# Patient Record
Sex: FEMALE | Race: WHITE | Employment: OTHER | URBAN - METROPOLITAN AREA
[De-identification: names, ages, dates, MRNs, and addresses within clinical notes are randomized per-mention and may not be internally consistent; named-entity substitution may affect disease eponyms.]

---

## 2017-10-06 PROBLEM — E87.5 HYPERKALEMIA: Status: ACTIVE | Noted: 2017-10-06
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
## 2017-10-06 PROBLEM — N17.9 ACUTE KIDNEY INJURY (HCC): Status: ACTIVE | Noted: 2017-10-06

## 2017-10-06 PROBLEM — D64.9 ANEMIA: Status: ACTIVE | Noted: 2017-10-06

## 2017-10-07 PROBLEM — E87.29 RESPIRATORY ACIDOSIS: Status: ACTIVE | Noted: 2017-10-07

## 2018-01-15 PROBLEM — I50.9 CONGESTIVE HEART FAILURE (HCC): Status: ACTIVE | Noted: 2018-01-15

## 2018-01-15 PROBLEM — N17.9 ACUTE KIDNEY INJURY (HCC): Status: RESOLVED | Noted: 2017-10-06 | Resolved: 2018-01-15

## 2018-01-15 PROBLEM — I50.33 ACUTE ON CHRONIC DIASTOLIC CONGESTIVE HEART FAILURE (HCC): Status: ACTIVE | Noted: 2018-01-15

## 2018-01-16 LAB
LEFT VENTRICULAR EJECTION FRACTION HIGH VALUE: 65 %
LEFT VENTRICULAR EJECTION FRACTION MODE: NORMAL
LV EF: 55 %

## 2018-01-19 PROBLEM — R53.81 PHYSICAL DECONDITIONING: Status: ACTIVE | Noted: 2018-01-19
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
## 2018-01-23 PROBLEM — E87.29 RESPIRATORY ACIDOSIS: Status: RESOLVED | Noted: 2017-10-07 | Resolved: 2018-01-23

## 2018-03-16 ENCOUNTER — OFFICE VISIT (OUTPATIENT)
Dept: CARDIOLOGY CLINIC | Age: 63
End: 2018-03-16
Payer: COMMERCIAL

## 2018-03-16 VITALS
RESPIRATION RATE: 18 BRPM | DIASTOLIC BLOOD PRESSURE: 58 MMHG | HEIGHT: 63 IN | HEART RATE: 83 BPM | BODY MASS INDEX: 51.21 KG/M2 | WEIGHT: 289 LBS | SYSTOLIC BLOOD PRESSURE: 110 MMHG

## 2018-03-16 DIAGNOSIS — I50.30 (HFPEF) HEART FAILURE WITH PRESERVED EJECTION FRACTION (HCC): ICD-10-CM

## 2018-03-16 DIAGNOSIS — G47.30 SLEEP-DISORDERED BREATHING: ICD-10-CM

## 2018-03-16 DIAGNOSIS — I10 ESSENTIAL HYPERTENSION: Primary | ICD-10-CM

## 2018-03-16 DIAGNOSIS — R60.0 LEG EDEMA: ICD-10-CM

## 2018-03-16 DIAGNOSIS — I48.0 PAROXYSMAL ATRIAL FIBRILLATION (HCC): ICD-10-CM

## 2018-03-16 DIAGNOSIS — I51.89 DIASTOLIC DYSFUNCTION: ICD-10-CM

## 2018-03-16 PROCEDURE — 3017F COLORECTAL CA SCREEN DOC REV: CPT | Performed by: INTERNAL MEDICINE

## 2018-03-16 PROCEDURE — 99214 OFFICE O/P EST MOD 30 MIN: CPT | Performed by: INTERNAL MEDICINE

## 2018-03-16 PROCEDURE — 3014F SCREEN MAMMO DOC REV: CPT | Performed by: INTERNAL MEDICINE

## 2018-03-16 PROCEDURE — G8482 FLU IMMUNIZE ORDER/ADMIN: HCPCS | Performed by: INTERNAL MEDICINE

## 2018-03-16 PROCEDURE — 93000 ELECTROCARDIOGRAM COMPLETE: CPT | Performed by: INTERNAL MEDICINE

## 2018-03-16 PROCEDURE — 1036F TOBACCO NON-USER: CPT | Performed by: INTERNAL MEDICINE

## 2018-03-16 PROCEDURE — G8598 ASA/ANTIPLAT THER USED: HCPCS | Performed by: INTERNAL MEDICINE

## 2018-03-16 PROCEDURE — G8427 DOCREV CUR MEDS BY ELIG CLIN: HCPCS | Performed by: INTERNAL MEDICINE

## 2018-03-16 PROCEDURE — G8417 CALC BMI ABV UP PARAM F/U: HCPCS | Performed by: INTERNAL MEDICINE

## 2018-03-16 RX ORDER — FUROSEMIDE 20 MG/1
20 TABLET ORAL DAILY
Qty: 90 TABLET | Refills: 3 | Status: ON HOLD | OUTPATIENT
Start: 2018-03-16 | End: 2018-04-16

## 2018-03-17 NOTE — PROGRESS NOTES
SUPENTA  1955  Date of Service: 3/17/2018    Patient Active Problem List    Diagnosis Date Noted    Physical deconditioning 01/19/2018    Acute on chronic diastolic congestive heart failure ([facility] 01/15/2018    Hyperkalemia 10/06/2017    PFO (patent foramen ovale) 08/12/2015    Positive hepatitis C antibody test 01/21/2015    Chronic anal fissure 06/14/2013    LVH (left ventricular hypertrophy)      Overview Note:     moderate      Iron deficiency 03/15/2013    Diabetic neuropathy ([facility] 02/22/2013    Baker's cyst of knee 02/13/2013    Steatohepatitis 11/13/2012    Chronic pain 07/31/2012    Chronic sinusitis 11/02/2011    Paroxysmal atrial fibrillation ([facility] 10/13/2011     Overview Note:     May 2004, spontaneous conversion to sinus rhythm and sinus tachycardia on Cardizem. Not on coumadin d/t prior, bleeding, GIB, & anemia.  Pulmonary hypertension 10/13/2011     Overview Note:     Mild pulmonary hypertension with a pulmonary artery systolic pressure of 78-01 mmHg on echo 5/12/04. No cardiac etiology seen.        Hyperlipidemia 06/09/2011    Osteoarthritis 06/09/2011    Hypertension 04/15/2011    Depression 02/18/2011    Type 2 diabetes mellitus with diabetic neuropathy, with long-term current use of insulin ([facility] 11/12/2010       Social History     Social History    Marital status: Single     Spouse name: N/A    Number of children: 3    Years of education: 9     Occupational History    SSD      Social History Main Topics    Smoking status: Former Smoker     Packs/day: 1.50     Years: 25.00     Types: Cigarettes     Quit date: 12/18/2004    Smokeless tobacco: Never Used    Alcohol use No    Drug use: No    Sexual activity: Not Currently     Other Topics Concern    None     Social History Narrative    None       Current Outpatient Prescriptions   Medication Sig Dispense Refill    furosemide (LASIX) 20 MG tablet Take 1 tablet by mouth daily 90 tablet 3    docusate sodium (COLACE) 100 MG capsule TAKE 1 CAPSULE BY MOUTH 3 TIMES DAILY 90 capsule 0    fluticasone (FLONASE) 50 MCG/ACT nasal spray USE 2 SPRAYS BY NASAL ROUTE DAILY 1 Bottle 5    [employer] ALCOHOL SWABS PADS USE 3 TIMES A  each 5    oxyCODONE-acetaminophen (PERCOCET)  MG per tablet Take 1 tablet by mouth every 4 hours as needed for Pain.  zolpidem (AMBIEN) 10 MG tablet Take by mouth nightly as needed for Sleep.  OXYGEN Inhale 4 L into the lungs      lansoprazole (PREVACID) 30 MG delayed release capsule TAKE 1 CAPSULE BY MOUTH DAILY 30 capsule 5    fenofibrate (TRICOR) 145 MG tablet TAKE 1 TABLET BY MOUTH DAILY 30 tablet 5    FREESTYLE LITE strip USE TO TEST BLOOD SUGAR FOUR TIMES DAILY 100 strip 5    montelukast (SINGULAIR) 10 MG tablet TAKE 1 TABLET BY MOUTH NIGHTLY 90 tablet 1    Elastic Bandages & Supports MISC 20-30 mmHg bilateral compression stockings  Size to fit  Dx:  Edema  Knee high 2 each 0    escitalopram (LEXAPRO) 20 MG tablet TAKE 1 TABLET BY MOUTH DAILY 90 tablet 1    gabapentin (NEURONTIN) 600 MG tablet TAKE 1 TABLET BY MOUTH 3 TIMES DAILY 90 tablet 5    VENTOLIN  (90 Base) MCG/ACT inhaler INHALE 2 PUFFS INTO LUNGS EVERY 4 HOURS AS NEEDED FOR WHEEZING OR SHORTNESS OF BREATH 18 Inhaler 5    loratadine (CLARITIN) 10 MG tablet TAKE 1 TABLET BY MOUTH DAILY 30 tablet 5    HUMALOG 100 UNIT/ML injection vial TAKE 90 UNITS WITH BREAKFAST. TAKE 92 UNITS WITH LUNCH. TAKE 94 UNITS WITH DINNER.  60 mL 5    metoprolol (LOPRESSOR) 100 MG tablet TAKE 1 TABLET BY MOUTH 2 TIMES DAILY 180 tablet 1    diltiazem (CARDIZEM CD) 240 MG extended release capsule TAKE 1 CAPSULE BY MOUTH 2 TIMES DAILY 180 capsule 1    nystatin (MYCOSTATIN) 148888 UNIT/GM ointment APPLY TWICE A DAY 30 g 1    spironolactone (ALDACTONE) 50 MG tablet TAKE 1 TABLET BY MOUTH DAILY 30 tablet 5    lisinopril (PRINIVIL;ZESTRIL) 20 MG tablet TAKE 1 TABLET BY MOUTH DAILY 30 tablet 5    aspirin 81 MG EC tablet TAKE 1 TABLET BY MOUTH EVERY DAY 30 tablet 5    cyclobenzaprine (FLEXERIL) 10 MG tablet TAKE 1 TABLET BY MOUTH 3 TIMES DAILY AS NEEDED (MUSCLE PAIN) 90 tablet 5    LANTUS 100 UNIT/ML injection vial INJECT 110 UNITS INTO THE SKIN 2 TIMES DAILY 60 mL 3    FREESTYLE LANCETS MISC USE AS DIRECTED 4 TIMES A  each 1    B-D INS SYR ULTRAFINE 1CC/31G 31G X 5/16\" 1 ML MISC USE AS DIRECTED 100 each 11    DULERA 200-5 MCG/ACT inhaler INHALE 2 PUFFS INTO LUNGS TWICE A DAY RINSE MOUTH AFTER USE 3 Inhaler 1    ipratropium-albuterol (DUONEB) 0.5-2.5 (3) MG/3ML SOLN nebulizer solution Inhale 3 mLs into the lungs every 4 hours. 360 mL 5    zolpidem (AMBIEN) 10 MG tablet Take 1 tablet by mouth nightly as needed for Sleep for up to 30 days. 30 tablet 0    Cholecalciferol (VITAMIN D3) 88085 units CAPS Take by mouth      potassium chloride (KLOR-CON M) 10 MEQ extended release tablet Take 1 tablet by mouth every other day 30 tablet 5    ALPRAZolam (XANAX) 0.5 MG tablet Take 1 tablet by mouth nightly as needed for Anxiety 30 tablet 0    sodium chloride (ALTAMIST SPRAY) 0.65 % nasal spray 1 spray by Nasal route as needed for Congestion Mix 1 bottle with 1/2 tube bactroban and spray in notril BID 1 Bottle 2     No current facility-administered medications for this visit. Allergies   Allergen Reactions    Statins Other (See Comments)     Muscle pains       Chief Complaint:  Morena Wellington is here today for follow up and management/recomendations for LVH, PAF, inappropriate sinus tach, HTN. History of Present Illness: Morena Wellington states that She was recently hospitalized with respiratory failure and was treated with duo nebs and IV steroids. She is not felt to be volume overloaded and was actually taken off of her diuretics. After her discharge and off of her diuretics she then started to develop lower extremity edema.  She was placed on low-dose diuretics every other day and the lower extremity edema is slowly improving. She states that her dyspnea on exertion and chronic oxygen dependence is about back to her baseline. She denies any chest discomfort, orthopnea/PND. She still has significant lower extremity edema but she states that it is improving. She denies any presyncopal symptoms. She has asthma, morbid obesity. REVIEW OF SYSTEMS:  As above. Patient does not complain of any fever, chills, nausea, vomiting or diarrhea. No focal, motor or neurological deficits. No changes in his/her vision, hearing, bowel or bladder habits. She is not known to have a history of thyroid problems. No recent nose bleeds. PHYSICAL EXAM:  Vitals:    03/16/18 1415   BP: (!) 110/58   Pulse: 83   Resp: 18   Weight: 289 lb (131.1 kg)   Height: 5' 3\" (1.6 m)       GENERAL:  She is alert and oriented x 3, communicates well, in no distress. NECK:  Thick, short, difficult to examine. No masses, trachea is mid position. Supple, full ROM, no JVD or bruits. No palpable thyromegaly or lymphadenopathy. HEART:  Regular rate and rhythm. Normal S1 and S2. There is an S4 gallop and a I/VI systolic murmur. LUNGS:  Poor air movement. No use of accessory muscles. symmetrical excursion. ABDOMEN:  Soft, non-tender. Normal bowel sounds. Morbidly obese  EXTREMITIES:  Full ROM x 4. Severe bilateral lower extremity edema. Good distal pulses. EYES:  Extraocular muscles intact. PERRL. Normal lids & conjunctiva. ENT:  Nares are clear & not bleeding. Moist mucosa. Normal lips formation. No external masses   NEURO: no tremors, full ROM x 4, EOMI. SKIN:  Warm, dry and intact. Normal turgor. EKG: Sinus rhythm, 83 bpm, nl axis, nonspecific ST - T wave changes. IRBBB      Assessment:   1. Paroxysmal atrial fibrillation. maintaining sinus rhythm at this time. Not on coumadin d/t prior GIB. 2. PFO  3. Diastolic dysfunction  4. Hypervolemia  5. Inappropriate sinus tachycardia.     6. Hypertension, well controled at this time.  7. Morbidly obese  8. Chronic lung disease. On chronic oxygen therapy. 9. Pulmonary hypertension        Recommendations:  1. She is following the cholesterol with Dr. Jonathan Yoon. 2. Increase her Lasix to every day  3. BMP  4. Referral the CHF clinic      Thank you for allowing me to participate in your patient's care.       [address], [address] [facility]  Interventional Cardiology

## 2018-04-11 RX ORDER — ZOLPIDEM TARTRATE 10 MG/1
10 TABLET ORAL NIGHTLY PRN
Qty: 30 TABLET | Refills: 0 | Status: SHIPPED | OUTPATIENT
Start: 2018-04-11 | End: 2018-05-11

## 2018-04-12 ENCOUNTER — HOSPITAL ENCOUNTER (INPATIENT)
Age: 63
LOS: 3 days | Discharge: HOME | DRG: 140 | End: 2018-04-16
Attending: EMERGENCY MEDICINE | Admitting: FAMILY MEDICINE
Payer: COMMERCIAL

## 2018-04-12 ENCOUNTER — APPOINTMENT (OUTPATIENT)
Dept: GENERAL RADIOLOGY | Age: 63
DRG: 140 | End: 2018-04-12
Payer: COMMERCIAL

## 2018-04-12 DIAGNOSIS — Z79.4 TYPE 2 DIABETES MELLITUS WITHOUT COMPLICATION, WITH LONG-TERM CURRENT USE OF INSULIN (HCC): ICD-10-CM

## 2018-04-12 DIAGNOSIS — I50.9 ACUTE CONGESTIVE HEART FAILURE, UNSPECIFIED CONGESTIVE HEART FAILURE TYPE: ICD-10-CM

## 2018-04-12 DIAGNOSIS — I51.89 DIASTOLIC DYSFUNCTION: ICD-10-CM

## 2018-04-12 DIAGNOSIS — R06.00 DYSPNEA, UNSPECIFIED TYPE: ICD-10-CM

## 2018-04-12 DIAGNOSIS — J44.1 ASTHMA EXACERBATION WITH COPD (CHRONIC OBSTRUCTIVE PULMONARY DISEASE) (HCC): Primary | ICD-10-CM

## 2018-04-12 DIAGNOSIS — I10 ESSENTIAL HYPERTENSION: ICD-10-CM

## 2018-04-12 DIAGNOSIS — R60.0 LEG EDEMA: ICD-10-CM

## 2018-04-12 DIAGNOSIS — R07.9 CHEST PAIN, UNSPECIFIED TYPE: ICD-10-CM

## 2018-04-12 DIAGNOSIS — I48.0 PAROXYSMAL ATRIAL FIBRILLATION (HCC): ICD-10-CM

## 2018-04-12 DIAGNOSIS — E11.9 TYPE 2 DIABETES MELLITUS WITHOUT COMPLICATION, WITH LONG-TERM CURRENT USE OF INSULIN (HCC): ICD-10-CM

## 2018-04-12 DIAGNOSIS — G47.30 SLEEP-DISORDERED BREATHING: ICD-10-CM

## 2018-04-12 DIAGNOSIS — I50.30 (HFPEF) HEART FAILURE WITH PRESERVED EJECTION FRACTION (HCC): ICD-10-CM

## 2018-04-12 DIAGNOSIS — J45.901 ASTHMA EXACERBATION WITH COPD (CHRONIC OBSTRUCTIVE PULMONARY DISEASE) (HCC): Primary | ICD-10-CM

## 2018-04-12 DIAGNOSIS — D64.9 ANEMIA, UNSPECIFIED TYPE: ICD-10-CM

## 2018-04-12 LAB
ANION GAP SERPL CALCULATED.3IONS-SCNC: 10 MMOL/L (ref 7–16)
BUN BLDV-MCNC: 41 MG/DL (ref 8–23)
CALCIUM SERPL-MCNC: 9.3 MG/DL (ref 8.6–10.2)
CHLORIDE BLD-SCNC: 103 MMOL/L (ref 98–107)
CO2: 29 MMOL/L (ref 22–29)
CREAT SERPL-MCNC: 0.9 MG/DL (ref 0.5–1)
GFR AFRICAN AMERICAN: >60
GFR NON-AFRICAN AMERICAN: >60 ML/MIN/1.73
GLUCOSE BLD-MCNC: 206 MG/DL (ref 74–109)
HCT VFR BLD CALC: 28.6 % (ref 34–48)
HEMOGLOBIN: 8.6 G/DL (ref 11.5–15.5)
MAGNESIUM: 1.9 MG/DL (ref 1.6–2.6)
MCH RBC QN AUTO: 25.6 PG (ref 26–35)
MCHC RBC AUTO-ENTMCNC: 30.1 % (ref 32–34.5)
MCV RBC AUTO: 85.1 FL (ref 80–99.9)
PDW BLD-RTO: 16.9 FL (ref 11.5–15)
PLATELET # BLD: 234 E9/L (ref 130–450)
PMV BLD AUTO: 9.5 FL (ref 7–12)
POTASSIUM SERPL-SCNC: 5.6 MMOL/L (ref 3.5–5)
POTASSIUM SERPL-SCNC: 6 MMOL/L (ref 3.5–5)
PRO-BNP: 186 PG/ML (ref 0–125)
RBC # BLD: 3.36 E12/L (ref 3.5–5.5)
SODIUM BLD-SCNC: 142 MMOL/L (ref 132–146)
TROPONIN: <0.01 NG/ML (ref 0–0.03)
WBC # BLD: 7.8 E9/L (ref 4.5–11.5)

## 2018-04-12 PROCEDURE — 71045 X-RAY EXAM CHEST 1 VIEW: CPT

## 2018-04-12 PROCEDURE — 96374 THER/PROPH/DIAG INJ IV PUSH: CPT

## 2018-04-12 PROCEDURE — 80048 BASIC METABOLIC PNL TOTAL CA: CPT

## 2018-04-12 PROCEDURE — 94664 DEMO&/EVAL PT USE INHALER: CPT

## 2018-04-12 PROCEDURE — 83880 ASSAY OF NATRIURETIC PEPTIDE: CPT

## 2018-04-12 PROCEDURE — 99285 EMERGENCY DEPT VISIT HI MDM: CPT

## 2018-04-12 PROCEDURE — 96375 TX/PRO/DX INJ NEW DRUG ADDON: CPT

## 2018-04-12 PROCEDURE — 84484 ASSAY OF TROPONIN QUANT: CPT

## 2018-04-12 PROCEDURE — 94640 AIRWAY INHALATION TREATMENT: CPT

## 2018-04-12 PROCEDURE — 6360000002 HC RX W HCPCS: Performed by: NURSE PRACTITIONER

## 2018-04-12 PROCEDURE — 84132 ASSAY OF SERUM POTASSIUM: CPT

## 2018-04-12 PROCEDURE — 6370000000 HC RX 637 (ALT 250 FOR IP): Performed by: NURSE PRACTITIONER

## 2018-04-12 PROCEDURE — 85027 COMPLETE CBC AUTOMATED: CPT

## 2018-04-12 PROCEDURE — 93005 ELECTROCARDIOGRAM TRACING: CPT | Performed by: PHYSICIAN ASSISTANT

## 2018-04-12 PROCEDURE — 83735 ASSAY OF MAGNESIUM: CPT

## 2018-04-12 RX ORDER — FUROSEMIDE 10 MG/ML
40 INJECTION INTRAMUSCULAR; INTRAVENOUS ONCE
Status: COMPLETED | OUTPATIENT
Start: 2018-04-12 | End: 2018-04-12

## 2018-04-12 RX ORDER — SPIRONOLACTONE 50 MG/1
1 TABLET, FILM COATED ORAL DAILY
Status: CANCELLED | OUTPATIENT
Start: 2018-04-13

## 2018-04-12 RX ORDER — IPRATROPIUM BROMIDE AND ALBUTEROL SULFATE 2.5; .5 MG/3ML; MG/3ML
1 SOLUTION RESPIRATORY (INHALATION)
Status: COMPLETED | OUTPATIENT
Start: 2018-04-12 | End: 2018-04-12

## 2018-04-12 RX ORDER — LISINOPRIL 20 MG/1
1 TABLET ORAL DAILY
Status: CANCELLED | OUTPATIENT
Start: 2018-04-13

## 2018-04-12 RX ORDER — METHYLPREDNISOLONE SODIUM SUCCINATE 125 MG/2ML
125 INJECTION, POWDER, LYOPHILIZED, FOR SOLUTION INTRAMUSCULAR; INTRAVENOUS ONCE
Status: COMPLETED | OUTPATIENT
Start: 2018-04-12 | End: 2018-04-12

## 2018-04-12 RX ORDER — ESCITALOPRAM OXALATE 20 MG/1
1 TABLET ORAL DAILY
Status: CANCELLED | OUTPATIENT
Start: 2018-04-13

## 2018-04-12 RX ADMIN — FUROSEMIDE 40 MG: 10 INJECTION, SOLUTION INTRAMUSCULAR; INTRAVENOUS at 20:33

## 2018-04-12 RX ADMIN — IPRATROPIUM BROMIDE AND ALBUTEROL SULFATE 1 AMPULE: .5; 3 SOLUTION RESPIRATORY (INHALATION) at 20:40

## 2018-04-12 RX ADMIN — IPRATROPIUM BROMIDE AND ALBUTEROL SULFATE 1 AMPULE: .5; 3 SOLUTION RESPIRATORY (INHALATION) at 20:39

## 2018-04-12 RX ADMIN — METHYLPREDNISOLONE SODIUM SUCCINATE 125 MG: 125 INJECTION, POWDER, FOR SOLUTION INTRAMUSCULAR; INTRAVENOUS at 22:12

## 2018-04-13 ENCOUNTER — APPOINTMENT (OUTPATIENT)
Dept: ULTRASOUND IMAGING | Age: 63
DRG: 140 | End: 2018-04-13
Payer: COMMERCIAL

## 2018-04-13 PROBLEM — J44.1 ASTHMA EXACERBATION WITH COPD (CHRONIC OBSTRUCTIVE PULMONARY DISEASE) (HCC): Status: ACTIVE | Noted: 2018-04-12

## 2018-04-13 PROBLEM — I89.0 LYMPHEDEMA OF BOTH LOWER EXTREMITIES: Status: ACTIVE | Noted: 2018-04-13

## 2018-04-13 PROBLEM — D64.9 LOW HEMOGLOBIN: Status: ACTIVE | Noted: 2018-04-13

## 2018-04-13 PROBLEM — I88.1 LYMPHADENITIS, CHRONIC: Status: ACTIVE | Noted: 2018-04-13

## 2018-04-13 LAB
ANION GAP SERPL CALCULATED.3IONS-SCNC: 11 MMOL/L (ref 7–16)
BUN BLDV-MCNC: 39 MG/DL (ref 8–23)
CALCIUM SERPL-MCNC: 9.5 MG/DL (ref 8.6–10.2)
CHLORIDE BLD-SCNC: 100 MMOL/L (ref 98–107)
CO2: 29 MMOL/L (ref 22–29)
CREAT SERPL-MCNC: 0.9 MG/DL (ref 0.5–1)
GFR AFRICAN AMERICAN: >60
GFR NON-AFRICAN AMERICAN: >60 ML/MIN/1.73
GLUCOSE BLD-MCNC: 249 MG/DL (ref 74–109)
HCT VFR BLD CALC: 29.4 % (ref 34–48)
HEMOGLOBIN: 8.6 G/DL (ref 11.5–15.5)
MCH RBC QN AUTO: 25.1 PG (ref 26–35)
MCHC RBC AUTO-ENTMCNC: 29.3 % (ref 32–34.5)
MCV RBC AUTO: 85.7 FL (ref 80–99.9)
METER GLUCOSE: 277 MG/DL (ref 70–110)
METER GLUCOSE: 300 MG/DL (ref 70–110)
METER GLUCOSE: 329 MG/DL (ref 70–110)
METER GLUCOSE: 337 MG/DL (ref 70–110)
PDW BLD-RTO: 16.7 FL (ref 11.5–15)
PLATELET # BLD: 225 E9/L (ref 130–450)
PMV BLD AUTO: 10 FL (ref 7–12)
POTASSIUM REFLEX MAGNESIUM: 5.3 MMOL/L (ref 3.5–5)
RBC # BLD: 3.43 E12/L (ref 3.5–5.5)
SODIUM BLD-SCNC: 140 MMOL/L (ref 132–146)
TROPONIN: <0.01 NG/ML (ref 0–0.03)
WBC # BLD: 5.9 E9/L (ref 4.5–11.5)

## 2018-04-13 PROCEDURE — 94640 AIRWAY INHALATION TREATMENT: CPT

## 2018-04-13 PROCEDURE — 36415 COLL VENOUS BLD VENIPUNCTURE: CPT

## 2018-04-13 PROCEDURE — 2580000003 HC RX 258: Performed by: FAMILY MEDICINE

## 2018-04-13 PROCEDURE — 6370000000 HC RX 637 (ALT 250 FOR IP): Performed by: FAMILY MEDICINE

## 2018-04-13 PROCEDURE — 80048 BASIC METABOLIC PNL TOTAL CA: CPT

## 2018-04-13 PROCEDURE — 94664 DEMO&/EVAL PT USE INHALER: CPT

## 2018-04-13 PROCEDURE — 2700000000 HC OXYGEN THERAPY PER DAY

## 2018-04-13 PROCEDURE — 84484 ASSAY OF TROPONIN QUANT: CPT

## 2018-04-13 PROCEDURE — 93970 EXTREMITY STUDY: CPT

## 2018-04-13 PROCEDURE — 82962 GLUCOSE BLOOD TEST: CPT

## 2018-04-13 PROCEDURE — APPSS180 APP SPLIT SHARED TIME > 60 MINUTES: Performed by: NURSE PRACTITIONER

## 2018-04-13 PROCEDURE — 6360000002 HC RX W HCPCS: Performed by: NURSE PRACTITIONER

## 2018-04-13 PROCEDURE — 97165 OT EVAL LOW COMPLEX 30 MIN: CPT

## 2018-04-13 PROCEDURE — G8987 SELF CARE CURRENT STATUS: HCPCS

## 2018-04-13 PROCEDURE — G8988 SELF CARE GOAL STATUS: HCPCS

## 2018-04-13 PROCEDURE — G8979 MOBILITY GOAL STATUS: HCPCS

## 2018-04-13 PROCEDURE — 85027 COMPLETE CBC AUTOMATED: CPT

## 2018-04-13 PROCEDURE — 2060000000 HC ICU INTERMEDIATE R&B

## 2018-04-13 PROCEDURE — 99222 1ST HOSP IP/OBS MODERATE 55: CPT | Performed by: FAMILY MEDICINE

## 2018-04-13 PROCEDURE — 99223 1ST HOSP IP/OBS HIGH 75: CPT | Performed by: INTERNAL MEDICINE

## 2018-04-13 PROCEDURE — 6360000002 HC RX W HCPCS: Performed by: FAMILY MEDICINE

## 2018-04-13 PROCEDURE — 97161 PT EVAL LOW COMPLEX 20 MIN: CPT

## 2018-04-13 PROCEDURE — G8978 MOBILITY CURRENT STATUS: HCPCS

## 2018-04-13 RX ORDER — DEXTROSE MONOHYDRATE 50 MG/ML
100 INJECTION, SOLUTION INTRAVENOUS PRN
Status: DISCONTINUED | OUTPATIENT
Start: 2018-04-13 | End: 2018-04-16 | Stop reason: HOSPADM

## 2018-04-13 RX ORDER — METOPROLOL TARTRATE 50 MG/1
100 TABLET, FILM COATED ORAL 2 TIMES DAILY
Status: DISCONTINUED | OUTPATIENT
Start: 2018-04-13 | End: 2018-04-16 | Stop reason: HOSPADM

## 2018-04-13 RX ORDER — FUROSEMIDE 10 MG/ML
40 INJECTION INTRAMUSCULAR; INTRAVENOUS 2 TIMES DAILY
Status: DISCONTINUED | OUTPATIENT
Start: 2018-04-13 | End: 2018-04-16

## 2018-04-13 RX ORDER — DILTIAZEM HYDROCHLORIDE 240 MG/1
240 CAPSULE, COATED, EXTENDED RELEASE ORAL 2 TIMES DAILY
Status: DISCONTINUED | OUTPATIENT
Start: 2018-04-13 | End: 2018-04-16 | Stop reason: HOSPADM

## 2018-04-13 RX ORDER — ACETAMINOPHEN 325 MG/1
650 TABLET ORAL EVERY 4 HOURS PRN
Status: DISCONTINUED | OUTPATIENT
Start: 2018-04-13 | End: 2018-04-16 | Stop reason: HOSPADM

## 2018-04-13 RX ORDER — ONDANSETRON 2 MG/ML
4 INJECTION INTRAMUSCULAR; INTRAVENOUS EVERY 6 HOURS PRN
Status: DISCONTINUED | OUTPATIENT
Start: 2018-04-13 | End: 2018-04-16 | Stop reason: HOSPADM

## 2018-04-13 RX ORDER — SODIUM CHLORIDE 0.9 % (FLUSH) 0.9 %
10 SYRINGE (ML) INJECTION PRN
Status: DISCONTINUED | OUTPATIENT
Start: 2018-04-13 | End: 2018-04-16 | Stop reason: HOSPADM

## 2018-04-13 RX ORDER — ESCITALOPRAM OXALATE 10 MG/1
20 TABLET ORAL DAILY
Status: DISCONTINUED | OUTPATIENT
Start: 2018-04-13 | End: 2018-04-16 | Stop reason: HOSPADM

## 2018-04-13 RX ORDER — ALBUTEROL SULFATE 90 UG/1
2 AEROSOL, METERED RESPIRATORY (INHALATION) EVERY 4 HOURS PRN
Status: DISCONTINUED | OUTPATIENT
Start: 2018-04-13 | End: 2018-04-16 | Stop reason: HOSPADM

## 2018-04-13 RX ORDER — FENOFIBRATE 160 MG/1
160 TABLET ORAL DAILY
Status: DISCONTINUED | OUTPATIENT
Start: 2018-04-13 | End: 2018-04-16 | Stop reason: HOSPADM

## 2018-04-13 RX ORDER — SODIUM CHLORIDE 0.9 % (FLUSH) 0.9 %
10 SYRINGE (ML) INJECTION EVERY 12 HOURS SCHEDULED
Status: DISCONTINUED | OUTPATIENT
Start: 2018-04-13 | End: 2018-04-16 | Stop reason: HOSPADM

## 2018-04-13 RX ORDER — IPRATROPIUM BROMIDE AND ALBUTEROL SULFATE 2.5; .5 MG/3ML; MG/3ML
1 SOLUTION RESPIRATORY (INHALATION)
Status: DISCONTINUED | OUTPATIENT
Start: 2018-04-13 | End: 2018-04-16 | Stop reason: HOSPADM

## 2018-04-13 RX ORDER — DEXTROSE MONOHYDRATE 25 G/50ML
12.5 INJECTION, SOLUTION INTRAVENOUS PRN
Status: DISCONTINUED | OUTPATIENT
Start: 2018-04-13 | End: 2018-04-16 | Stop reason: HOSPADM

## 2018-04-13 RX ORDER — NICOTINE POLACRILEX 4 MG
15 LOZENGE BUCCAL PRN
Status: DISCONTINUED | OUTPATIENT
Start: 2018-04-13 | End: 2018-04-16 | Stop reason: HOSPADM

## 2018-04-13 RX ORDER — FUROSEMIDE 40 MG/1
40 TABLET ORAL DAILY
Status: DISCONTINUED | OUTPATIENT
Start: 2018-04-13 | End: 2018-04-13

## 2018-04-13 RX ORDER — SPIRONOLACTONE 25 MG/1
50 TABLET ORAL DAILY
Status: DISCONTINUED | OUTPATIENT
Start: 2018-04-13 | End: 2018-04-16 | Stop reason: HOSPADM

## 2018-04-13 RX ORDER — PANTOPRAZOLE SODIUM 40 MG/1
40 TABLET, DELAYED RELEASE ORAL
Status: DISCONTINUED | OUTPATIENT
Start: 2018-04-13 | End: 2018-04-16 | Stop reason: HOSPADM

## 2018-04-13 RX ORDER — FUROSEMIDE 20 MG/1
20 TABLET ORAL 2 TIMES DAILY
Status: DISCONTINUED | OUTPATIENT
Start: 2018-04-13 | End: 2018-04-13

## 2018-04-13 RX ORDER — INSULIN GLARGINE 100 [IU]/ML
100 INJECTION, SOLUTION SUBCUTANEOUS NIGHTLY
Status: DISCONTINUED | OUTPATIENT
Start: 2018-04-13 | End: 2018-04-16 | Stop reason: HOSPADM

## 2018-04-13 RX ORDER — CETIRIZINE HYDROCHLORIDE 10 MG/1
5 TABLET ORAL DAILY
Status: DISCONTINUED | OUTPATIENT
Start: 2018-04-13 | End: 2018-04-16 | Stop reason: HOSPADM

## 2018-04-13 RX ORDER — HYDROCODONE BITARTRATE AND ACETAMINOPHEN 5; 325 MG/1; MG/1
1 TABLET ORAL EVERY 4 HOURS PRN
Status: DISCONTINUED | OUTPATIENT
Start: 2018-04-13 | End: 2018-04-16 | Stop reason: HOSPADM

## 2018-04-13 RX ORDER — LISINOPRIL 20 MG/1
20 TABLET ORAL DAILY
Status: DISCONTINUED | OUTPATIENT
Start: 2018-04-13 | End: 2018-04-16 | Stop reason: HOSPADM

## 2018-04-13 RX ORDER — ASPIRIN 81 MG/1
81 TABLET ORAL DAILY
Status: DISCONTINUED | OUTPATIENT
Start: 2018-04-13 | End: 2018-04-16 | Stop reason: HOSPADM

## 2018-04-13 RX ORDER — CYCLOBENZAPRINE HCL 10 MG
10 TABLET ORAL 3 TIMES DAILY PRN
Status: DISCONTINUED | OUTPATIENT
Start: 2018-04-13 | End: 2018-04-16 | Stop reason: HOSPADM

## 2018-04-13 RX ORDER — ZOLPIDEM TARTRATE 5 MG/1
10 TABLET ORAL NIGHTLY PRN
Status: DISCONTINUED | OUTPATIENT
Start: 2018-04-13 | End: 2018-04-16 | Stop reason: HOSPADM

## 2018-04-13 RX ORDER — MONTELUKAST SODIUM 10 MG/1
10 TABLET ORAL NIGHTLY
Status: DISCONTINUED | OUTPATIENT
Start: 2018-04-13 | End: 2018-04-16 | Stop reason: HOSPADM

## 2018-04-13 RX ORDER — GABAPENTIN 300 MG/1
600 CAPSULE ORAL 3 TIMES DAILY
Status: DISCONTINUED | OUTPATIENT
Start: 2018-04-13 | End: 2018-04-16 | Stop reason: HOSPADM

## 2018-04-13 RX ORDER — NYSTATIN 100000 U/G
OINTMENT TOPICAL 3 TIMES DAILY
Status: DISCONTINUED | OUTPATIENT
Start: 2018-04-13 | End: 2018-04-16 | Stop reason: HOSPADM

## 2018-04-13 RX ORDER — FUROSEMIDE 20 MG/1
20 TABLET ORAL DAILY
Status: DISCONTINUED | OUTPATIENT
Start: 2018-04-13 | End: 2018-04-13

## 2018-04-13 RX ORDER — DOCUSATE SODIUM 100 MG/1
100 CAPSULE, LIQUID FILLED ORAL 3 TIMES DAILY PRN
Status: DISCONTINUED | OUTPATIENT
Start: 2018-04-13 | End: 2018-04-16 | Stop reason: HOSPADM

## 2018-04-13 RX ADMIN — METOPROLOL TARTRATE 100 MG: 50 TABLET ORAL at 09:53

## 2018-04-13 RX ADMIN — DILTIAZEM HYDROCHLORIDE 240 MG: 240 CAPSULE, COATED, EXTENDED RELEASE ORAL at 18:37

## 2018-04-13 RX ADMIN — SPIRONOLACTONE 50 MG: 25 TABLET, FILM COATED ORAL at 09:53

## 2018-04-13 RX ADMIN — HYDROCODONE BITARTRATE AND ACETAMINOPHEN 1 TABLET: 5; 325 TABLET ORAL at 14:20

## 2018-04-13 RX ADMIN — IPRATROPIUM BROMIDE AND ALBUTEROL SULFATE 1 AMPULE: .5; 3 SOLUTION RESPIRATORY (INHALATION) at 21:50

## 2018-04-13 RX ADMIN — INSULIN LISPRO 90 UNITS: 100 INJECTION, SOLUTION INTRAVENOUS; SUBCUTANEOUS at 09:54

## 2018-04-13 RX ADMIN — DILTIAZEM HYDROCHLORIDE 240 MG: 240 CAPSULE, COATED, EXTENDED RELEASE ORAL at 09:53

## 2018-04-13 RX ADMIN — MONTELUKAST SODIUM 10 MG: 10 TABLET, FILM COATED ORAL at 20:04

## 2018-04-13 RX ADMIN — NYSTATIN: 100000 OINTMENT TOPICAL at 13:11

## 2018-04-13 RX ADMIN — INSULIN LISPRO 92 UNITS: 100 INJECTION, SOLUTION INTRAVENOUS; SUBCUTANEOUS at 12:38

## 2018-04-13 RX ADMIN — ZOLPIDEM TARTRATE 10 MG: 5 TABLET ORAL at 02:08

## 2018-04-13 RX ADMIN — FUROSEMIDE 40 MG: 10 INJECTION, SOLUTION INTRAMUSCULAR; INTRAVENOUS at 17:29

## 2018-04-13 RX ADMIN — IPRATROPIUM BROMIDE AND ALBUTEROL SULFATE 1 AMPULE: .5; 3 SOLUTION RESPIRATORY (INHALATION) at 17:10

## 2018-04-13 RX ADMIN — INSULIN GLARGINE 100 UNITS: 100 INJECTION, SOLUTION SUBCUTANEOUS at 20:04

## 2018-04-13 RX ADMIN — GABAPENTIN 600 MG: 300 CAPSULE ORAL at 13:10

## 2018-04-13 RX ADMIN — INSULIN LISPRO 4 UNITS: 100 INJECTION, SOLUTION INTRAVENOUS; SUBCUTANEOUS at 09:55

## 2018-04-13 RX ADMIN — INSULIN LISPRO 8 UNITS: 100 INJECTION, SOLUTION INTRAVENOUS; SUBCUTANEOUS at 12:41

## 2018-04-13 RX ADMIN — ESCITALOPRAM OXALATE 20 MG: 10 TABLET ORAL at 09:53

## 2018-04-13 RX ADMIN — LISINOPRIL 20 MG: 20 TABLET ORAL at 09:53

## 2018-04-13 RX ADMIN — Medication 10 ML: at 10:59

## 2018-04-13 RX ADMIN — MOMETASONE FUROATE AND FORMOTEROL FUMARATE DIHYDRATE 2 PUFF: 200; 5 AEROSOL RESPIRATORY (INHALATION) at 21:59

## 2018-04-13 RX ADMIN — IPRATROPIUM BROMIDE AND ALBUTEROL SULFATE 1 AMPULE: .5; 3 SOLUTION RESPIRATORY (INHALATION) at 09:20

## 2018-04-13 RX ADMIN — GABAPENTIN 600 MG: 300 CAPSULE ORAL at 09:53

## 2018-04-13 RX ADMIN — NYSTATIN: 100000 OINTMENT TOPICAL at 20:04

## 2018-04-13 RX ADMIN — INSULIN LISPRO 8 UNITS: 100 INJECTION, SOLUTION INTRAVENOUS; SUBCUTANEOUS at 16:28

## 2018-04-13 RX ADMIN — IPRATROPIUM BROMIDE AND ALBUTEROL SULFATE 1 AMPULE: .5; 3 SOLUTION RESPIRATORY (INHALATION) at 13:01

## 2018-04-13 RX ADMIN — Medication 10 ML: at 20:12

## 2018-04-13 RX ADMIN — FENOFIBRATE 160 MG: 160 TABLET ORAL at 09:53

## 2018-04-13 RX ADMIN — ASPIRIN 81 MG: 81 TABLET, COATED ORAL at 09:53

## 2018-04-13 RX ADMIN — HYDROCODONE BITARTRATE AND ACETAMINOPHEN 1 TABLET: 5; 325 TABLET ORAL at 09:53

## 2018-04-13 RX ADMIN — INSULIN LISPRO 94 UNITS: 100 INJECTION, SOLUTION INTRAVENOUS; SUBCUTANEOUS at 16:28

## 2018-04-13 RX ADMIN — FUROSEMIDE 40 MG: 10 INJECTION, SOLUTION INTRAMUSCULAR; INTRAVENOUS at 09:53

## 2018-04-13 RX ADMIN — ENOXAPARIN SODIUM 50 MG: 60 INJECTION SUBCUTANEOUS at 09:53

## 2018-04-13 RX ADMIN — GABAPENTIN 600 MG: 300 CAPSULE ORAL at 20:03

## 2018-04-13 RX ADMIN — ZOLPIDEM TARTRATE 10 MG: 5 TABLET ORAL at 23:34

## 2018-04-13 RX ADMIN — HYDROCODONE BITARTRATE AND ACETAMINOPHEN 1 TABLET: 5; 325 TABLET ORAL at 18:41

## 2018-04-13 RX ADMIN — MOMETASONE FUROATE AND FORMOTEROL FUMARATE DIHYDRATE 2 PUFF: 200; 5 AEROSOL RESPIRATORY (INHALATION) at 09:20

## 2018-04-13 RX ADMIN — CETIRIZINE HYDROCHLORIDE 5 MG: 10 TABLET, FILM COATED ORAL at 20:04

## 2018-04-13 RX ADMIN — METOPROLOL TARTRATE 100 MG: 50 TABLET ORAL at 18:37

## 2018-04-13 RX ADMIN — HYDROCODONE BITARTRATE AND ACETAMINOPHEN 1 TABLET: 5; 325 TABLET ORAL at 02:08

## 2018-04-13 ASSESSMENT — PAIN DESCRIPTION - ONSET: ONSET: ON-GOING

## 2018-04-13 ASSESSMENT — PAIN SCALES - GENERAL
PAINLEVEL_OUTOF10: 0
PAINLEVEL_OUTOF10: 4
PAINLEVEL_OUTOF10: 4
PAINLEVEL_OUTOF10: 5
PAINLEVEL_OUTOF10: 6
PAINLEVEL_OUTOF10: 0
PAINLEVEL_OUTOF10: 0
PAINLEVEL_OUTOF10: 5

## 2018-04-13 ASSESSMENT — PAIN DESCRIPTION - DESCRIPTORS
DESCRIPTORS: DULL;SORE;DISCOMFORT
DESCRIPTORS: ACHING;DISCOMFORT;CONSTANT

## 2018-04-13 ASSESSMENT — PAIN DESCRIPTION - PAIN TYPE
TYPE: CHRONIC PAIN

## 2018-04-13 ASSESSMENT — PAIN DESCRIPTION - ORIENTATION
ORIENTATION: RIGHT;LEFT
ORIENTATION: RIGHT

## 2018-04-13 ASSESSMENT — ENCOUNTER SYMPTOMS
COUGH: 1
ABDOMINAL PAIN: 0
VOMITING: 0
NAUSEA: 0
SHORTNESS OF BREATH: 1
HEARTBURN: 0
BLOOD IN STOOL: 0
WHEEZING: 1
SORE THROAT: 0

## 2018-04-13 ASSESSMENT — PAIN DESCRIPTION - PROGRESSION: CLINICAL_PROGRESSION: NOT CHANGED

## 2018-04-13 ASSESSMENT — PAIN DESCRIPTION - LOCATION
LOCATION: FOOT
LOCATION: FOOT
LOCATION: LEG

## 2018-04-13 ASSESSMENT — PAIN DESCRIPTION - FREQUENCY: FREQUENCY: CONTINUOUS

## 2018-04-14 LAB
ANION GAP SERPL CALCULATED.3IONS-SCNC: 9 MMOL/L (ref 7–16)
BUN BLDV-MCNC: 52 MG/DL (ref 8–23)
CALCIUM SERPL-MCNC: 9.9 MG/DL (ref 8.6–10.2)
CHLORIDE BLD-SCNC: 95 MMOL/L (ref 98–107)
CO2: 34 MMOL/L (ref 22–29)
CREAT SERPL-MCNC: 1 MG/DL (ref 0.5–1)
FERRITIN: 99 NG/ML
GFR AFRICAN AMERICAN: >60
GFR NON-AFRICAN AMERICAN: 56 ML/MIN/1.73
GLUCOSE BLD-MCNC: 284 MG/DL (ref 74–109)
HCT VFR BLD CALC: 30.3 % (ref 34–48)
HEMOGLOBIN: 9 G/DL (ref 11.5–15.5)
IMMATURE RETIC FRACT: 17.4 % (ref 3–15.9)
IRON SATURATION: 13 % (ref 15–50)
IRON: 48 MCG/DL (ref 37–145)
MCH RBC QN AUTO: 25.2 PG (ref 26–35)
MCHC RBC AUTO-ENTMCNC: 29.7 % (ref 32–34.5)
MCV RBC AUTO: 84.9 FL (ref 80–99.9)
METER GLUCOSE: 153 MG/DL (ref 70–110)
METER GLUCOSE: 196 MG/DL (ref 70–110)
METER GLUCOSE: 242 MG/DL (ref 70–110)
METER GLUCOSE: 76 MG/DL (ref 70–110)
PDW BLD-RTO: 16.8 FL (ref 11.5–15)
PLATELET # BLD: 284 E9/L (ref 130–450)
PMV BLD AUTO: 10.2 FL (ref 7–12)
POTASSIUM REFLEX MAGNESIUM: 5.1 MMOL/L (ref 3.5–5)
RBC # BLD: 3.57 E12/L (ref 3.5–5.5)
RETIC HGB EQUIVALENT: 25.3 PG (ref 28.2–36.6)
RETICULOCYTE ABSOLUTE COUNT: 0.08 E12/L
RETICULOCYTE COUNT PCT: 2.2 % (ref 0.4–1.9)
SODIUM BLD-SCNC: 138 MMOL/L (ref 132–146)
TOTAL IRON BINDING CAPACITY: 372 MCG/DL (ref 250–450)
TRANSFERRIN: 310 MG/DL (ref 200–360)
TROPONIN: <0.01 NG/ML (ref 0–0.03)
WBC # BLD: 8.6 E9/L (ref 4.5–11.5)

## 2018-04-14 PROCEDURE — 2060000000 HC ICU INTERMEDIATE R&B

## 2018-04-14 PROCEDURE — 99232 SBSQ HOSP IP/OBS MODERATE 35: CPT | Performed by: INTERNAL MEDICINE

## 2018-04-14 PROCEDURE — 82962 GLUCOSE BLOOD TEST: CPT

## 2018-04-14 PROCEDURE — 84484 ASSAY OF TROPONIN QUANT: CPT

## 2018-04-14 PROCEDURE — 85045 AUTOMATED RETICULOCYTE COUNT: CPT

## 2018-04-14 PROCEDURE — 94640 AIRWAY INHALATION TREATMENT: CPT

## 2018-04-14 PROCEDURE — 6360000002 HC RX W HCPCS: Performed by: FAMILY MEDICINE

## 2018-04-14 PROCEDURE — 6370000000 HC RX 637 (ALT 250 FOR IP): Performed by: FAMILY MEDICINE

## 2018-04-14 PROCEDURE — 36415 COLL VENOUS BLD VENIPUNCTURE: CPT

## 2018-04-14 PROCEDURE — 99232 SBSQ HOSP IP/OBS MODERATE 35: CPT | Performed by: FAMILY MEDICINE

## 2018-04-14 PROCEDURE — 80048 BASIC METABOLIC PNL TOTAL CA: CPT

## 2018-04-14 PROCEDURE — 84466 ASSAY OF TRANSFERRIN: CPT

## 2018-04-14 PROCEDURE — 85027 COMPLETE CBC AUTOMATED: CPT

## 2018-04-14 PROCEDURE — 83550 IRON BINDING TEST: CPT

## 2018-04-14 PROCEDURE — 2700000000 HC OXYGEN THERAPY PER DAY

## 2018-04-14 PROCEDURE — 82728 ASSAY OF FERRITIN: CPT

## 2018-04-14 PROCEDURE — 2580000003 HC RX 258: Performed by: FAMILY MEDICINE

## 2018-04-14 PROCEDURE — 83540 ASSAY OF IRON: CPT

## 2018-04-14 PROCEDURE — 6360000002 HC RX W HCPCS: Performed by: NURSE PRACTITIONER

## 2018-04-14 RX ADMIN — INSULIN LISPRO 3 UNITS: 100 INJECTION, SOLUTION INTRAVENOUS; SUBCUTANEOUS at 07:43

## 2018-04-14 RX ADMIN — MOMETASONE FUROATE AND FORMOTEROL FUMARATE DIHYDRATE 2 PUFF: 200; 5 AEROSOL RESPIRATORY (INHALATION) at 21:00

## 2018-04-14 RX ADMIN — INSULIN LISPRO 2 UNITS: 100 INJECTION, SOLUTION INTRAVENOUS; SUBCUTANEOUS at 20:08

## 2018-04-14 RX ADMIN — INSULIN GLARGINE 100 UNITS: 100 INJECTION, SOLUTION SUBCUTANEOUS at 20:07

## 2018-04-14 RX ADMIN — METOPROLOL TARTRATE 100 MG: 50 TABLET ORAL at 20:07

## 2018-04-14 RX ADMIN — Medication 10 ML: at 20:07

## 2018-04-14 RX ADMIN — MONTELUKAST SODIUM 10 MG: 10 TABLET, FILM COATED ORAL at 20:07

## 2018-04-14 RX ADMIN — CETIRIZINE HYDROCHLORIDE 5 MG: 10 TABLET, FILM COATED ORAL at 07:39

## 2018-04-14 RX ADMIN — NYSTATIN: 100000 OINTMENT TOPICAL at 20:15

## 2018-04-14 RX ADMIN — METOPROLOL TARTRATE 100 MG: 50 TABLET ORAL at 07:38

## 2018-04-14 RX ADMIN — DILTIAZEM HYDROCHLORIDE 240 MG: 240 CAPSULE, COATED, EXTENDED RELEASE ORAL at 20:06

## 2018-04-14 RX ADMIN — MOMETASONE FUROATE AND FORMOTEROL FUMARATE DIHYDRATE 2 PUFF: 200; 5 AEROSOL RESPIRATORY (INHALATION) at 08:50

## 2018-04-14 RX ADMIN — Medication 10 ML: at 07:40

## 2018-04-14 RX ADMIN — HYDROCODONE BITARTRATE AND ACETAMINOPHEN 1 TABLET: 5; 325 TABLET ORAL at 18:04

## 2018-04-14 RX ADMIN — ENOXAPARIN SODIUM 50 MG: 60 INJECTION SUBCUTANEOUS at 07:37

## 2018-04-14 RX ADMIN — FUROSEMIDE 40 MG: 10 INJECTION, SOLUTION INTRAMUSCULAR; INTRAVENOUS at 07:40

## 2018-04-14 RX ADMIN — NYSTATIN: 100000 OINTMENT TOPICAL at 14:32

## 2018-04-14 RX ADMIN — ACETAMINOPHEN 650 MG: 325 TABLET ORAL at 04:36

## 2018-04-14 RX ADMIN — ASPIRIN 81 MG: 81 TABLET, COATED ORAL at 07:39

## 2018-04-14 RX ADMIN — IPRATROPIUM BROMIDE AND ALBUTEROL SULFATE 1 AMPULE: .5; 3 SOLUTION RESPIRATORY (INHALATION) at 21:00

## 2018-04-14 RX ADMIN — FENOFIBRATE 160 MG: 160 TABLET ORAL at 07:39

## 2018-04-14 RX ADMIN — IPRATROPIUM BROMIDE AND ALBUTEROL SULFATE 1 AMPULE: .5; 3 SOLUTION RESPIRATORY (INHALATION) at 08:36

## 2018-04-14 RX ADMIN — GABAPENTIN 600 MG: 300 CAPSULE ORAL at 14:32

## 2018-04-14 RX ADMIN — INSULIN LISPRO 92 UNITS: 100 INJECTION, SOLUTION INTRAVENOUS; SUBCUTANEOUS at 12:07

## 2018-04-14 RX ADMIN — ESCITALOPRAM OXALATE 20 MG: 10 TABLET ORAL at 07:39

## 2018-04-14 RX ADMIN — IPRATROPIUM BROMIDE AND ALBUTEROL SULFATE 1 AMPULE: .5; 3 SOLUTION RESPIRATORY (INHALATION) at 17:14

## 2018-04-14 RX ADMIN — GABAPENTIN 600 MG: 300 CAPSULE ORAL at 07:38

## 2018-04-14 RX ADMIN — ZOLPIDEM TARTRATE 10 MG: 5 TABLET ORAL at 20:07

## 2018-04-14 RX ADMIN — GABAPENTIN 600 MG: 300 CAPSULE ORAL at 20:06

## 2018-04-14 RX ADMIN — PANTOPRAZOLE SODIUM 40 MG: 40 TABLET, DELAYED RELEASE ORAL at 06:14

## 2018-04-14 RX ADMIN — HYDROCODONE BITARTRATE AND ACETAMINOPHEN 1 TABLET: 5; 325 TABLET ORAL at 07:39

## 2018-04-14 RX ADMIN — SPIRONOLACTONE 50 MG: 25 TABLET, FILM COATED ORAL at 07:38

## 2018-04-14 RX ADMIN — HYDROCODONE BITARTRATE AND ACETAMINOPHEN 1 TABLET: 5; 325 TABLET ORAL at 13:58

## 2018-04-14 RX ADMIN — LISINOPRIL 20 MG: 20 TABLET ORAL at 07:39

## 2018-04-14 RX ADMIN — DILTIAZEM HYDROCHLORIDE 240 MG: 240 CAPSULE, COATED, EXTENDED RELEASE ORAL at 07:40

## 2018-04-14 RX ADMIN — IPRATROPIUM BROMIDE AND ALBUTEROL SULFATE 1 AMPULE: .5; 3 SOLUTION RESPIRATORY (INHALATION) at 12:53

## 2018-04-14 RX ADMIN — FUROSEMIDE 40 MG: 10 INJECTION, SOLUTION INTRAMUSCULAR; INTRAVENOUS at 17:43

## 2018-04-14 RX ADMIN — HYDROCODONE BITARTRATE AND ACETAMINOPHEN 1 TABLET: 5; 325 TABLET ORAL at 00:22

## 2018-04-14 RX ADMIN — NYSTATIN: 100000 OINTMENT TOPICAL at 07:40

## 2018-04-14 ASSESSMENT — PAIN DESCRIPTION - FREQUENCY
FREQUENCY: INTERMITTENT
FREQUENCY: CONTINUOUS
FREQUENCY: INTERMITTENT

## 2018-04-14 ASSESSMENT — PAIN DESCRIPTION - PAIN TYPE
TYPE: CHRONIC PAIN

## 2018-04-14 ASSESSMENT — PAIN DESCRIPTION - ONSET
ONSET: ON-GOING
ONSET: ON-GOING

## 2018-04-14 ASSESSMENT — PAIN DESCRIPTION - LOCATION
LOCATION: GENERALIZED
LOCATION: GENERALIZED
LOCATION: LEG
LOCATION: LEG

## 2018-04-14 ASSESSMENT — PAIN SCALES - GENERAL
PAINLEVEL_OUTOF10: 7
PAINLEVEL_OUTOF10: 9
PAINLEVEL_OUTOF10: 6
PAINLEVEL_OUTOF10: 8
PAINLEVEL_OUTOF10: 0
PAINLEVEL_OUTOF10: 7
PAINLEVEL_OUTOF10: 4

## 2018-04-14 ASSESSMENT — PAIN DESCRIPTION - DESCRIPTORS
DESCRIPTORS: ACHING;DISCOMFORT
DESCRIPTORS: ACHING;DISCOMFORT
DESCRIPTORS: ACHING;DISCOMFORT;NAGGING
DESCRIPTORS: ACHING;DISCOMFORT

## 2018-04-14 ASSESSMENT — PAIN DESCRIPTION - ORIENTATION
ORIENTATION: RIGHT;LEFT
ORIENTATION: RIGHT
ORIENTATION: RIGHT;LEFT
ORIENTATION: RIGHT;LOWER

## 2018-04-14 ASSESSMENT — PAIN DESCRIPTION - PROGRESSION
CLINICAL_PROGRESSION: NOT CHANGED
CLINICAL_PROGRESSION: NOT CHANGED

## 2018-04-15 LAB
ANION GAP SERPL CALCULATED.3IONS-SCNC: 12 MMOL/L (ref 7–16)
BUN BLDV-MCNC: 61 MG/DL (ref 8–23)
CALCIUM SERPL-MCNC: 9.4 MG/DL (ref 8.6–10.2)
CHLORIDE BLD-SCNC: 93 MMOL/L (ref 98–107)
CO2: 35 MMOL/L (ref 22–29)
CREAT SERPL-MCNC: 1.7 MG/DL (ref 0.5–1)
GFR AFRICAN AMERICAN: 37
GFR NON-AFRICAN AMERICAN: 30 ML/MIN/1.73
GLUCOSE BLD-MCNC: 153 MG/DL (ref 74–109)
HCT VFR BLD CALC: 28.2 % (ref 34–48)
HEMOGLOBIN: 8.5 G/DL (ref 11.5–15.5)
MCH RBC QN AUTO: 25.4 PG (ref 26–35)
MCHC RBC AUTO-ENTMCNC: 30.1 % (ref 32–34.5)
MCV RBC AUTO: 84.4 FL (ref 80–99.9)
METER GLUCOSE: 120 MG/DL (ref 70–110)
METER GLUCOSE: 170 MG/DL (ref 70–110)
METER GLUCOSE: 219 MG/DL (ref 70–110)
METER GLUCOSE: 292 MG/DL (ref 70–110)
PDW BLD-RTO: 17 FL (ref 11.5–15)
PLATELET # BLD: 235 E9/L (ref 130–450)
PMV BLD AUTO: 9.8 FL (ref 7–12)
POTASSIUM REFLEX MAGNESIUM: 5 MMOL/L (ref 3.5–5)
RBC # BLD: 3.34 E12/L (ref 3.5–5.5)
SODIUM BLD-SCNC: 140 MMOL/L (ref 132–146)
TROPONIN: <0.01 NG/ML (ref 0–0.03)
WBC # BLD: 7.4 E9/L (ref 4.5–11.5)

## 2018-04-15 PROCEDURE — 6370000000 HC RX 637 (ALT 250 FOR IP): Performed by: FAMILY MEDICINE

## 2018-04-15 PROCEDURE — 93005 ELECTROCARDIOGRAM TRACING: CPT | Performed by: STUDENT IN AN ORGANIZED HEALTH CARE EDUCATION/TRAINING PROGRAM

## 2018-04-15 PROCEDURE — 94640 AIRWAY INHALATION TREATMENT: CPT

## 2018-04-15 PROCEDURE — 2060000000 HC ICU INTERMEDIATE R&B

## 2018-04-15 PROCEDURE — 85027 COMPLETE CBC AUTOMATED: CPT

## 2018-04-15 PROCEDURE — 99232 SBSQ HOSP IP/OBS MODERATE 35: CPT | Performed by: STUDENT IN AN ORGANIZED HEALTH CARE EDUCATION/TRAINING PROGRAM

## 2018-04-15 PROCEDURE — 82962 GLUCOSE BLOOD TEST: CPT

## 2018-04-15 PROCEDURE — 36415 COLL VENOUS BLD VENIPUNCTURE: CPT

## 2018-04-15 PROCEDURE — 99232 SBSQ HOSP IP/OBS MODERATE 35: CPT | Performed by: INTERNAL MEDICINE

## 2018-04-15 PROCEDURE — 2580000003 HC RX 258: Performed by: FAMILY MEDICINE

## 2018-04-15 PROCEDURE — 80048 BASIC METABOLIC PNL TOTAL CA: CPT

## 2018-04-15 PROCEDURE — 6360000002 HC RX W HCPCS: Performed by: NURSE PRACTITIONER

## 2018-04-15 PROCEDURE — 84484 ASSAY OF TROPONIN QUANT: CPT

## 2018-04-15 PROCEDURE — 2700000000 HC OXYGEN THERAPY PER DAY

## 2018-04-15 PROCEDURE — 6360000002 HC RX W HCPCS: Performed by: FAMILY MEDICINE

## 2018-04-15 RX ADMIN — ACETAMINOPHEN 650 MG: 325 TABLET ORAL at 20:20

## 2018-04-15 RX ADMIN — METOPROLOL TARTRATE 100 MG: 50 TABLET ORAL at 10:23

## 2018-04-15 RX ADMIN — DILTIAZEM HYDROCHLORIDE 240 MG: 240 CAPSULE, COATED, EXTENDED RELEASE ORAL at 10:24

## 2018-04-15 RX ADMIN — IPRATROPIUM BROMIDE AND ALBUTEROL SULFATE 1 AMPULE: .5; 3 SOLUTION RESPIRATORY (INHALATION) at 12:13

## 2018-04-15 RX ADMIN — MONTELUKAST SODIUM 10 MG: 10 TABLET, FILM COATED ORAL at 20:20

## 2018-04-15 RX ADMIN — MOMETASONE FUROATE AND FORMOTEROL FUMARATE DIHYDRATE 2 PUFF: 200; 5 AEROSOL RESPIRATORY (INHALATION) at 08:46

## 2018-04-15 RX ADMIN — NYSTATIN: 100000 OINTMENT TOPICAL at 15:20

## 2018-04-15 RX ADMIN — ASPIRIN 81 MG: 81 TABLET, COATED ORAL at 10:22

## 2018-04-15 RX ADMIN — FUROSEMIDE 40 MG: 10 INJECTION, SOLUTION INTRAMUSCULAR; INTRAVENOUS at 10:24

## 2018-04-15 RX ADMIN — ENOXAPARIN SODIUM 50 MG: 60 INJECTION SUBCUTANEOUS at 10:24

## 2018-04-15 RX ADMIN — GABAPENTIN 600 MG: 300 CAPSULE ORAL at 14:38

## 2018-04-15 RX ADMIN — INSULIN LISPRO 6 UNITS: 100 INJECTION, SOLUTION INTRAVENOUS; SUBCUTANEOUS at 11:46

## 2018-04-15 RX ADMIN — ACETAMINOPHEN 650 MG: 325 TABLET ORAL at 01:00

## 2018-04-15 RX ADMIN — GABAPENTIN 600 MG: 300 CAPSULE ORAL at 10:23

## 2018-04-15 RX ADMIN — INSULIN LISPRO 94 UNITS: 100 INJECTION, SOLUTION INTRAVENOUS; SUBCUTANEOUS at 17:18

## 2018-04-15 RX ADMIN — Medication 10 ML: at 10:26

## 2018-04-15 RX ADMIN — LISINOPRIL 20 MG: 20 TABLET ORAL at 10:23

## 2018-04-15 RX ADMIN — INSULIN GLARGINE 100 UNITS: 100 INJECTION, SOLUTION SUBCUTANEOUS at 20:21

## 2018-04-15 RX ADMIN — FENOFIBRATE 160 MG: 160 TABLET ORAL at 10:22

## 2018-04-15 RX ADMIN — FUROSEMIDE 40 MG: 10 INJECTION, SOLUTION INTRAMUSCULAR; INTRAVENOUS at 17:57

## 2018-04-15 RX ADMIN — NYSTATIN: 100000 OINTMENT TOPICAL at 10:26

## 2018-04-15 RX ADMIN — IPRATROPIUM BROMIDE AND ALBUTEROL SULFATE 1 AMPULE: .5; 3 SOLUTION RESPIRATORY (INHALATION) at 08:38

## 2018-04-15 RX ADMIN — INSULIN LISPRO 2 UNITS: 100 INJECTION, SOLUTION INTRAVENOUS; SUBCUTANEOUS at 20:21

## 2018-04-15 RX ADMIN — HYDROCODONE BITARTRATE AND ACETAMINOPHEN 1 TABLET: 5; 325 TABLET ORAL at 15:18

## 2018-04-15 RX ADMIN — MOMETASONE FUROATE AND FORMOTEROL FUMARATE DIHYDRATE 2 PUFF: 200; 5 AEROSOL RESPIRATORY (INHALATION) at 20:44

## 2018-04-15 RX ADMIN — GABAPENTIN 600 MG: 300 CAPSULE ORAL at 20:20

## 2018-04-15 RX ADMIN — DILTIAZEM HYDROCHLORIDE 240 MG: 240 CAPSULE, COATED, EXTENDED RELEASE ORAL at 20:20

## 2018-04-15 RX ADMIN — CYCLOBENZAPRINE HYDROCHLORIDE 10 MG: 10 TABLET, FILM COATED ORAL at 01:00

## 2018-04-15 RX ADMIN — IPRATROPIUM BROMIDE AND ALBUTEROL SULFATE 1 AMPULE: .5; 3 SOLUTION RESPIRATORY (INHALATION) at 20:44

## 2018-04-15 RX ADMIN — SPIRONOLACTONE 50 MG: 25 TABLET, FILM COATED ORAL at 10:24

## 2018-04-15 RX ADMIN — CETIRIZINE HYDROCHLORIDE 5 MG: 10 TABLET, FILM COATED ORAL at 10:23

## 2018-04-15 RX ADMIN — HYDROCODONE BITARTRATE AND ACETAMINOPHEN 1 TABLET: 5; 325 TABLET ORAL at 23:04

## 2018-04-15 RX ADMIN — ESCITALOPRAM OXALATE 20 MG: 10 TABLET ORAL at 10:22

## 2018-04-15 RX ADMIN — METOPROLOL TARTRATE 100 MG: 50 TABLET ORAL at 20:20

## 2018-04-15 RX ADMIN — ACETAMINOPHEN 650 MG: 325 TABLET ORAL at 05:52

## 2018-04-15 RX ADMIN — PANTOPRAZOLE SODIUM 40 MG: 40 TABLET, DELAYED RELEASE ORAL at 05:52

## 2018-04-15 RX ADMIN — CYCLOBENZAPRINE HYDROCHLORIDE 10 MG: 10 TABLET, FILM COATED ORAL at 20:20

## 2018-04-15 RX ADMIN — Medication 10 ML: at 20:21

## 2018-04-15 RX ADMIN — IPRATROPIUM BROMIDE AND ALBUTEROL SULFATE 1 AMPULE: .5; 3 SOLUTION RESPIRATORY (INHALATION) at 16:54

## 2018-04-15 RX ADMIN — NYSTATIN: 100000 OINTMENT TOPICAL at 20:20

## 2018-04-15 RX ADMIN — ZOLPIDEM TARTRATE 10 MG: 5 TABLET ORAL at 20:20

## 2018-04-15 RX ADMIN — HYDROCODONE BITARTRATE AND ACETAMINOPHEN 1 TABLET: 5; 325 TABLET ORAL at 10:50

## 2018-04-15 ASSESSMENT — PAIN DESCRIPTION - ORIENTATION: ORIENTATION: RIGHT;LEFT

## 2018-04-15 ASSESSMENT — PAIN SCALES - GENERAL
PAINLEVEL_OUTOF10: 5
PAINLEVEL_OUTOF10: 4
PAINLEVEL_OUTOF10: 5
PAINLEVEL_OUTOF10: 7
PAINLEVEL_OUTOF10: 5
PAINLEVEL_OUTOF10: 10

## 2018-04-15 ASSESSMENT — PAIN DESCRIPTION - DESCRIPTORS
DESCRIPTORS: ACHING;DISCOMFORT;SORE
DESCRIPTORS: ACHING;SHARP

## 2018-04-15 ASSESSMENT — PAIN DESCRIPTION - LOCATION
LOCATION: LEG;SHOULDER
LOCATION: LEG;SHOULDER

## 2018-04-15 ASSESSMENT — PAIN DESCRIPTION - PAIN TYPE: TYPE: CHRONIC PAIN

## 2018-04-16 VITALS
TEMPERATURE: 97.5 F | DIASTOLIC BLOOD PRESSURE: 61 MMHG | WEIGHT: 290.8 LBS | HEIGHT: 63 IN | OXYGEN SATURATION: 94 % | RESPIRATION RATE: 16 BRPM | SYSTOLIC BLOOD PRESSURE: 126 MMHG | HEART RATE: 80 BPM | BODY MASS INDEX: 51.52 KG/M2

## 2018-04-16 PROBLEM — J44.1 ASTHMA EXACERBATION WITH COPD (CHRONIC OBSTRUCTIVE PULMONARY DISEASE) (HCC): Status: RESOLVED | Noted: 2018-04-12 | Resolved: 2018-04-16

## 2018-04-16 PROBLEM — J45.901 ASTHMA EXACERBATION WITH COPD (CHRONIC OBSTRUCTIVE PULMONARY DISEASE) (HCC): Status: RESOLVED | Noted: 2018-04-12 | Resolved: 2018-04-16

## 2018-04-16 LAB
ALBUMIN SERPL-MCNC: 4.4 G/DL (ref 3.5–5.2)
ALP BLD-CCNC: 46 U/L (ref 35–104)
ALT SERPL-CCNC: 13 U/L (ref 0–32)
ANION GAP SERPL CALCULATED.3IONS-SCNC: 13 MMOL/L (ref 7–16)
AST SERPL-CCNC: 15 U/L (ref 0–31)
BILIRUB SERPL-MCNC: <0.2 MG/DL (ref 0–1.2)
BILIRUBIN DIRECT: <0.2 MG/DL (ref 0–0.3)
BILIRUBIN, INDIRECT: NORMAL MG/DL (ref 0–1)
BUN BLDV-MCNC: 70 MG/DL (ref 8–23)
CALCIUM SERPL-MCNC: 9.7 MG/DL (ref 8.6–10.2)
CHLORIDE BLD-SCNC: 93 MMOL/L (ref 98–107)
CO2: 34 MMOL/L (ref 22–29)
CREAT SERPL-MCNC: 1.9 MG/DL (ref 0.5–1)
EKG ATRIAL RATE: 83 BPM
EKG P AXIS: 64 DEGREES
EKG P-R INTERVAL: 142 MS
EKG Q-T INTERVAL: 392 MS
EKG QRS DURATION: 114 MS
EKG QTC CALCULATION (BAZETT): 460 MS
EKG R AXIS: 34 DEGREES
EKG T AXIS: 42 DEGREES
EKG VENTRICULAR RATE: 83 BPM
GFR AFRICAN AMERICAN: 32
GFR NON-AFRICAN AMERICAN: 27 ML/MIN/1.73
GLUCOSE BLD-MCNC: 88 MG/DL (ref 74–109)
HCT VFR BLD CALC: 31.8 % (ref 34–48)
HEMOGLOBIN: 9.5 G/DL (ref 11.5–15.5)
MCH RBC QN AUTO: 25.2 PG (ref 26–35)
MCHC RBC AUTO-ENTMCNC: 29.9 % (ref 32–34.5)
MCV RBC AUTO: 84.4 FL (ref 80–99.9)
METER GLUCOSE: 181 MG/DL (ref 70–110)
METER GLUCOSE: 93 MG/DL (ref 70–110)
PDW BLD-RTO: 16.7 FL (ref 11.5–15)
PLATELET # BLD: 258 E9/L (ref 130–450)
PMV BLD AUTO: 10.2 FL (ref 7–12)
POTASSIUM REFLEX MAGNESIUM: 5.1 MMOL/L (ref 3.5–5)
RBC # BLD: 3.77 E12/L (ref 3.5–5.5)
SODIUM BLD-SCNC: 140 MMOL/L (ref 132–146)
TOTAL PROTEIN: 7.1 G/DL (ref 6.4–8.3)
WBC # BLD: 7.3 E9/L (ref 4.5–11.5)

## 2018-04-16 PROCEDURE — 2700000000 HC OXYGEN THERAPY PER DAY

## 2018-04-16 PROCEDURE — 6370000000 HC RX 637 (ALT 250 FOR IP): Performed by: FAMILY MEDICINE

## 2018-04-16 PROCEDURE — 2580000003 HC RX 258: Performed by: FAMILY MEDICINE

## 2018-04-16 PROCEDURE — 36415 COLL VENOUS BLD VENIPUNCTURE: CPT

## 2018-04-16 PROCEDURE — 82962 GLUCOSE BLOOD TEST: CPT

## 2018-04-16 PROCEDURE — 6360000002 HC RX W HCPCS: Performed by: FAMILY MEDICINE

## 2018-04-16 PROCEDURE — 99232 SBSQ HOSP IP/OBS MODERATE 35: CPT | Performed by: INTERNAL MEDICINE

## 2018-04-16 PROCEDURE — 93010 ELECTROCARDIOGRAM REPORT: CPT | Performed by: INTERNAL MEDICINE

## 2018-04-16 PROCEDURE — 80076 HEPATIC FUNCTION PANEL: CPT

## 2018-04-16 PROCEDURE — 6370000000 HC RX 637 (ALT 250 FOR IP): Performed by: INTERNAL MEDICINE

## 2018-04-16 PROCEDURE — 80048 BASIC METABOLIC PNL TOTAL CA: CPT

## 2018-04-16 PROCEDURE — 94640 AIRWAY INHALATION TREATMENT: CPT

## 2018-04-16 PROCEDURE — 85027 COMPLETE CBC AUTOMATED: CPT

## 2018-04-16 PROCEDURE — 99238 HOSP IP/OBS DSCHRG MGMT 30/<: CPT | Performed by: FAMILY MEDICINE

## 2018-04-16 RX ORDER — INSULIN GLARGINE 100 [IU]/ML
110 INJECTION, SOLUTION SUBCUTANEOUS NIGHTLY
Qty: 60 ML | Refills: 3 | Status: ON HOLD | OUTPATIENT
Start: 2018-04-16 | End: 2018-12-05 | Stop reason: HOSPADM

## 2018-04-16 RX ORDER — FUROSEMIDE 40 MG/1
40 TABLET ORAL DAILY
Status: DISCONTINUED | OUTPATIENT
Start: 2018-04-16 | End: 2018-04-16 | Stop reason: HOSPADM

## 2018-04-16 RX ORDER — FUROSEMIDE 40 MG/1
40 TABLET ORAL DAILY
Qty: 30 TABLET | Refills: 2 | Status: SHIPPED | OUTPATIENT
Start: 2018-04-17 | End: 2018-04-26 | Stop reason: SDUPTHER

## 2018-04-16 RX ADMIN — DOCUSATE SODIUM 100 MG: 100 CAPSULE, LIQUID FILLED ORAL at 09:25

## 2018-04-16 RX ADMIN — DILTIAZEM HYDROCHLORIDE 240 MG: 240 CAPSULE, COATED, EXTENDED RELEASE ORAL at 09:24

## 2018-04-16 RX ADMIN — METOPROLOL TARTRATE 100 MG: 50 TABLET ORAL at 09:25

## 2018-04-16 RX ADMIN — ACETAMINOPHEN 650 MG: 325 TABLET ORAL at 06:14

## 2018-04-16 RX ADMIN — LISINOPRIL 20 MG: 20 TABLET ORAL at 09:24

## 2018-04-16 RX ADMIN — ASPIRIN 81 MG: 81 TABLET, COATED ORAL at 09:24

## 2018-04-16 RX ADMIN — IPRATROPIUM BROMIDE AND ALBUTEROL SULFATE 1 AMPULE: .5; 3 SOLUTION RESPIRATORY (INHALATION) at 13:14

## 2018-04-16 RX ADMIN — HYDROCODONE BITARTRATE AND ACETAMINOPHEN 1 TABLET: 5; 325 TABLET ORAL at 03:51

## 2018-04-16 RX ADMIN — FUROSEMIDE 40 MG: 40 TABLET ORAL at 09:28

## 2018-04-16 RX ADMIN — INSULIN LISPRO 3 UNITS: 100 INJECTION, SOLUTION INTRAVENOUS; SUBCUTANEOUS at 11:56

## 2018-04-16 RX ADMIN — MOMETASONE FUROATE AND FORMOTEROL FUMARATE DIHYDRATE 2 PUFF: 200; 5 AEROSOL RESPIRATORY (INHALATION) at 09:40

## 2018-04-16 RX ADMIN — FENOFIBRATE 160 MG: 160 TABLET ORAL at 09:23

## 2018-04-16 RX ADMIN — ENOXAPARIN SODIUM 50 MG: 60 INJECTION SUBCUTANEOUS at 09:25

## 2018-04-16 RX ADMIN — ESCITALOPRAM OXALATE 20 MG: 10 TABLET ORAL at 09:24

## 2018-04-16 RX ADMIN — Medication 10 ML: at 11:20

## 2018-04-16 RX ADMIN — GABAPENTIN 600 MG: 300 CAPSULE ORAL at 09:24

## 2018-04-16 RX ADMIN — NYSTATIN: 100000 OINTMENT TOPICAL at 09:28

## 2018-04-16 RX ADMIN — HYDROCODONE BITARTRATE AND ACETAMINOPHEN 1 TABLET: 5; 325 TABLET ORAL at 09:25

## 2018-04-16 RX ADMIN — IPRATROPIUM BROMIDE AND ALBUTEROL SULFATE 1 AMPULE: .5; 3 SOLUTION RESPIRATORY (INHALATION) at 09:34

## 2018-04-16 RX ADMIN — PANTOPRAZOLE SODIUM 40 MG: 40 TABLET, DELAYED RELEASE ORAL at 06:14

## 2018-04-16 RX ADMIN — SPIRONOLACTONE 50 MG: 25 TABLET, FILM COATED ORAL at 09:23

## 2018-04-16 RX ADMIN — CETIRIZINE HYDROCHLORIDE 5 MG: 10 TABLET, FILM COATED ORAL at 09:24

## 2018-04-16 ASSESSMENT — PAIN SCALES - GENERAL
PAINLEVEL_OUTOF10: 0
PAINLEVEL_OUTOF10: 4
PAINLEVEL_OUTOF10: 6
PAINLEVEL_OUTOF10: 4
PAINLEVEL_OUTOF10: 8

## 2018-04-16 ASSESSMENT — PAIN DESCRIPTION - LOCATION: LOCATION: GENERALIZED

## 2018-04-16 ASSESSMENT — PAIN DESCRIPTION - ORIENTATION: ORIENTATION: RIGHT;LEFT

## 2018-04-16 ASSESSMENT — PAIN DESCRIPTION - DESCRIPTORS: DESCRIPTORS: ACHING;DISCOMFORT

## 2018-04-16 ASSESSMENT — PAIN DESCRIPTION - PAIN TYPE: TYPE: CHRONIC PAIN

## 2018-04-16 ASSESSMENT — PAIN DESCRIPTION - FREQUENCY: FREQUENCY: INTERMITTENT

## 2018-04-16 ASSESSMENT — PAIN DESCRIPTION - ONSET: ONSET: ON-GOING

## 2018-04-16 ASSESSMENT — PAIN DESCRIPTION - PROGRESSION: CLINICAL_PROGRESSION: NOT CHANGED

## 2018-04-17 LAB
EKG ATRIAL RATE: 93 BPM
EKG P AXIS: 77 DEGREES
EKG P-R INTERVAL: 144 MS
EKG Q-T INTERVAL: 360 MS
EKG QRS DURATION: 104 MS
EKG QTC CALCULATION (BAZETT): 447 MS
EKG R AXIS: 26 DEGREES
EKG T AXIS: 65 DEGREES
EKG VENTRICULAR RATE: 93 BPM

## 2018-04-17 ASSESSMENT — EJECTION FRACTION: EF_SOURCE: 2D ECHO

## 2018-04-20 ENCOUNTER — TELEPHONE (OUTPATIENT)
Dept: CARDIOLOGY CLINIC | Age: 63
End: 2018-04-20

## 2018-04-20 ENCOUNTER — OFFICE VISIT (OUTPATIENT)
Dept: FAMILY MEDICINE CLINIC | Age: 63
End: 2018-04-20
Payer: COMMERCIAL

## 2018-04-20 ENCOUNTER — HOSPITAL ENCOUNTER (OUTPATIENT)
Age: 63
Discharge: HOME | End: 2018-04-22
Payer: COMMERCIAL

## 2018-04-20 VITALS
HEART RATE: 78 BPM | BODY MASS INDEX: 51.91 KG/M2 | RESPIRATION RATE: 18 BRPM | WEIGHT: 293 LBS | HEIGHT: 63 IN | SYSTOLIC BLOOD PRESSURE: 110 MMHG | DIASTOLIC BLOOD PRESSURE: 60 MMHG | OXYGEN SATURATION: 94 %

## 2018-04-20 DIAGNOSIS — I27.20 PULMONARY HYPERTENSION (HCC): ICD-10-CM

## 2018-04-20 DIAGNOSIS — E11.40 TYPE 2 DIABETES MELLITUS WITH DIABETIC NEUROPATHY, WITH LONG-TERM CURRENT USE OF INSULIN (HCC): ICD-10-CM

## 2018-04-20 DIAGNOSIS — I10 ESSENTIAL HYPERTENSION: ICD-10-CM

## 2018-04-20 DIAGNOSIS — Z79.4 TYPE 2 DIABETES MELLITUS WITH DIABETIC NEUROPATHY, WITH LONG-TERM CURRENT USE OF INSULIN (HCC): ICD-10-CM

## 2018-04-20 DIAGNOSIS — Z09 HOSPITAL DISCHARGE FOLLOW-UP: Primary | ICD-10-CM

## 2018-04-20 DIAGNOSIS — R79.89 ELEVATED SERUM CREATININE: ICD-10-CM

## 2018-04-20 DIAGNOSIS — I51.7 LVH (LEFT VENTRICULAR HYPERTROPHY): ICD-10-CM

## 2018-04-20 DIAGNOSIS — Q21.12 PFO (PATENT FORAMEN OVALE): ICD-10-CM

## 2018-04-20 DIAGNOSIS — I89.0 LYMPHEDEMA OF BOTH LOWER EXTREMITIES: ICD-10-CM

## 2018-04-20 DIAGNOSIS — I48.0 PAF (PAROXYSMAL ATRIAL FIBRILLATION) (HCC): ICD-10-CM

## 2018-04-20 DIAGNOSIS — E78.5 HYPERLIPIDEMIA, UNSPECIFIED HYPERLIPIDEMIA TYPE: ICD-10-CM

## 2018-04-20 PROCEDURE — G8417 CALC BMI ABV UP PARAM F/U: HCPCS | Performed by: FAMILY MEDICINE

## 2018-04-20 PROCEDURE — 3017F COLORECTAL CA SCREEN DOC REV: CPT | Performed by: FAMILY MEDICINE

## 2018-04-20 PROCEDURE — G8427 DOCREV CUR MEDS BY ELIG CLIN: HCPCS | Performed by: FAMILY MEDICINE

## 2018-04-20 PROCEDURE — 99214 OFFICE O/P EST MOD 30 MIN: CPT | Performed by: FAMILY MEDICINE

## 2018-04-20 PROCEDURE — G8598 ASA/ANTIPLAT THER USED: HCPCS | Performed by: FAMILY MEDICINE

## 2018-04-20 PROCEDURE — 3046F HEMOGLOBIN A1C LEVEL >9.0%: CPT | Performed by: FAMILY MEDICINE

## 2018-04-20 PROCEDURE — 80048 BASIC METABOLIC PNL TOTAL CA: CPT

## 2018-04-20 PROCEDURE — 1111F DSCHRG MED/CURRENT MED MERGE: CPT | Performed by: FAMILY MEDICINE

## 2018-04-20 PROCEDURE — 1036F TOBACCO NON-USER: CPT | Performed by: FAMILY MEDICINE

## 2018-04-20 PROCEDURE — 2022F DILAT RTA XM EVC RTNOPTHY: CPT | Performed by: FAMILY MEDICINE

## 2018-04-20 RX ORDER — FEXOFENADINE HCL 180 MG/1
180 TABLET ORAL DAILY
Qty: 30 TABLET | Refills: 2 | Status: SHIPPED | OUTPATIENT
Start: 2018-04-20 | End: 2018-07-13 | Stop reason: SDUPTHER

## 2018-04-21 LAB
ANION GAP SERPL CALCULATED.3IONS-SCNC: 14 MMOL/L (ref 7–16)
BUN BLDV-MCNC: 73 MG/DL (ref 8–23)
CALCIUM SERPL-MCNC: 10.5 MG/DL (ref 8.6–10.2)
CHLORIDE BLD-SCNC: 100 MMOL/L (ref 98–107)
CO2: 32 MMOL/L (ref 22–29)
CREAT SERPL-MCNC: 1.7 MG/DL (ref 0.5–1)
GFR AFRICAN AMERICAN: 37
GFR NON-AFRICAN AMERICAN: 30 ML/MIN/1.73
GLUCOSE BLD-MCNC: 73 MG/DL (ref 74–109)
POTASSIUM SERPL-SCNC: 5.8 MMOL/L (ref 3.5–5)
SODIUM BLD-SCNC: 146 MMOL/L (ref 132–146)

## 2018-04-23 ENCOUNTER — TELEPHONE (OUTPATIENT)
Dept: FAMILY MEDICINE CLINIC | Age: 63
End: 2018-04-23

## 2018-04-23 ENCOUNTER — TELEPHONE (OUTPATIENT)
Dept: CARDIOLOGY CLINIC | Age: 63
End: 2018-04-23

## 2018-04-23 DIAGNOSIS — R79.89 ELEVATED SERUM CREATININE: Primary | ICD-10-CM

## 2018-04-24 ENCOUNTER — TELEPHONE (OUTPATIENT)
Dept: FAMILY MEDICINE CLINIC | Age: 63
End: 2018-04-24

## 2018-04-24 DIAGNOSIS — I89.0 LYMPHEDEMA OF BOTH LOWER EXTREMITIES: Primary | ICD-10-CM

## 2018-04-25 ENCOUNTER — TELEPHONE (OUTPATIENT)
Dept: CARDIOLOGY CLINIC | Age: 63
End: 2018-04-25

## 2018-04-25 ENCOUNTER — HOSPITAL ENCOUNTER (OUTPATIENT)
Age: 63
Discharge: HOME | End: 2018-04-25
Payer: COMMERCIAL

## 2018-04-25 ENCOUNTER — HOSPITAL ENCOUNTER (OUTPATIENT)
Dept: OTHER | Age: 63
Setting detail: THERAPIES SERIES
Discharge: HOME | End: 2018-04-25
Payer: COMMERCIAL

## 2018-04-25 VITALS
SYSTOLIC BLOOD PRESSURE: 105 MMHG | RESPIRATION RATE: 18 BRPM | DIASTOLIC BLOOD PRESSURE: 52 MMHG | HEART RATE: 81 BPM | WEIGHT: 293 LBS | BODY MASS INDEX: 52.19 KG/M2

## 2018-04-25 DIAGNOSIS — E87.5 HIGH POTASSIUM: Primary | ICD-10-CM

## 2018-04-25 LAB
ANION GAP SERPL CALCULATED.3IONS-SCNC: 10 MMOL/L (ref 7–16)
ANION GAP SERPL CALCULATED.3IONS-SCNC: 15 MMOL/L (ref 7–16)
BUN BLDV-MCNC: 69 MG/DL (ref 8–23)
BUN BLDV-MCNC: 71 MG/DL (ref 8–23)
CALCIUM SERPL-MCNC: 10.4 MG/DL (ref 8.6–10.2)
CALCIUM SERPL-MCNC: 9.9 MG/DL (ref 8.6–10.2)
CHLORIDE BLD-SCNC: 98 MMOL/L (ref 98–107)
CHLORIDE BLD-SCNC: 98 MMOL/L (ref 98–107)
CO2: 28 MMOL/L (ref 22–29)
CO2: 33 MMOL/L (ref 22–29)
CREAT SERPL-MCNC: 1.7 MG/DL (ref 0.5–1)
CREAT SERPL-MCNC: 1.8 MG/DL (ref 0.5–1)
GFR AFRICAN AMERICAN: 34
GFR AFRICAN AMERICAN: 37
GFR NON-AFRICAN AMERICAN: 28 ML/MIN/1.73
GFR NON-AFRICAN AMERICAN: 30 ML/MIN/1.73
GLUCOSE BLD-MCNC: 151 MG/DL (ref 74–109)
GLUCOSE BLD-MCNC: 159 MG/DL (ref 74–109)
POTASSIUM SERPL-SCNC: 5.9 MMOL/L (ref 3.5–5)
POTASSIUM SERPL-SCNC: 6.4 MMOL/L (ref 3.5–5)
PRO-BNP: 227 PG/ML (ref 0–125)
SODIUM BLD-SCNC: 141 MMOL/L (ref 132–146)
SODIUM BLD-SCNC: 141 MMOL/L (ref 132–146)

## 2018-04-25 PROCEDURE — 80048 BASIC METABOLIC PNL TOTAL CA: CPT

## 2018-04-25 PROCEDURE — 36415 COLL VENOUS BLD VENIPUNCTURE: CPT

## 2018-04-25 PROCEDURE — 83880 ASSAY OF NATRIURETIC PEPTIDE: CPT

## 2018-04-25 PROCEDURE — 99214 OFFICE O/P EST MOD 30 MIN: CPT

## 2018-04-26 ENCOUNTER — TELEPHONE (OUTPATIENT)
Dept: CARDIOLOGY CLINIC | Age: 63
End: 2018-04-26

## 2018-04-26 ENCOUNTER — OFFICE VISIT (OUTPATIENT)
Dept: CARDIOLOGY CLINIC | Age: 63
End: 2018-04-26
Payer: COMMERCIAL

## 2018-04-26 VITALS
RESPIRATION RATE: 16 BRPM | OXYGEN SATURATION: 100 % | HEART RATE: 80 BPM | WEIGHT: 292 LBS | SYSTOLIC BLOOD PRESSURE: 130 MMHG | HEIGHT: 63 IN | DIASTOLIC BLOOD PRESSURE: 60 MMHG | BODY MASS INDEX: 51.74 KG/M2

## 2018-04-26 DIAGNOSIS — I51.89 DIASTOLIC DYSFUNCTION: ICD-10-CM

## 2018-04-26 DIAGNOSIS — I48.0 PAROXYSMAL ATRIAL FIBRILLATION (HCC): ICD-10-CM

## 2018-04-26 DIAGNOSIS — G47.30 SLEEP-DISORDERED BREATHING: ICD-10-CM

## 2018-04-26 DIAGNOSIS — I50.33 ACUTE ON CHRONIC HEART FAILURE WITH NORMAL EJECTION FRACTION (HCC): Primary | ICD-10-CM

## 2018-04-26 DIAGNOSIS — R60.0 LEG EDEMA: ICD-10-CM

## 2018-04-26 DIAGNOSIS — E87.5 HYPERKALEMIA: ICD-10-CM

## 2018-04-26 DIAGNOSIS — I10 ESSENTIAL HYPERTENSION: ICD-10-CM

## 2018-04-26 DIAGNOSIS — I48.0 PAF (PAROXYSMAL ATRIAL FIBRILLATION) (HCC): ICD-10-CM

## 2018-04-26 DIAGNOSIS — E87.5 HIGH POTASSIUM: Primary | ICD-10-CM

## 2018-04-26 DIAGNOSIS — E61.1 IRON DEFICIENCY: ICD-10-CM

## 2018-04-26 PROCEDURE — 93000 ELECTROCARDIOGRAM COMPLETE: CPT | Performed by: INTERNAL MEDICINE

## 2018-04-26 PROCEDURE — 99214 OFFICE O/P EST MOD 30 MIN: CPT | Performed by: NURSE PRACTITIONER

## 2018-04-26 RX ORDER — FERROUS SULFATE 325(65) MG
325 TABLET ORAL
Qty: 30 TABLET | Refills: 3 | Status: SHIPPED | OUTPATIENT
Start: 2018-04-26 | End: 2019-09-20 | Stop reason: SDUPTHER

## 2018-04-26 RX ORDER — FUROSEMIDE 40 MG/1
40 TABLET ORAL 2 TIMES DAILY
Qty: 30 TABLET | Refills: 2 | Status: ON HOLD | OUTPATIENT
Start: 2018-04-26 | End: 2018-06-03 | Stop reason: HOSPADM

## 2018-04-26 RX ORDER — SPIRONOLACTONE 50 MG/1
25 TABLET, FILM COATED ORAL DAILY
Qty: 30 TABLET | Refills: 5 | Status: SHIPPED | OUTPATIENT
Start: 2018-04-26 | End: 2018-05-17 | Stop reason: SINTOL

## 2018-04-27 ENCOUNTER — TELEPHONE (OUTPATIENT)
Dept: CARDIOLOGY CLINIC | Age: 63
End: 2018-04-27

## 2018-04-30 ENCOUNTER — TELEPHONE (OUTPATIENT)
Dept: CARDIOLOGY CLINIC | Age: 63
End: 2018-04-30

## 2018-05-01 DIAGNOSIS — I10 ESSENTIAL HYPERTENSION: ICD-10-CM

## 2018-05-01 DIAGNOSIS — G89.4 CHRONIC PAIN SYNDROME: ICD-10-CM

## 2018-05-01 RX ORDER — DILTIAZEM HYDROCHLORIDE 240 MG/1
CAPSULE, COATED, EXTENDED RELEASE ORAL
Qty: 180 CAPSULE | Refills: 1 | Status: SHIPPED | OUTPATIENT
Start: 2018-05-01 | End: 2018-11-21 | Stop reason: SDUPTHER

## 2018-05-01 RX ORDER — CYCLOBENZAPRINE HCL 10 MG
TABLET ORAL
Qty: 90 TABLET | Refills: 5 | Status: SHIPPED | OUTPATIENT
Start: 2018-05-01 | End: 2018-11-13 | Stop reason: SDUPTHER

## 2018-05-02 ENCOUNTER — TELEPHONE (OUTPATIENT)
Dept: ADMINISTRATIVE | Age: 63
End: 2018-05-02

## 2018-05-02 ENCOUNTER — TELEPHONE (OUTPATIENT)
Dept: CARDIOLOGY CLINIC | Age: 63
End: 2018-05-02

## 2018-05-03 ENCOUNTER — HOSPITAL ENCOUNTER (OUTPATIENT)
Age: 63
Discharge: HOME | End: 2018-05-03
Payer: COMMERCIAL

## 2018-05-03 ENCOUNTER — TELEPHONE (OUTPATIENT)
Dept: CARDIOLOGY CLINIC | Age: 63
End: 2018-05-03

## 2018-05-03 DIAGNOSIS — E87.5 HIGH POTASSIUM: Primary | ICD-10-CM

## 2018-05-03 DIAGNOSIS — E87.5 HIGH POTASSIUM: ICD-10-CM

## 2018-05-03 LAB
ANION GAP SERPL CALCULATED.3IONS-SCNC: 14 MMOL/L (ref 7–16)
BUN BLDV-MCNC: 72 MG/DL (ref 8–23)
CALCIUM SERPL-MCNC: 10.8 MG/DL (ref 8.6–10.2)
CHLORIDE BLD-SCNC: 100 MMOL/L (ref 98–107)
CO2: 28 MMOL/L (ref 22–29)
CREAT SERPL-MCNC: 1.7 MG/DL (ref 0.5–1)
GFR AFRICAN AMERICAN: 37
GFR NON-AFRICAN AMERICAN: 30 ML/MIN/1.73
GLUCOSE BLD-MCNC: 136 MG/DL (ref 74–109)
POTASSIUM SERPL-SCNC: 5.5 MMOL/L (ref 3.5–5)
SODIUM BLD-SCNC: 142 MMOL/L (ref 132–146)

## 2018-05-03 PROCEDURE — 36415 COLL VENOUS BLD VENIPUNCTURE: CPT

## 2018-05-03 PROCEDURE — 80048 BASIC METABOLIC PNL TOTAL CA: CPT

## 2018-05-10 ENCOUNTER — TELEPHONE (OUTPATIENT)
Dept: CARDIOLOGY CLINIC | Age: 63
End: 2018-05-10

## 2018-05-10 DIAGNOSIS — E87.5 HIGH POTASSIUM: Primary | ICD-10-CM

## 2018-05-16 ENCOUNTER — OFFICE VISIT (OUTPATIENT)
Dept: FAMILY MEDICINE CLINIC | Age: 63
End: 2018-05-16
Payer: COMMERCIAL

## 2018-05-16 ENCOUNTER — HOSPITAL ENCOUNTER (OUTPATIENT)
Age: 63
Discharge: HOME | End: 2018-05-18
Payer: COMMERCIAL

## 2018-05-16 VITALS
OXYGEN SATURATION: 91 % | HEIGHT: 63 IN | WEIGHT: 292 LBS | BODY MASS INDEX: 51.74 KG/M2 | HEART RATE: 87 BPM | RESPIRATION RATE: 22 BRPM | DIASTOLIC BLOOD PRESSURE: 84 MMHG | SYSTOLIC BLOOD PRESSURE: 134 MMHG | TEMPERATURE: 97.9 F

## 2018-05-16 DIAGNOSIS — R30.0 DYSURIA: ICD-10-CM

## 2018-05-16 DIAGNOSIS — E78.5 HYPERLIPIDEMIA, UNSPECIFIED HYPERLIPIDEMIA TYPE: ICD-10-CM

## 2018-05-16 DIAGNOSIS — E11.40 TYPE 2 DIABETES MELLITUS WITH DIABETIC NEUROPATHY, WITH LONG-TERM CURRENT USE OF INSULIN (HCC): Primary | ICD-10-CM

## 2018-05-16 DIAGNOSIS — F32.A DEPRESSION, UNSPECIFIED DEPRESSION TYPE: ICD-10-CM

## 2018-05-16 DIAGNOSIS — Z79.4 TYPE 2 DIABETES MELLITUS WITH DIABETIC NEUROPATHY, WITH LONG-TERM CURRENT USE OF INSULIN (HCC): Primary | ICD-10-CM

## 2018-05-16 DIAGNOSIS — Z76.0 MEDICATION REFILL: ICD-10-CM

## 2018-05-16 DIAGNOSIS — Z13.220 SCREENING FOR HYPERLIPIDEMIA: ICD-10-CM

## 2018-05-16 DIAGNOSIS — G89.4 CHRONIC PAIN SYNDROME: ICD-10-CM

## 2018-05-16 DIAGNOSIS — I89.0 LYMPHEDEMA OF BOTH LOWER EXTREMITIES: ICD-10-CM

## 2018-05-16 DIAGNOSIS — R53.81 PHYSICAL DECONDITIONING: ICD-10-CM

## 2018-05-16 DIAGNOSIS — I10 ESSENTIAL HYPERTENSION: ICD-10-CM

## 2018-05-16 LAB
ANION GAP SERPL CALCULATED.3IONS-SCNC: 16 MMOL/L (ref 7–16)
BILIRUBIN, POC: NORMAL
BLOOD URINE, POC: NORMAL
BUN BLDV-MCNC: 74 MG/DL (ref 8–23)
CALCIUM SERPL-MCNC: 9.4 MG/DL (ref 8.6–10.2)
CHLORIDE BLD-SCNC: 99 MMOL/L (ref 98–107)
CHOLESTEROL, FASTING: 192 MG/DL (ref 0–199)
CLARITY, POC: CLEAR
CO2: 24 MMOL/L (ref 22–29)
COLOR, POC: NORMAL
CREAT SERPL-MCNC: 1.8 MG/DL (ref 0.5–1)
CREATININE URINE POCT: 100
GFR AFRICAN AMERICAN: 34
GFR NON-AFRICAN AMERICAN: 28 ML/MIN/1.73
GLUCOSE BLD-MCNC: 176 MG/DL (ref 74–109)
GLUCOSE URINE, POC: NORMAL
HBA1C MFR BLD: 7.2 %
HDLC SERPL-MCNC: 34 MG/DL
KETONES, POC: NORMAL
LDL CHOLESTEROL CALCULATED: 98 MG/DL (ref 0–99)
LEUKOCYTE EST, POC: NORMAL
MICROALBUMIN/CREAT 24H UR: 10 MG/G{CREAT}
MICROALBUMIN/CREAT UR-RTO: <30
NITRITE, POC: POSITIVE
PH, POC: 5.5
POTASSIUM SERPL-SCNC: 5.9 MMOL/L (ref 3.5–5)
PROTEIN, POC: NORMAL
SODIUM BLD-SCNC: 139 MMOL/L (ref 132–146)
SPECIFIC GRAVITY, POC: 1.02
TRIGLYCERIDE, FASTING: 301 MG/DL (ref 0–149)
UROBILINOGEN, POC: 0.2
VLDLC SERPL CALC-MCNC: 60 MG/DL

## 2018-05-16 PROCEDURE — 81002 URINALYSIS NONAUTO W/O SCOPE: CPT | Performed by: FAMILY MEDICINE

## 2018-05-16 PROCEDURE — 3045F PR MOST RECENT HEMOGLOBIN A1C LEVEL 7.0-9.0%: CPT | Performed by: FAMILY MEDICINE

## 2018-05-16 PROCEDURE — 1111F DSCHRG MED/CURRENT MED MERGE: CPT | Performed by: FAMILY MEDICINE

## 2018-05-16 PROCEDURE — 2022F DILAT RTA XM EVC RTNOPTHY: CPT | Performed by: FAMILY MEDICINE

## 2018-05-16 PROCEDURE — 80061 LIPID PANEL: CPT

## 2018-05-16 PROCEDURE — 87088 URINE BACTERIA CULTURE: CPT

## 2018-05-16 PROCEDURE — G8598 ASA/ANTIPLAT THER USED: HCPCS | Performed by: FAMILY MEDICINE

## 2018-05-16 PROCEDURE — 99214 OFFICE O/P EST MOD 30 MIN: CPT | Performed by: FAMILY MEDICINE

## 2018-05-16 PROCEDURE — 1036F TOBACCO NON-USER: CPT | Performed by: FAMILY MEDICINE

## 2018-05-16 PROCEDURE — 82044 UR ALBUMIN SEMIQUANTITATIVE: CPT | Performed by: FAMILY MEDICINE

## 2018-05-16 PROCEDURE — 3017F COLORECTAL CA SCREEN DOC REV: CPT | Performed by: FAMILY MEDICINE

## 2018-05-16 PROCEDURE — G8417 CALC BMI ABV UP PARAM F/U: HCPCS | Performed by: FAMILY MEDICINE

## 2018-05-16 PROCEDURE — 83036 HEMOGLOBIN GLYCOSYLATED A1C: CPT | Performed by: FAMILY MEDICINE

## 2018-05-16 PROCEDURE — 80048 BASIC METABOLIC PNL TOTAL CA: CPT

## 2018-05-16 PROCEDURE — G8427 DOCREV CUR MEDS BY ELIG CLIN: HCPCS | Performed by: FAMILY MEDICINE

## 2018-05-16 RX ORDER — GABAPENTIN 800 MG/1
600 TABLET ORAL 3 TIMES DAILY
Qty: 90 TABLET | Refills: 2 | Status: ON HOLD | OUTPATIENT
Start: 2018-05-16 | End: 2018-06-03

## 2018-05-16 RX ORDER — ALPRAZOLAM 0.5 MG/1
0.5 TABLET ORAL NIGHTLY PRN
Qty: 30 TABLET | Refills: 0 | Status: SHIPPED | OUTPATIENT
Start: 2018-05-16 | End: 2018-06-15

## 2018-05-17 DIAGNOSIS — E87.5 HIGH POTASSIUM: Primary | ICD-10-CM

## 2018-05-17 RX ORDER — SULFAMETHOXAZOLE AND TRIMETHOPRIM 800; 160 MG/1; MG/1
1 TABLET ORAL 2 TIMES DAILY
Qty: 10 TABLET | Refills: 0 | Status: SHIPPED | OUTPATIENT
Start: 2018-05-17 | End: 2018-05-22

## 2018-05-17 ASSESSMENT — ENCOUNTER SYMPTOMS
CHEST TIGHTNESS: 0
CONSTIPATION: 0
DIARRHEA: 0
SHORTNESS OF BREATH: 0
SINUS PRESSURE: 0
TROUBLE SWALLOWING: 0
NAUSEA: 0
SINUS PAIN: 0
VOMITING: 0
COUGH: 0
BLOOD IN STOOL: 0
ABDOMINAL PAIN: 0

## 2018-05-19 LAB — URINE CULTURE, ROUTINE: NORMAL

## 2018-05-26 ENCOUNTER — APPOINTMENT (OUTPATIENT)
Dept: GENERAL RADIOLOGY | Age: 63
DRG: 469 | End: 2018-05-26
Payer: COMMERCIAL

## 2018-05-26 ENCOUNTER — HOSPITAL ENCOUNTER (INPATIENT)
Age: 63
LOS: 8 days | Discharge: HOME HEALTH | DRG: 469 | End: 2018-06-03
Attending: EMERGENCY MEDICINE | Admitting: INTERNAL MEDICINE
Payer: COMMERCIAL

## 2018-05-26 DIAGNOSIS — E87.5 HYPERKALEMIA: ICD-10-CM

## 2018-05-26 DIAGNOSIS — N17.9 AKI (ACUTE KIDNEY INJURY) (HCC): ICD-10-CM

## 2018-05-26 DIAGNOSIS — J45.901 ASTHMA EXACERBATION: ICD-10-CM

## 2018-05-26 DIAGNOSIS — R06.09 DYSPNEA ON EXERTION: Primary | ICD-10-CM

## 2018-05-26 DIAGNOSIS — G89.4 CHRONIC PAIN SYNDROME: ICD-10-CM

## 2018-05-26 LAB
ANION GAP SERPL CALCULATED.3IONS-SCNC: 11 MMOL/L (ref 7–16)
BASOPHILS ABSOLUTE: 0.03 E9/L (ref 0–0.2)
BASOPHILS RELATIVE PERCENT: 0.5 % (ref 0–2)
BUN BLDV-MCNC: 97 MG/DL (ref 8–23)
CALCIUM SERPL-MCNC: 9.1 MG/DL (ref 8.6–10.2)
CHLORIDE BLD-SCNC: 97 MMOL/L (ref 98–107)
CO2: 25 MMOL/L (ref 22–29)
CREAT SERPL-MCNC: 2.3 MG/DL (ref 0.5–1)
CREATININE URINE: 11 MG/DL (ref 29–226)
EOSINOPHILS ABSOLUTE: 0.11 E9/L (ref 0.05–0.5)
EOSINOPHILS RELATIVE PERCENT: 1.7 % (ref 0–6)
GFR AFRICAN AMERICAN: 26
GFR NON-AFRICAN AMERICAN: 21 ML/MIN/1.73
GLUCOSE BLD-MCNC: 228 MG/DL (ref 74–109)
HCT VFR BLD CALC: 24.6 % (ref 34–48)
HEMOGLOBIN: 7.3 G/DL (ref 11.5–15.5)
IMMATURE GRANULOCYTES #: 0.03 E9/L
IMMATURE GRANULOCYTES %: 0.5 % (ref 0–5)
LYMPHOCYTES ABSOLUTE: 1.48 E9/L (ref 1.5–4)
LYMPHOCYTES RELATIVE PERCENT: 23.2 % (ref 20–42)
MCH RBC QN AUTO: 26.5 PG (ref 26–35)
MCHC RBC AUTO-ENTMCNC: 29.7 % (ref 32–34.5)
MCV RBC AUTO: 89.5 FL (ref 80–99.9)
METER GLUCOSE: 293 MG/DL (ref 70–110)
METER GLUCOSE: 374 MG/DL (ref 70–110)
METER GLUCOSE: 380 MG/DL (ref 70–110)
MONOCYTES ABSOLUTE: 0.41 E9/L (ref 0.1–0.95)
MONOCYTES RELATIVE PERCENT: 6.4 % (ref 2–12)
NEUTROPHILS ABSOLUTE: 4.31 E9/L (ref 1.8–7.3)
NEUTROPHILS RELATIVE PERCENT: 67.7 % (ref 43–80)
PDW BLD-RTO: 15.6 FL (ref 11.5–15)
PLATELET # BLD: 207 E9/L (ref 130–450)
PMV BLD AUTO: 10.3 FL (ref 7–12)
POTASSIUM SERPL-SCNC: 5.8 MMOL/L (ref 3.5–5)
POTASSIUM SERPL-SCNC: 6.9 MMOL/L (ref 3.5–5)
POTASSIUM SERPL-SCNC: 7 MMOL/L (ref 3.5–5)
POTASSIUM SERPL-SCNC: 8 MMOL/L (ref 3.5–5)
POTASSIUM, UR: 20.1 MMOL/L
PRO-BNP: 808 PG/ML (ref 0–125)
RBC # BLD: 2.75 E12/L (ref 3.5–5.5)
SODIUM BLD-SCNC: 133 MMOL/L (ref 132–146)
SODIUM URINE: 107 MMOL/L
WBC # BLD: 6.4 E9/L (ref 4.5–11.5)

## 2018-05-26 PROCEDURE — 2580000003 HC RX 258: Performed by: EMERGENCY MEDICINE

## 2018-05-26 PROCEDURE — 99285 EMERGENCY DEPT VISIT HI MDM: CPT

## 2018-05-26 PROCEDURE — 71045 X-RAY EXAM CHEST 1 VIEW: CPT

## 2018-05-26 PROCEDURE — 6370000000 HC RX 637 (ALT 250 FOR IP): Performed by: NURSE PRACTITIONER

## 2018-05-26 PROCEDURE — 85025 COMPLETE CBC W/AUTO DIFF WBC: CPT

## 2018-05-26 PROCEDURE — 80048 BASIC METABOLIC PNL TOTAL CA: CPT

## 2018-05-26 PROCEDURE — 36415 COLL VENOUS BLD VENIPUNCTURE: CPT

## 2018-05-26 PROCEDURE — 84132 ASSAY OF SERUM POTASSIUM: CPT

## 2018-05-26 PROCEDURE — 82962 GLUCOSE BLOOD TEST: CPT

## 2018-05-26 PROCEDURE — 84133 ASSAY OF URINE POTASSIUM: CPT

## 2018-05-26 PROCEDURE — 96375 TX/PRO/DX INJ NEW DRUG ADDON: CPT

## 2018-05-26 PROCEDURE — 36430 TRANSFUSION BLD/BLD COMPNT: CPT

## 2018-05-26 PROCEDURE — 94664 DEMO&/EVAL PT USE INHALER: CPT

## 2018-05-26 PROCEDURE — 6360000002 HC RX W HCPCS: Performed by: EMERGENCY MEDICINE

## 2018-05-26 PROCEDURE — 6370000000 HC RX 637 (ALT 250 FOR IP): Performed by: EMERGENCY MEDICINE

## 2018-05-26 PROCEDURE — 84300 ASSAY OF URINE SODIUM: CPT

## 2018-05-26 PROCEDURE — 82570 ASSAY OF URINE CREATININE: CPT

## 2018-05-26 PROCEDURE — 2060000000 HC ICU INTERMEDIATE R&B

## 2018-05-26 PROCEDURE — 96374 THER/PROPH/DIAG INJ IV PUSH: CPT

## 2018-05-26 PROCEDURE — 83880 ASSAY OF NATRIURETIC PEPTIDE: CPT

## 2018-05-26 PROCEDURE — 94640 AIRWAY INHALATION TREATMENT: CPT

## 2018-05-26 PROCEDURE — 94761 N-INVAS EAR/PLS OXIMETRY MLT: CPT

## 2018-05-26 RX ORDER — ESCITALOPRAM OXALATE 10 MG/1
20 TABLET ORAL DAILY
Status: DISCONTINUED | OUTPATIENT
Start: 2018-05-26 | End: 2018-06-03 | Stop reason: HOSPADM

## 2018-05-26 RX ORDER — FUROSEMIDE 10 MG/ML
40 INJECTION INTRAMUSCULAR; INTRAVENOUS ONCE
Status: COMPLETED | OUTPATIENT
Start: 2018-05-26 | End: 2018-05-26

## 2018-05-26 RX ORDER — NICOTINE POLACRILEX 4 MG
15 LOZENGE BUCCAL PRN
Status: DISCONTINUED | OUTPATIENT
Start: 2018-05-26 | End: 2018-06-03 | Stop reason: HOSPADM

## 2018-05-26 RX ORDER — INSULIN GLARGINE 100 [IU]/ML
110 INJECTION, SOLUTION SUBCUTANEOUS NIGHTLY
Status: DISCONTINUED | OUTPATIENT
Start: 2018-05-26 | End: 2018-06-03 | Stop reason: HOSPADM

## 2018-05-26 RX ORDER — ASPIRIN 81 MG/1
81 TABLET ORAL DAILY
Status: DISCONTINUED | OUTPATIENT
Start: 2018-05-26 | End: 2018-06-03 | Stop reason: HOSPADM

## 2018-05-26 RX ORDER — 0.9 % SODIUM CHLORIDE 0.9 %
500 INTRAVENOUS SOLUTION INTRAVENOUS ONCE
Status: COMPLETED | OUTPATIENT
Start: 2018-05-26 | End: 2018-05-26

## 2018-05-26 RX ORDER — IPRATROPIUM BROMIDE AND ALBUTEROL SULFATE 2.5; .5 MG/3ML; MG/3ML
1 SOLUTION RESPIRATORY (INHALATION)
Status: ACTIVE | OUTPATIENT
Start: 2018-05-26 | End: 2018-05-26

## 2018-05-26 RX ORDER — SODIUM POLYSTYRENE SULFONATE 15 G/60ML
15 SUSPENSION ORAL; RECTAL ONCE
Status: COMPLETED | OUTPATIENT
Start: 2018-05-26 | End: 2018-05-26

## 2018-05-26 RX ORDER — METOPROLOL TARTRATE 50 MG/1
100 TABLET, FILM COATED ORAL 2 TIMES DAILY
Status: DISCONTINUED | OUTPATIENT
Start: 2018-05-26 | End: 2018-06-03 | Stop reason: HOSPADM

## 2018-05-26 RX ORDER — DEXTROSE MONOHYDRATE 25 G/50ML
12.5 INJECTION, SOLUTION INTRAVENOUS PRN
Status: DISCONTINUED | OUTPATIENT
Start: 2018-05-26 | End: 2018-06-03 | Stop reason: HOSPADM

## 2018-05-26 RX ORDER — MONTELUKAST SODIUM 10 MG/1
10 TABLET ORAL NIGHTLY
Status: DISCONTINUED | OUTPATIENT
Start: 2018-05-26 | End: 2018-06-03 | Stop reason: HOSPADM

## 2018-05-26 RX ORDER — FLUTICASONE PROPIONATE 50 MCG
2 SPRAY, SUSPENSION (ML) NASAL DAILY
Status: DISCONTINUED | OUTPATIENT
Start: 2018-05-26 | End: 2018-06-03 | Stop reason: HOSPADM

## 2018-05-26 RX ORDER — DOCUSATE SODIUM 100 MG/1
100 CAPSULE, LIQUID FILLED ORAL 3 TIMES DAILY PRN
Status: DISCONTINUED | OUTPATIENT
Start: 2018-05-26 | End: 2018-06-03 | Stop reason: HOSPADM

## 2018-05-26 RX ORDER — DILTIAZEM HYDROCHLORIDE 240 MG/1
240 CAPSULE, COATED, EXTENDED RELEASE ORAL 2 TIMES DAILY
Status: DISCONTINUED | OUTPATIENT
Start: 2018-05-26 | End: 2018-06-03 | Stop reason: HOSPADM

## 2018-05-26 RX ORDER — OXYCODONE AND ACETAMINOPHEN 10; 325 MG/1; MG/1
1 TABLET ORAL EVERY 4 HOURS PRN
Status: DISCONTINUED | OUTPATIENT
Start: 2018-05-26 | End: 2018-06-03 | Stop reason: HOSPADM

## 2018-05-26 RX ORDER — CETIRIZINE HYDROCHLORIDE 10 MG/1
5 TABLET ORAL DAILY
Status: DISCONTINUED | OUTPATIENT
Start: 2018-05-26 | End: 2018-06-03 | Stop reason: HOSPADM

## 2018-05-26 RX ORDER — GABAPENTIN 400 MG/1
400 CAPSULE ORAL 3 TIMES DAILY
Status: DISCONTINUED | OUTPATIENT
Start: 2018-05-26 | End: 2018-06-03 | Stop reason: HOSPADM

## 2018-05-26 RX ORDER — DEXTROSE MONOHYDRATE 25 G/50ML
25 INJECTION, SOLUTION INTRAVENOUS ONCE
Status: COMPLETED | OUTPATIENT
Start: 2018-05-27 | End: 2018-05-27

## 2018-05-26 RX ORDER — IPRATROPIUM BROMIDE AND ALBUTEROL SULFATE 2.5; .5 MG/3ML; MG/3ML
1 SOLUTION RESPIRATORY (INHALATION) EVERY 4 HOURS
Status: DISCONTINUED | OUTPATIENT
Start: 2018-05-26 | End: 2018-06-03 | Stop reason: HOSPADM

## 2018-05-26 RX ORDER — FENOFIBRATE 160 MG/1
160 TABLET ORAL DAILY
Status: DISCONTINUED | OUTPATIENT
Start: 2018-05-26 | End: 2018-06-03 | Stop reason: HOSPADM

## 2018-05-26 RX ORDER — DEXTROSE MONOHYDRATE 50 MG/ML
100 INJECTION, SOLUTION INTRAVENOUS PRN
Status: DISCONTINUED | OUTPATIENT
Start: 2018-05-26 | End: 2018-06-03 | Stop reason: HOSPADM

## 2018-05-26 RX ORDER — DEXTROSE MONOHYDRATE 25 G/50ML
25 INJECTION, SOLUTION INTRAVENOUS ONCE
Status: COMPLETED | OUTPATIENT
Start: 2018-05-26 | End: 2018-05-26

## 2018-05-26 RX ORDER — PANTOPRAZOLE SODIUM 40 MG/1
40 TABLET, DELAYED RELEASE ORAL
Status: DISCONTINUED | OUTPATIENT
Start: 2018-05-27 | End: 2018-06-03 | Stop reason: HOSPADM

## 2018-05-26 RX ORDER — FERROUS SULFATE 325(65) MG
325 TABLET ORAL
Status: DISCONTINUED | OUTPATIENT
Start: 2018-05-27 | End: 2018-06-03 | Stop reason: HOSPADM

## 2018-05-26 RX ORDER — ALPRAZOLAM 0.25 MG/1
0.5 TABLET ORAL NIGHTLY PRN
Status: DISCONTINUED | OUTPATIENT
Start: 2018-05-26 | End: 2018-06-03 | Stop reason: HOSPADM

## 2018-05-26 RX ORDER — METHYLPREDNISOLONE SODIUM SUCCINATE 125 MG/2ML
80 INJECTION, POWDER, LYOPHILIZED, FOR SOLUTION INTRAMUSCULAR; INTRAVENOUS ONCE
Status: COMPLETED | OUTPATIENT
Start: 2018-05-26 | End: 2018-05-26

## 2018-05-26 RX ADMIN — METHYLPREDNISOLONE SODIUM SUCCINATE 80 MG: 125 INJECTION, POWDER, FOR SOLUTION INTRAMUSCULAR; INTRAVENOUS at 14:26

## 2018-05-26 RX ADMIN — DILTIAZEM HYDROCHLORIDE 240 MG: 240 CAPSULE, COATED, EXTENDED RELEASE ORAL at 20:57

## 2018-05-26 RX ADMIN — SODIUM POLYSTYRENE SULFONATE 15 G: 15 SUSPENSION ORAL; RECTAL at 18:52

## 2018-05-26 RX ADMIN — OXYCODONE AND ACETAMINOPHEN 1 TABLET: 10; 325 TABLET ORAL at 20:57

## 2018-05-26 RX ADMIN — FUROSEMIDE 40 MG: 10 INJECTION, SOLUTION INTRAVENOUS at 17:36

## 2018-05-26 RX ADMIN — INSULIN LISPRO 94 UNITS: 100 INJECTION, SOLUTION INTRAVENOUS; SUBCUTANEOUS at 19:49

## 2018-05-26 RX ADMIN — DEXTROSE MONOHYDRATE 25 G: 25 INJECTION, SOLUTION INTRAVENOUS at 18:50

## 2018-05-26 RX ADMIN — METOPROLOL TARTRATE 100 MG: 50 TABLET ORAL at 20:57

## 2018-05-26 RX ADMIN — CALCIUM GLUCONATE 1 G: 98 INJECTION, SOLUTION INTRAVENOUS at 17:35

## 2018-05-26 RX ADMIN — GABAPENTIN 400 MG: 400 CAPSULE ORAL at 20:57

## 2018-05-26 RX ADMIN — IPRATROPIUM BROMIDE AND ALBUTEROL SULFATE 3 AMPULE: .5; 3 SOLUTION RESPIRATORY (INHALATION) at 14:26

## 2018-05-26 RX ADMIN — MONTELUKAST SODIUM 10 MG: 10 TABLET, FILM COATED ORAL at 20:57

## 2018-05-26 RX ADMIN — INSULIN HUMAN 10 UNITS: 100 INJECTION, SOLUTION PARENTERAL at 18:51

## 2018-05-26 RX ADMIN — IPRATROPIUM BROMIDE AND ALBUTEROL SULFATE 3 ML: .5; 3 SOLUTION RESPIRATORY (INHALATION) at 20:33

## 2018-05-26 RX ADMIN — INSULIN GLARGINE 110 UNITS: 100 INJECTION, SOLUTION SUBCUTANEOUS at 22:59

## 2018-05-26 RX ADMIN — SODIUM CHLORIDE 500 ML: 9 INJECTION, SOLUTION INTRAVENOUS at 17:03

## 2018-05-26 RX ADMIN — MOMETASONE FUROATE AND FORMOTEROL FUMARATE DIHYDRATE 2 PUFF: 200; 5 AEROSOL RESPIRATORY (INHALATION) at 20:33

## 2018-05-26 ASSESSMENT — PAIN DESCRIPTION - ORIENTATION
ORIENTATION: RIGHT
ORIENTATION: RIGHT
ORIENTATION_2: LEFT

## 2018-05-26 ASSESSMENT — PAIN DESCRIPTION - ONSET: ONSET: ON-GOING

## 2018-05-26 ASSESSMENT — PAIN DESCRIPTION - DESCRIPTORS
DESCRIPTORS: DISCOMFORT
DESCRIPTORS: THROBBING
DESCRIPTORS: THROBBING;ACHING;PINS AND NEEDLES

## 2018-05-26 ASSESSMENT — PAIN SCALES - GENERAL
PAINLEVEL_OUTOF10: 5
PAINLEVEL_OUTOF10: 6
PAINLEVEL_OUTOF10: 4

## 2018-05-26 ASSESSMENT — PAIN DESCRIPTION - FREQUENCY
FREQUENCY: CONTINUOUS
FREQUENCY: CONTINUOUS

## 2018-05-26 ASSESSMENT — PAIN DESCRIPTION - PAIN TYPE
TYPE: CHRONIC PAIN
TYPE: CHRONIC PAIN

## 2018-05-26 ASSESSMENT — PAIN DESCRIPTION - LOCATION
LOCATION_2: HAND
LOCATION: FOOT
LOCATION: FOOT

## 2018-05-26 ASSESSMENT — PAIN DESCRIPTION - INTENSITY: RATING_2: 6

## 2018-05-26 ASSESSMENT — PAIN DESCRIPTION - PROGRESSION: CLINICAL_PROGRESSION: NOT CHANGED

## 2018-05-26 ASSESSMENT — PAIN DESCRIPTION - DURATION: DURATION_2: CONTINUOUS

## 2018-05-27 LAB
ABO/RH: NORMAL
ANION GAP SERPL CALCULATED.3IONS-SCNC: 11 MMOL/L (ref 7–16)
ANION GAP SERPL CALCULATED.3IONS-SCNC: 12 MMOL/L (ref 7–16)
ANION GAP SERPL CALCULATED.3IONS-SCNC: 12 MMOL/L (ref 7–16)
ANION GAP SERPL CALCULATED.3IONS-SCNC: 13 MMOL/L (ref 7–16)
ANTIBODY SCREEN: NORMAL
BLOOD BANK DISPENSE STATUS: NORMAL
BLOOD BANK DISPENSE STATUS: NORMAL
BLOOD BANK PRODUCT CODE: NORMAL
BLOOD BANK PRODUCT CODE: NORMAL
BPU ID: NORMAL
BPU ID: NORMAL
BUN BLDV-MCNC: 73 MG/DL (ref 8–23)
BUN BLDV-MCNC: 80 MG/DL (ref 8–23)
BUN BLDV-MCNC: 82 MG/DL (ref 8–23)
BUN BLDV-MCNC: 86 MG/DL (ref 8–23)
CALCIUM SERPL-MCNC: 10 MG/DL (ref 8.6–10.2)
CALCIUM SERPL-MCNC: 10.1 MG/DL (ref 8.6–10.2)
CALCIUM SERPL-MCNC: 9.6 MG/DL (ref 8.6–10.2)
CALCIUM SERPL-MCNC: 9.7 MG/DL (ref 8.6–10.2)
CHLORIDE BLD-SCNC: 96 MMOL/L (ref 98–107)
CHLORIDE BLD-SCNC: 97 MMOL/L (ref 98–107)
CHLORIDE BLD-SCNC: 98 MMOL/L (ref 98–107)
CHLORIDE BLD-SCNC: 99 MMOL/L (ref 98–107)
CO2: 26 MMOL/L (ref 22–29)
CO2: 27 MMOL/L (ref 22–29)
CO2: 27 MMOL/L (ref 22–29)
CO2: 28 MMOL/L (ref 22–29)
CREAT SERPL-MCNC: 1.4 MG/DL (ref 0.5–1)
CREAT SERPL-MCNC: 1.6 MG/DL (ref 0.5–1)
CREAT SERPL-MCNC: 1.7 MG/DL (ref 0.5–1)
CREAT SERPL-MCNC: 1.8 MG/DL (ref 0.5–1)
DESCRIPTION BLOOD BANK: NORMAL
DESCRIPTION BLOOD BANK: NORMAL
FERRITIN: 204 NG/ML
FOLATE: 12.9 NG/ML (ref 4.8–24.2)
GFR AFRICAN AMERICAN: 34
GFR AFRICAN AMERICAN: 37
GFR AFRICAN AMERICAN: 39
GFR AFRICAN AMERICAN: 46
GFR NON-AFRICAN AMERICAN: 28 ML/MIN/1.73
GFR NON-AFRICAN AMERICAN: 30 ML/MIN/1.73
GFR NON-AFRICAN AMERICAN: 32 ML/MIN/1.73
GFR NON-AFRICAN AMERICAN: 38 ML/MIN/1.73
GLUCOSE BLD-MCNC: 188 MG/DL (ref 74–109)
GLUCOSE BLD-MCNC: 225 MG/DL (ref 74–109)
GLUCOSE BLD-MCNC: 264 MG/DL (ref 74–109)
GLUCOSE BLD-MCNC: 309 MG/DL (ref 74–109)
HCT VFR BLD CALC: 22.1 % (ref 34–48)
HEMOCCULT STL QL: ABNORMAL
HEMOGLOBIN: 6.7 G/DL (ref 11.5–15.5)
IRON SATURATION: 13 % (ref 15–50)
IRON: 45 MCG/DL (ref 37–145)
MCH RBC QN AUTO: 26.6 PG (ref 26–35)
MCHC RBC AUTO-ENTMCNC: 30.3 % (ref 32–34.5)
MCV RBC AUTO: 87.7 FL (ref 80–99.9)
METER GLUCOSE: 145 MG/DL (ref 70–110)
METER GLUCOSE: 214 MG/DL (ref 70–110)
METER GLUCOSE: 233 MG/DL (ref 70–110)
METER GLUCOSE: 271 MG/DL (ref 70–110)
METER GLUCOSE: 275 MG/DL (ref 70–110)
METER GLUCOSE: 291 MG/DL (ref 70–110)
METER GLUCOSE: 311 MG/DL (ref 70–110)
METER GLUCOSE: 343 MG/DL (ref 70–110)
PDW BLD-RTO: 15.6 FL (ref 11.5–15)
PLATELET # BLD: 207 E9/L (ref 130–450)
PMV BLD AUTO: 10.7 FL (ref 7–12)
POTASSIUM SERPL-SCNC: 6 MMOL/L (ref 3.5–5)
POTASSIUM SERPL-SCNC: 6.3 MMOL/L (ref 3.5–5)
POTASSIUM SERPL-SCNC: 6.7 MMOL/L (ref 3.5–5)
POTASSIUM SERPL-SCNC: 6.8 MMOL/L (ref 3.5–5)
POTASSIUM SERPL-SCNC: 7.1 MMOL/L (ref 3.5–5)
RBC # BLD: 2.52 E12/L (ref 3.5–5.5)
SODIUM BLD-SCNC: 135 MMOL/L (ref 132–146)
SODIUM BLD-SCNC: 136 MMOL/L (ref 132–146)
SODIUM BLD-SCNC: 137 MMOL/L (ref 132–146)
SODIUM BLD-SCNC: 138 MMOL/L (ref 132–146)
TOTAL IRON BINDING CAPACITY: 347 MCG/DL (ref 250–450)
WBC # BLD: 5.7 E9/L (ref 4.5–11.5)

## 2018-05-27 PROCEDURE — 6370000000 HC RX 637 (ALT 250 FOR IP): Performed by: NURSE PRACTITIONER

## 2018-05-27 PROCEDURE — 2580000003 HC RX 258: Performed by: INTERNAL MEDICINE

## 2018-05-27 PROCEDURE — 6370000000 HC RX 637 (ALT 250 FOR IP): Performed by: INTERNAL MEDICINE

## 2018-05-27 PROCEDURE — 36415 COLL VENOUS BLD VENIPUNCTURE: CPT

## 2018-05-27 PROCEDURE — 2060000000 HC ICU INTERMEDIATE R&B

## 2018-05-27 PROCEDURE — 82962 GLUCOSE BLOOD TEST: CPT

## 2018-05-27 PROCEDURE — 84132 ASSAY OF SERUM POTASSIUM: CPT

## 2018-05-27 PROCEDURE — 86850 RBC ANTIBODY SCREEN: CPT

## 2018-05-27 PROCEDURE — 83540 ASSAY OF IRON: CPT

## 2018-05-27 PROCEDURE — 86900 BLOOD TYPING SEROLOGIC ABO: CPT

## 2018-05-27 PROCEDURE — 86901 BLOOD TYPING SEROLOGIC RH(D): CPT

## 2018-05-27 PROCEDURE — 85027 COMPLETE CBC AUTOMATED: CPT

## 2018-05-27 PROCEDURE — 93005 ELECTROCARDIOGRAM TRACING: CPT | Performed by: NURSE PRACTITIONER

## 2018-05-27 PROCEDURE — 2500000003 HC RX 250 WO HCPCS: Performed by: INTERNAL MEDICINE

## 2018-05-27 PROCEDURE — 83550 IRON BINDING TEST: CPT

## 2018-05-27 PROCEDURE — 80048 BASIC METABOLIC PNL TOTAL CA: CPT

## 2018-05-27 PROCEDURE — 6360000002 HC RX W HCPCS: Performed by: INTERNAL MEDICINE

## 2018-05-27 PROCEDURE — P9016 RBC LEUKOCYTES REDUCED: HCPCS

## 2018-05-27 PROCEDURE — 82746 ASSAY OF FOLIC ACID SERUM: CPT

## 2018-05-27 PROCEDURE — 2500000003 HC RX 250 WO HCPCS: Performed by: NURSE PRACTITIONER

## 2018-05-27 PROCEDURE — 82728 ASSAY OF FERRITIN: CPT

## 2018-05-27 PROCEDURE — 94640 AIRWAY INHALATION TREATMENT: CPT

## 2018-05-27 PROCEDURE — 86920 COMPATIBILITY TEST SPIN: CPT

## 2018-05-27 RX ORDER — 0.9 % SODIUM CHLORIDE 0.9 %
250 INTRAVENOUS SOLUTION INTRAVENOUS ONCE
Status: DISCONTINUED | OUTPATIENT
Start: 2018-05-27 | End: 2018-06-03 | Stop reason: HOSPADM

## 2018-05-27 RX ORDER — DEXTROSE MONOHYDRATE 25 G/50ML
25 INJECTION, SOLUTION INTRAVENOUS PRN
Status: DISCONTINUED | OUTPATIENT
Start: 2018-05-27 | End: 2018-05-27

## 2018-05-27 RX ORDER — DEXTROSE MONOHYDRATE 25 G/50ML
12.5 INJECTION, SOLUTION INTRAVENOUS ONCE
Status: COMPLETED | OUTPATIENT
Start: 2018-05-27 | End: 2018-05-27

## 2018-05-27 RX ORDER — DEXTROSE MONOHYDRATE 25 G/50ML
25 INJECTION, SOLUTION INTRAVENOUS ONCE
Status: COMPLETED | OUTPATIENT
Start: 2018-05-27 | End: 2018-05-27

## 2018-05-27 RX ORDER — SODIUM CHLORIDE 0.9 % (FLUSH) 0.9 %
10 SYRINGE (ML) INJECTION 2 TIMES DAILY
Status: DISCONTINUED | OUTPATIENT
Start: 2018-05-27 | End: 2018-06-03 | Stop reason: HOSPADM

## 2018-05-27 RX ORDER — SODIUM CHLORIDE 0.9 % (FLUSH) 0.9 %
10 SYRINGE (ML) INJECTION PRN
Status: DISCONTINUED | OUTPATIENT
Start: 2018-05-27 | End: 2018-06-03 | Stop reason: HOSPADM

## 2018-05-27 RX ORDER — SODIUM POLYSTYRENE SULFONATE 15 G/60ML
15 SUSPENSION ORAL; RECTAL ONCE
Status: COMPLETED | OUTPATIENT
Start: 2018-05-27 | End: 2018-05-27

## 2018-05-27 RX ORDER — SODIUM POLYSTYRENE SULFONATE 15 G/60ML
15 SUSPENSION ORAL; RECTAL EVERY 4 HOURS
Status: COMPLETED | OUTPATIENT
Start: 2018-05-27 | End: 2018-05-28

## 2018-05-27 RX ORDER — SODIUM POLYSTYRENE SULFONATE 15 G/60ML
15 SUSPENSION ORAL; RECTAL EVERY 4 HOURS
Status: DISCONTINUED | OUTPATIENT
Start: 2018-05-27 | End: 2018-05-27

## 2018-05-27 RX ORDER — SODIUM CHLORIDE 9 MG/ML
INJECTION, SOLUTION INTRAVENOUS CONTINUOUS
Status: DISCONTINUED | OUTPATIENT
Start: 2018-05-27 | End: 2018-05-29

## 2018-05-27 RX ADMIN — ASPIRIN 81 MG: 81 TABLET, COATED ORAL at 08:53

## 2018-05-27 RX ADMIN — OXYCODONE AND ACETAMINOPHEN 1 TABLET: 10; 325 TABLET ORAL at 20:14

## 2018-05-27 RX ADMIN — INSULIN LISPRO 90 UNITS: 100 INJECTION, SOLUTION INTRAVENOUS; SUBCUTANEOUS at 09:04

## 2018-05-27 RX ADMIN — FENOFIBRATE 160 MG: 160 TABLET ORAL at 08:53

## 2018-05-27 RX ADMIN — INSULIN HUMAN 10 UNITS: 100 INJECTION, SOLUTION PARENTERAL at 00:12

## 2018-05-27 RX ADMIN — DEXTROSE MONOHYDRATE 25 G: 25 INJECTION, SOLUTION INTRAVENOUS at 00:12

## 2018-05-27 RX ADMIN — IPRATROPIUM BROMIDE AND ALBUTEROL SULFATE 3 ML: .5; 3 SOLUTION RESPIRATORY (INHALATION) at 08:13

## 2018-05-27 RX ADMIN — DEXTROSE MONOHYDRATE 25 G: 25 INJECTION, SOLUTION INTRAVENOUS at 16:58

## 2018-05-27 RX ADMIN — METOPROLOL TARTRATE 100 MG: 50 TABLET ORAL at 08:53

## 2018-05-27 RX ADMIN — FERROUS SULFATE TAB 325 MG (65 MG ELEMENTAL FE) 325 MG: 325 (65 FE) TAB at 08:53

## 2018-05-27 RX ADMIN — INSULIN HUMAN 10 UNITS: 100 INJECTION, SOLUTION PARENTERAL at 13:10

## 2018-05-27 RX ADMIN — IPRATROPIUM BROMIDE AND ALBUTEROL SULFATE 3 ML: .5; 3 SOLUTION RESPIRATORY (INHALATION) at 00:34

## 2018-05-27 RX ADMIN — DILTIAZEM HYDROCHLORIDE 240 MG: 240 CAPSULE, COATED, EXTENDED RELEASE ORAL at 08:53

## 2018-05-27 RX ADMIN — CALCIUM GLUCONATE 1 G: 98 INJECTION, SOLUTION INTRAVENOUS at 04:40

## 2018-05-27 RX ADMIN — MONTELUKAST SODIUM 10 MG: 10 TABLET, FILM COATED ORAL at 20:14

## 2018-05-27 RX ADMIN — DILTIAZEM HYDROCHLORIDE 240 MG: 240 CAPSULE, COATED, EXTENDED RELEASE ORAL at 20:14

## 2018-05-27 RX ADMIN — IPRATROPIUM BROMIDE AND ALBUTEROL SULFATE 3 ML: .5; 3 SOLUTION RESPIRATORY (INHALATION) at 03:32

## 2018-05-27 RX ADMIN — METOPROLOL TARTRATE 100 MG: 50 TABLET ORAL at 20:14

## 2018-05-27 RX ADMIN — CALCIUM GLUCONATE 1 G: 98 INJECTION, SOLUTION INTRAVENOUS at 17:07

## 2018-05-27 RX ADMIN — IPRATROPIUM BROMIDE AND ALBUTEROL SULFATE 3 ML: .5; 3 SOLUTION RESPIRATORY (INHALATION) at 20:39

## 2018-05-27 RX ADMIN — ALPRAZOLAM 0.5 MG: 0.25 TABLET ORAL at 02:51

## 2018-05-27 RX ADMIN — CALCIUM GLUCONATE 1 G: 94 INJECTION, SOLUTION INTRAVENOUS at 11:37

## 2018-05-27 RX ADMIN — Medication 10 ML: at 20:18

## 2018-05-27 RX ADMIN — INSULIN HUMAN 10 UNITS: 100 INJECTION, SOLUTION PARENTERAL at 22:38

## 2018-05-27 RX ADMIN — INSULIN LISPRO 6 UNITS: 100 INJECTION, SOLUTION INTRAVENOUS; SUBCUTANEOUS at 17:57

## 2018-05-27 RX ADMIN — INSULIN LISPRO 3 UNITS: 100 INJECTION, SOLUTION INTRAVENOUS; SUBCUTANEOUS at 20:15

## 2018-05-27 RX ADMIN — IPRATROPIUM BROMIDE AND ALBUTEROL SULFATE 3 ML: .5; 3 SOLUTION RESPIRATORY (INHALATION) at 16:23

## 2018-05-27 RX ADMIN — INSULIN HUMAN 10 UNITS: 100 INJECTION, SOLUTION PARENTERAL at 04:48

## 2018-05-27 RX ADMIN — DEXTROSE MONOHYDRATE 25 G: 25 INJECTION, SOLUTION INTRAVENOUS at 22:38

## 2018-05-27 RX ADMIN — MICONAZOLE NITRATE: 20 POWDER TOPICAL at 15:40

## 2018-05-27 RX ADMIN — ESCITALOPRAM OXALATE 20 MG: 10 TABLET, FILM COATED ORAL at 08:53

## 2018-05-27 RX ADMIN — SODIUM CHLORIDE: 9 INJECTION, SOLUTION INTRAVENOUS at 13:10

## 2018-05-27 RX ADMIN — Medication 10 ML: at 12:55

## 2018-05-27 RX ADMIN — OXYCODONE AND ACETAMINOPHEN 1 TABLET: 10; 325 TABLET ORAL at 15:40

## 2018-05-27 RX ADMIN — GABAPENTIN 400 MG: 400 CAPSULE ORAL at 08:53

## 2018-05-27 RX ADMIN — GABAPENTIN 400 MG: 400 CAPSULE ORAL at 20:14

## 2018-05-27 RX ADMIN — INSULIN HUMAN 10 UNITS: 100 INJECTION, SOLUTION PARENTERAL at 08:52

## 2018-05-27 RX ADMIN — OXYCODONE AND ACETAMINOPHEN 1 TABLET: 10; 325 TABLET ORAL at 08:52

## 2018-05-27 RX ADMIN — CALCIUM GLUCONATE 1 G: 98 INJECTION, SOLUTION INTRAVENOUS at 00:38

## 2018-05-27 RX ADMIN — Medication 10 ML: at 12:54

## 2018-05-27 RX ADMIN — CALCIUM GLUCONATE 1 G: 94 INJECTION, SOLUTION INTRAVENOUS at 21:55

## 2018-05-27 RX ADMIN — SODIUM POLYSTYRENE SULFONATE 15 G: 15 SUSPENSION ORAL; RECTAL at 17:46

## 2018-05-27 RX ADMIN — MOMETASONE FUROATE AND FORMOTEROL FUMARATE DIHYDRATE 2 PUFF: 200; 5 AEROSOL RESPIRATORY (INHALATION) at 08:13

## 2018-05-27 RX ADMIN — MICONAZOLE NITRATE: 20 POWDER TOPICAL at 20:14

## 2018-05-27 RX ADMIN — SODIUM POLYSTYRENE SULFONATE 15 G: 15 SUSPENSION ORAL; RECTAL at 20:30

## 2018-05-27 RX ADMIN — SODIUM POLYSTYRENE SULFONATE 15 G: 15 SUSPENSION ORAL; RECTAL at 22:03

## 2018-05-27 RX ADMIN — SODIUM POLYSTYRENE SULFONATE 15 G: 15 SUSPENSION ORAL; RECTAL at 04:40

## 2018-05-27 RX ADMIN — SODIUM BICARBONATE 50 MEQ: 84 INJECTION, SOLUTION INTRAVENOUS at 16:58

## 2018-05-27 RX ADMIN — INSULIN GLARGINE 110 UNITS: 100 INJECTION, SOLUTION SUBCUTANEOUS at 20:15

## 2018-05-27 RX ADMIN — DEXTROSE MONOHYDRATE 25 G: 25 INJECTION, SOLUTION INTRAVENOUS at 04:48

## 2018-05-27 RX ADMIN — PANTOPRAZOLE SODIUM 40 MG: 40 TABLET, DELAYED RELEASE ORAL at 06:27

## 2018-05-27 RX ADMIN — OXYCODONE AND ACETAMINOPHEN 1 TABLET: 10; 325 TABLET ORAL at 02:51

## 2018-05-27 RX ADMIN — DEXTROSE MONOHYDRATE 12.5 G: 25 INJECTION, SOLUTION INTRAVENOUS at 08:52

## 2018-05-27 RX ADMIN — IPRATROPIUM BROMIDE AND ALBUTEROL SULFATE 3 ML: .5; 3 SOLUTION RESPIRATORY (INHALATION) at 12:21

## 2018-05-27 RX ADMIN — MOMETASONE FUROATE AND FORMOTEROL FUMARATE DIHYDRATE 2 PUFF: 200; 5 AEROSOL RESPIRATORY (INHALATION) at 20:39

## 2018-05-27 RX ADMIN — FLUTICASONE PROPIONATE 2 SPRAY: 50 SPRAY, METERED NASAL at 08:52

## 2018-05-27 RX ADMIN — INSULIN HUMAN 10 UNITS: 100 INJECTION, SOLUTION PARENTERAL at 16:58

## 2018-05-27 RX ADMIN — GABAPENTIN 400 MG: 400 CAPSULE ORAL at 13:53

## 2018-05-27 RX ADMIN — DEXTROSE MONOHYDRATE 25 G: 25 INJECTION, SOLUTION INTRAVENOUS at 13:10

## 2018-05-27 RX ADMIN — CETIRIZINE HYDROCHLORIDE 5 MG: 10 TABLET, FILM COATED ORAL at 08:53

## 2018-05-27 ASSESSMENT — PAIN SCALES - GENERAL
PAINLEVEL_OUTOF10: 0
PAINLEVEL_OUTOF10: 7
PAINLEVEL_OUTOF10: 5
PAINLEVEL_OUTOF10: 5
PAINLEVEL_OUTOF10: 7
PAINLEVEL_OUTOF10: 0
PAINLEVEL_OUTOF10: 0
PAINLEVEL_OUTOF10: 3
PAINLEVEL_OUTOF10: 0
PAINLEVEL_OUTOF10: 0

## 2018-05-27 ASSESSMENT — PAIN DESCRIPTION - ORIENTATION
ORIENTATION: RIGHT
ORIENTATION: RIGHT

## 2018-05-27 ASSESSMENT — PAIN DESCRIPTION - FREQUENCY: FREQUENCY: CONTINUOUS

## 2018-05-27 ASSESSMENT — PAIN DESCRIPTION - DESCRIPTORS
DESCRIPTORS: ACHING;DISCOMFORT
DESCRIPTORS: HEADACHE
DESCRIPTORS: THROBBING;NUMBNESS;ACHING

## 2018-05-27 ASSESSMENT — PAIN DESCRIPTION - LOCATION
LOCATION: FOOT
LOCATION: LEG
LOCATION: HEAD

## 2018-05-27 ASSESSMENT — PAIN SCALES - WONG BAKER: WONGBAKER_NUMERICALRESPONSE: 0

## 2018-05-27 ASSESSMENT — PAIN DESCRIPTION - PAIN TYPE
TYPE: ACUTE PAIN
TYPE: CHRONIC PAIN
TYPE: CHRONIC PAIN

## 2018-05-28 LAB
ALBUMIN SERPL-MCNC: 4 G/DL (ref 3.5–5.2)
ALP BLD-CCNC: 41 U/L (ref 35–104)
ALT SERPL-CCNC: 12 U/L (ref 0–32)
ANION GAP SERPL CALCULATED.3IONS-SCNC: 11 MMOL/L (ref 7–16)
ANION GAP SERPL CALCULATED.3IONS-SCNC: 9 MMOL/L (ref 7–16)
AST SERPL-CCNC: 12 U/L (ref 0–31)
BILIRUB SERPL-MCNC: <0.2 MG/DL (ref 0–1.2)
BILIRUBIN DIRECT: <0.2 MG/DL (ref 0–0.3)
BILIRUBIN, INDIRECT: ABNORMAL MG/DL (ref 0–1)
BUN BLDV-MCNC: 65 MG/DL (ref 8–23)
BUN BLDV-MCNC: 69 MG/DL (ref 8–23)
CALCIUM SERPL-MCNC: 10.1 MG/DL (ref 8.6–10.2)
CALCIUM SERPL-MCNC: 10.3 MG/DL (ref 8.6–10.2)
CHLORIDE BLD-SCNC: 98 MMOL/L (ref 98–107)
CHLORIDE BLD-SCNC: 98 MMOL/L (ref 98–107)
CO2: 29 MMOL/L (ref 22–29)
CO2: 31 MMOL/L (ref 22–29)
CREAT SERPL-MCNC: 1.3 MG/DL (ref 0.5–1)
CREAT SERPL-MCNC: 1.3 MG/DL (ref 0.5–1)
GFR AFRICAN AMERICAN: 50
GFR AFRICAN AMERICAN: 50
GFR NON-AFRICAN AMERICAN: 41 ML/MIN/1.73
GFR NON-AFRICAN AMERICAN: 41 ML/MIN/1.73
GLUCOSE BLD-MCNC: 218 MG/DL (ref 74–109)
GLUCOSE BLD-MCNC: 218 MG/DL (ref 74–109)
HCT VFR BLD CALC: 27 % (ref 34–48)
HEMOGLOBIN: 8.4 G/DL (ref 11.5–15.5)
MAGNESIUM: 2.3 MG/DL (ref 1.6–2.6)
MCH RBC QN AUTO: 26.8 PG (ref 26–35)
MCHC RBC AUTO-ENTMCNC: 31.1 % (ref 32–34.5)
MCV RBC AUTO: 86.3 FL (ref 80–99.9)
METER GLUCOSE: 109 MG/DL (ref 70–110)
METER GLUCOSE: 148 MG/DL (ref 70–110)
METER GLUCOSE: 158 MG/DL (ref 70–110)
METER GLUCOSE: 196 MG/DL (ref 70–110)
METER GLUCOSE: 238 MG/DL (ref 70–110)
METER GLUCOSE: 257 MG/DL (ref 70–110)
PDW BLD-RTO: 16 FL (ref 11.5–15)
PHOSPHORUS: 3.7 MG/DL (ref 2.5–4.5)
PLATELET # BLD: 239 E9/L (ref 130–450)
PMV BLD AUTO: 10.5 FL (ref 7–12)
POTASSIUM SERPL-SCNC: 5.5 MMOL/L (ref 3.5–5)
POTASSIUM SERPL-SCNC: 5.7 MMOL/L (ref 3.5–5)
RBC # BLD: 3.13 E12/L (ref 3.5–5.5)
SODIUM BLD-SCNC: 138 MMOL/L (ref 132–146)
SODIUM BLD-SCNC: 138 MMOL/L (ref 132–146)
TOTAL PROTEIN: 6.2 G/DL (ref 6.4–8.3)
WBC # BLD: 8.1 E9/L (ref 4.5–11.5)

## 2018-05-28 PROCEDURE — 6370000000 HC RX 637 (ALT 250 FOR IP): Performed by: NURSE PRACTITIONER

## 2018-05-28 PROCEDURE — 6360000002 HC RX W HCPCS: Performed by: INTERNAL MEDICINE

## 2018-05-28 PROCEDURE — 82962 GLUCOSE BLOOD TEST: CPT

## 2018-05-28 PROCEDURE — 2060000000 HC ICU INTERMEDIATE R&B

## 2018-05-28 PROCEDURE — 80048 BASIC METABOLIC PNL TOTAL CA: CPT

## 2018-05-28 PROCEDURE — 83735 ASSAY OF MAGNESIUM: CPT

## 2018-05-28 PROCEDURE — 36415 COLL VENOUS BLD VENIPUNCTURE: CPT

## 2018-05-28 PROCEDURE — 94640 AIRWAY INHALATION TREATMENT: CPT

## 2018-05-28 PROCEDURE — 84100 ASSAY OF PHOSPHORUS: CPT

## 2018-05-28 PROCEDURE — 80076 HEPATIC FUNCTION PANEL: CPT

## 2018-05-28 PROCEDURE — 2580000003 HC RX 258: Performed by: INTERNAL MEDICINE

## 2018-05-28 PROCEDURE — 85027 COMPLETE CBC AUTOMATED: CPT

## 2018-05-28 PROCEDURE — 6370000000 HC RX 637 (ALT 250 FOR IP): Performed by: INTERNAL MEDICINE

## 2018-05-28 RX ORDER — DEXTROSE MONOHYDRATE 25 G/50ML
25 INJECTION, SOLUTION INTRAVENOUS ONCE
Status: COMPLETED | OUTPATIENT
Start: 2018-05-28 | End: 2018-05-28

## 2018-05-28 RX ADMIN — ALPRAZOLAM 0.5 MG: 0.25 TABLET ORAL at 23:57

## 2018-05-28 RX ADMIN — DEXTROSE MONOHYDRATE 25 G: 25 INJECTION, SOLUTION INTRAVENOUS at 04:22

## 2018-05-28 RX ADMIN — OXYCODONE AND ACETAMINOPHEN 1 TABLET: 10; 325 TABLET ORAL at 18:49

## 2018-05-28 RX ADMIN — CETIRIZINE HYDROCHLORIDE 5 MG: 10 TABLET, FILM COATED ORAL at 08:11

## 2018-05-28 RX ADMIN — FLUTICASONE PROPIONATE 2 SPRAY: 50 SPRAY, METERED NASAL at 08:12

## 2018-05-28 RX ADMIN — Medication 10 ML: at 08:12

## 2018-05-28 RX ADMIN — INSULIN LISPRO 2 UNITS: 100 INJECTION, SOLUTION INTRAVENOUS; SUBCUTANEOUS at 15:40

## 2018-05-28 RX ADMIN — MICONAZOLE NITRATE: 20 POWDER TOPICAL at 21:34

## 2018-05-28 RX ADMIN — SODIUM CHLORIDE: 9 INJECTION, SOLUTION INTRAVENOUS at 05:31

## 2018-05-28 RX ADMIN — PANTOPRAZOLE SODIUM 40 MG: 40 TABLET, DELAYED RELEASE ORAL at 06:37

## 2018-05-28 RX ADMIN — IPRATROPIUM BROMIDE AND ALBUTEROL SULFATE 3 ML: .5; 3 SOLUTION RESPIRATORY (INHALATION) at 08:37

## 2018-05-28 RX ADMIN — INSULIN LISPRO 2 UNITS: 100 INJECTION, SOLUTION INTRAVENOUS; SUBCUTANEOUS at 11:15

## 2018-05-28 RX ADMIN — OXYCODONE AND ACETAMINOPHEN 1 TABLET: 10; 325 TABLET ORAL at 23:56

## 2018-05-28 RX ADMIN — FENOFIBRATE 160 MG: 160 TABLET ORAL at 08:11

## 2018-05-28 RX ADMIN — ASPIRIN 81 MG: 81 TABLET, COATED ORAL at 08:11

## 2018-05-28 RX ADMIN — GABAPENTIN 400 MG: 400 CAPSULE ORAL at 21:22

## 2018-05-28 RX ADMIN — INSULIN HUMAN 10 UNITS: 100 INJECTION, SOLUTION PARENTERAL at 01:16

## 2018-05-28 RX ADMIN — DILTIAZEM HYDROCHLORIDE 240 MG: 240 CAPSULE, COATED, EXTENDED RELEASE ORAL at 08:11

## 2018-05-28 RX ADMIN — MICONAZOLE NITRATE: 20 POWDER TOPICAL at 08:12

## 2018-05-28 RX ADMIN — SODIUM POLYSTYRENE SULFONATE 15 G: 15 SUSPENSION ORAL; RECTAL at 01:16

## 2018-05-28 RX ADMIN — IPRATROPIUM BROMIDE AND ALBUTEROL SULFATE 3 ML: .5; 3 SOLUTION RESPIRATORY (INHALATION) at 00:13

## 2018-05-28 RX ADMIN — METOPROLOL TARTRATE 100 MG: 50 TABLET ORAL at 08:11

## 2018-05-28 RX ADMIN — METOPROLOL TARTRATE 100 MG: 50 TABLET ORAL at 21:21

## 2018-05-28 RX ADMIN — MOMETASONE FUROATE AND FORMOTEROL FUMARATE DIHYDRATE 2 PUFF: 200; 5 AEROSOL RESPIRATORY (INHALATION) at 08:11

## 2018-05-28 RX ADMIN — MONTELUKAST SODIUM 10 MG: 10 TABLET, FILM COATED ORAL at 21:22

## 2018-05-28 RX ADMIN — MOMETASONE FUROATE AND FORMOTEROL FUMARATE DIHYDRATE 2 PUFF: 200; 5 AEROSOL RESPIRATORY (INHALATION) at 21:16

## 2018-05-28 RX ADMIN — GABAPENTIN 400 MG: 400 CAPSULE ORAL at 13:33

## 2018-05-28 RX ADMIN — CALCIUM GLUCONATE 1 G: 98 INJECTION, SOLUTION INTRAVENOUS at 04:22

## 2018-05-28 RX ADMIN — GABAPENTIN 400 MG: 400 CAPSULE ORAL at 08:11

## 2018-05-28 RX ADMIN — OXYCODONE AND ACETAMINOPHEN 1 TABLET: 10; 325 TABLET ORAL at 05:28

## 2018-05-28 RX ADMIN — IPRATROPIUM BROMIDE AND ALBUTEROL SULFATE 3 ML: .5; 3 SOLUTION RESPIRATORY (INHALATION) at 16:21

## 2018-05-28 RX ADMIN — INSULIN LISPRO 2 UNITS: 100 INJECTION, SOLUTION INTRAVENOUS; SUBCUTANEOUS at 21:29

## 2018-05-28 RX ADMIN — IPRATROPIUM BROMIDE AND ALBUTEROL SULFATE 3 ML: .5; 3 SOLUTION RESPIRATORY (INHALATION) at 21:16

## 2018-05-28 RX ADMIN — IPRATROPIUM BROMIDE AND ALBUTEROL SULFATE 3 ML: .5; 3 SOLUTION RESPIRATORY (INHALATION) at 14:14

## 2018-05-28 RX ADMIN — OXYCODONE AND ACETAMINOPHEN 1 TABLET: 10; 325 TABLET ORAL at 12:51

## 2018-05-28 RX ADMIN — DEXTROSE MONOHYDRATE 25 G: 25 INJECTION, SOLUTION INTRAVENOUS at 01:15

## 2018-05-28 RX ADMIN — DILTIAZEM HYDROCHLORIDE 240 MG: 240 CAPSULE, COATED, EXTENDED RELEASE ORAL at 21:21

## 2018-05-28 RX ADMIN — FERROUS SULFATE TAB 325 MG (65 MG ELEMENTAL FE) 325 MG: 325 (65 FE) TAB at 08:11

## 2018-05-28 RX ADMIN — CALCIUM GLUCONATE 1 G: 94 INJECTION, SOLUTION INTRAVENOUS at 01:27

## 2018-05-28 RX ADMIN — ESCITALOPRAM OXALATE 20 MG: 10 TABLET, FILM COATED ORAL at 08:11

## 2018-05-28 RX ADMIN — OXYCODONE AND ACETAMINOPHEN 1 TABLET: 10; 325 TABLET ORAL at 01:15

## 2018-05-28 RX ADMIN — INSULIN GLARGINE 110 UNITS: 100 INJECTION, SOLUTION SUBCUTANEOUS at 21:27

## 2018-05-28 RX ADMIN — INSULIN HUMAN 10 UNITS: 100 INJECTION, SOLUTION PARENTERAL at 04:22

## 2018-05-28 RX ADMIN — IPRATROPIUM BROMIDE AND ALBUTEROL SULFATE 3 ML: .5; 3 SOLUTION RESPIRATORY (INHALATION) at 05:24

## 2018-05-28 RX ADMIN — ALPRAZOLAM 0.5 MG: 0.25 TABLET ORAL at 01:15

## 2018-05-28 ASSESSMENT — PAIN SCALES - GENERAL
PAINLEVEL_OUTOF10: 6
PAINLEVEL_OUTOF10: 0
PAINLEVEL_OUTOF10: 7
PAINLEVEL_OUTOF10: 0
PAINLEVEL_OUTOF10: 0
PAINLEVEL_OUTOF10: 7
PAINLEVEL_OUTOF10: 0
PAINLEVEL_OUTOF10: 7
PAINLEVEL_OUTOF10: 0
PAINLEVEL_OUTOF10: 7

## 2018-05-28 ASSESSMENT — PAIN DESCRIPTION - PAIN TYPE
TYPE: CHRONIC PAIN

## 2018-05-28 ASSESSMENT — PAIN DESCRIPTION - ORIENTATION
ORIENTATION: RIGHT

## 2018-05-28 ASSESSMENT — PAIN DESCRIPTION - FREQUENCY
FREQUENCY: INTERMITTENT
FREQUENCY: CONTINUOUS

## 2018-05-28 ASSESSMENT — PAIN DESCRIPTION - LOCATION
LOCATION: LEG

## 2018-05-28 ASSESSMENT — PAIN DESCRIPTION - DESCRIPTORS
DESCRIPTORS: ACHING
DESCRIPTORS: ACHING

## 2018-05-29 LAB
ALBUMIN SERPL-MCNC: 3.7 G/DL (ref 3.5–5.2)
ALP BLD-CCNC: 41 U/L (ref 35–104)
ALT SERPL-CCNC: 12 U/L (ref 0–32)
ANION GAP SERPL CALCULATED.3IONS-SCNC: 7 MMOL/L (ref 7–16)
AST SERPL-CCNC: 13 U/L (ref 0–31)
BASOPHILS ABSOLUTE: 0.04 E9/L (ref 0–0.2)
BASOPHILS RELATIVE PERCENT: 0.6 % (ref 0–2)
BILIRUB SERPL-MCNC: <0.2 MG/DL (ref 0–1.2)
BILIRUBIN DIRECT: <0.2 MG/DL (ref 0–0.3)
BILIRUBIN, INDIRECT: ABNORMAL MG/DL (ref 0–1)
BUN BLDV-MCNC: 41 MG/DL (ref 8–23)
CALCIUM SERPL-MCNC: 9.4 MG/DL (ref 8.6–10.2)
CHLORIDE BLD-SCNC: 101 MMOL/L (ref 98–107)
CO2: 32 MMOL/L (ref 22–29)
CREAT SERPL-MCNC: 1 MG/DL (ref 0.5–1)
EKG ATRIAL RATE: 91 BPM
EKG P AXIS: 62 DEGREES
EKG P-R INTERVAL: 152 MS
EKG Q-T INTERVAL: 366 MS
EKG QRS DURATION: 112 MS
EKG QTC CALCULATION (BAZETT): 450 MS
EKG R AXIS: 12 DEGREES
EKG T AXIS: 32 DEGREES
EKG VENTRICULAR RATE: 91 BPM
EOSINOPHILS ABSOLUTE: 0.22 E9/L (ref 0.05–0.5)
EOSINOPHILS RELATIVE PERCENT: 3.4 % (ref 0–6)
GFR AFRICAN AMERICAN: >60
GFR NON-AFRICAN AMERICAN: 56 ML/MIN/1.73
GLUCOSE BLD-MCNC: 128 MG/DL (ref 74–109)
HCT VFR BLD CALC: 27.5 % (ref 34–48)
HEMOGLOBIN: 8.4 G/DL (ref 11.5–15.5)
IMMATURE GRANULOCYTES #: 0.02 E9/L
IMMATURE GRANULOCYTES %: 0.3 % (ref 0–5)
LYMPHOCYTES ABSOLUTE: 1.79 E9/L (ref 1.5–4)
LYMPHOCYTES RELATIVE PERCENT: 27.9 % (ref 20–42)
MCH RBC QN AUTO: 26.8 PG (ref 26–35)
MCHC RBC AUTO-ENTMCNC: 30.5 % (ref 32–34.5)
MCV RBC AUTO: 87.6 FL (ref 80–99.9)
METER GLUCOSE: 115 MG/DL (ref 70–110)
METER GLUCOSE: 178 MG/DL (ref 70–110)
METER GLUCOSE: 232 MG/DL (ref 70–110)
METER GLUCOSE: 91 MG/DL (ref 70–110)
MONOCYTES ABSOLUTE: 0.49 E9/L (ref 0.1–0.95)
MONOCYTES RELATIVE PERCENT: 7.6 % (ref 2–12)
NEUTROPHILS ABSOLUTE: 3.85 E9/L (ref 1.8–7.3)
NEUTROPHILS RELATIVE PERCENT: 60.2 % (ref 43–80)
PDW BLD-RTO: 15.9 FL (ref 11.5–15)
PLATELET # BLD: 204 E9/L (ref 130–450)
PMV BLD AUTO: 9.7 FL (ref 7–12)
POTASSIUM SERPL-SCNC: 4.7 MMOL/L (ref 3.5–5)
RBC # BLD: 3.14 E12/L (ref 3.5–5.5)
SODIUM BLD-SCNC: 140 MMOL/L (ref 132–146)
TOTAL PROTEIN: 5.9 G/DL (ref 6.4–8.3)
WBC # BLD: 6.4 E9/L (ref 4.5–11.5)

## 2018-05-29 PROCEDURE — 85025 COMPLETE CBC W/AUTO DIFF WBC: CPT

## 2018-05-29 PROCEDURE — 2580000003 HC RX 258: Performed by: INTERNAL MEDICINE

## 2018-05-29 PROCEDURE — 80048 BASIC METABOLIC PNL TOTAL CA: CPT

## 2018-05-29 PROCEDURE — 97165 OT EVAL LOW COMPLEX 30 MIN: CPT

## 2018-05-29 PROCEDURE — G8987 SELF CARE CURRENT STATUS: HCPCS

## 2018-05-29 PROCEDURE — 94640 AIRWAY INHALATION TREATMENT: CPT

## 2018-05-29 PROCEDURE — 97161 PT EVAL LOW COMPLEX 20 MIN: CPT

## 2018-05-29 PROCEDURE — 6370000000 HC RX 637 (ALT 250 FOR IP): Performed by: NURSE PRACTITIONER

## 2018-05-29 PROCEDURE — 80076 HEPATIC FUNCTION PANEL: CPT

## 2018-05-29 PROCEDURE — G8988 SELF CARE GOAL STATUS: HCPCS

## 2018-05-29 PROCEDURE — 82962 GLUCOSE BLOOD TEST: CPT

## 2018-05-29 PROCEDURE — 2060000000 HC ICU INTERMEDIATE R&B

## 2018-05-29 PROCEDURE — 36415 COLL VENOUS BLD VENIPUNCTURE: CPT

## 2018-05-29 RX ADMIN — IPRATROPIUM BROMIDE AND ALBUTEROL SULFATE 3 ML: .5; 3 SOLUTION RESPIRATORY (INHALATION) at 17:12

## 2018-05-29 RX ADMIN — IPRATROPIUM BROMIDE AND ALBUTEROL SULFATE 3 ML: .5; 3 SOLUTION RESPIRATORY (INHALATION) at 00:09

## 2018-05-29 RX ADMIN — GABAPENTIN 400 MG: 400 CAPSULE ORAL at 14:18

## 2018-05-29 RX ADMIN — MONTELUKAST SODIUM 10 MG: 10 TABLET, FILM COATED ORAL at 21:29

## 2018-05-29 RX ADMIN — GABAPENTIN 400 MG: 400 CAPSULE ORAL at 21:28

## 2018-05-29 RX ADMIN — METOPROLOL TARTRATE 100 MG: 50 TABLET ORAL at 09:04

## 2018-05-29 RX ADMIN — IPRATROPIUM BROMIDE AND ALBUTEROL SULFATE 3 ML: .5; 3 SOLUTION RESPIRATORY (INHALATION) at 09:40

## 2018-05-29 RX ADMIN — IPRATROPIUM BROMIDE AND ALBUTEROL SULFATE 3 ML: .5; 3 SOLUTION RESPIRATORY (INHALATION) at 04:05

## 2018-05-29 RX ADMIN — INSULIN LISPRO 2 UNITS: 100 INJECTION, SOLUTION INTRAVENOUS; SUBCUTANEOUS at 16:50

## 2018-05-29 RX ADMIN — FENOFIBRATE 160 MG: 160 TABLET ORAL at 09:05

## 2018-05-29 RX ADMIN — DILTIAZEM HYDROCHLORIDE 240 MG: 240 CAPSULE, COATED, EXTENDED RELEASE ORAL at 21:28

## 2018-05-29 RX ADMIN — DILTIAZEM HYDROCHLORIDE 240 MG: 240 CAPSULE, COATED, EXTENDED RELEASE ORAL at 09:04

## 2018-05-29 RX ADMIN — GABAPENTIN 400 MG: 400 CAPSULE ORAL at 09:05

## 2018-05-29 RX ADMIN — OXYCODONE AND ACETAMINOPHEN 1 TABLET: 10; 325 TABLET ORAL at 12:26

## 2018-05-29 RX ADMIN — ESCITALOPRAM OXALATE 20 MG: 10 TABLET, FILM COATED ORAL at 09:05

## 2018-05-29 RX ADMIN — FERROUS SULFATE TAB 325 MG (65 MG ELEMENTAL FE) 325 MG: 325 (65 FE) TAB at 09:05

## 2018-05-29 RX ADMIN — OXYCODONE AND ACETAMINOPHEN 1 TABLET: 10; 325 TABLET ORAL at 04:30

## 2018-05-29 RX ADMIN — METOPROLOL TARTRATE 100 MG: 50 TABLET ORAL at 21:28

## 2018-05-29 RX ADMIN — MICONAZOLE NITRATE: 20 POWDER TOPICAL at 09:05

## 2018-05-29 RX ADMIN — FLUTICASONE PROPIONATE 2 SPRAY: 50 SPRAY, METERED NASAL at 09:06

## 2018-05-29 RX ADMIN — CETIRIZINE HYDROCHLORIDE 5 MG: 10 TABLET, FILM COATED ORAL at 09:04

## 2018-05-29 RX ADMIN — MICONAZOLE NITRATE: 20 POWDER TOPICAL at 21:34

## 2018-05-29 RX ADMIN — Medication 10 ML: at 21:29

## 2018-05-29 RX ADMIN — PANTOPRAZOLE SODIUM 40 MG: 40 TABLET, DELAYED RELEASE ORAL at 05:15

## 2018-05-29 RX ADMIN — ASPIRIN 81 MG: 81 TABLET, COATED ORAL at 09:05

## 2018-05-29 RX ADMIN — INSULIN LISPRO 2 UNITS: 100 INJECTION, SOLUTION INTRAVENOUS; SUBCUTANEOUS at 21:30

## 2018-05-29 RX ADMIN — Medication 10 ML: at 09:06

## 2018-05-29 RX ADMIN — MOMETASONE FUROATE AND FORMOTEROL FUMARATE DIHYDRATE 2 PUFF: 200; 5 AEROSOL RESPIRATORY (INHALATION) at 09:41

## 2018-05-29 RX ADMIN — SODIUM CHLORIDE: 9 INJECTION, SOLUTION INTRAVENOUS at 05:14

## 2018-05-29 RX ADMIN — INSULIN GLARGINE 110 UNITS: 100 INJECTION, SOLUTION SUBCUTANEOUS at 21:29

## 2018-05-29 RX ADMIN — OXYCODONE AND ACETAMINOPHEN 1 TABLET: 10; 325 TABLET ORAL at 17:19

## 2018-05-29 ASSESSMENT — PAIN SCALES - GENERAL
PAINLEVEL_OUTOF10: 0
PAINLEVEL_OUTOF10: 0
PAINLEVEL_OUTOF10: 6
PAINLEVEL_OUTOF10: 6
PAINLEVEL_OUTOF10: 1
PAINLEVEL_OUTOF10: 6
PAINLEVEL_OUTOF10: 3
PAINLEVEL_OUTOF10: 2
PAINLEVEL_OUTOF10: 0
PAINLEVEL_OUTOF10: 0

## 2018-05-29 ASSESSMENT — PAIN DESCRIPTION - FREQUENCY
FREQUENCY: INTERMITTENT
FREQUENCY: INTERMITTENT

## 2018-05-29 ASSESSMENT — PAIN DESCRIPTION - DESCRIPTORS
DESCRIPTORS: ACHING
DESCRIPTORS: THROBBING

## 2018-05-29 ASSESSMENT — PAIN DESCRIPTION - PROGRESSION: CLINICAL_PROGRESSION: NOT CHANGED

## 2018-05-29 ASSESSMENT — PAIN DESCRIPTION - ONSET
ONSET: SUDDEN
ONSET: GRADUAL

## 2018-05-29 ASSESSMENT — PAIN DESCRIPTION - LOCATION
LOCATION: BACK;LEG
LOCATION: ARM;BACK
LOCATION: FOOT

## 2018-05-29 ASSESSMENT — PAIN DESCRIPTION - ORIENTATION
ORIENTATION: MID;LOWER
ORIENTATION: OTHER (COMMENT)
ORIENTATION: RIGHT

## 2018-05-29 ASSESSMENT — PAIN DESCRIPTION - PAIN TYPE
TYPE: ACUTE PAIN
TYPE: CHRONIC PAIN
TYPE: CHRONIC PAIN

## 2018-05-30 LAB
ALBUMIN SERPL-MCNC: 3.8 G/DL (ref 3.5–5.2)
ALP BLD-CCNC: 43 U/L (ref 35–104)
ALT SERPL-CCNC: 14 U/L (ref 0–32)
ANION GAP SERPL CALCULATED.3IONS-SCNC: 8 MMOL/L (ref 7–16)
AST SERPL-CCNC: 16 U/L (ref 0–31)
BASOPHILS ABSOLUTE: 0.03 E9/L (ref 0–0.2)
BASOPHILS RELATIVE PERCENT: 0.5 % (ref 0–2)
BILIRUB SERPL-MCNC: 0.2 MG/DL (ref 0–1.2)
BILIRUBIN DIRECT: <0.2 MG/DL (ref 0–0.3)
BILIRUBIN, INDIRECT: ABNORMAL MG/DL (ref 0–1)
BUN BLDV-MCNC: 34 MG/DL (ref 8–23)
CALCIUM SERPL-MCNC: 9.1 MG/DL (ref 8.6–10.2)
CHLORIDE BLD-SCNC: 101 MMOL/L (ref 98–107)
CO2: 31 MMOL/L (ref 22–29)
CREAT SERPL-MCNC: 1 MG/DL (ref 0.5–1)
EOSINOPHILS ABSOLUTE: 0.23 E9/L (ref 0.05–0.5)
EOSINOPHILS RELATIVE PERCENT: 3.7 % (ref 0–6)
GFR AFRICAN AMERICAN: >60
GFR NON-AFRICAN AMERICAN: 56 ML/MIN/1.73
GLUCOSE BLD-MCNC: 111 MG/DL (ref 74–109)
HCT VFR BLD CALC: 26.8 % (ref 34–48)
HEMOGLOBIN: 8.1 G/DL (ref 11.5–15.5)
IMMATURE GRANULOCYTES #: 0.01 E9/L
IMMATURE GRANULOCYTES %: 0.2 % (ref 0–5)
LYMPHOCYTES ABSOLUTE: 2.03 E9/L (ref 1.5–4)
LYMPHOCYTES RELATIVE PERCENT: 32.5 % (ref 20–42)
MAGNESIUM: 1.9 MG/DL (ref 1.6–2.6)
MCH RBC QN AUTO: 26.6 PG (ref 26–35)
MCHC RBC AUTO-ENTMCNC: 30.2 % (ref 32–34.5)
MCV RBC AUTO: 88.2 FL (ref 80–99.9)
METER GLUCOSE: 119 MG/DL (ref 70–110)
METER GLUCOSE: 208 MG/DL (ref 70–110)
METER GLUCOSE: 241 MG/DL (ref 70–110)
METER GLUCOSE: 71 MG/DL (ref 70–110)
MONOCYTES ABSOLUTE: 0.54 E9/L (ref 0.1–0.95)
MONOCYTES RELATIVE PERCENT: 8.7 % (ref 2–12)
NEUTROPHILS ABSOLUTE: 3.4 E9/L (ref 1.8–7.3)
NEUTROPHILS RELATIVE PERCENT: 54.4 % (ref 43–80)
PDW BLD-RTO: 15.3 FL (ref 11.5–15)
PHOSPHORUS: 3.2 MG/DL (ref 2.5–4.5)
PLATELET # BLD: 207 E9/L (ref 130–450)
PMV BLD AUTO: 9.9 FL (ref 7–12)
POTASSIUM SERPL-SCNC: 4.5 MMOL/L (ref 3.5–5)
RBC # BLD: 3.04 E12/L (ref 3.5–5.5)
SODIUM BLD-SCNC: 140 MMOL/L (ref 132–146)
TOTAL PROTEIN: 6 G/DL (ref 6.4–8.3)
WBC # BLD: 6.2 E9/L (ref 4.5–11.5)

## 2018-05-30 PROCEDURE — 94640 AIRWAY INHALATION TREATMENT: CPT

## 2018-05-30 PROCEDURE — 6370000000 HC RX 637 (ALT 250 FOR IP): Performed by: NURSE PRACTITIONER

## 2018-05-30 PROCEDURE — 85025 COMPLETE CBC W/AUTO DIFF WBC: CPT

## 2018-05-30 PROCEDURE — 36415 COLL VENOUS BLD VENIPUNCTURE: CPT

## 2018-05-30 PROCEDURE — 97530 THERAPEUTIC ACTIVITIES: CPT

## 2018-05-30 PROCEDURE — 80076 HEPATIC FUNCTION PANEL: CPT

## 2018-05-30 PROCEDURE — 94760 N-INVAS EAR/PLS OXIMETRY 1: CPT

## 2018-05-30 PROCEDURE — 82962 GLUCOSE BLOOD TEST: CPT

## 2018-05-30 PROCEDURE — 84100 ASSAY OF PHOSPHORUS: CPT

## 2018-05-30 PROCEDURE — 83735 ASSAY OF MAGNESIUM: CPT

## 2018-05-30 PROCEDURE — 2580000003 HC RX 258: Performed by: INTERNAL MEDICINE

## 2018-05-30 PROCEDURE — 2060000000 HC ICU INTERMEDIATE R&B

## 2018-05-30 PROCEDURE — 80048 BASIC METABOLIC PNL TOTAL CA: CPT

## 2018-05-30 RX ADMIN — DILTIAZEM HYDROCHLORIDE 240 MG: 240 CAPSULE, COATED, EXTENDED RELEASE ORAL at 20:30

## 2018-05-30 RX ADMIN — MICONAZOLE NITRATE: 20 POWDER TOPICAL at 20:30

## 2018-05-30 RX ADMIN — DILTIAZEM HYDROCHLORIDE 240 MG: 240 CAPSULE, COATED, EXTENDED RELEASE ORAL at 09:13

## 2018-05-30 RX ADMIN — MICONAZOLE NITRATE: 20 POWDER TOPICAL at 09:14

## 2018-05-30 RX ADMIN — OXYCODONE AND ACETAMINOPHEN 1 TABLET: 10; 325 TABLET ORAL at 10:17

## 2018-05-30 RX ADMIN — GABAPENTIN 400 MG: 400 CAPSULE ORAL at 09:13

## 2018-05-30 RX ADMIN — IPRATROPIUM BROMIDE AND ALBUTEROL SULFATE 3 ML: .5; 3 SOLUTION RESPIRATORY (INHALATION) at 12:59

## 2018-05-30 RX ADMIN — IPRATROPIUM BROMIDE AND ALBUTEROL SULFATE 3 ML: .5; 3 SOLUTION RESPIRATORY (INHALATION) at 08:37

## 2018-05-30 RX ADMIN — OXYCODONE AND ACETAMINOPHEN 1 TABLET: 10; 325 TABLET ORAL at 05:47

## 2018-05-30 RX ADMIN — ASPIRIN 81 MG: 81 TABLET, COATED ORAL at 09:13

## 2018-05-30 RX ADMIN — IPRATROPIUM BROMIDE AND ALBUTEROL SULFATE 3 ML: .5; 3 SOLUTION RESPIRATORY (INHALATION) at 17:10

## 2018-05-30 RX ADMIN — IPRATROPIUM BROMIDE AND ALBUTEROL SULFATE 3 ML: .5; 3 SOLUTION RESPIRATORY (INHALATION) at 01:25

## 2018-05-30 RX ADMIN — OXYCODONE AND ACETAMINOPHEN 1 TABLET: 10; 325 TABLET ORAL at 20:30

## 2018-05-30 RX ADMIN — Medication 10 ML: at 09:14

## 2018-05-30 RX ADMIN — INSULIN LISPRO 4 UNITS: 100 INJECTION, SOLUTION INTRAVENOUS; SUBCUTANEOUS at 16:40

## 2018-05-30 RX ADMIN — MOMETASONE FUROATE AND FORMOTEROL FUMARATE DIHYDRATE 2 PUFF: 200; 5 AEROSOL RESPIRATORY (INHALATION) at 20:26

## 2018-05-30 RX ADMIN — METOPROLOL TARTRATE 100 MG: 50 TABLET ORAL at 09:13

## 2018-05-30 RX ADMIN — ESCITALOPRAM OXALATE 20 MG: 10 TABLET, FILM COATED ORAL at 09:13

## 2018-05-30 RX ADMIN — PANTOPRAZOLE SODIUM 40 MG: 40 TABLET, DELAYED RELEASE ORAL at 05:47

## 2018-05-30 RX ADMIN — CETIRIZINE HYDROCHLORIDE 5 MG: 10 TABLET, FILM COATED ORAL at 09:13

## 2018-05-30 RX ADMIN — MOMETASONE FUROATE AND FORMOTEROL FUMARATE DIHYDRATE 2 PUFF: 200; 5 AEROSOL RESPIRATORY (INHALATION) at 08:37

## 2018-05-30 RX ADMIN — MONTELUKAST SODIUM 10 MG: 10 TABLET, FILM COATED ORAL at 20:30

## 2018-05-30 RX ADMIN — INSULIN GLARGINE 110 UNITS: 100 INJECTION, SOLUTION SUBCUTANEOUS at 20:30

## 2018-05-30 RX ADMIN — FENOFIBRATE 160 MG: 160 TABLET ORAL at 09:13

## 2018-05-30 RX ADMIN — GABAPENTIN 400 MG: 400 CAPSULE ORAL at 20:30

## 2018-05-30 RX ADMIN — OXYCODONE AND ACETAMINOPHEN 1 TABLET: 10; 325 TABLET ORAL at 00:00

## 2018-05-30 RX ADMIN — METOPROLOL TARTRATE 100 MG: 50 TABLET ORAL at 20:30

## 2018-05-30 RX ADMIN — GABAPENTIN 400 MG: 400 CAPSULE ORAL at 14:11

## 2018-05-30 RX ADMIN — FERROUS SULFATE TAB 325 MG (65 MG ELEMENTAL FE) 325 MG: 325 (65 FE) TAB at 09:13

## 2018-05-30 RX ADMIN — INSULIN LISPRO 2 UNITS: 100 INJECTION, SOLUTION INTRAVENOUS; SUBCUTANEOUS at 20:30

## 2018-05-30 RX ADMIN — IPRATROPIUM BROMIDE AND ALBUTEROL SULFATE 3 ML: .5; 3 SOLUTION RESPIRATORY (INHALATION) at 20:26

## 2018-05-30 RX ADMIN — Medication 10 ML: at 20:30

## 2018-05-30 RX ADMIN — FLUTICASONE PROPIONATE 2 SPRAY: 50 SPRAY, METERED NASAL at 09:14

## 2018-05-30 RX ADMIN — ALPRAZOLAM 0.5 MG: 0.25 TABLET ORAL at 00:00

## 2018-05-30 ASSESSMENT — PAIN DESCRIPTION - LOCATION
LOCATION: FOOT;LEG
LOCATION: NECK
LOCATION: FOOT;HEAD
LOCATION: HEAD;BACK

## 2018-05-30 ASSESSMENT — PAIN DESCRIPTION - ORIENTATION
ORIENTATION: RIGHT;LEFT
ORIENTATION: MID
ORIENTATION: RIGHT;LEFT
ORIENTATION: MID;LOWER

## 2018-05-30 ASSESSMENT — PAIN DESCRIPTION - FREQUENCY
FREQUENCY: INTERMITTENT

## 2018-05-30 ASSESSMENT — PAIN SCALES - GENERAL
PAINLEVEL_OUTOF10: 1
PAINLEVEL_OUTOF10: 5
PAINLEVEL_OUTOF10: 6
PAINLEVEL_OUTOF10: 3
PAINLEVEL_OUTOF10: 4
PAINLEVEL_OUTOF10: 2
PAINLEVEL_OUTOF10: 5

## 2018-05-30 ASSESSMENT — PAIN DESCRIPTION - DESCRIPTORS
DESCRIPTORS: THROBBING
DESCRIPTORS: ACHING;DISCOMFORT;HEADACHE
DESCRIPTORS: ACHING
DESCRIPTORS: CONSTANT;DISCOMFORT;THROBBING

## 2018-05-30 ASSESSMENT — PAIN DESCRIPTION - ONSET
ONSET: ON-GOING
ONSET: SUDDEN
ONSET: ON-GOING

## 2018-05-30 ASSESSMENT — PAIN DESCRIPTION - PAIN TYPE
TYPE: CHRONIC PAIN

## 2018-05-30 ASSESSMENT — PAIN DESCRIPTION - PROGRESSION: CLINICAL_PROGRESSION: GRADUALLY WORSENING

## 2018-05-30 ASSESSMENT — PAIN DESCRIPTION - DIRECTION: RADIATING_TOWARDS: BACK

## 2018-05-31 ENCOUNTER — ANESTHESIA EVENT (OUTPATIENT)
Dept: ENDOSCOPY | Age: 63
DRG: 469 | End: 2018-05-31
Payer: COMMERCIAL

## 2018-05-31 LAB
ALBUMIN SERPL-MCNC: 3.9 G/DL (ref 3.5–5.2)
ALP BLD-CCNC: 48 U/L (ref 35–104)
ALT SERPL-CCNC: 15 U/L (ref 0–32)
ANION GAP SERPL CALCULATED.3IONS-SCNC: 10 MMOL/L (ref 7–16)
AST SERPL-CCNC: 15 U/L (ref 0–31)
BASOPHILS ABSOLUTE: 0.02 E9/L (ref 0–0.2)
BASOPHILS RELATIVE PERCENT: 0.3 % (ref 0–2)
BILIRUB SERPL-MCNC: 0.2 MG/DL (ref 0–1.2)
BILIRUBIN DIRECT: <0.2 MG/DL (ref 0–0.3)
BILIRUBIN, INDIRECT: ABNORMAL MG/DL (ref 0–1)
BUN BLDV-MCNC: 25 MG/DL (ref 8–23)
CALCIUM SERPL-MCNC: 9.2 MG/DL (ref 8.6–10.2)
CHLORIDE BLD-SCNC: 98 MMOL/L (ref 98–107)
CO2: 32 MMOL/L (ref 22–29)
CREAT SERPL-MCNC: 0.9 MG/DL (ref 0.5–1)
EOSINOPHILS ABSOLUTE: 0.22 E9/L (ref 0.05–0.5)
EOSINOPHILS RELATIVE PERCENT: 3.3 % (ref 0–6)
GFR AFRICAN AMERICAN: >60
GFR NON-AFRICAN AMERICAN: >60 ML/MIN/1.73
GLUCOSE BLD-MCNC: 179 MG/DL (ref 74–109)
HCT VFR BLD CALC: 28.3 % (ref 34–48)
HEMOGLOBIN: 8.7 G/DL (ref 11.5–15.5)
IMMATURE GRANULOCYTES #: 0.05 E9/L
IMMATURE GRANULOCYTES %: 0.7 % (ref 0–5)
LYMPHOCYTES ABSOLUTE: 1.31 E9/L (ref 1.5–4)
LYMPHOCYTES RELATIVE PERCENT: 19.6 % (ref 20–42)
MCH RBC QN AUTO: 27 PG (ref 26–35)
MCHC RBC AUTO-ENTMCNC: 30.7 % (ref 32–34.5)
MCV RBC AUTO: 87.9 FL (ref 80–99.9)
METER GLUCOSE: 117 MG/DL (ref 70–110)
METER GLUCOSE: 164 MG/DL (ref 70–110)
METER GLUCOSE: 183 MG/DL (ref 70–110)
METER GLUCOSE: 193 MG/DL (ref 70–110)
MONOCYTES ABSOLUTE: 0.44 E9/L (ref 0.1–0.95)
MONOCYTES RELATIVE PERCENT: 6.6 % (ref 2–12)
NEUTROPHILS ABSOLUTE: 4.66 E9/L (ref 1.8–7.3)
NEUTROPHILS RELATIVE PERCENT: 69.5 % (ref 43–80)
PDW BLD-RTO: 15 FL (ref 11.5–15)
PLATELET # BLD: 221 E9/L (ref 130–450)
PMV BLD AUTO: 10.2 FL (ref 7–12)
POTASSIUM SERPL-SCNC: 4.3 MMOL/L (ref 3.5–5)
RBC # BLD: 3.22 E12/L (ref 3.5–5.5)
SODIUM BLD-SCNC: 140 MMOL/L (ref 132–146)
TOTAL PROTEIN: 6.1 G/DL (ref 6.4–8.3)
WBC # BLD: 6.7 E9/L (ref 4.5–11.5)

## 2018-05-31 PROCEDURE — 94640 AIRWAY INHALATION TREATMENT: CPT

## 2018-05-31 PROCEDURE — 94760 N-INVAS EAR/PLS OXIMETRY 1: CPT

## 2018-05-31 PROCEDURE — 80048 BASIC METABOLIC PNL TOTAL CA: CPT

## 2018-05-31 PROCEDURE — 36415 COLL VENOUS BLD VENIPUNCTURE: CPT

## 2018-05-31 PROCEDURE — 97530 THERAPEUTIC ACTIVITIES: CPT

## 2018-05-31 PROCEDURE — 2580000003 HC RX 258: Performed by: INTERNAL MEDICINE

## 2018-05-31 PROCEDURE — 85025 COMPLETE CBC W/AUTO DIFF WBC: CPT

## 2018-05-31 PROCEDURE — 2060000000 HC ICU INTERMEDIATE R&B

## 2018-05-31 PROCEDURE — 82962 GLUCOSE BLOOD TEST: CPT

## 2018-05-31 PROCEDURE — 6370000000 HC RX 637 (ALT 250 FOR IP): Performed by: NURSE PRACTITIONER

## 2018-05-31 PROCEDURE — 80076 HEPATIC FUNCTION PANEL: CPT

## 2018-05-31 RX ORDER — SODIUM CHLORIDE 9 MG/ML
INJECTION, SOLUTION INTRAVENOUS CONTINUOUS
Status: DISCONTINUED | OUTPATIENT
Start: 2018-05-31 | End: 2018-06-01

## 2018-05-31 RX ADMIN — OXYCODONE AND ACETAMINOPHEN 1 TABLET: 10; 325 TABLET ORAL at 13:47

## 2018-05-31 RX ADMIN — IPRATROPIUM BROMIDE AND ALBUTEROL SULFATE 3 ML: .5; 3 SOLUTION RESPIRATORY (INHALATION) at 11:59

## 2018-05-31 RX ADMIN — MICONAZOLE NITRATE: 20 POWDER TOPICAL at 20:13

## 2018-05-31 RX ADMIN — CETIRIZINE HYDROCHLORIDE 5 MG: 10 TABLET, FILM COATED ORAL at 09:29

## 2018-05-31 RX ADMIN — Medication 10 ML: at 20:16

## 2018-05-31 RX ADMIN — IPRATROPIUM BROMIDE AND ALBUTEROL SULFATE 3 ML: .5; 3 SOLUTION RESPIRATORY (INHALATION) at 16:10

## 2018-05-31 RX ADMIN — ESCITALOPRAM OXALATE 20 MG: 10 TABLET, FILM COATED ORAL at 09:29

## 2018-05-31 RX ADMIN — OXYCODONE AND ACETAMINOPHEN 1 TABLET: 10; 325 TABLET ORAL at 00:37

## 2018-05-31 RX ADMIN — FERROUS SULFATE TAB 325 MG (65 MG ELEMENTAL FE) 325 MG: 325 (65 FE) TAB at 09:29

## 2018-05-31 RX ADMIN — IPRATROPIUM BROMIDE AND ALBUTEROL SULFATE 3 ML: .5; 3 SOLUTION RESPIRATORY (INHALATION) at 08:36

## 2018-05-31 RX ADMIN — ALPRAZOLAM 0.5 MG: 0.25 TABLET ORAL at 22:12

## 2018-05-31 RX ADMIN — GABAPENTIN 400 MG: 400 CAPSULE ORAL at 20:13

## 2018-05-31 RX ADMIN — IPRATROPIUM BROMIDE AND ALBUTEROL SULFATE 3 ML: .5; 3 SOLUTION RESPIRATORY (INHALATION) at 04:37

## 2018-05-31 RX ADMIN — INSULIN LISPRO 1 UNITS: 100 INJECTION, SOLUTION INTRAVENOUS; SUBCUTANEOUS at 20:12

## 2018-05-31 RX ADMIN — ALPRAZOLAM 0.5 MG: 0.25 TABLET ORAL at 00:37

## 2018-05-31 RX ADMIN — INSULIN GLARGINE 50 UNITS: 100 INJECTION, SOLUTION SUBCUTANEOUS at 20:11

## 2018-05-31 RX ADMIN — FLUTICASONE PROPIONATE 2 SPRAY: 50 SPRAY, METERED NASAL at 09:30

## 2018-05-31 RX ADMIN — METOPROLOL TARTRATE 100 MG: 50 TABLET ORAL at 20:13

## 2018-05-31 RX ADMIN — PANTOPRAZOLE SODIUM 40 MG: 40 TABLET, DELAYED RELEASE ORAL at 06:44

## 2018-05-31 RX ADMIN — Medication 10 ML: at 11:21

## 2018-05-31 RX ADMIN — OXYCODONE AND ACETAMINOPHEN 1 TABLET: 10; 325 TABLET ORAL at 17:47

## 2018-05-31 RX ADMIN — MONTELUKAST SODIUM 10 MG: 10 TABLET, FILM COATED ORAL at 20:13

## 2018-05-31 RX ADMIN — INSULIN LISPRO 2 UNITS: 100 INJECTION, SOLUTION INTRAVENOUS; SUBCUTANEOUS at 17:44

## 2018-05-31 RX ADMIN — IPRATROPIUM BROMIDE AND ALBUTEROL SULFATE 3 ML: .5; 3 SOLUTION RESPIRATORY (INHALATION) at 19:58

## 2018-05-31 RX ADMIN — IPRATROPIUM BROMIDE AND ALBUTEROL SULFATE 3 ML: .5; 3 SOLUTION RESPIRATORY (INHALATION) at 00:47

## 2018-05-31 RX ADMIN — GABAPENTIN 400 MG: 400 CAPSULE ORAL at 09:29

## 2018-05-31 RX ADMIN — DILTIAZEM HYDROCHLORIDE 240 MG: 240 CAPSULE, COATED, EXTENDED RELEASE ORAL at 20:13

## 2018-05-31 RX ADMIN — OXYCODONE AND ACETAMINOPHEN 1 TABLET: 10; 325 TABLET ORAL at 22:12

## 2018-05-31 RX ADMIN — METOPROLOL TARTRATE 100 MG: 50 TABLET ORAL at 09:30

## 2018-05-31 RX ADMIN — DILTIAZEM HYDROCHLORIDE 240 MG: 240 CAPSULE, COATED, EXTENDED RELEASE ORAL at 09:29

## 2018-05-31 RX ADMIN — Medication 10 ML: at 09:29

## 2018-05-31 RX ADMIN — MICONAZOLE NITRATE: 20 POWDER TOPICAL at 09:30

## 2018-05-31 RX ADMIN — INSULIN LISPRO 2 UNITS: 100 INJECTION, SOLUTION INTRAVENOUS; SUBCUTANEOUS at 12:20

## 2018-05-31 RX ADMIN — MOMETASONE FUROATE AND FORMOTEROL FUMARATE DIHYDRATE 2 PUFF: 200; 5 AEROSOL RESPIRATORY (INHALATION) at 08:36

## 2018-05-31 RX ADMIN — FENOFIBRATE 160 MG: 160 TABLET ORAL at 09:29

## 2018-05-31 RX ADMIN — MOMETASONE FUROATE AND FORMOTEROL FUMARATE DIHYDRATE 2 PUFF: 200; 5 AEROSOL RESPIRATORY (INHALATION) at 19:58

## 2018-05-31 RX ADMIN — Medication 20 ML: at 17:38

## 2018-05-31 ASSESSMENT — PAIN SCALES - GENERAL
PAINLEVEL_OUTOF10: 5
PAINLEVEL_OUTOF10: 5
PAINLEVEL_OUTOF10: 4
PAINLEVEL_OUTOF10: 0
PAINLEVEL_OUTOF10: 1
PAINLEVEL_OUTOF10: 6

## 2018-05-31 ASSESSMENT — PAIN DESCRIPTION - LOCATION: LOCATION: FOOT;HEAD

## 2018-05-31 ASSESSMENT — ENCOUNTER SYMPTOMS: SHORTNESS OF BREATH: 1

## 2018-05-31 ASSESSMENT — PAIN DESCRIPTION - ONSET: ONSET: ON-GOING

## 2018-05-31 ASSESSMENT — PAIN DESCRIPTION - FREQUENCY: FREQUENCY: INTERMITTENT

## 2018-05-31 ASSESSMENT — PAIN DESCRIPTION - ORIENTATION: ORIENTATION: RIGHT;LEFT

## 2018-05-31 ASSESSMENT — PAIN DESCRIPTION - DESCRIPTORS: DESCRIPTORS: ACHING;DISCOMFORT;HEADACHE

## 2018-05-31 ASSESSMENT — PAIN DESCRIPTION - PROGRESSION: CLINICAL_PROGRESSION: GRADUALLY WORSENING

## 2018-05-31 ASSESSMENT — PAIN DESCRIPTION - PAIN TYPE: TYPE: CHRONIC PAIN

## 2018-06-01 ENCOUNTER — ANESTHESIA (OUTPATIENT)
Dept: ENDOSCOPY | Age: 63
DRG: 469 | End: 2018-06-01
Payer: COMMERCIAL

## 2018-06-01 VITALS
SYSTOLIC BLOOD PRESSURE: 148 MMHG | RESPIRATION RATE: 17 BRPM | OXYGEN SATURATION: 96 % | DIASTOLIC BLOOD PRESSURE: 71 MMHG

## 2018-06-01 LAB
ALBUMIN SERPL-MCNC: 4 G/DL (ref 3.5–5.2)
ALP BLD-CCNC: 49 U/L (ref 35–104)
ALT SERPL-CCNC: 17 U/L (ref 0–32)
ANION GAP SERPL CALCULATED.3IONS-SCNC: 8 MMOL/L (ref 7–16)
AST SERPL-CCNC: 17 U/L (ref 0–31)
BASOPHILS ABSOLUTE: 0.03 E9/L (ref 0–0.2)
BASOPHILS RELATIVE PERCENT: 0.5 % (ref 0–2)
BILIRUB SERPL-MCNC: 0.2 MG/DL (ref 0–1.2)
BILIRUBIN DIRECT: <0.2 MG/DL (ref 0–0.3)
BILIRUBIN, INDIRECT: ABNORMAL MG/DL (ref 0–1)
BUN BLDV-MCNC: 24 MG/DL (ref 8–23)
CALCIUM SERPL-MCNC: 9.2 MG/DL (ref 8.6–10.2)
CHLORIDE BLD-SCNC: 98 MMOL/L (ref 98–107)
CO2: 31 MMOL/L (ref 22–29)
CREAT SERPL-MCNC: 1 MG/DL (ref 0.5–1)
EOSINOPHILS ABSOLUTE: 0.19 E9/L (ref 0.05–0.5)
EOSINOPHILS RELATIVE PERCENT: 3 % (ref 0–6)
GFR AFRICAN AMERICAN: >60
GFR NON-AFRICAN AMERICAN: 56 ML/MIN/1.73
GLUCOSE BLD-MCNC: 187 MG/DL (ref 74–109)
HCT VFR BLD CALC: 26.5 % (ref 34–48)
HEMOGLOBIN: 8.1 G/DL (ref 11.5–15.5)
IMMATURE GRANULOCYTES #: 0.03 E9/L
IMMATURE GRANULOCYTES %: 0.5 % (ref 0–5)
INR BLD: 1.1
LYMPHOCYTES ABSOLUTE: 1.68 E9/L (ref 1.5–4)
LYMPHOCYTES RELATIVE PERCENT: 26.5 % (ref 20–42)
MCH RBC QN AUTO: 26.8 PG (ref 26–35)
MCHC RBC AUTO-ENTMCNC: 30.6 % (ref 32–34.5)
MCV RBC AUTO: 87.7 FL (ref 80–99.9)
METER GLUCOSE: 132 MG/DL (ref 70–110)
METER GLUCOSE: 146 MG/DL (ref 70–110)
METER GLUCOSE: 149 MG/DL (ref 70–110)
METER GLUCOSE: 222 MG/DL (ref 70–110)
MONOCYTES ABSOLUTE: 0.4 E9/L (ref 0.1–0.95)
MONOCYTES RELATIVE PERCENT: 6.3 % (ref 2–12)
NEUTROPHILS ABSOLUTE: 4 E9/L (ref 1.8–7.3)
NEUTROPHILS RELATIVE PERCENT: 63.2 % (ref 43–80)
PDW BLD-RTO: 15 FL (ref 11.5–15)
PLATELET # BLD: 227 E9/L (ref 130–450)
PMV BLD AUTO: 10 FL (ref 7–12)
POTASSIUM SERPL-SCNC: 4.6 MMOL/L (ref 3.5–5)
PROTHROMBIN TIME: 12.9 SEC (ref 9.3–12.4)
RBC # BLD: 3.02 E12/L (ref 3.5–5.5)
SODIUM BLD-SCNC: 137 MMOL/L (ref 132–146)
TOTAL PROTEIN: 6.1 G/DL (ref 6.4–8.3)
WBC # BLD: 6.3 E9/L (ref 4.5–11.5)

## 2018-06-01 PROCEDURE — 3700000001 HC ADD 15 MINUTES (ANESTHESIA): Performed by: INTERNAL MEDICINE

## 2018-06-01 PROCEDURE — 3609012400 HC EGD TRANSORAL BIOPSY SINGLE/MULTIPLE: Performed by: INTERNAL MEDICINE

## 2018-06-01 PROCEDURE — 2580000003 HC RX 258: Performed by: NURSE ANESTHETIST, CERTIFIED REGISTERED

## 2018-06-01 PROCEDURE — 6370000000 HC RX 637 (ALT 250 FOR IP): Performed by: NURSE PRACTITIONER

## 2018-06-01 PROCEDURE — 0DB68ZX EXCISION OF STOMACH, VIA NATURAL OR ARTIFICIAL OPENING ENDOSCOPIC, DIAGNOSTIC: ICD-10-PCS | Performed by: FAMILY MEDICINE

## 2018-06-01 PROCEDURE — 36415 COLL VENOUS BLD VENIPUNCTURE: CPT

## 2018-06-01 PROCEDURE — 88305 TISSUE EXAM BY PATHOLOGIST: CPT

## 2018-06-01 PROCEDURE — 3700000000 HC ANESTHESIA ATTENDED CARE: Performed by: INTERNAL MEDICINE

## 2018-06-01 PROCEDURE — 85025 COMPLETE CBC W/AUTO DIFF WBC: CPT

## 2018-06-01 PROCEDURE — 0DB98ZX EXCISION OF DUODENUM, VIA NATURAL OR ARTIFICIAL OPENING ENDOSCOPIC, DIAGNOSTIC: ICD-10-PCS | Performed by: FAMILY MEDICINE

## 2018-06-01 PROCEDURE — 2580000003 HC RX 258: Performed by: INTERNAL MEDICINE

## 2018-06-01 PROCEDURE — 85610 PROTHROMBIN TIME: CPT

## 2018-06-01 PROCEDURE — 82962 GLUCOSE BLOOD TEST: CPT

## 2018-06-01 PROCEDURE — 7100000000 HC PACU RECOVERY - FIRST 15 MIN: Performed by: INTERNAL MEDICINE

## 2018-06-01 PROCEDURE — 6360000002 HC RX W HCPCS: Performed by: NURSE ANESTHETIST, CERTIFIED REGISTERED

## 2018-06-01 PROCEDURE — 94640 AIRWAY INHALATION TREATMENT: CPT

## 2018-06-01 PROCEDURE — 87081 CULTURE SCREEN ONLY: CPT

## 2018-06-01 PROCEDURE — 2060000000 HC ICU INTERMEDIATE R&B

## 2018-06-01 PROCEDURE — 80076 HEPATIC FUNCTION PANEL: CPT

## 2018-06-01 PROCEDURE — 80048 BASIC METABOLIC PNL TOTAL CA: CPT

## 2018-06-01 PROCEDURE — 7100000001 HC PACU RECOVERY - ADDTL 15 MIN: Performed by: INTERNAL MEDICINE

## 2018-06-01 RX ORDER — PROPOFOL 10 MG/ML
INJECTION, EMULSION INTRAVENOUS PRN
Status: DISCONTINUED | OUTPATIENT
Start: 2018-06-01 | End: 2018-06-01 | Stop reason: SDUPTHER

## 2018-06-01 RX ORDER — SODIUM CHLORIDE 9 MG/ML
INJECTION, SOLUTION INTRAVENOUS CONTINUOUS PRN
Status: DISCONTINUED | OUTPATIENT
Start: 2018-06-01 | End: 2018-06-01 | Stop reason: SDUPTHER

## 2018-06-01 RX ADMIN — OXYCODONE AND ACETAMINOPHEN 1 TABLET: 10; 325 TABLET ORAL at 16:58

## 2018-06-01 RX ADMIN — IPRATROPIUM BROMIDE AND ALBUTEROL SULFATE 3 ML: .5; 3 SOLUTION RESPIRATORY (INHALATION) at 04:34

## 2018-06-01 RX ADMIN — MONTELUKAST SODIUM 10 MG: 10 TABLET, FILM COATED ORAL at 21:35

## 2018-06-01 RX ADMIN — ESCITALOPRAM OXALATE 20 MG: 10 TABLET, FILM COATED ORAL at 08:28

## 2018-06-01 RX ADMIN — PANTOPRAZOLE SODIUM 40 MG: 40 TABLET, DELAYED RELEASE ORAL at 05:42

## 2018-06-01 RX ADMIN — Medication 10 ML: at 21:36

## 2018-06-01 RX ADMIN — FLUTICASONE PROPIONATE 2 SPRAY: 50 SPRAY, METERED NASAL at 08:29

## 2018-06-01 RX ADMIN — GABAPENTIN 400 MG: 400 CAPSULE ORAL at 14:15

## 2018-06-01 RX ADMIN — DILTIAZEM HYDROCHLORIDE 240 MG: 240 CAPSULE, COATED, EXTENDED RELEASE ORAL at 21:36

## 2018-06-01 RX ADMIN — IPRATROPIUM BROMIDE AND ALBUTEROL SULFATE 3 ML: .5; 3 SOLUTION RESPIRATORY (INHALATION) at 09:15

## 2018-06-01 RX ADMIN — MOMETASONE FUROATE AND FORMOTEROL FUMARATE DIHYDRATE 2 PUFF: 200; 5 AEROSOL RESPIRATORY (INHALATION) at 20:44

## 2018-06-01 RX ADMIN — OXYCODONE AND ACETAMINOPHEN 1 TABLET: 10; 325 TABLET ORAL at 09:42

## 2018-06-01 RX ADMIN — METOPROLOL TARTRATE 100 MG: 50 TABLET ORAL at 21:36

## 2018-06-01 RX ADMIN — METOPROLOL TARTRATE 100 MG: 50 TABLET ORAL at 08:28

## 2018-06-01 RX ADMIN — MICONAZOLE NITRATE: 20 POWDER TOPICAL at 08:29

## 2018-06-01 RX ADMIN — IPRATROPIUM BROMIDE AND ALBUTEROL SULFATE 3 ML: .5; 3 SOLUTION RESPIRATORY (INHALATION) at 23:55

## 2018-06-01 RX ADMIN — IPRATROPIUM BROMIDE AND ALBUTEROL SULFATE 3 ML: .5; 3 SOLUTION RESPIRATORY (INHALATION) at 20:44

## 2018-06-01 RX ADMIN — GABAPENTIN 400 MG: 400 CAPSULE ORAL at 21:35

## 2018-06-01 RX ADMIN — Medication 10 ML: at 08:29

## 2018-06-01 RX ADMIN — MOMETASONE FUROATE AND FORMOTEROL FUMARATE DIHYDRATE 2 PUFF: 200; 5 AEROSOL RESPIRATORY (INHALATION) at 09:15

## 2018-06-01 RX ADMIN — INSULIN GLARGINE 110 UNITS: 100 INJECTION, SOLUTION SUBCUTANEOUS at 21:34

## 2018-06-01 RX ADMIN — DILTIAZEM HYDROCHLORIDE 240 MG: 240 CAPSULE, COATED, EXTENDED RELEASE ORAL at 08:28

## 2018-06-01 RX ADMIN — INSULIN LISPRO 2 UNITS: 100 INJECTION, SOLUTION INTRAVENOUS; SUBCUTANEOUS at 17:41

## 2018-06-01 RX ADMIN — OXYCODONE AND ACETAMINOPHEN 1 TABLET: 10; 325 TABLET ORAL at 05:42

## 2018-06-01 RX ADMIN — SODIUM CHLORIDE: 9 INJECTION, SOLUTION INTRAVENOUS at 12:00

## 2018-06-01 RX ADMIN — GABAPENTIN 400 MG: 400 CAPSULE ORAL at 08:29

## 2018-06-01 RX ADMIN — IPRATROPIUM BROMIDE AND ALBUTEROL SULFATE 3 ML: .5; 3 SOLUTION RESPIRATORY (INHALATION) at 17:02

## 2018-06-01 RX ADMIN — CETIRIZINE HYDROCHLORIDE 5 MG: 10 TABLET, FILM COATED ORAL at 08:29

## 2018-06-01 RX ADMIN — FENOFIBRATE 160 MG: 160 TABLET ORAL at 08:29

## 2018-06-01 RX ADMIN — SODIUM CHLORIDE 75 ML/HR: 9 INJECTION, SOLUTION INTRAVENOUS at 00:14

## 2018-06-01 RX ADMIN — IPRATROPIUM BROMIDE AND ALBUTEROL SULFATE 3 ML: .5; 3 SOLUTION RESPIRATORY (INHALATION) at 00:44

## 2018-06-01 RX ADMIN — MICONAZOLE NITRATE: 20 POWDER TOPICAL at 21:34

## 2018-06-01 RX ADMIN — ALPRAZOLAM 0.5 MG: 0.25 TABLET ORAL at 23:04

## 2018-06-01 RX ADMIN — OXYCODONE AND ACETAMINOPHEN 1 TABLET: 10; 325 TABLET ORAL at 23:04

## 2018-06-01 RX ADMIN — PROPOFOL 150 MG: 10 INJECTION, EMULSION INTRAVENOUS at 12:15

## 2018-06-01 RX ADMIN — INSULIN LISPRO 2 UNITS: 100 INJECTION, SOLUTION INTRAVENOUS; SUBCUTANEOUS at 21:34

## 2018-06-01 ASSESSMENT — PAIN SCALES - GENERAL
PAINLEVEL_OUTOF10: 0
PAINLEVEL_OUTOF10: 2
PAINLEVEL_OUTOF10: 0
PAINLEVEL_OUTOF10: 8
PAINLEVEL_OUTOF10: 5
PAINLEVEL_OUTOF10: 0
PAINLEVEL_OUTOF10: 0
PAINLEVEL_OUTOF10: 6
PAINLEVEL_OUTOF10: 0
PAINLEVEL_OUTOF10: 5

## 2018-06-01 ASSESSMENT — PAIN DESCRIPTION - PROGRESSION: CLINICAL_PROGRESSION: GRADUALLY IMPROVING

## 2018-06-01 ASSESSMENT — PAIN DESCRIPTION - FREQUENCY
FREQUENCY: CONTINUOUS
FREQUENCY: CONTINUOUS

## 2018-06-01 ASSESSMENT — PAIN DESCRIPTION - LOCATION
LOCATION: FOOT;LEG
LOCATION: FOOT
LOCATION: FOOT

## 2018-06-01 ASSESSMENT — PAIN DESCRIPTION - DESCRIPTORS
DESCRIPTORS: ACHING;DISCOMFORT
DESCRIPTORS: DISCOMFORT
DESCRIPTORS: ACHING;DISCOMFORT

## 2018-06-01 ASSESSMENT — PAIN DESCRIPTION - PAIN TYPE
TYPE: CHRONIC PAIN

## 2018-06-01 ASSESSMENT — PAIN DESCRIPTION - ORIENTATION
ORIENTATION: RIGHT;LEFT
ORIENTATION: RIGHT;LEFT

## 2018-06-01 ASSESSMENT — PAIN SCALES - WONG BAKER: WONGBAKER_NUMERICALRESPONSE: 0

## 2018-06-02 LAB
ALBUMIN SERPL-MCNC: 4.1 G/DL (ref 3.5–5.2)
ALP BLD-CCNC: 52 U/L (ref 35–104)
ALT SERPL-CCNC: 17 U/L (ref 0–32)
ANION GAP SERPL CALCULATED.3IONS-SCNC: 9 MMOL/L (ref 7–16)
AST SERPL-CCNC: 15 U/L (ref 0–31)
BASOPHILS ABSOLUTE: 0.03 E9/L (ref 0–0.2)
BASOPHILS RELATIVE PERCENT: 0.4 % (ref 0–2)
BILIRUB SERPL-MCNC: 0.2 MG/DL (ref 0–1.2)
BILIRUBIN DIRECT: <0.2 MG/DL (ref 0–0.3)
BILIRUBIN, INDIRECT: ABNORMAL MG/DL (ref 0–1)
BUN BLDV-MCNC: 24 MG/DL (ref 8–23)
CALCIUM SERPL-MCNC: 9.3 MG/DL (ref 8.6–10.2)
CHLORIDE BLD-SCNC: 99 MMOL/L (ref 98–107)
CLOTEST: NORMAL
CO2: 31 MMOL/L (ref 22–29)
CREAT SERPL-MCNC: 1 MG/DL (ref 0.5–1)
EOSINOPHILS ABSOLUTE: 0.21 E9/L (ref 0.05–0.5)
EOSINOPHILS RELATIVE PERCENT: 3.1 % (ref 0–6)
GFR AFRICAN AMERICAN: >60
GFR NON-AFRICAN AMERICAN: 56 ML/MIN/1.73
GLUCOSE BLD-MCNC: 172 MG/DL (ref 74–109)
HCT VFR BLD CALC: 27.2 % (ref 34–48)
HEMOGLOBIN: 8.2 G/DL (ref 11.5–15.5)
IMMATURE GRANULOCYTES #: 0.03 E9/L
IMMATURE GRANULOCYTES %: 0.4 % (ref 0–5)
LYMPHOCYTES ABSOLUTE: 1.72 E9/L (ref 1.5–4)
LYMPHOCYTES RELATIVE PERCENT: 25.1 % (ref 20–42)
MCH RBC QN AUTO: 26.6 PG (ref 26–35)
MCHC RBC AUTO-ENTMCNC: 30.1 % (ref 32–34.5)
MCV RBC AUTO: 88.3 FL (ref 80–99.9)
METER GLUCOSE: 181 MG/DL (ref 70–110)
METER GLUCOSE: 223 MG/DL (ref 70–110)
METER GLUCOSE: 236 MG/DL (ref 70–110)
METER GLUCOSE: 258 MG/DL (ref 70–110)
MONOCYTES ABSOLUTE: 0.49 E9/L (ref 0.1–0.95)
MONOCYTES RELATIVE PERCENT: 7.2 % (ref 2–12)
NEUTROPHILS ABSOLUTE: 4.37 E9/L (ref 1.8–7.3)
NEUTROPHILS RELATIVE PERCENT: 63.8 % (ref 43–80)
PDW BLD-RTO: 15.1 FL (ref 11.5–15)
PLATELET # BLD: 229 E9/L (ref 130–450)
PMV BLD AUTO: 10.2 FL (ref 7–12)
POTASSIUM SERPL-SCNC: 4.3 MMOL/L (ref 3.5–5)
RBC # BLD: 3.08 E12/L (ref 3.5–5.5)
SODIUM BLD-SCNC: 139 MMOL/L (ref 132–146)
TOTAL PROTEIN: 6.3 G/DL (ref 6.4–8.3)
WBC # BLD: 6.9 E9/L (ref 4.5–11.5)

## 2018-06-02 PROCEDURE — 82962 GLUCOSE BLOOD TEST: CPT

## 2018-06-02 PROCEDURE — 2580000003 HC RX 258: Performed by: INTERNAL MEDICINE

## 2018-06-02 PROCEDURE — 94640 AIRWAY INHALATION TREATMENT: CPT

## 2018-06-02 PROCEDURE — 6370000000 HC RX 637 (ALT 250 FOR IP): Performed by: NURSE PRACTITIONER

## 2018-06-02 PROCEDURE — 80076 HEPATIC FUNCTION PANEL: CPT

## 2018-06-02 PROCEDURE — 2060000000 HC ICU INTERMEDIATE R&B

## 2018-06-02 PROCEDURE — 36415 COLL VENOUS BLD VENIPUNCTURE: CPT

## 2018-06-02 PROCEDURE — 6370000000 HC RX 637 (ALT 250 FOR IP): Performed by: CLINICAL NURSE SPECIALIST

## 2018-06-02 PROCEDURE — 2500000003 HC RX 250 WO HCPCS: Performed by: INTERNAL MEDICINE

## 2018-06-02 PROCEDURE — 80048 BASIC METABOLIC PNL TOTAL CA: CPT

## 2018-06-02 PROCEDURE — 85025 COMPLETE CBC W/AUTO DIFF WBC: CPT

## 2018-06-02 RX ORDER — DOCUSATE SODIUM 100 MG/1
100 CAPSULE, LIQUID FILLED ORAL 3 TIMES DAILY
Status: DISCONTINUED | OUTPATIENT
Start: 2018-06-02 | End: 2018-06-03 | Stop reason: HOSPADM

## 2018-06-02 RX ORDER — BUMETANIDE 0.25 MG/ML
0.5 INJECTION, SOLUTION INTRAMUSCULAR; INTRAVENOUS ONCE
Status: COMPLETED | OUTPATIENT
Start: 2018-06-02 | End: 2018-06-02

## 2018-06-02 RX ORDER — FUROSEMIDE 10 MG/ML
20 INJECTION INTRAMUSCULAR; INTRAVENOUS 2 TIMES DAILY
Status: DISCONTINUED | OUTPATIENT
Start: 2018-06-02 | End: 2018-06-02

## 2018-06-02 RX ADMIN — DILTIAZEM HYDROCHLORIDE 240 MG: 240 CAPSULE, COATED, EXTENDED RELEASE ORAL at 20:21

## 2018-06-02 RX ADMIN — INSULIN LISPRO 4 UNITS: 100 INJECTION, SOLUTION INTRAVENOUS; SUBCUTANEOUS at 17:07

## 2018-06-02 RX ADMIN — GABAPENTIN 400 MG: 400 CAPSULE ORAL at 08:56

## 2018-06-02 RX ADMIN — IPRATROPIUM BROMIDE AND ALBUTEROL SULFATE 3 ML: .5; 3 SOLUTION RESPIRATORY (INHALATION) at 04:02

## 2018-06-02 RX ADMIN — METOPROLOL TARTRATE 100 MG: 50 TABLET ORAL at 08:56

## 2018-06-02 RX ADMIN — IPRATROPIUM BROMIDE AND ALBUTEROL SULFATE 3 ML: .5; 3 SOLUTION RESPIRATORY (INHALATION) at 16:09

## 2018-06-02 RX ADMIN — DOCUSATE SODIUM 100 MG: 100 CAPSULE, LIQUID FILLED ORAL at 10:44

## 2018-06-02 RX ADMIN — DOCUSATE SODIUM 100 MG: 100 CAPSULE, LIQUID FILLED ORAL at 20:21

## 2018-06-02 RX ADMIN — OXYCODONE AND ACETAMINOPHEN 1 TABLET: 10; 325 TABLET ORAL at 09:02

## 2018-06-02 RX ADMIN — METOPROLOL TARTRATE 100 MG: 50 TABLET ORAL at 20:22

## 2018-06-02 RX ADMIN — MICONAZOLE NITRATE: 20 POWDER TOPICAL at 09:03

## 2018-06-02 RX ADMIN — OXYCODONE AND ACETAMINOPHEN 1 TABLET: 10; 325 TABLET ORAL at 17:07

## 2018-06-02 RX ADMIN — MICONAZOLE NITRATE: 20 POWDER TOPICAL at 20:21

## 2018-06-02 RX ADMIN — FERROUS SULFATE TAB 325 MG (65 MG ELEMENTAL FE) 325 MG: 325 (65 FE) TAB at 08:56

## 2018-06-02 RX ADMIN — ESCITALOPRAM OXALATE 20 MG: 10 TABLET, FILM COATED ORAL at 08:56

## 2018-06-02 RX ADMIN — Medication 10 ML: at 10:38

## 2018-06-02 RX ADMIN — ASPIRIN 81 MG: 81 TABLET, COATED ORAL at 08:56

## 2018-06-02 RX ADMIN — IPRATROPIUM BROMIDE AND ALBUTEROL SULFATE 3 ML: .5; 3 SOLUTION RESPIRATORY (INHALATION) at 09:25

## 2018-06-02 RX ADMIN — FENOFIBRATE 160 MG: 160 TABLET ORAL at 08:56

## 2018-06-02 RX ADMIN — BUMETANIDE 0.5 MG: 0.25 INJECTION INTRAMUSCULAR; INTRAVENOUS at 10:37

## 2018-06-02 RX ADMIN — CETIRIZINE HYDROCHLORIDE 5 MG: 10 TABLET, FILM COATED ORAL at 08:56

## 2018-06-02 RX ADMIN — DOCUSATE SODIUM 100 MG: 100 CAPSULE, LIQUID FILLED ORAL at 15:21

## 2018-06-02 RX ADMIN — INSULIN LISPRO 6 UNITS: 100 INJECTION, SOLUTION INTRAVENOUS; SUBCUTANEOUS at 11:59

## 2018-06-02 RX ADMIN — MOMETASONE FUROATE AND FORMOTEROL FUMARATE DIHYDRATE 2 PUFF: 200; 5 AEROSOL RESPIRATORY (INHALATION) at 20:05

## 2018-06-02 RX ADMIN — IPRATROPIUM BROMIDE AND ALBUTEROL SULFATE 3 ML: .5; 3 SOLUTION RESPIRATORY (INHALATION) at 12:40

## 2018-06-02 RX ADMIN — INSULIN LISPRO 2 UNITS: 100 INJECTION, SOLUTION INTRAVENOUS; SUBCUTANEOUS at 08:57

## 2018-06-02 RX ADMIN — PANTOPRAZOLE SODIUM 40 MG: 40 TABLET, DELAYED RELEASE ORAL at 06:20

## 2018-06-02 RX ADMIN — GABAPENTIN 400 MG: 400 CAPSULE ORAL at 14:25

## 2018-06-02 RX ADMIN — INSULIN LISPRO 2 UNITS: 100 INJECTION, SOLUTION INTRAVENOUS; SUBCUTANEOUS at 20:28

## 2018-06-02 RX ADMIN — IPRATROPIUM BROMIDE AND ALBUTEROL SULFATE 3 ML: .5; 3 SOLUTION RESPIRATORY (INHALATION) at 20:05

## 2018-06-02 RX ADMIN — Medication 10 ML: at 08:56

## 2018-06-02 RX ADMIN — MONTELUKAST SODIUM 10 MG: 10 TABLET, FILM COATED ORAL at 20:22

## 2018-06-02 RX ADMIN — INSULIN GLARGINE 110 UNITS: 100 INJECTION, SOLUTION SUBCUTANEOUS at 20:27

## 2018-06-02 RX ADMIN — GABAPENTIN 400 MG: 400 CAPSULE ORAL at 20:21

## 2018-06-02 RX ADMIN — DILTIAZEM HYDROCHLORIDE 240 MG: 240 CAPSULE, COATED, EXTENDED RELEASE ORAL at 08:56

## 2018-06-02 RX ADMIN — Medication 10 ML: at 20:22

## 2018-06-02 RX ADMIN — FLUTICASONE PROPIONATE 2 SPRAY: 50 SPRAY, METERED NASAL at 09:03

## 2018-06-02 ASSESSMENT — PAIN SCALES - GENERAL
PAINLEVEL_OUTOF10: 5
PAINLEVEL_OUTOF10: 6
PAINLEVEL_OUTOF10: 5

## 2018-06-02 ASSESSMENT — PAIN DESCRIPTION - DESCRIPTORS: DESCRIPTORS: ACHING;DISCOMFORT;SORE

## 2018-06-02 ASSESSMENT — PAIN DESCRIPTION - FREQUENCY: FREQUENCY: CONTINUOUS

## 2018-06-02 ASSESSMENT — PAIN DESCRIPTION - ORIENTATION: ORIENTATION: RIGHT;LEFT

## 2018-06-02 ASSESSMENT — PAIN DESCRIPTION - LOCATION: LOCATION: FOOT;HEAD

## 2018-06-02 ASSESSMENT — PAIN DESCRIPTION - PAIN TYPE: TYPE: CHRONIC PAIN

## 2018-06-03 VITALS
SYSTOLIC BLOOD PRESSURE: 118 MMHG | OXYGEN SATURATION: 98 % | BODY MASS INDEX: 51.91 KG/M2 | HEART RATE: 70 BPM | DIASTOLIC BLOOD PRESSURE: 62 MMHG | WEIGHT: 293 LBS | TEMPERATURE: 98 F | RESPIRATION RATE: 18 BRPM | HEIGHT: 63 IN

## 2018-06-03 LAB
ALBUMIN SERPL-MCNC: 4.1 G/DL (ref 3.5–5.2)
ALP BLD-CCNC: 52 U/L (ref 35–104)
ALT SERPL-CCNC: 18 U/L (ref 0–32)
ANION GAP SERPL CALCULATED.3IONS-SCNC: 8 MMOL/L (ref 7–16)
AST SERPL-CCNC: 16 U/L (ref 0–31)
BASOPHILS ABSOLUTE: 0.03 E9/L (ref 0–0.2)
BASOPHILS RELATIVE PERCENT: 0.5 % (ref 0–2)
BILIRUB SERPL-MCNC: 0.3 MG/DL (ref 0–1.2)
BILIRUBIN DIRECT: <0.2 MG/DL (ref 0–0.3)
BILIRUBIN, INDIRECT: ABNORMAL MG/DL (ref 0–1)
BUN BLDV-MCNC: 25 MG/DL (ref 8–23)
CALCIUM SERPL-MCNC: 9.4 MG/DL (ref 8.6–10.2)
CHLORIDE BLD-SCNC: 98 MMOL/L (ref 98–107)
CO2: 32 MMOL/L (ref 22–29)
CREAT SERPL-MCNC: 1 MG/DL (ref 0.5–1)
EOSINOPHILS ABSOLUTE: 0.16 E9/L (ref 0.05–0.5)
EOSINOPHILS RELATIVE PERCENT: 2.6 % (ref 0–6)
GFR AFRICAN AMERICAN: >60
GFR NON-AFRICAN AMERICAN: 56 ML/MIN/1.73
GLUCOSE BLD-MCNC: 175 MG/DL (ref 74–109)
HCT VFR BLD CALC: 26.4 % (ref 34–48)
HEMOGLOBIN: 8 G/DL (ref 11.5–15.5)
IMMATURE GRANULOCYTES #: 0.05 E9/L
IMMATURE GRANULOCYTES %: 0.8 % (ref 0–5)
LYMPHOCYTES ABSOLUTE: 1.37 E9/L (ref 1.5–4)
LYMPHOCYTES RELATIVE PERCENT: 22.1 % (ref 20–42)
MCH RBC QN AUTO: 26.5 PG (ref 26–35)
MCHC RBC AUTO-ENTMCNC: 30.3 % (ref 32–34.5)
MCV RBC AUTO: 87.4 FL (ref 80–99.9)
METER GLUCOSE: 230 MG/DL (ref 70–110)
METER GLUCOSE: 256 MG/DL (ref 70–110)
METER GLUCOSE: 268 MG/DL (ref 70–110)
MONOCYTES ABSOLUTE: 0.44 E9/L (ref 0.1–0.95)
MONOCYTES RELATIVE PERCENT: 7.1 % (ref 2–12)
NEUTROPHILS ABSOLUTE: 4.15 E9/L (ref 1.8–7.3)
NEUTROPHILS RELATIVE PERCENT: 66.9 % (ref 43–80)
PDW BLD-RTO: 14.9 FL (ref 11.5–15)
PLATELET # BLD: 215 E9/L (ref 130–450)
PMV BLD AUTO: 9.8 FL (ref 7–12)
POTASSIUM SERPL-SCNC: 4.4 MMOL/L (ref 3.5–5)
RBC # BLD: 3.02 E12/L (ref 3.5–5.5)
SODIUM BLD-SCNC: 138 MMOL/L (ref 132–146)
TOTAL PROTEIN: 6.2 G/DL (ref 6.4–8.3)
WBC # BLD: 6.2 E9/L (ref 4.5–11.5)

## 2018-06-03 PROCEDURE — 36415 COLL VENOUS BLD VENIPUNCTURE: CPT

## 2018-06-03 PROCEDURE — 6370000000 HC RX 637 (ALT 250 FOR IP): Performed by: NURSE PRACTITIONER

## 2018-06-03 PROCEDURE — 2700000000 HC OXYGEN THERAPY PER DAY

## 2018-06-03 PROCEDURE — 2580000003 HC RX 258: Performed by: INTERNAL MEDICINE

## 2018-06-03 PROCEDURE — 80048 BASIC METABOLIC PNL TOTAL CA: CPT

## 2018-06-03 PROCEDURE — 6370000000 HC RX 637 (ALT 250 FOR IP): Performed by: INTERNAL MEDICINE

## 2018-06-03 PROCEDURE — 80076 HEPATIC FUNCTION PANEL: CPT

## 2018-06-03 PROCEDURE — 6370000000 HC RX 637 (ALT 250 FOR IP): Performed by: CLINICAL NURSE SPECIALIST

## 2018-06-03 PROCEDURE — 94640 AIRWAY INHALATION TREATMENT: CPT

## 2018-06-03 PROCEDURE — 85025 COMPLETE CBC W/AUTO DIFF WBC: CPT

## 2018-06-03 PROCEDURE — 82962 GLUCOSE BLOOD TEST: CPT

## 2018-06-03 RX ORDER — IPRATROPIUM BROMIDE AND ALBUTEROL SULFATE 2.5; .5 MG/3ML; MG/3ML
1 SOLUTION RESPIRATORY (INHALATION) EVERY 4 HOURS
Qty: 540 ML | Refills: 0 | Status: SHIPPED | OUTPATIENT
Start: 2018-06-03 | End: 2018-06-12 | Stop reason: SDUPTHER

## 2018-06-03 RX ORDER — GABAPENTIN 800 MG/1
400 TABLET ORAL 3 TIMES DAILY
Qty: 90 TABLET | Refills: 0 | Status: SHIPPED | OUTPATIENT
Start: 2018-06-03 | End: 2018-08-10 | Stop reason: SDUPTHER

## 2018-06-03 RX ORDER — TORSEMIDE 20 MG/1
20 TABLET ORAL DAILY
Qty: 30 TABLET | Refills: 0 | Status: SHIPPED | OUTPATIENT
Start: 2018-06-03 | End: 2018-07-06 | Stop reason: SDUPTHER

## 2018-06-03 RX ORDER — TORSEMIDE 20 MG/1
20 TABLET ORAL DAILY
Status: DISCONTINUED | OUTPATIENT
Start: 2018-06-03 | End: 2018-06-03 | Stop reason: HOSPADM

## 2018-06-03 RX ADMIN — MOMETASONE FUROATE AND FORMOTEROL FUMARATE DIHYDRATE 2 PUFF: 200; 5 AEROSOL RESPIRATORY (INHALATION) at 08:28

## 2018-06-03 RX ADMIN — FENOFIBRATE 160 MG: 160 TABLET ORAL at 09:30

## 2018-06-03 RX ADMIN — CETIRIZINE HYDROCHLORIDE 5 MG: 10 TABLET, FILM COATED ORAL at 09:30

## 2018-06-03 RX ADMIN — IPRATROPIUM BROMIDE AND ALBUTEROL SULFATE 3 ML: .5; 3 SOLUTION RESPIRATORY (INHALATION) at 11:51

## 2018-06-03 RX ADMIN — IPRATROPIUM BROMIDE AND ALBUTEROL SULFATE 3 ML: .5; 3 SOLUTION RESPIRATORY (INHALATION) at 08:28

## 2018-06-03 RX ADMIN — ALPRAZOLAM 0.5 MG: 0.25 TABLET ORAL at 00:40

## 2018-06-03 RX ADMIN — INSULIN LISPRO 6 UNITS: 100 INJECTION, SOLUTION INTRAVENOUS; SUBCUTANEOUS at 13:02

## 2018-06-03 RX ADMIN — ESCITALOPRAM OXALATE 20 MG: 10 TABLET, FILM COATED ORAL at 09:30

## 2018-06-03 RX ADMIN — INSULIN LISPRO 6 UNITS: 100 INJECTION, SOLUTION INTRAVENOUS; SUBCUTANEOUS at 17:15

## 2018-06-03 RX ADMIN — GABAPENTIN 400 MG: 400 CAPSULE ORAL at 09:30

## 2018-06-03 RX ADMIN — FLUTICASONE PROPIONATE 2 SPRAY: 50 SPRAY, METERED NASAL at 09:32

## 2018-06-03 RX ADMIN — ASPIRIN 81 MG: 81 TABLET, COATED ORAL at 09:30

## 2018-06-03 RX ADMIN — IPRATROPIUM BROMIDE AND ALBUTEROL SULFATE 3 ML: .5; 3 SOLUTION RESPIRATORY (INHALATION) at 00:28

## 2018-06-03 RX ADMIN — OXYCODONE AND ACETAMINOPHEN 1 TABLET: 10; 325 TABLET ORAL at 00:40

## 2018-06-03 RX ADMIN — PANTOPRAZOLE SODIUM 40 MG: 40 TABLET, DELAYED RELEASE ORAL at 05:36

## 2018-06-03 RX ADMIN — GABAPENTIN 400 MG: 400 CAPSULE ORAL at 13:02

## 2018-06-03 RX ADMIN — DOCUSATE SODIUM 100 MG: 100 CAPSULE, LIQUID FILLED ORAL at 13:02

## 2018-06-03 RX ADMIN — IPRATROPIUM BROMIDE AND ALBUTEROL SULFATE 3 ML: .5; 3 SOLUTION RESPIRATORY (INHALATION) at 05:21

## 2018-06-03 RX ADMIN — MICONAZOLE NITRATE: 20 POWDER TOPICAL at 09:32

## 2018-06-03 RX ADMIN — TORSEMIDE 20 MG: 20 TABLET ORAL at 17:27

## 2018-06-03 RX ADMIN — METOPROLOL TARTRATE 100 MG: 50 TABLET ORAL at 09:30

## 2018-06-03 RX ADMIN — IPRATROPIUM BROMIDE AND ALBUTEROL SULFATE 3 ML: .5; 3 SOLUTION RESPIRATORY (INHALATION) at 15:31

## 2018-06-03 RX ADMIN — INSULIN LISPRO 4 UNITS: 100 INJECTION, SOLUTION INTRAVENOUS; SUBCUTANEOUS at 09:01

## 2018-06-03 RX ADMIN — DILTIAZEM HYDROCHLORIDE 240 MG: 240 CAPSULE, COATED, EXTENDED RELEASE ORAL at 09:30

## 2018-06-03 RX ADMIN — FERROUS SULFATE TAB 325 MG (65 MG ELEMENTAL FE) 325 MG: 325 (65 FE) TAB at 09:30

## 2018-06-03 RX ADMIN — DOCUSATE SODIUM 100 MG: 100 CAPSULE, LIQUID FILLED ORAL at 09:30

## 2018-06-03 RX ADMIN — Medication 10 ML: at 09:30

## 2018-06-03 RX ADMIN — OXYCODONE AND ACETAMINOPHEN 1 TABLET: 10; 325 TABLET ORAL at 09:26

## 2018-06-03 ASSESSMENT — PAIN SCALES - GENERAL
PAINLEVEL_OUTOF10: 4
PAINLEVEL_OUTOF10: 0
PAINLEVEL_OUTOF10: 5
PAINLEVEL_OUTOF10: 7

## 2018-06-03 ASSESSMENT — PAIN DESCRIPTION - DESCRIPTORS
DESCRIPTORS: ACHING
DESCRIPTORS: ACHING

## 2018-06-03 ASSESSMENT — PAIN DESCRIPTION - ONSET
ONSET: ON-GOING
ONSET: ON-GOING

## 2018-06-03 ASSESSMENT — PAIN DESCRIPTION - PAIN TYPE
TYPE: CHRONIC PAIN
TYPE: CHRONIC PAIN

## 2018-06-03 ASSESSMENT — PAIN DESCRIPTION - PROGRESSION
CLINICAL_PROGRESSION: NOT CHANGED
CLINICAL_PROGRESSION: NOT CHANGED

## 2018-06-03 ASSESSMENT — PAIN DESCRIPTION - FREQUENCY
FREQUENCY: CONTINUOUS
FREQUENCY: CONTINUOUS

## 2018-06-03 ASSESSMENT — PAIN DESCRIPTION - LOCATION
LOCATION: BACK
LOCATION: BACK

## 2018-06-03 ASSESSMENT — PAIN DESCRIPTION - ORIENTATION
ORIENTATION: LOWER
ORIENTATION: LOWER

## 2018-06-05 ENCOUNTER — TELEPHONE (OUTPATIENT)
Dept: FAMILY MEDICINE CLINIC | Age: 63
End: 2018-06-05

## 2018-06-06 ENCOUNTER — TELEPHONE (OUTPATIENT)
Dept: FAMILY MEDICINE CLINIC | Age: 63
End: 2018-06-06

## 2018-06-06 RX ORDER — ESCITALOPRAM OXALATE 20 MG/1
TABLET ORAL
Qty: 90 TABLET | Refills: 1 | Status: SHIPPED | OUTPATIENT
Start: 2018-06-06 | End: 2020-01-23 | Stop reason: SDUPTHER

## 2018-06-11 ENCOUNTER — TELEPHONE (OUTPATIENT)
Dept: FAMILY MEDICINE CLINIC | Age: 63
End: 2018-06-11

## 2018-06-12 ENCOUNTER — HOSPITAL ENCOUNTER (OUTPATIENT)
Age: 63
Discharge: HOME | End: 2018-06-14
Payer: COMMERCIAL

## 2018-06-12 ENCOUNTER — OFFICE VISIT (OUTPATIENT)
Dept: FAMILY MEDICINE CLINIC | Age: 63
End: 2018-06-12
Payer: COMMERCIAL

## 2018-06-12 VITALS
OXYGEN SATURATION: 96 % | HEIGHT: 63 IN | DIASTOLIC BLOOD PRESSURE: 73 MMHG | WEIGHT: 293 LBS | RESPIRATION RATE: 22 BRPM | BODY MASS INDEX: 51.91 KG/M2 | SYSTOLIC BLOOD PRESSURE: 152 MMHG | HEART RATE: 80 BPM

## 2018-06-12 DIAGNOSIS — I27.20 PULMONARY HYPERTENSION (HCC): ICD-10-CM

## 2018-06-12 DIAGNOSIS — R30.0 DYSURIA: ICD-10-CM

## 2018-06-12 DIAGNOSIS — B37.2 YEAST INFECTION OF THE SKIN: ICD-10-CM

## 2018-06-12 DIAGNOSIS — E11.40 TYPE 2 DIABETES MELLITUS WITH DIABETIC NEUROPATHY, WITH LONG-TERM CURRENT USE OF INSULIN (HCC): ICD-10-CM

## 2018-06-12 DIAGNOSIS — E11.42 DIABETIC POLYNEUROPATHY ASSOCIATED WITH TYPE 2 DIABETES MELLITUS (HCC): ICD-10-CM

## 2018-06-12 DIAGNOSIS — I89.0 LYMPHEDEMA OF BOTH LOWER EXTREMITIES: ICD-10-CM

## 2018-06-12 DIAGNOSIS — L03.116 CELLULITIS OF LEFT LOWER EXTREMITY: ICD-10-CM

## 2018-06-12 DIAGNOSIS — Z09 HOSPITAL DISCHARGE FOLLOW-UP: Primary | ICD-10-CM

## 2018-06-12 DIAGNOSIS — L91.8 SKIN TAGS, MULTIPLE ACQUIRED: ICD-10-CM

## 2018-06-12 DIAGNOSIS — Z79.4 TYPE 2 DIABETES MELLITUS WITH DIABETIC NEUROPATHY, WITH LONG-TERM CURRENT USE OF INSULIN (HCC): ICD-10-CM

## 2018-06-12 LAB
ANION GAP SERPL CALCULATED.3IONS-SCNC: 18 MMOL/L (ref 7–16)
BUN BLDV-MCNC: 29 MG/DL (ref 8–23)
CALCIUM SERPL-MCNC: 9.6 MG/DL (ref 8.6–10.2)
CHLORIDE BLD-SCNC: 99 MMOL/L (ref 98–107)
CO2: 27 MMOL/L (ref 22–29)
CREAT SERPL-MCNC: 0.9 MG/DL (ref 0.5–1)
GFR AFRICAN AMERICAN: >60
GFR NON-AFRICAN AMERICAN: >60 ML/MIN/1.73
GLUCOSE BLD-MCNC: 213 MG/DL (ref 74–109)
POTASSIUM SERPL-SCNC: 4.6 MMOL/L (ref 3.5–5)
SODIUM BLD-SCNC: 144 MMOL/L (ref 132–146)

## 2018-06-12 PROCEDURE — G8427 DOCREV CUR MEDS BY ELIG CLIN: HCPCS | Performed by: FAMILY MEDICINE

## 2018-06-12 PROCEDURE — 2022F DILAT RTA XM EVC RTNOPTHY: CPT | Performed by: FAMILY MEDICINE

## 2018-06-12 PROCEDURE — 1036F TOBACCO NON-USER: CPT | Performed by: FAMILY MEDICINE

## 2018-06-12 PROCEDURE — G8598 ASA/ANTIPLAT THER USED: HCPCS | Performed by: FAMILY MEDICINE

## 2018-06-12 PROCEDURE — G8417 CALC BMI ABV UP PARAM F/U: HCPCS | Performed by: FAMILY MEDICINE

## 2018-06-12 PROCEDURE — 3045F PR MOST RECENT HEMOGLOBIN A1C LEVEL 7.0-9.0%: CPT | Performed by: FAMILY MEDICINE

## 2018-06-12 PROCEDURE — 1111F DSCHRG MED/CURRENT MED MERGE: CPT | Performed by: FAMILY MEDICINE

## 2018-06-12 PROCEDURE — 80048 BASIC METABOLIC PNL TOTAL CA: CPT

## 2018-06-12 PROCEDURE — 99215 OFFICE O/P EST HI 40 MIN: CPT | Performed by: FAMILY MEDICINE

## 2018-06-12 PROCEDURE — 3017F COLORECTAL CA SCREEN DOC REV: CPT | Performed by: FAMILY MEDICINE

## 2018-06-12 RX ORDER — NYSTATIN 100000 U/G
CREAM TOPICAL
Qty: 60 G | Refills: 2 | Status: SHIPPED | OUTPATIENT
Start: 2018-06-12 | End: 2019-09-30

## 2018-06-12 RX ORDER — POTASSIUM CHLORIDE 750 MG/1
TABLET, FILM COATED, EXTENDED RELEASE ORAL
Refills: 5 | COMMUNITY
Start: 2018-05-25 | End: 2018-07-19 | Stop reason: ALTCHOICE

## 2018-06-12 RX ORDER — CEPHALEXIN 250 MG/1
250 CAPSULE ORAL 3 TIMES DAILY
Qty: 21 CAPSULE | Refills: 0 | Status: SHIPPED | OUTPATIENT
Start: 2018-06-12 | End: 2018-07-26 | Stop reason: ALTCHOICE

## 2018-06-12 RX ORDER — SPIRONOLACTONE 50 MG/1
TABLET, FILM COATED ORAL
Refills: 5 | Status: ON HOLD | COMMUNITY
Start: 2018-05-24 | End: 2018-12-20 | Stop reason: HOSPADM

## 2018-06-13 ENCOUNTER — TELEPHONE (OUTPATIENT)
Dept: FAMILY MEDICINE CLINIC | Age: 63
End: 2018-06-13

## 2018-06-13 ENCOUNTER — OFFICE VISIT (OUTPATIENT)
Dept: CARDIOLOGY CLINIC | Age: 63
End: 2018-06-13
Payer: COMMERCIAL

## 2018-06-13 VITALS
BODY MASS INDEX: 51.91 KG/M2 | DIASTOLIC BLOOD PRESSURE: 60 MMHG | WEIGHT: 293 LBS | RESPIRATION RATE: 16 BRPM | HEART RATE: 73 BPM | HEIGHT: 63 IN | SYSTOLIC BLOOD PRESSURE: 118 MMHG

## 2018-06-13 DIAGNOSIS — I10 HYPERTENSION, UNSPECIFIED TYPE: Primary | ICD-10-CM

## 2018-06-13 DIAGNOSIS — I50.33 ACUTE ON CHRONIC DIASTOLIC CONGESTIVE HEART FAILURE (HCC): ICD-10-CM

## 2018-06-13 PROCEDURE — 3017F COLORECTAL CA SCREEN DOC REV: CPT | Performed by: INTERNAL MEDICINE

## 2018-06-13 PROCEDURE — 1111F DSCHRG MED/CURRENT MED MERGE: CPT | Performed by: INTERNAL MEDICINE

## 2018-06-13 PROCEDURE — G8427 DOCREV CUR MEDS BY ELIG CLIN: HCPCS | Performed by: INTERNAL MEDICINE

## 2018-06-13 PROCEDURE — 99214 OFFICE O/P EST MOD 30 MIN: CPT | Performed by: INTERNAL MEDICINE

## 2018-06-13 PROCEDURE — 93000 ELECTROCARDIOGRAM COMPLETE: CPT | Performed by: INTERNAL MEDICINE

## 2018-06-13 PROCEDURE — G8598 ASA/ANTIPLAT THER USED: HCPCS | Performed by: INTERNAL MEDICINE

## 2018-06-13 PROCEDURE — G8417 CALC BMI ABV UP PARAM F/U: HCPCS | Performed by: INTERNAL MEDICINE

## 2018-06-13 PROCEDURE — 1036F TOBACCO NON-USER: CPT | Performed by: INTERNAL MEDICINE

## 2018-06-13 ASSESSMENT — ENCOUNTER SYMPTOMS
CHEST TIGHTNESS: 0
DIARRHEA: 0
SINUS PRESSURE: 0
ABDOMINAL PAIN: 0
SHORTNESS OF BREATH: 0
NAUSEA: 0
BLOOD IN STOOL: 0
VOMITING: 0
SINUS PAIN: 0
TROUBLE SWALLOWING: 0
CONSTIPATION: 0
COUGH: 0

## 2018-06-14 ENCOUNTER — HOSPITAL ENCOUNTER (EMERGENCY)
Age: 63
Discharge: HOME | End: 2018-06-14
Attending: EMERGENCY MEDICINE
Payer: COMMERCIAL

## 2018-06-14 VITALS
DIASTOLIC BLOOD PRESSURE: 44 MMHG | BODY MASS INDEX: 49.61 KG/M2 | OXYGEN SATURATION: 96 % | TEMPERATURE: 98 F | WEIGHT: 280 LBS | HEIGHT: 63 IN | SYSTOLIC BLOOD PRESSURE: 114 MMHG | HEART RATE: 109 BPM | RESPIRATION RATE: 18 BRPM

## 2018-06-14 DIAGNOSIS — T14.8XXA ABRASION: ICD-10-CM

## 2018-06-14 DIAGNOSIS — S09.90XA CLOSED HEAD INJURY, INITIAL ENCOUNTER: Primary | ICD-10-CM

## 2018-06-14 PROCEDURE — 99284 EMERGENCY DEPT VISIT MOD MDM: CPT

## 2018-06-14 PROCEDURE — 6370000000 HC RX 637 (ALT 250 FOR IP): Performed by: EMERGENCY MEDICINE

## 2018-06-14 RX ORDER — ACETAMINOPHEN 500 MG
1000 TABLET ORAL ONCE
Status: COMPLETED | OUTPATIENT
Start: 2018-06-14 | End: 2018-06-14

## 2018-06-14 RX ADMIN — ACETAMINOPHEN 1000 MG: 500 TABLET ORAL at 12:08

## 2018-06-14 ASSESSMENT — PAIN SCALES - GENERAL
PAINLEVEL_OUTOF10: 2
PAINLEVEL_OUTOF10: 0

## 2018-06-14 ASSESSMENT — PAIN DESCRIPTION - ORIENTATION: ORIENTATION: LEFT

## 2018-06-14 ASSESSMENT — PAIN DESCRIPTION - LOCATION: LOCATION: FOOT

## 2018-06-15 RX ORDER — MONTELUKAST SODIUM 10 MG/1
TABLET ORAL
Qty: 90 TABLET | Refills: 1 | Status: SHIPPED | OUTPATIENT
Start: 2018-06-15 | End: 2018-11-21 | Stop reason: SDUPTHER

## 2018-06-15 RX ORDER — METOPROLOL TARTRATE 100 MG/1
TABLET ORAL
Qty: 180 TABLET | Refills: 1 | Status: SHIPPED | OUTPATIENT
Start: 2018-06-15 | End: 2019-04-05

## 2018-06-19 ENCOUNTER — OFFICE VISIT (OUTPATIENT)
Dept: FAMILY MEDICINE CLINIC | Age: 63
End: 2018-06-19
Payer: COMMERCIAL

## 2018-06-19 VITALS
OXYGEN SATURATION: 90 % | WEIGHT: 293 LBS | DIASTOLIC BLOOD PRESSURE: 71 MMHG | HEART RATE: 108 BPM | RESPIRATION RATE: 20 BRPM | BODY MASS INDEX: 52.43 KG/M2 | SYSTOLIC BLOOD PRESSURE: 145 MMHG
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
## 2018-06-19 DIAGNOSIS — I48.0 PAF (PAROXYSMAL ATRIAL FIBRILLATION) (HCC): ICD-10-CM

## 2018-06-19 DIAGNOSIS — I89.0 LYMPHEDEMA OF BOTH LOWER EXTREMITIES: ICD-10-CM

## 2018-06-19 DIAGNOSIS — Z79.4 TYPE 2 DIABETES MELLITUS WITH DIABETIC NEUROPATHY, WITH LONG-TERM CURRENT USE OF INSULIN (HCC): Primary | ICD-10-CM

## 2018-06-19 DIAGNOSIS — G47.00 INSOMNIA, UNSPECIFIED TYPE: ICD-10-CM

## 2018-06-19 DIAGNOSIS — E11.40 TYPE 2 DIABETES MELLITUS WITH DIABETIC NEUROPATHY, WITH LONG-TERM CURRENT USE OF INSULIN (HCC): Primary | ICD-10-CM

## 2018-06-19 DIAGNOSIS — S76.212A GROIN STRAIN, LEFT, INITIAL ENCOUNTER: ICD-10-CM

## 2018-06-19 PROCEDURE — 1036F TOBACCO NON-USER: CPT | Performed by: FAMILY MEDICINE

## 2018-06-19 PROCEDURE — 99214 OFFICE O/P EST MOD 30 MIN: CPT | Performed by: FAMILY MEDICINE

## 2018-06-19 PROCEDURE — 3017F COLORECTAL CA SCREEN DOC REV: CPT | Performed by: FAMILY MEDICINE

## 2018-06-19 PROCEDURE — 1111F DSCHRG MED/CURRENT MED MERGE: CPT | Performed by: FAMILY MEDICINE

## 2018-06-19 PROCEDURE — G8598 ASA/ANTIPLAT THER USED: HCPCS | Performed by: FAMILY MEDICINE

## 2018-06-19 PROCEDURE — 3045F PR MOST RECENT HEMOGLOBIN A1C LEVEL 7.0-9.0%: CPT | Performed by: FAMILY MEDICINE

## 2018-06-19 PROCEDURE — 2022F DILAT RTA XM EVC RTNOPTHY: CPT | Performed by: FAMILY MEDICINE

## 2018-06-19 PROCEDURE — G8427 DOCREV CUR MEDS BY ELIG CLIN: HCPCS | Performed by: FAMILY MEDICINE

## 2018-06-19 PROCEDURE — G8417 CALC BMI ABV UP PARAM F/U: HCPCS | Performed by: FAMILY MEDICINE

## 2018-06-19 RX ORDER — MELOXICAM 15 MG/1
15 TABLET ORAL DAILY
Qty: 7 TABLET | Refills: 0 | Status: SHIPPED | OUTPATIENT
Start: 2018-06-19 | End: 2018-08-01 | Stop reason: SINTOL

## 2018-06-19 RX ORDER — ZOLPIDEM TARTRATE 10 MG/1
10 TABLET ORAL NIGHTLY PRN
Qty: 30 TABLET | Refills: 0 | Status: SHIPPED | OUTPATIENT
Start: 2018-06-19 | End: 2018-07-26 | Stop reason: SDUPTHER

## 2018-06-20 ENCOUNTER — TELEPHONE (OUTPATIENT)
Dept: FAMILY MEDICINE CLINIC | Age: 63
End: 2018-06-20

## 2018-06-20 ASSESSMENT — ENCOUNTER SYMPTOMS
CONSTIPATION: 0
VOMITING: 0
SHORTNESS OF BREATH: 0
BLOOD IN STOOL: 0
SINUS PAIN: 0
ABDOMINAL PAIN: 0
NAUSEA: 0
COUGH: 0
SINUS PRESSURE: 0
TROUBLE SWALLOWING: 0
CHEST TIGHTNESS: 0
DIARRHEA: 0

## 2018-06-27 DIAGNOSIS — Z79.4 TYPE 2 DIABETES MELLITUS WITHOUT COMPLICATION, WITH LONG-TERM CURRENT USE OF INSULIN (HCC): ICD-10-CM

## 2018-06-27 DIAGNOSIS — E11.9 TYPE 2 DIABETES MELLITUS WITHOUT COMPLICATION, WITH LONG-TERM CURRENT USE OF INSULIN (HCC): ICD-10-CM

## 2018-06-27 RX ORDER — BLOOD SUGAR DIAGNOSTIC
STRIP MISCELLANEOUS
Qty: 100 EACH | Refills: 11 | Status: ON HOLD | OUTPATIENT
Start: 2018-06-27 | End: 2018-12-05 | Stop reason: HOSPADM

## 2018-07-09 RX ORDER — TORSEMIDE 20 MG/1
TABLET ORAL
Qty: 30 TABLET | Refills: 0 | Status: SHIPPED | OUTPATIENT
Start: 2018-07-09 | End: 2018-08-28 | Stop reason: SDUPTHER

## 2018-07-10 ENCOUNTER — HOSPITAL ENCOUNTER (EMERGENCY)
Age: 63
Discharge: HOME | End: 2018-07-10
Attending: EMERGENCY MEDICINE
Payer: COMMERCIAL

## 2018-07-10 ENCOUNTER — APPOINTMENT (OUTPATIENT)
Dept: ULTRASOUND IMAGING | Age: 63
End: 2018-07-10
Payer: COMMERCIAL

## 2018-07-10 VITALS
TEMPERATURE: 98 F | HEIGHT: 63 IN | RESPIRATION RATE: 16 BRPM | OXYGEN SATURATION: 99 % | DIASTOLIC BLOOD PRESSURE: 77 MMHG | HEART RATE: 97 BPM | WEIGHT: 293 LBS | SYSTOLIC BLOOD PRESSURE: 161 MMHG | BODY MASS INDEX: 51.91 KG/M2

## 2018-07-10 DIAGNOSIS — I82.4Z1 ACUTE DEEP VEIN THROMBOSIS (DVT) OF DISTAL VEIN OF RIGHT LOWER EXTREMITY (HCC): Primary | ICD-10-CM

## 2018-07-10 DIAGNOSIS — L03.119 CELLULITIS OF LOWER EXTREMITY, UNSPECIFIED LATERALITY: ICD-10-CM

## 2018-07-10 DIAGNOSIS — Z79.4 TYPE 2 DIABETES MELLITUS WITHOUT COMPLICATION, WITH LONG-TERM CURRENT USE OF INSULIN (HCC): ICD-10-CM

## 2018-07-10 DIAGNOSIS — I87.8 VENOUS STASIS: ICD-10-CM

## 2018-07-10 DIAGNOSIS — E11.9 TYPE 2 DIABETES MELLITUS WITHOUT COMPLICATION, WITH LONG-TERM CURRENT USE OF INSULIN (HCC): ICD-10-CM

## 2018-07-10 LAB
ANION GAP SERPL CALCULATED.3IONS-SCNC: 12 MMOL/L (ref 7–16)
APTT: 30.6 SEC (ref 24.5–35.1)
BASOPHILS ABSOLUTE: 0.05 E9/L (ref 0–0.2)
BASOPHILS RELATIVE PERCENT: 0.6 % (ref 0–2)
BUN BLDV-MCNC: 33 MG/DL (ref 8–23)
CALCIUM SERPL-MCNC: 9.9 MG/DL (ref 8.6–10.2)
CHLORIDE BLD-SCNC: 95 MMOL/L (ref 98–107)
CO2: 31 MMOL/L (ref 22–29)
CREAT SERPL-MCNC: 0.9 MG/DL (ref 0.5–1)
EOSINOPHILS ABSOLUTE: 0.17 E9/L (ref 0.05–0.5)
EOSINOPHILS RELATIVE PERCENT: 2.1 % (ref 0–6)
GFR AFRICAN AMERICAN: >60
GFR NON-AFRICAN AMERICAN: >60 ML/MIN/1.73
GLUCOSE BLD-MCNC: 145 MG/DL (ref 74–109)
HCT VFR BLD CALC: 30.9 % (ref 34–48)
HEMOGLOBIN: 9.7 G/DL (ref 11.5–15.5)
IMMATURE GRANULOCYTES #: 0.03 E9/L
IMMATURE GRANULOCYTES %: 0.4 % (ref 0–5)
INR BLD: 1.1
LYMPHOCYTES ABSOLUTE: 1.81 E9/L (ref 1.5–4)
LYMPHOCYTES RELATIVE PERCENT: 22.7 % (ref 20–42)
MCH RBC QN AUTO: 26.1 PG (ref 26–35)
MCHC RBC AUTO-ENTMCNC: 31.4 % (ref 32–34.5)
MCV RBC AUTO: 83.3 FL (ref 80–99.9)
MONOCYTES ABSOLUTE: 0.53 E9/L (ref 0.1–0.95)
MONOCYTES RELATIVE PERCENT: 6.6 % (ref 2–12)
NEUTROPHILS ABSOLUTE: 5.39 E9/L (ref 1.8–7.3)
NEUTROPHILS RELATIVE PERCENT: 67.6 % (ref 43–80)
PDW BLD-RTO: 15.2 FL (ref 11.5–15)
PLATELET # BLD: 290 E9/L (ref 130–450)
PMV BLD AUTO: 9.8 FL (ref 7–12)
POTASSIUM SERPL-SCNC: 4 MMOL/L (ref 3.5–5)
PROTHROMBIN TIME: 12.7 SEC (ref 9.3–12.4)
RBC # BLD: 3.71 E12/L (ref 3.5–5.5)
SODIUM BLD-SCNC: 138 MMOL/L (ref 132–146)
WBC # BLD: 8 E9/L (ref 4.5–11.5)

## 2018-07-10 PROCEDURE — 93970 EXTREMITY STUDY: CPT

## 2018-07-10 PROCEDURE — 96374 THER/PROPH/DIAG INJ IV PUSH: CPT

## 2018-07-10 PROCEDURE — 96372 THER/PROPH/DIAG INJ SC/IM: CPT

## 2018-07-10 PROCEDURE — 99284 EMERGENCY DEPT VISIT MOD MDM: CPT

## 2018-07-10 PROCEDURE — 85730 THROMBOPLASTIN TIME PARTIAL: CPT

## 2018-07-10 PROCEDURE — 80048 BASIC METABOLIC PNL TOTAL CA: CPT

## 2018-07-10 PROCEDURE — 85025 COMPLETE CBC W/AUTO DIFF WBC: CPT

## 2018-07-10 PROCEDURE — 85610 PROTHROMBIN TIME: CPT

## 2018-07-10 PROCEDURE — 6360000002 HC RX W HCPCS: Performed by: EMERGENCY MEDICINE

## 2018-07-10 RX ORDER — KETOROLAC TROMETHAMINE 30 MG/ML
15 INJECTION, SOLUTION INTRAMUSCULAR; INTRAVENOUS ONCE
Status: COMPLETED | OUTPATIENT
Start: 2018-07-10 | End: 2018-07-10

## 2018-07-10 RX ORDER — DOXYCYCLINE 100 MG/1
100 CAPSULE ORAL 2 TIMES DAILY
Qty: 20 CAPSULE | Refills: 0 | Status: SHIPPED | OUTPATIENT
Start: 2018-07-10 | End: 2018-07-20

## 2018-07-10 RX ADMIN — ENOXAPARIN SODIUM 135 MG: 150 INJECTION SUBCUTANEOUS at 22:10

## 2018-07-10 RX ADMIN — KETOROLAC TROMETHAMINE 15 MG: 30 INJECTION, SOLUTION INTRAMUSCULAR at 20:37

## 2018-07-10 ASSESSMENT — PAIN SCALES - GENERAL: PAINLEVEL_OUTOF10: 8

## 2018-07-11 NOTE — ED PROVIDER NOTES
Department of Emergency Medicine   ED  Provider Note  Admit Date/RoomTime: 7/10/2018  7:03 PM  ED Room: \[facility] [place] [facility]\""/[place] [address]          History of Present Illness:  7/10/18, Time: 8:11 PM         Bettye Lozano is a 57 y.o. female presenting to the ED for bilateral leg pain and swelling, beginning several days ago. The complaint has been constant, moderate in severity, and worsened by nothing. Bilateral leg pain and swelling for the last few days. Saw PCP, started on antibiotics but not improving. Says swelling is getting worse. Bilateral LE edema for the last few days. Aching pain. Says her left leg is more red today. Says she was on keflex by her PCP. She denies any hx of DVT or PE. She denies chest pain or shortness of breath. Denies numbness or tingling or weakness. No trauma    Review of Systems:   Pertinent positives and negatives are stated within HPI, all other systems reviewed and are negative.        --------------------------------------------- PAST HISTORY ---------------------------------------------  Past Medical History:  has a past medical history of Anal fissure; Anemia; Arthritis; Asthma; Atrial fibrillation ([address] Blood transfusion; CAD (coronary artery disease); Disc disorder; Fall due to stumbling; GERD (gastroesophageal reflux disease); GERD (gastroesophageal reflux disease); Hyperlipidemia; Hypertension; Inappropriate sinus tachycardia; Low hemoglobin; LVH (left ventricular hypertrophy); Lymphadenitis, chronic; Occipital neuralgia; Respiratory acidosis; Sinus tachycardia; and Type II or unspecified type diabetes mellitus without mention of complication, not stated as uncontrolled. Past Surgical History:  has a past surgical history that includes Tonsillectomy (); Appendectomy ();  section ();  section ();  section (); Anus surgery (2006); Upper gastrointestinal endoscopy (2004); Colonoscopy (2004);  Colonoscopy (2006); anoscopy Affect          -------------------------------------------------- RESULTS -------------------------------------------------  I have personally reviewed all laboratory and imaging results for this patient. Results are listed below.      LABS:  Results for orders placed or performed during the hospital encounter of 07/10/18   CBC Auto Differential   Result Value Ref Range    WBC 8.0 4.5 - 11.5 E9/L    RBC 3.71 3.50 - 5.50 E12/L    Hemoglobin 9.7 (L) 11.5 - 15.5 g/dL    Hematocrit 30.9 (L) 34.0 - 48.0 %    MCV 83.3 80.0 - 99.9 fL    MCH 26.1 26.0 - 35.0 pg    MCHC 31.4 (L) 32.0 - 34.5 %    RDW 15.2 (H) 11.5 - 15.0 fL    Platelets 986 152 - 452 E9/L    MPV 9.8 7.0 - 12.0 fL    Neutrophils % 67.6 43.0 - 80.0 %    Immature Granulocytes % 0.4 0.0 - 5.0 %    Lymphocytes % 22.7 20.0 - 42.0 %    Monocytes % 6.6 2.0 - 12.0 %    Eosinophils % 2.1 0.0 - 6.0 %    Basophils % 0.6 0.0 - 2.0 %    Neutrophils # 5.39 1.80 - 7.30 E9/L    Immature Granulocytes # 0.03 E9/L    Lymphocytes # 1.81 1.50 - 4.00 E9/L    Monocytes # 0.53 0.10 - 0.95 E9/L    Eosinophils # 0.17 0.05 - 0.50 E9/L    Basophils # 0.05 0.00 - 0.20 O7/S   Basic Metabolic Panel   Result Value Ref Range    Sodium 138 132 - 146 mmol/L    Potassium 4.0 3.5 - 5.0 mmol/L    Chloride 95 (L) 98 - 107 mmol/L    CO2 31 (H) 22 - 29 mmol/L    Anion Gap 12 7 - 16 mmol/L    Glucose 145 (H) 74 - 109 mg/dL    BUN 33 (H) 8 - 23 mg/dL    CREATININE 0.9 0.5 - 1.0 mg/dL    GFR Non-African American >60 >=60 mL/min/1.73    GFR African American >60     Calcium 9.9 8.6 - 10.2 mg/dL   Protime-INR   Result Value Ref Range    Protime 12.7 (H) 9.3 - 12.4 sec    INR 1.1    APTT   Result Value Ref Range    aPTT 30.6 24.5 - 35.1 sec       RADIOLOGY:  Interpreted by Radiologist unless otherwise specified  US DUP LOWER EXTREMITIES BILATERAL VENOUS   Final Result           ------------------------- NURSING NOTES AND VITALS REVIEWED ---------------------------   The nursing notes within the ED encounter and vital signs as below have been reviewed by myself. BP (!) 161/77   Pulse 97   Temp 98 °F (36.7 °C)   Resp 16   Ht 5' 3\" (1.6 m)   Wt 295 lb (133.8 kg)   LMP  (LMP Unknown)   SpO2 99%   BMI 52.26 kg/m²   Oxygen Saturation Interpretation: Normal    The patients available past medical records and past encounters were reviewed. ------------------------------ ED COURSE/MEDICAL DECISION MAKING----------------------  Medications   ketorolac (TORADOL) injection 15 mg (15 mg Intravenous Given 7/10/18 2037)   enoxaparin (LOVENOX) injection 135 mg (135 mg Subcutaneous Given 7/10/18 2210)              Medical Decision Making:    US shows DVT. She denies chest pain or shortness of breath. Legs look like chronic venous statis but left lower leg is slightly more red. She was on keflex which should treat everything except MRSA so doxy was given as well as Eliquis. Re-Evaluations:                improving      This patient's ED course included: a personal history and physicial examination, re-evaluation prior to disposition, IV medications and complex medical decision making and emergency management    This patient has remained hemodynamically stable during their ED course. Counseling: The emergency provider has spoken with the patient and discussed todays results, in addition to providing specific details for the plan of care and counseling regarding the diagnosis and prognosis. Questions are answered at this time and they are agreeable with the plan.       --------------------------------- IMPRESSION AND DISPOSITION ---------------------------------    IMPRESSION  1. Acute deep vein thrombosis (DVT) of distal vein of right lower extremity ([address]    2. Cellulitis of lower extremity, unspecified laterality    3. Venous stasis        DISPOSITION  Disposition: Discharge to home  Patient condition is good        NOTE: This report was transcribed using voice recognition software.  Every effort was made

## 2018-07-13 ENCOUNTER — OFFICE VISIT (OUTPATIENT)
Dept: FAMILY MEDICINE CLINIC | Age: 63
End: 2018-07-13
Payer: COMMERCIAL

## 2018-07-13 VITALS
SYSTOLIC BLOOD PRESSURE: 136 MMHG | RESPIRATION RATE: 18 BRPM | WEIGHT: 290 LBS | BODY MASS INDEX: 51.38 KG/M2 | DIASTOLIC BLOOD PRESSURE: 76 MMHG | HEART RATE: 107 BPM | HEIGHT: 63 IN

## 2018-07-13 DIAGNOSIS — E11.42 DIABETIC POLYNEUROPATHY ASSOCIATED WITH TYPE 2 DIABETES MELLITUS (HCC): ICD-10-CM

## 2018-07-13 DIAGNOSIS — L97.809 VENOUS STASIS ULCER OF OTHER PART OF LOWER LEG WITHOUT VARICOSE VEINS, UNSPECIFIED LATERALITY, UNSPECIFIED ULCER STAGE (HCC): ICD-10-CM

## 2018-07-13 DIAGNOSIS — L03.90 WOUND CELLULITIS: ICD-10-CM

## 2018-07-13 DIAGNOSIS — R22.42 MASS OF THIGH, LEFT: ICD-10-CM

## 2018-07-13 DIAGNOSIS — I87.2 VENOUS STASIS ULCER OF OTHER PART OF LOWER LEG WITHOUT VARICOSE VEINS, UNSPECIFIED LATERALITY, UNSPECIFIED ULCER STAGE (HCC): ICD-10-CM

## 2018-07-13 DIAGNOSIS — Z09 HOSPITAL DISCHARGE FOLLOW-UP: Primary | ICD-10-CM

## 2018-07-13 DIAGNOSIS — M19.91 PRIMARY OSTEOARTHRITIS, UNSPECIFIED SITE: ICD-10-CM

## 2018-07-13 DIAGNOSIS — G89.4 CHRONIC PAIN SYNDROME: ICD-10-CM

## 2018-07-13 DIAGNOSIS — I82.4Z1 DVT, LOWER EXTREMITY, DISTAL, ACUTE, RIGHT (HCC): ICD-10-CM

## 2018-07-13 PROCEDURE — G8598 ASA/ANTIPLAT THER USED: HCPCS | Performed by: FAMILY MEDICINE

## 2018-07-13 PROCEDURE — 3017F COLORECTAL CA SCREEN DOC REV: CPT | Performed by: FAMILY MEDICINE

## 2018-07-13 PROCEDURE — 1036F TOBACCO NON-USER: CPT | Performed by: FAMILY MEDICINE

## 2018-07-13 PROCEDURE — G8417 CALC BMI ABV UP PARAM F/U: HCPCS | Performed by: FAMILY MEDICINE

## 2018-07-13 PROCEDURE — G8427 DOCREV CUR MEDS BY ELIG CLIN: HCPCS | Performed by: FAMILY MEDICINE

## 2018-07-13 PROCEDURE — 3045F PR MOST RECENT HEMOGLOBIN A1C LEVEL 7.0-9.0%: CPT | Performed by: FAMILY MEDICINE

## 2018-07-13 PROCEDURE — 2022F DILAT RTA XM EVC RTNOPTHY: CPT | Performed by: FAMILY MEDICINE

## 2018-07-13 PROCEDURE — 99215 OFFICE O/P EST HI 40 MIN: CPT | Performed by: FAMILY MEDICINE

## 2018-07-13 RX ORDER — FEXOFENADINE HCL 180 MG/1
180 TABLET ORAL DAILY
Qty: 30 TABLET | Refills: 2 | Status: SHIPPED | OUTPATIENT
Start: 2018-07-13 | End: 2018-10-14 | Stop reason: SDUPTHER

## 2018-07-13 NOTE — PROGRESS NOTES
Stan Rich  : 1955    Chief Complaint:     Chief Complaint   Patient presents with    Follow-Up from Hospital     DVT       HPI  Stan Rich 57 y.o. presents for   Chief Complaint   Patient presents with    Follow-Up from Hospital     DVT     Patient presented to the ED on 7/10/18 for leg pain and swelling. She does have a hx of lymphedema and venous stasis. US was completed and did show right DVT which was new. She still continues to have pain in the left thigh. On US there was a mass that was not completely visualized and recommendation to obtain MRI. In the ED she was started on eliquis. She was also started on abx due to her lower extremity cellulitis. She has been taking this and denies any bleeding issues/side effects from medication currently. She does admit to some swelling in her legs and sores on her legs which are new. She does have an appointment with the CHF clinic Monday which I highly advised she follow up with. She also complains of pain all over her body. She does follow with pain management but does not feel they are treating her appropriately. She wishes for a new pain management physician. She does need refills of her insulin. Last A1c was may 2018 at 7.2. May consider reducing insulin dosing in the future as she is on high amounts of insulin. BMI > 50    All questions were answered to patients satisfaction.     Past Medical History:   Diagnosis Date    Anal fissure     Anemia 10/6/2017    Arthritis     Asthma     Atrial fibrillation ([facility]     Blood transfusion     long time no reaction    CAD (coronary artery disease)     Disc disorder     Fall due to stumbling 10/6/2017    GERD (gastroesophageal reflux disease)     GERD (gastroesophageal reflux disease)     Hyperlipidemia     Hypertension     Inappropriate sinus tachycardia     Low hemoglobin 2018    LVH (left ventricular hypertrophy)     moderate    Lymphadenitis, chronic 2018    Occipital Not Currently     Other Topics Concern    None     Social History Narrative    None       Family History   Problem Relation Age of Onset    Heart Disease Mother     Diabetes Father     Colon Cancer Father     Other Father         BLINDNESS    Colon Cancer Sister     Diabetes Paternal Aunt     Diabetes Paternal Uncle     Diabetes Paternal Aunt     Diabetes Paternal Uncle           Current Outpatient Prescriptions   Medication Sig Dispense Refill    fexofenadine (ALLEGRA) 180 MG tablet TAKE 1 TABLET BY MOUTH DAILY 30 tablet 2    insulin lispro (HUMALOG) 100 UNIT/ML injection vial TAKE 90 UNITS WITH BREAKFAST. TAKE 92 UNITS WITH LUNCH. TAKE 94 UNITS WITH DINNER. 60 mL 5    apixaban (ELIQUIS STARTER PACK) 5 MG TABS tablet Take 10 mg (2 tablets) orally twice daily for 7 days, then take 5 mg (1 tablet) orally twice daily thereafter. 74 tablet 0    doxycycline monohydrate (MONODOX) 100 MG capsule Take 1 capsule by mouth 2 times daily for 10 days 20 capsule 0    torsemide (DEMADEX) 20 MG tablet TAKE 1 TABLET BY MOUTH EVERY DAY 30 tablet 0    blood glucose test strips (FREESTYLE LITE) strip USE TO TEST BLOOD SUGAR FOUR TIMES DAILY 100 strip 5    Insulin Syringe-Needle U-100 (B-D INS SYR ULTRAFINE 1CC/31G) 31G X 5/16\" 1 ML MISC USE AS DIRECTED 100 each 11    meloxicam (MOBIC) 15 MG tablet Take 1 tablet by mouth daily 7 tablet 0    zolpidem (AMBIEN) 10 MG tablet Take 1 tablet by mouth nightly as needed for Sleep for up to 30 days. . 30 tablet 0    metoprolol (LOPRESSOR) 100 MG tablet TAKE 1 TABLET BY MOUTH 2 TIMES DAILY 180 tablet 1    montelukast (SINGULAIR) 10 MG tablet TAKE 1 TABLET BY MOUTH NIGHTLY 90 tablet 1    KLOR-CON 10 10 MEQ extended release tablet TAKE 1 TABLET BY MOUTH EVERY OTHER DAY  5    spironolactone (ALDACTONE) 50 MG tablet TAKE 1/2 TABLET BY MOUTH DAILY  5    nystatin (MYCOSTATIN) 361459 UNIT/GM cream Apply topically 2 times daily.  60 g 2    cephALEXin (KEFLEX) 250 MG capsule Take 1 facility-administered medications for this visit. Allergies   Allergen Reactions    Statins Other (See Comments)     Muscle pains       Health Maintenance Due   Topic Date Due    HIV screen  12/02/1968    DTaP/Tdap/Td vaccine (1 - Tdap) 12/02/1972    Shingles Vaccine (1 of 2 - 2 Dose Series) 12/02/2003    Cervical cancer screen  03/15/2016    Low dose CT lung screening  02/02/2018    Diabetic retinal exam  04/27/2018           REVIEW OF SYSTEMS  Review of Systems   Constitutional: Positive for fatigue. Negative for chills and fever. HENT: Negative for congestion, postnasal drip, sinus pain, sinus pressure and sore throat. Eyes: Negative for pain. Respiratory: Positive for shortness of breath. Negative for cough. Cardiovascular: Positive for leg swelling. Gastrointestinal: Positive for abdominal pain. Negative for blood in stool, constipation, diarrhea, nausea and vomiting. Endocrine: Negative for cold intolerance and heat intolerance. Genitourinary: Positive for difficulty urinating and frequency. Negative for dysuria and menstrual problem. Musculoskeletal: Positive for arthralgias, back pain, myalgias and neck pain. Skin: Positive for color change and wound. Allergic/Immunologic: Positive for environmental allergies. Neurological: Positive for numbness and headaches. Negative for dizziness, weakness and light-headedness. Hematological: Negative for adenopathy. Psychiatric/Behavioral: Negative for behavioral problems and dysphoric mood. The patient is not nervous/anxious. PHYSICAL EXAM  /76 (Site: Right Arm)   Pulse 107   Resp 18   Ht 5' 3\" (1.6 m)   Wt 290 lb (131.5 kg)   LMP  (LMP Unknown)   BMI 51.37 kg/m²   Physical Exam   Constitutional: She is oriented to person, place, and time. She appears well-developed and well-nourished. No distress. Uses walker   HENT:   Head: Normocephalic and atraumatic.    Right Ear: External ear normal.   Left Ear: Results   Component Value Date    HDL 34 05/16/2018    HDL 37 05/12/2017    HDL 40 08/15/2016     Lab Results   Component Value Date    LDLCALC 98 05/16/2018    LDLCALC 83 05/12/2017    LDLCALC 101 (H) 08/15/2016       Lab Results   Component Value Date    LABA1C 7.2 05/16/2018    LABA1C 7.2 12/14/2017    LABA1C 8.7 09/18/2017     Lab Results   Component Value Date    LABMICR <12.0 05/12/2017    LDLCALC 98 05/16/2018    CREATININE 0.9 07/10/2018       ASSESSMENT/PLAN:     Diagnosis Orders   1. Hospital discharge follow-up     2. DVT, lower extremity, distal, acute, right ([address]     3. Mass of thigh, left  MRI FEMUR LEFT WO CONTRAST   4. Wound cellulitis  [facility] [address]   5. Venous stasis ulcer of other part of lower leg without varicose veins, unspecified laterality, unspecified ulcer stage ([facility]  [facility] [address]   6. Diabetic polyneuropathy associated with type 2 diabetes mellitus ([facility]  [facility]   7. Primary osteoarthritis, unspecified site  [facility]   8. Chronic pain syndrome  [address] [place] [address]     At this point, her legs are worsening. Will refer to wound care for further evaluation and treatment. Continue the doxycycline for now. Continue the eliquis and will reassess need in 3 months. This is a unprovoked dvt - though provoked may be considered given her immobility and difficulty ambulating due to her comorbid conditions. Treatment at least 3 months - and will re-evaluate at that time. Has bled score 2-3. Patient also wishes for new pain management physician which was placed. Mri also ordered due to abnormal US of the left thigh.      More than 40 minutes was spent with the patient during the encounter, during which more than half the time was spent counseling on the above concerns      Problem list reviewed and simplified/updated  HM reviewed today and counseled as appropriate    Call or go to ED immediately if symptoms worsen or persist.  Future Appointments  Date Time Provider Jason Muir   7/16/2018 1:15 PM [facility] CHF ROOM 1 [facility] CHF None   8/1/2018 10:30 AM DO [facility]     Or sooner if necessary. Educational materials and/or home exercises printed for patient's review and were included in patient instructions on his/her After Visit Summary and given to patient at the end of visit.       Counseled regarding above diagnosis, including possible risks and complications,  especially if left uncontrolled.     Counseled regarding the possible side effects, risks, benefits and alternatives to treatment; patient and/or guardian verbalizes understanding, agrees, feels comfortable with and wishes to proceed with above treatment plan.     Advised patient to call with any new medication issues, and read all Rx info from pharmacy to assure aware of all possible risks and side effects of medication before taking.     Reviewed age and gender appropriate health screening exams and vaccinations. Advised patient regarding importance of keeping up with recommended health maintenance and to schedule as soon as possible if overdue, as this is important in assessing for undiagnosed pathology, especially cancer, as well as protecting against potentially harmful/life threatening disease.          Patient and/or guardian verbalizes understanding and agrees with above counseling, assessment and plan.     All questions answered. Naseem Link, [address], DO  7/13/18    NOTE: This report was transcribed using voice recognition software.  Every effort was made to ensure accuracy; however, inadvertent computerized transcription errors may be present

## 2018-07-16 ENCOUNTER — HOSPITAL ENCOUNTER (OUTPATIENT)
Dept: OTHER | Age: 63
Setting detail: THERAPIES SERIES
Discharge: HOME | End: 2018-07-16
Payer: COMMERCIAL

## 2018-07-16 VITALS
BODY MASS INDEX: 51.19 KG/M2 | DIASTOLIC BLOOD PRESSURE: 63 MMHG | WEIGHT: 289 LBS | SYSTOLIC BLOOD PRESSURE: 140 MMHG | HEART RATE: 91 BPM | RESPIRATION RATE: 18 BRPM

## 2018-07-16 DIAGNOSIS — E11.9 TYPE 2 DIABETES MELLITUS WITHOUT COMPLICATION, WITH LONG-TERM CURRENT USE OF INSULIN (HCC): ICD-10-CM

## 2018-07-16 DIAGNOSIS — Z79.4 TYPE 2 DIABETES MELLITUS WITHOUT COMPLICATION, WITH LONG-TERM CURRENT USE OF INSULIN (HCC): ICD-10-CM

## 2018-07-16 LAB — PRO-BNP: 69 PG/ML (ref 0–125)

## 2018-07-16 PROCEDURE — 36415 COLL VENOUS BLD VENIPUNCTURE: CPT

## 2018-07-16 PROCEDURE — 99214 OFFICE O/P EST MOD 30 MIN: CPT

## 2018-07-16 PROCEDURE — 83880 ASSAY OF NATRIURETIC PEPTIDE: CPT

## 2018-07-16 ASSESSMENT — ENCOUNTER SYMPTOMS
SHORTNESS OF BREATH: 1
COUGH: 0
SORE THROAT: 0
BACK PAIN: 1
SINUS PRESSURE: 0
SINUS PAIN: 0
ABDOMINAL PAIN: 1
BLOOD IN STOOL: 0
EYE PAIN: 0
DIARRHEA: 0
NAUSEA: 0
VOMITING: 0
CONSTIPATION: 0
COLOR CHANGE: 1

## 2018-07-19 ENCOUNTER — HOSPITAL ENCOUNTER (OUTPATIENT)
Dept: WOUND CARE | Age: 63
Discharge: HOME | End: 2018-07-19
Payer: COMMERCIAL

## 2018-07-19 VITALS
TEMPERATURE: 98.4 F | SYSTOLIC BLOOD PRESSURE: 142 MMHG | RESPIRATION RATE: 16 BRPM | HEIGHT: 63 IN | DIASTOLIC BLOOD PRESSURE: 70 MMHG | BODY MASS INDEX: 49.61 KG/M2 | HEART RATE: 80 BPM | WEIGHT: 280 LBS

## 2018-07-19 DIAGNOSIS — I82.4Y9 DEEP VEIN THROMBOSIS (DVT) OF PROXIMAL LOWER EXTREMITY, UNSPECIFIED CHRONICITY, UNSPECIFIED LATERALITY (HCC): ICD-10-CM

## 2018-07-19 DIAGNOSIS — I73.9 PVD (PERIPHERAL VASCULAR DISEASE) (HCC): Primary | ICD-10-CM

## 2018-07-19 PROCEDURE — 87186 SC STD MICRODIL/AGAR DIL: CPT

## 2018-07-19 PROCEDURE — 87075 CULTR BACTERIA EXCEPT BLOOD: CPT

## 2018-07-19 PROCEDURE — 11042 DBRDMT SUBQ TIS 1ST 20SQCM/<: CPT

## 2018-07-19 PROCEDURE — 87070 CULTURE OTHR SPECIMN AEROBIC: CPT

## 2018-07-19 PROCEDURE — 87077 CULTURE AEROBIC IDENTIFY: CPT

## 2018-07-19 PROCEDURE — 11045 DBRDMT SUBQ TISS EACH ADDL: CPT

## 2018-07-19 PROCEDURE — 99214 OFFICE O/P EST MOD 30 MIN: CPT

## 2018-07-19 ASSESSMENT — PAIN DESCRIPTION - DESCRIPTORS: DESCRIPTORS: BURNING

## 2018-07-19 ASSESSMENT — PAIN DESCRIPTION - ORIENTATION: ORIENTATION: RIGHT;LEFT

## 2018-07-19 ASSESSMENT — PAIN DESCRIPTION - FREQUENCY: FREQUENCY: INTERMITTENT

## 2018-07-19 ASSESSMENT — PAIN DESCRIPTION - PROGRESSION: CLINICAL_PROGRESSION: NOT CHANGED

## 2018-07-19 ASSESSMENT — PAIN DESCRIPTION - ONSET: ONSET: ON-GOING

## 2018-07-19 ASSESSMENT — PAIN SCALES - GENERAL: PAINLEVEL_OUTOF10: 6

## 2018-07-19 ASSESSMENT — PAIN DESCRIPTION - LOCATION: LOCATION: LEG

## 2018-07-19 ASSESSMENT — PAIN DESCRIPTION - PAIN TYPE: TYPE: CHRONIC PAIN

## 2018-07-19 NOTE — PROGRESS NOTES
TAKE 1 TABLET BY MOUTH DAILY 90 tablet 1    gabapentin (NEURONTIN) 800 MG tablet Take 0.5 tablets by mouth 3 times daily for 30 days. . 90 tablet 0    diltiazem (CARDIZEM CD) 240 MG extended release capsule TAKE 1 CAPSULE BY MOUTH 2 TIMES DAILY 180 capsule 1    cyclobenzaprine (FLEXERIL) 10 MG tablet TAKE 1 TABLET BY MOUTH 3 TIMES DAILY AS NEEDED (MUSCLE PAIN) 90 tablet 5    ferrous sulfate (NAOMY-ALEC) 325 (65 Fe) MG tablet Take 1 tablet by mouth daily (with breakfast) 30 tablet 3    insulin glargine (LANTUS) 100 UNIT/ML injection vial Inject 110 Units into the skin nightly 60 mL 3    docusate sodium (COLACE) 100 MG capsule TAKE 1 CAPSULE BY MOUTH 3 TIMES DAILY 90 capsule 0    fluticasone (FLONASE) 50 MCG/ACT nasal spray USE 2 SPRAYS BY NASAL ROUTE DAILY 1 Bottle 5    [employer]S ALCOHOL SWABS PADS USE 3 TIMES A  each 5    oxyCODONE-acetaminophen (PERCOCET)  MG per tablet Take 1 tablet by mouth every 4 hours as needed for Pain.  OXYGEN Inhale 4 L into the lungs continuous       lansoprazole (PREVACID) 30 MG delayed release capsule TAKE 1 CAPSULE BY MOUTH DAILY 30 capsule 5    fenofibrate (TRICOR) 145 MG tablet TAKE 1 TABLET BY MOUTH DAILY 30 tablet 5    Elastic Bandages & Supports MISC 20-30 mmHg bilateral compression stockings  Size to fit  Dx:  Edema  Knee high 2 each 0    VENTOLIN  (90 Base) MCG/ACT inhaler INHALE 2 PUFFS INTO LUNGS EVERY 4 HOURS AS NEEDED FOR WHEEZING OR SHORTNESS OF BREATH 18 Inhaler 5    aspirin 81 MG EC tablet TAKE 1 TABLET BY MOUTH EVERY DAY 30 tablet 5    FREESTYLE LANCETS MISC USE AS DIRECTED 4 TIMES A  each 1    DULERA 200-5 MCG/ACT inhaler INHALE 2 PUFFS INTO LUNGS TWICE A DAY RINSE MOUTH AFTER USE 3 Inhaler 1    ipratropium-albuterol (DUONEB) 0.5-2.5 (3) MG/3ML SOLN nebulizer solution Inhale 3 mLs into the lungs every 4 hours.  360 mL 5    Insulin Syringe-Needle U-100 (B-D INS SYR ULTRAFINE 1CC/31G) 31G X 5/16\" 1 ML MISC USE AS DIRECTED 100 each 11     No current facility-administered medications on file prior to encounter. REVIEW OF SYSTEMS   ROS : All others Negative if blank [], Positive if [x]  General Vascular   [] Fevers [] Claudication   [] Chills [] Rest Pain   Skin Neurologic   [] Tissue Loss [] Lower extremity neuropathy     Objective:    BP (!) 142/70   Pulse 80   Temp 98.4 °F (36.9 °C)   Resp 16   Ht 5' 3\" (1.6 m)   Wt 280 lb (127 kg)   LMP  (LMP Unknown)   BMI 49.60 kg/m²   Wt Readings from Last 3 Encounters:   07/19/18 280 lb (127 kg)   07/16/18 289 lb (131.1 kg)   07/13/18 290 lb (131.5 kg)       PHYSICAL EXAM   CONSTITUTIONAL:   Awake, alert, cooperative   PSYCHIATRIC :  Oriented to time, place and person      normal insight to disease process  EXTREMITIES:   R LE Open wounds are noted   Skin color is abnormal with rubor   Edema is  noted   Sensation intact to light touch   Palpation of the foot does cause pain   5/5 strength DF/PF  L LE Open wounds are noted   Skin color is abnormal with rubor   Edema is  noted   Sensation intact to light touch   Palpation of the foot does cause pain   5/5 strength DF/PF  R dorsalis pedis 1 L dorsalis pedis 1   R posterior tibial 1 L posterior tibial 1     Assessment:     Problem List Items Addressed This Visit     None         Procedure Note  Indications:  Based on my examination of this patient's wound(s)/ulcer(s) today, debridement is required to promote healing and evaluate the wound base. Performed by: Lorenzo Medina DPM    Consent obtained:  Yes    Time out taken:  Yes    Pain Control: Anesthetic  Anesthetic: 2% Lidocaine Gel Topical     Debridement:Excisional Debridement    Using #15 blade scalpel the wound(s)/ulcer(s) was/were sharply debrided down through and including the removal of subcutaneous tissue.         Devitalized Tissue Debrided:  fibrin, biofilm and slough    Pre Debridement Measurements:  Are located in the [facility]  Documentation Flow Sheet    Wound/Ulcer #: Size (cm^2) (l*w) 69.6 cm^2 7/19/2018  3:46 PM   Wound Assessment Pink; White 7/19/2018  3:46 PM   Drainage Amount Large 7/19/2018  3:46 PM   Drainage Description Serous; Yellow 7/19/2018  3:46 PM   Odor None 7/19/2018  3:46 PM   Ely-wound Assessment Pink; Intact 7/19/2018  3:46 PM   Number of days: 0       Wound 07/19/18 Venous ulcer Leg Right;Medial;Lower #4 acq: 6-19-18 (Active)   Wound Image   7/19/2018  3:46 PM   Wound Diabetic Reeder 1 7/19/2018  3:46 PM   Wound Length (cm) 2 cm 7/19/2018  3:46 PM   Wound Width (cm) 1.5 cm 7/19/2018  3:46 PM   Wound Depth (cm)  0 7/19/2018  3:46 PM   Calculated Wound Size (cm^2) (l*w) 3 cm^2 7/19/2018  3:46 PM   Wound Assessment Pink; White 7/19/2018  3:46 PM   Drainage Amount Large 7/19/2018  3:46 PM   Drainage Description Serous; Yellow 7/19/2018  3:46 PM   Odor None 7/19/2018  3:46 PM   Ely-wound Assessment Pink; Intact 7/19/2018  3:46 PM   Number of days: 0       Percent of Wound/Ulcer Debrided: 100%    Total Surface Area Debrided:  177 sq cm     Estimated Blood Loss:  Minimal    Hemostasis Achieved:  by pressure    Procedural Pain:  2  / 10     Post Procedural Pain:  2 / 10     Response to treatment:  Well tolerated by patient. A culture was done. Plan:     Pt is a smoker   - Discussed relationship of smoking and negative affects on wound healing   - Emphasized importance of tobacco avoidace/cessation   - Script for nicotine patch given to patient   - Information regarding support groups for smoking cessation given    In my professional opinion and based off the information that is available at this time this patient has appropriate indication for HBO Therapy: Yes    Treatment Note please see attached Discharge Instructions    Written patient dismissal instructions given to patient and signed by patient or POA.            Electronically signed by Carlos Manuel Quinones DPM on 7/19/2018 at 4:10 PM

## 2018-07-21 LAB — ANAEROBIC CULTURE: NORMAL

## 2018-07-23 LAB
ORGANISM: ABNORMAL
WOUND/ABSCESS: ABNORMAL

## 2018-07-25 ENCOUNTER — TELEPHONE (OUTPATIENT)
Dept: FAMILY MEDICINE CLINIC | Age: 63
End: 2018-07-25

## 2018-07-25 ENCOUNTER — HOSPITAL ENCOUNTER (OUTPATIENT)
Dept: MRI IMAGING | Age: 63
Discharge: HOME | End: 2018-07-27
Payer: COMMERCIAL

## 2018-07-25 DIAGNOSIS — R22.42 MASS OF THIGH, LEFT: ICD-10-CM

## 2018-07-25 PROCEDURE — 73718 MRI LOWER EXTREMITY W/O DYE: CPT

## 2018-07-25 NOTE — TELEPHONE ENCOUNTER
Pt phoned has vaginal burning for couple days can  call something in, uses [address] [facility]

## 2018-07-26 ENCOUNTER — OFFICE VISIT (OUTPATIENT)
Dept: FAMILY MEDICINE CLINIC | Age: 63
End: 2018-07-26
Payer: COMMERCIAL

## 2018-07-26 ENCOUNTER — TELEPHONE (OUTPATIENT)
Dept: FAMILY MEDICINE CLINIC | Age: 63
End: 2018-07-26

## 2018-07-26 ENCOUNTER — HOSPITAL ENCOUNTER (OUTPATIENT)
Dept: WOUND CARE | Age: 63
Discharge: HOME | End: 2018-07-26
Payer: COMMERCIAL

## 2018-07-26 VITALS
WEIGHT: 291 LBS | SYSTOLIC BLOOD PRESSURE: 127 MMHG | BODY MASS INDEX: 49.68 KG/M2 | RESPIRATION RATE: 20 BRPM | HEIGHT: 64 IN | HEART RATE: 90 BPM | DIASTOLIC BLOOD PRESSURE: 67 MMHG | TEMPERATURE: 97.6 F

## 2018-07-26 VITALS
WEIGHT: 291 LBS | BODY MASS INDEX: 49.68 KG/M2 | TEMPERATURE: 100 F | DIASTOLIC BLOOD PRESSURE: 66 MMHG | HEART RATE: 92 BPM | RESPIRATION RATE: 20 BRPM | SYSTOLIC BLOOD PRESSURE: 128 MMHG | HEIGHT: 64 IN

## 2018-07-26 DIAGNOSIS — R22.42 MASS OF LEFT LOWER EXTREMITY: ICD-10-CM

## 2018-07-26 DIAGNOSIS — R30.0 BURNING WITH URINATION: ICD-10-CM

## 2018-07-26 DIAGNOSIS — B37.9 YEAST INFECTION: ICD-10-CM

## 2018-07-26 DIAGNOSIS — Z79.4 TYPE 2 DIABETES MELLITUS WITH DIABETIC NEUROPATHY, WITH LONG-TERM CURRENT USE OF INSULIN (HCC): Primary | ICD-10-CM

## 2018-07-26 DIAGNOSIS — G47.00 INSOMNIA, UNSPECIFIED TYPE: ICD-10-CM

## 2018-07-26 DIAGNOSIS — E11.40 TYPE 2 DIABETES MELLITUS WITH DIABETIC NEUROPATHY, WITH LONG-TERM CURRENT USE OF INSULIN (HCC): Primary | ICD-10-CM

## 2018-07-26 DIAGNOSIS — R93.89 ABNORMAL MRI: ICD-10-CM

## 2018-07-26 LAB
BILIRUBIN, POC: NEGATIVE
BLOOD URINE, POC: NEGATIVE
CLARITY, POC: NORMAL
COLOR, POC: YELLOW
GLUCOSE URINE, POC: 100
KETONES, POC: NEGATIVE
LEUKOCYTE EST, POC: NEGATIVE
NITRITE, POC: NEGATIVE
PH, POC: 5
PROTEIN, POC: NEGATIVE
SPECIFIC GRAVITY, POC: 1.03
UROBILINOGEN, POC: 0.2

## 2018-07-26 PROCEDURE — 81002 URINALYSIS NONAUTO W/O SCOPE: CPT | Performed by: FAMILY MEDICINE

## 2018-07-26 PROCEDURE — 1036F TOBACCO NON-USER: CPT | Performed by: FAMILY MEDICINE

## 2018-07-26 PROCEDURE — 11042 DBRDMT SUBQ TIS 1ST 20SQCM/<: CPT

## 2018-07-26 PROCEDURE — G8598 ASA/ANTIPLAT THER USED: HCPCS | Performed by: FAMILY MEDICINE

## 2018-07-26 PROCEDURE — G8427 DOCREV CUR MEDS BY ELIG CLIN: HCPCS | Performed by: FAMILY MEDICINE

## 2018-07-26 PROCEDURE — 99214 OFFICE O/P EST MOD 30 MIN: CPT | Performed by: FAMILY MEDICINE

## 2018-07-26 PROCEDURE — 3045F PR MOST RECENT HEMOGLOBIN A1C LEVEL 7.0-9.0%: CPT | Performed by: FAMILY MEDICINE

## 2018-07-26 PROCEDURE — 3017F COLORECTAL CA SCREEN DOC REV: CPT | Performed by: FAMILY MEDICINE

## 2018-07-26 PROCEDURE — G8417 CALC BMI ABV UP PARAM F/U: HCPCS | Performed by: FAMILY MEDICINE

## 2018-07-26 PROCEDURE — 11045 DBRDMT SUBQ TISS EACH ADDL: CPT

## 2018-07-26 PROCEDURE — 2022F DILAT RTA XM EVC RTNOPTHY: CPT | Performed by: FAMILY MEDICINE

## 2018-07-26 RX ORDER — AMPICILLIN 500 MG/1
500 CAPSULE ORAL 3 TIMES DAILY
Qty: 30 CAPSULE | Refills: 0 | Status: SHIPPED | OUTPATIENT
Start: 2018-07-26 | End: 2018-08-05

## 2018-07-26 RX ORDER — GABAPENTIN 400 MG/1
400 CAPSULE ORAL 3 TIMES DAILY
COMMUNITY
End: 2018-08-02 | Stop reason: SDUPTHER

## 2018-07-26 RX ORDER — GENTAMICIN SULFATE 1 MG/G
OINTMENT TOPICAL
Qty: 30 G | Refills: 3 | Status: SHIPPED | OUTPATIENT
Start: 2018-07-26 | End: 2018-08-02

## 2018-07-26 RX ORDER — ZOLPIDEM TARTRATE 10 MG/1
10 TABLET ORAL NIGHTLY PRN
Qty: 30 TABLET | Refills: 0 | Status: SHIPPED | OUTPATIENT
Start: 2018-07-26 | End: 2018-08-25

## 2018-07-26 RX ORDER — FLUCONAZOLE 150 MG/1
150 TABLET ORAL ONCE
Qty: 2 TABLET | Refills: 0 | Status: SHIPPED | OUTPATIENT
Start: 2018-07-26 | End: 2018-07-26 | Stop reason: ALTCHOICE

## 2018-07-26 ASSESSMENT — ENCOUNTER SYMPTOMS
BACK PAIN: 1
SINUS PAIN: 0
COLOR CHANGE: 0
SORE THROAT: 0
ABDOMINAL PAIN: 1
COUGH: 0
DIARRHEA: 0
BLOOD IN STOOL: 0
NAUSEA: 0
CONSTIPATION: 0
SHORTNESS OF BREATH: 0
VOMITING: 0
EYE PAIN: 0
SINUS PRESSURE: 0

## 2018-07-26 NOTE — PLAN OF CARE
Problem: Pain:  Goal: Pain level will decrease  Pain level will decrease   Outcome: Ongoing    Goal: Control of acute pain  Control of acute pain   Outcome: Ongoing    Goal: Control of chronic pain  Control of chronic pain   Outcome: Ongoing      Problem: Wound:  Goal: Will show signs of wound healing; wound closure and no evidence of infection  Will show signs of wound healing; wound closure and no evidence of infection  Outcome: Ongoing      Problem: Compression therapy:  Goal: Will be free from complications associated with compression therapy  Will be free from complications associated with compression therapy  Outcome: Ongoing      Problem: Falls - Risk of:  Goal: Will remain free from falls  Will remain free from falls  Outcome: Ongoing      Problem: Blood Glucose:  Goal: Ability to maintain appropriate glucose levels will improve  Ability to maintain appropriate glucose levels will improve  Outcome: Ongoing

## 2018-07-26 NOTE — PROGRESS NOTES
History:   Diagnosis Date    Anal fissure     Anemia 10/6/2017    Arthritis     Asthma     Atrial fibrillation ([address]     Blood transfusion     long time no reaction    CAD (coronary artery disease)     Disc disorder     Fall due to stumbling 10/6/2017    GERD (gastroesophageal reflux disease)     GERD (gastroesophageal reflux disease)     Hyperlipidemia     Hypertension     Inappropriate sinus tachycardia     Low hemoglobin 4/13/2018    LVH (left ventricular hypertrophy)     moderate    Lymphadenitis, chronic 4/13/2018    Occipital neuralgia 11/2/2011    Respiratory acidosis 10/7/2017    Sinus tachycardia 2/16/2015    Type II or unspecified type diabetes mellitus without mention of complication, not stated as uncontrolled        Past Surgical History:   Procedure Laterality Date    ANOSCOPY  10/25/2006    injection of anal sphincter with Botox, Franklyn Ortiz/Timmy, [facility]    ANOSCOPY  4/9/2007    injection of anal sphincter with Botox, Dr. Jose Harrison, [address] [place] [address] ANOSCOPY  6/13/2007    injection of anal sphincter with Botox, Franklyn Sanford [facility]    ANUS SURGERY  4/4/2006    [facility]  4/[facility]    anal exam and injection of Botox 100 units into anal sphincter, Laila Sanford, [facility]    APPENDECTOMY  1967   [address]    COLONOSCOPY  1/20/2004    cecal polyp, bx (no pathologic changes), Dr. Екатерина Burns, [address] [place] [address] COLONOSCOPY  8/11/2006    snare polypectomy terminal ileum polyp (granulation tissue polyp) and anal polyp (inflammatory/papilla), Dr. Jose Harrison, [address] [facility] COLONOSCOPY  3/23/2012    bx/cauterization proximal ascending colon polyp, injection of anal sphincter with Botox, Dr. Jose Harrison, [address] ENDOSCOPY  1/20/2004    gastritis, bx (no H pylori), Dr. Екатерина Burns, [facility] 5/16\" 1 ML MISC USE AS DIRECTED 100 each 11    meloxicam (MOBIC) 15 MG tablet Take 1 tablet by mouth daily 7 tablet 0    metoprolol (LOPRESSOR) 100 MG tablet TAKE 1 TABLET BY MOUTH 2 TIMES DAILY 180 tablet 1    montelukast (SINGULAIR) 10 MG tablet TAKE 1 TABLET BY MOUTH NIGHTLY 90 tablet 1    spironolactone (ALDACTONE) 50 MG tablet TAKE 1/2 TABLET BY MOUTH DAILY  5    nystatin (MYCOSTATIN) 573492 UNIT/GM cream Apply topically 2 times daily. 60 g 2    cephALEXin (KEFLEX) 250 MG capsule Take 1 capsule by mouth 3 times daily 21 capsule 0    escitalopram (LEXAPRO) 20 MG tablet TAKE 1 TABLET BY MOUTH DAILY 90 tablet 1    diltiazem (CARDIZEM CD) 240 MG extended release capsule TAKE 1 CAPSULE BY MOUTH 2 TIMES DAILY 180 capsule 1    cyclobenzaprine (FLEXERIL) 10 MG tablet TAKE 1 TABLET BY MOUTH 3 TIMES DAILY AS NEEDED (MUSCLE PAIN) 90 tablet 5    ferrous sulfate (NAOMY-ALEC) 325 (65 Fe) MG tablet Take 1 tablet by mouth daily (with breakfast) 30 tablet 3    insulin glargine (LANTUS) 100 UNIT/ML injection vial Inject 110 Units into the skin nightly 60 mL 3    docusate sodium (COLACE) 100 MG capsule TAKE 1 CAPSULE BY MOUTH 3 TIMES DAILY 90 capsule 0    fluticasone (FLONASE) 50 MCG/ACT nasal spray USE 2 SPRAYS BY NASAL ROUTE DAILY 1 Bottle 5    [employer]S ALCOHOL SWABS PADS USE 3 TIMES A  each 5    oxyCODONE-acetaminophen (PERCOCET)  MG per tablet Take 1 tablet by mouth every 4 hours as needed for Pain.       OXYGEN Inhale 4 L into the lungs continuous       lansoprazole (PREVACID) 30 MG delayed release capsule TAKE 1 CAPSULE BY MOUTH DAILY 30 capsule 5    fenofibrate (TRICOR) 145 MG tablet TAKE 1 TABLET BY MOUTH DAILY 30 tablet 5    Elastic Bandages & Supports MISC 20-30 mmHg bilateral compression stockings  Size to fit  Dx:  Edema  Knee high 2 each 0    VENTOLIN  (90 Base) MCG/ACT inhaler INHALE 2 PUFFS INTO LUNGS EVERY 4 HOURS AS NEEDED FOR WHEEZING OR SHORTNESS OF BREATH 18 Inhaler 5    Psychiatric/Behavioral: Negative for behavioral problems and dysphoric mood. The patient is not nervous/anxious. PHYSICAL EXAM  /67   Pulse 90   Temp 97.6 °F (36.4 °C) (Oral)   Resp 20   Ht 5' 3.5\" (1.613 m)   Wt 291 lb (132 kg)   LMP  (LMP Unknown)   BMI 50.74 kg/m²   Physical Exam   Constitutional: She is oriented to person, place, and time. She appears well-developed and well-nourished. No distress. Uses walker   HENT:   Head: Normocephalic and atraumatic. Right Ear: External ear normal.   Left Ear: External ear normal.   Nose: Nose normal.   Mouth/Throat: Oropharynx is clear and moist.   Eyes: Conjunctivae and EOM are normal. No scleral icterus. Neck: Neck supple. No thyromegaly present. Cardiovascular: Normal rate, regular rhythm and normal heart sounds. No murmur heard. Pulmonary/Chest: Effort normal and breath sounds normal. No respiratory distress. She has no wheezes. Abdominal: Soft. There is no tenderness. Genitourinary: Vagina normal. There is rash on the left labia. Musculoskeletal: Normal range of motion. She exhibits edema (legs are wrapped currently still edematous). Edema noted bilaterally in the thigh   Lymphadenopathy:     She has no cervical adenopathy. Neurological: She is alert and oriented to person, place, and time. Skin: Skin is warm and dry. No rash noted. Psychiatric: She has a normal mood and affect. Her behavior is normal. Judgment and thought content normal.   Nursing note and vitals reviewed. Laboratory:   All laboratory and radiology results have been personally reviewed by myself    Lab Results   Component Value Date     07/10/2018    K 4.0 07/10/2018    K 5.1 04/16/2018    CL 95 07/10/2018    CO2 31 07/10/2018    BUN 33 07/10/2018    CREATININE 0.9 07/10/2018    PROT 6.2 06/03/2018    LABALBU 4.1 06/03/2018    LABALBU 4.5 11/02/2011    CALCIUM 9.9 07/10/2018    GFRAA >60 07/10/2018    LABGLOM >60 07/10/2018    GLUCOSE 145

## 2018-07-26 NOTE — PROGRESS NOTES
Botox, Franklyn Rodriguez, [facility]    ANUS SURGERY  4/4/2006    Lateral sphincterotomy for chronic anal fissure, Dr. Oneil Osei [facility]  4/18/2012    anal exam and injection of Botox 100 units into anal sphincter, Laila Rodriguez, [facility]    APPENDECTOMY  1967   [address]    COLONOSCOPY  1/20/2004    cecal polyp, bx (no pathologic changes), Dr. Екатерина Burns, [facility] COLONOSCOPY  8/11/2006    snare polypectomy terminal ileum polyp (granulation tissue polyp) and anal polyp (inflammatory/papilla), Dr. Jose Harrison, [address] [facility] COLONOSCOPY  3/23/2012    bx/cauterization proximal ascending colon polyp, injection of anal sphincter with Botox, Dr. Jose Harrison, [address] REPLACEMENT  2003    [address]    UPPER GASTROINTESTINAL ENDOSCOPY  1/20/2004    gastritis, bx (no H pylori), Dr. Екатерина Burns, [address] ENDOSCOPY N/A 6/1/2018    EGD BIOPSY performed by Nuris Pedraza MD at [facility] ENDOSCOPY     Family History   Problem Relation Age of Onset    Heart Disease Mother     Diabetes Father     Colon Cancer Father     Other Father         BLINDNESS    Colon Cancer Sister     Diabetes Paternal Aunt     Diabetes Paternal Uncle     Diabetes Paternal Aunt     Diabetes Paternal Uncle      Social History   Substance Use Topics    Smoking status: Former Smoker     Packs/day: 1.50     Years: 25.00     Types: Cigarettes     Quit date: 12/18/2004    Smokeless tobacco: Never Used    Alcohol use No      Comment: denies caffeine     Allergies   Allergen Reactions    Statins Other (See Comments)     Muscle pains     Current Outpatient Prescriptions on File Prior to Encounter   Medication Sig Dispense Refill    fexofenadine (ALLEGRA) 180 MG tablet TAKE 1 TABLET BY MOUTH DAILY 30 tablet 2    apixaban (ELIQUIS STARTER PACK) 5 MG TABS tablet Take 10 mg (2 tablets) orally twice daily for 7 days, then take 5 mg (1 compression stockings  Size to fit  Dx:  Edema  Knee high 2 each 0    VENTOLIN  (90 Base) MCG/ACT inhaler INHALE 2 PUFFS INTO LUNGS EVERY 4 HOURS AS NEEDED FOR WHEEZING OR SHORTNESS OF BREATH 18 Inhaler 5    aspirin 81 MG EC tablet TAKE 1 TABLET BY MOUTH EVERY DAY 30 tablet 5    FREESTYLE LANCETS MISC USE AS DIRECTED 4 TIMES A  each 1    DULERA 200-5 MCG/ACT inhaler INHALE 2 PUFFS INTO LUNGS TWICE A DAY RINSE MOUTH AFTER USE 3 Inhaler 1    ipratropium-albuterol (DUONEB) 0.5-2.5 (3) MG/3ML SOLN nebulizer solution Inhale 3 mLs into the lungs every 4 hours. 360 mL 5    gabapentin (NEURONTIN) 800 MG tablet Take 0.5 tablets by mouth 3 times daily for 30 days. . 90 tablet 0     No current facility-administered medications on file prior to encounter. REVIEW OF SYSTEMS See HPI    Objective:    /66   Pulse 92   Temp 100 °F (37.8 °C) (Oral)   Resp 20   Ht 5' 3.5\" (1.613 m)   Wt 291 lb (132 kg)   LMP  (LMP Unknown)   BMI 50.74 kg/m²   Wt Readings from Last 3 Encounters:   07/26/18 291 lb (132 kg)   07/26/18 291 lb (132 kg)   07/19/18 280 lb (127 kg)     PHYSICAL EXAM  CONSTITUTIONAL:   Awake, alert, cooperative   EYES:  lids and lashes normal   ENT: external ears and nose without lesions   NECK:  supple, symmetrical, trachea midline   SKIN:  Open wound Present    Assessment:     Problem List Items Addressed This Visit     None        Procedure Note  Indications:  Based on my examination of this patient's wound(s)/ulcer(s) today, debridement is required to promote healing and evaluate the wound base. Performed by: Radha Baez DPM    Consent obtained:  Yes    Time out taken:  Yes    Pain Control: Anesthetic  Anesthetic: 2% Lidocaine Gel Topical     Debridement:Excisional Debridement    Using #15 blade scalpel the wound(s)/ulcer(s) was/were sharply debrided down through and including the removal of subcutaneous tissue.         Devitalized Tissue Debrided:  fibrin, biofilm, slough Wound Length (cm) 10.2 cm 7/26/2018  3:58 PM   Wound Width (cm) 4.5 cm 7/26/2018  3:58 PM   Wound Depth (cm)  0.1 7/26/2018  3:58 PM   Calculated Wound Size (cm^2) (l*w) 45.9 cm^2 7/26/2018  3:58 PM   Change in Wound Size % (l*w) -61.05 7/26/2018  3:58 PM   Wound Assessment Pink;Red;Yellow; White 7/26/2018  3:28 PM   Drainage Amount Large 7/26/2018  3:28 PM   Drainage Description Green;Serous; Yellow 7/26/2018  3:28 PM   Odor None 7/26/2018  3:28 PM   Ely-wound Assessment Pink 7/26/2018  3:28 PM   Time out Yes 7/26/2018  3:58 PM   Number of days: 7       Wound 07/19/18 Venous ulcer Leg Left; Lower; Posterior #3 blocked  acq: 6-19-18 (Active)   Wound Image   7/19/2018  3:46 PM   Wound Type Wound 7/19/2018  3:46 PM   Wound Diabetic Reeder 1 7/19/2018  3:46 PM   Dressing Status Clean;Dry 7/19/2018  4:19 PM   Dressing Changed Changed/New 7/19/2018  4:19 PM   Dressing/Treatment Alginate;ABD 7/19/2018  4:19 PM   Wound Cleansed Rinsed/Irrigated with saline; Wound cleanser 7/19/2018  4:19 PM   Wound Length (cm) 12.1 cm 7/26/2018  3:58 PM   Wound Width (cm) 12.2 cm 7/26/2018  3:58 PM   Wound Depth (cm)  0.1 7/26/2018  3:58 PM   Calculated Wound Size (cm^2) (l*w) 147.62 cm^2 7/26/2018  3:58 PM   Change in Wound Size % (l*w) -112.1 7/26/2018  3:58 PM   Wound Assessment Pink;Red;Yellow 7/26/2018  3:28 PM   Drainage Amount Large 7/26/2018  3:28 PM   Drainage Description Green 7/26/2018  3:28 PM   Odor None 7/26/2018  3:28 PM   Ely-wound Assessment Pink 7/26/2018  3:28 PM   Time out Yes 7/26/2018  3:58 PM   Number of days: 7       Wound 07/19/18 Venous ulcer Leg Right;Medial;Lower #4 acq: 6-19-18 (Active)   Wound Image   7/19/2018  3:46 PM   Wound Diabetic Reeder 1 7/19/2018  3:46 PM   Wound Length (cm) 1.8 cm 7/26/2018  3:58 PM   Wound Width (cm) 1.5 cm 7/26/2018  3:58 PM   Wound Depth (cm)  0.1 7/26/2018  3:58 PM   Calculated Wound Size (cm^2) (l*w) 2.7 cm^2 7/26/2018  3:58 PM   Change in Wound Size % (l*w) 10 7/26/2018  3:58 PM Wound Assessment Pink;Red; White 7/26/2018  3:28 PM   Drainage Amount Large 7/26/2018  3:28 PM   Drainage Description Green;Yellow 7/26/2018  3:28 PM   Odor None 7/26/2018  3:28 PM   Ely-wound Assessment Pink 7/26/2018  3:28 PM   Time out Yes 7/26/2018  3:58 PM   Number of days: 7     Percent of Wound/Ulcer Debrided: 100%    Total Surface Area Debrided:  292 sq cm     Estimated Blood Loss:  Minimal  Hemostasis Achieved:  by pressure    Procedural Pain:  4  / 10   Post Procedural Pain:  5 / 10     Response to treatment:  Well tolerated by patient. Called patients doctor concerning noninvassive test to be performed or not due to blood clot, awaiting response! Plan:   Treatment Note please see attached Discharge Instructions    Written patient dismissal instructions given to patient and signed by patient or POA. Discharge Instructions       Visit Discharge/Physician Orders     Discharge condition: Stable     Assessment of pain at discharge:minimal     Anesthetic used: 2% lidocaine     Discharge to: Home     Left via:Private automobile     Accompanied by: accompanied by self     ECF/HHA:  [address]     Dressing Orders:CLEANSE ULCERS LEFT AND RIGHT LOWER EXTREMITIES WITH NORMAL SALINE, APPLY GENTAMYCIN OINTMENTALGINATE AG, DRY DRESSING AND SECURE 3 TIMES A WEEK, SINGLE TUBIGRIP TO BILATERAL LOWER LEGS.     Treatment Orders:  CULTURE REVIEWED   PATIENT TO START AMPICILLIN 500 MG EVERY 8 HOURS X 10 DAYS  EAT DIET HIGH IN PROTEIN AND VITAMIN C     TAKE A MULTIVITAMIN DAIILY IF OKAY WITH PRIMARY CARE.     ARTERIAL, VENOUS STUDIES TO BE SCHEDULED.      [facility] followup visit ____________1WEEK___DR BATES______________  (Please note your next appointment above and if you are unable to keep, kindly give a 24 hour notice.  Thank you.)     Physician signature:__________________________        If you experience any of the following, please call the [facility] during business hours:     * Increase in

## 2018-07-31 ENCOUNTER — TELEPHONE (OUTPATIENT)
Dept: FAMILY MEDICINE CLINIC | Age: 63
End: 2018-07-31

## 2018-08-01 ENCOUNTER — OFFICE VISIT (OUTPATIENT)
Dept: FAMILY MEDICINE CLINIC | Age: 63
End: 2018-08-01
Payer: COMMERCIAL

## 2018-08-01 VITALS
BODY MASS INDEX: 50.02 KG/M2 | RESPIRATION RATE: 18 BRPM | DIASTOLIC BLOOD PRESSURE: 64 MMHG | OXYGEN SATURATION: 90 % | HEART RATE: 94 BPM | SYSTOLIC BLOOD PRESSURE: 126 MMHG | WEIGHT: 293 LBS | HEIGHT: 64 IN

## 2018-08-01 DIAGNOSIS — I82.5Z1 CHRONIC DEEP VEIN THROMBOSIS (DVT) OF DISTAL VEIN OF RIGHT LOWER EXTREMITY (HCC): ICD-10-CM

## 2018-08-01 DIAGNOSIS — Z79.4 TYPE 2 DIABETES MELLITUS WITH DIABETIC NEUROPATHY, WITH LONG-TERM CURRENT USE OF INSULIN (HCC): Primary | ICD-10-CM

## 2018-08-01 DIAGNOSIS — R93.89 ABNORMAL MRI: ICD-10-CM

## 2018-08-01 DIAGNOSIS — E11.40 TYPE 2 DIABETES MELLITUS WITH DIABETIC NEUROPATHY, WITH LONG-TERM CURRENT USE OF INSULIN (HCC): Primary | ICD-10-CM

## 2018-08-01 DIAGNOSIS — I89.0 LYMPHEDEMA OF BOTH LOWER EXTREMITIES: ICD-10-CM

## 2018-08-01 PROBLEM — E87.5 HYPERKALEMIA: Status: RESOLVED | Noted: 2018-05-26 | Resolved: 2018-08-01

## 2018-08-01 PROBLEM — N17.9 AKI (ACUTE KIDNEY INJURY) (HCC): Status: RESOLVED | Noted: 2017-10-06 | Resolved: 2018-08-01

## 2018-08-01 PROBLEM — E87.5 HYPERKALEMIA: Status: RESOLVED | Noted: 2017-10-06 | Resolved: 2018-08-01

## 2018-08-01 PROBLEM — D64.9 ANEMIA: Status: RESOLVED | Noted: 2018-04-13 | Resolved: 2018-08-01

## 2018-08-01 PROBLEM — I50.32 CHRONIC DIASTOLIC CONGESTIVE HEART FAILURE (HCC): Status: ACTIVE | Noted: 2018-01-15

## 2018-08-01 PROCEDURE — 1036F TOBACCO NON-USER: CPT | Performed by: FAMILY MEDICINE

## 2018-08-01 PROCEDURE — G8417 CALC BMI ABV UP PARAM F/U: HCPCS | Performed by: FAMILY MEDICINE

## 2018-08-01 PROCEDURE — 99214 OFFICE O/P EST MOD 30 MIN: CPT | Performed by: FAMILY MEDICINE

## 2018-08-01 PROCEDURE — 3045F PR MOST RECENT HEMOGLOBIN A1C LEVEL 7.0-9.0%: CPT | Performed by: FAMILY MEDICINE

## 2018-08-01 PROCEDURE — G8598 ASA/ANTIPLAT THER USED: HCPCS | Performed by: FAMILY MEDICINE

## 2018-08-01 PROCEDURE — 2022F DILAT RTA XM EVC RTNOPTHY: CPT | Performed by: FAMILY MEDICINE

## 2018-08-01 PROCEDURE — 3017F COLORECTAL CA SCREEN DOC REV: CPT | Performed by: FAMILY MEDICINE

## 2018-08-01 PROCEDURE — G8427 DOCREV CUR MEDS BY ELIG CLIN: HCPCS | Performed by: FAMILY MEDICINE

## 2018-08-01 ASSESSMENT — ENCOUNTER SYMPTOMS
SHORTNESS OF BREATH: 0
BACK PAIN: 1
COLOR CHANGE: 0
EYE PAIN: 0
VOMITING: 0
NAUSEA: 0
CONSTIPATION: 0
COUGH: 0
SINUS PRESSURE: 0
SINUS PAIN: 0
BLOOD IN STOOL: 0
ABDOMINAL PAIN: 1
SORE THROAT: 0
DIARRHEA: 0

## 2018-08-01 NOTE — PROGRESS NOTES
Chani Kirkland  : 1955    Chief Complaint:     Chief Complaint   Patient presents with    Lesion(s)       HPI  Chani Kirkland 57 y.o. presents for   Chief Complaint   Patient presents with    Lesion(s)     Abnormal MRI   She had a MRI of her left femur due to abnormality in the US of the left leg. The MRI did result in multiple sprains/edema of muscles as well as possible tear of the hamstring tendon. She also had fluid collection in the lateral part of her thigh concerning for [facility] collection. She as well had a mass in the medial portion of the thigh likely representing a hematoma but cannot rule out a mass. This study was limited due to body habitus. Spoke with orthopedic physician, he recommends referral to plastic surgeon for evaluation. Dvt/diabetes/lymphedema  She has been on starter pack of eliquis. She has been tolerating this well and denies any recent falls. She is still following with wound care and CHF clinic. Her legs are wrapped currently. She was to have arterial studies completed of LE but given recent DVT these will wait at least 6 weeks from initial finding of dvt and treatment with eliquis. Her insurance did approve her units of insulin as she has been taking this on a daily basis and A1c under good control. All questions were answered to patients satisfaction.     Past Medical History:   Diagnosis Date    Anal fissure     Anemia 10/6/2017    Arthritis     Asthma     Atrial fibrillation ([address]     Blood transfusion     long time no reaction    CAD (coronary artery disease)     Disc disorder     Fall due to stumbling 10/6/2017    GERD (gastroesophageal reflux disease)     GERD (gastroesophageal reflux disease)     Hyperlipidemia     Hypertension     Inappropriate sinus tachycardia     Low hemoglobin 2018    LVH (left ventricular hypertrophy)     moderate    Lymphadenitis, chronic 2018    Occipital neuralgia 2011    Respiratory acidosis 10/7/2017    Sinus tachycardia 2/16/2015    Type II or unspecified type diabetes mellitus without mention of complication, not stated as uncontrolled        Past Surgical History:   Procedure Laterality Date    ANOSCOPY  10/25/2006    injection of anal sphincter with Botox, Franklyn Ortiz/Timmy, [facility]    ANOSCOPY  4/9/2007    injection of anal sphincter with Botox, Dr. Deandre Jordan, [address] [place] [address] ANOSCOPY  6/13/2007    injection of anal sphincter with Botox, Franklyn Au [facility]    ANUS SURGERY  4/4/2006    Lateral sphincterotomy for chronic anal fissure, Dr. Rafael Irene, [facility]  4/18/2012    anal exam and injection of Botox 100 units into anal sphincter, Laila Au, [facility]    APPENDECTOMY  1965   [address]    COLONOSCOPY  1/20/2004    cecal polyp, bx (no pathologic changes), Dr. Johana Choe, [address] [place] [address] COLONOSCOPY  8/11/2006    snare polypectomy terminal ileum polyp (granulation tissue polyp) and anal polyp (inflammatory/papilla), Dr. Deandre Jordan, [address] [place] [address] COLONOSCOPY  3/23/2012    bx/cauterization proximal ascending colon polyp, injection of anal sphincter with Botox, Dr. Deandre Jordan, [place] [id number]    [id number] ENDOSCOPY  1/20/2004    gastritis, bx (no H pylori), Dr. Johana Choe, [address] ENDOSCOPY N/A 6/1/2018    EGD BIOPSY performed by Dena Womack MD at  [address] Marital status: Single     Spouse name: N/A    Number of children: 3    Years of education: 9     Occupational History    SSD      Social History Main Topics    Smoking status: Former Smoker     Packs/day: 1.50     Years: 25.00     Types: Cigarettes     Quit date: 12/18/2004    Smokeless tobacco: Never Used    Alcohol use No      Comment: denies caffeine    Drug use: No    Sexual activity: Not Currently     Other Topics taking.     Reviewed age and gender appropriate health screening exams and vaccinations. Advised patient regarding importance of keeping up with recommended health maintenance and to schedule as soon as possible if overdue, as this is important in assessing for undiagnosed pathology, especially cancer, as well as protecting against potentially harmful/life threatening disease.          Patient and/or guardian verbalizes understanding and agrees with above counseling, assessment and plan.     All questions answered. Naseem Link, [address], DO  8/1/18    NOTE: This report was transcribed using voice recognition software.  Every effort was made to ensure accuracy; however, inadvertent computerized transcription errors may be present

## 2018-08-02 ENCOUNTER — HOSPITAL ENCOUNTER (OUTPATIENT)
Dept: WOUND CARE | Age: 63
Discharge: HOME | End: 2018-08-02
Payer: COMMERCIAL

## 2018-08-02 VITALS
HEART RATE: 68 BPM | HEIGHT: 64 IN | RESPIRATION RATE: 18 BRPM | BODY MASS INDEX: 49.68 KG/M2 | SYSTOLIC BLOOD PRESSURE: 122 MMHG | TEMPERATURE: 98.2 F | WEIGHT: 291 LBS | DIASTOLIC BLOOD PRESSURE: 62 MMHG

## 2018-08-02 PROCEDURE — 11042 DBRDMT SUBQ TIS 1ST 20SQCM/<: CPT

## 2018-08-02 PROCEDURE — 11045 DBRDMT SUBQ TISS EACH ADDL: CPT

## 2018-08-02 NOTE — PROGRESS NOTES
[facility]aling Center Followup Visit Note    Referring Physician : Yareli Seymour DO  Soo Blanc  MEDICAL RECORD NUMBER:  [id number]  AGE: 57 y.o. GENDER: female  : 1955  EPISODE DATE:  2018    Subjective:     Chief Complaint   Patient presents with    Wound Check     bilateral legs      HISTORY of PRESENT ILLNESS MONROE Blanc is a 57 y.o. female who presents today for wound/ulcer evaluation. History of Wound Context:   2 weeks    Wound/Ulcer Pain Timing/Severity: intermittent  Quality of pain: aching  Severity:  2  10   Modifying Factors: Pain worsens with walking  Associated Signs/Symptoms: edema, erythema and drainage    Ulcer Identification:  Ulcer Type: venous  Contributing Factors: edema, venous stasis and lymphedema    Diabetic/Pressure/Non Pressure Ulcers only:  Ulcer: Non-Pressure ulcer, fat layer exposed    Wound: N/A        PAST MEDICAL HISTORY      Diagnosis Date    Anal fissure     Anemia 10/6/2017    Arthritis     Asthma     Atrial fibrillation ([facility] [address]     Blood transfusion     long time no reaction    CAD (coronary artery disease)     Disc disorder     Fall due to stumbling 10/6/2017    GERD (gastroesophageal reflux disease)     GERD (gastroesophageal reflux disease)     Hyperlipidemia     Hypertension     Inappropriate sinus tachycardia     Low hemoglobin 2018    LVH (left ventricular hypertrophy)     moderate    Lymphadenitis, chronic 2018    Occipital neuralgia 2011    Respiratory acidosis 10/7/2017    Sinus tachycardia 2015    Type II or unspecified type diabetes mellitus without mention of complication, not stated as uncontrolled      Past Surgical History:   Procedure Laterality Date    ANOSCOPY  10/25/2006    injection of anal sphincter with Botox, Franklyn Ortiz/Timmy, [facility]    ANOSCOPY  2007    injection of anal sphincter with Botox, Dr. Jahaira Salcedo, [address] [place] [address] ANOSCOPY  2007    injection of anal sphincter with Botox, Drs. INTO LUNGS EVERY 4 HOURS AS NEEDED FOR WHEEZING OR SHORTNESS OF BREATH 18 Inhaler 5    DULERA 200-5 MCG/ACT inhaler INHALE 2 PUFFS INTO LUNGS TWICE A DAY RINSE MOUTH AFTER USE 3 Inhaler 1    ipratropium-albuterol (DUONEB) 0.5-2.5 (3) MG/3ML SOLN nebulizer solution Inhale 3 mLs into the lungs every 4 hours. 360 mL 5    blood glucose test strips (FREESTYLE LITE) strip USE TO TEST BLOOD SUGAR FOUR TIMES DAILY 100 strip 5    Insulin Syringe-Needle U-100 (B-D INS SYR ULTRAFINE 1CC/31G) 31G X 5/16\" 1 ML MISC USE AS DIRECTED 100 each 11    gabapentin (NEURONTIN) 800 MG tablet Take 0.5 tablets by mouth 3 times daily for 30 days. . 90 tablet 0    [employer] ALCOHOL SWABS PADS USE 3 TIMES A  each 5    Elastic Bandages & Supports MISC 20-30 mmHg bilateral compression stockings  Size to fit  Dx:  Edema  Knee high 2 each 0    FREESTYLE LANCETS MISC USE AS DIRECTED 4 TIMES A  each 1     No current facility-administered medications on file prior to encounter. REVIEW OF SYSTEMS See HPI    Objective:    /62   Pulse 68   Temp 98.2 °F (36.8 °C) (Oral)   Resp 18   Ht 5' 3.5\" (1.613 m)   Wt 291 lb (132 kg)   LMP  (LMP Unknown)   BMI 50.74 kg/m²   Wt Readings from Last 3 Encounters:   08/02/18 291 lb (132 kg)   08/01/18 294 lb (133.4 kg)   07/26/18 291 lb (132 kg)     PHYSICAL EXAM  CONSTITUTIONAL:   Awake, alert, cooperative   EYES:  lids and lashes normal   ENT: external ears and nose without lesions   NECK:  supple, symmetrical, trachea midline   SKIN:  Open wound Present    Assessment:     Problem List Items Addressed This Visit     None        Procedure Note  Indications:  Based on my examination of this patient's wound(s)/ulcer(s) today, debridement is required to promote healing and evaluate the wound base.     Performed by: Senait Serra DPM    Consent obtained:  Yes    Time out taken:  Yes    Pain Control: Anesthetic  Anesthetic: 2% Lidocaine Gel Topical     Debridement:Excisional

## 2018-08-02 NOTE — PLAN OF CARE
Problem: Pain:  Goal: Pain level will decrease  Pain level will decrease   Outcome: Ongoing    Goal: Control of acute pain  Control of acute pain   Outcome: Ongoing    Goal: Control of chronic pain  Control of chronic pain   Outcome: Ongoing      Problem: Wound:  Goal: Will show signs of wound healing; wound closure and no evidence of infection  Will show signs of wound healing; wound closure and no evidence of infection   Outcome: Ongoing      Problem: Compression therapy:  Goal: Will be free from complications associated with compression therapy  Will be free from complications associated with compression therapy   Outcome: Ongoing      Problem: Blood Glucose:  Goal: Ability to maintain appropriate glucose levels will improve  Ability to maintain appropriate glucose levels will improve   Outcome: Ongoing      Problem: Falls - Risk of:  Goal: Will remain free from falls  Will remain free from falls   Outcome: Ongoing

## 2018-08-03 ENCOUNTER — TELEPHONE (OUTPATIENT)
Dept: FAMILY MEDICINE CLINIC | Age: 63
End: 2018-08-03

## 2018-08-09 ENCOUNTER — HOSPITAL ENCOUNTER (OUTPATIENT)
Dept: WOUND CARE | Age: 63
Discharge: HOME | End: 2018-08-09
Payer: COMMERCIAL

## 2018-08-09 VITALS
BODY MASS INDEX: 49.68 KG/M2 | TEMPERATURE: 98.3 F | WEIGHT: 291 LBS | DIASTOLIC BLOOD PRESSURE: 74 MMHG | HEIGHT: 64 IN | RESPIRATION RATE: 18 BRPM | HEART RATE: 84 BPM | SYSTOLIC BLOOD PRESSURE: 124 MMHG

## 2018-08-09 PROCEDURE — 11045 DBRDMT SUBQ TISS EACH ADDL: CPT

## 2018-08-09 PROCEDURE — 11042 DBRDMT SUBQ TIS 1ST 20SQCM/<: CPT

## 2018-08-09 NOTE — PROGRESS NOTES
Wound Healing Center Followup Visit Note    Referring Physician : Margarita Hough DO  Bettye Lozano  MEDICAL RECORD NUMBER:  [id number]  AGE: 57 y.o. GENDER: female  : 1955  EPISODE DATE:  2018    Subjective:     Chief Complaint   Patient presents with    Wound Check     bilateral lower legs      HISTORY of PRESENT ILLNESS HPI   Bettye Lozano is a 57 y.o. female who presents today for wound/ulcer evaluation. History of Wound Context:  3 weeks     Wound/Ulcer Pain Timing/Severity: constant  Quality of pain: sharp  Severity:  2 / 10   Modifying Factors: Pain worsens with walking  Associated Signs/Symptoms: edema, erythema and drainage    Ulcer Identification:  Ulcer Type: venous  Contributing Factors: edema and venous stasis    Diabetic/Pressure/Non Pressure Ulcers only:  Ulcer: Non-Pressure ulcer, fat layer exposed    Wound: N/A        PAST MEDICAL HISTORY      Diagnosis Date    Anal fissure     Anemia 10/6/2017    Arthritis     Asthma     Atrial fibrillation ([facility] [address]     Blood transfusion     long time no reaction    CAD (coronary artery disease)     Disc disorder     Fall due to stumbling 10/6/2017    GERD (gastroesophageal reflux disease)     GERD (gastroesophageal reflux disease)     Hyperlipidemia     Hypertension     Inappropriate sinus tachycardia     Low hemoglobin 2018    LVH (left ventricular hypertrophy)     moderate    Lymphadenitis, chronic 2018    Occipital neuralgia 2011    Respiratory acidosis 10/7/2017    Sinus tachycardia 2015    Type II or unspecified type diabetes mellitus without mention of complication, not stated as uncontrolled      Past Surgical History:   Procedure Laterality Date    ANOSCOPY  10/25/2006    injection of anal sphincter with Botox, Franklyn Ortiz/Timmy, [facility]    ANOSCOPY  2007    injection of anal sphincter with Botox, Dr. Shea Vizcarra, [address] [place] [address] ANOSCOPY  2007    injection of anal sphincter with Botox, Franklyn Ortiz/Timmy, (HUMALOG) 100 UNIT/ML injection vial TAKE 90 UNITS WITH BREAKFAST. TAKE 92 UNITS WITH LUNCH. TAKE 94 UNITS WITH DINNER. 900 mL 5    fexofenadine (ALLEGRA) 180 MG tablet TAKE 1 TABLET BY MOUTH DAILY 30 tablet 2    torsemide (DEMADEX) 20 MG tablet TAKE 1 TABLET BY MOUTH EVERY DAY 30 tablet 0    blood glucose test strips (FREESTYLE LITE) strip USE TO TEST BLOOD SUGAR FOUR TIMES DAILY 100 strip 5    Insulin Syringe-Needle U-100 (B-D INS SYR ULTRAFINE 1CC/31G) 31G X 5/16\" 1 ML MISC USE AS DIRECTED 100 each 11    metoprolol (LOPRESSOR) 100 MG tablet TAKE 1 TABLET BY MOUTH 2 TIMES DAILY 180 tablet 1    montelukast (SINGULAIR) 10 MG tablet TAKE 1 TABLET BY MOUTH NIGHTLY 90 tablet 1    spironolactone (ALDACTONE) 50 MG tablet TAKE 1/2 TABLET BY MOUTH DAILY  5    nystatin (MYCOSTATIN) 069290 UNIT/GM cream Apply topically 2 times daily. 60 g 2    escitalopram (LEXAPRO) 20 MG tablet TAKE 1 TABLET BY MOUTH DAILY 90 tablet 1    diltiazem (CARDIZEM CD) 240 MG extended release capsule TAKE 1 CAPSULE BY MOUTH 2 TIMES DAILY 180 capsule 1    cyclobenzaprine (FLEXERIL) 10 MG tablet TAKE 1 TABLET BY MOUTH 3 TIMES DAILY AS NEEDED (MUSCLE PAIN) 90 tablet 5    ferrous sulfate (NAOMY-ALEC) 325 (65 Fe) MG tablet Take 1 tablet by mouth daily (with breakfast) 30 tablet 3    insulin glargine (LANTUS) 100 UNIT/ML injection vial Inject 110 Units into the skin nightly 60 mL 3    docusate sodium (COLACE) 100 MG capsule TAKE 1 CAPSULE BY MOUTH 3 TIMES DAILY 90 capsule 0    fluticasone (FLONASE) 50 MCG/ACT nasal spray USE 2 SPRAYS BY NASAL ROUTE DAILY 1 Bottle 5    [employer]S ALCOHOL SWABS PADS USE 3 TIMES A  each 5    oxyCODONE-acetaminophen (PERCOCET)  MG per tablet Take 1 tablet by mouth every 4 hours as needed for Pain.       OXYGEN Inhale 4 L into the lungs continuous       lansoprazole (PREVACID) 30 MG delayed release capsule TAKE 1 CAPSULE BY MOUTH DAILY 30 capsule 5    fenofibrate (TRICOR) 145 MG tablet TAKE 1 TABLET BY biofilm, slough and necrotic/eschar to stimulate bleeding to promote healing, post debridement good bleeding base and wound edges noted    Pre Debridement Measurements:  Are located in the [facility]  Documentation Flow Sheet    Wound/Ulcer #: 1, 2 and 3    Post Debridement Measurements:  Wound/Ulcer Descriptions are Pre Debridement except measurements:    Wound 07/19/18 Venous ulcer Pretibial Right #1 blocked acq: 6-19-18 (Active)   Wound Image   7/19/2018  3:46 PM   Wound Type Wound 7/19/2018  3:46 PM   Wound Diabetic Reeder 1 7/19/2018  3:46 PM   Dressing Status Clean;Dry; Intact 7/26/2018  4:20 PM   Dressing Changed Changed/New 7/26/2018  4:20 PM   Dressing/Treatment Dry dressing 7/26/2018  4:20 PM   Wound Cleansed Rinsed/Irrigated with saline 7/26/2018  4:20 PM   Wound Length (cm) 8.1 cm 8/9/2018  4:10 PM   Wound Width (cm) 10.2 cm 8/9/2018  4:10 PM   Wound Depth (cm)  0.1 8/9/2018  4:10 PM   Calculated Wound Size (cm^2) (l*w) 82.62 cm^2 8/9/2018  4:10 PM   Change in Wound Size % (l*w) -35 8/9/2018  4:10 PM   Wound Assessment Pink; Mai 8/9/2018  4:10 PM   Drainage Amount Large 8/9/2018  4:10 PM   Drainage Description Serous; Yellow 8/9/2018  4:10 PM   Odor None 8/9/2018  4:10 PM   Ely-wound Assessment Pink; Maceration 8/9/2018  4:10 PM   Time out Yes 7/26/2018  3:58 PM   Number of days: 21       Wound 07/19/18 Venous ulcer Pretibial Left #2 blocked acq:  6-19-18 (Active)   Wound Image   7/19/2018  3:46 PM   Wound Type Wound 7/19/2018  3:46 PM   Wound Diabetic Reeder 1 7/19/2018  3:46 PM   Dressing Status Clean;Dry; Intact 7/26/2018  4:20 PM   Dressing Changed Changed/New 7/26/2018  4:20 PM   Dressing/Treatment Dry dressing 7/26/2018  4:20 PM   Wound Cleansed Rinsed/Irrigated with saline 7/26/2018  4:20 PM   Wound Length (cm) 9.9 cm 8/9/2018  4:10 PM   Wound Width (cm) 4.4 cm 8/9/2018  4:10 PM   Wound Depth (cm)  0.1 8/9/2018  4:10 PM   Calculated Wound Size (cm^2) (l*w) 43.56 cm^2 8/9/2018  4:10 PM   Change in 8/2/2018  4:27 PM   Drainage Amount Large 8/2/2018  4:27 PM   Drainage Description Brown;Green;Tan;Yellow 8/2/2018  4:27 PM   Odor None 7/26/2018  3:28 PM   Ely-wound Assessment Maceration;Pink 8/2/2018  4:27 PM   Time out Yes 7/26/2018  3:58 PM   Number of days: 21       Wound 08/02/18 Venous ulcer Leg Right; Lower; Posterior wound # 5 right post calf, acquired 7-30-18  (#4 in iheal) (Active)   Wound Type Wound 8/9/2018  4:10 PM   Wound Venous 8/9/2018  4:10 PM   Wound Length (cm) 4.3 cm 8/9/2018  4:10 PM   Wound Width (cm) 3.6 cm 8/9/2018  4:10 PM   Wound Depth (cm)  0.1 8/9/2018  4:10 PM   Calculated Wound Size (cm^2) (l*w) 15.48 cm^2 8/9/2018  4:10 PM   Change in Wound Size % (l*w) 12.05 8/9/2018  4:10 PM   Wound Assessment Yellow;Pink 8/9/2018  4:10 PM   Drainage Amount Large 8/9/2018  4:10 PM   Drainage Description Yellow;Serous 8/9/2018  4:10 PM   Odor None 8/9/2018  4:10 PM   Ely-wound Assessment Pink; Maceration 8/9/2018  4:10 PM   Number of days: 7     Percent of Wound/Ulcer Debrided: 100%    Total Surface Area Debrided:  212 sq cm     Estimated Blood Loss:  Minimal  Hemostasis Achieved:  by pressure    Procedural Pain:  2  / 10   Post Procedural Pain:  4 / 10     Response to treatment:  Well tolerated by patient. Plan:   Treatment Note please see attached Discharge Instructions    Written patient dismissal instructions given to patient and signed by patient or POA.          Discharge Instructions       Visit Discharge/Physician Orders     Discharge condition: Stable     Assessment of pain at discharge:minimal     Anesthetic used: 2% lidocaine     Discharge to: Home     Left via:Private automobile     Accompanied by: accompanied by self     ECF/HHA: [place] [id number].     Dressing Orders:CLEANSE ULCERS LEFT AND RIGHT LOWER EXTREMITIES WITH NORMAL SALINE, APPLY GENTAMYCIN OINTMENT, (CONTINUE GENTAMYCIN FOR ANOTHER WEEK) ALGINATE AG, DRY DRESSING AND SECURE 3 TIMES A WEEK, SINGLE TUBIGRIP TO

## 2018-08-10 DIAGNOSIS — G89.4 CHRONIC PAIN SYNDROME: ICD-10-CM

## 2018-08-10 RX ORDER — GABAPENTIN 800 MG/1
400 TABLET ORAL 3 TIMES DAILY
Qty: 45 TABLET | Refills: 0 | Status: SHIPPED | OUTPATIENT
Start: 2018-08-10 | End: 2018-09-08 | Stop reason: SDUPTHER

## 2018-08-16 ENCOUNTER — HOSPITAL ENCOUNTER (OUTPATIENT)
Dept: WOUND CARE | Age: 63
Discharge: HOME | End: 2018-08-16
Payer: COMMERCIAL

## 2018-08-16 VITALS
WEIGHT: 291 LBS | HEART RATE: 92 BPM | SYSTOLIC BLOOD PRESSURE: 126 MMHG | BODY MASS INDEX: 50.74 KG/M2 | TEMPERATURE: 98.2 F | RESPIRATION RATE: 18 BRPM | DIASTOLIC BLOOD PRESSURE: 76 MMHG

## 2018-08-16 PROCEDURE — 87070 CULTURE OTHR SPECIMN AEROBIC: CPT

## 2018-08-16 PROCEDURE — 87077 CULTURE AEROBIC IDENTIFY: CPT

## 2018-08-16 PROCEDURE — 87186 SC STD MICRODIL/AGAR DIL: CPT

## 2018-08-16 PROCEDURE — 87075 CULTR BACTERIA EXCEPT BLOOD: CPT

## 2018-08-16 PROCEDURE — 87147 CULTURE TYPE IMMUNOLOGIC: CPT

## 2018-08-16 PROCEDURE — 11045 DBRDMT SUBQ TISS EACH ADDL: CPT

## 2018-08-16 PROCEDURE — 11042 DBRDMT SUBQ TIS 1ST 20SQCM/<: CPT

## 2018-08-16 NOTE — PROGRESS NOTES
[facility] Followup Visit Note    Referring Physician : Lulu Gaspar DO  Mushtaq Massey  MEDICAL RECORD NUMBER:  [id number]  AGE: 57 y.o. GENDER: female  : 1955  EPISODE DATE:  2018    Subjective:     Chief Complaint   Patient presents with    Wound Check     both legs      HISTORY of PRESENT ILLNESS HPI   Mushtaq Massey is a 57 y.o. female who presents today for wound/ulcer evaluation. History of Wound Context:  3 week     Wound/Ulcer Pain Timing/Severity: constant  Quality of pain: aching  Severity:  2 / 10   Modifying Factors: Pain worsens with walking  Associated Signs/Symptoms: edema, erythema and drainage    Ulcer Identification:  Ulcer Type: venous  Contributing Factors: edema, venous stasis and lymphedema    Diabetic/Pressure/Non Pressure Ulcers only:  Ulcer: Non-Pressure ulcer, fat layer exposed    Wound: N/A        PAST MEDICAL HISTORY      Diagnosis Date    Anal fissure     Anemia 10/6/2017    Arthritis     Asthma     Atrial fibrillation ([address]     Blood transfusion     long time no reaction    CAD (coronary artery disease)     Disc disorder     Fall due to stumbling 10/6/2017    GERD (gastroesophageal reflux disease)     GERD (gastroesophageal reflux disease)     Hyperlipidemia     Hypertension     Inappropriate sinus tachycardia     Low hemoglobin 2018    LVH (left ventricular hypertrophy)     moderate    Lymphadenitis, chronic 2018    Occipital neuralgia 2011    Respiratory acidosis 10/7/2017    Sinus tachycardia 2015    Type II or unspecified type diabetes mellitus without mention of complication, not stated as uncontrolled      Past Surgical History:   Procedure Laterality Date    ANOSCOPY  10/25/2006    injection of anal sphincter with Botox, Franklyn Ortiz/Timmy, [facility]    ANOSCOPY  2007    injection of anal sphincter with Botox, Dr. Montes , [address] [place] [address] ANOSCOPY  2007    injection of anal sphincter with Botox, Franklyn Ortiz/Timmy, [facility]    ANUS SURGERY  4/4/2006    [facility]  4/18/2012    anal exam and injection of Botox 100 units into anal sphincter, Laila Pack, [facility]    APPENDECTOMY  1965   [address]    COLONOSCOPY  1/20/2004    cecal polyp, bx (no pathologic changes), Dr. Sandhya Dexter, [facility] COLONOSCOPY  8/11/2006    snare polypectomy terminal ileum polyp (granulation tissue polyp) and anal polyp (inflammatory/papilla), Dr. Poppy Guaman, [facility] COLONOSCOPY  3/23/2012    bx/cauterization proximal ascending colon polyp, injection of anal sphincter with Botox, Dr. Poppy Guaman, [address] REPLACEMENT  2003    [address]    UPPER GASTROINTESTINAL ENDOSCOPY  1/20/2004    gastritis, bx (no H pylori), Dr. Sandhya Dexter, [address] ENDOSCOPY N/A 6/1/2018    EGD BIOPSY performed by Joy Lazo MD at [facility] ENDOSCOPY     Family History   Problem Relation Age of Onset    Heart Disease Mother     Diabetes Father     Colon Cancer Father     Other Father         BLINDNESS    Colon Cancer Sister     Diabetes Paternal Aunt     Diabetes Paternal Uncle     Diabetes Paternal Aunt     Diabetes Paternal Uncle      Social History   Substance Use Topics    Smoking status: Former Smoker     Packs/day: 1.50     Years: 25.00     Types: Cigarettes     Quit date: 12/18/2004    Smokeless tobacco: Never Used    Alcohol use No      Comment: denies caffeine     Allergies   Allergen Reactions    Statins Other (See Comments)     Muscle pains     Current Outpatient Prescriptions on File Prior to Encounter   Medication Sig Dispense Refill    gabapentin (NEURONTIN) 800 MG tablet Take 0.5 tablets by mouth 3 times daily for 30 days. . 45 tablet 0    apixaban (ELIQUIS) 5 MG TABS tablet Take 1 tablet by mouth 2 times daily 60 tablet 2    zolpidem (AMBIEN) 10 MG tablet Take 1 tablet by mouth nightly as needed for Sleep for up to 30 days. . 30 tablet 0    insulin lispro (HUMALOG) 100 UNIT/ML injection vial TAKE 90 UNITS WITH BREAKFAST. TAKE 92 UNITS WITH LUNCH. TAKE 94 UNITS WITH DINNER. 900 mL 5    fexofenadine (ALLEGRA) 180 MG tablet TAKE 1 TABLET BY MOUTH DAILY 30 tablet 2    torsemide (DEMADEX) 20 MG tablet TAKE 1 TABLET BY MOUTH EVERY DAY 30 tablet 0    blood glucose test strips (FREESTYLE LITE) strip USE TO TEST BLOOD SUGAR FOUR TIMES DAILY 100 strip 5    Insulin Syringe-Needle U-100 (B-D INS SYR ULTRAFINE 1CC/31G) 31G X 5/16\" 1 ML MISC USE AS DIRECTED 100 each 11    metoprolol (LOPRESSOR) 100 MG tablet TAKE 1 TABLET BY MOUTH 2 TIMES DAILY 180 tablet 1    montelukast (SINGULAIR) 10 MG tablet TAKE 1 TABLET BY MOUTH NIGHTLY 90 tablet 1    spironolactone (ALDACTONE) 50 MG tablet TAKE 1/2 TABLET BY MOUTH DAILY  5    nystatin (MYCOSTATIN) 409336 UNIT/GM cream Apply topically 2 times daily. 60 g 2    escitalopram (LEXAPRO) 20 MG tablet TAKE 1 TABLET BY MOUTH DAILY 90 tablet 1    diltiazem (CARDIZEM CD) 240 MG extended release capsule TAKE 1 CAPSULE BY MOUTH 2 TIMES DAILY 180 capsule 1    cyclobenzaprine (FLEXERIL) 10 MG tablet TAKE 1 TABLET BY MOUTH 3 TIMES DAILY AS NEEDED (MUSCLE PAIN) 90 tablet 5    ferrous sulfate (NAOMY-ALEC) 325 (65 Fe) MG tablet Take 1 tablet by mouth daily (with breakfast) 30 tablet 3    insulin glargine (LANTUS) 100 UNIT/ML injection vial Inject 110 Units into the skin nightly 60 mL 3    docusate sodium (COLACE) 100 MG capsule TAKE 1 CAPSULE BY MOUTH 3 TIMES DAILY 90 capsule 0    fluticasone (FLONASE) 50 MCG/ACT nasal spray USE 2 SPRAYS BY NASAL ROUTE DAILY 1 Bottle 5    [employer] ALCOHOL SWABS PADS USE 3 TIMES A  each 5    oxyCODONE-acetaminophen (PERCOCET)  MG per tablet Take 1 tablet by mouth every 4 hours as needed for Pain.       OXYGEN Inhale 4 L into the lungs continuous       lansoprazole (PREVACID) 30 MG delayed release capsule TAKE 1 CAPSULE BY MOUTH DAILY 30 capsule 5    fenofibrate (TRICOR) 145 MG tablet TAKE 1 TABLET BY MOUTH DAILY 30 tablet 5    Elastic Bandages & Supports MISC 20-30 mmHg bilateral compression stockings  Size to fit  Dx:  Edema  Knee high 2 each 0    VENTOLIN  (90 Base) MCG/ACT inhaler INHALE 2 PUFFS INTO LUNGS EVERY 4 HOURS AS NEEDED FOR WHEEZING OR SHORTNESS OF BREATH 18 Inhaler 5    FREESTYLE LANCETS MISC USE AS DIRECTED 4 TIMES A  each 1    DULERA 200-5 MCG/ACT inhaler INHALE 2 PUFFS INTO LUNGS TWICE A DAY RINSE MOUTH AFTER USE 3 Inhaler 1    ipratropium-albuterol (DUONEB) 0.5-2.5 (3) MG/3ML SOLN nebulizer solution Inhale 3 mLs into the lungs every 4 hours. 360 mL 5     No current facility-administered medications on file prior to encounter. REVIEW OF SYSTEMS See HPI    Objective:    /76   Pulse 92   Temp 98.2 °F (36.8 °C) (Oral)   Resp 18   Wt 291 lb (132 kg)   LMP  (LMP Unknown)   BMI 50.74 kg/m²   Wt Readings from Last 3 Encounters:   08/16/18 291 lb (132 kg)   08/09/18 291 lb (132 kg)   08/02/18 291 lb (132 kg)     PHYSICAL EXAM  CONSTITUTIONAL:   Awake, alert, cooperative   EYES:  lids and lashes normal   ENT: external ears and nose without lesions   NECK:  supple, symmetrical, trachea midline   SKIN:  Open wound Present    Assessment:     Problem List Items Addressed This Visit     None        Procedure Note  Indications:  Based on my examination of this patient's wound(s)/ulcer(s) today, debridement is required to promote healing and evaluate the wound base. Performed by: Noe Malcolm DPM    Consent obtained:  Yes    Time out taken:  Yes    Pain Control: Anesthetic  Anesthetic: 2% Lidocaine Gel Topical     Debridement:Excisional Debridement    Using #15 blade scalpel the wound(s)/ulcer(s) was/were sharply debrided down through and including the removal of subcutaneous tissue.         Devitalized Tissue Debrided:  fibrin, biofilm and slough to stimulate bleeding to promote healing, post debridement good bleeding base and wound edges noted    Pre Debridement Measurements:  Are located in the Wound/Ulcer Documentation Flow Sheet    Wound/Ulcer #: 1, 2, 3 and 4    Post Debridement Measurements:  Wound/Ulcer Descriptions are Pre Debridement except measurements:    Wound 07/19/18 Venous ulcer Pretibial Right #1 blocked acq: 6-19-18 (Active)   Wound Image   7/19/2018  3:46 PM   Wound Type Wound 7/19/2018  3:46 PM   Wound Diabetic Reeder 1 7/19/2018  3:46 PM   Dressing Status Clean;Dry; Intact 8/9/2018  4:52 PM   Dressing Changed Changed/New 8/9/2018  4:52 PM   Dressing/Treatment Dry dressing;Silver dressing 8/9/2018  4:52 PM   Wound Cleansed Rinsed/Irrigated with saline 8/9/2018  4:52 PM   Wound Length (cm) 8 cm 8/16/2018  3:37 PM   Wound Width (cm) 9.5 cm 8/16/2018  3:37 PM   Wound Depth (cm)  .1 8/16/2018  3:37 PM   Calculated Wound Size (cm^2) (l*w) 76 cm^2 8/16/2018  3:37 PM   Change in Wound Size % (l*w) -24.18 8/16/2018  3:37 PM   Wound Assessment Pink;Tan;Yellow 8/16/2018  3:37 PM   Drainage Amount Large 8/16/2018  3:37 PM   Drainage Description Yellow 8/16/2018  3:37 PM   Odor None 8/16/2018  3:37 PM   Ely-wound Assessment Pink; Maceration 8/16/2018  3:37 PM   Time out Yes 8/9/2018  4:47 PM   Number of days: 28       Wound 07/19/18 Venous ulcer Pretibial Left #2 blocked acq:  6-19-18 (Active)   Wound Image   7/19/2018  3:46 PM   Wound Type Wound 7/19/2018  3:46 PM   Wound Diabetic Reeder 1 7/19/2018  3:46 PM   Dressing Status Clean;Dry; Intact 8/9/2018  4:52 PM   Dressing Changed Changed/New 8/9/2018  4:52 PM   Dressing/Treatment Silver dressing 8/9/2018  4:52 PM   Wound Cleansed Rinsed/Irrigated with saline 8/9/2018  4:52 PM   Wound Length (cm) 10.4 cm 8/16/2018  3:37 PM   Wound Width (cm) 4.8 cm 8/16/2018  3:37 PM   Wound Depth (cm)  .1 8/16/2018  3:37 PM   Calculated Wound Size (cm^2) (l*w) 49.92 cm^2 8/16/2018  3:37 PM   Change in Wound Size % (l*w) -75.16 8/16/2018  3:37 PM   Wound Assessment Pink;Red;Yellow 8/16/2018  3:37 PM   Drainage Amount Large 8/16/2018  3:37 PM   Drainage Description Yellow 8/16/2018  3:37 PM   Odor None 8/16/2018  3:37 PM   Ely-wound Assessment Pink; Maceration 8/16/2018  3:37 PM   Time out Yes 8/9/2018  4:47 PM   Number of days: 28       Wound 07/19/18 Venous ulcer Leg Left; Lower; Posterior #3 blocked  acq: 6-19-18 (Active)   Wound Image   7/19/2018  3:46 PM   Wound Type Wound 7/19/2018  3:46 PM   Wound Diabetic Reeder 1 7/19/2018  3:46 PM   Dressing Status Clean;Dry; Intact 8/9/2018  4:52 PM   Dressing Changed Changed/New 8/9/2018  4:52 PM   Dressing/Treatment Silver dressing 8/9/2018  4:52 PM   Wound Cleansed Rinsed/Irrigated with saline 8/9/2018  4:52 PM   Wound Length (cm) 9.2 cm 8/16/2018  3:37 PM   Wound Width (cm) 8.8 cm 8/16/2018  3:37 PM   Wound Depth (cm)  .1 8/16/2018  3:37 PM   Calculated Wound Size (cm^2) (l*w) 80.96 cm^2 8/16/2018  3:37 PM   Change in Wound Size % (l*w) -16.32 8/16/2018  3:37 PM   Wound Assessment Pink;Red;Yellow 8/16/2018  3:37 PM   Drainage Amount Large 8/16/2018  3:37 PM   Drainage Description Yellow 8/16/2018  3:37 PM   Odor None 8/16/2018  3:37 PM   Ely-wound Assessment Pink; Maceration 8/16/2018  3:37 PM   Time out Yes 8/9/2018  4:47 PM   Number of days: 28       Wound 07/19/18 Venous ulcer Leg Right;Medial;Lower #4 acq: 6-19-18 (Active)   Wound Image   7/19/2018  3:46 PM   Wound Diabetic Reeder 1 7/19/2018  3:46 PM   Dressing Status Clean; Intact;Dry 7/26/2018  4:20 PM   Dressing Changed Changed/New 7/26/2018  4:20 PM   Dressing/Treatment Dry dressing 7/26/2018  4:20 PM   Wound Cleansed Wound cleanser 8/2/2018  4:27 PM   Wound Length (cm) 0.5 cm 8/2/2018  4:50 PM   Wound Width (cm) 1.6 cm 8/2/2018  4:50 PM   Wound Depth (cm)  0 8/2/2018  4:50 PM   Calculated Wound Size (cm^2) (l*w) 0.8 cm^2 8/2/2018  4:50 PM   Change in Wound Size % (l*w) 73.33 8/2/2018  4:50 PM   Wound Assessment Pink 8/2/2018 4:27 PM   Drainage Amount Large 8/2/2018  4:27 PM   Drainage Description Brown;Green;Tan;Yellow 8/2/2018  4:27 PM   Odor None 7/26/2018  3:28 PM   Ely-wound Assessment Maceration;Pink 8/2/2018  4:27 PM   Time out Yes 7/26/2018  3:58 PM   Number of days: 28       Wound 08/02/18 Venous ulcer Leg Right; Lower; Posterior wound # 5 right post calf, acquired 7-30-18  (#4 in iheal) (Active)   Wound Type Wound 8/9/2018  4:10 PM   Wound Venous 8/9/2018  4:10 PM   Dressing Status Clean;Dry; Intact 8/9/2018  4:52 PM   Dressing Changed Changed/New 8/9/2018  4:52 PM   Dressing/Treatment Dry dressing;Silver dressing 8/9/2018  4:52 PM   Wound Cleansed Rinsed/Irrigated with saline 8/9/2018  4:52 PM   Wound Length (cm) 4.5 cm 8/16/2018  3:37 PM   Wound Width (cm) 3.5 cm 8/16/2018  3:37 PM   Wound Depth (cm)  .1 8/16/2018  3:37 PM   Calculated Wound Size (cm^2) (l*w) 15.75 cm^2 8/16/2018  3:37 PM   Change in Wound Size % (l*w) 10.51 8/16/2018  3:37 PM   Wound Assessment Pink;Yellow 8/16/2018  3:37 PM   Drainage Amount Large 8/16/2018  3:37 PM   Drainage Description Yellow 8/16/2018  3:37 PM   Odor None 8/16/2018  3:37 PM   Ely-wound Assessment Pink; Maceration 8/16/2018  3:37 PM   Time out Yes 8/9/2018  4:47 PM   Number of days: 13     Percent of Wound/Ulcer Debrided: 100%    Total Surface Area Debrided:  221 sq cm     Estimated Blood Loss:  Minimal  Hemostasis Achieved:  by pressure    Procedural Pain:  2  / 10   Post Procedural Pain:  3 / 10     Response to treatment:  Well tolerated by patient. Plan:   Treatment Note please see attached Discharge Instructions    Written patient dismissal instructions given to patient and signed by patient or POA.          Discharge Instructions       Visit Discharge/Physician Orders     Discharge condition: Stable     Assessment of pain at discharge:minimal     Anesthetic used: 2% lidocaine     Discharge to: Home     Left via:Private automobile     Accompanied by: accompanied by self     ECF/HHA:

## 2018-08-19 LAB — ANAEROBIC CULTURE: NORMAL

## 2018-08-20 ENCOUNTER — HOSPITAL ENCOUNTER (OUTPATIENT)
Dept: OTHER | Age: 63
Setting detail: THERAPIES SERIES
Discharge: HOME | End: 2018-08-20
Payer: COMMERCIAL

## 2018-08-21 LAB
ORGANISM: ABNORMAL
WOUND/ABSCESS: ABNORMAL

## 2018-08-22 ENCOUNTER — TELEPHONE (OUTPATIENT)
Dept: FAMILY MEDICINE CLINIC | Age: 63
End: 2018-08-22

## 2018-08-22 NOTE — PROGRESS NOTES
Therese Gentile, From Dr. Neetu Isaac' office returned my call. Therese Gentile stated that \"Dr. Chele Dennis said it wound be ok to increase compression. \"  Will inform Dr. Rin Hickman tomorrow.  Jitendra Karimi

## 2018-08-23 ENCOUNTER — HOSPITAL ENCOUNTER (OUTPATIENT)
Dept: WOUND CARE | Age: 63
Discharge: HOME | End: 2018-08-23
Payer: COMMERCIAL

## 2018-08-30 ENCOUNTER — HOSPITAL ENCOUNTER (OUTPATIENT)
Dept: WOUND CARE | Age: 63
Discharge: HOME | End: 2018-08-30
Payer: COMMERCIAL

## 2018-08-30 VITALS
BODY MASS INDEX: 48.48 KG/M2 | WEIGHT: 291 LBS | RESPIRATION RATE: 20 BRPM | TEMPERATURE: 98.2 F | SYSTOLIC BLOOD PRESSURE: 130 MMHG | HEIGHT: 65 IN | HEART RATE: 106 BPM | DIASTOLIC BLOOD PRESSURE: 60 MMHG

## 2018-08-30 PROCEDURE — 11042 DBRDMT SUBQ TIS 1ST 20SQCM/<: CPT

## 2018-08-30 PROCEDURE — 11045 DBRDMT SUBQ TISS EACH ADDL: CPT

## 2018-08-30 RX ORDER — AMPICILLIN 500 MG/1
500 CAPSULE ORAL 3 TIMES DAILY
COMMUNITY
End: 2018-09-13 | Stop reason: ALTCHOICE

## 2018-08-30 RX ORDER — AMPICILLIN 500 MG/1
500 CAPSULE ORAL 3 TIMES DAILY
Qty: 30 CAPSULE | Refills: 0 | Status: SHIPPED | OUTPATIENT
Start: 2018-08-30 | End: 2018-09-09

## 2018-08-30 ASSESSMENT — PAIN SCALES - GENERAL: PAINLEVEL_OUTOF10: 0

## 2018-08-30 NOTE — PROGRESS NOTES
times daily for 30 days. . 45 tablet 0    apixaban (ELIQUIS) 5 MG TABS tablet Take 1 tablet by mouth 2 times daily 60 tablet 2    insulin lispro (HUMALOG) 100 UNIT/ML injection vial TAKE 90 UNITS WITH BREAKFAST. TAKE 92 UNITS WITH LUNCH. TAKE 94 UNITS WITH DINNER. 900 mL 5    fexofenadine (ALLEGRA) 180 MG tablet TAKE 1 TABLET BY MOUTH DAILY 30 tablet 2    blood glucose test strips (FREESTYLE LITE) strip USE TO TEST BLOOD SUGAR FOUR TIMES DAILY 100 strip 5    Insulin Syringe-Needle U-100 (B-D INS SYR ULTRAFINE 1CC/31G) 31G X 5/16\" 1 ML MISC USE AS DIRECTED 100 each 11    metoprolol (LOPRESSOR) 100 MG tablet TAKE 1 TABLET BY MOUTH 2 TIMES DAILY 180 tablet 1    montelukast (SINGULAIR) 10 MG tablet TAKE 1 TABLET BY MOUTH NIGHTLY 90 tablet 1    spironolactone (ALDACTONE) 50 MG tablet TAKE 1/2 TABLET BY MOUTH DAILY  5    nystatin (MYCOSTATIN) 590397 UNIT/GM cream Apply topically 2 times daily. 60 g 2    escitalopram (LEXAPRO) 20 MG tablet TAKE 1 TABLET BY MOUTH DAILY 90 tablet 1    diltiazem (CARDIZEM CD) 240 MG extended release capsule TAKE 1 CAPSULE BY MOUTH 2 TIMES DAILY 180 capsule 1    cyclobenzaprine (FLEXERIL) 10 MG tablet TAKE 1 TABLET BY MOUTH 3 TIMES DAILY AS NEEDED (MUSCLE PAIN) 90 tablet 5    ferrous sulfate (NAOMY-ALEC) 325 (65 Fe) MG tablet Take 1 tablet by mouth daily (with breakfast) 30 tablet 3    insulin glargine (LANTUS) 100 UNIT/ML injection vial Inject 110 Units into the skin nightly 60 mL 3    docusate sodium (COLACE) 100 MG capsule TAKE 1 CAPSULE BY MOUTH 3 TIMES DAILY 90 capsule 0    fluticasone (FLONASE) 50 MCG/ACT nasal spray USE 2 SPRAYS BY NASAL ROUTE DAILY 1 Bottle 5    [employer]S ALCOHOL SWABS PADS USE 3 TIMES A  each 5    oxyCODONE-acetaminophen (PERCOCET)  MG per tablet Take 1 tablet by mouth every 4 hours as needed for Pain.       OXYGEN Inhale 4 L into the lungs continuous       lansoprazole (PREVACID) 30 MG delayed release capsule TAKE 1 CAPSULE BY MOUTH DAILY 30 edges noted    Pre Debridement Measurements:  Are located in the Wound/Ulcer Documentation Flow Sheet    Wound/Ulcer #: 1, 2, 3, 4 and 5    Post Debridement Measurements:  Wound/Ulcer Descriptions are Pre Debridement except measurements:    Wound 07/19/18 Venous ulcer Pretibial Right #1 blocked acq: 6-19-18 (Active)   Wound Image   8/30/2018  3:17 PM   Wound Type Wound 7/19/2018  3:46 PM   Wound Diabetic Reeder 1 7/19/2018  3:46 PM   Dressing Status Clean;Dry; Intact 8/9/2018  4:52 PM   Dressing Changed Changed/New 8/9/2018  4:52 PM   Dressing/Treatment Dry dressing;Silver dressing 8/9/2018  4:52 PM   Wound Cleansed Rinsed/Irrigated with saline 8/9/2018  4:52 PM   Wound Length (cm) 9 cm 8/30/2018  4:00 PM   Wound Width (cm) 10.5 cm 8/30/2018  4:00 PM   Wound Depth (cm)  0.2 8/30/2018  4:00 PM   Calculated Wound Size (cm^2) (l*w) 94.5 cm^2 8/30/2018  4:00 PM   Change in Wound Size % (l*w) -54.41 8/30/2018  4:00 PM   Wound Assessment Pink 8/30/2018  3:17 PM   Drainage Amount Copious 8/30/2018  3:17 PM   Drainage Description Yellow 8/30/2018  3:17 PM   Odor None 8/30/2018  3:17 PM   Ely-wound Assessment Pink; Maceration 8/30/2018  3:17 PM   Time out Yes 8/30/2018  4:00 PM   Number of days: 42       Wound 07/19/18 Venous ulcer Pretibial Left #2 blocked acq:  6-19-18 (Active)   Wound Image   8/30/2018  3:17 PM   Wound Type Wound 7/19/2018  3:46 PM   Wound Diabetic Reeder 1 7/19/2018  3:46 PM   Dressing Status Clean;Dry; Intact 8/9/2018  4:52 PM   Dressing Changed Changed/New 8/9/2018  4:52 PM   Dressing/Treatment Silver dressing 8/9/2018  4:52 PM   Wound Cleansed Rinsed/Irrigated with saline 8/9/2018  4:52 PM   Wound Length (cm) 11 cm 8/30/2018  4:00 PM   Wound Width (cm) 9 cm 8/30/2018  4:00 PM   Wound Depth (cm)  0.2 8/30/2018  4:00 PM   Calculated Wound Size (cm^2) (l*w) 99 cm^2 8/30/2018  4:00 PM   Change in Wound Size % (l*w) -247.37 8/30/2018  4:00 PM   Wound Assessment Pink;Red;Yellow; White;Brown 8/30/2018  3:17 PM Drainage Amount Large 8/30/2018  3:17 PM   Drainage Description Yellow 8/30/2018  3:17 PM   Odor None 8/30/2018  3:17 PM   Ely-wound Assessment Pink; Maceration 8/30/2018  3:17 PM   Time out Yes 8/30/2018  4:00 PM   Number of days: 42       Wound 07/19/18 Venous ulcer Leg Left; Lower; Posterior #3 blocked  acq: 6-19-18 (Active)   Wound Image   8/30/2018  3:17 PM   Wound Type Wound 7/19/2018  3:46 PM   Wound Diabetic Reeder 1 7/19/2018  3:46 PM   Dressing Status Clean;Dry; Intact 8/9/2018  4:52 PM   Dressing Changed Changed/New 8/9/2018  4:52 PM   Dressing/Treatment Silver dressing 8/9/2018  4:52 PM   Wound Cleansed Rinsed/Irrigated with saline 8/9/2018  4:52 PM   Wound Length (cm) 11 cm 8/30/2018  4:03 PM   Wound Width (cm) 12 cm 8/30/2018  4:03 PM   Wound Depth (cm)  0.2 8/30/2018  4:03 PM   Calculated Wound Size (cm^2) (l*w) 132 cm^2 8/30/2018  4:03 PM   Change in Wound Size % (l*w) -89.66 8/30/2018  4:03 PM   Wound Assessment Pink;Red;Yellow; White 8/30/2018  3:17 PM   Drainage Amount Copious 8/30/2018  3:17 PM   Drainage Description Serosanguinous; Yellow 8/30/2018  3:17 PM   Odor None 8/30/2018  3:17 PM   Ely-wound Assessment Pink; Maceration 8/30/2018  3:17 PM   Time out No 8/30/2018  4:03 PM   Number of days: 42       Wound 07/19/18 Venous ulcer Leg Right;Medial;Lower #4 acq: 6-19-18 (Active)   Wound Image   7/19/2018  3:46 PM   Wound Diabetic Reeder 1 7/19/2018  3:46 PM   Dressing Status Clean; Intact;Dry 7/26/2018  4:20 PM   Dressing Changed Changed/New 7/26/2018  4:20 PM   Dressing/Treatment Dry dressing 7/26/2018  4:20 PM   Wound Cleansed Wound cleanser 8/2/2018  4:27 PM   Wound Length (cm) 0.5 cm 8/2/2018  4:50 PM   Wound Width (cm) 1.6 cm 8/2/2018  4:50 PM   Wound Depth (cm)  0 8/2/2018  4:50 PM   Calculated Wound Size (cm^2) (l*w) 0.8 cm^2 8/2/2018  4:50 PM   Change in Wound Size % (l*w) 73.33 8/2/2018  4:50 PM   Wound Assessment Pink 8/2/2018  4:27 PM   Drainage Amount Large 8/2/2018  4:27 PM   Drainage Description Brown;Green;Tan;Yellow 8/2/2018  4:27 PM   Odor None 7/26/2018  3:28 PM   Ely-wound Assessment Maceration;Pink 8/2/2018  4:27 PM   Time out Yes 7/26/2018  3:58 PM   Number of days: 42       Wound 08/02/18 Venous ulcer Leg Right; Lower; Posterior wound # 5 right post calf, acquired 7-30-18  (#4 in iheal) (Active)   Wound Image   8/30/2018  3:17 PM   Wound Type Wound 8/9/2018  4:10 PM   Wound Venous 8/9/2018  4:10 PM   Dressing Status Clean;Dry; Intact 8/9/2018  4:52 PM   Dressing Changed Changed/New 8/9/2018  4:52 PM   Dressing/Treatment Dry dressing;Silver dressing 8/9/2018  4:52 PM   Wound Cleansed Rinsed/Irrigated with saline 8/9/2018  4:52 PM   Wound Length (cm) 4 cm 8/30/2018  4:00 PM   Wound Width (cm) 4 cm 8/30/2018  4:00 PM   Wound Depth (cm)  0.1 8/30/2018  4:00 PM   Calculated Wound Size (cm^2) (l*w) 16 cm^2 8/30/2018  4:00 PM   Change in Wound Size % (l*w) 9.09 8/30/2018  4:00 PM   Wound Assessment Pink;Yellow 8/30/2018  3:17 PM   Drainage Amount Large 8/30/2018  3:17 PM   Drainage Description Yellow 8/30/2018  3:17 PM   Odor None 8/30/2018  3:17 PM   Ely-wound Assessment Pink; Maceration 8/30/2018  3:17 PM   Time out Yes 8/30/2018  4:00 PM   Number of days: 27     Percent of Wound/Ulcer Debrided: 100%    Total Surface Area Debrided:  344 sq cm     Estimated Blood Loss:  Minimal  Hemostasis Achieved:  by pressure    Procedural Pain:  2  / 10   Post Procedural Pain:  3 / 10     Response to treatment:  Well tolerated by patient. Plan:   Treatment Note please see attached Discharge Instructions    Written patient dismissal instructions given to patient and signed by patient or POA.          Discharge Instructions       Visit Discharge/Physician Orders     Discharge condition: Stable     Assessment of pain at discharge:minimal     Anesthetic used: 2% lidocaine     Discharge to: Home     Left via:Private automobile     Accompanied by: accompanied by self     [facility]: [facility]     Dressing Orders:CLEANSE ULCERS LEFT AND RIGHT LOWER EXTREMITIES WITH NORMAL SALINE, APPLY GENTAMYCIN OINTMENT, (CONTINUE GENTAMYCIN FOR ANOTHER WEEK) ALGINATE AG, DRY DRESSING AND SECURE 3 TIMES A WEEK, SINGLE TUBIGRIP TO BILATERAL LOWER LEGS.     Treatment Orders:     EAT DIET HIGH IN PROTEIN AND VITAMIN C     TAKE A MULTIVITAMIN DAIILY IF OKAY WITH PRIMARY CARE.     PATIENT HISTORY RECENT DVT     TAKE gabapentin (NEURONTIN) 800 MG tablet  Take 0.5 tablets by mouth 3 times daily AS ORDERED PER DR. MORALES     Tissue C&S taken of left lower leg ulcer B99.9        [facility] followup visit ____________1WEEK___DR BATES______________  (Please note your next appointment above and if you are unable to keep, kindly give a 24 hour notice. Thank you.)    Physician signature:__________________________      If you experience any of the following, please call the [facility] during business hours:    * Increase in Pain  * Temperature over 101  * Increase in drainage from your wound  * Drainage with a foul odor  * Bleeding  * Increase in swelling  * Need for compression bandage changes due to slippage, breakthrough drainage. If you need medical attention outside of the business hours of the [facility] please contact your PCP or go to the nearest emergency room.         Electronically signed by Terrance Avalos DPM on 8/30/2018 at 4:06 PM

## 2018-09-05 ENCOUNTER — TELEPHONE (OUTPATIENT)
Dept: ADMINISTRATIVE | Age: 63
End: 2018-09-05

## 2018-09-06 ENCOUNTER — HOSPITAL ENCOUNTER (OUTPATIENT)
Dept: WOUND CARE | Age: 63
Discharge: HOME | End: 2018-09-06
Payer: COMMERCIAL

## 2018-09-06 VITALS
HEART RATE: 100 BPM | SYSTOLIC BLOOD PRESSURE: 134 MMHG | RESPIRATION RATE: 18 BRPM | BODY MASS INDEX: 48.42 KG/M2 | DIASTOLIC BLOOD PRESSURE: 64 MMHG | WEIGHT: 291 LBS | TEMPERATURE: 98.4 F

## 2018-09-06 PROCEDURE — 11045 DBRDMT SUBQ TISS EACH ADDL: CPT

## 2018-09-06 PROCEDURE — 11042 DBRDMT SUBQ TIS 1ST 20SQCM/<: CPT

## 2018-09-06 NOTE — PROGRESS NOTES
[facility] Follow[facility] Visit Note    Referring Physician : Wiliam Rogel DO  Gin Butts  MEDICAL RECORD NUMBER:  [id number]  AGE: 57 y.o. GENDER: female  : 1955  EPISODE DATE:  2018    Subjective:     Chief Complaint   Patient presents with    Wound Check     Both legs      HISTORY of PRESENT ILLNESS HPI   Gin Butts is a 57 y.o. female who presents today for wound/ulcer evaluation. History of Wound Context:  venous     Wound/Ulcer Pain Timing/Severity: intermittent  Quality of pain: aching  Severity:  2 / 10   Modifying Factors: Pain worsens with walking  Associated Signs/Symptoms: edema, erythema and drainage    Ulcer Identification:  Ulcer Type: venous  Contributing Factors: edema, venous stasis and lymphedema    Diabetic/Pressure/Non Pressure Ulcers only:  Ulcer: Non-Pressure ulcer, fat layer exposed    Wound: N/A        PAST MEDICAL HISTORY      Diagnosis Date    Anal fissure     Anemia 10/6/2017    Arthritis     Asthma     Atrial fibrillation ([facility] [address]     Blood transfusion     long time no reaction    CAD (coronary artery disease)     Disc disorder     Fall due to stumbling 10/6/2017    GERD (gastroesophageal reflux disease)     GERD (gastroesophageal reflux disease)     Hyperlipidemia     Hypertension     Inappropriate sinus tachycardia     Low hemoglobin 2018    LVH (left ventricular hypertrophy)     moderate    Lymphadenitis, chronic 2018    Occipital neuralgia 2011    Respiratory acidosis 10/7/2017    Sinus tachycardia 2015    Type II or unspecified type diabetes mellitus without mention of complication, not stated as uncontrolled      Past Surgical History:   Procedure Laterality Date    ANOSCOPY  10/25/2006    injection of anal sphincter with Botox, Franklyn Ortiz/Timmy, [facility]    ANOSCOPY  2007    injection of anal sphincter with Botox, Dr. Charles Elise, [address] [place] [address] ANOSCOPY  2007    injection of anal sphincter with Botox, Drs. Brenda Yoon, [facility]    ANUS SURGERY  4/4/2006    Lateral sphincterotomy for chronic anal fissure, Dr. Brenden Reyes, [facility]  4/18/2012    anal exam and injection of Botox 100 units into anal sphincter, Laila Yoon, [facility]    APPENDECTOMY  1965   [address]    COLONOSCOPY  1/20/2004    cecal polyp, bx (no pathologic changes), Dr. Damien Guy, [address] [facility] COLONOSCOPY  8/11/2006    snare polypectomy terminal ileum polyp (granulation tissue polyp) and anal polyp (inflammatory/papilla), Dr. Laurent Guidry, [facility] COLONOSCOPY  3/23/2012    bx/cauterization proximal ascending colon polyp, injection of anal sphincter with Botox, Dr. Laurent Guidry, [address] REPLACEMENT  2003    [address]    UPPER GASTROINTESTINAL ENDOSCOPY  1/20/2004    gastritis, bx (no H pylori), Dr. Damien Guy, [address] ENDOSCOPY N/A 6/1/2018    EGD BIOPSY performed by Josephine Martinez MD at [facility] ENDOSCOPY     Family History   Problem Relation Age of Onset    Heart Disease Mother     Diabetes Father     Colon Cancer Father     Other Father         BLINDNESS    Colon Cancer Sister     Diabetes Paternal Aunt     Diabetes Paternal Uncle     Diabetes Paternal Aunt     Diabetes Paternal Uncle      Social History   Substance Use Topics    Smoking status: Former Smoker     Packs/day: 1.50     Years: 25.00     Types: Cigarettes     Quit date: 12/18/2004    Smokeless tobacco: Never Used    Alcohol use No      Comment: denies caffeine     Allergies   Allergen Reactions    Statins Other (See Comments)     Muscle pains     Current Outpatient Prescriptions on File Prior to Encounter   Medication Sig Dispense Refill    ampicillin (PRINCIPEN) 500 MG capsule Take 1 capsule by mouth 3 times daily for 10 days 30 capsule 0    ampicillin (PRINCIPEN) 500 MG capsule Take 500 mg by mouth 3 times daily Take for 10 days      torsemide (DEMADEX) 20 MG tablet TAKE 1 TABLET BY MOUTH EVERY DAY 30 tablet 2    FREESTYLE LANCETS MISC USE AS DIRECTED 4 TIMES A  each 2    gabapentin (NEURONTIN) 800 MG tablet Take 0.5 tablets by mouth 3 times daily for 30 days. . 45 tablet 0    apixaban (ELIQUIS) 5 MG TABS tablet Take 1 tablet by mouth 2 times daily 60 tablet 2    insulin lispro (HUMALOG) 100 UNIT/ML injection vial TAKE 90 UNITS WITH BREAKFAST. TAKE 92 UNITS WITH LUNCH. TAKE 94 UNITS WITH DINNER. 900 mL 5    fexofenadine (ALLEGRA) 180 MG tablet TAKE 1 TABLET BY MOUTH DAILY 30 tablet 2    blood glucose test strips (FREESTYLE LITE) strip USE TO TEST BLOOD SUGAR FOUR TIMES DAILY 100 strip 5    Insulin Syringe-Needle U-100 (B-D INS SYR ULTRAFINE 1CC/31G) 31G X 5/16\" 1 ML MISC USE AS DIRECTED 100 each 11    metoprolol (LOPRESSOR) 100 MG tablet TAKE 1 TABLET BY MOUTH 2 TIMES DAILY 180 tablet 1    montelukast (SINGULAIR) 10 MG tablet TAKE 1 TABLET BY MOUTH NIGHTLY 90 tablet 1    spironolactone (ALDACTONE) 50 MG tablet TAKE 1/2 TABLET BY MOUTH DAILY  5    nystatin (MYCOSTATIN) 948462 UNIT/GM cream Apply topically 2 times daily.  60 g 2    escitalopram (LEXAPRO) 20 MG tablet TAKE 1 TABLET BY MOUTH DAILY 90 tablet 1    diltiazem (CARDIZEM CD) 240 MG extended release capsule TAKE 1 CAPSULE BY MOUTH 2 TIMES DAILY 180 capsule 1    cyclobenzaprine (FLEXERIL) 10 MG tablet TAKE 1 TABLET BY MOUTH 3 TIMES DAILY AS NEEDED (MUSCLE PAIN) 90 tablet 5    ferrous sulfate (NAOMY-ALEC) 325 (65 Fe) MG tablet Take 1 tablet by mouth daily (with breakfast) 30 tablet 3    insulin glargine (LANTUS) 100 UNIT/ML injection vial Inject 110 Units into the skin nightly 60 mL 3    docusate sodium (COLACE) 100 MG capsule TAKE 1 CAPSULE BY MOUTH 3 TIMES DAILY 90 capsule 0    fluticasone (FLONASE) 50 MCG/ACT nasal spray USE 2 SPRAYS BY NASAL ROUTE DAILY 1 Bottle 5    [employer] ALCOHOL SWABS PADS USE 3 TIMES A  each 5    oxyCODONE-acetaminophen (PERCOCET)  MG per subcutaneous tissue. Devitalized Tissue Debrided:  fibrin, biofilm, slough and necrotic/eschar to stimulate bleeding to promote healing, post debridement good bleeding base and wound edges noted    Pre Debridement Measurements:  Are located in the Wound/Ulcer Documentation Flow Sheet    Wound/Ulcer #: 1, 2, 3 and 4    Post Debridement Measurements:  Wound/Ulcer Descriptions are Pre Debridement except measurements:    Wound 07/19/18 Venous ulcer Pretibial Right #1 blocked acq: 6-19-18 (Active)   Wound Image   8/30/2018  3:17 PM   Wound Type Wound 7/19/2018  3:46 PM   Wound Diabetic Reeder 1 7/19/2018  3:46 PM   Dressing Status Clean;Dry; Intact 8/30/2018  4:03 PM   Dressing Changed Changed/New 8/30/2018  4:03 PM   Dressing/Treatment Silver dressing 8/30/2018  4:03 PM   Wound Cleansed Rinsed/Irrigated with saline 8/30/2018  4:03 PM   Wound Length (cm) 9.5 cm 9/6/2018  3:27 PM   Wound Width (cm) 10.8 cm 9/6/2018  3:27 PM   Wound Depth (cm)  0.3 9/6/2018  3:27 PM   Calculated Wound Size (cm^2) (l*w) 102.6 cm^2 9/6/2018  3:27 PM   Change in Wound Size % (l*w) -67.65 9/6/2018  3:27 PM   Wound Assessment Pink;Yellow 9/6/2018  3:00 PM   Drainage Amount Copious 9/6/2018  3:00 PM   Drainage Description Yellow 9/6/2018  3:00 PM   Odor None 9/6/2018  3:00 PM   Ely-wound Assessment Maceration;Pink 9/6/2018  3:00 PM   Time out Yes 9/6/2018  3:27 PM   Number of days: 48       Wound 07/19/18 Venous ulcer Pretibial Left #2 blocked acq:  6-19-18 (Active)   Wound Image   8/30/2018  3:17 PM   Wound Type Wound 7/19/2018  3:46 PM   Wound Diabetic Reeder 1 7/19/2018  3:46 PM   Dressing Status Clean;Dry; Intact 8/30/2018  4:03 PM   Dressing Changed Changed/New 8/30/2018  4:03 PM   Dressing/Treatment Silver dressing 8/30/2018  4:03 PM   Wound Cleansed Rinsed/Irrigated with saline 8/30/2018  4:03 PM   Wound Length (cm) 10.5 cm 9/6/2018  3:27 PM   Wound Width (cm) 10.2 cm 9/6/2018  3:27 PM   Wound Depth (cm)  0.3 9/6/2018  3:27 PM Calculated Wound Size (cm^2) (l*w) 107.1 cm^2 9/6/2018  3:27 PM   Change in Wound Size % (l*w) -275.79 9/6/2018  3:27 PM   Wound Assessment Pink;Red;Yellow 9/6/2018  3:00 PM   Drainage Amount Large 9/6/2018  3:00 PM   Drainage Description Yellow 9/6/2018  3:00 PM   Odor None 9/6/2018  3:00 PM   Ely-wound Assessment Pink; Maceration 9/6/2018  3:00 PM   Time out Yes 9/6/2018  3:27 PM   Number of days: 48       Wound 07/19/18 Venous ulcer Leg Left; Lower; Posterior #3 blocked  acq: 6-19-18 (Active)   Wound Image   8/30/2018  3:17 PM   Wound Type Wound 7/19/2018  3:46 PM   Wound Diabetic Reeder 1 7/19/2018  3:46 PM   Dressing Status Clean;Dry; Intact 8/30/2018  4:03 PM   Dressing Changed Changed/New 8/30/2018  4:03 PM   Dressing/Treatment Silver dressing 8/30/2018  4:03 PM   Wound Cleansed Rinsed/Irrigated with saline 8/30/2018  4:03 PM   Wound Length (cm) 11 cm 9/6/2018  3:27 PM   Wound Width (cm) 12.4 cm 9/6/2018  3:27 PM   Wound Depth (cm)  0.2 9/6/2018  3:27 PM   Calculated Wound Size (cm^2) (l*w) 136.4 cm^2 9/6/2018  3:27 PM   Change in Wound Size % (l*w) -95.98 9/6/2018  3:27 PM   Wound Assessment Pink;Red;Yellow 9/6/2018  3:00 PM   Drainage Amount Copious 9/6/2018  3:00 PM   Drainage Description Yellow 9/6/2018  3:00 PM   Odor None 9/6/2018  3:00 PM   Ely-wound Assessment Pink; Maceration 9/6/2018  3:00 PM   Time out Yes 9/6/2018  3:27 PM   Number of days: 48       Wound 07/19/18 Venous ulcer Leg Right;Medial;Lower #4 acq: 6-19-18 (Active)   Wound Image   7/19/2018  3:46 PM   Wound Diabetic Reeder 1 7/19/2018  3:46 PM   Dressing Status Clean; Intact;Dry 7/26/2018  4:20 PM   Dressing Changed Changed/New 7/26/2018  4:20 PM   Dressing/Treatment Dry dressing 7/26/2018  4:20 PM   Wound Cleansed Wound cleanser 8/2/2018  4:27 PM   Wound Length (cm) 0.5 cm 8/2/2018  4:50 PM   Wound Width (cm) 1.6 cm 8/2/2018  4:50 PM   Wound Depth (cm)  0 8/2/2018  4:50 PM   Calculated Wound Size (cm^2) (l*w) 0.8 cm^2 8/2/2018  4:50 PM Change in Wound Size % (l*w) 73.33 8/2/2018  4:50 PM   Wound Assessment Pink 8/2/2018  4:27 PM   Drainage Amount Large 8/2/2018  4:27 PM   Drainage Description Brown;Green;Tan;Yellow 8/2/2018  4:27 PM   Odor None 7/26/2018  3:28 PM   Ely-wound Assessment Maceration;Pink 8/2/2018  4:27 PM   Time out Yes 7/26/2018  3:58 PM   Number of days: 48       Wound 08/02/18 Venous ulcer Leg Right; Lower; Posterior wound # 5 right post calf, acquired 7-30-18  (#4 in iheal) (Active)   Wound Image   8/30/2018  3:17 PM   Wound Type Wound 8/9/2018  4:10 PM   Wound Venous 8/9/2018  4:10 PM   Dressing Status Clean;Dry; Intact 8/30/2018  4:03 PM   Dressing Changed Changed/New 8/30/2018  4:03 PM   Dressing/Treatment Silver dressing 8/30/2018  4:03 PM   Wound Cleansed Rinsed/Irrigated with saline 8/30/2018  4:03 PM   Wound Length (cm) 4.4 cm 9/6/2018  3:27 PM   Wound Width (cm) 4.2 cm 9/6/2018  3:27 PM   Wound Depth (cm)  0.2 9/6/2018  3:27 PM   Calculated Wound Size (cm^2) (l*w) 18.48 cm^2 9/6/2018  3:27 PM   Change in Wound Size % (l*w) -5 9/6/2018  3:27 PM   Wound Assessment Pink;Yellow 9/6/2018  3:00 PM   Drainage Amount Large 9/6/2018  3:00 PM   Drainage Description Yellow 9/6/2018  3:00 PM   Odor None 9/6/2018  3:00 PM   Ely-wound Assessment Pink; Maceration 9/6/2018  3:00 PM   Time out Yes 9/6/2018  3:27 PM   Number of days: 34     Percent of Wound/Ulcer Debrided: 100%    Total Surface Area Debrided:  250 sq cm     Estimated Blood Loss:  Minimal  Hemostasis Achieved:  by pressure    Procedural Pain:  2  / 10   Post Procedural Pain:  3 / 10     Response to treatment:  Well tolerated by patient. Plan:   Treatment Note please see attached Discharge Instructions    Written patient dismissal instructions given to patient and signed by patient or POA.          Discharge Instructions       Visit Discharge/Physician Orders     Discharge condition: Stable     Assessment of pain at discharge:minimal     Anesthetic used: 2% lidocaine     Discharge to: Home     Left via:Private automobile     Accompanied by: accompanied by self     ECF/[facility]: [place] [id number]. NURSE VISIT ON Monday TO CHANGE DRESSINGS AS ORDERED BELOW.     Dressing Orders:CLEANSE ULCERS LEFT AND RIGHT LOWER EXTREMITIES WITH NORMAL SALINE, APPLY GENTAMYCIN OINTMENT. APPLY  ALGINATE AG, DRY DRESSING AND SECURE WITH COBAN 2. LEAVE THIS IN PLACE UNTIL Monday. THEN HOME CARE TO CHANGE DRESSING ON MONDAY     Treatment Orders:     EAT DIET HIGH IN PROTEIN AND VITAMIN C     TAKE A MULTIVITAMIN DAIILY IF OKAY WITH PRIMARY CARE.     TAKE gabapentin (NEURONTIN) 800 MG tablet  Take 0.5 tablets by mouth 3 times daily AS ORDERED PER DR. MORALES     Continue: ampicillin (PRINCIPEN) 500 MG capsule  Take 500 mg by mouth 3 times daily until completed     [facility] followup visit ____________1WEEK___DR BATES______________  (Please note your next appointment above and if you are unable to keep, kindly give a 24 hour notice. Thank you.)    Physician signature:__________________________      If you experience any of the following, please call the [facility] during business hours:    * Increase in Pain  * Temperature over 101  * Increase in drainage from your wound  * Drainage with a foul odor  * Bleeding  * Increase in swelling  * Need for compression bandage changes due to slippage, breakthrough drainage. If you need medical attention outside of the business hours of the [facility] please contact your PCP or go to the nearest emergency room.         Electronically signed by Carlos Manuel Quinones DPM on 9/6/2018 at 3:35 PM

## 2018-09-06 NOTE — PLAN OF CARE
Problem: Pain:  Goal: Pain level will decrease  Pain level will decrease   Outcome: Ongoing    Goal: Control of acute pain  Control of acute pain   Outcome: Ongoing    Goal: Control of chronic pain  Control of chronic pain   Outcome: Ongoing      Problem: Wound:  Goal: Will show signs of wound healing; wound closure and no evidence of infection  Will show signs of wound healing; wound closure and no evidence of infection   Outcome: Ongoing      Problem: Compression therapy:  Goal: Will be free from complications associated with compression therapy  Will be free from complications associated with compression therapy   Outcome: Ongoing      Problem: Falls - Risk of:  Goal: Will remain free from falls  Will remain free from falls   Outcome: Ongoing

## 2018-09-08 DIAGNOSIS — G89.4 CHRONIC PAIN SYNDROME: ICD-10-CM

## 2018-09-11 RX ORDER — GABAPENTIN 800 MG/1
TABLET ORAL
Qty: 45 TABLET | Refills: 2 | Status: ON HOLD | OUTPATIENT
Start: 2018-09-11 | End: 2018-12-20 | Stop reason: HOSPADM

## 2018-09-12 ENCOUNTER — OFFICE VISIT (OUTPATIENT)
Dept: SURGERY | Age: 63
End: 2018-09-12
Payer: COMMERCIAL

## 2018-09-12 VITALS
RESPIRATION RATE: 20 BRPM | HEART RATE: 78 BPM | OXYGEN SATURATION: 93 % | BODY MASS INDEX: 49.61 KG/M2 | HEIGHT: 63 IN | WEIGHT: 280 LBS | TEMPERATURE: 99 F

## 2018-09-12 DIAGNOSIS — T14.8XXA MOREL LAVALLEE LESION: Primary | ICD-10-CM

## 2018-09-12 PROCEDURE — G8417 CALC BMI ABV UP PARAM F/U: HCPCS | Performed by: PLASTIC SURGERY

## 2018-09-12 PROCEDURE — G8428 CUR MEDS NOT DOCUMENT: HCPCS | Performed by: PLASTIC SURGERY

## 2018-09-12 PROCEDURE — 1036F TOBACCO NON-USER: CPT | Performed by: PLASTIC SURGERY

## 2018-09-12 PROCEDURE — 3017F COLORECTAL CA SCREEN DOC REV: CPT | Performed by: PLASTIC SURGERY

## 2018-09-12 PROCEDURE — 99203 OFFICE O/P NEW LOW 30 MIN: CPT | Performed by: PLASTIC SURGERY

## 2018-09-12 PROCEDURE — G8598 ASA/ANTIPLAT THER USED: HCPCS | Performed by: PLASTIC SURGERY

## 2018-09-12 NOTE — PROGRESS NOTES
Department of Plastic Surgery - Adult  Attending Consult Note      CHIEF COMPLAINT:   Mass of left leg    History Obtained From:  patient    HISTORY OF PRESENT ILLNESS:                The patient is a 57 y.o. female who presents with left leg open wound. The patient presents for office at the request of her primary care physician. The patient states she is unsure why she is at our office and does not know why she was sent here by her primary care doctor. She states that she is unaware of any mass in her leg and has had no recent history of trauma to the leg. She states she goes to the wound clinic for her left lower leg open draining wound. She states that home care is coming today to dress her wound. She states that she has had imaging for DVT to her bilateral lower legs recently and believes her MRI was done to rule out any underlying lower leg condition. She denies any shortness of breath or left lower leg pain. Past Medical History:    Past Medical History:   Diagnosis Date    Anal fissure     Anemia 10/6/2017    Arthritis     Asthma     Atrial fibrillation ([address]     Blood transfusion     long time no reaction    CAD (coronary artery disease)     Disc disorder     Fall due to stumbling 10/6/2017    GERD (gastroesophageal reflux disease)     GERD (gastroesophageal reflux disease)     Hyperlipidemia     Hypertension     Inappropriate sinus tachycardia     Low hemoglobin 4/13/2018    LVH (left ventricular hypertrophy)     moderate    Lymphadenitis, chronic 4/13/2018    Occipital neuralgia 11/2/2011    Respiratory acidosis 10/7/2017    Sinus tachycardia 2/16/2015    Type II or unspecified type diabetes mellitus without mention of complication, not stated as uncontrolled      Past Surgical History:    Past Surgical History:   Procedure Laterality Date    ANOSCOPY  10/25/2006    injection of anal sphincter with Botox, Franklyn Ortiz/Timmy, [facility]    ANOSCOPY  4/9/2007    injection of anal sphincter with Botox, Dr. Laly Thompson, [facility] ANOSCOPY  6/13/2007    injection of anal sphincter with Botox, Franklyn Toribio [facility]    ANUS SURGERY  4/4/2006    Lateral sphincterotomy for chronic anal fissure, Dr. Scott Moreno [facility]  4/18/2012    anal exam and injection of Botox 100 units into anal sphincter, Laila Toribio, [facility]    APPENDECTOMY  1965   [address]    COLONOSCOPY  1/20/2004    cecal polyp, bx (no pathologic changes), Dr. Sharon Francois, [facility] COLONOSCOPY  8/11/2006    snare polypectomy terminal ileum polyp (granulation tissue polyp) and anal polyp (inflammatory/papilla), Dr. Laly Thompson, [facility] COLONOSCOPY  3/23/2012    bx/cauterization proximal ascending colon polyp, injection of anal sphincter with Botox, Dr. Laly Thompson, [place] [id number]    [id number] ENDOSCOPY  1/20/2004    gastritis, bx (no H pylori), Dr. Sharon Francois, [address] ENDOSCOPY N/A 6/1/2018    EGD BIOPSY performed by Farhana Lizarraga MD at [facility] ENDOSCOPY     Current Medications:   No current facility-administered medications for this visit.    Allergies:  Statins    Social History:   Social History     Social History    Marital status: Single     Spouse name: N/A    Number of children: 3    Years of education: 9     Occupational History    SSD      Social History Main Topics    Smoking status: Former Smoker     Packs/day: 1.50     Years: 25.00     Types: Cigarettes     Quit date: 12/18/2004    Smokeless tobacco: Never Used    Alcohol use No      Comment: denies caffeine    Drug use: No    Sexual activity: Not Currently     Other Topics Concern    Not on file     Social History Narrative    No narrative on file     Family History:   Family History   Problem Relation Age of Onset    Heart Disease Mother     Diabetes Father     Colon Cancer Father     Other Father         BLINDNESS    Colon Cancer Sister     Diabetes Paternal Aunt     Diabetes Paternal Uncle     Diabetes Paternal Aunt     Diabetes Paternal Uncle        REVIEW OF SYSTEMS:    CONSTITUTIONAL:  negative for  fevers, chills, sweats and fatigue  EYES: negative for dipolpia or acute vision loss. RESPIRATORY:  negative for  dry cough, cough with sputum, dyspnea, wheezing and chest pain  HENT:negative for pain, headache, difficulty swallowing or nose bleeds. CARDIOVASCULAR:  negative for  chest pain, dyspnea, palpitations, syncope  GASTROINTESTINAL:  negative for nausea, vomiting, change in bowel habits, diarrhea, constipation and abdominal pain  EXTREMITIES: negative for edema  MUSCULOSKELETAL: negative for muscle weakness  SKIN: positive for lesionnegative for itching or rashes. HEME: negative for easy brusing or bleeding  BEHAVIOR/PSYCH:  negative for poor appetite, increased appetite, decreased sleep and poor concentration      PHYSICAL EXAM:    VITALS:  Pulse 78   Temp 99 °F (37.2 °C) (Tympanic)   Resp 20   Ht 5' 3\" (1.6 m)   Wt 280 lb (127 kg)   LMP  (LMP Unknown)   SpO2 93%   Breastfeeding? No   BMI 49.60 kg/m²   CONSTITUTIONAL:  awake, alert, cooperative, no apparent distress, and appears stated age  EYES: PERRLA, EOMI, no signs of occular infection  LUNGS:  No increased work of breathing, good air exchange, clear to auscultation bilaterally, no crackles or wheezing  CARDIOVASCULAR:  Normal apical impulse, regular rate and rhythm, normal S1 and S2, no S3 or S4, and no murmur noted  ABDOMEN:  No scars, normal bowel sounds, soft, non-distended, non-tender, no masses palpated, no hepatosplenomegally  EXTREMITIES: no signs of clubbing or cyanosis. MUSCULOSKELETAL: negative for flaccid muscle tone or spastic movements. NEURO: Cranial nerves II-XII grossly intact. No signs of agitated mood.    Left leg: No palpable masses appreciated to the left upper thigh with imaging concerns of [facility] Yesi lesion. Open wound of left lower leg being addressed by wound clinic. No signs or concerns of DVT. DATA:    Labs: CBC:   Lab Results   Component Value Date    WBC 8.0 07/10/2018    RBC 3.71 07/10/2018    HGB 9.7 07/10/2018    HCT 30.9 07/10/2018    MCV 83.3 07/10/2018    MCH 26.1 07/10/2018    MCHC 31.4 07/10/2018    RDW 15.2 07/10/2018     07/10/2018    MPV 9.8 07/10/2018     BMP:    Lab Results   Component Value Date     07/10/2018    K 4.0 07/10/2018    K 5.1 2018    CL 95 07/10/2018    CO2 31 07/10/2018    BUN 33 07/10/2018    LABALBU 4.1 2018    LABALBU 4.5 2011    CREATININE 0.9 07/10/2018    CALCIUM 9.9 07/10/2018    GFRAA >60 07/10/2018    LABGLOM >60 07/10/2018    GLUCOSE 145 07/10/2018    GLUCOSE 181 2011       Radiology Review:  No radiology needed at this time  Patient MRN:  [id number]   : 1955   Age: 57 years   Gender: Female       Order Date:  2018 12:53 PM       EXAM: MRI FEMUR LEFT WO CONTRAST       NUMBER OF IMAGES: 249       INDICATION: Patient is a 53-year-old woman with history of palpable   lump of the left thigh.       COMPARISON: Ultrasound left lower extremity July 10, 2018       TECHNIQUE: Multiplanar, multi-sequential MRI of the left thigh was   obtained with no gadolinium administration.       FINDINGS: Study is technically limited due to patient's body habitus   and technical reasons.       No evidence for fracture or dislocation. No bone marrow signal   abnormality of the visualized left femur.       Signal abnormality in the thickness of the adductor longus muscle in   the left upper thigh extending on 9 cm craniocaudally (axial STIR   image 13 through 20). It appears high on T2 and low on T1-weighted   images.  It has subtle punctate low signals on T1 and T2.       Also noted is diffuse T2 high signal of the adductor longus muscle in   the left mid thigh (axial STIR image 20-25) could represent edema or   muscular sprain.     There are T2 hyperintensities of the left external obturator muscle   representing muscular sprain or edema.       There is a 4 cm craniocaudallygap filled with fluid/hematoma (sagittal   image 13, axial image 11 through 15) distal to left ischial tuberosity   and medial to the hamstring tendons suggestion at least partial   hamstring tendon tear/avulsion.       Fluid is noted on the lateral aspect of the upper and mid thigh at the   junction of the subcutaneous fat and muscular structures laterally   extending on 20 cm craniocaudally.       This is study is limited due to lack of IV gadolinium.           Impression       1. Limited study due to patient's body habitus and technical reasons. Limitation due to lack of IV gadolinium. 2. No evidence for fracture or dislocation. 3. Signal abnormality in the thickness of the adductor longus muscle   extending on 9 cm craniocaudally corresponding to recent ultrasound   findings, most likely representing a hematoma. However follow-up to   rule out underlying mass is recommended. 4. Diffuse T2 hyperintensities of the adductor longus muscle in the   mid thigh representing edema or muscular sprain. 5. Diffuse T2 hyperintensities of the external obturator muscle   representing muscular edema or sprain. 6. Fluid collection extending on 4 cm craniocaudally distal to left   ischial tuberosity and suggestion of partial hamstring tendon tear or   avulsion. 7. Diffuse fluid collection at the junction of the subcutaneous fat   and muscular structures of the left upper thigh laterally measuring   approximately 1.5 cm in thickness extending on 20 cm craniocaudally,   concern for moderate Morelle Yesi collection.           IMPRESSION/RECOMMENDATIONS:        Diagnosis  -) Left femur Glenn lavale lesion     After examination of the patient's left upper leg there is no palpable Cammie Delvis lesion and no pain or tenderness on palpation.   No surgical intervention needed at this time. Continue with wound clinic for left lower leg wound. Left lower leg wound dressed today with a ABD pad and Kerlix wrap. Follow up as needed. Photos obtained    I attest that the patient was seen and examined by me, and concur with the documentation above. I agree with the assessment and the plan outlined. This document is generated, in part, by voice recognition software and thus  syntax and grammatical errors are possible.     Kacey Carter  10:11 AM  9/14/2018

## 2018-09-13 ENCOUNTER — HOSPITAL ENCOUNTER (OUTPATIENT)
Dept: WOUND CARE | Age: 63
Discharge: HOME | End: 2018-09-13
Payer: COMMERCIAL

## 2018-09-13 VITALS
WEIGHT: 280 LBS | BODY MASS INDEX: 49.61 KG/M2 | DIASTOLIC BLOOD PRESSURE: 72 MMHG | HEIGHT: 63 IN | RESPIRATION RATE: 20 BRPM | SYSTOLIC BLOOD PRESSURE: 128 MMHG | HEART RATE: 92 BPM | TEMPERATURE: 98.1 F

## 2018-09-13 DIAGNOSIS — I73.9 PVD (PERIPHERAL VASCULAR DISEASE) (HCC): Primary | ICD-10-CM

## 2018-09-13 PROCEDURE — 11045 DBRDMT SUBQ TISS EACH ADDL: CPT

## 2018-09-13 PROCEDURE — 11042 DBRDMT SUBQ TIS 1ST 20SQCM/<: CPT

## 2018-09-13 NOTE — PROGRESS NOTES
edges noted    Pre Debridement Measurements:  Are located in the Wound/Ulcer Documentation Flow Sheet    Wound/Ulcer #: 1, 2, 3, 4 and 5    Post Debridement Measurements:  Wound/Ulcer Descriptions are Pre Debridement except measurements:    Wound 07/19/18 Venous ulcer Pretibial Right #1 blocked acq: 6-19-18 (Active)   Wound Image   8/30/2018  3:17 PM   Wound Type Wound 7/19/2018  3:46 PM   Wound Diabetic Reeder 1 7/19/2018  3:46 PM   Dressing Status Clean;Dry; Intact 8/30/2018  4:03 PM   Dressing Changed Changed/New 8/30/2018  4:03 PM   Dressing/Treatment Silver dressing 8/30/2018  4:03 PM   Wound Cleansed Rinsed/Irrigated with saline 8/30/2018  4:03 PM   Wound Length (cm) 6.3 cm 9/13/2018  3:34 PM   Wound Width (cm) 16.5 cm 9/13/2018  3:34 PM   Wound Depth (cm)  0.1 9/13/2018  3:34 PM   Calculated Wound Size (cm^2) (l*w) 103.95 cm^2 9/13/2018  3:34 PM   Change in Wound Size % (l*w) -69.85 9/13/2018  3:34 PM   Wound Assessment Pink;Tan;White;Yellow 9/13/2018  3:34 PM   Drainage Amount Copious 9/13/2018  3:34 PM   Drainage Description Brown;Yellow 9/13/2018  3:34 PM   Odor None 9/13/2018  3:34 PM   Ely-wound Assessment Pale;Pink 9/13/2018  3:34 PM   Time out Yes 9/6/2018  3:27 PM   Number of days: 56       Wound 07/19/18 Venous ulcer Pretibial Left #2 blocked acq:  6-19-18 (Active)   Wound Image   8/30/2018  3:17 PM   Wound Type Wound 7/19/2018  3:46 PM   Wound Diabetic Reeder 1 7/19/2018  3:46 PM   Dressing Status Clean;Dry; Intact 8/30/2018  4:03 PM   Dressing Changed Changed/New 8/30/2018  4:03 PM   Dressing/Treatment Silver dressing 8/30/2018  4:03 PM   Wound Cleansed Rinsed/Irrigated with saline 8/30/2018  4:03 PM   Wound Length (cm) 11.4 cm 9/13/2018  3:34 PM   Wound Width (cm) 10.3 cm 9/13/2018  3:34 PM   Wound Depth (cm)  0.1 9/13/2018  3:34 PM   Calculated Wound Size (cm^2) (l*w) 117.42 cm^2 9/13/2018  3:34 PM   Change in Wound Size % (l*w) -312 9/13/2018  3:34 PM   Wound Assessment Pink;Tan;White;Yellow automobile     Accompanied by: accompanied by self     ECF/[facility]: [id number] VISIT ON Monday TO CHANGE DRESSINGS AS ORDERED BELOW.     Dressing Orders:CLEANSE ULCERS LEFT AND RIGHT LOWER EXTREMITIES WITH NORMAL SALINE, APPLY GENTAMYCIN OINTMENT. APPLY  ALGINATE AG, DRY DRESSING AND SECURE WITH COBAN 2.  LEAVE THIS IN PLACE UNTIL Monday.    THEN HOME CARE TO CHANGE DRESSING ON MONDAY     Treatment Orders:     EAT DIET HIGH IN PROTEIN AND VITAMIN C     TAKE A MULTIVITAMIN DAIILY IF OKAY WITH PRIMARY CARE.     TAKE gabapentin (NEURONTIN) 800 MG tablet  Take 0.5 tablets by mouth 3 times daily AS ORDERED PER DR. MORALES     Continue: ampicillin (PRINCIPEN) 500 MG capsule  Take 500 mg by mouth 3 times daily until completed     [facility] followup visit ____________1WEEK___DR BATES______________  (Please note your next appointment above and if you are unable to keep, kindly give a 24 hour notice. Thank you.)    Physician signature:__________________________      If you experience any of the following, please call the [facility] during business hours:    * Increase in Pain  * Temperature over 101  * Increase in drainage from your wound  * Drainage with a foul odor  * Bleeding  * Increase in swelling  * Need for compression bandage changes due to slippage, breakthrough drainage. If you need medical attention outside of the business hours of the [facility] please contact your PCP or go to the nearest emergency room.         Electronically signed by Lorenzo Medina DPM on 9/13/2018 at 4:28 PM

## 2018-09-13 NOTE — PLAN OF CARE
Problem: Wound:  Goal: Will show signs of wound healing; wound closure and no evidence of infection  Will show signs of wound healing; wound closure and no evidence of infection   Outcome: Ongoing      Problem: Compression therapy:  Goal: Will be free from complications associated with compression therapy  Will be free from complications associated with compression therapy   Outcome: Ongoing      Problem: Falls - Risk of:  Goal: Will remain free from falls  Will remain free from falls   Outcome: Ongoing      Problem: Blood Glucose:  Goal: Ability to maintain appropriate glucose levels will improve  Ability to maintain appropriate glucose levels will improve   Outcome: Ongoing

## 2018-09-14 ENCOUNTER — OFFICE VISIT (OUTPATIENT)
Dept: FAMILY MEDICINE CLINIC | Age: 63
End: 2018-09-14
Payer: COMMERCIAL

## 2018-09-14 VITALS
DIASTOLIC BLOOD PRESSURE: 61 MMHG | HEIGHT: 63 IN | WEIGHT: 293 LBS | BODY MASS INDEX: 51.91 KG/M2 | TEMPERATURE: 98.6 F | SYSTOLIC BLOOD PRESSURE: 117 MMHG | RESPIRATION RATE: 18 BRPM | HEART RATE: 92 BPM | OXYGEN SATURATION: 87 %

## 2018-09-14 DIAGNOSIS — I50.32 CHRONIC DIASTOLIC CONGESTIVE HEART FAILURE (HCC): ICD-10-CM

## 2018-09-14 DIAGNOSIS — E11.40 TYPE 2 DIABETES MELLITUS WITH DIABETIC NEUROPATHY, WITH LONG-TERM CURRENT USE OF INSULIN (HCC): Primary | ICD-10-CM

## 2018-09-14 DIAGNOSIS — I82.5Z1 CHRONIC DEEP VEIN THROMBOSIS (DVT) OF DISTAL VEIN OF RIGHT LOWER EXTREMITY (HCC): ICD-10-CM

## 2018-09-14 DIAGNOSIS — E11.42 DIABETIC POLYNEUROPATHY ASSOCIATED WITH TYPE 2 DIABETES MELLITUS (HCC): ICD-10-CM

## 2018-09-14 DIAGNOSIS — I10 HYPERTENSION, UNSPECIFIED TYPE: ICD-10-CM

## 2018-09-14 DIAGNOSIS — Z79.4 TYPE 2 DIABETES MELLITUS WITH DIABETIC NEUROPATHY, WITH LONG-TERM CURRENT USE OF INSULIN (HCC): Primary | ICD-10-CM

## 2018-09-14 DIAGNOSIS — I48.0 PAF (PAROXYSMAL ATRIAL FIBRILLATION) (HCC): ICD-10-CM

## 2018-09-14 PROCEDURE — G8427 DOCREV CUR MEDS BY ELIG CLIN: HCPCS | Performed by: FAMILY MEDICINE

## 2018-09-14 PROCEDURE — 99214 OFFICE O/P EST MOD 30 MIN: CPT | Performed by: FAMILY MEDICINE

## 2018-09-14 PROCEDURE — 1036F TOBACCO NON-USER: CPT | Performed by: FAMILY MEDICINE

## 2018-09-14 PROCEDURE — G8417 CALC BMI ABV UP PARAM F/U: HCPCS | Performed by: FAMILY MEDICINE

## 2018-09-14 PROCEDURE — G8598 ASA/ANTIPLAT THER USED: HCPCS | Performed by: FAMILY MEDICINE

## 2018-09-14 PROCEDURE — 2022F DILAT RTA XM EVC RTNOPTHY: CPT | Performed by: FAMILY MEDICINE

## 2018-09-14 PROCEDURE — 3017F COLORECTAL CA SCREEN DOC REV: CPT | Performed by: FAMILY MEDICINE

## 2018-09-14 PROCEDURE — 3045F PR MOST RECENT HEMOGLOBIN A1C LEVEL 7.0-9.0%: CPT | Performed by: FAMILY MEDICINE

## 2018-09-14 RX ORDER — TRAZODONE HYDROCHLORIDE 50 MG/1
50 TABLET ORAL NIGHTLY
Qty: 30 TABLET | Refills: 2 | Status: SHIPPED | OUTPATIENT
Start: 2018-09-14 | End: 2019-06-18

## 2018-09-14 NOTE — PROGRESS NOTES
Called Dr. Geo Garcia at [phone] for Dr. Yadiel Mckenna to inquire if ok for patient to have an arterial study. This is due to patient history of DVT. Office voiced that patient is in the office now. Gave her [facility] and [facility] care office phone numbers to she can call me either today or Monday.  Genoveva Chandler

## 2018-09-14 NOTE — PROGRESS NOTES
sphincter with Botox, Franklyn Ortiz/Timmy, [facility]    ANOSCOPY  4/9/2007    injection of anal sphincter with Botox, Dr. Len Tsang, [address] [place] [address] ANOSCOPY  6/13/2007    injection of anal sphincter with Botox, Franklyn Meneses, [facility]    ANUS SURGERY  4/4/2006    Lateral sphincterotomy for chronic anal fissure, Dr. Matt Green [facility]  4/18/2012    anal exam and injection of Botox 100 units into anal sphincter, Laila Meneses, [facility]    APPENDECTOMY  1965   [address]    COLONOSCOPY  1/20/2004    cecal polyp, bx (no pathologic changes), Dr. Ruben Yung, [address] [place] [address] COLONOSCOPY  8/11/2006    snare polypectomy terminal ileum polyp (granulation tissue polyp) and anal polyp (inflammatory/papilla), Dr. Len Tsang, [address] [place] [address] COLONOSCOPY  3/23/2012    bx/cauterization proximal ascending colon polyp, injection of anal sphincter with Botox, Dr. Len Tsang, [address] REPLACEMENT  2003    [address]    UPPER GASTROINTESTINAL ENDOSCOPY  1/20/2004    gastritis, bx (no H pylori), Dr. Ruben Yung, [address] ENDOSCOPY N/A 6/1/2018    EGD BIOPSY performed by Jaylen Kenyon MD at [address] Marital status: Single     Spouse name: N/A    Number of children: 3    Years of education: 9     Occupational History    SSD      Social History Main Topics    Smoking status: Former Smoker     Packs/day: 1.50     Years: 25.00     Types: Cigarettes     Quit date: 12/18/2004    Smokeless tobacco: Never Used    Alcohol use No      Comment: denies caffeine    Drug use: No    Sexual activity: Not Currently     Other Topics Concern    None     Social History Narrative    None       Family History   Problem Relation Age of Onset    Heart Disease Mother     Diabetes Father     Colon Cancer Father     Other Father         BLINDNESS    Colon Cancer Sister     Diabetes Paternal Aunt  Diabetes Paternal Uncle     Diabetes Paternal Aunt     Diabetes Paternal Uncle           Current Outpatient Prescriptions   Medication Sig Dispense Refill    traZODone (DESYREL) 50 MG tablet Take 1 tablet by mouth nightly 30 tablet 2    gabapentin (NEURONTIN) 800 MG tablet TAKE 1/2 TABLET BY MOUTH 3 TIMES DAILY FOR 30 DAYS. Bryon Quiet 45 tablet 2    torsemide (DEMADEX) 20 MG tablet TAKE 1 TABLET BY MOUTH EVERY DAY 30 tablet 2    FREESTYLE LANCETS MISC USE AS DIRECTED 4 TIMES A  each 2    apixaban (ELIQUIS) 5 MG TABS tablet Take 1 tablet by mouth 2 times daily 60 tablet 2    insulin lispro (HUMALOG) 100 UNIT/ML injection vial TAKE 90 UNITS WITH BREAKFAST. TAKE 92 UNITS WITH LUNCH. TAKE 94 UNITS WITH DINNER. 900 mL 5    fexofenadine (ALLEGRA) 180 MG tablet TAKE 1 TABLET BY MOUTH DAILY 30 tablet 2    blood glucose test strips (FREESTYLE LITE) strip USE TO TEST BLOOD SUGAR FOUR TIMES DAILY 100 strip 5    Insulin Syringe-Needle U-100 (B-D INS SYR ULTRAFINE 1CC/31G) 31G X 5/16\" 1 ML MISC USE AS DIRECTED 100 each 11    metoprolol (LOPRESSOR) 100 MG tablet TAKE 1 TABLET BY MOUTH 2 TIMES DAILY 180 tablet 1    montelukast (SINGULAIR) 10 MG tablet TAKE 1 TABLET BY MOUTH NIGHTLY 90 tablet 1    spironolactone (ALDACTONE) 50 MG tablet TAKE 1/2 TABLET BY MOUTH DAILY  5    nystatin (MYCOSTATIN) 014092 UNIT/GM cream Apply topically 2 times daily.  60 g 2    escitalopram (LEXAPRO) 20 MG tablet TAKE 1 TABLET BY MOUTH DAILY 90 tablet 1    diltiazem (CARDIZEM CD) 240 MG extended release capsule TAKE 1 CAPSULE BY MOUTH 2 TIMES DAILY 180 capsule 1    cyclobenzaprine (FLEXERIL) 10 MG tablet TAKE 1 TABLET BY MOUTH 3 TIMES DAILY AS NEEDED (MUSCLE PAIN) 90 tablet 5    ferrous sulfate (NAOMY-ALEC) 325 (65 Fe) MG tablet Take 1 tablet by mouth daily (with breakfast) 30 tablet 3    insulin glargine (LANTUS) 100 UNIT/ML injection vial Inject 110 Units into the skin nightly 60 mL 3    docusate sodium (COLACE) 100 MG capsule TAKE in stool, constipation, diarrhea, nausea and vomiting. Endocrine: Negative for cold intolerance and heat intolerance. Genitourinary: Positive for difficulty urinating and frequency. Negative for dysuria and menstrual problem. Musculoskeletal: Positive for arthralgias, back pain, myalgias and neck pain. Skin: Positive for wound. Negative for color change. Allergic/Immunologic: Positive for environmental allergies. Neurological: Positive for numbness and headaches. Negative for dizziness, weakness and light-headedness. Hematological: Negative for adenopathy. Psychiatric/Behavioral: Positive for sleep disturbance. Negative for behavioral problems and dysphoric mood. The patient is not nervous/anxious. PHYSICAL EXAM  /61 (Site: Right Upper Arm, Position: Sitting, Cuff Size: Large Adult)   Pulse 92   Temp 98.6 °F (37 °C) (Oral)   Resp 18   Ht 5' 3\" (1.6 m)   Wt 295 lb (133.8 kg)   LMP  (LMP Unknown)   SpO2 (!) 87%   BMI 52.26 kg/m²   Physical Exam   Constitutional: She is oriented to person, place, and time. She appears well-developed and well-nourished. No distress. Uses walker   HENT:   Head: Normocephalic and atraumatic. Right Ear: External ear normal.   Left Ear: External ear normal.   Nose: Nose normal.   Mouth/Throat: Oropharynx is clear and moist.   Eyes: Conjunctivae and EOM are normal. No scleral icterus. Neck: Neck supple. No thyromegaly present. Cardiovascular: Normal rate, regular rhythm and normal heart sounds. No murmur heard. Pulmonary/Chest: Effort normal. No respiratory distress. She has no wheezes. Decreased breath sounds throughout   Abdominal: Soft. There is no tenderness. Genitourinary: Vagina normal.   Musculoskeletal: Normal range of motion. She exhibits edema (legs are wrapped currently still edematous). Edema noted bilaterally in the thigh   Lymphadenopathy:     She has no cervical adenopathy.    Neurological: She is alert and oriented to person, place, and time. Skin: Skin is warm and dry. Legs wrapped currently no wounds noted   Psychiatric: She has a normal mood and affect. Her behavior is normal. Judgment and thought content normal.   Nursing note and vitals reviewed. Laboratory: All laboratory and radiology results have been personally reviewed by myself    Lab Results   Component Value Date     07/10/2018    K 4.0 07/10/2018    K 5.1 04/16/2018    CL 95 07/10/2018    CO2 31 07/10/2018    BUN 33 07/10/2018    CREATININE 0.9 07/10/2018    PROT 6.2 06/03/2018    LABALBU 4.1 06/03/2018    LABALBU 4.5 11/02/2011    CALCIUM 9.9 07/10/2018    GFRAA >60 07/10/2018    LABGLOM >60 07/10/2018    GLUCOSE 145 07/10/2018    GLUCOSE 181 12/16/2011    AST 16 06/03/2018    ALT 18 06/03/2018    ALKPHOS 52 06/03/2018    BILITOT 0.3 06/03/2018    TSH 1.760 05/12/2017    CHOL 177 05/12/2017    TRIG 285 05/12/2017    HDL 34 05/16/2018    LDLCALC 98 05/16/2018    LABA1C 7.2 05/16/2018        Lab Results   Component Value Date    CHOL 177 05/12/2017    CHOL 208 (H) 08/15/2016    CHOL 239 (H) 05/12/2016     Lab Results   Component Value Date    TRIG 285 (H) 05/12/2017    TRIG 336 (H) 08/15/2016    TRIG 599 (H) 05/12/2016     Lab Results   Component Value Date    HDL 34 05/16/2018    HDL 37 05/12/2017    HDL 40 08/15/2016     Lab Results   Component Value Date    LDLCALC 98 05/16/2018    LDLCALC 83 05/12/2017    LDLCALC 101 (H) 08/15/2016       Lab Results   Component Value Date    LABA1C 7.2 05/16/2018    LABA1C 7.2 12/14/2017    LABA1C 8.7 09/18/2017     Lab Results   Component Value Date    LABMICR <12.0 05/12/2017    LDLCALC 98 05/16/2018    CREATININE 0.9 07/10/2018       ASSESSMENT/PLAN:     Diagnosis Orders   1. Type 2 diabetes mellitus with diabetic neuropathy, with long-term current use of insulin ([address]  POCT glycosylated hemoglobin (Hb A1C)    [facility] Endocrinology- Maria Victoria Iraheta MD   2.  PAF (paroxysmal atrial fibrillation) ([facility]     3. Diabetic polyneuropathy associated with type 2 diabetes mellitus [facility])  [facility] Endocrinology- Danielle Simons MD   4. Chronic diastolic congestive heart failure ([facility]     5. Chronic deep vein thrombosis (DVT) of distal vein of right lower extremity (HCC)  US LOWER EXTREMITY BILATERAL VEIN MAPPING W DVT    VL DEEP BILATERAL LIMITED 1-2 LEVELS   6. Hypertension, unspecified type       A1c elevated at 9.0. She is on very high dose of insulin - will refer to endocrine at this point as she is already on high dose insulin. She may also obtain LE US/deep to evaluate for pad. She is still going to wound care. bp within normal limits continue current therapy. As for insomnia, trazodone sent into pharmacy. Side effects of medication were reviewed with patient. Insurance would not pay for [place]. Otherwise she does feel improved overall. Problem list reviewed and simplified/updated  HM reviewed today and counseled as appropriate    Call or go to ED immediately if symptoms worsen or persist.  Future Appointments  Date Time Provider Jason Muir   9/20/2018 2:30 PM [address] [facility] WOUND None   10/16/2018 2:30 PM Dede Borges MD [facility]   12/13/2018 1:00 PM DO Anton Roeelayne [facility]     Or sooner if necessary.       Educational materials and/or home exercises printed for patient's review and were included in patient instructions on his/her After Visit Summary and given to patient at the end of visit.       Counseled regarding above diagnosis, including possible risks and complications,  especially if left uncontrolled.     Counseled regarding the possible side effects, risks, benefits and alternatives to treatment; patient and/or guardian verbalizes understanding, agrees, feels comfortable with and wishes to proceed with above treatment plan.     Advised patient to call with any new medication issues, and read all Rx info from pharmacy to assure aware of all possible risks and side effects of medication before taking.     Reviewed age and gender appropriate health screening exams and vaccinations. Advised patient regarding importance of keeping up with recommended health maintenance and to schedule as soon as possible if overdue, as this is important in assessing for undiagnosed pathology, especially cancer, as well as protecting against potentially harmful/life threatening disease.          Patient and/or guardian verbalizes understanding and agrees with above counseling, assessment and plan.     All questions answered. Bryce Luong,   9/14/18    NOTE: This report was transcribed using voice recognition software.  Every effort was made to ensure accuracy; however, inadvertent computerized transcription errors may be present

## 2018-09-17 ASSESSMENT — ENCOUNTER SYMPTOMS
CONSTIPATION: 0
SINUS PRESSURE: 0
EYE PAIN: 0
SINUS PAIN: 0
NAUSEA: 0
ABDOMINAL PAIN: 0
SORE THROAT: 0
VOMITING: 0
COUGH: 0
DIARRHEA: 0
COLOR CHANGE: 0
BACK PAIN: 1
SHORTNESS OF BREATH: 0
BLOOD IN STOOL: 0

## 2018-09-20 ENCOUNTER — HOSPITAL ENCOUNTER (OUTPATIENT)
Dept: WOUND CARE | Age: 63
Discharge: HOME | End: 2018-09-20
Payer: COMMERCIAL

## 2018-09-20 VITALS
SYSTOLIC BLOOD PRESSURE: 138 MMHG | DIASTOLIC BLOOD PRESSURE: 70 MMHG | RESPIRATION RATE: 18 BRPM | HEART RATE: 88 BPM | WEIGHT: 293 LBS | BODY MASS INDEX: 52.26 KG/M2 | TEMPERATURE: 98.4 F

## 2018-09-20 PROCEDURE — 11045 DBRDMT SUBQ TISS EACH ADDL: CPT

## 2018-09-20 PROCEDURE — 11042 DBRDMT SUBQ TIS 1ST 20SQCM/<: CPT

## 2018-09-20 NOTE — PROGRESS NOTES
[facility] Followup Visit Note    Referring Physician : Carol Ann Martinez DO  Teri Jefferson  MEDICAL RECORD NUMBER:  [id number]  AGE: 57 y.o. GENDER: female  : 1955  EPISODE DATE:  2018    Subjective:     Chief Complaint   Patient presents with    Wound Check     MULTIPLE WOUNDS      HISTORY of PRESENT ILLNESS HPI   Teri Jefferson is a 57 y.o. female who presents today for wound/ulcer evaluation. History of Wound Context:  1 1/2 months     Wound/Ulcer Pain Timing/Severity: intermittent  Quality of pain: aching  Severity:  2 / 10   Modifying Factors: Pain worsens with walking  Associated Signs/Symptoms: edema, erythema and drainage    Ulcer Identification:  Ulcer Type: venous  Contributing Factors: edema, venous stasis and lymphedema    Diabetic/Pressure/Non Pressure Ulcers only:  Ulcer: N/A    Wound: N/A        PAST MEDICAL HISTORY      Diagnosis Date    Anal fissure     Anemia 10/6/2017    Arthritis     Asthma     Atrial fibrillation ([facility]     Blood transfusion     long time no reaction    CAD (coronary artery disease)     Disc disorder     Fall due to stumbling 10/6/2017    GERD (gastroesophageal reflux disease)     GERD (gastroesophageal reflux disease)     Hyperlipidemia     Hypertension     Inappropriate sinus tachycardia     Low hemoglobin 2018    LVH (left ventricular hypertrophy)     moderate    Lymphadenitis, chronic 2018    Occipital neuralgia 2011    Respiratory acidosis 10/7/2017    Sinus tachycardia 2015    Type II or unspecified type diabetes mellitus without mention of complication, not stated as uncontrolled      Past Surgical History:   Procedure Laterality Date    ANOSCOPY  10/25/2006    injection of anal sphincter with Botox, Franklyn Ortiz/Timmy, [facility]    ANOSCOPY  2007    injection of anal sphincter with Botox, Dr. Mihaela Cohn, [address] [place] [address] ANOSCOPY  2007    injection of anal sphincter with Botox, Franklyn Ortiz/Timmy, [facility]    ANUS self     ECF/HHA: [facility] VISIT ON Monday TO CHANGE DRESSINGS AS ORDERED BELOW.     Dressing Orders:CLEANSE ULCERS LEFT AND RIGHT LOWER EXTREMITIES WITH NORMAL SALINE, APPLY GENTAMYCIN OINTMENT. APPLY  ALGINATE AG, DRY DRESSING AND SECURE WITH COBAN 2.  LEAVE THIS IN PLACE UNTIL Monday.    THEN HOME CARE TO CHANGE DRESSING ON MONDAY     Treatment Orders:     EAT DIET HIGH IN PROTEIN AND VITAMIN C     TAKE A MULTIVITAMIN DAIILY IF OKAY WITH PRIMARY CARE.     [facility] followup visit ____________1 WEEK___DR BATES______________  (Please note your next appointment above and if you are unable to keep, kindly give a 24 hour notice. Thank you.)    Physician signature:__________________________      If you experience any of the following, please call the [address] during business hours:    * Increase in Pain  * Temperature over 101  * Increase in drainage from your wound  * Drainage with a foul odor  * Bleeding  * Increase in swelling  * Need for compression bandage changes due to slippage, breakthrough drainage. If you need medical attention outside of the business hours of the [address] please contact your PCP or go to the nearest emergency room.         Electronically signed by Arik Calvert DPM on 9/20/2018 at 3:29 PM

## 2018-09-27 ENCOUNTER — HOSPITAL ENCOUNTER (OUTPATIENT)
Dept: WOUND CARE | Age: 63
Discharge: HOME | End: 2018-09-27
Payer: COMMERCIAL

## 2018-09-27 VITALS
SYSTOLIC BLOOD PRESSURE: 130 MMHG | BODY MASS INDEX: 49.61 KG/M2 | TEMPERATURE: 97.9 F | DIASTOLIC BLOOD PRESSURE: 70 MMHG | WEIGHT: 280 LBS | HEART RATE: 84 BPM | RESPIRATION RATE: 18 BRPM | HEIGHT: 63 IN

## 2018-09-27 PROCEDURE — 11042 DBRDMT SUBQ TIS 1ST 20SQCM/<: CPT

## 2018-09-27 PROCEDURE — 11045 DBRDMT SUBQ TISS EACH ADDL: CPT

## 2018-09-27 NOTE — PROGRESS NOTES
[facility] Followup Visit Note    Referring Physician : Erickson Hoyt DO Lauren Bullock  MEDICAL RECORD NUMBER:  [id number]  AGE: 57 y.o. GENDER: female  : 1955  EPISODE DATE:  2018    Subjective:     Chief Complaint   Patient presents with    Wound Check     right and left leg wounds      HISTORY of PRESENT ILLNESS HPI   Lauren Bullock is a 57 y.o. female who presents today for wound/ulcer evaluation. History of Wound Context:  3 months     Wound/Ulcer Pain Timing/Severity: intermittent  Quality of pain: aching  Severity:  2 / 10   Modifying Factors: Pain worsens with walking  Associated Signs/Symptoms: edema, erythema, drainage and numbness    Ulcer Identification:  Ulcer Type: venous  Contributing Factors: edema, venous stasis and lymphedema    Diabetic/Pressure/Non Pressure Ulcers only:  Ulcer: Non-Pressure ulcer, fat layer exposed    Wound: N/A        PAST MEDICAL HISTORY      Diagnosis Date    Anal fissure     Anemia 10/6/2017    Arthritis     Asthma     Atrial fibrillation ([address]     Blood transfusion     long time no reaction    CAD (coronary artery disease)     Disc disorder     Fall due to stumbling 10/6/2017    GERD (gastroesophageal reflux disease)     GERD (gastroesophageal reflux disease)     Hyperlipidemia     Hypertension     Inappropriate sinus tachycardia     Low hemoglobin 2018    LVH (left ventricular hypertrophy)     moderate    Lymphadenitis, chronic 2018    Occipital neuralgia 2011    Respiratory acidosis 10/7/2017    Sinus tachycardia 2015    Type II or unspecified type diabetes mellitus without mention of complication, not stated as uncontrolled      Past Surgical History:   Procedure Laterality Date    ANOSCOPY  10/25/2006    injection of anal sphincter with Botox, Franklyn Ortiz/Timmy, [facility]    ANOSCOPY  2007    injection of anal sphincter with Botox, Dr. Mimi Rivera, [address] [place] [address] ANOSCOPY  2007    injection of anal sphincter with Botox, Franklyn Reyes, [facility]    ANUS SURGERY  4/4/2006    Lateral sphincterotomy for chronic anal fissure, Tammi Billings  4/18/2012    anal exam and injection of Botox 100 units into anal sphincter, Laila Reyes, [facility]    APPENDECTOMY  1965   [address]    COLONOSCOPY  1/20/2004    cecal polyp, bx (no pathologic changes), Dr. Tanner Ford, [facility] COLONOSCOPY  8/11/2006    snare polypectomy terminal ileum polyp (granulation tissue polyp) and anal polyp (inflammatory/papilla), Dr. Patrica Hull, [facility] COLONOSCOPY  3/23/2012    bx/cauterization proximal ascending colon polyp, injection of anal sphincter with Botox, Dr. Patrica Hull, [address] REPLACEMENT  2003    [address]    UPPER GASTROINTESTINAL ENDOSCOPY  1/20/2004    gastritis, bx (no H pylori), Dr. Tanner Ford, [address] ENDOSCOPY N/A 6/1/2018    EGD BIOPSY performed by Suzanne Gonzalez MD at [facility] ENDOSCOPY     Family History   Problem Relation Age of Onset    Heart Disease Mother     Diabetes Father     Colon Cancer Father     Other Father         BLINDNESS    Colon Cancer Sister     Diabetes Paternal Aunt     Diabetes Paternal Uncle     Diabetes Paternal Aunt     Diabetes Paternal Uncle      Social History   Substance Use Topics    Smoking status: Former Smoker     Packs/day: 1.50     Years: 25.00     Types: Cigarettes     Quit date: 12/18/2004    Smokeless tobacco: Never Used    Alcohol use No      Comment: denies caffeine     Allergies   Allergen Reactions    Statins Other (See Comments)     Muscle pains     Current Outpatient Prescriptions on File Prior to Encounter   Medication Sig Dispense Refill    traZODone (DESYREL) 50 MG tablet Take 1 tablet by mouth nightly 30 tablet 2    gabapentin (NEURONTIN) 800 MG tablet TAKE 1/2 TABLET BY MOUTH 3 TIMES DAILY FOR 30 DAYS. Damon Melendrez  45 tablet 2    torsemide (DEMADEX) 20 MG tablet TAKE 1 TABLET BY MOUTH EVERY DAY 30 tablet 2    FREESTYLE LANCETS MISC USE AS DIRECTED 4 TIMES A  each 2    apixaban (ELIQUIS) 5 MG TABS tablet Take 1 tablet by mouth 2 times daily 60 tablet 2    insulin lispro (HUMALOG) 100 UNIT/ML injection vial TAKE 90 UNITS WITH BREAKFAST. TAKE 92 UNITS WITH LUNCH. TAKE 94 UNITS WITH DINNER. 900 mL 5    fexofenadine (ALLEGRA) 180 MG tablet TAKE 1 TABLET BY MOUTH DAILY 30 tablet 2    blood glucose test strips (FREESTYLE LITE) strip USE TO TEST BLOOD SUGAR FOUR TIMES DAILY 100 strip 5    Insulin Syringe-Needle U-100 (B-D INS SYR ULTRAFINE 1CC/31G) 31G X 5/16\" 1 ML MISC USE AS DIRECTED 100 each 11    metoprolol (LOPRESSOR) 100 MG tablet TAKE 1 TABLET BY MOUTH 2 TIMES DAILY 180 tablet 1    montelukast (SINGULAIR) 10 MG tablet TAKE 1 TABLET BY MOUTH NIGHTLY 90 tablet 1    spironolactone (ALDACTONE) 50 MG tablet TAKE 1/2 TABLET BY MOUTH DAILY  5    nystatin (MYCOSTATIN) 196514 UNIT/GM cream Apply topically 2 times daily. 60 g 2    escitalopram (LEXAPRO) 20 MG tablet TAKE 1 TABLET BY MOUTH DAILY 90 tablet 1    diltiazem (CARDIZEM CD) 240 MG extended release capsule TAKE 1 CAPSULE BY MOUTH 2 TIMES DAILY 180 capsule 1    cyclobenzaprine (FLEXERIL) 10 MG tablet TAKE 1 TABLET BY MOUTH 3 TIMES DAILY AS NEEDED (MUSCLE PAIN) 90 tablet 5    ferrous sulfate (NAOMY-ALEC) 325 (65 Fe) MG tablet Take 1 tablet by mouth daily (with breakfast) 30 tablet 3    insulin glargine (LANTUS) 100 UNIT/ML injection vial Inject 110 Units into the skin nightly 60 mL 3    docusate sodium (COLACE) 100 MG capsule TAKE 1 CAPSULE BY MOUTH 3 TIMES DAILY 90 capsule 0    fluticasone (FLONASE) 50 MCG/ACT nasal spray USE 2 SPRAYS BY NASAL ROUTE DAILY 1 Bottle 5    [employer] ALCOHOL SWABS PADS USE 3 TIMES A  each 5    oxyCODONE-acetaminophen (PERCOCET)  MG per tablet Take 1 tablet by mouth every 4 hours as needed for Pain.       OXYGEN Inhale 4 L into the lungs continuous       lansoprazole (PREVACID) 30 MG delayed release capsule TAKE 1 CAPSULE BY MOUTH DAILY 30 capsule 5    fenofibrate (TRICOR) 145 MG tablet TAKE 1 TABLET BY MOUTH DAILY 30 tablet 5    Elastic Bandages & Supports MISC 20-30 mmHg bilateral compression stockings  Size to fit  Dx:  Edema  Knee high 2 each 0    VENTOLIN  (90 Base) MCG/ACT inhaler INHALE 2 PUFFS INTO LUNGS EVERY 4 HOURS AS NEEDED FOR WHEEZING OR SHORTNESS OF BREATH 18 Inhaler 5    DULERA 200-5 MCG/ACT inhaler INHALE 2 PUFFS INTO LUNGS TWICE A DAY RINSE MOUTH AFTER USE 3 Inhaler 1    ipratropium-albuterol (DUONEB) 0.5-2.5 (3) MG/3ML SOLN nebulizer solution Inhale 3 mLs into the lungs every 4 hours. 360 mL 5     No current facility-administered medications on file prior to encounter. REVIEW OF SYSTEMS See HPI    Objective:    /70   Pulse 84   Temp 97.9 °F (36.6 °C) (Oral)   Resp 18   Ht 5' 3\" (1.6 m)   Wt 280 lb (127 kg)   LMP  (LMP Unknown)   BMI 49.60 kg/m²   Wt Readings from Last 3 Encounters:   09/27/18 280 lb (127 kg)   09/20/18 295 lb (133.8 kg)   09/14/18 295 lb (133.8 kg)     PHYSICAL EXAM  CONSTITUTIONAL:   Awake, alert, cooperative   EYES:  lids and lashes normal   ENT: external ears and nose without lesions   NECK:  supple, symmetrical, trachea midline   SKIN:  Open wound Present    Assessment:     Problem List Items Addressed This Visit     None        Procedure Note  Indications:  Based on my examination of this patient's wound(s)/ulcer(s) today, debridement is required to promote healing and evaluate the wound base. Performed by: Freeman Briggs DPM    Consent obtained:  Yes    Time out taken:  Yes    Pain Control: Anesthetic  Anesthetic: 2% Lidocaine Gel Topical     Debridement:Excisional Debridement    Using #15 blade scalpel the wound(s)/ulcer(s) was/were sharply debrided down through and including the removal of subcutaneous tissue.         Devitalized Tissue Debrided:  fibrin, biofilm, slough and necrotic/eschar to stimulate bleeding to promote healing, post debridement good bleeding base and wound edges noted    Pre Debridement Measurements:  Are located in the Wound/Ulcer Documentation Flow Sheet    Wound/Ulcer #: 1, 2, 3, 4 and 5    Post Debridement Measurements:  Wound/Ulcer Descriptions are Pre Debridement except measurements:    Wound 07/19/18 Venous ulcer Pretibial Right #1 blocked acq: 6-19-18 (Active)   Wound Image   8/30/2018  3:17 PM   Wound Type Wound 7/19/2018  3:46 PM   Wound Diabetic Reeder 1 7/19/2018  3:46 PM   Dressing Status Clean;Dry; Intact 8/30/2018  4:03 PM   Dressing Changed Changed/New 8/30/2018  4:03 PM   Dressing/Treatment Silver dressing 8/30/2018  4:03 PM   Wound Cleansed Rinsed/Irrigated with saline 8/30/2018  4:03 PM   Wound Length (cm) 9.1 cm 9/27/2018  3:55 PM   Wound Width (cm) 19 cm 9/27/2018  3:55 PM   Wound Depth (cm)  0.1 9/27/2018  3:55 PM   Calculated Wound Size (cm^2) (l*w) 172.9 cm^2 9/27/2018  3:55 PM   Change in Wound Size % (l*w) -182.52 9/27/2018  3:55 PM   Wound Assessment Pink;Red; White 9/27/2018  3:55 PM   Drainage Amount Copious 9/27/2018  3:55 PM   Drainage Description Serosanguinous; Yellow 9/27/2018  3:55 PM   Odor None 9/27/2018  3:55 PM   Ely-wound Assessment Pink; White 9/27/2018  3:55 PM   Time out Yes 9/20/2018  3:36 PM   Procedural Pain 10 9/20/2018  3:36 PM   Post procedural Pain 3 9/20/2018  3:36 PM   Number of days: 70       Wound 07/19/18 Venous ulcer Pretibial Left #2 blocked acq:  6-19-18/ converted 2+3 =6 (Active)   Wound Image   8/30/2018  3:17 PM   Wound Type Wound 7/19/2018  3:46 PM   Wound Diabetic Reeder 1 7/19/2018  3:46 PM   Dressing Status Clean;Dry; Intact 8/30/2018  4:03 PM   Dressing Changed Changed/New 8/30/2018  4:03 PM   Dressing/Treatment Silver dressing 8/30/2018  4:03 PM   Wound Cleansed Rinsed/Irrigated with saline 8/30/2018  4:03 PM   Wound Length (cm) 9.5 cm 9/20/2018  3:36 PM   Wound Width (cm) 10 cm Dressing/Treatment Dry dressing 7/26/2018  4:20 PM   Wound Cleansed Wound cleanser 8/2/2018  4:27 PM   Wound Length (cm) 0.5 cm 8/2/2018  4:50 PM   Wound Width (cm) 1.6 cm 8/2/2018  4:50 PM   Wound Depth (cm)  0 8/2/2018  4:50 PM   Calculated Wound Size (cm^2) (l*w) 0.8 cm^2 8/2/2018  4:50 PM   Change in Wound Size % (l*w) 73.33 8/2/2018  4:50 PM   Wound Assessment Pink 8/2/2018  4:27 PM   Drainage Amount Large 8/2/2018  4:27 PM   Drainage Description Brown;Green;Tan;Yellow 8/2/2018  4:27 PM   Odor None 7/26/2018  3:28 PM   Ely-wound Assessment Maceration;Pink 8/2/2018  4:27 PM   Time out Yes 7/26/2018  3:58 PM   Number of days: 70       Wound 08/02/18 Venous ulcer Leg Right; Lower; Posterior wound # 5 right post calf, acquired 7-30-18  (#4 in iheal) (Active)   Wound Image   8/30/2018  3:17 PM   Wound Type Wound 8/9/2018  4:10 PM   Wound Venous 8/9/2018  4:10 PM   Dressing Status Clean;Dry; Intact 8/30/2018  4:03 PM   Dressing Changed Changed/New 8/30/2018  4:03 PM   Dressing/Treatment Silver dressing 8/30/2018  4:03 PM   Wound Cleansed Rinsed/Irrigated with saline 8/30/2018  4:03 PM   Wound Length (cm) 5.8 cm 9/27/2018  3:55 PM   Wound Width (cm) 7 cm 9/27/2018  3:55 PM   Wound Depth (cm)  0.1 9/27/2018  3:55 PM   Calculated Wound Size (cm^2) (l*w) 40.6 cm^2 9/27/2018  3:55 PM   Change in Wound Size % (l*w) -130.68 9/27/2018  3:55 PM   Wound Assessment Pink; White;Yellow 9/27/2018  3:55 PM   Drainage Amount Large 9/27/2018  3:55 PM   Drainage Description Serosanguinous; Serous; Yellow 9/27/2018  3:55 PM   Odor None 9/20/2018  2:52 PM   Ely-wound Assessment Pink; White 9/27/2018  3:55 PM   Time out Yes 9/20/2018  3:36 PM   Procedural Pain 10 9/20/2018  3:36 PM   Post procedural Pain 3 9/20/2018  3:36 PM   Number of days: 55       Wound 09/27/18 Venous ulcer Leg Left #6  (Active)   Wound Length (cm) 13.5 cm 9/27/2018  4:08 PM   Wound Width (cm) 29.5 cm 9/27/2018  4:08 PM   Wound Depth (cm)  0.1 9/27/2018  4:08 PM Calculated Wound Size (cm^2) (l*w) 398.25 cm^2 9/27/2018  4:08 PM   Wound Assessment Pink;Red; White;Yellow 9/27/2018  4:08 PM   Drainage Amount Large 9/27/2018  4:08 PM   Drainage Description Serosanguinous; Serous 9/27/2018  4:08 PM   Odor None 9/27/2018  4:08 PM   Ely-wound Assessment Maceration 9/27/2018  4:08 PM   Number of days: 0     Percent of Wound/Ulcer Debrided: 100%    Total Surface Area Debrided:  780 sq cm     Estimated Blood Loss:  Minimal  Hemostasis Achieved:  by pressure    Procedural Pain:  2 / 10   Post Procedural Pain:  3 / 10     Response to treatment:  Well tolerated by patient. Plan:   Treatment Note please see attached Discharge Instructions    Written patient dismissal instructions given to patient and signed by patient or POA. Discharge Instructions       Visit Discharge/Physician Orders     Discharge condition: Stable     Assessment of pain at discharge:minimal     Anesthetic used: 2% lidocaine     Discharge to: Home     Left via:Private automobile     Accompanied by: accompanied by self     ECF/HHA: [place] [id number].       Dressing Orders:CLEANSE ULCERS LEFT AND RIGHT LOWER EXTREMITIES WITH NORMAL SALINE. APPLY  ALGINATE AG, DRY DRESSING AND SECURE [address] Mariaojse Munguia PLEASE, PLEASE CHANGE DRESSINGS EVERY DAY THIS WEEK. WOUNDS, LEGS ARE TOO WET.    Treatment Orders:     EAT DIET HIGH IN PROTEIN AND VITAMIN C     TAKE A MULTIVITAMIN DAIILY IF OKAY WITH PRIMARY CARE.     [facility] followup visit ____________1 WEEK___DR BATES______________  (Please note your next appointment above and if you are unable to keep, kindly give a 24 hour notice.  Thank you.)    Physician signature:__________________________      If you experience any of the following, please call the [address] during business hours:    * Increase in Pain  * Temperature over 101  * Increase in drainage from your wound    * Bleeding  * Increase in swelling  * Need for compression bandage changes due to slippage, breakthrough drainage. If you need medical attention outside of the business hours of the [address] [place] [address] please contact your PCP or go to the nearest emergency room.         Electronically signed by Leopoldo Epstein DPM on 9/27/2018 at 4:33 PM

## 2018-10-04 ENCOUNTER — HOSPITAL ENCOUNTER (OUTPATIENT)
Dept: WOUND CARE | Age: 63
Discharge: HOME | End: 2018-10-04
Payer: COMMERCIAL

## 2018-10-04 VITALS — DIASTOLIC BLOOD PRESSURE: 68 MMHG | RESPIRATION RATE: 20 BRPM | HEART RATE: 90 BPM | SYSTOLIC BLOOD PRESSURE: 128 MMHG

## 2018-10-04 PROCEDURE — 11045 DBRDMT SUBQ TISS EACH ADDL: CPT

## 2018-10-04 PROCEDURE — 11042 DBRDMT SUBQ TIS 1ST 20SQCM/<: CPT

## 2018-10-04 NOTE — PROGRESS NOTES
Wound Depth (cm)  0.1 10/4/2018  3:49 PM   Calculated Wound Size (cm^2) (l*w) 390 cm^2 10/4/2018  3:49 PM   Change in Wound Size % (l*w) 2.07 10/4/2018  3:49 PM   Wound Assessment Pink;Red; White;Yellow 10/4/2018  3:49 PM   Drainage Amount Large 10/4/2018  3:49 PM   Drainage Description Serous 10/4/2018  3:49 PM   Odor None 10/4/2018  3:49 PM   Ely-wound Assessment Maceration 10/4/2018  3:49 PM   Time out Yes 9/27/2018  4:34 PM   Procedural Pain 0 9/27/2018  4:34 PM   Post procedural Pain 0 9/27/2018  4:34 PM   Number of days: 7     Percent of Wound/Ulcer Debrided: 100%    Total Surface Area Debrided:  590 sq cm     Estimated Blood Loss:  Minimal  Hemostasis Achieved:  by pressure    Procedural Pain:  2 / 10   Post Procedural Pain:  3 / 10     Response to treatment:  Well tolerated by patient. Plan:   Treatment Note please see attached Discharge Instructions    Written patient dismissal instructions given to patient and signed by patient or POA. Discharge Instructions       Visit Discharge/Physician Orders     Discharge condition: Stable     Assessment of pain at discharge:minimal     Anesthetic used: 2% lidocaine     Discharge to: Home     Left via:Private automobile     Accompanied by: accompanied by self     ECF/HHA: [place] [id number].       Dressing Orders:CLEANSE ULCERS LEFT AND RIGHT LOWER EXTREMITIES WITH NORMAL SALINE. APPLY  ALGINATE AG, DRY DRESSING AND SECURE [address] Jena Bar PLEASE, PLEASE CHANGE DRESSINGS EVERY DAY THIS WEEK. WOUNDS, LEGS ARE TOO WET.    Treatment Orders:     EAT DIET HIGH IN PROTEIN AND VITAMIN C     TAKE A MULTIVITAMIN DAIILY IF OKAY WITH PRIMARY CARE.     [facility] followup visit ____________1 WEEK___DR BATES______________  (Please note your next appointment above and if you are unable to keep, kindly give a 24 hour notice.  Thank you.)    Physician signature:__________________________      If you experience any of the following, please call the [address] [place] [address]

## 2018-10-09 ENCOUNTER — HOSPITAL ENCOUNTER (OUTPATIENT)
Dept: INTERVENTIONAL RADIOLOGY/VASCULAR | Age: 63
Discharge: HOME | End: 2018-10-11
Payer: COMMERCIAL

## 2018-10-09 ENCOUNTER — HOSPITAL ENCOUNTER (OUTPATIENT)
Dept: WOUND CARE | Age: 63
Discharge: HOME | End: 2018-10-09
Payer: COMMERCIAL

## 2018-10-09 ENCOUNTER — HOSPITAL ENCOUNTER (OUTPATIENT)
Dept: ULTRASOUND IMAGING | Age: 63
Discharge: HOME | End: 2018-10-11
Payer: COMMERCIAL

## 2018-10-09 VITALS
TEMPERATURE: 98 F | RESPIRATION RATE: 20 BRPM | SYSTOLIC BLOOD PRESSURE: 120 MMHG | HEART RATE: 88 BPM | DIASTOLIC BLOOD PRESSURE: 70 MMHG

## 2018-10-09 DIAGNOSIS — I82.5Z1 CHRONIC DEEP VEIN THROMBOSIS (DVT) OF DISTAL VEIN OF RIGHT LOWER EXTREMITY (HCC): ICD-10-CM

## 2018-10-09 DIAGNOSIS — I73.9 PVD (PERIPHERAL VASCULAR DISEASE) (HCC): ICD-10-CM

## 2018-10-09 PROCEDURE — 93922 UPR/L XTREMITY ART 2 LEVELS: CPT

## 2018-10-09 PROCEDURE — 29581 APPL MULTLAYER CMPRN SYS LEG: CPT

## 2018-10-09 PROCEDURE — 93970 EXTREMITY STUDY: CPT

## 2018-10-11 ENCOUNTER — HOSPITAL ENCOUNTER (OUTPATIENT)
Dept: WOUND CARE | Age: 63
Discharge: HOME | End: 2018-10-11
Payer: COMMERCIAL

## 2018-10-11 VITALS
SYSTOLIC BLOOD PRESSURE: 112 MMHG | TEMPERATURE: 98.2 F | HEART RATE: 88 BPM | HEIGHT: 63 IN | WEIGHT: 280 LBS | RESPIRATION RATE: 20 BRPM | BODY MASS INDEX: 49.61 KG/M2 | DIASTOLIC BLOOD PRESSURE: 60 MMHG

## 2018-10-11 PROCEDURE — 11045 DBRDMT SUBQ TISS EACH ADDL: CPT

## 2018-10-11 PROCEDURE — 87077 CULTURE AEROBIC IDENTIFY: CPT

## 2018-10-11 PROCEDURE — 87186 SC STD MICRODIL/AGAR DIL: CPT

## 2018-10-11 PROCEDURE — 11042 DBRDMT SUBQ TIS 1ST 20SQCM/<: CPT

## 2018-10-11 PROCEDURE — 87070 CULTURE OTHR SPECIMN AEROBIC: CPT

## 2018-10-11 PROCEDURE — 87075 CULTR BACTERIA EXCEPT BLOOD: CPT

## 2018-10-11 ASSESSMENT — PAIN DESCRIPTION - PROGRESSION: CLINICAL_PROGRESSION: NOT CHANGED

## 2018-10-11 ASSESSMENT — PAIN DESCRIPTION - FREQUENCY: FREQUENCY: INTERMITTENT

## 2018-10-11 ASSESSMENT — PAIN SCALES - GENERAL: PAINLEVEL_OUTOF10: 8

## 2018-10-11 ASSESSMENT — PAIN DESCRIPTION - PAIN TYPE: TYPE: CHRONIC PAIN

## 2018-10-11 ASSESSMENT — PAIN DESCRIPTION - LOCATION: LOCATION: LEG

## 2018-10-11 ASSESSMENT — PAIN DESCRIPTION - ORIENTATION: ORIENTATION: RIGHT;LEFT

## 2018-10-11 ASSESSMENT — PAIN DESCRIPTION - DESCRIPTORS: DESCRIPTORS: BURNING

## 2018-10-11 ASSESSMENT — PAIN DESCRIPTION - ONSET: ONSET: ON-GOING

## 2018-10-11 NOTE — PROGRESS NOTES
healing, post debridement good bleeding base and wound edges noted    Pre Debridement Measurements:  Are located in the Wound/Ulcer Documentation Flow Sheet    Wound/Ulcer #: 1, 4, 5 and 6    Post Debridement Measurements:  Wound/Ulcer Descriptions are Pre Debridement except measurements:    Wound 07/19/18 Venous ulcer Pretibial Right #1 blocked acq: 6-19-18 (Active)   Wound Image   10/11/2018  3:26 PM   Wound Type Wound 7/19/2018  3:46 PM   Wound Diabetic Reeder 1 7/19/2018  3:46 PM   Dressing Status Clean;Dry; Intact 10/9/2018 12:07 PM   Dressing Changed Changed/New 10/9/2018 12:07 PM   Dressing/Treatment Silver dressing;Alginate;Dry dressing 10/9/2018 12:07 PM   Wound Cleansed Rinsed/Irrigated with saline 10/9/2018 12:07 PM   Wound Length (cm) 9 cm 10/4/2018  4:33 PM   Wound Width (cm) 18.5 cm 10/4/2018  4:33 PM   Wound Depth (cm)  0.1 10/4/2018  4:33 PM   Calculated Wound Size (cm^2) (l*w) 166.5 cm^2 10/4/2018  4:33 PM   Change in Wound Size % (l*w) -172.06 10/4/2018  4:33 PM   Wound Assessment Pink;Red; White 10/4/2018  3:49 PM   Drainage Amount Copious 10/4/2018  3:49 PM   Drainage Description Yellow;Serous 10/4/2018  3:49 PM   Odor None 10/4/2018  3:49 PM   Ely-wound Assessment Pink; White; Maceration 10/4/2018  3:49 PM   Time out Yes 10/4/2018  4:33 PM   Procedural Pain 0 10/4/2018  4:33 PM   Post procedural Pain 0 10/4/2018  4:33 PM   Number of days: 84       Wound 07/19/18 Venous ulcer Leg Right;Medial;Lower #4 acq: 6-19-18 (Active)   Wound Image   7/19/2018  3:46 PM   Wound Diabetic Reeder 1 7/19/2018  3:46 PM   Dressing Status Clean; Intact;Dry 7/26/2018  4:20 PM   Dressing Changed Changed/New 7/26/2018  4:20 PM   Dressing/Treatment Dry dressing 7/26/2018  4:20 PM   Wound Cleansed Wound cleanser 8/2/2018  4:27 PM   Wound Length (cm) 0.5 cm 8/2/2018  4:50 PM   Wound Width (cm) 1.6 cm 8/2/2018  4:50 PM   Wound Depth (cm)  0 8/2/2018  4:50 PM   Calculated Wound Size (cm^2) (l*w) 0.8 cm^2 8/2/2018  4:50 PM keep, kindly give a 24 hour notice. Thank you.)    Physician signature:__________________________      If you experience any of the following, please call the [address] during business hours:    * Increase in Pain  * Temperature over 101  * Increase in drainage from your wound  * Drainage with a foul odor  * Bleeding  * Increase in swelling  * Need for compression bandage changes due to slippage, breakthrough drainage. If you need medical attention outside of the business hours of the [address] please contact your PCP or go to the nearest emergency room.         Electronically signed by Lyle Oreilly DPM on 10/11/2018 at 3:49 PM

## 2018-10-13 ENCOUNTER — APPOINTMENT (OUTPATIENT)
Dept: GENERAL RADIOLOGY | Age: 63
End: 2018-10-13
Payer: COMMERCIAL

## 2018-10-13 ENCOUNTER — HOSPITAL ENCOUNTER (EMERGENCY)
Age: 63
Discharge: HOME | End: 2018-10-13
Attending: EMERGENCY MEDICINE
Payer: COMMERCIAL

## 2018-10-13 VITALS
WEIGHT: 275 LBS | HEART RATE: 102 BPM | HEIGHT: 63 IN | TEMPERATURE: 98.7 F | OXYGEN SATURATION: 97 % | RESPIRATION RATE: 20 BRPM | DIASTOLIC BLOOD PRESSURE: 51 MMHG | BODY MASS INDEX: 48.73 KG/M2 | SYSTOLIC BLOOD PRESSURE: 143 MMHG

## 2018-10-13 DIAGNOSIS — R53.83 OTHER FATIGUE: ICD-10-CM

## 2018-10-13 DIAGNOSIS — R50.9 FEVER, UNSPECIFIED FEVER CAUSE: Primary | ICD-10-CM

## 2018-10-13 DIAGNOSIS — E86.0 DEHYDRATION: ICD-10-CM

## 2018-10-13 LAB
ALBUMIN SERPL-MCNC: 3.5 G/DL (ref 3.5–5.2)
ALP BLD-CCNC: 66 U/L (ref 35–104)
ALT SERPL-CCNC: 8 U/L (ref 0–32)
AMYLASE: 23 U/L (ref 20–100)
ANION GAP SERPL CALCULATED.3IONS-SCNC: 14 MMOL/L (ref 7–16)
APTT: 31.6 SEC (ref 24.5–35.1)
AST SERPL-CCNC: 9 U/L (ref 0–31)
BASOPHILS ABSOLUTE: 0.03 E9/L (ref 0–0.2)
BASOPHILS RELATIVE PERCENT: 0.3 % (ref 0–2)
BETA-HYDROXYBUTYRATE: 0.06 MMOL/L (ref 0.02–0.27)
BILIRUB SERPL-MCNC: 0.3 MG/DL (ref 0–1.2)
BILIRUBIN URINE: NEGATIVE
BLOOD, URINE: NEGATIVE
BUN BLDV-MCNC: 19 MG/DL (ref 8–23)
CALCIUM SERPL-MCNC: 9.5 MG/DL (ref 8.6–10.2)
CHLORIDE BLD-SCNC: 95 MMOL/L (ref 98–107)
CLARITY: CLEAR
CO2: 27 MMOL/L (ref 22–29)
COLOR: YELLOW
CREAT SERPL-MCNC: 0.8 MG/DL (ref 0.5–1)
EKG ATRIAL RATE: 114 BPM
EKG P AXIS: 65 DEGREES
EKG P-R INTERVAL: 148 MS
EKG Q-T INTERVAL: 356 MS
EKG QRS DURATION: 108 MS
EKG QTC CALCULATION (BAZETT): 490 MS
EKG R AXIS: 53 DEGREES
EKG T AXIS: 51 DEGREES
EKG VENTRICULAR RATE: 114 BPM
EOSINOPHILS ABSOLUTE: 0 E9/L (ref 0.05–0.5)
EOSINOPHILS RELATIVE PERCENT: 0 % (ref 0–6)
GFR AFRICAN AMERICAN: >60
GFR NON-AFRICAN AMERICAN: >60 ML/MIN/1.73
GLUCOSE BLD-MCNC: 299 MG/DL (ref 74–109)
GLUCOSE URINE: 500 MG/DL
HCT VFR BLD CALC: 29 % (ref 34–48)
HEMOGLOBIN: 8.8 G/DL (ref 11.5–15.5)
IMMATURE GRANULOCYTES #: 0.06 E9/L
IMMATURE GRANULOCYTES %: 0.6 % (ref 0–5)
INFLUENZA A BY PCR: NOT DETECTED
INFLUENZA B BY PCR: NOT DETECTED
INR BLD: 1.4
KETONES, URINE: NEGATIVE MG/DL
LACTIC ACID, SEPSIS: 2.3 MMOL/L (ref 0.5–1.9)
LEUKOCYTE ESTERASE, URINE: NEGATIVE
LIPASE: 28 U/L (ref 13–60)
LYMPHOCYTES ABSOLUTE: 1.27 E9/L (ref 1.5–4)
LYMPHOCYTES RELATIVE PERCENT: 12 % (ref 20–42)
MCH RBC QN AUTO: 24.4 PG (ref 26–35)
MCHC RBC AUTO-ENTMCNC: 30.3 % (ref 32–34.5)
MCV RBC AUTO: 80.3 FL (ref 80–99.9)
MONOCYTES ABSOLUTE: 0.7 E9/L (ref 0.1–0.95)
MONOCYTES RELATIVE PERCENT: 6.6 % (ref 2–12)
NEUTROPHILS ABSOLUTE: 8.56 E9/L (ref 1.8–7.3)
NEUTROPHILS RELATIVE PERCENT: 80.5 % (ref 43–80)
NITRITE, URINE: NEGATIVE
PDW BLD-RTO: 15.9 FL (ref 11.5–15)
PH UA: 5.5 (ref 5–9)
PH VENOUS: 7.45 (ref 7.3–7.42)
PLATELET # BLD: 282 E9/L (ref 130–450)
PMV BLD AUTO: 9.6 FL (ref 7–12)
POTASSIUM REFLEX MAGNESIUM: 4.1 MMOL/L (ref 3.5–5)
PROTEIN UA: NEGATIVE MG/DL
PROTHROMBIN TIME: 16.1 SEC (ref 9.3–12.4)
RBC # BLD: 3.61 E12/L (ref 3.5–5.5)
SODIUM BLD-SCNC: 136 MMOL/L (ref 132–146)
SPECIFIC GRAVITY UA: <=1.005 (ref 1–1.03)
TOTAL PROTEIN: 6.3 G/DL (ref 6.4–8.3)
UROBILINOGEN, URINE: 0.2 E.U./DL
WBC # BLD: 10.6 E9/L (ref 4.5–11.5)

## 2018-10-13 PROCEDURE — 87502 INFLUENZA DNA AMP PROBE: CPT

## 2018-10-13 PROCEDURE — 85025 COMPLETE CBC W/AUTO DIFF WBC: CPT

## 2018-10-13 PROCEDURE — 71045 X-RAY EXAM CHEST 1 VIEW: CPT

## 2018-10-13 PROCEDURE — 96361 HYDRATE IV INFUSION ADD-ON: CPT

## 2018-10-13 PROCEDURE — 83690 ASSAY OF LIPASE: CPT

## 2018-10-13 PROCEDURE — 87088 URINE BACTERIA CULTURE: CPT

## 2018-10-13 PROCEDURE — 2580000003 HC RX 258: Performed by: EMERGENCY MEDICINE

## 2018-10-13 PROCEDURE — 82800 BLOOD PH: CPT

## 2018-10-13 PROCEDURE — 83605 ASSAY OF LACTIC ACID: CPT

## 2018-10-13 PROCEDURE — 96360 HYDRATION IV INFUSION INIT: CPT

## 2018-10-13 PROCEDURE — 85730 THROMBOPLASTIN TIME PARTIAL: CPT

## 2018-10-13 PROCEDURE — 80053 COMPREHEN METABOLIC PANEL: CPT

## 2018-10-13 PROCEDURE — 82150 ASSAY OF AMYLASE: CPT

## 2018-10-13 PROCEDURE — 87040 BLOOD CULTURE FOR BACTERIA: CPT

## 2018-10-13 PROCEDURE — 99284 EMERGENCY DEPT VISIT MOD MDM: CPT

## 2018-10-13 PROCEDURE — 82010 KETONE BODYS QUAN: CPT

## 2018-10-13 PROCEDURE — 85610 PROTHROMBIN TIME: CPT

## 2018-10-13 PROCEDURE — 81003 URINALYSIS AUTO W/O SCOPE: CPT

## 2018-10-13 RX ORDER — 0.9 % SODIUM CHLORIDE 0.9 %
1000 INTRAVENOUS SOLUTION INTRAVENOUS ONCE
Status: COMPLETED | OUTPATIENT
Start: 2018-10-13 | End: 2018-10-13

## 2018-10-13 RX ADMIN — SODIUM CHLORIDE 1000 ML: 9 INJECTION, SOLUTION INTRAVENOUS at 02:17

## 2018-10-13 NOTE — ED NOTES
Discharge instructions given to pt, verbalized understanding, pt called daughter for a ride.  Escorted to [facility] via w/c     Anna Boyce RN  10/13/18 [id number]

## 2018-10-13 NOTE — ED PROVIDER NOTES
HPI:  10/13/18,   Time: 12:56 AM         Anna Mancini is a 57 y.o. female presenting to the ED for Fever chills and fatigue , beginning 12 hours ago. The complaint has been persistent, moderate in severity, and worsened by nothing. Patient states she's been complaining of fatigue and chills. She does suffer from diabetes. She said her blood sugar has been elevated. She is a nausea or vomiting. Does report increased urination. She has no hematuria. Also reports a history of lymphadenitis which is chronic. Patient did not get a flu shot this year. ROS:   Pertinent positives and negatives are stated within HPI, all other systems reviewed and are negative.  --------------------------------------------- PAST HISTORY ---------------------------------------------  Past Medical History:  has a past medical history of Anal fissure; Anemia; Arthritis; Asthma; Atrial fibrillation ([address] Blood transfusion; CAD (coronary artery disease); Disc disorder; Fall due to stumbling; GERD (gastroesophageal reflux disease); GERD (gastroesophageal reflux disease); Hyperlipidemia; Hypertension; Inappropriate sinus tachycardia; Low hemoglobin; LVH (left ventricular hypertrophy); Lymphadenitis, chronic; Occipital neuralgia; Respiratory acidosis; Sinus tachycardia; and Type II or unspecified type diabetes mellitus without mention of complication, not stated as uncontrolled. Past Surgical History:  has a past surgical history that includes Tonsillectomy (); Appendectomy ();  section ();  section ();  section (); Anus surgery (2006); Upper gastrointestinal endoscopy (2004); Colonoscopy (2004); Colonoscopy (2006); anoscopy (10/25/2006); anoscopy (2007); anoscopy (2007); Colonoscopy (3/23/2012); Anus surgery (2012); joint replacement (); and Upper gastrointestinal endoscopy (N/A, 2018). Social History:  reports that she quit smoking about 13 years ago. Her smoking use included Cigarettes. She has a 37.50 pack-year smoking history. She has never used smokeless tobacco. She reports that she does not drink alcohol or use drugs. Family History: family history includes Colon Cancer in her father and sister; Diabetes in her father, paternal aunt, paternal aunt, paternal uncle, and paternal uncle; Heart Disease in her mother; Other in her father. The patients home medications have been reviewed.     Allergies: Statins    -------------------------------------------------- RESULTS -------------------------------------------------  All laboratory and radiology results have been personally reviewed by myself   LABS:  Results for orders placed or performed during the hospital encounter of 10/13/18   Rapid influenza A/B antigens   Result Value Ref Range    Influenza A by PCR Not Detected Not Detected    Influenza B by PCR Not Detected Not Detected   CBC auto differential   Result Value Ref Range    WBC 10.6 4.5 - 11.5 E9/L    RBC 3.61 3.50 - 5.50 E12/L    Hemoglobin 8.8 (L) 11.5 - 15.5 g/dL    Hematocrit 29.0 (L) 34.0 - 48.0 %    MCV 80.3 80.0 - 99.9 fL    MCH 24.4 (L) 26.0 - 35.0 pg    MCHC 30.3 (L) 32.0 - 34.5 %    RDW 15.9 (H) 11.5 - 15.0 fL    Platelets 831 192 - 963 E9/L    MPV 9.6 7.0 - 12.0 fL    Neutrophils % 80.5 (H) 43.0 - 80.0 %    Immature Granulocytes % 0.6 0.0 - 5.0 %    Lymphocytes % 12.0 (L) 20.0 - 42.0 %    Monocytes % 6.6 2.0 - 12.0 %    Eosinophils % 0.0 0.0 - 6.0 %    Basophils % 0.3 0.0 - 2.0 %    Neutrophils # 8.56 (H) 1.80 - 7.30 E9/L    Immature Granulocytes # 0.06 E9/L    Lymphocytes # 1.27 (L) 1.50 - 4.00 E9/L    Monocytes # 0.70 0.10 - 0.95 E9/L    Eosinophils # 0.00 (L) 0.05 - 0.50 E9/L    Basophils # 0.03 0.00 - 0.20 E9/L   Urinalysis   Result Value Ref Range    Color, UA Yellow Straw/Yellow    Clarity, UA Clear Clear    Glucose, Ur 500 (A) Negative mg/dL    Bilirubin Urine Negative Negative    Ketones, Urine Negative Negative mg/dL have been reviewed. BP (!) 143/51   Pulse 102   Temp 98.7 °F (37.1 °C) (Oral)   Resp 20   Ht 5' 3\" (1.6 m)   Wt 275 lb (124.7 kg)   LMP  (LMP Unknown)   SpO2 97%   BMI 48.71 kg/m²   Oxygen Saturation Interpretation: Normal      ---------------------------------------------------PHYSICAL EXAM--------------------------------------      Constitutional/General: Alert and oriented x3, well appearing, non toxic in NAD  Head: NC/AT  Eyes: PERRL, EOMI  Mouth: Oropharynx clear, handling secretions, no trismus  Neck: Supple, full ROM, no meningeal signs  Pulmonary: Lungs clear to auscultation bilaterally, no wheezes, rales, or rhonchi. Not in respiratory distress  Cardiovascular:  Regular rate and rhythm, no murmurs, gallops, or rubs. 2+ distal pulses  Abdomen: Soft, non tender, non distended,   Extremities: Moves all extremities x 4. Warm and well perfused, lymph edema bilaterally no ulcerations. Skin: warm and dry without rash  Neurologic: GCS 15,  Psych: Normal Affect      ------------------------------ ED COURSE/MEDICAL DECISION MAKING----------------------  Medications   0.9 % sodium chloride bolus (0 mLs Intravenous Stopped 10/13/18 0419)         Medical Decision Making:    Patient presents with fever and fatigue. Labs are not worrisome. Patient states she feels much better after IV fluids. patient's vital signs have improved. Re-examination:  10/13/18     Time: 0534  Patients symptoms are improving VS improved with IV fluids. Repeat physical examination has significantly improved. Counseling: The emergency provider has spoken with the patient and discussed todays results, in addition to providing specific details for the plan of care and counseling regarding the diagnosis and prognosis. Questions are answered at this time and they are agreeable with the plan.      --------------------------------- IMPRESSION AND DISPOSITION ---------------------------------    IMPRESSION  1.  Fever, unspecified fever cause    2. Dehydration    3.  Other fatigue        DISPOSITION  Disposition: Discharge to home  Patient condition is stable                Tello Fernandes DO  10/13/18 [id number]

## 2018-10-15 ENCOUNTER — HOSPITAL ENCOUNTER (OUTPATIENT)
Dept: WOUND CARE | Age: 63
Discharge: HOME | End: 2018-10-15
Payer: COMMERCIAL

## 2018-10-15 VITALS
RESPIRATION RATE: 18 BRPM | HEART RATE: 88 BPM | SYSTOLIC BLOOD PRESSURE: 124 MMHG | TEMPERATURE: 98.4 F | HEIGHT: 63 IN | DIASTOLIC BLOOD PRESSURE: 68 MMHG | WEIGHT: 275 LBS | BODY MASS INDEX: 48.73 KG/M2

## 2018-10-15 LAB
ANAEROBIC CULTURE: ABNORMAL
ORGANISM: ABNORMAL
URINE CULTURE, ROUTINE: NORMAL
WOUND/ABSCESS: ABNORMAL

## 2018-10-15 PROCEDURE — 29581 APPL MULTLAYER CMPRN SYS LEG: CPT

## 2018-10-15 RX ORDER — FEXOFENADINE HCL 180 MG/1
TABLET ORAL
Qty: 30 TABLET | Refills: 2 | Status: SHIPPED | OUTPATIENT
Start: 2018-10-15 | End: 2019-06-18

## 2018-10-15 NOTE — PROGRESS NOTES
Wound Cleansed Wound cleanser 8/2/2018  4:27 PM   Wound Length (cm) 0.5 cm 8/2/2018  4:50 PM   Wound Width (cm) 1.6 cm 8/2/2018  4:50 PM   Wound Depth (cm)  0 8/2/2018  4:50 PM   Calculated Wound Size (cm^2) (l*w) 0.8 cm^2 8/2/2018  4:50 PM   Change in Wound Size % (l*w) 73.33 8/2/2018  4:50 PM   Wound Assessment Pink 8/2/2018  4:27 PM   Drainage Amount Large 8/2/2018  4:27 PM   Drainage Description Brown;Green;Tan;Yellow 8/2/2018  4:27 PM   Odor None 7/26/2018  3:28 PM   Ely-wound Assessment Maceration;Pink 8/2/2018  4:27 PM   Time out Yes 7/26/2018  3:58 PM   Number of days: 87       Wound 08/02/18 Venous ulcer Leg Right; Lower; Posterior wound # 5 right post calf, acquired 7-30-18  (#4 in iheal) (Active)   Wound Image   10/11/2018  3:26 PM   Wound Type Wound 8/9/2018  4:10 PM   Wound Venous 8/9/2018  4:10 PM   Dressing Status Clean;Dry; Intact 10/11/2018  4:17 PM   Dressing Changed Changed/New 10/11/2018  4:17 PM   Dressing/Treatment Silver dressing;Alginate;Dry dressing 10/11/2018  4:17 PM   Wound Cleansed Rinsed/Irrigated with saline; Wound cleanser 10/11/2018  4:17 PM   Wound Length (cm) 5.4 cm 10/15/2018 12:50 PM   Wound Width (cm) 7.2 cm 10/15/2018 12:50 PM   Wound Depth (cm)  0.1 10/15/2018 12:50 PM   Calculated Wound Size (cm^2) (l*w) 38.88 cm^2 10/15/2018 12:50 PM   Change in Wound Size % (l*w) -120.91 10/15/2018 12:50 PM   Wound Assessment Pink; White;Yellow 10/15/2018 12:50 PM   Drainage Amount Large 10/15/2018 12:50 PM   Drainage Description Mai;Yellow 10/15/2018 12:50 PM   Odor None 10/15/2018 12:50 PM   Ely-wound Assessment Pink 10/15/2018 12:50 PM   Time out Yes 10/11/2018  3:50 PM   Procedural Pain 0 10/11/2018  3:50 PM   Post procedural Pain 0 10/11/2018  3:50 PM   Number of days: 73       Wound 09/27/18 Venous ulcer Leg Left #6 acq: 6-19-18  (converted 2 + 3 = 6)  (#5 in [facility]) (Active)   Wound Image   10/11/2018  3:26 PM   Wound Diabetic Reeder 1 9/27/2018  4:34 PM   Dressing Status

## 2018-10-17 DIAGNOSIS — Z76.0 ENCOUNTER FOR MEDICATION REFILL: ICD-10-CM

## 2018-10-17 RX ORDER — ALBUTEROL SULFATE 90 UG/1
AEROSOL, METERED RESPIRATORY (INHALATION)
Qty: 18 INHALER | Refills: 5 | Status: SHIPPED | OUTPATIENT
Start: 2018-10-17 | End: 2019-09-30 | Stop reason: RX

## 2018-10-18 ENCOUNTER — OFFICE VISIT (OUTPATIENT)
Dept: FAMILY MEDICINE CLINIC | Age: 63
End: 2018-10-18
Payer: COMMERCIAL

## 2018-10-18 ENCOUNTER — HOSPITAL ENCOUNTER (OUTPATIENT)
Dept: WOUND CARE | Age: 63
Discharge: HOME | End: 2018-10-18
Payer: COMMERCIAL

## 2018-10-18 VITALS
OXYGEN SATURATION: 93 % | RESPIRATION RATE: 20 BRPM | HEART RATE: 116 BPM | DIASTOLIC BLOOD PRESSURE: 74 MMHG | WEIGHT: 289 LBS | SYSTOLIC BLOOD PRESSURE: 132 MMHG | TEMPERATURE: 98.9 F | BODY MASS INDEX: 51.21 KG/M2 | HEIGHT: 63 IN

## 2018-10-18 VITALS
DIASTOLIC BLOOD PRESSURE: 76 MMHG | TEMPERATURE: 97.9 F | SYSTOLIC BLOOD PRESSURE: 128 MMHG | RESPIRATION RATE: 20 BRPM | HEART RATE: 100 BPM

## 2018-10-18 DIAGNOSIS — K13.70 MOUTH LESION: ICD-10-CM

## 2018-10-18 DIAGNOSIS — I82.5Z1 CHRONIC DEEP VEIN THROMBOSIS (DVT) OF DISTAL VEIN OF RIGHT LOWER EXTREMITY (HCC): ICD-10-CM

## 2018-10-18 DIAGNOSIS — B36.9 DERMATITIS FUNGAL: Primary | ICD-10-CM

## 2018-10-18 LAB
BLOOD CULTURE, ROUTINE: NORMAL
CULTURE, BLOOD 2: NORMAL

## 2018-10-18 PROCEDURE — G8598 ASA/ANTIPLAT THER USED: HCPCS | Performed by: FAMILY MEDICINE

## 2018-10-18 PROCEDURE — 11042 DBRDMT SUBQ TIS 1ST 20SQCM/<: CPT

## 2018-10-18 PROCEDURE — 1036F TOBACCO NON-USER: CPT | Performed by: FAMILY MEDICINE

## 2018-10-18 PROCEDURE — G8427 DOCREV CUR MEDS BY ELIG CLIN: HCPCS | Performed by: FAMILY MEDICINE

## 2018-10-18 PROCEDURE — 3017F COLORECTAL CA SCREEN DOC REV: CPT | Performed by: FAMILY MEDICINE

## 2018-10-18 PROCEDURE — 99214 OFFICE O/P EST MOD 30 MIN: CPT | Performed by: FAMILY MEDICINE

## 2018-10-18 PROCEDURE — G8484 FLU IMMUNIZE NO ADMIN: HCPCS | Performed by: FAMILY MEDICINE

## 2018-10-18 PROCEDURE — G8417 CALC BMI ABV UP PARAM F/U: HCPCS | Performed by: FAMILY MEDICINE

## 2018-10-18 PROCEDURE — 11045 DBRDMT SUBQ TISS EACH ADDL: CPT

## 2018-10-18 RX ORDER — SULFAMETHOXAZOLE AND TRIMETHOPRIM 800; 160 MG/1; MG/1
1 TABLET ORAL 2 TIMES DAILY
Qty: 20 TABLET | Refills: 0 | Status: SHIPPED | OUTPATIENT
Start: 2018-10-18 | End: 2018-10-28

## 2018-10-18 RX ORDER — CLOTRIMAZOLE AND BETAMETHASONE DIPROPIONATE 10; .64 MG/G; MG/G
CREAM TOPICAL
Qty: 1 TUBE | Refills: 0 | Status: SHIPPED | OUTPATIENT
Start: 2018-10-18 | End: 2018-11-02

## 2018-10-18 RX ORDER — SULFAMETHOXAZOLE AND TRIMETHOPRIM 800; 160 MG/1; MG/1
1 TABLET ORAL 2 TIMES DAILY
Status: ON HOLD | COMMUNITY
End: 2018-11-13 | Stop reason: HOSPADM

## 2018-10-18 NOTE — PROGRESS NOTES
Lateral sphincterotomy for chronic anal fissure, Dr. Donnie Rodriguez, [facility]  4/18/2012    anal exam and injection of Botox 100 units into anal sphincter, Laila Ziegler, [facility]    APPENDECTOMY  1965   [address]    COLONOSCOPY  1/20/2004    cecal polyp, bx (no pathologic changes), Dr. Valerie Edwards, [address] [place] [address] COLONOSCOPY  8/11/2006    snare polypectomy terminal ileum polyp (granulation tissue polyp) and anal polyp (inflammatory/papilla), Dr. Luis Funez, [address] [place] [address] COLONOSCOPY  3/23/2012    bx/cauterization proximal ascending colon polyp, injection of anal sphincter with Botox, Dr. Luis Funez, [facility]  2003    [address]    UPPER GASTROINTESTINAL ENDOSCOPY  1/20/2004    gastritis, bx (no H pylori), Dr. Valerie Edwards, [facility]    UPPER GASTROINTESTINAL ENDOSCOPY N/A 6/1/2018    EGD BIOPSY performed by Mike Dan MD at  [address] Marital status: Single     Spouse name: N/A    Number of children: 3    Years of education: 9     Occupational History    SSD      Social History Main Topics    Smoking status: Former Smoker     Packs/day: 1.50     Years: 25.00     Types: Cigarettes     Quit date: 12/18/2004    Smokeless tobacco: Never Used    Alcohol use No      Comment: denies caffeine    Drug use: No    Sexual activity: Not Currently     Other Topics Concern    None     Social History Narrative    None       Family History   Problem Relation Age of Onset    Heart Disease Mother     Diabetes Father     Colon Cancer Father     Other Father         BLINDNESS    Colon Cancer Sister     Diabetes Paternal Aunt     Diabetes Paternal Uncle     Diabetes Paternal Aunt     Diabetes Paternal Uncle           Current Outpatient Prescriptions   Medication Sig Dispense Refill    clotrimazole-betamethasone (LOTRISONE) 1-0.05 % cream Apply topically 2 times

## 2018-10-19 ASSESSMENT — ENCOUNTER SYMPTOMS
NAUSEA: 0
COLOR CHANGE: 0
DIARRHEA: 0
ABDOMINAL PAIN: 0
BLOOD IN STOOL: 0
COUGH: 0
BACK PAIN: 1
CONSTIPATION: 0
SINUS PRESSURE: 0
VOMITING: 0
SORE THROAT: 0
SHORTNESS OF BREATH: 0
SINUS PAIN: 0
EYE PAIN: 0

## 2018-10-22 ENCOUNTER — HOSPITAL ENCOUNTER (OUTPATIENT)
Dept: WOUND CARE | Age: 63
Discharge: HOME | End: 2018-10-22
Payer: COMMERCIAL

## 2018-10-22 VITALS
HEIGHT: 63 IN | BODY MASS INDEX: 51.21 KG/M2 | SYSTOLIC BLOOD PRESSURE: 124 MMHG | RESPIRATION RATE: 18 BRPM | TEMPERATURE: 97.6 F | DIASTOLIC BLOOD PRESSURE: 60 MMHG | HEART RATE: 96 BPM | WEIGHT: 289 LBS

## 2018-10-22 PROCEDURE — 29581 APPL MULTLAYER CMPRN SYS LEG: CPT

## 2018-10-25 ENCOUNTER — HOSPITAL ENCOUNTER (OUTPATIENT)
Dept: WOUND CARE | Age: 63
Discharge: HOME | End: 2018-10-25
Payer: COMMERCIAL

## 2018-10-25 VITALS
WEIGHT: 289 LBS | HEIGHT: 63 IN | DIASTOLIC BLOOD PRESSURE: 68 MMHG | BODY MASS INDEX: 51.21 KG/M2 | SYSTOLIC BLOOD PRESSURE: 140 MMHG | HEART RATE: 84 BPM | RESPIRATION RATE: 18 BRPM | TEMPERATURE: 98.2 F

## 2018-10-25 PROCEDURE — 11042 DBRDMT SUBQ TIS 1ST 20SQCM/<: CPT

## 2018-10-25 PROCEDURE — 11045 DBRDMT SUBQ TISS EACH ADDL: CPT

## 2018-11-01 ENCOUNTER — HOSPITAL ENCOUNTER (OUTPATIENT)
Dept: WOUND CARE | Age: 63
Discharge: HOME | End: 2018-11-01
Payer: COMMERCIAL

## 2018-11-02 ENCOUNTER — OFFICE VISIT (OUTPATIENT)
Dept: FAMILY MEDICINE CLINIC | Age: 63
End: 2018-11-02
Payer: COMMERCIAL

## 2018-11-02 VITALS
WEIGHT: 293 LBS | OXYGEN SATURATION: 90 % | SYSTOLIC BLOOD PRESSURE: 139 MMHG | DIASTOLIC BLOOD PRESSURE: 71 MMHG | TEMPERATURE: 98.2 F | HEIGHT: 63 IN | HEART RATE: 106 BPM | BODY MASS INDEX: 51.91 KG/M2 | RESPIRATION RATE: 20 BRPM

## 2018-11-02 DIAGNOSIS — I82.5Z1 CHRONIC DEEP VEIN THROMBOSIS (DVT) OF DISTAL VEIN OF RIGHT LOWER EXTREMITY (HCC): ICD-10-CM

## 2018-11-02 DIAGNOSIS — F41.9 ANXIETY: ICD-10-CM

## 2018-11-02 DIAGNOSIS — I50.32 CHRONIC DIASTOLIC CONGESTIVE HEART FAILURE (HCC): Primary | ICD-10-CM

## 2018-11-02 DIAGNOSIS — I51.7 LVH (LEFT VENTRICULAR HYPERTROPHY): ICD-10-CM

## 2018-11-02 DIAGNOSIS — I48.0 PAF (PAROXYSMAL ATRIAL FIBRILLATION) (HCC): ICD-10-CM

## 2018-11-02 DIAGNOSIS — F32.A DEPRESSION, UNSPECIFIED DEPRESSION TYPE: ICD-10-CM

## 2018-11-02 DIAGNOSIS — I89.0 LYMPHEDEMA OF BOTH LOWER EXTREMITIES: ICD-10-CM

## 2018-11-02 PROCEDURE — G8598 ASA/ANTIPLAT THER USED: HCPCS | Performed by: FAMILY MEDICINE

## 2018-11-02 PROCEDURE — 3017F COLORECTAL CA SCREEN DOC REV: CPT | Performed by: FAMILY MEDICINE

## 2018-11-02 PROCEDURE — G8427 DOCREV CUR MEDS BY ELIG CLIN: HCPCS | Performed by: FAMILY MEDICINE

## 2018-11-02 PROCEDURE — G8417 CALC BMI ABV UP PARAM F/U: HCPCS | Performed by: FAMILY MEDICINE

## 2018-11-02 PROCEDURE — G8484 FLU IMMUNIZE NO ADMIN: HCPCS | Performed by: FAMILY MEDICINE

## 2018-11-02 PROCEDURE — 1036F TOBACCO NON-USER: CPT | Performed by: FAMILY MEDICINE

## 2018-11-02 PROCEDURE — 99214 OFFICE O/P EST MOD 30 MIN: CPT | Performed by: FAMILY MEDICINE

## 2018-11-02 RX ORDER — ALPRAZOLAM 0.25 MG/1
0.25 TABLET ORAL NIGHTLY PRN
Qty: 30 TABLET | Refills: 0 | Status: ON HOLD | OUTPATIENT
Start: 2018-11-02 | End: 2018-12-05 | Stop reason: HOSPADM

## 2018-11-02 NOTE — PROGRESS NOTES
Vignesh Bansal  : 1955    Chief Complaint:     Chief Complaint   Patient presents with    Wound Check       HPI  Vignesh Bansal 57 y.o. presents for   Chief Complaint   Patient presents with   Marlys Maxwell Wound Check     Fatigue/CHF/PAF/DVT/depression/lymphedema/anxiety   She has noticed weight gain recently. She also is more dyspneic than before. She always is dyspneic when she walks due to her comorbidities. She does go to lymphedema therapy 3x a week. Her legs are currently wrapped. She is very anxious as well. She is very nervous about her health. She wishes for something for anxiety. She was supposed to go to the chf clinic however she only went a few times. She is willing to go back there. She has not seen her cardiologist recently. All questions were answered to patients satisfaction. Past Medical History:   Diagnosis Date    Anal fissure     Anemia 10/6/2017    Arthritis     Asthma     Atrial fibrillation ([address]     Blood transfusion     long time no reaction    CAD (coronary artery disease)     Disc disorder     Fall due to stumbling 10/6/2017    GERD (gastroesophageal reflux disease)     GERD (gastroesophageal reflux disease)     Hyperlipidemia     Hypertension     Inappropriate sinus tachycardia     Low hemoglobin 2018    LVH (left ventricular hypertrophy)     moderate    Lymphadenitis, chronic 2018    Occipital neuralgia 2011    Respiratory acidosis 10/7/2017    Sinus tachycardia 2015    Type II or unspecified type diabetes mellitus without mention of complication, not stated as uncontrolled        Past Surgical History:   Procedure Laterality Date    ANOSCOPY  10/25/2006    injection of anal sphincter with Botox, Franklyn Ortiz/Timmy, [facility]    ANOSCOPY  2007    injection of anal sphincter with Botox, Dr. Andreas Moody, [address] [place] [address] ANOSCOPY  2007    injection of anal sphincter with Botox, Franklyn Ojeda, [facility]    ANUS SURGERY  2006    Lateral sphincterotomy for chronic anal fissure, Dr. Saw Valentino, [facility]  4/18/2012    anal exam and injection of Botox 100 units into anal sphincter, Laila Ureña, [facility]    APPENDECTOMY  1965   [address]    COLONOSCOPY  1/20/2004    cecal polyp, bx (no pathologic changes), Dr. Max Isaac, [facility] COLONOSCOPY  8/11/2006    snare polypectomy terminal ileum polyp (granulation tissue polyp) and anal polyp (inflammatory/papilla), Dr. Lobito Zamora, [facility] COLONOSCOPY  3/23/2012    bx/cauterization proximal ascending colon polyp, injection of anal sphincter with Botox, Dr. Lobito Zamora, [facility]  2003    [address]    UPPER GASTROINTESTINAL ENDOSCOPY  1/20/2004    gastritis, bx (no H pylori), Dr. Max Isaac, [facility]    UPPER GASTROINTESTINAL ENDOSCOPY N/A 6/1/2018    EGD BIOPSY performed by Murray Qureshi MD at  [address] Marital status: Single     Spouse name: N/A    Number of children: 3    Years of education: 9     Occupational History    SSD      Social History Main Topics    Smoking status: Former Smoker     Packs/day: 1.50     Years: 25.00     Types: Cigarettes     Quit date: 12/18/2004    Smokeless tobacco: Never Used    Alcohol use No      Comment: denies caffeine    Drug use: No    Sexual activity: Not Currently     Other Topics Concern    None     Social History Narrative    None       Family History   Problem Relation Age of Onset    Heart Disease Mother     Diabetes Father     Colon Cancer Father     Other Father         BLINDNESS    Colon Cancer Sister     Diabetes Paternal Aunt     Diabetes Paternal Uncle     Diabetes Paternal Aunt     Diabetes Paternal Uncle           Current Outpatient Prescriptions   Medication Sig Dispense Refill    ALPRAZolam (XANAX) 0.25 MG tablet Take 1 tablet by mouth nightly as needed for Anxiety for Gastrointestinal: Negative for abdominal pain, blood in stool, constipation, diarrhea, nausea and vomiting. Endocrine: Negative for cold intolerance and heat intolerance. Genitourinary: Positive for difficulty urinating and frequency. Negative for dysuria and menstrual problem. Musculoskeletal: Positive for arthralgias, back pain, myalgias and neck pain. Skin: Negative for color change and wound. Allergic/Immunologic: Positive for environmental allergies. Neurological: Positive for numbness and headaches. Negative for dizziness, weakness and light-headedness. Hematological: Negative for adenopathy. Psychiatric/Behavioral: Positive for sleep disturbance. Negative for behavioral problems and dysphoric mood. The patient is not nervous/anxious. PHYSICAL EXAM  /71   Pulse 106   Temp 98.2 °F (36.8 °C) (Tympanic)   Resp 20   Ht 5' 3\" (1.6 m)   Wt (!) 308 lb (139.7 kg)   LMP  (LMP Unknown)   SpO2 90%   BMI 54.56 kg/m²   Physical Exam   Constitutional: She is oriented to person, place, and time. She appears well-developed and well-nourished. No distress. Uses walker   HENT:   Head: Normocephalic and atraumatic. Right Ear: External ear normal.   Left Ear: External ear normal.   Nose: Nose normal.   Mouth/Throat: Oropharynx is clear and moist.   Eyes: Conjunctivae and EOM are normal. No scleral icterus. Neck: Neck supple. No thyromegaly present. Cardiovascular: Normal rate, regular rhythm and normal heart sounds. No murmur heard. Pulmonary/Chest: Effort normal. No respiratory distress. She has no wheezes. Decreased breath sounds throughout   Abdominal: Soft. There is no tenderness. Genitourinary: Vagina normal.   Musculoskeletal: Normal range of motion. She exhibits edema (legs are wrapped currently still edematous). Edema noted bilaterally in the thigh   Lymphadenopathy:     She has no cervical adenopathy.    Neurological: She is alert and oriented to person, place, and time.   Skin: Skin is warm and dry. Legs wrapped currently no wounds noted   Psychiatric: She has a normal mood and affect. Her behavior is normal. Judgment and thought content normal.   Nursing note and vitals reviewed. Laboratory: All laboratory and radiology results have been personally reviewed by myself    Lab Results   Component Value Date     10/13/2018    K 4.1 10/13/2018    CL 95 10/13/2018    CO2 27 10/13/2018    BUN 19 10/13/2018    CREATININE 0.8 10/13/2018    PROT 6.3 10/13/2018    LABALBU 3.5 10/13/2018    LABALBU 4.5 11/02/2011    CALCIUM 9.5 10/13/2018    GFRAA >60 10/13/2018    LABGLOM >60 10/13/2018    GLUCOSE 299 10/13/2018    GLUCOSE 181 12/16/2011    AST 9 10/13/2018    ALT 8 10/13/2018    ALKPHOS 66 10/13/2018    BILITOT 0.3 10/13/2018    TSH 1.760 05/12/2017    VITD25 21 05/12/2017    CHOL 177 05/12/2017    TRIG 285 05/12/2017    HDL 34 05/16/2018    LDLCALC 98 05/16/2018    LABA1C 7.2 05/16/2018        Lab Results   Component Value Date    CHOL 177 05/12/2017    CHOL 208 (H) 08/15/2016    CHOL 239 (H) 05/12/2016     Lab Results   Component Value Date    TRIG 285 (H) 05/12/2017    TRIG 336 (H) 08/15/2016    TRIG 599 (H) 05/12/2016     Lab Results   Component Value Date    HDL 34 05/16/2018    HDL 37 05/12/2017    HDL 40 08/15/2016     Lab Results   Component Value Date    LDLCALC 98 05/16/2018    LDLCALC 83 05/12/2017    LDLCALC 101 (H) 08/15/2016       Lab Results   Component Value Date    LABA1C 7.2 05/16/2018    LABA1C 7.2 12/14/2017    LABA1C 8.7 09/18/2017     Lab Results   Component Value Date    LABMICR <12.0 05/12/2017    LDLCALC 98 05/16/2018    CREATININE 0.8 10/13/2018       ASSESSMENT/PLAN:     Diagnosis Orders   1. Chronic diastolic congestive heart failure [facility])  [facility]   2. PAF (paroxysmal atrial fibrillation) [facility])  [facility]   3.  Chronic deep vein thrombosis (DVT) of distal vein of right

## 2018-11-03 ASSESSMENT — ENCOUNTER SYMPTOMS
BACK PAIN: 1
SORE THROAT: 0
BLOOD IN STOOL: 0
CONSTIPATION: 0
SINUS PRESSURE: 0
COLOR CHANGE: 0
SINUS PAIN: 0
SHORTNESS OF BREATH: 0
NAUSEA: 0
ABDOMINAL PAIN: 0
VOMITING: 0
COUGH: 0
EYE PAIN: 0
DIARRHEA: 0

## 2018-11-05 ENCOUNTER — TELEPHONE (OUTPATIENT)
Dept: FAMILY MEDICINE CLINIC | Age: 63
End: 2018-11-05

## 2018-11-05 DIAGNOSIS — R60.9 EDEMA, UNSPECIFIED TYPE: Primary | ICD-10-CM

## 2018-11-05 NOTE — TELEPHONE ENCOUNTER
Continue with this dosing for two more days and call with update on Wednesday. Also will get lab at that time to further evaluate.

## 2018-11-06 ENCOUNTER — HOSPITAL ENCOUNTER (INPATIENT)
Age: 63
LOS: 7 days | Discharge: HOME | DRG: 253 | End: 2018-11-13
Attending: EMERGENCY MEDICINE | Admitting: INTERNAL MEDICINE
Payer: COMMERCIAL

## 2018-11-06 ENCOUNTER — APPOINTMENT (OUTPATIENT)
Dept: GENERAL RADIOLOGY | Age: 63
DRG: 253 | End: 2018-11-06
Payer: COMMERCIAL

## 2018-11-06 DIAGNOSIS — K92.1 MELENA: ICD-10-CM

## 2018-11-06 DIAGNOSIS — I50.9 CHRONIC CONGESTIVE HEART FAILURE, UNSPECIFIED HEART FAILURE TYPE (HCC): ICD-10-CM

## 2018-11-06 DIAGNOSIS — D50.0 BLOOD LOSS ANEMIA: Primary | ICD-10-CM

## 2018-11-06 PROBLEM — J44.9 COPD (CHRONIC OBSTRUCTIVE PULMONARY DISEASE) (HCC): Chronic | Status: ACTIVE | Noted: 2018-11-06

## 2018-11-06 PROBLEM — R00.2 PALPITATIONS: Status: ACTIVE | Noted: 2018-11-06

## 2018-11-06 PROBLEM — I48.0 PAF (PAROXYSMAL ATRIAL FIBRILLATION) (HCC): Status: ACTIVE | Noted: 2018-11-06

## 2018-11-06 PROBLEM — F41.9 ANXIETY AND DEPRESSION: Chronic | Status: ACTIVE | Noted: 2018-11-06

## 2018-11-06 PROBLEM — D64.9 CHRONIC ANEMIA: Chronic | Status: ACTIVE | Noted: 2018-04-13

## 2018-11-06 PROBLEM — Z79.01 CHRONIC ANTICOAGULATION: Chronic | Status: ACTIVE | Noted: 2018-11-06

## 2018-11-06 PROBLEM — J96.11 CHRONIC RESPIRATORY FAILURE WITH HYPOXIA (HCC): Chronic | Status: ACTIVE | Noted: 2018-11-06

## 2018-11-06 PROBLEM — I48.0 PAF (PAROXYSMAL ATRIAL FIBRILLATION) (HCC): Chronic | Status: ACTIVE | Noted: 2018-11-06

## 2018-11-06 PROBLEM — F32.A ANXIETY AND DEPRESSION: Chronic | Status: ACTIVE | Noted: 2018-11-06

## 2018-11-06 PROBLEM — I50.32 CHRONIC DIASTOLIC HEART FAILURE (HCC): Chronic | Status: ACTIVE | Noted: 2018-01-15

## 2018-11-06 PROBLEM — K92.2 GI BLEED: Status: ACTIVE | Noted: 2018-11-06

## 2018-11-06 PROBLEM — R19.5 OCCULT BLOOD IN STOOLS: Status: ACTIVE | Noted: 2018-11-06

## 2018-11-06 LAB
ALBUMIN SERPL-MCNC: 3.6 G/DL (ref 3.5–5.2)
ALP BLD-CCNC: 61 U/L (ref 35–104)
ALT SERPL-CCNC: 9 U/L (ref 0–32)
ANION GAP SERPL CALCULATED.3IONS-SCNC: 8 MMOL/L (ref 7–16)
AST SERPL-CCNC: 12 U/L (ref 0–31)
BASOPHILS ABSOLUTE: 0.03 E9/L (ref 0–0.2)
BASOPHILS RELATIVE PERCENT: 0.6 % (ref 0–2)
BILIRUB SERPL-MCNC: <0.2 MG/DL (ref 0–1.2)
BUN BLDV-MCNC: 28 MG/DL (ref 8–23)
CALCIUM SERPL-MCNC: 8.8 MG/DL (ref 8.6–10.2)
CHLORIDE BLD-SCNC: 95 MMOL/L (ref 98–107)
CO2: 35 MMOL/L (ref 22–29)
CREAT SERPL-MCNC: 1 MG/DL (ref 0.5–1)
EOSINOPHILS ABSOLUTE: 0.09 E9/L (ref 0.05–0.5)
EOSINOPHILS RELATIVE PERCENT: 1.8 % (ref 0–6)
GFR AFRICAN AMERICAN: >60
GFR NON-AFRICAN AMERICAN: 56 ML/MIN/1.73
GLUCOSE BLD-MCNC: 378 MG/DL (ref 74–99)
HCT VFR BLD CALC: 27.6 % (ref 34–48)
HEMOGLOBIN: 8 G/DL (ref 11.5–15.5)
IMMATURE GRANULOCYTES #: 0.03 E9/L
IMMATURE GRANULOCYTES %: 0.6 % (ref 0–5)
INR BLD: 1.3
LYMPHOCYTES ABSOLUTE: 0.81 E9/L (ref 1.5–4)
LYMPHOCYTES RELATIVE PERCENT: 15.8 % (ref 20–42)
MCH RBC QN AUTO: 23.9 PG (ref 26–35)
MCHC RBC AUTO-ENTMCNC: 29 % (ref 32–34.5)
MCV RBC AUTO: 82.4 FL (ref 80–99.9)
METER GLUCOSE: 215 MG/DL (ref 74–99)
METER GLUCOSE: 222 MG/DL (ref 74–99)
MONOCYTES ABSOLUTE: 0.36 E9/L (ref 0.1–0.95)
MONOCYTES RELATIVE PERCENT: 7 % (ref 2–12)
NEUTROPHILS ABSOLUTE: 3.81 E9/L (ref 1.8–7.3)
NEUTROPHILS RELATIVE PERCENT: 74.2 % (ref 43–80)
PDW BLD-RTO: 17.3 FL (ref 11.5–15)
PLATELET # BLD: 261 E9/L (ref 130–450)
PMV BLD AUTO: 9.1 FL (ref 7–12)
POTASSIUM SERPL-SCNC: 4.3 MMOL/L (ref 3.5–5)
PRO-BNP: 191 PG/ML (ref 0–125)
PROTHROMBIN TIME: 15.5 SEC (ref 9.3–12.4)
RBC # BLD: 3.35 E12/L (ref 3.5–5.5)
SODIUM BLD-SCNC: 138 MMOL/L (ref 132–146)
TOTAL PROTEIN: 6.4 G/DL (ref 6.4–8.3)
TROPONIN: <0.01 NG/ML (ref 0–0.03)
WBC # BLD: 5.1 E9/L (ref 4.5–11.5)

## 2018-11-06 PROCEDURE — 2700000000 HC OXYGEN THERAPY PER DAY

## 2018-11-06 PROCEDURE — 99222 1ST HOSP IP/OBS MODERATE 55: CPT | Performed by: INTERNAL MEDICINE

## 2018-11-06 PROCEDURE — 99285 EMERGENCY DEPT VISIT HI MDM: CPT

## 2018-11-06 PROCEDURE — 94760 N-INVAS EAR/PLS OXIMETRY 1: CPT

## 2018-11-06 PROCEDURE — 85025 COMPLETE CBC W/AUTO DIFF WBC: CPT

## 2018-11-06 PROCEDURE — 6360000002 HC RX W HCPCS: Performed by: EMERGENCY MEDICINE

## 2018-11-06 PROCEDURE — 85610 PROTHROMBIN TIME: CPT

## 2018-11-06 PROCEDURE — 83880 ASSAY OF NATRIURETIC PEPTIDE: CPT

## 2018-11-06 PROCEDURE — 94640 AIRWAY INHALATION TREATMENT: CPT

## 2018-11-06 PROCEDURE — 2580000003 HC RX 258: Performed by: INTERNAL MEDICINE

## 2018-11-06 PROCEDURE — 6370000000 HC RX 637 (ALT 250 FOR IP): Performed by: INTERNAL MEDICINE

## 2018-11-06 PROCEDURE — 2060000000 HC ICU INTERMEDIATE R&B

## 2018-11-06 PROCEDURE — 80053 COMPREHEN METABOLIC PANEL: CPT

## 2018-11-06 PROCEDURE — 82962 GLUCOSE BLOOD TEST: CPT

## 2018-11-06 PROCEDURE — 84484 ASSAY OF TROPONIN QUANT: CPT

## 2018-11-06 PROCEDURE — 94664 DEMO&/EVAL PT USE INHALER: CPT

## 2018-11-06 PROCEDURE — 96374 THER/PROPH/DIAG INJ IV PUSH: CPT

## 2018-11-06 PROCEDURE — 71045 X-RAY EXAM CHEST 1 VIEW: CPT

## 2018-11-06 PROCEDURE — C9113 INJ PANTOPRAZOLE SODIUM, VIA: HCPCS | Performed by: EMERGENCY MEDICINE

## 2018-11-06 RX ORDER — PANTOPRAZOLE SODIUM 40 MG/10ML
80 INJECTION, POWDER, LYOPHILIZED, FOR SOLUTION INTRAVENOUS ONCE
Status: COMPLETED | OUTPATIENT
Start: 2018-11-06 | End: 2018-11-06

## 2018-11-06 RX ORDER — SODIUM CHLORIDE 0.9 % (FLUSH) 0.9 %
10 SYRINGE (ML) INJECTION PRN
Status: DISCONTINUED | OUTPATIENT
Start: 2018-11-06 | End: 2018-11-13 | Stop reason: HOSPADM

## 2018-11-06 RX ORDER — PANTOPRAZOLE SODIUM 40 MG/1
40 TABLET, DELAYED RELEASE ORAL
Status: DISCONTINUED | OUTPATIENT
Start: 2018-11-07 | End: 2018-11-13 | Stop reason: HOSPADM

## 2018-11-06 RX ORDER — CETIRIZINE HYDROCHLORIDE 10 MG/1
10 TABLET ORAL DAILY
Status: DISCONTINUED | OUTPATIENT
Start: 2018-11-07 | End: 2018-11-13 | Stop reason: HOSPADM

## 2018-11-06 RX ORDER — SODIUM CHLORIDE 0.9 % (FLUSH) 0.9 %
10 SYRINGE (ML) INJECTION EVERY 12 HOURS SCHEDULED
Status: DISCONTINUED | OUTPATIENT
Start: 2018-11-06 | End: 2018-11-13 | Stop reason: HOSPADM

## 2018-11-06 RX ORDER — CYCLOBENZAPRINE HCL 10 MG
10 TABLET ORAL 3 TIMES DAILY PRN
Status: DISCONTINUED | OUTPATIENT
Start: 2018-11-06 | End: 2018-11-13 | Stop reason: HOSPADM

## 2018-11-06 RX ORDER — FLUTICASONE PROPIONATE 50 MCG
2 SPRAY, SUSPENSION (ML) NASAL DAILY
Status: DISCONTINUED | OUTPATIENT
Start: 2018-11-07 | End: 2018-11-13 | Stop reason: HOSPADM

## 2018-11-06 RX ORDER — ACETAMINOPHEN 325 MG/1
650 TABLET ORAL EVERY 4 HOURS PRN
Status: DISCONTINUED | OUTPATIENT
Start: 2018-11-06 | End: 2018-11-13 | Stop reason: HOSPADM

## 2018-11-06 RX ORDER — NYSTATIN 100000 U/G
CREAM TOPICAL 2 TIMES DAILY
Status: DISCONTINUED | OUTPATIENT
Start: 2018-11-06 | End: 2018-11-13 | Stop reason: HOSPADM

## 2018-11-06 RX ORDER — FERROUS SULFATE 325(65) MG
325 TABLET ORAL
Status: DISCONTINUED | OUTPATIENT
Start: 2018-11-07 | End: 2018-11-13 | Stop reason: HOSPADM

## 2018-11-06 RX ORDER — DOCUSATE SODIUM 100 MG/1
100 CAPSULE, LIQUID FILLED ORAL 3 TIMES DAILY
Status: DISCONTINUED | OUTPATIENT
Start: 2018-11-06 | End: 2018-11-13 | Stop reason: HOSPADM

## 2018-11-06 RX ORDER — GABAPENTIN 400 MG/1
400 CAPSULE ORAL 3 TIMES DAILY
Status: DISCONTINUED | OUTPATIENT
Start: 2018-11-06 | End: 2018-11-13 | Stop reason: HOSPADM

## 2018-11-06 RX ORDER — MONTELUKAST SODIUM 10 MG/1
10 TABLET ORAL NIGHTLY
Status: DISCONTINUED | OUTPATIENT
Start: 2018-11-06 | End: 2018-11-13 | Stop reason: HOSPADM

## 2018-11-06 RX ORDER — METOPROLOL TARTRATE 50 MG/1
100 TABLET, FILM COATED ORAL 2 TIMES DAILY
Status: DISCONTINUED | OUTPATIENT
Start: 2018-11-06 | End: 2018-11-13 | Stop reason: HOSPADM

## 2018-11-06 RX ORDER — TORSEMIDE 20 MG/1
20 TABLET ORAL ONCE
Status: COMPLETED | OUTPATIENT
Start: 2018-11-06 | End: 2018-11-06

## 2018-11-06 RX ORDER — FENOFIBRATE 160 MG/1
160 TABLET ORAL DAILY
Status: DISCONTINUED | OUTPATIENT
Start: 2018-11-07 | End: 2018-11-13 | Stop reason: HOSPADM

## 2018-11-06 RX ORDER — ESCITALOPRAM OXALATE 10 MG/1
20 TABLET ORAL DAILY
Status: DISCONTINUED | OUTPATIENT
Start: 2018-11-07 | End: 2018-11-13 | Stop reason: HOSPADM

## 2018-11-06 RX ORDER — ALBUTEROL SULFATE 90 UG/1
2 AEROSOL, METERED RESPIRATORY (INHALATION) EVERY 4 HOURS PRN
Status: DISCONTINUED | OUTPATIENT
Start: 2018-11-06 | End: 2018-11-13 | Stop reason: HOSPADM

## 2018-11-06 RX ORDER — INSULIN GLARGINE 100 [IU]/ML
100 INJECTION, SOLUTION SUBCUTANEOUS NIGHTLY
Status: DISCONTINUED | OUTPATIENT
Start: 2018-11-06 | End: 2018-11-13 | Stop reason: HOSPADM

## 2018-11-06 RX ORDER — ALPRAZOLAM 0.25 MG/1
0.25 TABLET ORAL NIGHTLY PRN
Status: DISCONTINUED | OUTPATIENT
Start: 2018-11-07 | End: 2018-11-13 | Stop reason: HOSPADM

## 2018-11-06 RX ORDER — OXYCODONE AND ACETAMINOPHEN 10; 325 MG/1; MG/1
1 TABLET ORAL EVERY 4 HOURS PRN
Status: DISCONTINUED | OUTPATIENT
Start: 2018-11-06 | End: 2018-11-13 | Stop reason: HOSPADM

## 2018-11-06 RX ORDER — ONDANSETRON 2 MG/ML
4 INJECTION INTRAMUSCULAR; INTRAVENOUS EVERY 6 HOURS PRN
Status: DISCONTINUED | OUTPATIENT
Start: 2018-11-06 | End: 2018-11-13 | Stop reason: HOSPADM

## 2018-11-06 RX ORDER — IPRATROPIUM BROMIDE AND ALBUTEROL SULFATE 2.5; .5 MG/3ML; MG/3ML
1 SOLUTION RESPIRATORY (INHALATION) EVERY 4 HOURS
Status: DISCONTINUED | OUTPATIENT
Start: 2018-11-06 | End: 2018-11-13 | Stop reason: HOSPADM

## 2018-11-06 RX ORDER — DILTIAZEM HYDROCHLORIDE 240 MG/1
240 CAPSULE, COATED, EXTENDED RELEASE ORAL DAILY
Status: DISCONTINUED | OUTPATIENT
Start: 2018-11-07 | End: 2018-11-13 | Stop reason: HOSPADM

## 2018-11-06 RX ORDER — TRAZODONE HYDROCHLORIDE 50 MG/1
50 TABLET ORAL NIGHTLY
Status: DISCONTINUED | OUTPATIENT
Start: 2018-11-06 | End: 2018-11-13 | Stop reason: HOSPADM

## 2018-11-06 RX ORDER — SPIRONOLACTONE 25 MG/1
25 TABLET ORAL DAILY
Status: DISCONTINUED | OUTPATIENT
Start: 2018-11-07 | End: 2018-11-13 | Stop reason: HOSPADM

## 2018-11-06 RX ORDER — SULFAMETHOXAZOLE AND TRIMETHOPRIM 800; 160 MG/1; MG/1
1 TABLET ORAL 2 TIMES DAILY
Status: DISCONTINUED | OUTPATIENT
Start: 2018-11-06 | End: 2018-11-13 | Stop reason: HOSPADM

## 2018-11-06 RX ORDER — TORSEMIDE 20 MG/1
20 TABLET ORAL DAILY
Status: DISCONTINUED | OUTPATIENT
Start: 2018-11-07 | End: 2018-11-13 | Stop reason: HOSPADM

## 2018-11-06 RX ADMIN — TORSEMIDE 20 MG: 20 TABLET ORAL at 23:07

## 2018-11-06 RX ADMIN — GABAPENTIN 400 MG: 400 CAPSULE ORAL at 23:02

## 2018-11-06 RX ADMIN — MOMETASONE FUROATE AND FORMOTEROL FUMARATE DIHYDRATE 2 PUFF: 200; 5 AEROSOL RESPIRATORY (INHALATION) at 23:56

## 2018-11-06 RX ADMIN — OXYCODONE AND ACETAMINOPHEN 1 TABLET: 10; 325 TABLET ORAL at 23:00

## 2018-11-06 RX ADMIN — SULFAMETHOXAZOLE AND TRIMETHOPRIM 1 TABLET: 800; 160 TABLET ORAL at 23:01

## 2018-11-06 RX ADMIN — MONTELUKAST SODIUM 10 MG: 10 TABLET, FILM COATED ORAL at 23:02

## 2018-11-06 RX ADMIN — Medication 10 ML: at 23:06

## 2018-11-06 RX ADMIN — PANTOPRAZOLE SODIUM 80 MG: 40 INJECTION, POWDER, FOR SOLUTION INTRAVENOUS at 17:49

## 2018-11-06 RX ADMIN — INSULIN GLARGINE 70 UNITS: 100 INJECTION, SOLUTION SUBCUTANEOUS at 23:12

## 2018-11-06 RX ADMIN — NYSTATIN: 100000 CREAM TOPICAL at 23:01

## 2018-11-06 RX ADMIN — TRAZODONE HYDROCHLORIDE 50 MG: 50 TABLET ORAL at 23:02

## 2018-11-06 RX ADMIN — METOPROLOL TARTRATE 100 MG: 50 TABLET ORAL at 23:02

## 2018-11-06 RX ADMIN — APIXABAN 5 MG: 5 TABLET, FILM COATED ORAL at 23:02

## 2018-11-06 RX ADMIN — IPRATROPIUM BROMIDE AND ALBUTEROL SULFATE 3 ML: .5; 3 SOLUTION RESPIRATORY (INHALATION) at 23:56

## 2018-11-06 RX ADMIN — DOCUSATE SODIUM 100 MG: 100 CAPSULE, LIQUID FILLED ORAL at 23:03

## 2018-11-06 ASSESSMENT — PAIN DESCRIPTION - ORIENTATION: ORIENTATION: RIGHT;LEFT

## 2018-11-06 ASSESSMENT — ENCOUNTER SYMPTOMS
BLOOD IN STOOL: 0
BACK PAIN: 0
VOMITING: 0
SHORTNESS OF BREATH: 1
ABDOMINAL PAIN: 0
COUGH: 0
NAUSEA: 0

## 2018-11-06 ASSESSMENT — PAIN SCALES - GENERAL
PAINLEVEL_OUTOF10: 8
PAINLEVEL_OUTOF10: 0
PAINLEVEL_OUTOF10: 2

## 2018-11-06 ASSESSMENT — PAIN DESCRIPTION - PROGRESSION: CLINICAL_PROGRESSION: NOT CHANGED

## 2018-11-06 ASSESSMENT — PAIN DESCRIPTION - FREQUENCY: FREQUENCY: INTERMITTENT

## 2018-11-06 ASSESSMENT — PAIN DESCRIPTION - DESCRIPTORS: DESCRIPTORS: ACHING;DISCOMFORT;SORE

## 2018-11-06 ASSESSMENT — PAIN DESCRIPTION - PAIN TYPE: TYPE: CHRONIC PAIN

## 2018-11-06 ASSESSMENT — PAIN DESCRIPTION - LOCATION: LOCATION: LEG

## 2018-11-06 ASSESSMENT — PAIN DESCRIPTION - ONSET: ONSET: ON-GOING

## 2018-11-06 NOTE — ED PROVIDER NOTES
enlarged. The lung fields are unremarkable. The aorta is unremarkable. Cardiomegaly     Vl Po Bilateral Limited 1-2 Levels    Addendum Date: 10/16/2018    Patient MRN: [id number] : 1955 Age:  57 years Gender: Female Order Date: 10/9/2018 8:38 AM Exam: US LOWER EXTREMITY BILATERAL VEIN MAPPING W DVT, VL PO BILATERAL LIMITED 1-2 LEVELS Number of Images: 3 views Indication:  I82.5Z1 Chronic deep vein thrombosis (DVT) of distal vein of right lower extremity (HCC) s/p dvt Comparison: None. Findings: Ankle brachial indices on the right : Dorsal pedal artery 1.21 Posterior tibial artery 1.22 Segmental pressure measurements on the right above the knee are within normal limits Segmental pressure measurements on the right at the level the ankle are within normal limits Arterial waveforms above the knee are within normal limits Arterial waveforms were at the level the ankle are within normal limits Arterial digital waveforms at the level the digital arteries are abnormal Ankle brachial indices on the left: Dorsal pedal artery 1.19 Posterior tibial artery 1.15 Segmental pressure measurements on the left above the knee are within normal limits Segmental pressure measurements on the left at the level the ankle are within normal limits Arterial waveforms above the knee are within normal limits Arterial waveforms were at the level the ankle are within normal limits Arterial digital waveforms at the level the digital arteries are abnormal IMPRESSION:  1. Findings compatible with bilateral small vessel disease involving the digital arteries worse on the right then on the left     Result Date: 10/16/2018  Patient MRN: [id number] : 1955 Age:  57 years Gender: Female Order Date: 10/9/2018 8:38 AM Exam: US LOWER EXTREMITY BILATERAL VEIN MAPPING W DVT, VL PO BILATERAL LIMITED 1-2 LEVELS Number of Images: 55 views Indication:  I82.5Z1 Chronic deep vein thrombosis, reevaluation.  Bilateral lower extremity lymphedema and

## 2018-11-06 NOTE — ED NOTES
Bed: 26  Expected date:   Expected time:   Means of arrival:   Comments:  Ems, palpitations, anxiety     James Giron, RN  11/06/18 0773

## 2018-11-07 LAB
ANION GAP SERPL CALCULATED.3IONS-SCNC: 9 MMOL/L (ref 7–16)
ANISOCYTOSIS: ABNORMAL
BASOPHILS ABSOLUTE: 0.03 E9/L (ref 0–0.2)
BASOPHILS RELATIVE PERCENT: 0.5 % (ref 0–2)
BUN BLDV-MCNC: 21 MG/DL (ref 8–23)
CALCIUM SERPL-MCNC: 9.2 MG/DL (ref 8.6–10.2)
CHLORIDE BLD-SCNC: 95 MMOL/L (ref 98–107)
CO2: 36 MMOL/L (ref 22–29)
CREAT SERPL-MCNC: 0.9 MG/DL (ref 0.5–1)
EOSINOPHILS ABSOLUTE: 0.17 E9/L (ref 0.05–0.5)
EOSINOPHILS RELATIVE PERCENT: 3 % (ref 0–6)
FERRITIN: 193 NG/ML
FOLATE: 13 NG/ML (ref 4.8–24.2)
GFR AFRICAN AMERICAN: >60
GFR NON-AFRICAN AMERICAN: >60 ML/MIN/1.73
GLUCOSE BLD-MCNC: 177 MG/DL (ref 74–99)
HCT VFR BLD CALC: 29.9 % (ref 34–48)
HEMOGLOBIN: 8.6 G/DL (ref 11.5–15.5)
HYPOCHROMIA: ABNORMAL
IMMATURE GRANULOCYTES #: 0.02 E9/L
IMMATURE GRANULOCYTES %: 0.3 % (ref 0–5)
IMMATURE RETIC FRACT: 26.3 % (ref 3–15.9)
IRON SATURATION: 18 % (ref 15–50)
IRON: 50 MCG/DL (ref 37–145)
LYMPHOCYTES ABSOLUTE: 1.55 E9/L (ref 1.5–4)
LYMPHOCYTES RELATIVE PERCENT: 27 % (ref 20–42)
MAGNESIUM: 2 MG/DL (ref 1.6–2.6)
MCH RBC QN AUTO: 23.6 PG (ref 26–35)
MCHC RBC AUTO-ENTMCNC: 28.8 % (ref 32–34.5)
MCV RBC AUTO: 81.9 FL (ref 80–99.9)
METER GLUCOSE: 126 MG/DL (ref 74–99)
METER GLUCOSE: 172 MG/DL (ref 74–99)
METER GLUCOSE: 177 MG/DL (ref 74–99)
METER GLUCOSE: 283 MG/DL (ref 74–99)
MONOCYTES ABSOLUTE: 0.58 E9/L (ref 0.1–0.95)
MONOCYTES RELATIVE PERCENT: 10.1 % (ref 2–12)
NEUTROPHILS ABSOLUTE: 3.39 E9/L (ref 1.8–7.3)
NEUTROPHILS RELATIVE PERCENT: 59.1 % (ref 43–80)
PDW BLD-RTO: 17.4 FL (ref 11.5–15)
PLATELET # BLD: 307 E9/L (ref 130–450)
PMV BLD AUTO: 9.3 FL (ref 7–12)
POTASSIUM SERPL-SCNC: 4.2 MMOL/L (ref 3.5–5)
RBC # BLD: 3.65 E12/L (ref 3.5–5.5)
RETIC HGB EQUIVALENT: 25 PG (ref 28.2–36.6)
RETICULOCYTE ABSOLUTE COUNT: 0.12 E12/L
RETICULOCYTE COUNT PCT: 3.4 % (ref 0.4–1.9)
SODIUM BLD-SCNC: 140 MMOL/L (ref 132–146)
TOTAL IRON BINDING CAPACITY: 277 MCG/DL (ref 250–450)
TROPONIN: <0.01 NG/ML (ref 0–0.03)
TROPONIN: <0.01 NG/ML (ref 0–0.03)
VITAMIN B-12: 397 PG/ML (ref 211–946)
WBC # BLD: 5.7 E9/L (ref 4.5–11.5)

## 2018-11-07 PROCEDURE — 83735 ASSAY OF MAGNESIUM: CPT

## 2018-11-07 PROCEDURE — 85045 AUTOMATED RETICULOCYTE COUNT: CPT

## 2018-11-07 PROCEDURE — 83540 ASSAY OF IRON: CPT

## 2018-11-07 PROCEDURE — 6370000000 HC RX 637 (ALT 250 FOR IP): Performed by: INTERNAL MEDICINE

## 2018-11-07 PROCEDURE — 2700000000 HC OXYGEN THERAPY PER DAY

## 2018-11-07 PROCEDURE — 82746 ASSAY OF FOLIC ACID SERUM: CPT

## 2018-11-07 PROCEDURE — 82728 ASSAY OF FERRITIN: CPT

## 2018-11-07 PROCEDURE — 85025 COMPLETE CBC W/AUTO DIFF WBC: CPT

## 2018-11-07 PROCEDURE — 2580000003 HC RX 258: Performed by: INTERNAL MEDICINE

## 2018-11-07 PROCEDURE — 94640 AIRWAY INHALATION TREATMENT: CPT

## 2018-11-07 PROCEDURE — 83550 IRON BINDING TEST: CPT

## 2018-11-07 PROCEDURE — 80048 BASIC METABOLIC PNL TOTAL CA: CPT

## 2018-11-07 PROCEDURE — 99232 SBSQ HOSP IP/OBS MODERATE 35: CPT | Performed by: INTERNAL MEDICINE

## 2018-11-07 PROCEDURE — 82607 VITAMIN B-12: CPT

## 2018-11-07 PROCEDURE — 36415 COLL VENOUS BLD VENIPUNCTURE: CPT

## 2018-11-07 PROCEDURE — 84484 ASSAY OF TROPONIN QUANT: CPT

## 2018-11-07 PROCEDURE — 82962 GLUCOSE BLOOD TEST: CPT

## 2018-11-07 PROCEDURE — 2060000000 HC ICU INTERMEDIATE R&B

## 2018-11-07 RX ADMIN — OXYCODONE AND ACETAMINOPHEN 1 TABLET: 10; 325 TABLET ORAL at 14:39

## 2018-11-07 RX ADMIN — SULFAMETHOXAZOLE AND TRIMETHOPRIM 1 TABLET: 800; 160 TABLET ORAL at 21:17

## 2018-11-07 RX ADMIN — DOCUSATE SODIUM 100 MG: 100 CAPSULE, LIQUID FILLED ORAL at 10:06

## 2018-11-07 RX ADMIN — IPRATROPIUM BROMIDE AND ALBUTEROL SULFATE 3 ML: .5; 3 SOLUTION RESPIRATORY (INHALATION) at 12:21

## 2018-11-07 RX ADMIN — DILTIAZEM HYDROCHLORIDE 240 MG: 240 CAPSULE, COATED, EXTENDED RELEASE ORAL at 09:51

## 2018-11-07 RX ADMIN — CETIRIZINE HYDROCHLORIDE 10 MG: 10 TABLET, FILM COATED ORAL at 09:51

## 2018-11-07 RX ADMIN — Medication 10 ML: at 10:07

## 2018-11-07 RX ADMIN — OXYCODONE AND ACETAMINOPHEN 1 TABLET: 10; 325 TABLET ORAL at 09:51

## 2018-11-07 RX ADMIN — OXYCODONE AND ACETAMINOPHEN 1 TABLET: 10; 325 TABLET ORAL at 04:47

## 2018-11-07 RX ADMIN — MOMETASONE FUROATE AND FORMOTEROL FUMARATE DIHYDRATE 2 PUFF: 200; 5 AEROSOL RESPIRATORY (INHALATION) at 08:41

## 2018-11-07 RX ADMIN — Medication 10 ML: at 21:17

## 2018-11-07 RX ADMIN — GABAPENTIN 400 MG: 400 CAPSULE ORAL at 09:52

## 2018-11-07 RX ADMIN — IPRATROPIUM BROMIDE AND ALBUTEROL SULFATE 3 ML: .5; 3 SOLUTION RESPIRATORY (INHALATION) at 03:44

## 2018-11-07 RX ADMIN — OXYCODONE AND ACETAMINOPHEN 1 TABLET: 10; 325 TABLET ORAL at 19:03

## 2018-11-07 RX ADMIN — IPRATROPIUM BROMIDE AND ALBUTEROL SULFATE 3 ML: .5; 3 SOLUTION RESPIRATORY (INHALATION) at 08:41

## 2018-11-07 RX ADMIN — MOMETASONE FUROATE AND FORMOTEROL FUMARATE DIHYDRATE 2 PUFF: 200; 5 AEROSOL RESPIRATORY (INHALATION) at 21:36

## 2018-11-07 RX ADMIN — INSULIN LISPRO 94 UNITS: 100 INJECTION, SOLUTION INTRAVENOUS; SUBCUTANEOUS at 16:43

## 2018-11-07 RX ADMIN — IPRATROPIUM BROMIDE AND ALBUTEROL SULFATE 3 ML: .5; 3 SOLUTION RESPIRATORY (INHALATION) at 16:02

## 2018-11-07 RX ADMIN — METOPROLOL TARTRATE 100 MG: 50 TABLET ORAL at 21:17

## 2018-11-07 RX ADMIN — INSULIN LISPRO 92 UNITS: 100 INJECTION, SOLUTION INTRAVENOUS; SUBCUTANEOUS at 12:02

## 2018-11-07 RX ADMIN — MONTELUKAST SODIUM 10 MG: 10 TABLET, FILM COATED ORAL at 21:16

## 2018-11-07 RX ADMIN — GABAPENTIN 400 MG: 400 CAPSULE ORAL at 21:16

## 2018-11-07 RX ADMIN — INSULIN LISPRO 90 UNITS: 100 INJECTION, SOLUTION INTRAVENOUS; SUBCUTANEOUS at 06:59

## 2018-11-07 RX ADMIN — GABAPENTIN 400 MG: 400 CAPSULE ORAL at 14:36

## 2018-11-07 RX ADMIN — METOPROLOL TARTRATE 100 MG: 50 TABLET ORAL at 09:17

## 2018-11-07 RX ADMIN — APIXABAN 5 MG: 5 TABLET, FILM COATED ORAL at 21:23

## 2018-11-07 RX ADMIN — SULFAMETHOXAZOLE AND TRIMETHOPRIM 1 TABLET: 800; 160 TABLET ORAL at 09:17

## 2018-11-07 RX ADMIN — TORSEMIDE 20 MG: 20 TABLET ORAL at 09:17

## 2018-11-07 RX ADMIN — APIXABAN 5 MG: 5 TABLET, FILM COATED ORAL at 09:51

## 2018-11-07 RX ADMIN — TRAZODONE HYDROCHLORIDE 50 MG: 50 TABLET ORAL at 21:16

## 2018-11-07 RX ADMIN — FENOFIBRATE 160 MG: 160 TABLET ORAL at 08:31

## 2018-11-07 RX ADMIN — ESCITALOPRAM OXALATE 20 MG: 10 TABLET ORAL at 09:51

## 2018-11-07 RX ADMIN — NYSTATIN: 100000 CREAM TOPICAL at 21:17

## 2018-11-07 RX ADMIN — PANTOPRAZOLE SODIUM 40 MG: 40 TABLET, DELAYED RELEASE ORAL at 06:53

## 2018-11-07 RX ADMIN — FLUTICASONE PROPIONATE 2 SPRAY: 50 SPRAY, METERED NASAL at 10:06

## 2018-11-07 RX ADMIN — FERROUS SULFATE TAB 325 MG (65 MG ELEMENTAL FE) 325 MG: 325 (65 FE) TAB at 10:06

## 2018-11-07 RX ADMIN — DOCUSATE SODIUM 100 MG: 100 CAPSULE, LIQUID FILLED ORAL at 14:36

## 2018-11-07 RX ADMIN — IPRATROPIUM BROMIDE AND ALBUTEROL SULFATE 3 ML: .5; 3 SOLUTION RESPIRATORY (INHALATION) at 21:36

## 2018-11-07 RX ADMIN — DOCUSATE SODIUM 100 MG: 100 CAPSULE, LIQUID FILLED ORAL at 21:16

## 2018-11-07 RX ADMIN — INSULIN GLARGINE 100 UNITS: 100 INJECTION, SOLUTION SUBCUTANEOUS at 21:27

## 2018-11-07 RX ADMIN — SPIRONOLACTONE 25 MG: 25 TABLET ORAL at 09:17

## 2018-11-07 ASSESSMENT — PAIN DESCRIPTION - PAIN TYPE
TYPE: CHRONIC PAIN
TYPE: CHRONIC PAIN

## 2018-11-07 ASSESSMENT — PAIN DESCRIPTION - ORIENTATION: ORIENTATION: RIGHT;LEFT;LOWER

## 2018-11-07 ASSESSMENT — PAIN SCALES - GENERAL
PAINLEVEL_OUTOF10: 1
PAINLEVEL_OUTOF10: 4
PAINLEVEL_OUTOF10: 5
PAINLEVEL_OUTOF10: 6
PAINLEVEL_OUTOF10: 0
PAINLEVEL_OUTOF10: 5
PAINLEVEL_OUTOF10: 0
PAINLEVEL_OUTOF10: 7

## 2018-11-07 ASSESSMENT — PAIN DESCRIPTION - DESCRIPTORS
DESCRIPTORS: DISCOMFORT
DESCRIPTORS: ACHING;DISCOMFORT;SORE

## 2018-11-07 ASSESSMENT — PAIN DESCRIPTION - LOCATION: LOCATION: BACK;LEG

## 2018-11-07 ASSESSMENT — PAIN DESCRIPTION - ONSET: ONSET: ON-GOING

## 2018-11-07 ASSESSMENT — PAIN DESCRIPTION - FREQUENCY: FREQUENCY: INTERMITTENT

## 2018-11-07 ASSESSMENT — PAIN DESCRIPTION - PROGRESSION: CLINICAL_PROGRESSION: GRADUALLY IMPROVING

## 2018-11-07 NOTE — PROGRESS NOTES
[facility] Hospitalist   Progress Note    Admitting Date and Time: 11/6/2018  2:44 PM  Admit Dx: GI bleed [K92.2]  GI bleed [K92.2]    Subjective:  Continues with sob on exertion ,denies CP ,abd pain nausea or vomiting. Has Home O2 4 L nc. ROS: denies fever, chills, cp,  n/v, HA unless stated above.  apixaban  5 mg Oral BID    diltiazem  240 mg Oral Daily    docusate sodium  100 mg Oral TID    mometasone-formoterol  2 puff Inhalation BID    escitalopram  20 mg Oral Daily    fenofibrate  160 mg Oral Daily    ferrous sulfate  325 mg Oral Daily with breakfast    cetirizine  10 mg Oral Daily    fluticasone  2 spray Each Nare Daily    gabapentin  400 mg Oral TID    insulin lispro  90 Units Subcutaneous QAM AC    insulin glargine  100 Units Subcutaneous Nightly    ipratropium-albuterol  1 vial Inhalation Q4H    pantoprazole  40 mg Oral QAM AC    metoprolol  100 mg Oral BID    montelukast  10 mg Oral Nightly    nystatin   Topical BID    spironolactone  25 mg Oral Daily    sulfamethoxazole-trimethoprim  1 tablet Oral BID    torsemide  20 mg Oral Daily    traZODone  50 mg Oral Nightly    insulin lispro  92 Units Subcutaneous Daily before lunch    insulin lispro  94 Units Subcutaneous Dinner    sodium chloride flush  10 mL Intravenous 2 times per day       albuterol sulfate HFA 2 puff Q4H PRN   ALPRAZolam 0.25 mg Nightly PRN   cyclobenzaprine 10 mg TID PRN   oxyCODONE-acetaminophen 1 tablet Q4H PRN   sodium chloride flush 10 mL PRN   magnesium hydroxide 30 mL Daily PRN   ondansetron 4 mg Q6H PRN   acetaminophen 650 mg Q4H PRN        Objective:    BP (!) 147/66   Pulse 106   Temp 98.3 °F (36.8 °C) (Oral)   Resp 16   Ht 5' 3\" (1.6 m)   Wt 272 lb 9.6 oz (123.7 kg)   LMP  (LMP Unknown)   SpO2 99%   Breastfeeding?  No   BMI 48.29 kg/m²   General Appearance: alert and oriented to person, place and time, with morbid obesity, in no acute distress  Skin: warm and dry, no rash

## 2018-11-07 NOTE — H&P
[place] [address] [facility]     History and Physical      CHIEF COMPLAINT: Palpitations, shortness of breath. History of Present Illness: The pt is a 57 y.o. female with a history of paroxysmal AFib, inappropriate sinus tachycardia, COPD with chronic SoB / Rivera, chronic hypoxia on O2 NC 4 LPM RTC, HTN, chronic diastolic CHF, chronic anemia, chronic b/l LE lymphedema, DM2, etc, who says she gained 19 lbs in 1.5 weeks on her usual meds because of which she went to see her PCP on Fri 11/02/18. Per her PCP, diuretic therapy was increased for a few days and pt was instructed to take Demadex 40 mg/d instead of her usual 20 mg/d. Pt says she has lost 9 lbs since then. However, she has continued to feel have shortness of breath at rest and dyspnea on exertion. This morning she awoke feeling somewhat short of breath and experienced rapid palpitations in the chest. Pt is chronically anemic but has not noted any blood in stool or melena recently. In the ER FOBT apparently was positive. Pt says she didn't feel good elaine d/t SoB and palpitations and so came to the ER for evaluation. Informant(s) for H&P: Patient.     REVIEW OF SYSTEMS:  Constitutional: negative for fatigue, fevers and sweats  Eyes: negative  Ears, nose, mouth, throat, and face: negative for ear drainage, earaches, epistaxis, hoarseness, nasal congestion, sore throat, tinnitus and vertigo  Respiratory: positive for dyspnea on exertion and shortness of breath, negative for cough, hemoptysis, pleurisy/chest pain, sputum, stridor and wheezing  Cardiovascular: positive for chronic b/l LE lymphedema, chronic intermittent palpitations that were worse today, mild chronic orthopnea because of which pt sleeps on a couch semi-seated, negative for chest pain, exertional chest pressure/discomfort, near-syncope, paroxysmal nocturnal dyspnea and syncope  Gastrointestinal: positive for reflux symptoms, negative for abdominal pain, constipation, diarrhea, process. EKG 11/06/2018 1458 hrs: Normal sinus rhythm, 98/m. Incomplete right bundle branch block. Left posterior fascicular block. Cannot rule out Anterior infarct , age undetermined. Abnormal ECG. When compared with ECG of 13-OCT-2018 02:41, left posterior fascicular block is now present. REVIEW OF PRIOR TESTS:  01/16/2018 Echocardiogram:  Summary  Mild concentric left ventricular hypertrophy. Ejection fraction is visually estimated at 55-65%. Stage II diastolic dysfunction. The left atrium is severely dilated. Elevated L atrial pressure  Mild mitral regurgitation  The aortic valve appears mildly sclerotic. Pulmonary hypertension is mild to moderate . No evidence of pericardial effusion. ASSESSMENT:      Principal Problem:    Palpitations  Active Problems:    Dyspnea on exertion    Occult blood in stools    Chronic anemia    COPD (chronic obstructive pulmonary disease) ([facility])    Chronic respiratory failure with hypoxia ([facility])    Hypertension    PAF (paroxysmal atrial fibrillation) ([facility]) - currently in SR    Pulmonary hypertension    Chronic diastolic heart failure ([facility])    Chronic anticoagulation    Type 2 diabetes mellitus with diabetic neuropathy, with long-term current use of insulin ([facility])    Hyperlipidemia    Anxiety and depression  Resolved Problems:    * No resolved hospital problems. *      PLAN:    · No clear etiology for acute sx of palpitations and Rivera / SoB. Pt is chronically anemic and there is no significant acute drop in Hg. Pt has chronic diastolic HF with recent marked weight gain but in the last 4 days she has lost 9 lbs d/t increased diuretic dosing and at present pt doesn't appear to be in acute CHF. Pt doesn't appear to have acute exacerbation of COPD at present. Have to consider anxiety and pulm HTN as causes of palpitations and Rivera respectively. · F/u Hg. · GI consult in AM.  · Continue PPI as prior. · Continue meds for COPD as prior - albuterol, Dulera, Singulair. Continue O2 NC as prior. F/u sO2. · HTN / PAF / pulm HTN / chronic diastolic HF: Continue Cardizem CD, lopressor, spironolactone as prior. Give a dose of Demadex 20 mg PO now and resume Demadex 20 mg po daily from AM as prior to last week. F/u BP, I/O, weight, labs. · Continue Eliquis [address] as prior. · DM: Carb controlled diet for now. Continue high dose insulin regimen - Lantus 100 units nightly, Humalog 90, 92, 94 units respectively prior to meals. F/u BGM. · Hyperlipidemia: Continue meds as prior. · Anxiety and depression: Continue Xanax, Lexapro, Trazodone as prior. Adv to completely avoid caffeine. · Chronic pain: Continue meds as prior including Neurontin, Percocet, Flexeril. Adv to avoid cooling exposure. · DVT prophylaxis - Continue Eliquis OAC as prior. · GI bleed prophylaxis - PPI as prior.       Please note that over 50 minutes was spent in evaluating the patient, review of records and results, discussion with staff, etc.    Electronically signed by Lázaro Valles Hospitalist Service at [facility]

## 2018-11-07 NOTE — PLAN OF CARE
Problem: Pain Control  Goal: Improvement in pain related behaviors BP/HR WNL  Outcome: Met This Shift

## 2018-11-07 NOTE — FLOWSHEET NOTE
Inpatient Wound Care    Admit Date: 11/6/2018  2:44 PM    Reason for consult:  Legs    Significant history:  Admitted with palpitations. History includes: CHF and COPD    Wound history:  POA    Findings:       11/07/18 1626   Wound 11/06/18 Venous ulcer Leg Left venous ulcer   Date First Assessed/Time First Assessed: 11/06/18 1606   Wound Type: Venous ulcer  Wound Event: Gradually Appeared  Location: Leg  Wound Location Orientation: Left  Wound Description (Comments): venous ulcer  Pre-existing: Yes  Photo Taken: No   Wound Type Wound   Dressing Status Changed   Dressing Changed Changed/New   Dressing/Treatment Alginate  (ABD, Kerlix, Ace)   Wound Cleansed (foam cleanser)   Dressing Change Due 11/08/18   Wound Length (cm) 10 cm   Wound Width (cm) 24 cm   Wound Depth (cm)  0.2   Calculated Wound Size (cm^2) (l*w) 240 cm^2   Change in Wound Size % (l*w) 39.74   Wound Assessment Red   Drainage Amount Large   Drainage Description Serous   Odor Strong   Ely-wound Assessment Edema; Maceration   Wound 10/09/18 Venous ulcer Leg Right; Anterior; Lateral   Date First Assessed/Time First Assessed: 10/09/18 1852   Wound Type: Venous ulcer  Location: Leg  Wound Location Orientation: Right; Anterior; Lateral  Pre-existing: Yes  Photo Taken: No   Wound Type Wound   Dressing Status Changed   Dressing Changed Changed/New   Dressing/Treatment Alginate  (ABD, Kerlix, Ace)   Wound Cleansed (foam cleanser)   Dressing Change Due 11/08/18   Wound Length (cm) 5 cm   Wound Width (cm) 5 cm   Wound Depth (cm)  0.2   Calculated Wound Size (cm^2) (l*w) 25 cm^2   Change in Wound Size % (l*w) -42.05   Wound Assessment Red   Drainage Amount Moderate   Drainage Description Serous   Odor Mild   Ely-wound Assessment Edema       Impression:  Venous stasis ulcers, lymphedema. L leg is worse than R. The L. Leg is almost circumferential. Intertrigo in skin folds    Interventions in place:  Legs washed with foam cleanser, patted dry.  Opticell applied to

## 2018-11-08 LAB
ANION GAP SERPL CALCULATED.3IONS-SCNC: 10 MMOL/L (ref 7–16)
BASOPHILS ABSOLUTE: 0.03 E9/L (ref 0–0.2)
BASOPHILS RELATIVE PERCENT: 0.6 % (ref 0–2)
BUN BLDV-MCNC: 23 MG/DL (ref 8–23)
CALCIUM SERPL-MCNC: 9.2 MG/DL (ref 8.6–10.2)
CHLORIDE BLD-SCNC: 95 MMOL/L (ref 98–107)
CO2: 34 MMOL/L (ref 22–29)
CREAT SERPL-MCNC: 1.1 MG/DL (ref 0.5–1)
EOSINOPHILS ABSOLUTE: 0.19 E9/L (ref 0.05–0.5)
EOSINOPHILS RELATIVE PERCENT: 3.8 % (ref 0–6)
GFR AFRICAN AMERICAN: >60
GFR NON-AFRICAN AMERICAN: 50 ML/MIN/1.73
GLUCOSE BLD-MCNC: 173 MG/DL (ref 74–99)
HCT VFR BLD CALC: 29 % (ref 34–48)
HEMOGLOBIN: 8.3 G/DL (ref 11.5–15.5)
IMMATURE GRANULOCYTES #: 0.02 E9/L
IMMATURE GRANULOCYTES %: 0.4 % (ref 0–5)
LYMPHOCYTES ABSOLUTE: 1.42 E9/L (ref 1.5–4)
LYMPHOCYTES RELATIVE PERCENT: 28.7 % (ref 20–42)
MCH RBC QN AUTO: 23.8 PG (ref 26–35)
MCHC RBC AUTO-ENTMCNC: 28.6 % (ref 32–34.5)
MCV RBC AUTO: 83.1 FL (ref 80–99.9)
METER GLUCOSE: 138 MG/DL (ref 74–99)
METER GLUCOSE: 176 MG/DL (ref 74–99)
METER GLUCOSE: 192 MG/DL (ref 74–99)
METER GLUCOSE: 264 MG/DL (ref 74–99)
METER GLUCOSE: 325 MG/DL (ref 74–99)
METER GLUCOSE: 83 MG/DL (ref 74–99)
MONOCYTES ABSOLUTE: 0.47 E9/L (ref 0.1–0.95)
MONOCYTES RELATIVE PERCENT: 9.5 % (ref 2–12)
NEUTROPHILS ABSOLUTE: 2.82 E9/L (ref 1.8–7.3)
NEUTROPHILS RELATIVE PERCENT: 57 % (ref 43–80)
PDW BLD-RTO: 17.5 FL (ref 11.5–15)
PLATELET # BLD: 284 E9/L (ref 130–450)
PMV BLD AUTO: 9.4 FL (ref 7–12)
POTASSIUM REFLEX MAGNESIUM: 4.2 MMOL/L (ref 3.5–5)
RBC # BLD: 3.49 E12/L (ref 3.5–5.5)
SODIUM BLD-SCNC: 139 MMOL/L (ref 132–146)
WBC # BLD: 5 E9/L (ref 4.5–11.5)

## 2018-11-08 PROCEDURE — 94640 AIRWAY INHALATION TREATMENT: CPT

## 2018-11-08 PROCEDURE — 6370000000 HC RX 637 (ALT 250 FOR IP): Performed by: INTERNAL MEDICINE

## 2018-11-08 PROCEDURE — 99232 SBSQ HOSP IP/OBS MODERATE 35: CPT | Performed by: INTERNAL MEDICINE

## 2018-11-08 PROCEDURE — 85025 COMPLETE CBC W/AUTO DIFF WBC: CPT

## 2018-11-08 PROCEDURE — 82962 GLUCOSE BLOOD TEST: CPT

## 2018-11-08 PROCEDURE — 2060000000 HC ICU INTERMEDIATE R&B

## 2018-11-08 PROCEDURE — 2700000000 HC OXYGEN THERAPY PER DAY

## 2018-11-08 PROCEDURE — 36415 COLL VENOUS BLD VENIPUNCTURE: CPT

## 2018-11-08 PROCEDURE — 80048 BASIC METABOLIC PNL TOTAL CA: CPT

## 2018-11-08 PROCEDURE — 2580000003 HC RX 258: Performed by: INTERNAL MEDICINE

## 2018-11-08 RX ADMIN — Medication 10 ML: at 08:14

## 2018-11-08 RX ADMIN — TRAZODONE HYDROCHLORIDE 50 MG: 50 TABLET ORAL at 22:03

## 2018-11-08 RX ADMIN — ESCITALOPRAM OXALATE 20 MG: 10 TABLET ORAL at 08:06

## 2018-11-08 RX ADMIN — IPRATROPIUM BROMIDE AND ALBUTEROL SULFATE 3 ML: .5; 3 SOLUTION RESPIRATORY (INHALATION) at 20:43

## 2018-11-08 RX ADMIN — METOPROLOL TARTRATE 100 MG: 50 TABLET ORAL at 22:02

## 2018-11-08 RX ADMIN — OXYCODONE AND ACETAMINOPHEN 1 TABLET: 10; 325 TABLET ORAL at 15:02

## 2018-11-08 RX ADMIN — NYSTATIN: 100000 CREAM TOPICAL at 22:09

## 2018-11-08 RX ADMIN — IPRATROPIUM BROMIDE AND ALBUTEROL SULFATE 3 ML: .5; 3 SOLUTION RESPIRATORY (INHALATION) at 12:16

## 2018-11-08 RX ADMIN — GABAPENTIN 400 MG: 400 CAPSULE ORAL at 22:03

## 2018-11-08 RX ADMIN — FENOFIBRATE 160 MG: 160 TABLET ORAL at 09:38

## 2018-11-08 RX ADMIN — GABAPENTIN 400 MG: 400 CAPSULE ORAL at 08:07

## 2018-11-08 RX ADMIN — OXYCODONE AND ACETAMINOPHEN 1 TABLET: 10; 325 TABLET ORAL at 00:13

## 2018-11-08 RX ADMIN — Medication 10 ML: at 22:09

## 2018-11-08 RX ADMIN — DOCUSATE SODIUM 100 MG: 100 CAPSULE, LIQUID FILLED ORAL at 08:06

## 2018-11-08 RX ADMIN — CETIRIZINE HYDROCHLORIDE 10 MG: 10 TABLET, FILM COATED ORAL at 09:38

## 2018-11-08 RX ADMIN — SULFAMETHOXAZOLE AND TRIMETHOPRIM 1 TABLET: 800; 160 TABLET ORAL at 09:38

## 2018-11-08 RX ADMIN — INSULIN LISPRO 94 UNITS: 100 INJECTION, SOLUTION INTRAVENOUS; SUBCUTANEOUS at 16:54

## 2018-11-08 RX ADMIN — OXYCODONE AND ACETAMINOPHEN 1 TABLET: 10; 325 TABLET ORAL at 22:03

## 2018-11-08 RX ADMIN — GABAPENTIN 400 MG: 400 CAPSULE ORAL at 13:49

## 2018-11-08 RX ADMIN — APIXABAN 5 MG: 5 TABLET, FILM COATED ORAL at 22:03

## 2018-11-08 RX ADMIN — NYSTATIN: 100000 CREAM TOPICAL at 08:09

## 2018-11-08 RX ADMIN — IPRATROPIUM BROMIDE AND ALBUTEROL SULFATE 3 ML: .5; 3 SOLUTION RESPIRATORY (INHALATION) at 08:18

## 2018-11-08 RX ADMIN — DOCUSATE SODIUM 100 MG: 100 CAPSULE, LIQUID FILLED ORAL at 13:49

## 2018-11-08 RX ADMIN — APIXABAN 5 MG: 5 TABLET, FILM COATED ORAL at 08:07

## 2018-11-08 RX ADMIN — DILTIAZEM HYDROCHLORIDE 240 MG: 240 CAPSULE, COATED, EXTENDED RELEASE ORAL at 08:06

## 2018-11-08 RX ADMIN — PETROLATUM: 42 OINTMENT TOPICAL at 18:22

## 2018-11-08 RX ADMIN — METOPROLOL TARTRATE 100 MG: 50 TABLET ORAL at 08:06

## 2018-11-08 RX ADMIN — TORSEMIDE 20 MG: 20 TABLET ORAL at 08:06

## 2018-11-08 RX ADMIN — OXYCODONE AND ACETAMINOPHEN 1 TABLET: 10; 325 TABLET ORAL at 08:05

## 2018-11-08 RX ADMIN — MOMETASONE FUROATE AND FORMOTEROL FUMARATE DIHYDRATE 2 PUFF: 200; 5 AEROSOL RESPIRATORY (INHALATION) at 08:18

## 2018-11-08 RX ADMIN — PANTOPRAZOLE SODIUM 40 MG: 40 TABLET, DELAYED RELEASE ORAL at 06:30

## 2018-11-08 RX ADMIN — SULFAMETHOXAZOLE AND TRIMETHOPRIM 1 TABLET: 800; 160 TABLET ORAL at 22:02

## 2018-11-08 RX ADMIN — DOCUSATE SODIUM 100 MG: 100 CAPSULE, LIQUID FILLED ORAL at 22:03

## 2018-11-08 RX ADMIN — MONTELUKAST SODIUM 10 MG: 10 TABLET, FILM COATED ORAL at 22:03

## 2018-11-08 RX ADMIN — FERROUS SULFATE TAB 325 MG (65 MG ELEMENTAL FE) 325 MG: 325 (65 FE) TAB at 08:06

## 2018-11-08 RX ADMIN — SPIRONOLACTONE 25 MG: 25 TABLET ORAL at 08:06

## 2018-11-08 RX ADMIN — FLUTICASONE PROPIONATE 2 SPRAY: 50 SPRAY, METERED NASAL at 08:10

## 2018-11-08 RX ADMIN — INSULIN LISPRO 90 UNITS: 100 INJECTION, SOLUTION INTRAVENOUS; SUBCUTANEOUS at 06:39

## 2018-11-08 RX ADMIN — INSULIN GLARGINE 100 UNITS: 100 INJECTION, SOLUTION SUBCUTANEOUS at 22:09

## 2018-11-08 RX ADMIN — MOMETASONE FUROATE AND FORMOTEROL FUMARATE DIHYDRATE 2 PUFF: 200; 5 AEROSOL RESPIRATORY (INHALATION) at 20:52

## 2018-11-08 ASSESSMENT — PAIN DESCRIPTION - ONSET
ONSET: GRADUAL
ONSET: ON-GOING

## 2018-11-08 ASSESSMENT — PAIN SCALES - GENERAL
PAINLEVEL_OUTOF10: 5
PAINLEVEL_OUTOF10: 5
PAINLEVEL_OUTOF10: 8
PAINLEVEL_OUTOF10: 4
PAINLEVEL_OUTOF10: 5

## 2018-11-08 ASSESSMENT — PAIN DESCRIPTION - ORIENTATION
ORIENTATION: RIGHT;LEFT
ORIENTATION: LEFT;RIGHT

## 2018-11-08 ASSESSMENT — PAIN DESCRIPTION - PAIN TYPE
TYPE: ACUTE PAIN
TYPE: ACUTE PAIN

## 2018-11-08 ASSESSMENT — PAIN DESCRIPTION - FREQUENCY
FREQUENCY: INTERMITTENT
FREQUENCY: CONTINUOUS

## 2018-11-08 ASSESSMENT — PAIN DESCRIPTION - DESCRIPTORS
DESCRIPTORS: ACHING;SORE;PRESSURE
DESCRIPTORS: DISCOMFORT;PRESSURE;ACHING

## 2018-11-08 ASSESSMENT — PAIN DESCRIPTION - LOCATION
LOCATION: LEG
LOCATION: LEG

## 2018-11-08 ASSESSMENT — PAIN DESCRIPTION - PROGRESSION: CLINICAL_PROGRESSION: NOT CHANGED

## 2018-11-08 NOTE — CONSULTS
BILATERAL KNEES    TONSILLECTOMY  1963    UPPER GASTROINTESTINAL ENDOSCOPY  1/20/2004    gastritis, bx (no H pylori), Dr. Vogel [facility], [facility] [place] [address] GASTROINTESTINAL ENDOSCOPY N/A 6/1/2018    EGD BIOPSY performed by Sadie Mccarty MD at [facility] ENDOSCOPY     Current Medications:    Current Facility-Administered Medications: mineral oil-hydrophilic petrolatum (AQUAPHOR) ointment, , Topical, Daily  albuterol sulfate  (90 Base) MCG/ACT inhaler 2 puff, 2 puff, Inhalation, Q4H PRN  ALPRAZolam (XANAX) tablet 0.25 mg, 0.25 mg, Oral, Nightly PRN  apixaban (ELIQUIS) tablet 5 mg, 5 mg, Oral, BID  cyclobenzaprine (FLEXERIL) tablet 10 mg, 10 mg, Oral, TID PRN  diltiazem (CARDIZEM CD) extended release capsule 240 mg, 240 mg, Oral, Daily  docusate sodium (COLACE) capsule 100 mg, 100 mg, Oral, TID  mometasone-formoterol (DULERA) 200-5 MCG/ACT inhaler 2 puff, 2 puff, Inhalation, BID  escitalopram (LEXAPRO) tablet 20 mg, 20 mg, Oral, Daily  fenofibrate tablet 160 mg, 160 mg, Oral, Daily  ferrous sulfate tablet 325 mg, 325 mg, Oral, Daily with breakfast  cetirizine (ZYRTEC) tablet 10 mg, 10 mg, Oral, Daily  fluticasone (FLONASE) 50 MCG/ACT nasal spray 2 spray, 2 spray, Each Nare, Daily  gabapentin (NEURONTIN) capsule 400 mg, 400 mg, Oral, TID  insulin lispro (HUMALOG) injection vial 90 Units, 90 Units, Subcutaneous, QAM AC  insulin glargine (LANTUS) injection vial 100 Units, 100 Units, Subcutaneous, Nightly  ipratropium-albuterol (DUONEB) nebulizer solution 3 mL, 1 vial, Inhalation, Q4H  pantoprazole (PROTONIX) tablet 40 mg, 40 mg, Oral, QAM AC  metoprolol tartrate (LOPRESSOR) tablet 100 mg, 100 mg, Oral, BID  montelukast (SINGULAIR) tablet 10 mg, 10 mg, Oral, Nightly  nystatin (MYCOSTATIN) cream, , Topical, BID  oxyCODONE-acetaminophen (PERCOCET)  MG per tablet 1 tablet, 1 tablet, Oral, Q4H PRN  spironolactone (ALDACTONE) tablet 25 mg, 25 mg, Oral, Daily  sulfamethoxazole-trimethoprim (BACTRIM DS;SEPTRA DS) 800-160 MG per tablet 1 tablet, 1 tablet, Oral, BID  torsemide (DEMADEX) tablet 20 mg, 20 mg, Oral, Daily  traZODone (DESYREL) tablet 50 mg, 50 mg, Oral, Nightly  insulin lispro (HUMALOG) injection vial 92 Units, 92 Units, Subcutaneous, Daily before lunch  insulin lispro (HUMALOG) injection vial 94 Units, 94 Units, Subcutaneous, Dinner  sodium chloride flush 0.9 % injection 10 mL, 10 mL, Intravenous, 2 times per day  sodium chloride flush 0.9 % injection 10 mL, 10 mL, Intravenous, PRN  magnesium hydroxide (MILK OF MAGNESIA) 400 MG/5ML suspension 30 mL, 30 mL, Oral, Daily PRN  ondansetron (ZOFRAN) injection 4 mg, 4 mg, Intravenous, Q6H PRN  acetaminophen (TYLENOL) tablet 650 mg, 650 mg, Oral, Q4H PRN    Allergies:  Statins    Social History:    Tobacco:  Pt reports Quit 16 yrs ago; prior to that smoked 1 PPD for 15 yrs  Alcohol:  Denies use  Illicit Drugs: Denies use     Family History: Mother-- from CHF  Father-- from complications of colon cancer;Medical history of diabetes  2 Sisters-both alive; one with colon cancer  2 Sons livin with CAD and cardiac stents; 1 healthy. 1 Daughter--alive and healthy       REVIEW OF SYSTEMS:    Aside from what was mentioned in the PMH and HPI, essentially unremarkable, all others negative. PHYSICAL EXAM:      Vitals:    BP (!) 154/68   Pulse 89   Temp 98 °F (36.7 °C) (Oral)   Resp 17   Ht 5' 3\" (1.6 m)   Wt 273 lb 9.6 oz (124.1 kg)   LMP  (LMP Unknown)   SpO2 96%   Breastfeeding? No   BMI 48.47 kg/m²     CONSTITUTIONAL:  awake, alert, cooperative, no apparent distress, sitting up in chair, and appears stated age  EYES:  pupils equal, round and reactive to light, sclera anicteric and conjunctiva pale  ENT:  normocephalic, oral pharynx with moist mucous membranes  NECK:  supple   LUNGS:  diminished but clear to auscultation bilaterally.   CARDIOVASCULAR:  regular rate and rhythm, no murmur noted; 2+ pulses; gross lymphedema to BLE > LLE  ABDOMEN:  large, normal

## 2018-11-08 NOTE — PLAN OF CARE
Problem: Pain Control  Goal: Maintain pain level at or below patient's acceptable level (or 5 if patient is unable to determine acceptable level)  Outcome: Met This Shift    Goal: Improvement in pain related behaviors BP/HR WNL  Outcome: Met This Shift

## 2018-11-09 ENCOUNTER — ANESTHESIA (OUTPATIENT)
Dept: ENDOSCOPY | Age: 63
DRG: 253 | End: 2018-11-09
Payer: COMMERCIAL

## 2018-11-09 ENCOUNTER — ANESTHESIA EVENT (OUTPATIENT)
Dept: ENDOSCOPY | Age: 63
DRG: 253 | End: 2018-11-09
Payer: COMMERCIAL

## 2018-11-09 VITALS
DIASTOLIC BLOOD PRESSURE: 51 MMHG | SYSTOLIC BLOOD PRESSURE: 98 MMHG | OXYGEN SATURATION: 97 % | RESPIRATION RATE: 21 BRPM

## 2018-11-09 LAB
ANION GAP SERPL CALCULATED.3IONS-SCNC: 12 MMOL/L (ref 7–16)
BUN BLDV-MCNC: 25 MG/DL (ref 8–23)
CALCIUM SERPL-MCNC: 9.4 MG/DL (ref 8.6–10.2)
CHLORIDE BLD-SCNC: 94 MMOL/L (ref 98–107)
CO2: 31 MMOL/L (ref 22–29)
CREAT SERPL-MCNC: 1.1 MG/DL (ref 0.5–1)
GFR AFRICAN AMERICAN: >60
GFR NON-AFRICAN AMERICAN: 50 ML/MIN/1.73
GLUCOSE BLD-MCNC: 125 MG/DL (ref 74–99)
HCT VFR BLD CALC: 29.9 % (ref 34–48)
HEMOGLOBIN: 8.8 G/DL (ref 11.5–15.5)
MCH RBC QN AUTO: 24.2 PG (ref 26–35)
MCHC RBC AUTO-ENTMCNC: 29.4 % (ref 32–34.5)
MCV RBC AUTO: 82.1 FL (ref 80–99.9)
METER GLUCOSE: 124 MG/DL (ref 74–99)
METER GLUCOSE: 126 MG/DL (ref 74–99)
METER GLUCOSE: 129 MG/DL (ref 74–99)
METER GLUCOSE: 197 MG/DL (ref 74–99)
PDW BLD-RTO: 17.9 FL (ref 11.5–15)
PLATELET # BLD: 280 E9/L (ref 130–450)
PMV BLD AUTO: 9.1 FL (ref 7–12)
POTASSIUM SERPL-SCNC: 4.4 MMOL/L (ref 3.5–5)
RBC # BLD: 3.64 E12/L (ref 3.5–5.5)
SODIUM BLD-SCNC: 137 MMOL/L (ref 132–146)
WBC # BLD: 6.4 E9/L (ref 4.5–11.5)

## 2018-11-09 PROCEDURE — 36415 COLL VENOUS BLD VENIPUNCTURE: CPT

## 2018-11-09 PROCEDURE — 88305 TISSUE EXAM BY PATHOLOGIST: CPT

## 2018-11-09 PROCEDURE — 3609012400 HC EGD TRANSORAL BIOPSY SINGLE/MULTIPLE: Performed by: INTERNAL MEDICINE

## 2018-11-09 PROCEDURE — 94640 AIRWAY INHALATION TREATMENT: CPT

## 2018-11-09 PROCEDURE — 2500000003 HC RX 250 WO HCPCS: Performed by: NURSE ANESTHETIST, CERTIFIED REGISTERED

## 2018-11-09 PROCEDURE — 6370000000 HC RX 637 (ALT 250 FOR IP): Performed by: INTERNAL MEDICINE

## 2018-11-09 PROCEDURE — 0DB98ZX EXCISION OF DUODENUM, VIA NATURAL OR ARTIFICIAL OPENING ENDOSCOPIC, DIAGNOSTIC: ICD-10-PCS | Performed by: INTERNAL MEDICINE

## 2018-11-09 PROCEDURE — 80048 BASIC METABOLIC PNL TOTAL CA: CPT

## 2018-11-09 PROCEDURE — 2700000000 HC OXYGEN THERAPY PER DAY

## 2018-11-09 PROCEDURE — 3700000000 HC ANESTHESIA ATTENDED CARE: Performed by: INTERNAL MEDICINE

## 2018-11-09 PROCEDURE — 85027 COMPLETE CBC AUTOMATED: CPT

## 2018-11-09 PROCEDURE — 6360000002 HC RX W HCPCS: Performed by: NURSE ANESTHETIST, CERTIFIED REGISTERED

## 2018-11-09 PROCEDURE — 3700000001 HC ADD 15 MINUTES (ANESTHESIA): Performed by: INTERNAL MEDICINE

## 2018-11-09 PROCEDURE — 0DB68ZX EXCISION OF STOMACH, VIA NATURAL OR ARTIFICIAL OPENING ENDOSCOPIC, DIAGNOSTIC: ICD-10-PCS | Performed by: INTERNAL MEDICINE

## 2018-11-09 PROCEDURE — 2060000000 HC ICU INTERMEDIATE R&B

## 2018-11-09 PROCEDURE — 2709999900 HC NON-CHARGEABLE SUPPLY: Performed by: INTERNAL MEDICINE

## 2018-11-09 PROCEDURE — 7100000010 HC PHASE II RECOVERY - FIRST 15 MIN: Performed by: INTERNAL MEDICINE

## 2018-11-09 PROCEDURE — 87081 CULTURE SCREEN ONLY: CPT

## 2018-11-09 PROCEDURE — 82962 GLUCOSE BLOOD TEST: CPT

## 2018-11-09 PROCEDURE — 99232 SBSQ HOSP IP/OBS MODERATE 35: CPT | Performed by: INTERNAL MEDICINE

## 2018-11-09 PROCEDURE — 7100000011 HC PHASE II RECOVERY - ADDTL 15 MIN: Performed by: INTERNAL MEDICINE

## 2018-11-09 PROCEDURE — 2580000003 HC RX 258: Performed by: INTERNAL MEDICINE

## 2018-11-09 PROCEDURE — 2580000003 HC RX 258: Performed by: NURSE ANESTHETIST, CERTIFIED REGISTERED

## 2018-11-09 RX ORDER — LIDOCAINE HYDROCHLORIDE 20 MG/ML
INJECTION, SOLUTION INFILTRATION; PERINEURAL PRN
Status: DISCONTINUED | OUTPATIENT
Start: 2018-11-09 | End: 2018-11-09 | Stop reason: SDUPTHER

## 2018-11-09 RX ORDER — PROPOFOL 10 MG/ML
INJECTION, EMULSION INTRAVENOUS PRN
Status: DISCONTINUED | OUTPATIENT
Start: 2018-11-09 | End: 2018-11-09 | Stop reason: SDUPTHER

## 2018-11-09 RX ORDER — GLYCOPYRROLATE 0.2 MG/ML
INJECTION INTRAMUSCULAR; INTRAVENOUS PRN
Status: DISCONTINUED | OUTPATIENT
Start: 2018-11-09 | End: 2018-11-09 | Stop reason: SDUPTHER

## 2018-11-09 RX ORDER — SODIUM CHLORIDE 9 MG/ML
INJECTION, SOLUTION INTRAVENOUS CONTINUOUS PRN
Status: DISCONTINUED | OUTPATIENT
Start: 2018-11-09 | End: 2018-11-09 | Stop reason: SDUPTHER

## 2018-11-09 RX ADMIN — DILTIAZEM HYDROCHLORIDE 240 MG: 240 CAPSULE, COATED, EXTENDED RELEASE ORAL at 09:28

## 2018-11-09 RX ADMIN — SULFAMETHOXAZOLE AND TRIMETHOPRIM 1 TABLET: 800; 160 TABLET ORAL at 20:49

## 2018-11-09 RX ADMIN — PANTOPRAZOLE SODIUM 40 MG: 40 TABLET, DELAYED RELEASE ORAL at 06:27

## 2018-11-09 RX ADMIN — LIDOCAINE HYDROCHLORIDE 40 MG: 20 INJECTION, SOLUTION INFILTRATION; PERINEURAL at 12:46

## 2018-11-09 RX ADMIN — IPRATROPIUM BROMIDE AND ALBUTEROL SULFATE 3 ML: .5; 3 SOLUTION RESPIRATORY (INHALATION) at 20:41

## 2018-11-09 RX ADMIN — FERROUS SULFATE TAB 325 MG (65 MG ELEMENTAL FE) 325 MG: 325 (65 FE) TAB at 08:06

## 2018-11-09 RX ADMIN — OXYCODONE AND ACETAMINOPHEN 1 TABLET: 10; 325 TABLET ORAL at 03:08

## 2018-11-09 RX ADMIN — PROPOFOL 180 MG: 10 INJECTION, EMULSION INTRAVENOUS at 12:46

## 2018-11-09 RX ADMIN — Medication 10 ML: at 09:00

## 2018-11-09 RX ADMIN — DOCUSATE SODIUM 100 MG: 100 CAPSULE, LIQUID FILLED ORAL at 20:50

## 2018-11-09 RX ADMIN — CETIRIZINE HYDROCHLORIDE 10 MG: 10 TABLET, FILM COATED ORAL at 09:27

## 2018-11-09 RX ADMIN — SULFAMETHOXAZOLE AND TRIMETHOPRIM 1 TABLET: 800; 160 TABLET ORAL at 09:27

## 2018-11-09 RX ADMIN — OXYCODONE AND ACETAMINOPHEN 1 TABLET: 10; 325 TABLET ORAL at 17:52

## 2018-11-09 RX ADMIN — INSULIN GLARGINE 100 UNITS: 100 INJECTION, SOLUTION SUBCUTANEOUS at 20:50

## 2018-11-09 RX ADMIN — NYSTATIN: 100000 CREAM TOPICAL at 20:57

## 2018-11-09 RX ADMIN — DOCUSATE SODIUM 100 MG: 100 CAPSULE, LIQUID FILLED ORAL at 14:27

## 2018-11-09 RX ADMIN — NYSTATIN: 100000 CREAM TOPICAL at 09:28

## 2018-11-09 RX ADMIN — SPIRONOLACTONE 25 MG: 25 TABLET ORAL at 09:28

## 2018-11-09 RX ADMIN — DOCUSATE SODIUM 100 MG: 100 CAPSULE, LIQUID FILLED ORAL at 09:28

## 2018-11-09 RX ADMIN — TRAZODONE HYDROCHLORIDE 50 MG: 50 TABLET ORAL at 20:50

## 2018-11-09 RX ADMIN — METOPROLOL TARTRATE 100 MG: 50 TABLET ORAL at 09:27

## 2018-11-09 RX ADMIN — GABAPENTIN 400 MG: 400 CAPSULE ORAL at 20:50

## 2018-11-09 RX ADMIN — IPRATROPIUM BROMIDE AND ALBUTEROL SULFATE 3 ML: .5; 3 SOLUTION RESPIRATORY (INHALATION) at 04:22

## 2018-11-09 RX ADMIN — GLYCOPYRROLATE 0.1 MG: 0.2 INJECTION, SOLUTION INTRAMUSCULAR; INTRAVENOUS at 12:46

## 2018-11-09 RX ADMIN — MONTELUKAST SODIUM 10 MG: 10 TABLET, FILM COATED ORAL at 20:57

## 2018-11-09 RX ADMIN — MOMETASONE FUROATE AND FORMOTEROL FUMARATE DIHYDRATE 2 PUFF: 200; 5 AEROSOL RESPIRATORY (INHALATION) at 20:41

## 2018-11-09 RX ADMIN — SODIUM CHLORIDE: 9 INJECTION, SOLUTION INTRAVENOUS at 12:43

## 2018-11-09 RX ADMIN — IPRATROPIUM BROMIDE AND ALBUTEROL SULFATE 3 ML: .5; 3 SOLUTION RESPIRATORY (INHALATION) at 17:14

## 2018-11-09 RX ADMIN — APIXABAN 5 MG: 5 TABLET, FILM COATED ORAL at 20:50

## 2018-11-09 RX ADMIN — MOMETASONE FUROATE AND FORMOTEROL FUMARATE DIHYDRATE 2 PUFF: 200; 5 AEROSOL RESPIRATORY (INHALATION) at 09:06

## 2018-11-09 RX ADMIN — IPRATROPIUM BROMIDE AND ALBUTEROL SULFATE 3 ML: .5; 3 SOLUTION RESPIRATORY (INHALATION) at 00:22

## 2018-11-09 RX ADMIN — TORSEMIDE 20 MG: 20 TABLET ORAL at 09:27

## 2018-11-09 RX ADMIN — FLUTICASONE PROPIONATE 2 SPRAY: 50 SPRAY, METERED NASAL at 09:28

## 2018-11-09 RX ADMIN — INSULIN LISPRO 94 UNITS: 100 INJECTION, SOLUTION INTRAVENOUS; SUBCUTANEOUS at 17:50

## 2018-11-09 RX ADMIN — METOPROLOL TARTRATE 100 MG: 50 TABLET ORAL at 20:50

## 2018-11-09 RX ADMIN — ESCITALOPRAM OXALATE 20 MG: 10 TABLET ORAL at 09:28

## 2018-11-09 RX ADMIN — GABAPENTIN 400 MG: 400 CAPSULE ORAL at 14:27

## 2018-11-09 RX ADMIN — IPRATROPIUM BROMIDE AND ALBUTEROL SULFATE 3 ML: .5; 3 SOLUTION RESPIRATORY (INHALATION) at 09:06

## 2018-11-09 RX ADMIN — GABAPENTIN 400 MG: 400 CAPSULE ORAL at 09:28

## 2018-11-09 RX ADMIN — FENOFIBRATE 160 MG: 160 TABLET ORAL at 09:28

## 2018-11-09 RX ADMIN — Medication 10 ML: at 20:57

## 2018-11-09 ASSESSMENT — PAIN SCALES - GENERAL
PAINLEVEL_OUTOF10: 0
PAINLEVEL_OUTOF10: 0
PAINLEVEL_OUTOF10: 6
PAINLEVEL_OUTOF10: 2
PAINLEVEL_OUTOF10: 7

## 2018-11-09 ASSESSMENT — ENCOUNTER SYMPTOMS: SHORTNESS OF BREATH: 1

## 2018-11-09 NOTE — ANESTHESIA PRE PROCEDURE
Department of Anesthesiology  Preprocedure Note       Name:  Vignesh Bansal   Age:  57 y.o.  :  1955                                          MRN:  [id number]         Date:  2018      Surgeon: Patric Baltazar):  Delfin Lancaster MD    Procedure: Procedure(s):  EGD ESOPHAGOGASTRODUODENOSCOPY    Medications prior to admission:   Prior to Admission medications    Medication Sig Start Date End Date Taking? Authorizing Provider   ALPRAZolam (XANAX) 0.25 MG tablet Take 1 tablet by mouth nightly as needed for Anxiety for up to 30 days. . 18  Jennifer Alvarez DO   sulfamethoxazole-trimethoprim (BACTRIM DS;SEPTRA DS) 800-160 MG per tablet Take 1 tablet by mouth 2 times daily    Historical Provider, MD   albuterol sulfate HFA (VENTOLIN HFA) 108 (90 Base) MCG/ACT inhaler INHALE 2 PUFFS INTO LUNGS EVERY 4 HOURS AS NEEDED FOR WHEEZING OR SHORTNESS OF BREATH 10/17/18   Jennifer Alvarez DO   fexofenadine (ALLEGRA) 180 MG tablet TAKE 1 TABLET BY MOUTH EVERY DAY 10/15/18   Jennifer Alvarez DO   traZODone (DESYREL) 50 MG tablet Take 1 tablet by mouth nightly 18   Jennifer Alvarez DO   gabapentin (NEURONTIN) 800 MG tablet TAKE 1/2 TABLET BY MOUTH 3 TIMES DAILY FOR 30 DAYS. . 18  Jennifer Alvarez DO   torsemide (DEMADEX) 20 MG tablet TAKE 1 TABLET BY MOUTH EVERY DAY 18   Jennifer Alvarez DO   FREESTYLE LANCETS MISC USE AS DIRECTED 4 TIMES A DAY 18   Jennifer Alvarez DO   apixaban (ELIQUIS) 5 MG TABS tablet Take 1 tablet by mouth 2 times daily 18   Jennifer Alvarez DO   insulin lispro (HUMALOG) 100 UNIT/ML injection vial TAKE 90 UNITS WITH BREAKFAST. TAKE 92 UNITS WITH LUNCH.  TAKE 94 UNITS WITH DINNER. 18   Jennifer Alvarez DO   blood glucose test strips (FREESTYLE LITE) strip USE TO TEST BLOOD SUGAR FOUR TIMES DAILY 18   Ingrid Alvarez DO   Insulin Syringe-Needle U-100 (B-D INS SYR ULTRAFINE 1CC/31G) 31G X \" 1 ML MISC USE AS DIRECTED 18   Jose Martin, DO Intravenous 2 times per day Eddie Deshpande MD   10 mL at 11/09/18 0900    sodium chloride flush 0.9 % injection 10 mL  10 mL Intravenous PRN Eddie Deshpande MD        magnesium hydroxide (MILK OF MAGNESIA) 400 MG/5ML suspension 30 mL  30 mL Oral Daily PRN Eddie Deshpande MD        ondansetron [facility]) injection 4 mg  4 mg Intravenous Q6H PRN Eddie Deshpande MD        acetaminophen (TYLENOL) tablet 650 mg  650 mg Oral Q4H PRN Eddie Deshpande MD           Allergies:     Allergies   Allergen Reactions    Statins Other (See Comments)     Muscle pains       Problem List:    Patient Active Problem List   Diagnosis Code    Type 2 diabetes mellitus with diabetic neuropathy, with long-term current use of insulin ([facility]) E11.40, Z79.4    Depression F32.9    Hypertension I10    Hyperlipidemia E78.5    Osteoarthritis M19.90    PAF (paroxysmal atrial fibrillation) ([facility] [address] - currently in SR I48.0    Pulmonary hypertension I27.20    Chronic sinusitis J32.9    Chronic pain G89.29    Steatohepatitis K75.81    Baker's cyst of knee M71.20    Diabetic neuropathy ([facility] [address] E11.40    Iron deficiency E61.1    LVH (left ventricular hypertrophy) I51.7    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    Positive hepatitis C antibody test R76.8    PFO (patent foramen ovale) Q21.1    Chronic diastolic heart failure ([facility]) I50.32    Physical deconditioning R53.81    Lymphedema of both lower extremities I89.0    Chronic anemia D64.9    Dyspnea on exertion R06.09    Chronic deep vein thrombosis (DVT) of distal vein of right lower extremity ([facility]) I82.5Z1    GI bleed K92.2    Palpitations R00.2    Occult blood in stools R19.5    Anxiety and depression F41.9, F32.9    Chronic anticoagulation Z79.01    COPD (chronic obstructive pulmonary disease) ([facility]) J44.9    Chronic respiratory failure with hypoxia ([facility]) J96.11       Past Medical History:        Diagnosis Date    Anal fissure     Anemia 10/6/2017    Anxiety and Mike Dan MD at [facility] ENDOSCOPY       Social History:    Social History   Substance Use Topics    Smoking status: Former Smoker     Packs/day: 1.50     Years: 25.00     Types: Cigarettes     Quit date: 12/18/2004    Smokeless tobacco: Never Used    Alcohol use No      Comment: denies caffeine                                Counseling given: Not Answered      Vital Signs (Current): There were no vitals filed for this visit. BP Readings from Last 3 Encounters:   11/09/18 (!) 140/78   11/02/18 139/71   10/25/18 (!) 140/68       NPO Status:   greater than 8 hours                                                                               BMI:   Wt Readings from Last 3 Encounters:   11/09/18 272 lb 14.4 oz (123.8 kg)   11/02/18 (!) 308 lb (139.7 kg)   10/25/18 289 lb (131.1 kg)     There is no height or weight on file to calculate BMI.    CBC:   Lab Results   Component Value Date    WBC 6.4 11/09/2018    RBC 3.64 11/09/2018    HGB 8.8 11/09/2018    HCT 29.9 11/09/2018    MCV 82.1 11/09/2018    RDW 17.9 11/09/2018     11/09/2018       CMP:   Lab Results   Component Value Date     11/09/2018    K 4.4 11/09/2018    K 4.2 11/08/2018    CL 94 11/09/2018    CO2 31 11/09/2018    BUN 25 11/09/2018    CREATININE 1.1 11/09/2018    GFRAA >60 11/09/2018    LABGLOM 50 11/09/2018    GLUCOSE 125 11/09/2018    GLUCOSE 181 12/16/2011    PROT 6.4 11/06/2018    CALCIUM 9.4 11/09/2018    BILITOT <0.2 11/06/2018    ALKPHOS 61 11/06/2018    AST 12 11/06/2018    ALT 9 11/06/2018       POC Tests: No results for input(s): POCGLU, POCNA, POCK, POCCL, POCBUN, POCHEMO, POCHCT in the last 72 hours.     Coags:   Lab Results   Component Value Date    PROTIME 15.5 11/06/2018    INR 1.3 11/06/2018    APTT 31.6 10/13/2018       HCG (If Applicable): No results found for: PREGTESTUR, PREGSERUM, HCG, HCGQUANT     ABGs: No results found for: PHART, PO2ART, NYJ0IBY, FZY1SXD, BEART, Y0WUMETW Type & Screen (If Applicable):  No results found for: LABABO, [address]      CXR 5/26/18  FINDINGS:    Shallow lung volumes.  Left heart border obscures the left lower lung   zone. No definite focal consolidation identified. No pleural effusion   or pneumothorax. Stable cardiomediastinal silhouette. Stable bones.           Impression   No acute process. No definite focal consolidation identified. Suggest   follow-up PA and lateral x-ray with good inspiratory effort. US DUP Lower Extremities Bilateral Venous 4/13/18  Impression   ALERT:  THIS IS AN ABNORMAL REPORT   1. Bilateral lower extremity subcutaneous edema in the calf regions. The bilateral posterior tibial, peroneal and anterior tibial veins are   not identified and therefore not evaluated. Deep venous thrombosis or   occlusion is not excluded within these venous structures. 2. Limited evaluation of the right lower extremity distal superficial   femoral vein. Partially occlusive deep venous thrombosis is not   excluded within this vessel.    3. Otherwise, no evidence of deep venous thrombosis or occlusion   within the remainder of the above mentioned visualized lower extremity   venous structures     EKG 5/27/18  Component Results     Component Value Ref Range & Units Status Collected Lab   Ventricular Rate 91  BPM Final 05/27/2018  1:46 PM [facility]   Atrial Rate 91  BPM Final 05/27/2018  1:46 PM [facility]   P-R Interval 152  ms Final 05/27/2018  1:46 PM [facility]   QRS Duration 112  ms Final 05/27/2018  1:46 PM [facility]   Q-T Interval 366  ms Final 05/27/2018  1:46 PM [facility]   QTc Calculation (Bazett) 450  ms Final 05/27/2018  1:46 PM [facility]   P [place] 62  degrees Final 05/27/2018  1:46 PM [facility]   R [place] 12  degrees Final 05/27/2018  1:46 PM [facility]   T [place] 32  degrees Final 05/27/2018  1:46 PM [facility]   Testing Performed By     Lab - [address] Name Director Address Valid Date Range   360-[facility] MUSE Unknown Unknown 04/18/16 partials. Pulmonary:   (+) shortness of breath:  decreased breath sounds,  asthma (pt states to use albuterol inhaler 2 X's per day):                           ROS comment: Former smoker, quit 2004  Pt currently on 4L NC, pt states to also wear O2 @ home  Pt states to have had \"cold\" like symptoms for the past several weeks and believes that it is allergies    Probable SAM based on body habitus; Denies having sleep study   Cardiovascular:    (+) hypertension:, CAD:, dysrhythmias (pt states to take aspirin for a fib): atrial fibrillation, CHF:, hyperlipidemia      ECG reviewed  Rhythm: irregular  Rate: normal  Echocardiogram reviewed         Beta Blocker:  Dose within 24 Hrs      ROS comment: LVH (left ventricular hypertrophy)  Pulmonary hypertension  PFO (patent foramen ovale)  Ejection fraction is visually estimated at 55-65%. Neuro/Psych:   (+) depression/anxiety              ROS comment: Occipital neuralgia  diabetic neuropathy GI/Hepatic/Renal:   (+) GERD:, hepatitis: C, liver disease:, renal disease: ARF, morbid obesity         ROS comment: Steatohepatitis. Endo/Other:    (+) DiabetesType II DM, using insulin, blood dyscrasia: anemia, arthritis: OA., electrolyte abnormalities (hyperkalemia (K 4.3 on AM labs)), . ROS comment: Lymphadenitis, chronic  Lymphedema of both lower extremities  Chronic pain Abdominal:   (+) obese,         Vascular:                                          Anesthesia Plan      MAC     ASA 4     (  )  Induction: intravenous. Anesthetic plan and risks discussed with patient. Plan discussed with CRNA.             Som Dennis,    11/9/2018

## 2018-11-09 NOTE — PROGRESS NOTES
Called [phone] and spoke to Vy regarding scheduling Ms. Springer EGD, she should be going down sometime this afternoon.

## 2018-11-09 NOTE — PLAN OF CARE
Problem: Falls - Risk of:  Goal: Will remain free from falls  Will remain free from falls   Outcome: Met This Shift      Problem: Pain Control  Goal: Maintain pain level at or below patient's acceptable level (or 5 if patient is unable to determine acceptable level)  Outcome: Met This Shift      Problem: Respiratory  Goal: O2 Sat > 90%  Outcome: Met This Shift      Problem: Pain:  Goal: Control of acute pain  Control of acute pain   Outcome: Met This Shift

## 2018-11-09 NOTE — PROGRESS NOTES
Appearance: alert and oriented to person, place and time, morbidly obese, in no acute distress  Skin: warm and dry, no rash or erythema  Head: normocephalic and atraumatic  Eyes: pupils equal, round, and reactive to light, extraocular eye movements intact, conjunctivae normal  Neck: supple and non-tender without mass, no thyromegaly   Pulmonary/Chest:decreased BS at both base otherwise CTA  Cardiovascular: normal rate, regular rhythm, normal S1 and S2, + systolic murmur  Abdomen: soft, non-tender, non-distended, normal bowel sounds, no masses or organomegaly  Extremities: no cyanosis, clubbing ,with bipedal edema ,legs wrapped with dressing /ace sec to chronic wounds  Musculoskeletal: normal range of motion, no joint swelling, deformity or tenderness  Neurologic: reflexes normal and symmetric, no cranial nerve deficit,  speech normal      Recent Labs      11/06/18   1519  11/07/18   0620  11/08/18   0455   NA  138  140  139   K  4.3  4.2  4.2   CL  95*  95*  95*   CO2  35*  36*  34*   BUN  28*  21  23   CREATININE  1.0  0.9  1.1*   GLUCOSE  378*  177*  173*   CALCIUM  8.8  9.2  9.2       Recent Labs      11/06/18   1519  11/07/18   0516  11/08/18   0455   WBC  5.1  5.7  5.0   RBC  3.35*  3.65  3.49*   HGB  8.0*  8.6*  8.3*   HCT  27.6*  29.9*  29.0*   MCV  82.4  81.9  83.1   MCH  23.9*  23.6*  23.8*   MCHC  29.0*  28.8*  28.6*   RDW  17.3*  17.4*  17.5*   PLT  261  307  284   MPV  9.1  9.3  9.4       Labs and images reviewed     Radiology:   XR CHEST PORTABLE   Final Result   No acute cardiopulmonary process.           Assessment:    Principal Problem:    Palpitations  Active Problems:    Occult blood in stools    Anxiety and depression    Chronic anticoagulation    Type 2 diabetes mellitus with diabetic neuropathy, with long-term current use of insulin (HCC)    Hypertension    Hyperlipidemia    PAF (paroxysmal atrial fibrillation) ([facility] [address] - currently in SR    Pulmonary hypertension    Chronic diastolic heart failure

## 2018-11-09 NOTE — PROGRESS NOTES
Spoke with Dr. Evelyn Lopez regarding transfer to Med-Surg-hold transfer, keep on intermediate.   Electronically signed by Bao Gonzalez RN on 11/9/2018 at 10:01 AM

## 2018-11-10 LAB
CLOTEST: NORMAL
HCT VFR BLD CALC: 29.2 % (ref 34–48)
HEMOGLOBIN: 8.3 G/DL (ref 11.5–15.5)
MCH RBC QN AUTO: 23.2 PG (ref 26–35)
MCHC RBC AUTO-ENTMCNC: 28.4 % (ref 32–34.5)
MCV RBC AUTO: 81.8 FL (ref 80–99.9)
METER GLUCOSE: 145 MG/DL (ref 74–99)
METER GLUCOSE: 246 MG/DL (ref 74–99)
METER GLUCOSE: 272 MG/DL (ref 74–99)
METER GLUCOSE: 61 MG/DL (ref 74–99)
METER GLUCOSE: 97 MG/DL (ref 74–99)
PDW BLD-RTO: 17.6 FL (ref 11.5–15)
PLATELET # BLD: 285 E9/L (ref 130–450)
PMV BLD AUTO: 9 FL (ref 7–12)
RBC # BLD: 3.57 E12/L (ref 3.5–5.5)
WBC # BLD: 7.1 E9/L (ref 4.5–11.5)

## 2018-11-10 PROCEDURE — 82962 GLUCOSE BLOOD TEST: CPT

## 2018-11-10 PROCEDURE — 99232 SBSQ HOSP IP/OBS MODERATE 35: CPT | Performed by: INTERNAL MEDICINE

## 2018-11-10 PROCEDURE — 6370000000 HC RX 637 (ALT 250 FOR IP): Performed by: INTERNAL MEDICINE

## 2018-11-10 PROCEDURE — 94640 AIRWAY INHALATION TREATMENT: CPT

## 2018-11-10 PROCEDURE — 2060000000 HC ICU INTERMEDIATE R&B

## 2018-11-10 PROCEDURE — 2580000003 HC RX 258: Performed by: INTERNAL MEDICINE

## 2018-11-10 PROCEDURE — 36415 COLL VENOUS BLD VENIPUNCTURE: CPT

## 2018-11-10 PROCEDURE — 85027 COMPLETE CBC AUTOMATED: CPT

## 2018-11-10 RX ORDER — NICOTINE POLACRILEX 4 MG
15 LOZENGE BUCCAL PRN
Status: DISCONTINUED | OUTPATIENT
Start: 2018-11-10 | End: 2018-11-13 | Stop reason: HOSPADM

## 2018-11-10 RX ORDER — DEXTROSE MONOHYDRATE 50 MG/ML
100 INJECTION, SOLUTION INTRAVENOUS PRN
Status: DISCONTINUED | OUTPATIENT
Start: 2018-11-10 | End: 2018-11-13 | Stop reason: HOSPADM

## 2018-11-10 RX ORDER — DEXTROSE MONOHYDRATE 25 G/50ML
12.5 INJECTION, SOLUTION INTRAVENOUS PRN
Status: DISCONTINUED | OUTPATIENT
Start: 2018-11-10 | End: 2018-11-13 | Stop reason: HOSPADM

## 2018-11-10 RX ADMIN — INSULIN LISPRO 50 UNITS: 100 INJECTION, SOLUTION INTRAVENOUS; SUBCUTANEOUS at 17:20

## 2018-11-10 RX ADMIN — MONTELUKAST SODIUM 10 MG: 10 TABLET, FILM COATED ORAL at 20:18

## 2018-11-10 RX ADMIN — IPRATROPIUM BROMIDE AND ALBUTEROL SULFATE 3 ML: .5; 3 SOLUTION RESPIRATORY (INHALATION) at 16:14

## 2018-11-10 RX ADMIN — DILTIAZEM HYDROCHLORIDE 240 MG: 240 CAPSULE, COATED, EXTENDED RELEASE ORAL at 09:51

## 2018-11-10 RX ADMIN — DOCUSATE SODIUM 100 MG: 100 CAPSULE, LIQUID FILLED ORAL at 09:51

## 2018-11-10 RX ADMIN — PETROLATUM: 42 OINTMENT TOPICAL at 09:53

## 2018-11-10 RX ADMIN — IPRATROPIUM BROMIDE AND ALBUTEROL SULFATE 3 ML: .5; 3 SOLUTION RESPIRATORY (INHALATION) at 12:08

## 2018-11-10 RX ADMIN — Medication 10 ML: at 20:18

## 2018-11-10 RX ADMIN — METOPROLOL TARTRATE 100 MG: 50 TABLET ORAL at 09:50

## 2018-11-10 RX ADMIN — APIXABAN 5 MG: 5 TABLET, FILM COATED ORAL at 09:51

## 2018-11-10 RX ADMIN — IPRATROPIUM BROMIDE AND ALBUTEROL SULFATE 3 ML: .5; 3 SOLUTION RESPIRATORY (INHALATION) at 03:03

## 2018-11-10 RX ADMIN — IPRATROPIUM BROMIDE AND ALBUTEROL SULFATE 3 ML: .5; 3 SOLUTION RESPIRATORY (INHALATION) at 09:44

## 2018-11-10 RX ADMIN — INSULIN LISPRO 6 UNITS: 100 INJECTION, SOLUTION INTRAVENOUS; SUBCUTANEOUS at 17:20

## 2018-11-10 RX ADMIN — NYSTATIN: 100000 CREAM TOPICAL at 09:52

## 2018-11-10 RX ADMIN — INSULIN GLARGINE 100 UNITS: 100 INJECTION, SOLUTION SUBCUTANEOUS at 20:14

## 2018-11-10 RX ADMIN — TRAZODONE HYDROCHLORIDE 50 MG: 50 TABLET ORAL at 20:18

## 2018-11-10 RX ADMIN — Medication 10 ML: at 09:53

## 2018-11-10 RX ADMIN — DOCUSATE SODIUM 100 MG: 100 CAPSULE, LIQUID FILLED ORAL at 20:17

## 2018-11-10 RX ADMIN — GABAPENTIN 400 MG: 400 CAPSULE ORAL at 14:30

## 2018-11-10 RX ADMIN — NYSTATIN: 100000 CREAM TOPICAL at 20:18

## 2018-11-10 RX ADMIN — SULFAMETHOXAZOLE AND TRIMETHOPRIM 1 TABLET: 800; 160 TABLET ORAL at 10:31

## 2018-11-10 RX ADMIN — MOMETASONE FUROATE AND FORMOTEROL FUMARATE DIHYDRATE 2 PUFF: 200; 5 AEROSOL RESPIRATORY (INHALATION) at 09:44

## 2018-11-10 RX ADMIN — FERROUS SULFATE TAB 325 MG (65 MG ELEMENTAL FE) 325 MG: 325 (65 FE) TAB at 08:06

## 2018-11-10 RX ADMIN — IPRATROPIUM BROMIDE AND ALBUTEROL SULFATE 3 ML: .5; 3 SOLUTION RESPIRATORY (INHALATION) at 21:01

## 2018-11-10 RX ADMIN — METOPROLOL TARTRATE 100 MG: 50 TABLET ORAL at 20:17

## 2018-11-10 RX ADMIN — FENOFIBRATE 160 MG: 160 TABLET ORAL at 11:13

## 2018-11-10 RX ADMIN — OXYCODONE AND ACETAMINOPHEN 1 TABLET: 10; 325 TABLET ORAL at 20:50

## 2018-11-10 RX ADMIN — OXYCODONE AND ACETAMINOPHEN 1 TABLET: 10; 325 TABLET ORAL at 08:06

## 2018-11-10 RX ADMIN — OXYCODONE AND ACETAMINOPHEN 1 TABLET: 10; 325 TABLET ORAL at 14:36

## 2018-11-10 RX ADMIN — ESCITALOPRAM OXALATE 20 MG: 10 TABLET ORAL at 09:50

## 2018-11-10 RX ADMIN — PANTOPRAZOLE SODIUM 40 MG: 40 TABLET, DELAYED RELEASE ORAL at 06:54

## 2018-11-10 RX ADMIN — FLUTICASONE PROPIONATE 2 SPRAY: 50 SPRAY, METERED NASAL at 09:52

## 2018-11-10 RX ADMIN — DOCUSATE SODIUM 100 MG: 100 CAPSULE, LIQUID FILLED ORAL at 14:30

## 2018-11-10 RX ADMIN — SPIRONOLACTONE 25 MG: 25 TABLET ORAL at 09:51

## 2018-11-10 RX ADMIN — TORSEMIDE 20 MG: 20 TABLET ORAL at 09:50

## 2018-11-10 RX ADMIN — INSULIN LISPRO 90 UNITS: 100 INJECTION, SOLUTION INTRAVENOUS; SUBCUTANEOUS at 06:54

## 2018-11-10 RX ADMIN — OXYCODONE AND ACETAMINOPHEN 1 TABLET: 10; 325 TABLET ORAL at 00:01

## 2018-11-10 RX ADMIN — GABAPENTIN 400 MG: 400 CAPSULE ORAL at 09:51

## 2018-11-10 RX ADMIN — APIXABAN 5 MG: 5 TABLET, FILM COATED ORAL at 20:18

## 2018-11-10 RX ADMIN — MOMETASONE FUROATE AND FORMOTEROL FUMARATE DIHYDRATE 2 PUFF: 200; 5 AEROSOL RESPIRATORY (INHALATION) at 21:01

## 2018-11-10 RX ADMIN — CETIRIZINE HYDROCHLORIDE 10 MG: 10 TABLET, FILM COATED ORAL at 09:51

## 2018-11-10 RX ADMIN — GABAPENTIN 400 MG: 400 CAPSULE ORAL at 20:18

## 2018-11-10 RX ADMIN — INSULIN LISPRO 5 UNITS: 100 INJECTION, SOLUTION INTRAVENOUS; SUBCUTANEOUS at 20:15

## 2018-11-10 RX ADMIN — SULFAMETHOXAZOLE AND TRIMETHOPRIM 1 TABLET: 800; 160 TABLET ORAL at 20:17

## 2018-11-10 ASSESSMENT — PAIN SCALES - GENERAL
PAINLEVEL_OUTOF10: 6
PAINLEVEL_OUTOF10: 5
PAINLEVEL_OUTOF10: 0
PAINLEVEL_OUTOF10: 3
PAINLEVEL_OUTOF10: 1
PAINLEVEL_OUTOF10: 5
PAINLEVEL_OUTOF10: 1
PAINLEVEL_OUTOF10: 5

## 2018-11-10 ASSESSMENT — PAIN DESCRIPTION - ORIENTATION
ORIENTATION: RIGHT;LEFT
ORIENTATION: RIGHT
ORIENTATION: RIGHT;LEFT

## 2018-11-10 ASSESSMENT — PAIN DESCRIPTION - LOCATION
LOCATION: LEG

## 2018-11-10 ASSESSMENT — PAIN DESCRIPTION - ONSET
ONSET: ON-GOING
ONSET: ON-GOING

## 2018-11-10 ASSESSMENT — PAIN DESCRIPTION - PROGRESSION
CLINICAL_PROGRESSION: NOT CHANGED
CLINICAL_PROGRESSION: NOT CHANGED

## 2018-11-10 ASSESSMENT — PAIN DESCRIPTION - FREQUENCY
FREQUENCY: INTERMITTENT
FREQUENCY: INTERMITTENT

## 2018-11-10 ASSESSMENT — PAIN DESCRIPTION - PAIN TYPE
TYPE: CHRONIC PAIN

## 2018-11-10 ASSESSMENT — PAIN DESCRIPTION - DESCRIPTORS
DESCRIPTORS: ACHING;DISCOMFORT;THROBBING
DESCRIPTORS: BURNING;ACHING;DISCOMFORT
DESCRIPTORS: ACHING;BURNING;DISCOMFORT

## 2018-11-10 NOTE — PROGRESS NOTES
Palpitations  Active Problems:    Type 2 diabetes mellitus with diabetic neuropathy, with long-term current use of insulin ([facility])    Hypertension    Hyperlipidemia    PAF (paroxysmal atrial fibrillation) ([facility]) - currently in SR    Pulmonary hypertension    Chronic diastolic heart failure ([facility])    Chronic anemia    Dyspnea on exertion    Occult blood in stools    Anxiety and depression    Chronic anticoagulation    COPD (chronic obstructive pulmonary disease) ([facility])    Chronic respiratory failure with hypoxia ([facility])  Resolved Problems:    * No resolved hospital problems. *  .    Plan:  1. Dyspnea on exertion. Multifactorial, due to CHF, COPD and chronic respiratory failure, as well as her weight. She appears to be almost at her baseline. 2. GI bleed. Status post EGD yesterday with findings of gastritis and esophageal reflux. Hemoglobin from today pending. Continue PPI. Plan for colonoscopy Monday. GI following, appreciate input. 3. Acute on chronic anemia. Blood loss anemia. As above. 4. Acute on Chronic diastolic heart failure. Volume status somewhat difficult to assess given her chronic lymphedema though appears relatively euvolemic. Continue current dose of torsemide. 5.  COPD and chronic heart failure. On home O2. Continue respiratory medications. 6. Hypertension. Reasonably well-controlled. Continue diltiazem, metoprolol, spironolactone in home dose of torsemide  7. Paroxysmal atrial fibrillation. Remains in sinus rhythm, metoprolol and diltiazem. Steff Arenas Anticoagulated with apixaban  8. type II diabetes on insulin. On large doses of home insulin. Had an episode of hypoglycemia, monitor closely. Continue present doses for now. 9. Anxiety and depression. Continue home meds  10. Chronic lymphedema and leg wounds. Legs wrapped. Wound care. 11. Hyperlipidemia. Continue statin.   12. Will clarify why she is Bactrim at home    Gave patient the option to be discharged home and come back for outpatient colonoscopy as this

## 2018-11-10 NOTE — PROGRESS NOTES
Notified Dr. Faustina Nicole of patient's earlier blood sugar of 61 and repeat of 97 after orange juice with Humalog insulin being held at that time. Notified that blood sugar now 246 and concerned about giving patient 94 Units at this time. New orders received.

## 2018-11-11 LAB
HBA1C MFR BLD: 8 % (ref 4–5.6)
HCT VFR BLD CALC: 27.4 % (ref 34–48)
HEMOGLOBIN: 8 G/DL (ref 11.5–15.5)
MCH RBC QN AUTO: 24 PG (ref 26–35)
MCHC RBC AUTO-ENTMCNC: 29.2 % (ref 32–34.5)
MCV RBC AUTO: 82.3 FL (ref 80–99.9)
METER GLUCOSE: 126 MG/DL (ref 74–99)
METER GLUCOSE: 180 MG/DL (ref 74–99)
METER GLUCOSE: 232 MG/DL (ref 74–99)
METER GLUCOSE: 253 MG/DL (ref 74–99)
METER GLUCOSE: 312 MG/DL (ref 74–99)
PDW BLD-RTO: 17.2 FL (ref 11.5–15)
PLATELET # BLD: 291 E9/L (ref 130–450)
PMV BLD AUTO: 9.6 FL (ref 7–12)
RBC # BLD: 3.33 E12/L (ref 3.5–5.5)
WBC # BLD: 7.2 E9/L (ref 4.5–11.5)

## 2018-11-11 PROCEDURE — 82962 GLUCOSE BLOOD TEST: CPT

## 2018-11-11 PROCEDURE — 6370000000 HC RX 637 (ALT 250 FOR IP): Performed by: INTERNAL MEDICINE

## 2018-11-11 PROCEDURE — 83036 HEMOGLOBIN GLYCOSYLATED A1C: CPT

## 2018-11-11 PROCEDURE — 94640 AIRWAY INHALATION TREATMENT: CPT

## 2018-11-11 PROCEDURE — 36415 COLL VENOUS BLD VENIPUNCTURE: CPT

## 2018-11-11 PROCEDURE — 2060000000 HC ICU INTERMEDIATE R&B

## 2018-11-11 PROCEDURE — 99232 SBSQ HOSP IP/OBS MODERATE 35: CPT | Performed by: INTERNAL MEDICINE

## 2018-11-11 PROCEDURE — 85027 COMPLETE CBC AUTOMATED: CPT

## 2018-11-11 PROCEDURE — 2700000000 HC OXYGEN THERAPY PER DAY

## 2018-11-11 PROCEDURE — 2580000003 HC RX 258: Performed by: INTERNAL MEDICINE

## 2018-11-11 RX ADMIN — IPRATROPIUM BROMIDE AND ALBUTEROL SULFATE 3 ML: .5; 3 SOLUTION RESPIRATORY (INHALATION) at 04:42

## 2018-11-11 RX ADMIN — APIXABAN 5 MG: 5 TABLET, FILM COATED ORAL at 09:00

## 2018-11-11 RX ADMIN — TORSEMIDE 20 MG: 20 TABLET ORAL at 09:00

## 2018-11-11 RX ADMIN — MOMETASONE FUROATE AND FORMOTEROL FUMARATE DIHYDRATE 2 PUFF: 200; 5 AEROSOL RESPIRATORY (INHALATION) at 08:34

## 2018-11-11 RX ADMIN — FENOFIBRATE 160 MG: 160 TABLET ORAL at 09:00

## 2018-11-11 RX ADMIN — APIXABAN 5 MG: 5 TABLET, FILM COATED ORAL at 20:05

## 2018-11-11 RX ADMIN — FERROUS SULFATE TAB 325 MG (65 MG ELEMENTAL FE) 325 MG: 325 (65 FE) TAB at 08:59

## 2018-11-11 RX ADMIN — IPRATROPIUM BROMIDE AND ALBUTEROL SULFATE 3 ML: .5; 3 SOLUTION RESPIRATORY (INHALATION) at 20:30

## 2018-11-11 RX ADMIN — SULFAMETHOXAZOLE AND TRIMETHOPRIM 1 TABLET: 800; 160 TABLET ORAL at 20:05

## 2018-11-11 RX ADMIN — DILTIAZEM HYDROCHLORIDE 240 MG: 240 CAPSULE, COATED, EXTENDED RELEASE ORAL at 09:00

## 2018-11-11 RX ADMIN — ESCITALOPRAM OXALATE 20 MG: 10 TABLET ORAL at 08:59

## 2018-11-11 RX ADMIN — GABAPENTIN 400 MG: 400 CAPSULE ORAL at 08:59

## 2018-11-11 RX ADMIN — SULFAMETHOXAZOLE AND TRIMETHOPRIM 1 TABLET: 800; 160 TABLET ORAL at 09:00

## 2018-11-11 RX ADMIN — INSULIN LISPRO 3 UNITS: 100 INJECTION, SOLUTION INTRAVENOUS; SUBCUTANEOUS at 16:15

## 2018-11-11 RX ADMIN — PANTOPRAZOLE SODIUM 40 MG: 40 TABLET, DELAYED RELEASE ORAL at 06:21

## 2018-11-11 RX ADMIN — INSULIN LISPRO 50 UNITS: 100 INJECTION, SOLUTION INTRAVENOUS; SUBCUTANEOUS at 09:19

## 2018-11-11 RX ADMIN — Medication 10 ML: at 20:06

## 2018-11-11 RX ADMIN — INSULIN LISPRO 6 UNITS: 100 INJECTION, SOLUTION INTRAVENOUS; SUBCUTANEOUS at 11:21

## 2018-11-11 RX ADMIN — IPRATROPIUM BROMIDE AND ALBUTEROL SULFATE 3 ML: .5; 3 SOLUTION RESPIRATORY (INHALATION) at 12:33

## 2018-11-11 RX ADMIN — GABAPENTIN 400 MG: 400 CAPSULE ORAL at 13:33

## 2018-11-11 RX ADMIN — SPIRONOLACTONE 25 MG: 25 TABLET ORAL at 09:00

## 2018-11-11 RX ADMIN — IPRATROPIUM BROMIDE AND ALBUTEROL SULFATE 3 ML: .5; 3 SOLUTION RESPIRATORY (INHALATION) at 08:34

## 2018-11-11 RX ADMIN — CETIRIZINE HYDROCHLORIDE 10 MG: 10 TABLET, FILM COATED ORAL at 09:00

## 2018-11-11 RX ADMIN — GABAPENTIN 400 MG: 400 CAPSULE ORAL at 20:05

## 2018-11-11 RX ADMIN — IPRATROPIUM BROMIDE AND ALBUTEROL SULFATE 3 ML: .5; 3 SOLUTION RESPIRATORY (INHALATION) at 00:36

## 2018-11-11 RX ADMIN — Medication 10 ML: at 09:02

## 2018-11-11 RX ADMIN — PETROLATUM: 42 OINTMENT TOPICAL at 09:01

## 2018-11-11 RX ADMIN — DOCUSATE SODIUM 100 MG: 100 CAPSULE, LIQUID FILLED ORAL at 09:00

## 2018-11-11 RX ADMIN — MONTELUKAST SODIUM 10 MG: 10 TABLET, FILM COATED ORAL at 20:05

## 2018-11-11 RX ADMIN — TRAZODONE HYDROCHLORIDE 50 MG: 50 TABLET ORAL at 20:13

## 2018-11-11 RX ADMIN — NYSTATIN: 100000 CREAM TOPICAL at 20:05

## 2018-11-11 RX ADMIN — INSULIN LISPRO 50 UNITS: 100 INJECTION, SOLUTION INTRAVENOUS; SUBCUTANEOUS at 11:25

## 2018-11-11 RX ADMIN — IPRATROPIUM BROMIDE AND ALBUTEROL SULFATE 3 ML: .5; 3 SOLUTION RESPIRATORY (INHALATION) at 15:53

## 2018-11-11 RX ADMIN — OXYCODONE AND ACETAMINOPHEN 1 TABLET: 10; 325 TABLET ORAL at 03:13

## 2018-11-11 RX ADMIN — INSULIN LISPRO 6 UNITS: 100 INJECTION, SOLUTION INTRAVENOUS; SUBCUTANEOUS at 20:02

## 2018-11-11 RX ADMIN — DOCUSATE SODIUM 100 MG: 100 CAPSULE, LIQUID FILLED ORAL at 20:05

## 2018-11-11 RX ADMIN — INSULIN GLARGINE 100 UNITS: 100 INJECTION, SOLUTION SUBCUTANEOUS at 20:03

## 2018-11-11 RX ADMIN — METOPROLOL TARTRATE 100 MG: 50 TABLET ORAL at 09:00

## 2018-11-11 RX ADMIN — INSULIN LISPRO 50 UNITS: 100 INJECTION, SOLUTION INTRAVENOUS; SUBCUTANEOUS at 16:17

## 2018-11-11 RX ADMIN — NYSTATIN: 100000 CREAM TOPICAL at 09:00

## 2018-11-11 RX ADMIN — FLUTICASONE PROPIONATE 2 SPRAY: 50 SPRAY, METERED NASAL at 08:59

## 2018-11-11 RX ADMIN — OXYCODONE AND ACETAMINOPHEN 1 TABLET: 10; 325 TABLET ORAL at 20:05

## 2018-11-11 RX ADMIN — OXYCODONE AND ACETAMINOPHEN 1 TABLET: 10; 325 TABLET ORAL at 09:07

## 2018-11-11 RX ADMIN — METOPROLOL TARTRATE 100 MG: 50 TABLET ORAL at 20:05

## 2018-11-11 RX ADMIN — MOMETASONE FUROATE AND FORMOTEROL FUMARATE DIHYDRATE 2 PUFF: 200; 5 AEROSOL RESPIRATORY (INHALATION) at 20:30

## 2018-11-11 RX ADMIN — DOCUSATE SODIUM 100 MG: 100 CAPSULE, LIQUID FILLED ORAL at 13:33

## 2018-11-11 RX ADMIN — OXYCODONE AND ACETAMINOPHEN 1 TABLET: 10; 325 TABLET ORAL at 13:33

## 2018-11-11 ASSESSMENT — PAIN SCALES - GENERAL
PAINLEVEL_OUTOF10: 7
PAINLEVEL_OUTOF10: 0
PAINLEVEL_OUTOF10: 7
PAINLEVEL_OUTOF10: 7
PAINLEVEL_OUTOF10: 0
PAINLEVEL_OUTOF10: 0
PAINLEVEL_OUTOF10: 7

## 2018-11-11 ASSESSMENT — PAIN DESCRIPTION - LOCATION
LOCATION: LEG
LOCATION: LEG

## 2018-11-11 ASSESSMENT — PAIN DESCRIPTION - PAIN TYPE
TYPE: CHRONIC PAIN
TYPE: CHRONIC PAIN

## 2018-11-11 ASSESSMENT — PAIN DESCRIPTION - DESCRIPTORS
DESCRIPTORS: ACHING;DISCOMFORT;THROBBING
DESCRIPTORS: ACHING;DISCOMFORT;THROBBING

## 2018-11-11 ASSESSMENT — PAIN DESCRIPTION - ORIENTATION
ORIENTATION: RIGHT
ORIENTATION: RIGHT

## 2018-11-11 NOTE — PROGRESS NOTES
meds  10. Chronic lymphedema and leg wounds. Legs wrapped. Wound care. she is on Bactrim for this was a home med  6. Hyperlipidemia. Intolerant to statins. On fenofibrate.     Full code    DVT prophylaxis with apixaban        Electronically signed by Pro Segal MD on 11/11/2018 at 11:13 AM

## 2018-11-12 ENCOUNTER — APPOINTMENT (OUTPATIENT)
Dept: GENERAL RADIOLOGY | Age: 63
DRG: 253 | End: 2018-11-12
Payer: COMMERCIAL

## 2018-11-12 LAB
ABO/RH: NORMAL
ANION GAP SERPL CALCULATED.3IONS-SCNC: 10 MMOL/L (ref 7–16)
ANTIBODY SCREEN: NORMAL
BLOOD BANK DISPENSE STATUS: NORMAL
BLOOD BANK PRODUCT CODE: NORMAL
BPU ID: NORMAL
BUN BLDV-MCNC: 28 MG/DL (ref 8–23)
CALCIUM SERPL-MCNC: 8.9 MG/DL (ref 8.6–10.2)
CHLORIDE BLD-SCNC: 95 MMOL/L (ref 98–107)
CO2: 32 MMOL/L (ref 22–29)
CREAT SERPL-MCNC: 1 MG/DL (ref 0.5–1)
DESCRIPTION BLOOD BANK: NORMAL
GFR AFRICAN AMERICAN: >60
GFR NON-AFRICAN AMERICAN: 56 ML/MIN/1.73
GLUCOSE BLD-MCNC: 321 MG/DL (ref 74–99)
HCT VFR BLD CALC: 26.6 % (ref 34–48)
HEMOGLOBIN: 7.7 G/DL (ref 11.5–15.5)
MCH RBC QN AUTO: 23.8 PG (ref 26–35)
MCHC RBC AUTO-ENTMCNC: 28.9 % (ref 32–34.5)
MCV RBC AUTO: 82.1 FL (ref 80–99.9)
METER GLUCOSE: 259 MG/DL (ref 74–99)
METER GLUCOSE: 271 MG/DL (ref 74–99)
METER GLUCOSE: 287 MG/DL (ref 74–99)
METER GLUCOSE: 297 MG/DL (ref 74–99)
PDW BLD-RTO: 17.2 FL (ref 11.5–15)
PLATELET # BLD: 289 E9/L (ref 130–450)
PMV BLD AUTO: 9.5 FL (ref 7–12)
POTASSIUM SERPL-SCNC: 4.7 MMOL/L (ref 3.5–5)
RBC # BLD: 3.24 E12/L (ref 3.5–5.5)
SODIUM BLD-SCNC: 137 MMOL/L (ref 132–146)
WBC # BLD: 6.2 E9/L (ref 4.5–11.5)

## 2018-11-12 PROCEDURE — 6370000000 HC RX 637 (ALT 250 FOR IP): Performed by: INTERNAL MEDICINE

## 2018-11-12 PROCEDURE — 94640 AIRWAY INHALATION TREATMENT: CPT

## 2018-11-12 PROCEDURE — 80048 BASIC METABOLIC PNL TOTAL CA: CPT

## 2018-11-12 PROCEDURE — 2580000003 HC RX 258: Performed by: NURSE PRACTITIONER

## 2018-11-12 PROCEDURE — G8979 MOBILITY GOAL STATUS: HCPCS

## 2018-11-12 PROCEDURE — G8987 SELF CARE CURRENT STATUS: HCPCS

## 2018-11-12 PROCEDURE — 86901 BLOOD TYPING SEROLOGIC RH(D): CPT

## 2018-11-12 PROCEDURE — G8988 SELF CARE GOAL STATUS: HCPCS

## 2018-11-12 PROCEDURE — 97161 PT EVAL LOW COMPLEX 20 MIN: CPT

## 2018-11-12 PROCEDURE — 85027 COMPLETE CBC AUTOMATED: CPT

## 2018-11-12 PROCEDURE — 86923 COMPATIBILITY TEST ELECTRIC: CPT

## 2018-11-12 PROCEDURE — 36415 COLL VENOUS BLD VENIPUNCTURE: CPT

## 2018-11-12 PROCEDURE — 86850 RBC ANTIBODY SCREEN: CPT

## 2018-11-12 PROCEDURE — P9016 RBC LEUKOCYTES REDUCED: HCPCS

## 2018-11-12 PROCEDURE — 82962 GLUCOSE BLOOD TEST: CPT

## 2018-11-12 PROCEDURE — 2060000000 HC ICU INTERMEDIATE R&B

## 2018-11-12 PROCEDURE — 86900 BLOOD TYPING SEROLOGIC ABO: CPT

## 2018-11-12 PROCEDURE — 71046 X-RAY EXAM CHEST 2 VIEWS: CPT

## 2018-11-12 PROCEDURE — 2580000003 HC RX 258: Performed by: INTERNAL MEDICINE

## 2018-11-12 PROCEDURE — 97165 OT EVAL LOW COMPLEX 30 MIN: CPT

## 2018-11-12 PROCEDURE — G8978 MOBILITY CURRENT STATUS: HCPCS

## 2018-11-12 PROCEDURE — 2700000000 HC OXYGEN THERAPY PER DAY

## 2018-11-12 PROCEDURE — 36430 TRANSFUSION BLD/BLD COMPNT: CPT

## 2018-11-12 RX ORDER — 0.9 % SODIUM CHLORIDE 0.9 %
250 INTRAVENOUS SOLUTION INTRAVENOUS ONCE
Status: COMPLETED | OUTPATIENT
Start: 2018-11-12 | End: 2018-11-13

## 2018-11-12 RX ADMIN — NYSTATIN: 100000 CREAM TOPICAL at 11:24

## 2018-11-12 RX ADMIN — MONTELUKAST SODIUM 10 MG: 10 TABLET, FILM COATED ORAL at 20:19

## 2018-11-12 RX ADMIN — NYSTATIN: 100000 CREAM TOPICAL at 20:22

## 2018-11-12 RX ADMIN — INSULIN LISPRO 9 UNITS: 100 INJECTION, SOLUTION INTRAVENOUS; SUBCUTANEOUS at 09:44

## 2018-11-12 RX ADMIN — INSULIN LISPRO 5 UNITS: 100 INJECTION, SOLUTION INTRAVENOUS; SUBCUTANEOUS at 20:22

## 2018-11-12 RX ADMIN — INSULIN LISPRO 50 UNITS: 100 INJECTION, SOLUTION INTRAVENOUS; SUBCUTANEOUS at 11:50

## 2018-11-12 RX ADMIN — SODIUM CHLORIDE 250 ML: 9 INJECTION, SOLUTION INTRAVENOUS at 18:54

## 2018-11-12 RX ADMIN — Medication 10 ML: at 20:19

## 2018-11-12 RX ADMIN — SULFAMETHOXAZOLE AND TRIMETHOPRIM 1 TABLET: 800; 160 TABLET ORAL at 21:33

## 2018-11-12 RX ADMIN — IPRATROPIUM BROMIDE AND ALBUTEROL SULFATE 3 ML: .5; 3 SOLUTION RESPIRATORY (INHALATION) at 17:36

## 2018-11-12 RX ADMIN — METOPROLOL TARTRATE 100 MG: 50 TABLET ORAL at 09:39

## 2018-11-12 RX ADMIN — OXYCODONE AND ACETAMINOPHEN 1 TABLET: 10; 325 TABLET ORAL at 13:34

## 2018-11-12 RX ADMIN — SPIRONOLACTONE 25 MG: 25 TABLET ORAL at 09:40

## 2018-11-12 RX ADMIN — INSULIN LISPRO 50 UNITS: 100 INJECTION, SOLUTION INTRAVENOUS; SUBCUTANEOUS at 17:30

## 2018-11-12 RX ADMIN — PANTOPRAZOLE SODIUM 40 MG: 40 TABLET, DELAYED RELEASE ORAL at 06:55

## 2018-11-12 RX ADMIN — INSULIN LISPRO 50 UNITS: 100 INJECTION, SOLUTION INTRAVENOUS; SUBCUTANEOUS at 09:45

## 2018-11-12 RX ADMIN — IPRATROPIUM BROMIDE AND ALBUTEROL SULFATE 3 ML: .5; 3 SOLUTION RESPIRATORY (INHALATION) at 21:49

## 2018-11-12 RX ADMIN — DOCUSATE SODIUM 100 MG: 100 CAPSULE, LIQUID FILLED ORAL at 09:39

## 2018-11-12 RX ADMIN — CETIRIZINE HYDROCHLORIDE 10 MG: 10 TABLET, FILM COATED ORAL at 09:41

## 2018-11-12 RX ADMIN — PETROLATUM: 42 OINTMENT TOPICAL at 09:47

## 2018-11-12 RX ADMIN — IPRATROPIUM BROMIDE AND ALBUTEROL SULFATE 3 ML: .5; 3 SOLUTION RESPIRATORY (INHALATION) at 12:39

## 2018-11-12 RX ADMIN — DOCUSATE SODIUM 100 MG: 100 CAPSULE, LIQUID FILLED ORAL at 15:08

## 2018-11-12 RX ADMIN — APIXABAN 5 MG: 5 TABLET, FILM COATED ORAL at 09:40

## 2018-11-12 RX ADMIN — MOMETASONE FUROATE AND FORMOTEROL FUMARATE DIHYDRATE 2 PUFF: 200; 5 AEROSOL RESPIRATORY (INHALATION) at 09:23

## 2018-11-12 RX ADMIN — GABAPENTIN 400 MG: 400 CAPSULE ORAL at 15:08

## 2018-11-12 RX ADMIN — FLUTICASONE PROPIONATE 2 SPRAY: 50 SPRAY, METERED NASAL at 11:24

## 2018-11-12 RX ADMIN — APIXABAN 5 MG: 5 TABLET, FILM COATED ORAL at 20:19

## 2018-11-12 RX ADMIN — DOCUSATE SODIUM 100 MG: 100 CAPSULE, LIQUID FILLED ORAL at 20:19

## 2018-11-12 RX ADMIN — ESCITALOPRAM OXALATE 20 MG: 10 TABLET ORAL at 09:41

## 2018-11-12 RX ADMIN — OXYCODONE AND ACETAMINOPHEN 1 TABLET: 10; 325 TABLET ORAL at 20:19

## 2018-11-12 RX ADMIN — FERROUS SULFATE TAB 325 MG (65 MG ELEMENTAL FE) 325 MG: 325 (65 FE) TAB at 09:42

## 2018-11-12 RX ADMIN — GABAPENTIN 400 MG: 400 CAPSULE ORAL at 09:40

## 2018-11-12 RX ADMIN — INSULIN LISPRO 9 UNITS: 100 INJECTION, SOLUTION INTRAVENOUS; SUBCUTANEOUS at 11:50

## 2018-11-12 RX ADMIN — GABAPENTIN 400 MG: 400 CAPSULE ORAL at 20:19

## 2018-11-12 RX ADMIN — OXYCODONE AND ACETAMINOPHEN 1 TABLET: 10; 325 TABLET ORAL at 06:57

## 2018-11-12 RX ADMIN — Medication 10 ML: at 09:47

## 2018-11-12 RX ADMIN — IPRATROPIUM BROMIDE AND ALBUTEROL SULFATE 3 ML: .5; 3 SOLUTION RESPIRATORY (INHALATION) at 03:49

## 2018-11-12 RX ADMIN — INSULIN GLARGINE 100 UNITS: 100 INJECTION, SOLUTION SUBCUTANEOUS at 20:22

## 2018-11-12 RX ADMIN — DILTIAZEM HYDROCHLORIDE 240 MG: 240 CAPSULE, COATED, EXTENDED RELEASE ORAL at 09:39

## 2018-11-12 RX ADMIN — IPRATROPIUM BROMIDE AND ALBUTEROL SULFATE 3 ML: .5; 3 SOLUTION RESPIRATORY (INHALATION) at 09:23

## 2018-11-12 RX ADMIN — MOMETASONE FUROATE AND FORMOTEROL FUMARATE DIHYDRATE 2 PUFF: 200; 5 AEROSOL RESPIRATORY (INHALATION) at 21:49

## 2018-11-12 RX ADMIN — SULFAMETHOXAZOLE AND TRIMETHOPRIM 1 TABLET: 800; 160 TABLET ORAL at 09:41

## 2018-11-12 RX ADMIN — METOPROLOL TARTRATE 100 MG: 50 TABLET ORAL at 20:21

## 2018-11-12 RX ADMIN — IPRATROPIUM BROMIDE AND ALBUTEROL SULFATE 3 ML: .5; 3 SOLUTION RESPIRATORY (INHALATION) at 00:03

## 2018-11-12 RX ADMIN — FENOFIBRATE 160 MG: 160 TABLET ORAL at 09:41

## 2018-11-12 RX ADMIN — TORSEMIDE 20 MG: 20 TABLET ORAL at 09:41

## 2018-11-12 RX ADMIN — TRAZODONE HYDROCHLORIDE 50 MG: 50 TABLET ORAL at 20:19

## 2018-11-12 RX ADMIN — INSULIN LISPRO 9 UNITS: 100 INJECTION, SOLUTION INTRAVENOUS; SUBCUTANEOUS at 17:30

## 2018-11-12 ASSESSMENT — PAIN DESCRIPTION - DESCRIPTORS
DESCRIPTORS: ACHING
DESCRIPTORS: THROBBING;ACHING;DISCOMFORT

## 2018-11-12 ASSESSMENT — PAIN SCALES - GENERAL
PAINLEVEL_OUTOF10: 6
PAINLEVEL_OUTOF10: 2
PAINLEVEL_OUTOF10: 6
PAINLEVEL_OUTOF10: 6
PAINLEVEL_OUTOF10: 2
PAINLEVEL_OUTOF10: 3

## 2018-11-12 ASSESSMENT — PAIN DESCRIPTION - ORIENTATION
ORIENTATION: RIGHT;LEFT
ORIENTATION: RIGHT;LEFT

## 2018-11-12 ASSESSMENT — PAIN DESCRIPTION - LOCATION
LOCATION: FOOT
LOCATION: LEG;FOOT

## 2018-11-12 ASSESSMENT — PAIN DESCRIPTION - PAIN TYPE: TYPE: CHRONIC PAIN

## 2018-11-12 ASSESSMENT — PAIN DESCRIPTION - FREQUENCY: FREQUENCY: CONTINUOUS

## 2018-11-12 NOTE — PROGRESS NOTES
[facility] Hospitalist   Progress Note    Admitting Date and Time: 11/6/2018  2:44 PM  Admit Dx: GI bleed [K92.2]  GI bleed [K92.2]    Subjective:    Patient was admitted with GI bleed [K92.2]  GI bleed [K92.2]. Patient feels better, sitting in chair  Per RN: No issues    ROS: denies fever, chills, cp, sob, n/v, HA unless stated above.      sodium chloride  250 mL Intravenous Once    insulin lispro  50 Units Subcutaneous QAM AC    insulin lispro  50 Units Subcutaneous Daily before lunch    insulin lispro  50 Units Subcutaneous Dinner    insulin lispro  0-18 Units Subcutaneous TID WC    insulin lispro  0-9 Units Subcutaneous Nightly    mineral oil-hydrophilic petrolatum   Topical Daily    apixaban  5 mg Oral BID    diltiazem  240 mg Oral Daily    docusate sodium  100 mg Oral TID    mometasone-formoterol  2 puff Inhalation BID    escitalopram  20 mg Oral Daily    fenofibrate  160 mg Oral Daily    ferrous sulfate  325 mg Oral Daily with breakfast    cetirizine  10 mg Oral Daily    fluticasone  2 spray Each Nare Daily    gabapentin  400 mg Oral TID    insulin glargine  100 Units Subcutaneous Nightly    ipratropium-albuterol  1 vial Inhalation Q4H    pantoprazole  40 mg Oral QAM AC    metoprolol  100 mg Oral BID    montelukast  10 mg Oral Nightly    nystatin   Topical BID    spironolactone  25 mg Oral Daily    sulfamethoxazole-trimethoprim  1 tablet Oral BID    torsemide  20 mg Oral Daily    traZODone  50 mg Oral Nightly    sodium chloride flush  10 mL Intravenous 2 times per day       glucose 15 g PRN   dextrose 12.5 g PRN   glucagon (rDNA) 1 mg PRN   dextrose 100 mL/hr PRN   albuterol sulfate HFA 2 puff Q4H PRN   ALPRAZolam 0.25 mg Nightly PRN   cyclobenzaprine 10 mg TID PRN   oxyCODONE-acetaminophen 1 tablet Q4H PRN   sodium chloride flush 10 mL PRN   magnesium hydroxide 30 mL Daily PRN   ondansetron 4 mg Q6H PRN   acetaminophen 650 mg Q4H PRN        Objective:    BP (!) 112/58   Pulse 104   Temp 98.1 °F (36.7 °C) (Oral)   Resp 16   Ht 5' 3\" (1.6 m)   Wt 298 lb 9.6 oz (135.4 kg)   LMP  (LMP Unknown)   SpO2 93%   Breastfeeding? No   BMI 52.89 kg/m²   General Appearance: alert and oriented to person, place and time, well-developed and well-nourished, in no acute distress  Skin: warm and dry, no rash or erythema  Head: normocephalic and atraumatic  Eyes: pupils equal, round, and reactive to light, extraocular eye movements intact, conjunctivae normal  ENT: tympanic membrane, external ear and ear canal normal bilaterally, oropharynx clear and moist with normal mucous membranes  Neck: neck supple and non tender without mass, no thyromegaly or thyroid nodules, no cervical lymphadenopathy   Pulmonary/Chest: clear to auscultation bilaterally- no wheezes, rales or rhonchi, normal air movement, no respiratory distress  Cardiovascular: normal rate, normal S1 and S2, no gallops, intact distal pulses and no carotid bruits  Abdomen: soft, non-tender, non-distended, normal bowel sounds, no masses or organomegaly      Recent Labs      11/12/18   0805   NA  137   K  4.7   CL  95*   CO2  32*   BUN  28*   CREATININE  1.0   GLUCOSE  321*   CALCIUM  8.9       Recent Labs      11/10/18   1237  11/11/18   0856  11/12/18   0805   WBC  7.1  7.2  6.2   RBC  3.57  3.33*  3.24*   HGB  8.3*  8.0*  7.7*   HCT  29.2*  27.4*  26.6*   MCV  81.8  82.3  82.1   MCH  23.2*  24.0*  23.8*   MCHC  28.4*  29.2*  28.9*   RDW  17.6*  17.2*  17.2*   PLT  285  291  289   MPV  9.0  9.6  9.5           Radiology:   XR CHEST STANDARD (2 VW)   Final Result   NO ACUTE CARDIOPULMONARY PROCESS          XR CHEST PORTABLE   Final Result   No acute cardiopulmonary process.           Assessment:    Principal Problem:    Palpitations  Active Problems:    Type 2 diabetes mellitus with diabetic neuropathy, with long-term current use of insulin ([facility])    Hypertension    Hyperlipidemia    PAF (paroxysmal atrial fibrillation) ([facility] [address] -

## 2018-11-12 NOTE — PROGRESS NOTES
AROM moderately limited    LUE: distal: WFL, shoulder flexion AROM limited   Strength: RUE: distal 3+/5 LUE: distal 3+/5   Strength: B WFL  Fine Motor Coordination:  WFL    Hearing: WFL  Sensation:   C/o occasional numbness/tingling B hands   Tone:  WFL  Edema: B LE's                            Comments/Treatment: Upon arrival, patient seated in chair. Pt required increased assistance for self-care tasks d/t B UE weakness, limited activity tolerance and LE swelling. Reinforced modified techniques during ADL's. At end of session, patient seated in chair with call light and phone within reach, all lines and tubes intact. Pt would benefit from continued skilled OT to increase functional independence and quality of life. Eval Complexity: Low    Assessment of current deficits   Functional mobility [x]  ADLs [x] Strength [x]  Cognition []  Functional transfers  [x] IADLs [] Safety Awareness [x]  Endurance [x]  Fine Motor Coordination [] Balance [x] Vision/perception [] Sensation []   Gross Motor Coordination [] ROM [] Delirium []                  Motor Control []    Plan of Care:   ADL retraining [x]   Equipment needs [x]   Neuromuscular re-education [x] Energy Conservation Techniques [x]  Functional Transfer training [x] Patient and/or Family Education [x]  Functional Mobility training [x]  Environmental Modifications [x]  Cognitive re-training []   Compensatory techniques for ADLs [x]  Splinting Needs []   Positioning to improve overall function [x]   Therapeutic Activity [x]  Therapeutic Exercise  [x]  Visual/Perceptual: []    Delirium prevention/treatment  []   Other:  []    Rehab Potential: Good for established goals    Patient / Family Goal:  Not stated     Patient and/or family were instructed diagnosis, prognosis/goals and plan of care. Demonstrated fair understanding. [] Malnutrition indicators have been identified and nursing has been notified to ensure a dietitian consult is ordered.        Low

## 2018-11-12 NOTE — PROGRESS NOTES
Physical Therapy    Facility/Department: [facility] MED SURG  Initial Assessment    NAME: Joanne Kraus  : 1955  MRN: [id number]    Date of Service: 2018    Evaluating Therapist: Iva Munoz. Carly Sun, P.T. Room #: 3527/8922-T  DIAGNOSIS: GI bleed  PRECAUTIONS: falls    Social:  Pt lives with  and grandson in a 2 floor plan 3 steps and 1 rail to enter and her bed and bath are on first floor. Prior to admission pt walked with a rollator ww and uses 4L home O2     Initial Evaluation  Date: 18 Treatment      Short Term/ Long Term   Goals   Was pt agreeable to Eval/treatment? yes     Does pt have pain? No c/o pain     Bed Mobility  Rolling: Independent  Supine to sit: NA  Sit to supine: Independent  Scooting: Independent  Independent   Transfers Sit to stand: Independent  Stand to sit: Independent  Stand pivot: Independent  Independent   Ambulation    25 feet with ww Independent  150 feet with ww Independent   Stair negotiation: ascended and descended NA  4 steps with 1 rail Independent   AM-PAC Raw Score  23/24       Pt is alert and Oriented x3  BLE ROM is WFL. BLE strength is grossly 4+/5. Balance: sitting and standing Independent  Sensation: No c/o numbness or tingling. Edema: BLE  Endurance: fair     ASSESSMENT  Pt displays functional ability as noted in the objective portion of this evaluation. Comments/Treatment:  Pt was found up in chair and walked 25 feet to transport cart and c/o mild SOB. She had no LOB with amb. Pt was left supine on transport cart. Rehab potential is Good for reaching above PT goals. Pts/ family goals   1. To improve her breathing and endurance. Patient and or family understand(s) diagnosis, prognosis, and plan of care. PLAN  PT care will be provided in accordance with the objectives noted above. Whenever appropriate, clear delegation orders will be provided for nursing staff.   Exercises and functional mobility practice will be used as

## 2018-11-13 VITALS
RESPIRATION RATE: 16 BRPM | DIASTOLIC BLOOD PRESSURE: 71 MMHG | HEIGHT: 63 IN | OXYGEN SATURATION: 95 % | TEMPERATURE: 98.1 F | WEIGHT: 293 LBS | SYSTOLIC BLOOD PRESSURE: 156 MMHG | HEART RATE: 87 BPM | BODY MASS INDEX: 51.91 KG/M2

## 2018-11-13 DIAGNOSIS — G89.4 CHRONIC PAIN SYNDROME: ICD-10-CM

## 2018-11-13 LAB
HCT VFR BLD CALC: 29.6 % (ref 34–48)
HEMOGLOBIN: 8.6 G/DL (ref 11.5–15.5)
MCH RBC QN AUTO: 24 PG (ref 26–35)
MCHC RBC AUTO-ENTMCNC: 29.1 % (ref 32–34.5)
MCV RBC AUTO: 82.7 FL (ref 80–99.9)
METER GLUCOSE: 237 MG/DL (ref 74–99)
METER GLUCOSE: 252 MG/DL (ref 74–99)
METER GLUCOSE: 308 MG/DL (ref 74–99)
PDW BLD-RTO: 17.2 FL (ref 11.5–15)
PLATELET # BLD: 319 E9/L (ref 130–450)
PMV BLD AUTO: 9.1 FL (ref 7–12)
RBC # BLD: 3.58 E12/L (ref 3.5–5.5)
WBC # BLD: 5.9 E9/L (ref 4.5–11.5)

## 2018-11-13 PROCEDURE — 94640 AIRWAY INHALATION TREATMENT: CPT

## 2018-11-13 PROCEDURE — 6370000000 HC RX 637 (ALT 250 FOR IP): Performed by: INTERNAL MEDICINE

## 2018-11-13 PROCEDURE — 85027 COMPLETE CBC AUTOMATED: CPT

## 2018-11-13 PROCEDURE — 2700000000 HC OXYGEN THERAPY PER DAY

## 2018-11-13 PROCEDURE — 99239 HOSP IP/OBS DSCHRG MGMT >30: CPT | Performed by: INTERNAL MEDICINE

## 2018-11-13 PROCEDURE — 82962 GLUCOSE BLOOD TEST: CPT

## 2018-11-13 PROCEDURE — 36415 COLL VENOUS BLD VENIPUNCTURE: CPT

## 2018-11-13 PROCEDURE — 2580000003 HC RX 258: Performed by: INTERNAL MEDICINE

## 2018-11-13 RX ADMIN — OXYCODONE AND ACETAMINOPHEN 1 TABLET: 10; 325 TABLET ORAL at 10:35

## 2018-11-13 RX ADMIN — INSULIN LISPRO 50 UNITS: 100 INJECTION, SOLUTION INTRAVENOUS; SUBCUTANEOUS at 11:47

## 2018-11-13 RX ADMIN — SPIRONOLACTONE 25 MG: 25 TABLET ORAL at 10:37

## 2018-11-13 RX ADMIN — IPRATROPIUM BROMIDE AND ALBUTEROL SULFATE 3 ML: .5; 3 SOLUTION RESPIRATORY (INHALATION) at 04:36

## 2018-11-13 RX ADMIN — ESCITALOPRAM OXALATE 20 MG: 10 TABLET ORAL at 10:36

## 2018-11-13 RX ADMIN — SULFAMETHOXAZOLE AND TRIMETHOPRIM 1 TABLET: 800; 160 TABLET ORAL at 10:37

## 2018-11-13 RX ADMIN — INSULIN LISPRO 50 UNITS: 100 INJECTION, SOLUTION INTRAVENOUS; SUBCUTANEOUS at 06:35

## 2018-11-13 RX ADMIN — FERROUS SULFATE TAB 325 MG (65 MG ELEMENTAL FE) 325 MG: 325 (65 FE) TAB at 10:36

## 2018-11-13 RX ADMIN — GABAPENTIN 400 MG: 400 CAPSULE ORAL at 10:37

## 2018-11-13 RX ADMIN — TORSEMIDE 20 MG: 20 TABLET ORAL at 10:36

## 2018-11-13 RX ADMIN — IPRATROPIUM BROMIDE AND ALBUTEROL SULFATE 3 ML: .5; 3 SOLUTION RESPIRATORY (INHALATION) at 08:48

## 2018-11-13 RX ADMIN — NYSTATIN: 100000 CREAM TOPICAL at 10:37

## 2018-11-13 RX ADMIN — OXYCODONE AND ACETAMINOPHEN 1 TABLET: 10; 325 TABLET ORAL at 03:56

## 2018-11-13 RX ADMIN — INSULIN LISPRO 9 UNITS: 100 INJECTION, SOLUTION INTRAVENOUS; SUBCUTANEOUS at 09:00

## 2018-11-13 RX ADMIN — PANTOPRAZOLE SODIUM 40 MG: 40 TABLET, DELAYED RELEASE ORAL at 06:35

## 2018-11-13 RX ADMIN — INSULIN LISPRO 6 UNITS: 100 INJECTION, SOLUTION INTRAVENOUS; SUBCUTANEOUS at 11:45

## 2018-11-13 RX ADMIN — FLUTICASONE PROPIONATE 2 SPRAY: 50 SPRAY, METERED NASAL at 10:36

## 2018-11-13 RX ADMIN — MOMETASONE FUROATE AND FORMOTEROL FUMARATE DIHYDRATE 2 PUFF: 200; 5 AEROSOL RESPIRATORY (INHALATION) at 08:49

## 2018-11-13 RX ADMIN — IPRATROPIUM BROMIDE AND ALBUTEROL SULFATE 3 ML: .5; 3 SOLUTION RESPIRATORY (INHALATION) at 00:59

## 2018-11-13 RX ADMIN — APIXABAN 5 MG: 5 TABLET, FILM COATED ORAL at 10:36

## 2018-11-13 RX ADMIN — CETIRIZINE HYDROCHLORIDE 10 MG: 10 TABLET, FILM COATED ORAL at 10:37

## 2018-11-13 RX ADMIN — DILTIAZEM HYDROCHLORIDE 240 MG: 240 CAPSULE, COATED, EXTENDED RELEASE ORAL at 10:36

## 2018-11-13 RX ADMIN — DOCUSATE SODIUM 100 MG: 100 CAPSULE, LIQUID FILLED ORAL at 10:36

## 2018-11-13 RX ADMIN — Medication 10 ML: at 10:37

## 2018-11-13 RX ADMIN — FENOFIBRATE 160 MG: 160 TABLET ORAL at 10:36

## 2018-11-13 RX ADMIN — METOPROLOL TARTRATE 100 MG: 50 TABLET ORAL at 10:36

## 2018-11-13 ASSESSMENT — PAIN SCALES - GENERAL
PAINLEVEL_OUTOF10: 7
PAINLEVEL_OUTOF10: 4
PAINLEVEL_OUTOF10: 6

## 2018-11-14 RX ORDER — CYCLOBENZAPRINE HCL 10 MG
TABLET ORAL
Qty: 90 TABLET | Refills: 5 | Status: ON HOLD | OUTPATIENT
Start: 2018-11-14 | End: 2018-12-05 | Stop reason: HOSPADM

## 2018-11-15 ENCOUNTER — HOSPITAL ENCOUNTER (OUTPATIENT)
Dept: WOUND CARE | Age: 63
Discharge: HOME | End: 2018-11-15

## 2018-11-18 LAB
EKG ATRIAL RATE: 98 BPM
EKG P AXIS: 67 DEGREES
EKG P-R INTERVAL: 136 MS
EKG Q-T INTERVAL: 384 MS
EKG QRS DURATION: 106 MS
EKG QTC CALCULATION (BAZETT): 490 MS
EKG R AXIS: 115 DEGREES
EKG T AXIS: 28 DEGREES
EKG VENTRICULAR RATE: 98 BPM

## 2018-11-20 ENCOUNTER — OFFICE VISIT (OUTPATIENT)
Dept: FAMILY MEDICINE CLINIC | Age: 63
End: 2018-11-20
Payer: COMMERCIAL

## 2018-11-20 VITALS
SYSTOLIC BLOOD PRESSURE: 139 MMHG | HEART RATE: 92 BPM | OXYGEN SATURATION: 90 % | DIASTOLIC BLOOD PRESSURE: 73 MMHG | TEMPERATURE: 97.6 F | RESPIRATION RATE: 18 BRPM

## 2018-11-20 DIAGNOSIS — E11.40 TYPE 2 DIABETES MELLITUS WITH DIABETIC NEUROPATHY, WITH LONG-TERM CURRENT USE OF INSULIN (HCC): Chronic | ICD-10-CM

## 2018-11-20 DIAGNOSIS — I82.5Z1 CHRONIC DEEP VEIN THROMBOSIS (DVT) OF DISTAL VEIN OF RIGHT LOWER EXTREMITY (HCC): ICD-10-CM

## 2018-11-20 DIAGNOSIS — Z79.4 TYPE 2 DIABETES MELLITUS WITH DIABETIC NEUROPATHY, WITH LONG-TERM CURRENT USE OF INSULIN (HCC): Chronic | ICD-10-CM

## 2018-11-20 DIAGNOSIS — I48.0 PAF (PAROXYSMAL ATRIAL FIBRILLATION) (HCC): Chronic | ICD-10-CM

## 2018-11-20 DIAGNOSIS — Z09 HOSPITAL DISCHARGE FOLLOW-UP: Primary | ICD-10-CM

## 2018-11-20 DIAGNOSIS — Z12.31 ENCOUNTER FOR SCREENING MAMMOGRAM FOR BREAST CANCER: ICD-10-CM

## 2018-11-20 DIAGNOSIS — I50.32 CHRONIC DIASTOLIC HEART FAILURE (HCC): Chronic | ICD-10-CM

## 2018-11-20 DIAGNOSIS — J44.9 CHRONIC OBSTRUCTIVE PULMONARY DISEASE, UNSPECIFIED COPD TYPE (HCC): ICD-10-CM

## 2018-11-20 PROCEDURE — 99214 OFFICE O/P EST MOD 30 MIN: CPT | Performed by: FAMILY MEDICINE

## 2018-11-20 PROCEDURE — 2022F DILAT RTA XM EVC RTNOPTHY: CPT | Performed by: FAMILY MEDICINE

## 2018-11-20 PROCEDURE — 1036F TOBACCO NON-USER: CPT | Performed by: FAMILY MEDICINE

## 2018-11-20 PROCEDURE — G8598 ASA/ANTIPLAT THER USED: HCPCS | Performed by: FAMILY MEDICINE

## 2018-11-20 PROCEDURE — 3017F COLORECTAL CA SCREEN DOC REV: CPT | Performed by: FAMILY MEDICINE

## 2018-11-20 PROCEDURE — 1111F DSCHRG MED/CURRENT MED MERGE: CPT | Performed by: FAMILY MEDICINE

## 2018-11-20 PROCEDURE — 3045F PR MOST RECENT HEMOGLOBIN A1C LEVEL 7.0-9.0%: CPT | Performed by: FAMILY MEDICINE

## 2018-11-20 PROCEDURE — G8484 FLU IMMUNIZE NO ADMIN: HCPCS | Performed by: FAMILY MEDICINE

## 2018-11-20 PROCEDURE — G8417 CALC BMI ABV UP PARAM F/U: HCPCS | Performed by: FAMILY MEDICINE

## 2018-11-20 PROCEDURE — G8926 SPIRO NO PERF OR DOC: HCPCS | Performed by: FAMILY MEDICINE

## 2018-11-20 PROCEDURE — 3023F SPIROM DOC REV: CPT | Performed by: FAMILY MEDICINE

## 2018-11-20 PROCEDURE — G8427 DOCREV CUR MEDS BY ELIG CLIN: HCPCS | Performed by: FAMILY MEDICINE

## 2018-11-20 ASSESSMENT — ENCOUNTER SYMPTOMS
BACK PAIN: 1
DIARRHEA: 0
SINUS PRESSURE: 0
CONSTIPATION: 0
BLOOD IN STOOL: 0
COLOR CHANGE: 0
SHORTNESS OF BREATH: 0
NAUSEA: 0
SINUS PAIN: 0
SORE THROAT: 0
ABDOMINAL PAIN: 0
EYE PAIN: 0
VOMITING: 0
COUGH: 0

## 2018-11-20 NOTE — PROGRESS NOTES
oxygen tank and does not have oxygen on currently. Her oxygen saturation in low today. Patient is usually on 4 L. She says she will get this refilled as soon as possible. She otherwise denies any dizziness, lightheadedness, chest pain, shortness of breath. Her shortness of breath is at baseline currently. Review of Systems   Constitutional: Positive for fatigue. Negative for chills and fever. HENT: Negative for congestion, postnasal drip, sinus pain, sinus pressure and sore throat. Eyes: Negative for pain. Respiratory: Negative for cough and shortness of breath. Cardiovascular: Negative for chest pain and leg swelling. Gastrointestinal: Negative for abdominal pain, blood in stool, constipation, diarrhea, nausea and vomiting. Endocrine: Negative for cold intolerance and heat intolerance. Genitourinary: Positive for difficulty urinating and frequency. Negative for dysuria and menstrual problem. Musculoskeletal: Positive for arthralgias, back pain, myalgias and neck pain. Skin: Negative for color change and wound. Allergic/Immunologic: Positive for environmental allergies. Neurological: Positive for numbness and headaches. Negative for dizziness, weakness and light-headedness. Hematological: Negative for adenopathy. Psychiatric/Behavioral: Positive for sleep disturbance. Negative for behavioral problems and dysphoric mood. The patient is not nervous/anxious. Vitals:    11/20/18 1159   BP: 139/73   Site: Left Lower Arm   Pulse: 92   Resp: 18   Temp: 97.6 °F (36.4 °C)   TempSrc: Temporal   SpO2: 90%     There is no height or weight on file to calculate BMI. Wt Readings from Last 3 Encounters:   11/13/18 298 lb 4.8 oz (135.3 kg)   11/02/18 (!) 308 lb (139.7 kg)   10/25/18 289 lb (131.1 kg)     BP Readings from Last 3 Encounters:   11/20/18 139/73   11/13/18 (!) 156/71   11/09/18 (!) 98/51       Physical Exam   Constitutional: She is oriented to person, place, and time.  She appears

## 2018-11-21 DIAGNOSIS — I10 ESSENTIAL HYPERTENSION: ICD-10-CM

## 2018-11-21 DIAGNOSIS — Z79.4 TYPE 2 DIABETES MELLITUS WITHOUT COMPLICATION, WITH LONG-TERM CURRENT USE OF INSULIN (HCC): ICD-10-CM

## 2018-11-21 DIAGNOSIS — E11.9 TYPE 2 DIABETES MELLITUS WITHOUT COMPLICATION, WITH LONG-TERM CURRENT USE OF INSULIN (HCC): ICD-10-CM

## 2018-11-21 DIAGNOSIS — E11.42 DIABETIC POLYNEUROPATHY ASSOCIATED WITH TYPE 2 DIABETES MELLITUS (HCC): ICD-10-CM

## 2018-11-23 RX ORDER — DILTIAZEM HYDROCHLORIDE 240 MG/1
CAPSULE, COATED, EXTENDED RELEASE ORAL
Qty: 180 CAPSULE | Refills: 1 | Status: SHIPPED | OUTPATIENT
Start: 2018-11-23 | End: 2019-10-14 | Stop reason: SDUPTHER

## 2018-11-23 RX ORDER — LANCETS 28 GAUGE
EACH MISCELLANEOUS
Qty: 200 EACH | Refills: 2 | Status: ON HOLD | OUTPATIENT
Start: 2018-11-23 | End: 2018-12-05 | Stop reason: HOSPADM

## 2018-11-23 RX ORDER — MONTELUKAST SODIUM 10 MG/1
TABLET ORAL
Qty: 90 TABLET | Refills: 1 | Status: ON HOLD | OUTPATIENT
Start: 2018-11-23 | End: 2018-12-05 | Stop reason: HOSPADM

## 2018-11-26 ENCOUNTER — CARE COORDINATION (OUTPATIENT)
Dept: CARE COORDINATION | Age: 63
End: 2018-11-26

## 2018-11-27 ENCOUNTER — HOSPITAL ENCOUNTER (INPATIENT)
Age: 63
LOS: 8 days | Discharge: NURSING, REHAB OR LONG-TERM CARE | DRG: 720 | End: 2018-12-05
Attending: EMERGENCY MEDICINE | Admitting: INTERNAL MEDICINE
Payer: COMMERCIAL

## 2018-11-27 ENCOUNTER — APPOINTMENT (OUTPATIENT)
Dept: GENERAL RADIOLOGY | Age: 63
DRG: 720 | End: 2018-11-27
Payer: COMMERCIAL

## 2018-11-27 DIAGNOSIS — E11.42 DIABETIC POLYNEUROPATHY ASSOCIATED WITH TYPE 2 DIABETES MELLITUS (HCC): ICD-10-CM

## 2018-11-27 DIAGNOSIS — L03.116 LEFT LEG CELLULITIS: ICD-10-CM

## 2018-11-27 DIAGNOSIS — A41.9 SEPTICEMIA (HCC): Primary | ICD-10-CM

## 2018-11-27 LAB
ABO/RH: NORMAL
ALBUMIN SERPL-MCNC: 3.4 G/DL (ref 3.5–5.2)
ALP BLD-CCNC: 96 U/L (ref 35–104)
ALT SERPL-CCNC: 17 U/L (ref 0–32)
ANION GAP SERPL CALCULATED.3IONS-SCNC: 10 MMOL/L (ref 7–16)
ANTIBODY SCREEN: NORMAL
AST SERPL-CCNC: 21 U/L (ref 0–31)
BASOPHILS ABSOLUTE: 0.02 E9/L (ref 0–0.2)
BASOPHILS RELATIVE PERCENT: 0.1 % (ref 0–2)
BILIRUB SERPL-MCNC: 0.4 MG/DL (ref 0–1.2)
BILIRUBIN URINE: NEGATIVE
BLOOD, URINE: NEGATIVE
BUN BLDV-MCNC: 22 MG/DL (ref 8–23)
CALCIUM SERPL-MCNC: 9.1 MG/DL (ref 8.6–10.2)
CHLORIDE BLD-SCNC: 93 MMOL/L (ref 98–107)
CLARITY: CLEAR
CO2: 31 MMOL/L (ref 22–29)
COLOR: YELLOW
CREAT SERPL-MCNC: 0.9 MG/DL (ref 0.5–1)
EOSINOPHILS ABSOLUTE: 0.08 E9/L (ref 0.05–0.5)
EOSINOPHILS RELATIVE PERCENT: 0.6 % (ref 0–6)
GFR AFRICAN AMERICAN: >60
GFR NON-AFRICAN AMERICAN: >60 ML/MIN/1.73
GLUCOSE BLD-MCNC: 237 MG/DL (ref 74–99)
GLUCOSE URINE: 100 MG/DL
HCT VFR BLD CALC: 28.9 % (ref 34–48)
HEMOGLOBIN: 8.5 G/DL (ref 11.5–15.5)
IMMATURE GRANULOCYTES #: 0.34 E9/L
IMMATURE GRANULOCYTES %: 2.4 % (ref 0–5)
INR BLD: 1.5
KETONES, URINE: NEGATIVE MG/DL
LACTIC ACID, SEPSIS: 1.3 MMOL/L (ref 0.5–1.9)
LEUKOCYTE ESTERASE, URINE: NEGATIVE
LIPASE: 9 U/L (ref 13–60)
LYMPHOCYTES ABSOLUTE: 1.09 E9/L (ref 1.5–4)
LYMPHOCYTES RELATIVE PERCENT: 7.8 % (ref 20–42)
MCH RBC QN AUTO: 23.8 PG (ref 26–35)
MCHC RBC AUTO-ENTMCNC: 29.4 % (ref 32–34.5)
MCV RBC AUTO: 81 FL (ref 80–99.9)
METER GLUCOSE: 383 MG/DL (ref 74–99)
MONOCYTES ABSOLUTE: 0.65 E9/L (ref 0.1–0.95)
MONOCYTES RELATIVE PERCENT: 4.7 % (ref 2–12)
NEUTROPHILS ABSOLUTE: 11.78 E9/L (ref 1.8–7.3)
NEUTROPHILS RELATIVE PERCENT: 84.4 % (ref 43–80)
NITRITE, URINE: NEGATIVE
PDW BLD-RTO: 17.6 FL (ref 11.5–15)
PH UA: 6 (ref 5–9)
PLATELET # BLD: 396 E9/L (ref 130–450)
PMV BLD AUTO: 9.4 FL (ref 7–12)
POTASSIUM REFLEX MAGNESIUM: 4.6 MMOL/L (ref 3.5–5)
PRO-BNP: 298 PG/ML (ref 0–125)
PROTEIN UA: NEGATIVE MG/DL
PROTHROMBIN TIME: 16.4 SEC (ref 9.3–12.4)
RBC # BLD: 3.57 E12/L (ref 3.5–5.5)
SODIUM BLD-SCNC: 134 MMOL/L (ref 132–146)
SPECIFIC GRAVITY UA: 1.02 (ref 1–1.03)
TOTAL PROTEIN: 6.9 G/DL (ref 6.4–8.3)
TROPONIN: <0.01 NG/ML (ref 0–0.03)
UROBILINOGEN, URINE: 0.2 E.U./DL
WBC # BLD: 14 E9/L (ref 4.5–11.5)

## 2018-11-27 PROCEDURE — 85025 COMPLETE CBC W/AUTO DIFF WBC: CPT

## 2018-11-27 PROCEDURE — 85610 PROTHROMBIN TIME: CPT

## 2018-11-27 PROCEDURE — 83880 ASSAY OF NATRIURETIC PEPTIDE: CPT

## 2018-11-27 PROCEDURE — 86901 BLOOD TYPING SEROLOGIC RH(D): CPT

## 2018-11-27 PROCEDURE — 2060000000 HC ICU INTERMEDIATE R&B

## 2018-11-27 PROCEDURE — 96365 THER/PROPH/DIAG IV INF INIT: CPT

## 2018-11-27 PROCEDURE — 86900 BLOOD TYPING SEROLOGIC ABO: CPT

## 2018-11-27 PROCEDURE — 99285 EMERGENCY DEPT VISIT HI MDM: CPT

## 2018-11-27 PROCEDURE — 6370000000 HC RX 637 (ALT 250 FOR IP): Performed by: INTERNAL MEDICINE

## 2018-11-27 PROCEDURE — 2580000003 HC RX 258: Performed by: INTERNAL MEDICINE

## 2018-11-27 PROCEDURE — 71045 X-RAY EXAM CHEST 1 VIEW: CPT

## 2018-11-27 PROCEDURE — 87040 BLOOD CULTURE FOR BACTERIA: CPT

## 2018-11-27 PROCEDURE — 2580000003 HC RX 258: Performed by: EMERGENCY MEDICINE

## 2018-11-27 PROCEDURE — 81003 URINALYSIS AUTO W/O SCOPE: CPT

## 2018-11-27 PROCEDURE — 80053 COMPREHEN METABOLIC PANEL: CPT

## 2018-11-27 PROCEDURE — 6360000002 HC RX W HCPCS: Performed by: EMERGENCY MEDICINE

## 2018-11-27 PROCEDURE — 82962 GLUCOSE BLOOD TEST: CPT

## 2018-11-27 PROCEDURE — 94640 AIRWAY INHALATION TREATMENT: CPT

## 2018-11-27 PROCEDURE — 84484 ASSAY OF TROPONIN QUANT: CPT

## 2018-11-27 PROCEDURE — 83605 ASSAY OF LACTIC ACID: CPT

## 2018-11-27 PROCEDURE — 36415 COLL VENOUS BLD VENIPUNCTURE: CPT

## 2018-11-27 PROCEDURE — 83690 ASSAY OF LIPASE: CPT

## 2018-11-27 PROCEDURE — 94664 DEMO&/EVAL PT USE INHALER: CPT

## 2018-11-27 PROCEDURE — 99223 1ST HOSP IP/OBS HIGH 75: CPT | Performed by: INTERNAL MEDICINE

## 2018-11-27 PROCEDURE — 87088 URINE BACTERIA CULTURE: CPT

## 2018-11-27 PROCEDURE — 86850 RBC ANTIBODY SCREEN: CPT

## 2018-11-27 PROCEDURE — 2700000000 HC OXYGEN THERAPY PER DAY

## 2018-11-27 RX ORDER — SPIRONOLACTONE 25 MG/1
25 TABLET ORAL DAILY
Status: DISCONTINUED | OUTPATIENT
Start: 2018-11-28 | End: 2018-12-05 | Stop reason: HOSPADM

## 2018-11-27 RX ORDER — TRAZODONE HYDROCHLORIDE 50 MG/1
50 TABLET ORAL NIGHTLY
Status: DISCONTINUED | OUTPATIENT
Start: 2018-11-27 | End: 2018-12-05 | Stop reason: HOSPADM

## 2018-11-27 RX ORDER — DOCUSATE SODIUM 100 MG/1
100 CAPSULE, LIQUID FILLED ORAL 3 TIMES DAILY PRN
Status: DISCONTINUED | OUTPATIENT
Start: 2018-11-27 | End: 2018-12-05 | Stop reason: HOSPADM

## 2018-11-27 RX ORDER — DILTIAZEM HYDROCHLORIDE 240 MG/1
240 CAPSULE, COATED, EXTENDED RELEASE ORAL 2 TIMES DAILY
Status: DISCONTINUED | OUTPATIENT
Start: 2018-11-27 | End: 2018-12-05 | Stop reason: HOSPADM

## 2018-11-27 RX ORDER — OXYCODONE AND ACETAMINOPHEN 10; 325 MG/1; MG/1
1 TABLET ORAL EVERY 4 HOURS PRN
Status: DISCONTINUED | OUTPATIENT
Start: 2018-11-27 | End: 2018-12-05 | Stop reason: HOSPADM

## 2018-11-27 RX ORDER — INSULIN GLARGINE 100 [IU]/ML
60 INJECTION, SOLUTION SUBCUTANEOUS NIGHTLY
Status: DISCONTINUED | OUTPATIENT
Start: 2018-11-27 | End: 2018-11-28

## 2018-11-27 RX ORDER — FLUTICASONE PROPIONATE 50 MCG
2 SPRAY, SUSPENSION (ML) NASAL DAILY
Status: DISCONTINUED | OUTPATIENT
Start: 2018-11-28 | End: 2018-12-05 | Stop reason: HOSPADM

## 2018-11-27 RX ORDER — ALPRAZOLAM 0.25 MG/1
0.25 TABLET ORAL NIGHTLY PRN
Status: DISCONTINUED | OUTPATIENT
Start: 2018-11-27 | End: 2018-12-05 | Stop reason: HOSPADM

## 2018-11-27 RX ORDER — DEXTROSE MONOHYDRATE 50 MG/ML
100 INJECTION, SOLUTION INTRAVENOUS PRN
Status: DISCONTINUED | OUTPATIENT
Start: 2018-11-27 | End: 2018-12-05 | Stop reason: HOSPADM

## 2018-11-27 RX ORDER — CETIRIZINE HYDROCHLORIDE 10 MG/1
10 TABLET ORAL DAILY
Status: DISCONTINUED | OUTPATIENT
Start: 2018-11-28 | End: 2018-12-05 | Stop reason: HOSPADM

## 2018-11-27 RX ORDER — SODIUM CHLORIDE 0.9 % (FLUSH) 0.9 %
10 SYRINGE (ML) INJECTION PRN
Status: DISCONTINUED | OUTPATIENT
Start: 2018-11-27 | End: 2018-12-05 | Stop reason: HOSPADM

## 2018-11-27 RX ORDER — FERROUS SULFATE 325(65) MG
325 TABLET ORAL
Status: DISCONTINUED | OUTPATIENT
Start: 2018-11-28 | End: 2018-12-05 | Stop reason: HOSPADM

## 2018-11-27 RX ORDER — GABAPENTIN 400 MG/1
400 CAPSULE ORAL 3 TIMES DAILY
Status: DISCONTINUED | OUTPATIENT
Start: 2018-11-27 | End: 2018-12-05 | Stop reason: HOSPADM

## 2018-11-27 RX ORDER — METOPROLOL TARTRATE 50 MG/1
100 TABLET, FILM COATED ORAL 2 TIMES DAILY
Status: DISCONTINUED | OUTPATIENT
Start: 2018-11-27 | End: 2018-12-05 | Stop reason: HOSPADM

## 2018-11-27 RX ORDER — ONDANSETRON 2 MG/ML
4 INJECTION INTRAMUSCULAR; INTRAVENOUS EVERY 6 HOURS PRN
Status: DISCONTINUED | OUTPATIENT
Start: 2018-11-27 | End: 2018-12-05 | Stop reason: HOSPADM

## 2018-11-27 RX ORDER — SODIUM CHLORIDE 0.9 % (FLUSH) 0.9 %
10 SYRINGE (ML) INJECTION EVERY 12 HOURS SCHEDULED
Status: DISCONTINUED | OUTPATIENT
Start: 2018-11-27 | End: 2018-12-05 | Stop reason: HOSPADM

## 2018-11-27 RX ORDER — ALBUTEROL SULFATE 90 UG/1
2 AEROSOL, METERED RESPIRATORY (INHALATION) EVERY 4 HOURS PRN
Status: DISCONTINUED | OUTPATIENT
Start: 2018-11-27 | End: 2018-12-05 | Stop reason: HOSPADM

## 2018-11-27 RX ORDER — ACETAMINOPHEN 325 MG/1
650 TABLET ORAL EVERY 4 HOURS PRN
Status: DISCONTINUED | OUTPATIENT
Start: 2018-11-27 | End: 2018-12-05 | Stop reason: HOSPADM

## 2018-11-27 RX ORDER — SODIUM CHLORIDE 0.9 % (FLUSH) 0.9 %
10 SYRINGE (ML) INJECTION EVERY 12 HOURS SCHEDULED
Status: DISCONTINUED | OUTPATIENT
Start: 2018-11-27 | End: 2018-11-27 | Stop reason: SDUPTHER

## 2018-11-27 RX ORDER — TORSEMIDE 20 MG/1
20 TABLET ORAL DAILY
Status: DISCONTINUED | OUTPATIENT
Start: 2018-11-28 | End: 2018-12-05 | Stop reason: HOSPADM

## 2018-11-27 RX ORDER — ESCITALOPRAM OXALATE 10 MG/1
20 TABLET ORAL EVERY MORNING
Status: DISCONTINUED | OUTPATIENT
Start: 2018-11-28 | End: 2018-12-05 | Stop reason: HOSPADM

## 2018-11-27 RX ORDER — SODIUM CHLORIDE 0.9 % (FLUSH) 0.9 %
10 SYRINGE (ML) INJECTION PRN
Status: DISCONTINUED | OUTPATIENT
Start: 2018-11-27 | End: 2018-11-27 | Stop reason: SDUPTHER

## 2018-11-27 RX ORDER — 0.9 % SODIUM CHLORIDE 0.9 %
1000 INTRAVENOUS SOLUTION INTRAVENOUS ONCE
Status: COMPLETED | OUTPATIENT
Start: 2018-11-27 | End: 2018-11-27

## 2018-11-27 RX ORDER — PANTOPRAZOLE SODIUM 40 MG/1
40 TABLET, DELAYED RELEASE ORAL
Status: DISCONTINUED | OUTPATIENT
Start: 2018-11-28 | End: 2018-12-05 | Stop reason: HOSPADM

## 2018-11-27 RX ORDER — MONTELUKAST SODIUM 10 MG/1
10 TABLET ORAL NIGHTLY
Status: DISCONTINUED | OUTPATIENT
Start: 2018-11-27 | End: 2018-12-05 | Stop reason: HOSPADM

## 2018-11-27 RX ORDER — DEXTROSE MONOHYDRATE 25 G/50ML
12.5 INJECTION, SOLUTION INTRAVENOUS PRN
Status: DISCONTINUED | OUTPATIENT
Start: 2018-11-27 | End: 2018-12-05 | Stop reason: HOSPADM

## 2018-11-27 RX ORDER — FENOFIBRATE 160 MG/1
160 TABLET ORAL DAILY
Status: DISCONTINUED | OUTPATIENT
Start: 2018-11-28 | End: 2018-12-05 | Stop reason: HOSPADM

## 2018-11-27 RX ORDER — NICOTINE POLACRILEX 4 MG
15 LOZENGE BUCCAL PRN
Status: DISCONTINUED | OUTPATIENT
Start: 2018-11-27 | End: 2018-12-05 | Stop reason: HOSPADM

## 2018-11-27 RX ADMIN — DILTIAZEM HYDROCHLORIDE 240 MG: 240 CAPSULE, COATED, EXTENDED RELEASE ORAL at 21:41

## 2018-11-27 RX ADMIN — VANCOMYCIN HYDROCHLORIDE 3000 MG: 1 INJECTION, POWDER, LYOPHILIZED, FOR SOLUTION INTRAVENOUS at 15:56

## 2018-11-27 RX ADMIN — SODIUM CHLORIDE 1000 ML: 9 INJECTION, SOLUTION INTRAVENOUS at 14:58

## 2018-11-27 RX ADMIN — ALBUTEROL SULFATE 2 PUFF: 90 AEROSOL, METERED RESPIRATORY (INHALATION) at 21:51

## 2018-11-27 RX ADMIN — GABAPENTIN 400 MG: 400 CAPSULE ORAL at 21:41

## 2018-11-27 RX ADMIN — TRAZODONE HYDROCHLORIDE 50 MG: 50 TABLET ORAL at 21:41

## 2018-11-27 RX ADMIN — APIXABAN 5 MG: 5 TABLET, FILM COATED ORAL at 21:41

## 2018-11-27 RX ADMIN — MOMETASONE FUROATE AND FORMOTEROL FUMARATE DIHYDRATE 2 PUFF: 200; 5 AEROSOL RESPIRATORY (INHALATION) at 21:50

## 2018-11-27 RX ADMIN — METOPROLOL TARTRATE 100 MG: 50 TABLET ORAL at 21:41

## 2018-11-27 RX ADMIN — OXYCODONE AND ACETAMINOPHEN 1 TABLET: 10; 325 TABLET ORAL at 21:45

## 2018-11-27 RX ADMIN — INSULIN GLARGINE 60 UNITS: 100 INJECTION, SOLUTION SUBCUTANEOUS at 21:39

## 2018-11-27 RX ADMIN — Medication 10 ML: at 21:39

## 2018-11-27 RX ADMIN — MONTELUKAST SODIUM 10 MG: 10 TABLET, FILM COATED ORAL at 21:41

## 2018-11-27 RX ADMIN — INSULIN LISPRO 5 UNITS: 100 INJECTION, SOLUTION INTRAVENOUS; SUBCUTANEOUS at 21:40

## 2018-11-27 RX ADMIN — CEFEPIME HYDROCHLORIDE 2 G: 2 INJECTION, POWDER, FOR SOLUTION INTRAVENOUS at 14:58

## 2018-11-27 ASSESSMENT — ENCOUNTER SYMPTOMS
BACK PAIN: 0
WHEEZING: 0
COLOR CHANGE: 1
EYE REDNESS: 0
EYE PAIN: 0
VOMITING: 0
NAUSEA: 0
SINUS PRESSURE: 0
ABDOMINAL DISTENTION: 0
SORE THROAT: 0
DIARRHEA: 0
SHORTNESS OF BREATH: 0
EYE DISCHARGE: 0
COUGH: 0

## 2018-11-27 ASSESSMENT — PAIN SCALES - GENERAL
PAINLEVEL_OUTOF10: 8
PAINLEVEL_OUTOF10: 8
PAINLEVEL_OUTOF10: 10

## 2018-11-27 ASSESSMENT — PAIN DESCRIPTION - FREQUENCY: FREQUENCY: CONTINUOUS

## 2018-11-27 ASSESSMENT — PAIN DESCRIPTION - PAIN TYPE
TYPE: ACUTE PAIN;CHRONIC PAIN
TYPE: ACUTE PAIN;CHRONIC PAIN

## 2018-11-27 ASSESSMENT — PAIN DESCRIPTION - LOCATION
LOCATION: LEG
LOCATION: LEG

## 2018-11-27 ASSESSMENT — PAIN DESCRIPTION - ORIENTATION
ORIENTATION: RIGHT;LEFT
ORIENTATION: RIGHT;LEFT

## 2018-11-27 ASSESSMENT — PAIN DESCRIPTION - PROGRESSION: CLINICAL_PROGRESSION: GRADUALLY WORSENING

## 2018-11-27 ASSESSMENT — PAIN DESCRIPTION - DESCRIPTORS: DESCRIPTORS: ACHING;DISCOMFORT;SORE;SHOOTING

## 2018-11-27 ASSESSMENT — PAIN DESCRIPTION - ONSET: ONSET: ON-GOING

## 2018-11-27 NOTE — H&P
Diana/Timmy, [facility]    ANOSCOPY  4/9/2007    injection of anal sphincter with Botox, Dr. Scarlet Green, [facility] ANOSCOPY  6/13/2007    injection of anal sphincter with Botox, Franklyn Castellanos [facility]    ANUS SURGERY  4/4/2006    Lateral sphincterotomy for chronic anal fissure, Dr[facility] Rogelio C[facility]os, [facility]  4/18/2012    anal exam and injection of Botox 100 units into anal sphincter, Laila Castellanos, [facility]    APPENDECTOMY  1965   [address]    COLONOSCOPY  1/20/2004    cecal polyp, bx (no pathologic changes), Dr. Natasha Hui, [facility] COLONOSCOPY  8/11/2006    snare polypectomy terminal ileum polyp (granulation tissue polyp) and anal polyp (inflammatory/papilla), Dr. Scarlet Green, [facility] COLONOSCOPY  3/23/2012    bx/cauterization proximal ascending colon polyp, injection of anal sphincter with Botox, Dr. Scarlet Green, [address] ENDOSCOPY  1/20/2004    gastritis, bx (no H pylori), Dr. Natasha Hui, [address] ENDOSCOPY N/A 6/1/2018    EGD BIOPSY performed by Shahram Vitale MD at [facility] 11/9/2018    EGD BIOPSY performed by Shahram Vitale MD at [facility] ENDOSCOPY       Medications Prior to Admission:    Prior to Admission medications    Medication Sig Start Date End Date Taking?  Authorizing Provider   insulin lispro (HUMALOG) 100 UNIT/ML injection vial Inject 92 Units into the skin Daily with lunch   Yes Historical Provider, MD   insulin lispro (HUMALOG) 100 UNIT/ML injection vial Inject 94 Units into the skin Daily with supper   Yes Historical Provider, MD   diltiazem (CARDIZEM CD) 240 MG extended release capsule TAKE 1 CAPSULE BY MOUTH 2 TIMES DAILY  Patient taking differently: Take 240 mg by mouth 2 times daily  11/23/18  Yes Jennifer Alvarez,    montelukast (SINGULAIR) 10 MG tablet TAKE 1 TABLET BY MORNING 5/24/18  Yes Historical Provider, MD   nystatin (MYCOSTATIN) 773880 UNIT/GM cream Apply topically 2 times daily. 6/12/18  Yes Saloni Starr DO   escitalopram (LEXAPRO) 20 MG tablet TAKE 1 TABLET BY MOUTH DAILY  Patient taking differently: Take 20 mg by mouth every morning  6/6/18  Yes Jennifer Alvarez DO   ferrous sulfate (NAOMY-ALEC) 325 (65 Fe) MG tablet Take 1 tablet by mouth daily (with breakfast) 4/26/18  Yes RAUL Treviño - CNP   insulin glargine (LANTUS) 100 UNIT/ML injection vial Inject 110 Units into the skin nightly  Patient taking differently: Inject 100 Units into the skin nightly  4/16/18  Yes Natasha Shen MD   docusate sodium (COLACE) 100 MG capsule TAKE 1 CAPSULE BY MOUTH 3 TIMES DAILY  Patient taking differently: Take 100 mg by mouth 3 times daily as needed for Constipation  3/10/18  Yes Vero Velarde MD   fluticasone (FLONASE) 50 MCG/ACT nasal spray USE 2 SPRAYS BY NASAL ROUTE DAILY  Patient taking differently: USE 2 SPRAYS BY NASAL ROUTE DAILY AS NEEDED 2/27/18  Yes Nicol Ramsay MD   oxyCODONE-acetaminophen (PERCOCET)  MG per tablet Take 1 tablet by mouth every 4 hours as needed for Pain. Yes Historical Provider, MD   OXYGEN Inhale 4 L into the lungs continuous    Yes Historical Provider, MD   lansoprazole (PREVACID) 30 MG delayed release capsule TAKE 1 CAPSULE BY MOUTH DAILY  Patient taking differently: TAKE 1 CAPSULE BY MOUTH EVERY MORNING 2/20/18  Yes Nicol Ramsay MD   fenofibrate (TRICOR) 145 MG tablet TAKE 1 TABLET BY MOUTH DAILY  Patient taking differently: TAKE 1 TABLET BY MOUTH EVERY MORNING 2/20/18  Yes Nicol Ramsay MD   DULERA 200-5 MCG/ACT inhaler INHALE 2 PUFFS INTO LUNGS TWICE A DAY RINSE MOUTH AFTER USE 3/20/17  Yes Nicol Ramsay MD   ipratropium-albuterol (DUONEB) 0.5-2.5 (3) MG/3ML SOLN nebulizer solution Inhale 3 mLs into the lungs every 4 hours.   Patient taking differently: Inhale 1 vial into the lungs every 4 hours as

## 2018-11-27 NOTE — ED PROVIDER NOTES
regarding the evidence-based evaluation and treatment of leg wounds  History: This patient states she has long history of lymphedema and chronic wounds to her lower legs. She follows with the wound center. She states her last 2 days, her legs become erythematous and have started draining fluid. My findings: Manuel King is a 57 y.o. female whom is in no distress. Physical exam reveals her heart is tachycardic, lungs are clear bilaterally. Abdomen is soft and nontender. Obese. Bilateral lower extremities demonstrates significant chronic edema. Her left lower leg has severe erythema with draining wounds. Her right toe much lesser degree. My plan: Symptomatic and supportive care. Sepsis evaluation, antibiotics, admission. Electronically signed by Aaliyah Palacios DO on 18 at 3:24 PM        [TG]      ED Course User Index  [TG] Aaliyah Palacios DO       --------------------------------------------- PAST HISTORY ---------------------------------------------  Past Medical History:  has a past medical history of Anal fissure; Anemia; Anxiety and depression; Arthritis; Asthma; Atrial fibrillation ([facility] [address] Blood transfusion; CHF (congestive heart failure) ([facility] [address] COPD (chronic obstructive pulmonary disease) ([facility] [address] Disc disorder; [facility] Fall due to stumbling; GERD (gastroesophageal reflux disease); Hx of blood clots; Hyperlipidemia; Hypertension; Inappropriate sinus tachycardia; LVH (left ventricular hypertrophy); Lymphadenitis, chronic; Occipital neuralgia; Respiratory acidosis; and Sinus tachycardia. Past Surgical History:  has a past surgical history that includes Tonsillectomy (); Appendectomy ();  section ();  section ();  section (); Anus surgery (2006); Upper gastrointestinal endoscopy (2004); Colonoscopy (2004); Colonoscopy (2006); anoscopy (10/25/2006); anoscopy (2007); anoscopy (2007);  Colonoscopy DO Britany  Resident  11/27/18 2006

## 2018-11-28 LAB
ANION GAP SERPL CALCULATED.3IONS-SCNC: 9 MMOL/L (ref 7–16)
ANISOCYTOSIS: ABNORMAL
BASOPHILS ABSOLUTE: 0.03 E9/L (ref 0–0.2)
BASOPHILS RELATIVE PERCENT: 0.2 % (ref 0–2)
BUN BLDV-MCNC: 16 MG/DL (ref 8–23)
CALCIUM SERPL-MCNC: 8.4 MG/DL (ref 8.6–10.2)
CHLORIDE BLD-SCNC: 96 MMOL/L (ref 98–107)
CO2: 29 MMOL/L (ref 22–29)
CREAT SERPL-MCNC: 0.8 MG/DL (ref 0.5–1)
EOSINOPHILS ABSOLUTE: 0.11 E9/L (ref 0.05–0.5)
EOSINOPHILS RELATIVE PERCENT: 0.9 % (ref 0–6)
GFR AFRICAN AMERICAN: >60
GFR NON-AFRICAN AMERICAN: >60 ML/MIN/1.73
GLUCOSE BLD-MCNC: 391 MG/DL (ref 74–99)
HCT VFR BLD CALC: 26.5 % (ref 34–48)
HEMOGLOBIN: 7.6 G/DL (ref 11.5–15.5)
HYPOCHROMIA: ABNORMAL
IMMATURE GRANULOCYTES #: 0.47 E9/L
IMMATURE GRANULOCYTES %: 3.8 % (ref 0–5)
LYMPHOCYTES ABSOLUTE: 1.08 E9/L (ref 1.5–4)
LYMPHOCYTES RELATIVE PERCENT: 8.7 % (ref 20–42)
MCH RBC QN AUTO: 23.5 PG (ref 26–35)
MCHC RBC AUTO-ENTMCNC: 28.7 % (ref 32–34.5)
MCV RBC AUTO: 82 FL (ref 80–99.9)
METER GLUCOSE: 310 MG/DL (ref 74–99)
METER GLUCOSE: 320 MG/DL (ref 74–99)
METER GLUCOSE: 423 MG/DL (ref 74–99)
METER GLUCOSE: 428 MG/DL (ref 74–99)
MONOCYTES ABSOLUTE: 0.78 E9/L (ref 0.1–0.95)
MONOCYTES RELATIVE PERCENT: 6.3 % (ref 2–12)
NEUTROPHILS ABSOLUTE: 9.99 E9/L (ref 1.8–7.3)
NEUTROPHILS RELATIVE PERCENT: 80.1 % (ref 43–80)
PDW BLD-RTO: 17.6 FL (ref 11.5–15)
PLATELET # BLD: 376 E9/L (ref 130–450)
PMV BLD AUTO: 9.3 FL (ref 7–12)
POLYCHROMASIA: ABNORMAL
POTASSIUM REFLEX MAGNESIUM: 4.7 MMOL/L (ref 3.5–5)
RBC # BLD: 3.23 E12/L (ref 3.5–5.5)
SODIUM BLD-SCNC: 134 MMOL/L (ref 132–146)
WBC # BLD: 12.5 E9/L (ref 4.5–11.5)

## 2018-11-28 PROCEDURE — 6370000000 HC RX 637 (ALT 250 FOR IP): Performed by: HOSPITALIST

## 2018-11-28 PROCEDURE — 6370000000 HC RX 637 (ALT 250 FOR IP): Performed by: INTERNAL MEDICINE

## 2018-11-28 PROCEDURE — G8987 SELF CARE CURRENT STATUS: HCPCS

## 2018-11-28 PROCEDURE — 6360000002 HC RX W HCPCS: Performed by: INTERNAL MEDICINE

## 2018-11-28 PROCEDURE — 94640 AIRWAY INHALATION TREATMENT: CPT

## 2018-11-28 PROCEDURE — 2700000000 HC OXYGEN THERAPY PER DAY

## 2018-11-28 PROCEDURE — 36415 COLL VENOUS BLD VENIPUNCTURE: CPT

## 2018-11-28 PROCEDURE — 99233 SBSQ HOSP IP/OBS HIGH 50: CPT | Performed by: HOSPITALIST

## 2018-11-28 PROCEDURE — 82962 GLUCOSE BLOOD TEST: CPT

## 2018-11-28 PROCEDURE — 2580000003 HC RX 258: Performed by: INTERNAL MEDICINE

## 2018-11-28 PROCEDURE — 97165 OT EVAL LOW COMPLEX 30 MIN: CPT

## 2018-11-28 PROCEDURE — 97161 PT EVAL LOW COMPLEX 20 MIN: CPT

## 2018-11-28 PROCEDURE — 85025 COMPLETE CBC W/AUTO DIFF WBC: CPT

## 2018-11-28 PROCEDURE — 80048 BASIC METABOLIC PNL TOTAL CA: CPT

## 2018-11-28 PROCEDURE — 1200000000 HC SEMI PRIVATE

## 2018-11-28 PROCEDURE — G8988 SELF CARE GOAL STATUS: HCPCS

## 2018-11-28 RX ORDER — PRENATAL VIT 91/IRON/FOLIC/DHA 28-975-200
COMBINATION PACKAGE (EA) ORAL 2 TIMES DAILY
Status: DISCONTINUED | OUTPATIENT
Start: 2018-11-28 | End: 2018-12-05 | Stop reason: HOSPADM

## 2018-11-28 RX ORDER — INSULIN GLARGINE 100 [IU]/ML
75 INJECTION, SOLUTION SUBCUTANEOUS NIGHTLY
Status: DISCONTINUED | OUTPATIENT
Start: 2018-11-28 | End: 2018-11-29

## 2018-11-28 RX ADMIN — INSULIN LISPRO 8 UNITS: 100 INJECTION, SOLUTION INTRAVENOUS; SUBCUTANEOUS at 11:40

## 2018-11-28 RX ADMIN — INSULIN LISPRO 12 UNITS: 100 INJECTION, SOLUTION INTRAVENOUS; SUBCUTANEOUS at 17:00

## 2018-11-28 RX ADMIN — INSULIN LISPRO 8 UNITS: 100 INJECTION, SOLUTION INTRAVENOUS; SUBCUTANEOUS at 17:39

## 2018-11-28 RX ADMIN — GABAPENTIN 400 MG: 400 CAPSULE ORAL at 13:17

## 2018-11-28 RX ADMIN — MOMETASONE FUROATE AND FORMOTEROL FUMARATE DIHYDRATE 2 PUFF: 200; 5 AEROSOL RESPIRATORY (INHALATION) at 08:44

## 2018-11-28 RX ADMIN — Medication 10 ML: at 22:05

## 2018-11-28 RX ADMIN — TRAZODONE HYDROCHLORIDE 50 MG: 50 TABLET ORAL at 21:38

## 2018-11-28 RX ADMIN — ALBUTEROL SULFATE 2 PUFF: 90 AEROSOL, METERED RESPIRATORY (INHALATION) at 09:17

## 2018-11-28 RX ADMIN — APIXABAN 5 MG: 5 TABLET, FILM COATED ORAL at 21:38

## 2018-11-28 RX ADMIN — OXYCODONE AND ACETAMINOPHEN 1 TABLET: 10; 325 TABLET ORAL at 00:31

## 2018-11-28 RX ADMIN — GABAPENTIN 400 MG: 400 CAPSULE ORAL at 08:44

## 2018-11-28 RX ADMIN — INSULIN GLARGINE 75 UNITS: 100 INJECTION, SOLUTION SUBCUTANEOUS at 21:42

## 2018-11-28 RX ADMIN — OXYCODONE AND ACETAMINOPHEN 1 TABLET: 10; 325 TABLET ORAL at 16:19

## 2018-11-28 RX ADMIN — SPIRONOLACTONE 25 MG: 25 TABLET ORAL at 08:44

## 2018-11-28 RX ADMIN — ALPRAZOLAM 0.25 MG: 0.25 TABLET ORAL at 21:40

## 2018-11-28 RX ADMIN — TERBINAFINE HYDROCHLORIDE: 1 CREAM TOPICAL at 21:40

## 2018-11-28 RX ADMIN — OXYCODONE AND ACETAMINOPHEN 1 TABLET: 10; 325 TABLET ORAL at 11:40

## 2018-11-28 RX ADMIN — OXYCODONE AND ACETAMINOPHEN 1 TABLET: 10; 325 TABLET ORAL at 06:47

## 2018-11-28 RX ADMIN — Medication 10 ML: at 08:44

## 2018-11-28 RX ADMIN — Medication 2000 MG: at 13:17

## 2018-11-28 RX ADMIN — ESCITALOPRAM OXALATE 20 MG: 10 TABLET ORAL at 08:44

## 2018-11-28 RX ADMIN — CEFEPIME 2 G: 2 INJECTION, POWDER, FOR SOLUTION INTRAMUSCULAR; INTRAVENOUS at 16:18

## 2018-11-28 RX ADMIN — METOPROLOL TARTRATE 100 MG: 50 TABLET ORAL at 21:38

## 2018-11-28 RX ADMIN — MOMETASONE FUROATE AND FORMOTEROL FUMARATE DIHYDRATE 2 PUFF: 200; 5 AEROSOL RESPIRATORY (INHALATION) at 20:03

## 2018-11-28 RX ADMIN — FERROUS SULFATE TAB 325 MG (65 MG ELEMENTAL FE) 325 MG: 325 (65 FE) TAB at 08:43

## 2018-11-28 RX ADMIN — CETIRIZINE HYDROCHLORIDE 10 MG: 10 TABLET, FILM COATED ORAL at 08:44

## 2018-11-28 RX ADMIN — MONTELUKAST SODIUM 10 MG: 10 TABLET, FILM COATED ORAL at 21:38

## 2018-11-28 RX ADMIN — TORSEMIDE 20 MG: 20 TABLET ORAL at 08:43

## 2018-11-28 RX ADMIN — ALBUTEROL SULFATE 2 PUFF: 90 AEROSOL, METERED RESPIRATORY (INHALATION) at 20:03

## 2018-11-28 RX ADMIN — FENOFIBRATE 160 MG: 160 TABLET ORAL at 08:43

## 2018-11-28 RX ADMIN — DILTIAZEM HYDROCHLORIDE 240 MG: 240 CAPSULE, COATED, EXTENDED RELEASE ORAL at 21:38

## 2018-11-28 RX ADMIN — GABAPENTIN 400 MG: 400 CAPSULE ORAL at 21:38

## 2018-11-28 RX ADMIN — INSULIN LISPRO 8 UNITS: 100 INJECTION, SOLUTION INTRAVENOUS; SUBCUTANEOUS at 08:45

## 2018-11-28 RX ADMIN — ACETAMINOPHEN 650 MG: 325 TABLET ORAL at 06:58

## 2018-11-28 RX ADMIN — DILTIAZEM HYDROCHLORIDE 240 MG: 240 CAPSULE, COATED, EXTENDED RELEASE ORAL at 08:44

## 2018-11-28 RX ADMIN — ALPRAZOLAM 0.25 MG: 0.25 TABLET ORAL at 00:00

## 2018-11-28 RX ADMIN — APIXABAN 5 MG: 5 TABLET, FILM COATED ORAL at 08:44

## 2018-11-28 RX ADMIN — PANTOPRAZOLE SODIUM 40 MG: 40 TABLET, DELAYED RELEASE ORAL at 06:47

## 2018-11-28 RX ADMIN — METOPROLOL TARTRATE 100 MG: 50 TABLET ORAL at 08:43

## 2018-11-28 RX ADMIN — OXYCODONE AND ACETAMINOPHEN 1 TABLET: 10; 325 TABLET ORAL at 21:37

## 2018-11-28 RX ADMIN — INSULIN LISPRO 6 UNITS: 100 INJECTION, SOLUTION INTRAVENOUS; SUBCUTANEOUS at 21:42

## 2018-11-28 RX ADMIN — FLUTICASONE PROPIONATE 2 SPRAY: 50 SPRAY, METERED NASAL at 08:53

## 2018-11-28 ASSESSMENT — PAIN DESCRIPTION - FREQUENCY
FREQUENCY: INTERMITTENT

## 2018-11-28 ASSESSMENT — PAIN DESCRIPTION - PROGRESSION
CLINICAL_PROGRESSION: NOT CHANGED

## 2018-11-28 ASSESSMENT — PAIN DESCRIPTION - LOCATION
LOCATION: BACK
LOCATION: GENERALIZED
LOCATION: BACK
LOCATION: LEG
LOCATION: BACK

## 2018-11-28 ASSESSMENT — PAIN DESCRIPTION - ONSET
ONSET: ON-GOING

## 2018-11-28 ASSESSMENT — PAIN SCALES - GENERAL
PAINLEVEL_OUTOF10: 4
PAINLEVEL_OUTOF10: 2
PAINLEVEL_OUTOF10: 8
PAINLEVEL_OUTOF10: 3
PAINLEVEL_OUTOF10: 0
PAINLEVEL_OUTOF10: 6
PAINLEVEL_OUTOF10: 9
PAINLEVEL_OUTOF10: 0
PAINLEVEL_OUTOF10: 7

## 2018-11-28 ASSESSMENT — PAIN DESCRIPTION - PAIN TYPE
TYPE: CHRONIC PAIN

## 2018-11-28 ASSESSMENT — PAIN DESCRIPTION - ORIENTATION
ORIENTATION: RIGHT;LEFT
ORIENTATION: MID;LOWER
ORIENTATION: LOWER
ORIENTATION: MID;LOWER

## 2018-11-28 ASSESSMENT — PAIN DESCRIPTION - DESCRIPTORS
DESCRIPTORS: DISCOMFORT
DESCRIPTORS: ACHING;DISCOMFORT

## 2018-11-28 NOTE — PROGRESS NOTES
Occupational Therapy  OCCUPATIONAL THERAPY INITIAL EVALUATION      Date:2018  Patient Name: Clarita Verduzco  MRN: [id number]  : 1955  Room: [address] [place], [place] [address]     Evaluating OT: Joshua Bhatti OTR/L    AM-PAC Daily Activity Scale Raw Score: 14  G-code: CK    Recommended Adaptive Equipment: TBA     Diagnosis: Sepsis. Pt presents to ED with R LE edema. Past Medical History: Obesity, DM, CHF  Precautions: falls, O2     Home Living/PLOF:   Patient lives with daughter and grandson in a 2 story home with 1st floor set up and 4 steps HR on entry. Pt sleeps in reclining chair and utilizes [facility] for toileting. Pt sponge bathes at basin level. Pt reports completing LB dressing of underwear and pants at Mod I with daughter assist for socks and shoes, toileting Mod I, sponge bathing assist for hair, gurmeet area and LB. Pt performs functional mobility with [employer] short distances at Mod I. Wears 2L home O2 continuous. Pain Level: pt c/o R LE pain this session     Hearing: WFL   Vision: WFL       Cognition: oriented x 4   Problem Solving: good   Safety Awareness: good     UE Assessment:    ROM:  RUE: WFL elbow to digits. Shoulders approx 90 degrees   LUE: WFL elbow to digits. Shoulders approx 90 degrees    B UE [address] WFL    Strength:  R UE: elbow to digits: 3+/5   L UE: elbow to digits: 3+/5        Functional Assessment:   Initial Status 18 Comments   Feeding  Set up    Grooming  Set up seated         Upper Body Dressing Mod A          Lower Body Dressing Max A    Bathing Max A Sponge bathing level   Toileting  Max A    Bed Mobility  Supine to Sit: NT pt seated in reclining chair on entry    Functional Transfers STS: Min A     Functional Mobility SBA with WW short in room mobility several steps forward and back Pt reports limited from further mobility due to pain in R LE at this time     Sit balance: fair  Stand balance: fair at [employer]  Endurance/Activity tolerance: poor.  Limited by R LE pain  Sensation: B LE chronic

## 2018-11-28 NOTE — PROGRESS NOTES
Contacted Lorraine KIDD at [facility]. Patient in hospital with Sepsis. Informed her that we are discharging patient for now. Dr. Karla Huggins notified.  Claude Cavanaugh

## 2018-11-28 NOTE — CONSULTS
[address] [place] [address] [facility]    Consultation Note     Admit Date: 11/27/2018  1:06 PM    Reason for Consult:   LE cellulitis    Attending Physician:  Elan Kulkarni*       Chief Complaint   Patient presents with    Wound Infection     pt has extensive wounds to BLE, likely gangrene         History Obtained From:  For a detailed history please see previous and current available medical records. HISTORY OF PRESENT ILLNESS:             The patient is a 57 y.o. female with chronic lymphedema admitted with worsening LLE cellulitis. She has chronic lymphedema. She also noticed bluish discoloration on the lateral side of the foot. She had drainage from the LE as well. Past Medical History:        Diagnosis Date    Anal fissure     Anemia 10/6/2017    Anxiety and depression     Arthritis     Asthma     At[facility] fibrillation ([facility]     Blood transfusion     long time no reaction    CHF [facility]congestive heart failure) ([facility]     Diastolic    COPD (chronic obstructive pulmonary disease) ([facility]     Disc disorder     DM2 (diabetes mellitus, type 2) ([facility] [facility]     on insulin    Fall due to stumbling 10/6/2017    GERD (gastroesophageal reflux disease)     Hx of blood clots     Hyperlipidemia     Hypertension     Inappropriate sinus tachycardia     LVH (left ventricular hypertrophy)     moderate    Lymphadenitis, chronic 4/13/2018    Occipital neuralgia 11/2/2011    Respiratory acidosis 10/7/2017    Sinus tachycardia 2/16/2015     Past Surgical History:        Procedure Laterality Date    ANOSCOPY  10/25/2006    injection of anal sphincter with Botox, Franklyn Ortiz/Timmy, [facility]    ANOSCOPY  4/9/2007    injection of anal sphincter with Botox, Dr. Andreas Moody, [address] [place] [address] ANOSCOPY  6/13/2007    injection of anal sphincter with Botox, Franklyn Chang [facility]    ANUS SURGERY  4/4/2006    [facility]  4/18/2012 albuterol sulfate HFA, ALPRAZolam, docusate sodium, oxyCODONE-acetaminophen, sodium chloride flush, magnesium hydroxide, ondansetron, glucose, dextrose, glucagon (rDNA), dextrose    Allergies:  Statins    Social History:   Social History     Social History    Marital status: Single     Spouse name: N/A    Number of children: 3    Years of education: 9     Occupational History    SSD      Social History Main Topics    Smoking status: Former Smoker     Packs/day: 1.50     Years: 25.00     Types: Cigarettes     Start date: 11/27/1979     Quit date: 12/18/2004    Smokeless tobacco: Never Used    Alcohol use No      Comment: denies caffeine    Drug use: No    Sexual activity: No     Other Topics Concern    None     Social History Narrative    None       Family History:   Family History   Problem Relation Age of Onset    Heart Disease Mother     Diabetes Father     Colon Cancer Father     Other Father         BLINDNESS    Colon Cancer Sister     Diabetes Paternal Aunt     Diabetes Paternal Uncle     Diabetes Paternal Aunt     Diabetes Paternal Uncle    . Otherwise non-pertinent to the chief complaint.     REVIEW OF SYSTEMS:    14 ROS negative unless otherwise specified in the HPI    PHYSICAL EXAM:    Vitals:    BP (!) 144/56   Pulse 102   Temp 98.5 °F (36.9 °C) (Oral)   Resp 16   Ht 5' 3\" (1.6 m)   Wt (!) 303 lb (137.4 kg)   LMP  (LMP Unknown)   SpO2 96%   BMI 53.67 kg/m²     General Appearance:    Alert, cooperative, no distress, appears stated age   Head:    Normocephalic, without obvious abnormality, atraumatic   Eyes:    PERRL, conjunctiva/corneas clear      Ears:    Normal and external ear canals, both ears   Nose:   Nares normal, septum midline, mucosa normal, no drainage    or sinus tenderness   Throat:   Lips, mucosa, and tongue normal; teeth and gums normal   Neck:   Supple, trachea midline, no adenopathy; no JVD   Lungs:     Clear to auscultation bilaterally, respirations unlabored

## 2018-11-29 ENCOUNTER — APPOINTMENT (OUTPATIENT)
Dept: GENERAL RADIOLOGY | Age: 63
DRG: 720 | End: 2018-11-29
Payer: COMMERCIAL

## 2018-11-29 ENCOUNTER — ANESTHESIA EVENT (OUTPATIENT)
Dept: OPERATING ROOM | Age: 63
DRG: 720 | End: 2018-11-29
Payer: COMMERCIAL

## 2018-11-29 ENCOUNTER — HOSPITAL ENCOUNTER (OUTPATIENT)
Dept: WOUND CARE | Age: 63
Discharge: HOME | End: 2018-11-29
Payer: COMMERCIAL

## 2018-11-29 LAB
BASOPHILS ABSOLUTE: 0.04 E9/L (ref 0–0.2)
BASOPHILS RELATIVE PERCENT: 0.3 % (ref 0–2)
EOSINOPHILS ABSOLUTE: 0.2 E9/L (ref 0.05–0.5)
EOSINOPHILS RELATIVE PERCENT: 1.4 % (ref 0–6)
HCT VFR BLD CALC: 27.6 % (ref 34–48)
HEMOGLOBIN: 8 G/DL (ref 11.5–15.5)
IMMATURE GRANULOCYTES #: 0.2 E9/L
IMMATURE GRANULOCYTES %: 1.4 % (ref 0–5)
LYMPHOCYTES ABSOLUTE: 1.62 E9/L (ref 1.5–4)
LYMPHOCYTES RELATIVE PERCENT: 11.5 % (ref 20–42)
MCH RBC QN AUTO: 23.3 PG (ref 26–35)
MCHC RBC AUTO-ENTMCNC: 29 % (ref 32–34.5)
MCV RBC AUTO: 80.5 FL (ref 80–99.9)
METER GLUCOSE: 404 MG/DL (ref 74–99)
METER GLUCOSE: 422 MG/DL (ref 74–99)
METER GLUCOSE: 444 MG/DL (ref 74–99)
METER GLUCOSE: 456 MG/DL (ref 74–99)
MONOCYTES ABSOLUTE: 0.78 E9/L (ref 0.1–0.95)
MONOCYTES RELATIVE PERCENT: 5.5 % (ref 2–12)
NEUTROPHILS ABSOLUTE: 11.3 E9/L (ref 1.8–7.3)
NEUTROPHILS RELATIVE PERCENT: 79.9 % (ref 43–80)
PDW BLD-RTO: 17.5 FL (ref 11.5–15)
PLATELET # BLD: 407 E9/L (ref 130–450)
PMV BLD AUTO: 9.5 FL (ref 7–12)
RBC # BLD: 3.43 E12/L (ref 3.5–5.5)
URINE CULTURE, ROUTINE: NORMAL
VANCOMYCIN TROUGH: 12.7 MCG/ML (ref 5–16)
WBC # BLD: 14.1 E9/L (ref 4.5–11.5)

## 2018-11-29 PROCEDURE — 36415 COLL VENOUS BLD VENIPUNCTURE: CPT

## 2018-11-29 PROCEDURE — 6370000000 HC RX 637 (ALT 250 FOR IP): Performed by: INTERNAL MEDICINE

## 2018-11-29 PROCEDURE — 87070 CULTURE OTHR SPECIMN AEROBIC: CPT

## 2018-11-29 PROCEDURE — 97530 THERAPEUTIC ACTIVITIES: CPT

## 2018-11-29 PROCEDURE — 1200000000 HC SEMI PRIVATE

## 2018-11-29 PROCEDURE — 82962 GLUCOSE BLOOD TEST: CPT

## 2018-11-29 PROCEDURE — 6360000002 HC RX W HCPCS: Performed by: INTERNAL MEDICINE

## 2018-11-29 PROCEDURE — 6370000000 HC RX 637 (ALT 250 FOR IP): Performed by: HOSPITALIST

## 2018-11-29 PROCEDURE — 97535 SELF CARE MNGMENT TRAINING: CPT

## 2018-11-29 PROCEDURE — 2580000003 HC RX 258: Performed by: INTERNAL MEDICINE

## 2018-11-29 PROCEDURE — 99255 IP/OBS CONSLTJ NEW/EST HI 80: CPT | Performed by: SURGERY

## 2018-11-29 PROCEDURE — 85025 COMPLETE CBC W/AUTO DIFF WBC: CPT

## 2018-11-29 PROCEDURE — 2700000000 HC OXYGEN THERAPY PER DAY

## 2018-11-29 PROCEDURE — 94640 AIRWAY INHALATION TREATMENT: CPT

## 2018-11-29 PROCEDURE — 87147 CULTURE TYPE IMMUNOLOGIC: CPT

## 2018-11-29 PROCEDURE — 73610 X-RAY EXAM OF ANKLE: CPT

## 2018-11-29 PROCEDURE — 80202 ASSAY OF VANCOMYCIN: CPT

## 2018-11-29 PROCEDURE — 73630 X-RAY EXAM OF FOOT: CPT

## 2018-11-29 PROCEDURE — 99233 SBSQ HOSP IP/OBS HIGH 50: CPT | Performed by: HOSPITALIST

## 2018-11-29 RX ORDER — INSULIN GLARGINE 100 [IU]/ML
100 INJECTION, SOLUTION SUBCUTANEOUS NIGHTLY
Status: DISCONTINUED | OUTPATIENT
Start: 2018-11-29 | End: 2018-12-05 | Stop reason: HOSPADM

## 2018-11-29 RX ADMIN — CEFEPIME 2 G: 2 INJECTION, POWDER, FOR SOLUTION INTRAMUSCULAR; INTRAVENOUS at 04:04

## 2018-11-29 RX ADMIN — DILTIAZEM HYDROCHLORIDE 240 MG: 240 CAPSULE, COATED, EXTENDED RELEASE ORAL at 20:47

## 2018-11-29 RX ADMIN — Medication 2000 MG: at 20:46

## 2018-11-29 RX ADMIN — ALPRAZOLAM 0.25 MG: 0.25 TABLET ORAL at 23:43

## 2018-11-29 RX ADMIN — MOMETASONE FUROATE AND FORMOTEROL FUMARATE DIHYDRATE 2 PUFF: 200; 5 AEROSOL RESPIRATORY (INHALATION) at 09:01

## 2018-11-29 RX ADMIN — Medication 10 ML: at 20:46

## 2018-11-29 RX ADMIN — APIXABAN 5 MG: 5 TABLET, FILM COATED ORAL at 08:36

## 2018-11-29 RX ADMIN — GABAPENTIN 400 MG: 400 CAPSULE ORAL at 20:47

## 2018-11-29 RX ADMIN — ALBUTEROL SULFATE 2 PUFF: 90 AEROSOL, METERED RESPIRATORY (INHALATION) at 09:02

## 2018-11-29 RX ADMIN — INSULIN LISPRO 8 UNITS: 100 INJECTION, SOLUTION INTRAVENOUS; SUBCUTANEOUS at 08:42

## 2018-11-29 RX ADMIN — OXYCODONE AND ACETAMINOPHEN 1 TABLET: 10; 325 TABLET ORAL at 06:13

## 2018-11-29 RX ADMIN — INSULIN GLARGINE 100 UNITS: 100 INJECTION, SOLUTION SUBCUTANEOUS at 20:48

## 2018-11-29 RX ADMIN — INSULIN LISPRO 8 UNITS: 100 INJECTION, SOLUTION INTRAVENOUS; SUBCUTANEOUS at 16:49

## 2018-11-29 RX ADMIN — FLUTICASONE PROPIONATE 2 SPRAY: 50 SPRAY, METERED NASAL at 08:38

## 2018-11-29 RX ADMIN — METOPROLOL TARTRATE 100 MG: 50 TABLET ORAL at 08:38

## 2018-11-29 RX ADMIN — METOPROLOL TARTRATE 100 MG: 50 TABLET ORAL at 20:47

## 2018-11-29 RX ADMIN — CEFEPIME 2 G: 2 INJECTION, POWDER, FOR SOLUTION INTRAMUSCULAR; INTRAVENOUS at 16:17

## 2018-11-29 RX ADMIN — ALBUTEROL SULFATE 2 PUFF: 90 AEROSOL, METERED RESPIRATORY (INHALATION) at 20:33

## 2018-11-29 RX ADMIN — PANTOPRAZOLE SODIUM 40 MG: 40 TABLET, DELAYED RELEASE ORAL at 06:13

## 2018-11-29 RX ADMIN — INSULIN LISPRO 12 UNITS: 100 INJECTION, SOLUTION INTRAVENOUS; SUBCUTANEOUS at 08:39

## 2018-11-29 RX ADMIN — INSULIN LISPRO 9 UNITS: 100 INJECTION, SOLUTION INTRAVENOUS; SUBCUTANEOUS at 20:47

## 2018-11-29 RX ADMIN — INSULIN LISPRO 6 UNITS: 100 INJECTION, SOLUTION INTRAVENOUS; SUBCUTANEOUS at 17:20

## 2018-11-29 RX ADMIN — GABAPENTIN 400 MG: 400 CAPSULE ORAL at 13:53

## 2018-11-29 RX ADMIN — Medication 2000 MG: at 00:29

## 2018-11-29 RX ADMIN — TERBINAFINE HYDROCHLORIDE: 1 CREAM TOPICAL at 08:39

## 2018-11-29 RX ADMIN — OXYCODONE AND ACETAMINOPHEN 1 TABLET: 10; 325 TABLET ORAL at 01:37

## 2018-11-29 RX ADMIN — FENOFIBRATE 160 MG: 160 TABLET ORAL at 08:37

## 2018-11-29 RX ADMIN — Medication 10 ML: at 08:38

## 2018-11-29 RX ADMIN — INSULIN LISPRO 12 UNITS: 100 INJECTION, SOLUTION INTRAVENOUS; SUBCUTANEOUS at 16:53

## 2018-11-29 RX ADMIN — GABAPENTIN 400 MG: 400 CAPSULE ORAL at 08:37

## 2018-11-29 RX ADMIN — TORSEMIDE 20 MG: 20 TABLET ORAL at 08:37

## 2018-11-29 RX ADMIN — FERROUS SULFATE TAB 325 MG (65 MG ELEMENTAL FE) 325 MG: 325 (65 FE) TAB at 08:37

## 2018-11-29 RX ADMIN — CETIRIZINE HYDROCHLORIDE 10 MG: 10 TABLET, FILM COATED ORAL at 08:37

## 2018-11-29 RX ADMIN — MONTELUKAST SODIUM 10 MG: 10 TABLET, FILM COATED ORAL at 20:47

## 2018-11-29 RX ADMIN — ESCITALOPRAM OXALATE 20 MG: 10 TABLET ORAL at 08:36

## 2018-11-29 RX ADMIN — MOMETASONE FUROATE AND FORMOTEROL FUMARATE DIHYDRATE 2 PUFF: 200; 5 AEROSOL RESPIRATORY (INHALATION) at 20:33

## 2018-11-29 RX ADMIN — SPIRONOLACTONE 25 MG: 25 TABLET ORAL at 08:37

## 2018-11-29 RX ADMIN — TERBINAFINE HYDROCHLORIDE: 1 CREAM TOPICAL at 20:45

## 2018-11-29 RX ADMIN — OXYCODONE AND ACETAMINOPHEN 1 TABLET: 10; 325 TABLET ORAL at 20:47

## 2018-11-29 RX ADMIN — APIXABAN 5 MG: 5 TABLET, FILM COATED ORAL at 20:52

## 2018-11-29 RX ADMIN — OXYCODONE AND ACETAMINOPHEN 1 TABLET: 10; 325 TABLET ORAL at 12:24

## 2018-11-29 RX ADMIN — TRAZODONE HYDROCHLORIDE 50 MG: 50 TABLET ORAL at 20:47

## 2018-11-29 RX ADMIN — DILTIAZEM HYDROCHLORIDE 240 MG: 240 CAPSULE, COATED, EXTENDED RELEASE ORAL at 08:39

## 2018-11-29 ASSESSMENT — PAIN DESCRIPTION - PROGRESSION
CLINICAL_PROGRESSION: NOT CHANGED
CLINICAL_PROGRESSION: NOT CHANGED

## 2018-11-29 ASSESSMENT — PAIN DESCRIPTION - ORIENTATION
ORIENTATION: LOWER
ORIENTATION: LOWER

## 2018-11-29 ASSESSMENT — PAIN SCALES - GENERAL
PAINLEVEL_OUTOF10: 3
PAINLEVEL_OUTOF10: 5
PAINLEVEL_OUTOF10: 6
PAINLEVEL_OUTOF10: 7
PAINLEVEL_OUTOF10: 6
PAINLEVEL_OUTOF10: 6
PAINLEVEL_OUTOF10: 3

## 2018-11-29 ASSESSMENT — PAIN DESCRIPTION - FREQUENCY
FREQUENCY: CONTINUOUS

## 2018-11-29 ASSESSMENT — PAIN DESCRIPTION - PAIN TYPE
TYPE: CHRONIC PAIN

## 2018-11-29 ASSESSMENT — PAIN DESCRIPTION - ONSET
ONSET: ON-GOING
ONSET: ON-GOING

## 2018-11-29 ASSESSMENT — PAIN DESCRIPTION - LOCATION
LOCATION: LEG

## 2018-11-29 ASSESSMENT — ENCOUNTER SYMPTOMS: SHORTNESS OF BREATH: 0

## 2018-11-29 ASSESSMENT — PAIN DESCRIPTION - DESCRIPTORS
DESCRIPTORS: ACHING;DISCOMFORT;SORE
DESCRIPTORS: ACHING;DULL;SHOOTING
DESCRIPTORS: CRAMPING;ACHING;DISCOMFORT

## 2018-11-29 NOTE — CONSULTS
left ankle. 6.  Trauma, left foot, left ankle. PLAN:  1. Evaluation and management. 2.  Educate. X-rays of left foot, left ankle to help evaluate, manage,  and treat accordingly. This is due to the pain on palpation and  ecchymosis noted, specifically on the lateral aspect of her left heel,  lateral foot region. With normal sterile saline, apply Aquacel Ag, 4x4,  Papi, ABD, and Coban daily, distal to proximal direction, left leg. Rest, elevate, offload, left leg, left foot. Continue to elevate also  the right leg. Elevate heels bilateral.  Wound culture is needed of  left leg to help, evaluate, manage, and treat accordingly. Treatment  _____ excisional debridement with biopsy, left leg. Possible surgery  tomorrow. Possible n.p.o. at midnight, and we will follow.         Lety Dawson DPM    D: 11/29/2018 5:50:22       T: 11/29/2018 7:42:23     [email or web address]  Job#: [id number]     Doc#: [id number]    CC:

## 2018-11-29 NOTE — PROGRESS NOTES
97.5 °F (36.4 °C) (Oral)   Resp 20   Ht 5' 3\" (1.6 m)   Wt (!) 305 lb (138.3 kg)   LMP  (LMP Unknown)   SpO2 100%   BMI 54.03 kg/m²   General Appearance: alert and oriented to person, place and time, frail-appearing and obese  Skin: Erythema with raised, bumpy, indurated, rachel feeling skin around circumferentially in the left lower leg with pain on palpation and discoloration of the foot as well, increased erythema up to the thighs as well without the irregularly shaped lesions on the legs  Pulmonary/Chest: clear to auscultation bilaterally- no wheezes, rales or rhonchi, normal air movement, no respiratory distress  Cardiovascular: normal rate, regular rhythm, normal S1 and S2, no gallops and intact distal pulses  Abdomen: soft, non-tender, non-distended and no organomegaly  Musculoskeletal: no joint deformity and no tender joints  Neurologic: reflexes normal and symmetric, no cranial nerve deficit and speech normal      Recent Labs      11/27/18   1432  11/28/18   0820   NA  134  134   K  4.6  4.7   CL  93*  96*   CO2  31*  29   BUN  22  16   CREATININE  0.9  0.8   GLUCOSE  237*  391*   CALCIUM  9.1  8.4*       Recent Labs      11/27/18   1432  11/28/18   0820  11/29/18   0500   WBC  14.0*  12.5*  14.1*   RBC  3.57  3.23*  3.43*   HGB  8.5*  7.6*  8.0*   HCT  28.9*  26.5*  27.6*   MCV  81.0  82.0  80.5   MCH  23.8*  23.5*  23.3*   MCHC  29.4*  28.7*  29.0*   RDW  17.6*  17.6*  17.5*   PLT  396  376  407   MPV  9.4  9.3  9.5       CBC with Differential:    Lab Results   Component Value Date    WBC 14.1 11/29/2018    RBC 3.43 11/29/2018    HGB 8.0 11/29/2018    HCT 27.6 11/29/2018     11/29/2018    MCV 80.5 11/29/2018    MCH 23.3 11/29/2018    MCHC 29.0 11/29/2018    RDW 17.5 11/29/2018    SEGSPCT 85 01/29/2014    LYMPHOPCT 11.5 11/29/2018    MONOPCT 5.5 11/29/2018    BASOPCT 0.3 11/29/2018    MONOSABS 0.78 11/29/2018    LYMPHSABS 1.62 11/29/2018    EOSABS 0.20 11/29/2018    BASOSABS 0.04 11/29/2018

## 2018-11-29 NOTE — PROGRESS NOTES
Call placed to Dr. Alla Weiss regarding pt concerns not getting enough insulin. He will review and adjust orders.

## 2018-11-29 NOTE — PROGRESS NOTES
Physical Therapy    Facility/Department: [facility] MED SURG  Treatment note    NAME: Ebenezer Payton  : 1955  MRN: [id number]    Date of Service: 2018      Patient Diagnosis(es): The primary encounter diagnosis was Septicemia [facility]). A diagnosis of Left leg cellulitis was also pertinent to this visit. has a past medical history of Anal fissure; Anemia; Anxiety and depression; Arthritis; Asthma; Atrial fibrillation ([facility] Blood transfusion; CHF (congestive heart failure) ([facility] [address] COPD (chronic obstructive pulmonary disease) ([facility] [address] [facility] Disc disorder; DM2 (diabetes mellitus, type 2) ([facility] [facility] Fall due to stumbling; GERD (gastroesophageal reflux disease); Hx of blood clots; Hyperlipidemia; Hypertension; Inappropriate sinus tachycardia; LVH (left ventricular hypertrophy); Lymphadenitis, chronic; Occipital neuralgia; Respiratory acidosis; and Sinus tachycardia. has a past surgical history that includes Tonsillectomy (); Appendectomy ();  section ();  section ();  section (); Anus surgery (2006); Upper gastrointestinal endoscopy (2004); Colonoscopy (2004); Colonoscopy (2006); anoscopy (10/25/2006); anoscopy (2007); anoscopy (2007); Colonoscopy (3/23/2012); Anus surgery (2012); joint replacement (); Upper gastrointestinal endoscopy (N/A, 2018); and Upper gastrointestinal endoscopy (N/A, 2018). Evaluating Therapist: Tariq Davis PT         Room #: 553  DIAGNOSIS: sepsis  PRECAUTIONS: falls     Social:  Pt lives with daughter  and grandson in a 2 floor plan 4 steps and 1 rail to enter and her bed and bath are on first floor. Reports her daughter helps her on steps   Prior to admission pt walked with a rollator ww and uses 4L home O2. Sleeps in recliner                  Initial Evaluation  Date: 18 Treatment    18  Short Term/ Long Term   Goals   Was pt agreeable to Eval/treatment? yes yes      Does pt have pain?  L LE

## 2018-11-29 NOTE — CONSULTS
cefepime  2 g Intravenous Q12H    insulin lispro  8 Units Subcutaneous TID WC    terbinafine   Topical BID    apixaban  5 mg Oral BID    diltiazem  240 mg Oral BID    mometasone-formoterol  2 puff Inhalation BID    escitalopram  20 mg Oral QAM    fenofibrate  160 mg Oral Daily    ferrous sulfate  325 mg Oral Daily with breakfast    cetirizine  10 mg Oral Daily    fluticasone  2 spray Nasal Daily    gabapentin  400 mg Oral TID    pantoprazole  40 mg Oral QAM AC    metoprolol  100 mg Oral BID    montelukast  10 mg Oral Nightly    spironolactone  25 mg Oral Daily    torsemide  20 mg Oral Daily    traZODone  50 mg Oral Nightly    sodium chloride flush  10 mL Intravenous 2 times per day        Allergies:  Statins    Social History     Social History    Marital status: Single     Spouse name: N/A    Number of children: 3    Years of education: 9     Occupational History    SSD      Social History Main Topics    Smoking status: Former Smoker     Packs/day: 1.50     Years: 25.00     Types: Cigarettes     Start date: 11/27/1979     Quit date: 12/18/2004    Smokeless tobacco: Never Used    Alcohol use No      Comment: denies caffeine    Drug use: No    Sexual activity: No     Other Topics Concern    Not on file     Social History Narrative    No narrative on file        Family History   Problem Relation Age of Onset    Heart Disease Mother     Diabetes Father     Colon Cancer Father     Other Father         BLINDNESS    Colon Cancer Sister     Diabetes Paternal Aunt     Diabetes Paternal Uncle     Diabetes Paternal Aunt     Diabetes Paternal Uncle        REVIEW OF SYSTEMS:      Eyes:      Blurred vision:  No [x]/Yes []               Diplopia:   No [x]/Yes []               Vision loss:       No [x]/Yes []   Ears, nose, throat:             Hearing loss:    No [x]/Yes []      Vertigo:   No [x]/Yes []                       Swallowing problem:  No [x]/Yes []               Nose bleeds:   No regular rhythm  Abdomen: non-distended  Musculoskeletal: normal range of motion, no joint deformity or tenderness  Neurologic: no cranial nerve deficit, speech normal  Extremities: leg edema bilaterally, feet are warm and toes are pink with normal capillary refill. Unable to appreciate ankle pulses due to swelling.     PULSE EXAM      Right      Left   Brachial     Radial     Femoral     Popliteal     Dorsalis Pedis     Posterior Tibial     (3=normal, 2=diminished, 1=barely palpable, 4=widened)      LABS:    Lab Results   Component Value Date    WBC 14.1 (H) 11/29/2018    HGB 8.0 (L) 11/29/2018    HCT 27.6 (L) 11/29/2018     11/29/2018    PROTIME 16.4 (H) 11/27/2018    INR 1.5 11/27/2018    K 4.7 11/28/2018    BUN 16 11/28/2018    CREATININE 0.8 11/28/2018       RADIOLOGY:

## 2018-11-29 NOTE — PROGRESS NOTES
with SUP. Pt required CGA- Min A for balance while standing at sink due to one episode of LOB secondary to posterior lean. Pt was instructed to reach for arm rests of chair during stand to sit transfers. Therapeutic Exercises:  Pt demoed good use of B UE during transfers and functional mobility. Other:  Extended time to perform tasks was required due to decreased endurance. Pt appeared impulsive periodically requiring vc for safety from therapist.  Transfers improved from eval.    Education:  Safe transfer techniques. Equipment Recommendations:  Continue to assess    AM-PAC Inpatient Daily Activity Raw Score:  16/24    Pain Level: No complaints of pain   Additional Notes:  Poor activity tolerance. Patient has made good progress during treatment sessions toward set goals. [x] Continue with current OT Plan of care.   [] Prepare for Discharge    Jeramie PARIKH/ANIA [id number]    Total Tx Time: 15

## 2018-11-29 NOTE — PROGRESS NOTES
P Quality Flow/Interdisciplinary Rounds Progress Note        Quality Flow Rounds held on November 29, 2018    Disciplines Attending:  Bedside Nurse, ,  and Nursing Unit 7019 Harish was admitted on 11/27/2018  1:06 PM    Anticipated Discharge Date:  Expected Discharge Date: 11/30/18    Disposition:    Kavon Score:  Kavon Scale Score: 19    Readmission Risk              Risk of Unplanned Readmission:        39           Discussed patient goal for the day, patient clinical progression, and barriers to discharge.   The following Goal(s) of the Day/Commitment(s) have been identified:  tx general floor      [facility]  November 29, 2018

## 2018-11-30 ENCOUNTER — HOSPITAL ENCOUNTER (OUTPATIENT)
Dept: OTHER | Age: 63
Setting detail: THERAPIES SERIES
Discharge: HOME | End: 2018-11-30
Payer: COMMERCIAL

## 2018-11-30 ENCOUNTER — ANESTHESIA (OUTPATIENT)
Dept: OPERATING ROOM | Age: 63
DRG: 720 | End: 2018-11-30
Payer: COMMERCIAL

## 2018-11-30 VITALS — DIASTOLIC BLOOD PRESSURE: 71 MMHG | OXYGEN SATURATION: 100 % | SYSTOLIC BLOOD PRESSURE: 159 MMHG

## 2018-11-30 LAB
METER GLUCOSE: 257 MG/DL (ref 74–99)
METER GLUCOSE: 266 MG/DL (ref 74–99)
METER GLUCOSE: 299 MG/DL (ref 74–99)
METER GLUCOSE: 315 MG/DL (ref 74–99)

## 2018-11-30 PROCEDURE — 87077 CULTURE AEROBIC IDENTIFY: CPT

## 2018-11-30 PROCEDURE — 87116 MYCOBACTERIA CULTURE: CPT

## 2018-11-30 PROCEDURE — 3700000001 HC ADD 15 MINUTES (ANESTHESIA): Performed by: PODIATRIST

## 2018-11-30 PROCEDURE — 6370000000 HC RX 637 (ALT 250 FOR IP): Performed by: INTERNAL MEDICINE

## 2018-11-30 PROCEDURE — 1200000000 HC SEMI PRIVATE

## 2018-11-30 PROCEDURE — 6360000002 HC RX W HCPCS: Performed by: INTERNAL MEDICINE

## 2018-11-30 PROCEDURE — 99232 SBSQ HOSP IP/OBS MODERATE 35: CPT | Performed by: INTERNAL MEDICINE

## 2018-11-30 PROCEDURE — 87015 SPECIMEN INFECT AGNT CONCNTJ: CPT

## 2018-11-30 PROCEDURE — 87070 CULTURE OTHR SPECIMN AEROBIC: CPT

## 2018-11-30 PROCEDURE — 0JBP0ZZ EXCISION OF LEFT LOWER LEG SUBCUTANEOUS TISSUE AND FASCIA, OPEN APPROACH: ICD-10-PCS | Performed by: PODIATRIST

## 2018-11-30 PROCEDURE — 2580000003 HC RX 258: Performed by: INTERNAL MEDICINE

## 2018-11-30 PROCEDURE — 3700000000 HC ANESTHESIA ATTENDED CARE: Performed by: PODIATRIST

## 2018-11-30 PROCEDURE — 87205 SMEAR GRAM STAIN: CPT

## 2018-11-30 PROCEDURE — 6360000002 HC RX W HCPCS: Performed by: NURSE ANESTHETIST, CERTIFIED REGISTERED

## 2018-11-30 PROCEDURE — 87102 FUNGUS ISOLATION CULTURE: CPT

## 2018-11-30 PROCEDURE — 87206 SMEAR FLUORESCENT/ACID STAI: CPT

## 2018-11-30 PROCEDURE — 2700000000 HC OXYGEN THERAPY PER DAY

## 2018-11-30 PROCEDURE — 7100000010 HC PHASE II RECOVERY - FIRST 15 MIN: Performed by: PODIATRIST

## 2018-11-30 PROCEDURE — 88304 TISSUE EXAM BY PATHOLOGIST: CPT

## 2018-11-30 PROCEDURE — 3600000012 HC SURGERY LEVEL 2 ADDTL 15MIN: Performed by: PODIATRIST

## 2018-11-30 PROCEDURE — 2580000003 HC RX 258: Performed by: NURSE ANESTHETIST, CERTIFIED REGISTERED

## 2018-11-30 PROCEDURE — 87176 TISSUE HOMOGENIZATION CULTR: CPT

## 2018-11-30 PROCEDURE — 94640 AIRWAY INHALATION TREATMENT: CPT

## 2018-11-30 PROCEDURE — 82962 GLUCOSE BLOOD TEST: CPT

## 2018-11-30 PROCEDURE — 87075 CULTR BACTERIA EXCEPT BLOOD: CPT

## 2018-11-30 PROCEDURE — 2709999900 HC NON-CHARGEABLE SUPPLY: Performed by: PODIATRIST

## 2018-11-30 PROCEDURE — 0HBLXZX EXCISION OF LEFT LOWER LEG SKIN, EXTERNAL APPROACH, DIAGNOSTIC: ICD-10-PCS | Performed by: PODIATRIST

## 2018-11-30 PROCEDURE — 6370000000 HC RX 637 (ALT 250 FOR IP): Performed by: HOSPITALIST

## 2018-11-30 PROCEDURE — 6360000002 HC RX W HCPCS: Performed by: ANESTHESIOLOGY

## 2018-11-30 PROCEDURE — 6360000002 HC RX W HCPCS

## 2018-11-30 PROCEDURE — 7100000011 HC PHASE II RECOVERY - ADDTL 15 MIN: Performed by: PODIATRIST

## 2018-11-30 PROCEDURE — 87186 SC STD MICRODIL/AGAR DIL: CPT

## 2018-11-30 PROCEDURE — 87147 CULTURE TYPE IMMUNOLOGIC: CPT

## 2018-11-30 PROCEDURE — 3600000002 HC SURGERY LEVEL 2 BASE: Performed by: PODIATRIST

## 2018-11-30 RX ORDER — FENTANYL CITRATE 50 UG/ML
INJECTION, SOLUTION INTRAMUSCULAR; INTRAVENOUS
Status: COMPLETED
Start: 2018-11-30 | End: 2018-11-30

## 2018-11-30 RX ORDER — SODIUM CHLORIDE 9 MG/ML
INJECTION, SOLUTION INTRAVENOUS CONTINUOUS PRN
Status: DISCONTINUED | OUTPATIENT
Start: 2018-11-30 | End: 2018-11-30 | Stop reason: SDUPTHER

## 2018-11-30 RX ORDER — PROPOFOL 10 MG/ML
INJECTION, EMULSION INTRAVENOUS CONTINUOUS PRN
Status: DISCONTINUED | OUTPATIENT
Start: 2018-11-30 | End: 2018-11-30 | Stop reason: SDUPTHER

## 2018-11-30 RX ORDER — SODIUM CHLORIDE 0.9 % (FLUSH) 0.9 %
10 SYRINGE (ML) INJECTION PRN
Status: DISCONTINUED | OUTPATIENT
Start: 2018-11-30 | End: 2018-11-30

## 2018-11-30 RX ORDER — SODIUM CHLORIDE 0.9 % (FLUSH) 0.9 %
10 SYRINGE (ML) INJECTION EVERY 12 HOURS SCHEDULED
Status: DISCONTINUED | OUTPATIENT
Start: 2018-11-30 | End: 2018-11-30

## 2018-11-30 RX ORDER — MIDAZOLAM HYDROCHLORIDE 1 MG/ML
INJECTION INTRAMUSCULAR; INTRAVENOUS PRN
Status: DISCONTINUED | OUTPATIENT
Start: 2018-11-30 | End: 2018-11-30 | Stop reason: SDUPTHER

## 2018-11-30 RX ORDER — FENTANYL CITRATE 50 UG/ML
INJECTION, SOLUTION INTRAMUSCULAR; INTRAVENOUS PRN
Status: DISCONTINUED | OUTPATIENT
Start: 2018-11-30 | End: 2018-11-30 | Stop reason: SDUPTHER

## 2018-11-30 RX ORDER — FENTANYL CITRATE 50 UG/ML
50 INJECTION, SOLUTION INTRAMUSCULAR; INTRAVENOUS EVERY 5 MIN PRN
Status: COMPLETED | OUTPATIENT
Start: 2018-11-30 | End: 2018-11-30

## 2018-11-30 RX ADMIN — OXYCODONE AND ACETAMINOPHEN 1 TABLET: 10; 325 TABLET ORAL at 06:20

## 2018-11-30 RX ADMIN — TRAZODONE HYDROCHLORIDE 50 MG: 50 TABLET ORAL at 20:42

## 2018-11-30 RX ADMIN — FENTANYL CITRATE 50 MCG: 50 INJECTION, SOLUTION INTRAMUSCULAR; INTRAVENOUS at 15:43

## 2018-11-30 RX ADMIN — OXYCODONE AND ACETAMINOPHEN 1 TABLET: 10; 325 TABLET ORAL at 16:01

## 2018-11-30 RX ADMIN — INSULIN LISPRO 8 UNITS: 100 INJECTION, SOLUTION INTRAVENOUS; SUBCUTANEOUS at 17:17

## 2018-11-30 RX ADMIN — OXYCODONE AND ACETAMINOPHEN 1 TABLET: 10; 325 TABLET ORAL at 20:41

## 2018-11-30 RX ADMIN — Medication 2000 MG: at 09:06

## 2018-11-30 RX ADMIN — APIXABAN 5 MG: 5 TABLET, FILM COATED ORAL at 20:41

## 2018-11-30 RX ADMIN — INSULIN LISPRO 9 UNITS: 100 INJECTION, SOLUTION INTRAVENOUS; SUBCUTANEOUS at 17:17

## 2018-11-30 RX ADMIN — INSULIN LISPRO 4 UNITS: 100 INJECTION, SOLUTION INTRAVENOUS; SUBCUTANEOUS at 08:17

## 2018-11-30 RX ADMIN — MOMETASONE FUROATE AND FORMOTEROL FUMARATE DIHYDRATE 2 PUFF: 200; 5 AEROSOL RESPIRATORY (INHALATION) at 07:49

## 2018-11-30 RX ADMIN — DILTIAZEM HYDROCHLORIDE 240 MG: 240 CAPSULE, COATED, EXTENDED RELEASE ORAL at 20:42

## 2018-11-30 RX ADMIN — MOMETASONE FUROATE AND FORMOTEROL FUMARATE DIHYDRATE 2 PUFF: 200; 5 AEROSOL RESPIRATORY (INHALATION) at 20:47

## 2018-11-30 RX ADMIN — INSULIN LISPRO 6 UNITS: 100 INJECTION, SOLUTION INTRAVENOUS; SUBCUTANEOUS at 21:01

## 2018-11-30 RX ADMIN — OXYCODONE AND ACETAMINOPHEN 1 TABLET: 10; 325 TABLET ORAL at 02:10

## 2018-11-30 RX ADMIN — ALBUTEROL SULFATE 2 PUFF: 90 AEROSOL, METERED RESPIRATORY (INHALATION) at 07:49

## 2018-11-30 RX ADMIN — PROPOFOL 100 MCG/KG/MIN: 10 INJECTION, EMULSION INTRAVENOUS at 14:32

## 2018-11-30 RX ADMIN — DILTIAZEM HYDROCHLORIDE 240 MG: 240 CAPSULE, COATED, EXTENDED RELEASE ORAL at 08:13

## 2018-11-30 RX ADMIN — FENTANYL CITRATE 50 MCG: 50 INJECTION, SOLUTION INTRAMUSCULAR; INTRAVENOUS at 14:29

## 2018-11-30 RX ADMIN — FENTANYL CITRATE 50 MCG: 50 INJECTION, SOLUTION INTRAMUSCULAR; INTRAVENOUS at 14:32

## 2018-11-30 RX ADMIN — TERBINAFINE HYDROCHLORIDE: 1 CREAM TOPICAL at 08:14

## 2018-11-30 RX ADMIN — INSULIN LISPRO 4 UNITS: 100 INJECTION, SOLUTION INTRAVENOUS; SUBCUTANEOUS at 08:21

## 2018-11-30 RX ADMIN — Medication 2000 MG: at 20:51

## 2018-11-30 RX ADMIN — CEFEPIME 2 G: 2 INJECTION, POWDER, FOR SOLUTION INTRAMUSCULAR; INTRAVENOUS at 17:11

## 2018-11-30 RX ADMIN — INSULIN GLARGINE 100 UNITS: 100 INJECTION, SOLUTION SUBCUTANEOUS at 21:00

## 2018-11-30 RX ADMIN — Medication 10 ML: at 03:55

## 2018-11-30 RX ADMIN — SPIRONOLACTONE 25 MG: 25 TABLET ORAL at 08:13

## 2018-11-30 RX ADMIN — FENTANYL CITRATE 50 MCG: 50 INJECTION, SOLUTION INTRAMUSCULAR; INTRAVENOUS at 15:47

## 2018-11-30 RX ADMIN — MONTELUKAST SODIUM 10 MG: 10 TABLET, FILM COATED ORAL at 20:42

## 2018-11-30 RX ADMIN — Medication 10 ML: at 20:52

## 2018-11-30 RX ADMIN — METOPROLOL TARTRATE 100 MG: 50 TABLET ORAL at 20:41

## 2018-11-30 RX ADMIN — MIDAZOLAM HYDROCHLORIDE 2 MG: 1 INJECTION, SOLUTION INTRAMUSCULAR; INTRAVENOUS at 14:27

## 2018-11-30 RX ADMIN — FLUTICASONE PROPIONATE 2 SPRAY: 50 SPRAY, METERED NASAL at 08:14

## 2018-11-30 RX ADMIN — SODIUM CHLORIDE: 9 INJECTION, SOLUTION INTRAVENOUS at 14:15

## 2018-11-30 RX ADMIN — METOPROLOL TARTRATE 100 MG: 50 TABLET ORAL at 08:13

## 2018-11-30 RX ADMIN — TERBINAFINE HYDROCHLORIDE: 1 CREAM TOPICAL at 20:51

## 2018-11-30 RX ADMIN — GABAPENTIN 400 MG: 400 CAPSULE ORAL at 20:41

## 2018-11-30 RX ADMIN — CEFEPIME 2 G: 2 INJECTION, POWDER, FOR SOLUTION INTRAMUSCULAR; INTRAVENOUS at 03:54

## 2018-11-30 ASSESSMENT — PAIN SCALES - GENERAL
PAINLEVEL_OUTOF10: 9
PAINLEVEL_OUTOF10: 10
PAINLEVEL_OUTOF10: 7
PAINLEVEL_OUTOF10: 3
PAINLEVEL_OUTOF10: 6
PAINLEVEL_OUTOF10: 10
PAINLEVEL_OUTOF10: 0
PAINLEVEL_OUTOF10: 4
PAINLEVEL_OUTOF10: 9

## 2018-11-30 ASSESSMENT — PULMONARY FUNCTION TESTS
PIF_VALUE: 1
PIF_VALUE: 1
PIF_VALUE: 0
PIF_VALUE: 1
PIF_VALUE: 0
PIF_VALUE: 1
PIF_VALUE: 0
PIF_VALUE: 0
PIF_VALUE: 1

## 2018-11-30 ASSESSMENT — PAIN DESCRIPTION - PROGRESSION
CLINICAL_PROGRESSION: NOT CHANGED
CLINICAL_PROGRESSION: NOT CHANGED

## 2018-11-30 ASSESSMENT — PAIN DESCRIPTION - DESCRIPTORS
DESCRIPTORS: DISCOMFORT;DULL;ACHING
DESCRIPTORS: ACHING;DISCOMFORT;SORE

## 2018-11-30 ASSESSMENT — PAIN DESCRIPTION - LOCATION
LOCATION: LEG
LOCATION: LEG

## 2018-11-30 ASSESSMENT — PAIN DESCRIPTION - FREQUENCY: FREQUENCY: CONTINUOUS

## 2018-11-30 ASSESSMENT — PAIN DESCRIPTION - ORIENTATION: ORIENTATION: RIGHT;LEFT;LOWER

## 2018-11-30 ASSESSMENT — PAIN DESCRIPTION - PAIN TYPE
TYPE: ACUTE PAIN
TYPE: CHRONIC PAIN

## 2018-11-30 ASSESSMENT — PAIN DESCRIPTION - ONSET: ONSET: ON-GOING

## 2018-11-30 NOTE — PROGRESS NOTES
Jennfier Berry Hospitalist   Progress Note    Admitting Date and Time: 11/27/2018  1:06 PM  Admit Dx: Sepsis ([facility] [address] [A41.9]  Sepsis ([facility] [address] [facility] [A41.9]    Subjective:    Patient was admitted with Sepsis ([facility] [address] [A41.9]  Sepsis ([facility] [address] [facility] [A41.9]. Patient is in some pain post op. Just returned from I and D of left lower leg per podiatry. No other complaints at this time. ROS: denies fever, chills, cp, sob, n/v, HA unless stated above.      insulin glargine  100 Units Subcutaneous Nightly    insulin lispro  0-18 Units Subcutaneous TID WC    insulin lispro  0-9 Units Subcutaneous Nightly    vancomycin  2,000 mg Intravenous Q12H    cefepime  2 g Intravenous Q12H    insulin lispro  8 Units Subcutaneous TID WC    terbinafine   Topical BID    apixaban  5 mg Oral BID    diltiazem  240 mg Oral BID    mometasone-formoterol  2 puff Inhalation BID    escitalopram  20 mg Oral QAM    fenofibrate  160 mg Oral Daily    ferrous sulfate  325 mg Oral Daily with breakfast    cetirizine  10 mg Oral Daily    fluticasone  2 spray Nasal Daily    gabapentin  400 mg Oral TID    pantoprazole  40 mg Oral QAM AC    metoprolol  100 mg Oral BID    montelukast  10 mg Oral Nightly    spironolactone  25 mg Oral Daily    torsemide  20 mg Oral Daily    traZODone  50 mg Oral Nightly    sodium chloride flush  10 mL Intravenous 2 times per day       acetaminophen 650 mg Q4H PRN   albuterol sulfate HFA 2 puff Q4H PRN   ALPRAZolam 0.25 mg Nightly PRN   docusate sodium 100 mg TID PRN   oxyCODONE-acetaminophen 1 tablet Q4H PRN   sodium chloride flush 10 mL PRN   magnesium hydroxide 30 mL Daily PRN   ondansetron 4 mg Q6H PRN   glucose 15 g PRN   dextrose 12.5 g PRN   glucagon (rDNA) 1 mg PRN   dextrose 100 mL/hr PRN        Objective:    /67   Pulse 96   Temp 98.1 °F (36.7 °C) (Temporal)   Resp 20   Ht 5' 3\" (1.6 m)   Wt (!) 304 lb 3 oz (138 kg)   LMP  (LMP Unknown)   SpO2 97%   BMI 53.88 kg/m²   General Appearance:

## 2018-11-30 NOTE — BRIEF OP NOTE
Brief Postoperative Note  ______________________________________________________________    Patient: Tita Lemon  YOB: 1955  MRN: [id number]  Date of Procedure: 11/30/2018    Pre-Op Diagnosis: Painful Venous stasis ulcerations left leg. Cellulitis Left Leg    Post-Op Diagnosis: Same       Procedure(s):  LEFT LEG EXCISIONAL  DEBRIDEMENT WITH TISSUE BIOPSY    Anesthesia: Anesthesia type not filed in the log.     Surgeon(s):  Sera Celis DPM    Assistant: none    Estimated Blood Loss (mL): less than 50     Complications: None    Specimens: Tissue       Implants:  * No implants in log *      Drains:      Findings: Devitalized tissue    Sera Celis DPM  Date: 11/30/2018  Time: 9:42 AM

## 2018-11-30 NOTE — PROGRESS NOTES
P Quality Flow/Interdisciplinary Rounds Progress Note        Quality Flow Rounds held on November 30, 2018    Disciplines Attending:  Bedside Nurse, ,  and Nursing Unit 4869 Harish was admitted on 11/27/2018  1:06 PM    Anticipated Discharge Date:  Expected Discharge Date: 11/30/18    Disposition:    Kavon Score:  Kavon Scale Score: 19    Readmission Risk              Risk of Unplanned Readmission:        39           Discussed patient goal for the day, patient clinical progression, and barriers to discharge.   The following Goal(s) of the Day/Commitment(s) have been identified:  I&D w/ biopsies today      Felecia Valenzuela  November 30, 2018

## 2018-11-30 NOTE — PROGRESS NOTES
CC- B/L LE cellulitis    Subjective: The patient is awake and alert. Tolerating medications. Reports no side effects. Afebrile. 10 ROS otherwise negative unless otherwise specified above. Objective:    Vitals:    11/30/18 1250   BP: 132/62   Pulse: 82   Resp: 18   Temp: 98.1 °F (36.7 °C)   SpO2: 97%       General Appearance:    Awake, alert , no acute distress.    HEENT:    Normocephalic,PERRL,neck supple, no JVD, mucosa moist, no thrush   Lungs:     Clear to auscultation bilaterally, no wheeze , crackles   Heart:    Regular rate and rhythm, no murmur   Abdomen:     Soft, non-tender, not distended  bowel sounds present,     Extremities:   B/L LE lymphedema, redness left leg upto midthigh with oozing from the posterior leg, onycomycosis   Pulses:   2+ and symmetric all extremities   Skin:   Skin color, texture, turgor normal, no rashes or lesions         CBC+dif:  Reviewed and trend followed     Radiology:  Noted     Microbiology:  Wound cx- GNB     Assessment:     · LLE cellulitis  · B/L LE lymphedema  · onycomycosis     Plan:    · Iv vancomycin, cefepime day 3  · Terbinafine  · Podiatry planning I&D today  · Monitor labs- bmp tomorrow, vanc random level sunday  · Will follow with you             Electronically signed by Som Rowan MD on 11/30/2018 at 1:36 PM

## 2018-11-30 NOTE — PLAN OF CARE
Problem: Nutrition  Goal: Optimal nutrition therapy  Outcome: Ongoing  Nutrition Problem: Increased nutrient needs  Intervention: Food and/or Nutrient Delivery: Start oral diet, Start ONS (when medically appropriate)  Nutritional Goals: progression of nutrition

## 2018-12-01 LAB
ANION GAP SERPL CALCULATED.3IONS-SCNC: 8 MMOL/L (ref 7–16)
ANISOCYTOSIS: ABNORMAL
BASOPHILS ABSOLUTE: 0 E9/L (ref 0–0.2)
BASOPHILS RELATIVE PERCENT: 0 % (ref 0–2)
BUN BLDV-MCNC: 22 MG/DL (ref 8–23)
CALCIUM SERPL-MCNC: 9.1 MG/DL (ref 8.6–10.2)
CHLORIDE BLD-SCNC: 91 MMOL/L (ref 98–107)
CO2: 34 MMOL/L (ref 22–29)
CREAT SERPL-MCNC: 1 MG/DL (ref 0.5–1)
EOSINOPHILS ABSOLUTE: 0.25 E9/L (ref 0.05–0.5)
EOSINOPHILS RELATIVE PERCENT: 2 % (ref 0–6)
GFR AFRICAN AMERICAN: >60
GFR NON-AFRICAN AMERICAN: 56 ML/MIN/1.73
GLUCOSE BLD-MCNC: 357 MG/DL (ref 74–99)
GRAM STAIN ORDERABLE: NORMAL
HCT VFR BLD CALC: 27.7 % (ref 34–48)
HEMOGLOBIN: 8 G/DL (ref 11.5–15.5)
HYPOCHROMIA: ABNORMAL
LYMPHOCYTES ABSOLUTE: 1.65 E9/L (ref 1.5–4)
LYMPHOCYTES RELATIVE PERCENT: 13 % (ref 20–42)
MCH RBC QN AUTO: 23.5 PG (ref 26–35)
MCHC RBC AUTO-ENTMCNC: 28.9 % (ref 32–34.5)
MCV RBC AUTO: 81.5 FL (ref 80–99.9)
METAMYELOCYTES RELATIVE PERCENT: 1 % (ref 0–1)
METER GLUCOSE: 274 MG/DL (ref 74–99)
METER GLUCOSE: 376 MG/DL (ref 74–99)
METER GLUCOSE: 385 MG/DL (ref 74–99)
METER GLUCOSE: 403 MG/DL (ref 74–99)
MONOCYTES ABSOLUTE: 0.76 E9/L (ref 0.1–0.95)
MONOCYTES RELATIVE PERCENT: 6 % (ref 2–12)
MYELOCYTE PERCENT: 1 % (ref 0–0)
NEUTROPHILS ABSOLUTE: 10.03 E9/L (ref 1.8–7.3)
NEUTROPHILS RELATIVE PERCENT: 77 % (ref 43–80)
PDW BLD-RTO: 17.2 FL (ref 11.5–15)
PLATELET # BLD: 429 E9/L (ref 130–450)
PMV BLD AUTO: 9.1 FL (ref 7–12)
POLYCHROMASIA: ABNORMAL
POTASSIUM SERPL-SCNC: 5.7 MMOL/L (ref 3.5–5)
RBC # BLD: 3.4 E12/L (ref 3.5–5.5)
SODIUM BLD-SCNC: 133 MMOL/L (ref 132–146)
WBC # BLD: 12.7 E9/L (ref 4.5–11.5)

## 2018-12-01 PROCEDURE — 2700000000 HC OXYGEN THERAPY PER DAY

## 2018-12-01 PROCEDURE — 99232 SBSQ HOSP IP/OBS MODERATE 35: CPT | Performed by: INTERNAL MEDICINE

## 2018-12-01 PROCEDURE — 6370000000 HC RX 637 (ALT 250 FOR IP): Performed by: INTERNAL MEDICINE

## 2018-12-01 PROCEDURE — 80048 BASIC METABOLIC PNL TOTAL CA: CPT

## 2018-12-01 PROCEDURE — 82962 GLUCOSE BLOOD TEST: CPT

## 2018-12-01 PROCEDURE — 6360000002 HC RX W HCPCS: Performed by: INTERNAL MEDICINE

## 2018-12-01 PROCEDURE — 36415 COLL VENOUS BLD VENIPUNCTURE: CPT

## 2018-12-01 PROCEDURE — 1200000000 HC SEMI PRIVATE

## 2018-12-01 PROCEDURE — 6370000000 HC RX 637 (ALT 250 FOR IP): Performed by: HOSPITALIST

## 2018-12-01 PROCEDURE — 85025 COMPLETE CBC W/AUTO DIFF WBC: CPT

## 2018-12-01 PROCEDURE — 2580000003 HC RX 258: Performed by: INTERNAL MEDICINE

## 2018-12-01 RX ORDER — SODIUM POLYSTYRENE SULFONATE 15 G/60ML
15 SUSPENSION ORAL; RECTAL ONCE
Status: COMPLETED | OUTPATIENT
Start: 2018-12-01 | End: 2018-12-01

## 2018-12-01 RX ADMIN — TORSEMIDE 20 MG: 20 TABLET ORAL at 08:24

## 2018-12-01 RX ADMIN — MONTELUKAST SODIUM 10 MG: 10 TABLET, FILM COATED ORAL at 20:58

## 2018-12-01 RX ADMIN — APIXABAN 5 MG: 5 TABLET, FILM COATED ORAL at 08:19

## 2018-12-01 RX ADMIN — ALBUTEROL SULFATE 2 PUFF: 90 AEROSOL, METERED RESPIRATORY (INHALATION) at 20:22

## 2018-12-01 RX ADMIN — INSULIN LISPRO 15 UNITS: 100 INJECTION, SOLUTION INTRAVENOUS; SUBCUTANEOUS at 18:12

## 2018-12-01 RX ADMIN — CETIRIZINE HYDROCHLORIDE 10 MG: 10 TABLET, FILM COATED ORAL at 08:19

## 2018-12-01 RX ADMIN — OXYCODONE AND ACETAMINOPHEN 1 TABLET: 10; 325 TABLET ORAL at 03:27

## 2018-12-01 RX ADMIN — GABAPENTIN 400 MG: 400 CAPSULE ORAL at 14:28

## 2018-12-01 RX ADMIN — INSULIN LISPRO 15 UNITS: 100 INJECTION, SOLUTION INTRAVENOUS; SUBCUTANEOUS at 13:28

## 2018-12-01 RX ADMIN — Medication 10 ML: at 08:24

## 2018-12-01 RX ADMIN — CEFEPIME 2 G: 2 INJECTION, POWDER, FOR SOLUTION INTRAMUSCULAR; INTRAVENOUS at 04:34

## 2018-12-01 RX ADMIN — INSULIN LISPRO 15 UNITS: 100 INJECTION, SOLUTION INTRAVENOUS; SUBCUTANEOUS at 08:20

## 2018-12-01 RX ADMIN — APIXABAN 5 MG: 5 TABLET, FILM COATED ORAL at 20:58

## 2018-12-01 RX ADMIN — INSULIN LISPRO 35 UNITS: 100 INJECTION, SOLUTION INTRAVENOUS; SUBCUTANEOUS at 13:28

## 2018-12-01 RX ADMIN — SODIUM POLYSTYRENE SULFONATE 15 G: 15 SUSPENSION ORAL; RECTAL at 06:44

## 2018-12-01 RX ADMIN — INSULIN LISPRO 5 UNITS: 100 INJECTION, SOLUTION INTRAVENOUS; SUBCUTANEOUS at 21:00

## 2018-12-01 RX ADMIN — INSULIN GLARGINE 100 UNITS: 100 INJECTION, SOLUTION SUBCUTANEOUS at 20:59

## 2018-12-01 RX ADMIN — PANTOPRAZOLE SODIUM 40 MG: 40 TABLET, DELAYED RELEASE ORAL at 06:29

## 2018-12-01 RX ADMIN — GABAPENTIN 400 MG: 400 CAPSULE ORAL at 08:19

## 2018-12-01 RX ADMIN — DILTIAZEM HYDROCHLORIDE 240 MG: 240 CAPSULE, COATED, EXTENDED RELEASE ORAL at 08:19

## 2018-12-01 RX ADMIN — Medication 10 ML: at 21:01

## 2018-12-01 RX ADMIN — Medication 2000 MG: at 10:05

## 2018-12-01 RX ADMIN — GABAPENTIN 400 MG: 400 CAPSULE ORAL at 20:58

## 2018-12-01 RX ADMIN — MOMETASONE FUROATE AND FORMOTEROL FUMARATE DIHYDRATE 2 PUFF: 200; 5 AEROSOL RESPIRATORY (INHALATION) at 20:22

## 2018-12-01 RX ADMIN — FERROUS SULFATE TAB 325 MG (65 MG ELEMENTAL FE) 325 MG: 325 (65 FE) TAB at 08:19

## 2018-12-01 RX ADMIN — METOPROLOL TARTRATE 100 MG: 50 TABLET ORAL at 20:58

## 2018-12-01 RX ADMIN — FLUTICASONE PROPIONATE 2 SPRAY: 50 SPRAY, METERED NASAL at 08:23

## 2018-12-01 RX ADMIN — DILTIAZEM HYDROCHLORIDE 240 MG: 240 CAPSULE, COATED, EXTENDED RELEASE ORAL at 20:58

## 2018-12-01 RX ADMIN — CEFEPIME 2 G: 2 INJECTION, POWDER, FOR SOLUTION INTRAMUSCULAR; INTRAVENOUS at 16:24

## 2018-12-01 RX ADMIN — FENOFIBRATE 160 MG: 160 TABLET ORAL at 08:19

## 2018-12-01 RX ADMIN — TERBINAFINE HYDROCHLORIDE: 1 CREAM TOPICAL at 08:23

## 2018-12-01 RX ADMIN — ALPRAZOLAM 0.25 MG: 0.25 TABLET ORAL at 21:07

## 2018-12-01 RX ADMIN — TERBINAFINE HYDROCHLORIDE: 1 CREAM TOPICAL at 21:02

## 2018-12-01 RX ADMIN — INSULIN LISPRO 35 UNITS: 100 INJECTION, SOLUTION INTRAVENOUS; SUBCUTANEOUS at 18:11

## 2018-12-01 RX ADMIN — INSULIN LISPRO 8 UNITS: 100 INJECTION, SOLUTION INTRAVENOUS; SUBCUTANEOUS at 08:20

## 2018-12-01 RX ADMIN — Medication 2000 MG: at 21:07

## 2018-12-01 RX ADMIN — OXYCODONE AND ACETAMINOPHEN 1 TABLET: 10; 325 TABLET ORAL at 16:24

## 2018-12-01 RX ADMIN — ESCITALOPRAM OXALATE 20 MG: 10 TABLET ORAL at 08:19

## 2018-12-01 RX ADMIN — TRAZODONE HYDROCHLORIDE 50 MG: 50 TABLET ORAL at 20:58

## 2018-12-01 RX ADMIN — METOPROLOL TARTRATE 100 MG: 50 TABLET ORAL at 08:19

## 2018-12-01 RX ADMIN — OXYCODONE AND ACETAMINOPHEN 1 TABLET: 10; 325 TABLET ORAL at 20:58

## 2018-12-01 ASSESSMENT — PAIN DESCRIPTION - ORIENTATION
ORIENTATION: RIGHT;LEFT
ORIENTATION: LEFT;RIGHT
ORIENTATION: RIGHT;LEFT

## 2018-12-01 ASSESSMENT — PAIN SCALES - GENERAL
PAINLEVEL_OUTOF10: 5
PAINLEVEL_OUTOF10: 0
PAINLEVEL_OUTOF10: 6
PAINLEVEL_OUTOF10: 2
PAINLEVEL_OUTOF10: 7
PAINLEVEL_OUTOF10: 4

## 2018-12-01 ASSESSMENT — PAIN DESCRIPTION - ONSET
ONSET: ON-GOING
ONSET: ON-GOING

## 2018-12-01 ASSESSMENT — PAIN DESCRIPTION - PROGRESSION
CLINICAL_PROGRESSION: NOT CHANGED
CLINICAL_PROGRESSION: NOT CHANGED
CLINICAL_PROGRESSION: GRADUALLY WORSENING
CLINICAL_PROGRESSION: NOT CHANGED

## 2018-12-01 ASSESSMENT — PAIN DESCRIPTION - DESCRIPTORS
DESCRIPTORS: ACHING;CONSTANT;DISCOMFORT
DESCRIPTORS: ACHING;DISCOMFORT
DESCRIPTORS: ACHING;CONSTANT;DISCOMFORT
DESCRIPTORS: ACHING;CONSTANT;DISCOMFORT

## 2018-12-01 ASSESSMENT — PAIN DESCRIPTION - LOCATION
LOCATION: LEG

## 2018-12-01 ASSESSMENT — PAIN DESCRIPTION - FREQUENCY
FREQUENCY: CONTINUOUS

## 2018-12-01 ASSESSMENT — PAIN DESCRIPTION - PAIN TYPE
TYPE: ACUTE PAIN

## 2018-12-01 NOTE — PROGRESS NOTES
Jennifer Berry Hospitalist   Progress Note    Admitting Date and Time: 11/27/2018  1:06 PM  Admit Dx: Sepsis ([facility] [address] [A41.9]  [facility] [A[facility]]    Subjective:    Patient was admitted with Sepsis ([facility] [address] [A41.9]  [facility] [A41.[facility]]. Patient denies fever, chills, cp, sob, n/v. Pt c/o pain.  insulin lispro  35 Units Subcutaneous TID WC    insulin glargine  100 Units Subcutaneous Nightly    insulin lispro  0-18 Units Subcutaneous TID WC    insulin lispro  0-9 Units Subcutaneous Nightly    vancomycin  2,000 mg Intravenous Q12H    cefepime  2 g Intravenous Q12H    terbinafine   Topical BID    apixaban  5 mg Oral BID    diltiazem  240 mg Oral BID    mometasone-formoterol  2 puff Inhalation BID    escitalopram  20 mg Oral QAM    fenofibrate  160 mg Oral Daily    ferrous sulfate  325 mg Oral Daily with breakfast    cetirizine  10 mg Oral Daily    fluticasone  2 spray Nasal Daily    gabapentin  400 mg Oral TID    pantoprazole  40 mg Oral QAM AC    metoprolol  100 mg Oral BID    montelukast  10 mg Oral Nightly    spironolactone  25 mg Oral Daily    torsemide  20 mg Oral Daily    traZODone  50 mg Oral Nightly    sodium chloride flush  10 mL Intravenous 2 times per day       acetaminophen 650 mg Q4H PRN   albuterol sulfate HFA 2 puff Q4H PRN   ALPRAZolam 0.25 mg Nightly PRN   docusate sodium 100 mg TID PRN   oxyCODONE-acetaminophen 1 tablet Q4H PRN   sodium chloride flush 10 mL PRN   magnesium hydroxide 30 mL Daily PRN   ondansetron 4 mg Q6H PRN   glucose 15 g PRN   dextrose 12.5 g PRN   glucagon (rDNA) 1 mg PRN   dextrose 100 mL/hr PRN        Objective:    /74   Pulse 82   Temp 97.1 °F (36.2 °C) (Oral)   Resp 18   Ht 5' 3\" (1.6 m)   Wt (!) 308 lb (139.7 kg)   LMP  (LMP Unknown)   SpO2 96%   BMI 54.56 kg/m²   Skin: warm and dry, no rash.  Some erythema noted on left leg  Pulmonary/Chest: clear to auscultation bilaterally- no wheezes, rales or rhonchi, normal air movement, no

## 2018-12-01 NOTE — OP NOTE
stable. Toe perfusion in all digits of the left  foot. The following written and oral instructions were given:  Keep  dressing clean, dry, and intact. Rest, elevate, offload. Any problems  or questions, contact myself, Dr. Andreina Ortega, as soon as possible. Await  cultures and biopsy. We will follow Podiatry addendum; all indications,  Contraindications, risks, and complications were explained to the  patient. Such risks and complications include increased infection,  increased ulceration, septicemia, bacteremia, loss of limb, loss of  life, loss of function, cellulitis, hematoma, abscess formation, wound  dehiscence, etc.  The patient understood all the risks and complications  and wanted the surgery to proceed. Again, no guarantees or promises  were made concerning the outcome of the surgery.         Criss Meza DPM    D: 11/30/2018 15:29:00       T: 12/01/2018 2:19:27     [email or web address]  Job#: [id number]     Doc#: [id number]    CC:

## 2018-12-01 NOTE — PROGRESS NOTES
Patient's IV had pulled out when patient was getting up to bedside commode and Vanc was infusing. Vanc was about half empty at the time. 2 RNs attempted to insert new peripheral but patient's veins kept blowing. Called clinical manager to see if she could attempt insertion and she had attempted prior and said she is a hard stick. Advised to call Diann Marley to see if we could get an order for a PICC since patient has had to have multiple new IVs and she is a hard stick. Diann Marley said he is going to come up to the floor and attempt to insert a peripheral using an ultrasound and if unsuccessful will insert a central line.

## 2018-12-02 LAB
ANISOCYTOSIS: ABNORMAL
ANTISTREPTOLYSIN-O: 359 IU/ML (ref 0–200)
BASOPHILS ABSOLUTE: 0 E9/L (ref 0–0.2)
BASOPHILS RELATIVE PERCENT: 0 % (ref 0–2)
BLOOD CULTURE, ROUTINE: NORMAL
C-REACTIVE PROTEIN: 10.9 MG/DL (ref 0–0.4)
CULTURE, BLOOD 2: NORMAL
EOSINOPHILS ABSOLUTE: 0.2 E9/L (ref 0.05–0.5)
EOSINOPHILS RELATIVE PERCENT: 2 % (ref 0–6)
HCT VFR BLD CALC: 26.7 % (ref 34–48)
HEMOGLOBIN: 7.6 G/DL (ref 11.5–15.5)
HYPOCHROMIA: ABNORMAL
LYMPHOCYTES ABSOLUTE: 1.88 E9/L (ref 1.5–4)
LYMPHOCYTES RELATIVE PERCENT: 19 % (ref 20–42)
MCH RBC QN AUTO: 23.1 PG (ref 26–35)
MCHC RBC AUTO-ENTMCNC: 28.5 % (ref 32–34.5)
MCV RBC AUTO: 81.2 FL (ref 80–99.9)
METAMYELOCYTES RELATIVE PERCENT: 1 % (ref 0–1)
METER GLUCOSE: 207 MG/DL (ref 74–99)
METER GLUCOSE: 252 MG/DL (ref 74–99)
METER GLUCOSE: 268 MG/DL (ref 74–99)
METER GLUCOSE: 296 MG/DL (ref 74–99)
MONOCYTES ABSOLUTE: 0.3 E9/L (ref 0.1–0.95)
MONOCYTES RELATIVE PERCENT: 3 % (ref 2–12)
NEUTROPHILS ABSOLUTE: 7.52 E9/L (ref 1.8–7.3)
NEUTROPHILS RELATIVE PERCENT: 75 % (ref 43–80)
PDW BLD-RTO: 17.2 FL (ref 11.5–15)
PLATELET # BLD: 428 E9/L (ref 130–450)
PMV BLD AUTO: 9.1 FL (ref 7–12)
POLYCHROMASIA: ABNORMAL
RBC # BLD: 3.29 E12/L (ref 3.5–5.5)
SEDIMENTATION RATE, ERYTHROCYTE: 108 MM/HR (ref 0–20)
VANCOMYCIN RANDOM: 13 MCG/ML (ref 5–40)
WBC # BLD: 9.9 E9/L (ref 4.5–11.5)
WOUND/ABSCESS: NORMAL

## 2018-12-02 PROCEDURE — 94640 AIRWAY INHALATION TREATMENT: CPT

## 2018-12-02 PROCEDURE — 86060 ANTISTREPTOLYSIN O TITER: CPT

## 2018-12-02 PROCEDURE — 80202 ASSAY OF VANCOMYCIN: CPT

## 2018-12-02 PROCEDURE — 85025 COMPLETE CBC W/AUTO DIFF WBC: CPT

## 2018-12-02 PROCEDURE — 6370000000 HC RX 637 (ALT 250 FOR IP): Performed by: INTERNAL MEDICINE

## 2018-12-02 PROCEDURE — 1200000000 HC SEMI PRIVATE

## 2018-12-02 PROCEDURE — 99232 SBSQ HOSP IP/OBS MODERATE 35: CPT | Performed by: INTERNAL MEDICINE

## 2018-12-02 PROCEDURE — 36415 COLL VENOUS BLD VENIPUNCTURE: CPT

## 2018-12-02 PROCEDURE — 2580000003 HC RX 258: Performed by: INTERNAL MEDICINE

## 2018-12-02 PROCEDURE — 86140 C-REACTIVE PROTEIN: CPT

## 2018-12-02 PROCEDURE — 82962 GLUCOSE BLOOD TEST: CPT

## 2018-12-02 PROCEDURE — 6370000000 HC RX 637 (ALT 250 FOR IP): Performed by: PODIATRIST

## 2018-12-02 PROCEDURE — 6360000002 HC RX W HCPCS: Performed by: INTERNAL MEDICINE

## 2018-12-02 PROCEDURE — 85651 RBC SED RATE NONAUTOMATED: CPT

## 2018-12-02 RX ADMIN — FLUTICASONE PROPIONATE 2 SPRAY: 50 SPRAY, METERED NASAL at 08:53

## 2018-12-02 RX ADMIN — INSULIN LISPRO 35 UNITS: 100 INJECTION, SOLUTION INTRAVENOUS; SUBCUTANEOUS at 13:11

## 2018-12-02 RX ADMIN — INSULIN LISPRO 35 UNITS: 100 INJECTION, SOLUTION INTRAVENOUS; SUBCUTANEOUS at 18:24

## 2018-12-02 RX ADMIN — OXYCODONE AND ACETAMINOPHEN 1 TABLET: 10; 325 TABLET ORAL at 01:58

## 2018-12-02 RX ADMIN — INSULIN LISPRO 6 UNITS: 100 INJECTION, SOLUTION INTRAVENOUS; SUBCUTANEOUS at 18:24

## 2018-12-02 RX ADMIN — DOCUSATE SODIUM 100 MG: 100 CAPSULE, LIQUID FILLED ORAL at 08:48

## 2018-12-02 RX ADMIN — ALBUTEROL SULFATE 2 PUFF: 90 AEROSOL, METERED RESPIRATORY (INHALATION) at 20:35

## 2018-12-02 RX ADMIN — MOMETASONE FUROATE AND FORMOTEROL FUMARATE DIHYDRATE 2 PUFF: 200; 5 AEROSOL RESPIRATORY (INHALATION) at 08:01

## 2018-12-02 RX ADMIN — ALBUTEROL SULFATE 2 PUFF: 90 AEROSOL, METERED RESPIRATORY (INHALATION) at 08:01

## 2018-12-02 RX ADMIN — CEFEPIME 2 G: 2 INJECTION, POWDER, FOR SOLUTION INTRAMUSCULAR; INTRAVENOUS at 04:15

## 2018-12-02 RX ADMIN — GABAPENTIN 400 MG: 400 CAPSULE ORAL at 08:48

## 2018-12-02 RX ADMIN — TERBINAFINE HYDROCHLORIDE: 1 CREAM TOPICAL at 20:56

## 2018-12-02 RX ADMIN — TERBINAFINE HYDROCHLORIDE: 1 CREAM TOPICAL at 08:53

## 2018-12-02 RX ADMIN — METOPROLOL TARTRATE 100 MG: 50 TABLET ORAL at 08:48

## 2018-12-02 RX ADMIN — FERROUS SULFATE TAB 325 MG (65 MG ELEMENTAL FE) 325 MG: 325 (65 FE) TAB at 08:48

## 2018-12-02 RX ADMIN — APIXABAN 5 MG: 5 TABLET, FILM COATED ORAL at 20:51

## 2018-12-02 RX ADMIN — MOMETASONE FUROATE AND FORMOTEROL FUMARATE DIHYDRATE 2 PUFF: 200; 5 AEROSOL RESPIRATORY (INHALATION) at 20:35

## 2018-12-02 RX ADMIN — SPIRONOLACTONE 25 MG: 25 TABLET ORAL at 08:48

## 2018-12-02 RX ADMIN — OXYCODONE AND ACETAMINOPHEN 1 TABLET: 10; 325 TABLET ORAL at 13:38

## 2018-12-02 RX ADMIN — GABAPENTIN 400 MG: 400 CAPSULE ORAL at 14:10

## 2018-12-02 RX ADMIN — CEFEPIME 2 G: 2 INJECTION, POWDER, FOR SOLUTION INTRAMUSCULAR; INTRAVENOUS at 16:01

## 2018-12-02 RX ADMIN — OXYCODONE AND ACETAMINOPHEN 1 TABLET: 10; 325 TABLET ORAL at 20:50

## 2018-12-02 RX ADMIN — ALPRAZOLAM 0.25 MG: 0.25 TABLET ORAL at 22:17

## 2018-12-02 RX ADMIN — ACETAMINOPHEN 650 MG: 325 TABLET ORAL at 04:17

## 2018-12-02 RX ADMIN — Medication 10 ML: at 20:57

## 2018-12-02 RX ADMIN — Medication 10 ML: at 08:48

## 2018-12-02 RX ADMIN — DILTIAZEM HYDROCHLORIDE 240 MG: 240 CAPSULE, COATED, EXTENDED RELEASE ORAL at 08:48

## 2018-12-02 RX ADMIN — Medication 2000 MG: at 09:56

## 2018-12-02 RX ADMIN — MICONAZOLE NITRATE: 2 OINTMENT TOPICAL at 15:56

## 2018-12-02 RX ADMIN — INSULIN LISPRO 5 UNITS: 100 INJECTION, SOLUTION INTRAVENOUS; SUBCUTANEOUS at 20:53

## 2018-12-02 RX ADMIN — APIXABAN 5 MG: 5 TABLET, FILM COATED ORAL at 08:48

## 2018-12-02 RX ADMIN — GABAPENTIN 400 MG: 400 CAPSULE ORAL at 20:51

## 2018-12-02 RX ADMIN — ESCITALOPRAM OXALATE 20 MG: 10 TABLET ORAL at 08:48

## 2018-12-02 RX ADMIN — FENOFIBRATE 160 MG: 160 TABLET ORAL at 08:48

## 2018-12-02 RX ADMIN — INSULIN LISPRO 35 UNITS: 100 INJECTION, SOLUTION INTRAVENOUS; SUBCUTANEOUS at 08:50

## 2018-12-02 RX ADMIN — INSULIN LISPRO 9 UNITS: 100 INJECTION, SOLUTION INTRAVENOUS; SUBCUTANEOUS at 13:12

## 2018-12-02 RX ADMIN — MONTELUKAST SODIUM 10 MG: 10 TABLET, FILM COATED ORAL at 20:50

## 2018-12-02 RX ADMIN — DILTIAZEM HYDROCHLORIDE 240 MG: 240 CAPSULE, COATED, EXTENDED RELEASE ORAL at 20:51

## 2018-12-02 RX ADMIN — TRAZODONE HYDROCHLORIDE 50 MG: 50 TABLET ORAL at 20:51

## 2018-12-02 RX ADMIN — Medication 2000 MG: at 20:15

## 2018-12-02 RX ADMIN — INSULIN GLARGINE 100 UNITS: 100 INJECTION, SOLUTION SUBCUTANEOUS at 20:52

## 2018-12-02 RX ADMIN — METOPROLOL TARTRATE 100 MG: 50 TABLET ORAL at 20:51

## 2018-12-02 RX ADMIN — OXYCODONE AND ACETAMINOPHEN 1 TABLET: 10; 325 TABLET ORAL at 05:59

## 2018-12-02 RX ADMIN — PANTOPRAZOLE SODIUM 40 MG: 40 TABLET, DELAYED RELEASE ORAL at 05:59

## 2018-12-02 RX ADMIN — TORSEMIDE 20 MG: 20 TABLET ORAL at 08:48

## 2018-12-02 RX ADMIN — MICONAZOLE NITRATE: 2 OINTMENT TOPICAL at 20:57

## 2018-12-02 RX ADMIN — Medication 10 ML: at 04:15

## 2018-12-02 RX ADMIN — INSULIN LISPRO 9 UNITS: 100 INJECTION, SOLUTION INTRAVENOUS; SUBCUTANEOUS at 08:52

## 2018-12-02 RX ADMIN — CETIRIZINE HYDROCHLORIDE 10 MG: 10 TABLET, FILM COATED ORAL at 08:48

## 2018-12-02 ASSESSMENT — PAIN DESCRIPTION - FREQUENCY
FREQUENCY: CONTINUOUS

## 2018-12-02 ASSESSMENT — PAIN DESCRIPTION - LOCATION
LOCATION: LEG
LOCATION: HEAD
LOCATION: LEG

## 2018-12-02 ASSESSMENT — PAIN DESCRIPTION - PAIN TYPE
TYPE: ACUTE PAIN

## 2018-12-02 ASSESSMENT — PAIN DESCRIPTION - ORIENTATION
ORIENTATION: RIGHT;LEFT

## 2018-12-02 ASSESSMENT — PAIN DESCRIPTION - ONSET
ONSET: ON-GOING
ONSET: ON-GOING

## 2018-12-02 ASSESSMENT — PAIN DESCRIPTION - DESCRIPTORS
DESCRIPTORS: ACHING;SORE
DESCRIPTORS: ACHING;CONSTANT;DISCOMFORT
DESCRIPTORS: ACHING;SORE
DESCRIPTORS: ACHING;CONSTANT;DISCOMFORT
DESCRIPTORS: HEADACHE
DESCRIPTORS: ACHING;CONSTANT;DISCOMFORT

## 2018-12-02 ASSESSMENT — PAIN SCALES - GENERAL
PAINLEVEL_OUTOF10: 8
PAINLEVEL_OUTOF10: 2
PAINLEVEL_OUTOF10: 6
PAINLEVEL_OUTOF10: 9
PAINLEVEL_OUTOF10: 3
PAINLEVEL_OUTOF10: 2
PAINLEVEL_OUTOF10: 6
PAINLEVEL_OUTOF10: 0
PAINLEVEL_OUTOF10: 0

## 2018-12-02 ASSESSMENT — PAIN DESCRIPTION - PROGRESSION
CLINICAL_PROGRESSION: GRADUALLY WORSENING
CLINICAL_PROGRESSION: GRADUALLY IMPROVING

## 2018-12-02 NOTE — PROGRESS NOTES
CC- B/L LE cellulitis    Subjective: The patient is awake and alert. Tolerating medications. Reports no side effects. Legs improving. Afebrile. 10 ROS otherwise negative unless otherwise specified above. Objective:    Vitals:    12/02/18 0745   BP: 134/67   Pulse: 76   Resp: 18   Temp: 98.2 °F (36.8 °C)   SpO2: 99%       General Appearance:    Awake, alert , no acute distress. HEENT:    Normocephalic,PERRL,neck supple, no JVD, mucosa moist, no thrush   Lungs:     Clear to auscultation bilaterally, no wheeze , crackles   Heart:    Regular rate and rhythm, no murmur   Abdomen:     Soft, non-tender, not distended  bowel sounds present,     Extremities:   B/L LE lymphedema, Left lower leg wrapped to mid-shin - erythema of leg/thigh significantly improved. Pulses:   2+ and symmetric all extremities   Skin:   warm/dry, no rash         CRP 10.9    CBC+dif:  Reviewed and trend followed     Radiology:  Noted     Microbiology:  Wound cx- oxidase + gnr, group B strep      Assessment:     · LLE cellulitis. S/p left leg tissue bx on 11/30  · B/L LE lymphedema  · onychomycosis     Plan:    · Continue Vancomycin and Cefepime, day 4  · Terbinafine  · Vancomycin level 13  · Will follow with you     Electronically signed by RAUL Gottlieb CNP on 12/2/2018 at 1:35 PM    Patient seen and examined. I had a face to face encounter with the patient. Agree with exam, assessment and plan as outlined above. Addition and corrections were done as deemed appropriate. My exam and plan include:   Erythema is definitely improving. Xavier Litten  12/2/2018

## 2018-12-03 LAB
ANAEROBIC CULTURE: NORMAL
ANION GAP SERPL CALCULATED.3IONS-SCNC: 8 MMOL/L (ref 7–16)
ANISOCYTOSIS: ABNORMAL
BASOPHILS ABSOLUTE: 0.09 E9/L (ref 0–0.2)
BASOPHILS RELATIVE PERCENT: 0.9 % (ref 0–2)
BUN BLDV-MCNC: 24 MG/DL (ref 8–23)
CALCIUM SERPL-MCNC: 9 MG/DL (ref 8.6–10.2)
CHLORIDE BLD-SCNC: 89 MMOL/L (ref 98–107)
CO2: 34 MMOL/L (ref 22–29)
CREAT SERPL-MCNC: 1 MG/DL (ref 0.5–1)
EOSINOPHILS ABSOLUTE: 0.35 E9/L (ref 0.05–0.5)
EOSINOPHILS RELATIVE PERCENT: 3.5 % (ref 0–6)
GFR AFRICAN AMERICAN: >60
GFR NON-AFRICAN AMERICAN: 56 ML/MIN/1.73
GLUCOSE BLD-MCNC: 309 MG/DL (ref 74–99)
HCT VFR BLD CALC: 29.7 % (ref 34–48)
HEMOGLOBIN: 8.5 G/DL (ref 11.5–15.5)
HYPOCHROMIA: ABNORMAL
LYMPHOCYTES ABSOLUTE: 2.4 E9/L (ref 1.5–4)
LYMPHOCYTES RELATIVE PERCENT: 24.3 % (ref 20–42)
MCH RBC QN AUTO: 23.3 PG (ref 26–35)
MCHC RBC AUTO-ENTMCNC: 28.6 % (ref 32–34.5)
MCV RBC AUTO: 81.4 FL (ref 80–99.9)
METAMYELOCYTES RELATIVE PERCENT: 0.9 % (ref 0–1)
METER GLUCOSE: 215 MG/DL (ref 74–99)
METER GLUCOSE: 267 MG/DL (ref 74–99)
METER GLUCOSE: 307 MG/DL (ref 74–99)
METER GLUCOSE: 331 MG/DL (ref 74–99)
METER GLUCOSE: 338 MG/DL (ref 74–99)
MONOCYTES ABSOLUTE: 0.9 E9/L (ref 0.1–0.95)
MONOCYTES RELATIVE PERCENT: 8.7 % (ref 2–12)
NEUTROPHILS ABSOLUTE: 6.3 E9/L (ref 1.8–7.3)
NEUTROPHILS RELATIVE PERCENT: 61.7 % (ref 43–80)
NUCLEATED RED BLOOD CELLS: 0 /100 WBC
OVALOCYTES: ABNORMAL
PDW BLD-RTO: 17.2 FL (ref 11.5–15)
PLATELET # BLD: 465 E9/L (ref 130–450)
PMV BLD AUTO: 8.9 FL (ref 7–12)
POIKILOCYTES: ABNORMAL
POLYCHROMASIA: ABNORMAL
POTASSIUM SERPL-SCNC: 5 MMOL/L (ref 3.5–5)
RBC # BLD: 3.65 E12/L (ref 3.5–5.5)
SODIUM BLD-SCNC: 131 MMOL/L (ref 132–146)
WBC # BLD: 10 E9/L (ref 4.5–11.5)

## 2018-12-03 PROCEDURE — 02HV33Z INSERTION OF INFUSION DEVICE INTO SUPERIOR VENA CAVA, PERCUTANEOUS APPROACH: ICD-10-PCS | Performed by: INTERNAL MEDICINE

## 2018-12-03 PROCEDURE — B548ZZA ULTRASONOGRAPHY OF SUPERIOR VENA CAVA, GUIDANCE: ICD-10-PCS | Performed by: INTERNAL MEDICINE

## 2018-12-03 PROCEDURE — 6370000000 HC RX 637 (ALT 250 FOR IP): Performed by: INTERNAL MEDICINE

## 2018-12-03 PROCEDURE — 76937 US GUIDE VASCULAR ACCESS: CPT

## 2018-12-03 PROCEDURE — 82962 GLUCOSE BLOOD TEST: CPT

## 2018-12-03 PROCEDURE — 6360000002 HC RX W HCPCS: Performed by: INTERNAL MEDICINE

## 2018-12-03 PROCEDURE — 36415 COLL VENOUS BLD VENIPUNCTURE: CPT

## 2018-12-03 PROCEDURE — 2700000000 HC OXYGEN THERAPY PER DAY

## 2018-12-03 PROCEDURE — 1200000000 HC SEMI PRIVATE

## 2018-12-03 PROCEDURE — 80048 BASIC METABOLIC PNL TOTAL CA: CPT

## 2018-12-03 PROCEDURE — C1751 CATH, INF, PER/CENT/MIDLINE: HCPCS

## 2018-12-03 PROCEDURE — 6370000000 HC RX 637 (ALT 250 FOR IP): Performed by: NURSE PRACTITIONER

## 2018-12-03 PROCEDURE — 36569 INSJ PICC 5 YR+ W/O IMAGING: CPT

## 2018-12-03 PROCEDURE — 2500000003 HC RX 250 WO HCPCS: Performed by: INTERNAL MEDICINE

## 2018-12-03 PROCEDURE — 99232 SBSQ HOSP IP/OBS MODERATE 35: CPT | Performed by: INTERNAL MEDICINE

## 2018-12-03 PROCEDURE — 2580000003 HC RX 258: Performed by: INTERNAL MEDICINE

## 2018-12-03 PROCEDURE — 94640 AIRWAY INHALATION TREATMENT: CPT

## 2018-12-03 PROCEDURE — 85025 COMPLETE CBC W/AUTO DIFF WBC: CPT

## 2018-12-03 PROCEDURE — APPSS30 APP SPLIT SHARED TIME 16-30 MINUTES: Performed by: NURSE PRACTITIONER

## 2018-12-03 RX ORDER — HEPARIN SODIUM (PORCINE) LOCK FLUSH IV SOLN 100 UNIT/ML 100 UNIT/ML
3 SOLUTION INTRAVENOUS PRN
Status: DISCONTINUED | OUTPATIENT
Start: 2018-12-03 | End: 2018-12-05 | Stop reason: HOSPADM

## 2018-12-03 RX ORDER — LIDOCAINE HYDROCHLORIDE 10 MG/ML
5 INJECTION, SOLUTION EPIDURAL; INFILTRATION; INTRACAUDAL; PERINEURAL ONCE
Status: COMPLETED | OUTPATIENT
Start: 2018-12-03 | End: 2018-12-03

## 2018-12-03 RX ORDER — SODIUM CHLORIDE 0.9 % (FLUSH) 0.9 %
10 SYRINGE (ML) INJECTION PRN
Status: DISCONTINUED | OUTPATIENT
Start: 2018-12-03 | End: 2018-12-05 | Stop reason: HOSPADM

## 2018-12-03 RX ORDER — HEPARIN SODIUM (PORCINE) LOCK FLUSH IV SOLN 100 UNIT/ML 100 UNIT/ML
3 SOLUTION INTRAVENOUS EVERY 12 HOURS SCHEDULED
Status: DISCONTINUED | OUTPATIENT
Start: 2018-12-03 | End: 2018-12-05 | Stop reason: HOSPADM

## 2018-12-03 RX ADMIN — ESCITALOPRAM OXALATE 20 MG: 10 TABLET ORAL at 09:37

## 2018-12-03 RX ADMIN — Medication 2000 MG: at 20:54

## 2018-12-03 RX ADMIN — GABAPENTIN 400 MG: 400 CAPSULE ORAL at 09:36

## 2018-12-03 RX ADMIN — PANTOPRAZOLE SODIUM 40 MG: 40 TABLET, DELAYED RELEASE ORAL at 05:58

## 2018-12-03 RX ADMIN — INSULIN LISPRO 35 UNITS: 100 INJECTION, SOLUTION INTRAVENOUS; SUBCUTANEOUS at 13:13

## 2018-12-03 RX ADMIN — CEFEPIME 2 G: 2 INJECTION, POWDER, FOR SOLUTION INTRAMUSCULAR; INTRAVENOUS at 18:20

## 2018-12-03 RX ADMIN — INSULIN LISPRO 12 UNITS: 100 INJECTION, SOLUTION INTRAVENOUS; SUBCUTANEOUS at 13:11

## 2018-12-03 RX ADMIN — FERROUS SULFATE TAB 325 MG (65 MG ELEMENTAL FE) 325 MG: 325 (65 FE) TAB at 09:36

## 2018-12-03 RX ADMIN — Medication 10 ML: at 04:00

## 2018-12-03 RX ADMIN — MICONAZOLE NITRATE: 2 OINTMENT TOPICAL at 20:55

## 2018-12-03 RX ADMIN — GABAPENTIN 400 MG: 400 CAPSULE ORAL at 20:55

## 2018-12-03 RX ADMIN — CETIRIZINE HYDROCHLORIDE 10 MG: 10 TABLET, FILM COATED ORAL at 09:37

## 2018-12-03 RX ADMIN — INSULIN LISPRO 38 UNITS: 100 INJECTION, SOLUTION INTRAVENOUS; SUBCUTANEOUS at 18:24

## 2018-12-03 RX ADMIN — INSULIN LISPRO 12 UNITS: 100 INJECTION, SOLUTION INTRAVENOUS; SUBCUTANEOUS at 09:39

## 2018-12-03 RX ADMIN — TORSEMIDE 20 MG: 20 TABLET ORAL at 09:37

## 2018-12-03 RX ADMIN — Medication 10 ML: at 18:20

## 2018-12-03 RX ADMIN — Medication 300 UNITS: at 20:55

## 2018-12-03 RX ADMIN — GABAPENTIN 400 MG: 400 CAPSULE ORAL at 14:47

## 2018-12-03 RX ADMIN — METOPROLOL TARTRATE 100 MG: 50 TABLET ORAL at 20:55

## 2018-12-03 RX ADMIN — INSULIN LISPRO 3 UNITS: 100 INJECTION, SOLUTION INTRAVENOUS; SUBCUTANEOUS at 20:56

## 2018-12-03 RX ADMIN — FLUTICASONE PROPIONATE 2 SPRAY: 50 SPRAY, METERED NASAL at 09:36

## 2018-12-03 RX ADMIN — ALBUTEROL SULFATE 2 PUFF: 90 AEROSOL, METERED RESPIRATORY (INHALATION) at 08:13

## 2018-12-03 RX ADMIN — MICONAZOLE NITRATE: 2 OINTMENT TOPICAL at 09:39

## 2018-12-03 RX ADMIN — INSULIN GLARGINE 100 UNITS: 100 INJECTION, SOLUTION SUBCUTANEOUS at 20:56

## 2018-12-03 RX ADMIN — TRAZODONE HYDROCHLORIDE 50 MG: 50 TABLET ORAL at 20:55

## 2018-12-03 RX ADMIN — METOPROLOL TARTRATE 100 MG: 50 TABLET ORAL at 09:37

## 2018-12-03 RX ADMIN — ALPRAZOLAM 0.25 MG: 0.25 TABLET ORAL at 20:54

## 2018-12-03 RX ADMIN — Medication 2000 MG: at 09:36

## 2018-12-03 RX ADMIN — INSULIN LISPRO 9 UNITS: 100 INJECTION, SOLUTION INTRAVENOUS; SUBCUTANEOUS at 18:22

## 2018-12-03 RX ADMIN — ALBUTEROL SULFATE 2 PUFF: 90 AEROSOL, METERED RESPIRATORY (INHALATION) at 19:03

## 2018-12-03 RX ADMIN — MOMETASONE FUROATE AND FORMOTEROL FUMARATE DIHYDRATE 2 PUFF: 200; 5 AEROSOL RESPIRATORY (INHALATION) at 19:03

## 2018-12-03 RX ADMIN — MONTELUKAST SODIUM 10 MG: 10 TABLET, FILM COATED ORAL at 20:55

## 2018-12-03 RX ADMIN — CEFEPIME 2 G: 2 INJECTION, POWDER, FOR SOLUTION INTRAMUSCULAR; INTRAVENOUS at 04:00

## 2018-12-03 RX ADMIN — INSULIN LISPRO 35 UNITS: 100 INJECTION, SOLUTION INTRAVENOUS; SUBCUTANEOUS at 09:42

## 2018-12-03 RX ADMIN — APIXABAN 5 MG: 5 TABLET, FILM COATED ORAL at 09:37

## 2018-12-03 RX ADMIN — Medication 10 ML: at 09:36

## 2018-12-03 RX ADMIN — OXYCODONE AND ACETAMINOPHEN 1 TABLET: 10; 325 TABLET ORAL at 22:28

## 2018-12-03 RX ADMIN — LIDOCAINE HYDROCHLORIDE 5 ML: 10 INJECTION, SOLUTION EPIDURAL; INFILTRATION; INTRACAUDAL; PERINEURAL at 16:00

## 2018-12-03 RX ADMIN — Medication 10 ML: at 20:55

## 2018-12-03 RX ADMIN — SPIRONOLACTONE 25 MG: 25 TABLET ORAL at 09:38

## 2018-12-03 RX ADMIN — DILTIAZEM HYDROCHLORIDE 240 MG: 240 CAPSULE, COATED, EXTENDED RELEASE ORAL at 09:36

## 2018-12-03 RX ADMIN — TERBINAFINE HYDROCHLORIDE: 1 CREAM TOPICAL at 09:38

## 2018-12-03 RX ADMIN — TERBINAFINE HYDROCHLORIDE: 1 CREAM TOPICAL at 20:55

## 2018-12-03 RX ADMIN — MOMETASONE FUROATE AND FORMOTEROL FUMARATE DIHYDRATE 2 PUFF: 200; 5 AEROSOL RESPIRATORY (INHALATION) at 08:13

## 2018-12-03 RX ADMIN — OXYCODONE AND ACETAMINOPHEN 1 TABLET: 10; 325 TABLET ORAL at 18:19

## 2018-12-03 RX ADMIN — DILTIAZEM HYDROCHLORIDE 240 MG: 240 CAPSULE, COATED, EXTENDED RELEASE ORAL at 20:55

## 2018-12-03 RX ADMIN — FENOFIBRATE 160 MG: 160 TABLET ORAL at 09:36

## 2018-12-03 RX ADMIN — OXYCODONE AND ACETAMINOPHEN 1 TABLET: 10; 325 TABLET ORAL at 13:17

## 2018-12-03 RX ADMIN — APIXABAN 5 MG: 5 TABLET, FILM COATED ORAL at 20:55

## 2018-12-03 RX ADMIN — OXYCODONE AND ACETAMINOPHEN 1 TABLET: 10; 325 TABLET ORAL at 01:22

## 2018-12-03 RX ADMIN — OXYCODONE AND ACETAMINOPHEN 1 TABLET: 10; 325 TABLET ORAL at 05:58

## 2018-12-03 ASSESSMENT — PAIN DESCRIPTION - PROGRESSION
CLINICAL_PROGRESSION: NOT CHANGED
CLINICAL_PROGRESSION: NOT CHANGED

## 2018-12-03 ASSESSMENT — PAIN SCALES - GENERAL
PAINLEVEL_OUTOF10: 5
PAINLEVEL_OUTOF10: 8
PAINLEVEL_OUTOF10: 3
PAINLEVEL_OUTOF10: 2
PAINLEVEL_OUTOF10: 5
PAINLEVEL_OUTOF10: 3
PAINLEVEL_OUTOF10: 7
PAINLEVEL_OUTOF10: 0
PAINLEVEL_OUTOF10: 6
PAINLEVEL_OUTOF10: 3

## 2018-12-03 ASSESSMENT — PAIN DESCRIPTION - DESCRIPTORS
DESCRIPTORS: ACHING;SORE
DESCRIPTORS: ACHING;SORE;DISCOMFORT
DESCRIPTORS: ACHING;DISCOMFORT;SORE
DESCRIPTORS: ACHING;SORE

## 2018-12-03 ASSESSMENT — PAIN DESCRIPTION - FREQUENCY
FREQUENCY: CONTINUOUS

## 2018-12-03 ASSESSMENT — PAIN DESCRIPTION - ORIENTATION
ORIENTATION: LEFT

## 2018-12-03 ASSESSMENT — PAIN DESCRIPTION - ONSET
ONSET: ON-GOING
ONSET: GRADUAL

## 2018-12-03 ASSESSMENT — PAIN DESCRIPTION - LOCATION
LOCATION: LEG

## 2018-12-03 ASSESSMENT — PAIN DESCRIPTION - PAIN TYPE
TYPE: ACUTE PAIN

## 2018-12-03 NOTE — PROGRESS NOTES
Lymphedema  Resolved Problems:    * No resolved hospital problems. *      Plan:  1. Sepsis on admission with elevated heart rate and respiratory rate with presence of infection. Continue supportive care and tx underlying infection. improving  2. Cellulitis of LLE s/p I&d surg following abx per ID  3. Dm type 2 uncontrolled adjust home dose and make further adjustments from there. monitor bs and tx with insulin  4. Lymphedema contiue aldactone and demadex, consider giving additional lasix  5. Hyperkalemia monitor. Recheck K  6. htn monitor bp and tx  7. Atrial fib continue bblocker and anticoagulation as able  8.  Copd not in exacerbation conitinue inhalers  9. gerd continue ppi        Electronically signed by Carla Platt DO on 12/2/2018 at 9:44 PM

## 2018-12-03 NOTE — PROGRESS NOTES
CC- B/L LE cellulitis    Subjective: The patient is awake and alert. Tolerating medications. Reports no side effects. Legs improving. Afebrile. 10 ROS otherwise negative unless otherwise specified above. Objective:    Vitals:    12/03/18 0934   BP: (!) 118/53   Pulse: 97   Resp: 18   Temp: 97.4 °F (36.3 °C)   SpO2: 96%       General Appearance:    Awake, alert , no acute distress. HEENT:    Normocephalic,PERRL,neck supple, no JVD, mucosa moist, no thrush   Lungs:     Clear to auscultation bilaterally, no wheeze , crackles   Heart:    Regular rate and rhythm, no murmur   Abdomen:     Soft, non-tender, not distended  bowel sounds present,     Extremities:   B/L LE lymphedema, Left lower leg wrapped to mid-shin - erythema of leg/thigh significantly improved. Pulses:   2+ and symmetric all extremities   Skin:   warm/dry, no rash         CRP 10.9    CBC+dif:  Reviewed and trend followed     Radiology:  Noted     Microbiology:  Wound cx- oxidase + gnr, group B strep      Assessment:     · LLE cellulitis.  S/p left leg tissue bx on 11/30  · B/L LE lymphedema and chronic venous stasis ulcer with infection  · onychomycosis     Plan:    · Continue Vancomycin and Cefepime, day 6- upon discharge place PICC line and plan 2 weeks minmum  · Terbinafine  · Plan for vibra  · Will follow with you     Electronically signed by Kelsey Webb MD on 12/3/2018 at 1:04 PM

## 2018-12-04 LAB
ANION GAP SERPL CALCULATED.3IONS-SCNC: 7 MMOL/L (ref 7–16)
ANISOCYTOSIS: ABNORMAL
BASOPHILS ABSOLUTE: 0.08 E9/L (ref 0–0.2)
BASOPHILS RELATIVE PERCENT: 0.9 % (ref 0–2)
BUN BLDV-MCNC: 27 MG/DL (ref 8–23)
CALCIUM SERPL-MCNC: 9.1 MG/DL (ref 8.6–10.2)
CHLORIDE BLD-SCNC: 92 MMOL/L (ref 98–107)
CO2: 35 MMOL/L (ref 22–29)
CREAT SERPL-MCNC: 1.3 MG/DL (ref 0.5–1)
EOSINOPHILS ABSOLUTE: 0.22 E9/L (ref 0.05–0.5)
EOSINOPHILS RELATIVE PERCENT: 2.6 % (ref 0–6)
GFR AFRICAN AMERICAN: 50
GFR NON-AFRICAN AMERICAN: 41 ML/MIN/1.73
GLUCOSE BLD-MCNC: 209 MG/DL (ref 74–99)
HCT VFR BLD CALC: 27.3 % (ref 34–48)
HEMOGLOBIN: 8 G/DL (ref 11.5–15.5)
LYMPHOCYTES ABSOLUTE: 1.55 E9/L (ref 1.5–4)
LYMPHOCYTES RELATIVE PERCENT: 17.6 % (ref 20–42)
MCH RBC QN AUTO: 23.7 PG (ref 26–35)
MCHC RBC AUTO-ENTMCNC: 29.3 % (ref 32–34.5)
MCV RBC AUTO: 81 FL (ref 80–99.9)
METAMYELOCYTES RELATIVE PERCENT: 3.5 % (ref 0–1)
METER GLUCOSE: 187 MG/DL (ref 74–99)
METER GLUCOSE: 234 MG/DL (ref 74–99)
METER GLUCOSE: 379 MG/DL (ref 74–99)
MONOCYTES ABSOLUTE: 0.52 E9/L (ref 0.1–0.95)
MONOCYTES RELATIVE PERCENT: 6.1 % (ref 2–12)
MYELOCYTE PERCENT: 2.6 % (ref 0–0)
NEUTROPHILS ABSOLUTE: 6.28 E9/L (ref 1.8–7.3)
NEUTROPHILS RELATIVE PERCENT: 66.7 % (ref 43–80)
NUCLEATED RED BLOOD CELLS: 0 /100 WBC
PDW BLD-RTO: 17.4 FL (ref 11.5–15)
PLATELET # BLD: 453 E9/L (ref 130–450)
PMV BLD AUTO: 9.3 FL (ref 7–12)
POTASSIUM SERPL-SCNC: 4.9 MMOL/L (ref 3.5–5)
RBC # BLD: 3.37 E12/L (ref 3.5–5.5)
SODIUM BLD-SCNC: 134 MMOL/L (ref 132–146)
WBC # BLD: 8.6 E9/L (ref 4.5–11.5)

## 2018-12-04 PROCEDURE — 80048 BASIC METABOLIC PNL TOTAL CA: CPT

## 2018-12-04 PROCEDURE — 97530 THERAPEUTIC ACTIVITIES: CPT

## 2018-12-04 PROCEDURE — 85025 COMPLETE CBC W/AUTO DIFF WBC: CPT

## 2018-12-04 PROCEDURE — 99232 SBSQ HOSP IP/OBS MODERATE 35: CPT | Performed by: INTERNAL MEDICINE

## 2018-12-04 PROCEDURE — APPSS30 APP SPLIT SHARED TIME 16-30 MINUTES: Performed by: NURSE PRACTITIONER

## 2018-12-04 PROCEDURE — 36415 COLL VENOUS BLD VENIPUNCTURE: CPT

## 2018-12-04 PROCEDURE — 6370000000 HC RX 637 (ALT 250 FOR IP): Performed by: INTERNAL MEDICINE

## 2018-12-04 PROCEDURE — 2580000003 HC RX 258: Performed by: INTERNAL MEDICINE

## 2018-12-04 PROCEDURE — 94640 AIRWAY INHALATION TREATMENT: CPT

## 2018-12-04 PROCEDURE — 1200000000 HC SEMI PRIVATE

## 2018-12-04 PROCEDURE — 82962 GLUCOSE BLOOD TEST: CPT

## 2018-12-04 PROCEDURE — 2700000000 HC OXYGEN THERAPY PER DAY

## 2018-12-04 PROCEDURE — 6360000002 HC RX W HCPCS: Performed by: INTERNAL MEDICINE

## 2018-12-04 PROCEDURE — 97535 SELF CARE MNGMENT TRAINING: CPT

## 2018-12-04 PROCEDURE — 6370000000 HC RX 637 (ALT 250 FOR IP): Performed by: NURSE PRACTITIONER

## 2018-12-04 RX ORDER — PRENATAL VIT 91/IRON/FOLIC/DHA 28-975-200
COMBINATION PACKAGE (EA) ORAL
Qty: 3 TUBE | Refills: 1 | DISCHARGE
Start: 2018-12-04 | End: 2019-06-18

## 2018-12-04 RX ADMIN — PANTOPRAZOLE SODIUM 40 MG: 40 TABLET, DELAYED RELEASE ORAL at 06:27

## 2018-12-04 RX ADMIN — Medication 10 ML: at 20:36

## 2018-12-04 RX ADMIN — OXYCODONE AND ACETAMINOPHEN 1 TABLET: 10; 325 TABLET ORAL at 07:34

## 2018-12-04 RX ADMIN — ACETAMINOPHEN 650 MG: 325 TABLET ORAL at 06:27

## 2018-12-04 RX ADMIN — FERROUS SULFATE TAB 325 MG (65 MG ELEMENTAL FE) 325 MG: 325 (65 FE) TAB at 09:00

## 2018-12-04 RX ADMIN — ALBUTEROL SULFATE 2 PUFF: 90 AEROSOL, METERED RESPIRATORY (INHALATION) at 19:57

## 2018-12-04 RX ADMIN — SPIRONOLACTONE 25 MG: 25 TABLET ORAL at 09:01

## 2018-12-04 RX ADMIN — INSULIN LISPRO 40 UNITS: 100 INJECTION, SOLUTION INTRAVENOUS; SUBCUTANEOUS at 12:31

## 2018-12-04 RX ADMIN — INSULIN LISPRO 15 UNITS: 100 INJECTION, SOLUTION INTRAVENOUS; SUBCUTANEOUS at 12:33

## 2018-12-04 RX ADMIN — OXYCODONE AND ACETAMINOPHEN 1 TABLET: 10; 325 TABLET ORAL at 03:18

## 2018-12-04 RX ADMIN — Medication 2000 MG: at 09:55

## 2018-12-04 RX ADMIN — INSULIN LISPRO 3 UNITS: 100 INJECTION, SOLUTION INTRAVENOUS; SUBCUTANEOUS at 20:30

## 2018-12-04 RX ADMIN — INSULIN LISPRO 38 UNITS: 100 INJECTION, SOLUTION INTRAVENOUS; SUBCUTANEOUS at 09:12

## 2018-12-04 RX ADMIN — MONTELUKAST SODIUM 10 MG: 10 TABLET, FILM COATED ORAL at 20:30

## 2018-12-04 RX ADMIN — Medication 300 UNITS: at 09:05

## 2018-12-04 RX ADMIN — GABAPENTIN 400 MG: 400 CAPSULE ORAL at 13:33

## 2018-12-04 RX ADMIN — TORSEMIDE 20 MG: 20 TABLET ORAL at 09:01

## 2018-12-04 RX ADMIN — FENOFIBRATE 160 MG: 160 TABLET ORAL at 09:00

## 2018-12-04 RX ADMIN — MEROPENEM 2 G: 1 INJECTION, POWDER, FOR SOLUTION INTRAVENOUS at 11:50

## 2018-12-04 RX ADMIN — GABAPENTIN 400 MG: 400 CAPSULE ORAL at 20:29

## 2018-12-04 RX ADMIN — OXYCODONE AND ACETAMINOPHEN 1 TABLET: 10; 325 TABLET ORAL at 20:56

## 2018-12-04 RX ADMIN — APIXABAN 5 MG: 5 TABLET, FILM COATED ORAL at 20:30

## 2018-12-04 RX ADMIN — Medication 10 ML: at 09:06

## 2018-12-04 RX ADMIN — MOMETASONE FUROATE AND FORMOTEROL FUMARATE DIHYDRATE 2 PUFF: 200; 5 AEROSOL RESPIRATORY (INHALATION) at 08:46

## 2018-12-04 RX ADMIN — METOPROLOL TARTRATE 100 MG: 50 TABLET ORAL at 09:55

## 2018-12-04 RX ADMIN — DILTIAZEM HYDROCHLORIDE 240 MG: 240 CAPSULE, COATED, EXTENDED RELEASE ORAL at 20:29

## 2018-12-04 RX ADMIN — MEROPENEM 2 G: 1 INJECTION, POWDER, FOR SOLUTION INTRAVENOUS at 22:34

## 2018-12-04 RX ADMIN — METOPROLOL TARTRATE 100 MG: 50 TABLET ORAL at 20:29

## 2018-12-04 RX ADMIN — INSULIN LISPRO 25 UNITS: 100 INJECTION, SOLUTION INTRAVENOUS; SUBCUTANEOUS at 17:42

## 2018-12-04 RX ADMIN — INSULIN LISPRO 6 UNITS: 100 INJECTION, SOLUTION INTRAVENOUS; SUBCUTANEOUS at 09:14

## 2018-12-04 RX ADMIN — MOMETASONE FUROATE AND FORMOTEROL FUMARATE DIHYDRATE 2 PUFF: 200; 5 AEROSOL RESPIRATORY (INHALATION) at 19:57

## 2018-12-04 RX ADMIN — DILTIAZEM HYDROCHLORIDE 240 MG: 240 CAPSULE, COATED, EXTENDED RELEASE ORAL at 09:00

## 2018-12-04 RX ADMIN — CEFEPIME 2 G: 2 INJECTION, POWDER, FOR SOLUTION INTRAMUSCULAR; INTRAVENOUS at 06:28

## 2018-12-04 RX ADMIN — OXYCODONE AND ACETAMINOPHEN 1 TABLET: 10; 325 TABLET ORAL at 16:42

## 2018-12-04 RX ADMIN — MICONAZOLE NITRATE: 2 OINTMENT TOPICAL at 09:04

## 2018-12-04 RX ADMIN — INSULIN GLARGINE 100 UNITS: 100 INJECTION, SOLUTION SUBCUTANEOUS at 20:30

## 2018-12-04 RX ADMIN — Medication 300 UNITS: at 20:35

## 2018-12-04 RX ADMIN — ALBUTEROL SULFATE 2 PUFF: 90 AEROSOL, METERED RESPIRATORY (INHALATION) at 08:46

## 2018-12-04 RX ADMIN — MICONAZOLE NITRATE: 2 OINTMENT TOPICAL at 20:36

## 2018-12-04 RX ADMIN — CETIRIZINE HYDROCHLORIDE 10 MG: 10 TABLET, FILM COATED ORAL at 09:00

## 2018-12-04 RX ADMIN — TERBINAFINE HYDROCHLORIDE: 1 CREAM TOPICAL at 09:05

## 2018-12-04 RX ADMIN — OXYCODONE AND ACETAMINOPHEN 1 TABLET: 10; 325 TABLET ORAL at 12:39

## 2018-12-04 RX ADMIN — ESCITALOPRAM OXALATE 20 MG: 10 TABLET ORAL at 09:00

## 2018-12-04 RX ADMIN — TERBINAFINE HYDROCHLORIDE: 1 CREAM TOPICAL at 20:36

## 2018-12-04 RX ADMIN — TRAZODONE HYDROCHLORIDE 50 MG: 50 TABLET ORAL at 20:30

## 2018-12-04 RX ADMIN — GABAPENTIN 400 MG: 400 CAPSULE ORAL at 09:00

## 2018-12-04 RX ADMIN — APIXABAN 5 MG: 5 TABLET, FILM COATED ORAL at 09:00

## 2018-12-04 RX ADMIN — FLUTICASONE PROPIONATE 2 SPRAY: 50 SPRAY, METERED NASAL at 09:03

## 2018-12-04 ASSESSMENT — PAIN SCALES - GENERAL
PAINLEVEL_OUTOF10: 4
PAINLEVEL_OUTOF10: 7
PAINLEVEL_OUTOF10: 0
PAINLEVEL_OUTOF10: 4
PAINLEVEL_OUTOF10: 5
PAINLEVEL_OUTOF10: 6
PAINLEVEL_OUTOF10: 2
PAINLEVEL_OUTOF10: 0
PAINLEVEL_OUTOF10: 7
PAINLEVEL_OUTOF10: 6
PAINLEVEL_OUTOF10: 7

## 2018-12-04 ASSESSMENT — PAIN DESCRIPTION - FREQUENCY: FREQUENCY: CONTINUOUS

## 2018-12-04 ASSESSMENT — PAIN DESCRIPTION - LOCATION
LOCATION: LEG
LOCATION: LEG

## 2018-12-04 ASSESSMENT — PAIN DESCRIPTION - ONSET: ONSET: ON-GOING

## 2018-12-04 ASSESSMENT — PAIN DESCRIPTION - DESCRIPTORS
DESCRIPTORS: ACHING
DESCRIPTORS: ACHING;DISCOMFORT

## 2018-12-04 ASSESSMENT — PAIN DESCRIPTION - PAIN TYPE
TYPE: ACUTE PAIN
TYPE: ACUTE PAIN

## 2018-12-04 ASSESSMENT — PAIN DESCRIPTION - ORIENTATION
ORIENTATION: LEFT
ORIENTATION: LEFT

## 2018-12-04 ASSESSMENT — PAIN DESCRIPTION - PROGRESSION: CLINICAL_PROGRESSION: NOT CHANGED

## 2018-12-04 NOTE — PROGRESS NOTES
infection. Last A1c 8.0. Adjust insulin. Continue to monitor BS closely. Improving some. 4. Lymphedema: Continue diuretics and leg wraps. Monitor BMP. 5. HTN: BP stable, continue home medications  6. Afib: Controlled, Continue Cardizem and Lopressor. On Eliquis. 7.  COPD: stable, continue home medications     Dispo: Discharge once pre-cert available to [facility]        Electronically signed by RAUL Humphrey CNP on 12/4/2018 at 11:22 AM

## 2018-12-04 NOTE — PROGRESS NOTES
ancillary service notes. - ID following- DC cefepime and vancomycin. PICC line placement 2 weeks minimum of IV abx. Surgical pathology in process at this time. Surgical cultures growing Pseudomonas aeruginosa, E.  Coli and  Strep Agalactiae  -   planning for discharge to [facility]  - Discussed patient with Dr. Jen Ng.  - Will continue to follow patient while they are [address] with above note     Await biopsy

## 2018-12-04 NOTE — PROGRESS NOTES
[facility] Quality Flow/Interdisciplinary Rounds Progress Note        Quality Flow Rounds held on December 4, 2018    Disciplines Attending:  Bedside Nurse, ,  and Nursing Unit 2883 Harish was admitted on 11/27/2018  1:06 PM    Anticipated Discharge Date:  Expected Discharge Date: 11/30/18    Disposition:    Kavon Score:  Kavon Scale Score: 20    Readmission Risk              Risk of Unplanned Readmission:        39           Discussed patient goal for the day, patient clinical progression, and barriers to discharge.   The following Goal(s) of the Day/Commitment(s) have been identified:  continue IVAB; await precert to Swetha Cruz  December 4, 2018

## 2018-12-05 VITALS
HEART RATE: 86 BPM | WEIGHT: 293 LBS | BODY MASS INDEX: 51.91 KG/M2 | TEMPERATURE: 98 F | HEIGHT: 63 IN | DIASTOLIC BLOOD PRESSURE: 71 MMHG | SYSTOLIC BLOOD PRESSURE: 125 MMHG | RESPIRATION RATE: 16 BRPM | OXYGEN SATURATION: 96 %

## 2018-12-05 LAB
ABO/RH: NORMAL
ANION GAP SERPL CALCULATED.3IONS-SCNC: 7 MMOL/L (ref 7–16)
ANISOCYTOSIS: ABNORMAL
ANTIBODY SCREEN: NORMAL
BASOPHILS ABSOLUTE: 0.07 E9/L (ref 0–0.2)
BASOPHILS RELATIVE PERCENT: 0.9 % (ref 0–2)
BLOOD BANK DISPENSE STATUS: NORMAL
BLOOD BANK PRODUCT CODE: NORMAL
BPU ID: NORMAL
BUN BLDV-MCNC: 30 MG/DL (ref 8–23)
CALCIUM SERPL-MCNC: 8.2 MG/DL (ref 8.6–10.2)
CHLORIDE BLD-SCNC: 98 MMOL/L (ref 98–107)
CO2: 33 MMOL/L (ref 22–29)
CREAT SERPL-MCNC: 1.3 MG/DL (ref 0.5–1)
DESCRIPTION BLOOD BANK: NORMAL
EOSINOPHILS ABSOLUTE: 0.2 E9/L (ref 0.05–0.5)
EOSINOPHILS RELATIVE PERCENT: 2.6 % (ref 0–6)
GFR AFRICAN AMERICAN: 50
GFR NON-AFRICAN AMERICAN: 41 ML/MIN/1.73
GLUCOSE BLD-MCNC: 231 MG/DL (ref 74–99)
HCT VFR BLD CALC: 23.5 % (ref 34–48)
HEMOGLOBIN: 6.8 G/DL (ref 11.5–15.5)
HYPOCHROMIA: ABNORMAL
IMMATURE GRANULOCYTES #: 0.28 E9/L
IMMATURE GRANULOCYTES %: 3.7 % (ref 0–5)
LYMPHOCYTES ABSOLUTE: 1.75 E9/L (ref 1.5–4)
LYMPHOCYTES RELATIVE PERCENT: 23.1 % (ref 20–42)
MCH RBC QN AUTO: 23.5 PG (ref 26–35)
MCHC RBC AUTO-ENTMCNC: 28.9 % (ref 32–34.5)
MCV RBC AUTO: 81.3 FL (ref 80–99.9)
METER GLUCOSE: 143 MG/DL (ref 74–99)
METER GLUCOSE: 250 MG/DL (ref 74–99)
MONOCYTES ABSOLUTE: 0.62 E9/L (ref 0.1–0.95)
MONOCYTES RELATIVE PERCENT: 8.2 % (ref 2–12)
NEUTROPHILS ABSOLUTE: 4.67 E9/L (ref 1.8–7.3)
NEUTROPHILS RELATIVE PERCENT: 61.5 % (ref 43–80)
PDW BLD-RTO: 17.4 FL (ref 11.5–15)
PLATELET # BLD: 384 E9/L (ref 130–450)
PMV BLD AUTO: 8.9 FL (ref 7–12)
POTASSIUM SERPL-SCNC: 4.7 MMOL/L (ref 3.5–5)
RBC # BLD: 2.89 E12/L (ref 3.5–5.5)
SODIUM BLD-SCNC: 138 MMOL/L (ref 132–146)
WBC # BLD: 7.6 E9/L (ref 4.5–11.5)

## 2018-12-05 PROCEDURE — 36415 COLL VENOUS BLD VENIPUNCTURE: CPT

## 2018-12-05 PROCEDURE — P9016 RBC LEUKOCYTES REDUCED: HCPCS

## 2018-12-05 PROCEDURE — 86923 COMPATIBILITY TEST ELECTRIC: CPT

## 2018-12-05 PROCEDURE — 86901 BLOOD TYPING SEROLOGIC RH(D): CPT

## 2018-12-05 PROCEDURE — 2700000000 HC OXYGEN THERAPY PER DAY

## 2018-12-05 PROCEDURE — 86900 BLOOD TYPING SEROLOGIC ABO: CPT

## 2018-12-05 PROCEDURE — 86850 RBC ANTIBODY SCREEN: CPT

## 2018-12-05 PROCEDURE — 85025 COMPLETE CBC W/AUTO DIFF WBC: CPT

## 2018-12-05 PROCEDURE — 2580000003 HC RX 258: Performed by: INTERNAL MEDICINE

## 2018-12-05 PROCEDURE — 80048 BASIC METABOLIC PNL TOTAL CA: CPT

## 2018-12-05 PROCEDURE — 6360000002 HC RX W HCPCS: Performed by: INTERNAL MEDICINE

## 2018-12-05 PROCEDURE — 6370000000 HC RX 637 (ALT 250 FOR IP): Performed by: INTERNAL MEDICINE

## 2018-12-05 PROCEDURE — 6370000000 HC RX 637 (ALT 250 FOR IP): Performed by: NURSE PRACTITIONER

## 2018-12-05 PROCEDURE — 94640 AIRWAY INHALATION TREATMENT: CPT

## 2018-12-05 PROCEDURE — 36430 TRANSFUSION BLD/BLD COMPNT: CPT

## 2018-12-05 PROCEDURE — 99239 HOSP IP/OBS DSCHRG MGMT >30: CPT | Performed by: HOSPITALIST

## 2018-12-05 PROCEDURE — 82962 GLUCOSE BLOOD TEST: CPT

## 2018-12-05 RX ORDER — 0.9 % SODIUM CHLORIDE 0.9 %
250 INTRAVENOUS SOLUTION INTRAVENOUS ONCE
Status: DISCONTINUED | OUTPATIENT
Start: 2018-12-05 | End: 2018-12-05 | Stop reason: HOSPADM

## 2018-12-05 RX ORDER — OXYCODONE AND ACETAMINOPHEN 10; 325 MG/1; MG/1
1 TABLET ORAL EVERY 8 HOURS PRN
Qty: 15 TABLET | Refills: 0 | Status: SHIPPED | OUTPATIENT
Start: 2018-12-05 | End: 2019-01-15 | Stop reason: SDUPTHER

## 2018-12-05 RX ORDER — INSULIN GLARGINE 100 [IU]/ML
100 INJECTION, SOLUTION SUBCUTANEOUS NIGHTLY
Qty: 1 VIAL | Refills: 3 | DISCHARGE
Start: 2018-12-05 | End: 2020-05-27 | Stop reason: SDUPTHER

## 2018-12-05 RX ORDER — ALPRAZOLAM 0.25 MG/1
0.25 TABLET ORAL NIGHTLY PRN
Status: SHIPPED | OUTPATIENT
Start: 2018-12-05 | End: 2019-01-04

## 2018-12-05 RX ADMIN — MEROPENEM 2 G: 1 INJECTION, POWDER, FOR SOLUTION INTRAVENOUS at 09:40

## 2018-12-05 RX ADMIN — TERBINAFINE HYDROCHLORIDE: 1 CREAM TOPICAL at 08:59

## 2018-12-05 RX ADMIN — ALBUTEROL SULFATE 2 PUFF: 90 AEROSOL, METERED RESPIRATORY (INHALATION) at 08:54

## 2018-12-05 RX ADMIN — PANTOPRAZOLE SODIUM 40 MG: 40 TABLET, DELAYED RELEASE ORAL at 05:41

## 2018-12-05 RX ADMIN — TORSEMIDE 20 MG: 20 TABLET ORAL at 08:59

## 2018-12-05 RX ADMIN — FLUTICASONE PROPIONATE 2 SPRAY: 50 SPRAY, METERED NASAL at 08:59

## 2018-12-05 RX ADMIN — DILTIAZEM HYDROCHLORIDE 240 MG: 240 CAPSULE, COATED, EXTENDED RELEASE ORAL at 08:58

## 2018-12-05 RX ADMIN — OXYCODONE AND ACETAMINOPHEN 1 TABLET: 10; 325 TABLET ORAL at 05:41

## 2018-12-05 RX ADMIN — Medication 10 ML: at 08:59

## 2018-12-05 RX ADMIN — INSULIN LISPRO 9 UNITS: 100 INJECTION, SOLUTION INTRAVENOUS; SUBCUTANEOUS at 09:00

## 2018-12-05 RX ADMIN — METOPROLOL TARTRATE 100 MG: 50 TABLET ORAL at 08:58

## 2018-12-05 RX ADMIN — OXYCODONE AND ACETAMINOPHEN 1 TABLET: 10; 325 TABLET ORAL at 13:50

## 2018-12-05 RX ADMIN — GABAPENTIN 400 MG: 400 CAPSULE ORAL at 08:59

## 2018-12-05 RX ADMIN — SODIUM CHLORIDE 250 ML: 9 INJECTION, SOLUTION INTRAVENOUS at 13:00

## 2018-12-05 RX ADMIN — MOMETASONE FUROATE AND FORMOTEROL FUMARATE DIHYDRATE 2 PUFF: 200; 5 AEROSOL RESPIRATORY (INHALATION) at 08:53

## 2018-12-05 RX ADMIN — MICONAZOLE NITRATE: 2 OINTMENT TOPICAL at 08:59

## 2018-12-05 RX ADMIN — FERROUS SULFATE TAB 325 MG (65 MG ELEMENTAL FE) 325 MG: 325 (65 FE) TAB at 08:58

## 2018-12-05 RX ADMIN — SPIRONOLACTONE 25 MG: 25 TABLET ORAL at 08:58

## 2018-12-05 RX ADMIN — ESCITALOPRAM OXALATE 20 MG: 10 TABLET ORAL at 08:58

## 2018-12-05 RX ADMIN — INSULIN LISPRO 3 UNITS: 100 INJECTION, SOLUTION INTRAVENOUS; SUBCUTANEOUS at 12:30

## 2018-12-05 RX ADMIN — APIXABAN 5 MG: 5 TABLET, FILM COATED ORAL at 08:58

## 2018-12-05 RX ADMIN — CETIRIZINE HYDROCHLORIDE 10 MG: 10 TABLET, FILM COATED ORAL at 08:58

## 2018-12-05 RX ADMIN — INSULIN LISPRO 40 UNITS: 100 INJECTION, SOLUTION INTRAVENOUS; SUBCUTANEOUS at 09:03

## 2018-12-05 RX ADMIN — Medication 300 UNITS: at 08:59

## 2018-12-05 RX ADMIN — FENOFIBRATE 160 MG: 160 TABLET ORAL at 08:59

## 2018-12-05 RX ADMIN — GABAPENTIN 400 MG: 400 CAPSULE ORAL at 13:34

## 2018-12-05 RX ADMIN — INSULIN LISPRO 40 UNITS: 100 INJECTION, SOLUTION INTRAVENOUS; SUBCUTANEOUS at 12:28

## 2018-12-05 RX ADMIN — OXYCODONE AND ACETAMINOPHEN 1 TABLET: 10; 325 TABLET ORAL at 00:57

## 2018-12-05 RX ADMIN — OXYCODONE AND ACETAMINOPHEN 1 TABLET: 10; 325 TABLET ORAL at 09:41

## 2018-12-05 ASSESSMENT — PAIN SCALES - GENERAL
PAINLEVEL_OUTOF10: 4
PAINLEVEL_OUTOF10: 9
PAINLEVEL_OUTOF10: 5
PAINLEVEL_OUTOF10: 8
PAINLEVEL_OUTOF10: 3
PAINLEVEL_OUTOF10: 6
PAINLEVEL_OUTOF10: 3
PAINLEVEL_OUTOF10: 5

## 2018-12-05 ASSESSMENT — PAIN DESCRIPTION - PAIN TYPE
TYPE: SURGICAL PAIN

## 2018-12-05 ASSESSMENT — PAIN DESCRIPTION - LOCATION
LOCATION: LEG

## 2018-12-05 ASSESSMENT — PAIN DESCRIPTION - ONSET
ONSET: ON-GOING

## 2018-12-05 ASSESSMENT — PAIN DESCRIPTION - DESCRIPTORS
DESCRIPTORS: SHOOTING;STABBING
DESCRIPTORS: SHOOTING
DESCRIPTORS: CONSTANT;ACHING;DISCOMFORT
DESCRIPTORS: SHOOTING;STABBING
DESCRIPTORS: CONSTANT;ACHING;DISCOMFORT

## 2018-12-05 ASSESSMENT — PAIN DESCRIPTION - PROGRESSION
CLINICAL_PROGRESSION: GRADUALLY WORSENING
CLINICAL_PROGRESSION: NOT CHANGED
CLINICAL_PROGRESSION: NOT CHANGED
CLINICAL_PROGRESSION: GRADUALLY WORSENING
CLINICAL_PROGRESSION: GRADUALLY WORSENING

## 2018-12-05 ASSESSMENT — PAIN DESCRIPTION - ORIENTATION
ORIENTATION: LEFT

## 2018-12-05 ASSESSMENT — PAIN DESCRIPTION - FREQUENCY
FREQUENCY: CONTINUOUS

## 2018-12-05 NOTE — PROGRESS NOTES
Wound Width (cm) 24 cm 11/7/2018  4:26 PM   Wound Depth (cm)  0.2 11/7/2018  4:26 PM   Calculated Wound Size (cm^2) (l*w) 240 cm^2 11/7/2018  4:26 PM   Change in Wound Size % (l*w) 39.74 11/7/2018  4:26 PM   Wound Assessment STEVE 12/3/2018  8:45 PM   Drainage Amount Large 12/4/2018  4:05 PM   Drainage Description Serous 12/4/2018  4:05 PM   Odor Mild 12/4/2018  4:05 PM   Ely-wound Assessment STEVE 12/3/2018  8:45 PM   Number of days: 28       Incision 11/30/18 Leg Left (Active)   Wound Assessment STEVE 12/4/2018  8:00 AM   Ely-wound Assessment STEVE 12/4/2018  8:00 AM   Dressing/Treatment Alginate with Ag;Dry dressing;4x4;Other (Comment) 12/4/2018  8:00 AM   Dressing Changed Changed/New 12/3/2018  7:55 PM   Dressing Status Clean;Dry; Intact 12/4/2018  8:00 AM   Dressing Change Due 12/04/18 12/4/2018  8:00 AM   Number of days: 4       Incision 11/30/18 Foot Left (Active)   Wound Assessment STEVE 12/4/2018  8:00 AM   Ely-wound Assessment STEVE 12/4/2018  8:00 AM   Dressing/Treatment Alginate with Ag;Dry dressing;4x4;Other (Comment) 12/4/2018  8:00 AM   Number of days: 4         CBC with Differential:          Lab Results   Component Value Date     WBC 8.6 12/04/2018     RBC 3.37 12/04/2018     HGB 8.0 12/04/2018     HCT 27.3 12/04/2018      12/04/2018     MCV 81.0 12/04/2018     MCH 23.7 12/04/2018     MCHC 29.3 12/04/2018     RDW 17.4 12/04/2018     NRBC 0.0 12/04/2018     SEGSPCT 85 01/29/2014     METASPCT 3.5 12/04/2018     LYMPHOPCT 17.6 12/04/2018     MONOPCT 6.1 12/04/2018     MYELOPCT 2.6 12/04/2018     BASOPCT 0.9 12/04/2018     MONOSABS 0.52 12/04/2018     LYMPHSABS 1.55 12/04/2018     EOSABS 0.22 12/04/2018     BASOSABS 0.08 12/04/2018      Hemoglobin/Hematocrit:          Lab Results   Component Value Date     HGB 8.0 12/04/2018     HCT 27.3 12/04/2018      CMP:          Lab Results   Component Value Date      12/04/2018     K 4.9 12/04/2018     K 4.7 11/28/2018     CL 92 12/04/2018     CO2 35 12/04/2018     BUN 27 12/04/2018     CREATININE 1.3 12/04/2018     GFRAA 50 12/04/2018     LABGLOM 41 12/04/2018     GLUCOSE 209 12/04/2018     GLUCOSE 181 12/16/2011     PROT 6.9 11/27/2018     LABALBU 3.4 11/27/2018     LABALBU 4.5 11/02/2011     CALCIUM 9.1 12/04/2018     BILITOT 0.4 11/27/2018     ALKPHOS 96 11/27/2018     AST 21 11/27/2018     ALT 17 11/27/2018         Scheduled Meds:  Scheduled Medications    meropenem  2 g Intravenous Q12H    insulin lispro  40 Units Subcutaneous TID WC    heparin flush  3 mL Intravenous 2 times per day    miconazole nitrate   Topical BID    insulin glargine  100 Units Subcutaneous Nightly    insulin lispro  0-18 Units Subcutaneous TID WC    insulin lispro  0-9 Units Subcutaneous Nightly    terbinafine   Topical BID    apixaban  5 mg Oral BID    diltiazem  240 mg Oral BID    mometasone-formoterol  2 puff Inhalation BID    escitalopram  20 mg Oral QAM    fenofibrate  160 mg Oral Daily    ferrous sulfate  325 mg Oral Daily with breakfast    cetirizine  10 mg Oral Daily    fluticasone  2 spray Nasal Daily    gabapentin  400 mg Oral TID    pantoprazole  40 mg Oral QAM AC    metoprolol  100 mg Oral BID    montelukast  10 mg Oral Nightly    spironolactone  25 mg Oral Daily    torsemide  20 mg Oral Daily    traZODone  50 mg Oral Nightly    sodium chloride flush  10 mL Intravenous 2 times per day         Continuous Infusions:  Infusions Meds    dextrose           PRN Meds:.   PRN Medications   sodium chloride flush, heparin flush, acetaminophen, albuterol sulfate HFA, ALPRAZolam, docusate sodium, oxyCODONE-acetaminophen, sodium chloride flush, magnesium hydroxide, ondansetron, glucose, dextrose, glucagon (rDNA), dextrose        Organism  (Abnormal) 11/30/2018  2:58 PM [facility] [address] [facility]   Pseudomonas aeruginosa     Culture Surgical 11/30/2018  2:58 PM [address] Lab   Heavy growth   Identification and sensitivity to follow     Organism  (Abnormal)

## 2018-12-05 NOTE — DISCHARGE SUMMARY
respiratory failure and is on oxygen and was remaining on the same amount of oxygen as previous. Patient denies fevers, chills, headache, vision or hearing changes. She denies dysuria, hematuria. She is feeling okay today. Is still having some pain in the left leg of the surgery site but states it is improving. She is excited for discharge and continuing her rehabilitation with PT and OT. Blood sugars were fairly well controlled while here. Continue current regimen on discharge. Lymphedema was noted. Patient has Tubigrip's in place. Discharge in good condition    Discharge Exam:  Vitals:    12/05/18 1316 12/05/18 1322 12/05/18 1350 12/05/18 1451   BP: (!) 128/58 130/60 (!) 140/62 125/71   Pulse: 81 77 84 86   Resp: 18 18 18 16   Temp: 98.1 °F (36.7 °C) 97.9 °F (36.6 °C) 98.3 °F (36.8 °C) 98 °F (36.7 °C)   TempSrc: Oral Oral Oral Oral   SpO2:       Weight:       Height:           General Appearance: alert and oriented to person, place and time and obese  Skin: warm and dry and bilateral legs with dressings in place with Tubigrip's as well  Pulmonary/Chest: clear to auscultation bilaterally- no wheezes, rales or rhonchi, normal air movement, no respiratory distress  Cardiovascular: normal rate, regular rhythm, normal S1 and S2 and no gallops  Abdomen: soft, non-tender and non-distended  Extremities: no cyanosis, no clubbing and 3 + edema-  bilateral legs  I/O last 3 completed shifts: In: 5 [P.O.:420]  Out: 550 [Urine:550]  No intake/output data recorded.       LABS:  Recent Labs      12/03/18 0545  12/04/18 0630 12/05/18   0530   NA  131*  134  138   K  5.0  4.9  4.7   CL  89*  92*  98   CO2  34*  35*  33*   BUN  24*  27*  30*   CREATININE  1.0  1.3*  1.3*   GLUCOSE  309*  209*  231*   CALCIUM  9.0  9.1  8.2*       Recent Labs      12/03/18   0545  12/04/18 0630 12/05/18   0530   WBC  10.0  8.6  7.6   RBC  3.65  3.37*  2.89*   HGB  8.5*  8.0*  6.8*   HCT  29.7*  27.3*  23.5*   MCV  81.4  81.0  81.3   MCH

## 2018-12-05 NOTE — DISCHARGE INSTR - COC
Continuity of Care Form    Patient Name: Pamela Kaur   :  1955  MRN:  [id number]    Admit date:  2018  Discharge date:  18    Code Status Order: Prior   Advance Directives:   [facility] Documentation     Date/Time Healthcare Directive Type of Healthcare Directive Copy in [facility] [address] Agent's Name Healthcare Agent's Phone Number    18 [phone]  No, patient does not have an advance directive for healthcare treatment -- -- -- -- --    18 1643  No, patient does not have an advance directive for healthcare treatment -- -- -- -- --          Admitting Physician:  Lc Black MD  PCP: Reuben Watkins DO    Discharging Nurse: ALEXEY [facility] Unit/Room#: 0014/8964-T  Discharging Unit Phone Number: [phone]    Emergency Contact:   Extended Emergency Contact Information  Primary Emergency Contact: Nicolas Betancur  Address: [address] Phone: [phone]  Mobile Phone: [phone]  Relation: Child  Secondary Emergency Contact: Thee Betancur  Address: [address] [place] [address] Phone: [phone]  Mobile Phone: [phone]  Relation: Child    Past Surgical History:  Past Surgical History:   Procedure Laterality Date    ANOSCOPY  10/25/2006    injection of anal sphincter with Botox, Franklyn Ortiz/Timmy, [facility]    ANOSCOPY  2007    injection of anal sphincter with Botox, Dr. Luis Funez, [address] [place] [address] ANOSCOPY  2007    injection of anal sphincter with Botox, Drs. Jeanene Closs [facility]    ANUS SURGERY  2006    [facility]  [facility]    anal exam and injection of Botox 100 units into anal sphincter, Drs Jeanene Closs, [facility]    APPENDECTOMY  1965   [facility] COLONOSCOPY  2004    cecal

## 2018-12-07 LAB
EKG ATRIAL RATE: 100 BPM
EKG P AXIS: 64 DEGREES
EKG P-R INTERVAL: 134 MS
EKG Q-T INTERVAL: 372 MS
EKG QRS DURATION: 110 MS
EKG QTC CALCULATION (BAZETT): 479 MS
EKG R AXIS: 29 DEGREES
EKG T AXIS: 55 DEGREES
EKG VENTRICULAR RATE: 100 BPM

## 2018-12-09 LAB
CULTURE SURGICAL: ABNORMAL
GRAM STAIN RESULT: ABNORMAL
ORGANISM: ABNORMAL

## 2018-12-15 ENCOUNTER — APPOINTMENT (OUTPATIENT)
Dept: GENERAL RADIOLOGY | Age: 63
DRG: 194 | End: 2018-12-15
Payer: COMMERCIAL

## 2018-12-15 ENCOUNTER — HOSPITAL ENCOUNTER (INPATIENT)
Age: 63
LOS: 6 days | Discharge: NURSING, REHAB OR LONG-TERM CARE | DRG: 194 | End: 2018-12-21
Attending: EMERGENCY MEDICINE | Admitting: INTERNAL MEDICINE
Payer: COMMERCIAL

## 2018-12-15 DIAGNOSIS — M19.91 PRIMARY OSTEOARTHRITIS, UNSPECIFIED SITE: ICD-10-CM

## 2018-12-15 DIAGNOSIS — D64.9 CHRONIC ANEMIA: ICD-10-CM

## 2018-12-15 DIAGNOSIS — R09.02 HYPOXIA: ICD-10-CM

## 2018-12-15 DIAGNOSIS — D64.9 LOW HEMOGLOBIN: ICD-10-CM

## 2018-12-15 DIAGNOSIS — I50.31 ACUTE DIASTOLIC CONGESTIVE HEART FAILURE (HCC): Primary | ICD-10-CM

## 2018-12-15 LAB
ABO/RH: NORMAL
ABO/RH: NORMAL
ALBUMIN SERPL-MCNC: 3.4 G/DL (ref 3.5–5.2)
ALP BLD-CCNC: 74 U/L (ref 35–104)
ALT SERPL-CCNC: 14 U/L (ref 0–32)
ANION GAP SERPL CALCULATED.3IONS-SCNC: 9 MMOL/L (ref 7–16)
ANISOCYTOSIS: ABNORMAL
ANTIBODY SCREEN: NORMAL
AST SERPL-CCNC: 17 U/L (ref 0–31)
B.E.: 13.1 MMOL/L (ref -3–3)
BASOPHILS ABSOLUTE: 0.03 E9/L (ref 0–0.2)
BASOPHILS RELATIVE PERCENT: 0.7 % (ref 0–2)
BILIRUB SERPL-MCNC: 0.3 MG/DL (ref 0–1.2)
BUN BLDV-MCNC: 30 MG/DL (ref 8–23)
CALCIUM SERPL-MCNC: 8.8 MG/DL (ref 8.6–10.2)
CHLORIDE BLD-SCNC: 89 MMOL/L (ref 98–107)
CO2: 37 MMOL/L (ref 22–29)
COHB: 1.4 % (ref 0–1.5)
CREAT SERPL-MCNC: 1.2 MG/DL (ref 0.5–1)
CRITICAL: ABNORMAL
DATE ANALYZED: ABNORMAL
DATE OF COLLECTION: ABNORMAL
EKG ATRIAL RATE: 91 BPM
EKG P AXIS: 61 DEGREES
EKG P-R INTERVAL: 136 MS
EKG Q-T INTERVAL: 374 MS
EKG QRS DURATION: 104 MS
EKG QTC CALCULATION (BAZETT): 460 MS
EKG R AXIS: 50 DEGREES
EKG T AXIS: 35 DEGREES
EKG VENTRICULAR RATE: 91 BPM
EOSINOPHILS ABSOLUTE: 0.21 E9/L (ref 0.05–0.5)
EOSINOPHILS RELATIVE PERCENT: 4.6 % (ref 0–6)
GFR AFRICAN AMERICAN: 55
GFR NON-AFRICAN AMERICAN: 45 ML/MIN/1.73
GLUCOSE BLD-MCNC: 244 MG/DL (ref 74–99)
HCO3: 39.9 MMOL/L (ref 22–26)
HCT VFR BLD CALC: 23.3 % (ref 34–48)
HEMOGLOBIN: 6.5 G/DL (ref 11.5–15.5)
HHB: 6.5 % (ref 0–5)
HYPOCHROMIA: ABNORMAL
IMMATURE GRANULOCYTES #: 0.01 E9/L
IMMATURE GRANULOCYTES %: 0.2 % (ref 0–5)
LAB: ABNORMAL
LYMPHOCYTES ABSOLUTE: 0.83 E9/L (ref 1.5–4)
LYMPHOCYTES RELATIVE PERCENT: 18.1 % (ref 20–42)
Lab: ABNORMAL
MCH RBC QN AUTO: 23.5 PG (ref 26–35)
MCHC RBC AUTO-ENTMCNC: 27.9 % (ref 32–34.5)
MCV RBC AUTO: 84.1 FL (ref 80–99.9)
METHB: 0.4 % (ref 0–1.5)
MODE: ABNORMAL
MONOCYTES ABSOLUTE: 0.43 E9/L (ref 0.1–0.95)
MONOCYTES RELATIVE PERCENT: 9.4 % (ref 2–12)
NEUTROPHILS ABSOLUTE: 3.07 E9/L (ref 1.8–7.3)
NEUTROPHILS RELATIVE PERCENT: 67 % (ref 43–80)
O2 CONTENT: 10 ML/DL
O2 SATURATION: 93.4 % (ref 92–98.5)
O2HB: 91.7 % (ref 94–97)
OPERATOR ID: ABNORMAL
PATIENT TEMP: 37 C
PCO2: 69.4 MMHG (ref 35–45)
PDW BLD-RTO: 17.6 FL (ref 11.5–15)
PH BLOOD GAS: 7.38 (ref 7.35–7.45)
PLATELET # BLD: 330 E9/L (ref 130–450)
PMV BLD AUTO: 9.7 FL (ref 7–12)
PO2: 71.3 MMHG (ref 60–100)
POLYCHROMASIA: ABNORMAL
POTASSIUM SERPL-SCNC: 4.7 MMOL/L (ref 3.5–5)
PRO-BNP: 1421 PG/ML (ref 0–125)
RBC # BLD: 2.77 E12/L (ref 3.5–5.5)
SODIUM BLD-SCNC: 135 MMOL/L (ref 132–146)
SOURCE, BLOOD GAS: ABNORMAL
THB: 7.7 G/DL (ref 11.5–16.5)
TIME ANALYZED: 2055
TOTAL PROTEIN: 6.5 G/DL (ref 6.4–8.3)
WBC # BLD: 4.6 E9/L (ref 4.5–11.5)

## 2018-12-15 PROCEDURE — 86900 BLOOD TYPING SEROLOGIC ABO: CPT

## 2018-12-15 PROCEDURE — 36415 COLL VENOUS BLD VENIPUNCTURE: CPT

## 2018-12-15 PROCEDURE — 6360000002 HC RX W HCPCS: Performed by: PHYSICIAN ASSISTANT

## 2018-12-15 PROCEDURE — 86850 RBC ANTIBODY SCREEN: CPT

## 2018-12-15 PROCEDURE — P9016 RBC LEUKOCYTES REDUCED: HCPCS

## 2018-12-15 PROCEDURE — 86923 COMPATIBILITY TEST ELECTRIC: CPT

## 2018-12-15 PROCEDURE — 96374 THER/PROPH/DIAG INJ IV PUSH: CPT

## 2018-12-15 PROCEDURE — 71045 X-RAY EXAM CHEST 1 VIEW: CPT

## 2018-12-15 PROCEDURE — 85025 COMPLETE CBC W/AUTO DIFF WBC: CPT

## 2018-12-15 PROCEDURE — 94664 DEMO&/EVAL PT USE INHALER: CPT

## 2018-12-15 PROCEDURE — 99285 EMERGENCY DEPT VISIT HI MDM: CPT

## 2018-12-15 PROCEDURE — 6370000000 HC RX 637 (ALT 250 FOR IP): Performed by: PHYSICIAN ASSISTANT

## 2018-12-15 PROCEDURE — 80053 COMPREHEN METABOLIC PANEL: CPT

## 2018-12-15 PROCEDURE — 86901 BLOOD TYPING SEROLOGIC RH(D): CPT

## 2018-12-15 PROCEDURE — 83880 ASSAY OF NATRIURETIC PEPTIDE: CPT

## 2018-12-15 PROCEDURE — 2060000000 HC ICU INTERMEDIATE R&B

## 2018-12-15 PROCEDURE — 93005 ELECTROCARDIOGRAM TRACING: CPT | Performed by: PHYSICIAN ASSISTANT

## 2018-12-15 PROCEDURE — 2580000003 HC RX 258: Performed by: PHYSICIAN ASSISTANT

## 2018-12-15 PROCEDURE — 36430 TRANSFUSION BLD/BLD COMPNT: CPT

## 2018-12-15 PROCEDURE — 94660 CPAP INITIATION&MGMT: CPT

## 2018-12-15 PROCEDURE — 2580000003 HC RX 258: Performed by: INTERNAL MEDICINE

## 2018-12-15 PROCEDURE — 82805 BLOOD GASES W/O2 SATURATION: CPT

## 2018-12-15 PROCEDURE — 94640 AIRWAY INHALATION TREATMENT: CPT

## 2018-12-15 RX ORDER — FUROSEMIDE 10 MG/ML
20 INJECTION INTRAMUSCULAR; INTRAVENOUS ONCE
Status: COMPLETED | OUTPATIENT
Start: 2018-12-15 | End: 2018-12-15

## 2018-12-15 RX ORDER — SODIUM CHLORIDE 0.9 % (FLUSH) 0.9 %
10 SYRINGE (ML) INJECTION PRN
Status: DISCONTINUED | OUTPATIENT
Start: 2018-12-15 | End: 2018-12-17 | Stop reason: SDUPTHER

## 2018-12-15 RX ORDER — ACETAMINOPHEN 325 MG/1
650 TABLET ORAL EVERY 4 HOURS PRN
Status: DISCONTINUED | OUTPATIENT
Start: 2018-12-15 | End: 2018-12-21 | Stop reason: HOSPADM

## 2018-12-15 RX ORDER — IPRATROPIUM BROMIDE AND ALBUTEROL SULFATE 2.5; .5 MG/3ML; MG/3ML
1 SOLUTION RESPIRATORY (INHALATION)
Status: COMPLETED | OUTPATIENT
Start: 2018-12-15 | End: 2018-12-15

## 2018-12-15 RX ORDER — SODIUM CHLORIDE 0.9 % (FLUSH) 0.9 %
10 SYRINGE (ML) INJECTION EVERY 12 HOURS SCHEDULED
Status: DISCONTINUED | OUTPATIENT
Start: 2018-12-15 | End: 2018-12-17 | Stop reason: SDUPTHER

## 2018-12-15 RX ORDER — 0.9 % SODIUM CHLORIDE 0.9 %
250 INTRAVENOUS SOLUTION INTRAVENOUS ONCE
Status: COMPLETED | OUTPATIENT
Start: 2018-12-15 | End: 2018-12-16

## 2018-12-15 RX ADMIN — FUROSEMIDE 20 MG: 10 INJECTION, SOLUTION INTRAVENOUS at 21:11

## 2018-12-15 RX ADMIN — IPRATROPIUM BROMIDE AND ALBUTEROL SULFATE 1 AMPULE: .5; 3 SOLUTION RESPIRATORY (INHALATION) at 21:33

## 2018-12-15 RX ADMIN — SODIUM CHLORIDE 250 ML: 9 INJECTION, SOLUTION INTRAVENOUS at 20:42

## 2018-12-15 RX ADMIN — IPRATROPIUM BROMIDE AND ALBUTEROL SULFATE 1 AMPULE: .5; 3 SOLUTION RESPIRATORY (INHALATION) at 21:14

## 2018-12-15 RX ADMIN — Medication 10 ML: at 23:45

## 2018-12-15 RX ADMIN — IPRATROPIUM BROMIDE AND ALBUTEROL SULFATE 1 AMPULE: .5; 3 SOLUTION RESPIRATORY (INHALATION) at 21:24

## 2018-12-15 RX ADMIN — FUROSEMIDE 20 MG: 10 INJECTION, SOLUTION INTRAVENOUS at 22:47

## 2018-12-15 ASSESSMENT — PAIN SCALES - GENERAL
PAINLEVEL_OUTOF10: 0
PAINLEVEL_OUTOF10: 6

## 2018-12-15 ASSESSMENT — PAIN DESCRIPTION - LOCATION: LOCATION: FOOT

## 2018-12-15 ASSESSMENT — PAIN DESCRIPTION - PAIN TYPE: TYPE: CHRONIC PAIN

## 2018-12-15 ASSESSMENT — PAIN DESCRIPTION - FREQUENCY: FREQUENCY: CONTINUOUS

## 2018-12-15 NOTE — ED NOTES
Lab called with hemoglobin of 6.5, PA notified, PRBC's ordered awaiting call for blood to be ready, no calls thus far per [facility], RN  12/15/18 2952

## 2018-12-15 NOTE — ED PROVIDER NOTES
ED Attending  CC: Renée       Department of Emergency Medicine   ED  Provider Note  Admit Date/RoomTime: 12/15/2018  5:46 PM  ED Room: 20/20  MRN: [id number]  Chief Complaint:   Other (hemoglobin 6.3 today)       History of Present Illness   Source of history provided by:  patient. History/Exam Limitations: none. Philly Rodgers is a 70 y.o. female who has a past medical history of:   Past Medical History:   Diagnosis Date    Anal fissure     Anemia 10/6/2017    Anxiety and depression     Arthritis     Asthma     [facility]     Blood transfusion     long time no reaction    [facility]est[facility] failure[facility])     Diastolic    COPD (chronic obstructive pulmonary disease) [facility]     Disc disorder     DM2 (diabetes mellitus, type 2) ([facility]     on insulin    Fall due to stumbling 10/6/2017    GERD (gastroesophageal reflux disease)     Hx of blood clots     Hyperlipidemia     Hypertension     Inappropriate sinus tachycardia     LVH (left ventricular hypertrophy)     moderate    Lymphadenitis, chronic 4/13/2018    Occipital neuralgia 11/2/2011    Respiratory acidosis 10/7/2017    Sinus tachycardia 2/16/2015    presents to the ED by ambulance where the patient received see Ambulance Run Sheet prior to arrival. for A low hemoglobin noted today from [facility] rehabilitation home, beginning today. The patient has a history of chronic anemia and has received a blood transfusion last month. The patient's hemoglobin is usually around 8.0. The patient had a level drawn at the home today which was 6.3 therefore they sent her in for another blood transfusion. The patient has a PICC line that is regularly used. The patient is normally on 3 L of oxygen daily. The patient has had an endoscopy and colonoscopy within the last 5 years. The patient denies any active bleeding at this time. The complaint has been persistent, mild in severity.      ROS   Pertinent positives and negatives are stated within Range    Ventricular Rate 91 BPM    Atrial Rate 91 BPM    P-R Interval 136 ms    QRS Duration 104 ms    Q-T Interval 374 ms    QTc Calculation (Bazett) 460 ms    P Axis 61 degrees    R Axis 50 degrees    T Axis 35 degrees   TYPE AND SCREEN   Result Value Ref Range    ABO/Rh CANCELED     Antibody Screen NEG    PREPARE RBC (CROSSMATCH) Number of Units: 1, 1 Units   Result Value Ref Range    Product Code Blood Bank [id number]     Description Blood Bank Red Blood Cells, Leuko-reduced     Unit Number [id number]     Dispense Status Blood Bank issued     Product Code Blood Bank [id number]     Description Blood Bank Red Blood Cells, Leuko-reduced     Unit Number [id number]     Dispense Status Blood Bank selected    ABORH TUBE   Result Value Ref Range    ABO/Rh AB POS      Imaging: All Radiology results interpreted by Radiologist unless otherwise noted. XR CHEST PORTABLE   Final Result      1. Cardiomegaly associated with perihilar vascular congestion and   interstitial thickening with airspace opacities could represent edema   or infiltrates. Clinical correlation is recommended. EKG #1:  Interpreted by emergency department physician unless otherwise noted. Time:  1950    Rate: 91  Rhythm: Sinus. Interpretation: normal EKG, normal sinus rhythm. ED Course / Medical Decision Making     Medications   0.9 % sodium chloride bolus (250 mLs Intravenous New Bag 12/15/18 2042)   furosemide (LASIX) injection 20 mg (not administered)   furosemide (LASIX) injection 20 mg (20 mg Intravenous Given 12/15/18 2111)   ipratropium-albuterol (DUONEB) nebulizer solution 1 ampule (1 ampule Inhalation Given 12/15/18 2133)        Re-Evaluations:  12/15/18      Time: Patient was seen by my attending and agrees with the plan of management    2100 I was called to the room due to the patient having to urinate and wanting a Hansen catheter. With the assistance of 2 nurses a Hansen catheter was placed.  The patients head was

## 2018-12-16 PROBLEM — I50.33 ACUTE ON CHRONIC DIASTOLIC HEART FAILURE (HCC): Status: ACTIVE | Noted: 2018-12-15

## 2018-12-16 PROBLEM — D64.9 ACUTE ON CHRONIC ANEMIA: Status: ACTIVE | Noted: 2018-12-16

## 2018-12-16 PROBLEM — J96.21 ACUTE ON CHRONIC RESPIRATORY FAILURE WITH HYPOXIA (HCC): Status: ACTIVE | Noted: 2018-11-06

## 2018-12-16 LAB
ANION GAP SERPL CALCULATED.3IONS-SCNC: 5 MMOL/L (ref 7–16)
ANISOCYTOSIS: ABNORMAL
BASOPHILS ABSOLUTE: 0.04 E9/L (ref 0–0.2)
BASOPHILS RELATIVE PERCENT: 0.8 % (ref 0–2)
BUN BLDV-MCNC: 25 MG/DL (ref 8–23)
C-REACTIVE PROTEIN: 4.9 MG/DL (ref 0–0.4)
CALCIUM SERPL-MCNC: 8.8 MG/DL (ref 8.6–10.2)
CHLORIDE BLD-SCNC: 99 MMOL/L (ref 98–107)
CO2: 41 MMOL/L (ref 22–29)
CREAT SERPL-MCNC: 1 MG/DL (ref 0.5–1)
EOSINOPHILS ABSOLUTE: 0.06 E9/L (ref 0.05–0.5)
EOSINOPHILS RELATIVE PERCENT: 1.2 % (ref 0–6)
GFR AFRICAN AMERICAN: >60
GFR NON-AFRICAN AMERICAN: 56 ML/MIN/1.73
GLUCOSE BLD-MCNC: 114 MG/DL (ref 74–99)
HCT VFR BLD CALC: 25.2 % (ref 34–48)
HCT VFR BLD CALC: 25.6 % (ref 34–48)
HEMOGLOBIN: 7.2 G/DL (ref 11.5–15.5)
HEMOGLOBIN: 7.4 G/DL (ref 11.5–15.5)
HYPOCHROMIA: ABNORMAL
IMMATURE GRANULOCYTES #: 0.02 E9/L
IMMATURE GRANULOCYTES %: 0.4 % (ref 0–5)
LYMPHOCYTES ABSOLUTE: 0.84 E9/L (ref 1.5–4)
LYMPHOCYTES RELATIVE PERCENT: 17.2 % (ref 20–42)
MAGNESIUM: 2.2 MG/DL (ref 1.6–2.6)
MCH RBC QN AUTO: 24.1 PG (ref 26–35)
MCHC RBC AUTO-ENTMCNC: 28.9 % (ref 32–34.5)
MCV RBC AUTO: 83.4 FL (ref 80–99.9)
METER GLUCOSE: 120 MG/DL (ref 74–99)
METER GLUCOSE: 128 MG/DL (ref 74–99)
METER GLUCOSE: 143 MG/DL (ref 74–99)
METER GLUCOSE: 164 MG/DL (ref 74–99)
METER GLUCOSE: 244 MG/DL (ref 74–99)
MONOCYTES ABSOLUTE: 0.52 E9/L (ref 0.1–0.95)
MONOCYTES RELATIVE PERCENT: 10.6 % (ref 2–12)
NEUTROPHILS ABSOLUTE: 3.41 E9/L (ref 1.8–7.3)
NEUTROPHILS RELATIVE PERCENT: 69.8 % (ref 43–80)
PDW BLD-RTO: 17.4 FL (ref 11.5–15)
PLATELET # BLD: 302 E9/L (ref 130–450)
PMV BLD AUTO: 9.2 FL (ref 7–12)
POTASSIUM REFLEX MAGNESIUM: 4.6 MMOL/L (ref 3.5–5)
PRO-BNP: 1544 PG/ML (ref 0–125)
PROCALCITONIN: 0.25 NG/ML (ref 0–0.08)
RBC # BLD: 3.07 E12/L (ref 3.5–5.5)
SODIUM BLD-SCNC: 145 MMOL/L (ref 132–146)
WBC # BLD: 4.9 E9/L (ref 4.5–11.5)

## 2018-12-16 PROCEDURE — 87450 HC DIRECT STREP B ANTIGEN: CPT

## 2018-12-16 PROCEDURE — 82962 GLUCOSE BLOOD TEST: CPT

## 2018-12-16 PROCEDURE — 85014 HEMATOCRIT: CPT

## 2018-12-16 PROCEDURE — 94640 AIRWAY INHALATION TREATMENT: CPT

## 2018-12-16 PROCEDURE — 6360000002 HC RX W HCPCS: Performed by: CLINICAL NURSE SPECIALIST

## 2018-12-16 PROCEDURE — 2060000000 HC ICU INTERMEDIATE R&B

## 2018-12-16 PROCEDURE — 6360000002 HC RX W HCPCS: Performed by: NURSE PRACTITIONER

## 2018-12-16 PROCEDURE — 80048 BASIC METABOLIC PNL TOTAL CA: CPT

## 2018-12-16 PROCEDURE — 36415 COLL VENOUS BLD VENIPUNCTURE: CPT

## 2018-12-16 PROCEDURE — 85025 COMPLETE CBC W/AUTO DIFF WBC: CPT

## 2018-12-16 PROCEDURE — 6370000000 HC RX 637 (ALT 250 FOR IP): Performed by: INTERNAL MEDICINE

## 2018-12-16 PROCEDURE — 6360000002 HC RX W HCPCS: Performed by: INTERNAL MEDICINE

## 2018-12-16 PROCEDURE — APPSS180 APP SPLIT SHARED TIME > 60 MINUTES: Performed by: NURSE PRACTITIONER

## 2018-12-16 PROCEDURE — 86140 C-REACTIVE PROTEIN: CPT

## 2018-12-16 PROCEDURE — 84145 PROCALCITONIN (PCT): CPT

## 2018-12-16 PROCEDURE — 99255 IP/OBS CONSLTJ NEW/EST HI 80: CPT | Performed by: INTERNAL MEDICINE

## 2018-12-16 PROCEDURE — 99223 1ST HOSP IP/OBS HIGH 75: CPT | Performed by: INTERNAL MEDICINE

## 2018-12-16 PROCEDURE — 2580000003 HC RX 258: Performed by: INTERNAL MEDICINE

## 2018-12-16 PROCEDURE — 2700000000 HC OXYGEN THERAPY PER DAY

## 2018-12-16 PROCEDURE — 87581 M.PNEUMON DNA AMP PROBE: CPT

## 2018-12-16 PROCEDURE — 36592 COLLECT BLOOD FROM PICC: CPT

## 2018-12-16 PROCEDURE — 83880 ASSAY OF NATRIURETIC PEPTIDE: CPT

## 2018-12-16 PROCEDURE — 85018 HEMOGLOBIN: CPT

## 2018-12-16 PROCEDURE — 94660 CPAP INITIATION&MGMT: CPT

## 2018-12-16 PROCEDURE — 87633 RESP VIRUS 12-25 TARGETS: CPT

## 2018-12-16 PROCEDURE — 6370000000 HC RX 637 (ALT 250 FOR IP): Performed by: CLINICAL NURSE SPECIALIST

## 2018-12-16 PROCEDURE — 83735 ASSAY OF MAGNESIUM: CPT

## 2018-12-16 PROCEDURE — 87486 CHLMYD PNEUM DNA AMP PROBE: CPT

## 2018-12-16 PROCEDURE — 87798 DETECT AGENT NOS DNA AMP: CPT

## 2018-12-16 PROCEDURE — 94664 DEMO&/EVAL PT USE INHALER: CPT

## 2018-12-16 RX ORDER — METOPROLOL TARTRATE 50 MG/1
100 TABLET, FILM COATED ORAL 2 TIMES DAILY
Status: DISCONTINUED | OUTPATIENT
Start: 2018-12-16 | End: 2018-12-21 | Stop reason: HOSPADM

## 2018-12-16 RX ORDER — IPRATROPIUM BROMIDE AND ALBUTEROL SULFATE 2.5; .5 MG/3ML; MG/3ML
1 SOLUTION RESPIRATORY (INHALATION) EVERY 4 HOURS PRN
Status: DISCONTINUED | OUTPATIENT
Start: 2018-12-16 | End: 2018-12-21 | Stop reason: HOSPADM

## 2018-12-16 RX ORDER — TRAMADOL HYDROCHLORIDE 50 MG/1
50 TABLET ORAL EVERY 6 HOURS PRN
Status: DISCONTINUED | OUTPATIENT
Start: 2018-12-16 | End: 2018-12-21 | Stop reason: HOSPADM

## 2018-12-16 RX ORDER — FLUTICASONE PROPIONATE 50 MCG
2 SPRAY, SUSPENSION (ML) NASAL DAILY
Status: DISCONTINUED | OUTPATIENT
Start: 2018-12-16 | End: 2018-12-21 | Stop reason: HOSPADM

## 2018-12-16 RX ORDER — NYSTATIN 100000 U/G
CREAM TOPICAL 2 TIMES DAILY
Status: DISCONTINUED | OUTPATIENT
Start: 2018-12-16 | End: 2018-12-21 | Stop reason: HOSPADM

## 2018-12-16 RX ORDER — CETIRIZINE HYDROCHLORIDE 10 MG/1
10 TABLET ORAL DAILY
Status: DISCONTINUED | OUTPATIENT
Start: 2018-12-16 | End: 2018-12-21 | Stop reason: HOSPADM

## 2018-12-16 RX ORDER — SPIRONOLACTONE 25 MG/1
25 TABLET ORAL DAILY
Status: DISCONTINUED | OUTPATIENT
Start: 2018-12-16 | End: 2018-12-21 | Stop reason: HOSPADM

## 2018-12-16 RX ORDER — INSULIN GLARGINE 100 [IU]/ML
100 INJECTION, SOLUTION SUBCUTANEOUS NIGHTLY
Status: DISCONTINUED | OUTPATIENT
Start: 2018-12-16 | End: 2018-12-21 | Stop reason: HOSPADM

## 2018-12-16 RX ORDER — TORSEMIDE 20 MG/1
20 TABLET ORAL DAILY
Status: DISCONTINUED | OUTPATIENT
Start: 2018-12-16 | End: 2018-12-16

## 2018-12-16 RX ORDER — BUDESONIDE 0.5 MG/2ML
500 INHALANT ORAL 2 TIMES DAILY
Status: DISCONTINUED | OUTPATIENT
Start: 2018-12-16 | End: 2018-12-21 | Stop reason: HOSPADM

## 2018-12-16 RX ORDER — SODIUM CHLORIDE 0.9 % (FLUSH) 0.9 %
10 SYRINGE (ML) INJECTION PRN
Status: DISCONTINUED | OUTPATIENT
Start: 2018-12-16 | End: 2018-12-21 | Stop reason: HOSPADM

## 2018-12-16 RX ORDER — ONDANSETRON 2 MG/ML
4 INJECTION INTRAMUSCULAR; INTRAVENOUS EVERY 6 HOURS PRN
Status: DISCONTINUED | OUTPATIENT
Start: 2018-12-16 | End: 2018-12-21 | Stop reason: HOSPADM

## 2018-12-16 RX ORDER — FUROSEMIDE 10 MG/ML
40 INJECTION INTRAMUSCULAR; INTRAVENOUS 2 TIMES DAILY
Status: DISCONTINUED | OUTPATIENT
Start: 2018-12-16 | End: 2018-12-17

## 2018-12-16 RX ORDER — ALBUTEROL SULFATE 90 UG/1
2 AEROSOL, METERED RESPIRATORY (INHALATION) EVERY 4 HOURS PRN
Status: DISCONTINUED | OUTPATIENT
Start: 2018-12-16 | End: 2018-12-21 | Stop reason: HOSPADM

## 2018-12-16 RX ORDER — FERROUS SULFATE 325(65) MG
325 TABLET ORAL
Status: DISCONTINUED | OUTPATIENT
Start: 2018-12-16 | End: 2018-12-21 | Stop reason: HOSPADM

## 2018-12-16 RX ORDER — FENOFIBRATE 160 MG/1
160 TABLET ORAL DAILY
Status: DISCONTINUED | OUTPATIENT
Start: 2018-12-16 | End: 2018-12-21 | Stop reason: HOSPADM

## 2018-12-16 RX ORDER — SODIUM CHLORIDE 0.9 % (FLUSH) 0.9 %
10 SYRINGE (ML) INJECTION EVERY 12 HOURS SCHEDULED
Status: DISCONTINUED | OUTPATIENT
Start: 2018-12-16 | End: 2018-12-21 | Stop reason: HOSPADM

## 2018-12-16 RX ORDER — PANTOPRAZOLE SODIUM 40 MG/1
40 TABLET, DELAYED RELEASE ORAL
Status: DISCONTINUED | OUTPATIENT
Start: 2018-12-16 | End: 2018-12-21 | Stop reason: HOSPADM

## 2018-12-16 RX ORDER — FUROSEMIDE 10 MG/ML
40 INJECTION INTRAMUSCULAR; INTRAVENOUS 2 TIMES DAILY
Status: DISCONTINUED | OUTPATIENT
Start: 2018-12-16 | End: 2018-12-16

## 2018-12-16 RX ORDER — DILTIAZEM HYDROCHLORIDE 240 MG/1
240 CAPSULE, COATED, EXTENDED RELEASE ORAL 2 TIMES DAILY
Status: DISCONTINUED | OUTPATIENT
Start: 2018-12-16 | End: 2018-12-21 | Stop reason: HOSPADM

## 2018-12-16 RX ORDER — DOCUSATE SODIUM 100 MG/1
100 CAPSULE, LIQUID FILLED ORAL 3 TIMES DAILY PRN
Status: DISCONTINUED | OUTPATIENT
Start: 2018-12-16 | End: 2018-12-21 | Stop reason: HOSPADM

## 2018-12-16 RX ORDER — FUROSEMIDE 10 MG/ML
20 INJECTION INTRAMUSCULAR; INTRAVENOUS ONCE
Status: COMPLETED | OUTPATIENT
Start: 2018-12-16 | End: 2018-12-16

## 2018-12-16 RX ORDER — TRAZODONE HYDROCHLORIDE 50 MG/1
50 TABLET ORAL NIGHTLY
Status: DISCONTINUED | OUTPATIENT
Start: 2018-12-16 | End: 2018-12-21 | Stop reason: HOSPADM

## 2018-12-16 RX ORDER — PRENATAL VIT 91/IRON/FOLIC/DHA 28-975-200
COMBINATION PACKAGE (EA) ORAL 2 TIMES DAILY
Status: DISCONTINUED | OUTPATIENT
Start: 2018-12-16 | End: 2018-12-21 | Stop reason: HOSPADM

## 2018-12-16 RX ORDER — IPRATROPIUM BROMIDE AND ALBUTEROL SULFATE 2.5; .5 MG/3ML; MG/3ML
1 SOLUTION RESPIRATORY (INHALATION)
Status: DISCONTINUED | OUTPATIENT
Start: 2018-12-16 | End: 2018-12-21 | Stop reason: HOSPADM

## 2018-12-16 RX ORDER — FORMOTEROL FUMARATE 20 UG/2ML
20 SOLUTION RESPIRATORY (INHALATION) EVERY 12 HOURS
Status: DISCONTINUED | OUTPATIENT
Start: 2018-12-16 | End: 2018-12-21 | Stop reason: HOSPADM

## 2018-12-16 RX ORDER — ESCITALOPRAM OXALATE 10 MG/1
20 TABLET ORAL EVERY MORNING
Status: DISCONTINUED | OUTPATIENT
Start: 2018-12-16 | End: 2018-12-21 | Stop reason: HOSPADM

## 2018-12-16 RX ORDER — ALPRAZOLAM 0.25 MG/1
0.25 TABLET ORAL NIGHTLY PRN
Status: DISCONTINUED | OUTPATIENT
Start: 2018-12-16 | End: 2018-12-21 | Stop reason: HOSPADM

## 2018-12-16 RX ADMIN — METOPROLOL TARTRATE 100 MG: 50 TABLET ORAL at 20:42

## 2018-12-16 RX ADMIN — SPIRONOLACTONE 25 MG: 25 TABLET ORAL at 08:30

## 2018-12-16 RX ADMIN — TORSEMIDE 20 MG: 20 TABLET ORAL at 08:30

## 2018-12-16 RX ADMIN — TERBINAFINE HYDROCHLORIDE: 1 CREAM TOPICAL at 02:30

## 2018-12-16 RX ADMIN — FUROSEMIDE 20 MG: 10 INJECTION, SOLUTION INTRAVENOUS at 09:33

## 2018-12-16 RX ADMIN — TERBINAFINE HYDROCHLORIDE: 1 CREAM TOPICAL at 20:51

## 2018-12-16 RX ADMIN — TRAMADOL HYDROCHLORIDE 50 MG: 50 TABLET, FILM COATED ORAL at 13:37

## 2018-12-16 RX ADMIN — DILTIAZEM HYDROCHLORIDE 240 MG: 240 CAPSULE, COATED, EXTENDED RELEASE ORAL at 20:42

## 2018-12-16 RX ADMIN — DOCUSATE SODIUM 100 MG: 100 CAPSULE, LIQUID FILLED ORAL at 20:43

## 2018-12-16 RX ADMIN — NYSTATIN: 100000 CREAM TOPICAL at 08:31

## 2018-12-16 RX ADMIN — IPRATROPIUM BROMIDE AND ALBUTEROL SULFATE 1 AMPULE: .5; 3 SOLUTION RESPIRATORY (INHALATION) at 20:27

## 2018-12-16 RX ADMIN — DILTIAZEM HYDROCHLORIDE 240 MG: 240 CAPSULE, COATED, EXTENDED RELEASE ORAL at 02:21

## 2018-12-16 RX ADMIN — TRAMADOL HYDROCHLORIDE 50 MG: 50 TABLET, FILM COATED ORAL at 22:21

## 2018-12-16 RX ADMIN — FENOFIBRATE 160 MG: 160 TABLET ORAL at 08:30

## 2018-12-16 RX ADMIN — Medication 10 ML: at 07:00

## 2018-12-16 RX ADMIN — Medication 10 ML: at 11:58

## 2018-12-16 RX ADMIN — MOMETASONE FUROATE AND FORMOTEROL FUMARATE DIHYDRATE 2 PUFF: 200; 5 AEROSOL RESPIRATORY (INHALATION) at 07:57

## 2018-12-16 RX ADMIN — Medication 10 ML: at 00:42

## 2018-12-16 RX ADMIN — FERROUS SULFATE TAB 325 MG (65 MG ELEMENTAL FE) 325 MG: 325 (65 FE) TAB at 08:30

## 2018-12-16 RX ADMIN — MEROPENEM 2 G: 1 INJECTION, POWDER, FOR SOLUTION INTRAVENOUS at 02:38

## 2018-12-16 RX ADMIN — METOPROLOL TARTRATE 100 MG: 50 TABLET ORAL at 08:30

## 2018-12-16 RX ADMIN — TRAZODONE HYDROCHLORIDE 50 MG: 50 TABLET ORAL at 20:42

## 2018-12-16 RX ADMIN — BUDESONIDE 500 MCG: 0.5 INHALANT RESPIRATORY (INHALATION) at 20:26

## 2018-12-16 RX ADMIN — INSULIN GLARGINE 100 UNITS: 100 INJECTION, SOLUTION SUBCUTANEOUS at 02:25

## 2018-12-16 RX ADMIN — CETIRIZINE HYDROCHLORIDE 10 MG: 10 TABLET, FILM COATED ORAL at 08:30

## 2018-12-16 RX ADMIN — PANTOPRAZOLE SODIUM 40 MG: 40 TABLET, DELAYED RELEASE ORAL at 07:00

## 2018-12-16 RX ADMIN — INSULIN GLARGINE 40 UNITS: 100 INJECTION, SOLUTION SUBCUTANEOUS at 20:43

## 2018-12-16 RX ADMIN — TERBINAFINE HYDROCHLORIDE: 1 CREAM TOPICAL at 08:32

## 2018-12-16 RX ADMIN — Medication 10 ML: at 02:24

## 2018-12-16 RX ADMIN — Medication 10 ML: at 07:10

## 2018-12-16 RX ADMIN — Medication 10 ML: at 20:50

## 2018-12-16 RX ADMIN — APIXABAN 5 MG: 5 TABLET, FILM COATED ORAL at 08:30

## 2018-12-16 RX ADMIN — IPRATROPIUM BROMIDE AND ALBUTEROL SULFATE 1 AMPULE: .5; 3 SOLUTION RESPIRATORY (INHALATION) at 16:02

## 2018-12-16 RX ADMIN — APIXABAN 5 MG: 5 TABLET, FILM COATED ORAL at 02:21

## 2018-12-16 RX ADMIN — FUROSEMIDE 40 MG: 10 INJECTION, SOLUTION INTRAMUSCULAR; INTRAVENOUS at 17:22

## 2018-12-16 RX ADMIN — NYSTATIN: 100000 CREAM TOPICAL at 02:30

## 2018-12-16 RX ADMIN — METOPROLOL TARTRATE 100 MG: 50 TABLET ORAL at 02:20

## 2018-12-16 RX ADMIN — ESCITALOPRAM OXALATE 20 MG: 10 TABLET ORAL at 08:30

## 2018-12-16 RX ADMIN — TRAMADOL HYDROCHLORIDE 50 MG: 50 TABLET, FILM COATED ORAL at 07:00

## 2018-12-16 RX ADMIN — MEROPENEM 2 G: 1 INJECTION, POWDER, FOR SOLUTION INTRAVENOUS at 15:12

## 2018-12-16 RX ADMIN — DILTIAZEM HYDROCHLORIDE 240 MG: 240 CAPSULE, COATED, EXTENDED RELEASE ORAL at 08:30

## 2018-12-16 RX ADMIN — TRAZODONE HYDROCHLORIDE 50 MG: 50 TABLET ORAL at 02:21

## 2018-12-16 RX ADMIN — Medication 10 ML: at 08:31

## 2018-12-16 RX ADMIN — DOCUSATE SODIUM 100 MG: 100 CAPSULE, LIQUID FILLED ORAL at 08:30

## 2018-12-16 ASSESSMENT — PAIN SCALES - GENERAL
PAINLEVEL_OUTOF10: 6
PAINLEVEL_OUTOF10: 7
PAINLEVEL_OUTOF10: 9
PAINLEVEL_OUTOF10: 0
PAINLEVEL_OUTOF10: 8

## 2018-12-16 ASSESSMENT — PAIN DESCRIPTION - LOCATION: LOCATION: LEG

## 2018-12-16 ASSESSMENT — PAIN DESCRIPTION - ORIENTATION: ORIENTATION: LEFT;RIGHT

## 2018-12-16 ASSESSMENT — PAIN DESCRIPTION - PAIN TYPE: TYPE: ACUTE PAIN

## 2018-12-16 ASSESSMENT — PAIN DESCRIPTION - DESCRIPTORS: DESCRIPTORS: ACHING;SORE

## 2018-12-16 NOTE — PROGRESS NOTES
Patient and daughter in law state they do not wish to return to [facility] upon discharge and would like to look into a new facility.  Antoinette Flaherty RN

## 2018-12-16 NOTE — ED NOTES
which was 43% of predicted FVC was 1.08 L which was 37% of predicted. FEF 1523 was 1.19 L which was 58% of predicted. Patient shows some bronchodilator response in this area. Total lung capacity was 3.58 L which was 73% of predicted. And diffusion capacity was 4.55 mL/m/mm mercury which was 19% of predicted but improved to 61% with correction for alveolar volume. .     Impression: Severe restrictive lung disease most likely secondary for patient's body habitus. Moderate decrement in total lung capacity. A mild decrement in diffusion capacity. There is some bronchodilator response in the smaller airways can which could be reflective of underlying hyperreactive airway disease.     Teresa Higgins MD    Imaging personally reviewed:  12/15  . Cardiomegaly associated with perihilar vascular congestion and   interstitial thickening with airspace opacities could represent edema   or infiltrates.  Clinical correlation is recommended.             Echo:  1/16/2018  Summary   Mild concentric left ventricular hypertrophy.   Ejection fraction is visually estimated at 55-65%.   Stage II diastolic dysfunction.   The left atrium is severely dilated.   Elevated L atrial pressure   Mild mitral regurgitation   The aortic valve appears mildly sclerotic.   Pulmonary hypertension is mild to moderate .   No evidence of pericardial effusion.       Labs:  Lab Results   Component Value Date    WBC 4.9 12/16/2018    HGB 7.4 12/16/2018    HCT 25.6 12/16/2018    MCV 83.4 12/16/2018    MCH 24.1 12/16/2018    MCHC 28.9 12/16/2018    RDW 17.4 12/16/2018     12/16/2018    MPV 9.2 12/16/2018     Lab Results   Component Value Date     12/16/2018    K 4.6 12/16/2018    CL 99 12/16/2018    CO2 41 12/16/2018    BUN 25 12/16/2018    CREATININE 1.0 12/16/2018    LABALBU 3.4 12/15/2018    LABALBU 4.5 11/02/2011    CALCIUM 8.8 12/16/2018    GFRAA >60 12/16/2018    LABGLOM 56 12/16/2018     Lab Results   Component Value Date    PROTIME 16.4 11/27/2018    INR 1.5 11/27/2018     Recent Labs      12/16/18   0700   PROBNP  1,544*     No results for input(s): TROPONINI in the last 72 hours. Recent Labs      12/16/18   0700   PROCAL  0.25*     This SmartLink has not been configured with any valid records. Assessment:  Acute hypoxic respiratory failure  Dyspnea related to acute anemia and volume overload   Chronic 02 3  L NC   Acute decompensated congestive heart failure   Asthma/copd -restrictive lung disease   Hx:  Morbid obesity   B/L LE lymphedema  Recent admission d/c on 12/05 S/p left leg tissue bx on 11/30- wound cx with ESBL E coli and pseudomonas and strep    Plan:  strep pneumoniae antigen  , legionella urine  , respiratory culture , respiratory panel , pro calcitonin . 25 , proBNP   merrem- will add  ID consult they have been following pt  Continue bi-pap nocturnal and PRN day for hypoxia/SOB   Continue diuresis per  cardiology   Wean 02 as tolerated she is on 3 L NC at home current  6 L NC   Follow chest x-ray   Add incentive spirometry   Continue zyrtec,flonase  D/c dulera will add perforomist , pulmicort, duoneb Q 4 WA     Thank you for allowing me to participate in the care of Harjit Stern. Please feel free to call with questions. This plan of care was reviewed in collaboration with Dr. Darshana Henderson     Electronically signed by RAUL Tolbert on 12/16/2018 at 12:16 PM    I personally saw, examined, and cared for the patient. Labs, medications, radiographs reviewed. I agree with history exam and plans detailed in NP note. Diuresis per cardiology   Doubt she has pneumonia, more likely dyspnea is related to pulmonary edema and diastolic chf   nippv for respiratory support during the day and with sleep she should be wearing bipap due to OHS/SAM  Defer abx to ID for cellulitis    Darshana Henderson M.D.    Pulmonary/Critical Care Medicine

## 2018-12-16 NOTE — CONSULTS
Date    CRP 4.9 (H) 12/16/2018    CRP 10.9 (H) 12/02/2018    CRP 1.3 (H) 08/18/2015      No results found for: CRP  Lab Results   Component Value Date    SEDRATE 108 (H) 12/02/2018    SEDRATE 26 (H) 08/18/2015    SEDRATE 36 (H) 02/16/2015     Lab Results   Component Value Date    ALT 14 12/15/2018    AST 17 12/15/2018    ALKPHOS 74 12/15/2018    BILITOT 0.3 12/15/2018     Lab Results   Component Value Date     12/16/2018    K 4.6 12/16/2018    CL 99 12/16/2018    CO2 41 12/16/2018    BUN 25 12/16/2018    CREATININE 1.0 12/16/2018    GFRAA >60 12/16/2018    LABGLOM 56 12/16/2018    GLUCOSE 114 12/16/2018    GLUCOSE 181 12/16/2011    PROT 6.5 12/15/2018    LABALBU 3.4 12/15/2018    LABALBU 4.5 11/02/2011    CALCIUM 8.8 12/16/2018    BILITOT 0.3 12/15/2018    ALKPHOS 74 12/15/2018    AST 17 12/15/2018    ALT 14 12/15/2018       Lab Results   Component Value Date    PROTIME 16.4 11/27/2018    INR 1.5 11/27/2018       Lab Results   Component Value Date    TSH 1.760 05/12/2017       Lab Results   Component Value Date    NITRITE negative 07/26/2018    COLORU Yellow 11/27/2018    PHUR 6.0 11/27/2018    CLARITYU Clear 11/27/2018    SPECGRAV 1.020 11/27/2018    LEUKOCYTESUR Negative 11/27/2018    UROBILINOGEN 0.2 11/27/2018    BILIRUBINUR Negative 11/27/2018    BILIRUBINUR negative 07/26/2018    BLOODU Negative 11/27/2018    GLUCOSEU 100 11/27/2018     Radiology:  Noted    Microbiology:  Pending  No results for input(s): BC in the last 72 hours. No results for input(s): ORG in the last 72 hours. No results for input(s): Carly Marus in the last 72 hours. No results for input(s): STREPNEUMAGU in the last 72 hours. No results for input(s): LP1UAG in the last 72 hours. No results for input(s): ASO in the last 72 hours. No results for input(s): CULTRESP in the last 72 hours.     Assessment:  · Diastolic congestive heart failure  · Bilateral lower extremity lymphedema  · Left lower extremity cellulitis with wound infection. Cultures previously grew ESBL + E. coli, Pseudomonas and Streptococcus agalactiae. Patient was being treated with Meropenem    Plan:    · Continue Meropenem for now. Dr. Ammon Sargent to take over tomorrow  · Will follow with you    Thank you for having us see this patient in consultation. I will be discussing this case with the treating physicians.     Reanna Loev  1:36 PM  12/16/2018

## 2018-12-16 NOTE — H&P
[place] [id number] [facility]     History and Physical      CHIEF COMPLAINT: Worsening edema, weight gain, shortness of breath and significant drop in hemoglobin level. History of Present Illness: The pt is a 70 y.o. female with a history of DM2, COPD, AF, HTN, HLD, blood clots, anemia, etc, who was sent back to the ER from the nursing home yesterday evening after labs showed drop in hemoglobin down to 6.3. In addition in the last few days since she was discharged from the hospital to the nursing home, patient has had significant weight gain due to buildup of edema and associated worsening shortness of breath. In the ER when patient's head end was lowered in the gurney so that a Hansen catheter could be inserted, she became very hypoxic right away. Patient was switched from her usual supplemental oxygen nasal cannula 4 LPM to BiPAP with supplemental oxygen in order to keep her SaO2 a satisfactory range. Overnight there has not been much improvement in SaO2 and need for BiPAP. Informant(s) for H&P: Patient.     REVIEW OF SYSTEMS:  Constitutional: negative for chills, fevers and sweats  Eyes: negative  Ears, nose, mouth, throat, and face: negative for ear drainage, earaches, epistaxis, hoarseness, nasal congestion, sore throat, tinnitus and vertigo  Respiratory: positive for dyspnea on exertion, shortness of breath and wheezing, negative for cough, hemoptysis, sputum and stridor  Cardiovascular: positive for weight gain of about 20 pounds the last few days due to recurrent edema, with orthopnea, shortness of breath at rest and dyspnea on attempted exertion, negative for exertional chest pressure/discomfort, irregular heart beat, near-syncope, palpitations, paroxysmal nocturnal dyspnea and syncope  Gastrointestinal: negative for abdominal pain, constipation, diarrhea, dysphagia, melena, nausea, reflux symptoms, vomiting and blood in stool  Genitourinary:negative for dysuria, hematuria and MG tablet, TAKE 1/2 TABLET BY MOUTH EVERY MORNING  nystatin (MYCOSTATIN) 551869 UNIT/GM cream, Apply topically 2 times daily. escitalopram (LEXAPRO) 20 MG tablet, TAKE 1 TABLET BY MOUTH DAILY (Patient taking differently: Take 20 mg by mouth every morning )  ferrous sulfate (NAOMY-ALEC) 325 (65 Fe) MG tablet, Take 1 tablet by mouth daily (with breakfast)  OXYGEN, Inhale 4 L into the lungs continuous   lansoprazole (PREVACID) 30 MG delayed release capsule, TAKE 1 CAPSULE BY MOUTH DAILY (Patient taking differently: TAKE 1 CAPSULE BY MOUTH EVERY MORNING)  fenofibrate (TRICOR) 145 MG tablet, TAKE 1 TABLET BY MOUTH DAILY (Patient taking differently: TAKE 1 TABLET BY MOUTH EVERY MORNING)  DULERA 200-5 MCG/ACT inhaler, INHALE 2 PUFFS INTO LUNGS TWICE A DAY RINSE MOUTH AFTER USE  ipratropium-albuterol (DUONEB) 0.5-2.5 (3) MG/3ML SOLN nebulizer solution, Inhale 3 mLs into the lungs every 4 hours. (Patient taking differently: Inhale 1 vial into the lungs every 4 hours as needed for Shortness of Breath )  ALPRAZolam (XANAX) 0.25 MG tablet, Take 1 tablet by mouth nightly as needed for Anxiety for up to 30 days. .  meropenem (MERREM) infusion, Infuse 2,000 mg intravenously every 12 hours for 14 days Compound per protocol.  gabapentin (NEURONTIN) 800 MG tablet, TAKE 1/2 TABLET BY MOUTH 3 TIMES DAILY FOR 30 DAYS. . (Patient taking differently: TAKE 1/2 TABLET BY MOUTH 3 TIMES DAILY)  docusate sodium (COLACE) 100 MG capsule, TAKE 1 CAPSULE BY MOUTH 3 TIMES DAILY (Patient taking differently: Take 100 mg by mouth 3 times daily as needed for Constipation )  fluticasone (FLONASE) 50 MCG/ACT nasal spray, USE 2 SPRAYS BY NASAL ROUTE DAILY (Patient taking differently: USE 2 SPRAYS BY NASAL ROUTE DAILY AS NEEDED)  Elastic Bandages & Supports MISC, 20-30 mmHg bilateral compression stockings Size to fit Dx:  Edema Knee high    Allergies:    Statins    Social History:    reports that she quit smoking about 14 years ago.  Her smoking use included HCO3 Latest Ref Range: 22.0 - 26.0 mmol/L 39.9 (H)   Base Excess Latest Ref Range: -3.0 - 3.0 mmol/L 13.1 (H)   O2 Sat Latest Ref Range: 92.0 - 98.5 % 93.4       Radiology:   Xr Chest Portable  Result Date: 12/15/2018  Patient MRN:  [id number] : 1955 Age: 70 years Gender: Female Order Date:  12/15/2018 7:15 PM EXAM: XR CHEST PORTABLE NUMBER OF IMAGES:  1 view INDICATION: Hypertension, hyperlipidemia, diabetes mellitus, Fever COMPARISON: Chest x-ray 2018 FINDINGS:  Right PICC line catheter is present with tip projecting in the subclavicular region. Cardiac silhouette appears enlarged. Perihilar vascular congestion and interstitial thickening and airspace opacities. No significant pleural effusion or pneumothorax. Mild bibasilar atelectasis. Upper abdomen is unremarkable   1. Cardiomegaly associated with perihilar vascular congestion and interstitial thickening with airspace opacities could represent edema or infiltrates. Clinical correlation is recommended. EKG 12/15/2018 1950 hrs: Normal sinus rhythm, 91/m. Normal ECG. When compared with ECG of 2018 13:51, no significant change was found. REVIEW OF PRIOR TESTS:  2018 Echocardiogram:   Summary   Mild concentric left ventricular hypertrophy.   Ejection fraction is visually estimated at 55-65%.   Stage II diastolic dysfunction.   The left atrium is severely dilated.   Elevated L atrial pressure   Mild mitral regurgitation   The aortic valve appears mildly sclerotic.   Pulmonary hypertension is mild to moderate .   No evidence of pericardial effusion.       ASSESSMENT:      Principal Problem:    Acute on chronic diastolic heart failure (HCC)  Active Problems:    Acute on chronic anemia    COPD (chronic obstructive pulmonary disease) (HCC)    Acute on chronic respiratory failure with hypoxia (HCC)    Hypertension    PAF (paroxysmal atrial fibrillation) ([facility] [address] - currently in SR    Pulmonary hypertension    Chronic anticoagulation    Type 2 diabetes mellitus with diabetic neuropathy, with long-term current use of insulin (HCC)    Hyperlipidemia    Left leg cellulitis with multiple organisms including ESBL+ E-coli  Resolved Problems:    * No resolved hospital problems. *      PLAN:    · Diurese - additional dose of Lasix 20 MG IV now. F/u I/O, VS, lytes, renal fx. · Cardiology consult re further mgmt. · Continue BiPAP for now. · Pulm consult. · F/u Hg and consider need for additional PRBC transfusions. · Continue meds for other issues as prior, including NOAC at this time - watch for worsened bleeding. · DM2: Continue Lantus and Humalog regimen, carb controlled diet as prior. F/u BG closely as pt is on high doses of insulins. · HLD: Continue fenofibrate as prior. · LE cellulitis: Continue meropenem the patient was on at discharge last week. · DVT prophylaxis - continue NOAC. · GI bleed prophylaxis - PPI as prior.       Please note that over 50 minutes was spent in evaluating the patient, review of records and results, discussion with staff, etc.    Electronically signed by Lázaro Valles [facility] at [place] [id number]

## 2018-12-16 NOTE — ED NOTES
Pt tolerating bipap without difficulty, resps easy and unlabored at this time     Pérez Saucedo  12/15/18 1505

## 2018-12-16 NOTE — CONSULTS
Inpatient Cardiology Consultation      Reason for Consult: HFpEF    Consulting Physician: Dr. Veronica Sosa    Requesting Physician:  Dr. Marck Ibarra    Date of Consultation: 12/16/2018    HISTORY OF PRESENT ILLNESS:     Please note that HPI is limited as the patient is lethargic and somewhat confused with BiPap in place. Ms. Norberto Garcia is a 70year old female with is known to [facility] and follows with Dr. Felicity Laguerre. She has a PMHx of HFpEF, morbid obesity, PAF, chronic anticoagulation with Eliquis, HTN, hx of TIA with noted PFO per JIMENA, T2DM, COPD with ltot, and hx of GI bleed. She presented to [facility] ED on 12/15/2018 for abnormal labs. She presented from [facility] for acute anemia (Hgb 6.5). Upon arrival to the ED she complained of shortness of breath, she was hypoxic with a noted elevated CO2 requiring Bipap placement. She was lethargic, somewhat obtunded and confused. Of note she was recently admitted from 11/27 - 12/5 for sepsis related to cellulitis and wound left leg that underwent wound debridement and is currently on long term IV antibiotics. CXR demonstrated cardiomegaly associated with perihilar vascular congestion and interstitial thickening with airspace opacities could represent edema or infiltrates. Pro-bnp elevated 1544. She was started on IV diuretics and admitted to the hospital for further evaluation and treatment. Please note: past medical records were reviewed per electronic medical record (EMR) - see detailed reports under Past Medical/ Surgical History. Past Medical History:    1. HTN  2. HLD: on statin therapy  3. Chronic Anemia  4. PAF: Not on coumadin d/t Hx of GIB (2004) and chronic anemia. [id number] score at least 5: (DM, TIA, Female, HTN)  5. GERD  6. 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    7. HFpEF:               A. Admitted 01/15/18 with progressive edyspnea troponin normal [id number] hest xray showed b/l effusion, BNP= 814 and respiratory acidosis on ABGx2. She was given DubNebs x3, Solumedrol and placed on BiPAP. CBC showed an anemic Hemoglobin = 8.2. IV diuresed ~2.5 L.    B. TTE (1/16/2018, Dr. Christi Vences) [facility] concentric left ventricular hypertrophy. Ejection fraction is visually estimated at 55-65%. Stage II diastolic dysfunction. The left atrium is severely dilated. Elevated L atrial pressure. Mild mitral regurgitation. The aortic valve appears mildly sclerotic. Pulmonary hypertension is mild to moderate. No evidence of pericardial effusion. 9. Questionable TIA (5/2015) with questionable PFO:              A. TTE with bubble study: 6/2/2015:  Mild asymmetric septal hypertrophy, Normal left ventricle size and systolic function, Stage II diastolic dysfunction, Patent foramen ovale with right-to-left shunt with valsalva only was visualized, Trace mitral regurgitation, RVSP is 37 mmHg. B. Per Dr. Sd Asher: (5/22/2015-office visit note)-\"I personally viewed the echo images myself now.  The images were poor & the atrial septum was not well seen. Christine Clipper is not a definite ASD.  A bubble study was performed & was poor quality but the few bubbles that were there did not cross the atrial septum.  The septum is probably aneurysmal & & thus may have a PFO but again the visualization was too poor.  She states that Neuro was still not anum that it was a TIA. \"-Dr. Sd Asher recommended a repeat JIMENA or cardiac MRI-patient reported \"I didn't want the testing\". 9. Stress test 8/2008: No stress induced ischemia, EF 54%  10. Probable SAM: scheduled for sleep study 4/13/2018 in outpatient. 11. COPD with chronic home O2 (4L NC): follows with Dr. Paddy Locke. 12. Chronic Lymphedema   13. Super morbid Obese: BMI 50.3. 14. History of anal fissure s/p surgery at [facility] (further details unknown)  15. History of occipital neuralgia   16. Gait instability: uses a walker. 17. Remote tobacco abuse   18.  Anxiety/Depression     Past Surgical History:    Past Surgical History: Yes Keke Abdi MD   terbinafine (LAMISIL) 1 % cream Apply topically 2 times daily. 12/4/18  Yes Leda Santana MD   diltiazem (CARDIZEM CD) 240 MG extended release capsule TAKE 1 CAPSULE BY MOUTH 2 TIMES DAILY  Patient taking differently: Take 240 mg by mouth 2 times daily  11/23/18  Yes Jennifer Alvarez DO   albuterol sulfate HFA (VENTOLIN HFA) 108 (90 Base) MCG/ACT inhaler INHALE 2 PUFFS INTO LUNGS EVERY 4 HOURS AS NEEDED FOR WHEEZING OR SHORTNESS OF BREATH  Patient taking differently: Inhale 2 puffs into the lungs every 4 hours as needed for Wheezing or Shortness of Breath  10/17/18  Yes Jennifer Alvarez DO   fexofenadine (ALLEGRA) 180 MG tablet TAKE 1 TABLET BY MOUTH EVERY DAY  Patient taking differently: TAKE 1 TABLET BY MOUTH EVERY MORNING 10/15/18  Yes Radha Wise,    traZODone (DESYREL) 50 MG tablet Take 1 tablet by mouth nightly 9/14/18  Yes Jennifer Alvarez DO   torsemide (DEMADEX) 20 MG tablet TAKE 1 TABLET BY MOUTH EVERY DAY  Patient taking differently: TAKE 1 TABLET BY MOUTH EVERY MORNING 8/29/18  Yes Jennifer Alvarez DO   apixaban (ELIQUIS) 5 MG TABS tablet Take 1 tablet by mouth 2 times daily 8/1/18  Yes Jennifer Alvarez DO   metoprolol (LOPRESSOR) 100 MG tablet TAKE 1 TABLET BY MOUTH 2 TIMES DAILY  Patient taking differently: Take 100 mg by mouth 2 times daily  6/15/18  Yes Jennifer Alvarez DO   spironolactone (ALDACTONE) 50 MG tablet TAKE 1/2 TABLET BY MOUTH EVERY MORNING 5/24/18  Yes Historical Provider, MD   nystatin (MYCOSTATIN) 295571 UNIT/GM cream Apply topically 2 times daily.  6/12/18  Yes Jennifer Alvarez DO   escitalopram (LEXAPRO) 20 MG tablet TAKE 1 TABLET BY MOUTH DAILY  Patient taking differently: Take 20 mg by mouth every morning  6/6/18  Yes Jennifer Alvarez DO   ferrous sulfate (NAOMY-ALEC) 325 (65 Fe) MG tablet Take 1 tablet by mouth daily (with breakfast) 4/26/18  Yes Jaquelin Painter, APRN - CNP   OXYGEN Inhale 4 L into the lungs continuous    Yes Historical Provider, MD stated age. Lethargic with bipap in place. HEENT:   Head- Normocephalic, atraumatic   Eyes- Conjunctivae pink, anicteric  Throat- Oral mucosa pink and moist  Neck-  No stridor, trachea midline, + jugular venous distention. No carotid bruit. CHEST: Chest symmetrical and non-tender to palpation. No accessory muscle use or intercostal retractions  RESPIRATORY: Lung sounds - diminished   CARDIOVASCULAR:     Heart Inspection- shows no noted pulsations  Heart Palpation- no heaves or thrills; PMI is non-displaced   Heart Ausculation- Regular rate and rhythm, no murmur. No s3, s4 or rub   PV: 3+ lower extremity edema. No varicosities. Pedal pulses palpable, no clubbing or cyanosis   ABDOMEN: Soft, non-tender to light palpation. Bowel sounds present. No palpable masses no organomegaly; no abdominal bruit  MS: Good muscle strength and tone. No atrophy or abnormal movements. : Deferred  SKIN: Warm and dry no statis dermatitis or ulcers   NEURO / PSYCH: Oriented to person, place and time. Speech clear and appropriate. Follows all commands. Pleasant affect     DATA:    ECG / Tele strips: please see HPI   Diagnostic:    CXR (12/15/2018)  1. Cardiomegaly associated with perihilar vascular congestion and  interstitial thickening with airspace opacities could represent edema  or infiltrates.  Clinical correlation is recommended.         Intake/Output Summary (Last 24 hours) at 12/16/18 1044  Last data filed at 12/16/18 0414   Gross per 24 hour   Intake               10 ml   Output             3025 ml   Net            -3015 ml       Recent Labs      12/15/18   1819  12/16/18   0205  12/16/18   0700   WBC  4.6   --   4.9   HGB  6.5*  7.2*  7.4*   HCT  23.3*  25.2*  25.6*   PLT  330   --   302     BMP: Recent Labs      12/15/18   1935  12/16/18   0700   NA  135  145   K  4.7  4.6   CO2  37*  41*   BUN  30*  25*   CREATININE  1.2*  1.0   LABGLOM  45  56   CALCIUM  8.8  8.8     Mag:   Recent Labs      12/16/18   0700   MG  2.2 HgA1c:   Lab Results   Component Value Date    LABA1C 8.0 (H) 11/11/2018     proBNP:   Recent Labs      12/15/18   1935  12/16/18   0700   PROBNP  1,421*  1,544*     FASTING LIPID PANEL:  Lab Results   Component Value Date    CHOL 177 05/12/2017    HDL 34 05/16/2018    LDLCALC 98 05/16/2018    TRIG 285 05/12/2017     LIVER PROFILE:  Recent Labs      12/15/18   1935   AST  17   ALT  14   LABALBU  3.4*       Electronically signed by RAUL Hernandez CNP on 12/16/2018 at 10:44 AM     The above documentation has been prepared under my direction and personally reviewed by me in its entirety. I confirm that the note above accurately reflects all work, treatment, procedures, and medical decision making performed by me. The patient's history was independently obtained. The patient was independently examined. Electrocardiogram, prior and present cardiovascular assessment, and laboratory studies were reviewed. The patient is a 75-year-old white female known to [facility] cardiology with primary cardiovascular care provided by Renetta Bhatt. She has a known history of chronic diastolic heart failure and paroxysmal atrial fibrillation in the face of morbid obesity and likely undiagnosed obstructive sleep apnea. She additionally has a history of hypertension, diabetes, hyperlipidemia and a remote history of a transient cerebral ischemic episode with a previously documented patent foramen ovale and no intervention based on her needs of chronic anticoagulation. She has undergone most recent objective assessment with that of an echocardiogram in January, 2018 demonstrating evidence of a normal size left ventricular chamber with mild concentric left hypertrophy and normal left ventricular systolic function with stage II diastolic dysfunction and severe left atrial enlargement.  No significant valvular heart disease is present with moderate pulmonary hypertension right ventricular systolic pressures of approximately 60

## 2018-12-17 LAB
ANION GAP SERPL CALCULATED.3IONS-SCNC: 7 MMOL/L (ref 7–16)
ANISOCYTOSIS: ABNORMAL
BASOPHILS ABSOLUTE: 0.03 E9/L (ref 0–0.2)
BASOPHILS RELATIVE PERCENT: 0.6 % (ref 0–2)
BUN BLDV-MCNC: 23 MG/DL (ref 8–23)
CALCIUM SERPL-MCNC: 9.4 MG/DL (ref 8.6–10.2)
CHLORIDE BLD-SCNC: 96 MMOL/L (ref 98–107)
CO2: 40 MMOL/L (ref 22–29)
CREAT SERPL-MCNC: 1 MG/DL (ref 0.5–1)
EOSINOPHILS ABSOLUTE: 0.13 E9/L (ref 0.05–0.5)
EOSINOPHILS RELATIVE PERCENT: 2.4 % (ref 0–6)
FILM ARRAY ADENOVIRUS: NORMAL
FILM ARRAY BORDETELLA PERTUSSIS: NORMAL
FILM ARRAY CHLAMYDOPHILIA PNEUMONIAE: NORMAL
FILM ARRAY CORONAVIRUS 229E: NORMAL
FILM ARRAY CORONAVIRUS HKU1: NORMAL
FILM ARRAY CORONAVIRUS NL63: NORMAL
FILM ARRAY CORONAVIRUS OC43: NORMAL
FILM ARRAY INFLUENZA A VIRUS 09H1: NORMAL
FILM ARRAY INFLUENZA A VIRUS H1: NORMAL
FILM ARRAY INFLUENZA A VIRUS H3: NORMAL
FILM ARRAY INFLUENZA A VIRUS: NORMAL
FILM ARRAY INFLUENZA B: NORMAL
FILM ARRAY METAPNEUMOVIRUS: NORMAL
FILM ARRAY MYCOPLASMA PNEUMONIAE: NORMAL
FILM ARRAY PARAINFLUENZA VIRUS 1: NORMAL
FILM ARRAY PARAINFLUENZA VIRUS 2: NORMAL
FILM ARRAY PARAINFLUENZA VIRUS 3: NORMAL
FILM ARRAY PARAINFLUENZA VIRUS 4: NORMAL
FILM ARRAY RESPIRATORY SYNCITIAL VIRUS: NORMAL
FILM ARRAY RHINOVIRUS/ENTEROVIRUS: NORMAL
GFR AFRICAN AMERICAN: >60
GFR NON-AFRICAN AMERICAN: 56 ML/MIN/1.73
GLUCOSE BLD-MCNC: 116 MG/DL (ref 74–99)
HCT VFR BLD CALC: 29.4 % (ref 34–48)
HEMOGLOBIN: 8.4 G/DL (ref 11.5–15.5)
HYPOCHROMIA: ABNORMAL
IMMATURE GRANULOCYTES #: 0.01 E9/L
IMMATURE GRANULOCYTES %: 0.2 % (ref 0–5)
L. PNEUMOPHILA SEROGP 1 UR AG: NORMAL
LYMPHOCYTES ABSOLUTE: 1.01 E9/L (ref 1.5–4)
LYMPHOCYTES RELATIVE PERCENT: 18.9 % (ref 20–42)
MAGNESIUM: 2 MG/DL (ref 1.6–2.6)
MCH RBC QN AUTO: 23.5 PG (ref 26–35)
MCHC RBC AUTO-ENTMCNC: 28.6 % (ref 32–34.5)
MCV RBC AUTO: 82.1 FL (ref 80–99.9)
METER GLUCOSE: 108 MG/DL (ref 74–99)
METER GLUCOSE: 135 MG/DL (ref 74–99)
METER GLUCOSE: 147 MG/DL (ref 74–99)
METER GLUCOSE: 191 MG/DL (ref 74–99)
MONOCYTES ABSOLUTE: 0.5 E9/L (ref 0.1–0.95)
MONOCYTES RELATIVE PERCENT: 9.3 % (ref 2–12)
NEUTROPHILS ABSOLUTE: 3.67 E9/L (ref 1.8–7.3)
NEUTROPHILS RELATIVE PERCENT: 68.6 % (ref 43–80)
OVALOCYTES: ABNORMAL
PDW BLD-RTO: 17.5 FL (ref 11.5–15)
PLATELET # BLD: 351 E9/L (ref 130–450)
PMV BLD AUTO: 9.4 FL (ref 7–12)
POIKILOCYTES: ABNORMAL
POLYCHROMASIA: ABNORMAL
POTASSIUM REFLEX MAGNESIUM: 4 MMOL/L (ref 3.5–5)
POTASSIUM SERPL-SCNC: 4 MMOL/L (ref 3.5–5)
PRO-BNP: 1713 PG/ML (ref 0–125)
RBC # BLD: 3.58 E12/L (ref 3.5–5.5)
SODIUM BLD-SCNC: 143 MMOL/L (ref 132–146)
STREP PNEUMONIAE ANTIGEN, URINE: NORMAL
TARGET CELLS: ABNORMAL
WBC # BLD: 5.4 E9/L (ref 4.5–11.5)

## 2018-12-17 PROCEDURE — 2580000003 HC RX 258: Performed by: INTERNAL MEDICINE

## 2018-12-17 PROCEDURE — 6360000002 HC RX W HCPCS: Performed by: INTERNAL MEDICINE

## 2018-12-17 PROCEDURE — 94640 AIRWAY INHALATION TREATMENT: CPT

## 2018-12-17 PROCEDURE — 83735 ASSAY OF MAGNESIUM: CPT

## 2018-12-17 PROCEDURE — APPSS30 APP SPLIT SHARED TIME 16-30 MINUTES: Performed by: NURSE PRACTITIONER

## 2018-12-17 PROCEDURE — 94660 CPAP INITIATION&MGMT: CPT

## 2018-12-17 PROCEDURE — 85025 COMPLETE CBC W/AUTO DIFF WBC: CPT

## 2018-12-17 PROCEDURE — 2700000000 HC OXYGEN THERAPY PER DAY

## 2018-12-17 PROCEDURE — 97165 OT EVAL LOW COMPLEX 30 MIN: CPT

## 2018-12-17 PROCEDURE — 82962 GLUCOSE BLOOD TEST: CPT

## 2018-12-17 PROCEDURE — 80048 BASIC METABOLIC PNL TOTAL CA: CPT

## 2018-12-17 PROCEDURE — 6370000000 HC RX 637 (ALT 250 FOR IP): Performed by: INTERNAL MEDICINE

## 2018-12-17 PROCEDURE — 83880 ASSAY OF NATRIURETIC PEPTIDE: CPT

## 2018-12-17 PROCEDURE — 1200000000 HC SEMI PRIVATE

## 2018-12-17 PROCEDURE — 36415 COLL VENOUS BLD VENIPUNCTURE: CPT

## 2018-12-17 PROCEDURE — 6370000000 HC RX 637 (ALT 250 FOR IP): Performed by: CLINICAL NURSE SPECIALIST

## 2018-12-17 PROCEDURE — 6360000002 HC RX W HCPCS: Performed by: CLINICAL NURSE SPECIALIST

## 2018-12-17 PROCEDURE — 99233 SBSQ HOSP IP/OBS HIGH 50: CPT | Performed by: INTERNAL MEDICINE

## 2018-12-17 PROCEDURE — 97161 PT EVAL LOW COMPLEX 20 MIN: CPT

## 2018-12-17 PROCEDURE — 99232 SBSQ HOSP IP/OBS MODERATE 35: CPT | Performed by: INTERNAL MEDICINE

## 2018-12-17 RX ORDER — DEXTROSE MONOHYDRATE 25 G/50ML
12.5 INJECTION, SOLUTION INTRAVENOUS PRN
Status: DISCONTINUED | OUTPATIENT
Start: 2018-12-17 | End: 2018-12-21 | Stop reason: HOSPADM

## 2018-12-17 RX ORDER — PETROLATUM 420 MG/G
OINTMENT TOPICAL DAILY
Status: DISCONTINUED | OUTPATIENT
Start: 2018-12-17 | End: 2018-12-21 | Stop reason: HOSPADM

## 2018-12-17 RX ORDER — DEXTROSE MONOHYDRATE 50 MG/ML
100 INJECTION, SOLUTION INTRAVENOUS PRN
Status: DISCONTINUED | OUTPATIENT
Start: 2018-12-17 | End: 2018-12-21 | Stop reason: HOSPADM

## 2018-12-17 RX ORDER — CEPHALEXIN 500 MG/1
500 CAPSULE ORAL EVERY 8 HOURS SCHEDULED
Status: DISCONTINUED | OUTPATIENT
Start: 2018-12-17 | End: 2018-12-21 | Stop reason: HOSPADM

## 2018-12-17 RX ORDER — NICOTINE POLACRILEX 4 MG
15 LOZENGE BUCCAL PRN
Status: DISCONTINUED | OUTPATIENT
Start: 2018-12-17 | End: 2018-12-21 | Stop reason: HOSPADM

## 2018-12-17 RX ORDER — TORSEMIDE 20 MG/1
20 TABLET ORAL 2 TIMES DAILY
Status: DISCONTINUED | OUTPATIENT
Start: 2018-12-17 | End: 2018-12-18

## 2018-12-17 RX ORDER — DOXYCYCLINE HYCLATE 100 MG/1
100 CAPSULE ORAL EVERY 12 HOURS SCHEDULED
Status: DISCONTINUED | OUTPATIENT
Start: 2018-12-17 | End: 2018-12-21 | Stop reason: HOSPADM

## 2018-12-17 RX ADMIN — TRAMADOL HYDROCHLORIDE 50 MG: 50 TABLET, FILM COATED ORAL at 16:40

## 2018-12-17 RX ADMIN — MAGNESIUM HYDROXIDE 30 ML: 400 SUSPENSION ORAL at 03:30

## 2018-12-17 RX ADMIN — CEPHALEXIN 500 MG: 500 CAPSULE ORAL at 16:12

## 2018-12-17 RX ADMIN — IPRATROPIUM BROMIDE AND ALBUTEROL SULFATE 1 AMPULE: .5; 3 SOLUTION RESPIRATORY (INHALATION) at 13:50

## 2018-12-17 RX ADMIN — PETROLATUM: 420 OINTMENT TOPICAL at 13:53

## 2018-12-17 RX ADMIN — Medication 10 ML: at 08:36

## 2018-12-17 RX ADMIN — TERBINAFINE HYDROCHLORIDE: 1 CREAM TOPICAL at 23:02

## 2018-12-17 RX ADMIN — TRAZODONE HYDROCHLORIDE 50 MG: 50 TABLET ORAL at 21:26

## 2018-12-17 RX ADMIN — DOCUSATE SODIUM 100 MG: 100 CAPSULE, LIQUID FILLED ORAL at 08:35

## 2018-12-17 RX ADMIN — DILTIAZEM HYDROCHLORIDE 240 MG: 240 CAPSULE, COATED, EXTENDED RELEASE ORAL at 21:26

## 2018-12-17 RX ADMIN — TRAMADOL HYDROCHLORIDE 50 MG: 50 TABLET, FILM COATED ORAL at 04:21

## 2018-12-17 RX ADMIN — FORMOTEROL FUMARATE DIHYDRATE 20 MCG: 20 SOLUTION RESPIRATORY (INHALATION) at 09:46

## 2018-12-17 RX ADMIN — ENOXAPARIN SODIUM 30 MG: 30 INJECTION SUBCUTANEOUS at 08:35

## 2018-12-17 RX ADMIN — TRAMADOL HYDROCHLORIDE 50 MG: 50 TABLET, FILM COATED ORAL at 22:44

## 2018-12-17 RX ADMIN — Medication 10 ML: at 21:27

## 2018-12-17 RX ADMIN — NYSTATIN: 100000 CREAM TOPICAL at 13:53

## 2018-12-17 RX ADMIN — CEPHALEXIN 500 MG: 500 CAPSULE ORAL at 23:02

## 2018-12-17 RX ADMIN — BUDESONIDE 500 MCG: 0.5 INHALANT RESPIRATORY (INHALATION) at 09:46

## 2018-12-17 RX ADMIN — ALPRAZOLAM 0.25 MG: 0.25 TABLET ORAL at 22:44

## 2018-12-17 RX ADMIN — TORSEMIDE 20 MG: 20 TABLET ORAL at 21:26

## 2018-12-17 RX ADMIN — DILTIAZEM HYDROCHLORIDE 240 MG: 240 CAPSULE, COATED, EXTENDED RELEASE ORAL at 08:35

## 2018-12-17 RX ADMIN — FERROUS SULFATE TAB 325 MG (65 MG ELEMENTAL FE) 325 MG: 325 (65 FE) TAB at 08:35

## 2018-12-17 RX ADMIN — TERBINAFINE HYDROCHLORIDE: 1 CREAM TOPICAL at 08:38

## 2018-12-17 RX ADMIN — FORMOTEROL FUMARATE DIHYDRATE 20 MCG: 20 SOLUTION RESPIRATORY (INHALATION) at 19:21

## 2018-12-17 RX ADMIN — FENOFIBRATE 160 MG: 160 TABLET ORAL at 08:35

## 2018-12-17 RX ADMIN — METOPROLOL TARTRATE 100 MG: 50 TABLET ORAL at 21:26

## 2018-12-17 RX ADMIN — MEROPENEM 2 G: 1 INJECTION, POWDER, FOR SOLUTION INTRAVENOUS at 04:17

## 2018-12-17 RX ADMIN — SPIRONOLACTONE 25 MG: 25 TABLET ORAL at 08:35

## 2018-12-17 RX ADMIN — IPRATROPIUM BROMIDE AND ALBUTEROL SULFATE 1 AMPULE: .5; 3 SOLUTION RESPIRATORY (INHALATION) at 17:04

## 2018-12-17 RX ADMIN — TORSEMIDE 20 MG: 20 TABLET ORAL at 08:35

## 2018-12-17 RX ADMIN — BUDESONIDE 500 MCG: 0.5 INHALANT RESPIRATORY (INHALATION) at 19:21

## 2018-12-17 RX ADMIN — TRAMADOL HYDROCHLORIDE 50 MG: 50 TABLET, FILM COATED ORAL at 10:38

## 2018-12-17 RX ADMIN — METOPROLOL TARTRATE 100 MG: 50 TABLET ORAL at 08:35

## 2018-12-17 RX ADMIN — DOXYCYCLINE HYCLATE 100 MG: 100 CAPSULE ORAL at 21:26

## 2018-12-17 RX ADMIN — CETIRIZINE HYDROCHLORIDE 10 MG: 10 TABLET, FILM COATED ORAL at 08:35

## 2018-12-17 RX ADMIN — PANTOPRAZOLE SODIUM 40 MG: 40 TABLET, DELAYED RELEASE ORAL at 07:37

## 2018-12-17 RX ADMIN — ESCITALOPRAM OXALATE 20 MG: 10 TABLET ORAL at 08:35

## 2018-12-17 RX ADMIN — NYSTATIN: 100000 CREAM TOPICAL at 23:03

## 2018-12-17 ASSESSMENT — PAIN DESCRIPTION - FREQUENCY
FREQUENCY: CONTINUOUS
FREQUENCY: CONTINUOUS

## 2018-12-17 ASSESSMENT — PAIN SCALES - GENERAL
PAINLEVEL_OUTOF10: 6
PAINLEVEL_OUTOF10: 0
PAINLEVEL_OUTOF10: 0
PAINLEVEL_OUTOF10: 7
PAINLEVEL_OUTOF10: 8
PAINLEVEL_OUTOF10: 5
PAINLEVEL_OUTOF10: 7
PAINLEVEL_OUTOF10: 0
PAINLEVEL_OUTOF10: 2
PAINLEVEL_OUTOF10: 5

## 2018-12-17 ASSESSMENT — PAIN DESCRIPTION - LOCATION
LOCATION: LEG
LOCATION: FOOT
LOCATION: FOOT

## 2018-12-17 ASSESSMENT — PAIN DESCRIPTION - ORIENTATION
ORIENTATION: RIGHT;LEFT

## 2018-12-17 ASSESSMENT — PAIN DESCRIPTION - DESCRIPTORS
DESCRIPTORS: ACHING;DISCOMFORT
DESCRIPTORS: ACHING
DESCRIPTORS: ACHING;DISCOMFORT

## 2018-12-17 ASSESSMENT — PAIN DESCRIPTION - PAIN TYPE: TYPE: CHRONIC PAIN

## 2018-12-17 NOTE — PROGRESS NOTES
and Identification of Deficits- B LE edema, B LE pain, obesity, O2 dependence, steps into home  Clinical Decision Making- Min         Assessment of current deficits    Functional mobility [x]                 ROM []   Strength [x]                     Cognition []  ADLs [x]                          IADLs [] Safety Awareness [x]      Endurance [x]  Fine Motor Coordination []        Balance [x]          Vision/perception []       Sensation []         Gross Motor Coordination []        Treatment frequency: 2-5x/wk     Plan of Care:   ADL retraining [x]                                    Equipment needs [x]         Neuromuscular re-education [] Energy Conservation Techniques [x]  Functional Transfer training [x]   Patient and/or Family Education [x]  Functional Mobility training [x]                Environmental Modifications []  Cognitive re-training []               Compensatory techniques for ADLs [x]  Splinting Needs []                             Therapeutic Activities [x]  Positioning/joint protection []             Therapeutic Strengthening  [x]      Other: []      Rehab Potential: Good for stated goals     Patient / Family Goal: To return home     Short term goals  Time Frame: 1-2 weeks     1. Increase Toileting to Min A  2. Increase Functional Transfers to Mod I  3. Increase Functional Mobility to Mod I during all self care tasks  4. Increase standing tolerance to 3-4 min with fair balance during self care tasks  5. Increase UE Dressing/Bathing and Grooming to Independent        Patient and/or family understands diagnosis, prognosis and plan of care: Patient educated on OT POC/Established Goals. Patient verbalized good understanding.     Treatment Provided:  Patient educated on adapted techniques for completion of ADL, safe functional transfers and mobility.   Patient required cues for follow through with proper hand/foot placement, pacing, safety and technique in functional transfers, functional mobility, and LB dressing in preparation for maximum independence in self care tasks.      Comments: Upon arrival pt supine in bed.   At end of session supine in bed with all devices within reach, all lines and tubes intact.        Time In: 1040  Time Out: Aakash OTR/L [id number]

## 2018-12-17 NOTE — PLAN OF CARE
Problem: Falls - Risk of:  Goal: Will remain free from falls  Will remain free from falls   Outcome: Ongoing    Goal: Absence of physical injury  Absence of physical injury   Outcome: Ongoing      Problem: Skin Integrity:  Goal: Will show no infection signs and symptoms  Will show no infection signs and symptoms   Outcome: Ongoing    Goal: Absence of new skin breakdown  Absence of new skin breakdown   Outcome: Ongoing      Problem: Risk for Impaired Skin Integrity  Goal: Tissue integrity - skin and mucous membranes  Structural intactness and normal physiological function of skin and  mucous membranes.    Outcome: Ongoing

## 2018-12-17 NOTE — PROGRESS NOTES
CC- B/L LE cellulitis    Subjective: The patient is awake and alert. Tolerating medications. Reports no side effects. Legs improving. Afebrile. 10 ROS otherwise negative unless otherwise specified above. Objective:    Vitals:    12/17/18 0834   BP: (!) 127/57   Pulse: 74   Resp: 20   Temp: 97.8 °F (36.6 °C)   SpO2: 96%       General Appearance:    Awake, alert , no acute distress. HEENT:    Normocephalic,PERRL,neck supple, no JVD, mucosa moist, no thrush   Lungs:     occ crackles   Heart:    Regular rate and rhythm, no murmur   Abdomen:     Soft, non-tender, not distended  bowel sounds present,     Extremities:   B/L LE lymphedema, Left lower leg wrappe. Redness resolved. Chronic skin changes with nor warmth. Pulses:   2+ and symmetric all extremities   Skin:   warm/dry, no rash     CBC+dif:  WBC-5.4   Radiology:  Noted     Microbiology:  OR TISSUE cx- ESBL E COLI, pseudomonas group B strep      Assessment:     · LLE cellulitis.  S/p left leg tissue bx on 11/30- wound cx with ESBL E coli and pseudomonas and strep  · B/L LE lymphedema and chronic venous stasis ulcer with infection improving  · onychomycosis     Plan:    · DC  iv merrem   · Switch to oral doxycycline and keflex for 10 days  · Terbinafine  · Will follow with you     Electronically signed by Anamaria Pichardo MD on 12/17/2018 at 1:49 PM

## 2018-12-17 NOTE — PROGRESS NOTES
INPATIENT CARDIOLOGY FOLLOW-UP    Name: Renzo Matias    Age: 70 y.o. Date of Admission: 12/15/2018  5:46 PM    Date of Service: 12/17/2018    Chief Complaint: Follow-up for hypoxic respiratory failure, acute superimposed upon chronic diastolic heart failure, cellulitis, paroxysmal atrial fibrillation, morbid obesity, obstructive sleep apnea    Interim History: The patient appears symptomatically improved with significant fluid mobilization and transfusion with improvement of her hemoglobin levels noted and no documentation of active bleeding. Sinus rhythm has been maintained is otherwise hemodynamically stable. Review of Systems: The remainder of a complete multisystem review including consitutional, central nervous, respiratory, circulatory, gastrointestinal, genitourinary, endocrinologic, hematologic, musculoskeletal and psychiatric are negative.     Problem List:  Patient Active Problem List   Diagnosis    Type 2 diabetes mellitus with diabetic neuropathy, with long-term current use of insulin ([address]    Depression    Hypertension    Hyperlipidemia    Osteoarthritis    PAF (paroxysmal atrial fibrillation) ([address] - currently in SR    Pulmonary hypertension    Chronic sinusitis    Chronic pain    Steatohepatitis    Baker's cyst of knee    Diabetic neuropathy (HCC)    Iron deficiency    LVH (left ventricular hypertrophy)    Chronic anal fissure    Positive hepatitis C antibody test    PFO (patent foramen ovale)    Chronic diastolic heart failure (HCC)    Physical deconditioning    Lymphedema of both lower extremities    Chronic anemia    Dyspnea on exertion    Chronic deep vein thrombosis (DVT) of distal vein of right lower extremity (HCC)    GI bleed    Palpitations    Occult blood in stools    Anxiety and depression    Chronic anticoagulation    COPD (chronic obstructive pulmonary disease) ([address]    Acute on chronic respiratory failure with hypoxia ([address]    Blood loss injection 10 mL  10 mL Intravenous 2 times per day Devonne Collet, MD   10 mL at 12/16/18 2050    sodium chloride flush 0.9 % injection 10 mL  10 mL Intravenous PRN Devonne Collet, MD   10 mL at 12/16/18 0710    acetaminophen (TYLENOL) tablet 650 mg  650 mg Oral Q4H PRN Devonne Collet, MD             Physical Exam:  /61   Pulse 68   Temp 98.3 °F (36.8 °C) (Axillary)   Resp 20   Ht 5' 3\" (1.6 m)   Wt (!) 316 lb 6.4 oz (143.5 kg)   LMP  (LMP Unknown)   SpO2 98%   BMI 56.05 kg/m²   Weight change: -26 lb 1.6 oz (-11.839 kg)  Wt Readings from Last 3 Encounters:   12/17/18 (!) 316 lb 6.4 oz (143.5 kg)   12/04/18 (!) 310 lb (140.6 kg)   11/13/18 298 lb 4.8 oz (135.3 kg)     The patient is awake, alert and in no discomfort or distress. No gross musculoskeletal deformity is present. No significant skin or nail changes are present. Gross examination of head, eyes, nose and throat are negative. Jugular venous pressure is normal and no carotid bruits are present. Normal respiratory effort is noted with no accessory muscle usage present. Lung fields are clear to ascultation. Cardiac examination is notable for a regular rate and rhythm with no palpable thrill. No gallop rhythm or cardiac murmur are identified. A benign abdominal examination is present with The exception of obesity and no masses or organomegaly. Intact pulses are present throughout all extremities and chronic lymphedema and venous stasis/cellulitis is present. No focal neurologic deficits are present. Intake/Output:    Intake/Output Summary (Last 24 hours) at 12/17/18 0631  Last data filed at 12/17/18 1549   Gross per 24 hour   Intake              480 ml   Output             7325 ml   Net            -6845 ml     I/O this shift:   In: 480 [P.O.:480]  Out: 825 [Urine:825]    Laboratory Tests:  Lab Results   Component Value Date    CREATININE 1.0 12/17/2018    BUN 23 12/17/2018     12/17/2018    K 4.0 12/17/2018    K 4.0 12/17/2018    CL 96 (L) 12/17/2018    CO2 40 (H) 12/17/2018     No results for input(s): CKTOTAL, CKMB in the last 72 hours. Invalid input(s): TROPONONI  No results found for: BNP  Lab Results   Component Value Date    WBC 5.4 12/17/2018    RBC 3.58 12/17/2018    HGB 8.4 12/17/2018    HCT 29.4 12/17/2018    MCV 82.1 12/17/2018    MCH 23.5 12/17/2018    MCHC 28.6 12/17/2018    RDW 17.5 12/17/2018     12/17/2018    MPV 9.4 12/17/2018     Recent Labs      12/15/18   1935   ALKPHOS  74   ALT  14   AST  17   PROT  6.5   BILITOT  0.3   LABALBU  3.4*     Lab Results   Component Value Date    MG 2.0 12/17/2018     Lab Results   Component Value Date    PROTIME 16.4 11/27/2018    INR 1.5 11/27/2018     Lab Results   Component Value Date    TSH 1.760 05/12/2017     No components found for: CHLPL  Lab Results   Component Value Date    TRIG 285 (H) 05/12/2017    TRIG 336 (H) 08/15/2016    TRIG 599 (H) 05/12/2016     Lab Results   Component Value Date    HDL 34 05/16/2018    HDL 37 05/12/2017    HDL 40 08/15/2016     Lab Results   Component Value Date    LDLCALC 98 05/16/2018    LDLCALC 83 05/12/2017    LDLCALC 101 (H) 08/15/2016       Cardiac Tests:  Telemetry findings reviewed: sinus rhythm with occasional supraventricular and ventricular ectopy, no new tachy/bradyarrhythmias overnight      ASSESSMENT / PLAN:  On a clinical basis, the patient appears symptomatically improved following fluid mobilization and transfusion with a significant improvement of her volume status. Further assessment of the origin of her anemia will be deferred to primary care with plans of conversion of her diuretics to oral administration with need of careful monitoring of her volume status and renal function electrolytes. Based on maintenance of sinus rhythm and her anemia, a temporary withholding of her oral anticoagulation with maintenance of lumbar weight heparin for thromboembolic protection pending further evaluation.  Additional

## 2018-12-17 NOTE — PROGRESS NOTES
I provided the pt with the Heart Failure book, the HF Zones. We reviewed the HF zones, signs and symptoms to report on day 1 of onset, medication compliance, daily weights, low sodium diet (label reading)   I advised patient you can reduce the risk for heart failure exacerbations by modifying/controlling the risk factors they have. We discussed self-managed care which includes the following:  to take medications as prescribed- to call the doctor with any side effects do not just stop taking the medication, follow a cardiac heart healthy / low sodium diet, do daily weights, exercise regularly- per doctor recommendation and not to smoke. I stressed the importance of informing the cardiologist the first day of onset of signs and symptoms in the \"Yellow Zone\" of the HF Zones. Verbalizes understanding    Echocardiogram / EF: 1/16/2018   Mild concentric left ventricular hypertrophy.   Ejection fraction is visually estimated at 55-65%.   Stage II diastolic dysfunction.   The left atrium is severely dilated.   Elevated L atrial pressure   Mild mitral regurgitation   The aortic valve appears mildly sclerotic.   Pulmonary hypertension is mild to moderate .   No evidence of pericardial effusion. BNP: 1,713    History of: HTN, HLD, COPD, CHF, afib, asthma, inappropriate sinus tachycardia, Hx of blood clots,    Medications: Hydralazine, cardizem, lopressor, aldactone, demadex  Pt currently has no questions. Code Status: FUll    The patient is ordered:  Diet (sodium restriction): carb control, no caffeine   Sodium/fluid restriction daily ordered (fluid restriction recommended if patient is hyponatremic and/or diuretic is initiated or increased):  [] Yes     [x] No  FR: not ordered  Daily Weights: Wt Readings from Last 3 Encounters:   12/17/18 (!) 316 lb 6.4 oz (143.5 kg)   12/04/18 (!) 310 lb (140.6 kg)   11/13/18 298 lb 4.8 oz (135.3 kg)     I/O: I/O last 3 completed shifts:   In: 480 [P.O.:480]  Out: 5694

## 2018-12-18 LAB
ANION GAP SERPL CALCULATED.3IONS-SCNC: 7 MMOL/L (ref 7–16)
BASOPHILS ABSOLUTE: 0.04 E9/L (ref 0–0.2)
BASOPHILS RELATIVE PERCENT: 0.8 % (ref 0–2)
BUN BLDV-MCNC: 22 MG/DL (ref 8–23)
CALCIUM SERPL-MCNC: 9.7 MG/DL (ref 8.6–10.2)
CHLORIDE BLD-SCNC: 94 MMOL/L (ref 98–107)
CO2: 40 MMOL/L (ref 22–29)
CREAT SERPL-MCNC: 1 MG/DL (ref 0.5–1)
EOSINOPHILS ABSOLUTE: 0.19 E9/L (ref 0.05–0.5)
EOSINOPHILS RELATIVE PERCENT: 3.6 % (ref 0–6)
GFR AFRICAN AMERICAN: >60
GFR NON-AFRICAN AMERICAN: 56 ML/MIN/1.73
GLUCOSE BLD-MCNC: 130 MG/DL (ref 74–99)
HCT VFR BLD CALC: 27.2 % (ref 34–48)
HEMOGLOBIN: 8 G/DL (ref 11.5–15.5)
IMMATURE GRANULOCYTES #: 0.03 E9/L
IMMATURE GRANULOCYTES %: 0.6 % (ref 0–5)
LYMPHOCYTES ABSOLUTE: 1.11 E9/L (ref 1.5–4)
LYMPHOCYTES RELATIVE PERCENT: 21 % (ref 20–42)
MCH RBC QN AUTO: 24 PG (ref 26–35)
MCHC RBC AUTO-ENTMCNC: 29.4 % (ref 32–34.5)
MCV RBC AUTO: 81.4 FL (ref 80–99.9)
METER GLUCOSE: 123 MG/DL (ref 74–99)
METER GLUCOSE: 158 MG/DL (ref 74–99)
METER GLUCOSE: 241 MG/DL (ref 74–99)
METER GLUCOSE: 246 MG/DL (ref 74–99)
METER GLUCOSE: 246 MG/DL (ref 74–99)
MONOCYTES ABSOLUTE: 0.48 E9/L (ref 0.1–0.95)
MONOCYTES RELATIVE PERCENT: 9.1 % (ref 2–12)
NEUTROPHILS ABSOLUTE: 3.43 E9/L (ref 1.8–7.3)
NEUTROPHILS RELATIVE PERCENT: 64.9 % (ref 43–80)
PDW BLD-RTO: 17.7 FL (ref 11.5–15)
PLATELET # BLD: 332 E9/L (ref 130–450)
PMV BLD AUTO: 8.6 FL (ref 7–12)
POTASSIUM REFLEX MAGNESIUM: 4.5 MMOL/L (ref 3.5–5)
POTASSIUM SERPL-SCNC: 4.5 MMOL/L (ref 3.5–5)
RBC # BLD: 3.34 E12/L (ref 3.5–5.5)
SODIUM BLD-SCNC: 141 MMOL/L (ref 132–146)
WBC # BLD: 5.3 E9/L (ref 4.5–11.5)

## 2018-12-18 PROCEDURE — 85025 COMPLETE CBC W/AUTO DIFF WBC: CPT

## 2018-12-18 PROCEDURE — APPSS30 APP SPLIT SHARED TIME 16-30 MINUTES: Performed by: NURSE PRACTITIONER

## 2018-12-18 PROCEDURE — 99232 SBSQ HOSP IP/OBS MODERATE 35: CPT | Performed by: HOSPITALIST

## 2018-12-18 PROCEDURE — 94660 CPAP INITIATION&MGMT: CPT

## 2018-12-18 PROCEDURE — 2580000003 HC RX 258: Performed by: INTERNAL MEDICINE

## 2018-12-18 PROCEDURE — 6370000000 HC RX 637 (ALT 250 FOR IP): Performed by: INTERNAL MEDICINE

## 2018-12-18 PROCEDURE — 1200000000 HC SEMI PRIVATE

## 2018-12-18 PROCEDURE — 36415 COLL VENOUS BLD VENIPUNCTURE: CPT

## 2018-12-18 PROCEDURE — 99232 SBSQ HOSP IP/OBS MODERATE 35: CPT | Performed by: INTERNAL MEDICINE

## 2018-12-18 PROCEDURE — 82962 GLUCOSE BLOOD TEST: CPT

## 2018-12-18 PROCEDURE — 80048 BASIC METABOLIC PNL TOTAL CA: CPT

## 2018-12-18 PROCEDURE — 2700000000 HC OXYGEN THERAPY PER DAY

## 2018-12-18 PROCEDURE — 6360000002 HC RX W HCPCS: Performed by: INTERNAL MEDICINE

## 2018-12-18 PROCEDURE — 6360000002 HC RX W HCPCS: Performed by: CLINICAL NURSE SPECIALIST

## 2018-12-18 PROCEDURE — 6370000000 HC RX 637 (ALT 250 FOR IP): Performed by: CLINICAL NURSE SPECIALIST

## 2018-12-18 PROCEDURE — 94640 AIRWAY INHALATION TREATMENT: CPT

## 2018-12-18 RX ORDER — TORSEMIDE 20 MG/1
20 TABLET ORAL DAILY
Status: DISCONTINUED | OUTPATIENT
Start: 2018-12-18 | End: 2018-12-21 | Stop reason: HOSPADM

## 2018-12-18 RX ADMIN — CEPHALEXIN 500 MG: 500 CAPSULE ORAL at 06:09

## 2018-12-18 RX ADMIN — IPRATROPIUM BROMIDE AND ALBUTEROL SULFATE 1 AMPULE: .5; 3 SOLUTION RESPIRATORY (INHALATION) at 08:47

## 2018-12-18 RX ADMIN — CEPHALEXIN 500 MG: 500 CAPSULE ORAL at 13:31

## 2018-12-18 RX ADMIN — NYSTATIN: 100000 CREAM TOPICAL at 21:02

## 2018-12-18 RX ADMIN — TERBINAFINE HYDROCHLORIDE: 1 CREAM TOPICAL at 21:02

## 2018-12-18 RX ADMIN — TRAZODONE HYDROCHLORIDE 50 MG: 50 TABLET ORAL at 21:02

## 2018-12-18 RX ADMIN — PETROLATUM: 420 OINTMENT TOPICAL at 11:00

## 2018-12-18 RX ADMIN — FERROUS SULFATE TAB 325 MG (65 MG ELEMENTAL FE) 325 MG: 325 (65 FE) TAB at 09:02

## 2018-12-18 RX ADMIN — TRAMADOL HYDROCHLORIDE 50 MG: 50 TABLET, FILM COATED ORAL at 10:58

## 2018-12-18 RX ADMIN — Medication 10 ML: at 09:02

## 2018-12-18 RX ADMIN — IPRATROPIUM BROMIDE AND ALBUTEROL SULFATE 1 AMPULE: .5; 3 SOLUTION RESPIRATORY (INHALATION) at 17:13

## 2018-12-18 RX ADMIN — TRAMADOL HYDROCHLORIDE 50 MG: 50 TABLET, FILM COATED ORAL at 04:58

## 2018-12-18 RX ADMIN — FLUTICASONE PROPIONATE 2 SPRAY: 50 SPRAY, METERED NASAL at 08:58

## 2018-12-18 RX ADMIN — INSULIN LISPRO 45 UNITS: 100 INJECTION, SOLUTION INTRAVENOUS; SUBCUTANEOUS at 12:50

## 2018-12-18 RX ADMIN — BUDESONIDE 500 MCG: 0.5 INHALANT RESPIRATORY (INHALATION) at 08:47

## 2018-12-18 RX ADMIN — ALPRAZOLAM 0.25 MG: 0.25 TABLET ORAL at 23:55

## 2018-12-18 RX ADMIN — DILTIAZEM HYDROCHLORIDE 240 MG: 240 CAPSULE, COATED, EXTENDED RELEASE ORAL at 09:02

## 2018-12-18 RX ADMIN — CEPHALEXIN 500 MG: 500 CAPSULE ORAL at 22:08

## 2018-12-18 RX ADMIN — DOXYCYCLINE HYCLATE 100 MG: 100 CAPSULE ORAL at 21:01

## 2018-12-18 RX ADMIN — PANTOPRAZOLE SODIUM 40 MG: 40 TABLET, DELAYED RELEASE ORAL at 06:09

## 2018-12-18 RX ADMIN — FORMOTEROL FUMARATE DIHYDRATE 20 MCG: 20 SOLUTION RESPIRATORY (INHALATION) at 19:49

## 2018-12-18 RX ADMIN — TRAMADOL HYDROCHLORIDE 50 MG: 50 TABLET, FILM COATED ORAL at 17:00

## 2018-12-18 RX ADMIN — Medication 10 ML: at 21:03

## 2018-12-18 RX ADMIN — FORMOTEROL FUMARATE DIHYDRATE 20 MCG: 20 SOLUTION RESPIRATORY (INHALATION) at 08:47

## 2018-12-18 RX ADMIN — DOXYCYCLINE HYCLATE 100 MG: 100 CAPSULE ORAL at 09:03

## 2018-12-18 RX ADMIN — TORSEMIDE 20 MG: 20 TABLET ORAL at 09:02

## 2018-12-18 RX ADMIN — NYSTATIN: 100000 CREAM TOPICAL at 08:58

## 2018-12-18 RX ADMIN — ENOXAPARIN SODIUM 30 MG: 30 INJECTION SUBCUTANEOUS at 09:02

## 2018-12-18 RX ADMIN — CETIRIZINE HYDROCHLORIDE 10 MG: 10 TABLET, FILM COATED ORAL at 09:03

## 2018-12-18 RX ADMIN — FENOFIBRATE 160 MG: 160 TABLET ORAL at 09:02

## 2018-12-18 RX ADMIN — ACETAMINOPHEN 650 MG: 325 TABLET ORAL at 22:08

## 2018-12-18 RX ADMIN — METOPROLOL TARTRATE 100 MG: 50 TABLET ORAL at 09:02

## 2018-12-18 RX ADMIN — DILTIAZEM HYDROCHLORIDE 240 MG: 240 CAPSULE, COATED, EXTENDED RELEASE ORAL at 21:02

## 2018-12-18 RX ADMIN — BUDESONIDE 500 MCG: 0.5 INHALANT RESPIRATORY (INHALATION) at 19:49

## 2018-12-18 RX ADMIN — METOPROLOL TARTRATE 100 MG: 50 TABLET ORAL at 21:02

## 2018-12-18 RX ADMIN — TRAMADOL HYDROCHLORIDE 50 MG: 50 TABLET, FILM COATED ORAL at 23:00

## 2018-12-18 RX ADMIN — SPIRONOLACTONE 25 MG: 25 TABLET ORAL at 09:03

## 2018-12-18 RX ADMIN — ESCITALOPRAM OXALATE 20 MG: 10 TABLET ORAL at 09:02

## 2018-12-18 RX ADMIN — ACETAMINOPHEN 650 MG: 325 TABLET ORAL at 12:38

## 2018-12-18 RX ADMIN — TERBINAFINE HYDROCHLORIDE: 1 CREAM TOPICAL at 08:58

## 2018-12-18 RX ADMIN — IPRATROPIUM BROMIDE AND ALBUTEROL SULFATE 1 AMPULE: .5; 3 SOLUTION RESPIRATORY (INHALATION) at 12:06

## 2018-12-18 ASSESSMENT — PAIN DESCRIPTION - ORIENTATION
ORIENTATION: LOWER
ORIENTATION: RIGHT
ORIENTATION: RIGHT
ORIENTATION: RIGHT;LEFT
ORIENTATION: LEFT;RIGHT
ORIENTATION: RIGHT;LEFT
ORIENTATION: RIGHT

## 2018-12-18 ASSESSMENT — PAIN SCALES - GENERAL
PAINLEVEL_OUTOF10: 0
PAINLEVEL_OUTOF10: 5
PAINLEVEL_OUTOF10: 9
PAINLEVEL_OUTOF10: 8
PAINLEVEL_OUTOF10: 3
PAINLEVEL_OUTOF10: 5
PAINLEVEL_OUTOF10: 6
PAINLEVEL_OUTOF10: 3
PAINLEVEL_OUTOF10: 8
PAINLEVEL_OUTOF10: 2
PAINLEVEL_OUTOF10: 3
PAINLEVEL_OUTOF10: 7

## 2018-12-18 ASSESSMENT — PAIN DESCRIPTION - FREQUENCY
FREQUENCY: CONTINUOUS

## 2018-12-18 ASSESSMENT — PAIN DESCRIPTION - PAIN TYPE
TYPE: ACUTE PAIN
TYPE: CHRONIC PAIN
TYPE: CHRONIC PAIN

## 2018-12-18 ASSESSMENT — PAIN DESCRIPTION - DESCRIPTORS
DESCRIPTORS: ACHING
DESCRIPTORS: ACHING;DISCOMFORT
DESCRIPTORS: ACHING;DISCOMFORT;SORE
DESCRIPTORS: ACHING;DISCOMFORT
DESCRIPTORS: ACHING;DISCOMFORT;SORE
DESCRIPTORS: ACHING;DISCOMFORT;SORE

## 2018-12-18 ASSESSMENT — PAIN DESCRIPTION - LOCATION
LOCATION: FOOT
LOCATION: LEG
LOCATION: FOOT
LOCATION: FOOT
LOCATION: LEG
LOCATION: BACK
LOCATION: LEG

## 2018-12-18 ASSESSMENT — PAIN DESCRIPTION - ONSET
ONSET: ON-GOING
ONSET: ON-GOING

## 2018-12-18 NOTE — PROGRESS NOTES
Type and Reason for Visit: Initial, Positive Nutrition Screen, Consult, RD Nutrition Re-Screen (False Positive)    Nutrition Screen:   · Have you recently lost weight without trying? - 0 to 1 pound (0 points)   · Have you been eating poorly because of a decreased appetite? - No (0 points)   · Malnutrition Screening Tool Score - 0    Dietitian Assessment of Nutrition Re-Screen: Pt marked as a positive nutrition screen for wounds and consult for low kiera. Pt currently w/ no noted significant poor kolby/intake, no significant un-planned wt loss, no open non-healing wounds, no noted difficulty chewing/swallowing, no home EN/PN use and no other nutr. issues noted at this time. CHF Diet Edu not appropriate at this time d/t SNF/[facility] pt. Will follow per policy. Please consult if needed.           Electronically signed by Andre Dolan RD, LD on 12/18/18 at 3:53 PM    Contact Number: ext [phone]

## 2018-12-18 NOTE — DISCHARGE INSTR - COC
Continuity of Care Form    Patient Name: Yuli Oconnor   :  1955  MRN:  [id number]    Admit date:  12/15/2018  Discharge date:  18    Code Status Order: Full Code   Advance Directives:   Advance Care Flowsheet Documentation     Date/Time Healthcare Directive Type of Healthcare Directive Copy in [address] Agent's Name Healthcare Agent's Phone Number    12/15/18 2900  No, patient does not have an advance directive for healthcare treatment -- -- -- -- --          Admitting Physician:  Mago Gaytan DO  PCP: Drew Molina DO    Discharging Nurse: Celeste Barrios 23 Unit/Room#: 5352/2655-A  Discharging Unit Phone Number: [phone]    Emergency Contact:   Extended Emergency Contact Information  Primary Emergency Contact: Nicolas Betancur  Address: [place] [address] Phone: [phone]  Mobile Phone: [phone]  Relation: Child  Secondary Emergency Contact: Thee Betancur  Address: [address] [place] [address] Phone: [phone]  Mobile Phone: [phone]  Relation: Child    Past Surgical History:  Past Surgical History:   Procedure Laterality Date    ANOSCOPY  10/25/2006    injection of anal sphincter with Botox, Franklyn Ortiz/Timmy, [facility]    ANOSCOPY  2007    injection of anal sphincter with Botox, Dr. Dre Villegas, [address] [place] [address] ANOSCOPY  2007    injection of anal sphincter with Botox, Franklyn Bell [facility]    ANUS SURGERY  2006    Lateral sphincterotomy for chronic anal fissure, Dr. Yoan Lacy, [facility]  2012    anal exam and injection of Botox 100 units into anal sphincter, Laila Bell, [facility]   Tracey Mackenzie 15    COLONOSCOPY  2004    cecal polyp, bx (no pathologic changes), Dr. Bahman Tobias, [address] [place] [address] COLONOSCOPY  2006    snare polypectomy terminal ileum Colostomy/Ileostomy/Ileal Conduit: No       Date of Last BM: 12/20/18    Intake/Output Summary (Last 24 hours) at 12/18/18 1453  Last data filed at 12/18/18 0838   Gross per 24 hour   Intake                0 ml   Output             2825 ml   Net            -2825 ml     I/O last 3 completed shifts: In: 320 [P.O.:320]  Out: [address] [Urine:3975]    Safety Concerns:     none    Impairments/Disabilities:      Ambulation    Nutrition Therapy:  Current Nutrition Therapy:   Carb Control    Routes of Feeding: Independent  Liquids: Thin Liquids  Daily Fluid Restriction: no  Last Modified Barium Swallow with Video (Video Swallowing Test): not done    Treatments at the Time of Hospital Discharge:    Respiratory Treatments:  yes  Oxygen Therapy:  is not on home oxygen therapy. Ventilator:    - No ventilator support    Rehab Therapies: Physical Therapy  Weight Bearing Status/Restrictions: No weight bearing restirctions  Other Medical Equipment (for information only, NOT a DME order): LEOPOLDO Hutchinson  Other Treatments:     Patient's personal belongings (please select all that are sent with patient):  None    RN SIGNATURE:  Electronically signed by Mode Renee RN on 12/20/18 at [facility]    CASE MANAGEMENT/SOCIAL WORK SECTION    Inpatient Status Date: ***    Readmission Risk Assessment Score:  Readmission Risk              Risk of Unplanned Readmission:        34           Discharging to Facility/ Agency   · Name:  [facility]   · Address:  · Phone:  · Fax:    Dialysis Facility (if applicable)   · Name:  · Address:  · Dialysis Schedule:  · Phone:  · Fax:    / signature: Electronically signed by JADEN Henriquez on 12/18/2018 at 2:54 PM      PHYSICIAN SECTION    Prognosis: Good    Condition at Discharge: Stable    Rehab Potential (if transferring to Rehab):  Fair    Recommended Labs or Other Treatments After Discharge: ***    Physician Certification: I certify the above information and transfer of

## 2018-12-18 NOTE — PROGRESS NOTES
INPATIENT CARDIOLOGY FOLLOW-UP    Name: Berneice Ganser    Age: 70 y.o. Date of Admission: 12/15/2018  5:46 PM    Date of Service: 12/18/2018    Chief Complaint: Follow-up for chronic hypoxic respiratory failure, chronic diastolic heart failure, cellulitis, paroxysmal atrial fibrillation, morbid obesity, obstructive sleep apnea    Interim History: The patient presently remains clinically stable from a cardiovascular standpoint with no identified arrhythmias. She remains anemic with stable hemoglobin levels. Most recent pulmonary assessment has been reviewed. Review of Systems: The remainder of a complete multisystem review including consitutional, central nervous, respiratory, circulatory, gastrointestinal, genitourinary, endocrinologic, hematologic, musculoskeletal and psychiatric are negative.     Problem List:  Patient Active Problem List   Diagnosis    Type 2 diabetes mellitus with diabetic neuropathy, with long-term current use of insulin ([address]    Depression    Hypertension    Hyperlipidemia    Osteoarthritis    PAF (paroxysmal atrial fibrillation) ([address] - currently in SR    Pulmonary hypertension    Chronic sinusitis    Chronic pain    Steatohepatitis    Baker's cyst of knee    Diabetic neuropathy (HCC)    Iron deficiency    LVH (left ventricular hypertrophy)    Chronic anal fissure    Positive hepatitis C antibody test    PFO (patent foramen ovale)    Chronic diastolic heart failure (HCC)    Physical deconditioning    Lymphedema of both lower extremities    Chronic anemia    Dyspnea on exertion    Chronic deep vein thrombosis (DVT) of distal vein of right lower extremity (HCC)    GI bleed    Palpitations    Occult blood in stools    Anxiety and depression    Chronic anticoagulation    COPD (chronic obstructive pulmonary disease) ([address]    Acute on chronic respiratory failure with hypoxia ([address]    Blood loss anemia    Sepsis ([address]    Cellulitis of left lower extremity with breakfast Dada Queen MD   325 mg at 12/17/18 7247    cetirizine (ZYRTEC) tablet 10 mg  10 mg Oral Daily Dada Queen MD   10 mg at 12/17/18 0835    fluticasone (FLONASE) 50 MCG/ACT nasal spray 2 spray  2 spray Nasal Daily Dada Queen MD        insulin glargine (LANTUS) injection vial 100 Units  100 Units Subcutaneous Nightly Dada Queen MD   40 Units at 12/16/18 2043    insulin lispro (HUMALOG) injection vial 90 Units  90 Units Subcutaneous TID AC Dada Queen MD        ipratropium-albuterol (DUONEB) nebulizer solution 3 mL  1 vial Inhalation Q4H PRN Dada Queen MD        pantoprazole (PROTONIX) tablet 40 mg  40 mg Oral QAM AC Dada Queen MD   40 mg at 12/18/18 3609    metoprolol tartrate (LOPRESSOR) tablet 100 mg  100 mg Oral BID Dada Queen MD   100 mg at 12/17/18 2126    nystatin (MYCOSTATIN) cream   Topical BID Dada Queen MD        spironolactone (ALDACTONE) tablet 25 mg  25 mg Oral Daily Dada Queen MD   25 mg at 12/17/18 0075    terbinafine (LAMISIL) 1 % cream   Topical BID Dada Queen MD        traZODone (DESYREL) tablet 50 mg  50 mg Oral Nightly Dada Queen MD   50 mg at 12/17/18 2126    sodium chloride flush 0.9 % injection 10 mL  10 mL Intravenous 2 times per day Dada Queen MD   10 mL at 12/17/18 2127    sodium chloride flush 0.9 % injection 10 mL  10 mL Intravenous PRN Dada Queen MD   10 mL at 12/16/18 1158    magnesium hydroxide (MILK OF MAGNESIA) 400 MG/5ML suspension 30 mL  30 mL Oral Daily PRN Dada Queen MD   30 mL at 12/17/18 0330    ondansetron (ZOFRAN) injection 4 mg  4 mg Intravenous Q6H PRN Dada Queen MD        traMADol Percilla Rasheeda) tablet 50 mg  50 mg Oral Q6H PRN Dada Queen MD   50 mg at 12/18/18 0458    insulin lispro (HUMALOG) injection vial 45 Units  45 Units Subcutaneous Once Dada Queen MD        formoterol

## 2018-12-18 NOTE — PROGRESS NOTES
kg/m²     General Appearance: alert and oriented to person, place and time and in no acute distress  Skin: warm and dry  Head: normocephalic and atraumatic  Eyes: pupils equal, round, and reactive to light, extraocular eye movements intact, conjunctivae normal  Neck: neck supple and non tender without mass   Pulmonary/Chest: clear to auscultation bilaterally- no wheezes, rales or rhonchi, normal air movement, no respiratory distress  Cardiovascular: normal rate, normal S1 and S2 and no carotid bruits  Abdomen: soft, non-tender, non-distended, normal bowel sounds, no masses or organomegaly  Extremities: no cyanosis, no clubbing and 2+ BLE edema  Neurologic: no cranial nerve deficit and speech normal  : gerber with yellow urine        Recent Labs      12/16/18   0700  12/17/18   0257  12/18/18   0435   NA  145  143  141   K  4.6  4.0  4.0  4.5  4.5   CL  99  96*  94*   CO2  41*  40*  40*   BUN  25*  23  22   CREATININE  1.0  1.0  1.0   GLUCOSE  114*  116*  130*   CALCIUM  8.8  9.4  9.7       Recent Labs      12/16/18   0700  12/17/18   0257  12/18/18   0435   WBC  4.9  5.4  5.3   RBC  3.07*  3.58  3.34*   HGB  7.4*  8.4*  8.0*   HCT  25.6*  29.4*  27.2*   MCV  83.4  82.1  81.4   MCH  24.1*  23.5*  24.0*   MCHC  28.9*  28.6*  29.4*   RDW  17.4*  17.5*  17.7*   PLT  302  351  332   MPV  9.2  9.4  8.6       Radiology:     Assessment:    Principal Problem:    Acute diastolic congestive heart failure (HCC)  Active Problems:    Type 2 diabetes mellitus with diabetic neuropathy, with long-term current use of insulin (HCC)    Hypertension    Hyperlipidemia    PAF (paroxysmal atrial fibrillation) ([facility]) - currently in SR    Pulmonary hypertension    COPD (chronic obstructive pulmonary disease) ([facility])    Acute on chronic respiratory failure with hypoxia ([facility])    Cellulitis of left lower extremity    Acute on chronic anemia    Morbid obesity (HCC)    Acute hypoxemic respiratory failure ([facility])  Resolved Problems:    * No resolved

## 2018-12-19 LAB
ANION GAP SERPL CALCULATED.3IONS-SCNC: 7 MMOL/L (ref 7–16)
BLOOD BANK DISPENSE STATUS: NORMAL
BLOOD BANK DISPENSE STATUS: NORMAL
BLOOD BANK PRODUCT CODE: NORMAL
BLOOD BANK PRODUCT CODE: NORMAL
BPU ID: NORMAL
BPU ID: NORMAL
BUN BLDV-MCNC: 27 MG/DL (ref 8–23)
CALCIUM SERPL-MCNC: 9.2 MG/DL (ref 8.6–10.2)
CHLORIDE BLD-SCNC: 94 MMOL/L (ref 98–107)
CO2: 40 MMOL/L (ref 22–29)
CREAT SERPL-MCNC: 1.1 MG/DL (ref 0.5–1)
DESCRIPTION BLOOD BANK: NORMAL
DESCRIPTION BLOOD BANK: NORMAL
GFR AFRICAN AMERICAN: >60
GFR NON-AFRICAN AMERICAN: 50 ML/MIN/1.73
GLUCOSE BLD-MCNC: 196 MG/DL (ref 74–99)
METER GLUCOSE: 143 MG/DL (ref 74–99)
METER GLUCOSE: 155 MG/DL (ref 74–99)
METER GLUCOSE: 184 MG/DL (ref 74–99)
METER GLUCOSE: 86 MG/DL (ref 74–99)
POTASSIUM SERPL-SCNC: 4.2 MMOL/L (ref 3.5–5)
SODIUM BLD-SCNC: 141 MMOL/L (ref 132–146)

## 2018-12-19 PROCEDURE — 6370000000 HC RX 637 (ALT 250 FOR IP): Performed by: INTERNAL MEDICINE

## 2018-12-19 PROCEDURE — APPSS30 APP SPLIT SHARED TIME 16-30 MINUTES: Performed by: NURSE PRACTITIONER

## 2018-12-19 PROCEDURE — 36415 COLL VENOUS BLD VENIPUNCTURE: CPT

## 2018-12-19 PROCEDURE — 97110 THERAPEUTIC EXERCISES: CPT

## 2018-12-19 PROCEDURE — 2580000003 HC RX 258: Performed by: INTERNAL MEDICINE

## 2018-12-19 PROCEDURE — 6370000000 HC RX 637 (ALT 250 FOR IP): Performed by: CLINICAL NURSE SPECIALIST

## 2018-12-19 PROCEDURE — 94640 AIRWAY INHALATION TREATMENT: CPT

## 2018-12-19 PROCEDURE — 1200000000 HC SEMI PRIVATE

## 2018-12-19 PROCEDURE — 99232 SBSQ HOSP IP/OBS MODERATE 35: CPT | Performed by: INTERNAL MEDICINE

## 2018-12-19 PROCEDURE — 94660 CPAP INITIATION&MGMT: CPT

## 2018-12-19 PROCEDURE — 6360000002 HC RX W HCPCS: Performed by: CLINICAL NURSE SPECIALIST

## 2018-12-19 PROCEDURE — 82962 GLUCOSE BLOOD TEST: CPT

## 2018-12-19 PROCEDURE — 97530 THERAPEUTIC ACTIVITIES: CPT

## 2018-12-19 PROCEDURE — 80048 BASIC METABOLIC PNL TOTAL CA: CPT

## 2018-12-19 PROCEDURE — 2700000000 HC OXYGEN THERAPY PER DAY

## 2018-12-19 RX ORDER — DOXYCYCLINE HYCLATE 100 MG/1
100 CAPSULE ORAL EVERY 12 HOURS SCHEDULED
Qty: 14 CAPSULE | Refills: 0 | DISCHARGE
Start: 2018-12-19 | End: 2018-12-26

## 2018-12-19 RX ORDER — CEPHALEXIN 500 MG/1
500 CAPSULE ORAL EVERY 8 HOURS SCHEDULED
Qty: 21 CAPSULE | Refills: 0 | DISCHARGE
Start: 2018-12-19 | End: 2018-12-26

## 2018-12-19 RX ADMIN — APIXABAN 5 MG: 5 TABLET, FILM COATED ORAL at 21:19

## 2018-12-19 RX ADMIN — Medication 10 ML: at 08:27

## 2018-12-19 RX ADMIN — TRAZODONE HYDROCHLORIDE 50 MG: 50 TABLET ORAL at 21:18

## 2018-12-19 RX ADMIN — METOPROLOL TARTRATE 100 MG: 50 TABLET ORAL at 21:18

## 2018-12-19 RX ADMIN — DILTIAZEM HYDROCHLORIDE 240 MG: 240 CAPSULE, COATED, EXTENDED RELEASE ORAL at 21:19

## 2018-12-19 RX ADMIN — IPRATROPIUM BROMIDE AND ALBUTEROL SULFATE 1 AMPULE: .5; 3 SOLUTION RESPIRATORY (INHALATION) at 12:06

## 2018-12-19 RX ADMIN — FORMOTEROL FUMARATE DIHYDRATE 20 MCG: 20 SOLUTION RESPIRATORY (INHALATION) at 19:33

## 2018-12-19 RX ADMIN — ESCITALOPRAM OXALATE 20 MG: 10 TABLET ORAL at 08:26

## 2018-12-19 RX ADMIN — NYSTATIN: 100000 CREAM TOPICAL at 08:27

## 2018-12-19 RX ADMIN — TERBINAFINE HYDROCHLORIDE: 1 CREAM TOPICAL at 08:27

## 2018-12-19 RX ADMIN — DOXYCYCLINE HYCLATE 100 MG: 100 CAPSULE ORAL at 08:26

## 2018-12-19 RX ADMIN — TRAMADOL HYDROCHLORIDE 50 MG: 50 TABLET, FILM COATED ORAL at 05:22

## 2018-12-19 RX ADMIN — NYSTATIN: 100000 CREAM TOPICAL at 21:18

## 2018-12-19 RX ADMIN — ACETAMINOPHEN 650 MG: 325 TABLET ORAL at 15:09

## 2018-12-19 RX ADMIN — METOPROLOL TARTRATE 100 MG: 50 TABLET ORAL at 08:26

## 2018-12-19 RX ADMIN — BUDESONIDE 500 MCG: 0.5 INHALANT RESPIRATORY (INHALATION) at 07:58

## 2018-12-19 RX ADMIN — FORMOTEROL FUMARATE DIHYDRATE 20 MCG: 20 SOLUTION RESPIRATORY (INHALATION) at 07:58

## 2018-12-19 RX ADMIN — FLUTICASONE PROPIONATE 2 SPRAY: 50 SPRAY, METERED NASAL at 08:29

## 2018-12-19 RX ADMIN — ACETAMINOPHEN 650 MG: 325 TABLET ORAL at 21:58

## 2018-12-19 RX ADMIN — DOXYCYCLINE HYCLATE 100 MG: 100 CAPSULE ORAL at 21:18

## 2018-12-19 RX ADMIN — DILTIAZEM HYDROCHLORIDE 240 MG: 240 CAPSULE, COATED, EXTENDED RELEASE ORAL at 08:26

## 2018-12-19 RX ADMIN — PANTOPRAZOLE SODIUM 40 MG: 40 TABLET, DELAYED RELEASE ORAL at 06:09

## 2018-12-19 RX ADMIN — PETROLATUM: 420 OINTMENT TOPICAL at 08:30

## 2018-12-19 RX ADMIN — FENOFIBRATE 160 MG: 160 TABLET ORAL at 08:26

## 2018-12-19 RX ADMIN — Medication 10 ML: at 21:19

## 2018-12-19 RX ADMIN — INSULIN LISPRO 45 UNITS: 100 INJECTION, SOLUTION INTRAVENOUS; SUBCUTANEOUS at 18:05

## 2018-12-19 RX ADMIN — CEPHALEXIN 500 MG: 500 CAPSULE ORAL at 21:18

## 2018-12-19 RX ADMIN — FERROUS SULFATE TAB 325 MG (65 MG ELEMENTAL FE) 325 MG: 325 (65 FE) TAB at 08:26

## 2018-12-19 RX ADMIN — TERBINAFINE HYDROCHLORIDE: 1 CREAM TOPICAL at 21:19

## 2018-12-19 RX ADMIN — APIXABAN 5 MG: 5 TABLET, FILM COATED ORAL at 08:26

## 2018-12-19 RX ADMIN — IPRATROPIUM BROMIDE AND ALBUTEROL SULFATE 1 AMPULE: .5; 3 SOLUTION RESPIRATORY (INHALATION) at 07:58

## 2018-12-19 RX ADMIN — TORSEMIDE 20 MG: 20 TABLET ORAL at 08:26

## 2018-12-19 RX ADMIN — CEPHALEXIN 500 MG: 500 CAPSULE ORAL at 06:09

## 2018-12-19 RX ADMIN — SPIRONOLACTONE 25 MG: 25 TABLET ORAL at 08:26

## 2018-12-19 RX ADMIN — TRAMADOL HYDROCHLORIDE 50 MG: 50 TABLET, FILM COATED ORAL at 11:26

## 2018-12-19 RX ADMIN — IPRATROPIUM BROMIDE AND ALBUTEROL SULFATE 1 AMPULE: .5; 3 SOLUTION RESPIRATORY (INHALATION) at 15:49

## 2018-12-19 RX ADMIN — CEPHALEXIN 500 MG: 500 CAPSULE ORAL at 14:02

## 2018-12-19 RX ADMIN — TRAMADOL HYDROCHLORIDE 50 MG: 50 TABLET, FILM COATED ORAL at 18:06

## 2018-12-19 RX ADMIN — CETIRIZINE HYDROCHLORIDE 10 MG: 10 TABLET, FILM COATED ORAL at 08:26

## 2018-12-19 RX ADMIN — BUDESONIDE 500 MCG: 0.5 INHALANT RESPIRATORY (INHALATION) at 19:33

## 2018-12-19 RX ADMIN — INSULIN LISPRO 90 UNITS: 100 INJECTION, SOLUTION INTRAVENOUS; SUBCUTANEOUS at 08:27

## 2018-12-19 ASSESSMENT — PAIN SCALES - GENERAL
PAINLEVEL_OUTOF10: 5
PAINLEVEL_OUTOF10: 6
PAINLEVEL_OUTOF10: 7
PAINLEVEL_OUTOF10: 4
PAINLEVEL_OUTOF10: 7
PAINLEVEL_OUTOF10: 7
PAINLEVEL_OUTOF10: 6
PAINLEVEL_OUTOF10: 7
PAINLEVEL_OUTOF10: 3

## 2018-12-19 ASSESSMENT — PAIN DESCRIPTION - LOCATION
LOCATION: LEG
LOCATION: FOOT
LOCATION: FOOT
LOCATION: LEG
LOCATION: LEG

## 2018-12-19 ASSESSMENT — PAIN DESCRIPTION - FREQUENCY
FREQUENCY: INTERMITTENT
FREQUENCY: CONTINUOUS

## 2018-12-19 ASSESSMENT — PAIN DESCRIPTION - ORIENTATION
ORIENTATION: RIGHT;LEFT
ORIENTATION: RIGHT;LEFT
ORIENTATION: RIGHT

## 2018-12-19 ASSESSMENT — PAIN DESCRIPTION - PAIN TYPE
TYPE: ACUTE PAIN

## 2018-12-19 ASSESSMENT — PAIN DESCRIPTION - ONSET
ONSET: ON-GOING
ONSET: ON-GOING

## 2018-12-19 ASSESSMENT — PAIN DESCRIPTION - DESCRIPTORS
DESCRIPTORS: ACHING;DISCOMFORT
DESCRIPTORS: ACHING;DISCOMFORT
DESCRIPTORS: ACHING;DISCOMFORT;SORE

## 2018-12-19 ASSESSMENT — PAIN DESCRIPTION - PROGRESSION
CLINICAL_PROGRESSION: NOT CHANGED
CLINICAL_PROGRESSION: NOT CHANGED

## 2018-12-19 NOTE — PROGRESS NOTES
Continue with physical therapy current plan of care. Farhad Barrera Sa., P.T.   License Number: [id number]

## 2018-12-19 NOTE — PROGRESS NOTES
. At end of session sitting in chair  all lines and tubes intact, call light within reach. · Pt has made Fair  progress towards set goals.    · Continue with current plan of care    Time in: 0940  Time [id number]      Juan Toledo OTR/L [id number]

## 2018-12-20 LAB
ANION GAP SERPL CALCULATED.3IONS-SCNC: 9 MMOL/L (ref 7–16)
BUN BLDV-MCNC: 30 MG/DL (ref 8–23)
CALCIUM SERPL-MCNC: 9.6 MG/DL (ref 8.6–10.2)
CHLORIDE BLD-SCNC: 93 MMOL/L (ref 98–107)
CO2: 38 MMOL/L (ref 22–29)
CREAT SERPL-MCNC: 1.1 MG/DL (ref 0.5–1)
GFR AFRICAN AMERICAN: >60
GFR NON-AFRICAN AMERICAN: 50 ML/MIN/1.73
GLUCOSE BLD-MCNC: 154 MG/DL (ref 74–99)
METER GLUCOSE: 179 MG/DL (ref 74–99)
METER GLUCOSE: 184 MG/DL (ref 74–99)
METER GLUCOSE: 195 MG/DL (ref 74–99)
METER GLUCOSE: 345 MG/DL (ref 74–99)
METER GLUCOSE: 88 MG/DL (ref 74–99)
POTASSIUM SERPL-SCNC: 4.4 MMOL/L (ref 3.5–5)
SODIUM BLD-SCNC: 140 MMOL/L (ref 132–146)

## 2018-12-20 PROCEDURE — 1200000000 HC SEMI PRIVATE

## 2018-12-20 PROCEDURE — 6370000000 HC RX 637 (ALT 250 FOR IP): Performed by: INTERNAL MEDICINE

## 2018-12-20 PROCEDURE — 99239 HOSP IP/OBS DSCHRG MGMT >30: CPT | Performed by: INTERNAL MEDICINE

## 2018-12-20 PROCEDURE — 94640 AIRWAY INHALATION TREATMENT: CPT

## 2018-12-20 PROCEDURE — 99232 SBSQ HOSP IP/OBS MODERATE 35: CPT | Performed by: INTERNAL MEDICINE

## 2018-12-20 PROCEDURE — APPSS45 APP SPLIT SHARED TIME 31-45 MINUTES: Performed by: NURSE PRACTITIONER

## 2018-12-20 PROCEDURE — 6360000002 HC RX W HCPCS: Performed by: CLINICAL NURSE SPECIALIST

## 2018-12-20 PROCEDURE — 80048 BASIC METABOLIC PNL TOTAL CA: CPT

## 2018-12-20 PROCEDURE — 82962 GLUCOSE BLOOD TEST: CPT

## 2018-12-20 PROCEDURE — 6370000000 HC RX 637 (ALT 250 FOR IP): Performed by: CLINICAL NURSE SPECIALIST

## 2018-12-20 PROCEDURE — 94660 CPAP INITIATION&MGMT: CPT

## 2018-12-20 PROCEDURE — 36415 COLL VENOUS BLD VENIPUNCTURE: CPT

## 2018-12-20 PROCEDURE — 2700000000 HC OXYGEN THERAPY PER DAY

## 2018-12-20 PROCEDURE — 2580000003 HC RX 258: Performed by: INTERNAL MEDICINE

## 2018-12-20 RX ORDER — TRAMADOL HYDROCHLORIDE 50 MG/1
50 TABLET ORAL EVERY 6 HOURS PRN
Qty: 10 TABLET | Refills: 0 | Status: SHIPPED | OUTPATIENT
Start: 2018-12-20 | End: 2018-12-23

## 2018-12-20 RX ORDER — SPIRONOLACTONE 25 MG/1
25 TABLET ORAL DAILY
Qty: 30 TABLET | Refills: 0
Start: 2018-12-21 | End: 2019-12-19 | Stop reason: SDUPTHER

## 2018-12-20 RX ORDER — TORSEMIDE 20 MG/1
20 TABLET ORAL DAILY
Qty: 30 TABLET | Refills: 0
Start: 2018-12-21 | End: 2019-01-11 | Stop reason: SDUPTHER

## 2018-12-20 RX ORDER — PSEUDOEPHEDRINE HCL 30 MG
100 TABLET ORAL 3 TIMES DAILY PRN
Qty: 10 CAPSULE | Refills: 0
Start: 2018-12-20 | End: 2019-09-30 | Stop reason: ALTCHOICE

## 2018-12-20 RX ORDER — NICOTINE POLACRILEX 4 MG
15 LOZENGE BUCCAL PRN
Qty: 45 G | Refills: 0
Start: 2018-12-20 | End: 2020-11-17

## 2018-12-20 RX ADMIN — DILTIAZEM HYDROCHLORIDE 240 MG: 240 CAPSULE, COATED, EXTENDED RELEASE ORAL at 22:05

## 2018-12-20 RX ADMIN — CEPHALEXIN 500 MG: 500 CAPSULE ORAL at 13:57

## 2018-12-20 RX ADMIN — INSULIN LISPRO 90 UNITS: 100 INJECTION, SOLUTION INTRAVENOUS; SUBCUTANEOUS at 08:49

## 2018-12-20 RX ADMIN — TRAMADOL HYDROCHLORIDE 50 MG: 50 TABLET, FILM COATED ORAL at 12:15

## 2018-12-20 RX ADMIN — DOXYCYCLINE HYCLATE 100 MG: 100 CAPSULE ORAL at 08:47

## 2018-12-20 RX ADMIN — NYSTATIN: 100000 CREAM TOPICAL at 13:41

## 2018-12-20 RX ADMIN — DILTIAZEM HYDROCHLORIDE 240 MG: 240 CAPSULE, COATED, EXTENDED RELEASE ORAL at 08:48

## 2018-12-20 RX ADMIN — SPIRONOLACTONE 25 MG: 25 TABLET ORAL at 10:24

## 2018-12-20 RX ADMIN — FERROUS SULFATE TAB 325 MG (65 MG ELEMENTAL FE) 325 MG: 325 (65 FE) TAB at 08:47

## 2018-12-20 RX ADMIN — Medication 10 ML: at 13:41

## 2018-12-20 RX ADMIN — DOXYCYCLINE HYCLATE 100 MG: 100 CAPSULE ORAL at 22:05

## 2018-12-20 RX ADMIN — BUDESONIDE 500 MCG: 0.5 INHALANT RESPIRATORY (INHALATION) at 19:56

## 2018-12-20 RX ADMIN — FORMOTEROL FUMARATE DIHYDRATE 20 MCG: 20 SOLUTION RESPIRATORY (INHALATION) at 19:56

## 2018-12-20 RX ADMIN — TERBINAFINE HYDROCHLORIDE: 1 CREAM TOPICAL at 22:06

## 2018-12-20 RX ADMIN — ACETAMINOPHEN 650 MG: 325 TABLET ORAL at 15:35

## 2018-12-20 RX ADMIN — PANTOPRAZOLE SODIUM 40 MG: 40 TABLET, DELAYED RELEASE ORAL at 06:13

## 2018-12-20 RX ADMIN — CEPHALEXIN 500 MG: 500 CAPSULE ORAL at 06:13

## 2018-12-20 RX ADMIN — INSULIN GLARGINE 100 UNITS: 100 INJECTION, SOLUTION SUBCUTANEOUS at 22:10

## 2018-12-20 RX ADMIN — FORMOTEROL FUMARATE DIHYDRATE 20 MCG: 20 SOLUTION RESPIRATORY (INHALATION) at 08:04

## 2018-12-20 RX ADMIN — TRAMADOL HYDROCHLORIDE 50 MG: 50 TABLET, FILM COATED ORAL at 17:52

## 2018-12-20 RX ADMIN — TRAMADOL HYDROCHLORIDE 50 MG: 50 TABLET, FILM COATED ORAL at 23:53

## 2018-12-20 RX ADMIN — IPRATROPIUM BROMIDE AND ALBUTEROL SULFATE 1 AMPULE: .5; 3 SOLUTION RESPIRATORY (INHALATION) at 19:56

## 2018-12-20 RX ADMIN — APIXABAN 5 MG: 5 TABLET, FILM COATED ORAL at 22:05

## 2018-12-20 RX ADMIN — FENOFIBRATE 160 MG: 160 TABLET ORAL at 08:47

## 2018-12-20 RX ADMIN — FLUTICASONE PROPIONATE 2 SPRAY: 50 SPRAY, METERED NASAL at 13:40

## 2018-12-20 RX ADMIN — ESCITALOPRAM OXALATE 20 MG: 10 TABLET ORAL at 08:48

## 2018-12-20 RX ADMIN — IPRATROPIUM BROMIDE AND ALBUTEROL SULFATE 1 AMPULE: .5; 3 SOLUTION RESPIRATORY (INHALATION) at 16:04

## 2018-12-20 RX ADMIN — TORSEMIDE 20 MG: 20 TABLET ORAL at 08:47

## 2018-12-20 RX ADMIN — ACETAMINOPHEN 650 MG: 325 TABLET ORAL at 08:48

## 2018-12-20 RX ADMIN — METOPROLOL TARTRATE 100 MG: 50 TABLET ORAL at 22:04

## 2018-12-20 RX ADMIN — METOPROLOL TARTRATE 100 MG: 50 TABLET ORAL at 08:47

## 2018-12-20 RX ADMIN — TRAMADOL HYDROCHLORIDE 50 MG: 50 TABLET, FILM COATED ORAL at 00:07

## 2018-12-20 RX ADMIN — ACETAMINOPHEN 650 MG: 325 TABLET ORAL at 03:57

## 2018-12-20 RX ADMIN — BUDESONIDE 500 MCG: 0.5 INHALANT RESPIRATORY (INHALATION) at 08:04

## 2018-12-20 RX ADMIN — IPRATROPIUM BROMIDE AND ALBUTEROL SULFATE 1 AMPULE: .5; 3 SOLUTION RESPIRATORY (INHALATION) at 11:45

## 2018-12-20 RX ADMIN — CEPHALEXIN 500 MG: 500 CAPSULE ORAL at 22:05

## 2018-12-20 RX ADMIN — TRAZODONE HYDROCHLORIDE 50 MG: 50 TABLET ORAL at 22:11

## 2018-12-20 RX ADMIN — TRAMADOL HYDROCHLORIDE 50 MG: 50 TABLET, FILM COATED ORAL at 06:13

## 2018-12-20 RX ADMIN — NYSTATIN: 100000 CREAM TOPICAL at 22:07

## 2018-12-20 RX ADMIN — APIXABAN 5 MG: 5 TABLET, FILM COATED ORAL at 08:48

## 2018-12-20 RX ADMIN — CETIRIZINE HYDROCHLORIDE 10 MG: 10 TABLET, FILM COATED ORAL at 12:15

## 2018-12-20 ASSESSMENT — PAIN DESCRIPTION - FREQUENCY
FREQUENCY: CONTINUOUS

## 2018-12-20 ASSESSMENT — PAIN SCALES - GENERAL
PAINLEVEL_OUTOF10: 4
PAINLEVEL_OUTOF10: 5
PAINLEVEL_OUTOF10: 5
PAINLEVEL_OUTOF10: 7
PAINLEVEL_OUTOF10: 6
PAINLEVEL_OUTOF10: 8
PAINLEVEL_OUTOF10: 7
PAINLEVEL_OUTOF10: 8
PAINLEVEL_OUTOF10: 9
PAINLEVEL_OUTOF10: 4
PAINLEVEL_OUTOF10: 0
PAINLEVEL_OUTOF10: 3
PAINLEVEL_OUTOF10: 4

## 2018-12-20 ASSESSMENT — PAIN DESCRIPTION - PROGRESSION
CLINICAL_PROGRESSION: NOT CHANGED

## 2018-12-20 ASSESSMENT — PAIN DESCRIPTION - ORIENTATION
ORIENTATION: RIGHT;LEFT

## 2018-12-20 ASSESSMENT — PAIN DESCRIPTION - LOCATION
LOCATION: FOOT

## 2018-12-20 ASSESSMENT — PAIN DESCRIPTION - DESCRIPTORS
DESCRIPTORS: ACHING;DISCOMFORT
DESCRIPTORS: ACHING;DISCOMFORT
DESCRIPTORS: ACHING;DISCOMFORT;SORE
DESCRIPTORS: ACHING;DISCOMFORT
DESCRIPTORS: ACHING;DISCOMFORT

## 2018-12-20 ASSESSMENT — PAIN DESCRIPTION - ONSET
ONSET: ON-GOING

## 2018-12-20 ASSESSMENT — PAIN DESCRIPTION - PAIN TYPE
TYPE: ACUTE PAIN

## 2018-12-20 NOTE — DISCHARGE SUMMARY
[facility] Physician Discharge Summary       [facility]3], [address]  [address]  [facility] [address]  [phone]    In 1 week      Chris Hu 59  [address] [facility],[address] [phone]    Call in 1 month  Please call for a follow up appointment with Dr. Isaura Cardona upon discharge. Thank you. [address]  [address] [facility]  [address]  [place] [phone]    In 2 weeks      Leda Santana MD  [address]  [phone]    In 2 weeks        Activity level: as tolerated    Diet: DIET CARB CONTROL; Carb Control: 4 carb choices (60 gms)/meal; No Caffeine    Condition on discharge: stable  Dispo: Noe Crowe    Patient ID:  Berneice Ganser  [id number]  70 y.o.  1955    Admit date: 12/15/2018    Discharge date and time:  12/20/2018  3:21 PM    Admission Diagnoses: Principal Problem:    Acute diastolic congestive heart failure (HCC)  Active Problems:    Type 2 diabetes mellitus with diabetic neuropathy, with long-term current use of insulin (HCC)    Hypertension    Hyperlipidemia    PAF (paroxysmal atrial fibrillation) (HCC) - currently in SR    Pulmonary hypertension    COPD (chronic obstructive pulmonary disease) (HCC)    Acute on chronic respiratory failure with hypoxia (HCC)    Cellulitis of left lower extremity    Acute on chronic anemia    Morbid obesity ([address]    Acute hypoxemic respiratory failure (HCC)    Acute on chronic diastolic heart failure ([address]  Resolved Problems:    * No resolved hospital problems.  *      Discharge Diagnoses: Principal Problem:    Acute diastolic congestive heart failure (HCC)  Active Problems:    Type 2 diabetes mellitus with diabetic neuropathy, with long-term current use of insulin (HCC)    Hypertension    Hyperlipidemia    PAF (paroxysmal atrial fibrillation) ([address] - currently in SR

## 2018-12-20 NOTE — PROGRESS NOTES
Occupational Therapy  Patient treatment attempted this AM.  Patient sitting in the chair. Declined to participate due to pain in feet. Will attempt at a later time.   Mari[place] MEJIA/L [id number]

## 2018-12-20 NOTE — PROGRESS NOTES
12/19/18 0826    fenofibrate tablet 160 mg  160 mg Oral Daily Genevieve Alford MD   160 mg at 12/19/18 2671    ferrous sulfate tablet 325 mg  325 mg Oral Daily with breakfast Genevieve Alford MD   325 mg at 12/19/18 9900    cetirizine (ZYRTEC) tablet 10 mg  10 mg Oral Daily Genevieve Alford MD   10 mg at 12/19/18 0826    fluticasone (FLONASE) 50 MCG/ACT nasal spray 2 spray  2 spray Nasal Daily Genevieve Alford MD   2 spray at 12/19/18 0829    insulin glargine (LANTUS) injection vial 100 Units  100 Units Subcutaneous Nightly Genevieve Alford MD   40 Units at 12/16/18 2043    insulin lispro (HUMALOG) injection vial 90 Units  90 Units Subcutaneous TID AC Genevieve Alford MD   45 Units at 12/19/18 1805    ipratropium-albuterol (DUONEB) nebulizer solution 3 mL  1 vial Inhalation Q4H PRN Genevieve Alford MD        pantoprazole (PROTONIX) tablet 40 mg  40 mg Oral QAM AC Genevieve Alford MD   40 mg at 12/20/18 5577    metoprolol tartrate (LOPRESSOR) tablet 100 mg  100 mg Oral BID Genevieve Alford MD   100 mg at 12/19/18 2118    nystatin (MYCOSTATIN) cream   Topical BID Genevieve Alford MD        spironolactone (ALDACTONE) tablet 25 mg  25 mg Oral Daily Genevieve Alford MD   25 mg at 12/19/18 9301    terbinafine (LAMISIL) 1 % cream   Topical BID Genevieve Alford MD        traZODone (DESYREL) tablet 50 mg  50 mg Oral Nightly Genevieve Alford MD   50 mg at 12/19/18 2118    sodium chloride flush 0.9 % injection 10 mL  10 mL Intravenous 2 times per day Genevieve Alford MD   10 mL at 12/19/18 2119    sodium chloride flush 0.9 % injection 10 mL  10 mL Intravenous PRN Genevieve Alford MD   10 mL at 12/16/18 1158    magnesium hydroxide (MILK OF MAGNESIA) 400 MG/5ML suspension 30 mL  30 mL Oral Daily PRN Genevieve Alford MD   30 mL at 12/17/18 0330    ondansetron (ZOFRAN) injection 4 mg  4 mg Intravenous Q6H PRN Genevieve Alford MD        traMADol

## 2018-12-20 NOTE — PROGRESS NOTES
Pt's O2 was 99% on 4 L while sitting in chair. Rechecked 2 minutes later on 3 L and was 98%. Will continue to monitor. 10:47 AM    96% on 3 L. 94% on 2 L two minutes later.  12:07 PM

## 2018-12-21 VITALS
WEIGHT: 293 LBS | RESPIRATION RATE: 18 BRPM | HEART RATE: 78 BPM | OXYGEN SATURATION: 96 % | BODY MASS INDEX: 51.91 KG/M2 | DIASTOLIC BLOOD PRESSURE: 63 MMHG | SYSTOLIC BLOOD PRESSURE: 135 MMHG | HEIGHT: 63 IN | TEMPERATURE: 97.7 F

## 2018-12-21 LAB — METER GLUCOSE: 214 MG/DL (ref 74–99)

## 2018-12-21 PROCEDURE — 6370000000 HC RX 637 (ALT 250 FOR IP): Performed by: INTERNAL MEDICINE

## 2018-12-21 PROCEDURE — 6370000000 HC RX 637 (ALT 250 FOR IP): Performed by: CLINICAL NURSE SPECIALIST

## 2018-12-21 PROCEDURE — 82962 GLUCOSE BLOOD TEST: CPT

## 2018-12-21 PROCEDURE — 2700000000 HC OXYGEN THERAPY PER DAY

## 2018-12-21 PROCEDURE — 6360000002 HC RX W HCPCS: Performed by: CLINICAL NURSE SPECIALIST

## 2018-12-21 PROCEDURE — 94660 CPAP INITIATION&MGMT: CPT

## 2018-12-21 PROCEDURE — 94640 AIRWAY INHALATION TREATMENT: CPT

## 2018-12-21 RX ADMIN — IPRATROPIUM BROMIDE AND ALBUTEROL SULFATE 1 AMPULE: .5; 3 SOLUTION RESPIRATORY (INHALATION) at 08:04

## 2018-12-21 RX ADMIN — DOXYCYCLINE HYCLATE 100 MG: 100 CAPSULE ORAL at 08:44

## 2018-12-21 RX ADMIN — TERBINAFINE HYDROCHLORIDE: 1 CREAM TOPICAL at 08:47

## 2018-12-21 RX ADMIN — BUDESONIDE 500 MCG: 0.5 INHALANT RESPIRATORY (INHALATION) at 08:04

## 2018-12-21 RX ADMIN — METOPROLOL TARTRATE 100 MG: 50 TABLET ORAL at 08:43

## 2018-12-21 RX ADMIN — FORMOTEROL FUMARATE DIHYDRATE 20 MCG: 20 SOLUTION RESPIRATORY (INHALATION) at 08:04

## 2018-12-21 RX ADMIN — SPIRONOLACTONE 25 MG: 25 TABLET ORAL at 08:43

## 2018-12-21 RX ADMIN — CEPHALEXIN 500 MG: 500 CAPSULE ORAL at 06:00

## 2018-12-21 RX ADMIN — NYSTATIN: 100000 CREAM TOPICAL at 08:52

## 2018-12-21 RX ADMIN — ACETAMINOPHEN 650 MG: 325 TABLET ORAL at 10:59

## 2018-12-21 RX ADMIN — FERROUS SULFATE TAB 325 MG (65 MG ELEMENTAL FE) 325 MG: 325 (65 FE) TAB at 08:44

## 2018-12-21 RX ADMIN — TORSEMIDE 20 MG: 20 TABLET ORAL at 08:44

## 2018-12-21 RX ADMIN — PETROLATUM: 420 OINTMENT TOPICAL at 08:49

## 2018-12-21 RX ADMIN — ESCITALOPRAM OXALATE 20 MG: 10 TABLET ORAL at 08:44

## 2018-12-21 RX ADMIN — FENOFIBRATE 160 MG: 160 TABLET ORAL at 08:44

## 2018-12-21 RX ADMIN — CETIRIZINE HYDROCHLORIDE 10 MG: 10 TABLET, FILM COATED ORAL at 08:44

## 2018-12-21 RX ADMIN — TRAMADOL HYDROCHLORIDE 50 MG: 50 TABLET, FILM COATED ORAL at 06:00

## 2018-12-21 RX ADMIN — DILTIAZEM HYDROCHLORIDE 240 MG: 240 CAPSULE, COATED, EXTENDED RELEASE ORAL at 08:43

## 2018-12-21 RX ADMIN — APIXABAN 5 MG: 5 TABLET, FILM COATED ORAL at 08:44

## 2018-12-21 RX ADMIN — FLUTICASONE PROPIONATE 2 SPRAY: 50 SPRAY, METERED NASAL at 08:48

## 2018-12-21 RX ADMIN — PANTOPRAZOLE SODIUM 40 MG: 40 TABLET, DELAYED RELEASE ORAL at 06:00

## 2018-12-21 ASSESSMENT — PAIN DESCRIPTION - FREQUENCY
FREQUENCY: CONTINUOUS
FREQUENCY: CONTINUOUS

## 2018-12-21 ASSESSMENT — PAIN DESCRIPTION - LOCATION
LOCATION: FOOT
LOCATION: FOOT

## 2018-12-21 ASSESSMENT — PAIN DESCRIPTION - PROGRESSION: CLINICAL_PROGRESSION: NOT CHANGED

## 2018-12-21 ASSESSMENT — PAIN DESCRIPTION - ONSET
ONSET: ON-GOING
ONSET: ON-GOING

## 2018-12-21 ASSESSMENT — PAIN DESCRIPTION - ORIENTATION
ORIENTATION: RIGHT;LEFT
ORIENTATION: RIGHT;LEFT

## 2018-12-21 ASSESSMENT — PAIN DESCRIPTION - DESCRIPTORS
DESCRIPTORS: ACHING;DISCOMFORT
DESCRIPTORS: ACHING;DISCOMFORT;SORE

## 2018-12-21 ASSESSMENT — PAIN SCALES - GENERAL
PAINLEVEL_OUTOF10: 6
PAINLEVEL_OUTOF10: 8
PAINLEVEL_OUTOF10: 4
PAINLEVEL_OUTOF10: 3

## 2018-12-21 ASSESSMENT — PAIN DESCRIPTION - PAIN TYPE: TYPE: ACUTE PAIN

## 2018-12-21 NOTE — PATIENT CARE CONFERENCE
[facility] Quality Flow/Interdisciplinary Rounds Progress Note        Quality Flow Rounds held on December 21, 2018    Disciplines Attending:  Bedside Nurse, ,  and Nursing Unit 24[address] Harish was admitted on 12/15/2018  5:46 PM    Anticipated Discharge Date:  Expected Discharge Date: 12/19/18    Disposition:    Kavon Score:  Kavon Scale Score: 18    Readmission Score:         Discussed patient goal for the day, patient clinical progression, and barriers to discharge.   The following Goal(s) of the Day/Commitment(s) have been identified:  d/c to [address]  December 21, 2018

## 2019-01-07 LAB
FUNGUS (MYCOLOGY) CULTURE: ABNORMAL
FUNGUS (MYCOLOGY) CULTURE: ABNORMAL
FUNGUS STAIN: ABNORMAL
ORGANISM: ABNORMAL

## 2019-01-09 RX ORDER — LANSOPRAZOLE 30 MG/1
CAPSULE, DELAYED RELEASE ORAL
Qty: 30 CAPSULE | Refills: 5 | Status: SHIPPED | OUTPATIENT
Start: 2019-01-09 | End: 2019-01-22

## 2019-01-10 ENCOUNTER — TELEPHONE (OUTPATIENT)
Dept: CARDIOLOGY CLINIC | Age: 64
End: 2019-01-10

## 2019-01-10 ENCOUNTER — CARE COORDINATION (OUTPATIENT)
Dept: CARE COORDINATION | Age: 64
End: 2019-01-10

## 2019-01-11 ENCOUNTER — TELEPHONE (OUTPATIENT)
Dept: CARDIOLOGY CLINIC | Age: 64
End: 2019-01-11

## 2019-01-11 ENCOUNTER — OFFICE VISIT (OUTPATIENT)
Dept: CARDIOLOGY CLINIC | Age: 64
End: 2019-01-11
Payer: COMMERCIAL

## 2019-01-11 VITALS
WEIGHT: 293 LBS | SYSTOLIC BLOOD PRESSURE: 120 MMHG | RESPIRATION RATE: 16 BRPM | BODY MASS INDEX: 51.91 KG/M2 | HEIGHT: 63 IN | HEART RATE: 76 BPM | DIASTOLIC BLOOD PRESSURE: 52 MMHG | OXYGEN SATURATION: 98 %

## 2019-01-11 DIAGNOSIS — E11.65 UNCONTROLLED TYPE 2 DIABETES MELLITUS WITH HYPERGLYCEMIA (HCC): ICD-10-CM

## 2019-01-11 DIAGNOSIS — E78.5 DYSLIPIDEMIA: ICD-10-CM

## 2019-01-11 DIAGNOSIS — I89.0 LYMPHEDEMA OF BOTH LOWER EXTREMITIES: ICD-10-CM

## 2019-01-11 DIAGNOSIS — R06.02 SHORTNESS OF BREATH: ICD-10-CM

## 2019-01-11 DIAGNOSIS — R06.09 DYSPNEA ON EXERTION: ICD-10-CM

## 2019-01-11 DIAGNOSIS — I10 ESSENTIAL HYPERTENSION: ICD-10-CM

## 2019-01-11 DIAGNOSIS — I48.0 PAF (PAROXYSMAL ATRIAL FIBRILLATION) (HCC): ICD-10-CM

## 2019-01-11 DIAGNOSIS — I50.30 (HFPEF) HEART FAILURE WITH PRESERVED EJECTION FRACTION (HCC): Primary | ICD-10-CM

## 2019-01-11 DIAGNOSIS — E66.01 MORBID OBESITY (HCC): ICD-10-CM

## 2019-01-11 DIAGNOSIS — J44.9 CHRONIC OBSTRUCTIVE PULMONARY DISEASE, UNSPECIFIED COPD TYPE (HCC): ICD-10-CM

## 2019-01-11 DIAGNOSIS — D64.9 ANEMIA, UNSPECIFIED TYPE: ICD-10-CM

## 2019-01-11 PROCEDURE — 2022F DILAT RTA XM EVC RTNOPTHY: CPT | Performed by: NURSE PRACTITIONER

## 2019-01-11 PROCEDURE — 3023F SPIROM DOC REV: CPT | Performed by: NURSE PRACTITIONER

## 2019-01-11 PROCEDURE — G8427 DOCREV CUR MEDS BY ELIG CLIN: HCPCS | Performed by: NURSE PRACTITIONER

## 2019-01-11 PROCEDURE — 1036F TOBACCO NON-USER: CPT | Performed by: NURSE PRACTITIONER

## 2019-01-11 PROCEDURE — 3046F HEMOGLOBIN A1C LEVEL >9.0%: CPT | Performed by: NURSE PRACTITIONER

## 2019-01-11 PROCEDURE — G8926 SPIRO NO PERF OR DOC: HCPCS | Performed by: NURSE PRACTITIONER

## 2019-01-11 PROCEDURE — G8400 PT W/DXA NO RESULTS DOC: HCPCS | Performed by: NURSE PRACTITIONER

## 2019-01-11 PROCEDURE — 3017F COLORECTAL CA SCREEN DOC REV: CPT | Performed by: NURSE PRACTITIONER

## 2019-01-11 PROCEDURE — 99214 OFFICE O/P EST MOD 30 MIN: CPT | Performed by: NURSE PRACTITIONER

## 2019-01-11 PROCEDURE — 1111F DSCHRG MED/CURRENT MED MERGE: CPT | Performed by: NURSE PRACTITIONER

## 2019-01-11 PROCEDURE — G8484 FLU IMMUNIZE NO ADMIN: HCPCS | Performed by: NURSE PRACTITIONER

## 2019-01-11 PROCEDURE — 1101F PT FALLS ASSESS-DOCD LE1/YR: CPT | Performed by: NURSE PRACTITIONER

## 2019-01-11 PROCEDURE — 4040F PNEUMOC VAC/ADMIN/RCVD: CPT | Performed by: NURSE PRACTITIONER

## 2019-01-11 PROCEDURE — 93000 ELECTROCARDIOGRAM COMPLETE: CPT | Performed by: INTERNAL MEDICINE

## 2019-01-11 PROCEDURE — 1090F PRES/ABSN URINE INCON ASSESS: CPT | Performed by: NURSE PRACTITIONER

## 2019-01-11 PROCEDURE — 1123F ACP DISCUSS/DSCN MKR DOCD: CPT | Performed by: NURSE PRACTITIONER

## 2019-01-11 PROCEDURE — G8417 CALC BMI ABV UP PARAM F/U: HCPCS | Performed by: NURSE PRACTITIONER

## 2019-01-11 RX ORDER — ALPRAZOLAM 0.25 MG/1
0.25 TABLET ORAL NIGHTLY PRN
COMMUNITY
End: 2019-06-18

## 2019-01-11 RX ORDER — OXYCODONE AND ACETAMINOPHEN 7.5; 325 MG/1; MG/1
1 TABLET ORAL EVERY 6 HOURS PRN
COMMUNITY
End: 2019-09-30 | Stop reason: ALTCHOICE

## 2019-01-11 RX ORDER — ACETAMINOPHEN 325 MG/1
650 TABLET ORAL EVERY 6 HOURS PRN
COMMUNITY
End: 2021-06-14

## 2019-01-11 RX ORDER — TORSEMIDE 20 MG/1
20 TABLET ORAL 2 TIMES DAILY
Qty: 30 TABLET | Refills: 0 | Status: SHIPPED | OUTPATIENT
Start: 2019-01-11 | End: 2019-02-14

## 2019-01-14 ENCOUNTER — HOSPITAL ENCOUNTER (OUTPATIENT)
Dept: OTHER | Age: 64
Setting detail: THERAPIES SERIES
Discharge: HOME | End: 2019-01-14
Payer: COMMERCIAL

## 2019-01-14 VITALS
RESPIRATION RATE: 18 BRPM | SYSTOLIC BLOOD PRESSURE: 126 MMHG | WEIGHT: 293 LBS | DIASTOLIC BLOOD PRESSURE: 59 MMHG | HEART RATE: 69 BPM | BODY MASS INDEX: 54.56 KG/M2

## 2019-01-14 LAB
ANION GAP SERPL CALCULATED.3IONS-SCNC: 13 MMOL/L (ref 7–16)
BUN BLDV-MCNC: 31 MG/DL (ref 8–23)
CALCIUM SERPL-MCNC: 9 MG/DL (ref 8.6–10.2)
CHLORIDE BLD-SCNC: 97 MMOL/L (ref 98–107)
CO2: 30 MMOL/L (ref 22–29)
CREAT SERPL-MCNC: 1.4 MG/DL (ref 0.5–1)
FERRITIN: 213 NG/ML
GFR AFRICAN AMERICAN: 46
GFR NON-AFRICAN AMERICAN: 38 ML/MIN/1.73
GLUCOSE BLD-MCNC: 271 MG/DL (ref 74–99)
IRON SATURATION: 16 % (ref 15–50)
IRON: 38 MCG/DL (ref 37–145)
POTASSIUM SERPL-SCNC: 4.3 MMOL/L (ref 3.5–5)
PRO-BNP: 929 PG/ML (ref 0–125)
SODIUM BLD-SCNC: 140 MMOL/L (ref 132–146)
TOTAL IRON BINDING CAPACITY: 241 MCG/DL (ref 250–450)
TRANSFERRIN: 202 MG/DL (ref 200–360)

## 2019-01-14 PROCEDURE — 96374 THER/PROPH/DIAG INJ IV PUSH: CPT

## 2019-01-14 PROCEDURE — 82728 ASSAY OF FERRITIN: CPT

## 2019-01-14 PROCEDURE — 36415 COLL VENOUS BLD VENIPUNCTURE: CPT

## 2019-01-14 PROCEDURE — 84466 ASSAY OF TRANSFERRIN: CPT

## 2019-01-14 PROCEDURE — 2580000003 HC RX 258: Performed by: INTERNAL MEDICINE

## 2019-01-14 PROCEDURE — 80048 BASIC METABOLIC PNL TOTAL CA: CPT

## 2019-01-14 PROCEDURE — 2500000003 HC RX 250 WO HCPCS: Performed by: INTERNAL MEDICINE

## 2019-01-14 PROCEDURE — 83550 IRON BINDING TEST: CPT

## 2019-01-14 PROCEDURE — 83880 ASSAY OF NATRIURETIC PEPTIDE: CPT

## 2019-01-14 PROCEDURE — 99214 OFFICE O/P EST MOD 30 MIN: CPT

## 2019-01-14 PROCEDURE — 83540 ASSAY OF IRON: CPT

## 2019-01-14 RX ORDER — BUMETANIDE 0.25 MG/ML
2 INJECTION, SOLUTION INTRAMUSCULAR; INTRAVENOUS ONCE
Status: COMPLETED | OUTPATIENT
Start: 2019-01-14 | End: 2019-01-14

## 2019-01-14 RX ORDER — SODIUM CHLORIDE 0.9 % (FLUSH) 0.9 %
10 SYRINGE (ML) INJECTION 2 TIMES DAILY
Status: DISCONTINUED | OUTPATIENT
Start: 2019-01-14 | End: 2019-01-15 | Stop reason: HOSPADM

## 2019-01-14 RX ADMIN — BUMETANIDE 2 MG: 0.25 INJECTION INTRAMUSCULAR; INTRAVENOUS at 09:35

## 2019-01-14 RX ADMIN — Medication 10 ML: at 09:35

## 2019-01-15 DIAGNOSIS — E11.42 DIABETIC POLYNEUROPATHY ASSOCIATED WITH TYPE 2 DIABETES MELLITUS (HCC): ICD-10-CM

## 2019-01-15 RX ORDER — OXYCODONE AND ACETAMINOPHEN 10; 325 MG/1; MG/1
1 TABLET ORAL EVERY 8 HOURS PRN
Qty: 15 TABLET | Refills: 0 | Status: SHIPPED | OUTPATIENT
Start: 2019-01-15 | End: 2019-01-20

## 2019-01-22 LAB
AFB CULTURE (MYCOBACTERIA): NORMAL
AFB SMEAR: NORMAL

## 2019-01-22 RX ORDER — LANSOPRAZOLE 15 MG/1
15 CAPSULE, DELAYED RELEASE ORAL
Qty: 30 CAPSULE | Refills: 3 | Status: SHIPPED | OUTPATIENT
Start: 2019-01-22 | End: 2019-03-21

## 2019-01-25 ENCOUNTER — APPOINTMENT (OUTPATIENT)
Dept: OTHER | Age: 64
End: 2019-01-25
Payer: COMMERCIAL

## 2019-01-25 ENCOUNTER — HOSPITAL ENCOUNTER (OUTPATIENT)
Dept: OTHER | Age: 64
Setting detail: THERAPIES SERIES
Discharge: HOME | End: 2019-01-25
Payer: COMMERCIAL

## 2019-01-25 VITALS
WEIGHT: 292 LBS | HEART RATE: 69 BPM | RESPIRATION RATE: 18 BRPM | DIASTOLIC BLOOD PRESSURE: 65 MMHG | BODY MASS INDEX: 51.73 KG/M2 | SYSTOLIC BLOOD PRESSURE: 142 MMHG

## 2019-01-25 LAB
ANION GAP SERPL CALCULATED.3IONS-SCNC: 12 MMOL/L (ref 7–16)
BUN BLDV-MCNC: 42 MG/DL (ref 8–23)
CALCIUM SERPL-MCNC: 9.7 MG/DL (ref 8.6–10.2)
CHLORIDE BLD-SCNC: 91 MMOL/L (ref 98–107)
CO2: 34 MMOL/L (ref 22–29)
CREAT SERPL-MCNC: 1.3 MG/DL (ref 0.5–1)
GFR AFRICAN AMERICAN: 50
GFR NON-AFRICAN AMERICAN: 41 ML/MIN/1.73
GLUCOSE BLD-MCNC: 312 MG/DL (ref 74–99)
POTASSIUM SERPL-SCNC: 4.8 MMOL/L (ref 3.5–5)
PRO-BNP: 460 PG/ML (ref 0–125)
SODIUM BLD-SCNC: 137 MMOL/L (ref 132–146)

## 2019-01-25 PROCEDURE — 99214 OFFICE O/P EST MOD 30 MIN: CPT

## 2019-01-25 PROCEDURE — 80048 BASIC METABOLIC PNL TOTAL CA: CPT

## 2019-01-25 PROCEDURE — 36415 COLL VENOUS BLD VENIPUNCTURE: CPT

## 2019-01-25 PROCEDURE — 83880 ASSAY OF NATRIURETIC PEPTIDE: CPT

## 2019-01-30 ENCOUNTER — OFFICE VISIT (OUTPATIENT)
Dept: CARDIOLOGY CLINIC | Age: 64
End: 2019-01-30
Payer: COMMERCIAL

## 2019-01-30 VITALS
WEIGHT: 293 LBS | OXYGEN SATURATION: 99 % | HEART RATE: 63 BPM | SYSTOLIC BLOOD PRESSURE: 110 MMHG | BODY MASS INDEX: 51.91 KG/M2 | DIASTOLIC BLOOD PRESSURE: 60 MMHG | TEMPERATURE: 97.9 F | HEIGHT: 63 IN | RESPIRATION RATE: 16 BRPM

## 2019-01-30 DIAGNOSIS — E11.65 UNCONTROLLED TYPE 2 DIABETES MELLITUS WITH HYPERGLYCEMIA (HCC): ICD-10-CM

## 2019-01-30 DIAGNOSIS — I10 ESSENTIAL HYPERTENSION: ICD-10-CM

## 2019-01-30 DIAGNOSIS — J44.9 CHRONIC OBSTRUCTIVE PULMONARY DISEASE, UNSPECIFIED COPD TYPE (HCC): ICD-10-CM

## 2019-01-30 DIAGNOSIS — I50.30 (HFPEF) HEART FAILURE WITH PRESERVED EJECTION FRACTION (HCC): Primary | ICD-10-CM

## 2019-01-30 DIAGNOSIS — E66.01 MORBID OBESITY (HCC): ICD-10-CM

## 2019-01-30 DIAGNOSIS — Z79.01 CHRONIC ANTICOAGULATION: ICD-10-CM

## 2019-01-30 DIAGNOSIS — I89.0 LYMPHEDEMA OF BOTH LOWER EXTREMITIES: ICD-10-CM

## 2019-01-30 DIAGNOSIS — I48.0 PAF (PAROXYSMAL ATRIAL FIBRILLATION) (HCC): ICD-10-CM

## 2019-01-30 PROCEDURE — 2022F DILAT RTA XM EVC RTNOPTHY: CPT | Performed by: NURSE PRACTITIONER

## 2019-01-30 PROCEDURE — G8417 CALC BMI ABV UP PARAM F/U: HCPCS | Performed by: NURSE PRACTITIONER

## 2019-01-30 PROCEDURE — G8400 PT W/DXA NO RESULTS DOC: HCPCS | Performed by: NURSE PRACTITIONER

## 2019-01-30 PROCEDURE — 99213 OFFICE O/P EST LOW 20 MIN: CPT | Performed by: NURSE PRACTITIONER

## 2019-01-30 PROCEDURE — 3046F HEMOGLOBIN A1C LEVEL >9.0%: CPT | Performed by: NURSE PRACTITIONER

## 2019-01-30 PROCEDURE — G8926 SPIRO NO PERF OR DOC: HCPCS | Performed by: NURSE PRACTITIONER

## 2019-01-30 PROCEDURE — G8484 FLU IMMUNIZE NO ADMIN: HCPCS | Performed by: NURSE PRACTITIONER

## 2019-01-30 PROCEDURE — 3023F SPIROM DOC REV: CPT | Performed by: NURSE PRACTITIONER

## 2019-01-30 PROCEDURE — 1123F ACP DISCUSS/DSCN MKR DOCD: CPT | Performed by: NURSE PRACTITIONER

## 2019-01-30 PROCEDURE — 1101F PT FALLS ASSESS-DOCD LE1/YR: CPT | Performed by: NURSE PRACTITIONER

## 2019-01-30 PROCEDURE — 4040F PNEUMOC VAC/ADMIN/RCVD: CPT | Performed by: NURSE PRACTITIONER

## 2019-01-30 PROCEDURE — 1090F PRES/ABSN URINE INCON ASSESS: CPT | Performed by: NURSE PRACTITIONER

## 2019-01-30 PROCEDURE — 3017F COLORECTAL CA SCREEN DOC REV: CPT | Performed by: NURSE PRACTITIONER

## 2019-01-30 PROCEDURE — G8427 DOCREV CUR MEDS BY ELIG CLIN: HCPCS | Performed by: NURSE PRACTITIONER

## 2019-01-30 PROCEDURE — 1036F TOBACCO NON-USER: CPT | Performed by: NURSE PRACTITIONER

## 2019-01-30 RX ORDER — GABAPENTIN 100 MG/1
300 CAPSULE ORAL 3 TIMES DAILY
COMMUNITY
End: 2019-09-20 | Stop reason: SDUPTHER

## 2019-01-31 ENCOUNTER — TELEPHONE (OUTPATIENT)
Dept: CARDIOLOGY CLINIC | Age: 64
End: 2019-01-31

## 2019-02-01 ENCOUNTER — TELEPHONE (OUTPATIENT)
Dept: CARDIOLOGY CLINIC | Age: 64
End: 2019-02-01

## 2019-02-01 RX ORDER — METHYLPREDNISOLONE SODIUM SUCCINATE 125 MG/2ML
125 INJECTION, POWDER, LYOPHILIZED, FOR SOLUTION INTRAMUSCULAR; INTRAVENOUS ONCE
Status: CANCELLED | OUTPATIENT
Start: 2019-02-14 | End: 2019-02-01

## 2019-02-01 RX ORDER — SODIUM CHLORIDE 0.9 % (FLUSH) 0.9 %
5 SYRINGE (ML) INJECTION PRN
Status: CANCELLED | OUTPATIENT
Start: 2019-02-14

## 2019-02-01 RX ORDER — EPINEPHRINE 1 MG/ML
0.3 INJECTION, SOLUTION, CONCENTRATE INTRAVENOUS PRN
Status: CANCELLED | OUTPATIENT
Start: 2019-02-14

## 2019-02-01 RX ORDER — DIPHENHYDRAMINE HYDROCHLORIDE 50 MG/ML
50 INJECTION INTRAMUSCULAR; INTRAVENOUS ONCE
Status: CANCELLED | OUTPATIENT
Start: 2019-02-14 | End: 2019-02-01

## 2019-02-01 RX ORDER — SODIUM CHLORIDE 0.9 % (FLUSH) 0.9 %
10 SYRINGE (ML) INJECTION PRN
Status: CANCELLED | OUTPATIENT
Start: 2019-02-14

## 2019-02-01 RX ORDER — HEPARIN SODIUM (PORCINE) LOCK FLUSH IV SOLN 100 UNIT/ML 100 UNIT/ML
500 SOLUTION INTRAVENOUS PRN
Status: CANCELLED | OUTPATIENT
Start: 2019-02-14

## 2019-02-01 RX ORDER — 0.9 % SODIUM CHLORIDE 0.9 %
10 VIAL (ML) INJECTION ONCE
Status: CANCELLED | OUTPATIENT
Start: 2019-02-14 | End: 2019-02-01

## 2019-02-01 RX ORDER — SODIUM CHLORIDE 9 MG/ML
100 INJECTION, SOLUTION INTRAVENOUS CONTINUOUS
Status: CANCELLED | OUTPATIENT
Start: 2019-02-14

## 2019-02-01 RX ORDER — SODIUM CHLORIDE 9 MG/ML
INJECTION, SOLUTION INTRAVENOUS ONCE
Status: CANCELLED | OUTPATIENT
Start: 2019-02-14 | End: 2019-02-01

## 2019-02-14 ENCOUNTER — HOSPITAL ENCOUNTER (OUTPATIENT)
Dept: INFUSION THERAPY | Age: 64
Setting detail: INFUSION SERIES
Discharge: HOME | End: 2019-02-14
Payer: COMMERCIAL

## 2019-02-14 VITALS
RESPIRATION RATE: 18 BRPM | TEMPERATURE: 98.7 F | HEART RATE: 84 BPM | DIASTOLIC BLOOD PRESSURE: 64 MMHG | SYSTOLIC BLOOD PRESSURE: 124 MMHG

## 2019-02-14 DIAGNOSIS — D64.9 CHRONIC ANEMIA: ICD-10-CM

## 2019-02-14 PROCEDURE — 2580000003 HC RX 258: Performed by: NURSE PRACTITIONER

## 2019-02-14 PROCEDURE — 6360000002 HC RX W HCPCS: Performed by: NURSE PRACTITIONER

## 2019-02-14 PROCEDURE — 96365 THER/PROPH/DIAG IV INF INIT: CPT

## 2019-02-14 RX ORDER — SODIUM CHLORIDE 0.9 % (FLUSH) 0.9 %
10 SYRINGE (ML) INJECTION PRN
Status: CANCELLED | OUTPATIENT
Start: 2019-02-14

## 2019-02-14 RX ORDER — DIPHENHYDRAMINE HYDROCHLORIDE 50 MG/ML
50 INJECTION INTRAMUSCULAR; INTRAVENOUS ONCE
Status: CANCELLED | OUTPATIENT
Start: 2019-02-14 | End: 2019-02-14

## 2019-02-14 RX ORDER — SODIUM CHLORIDE 0.9 % (FLUSH) 0.9 %
10 SYRINGE (ML) INJECTION PRN
Status: DISCONTINUED | OUTPATIENT
Start: 2019-02-14 | End: 2019-02-15 | Stop reason: HOSPADM

## 2019-02-14 RX ORDER — SODIUM CHLORIDE 9 MG/ML
INJECTION, SOLUTION INTRAVENOUS ONCE
Status: DISCONTINUED | OUTPATIENT
Start: 2019-02-14 | End: 2019-02-15 | Stop reason: HOSPADM

## 2019-02-14 RX ORDER — SODIUM CHLORIDE 9 MG/ML
100 INJECTION, SOLUTION INTRAVENOUS CONTINUOUS
Status: CANCELLED | OUTPATIENT
Start: 2019-02-14

## 2019-02-14 RX ORDER — METHYLPREDNISOLONE SODIUM SUCCINATE 125 MG/2ML
125 INJECTION, POWDER, LYOPHILIZED, FOR SOLUTION INTRAMUSCULAR; INTRAVENOUS ONCE
Status: CANCELLED | OUTPATIENT
Start: 2019-02-14 | End: 2019-02-14

## 2019-02-14 RX ORDER — SODIUM CHLORIDE 9 MG/ML
INJECTION, SOLUTION INTRAVENOUS ONCE
Status: CANCELLED | OUTPATIENT
Start: 2019-02-14 | End: 2019-02-14

## 2019-02-14 RX ORDER — 0.9 % SODIUM CHLORIDE 0.9 %
10 VIAL (ML) INJECTION ONCE
Status: CANCELLED | OUTPATIENT
Start: 2019-02-14 | End: 2019-02-14

## 2019-02-14 RX ORDER — EPINEPHRINE 1 MG/ML
0.3 INJECTION, SOLUTION, CONCENTRATE INTRAVENOUS PRN
Status: CANCELLED | OUTPATIENT
Start: 2019-02-14

## 2019-02-14 RX ORDER — BUMETANIDE 1 MG/1
1 TABLET ORAL 2 TIMES DAILY
COMMUNITY
End: 2019-11-27

## 2019-02-14 RX ORDER — HEPARIN SODIUM (PORCINE) LOCK FLUSH IV SOLN 100 UNIT/ML 100 UNIT/ML
500 SOLUTION INTRAVENOUS PRN
Status: CANCELLED | OUTPATIENT
Start: 2019-02-14

## 2019-02-14 RX ORDER — SODIUM CHLORIDE 0.9 % (FLUSH) 0.9 %
5 SYRINGE (ML) INJECTION PRN
Status: CANCELLED | OUTPATIENT
Start: 2019-02-14

## 2019-02-14 RX ADMIN — FERUMOXYTOL 510 MG: 510 INJECTION INTRAVENOUS at 14:21

## 2019-02-14 RX ADMIN — Medication 10 ML: at 14:21

## 2019-02-14 ASSESSMENT — PAIN SCALES - GENERAL: PAINLEVEL_OUTOF10: 6

## 2019-02-14 ASSESSMENT — PAIN DESCRIPTION - LOCATION: LOCATION: LEG

## 2019-02-14 ASSESSMENT — PAIN DESCRIPTION - DESCRIPTORS: DESCRIPTORS: SHARP;THROBBING

## 2019-02-14 ASSESSMENT — PAIN DESCRIPTION - ORIENTATION: ORIENTATION: RIGHT;LEFT

## 2019-02-14 ASSESSMENT — PAIN DESCRIPTION - PROGRESSION: CLINICAL_PROGRESSION: NOT CHANGED

## 2019-02-14 ASSESSMENT — PAIN DESCRIPTION - ONSET: ONSET: ON-GOING

## 2019-02-14 ASSESSMENT — PAIN DESCRIPTION - PAIN TYPE: TYPE: CHRONIC PAIN

## 2019-02-14 ASSESSMENT — PAIN DESCRIPTION - FREQUENCY: FREQUENCY: OTHER (COMMENT)

## 2019-02-21 ENCOUNTER — HOSPITAL ENCOUNTER (OUTPATIENT)
Dept: INFUSION THERAPY | Age: 64
Setting detail: INFUSION SERIES
Discharge: HOME | End: 2019-02-21
Payer: COMMERCIAL

## 2019-02-21 VITALS
RESPIRATION RATE: 20 BRPM | HEART RATE: 78 BPM | SYSTOLIC BLOOD PRESSURE: 129 MMHG | TEMPERATURE: 97.6 F | OXYGEN SATURATION: 96 % | DIASTOLIC BLOOD PRESSURE: 53 MMHG

## 2019-02-21 DIAGNOSIS — D64.9 CHRONIC ANEMIA: ICD-10-CM

## 2019-02-21 PROCEDURE — 2580000003 HC RX 258: Performed by: NURSE PRACTITIONER

## 2019-02-21 PROCEDURE — 6360000002 HC RX W HCPCS: Performed by: NURSE PRACTITIONER

## 2019-02-21 PROCEDURE — 2580000003 HC RX 258

## 2019-02-21 PROCEDURE — 96365 THER/PROPH/DIAG IV INF INIT: CPT

## 2019-02-21 RX ORDER — SODIUM CHLORIDE 0.9 % (FLUSH) 0.9 %
5 SYRINGE (ML) INJECTION PRN
Status: CANCELLED | OUTPATIENT
Start: 2019-02-21

## 2019-02-21 RX ORDER — SODIUM CHLORIDE 9 MG/ML
INJECTION, SOLUTION INTRAVENOUS ONCE
Status: CANCELLED | OUTPATIENT
Start: 2019-02-21 | End: 2019-02-21

## 2019-02-21 RX ORDER — METHYLPREDNISOLONE SODIUM SUCCINATE 125 MG/2ML
125 INJECTION, POWDER, LYOPHILIZED, FOR SOLUTION INTRAMUSCULAR; INTRAVENOUS ONCE
Status: CANCELLED | OUTPATIENT
Start: 2019-02-21 | End: 2019-02-21

## 2019-02-21 RX ORDER — DIPHENHYDRAMINE HYDROCHLORIDE 50 MG/ML
50 INJECTION INTRAMUSCULAR; INTRAVENOUS ONCE
Status: CANCELLED | OUTPATIENT
Start: 2019-02-21 | End: 2019-02-21

## 2019-02-21 RX ORDER — SODIUM CHLORIDE 0.9 % (FLUSH) 0.9 %
10 SYRINGE (ML) INJECTION PRN
Status: CANCELLED | OUTPATIENT
Start: 2019-02-21

## 2019-02-21 RX ORDER — EPINEPHRINE 1 MG/ML
0.3 INJECTION, SOLUTION, CONCENTRATE INTRAVENOUS PRN
Status: CANCELLED | OUTPATIENT
Start: 2019-02-21

## 2019-02-21 RX ORDER — SODIUM CHLORIDE 0.9 % (FLUSH) 0.9 %
10 SYRINGE (ML) INJECTION PRN
Status: DISCONTINUED | OUTPATIENT
Start: 2019-02-21 | End: 2019-02-22 | Stop reason: HOSPADM

## 2019-02-21 RX ORDER — SODIUM CHLORIDE 9 MG/ML
INJECTION, SOLUTION INTRAVENOUS ONCE
Status: COMPLETED | OUTPATIENT
Start: 2019-02-21 | End: 2019-02-21

## 2019-02-21 RX ORDER — SODIUM CHLORIDE 0.9 % (FLUSH) 0.9 %
SYRINGE (ML) INJECTION
Status: COMPLETED
Start: 2019-02-21 | End: 2019-02-21

## 2019-02-21 RX ORDER — HEPARIN SODIUM (PORCINE) LOCK FLUSH IV SOLN 100 UNIT/ML 100 UNIT/ML
500 SOLUTION INTRAVENOUS PRN
Status: CANCELLED | OUTPATIENT
Start: 2019-02-21

## 2019-02-21 RX ORDER — SODIUM CHLORIDE 9 MG/ML
100 INJECTION, SOLUTION INTRAVENOUS CONTINUOUS
Status: CANCELLED | OUTPATIENT
Start: 2019-02-21

## 2019-02-21 RX ORDER — 0.9 % SODIUM CHLORIDE 0.9 %
10 VIAL (ML) INJECTION ONCE
Status: CANCELLED | OUTPATIENT
Start: 2019-02-21 | End: 2019-02-21

## 2019-02-21 RX ADMIN — Medication 10 ML: at 13:59

## 2019-02-21 RX ADMIN — FERUMOXYTOL 510 MG: 510 INJECTION INTRAVENOUS at 13:55

## 2019-02-21 RX ADMIN — SODIUM CHLORIDE: 9 INJECTION, SOLUTION INTRAVENOUS at 14:17

## 2019-02-21 ASSESSMENT — PAIN DESCRIPTION - ONSET: ONSET: ON-GOING

## 2019-02-21 ASSESSMENT — PAIN DESCRIPTION - DESCRIPTORS: DESCRIPTORS: ACHING;SHARP

## 2019-02-21 ASSESSMENT — PAIN DESCRIPTION - LOCATION: LOCATION: FOOT

## 2019-02-21 ASSESSMENT — PAIN SCALES - GENERAL: PAINLEVEL_OUTOF10: 6

## 2019-02-21 ASSESSMENT — PAIN DESCRIPTION - FREQUENCY: FREQUENCY: CONTINUOUS

## 2019-02-21 ASSESSMENT — PAIN DESCRIPTION - ORIENTATION: ORIENTATION: RIGHT;LEFT

## 2019-02-21 ASSESSMENT — PAIN DESCRIPTION - PAIN TYPE: TYPE: CHRONIC PAIN

## 2019-02-26 DIAGNOSIS — Z01.818 PRE-OP TESTING: ICD-10-CM

## 2019-02-26 DIAGNOSIS — I50.32 CHRONIC DIASTOLIC HEART FAILURE (HCC): Primary | Chronic | ICD-10-CM

## 2019-02-27 ENCOUNTER — TELEPHONE (OUTPATIENT)
Dept: CARDIOLOGY CLINIC | Age: 64
End: 2019-02-27

## 2019-02-27 DIAGNOSIS — Z01.818 PRE-OP TESTING: ICD-10-CM

## 2019-02-27 DIAGNOSIS — I50.32 CHRONIC DIASTOLIC HEART FAILURE (HCC): Chronic | ICD-10-CM

## 2019-02-27 LAB
BASOPHILS ABSOLUTE: NORMAL /ΜL
BASOPHILS RELATIVE PERCENT: NORMAL %
EOSINOPHILS ABSOLUTE: NORMAL /ΜL
EOSINOPHILS RELATIVE PERCENT: NORMAL %
HCT VFR BLD CALC: NORMAL % (ref 36–46)
HEMOGLOBIN: NORMAL G/DL (ref 12–16)
INR BLD: NORMAL
LYMPHOCYTES ABSOLUTE: NORMAL /ΜL
LYMPHOCYTES RELATIVE PERCENT: NORMAL %
MCH RBC QN AUTO: NORMAL PG
MCHC RBC AUTO-ENTMCNC: NORMAL G/DL
MCV RBC AUTO: NORMAL FL
MONOCYTES ABSOLUTE: NORMAL /ΜL
MONOCYTES RELATIVE PERCENT: NORMAL %
NEUTROPHILS ABSOLUTE: NORMAL /ΜL
NEUTROPHILS RELATIVE PERCENT: NORMAL %
PLATELET # BLD: NORMAL K/ΜL
PMV BLD AUTO: NORMAL FL
PROTIME: NORMAL SECONDS
RBC # BLD: NORMAL 10^6/ΜL
WBC # BLD: NORMAL 10^3/ML

## 2019-02-28 ENCOUNTER — HOSPITAL ENCOUNTER (OUTPATIENT)
Dept: CARDIAC CATH/INVASIVE PROCEDURES | Age: 64
Setting detail: OUTPATIENT SURGERY
Discharge: HOME | End: 2019-02-28
Payer: COMMERCIAL

## 2019-03-21 ENCOUNTER — APPOINTMENT (OUTPATIENT)
Dept: GENERAL RADIOLOGY | Age: 64
End: 2019-03-21
Payer: COMMERCIAL

## 2019-03-21 ENCOUNTER — HOSPITAL ENCOUNTER (EMERGENCY)
Age: 64
Discharge: HOME | End: 2019-03-21
Attending: EMERGENCY MEDICINE
Payer: COMMERCIAL

## 2019-03-21 VITALS
RESPIRATION RATE: 14 BRPM | OXYGEN SATURATION: 94 % | DIASTOLIC BLOOD PRESSURE: 60 MMHG | TEMPERATURE: 98.8 F | SYSTOLIC BLOOD PRESSURE: 119 MMHG | HEART RATE: 97 BPM

## 2019-03-21 DIAGNOSIS — J44.1 COPD EXACERBATION (HCC): ICD-10-CM

## 2019-03-21 DIAGNOSIS — I50.9 CHRONIC CONGESTIVE HEART FAILURE, UNSPECIFIED HEART FAILURE TYPE (HCC): ICD-10-CM

## 2019-03-21 DIAGNOSIS — J10.1 INFLUENZA A: Primary | ICD-10-CM

## 2019-03-21 DIAGNOSIS — J18.9 PNEUMONIA DUE TO ORGANISM: ICD-10-CM

## 2019-03-21 LAB
ANION GAP SERPL CALCULATED.3IONS-SCNC: 9 MMOL/L (ref 7–16)
BASOPHILS ABSOLUTE: 0.02 E9/L (ref 0–0.2)
BASOPHILS RELATIVE PERCENT: 0.4 % (ref 0–2)
BUN BLDV-MCNC: 30 MG/DL (ref 8–23)
CALCIUM SERPL-MCNC: 9.3 MG/DL (ref 8.6–10.2)
CHLORIDE BLD-SCNC: 95 MMOL/L (ref 98–107)
CO2: 34 MMOL/L (ref 22–29)
CREAT SERPL-MCNC: 1.1 MG/DL (ref 0.5–1)
EOSINOPHILS ABSOLUTE: 0.13 E9/L (ref 0.05–0.5)
EOSINOPHILS RELATIVE PERCENT: 2.8 % (ref 0–6)
GFR AFRICAN AMERICAN: >60
GFR NON-AFRICAN AMERICAN: 50 ML/MIN/1.73
GLUCOSE BLD-MCNC: 218 MG/DL (ref 74–99)
HCT VFR BLD CALC: 29 % (ref 34–48)
HEMOGLOBIN: 9 G/DL (ref 11.5–15.5)
IMMATURE GRANULOCYTES #: 0.03 E9/L
IMMATURE GRANULOCYTES %: 0.6 % (ref 0–5)
INFLUENZA A BY PCR: DETECTED
INFLUENZA B BY PCR: NOT DETECTED
LYMPHOCYTES ABSOLUTE: 0.74 E9/L (ref 1.5–4)
LYMPHOCYTES RELATIVE PERCENT: 15.9 % (ref 20–42)
MCH RBC QN AUTO: 26.5 PG (ref 26–35)
MCHC RBC AUTO-ENTMCNC: 31 % (ref 32–34.5)
MCV RBC AUTO: 85.3 FL (ref 80–99.9)
MONOCYTES ABSOLUTE: 0.52 E9/L (ref 0.1–0.95)
MONOCYTES RELATIVE PERCENT: 11.2 % (ref 2–12)
NEUTROPHILS ABSOLUTE: 3.21 E9/L (ref 1.8–7.3)
NEUTROPHILS RELATIVE PERCENT: 69.1 % (ref 43–80)
PDW BLD-RTO: 16 FL (ref 11.5–15)
PLATELET # BLD: 187 E9/L (ref 130–450)
PMV BLD AUTO: 9.8 FL (ref 7–12)
POTASSIUM REFLEX MAGNESIUM: 4.4 MMOL/L (ref 3.5–5)
PRO-BNP: 403 PG/ML (ref 0–125)
RBC # BLD: 3.4 E12/L (ref 3.5–5.5)
SODIUM BLD-SCNC: 138 MMOL/L (ref 132–146)
TROPONIN: <0.01 NG/ML (ref 0–0.03)
WBC # BLD: 4.7 E9/L (ref 4.5–11.5)

## 2019-03-21 PROCEDURE — 6370000000 HC RX 637 (ALT 250 FOR IP): Performed by: EMERGENCY MEDICINE

## 2019-03-21 PROCEDURE — 96374 THER/PROPH/DIAG INJ IV PUSH: CPT

## 2019-03-21 PROCEDURE — 84484 ASSAY OF TROPONIN QUANT: CPT

## 2019-03-21 PROCEDURE — 71046 X-RAY EXAM CHEST 2 VIEWS: CPT

## 2019-03-21 PROCEDURE — 83880 ASSAY OF NATRIURETIC PEPTIDE: CPT

## 2019-03-21 PROCEDURE — 85025 COMPLETE CBC W/AUTO DIFF WBC: CPT

## 2019-03-21 PROCEDURE — 87502 INFLUENZA DNA AMP PROBE: CPT

## 2019-03-21 PROCEDURE — 94640 AIRWAY INHALATION TREATMENT: CPT

## 2019-03-21 PROCEDURE — 80048 BASIC METABOLIC PNL TOTAL CA: CPT

## 2019-03-21 PROCEDURE — 99285 EMERGENCY DEPT VISIT HI MDM: CPT

## 2019-03-21 RX ORDER — FUROSEMIDE 10 MG/ML
40 INJECTION INTRAMUSCULAR; INTRAVENOUS ONCE
Status: DISCONTINUED | OUTPATIENT
Start: 2019-03-21 | End: 2019-03-21 | Stop reason: HOSPADM

## 2019-03-21 RX ORDER — DOXYCYCLINE HYCLATE 100 MG
100 TABLET ORAL 2 TIMES DAILY
Qty: 20 TABLET | Refills: 0 | Status: SHIPPED | OUTPATIENT
Start: 2019-03-21 | End: 2019-03-31

## 2019-03-21 RX ORDER — PREDNISONE 20 MG/1
60 TABLET ORAL ONCE
Status: COMPLETED | OUTPATIENT
Start: 2019-03-21 | End: 2019-03-21

## 2019-03-21 RX ORDER — IPRATROPIUM BROMIDE AND ALBUTEROL SULFATE 2.5; .5 MG/3ML; MG/3ML
1 SOLUTION RESPIRATORY (INHALATION) ONCE
Status: COMPLETED | OUTPATIENT
Start: 2019-03-21 | End: 2019-03-21

## 2019-03-21 RX ORDER — CEFDINIR 300 MG/1
300 CAPSULE ORAL 2 TIMES DAILY
Qty: 20 CAPSULE | Refills: 0 | Status: SHIPPED | OUTPATIENT
Start: 2019-03-21 | End: 2019-03-31

## 2019-03-21 RX ORDER — OSELTAMIVIR PHOSPHATE 75 MG/1
75 CAPSULE ORAL 2 TIMES DAILY
Qty: 9 CAPSULE | Refills: 0 | Status: SHIPPED | OUTPATIENT
Start: 2019-03-21 | End: 2019-03-26

## 2019-03-21 RX ORDER — DOXYCYCLINE HYCLATE 100 MG/1
100 CAPSULE ORAL ONCE
Status: COMPLETED | OUTPATIENT
Start: 2019-03-21 | End: 2019-03-21

## 2019-03-21 RX ORDER — OSELTAMIVIR PHOSPHATE 75 MG/1
75 CAPSULE ORAL ONCE
Status: COMPLETED | OUTPATIENT
Start: 2019-03-21 | End: 2019-03-21

## 2019-03-21 RX ORDER — CEFDINIR 300 MG/1
300 CAPSULE ORAL ONCE
Status: COMPLETED | OUTPATIENT
Start: 2019-03-21 | End: 2019-03-21

## 2019-03-21 RX ORDER — PREDNISONE 10 MG/1
50 TABLET ORAL DAILY
Qty: 20 TABLET | Refills: 0 | Status: SHIPPED | OUTPATIENT
Start: 2019-03-21 | End: 2019-03-25

## 2019-03-21 RX ORDER — LANSOPRAZOLE 30 MG/1
30 CAPSULE, DELAYED RELEASE ORAL DAILY
COMMUNITY
End: 2019-06-18 | Stop reason: DRUGHIGH

## 2019-03-21 RX ADMIN — OSELTAMIVIR PHOSPHATE 75 MG: 75 CAPSULE ORAL at 09:17

## 2019-03-21 RX ADMIN — IPRATROPIUM BROMIDE AND ALBUTEROL SULFATE 1 AMPULE: .5; 3 SOLUTION RESPIRATORY (INHALATION) at 07:23

## 2019-03-21 RX ADMIN — DOXYCYCLINE HYCLATE 100 MG: 100 CAPSULE ORAL at 09:17

## 2019-03-21 RX ADMIN — CEFDINIR 300 MG: 300 CAPSULE ORAL at 09:17

## 2019-03-21 RX ADMIN — PREDNISONE 60 MG: 20 TABLET ORAL at 08:07

## 2019-03-29 LAB
EKG ATRIAL RATE: 90 BPM
EKG P AXIS: 63 DEGREES
EKG P-R INTERVAL: 138 MS
EKG Q-T INTERVAL: 384 MS
EKG QRS DURATION: 112 MS
EKG QTC CALCULATION (BAZETT): 469 MS
EKG R AXIS: 12 DEGREES
EKG T AXIS: 53 DEGREES
EKG VENTRICULAR RATE: 90 BPM

## 2019-04-05 ENCOUNTER — OFFICE VISIT (OUTPATIENT)
Dept: CARDIOLOGY CLINIC | Age: 64
End: 2019-04-05
Payer: COMMERCIAL

## 2019-04-05 VITALS — SYSTOLIC BLOOD PRESSURE: 134 MMHG | HEART RATE: 70 BPM | DIASTOLIC BLOOD PRESSURE: 70 MMHG | RESPIRATION RATE: 16 BRPM

## 2019-04-05 DIAGNOSIS — I10 ESSENTIAL HYPERTENSION: ICD-10-CM

## 2019-04-05 DIAGNOSIS — I48.0 PAF (PAROXYSMAL ATRIAL FIBRILLATION) (HCC): Primary | Chronic | ICD-10-CM

## 2019-04-05 PROCEDURE — 93000 ELECTROCARDIOGRAM COMPLETE: CPT | Performed by: INTERNAL MEDICINE

## 2019-04-05 PROCEDURE — 1123F ACP DISCUSS/DSCN MKR DOCD: CPT | Performed by: INTERNAL MEDICINE

## 2019-04-05 PROCEDURE — G8417 CALC BMI ABV UP PARAM F/U: HCPCS | Performed by: INTERNAL MEDICINE

## 2019-04-05 PROCEDURE — 4040F PNEUMOC VAC/ADMIN/RCVD: CPT | Performed by: INTERNAL MEDICINE

## 2019-04-05 PROCEDURE — G8427 DOCREV CUR MEDS BY ELIG CLIN: HCPCS | Performed by: INTERNAL MEDICINE

## 2019-04-05 PROCEDURE — 1036F TOBACCO NON-USER: CPT | Performed by: INTERNAL MEDICINE

## 2019-04-05 PROCEDURE — 99214 OFFICE O/P EST MOD 30 MIN: CPT | Performed by: INTERNAL MEDICINE

## 2019-04-05 PROCEDURE — 3017F COLORECTAL CA SCREEN DOC REV: CPT | Performed by: INTERNAL MEDICINE

## 2019-04-05 PROCEDURE — G8400 PT W/DXA NO RESULTS DOC: HCPCS | Performed by: INTERNAL MEDICINE

## 2019-04-05 PROCEDURE — 1090F PRES/ABSN URINE INCON ASSESS: CPT | Performed by: INTERNAL MEDICINE

## 2019-04-05 RX ORDER — CEFDINIR 300 MG/1
300 CAPSULE ORAL 2 TIMES DAILY
COMMUNITY
End: 2019-06-18

## 2019-04-05 RX ORDER — DOXYCYCLINE HYCLATE 100 MG/1
100 CAPSULE ORAL 2 TIMES DAILY
COMMUNITY
End: 2019-06-18

## 2019-04-05 RX ORDER — PREDNISONE 10 MG/1
10 TABLET ORAL DAILY
COMMUNITY
End: 2019-06-18

## 2019-04-05 RX ORDER — METOPROLOL SUCCINATE 100 MG/1
100 TABLET, EXTENDED RELEASE ORAL 2 TIMES DAILY
Qty: 180 TABLET | Refills: 3 | Status: SHIPPED | OUTPATIENT
Start: 2019-04-05 | End: 2019-09-20 | Stop reason: SDUPTHER

## 2019-04-05 RX ORDER — OSELTAMIVIR PHOSPHATE 75 MG/1
75 CAPSULE ORAL 2 TIMES DAILY
COMMUNITY
End: 2019-06-18

## 2019-04-06 NOTE — PROGRESS NOTES
Elisabet Snider  1955  Date of Service: 4/6/2019    Patient Active Problem List    Diagnosis Date Noted    Acute on chronic anemia 12/16/2018     Priority: High    Palpitations 11/06/2018     Priority: High    Occult blood in stools 11/06/2018     Priority: High    Anxiety and depression 11/06/2018     Priority: Low    Chronic anticoagulation 11/06/2018     Priority: Low    Acute on chronic diastolic heart failure (HCC)     Acute hypoxemic respiratory failure (HCC)     Morbid obesity (HCC)     Acute diastolic congestive heart failure ([address] 12/15/2018    Septicemia ([address]     Cellulitis of left lower extremity     Uncontrolled type 2 diabetes mellitus with hyperglycemia ([address]     Lymphedema     Sepsis ([address] 11/27/2018    Blood loss anemia     PAF (paroxysmal atrial fibrillation) ([address] - currently in SR 11/06/2018     Overview Note:     May 2004, spontaneous conversion to sinus rhythm and sinus tachycardia on Cardizem. Not on coumadin d/t prior, bleeding, GIB, & anemia.       GI bleed 11/06/2018    COPD (chronic obstructive pulmonary disease) ([address] 11/06/2018    Acute on chronic respiratory failure with hypoxia (HCC) 11/06/2018    Chronic deep vein thrombosis (DVT) of distal vein of right lower extremity ([address] 08/01/2018    Dyspnea on exertion     Lymphedema of both lower extremities 04/13/2018    Chronic anemia 04/13/2018    Physical deconditioning 01/19/2018    Chronic diastolic heart failure (HCC) 01/15/2018    PFO (patent foramen ovale) 08/12/2015    Positive hepatitis C antibody test 01/21/2015    Chronic anal fissure 06/14/2013    LVH (left ventricular hypertrophy)      Overview Note:     moderate      Iron deficiency 03/15/2013    Diabetic neuropathy ([address] 02/22/2013    Baker's cyst of knee 02/13/2013    Steatohepatitis 11/13/2012    Chronic pain 07/31/2012    Chronic sinusitis 11/02/2011    Pulmonary hypertension 10/13/2011     Overview Note:     Mild pulmonary hypertension with a pulmonary artery systolic pressure of 32-96 mmHg on echo 04. No cardiac etiology seen.  Hyperlipidemia 2011    Osteoarthritis 2011    Hypertension 04/15/2011    Depression 2011    Type 2 diabetes mellitus with diabetic neuropathy, with long-term current use of insulin ([facility] 2010       Social History     Socioeconomic History    Marital status: Single     Spouse name: None    Number of children: 3    Years of education: 9    Highest education level: None   Occupational History    Occupation: SSD   Social Needs    Financial resource strain: None    Food insecurity:     Worry: None     Inability: None    Transportation needs:     Medical: None     Non-medical: None   Tobacco Use    Smoking status: Former Smoker     Packs/day: 1.50     Years: 25.00     Pack years: 37.50     Types: Cigarettes     Start date: 1979     Last attempt to quit: 2004     Years since quittin.3    Smokeless tobacco: Never Used   Substance and Sexual Activity    Alcohol use: No     Comment: caffeine: no use    Drug use: No    Sexual activity: Never   Lifestyle    Physical activity:     Days per week: None     Minutes per session: None    Stress: None   Relationships    Social connections:     Talks on phone: None     Gets together: None     Attends Muslim service: None     Active member of club or organization: None     Attends meetings of clubs or organizations: None     Relationship status: None    Intimate partner violence:     Fear of current or ex partner: None     Emotionally abused: None     Physically abused: None     Forced sexual activity: None   Other Topics Concern    None   Social History Narrative    Denies caffeine.         Current Outpatient Medications   Medication Sig Dispense Refill    predniSONE (DELTASONE) 10 MG tablet Take 10 mg by mouth daily      doxycycline hyclate (VIBRAMYCIN) 100 MG capsule Take 100 mg by mouth 2 times daily      cefdinir (OMNICEF) 300 MG capsule Take 300 mg by mouth 2 times daily      oseltamivir (TAMIFLU) 75 MG capsule Take 75 mg by mouth 2 times daily      Melatonin 5 MG CAPS Take by mouth nightly      metoprolol succinate (TOPROL XL) 100 MG extended release tablet Take 1 tablet by mouth 2 times daily 180 tablet 3    lansoprazole (PREVACID) 30 MG delayed release capsule Take 30 mg by mouth daily      bumetanide (BUMEX) 1 MG tablet Take 1 mg by mouth 2 times daily      gabapentin (NEURONTIN) 100 MG capsule Take 300 mg by mouth 3 times daily.  oxyCODONE-acetaminophen (PERCOCET)  MG per tablet Take 1 tablet by mouth every 6 hours as needed for Pain. [place] acetaminophen (TYLENOL) 325 MG tablet Take 650 mg by mouth every 6 hours as needed for Pain      glucose (GLUTOSE) 40 % GEL Take 37.5 mLs by mouth as needed (hypoglycemia) 45 g 0    spironolactone (ALDACTONE) 25 MG tablet Take 1 tablet by mouth daily 30 tablet 0    docusate sodium (COLACE, DULCOLAX) 100 MG CAPS Take 100 mg by mouth 3 times daily as needed for Constipation 10 capsule 0    insulin glargine (LANTUS) 100 UNIT/ML injection vial Inject 100 Units into the skin nightly 1 vial 3    insulin lispro (HUMALOG) 100 UNIT/ML injection vial Inject 90 Units into the skin 3 times daily (before meals) (Patient taking differently: Inject 60 Units into the skin 3 times daily (before meals) )      terbinafine (LAMISIL) 1 % cream Apply topically 2 times daily.  3 Tube 1    diltiazem (CARDIZEM CD) 240 MG extended release capsule TAKE 1 CAPSULE BY MOUTH 2 TIMES DAILY (Patient taking differently: TAKE 1 CAPSULE BY MOUTH 2 TIMES DAILY) 180 capsule 1    albuterol sulfate HFA (VENTOLIN HFA) 108 (90 Base) MCG/ACT inhaler INHALE 2 PUFFS INTO LUNGS EVERY 4 HOURS AS NEEDED FOR WHEEZING OR SHORTNESS OF BREATH 18 Inhaler 5    fexofenadine (ALLEGRA) 180 MG tablet TAKE 1 TABLET BY MOUTH EVERY DAY 30 tablet 2    traZODone (DESYREL) 50 MG tablet Take 1 tablet by mouth nightly 30 tablet 2    apixaban (ELIQUIS) 5 MG TABS tablet Take 1 tablet by mouth 2 times daily 60 tablet 2    nystatin (MYCOSTATIN) 340150 UNIT/GM cream Apply topically 2 times daily. 60 g 2    escitalopram (LEXAPRO) 20 MG tablet TAKE 1 TABLET BY MOUTH DAILY 90 tablet 1    ferrous sulfate (NAOMY-ALEC) 325 (65 Fe) MG tablet Take 1 tablet by mouth daily (with breakfast) 30 tablet 3    fluticasone (FLONASE) 50 MCG/ACT nasal spray USE 2 SPRAYS BY NASAL ROUTE DAILY (Patient taking differently: USE 2 SPRAYS BY NASAL ROUTE QHS) 1 Bottle 5    OXYGEN Inhale 4 L into the lungs continuous       fenofibrate (TRICOR) 145 MG tablet TAKE 1 TABLET BY MOUTH DAILY 30 tablet 5    Elastic Bandages & Supports MISC 20-30 mmHg bilateral compression stockings  Size to fit  Dx:  Edema  Knee high 2 each 0    DULERA 200-5 MCG/ACT inhaler INHALE 2 PUFFS INTO LUNGS TWICE A DAY RINSE MOUTH AFTER USE 3 Inhaler 1    ipratropium-albuterol (DUONEB) 0.5-2.5 (3) MG/3ML SOLN nebulizer solution Inhale 3 mLs into the lungs every 4 hours. (Patient taking differently: Inhale 1 vial into the lungs every 4 hours as needed for Shortness of Breath ) 360 mL 5    ALPRAZolam (XANAX) 0.25 MG tablet Take 0.25 mg by mouth nightly as needed for Sleep. .       No current facility-administered medications for this visit. Allergies   Allergen Reactions    Statins Other (See Comments)     Muscle pains       Chief Complaint:  Jennifer Moore is here today for follow up and management/recomendations for LVH, PAF, inappropriate sinus tach, HTN. History of Present Illness: Jennifer Moore states that She recently recovered from the flu a few weeks ago. She uses a walker and has chronic dyspnea on exertion from her severe oxygen requiring COPD. She feels that she is at her normal baseline. She denies any chest discomfort, orthopnea/PND. She has chronic severe lower extremity lymphedema that is not changed.   She denies any presyncopal symptoms. REVIEW OF SYSTEMS:  As above. Patient does not complain of any fever, chills, nausea, vomiting or diarrhea. No focal, motor or neurological deficits. No changes in his/her vision, hearing, bowel or bladder habits. She is not known to have a history of thyroid problems. No recent nose bleeds. PHYSICAL EXAM:  Vitals:    04/05/19 0914   BP: 134/70   Pulse: 70   Resp: 16       GENERAL:  She is alert and oriented x 3, communicates well, in no distress. NECK:  Thick, short, difficult to examine. No masses, trachea is mid position. Supple, full ROM, no JVD or bruits. No palpable thyromegaly or lymphadenopathy. HEART:  Regular rate and rhythm. Normal S1 and S2. There is an S4 gallop and a I/VI systolic ejection murmur. LUNGS:  Poor air movement. No use of accessory muscles. symmetrical excursion. ABDOMEN:  Soft, non-tender. Normal bowel sounds. Morbidly obese  EXTREMITIES:  Full ROM x 4. Wrapped but Severe bilateral lower extremity edema. Good distal pulses. EYES:  Extraocular muscles intact. PERRL. Normal lids & conjunctiva. ENT:  Nares are clear & not bleeding. Moist mucosa. Normal lips formation. No external masses   NEURO: no tremors, full ROM x 4, EOMI. SKIN:  Warm, dry and intact. Normal turgor. EKG: Sinus rhythm, 70 bpm, nl axis, nonspecific ST - T wave changes. IRBBB      Assessment:   1. Paroxysmal atrial fibrillation. maintaining sinus rhythm today. Not on coumadin d/t prior GIB. 2. Chronic anemia. Most recent hemoglobin is 9.  3. PFO  4. Diastolic dysfunction  5. Hypervolemia and chronic severe lymphedema  6. Inappropriate sinus tachycardia. 7. Hypertension, well controled at this time. 8. Morbidly obese  9. Chronic lung disease. On chronic oxygen therapy. 10. Pulmonary hypertension        Recommendations:  1. She is following the cholesterol with Dr. Alexis Gross. 2. Increase the Bumex to 3 times a day for 3 days.   3. Recommend keeping hemoglobin 9 or greater. I'll defer this to others. 4. I'll change her Lopressor to Toprol 100 mg twice a day. She does notice her heart rate increases when she has dyspnea with exertion      Thank you for allowing me to participate in your patient's care.       [address], [facility]  Interventional Cardiology

## 2019-06-18 ENCOUNTER — CARE COORDINATION (OUTPATIENT)
Dept: CARE COORDINATION | Age: 64
End: 2019-06-18

## 2019-06-18 ENCOUNTER — OFFICE VISIT (OUTPATIENT)
Dept: FAMILY MEDICINE CLINIC | Age: 64
End: 2019-06-18
Payer: COMMERCIAL

## 2019-06-18 VITALS
HEART RATE: 72 BPM | HEIGHT: 63 IN | BODY MASS INDEX: 51.91 KG/M2 | SYSTOLIC BLOOD PRESSURE: 115 MMHG | WEIGHT: 293 LBS | DIASTOLIC BLOOD PRESSURE: 61 MMHG | RESPIRATION RATE: 14 BRPM

## 2019-06-18 DIAGNOSIS — Z79.4 TYPE 2 DIABETES MELLITUS WITHOUT COMPLICATION, WITH LONG-TERM CURRENT USE OF INSULIN (HCC): Primary | ICD-10-CM

## 2019-06-18 DIAGNOSIS — I48.0 PAF (PAROXYSMAL ATRIAL FIBRILLATION) (HCC): ICD-10-CM

## 2019-06-18 DIAGNOSIS — I50.32 CHRONIC DIASTOLIC CONGESTIVE HEART FAILURE (HCC): ICD-10-CM

## 2019-06-18 DIAGNOSIS — D50.9 IRON DEFICIENCY ANEMIA, UNSPECIFIED IRON DEFICIENCY ANEMIA TYPE: ICD-10-CM

## 2019-06-18 DIAGNOSIS — I10 ESSENTIAL HYPERTENSION: ICD-10-CM

## 2019-06-18 DIAGNOSIS — E11.42 DIABETIC POLYNEUROPATHY ASSOCIATED WITH TYPE 2 DIABETES MELLITUS (HCC): ICD-10-CM

## 2019-06-18 DIAGNOSIS — I50.32 CHRONIC DIASTOLIC HEART FAILURE (HCC): ICD-10-CM

## 2019-06-18 DIAGNOSIS — G89.4 CHRONIC PAIN SYNDROME: ICD-10-CM

## 2019-06-18 DIAGNOSIS — I89.0 LYMPHEDEMA OF BOTH LOWER EXTREMITIES: ICD-10-CM

## 2019-06-18 DIAGNOSIS — J44.9 CHRONIC OBSTRUCTIVE PULMONARY DISEASE, UNSPECIFIED COPD TYPE (HCC): ICD-10-CM

## 2019-06-18 DIAGNOSIS — E11.9 TYPE 2 DIABETES MELLITUS WITHOUT COMPLICATION, WITH LONG-TERM CURRENT USE OF INSULIN (HCC): Primary | ICD-10-CM

## 2019-06-18 LAB — HBA1C MFR BLD: 9.2 %

## 2019-06-18 PROCEDURE — G8427 DOCREV CUR MEDS BY ELIG CLIN: HCPCS | Performed by: FAMILY MEDICINE

## 2019-06-18 PROCEDURE — 4040F PNEUMOC VAC/ADMIN/RCVD: CPT | Performed by: FAMILY MEDICINE

## 2019-06-18 PROCEDURE — 2022F DILAT RTA XM EVC RTNOPTHY: CPT | Performed by: FAMILY MEDICINE

## 2019-06-18 PROCEDURE — 1123F ACP DISCUSS/DSCN MKR DOCD: CPT | Performed by: FAMILY MEDICINE

## 2019-06-18 PROCEDURE — G8400 PT W/DXA NO RESULTS DOC: HCPCS | Performed by: FAMILY MEDICINE

## 2019-06-18 PROCEDURE — 3046F HEMOGLOBIN A1C LEVEL >9.0%: CPT | Performed by: FAMILY MEDICINE

## 2019-06-18 PROCEDURE — 1090F PRES/ABSN URINE INCON ASSESS: CPT | Performed by: FAMILY MEDICINE

## 2019-06-18 PROCEDURE — 3023F SPIROM DOC REV: CPT | Performed by: FAMILY MEDICINE

## 2019-06-18 PROCEDURE — 1036F TOBACCO NON-USER: CPT | Performed by: FAMILY MEDICINE

## 2019-06-18 PROCEDURE — G8417 CALC BMI ABV UP PARAM F/U: HCPCS | Performed by: FAMILY MEDICINE

## 2019-06-18 PROCEDURE — 83036 HEMOGLOBIN GLYCOSYLATED A1C: CPT | Performed by: FAMILY MEDICINE

## 2019-06-18 PROCEDURE — 99215 OFFICE O/P EST HI 40 MIN: CPT | Performed by: FAMILY MEDICINE

## 2019-06-18 PROCEDURE — 3017F COLORECTAL CA SCREEN DOC REV: CPT | Performed by: FAMILY MEDICINE

## 2019-06-18 PROCEDURE — G8926 SPIRO NO PERF OR DOC: HCPCS | Performed by: FAMILY MEDICINE

## 2019-06-18 RX ORDER — FEXOFENADINE HCL 180 MG/1
180 TABLET ORAL DAILY PRN
Qty: 30 TABLET | Refills: 2 | Status: SHIPPED | OUTPATIENT
Start: 2019-06-18 | End: 2019-11-07

## 2019-06-18 RX ORDER — LANSOPRAZOLE 30 MG/1
30 CAPSULE, DELAYED RELEASE ORAL DAILY PRN
Status: SHIPPED | COMMUNITY
Start: 2019-06-18 | End: 2019-11-22

## 2019-06-18 ASSESSMENT — ENCOUNTER SYMPTOMS
VOMITING: 0
COLOR CHANGE: 0
SORE THROAT: 0
BLOOD IN STOOL: 0
SHORTNESS OF BREATH: 0
SINUS PAIN: 0
EYE PAIN: 0
SINUS PRESSURE: 0
ABDOMINAL PAIN: 0
BACK PAIN: 1
COUGH: 0
CONSTIPATION: 0
NAUSEA: 0
DIARRHEA: 0

## 2019-06-18 NOTE — PROGRESS NOTES
Marc Burr  : 1955    Chief Complaint:     Chief Complaint   Patient presents with    Diabetes       HPI  Marc Burr 70 y.o. presents for   Chief Complaint   Patient presents with    Diabetes     Presents today after being in a nursing home for 7 months. She states she is feeling well overall and is excited to go home. She is not sure exactly what medication she is on though she did bring a medication list in today. She is not sure exactly what her sugars are ranging though her A1c today is 9.2. She states she is not watching her diet and just eats whatever the nursing home gives her. She is currently taking 70 units 3 times a day before meals and at 100 units at night. She was referred to endocrinology by the nursing home and does have an appointment in October. She continues to have lymphedema in her lower extremities and does follow with lymphedema therapy 3 times a week. She does follow with cardiology as well. She recently had iron deficiency anemia and we do need to repeat CBC and iron today. She may need additional iron. She is on iron supplementation daily. She has been sleeping well at night and does not think the trazodone is working for her. She also has been on melatonin though again she has been sleeping well at night. She has not been using Lamisil on her bilateral toenails. She states her stomach has been cooperating with her denies any pain currently. She has been taking Prevacid as prescribed. She is on oxygen continuously at 4 L. She does admit to some shortness of breath with exertion but this is at baseline per patient. She states she will be getting help at home with home health and also visiting nurses and home health aides. All questions were answered to patients satisfaction.     Past Medical History:   Diagnosis Date    Anal fissure     Anemia 10/6/2017    Anxiety and depression     Arthritis     Asthma     Atrial fibrillation ([facility]     pylori[place], Dr. Saumya Dickerson, [facility]    UPPER GASTROINTESTINAL ENDOSCOPY N/A 2018    EGD BIOPSY performed by Janeen Blackmon MD at [address] [facility] N/A 2018    EGD BIOPSY performed by Janeen Blackmon MD at [address] History     Socioeconomic History    Marital status: Single     Spouse name: None    Number of children: 3    Years of education: 9    Highest education level: None   Occupational History    Occupation: SSD   Social Needs    Financial resource strain: None    Food insecurity:     Worry: None     Inability: None    Transportation needs:     Medical: None     Non-medical: None   Tobacco Use    Smoking status: Former Smoker     Packs/day: 1.50     Years: 25.00     Pack years: 37.50     Types: Cigarettes     Start date: 1979     Last attempt to quit: 2004     Years since quittin.5    Smokeless tobacco: Never Used   Substance and Sexual Activity    Alcohol use: No     Comment: caffeine: no use    Drug use: No    Sexual activity: Never   Lifestyle    Physical activity:     Days per week: None     Minutes per session: None    Stress: None   Relationships    Social connections:     Talks on phone: None     Gets together: None     Attends Jewish service: None     Active member of club or organization: None     Attends meetings of clubs or organizations: None     Relationship status: None    Intimate partner violence:     Fear of current or ex partner: None     Emotionally abused: None     Physically abused: None     Forced sexual activity: None   Other Topics Concern    None   Social History Narrative    Denies caffeine.         Family History   Problem Relation Age of Onset    Heart Disease Mother     Diabetes Father     Colon Cancer Father     Other Father         BLINDNESS    Colon Cancer Sister     Diabetes Paternal Aunt     Diabetes Paternal Uncle     Diabetes Paternal Aunt     Diabetes Paternal Uncle Current Outpatient Medications   Medication Sig Dispense Refill    lansoprazole (PREVACID) 30 MG delayed release capsule Take 1 capsule by mouth daily as needed      insulin lispro (HUMALOG) 100 UNIT/ML injection vial Inject 70 Units into the skin 3 times daily (before meals) 1 vial 2    fexofenadine (ALLEGRA) 180 MG tablet Take 1 tablet by mouth daily as needed (allergies) 30 tablet 2    metoprolol succinate (TOPROL XL) 100 MG extended release tablet Take 1 tablet by mouth 2 times daily 180 tablet 3    bumetanide (BUMEX) 1 MG tablet Take 1 mg by mouth 2 times daily      gabapentin (NEURONTIN) 100 MG capsule Take 300 mg by mouth 3 times daily.  oxyCODONE-acetaminophen (PERCOCET) 7.5-325 MG per tablet Take 1 tablet by mouth every 6 hours as needed for Pain.  acetaminophen (TYLENOL) 325 MG tablet Take 650 mg by mouth every 6 hours as needed for Pain      glucose (GLUTOSE) 40 % GEL Take 37.5 mLs by mouth as needed (hypoglycemia) 45 g 0    spironolactone (ALDACTONE) 25 MG tablet Take 1 tablet by mouth daily 30 tablet 0    docusate sodium (COLACE, DULCOLAX) 100 MG CAPS Take 100 mg by mouth 3 times daily as needed for Constipation 10 capsule 0    insulin glargine (LANTUS) 100 UNIT/ML injection vial Inject 100 Units into the skin nightly 1 vial 3    diltiazem (CARDIZEM CD) 240 MG extended release capsule TAKE 1 CAPSULE BY MOUTH 2 TIMES DAILY (Patient taking differently: TAKE 1 CAPSULE BY MOUTH 2 TIMES DAILY) 180 capsule 1    apixaban (ELIQUIS) 5 MG TABS tablet Take 1 tablet by mouth 2 times daily 60 tablet 2    nystatin (MYCOSTATIN) 273921 UNIT/GM cream Apply topically 2 times daily.  60 g 2    escitalopram (LEXAPRO) 20 MG tablet TAKE 1 TABLET BY MOUTH DAILY 90 tablet 1    ferrous sulfate (NAOMY-ALEC) 325 (65 Fe) MG tablet Take 1 tablet by mouth daily (with breakfast) 30 tablet 3    fluticasone (FLONASE) 50 MCG/ACT nasal spray USE 2 SPRAYS BY NASAL ROUTE DAILY (Patient taking differently: USE 2 SPRAYS BY NASAL ROUTE QHS) 1 Bottle 5    fenofibrate (TRICOR) 145 MG tablet TAKE 1 TABLET BY MOUTH DAILY 30 tablet 5    DULERA 200-5 MCG/ACT inhaler INHALE 2 PUFFS INTO LUNGS TWICE A DAY RINSE MOUTH AFTER USE 3 Inhaler 1    ipratropium-albuterol (DUONEB) 0.5-2.5 (3) MG/3ML SOLN nebulizer solution Inhale 3 mLs into the lungs every 4 hours. (Patient taking differently: Inhale 1 vial into the lungs every 4 hours as needed for Shortness of Breath ) 360 mL 5    albuterol sulfate HFA (VENTOLIN HFA) 108 (90 Base) MCG/ACT inhaler INHALE 2 PUFFS INTO LUNGS EVERY 4 HOURS AS NEEDED FOR WHEEZING OR SHORTNESS OF BREATH 18 Inhaler 5    OXYGEN Inhale 4 L into the lungs continuous       Elastic Bandages & Supports MISC 20-30 mmHg bilateral compression stockings  Size to fit  Dx:  Edema  Knee high 2 each 0     No current facility-administered medications for this visit. Allergies   Allergen Reactions    Statins Other (See Comments)     Muscle pains       Health Maintenance Due   Topic Date Due    HIV screen  12/02/1968    DTaP/Tdap/Td vaccine (1 - Tdap) 12/02/1972    Shingles Vaccine (2 of 3) 02/27/2015    Cervical cancer screen  03/15/2016    Low dose CT lung screening  02/02/2018    Diabetic retinal exam  04/27/2018    Breast cancer screen  11/04/2018    DEXA (modify frequency per FRAX score)  12/02/2018    Pneumococcal 65+ years Vaccine (1 of 2 - PCV13) 12/02/2018    A1C test (Diabetic or Prediabetic)  02/11/2019    Diabetic foot exam  05/16/2019    Diabetic microalbuminuria test  05/16/2019    Lipid screen  05/16/2019           REVIEW OF SYSTEMS  Review of Systems   Constitutional: Positive for fatigue. Negative for chills and fever. HENT: Negative for congestion, postnasal drip, sinus pressure, sinus pain and sore throat. Eyes: Negative for pain. Respiratory: Negative for cough and shortness of breath. Cardiovascular: Positive for leg swelling. Negative for chest pain.    Gastrointestinal: time.   Skin: Skin is warm and dry. Legs wrapped currently   Psychiatric: She has a normal mood and affect. Her behavior is normal. Judgment and thought content normal.   Nursing note and vitals reviewed. Laboratory: All laboratory and radiology results have been personally reviewed by myself    Lab Results   Component Value Date     03/21/2019    K 4.4 03/21/2019    CL 95 03/21/2019    CO2 34 03/21/2019    BUN 30 03/21/2019    CREATININE 1.1 03/21/2019    PROT 6.5 12/15/2018    LABALBU 3.4 12/15/2018    LABALBU 4.5 11/02/2011    CALCIUM 9.3 03/21/2019    GFRAA >60 03/21/2019    LABGLOM 50 03/21/2019    GLUCOSE 218 03/21/2019    GLUCOSE 181 12/16/2011    AST 17 12/15/2018    ALT 14 12/15/2018    ALKPHOS 74 12/15/2018    BILITOT 0.3 12/15/2018    TSH 1.760 05/12/2017    VITD25 21 05/12/2017    CHOL 177 05/12/2017    TRIG 285 05/12/2017    HDL 34 05/16/2018    LDLCALC 98 05/16/2018    LABA1C 9.2 06/18/2019        Lab Results   Component Value Date    CHOL 177 05/12/2017    CHOL 208 (H) 08/15/2016    CHOL 239 (H) 05/12/2016     Lab Results   Component Value Date    TRIG 285 (H) 05/12/2017    TRIG 336 (H) 08/15/2016    TRIG 599 (H) 05/12/2016     Lab Results   Component Value Date    HDL 34 05/16/2018    HDL 37 05/12/2017    HDL 40 08/15/2016     Lab Results   Component Value Date    LDLCALC 98 05/16/2018    LDLCALC 83 05/12/2017    LDLCALC 101 (H) 08/15/2016       Lab Results   Component Value Date    LABA1C 9.2 06/18/2019    LABA1C 8.0 (H) 11/11/2018    LABA1C 7.2 05/16/2018     Lab Results   Component Value Date    LABMICR <12.0 05/12/2017    LDLCALC 98 05/16/2018    CREATININE 1.1 (H) 03/21/2019       ASSESSMENT/PLAN:     Diagnosis Orders   1.  Type 2 diabetes mellitus without complication, with long-term current use of insulin (HCC)  POCT glycosylated hemoglobin (Hb A1C)    CBC Auto Differential    BASIC METABOLIC PANEL   2. Diabetic polyneuropathy associated with type 2 diabetes mellitus ([facility] 3. Essential hypertension     4. PAF (paroxysmal atrial fibrillation) (HCC) - currently in SR     5. Chronic diastolic heart failure ([facility]     6. Iron deficiency anemia, unspecified iron deficiency anemia type  Iron and TIBC   7. Lymphedema of both lower extremities     8. Chronic diastolic congestive heart failure ([facility]     9. Chronic obstructive pulmonary disease, unspecified COPD type ([facility]     10. Chronic pain syndrome       She is still nursing home though states she may be released within the next few weeks. If that is the case we will try to consolidate her medications. Her A1c is elevated at 9.2 the patient does not know what her sugars have been ranging. Did recommend for her to start checking her sugars more often so we can adjust medications appropriately. Likely will not will need to change from Lantus to [place] which I did discuss in detail today. Labs to be completed. She may need additional iron supplementation. Continue to follow with cardiology. Continue to follow with lymphedema clinic. She does have an appointment with endocrinology in October. She is still following with the pain clinic. Medications stopped or changed to as needed: trazodone and melatonin stopped. Use allegra as needed for allergies. More than 40 minutes was spent with the patient during the encounter, during which more than half the time was spent counseling on the above concerns    Problem list reviewed andsimplified/updated  HM reviewed today and counseled as appropriate    Call or go to ED immediately if symptoms worsen or persist.  Future Appointments   Date Time Provider Jason Muir   7/18/2019 12:30 PM DO [facility]   10/10/2019  2:40 PM Lucy Baca MD [facility]     Or sooner if necessary.       Educational materials and/or homeexercises printed for patient's review and were included in patient instructions on his/her After Visit Summary and given to patient at the end of visit.       Counseled regarding above diagnosis, including possible risks and complications,  especially if left uncontrolled.     Counseled regarding the possible side effects, risks, benefits and alternatives to treatment; patient and/or guardian verbalizes understanding, agrees,feels comfortable with and wishes to proceed with above treatment plan.     Advised patient to call [facility] new medication issues, and read all Rx info from pharmacy to assure aware of all possible risks and side effects of medication before taking.     Reviewed age and gender appropriate health screening exams and vaccinations. Advised patient regarding importance of keeping up with recommended health maintenance and toschedule as soon as possible if overdue, as this is important in assessing for undiagnosed pathology, especially cancer, as well as protecting against potentially harmful/life threatening disease.          Patient and/or guardian verbalizes understanding and agrees with above counseling, assessment and plan.     All questions answered. Garland 5, DO  6/18/19    NOTE: This report was transcribed using voice recognition software.  Every effort was made to ensure accuracy; however, inadvertent computerized transcription errors may be present

## 2019-06-20 ENCOUNTER — TELEPHONE (OUTPATIENT)
Dept: FAMILY MEDICINE CLINIC | Age: 64
End: 2019-06-20

## 2019-06-20 DIAGNOSIS — I89.0 LYMPHEDEMA OF BOTH LOWER EXTREMITIES: Primary | ICD-10-CM

## 2019-06-20 NOTE — TELEPHONE ENCOUNTER
Chance Dawn with [facility] requesting orders to be faxed to [phone] for home therapy and lymphedema treatments through [employer].

## 2019-09-12 ENCOUNTER — TELEPHONE (OUTPATIENT)
Dept: ADMINISTRATIVE | Age: 64
End: 2019-09-12

## 2019-09-12 ENCOUNTER — HOSPITAL ENCOUNTER (OUTPATIENT)
Dept: HOSPITAL 62 - RAD | Age: 64
End: 2019-09-12
Attending: NURSE PRACTITIONER
Payer: MEDICARE

## 2019-09-12 DIAGNOSIS — M25.561: Primary | ICD-10-CM

## 2019-09-12 NOTE — RADIOLOGY REPORT (SQ)
EXAM DESCRIPTION:  KNEE RIGHT 4 VIEWS



COMPLETED DATE/TIME:  9/12/2019 10:16 am



REASON FOR STUDY:   R KNEE PAIN



COMPARISON:  None.



NUMBER OF VIEWS:  Four views.



TECHNIQUE:  AP, lateral, and both oblique radiographic images acquired of the right knee.



LIMITATIONS:  None.



FINDINGS:  MINERALIZATION: Normal.

BONES: No acute fracture or dislocation.  No worrisome bone lesions.

JOINT: No joint effusion.  There is narrowing of the patellofemoral joint with small posterior patell
ar and trochlear osteophytes.

SOFT TISSUES: No soft tissue swelling.  No radio-opaque foreign body.

OTHER: No other significant finding.



IMPRESSION:  Degenerative joint disease.



TECHNICAL DOCUMENTATION:  JOB ID:  [id number]

 2011 [employer]- All Rights Reserved



Reading location - IP/workstation name: [email or web address]

## 2019-09-20 DIAGNOSIS — E61.1 IRON DEFICIENCY: ICD-10-CM

## 2019-09-20 RX ORDER — FERROUS SULFATE 325(65) MG
325 TABLET ORAL
Qty: 30 TABLET | Refills: 0 | Status: SHIPPED | OUTPATIENT
Start: 2019-09-20 | End: 2019-10-13 | Stop reason: SDUPTHER

## 2019-09-20 RX ORDER — METOPROLOL SUCCINATE 200 MG/1
100 TABLET, EXTENDED RELEASE ORAL 2 TIMES DAILY
Status: ON HOLD | COMMUNITY
End: 2019-09-20 | Stop reason: SDUPTHER

## 2019-09-20 RX ORDER — METOPROLOL SUCCINATE 100 MG/1
100 TABLET, EXTENDED RELEASE ORAL 2 TIMES DAILY
Qty: 60 TABLET | Refills: 0 | Status: SHIPPED | OUTPATIENT
Start: 2019-09-20 | End: 2019-09-20 | Stop reason: RX

## 2019-09-20 RX ORDER — GABAPENTIN 100 MG/1
300 CAPSULE ORAL 3 TIMES DAILY
Qty: 90 CAPSULE | Refills: 0 | Status: SHIPPED | OUTPATIENT
Start: 2019-09-20 | End: 2019-10-10 | Stop reason: SDUPTHER

## 2019-09-22 ENCOUNTER — APPOINTMENT (OUTPATIENT)
Dept: GENERAL RADIOLOGY | Age: 64
DRG: 383 | End: 2019-09-22
Payer: MEDICAID

## 2019-09-22 ENCOUNTER — HOSPITAL ENCOUNTER (INPATIENT)
Age: 64
LOS: 2 days | Discharge: HOME | DRG: 383 | End: 2019-09-24
Attending: EMERGENCY MEDICINE | Admitting: INTERNAL MEDICINE
Payer: MEDICAID

## 2019-09-22 DIAGNOSIS — L03.119 CELLULITIS OF LOWER EXTREMITY, UNSPECIFIED LATERALITY: Primary | ICD-10-CM

## 2019-09-22 DIAGNOSIS — L97.929 ULCERS OF BOTH LOWER LEGS (HCC): ICD-10-CM

## 2019-09-22 DIAGNOSIS — L97.919 ULCERS OF BOTH LOWER LEGS (HCC): ICD-10-CM

## 2019-09-22 PROBLEM — L03.90 CELLULITIS: Status: ACTIVE | Noted: 2019-09-22

## 2019-09-22 LAB
ANION GAP SERPL CALCULATED.3IONS-SCNC: 9 MMOL/L (ref 7–16)
BASOPHILS ABSOLUTE: 0.03 E9/L (ref 0–0.2)
BASOPHILS RELATIVE PERCENT: 0.5 % (ref 0–2)
BUN BLDV-MCNC: 17 MG/DL (ref 8–23)
CALCIUM SERPL-MCNC: 9.6 MG/DL (ref 8.6–10.2)
CHLORIDE BLD-SCNC: 97 MMOL/L (ref 98–107)
CO2: 33 MMOL/L (ref 22–29)
CREAT SERPL-MCNC: 0.9 MG/DL (ref 0.5–1)
EOSINOPHILS ABSOLUTE: 0.19 E9/L (ref 0.05–0.5)
EOSINOPHILS RELATIVE PERCENT: 2.9 % (ref 0–6)
GFR AFRICAN AMERICAN: >60
GFR NON-AFRICAN AMERICAN: >60 ML/MIN/1.73
GLUCOSE BLD-MCNC: 157 MG/DL (ref 74–99)
HCT VFR BLD CALC: 28.9 % (ref 34–48)
HEMOGLOBIN: 8.5 G/DL (ref 11.5–15.5)
IMMATURE GRANULOCYTES #: 0.03 E9/L
IMMATURE GRANULOCYTES %: 0.5 % (ref 0–5)
LACTIC ACID, SEPSIS: 1.3 MMOL/L (ref 0.5–1.9)
LYMPHOCYTES ABSOLUTE: 1.25 E9/L (ref 1.5–4)
LYMPHOCYTES RELATIVE PERCENT: 18.9 % (ref 20–42)
MCH RBC QN AUTO: 25.4 PG (ref 26–35)
MCHC RBC AUTO-ENTMCNC: 29.4 % (ref 32–34.5)
MCV RBC AUTO: 86.5 FL (ref 80–99.9)
MONOCYTES ABSOLUTE: 0.44 E9/L (ref 0.1–0.95)
MONOCYTES RELATIVE PERCENT: 6.6 % (ref 2–12)
NEUTROPHILS ABSOLUTE: 4.68 E9/L (ref 1.8–7.3)
NEUTROPHILS RELATIVE PERCENT: 70.6 % (ref 43–80)
PDW BLD-RTO: 15.4 FL (ref 11.5–15)
PLATELET # BLD: 233 E9/L (ref 130–450)
PMV BLD AUTO: 9.1 FL (ref 7–12)
POTASSIUM SERPL-SCNC: 4.4 MMOL/L (ref 3.5–5)
RBC # BLD: 3.34 E12/L (ref 3.5–5.5)
SEDIMENTATION RATE, ERYTHROCYTE: 38 MM/HR (ref 0–20)
SODIUM BLD-SCNC: 139 MMOL/L (ref 132–146)
WBC # BLD: 6.6 E9/L (ref 4.5–11.5)

## 2019-09-22 PROCEDURE — 83605 ASSAY OF LACTIC ACID: CPT

## 2019-09-22 PROCEDURE — 1200000000 HC SEMI PRIVATE

## 2019-09-22 PROCEDURE — 6360000002 HC RX W HCPCS: Performed by: EMERGENCY MEDICINE

## 2019-09-22 PROCEDURE — 87040 BLOOD CULTURE FOR BACTERIA: CPT

## 2019-09-22 PROCEDURE — 73590 X-RAY EXAM OF LOWER LEG: CPT

## 2019-09-22 PROCEDURE — 2580000003 HC RX 258: Performed by: EMERGENCY MEDICINE

## 2019-09-22 PROCEDURE — 99284 EMERGENCY DEPT VISIT MOD MDM: CPT

## 2019-09-22 PROCEDURE — 85651 RBC SED RATE NONAUTOMATED: CPT

## 2019-09-22 PROCEDURE — 87070 CULTURE OTHR SPECIMN AEROBIC: CPT

## 2019-09-22 PROCEDURE — 86140 C-REACTIVE PROTEIN: CPT

## 2019-09-22 PROCEDURE — 36415 COLL VENOUS BLD VENIPUNCTURE: CPT

## 2019-09-22 PROCEDURE — 85025 COMPLETE CBC W/AUTO DIFF WBC: CPT

## 2019-09-22 PROCEDURE — 80048 BASIC METABOLIC PNL TOTAL CA: CPT

## 2019-09-22 RX ORDER — 0.9 % SODIUM CHLORIDE 0.9 %
250 INTRAVENOUS SOLUTION INTRAVENOUS ONCE
Status: COMPLETED | OUTPATIENT
Start: 2019-09-22 | End: 2019-09-22

## 2019-09-22 RX ADMIN — SODIUM CHLORIDE 250 ML: 9 INJECTION, SOLUTION INTRAVENOUS at 22:27

## 2019-09-22 RX ADMIN — CEFEPIME HYDROCHLORIDE 2 G: 2 INJECTION, POWDER, FOR SOLUTION INTRAVENOUS at 23:27

## 2019-09-22 RX ADMIN — SODIUM CHLORIDE 250 ML: 9 INJECTION, SOLUTION INTRAVENOUS at 23:00

## 2019-09-22 ASSESSMENT — ENCOUNTER SYMPTOMS
ABDOMINAL PAIN: 0
SHORTNESS OF BREATH: 0
WHEEZING: 0
VOMITING: 0
EYE PAIN: 0
NAUSEA: 0
DIARRHEA: 0
SINUS PRESSURE: 0
ABDOMINAL DISTENTION: 0
SORE THROAT: 0
STRIDOR: 0
EYE DISCHARGE: 0
COUGH: 0
BACK PAIN: 0
EYE REDNESS: 0
CONSTIPATION: 0
RHINORRHEA: 0

## 2019-09-22 ASSESSMENT — PAIN DESCRIPTION - PAIN TYPE
TYPE: ACUTE PAIN
TYPE: CHRONIC PAIN

## 2019-09-22 ASSESSMENT — PAIN DESCRIPTION - ORIENTATION
ORIENTATION: RIGHT
ORIENTATION: LEFT

## 2019-09-22 ASSESSMENT — PAIN DESCRIPTION - DESCRIPTORS: DESCRIPTORS: ACHING

## 2019-09-22 ASSESSMENT — PAIN DESCRIPTION - FREQUENCY
FREQUENCY: CONTINUOUS
FREQUENCY: CONTINUOUS

## 2019-09-22 ASSESSMENT — PAIN SCALES - GENERAL
PAINLEVEL_OUTOF10: 3
PAINLEVEL_OUTOF10: 3

## 2019-09-22 ASSESSMENT — PAIN DESCRIPTION - LOCATION: LOCATION: LEG

## 2019-09-23 LAB
ALBUMIN SERPL-MCNC: 4.3 G/DL (ref 3.5–5.2)
ALP BLD-CCNC: 70 U/L (ref 35–104)
ALT SERPL-CCNC: 15 U/L (ref 0–32)
ANION GAP SERPL CALCULATED.3IONS-SCNC: 11 MMOL/L (ref 7–16)
AST SERPL-CCNC: 17 U/L (ref 0–31)
BASOPHILS ABSOLUTE: 0.02 E9/L (ref 0–0.2)
BASOPHILS RELATIVE PERCENT: 0.3 % (ref 0–2)
BILIRUB SERPL-MCNC: 0.3 MG/DL (ref 0–1.2)
BUN BLDV-MCNC: 17 MG/DL (ref 8–23)
C-REACTIVE PROTEIN: 2 MG/DL (ref 0–0.4)
CALCIUM SERPL-MCNC: 9.4 MG/DL (ref 8.6–10.2)
CHLORIDE BLD-SCNC: 96 MMOL/L (ref 98–107)
CO2: 31 MMOL/L (ref 22–29)
CREAT SERPL-MCNC: 0.9 MG/DL (ref 0.5–1)
EOSINOPHILS ABSOLUTE: 0.2 E9/L (ref 0.05–0.5)
EOSINOPHILS RELATIVE PERCENT: 2.7 % (ref 0–6)
GFR AFRICAN AMERICAN: >60
GFR NON-AFRICAN AMERICAN: >60 ML/MIN/1.73
GLUCOSE BLD-MCNC: 261 MG/DL (ref 74–99)
HBA1C MFR BLD: 8.6 % (ref 4–5.6)
HCT VFR BLD CALC: 30.5 % (ref 34–48)
HEMOGLOBIN: 9.1 G/DL (ref 11.5–15.5)
IMMATURE GRANULOCYTES #: 0.04 E9/L
IMMATURE GRANULOCYTES %: 0.5 % (ref 0–5)
LYMPHOCYTES ABSOLUTE: 1.19 E9/L (ref 1.5–4)
LYMPHOCYTES RELATIVE PERCENT: 16.3 % (ref 20–42)
MCH RBC QN AUTO: 25.9 PG (ref 26–35)
MCHC RBC AUTO-ENTMCNC: 29.8 % (ref 32–34.5)
MCV RBC AUTO: 86.9 FL (ref 80–99.9)
METER GLUCOSE: 199 MG/DL (ref 74–99)
METER GLUCOSE: 218 MG/DL (ref 74–99)
METER GLUCOSE: 284 MG/DL (ref 74–99)
METER GLUCOSE: 91 MG/DL (ref 74–99)
MONOCYTES ABSOLUTE: 0.52 E9/L (ref 0.1–0.95)
MONOCYTES RELATIVE PERCENT: 7.1 % (ref 2–12)
NEUTROPHILS ABSOLUTE: 5.33 E9/L (ref 1.8–7.3)
NEUTROPHILS RELATIVE PERCENT: 73.1 % (ref 43–80)
PDW BLD-RTO: 15.4 FL (ref 11.5–15)
PLATELET # BLD: 239 E9/L (ref 130–450)
PMV BLD AUTO: 9.3 FL (ref 7–12)
POTASSIUM REFLEX MAGNESIUM: 4.6 MMOL/L (ref 3.5–5)
RBC # BLD: 3.51 E12/L (ref 3.5–5.5)
SODIUM BLD-SCNC: 138 MMOL/L (ref 132–146)
TOTAL PROTEIN: 7.1 G/DL (ref 6.4–8.3)
WBC # BLD: 7.3 E9/L (ref 4.5–11.5)

## 2019-09-23 PROCEDURE — 83036 HEMOGLOBIN GLYCOSYLATED A1C: CPT

## 2019-09-23 PROCEDURE — 36415 COLL VENOUS BLD VENIPUNCTURE: CPT

## 2019-09-23 PROCEDURE — 6370000000 HC RX 637 (ALT 250 FOR IP): Performed by: INTERNAL MEDICINE

## 2019-09-23 PROCEDURE — 2580000003 HC RX 258: Performed by: INTERNAL MEDICINE

## 2019-09-23 PROCEDURE — 6360000002 HC RX W HCPCS: Performed by: INTERNAL MEDICINE

## 2019-09-23 PROCEDURE — 97161 PT EVAL LOW COMPLEX 20 MIN: CPT

## 2019-09-23 PROCEDURE — 2500000003 HC RX 250 WO HCPCS: Performed by: INTERNAL MEDICINE

## 2019-09-23 PROCEDURE — 97165 OT EVAL LOW COMPLEX 30 MIN: CPT

## 2019-09-23 PROCEDURE — 99223 1ST HOSP IP/OBS HIGH 75: CPT | Performed by: INTERNAL MEDICINE

## 2019-09-23 PROCEDURE — 85025 COMPLETE CBC W/AUTO DIFF WBC: CPT

## 2019-09-23 PROCEDURE — 82962 GLUCOSE BLOOD TEST: CPT

## 2019-09-23 PROCEDURE — 2580000003 HC RX 258: Performed by: EMERGENCY MEDICINE

## 2019-09-23 PROCEDURE — 2700000000 HC OXYGEN THERAPY PER DAY

## 2019-09-23 PROCEDURE — 80053 COMPREHEN METABOLIC PANEL: CPT

## 2019-09-23 PROCEDURE — 1200000000 HC SEMI PRIVATE

## 2019-09-23 PROCEDURE — 6360000002 HC RX W HCPCS: Performed by: EMERGENCY MEDICINE

## 2019-09-23 RX ORDER — DILTIAZEM HYDROCHLORIDE 180 MG/1
180 CAPSULE, COATED, EXTENDED RELEASE ORAL 2 TIMES DAILY
Status: DISCONTINUED | OUTPATIENT
Start: 2019-09-23 | End: 2019-09-24 | Stop reason: HOSPADM

## 2019-09-23 RX ORDER — GABAPENTIN 300 MG/1
300 CAPSULE ORAL 3 TIMES DAILY
Status: DISCONTINUED | OUTPATIENT
Start: 2019-09-23 | End: 2019-09-24 | Stop reason: HOSPADM

## 2019-09-23 RX ORDER — BUMETANIDE 1 MG/1
1 TABLET ORAL 2 TIMES DAILY
Status: DISCONTINUED | OUTPATIENT
Start: 2019-09-23 | End: 2019-09-24 | Stop reason: HOSPADM

## 2019-09-23 RX ORDER — NICOTINE POLACRILEX 4 MG
15 LOZENGE BUCCAL PRN
Status: DISCONTINUED | OUTPATIENT
Start: 2019-09-23 | End: 2019-09-24 | Stop reason: HOSPADM

## 2019-09-23 RX ORDER — OXYCODONE AND ACETAMINOPHEN 7.5; 325 MG/1; MG/1
1 TABLET ORAL EVERY 6 HOURS PRN
Status: DISCONTINUED | OUTPATIENT
Start: 2019-09-23 | End: 2019-09-23 | Stop reason: CLARIF

## 2019-09-23 RX ORDER — CEFAZOLIN SODIUM 2 G/50ML
2 SOLUTION INTRAVENOUS EVERY 8 HOURS
Status: DISCONTINUED | OUTPATIENT
Start: 2019-09-23 | End: 2019-09-24 | Stop reason: HOSPADM

## 2019-09-23 RX ORDER — SPIRONOLACTONE 25 MG/1
25 TABLET ORAL DAILY
Status: DISCONTINUED | OUTPATIENT
Start: 2019-09-23 | End: 2019-09-24 | Stop reason: HOSPADM

## 2019-09-23 RX ORDER — DOXYCYCLINE HYCLATE 100 MG/1
100 CAPSULE ORAL EVERY 12 HOURS SCHEDULED
Status: DISCONTINUED | OUTPATIENT
Start: 2019-09-23 | End: 2019-09-24 | Stop reason: HOSPADM

## 2019-09-23 RX ORDER — SODIUM CHLORIDE 0.9 % (FLUSH) 0.9 %
10 SYRINGE (ML) INJECTION PRN
Status: DISCONTINUED | OUTPATIENT
Start: 2019-09-23 | End: 2019-09-24 | Stop reason: HOSPADM

## 2019-09-23 RX ORDER — DEXTROSE MONOHYDRATE 50 MG/ML
100 INJECTION, SOLUTION INTRAVENOUS PRN
Status: DISCONTINUED | OUTPATIENT
Start: 2019-09-23 | End: 2019-09-24 | Stop reason: HOSPADM

## 2019-09-23 RX ORDER — ACETAMINOPHEN 325 MG/1
650 TABLET ORAL EVERY 4 HOURS PRN
Status: DISCONTINUED | OUTPATIENT
Start: 2019-09-23 | End: 2019-09-24 | Stop reason: HOSPADM

## 2019-09-23 RX ORDER — DOXYCYCLINE HYCLATE 100 MG/1
100 CAPSULE ORAL EVERY 12 HOURS SCHEDULED
Qty: 20 CAPSULE | Refills: 0 | Status: SHIPPED | OUTPATIENT
Start: 2019-09-23 | End: 2019-10-03

## 2019-09-23 RX ORDER — SODIUM CHLORIDE 0.9 % (FLUSH) 0.9 %
10 SYRINGE (ML) INJECTION EVERY 12 HOURS SCHEDULED
Status: DISCONTINUED | OUTPATIENT
Start: 2019-09-23 | End: 2019-09-24 | Stop reason: HOSPADM

## 2019-09-23 RX ORDER — METOPROLOL SUCCINATE 200 MG/1
100 TABLET, EXTENDED RELEASE ORAL 2 TIMES DAILY
Qty: 90 TABLET | Refills: 3 | Status: ON HOLD
Start: 2019-09-23 | End: 2020-08-20 | Stop reason: HOSPADM

## 2019-09-23 RX ORDER — DEXTROSE MONOHYDRATE 25 G/50ML
12.5 INJECTION, SOLUTION INTRAVENOUS PRN
Status: DISCONTINUED | OUTPATIENT
Start: 2019-09-23 | End: 2019-09-24 | Stop reason: HOSPADM

## 2019-09-23 RX ORDER — INSULIN GLARGINE 100 [IU]/ML
100 INJECTION, SOLUTION SUBCUTANEOUS NIGHTLY
Status: DISCONTINUED | OUTPATIENT
Start: 2019-09-23 | End: 2019-09-24 | Stop reason: HOSPADM

## 2019-09-23 RX ORDER — OXYCODONE HYDROCHLORIDE 5 MG/1
2.5 TABLET ORAL EVERY 6 HOURS PRN
Status: DISCONTINUED | OUTPATIENT
Start: 2019-09-23 | End: 2019-09-24 | Stop reason: HOSPADM

## 2019-09-23 RX ORDER — CEPHALEXIN 500 MG/1
500 CAPSULE ORAL 4 TIMES DAILY
Qty: 40 CAPSULE | Refills: 0 | Status: SHIPPED | OUTPATIENT
Start: 2019-09-23 | End: 2019-10-03

## 2019-09-23 RX ORDER — AMMONIUM LACTATE 12 G/100G
LOTION TOPICAL DAILY
Status: DISCONTINUED | OUTPATIENT
Start: 2019-09-23 | End: 2019-09-24 | Stop reason: HOSPADM

## 2019-09-23 RX ORDER — ONDANSETRON 2 MG/ML
4 INJECTION INTRAMUSCULAR; INTRAVENOUS EVERY 6 HOURS PRN
Status: DISCONTINUED | OUTPATIENT
Start: 2019-09-23 | End: 2019-09-24 | Stop reason: HOSPADM

## 2019-09-23 RX ORDER — METOPROLOL SUCCINATE 100 MG/1
100 TABLET, EXTENDED RELEASE ORAL 2 TIMES DAILY
Status: DISCONTINUED | OUTPATIENT
Start: 2019-09-23 | End: 2019-09-24 | Stop reason: HOSPADM

## 2019-09-23 RX ORDER — OXYCODONE HYDROCHLORIDE AND ACETAMINOPHEN 5; 325 MG/1; MG/1
1 TABLET ORAL EVERY 6 HOURS PRN
Status: DISCONTINUED | OUTPATIENT
Start: 2019-09-23 | End: 2019-09-24 | Stop reason: HOSPADM

## 2019-09-23 RX ADMIN — DOXYCYCLINE HYCLATE 100 MG: 100 CAPSULE ORAL at 20:52

## 2019-09-23 RX ADMIN — ACETAMINOPHEN 650 MG: 325 TABLET ORAL at 16:18

## 2019-09-23 RX ADMIN — METOPROLOL SUCCINATE 100 MG: 100 TABLET, EXTENDED RELEASE ORAL at 08:36

## 2019-09-23 RX ADMIN — SPIRONOLACTONE 25 MG: 25 TABLET ORAL at 08:36

## 2019-09-23 RX ADMIN — CEFEPIME HYDROCHLORIDE 2 G: 2 INJECTION, POWDER, FOR SOLUTION INTRAVENOUS at 10:04

## 2019-09-23 RX ADMIN — OXYCODONE HYDROCHLORIDE 2.5 MG: 5 TABLET ORAL at 03:47

## 2019-09-23 RX ADMIN — MICONAZOLE NITRATE: 20 POWDER TOPICAL at 16:29

## 2019-09-23 RX ADMIN — GABAPENTIN 300 MG: 300 CAPSULE ORAL at 20:59

## 2019-09-23 RX ADMIN — OXYCODONE HYDROCHLORIDE AND ACETAMINOPHEN 1 TABLET: 5; 325 TABLET ORAL at 09:56

## 2019-09-23 RX ADMIN — INSULIN LISPRO 6 UNITS: 100 INJECTION, SOLUTION INTRAVENOUS; SUBCUTANEOUS at 08:37

## 2019-09-23 RX ADMIN — INSULIN LISPRO 2 UNITS: 100 INJECTION, SOLUTION INTRAVENOUS; SUBCUTANEOUS at 16:21

## 2019-09-23 RX ADMIN — APIXABAN 5 MG: 5 TABLET, FILM COATED ORAL at 20:53

## 2019-09-23 RX ADMIN — BUMETANIDE 1 MG: 1 TABLET ORAL at 20:52

## 2019-09-23 RX ADMIN — FAMOTIDINE 20 MG: 10 INJECTION, SOLUTION INTRAVENOUS at 08:36

## 2019-09-23 RX ADMIN — Medication 10 ML: at 08:36

## 2019-09-23 RX ADMIN — INSULIN LISPRO 70 UNITS: 100 INJECTION, SOLUTION INTRAVENOUS; SUBCUTANEOUS at 16:25

## 2019-09-23 RX ADMIN — CEFAZOLIN SODIUM 2 G: 2 SOLUTION INTRAVENOUS at 14:50

## 2019-09-23 RX ADMIN — APIXABAN 5 MG: 5 TABLET, FILM COATED ORAL at 08:36

## 2019-09-23 RX ADMIN — INSULIN LISPRO 2 UNITS: 100 INJECTION, SOLUTION INTRAVENOUS; SUBCUTANEOUS at 21:25

## 2019-09-23 RX ADMIN — Medication 10 ML: at 21:00

## 2019-09-23 RX ADMIN — OXYCODONE HYDROCHLORIDE AND ACETAMINOPHEN 1 TABLET: 5; 325 TABLET ORAL at 20:54

## 2019-09-23 RX ADMIN — DILTIAZEM HYDROCHLORIDE 180 MG: 180 CAPSULE, COATED, EXTENDED RELEASE ORAL at 20:53

## 2019-09-23 RX ADMIN — DILTIAZEM HYDROCHLORIDE 180 MG: 180 CAPSULE, COATED, EXTENDED RELEASE ORAL at 08:36

## 2019-09-23 RX ADMIN — OXYCODONE HYDROCHLORIDE 2.5 MG: 5 TABLET ORAL at 09:56

## 2019-09-23 RX ADMIN — BUMETANIDE 1 MG: 1 TABLET ORAL at 08:36

## 2019-09-23 RX ADMIN — GABAPENTIN 300 MG: 300 CAPSULE ORAL at 08:36

## 2019-09-23 RX ADMIN — INSULIN LISPRO 70 UNITS: 100 INJECTION, SOLUTION INTRAVENOUS; SUBCUTANEOUS at 08:39

## 2019-09-23 RX ADMIN — INSULIN GLARGINE 100 UNITS: 100 INJECTION, SOLUTION SUBCUTANEOUS at 21:03

## 2019-09-23 RX ADMIN — OXYCODONE HYDROCHLORIDE 2.5 MG: 5 TABLET ORAL at 20:54

## 2019-09-23 RX ADMIN — VANCOMYCIN HYDROCHLORIDE 2500 MG: 5 INJECTION, POWDER, LYOPHILIZED, FOR SOLUTION INTRAVENOUS at 02:14

## 2019-09-23 RX ADMIN — GABAPENTIN 300 MG: 300 CAPSULE ORAL at 13:10

## 2019-09-23 RX ADMIN — OXYCODONE HYDROCHLORIDE AND ACETAMINOPHEN 1 TABLET: 5; 325 TABLET ORAL at 03:47

## 2019-09-23 RX ADMIN — CEFAZOLIN SODIUM 2 G: 2 SOLUTION INTRAVENOUS at 21:22

## 2019-09-23 RX ADMIN — FAMOTIDINE 20 MG: 10 INJECTION, SOLUTION INTRAVENOUS at 20:55

## 2019-09-23 RX ADMIN — METOPROLOL SUCCINATE 100 MG: 100 TABLET, EXTENDED RELEASE ORAL at 20:53

## 2019-09-23 ASSESSMENT — PAIN - FUNCTIONAL ASSESSMENT
PAIN_FUNCTIONAL_ASSESSMENT: ACTIVITIES ARE NOT PREVENTED
PAIN_FUNCTIONAL_ASSESSMENT: ACTIVITIES ARE NOT PREVENTED
PAIN_FUNCTIONAL_ASSESSMENT: PREVENTS OR INTERFERES SOME ACTIVE ACTIVITIES AND ADLS
PAIN_FUNCTIONAL_ASSESSMENT: PREVENTS OR INTERFERES SOME ACTIVE ACTIVITIES AND ADLS

## 2019-09-23 ASSESSMENT — PAIN SCALES - GENERAL
PAINLEVEL_OUTOF10: 5
PAINLEVEL_OUTOF10: 3
PAINLEVEL_OUTOF10: 4
PAINLEVEL_OUTOF10: 8
PAINLEVEL_OUTOF10: 8
PAINLEVEL_OUTOF10: 7
PAINLEVEL_OUTOF10: 8
PAINLEVEL_OUTOF10: 5
PAINLEVEL_OUTOF10: 0

## 2019-09-23 ASSESSMENT — PAIN DESCRIPTION - FREQUENCY
FREQUENCY: CONTINUOUS

## 2019-09-23 ASSESSMENT — PAIN DESCRIPTION - DESCRIPTORS
DESCRIPTORS: ACHING
DESCRIPTORS: CONSTANT;ACHING;DISCOMFORT
DESCRIPTORS: CONSTANT;ACHING;DISCOMFORT
DESCRIPTORS: ACHING

## 2019-09-23 ASSESSMENT — PAIN DESCRIPTION - PAIN TYPE
TYPE: CHRONIC PAIN

## 2019-09-23 ASSESSMENT — PAIN DESCRIPTION - PROGRESSION
CLINICAL_PROGRESSION: NOT CHANGED

## 2019-09-23 ASSESSMENT — PAIN DESCRIPTION - ONSET
ONSET: ON-GOING

## 2019-09-23 ASSESSMENT — PAIN DESCRIPTION - LOCATION
LOCATION: LEG

## 2019-09-23 ASSESSMENT — PAIN DESCRIPTION - ORIENTATION
ORIENTATION: RIGHT;LEFT
ORIENTATION: RIGHT
ORIENTATION: RIGHT;LEFT
ORIENTATION: RIGHT;LEFT

## 2019-09-23 NOTE — H&P
4/9/2007    injection of anal sphincter with Botox, Dr. Svetlana Petty, [facility] ANOSCOPY  6/13/2007    injection of anal sphincter with Botox, Franklyn Gomez [facility]    ANUS SURGERY  4/4/2006    Lateral sphincterotomy for chronic anal fissure, Dr. Micheal Callejas, [facility]  4/18/2012    anal exam and injection of Botox 100 units into anal sphincter, Laila Gomez, [facility]    APPENDECTOMY  1965   [address]    COLONOSCOPY  1/20/2004    cecal polyp, bx (no pathologic changes), Dr. Georgia Arana, [address] [facility] COLONOSCOPY  8/11/2006    snare polypectomy terminal ileum polyp (granulation tissue polyp) and anal polyp (inflammatory/papilla), Dr. Svetlana Petty, [address] [facility] COLONOSCOPY  3/23/2012    bx/cauterization proximal ascending colon polyp, injection of anal sphincter with Botox, Dr. Svetlana Petty, [address] REPLACEMENT  2003 [address] [facility], SKIN, SUB-Q TISSUE,MUSCLE,=<20 SQ CM Left 11/30/2018    LEFT LEG EXCISIONAL  DEBRIDEMENT WITH TISSUE BIOPSY performed by Thuy Carrillo DPM at [address] [facility] ENDOSCOPY  1/20/2004    gastritis, bx (no H pylori), Dr. Georgia Arana, [address] ENDOSCOPY N/A 6/1/2018    EGD BIOPSY performed by Vickie Mcneal MD at [address] [place] [address],Third Floor 11/9/2018    EGD BIOPSY performed by Vickie Mcneal MD at [facility] ENDOSCOPY       Medications Prior to Admission:    Prior to Admission medications    Medication Sig Start Date End Date Taking? Authorizing Provider   gabapentin (NEURONTIN) 100 MG capsule Take 3 capsules by mouth 3 times daily for 30 days.  9/20/19 10/20/19 Yes Fabiola Valenzuela, DO   apixaban (ELIQUIS) 5 MG TABS tablet Take 1 tablet by mouth 2 times daily 9/20/19  Yes Soraida Alvarez, DO   ferrous sulfate (NAOMY-ALEC) 325 (65 Fe) MG tablet Take 1 tablet by mouth daily (with breakfast) 9/20/19  Yes Soraida Miller in no acute distress  Skin: warm and dry, lower extremities with lymphedema, erythema, tenderness  Head: normocephalic and atraumatic  Eyes: pupils equal, round, and reactive to light, extraocular eye movements intact, conjunctivae normal  ENT: tympanic membrane, external ear and ear canal normal bilaterally, nose without deformity, nasal mucosa and turbinates normal without polyps  Neck: supple and non-tender without mass, no thyromegaly or thyroid nodules, no cervical lymphadenopathy  Pulmonary/Chest: clear to auscultation bilaterally- no wheezes, rales or rhonchi, normal air movement, no respiratory distress  Cardiovascular: Regular rate and rhythm, normal S1 and S2, no murmurs, rubs, clicks, or gallops, distal pulses intact, no carotid bruits, edema in the lower extremities and venous stasis  Abdomen: soft, non-tender, non-distended, normal bowel sounds, no masses or organomegaly  Extremities: no cyanosis, clubbing, lower extremities with lymphedema with venous stasis wounds, erythema and tenderness. Pus and serosanguineous discharge more from the right leg. Musculoskeletal: normal range of motion, no joint swelling, deformity or tenderness  Neurologic: reflexes normal and symmetric, no cranial nerve deficit, gait, coordination and speech normal      LABS:  Recent Labs     09/22/19  2152      K 4.4   CL 97*   CO2 33*   BUN 17   CREATININE 0.9   GLUCOSE 157*   CALCIUM 9.6       Recent Labs     09/22/19  2152   WBC 6.6   RBC 3.34*   HGB 8.5*   HCT 28.9*   MCV 86.5   MCH 25.4*   MCHC 29.4*   RDW 15.4*      MPV 9.1       No results for input(s): POCGLU in the last 72 hours.     CBC with Differential:    Lab Results   Component Value Date    WBC 6.6 09/22/2019    RBC 3.34 09/22/2019    HGB 8.5 09/22/2019    HCT 28.9 09/22/2019     09/22/2019    MCV 86.5 09/22/2019    MCH 25.4 09/22/2019    MCHC 29.4 09/22/2019    RDW 15.4 09/22/2019    NRBC 0.0 12/04/2018    SEGSPCT 85 01/29/2014    METASPCT 3.5

## 2019-09-23 NOTE — PLAN OF CARE
Problem: Pain:  Goal: Pain level will decrease  Description  Pain level will decrease  Outcome: Met This Shift  Goal: Control of acute pain  Description  Control of acute pain  Outcome: Met This Shift  Goal: Control of chronic pain  Description  Control of chronic pain  Outcome: Met This Shift     Problem: Falls - Risk of:  Goal: Will remain free from falls  Description  Will remain free from falls  Outcome: Met This Shift  Goal: Absence of physical injury  Description  Absence of physical injury  Outcome: Met This Shift     Problem: Risk for Impaired Skin Integrity  Goal: Tissue integrity - skin and mucous membranes  Description  Structural intactness and normal physiological function of skin and  mucous membranes.   Outcome: Met This Shift

## 2019-09-23 NOTE — CONSULTS
BLINDNESS    Colon Cancer Sister     Diabetes Paternal Aunt     Diabetes Paternal Uncle     Diabetes Paternal Aunt     Diabetes Paternal Uncle    . Otherwise non-pertinent to the chief complaint. REVIEW OF SYSTEMS:    14 ROS negative unless otherwise specified in the HPI    PHYSICAL EXAM:    Vitals:    BP (!) 170/81   Pulse 108   Temp 98 °F (36.7 °C) (Oral)   Resp 18   Ht 5' 3\" (1.6 m)   Wt (!) 320 lb 2 oz (145.2 kg)   LMP  (LMP Unknown)   SpO2 94%   BMI 56.71 kg/m²     General Appearance:    Alert, cooperative, no distress, appears stated age   Head:    Normocephalic, without obvious abnormality, atraumatic   Eyes:    PERRL, conjunctiva/corneas clear      Ears:    Normal and external ear canals, both ears   Nose:   Nares normal, septum midline, mucosa normal, no drainage    or sinus tenderness   Throat:   Lips, mucosa, and tongue normal; teeth and gums normal   Neck:   Supple, trachea midline, no adenopathy; no JVD   Lungs:     Clear to auscultation bilaterally, respirations unlabored   Chest wall:    No tenderness or deformity   Heart:    Regular rate and rhythm, S1 and S2 normal, no murmur, rub   or gallop   Abdomen:     Soft, non-tender, bowel sounds active all four quadrants,     no masses, no organomegaly   Extremities:  Lower extremity lymphedema with chronic venous stasis dermatitis and hyperkeratosis. Mild redness worse on the left than right leg. Tinea pedis and onychomycosis of both feet. Pulses:   2+ and symmetric all extremities   Skin:  Candida intertrigo       CBC+dif:  Reviewed and trend followed    Radiology:  Noted    Microbiology:  Pending    Assessment:  · Left more than right lower extremity cellulitis-this is not severe and could be treated with oral antibiotics  · Chronic bilateral lower extremity lymphedema and venous stasis dermatitis  · Onychomycosis and tinea pedis  · Candida intertrigo    Plan:    · IV cefazolin and doxycycline p.o. Could be discharged on p.o.

## 2019-09-23 NOTE — ED PROVIDER NOTES
Patient is a 70years old female with history of severe BLE lymphadenopathy, CHF, A. fib, and on Eliquis here due to discharge from her lower legs that was noticed by her family member. Patient states her family member noticed whitish discharge from her lower legs. She denies any symptoms. She denies pain, numbness/tingling, weakness, fever, chills, sweats, falls, injuries, chest pain/pressure, shortness of breath, lightheadedness, abdominal pain, nausea/vomiting, diarrhea, constipation, blood in stool or urine, burning with urination, urinary frequency changes, headache, neck pain/stiffness. Patient states she is on continuous 4L O2 at home. She denies recent use of ABx. Illness    Associated symptoms include rash. Pertinent negatives include no fever, no decreased vision, no abdominal pain, no constipation, no diarrhea, no nausea, no vomiting, no ear pain, no headaches, no hearing loss, no rhinorrhea, no sore throat, no stridor, no neck pain, no cough, no wheezing, no eye discharge, no eye pain and no eye redness. Review of Systems   Constitutional: Negative for chills, diaphoresis and fever. HENT: Negative for ear pain, hearing loss, rhinorrhea, sinus pressure and sore throat. Eyes: Negative for pain, discharge and redness. Respiratory: Negative for cough, shortness of breath, wheezing and stridor. Cardiovascular: Positive for leg swelling. Negative for chest pain. Gastrointestinal: Negative for abdominal distention, abdominal pain, constipation, diarrhea, nausea and vomiting. Genitourinary: Negative for dysuria, flank pain, frequency and hematuria. Musculoskeletal: Negative for arthralgias, back pain, neck pain and neck stiffness. Skin: Positive for rash and wound. Neurological: Negative for dizziness, syncope, weakness, light-headedness, numbness and headaches. Hematological: Negative for adenopathy. All other systems reviewed and are negative.        Physical Exam aunt, paternal uncle, and paternal uncle; Heart Disease in her mother; Other in her father. The patients home medications have been reviewed. Allergies: Statins    -------------------------------------------------- RESULTS -------------------------------------------------    LABS:  Results for orders placed or performed during the hospital encounter of 09/22/19   CBC Auto Differential   Result Value Ref Range    WBC 6.6 4.5 - 11.5 E9/L    RBC 3.34 (L) 3.50 - 5.50 E12/L    Hemoglobin 8.5 (L) 11.5 - 15.5 g/dL    Hematocrit 28.9 (L) 34.0 - 48.0 %    MCV 86.5 80.0 - 99.9 fL    MCH 25.4 (L) 26.0 - 35.0 pg    MCHC 29.4 (L) 32.0 - 34.5 %    RDW 15.4 (H) 11.5 - 15.0 fL    Platelets 146 787 - 676 E9/L    MPV 9.1 7.0 - 12.0 fL    Neutrophils % 70.6 43.0 - 80.0 %    Immature Granulocytes % 0.5 0.0 - 5.0 %    Lymphocytes % 18.9 (L) 20.0 - 42.0 %    Monocytes % 6.6 2.0 - 12.0 %    Eosinophils % 2.9 0.0 - 6.0 %    Basophils % 0.5 0.0 - 2.0 %    Neutrophils Absolute 4.68 1.80 - 7.30 E9/L    Immature Granulocytes # 0.03 E9/L    Lymphocytes Absolute 1.25 (L) 1.50 - 4.00 E9/L    Monocytes Absolute 0.44 0.10 - 0.95 E9/L    Eosinophils Absolute 0.19 0.05 - 0.50 E9/L    Basophils Absolute 0.03 0.00 - 0.20 M9/H   Basic Metabolic Panel   Result Value Ref Range    Sodium 139 132 - 146 mmol/L    Potassium 4.4 3.5 - 5.0 mmol/L    Chloride 97 (L) 98 - 107 mmol/L    CO2 33 (H) 22 - 29 mmol/L    Anion Gap 9 7 - 16 mmol/L    Glucose 157 (H) 74 - 99 mg/dL    BUN 17 8 - 23 mg/dL    CREATININE 0.9 0.5 - 1.0 mg/dL    GFR Non-African American >60 >=60 mL/min/1.73    GFR African American >60     Calcium 9.6 8.6 - 10.2 mg/dL   Lactate, Sepsis   Result Value Ref Range    Lactic Acid, Sepsis 1.3 0.5 - 1.9 mmol/L       RADIOLOGY:  XR TIBIA FIBULA LEFT (2 VIEWS)   Final Result   Diffuse soft tissue swelling in the left lower extremity which   correlates with a history of infraglottic edema. Presence of a left knee prosthesis.  The prosthesis in

## 2019-09-23 NOTE — CARE COORDINATION
Social Work / discharge planning    9/23/2019 11:28 AM     Patient lives with her family in 1 story home and ambulates with wheeled walker. .  Patient has 4 liters oxygen and nebulizer at home. Been to [facility] in past  Had [facility] in past.   Patient states the plan is home. Patient denies any needs at this time.   Sw to follow     Electronically signed by JADEN Jacobson on 9/23/2019 at 11:30 AM

## 2019-09-24 VITALS
RESPIRATION RATE: 20 BRPM | WEIGHT: 293 LBS | SYSTOLIC BLOOD PRESSURE: 163 MMHG | HEIGHT: 63 IN | OXYGEN SATURATION: 97 % | TEMPERATURE: 98.2 F | HEART RATE: 90 BPM | DIASTOLIC BLOOD PRESSURE: 69 MMHG | BODY MASS INDEX: 51.91 KG/M2

## 2019-09-24 LAB
ALBUMIN SERPL-MCNC: 4.5 G/DL (ref 3.5–5.2)
ALP BLD-CCNC: 72 U/L (ref 35–104)
ALT SERPL-CCNC: 14 U/L (ref 0–32)
ANION GAP SERPL CALCULATED.3IONS-SCNC: 9 MMOL/L (ref 7–16)
AST SERPL-CCNC: 17 U/L (ref 0–31)
BASOPHILS ABSOLUTE: 0.04 E9/L (ref 0–0.2)
BASOPHILS RELATIVE PERCENT: 0.5 % (ref 0–2)
BILIRUB SERPL-MCNC: 0.2 MG/DL (ref 0–1.2)
BUN BLDV-MCNC: 23 MG/DL (ref 8–23)
CALCIUM SERPL-MCNC: 9.7 MG/DL (ref 8.6–10.2)
CHLORIDE BLD-SCNC: 95 MMOL/L (ref 98–107)
CO2: 34 MMOL/L (ref 22–29)
CREAT SERPL-MCNC: 1.1 MG/DL (ref 0.5–1)
EOSINOPHILS ABSOLUTE: 0.31 E9/L (ref 0.05–0.5)
EOSINOPHILS RELATIVE PERCENT: 4.1 % (ref 0–6)
GFR AFRICAN AMERICAN: >60
GFR NON-AFRICAN AMERICAN: 50 ML/MIN/1.73
GLUCOSE BLD-MCNC: 103 MG/DL (ref 74–99)
HCT VFR BLD CALC: 32.8 % (ref 34–48)
HEMOGLOBIN: 9.7 G/DL (ref 11.5–15.5)
IMMATURE GRANULOCYTES #: 0.04 E9/L
IMMATURE GRANULOCYTES %: 0.5 % (ref 0–5)
LYMPHOCYTES ABSOLUTE: 1.31 E9/L (ref 1.5–4)
LYMPHOCYTES RELATIVE PERCENT: 17.2 % (ref 20–42)
MCH RBC QN AUTO: 25.7 PG (ref 26–35)
MCHC RBC AUTO-ENTMCNC: 29.6 % (ref 32–34.5)
MCV RBC AUTO: 87 FL (ref 80–99.9)
METER GLUCOSE: 112 MG/DL (ref 74–99)
METER GLUCOSE: 137 MG/DL (ref 74–99)
METER GLUCOSE: 248 MG/DL (ref 74–99)
MONOCYTES ABSOLUTE: 0.51 E9/L (ref 0.1–0.95)
MONOCYTES RELATIVE PERCENT: 6.7 % (ref 2–12)
NEUTROPHILS ABSOLUTE: 5.41 E9/L (ref 1.8–7.3)
NEUTROPHILS RELATIVE PERCENT: 71 % (ref 43–80)
PDW BLD-RTO: 15.3 FL (ref 11.5–15)
PLATELET # BLD: 258 E9/L (ref 130–450)
PMV BLD AUTO: 9.4 FL (ref 7–12)
POTASSIUM REFLEX MAGNESIUM: 4.6 MMOL/L (ref 3.5–5)
RBC # BLD: 3.77 E12/L (ref 3.5–5.5)
SODIUM BLD-SCNC: 138 MMOL/L (ref 132–146)
TOTAL PROTEIN: 7.4 G/DL (ref 6.4–8.3)
WBC # BLD: 7.6 E9/L (ref 4.5–11.5)

## 2019-09-24 PROCEDURE — 82962 GLUCOSE BLOOD TEST: CPT

## 2019-09-24 PROCEDURE — 99239 HOSP IP/OBS DSCHRG MGMT >30: CPT | Performed by: INTERNAL MEDICINE

## 2019-09-24 PROCEDURE — 2700000000 HC OXYGEN THERAPY PER DAY

## 2019-09-24 PROCEDURE — 6370000000 HC RX 637 (ALT 250 FOR IP): Performed by: INTERNAL MEDICINE

## 2019-09-24 PROCEDURE — 80053 COMPREHEN METABOLIC PANEL: CPT

## 2019-09-24 PROCEDURE — 6360000002 HC RX W HCPCS: Performed by: INTERNAL MEDICINE

## 2019-09-24 PROCEDURE — 85025 COMPLETE CBC W/AUTO DIFF WBC: CPT

## 2019-09-24 PROCEDURE — 2500000003 HC RX 250 WO HCPCS: Performed by: INTERNAL MEDICINE

## 2019-09-24 PROCEDURE — 36415 COLL VENOUS BLD VENIPUNCTURE: CPT

## 2019-09-24 PROCEDURE — 2580000003 HC RX 258: Performed by: INTERNAL MEDICINE

## 2019-09-24 RX ORDER — AMMONIUM LACTATE 12 G/100G
LOTION TOPICAL
Qty: 1 BOTTLE | Refills: 0 | Status: SHIPPED | OUTPATIENT
Start: 2019-09-25 | End: 2019-11-22

## 2019-09-24 RX ADMIN — Medication 10 ML: at 09:01

## 2019-09-24 RX ADMIN — CEFAZOLIN SODIUM 2 G: 2 SOLUTION INTRAVENOUS at 05:34

## 2019-09-24 RX ADMIN — OXYCODONE HYDROCHLORIDE AND ACETAMINOPHEN 1 TABLET: 5; 325 TABLET ORAL at 11:50

## 2019-09-24 RX ADMIN — METOPROLOL SUCCINATE 100 MG: 100 TABLET, EXTENDED RELEASE ORAL at 09:00

## 2019-09-24 RX ADMIN — MICONAZOLE NITRATE: 20 POWDER TOPICAL at 09:01

## 2019-09-24 RX ADMIN — APIXABAN 5 MG: 5 TABLET, FILM COATED ORAL at 09:01

## 2019-09-24 RX ADMIN — GABAPENTIN 300 MG: 300 CAPSULE ORAL at 09:01

## 2019-09-24 RX ADMIN — FAMOTIDINE 20 MG: 10 INJECTION, SOLUTION INTRAVENOUS at 09:00

## 2019-09-24 RX ADMIN — OXYCODONE HYDROCHLORIDE 2.5 MG: 5 TABLET ORAL at 05:31

## 2019-09-24 RX ADMIN — SPIRONOLACTONE 25 MG: 25 TABLET ORAL at 09:00

## 2019-09-24 RX ADMIN — DOXYCYCLINE HYCLATE 100 MG: 100 CAPSULE ORAL at 09:00

## 2019-09-24 RX ADMIN — INSULIN LISPRO 4 UNITS: 100 INJECTION, SOLUTION INTRAVENOUS; SUBCUTANEOUS at 11:54

## 2019-09-24 RX ADMIN — GABAPENTIN 300 MG: 300 CAPSULE ORAL at 13:31

## 2019-09-24 RX ADMIN — OXYCODONE HYDROCHLORIDE AND ACETAMINOPHEN 1 TABLET: 5; 325 TABLET ORAL at 05:31

## 2019-09-24 RX ADMIN — OXYCODONE HYDROCHLORIDE 2.5 MG: 5 TABLET ORAL at 11:49

## 2019-09-24 RX ADMIN — INSULIN LISPRO 70 UNITS: 100 INJECTION, SOLUTION INTRAVENOUS; SUBCUTANEOUS at 11:51

## 2019-09-24 RX ADMIN — CEFAZOLIN SODIUM 2 G: 2 SOLUTION INTRAVENOUS at 13:30

## 2019-09-24 RX ADMIN — BUMETANIDE 1 MG: 1 TABLET ORAL at 09:00

## 2019-09-24 RX ADMIN — DILTIAZEM HYDROCHLORIDE 180 MG: 180 CAPSULE, COATED, EXTENDED RELEASE ORAL at 09:01

## 2019-09-24 ASSESSMENT — PAIN SCALES - GENERAL
PAINLEVEL_OUTOF10: 0
PAINLEVEL_OUTOF10: 7
PAINLEVEL_OUTOF10: 7

## 2019-09-24 NOTE — DISCHARGE SUMMARY
care clinic on 9/30/2019. Patient's home regimen for diabetes was continued, medications for hypertension, CHF, atrial fibrillation were continued during her hospital stay. Patient was also given topical clotrimazole for the toes and miconazole powder. Had improved on IV cefazolin and p.o. Doxycycline, was tolerating p.o. Intake, had stable vital signs and was discharged in a stable condition on p.o. doxycycline and Keflex for 10 days with recommendation for close follow-up with primary care doctor and wound care clinic on 9/30/2019. All the questions were answered. Discharge Exam:  Vitals:    09/23/19 2000 09/23/19 2052 09/24/19 0031 09/24/19 0730   BP: (!) 166/88 (!) 166/88  (!) 163/69   Pulse: 87 87  90   Resp: 18   20   Temp: 98.5 °F (36.9 °C)   98.2 °F (36.8 °C)   TempSrc: Oral   Oral   SpO2: 96%   97%   Weight:   (!) 321 lb 7 oz (145.8 kg)    Height:           General Appearance: alert and oriented to person, place and time, morbidly obese, 4 L of nasal cannula which is her baseline. Skin: warm and dry, no rash or erythema  Head: normocephalic and atraumatic  Eyes: pupils equal, round, and reactive to light, extraocular eye movements intact, conjunctivae normal  ENT: tympanic membrane, external ear and ear canal normal bilaterally, nose without deformity, nasal mucosa and turbinates normal without polyps  Neck: supple and non-tender without mass, no thyromegaly or thyroid nodules, no cervical lymphadenopathy  Pulmonary/Chest: clear to auscultation bilaterally- no wheezes, rales or rhonchi, normal air movement, no respiratory distress  Cardiovascular: normal rate, regular rhythm, normal S1 and S2, no murmurs, rubs, clicks, or gallops, distal pulses intact, no carotid bruits  Abdomen: soft, non-tender, non-distended, normal bowel sounds, no masses or organomegaly  Extremities: no cyanosis, clubbing, lower extremities with lymphedema with venous stasis wounds, erythema and tenderness.  Lichenification present on both the legs scabbing seen on the legs bilaterally, no active drainage seen. Musculoskeletal: normal range of motion, no joint swelling, deformity or tenderness  Neurologic: Grossly intact  I/O last 3 completed shifts: In: 360 [P.O.:360]  Out: -   No intake/output data recorded. LABS:  Recent Labs     09/22/19 2152 09/23/19 0605 09/24/19  0520    138 138   K 4.4 4.6 4.6   CL 97* 96* 95*   CO2 33* 31* 34*   BUN 17 17 23   CREATININE 0.9 0.9 1.1*   GLUCOSE 157* 261* 103*   CALCIUM 9.6 9.4 9.7       Recent Labs     09/22/19 2152 09/23/19 0605 09/24/19  0520   WBC 6.6 7.3 7.6   RBC 3.34* 3.51 3.77   HGB 8.5* 9.1* 9.7*   HCT 28.9* 30.5* 32.8*   MCV 86.5 86.9 87.0   MCH 25.4* 25.9* 25.7*   MCHC 29.4* 29.8* 29.6*   RDW 15.4* 15.4* 15.3*    239 258   MPV 9.1 9.3 9.4       No results for input(s): POCGLU in the last 72 hours.     CBC with Differential:    Lab Results   Component Value Date    WBC 7.6 09/24/2019    RBC 3.77 09/24/2019    HGB 9.7 09/24/2019    HCT 32.8 09/24/2019     09/24/2019    MCV 87.0 09/24/2019    MCH 25.7 09/24/2019    MCHC 29.6 09/24/2019    RDW 15.3 09/24/2019    NRBC 0.0 12/04/2018    SEGSPCT 85 01/29/2014    METASPCT 3.5 12/04/2018    LYMPHOPCT 17.2 09/24/2019    MONOPCT 6.7 09/24/2019    MYELOPCT 2.6 12/04/2018    BASOPCT 0.5 09/24/2019    MONOSABS 0.51 09/24/2019    LYMPHSABS 1.31 09/24/2019    EOSABS 0.31 09/24/2019    BASOSABS 0.04 09/24/2019     WBC:    Lab Results   Component Value Date    WBC 7.6 09/24/2019     BMP:    Lab Results   Component Value Date     09/24/2019    K 4.6 09/24/2019    CL 95 09/24/2019    CO2 34 09/24/2019    BUN 23 09/24/2019    LABALBU 4.5 09/24/2019    LABALBU 4.5 11/02/2011    CREATININE 1.1 09/24/2019    CALCIUM 9.7 09/24/2019    GFRAA >60 09/24/2019    LABGLOM 50 09/24/2019    GLUCOSE 103 09/24/2019    GLUCOSE 181 12/16/2011     HgBA1c:    Lab Results   Component Value Date    LABA1C 8.6 09/23/2019     Wound culture,

## 2019-09-24 NOTE — PROGRESS NOTES
CLINICAL PHARMACY NOTE: MEDS TO [address] Select Patient?: Yes  Total # of Prescriptions Filled: 1   The following medications were delivered to the patient:  · Ammonium lactate 12% lotion     Total # of Interventions Completed: 2  Time Spent (min): 30    Additional Documentation:
Patient is a 70-year-old morbidly obese female with a history of A. fib on Eliquis, COPD, CHF, GERD, diabetes, hypertension hyperlipidemia who was admitted early in the morning with bilateral lower extremity swelling and pain. Patient also has history of lymphedema and venous stasis, has been initiated on IV antibiotics. Infectious disease and wound care have been consulted, will appreciate recs, when seen patient was sitting comfortably in her bed with her home oxygen that is 4 L. PT and OT ordered, patient doing better.       [address]
Short Term/ Long Term   Goals   Was pt agreeable to Eval/treatment? yes       Does pt have pain? Denies        Bed Mobility  Rolling: NT  Supine to sit: NT  Sit to supine: NT  Scooting: min  Assist in sit    Min assist   Transfers Sit to stand: min assist  Stand to sit: min assist  Stand pivot: NT   SBA   Ambulation    15 feet x 1 and 40 feet x 1 with ww SBA/CGA    50 feet with ww with S/I   Stair Negotiation  Ascended and descended  NT       AM-PAC Raw score               16/24          Pt is alert and Oriented     LE ROM: grossly WFL  LE strength: 3- to 4-/ 5     Balance: fair, SBA/CGA with gait, no LOB     Edema: B LEs  Endurance: decreased     ASSESSMENT    Pt displays functional ability as noted in the objective portion of this evaluation.       Comments/Treatment:  Pt fatigues quickly . Cues for hand placement with transfers. Left in chair with call light in reach         Patient education  Pt educated on transfer technique, ww safety, proper breathing technique      Patient response to education:   Pt verbalized understanding Pt demonstrated skill Pt requires further education in this area   no With cues  yes      Rehab potential is good for reaching above PT goals.       Pts/ family goals     1. none stated      Patient and or family understand(s) diagnosis, prognosis, and plan of care. - yes     PLAN    PT care will be provided in accordance with the objectives noted above. Whenever appropriate, clear delegation orders will be provided for nursing staff. Exercises and functional mobility practice will be used as well as appropriate assistive devices or modalities to obtain goals. Patient and family education will also be administered as needed.     Frequency of treatments will be 2-3x/week x 5 days.     Time in : 1628  Time out : [facility]   PT 2223    
Delirium Prevention/Treatment  []  Other:  []    Rehab Potential: Good for established goals. Patient / Family Goal: Patient did not state a goal.  Patient and/or family were instructed on functional diagnosis, prognosis/goals, and OT plan of care. Demonstrated Good understanding. Low complexity evaluation + 0 timed treatment minutes  Time Out: 1639    Evaluation time includes thorough review of current medical information, gathering information on past medical history/social history and prior level of function, completion of standardized testing/informal observation of tasks, assessment of data, and development of POC/Goals. Christy Poole Stands, OTR/L  License Number: [id number]

## 2019-09-27 ENCOUNTER — TELEPHONE (OUTPATIENT)
Dept: FAMILY MEDICINE CLINIC | Age: 64
End: 2019-09-27

## 2019-09-27 DIAGNOSIS — E11.40 TYPE 2 DIABETES MELLITUS WITH DIABETIC NEUROPATHY, WITH LONG-TERM CURRENT USE OF INSULIN (HCC): Primary | Chronic | ICD-10-CM

## 2019-09-27 DIAGNOSIS — Z79.4 TYPE 2 DIABETES MELLITUS WITH DIABETIC NEUROPATHY, WITH LONG-TERM CURRENT USE OF INSULIN (HCC): Primary | Chronic | ICD-10-CM

## 2019-09-28 LAB
BLOOD CULTURE, ROUTINE: NORMAL
CULTURE, BLOOD 2: NORMAL
WOUND/ABSCESS: NORMAL

## 2019-09-30 ENCOUNTER — HOSPITAL ENCOUNTER (OUTPATIENT)
Dept: WOUND CARE | Age: 64
Discharge: HOME | End: 2019-09-30
Payer: MEDICAID

## 2019-09-30 VITALS
HEIGHT: 63 IN | SYSTOLIC BLOOD PRESSURE: 132 MMHG | WEIGHT: 293 LBS | DIASTOLIC BLOOD PRESSURE: 60 MMHG | RESPIRATION RATE: 16 BRPM | HEART RATE: 84 BPM | TEMPERATURE: 98.2 F | BODY MASS INDEX: 51.91 KG/M2

## 2019-09-30 DIAGNOSIS — E11.40 TYPE 2 DIABETES MELLITUS WITH DIABETIC NEUROPATHY, WITH LONG-TERM CURRENT USE OF INSULIN (HCC): Chronic | ICD-10-CM

## 2019-09-30 DIAGNOSIS — Z79.4 TYPE 2 DIABETES MELLITUS WITH DIABETIC NEUROPATHY, WITH LONG-TERM CURRENT USE OF INSULIN (HCC): Chronic | ICD-10-CM

## 2019-09-30 PROCEDURE — 11042 DBRDMT SUBQ TIS 1ST 20SQCM/<: CPT

## 2019-09-30 PROCEDURE — 99213 OFFICE O/P EST LOW 20 MIN: CPT

## 2019-09-30 ASSESSMENT — PAIN DESCRIPTION - PAIN TYPE: TYPE: CHRONIC PAIN

## 2019-09-30 ASSESSMENT — PAIN DESCRIPTION - DESCRIPTORS: DESCRIPTORS: ACHING

## 2019-09-30 ASSESSMENT — PAIN DESCRIPTION - FREQUENCY: FREQUENCY: CONTINUOUS

## 2019-09-30 ASSESSMENT — PAIN DESCRIPTION - PROGRESSION: CLINICAL_PROGRESSION: NOT CHANGED

## 2019-09-30 ASSESSMENT — PAIN DESCRIPTION - ONSET: ONSET: ON-GOING

## 2019-09-30 ASSESSMENT — PAIN DESCRIPTION - LOCATION: LOCATION: FOOT

## 2019-09-30 ASSESSMENT — PAIN - FUNCTIONAL ASSESSMENT: PAIN_FUNCTIONAL_ASSESSMENT: PREVENTS OR INTERFERES SOME ACTIVE ACTIVITIES AND ADLS

## 2019-09-30 ASSESSMENT — PAIN DESCRIPTION - ORIENTATION: ORIENTATION: LEFT;RIGHT

## 2019-09-30 ASSESSMENT — PAIN SCALES - GENERAL: PAINLEVEL_OUTOF10: 4

## 2019-10-07 ENCOUNTER — HOSPITAL ENCOUNTER (OUTPATIENT)
Dept: WOUND CARE | Age: 64
Discharge: HOME | End: 2019-10-07
Payer: MEDICAID

## 2019-10-07 VITALS
SYSTOLIC BLOOD PRESSURE: 128 MMHG | HEART RATE: 80 BPM | DIASTOLIC BLOOD PRESSURE: 68 MMHG | TEMPERATURE: 98.1 F | WEIGHT: 293 LBS | BODY MASS INDEX: 51.91 KG/M2 | HEIGHT: 63 IN | RESPIRATION RATE: 20 BRPM

## 2019-10-07 PROCEDURE — 11042 DBRDMT SUBQ TIS 1ST 20SQCM/<: CPT

## 2019-10-07 RX ORDER — LIDOCAINE HYDROCHLORIDE 20 MG/ML
JELLY TOPICAL ONCE
Status: DISCONTINUED | OUTPATIENT
Start: 2019-10-07 | End: 2019-10-08 | Stop reason: HOSPADM

## 2019-10-10 RX ORDER — GABAPENTIN 100 MG/1
300 CAPSULE ORAL 3 TIMES DAILY
Qty: 90 CAPSULE | Refills: 0 | Status: SHIPPED | OUTPATIENT
Start: 2019-10-10 | End: 2019-10-25 | Stop reason: SDUPTHER

## 2019-10-13 DIAGNOSIS — E61.1 IRON DEFICIENCY: ICD-10-CM

## 2019-10-14 DIAGNOSIS — I10 ESSENTIAL HYPERTENSION: ICD-10-CM

## 2019-10-15 RX ORDER — APIXABAN 5 MG/1
TABLET, FILM COATED ORAL
Qty: 60 TABLET | Refills: 0 | Status: SHIPPED | OUTPATIENT
Start: 2019-10-15 | End: 2019-11-16 | Stop reason: SDUPTHER

## 2019-10-15 RX ORDER — FERROUS SULFATE 325(65) MG
TABLET ORAL
Qty: 30 TABLET | Refills: 0 | Status: SHIPPED | OUTPATIENT
Start: 2019-10-15 | End: 2019-11-22 | Stop reason: SDUPTHER

## 2019-10-15 RX ORDER — DILTIAZEM HYDROCHLORIDE 240 MG/1
CAPSULE, COATED, EXTENDED RELEASE ORAL
Qty: 60 CAPSULE | Refills: 0 | Status: ON HOLD | OUTPATIENT
Start: 2019-10-15 | End: 2019-12-27 | Stop reason: HOSPADM

## 2019-10-16 DIAGNOSIS — J45.901 ASTHMA EXACERBATION: ICD-10-CM

## 2019-10-16 RX ORDER — IPRATROPIUM BROMIDE AND ALBUTEROL SULFATE 2.5; .5 MG/3ML; MG/3ML
1 SOLUTION RESPIRATORY (INHALATION) EVERY 4 HOURS PRN
Qty: 4 VIAL | Refills: 2 | Status: SHIPPED | OUTPATIENT
Start: 2019-10-16 | End: 2020-01-21

## 2019-10-24 ENCOUNTER — APPOINTMENT (OUTPATIENT)
Dept: GENERAL RADIOLOGY | Age: 64
End: 2019-10-24
Payer: MEDICAID

## 2019-10-24 ENCOUNTER — HOSPITAL ENCOUNTER (EMERGENCY)
Age: 64
Discharge: HOME | End: 2019-10-24
Attending: EMERGENCY MEDICINE
Payer: MEDICAID

## 2019-10-24 VITALS
HEART RATE: 80 BPM | BODY MASS INDEX: 51.91 KG/M2 | TEMPERATURE: 98.9 F | OXYGEN SATURATION: 96 % | WEIGHT: 293 LBS | HEIGHT: 63 IN | SYSTOLIC BLOOD PRESSURE: 135 MMHG | DIASTOLIC BLOOD PRESSURE: 63 MMHG | RESPIRATION RATE: 18 BRPM

## 2019-10-24 DIAGNOSIS — Z79.4 TYPE 2 DIABETES MELLITUS WITH DIABETIC NEUROPATHY, WITH LONG-TERM CURRENT USE OF INSULIN (HCC): Chronic | ICD-10-CM

## 2019-10-24 DIAGNOSIS — R06.00 DYSPNEA, UNSPECIFIED TYPE: Primary | ICD-10-CM

## 2019-10-24 DIAGNOSIS — E11.40 TYPE 2 DIABETES MELLITUS WITH DIABETIC NEUROPATHY, WITH LONG-TERM CURRENT USE OF INSULIN (HCC): Chronic | ICD-10-CM

## 2019-10-24 LAB
ALBUMIN SERPL-MCNC: 4.3 G/DL (ref 3.5–5.2)
ALP BLD-CCNC: 69 U/L (ref 35–104)
ALT SERPL-CCNC: 13 U/L (ref 0–32)
ANION GAP SERPL CALCULATED.3IONS-SCNC: 10 MMOL/L (ref 7–16)
AST SERPL-CCNC: 14 U/L (ref 0–31)
BASOPHILS ABSOLUTE: 0.03 E9/L (ref 0–0.2)
BASOPHILS RELATIVE PERCENT: 0.5 % (ref 0–2)
BILIRUB SERPL-MCNC: <0.2 MG/DL (ref 0–1.2)
BUN BLDV-MCNC: 18 MG/DL (ref 8–23)
CALCIUM SERPL-MCNC: 9.6 MG/DL (ref 8.6–10.2)
CHLORIDE BLD-SCNC: 96 MMOL/L (ref 98–107)
CO2: 32 MMOL/L (ref 22–29)
CREAT SERPL-MCNC: 0.9 MG/DL (ref 0.5–1)
EOSINOPHILS ABSOLUTE: 0.18 E9/L (ref 0.05–0.5)
EOSINOPHILS RELATIVE PERCENT: 2.9 % (ref 0–6)
GFR AFRICAN AMERICAN: >60
GFR NON-AFRICAN AMERICAN: >60 ML/MIN/1.73
GLUCOSE BLD-MCNC: 269 MG/DL (ref 74–99)
HCT VFR BLD CALC: 32.4 % (ref 34–48)
HEMOGLOBIN: 10.1 G/DL (ref 11.5–15.5)
IMMATURE GRANULOCYTES #: 0.03 E9/L
IMMATURE GRANULOCYTES %: 0.5 % (ref 0–5)
LIPASE: 21 U/L (ref 13–60)
LYMPHOCYTES ABSOLUTE: 1.52 E9/L (ref 1.5–4)
LYMPHOCYTES RELATIVE PERCENT: 24.6 % (ref 20–42)
MCH RBC QN AUTO: 25.9 PG (ref 26–35)
MCHC RBC AUTO-ENTMCNC: 31.2 % (ref 32–34.5)
MCV RBC AUTO: 83.1 FL (ref 80–99.9)
MONOCYTES ABSOLUTE: 0.42 E9/L (ref 0.1–0.95)
MONOCYTES RELATIVE PERCENT: 6.8 % (ref 2–12)
NEUTROPHILS ABSOLUTE: 4 E9/L (ref 1.8–7.3)
NEUTROPHILS RELATIVE PERCENT: 64.7 % (ref 43–80)
PDW BLD-RTO: 15.2 FL (ref 11.5–15)
PLATELET # BLD: 189 E9/L (ref 130–450)
PMV BLD AUTO: 10.1 FL (ref 7–12)
POTASSIUM SERPL-SCNC: 4.3 MMOL/L (ref 3.5–5)
PRO-BNP: 243 PG/ML (ref 0–125)
RBC # BLD: 3.9 E12/L (ref 3.5–5.5)
SODIUM BLD-SCNC: 138 MMOL/L (ref 132–146)
TOTAL PROTEIN: 6.8 G/DL (ref 6.4–8.3)
TROPONIN: <0.01 NG/ML (ref 0–0.03)
WBC # BLD: 6.2 E9/L (ref 4.5–11.5)

## 2019-10-24 PROCEDURE — 99285 EMERGENCY DEPT VISIT HI MDM: CPT

## 2019-10-24 PROCEDURE — 71045 X-RAY EXAM CHEST 1 VIEW: CPT

## 2019-10-24 PROCEDURE — 85025 COMPLETE CBC W/AUTO DIFF WBC: CPT

## 2019-10-24 PROCEDURE — 83690 ASSAY OF LIPASE: CPT

## 2019-10-24 PROCEDURE — 84484 ASSAY OF TROPONIN QUANT: CPT

## 2019-10-24 PROCEDURE — 93005 ELECTROCARDIOGRAM TRACING: CPT | Performed by: EMERGENCY MEDICINE

## 2019-10-24 PROCEDURE — 83880 ASSAY OF NATRIURETIC PEPTIDE: CPT

## 2019-10-24 PROCEDURE — 80053 COMPREHEN METABOLIC PANEL: CPT

## 2019-10-24 RX ORDER — GUAIFENESIN AND DEXTROMETHORPHAN HYDROBROMIDE 1200; 60 MG/1; MG/1
1 TABLET, EXTENDED RELEASE ORAL 2 TIMES DAILY PRN
Qty: 28 TABLET | Refills: 0 | Status: SHIPPED | OUTPATIENT
Start: 2019-10-24 | End: 2019-11-22

## 2019-10-24 RX ORDER — ALBUTEROL SULFATE 90 UG/1
2 AEROSOL, METERED RESPIRATORY (INHALATION) EVERY 6 HOURS PRN
Qty: 1 INHALER | Refills: 0 | Status: SHIPPED | OUTPATIENT
Start: 2019-10-24 | End: 2019-12-19 | Stop reason: SDUPTHER

## 2019-10-24 ASSESSMENT — ENCOUNTER SYMPTOMS
SHORTNESS OF BREATH: 1
BACK PAIN: 0
ABDOMINAL PAIN: 0
NAUSEA: 0

## 2019-10-25 LAB
EKG ATRIAL RATE: 77 BPM
EKG P AXIS: 78 DEGREES
EKG P-R INTERVAL: 146 MS
EKG Q-T INTERVAL: 400 MS
EKG QRS DURATION: 118 MS
EKG QTC CALCULATION (BAZETT): 452 MS
EKG R AXIS: 82 DEGREES
EKG T AXIS: 57 DEGREES
EKG VENTRICULAR RATE: 77 BPM

## 2019-10-25 PROCEDURE — 93010 ELECTROCARDIOGRAM REPORT: CPT | Performed by: INTERNAL MEDICINE

## 2019-11-07 ENCOUNTER — HOSPITAL ENCOUNTER (OUTPATIENT)
Age: 64
Discharge: HOME | End: 2019-11-09
Payer: MEDICAID

## 2019-11-07 ENCOUNTER — OFFICE VISIT (OUTPATIENT)
Dept: FAMILY MEDICINE CLINIC | Age: 64
End: 2019-11-07
Payer: MEDICAID

## 2019-11-07 DIAGNOSIS — E11.65 UNCONTROLLED TYPE 2 DIABETES MELLITUS WITH HYPERGLYCEMIA (HCC): ICD-10-CM

## 2019-11-07 DIAGNOSIS — I89.0 LYMPHEDEMA OF BOTH LOWER EXTREMITIES: ICD-10-CM

## 2019-11-07 DIAGNOSIS — Z87.891 PERSONAL HISTORY OF TOBACCO USE: ICD-10-CM

## 2019-11-07 DIAGNOSIS — J44.9 CHRONIC OBSTRUCTIVE PULMONARY DISEASE, UNSPECIFIED COPD TYPE (HCC): ICD-10-CM

## 2019-11-07 DIAGNOSIS — Z23 NEED FOR INFLUENZA VACCINATION: ICD-10-CM

## 2019-11-07 DIAGNOSIS — E61.1 IRON DEFICIENCY: ICD-10-CM

## 2019-11-07 DIAGNOSIS — E11.40 TYPE 2 DIABETES MELLITUS WITH DIABETIC NEUROPATHY, WITH LONG-TERM CURRENT USE OF INSULIN (HCC): Primary | Chronic | ICD-10-CM

## 2019-11-07 DIAGNOSIS — Q21.12 PFO (PATENT FORAMEN OVALE): ICD-10-CM

## 2019-11-07 DIAGNOSIS — E11.42 DIABETIC POLYNEUROPATHY ASSOCIATED WITH TYPE 2 DIABETES MELLITUS (HCC): ICD-10-CM

## 2019-11-07 DIAGNOSIS — I82.5Z1 CHRONIC DEEP VEIN THROMBOSIS (DVT) OF DISTAL VEIN OF RIGHT LOWER EXTREMITY (HCC): ICD-10-CM

## 2019-11-07 DIAGNOSIS — I50.32 CHRONIC DIASTOLIC HEART FAILURE (HCC): ICD-10-CM

## 2019-11-07 DIAGNOSIS — Z79.4 TYPE 2 DIABETES MELLITUS WITH DIABETIC NEUROPATHY, WITH LONG-TERM CURRENT USE OF INSULIN (HCC): Primary | Chronic | ICD-10-CM

## 2019-11-07 LAB
BASOPHILS ABSOLUTE: 0.03 E9/L (ref 0–0.2)
BASOPHILS RELATIVE PERCENT: 0.5 % (ref 0–2)
EOSINOPHILS ABSOLUTE: 0.12 E9/L (ref 0.05–0.5)
EOSINOPHILS RELATIVE PERCENT: 2.2 % (ref 0–6)
HBA1C MFR BLD: 11.5 %
HCT VFR BLD CALC: 34.6 % (ref 34–48)
HEMOGLOBIN: 10.4 G/DL (ref 11.5–15.5)
IMMATURE GRANULOCYTES #: 0.02 E9/L
IMMATURE GRANULOCYTES %: 0.4 % (ref 0–5)
LYMPHOCYTES ABSOLUTE: 1.3 E9/L (ref 1.5–4)
LYMPHOCYTES RELATIVE PERCENT: 23.8 % (ref 20–42)
MCH RBC QN AUTO: 25.4 PG (ref 26–35)
MCHC RBC AUTO-ENTMCNC: 30.1 % (ref 32–34.5)
MCV RBC AUTO: 84.6 FL (ref 80–99.9)
MONOCYTES ABSOLUTE: 0.42 E9/L (ref 0.1–0.95)
MONOCYTES RELATIVE PERCENT: 7.7 % (ref 2–12)
NEUTROPHILS ABSOLUTE: 3.58 E9/L (ref 1.8–7.3)
NEUTROPHILS RELATIVE PERCENT: 65.4 % (ref 43–80)
PDW BLD-RTO: 15 FL (ref 11.5–15)
PLATELET # BLD: 191 E9/L (ref 130–450)
PMV BLD AUTO: 11.2 FL (ref 7–12)
RBC # BLD: 4.09 E12/L (ref 3.5–5.5)
WBC # BLD: 5.5 E9/L (ref 4.5–11.5)

## 2019-11-07 PROCEDURE — 99215 OFFICE O/P EST HI 40 MIN: CPT | Performed by: FAMILY MEDICINE

## 2019-11-07 PROCEDURE — 82306 VITAMIN D 25 HYDROXY: CPT

## 2019-11-07 PROCEDURE — 80053 COMPREHEN METABOLIC PANEL: CPT

## 2019-11-07 PROCEDURE — 84443 ASSAY THYROID STIM HORMONE: CPT

## 2019-11-07 PROCEDURE — 90471 IMMUNIZATION ADMIN: CPT | Performed by: FAMILY MEDICINE

## 2019-11-07 PROCEDURE — 80061 LIPID PANEL: CPT

## 2019-11-07 PROCEDURE — 85025 COMPLETE CBC W/AUTO DIFF WBC: CPT

## 2019-11-07 PROCEDURE — 83036 HEMOGLOBIN GLYCOSYLATED A1C: CPT | Performed by: FAMILY MEDICINE

## 2019-11-07 PROCEDURE — G0296 VISIT TO DETERM LDCT ELIG: HCPCS | Performed by: FAMILY MEDICINE

## 2019-11-07 PROCEDURE — 90662 IIV NO PRSV INCREASED AG IM: CPT | Performed by: FAMILY MEDICINE

## 2019-11-07 RX ORDER — TRAZODONE HYDROCHLORIDE 50 MG/1
50 TABLET ORAL NIGHTLY PRN
Qty: 30 TABLET | Refills: 5 | Status: SHIPPED
Start: 2019-11-07 | End: 2020-05-18

## 2019-11-07 RX ORDER — ALCOHOL ANTISEPTIC PADS
PADS, MEDICATED (EA) TOPICAL
Refills: 5 | COMMUNITY
Start: 2019-10-14 | End: 2020-11-17

## 2019-11-07 RX ORDER — PEN NEEDLE, DIABETIC 29 G X1/2"
NEEDLE, DISPOSABLE MISCELLANEOUS
Refills: 0 | COMMUNITY
Start: 2019-10-18 | End: 2020-11-17

## 2019-11-08 LAB
ALBUMIN SERPL-MCNC: 4.4 G/DL (ref 3.5–5.2)
ALP BLD-CCNC: 95 U/L (ref 35–104)
ALT SERPL-CCNC: 15 U/L (ref 0–32)
ANION GAP SERPL CALCULATED.3IONS-SCNC: 13 MMOL/L (ref 7–16)
AST SERPL-CCNC: 14 U/L (ref 0–31)
BILIRUB SERPL-MCNC: <0.2 MG/DL (ref 0–1.2)
BUN BLDV-MCNC: 21 MG/DL (ref 8–23)
CALCIUM SERPL-MCNC: 9.6 MG/DL (ref 8.6–10.2)
CHLORIDE BLD-SCNC: 93 MMOL/L (ref 98–107)
CHOLESTEROL, TOTAL: 318 MG/DL (ref 0–199)
CO2: 29 MMOL/L (ref 22–29)
CREAT SERPL-MCNC: 0.9 MG/DL (ref 0.5–1)
GFR AFRICAN AMERICAN: >60
GFR NON-AFRICAN AMERICAN: >60 ML/MIN/1.73
GLUCOSE BLD-MCNC: 336 MG/DL (ref 74–99)
HDLC SERPL-MCNC: 28 MG/DL
LDL CHOLESTEROL CALCULATED: ABNORMAL MG/DL (ref 0–99)
POTASSIUM SERPL-SCNC: 4.4 MMOL/L (ref 3.5–5)
SODIUM BLD-SCNC: 135 MMOL/L (ref 132–146)
TOTAL PROTEIN: 7.5 G/DL (ref 6.4–8.3)
TRIGL SERPL-MCNC: 861 MG/DL (ref 0–149)
TSH SERPL DL<=0.05 MIU/L-ACNC: 1.74 UIU/ML (ref 0.27–4.2)
VITAMIN D 25-HYDROXY: 9 NG/ML (ref 30–100)
VLDLC SERPL CALC-MCNC: ABNORMAL MG/DL

## 2019-11-12 VITALS
HEART RATE: 102 BPM | BODY MASS INDEX: 53.14 KG/M2 | RESPIRATION RATE: 20 BRPM | HEIGHT: 63 IN | SYSTOLIC BLOOD PRESSURE: 130 MMHG | TEMPERATURE: 98.1 F | DIASTOLIC BLOOD PRESSURE: 70 MMHG

## 2019-11-12 RX ORDER — ERGOCALCIFEROL 1.25 MG/1
50000 CAPSULE ORAL WEEKLY
Qty: 12 CAPSULE | Refills: 1 | Status: SHIPPED | OUTPATIENT
Start: 2019-11-12 | End: 2019-11-22 | Stop reason: SDUPTHER

## 2019-11-12 ASSESSMENT — ENCOUNTER SYMPTOMS
VOMITING: 0
EYE PAIN: 0
DIARRHEA: 0
BLOOD IN STOOL: 0
SINUS PAIN: 0
SORE THROAT: 0
NAUSEA: 0
COLOR CHANGE: 0
COUGH: 0
SINUS PRESSURE: 0
SHORTNESS OF BREATH: 0
ABDOMINAL PAIN: 0
BACK PAIN: 1
CONSTIPATION: 0

## 2019-11-18 RX ORDER — APIXABAN 5 MG/1
TABLET, FILM COATED ORAL
Qty: 60 TABLET | Refills: 0 | Status: SHIPPED | OUTPATIENT
Start: 2019-11-18 | End: 2019-11-27

## 2019-11-21 RX ORDER — GABAPENTIN 100 MG/1
CAPSULE ORAL
Qty: 270 CAPSULE | Refills: 0 | Status: SHIPPED | OUTPATIENT
Start: 2019-11-21 | End: 2019-12-13 | Stop reason: SDUPTHER

## 2019-11-22 DIAGNOSIS — E61.1 IRON DEFICIENCY: ICD-10-CM

## 2019-11-22 RX ORDER — ERGOCALCIFEROL 1.25 MG/1
50000 CAPSULE ORAL
Qty: 12 CAPSULE | Refills: 1 | Status: SHIPPED | OUTPATIENT
Start: 2019-11-25 | End: 2020-01-24

## 2019-11-22 RX ORDER — ICOSAPENT ETHYL 1000 MG/1
2 CAPSULE ORAL 2 TIMES DAILY
Qty: 60 CAPSULE | Refills: 3 | Status: SHIPPED
Start: 2019-11-22 | End: 2020-08-27 | Stop reason: SDUPTHER

## 2019-11-23 RX ORDER — FERROUS SULFATE 325(65) MG
TABLET ORAL
Qty: 30 TABLET | Refills: 0 | Status: SHIPPED | OUTPATIENT
Start: 2019-11-23 | End: 2020-01-24

## 2019-11-23 RX ORDER — APIXABAN 5 MG/1
TABLET, FILM COATED ORAL
Qty: 60 TABLET | Refills: 0 | Status: SHIPPED | OUTPATIENT
Start: 2019-11-23 | End: 2020-01-23 | Stop reason: SDUPTHER

## 2019-11-27 ENCOUNTER — TELEPHONE (OUTPATIENT)
Dept: FAMILY MEDICINE CLINIC | Age: 64
End: 2019-11-27

## 2019-11-27 RX ORDER — LANSOPRAZOLE 30 MG/1
30 CAPSULE, DELAYED RELEASE ORAL DAILY
Qty: 90 CAPSULE | Refills: 1 | Status: SHIPPED
Start: 2019-11-27 | End: 2020-08-27 | Stop reason: SDUPTHER

## 2019-11-27 RX ORDER — MONTELUKAST SODIUM 10 MG/1
10 TABLET ORAL NIGHTLY
Qty: 90 TABLET | Refills: 1 | Status: SHIPPED | OUTPATIENT
Start: 2019-11-27 | End: 2020-01-23 | Stop reason: SDUPTHER

## 2019-12-05 ENCOUNTER — CARE COORDINATION (OUTPATIENT)
Dept: CARE COORDINATION | Age: 64
End: 2019-12-05

## 2019-12-07 ENCOUNTER — HOSPITAL ENCOUNTER (EMERGENCY)
Age: 64
Discharge: HOME | End: 2019-12-07
Attending: EMERGENCY MEDICINE
Payer: MEDICAID

## 2019-12-07 ENCOUNTER — APPOINTMENT (OUTPATIENT)
Dept: GENERAL RADIOLOGY | Age: 64
End: 2019-12-07
Payer: MEDICAID

## 2019-12-07 VITALS
HEART RATE: 97 BPM | RESPIRATION RATE: 16 BRPM | WEIGHT: 293 LBS | BODY MASS INDEX: 47.09 KG/M2 | OXYGEN SATURATION: 97 % | DIASTOLIC BLOOD PRESSURE: 73 MMHG | TEMPERATURE: 97.8 F | HEIGHT: 66 IN | SYSTOLIC BLOOD PRESSURE: 138 MMHG

## 2019-12-07 DIAGNOSIS — J44.1 COPD EXACERBATION (HCC): Primary | ICD-10-CM

## 2019-12-07 DIAGNOSIS — D64.9 ANEMIA, UNSPECIFIED TYPE: ICD-10-CM

## 2019-12-07 DIAGNOSIS — E11.65 TYPE 2 DIABETES MELLITUS WITH HYPERGLYCEMIA, UNSPECIFIED WHETHER LONG TERM INSULIN USE (HCC): ICD-10-CM

## 2019-12-07 LAB
ANION GAP SERPL CALCULATED.3IONS-SCNC: 8 MMOL/L (ref 7–16)
BASOPHILS ABSOLUTE: 0.03 E9/L (ref 0–0.2)
BASOPHILS RELATIVE PERCENT: 0.3 % (ref 0–2)
BUN BLDV-MCNC: 17 MG/DL (ref 8–23)
CALCIUM SERPL-MCNC: 9.5 MG/DL (ref 8.6–10.2)
CHLORIDE BLD-SCNC: 90 MMOL/L (ref 98–107)
CHP ED QC CHECK: NORMAL
CO2: 32 MMOL/L (ref 22–29)
CREAT SERPL-MCNC: 0.8 MG/DL (ref 0.5–1)
EKG ATRIAL RATE: 107 BPM
EKG P AXIS: 57 DEGREES
EKG P-R INTERVAL: 154 MS
EKG Q-T INTERVAL: 354 MS
EKG QRS DURATION: 96 MS
EKG QTC CALCULATION (BAZETT): 472 MS
EKG R AXIS: 37 DEGREES
EKG T AXIS: 45 DEGREES
EKG VENTRICULAR RATE: 107 BPM
EOSINOPHILS ABSOLUTE: 0.14 E9/L (ref 0.05–0.5)
EOSINOPHILS RELATIVE PERCENT: 1.6 % (ref 0–6)
GFR AFRICAN AMERICAN: >60
GFR NON-AFRICAN AMERICAN: >60 ML/MIN/1.73
GLUCOSE BLD-MCNC: 487 MG/DL
GLUCOSE BLD-MCNC: 566 MG/DL (ref 74–99)
HCT VFR BLD CALC: 25.9 % (ref 34–48)
HEMOGLOBIN: 7.8 G/DL (ref 11.5–15.5)
IMMATURE GRANULOCYTES #: 0.08 E9/L
IMMATURE GRANULOCYTES %: 0.9 % (ref 0–5)
LYMPHOCYTES ABSOLUTE: 1.13 E9/L (ref 1.5–4)
LYMPHOCYTES RELATIVE PERCENT: 13 % (ref 20–42)
MCH RBC QN AUTO: 26.4 PG (ref 26–35)
MCHC RBC AUTO-ENTMCNC: 30.1 % (ref 32–34.5)
MCV RBC AUTO: 87.5 FL (ref 80–99.9)
METER GLUCOSE: 487 MG/DL (ref 74–99)
MONOCYTES ABSOLUTE: 0.49 E9/L (ref 0.1–0.95)
MONOCYTES RELATIVE PERCENT: 5.6 % (ref 2–12)
NEUTROPHILS ABSOLUTE: 6.85 E9/L (ref 1.8–7.3)
NEUTROPHILS RELATIVE PERCENT: 78.6 % (ref 43–80)
PDW BLD-RTO: 15.3 FL (ref 11.5–15)
PLATELET # BLD: 237 E9/L (ref 130–450)
PMV BLD AUTO: 10.5 FL (ref 7–12)
POTASSIUM SERPL-SCNC: 4.9 MMOL/L (ref 3.5–5)
PRO-BNP: 827 PG/ML (ref 0–125)
RBC # BLD: 2.96 E12/L (ref 3.5–5.5)
SODIUM BLD-SCNC: 130 MMOL/L (ref 132–146)
TROPONIN: <0.01 NG/ML (ref 0–0.03)
WBC # BLD: 8.7 E9/L (ref 4.5–11.5)

## 2019-12-07 PROCEDURE — 93010 ELECTROCARDIOGRAM REPORT: CPT | Performed by: INTERNAL MEDICINE

## 2019-12-07 PROCEDURE — 85025 COMPLETE CBC W/AUTO DIFF WBC: CPT

## 2019-12-07 PROCEDURE — 83880 ASSAY OF NATRIURETIC PEPTIDE: CPT

## 2019-12-07 PROCEDURE — 96374 THER/PROPH/DIAG INJ IV PUSH: CPT

## 2019-12-07 PROCEDURE — 94640 AIRWAY INHALATION TREATMENT: CPT

## 2019-12-07 PROCEDURE — 36415 COLL VENOUS BLD VENIPUNCTURE: CPT

## 2019-12-07 PROCEDURE — 6370000000 HC RX 637 (ALT 250 FOR IP): Performed by: STUDENT IN AN ORGANIZED HEALTH CARE EDUCATION/TRAINING PROGRAM

## 2019-12-07 PROCEDURE — 93005 ELECTROCARDIOGRAM TRACING: CPT | Performed by: STUDENT IN AN ORGANIZED HEALTH CARE EDUCATION/TRAINING PROGRAM

## 2019-12-07 PROCEDURE — 6360000002 HC RX W HCPCS: Performed by: STUDENT IN AN ORGANIZED HEALTH CARE EDUCATION/TRAINING PROGRAM

## 2019-12-07 PROCEDURE — 82962 GLUCOSE BLOOD TEST: CPT

## 2019-12-07 PROCEDURE — 99285 EMERGENCY DEPT VISIT HI MDM: CPT

## 2019-12-07 PROCEDURE — 80048 BASIC METABOLIC PNL TOTAL CA: CPT

## 2019-12-07 PROCEDURE — 84484 ASSAY OF TROPONIN QUANT: CPT

## 2019-12-07 PROCEDURE — 71045 X-RAY EXAM CHEST 1 VIEW: CPT

## 2019-12-07 RX ORDER — IPRATROPIUM BROMIDE AND ALBUTEROL SULFATE 2.5; .5 MG/3ML; MG/3ML
1 SOLUTION RESPIRATORY (INHALATION)
Status: COMPLETED | OUTPATIENT
Start: 2019-12-07 | End: 2019-12-07

## 2019-12-07 RX ORDER — PREDNISONE 20 MG/1
40 TABLET ORAL DAILY
Qty: 10 TABLET | Refills: 0 | Status: SHIPPED | OUTPATIENT
Start: 2019-12-07 | End: 2019-12-12

## 2019-12-07 RX ORDER — AZITHROMYCIN 250 MG/1
TABLET, FILM COATED ORAL
Qty: 1 PACKET | Refills: 0 | Status: SHIPPED | OUTPATIENT
Start: 2019-12-07 | End: 2019-12-11

## 2019-12-07 RX ORDER — METHYLPREDNISOLONE SODIUM SUCCINATE 125 MG/2ML
125 INJECTION, POWDER, LYOPHILIZED, FOR SOLUTION INTRAMUSCULAR; INTRAVENOUS ONCE
Status: COMPLETED | OUTPATIENT
Start: 2019-12-07 | End: 2019-12-07

## 2019-12-07 RX ORDER — NYSTATIN 100000 U/G
CREAM TOPICAL
Qty: 1 TUBE | Refills: 0 | Status: ON HOLD | OUTPATIENT
Start: 2019-12-07 | End: 2020-08-20 | Stop reason: HOSPADM

## 2019-12-07 RX ADMIN — METHYLPREDNISOLONE SODIUM SUCCINATE 125 MG: 125 INJECTION, POWDER, FOR SOLUTION INTRAMUSCULAR; INTRAVENOUS at 04:36

## 2019-12-07 RX ADMIN — INSULIN HUMAN 10 UNITS: 100 INJECTION, SOLUTION PARENTERAL at 04:36

## 2019-12-07 RX ADMIN — IPRATROPIUM BROMIDE AND ALBUTEROL SULFATE 1 AMPULE: 2.5; .5 SOLUTION RESPIRATORY (INHALATION) at 02:35

## 2019-12-07 RX ADMIN — IPRATROPIUM BROMIDE AND ALBUTEROL SULFATE 1 AMPULE: 2.5; .5 SOLUTION RESPIRATORY (INHALATION) at 02:29

## 2019-12-07 RX ADMIN — IPRATROPIUM BROMIDE AND ALBUTEROL SULFATE 1 AMPULE: 2.5; .5 SOLUTION RESPIRATORY (INHALATION) at 02:22

## 2019-12-07 ASSESSMENT — ENCOUNTER SYMPTOMS
ABDOMINAL PAIN: 0
NAUSEA: 0
COUGH: 0
SHORTNESS OF BREATH: 1
HEMOPTYSIS: 0
VOMITING: 0
WHEEZING: 1
SORE THROAT: 0
CHEST TIGHTNESS: 1
SPUTUM PRODUCTION: 0
PHOTOPHOBIA: 0

## 2019-12-09 ENCOUNTER — TELEPHONE (OUTPATIENT)
Dept: FAMILY MEDICINE CLINIC | Age: 64
End: 2019-12-09

## 2019-12-09 ENCOUNTER — TELEPHONE (OUTPATIENT)
Dept: ADMINISTRATIVE | Age: 64
End: 2019-12-09

## 2019-12-12 ENCOUNTER — CARE COORDINATION (OUTPATIENT)
Dept: CARE COORDINATION | Age: 64
End: 2019-12-12

## 2019-12-13 ENCOUNTER — CARE COORDINATION (OUTPATIENT)
Dept: CARE COORDINATION | Age: 64
End: 2019-12-13

## 2019-12-17 ENCOUNTER — TELEPHONE (OUTPATIENT)
Dept: FAMILY MEDICINE CLINIC | Age: 64
End: 2019-12-17

## 2019-12-17 DIAGNOSIS — E11.40 TYPE 2 DIABETES MELLITUS WITH DIABETIC NEUROPATHY, WITH LONG-TERM CURRENT USE OF INSULIN (HCC): Primary | ICD-10-CM

## 2019-12-17 DIAGNOSIS — Z79.4 TYPE 2 DIABETES MELLITUS WITH DIABETIC NEUROPATHY, WITH LONG-TERM CURRENT USE OF INSULIN (HCC): Primary | ICD-10-CM

## 2019-12-17 RX ORDER — GABAPENTIN 100 MG/1
100 CAPSULE ORAL 3 TIMES DAILY
Qty: 270 CAPSULE | Refills: 0 | Status: SHIPPED
Start: 2019-12-17 | End: 2020-03-26 | Stop reason: SDUPTHER

## 2019-12-19 ENCOUNTER — CARE COORDINATION (OUTPATIENT)
Dept: CARE COORDINATION | Age: 64
End: 2019-12-19

## 2019-12-19 ENCOUNTER — TELEPHONE (OUTPATIENT)
Dept: FAMILY MEDICINE CLINIC | Age: 64
End: 2019-12-19

## 2019-12-19 RX ORDER — SPIRONOLACTONE 25 MG/1
25 TABLET ORAL DAILY
Qty: 30 TABLET | Refills: 5 | Status: SHIPPED
Start: 2019-12-19 | End: 2020-08-27 | Stop reason: SDUPTHER

## 2019-12-19 RX ORDER — ALBUTEROL SULFATE 90 UG/1
2 AEROSOL, METERED RESPIRATORY (INHALATION) EVERY 6 HOURS PRN
Qty: 1 INHALER | Refills: 2 | Status: SHIPPED | OUTPATIENT
Start: 2019-12-19 | End: 2020-01-23

## 2019-12-19 SDOH — HEALTH STABILITY: PHYSICAL HEALTH: ON AVERAGE, HOW MANY DAYS PER WEEK DO YOU ENGAGE IN MODERATE TO STRENUOUS EXERCISE (LIKE A BRISK WALK)?: 0 DAYS

## 2019-12-19 SDOH — SOCIAL STABILITY: SOCIAL NETWORK: ARE YOU MARRIED, WIDOWED, DIVORCED, SEPARATED, NEVER MARRIED, OR LIVING WITH A PARTNER?: MARRIED

## 2019-12-19 SDOH — SOCIAL STABILITY: SOCIAL NETWORK: HOW OFTEN DO YOU ATTEND CHURCH OR RELIGIOUS SERVICES?: NEVER

## 2019-12-19 SDOH — SOCIAL STABILITY: SOCIAL NETWORK: HOW OFTEN DO YOU GET TOGETHER WITH FRIENDS OR RELATIVES?: MORE THAN THREE TIMES A WEEK

## 2019-12-19 SDOH — HEALTH STABILITY: PHYSICAL HEALTH: ON AVERAGE, HOW MANY MINUTES DO YOU ENGAGE IN EXERCISE AT THIS LEVEL?: 0 MIN

## 2019-12-19 SDOH — ECONOMIC STABILITY: TRANSPORTATION INSECURITY
IN THE PAST 12 MONTHS, HAS LACK OF TRANSPORTATION KEPT YOU FROM MEETINGS, WORK, OR FROM GETTING THINGS NEEDED FOR DAILY LIVING?: NO

## 2019-12-19 SDOH — ECONOMIC STABILITY: TRANSPORTATION INSECURITY
IN THE PAST 12 MONTHS, HAS THE LACK OF TRANSPORTATION KEPT YOU FROM MEDICAL APPOINTMENTS OR FROM GETTING MEDICATIONS?: YES

## 2019-12-19 SDOH — SOCIAL STABILITY: SOCIAL NETWORK: HOW OFTEN DO YOU ATTENT MEETINGS OF THE CLUB OR ORGANIZATION YOU BELONG TO?: NEVER

## 2019-12-19 SDOH — SOCIAL STABILITY: SOCIAL NETWORK
IN A TYPICAL WEEK, HOW MANY TIMES DO YOU TALK ON THE PHONE WITH FAMILY, FRIENDS, OR NEIGHBORS?: MORE THAN THREE TIMES A WEEK

## 2019-12-19 SDOH — ECONOMIC STABILITY: FOOD INSECURITY: WITHIN THE PAST 12 MONTHS, YOU WORRIED THAT YOUR FOOD WOULD RUN OUT BEFORE YOU GOT MONEY TO BUY MORE.: NEVER TRUE

## 2019-12-19 SDOH — HEALTH STABILITY: MENTAL HEALTH: HOW OFTEN DO YOU HAVE A DRINK CONTAINING ALCOHOL?: NEVER

## 2019-12-19 SDOH — SOCIAL STABILITY: SOCIAL NETWORK
DO YOU BELONG TO ANY CLUBS OR ORGANIZATIONS SUCH AS CHURCH GROUPS UNIONS, FRATERNAL OR ATHLETIC GROUPS, OR SCHOOL GROUPS?: NO

## 2019-12-19 SDOH — ECONOMIC STABILITY: FOOD INSECURITY: WITHIN THE PAST 12 MONTHS, THE FOOD YOU BOUGHT JUST DIDN'T LAST AND YOU DIDN'T HAVE MONEY TO GET MORE.: NEVER TRUE

## 2019-12-19 SDOH — HEALTH STABILITY: MENTAL HEALTH
STRESS IS WHEN SOMEONE FEELS TENSE, NERVOUS, ANXIOUS, OR CAN'T SLEEP AT NIGHT BECAUSE THEIR MIND IS TROUBLED. HOW STRESSED ARE YOU?: NOT AT ALL

## 2019-12-19 SDOH — ECONOMIC STABILITY: INCOME INSECURITY: HOW HARD IS IT FOR YOU TO PAY FOR THE VERY BASICS LIKE FOOD, HOUSING, MEDICAL CARE, AND HEATING?: NOT HARD AT ALL

## 2019-12-19 ASSESSMENT — ENCOUNTER SYMPTOMS: DYSPNEA ASSOCIATED WITH: MINIMAL EXERTION

## 2019-12-20 ENCOUNTER — TELEPHONE (OUTPATIENT)
Dept: FAMILY MEDICINE CLINIC | Age: 64
End: 2019-12-20

## 2019-12-23 ENCOUNTER — CARE COORDINATION (OUTPATIENT)
Dept: CARE COORDINATION | Age: 64
End: 2019-12-23

## 2019-12-23 ENCOUNTER — TELEPHONE (OUTPATIENT)
Dept: FAMILY MEDICINE CLINIC | Age: 64
End: 2019-12-23

## 2019-12-24 ENCOUNTER — HOSPITAL ENCOUNTER (INPATIENT)
Age: 64
LOS: 3 days | Discharge: NURSING, REHAB OR LONG-TERM CARE | DRG: 383 | End: 2019-12-27
Attending: EMERGENCY MEDICINE | Admitting: INTERNAL MEDICINE
Payer: MEDICAID

## 2019-12-24 ENCOUNTER — APPOINTMENT (OUTPATIENT)
Dept: GENERAL RADIOLOGY | Age: 64
DRG: 383 | End: 2019-12-24
Payer: MEDICAID

## 2019-12-24 PROBLEM — I89.0 LYMPHEDEMA OF BOTH LOWER EXTREMITIES: Chronic | Status: ACTIVE | Noted: 2018-04-13

## 2019-12-24 PROBLEM — M84.363A STRESS FRACTURE OF RIGHT FIBULA: Status: ACTIVE | Noted: 2019-12-24

## 2019-12-24 PROBLEM — K21.9 GASTROESOPHAGEAL REFLUX DISEASE WITHOUT ESOPHAGITIS: Chronic | Status: ACTIVE | Noted: 2019-12-24

## 2019-12-24 PROBLEM — Z86.79 ATRIAL FIBRILLATION, CURRENTLY IN SINUS RHYTHM: Chronic | Status: ACTIVE | Noted: 2019-12-24

## 2019-12-24 LAB
ALBUMIN SERPL-MCNC: 3.5 G/DL (ref 3.5–5.2)
ALP BLD-CCNC: 88 U/L (ref 35–104)
ALT SERPL-CCNC: 13 U/L (ref 0–32)
ANION GAP SERPL CALCULATED.3IONS-SCNC: 12 MMOL/L (ref 7–16)
AST SERPL-CCNC: 28 U/L (ref 0–31)
BASOPHILS ABSOLUTE: 0.02 E9/L (ref 0–0.2)
BASOPHILS RELATIVE PERCENT: 0.4 % (ref 0–2)
BILIRUB SERPL-MCNC: 0.3 MG/DL (ref 0–1.2)
BUN BLDV-MCNC: 20 MG/DL (ref 8–23)
CALCIUM SERPL-MCNC: 8.7 MG/DL (ref 8.6–10.2)
CHLORIDE BLD-SCNC: 88 MMOL/L (ref 98–107)
CHP ED QC CHECK: NORMAL
CO2: 27 MMOL/L (ref 22–29)
CREAT SERPL-MCNC: 0.7 MG/DL (ref 0.5–1)
EOSINOPHILS ABSOLUTE: 0.09 E9/L (ref 0.05–0.5)
EOSINOPHILS RELATIVE PERCENT: 1.9 % (ref 0–6)
GFR AFRICAN AMERICAN: >60
GFR NON-AFRICAN AMERICAN: >60 ML/MIN/1.73
GLUCOSE BLD-MCNC: 421 MG/DL
GLUCOSE BLD-MCNC: 552 MG/DL (ref 74–99)
HCT VFR BLD CALC: 31.7 % (ref 34–48)
HEMOGLOBIN: 10 G/DL (ref 11.5–15.5)
IMMATURE GRANULOCYTES #: 0.01 E9/L
IMMATURE GRANULOCYTES %: 0.2 % (ref 0–5)
LACTIC ACID, SEPSIS: 1 MMOL/L (ref 0.5–1.9)
LYMPHOCYTES ABSOLUTE: 0.8 E9/L (ref 1.5–4)
LYMPHOCYTES RELATIVE PERCENT: 17.1 % (ref 20–42)
MCH RBC QN AUTO: 26.5 PG (ref 26–35)
MCHC RBC AUTO-ENTMCNC: 31.5 % (ref 32–34.5)
MCV RBC AUTO: 84.1 FL (ref 80–99.9)
METER GLUCOSE: 415 MG/DL (ref 74–99)
METER GLUCOSE: 421 MG/DL (ref 74–99)
MONOCYTES ABSOLUTE: 0.4 E9/L (ref 0.1–0.95)
MONOCYTES RELATIVE PERCENT: 8.5 % (ref 2–12)
NEUTROPHILS ABSOLUTE: 3.36 E9/L (ref 1.8–7.3)
NEUTROPHILS RELATIVE PERCENT: 71.9 % (ref 43–80)
PDW BLD-RTO: 15 FL (ref 11.5–15)
PLATELET # BLD: 209 E9/L (ref 130–450)
PMV BLD AUTO: 10.8 FL (ref 7–12)
POTASSIUM SERPL-SCNC: 5.7 MMOL/L (ref 3.5–5)
RBC # BLD: 3.77 E12/L (ref 3.5–5.5)
SEDIMENTATION RATE, ERYTHROCYTE: 41 MM/HR (ref 0–20)
SODIUM BLD-SCNC: 127 MMOL/L (ref 132–146)
TOTAL PROTEIN: 6.4 G/DL (ref 6.4–8.3)
WBC # BLD: 4.7 E9/L (ref 4.5–11.5)

## 2019-12-24 PROCEDURE — 6370000000 HC RX 637 (ALT 250 FOR IP): Performed by: EMERGENCY MEDICINE

## 2019-12-24 PROCEDURE — 87040 BLOOD CULTURE FOR BACTERIA: CPT

## 2019-12-24 PROCEDURE — 99222 1ST HOSP IP/OBS MODERATE 55: CPT | Performed by: INTERNAL MEDICINE

## 2019-12-24 PROCEDURE — 96367 TX/PROPH/DG ADDL SEQ IV INF: CPT

## 2019-12-24 PROCEDURE — 80053 COMPREHEN METABOLIC PANEL: CPT

## 2019-12-24 PROCEDURE — 99284 EMERGENCY DEPT VISIT MOD MDM: CPT

## 2019-12-24 PROCEDURE — 1200000000 HC SEMI PRIVATE

## 2019-12-24 PROCEDURE — 84145 PROCALCITONIN (PCT): CPT

## 2019-12-24 PROCEDURE — 96375 TX/PRO/DX INJ NEW DRUG ADDON: CPT

## 2019-12-24 PROCEDURE — 83605 ASSAY OF LACTIC ACID: CPT

## 2019-12-24 PROCEDURE — 2580000003 HC RX 258: Performed by: EMERGENCY MEDICINE

## 2019-12-24 PROCEDURE — 6360000002 HC RX W HCPCS: Performed by: EMERGENCY MEDICINE

## 2019-12-24 PROCEDURE — 2500000003 HC RX 250 WO HCPCS: Performed by: EMERGENCY MEDICINE

## 2019-12-24 PROCEDURE — 82962 GLUCOSE BLOOD TEST: CPT

## 2019-12-24 PROCEDURE — 85025 COMPLETE CBC W/AUTO DIFF WBC: CPT

## 2019-12-24 PROCEDURE — 2700000000 HC OXYGEN THERAPY PER DAY

## 2019-12-24 PROCEDURE — 96365 THER/PROPH/DIAG IV INF INIT: CPT

## 2019-12-24 PROCEDURE — 94761 N-INVAS EAR/PLS OXIMETRY MLT: CPT

## 2019-12-24 PROCEDURE — 85651 RBC SED RATE NONAUTOMATED: CPT

## 2019-12-24 PROCEDURE — 73610 X-RAY EXAM OF ANKLE: CPT

## 2019-12-24 PROCEDURE — 86140 C-REACTIVE PROTEIN: CPT

## 2019-12-24 RX ORDER — OMEGA-3-ACID ETHYL ESTERS 1 G/1
2 CAPSULE, LIQUID FILLED ORAL 2 TIMES DAILY
Status: DISCONTINUED | OUTPATIENT
Start: 2019-12-24 | End: 2019-12-27 | Stop reason: HOSPADM

## 2019-12-24 RX ORDER — POTASSIUM CHLORIDE 20 MEQ/1
40 TABLET, EXTENDED RELEASE ORAL PRN
Status: DISCONTINUED | OUTPATIENT
Start: 2019-12-24 | End: 2019-12-27 | Stop reason: HOSPADM

## 2019-12-24 RX ORDER — ICOSAPENT ETHYL 1000 MG/1
2 CAPSULE ORAL 2 TIMES DAILY
Status: DISCONTINUED | OUTPATIENT
Start: 2019-12-24 | End: 2019-12-24 | Stop reason: CLARIF

## 2019-12-24 RX ORDER — TRAZODONE HYDROCHLORIDE 50 MG/1
50 TABLET ORAL NIGHTLY PRN
Status: DISCONTINUED | OUTPATIENT
Start: 2019-12-24 | End: 2019-12-27 | Stop reason: HOSPADM

## 2019-12-24 RX ORDER — MONTELUKAST SODIUM 10 MG/1
10 TABLET ORAL NIGHTLY
Status: DISCONTINUED | OUTPATIENT
Start: 2019-12-24 | End: 2019-12-27 | Stop reason: HOSPADM

## 2019-12-24 RX ORDER — DEXTROSE MONOHYDRATE 50 MG/ML
100 INJECTION, SOLUTION INTRAVENOUS PRN
Status: DISCONTINUED | OUTPATIENT
Start: 2019-12-24 | End: 2019-12-27 | Stop reason: HOSPADM

## 2019-12-24 RX ORDER — ACETAMINOPHEN 325 MG/1
650 TABLET ORAL EVERY 4 HOURS PRN
Status: DISCONTINUED | OUTPATIENT
Start: 2019-12-24 | End: 2019-12-27 | Stop reason: HOSPADM

## 2019-12-24 RX ORDER — DOXYCYCLINE HYCLATE 100 MG/1
100 CAPSULE ORAL EVERY 12 HOURS SCHEDULED
Status: DISCONTINUED | OUTPATIENT
Start: 2019-12-25 | End: 2019-12-27 | Stop reason: HOSPADM

## 2019-12-24 RX ORDER — GABAPENTIN 100 MG/1
100 CAPSULE ORAL 3 TIMES DAILY
Status: DISCONTINUED | OUTPATIENT
Start: 2019-12-24 | End: 2019-12-27 | Stop reason: HOSPADM

## 2019-12-24 RX ORDER — DILTIAZEM HYDROCHLORIDE 240 MG/1
240 CAPSULE, COATED, EXTENDED RELEASE ORAL DAILY
Status: DISCONTINUED | OUTPATIENT
Start: 2019-12-25 | End: 2019-12-27 | Stop reason: HOSPADM

## 2019-12-24 RX ORDER — ALBUTEROL SULFATE 90 UG/1
2 AEROSOL, METERED RESPIRATORY (INHALATION) EVERY 6 HOURS PRN
Status: DISCONTINUED | OUTPATIENT
Start: 2019-12-24 | End: 2019-12-27 | Stop reason: HOSPADM

## 2019-12-24 RX ORDER — MAGNESIUM SULFATE 1 G/100ML
1 INJECTION INTRAVENOUS PRN
Status: DISCONTINUED | OUTPATIENT
Start: 2019-12-24 | End: 2019-12-27 | Stop reason: HOSPADM

## 2019-12-24 RX ORDER — INSULIN GLARGINE 100 [IU]/ML
60 INJECTION, SOLUTION SUBCUTANEOUS NIGHTLY
Status: DISCONTINUED | OUTPATIENT
Start: 2019-12-25 | End: 2019-12-25

## 2019-12-24 RX ORDER — CEFAZOLIN SODIUM 2 G/50ML
2 SOLUTION INTRAVENOUS ONCE
Status: COMPLETED | OUTPATIENT
Start: 2019-12-24 | End: 2019-12-24

## 2019-12-24 RX ORDER — DEXTROSE MONOHYDRATE 25 G/50ML
12.5 INJECTION, SOLUTION INTRAVENOUS PRN
Status: DISCONTINUED | OUTPATIENT
Start: 2019-12-24 | End: 2019-12-27 | Stop reason: HOSPADM

## 2019-12-24 RX ORDER — SODIUM CHLORIDE 0.9 % (FLUSH) 0.9 %
10 SYRINGE (ML) INJECTION EVERY 12 HOURS SCHEDULED
Status: DISCONTINUED | OUTPATIENT
Start: 2019-12-24 | End: 2019-12-27 | Stop reason: HOSPADM

## 2019-12-24 RX ORDER — NICOTINE POLACRILEX 4 MG
15 LOZENGE BUCCAL PRN
Status: DISCONTINUED | OUTPATIENT
Start: 2019-12-24 | End: 2019-12-27 | Stop reason: HOSPADM

## 2019-12-24 RX ORDER — ONDANSETRON 2 MG/ML
4 INJECTION INTRAMUSCULAR; INTRAVENOUS EVERY 6 HOURS PRN
Status: DISCONTINUED | OUTPATIENT
Start: 2019-12-24 | End: 2019-12-27 | Stop reason: HOSPADM

## 2019-12-24 RX ORDER — INSULIN GLARGINE 100 [IU]/ML
100 INJECTION, SOLUTION SUBCUTANEOUS NIGHTLY
Status: DISCONTINUED | OUTPATIENT
Start: 2019-12-24 | End: 2019-12-24

## 2019-12-24 RX ORDER — CEPHALEXIN 500 MG/1
500 CAPSULE ORAL EVERY 8 HOURS SCHEDULED
Status: DISCONTINUED | OUTPATIENT
Start: 2019-12-25 | End: 2019-12-27 | Stop reason: HOSPADM

## 2019-12-24 RX ORDER — ESCITALOPRAM OXALATE 10 MG/1
20 TABLET ORAL DAILY
Status: DISCONTINUED | OUTPATIENT
Start: 2019-12-25 | End: 2019-12-27 | Stop reason: HOSPADM

## 2019-12-24 RX ORDER — CEFAZOLIN SODIUM 2 G/50ML
2 SOLUTION INTRAVENOUS ONCE
Status: DISCONTINUED | OUTPATIENT
Start: 2019-12-24 | End: 2019-12-24

## 2019-12-24 RX ORDER — SPIRONOLACTONE 25 MG/1
25 TABLET ORAL DAILY
Status: DISCONTINUED | OUTPATIENT
Start: 2019-12-25 | End: 2019-12-27 | Stop reason: HOSPADM

## 2019-12-24 RX ORDER — METOPROLOL SUCCINATE 100 MG/1
100 TABLET, EXTENDED RELEASE ORAL 2 TIMES DAILY
Status: DISCONTINUED | OUTPATIENT
Start: 2019-12-24 | End: 2019-12-27 | Stop reason: HOSPADM

## 2019-12-24 RX ORDER — SODIUM CHLORIDE 0.9 % (FLUSH) 0.9 %
10 SYRINGE (ML) INJECTION PRN
Status: DISCONTINUED | OUTPATIENT
Start: 2019-12-24 | End: 2019-12-27 | Stop reason: HOSPADM

## 2019-12-24 RX ORDER — IPRATROPIUM BROMIDE AND ALBUTEROL SULFATE 2.5; .5 MG/3ML; MG/3ML
1 SOLUTION RESPIRATORY (INHALATION) EVERY 4 HOURS PRN
Status: DISCONTINUED | OUTPATIENT
Start: 2019-12-24 | End: 2019-12-27 | Stop reason: HOSPADM

## 2019-12-24 RX ORDER — POTASSIUM CHLORIDE 7.45 MG/ML
10 INJECTION INTRAVENOUS PRN
Status: DISCONTINUED | OUTPATIENT
Start: 2019-12-24 | End: 2019-12-27 | Stop reason: HOSPADM

## 2019-12-24 RX ORDER — PANTOPRAZOLE SODIUM 40 MG/1
40 TABLET, DELAYED RELEASE ORAL
Status: DISCONTINUED | OUTPATIENT
Start: 2019-12-25 | End: 2019-12-27 | Stop reason: HOSPADM

## 2019-12-24 RX ADMIN — CEFAZOLIN SODIUM 2 G: 2 SOLUTION INTRAVENOUS at 18:37

## 2019-12-24 RX ADMIN — DOXYCYCLINE 100 MG: 100 INJECTION, POWDER, LYOPHILIZED, FOR SOLUTION INTRAVENOUS at 19:10

## 2019-12-24 RX ADMIN — INSULIN HUMAN 10 UNITS: 100 INJECTION, SOLUTION PARENTERAL at 18:33

## 2019-12-24 ASSESSMENT — PAIN - FUNCTIONAL ASSESSMENT
PAIN_FUNCTIONAL_ASSESSMENT: PREVENTS OR INTERFERES WITH MANY ACTIVE NOT PASSIVE ACTIVITIES
PAIN_FUNCTIONAL_ASSESSMENT: PREVENTS OR INTERFERES WITH MANY ACTIVE NOT PASSIVE ACTIVITIES

## 2019-12-24 ASSESSMENT — PAIN SCALES - GENERAL
PAINLEVEL_OUTOF10: 7
PAINLEVEL_OUTOF10: 3

## 2019-12-24 ASSESSMENT — PAIN DESCRIPTION - PAIN TYPE
TYPE: ACUTE PAIN
TYPE: ACUTE PAIN

## 2019-12-24 ASSESSMENT — PAIN DESCRIPTION - LOCATION
LOCATION: LEG
LOCATION: LEG

## 2019-12-24 ASSESSMENT — PAIN DESCRIPTION - PROGRESSION
CLINICAL_PROGRESSION: NOT CHANGED
CLINICAL_PROGRESSION: NOT CHANGED

## 2019-12-24 ASSESSMENT — PAIN DESCRIPTION - ONSET: ONSET: ON-GOING

## 2019-12-24 ASSESSMENT — PAIN DESCRIPTION - ORIENTATION: ORIENTATION: RIGHT;LEFT

## 2019-12-24 ASSESSMENT — PAIN DESCRIPTION - FREQUENCY
FREQUENCY: CONTINUOUS
FREQUENCY: CONTINUOUS

## 2019-12-24 ASSESSMENT — PAIN DESCRIPTION - DESCRIPTORS
DESCRIPTORS: ACHING;DISCOMFORT
DESCRIPTORS: ACHING;CONSTANT

## 2019-12-24 NOTE — ED PROVIDER NOTES
Range    Sed Rate 41 (H) 0 - 20 mm/Hr   POCT glucose   Result Value Ref Range    Glucose 421 mg/dL    QC OK? ok    POCT Glucose   Result Value Ref Range    Meter Glucose 421 (H) 74 - 99 mg/dL   POCT Glucose   Result Value Ref Range    Meter Glucose 415 (H) 74 - 99 mg/dL       RADIOLOGY:  XR ANKLE RIGHT (MIN 3 VIEWS)   Final Result    IMPRESSION:      Findings concerning for stress fracture at the distal tibial and   fibular shafts. Clinical correlation for point tenderness is   recommended. XR ANKLE LEFT (MIN 3 VIEWS)   Final Result      Limited exam. No definite evidence of acute osseous abnormality. Severe ankle osteoarthritis.            ------------------------- NURSING NOTES AND VITALS REVIEWED ---------------------------  Date / Time Roomed:  12/24/2019  4:20 PM  ED Bed Assignment:  [id number]    The nursing notes within the ED encounter and vital signs as below have been reviewed. Patient Vitals for the past 24 hrs:   BP Temp Temp src Pulse Resp SpO2 Height Weight   12/24/19 2101 137/66 98 °F (36.7 °C) Oral 90 18 97 % -- --   12/24/19 2047 -- -- -- -- -- -- 5' 3\" (1.6 m) 300 lb (136.1 kg)   12/24/19 2006 (!) 142/80 98 °F (36.7 °C) Oral 92 16 99 % -- --   12/24/19 1832 (!) 142/66 -- -- 87 14 98 % -- --   12/24/19 1625 (!) 158/58 98.1 °F (36.7 °C) Oral 92 18 98 % 5' 3\" (1.6 m) 300 lb (136.1 kg)       Oxygen Saturation Interpretation: Normal    ------------------------------------------ PROGRESS NOTES ------------------------------------------  Re-evaluation(s):  Time: 2096  Patients symptoms show no change  Repeat physical examination is not changed    Counseling:  I have spoken with the patient and discussed todays results, in addition to providing specific details for the plan of care and counseling regarding the diagnosis and prognosis.   Their questions are answered at this time and they are agreeable with the plan of admission.    --------------------------------- ADDITIONAL PROVIDER NOTES ---------------------------------  Consultations:  Time: 1955. Spoke with Dr. Katherine Pena. Discussed case. They will admit the patient. This patient's ED course included: a personal history and physicial examination, re-evaluation prior to disposition, multiple bedside re-evaluations, IV medications, cardiac monitoring and continuous pulse oximetry    This patient has remained hemodynamically stable during their ED course. Diagnosis:  1. Cellulitis of lower extremity, unspecified laterality    2. Closed fracture of right ankle, initial encounter        Disposition:  Patient's disposition: Admit to telemetry  Patient's condition is stable.          Azam Vernon., DO  Resident  12/25/19 0514

## 2019-12-25 LAB
ALBUMIN SERPL-MCNC: 3.5 G/DL (ref 3.5–5.2)
ALP BLD-CCNC: 86 U/L (ref 35–104)
ALT SERPL-CCNC: 11 U/L (ref 0–32)
ANION GAP SERPL CALCULATED.3IONS-SCNC: 8 MMOL/L (ref 7–16)
AST SERPL-CCNC: 11 U/L (ref 0–31)
BASOPHILS ABSOLUTE: 0.04 E9/L (ref 0–0.2)
BASOPHILS RELATIVE PERCENT: 0.7 % (ref 0–2)
BILIRUB SERPL-MCNC: 0.2 MG/DL (ref 0–1.2)
BILIRUBIN DIRECT: <0.2 MG/DL (ref 0–0.3)
BILIRUBIN, INDIRECT: ABNORMAL MG/DL (ref 0–1)
BUN BLDV-MCNC: 14 MG/DL (ref 8–23)
C-REACTIVE PROTEIN: 2.2 MG/DL (ref 0–0.4)
CALCIUM SERPL-MCNC: 9.1 MG/DL (ref 8.6–10.2)
CHLORIDE BLD-SCNC: 95 MMOL/L (ref 98–107)
CO2: 33 MMOL/L (ref 22–29)
CREAT SERPL-MCNC: 0.7 MG/DL (ref 0.5–1)
EOSINOPHILS ABSOLUTE: 0.16 E9/L (ref 0.05–0.5)
EOSINOPHILS RELATIVE PERCENT: 2.8 % (ref 0–6)
GFR AFRICAN AMERICAN: >60
GFR NON-AFRICAN AMERICAN: >60 ML/MIN/1.73
GLUCOSE BLD-MCNC: 261 MG/DL (ref 74–99)
GLUCOSE BLD-MCNC: 416 MG/DL (ref 74–99)
HCT VFR BLD CALC: 33.4 % (ref 34–48)
HEMOGLOBIN: 10.3 G/DL (ref 11.5–15.5)
IMMATURE GRANULOCYTES #: 0.03 E9/L
IMMATURE GRANULOCYTES %: 0.5 % (ref 0–5)
LYMPHOCYTES ABSOLUTE: 1.31 E9/L (ref 1.5–4)
LYMPHOCYTES RELATIVE PERCENT: 22.8 % (ref 20–42)
MAGNESIUM: 1.8 MG/DL (ref 1.6–2.6)
MCH RBC QN AUTO: 25.8 PG (ref 26–35)
MCHC RBC AUTO-ENTMCNC: 30.8 % (ref 32–34.5)
MCV RBC AUTO: 83.7 FL (ref 80–99.9)
METER GLUCOSE: 215 MG/DL (ref 74–99)
METER GLUCOSE: 266 MG/DL (ref 74–99)
METER GLUCOSE: 428 MG/DL (ref 74–99)
METER GLUCOSE: >500 MG/DL (ref 74–99)
MONOCYTES ABSOLUTE: 0.69 E9/L (ref 0.1–0.95)
MONOCYTES RELATIVE PERCENT: 12 % (ref 2–12)
NEUTROPHILS ABSOLUTE: 3.51 E9/L (ref 1.8–7.3)
NEUTROPHILS RELATIVE PERCENT: 61.2 % (ref 43–80)
PDW BLD-RTO: 14.7 FL (ref 11.5–15)
PLATELET # BLD: 214 E9/L (ref 130–450)
PMV BLD AUTO: 10 FL (ref 7–12)
POTASSIUM REFLEX MAGNESIUM: 3.6 MMOL/L (ref 3.5–5)
PROCALCITONIN: 0.12 NG/ML (ref 0–0.08)
RBC # BLD: 3.99 E12/L (ref 3.5–5.5)
SODIUM BLD-SCNC: 136 MMOL/L (ref 132–146)
TOTAL PROTEIN: 6.1 G/DL (ref 6.4–8.3)
WBC # BLD: 5.7 E9/L (ref 4.5–11.5)

## 2019-12-25 PROCEDURE — 83735 ASSAY OF MAGNESIUM: CPT

## 2019-12-25 PROCEDURE — 36415 COLL VENOUS BLD VENIPUNCTURE: CPT

## 2019-12-25 PROCEDURE — 82962 GLUCOSE BLOOD TEST: CPT

## 2019-12-25 PROCEDURE — 6360000002 HC RX W HCPCS: Performed by: INTERNAL MEDICINE

## 2019-12-25 PROCEDURE — 80048 BASIC METABOLIC PNL TOTAL CA: CPT

## 2019-12-25 PROCEDURE — 6370000000 HC RX 637 (ALT 250 FOR IP): Performed by: INTERNAL MEDICINE

## 2019-12-25 PROCEDURE — 85025 COMPLETE CBC W/AUTO DIFF WBC: CPT

## 2019-12-25 PROCEDURE — 2580000003 HC RX 258: Performed by: INTERNAL MEDICINE

## 2019-12-25 PROCEDURE — 80076 HEPATIC FUNCTION PANEL: CPT

## 2019-12-25 PROCEDURE — 99232 SBSQ HOSP IP/OBS MODERATE 35: CPT | Performed by: INTERNAL MEDICINE

## 2019-12-25 PROCEDURE — 2500000003 HC RX 250 WO HCPCS: Performed by: INTERNAL MEDICINE

## 2019-12-25 PROCEDURE — 82947 ASSAY GLUCOSE BLOOD QUANT: CPT

## 2019-12-25 PROCEDURE — 1200000000 HC SEMI PRIVATE

## 2019-12-25 PROCEDURE — 2700000000 HC OXYGEN THERAPY PER DAY

## 2019-12-25 RX ORDER — INSULIN GLARGINE 100 [IU]/ML
100 INJECTION, SOLUTION SUBCUTANEOUS NIGHTLY
Status: DISCONTINUED | OUTPATIENT
Start: 2019-12-25 | End: 2019-12-27 | Stop reason: HOSPADM

## 2019-12-25 RX ORDER — INSULIN GLARGINE 100 [IU]/ML
60 INJECTION, SOLUTION SUBCUTANEOUS ONCE
Status: COMPLETED | OUTPATIENT
Start: 2019-12-25 | End: 2019-12-25

## 2019-12-25 RX ADMIN — CEPHALEXIN 500 MG: 500 CAPSULE ORAL at 08:47

## 2019-12-25 RX ADMIN — OMEGA-3-ACID ETHYL ESTERS 2 G: 900 CAPSULE, LIQUID FILLED ORAL at 08:47

## 2019-12-25 RX ADMIN — OMEGA-3-ACID ETHYL ESTERS 2 G: 900 CAPSULE, LIQUID FILLED ORAL at 00:06

## 2019-12-25 RX ADMIN — DOXYCYCLINE HYCLATE 100 MG: 100 CAPSULE ORAL at 21:22

## 2019-12-25 RX ADMIN — DOXYCYCLINE HYCLATE 100 MG: 100 CAPSULE ORAL at 08:47

## 2019-12-25 RX ADMIN — ACETAMINOPHEN 650 MG: 325 TABLET ORAL at 08:47

## 2019-12-25 RX ADMIN — INSULIN LISPRO 50 UNITS: 100 INJECTION, SOLUTION INTRAVENOUS; SUBCUTANEOUS at 11:53

## 2019-12-25 RX ADMIN — METOPROLOL SUCCINATE 100 MG: 100 TABLET, EXTENDED RELEASE ORAL at 08:47

## 2019-12-25 RX ADMIN — SODIUM CHLORIDE, PRESERVATIVE FREE 10 ML: 5 INJECTION INTRAVENOUS at 00:06

## 2019-12-25 RX ADMIN — INSULIN LISPRO 3 UNITS: 100 INJECTION, SOLUTION INTRAVENOUS; SUBCUTANEOUS at 21:23

## 2019-12-25 RX ADMIN — ACETAMINOPHEN 650 MG: 325 TABLET ORAL at 19:48

## 2019-12-25 RX ADMIN — INSULIN LISPRO 50 UNITS: 100 INJECTION, SOLUTION INTRAVENOUS; SUBCUTANEOUS at 17:05

## 2019-12-25 RX ADMIN — GABAPENTIN 100 MG: 100 CAPSULE ORAL at 12:59

## 2019-12-25 RX ADMIN — GABAPENTIN 100 MG: 100 CAPSULE ORAL at 00:07

## 2019-12-25 RX ADMIN — GABAPENTIN 100 MG: 100 CAPSULE ORAL at 21:22

## 2019-12-25 RX ADMIN — ESCITALOPRAM OXALATE 20 MG: 10 TABLET ORAL at 08:47

## 2019-12-25 RX ADMIN — MICONAZOLE NITRATE: 20 POWDER TOPICAL at 00:06

## 2019-12-25 RX ADMIN — GABAPENTIN 100 MG: 100 CAPSULE ORAL at 08:47

## 2019-12-25 RX ADMIN — INSULIN LISPRO 6 UNITS: 100 INJECTION, SOLUTION INTRAVENOUS; SUBCUTANEOUS at 07:10

## 2019-12-25 RX ADMIN — MICONAZOLE NITRATE: 20 POWDER TOPICAL at 21:22

## 2019-12-25 RX ADMIN — SODIUM CHLORIDE, PRESERVATIVE FREE 10 ML: 5 INJECTION INTRAVENOUS at 21:22

## 2019-12-25 RX ADMIN — PANTOPRAZOLE SODIUM 40 MG: 40 TABLET, DELAYED RELEASE ORAL at 06:31

## 2019-12-25 RX ADMIN — ENOXAPARIN SODIUM 40 MG: 40 INJECTION SUBCUTANEOUS at 08:47

## 2019-12-25 RX ADMIN — OMEGA-3-ACID ETHYL ESTERS 2 G: 900 CAPSULE, LIQUID FILLED ORAL at 21:22

## 2019-12-25 RX ADMIN — MICONAZOLE NITRATE: 20 POWDER TOPICAL at 08:48

## 2019-12-25 RX ADMIN — MONTELUKAST SODIUM 10 MG: 10 TABLET, FILM COATED ORAL at 21:22

## 2019-12-25 RX ADMIN — ACETAMINOPHEN 650 MG: 325 TABLET ORAL at 12:59

## 2019-12-25 RX ADMIN — CEPHALEXIN 500 MG: 500 CAPSULE ORAL at 13:00

## 2019-12-25 RX ADMIN — METOPROLOL SUCCINATE 100 MG: 100 TABLET, EXTENDED RELEASE ORAL at 00:07

## 2019-12-25 RX ADMIN — MONTELUKAST SODIUM 10 MG: 10 TABLET, FILM COATED ORAL at 00:07

## 2019-12-25 RX ADMIN — INSULIN LISPRO 40 UNITS: 100 INJECTION, SOLUTION INTRAVENOUS; SUBCUTANEOUS at 00:11

## 2019-12-25 RX ADMIN — ACETAMINOPHEN 650 MG: 325 TABLET ORAL at 02:05

## 2019-12-25 RX ADMIN — SODIUM CHLORIDE, PRESERVATIVE FREE 10 ML: 5 INJECTION INTRAVENOUS at 08:48

## 2019-12-25 RX ADMIN — INSULIN GLARGINE 100 UNITS: 100 INJECTION, SOLUTION SUBCUTANEOUS at 21:23

## 2019-12-25 RX ADMIN — TRAZODONE HYDROCHLORIDE 50 MG: 50 TABLET ORAL at 00:07

## 2019-12-25 RX ADMIN — INSULIN GLARGINE 60 UNITS: 100 INJECTION, SOLUTION SUBCUTANEOUS at 00:10

## 2019-12-25 RX ADMIN — CEPHALEXIN 500 MG: 500 CAPSULE ORAL at 21:23

## 2019-12-25 RX ADMIN — DILTIAZEM HYDROCHLORIDE 240 MG: 240 CAPSULE, COATED, EXTENDED RELEASE ORAL at 08:49

## 2019-12-25 RX ADMIN — TRAZODONE HYDROCHLORIDE 50 MG: 50 TABLET ORAL at 21:22

## 2019-12-25 RX ADMIN — INSULIN LISPRO 6 UNITS: 100 INJECTION, SOLUTION INTRAVENOUS; SUBCUTANEOUS at 17:06

## 2019-12-25 RX ADMIN — INSULIN LISPRO 2 UNITS: 100 INJECTION, SOLUTION INTRAVENOUS; SUBCUTANEOUS at 11:55

## 2019-12-25 RX ADMIN — METOPROLOL SUCCINATE 100 MG: 100 TABLET, EXTENDED RELEASE ORAL at 21:22

## 2019-12-25 ASSESSMENT — PAIN DESCRIPTION - ONSET
ONSET: ON-GOING
ONSET: GRADUAL
ONSET: ON-GOING

## 2019-12-25 ASSESSMENT — PAIN DESCRIPTION - PROGRESSION
CLINICAL_PROGRESSION: NOT CHANGED
CLINICAL_PROGRESSION: NOT CHANGED

## 2019-12-25 ASSESSMENT — ENCOUNTER SYMPTOMS
COLOR CHANGE: 1
PHOTOPHOBIA: 0
CHEST TIGHTNESS: 0
SHORTNESS OF BREATH: 0
VOMITING: 0
ABDOMINAL PAIN: 0
NAUSEA: 0
WHEEZING: 0

## 2019-12-25 ASSESSMENT — PAIN SCALES - GENERAL
PAINLEVEL_OUTOF10: 3
PAINLEVEL_OUTOF10: 6
PAINLEVEL_OUTOF10: 3
PAINLEVEL_OUTOF10: 6
PAINLEVEL_OUTOF10: 3
PAINLEVEL_OUTOF10: 4

## 2019-12-25 ASSESSMENT — PAIN DESCRIPTION - PAIN TYPE
TYPE: ACUTE PAIN

## 2019-12-25 ASSESSMENT — PAIN DESCRIPTION - LOCATION
LOCATION: LEG
LOCATION: LEG
LOCATION: BACK

## 2019-12-25 ASSESSMENT — PAIN DESCRIPTION - DESCRIPTORS
DESCRIPTORS: ACHING;DISCOMFORT
DESCRIPTORS: ACHING;DISCOMFORT
DESCRIPTORS: ACHING

## 2019-12-25 ASSESSMENT — PAIN - FUNCTIONAL ASSESSMENT
PAIN_FUNCTIONAL_ASSESSMENT: PREVENTS OR INTERFERES SOME ACTIVE ACTIVITIES AND ADLS
PAIN_FUNCTIONAL_ASSESSMENT: PREVENTS OR INTERFERES SOME ACTIVE ACTIVITIES AND ADLS

## 2019-12-25 ASSESSMENT — PAIN DESCRIPTION - FREQUENCY
FREQUENCY: CONTINUOUS
FREQUENCY: CONTINUOUS

## 2019-12-25 ASSESSMENT — PAIN DESCRIPTION - ORIENTATION
ORIENTATION: RIGHT;LEFT
ORIENTATION: RIGHT

## 2019-12-25 NOTE — PROGRESS NOTES
[facility]ennifer [facility] Hospitalist   Progress Note    Admitting Date and Time: 12/24/2019  4:20 PM  Admit Dx: Cellulitis [L03.90]  Cellulitis [L03.90]     Seen for follow-up on cellulitis    Subjective:  Has chronic bipedal lymphedema, chronic skin changes, some erythema. Denies any chest pain short of breath or abdominal pain. ROS: denies fever, chills, cp, sob, n/v, HA unless stated above.      diltiazem  240 mg Oral Daily    escitalopram  20 mg Oral Daily    gabapentin  100 mg Oral TID    insulin lispro  50 Units Subcutaneous TID AC    pantoprazole  40 mg Oral QAM AC    metoprolol succinate  100 mg Oral BID    miconazole   Topical BID    montelukast  10 mg Oral Nightly    [Held by provider] spironolactone  25 mg Oral Daily    sodium chloride flush  10 mL Intravenous 2 times per day    enoxaparin  40 mg Subcutaneous Daily    omega-3 acid ethyl esters  2 g Oral BID    insulin glargine  60 Units Subcutaneous Nightly    doxycycline hyclate  100 mg Oral 2 times per day    cephALEXin  500 mg Oral 3 times per day     white petrolatum, , BID PRN  albuterol sulfate HFA, 2 puff, Q6H PRN  ipratropium-albuterol, 1 vial, Q4H PRN  traZODone, 50 mg, Nightly PRN  sodium chloride flush, 10 mL, PRN  potassium chloride, 40 mEq, PRN    Or  potassium alternative oral replacement, 40 mEq, PRN    Or  potassium chloride, 10 mEq, PRN  magnesium sulfate, 1 g, PRN  magnesium hydroxide, 30 mL, Daily PRN  ondansetron, 4 mg, Q6H PRN  acetaminophen, 650 mg, Q4H PRN  glucose, 15 g, PRN  dextrose, 12.5 g, PRN  glucagon (rDNA), 1 mg, PRN  dextrose, 100 mL/hr, PRN         Objective:    BP (!) 147/71   Pulse 93   Temp 98.5 °F (36.9 °C) (Oral)   Resp 18   Ht 5' 3\" (1.6 m)   Wt 295 lb 5 oz (134 kg)   LMP  (LMP Unknown)   SpO2 99%   BMI 52.31 kg/m²   General Appearance: alert and oriented to person, place and time,  with morbid obesity, in no acute distress  Head: normocephalic and atraumatic  Eyes: pupils equal, round, and reactive to light, extraocular eye movements intact, conjunctivae normal  Neck: supple and non-tender without mass  Pulmonary/Chest: clear to auscultation bilaterally  Cardiovascular: normal rate, regular rhythm, normal S1 and S2  Abdomen: soft, non-tender, non-distended, normal bowel sounds, no masses or organomegaly  Extremities: no cyanosis, clubbing , chronic bipedal lymphedema with stasis changes and some erythema  Musculoskeletal: normal range of motion, no joint swelling, deformity or tenderness  Neurologic:  no cranial nerve deficit,speech normal      Recent Labs     12/24/19 1657 12/24/19 2035 12/25/19  0725   *  --  136   K 5.7*  --  3.6   CL 88*  --  95*   CO2 27  --  33*   BUN 20  --  14   CREATININE 0.7  --  0.7   GLUCOSE 552* 421 261*   CALCIUM 8.7  --  9.1       Recent Labs     12/24/19 1657 12/25/19  0725   WBC 4.7 5.7   RBC 3.77 3.99   HGB 10.0* 10.3*   HCT 31.7* 33.4*   MCV 84.1 83.7   MCH 26.5 25.8*   MCHC 31.5* 30.8*   RDW 15.0 14.7    214   MPV 10.8 10.0     Labs and images reviewed     Radiology:   XR ANKLE RIGHT (MIN 3 VIEWS)   Final Result    IMPRESSION:      Findings concerning for stress fracture at the distal tibial and   fibular shafts. Clinical correlation for point tenderness is   recommended. XR ANKLE LEFT (MIN 3 VIEWS)   Final Result      Limited exam. No definite evidence of acute osseous abnormality. Severe ankle osteoarthritis.           Assessment:    Principal Problem:    Stress fracture of right fibula  Active Problems:    Anxiety and depression    Chronic anticoagulation    Diabetic neuropathy (HCC)    Chronic diastolic heart failure (HCC)    Lymphedema of both lower extremities    Chronic anemia    COPD (chronic obstructive pulmonary disease) (HCC)    Uncontrolled type 2 diabetes mellitus with hyperglycemia ([facility])    Cellulitis    Atrial fibrillation, currently in sinus rhythm    Gastroesophageal reflux disease without esophagitis  Resolved Problems:    * No resolved hospital problems. *      Plan: Both lower extremity cellulitis in setting of lymphedema-continue local care on Keflex plus doxy as per ID,. Received IV cefazolin and doxycycline in ER. Likely this is chronic. No leukocytosis or fever. ESR elevated. Stress fracture right distal tibia and fibula-Ortho consulted. Continue supportive treatment. Uncontrolled diabetes with hyperglycemia diabetic neuropathy/-resume as per home, ISS. On gabapentin as per home . HTN /HFpEF - Resume as per home    PAF -on beta-blocker ,for rate control as per home, Eliquis on hold in case she needs surgery. Cardizem, will resume Aldactone if K is better . COPD /asthma-resume as per home    Anxiety/depression-resume as per home    GERD- On ppi    DVT  prophy - on scds,and will resume eliquis if no surg is planned. Full code.           Electronically signed by Drea Camacho MD on 12/25/2019 at 10:50 AM

## 2019-12-25 NOTE — ED NOTES
NEMESIO faxed and verified that it was received by Liliya Guzmán on [address]. All personal belongings with patient. Patient chimed and ready for transport.      Petros Maurer RN  12/24/19 2022

## 2019-12-25 NOTE — PROGRESS NOTES
Wound care consult done via perfect serve.     Electronically signed by Beth Aj RN on 12/25/2019 at 2:48 AM

## 2019-12-25 NOTE — PROGRESS NOTES
1600 BGL registering RR High, patient's family provided her an extra meal of Homero Dinner as well as sambaash and Various Pastries. Educated patient on importance of monitoring food intake.

## 2019-12-25 NOTE — PROGRESS NOTES
Per Dr. Michelle Carmichael only give 6 U of humalog because pt. Is NPO.     Electronically signed by Aparna Campos RN on 12/25/2019 at 6:51 AM

## 2019-12-25 NOTE — H&P
Apply topically 2 times daily. Patient not taking: Reported on 12/19/2019 9/24/19   Tomas Borja MD   glucose (GLUTOSE) 40 % GEL Take 37.5 mLs by mouth as needed (hypoglycemia) 12/20/18   RAUL Caro - CNP   Elastic Bandages & Supports MISC 20-30 mmHg bilateral compression stockings  Size to fit  Dx:  Edema  Knee high  Patient not taking: Reported on 12/19/2019 12/14/17   Morenita Duong MD       Allergies:    Statins    Social History:    reports that she quit smoking about 15 years ago. Her smoking use included cigarettes. She started smoking about 40 years ago. She has a 37.50 pack-year smoking history. She has never used smokeless tobacco. She reports that she does not drink alcohol or use drugs. Family History:   family history includes Colon Cancer in her father and sister; Diabetes in her father, paternal aunt, paternal aunt, paternal uncle, and paternal uncle; Heart Disease in her mother; Other in her father.        PHYSICAL EXAM:  Vitals:  /66   Pulse 90   Temp 98 °F (36.7 °C) (Oral)   Resp 18   Ht 5' 3\" (1.6 m)   Wt 300 lb (136.1 kg)   LMP  (LMP Unknown)   SpO2 97%   BMI 53.14 kg/m²   General Appearance: AOx3 and NAD  Skin: Warm and dry  Head: Normocephalic and atraumatic, mucous membranes well hydrated   Eyes: Pupils equal, round, and reactive to light  Neck: Supple and non tender without mass   Pulmonary/Chest: Clear to auscultation bilaterally- no wheezes, no crackle, not in respiratory distress  Cardiovascular: Normal rate, normal S1 and S2 and no carotid bruits  Abdomen: Soft, non-tender, non-distended, no rebound, no rigidity, + bowel sounds  Extremities: No cyanosis, no clubbing and extensive lymphedema with bad hygiene wrapped both sides    Neurologic: No neurologic focal deficits, speech is normal, coherent     LABS:  CBC  Recent Labs     12/24/19  1657   WBC 4.7   HGB 10.0*   HCT 31.7*   MCV 84.1        BMP  Recent Labs     12/24/19  1657 extremities   · Not sure if there is any cellulitis   · Looks like these are chronic skin changes, no leukocytosis, no fever  · Check procalcitonin, CRP, ESR  · Already received cefazolin and doxycycline in ER   · Will put her on cephalexin and doxycycline PO  · Will consult ID for second opinion     Uncontrolled diabetes with hyperglycemia   Diabetic neuroathy   · C.w lantus with lower dose (from 100 to 60 units nightly) due NPO after midnight   · C.w lispro 50 units before meals   · C.w gabapentin     HFpEF   Essential HTN  History of paroxysmal AF currently sinus   · C.w metoprolol succinate 100 mg BID  · Hold eliquis in case surgery  · C.w diltiazem 240 mg daily   · Hold aldactone for now, if potassium is below 5 in AM resume      COPD/asthma without exacerbation   · C.w nasal canula oxygen  · C.w montelukast   · C.w bronchodilators  ·     Anxiety and depression   · C.w lexapro     GERD  · C.w pantoprazole     Code Status: Full   DVT prophylaxis: PCDs  Diet: Diabetic and NPO after midnight       PLEASE NOTE:    This report was transcribed using typing and voice recognition software. Every effort was made to ensure accuracy; however, inadvertent computerized transcription errors may be present.  More than 50 minutes have been spent in evaluating the patient, reviewing records and results, discussing with staff / family and other treating physicians.     Kathryn Nettles MD, MSc   [facility]

## 2019-12-25 NOTE — CONSULTS
[facility]     Consult Note                                 [address], [place], [address]                   Phone [phone]     Fax [phone]        Date:   12/25/2019  Patient name:  Lance Ceron  Date of admission:  12/24/2019  4:20 PM  MRN:   [id number]  YOB: 1955    Reason for Consult:  second opinion on cellulits    CC:   Chief Complaint   Patient presents with    Leg Pain     bilateral, history lymphedema. per patient, home health nurse thinks that they are infected        HISTORY OF PRESENT ILLNESS:                The patient is a 75 y.o. female with obesity, chronic bilateral lower extremity lymphedema was sent by her home nursing assistant because of the concern of infection on bilateral lower legs as they were looking more red especially her right lower extremity was more painful. He denies having any fever, chills. She has no leukocytosis CRP of 2.2, ESR of 41, CT of the right ankle shows changes concerning for stress fracture of distal tibia and fibular shaft. He got 1 dose of cefazolin and doxycycline and currently on doxycycline plus cephalexin. He has seen Dr. Lico Nassar in September 2019 and similar presentation with left leg involved and was recommended doxycycline and Keflex for 10 days.       Past Medical History:   has a past medical history of Anal fissure, Anemia, Anxiety and depression, Arthritis, Asthma, Atrial fibrillation ([address] Blood transfusion, CHF (congestive heart failure) ([address] COPD (chronic obstructive pulmonary disease) ([address] Disc disorder, DM2 (diabetes mellitus, type 2) ([address] Fall due to stumbling, GERD (gastroesophageal reflux disease), Hx of blood clots, Hyperlipidemia, Hypertension, Inappropriate sinus tachycardia, LVH (left ventricular hypertrophy), Lymphadenitis, chronic, Occipital neuralgia, Respiratory acidosis, and Sinus tachycardia. Past Surgical History:   has a past surgical history that includes Tonsillectomy (); Appendectomy (1965);  section ();  section ();  section (); Anus surgery (2006); Upper gastrointestinal endoscopy (2004); Colonoscopy (2004); Colonoscopy (2006); anoscopy (10/25/2006); anoscopy (2007); anoscopy (2007); Colonoscopy (3/23/2012); Anus surgery (2012); joint replacement (); Upper gastrointestinal endoscopy (N/A, 2018); Upper gastrointestinal endoscopy (N/A, 2018); and pr debridement, skin, sub-q tissue,muscle,=<20 sq cm (Left, 2018). Home Medications:    Prior to Admission medications    Medication Sig Start Date End Date Taking? Authorizing Provider   blood glucose test strips (ASCENSIA AUTODISC VI;ONE TOUCH ULTRA TEST VI) strip 1 each by In Vitro route 4 times daily As needed. 19  Yes Verner Colder, DO   spironolactone (ALDACTONE) 25 MG tablet Take 1 tablet by mouth daily 19  Yes Shin Alvarez, DO   albuterol sulfate HFA (PROAIR HFA) 108 (90 Base) MCG/ACT inhaler Inhale 2 puffs into the lungs every 6 hours as needed for Wheezing 19 Yes Verner Colder, DO   gabapentin (NEURONTIN) 100 MG capsule Take 1 capsule by mouth 3 times daily for 30 days. 19 Yes Verner Colder, DO   nystatin (MYCOSTATIN) 580031 UNIT/GM cream Apply topically 2 times daily.  19  Yes Ananda Siegel Jr., DO   montelukast (SINGULAIR) 10 MG tablet Take 1 tablet by mouth nightly 19  Yes Shin Alvarez DO   lansoprazole (PREVACID) 30 MG delayed release capsule Take 1 capsule by mouth daily 19  Yes Nacho Alvarez,    ELIQUIS 5 MG TABS tablet TAKE 1 TABLET BY MOUTH TWICE A DAY 19  Yes Shin Alvarez, DO   ferrous sulfate 325 (65 Fe) MG tablet TAKE 1 TABLET BY MOUTH EVERY DAY WITH BREAKFAST 19  Yes Shin Alvarez, DO   vitamin D (ERGOCALCIFEROL) 1.25 MG (27597 UT) CAPS capsule Take 1 capsule by mouth Twice a Week 11/25/19  Yes Forrest Alvarez DO   Icosapent Ethyl (VASCEPA) 1 g CAPS capsule Take 2 capsules by mouth 2 times daily 11/22/19  Yes Forrest Alvarez DO   Insulin Pen Needle 31G X 8 MM MISC 1 each by Does not apply route daily 11/7/19  Yes [address], DO   insulin lispro (HUMALOG) 100 UNIT/ML injection vial Inject 70 Units into the skin 3 times daily (before meals) 11/7/19  Yes [address], DO   traZODone (DESYREL) 50 MG tablet Take 1 tablet by mouth nightly as needed for Sleep 11/7/19  Yes [address], DO   ipratropium-albuterol (DUONEB) 0.5-2.5 (3) MG/3ML SOLN nebulizer solution Inhale 3 mLs into the lungs every 4 hours as needed for Shortness of Breath 10/16/19  Yes Jennifer Alvarez, DO   diltiazem (CARDIZEM CD) 240 MG extended release capsule TAKE 1 CAPSULE BY MOUTH 2 TIMES DAILY 10/15/19  Yes Forrest Alvarez DO   metoprolol succinate (TOPROL XL) 200 MG extended release tablet Take 0.5 tablets by mouth 2 times daily 9/23/19  Yes Brian John DO   acetaminophen (TYLENOL) 325 MG tablet Take 650 mg by mouth every 6 hours as needed for Pain   Yes Historical Provider, MD   insulin glargine (LANTUS) 100 UNIT/ML injection vial Inject 100 Units into the skin nightly 12/5/18  Yes Jose Ventura MD   escitalopram (LEXAPRO) 20 MG tablet TAKE 1 TABLET BY MOUTH DAILY 6/6/18  Yes Forrest Alvarez DO   OXYGEN Inhale 4 L into the lungs continuous    Yes Historical Provider, MD   ULTICARE ALCOHOL SWABS 70 % PADS USE 3 TIMES A DAY 10/14/19   Historical Provider, MD   BD INSULIN SYRINGE U/F 31G X 5/16\" 1 ML MISC USE AS DIRECTED 10/18/19   Historical Provider, MD   miconazole (MICOTIN) 2 % powder Apply topically 2 times daily.   Patient not taking: Reported on 12/19/2019 9/24/19   Jozef Velázquez MD   glucose (GLUTOSE) 40 % GEL Take 37.5 mLs by mouth as needed (hypoglycemia) 12/20/18   RAUL Bourne - CNP   Elastic Bandages &

## 2019-12-25 NOTE — PLAN OF CARE
Problem: Falls - Risk of:  Goal: Will remain free from falls  Description  Will remain free from falls  12/25/2019 1518 by Mic Caban RN  Outcome: Met This Shift  12/25/2019 0148 by Anca Lozano RN  Outcome: Met This Shift  Goal: Absence of physical injury  Description  Absence of physical injury  12/25/2019 1518 by Mic Caban RN  Outcome: Met This Shift  12/25/2019 0148 by Anca Lozano RN  Outcome: Met This Shift     Problem: Risk for Impaired Skin Integrity  Goal: Tissue integrity - skin and mucous membranes  Description  Structural intactness and normal physiological function of skin and  mucous membranes.   12/25/2019 0148 by Anca Lozano RN  Outcome: Met This Shift     Problem: Pain:  Goal: Pain level will decrease  Description  Pain level will decrease  12/25/2019 1518 by Mic Caban RN  Outcome: Met This Shift  12/25/2019 0148 by Anca Lozano RN  Outcome: Met This Shift  Goal: Control of acute pain  Description  Control of acute pain  12/25/2019 1518 by Mic Caban RN  Outcome: Met This Shift  12/25/2019 0148 by Anca Lozano RN  Outcome: Met This Shift  Goal: Control of chronic pain  Description  Control of chronic pain  12/25/2019 1518 by Mic Caban RN  Outcome: Met This Shift  12/25/2019 0148 by Anca Lozano RN  Outcome: Met This Shift

## 2019-12-26 LAB
ALBUMIN SERPL-MCNC: 3.3 G/DL (ref 3.5–5.2)
ALP BLD-CCNC: 83 U/L (ref 35–104)
ALT SERPL-CCNC: 9 U/L (ref 0–32)
ANION GAP SERPL CALCULATED.3IONS-SCNC: 9 MMOL/L (ref 7–16)
AST SERPL-CCNC: 11 U/L (ref 0–31)
BASOPHILS ABSOLUTE: 0.02 E9/L (ref 0–0.2)
BASOPHILS RELATIVE PERCENT: 0.4 % (ref 0–2)
BILIRUB SERPL-MCNC: <0.2 MG/DL (ref 0–1.2)
BILIRUBIN DIRECT: <0.2 MG/DL (ref 0–0.3)
BILIRUBIN, INDIRECT: ABNORMAL MG/DL (ref 0–1)
BUN BLDV-MCNC: 14 MG/DL (ref 8–23)
CALCIUM SERPL-MCNC: 9.1 MG/DL (ref 8.6–10.2)
CHLORIDE BLD-SCNC: 98 MMOL/L (ref 98–107)
CO2: 30 MMOL/L (ref 22–29)
CREAT SERPL-MCNC: 0.7 MG/DL (ref 0.5–1)
EOSINOPHILS ABSOLUTE: 0.14 E9/L (ref 0.05–0.5)
EOSINOPHILS RELATIVE PERCENT: 2.7 % (ref 0–6)
GFR AFRICAN AMERICAN: >60
GFR NON-AFRICAN AMERICAN: >60 ML/MIN/1.73
GLUCOSE BLD-MCNC: 319 MG/DL (ref 74–99)
HCT VFR BLD CALC: 33.9 % (ref 34–48)
HEMOGLOBIN: 10.5 G/DL (ref 11.5–15.5)
IMMATURE GRANULOCYTES #: 0.02 E9/L
IMMATURE GRANULOCYTES %: 0.4 % (ref 0–5)
LYMPHOCYTES ABSOLUTE: 1.55 E9/L (ref 1.5–4)
LYMPHOCYTES RELATIVE PERCENT: 29.7 % (ref 20–42)
MAGNESIUM: 1.8 MG/DL (ref 1.6–2.6)
MCH RBC QN AUTO: 26.3 PG (ref 26–35)
MCHC RBC AUTO-ENTMCNC: 31 % (ref 32–34.5)
MCV RBC AUTO: 85 FL (ref 80–99.9)
METER GLUCOSE: 124 MG/DL (ref 74–99)
METER GLUCOSE: 207 MG/DL (ref 74–99)
METER GLUCOSE: 253 MG/DL (ref 74–99)
METER GLUCOSE: 259 MG/DL (ref 74–99)
METER GLUCOSE: 285 MG/DL (ref 74–99)
MONOCYTES ABSOLUTE: 0.57 E9/L (ref 0.1–0.95)
MONOCYTES RELATIVE PERCENT: 10.9 % (ref 2–12)
NEUTROPHILS ABSOLUTE: 2.92 E9/L (ref 1.8–7.3)
NEUTROPHILS RELATIVE PERCENT: 55.9 % (ref 43–80)
PDW BLD-RTO: 15 FL (ref 11.5–15)
PLATELET # BLD: 200 E9/L (ref 130–450)
PMV BLD AUTO: 10 FL (ref 7–12)
POTASSIUM REFLEX MAGNESIUM: 4 MMOL/L (ref 3.5–5)
RBC # BLD: 3.99 E12/L (ref 3.5–5.5)
SODIUM BLD-SCNC: 137 MMOL/L (ref 132–146)
TOTAL PROTEIN: 6 G/DL (ref 6.4–8.3)
WBC # BLD: 5.2 E9/L (ref 4.5–11.5)

## 2019-12-26 PROCEDURE — 2580000003 HC RX 258: Performed by: INTERNAL MEDICINE

## 2019-12-26 PROCEDURE — 6370000000 HC RX 637 (ALT 250 FOR IP): Performed by: INTERNAL MEDICINE

## 2019-12-26 PROCEDURE — 6360000002 HC RX W HCPCS: Performed by: INTERNAL MEDICINE

## 2019-12-26 PROCEDURE — 2700000000 HC OXYGEN THERAPY PER DAY

## 2019-12-26 PROCEDURE — 83735 ASSAY OF MAGNESIUM: CPT

## 2019-12-26 PROCEDURE — 99232 SBSQ HOSP IP/OBS MODERATE 35: CPT | Performed by: INTERNAL MEDICINE

## 2019-12-26 PROCEDURE — 80048 BASIC METABOLIC PNL TOTAL CA: CPT

## 2019-12-26 PROCEDURE — 97530 THERAPEUTIC ACTIVITIES: CPT

## 2019-12-26 PROCEDURE — 1200000000 HC SEMI PRIVATE

## 2019-12-26 PROCEDURE — 97162 PT EVAL MOD COMPLEX 30 MIN: CPT

## 2019-12-26 PROCEDURE — 82962 GLUCOSE BLOOD TEST: CPT

## 2019-12-26 PROCEDURE — 36415 COLL VENOUS BLD VENIPUNCTURE: CPT

## 2019-12-26 PROCEDURE — 85025 COMPLETE CBC W/AUTO DIFF WBC: CPT

## 2019-12-26 PROCEDURE — 80076 HEPATIC FUNCTION PANEL: CPT

## 2019-12-26 PROCEDURE — 97165 OT EVAL LOW COMPLEX 30 MIN: CPT

## 2019-12-26 PROCEDURE — 2500000003 HC RX 250 WO HCPCS: Performed by: INTERNAL MEDICINE

## 2019-12-26 RX ORDER — KETOROLAC TROMETHAMINE 30 MG/ML
15 INJECTION, SOLUTION INTRAMUSCULAR; INTRAVENOUS ONCE
Status: COMPLETED | OUTPATIENT
Start: 2019-12-26 | End: 2019-12-26

## 2019-12-26 RX ORDER — KETOROLAC TROMETHAMINE 30 MG/ML
30 INJECTION, SOLUTION INTRAMUSCULAR; INTRAVENOUS ONCE
Status: DISCONTINUED | OUTPATIENT
Start: 2019-12-26 | End: 2019-12-26

## 2019-12-26 RX ADMIN — OMEGA-3-ACID ETHYL ESTERS 2 G: 900 CAPSULE, LIQUID FILLED ORAL at 20:11

## 2019-12-26 RX ADMIN — PANTOPRAZOLE SODIUM 40 MG: 40 TABLET, DELAYED RELEASE ORAL at 06:14

## 2019-12-26 RX ADMIN — METOPROLOL SUCCINATE 100 MG: 100 TABLET, EXTENDED RELEASE ORAL at 09:17

## 2019-12-26 RX ADMIN — DILTIAZEM HYDROCHLORIDE 240 MG: 240 CAPSULE, COATED, EXTENDED RELEASE ORAL at 09:17

## 2019-12-26 RX ADMIN — OMEGA-3-ACID ETHYL ESTERS 2 G: 900 CAPSULE, LIQUID FILLED ORAL at 09:33

## 2019-12-26 RX ADMIN — GABAPENTIN 100 MG: 100 CAPSULE ORAL at 20:10

## 2019-12-26 RX ADMIN — KETOROLAC TROMETHAMINE 15 MG: 30 INJECTION, SOLUTION INTRAMUSCULAR at 18:28

## 2019-12-26 RX ADMIN — ENOXAPARIN SODIUM 40 MG: 40 INJECTION SUBCUTANEOUS at 09:16

## 2019-12-26 RX ADMIN — MICONAZOLE NITRATE: 20 POWDER TOPICAL at 09:20

## 2019-12-26 RX ADMIN — SODIUM CHLORIDE, PRESERVATIVE FREE 10 ML: 5 INJECTION INTRAVENOUS at 09:16

## 2019-12-26 RX ADMIN — INSULIN LISPRO 90 UNITS: 100 INJECTION, SOLUTION INTRAVENOUS; SUBCUTANEOUS at 12:26

## 2019-12-26 RX ADMIN — ESCITALOPRAM OXALATE 20 MG: 10 TABLET ORAL at 09:17

## 2019-12-26 RX ADMIN — ACETAMINOPHEN 650 MG: 325 TABLET ORAL at 09:16

## 2019-12-26 RX ADMIN — INSULIN GLARGINE 100 UNITS: 100 INJECTION, SOLUTION SUBCUTANEOUS at 20:07

## 2019-12-26 RX ADMIN — GABAPENTIN 100 MG: 100 CAPSULE ORAL at 15:25

## 2019-12-26 RX ADMIN — DOXYCYCLINE HYCLATE 100 MG: 100 CAPSULE ORAL at 09:17

## 2019-12-26 RX ADMIN — KETOROLAC TROMETHAMINE 15 MG: 30 INJECTION, SOLUTION INTRAMUSCULAR; INTRAVENOUS at 05:12

## 2019-12-26 RX ADMIN — SODIUM CHLORIDE, PRESERVATIVE FREE 10 ML: 5 INJECTION INTRAVENOUS at 20:13

## 2019-12-26 RX ADMIN — GABAPENTIN 100 MG: 100 CAPSULE ORAL at 09:17

## 2019-12-26 RX ADMIN — ACETAMINOPHEN 650 MG: 325 TABLET ORAL at 21:27

## 2019-12-26 RX ADMIN — METOPROLOL SUCCINATE 100 MG: 100 TABLET, EXTENDED RELEASE ORAL at 20:18

## 2019-12-26 RX ADMIN — SPIRONOLACTONE 25 MG: 25 TABLET ORAL at 09:17

## 2019-12-26 RX ADMIN — INSULIN LISPRO 2 UNITS: 100 INJECTION, SOLUTION INTRAVENOUS; SUBCUTANEOUS at 20:07

## 2019-12-26 RX ADMIN — ACETAMINOPHEN 650 MG: 325 TABLET ORAL at 03:30

## 2019-12-26 RX ADMIN — INSULIN LISPRO 2 UNITS: 100 INJECTION, SOLUTION INTRAVENOUS; SUBCUTANEOUS at 12:23

## 2019-12-26 RX ADMIN — SODIUM CHLORIDE, PRESERVATIVE FREE 10 ML: 5 INJECTION INTRAVENOUS at 18:29

## 2019-12-26 RX ADMIN — CEPHALEXIN 500 MG: 500 CAPSULE ORAL at 15:25

## 2019-12-26 RX ADMIN — MONTELUKAST SODIUM 10 MG: 10 TABLET, FILM COATED ORAL at 20:10

## 2019-12-26 RX ADMIN — DOXYCYCLINE HYCLATE 100 MG: 100 CAPSULE ORAL at 20:11

## 2019-12-26 RX ADMIN — APIXABAN 5 MG: 5 TABLET, FILM COATED ORAL at 20:10

## 2019-12-26 RX ADMIN — INSULIN LISPRO 50 UNITS: 100 INJECTION, SOLUTION INTRAVENOUS; SUBCUTANEOUS at 06:15

## 2019-12-26 RX ADMIN — ACETAMINOPHEN 650 MG: 325 TABLET ORAL at 16:47

## 2019-12-26 RX ADMIN — INSULIN LISPRO 3 UNITS: 100 INJECTION, SOLUTION INTRAVENOUS; SUBCUTANEOUS at 09:14

## 2019-12-26 RX ADMIN — CEPHALEXIN 500 MG: 500 CAPSULE ORAL at 06:33

## 2019-12-26 RX ADMIN — MICONAZOLE NITRATE: 20 POWDER TOPICAL at 20:12

## 2019-12-26 RX ADMIN — CEPHALEXIN 500 MG: 500 CAPSULE ORAL at 21:27

## 2019-12-26 ASSESSMENT — PAIN DESCRIPTION - DESCRIPTORS
DESCRIPTORS: DISCOMFORT
DESCRIPTORS: DISCOMFORT;STABBING;THROBBING
DESCRIPTORS: DISCOMFORT
DESCRIPTORS: ACHING;DISCOMFORT
DESCRIPTORS: THROBBING;STABBING

## 2019-12-26 ASSESSMENT — PAIN DESCRIPTION - ORIENTATION
ORIENTATION: RIGHT

## 2019-12-26 ASSESSMENT — PAIN SCALES - GENERAL
PAINLEVEL_OUTOF10: 3
PAINLEVEL_OUTOF10: 7
PAINLEVEL_OUTOF10: 8
PAINLEVEL_OUTOF10: 3
PAINLEVEL_OUTOF10: 1
PAINLEVEL_OUTOF10: 8
PAINLEVEL_OUTOF10: 0
PAINLEVEL_OUTOF10: 3
PAINLEVEL_OUTOF10: 2
PAINLEVEL_OUTOF10: 0
PAINLEVEL_OUTOF10: 3
PAINLEVEL_OUTOF10: 3

## 2019-12-26 ASSESSMENT — PAIN DESCRIPTION - PAIN TYPE
TYPE: ACUTE PAIN

## 2019-12-26 ASSESSMENT — PAIN DESCRIPTION - FREQUENCY
FREQUENCY: CONTINUOUS
FREQUENCY: INTERMITTENT
FREQUENCY: CONTINUOUS
FREQUENCY: CONTINUOUS
FREQUENCY: INTERMITTENT

## 2019-12-26 ASSESSMENT — PAIN DESCRIPTION - PROGRESSION
CLINICAL_PROGRESSION: NOT CHANGED

## 2019-12-26 ASSESSMENT — PAIN DESCRIPTION - LOCATION
LOCATION: LEG
LOCATION: FOOT
LOCATION: LEG
LOCATION: LEG
LOCATION: FOOT

## 2019-12-26 ASSESSMENT — PAIN DESCRIPTION - ONSET
ONSET: ON-GOING

## 2019-12-26 NOTE — PROGRESS NOTES
4 to person, place, month/year, and situation. Sensation: Pt denies numbness and tingling of extremities. Coordination: NT  Edema: B LE swelling noted. ASSESSMENT    Patient education  Pt educated on hand placement during sit to stand transfer. Patient response to education:   Pt verbalized understanding Pt demonstrated skill Pt requires further education in this area   Yes No  Yes     Comments:  Pt was supine in bed upon room entry, agreeable to PT evaluation with OT collaboration. Pt required assistance for R LE and trunk mobility during supine to sit transfer. Pt was unable to push from bed during supine to sit transfer. Pt pivoted to bariatric bed and was seated. Pt was assisted to supine position. Pt able to roll to R side as lali pads were rearranged. Pt was left supine in bed with all needs met at conclusion of session. Pts/family goals: To go to rehab. Patient and or family understand(s) diagnosis, prognosis, and plan of care:  Yes. PLAN  PT care will be provided in accordance with the objectives noted above. Whenever appropriate, clear delegation orders will be provided for nursing staff. Exercises and functional mobility practice will be used as well as appropriate assistive devices or modalities to obtain goals. Patient and family education will also be administered as needed. Frequency of treatments: 2-5x/week x 3-5 days.     Time in: 1500  Time out: [address] [facility] ADALBERTO [place], [place]  [id number]

## 2019-12-26 NOTE — PROGRESS NOTES
normocephalic and atraumatic  Eyes: pupils equal, round, and reactive to light, extraocular eye movements intact, conjunctivae normal  Neck: supple and non-tender without mass  Pulmonary/Chest: clear to auscultation bilaterally  Cardiovascular: normal rate, regular rhythm, normal S1 and S2  Abdomen: soft, non-tender, non-distended, normal bowel sounds, no masses or organomegaly  Extremities: no cyanosis, clubbing , chronic bipedal lymphedema with stasis changes and some erythema  Musculoskeletal: normal range of motion, no joint swelling, deformity or tenderness  Neurologic:  no cranial nerve deficit,speech normal      Recent Labs     12/24/19  1657  12/25/19  0725 12/25/19  1633 12/26/19  0413   *  --  136  --  137   K 5.7*  --  3.6  --  4.0   CL 88*  --  95*  --  98   CO2 27  --  33*  --  30*   BUN 20  --  14  --  14   CREATININE 0.7  --  0.7  --  0.7   GLUCOSE 552*   < > 261* 416* 319*   CALCIUM 8.7  --  9.1  --  9.1    < > = values in this interval not displayed. Recent Labs     12/24/19  1657 12/25/19  0725 12/26/19  0413   WBC 4.7 5.7 5.2   RBC 3.77 3.99 3.99   HGB 10.0* 10.3* 10.5*   HCT 31.7* 33.4* 33.9*   MCV 84.1 83.7 85.0   MCH 26.5 25.8* 26.3   MCHC 31.5* 30.8* 31.0*   RDW 15.0 14.7 15.0    214 200   MPV 10.8 10.0 10.0     Labs and images reviewed     Radiology:   XR ANKLE RIGHT (MIN 3 VIEWS)   Final Result    IMPRESSION:      Findings concerning for stress fracture at the distal tibial and   fibular shafts. Clinical correlation for point tenderness is   recommended. XR ANKLE LEFT (MIN 3 VIEWS)   Final Result      Limited exam. No definite evidence of acute osseous abnormality. Severe ankle osteoarthritis.           Assessment:    Principal Problem:    Stress fracture of right fibula  Active Problems:    Anxiety and depression    Chronic anticoagulation    Diabetic neuropathy (HCC)    Chronic diastolic heart failure (HCC)    Lymphedema of both lower extremities    Chronic

## 2019-12-26 NOTE — HOME CARE
Patient current with [facility] for SN. Will need BONIFACIO orders if appropriate at discharge.   Pablo Ceron LPN  [facility]

## 2019-12-26 NOTE — DISCHARGE INSTR - COC
Continuity of Care Form    Patient Name: Ghassan oFley   :  1955  MRN:  [id number]    Admit date:  2019  Discharge date:  2019    Code Status Order: Full Code   Advance Directives:   [facility] Documentation     Date/Time Healthcare Directive Type of Healthcare Directive Copy in [facility] [address] Agent's Name Healthcare Agent's Phone Number    19 0726  No, patient does not have an advance directive for healthcare treatment -- -- -- -- --          Admitting Physician:  Bong Shin MD  PCP: Garland De La Cruz DO    Discharging Nurse: Electronically signed by Yumiko Navarro RN on 2019 at 11:44 AM  [address] Unit/Room#: 0521/0521-A  Discharging Unit Phone Number: [phone]    Emergency Contact:   Extended Emergency Contact Information  Primary Emergency Contact: Nicolas Betancur  Address: [facility] [address] [facility] Phone: [phone]  Mobile Phone: [phone]  Relation: Child   needed? No  Secondary Emergency Contact: Thee Betancur  Address: [address] [place] [address] [address] [facility] Phone: [phone]  Mobile Phone: [phone]  Relation: Child   needed? No    Past Surgical History:  Past Surgical History:   Procedure Laterality Date    ANOSCOPY  10/25/2006    injection of anal sphincter with Botox, Franklyn Ortiz/Timmy, [facility]    ANOSCOPY  2007    injection of anal sphincter with Botox, Dr. Alvaro Daley, [address] [place] [address] ANOSCOPY  2007    injection of anal sphincter with Botox, Franklyn Anaya [facility]    ANUS SURGERY  2006    Lateral [facility]  [facility]    anal exam and injection of Botox 100 units into anal sphincter, Laila Anaya, [facility]   [place] COLONOSCOPY  2004 PM   Wound Assessment Other (Comment) 12/25/2019  8:00 PM   Drainage Amount Moderate 12/25/2019  1:15 PM   Drainage Description Yellow;Serous 12/25/2019  1:15 PM   Odor Mild 12/25/2019  1:15 PM   Ely-wound Assessment Swelling 12/25/2019  8:00 PM   Number of days: 1        Elimination:  Continence:   · Bowel: NO  · Bladder: NO  Urinary Catheter: None   Colostomy/Ileostomy/Ileal Conduit: No       Date of Last BM: 12/26/19    Intake/Output Summary (Last 24 hours) at 12/26/2019 1403  Last data filed at 12/26/2019 1330  Gross per 24 hour   Intake 660 ml   Output 1950 ml   Net -1290 ml     I/O last 3 completed shifts: In: 5 [P.O.:420]  Out: 1950 [Urine:1950]    Safety Concerns: At Risk for Falls    Impairments/Disabilities:      None    Nutrition Therapy:  Current Nutrition Therapy:   ORAL DIET and Carb Control 4 carbs/meal (1800kcals/day); Low Na    Routes of Feeding: Oral  Liquids: Thin Liquids  Daily Fluid Restriction: no  Last Modified Barium Swallow with Video (Video Swallowing Test): not done    Treatments at the Time of Hospital Discharge:   Respiratory Treatments: see MAR  Oxygen Therapy:  is on oxygen at 3 L/min per nasal cannula. Ventilator:    - No ventilator support    Rehab Therapies: Physical Therapy and Occupational Therapy  Weight Bearing Status/Restrictions: No weight bearing restirctions  Other Medical Equipment (for information only, NOT a DME order):  walker  Other Treatments: Change dressing to right leg daily.  Cleanse with NSS, apply opticell over draining areas, apply ABD, kerlex, and wrap with ace bandage.     Patient's personal belongings (please select all that are sent with patient):  Kyra    RN SIGNATURE:  Electronically signed by Edis Estrada RN on 12/27/19 at 11:46 AM    CASE MANAGEMENT/SOCIAL WORK SECTION    Inpatient Status Date: ***    Readmission Risk Assessment Score:  Readmission Risk              Risk of Unplanned Readmission:        24           Discharging to Facility/ Agency   · Name: [facility]  Address:   [address] [facility] [address]         Phone: [phone]       Fax: [phone]        / signature: Electronically signed by Robb Stewart on 12/26/19 at 3:00 PM    PHYSICIAN SECTION    Prognosis: {Prognosis:9965024802}    Condition at Discharge: Stable    Rehab Potential (if transferring to Rehab): Fair    Recommended Labs or Other Treatments After Discharge: ***    Physician Certification: I certify the above information and transfer of Noe Beth  is necessary for the continuing treatment of the diagnosis listed and that she requires Intermediate Nursing Care for greater 30 days.      Update Admission H&P: {CHP DME Changes in NRQIU:[id number]}    PHYSICIAN SIGNATURE:  Electronically signed by Cl Gamino DO on 12/27/19 at 12:21 PM

## 2019-12-26 NOTE — CARE COORDINATION
Introduced my self and provided explanation of CM role to patient. Patient is awake, alert, and aware of current diagnosis and treatment plan including wound management, ID consult, ortho consult, pending pt/ot evals. Patient states she lives at home with her  and 2 grand children. States  is impaired following his own CVA. Patient states she has a walker, bsc and O2 at home (from [facility]). She does not feel she could manage at home and after choices offered she s requested a referral to [facility]. Same completed to Cl Ribeiro, liaison with same facility. Explained ELOS of 24 hours; patient voiced understanding and agreement. Will follow along with  and assist with discharge planning as necessary. The Plan for Transition of Care is related to the following treatment goals: pt/ot    The Patient and/or patient representative Eliseo Pedersen was provided with a choice of provider and agrees   with the discharge plan. [x] Yes [] No    Freedom of choice list was provided with basic dialogue that supports the patient's individualized plan of care/goals, treatment preferences and shares the quality data associated with the providers. [x] Yes [] No    Zenon Reddy, MSN, RN  [facility] Case Management  [phone]

## 2019-12-26 NOTE — PROGRESS NOTES
Occupational Therapy  OCCUPATIONAL THERAPY INITIAL EVALUATION      Date:2019  Patient Name: Vangie Fragoso  MRN: [id number]  : 1955  Room: [address] [place], [place] [address]    Evaluating OT: Justa Tilley OTR/L [id number]    AM-PAC Daily Activity Raw Score:     Recommended Adaptive Equipment: TBD    Diagnosis: cellulitis. Imaging: x-ray R ankle concerning for stress fracture distal tibial and fibular shafts, per ortho WBAT no restrictions  Pertinent Medical History: HTN, CHF, COPD, afib   Precautions:  Falls, WBAT R LE, O2     Home Living: Pt lives with family in a 2 story home with 1st floor set up with ramp on entry. Bathroom setup: unable to utilize upstairs bathroom due to space constraints. Use of BSC on 1st floor, sponge bathes     Prior Level of Function: assist from family with ADLs, family manages all IADLs; completed functional mobility with rollator household distances. WC for out of home. Pts family propels wheelchair    Pain Level: 4/10 B feet    Cognition: A&O: 4/4. Problem solving:  Fair   Judgement/safety:  Fair     Functional Assessment:   Initial Eval Status  Date: 19 Treatment session:  Short Term Goals  Treatment frequency: 2-5x/wk PRN x1-3 wks   Feeding Independent  Holding cup to drink     Grooming Min A  Set up   UB Dressing Min A  Set up   LB Dressing Max A  Donning brief  Unable to thread either foot, minimal assist hiking brief over hips when standing  Min A    Bathing Max A  Min A   Toileting Max A  Min A   Bed Mobility  Supine to sit: Mod A     Functional Transfers STS: Mod A  SBA   Functional Mobility Min A with WW  Small steps in room  Limited further due to pain reported from patient  SBA during ADLs   Balance Sitting: fair    Standing: fair minus at [employer]     Activity Tolerance Poor plus. 4L O2 donned throughout.  Limited by pain  standing january x6-7 min with fair plus balance during self care tasks              Strength ROM Additional Info:    RUE  Proximally: 3-/5  Distally: 4-/5 WFL elbow to digits. Shoulder flexion approx 90 degrees good  and FMC/dexterity noted during ADL tasks     LUE Proximally: 3-/5  Distally: 4-/5 WFL elbow to digits. Shoulder flexion approx 90 degrees good  and FMC/dexterity noted during ADL tasks         Hearing: WFL   Vision: WFL   Sensation:  No c/o numbness or tingling   Tone: WFL   Edema: B LEs                            Comments/Treatment: Patient educated on adapted techniques for completion of ADL, safe functional transfers and mobility. Patient required cues for follow through with proper hand/foot placement, pacing, safety, attention, sequencing and technique in bed mobility, functional transfers, functional mobility and LB dressing in preparation for maximum independence in all self care tasks. Eval Complexity: Low    Assessment of current deficits   Functional mobility [x]  ADLs [x] Strength [x]  Cognition []  Functional transfers  [x] IADLs [x] Safety Awareness [x]  Endurance [x]  Fine Motor Coordination [] Balance [x] Vision/perception [] Sensation []   Gross Motor Coordination [] ROM [] Delirium []                  Motor Control []    Plan of Care:   ADL retraining [x]   Equipment needs [x]   Neuromuscular re-education [x] Energy Conservation Techniques [x]  Functional Transfer training [x] Patient and/or Family Education [x]  Functional Mobility training [x]  Environmental Modifications [x]  Cognitive re-training []   Compensatory techniques for ADLs [x]  Splinting Needs []   Positioning to improve overall function [x]   Therapeutic Activity [x]   Therapeutic Exercise  [x]  Visual/Perceptual: []    Delirium prevention/treatment  []   Other:  []    Rehab Potential: Good for established goals     Patient / Family Goal: To go to rehab. Patient and/or family were instructed on functional diagnosis, prognosis/goals and OT plan of care. Pt verbalized good understanding. Upon arrival, patient supine in bed.  At end of session, patient supine in new bariatric air mattress with call light and phone within reach, all lines and tubes intact. Pt would benefit from continued skilled OT to increase safety and independence with completion of ADL/IADL tasks for functional independence and quality of life.     Low Evaluation + 0 timed treatment minutes    Evaluation time includes thorough review of current medical information, gathering information on past medical history/social history and prior level of function, completion of standardized testing/informal observation of tasks, assessment of data, and development of POC/Goals    Silviano Biggs OTR/L  [id number]

## 2019-12-26 NOTE — PROGRESS NOTES
Physical Therapy    PT order received and medical chart reviewed 12/26 AM. Awaiting ortho recommendations/POC. Will follow. Thank you.     Tasha Moreno, PT, DPT  [id number]

## 2019-12-27 VITALS
OXYGEN SATURATION: 96 % | RESPIRATION RATE: 20 BRPM | BODY MASS INDEX: 51.91 KG/M2 | WEIGHT: 293 LBS | HEIGHT: 63 IN | DIASTOLIC BLOOD PRESSURE: 59 MMHG | HEART RATE: 84 BPM | TEMPERATURE: 97.8 F | SYSTOLIC BLOOD PRESSURE: 123 MMHG

## 2019-12-27 LAB
ANION GAP SERPL CALCULATED.3IONS-SCNC: 9 MMOL/L (ref 7–16)
BASOPHILS ABSOLUTE: 0.02 E9/L (ref 0–0.2)
BASOPHILS RELATIVE PERCENT: 0.4 % (ref 0–2)
BUN BLDV-MCNC: 18 MG/DL (ref 8–23)
CALCIUM SERPL-MCNC: 8.6 MG/DL (ref 8.6–10.2)
CHLORIDE BLD-SCNC: 95 MMOL/L (ref 98–107)
CO2: 29 MMOL/L (ref 22–29)
CREAT SERPL-MCNC: 0.7 MG/DL (ref 0.5–1)
EOSINOPHILS ABSOLUTE: 0.14 E9/L (ref 0.05–0.5)
EOSINOPHILS RELATIVE PERCENT: 3 % (ref 0–6)
GFR AFRICAN AMERICAN: >60
GFR NON-AFRICAN AMERICAN: >60 ML/MIN/1.73
GLUCOSE BLD-MCNC: 363 MG/DL (ref 74–99)
HCT VFR BLD CALC: 30.3 % (ref 34–48)
HEMOGLOBIN: 9.3 G/DL (ref 11.5–15.5)
IMMATURE GRANULOCYTES #: 0.02 E9/L
IMMATURE GRANULOCYTES %: 0.4 % (ref 0–5)
LYMPHOCYTES ABSOLUTE: 1.73 E9/L (ref 1.5–4)
LYMPHOCYTES RELATIVE PERCENT: 36.7 % (ref 20–42)
MCH RBC QN AUTO: 26.1 PG (ref 26–35)
MCHC RBC AUTO-ENTMCNC: 30.7 % (ref 32–34.5)
MCV RBC AUTO: 84.9 FL (ref 80–99.9)
METER GLUCOSE: 273 MG/DL (ref 74–99)
METER GLUCOSE: 309 MG/DL (ref 74–99)
MONOCYTES ABSOLUTE: 0.49 E9/L (ref 0.1–0.95)
MONOCYTES RELATIVE PERCENT: 10.4 % (ref 2–12)
NEUTROPHILS ABSOLUTE: 2.31 E9/L (ref 1.8–7.3)
NEUTROPHILS RELATIVE PERCENT: 49.1 % (ref 43–80)
PDW BLD-RTO: 14.9 FL (ref 11.5–15)
PLATELET # BLD: 181 E9/L (ref 130–450)
PMV BLD AUTO: 10.2 FL (ref 7–12)
POTASSIUM REFLEX MAGNESIUM: 4.2 MMOL/L (ref 3.5–5)
RBC # BLD: 3.57 E12/L (ref 3.5–5.5)
SODIUM BLD-SCNC: 133 MMOL/L (ref 132–146)
WBC # BLD: 4.7 E9/L (ref 4.5–11.5)

## 2019-12-27 PROCEDURE — 97530 THERAPEUTIC ACTIVITIES: CPT

## 2019-12-27 PROCEDURE — 2580000003 HC RX 258: Performed by: INTERNAL MEDICINE

## 2019-12-27 PROCEDURE — 82962 GLUCOSE BLOOD TEST: CPT

## 2019-12-27 PROCEDURE — 99239 HOSP IP/OBS DSCHRG MGMT >30: CPT | Performed by: INTERNAL MEDICINE

## 2019-12-27 PROCEDURE — 6370000000 HC RX 637 (ALT 250 FOR IP): Performed by: INTERNAL MEDICINE

## 2019-12-27 PROCEDURE — 80048 BASIC METABOLIC PNL TOTAL CA: CPT

## 2019-12-27 PROCEDURE — 2700000000 HC OXYGEN THERAPY PER DAY

## 2019-12-27 PROCEDURE — 85025 COMPLETE CBC W/AUTO DIFF WBC: CPT

## 2019-12-27 PROCEDURE — 36415 COLL VENOUS BLD VENIPUNCTURE: CPT

## 2019-12-27 PROCEDURE — 6360000002 HC RX W HCPCS: Performed by: INTERNAL MEDICINE

## 2019-12-27 RX ORDER — DOXYCYCLINE HYCLATE 100 MG/1
100 CAPSULE ORAL EVERY 12 HOURS SCHEDULED
Qty: 10 CAPSULE | Refills: 0 | DISCHARGE
Start: 2019-12-27 | End: 2020-01-01

## 2019-12-27 RX ORDER — CEPHALEXIN 500 MG/1
500 CAPSULE ORAL EVERY 8 HOURS SCHEDULED
Qty: 15 CAPSULE | Refills: 0 | DISCHARGE
Start: 2019-12-27 | End: 2020-01-01

## 2019-12-27 RX ORDER — DILTIAZEM HYDROCHLORIDE 240 MG/1
240 CAPSULE, COATED, EXTENDED RELEASE ORAL DAILY
Qty: 30 CAPSULE | Refills: 3 | DISCHARGE
Start: 2019-12-28 | End: 2020-01-24

## 2019-12-27 RX ADMIN — INSULIN LISPRO 50 UNITS: 100 INJECTION, SOLUTION INTRAVENOUS; SUBCUTANEOUS at 06:45

## 2019-12-27 RX ADMIN — ESCITALOPRAM OXALATE 20 MG: 10 TABLET ORAL at 08:01

## 2019-12-27 RX ADMIN — INSULIN LISPRO 50 UNITS: 100 INJECTION, SOLUTION INTRAVENOUS; SUBCUTANEOUS at 12:14

## 2019-12-27 RX ADMIN — INSULIN LISPRO 3 UNITS: 100 INJECTION, SOLUTION INTRAVENOUS; SUBCUTANEOUS at 12:13

## 2019-12-27 RX ADMIN — METOPROLOL SUCCINATE 100 MG: 100 TABLET, EXTENDED RELEASE ORAL at 08:01

## 2019-12-27 RX ADMIN — OMEGA-3-ACID ETHYL ESTERS 2 G: 900 CAPSULE, LIQUID FILLED ORAL at 08:01

## 2019-12-27 RX ADMIN — DILTIAZEM HYDROCHLORIDE 240 MG: 240 CAPSULE, COATED, EXTENDED RELEASE ORAL at 08:01

## 2019-12-27 RX ADMIN — INSULIN LISPRO 4 UNITS: 100 INJECTION, SOLUTION INTRAVENOUS; SUBCUTANEOUS at 08:02

## 2019-12-27 RX ADMIN — DOXYCYCLINE HYCLATE 100 MG: 100 CAPSULE ORAL at 08:01

## 2019-12-27 RX ADMIN — TRAZODONE HYDROCHLORIDE 50 MG: 50 TABLET ORAL at 03:43

## 2019-12-27 RX ADMIN — SODIUM CHLORIDE, PRESERVATIVE FREE 10 ML: 5 INJECTION INTRAVENOUS at 08:02

## 2019-12-27 RX ADMIN — ACETAMINOPHEN 650 MG: 325 TABLET ORAL at 03:43

## 2019-12-27 RX ADMIN — ENOXAPARIN SODIUM 40 MG: 40 INJECTION SUBCUTANEOUS at 08:02

## 2019-12-27 RX ADMIN — CEPHALEXIN 500 MG: 500 CAPSULE ORAL at 06:26

## 2019-12-27 RX ADMIN — APIXABAN 5 MG: 5 TABLET, FILM COATED ORAL at 08:01

## 2019-12-27 RX ADMIN — GABAPENTIN 100 MG: 100 CAPSULE ORAL at 08:01

## 2019-12-27 RX ADMIN — PANTOPRAZOLE SODIUM 40 MG: 40 TABLET, DELAYED RELEASE ORAL at 06:26

## 2019-12-27 RX ADMIN — MICONAZOLE NITRATE: 20 POWDER TOPICAL at 08:09

## 2019-12-27 RX ADMIN — SPIRONOLACTONE 25 MG: 25 TABLET ORAL at 08:01

## 2019-12-27 ASSESSMENT — PAIN SCALES - GENERAL
PAINLEVEL_OUTOF10: 0
PAINLEVEL_OUTOF10: 0
PAINLEVEL_OUTOF10: 7

## 2019-12-27 NOTE — CARE COORDINATION
In anticipation of potential discharge, [facility] scheduled for pickup at 2 PM today [phone] with [place] Bonsall form and face sheet faxed to [facility] [address].  Maureen, MSN, RN  [facility] Case Management  [phone]

## 2019-12-27 NOTE — CONSULTS
Diana, [address] [facility] COLONOSCOPY  3/23/2012    bx/cauterization proximal ascending colon polyp, injection of anal sphincter with Botox, Dr. Paulina Gong, [facility] JOINT REPLACEMENT  2003 [address] [place], SKIN, SUB-Q TISSUE,MUSCLE,=<20 SQ CM Left 11/30/2018    LEFT LEG EXCISIONAL  DEBRIDEMENT WITH TISSUE BIOPSY [facility] [address]  1/20/2004    gastritis, bx (no H pylori), Dr. Aldo Jiang, [address] ENDOSCOPY N/A 6/1/2018    EGD BIOPSY performed by Melisa Moe MD at [address] [facility] N/A 11/9/2018    EGD BIOPSY performed by Melisa Moe MD at [facility] ENDOSCOPY       Medications Prior to Admission:    Medications Prior to Admission: blood glucose test strips (ASCENSIA AUTODISC VI;ONE TOUCH ULTRA TEST VI) strip, 1 each by In Vitro route 4 times daily As needed. spironolactone (ALDACTONE) 25 MG tablet, Take 1 tablet by mouth daily  albuterol sulfate HFA (PROAIR HFA) 108 (90 Base) MCG/ACT inhaler, Inhale 2 puffs into the lungs every 6 hours as needed for Wheezing  gabapentin (NEURONTIN) 100 MG capsule, Take 1 capsule by mouth 3 times daily for 30 days. nystatin (MYCOSTATIN) 525668 UNIT/GM cream, Apply topically 2 times daily.   montelukast (SINGULAIR) 10 MG tablet, Take 1 tablet by mouth nightly  lansoprazole (PREVACID) 30 MG delayed release capsule, Take 1 capsule by mouth daily  ELIQUIS 5 MG TABS tablet, TAKE 1 TABLET BY MOUTH TWICE A DAY  ferrous sulfate 325 (65 Fe) MG tablet, TAKE 1 TABLET BY MOUTH EVERY DAY WITH BREAKFAST  vitamin D (ERGOCALCIFEROL) 1.25 MG (37161 UT) CAPS capsule, Take 1 capsule by mouth Twice a Week  Icosapent Ethyl (VASCEPA) 1 g CAPS capsule, Take 2 capsules by mouth 2 times daily  Insulin Pen Needle 31G X 8 MM MISC, 1 each by Does not apply route daily  insulin lispro (HUMALOG) 100 UNIT/ML injection vial, Inject 70 Units into the skin 3 anticoagulation    COPD (chronic obstructive pulmonary disease) (HCC)    Acute on chronic respiratory failure with hypoxia (HCC)    Blood loss anemia    Sepsis (HCC)    Cellulitis of left lower extremity    Uncontrolled type 2 diabetes mellitus with hyperglycemia (HCC)    Lymphedema    Septicemia (HCC)    Acute diastolic congestive heart failure (HCC)    Acute on chronic anemia    Morbid obesity (HCC)    Acute hypoxemic respiratory failure (HCC)    Acute on chronic diastolic heart failure (HCC)    Cellulitis    Stress fracture of right fibula    Atrial fibrillation, currently in sinus rhythm    Gastroesophageal reflux disease without esophagitis       PLAN:  A/P: No acute fractures seen. Previous healed fracture seen on xray. - WBAT  - PT/OT  - Follow up as needed    FELIX Strickland

## 2019-12-27 NOTE — PROGRESS NOTES
A/P: No acute fractures seen. Previous healed fracture seen on xray. - WBAT  - PT/OT  - Follow up as needed    FELIX Guy

## 2019-12-27 NOTE — PROGRESS NOTES
Physical Therapy  Treatment note     Name: Rosina Caban  : 1955  MRN: [id number]    Date of Service: 2019    Evaluating PT: Jonel Mendoza, PT, DPT [id number]    Room #:  0471/1024-V    Diagnosis: Cellulitis  Precautions: Fall risk, R distal tibia and fibula stress fxs, WBAT R LE, O2, bariatric bed  PMHx: Afib, COPD, DM, HTN, morbid obesity, chronic lymphedema    Pt lives with  and 2 grandsons in a 2 story house with ramped entry. Bed is on the first floor and bath is on the second floor. Pt sponge bathes on first floor as pt is unable to use stair lift to second floor full bath. Pt ambulated with rollator walker Mod Independent within home prior to admission. Pt used WC in the community and did not self propel. HPI: Pt presented to the ED on  for B LE swelling and pain. Pt has chronic lymphedema and wounds on B LE. Pt has home health nurses come to house 2x/week to dress wounds. Pt was having increased pain, drainage, and odor from LE. Pain was worse in R ankle. Imaging revealed R distal tibia and fibula stress fxs. Ortho cleared pt to WBAT R LE. Initial Evaluation  Date: 19 Treatment Date:  19 Short Term/ Long Term   Goals   AM-PAC 6 Clicks /50 79/89    Was pt agreeable to Eval/treatment? Yes yes    Does pt have pain? 4/10 B feet pain R distal LE pain with initial standing, non specific to to severity    Bed Mobility  Rolling: Min A  Supine to sit: Mod A  Sit to supine: Mod A  Scooting: SBA toward EOB NT Rolling: SBA  Supine to sit: SBA  Sit to supine: SBA  Scooting: SBA   Transfers Sit to stand:  Mod A  Stand to sit: Mod A  Stand pivot: Min A with [employer] Sit to stand; Min A  Stand to sit: Min A  Stand Pivot: NT Sit to stand: SBA  Stand to sit: SBA  Stand pivot: Supervision with [employer]   Ambulation   3 feet with [employer] with Min A 40 feet x 1 using [employer] for support SBA for balance 50 feet with [employer] with Supervision   Stair negotiation: NT NA NA   ROM B UE: Refer to OT note  B LE: [facility] Strength B UE: Refer to OT note  B LE: WFL     Balance Sitting EOB: SBA  Dynamic standing: Min A with [employer]  Sitting EOB: Supervision  Dynamic standing: Supervision with [employer]     Pt is alert and able to follow all instruction  Balance: fair dynamic using [employer] for support    Additional Comments: Adela during gait slow but consistent, no loss of balance or shortness of breath noted. Pt fatigued after activity. Pt was left in a bedside chair with call light in reach. Chair/bed alarm: chair alarm active    Treatment time: 10 minutes  Time out: 1100    Pt is making good progress toward established Physical Therapy goals as per increased functional mobility performed and exercise participation. Continue with physical therapy current plan of care.     Ryan Ceballos PTA   License Number: [id number]

## 2019-12-27 NOTE — PATIENT CARE CONFERENCE
P Quality Flow/Interdisciplinary Rounds Progress Note        Quality Flow Rounds held on December 27, 2019    Disciplines Attending:  Bedside Nurse, ,  and Nursing Unit [address] Harish was admitted on 12/24/2019  4:20 PM    Anticipated Discharge Date:  Expected Discharge Date: 12/27/19    Disposition:    Kavon Score:  Kavon Scale Score: 16    Readmission Risk              Risk of Unplanned Readmission:        24           Discussed patient goal for the day, patient clinical progression, and barriers to discharge.   The following Goal(s) of the Day/Commitment(s) have been identified:  Discharge - Obtain Order      Vangie Mckeon  December 27, 2019

## 2019-12-28 NOTE — DISCHARGE SUMMARY
[facility] EMERGENCY SERVICE Physician Discharge Summary       [address], DO  [address]  T[address]anchito  [facility] [address] [place] [address]  [phone]          Activity level: as january    Diet: No diet orders on file    Dispo:snf    Condition at discharge: fair        Patient ID:  Sravanthi Alcantara  [id number]  75 y.o.  1955    Admit date: 12/24/2019    Discharge date and time:  12/27/2019  9:59 PM    Admission Diagnoses: Principal Problem:    Stress fracture of right fibula  Active Problems:    Anxiety and depression    Chronic anticoagulation    Diabetic neuropathy (HCC)    Chronic diastolic heart failure (HCC)    Lymphedema of both lower extremities    Chronic anemia    COPD (chronic obstructive pulmonary disease) (HCC)    Uncontrolled type 2 diabetes mellitus with hyperglycemia (HCC)    Cellulitis    Atrial fibrillation, currently in sinus rhythm    Gastroesophageal reflux disease without esophagitis  Resolved Problems:    * No resolved hospital problems. *      Discharge Diagnoses: Principal Problem:    Stress fracture of right fibula  Active Problems:    Anxiety and depression    Chronic anticoagulation    Diabetic neuropathy (HCC)    Chronic diastolic heart failure (HCC)    Lymphedema of both lower extremities    Chronic anemia    COPD (chronic obstructive pulmonary disease) (HCC)    Uncontrolled type 2 diabetes mellitus with hyperglycemia (HCC)    Cellulitis    Atrial fibrillation, currently in sinus rhythm    Gastroesophageal reflux disease without esophagitis  Resolved Problems:    * No resolved hospital problems.  *    RLE cellulitis  B/l le lymphedema  Dm type 2 uncontrolled  htn   gerd  Atrial fib  Copd        Consults:  IP CONSULT TO ORTHOPEDIC SURGERY  IP CONSULT TO DIETITIAN  IP CONSULT TO INFECTIOUS DISEASES    Procedures: none    Hospital Course: Patient was admitted with Cellulitis [L03.90]  Cellulitis [L03.90]. Patient is a 75 y.o. morbidly obese female with PMH of (A. fib on Eliquis, COPD on home oxygen, HFpEF, GERD, diabetes, hypertension, morbid obesity and chronic lymphedema) presented to the ER in [facility] with few day history of worsening leg swelling and pain, more on the right side. Patient has history of lymphedema and venous stasis. Her daughter-in-law, who is a nurse assistant, comes usually to help wrap her legs.  She noticed that her legs were more swollen and red.  She also noticed some discharge that was concerning for infection.  Patient denies fever.  No nausea or vomiting.  She is diabetic on insulin and her sugar is usually controlled. · Denied any fever, nausea, vomiting, diarrhea, abdominal pain, blood in stool or black stool. Denied any chest pain or SOB, no recent lightheadedness or syncope       Pt seen and examined by consultants. Pt improved on iv abx. On day of discharge, pt denied fevers, chills,n/v. Discharge planning d/w pt. Time given for questions and all questions answered. Diabetes not a direct factor to cellulitis occurring. Discharge Exam:  Vitals:    12/27/19 0016 12/27/19 0615 12/27/19 0800 12/27/19 1234   BP: 115/86  132/77 (!) 123/59   Pulse: 85  86 84   Resp: 17  16 20   Temp: 98.3 °F (36.8 °C)  97.7 °F (36.5 °C) 97.8 °F (36.6 °C)   TempSrc: Oral  Oral Oral   SpO2:   96%    Weight:  (!) 302 lb 14.4 oz (137.4 kg)     Height:           Skin: warm and dry, no rash or erythema  Pulmonary/Chest: clear to auscultation bilaterally- no wheezes, rales or rhonchi, normal air movement, no respiratory distress  Cardiovascular: rhythm reg at rate of 88  Abdomen: soft, non-tender, non-distended, normal bowel sounds, no masses or organomegaly  Extremities: no cyanosis, no clubbing and pos for 2+ b/l le edema  I/O last 3 completed shifts: In: 660 [P.O.:660]  Out: 1800 [Urine:1800]  No intake/output data recorded.       LABS:  Recent Labs     12/25/19  0341 these medications    cephALEXin 500 MG capsule  Commonly known as:  KEFLEX  Take 1 capsule by mouth every 8 hours for 5 days     doxycycline hyclate 100 MG capsule  Commonly known as:  VIBRAMYCIN  Take 1 capsule by mouth every 12 hours for 5 days        CHANGE how you take these medications    diltiazem 240 MG extended release capsule  Commonly known as:  CARDIZEM CD  Take 1 capsule by mouth daily  Start taking on:  December 28, 2019  What changed:    · how much to take  · how to take this  · when to take this  · additional instructions     insulin lispro 100 UNIT/ML injection vial  Commonly known as:  HUMALOG  Inject 50 Units into the skin 3 times daily (before meals)  What changed:  how much to take        CONTINUE taking these medications    acetaminophen 325 MG tablet  Commonly known as:  TYLENOL     albuterol sulfate  (90 Base) MCG/ACT inhaler  Commonly known as:  PROAIR HFA  Inhale 2 puffs into the lungs every 6 hours as needed for Wheezing     BD INSULIN SYRINGE U/F 31G X 5/16\" 1 ML Misc  Generic drug:  Insulin Syringe-Needle U-100     blood glucose test strips strip  Commonly known as:  ASCENSIA AUTODISC VI;ONE TOUCH ULTRA TEST VI  1 each by In Vitro route 4 times daily As needed. Elastic Bandages & Supports Misc  20-30 mmHg bilateral compression stockings  Size to fit  Dx:  Edema  Knee high     ELIQUIS 5 MG Tabs tablet  Generic drug:  apixaban  TAKE 1 TABLET BY MOUTH TWICE A DAY     escitalopram 20 MG tablet  Commonly known as:  LEXAPRO  TAKE 1 TABLET BY MOUTH DAILY     ferrous sulfate 325 (65 Fe) MG tablet  TAKE 1 TABLET BY MOUTH EVERY DAY WITH BREAKFAST     gabapentin 100 MG capsule  Commonly known as:  NEURONTIN  Take 1 capsule by mouth 3 times daily for 30 days.      glucose 40 % Gel  Commonly known as:  GLUTOSE  Take 37.5 mLs by mouth as needed (hypoglycemia)     Icosapent Ethyl 1 g Caps capsule  Commonly known as:  VASCEPA  Take 2 capsules by mouth 2 times daily     insulin glargine 100

## 2019-12-29 LAB
BLOOD CULTURE, ROUTINE: NORMAL
CULTURE, BLOOD 2: NORMAL

## 2019-12-30 ENCOUNTER — CARE COORDINATION (OUTPATIENT)
Dept: CARE COORDINATION | Age: 64
End: 2019-12-30

## 2020-01-06 ENCOUNTER — CARE COORDINATION (OUTPATIENT)
Dept: FAMILY MEDICINE CLINIC | Age: 65
End: 2020-01-06

## 2020-01-13 ENCOUNTER — CARE COORDINATION (OUTPATIENT)
Dept: CARE COORDINATION | Age: 65
End: 2020-01-13

## 2020-01-13 NOTE — CARE COORDINATION
PABLO left a voice message with contact information for Alesha Alvarez at [facility] requesting a return call to obtain an update on patient and to discuss discharge plan. PLAN  Continue to attempt to contact [facility] to determine discharge plan.

## 2020-01-15 ENCOUNTER — CARE COORDINATION (OUTPATIENT)
Dept: CARE COORDINATION | Age: 65
End: 2020-01-15

## 2020-01-17 ENCOUNTER — CARE COORDINATION (OUTPATIENT)
Dept: CARE COORDINATION | Age: 65
End: 2020-01-17

## 2020-01-17 RX ORDER — IBUPROFEN 200 MG
200 TABLET ORAL EVERY 6 HOURS PRN
COMMUNITY
End: 2020-01-23

## 2020-01-17 ASSESSMENT — ENCOUNTER SYMPTOMS: DYSPNEA ASSOCIATED WITH: MINIMAL EXERTION

## 2020-01-17 NOTE — CARE COORDINATION
Ambulatory Care Coordination Note  1/17/2020  CM Risk Score: 16  Charlson 10 Year Mortality Risk Score: 100%     ACC: Ann Simpson, BERNABE    Summary Note: ***        Care Coordination Interventions    Program Enrollment:  Complex Care  Referral from Primary Care Provider:  No  Suggested Interventions and Community Resources  Disease Association: In Process (Comment: 12/19/19: ACM to make referral to [facility])  [address]:  Completed (Comment: 12/19/19: Patient currently active with [facility] for [address] to wrap legs 2x/week)  Medi Set or Pill Pack: In Process (Comment: 12/19/19: ACM to refer patient to [employer])  Registered Dietician: In Process (Comment: 12/19/19: ACM to send information on how to contact \"Dial a Dietician\")  Social Work:  In Process (Comment: 12/19/19: ACM to refer to LSW to assist with Passport Application)  Transportation Support: In Process  Zone Management Tools: In Process (Comment: 12/19/19: ACM to send Zone tools for DM, CHF, and COPD)         Goals Addressed                 This Visit's Progress       Patient Stated     Patient Stated (pt-stated)   On track     I will allow ACM to make a referral to [facility] program    Barriers: lack of education  Plan for overcoming my barriers: Care Coordination, ACM to contact [facility] RN to make referral to program  Confidence: 8/10  Anticipated Goal Completion Date: 02/19/2020         Other     COMPLETED: Community Resource Goal        I will complete referral to [facility] for assistance. Barriers: lack of education  Plan for overcoming my barriers: Care Coordination, ACM to refer patient to LSW to assist with referral for Passport services  Confidence: 7/10  Anticipated Goal Completion Date: 02/19/2020 01/17/20: Update: Passport saw patient in the facility, she does not want services at this time.  Goal will overcoming my barriers: Care Coordination, [facility] to provide education on the importance of monitoring and documenting blood sugar and weight results consistently  Confidence: 6/10  Anticipated Goal Completion Date: 02/19/2020              Prior to Admission medications    Medication Sig Start Date End Date Taking? Authorizing Provider   insulin lispro (HUMALOG) 100 UNIT/ML injection vial Inject 50 Units into the skin 3 times daily (before meals) 12/27/19  Yes Ezekiel Mcgee DO   diltiazem (CARDIZEM CD) 240 MG extended release capsule Take 1 capsule by mouth daily 12/28/19  Yes Ezekiel Mcgee DO   nystatin (MYCOSTATIN) 051232 UNIT/GM cream Apply topically 2 times daily. 12/7/19  Yes Johanna Keene, DO   montelukast (SINGULAIR) 10 MG tablet Take 1 tablet by mouth nightly 11/27/19  Yes Yannick Alvarez DO   lansoprazole (PREVACID) 30 MG delayed release capsule Take 1 capsule by mouth daily 11/27/19  Yes Lolis Alvarez DO   ELIQUIS 5 MG TABS tablet TAKE 1 TABLET BY MOUTH TWICE A DAY 11/23/19  Yes Yannick Alvarez DO   ferrous sulfate 325 (65 Fe) MG tablet TAKE 1 TABLET BY MOUTH EVERY DAY WITH BREAKFAST 11/23/19  Yes Yannick Alvarez DO   vitamin D (ERGOCALCIFEROL) 1.25 MG (05583 UT) CAPS capsule Take 1 capsule by mouth Twice a Week 11/25/19  Yes [address], DO   traZODone (DESYREL) 50 MG tablet Take 1 tablet by mouth nightly as needed for Sleep 11/7/19  Yes [address], DO   ipratropium-albuterol (DUONEB) 0.5-2.5 (3) MG/3ML SOLN nebulizer solution Inhale 3 mLs into the lungs every 4 hours as needed for Shortness of Breath 10/16/19  Yes Yannick Alvarez,    miconazole (MICOTIN) 2 % powder Apply topically 2 times daily.  9/24/19  Yes Tyler Gayle MD   metoprolol succinate (TOPROL XL) 200 MG extended release tablet Take 0.5 tablets by mouth 2 times daily 9/23/19  Yes Arely Jacques, DO   glucose (GLUTOSE) 40 % GEL Take 37.5 mLs by mouth as needed (hypoglycemia) 12/20/18  Yes Tasia Hampton

## 2020-01-18 NOTE — CARE COORDINATION
ACM contacted Janet Chapa at [facility] to determine when services (Lymphedema wraps) will start for this patient. Janet Chapa stated that they have had many referrals for Lymphedema Wraps and they would not be able to see patient until late next week. ACM explained that patient was discharged from SNF on 01/16/20, and she has orders for daily wraps. Janet Chapa stated that she will try to have patient seen as soon as possible. PLAN  F/U with patient and/or NELIDA to determine exact date services will begin.

## 2020-01-18 NOTE — CARE COORDINATION
ACM contacted patient to inform her that [facility] contacted [facility] and they should be contacting her next week. [facility] also informed patient that Citlaly Rojo from [place] will also be contacting her next week to schedule initial evaluation of her medications. [facility] also reviewed patient's shortness of breath on this outreach. Patient states she was feeling much better and breathing easier since she took her breathing treatment.

## 2020-01-18 NOTE — CARE COORDINATION
ACM contacted Sathya Silva from [facility] to inform her that patient was home from SNF. Sathya Silva stated that she will contact patient the beginning of next week to set up appointment for initial evaluation.

## 2020-01-18 NOTE — CARE COORDINATION
Ambulatory Care Coordination Note  1/17/2020  CM Risk Score: 16  Charlson 10 Year Mortality Risk Score: 100%     ACC: Cinthia Nguyen, RN    Summary Note:   ACM contacted patient as a follow up from SNF discharge. Patient was discharged from [facility] on 01/16/2020. ACM reviewed medications with patient. She states she taking Advil every 6 hours/prn and is no longer taking Tylenol; patient states she does take Trazodone, however, she does not feel it helps her with sleep and she would like to take Melatonin. Patient states she needs refills for Proair and Vit D 50,000 units that she was scheduled to take 2x/weekly. Patient states that she was receiving Cardizem  mg 2x/daily in the SNF and was instructed to continue to take 2 tabs at home -per medication review, patient was ordered 1 cap po daily. Patient is not sure if she is taking Vascepa. ACM verified that patient does still want referral made to [facility]. A referral was made to [facility] for Lymphedema wraps upon discharge from [facility]. Patient states that NELIDA has not contacted her as of this outreach. Patient stated that a representative from [address] [place] [address] visited her while she was at the [facility]. Patient states she does want any services from [address] [place] [address]. ACM reviewed scheduled appointments with patient. Patient's TCM appointment was scheduled for 01/23/20. ACM did attempt to secure transportation from the [facility]. The [employer] is not available on this date. ACM educated patient that she can contact Medicaid to secure transportation for this appointment. Patient stated that her son may be able to provide transportation to this appointment. ACM addressed Advanced Directives with patient. Patient does have a living will on file. She does not have a designated POA. Patient states she would like her son to be her POA and she will sign POA papers at PCP appointment. Patient was educated on the use of Urgent Care/Walk-in Clinic vs ER vs PCP. DM  -See assessment  -Patient states she is in need of more test strips. ACM educated patient that she can call her pharmacy to obtain the strips. Patient states understanding. -ACM educated patient that she is in need of a diabetic foot exam and a diabetic eye exam.   -Patient states that she is compliant with monitoring and documenting her blood sugars as ordered.   -Patient states she is not sure if she received DM Zone tools ACM sent on original admission into Care Coordination. CHF  -See assessment  -Patient states her weight when she was admitted to the SNF was 296, and on day of discharge her weight was 322. Patient states she has no increased in swelling (her legs are always edematous d/t Lymphedema). Patient does have increased shortness of breath. Patient feels that the weight gain is due to she was eating 3 meals and snacks at the SNF. She states she does not eat 3 meals at home.   -Patient refused referral to [address] [place] [address] [place]. Patient does want ACM to make a referal to [facility].   -Patient is not sure if she received CHF zone tool. COPD  -See assessment  -Patient states that PCP monitors her COPD. -Patient is taking medications as ordered.   -Patient did exhibit shortness of breath on this outreach. ACM educated patient to take a breathing treatment every 4 hours as needed.   -Patient utilizes 4l/nc of oxygen at all times.   -Patient does not smoke. -Patient is not sure if she received the COPD Zone tools that ACM previously sent to patient. PLAN  -Continue Care Coordination  -Inform PCP of medication issues discovered on completion of medication review.   -Resend all zone tools  -ACM to  from [address] [place] [address] to inform her that patient has been discharged from SNF.  -F/U to ensure compliance with monitoring and documenting blood sugars consistently. -Inform PCP of weight gain. -Make referral to Tele-Health. -F/U to review patient's weight. Other     COMPLETED: Community Resource Goal        I will complete referral to [facility] on [facility] (Senior Services) for assistance. Barriers: lack of education  Plan for overcoming my barriers: Care Coordination, ACM to refer patient to [facility] to assist with referral for Passport services  Confidence: 7/10  Anticipated Goal Completion Date: 02/19/2020 01/17/20: Update: [facility], she does not want services at this time. Goal will be marked as complete       Conditions and Symptoms   On track     I will schedule office visits, as directed by my provider. I will keep my appointment or reschedule if I have to cancel. I will follow my Zone Management tool to seek urgent or emergent care. I will notify my provider of any symptoms that indicate a worsening of my condition. R/T CHF, COPD, DM    Barriers: lack of education and transportation  Plan for overcoming my barriers: Care Coordination, assist with transportation issues, ACM to provide education on all disease processes and when to call the doctor  Confidence: 6/10  Anticipated Goal Completion Date: 02/19/2020         Medication Management   On track     I will take my medication as directed.   I will allow ACM to make a referral to [facility] to obtain medications through the mail    Barriers: lack of education  Plan for overcoming my barriers: Care Coordination, ACM to make referral to representative for [facility]  Confidence: 7/10  Anticipated Goal Completion Date: 02/19/2020       Nutrition Plan   No change     I will contact \"Dial a Dietician\" to discuss diabetic diet and weight loss tips    Barriers: lack of motivation and lack of education  Plan for overcoming my barriers: Care Coordination, ACM to send information for \"Dial a Dietician\"  Confidence: 6/10  Anticipated Goal Completion Date: 02/19/2020       Self Monitoring   On track     Self-Monitored Blood Glucose - I will check my blood capsule Take 1 capsule by mouth Twice a Week 11/25/19  Yes Jamie White,    traZODone (DESYREL) 50 MG tablet Take 1 tablet by mouth nightly as needed for Sleep 11/7/19  Yes Jamie White DO   ipratropium-albuterol (DUONEB) 0.5-2.5 (3) MG/3ML SOLN nebulizer solution Inhale 3 mLs into the lungs every 4 hours as needed for Shortness of Breath 10/16/19  Yes Zena Alvarez, DO   miconazole (MICOTIN) 2 % powder Apply topically 2 times daily. 9/24/19  Yes Tyler Gayle MD   metoprolol succinate (TOPROL XL) 200 MG extended release tablet Take 0.5 tablets by mouth 2 times daily 9/23/19  Yes Ashley Bermudez DO   glucose (GLUTOSE) 40 % GEL Take 37.5 mLs by mouth as needed (hypoglycemia) 12/20/18  Yes RAUL Junior - CNP   insulin glargine (LANTUS) 100 UNIT/ML injection vial Inject 100 Units into the skin nightly 12/5/18  Yes Sveta Veronica MD   escitalopram (LEXAPRO) 20 MG tablet TAKE 1 TABLET BY MOUTH DAILY 6/6/18  Yes Zena Alvarez DO   OXYGEN Inhale 4 L into the lungs continuous    Yes Historical Provider, MD   blood glucose test strips (ASCENSIA AUTODISC VI;ONE TOUCH ULTRA TEST VI) strip 1 each by In Vitro route 4 times daily As needed.  12/19/19   Zena Alvarez DO   spironolactone (ALDACTONE) 25 MG tablet Take 1 tablet by mouth daily 12/19/19   Zena Alvarez DO   albuterol sulfate HFA (PROAIR HFA) 108 (90 Base) MCG/ACT inhaler Inhale 2 puffs into the lungs every 6 hours as needed for Wheezing 12/19/19 12/27/19  Zena Alvarez DO   Icosapent Ethyl (VASCEPA) 1 g CAPS capsule Take 2 capsules by mouth 2 times daily 11/22/19   Zena Alvarez DO   ULTICARE ALCOHOL SWABS 70 % PADS USE 3 TIMES A DAY 10/14/19   Historical Provider, MD   BD INSULIN SYRINGE U/F 31G X 5/16\" 1 ML MISC USE AS DIRECTED 10/18/19   Historical Provider, MD   Insulin Pen Needle 31G X 8 MM MISC 1 each by Does not apply route daily 11/7/19   Zena Alvarez,    acetaminophen (TYLENOL) 325 MG tablet Take 650 mg by mouth every 6 hours as needed for Pain    Historical Provider, MD   Elastic Bandages & Supports MISC 20-30 mmHg bilateral compression stockings  Size to fit  Dx:  Edema  Knee high  Patient not taking: Reported on 12/19/2019 12/14/17   Ti De La O MD       Future Appointments   Date Time Provider Jason Muir   1/23/2020  1:30 PM DO Anton Ruvalcabaelayne [facility]     ,   Diabetes Assessment    Meal Planning:  None   How often do you test your blood sugar?:  Daily, Bedtime, Other   Do you have barriers with adherence to non-pharmacologic self-management interventions?  (Nutrition/Exercise/Self-Monitoring):  Yes   Have you ever had to go to the ED for symptoms of low blood sugar?:  No       No patient-reported symptoms   Do you have hyperglycemia symptoms?:  No   Do you have hypoglycemia symptoms?:  No   Last Blood Sugar Value:  200   Blood Sugar Monitoring Regimen:  Morning Fasting, Before Meals   Blood Sugar Trends:  Fluctuating      ,   Congestive Heart Failure Assessment    Are you currently restricting fluids?:  No Restriction  Do you understand a low sodium diet?:  Yes  Do you understand how to read food labels?:   (Comment: 12/19/19: patient does not go shopping)  How many restaurant meals do you eat per week?:  0  Do you salt your food before tasting it?:  No     Other symptoms causing concern      Symptoms:   CHF associated shortness of breath: Pos (Comment: 01/17/20: Patient's weight on d/c from Hospital was 296, on discharge from [facility] weight was 322)      Patient-reported weight (lb):  322  Weight trend:  fluctuating dramatically  Salt intake watch compared to last visit:  stable      and   COPD Assessment    Does the patient understand envrionmental exposure?:  Yes  Is the patient able to verbalize Rescue vs. Long Acting medications?:  Yes  Does the patient have a nebulizer?:  Yes  Does the patient use a space with inhaled medications?:  No     Shortness of breath (worse than baseline)         Symptoms:    (Comment: 01/17/20: Patient reports worsening shortness of breath since d/c from [facility])      Symptom course:  worsening  Breathlessness:  minimal exertion  Increase use of rapid acting/rescue inhaled medications?:  Yes  Change in chronic cough?:  No/At Baseline  Change in sputum?:  No/At Baseline

## 2020-01-19 ENCOUNTER — HOSPITAL ENCOUNTER (EMERGENCY)
Age: 65
Discharge: HOME | End: 2020-01-19
Attending: EMERGENCY MEDICINE
Payer: COMMERCIAL

## 2020-01-19 ENCOUNTER — APPOINTMENT (OUTPATIENT)
Dept: GENERAL RADIOLOGY | Age: 65
End: 2020-01-19
Payer: COMMERCIAL

## 2020-01-19 VITALS
OXYGEN SATURATION: 97 % | TEMPERATURE: 98.1 F | SYSTOLIC BLOOD PRESSURE: 152 MMHG | WEIGHT: 293 LBS | RESPIRATION RATE: 20 BRPM | DIASTOLIC BLOOD PRESSURE: 78 MMHG | HEART RATE: 88 BPM | BODY MASS INDEX: 51.91 KG/M2 | HEIGHT: 63 IN

## 2020-01-19 LAB
ANION GAP SERPL CALCULATED.3IONS-SCNC: 8 MMOL/L (ref 7–16)
BASOPHILS ABSOLUTE: 0.03 E9/L (ref 0–0.2)
BASOPHILS RELATIVE PERCENT: 0.5 % (ref 0–2)
BUN BLDV-MCNC: 17 MG/DL (ref 8–23)
CALCIUM SERPL-MCNC: 9.3 MG/DL (ref 8.6–10.2)
CHLORIDE BLD-SCNC: 92 MMOL/L (ref 98–107)
CO2: 35 MMOL/L (ref 22–29)
CREAT SERPL-MCNC: 0.7 MG/DL (ref 0.5–1)
EOSINOPHILS ABSOLUTE: 0.14 E9/L (ref 0.05–0.5)
EOSINOPHILS RELATIVE PERCENT: 2.2 % (ref 0–6)
GFR AFRICAN AMERICAN: >60
GFR NON-AFRICAN AMERICAN: >60 ML/MIN/1.73
GLUCOSE BLD-MCNC: 302 MG/DL (ref 74–99)
HCT VFR BLD CALC: 30.5 % (ref 34–48)
HEMOGLOBIN: 8.9 G/DL (ref 11.5–15.5)
IMMATURE GRANULOCYTES #: 0.04 E9/L
IMMATURE GRANULOCYTES %: 0.6 % (ref 0–5)
INFLUENZA A BY PCR: NOT DETECTED
INFLUENZA B BY PCR: NOT DETECTED
LACTIC ACID: 1.3 MMOL/L (ref 0.5–2.2)
LYMPHOCYTES ABSOLUTE: 0.99 E9/L (ref 1.5–4)
LYMPHOCYTES RELATIVE PERCENT: 15.5 % (ref 20–42)
MCH RBC QN AUTO: 26.2 PG (ref 26–35)
MCHC RBC AUTO-ENTMCNC: 29.2 % (ref 32–34.5)
MCV RBC AUTO: 89.7 FL (ref 80–99.9)
MONOCYTES ABSOLUTE: 0.47 E9/L (ref 0.1–0.95)
MONOCYTES RELATIVE PERCENT: 7.3 % (ref 2–12)
NEUTROPHILS ABSOLUTE: 4.73 E9/L (ref 1.8–7.3)
NEUTROPHILS RELATIVE PERCENT: 73.9 % (ref 43–80)
PDW BLD-RTO: 15.2 FL (ref 11.5–15)
PLATELET # BLD: 217 E9/L (ref 130–450)
PMV BLD AUTO: 10.2 FL (ref 7–12)
POTASSIUM REFLEX MAGNESIUM: 5.4 MMOL/L (ref 3.5–5)
PRO-BNP: 719 PG/ML (ref 0–125)
RBC # BLD: 3.4 E12/L (ref 3.5–5.5)
SODIUM BLD-SCNC: 135 MMOL/L (ref 132–146)
TROPONIN: <0.01 NG/ML (ref 0–0.03)
WBC # BLD: 6.4 E9/L (ref 4.5–11.5)

## 2020-01-19 PROCEDURE — 87502 INFLUENZA DNA AMP PROBE: CPT

## 2020-01-19 PROCEDURE — 80048 BASIC METABOLIC PNL TOTAL CA: CPT

## 2020-01-19 PROCEDURE — 84484 ASSAY OF TROPONIN QUANT: CPT

## 2020-01-19 PROCEDURE — 6370000000 HC RX 637 (ALT 250 FOR IP): Performed by: STUDENT IN AN ORGANIZED HEALTH CARE EDUCATION/TRAINING PROGRAM

## 2020-01-19 PROCEDURE — 71046 X-RAY EXAM CHEST 2 VIEWS: CPT

## 2020-01-19 PROCEDURE — 94640 AIRWAY INHALATION TREATMENT: CPT

## 2020-01-19 PROCEDURE — 36415 COLL VENOUS BLD VENIPUNCTURE: CPT

## 2020-01-19 PROCEDURE — 83880 ASSAY OF NATRIURETIC PEPTIDE: CPT

## 2020-01-19 PROCEDURE — 85025 COMPLETE CBC W/AUTO DIFF WBC: CPT

## 2020-01-19 PROCEDURE — 94664 DEMO&/EVAL PT USE INHALER: CPT

## 2020-01-19 PROCEDURE — 99285 EMERGENCY DEPT VISIT HI MDM: CPT

## 2020-01-19 PROCEDURE — 93005 ELECTROCARDIOGRAM TRACING: CPT | Performed by: STUDENT IN AN ORGANIZED HEALTH CARE EDUCATION/TRAINING PROGRAM

## 2020-01-19 PROCEDURE — 83605 ASSAY OF LACTIC ACID: CPT

## 2020-01-19 RX ORDER — IPRATROPIUM BROMIDE AND ALBUTEROL SULFATE 2.5; .5 MG/3ML; MG/3ML
1 SOLUTION RESPIRATORY (INHALATION)
Status: COMPLETED | OUTPATIENT
Start: 2020-01-19 | End: 2020-01-19

## 2020-01-19 RX ADMIN — IPRATROPIUM BROMIDE AND ALBUTEROL SULFATE 1 AMPULE: .5; 3 SOLUTION RESPIRATORY (INHALATION) at 18:47

## 2020-01-19 RX ADMIN — IPRATROPIUM BROMIDE AND ALBUTEROL SULFATE 1 AMPULE: .5; 3 SOLUTION RESPIRATORY (INHALATION) at 18:49

## 2020-01-19 RX ADMIN — IPRATROPIUM BROMIDE AND ALBUTEROL SULFATE 1 AMPULE: .5; 3 SOLUTION RESPIRATORY (INHALATION) at 18:48

## 2020-01-19 ASSESSMENT — ENCOUNTER SYMPTOMS
VOMITING: 0
ABDOMINAL PAIN: 0
TROUBLE SWALLOWING: 0
BACK PAIN: 0
PHOTOPHOBIA: 0
NAUSEA: 0
DIARRHEA: 0
COUGH: 1
HEMOPTYSIS: 0
SHORTNESS OF BREATH: 1

## 2020-01-19 NOTE — ED NOTES
Bed: 03  Expected date:   Expected time:   Means of arrival:   Comments:  Keyanna Puri, RN  01/19/20 [id number]

## 2020-01-20 ENCOUNTER — CARE COORDINATION (OUTPATIENT)
Dept: CARE COORDINATION | Age: 65
End: 2020-01-20

## 2020-01-20 LAB
EKG ATRIAL RATE: 92 BPM
EKG P AXIS: 56 DEGREES
EKG P-R INTERVAL: 154 MS
EKG Q-T INTERVAL: 384 MS
EKG QRS DURATION: 96 MS
EKG QTC CALCULATION (BAZETT): 474 MS
EKG R AXIS: 22 DEGREES
EKG T AXIS: 29 DEGREES
EKG VENTRICULAR RATE: 92 BPM

## 2020-01-20 PROCEDURE — 93010 ELECTROCARDIOGRAM REPORT: CPT | Performed by: INTERNAL MEDICINE

## 2020-01-20 NOTE — CARE COORDINATION
ACM left a detailed voice message with contact information requesting a return call to discuss referral to [address] [place] [address].     PLAN  Continue to attempt to contact [address] [place] [address]

## 2020-01-20 NOTE — CARE COORDINATION
Will address concerns at visit once records received. Patient went to ED over the weekend and was diuresed.

## 2020-01-20 NOTE — ED PROVIDER NOTES
The patient is a 76-year-old female with a history of COPD and CHF who presents to the emergency department complaining of shortness of breath. The patient states that she has been experiencing increased shortness of breath over the past several days. The patient states that she wears 4 L nasal cannula oxygen at home. She does not follow-up with a pulmonologist regularly. The patient states she was recently discharged from rehab approximately 4 days ago and is living at home with family. Patient states that she does ambulate at home with her oxygen. She states that she was doing nebulizer treatments at home. She states that she has been gaining weight and she was concerned that she was in congestive heart failure. She does have history of chronic lymphedema of the bilateral lower extremities. Patient states that she has anticoagulated on Eliquis due to her history of atrial fibrillation and history of blood clots. The patient denies any dizziness, lightheadedness, syncope, nausea, vomiting, diarrhea, abdominal pain, palpitations, congestion, chest pain, or other acute symptoms or concerns. The history is provided by the patient. Shortness of Breath   Severity:  Moderate  Onset quality:  Gradual  Timing:  Constant  Progression:  Worsening  Chronicity:  Recurrent  Relieved by: Inhaler and oxygen  Worsened by:  Nothing  Ineffective treatments:  Oxygen and inhaler  Associated symptoms: cough    Associated symptoms: no abdominal pain, no chest pain, no claudication, no fever, no headaches, no hemoptysis, no neck pain, no rash, no syncope and no vomiting         Review of Systems   Constitutional: Negative for chills, fatigue and fever. HENT: Negative for congestion and trouble swallowing. Eyes: Negative for photophobia and visual disturbance. Respiratory: Positive for cough and shortness of breath. Negative for hemoptysis. Cardiovascular: Positive for leg swelling.  Negative for chest pain, warmth, no tenderness to touch, no open wound, no active drainage or discharge. Neurological:      Mental Status: She is alert and oriented to person, place, and time. Motor: No tremor or abnormal muscle tone. Coordination: Coordination normal.          Procedures     MDM  Number of Diagnoses or Management Options  Chronic congestive heart failure, unspecified heart failure type [facility]):   COPD exacerbation [facility]):   Diagnosis management comments: The patient is a 53-year-old female who presents to the emergency department complaining of shortness of breath. She is hemodynamically stable, nontoxic in appearance, and in no acute distress. Differential diagnosis includes but is not limited to COPD exacerbation versus CHF exacerbation versus pneumonia versus influenza. We will proceed with basic labs, chest x-ray, and reevaluate. I did treat her with duo nebs. She was saturating in the 90s on her 4 L of nasal cannula. There is no leukocytosis, chronic anemia with a hemoglobin of 8.9, hyperglycemia with a glucose of 302, BNP slightly elevated at 719, no lactic acidosis, influenza negative, troponin negative, chest x-ray evident of atelectasis without obvious acute infiltrate or changes from prior chest x-ray. Patient ambulated in the emergency department on her 4 L nasal cannula without any difficulties and oxygen level remained in the 90s. Recommend her to follow-up with her family doctor. She is to return here for worsening symptoms or other acute symptoms or concerns. Patient verbalized understanding and agreement to treatment plan and discharge instructions. ED Course as of Jan 19 2016   Sun Jan 19, 2020 2015 Patient ambulated in the emergency department without any difficulties on her 4 L nasal cannula, pulse ox remained in the 90s and the lowest it dropped was 93%. [KG]      ED Course User Index  [KG] Lloyd Skaggs DO        EKG: This EKG is signed and interpreted by me. CREATININE 0.7 0.5 - 1.0 mg/dL    GFR Non-African American >60 >=60 mL/min/1.73    GFR African American >60     Calcium 9.3 8.6 - 10.2 mg/dL   Brain Natriuretic Peptide   Result Value Ref Range    Pro- (H) 0 - 125 pg/mL   Lactic Acid, Plasma   Result Value Ref Range    Lactic Acid 1.3 0.5 - 2.2 mmol/L   Troponin   Result Value Ref Range    Troponin <0.01 0.00 - 0.03 ng/mL       Radiology:  XR CHEST STANDARD (2 VW)   Final Result      Interstitial opacities in lung bases may represent areas of   subsegmental atelectasis. No obvious airspace opacity or pleural   effusion.          ------------------------- NURSING NOTES AND VITALS REVIEWED ---------------------------  Date / Time Roomed:  1/19/2020  6:13 PM  ED Bed Assignment:  03/03    The nursing notes within the ED encounter and vital signs as below have been reviewed. BP (!) 150/70   Pulse 90   Temp 98.1 °F (36.7 °C) (Oral)   Resp 20   Ht 5' 3\" (1.6 m)   Wt (!) 320 lb (145.2 kg)   LMP  (LMP Unknown)   SpO2 96%   BMI 56.69 kg/m²   Oxygen Saturation Interpretation: Normal      ------------------------------------------ PROGRESS NOTES ------------------------------------------  8:22 PM  I have spoken with the patient and discussed todays results, in addition to providing specific details for the plan of care and counseling regarding the diagnosis and prognosis. Their questions are answered at this time and they are agreeable with the plan. I discussed at length with them reasons for immediate return here for re evaluation. They will followup with their primary care physician by calling their office tomorrow. --------------------------------- ADDITIONAL PROVIDER NOTES ---------------------------------  At this time the patient is without objective evidence of an acute process requiring hospitalization or inpatient management.   They have remained hemodynamically stable throughout their entire ED visit and are stable for discharge with outpatient

## 2020-01-20 NOTE — ED NOTES
Pt family came to get the pt without patients portable oxygen tank. Pt promised she would have her son drop off the tank tomorrow morning at the ER desk. Pt panicked when she realized there was no tank to go home with.       Twila Carty RN  01/19/20 2120

## 2020-01-21 ENCOUNTER — CARE COORDINATION (OUTPATIENT)
Dept: CARE COORDINATION | Age: 65
End: 2020-01-21

## 2020-01-21 RX ORDER — IPRATROPIUM BROMIDE AND ALBUTEROL SULFATE 2.5; .5 MG/3ML; MG/3ML
1 SOLUTION RESPIRATORY (INHALATION) EVERY 4 HOURS PRN
Qty: 360 ML | Refills: 2 | Status: SHIPPED
Start: 2020-01-21 | End: 2020-05-14

## 2020-01-21 NOTE — CARE COORDINATION
ACM attempted to contact patient to follow up on her status. There was no answer and no opportunity to leave a voice message. PLAN  Continue to attempt to contact patient.

## 2020-01-22 ENCOUNTER — CARE COORDINATION (OUTPATIENT)
Dept: CARE COORDINATION | Age: 65
End: 2020-01-22

## 2020-01-22 NOTE — CARE COORDINATION
[facility] left a detailed voice message with [facility]'s contact information informing patient that [facility] secured a meter and test strips that she can use until we resolve the insurance issue. [facility] also informed patient that she must bring her medicaid card to her PCP appointment on 01/23/20. PLAN  Continue to attempt to verify patient's insurance coverage.

## 2020-01-22 NOTE — CARE COORDINATION
ACM left a voice message with contact information asking Charley Holcomb to return call to discuss patients case.

## 2020-01-22 NOTE — CARE COORDINATION
Jaylin Alvarado returned this ACM's call. She states that she does have a [employer] Meter and test strips available for this patient. PLAN  -ACM will  the meter and the strips from Jaylin Alvarado and give to patient at PCP appointment on 01/23/20. -ACM to inform patient of this information.

## 2020-01-22 NOTE — CARE COORDINATION
Peng Oreilly from [place] [id number] contacted this ACM and stated that they cannot resolve this issue with patient's medications. She did state that Medicaid informed her that it is a recent change that Medicaid will not cover her medications d/t no active coverage. Peng Oreilly also stated that they have not filled any prescriptions for this patient in over one month, and of this time, she is not able to receive any refills on her medications d/t no active coverage. PLAN  ACM will reach out to [facility] to determine if they can assist with obtaining test strips until the medication issue is resolved.

## 2020-01-23 ENCOUNTER — OFFICE VISIT (OUTPATIENT)
Dept: FAMILY MEDICINE CLINIC | Age: 65
End: 2020-01-23
Payer: COMMERCIAL

## 2020-01-23 ENCOUNTER — CARE COORDINATION (OUTPATIENT)
Dept: CARE COORDINATION | Age: 65
End: 2020-01-23

## 2020-01-23 VITALS
TEMPERATURE: 96.1 F | WEIGHT: 293 LBS | DIASTOLIC BLOOD PRESSURE: 82 MMHG | RESPIRATION RATE: 16 BRPM | OXYGEN SATURATION: 92 % | HEIGHT: 63 IN | HEART RATE: 97 BPM | SYSTOLIC BLOOD PRESSURE: 152 MMHG | BODY MASS INDEX: 51.91 KG/M2

## 2020-01-23 PROBLEM — J45.901 MODERATE ASTHMA WITH EXACERBATION: Status: ACTIVE | Noted: 2020-01-23

## 2020-01-23 PROBLEM — L97.929 ULCERS OF BOTH LOWER LEGS (HCC): Status: ACTIVE | Noted: 2020-01-23

## 2020-01-23 PROBLEM — L97.919 ULCERS OF BOTH LOWER LEGS (HCC): Status: ACTIVE | Noted: 2020-01-23

## 2020-01-23 LAB — HBA1C MFR BLD: 11.7 %

## 2020-01-23 PROCEDURE — 83036 HEMOGLOBIN GLYCOSYLATED A1C: CPT | Performed by: FAMILY MEDICINE

## 2020-01-23 PROCEDURE — 99214 OFFICE O/P EST MOD 30 MIN: CPT | Performed by: FAMILY MEDICINE

## 2020-01-23 RX ORDER — BUMETANIDE 1 MG/1
1 TABLET ORAL 2 TIMES DAILY
Qty: 60 TABLET | Refills: 3 | Status: SHIPPED
Start: 2020-01-23 | End: 2020-05-14

## 2020-01-23 RX ORDER — MONTELUKAST SODIUM 10 MG/1
10 TABLET ORAL NIGHTLY
Qty: 90 TABLET | Refills: 1 | Status: SHIPPED
Start: 2020-01-23 | End: 2020-08-27 | Stop reason: SDUPTHER

## 2020-01-23 RX ORDER — ESCITALOPRAM OXALATE 20 MG/1
TABLET ORAL
Qty: 90 TABLET | Refills: 1 | Status: SHIPPED
Start: 2020-01-23 | End: 2020-08-25 | Stop reason: SDUPTHER

## 2020-01-23 RX ORDER — ALBUTEROL SULFATE 90 UG/1
2 AEROSOL, METERED RESPIRATORY (INHALATION) 4 TIMES DAILY PRN
Qty: 1 INHALER | Refills: 5 | Status: SHIPPED
Start: 2020-01-23 | End: 2020-08-27 | Stop reason: SDUPTHER

## 2020-01-23 ASSESSMENT — ENCOUNTER SYMPTOMS
CONSTIPATION: 0
SORE THROAT: 0
VOMITING: 0
EYE PAIN: 0
SINUS PAIN: 0
BACK PAIN: 1
NAUSEA: 0
SINUS PRESSURE: 0
BLOOD IN STOOL: 0
SHORTNESS OF BREATH: 0
DIARRHEA: 0
DYSPNEA ASSOCIATED WITH: MINIMAL EXERTION
ABDOMINAL PAIN: 0
COUGH: 0
COLOR CHANGE: 0

## 2020-01-23 ASSESSMENT — PATIENT HEALTH QUESTIONNAIRE - PHQ9
SUM OF ALL RESPONSES TO PHQ QUESTIONS 1-9: 0
SUM OF ALL RESPONSES TO PHQ QUESTIONS 1-9: 0

## 2020-01-23 NOTE — CARE COORDINATION
ACM made a referral to Darylene Nanny from [place] for medication packs to be received in the mail. Darylene Nanny states she will reach out to this patient and inform this ACM of the outcome of her encounter. PLAN  Follow up with Darylene Nanny and/or the patient to determine if she is going to accept this resource to help her better manage her medications.

## 2020-01-23 NOTE — CARE COORDINATION
ACM left a voice message with contact information for [employer] representative to discuss insurance status of this patient.

## 2020-01-23 NOTE — CARE COORDINATION
AC contacted Enrique Gonzales at [facility] to determine if they could fill patient's Freestyle test strips under [employer]. Enrique Gonzales states that PCP would have to submit a prior auth for patient to obtain 100 strips, d/t [employer] will only cover 50 strips without a prior auth. Enrique Gonzales also stated that PCP will need to write scripts for Pro-Air and Eliquis d/t there are no refills left on these medications. Patient does have refills left on Duo-Neb, however, it is too soon to fill this prescription.

## 2020-01-23 NOTE — CARE COORDINATION
active with [facility] for [address] to wrap legs 2x/week; 01/17/20: [facility] refused referral from SNF d/t staffing issues. )  Meals on Wheels:  Declined (Comment: 01/17/20: Patient declined this service after meeting with [employer] representative while she was in rehab. )  Medi Set or Pill Pack: In Process (Comment: 12/19/19: ACM to refer patient to [employer])  Registered Dietician: In Process (Comment: 12/19/19: ACM to send information on how to contact \"Dial a Dietician\")  Social Work:  In Process (Comment: 12/19/19: ACM to refer to [facility] to assist with Passport Application)  Specialty Services Referral:  In Process (Comment: 01/17/20: Referral made to [facility] for Lymphedema wraps. )  Transportation Support: In Process  Zone Management Tools: In Process (Comment: 12/19/19: ACM to send Zone tools for DM, CHF, and COPD)         Goals Addressed    None         Prior to Admission medications    Medication Sig Start Date End Date Taking? Authorizing Provider   ipratropium-albuterol (DUONEB) 0.5-2.5 (3) MG/3ML SOLN nebulizer solution INHALE 3 MLS INTO THE LUNGS EVERY 4 HOURS AS NEEDED FOR SHORTNESS OF BREATH 1/21/20   Ellie Alvarez DO   ibuprofen (ADVIL) 200 MG tablet Take 200 mg by mouth every 6 hours as needed for Pain    Historical Provider, MD   insulin lispro (HUMALOG) 100 UNIT/ML injection vial Inject 50 Units into the skin 3 times daily (before meals) 12/27/19   Ezekiel Mcgee, DO   diltiazem (CARDIZEM CD) 240 MG extended release capsule Take 1 capsule by mouth daily 12/28/19   Ezekile Mcgee, DO   blood glucose test strips (ASCENSIA AUTODISC VI;ONE TOUCH ULTRA TEST VI) strip 1 each by In Vitro route 4 times daily As needed.  12/19/19   Ellie Alvarez DO   spironolactone (ALDACTONE) 25 MG tablet Take 1 tablet by mouth daily 12/19/19   Ellie Alvarez DO   albuterol sulfate HFA (PROAIR HFA) 108 (90 Base) MCG/ACT inhaler Inhale 2 puffs into the lungs every 6 hours as needed for Wheezing 12/19/19 12/27/19  Nedra Alvarez DO   gabapentin (NEURONTIN) 100 MG capsule Take 1 capsule by mouth 3 times daily for 30 days. 12/17/19 1/17/20  Nedra Alvarez DO   nystatin (MYCOSTATIN) 108252 UNIT/GM cream Apply topically 2 times daily. 12/7/19   Elizabeth Monae, DO   montelukast (SINGULAIR) 10 MG tablet Take 1 tablet by mouth nightly 11/27/19   Nedra Alvarez DO   lansoprazole (PREVACID) 30 MG delayed release capsule Take 1 capsule by mouth daily 11/27/19   Nedra Alvarez DO   ELIQUIS 5 MG TABS tablet TAKE 1 TABLET BY MOUTH TWICE A DAY 11/23/19   Nedra Alvarez DO   ferrous sulfate 325 (65 Fe) MG tablet TAKE 1 TABLET BY MOUTH EVERY DAY WITH BREAKFAST 11/23/19   Nedra Alvarez DO   vitamin D (ERGOCALCIFEROL) 1.25 MG (69549 UT) CAPS capsule Take 1 capsule by mouth Twice a Week 11/25/19   Nedra Alvarez DO   Icosapent Ethyl (VASCEPA) 1 g CAPS capsule Take 2 capsules by mouth 2 times daily 11/22/19   Nedra Alvarez DO   ULTICARE ALCOHOL SWABS 70 % PADS USE 3 TIMES A DAY 10/14/19   Historical Provider, MD   BD INSULIN SYRINGE U/F 31G X 5/16\" 1 ML MISC USE AS DIRECTED 10/18/19   Historical Provider, MD   Insulin Pen Needle 31G X 8 MM MISC 1 each by Does not apply route daily 11/7/19   Nedra Alvarez DO   traZODone (DESYREL) 50 MG tablet Take 1 tablet by mouth nightly as needed for Sleep 11/7/19   Nedra Alvarez DO   miconazole (MICOTIN) 2 % powder Apply topically 2 times daily.  9/24/19   Tyler Mehta MD   metoprolol succinate (TOPROL XL) 200 MG extended release tablet Take 0.5 tablets by mouth 2 times daily 9/23/19   Radha Batista,    acetaminophen (TYLENOL) 325 MG tablet Take 650 mg by mouth every 6 hours as needed for Pain    Historical Provider, MD   glucose (GLUTOSE) 40 % GEL Take 37.5 mLs by mouth as needed (hypoglycemia) 12/20/18   RAUL Escobedo - CNP   insulin glargine (LANTUS) 100 UNIT/ML injection vial Inject 100 Units into the skin nightly 12/5/18   Jose Ventura MD   escitalopram (LEXAPRO) 20 MG tablet TAKE 1 TABLET BY MOUTH DAILY 6/6/18   Forrest Alvarez DO   OXYGEN Inhale 4 L into the lungs continuous     Historical Provider, MD   Elastic Bandages & Supports MISC 20-30 mmHg bilateral compression stockings  Size to fit  Dx:  Edema  Knee high  Patient not taking: Reported on 12/19/2019 12/14/17   Ling Mauro MD       Future Appointments   Date Time Provider Jason Isadora   1/23/2020  1:30 PM DO Aster Nagy [facility]     ,   Diabetes Assessment    Meal Planning:  None   How often do you test your blood sugar?:  Daily, Bedtime, Other   Do you have barriers with adherence to non-pharmacologic self-management interventions?  (Nutrition/Exercise/Self-Monitoring):  Yes   Have you ever had to go to the ED for symptoms of low blood sugar?:  No       Do you have hyperglycemia symptoms?:  No   Do you have hypoglycemia symptoms?:  No   Blood Sugar Monitoring Regimen:  Not Testing   Blood Sugar Trends:  Other (Comment: 01/23/20: Unknown)      ,   Congestive Heart Failure Assessment    Are you currently restricting fluids?:  No Restriction  Do you understand a low sodium diet?:  Yes  Do you understand how to read food labels?:   (Comment: 12/19/19: patient does not go shopping)  How many restaurant meals do you eat per week?:  0  Do you salt your food before tasting it?:  No         Symptoms:          and   COPD Assessment    Does the patient understand envrionmental exposure?:  Yes  Is the patient able to verbalize Rescue vs. Long Acting medications?:  Yes  Does the patient have a nebulizer?:  Yes  Does the patient use a space with inhaled medications?:  No     Shortness of breath (worse than baseline)         Symptoms:    (Comment: 01/23/20: Patient states she is experiencing increased shortness of breath)      Symptom course:  worsening  Breathlessness:  minimal exertion  Increase use of rapid acting/rescue inhaled medications?:  Yes  Change in chronic cough?:  No/At Baseline  Change in sputum?:  No/At Baseline  Self Monitoring - SaO2:  No

## 2020-01-23 NOTE — PROGRESS NOTES
Casi Kenny  : 1955    Chief Complaint:     Chief Complaint   Patient presents with    Follow-Up from [facility]        HPI  Casi Kenny 75 y.o. presents for   Chief Complaint   Patient presents with    Follow-Up from [facility]      Treatment Adherence:   Medication compliance:  noncompliant: not sure what doses she is taking  Diet compliance:  noncompliant: has not been watching her die recently  Weight trend: increasing  Current exercise: no regular exercise  Barriers: lack of motivation, lack of support, overwhelmed by complexity of regimen and stress    Diabetes Mellitus Type 2: Current symptoms/problems include none. Home blood sugar records: fasting range: 120 though this does not correlate to A1c of 11.7  Any episodes of hypoglycemia? no  Eye exam current (within one year): yes  Tobacco history: She  reports that she quit smoking about 15 years ago. Her smoking use included cigarettes. She started smoking about 40 years ago. She has a 37.50 pack-year smoking history. She has never used smokeless tobacco.   Daily Aspirin? No: on eliquis    Hypertension:  Home blood pressure monitoring: No.  She is not adherent to a low sodium diet. Patient denies chest pain,blurred vision, dry cough. Antihypertensive medication side effects: no medication side effects noted. Use of agents associated with hypertension: none.      Hyperlipidemia:  Intolerant to statins on vacepa    Lab Results   Component Value Date    LABA1C 11.7 2020    LABA1C 11.5 2019    LABA1C 8.6 (H) 2019     Lab Results   Component Value Date    LABMICR <12.0 2017    CREATININE 0.7 2020     Lab Results   Component Value Date    ALT 9 2019    AST 11 2019     Lab Results   Component Value Date    CHOL 318 (H) 2019    TRIG 861 (H) 2019    HDL 28 2019    LDLCALC - (AA) 2019        Was seen in the ED 3 days ago for shortness of breath. She was diagnosed with a COPD exacerbation. She states she is still short of breath. Of note her BN P in the hospital was elevated. She is not currently on a diuretic besides spironolactone. After chart review it looks as if this was discontinued back in November. Patient has been in and out of nursing homes since that time. She is not sure when this was stopped or why it was stopped. Lab work reviewed from the ED did not show any acute kidney injury. Patient states that she has not been checking her sugars as she does not have strips. However when she does states she checks it is in the 120s which does not correlate with her A1c. Patient is not sure exactly what medication she is taking. I do fear that she is not taking her medications correctly. We had a long discussion about this today. Patient is following with a care coordinator and she is getting set up with help at home. All questions were answered to patients satisfaction. Past Medical History:   Diagnosis Date    Anal fissure     Anemia 10/6/2017    Anxiety and depression     Arthritis     Asthma     Atrial fibrillation ([address]     Blood transfusion     long time no reaction    [facility]est[facility]     Diastolic    COPD (chronic obstructive pulmonary disease) ([facility])     Disc disorder     DM2 (diabetes mellitus, type 2) ([address]     on insulin    Fall due to stumbling 10/6/2017    GERD (gastroesophageal reflux disease)     Hx of blood clots     Hyperlipidemia     Hypertension     Inappropriate sinus tachycardia     LVH (left ventricular hypertrophy)     moderate    Lymphadenitis, chronic 4/13/2018    Occipital neuralgia 11/2/2011    Respiratory acidosis 10/7/2017    Sinus tachycardia 2/16/2015       Past Surgical History:   Procedure Laterality Date    ANOSCOPY  10/25/2006    injection of anal sphincter with Botox, Franklyn Ortiz/Timmy, [facility]    ANOSCOPY  4/9/2007    injection of anal sphincter with albuterol sulfate  (90 Base) MCG/ACT inhaler Inhale 2 puffs into the lungs 4 times daily as needed for Wheezing 1 Inhaler 5    blood glucose test strips (ASCENSIA AUTODISC VI;ONE TOUCH ULTRA TEST VI) strip Use 4 times daily 100 strip 5    ipratropium-albuterol (DUONEB) 0.5-2.5 (3) MG/3ML SOLN nebulizer solution INHALE 3 MLS INTO THE LUNGS EVERY 4 HOURS AS NEEDED FOR SHORTNESS OF BREATH 360 mL 2    insulin lispro (HUMALOG) 100 UNIT/ML injection vial Inject 50 Units into the skin 3 times daily (before meals) 1 vial 3    diltiazem (CARDIZEM CD) 240 MG extended release capsule Take 1 capsule by mouth daily 30 capsule 3    spironolactone (ALDACTONE) 25 MG tablet Take 1 tablet by mouth daily 30 tablet 5    nystatin (MYCOSTATIN) 330822 UNIT/GM cream Apply topically 2 times daily. 1 Tube 0    lansoprazole (PREVACID) 30 MG delayed release capsule Take 1 capsule by mouth daily 90 capsule 1    ferrous sulfate 325 (65 Fe) MG tablet TAKE 1 TABLET BY MOUTH EVERY DAY WITH BREAKFAST 30 tablet 0    vitamin D (ERGOCALCIFEROL) 1.25 MG (60888 UT) CAPS capsule Take 1 capsule by mouth Twice a Week 12 capsule 1    Icosapent Ethyl (VASCEPA) 1 g CAPS capsule Take 2 capsules by mouth 2 times daily 60 capsule 3    ULTICARE ALCOHOL SWABS 70 % PADS USE 3 TIMES A DAY  5    BD INSULIN SYRINGE U/F 31G X 5/16\" 1 ML MISC USE AS DIRECTED  0    Insulin Pen Needle 31G X 8 MM MISC 1 each by Does not apply route daily 100 each 3    traZODone (DESYREL) 50 MG tablet Take 1 tablet by mouth nightly as needed for Sleep 30 tablet 5    miconazole (MICOTIN) 2 % powder Apply topically 2 times daily.  45 g 1    metoprolol succinate (TOPROL XL) 200 MG extended release tablet Take 0.5 tablets by mouth 2 times daily 90 tablet 3    acetaminophen (TYLENOL) 325 MG tablet Take 650 mg by mouth every 6 hours as needed for Pain      glucose (GLUTOSE) 40 % GEL Take 37.5 mLs by mouth as needed (hypoglycemia) 45 g 0    insulin glargine (LANTUS) 100 UNIT/ML injection vial Inject 100 Units into the skin nightly 1 vial 3    OXYGEN Inhale 4 L into the lungs continuous       Elastic Bandages & Supports MISC 20-30 mmHg bilateral compression stockings  Size to fit  Dx:  Edema  Knee high 2 each 0    gabapentin (NEURONTIN) 100 MG capsule Take 1 capsule by mouth 3 times daily for 30 days. 270 capsule 0     No current facility-administered medications for this visit. Allergies   Allergen Reactions    Statins Other (See Comments)     Muscle pains       Health Maintenance Due   Topic Date Due    DTaP/Tdap/Td vaccine (1 - Tdap) 12/02/1964    Shingles Vaccine (2 of 3) 02/27/2015    Diabetic retinal exam  04/27/2018    Breast cancer screen  11/04/2018    DEXA (modify frequency per FRAX score)  12/02/2018    Diabetic foot exam  05/16/2019    Diabetic microalbuminuria test  05/16/2019           REVIEW OF SYSTEMS  Review of Systems   Constitutional: Positive for fatigue. Negative for chills and fever. HENT: Negative for congestion, postnasal drip, sinus pressure, sinus pain and sore throat. Eyes: Negative for pain. Respiratory: Negative for cough and shortness of breath. Cardiovascular: Positive for leg swelling. Negative for chest pain. Gastrointestinal: Negative for abdominal pain, blood in stool, constipation, diarrhea, nausea and vomiting. Endocrine: Negative for cold intolerance and heat intolerance. Genitourinary: Negative for difficulty urinating, dysuria, frequency and menstrual problem. Musculoskeletal: Positive for arthralgias, back pain and neck pain. Negative for myalgias. Skin: Negative for color change and wound. Allergic/Immunologic: Positive for environmental allergies. Neurological: Negative for dizziness, weakness, light-headedness, numbness and headaches. Hematological: Negative for adenopathy. Psychiatric/Behavioral: Negative for behavioral problems, dysphoric mood and sleep disturbance.  The patient is not vaccinations. Advised patient regarding importance of keeping up with recommended health maintenance and toschedule as soon as possible if overdue, as this is important in assessing for undiagnosed pathology, especially cancer, as well as protecting against potentially harmful/life threatening disease.          Patient and/or guardian verbalizes understanding and agrees with above counseling, assessment and plan.     All questions answered. Garland 5, DO  1/23/20    NOTE: This report was transcribed using voice recognition software. Every effort was made to ensure accuracy; however, inadvertent computerized transcription errors may be present    On the basis of positive falls risk screening, assessment and plan is as follows: home safety tips provided.

## 2020-01-24 RX ORDER — ERGOCALCIFEROL 1.25 MG/1
CAPSULE ORAL
Qty: 8 CAPSULE | Refills: 2 | Status: SHIPPED
Start: 2020-01-24 | End: 2020-11-17

## 2020-01-24 RX ORDER — FERROUS SULFATE 325(65) MG
TABLET ORAL
Qty: 30 TABLET | Refills: 0 | Status: SHIPPED
Start: 2020-01-24 | End: 2020-08-21 | Stop reason: SDUPTHER

## 2020-01-24 RX ORDER — DILTIAZEM HYDROCHLORIDE 240 MG/1
CAPSULE, COATED, EXTENDED RELEASE ORAL
Qty: 60 CAPSULE | Refills: 0 | Status: SHIPPED
Start: 2020-01-24 | End: 2020-03-17

## 2020-01-28 ENCOUNTER — TELEPHONE (OUTPATIENT)
Dept: FAMILY MEDICINE CLINIC | Age: 65
End: 2020-01-28

## 2020-01-29 ENCOUNTER — CARE COORDINATION (OUTPATIENT)
Dept: CARE COORDINATION | Age: 65
End: 2020-01-29

## 2020-01-29 NOTE — CARE COORDINATION
Nya Eason from [place] contacted this ACM stating that she did go to patient's home on 01/28/20 (She did schedule this appointment with the patient last week). Nya Eason stated that the patient was not able to participate in the discussion about her medications. She stated that the patient said that she just got up, and the patient was closing her eyes and not answering the questions Nya Eason was asking. Nya Eason stated that she stopped the discussion and left the home. She said she did try to reschedule for another date, however, the patient was not interested. Nya Eason stated if the patient does want this service, AC can make the referral again. AC agreed with this plan.

## 2020-01-29 NOTE — CARE COORDINATION
Jay Maya from [address] [place] [address] contacted this ACM stating that patient has refused this service. ACM informed PCP of this information and PCP stated that patient also refused [facility] for SN, PT/OT eval and Tele-Health services.

## 2020-01-30 ENCOUNTER — CARE COORDINATION (OUTPATIENT)
Dept: CARE COORDINATION | Age: 65
End: 2020-01-30

## 2020-02-06 ENCOUNTER — CARE COORDINATION (OUTPATIENT)
Dept: CARE COORDINATION | Age: 65
End: 2020-02-06

## 2020-02-06 NOTE — LETTER
February 6, 2020    [address] [facility] [address]      Dear Eddie Joe:    I have been unable to reach you by phone for our care coordination follow up telephone call. I have missed our conversations and am interested in your health. Please call me at [phone] or office phone: [phone]., so that we can continue to work together to improve your health. If you have any questions or concerns, please don't hesitate to call.     Sincerely,        Scarlet Huggins RN

## 2020-02-13 ENCOUNTER — CARE COORDINATION (OUTPATIENT)
Dept: CARE COORDINATION | Age: 65
End: 2020-02-13

## 2020-02-13 NOTE — CARE COORDINATION
ACM left a voice message with contact information as a follow up to trying to reach letter.       PLAN:   Continue to attempt to contact patient

## 2020-02-13 NOTE — CARE COORDINATION
Patient returned this AC's call. ACM attempted to review status of DM, CHF, and COPD. Patient states she is not checking her blood sugars due to she does not know how to use the machine ACM gave her. ACM stated the machine is the same type of machine that she had previously. Patient stated she will try to use it and if she cannot get it to work, she will come into PCP office for education. Patient is not weighing herself, and is not compliant with monitoring her diet. She states she refused all services that [facility] set up d/t she was receiving Lymphedema care from [place] [facility]. AC educated patient that she was eligible for case management services, palliative care services, medication services through the mail, and tele-health services could have been initiated when [facility] home care was completed. Patient states he does not think she wants Care Coordination at this time. ACM ensured patient had [facility]'s contact information should she want the program in the future. PLAN  [facility] will inform PCP that patient will be disenrolled from Care Coordination d/t disengaged with managing her health care needs. [facility] will send discharge letter to patient.

## 2020-02-13 NOTE — LETTER
2/13/2020    [address] [place] [address]     We have appreciated working with you regarding your health. As you have chosen not to follow recommendations for support and services, care coordination is unable to contribute anything further to your plan of care at this time. We support your right to make decisions about your health and your healthcare plan. Because of this, your nurse care coordinator will no longer be actively providing you with care coordination services. However, if you should wish to take advantage of this program in the future, please do not hesitate to contact Garland De La Cruz DO, your primary care provider. We wish you best of health in the future. Garland De La Cruz DO and his/her team will continue to provide care and be available for questions. Please let your doctors office know if you have any health concerns or if you need to schedule an appointment. You can reach the office at [phone].     In good health,     Khoa Paredes RN

## 2020-02-18 ENCOUNTER — APPOINTMENT (OUTPATIENT)
Dept: CT IMAGING | Age: 65
End: 2020-02-18
Payer: COMMERCIAL

## 2020-02-18 ENCOUNTER — HOSPITAL ENCOUNTER (EMERGENCY)
Age: 65
Discharge: HOME | End: 2020-02-18
Payer: COMMERCIAL

## 2020-02-18 VITALS
TEMPERATURE: 98 F | WEIGHT: 290 LBS | BODY MASS INDEX: 51.38 KG/M2 | SYSTOLIC BLOOD PRESSURE: 186 MMHG | HEART RATE: 94 BPM | RESPIRATION RATE: 18 BRPM | OXYGEN SATURATION: 99 % | DIASTOLIC BLOOD PRESSURE: 74 MMHG | HEIGHT: 63 IN

## 2020-02-18 PROCEDURE — 99283 EMERGENCY DEPT VISIT LOW MDM: CPT

## 2020-02-18 PROCEDURE — 6370000000 HC RX 637 (ALT 250 FOR IP): Performed by: PHYSICIAN ASSISTANT

## 2020-02-18 PROCEDURE — 72192 CT PELVIS W/O DYE: CPT

## 2020-02-18 RX ORDER — IBUPROFEN 800 MG/1
800 TABLET ORAL EVERY 6 HOURS PRN
Qty: 20 TABLET | Refills: 0 | Status: ON HOLD | OUTPATIENT
Start: 2020-02-18 | End: 2020-05-29

## 2020-02-18 RX ORDER — OXYCODONE HYDROCHLORIDE AND ACETAMINOPHEN 5; 325 MG/1; MG/1
1 TABLET ORAL ONCE
Status: COMPLETED | OUTPATIENT
Start: 2020-02-18 | End: 2020-02-18

## 2020-02-18 RX ORDER — CEPHALEXIN 500 MG/1
500 CAPSULE ORAL 4 TIMES DAILY
Qty: 28 CAPSULE | Refills: 0 | Status: SHIPPED | OUTPATIENT
Start: 2020-02-18 | End: 2020-02-25

## 2020-02-18 RX ADMIN — OXYCODONE HYDROCHLORIDE AND ACETAMINOPHEN 1 TABLET: 5; 325 TABLET ORAL at 02:01

## 2020-02-18 ASSESSMENT — PAIN SCALES - GENERAL
PAINLEVEL_OUTOF10: 4
PAINLEVEL_OUTOF10: 9
PAINLEVEL_OUTOF10: 9

## 2020-02-18 ASSESSMENT — PAIN DESCRIPTION - LOCATION
LOCATION: COCCYX
LOCATION: COCCYX

## 2020-02-18 NOTE — ED PROVIDER NOTES
Vitals [02/18/20 0058]   BP Temp Temp Source Pulse Resp SpO2 Height Weight   (!) 175/74 98 °F (36.7 °C) Oral 94 20 96 % 5' 3\" (1.6 m) 290 lb (131.5 kg)       Physical Exam  · Constitutional/General: Alert and oriented x3, well appearing, non toxic  · HEENT:  NC/NT. PERRLA,  Airway patent. · Neck: Supple, full ROM, non tender to palpation in the midline, no stridor, no crepitus, no meningeal signs  · Respiratory: Lungs clear to auscultation bilaterally, no wheezes, rales, or rhonchi. Not in respiratory distress  · CV:  Regular rate. Regular rhythm. No murmurs, gallops, or rubs. 2+ distal pulses  · Musculoskeletal: Moves all extremities x 4. Warm and well perfused, no clubbing, cyanosis, or edema. Capillary refill <3 seconds  · Integument: skin warm and dry. No rashes. In the intergluteal cleft there is bright pink color to the skin, damp, a few scattered very small ulcerations, no bleeding or hot erythema, The area is very tender to palpation. · Lymphatic: no lymphadenopathy noted  · Neurologic: GCS 15, no focal deficits, symmetric strength 5/5 in the upper and lower extremities bilaterally  · Psychiatric: Normal Affect    Lab / Imaging Results   (All laboratory and radiology results have been personally reviewed by myself)  Labs:  No results found for this visit on 02/18/20. Imaging: All Radiology results interpreted by Radiologist unless otherwise noted. CT PELVIS WO CONTRAST Additional Contrast? None   Final Result   No evidence of acute pathology. This report has been electronically signed by Saw Sher MD.          ED Course / Medical Decision Making     Medications   oxyCODONE-acetaminophen (PERCOCET) 5-325 MG per tablet 1 tablet (1 tablet Oral Given 2/18/20 0201)        Re-examination:  2/18/20        Pt had pain control      Consult(s):   None    Procedure(s):   none    MDM:   PT presents with tailbone area pain, no trauma, pt is sedentary primary in chair due to may other medical conditions.

## 2020-02-18 NOTE — ED NOTES
Bed: 02  Expected date:   Expected time:   Means of arrival:   Comments:  EMS     Haleigh Costa RN  02/18/20 3980

## 2020-02-19 ENCOUNTER — TELEPHONE (OUTPATIENT)
Dept: ADMINISTRATIVE | Age: 65
End: 2020-02-19

## 2020-02-19 NOTE — CARE COORDINATION
ACM spoke face to face with PCP regarding this patient. PCP agrees that patient is disengaged with managing her healthcare. ACM also informed PCP that patient does not feel that she wants to participate in Care Coordination. PCP agrees with disenrollment from Care Coordination.

## 2020-02-26 ENCOUNTER — HOSPITAL ENCOUNTER (EMERGENCY)
Age: 65
Discharge: HOME | End: 2020-02-26
Attending: EMERGENCY MEDICINE
Payer: COMMERCIAL

## 2020-02-26 ENCOUNTER — APPOINTMENT (OUTPATIENT)
Dept: GENERAL RADIOLOGY | Age: 65
End: 2020-02-26
Payer: COMMERCIAL

## 2020-02-26 VITALS
BODY MASS INDEX: 51.38 KG/M2 | SYSTOLIC BLOOD PRESSURE: 138 MMHG | RESPIRATION RATE: 14 BRPM | TEMPERATURE: 98.3 F | OXYGEN SATURATION: 95 % | DIASTOLIC BLOOD PRESSURE: 101 MMHG | HEART RATE: 104 BPM | HEIGHT: 63 IN | WEIGHT: 290 LBS

## 2020-02-26 LAB
ANION GAP SERPL CALCULATED.3IONS-SCNC: 13 MMOL/L (ref 7–16)
BASOPHILS ABSOLUTE: 0.02 E9/L (ref 0–0.2)
BASOPHILS RELATIVE PERCENT: 0.3 % (ref 0–2)
BUN BLDV-MCNC: 25 MG/DL (ref 8–23)
CALCIUM SERPL-MCNC: 9.5 MG/DL (ref 8.6–10.2)
CHLORIDE BLD-SCNC: 83 MMOL/L (ref 98–107)
CHP ED QC CHECK: NORMAL
CHP ED QC CHECK: NORMAL
CO2: 28 MMOL/L (ref 22–29)
CREAT SERPL-MCNC: 0.8 MG/DL (ref 0.5–1)
EOSINOPHILS ABSOLUTE: 0.06 E9/L (ref 0.05–0.5)
EOSINOPHILS RELATIVE PERCENT: 0.9 % (ref 0–6)
GFR AFRICAN AMERICAN: >60
GFR NON-AFRICAN AMERICAN: >60 ML/MIN/1.73
GLUCOSE BLD-MCNC: 498 MG/DL
GLUCOSE BLD-MCNC: 675 MG/DL (ref 74–99)
HCT VFR BLD CALC: 32.1 % (ref 34–48)
HEMOGLOBIN: 10.3 G/DL (ref 11.5–15.5)
IMMATURE GRANULOCYTES #: 0.03 E9/L
IMMATURE GRANULOCYTES %: 0.5 % (ref 0–5)
LYMPHOCYTES ABSOLUTE: 0.64 E9/L (ref 1.5–4)
LYMPHOCYTES RELATIVE PERCENT: 9.7 % (ref 20–42)
MCH RBC QN AUTO: 26.1 PG (ref 26–35)
MCHC RBC AUTO-ENTMCNC: 32.1 % (ref 32–34.5)
MCV RBC AUTO: 81.3 FL (ref 80–99.9)
METER GLUCOSE: 498 MG/DL (ref 74–99)
METER GLUCOSE: >500 MG/DL (ref 74–99)
METER GLUCOSE: >500 MG/DL (ref 74–99)
MONOCYTES ABSOLUTE: 0.36 E9/L (ref 0.1–0.95)
MONOCYTES RELATIVE PERCENT: 5.4 % (ref 2–12)
NEUTROPHILS ABSOLUTE: 5.52 E9/L (ref 1.8–7.3)
NEUTROPHILS RELATIVE PERCENT: 83.2 % (ref 43–80)
PDW BLD-RTO: 13.9 FL (ref 11.5–15)
PLATELET # BLD: 192 E9/L (ref 130–450)
PMV BLD AUTO: 9.7 FL (ref 7–12)
POTASSIUM SERPL-SCNC: 4.8 MMOL/L (ref 3.5–5)
PRO-BNP: 176 PG/ML (ref 0–125)
RBC # BLD: 3.95 E12/L (ref 3.5–5.5)
SODIUM BLD-SCNC: 124 MMOL/L (ref 132–146)
TROPONIN: <0.01 NG/ML (ref 0–0.03)
WBC # BLD: 6.6 E9/L (ref 4.5–11.5)

## 2020-02-26 PROCEDURE — 96376 TX/PRO/DX INJ SAME DRUG ADON: CPT

## 2020-02-26 PROCEDURE — 85025 COMPLETE CBC W/AUTO DIFF WBC: CPT

## 2020-02-26 PROCEDURE — 82962 GLUCOSE BLOOD TEST: CPT

## 2020-02-26 PROCEDURE — 99284 EMERGENCY DEPT VISIT MOD MDM: CPT

## 2020-02-26 PROCEDURE — 96374 THER/PROPH/DIAG INJ IV PUSH: CPT

## 2020-02-26 PROCEDURE — 96372 THER/PROPH/DIAG INJ SC/IM: CPT

## 2020-02-26 PROCEDURE — 6370000000 HC RX 637 (ALT 250 FOR IP): Performed by: EMERGENCY MEDICINE

## 2020-02-26 PROCEDURE — 71045 X-RAY EXAM CHEST 1 VIEW: CPT

## 2020-02-26 PROCEDURE — 83880 ASSAY OF NATRIURETIC PEPTIDE: CPT

## 2020-02-26 PROCEDURE — 94640 AIRWAY INHALATION TREATMENT: CPT

## 2020-02-26 PROCEDURE — 80048 BASIC METABOLIC PNL TOTAL CA: CPT

## 2020-02-26 PROCEDURE — 84484 ASSAY OF TROPONIN QUANT: CPT

## 2020-02-26 PROCEDURE — 2580000003 HC RX 258: Performed by: EMERGENCY MEDICINE

## 2020-02-26 RX ORDER — IPRATROPIUM BROMIDE AND ALBUTEROL SULFATE 2.5; .5 MG/3ML; MG/3ML
1 SOLUTION RESPIRATORY (INHALATION)
Status: COMPLETED | OUTPATIENT
Start: 2020-02-26 | End: 2020-02-26

## 2020-02-26 RX ORDER — INSULIN GLARGINE 100 [IU]/ML
100 INJECTION, SOLUTION SUBCUTANEOUS ONCE
Status: COMPLETED | OUTPATIENT
Start: 2020-02-26 | End: 2020-02-26

## 2020-02-26 RX ORDER — 0.9 % SODIUM CHLORIDE 0.9 %
500 INTRAVENOUS SOLUTION INTRAVENOUS ONCE
Status: COMPLETED | OUTPATIENT
Start: 2020-02-26 | End: 2020-02-26

## 2020-02-26 RX ORDER — ACETAMINOPHEN 500 MG
1000 TABLET ORAL ONCE
Status: COMPLETED | OUTPATIENT
Start: 2020-02-26 | End: 2020-02-26

## 2020-02-26 RX ORDER — GLUCOSAMINE HCL/CHONDROITIN SU 500-400 MG
CAPSULE ORAL
Qty: 100 STRIP | Refills: 0 | Status: SHIPPED | OUTPATIENT
Start: 2020-02-26 | End: 2020-11-17

## 2020-02-26 RX ADMIN — IPRATROPIUM BROMIDE AND ALBUTEROL SULFATE 1 AMPULE: .5; 3 SOLUTION RESPIRATORY (INHALATION) at 14:50

## 2020-02-26 RX ADMIN — ACETAMINOPHEN 1000 MG: 500 TABLET ORAL at 14:45

## 2020-02-26 RX ADMIN — IPRATROPIUM BROMIDE AND ALBUTEROL SULFATE 1 AMPULE: .5; 3 SOLUTION RESPIRATORY (INHALATION) at 14:45

## 2020-02-26 RX ADMIN — SODIUM CHLORIDE 500 ML: 9 INJECTION, SOLUTION INTRAVENOUS at 18:31

## 2020-02-26 RX ADMIN — IPRATROPIUM BROMIDE AND ALBUTEROL SULFATE 1 AMPULE: .5; 3 SOLUTION RESPIRATORY (INHALATION) at 14:48

## 2020-02-26 RX ADMIN — INSULIN HUMAN 10 UNITS: 100 INJECTION, SOLUTION PARENTERAL at 18:28

## 2020-02-26 RX ADMIN — INSULIN HUMAN 10 UNITS: 100 INJECTION, SOLUTION PARENTERAL at 17:21

## 2020-02-26 RX ADMIN — INSULIN HUMAN 10 UNITS: 100 INJECTION, SOLUTION PARENTERAL at 16:00

## 2020-02-26 RX ADMIN — INSULIN GLARGINE 100 UNITS: 100 INJECTION, SOLUTION SUBCUTANEOUS at 21:04

## 2020-02-26 ASSESSMENT — ENCOUNTER SYMPTOMS
EYE PAIN: 0
WHEEZING: 0
ABDOMINAL PAIN: 0
BLOOD IN STOOL: 0
SHORTNESS OF BREATH: 1
EYE REDNESS: 0
SINUS PRESSURE: 0
DIARRHEA: 0
ABDOMINAL DISTENTION: 0
RECTAL PAIN: 1
ANAL BLEEDING: 0
VOMITING: 0
EYE DISCHARGE: 0
COUGH: 0
NAUSEA: 0
SORE THROAT: 0
BACK PAIN: 0
CONSTIPATION: 0

## 2020-02-26 ASSESSMENT — PAIN SCALES - GENERAL
PAINLEVEL_OUTOF10: 3
PAINLEVEL_OUTOF10: 8
PAINLEVEL_OUTOF10: 8

## 2020-02-26 ASSESSMENT — PAIN DESCRIPTION - ORIENTATION: ORIENTATION: LOWER

## 2020-02-26 ASSESSMENT — PAIN DESCRIPTION - LOCATION
LOCATION: BACK
LOCATION: BACK

## 2020-02-26 ASSESSMENT — PAIN DESCRIPTION - PAIN TYPE: TYPE: ACUTE PAIN

## 2020-02-26 ASSESSMENT — PAIN DESCRIPTION - FREQUENCY: FREQUENCY: CONTINUOUS

## 2020-02-26 ASSESSMENT — PAIN DESCRIPTION - DESCRIPTORS: DESCRIPTORS: ACHING

## 2020-02-26 NOTE — ED PROVIDER NOTES
77-year-old female with a history of COPD, CHF, diabetes, hypertension, hyperlipidemia, lymphedema and A. Fib presents for evaluation of shortness of breath and rectal pain. Patient reports worsening shortness of breath the past couple days. There are no palliative provoking factors she does feels like she cannot catch her breath. She reports she is on her baseline 4 L nasal cannula oxygen. In addition she complains of over 1 week of rectal pain that is there constantly. She was seen last week for the same and prescribed Keflex, Motrin, antibiotic ointment without improvement. Patient has not followed up with her family doctor about this. She reports severe pain but not following with a pain specialist.  She does not see a pulmonologist for her COPD. She did just have her legs rewrapped by her home health care for her bilateral lymphedema today           Review of Systems   Constitutional: Negative for chills and fever. HENT: Negative for ear pain, sinus pressure and sore throat. Eyes: Negative for pain, discharge and redness. Respiratory: Positive for shortness of breath. Negative for cough and wheezing. Cardiovascular: Negative for chest pain and palpitations. Leg swelling: at baseline. Gastrointestinal: Positive for rectal pain. Negative for abdominal distention, abdominal pain, anal bleeding, blood in stool, constipation, diarrhea, nausea and vomiting. Genitourinary: Negative for dysuria and frequency. Musculoskeletal: Negative for arthralgias and back pain. Skin: Negative for rash and wound. Neurological: Negative for weakness and headaches. Hematological: Negative for adenopathy. All other systems reviewed and are negative. Physical Exam  Vitals signs and nursing note reviewed. Constitutional:       Appearance: She is well-developed. HENT:      Head: Normocephalic and atraumatic.    Eyes:      Conjunctiva/sclera: Conjunctivae normal.   Neck:      Musculoskeletal: (N/A, 11/9/2018); and pr debridement, skin, sub-q tissue,muscle,=<20 sq cm (Left, 11/30/2018). Social History:  reports that she quit smoking about 15 years ago. Her smoking use included cigarettes. She started smoking about 40 years ago. She has a 37.50 pack-year smoking history. She has never used smokeless tobacco. She reports that she does not drink alcohol or use drugs. Family History: family history includes Colon Cancer in her father and sister; Diabetes in her father, paternal aunt, paternal aunt, paternal uncle, and paternal uncle; Heart Disease in her mother; Other in her father. The patients home medications have been reviewed.     Allergies: Statins    -------------------------------------------------- RESULTS -------------------------------------------------  Labs:  Results for orders placed or performed during the hospital encounter of 02/26/20   CBC Auto Differential   Result Value Ref Range    WBC 6.6 4.5 - 11.5 E9/L    RBC 3.95 3.50 - 5.50 E12/L    Hemoglobin 10.3 (L) 11.5 - 15.5 g/dL    Hematocrit 32.1 (L) 34.0 - 48.0 %    MCV 81.3 80.0 - 99.9 fL    MCH 26.1 26.0 - 35.0 pg    MCHC 32.1 32.0 - 34.5 %    RDW 13.9 11.5 - 15.0 fL    Platelets 524 510 - 247 E9/L    MPV 9.7 7.0 - 12.0 fL    Neutrophils % 83.2 (H) 43.0 - 80.0 %    Immature Granulocytes % 0.5 0.0 - 5.0 %    Lymphocytes % 9.7 (L) 20.0 - 42.0 %    Monocytes % 5.4 2.0 - 12.0 %    Eosinophils % 0.9 0.0 - 6.0 %    Basophils % 0.3 0.0 - 2.0 %    Neutrophils Absolute 5.52 1.80 - 7.30 E9/L    Immature Granulocytes # 0.03 E9/L    Lymphocytes Absolute 0.64 (L) 1.50 - 4.00 E9/L    Monocytes Absolute 0.36 0.10 - 0.95 E9/L    Eosinophils Absolute 0.06 0.05 - 0.50 E9/L    Basophils Absolute 0.02 0.00 - 0.20 B2/C   Basic Metabolic Panel   Result Value Ref Range    Sodium 124 (L) 132 - 146 mmol/L    Potassium 4.8 3.5 - 5.0 mmol/L    Chloride 83 (L) 98 - 107 mmol/L    CO2 28 22 - 29 mmol/L    Anion Gap 13 7 - 16 mmol/L    Glucose 675 (HH) 74 - 99 not refilled them yet. [MF]   2013 Patient's blood sugar is still elevated but is coming down. Had long discussion with patient regarding importance of medication compliance and the dangers of having such elevated sugars. Was offered admission for glucose control however patient states she wants to go home. Patient states she will  new needles tomorrow morning and be better about being compliant with her insulin    [MF]      ED Course User Index  [MF] Tami Gunter,              Medical Decision Making:    8year-old male who presents for evaluation of rectal pain found to have a anal fissure at the 12 o'clock position. Patient complains of shortness of breath and is in a mild COPD exacerbation she is on her baseline O2 but was given breathing treatments with improvement. Patient is otherwise well-appearing nontoxic ambulating without difficulty with our department however laboratory studies revealed severe uncontrolled hyperglycemia secondary to patient noncompliance with her insulin regimen. Patient given several rounds of IV insulin as well as a small amount of fluid bolus not to exacerbate her CHF. Once glucose under better control patient was discharged home in stable condition     At this time the patient is without objective evidence of an acute process requiring hospitalization or inpatient management. They have remained hemodynamically stable throughout their entire ED visit and are stable for discharge with outpatient follow-up. The plan has been discussed in detail and they are aware of the specific conditions for emergent return, as well as the importance of follow-up. Counseling: The emergency provider has spoken with the patient and discussed todays results, in addition to providing specific details for the plan of care and counseling regarding the diagnosis and prognosis. Questions are answered at this time and they are agreeable with the plan.    I discussed at length with them reasons for immediate return here for re evaluation. They will followup with their primary care physician by calling their office tomorrow. --------------------------------- IMPRESSION AND DISPOSITION ---------------------------------    IMPRESSION  1. Anal fissure    2. Uncontrolled type 2 diabetes mellitus with hyperglycemia ([facility]    3. COPD exacerbation ([facility]        DISPOSITION  Disposition: Discharge to home  Patient condition is stable    Discharge Medication List as of 2/26/2020  8:34 PM      START taking these medications    Details   !! Insulin Pen Needle 30G X 8 MM MISC DAILY Starting Wed 2/26/2020, Disp-100 each, R-0, Print      !! blood glucose monitor strips Test 4 times a day & as needed for symptoms of irregular blood glucose., Disp-100 strip, R-0, Print       !! - Potential duplicate medications found. Please discuss with provider. NOTE: This report was transcribed using voice recognition software.  Every effort was made to ensure accuracy; however, inadvertent computerized transcription errors may be present           Sharmila Powers DO  Resident  02/29/20 1945

## 2020-02-26 NOTE — ED NOTES
Bed: 04  Expected date:   Expected time:   Means of arrival:   Comments:  JUSTIN Andersen RN  02/26/20 [id number]

## 2020-02-26 NOTE — ED NOTES
Up to BR with assistance. awaitng respiratory. Pt only co is tailbone pain.       Darin Pedraza RN  02/26/20 0515

## 2020-03-03 ENCOUNTER — TELEPHONE (OUTPATIENT)
Dept: FAMILY MEDICINE CLINIC | Age: 65
End: 2020-03-03

## 2020-03-11 ENCOUNTER — TELEPHONE (OUTPATIENT)
Dept: FAMILY MEDICINE CLINIC | Age: 65
End: 2020-03-11

## 2020-03-17 RX ORDER — DILTIAZEM HYDROCHLORIDE 240 MG/1
CAPSULE, COATED, EXTENDED RELEASE ORAL
Qty: 60 CAPSULE | Refills: 0 | Status: SHIPPED
Start: 2020-03-17 | End: 2020-04-09

## 2020-03-18 ENCOUNTER — TELEPHONE (OUTPATIENT)
Dept: FAMILY MEDICINE CLINIC | Age: 65
End: 2020-03-18

## 2020-03-18 NOTE — TELEPHONE ENCOUNTER
I did call the pt to cancel her appt next week per Dr Valeriy Mtz but pt states she like to been seen she has had difficulty breathing she states due too CHF, NO cough No fever No Congestion, do we leave the pt on for next week?

## 2020-03-25 ENCOUNTER — TELEPHONE (OUTPATIENT)
Dept: FAMILY MEDICINE CLINIC | Age: 65
End: 2020-03-25

## 2020-03-25 PROBLEM — K21.9 GASTROESOPHAGEAL REFLUX DISEASE: Status: RESOLVED | Noted: 2020-03-25 | Resolved: 2020-03-24

## 2020-03-26 RX ORDER — GABAPENTIN 100 MG/1
100 CAPSULE ORAL 3 TIMES DAILY
Qty: 270 CAPSULE | Refills: 0 | Status: SHIPPED
Start: 2020-03-26 | End: 2020-05-28

## 2020-04-09 RX ORDER — DILTIAZEM HYDROCHLORIDE 240 MG/1
CAPSULE, COATED, EXTENDED RELEASE ORAL
Qty: 60 CAPSULE | Refills: 0 | Status: SHIPPED
Start: 2020-04-09 | End: 2020-05-05

## 2020-05-12 ENCOUNTER — TELEPHONE (OUTPATIENT)
Dept: FAMILY MEDICINE CLINIC | Age: 65
End: 2020-05-12

## 2020-05-12 NOTE — TELEPHONE ENCOUNTER
Pt  Called requesting refills on Humalog and Lantus to [facility] on [address] thanks

## 2020-05-14 RX ORDER — IPRATROPIUM BROMIDE AND ALBUTEROL SULFATE 2.5; .5 MG/3ML; MG/3ML
SOLUTION RESPIRATORY (INHALATION)
Qty: 360 ML | Refills: 2 | Status: SHIPPED
Start: 2020-05-14 | End: 2020-09-30 | Stop reason: SDUPTHER

## 2020-05-14 RX ORDER — BUMETANIDE 1 MG/1
TABLET ORAL
Qty: 180 TABLET | Refills: 1 | Status: ON HOLD
Start: 2020-05-14 | End: 2020-08-20 | Stop reason: HOSPADM

## 2020-05-18 RX ORDER — TRAZODONE HYDROCHLORIDE 50 MG/1
TABLET ORAL
Qty: 30 TABLET | Refills: 5 | Status: SHIPPED
Start: 2020-05-18 | End: 2020-11-17

## 2020-05-19 ENCOUNTER — TELEPHONE (OUTPATIENT)
Dept: FAMILY MEDICINE CLINIC | Age: 65
End: 2020-05-19

## 2020-05-19 NOTE — TELEPHONE ENCOUNTER
Pt called left voice message states she has sores in her mouth and would like medication sent to [facility], thanks

## 2020-05-26 ENCOUNTER — HOSPITAL ENCOUNTER (OUTPATIENT)
Age: 65
Discharge: HOME | DRG: 364 | End: 2020-05-28
Payer: COMMERCIAL

## 2020-05-26 ENCOUNTER — TELEPHONE (OUTPATIENT)
Dept: FAMILY MEDICINE CLINIC | Age: 65
End: 2020-05-26

## 2020-05-26 ENCOUNTER — OFFICE VISIT (OUTPATIENT)
Dept: FAMILY MEDICINE CLINIC | Age: 65
End: 2020-05-26
Payer: COMMERCIAL

## 2020-05-26 VITALS
TEMPERATURE: 97.4 F | DIASTOLIC BLOOD PRESSURE: 71 MMHG | BODY MASS INDEX: 51.37 KG/M2 | HEART RATE: 98 BPM | RESPIRATION RATE: 20 BRPM | HEIGHT: 63 IN | SYSTOLIC BLOOD PRESSURE: 132 MMHG

## 2020-05-26 PROBLEM — J45.901 MODERATE ASTHMA WITH EXACERBATION: Status: RESOLVED | Noted: 2020-01-23 | Resolved: 2020-05-26

## 2020-05-26 PROBLEM — J96.11 CHRONIC RESPIRATORY FAILURE WITH HYPOXIA, ON HOME O2 THERAPY (HCC): Status: ACTIVE | Noted: 2020-05-26

## 2020-05-26 PROBLEM — Z99.81 CHRONIC RESPIRATORY FAILURE WITH HYPOXIA, ON HOME O2 THERAPY (HCC): Status: ACTIVE | Noted: 2020-05-26

## 2020-05-26 LAB
ALBUMIN SERPL-MCNC: 3.6 G/DL (ref 3.5–5.2)
ALP BLD-CCNC: 110 U/L (ref 35–104)
ALT SERPL-CCNC: 7 U/L (ref 0–32)
ANION GAP SERPL CALCULATED.3IONS-SCNC: 19 MMOL/L (ref 7–16)
AST SERPL-CCNC: 11 U/L (ref 0–31)
BASOPHILS ABSOLUTE: 0.03 E9/L (ref 0–0.2)
BASOPHILS RELATIVE PERCENT: 0.3 % (ref 0–2)
BILIRUB SERPL-MCNC: 0.3 MG/DL (ref 0–1.2)
BUN BLDV-MCNC: 23 MG/DL (ref 8–23)
CALCIUM SERPL-MCNC: 9.9 MG/DL (ref 8.6–10.2)
CHLORIDE BLD-SCNC: 84 MMOL/L (ref 98–107)
CHOLESTEROL, TOTAL: 287 MG/DL (ref 0–199)
CO2: 24 MMOL/L (ref 22–29)
CREAT SERPL-MCNC: 0.8 MG/DL (ref 0.5–1)
EOSINOPHILS ABSOLUTE: 0.06 E9/L (ref 0.05–0.5)
EOSINOPHILS RELATIVE PERCENT: 0.7 % (ref 0–6)
GFR AFRICAN AMERICAN: >60
GFR NON-AFRICAN AMERICAN: >60 ML/MIN/1.73
GLUCOSE BLD-MCNC: 634 MG/DL (ref 74–99)
HCT VFR BLD CALC: 32.3 % (ref 34–48)
HDLC SERPL-MCNC: 33 MG/DL
HEMOGLOBIN: 9.4 G/DL (ref 11.5–15.5)
IMMATURE GRANULOCYTES #: 0.04 E9/L
IMMATURE GRANULOCYTES %: 0.4 % (ref 0–5)
LDL CHOLESTEROL CALCULATED: ABNORMAL MG/DL (ref 0–99)
LYMPHOCYTES ABSOLUTE: 0.84 E9/L (ref 1.5–4)
LYMPHOCYTES RELATIVE PERCENT: 9.2 % (ref 20–42)
MCH RBC QN AUTO: 25 PG (ref 26–35)
MCHC RBC AUTO-ENTMCNC: 29.1 % (ref 32–34.5)
MCV RBC AUTO: 85.9 FL (ref 80–99.9)
MONOCYTES ABSOLUTE: 0.35 E9/L (ref 0.1–0.95)
MONOCYTES RELATIVE PERCENT: 3.9 % (ref 2–12)
NEUTROPHILS ABSOLUTE: 7.77 E9/L (ref 1.8–7.3)
NEUTROPHILS RELATIVE PERCENT: 85.5 % (ref 43–80)
PDW BLD-RTO: 15 FL (ref 11.5–15)
PLATELET # BLD: 309 E9/L (ref 130–450)
PMV BLD AUTO: 10.4 FL (ref 7–12)
POTASSIUM SERPL-SCNC: 5.1 MMOL/L (ref 3.5–5)
RBC # BLD: 3.76 E12/L (ref 3.5–5.5)
SODIUM BLD-SCNC: 127 MMOL/L (ref 132–146)
TOTAL PROTEIN: 7.4 G/DL (ref 6.4–8.3)
TRIGL SERPL-MCNC: 512 MG/DL (ref 0–149)
TSH SERPL DL<=0.05 MIU/L-ACNC: 2.24 UIU/ML (ref 0.27–4.2)
VLDLC SERPL CALC-MCNC: ABNORMAL MG/DL
WBC # BLD: 9.1 E9/L (ref 4.5–11.5)

## 2020-05-26 PROCEDURE — 99215 OFFICE O/P EST HI 40 MIN: CPT | Performed by: FAMILY MEDICINE

## 2020-05-26 PROCEDURE — G8417 CALC BMI ABV UP PARAM F/U: HCPCS | Performed by: FAMILY MEDICINE

## 2020-05-26 PROCEDURE — G8926 SPIRO NO PERF OR DOC: HCPCS | Performed by: FAMILY MEDICINE

## 2020-05-26 PROCEDURE — G8427 DOCREV CUR MEDS BY ELIG CLIN: HCPCS | Performed by: FAMILY MEDICINE

## 2020-05-26 PROCEDURE — 2022F DILAT RTA XM EVC RTNOPTHY: CPT | Performed by: FAMILY MEDICINE

## 2020-05-26 PROCEDURE — 3023F SPIROM DOC REV: CPT | Performed by: FAMILY MEDICINE

## 2020-05-26 PROCEDURE — 1123F ACP DISCUSS/DSCN MKR DOCD: CPT | Performed by: FAMILY MEDICINE

## 2020-05-26 PROCEDURE — 80061 LIPID PANEL: CPT

## 2020-05-26 PROCEDURE — 84443 ASSAY THYROID STIM HORMONE: CPT

## 2020-05-26 PROCEDURE — 4040F PNEUMOC VAC/ADMIN/RCVD: CPT | Performed by: FAMILY MEDICINE

## 2020-05-26 PROCEDURE — 83036 HEMOGLOBIN GLYCOSYLATED A1C: CPT

## 2020-05-26 PROCEDURE — 1090F PRES/ABSN URINE INCON ASSESS: CPT | Performed by: FAMILY MEDICINE

## 2020-05-26 PROCEDURE — 85025 COMPLETE CBC W/AUTO DIFF WBC: CPT

## 2020-05-26 PROCEDURE — 1036F TOBACCO NON-USER: CPT | Performed by: FAMILY MEDICINE

## 2020-05-26 PROCEDURE — 82306 VITAMIN D 25 HYDROXY: CPT

## 2020-05-26 PROCEDURE — 3046F HEMOGLOBIN A1C LEVEL >9.0%: CPT | Performed by: FAMILY MEDICINE

## 2020-05-26 PROCEDURE — G8400 PT W/DXA NO RESULTS DOC: HCPCS | Performed by: FAMILY MEDICINE

## 2020-05-26 PROCEDURE — 3017F COLORECTAL CA SCREEN DOC REV: CPT | Performed by: FAMILY MEDICINE

## 2020-05-26 PROCEDURE — 80053 COMPREHEN METABOLIC PANEL: CPT

## 2020-05-26 RX ORDER — GABAPENTIN 100 MG/1
100 CAPSULE ORAL 3 TIMES DAILY
COMMUNITY
Start: 2020-05-06 | End: 2020-05-28

## 2020-05-26 RX ORDER — BLOOD-GLUCOSE METER
KIT MISCELLANEOUS
Qty: 1 KIT | Refills: 0 | Status: SHIPPED
Start: 2020-05-26 | End: 2020-11-17

## 2020-05-26 RX ORDER — CLINDAMYCIN HYDROCHLORIDE 300 MG/1
300 CAPSULE ORAL 3 TIMES DAILY
Qty: 21 CAPSULE | Refills: 0 | Status: ON HOLD
Start: 2020-05-26 | End: 2020-06-01 | Stop reason: HOSPADM

## 2020-05-26 ASSESSMENT — ENCOUNTER SYMPTOMS
COLOR CHANGE: 0
BACK PAIN: 1
NAUSEA: 0
SHORTNESS OF BREATH: 0
SORE THROAT: 0
EYE PAIN: 0
DIARRHEA: 0
VOMITING: 0
SINUS PRESSURE: 0
COUGH: 0
CONSTIPATION: 0
SINUS PAIN: 0
BLOOD IN STOOL: 0
ABDOMINAL PAIN: 0

## 2020-05-26 NOTE — PROGRESS NOTES
Margarita Burgos  : 1955    Chief Complaint:     Chief Complaint   Patient presents with    Wound Check     right buttocks area    Mouth Lesions       HPI  Margarita Burgos 75 y.o. presents for   Chief Complaint   Patient presents with    Wound Check     right buttocks area    Mouth Lesions     Patient has multiple complaints today. She does not follow-up on a regular basis to her appointments. She states she has a wound on her right buttock that has been present for some time. She also has lymphedema of both lower extremities and has noted increase in swelling. She has not been checking her sugars but has been taking her insulin. She admits to not watching her diet. She again is not compliant with appointments and/or medications. We had a long discussion today as she has difficulty getting rides to appointments. I do think at this point time it would better serve her to have a physician that is able to come out to her house. She is agreeable to this. She currently denies any dizziness, lightheadedness, chest pain, shortness of breath. She does have swelling in her lower extremities as stated above. She is due for labs. She denies any fevers or chills currently. She also has mouth lesions and burning on the top of her mouth    All questions were answered to patients satisfaction.     Past Medical History:   Diagnosis Date    Anal fissure     Anemia 10/6/2017    Anxiety and depression     Arthritis     Asthma     [facility]rial fibrillation ([facility]     Blood transfusion     long time no reaction    CHF (congestive heart failure) (HCC)     Diastolic    COPD (chronic obstructive pulmonary disease) (HCC)     Disc disorder     DM2 (diabetes mellitus, type 2) ([facility]     on insulin    Fall due to stumbling 10/6/2017    GERD (gastroesophageal reflux disease)     Hx of blood clots     Hyperlipidemia     Hypertension     Inappropriate sinus tachycardia     LVH (left ventricular hypertrophy) Medication Sig Dispense Refill    insulin lispro (HUMALOG) 100 UNIT/ML injection vial INJECT 30 UNITS INTO THE SKIN 3 TIMES DAILY (BEFORE MEALS) 10 mL 2    glucose monitoring kit (FREESTYLE) monitoring kit Use once. 1 kit 0    clindamycin (CLEOCIN) 300 MG capsule Take 1 capsule by mouth 3 times daily for 7 days 21 capsule 0    Magic Mouthwash (MIRACLE MOUTHWASH) Swish and spit 5 mLs 4 times daily as needed for Irritation 1 Bottle 0    traZODone (DESYREL) 50 MG tablet TAKE 1 TABLET BY MOUTH EVERY DAY AT BEDTIME AS NEEDED FOR SLEEP 30 tablet 5    bumetanide (BUMEX) 1 MG tablet TAKE 1 TABLET BY MOUTH TWICE A  tablet 1    ipratropium-albuterol (DUONEB) 0.5-2.5 (3) MG/3ML SOLN nebulizer solution USE 1 VIAL VIA NEBULIZER EVERY 4 HOURS AS NEEDED FOR SHORTNESS OF BREATH 360 mL 2    dilTIAZem (CARDIZEM CD) 240 MG extended release capsule TAKE 1 CAPSULE BY MOUTH TWICE A DAY 60 capsule 0    Insulin Pen Needle 30G X 8 MM MISC 1 each by Does not apply route daily 100 each 5    blood glucose monitor strips Test 4 times a day & as needed for symptoms of irregular blood glucose.  100 strip 0    vitamin D (ERGOCALCIFEROL) 1.25 MG (37214 UT) CAPS capsule TAKE 1 CAPSULE BY MOUTH TWICE WEEKLY 8 capsule 2    ferrous sulfate 325 (65 Fe) MG tablet TAKE 1 TABLET BY MOUTH EVERY DAY WITH BREAKFAST 30 tablet 0    montelukast (SINGULAIR) 10 MG tablet Take 1 tablet by mouth nightly 90 tablet 1    escitalopram (LEXAPRO) 20 MG tablet TAKE 1 TABLET BY MOUTH DAILY 90 tablet 1    apixaban (ELIQUIS) 5 MG TABS tablet TAKE 1 TABLET BY MOUTH TWICE A DAY 60 tablet 5    albuterol sulfate  (90 Base) MCG/ACT inhaler Inhale 2 puffs into the lungs 4 times daily as needed for Wheezing 1 Inhaler 5    blood glucose test strips (ASCENSIA AUTODISC VI;ONE TOUCH ULTRA TEST VI) strip Use 4 times daily 100 strip 5    spironolactone (ALDACTONE) 25 MG tablet Take 1 tablet by mouth daily 30 tablet 5    nystatin (MYCOSTATIN) 134724 UNIT/GM cream Apply topically 2 times daily. 1 Tube 0    lansoprazole (PREVACID) 30 MG delayed release capsule Take 1 capsule by mouth daily 90 capsule 1    Icosapent Ethyl (VASCEPA) 1 g CAPS capsule Take 2 capsules by mouth 2 times daily 60 capsule 3    ULTICARE ALCOHOL SWABS 70 % PADS USE 3 TIMES A DAY  5    BD INSULIN SYRINGE U/F 31G X 5/16\" 1 ML MISC USE AS DIRECTED  0    Insulin Pen Needle 31G X 8 MM MISC 1 each by Does not apply route daily 100 each 3    miconazole (MICOTIN) 2 % powder Apply topically 2 times daily. 45 g 1    metoprolol succinate (TOPROL XL) 200 MG extended release tablet Take 0.5 tablets by mouth 2 times daily 90 tablet 3    acetaminophen (TYLENOL) 325 MG tablet Take 650 mg by mouth every 6 hours as needed for Pain      glucose (GLUTOSE) 40 % GEL Take 37.5 mLs by mouth as needed (hypoglycemia) 45 g 0    insulin glargine (LANTUS) 100 UNIT/ML injection vial Inject 100 Units into the skin nightly 1 vial 3    OXYGEN Inhale 4 L into the lungs continuous       Elastic Bandages & Supports MISC 20-30 mmHg bilateral compression stockings  Size to fit  Dx:  Edema  Knee high 2 each 0    gabapentin (NEURONTIN) 100 MG capsule Take 100 mg by mouth 3 times daily.  gabapentin (NEURONTIN) 100 MG capsule Take 1 capsule by mouth 3 times daily for 30 days. 270 capsule 0    ibuprofen (ADVIL;MOTRIN) 800 MG tablet Take 1 tablet by mouth every 6 hours as needed for Pain 20 tablet 0     No current facility-administered medications for this visit.         Allergies   Allergen Reactions    Statins Other (See Comments)     Muscle pains       Health Maintenance Due   Topic Date Due    DTaP/Tdap/Td vaccine (1 - Tdap) 12/02/1972    DEXA (modify frequency per FRAX score)  12/02/2008    Shingles Vaccine (2 of 3) 02/27/2015    Diabetic retinal exam  04/27/2018    Breast cancer screen  11/04/2018    Diabetic foot exam  05/16/2019    Diabetic microalbuminuria test  05/16/2019    A1C test (Diabetic or Prediabetic)  04/23/2020           REVIEW OF SYSTEMS  Review of Systems   Constitutional: Positive for fatigue. Negative for chills and fever. HENT: Negative for congestion, postnasal drip, sinus pressure, sinus pain and sore throat. Mouth sores   Eyes: Negative for pain. Respiratory: Negative for cough and shortness of breath. Cardiovascular: Positive for leg swelling. Negative for chest pain. Gastrointestinal: Negative for abdominal pain, blood in stool, constipation, diarrhea, nausea and vomiting. Endocrine: Negative for cold intolerance and heat intolerance. Genitourinary: Negative for difficulty urinating, dysuria, frequency and menstrual problem. Musculoskeletal: Positive for arthralgias, back pain and neck pain. Negative for myalgias. Skin: Positive for wound. Negative for color change. Allergic/Immunologic: Positive for environmental allergies. Neurological: Negative for dizziness, weakness, light-headedness, numbness and headaches. Hematological: Negative for adenopathy. Psychiatric/Behavioral: Negative for behavioral problems, dysphoric mood and sleep disturbance. The patient is not nervous/anxious. PHYSICAL EXAM  /71   Pulse 98   Temp 97.4 °F (36.3 °C) (Temporal)   Resp 20   Ht 5' 3\" (1.6 m)   LMP  (LMP Unknown)   BMI 51.37 kg/m²   Physical Exam  Vitals signs and nursing note reviewed. Constitutional:       General: She is not in acute distress. Appearance: She is well-developed. She is obese. Comments: Uses walker but in wheelchair currnetly   HENT:      Head: Normocephalic and atraumatic. Right Ear: External ear normal.      Left Ear: External ear normal.      Nose: Nose normal.      Comments: Nasal cannula in place  Eyes:      General: No scleral icterus. Conjunctiva/sclera: Conjunctivae normal.   Neck:      Musculoskeletal: Neck supple. Thyroid: No thyromegaly.    Cardiovascular:      Rate and Rhythm: Normal rate and regular rhythm. Heart sounds: Murmur (RAMÓN) present. Pulmonary:      Effort: Pulmonary effort is normal. No respiratory distress. Breath sounds: No wheezing. Abdominal:      Palpations: Abdomen is soft. Tenderness: There is no abdominal tenderness. Genitourinary:     Vagina: Normal.   Musculoskeletal: Normal range of motion. Comments: Edema noted bilaterally up to the thigh   Lymphadenopathy:      Cervical: No cervical adenopathy. Skin:     General: Skin is warm and dry. Comments: Legs wrapped currently. Large erythematous right buttock wound with no drainage noted. Toes are erythematous and ulcers noted bilateral feet   Neurological:      Mental Status: She is alert and oriented to person, place, and time. Psychiatric:         Behavior: Behavior normal.         Thought Content: Thought content normal.         Judgment: Judgment normal.              Laboratory:   All laboratory and radiology results have been personally reviewed by myself    Lab Results   Component Value Date     02/26/2020    K 4.8 02/26/2020    K 5.4 01/19/2020    CL 83 02/26/2020    CO2 28 02/26/2020    BUN 25 02/26/2020    CREATININE 0.8 02/26/2020    PROT 6.0 12/26/2019    LABALBU 3.3 12/26/2019    LABALBU 4.5 11/02/2011    CALCIUM 9.5 02/26/2020    GFRAA >60 02/26/2020    LABGLOM >60 02/26/2020    GLUCOSE 498 02/26/2020    GLUCOSE 181 12/16/2011    AST 11 12/26/2019    ALT 9 12/26/2019    ALKPHOS 83 12/26/2019    BILITOT <0.2 12/26/2019    TSH 1.740 11/07/2019    VITD25 9 11/07/2019    CHOL 318 11/07/2019    TRIG 861 11/07/2019    HDL 28 11/07/2019    LDLCALC - 11/07/2019    LABA1C 11.7 01/23/2020        Lab Results   Component Value Date    CHOL 318 (H) 11/07/2019    CHOL 177 05/12/2017    CHOL 208 (H) 08/15/2016     Lab Results   Component Value Date    TRIG 861 (H) 11/07/2019    TRIG 285 (H) 05/12/2017    TRIG 336 (H) 08/15/2016     Lab Results   Component Value Date    HDL 28 11/07/2019    HDL 34 05/16/2018

## 2020-05-27 LAB
HBA1C MFR BLD: 17.1 % (ref 4–5.6)
VITAMIN D 25-HYDROXY: 17 NG/ML (ref 30–100)

## 2020-05-27 RX ORDER — INSULIN GLARGINE 100 [IU]/ML
40 INJECTION, SOLUTION SUBCUTANEOUS 2 TIMES DAILY
Qty: 10 VIAL | Refills: 3 | Status: ON HOLD
Start: 2020-05-27 | End: 2020-06-01 | Stop reason: HOSPADM

## 2020-05-28 ENCOUNTER — APPOINTMENT (OUTPATIENT)
Dept: ULTRASOUND IMAGING | Age: 65
DRG: 364 | End: 2020-05-28
Payer: COMMERCIAL

## 2020-05-28 ENCOUNTER — TELEPHONE (OUTPATIENT)
Dept: FAMILY MEDICINE CLINIC | Age: 65
End: 2020-05-28

## 2020-05-28 ENCOUNTER — APPOINTMENT (OUTPATIENT)
Dept: GENERAL RADIOLOGY | Age: 65
DRG: 364 | End: 2020-05-28
Payer: COMMERCIAL

## 2020-05-28 ENCOUNTER — HOSPITAL ENCOUNTER (INPATIENT)
Age: 65
LOS: 4 days | Discharge: HOME | DRG: 364 | End: 2020-06-02
Attending: EMERGENCY MEDICINE | Admitting: FAMILY MEDICINE
Payer: COMMERCIAL

## 2020-05-28 LAB
BACTERIA: ABNORMAL /HPF
BASOPHILS ABSOLUTE: 0.04 E9/L (ref 0–0.2)
BASOPHILS RELATIVE PERCENT: 0.5 % (ref 0–2)
BILIRUBIN URINE: NEGATIVE
BLOOD, URINE: NEGATIVE
CLARITY: CLEAR
COLOR: YELLOW
EOSINOPHILS ABSOLUTE: 0.09 E9/L (ref 0.05–0.5)
EOSINOPHILS RELATIVE PERCENT: 1.1 % (ref 0–6)
GLUCOSE URINE: >=1000 MG/DL
HCT VFR BLD CALC: 28.7 % (ref 34–48)
HEMOGLOBIN: 8.6 G/DL (ref 11.5–15.5)
IMMATURE GRANULOCYTES #: 0.04 E9/L
IMMATURE GRANULOCYTES %: 0.5 % (ref 0–5)
KETONES, URINE: NEGATIVE MG/DL
LACTIC ACID: 2.8 MMOL/L (ref 0.5–2.2)
LEUKOCYTE ESTERASE, URINE: ABNORMAL
LYMPHOCYTES ABSOLUTE: 1.15 E9/L (ref 1.5–4)
LYMPHOCYTES RELATIVE PERCENT: 13.6 % (ref 20–42)
MCH RBC QN AUTO: 24.8 PG (ref 26–35)
MCHC RBC AUTO-ENTMCNC: 30 % (ref 32–34.5)
MCV RBC AUTO: 82.7 FL (ref 80–99.9)
MONOCYTES ABSOLUTE: 0.52 E9/L (ref 0.1–0.95)
MONOCYTES RELATIVE PERCENT: 6.2 % (ref 2–12)
NEUTROPHILS ABSOLUTE: 6.6 E9/L (ref 1.8–7.3)
NEUTROPHILS RELATIVE PERCENT: 78.1 % (ref 43–80)
NITRITE, URINE: NEGATIVE
PDW BLD-RTO: 14.6 FL (ref 11.5–15)
PH UA: 5.5 (ref 5–9)
PLATELET # BLD: 327 E9/L (ref 130–450)
PMV BLD AUTO: 9.9 FL (ref 7–12)
PROTEIN UA: NEGATIVE MG/DL
RBC # BLD: 3.47 E12/L (ref 3.5–5.5)
RBC UA: ABNORMAL /HPF (ref 0–2)
SPECIFIC GRAVITY UA: <=1.005 (ref 1–1.03)
UROBILINOGEN, URINE: 0.2 E.U./DL
WBC # BLD: 8.4 E9/L (ref 4.5–11.5)
WBC UA: ABNORMAL /HPF (ref 0–5)
YEAST: PRESENT /HPF

## 2020-05-28 PROCEDURE — 85025 COMPLETE CBC W/AUTO DIFF WBC: CPT

## 2020-05-28 PROCEDURE — 71045 X-RAY EXAM CHEST 1 VIEW: CPT

## 2020-05-28 PROCEDURE — 6370000000 HC RX 637 (ALT 250 FOR IP): Performed by: NURSE PRACTITIONER

## 2020-05-28 PROCEDURE — 83735 ASSAY OF MAGNESIUM: CPT

## 2020-05-28 PROCEDURE — 10060 I&D ABSCESS SIMPLE/SINGLE: CPT

## 2020-05-28 PROCEDURE — 6360000002 HC RX W HCPCS: Performed by: NURSE PRACTITIONER

## 2020-05-28 PROCEDURE — 84484 ASSAY OF TROPONIN QUANT: CPT

## 2020-05-28 PROCEDURE — 93970 EXTREMITY STUDY: CPT

## 2020-05-28 PROCEDURE — 87040 BLOOD CULTURE FOR BACTERIA: CPT

## 2020-05-28 PROCEDURE — 82010 KETONE BODYS QUAN: CPT

## 2020-05-28 PROCEDURE — 80053 COMPREHEN METABOLIC PANEL: CPT

## 2020-05-28 PROCEDURE — 83605 ASSAY OF LACTIC ACID: CPT

## 2020-05-28 PROCEDURE — 81001 URINALYSIS AUTO W/SCOPE: CPT

## 2020-05-28 PROCEDURE — 99285 EMERGENCY DEPT VISIT HI MDM: CPT

## 2020-05-28 PROCEDURE — 96375 TX/PRO/DX INJ NEW DRUG ADDON: CPT

## 2020-05-28 PROCEDURE — 93005 ELECTROCARDIOGRAM TRACING: CPT | Performed by: NURSE PRACTITIONER

## 2020-05-28 RX ORDER — 0.9 % SODIUM CHLORIDE 0.9 %
1000 INTRAVENOUS SOLUTION INTRAVENOUS ONCE
Status: COMPLETED | OUTPATIENT
Start: 2020-05-28 | End: 2020-05-29

## 2020-05-28 RX ORDER — ONDANSETRON 2 MG/ML
4 INJECTION INTRAMUSCULAR; INTRAVENOUS ONCE
Status: COMPLETED | OUTPATIENT
Start: 2020-05-28 | End: 2020-05-28

## 2020-05-28 RX ORDER — MORPHINE SULFATE 4 MG/ML
4 INJECTION, SOLUTION INTRAMUSCULAR; INTRAVENOUS ONCE
Status: COMPLETED | OUTPATIENT
Start: 2020-05-28 | End: 2020-05-28

## 2020-05-28 RX ORDER — NYSTATIN 100000 U/G
CREAM TOPICAL 2 TIMES DAILY
Status: DISCONTINUED | OUTPATIENT
Start: 2020-05-28 | End: 2020-05-29

## 2020-05-28 RX ORDER — GABAPENTIN 300 MG/1
300 CAPSULE ORAL 3 TIMES DAILY
Qty: 270 CAPSULE | Refills: 0 | Status: SHIPPED
Start: 2020-05-28 | End: 2020-08-27 | Stop reason: SDUPTHER

## 2020-05-28 RX ADMIN — NYSTATIN: 100000 CREAM TOPICAL at 23:53

## 2020-05-28 RX ADMIN — MORPHINE SULFATE 4 MG: 4 INJECTION, SOLUTION INTRAMUSCULAR; INTRAVENOUS at 22:39

## 2020-05-28 RX ADMIN — ONDANSETRON 4 MG: 2 INJECTION INTRAMUSCULAR; INTRAVENOUS at 22:39

## 2020-05-28 ASSESSMENT — PAIN SCALES - GENERAL: PAINLEVEL_OUTOF10: 9

## 2020-05-28 NOTE — TELEPHONE ENCOUNTER
Pt called back left voice message, she got a new gluco meter and her sugar read this morning was 335

## 2020-05-28 NOTE — TELEPHONE ENCOUNTER
100 mg and no she said still does not have lantus she said she has to find a way to get the medication from pharm

## 2020-05-28 NOTE — TELEPHONE ENCOUNTER
Pt called back she is having itching in her private area, wants Dr to call something in for her please

## 2020-05-28 NOTE — TELEPHONE ENCOUNTER
Takes 100 mg TID on gabapentin, and she has not started the Lantus, should be delivered to her tomorrow.

## 2020-05-28 NOTE — TELEPHONE ENCOUNTER
[facility] called they received an order for the pt and have some questions please call Coleman at [phone]

## 2020-05-29 ENCOUNTER — APPOINTMENT (OUTPATIENT)
Dept: CT IMAGING | Age: 65
DRG: 364 | End: 2020-05-29
Payer: COMMERCIAL

## 2020-05-29 PROBLEM — L02.31 ABSCESS AND CELLULITIS OF GLUTEAL REGION: Status: ACTIVE | Noted: 2020-05-29

## 2020-05-29 PROBLEM — L03.317 ABSCESS AND CELLULITIS OF GLUTEAL REGION: Status: ACTIVE | Noted: 2020-05-29

## 2020-05-29 LAB
ALBUMIN SERPL-MCNC: 3.4 G/DL (ref 3.5–5.2)
ALP BLD-CCNC: 109 U/L (ref 35–104)
ALT SERPL-CCNC: 6 U/L (ref 0–32)
ANION GAP SERPL CALCULATED.3IONS-SCNC: 13 MMOL/L (ref 7–16)
ANION GAP SERPL CALCULATED.3IONS-SCNC: 14 MMOL/L (ref 7–16)
AST SERPL-CCNC: 8 U/L (ref 0–31)
BASOPHILS ABSOLUTE: 0.05 E9/L (ref 0–0.2)
BASOPHILS RELATIVE PERCENT: 0.6 % (ref 0–2)
BETA-HYDROXYBUTYRATE: 0.04 MMOL/L (ref 0.02–0.27)
BETA-HYDROXYBUTYRATE: 0.18 MMOL/L (ref 0.02–0.27)
BILIRUB SERPL-MCNC: <0.2 MG/DL (ref 0–1.2)
BUN BLDV-MCNC: 13 MG/DL (ref 8–23)
BUN BLDV-MCNC: 17 MG/DL (ref 8–23)
C-REACTIVE PROTEIN: 14 MG/DL (ref 0–0.4)
CALCIUM SERPL-MCNC: 8.3 MG/DL (ref 8.6–10.2)
CALCIUM SERPL-MCNC: 8.8 MG/DL (ref 8.6–10.2)
CHLORIDE BLD-SCNC: 94 MMOL/L (ref 98–107)
CHLORIDE BLD-SCNC: 96 MMOL/L (ref 98–107)
CO2: 23 MMOL/L (ref 22–29)
CO2: 26 MMOL/L (ref 22–29)
CORTISOL TOTAL: 4.92 MCG/DL (ref 2.68–18.4)
CREAT SERPL-MCNC: 0.6 MG/DL (ref 0.5–1)
CREAT SERPL-MCNC: 0.8 MG/DL (ref 0.5–1)
EKG ATRIAL RATE: 103 BPM
EKG P AXIS: 61 DEGREES
EKG P-R INTERVAL: 140 MS
EKG Q-T INTERVAL: 372 MS
EKG QRS DURATION: 110 MS
EKG QTC CALCULATION (BAZETT): 487 MS
EKG R AXIS: 65 DEGREES
EKG T AXIS: 47 DEGREES
EKG VENTRICULAR RATE: 103 BPM
EOSINOPHILS ABSOLUTE: 0.12 E9/L (ref 0.05–0.5)
EOSINOPHILS RELATIVE PERCENT: 1.6 % (ref 0–6)
GFR AFRICAN AMERICAN: >60
GFR AFRICAN AMERICAN: >60
GFR NON-AFRICAN AMERICAN: >60 ML/MIN/1.73
GFR NON-AFRICAN AMERICAN: >60 ML/MIN/1.73
GLUCOSE BLD-MCNC: 392 MG/DL (ref 74–99)
GLUCOSE BLD-MCNC: 506 MG/DL (ref 74–99)
HCT VFR BLD CALC: 26.8 % (ref 34–48)
HEMOGLOBIN: 8.3 G/DL (ref 11.5–15.5)
IMMATURE GRANULOCYTES #: 0.07 E9/L
IMMATURE GRANULOCYTES %: 0.9 % (ref 0–5)
LACTIC ACID: 1.4 MMOL/L (ref 0.5–2.2)
LYMPHOCYTES ABSOLUTE: 1.26 E9/L (ref 1.5–4)
LYMPHOCYTES RELATIVE PERCENT: 16.3 % (ref 20–42)
MAGNESIUM: 1.7 MG/DL (ref 1.6–2.6)
MCH RBC QN AUTO: 25.8 PG (ref 26–35)
MCHC RBC AUTO-ENTMCNC: 31 % (ref 32–34.5)
MCV RBC AUTO: 83.2 FL (ref 80–99.9)
METER GLUCOSE: 290 MG/DL (ref 74–99)
METER GLUCOSE: 331 MG/DL (ref 74–99)
METER GLUCOSE: 382 MG/DL (ref 74–99)
METER GLUCOSE: 383 MG/DL (ref 74–99)
METER GLUCOSE: 396 MG/DL (ref 74–99)
METER GLUCOSE: 408 MG/DL (ref 74–99)
MONOCYTES ABSOLUTE: 0.52 E9/L (ref 0.1–0.95)
MONOCYTES RELATIVE PERCENT: 6.7 % (ref 2–12)
NEUTROPHILS ABSOLUTE: 5.72 E9/L (ref 1.8–7.3)
NEUTROPHILS RELATIVE PERCENT: 73.9 % (ref 43–80)
PDW BLD-RTO: 14.6 FL (ref 11.5–15)
PH VENOUS: 7.32 (ref 7.35–7.45)
PLATELET # BLD: 278 E9/L (ref 130–450)
PMV BLD AUTO: 9.7 FL (ref 7–12)
POTASSIUM SERPL-SCNC: 4.1 MMOL/L (ref 3.5–5)
POTASSIUM SERPL-SCNC: 4.2 MMOL/L (ref 3.5–5)
RBC # BLD: 3.22 E12/L (ref 3.5–5.5)
SARS-COV-2, NAAT: NOT DETECTED
SEDIMENTATION RATE, ERYTHROCYTE: 75 MM/HR (ref 0–20)
SODIUM BLD-SCNC: 133 MMOL/L (ref 132–146)
SODIUM BLD-SCNC: 133 MMOL/L (ref 132–146)
TOTAL PROTEIN: 7.1 G/DL (ref 6.4–8.3)
TROPONIN: <0.01 NG/ML (ref 0–0.03)
WBC # BLD: 7.7 E9/L (ref 4.5–11.5)

## 2020-05-29 PROCEDURE — 6370000000 HC RX 637 (ALT 250 FOR IP): Performed by: NURSE PRACTITIONER

## 2020-05-29 PROCEDURE — 74177 CT ABD & PELVIS W/CONTRAST: CPT

## 2020-05-29 PROCEDURE — 36415 COLL VENOUS BLD VENIPUNCTURE: CPT

## 2020-05-29 PROCEDURE — 6370000000 HC RX 637 (ALT 250 FOR IP): Performed by: INTERNAL MEDICINE

## 2020-05-29 PROCEDURE — 2500000003 HC RX 250 WO HCPCS: Performed by: FAMILY MEDICINE

## 2020-05-29 PROCEDURE — 85651 RBC SED RATE NONAUTOMATED: CPT

## 2020-05-29 PROCEDURE — 6360000002 HC RX W HCPCS: Performed by: INTERNAL MEDICINE

## 2020-05-29 PROCEDURE — 82962 GLUCOSE BLOOD TEST: CPT

## 2020-05-29 PROCEDURE — 2580000003 HC RX 258: Performed by: FAMILY MEDICINE

## 2020-05-29 PROCEDURE — 80048 BASIC METABOLIC PNL TOTAL CA: CPT

## 2020-05-29 PROCEDURE — 6360000002 HC RX W HCPCS: Performed by: NURSE PRACTITIONER

## 2020-05-29 PROCEDURE — 83605 ASSAY OF LACTIC ACID: CPT

## 2020-05-29 PROCEDURE — 85025 COMPLETE CBC W/AUTO DIFF WBC: CPT

## 2020-05-29 PROCEDURE — 99255 IP/OBS CONSLTJ NEW/EST HI 80: CPT | Performed by: INTERNAL MEDICINE

## 2020-05-29 PROCEDURE — 2580000003 HC RX 258: Performed by: NURSE PRACTITIONER

## 2020-05-29 PROCEDURE — 96376 TX/PRO/DX INJ SAME DRUG ADON: CPT

## 2020-05-29 PROCEDURE — 6360000002 HC RX W HCPCS

## 2020-05-29 PROCEDURE — U0002 COVID-19 LAB TEST NON-CDC: HCPCS

## 2020-05-29 PROCEDURE — 74170 CT ABD WO CNTRST FLWD CNTRST: CPT

## 2020-05-29 PROCEDURE — 86140 C-REACTIVE PROTEIN: CPT

## 2020-05-29 PROCEDURE — 82010 KETONE BODYS QUAN: CPT

## 2020-05-29 PROCEDURE — 2580000003 HC RX 258: Performed by: RADIOLOGY

## 2020-05-29 PROCEDURE — 96375 TX/PRO/DX INJ NEW DRUG ADDON: CPT

## 2020-05-29 PROCEDURE — 82800 BLOOD PH: CPT

## 2020-05-29 PROCEDURE — 6370000000 HC RX 637 (ALT 250 FOR IP): Performed by: FAMILY MEDICINE

## 2020-05-29 PROCEDURE — 93010 ELECTROCARDIOGRAM REPORT: CPT | Performed by: INTERNAL MEDICINE

## 2020-05-29 PROCEDURE — 96365 THER/PROPH/DIAG IV INF INIT: CPT

## 2020-05-29 PROCEDURE — 6360000002 HC RX W HCPCS: Performed by: EMERGENCY MEDICINE

## 2020-05-29 PROCEDURE — 2060000000 HC ICU INTERMEDIATE R&B

## 2020-05-29 PROCEDURE — 6360000002 HC RX W HCPCS: Performed by: FAMILY MEDICINE

## 2020-05-29 PROCEDURE — 2500000003 HC RX 250 WO HCPCS: Performed by: NURSE PRACTITIONER

## 2020-05-29 PROCEDURE — 6360000004 HC RX CONTRAST MEDICATION: Performed by: RADIOLOGY

## 2020-05-29 PROCEDURE — 82533 TOTAL CORTISOL: CPT

## 2020-05-29 RX ORDER — SODIUM CHLORIDE 0.9 % (FLUSH) 0.9 %
10 SYRINGE (ML) INJECTION ONCE
Status: COMPLETED | OUTPATIENT
Start: 2020-05-29 | End: 2020-05-29

## 2020-05-29 RX ORDER — NICOTINE POLACRILEX 4 MG
15 LOZENGE BUCCAL PRN
Status: DISCONTINUED | OUTPATIENT
Start: 2020-05-29 | End: 2020-06-02 | Stop reason: HOSPADM

## 2020-05-29 RX ORDER — DIPHENHYDRAMINE HYDROCHLORIDE 50 MG/ML
25 INJECTION INTRAMUSCULAR; INTRAVENOUS ONCE
Status: COMPLETED | OUTPATIENT
Start: 2020-05-29 | End: 2020-05-29

## 2020-05-29 RX ORDER — INSULIN GLARGINE 100 [IU]/ML
50 INJECTION, SOLUTION SUBCUTANEOUS 2 TIMES DAILY
Status: DISCONTINUED | OUTPATIENT
Start: 2020-05-29 | End: 2020-05-30

## 2020-05-29 RX ORDER — MONTELUKAST SODIUM 10 MG/1
10 TABLET ORAL NIGHTLY
Status: DISCONTINUED | OUTPATIENT
Start: 2020-05-29 | End: 2020-06-02 | Stop reason: HOSPADM

## 2020-05-29 RX ORDER — SPIRONOLACTONE 25 MG/1
25 TABLET ORAL DAILY
Status: DISCONTINUED | OUTPATIENT
Start: 2020-05-29 | End: 2020-06-02 | Stop reason: HOSPADM

## 2020-05-29 RX ORDER — GABAPENTIN 300 MG/1
300 CAPSULE ORAL 3 TIMES DAILY
Status: DISCONTINUED | OUTPATIENT
Start: 2020-05-29 | End: 2020-06-02 | Stop reason: HOSPADM

## 2020-05-29 RX ORDER — DEXTROSE MONOHYDRATE 50 MG/ML
100 INJECTION, SOLUTION INTRAVENOUS PRN
Status: DISCONTINUED | OUTPATIENT
Start: 2020-05-29 | End: 2020-06-02 | Stop reason: HOSPADM

## 2020-05-29 RX ORDER — INSULIN GLARGINE 100 [IU]/ML
40 INJECTION, SOLUTION SUBCUTANEOUS 2 TIMES DAILY
Status: DISCONTINUED | OUTPATIENT
Start: 2020-05-29 | End: 2020-05-29

## 2020-05-29 RX ORDER — OXYCODONE HYDROCHLORIDE AND ACETAMINOPHEN 5; 325 MG/1; MG/1
1 TABLET ORAL EVERY 6 HOURS PRN
Status: DISCONTINUED | OUTPATIENT
Start: 2020-05-29 | End: 2020-06-02 | Stop reason: HOSPADM

## 2020-05-29 RX ORDER — MORPHINE SULFATE 4 MG/ML
INJECTION, SOLUTION INTRAMUSCULAR; INTRAVENOUS
Status: COMPLETED
Start: 2020-05-29 | End: 2020-05-29

## 2020-05-29 RX ORDER — PANTOPRAZOLE SODIUM 40 MG/1
40 TABLET, DELAYED RELEASE ORAL
Status: DISCONTINUED | OUTPATIENT
Start: 2020-05-29 | End: 2020-06-02 | Stop reason: HOSPADM

## 2020-05-29 RX ORDER — METOPROLOL SUCCINATE 100 MG/1
100 TABLET, EXTENDED RELEASE ORAL 2 TIMES DAILY
Status: DISCONTINUED | OUTPATIENT
Start: 2020-05-29 | End: 2020-06-02 | Stop reason: HOSPADM

## 2020-05-29 RX ORDER — ESCITALOPRAM OXALATE 10 MG/1
20 TABLET ORAL DAILY
Status: DISCONTINUED | OUTPATIENT
Start: 2020-05-29 | End: 2020-06-02 | Stop reason: HOSPADM

## 2020-05-29 RX ORDER — OMEGA-3-ACID ETHYL ESTERS 1 G/1
2 CAPSULE, LIQUID FILLED ORAL 2 TIMES DAILY
Status: DISCONTINUED | OUTPATIENT
Start: 2020-05-29 | End: 2020-06-02 | Stop reason: HOSPADM

## 2020-05-29 RX ORDER — BUMETANIDE 1 MG/1
1 TABLET ORAL 2 TIMES DAILY
Status: DISCONTINUED | OUTPATIENT
Start: 2020-05-29 | End: 2020-06-02 | Stop reason: HOSPADM

## 2020-05-29 RX ORDER — DIPHENHYDRAMINE HCL 25 MG
25 TABLET ORAL EVERY 6 HOURS PRN
Status: DISCONTINUED | OUTPATIENT
Start: 2020-05-29 | End: 2020-06-02 | Stop reason: HOSPADM

## 2020-05-29 RX ORDER — DEXTROSE MONOHYDRATE 25 G/50ML
12.5 INJECTION, SOLUTION INTRAVENOUS PRN
Status: DISCONTINUED | OUTPATIENT
Start: 2020-05-29 | End: 2020-06-02 | Stop reason: HOSPADM

## 2020-05-29 RX ORDER — TRAZODONE HYDROCHLORIDE 50 MG/1
50 TABLET ORAL NIGHTLY PRN
Status: DISCONTINUED | OUTPATIENT
Start: 2020-05-29 | End: 2020-06-02 | Stop reason: HOSPADM

## 2020-05-29 RX ORDER — OXYCODONE HYDROCHLORIDE AND ACETAMINOPHEN 5; 325 MG/1; MG/1
1 TABLET ORAL ONCE
Status: COMPLETED | OUTPATIENT
Start: 2020-05-29 | End: 2020-05-29

## 2020-05-29 RX ORDER — DEXAMETHASONE 1 MG
1 TABLET ORAL ONCE
Status: COMPLETED | OUTPATIENT
Start: 2020-05-29 | End: 2020-05-29

## 2020-05-29 RX ORDER — ERGOCALCIFEROL 1.25 MG/1
50000 CAPSULE ORAL WEEKLY
Status: DISCONTINUED | OUTPATIENT
Start: 2020-05-29 | End: 2020-06-02 | Stop reason: HOSPADM

## 2020-05-29 RX ORDER — 0.9 % SODIUM CHLORIDE 0.9 %
1000 INTRAVENOUS SOLUTION INTRAVENOUS ONCE
Status: COMPLETED | OUTPATIENT
Start: 2020-05-29 | End: 2020-05-29

## 2020-05-29 RX ORDER — FERROUS SULFATE 325(65) MG
325 TABLET ORAL
Status: DISCONTINUED | OUTPATIENT
Start: 2020-05-29 | End: 2020-06-02 | Stop reason: HOSPADM

## 2020-05-29 RX ORDER — ACETAMINOPHEN 325 MG/1
650 TABLET ORAL EVERY 6 HOURS PRN
Status: DISCONTINUED | OUTPATIENT
Start: 2020-05-29 | End: 2020-06-02 | Stop reason: HOSPADM

## 2020-05-29 RX ORDER — ICOSAPENT ETHYL 1000 MG/1
2 CAPSULE ORAL 2 TIMES DAILY
Status: DISCONTINUED | OUTPATIENT
Start: 2020-05-29 | End: 2020-05-29 | Stop reason: CLARIF

## 2020-05-29 RX ORDER — ALBUTEROL SULFATE 2.5 MG/3ML
2.5 SOLUTION RESPIRATORY (INHALATION) EVERY 4 HOURS PRN
Status: DISCONTINUED | OUTPATIENT
Start: 2020-05-29 | End: 2020-06-02 | Stop reason: HOSPADM

## 2020-05-29 RX ORDER — FLUCONAZOLE 100 MG/1
200 TABLET ORAL ONCE
Status: COMPLETED | OUTPATIENT
Start: 2020-05-29 | End: 2020-05-29

## 2020-05-29 RX ORDER — DILTIAZEM HYDROCHLORIDE 240 MG/1
240 CAPSULE, COATED, EXTENDED RELEASE ORAL DAILY
Status: DISCONTINUED | OUTPATIENT
Start: 2020-05-29 | End: 2020-06-02 | Stop reason: HOSPADM

## 2020-05-29 RX ADMIN — INSULIN LISPRO 40 UNITS: 100 INJECTION, SOLUTION INTRAVENOUS; SUBCUTANEOUS at 12:13

## 2020-05-29 RX ADMIN — METOPROLOL SUCCINATE 100 MG: 100 TABLET, EXTENDED RELEASE ORAL at 20:26

## 2020-05-29 RX ADMIN — PIPERACILLIN AND TAZOBACTAM 3.38 G: 3; .375 INJECTION, POWDER, FOR SOLUTION INTRAVENOUS at 17:57

## 2020-05-29 RX ADMIN — OMEGA-3-ACID ETHYL ESTERS 2 G: 1 CAPSULE, LIQUID FILLED ORAL at 20:22

## 2020-05-29 RX ADMIN — OXYCODONE AND ACETAMINOPHEN 1 TABLET: 5; 325 TABLET ORAL at 12:11

## 2020-05-29 RX ADMIN — APIXABAN 5 MG: 5 TABLET, FILM COATED ORAL at 18:25

## 2020-05-29 RX ADMIN — IOPAMIDOL 110 ML: 755 INJECTION, SOLUTION INTRAVENOUS at 00:29

## 2020-05-29 RX ADMIN — MORPHINE SULFATE 4 MG: 4 INJECTION, SOLUTION INTRAMUSCULAR; INTRAVENOUS at 01:59

## 2020-05-29 RX ADMIN — INSULIN LISPRO 15 UNITS: 100 INJECTION, SOLUTION INTRAVENOUS; SUBCUTANEOUS at 12:11

## 2020-05-29 RX ADMIN — SODIUM CHLORIDE 1000 ML: 9 INJECTION, SOLUTION INTRAVENOUS at 03:02

## 2020-05-29 RX ADMIN — DIPHENHYDRAMINE HYDROCHLORIDE 25 MG: 50 INJECTION, SOLUTION INTRAMUSCULAR; INTRAVENOUS at 04:47

## 2020-05-29 RX ADMIN — SODIUM CHLORIDE 1000 ML: 9 INJECTION, SOLUTION INTRAVENOUS at 02:00

## 2020-05-29 RX ADMIN — INSULIN LISPRO 10 UNITS: 100 INJECTION, SOLUTION INTRAVENOUS; SUBCUTANEOUS at 08:51

## 2020-05-29 RX ADMIN — DILTIAZEM HYDROCHLORIDE 240 MG: 240 CAPSULE, EXTENDED RELEASE ORAL at 08:47

## 2020-05-29 RX ADMIN — INSULIN LISPRO 30 UNITS: 100 INJECTION, SOLUTION INTRAVENOUS; SUBCUTANEOUS at 08:48

## 2020-05-29 RX ADMIN — MICONAZOLE NITRATE: 20.6 POWDER TOPICAL at 20:19

## 2020-05-29 RX ADMIN — OXYCODONE HYDROCHLORIDE AND ACETAMINOPHEN 1 TABLET: 5; 325 TABLET ORAL at 03:23

## 2020-05-29 RX ADMIN — ERGOCALCIFEROL 50000 UNITS: 1.25 CAPSULE ORAL at 12:30

## 2020-05-29 RX ADMIN — Medication 1.5 G: at 04:08

## 2020-05-29 RX ADMIN — SODIUM CHLORIDE, PRESERVATIVE FREE 10 ML: 5 INJECTION INTRAVENOUS at 20:25

## 2020-05-29 RX ADMIN — PANTOPRAZOLE SODIUM 40 MG: 40 TABLET, DELAYED RELEASE ORAL at 08:47

## 2020-05-29 RX ADMIN — ACETAMINOPHEN 650 MG: 325 TABLET, FILM COATED ORAL at 20:19

## 2020-05-29 RX ADMIN — GABAPENTIN 300 MG: 300 CAPSULE ORAL at 20:22

## 2020-05-29 RX ADMIN — INSULIN GLARGINE 50 UNITS: 100 INJECTION, SOLUTION SUBCUTANEOUS at 20:19

## 2020-05-29 RX ADMIN — INSULIN GLARGINE 40 UNITS: 100 INJECTION, SOLUTION SUBCUTANEOUS at 08:48

## 2020-05-29 RX ADMIN — GABAPENTIN 300 MG: 300 CAPSULE ORAL at 16:15

## 2020-05-29 RX ADMIN — CEFEPIME HYDROCHLORIDE 2 G: 2 INJECTION, POWDER, FOR SOLUTION INTRAVENOUS at 03:01

## 2020-05-29 RX ADMIN — OXYCODONE AND ACETAMINOPHEN 1 TABLET: 5; 325 TABLET ORAL at 18:25

## 2020-05-29 RX ADMIN — INSULIN HUMAN 5 UNITS: 100 INJECTION, SOLUTION PARENTERAL at 02:59

## 2020-05-29 RX ADMIN — MONTELUKAST SODIUM 10 MG: 10 TABLET, FILM COATED ORAL at 20:22

## 2020-05-29 RX ADMIN — VANCOMYCIN HYDROCHLORIDE 1250 MG: 10 INJECTION, POWDER, LYOPHILIZED, FOR SOLUTION INTRAVENOUS at 16:36

## 2020-05-29 RX ADMIN — DEXAMETHASONE 1 MG: 1 TABLET ORAL at 23:20

## 2020-05-29 RX ADMIN — MICONAZOLE NITRATE: 20.6 POWDER TOPICAL at 02:02

## 2020-05-29 RX ADMIN — PIPERACILLIN AND TAZOBACTAM 3.38 G: 3; .375 INJECTION, POWDER, FOR SOLUTION INTRAVENOUS at 10:45

## 2020-05-29 RX ADMIN — FERROUS SULFATE TAB 325 MG (65 MG ELEMENTAL FE) 325 MG: 325 (65 FE) TAB at 08:47

## 2020-05-29 RX ADMIN — INSULIN LISPRO 6 UNITS: 100 INJECTION, SOLUTION INTRAVENOUS; SUBCUTANEOUS at 20:21

## 2020-05-29 RX ADMIN — METOPROLOL SUCCINATE 100 MG: 100 TABLET, EXTENDED RELEASE ORAL at 08:47

## 2020-05-29 RX ADMIN — IOPAMIDOL 100 ML: 755 INJECTION, SOLUTION INTRAVENOUS at 19:23

## 2020-05-29 RX ADMIN — BUMETANIDE 1 MG: 1 TABLET ORAL at 20:22

## 2020-05-29 RX ADMIN — SODIUM CHLORIDE 25 ML: 9 INJECTION, SOLUTION INTRAVENOUS at 14:45

## 2020-05-29 RX ADMIN — MICONAZOLE NITRATE: 20.6 POWDER TOPICAL at 08:48

## 2020-05-29 RX ADMIN — ESCITALOPRAM 20 MG: 10 TABLET, FILM COATED ORAL at 12:30

## 2020-05-29 RX ADMIN — FLUCONAZOLE 200 MG: 100 TABLET ORAL at 01:57

## 2020-05-29 RX ADMIN — OMEGA-3-ACID ETHYL ESTERS 2 G: 1 CAPSULE, LIQUID FILLED ORAL at 12:30

## 2020-05-29 RX ADMIN — BUMETANIDE 1 MG: 1 TABLET ORAL at 08:47

## 2020-05-29 RX ADMIN — NYSTATIN: 100000 CREAM TOPICAL at 12:30

## 2020-05-29 RX ADMIN — SPIRONOLACTONE 25 MG: 25 TABLET ORAL at 08:48

## 2020-05-29 RX ADMIN — GABAPENTIN 300 MG: 300 CAPSULE ORAL at 08:47

## 2020-05-29 ASSESSMENT — PAIN DESCRIPTION - FREQUENCY
FREQUENCY: CONTINUOUS
FREQUENCY: INTERMITTENT

## 2020-05-29 ASSESSMENT — PAIN DESCRIPTION - PROGRESSION
CLINICAL_PROGRESSION: NOT CHANGED
CLINICAL_PROGRESSION: NOT CHANGED

## 2020-05-29 ASSESSMENT — PAIN SCALES - GENERAL
PAINLEVEL_OUTOF10: 7
PAINLEVEL_OUTOF10: 5
PAINLEVEL_OUTOF10: 10
PAINLEVEL_OUTOF10: 0
PAINLEVEL_OUTOF10: 0
PAINLEVEL_OUTOF10: 7
PAINLEVEL_OUTOF10: 8
PAINLEVEL_OUTOF10: 3
PAINLEVEL_OUTOF10: 7
PAINLEVEL_OUTOF10: 8

## 2020-05-29 ASSESSMENT — PAIN DESCRIPTION - ORIENTATION
ORIENTATION: MID
ORIENTATION: MID

## 2020-05-29 ASSESSMENT — PAIN DESCRIPTION - PAIN TYPE
TYPE: CHRONIC PAIN
TYPE: CHRONIC PAIN

## 2020-05-29 ASSESSMENT — PAIN DESCRIPTION - LOCATION
LOCATION: COCCYX
LOCATION: COCCYX

## 2020-05-29 ASSESSMENT — PAIN DESCRIPTION - DESCRIPTORS
DESCRIPTORS: CONSTANT;CRUSHING
DESCRIPTORS: CONSTANT;CRUSHING;DISCOMFORT

## 2020-05-29 ASSESSMENT — PAIN DESCRIPTION - ONSET
ONSET: ON-GOING
ONSET: ON-GOING

## 2020-05-29 NOTE — CONSULTS
ENDOCRINOLOGY INITIAL CONSULTATION NOTE VIA TELEMEDICINE SERVICE       Date of admission: 5/28/2020  Date of service: 5/29/2020  Admitting physician: Ori Perdomo MD   Primary Care Physician: Jayden Sanchez DO  Consultant physician: Cynthia Ovalles MD       Reason for the consultation:  Uncontrolled DM    History of Present Illness:  Services were provided through a video synchronous discussion     Delaney Romano is a 75 y.o. old female with extensive PMH including DM type 2 on insulin, chronic right buttock wound , COPD, HTN, morbid obesity, and others listed belo admitted to [facility] on 5/28/2020 because of chronic coccyx wound with increasing pain and infection, endocrine team was consulted for diabetes management and evaluation of adrenal mass    Regarding DM:   Prior to admission  The patient was diagnosed with type 2 DM at the age 38. Prior to admission patient was on Lantus 110 units daily at bedtime, Humalog 30 units with meals + ss. Patient has had no hypoglycemic episodes. Patient has not been eating consistent carbohydrate meals, self-blood glucose monitoring has been above goal prior to admission. In addition, the patient reported h/o neuropathy but denied retinopathy or nephropathy   A1c was markedly elevated   Lab Results   Component Value Date    LABA1C 17.1 05/26/2020     After admission   While hospitalized, anti-diabetic regiment includes Lantus 40 units BID + Humalog 30 units with meals + medium ss. Point of care glucose monitoring was reviewed and has been above goal. Appetite is good. Inpatient diet:   Carb Restricted diet     Point of care glucose monitoring (Independently reviewed)   Recent Labs     05/29/20  0415 05/29/20  0550 05/29/20  0845 05/29/20  1057   GLUMET 382* 408* 396* 383*     Rt adrenal mass:  CT scan 5/29/2020: there is further increase om size of right adrenal gland since the previous examination presently measures 3.4 x 3 cm previously was 2.6 x 1.6 cm.  The left adrenal gland is not enlarged    The patient reported h/o DM, HTN and obesity but denied h/o intermittent headache, sweating, or palpitation. Denied history of easy bruising, muscle weakness, osteoporosis/fracture, acne, striae, or hyperpigmentation       Past medical history:   Past Medical History:   Diagnosis Date    Anal fissure     Anemia 10/6/2017    Anxiety and depression     Arthritis     Asthma     [facility]     Blood transfusion     long time no reaction    [facility]estive [facility] failure) [facility]     Diastolic    COPD (chronic obstructive pulmonary disease) [facility]     Disc disorder     DM[facility]di[facility] mellitus[facility]     on insulin    Fall due to stumbling 10/6/2017    GERD (gastroesophageal reflux disease)     Hx of blood clots     Hyperlipidemia     Hypertension     Inappropriate sinus tachycardia     LVH (left ventricular hypertrophy)     moderate    Lymphadenitis, chronic 4/13/2018    Occipital neuralgia 11/2/2011    Respiratory acidosis 10/7/2017    Sinus tachycardia 2/16/2015       Past surgical history:  Past Surgical History:   Procedure Laterality Date    ANOSCOPY  10/25/2006    injection of anal sphincter with Botox, Franklyn Ortiz/Timmy, [facility]    ANOSCOPY  4/9/2007    injection of anal sphincter with Botox, Dr. Reid, [address] [place] [address] ANOSCOPY  6/13/2007    injection of anal sphincter with Botox, Franklyn Sanchez [facility]    ANUS SURGERY  4/4/2006    Lateral sphincterotomy for chronic anal fissure, Dr. Ebenezer Gamez, [facility]  4/18/2012    anal exam and injection of Botox 100 units into anal sphincter, Laila Sanchez, [facility]    APPENDECTOMY  1965   [address]    COLONOSCOPY  1/20/2004    cecal polyp, bx (no pathologic changes), Dr. Darion Mansfield, [address] [facility] COLONOSCOPY  8/11/2006    snare polypectomy terminal ileum polyp (granulation tissue polyp) and anal polyp (inflammatory/papilla), Dr. Reid, [facility]    COLONOSCOPY  3/23/2012    bx/cauterization proximal ascending colon polyp, injection of anal sphincter with Botox, Dr. Kelly Merlin, [address] [facility] JOINT REPLACEMENT  2003 [address] [facility], SKIN, SUB-Q TISSUE,MUSCLE,=<20 SQ CM Left 11/30/2018    LEFT LEG EXCISIONAL  DEBRIDEMENT WITH TISSUE BIOPSY performed by Haresh Nevarez DPM at [address] ENDOSCOPY  1/20/2004    gastritis, bx (no H pylori), Dr. Romana Whiting, [address] ENDOSCOPY N/A 6/1/2018    EGD BIOPSY performed by Vinnie Darby MD at [address] N/A 11/9/2018    EGD BIOPSY performed by Vinnie Darby MD at [address] history:   Tobacco:   reports that she quit smoking about 15 years ago. Her smoking use included cigarettes. She started smoking about 40 years ago. She has a 37.50 pack-year smoking history. She has never used smokeless tobacco.  Alcohol:   reports no history of alcohol use. Drugs:   reports no history of drug use. Family history:    Family History   Problem Relation Age of Onset    Heart Disease Mother     Diabetes Father     Colon Cancer Father     Other Father         BLINDNESS    Colon Cancer Sister     Diabetes Paternal Aunt     Diabetes Paternal Uncle     Diabetes Paternal Aunt     Diabetes Paternal Uncle        Allergy and drug reactions:    Allergies   Allergen Reactions    Statins Other (See Comments)     Muscle pains       Scheduled Meds:   miconazole   Topical BID    piperacillin-tazobactam  3.375 g Intravenous Q8H    And    sodium chloride  25 mL Intravenous Q8H    apixaban  5 mg Oral BID    bumetanide  1 mg Oral BID    dilTIAZem  240 mg Oral Daily    escitalopram  20 mg Oral Daily    ferrous sulfate  325 mg Oral Daily with breakfast    gabapentin  300 mg Oral TID    pantoprazole  40 mg Oral QAM AC    metoprolol succinate  100 mg Oral BID    montelukast  10 mg Oral 05/28/20 2232 05/29/20  0659   WBC 8.4 7.7   RBC 3.47* 3.22*   HGB 8.6* 8.3*   HCT 28.7* 26.8*   MCV 82.7 83.2   MCH 24.8* 25.8*   MCHC 30.0* 31.0*   RDW 14.6 14.6    278   MPV 9.9 9.7     Recent Labs     05/28/20 2232 05/29/20  0659    133   K 4.1 4.2   CL 94* 96*   CO2 26 23   BUN 17 13   CREATININE 0.8 0.6   GLUCOSE 506* 392*   CALCIUM 8.8 8.3*   PROT 7.1  --    LABALBU 3.4*  --    BILITOT <0.2  --    ALKPHOS 109*  --    AST 8  --    ALT 6  --      Beta-Hydroxybutyrate   Date Value Ref Range Status   05/29/2020 0.18 0.02 - 0.27 mmol/L Final   05/28/2020 0.04 0.02 - 0.27 mmol/L Final   10/13/2018 0.06 0.02 - 0.27 mmol/L Final     Lab Results   Component Value Date    LABA1C 17.1 05/26/2020    LABA1C 11.7 01/23/2020    LABA1C 11.5 11/07/2019     Lab Results   Component Value Date/Time    TSH 2.240 05/26/2020 12:00 PM    T4FREE 1.22 05/12/2017 02:52 PM     Lab Results   Component Value Date    LABA1C 17.1 05/26/2020    GLUCOSE 392 05/29/2020    GLUCOSE 181 12/16/2011    MALBCR <30 05/16/2018    LABMICR <12.0 05/12/2017    LABCREA 11 05/26/2018     Lab Results   Component Value Date    TRIG 512 05/26/2020    HDL 33 05/26/2020    LDLCALC - 05/26/2020    CHOL 287 05/26/2020       Blood culture   Lab Results   Component Value Date    BC 5 Days- no growth 12/24/2019    BC 5 Days- no growth 09/22/2019       Radiology:  CT ABDOMEN PELVIS W IV CONTRAST Additional Contrast? None   Final Result   1. Findings for dermatocellulitis of the subtenon soft tissues of the   more posterior inferior medial aspect of the right gluteal region. The   no conspicuous extension into the very spaces. The no abscess   formation seen presently. No air in the soft tissues. 2. Splenomegaly with borderline size liver to be correlate clinically. 3. Further increased size of the right adrenal gland no measuring 3.4   x 3 cm. The final interpretation can require correlation with   histopathology.  Please consider consultation with   endocrinology/surgery on routine bases. ALERT:  ABNORMAL REPORT. Jax Confer US DUP LOWER EXTREMITIES BILATERAL VENOUS   Final Result      No evidence for deep vein thrombosis of the lower extremities to the   level of the knee. XR CHEST PORTABLE   Final Result      No evidence for acute cardiopulmonary process. Medical Records/Labs/Images review:   I personally reviewed and summarized previous records   All labs and imaging were reviewed independently     Chris Preston, a 75 y.o.-old female seen today for inpatient diabetes management     Diabetes Mellitus type 2   · Patient's diabetes is grossly uncontrolled, A1c 17%   · For now, will change diabetes regimen to:  · Lantus 50 units BID   · Humalog 40 units with meals   · High dose sliding scale   · Continue glucose check with meals and at bedtime   · Will titrate insulin dose based on the blood glucose trend & insulin requirement  · Will arrange for patient to be seen in endocrinology clinic upon discharge for routine diabetes maintenance and prevention.     Rt adrenal mass  · I have reviewed all previous CT studies with our radiologist. Rt side sdrenal lesion was measuring 2.6 cm on previous CT (4/2009 and 2/2017) and now increased to 3.4 cm on recent CT scan   · Will order CT scan with ADRENAL protocol   · Obtain Cortisol, ACTH, Plasma free metanephrines    · Will also order 1 mg Dexamethasone suppression test to r/o Cushing's especially with HTN, DM, and wt gain   · Pt on Spironolactone and for this reason I am not going to check Renin and celso   · Given significant size change she will likely need adrenal biopsy (can be done as an outpatient) but we need to biochemically  r/o pheo before proceeding with biopsy     Rt Gluteal buttock abscess   · On broad spectrum antibiotics  · Managed by primary team       Interdisciplinary plan for communication with healthcare providers:   Consult recommendations were discussed with the Primary Service/Nursing staff      The above issues were reviewed with the patient who understood and agreed with the plan. Thank you for allowing us to participate in the care of this patient. Please do not hesitate to contact us with any additional questions. Adina Pack MD  Endocrinologist, [facility] Diabetes [facility] and Endocrinology   [address] [facility] [address]   Phone: [phone]  Fax: [phone]  --------------------------------------------  An electronic signature was used to authenticate this note.  Peng Null MD on 5/29/2020 at 12:52 PM    Services were provided through a video synchronous discussion

## 2020-05-29 NOTE — PROGRESS NOTES
Reason for consult: A1C 17%    A1C:   17.1%   [] Not available                 Patient states the following concerns/barriers to diabetes self-management:       [x] None       [] Medication cost   [] Food cost/availability          [] Reading  [] Hearing   [] Vision                  [] Work    [] Transportation  [] No insurance    [] Physical limitations    [] Other:              Diabetes survival packet provided to:   [x] Patient     [] Other:     Information reviewed: Definition of diabetes      Target glucose ranges/A1C      Medication adherence      The plate method/meal planning guidelines                Diabetes medications reviewed (use, purpose, action, side effects):  Lantus and Humalog    Comment:Patient provided educational material, \"Diabetes Survival Packet\". Patient states the following:    [x]  Drinks milk and regular pop during day  [x]  Eats 3 meals a day and 0 snacks per pt  [x]  Is not currently active  [x]  Take Humalog at meals and lantus nightly per pt  []      Alternative beverages to help with glycemic control reviewed. Pt states that she doesn't drink pop and milk everyday despite telling this  RD initially that she drinks regular pop daily with milk at all meals. Encouraged pt to change beverages to carb free choices. Diabetes plate method for meal planning encouraged. Diabetes medications reviewed. Pt contributes her high A1C solely to not having enough medication. Explained to pt that if she changed her diet than the glucose levels will improve. Pt states her diet is fine and then began to fall asleep during education. Contact information provided for future questions and concerns. Patient would likely benefit from [facility] classes after discharge.     Evaluation/Plan/Recommendations:   Patient's understanding of diabetes:   [x]Poor   []Fair    []Good   [] Excellent     Outpatient diabetes education is recommended:   [x] Yes  [] No   [] As needed  Patient is interested in outpatient diabetes education:   [] Yes    [] No    [x] Unsure     Pt states having diabetes education in past at [facility] ([facility] Recommended:     [] Consult to social work; patient has no insurance or financial hardship      [] Script for glucometer and supplies (per preference of patient's insurance)               [x] Script for outpatient diabetes education classes from PCP    [x] Carbohydrate-controlled diet    [] Patient prefers/educator recommends insulin pens instead of syringes     (if insulin ordered for home use)    Thank you for this consult.

## 2020-05-29 NOTE — ED NOTES
Bed: 11  Expected date:   Expected time:   Means of arrival:   Comments:  ems     Liz Beth RN  05/28/20 2112

## 2020-05-29 NOTE — PROGRESS NOTES
Messaged Dr. Madelin De La Torre to clarify that patient is due for 49units of humalog for a 290 blood sugar. Patient is NPO for CT ABD.

## 2020-05-29 NOTE — PROGRESS NOTES
Endocrinology consult placed via perfect serve to Dr Giovanna Leo. ID consult placed.   Patient information given to  Ashley Henning

## 2020-05-29 NOTE — PROGRESS NOTES
Nutrition Assessment    Type and Reason for Visit: Initial, Positive Nutrition Screen    Nutrition Recommendations: Recommend and start Ensure High Protein supplement BID to help meet increased nutritional needs. Also recommend and start Fabrizio wound healing supplement BID to help assist with proper wound healing. Sepsis not noted at this time. Nutrition Assessment: Patient at nutritional risk d/t increased needs from wound healing ; pt admitted with abscess and cellulitis of gluteal region ; hx of lymphedema ; will start nutritional supplementation     Malnutrition Assessment:  · Malnutrition Status: Insufficient data  · Context: Acute illness or injury  · Findings of the 6 clinical characteristics of malnutrition (Minimum of 2 out of 6 clinical characteristics is required to make the diagnosis of moderate or severe Protein Calorie Malnutrition based on AND/ASPEN Guidelines):  1. Energy Intake-Greater than 75% of estimated energy requirement, Greater than or equal to 5 days    2. Weight Loss-Unable to assess, unable to assess  3. Fat Loss-Unable to assess(data not available to assess at this time),    4. Muscle Loss-Unable to assess(data not available to assess at this time),    5. Fluid Accumulation-No significant fluid accumulation,    6.   Strength-Not measured    Nutrition Risk Level: Low    Nutrient Needs:  · Estimated Daily Total Kcal: 3698-1240 (REE 1645 x 1.2 SF)  · Estimated Daily Protein (g): 130-140 (2.5g/kg IBW)  · Estimated Daily Total Fluid (ml/day): 5148-3268    Nutrition Diagnosis:   · Problem: Increased nutrient needs  · Etiology: related to Increased demand for energy/nutrients     Signs and symptoms:  as evidenced by Presence of wounds    Objective Information:  · Nutrition-Focused Physical Findings: I&Os WNL, 2+ edema, active BS, rotund abd, dry mucous membranes, coated tongue, missing teeth, A&O x 4, dry/flaky skin, boggy heels, redness to BLEs, obesity, excoriation to gurmeet area · Wound Type: Multiple, Open Wounds, Wound Consult Pending(wounds x 2 noted ; black toes noted)     · Current Nutrition Therapies:  · Oral Diet Orders: Carb Control 4 Carbs/Meal   · Oral Diet intake: %, 51-75%(per nursing ; no meals recorded in flowsheets at this time ; appetite seemed to be adequate PTA)  · Oral Nutrition Supplement (ONS) Orders: None     · Anthropometric Measures:  · Ht: 5' 3\" (160 cm)   · Current Body Wt: 250 lb (113.4 kg)(5/28/20, stated)  · Admission Body Wt: 250 lb (113.4 kg)(5/28/20, stated)  · Usual Body Wt: 321 lb (145.6 kg)(9/22/19, bedscale )  ·  EMR shows past weight of 302# actual on 12/24/19 and 321# bedscale on 9/22/19   · Ideal Body Wt: 115 lb (52.2 kg), % Ideal Body 217%  · BMI Classification: BMI > or equal to 40.0 Obese Class III    Nutrition Interventions:   Continue current diet, Start ONS  Continued Inpatient Monitoring, Coordination of Care    Nutrition Evaluation:   · Evaluation: Goals set   · Goals: Patient will consume >75% of most meals served     · Monitoring: Meal Intake, Supplement Intake, Diet Tolerance, Wound Healing, Skin Integrity, I&O, Monitor Hemodynamic Status, Monitor Bowel Function, Weight, Pertinent Labs, Chewing/Swallowing      Electronically signed by Paulette Baumann RD, LD on 5/29/20 at 1:48 PM EDT    Contact Number: [phone]

## 2020-05-29 NOTE — CARE COORDINATION
5/29/2020 Care Coordination - spoke with pt to discuss discharge planning. PCP is Dr Quirino Ware. Pharmacy is [facility] on [address]. She lives with her  and 2 grandsons, age 21 and 24. In a 2 story home. She does not have to use the steps. She stays only on 1st floor. There is a lift chair to use for steps. She has a ww, wc, BSC, shower chair. She uses 4L NC at home. Home oxygen supplied by [employer]. She has a portable tank in room with her for transportation home. [facility] comes to house 3 times a week for therapy. No hx of [facility]. Was in [facility] last year for 9 months. Plan is home when medically stable. Family will provide transportation home. SW/CM will follow.

## 2020-05-29 NOTE — ED NOTES
Stopped VANCO, patient complaining of \"itching all over\", ER physician notified orders given.      Leonid Alex RN  05/29/20 4117

## 2020-05-29 NOTE — H&P
Hospitalist History & Physical      PCP: Mayo Alcala DO    Date of Admission: 5/28/2020    Date of Service: Pt seen/examined on 5/28/2020 and is admitted to Inpatient with expected LOS greater than two midnights due to medical therapy. Chief Complaint:  had concerns including Wound Check (wound on coccyx that has been increasingly painful. pt states she thinks it is infected. ). History Of Present Illness:    Ms. Mirza Kahn, a 75y.o. year old female  who  has a past medical history of Anal fissure, Anemia, Anxiety and depression, Arthritis, Asthma, Atrial fibrillation ([address] Blood transfusion, CHF (congestive heart failure) ([address] COPD (chronic obstructive pulmonary disease) ([address] Disc disorder, DM2 (diabetes mellitus, type 2) ([facility] Fall due to stumbling, GERD (gastroesophageal reflux disease), Hx of blood clots, Hyperlipidemia, Hypertension, Inappropriate sinus tachycardia, LVH (left ventricular hypertrophy), Lymphadenitis, chronic, Occipital neuralgia, Respiratory acidosis, and Sinus tachycardia. Briefly this is a 33-year-old female with a chronic right buttock wound, history of anal fissure, atrial fibrillation, COPD, diabetes mellitus, hypertension, hyperlipidemia, chronic lymphadenitis who presents to the emergency department at [facility] for evaluation of chronic coccyx wound with increasing pain and infection  Patient reports she was on clindamycin until May 26 which was prescribed by her primary care doctor. She reports increased discomfort and pain in the right gluteal region. Patient reports having some fevers and chills and worsening pain around the perirectal area. She reports she has been checking her blood sugars and taking her insulin as prescribed. She also reports she is on Eliquis. She does have chronic lymphedema and has pressure dressings in place.         Past Medical History:        Diagnosis Date    Anal fissure     Anemia 10/6/2017    Anxiety and depression     Arthritis     Asthma     [facility]     Blood transfusion     long time no reaction    CHF (congestive heart failure) (HCC)     Diastolic    COPD (chronic obstructive pulmonary disease) (HCC)     Disc disorder     DM2 (diabetes mellitus, type 2) ([facility]     on insulin    Fall due to stumbling 10/6/2017    GERD (gastroesophageal reflux disease)     Hx of blood clots     Hyperlipidemia     Hypertension     Inappropriate sinus tachycardia     LVH (left ventricular hypertrophy)     moderate    Lymphadenitis, chronic 4/13/2018    Occipital neuralgia 11/2/2011    Respiratory acidosis 10/7/2017    Sinus tachycardia 2/16/2015       Past Surgical History:        Procedure Laterality Date    ANOSCOPY  10/25/2006    injection of anal sphincter with Botox, Franklyn Ortiz/Timmy, [facility]    ANOSCOPY  4/9/2007    injection of anal sphincter with Botox, Dr. Livan Sprague, [address] [place] [address] ANOSCOPY  6/13/2007    injection of anal sphincter with Botox, Franklyn Sinha [facility]    ANUS SURGERY  4/4/2006    [facility]  4/[facility]/2012    anal exam and injection of Botox 100 units into anal sphincter, Laila Sinha, [facility]    APPENDECTOMY  1965   [address]    COLONOSCOPY  1/20/2004    cecal polyp, bx (no pathologic changes), Dr. Sheila Tanner, [address] [place] [address] COLONOSCOPY  8/11/2006    snare polypectomy terminal ileum polyp (granulation tissue polyp) and anal polyp (inflammatory/papilla), Dr. Livan Sprague, [address] [place] [address] COLONOSCOPY  3/23/2012    bx/cauterization proximal ascending colon polyp, injection of anal sphincter with Botox, Dr. Livan Sprague, [facility]  2003    [address], SKIN, SUB-Q TISSUE,MUSCLE,=<20 SQ CM Left 11/30/2018    LEFT LEG EXCISIONAL  DEBRIDEMENT WITH TISSUE BIOPSY performed by Jeffrey Andrade DPM at [place] [id number] Test 4 times a day & as needed for symptoms of irregular blood glucose. 2/26/20  Yes Javier Stoner, DO   vitamin D (ERGOCALCIFEROL) 1.25 MG (54650 UT) CAPS capsule TAKE 1 CAPSULE BY MOUTH TWICE WEEKLY 1/24/20  Yes Sowmya Alvarez, DO   ferrous sulfate 325 (65 Fe) MG tablet TAKE 1 TABLET BY MOUTH EVERY DAY WITH BREAKFAST 1/24/20  Yes Jennifer Alvarez, DO   montelukast (SINGULAIR) 10 MG tablet Take 1 tablet by mouth nightly 1/23/20  Yes Jennifer Alvarez, DO   escitalopram (LEXAPRO) 20 MG tablet TAKE 1 TABLET BY MOUTH DAILY 1/23/20  Yes Sowmya Alvarez, DO   apixaban (ELIQUIS) 5 MG TABS tablet TAKE 1 TABLET BY MOUTH TWICE A DAY 1/23/20  Yes Sowmya Alvarez, DO   albuterol sulfate  (90 Base) MCG/ACT inhaler Inhale 2 puffs into the lungs 4 times daily as needed for Wheezing 1/23/20  Yes Candy Holguin, DO   blood glucose test strips (ASCENSIA AUTODISC VI;ONE TOUCH ULTRA TEST VI) strip Use 4 times daily 1/23/20  Yes Jennifer Alvarez, DO   spironolactone (ALDACTONE) 25 MG tablet Take 1 tablet by mouth daily 12/19/19  Yes Jennifer Alvarez, DO   nystatin (MYCOSTATIN) 124908 UNIT/GM cream Apply topically 2 times daily. 12/7/19  Yes George Yuan, DO   lansoprazole (PREVACID) 30 MG delayed release capsule Take 1 capsule by mouth daily 11/27/19  Yes Sowmya Alvarez, DO   Icosapent Ethyl (VASCEPA) 1 g CAPS capsule Take 2 capsules by mouth 2 times daily 11/22/19  Yes Ofe Alvarez, DO   ULTICARE ALCOHOL SWABS 70 % PADS USE 3 TIMES A DAY 10/14/19  Yes Historical Provider, MD   BD INSULIN SYRINGE U/F 31G X 5/16\" 1 ML MISC USE AS DIRECTED 10/18/19  Yes Historical Provider, MD   Insulin Pen Needle 31G X 8 MM MISC 1 each by Does not apply route daily 11/7/19  Yes Ofe Alvarez, DO   miconazole (MICOTIN) 2 % powder Apply topically 2 times daily.  9/24/19  Yes Landon Carranza MD   metoprolol succinate (TOPROL XL) 200 MG extended release tablet Take 0.5 tablets by mouth 2 times daily 9/23/19  Yes Kaley Day, DO   acetaminophen (TYLENOL) 325 MG tablet Take 650 mg by mouth every 6 hours as needed for Pain   Yes Historical Provider, MD   glucose (GLUTOSE) 40 % GEL Take 37.5 mLs by mouth as needed (hypoglycemia) 12/20/18  Yes RAUL Tsang - CNP   OXYGEN Inhale 4 L into the lungs continuous    Yes Historical Provider, MD   Elastic Bandages & Supports MISC 20-30 mmHg bilateral compression stockings  Size to fit  Dx:  Edema  Knee high 12/14/17  Yes Lorraine Cr MD       Allergies:  Statins    Social History:    TOBACCO:   reports that she quit smoking about 15 years ago. Her smoking use included cigarettes. She started smoking about 40 years ago. She has a 37.50 pack-year smoking history. She has never used smokeless tobacco.  ETOH:   reports no history of alcohol use. Family History:    Reviewed in detail and negative for DM, CAD, Cancer, CVA. Positive as follows\"      Problem Relation Age of Onset    Heart Disease Mother     Diabetes Father     Colon Cancer Father     Other Father         BLINDNESS    Colon Cancer Sister     Diabetes Paternal Aunt     Diabetes Paternal Uncle     Diabetes Paternal Aunt     Diabetes Paternal Uncle        REVIEW OF SYSTEMS:   Pertinent positives as noted in the HPI. All other systems reviewed and negative. PHYSICAL EXAM:  BP (!) 124/56   Pulse 97   Temp 98.6 °F (37 °C) (Temporal)   Resp 18   Ht 5' 3\" (1.6 m)   Wt 250 lb (113.4 kg)   LMP  (LMP Unknown)   SpO2 99%   BMI 44.29 kg/m²   General appearance: No apparent distress, appears stated age and cooperative. HEENT: Normal cephalic, atraumatic without obvious deformity. Pupils equal, round, and reactive to light. Extra ocular muscles intact. Conjunctivae/corneas clear. Neck: Supple, with full range of motion. No jugular venous distention. Trachea midline. Respiratory: Diminished breath sounds bilaterally without wheezes rales or rhonchi. No respiratory distress.   Cardiovascular: Regular rate rhythm with normal S1-S2. She does have chronic lymphedema of bilateral lower extremities. Abdomen: Soft, nontender, nondistended  Musculoskeletal: No clubbing, cyanosis, 3+ swelling bilateral lower extremity, old dressing over with chronic skin changes that appear like tree bark. Brisk capillary refill. Skin: Intertrigo under abdominal pannus. Abscess noted in the right buttock. This is status post I&D by emergency physician on 5/29/2020  Neurologic:  Neurovascularly intact without any focal sensory/motor deficits. Cranial nerves: II-XII intact, grossly non-focal.  Psychiatric: Alert and oriented, thought content appropriate, normal insight    Reviewed EKG and CXR personally    CBC:   Recent Labs     05/26/20  1200 05/28/20 2232 05/29/20  0659   WBC 9.1 8.4 7.7   RBC 3.76 3.47* 3.22*   HGB 9.4* 8.6* 8.3*   HCT 32.3* 28.7* 26.8*   MCV 85.9 82.7 83.2   RDW 15.0 14.6 14.6    327 278     BMP:   Recent Labs     05/26/20  1200 05/28/20 2232 05/29/20  0659   * 133 133   K 5.1* 4.1 4.2   CL 84* 94* 96*   CO2 24 26 23   BUN 23 17 13   CREATININE 0.8 0.8 0.6   MG  --  1.7  --      LFT:  Recent Labs     05/26/20 1200 05/28/20 2232   PROT 7.4 7.1   ALKPHOS 110* 109*   ALT 7 6   AST 11 8   BILITOT 0.3 <0.2     CE:  Recent Labs     05/28/20 2232   TROPONINI <0.01     PT/INR: No results for input(s): INR, APTT in the last 72 hours. BNP: No results for input(s): BNP in the last 72 hours.   ESR:   Lab Results   Component Value Date    SEDRATE 41 (H) 12/24/2019     CRP:   Lab Results   Component Value Date    CRP 2.2 (H) 12/24/2019     D Dimer: No results found for: DDIMER   Folate and B12:   Lab Results   Component Value Date    NJAUGRKY23 397 11/07/2018   ,   Lab Results   Component Value Date    FOLATE 13.0 11/07/2018     Lactic Acid:   Lab Results   Component Value Date    LACTA 1.4 05/29/2020     Thyroid Studies:   Lab Results   Component Value Date    TSH 2.240 05/26/2020       Oupatient labs:  Lab Results   Component Value Date    CHOL 287 (H) 2020    TRIG 512 (H) 2020    HDL 33 2020    LDLCALC - (AA) 2020    TSH 2.240 2020    INR 1.5 2018    LABA1C 17.1 (H) 2020       Urinalysis:    Lab Results   Component Value Date    NITRU Negative 2020    WBCUA 1-3 2020    BACTERIA RARE 2020    RBCUA 0-1 2020    BLOODU Negative 2020    SPECGRAV <=1.005 2020    GLUCOSEU >=1000 2020       Imaging:  Ct Abdomen Pelvis W Iv Contrast Additional Contrast? None    Result Date: 2020  Patient MRN:  [id number] : 1955 Age: 75 years Gender: Female Order Date:  2020 9:45 PM TECHNIQUE/NUMBER OF IMAGES/COMPARISON/CLINICAL HISTORY: CT abdomen pelvis. After IV contrast axial images were obtained sagittal and coronal reconstructions Comparison study  [age]year-old female patient with the history of abscess right interlobar but. The perirectal disease. FINDINGS: There are subcutaneous soft tissue edema and swelling in the more posterior medial aspect of the right buttock area in the subcutaneous soft tissues with thickening of the dermis or. There is no significant extension of the inflammatory process into the perirectal area. No perirectal abscess seen. The no abscess seen in the subtenon soft tissues are. The there is some extension into the deep the muscle fascial plane but doesn't appears to be conspicuous extension into intermuscular planes. In the upper abdomen there is a splenomegaly with the spleen measuring 15 x 5 cm. The liver has borderline size. There is normal enhancement for the liver and spleen. Pancreas demonstrates atrophy and partial fat replacement. Gallbladder is nondistended. Biliary tree and pancreatic ductal system are not dilated. There is further increased size of the right adrenal gland since the previous examination presently measures 3.4 x 3 cm previously was 2.6 x 1.6 cm.  The left adrenal gland is not enlarged. Kidneys have preserved size and cortical thickness. Parapelvic cysts are seen in both kidneys. There is normal cortical enhancement for the kidneys. The ureters are patent. The bladder is not distended but appears unremarkable. The uterus is enlarged, it measures 11 x 3.8 x 7.3 cm, has discrete loculations of the outlines with the represent a diameters. Ultrasound can be helpful. The ovaries have upper borderline size with unremarkable appearance. No fluid is seen in the cul-de-sac. There are no acute inflammatory changes in the omental mesenteric fat planes, free intraperitoneal air, ascites or indication for bowel obstruction. Abdominal aorta and IVC appear unremarkable. Could not conspicuously identify the appendix or separate the appendix from adjacent bowel segments but there is no pericecal pericolonic inflammatory process. Lower lung bases demonstrate no significant findings. Some discrete residual scar is seen in the lower lobes. Calcifications are seen in the coronary arteries . There is some prominent diameter for the central pulmonary arteries to be correlated clinically. The heart has upper normal size. Degenerative changes are seen in the lumbar spine particularly in L4-5, and L5-S1 disc space and in the corresponding facet joints. 1. Findings for dermatocellulitis of the subtenon soft tissues of the more posterior inferior medial aspect of the right gluteal region. The no conspicuous extension into the very spaces. The no abscess formation seen presently. No air in the soft tissues. 2. Splenomegaly with borderline size liver to be correlate clinically. 3. Further increased size of the right adrenal gland no measuring 3.4 x 3 cm. The final interpretation can require correlation with histopathology. Please consider consultation with endocrinology/surgery on routine bases. ALERT:  ABNORMAL REPORT. Dara Rodriguez Chest Portable    Result Date: 5/28/2020  Clinical indications: Wound check. Dyspnea. TECHNIQUE: Single frontal projection of the chest (1 view). COMPARISON: None. FINDINGS: The heart is enlarged. Acromioclavicular arthropathy. The heart, lungs, mediastinum and regional skeleton are otherwise unremarkable. No evidence for acute cardiopulmonary process. Us Dup Lower Extremities Bilateral Venous    Result Date: 5/28/2020  Real-time and Doppler sonography of the deep venous structures of the lower extremities. Grayscale, color Doppler, and spectral Doppler analysis. There is adequate and spontaneous blood flow, compressibility and augmentation within the bilateral common femoral, superficial femoral, and popliteal veins. Below the knee, there were no diagnostic images. No evidence for deep vein thrombosis of the lower extremities to the level of the knee. ASSESSMENT:    Right gluteal buttock abscess  Ambulatory dysfunction  Hyperglycemia  Protein calorie malnutrition, moderate  Chronic microcytic anemia  Very uncontrolled diabetes mellitus, as evidenced by an A1c of 17 (?)  Chronic diastolic heart failure  COPD, stable  Gastroesophageal reflux disease  Hypertension  Hyperlipidemia  History of sinus tachycardia  Chronic lymphadenitis  Anxiety and depression  Right adrenal gland    Doppler ultrasound of bilateral lower extremity negative for DVT    CT abdomen pelvis with intermittent cellulitis of the septae none soft tissue of the more posterior inferior medial aspect of right gluteal region. No conspicuous extension, no obvious abscess formation.       PLAN:    Patient status post vancomycin and cefepime for gluteal abscess in the ER  Continue with broad-spectrum antibiotics, Vanco and Zosyn  ID consultation, check procalcitonin, check blood cultures, check ESR CRP, wound care eval  Continue with intertrigo treatment with Micatin powder  Continue metoprolol and Cardizem  Continue Lantus 40 mg twice daily  Humalog 30 mg plus sliding scale with each meal.  Avoid hypoglycemia. Micatin powder for intertrigo  Bumex 1 mg 2 times daily at home dose  Patient's A1c  warrants endocrinology consultation as well. Diet: DIET CARB CONTROL;  Code Status: Prior    Surrogate decision maker confirmed with patient:  Primary Emergency Contact: Nicolas Betancur, Home Phone: [phone]    DVT Prophylaxis: []Lovenox []Heparin []PCD [x] [address] []Encouraged ambulation    Disposition: []Med/Surg [x] Intermediate [] ICU/CCU  Admit status: [] Observation [x] Inpatient     +++++++++++++++++++++++++++++++++++++++++++++++++  [address]  +++++++++++++++++++++++++++++++++++++++++++++++++  NOTE: This report was transcribed using voice recognition software. Every effort was made to ensure accuracy; however, inadvertent computerized transcription errors may be present.

## 2020-05-29 NOTE — FLOWSHEET NOTE
Inpatient Wound Care(initial evaluation) 8411    Admit Date: 5/28/2020  9:12 PM    Reason for consult:  Right buttocks    Significant history: admitted for concerns wound Check    Includes Anal fissure, Anemia, Anxiety and depression, Arthritis, Asthma, Atrial fibrillation ([facility] Blood transfusion, CHF (congestive heart failure) ([facility] COPD (chronic obstructive pulmonary disease) ([facility] Disc disorder, DM2 (diabetes mellitus, type 2) ([facility] Fall due to stumbling, GERD (gastroesophageal reflux disease), Hx of blood clots, Hyperlipidemia, Hypertension, Inappropriate sinus tachycardia, LVH (left ventricular hypertrophy), Lymphadenitis, chronic, Occipital neuralgia, Respiratory acidosis, and Sinus tachycardia.        Wound history: admitted with chronic wound from facility  I & D completed in the ED per notes    Findings:     05/29/20 1130   Skin Integrity   Skin Integrity Redness;Rash;Excoriation   Location abdomina fold, groins   Skin Integrity Site 2   Skin Integrity Location 2   (dry thick layers of skin)   Location 2 BLE- right greater than left   Skin Integrity Site 3   Skin Integrity Location 3 Weeping    Location 3 left posterior leg   Skin Integrity Site 4   Skin Integrity Location 4   (thick dry skin)   Location 4 heels, feet   Skin Integrity Site 5   Skin Integrity Location 5 Redness  (edema)   Location 5 BLE   Wound 05/29/20 Buttocks Inner;Right   Date First Assessed/Time First Assessed: 05/29/20 0530   Present on Hospital Admission: Yes  Location: Buttocks  Wound Location Orientation: Inner;Right   Wound Image   (very difficult to get a good picture due to location)   Wound   (abscess)   Dressing Changed Changed/New   Dressing/Treatment Alginate;Dry dressing   Wound Cleansed Rinsed/Irrigated with saline   Wound Length (cm) 4 cm   Wound Width (cm) 2 cm   Wound Depth (cm) 3 cm   Wound Surface Area (cm^2) 8 cm^2   Change in Wound Size % (l*w) 0   Wound Volume (cm^3) 24 cm^3   Wound Assessment

## 2020-05-29 NOTE — ED PROVIDER NOTES
ED Physician  HPI:  5/28/20, Time: 9:38 PM EDT         Tony Spence is a 75 y.o. female presenting to the ED for worsening right buttock wound. Patient reports 3-day history of right buttock wound. States that she did see her primary care physician on May 26 and was started on clindamycin. She reports that that was pretty much when she started noticing the discomfort but may be a day prior. She overall appears to not be taking very good care of herself per her physician note a long discussion was had because she has not been going to her follow-up doctor appointments as scheduled mainly due to transportation issues. Patient reports that she has been taking the medication, clindamycin that was prescribed but does admit that it is not getting any better. She also reports feeling feverish and having pain to the site as well as now a new concerning yeast infection to her groin and gurmeet-area. Patient is a diabetic, she is on insulin she also does wear home O2 due to congestive heart failure and she also is on Eliquis. Patient also admitted that she has not been checking her blood sugars but has been compliant with taking her insulin. Patient otherwise denies chest pain denies shortness of breath denies abdominal pain she also denies any nausea, vomiting or diarrhea. She does also have chronic lymphedema did have some Covan wrap dressings in place but dressings were covered in filth. Patient reports that she has been having increasing difficulty with ambulation but normally can ambulate with a walker at home.     Review of Systems:   Pertinent positives and negatives are stated within HPI, all other systems reviewed and are negative.          --------------------------------------------- PAST HISTORY ---------------------------------------------  Past Medical History:  has a past medical history of Anal fissure, Anemia, Anxiety and depression, Arthritis, Asthma, Atrial fibrillation ([address] Blood transfusion, CHF (congestive heart failure) (HCC), COPD (chronic obstructive pulmonary disease) ([facility] [address] Disc disorder, DM[facility]diabetes mellitus, type 2) [facility] Fall due to stumbling, GERD (gastroesophageal reflux disease), Hx of blood clots, Hyperlipidemia, Hypertension, Inappropriate sinus tachycardia, LVH (left ventricular hypertrophy), Lymphadenitis, chronic, Occipital neuralgia, Respiratory acidosis, and Sinus tachycardia. Past Surgical History:  has a past surgical history that includes Tonsillectomy (); Appendectomy ();  section ();  section ();  section (); Anus surgery (2006); Upper gastrointestinal endoscopy (2004); Colonoscopy (2004); Colonoscopy (2006); anoscopy (10/25/2006); anoscopy (2007); anoscopy (2007); Colonoscopy (3/23/2012); Anus surgery (2012); joint replacement (); Upper gastrointestinal endoscopy (N/A, 2018); Upper gastrointestinal endoscopy (N/A, 2018); and pr debridement, skin, sub-q tissue,muscle,=<20 sq cm (Left, 2018). Social History:  reports that she quit smoking about 15 years ago. Her smoking use included cigarettes. She started smoking about 40 years ago. She has a 37.50 pack-year smoking history. She has never used smokeless tobacco. She reports that she does not drink alcohol or use drugs. Family History: family history includes Colon Cancer in her father and sister; Diabetes in her father, paternal aunt, paternal aunt, paternal uncle, and paternal uncle; Heart Disease in her mother; Other in her father. The patients home medications have been reviewed.     Allergies: Statins    -------------------------------------------------- RESULTS -------------------------------------------------  All laboratory and radiology results have been personally reviewed by myself   LABS:  Results for orders placed or performed during the hospital encounter of 20   Troponin   Result Value Ref Range Troponin <0.01 0.00 - 0.03 ng/mL   CBC Auto Differential   Result Value Ref Range    WBC 8.4 4.5 - 11.5 E9/L    RBC 3.47 (L) 3.50 - 5.50 E12/L    Hemoglobin 8.6 (L) 11.5 - 15.5 g/dL    Hematocrit 28.7 (L) 34.0 - 48.0 %    MCV 82.7 80.0 - 99.9 fL    MCH 24.8 (L) 26.0 - 35.0 pg    MCHC 30.0 (L) 32.0 - 34.5 %    RDW 14.6 11.5 - 15.0 fL    Platelets 611 615 - 851 E9/L    MPV 9.9 7.0 - 12.0 fL    Neutrophils % 78.1 43.0 - 80.0 %    Immature Granulocytes % 0.5 0.0 - 5.0 %    Lymphocytes % 13.6 (L) 20.0 - 42.0 %    Monocytes % 6.2 2.0 - 12.0 %    Eosinophils % 1.1 0.0 - 6.0 %    Basophils % 0.5 0.0 - 2.0 %    Neutrophils Absolute 6.60 1.80 - 7.30 E9/L    Immature Granulocytes # 0.04 E9/L    Lymphocytes Absolute 1.15 (L) 1.50 - 4.00 E9/L    Monocytes Absolute 0.52 0.10 - 0.95 E9/L    Eosinophils Absolute 0.09 0.05 - 0.50 E9/L    Basophils Absolute 0.04 0.00 - 0.20 E9/L   Comprehensive Metabolic Panel   Result Value Ref Range    Sodium 133 132 - 146 mmol/L    Potassium 4.1 3.5 - 5.0 mmol/L    Chloride 94 (L) 98 - 107 mmol/L    CO2 26 22 - 29 mmol/L    Anion Gap 13 7 - 16 mmol/L    Glucose 506 (HH) 74 - 99 mg/dL    BUN 17 8 - 23 mg/dL    CREATININE 0.8 0.5 - 1.0 mg/dL    GFR Non-African American >60 >=60 mL/min/1.73    GFR African American >60     Calcium 8.8 8.6 - 10.2 mg/dL    Total Protein 7.1 6.4 - 8.3 g/dL    Alb 3.4 (L) 3.5 - 5.2 g/dL    Total Bilirubin <0.2 0.0 - 1.2 mg/dL    Alkaline Phosphatase 109 (H) 35 - 104 U/L    ALT 6 0 - 32 U/L    AST 8 0 - 31 U/L   Magnesium   Result Value Ref Range    Magnesium 1.7 1.6 - 2.6 mg/dL   Lactic Acid, Plasma   Result Value Ref Range    Lactic Acid 2.8 (H) 0.5 - 2.2 mmol/L   Urinalysis   Result Value Ref Range    Color, UA Yellow Straw/Yellow    Clarity, UA Clear Clear    Glucose, Ur >=1000 (A) Negative mg/dL    Bilirubin Urine Negative Negative    Ketones, Urine Negative Negative mg/dL    Specific Gravity, UA <=1.005 1.005 - 1.030    Blood, Urine Negative Negative    pH, UA 5.5 5.0 - 9.0    Protein, UA Negative Negative mg/dL    Urobilinogen, Urine 0.2 <2.0 E.U./dL    Nitrite, Urine Negative Negative    Leukocyte Esterase, Urine TRACE (A) Negative   Microscopic Urinalysis   Result Value Ref Range    WBC, UA 1-3 0 - 5 /HPF    RBC, UA 0-1 0 - 2 /HPF    Bacteria, UA RARE (A) None Seen /HPF    Yeast, UA Present (A) None Seen /HPF   Beta-Hydroxybutyrate   Result Value Ref Range    Beta-Hydroxybutyrate 0.18 0.02 - 0.27 mmol/L   Beta-Hydroxybutyrate   Result Value Ref Range    Beta-Hydroxybutyrate 0.04 0.02 - 0.27 mmol/L   pH, venous   Result Value Ref Range    pH, Sage 7.32 (L) 7.35 - 7.45   Lactic Acid, Plasma   Result Value Ref Range    Lactic Acid 1.4 0.5 - 2.2 mmol/L   COVID-19   Result Value Ref Range    SARS-CoV-2, NAAT Not Detected Not Detected   EKG 12 Lead   Result Value Ref Range    Ventricular Rate 103 BPM    Atrial Rate 103 BPM    P-R Interval 140 ms    QRS Duration 110 ms    Q-T Interval 372 ms    QTc Calculation (Bazett) 487 ms    P Axis 61 degrees    R Axis 65 degrees    T Axis 47 degrees       RADIOLOGY:  Interpreted by Radiologist.  CT ABDOMEN PELVIS W IV CONTRAST Additional Contrast? None   Final Result   1. Findings for dermatocellulitis of the subtenon soft tissues of the   more posterior inferior medial aspect of the right gluteal region. The   no conspicuous extension into the very spaces. The no abscess   formation seen presently. No air in the soft tissues. 2. Splenomegaly with borderline size liver to be correlate clinically. 3. Further increased size of the right adrenal gland no measuring 3.4   x 3 cm. The final interpretation can require correlation with   histopathology. Please consider consultation with   endocrinology/surgery on routine bases. ALERT:  ABNORMAL REPORT. Karan Hurtado US DUP LOWER EXTREMITIES BILATERAL VENOUS   Final Result      No evidence for deep vein thrombosis of the lower extremities to the   level of the knee.       XR CHEST below) described. Performed By: Dr Sue Fernando. Indication: Abscess  Informed consent obtained: The patient was counseled regarding the procedure, it's indications, risks, potential complications and alternatives and any questions were answered. Consent was obtained. .  Prep: The skin was cleansed with povidone iodine and draped in a sterile fashion. Anesthetic: The wound area was anesthetized with Lidocaine 1% without epinephrine. Incision: Soft tissue abscess of Buttocks and  was Incised by scalpal and minimal fluid was drained. A wound culture was not obtained. The wound  was not irrigated and was packed with iodoform gauze. The wound was then covered with a sterile dressing.   Patient tolerated the procedure well.       ------------------------------ ED COURSE/MEDICAL DECISION MAKING----------------------  Medications   nystatin (MYCOSTATIN) cream ( Topical Given 5/28/20 2353)   vancomycin 1.5 g in dextrose 5% 300 mL IVPB (has no administration in time range)   cefepime (MAXIPIME) 2 g IVPB extended (mini-bag) (2 g Intravenous New Bag 5/29/20 0301)   0.9 % sodium chloride bolus (1,000 mLs Intravenous New Bag 5/29/20 0302)   0.9 % sodium chloride bolus (0 mLs Intravenous Stopped 5/29/20 0252)   morphine sulfate (PF) injection 4 mg (4 mg Intravenous Given 5/28/20 2239)   ondansetron (ZOFRAN) injection 4 mg (4 mg Intravenous Given 5/28/20 2239)   miconazole (MICOTIN) 2 % powder ( Topical Given 5/29/20 0202)   insulin regular (HUMULIN R;NOVOLIN R) injection 5 Units (5 Units Intravenous Given 5/29/20 0259)   iopamidol (ISOVUE-370) 76 % injection 110 mL (110 mLs Intravenous Given 5/29/20 0029)   fluconazole (DIFLUCAN) tablet 200 mg (200 mg Oral Given 5/29/20 0157)   morphine 4 MG/ML injection (4 mg  Given 5/29/20 0159)   oxyCODONE-acetaminophen (PERCOCET) 5-325 MG per tablet 1 tablet (1 tablet Oral Given 5/29/20 0323)         ED COURSE:       Medical Decision Making: Plan will be for labs will also obtain imaging will rule out perirectal abscess. Labs resulted troponin negative, CBC with white blood cell count of 8.4, hemoglobin hematocrit slightly low for patient at baseline, and patient normally at 9-10 today she is 8.6 and 28.7. Chemistry panel resulted with elevated blood glucose of 506. Anion gap is negative, patient did receive 1 L normal saline we will also provide her with 5 units regular insulin IV, lactic acid level elevated at 2.8, magnesium level normal at 1.7, Urinalysis showing trace leukocytes, 1-3 WBCs and positive for yeast we will also provide her for Diflucan, ultrasound of the bilateral lower extremity showing no evidence of any DVTs. Chest x-ray unremarkable. Twelve-lead EKG interpreted showing heart rate of 103, sinus tachycardia. Normal axis deviation. No acute ST elevation or depression noted. Repeat lactic 1.4 following IV fluids. Beta-Hydroxybutyrate 0.18, Covid-19 not detected, pH 7.32. Patient without any suspicion for DKA, she was provided second liter of normal saline she was also provided with IV insulin subcu awaiting repeat glucose. Patient also received IV vancomycin and IV cefepime for abscess to buttock with concerning surrounding cellulitis. CT abdomen resulted did show findings for a dermatitis cellulitis of the soft tissues of the posterior inferior medial aspect of the right gluteal region. There is no obvious abscess but an abscess and drainage was completed and minimal drainage noted in area with packing. Plan will be for medical admission. Patient at this point is not safe for discharge home she not able to fully ambulate and will not be able to safely care for herself. Patient expressed understanding for admission. She was once again medicated for additional pain relief. Patient will be admitted to telemetry and patient awaiting return phone call from [facility] physician. Counseling:    The emergency provider has spoken with the patient and discussed todays results, in addition to providing specific details for the plan of care and counseling regarding the diagnosis and prognosis. Questions are answered at this time and they are agreeable with the plan.      --------------------------------- IMPRESSION AND DISPOSITION ---------------------------------    IMPRESSION  1. Abscess and cellulitis of gluteal region    2. Hyperglycemia    3. Lactic acidosis    4. Fungal infection of the groin    5. Lymphedema        DISPOSITION  Disposition: Admit to telemetry  Patient condition is good      NOTE: This report was transcribed using voice recognition software.  Every effort was made to ensure accuracy; however, inadvertent computerized transcription errors may be present     RAUL Gordon - CNP  05/29/20 6009

## 2020-05-29 NOTE — PROGRESS NOTES
Pharmacy Consultation Note  (Antibiotic Dosing and Monitoring)    Initial consult date: 2020  Consulting physician: Dr. Juliane Hastings  Drug(s): vancomycin  Indication: CSSSI (coccyx/gluteal infection/abscess)  Age/Gender IBW DW  Allergy Information   64 y.o./F; 160 cm, 113.4 kg 52.9 kg 77.1 kg  Statins             Date  WBC BUN/CR Drug/Dose Time   Given Level(s)   (Time) Comments        7.7   13/0.6 vancomycin 1500 mg x1    vanco 1250 mg q12h 0408    <1700>                                  Estimated Creatinine Clearance: 112 mL/min (based on SCr of 0.6 mg/dL). Intake/Output Summary (Last 24 hours) at 2020 1002  Last data filed at 2020 1001  Gross per 24 hour   Intake 1000 ml   Output 1200 ml   Net -200 ml       Temp max: Temp (24hrs), Av.4 °F (36.9 °C), Min:97.7 °F (36.5 °C), Max:98.6 °F (37 °C)      Cultures:  available culture and sensitivity results were reviewed in [facility]      Assessment:  · Consulted by Dr. Juliane Hastings to dose/monitor vancomycin. · Goal trough level:  15-20 mCg/mL. · 65 yo/F admitted from home for coccyx wound infection/abscess. Started on PO clindamycin as OP on . · Empiric ATBs (vanco and cefepime) started in ED; cefepime changed to pip-tazo on admission. · Fluconazole started for vaginal yeast infection. · ID has been consulted. Plan:  · Start vancomycin 1250 mg q12h. · Will check vancomycin trough level when steady state is attained if vanco continues and if Pharmacy is to continue the consult. · Pharmacist will follow and monitor/adjust dosing as necessary.     Jay Freeman, PharmD  2020  10:07 AM  Pager: [phone]

## 2020-05-29 NOTE — CONSULTS
[address] ANUS SURGERY  4/18/2012    anal exam and injection of Botox 100 units into anal sphincter, Laila Wasserman, [facility]    APPENDECTOMY  1965   [address]    COLONOSCOPY  1/20/2004    cecal polyp, bx (no pathologic changes), Dr. Adama Laureano, [facility] COLONOSCOPY  8/11/2006    snare polypectomy terminal ileum polyp (granulation tissue polyp) and anal polyp (inflammatory/papilla), Dr. Josefa Briggs, [facility] COLONOSCOPY  3/23/2012    bx/cauterization proximal ascending colon polyp, injection of anal sphincter with Botox, Dr. Josefa Briggs, [address] REPLACEMENT  2003 [address] [facility], SKIN, SUB-Q TISSUE,MUSCLE,=<20 SQ CM Left 11/30/2018    LEFT LEG EXCISIONAL  DEBRIDEMENT WITH TISSUE BIOPSY performed by Srinivasan Ambrosio DPM at [address] [facility] ENDOSCOPY  1/20/2004    gastritis, bx (no H pylori), Dr. Adama Laureano, [address] ENDOSCOPY N/A 6/1/2018    EGD BIOPSY performed by Segundo Nevarez MD at [facility] N/A 11/9/2018    EGD BIOPSY performed by Segundo Nevarez MD at [facility] ENDOSCOPY     Current Medications:   Scheduled Meds:   miconazole   Topical BID    piperacillin-tazobactam  3.375 g Intravenous Q8H    And    sodium chloride  25 mL Intravenous Q8H    apixaban  5 mg Oral BID    bumetanide  1 mg Oral BID    dilTIAZem  240 mg Oral Daily    escitalopram  20 mg Oral Daily    ferrous sulfate  325 mg Oral Daily with breakfast    gabapentin  300 mg Oral TID    pantoprazole  40 mg Oral QAM AC    metoprolol succinate  100 mg Oral BID    montelukast  10 mg Oral Nightly    spironolactone  25 mg Oral Daily    vitamin D  50,000 Units Oral Weekly    vancomycin  1,250 mg Intravenous Q12H    omega-3 acid ethyl esters  2 g Oral BID    insulin glargine  50 Units Subcutaneous BID    insulin lispro  40 Units Subcutaneous TID   insulin lispro  0-18 Units Subcutaneous TID WC    insulin lispro  0-9 Units Subcutaneous Nightly    nystatin   Topical BID     Continuous Infusions:   dextrose       PRN Meds:glucose, dextrose, glucagon (rDNA), dextrose, oxyCODONE-acetaminophen, acetaminophen, albuterol, magic (miracle) mouthwash, traZODone, diphenhydrAMINE    Allergies:  Statins    Social History:   Social History     Socioeconomic History    Marital status:      Spouse name: Not on file    Number of children: 3    Years of education: 9    Highest education level: 9th grade   Occupational History    Occupation: SSD   Social Needs    Financial resource strain: Not hard at all   [place] insecurity     Worry: Never true     Inability: Never true    Transportation needs     Medical: Yes     Non-medical: No   Tobacco Use    Smoking status: Former Smoker     Packs/day: 1.50     Years: 25.00     Pack years: 37.50     Types: Cigarettes     Start date: 11/27/1979     Last attempt to quit: 12/18/2004     Years since quitting: 15.4    Smokeless tobacco: Never Used    Tobacco comment: Stopped smoking 16 years ago   Substance and Sexual Activity    Alcohol use: No     Frequency: Never     Comment: caffeine: no use    Drug use: No    Sexual activity: Not Currently     Partners: Male   Lifestyle    Physical activity     Days per week: 0 days     Minutes per session: 0 min    Stress: Not at all   Relationships    Social connections     Talks on phone: More than three times a week     Gets together: More than three times a week     Attends [employer] service: Never     Active member of club or organization: No     Attends meetings of clubs or organizations: Never     Relationship status:     Intimate partner violence     Fear of current or ex partner: Not on file     Emotionally abused: Not on file     Physically abused: Not on file     Forced sexual activity: Not on file   Other Topics Concern    Not on file   Social History Narrative    Denies caffeine. Grandson shops for her and helps around the house    Patient unable to exercise d/t mobility status     Patient lives with her  who has limited speech ability d/t hx of CVA. He is able to assist patient with IADLS     Tobacco: No  Alcohol: No  Pets: No  Travel: No    Family History:       Problem Relation Age of Onset    Heart Disease Mother     Diabetes Father     Colon Cancer Father     Other Father         BLINDNESS    Colon Cancer Sister     Diabetes Paternal Aunt     Diabetes Paternal Uncle     Diabetes Paternal Aunt     Diabetes Paternal Uncle    . Otherwise non-pertinent to the chief complaint. REVIEW OF SYSTEMS:    CONSTITUTIONAL:  No chills, fevers or night sweats. No loss of weight. EYES:  No double vision or drainage from eyes, ears or throat. HEENT:  No neck stiffness. No dysphagia. No drainage from eyes, ears or throat  RESPIRATORY:  No cough, productive sputum or hemoptysis. CARDIOVASCULAR:  No chest pain, palpitations, orthopnea or dyspnea on exertion. GASTROINTESTINAL:  No nausea, vomiting, diarrhea or constipation or hematochezia   GENITOURINARY:  No frequency burning dysuria or hematuria. INTEGUMENT/BREAST:  No rash or breast masses. HEMATOLOGIC/LYMPHATIC:  No lymphadenopathy or blood dyscrasics. ALLERGIC/IMMUNOLOGIC:  No anaphylaxis. ENDOCRINE:  No polyuria or polydipsia or temperature intolerance. MUSCULOSKELETAL:  No myalgia or arthralgia. Full ROM. NEUROLOGICAL:  No focal motor sensory deficit. BEHAVIOR/PSYCH:  No psychosis. PHYSICAL EXAM:    Vitals:    BP (!) 124/56   Pulse 97   Temp 98.6 °F (37 °C) (Temporal)   Resp 18   Ht 5' 3\" (1.6 m)   Wt 250 lb (113.4 kg)   LMP  (LMP Unknown)   SpO2 99%   BMI 44.29 kg/m²   Constitutional: The patient is awake, alert, and oriented. Skin: Warm and dry. No rashes were noted. HEENT: Eyes show round, and reactive pupils. No jaundice.  Moist mucous membranes, no ulcerations, no thrush. Neck: Supple to movements. No lymphadenopathy. Chest: No use of accessory muscles to breathe. Symmetrical expansion. Auscultation reveals no wheezing, crackles, or rhonchi. Cardiovascular: S1 and S2 are rhythmic and regular. No murmurs appreciated. Abdomen: Positive bowel sounds to auscultation. Benign to palpation. No masses felt. No hepatosplenomegaly. Extremities: No clubbing, no cyanosis, no edema.   Lines: peripheral      CBC+dif:  Recent Labs     05/28/20  2232 05/29/20  0659   WBC 8.4 7.7   HGB 8.6* 8.3*   HCT 28.7* 26.8*   MCV 82.7 83.2    278   NEUTROABS 6.60 5.72     Lab Results   Component Value Date    CRP 14.0 (H) 05/29/2020    CRP 2.2 (H) 12/24/2019    CRP 2.0 (H) 09/22/2019     No results found for: [facility]  Lab Results   Component Value Date    SEDRATE 75 (H) 05/29/2020    SEDRATE 41 (H) 12/24/2019    SEDRATE 38 (H) 09/22/2019     Lab Results   Component Value Date    ALT 6 05/28/2020    AST 8 05/28/2020    ALKPHOS 109 (H) 05/28/2020    BILITOT <0.2 05/28/2020     Lab Results   Component Value Date     05/29/2020    K 4.2 05/29/2020    K 5.4 01/19/2020    CL 96 05/29/2020    CO2 23 05/29/2020    BUN 13 05/29/2020    CREATININE 0.6 05/29/2020    GFRAA >60 05/29/2020    LABGLOM >60 05/29/2020    GLUCOSE 392 05/29/2020    GLUCOSE 181 12/16/2011    PROT 7.1 05/28/2020    LABALBU 3.4 05/28/2020    LABALBU 4.5 11/02/2011    CALCIUM 8.3 05/29/2020    BILITOT <0.2 05/28/2020    ALKPHOS 109 05/28/2020    AST 8 05/28/2020    ALT 6 05/28/2020       Lab Results   Component Value Date    PROTIME 16.4 11/27/2018    INR 1.5 11/27/2018       Lab Results   Component Value Date    TSH 2.240 05/26/2020       Lab Results   Component Value Date    NITRITE negative 07/26/2018    COLORU Yellow 05/28/2020    PHUR 5.5 05/28/2020    WBCUA 1-3 05/28/2020    RBCUA 0-1 05/28/2020    YEAST Present 05/28/2020    BACTERIA RARE 05/28/2020    CLARITYU Clear 05/28/2020    SPECGRAV <=1.005 05/28/2020 LEUKOCYTESUR TRACE 05/28/2020    UROBILINOGEN 0.2 05/28/2020    BILIRUBINUR Negative 05/28/2020    BILIRUBINUR negative 07/26/2018    BLOODU Negative 05/28/2020    GLUCOSEU >=1000 05/28/2020       No results found for: AMT2UFK, BEART, Y6XORHPK, PHART, THGBART, NZL3DTK, PO2ART, TOK7ZIU  Radiology:  CT ABDOMEN PELVIS W IV CONTRAST Additional Contrast? None   Final Result   1. Findings for dermatocellulitis of the subtenon soft tissues of the   more posterior inferior medial aspect of the right gluteal region. The   no conspicuous extension into the very spaces. The no abscess   formation seen presently. No air in the soft tissues. 2. Splenomegaly with borderline size liver to be correlate clinically. 3. Further increased size of the right adrenal gland no measuring 3.4   x 3 cm. The final interpretation can require correlation with   histopathology. Please consider consultation with   endocrinology/surgery on routine bases. ALERT:  ABNORMAL REPORT. Graylon Kwadwo US DUP LOWER EXTREMITIES BILATERAL VENOUS   Final Result      No evidence for deep vein thrombosis of the lower extremities to the   level of the knee. XR CHEST PORTABLE   Final Result      No evidence for acute cardiopulmonary process. CT ABDOMEN W WO CONTRAST Additional Contrast? Radiologist Recommendation    (Results Pending)       Microbiology:  Pending  No results for input(s): BC in the last 72 hours. No results for input(s): ORG in the last 72 hours. No results for input(s): Earl Lav in the last 72 hours. No results for input(s): STREPNEUMAGU in the last 72 hours. No results for input(s): LP1UAG in the last 72 hours. No results for input(s): ASO in the last 72 hours. No results for input(s): CULTRESP in the last 72 hours.     Assessment:  · Right gluteal celluliits  CT shows no abscess and no air  · Continue wound care and iv vanco and zosyn  · Afebrile   · Will talk to wound care     Plan:    · Cont vanco and zosyn  · Check cultures  · Baseline ESR, CRP  · Monitor labs  · Will follow with you    Thank you for having us see this patient in consultation. I will be discussing this case with the treating physicians.       Electronically signed by Durga Bach MD on 5/29/2020 at 1:46 PM

## 2020-05-30 LAB
ANION GAP SERPL CALCULATED.3IONS-SCNC: 10 MMOL/L (ref 7–16)
BUN BLDV-MCNC: 15 MG/DL (ref 8–23)
CALCIUM SERPL-MCNC: 8.7 MG/DL (ref 8.6–10.2)
CHLORIDE BLD-SCNC: 91 MMOL/L (ref 98–107)
CO2: 29 MMOL/L (ref 22–29)
CORTISOL TOTAL: 3.11 MCG/DL (ref 2.68–18.4)
CREAT SERPL-MCNC: 0.7 MG/DL (ref 0.5–1)
GFR AFRICAN AMERICAN: >60
GFR NON-AFRICAN AMERICAN: >60 ML/MIN/1.73
GLUCOSE BLD-MCNC: 625 MG/DL (ref 74–99)
METER GLUCOSE: 307 MG/DL (ref 74–99)
METER GLUCOSE: 386 MG/DL (ref 74–99)
METER GLUCOSE: 475 MG/DL (ref 74–99)
METER GLUCOSE: >500 MG/DL (ref 74–99)
POTASSIUM SERPL-SCNC: 4.6 MMOL/L (ref 3.5–5)
PROCALCITONIN: 0.15 NG/ML (ref 0–0.08)
SODIUM BLD-SCNC: 130 MMOL/L (ref 132–146)
VANCOMYCIN TROUGH: 13.5 MCG/ML (ref 5–16)

## 2020-05-30 PROCEDURE — 2700000000 HC OXYGEN THERAPY PER DAY

## 2020-05-30 PROCEDURE — 82962 GLUCOSE BLOOD TEST: CPT

## 2020-05-30 PROCEDURE — 6370000000 HC RX 637 (ALT 250 FOR IP): Performed by: INTERNAL MEDICINE

## 2020-05-30 PROCEDURE — 2060000000 HC ICU INTERMEDIATE R&B

## 2020-05-30 PROCEDURE — 82533 TOTAL CORTISOL: CPT

## 2020-05-30 PROCEDURE — 2580000003 HC RX 258: Performed by: FAMILY MEDICINE

## 2020-05-30 PROCEDURE — 94664 DEMO&/EVAL PT USE INHALER: CPT

## 2020-05-30 PROCEDURE — 6360000002 HC RX W HCPCS: Performed by: FAMILY MEDICINE

## 2020-05-30 PROCEDURE — 36415 COLL VENOUS BLD VENIPUNCTURE: CPT

## 2020-05-30 PROCEDURE — 80202 ASSAY OF VANCOMYCIN: CPT

## 2020-05-30 PROCEDURE — 80048 BASIC METABOLIC PNL TOTAL CA: CPT

## 2020-05-30 PROCEDURE — 84145 PROCALCITONIN (PCT): CPT

## 2020-05-30 PROCEDURE — 6370000000 HC RX 637 (ALT 250 FOR IP): Performed by: FAMILY MEDICINE

## 2020-05-30 PROCEDURE — 83835 ASSAY OF METANEPHRINES: CPT

## 2020-05-30 RX ORDER — INSULIN GLARGINE 100 [IU]/ML
80 INJECTION, SOLUTION SUBCUTANEOUS 2 TIMES DAILY
Status: DISCONTINUED | OUTPATIENT
Start: 2020-05-30 | End: 2020-05-31

## 2020-05-30 RX ORDER — INSULIN GLARGINE 100 [IU]/ML
70 INJECTION, SOLUTION SUBCUTANEOUS 2 TIMES DAILY
Status: DISCONTINUED | OUTPATIENT
Start: 2020-05-30 | End: 2020-05-30

## 2020-05-30 RX ADMIN — DIPHENHYDRAMINE HCL 25 MG: 25 TABLET ORAL at 06:10

## 2020-05-30 RX ADMIN — INSULIN LISPRO 20 UNITS: 100 INJECTION, SOLUTION INTRAVENOUS; SUBCUTANEOUS at 11:53

## 2020-05-30 RX ADMIN — MICONAZOLE NITRATE: 20.6 POWDER TOPICAL at 08:08

## 2020-05-30 RX ADMIN — OXYCODONE AND ACETAMINOPHEN 1 TABLET: 5; 325 TABLET ORAL at 14:19

## 2020-05-30 RX ADMIN — PANTOPRAZOLE SODIUM 40 MG: 40 TABLET, DELAYED RELEASE ORAL at 06:11

## 2020-05-30 RX ADMIN — GABAPENTIN 300 MG: 300 CAPSULE ORAL at 21:50

## 2020-05-30 RX ADMIN — INSULIN LISPRO 18 UNITS: 100 INJECTION, SOLUTION INTRAVENOUS; SUBCUTANEOUS at 06:12

## 2020-05-30 RX ADMIN — INSULIN GLARGINE 70 UNITS: 100 INJECTION, SOLUTION SUBCUTANEOUS at 08:03

## 2020-05-30 RX ADMIN — INSULIN LISPRO 25 UNITS: 100 INJECTION, SOLUTION INTRAVENOUS; SUBCUTANEOUS at 16:23

## 2020-05-30 RX ADMIN — PIPERACILLIN AND TAZOBACTAM 3.38 G: 3; .375 INJECTION, POWDER, FOR SOLUTION INTRAVENOUS at 01:10

## 2020-05-30 RX ADMIN — APIXABAN 5 MG: 5 TABLET, FILM COATED ORAL at 21:50

## 2020-05-30 RX ADMIN — TRAZODONE HYDROCHLORIDE 50 MG: 50 TABLET ORAL at 21:50

## 2020-05-30 RX ADMIN — GABAPENTIN 300 MG: 300 CAPSULE ORAL at 08:04

## 2020-05-30 RX ADMIN — DILTIAZEM HYDROCHLORIDE 240 MG: 240 CAPSULE, EXTENDED RELEASE ORAL at 08:07

## 2020-05-30 RX ADMIN — APIXABAN 5 MG: 5 TABLET, FILM COATED ORAL at 08:04

## 2020-05-30 RX ADMIN — OXYCODONE AND ACETAMINOPHEN 1 TABLET: 5; 325 TABLET ORAL at 08:07

## 2020-05-30 RX ADMIN — INSULIN GLARGINE 80 UNITS: 100 INJECTION, SOLUTION SUBCUTANEOUS at 21:52

## 2020-05-30 RX ADMIN — INSULIN LISPRO 20 UNITS: 100 INJECTION, SOLUTION INTRAVENOUS; SUBCUTANEOUS at 21:51

## 2020-05-30 RX ADMIN — VANCOMYCIN HYDROCHLORIDE 1250 MG: 10 INJECTION, POWDER, LYOPHILIZED, FOR SOLUTION INTRAVENOUS at 14:19

## 2020-05-30 RX ADMIN — OMEGA-3-ACID ETHYL ESTERS 2 G: 1 CAPSULE, LIQUID FILLED ORAL at 21:51

## 2020-05-30 RX ADMIN — MICONAZOLE NITRATE: 20.6 POWDER TOPICAL at 21:49

## 2020-05-30 RX ADMIN — OXYCODONE AND ACETAMINOPHEN 1 TABLET: 5; 325 TABLET ORAL at 01:09

## 2020-05-30 RX ADMIN — MONTELUKAST SODIUM 10 MG: 10 TABLET, FILM COATED ORAL at 21:50

## 2020-05-30 RX ADMIN — INSULIN LISPRO 50 UNITS: 100 INJECTION, SOLUTION INTRAVENOUS; SUBCUTANEOUS at 11:54

## 2020-05-30 RX ADMIN — SODIUM CHLORIDE 25 ML: 9 INJECTION, SOLUTION INTRAVENOUS at 06:13

## 2020-05-30 RX ADMIN — OXYCODONE AND ACETAMINOPHEN 1 TABLET: 5; 325 TABLET ORAL at 21:05

## 2020-05-30 RX ADMIN — METOPROLOL SUCCINATE 100 MG: 100 TABLET, EXTENDED RELEASE ORAL at 21:50

## 2020-05-30 RX ADMIN — INSULIN LISPRO 50 UNITS: 100 INJECTION, SOLUTION INTRAVENOUS; SUBCUTANEOUS at 16:27

## 2020-05-30 RX ADMIN — ESCITALOPRAM 20 MG: 10 TABLET, FILM COATED ORAL at 08:07

## 2020-05-30 RX ADMIN — SPIRONOLACTONE 25 MG: 25 TABLET ORAL at 08:07

## 2020-05-30 RX ADMIN — FERROUS SULFATE TAB 325 MG (65 MG ELEMENTAL FE) 325 MG: 325 (65 FE) TAB at 08:07

## 2020-05-30 RX ADMIN — INSULIN LISPRO 40 UNITS: 100 INJECTION, SOLUTION INTRAVENOUS; SUBCUTANEOUS at 06:13

## 2020-05-30 RX ADMIN — OMEGA-3-ACID ETHYL ESTERS 2 G: 1 CAPSULE, LIQUID FILLED ORAL at 08:07

## 2020-05-30 RX ADMIN — ALBUTEROL SULFATE 2.5 MG: 2.5 SOLUTION RESPIRATORY (INHALATION) at 22:02

## 2020-05-30 RX ADMIN — PIPERACILLIN AND TAZOBACTAM 3.38 G: 3; .375 INJECTION, POWDER, FOR SOLUTION INTRAVENOUS at 10:25

## 2020-05-30 RX ADMIN — METOPROLOL SUCCINATE 100 MG: 100 TABLET, EXTENDED RELEASE ORAL at 08:06

## 2020-05-30 RX ADMIN — BUMETANIDE 1 MG: 1 TABLET ORAL at 08:07

## 2020-05-30 RX ADMIN — GABAPENTIN 300 MG: 300 CAPSULE ORAL at 14:19

## 2020-05-30 RX ADMIN — DIPHENHYDRAMINE HCL 25 MG: 25 TABLET ORAL at 18:37

## 2020-05-30 RX ADMIN — BUMETANIDE 1 MG: 1 TABLET ORAL at 21:50

## 2020-05-30 ASSESSMENT — PAIN SCALES - GENERAL
PAINLEVEL_OUTOF10: 5
PAINLEVEL_OUTOF10: 0
PAINLEVEL_OUTOF10: 6
PAINLEVEL_OUTOF10: 2
PAINLEVEL_OUTOF10: 6
PAINLEVEL_OUTOF10: 7
PAINLEVEL_OUTOF10: 8
PAINLEVEL_OUTOF10: 6
PAINLEVEL_OUTOF10: 4

## 2020-05-30 ASSESSMENT — PAIN DESCRIPTION - FREQUENCY
FREQUENCY: CONTINUOUS
FREQUENCY: CONTINUOUS

## 2020-05-30 ASSESSMENT — PAIN DESCRIPTION - PAIN TYPE
TYPE: CHRONIC PAIN
TYPE: CHRONIC PAIN

## 2020-05-30 ASSESSMENT — PAIN DESCRIPTION - ORIENTATION: ORIENTATION: MID

## 2020-05-30 ASSESSMENT — PAIN DESCRIPTION - ONSET
ONSET: ON-GOING
ONSET: ON-GOING

## 2020-05-30 ASSESSMENT — PAIN DESCRIPTION - DESCRIPTORS
DESCRIPTORS: DISCOMFORT;CONSTANT
DESCRIPTORS: CONSTANT;DISCOMFORT;DULL

## 2020-05-30 ASSESSMENT — PAIN DESCRIPTION - PROGRESSION
CLINICAL_PROGRESSION: NOT CHANGED
CLINICAL_PROGRESSION: NOT CHANGED

## 2020-05-30 ASSESSMENT — PAIN DESCRIPTION - LOCATION
LOCATION: COCCYX;LEG
LOCATION: COCCYX

## 2020-05-30 ASSESSMENT — PAIN - FUNCTIONAL ASSESSMENT: PAIN_FUNCTIONAL_ASSESSMENT: PREVENTS OR INTERFERES SOME ACTIVE ACTIVITIES AND ADLS

## 2020-05-30 NOTE — PLAN OF CARE
Problem: Falls - Risk of:  Goal: Will remain free from falls  Description: Will remain free from falls  5/30/2020 1654 by Renzo Wynn RN  Outcome: Met This Shift     Problem: Falls - Risk of:  Goal: Absence of physical injury  Description: Absence of physical injury  5/30/2020 1654 by Renzo Wynn RN  Outcome: Met This Shift     Problem: Skin Integrity:  Goal: Will show no infection signs and symptoms  Description: Will show no infection signs and symptoms  Outcome: Met This Shift     Problem: Skin Integrity:  Goal: Absence of new skin breakdown  Description: Absence of new skin breakdown  5/30/2020 1654 by Renzo Wynn RN  Outcome: Met This Shift     Problem: Pain:  Goal: Control of acute pain  Description: Control of acute pain  5/30/2020 1654 by Renzo Wynn RN  Outcome: Met This Shift

## 2020-05-30 NOTE — PROGRESS NOTES
[address] [place] [address] [facility]  SUZANNE  Progress Note    SUBJECTIVE:  Chief Complaint   Patient presents with    Wound Check     wound on coccyx that has been increasingly painful. pt states she thinks it is infected. Patient is seen and examined at bedside. She is set up to eat lunch. She reports mild discomfort to the buttock area. She denies any fevers or chills. Patient is tolerating medications. No reported adverse drug reactions. No nausea, vomiting, diarrhea. Afebrile    Review of systems:  As stated above in the chief complaint, otherwise negative.     Medications:  Scheduled Meds:   insulin glargine  70 Units Subcutaneous BID    insulin lispro  0-30 Units Subcutaneous TID WC    insulin lispro  50 Units Subcutaneous TID WC    miconazole   Topical BID    piperacillin-tazobactam  3.375 g Intravenous Q8H    And    sodium chloride  25 mL Intravenous Q8H    apixaban  5 mg Oral BID    bumetanide  1 mg Oral BID    dilTIAZem  240 mg Oral Daily    escitalopram  20 mg Oral Daily    ferrous sulfate  325 mg Oral Daily with breakfast    gabapentin  300 mg Oral TID    pantoprazole  40 mg Oral QAM AC    metoprolol succinate  100 mg Oral BID    montelukast  10 mg Oral Nightly    spironolactone  25 mg Oral Daily    vitamin D  50,000 Units Oral Weekly    vancomycin  1,250 mg Intravenous Q12H    omega-3 acid ethyl esters  2 g Oral BID    insulin lispro  0-9 Units Subcutaneous Nightly     Continuous Infusions:   dextrose       PRN Meds:glucose, dextrose, glucagon (rDNA), dextrose, oxyCODONE-acetaminophen, acetaminophen, albuterol, magic (miracle) mouthwash, traZODone, diphenhydrAMINE    OBJECTIVE:  /64   Pulse 94   Temp 97.6 °F (36.4 °C) (Temporal)   Resp 18   Ht 5' 3\" (1.6 m)   Wt 250 lb (113.4 kg)   LMP  (LMP Unknown)   SpO2 98%   BMI 44.29 kg/m²   Temp  Av °F (36.1 °C)  Min: 96 °F (35.6 °C)  Max: 97.7 °F (36.5 °C)  Constitutional: The patient is awake, alert, and oriented. Skin: Warm and dry. No rashes were noted. --- Right gluteal cellulitis/wound  HEENT: Round and reactive pupils. Moist mucous membranes. No ulcerations or thrush. Neck: Supple to movements. Chest: No use of accessory muscles to breathe. Symmetrical expansion. No wheezing, crackles or rhonchi. Cardiovascular: S1 and S2 are rhythmic and regular. No murmurs appreciated. Abdomen: Positive bowel sounds to auscultation. Benign to palpation. No masses felt. No hepatosplenomegaly.   Extremities: See pic below----bilateral lower extremity wrapped with dressing  Lines: peripheral                    Laboratory and Tests Review:  Lab Results   Component Value Date    WBC 7.7 05/29/2020    WBC 8.4 05/28/2020    WBC 9.1 05/26/2020    HGB 8.3 (L) 05/29/2020    HCT 26.8 (L) 05/29/2020    MCV 83.2 05/29/2020     05/29/2020     Lab Results   Component Value Date    NEUTROABS 5.72 05/29/2020    NEUTROABS 6.60 05/28/2020    NEUTROABS 7.77 (H) 05/26/2020     No results found for: [facility]  Lab Results   Component Value Date    ALT 6 05/28/2020    AST 8 05/28/2020    ALKPHOS 109 (H) 05/28/2020    BILITOT <0.2 05/28/2020     Lab Results   Component Value Date     05/30/2020    K 4.6 05/30/2020    K 5.4 01/19/2020    CL 91 05/30/2020    CO2 29 05/30/2020    BUN 15 05/30/2020    CREATININE 0.7 05/30/2020    CREATININE 0.6 05/29/2020    CREATININE 0.8 05/28/2020    GFRAA >60 05/30/2020    LABGLOM >60 05/30/2020    GLUCOSE 625 05/30/2020    GLUCOSE 181 12/16/2011    PROT 7.1 05/28/2020    LABALBU 3.4 05/28/2020    LABALBU 4.5 11/02/2011    CALCIUM 8.7 05/30/2020    BILITOT <0.2 05/28/2020    ALKPHOS 109 05/28/2020    AST 8 05/28/2020    ALT 6 05/28/2020     Lab Results   Component Value Date    CRP 14.0 (H) 05/29/2020    CRP 2.2 (H) 12/24/2019    CRP 2.0 (H) 09/22/2019     Lab Results   Component Value Date    SEDRATE 75 (H) 05/29/2020    SEDRATE 41 (H) 12/24/2019    SEDRATE 38 (H) 09/22/2019 Radiology:  Noted    Microbiology:   Blood cultures 5/28/2020 20-24 hours no growth    Assessment:  · Right gluteal celluliits  CT shows no abscess and no air  · Chronic right buttock wound  · History of anal fissure  · Chronic lymphedema  · Bilateral lower extremity chronic skin changes/lymphedema  · Afebrile        Plan:    · Cont vanco and zosyn  · Pharmacy to dose vancomycin  · Check cultures  · Baseline ESR-75, CRP-14 (05/30/2020)  · Continue local wound care  · Wound care help appreciated  · Monitor labs  · Will follow with you  · I looked at the wound myself   Even though the xray did not show an abscess or air  Would consult surgery to lay hands on     RALU Sumner - CNP   5/30/2020     Pt seen and examined. Above discussed agree with advanced practice nurse. Labs, cultures, and radiographs reviewed. Face to Face encounter occurred. Changes made as necessary.      Dima Joseph MD

## 2020-05-30 NOTE — PROGRESS NOTES
Hospitalist Progress Note      SYNOPSIS:   Briefly this is a 66-year-old female with a chronic right buttock wound, history of anal fissure, atrial fibrillation, COPD, diabetes mellitus, hypertension, hyperlipidemia, chronic lymphadenitis who presents to the emergency department at [facility] for evaluation of chronic coccyx wound with increasing pain and infection  Patient reports she was on clindamycin until May 26 which was prescribed by her primary care doctor. She reports increased discomfort and pain in the right gluteal region. Patient reports having some fevers and chills and worsening pain around the perirectal area. She reports she has been checking her blood sugars and taking her insulin as prescribed. She also reports she is on Eliquis. She does have chronic lymphedema and has pressure dressings in place. SUBJECTIVE:    Patient seen and examined  Records reviewed. She appears very comfortable this morning  She has no complaints of fevers, chest pain, shortness of breath    Stable overnight. No other overnight issues reported. Temp (24hrs), Av °F (36.1 °C), Min:96 °F (35.6 °C), Max:97.7 °F (36.5 °C)    DIET: DIET CARB CONTROL; Carb Control: 4 carb choices (60 gms)/meal  Dietary Nutrition Supplements: Low Calorie High Protein Supplement  Dietary Nutrition Supplements: Wound Healing Oral Supplement  CODE: Prior    Intake/Output Summary (Last 24 hours) at 2020 1023  Last data filed at 2020 0623  Gross per 24 hour   Intake 1130 ml   Output 2500 ml   Net -1370 ml       OBJECTIVE:    /64   Pulse 94   Temp 97.6 °F (36.4 °C) (Temporal)   Resp 18   Ht 5' 3\" (1.6 m)   Wt 250 lb (113.4 kg)   LMP  (LMP Unknown)   SpO2 98%   BMI 44.29 kg/m²     General appearance: No apparent distress, appears stated age and cooperative. HEENT:  Conjunctivae/corneas clear. Neck: Supple, with full range of motion. No jugular venous distention.  Trachea midline. Respiratory: Diminished breath sounds bilaterally without wheezes rales or rhonchi. No respiratory distress. Cardiovascular: Regular rate rhythm with normal S1-S2. She does have chronic lymphedema of bilateral lower extremities. Abdomen: Soft, nontender, nondistended  Musculoskeletal: No clubbing, cyanosis, 3+ swelling bilateral lower extremity, old dressing over with chronic skin changes that appear like tree bark. Brisk capillary refill. Skin: Intertrigo under abdominal pannus. Abscess noted in the right buttock. This is status post I&D by emergency physician on 5/29/2020   Skin:  No rashes  on visible skin  Neurologic: awake, alert and following commands     ASSESSMENT:     Right gluteal buttock abscess  Ambulatory dysfunction  Hyperglycemia  Protein calorie malnutrition, moderate  Chronic microcytic anemia  Very uncontrolled diabetes mellitus, as evidenced by an A1c of 17 (?)  Chronic diastolic heart failure  COPD, stable  Gastroesophageal reflux disease  Hypertension  Hyperlipidemia  History of sinus tachycardia  Chronic lymphadenitis  Anxiety and depression  Right adrenal gland     Doppler ultrasound of bilateral lower extremity negative for DVT     CT abdomen pelvis with intermittent cellulitis of the septae none soft tissue of the more posterior inferior medial aspect of right gluteal region. No conspicuous extension, no obvious abscess formation.     CT adrenal per Endo     PLAN:   Dex suppression test per endocrinology; discussed with specialists  C/w vancomycin and cefepime for gluteal abscess  Appreciate ID consultation, reviewed procalcitonin, follow blood cultures, check ESR CRP, wound care eval  Continue with intertrigo treatment with Micatin powder  Continue metoprolol and Cardizem  Continue Lantus 40 mg twice daily  Will defer insulin management to endocrinology.     Bumex 1 mg 2 times daily at home dose  Appreciate consultant support    DISPOSITION: [address] highly suggested    Medications:  REVIEWED DAILY    Infusion Medications    dextrose       Scheduled Medications    insulin glargine  70 Units Subcutaneous BID    insulin lispro  0-30 Units Subcutaneous TID WC    insulin lispro  50 Units Subcutaneous TID WC    miconazole   Topical BID    piperacillin-tazobactam  3.375 g Intravenous Q8H    And    sodium chloride  25 mL Intravenous Q8H    apixaban  5 mg Oral BID    bumetanide  1 mg Oral BID    dilTIAZem  240 mg Oral Daily    escitalopram  20 mg Oral Daily    ferrous sulfate  325 mg Oral Daily with breakfast    gabapentin  300 mg Oral TID    pantoprazole  40 mg Oral QAM AC    metoprolol succinate  100 mg Oral BID    montelukast  10 mg Oral Nightly    spironolactone  25 mg Oral Daily    vitamin D  50,000 Units Oral Weekly    vancomycin  1,250 mg Intravenous Q12H    omega-3 acid ethyl esters  2 g Oral BID    insulin lispro  0-9 Units Subcutaneous Nightly     PRN Meds: glucose, dextrose, glucagon (rDNA), dextrose, oxyCODONE-acetaminophen, acetaminophen, albuterol, magic (miracle) mouthwash, traZODone, diphenhydrAMINE    Labs:     Recent Labs     05/28/20 2232 05/29/20  0659   WBC 8.4 7.7   HGB 8.6* 8.3*   HCT 28.7* 26.8*    278       Recent Labs     05/28/20 2232 05/29/20  0659 05/30/20  0645    133 130*   K 4.1 4.2 4.6   CL 94* 96* 91*   CO2 26 23 29   BUN 17 13 15   CREATININE 0.8 0.6 0.7   CALCIUM 8.8 8.3* 8.7       Recent Labs     05/28/20 2232   PROT 7.1   ALKPHOS 109*   ALT 6   AST 8   BILITOT <0.2       No results for input(s): INR in the last 72 hours.     Recent Labs     05/28/20 2232   TROPONINI <0.01       Chronic labs:    Lab Results   Component Value Date    CHOL 287 (H) 05/26/2020    TRIG 512 (H) 05/26/2020    HDL 33 05/26/2020    LDLCALC - (AA) 05/26/2020    TSH 2.240 05/26/2020    INR 1.5 11/27/2018    LABA1C 17.1 (H) 05/26/2020       Radiology: REVIEWED DAILY    +++++++++++++++++++++++++++++++++++++++++++++++++  Samira Hunt [address] [place], [place]  +++++++++++++++++++++++++++++++++++++++++++++++++  NOTE: This report was transcribed using voice recognition software. Every effort was made to ensure accuracy; however, inadvertent computerized transcription errors may be present.

## 2020-05-30 NOTE — PLAN OF CARE
Problem: Falls - Risk of:  Goal: Will remain free from falls  Description: Will remain free from falls  5/30/2020 0620 by Flora Ahumada, RN  Outcome: Met This Shift  5/29/2020 1651 by Lc Chin RN  Outcome: Met This Shift  Goal: Absence of physical injury  Description: Absence of physical injury  5/30/2020 0620 by Flora Ahumada, RN  Outcome: Met This Shift  5/29/2020 1651 by Lc Chin RN  Outcome: Met This Shift     Problem: Skin Integrity:  Goal: Absence of new skin breakdown  Description: Absence of new skin breakdown  5/30/2020 0620 by Flora Ahumada, RN  Outcome: Met This Shift  5/29/2020 1651 by Lc Chin RN  Outcome: Met This Shift     Problem: Pain:  Goal: Pain level will decrease  Description: Pain level will decrease  5/29/2020 1651 by Lc Chin RN  Outcome: Met This Shift  Goal: Control of acute pain  Description: Control of acute pain  5/30/2020 0620 by Flora Ahumada, RN  Outcome: Met This Shift  5/29/2020 1651 by Lc Chin RN  Outcome: Met This Shift

## 2020-05-30 NOTE — PROGRESS NOTES
Dr. Matt Lindsay notified Patient high blood sugar.          Results of gluc 625 sent to Dr Matt Lindsay

## 2020-05-30 NOTE — PROGRESS NOTES
Endocrine Brief Communication Note     I have reviewed lab, imaging results blood glucose trend & insulin requirement over the past 24 hrs    DM type 2:  · Profoundly uncontrolled due to combination of high insulin resistance and poor compliance with diabetic diet  · Patient was eating snacks all night    · Will change insulin regimen to:  · Will give one dose of lantus 80 units BID   · Humalog 50 units with meals  · Modified high dose sliding scale with 5 units increment  · will arrange for the patient to be seen by our inpatient diabetes education team for further teaching on consistent Carb diet     Rt adrenal mass   · CT adrenal  5/30/2020 --> high Hounsfield units with negative washout makings lesion undetermined. · Given size change, this lesion will need biopsy but we have to r/o Pheo first especially with high Hounsfield units.  Plasma metanephrine was ordered, will follow the result       Rayo Curran MD  Endocrinologist, [facility]   [address] [facility], [address] [place] [address],[phone] [id number]   Phone: [phone]  Fax: [phone]

## 2020-05-30 NOTE — PROGRESS NOTES
Pharmacy Consultation Note  (Antibiotic Dosing and Monitoring)    Initial consult date: 2020  Consulting physician: Dr. Shahid Ramirez  Drug(s): vancomycin  Indication: CSSSI (coccyx/gluteal infection/abscess)  Age/Gender IBW DW  Allergy Information   64 y.o./F; 160 cm, 113.4 kg 52.9 kg 77.1 kg  Statins             Date  WBC BUN/CR Drug/Dose Time   Given Level(s)   (Time) Comments        7.7   13/0.6 vancomycin 1500 mg x1    vanco 1250 mg q12h 0408    1636      -- 15/0.7 vanco 1250 mg q 12 hr  not given    (0900)  (2100)         (0900) Vanco trough at (0830)               Estimated Creatinine Clearance: 96 mL/min (based on SCr of 0.7 mg/dL). Intake/Output Summary (Last 24 hours) at 2020 0827  Last data filed at 2020 8933  Gross per 24 hour   Intake 1130 ml   Output 3700 ml   Net -2570 ml       Temp max: Temp (24hrs), Av.3 °F (36.3 °C), Min:96 °F (35.6 °C), Max:98.6 °F (37 °C)      Cultures:  available culture and sensitivity results were reviewed in [facility]      Assessment:  · Consulted by Dr. Shahid Ramirez to dose/monitor vancomycin. · Goal trough level:  15-20 mCg/mL. · 63 yo/F admitted from home for coccyx wound infection/abscess. Started on PO clindamycin as OP on . · Empiric ATBs (vanco and cefepime) started in ED; cefepime changed to pip-tazo on admission. · Fluconazole started for vaginal yeast infection. · ID has been consulted. · : Scr 0.7, patient afebrile. Medication not given at 0600, I re-timed medication, and RN said she would give. Plan:  · Cont vancomycin 1250 mg q12h. · Will check vancomycin trough level when steady state is attained if vanco continues and if Pharmacy is to continue the consult. · Pharmacist will follow and monitor/adjust dosing as necessary.     Pradeep Moody, PharmD, BCPS 2020 8:51 AM

## 2020-05-31 LAB
ABO/RH: NORMAL
ANTIBODY SCREEN: NORMAL
INR BLD: 1.1
METER GLUCOSE: 157 MG/DL (ref 74–99)
METER GLUCOSE: 265 MG/DL (ref 74–99)
METER GLUCOSE: 373 MG/DL (ref 74–99)
METER GLUCOSE: 77 MG/DL (ref 74–99)
PROTHROMBIN TIME: 12.4 SEC (ref 9.3–12.4)
VANCOMYCIN TROUGH: 5.9 MCG/ML (ref 5–16)

## 2020-05-31 PROCEDURE — 85610 PROTHROMBIN TIME: CPT

## 2020-05-31 PROCEDURE — 6370000000 HC RX 637 (ALT 250 FOR IP): Performed by: INTERNAL MEDICINE

## 2020-05-31 PROCEDURE — 2500000003 HC RX 250 WO HCPCS: Performed by: SURGERY

## 2020-05-31 PROCEDURE — 6370000000 HC RX 637 (ALT 250 FOR IP): Performed by: FAMILY MEDICINE

## 2020-05-31 PROCEDURE — 0J990ZZ DRAINAGE OF BUTTOCK SUBCUTANEOUS TISSUE AND FASCIA, OPEN APPROACH: ICD-10-PCS | Performed by: SURGERY

## 2020-05-31 PROCEDURE — 2580000003 HC RX 258: Performed by: FAMILY MEDICINE

## 2020-05-31 PROCEDURE — 36415 COLL VENOUS BLD VENIPUNCTURE: CPT

## 2020-05-31 PROCEDURE — 86900 BLOOD TYPING SEROLOGIC ABO: CPT

## 2020-05-31 PROCEDURE — 80202 ASSAY OF VANCOMYCIN: CPT

## 2020-05-31 PROCEDURE — 82962 GLUCOSE BLOOD TEST: CPT

## 2020-05-31 PROCEDURE — 86901 BLOOD TYPING SEROLOGIC RH(D): CPT

## 2020-05-31 PROCEDURE — 6360000002 HC RX W HCPCS: Performed by: FAMILY MEDICINE

## 2020-05-31 PROCEDURE — 2060000000 HC ICU INTERMEDIATE R&B

## 2020-05-31 PROCEDURE — 2700000000 HC OXYGEN THERAPY PER DAY

## 2020-05-31 PROCEDURE — 86850 RBC ANTIBODY SCREEN: CPT

## 2020-05-31 PROCEDURE — 99253 IP/OBS CNSLTJ NEW/EST LOW 45: CPT | Performed by: SURGERY

## 2020-05-31 RX ORDER — LIDOCAINE HYDROCHLORIDE AND EPINEPHRINE 10; 10 MG/ML; UG/ML
20 INJECTION, SOLUTION INFILTRATION; PERINEURAL ONCE
Status: COMPLETED | OUTPATIENT
Start: 2020-05-31 | End: 2020-05-31

## 2020-05-31 RX ADMIN — BUMETANIDE 1 MG: 1 TABLET ORAL at 08:16

## 2020-05-31 RX ADMIN — GABAPENTIN 300 MG: 300 CAPSULE ORAL at 13:12

## 2020-05-31 RX ADMIN — ACETAMINOPHEN 650 MG: 325 TABLET, FILM COATED ORAL at 01:14

## 2020-05-31 RX ADMIN — OMEGA-3-ACID ETHYL ESTERS 2 G: 1 CAPSULE, LIQUID FILLED ORAL at 21:12

## 2020-05-31 RX ADMIN — MONTELUKAST SODIUM 10 MG: 10 TABLET, FILM COATED ORAL at 21:11

## 2020-05-31 RX ADMIN — VANCOMYCIN HYDROCHLORIDE 1250 MG: 10 INJECTION, POWDER, LYOPHILIZED, FOR SOLUTION INTRAVENOUS at 21:15

## 2020-05-31 RX ADMIN — OXYCODONE AND ACETAMINOPHEN 1 TABLET: 5; 325 TABLET ORAL at 14:43

## 2020-05-31 RX ADMIN — ESCITALOPRAM 20 MG: 10 TABLET, FILM COATED ORAL at 08:16

## 2020-05-31 RX ADMIN — FERROUS SULFATE TAB 325 MG (65 MG ELEMENTAL FE) 325 MG: 325 (65 FE) TAB at 08:17

## 2020-05-31 RX ADMIN — PANTOPRAZOLE SODIUM 40 MG: 40 TABLET, DELAYED RELEASE ORAL at 07:02

## 2020-05-31 RX ADMIN — OXYCODONE AND ACETAMINOPHEN 1 TABLET: 5; 325 TABLET ORAL at 08:17

## 2020-05-31 RX ADMIN — APIXABAN 5 MG: 5 TABLET, FILM COATED ORAL at 08:28

## 2020-05-31 RX ADMIN — PIPERACILLIN AND TAZOBACTAM 3.38 G: 3; .375 INJECTION, POWDER, FOR SOLUTION INTRAVENOUS at 21:16

## 2020-05-31 RX ADMIN — MICONAZOLE NITRATE: 20.6 POWDER TOPICAL at 08:27

## 2020-05-31 RX ADMIN — INSULIN HUMAN 100 UNITS: 500 INJECTION, SOLUTION SUBCUTANEOUS at 09:44

## 2020-05-31 RX ADMIN — BUMETANIDE 1 MG: 1 TABLET ORAL at 21:11

## 2020-05-31 RX ADMIN — SODIUM CHLORIDE 25 ML: 9 INJECTION, SOLUTION INTRAVENOUS at 16:32

## 2020-05-31 RX ADMIN — DILTIAZEM HYDROCHLORIDE 240 MG: 240 CAPSULE, EXTENDED RELEASE ORAL at 08:14

## 2020-05-31 RX ADMIN — OXYCODONE AND ACETAMINOPHEN 1 TABLET: 5; 325 TABLET ORAL at 21:40

## 2020-05-31 RX ADMIN — PIPERACILLIN AND TAZOBACTAM 3.38 G: 3; .375 INJECTION, POWDER, FOR SOLUTION INTRAVENOUS at 02:10

## 2020-05-31 RX ADMIN — SPIRONOLACTONE 25 MG: 25 TABLET ORAL at 08:29

## 2020-05-31 RX ADMIN — METOPROLOL SUCCINATE 100 MG: 100 TABLET, EXTENDED RELEASE ORAL at 08:18

## 2020-05-31 RX ADMIN — VANCOMYCIN HYDROCHLORIDE 1250 MG: 10 INJECTION, POWDER, LYOPHILIZED, FOR SOLUTION INTRAVENOUS at 11:17

## 2020-05-31 RX ADMIN — OMEGA-3-ACID ETHYL ESTERS 2 G: 1 CAPSULE, LIQUID FILLED ORAL at 08:14

## 2020-05-31 RX ADMIN — PIPERACILLIN AND TAZOBACTAM 3.38 G: 3; .375 INJECTION, POWDER, FOR SOLUTION INTRAVENOUS at 11:17

## 2020-05-31 RX ADMIN — GABAPENTIN 300 MG: 300 CAPSULE ORAL at 08:17

## 2020-05-31 RX ADMIN — GABAPENTIN 300 MG: 300 CAPSULE ORAL at 21:11

## 2020-05-31 RX ADMIN — LIDOCAINE HYDROCHLORIDE AND EPINEPHRINE 20 ML: 10; 10 INJECTION, SOLUTION INFILTRATION; PERINEURAL at 19:06

## 2020-05-31 RX ADMIN — MICONAZOLE NITRATE: 20.6 POWDER TOPICAL at 21:17

## 2020-05-31 RX ADMIN — INSULIN LISPRO 25 UNITS: 100 INJECTION, SOLUTION INTRAVENOUS; SUBCUTANEOUS at 07:02

## 2020-05-31 RX ADMIN — METOPROLOL SUCCINATE 100 MG: 100 TABLET, EXTENDED RELEASE ORAL at 21:12

## 2020-05-31 ASSESSMENT — PAIN SCALES - GENERAL
PAINLEVEL_OUTOF10: 8
PAINLEVEL_OUTOF10: 2
PAINLEVEL_OUTOF10: 8
PAINLEVEL_OUTOF10: 6
PAINLEVEL_OUTOF10: 8
PAINLEVEL_OUTOF10: 7
PAINLEVEL_OUTOF10: 5
PAINLEVEL_OUTOF10: 3

## 2020-05-31 ASSESSMENT — PAIN DESCRIPTION - ONSET
ONSET: ON-GOING
ONSET: ON-GOING

## 2020-05-31 ASSESSMENT — PAIN DESCRIPTION - PROGRESSION
CLINICAL_PROGRESSION: GRADUALLY WORSENING
CLINICAL_PROGRESSION: GRADUALLY WORSENING

## 2020-05-31 ASSESSMENT — PAIN DESCRIPTION - PAIN TYPE
TYPE: ACUTE PAIN
TYPE: ACUTE PAIN

## 2020-05-31 ASSESSMENT — PAIN DESCRIPTION - DESCRIPTORS
DESCRIPTORS: ACHING;DISCOMFORT;DULL
DESCRIPTORS: ACHING;DISCOMFORT;DULL

## 2020-05-31 ASSESSMENT — PAIN DESCRIPTION - ORIENTATION
ORIENTATION: LEFT;RIGHT
ORIENTATION: RIGHT;LEFT

## 2020-05-31 ASSESSMENT — PAIN DESCRIPTION - LOCATION
LOCATION: LEG
LOCATION: BACK;BUTTOCKS
LOCATION: BACK;BUTTOCKS

## 2020-05-31 ASSESSMENT — PAIN DESCRIPTION - FREQUENCY
FREQUENCY: CONTINUOUS
FREQUENCY: CONTINUOUS

## 2020-05-31 NOTE — PROGRESS NOTES
Hospitalist Progress Note      SYNOPSIS:   Briefly this is a 78-year-old female with a chronic right buttock wound, history of anal fissure, atrial fibrillation, COPD, diabetes mellitus, hypertension, hyperlipidemia, chronic lymphadenitis who presents to the emergency department at [facility] for evaluation of chronic coccyx wound with increasing pain and infection  Patient reports she was on clindamycin until May 26 which was prescribed by her primary care doctor. She reports increased discomfort and pain in the right gluteal region. Patient reports having some fevers and chills and worsening pain around the perirectal area. She reports she has been checking her blood sugars and taking her insulin as prescribed. She also reports she is on Eliquis. She does have chronic lymphedema and has pressure dressings in place. SUBJECTIVE:    Patient seen and examined  Records reviewed. She has no complaints this morning  Blood sugars Remain elevated      Stable overnight. No other overnight issues reported. Temp (24hrs), Av.3 °F (36.3 °C), Min:97.1 °F (36.2 °C), Max:97.5 °F (36.4 °C)    DIET: DIET CARB CONTROL; Carb Control: 4 carb choices (60 gms)/meal  Dietary Nutrition Supplements: Low Calorie High Protein Supplement  Dietary Nutrition Supplements: Wound Healing Oral Supplement  CODE: Prior    Intake/Output Summary (Last 24 hours) at 2020 1202  Last data filed at 2020 1113  Gross per 24 hour   Intake 1480 ml   Output 5150 ml   Net -3670 ml       OBJECTIVE:    /64   Pulse 83   Temp 97.2 °F (36.2 °C) (Temporal)   Resp 17   Ht 5' 3\" (1.6 m)   Wt 250 lb (113.4 kg)   LMP  (LMP Unknown)   SpO2 95%   BMI 44.29 kg/m²     General appearance: No apparent distress, appears stated age and cooperative. HEENT:  Conjunctivae/corneas clear. Neck: Supple, with full range of motion. No jugular venous distention. Trachea midline.   Respiratory: Diminished breath sounds bilaterally without wheezes rales or rhonchi. No respiratory distress. Cardiovascular: Regular rate rhythm with normal S1-S2. She does have chronic lymphedema of bilateral lower extremities. Abdomen: Soft, nontender, nondistended  Musculoskeletal: No clubbing, cyanosis, 3+ swelling bilateral lower extremity, old dressing over with chronic skin changes that appear like tree bark. Brisk capillary refill. Skin: Intertrigo under abdominal pannus. Abscess noted in the right buttock. This is status post I&D by emergency physician on 5/29/2020   Skin:  No rashes  on visible skin  Neurologic: awake, alert and following commands     ASSESSMENT:     Right gluteal buttock abscess  Ambulatory dysfunction  Hyperglycemia  Protein calorie malnutrition, moderate  Chronic microcytic anemia  Very uncontrolled diabetes mellitus, as evidenced by an A1c of 17 (?)  Chronic diastolic heart failure  COPD, stable  Gastroesophageal reflux disease  Hypertension  Hyperlipidemia  History of sinus tachycardia  Chronic lymphadenitis  Anxiety and depression  Right adrenal gland     Doppler ultrasound of bilateral lower extremity negative for DVT     CT abdomen pelvis with intermittent cellulitis of the septae none soft tissue of the more posterior inferior medial aspect of right gluteal region. No conspicuous extension, no obvious abscess formation.     CT adrenal per Endo     PLAN:   Dex suppression test per endocrinology; discussed with specialists  C/w vancomycin and cefepime for gluteal abscess  Appreciate ID consultation, reviewed procalcitonin, follow blood cultures. Recommend general surgery consult for wound evaluation  Continue with intertrigo treatment with Micatin powder  Continue metoprolol and Cardizem  Orders 4500 noted per endocrinology. 100 units with meals  Will defer insulin management to endocrinology. Recommending outpatient biopsy.   Bumex 1 mg 2 times daily at home dose  Appreciate consultant support    DISPOSITION: Home Health highly suggested    Medications:  REVIEWED DAILY    Infusion Medications    dextrose       Scheduled Medications    insulin regular human  100 Units Subcutaneous TID WC    miconazole   Topical BID    piperacillin-tazobactam  3.375 g Intravenous Q8H    And    sodium chloride  25 mL Intravenous Q8H    apixaban  5 mg Oral BID    bumetanide  1 mg Oral BID    dilTIAZem  240 mg Oral Daily    escitalopram  20 mg Oral Daily    ferrous sulfate  325 mg Oral Daily with breakfast    gabapentin  300 mg Oral TID    pantoprazole  40 mg Oral QAM AC    metoprolol succinate  100 mg Oral BID    montelukast  10 mg Oral Nightly    spironolactone  25 mg Oral Daily    vitamin D  50,000 Units Oral Weekly    vancomycin  1,250 mg Intravenous Q12H    omega-3 acid ethyl esters  2 g Oral BID     PRN Meds: glucose, dextrose, glucagon (rDNA), dextrose, oxyCODONE-acetaminophen, acetaminophen, albuterol, magic (miracle) mouthwash, traZODone, diphenhydrAMINE    Labs:     Recent Labs     05/28/20 2232 05/29/20  0659   WBC 8.4 7.7   HGB 8.6* 8.3*   HCT 28.7* 26.8*    278       Recent Labs     05/28/20 2232 05/29/20  0659 05/30/20  0645    133 130*   K 4.1 4.2 4.6   CL 94* 96* 91*   CO2 26 23 29   BUN 17 13 15   CREATININE 0.8 0.6 0.7   CALCIUM 8.8 8.3* 8.7       Recent Labs     05/28/20 2232   PROT 7.1   ALKPHOS 109*   ALT 6   AST 8   BILITOT <0.2       No results for input(s): INR in the last 72 hours.     Recent Labs     05/28/20 2232   TROPONINI <0.01       Chronic labs:    Lab Results   Component Value Date    CHOL 287 (H) 05/26/2020    TRIG 512 (H) 05/26/2020    HDL 33 05/26/2020    LDLCALC - (AA) 05/26/2020    TSH 2.240 05/26/2020    INR 1.5 11/27/2018    LABA1C 17.1 (H) 05/26/2020       Radiology: REVIEWED DAILY    +++++++++++++++++++++++++++++++++++++++++++++++++  Mitch Canchola [place]  +++++++++++++++++++++++++++++++++++++++++++++++++  NOTE: This report was transcribed using voice recognition software. Every effort was made to ensure accuracy; however, inadvertent computerized transcription errors may be present.

## 2020-05-31 NOTE — CONSULTS
GENERAL SURGERY  CONSULT NOTE            Date: 5/31/2020        Patient Name: Mindy Mario     YOB: 1955      Age:  75 y.o. Consult by: Dr Keller Sender to: Dr Lo Tong  Reason:  gluteus    Chief Complaint     Chief Complaint   Patient presents with    Wound Check     wound on coccyx that has been increasingly painful. pt states she thinks it is infected. History Obtained From   patient    History of Present Illness   Mindy Mario is a 75 y.o. female with uncontrolled diabetes (noncompliant with home insulin), eliquis for unprovoked LLE DVT (2018), paroxysmal afib, BLE lymphedema (for 1.5 yrs), COPD (4LNC home O2), CHF, and HTN who presents for increasing R buttock pain, started 1 week ago. Was taking clindamycin per her PCP but pain worsening, so she presented to ED and had I&D with minimal output per ED note. Patient denies any fevers/chills (except mild subjective hot/cold sensations) over the entire week, and no recorded fevers here. She has been on IV antibiotics for last 3 days but her pain is the same. Denies any prior history of abscesses/wound issues, but she has not been using her insulin lately.     Past Medical History     Past Medical History:   Diagnosis Date    Anal fissure     Anemia 10/6/2017    Anxiety and depression     Arthritis     Asthma     Atrial fibrillation (HCC)     Eliquis    Blood transfusion     long time no reaction    CHF (congestive heart failure) (HCC)     Diastolic    [facility]chronic obstructive pulmonary disease) ([facility]     Disc disorder     DM2 (diabetes mellitus, type 2) ([facility] [address]     on insulin    Fall due to stumbling 10/6/2017    GERD (gastroesophageal reflux disease)     Hx of blood clots     Hyperlipidemia     Hypertension     Inappropriate sinus tachycardia     LVH (left ventricular hypertrophy)     moderate    Lymphadenitis, chronic 4/13/2018    Occipital neuralgia 11/2/2011    Respiratory acidosis 10/7/2017    Sinus tachycardia 2/16/2015        Past Surgical History     Past Surgical History:   Procedure Laterality Date    ANOSCOPY  10/25/2006    injection of anal sphincter with Botox, Franklyn Ortiz/Timmy, [facility]    ANOSCOPY  4/9/2007    injection of anal sphincter with Botox, Dr. Kelly Merlin, [facility] ANOSCOPY  6/13/2007    injection of anal sphincter with Botox, Franklyn Holloway [facility]    ANUS SURGERY  4/4/2006    Lateral sphincterotomy for chronic anal fissure, Dr. Ayla Taveras, [facility]  4/18/2012    anal exam and injection of Botox 100 units into anal sphincter, Laila Holloway, [facility]    APPENDECTOMY  1965   [address]    COLONOSCOPY  1/20/2004    cecal polyp, bx (no pathologic changes), Dr. Romana Whiting, [facility] COLONOSCOPY  8/11/2006    snare polypectomy terminal ileum polyp (granulation tissue polyp) and anal polyp (inflammatory/papilla), Dr. Kelly Merlin, [facility] COLONOSCOPY  3/23/2012    bx/cauterization proximal ascending colon polyp, injection of anal sphincter with Botox, Dr. Kelly Merlin, [address] REPLACEMENT  2003 [facility], SKIN, SUB-Q TISSUE,MUSCLE,=<20 SQ CM Left 11/30/2018    LEFT LEG EXCISIONAL  DEBRIDEMENT WITH TISSUE BIOPSY performed by Haresh Nevarez DPM at [address] [facility] ENDOSCOPY  1/20/2004    gastritis, bx (no H pylori), Dr. Romana Whiting, [address] ENDOSCOPY N/A 6/1/2018    EGD BIOPSY performed by Vinnie Darby MD at [address] [facility] 11/9/2018    EGD BIOPSY performed by Vinnie Darby MD at [facility] ENDOSCOPY        Medications - Prior to Admission and Current Meds     Prior to Admission medications    Medication Sig Start Date End Date Taking?  Authorizing Provider   Magic Mouthwash (MIRACLE MOUTHWASH) Swish and spit 5 mLs 4 times daily as needed for Irritation 5/28/20  Yes Kimberly Abt, DO   gabapentin (NEURONTIN) 300 MG capsule Take 1 capsule by mouth 3 times daily for 30 days. 5/28/20 6/27/20 Yes Rosana Dempsey,    insulin glargine (LANTUS) 100 UNIT/ML injection vial Inject 40 Units into the skin 2 times daily 5/27/20  Yes Jennifer Alvarez DO   insulin lispro (HUMALOG) 100 UNIT/ML injection vial INJECT 30 UNITS INTO THE SKIN 3 TIMES DAILY (BEFORE MEALS) 5/26/20  Yes Librado Alvarez DO   glucose monitoring kit (FREESTYLE) monitoring kit Use once. 5/26/20  Yes Librado Alvarez DO   clindamycin (CLEOCIN) 300 MG capsule Take 1 capsule by mouth 3 times daily for 7 days 5/26/20 6/2/20 Yes Abel Alvarez DO   traZODone (DESYREL) 50 MG tablet TAKE 1 TABLET BY MOUTH EVERY DAY AT BEDTIME AS NEEDED FOR SLEEP 5/18/20  Yes Jennifer Alvarez DO   bumetanide (BUMEX) 1 MG tablet TAKE 1 TABLET BY MOUTH TWICE A DAY 5/14/20  Yes Abel Alvarez DO   ipratropium-albuterol (DUONEB) 0.5-2.5 (3) MG/3ML SOLN nebulizer solution USE 1 VIAL VIA NEBULIZER EVERY 4 HOURS AS NEEDED FOR SHORTNESS OF BREATH 5/14/20  Yes Jennifer Alvarez DO   dilTIAZem (CARDIZEM CD) 240 MG extended release capsule TAKE 1 CAPSULE BY MOUTH TWICE A DAY 5/5/20  Yes Librado Alvarez DO   Insulin Pen Needle 30G X 8 MM MISC 1 each by Does not apply route daily 3/3/20  Yes Rosana Dempsey, DO   blood glucose monitor strips Test 4 times a day & as needed for symptoms of irregular blood glucose.  2/26/20  Yes Radha Machuca, DO   vitamin D (ERGOCALCIFEROL) 1.25 MG (39317 UT) CAPS capsule TAKE 1 CAPSULE BY MOUTH TWICE WEEKLY 1/24/20  Yes Librado Alvarez DO   ferrous sulfate 325 (65 Fe) MG tablet TAKE 1 TABLET BY MOUTH EVERY DAY WITH BREAKFAST 1/24/20  Yes Librado Alvarez DO   montelukast (SINGULAIR) 10 MG tablet Take 1 tablet by mouth nightly 1/23/20  Yes Jennifer Alvarez, DO   escitalopram (LEXAPRO) 20 MG tablet TAKE 1 TABLET BY MOUTH DAILY 1/23/20  Yes Librado Alvarez, DO   apixaban (ELIQUIS) 5 MG TABS tablet TAKE 1 TABLET BY MOUTH TWICE A DAY 1/23/20  Yes Hermila EspinozaTammiesaranya Alvarez, DO   albuterol sulfate  (90 Base) MCG/ACT inhaler Inhale 2 puffs into the lungs 4 times daily as needed for Wheezing 1/23/20  Yes Jose Camp, DO   blood glucose test strips (ASCENSIA AUTODISC VI;ONE TOUCH ULTRA TEST VI) strip Use 4 times daily 1/23/20  Yes Hermila Tammiesaranya Alvarez, DO   spironolactone (ALDACTONE) 25 MG tablet Take 1 tablet by mouth daily 12/19/19  Yes Hermila Tammiesaranya Alvarez, DO   nystatin (MYCOSTATIN) 806082 UNIT/GM cream Apply topically 2 times daily. 12/7/19  Yes Zbigniew Mcrae, DO   lansoprazole (PREVACID) 30 MG delayed release capsule Take 1 capsule by mouth daily 11/27/19  Yes Hermila Tammiesaranya Alvarez, DO   Icosapent Ethyl (VASCEPA) 1 g CAPS capsule Take 2 capsules by mouth 2 times daily 11/22/19  Yes [facility] Kim, DO   ULTICARE ALCOHOL SWABS 70 % PADS USE 3 TIMES A DAY 10/14/19  Yes Historical Provider, MD   BD INSULIN SYRINGE U/F 31G X 5/16\" 1 ML MISC USE AS DIRECTED 10/18/19  Yes Historical Provider, MD   Insulin Pen Needle 31G X 8 MM MISC 1 each by Does not apply route daily 11/7/19  Yes [facility] Kim, DO   miconazole (MICOTIN) 2 % powder Apply topically 2 times daily.  9/24/19  Yes Tyler Gayle MD   metoprolol succinate (TOPROL XL) 200 MG extended release tablet Take 0.5 tablets by mouth 2 times daily 9/23/19  Yes Dusty Paige,    acetaminophen (TYLENOL) 325 MG tablet Take 650 mg by mouth every 6 hours as needed for Pain   Yes Historical Provider, MD   glucose (GLUTOSE) 40 % GEL Take 37.5 mLs by mouth as needed (hypoglycemia) 12/20/18  Yes RAUL Perez - CNP   OXYGEN Inhale 4 L into the lungs continuous    Yes Historical Provider, MD   Elastic Bandages & Supports MISC 20-30 mmHg bilateral compression stockings  Size to fit  Dx:  Edema  Knee high 12/14/17  Yes Bridger Baca MD        Inpatient:  insulin regular human (humuLIN R U-500 KWIKPEN) 500 UNIT/ML concentrated injection pen 100 Units, TID WC  glucose (GLUTOSE) 40 % oral gel 15 g, PRN  dextrose 50 % IV solution, PRN  glucagon (rDNA) injection 1 mg, PRN  dextrose 5 % solution, PRN  miconazole (MICOTIN) 2 % powder, BID  oxyCODONE-acetaminophen (PERCOCET) 5-325 MG per tablet 1 tablet, Q6H PRN  piperacillin-tazobactam (ZOSYN) 3.375 g in dextrose 5 % 100 mL IVPB extended infusion (mini-bag), Q8H    And  0.9 % sodium chloride infusion admixture, Q8H  acetaminophen (TYLENOL) tablet 650 mg, Q6H PRN  albuterol (PROVENTIL) nebulizer solution 2.5 mg, Q4H PRN  apixaban (ELIQUIS) tablet 5 mg, BID  bumetanide (BUMEX) tablet 1 mg, BID  dilTIAZem (CARDIZEM CD) extended release capsule 240 mg, Daily  escitalopram (LEXAPRO) tablet 20 mg, Daily  ferrous sulfate (IRON 325) tablet 325 mg, Daily with breakfast  gabapentin (NEURONTIN) capsule 300 mg, TID  pantoprazole (PROTONIX) tablet 40 mg, QAM AC  magic (miracle) mouthwash, 4x Daily PRN  metoprolol succinate (TOPROL XL) extended release tablet 100 mg, BID  montelukast (SINGULAIR) tablet 10 mg, Nightly  spironolactone (ALDACTONE) tablet 25 mg, Daily  traZODone (DESYREL) tablet 50 mg, Nightly PRN  vitamin D (ERGOCALCIFEROL) capsule 50,000 Units, Weekly  diphenhydrAMINE (BENADRYL) tablet 25 mg, Q6H PRN  vancomycin (VANCOCIN) 1,250 mg in dextrose 5 % 250 mL IVPB, Q12H  omega-3 acid ethyl esters (LOVAZA) capsule 2 g, BID        Allergies   Statins    Social History     Social History     Tobacco History     Smoking Status  Former Smoker Smoking Start Date  11/27/1979 Quit date  12/18/2004 Smoking Frequency  1.5 packs/day for 25 years (37.5 pk yrs)    Smoking Tobacco Type  Cigarettes    Smokeless Tobacco Use  Never Used    Tobacco Comment  Stopped smoking 16 years ago          Alcohol History     Alcohol Use Status  No Comment  caffeine: no use          Drug Use     Drug Use Status  No          Sexual Activity     Sexually Active  Not Currently Partners  Male                Family History     Family History   Problem Relation Age of Onset  Heart Disease Mother     Diabetes Father     Colon Cancer Father     Other Father         BLINDNESS    Colon Cancer Sister     Diabetes Paternal Aunt     Diabetes Paternal Uncle     Diabetes Paternal Aunt     Diabetes Paternal Uncle        Review of Systems   Pertinent ROS listed in HPI, all others negative    Physical Exam   /64   Pulse 83   Temp 97.2 °F (36.2 °C) (Temporal)   Resp 17   Ht 5' 3\" (1.6 m)   Wt 250 lb (113.4 kg)   LMP  (LMP Unknown)   SpO2 95%   BMI 44.29 kg/m²     GENERAL:  No acute distress. Alert and interactive. HEAD:  Normocephalic, atraumatic. EYES:  No scleral icterus. Conjugate gaze. NECK: Symmetric, no obvious masses  LUNGS:  No cough. Nonlabored breathing on 4LNC (home O2)  CARDIOVASC:  Normal rate, no cyanosis, regular rhythm. ABDOMEN:  Soft, non-distended, non-tender. No guarding / rigidity / rebound. LIMBS:  No deformities, chronic lymphedema  NEURO:  Face symmetric, moves all extremities  SKIN:  Warm, dry. GLUTEAL: bilateral erythema, R gluteal induration with central fluctuance and small amount of purulence expressible from prior I&D site    Labs    CBC  Recent Labs     20  0659   WBC 7.7   HGB 8.3*   HCT 26.8*        BMP  Recent Labs     20  0645   *   K 4.6   CL 91*   CO2 29   BUN 15   CREATININE 0.7   CALCIUM 8.7     Liver Function  Recent Labs     20  2232   BILITOT <0.2   AST 8   ALT 6   ALKPHOS 109*   PROT 7.1   LABALBU 3.4*     No results for input(s): LACTATE in the last 72 hours. No results for input(s): INR, PTT in the last 72 hours. Invalid input(s): PT    Imaging/Diagnostics Last 24 Hours   Ct Abdomen W Wo Contrast Additional Contrast? Radiologist Recommendation    Result Date: 2020  Patient MRN:  [id number] : 1955 Age: 75 years Gender: Female Order Date:  2020 1:45 PM TECHNIQUE/NUMBER OF IMAGES/COMPARISON/CLINICAL HISTORY: CT abdomen adrenal protocol.  Precontrast, early arterial phase, portal venous phase and late 10 minutes phase were obtained. IV contrast 100 mL of Isovue-370 clinical history a right adrenal gland lesion. FINDINGS: Measurements for the right adrenal gland lesion density are the following: Precontrast: 34 Hounsfield units Arterial phase: 39 Hounsfield units Portal venous phase: 45 Hounsfield units Late phase: 49 Hounsfield units. This indicates  negative value is a for the absolute washout and for the relative washout, which makes this lesion undetermined by CT characterization of washout curve pattern. This lesion has increased size as mentioned on the report of the CT abdomen and pelvis of May 29 performed earlier when comparison is made with previous study of April 2019. Increased size of the right adrenal gland lesion up to 3.4 x 3 cm, can be of can indicate a suspicious lesion. An alternative to an interventional procedure as final interpretation can require correlation with histopathology will be MRI with in and out of phase. Left adrenal gland appears unremarkable. There are normal size and enhancement for the liver and spleen a multiphase evaluation. The pancreas has atrophy but appears unremarkable no focal lesions are seen. There is normal multiphasic enhancement for the pancreas are. Gallbladder is somewhat distended, it appears unremarkable. There is no dilatation of the biliary tree and pancreatic ductal system. The kidneys have unremarkable appearance. There is normal excretion of the contrast by the kidneys. Parapelvic cysts are seen in both kidneys. Aorta and IVC appear unremarkable. There is no midline retroperitoneal adenopathy. No acute inflammatory changes seen in the omental mesenteric fat planes of the abdomen. This study does not cover the pelvis. There is no ascites. There is no indication for bowel obstruction. Lower lung bases demonstrate areas of subpleural increased density are discrete subpleural atelectasis impulse lower lobes.      1. CT scan washout curve evaluation of the right adrenal gland washout demonstrate a undetermined characterization lesion. Due to the increased size since the previous study of 2019 this is a suspicious lesion. 2. See above comments and recommendations. Ct Abdomen Pelvis W Iv Contrast Additional Contrast? None    Result Date: 2020  Patient MRN:  [id number] : 1955 Age: 75 years Gender: Female Order Date:  2020 9:45 PM TECHNIQUE/NUMBER OF IMAGES/COMPARISON/CLINICAL HISTORY: CT abdomen pelvis. After IV contrast axial images were obtained sagittal and coronal reconstructions Comparison study  [age]year-old female patient with the history of abscess right interlobar but. The perirectal disease. FINDINGS: There are subcutaneous soft tissue edema and swelling in the more posterior medial aspect of the right buttock area in the subcutaneous soft tissues with thickening of the dermis or. There is no significant extension of the inflammatory process into the perirectal area. No perirectal abscess seen. The no abscess seen in the subtenon soft tissues are. The there is some extension into the deep the muscle fascial plane but doesn't appears to be conspicuous extension into intermuscular planes. In the upper abdomen there is a splenomegaly with the spleen measuring 15 x 5 cm. The liver has borderline size. There is normal enhancement for the liver and spleen. Pancreas demonstrates atrophy and partial fat replacement. Gallbladder is nondistended. Biliary tree and pancreatic ductal system are not dilated. There is further increased size of the right adrenal gland since the previous examination presently measures 3.4 x 3 cm previously was 2.6 x 1.6 cm. The left adrenal gland is not enlarged. Kidneys have preserved size and cortical thickness. Parapelvic cysts are seen in both kidneys. There is normal cortical enhancement for the kidneys. The ureters are patent.  The bladder is not distended but appears unremarkable. The uterus is enlarged, it measures 11 x 3.8 x 7.3 cm, has discrete loculations of the outlines with the represent a diameters. Ultrasound can be helpful. The ovaries have upper borderline size with unremarkable appearance. No fluid is seen in the cul-de-sac. There are no acute inflammatory changes in the omental mesenteric fat planes, free intraperitoneal air, ascites or indication for bowel obstruction. Abdominal aorta and IVC appear unremarkable. Could not conspicuously identify the appendix or separate the appendix from adjacent bowel segments but there is no pericecal pericolonic inflammatory process. Lower lung bases demonstrate no significant findings. Some discrete residual scar is seen in the lower lobes. Calcifications are seen in the coronary arteries . There is some prominent diameter for the central pulmonary arteries to be correlated clinically. The heart has upper normal size. Degenerative changes are seen in the lumbar spine particularly in L4-5, and L5-S1 disc space and in the corresponding facet joints. 1. Findings for dermatocellulitis of the subtenon soft tissues of the more posterior inferior medial aspect of the right gluteal region. The no conspicuous extension into the very spaces. The no abscess formation seen presently. No air in the soft tissues. 2. Splenomegaly with borderline size liver to be correlate clinically. 3. Further increased size of the right adrenal gland no measuring 3.4 x 3 cm. The final interpretation can require correlation with histopathology. Please consider consultation with endocrinology/surgery on routine bases. ALERT:  ABNORMAL REPORT. Tahira Arriaga Chest Portable    Result Date: 5/28/2020  Clinical indications: Wound check. Dyspnea. TECHNIQUE: Single frontal projection of the chest (1 view). COMPARISON: None. FINDINGS: The heart is enlarged. Acromioclavicular arthropathy. The heart, lungs, mediastinum and regional skeleton are otherwise unremarkable. No evidence for acute cardiopulmonary process. Us Dup Lower Extremities Bilateral Venous    Result Date: 5/28/2020  Real-time and Doppler sonography of the deep venous structures of the lower extremities. Grayscale, color Doppler, and spectral Doppler analysis. There is adequate and spontaneous blood flow, compressibility and augmentation within the bilateral common femoral, superficial femoral, and popliteal veins. Below the knee, there were no diagnostic images. No evidence for deep vein thrombosis of the lower extremities to the level of the knee. Assessment      Hospital Problems           Last Modified POA    Abscess and cellulitis of gluteal region 5/29/2020 Yes          75 y.o. female with cellulitis+abscess of R gluteus    Plan   - extend incision for drainage at bedside  - continue antibiotics per primary/ID  - anticipate prolonged healing given extremely poor glucose control  - medicine and endocrine working on glucose control, patient was noncompliant at home    Plan was discussed with Dr. Pete Rordiges.     Electronically signed by Iva Hugo MD on 5/31/20 at 2:25 PM EDT

## 2020-05-31 NOTE — PLAN OF CARE
Problem: Falls - Risk of:  Goal: Will remain free from falls  Description: Will remain free from falls  Outcome: Met This Shift     Problem: Falls - Risk of:  Goal: Absence of physical injury  Description: Absence of physical injury  Outcome: Met This Shift     Problem: Pain:  Goal: Pain level will decrease  Description: Pain level will decrease  Outcome: Not Met This Shift   ~Patient requiring PRN pain medication this shift.

## 2020-05-31 NOTE — PROGRESS NOTES
Dr. Srini Taylor notified of lunch time blood sugar 265. New order for U-500 insulin 60 units x 1 dose instead of the already ordered 100 units with meals. Pharmacy called to verify order. Patient updated.

## 2020-05-31 NOTE — PROGRESS NOTES
Endocrine Brief Communication Note     I have reviewed lab, imaging results blood glucose trend & insulin requirement over the past 24 hrs    DM type 2:  · Still running very hig  · Profoundly uncontrolled due to combination of high insulin resistance and poor compliance with diabetic diet  · Will switch to U-500 insulin    · Humulin-U500 insulin 100 units TID with meals   · Continue glucose check with meals and at bedtime   · Will titrate insulin dose based on the blood glucose trend & insulin requirement  · will arrange for the patient to be seen by our inpatient diabetes education team for further teaching on consistent Carb diet     Rt adrenal mass   · CT adrenal  5/30/2020 --> high Hounsfield units with negative washout makings lesion undetermined. · Given size change, this lesion will need biopsy (can done as an outpatient). We have to r/o Pheo before proceeding with the biopsy especially with high Hounsfield units.  Plasma metanephrine pending     Rik Plummer MD  Endocrinologist, [facility] Diabetes Care and Endocrinology   [address] [facility] [address]   Phone: [phone]  Fax: [phone]

## 2020-05-31 NOTE — PROGRESS NOTES
Pharmacy Consultation Note  (Antibiotic Dosing and Monitoring)    Initial consult date: 2020  Consulting physician: Dr. Cris Garibay  Drug(s): vancomycin  Indication: CSSSI (coccyx/gluteal infection/abscess)  Age/Gender IBW DW  Allergy Information   64 y.o./F; 160 cm, 113.4 kg 52.9 kg 77.1 kg  Statins             Date  WBC BUN/CR Drug/Dose Time   Given Level(s)   (Time) Comments        7.7   13/0.6 vancomycin 1500 mg x1    vanco 1250 mg q12h 0408    1636      -- 15/0.7 vanco 1250 mg q 12 hr  not given      ()- not given loss of IV access      -- -- vanco 1250 mg IV q 12 hr  (0900) Vanco trough at 0950 was 5.9 mcg/mL               Estimated Creatinine Clearance: 96 mL/min (based on SCr of 0.7 mg/dL). Intake/Output Summary (Last 24 hours) at 2020 1036  Last data filed at 2020 0609  Gross per 24 hour   Intake 1480 ml   Output 4350 ml   Net -2870 ml       Temp max: Temp (24hrs), Av.3 °F (36.3 °C), Min:97.1 °F (36.2 °C), Max:97.5 °F (36.4 °C)      Cultures:  available culture and sensitivity results were reviewed in [facility]      Assessment:  · Consulted by Dr. Cris Garibay to dose/monitor vancomycin. · Goal trough level:  15-20 mCg/mL. · 65 yo/F admitted from home for coccyx wound infection/abscess. Started on PO clindamycin as OP on . · Empiric ATBs (vanco and cefepime) started in ED; cefepime changed to pip-tazo on admission. · Fluconazole started for vaginal yeast infection. · ID has been consulted. · : Scr 0.7, patient afebrile. Medication not given at 0600, I re-timed medication, and RN said she would give. · : Patient lost IV access last night and dose was not given. Plan:  · Cont vancomycin 1250 mg q12h. · Will check vancomycin trough level when new steady state is attained. · Pharmacist will follow and monitor/adjust dosing as necessary.     Cory Conley PharmD, BCPS 2020 10:36 AM

## 2020-05-31 NOTE — PROGRESS NOTES
[address] [place] [address] [facility]  SUZANNE  Progress Note      Chief Complaint   Patient presents with    Wound Check     wound on coccyx that has been increasingly painful. pt states she thinks it is infected. SUBJECTIVE:      Patient is tolerating medications. No reported adverse drug reactions. No problems overnight. Review of systems:    As stated above in the chief complaint, otherwise negative. Medications:    Scheduled Meds:   insulin regular human  35 Units Subcutaneous TID WC    miconazole   Topical BID    piperacillin-tazobactam  3.375 g Intravenous Q8H    And    sodium chloride  25 mL Intravenous Q8H    [Held by provider] apixaban  5 mg Oral BID    bumetanide  1 mg Oral BID    dilTIAZem  240 mg Oral Daily    escitalopram  20 mg Oral Daily    ferrous sulfate  325 mg Oral Daily with breakfast    gabapentin  300 mg Oral TID    pantoprazole  40 mg Oral QAM AC    metoprolol succinate  100 mg Oral BID    montelukast  10 mg Oral Nightly    spironolactone  25 mg Oral Daily    vitamin D  50,000 Units Oral Weekly    vancomycin  1,250 mg Intravenous Q12H    omega-3 acid ethyl esters  2 g Oral BID     Continuous Infusions:   dextrose       PRN Meds:glucose, dextrose, glucagon (rDNA), dextrose, oxyCODONE-acetaminophen, acetaminophen, albuterol, magic (miracle) mouthwash, traZODone, diphenhydrAMINE  Prior to Admission medications    Medication Sig Start Date End Date Taking? Authorizing Provider   Magic Mouthwash (MIRACLE MOUTHWASH) Swish and spit 5 mLs 4 times daily as needed for Irritation 5/28/20  Yes Brenda Alvarez,    gabapentin (NEURONTIN) 300 MG capsule Take 1 capsule by mouth 3 times daily for 30 days.  5/28/20 6/27/20 Yes Jennifer Alvarez DO   insulin glargine (LANTUS) 100 UNIT/ML injection vial Inject 40 Units into the skin 2 times daily 5/27/20  Yes Jennifer Alvarez, DO   insulin lispro (HUMALOG) 100 UNIT/ML injection vial INJECT 30 UNITS INTO THE SKIN 3 TIMES DAILY (BEFORE MEALS) 5/26/20  Yes Mayo Alcala DO   glucose monitoring kit (FREESTYLE) monitoring kit Use once. 5/26/20  Yes Lexie Alvarez DO   clindamycin (CLEOCIN) 300 MG capsule Take 1 capsule by mouth 3 times daily for 7 days 5/26/20 6/2/20 Yes Molina Alvarez DO   traZODone (DESYREL) 50 MG tablet TAKE 1 TABLET BY MOUTH EVERY DAY AT BEDTIME AS NEEDED FOR SLEEP 5/18/20  Yes Jennifer Alvarez DO   bumetanide (BUMEX) 1 MG tablet TAKE 1 TABLET BY MOUTH TWICE A DAY 5/14/20  Yes Molina Alvarez DO   ipratropium-albuterol (DUONEB) 0.5-2.5 (3) MG/3ML SOLN nebulizer solution USE 1 VIAL VIA NEBULIZER EVERY 4 HOURS AS NEEDED FOR SHORTNESS OF BREATH 5/14/20  Yes Jennifer Alvarez DO   dilTIAZem (CARDIZEM CD) 240 MG extended release capsule TAKE 1 CAPSULE BY MOUTH TWICE A DAY 5/5/20  Yes Lexie Alvarez DO   Insulin Pen Needle 30G X 8 MM MISC 1 each by Does not apply route daily 3/3/20  Yes Mayo Alcala,    blood glucose monitor strips Test 4 times a day & as needed for symptoms of irregular blood glucose.  2/26/20  Yes Lindsey Olson, DO   vitamin D (ERGOCALCIFEROL) 1.25 MG (09380 UT) CAPS capsule TAKE 1 CAPSULE BY MOUTH TWICE WEEKLY 1/24/20  Yes Lexie Alvarez DO   ferrous sulfate 325 (65 Fe) MG tablet TAKE 1 TABLET BY MOUTH EVERY DAY WITH BREAKFAST 1/24/20  Yes Jennifer Alvarez DO   montelukast (SINGULAIR) 10 MG tablet Take 1 tablet by mouth nightly 1/23/20  Yes Jennifer Alvarez DO   escitalopram (LEXAPRO) 20 MG tablet TAKE 1 TABLET BY MOUTH DAILY 1/23/20  Yes Lexie Alvarez DO   apixaban (ELIQUIS) 5 MG TABS tablet TAKE 1 TABLET BY MOUTH TWICE A DAY 1/23/20  Yes Lexie Alvarez DO   albuterol sulfate  (90 Base) MCG/ACT inhaler Inhale 2 puffs into the lungs 4 times daily as needed for Wheezing 1/23/20  Yes Lexie Alvarez, DO   blood glucose test strips (ASCENSIA AUTODISC VI;ONE TOUCH ULTRA TEST VI) strip Use 4 times daily 1/23/20  Yes Mayo Alclaa, DO spironolactone (ALDACTONE) 25 MG tablet Take 1 tablet by mouth daily 19  Yes Mayo Alcala, DO   nystatin (MYCOSTATIN) 743562 UNIT/GM cream Apply topically 2 times daily. 19  Yes Ghassan Beatty, DO   lansoprazole (PREVACID) 30 MG delayed release capsule Take 1 capsule by mouth daily 19  Yes Lexie Alvarez, DO   Icosapent Ethyl (VASCEPA) 1 g CAPS capsule Take 2 capsules by mouth 2 times daily 19  Yes Molina Alvarez, DO   ULTICARE ALCOHOL SWABS 70 % PADS USE 3 TIMES A DAY 10/14/19  Yes Historical Provider, MD   BD INSULIN SYRINGE U/F 31G X 5/16\" 1 ML MISC USE AS DIRECTED 10/18/19  Yes Historical Provider, MD   Insulin Pen Needle 31G X 8 MM MISC 1 each by Does not apply route daily 19  Yes Molina Alvarez, DO   miconazole (MICOTIN) 2 % powder Apply topically 2 times daily. 19  Yes Tyler Gayle MD   metoprolol succinate (TOPROL XL) 200 MG extended release tablet Take 0.5 tablets by mouth 2 times daily 19  Yes Farrah Bai,    acetaminophen (TYLENOL) 325 MG tablet Take 650 mg by mouth every 6 hours as needed for Pain   Yes Historical Provider, MD   glucose (GLUTOSE) 40 % GEL Take 37.5 mLs by mouth as needed (hypoglycemia) 18  Yes Connie Rivera APRN - CNP   OXYGEN Inhale 4 L into the lungs continuous    Yes Historical Provider, MD   Elastic Bandages & Supports MISC 20-30 mmHg bilateral compression stockings  Size to fit  Dx:  Edema  Knee high 17  Yes Bee Edmondson MD       OBJECTIVE:  /63   Pulse 74   Temp 97.5 °F (36.4 °C) (Temporal)   Resp 16   Ht 5' 3\" (1.6 m)   Wt 250 lb (113.4 kg)   LMP  (LMP Unknown)   SpO2 100%   BMI 44.29 kg/m²   Temp  Av.3 °F (36.3 °C)  Min: 97.1 °F (36.2 °C)  Max: 97.5 °F (36.4 °C)  Skin: Warm and dry. No rashes were noted. HEENT: Round and reactive pupils. Moist mucous membranes. No ulcerations or thrush. Neck: Supple to movements. Chest: No use of accessory muscles to breathe. Symmetrical expansion. No wheezing, crackles or rhonchi. Cardiovascular: Reguar rate and rhythm. No murmurs gallops, or rubs appreciated. Abdomen: Bowel sounds present, nontender, nondistended, no masses or hepatosplenomegaly. Extremities: No clubbing, no cyanosis, no edema. Lines: peripheral    I/O last 3 completed shifts: In: 1320 [P.O.:1320]  Out: 4450 [Urine:4450]      Laboratory and Tests Review:      Lab Results   Component Value Date    WBC 7.7 05/29/2020    WBC 8.4 05/28/2020    WBC 9.1 05/26/2020    HGB 8.3 (L) 05/29/2020    HCT 26.8 (L) 05/29/2020    MCV 83.2 05/29/2020     05/29/2020     Lab Results   Component Value Date    NEUTROABS 5.72 05/29/2020    NEUTROABS 6.60 05/28/2020    NEUTROABS 7.77 (H) 05/26/2020     No results found for: [facility]  Lab Results   Component Value Date    ALT 6 05/28/2020    AST 8 05/28/2020    ALKPHOS 109 (H) 05/28/2020    BILITOT <0.2 05/28/2020     Lab Results   Component Value Date     05/30/2020    K 4.6 05/30/2020    K 5.4 01/19/2020    CL 91 05/30/2020    CO2 29 05/30/2020    BUN 15 05/30/2020    CREATININE 0.7 05/30/2020    CREATININE 0.6 05/29/2020    CREATININE 0.8 05/28/2020    GFRAA >60 05/30/2020    LABGLOM >60 05/30/2020    GLUCOSE 625 05/30/2020    GLUCOSE 181 12/16/2011    PROT 7.1 05/28/2020    LABALBU 3.4 05/28/2020    LABALBU 4.5 11/02/2011    CALCIUM 8.7 05/30/2020    BILITOT <0.2 05/28/2020    ALKPHOS 109 05/28/2020    AST 8 05/28/2020    ALT 6 05/28/2020     Lab Results   Component Value Date    CRP 14.0 (H) 05/29/2020    CRP 2.2 (H) 12/24/2019    CRP 2.0 (H) 09/22/2019     Lab Results   Component Value Date    SEDRATE 75 (H) 05/29/2020    SEDRATE 41 (H) 12/24/2019    SEDRATE 38 (H) 09/22/2019       Radiology:    CT ABDOMEN W WO CONTRAST Additional Contrast? Radiologist Recommendation   Final Result   1. CT scan washout curve evaluation of the right adrenal gland washout   demonstrate a undetermined characterization lesion.  Due to the increased size since the previous study of April 2019 this is a   suspicious lesion. 2. See above comments and recommendations. CT ABDOMEN PELVIS W IV CONTRAST Additional Contrast? None   Final Result   1. Findings for dermatocellulitis of the subtenon soft tissues of the   more posterior inferior medial aspect of the right gluteal region. The   no conspicuous extension into the very spaces. The no abscess   formation seen presently. No air in the soft tissues. 2. Splenomegaly with borderline size liver to be correlate clinically. 3. Further increased size of the right adrenal gland no measuring 3.4   x 3 cm. The final interpretation can require correlation with   histopathology. Please consider consultation with   endocrinology/surgery on routine bases. ALERT:  ABNORMAL REPORT. Harold Bound US DUP LOWER EXTREMITIES BILATERAL VENOUS   Final Result      No evidence for deep vein thrombosis of the lower extremities to the   level of the knee. XR CHEST PORTABLE   Final Result      No evidence for acute cardiopulmonary process. Microbiology:   Lab Results   Component Value Date    BC 24 Hours- no growth 05/28/2020    BC 5 Days- no growth 12/24/2019    BC 5 Days- no growth 09/22/2019    ORG Candida albicans 11/30/2018    ORG Pseudomonas aeruginosa 11/30/2018    ORG Escherichia coli 11/30/2018    ORG Strep agalactiae (Beta Strep Group B) 11/30/2018    ORG Gram negative yogesh 11/30/2018     Lab Results   Component Value Date    BLOODCULT2 24 Hours- no growth 05/28/2020    BLOODCULT2 5 Days- no growth 12/24/2019    BLOODCULT2 5 Days- no growth 09/22/2019    ORG Candida albicans 11/30/2018    ORG Pseudomonas aeruginosa 11/30/2018    ORG Escherichia coli 11/30/2018    ORG Strep agalactiae (Beta Strep Group B) 11/30/2018    ORG Gram negative yogesh 11/30/2018     WOUND/ABSCESS   Date Value Ref Range Status   09/22/2019   Final    Mixed orin isolated.  Further workup and History of anal fissure   Chronic lymphedema   Bilateral lower extremity chronic skin changes/lymphedema   Afebrile   Plan:   Cont vanco and zosyn   Pharmacy to dose vancomycin   Check cultures   Baseline ESR-75, CRP-14 (05/30/2020)   Continue local wound care   Wound care help appreciated   Monitor labs   Will follow with you   Would still get surgery input    Durga Bach DO    7:44 PM  5/31/2020

## 2020-06-01 ENCOUNTER — TELEPHONE (OUTPATIENT)
Dept: FAMILY MEDICINE CLINIC | Age: 65
End: 2020-06-01

## 2020-06-01 LAB
ANION GAP SERPL CALCULATED.3IONS-SCNC: 11 MMOL/L (ref 7–16)
BASOPHILS ABSOLUTE: 0.04 E9/L (ref 0–0.2)
BASOPHILS RELATIVE PERCENT: 0.8 % (ref 0–2)
BUN BLDV-MCNC: 17 MG/DL (ref 8–23)
CALCIUM SERPL-MCNC: 9.2 MG/DL (ref 8.6–10.2)
CHLORIDE BLD-SCNC: 92 MMOL/L (ref 98–107)
CO2: 35 MMOL/L (ref 22–29)
CREAT SERPL-MCNC: 0.7 MG/DL (ref 0.5–1)
EOSINOPHILS ABSOLUTE: 0.13 E9/L (ref 0.05–0.5)
EOSINOPHILS RELATIVE PERCENT: 2.5 % (ref 0–6)
GFR AFRICAN AMERICAN: >60
GFR NON-AFRICAN AMERICAN: >60 ML/MIN/1.73
GLUCOSE BLD-MCNC: 189 MG/DL (ref 74–99)
HCT VFR BLD CALC: 29.7 % (ref 34–48)
HEMOGLOBIN: 8.9 G/DL (ref 11.5–15.5)
IMMATURE GRANULOCYTES #: 0.06 E9/L
IMMATURE GRANULOCYTES %: 1.1 % (ref 0–5)
LYMPHOCYTES ABSOLUTE: 1.75 E9/L (ref 1.5–4)
LYMPHOCYTES RELATIVE PERCENT: 33.5 % (ref 20–42)
MCH RBC QN AUTO: 25 PG (ref 26–35)
MCHC RBC AUTO-ENTMCNC: 30 % (ref 32–34.5)
MCV RBC AUTO: 83.4 FL (ref 80–99.9)
METER GLUCOSE: 160 MG/DL (ref 74–99)
METER GLUCOSE: 212 MG/DL (ref 74–99)
METER GLUCOSE: 213 MG/DL (ref 74–99)
METER GLUCOSE: 231 MG/DL (ref 74–99)
METER GLUCOSE: 252 MG/DL (ref 74–99)
MONOCYTES ABSOLUTE: 0.54 E9/L (ref 0.1–0.95)
MONOCYTES RELATIVE PERCENT: 10.3 % (ref 2–12)
NEUTROPHILS ABSOLUTE: 2.71 E9/L (ref 1.8–7.3)
NEUTROPHILS RELATIVE PERCENT: 51.8 % (ref 43–80)
PDW BLD-RTO: 14.3 FL (ref 11.5–15)
PLATELET # BLD: 347 E9/L (ref 130–450)
PMV BLD AUTO: 9.3 FL (ref 7–12)
POTASSIUM REFLEX MAGNESIUM: 4 MMOL/L (ref 3.5–5)
RBC # BLD: 3.56 E12/L (ref 3.5–5.5)
SODIUM BLD-SCNC: 138 MMOL/L (ref 132–146)
WBC # BLD: 5.2 E9/L (ref 4.5–11.5)

## 2020-06-01 PROCEDURE — 80048 BASIC METABOLIC PNL TOTAL CA: CPT

## 2020-06-01 PROCEDURE — 99231 SBSQ HOSP IP/OBS SF/LOW 25: CPT | Performed by: SURGERY

## 2020-06-01 PROCEDURE — 82962 GLUCOSE BLOOD TEST: CPT

## 2020-06-01 PROCEDURE — 85025 COMPLETE CBC W/AUTO DIFF WBC: CPT

## 2020-06-01 PROCEDURE — 2060000000 HC ICU INTERMEDIATE R&B

## 2020-06-01 PROCEDURE — 6370000000 HC RX 637 (ALT 250 FOR IP): Performed by: FAMILY MEDICINE

## 2020-06-01 PROCEDURE — 99232 SBSQ HOSP IP/OBS MODERATE 35: CPT | Performed by: INTERNAL MEDICINE

## 2020-06-01 PROCEDURE — 36415 COLL VENOUS BLD VENIPUNCTURE: CPT

## 2020-06-01 PROCEDURE — 2580000003 HC RX 258: Performed by: FAMILY MEDICINE

## 2020-06-01 PROCEDURE — 6360000002 HC RX W HCPCS: Performed by: FAMILY MEDICINE

## 2020-06-01 RX ORDER — OMEGA-3-ACID ETHYL ESTERS 1 G/1
2 CAPSULE, LIQUID FILLED ORAL 2 TIMES DAILY
Qty: 60 CAPSULE | Refills: 3 | Status: SHIPPED | OUTPATIENT
Start: 2020-06-01 | End: 2020-09-01 | Stop reason: SDUPTHER

## 2020-06-01 RX ORDER — PEN NEEDLE, DIABETIC 31 GX5/16"
1 NEEDLE, DISPOSABLE MISCELLANEOUS DAILY
Qty: 100 EACH | Refills: 0 | Status: SHIPPED | OUTPATIENT
Start: 2020-06-01 | End: 2020-11-17

## 2020-06-01 RX ADMIN — ACETAMINOPHEN 650 MG: 325 TABLET, FILM COATED ORAL at 15:11

## 2020-06-01 RX ADMIN — SODIUM CHLORIDE 25 ML: 9 INJECTION, SOLUTION INTRAVENOUS at 07:58

## 2020-06-01 RX ADMIN — OXYCODONE AND ACETAMINOPHEN 1 TABLET: 5; 325 TABLET ORAL at 04:56

## 2020-06-01 RX ADMIN — OMEGA-3-ACID ETHYL ESTERS 2 G: 1 CAPSULE, LIQUID FILLED ORAL at 21:10

## 2020-06-01 RX ADMIN — DILTIAZEM HYDROCHLORIDE 240 MG: 240 CAPSULE, EXTENDED RELEASE ORAL at 08:01

## 2020-06-01 RX ADMIN — ESCITALOPRAM 20 MG: 10 TABLET, FILM COATED ORAL at 08:00

## 2020-06-01 RX ADMIN — PIPERACILLIN AND TAZOBACTAM 3.38 G: 3; .375 INJECTION, POWDER, FOR SOLUTION INTRAVENOUS at 11:15

## 2020-06-01 RX ADMIN — PANTOPRAZOLE SODIUM 40 MG: 40 TABLET, DELAYED RELEASE ORAL at 08:00

## 2020-06-01 RX ADMIN — VANCOMYCIN HYDROCHLORIDE 1250 MG: 10 INJECTION, POWDER, LYOPHILIZED, FOR SOLUTION INTRAVENOUS at 21:10

## 2020-06-01 RX ADMIN — MICONAZOLE NITRATE: 20.6 POWDER TOPICAL at 10:15

## 2020-06-01 RX ADMIN — GABAPENTIN 300 MG: 300 CAPSULE ORAL at 21:10

## 2020-06-01 RX ADMIN — ACETAMINOPHEN 650 MG: 325 TABLET, FILM COATED ORAL at 21:19

## 2020-06-01 RX ADMIN — MONTELUKAST SODIUM 10 MG: 10 TABLET, FILM COATED ORAL at 21:10

## 2020-06-01 RX ADMIN — GABAPENTIN 300 MG: 300 CAPSULE ORAL at 10:10

## 2020-06-01 RX ADMIN — SPIRONOLACTONE 25 MG: 25 TABLET ORAL at 08:01

## 2020-06-01 RX ADMIN — BUMETANIDE 1 MG: 1 TABLET ORAL at 21:10

## 2020-06-01 RX ADMIN — PIPERACILLIN AND TAZOBACTAM 3.38 G: 3; .375 INJECTION, POWDER, FOR SOLUTION INTRAVENOUS at 18:32

## 2020-06-01 RX ADMIN — METOPROLOL SUCCINATE 100 MG: 100 TABLET, EXTENDED RELEASE ORAL at 10:09

## 2020-06-01 RX ADMIN — MICONAZOLE NITRATE: 20.6 POWDER TOPICAL at 21:09

## 2020-06-01 RX ADMIN — GABAPENTIN 300 MG: 300 CAPSULE ORAL at 15:07

## 2020-06-01 RX ADMIN — METOPROLOL SUCCINATE 100 MG: 100 TABLET, EXTENDED RELEASE ORAL at 21:10

## 2020-06-01 RX ADMIN — DIPHENHYDRAMINE HCL 25 MG: 25 TABLET ORAL at 18:37

## 2020-06-01 RX ADMIN — OXYCODONE AND ACETAMINOPHEN 1 TABLET: 5; 325 TABLET ORAL at 11:14

## 2020-06-01 RX ADMIN — BUMETANIDE 1 MG: 1 TABLET ORAL at 10:10

## 2020-06-01 RX ADMIN — APIXABAN 5 MG: 5 TABLET, FILM COATED ORAL at 21:10

## 2020-06-01 RX ADMIN — OMEGA-3-ACID ETHYL ESTERS 2 G: 1 CAPSULE, LIQUID FILLED ORAL at 10:09

## 2020-06-01 RX ADMIN — PIPERACILLIN AND TAZOBACTAM 3.38 G: 3; .375 INJECTION, POWDER, FOR SOLUTION INTRAVENOUS at 03:08

## 2020-06-01 RX ADMIN — SODIUM CHLORIDE 25 ML: 9 INJECTION, SOLUTION INTRAVENOUS at 15:12

## 2020-06-01 RX ADMIN — ACETAMINOPHEN 650 MG: 325 TABLET, FILM COATED ORAL at 01:00

## 2020-06-01 RX ADMIN — OXYCODONE AND ACETAMINOPHEN 1 TABLET: 5; 325 TABLET ORAL at 18:32

## 2020-06-01 RX ADMIN — VANCOMYCIN HYDROCHLORIDE 1250 MG: 10 INJECTION, POWDER, LYOPHILIZED, FOR SOLUTION INTRAVENOUS at 10:15

## 2020-06-01 RX ADMIN — FERROUS SULFATE TAB 325 MG (65 MG ELEMENTAL FE) 325 MG: 325 (65 FE) TAB at 08:00

## 2020-06-01 ASSESSMENT — PAIN SCALES - GENERAL
PAINLEVEL_OUTOF10: 9
PAINLEVEL_OUTOF10: 5
PAINLEVEL_OUTOF10: 3
PAINLEVEL_OUTOF10: 4
PAINLEVEL_OUTOF10: 8
PAINLEVEL_OUTOF10: 2
PAINLEVEL_OUTOF10: 10
PAINLEVEL_OUTOF10: 8
PAINLEVEL_OUTOF10: 5
PAINLEVEL_OUTOF10: 5
PAINLEVEL_OUTOF10: 3
PAINLEVEL_OUTOF10: 7

## 2020-06-01 ASSESSMENT — PAIN DESCRIPTION - DESCRIPTORS: DESCRIPTORS: ACHING;CRAMPING

## 2020-06-01 ASSESSMENT — PAIN DESCRIPTION - LOCATION
LOCATION: OTHER (COMMENT)
LOCATION: LEG
LOCATION: BUTTOCKS

## 2020-06-01 ASSESSMENT — PAIN DESCRIPTION - PAIN TYPE
TYPE: CHRONIC PAIN

## 2020-06-01 ASSESSMENT — PAIN - FUNCTIONAL ASSESSMENT: PAIN_FUNCTIONAL_ASSESSMENT: ACTIVITIES ARE NOT PREVENTED

## 2020-06-01 ASSESSMENT — PAIN DESCRIPTION - FREQUENCY: FREQUENCY: CONTINUOUS

## 2020-06-01 ASSESSMENT — PAIN DESCRIPTION - ORIENTATION: ORIENTATION: RIGHT;LEFT

## 2020-06-01 NOTE — PROGRESS NOTES
attained. · Pharmacist will follow and monitor/adjust dosing as necessary.     Jannette Joseph PharmD  6/1/2020  12:27 PM  Pager: [phone]

## 2020-06-01 NOTE — PROGRESS NOTES
[address] [place] [address] [facility]  DARLYNIDA  Progress Note      Chief Complaint   Patient presents with    Wound Check     wound on coccyx that has been increasingly painful. pt states she thinks it is infected. SUBJECTIVE:    Patient is seen and examined at bedside. She is awake and alert. She is very uncomfortable in the bed and reports pain to the buttocks area. Patient is tolerating medications. No reported adverse drug reactions. No problems overnight. Review of systems:    As stated above in the chief complaint, otherwise negative. Medications:    Scheduled Meds:   insulin regular human  45 Units Subcutaneous TID WC    miconazole   Topical BID    piperacillin-tazobactam  3.375 g Intravenous Q8H    And    sodium chloride  25 mL Intravenous Q8H    [Held by provider] apixaban  5 mg Oral BID    bumetanide  1 mg Oral BID    dilTIAZem  240 mg Oral Daily    escitalopram  20 mg Oral Daily    ferrous sulfate  325 mg Oral Daily with breakfast    gabapentin  300 mg Oral TID    pantoprazole  40 mg Oral QAM AC    metoprolol succinate  100 mg Oral BID    montelukast  10 mg Oral Nightly    spironolactone  25 mg Oral Daily    vitamin D  50,000 Units Oral Weekly    vancomycin  1,250 mg Intravenous Q12H    omega-3 acid ethyl esters  2 g Oral BID     Continuous Infusions:   dextrose       PRN Meds:glucose, dextrose, glucagon (rDNA), dextrose, oxyCODONE-acetaminophen, acetaminophen, albuterol, magic (miracle) mouthwash, traZODone, diphenhydrAMINE  Prior to Admission medications    Medication Sig Start Date End Date Taking? Authorizing Provider   Magic Mouthwash (MIRACLE MOUTHWASH) Swish and spit 5 mLs 4 times daily as needed for Irritation 5/28/20  Yes Lexie Alvarez DO   gabapentin (NEURONTIN) 300 MG capsule Take 1 capsule by mouth 3 times daily for 30 days.  5/28/20 6/27/20 Yes Jennifer Alvarez,    insulin glargine (LANTUS) 100 UNIT/ML injection vial Inject 40 Units into the skin 2 times daily 5/27/20  Yes Jennifer Alvarez, DO   insulin lispro (HUMALOG) 100 UNIT/ML injection vial INJECT 30 UNITS INTO THE SKIN 3 TIMES DAILY (BEFORE MEALS) 5/26/20  Yes Saw Alvarez, DO   glucose monitoring kit (FREESTYLE) monitoring kit Use once. 5/26/20  Yes Saw Alvarez DO   clindamycin (CLEOCIN) 300 MG capsule Take 1 capsule by mouth 3 times daily for 7 days 5/26/20 6/2/20 Yes Didier Alvarez DO   traZODone (DESYREL) 50 MG tablet TAKE 1 TABLET BY MOUTH EVERY DAY AT BEDTIME AS NEEDED FOR SLEEP 5/18/20  Yes Jennifer Alvarez DO   bumetanide (BUMEX) 1 MG tablet TAKE 1 TABLET BY MOUTH TWICE A DAY 5/14/20  Yes Didier Alvarez DO   ipratropium-albuterol (DUONEB) 0.5-2.5 (3) MG/3ML SOLN nebulizer solution USE 1 VIAL VIA NEBULIZER EVERY 4 HOURS AS NEEDED FOR SHORTNESS OF BREATH 5/14/20  Yes Jennifer Alvarez DO   dilTIAZem (CARDIZEM CD) 240 MG extended release capsule TAKE 1 CAPSULE BY MOUTH TWICE A DAY 5/5/20  Yes Saw Alvarez, DO   Insulin Pen Needle 30G X 8 MM MISC 1 each by Does not apply route daily 3/3/20  Yes [address], DO   blood glucose monitor strips Test 4 times a day & as needed for symptoms of irregular blood glucose.  2/26/20  Yes Chi Amanda, DO   vitamin D (ERGOCALCIFEROL) 1.25 MG (48007 UT) CAPS capsule TAKE 1 CAPSULE BY MOUTH TWICE WEEKLY 1/24/20  Yes Saw Alvarez,    ferrous sulfate 325 (65 Fe) MG tablet TAKE 1 TABLET BY MOUTH EVERY DAY WITH BREAKFAST 1/24/20  Yes Jennifer Alvarez DO   montelukast (SINGULAIR) 10 MG tablet Take 1 tablet by mouth nightly 1/23/20  Yes Jennifer Alvarez DO   escitalopram (LEXAPRO) 20 MG tablet TAKE 1 TABLET BY MOUTH DAILY 1/23/20  Yes Saw Alvarez DO   apixaban (ELIQUIS) 5 MG TABS tablet TAKE 1 TABLET BY MOUTH TWICE A DAY 1/23/20  Yes Saw Alvarez,    albuterol sulfate  (90 Base) MCG/ACT inhaler Inhale 2 puffs into the lungs 4 times daily as needed for Wheezing 1/23/20  Yes [address], DO   blood glucose test strips (ASCENSIA AUTODISC VI;ONE TOUCH ULTRA TEST VI) strip Use 4 times daily 20  Yes Nikole Alvarez, DO   spironolactone (ALDACTONE) 25 MG tablet Take 1 tablet by mouth daily 19  Yes Nikole Alvarez, DO   nystatin (MYCOSTATIN) 795572 UNIT/GM cream Apply topically 2 times daily. 19  Yes Alexy Adam., DO   lansoprazole (PREVACID) 30 MG delayed release capsule Take 1 capsule by mouth daily 19  Yes Nikole Alvarez DO   Icosapent Ethyl (VASCEPA) 1 g CAPS capsule Take 2 capsules by mouth 2 times daily 19  Yes Angie Alvarez, DO   ULTICARE ALCOHOL SWABS 70 % PADS USE 3 TIMES A DAY 10/14/19  Yes Historical Provider, MD   BD INSULIN SYRINGE U/F 31G X 5/16\" 1 ML MISC USE AS DIRECTED 10/18/19  Yes Historical Provider, MD   Insulin Pen Needle 31G X 8 MM MISC 1 each by Does not apply route daily 19  Yes Angie Alvarez, DO   miconazole (MICOTIN) 2 % powder Apply topically 2 times daily. 19  Yes Tyler Gayle MD   metoprolol succinate (TOPROL XL) 200 MG extended release tablet Take 0.5 tablets by mouth 2 times daily 19  Yes Chase Saucedo, DO   acetaminophen (TYLENOL) 325 MG tablet Take 650 mg by mouth every 6 hours as needed for Pain   Yes Historical Provider, MD   glucose (GLUTOSE) 40 % GEL Take 37.5 mLs by mouth as needed (hypoglycemia) 18  Yes RAUL Callejas CNP   OXYGEN Inhale 4 L into the lungs continuous    Yes Historical Provider, MD   Elastic Bandages & Supports MISC 20-30 mmHg bilateral compression stockings  Size to fit  Dx:  Edema  Knee high 17  Yes Nessa Medeiros MD       OBJECTIVE:  /60   Pulse 84   Temp 97.6 °F (36.4 °C) (Temporal)   Resp 15   Ht 5' 3\" (1.6 m)   Wt 250 lb (113.4 kg)   LMP  (LMP Unknown)   SpO2 99%   BMI 44.29 kg/m²   Temp  Av.6 °F (36.4 °C)  Min: 97.5 °F (36.4 °C)  Max: 97.6 °F (36.4 °C)  Skin: Warm and dry. No rashes were noted.   Right gluteal wound dressed. HEENT: Round and reactive pupils. Moist mucous membranes. No ulcerations or thrush. Neck: Supple to movements. Chest: No use of accessory muscles to breathe. Symmetrical expansion. No wheezing, crackles or rhonchi. Cardiovascular: Reguar rate and rhythm. No murmurs gallops, or rubs appreciated. Abdomen: Bowel sounds present, nontender, nondistended, no masses or hepatosplenomegaly. Extremities: Bilateral lower extremity dressed  Lines: peripheral    I/O last 3 completed shifts:   In: 720 [P.O.:720]  Out: 3150 [Urine:3150]      Laboratory and Tests Review:      Lab Results   Component Value Date    WBC 5.2 06/01/2020    WBC 7.7 05/29/2020    WBC 8.4 05/28/2020    HGB 8.9 (L) 06/01/2020    HCT 29.7 (L) 06/01/2020    MCV 83.4 06/01/2020     06/01/2020     Lab Results   Component Value Date    NEUTROABS 2.71 06/01/2020    NEUTROABS 5.72 05/29/2020    NEUTROABS 6.60 05/28/2020     No results found for: [facility]  Lab Results   Component Value Date    ALT 6 05/28/2020    AST 8 05/28/2020    ALKPHOS 109 (H) 05/28/2020    BILITOT <0.2 05/28/2020     Lab Results   Component Value Date     06/01/2020    K 4.0 06/01/2020    CL 92 06/01/2020    CO2 35 06/01/2020    BUN 17 06/01/2020    CREATININE 0.7 06/01/2020    CREATININE 0.7 05/30/2020    CREATININE 0.6 05/29/2020    GFRAA >60 06/01/2020    LABGLOM >60 06/01/2020    GLUCOSE 189 06/01/2020    GLUCOSE 181 12/16/2011    PROT 7.1 05/28/2020    LABALBU 3.4 05/28/2020    LABALBU 4.5 11/02/2011    CALCIUM 9.2 06/01/2020    BILITOT <0.2 05/28/2020    ALKPHOS 109 05/28/2020    AST 8 05/28/2020    ALT 6 05/28/2020     Lab Results   Component Value Date    CRP 14.0 (H) 05/29/2020    CRP 2.2 (H) 12/24/2019    CRP 2.0 (H) 09/22/2019     Lab Results   Component Value Date    SEDRATE 75 (H) 05/29/2020    SEDRATE 41 (H) 12/24/2019    SEDRATE 38 (H) 09/22/2019       Radiology:    CT ABDOMEN W WO CONTRAST Additional Contrast? Radiologist Recommendation   Final Result   1. CT scan washout curve evaluation of the right adrenal gland washout   demonstrate a undetermined characterization lesion. Due to the   increased size since the previous study of April 2019 this is a   suspicious lesion. 2. See above comments and recommendations. CT ABDOMEN PELVIS W IV CONTRAST Additional Contrast? None   Final Result   1. Findings for dermatocellulitis of the subtenon soft tissues of the   more posterior inferior medial aspect of the right gluteal region. The   no conspicuous extension into the very spaces. The no abscess   formation seen presently. No air in the soft tissues. 2. Splenomegaly with borderline size liver to be correlate clinically. 3. Further increased size of the right adrenal gland no measuring 3.4   x 3 cm. The final interpretation can require correlation with   histopathology. Please consider consultation with   endocrinology/surgery on routine bases. ALERT:  ABNORMAL REPORT. Som Urbina US DUP LOWER EXTREMITIES BILATERAL VENOUS   Final Result      No evidence for deep vein thrombosis of the lower extremities to the   level of the knee. XR CHEST PORTABLE   Final Result      No evidence for acute cardiopulmonary process.                 Microbiology:   Lab Results   Component Value Date    BC 24 Hours- no growth 05/28/2020    BC 5 Days- no growth 12/24/2019    BC 5 Days- no growth 09/22/2019    ORG Candida albicans 11/30/2018    ORG Pseudomonas aeruginosa 11/30/2018    ORG Escherichia coli 11/30/2018    ORG Strep agalactiae (Beta Strep Group B) 11/30/2018    ORG Gram negative yogesh 11/30/2018     Lab Results   Component Value Date    BLOODCULT2 24 Hours- no growth 05/28/2020    BLOODCULT2 5 Days- no growth 12/24/2019    BLOODCULT2 5 Days- no growth 09/22/2019    ORG Candida albicans 11/30/2018    ORG Pseudomonas aeruginosa 11/30/2018    ORG Escherichia coli 11/30/2018    ORG Strep agalactiae (Beta Strep Group B) 11/30/2018    ORG Gram negative yogesh 11/30/2018     WOUND/ABSCESS   Date Value Ref Range Status   09/22/2019   Final    Mixed orin isolated. Further workup and sensitivity testing  is not routinely indicated and will not be performed. Mixed orin isolated includes:  Proteus species  Oxidase positive Gram negative rods  Mixed Gram negative rods  Corynebacteria  Strep species     11/29/2018   Final    Mixed orin isolated. Further workup and sensitivity testing  is not routinely indicated and will not be performed. Mixed orin isolated includes:  Gram negative rods  Oxidase positive Gram negative rods  Beta hemolytic Strep species (Group B)     10/11/2018 Heavy growth  Final   10/11/2018 Heavy growth  Final   10/11/2018 Heavy growth  Final   10/11/2018 Heavy growth  Final     No results found for: RESPSMEAR      Component Value Date/Time    AFBCX No growth after 6 weeks of incubation. 11/30/2018 1458    FUNGSM No Fungal elements seen 11/30/2018 1458    LABFUNG Rare growth  No further workup   11/30/2018 1458     No results found for: CULTRESP  No results found for: CXCATHTIP  No results found for: [facility]  Culture Surgical   Date Value Ref Range Status   11/30/2018   Final    Light growth  Pandoraea species  No antibiotic susceptibility studies performed. Plates will be held 10  days. Contact the laboratory, [phone], if send out is desired. 11/30/2018 Heavy growth  Final   11/30/2018   Final    Heavy growth  This organism possesses an Extended Spectrum Beta Lactamase  that is inhibited by Clavulanic Acid.      11/30/2018 Light growth  Final     Creatinine Ur POCT   Date Value Ref Range Status   05/16/2018 100  Final     Urine Culture, Routine   Date Value Ref Range Status   11/27/2018 Growth not present  Final   10/13/2018 Growth not present  Final   05/16/2018 Growth not present  Final     No results found for: [facility]    Problem list:    Active Problems:    Abscess and cellulitis of gluteal region  Resolved Problems:    * No resolved hospital problems. *      ASSESSMENT:    Right gluteal celluliits CT shows no abscess and no air   Chronic right buttock wound --- status post incision and drainage of abscess at right gluteal cleft 5/31/2020  History of anal fissure   Chronic lymphedema   Bilateral lower extremity chronic skin changes/lymphedema   Afebrile   Plan:   Cont vanco and zosyn   Pharmacy to dose vancomycin   Check cultures   Baseline ESR-75, CRP-14 (05/30/2020)   Continue local wound care   Wound care help appreciated   Monitor labs   Status post incision and drainage of abscess right gluteal cleft 5/31/2025 by general surgery-no cultures were sent  General surgery has no further surgical plans at this time.   There were no cultures done at the time of the I and D yesterday   My bias would be at least ten days of iv antibiotics total .  Which would be until June 7   I feel uncomfortable having her on pills    This in my opinion would be optimal therapy given no cultures were done    Aramis Knowles APRN-FNP    10:17 AM  6/1/2020

## 2020-06-01 NOTE — PROGRESS NOTES
General Surgery Progress Note:    CC: abscess    S: doing well       Objective:  @/60   Pulse 84   Temp 97.6 °F (36.4 °C) (Temporal)   Resp 15   Ht 5' 3\" (1.6 m)   Wt 250 lb (113.4 kg)   LMP  (LMP Unknown)   SpO2 99%   BMI 44.29 kg/m² @    Physical -     Gen: NAD  Resp: Breathing Comfortably, no resp distress  CV: Reg Rate  Abd: obese   Buttocks; abscess packed, erythema and induration mild medial gluteal fold just lateral t  cleft.      Assessment/Plan: s/p I+D buttock abscess     - local wound care BID,   - ok to restart  eliquis   - Ok to d/c Fu Surgery PRN       Dany Bynum MD FACS     9:24 AM

## 2020-06-01 NOTE — PROGRESS NOTES
suggested    Medications:  REVIEWED DAILY    Infusion Medications    dextrose       Scheduled Medications    insulin regular human  55 Units Subcutaneous TID WC    insulin regular human  10 Units Subcutaneous Once    miconazole   Topical BID    piperacillin-tazobactam  3.375 g Intravenous Q8H    And    sodium chloride  25 mL Intravenous Q8H    apixaban  5 mg Oral BID    bumetanide  1 mg Oral BID    dilTIAZem  240 mg Oral Daily    escitalopram  20 mg Oral Daily    ferrous sulfate  325 mg Oral Daily with breakfast    gabapentin  300 mg Oral TID    pantoprazole  40 mg Oral QAM AC    metoprolol succinate  100 mg Oral BID    montelukast  10 mg Oral Nightly    spironolactone  25 mg Oral Daily    vitamin D  50,000 Units Oral Weekly    vancomycin  1,250 mg Intravenous Q12H    omega-3 acid ethyl esters  2 g Oral BID     PRN Meds: glucose, dextrose, glucagon (rDNA), dextrose, oxyCODONE-acetaminophen, acetaminophen, albuterol, magic (miracle) mouthwash, traZODone, diphenhydrAMINE    Labs:     Recent Labs     06/01/20  0713   WBC 5.2   HGB 8.9*   HCT 29.7*          Recent Labs     05/30/20  0645 06/01/20  0713   * 138   K 4.6 4.0   CL 91* 92*   CO2 29 35*   BUN 15 17   CREATININE 0.7 0.7   CALCIUM 8.7 9.2       No results for input(s): PROT, ALB, ALKPHOS, ALT, AST, BILITOT, AMYLASE, LIPASE in the last 72 hours. Recent Labs     05/31/20  1343   INR 1.1       No results for input(s): Halle Beat in the last 72 hours.     Chronic labs:    Lab Results   Component Value Date    CHOL 287 (H) 05/26/2020    TRIG 512 (H) 05/26/2020    HDL 33 05/26/2020    LDLCALC - (AA) 05/26/2020    TSH 2.240 05/26/2020    INR 1.1 05/31/2020    LABA1C 17.1 (H) 05/26/2020       Radiology: REVIEWED DAILY    +++++++++++++++++++++++++++++++++++++++++++++++++  Mitch [place]ch[place]  +++++++++++++++++++++++++++++++++++++++++++++++++  NOTE: This report was transcribed using voice recognition software. Every effort was made to ensure accuracy; however, inadvertent computerized transcription errors may be present.

## 2020-06-01 NOTE — PROGRESS NOTES
ENDOCRINOLOGY TELEHEALTH PROGRESS NOTE      Date of Service: 6/1/2020  Date of Admission: 5/28/2020  Admitting Physician: Matt Conklin MD   Primary Care Physician: Gabby Israel DO  Consultant physician: Sanna Gavin MD     Reason for the consultation:  Poorly controlled DM type 2, Rt side adrenal mass     History of Present Illness:  Jenifer Balderas is a 75 y.o. old female with extensive PMH including DM type 2 on insulin, chronic right buttock wound , COPD, HTN, morbid obesity, and others listed belo admitted to [facility] on 5/28/2020 because of chronic coccyx wound with increasing pain and infection, endocrine team was consulted for diabetes management and evaluation of adrenal mass    Subjective:  No overnight major events. Appetite is good, BG improving.  Switched to U-500 insulin yesterday      Inpatient diet:   Carb Restricted diet     Point of care glucose monitoring:   Independently reviewed   Recent Labs     05/30/20  1107 05/30/20  1604 05/30/20  2140 05/31/20  0548 05/31/20  1112 05/31/20  1610 05/31/20  2113 06/01/20  0740   GLUMET 475* 386* 307* 373* 265* 157* 77 212*     Scheduled Meds:   insulin regular human  45 Units Subcutaneous TID WC    miconazole   Topical BID    piperacillin-tazobactam  3.375 g Intravenous Q8H    And    sodium chloride  25 mL Intravenous Q8H    [Held by provider] apixaban  5 mg Oral BID    bumetanide  1 mg Oral BID    dilTIAZem  240 mg Oral Daily    escitalopram  20 mg Oral Daily    ferrous sulfate  325 mg Oral Daily with breakfast    gabapentin  300 mg Oral TID    pantoprazole  40 mg Oral QAM AC    metoprolol succinate  100 mg Oral BID    montelukast  10 mg Oral Nightly    spironolactone  25 mg Oral Daily    vitamin D  50,000 Units Oral Weekly    vancomycin  1,250 mg Intravenous Q12H    omega-3 acid ethyl esters  2 g Oral BID     PRN Meds:   glucose, 15 g, PRN  dextrose, 12.5 g, PRN  glucagon (rDNA), 1 mg, PRN  dextrose, 100 mL/hr, PRN  oxyCODONE-acetaminophen, 1 tablet, Q6H PRN  acetaminophen, 650 mg, Q6H PRN  albuterol, 2.5 mg, Q4H PRN  magic (miracle) mouthwash, 5 mL, 4x Daily PRN  traZODone, 50 mg, Nightly PRN  diphenhydrAMINE, 25 mg, Q6H PRN      Continuous Infusions:   dextrose         Review of Systems  All systems reviewed. All negative except for symptoms mentioned in HPI     OBJECTIVE    /60   Pulse 84   Temp 97.6 °F (36.4 °C) (Temporal)   Resp 15   Ht 5' 3\" (1.6 m)   Wt 250 lb (113.4 kg)   LMP  (LMP Unknown)   SpO2 99%   BMI 44.29 kg/m²     Intake/Output Summary (Last 24 hours) at 6/1/2020 1023  Last data filed at 6/1/2020 0914  Gross per 24 hour   Intake 840 ml   Output 3150 ml   Net -2310 ml       Due to this being a TeleHealth encounter, evaluation of the following organ systems is limited:   EENT/Resp/CV/GI//MS/Neuro/Skin/Heme-Lymph-Imm. General: awake alert, oriented x3, no abnormal position or movements.   Pulm: move with respiration   Skin: no rash  Psych: normal mood, and affect    Review of Laboratory Data:  I have reviewed the following:  Recent Labs     06/01/20  0713   WBC 5.2   RBC 3.56   HGB 8.9*   HCT 29.7*   MCV 83.4   MCH 25.0*   MCHC 30.0*   RDW 14.3      MPV 9.3     Recent Labs     05/30/20  0645 06/01/20  0713   * 138   K 4.6 4.0   CL 91* 92*   CO2 29 35*   BUN 15 17   CREATININE 0.7 0.7   GLUCOSE 625* 189*   CALCIUM 8.7 9.2     Beta-Hydroxybutyrate   Date Value Ref Range Status   05/29/2020 0.18 0.02 - 0.27 mmol/L Final   05/28/2020 0.04 0.02 - 0.27 mmol/L Final   10/13/2018 0.06 0.02 - 0.27 mmol/L Final     Lab Results   Component Value Date    LABA1C 17.1 05/26/2020    LABA1C 11.7 01/23/2020    LABA1C 11.5 11/07/2019     Lab Results   Component Value Date/Time    TSH 2.240 05/26/2020 12:00 PM    T4FREE 1.22 05/12/2017 02:52 PM     Lab Results   Component Value Date    LABA1C 17.1 05/26/2020    GLUCOSE 189 06/01/2020    GLUCOSE 181 12/16/2011    MALBCR <30 05/16/2018    LABMICR <12.0 05/12/2017    LABCREA 11 05/26/2018     Lab Results   Component Value Date    TRIG 512 05/26/2020    HDL 33 05/26/2020    LDLCALC - 05/26/2020    CHOL 287 05/26/2020       Blood culture   Lab Results   Component Value Date    BC 24 Hours- no growth 05/28/2020    BC 5 Days- no growth 12/24/2019       Radiology:  CT ABDOMEN W WO CONTRAST Additional Contrast? Radiologist Recommendation   Final Result   1. CT scan washout curve evaluation of the right adrenal gland washout   demonstrate a undetermined characterization lesion. Due to the   increased size since the previous study of April 2019 this is a   suspicious lesion. 2. See above comments and recommendations. CT ABDOMEN PELVIS W IV CONTRAST Additional Contrast? None   Final Result   1. Findings for dermatocellulitis of the subtenon soft tissues of the   more posterior inferior medial aspect of the right gluteal region. The   no conspicuous extension into the very spaces. The no abscess   formation seen presently. No air in the soft tissues. 2. Splenomegaly with borderline size liver to be correlate clinically. 3. Further increased size of the right adrenal gland no measuring 3.4   x 3 cm. The final interpretation can require correlation with   histopathology. Please consider consultation with   endocrinology/surgery on routine bases. ALERT:  ABNORMAL REPORT. Apolinar Glow US DUP LOWER EXTREMITIES BILATERAL VENOUS   Final Result      No evidence for deep vein thrombosis of the lower extremities to the   level of the knee. XR CHEST PORTABLE   Final Result      No evidence for acute cardiopulmonary process.                 Medical Records/Labs/Images review:   I personally reviewed and summarized previous records   All labs and imaging were reviewed independently     Chris Preston, a 75 y.o.-old female seen in the hospital today    Uncontrolled Diabetes Mellitus type 2   · A1c 17.1%   · Switched to U-500

## 2020-06-01 NOTE — PROGRESS NOTES
Messaged  to notify that infectious disease recommends IV vanc and zosyn on discharge, will need picc. Patient not ready for discharge, asked for downgrade.

## 2020-06-01 NOTE — PLAN OF CARE
Problem: Falls - Risk of:  Goal: Will remain free from falls  Description: Will remain free from falls  6/1/2020 0434 by Silviano Valdez RN  Outcome: Met This Shift  5/31/2020 1458 by Homar Shen RN  Outcome: Met This Shift     Problem: Falls - Risk of:  Goal: Absence of physical injury  Description: Absence of physical injury  6/1/2020 0434 by Silviano Valdez RN  Outcome: Met This Shift  5/31/2020 1458 by Homar Shen RN  Outcome: Met This Shift     Problem: Skin Integrity:  Goal: Will show no infection signs and symptoms  Description: Will show no infection signs and symptoms  Outcome: Met This Shift     Problem: Skin Integrity:  Goal: Absence of new skin breakdown  Description: Absence of new skin breakdown  Outcome: Met This Shift     Problem: Pain:  Goal: Pain level will decrease  Description: Pain level will decrease  6/1/2020 0434 by Silviano Valdez RN  Outcome: Met This Shift  5/31/2020 1458 by Homar Shen RN  Outcome: Not Met This Shift     Problem: Pain:  Goal: Control of acute pain  Description: Control of acute pain  Outcome: Met This Shift

## 2020-06-02 VITALS
RESPIRATION RATE: 15 BRPM | HEART RATE: 89 BPM | DIASTOLIC BLOOD PRESSURE: 56 MMHG | OXYGEN SATURATION: 99 % | BODY MASS INDEX: 44.3 KG/M2 | WEIGHT: 250 LBS | TEMPERATURE: 97.5 F | HEIGHT: 63 IN | SYSTOLIC BLOOD PRESSURE: 101 MMHG

## 2020-06-02 LAB
ANION GAP SERPL CALCULATED.3IONS-SCNC: 12 MMOL/L (ref 7–16)
BASOPHILS ABSOLUTE: 0.05 E9/L (ref 0–0.2)
BASOPHILS RELATIVE PERCENT: 1.3 % (ref 0–2)
BUN BLDV-MCNC: 16 MG/DL (ref 8–23)
CALCIUM SERPL-MCNC: 9.3 MG/DL (ref 8.6–10.2)
CHLORIDE BLD-SCNC: 92 MMOL/L (ref 98–107)
CO2: 36 MMOL/L (ref 22–29)
CREAT SERPL-MCNC: 0.7 MG/DL (ref 0.5–1)
EOSINOPHILS ABSOLUTE: 0.13 E9/L (ref 0.05–0.5)
EOSINOPHILS RELATIVE PERCENT: 3.3 % (ref 0–6)
GFR AFRICAN AMERICAN: >60
GFR NON-AFRICAN AMERICAN: >60 ML/MIN/1.73
GLUCOSE BLD-MCNC: 181 MG/DL (ref 74–99)
HCT VFR BLD CALC: 30.8 % (ref 34–48)
HEMOGLOBIN: 9.2 G/DL (ref 11.5–15.5)
IMMATURE GRANULOCYTES #: 0.08 E9/L
IMMATURE GRANULOCYTES %: 2 % (ref 0–5)
LYMPHOCYTES ABSOLUTE: 1.57 E9/L (ref 1.5–4)
LYMPHOCYTES RELATIVE PERCENT: 39.3 % (ref 20–42)
MCH RBC QN AUTO: 24.8 PG (ref 26–35)
MCHC RBC AUTO-ENTMCNC: 29.9 % (ref 32–34.5)
MCV RBC AUTO: 83 FL (ref 80–99.9)
METER GLUCOSE: 157 MG/DL (ref 74–99)
METER GLUCOSE: 181 MG/DL (ref 74–99)
METER GLUCOSE: 340 MG/DL (ref 74–99)
METER GLUCOSE: 417 MG/DL (ref 74–99)
MONOCYTES ABSOLUTE: 0.47 E9/L (ref 0.1–0.95)
MONOCYTES RELATIVE PERCENT: 11.8 % (ref 2–12)
NEUTROPHILS ABSOLUTE: 1.7 E9/L (ref 1.8–7.3)
NEUTROPHILS RELATIVE PERCENT: 42.3 % (ref 43–80)
PDW BLD-RTO: 14.4 FL (ref 11.5–15)
PLATELET # BLD: 329 E9/L (ref 130–450)
PMV BLD AUTO: 9.2 FL (ref 7–12)
POTASSIUM REFLEX MAGNESIUM: 4.3 MMOL/L (ref 3.5–5)
RBC # BLD: 3.71 E12/L (ref 3.5–5.5)
SARS-COV-2, NAAT: NOT DETECTED
SODIUM BLD-SCNC: 140 MMOL/L (ref 132–146)
WBC # BLD: 4 E9/L (ref 4.5–11.5)

## 2020-06-02 PROCEDURE — 82962 GLUCOSE BLOOD TEST: CPT

## 2020-06-02 PROCEDURE — U0002 COVID-19 LAB TEST NON-CDC: HCPCS

## 2020-06-02 PROCEDURE — 97165 OT EVAL LOW COMPLEX 30 MIN: CPT

## 2020-06-02 PROCEDURE — 99232 SBSQ HOSP IP/OBS MODERATE 35: CPT | Performed by: INTERNAL MEDICINE

## 2020-06-02 PROCEDURE — 6360000002 HC RX W HCPCS: Performed by: FAMILY MEDICINE

## 2020-06-02 PROCEDURE — 97530 THERAPEUTIC ACTIVITIES: CPT

## 2020-06-02 PROCEDURE — 85025 COMPLETE CBC W/AUTO DIFF WBC: CPT

## 2020-06-02 PROCEDURE — 97162 PT EVAL MOD COMPLEX 30 MIN: CPT

## 2020-06-02 PROCEDURE — 36415 COLL VENOUS BLD VENIPUNCTURE: CPT

## 2020-06-02 PROCEDURE — 2580000003 HC RX 258: Performed by: FAMILY MEDICINE

## 2020-06-02 PROCEDURE — 6370000000 HC RX 637 (ALT 250 FOR IP): Performed by: FAMILY MEDICINE

## 2020-06-02 PROCEDURE — 94640 AIRWAY INHALATION TREATMENT: CPT

## 2020-06-02 PROCEDURE — 80048 BASIC METABOLIC PNL TOTAL CA: CPT

## 2020-06-02 RX ORDER — LEVOFLOXACIN 750 MG/1
750 TABLET ORAL DAILY
Qty: 10 TABLET | Refills: 0 | Status: SHIPPED | OUTPATIENT
Start: 2020-06-02 | End: 2020-06-12

## 2020-06-02 RX ORDER — AMOXICILLIN AND CLAVULANATE POTASSIUM 600; 42.9 MG/5ML; MG/5ML
1800 POWDER, FOR SUSPENSION ORAL 2 TIMES DAILY
Qty: 300 ML | Refills: 0 | Status: SHIPPED | OUTPATIENT
Start: 2020-06-02 | End: 2020-06-12

## 2020-06-02 RX ORDER — SODIUM CHLORIDE 0.9 % (FLUSH) 0.9 %
10 SYRINGE (ML) INJECTION PRN
Status: DISCONTINUED | OUTPATIENT
Start: 2020-06-02 | End: 2020-06-02 | Stop reason: HOSPADM

## 2020-06-02 RX ORDER — LIDOCAINE HYDROCHLORIDE 10 MG/ML
5 INJECTION, SOLUTION EPIDURAL; INFILTRATION; INTRACAUDAL; PERINEURAL ONCE
Status: DISCONTINUED | OUTPATIENT
Start: 2020-06-02 | End: 2020-06-02 | Stop reason: HOSPADM

## 2020-06-02 RX ORDER — HEPARIN SODIUM (PORCINE) LOCK FLUSH IV SOLN 100 UNIT/ML 100 UNIT/ML
3 SOLUTION INTRAVENOUS PRN
Status: DISCONTINUED | OUTPATIENT
Start: 2020-06-02 | End: 2020-06-02 | Stop reason: HOSPADM

## 2020-06-02 RX ORDER — HEPARIN SODIUM (PORCINE) LOCK FLUSH IV SOLN 100 UNIT/ML 100 UNIT/ML
3 SOLUTION INTRAVENOUS EVERY 12 HOURS SCHEDULED
Status: DISCONTINUED | OUTPATIENT
Start: 2020-06-02 | End: 2020-06-02 | Stop reason: HOSPADM

## 2020-06-02 RX ADMIN — VANCOMYCIN HYDROCHLORIDE 1250 MG: 10 INJECTION, POWDER, LYOPHILIZED, FOR SOLUTION INTRAVENOUS at 09:00

## 2020-06-02 RX ADMIN — GABAPENTIN 300 MG: 300 CAPSULE ORAL at 14:10

## 2020-06-02 RX ADMIN — METOPROLOL SUCCINATE 100 MG: 100 TABLET, EXTENDED RELEASE ORAL at 09:01

## 2020-06-02 RX ADMIN — OXYCODONE AND ACETAMINOPHEN 1 TABLET: 5; 325 TABLET ORAL at 02:59

## 2020-06-02 RX ADMIN — MICONAZOLE NITRATE: 20.6 POWDER TOPICAL at 09:06

## 2020-06-02 RX ADMIN — SODIUM CHLORIDE 25 ML: 9 INJECTION, SOLUTION INTRAVENOUS at 14:29

## 2020-06-02 RX ADMIN — ALBUTEROL SULFATE 2.5 MG: 2.5 SOLUTION RESPIRATORY (INHALATION) at 03:10

## 2020-06-02 RX ADMIN — FERROUS SULFATE TAB 325 MG (65 MG ELEMENTAL FE) 325 MG: 325 (65 FE) TAB at 09:01

## 2020-06-02 RX ADMIN — SODIUM CHLORIDE 25 ML: 9 INJECTION, SOLUTION INTRAVENOUS at 06:53

## 2020-06-02 RX ADMIN — ESCITALOPRAM 20 MG: 10 TABLET, FILM COATED ORAL at 09:00

## 2020-06-02 RX ADMIN — ACETAMINOPHEN 650 MG: 325 TABLET, FILM COATED ORAL at 11:45

## 2020-06-02 RX ADMIN — OMEGA-3-ACID ETHYL ESTERS 2 G: 1 CAPSULE, LIQUID FILLED ORAL at 09:00

## 2020-06-02 RX ADMIN — GABAPENTIN 300 MG: 300 CAPSULE ORAL at 09:01

## 2020-06-02 RX ADMIN — DILTIAZEM HYDROCHLORIDE 240 MG: 240 CAPSULE, EXTENDED RELEASE ORAL at 09:01

## 2020-06-02 RX ADMIN — OXYCODONE AND ACETAMINOPHEN 1 TABLET: 5; 325 TABLET ORAL at 17:07

## 2020-06-02 RX ADMIN — PIPERACILLIN AND TAZOBACTAM 3.38 G: 3; .375 INJECTION, POWDER, FOR SOLUTION INTRAVENOUS at 02:59

## 2020-06-02 RX ADMIN — PIPERACILLIN AND TAZOBACTAM 3.38 G: 3; .375 INJECTION, POWDER, FOR SOLUTION INTRAVENOUS at 10:37

## 2020-06-02 RX ADMIN — PANTOPRAZOLE SODIUM 40 MG: 40 TABLET, DELAYED RELEASE ORAL at 06:51

## 2020-06-02 RX ADMIN — SPIRONOLACTONE 25 MG: 25 TABLET ORAL at 10:37

## 2020-06-02 RX ADMIN — OXYCODONE AND ACETAMINOPHEN 1 TABLET: 5; 325 TABLET ORAL at 09:01

## 2020-06-02 RX ADMIN — APIXABAN 5 MG: 5 TABLET, FILM COATED ORAL at 09:01

## 2020-06-02 RX ADMIN — BUMETANIDE 1 MG: 1 TABLET ORAL at 09:00

## 2020-06-02 ASSESSMENT — PAIN SCALES - GENERAL
PAINLEVEL_OUTOF10: 2
PAINLEVEL_OUTOF10: 5
PAINLEVEL_OUTOF10: 5
PAINLEVEL_OUTOF10: 4
PAINLEVEL_OUTOF10: 2
PAINLEVEL_OUTOF10: 6
PAINLEVEL_OUTOF10: 3
PAINLEVEL_OUTOF10: 5
PAINLEVEL_OUTOF10: 9

## 2020-06-02 ASSESSMENT — PAIN DESCRIPTION - ORIENTATION: ORIENTATION: RIGHT;LEFT

## 2020-06-02 ASSESSMENT — PAIN DESCRIPTION - FREQUENCY
FREQUENCY: CONTINUOUS
FREQUENCY: CONTINUOUS

## 2020-06-02 ASSESSMENT — PAIN DESCRIPTION - PAIN TYPE
TYPE: CHRONIC PAIN
TYPE: CHRONIC PAIN

## 2020-06-02 ASSESSMENT — PAIN DESCRIPTION - LOCATION
LOCATION: GENERALIZED
LOCATION: LEG;FOOT

## 2020-06-02 ASSESSMENT — PAIN - FUNCTIONAL ASSESSMENT: PAIN_FUNCTIONAL_ASSESSMENT: PREVENTS OR INTERFERES SOME ACTIVE ACTIVITIES AND ADLS

## 2020-06-02 ASSESSMENT — PAIN DESCRIPTION - DESCRIPTORS
DESCRIPTORS: ACHING;CRAMPING
DESCRIPTORS: ACHING;CRAMPING

## 2020-06-02 NOTE — CARE COORDINATION
Messaged attending with regard to delay in discharge, and requested general floor transfer while awaiting pre-cert via [facility].

## 2020-06-02 NOTE — CARE COORDINATION
Received call back from Su Gaytan pt has been accepted and will take without pre-cert, transport set-up for 6 pm via [email or web address], charge RN Hilliard Buerger, and pt notified.

## 2020-06-02 NOTE — PROGRESS NOTES
ENDOCRINOLOGY TELEHEALTH PROGRESS NOTE      Date of Service: 6/2/2020  Date of Admission: 5/28/2020  Admitting Physician: Vijaya Templeton MD   Primary Care Physician: Iliana Barakat DO  Consultant physician: Maria Antonia Nguyễn MD     Reason for the consultation:  Poorly controlled DM type 2, Rt side adrenal mass     History of Present Illness:  Jose Manuel Walters is a 75 y.o. old female with extensive PMH including DM type 2 on insulin, chronic right buttock wound , COPD, HTN, morbid obesity, and others listed belo admitted to [facility] on 5/28/2020 because of chronic coccyx wound with increasing pain and infection, endocrine team was consulted for diabetes management and evaluation of adrenal mass    Subjective:  No overnight major events. Appetite is good, BG improving.  Switched to U-500 insulin yesterday      Inpatient diet:   Carb Restricted diet     Point of care glucose monitoring:   Independently reviewed   Recent Labs     06/01/20  0740 06/01/20  1117 06/01/20  1639 06/01/20  1830 06/01/20  2124 06/02/20  0650 06/02/20  0904 06/02/20  1100   GLUMET 212* 252* 160* 213* 231* 181* 417* 340*     Scheduled Meds:   insulin regular human  45 Units Subcutaneous TID WC    lidocaine PF  5 mL Intradermal Once    heparin flush  3 mL Intravenous 2 times per day    miconazole   Topical BID    piperacillin-tazobactam  3.375 g Intravenous Q8H    And    sodium chloride  25 mL Intravenous Q8H    apixaban  5 mg Oral BID    bumetanide  1 mg Oral BID    dilTIAZem  240 mg Oral Daily    escitalopram  20 mg Oral Daily    ferrous sulfate  325 mg Oral Daily with breakfast    gabapentin  300 mg Oral TID    pantoprazole  40 mg Oral QAM AC    metoprolol succinate  100 mg Oral BID    montelukast  10 mg Oral Nightly    spironolactone  25 mg Oral Daily    vitamin D  50,000 Units Oral Weekly    vancomycin  1,250 mg Intravenous Q12H    omega-3 acid ethyl esters  2 g Oral BID     PRN Meds:   sodium chloride flush, 10 mL, PRN  heparin flush, 3 mL, PRN  glucose, 15 g, PRN  dextrose, 12.5 g, PRN  glucagon (rDNA), 1 mg, PRN  dextrose, 100 mL/hr, PRN  oxyCODONE-acetaminophen, 1 tablet, Q6H PRN  acetaminophen, 650 mg, Q6H PRN  albuterol, 2.5 mg, Q4H PRN  magic (miracle) mouthwash, 5 mL, 4x Daily PRN  traZODone, 50 mg, Nightly PRN  diphenhydrAMINE, 25 mg, Q6H PRN      Continuous Infusions:   dextrose         Review of Systems  All systems reviewed. All negative except for symptoms mentioned in HPI     OBJECTIVE    BP (!) 101/56   Pulse 89   Temp 97.5 °F (36.4 °C) (Temporal)   Resp 15   Ht 5' 3\" (1.6 m)   Wt 250 lb (113.4 kg)   LMP  (LMP Unknown)   SpO2 99%   BMI 44.29 kg/m²     Intake/Output Summary (Last 24 hours) at 6/2/2020 1513  Last data filed at 6/2/2020 1425  Gross per 24 hour   Intake 2180 ml   Output 3500 ml   Net -1320 ml       Due to this being a TeleHealth encounter, evaluation of the following organ systems is limited:   EENT/Resp/CV/GI//MS/Neuro/Skin/Heme-Lymph-Imm. General: awake alert, oriented x3, no abnormal position or movements.   Pulm: move with respiration   Skin: no rash  Psych: normal mood, and affect    Review of Laboratory Data:  I have reviewed the following:  Recent Labs     06/01/20  0713 06/02/20  0701   WBC 5.2 4.0*   RBC 3.56 3.71   HGB 8.9* 9.2*   HCT 29.7* 30.8*   MCV 83.4 83.0   MCH 25.0* 24.8*   MCHC 30.0* 29.9*   RDW 14.3 14.4    329   MPV 9.3 9.2     Recent Labs     06/01/20  0713 06/02/20  0701    140   K 4.0 4.3   CL 92* 92*   CO2 35* 36*   BUN 17 16   CREATININE 0.7 0.7   GLUCOSE 189* 181*   CALCIUM 9.2 9.3     Beta-Hydroxybutyrate   Date Value Ref Range Status   05/29/2020 0.18 0.02 - 0.27 mmol/L Final   05/28/2020 0.04 0.02 - 0.27 mmol/L Final   10/13/2018 0.06 0.02 - 0.27 mmol/L Final     Lab Results   Component Value Date    LABA1C 17.1 05/26/2020    LABA1C 11.7 01/23/2020    LABA1C 11.5 11/07/2019     Lab Results   Component Value Date/Time    TSH 2.240 05/26/2020 12:00 PM T4FREE 1.22 05/12/2017 02:52 PM     Lab Results   Component Value Date    LABA1C 17.1 05/26/2020    GLUCOSE 181 06/02/2020    GLUCOSE 181 12/16/2011    MALBCR <30 05/16/2018    LABMICR <12.0 05/12/2017    LABCREA 11 05/26/2018     Lab Results   Component Value Date    TRIG 512 05/26/2020    HDL 33 05/26/2020    LDLCALC - 05/26/2020    CHOL 287 05/26/2020       Blood culture   Lab Results   Component Value Date    BC 24 Hours- no growth 05/28/2020    BC 5 Days- no growth 12/24/2019       Radiology:  CT ABDOMEN W WO CONTRAST Additional Contrast? Radiologist Recommendation   Final Result   1. CT scan washout curve evaluation of the right adrenal gland washout   demonstrate a undetermined characterization lesion. Due to the   increased size since the previous study of April 2019 this is a   suspicious lesion. 2. See above comments and recommendations. CT ABDOMEN PELVIS W IV CONTRAST Additional Contrast? None   Final Result   1. Findings for dermatocellulitis of the subtenon soft tissues of the   more posterior inferior medial aspect of the right gluteal region. The   no conspicuous extension into the very spaces. The no abscess   formation seen presently. No air in the soft tissues. 2. Splenomegaly with borderline size liver to be correlate clinically. 3. Further increased size of the right adrenal gland no measuring 3.4   x 3 cm. The final interpretation can require correlation with   histopathology. Please consider consultation with   endocrinology/surgery on routine bases. ALERT:  ABNORMAL REPORT. Benedetta Ou US DUP LOWER EXTREMITIES BILATERAL VENOUS   Final Result      No evidence for deep vein thrombosis of the lower extremities to the   level of the knee. XR CHEST PORTABLE   Final Result      No evidence for acute cardiopulmonary process.                 Medical Records/Labs/Images review:   I personally reviewed and summarized previous records   All labs and imaging were reviewed independently     Chris Preston, a 75 y.o.-old female seen in the hospital today    Uncontrolled Diabetes Mellitus type 2   · A1c 17.1%   · BS was good this morning but spiked before lunch   · Will adjust U-500 insulin regimen as below   ? Humulin U-500 insulin 55 units TID with meals   · Continue glucose check with meals and at bedtime   · Will titrate insulin dose based on the blood glucose trend & insulin requirement  · Will arrange for patient to be seen in endocrinology clinic upon discharge for routine diabetes maintenance and prevention. · Endocrine follow up appointment, Monday 6/22 at 11 am      Rt adrenal mass  · CT adrenal  5/30/2020 --> high Hounsfield units with negative washout makings lesion undetermined. · Given size change and radiological features, will need adrenal biopsy but we have to r/o Pheo first especially with high Hounsfield units. Plasma metanephrine pending   · AM cortisol adequately suppressed following 1 mg Dexamethasone suppression test (DST) making Cushing's syndrome very unlikely   · Pt on Spironolactone and for this reason we didn't check Renin/Patel   · Endocrine follow up appointment, Monday 6/22 at 11 am     Rt Gluteal buttock abscess   · On broad spectrum antibiotics Zosyn/Vanc   · Managed by primary and ID team       The above issues were reviewed with the patient who understood and agreed with the plan. Thank you for allowing us to participate in the care of this patient. Please do not hesitate to contact us with any additional questions. Mohamud Murdock MD  Endocrinologist, [facility]   [address] [place] [id number]   Phone: [phone]  Fax: [phone]  --------------------------------------------  An electronic signature was used to authenticate this note.  Fabian Richmond MD on 6/2/2020 at 3:13 PM    Service was provided through a video synchronous discussion

## 2020-06-02 NOTE — CARE COORDINATION
Called Radha with [facility] ([facility]) (she was checking benefits) however, hadn't heard back, reminded her pt is discharged and we require an acceptance / denial today. Radha to call directly back.

## 2020-06-02 NOTE — PROGRESS NOTES
Hospitalist Progress Note      SYNOPSIS:   Briefly this is a 57-year-old female with a chronic right buttock wound, history of anal fissure, atrial fibrillation, COPD, diabetes mellitus, hypertension, hyperlipidemia, chronic lymphadenitis who presents to the emergency department at [facility] for evaluation of chronic coccyx wound with increasing pain and infection  Patient reports she was on clindamycin until May 26 which was prescribed by her primary care doctor. She reports increased discomfort and pain in the right gluteal region. Patient reports having some fevers and chills and worsening pain around the perirectal area. She reports she has been checking her blood sugars and taking her insulin as prescribed. She also reports she is on Eliquis. She does have chronic lymphedema and has pressure dressings in place. SUBJECTIVE:    Patient seen and examined  Records reviewed. She reports no complaints this morning  She is inquiring about discharge home  I discussed IDs plans about IV antibiotics and she appears to understand that she may be here for another day or 2. Stable overnight. No other overnight issues reported. Temp (24hrs), Av °F (36.1 °C), Min:96.6 °F (35.9 °C), Max:97.4 °F (36.3 °C)    DIET: DIET CARB CONTROL; Carb Control: 4 carb choices (60 gms)/meal  Dietary Nutrition Supplements: Low Calorie High Protein Supplement  Dietary Nutrition Supplements: Wound Healing Oral Supplement  CODE: Prior    Intake/Output Summary (Last 24 hours) at 2020 0851  Last data filed at 2020 0376  Gross per 24 hour   Intake 2000 ml   Output 3100 ml   Net -1100 ml       OBJECTIVE:    /61   Pulse 87   Temp 96.6 °F (35.9 °C) (Temporal)   Resp 18   Ht 5' 3\" (1.6 m)   Wt 250 lb (113.4 kg)   LMP  (LMP Unknown)   SpO2 96%   BMI 44.29 kg/m²     General appearance: No apparent distress, appears stated age and cooperative. HEENT:  Conjunctivae/corneas clear.    Neck: Supple, with full range of motion. No jugular venous distention. Trachea midline. Respiratory: Diminished breath sounds bilaterally without wheezes rales or rhonchi. No respiratory distress. Cardiovascular: Regular rate rhythm with normal S1-S2. She does have chronic lymphedema of bilateral lower extremities. Abdomen: Soft, nontender, nondistended  Musculoskeletal: No clubbing, cyanosis, 3+ swelling bilateral lower extremity, old dressing over with chronic skin changes that appear like tree bark. Brisk capillary refill. Skin: Intertrigo under abdominal pannus. Abscess noted in the right buttock. This is status post I&D by emergency physician on 5/29/2020   Skin:  No rashes  on visible skin  Neurologic: awake, alert and following commands     ASSESSMENT:     Right gluteal buttock abscess  Ambulatory dysfunction  Hyperglycemia  Protein calorie malnutrition, moderate  Chronic microcytic anemia  Very uncontrolled diabetes mellitus, as evidenced by an A1c of 17 (?)  Chronic diastolic heart failure  COPD, stable  Gastroesophageal reflux disease  Hypertension  Hyperlipidemia  History of sinus tachycardia  Chronic lymphadenitis  Anxiety and depression  Right adrenal gland  Chronic respiratory failure with hypoxia    Doppler ultrasound of bilateral lower extremity negative for DVT     CT abdomen pelvis with intermittent cellulitis of the septae none soft tissue of the more posterior inferior medial aspect of right gluteal region. No conspicuous extension, no obvious abscess formation.     CT adrenal per Endo     PLAN:    Patient is tolerating U500 insulin regimen with much controlled sugars. Appreciate endocrinology support. Right adrenal mass-noted recommendations. Cushing's ruled out with dexamethasone suppression test  Right gluteal abscess status post bedside I&D in emergency department at again by general surgery. ID plans on IV antibiotics for at least 10 days. Will need PICC line.   Continue with Bumex 1 mg twice daily orally. BUN and creatinine have been stable. Recommend PT OT evaluation and home health at the time of discharge. Discussed with case management. DISPOSITION: Home Health for IV antibiotics    Medications:  REVIEWED DAILY    Infusion Medications    dextrose       Scheduled Medications    insulin regular human  45 Units Subcutaneous TID WC    miconazole   Topical BID    piperacillin-tazobactam  3.375 g Intravenous Q8H    And    sodium chloride  25 mL Intravenous Q8H    apixaban  5 mg Oral BID    bumetanide  1 mg Oral BID    dilTIAZem  240 mg Oral Daily    escitalopram  20 mg Oral Daily    ferrous sulfate  325 mg Oral Daily with breakfast    gabapentin  300 mg Oral TID    pantoprazole  40 mg Oral QAM AC    metoprolol succinate  100 mg Oral BID    montelukast  10 mg Oral Nightly    spironolactone  25 mg Oral Daily    vitamin D  50,000 Units Oral Weekly    vancomycin  1,250 mg Intravenous Q12H    omega-3 acid ethyl esters  2 g Oral BID     PRN Meds: glucose, dextrose, glucagon (rDNA), dextrose, oxyCODONE-acetaminophen, acetaminophen, albuterol, magic (miracle) mouthwash, traZODone, diphenhydrAMINE    Labs:     Recent Labs     06/01/20  0713 06/02/20  0701   WBC 5.2 4.0*   HGB 8.9* 9.2*   HCT 29.7* 30.8*    329       Recent Labs     06/01/20  0713 06/02/20  0701    140   K 4.0 4.3   CL 92* 92*   CO2 35* 36*   BUN 17 16   CREATININE 0.7 0.7   CALCIUM 9.2 9.3       No results for input(s): PROT, ALB, ALKPHOS, ALT, AST, BILITOT, AMYLASE, LIPASE in the last 72 hours. Recent Labs     05/31/20  1343   INR 1.1       No results for input(s): Zettie Binet in the last 72 hours.     Chronic labs:    Lab Results   Component Value Date    CHOL 287 (H) 05/26/2020    TRIG 512 (H) 05/26/2020    HDL 33 05/26/2020    LDLCALC - (AA) 05/26/2020    TSH 2.240 05/26/2020    INR 1.1 05/31/2020    LABA1C 17.1 (H) 05/26/2020       Radiology: REVIEWED DAILY    +++++++++++++++++++++++++++++++++++++++++++++++++  Alphonso Dickerson  +++++++++++++++++++++++++++++++++++++++++++++++++  NOTE: This report was transcribed using voice recognition software. Every effort was made to ensure accuracy; however, inadvertent computerized transcription errors may be present.

## 2020-06-02 NOTE — DISCHARGE INSTR - COC
Continuity of Care Form    Patient Name: Carmella Luis   :  1955  MRN:  [id number]    Admit date:  2020  Discharge date:  ***    Code Status Order: Prior   Advance Directives:   [address] [place] [address] Documentation     Date/Time Healthcare Directive Type of Healthcare Directive Copy in [facility] [address] Agent's Name Healthcare Agent's Phone Number    [phone]  No, patient does not have an advance directive for healthcare treatment -- -- -- -- --          Admitting Physician:  Francis Villaseñor MD  PCP: Jeri Pandya DO    Discharging Nurse: [facility] Unit/Room#: 8553/8964-O  Discharging Unit Phone Number: ***    Emergency Contact:   Extended Emergency Contact Information  Primary Emergency Contact: Nicolas Betancur  Address: Barbara Hernandez [address] Phone: [phone]  Mobile Phone: [phone]  Relation: Child   needed? No  Secondary Emergency Contact: Thee Betancur  Address: [address] [place] [address] Callie Leaver of [address] [place] [address] Phone: [phone]  Mobile Phone: [phone]  Relation: Child   needed? No    Past Surgical History:  Past Surgical History:   Procedure Laterality Date    ANOSCOPY  10/25/2006    injection of anal sphincter with Botox, Franklyn Ortiz/Timmy, [facility]    ANOSCOPY  2007    injection of anal sphincter with Botox, Dr. Piotr Strong, [address] [place] [address] ANOSCOPY  2007    injection of anal sphincter with Botox, Franklyn Guidry [facility]    ANUS SURGERY  2006    Lateral sphincterotomy for chronic anal fissure, Dr. Duke Melendez, [facility]  2012    anal exam and injection of Botox 100 units into anal sphincter, Laila Guidry, [facility]   Tracey Mackenzie 15    COLONOSCOPY  2004    cecal polyp, bx (no pathologic changes), Dr. Keon Valdez, [address] [place] [address] COLONOSCOPY  2006 R76.8    PFO (patent foramen ovale) Q21.1    Chronic diastolic heart failure ([facility]) I50.32    Physical deconditioning R53.81    Lymphedema of both lower extremities I89.0    Chronic anemia D64.9    Dyspnea on exertion R06.09    Chronic deep vein thrombosis (DVT) of distal vein of right lower extremity ([facility]) I82.5Z1    GI bleed K92.2    Palpitations R00.2    Occult blood in stools R19.5    Anxiety and depression F41.9, F32.9    Chronic anticoagulation Z79.01    COPD (chronic obstructive pulmonary disease) ([facility]) J44.9    Acute on chronic respiratory failure with hypoxia ([facility]) J96.21    Blood loss anemia D50.0    Sepsis ([facility]) A41.9    Cellulitis of left lower extremity L03. 80    Uncontrolled type 2 diabetes mellitus with hyperglycemia ([facility]) E11.65    Lymphedema I89.0    Septicemia ([facility]) A41.9    Acute diastolic congestive heart failure ([facility]) I50.31    Acute on chronic anemia D64.9    Morbid obesity ([facility]) E66.01    Acute hypoxemic respiratory failure ([facility]) J96.01    Acute on chronic diastolic heart failure ([facility]) I50.33    Cellulitis L03.90    Stress fracture of right fibula M84.363A    Atrial fibrillation, currently in sinus rhythm Z86.79    Gastroesophageal reflux disease without esophagitis K21.9    Ulcers of both lower legs ([facility]) L97.919, L97.929    Chronic respiratory failure with hypoxia, on home O2 therapy ([facility]) J96.11, Z99.81    Abscess and cellulitis of gluteal region L02.31, L03.317       Isolation/Infection:   Isolation          No Isolation        Patient Infection Status     Infection Onset Added Last Indicated Last Indicated By Review Planned Expiration Resolved Resolved By    None active    Resolved    COVID-19 Rule Out 05/29/20 05/29/20 05/29/20 COVID-19 (Ordered)   05/29/20 Rule-Out Test Resulted    ESBL (Extended Spectrum Beta Lactamase)  12/04/18 12/04/18 Kamar Yusuf RN   09/24/19 Kamar Yusuf RN    E.  Coli, wound-leg,11/30/18          Nurse Assessment:  Last Vital Signs: BP (!) 119/54   Pulse 87   Temp 97.9 °F (36.6 °C) (Temporal)   Resp 14   Ht 5' 3\" (1.6 m)   Wt 250 lb (113.4 kg)   LMP  (LMP Unknown)   SpO2 99%   BMI 44.29 kg/m²     Last documented pain score (0-10 scale): Pain Level: 4  Last Weight:   Wt Readings from Last 1 Encounters:   05/28/20 250 lb (113.4 kg)     Mental Status:  Alert and oriented x4    IV Access:  None    Nursing Mobility/ADLs:  Walking   standby assist with walker  Transfer  x1 assist  Bathing  setup and assist x1  Dressing  assist x1  Toileting  assist x1  Feeding  independent  Med Admin  takes pills whole  Med Delivery   Takes pills whole and takes by herself    Wound Care Documentation and Therapy:  Wound 12/24/19 Coccyx (Active)   Number of days: 160       Wound 12/24/19 Leg Right; Anterior;Posterior (Active)   Number of days: 160       Wound 12/24/19 Leg Left; Anterior;Posterior (Active)   Number of days: 160       Wound 05/29/20 Buttocks Inner;Right (Active)   Wound Image   5/29/2020 11:30 AM   Wound Other 6/2/2020  8:45 AM   Dressing Status Changed 6/2/2020  8:45 AM   Dressing Changed Changed/New 6/2/2020  8:45 AM   Dressing/Treatment Alginate;Dry dressing 6/2/2020  8:45 AM   Wound Cleansed Soap and water 6/2/2020  8:45 AM   Dressing Change Due 06/03/20 6/2/2020  8:45 AM   Wound Length (cm) 4 cm 5/29/2020 11:30 AM   Wound Width (cm) 2 cm 5/29/2020 11:30 AM   Wound Depth (cm) 3 cm 5/29/2020 11:30 AM   Wound Surface Area (cm^2) 8 cm^2 5/29/2020 11:30 AM   Change in Wound Size % (l*w) 0 5/29/2020 11:30 AM   Wound Volume (cm^3) 24 cm^3 5/29/2020 11:30 AM   Wound Assessment Red;Painful;Swelling 6/2/2020  8:45 AM   Drainage Amount Small 6/2/2020  8:45 AM   Drainage Description Serosanguinous 6/2/2020  8:45 AM   Odor None 6/2/2020  8:45 AM   Ely-wound Assessment Red;Painful 6/2/2020  8:45 AM   Number of days: 4       Wound 05/29/20 Leg Right; Lower (Active)   Wound Image   5/29/2020 11:30 AM   Dressing Status Clean;Dry; Intact 6/2/2020 8:45 AM   Dressing Changed Other (Comment) 2020  8:45 AM   Dressing/Treatment ABD 2020  8:45 AM   Wound Cleansed Soap and water 2020  8:45 AM   Dressing Change Due 20  8:45 AM   Wound Assessment Clean;Dry; Intact 2020  8:45 AM   Drainage Amount None 2020  8:45 AM   Odor None 2020  8:45 AM   Ely-wound Assessment Dry 2020  8:45 AM   Number of days: 4       Wound 20 Leg Right; Lower (Active)   Wound Image   2020 11:30 AM   Dressing Status Clean;Dry; Intact 2020  8:45 AM   Dressing Changed Other (Comment) 2020  8:45 AM   Dressing/Treatment ABD 2020  8:45 AM   Wound Cleansed Rinsed/Irrigated with saline 2020  1:17 AM   Dressing Change Due 20  8:45 AM   Ely-wound Assessment Other (Comment) 2020  8:45 AM   Number of days: 4        Elimination:  Continence:   · Bowel: {YES / A}  · Bladder: {YES / IS:48923}  Urinary Catheter: N/A   Colostomy/Ileostomy/Ileal Conduit: {YES / PJ:95297}       Date of Last BM: 20    Intake/Output Summary (Last 24 hours) at 2020 1148  Last data filed at 2020 0937  Gross per 24 hour   Intake 1460 ml   Output 2900 ml   Net -1440 ml     I/O last 3 completed shifts:   In: 2000 [P.O.:1900; IV Piggyback:100]  Out: 3100 [Urine:3100]    Safety Concerns:     Fall risk    Impairments/Disabilities:      None    Nutrition Therapy:  Current Nutrition Therapy:   Carb Controlled diet    Routes of Feeding: feeds herself  Liquids: thin  Daily Fluid Restriction: none  Last Modified Barium Swallow with Video (Video Swallowing Test): N/A    Treatments at the Time of Hospital Discharge:   Respiratory Treatments: ***  Oxygen Therapy:  Oxygen nasal cannula 3Liters wears at home  Ventilator:    N/A    Rehab Therapies: {THERAPEUTIC INTERVENTION:5091826072}  Weight Bearing Status/Restrictions: full weight bearing as tolerated  Other Medical Equipment (for information only, NOT a DME order):

## 2020-06-02 NOTE — PROGRESS NOTES
Occupational Therapy  OCCUPATIONAL THERAPY INITIAL EVALUATION      Date:2020  Patient Name: Shania Fields  MRN: [id number]  : 1955  Room: [address] [place] [address]      Evaluating OT: Robson Martinez OTR/L #[id number]    AM-PAC Daily Activity Raw Score: 15/24    Recommended Adaptive Equipment: AE for LB dressing/bathing; TBD     Diagnosis: Abscess and cellulitis of gluteal region [L02.31, L03.317]  Abscess and cellulitis of gluteal region [L02.31, T45.961]  Referring Provider: Nato Petty MD  Patient presented to ED for coccyx wound    Procedures: bedside incision and drainage of abscess at R gluteal cleft      Pertinent Medical History: anal fissure, anemia, anxiety/depression, arthritis, a-fib, CHF, COPD, DM, HLD , HTN     Precautions:  Falls, O2, purewick, BLE/coccyx wounds, TAPS     Home Living: Pt lives with  and 2 grandsons in 2 story house w/ ramped entrance; 1st floor set-up   Bathroom setup: sponge bathes (unable to get to second floor for full bath)   Equipment owned: [facility], rollator, manual w/c, home O2    Prior Level of Function: Assistance for LB dressing/bathing with ADLs; Able to complete UB self-care tasks mod. I , family completes all IADLs; ambulated w/ rollator in house for short distances and uses manual w/c in community.  Family propels pt in manual w/c  Driving: n/a    Pain Level: Pt c/o 3/10 pain in RLE, educated pt on pain management  Cognition: A&O: 4/4; Follows 1-2 step directions   Memory:  good    Sequencing:  fair    Problem solving: fair    Judgement/safety: fair      Functional Assessment:   Initial Eval Status  Date: 20 Treatment Status  Date: STGs/LTGs  Treatment frequency: 1-4x/wk   Feeding Independent      Grooming Minimal Assist (standing at sink)  Modified [place]    UB Dressing Minimal Assist   Modified [place]    LB Dressing Dependent   Maximal Assist (pt reports that her family assists with LB dressing at home)   Bathing Maximal Assist (simulated)  Minimal Assist w/ AE    Toileting Maximal Assist for personal hygiene and clothing management  Minimal Assist    Bed Mobility  Supine to sit: Minimal Assist   Sit to supine: NT (pt in bedside chair at end of session)  Supine to sit: Supervision   Sit to supine: Supervision    Functional Transfers Minimal Assist   Supervision   Functional Mobility Minimal Assist w/ ww (to/from bathroom)  Supervision w/ ww   Balance Sitting:     Static:  SBA    Dynamic:Min. A  Standing: w/ ww    Static: Min. A    Dynamic: Min. A     Activity Tolerance fair  good   Visual/  Perceptual Glasses: readers                  Hand dominance: R     Strength ROM Additional Info:    RUE  Grossly 4-/5  Grossly WFL good  and wfl FMC/dexterity noted during ADL tasks       LUE Grossly 4-/5  Grossly WFL good  and wfl FMC/dexterity noted during ADL tasks         Hearing: WFL   Sensation:  Pt c/o numbness/tingling in B feet (chronic)  Tone: WFL   Edema: BLE noted            Treatment: Upon arrival, patient lying in bed and agreeable to OT session at this time in collaboration with PT. RN approved. Therapist facilitated bed mobility(educated pt on technique to increase independence), functional transfers (EOB, sit<>stand; commode, sit<>stand w/ cueing for proper hand placement), standing tolerance tasks and functional mobility task with ww(cuing on posture, w/w management and safety). Therapist facilitated self-care retraining: UB/LB self-care tasks(donned socks), toileting task (in bathroom-assisted pt with hygiene and clothing management) and standing grooming task(washed hands at sink) while educating pt on modified techniques, posture, safety and energy conservation techniques. Skilled monitoring of HR, O2 sats and pts response to treatment. EOB pt reported feeling dizzy, educated pt on looking straight ahead and keeping head up along with PLB. Dizziness subsided within 1-2 mins. Pt on 4L O2. O2 maintained WNL throughout session.  At end of session, patient seated in bedside chair with call light and phone within reach, all lines and tubes intact. Other medical staff present. Comments:  Overall pt demonstrated decreased independence and safety during completion of ADL/functional transfers/mobility tasks. Pt demonstrating fair understanding of education/techniques, requiring additional training / education. Pt would benefit from continued skilled OT to increase functional independence and quality of life. Eval Complexity: Low    (Evaluation time includes thorough review of current medical information, gathering information on past medical history/social history and prior level of function, completion of standardized testing/informal observation of tasks, assessment of data, and development of POC/Goals.)      Assessment of current deficits   Functional mobility [x]  ADLs [x] Strength [x]  Cognition []  Functional transfers  [x] IADLs [x] Safety Awareness [x]  Endurance [x]  Fine Motor Coordination [] Balance [x] Vision/perception [] Sensation []   Gross Motor Coordination [] ROM [] Delirium []                  Motor Control []    Plan of Care (5-7 days):   ADL retraining [x]   Equipment needs [x]   Neuromuscular re-education [x] Energy Conservation Techniques [x]  Functional Transfer training [x] Patient and/or Family Education [x]  Functional Mobility training [x]  Environmental Modifications [x]  Cognitive re-training []   Compensatory techniques for ADLs [x]  Splinting Needs []   Positioning to improve overall function [x]   Therapeutic Activity [x]  Therapeutic Exercise  [x]  Visual/Perceptual: []    Delirium prevention/treatment  []   Other:  []    Rehab Potential: Good for established goals     Patient / Family Goal: Not stated     Patient and/or family were instructed diagnosis, prognosis/goals and plan of care. Demonstrated fair understanding.         Low Evaluation +   Treatment Time In:1:15            Treatment Time Out: 1:30 Treatment Charges: Mins Units   Ther Ex  73643     Manual Therapy 62298     Thera Activities 94782 10 1   ADL/Home Mgt 35230 5    Neuro Re-ed 14288     Group Therapy      Orthotic manage/training  19171     Non-Billable Time     Total Timed Treatment 15 117 [facility], OTR/L #[id number]

## 2020-06-02 NOTE — CARE COORDINATION
Received call back from [facility] with [facility] pt's sons are not willing to assist with dx changes or iv abx and per [employer] they told pt this. I re-visited pt to discuss, pt chose [facility], referral made to [facility] [address], [address] faxed to [phone], provided SS and . Awaiting acceptance, therapy order obtained, stat therapy initial evals called to [facility] at  [facility]. Destination, [facility], [facility], ambulance form, and envelope completed in soft-chart.

## 2020-06-02 NOTE — PROGRESS NOTES
[address] [place] [address] [facility]  SUZANNE  Progress Note      Chief Complaint   Patient presents with    Wound Check     wound on coccyx that has been increasingly painful. pt states she thinks it is infected. SUBJECTIVE:    Patient is seen and examined at bedside. She is awake and alert. Patient is tolerating medications. No reported adverse drug reactions. No problems overnight. Review of systems:    As stated above in the chief complaint, otherwise negative. Medications:    Scheduled Meds:   insulin regular human  45 Units Subcutaneous TID WC    lidocaine PF  5 mL Intradermal Once    heparin flush  3 mL Intravenous 2 times per day    miconazole   Topical BID    piperacillin-tazobactam  3.375 g Intravenous Q8H    And    sodium chloride  25 mL Intravenous Q8H    apixaban  5 mg Oral BID    bumetanide  1 mg Oral BID    dilTIAZem  240 mg Oral Daily    escitalopram  20 mg Oral Daily    ferrous sulfate  325 mg Oral Daily with breakfast    gabapentin  300 mg Oral TID    pantoprazole  40 mg Oral QAM AC    metoprolol succinate  100 mg Oral BID    montelukast  10 mg Oral Nightly    spironolactone  25 mg Oral Daily    vitamin D  50,000 Units Oral Weekly    vancomycin  1,250 mg Intravenous Q12H    omega-3 acid ethyl esters  2 g Oral BID     Continuous Infusions:   dextrose       PRN Meds:sodium chloride flush, heparin flush, glucose, dextrose, glucagon (rDNA), dextrose, oxyCODONE-acetaminophen, acetaminophen, albuterol, magic (miracle) mouthwash, traZODone, diphenhydrAMINE  Prior to Admission medications    Medication Sig Start Date End Date Taking?  Authorizing Provider   levoFLOXacin (LEVAQUIN) 750 MG tablet Take 1 tablet by mouth daily for 10 days 6/2/20 6/12/20 Yes Kiya Duncan, APRN - CNP   amoxicillin-clavulanate (AUGMENTIN ES-600) 600-42.9 MG/5ML suspension Take 15 mLs by mouth 2 times daily for 10 days 6/2/20 6/12/20 Yes Kiya Duncan, APRN - CNP   omega-3 acid ethyl esters (LOVAZA) 1 g capsule Take 2 capsules by mouth 2 times daily 6/1/20  Yes Herlinda Martin MD   insulin regular human (HUMULIN R U-500 KWIKPEN) 500 UNIT/ML SOPN concentrated injection pen Inject 45 Units into the skin 3 times daily (with meals) 45 units with meals. This is a highly concentrated insulin. After attaching the pen needle, prime with 5 units to ensure proper dosing. 6/1/20  Yes Herlinda Martin MD   insulin regular human (HUMULIN R U-500) 500 UNIT/ML concentrated injection vial Inject 45 Units into the skin 3 times daily (before meals) 6/1/20  Yes Herlinda Martin MD   Insulin Pen Needle ([employer] PEN NEEDLES 31G) 31G X 8 MM MISC 1 each by Does not apply route daily 6/1/20  Yes Herlinda Martin MD   Magic Mouthwash (MIRACLE MOUTHWASH) Swish and spit 5 mLs 4 times daily as needed for Irritation 5/28/20  Yes [address], DO   gabapentin (NEURONTIN) 300 MG capsule Take 1 capsule by mouth 3 times daily for 30 days. 5/28/20 6/27/20 Yes [address], DO   glucose monitoring kit (FREESTYLE) monitoring kit Use once. 5/26/20  Yes [address], DO   traZODone (DESYREL) 50 MG tablet TAKE 1 TABLET BY MOUTH EVERY DAY AT BEDTIME AS NEEDED FOR SLEEP 5/18/20  Yes Jennifer Alvarez, DO   bumetanide (BUMEX) 1 MG tablet TAKE 1 TABLET BY MOUTH TWICE A DAY 5/14/20  Yes Bryon Alvarez, DO   ipratropium-albuterol (DUONEB) 0.5-2.5 (3) MG/3ML SOLN nebulizer solution USE 1 VIAL VIA NEBULIZER EVERY 4 HOURS AS NEEDED FOR SHORTNESS OF BREATH 5/14/20  Yes Jennifer Alvarez, DO   dilTIAZem (CARDIZEM CD) 240 MG extended release capsule TAKE 1 CAPSULE BY MOUTH TWICE A DAY 5/5/20  Yes Pedro Alvarez, DO   Insulin Pen Needle 30G X 8 MM MISC 1 each by Does not apply route daily 3/3/20  Yes [address], DO   blood glucose monitor strips Test 4 times a day & as needed for symptoms of irregular blood glucose.  2/26/20  Yes Diana Lincoln, DO   vitamin D (ERGOCALCIFEROL) 1.25 MG (89616 UT) CAPS capsule TAKE 1 CAPSULE BY MOUTH TWICE WEEKLY 1/24/20  Yes Lauren Peng, DO   ferrous sulfate 325 (65 Fe) MG tablet TAKE 1 TABLET BY MOUTH EVERY DAY WITH BREAKFAST 1/24/20  Yes Mar Alvarez DO   montelukast (SINGULAIR) 10 MG tablet Take 1 tablet by mouth nightly 1/23/20  Yes Jennifer Alvarez DO   escitalopram (LEXAPRO) 20 MG tablet TAKE 1 TABLET BY MOUTH DAILY 1/23/20  Yes Mar Alvarez DO   apixaban (ELIQUIS) 5 MG TABS tablet TAKE 1 TABLET BY MOUTH TWICE A DAY 1/23/20  Yes Mar Alvarez DO   albuterol sulfate  (90 Base) MCG/ACT inhaler Inhale 2 puffs into the lungs 4 times daily as needed for Wheezing 1/23/20  Yes Lauren Peng, DO   blood glucose test strips (ASCENSIA AUTODISC VI;ONE TOUCH ULTRA TEST VI) strip Use 4 times daily 1/23/20  Yes Jennifer Alvarez DO   spironolactone (ALDACTONE) 25 MG tablet Take 1 tablet by mouth daily 12/19/19  Yes Mar Alvarez, DO   nystatin (MYCOSTATIN) 162594 UNIT/GM cream Apply topically 2 times daily. 12/7/19  Yes Anitra Santos, DO   lansoprazole (PREVACID) 30 MG delayed release capsule Take 1 capsule by mouth daily 11/27/19  Yes Mar Alvarez DO   Icosapent Ethyl (VASCEPA) 1 g CAPS capsule Take 2 capsules by mouth 2 times daily 11/22/19  Yes Rodrigo Alvarez, DO   ULTICARE ALCOHOL SWABS 70 % PADS USE 3 TIMES A DAY 10/14/19  Yes Historical Provider, MD   BD INSULIN SYRINGE U/F 31G X 5/16\" 1 ML MISC USE AS DIRECTED 10/18/19  Yes Historical Provider, MD   Insulin Pen Needle 31G X 8 MM MISC 1 each by Does not apply route daily 11/7/19  Yes Rodirgo Alvarez, DO   miconazole (MICOTIN) 2 % powder Apply topically 2 times daily.  9/24/19  Yes Niranjan Acevedo MD   metoprolol succinate (TOPROL XL) 200 MG extended release tablet Take 0.5 tablets by mouth 2 times daily 9/23/19  Yes Kaley Day, DO   acetaminophen (TYLENOL) 325 MG tablet Take 650 mg by mouth every 6 hours as needed for Pain   Yes Historical Provider, MD   glucose (GLUTOSE) 40 % GEL Take 37.5 mLs by mouth as needed (hypoglycemia) 18  Yes Juan Francisco Yoon APRN - CNP   OXYGEN Inhale 4 L into the lungs continuous    Yes Historical Provider, MD   Elastic Bandages & Supports MISC 20-30 mmHg bilateral compression stockings  Size to fit  Dx:  Edema  Knee high 17  Yes Nessa Medeiros MD       OBJECTIVE:  BP (!) 119/54   Pulse 87   Temp 97.9 °F (36.6 °C) (Temporal)   Resp 14   Ht 5' 3\" (1.6 m)   Wt 250 lb (113.4 kg)   LMP  (LMP Unknown)   SpO2 99%   BMI 44.29 kg/m²   Temp  Av.3 °F (36.3 °C)  Min: 96.6 °F (35.9 °C)  Max: 97.9 °F (36.6 °C)  Skin: Warm and dry. No rashes were noted. Right gluteal wound dressed. HEENT: Round and reactive pupils. Moist mucous membranes. No ulcerations or thrush. Neck: Supple to movements. Chest: No use of accessory muscles to breathe. Symmetrical expansion. No wheezing, crackles or rhonchi. Cardiovascular: Reguar rate and rhythm. No murmurs gallops, or rubs appreciated. Abdomen: Bowel sounds present, nontender, nondistended, no masses or hepatosplenomegaly. Extremities: Bilateral lower extremity dressed  Lines: peripheral    I/O last 3 completed shifts:   In: 2000 [P.O.:1900; IV Piggyback:100]  Out: 3100 [Urine:3100]      Laboratory and Tests Review:      Lab Results   Component Value Date    WBC 4.0 (L) 2020    WBC 5.2 2020    WBC 7.7 2020    HGB 9.2 (L) 2020    HCT 30.8 (L) 2020    MCV 83.0 2020     2020     Lab Results   Component Value Date    NEUTROABS 1.70 (L) 2020    NEUTROABS 2.71 2020    NEUTROABS 5.72 2020     No results found for: [facility]  Lab Results   Component Value Date    ALT 6 2020    AST 8 2020    ALKPHOS 109 (H) 2020    BILITOT <0.2 2020     Lab Results   Component Value Date     2020    K 4.3 2020    CL 92 2020    CO2 36 2020    BUN 16 2020    CREATININE 0.7 2020    CREATININE 0.7 06/01/2020    CREATININE 0.7 05/30/2020    GFRAA >60 06/02/2020    LABGLOM >60 06/02/2020    GLUCOSE 181 06/02/2020    GLUCOSE 181 12/16/2011    PROT 7.1 05/28/2020    LABALBU 3.4 05/28/2020    LABALBU 4.5 11/02/2011    CALCIUM 9.3 06/02/2020    BILITOT <0.2 05/28/2020    ALKPHOS 109 05/28/2020    AST 8 05/28/2020    ALT 6 05/28/2020     Lab Results   Component Value Date    CRP 14.0 (H) 05/29/2020    CRP 2.2 (H) 12/24/2019    CRP 2.0 (H) 09/22/2019     Lab Results   Component Value Date    SEDRATE 75 (H) 05/29/2020    SEDRATE 41 (H) 12/24/2019    SEDRATE 38 (H) 09/22/2019       Radiology:    CT ABDOMEN W WO CONTRAST Additional Contrast? Radiologist Recommendation   Final Result   1. CT scan washout curve evaluation of the right adrenal gland washout   demonstrate a undetermined characterization lesion. Due to the   increased size since the previous study of April 2019 this is a   suspicious lesion. 2. See above comments and recommendations. CT ABDOMEN PELVIS W IV CONTRAST Additional Contrast? None   Final Result   1. Findings for dermatocellulitis of the subtenon soft tissues of the   more posterior inferior medial aspect of the right gluteal region. The   no conspicuous extension into the very spaces. The no abscess   formation seen presently. No air in the soft tissues. 2. Splenomegaly with borderline size liver to be correlate clinically. 3. Further increased size of the right adrenal gland no measuring 3.4   x 3 cm. The final interpretation can require correlation with   histopathology. Please consider consultation with   endocrinology/surgery on routine bases. ALERT:  ABNORMAL REPORT. Lorean Snare US DUP LOWER EXTREMITIES BILATERAL VENOUS   Final Result      No evidence for deep vein thrombosis of the lower extremities to the   level of the knee. XR CHEST PORTABLE   Final Result      No evidence for acute cardiopulmonary process.                 Microbiology:   Lab Results Component Value Date    BC 24 Hours- no growth 05/28/2020    BC 5 Days- no growth 12/24/2019    BC 5 Days- no growth 09/22/2019    ORG Candida albicans 11/30/2018    ORG Pseudomonas aeruginosa 11/30/2018    ORG Escherichia coli 11/30/2018    ORG Strep agalactiae (Beta Strep Group B) 11/30/2018    ORG Gram negative yogesh 11/30/2018     Lab Results   Component Value Date    BLOODCULT2 24 Hours- no growth 05/28/2020    BLOODCULT2 5 Days- no growth 12/24/2019    BLOODCULT2 5 Days- no growth 09/22/2019    ORG Candida albicans 11/30/2018    ORG Pseudomonas aeruginosa 11/30/2018    ORG Escherichia coli 11/30/2018    ORG Strep agalactiae (Beta Strep Group B) 11/30/2018    ORG Gram negative yogesh 11/30/2018     WOUND/ABSCESS   Date Value Ref Range Status   09/22/2019   Final    Mixed orin isolated. Further workup and sensitivity testing  is not routinely indicated and will not be performed. Mixed orin isolated includes:  Proteus species  Oxidase positive Gram negative rods  Mixed Gram negative rods  Corynebacteria  Strep species     11/29/2018   Final    Mixed orin isolated. Further workup and sensitivity testing  is not routinely indicated and will not be performed. Mixed orin isolated includes:  Gram negative rods  Oxidase positive Gram negative rods  Beta hemolytic Strep species (Group B)     10/11/2018 Heavy growth  Final   10/11/2018 Heavy growth  Final   10/11/2018 Heavy growth  Final   10/11/2018 Heavy growth  Final     No results found for: RESPSMEAR      Component Value Date/Time    AFBCX No growth after 6 weeks of incubation. 11/30/2018 1458    FUNGSM No Fungal elements seen 11/30/2018 1458    LABFUNG Rare growth  No further workup   11/30/2018 1458     No results found for: CULTRESP  No results found for: CXCATHTIP  No results found for: [facility]  Culture Surgical   Date Value Ref Range Status   11/30/2018   Final    Light growth  Pandoraea species  No antibiotic susceptibility studies performed.   Plates will be held 10  days. Contact the laboratory, [phone], if send out is desired. 11/30/2018 Heavy growth  Final   11/30/2018   Final    Heavy growth  This organism possesses an Extended Spectrum Beta Lactamase  that is inhibited by Clavulanic Acid. 11/30/2018 Light growth  Final     Creatinine Ur POCT   Date Value Ref Range Status   05/16/2018 100  Final     Urine Culture, Routine   Date Value Ref Range Status   11/27/2018 Growth not present  Final   10/13/2018 Growth not present  Final   05/16/2018 Growth not present  Final     No results found for: [facility]    Problem list:    Active Problems:    Abscess and cellulitis of gluteal region  Resolved Problems:    * No resolved hospital problems. *      ASSESSMENT:    Right gluteal celluliits CT shows no abscess and no air   Chronic right buttock wound --- status post incision and drainage of abscess at right gluteal cleft 5/31/2020  History of anal fissure   Chronic lymphedema   Bilateral lower extremity chronic skin changes/lymphedema   Afebrile   Plan:   Cont vanco and zosyn while in-house- discharged on Augmentin ES 1800 mg twice daily plus Levaquin 750 mg daily for total of 10 days. Pharmacy to dose vancomycin   Check cultures   Baseline ESR-75, CRP-14 (05/30/2020)   Continue local wound care   Wound care help appreciated   Monitor labs   Status post incision and drainage of abscess right gluteal cleft 5/31/2020 by general surgery-no cultures were sent  General surgery has no further surgical plans at this time. There were no cultures done at the time of the I and D on 5/31/2020--- we will discharge patient on Augmentin ES and Levaquin for 10 days. Okay to discharge from ID point of view--- patient will be going to rehab facility for physical therapy.     Yudith VORAP    1:16 PM  6/2/2020

## 2020-06-02 NOTE — CARE COORDINATION
Verified discharge plan with pt at bedside, plan remains home however, [facility] does not do abx therapy, pt choiced for [facility]VI, referral made to [facility], await acceptance. Will need [facility] orders at discharge of iv abx, cpa, med compliance, dx changes, pt, and ot. Charge BERNABE Stanton notified of the aforementioned. The Plan for Transition of Care is related to the following treatment goals: medical stability    The Patient  was provided with a choice of provider and agrees   with the discharge plan. [x] Yes [] No    Freedom of choice list was provided with basic dialogue that supports the patient's individualized plan of care/goals, treatment preferences and shares the quality data associated with the providers.  [x] Yes [] No

## 2020-06-02 NOTE — PROGRESS NOTES
Called [facility] and gave report to [employer] [facility]. Transport here picking up patient. She had her clothes and her portable oxygen tank that went with her.

## 2020-06-02 NOTE — PROGRESS NOTES
Physical Therapy  Physical Therapy Initial Assessment     Name: Jose Manuel Walters  : 1955  MRN: [id number]    Referring Provider: Vern Atkins MD    Date of Service: 2020    Evaluating PT: Nayeli Yang, PT, DPT, [id number]    Room #: 2486/4495-E  Diagnosis: Abscess and cellulitis of gluteal region  PMHx/PSHx: Afib, asthma, HTN, disc disorder, OA, anal fissure, occipital neuralgia, anemia, CHF, COPD, hx of blood clots, anxiety and depression, DM  Procedure/Surgery: Bedside I&D of abscess of R gluteal cleft ()  Precautions: Fall risk, coccyx and B LE wounds, O2, TAPS, Pure Wick    SUBJECTIVE:    Pt lives with  and 2 grandsons in a 2 story house with ramped entry. Bed is on first floor and bath is on first floor. Pt ambulated with rollator walker Mod Independent for short distances within home prior to admission. Pt used standard WC in the community and required assistance for propulsion. OBJECTIVE:   Initial Evaluation  Date: 20 Treatment Date: Short Term/ Long Term   Goals   AM-PAC 6 Clicks      Was pt agreeable to Eval/treatment? Yes     Does pt have pain? 3/10 R LE pain     Bed Mobility  Rolling: NT  Supine to sit: Min A  Sit to supine: NT  Scooting: SBA to EOB  Rolling: Independent   Supine to sit: Independent   Sit to supine: Independent   Scooting: Independent    Transfers Sit to stand: Min A  Stand to sit: Min A  Stand pivot: Min A with [address]  Sit to stand: Independent   Stand to sit: Independent   Stand pivot: Mod Independent with rollator walker   Ambulation   20, 30 feet with [address] with Min A  >100 feet with rollator walker Mod Independent    Stair negotiation: ascended and descended NT  NA   ROM BUE: Refer to OT note  BLE: WFL     Strength BUE: Refer to OT note  BLE: WFL     Balance Sitting EOB: Supervision  Dynamic Standing: Min A with [address]  Sitting EOB: Independent   Dynamic Standing:  Mod Independent with rollator walker     Pt is A & O x: 4 to person, place, month/year, and situation. Sensation: Pt denies numbness and tingling of extremities. Edema: B LE swelling noted. Patient education  Pt educated on proper technique with [employer] when ambulating. Patient response to education:   Pt verbalized understanding Pt demonstrated skill Pt requires further education in this area   Yes Partial  Yes     ASSESSMENT:    Comments:   Pt was supine in bed upon room entry, agreeable to PT evaluation with OT collaboration. Pt remained on 4 L O2/min throughout session and O2 sat was 95-96% following all activity. Pt required light assistance for trunk mobility during supine to sit transfer. Pt complained of dizziness and had moderate LOB once sitting EOB. Symptoms improved with time and pt reported symptoms could have been due to quick movements. Pt ambulated short distance to bathroom with WW. Pt has flexed posture and used incorrect placement of WW. Pt ambulated to commode and was seated using L grab bar. Pt sat unsupported on commode for 5+ minutes. Pt had fair dynamic standing balance during self care. Pt ambulated back to chair and was seated. All questions and concerns were addressed. Pt was left seated in chair with all needs met at conclusion of session. RN notified of pt's performance. Treatment:  Patient practiced and was instructed in the following treatment:     Therapeutic activities: Pt completed all therapeutic activities noted above. Pt was cued to refrain from moving too quickly to reduce likelihood of dizziness. Pt was cued for hand placement during sit to stand transfer. Pt was cued for [employer] approximation and upright posture when ambulating. Pt performed dynamic sitting balance while sitting unsupported on commode. Pt performed dynamic standing balance at [employer] and sink when in bathroom. Pt's/family goals:   1. To return home. Patient and or family understand(s) diagnosis, prognosis, and plan of care. Yes.     PLAN:    PT care will be provided in accordance with the objectives noted above. Exercises and functional mobility practice will be used as well as appropriate assistive devices or modalities to obtain goals. Patient and family education will also be administered as needed. Frequency of treatments: 2-5x/week x 2-3 days. Time in: 1310  Time out: 1330    Total Treatment Time 15 minutes     Evaluation Time includes thorough review of current medical information, gathering information on past medical history/social history and prior level of function, completion of standardized testing/informal observation of tasks, assessment of data and education on plan of care and goals.     CPT codes:  [] Low Complexity PT evaluation 42733  [x] Moderate Complexity PT evaluation 11994  [] High Complexity PT evaluation 70212  [] PT Re-evaluation 91817  [] Gait training 68851 0 minutes  [] Manual therapy 66172 0 minutes  [x] Therapeutic activities 01017 15 minutes  [] Therapeutic exercises 95778 0 minutes  [] Neuromuscular reeducation 28416 0 minutes     Kenyon Mems, PT, DPT  [id number]

## 2020-06-03 LAB
BLOOD CULTURE, ROUTINE: NORMAL
CULTURE, BLOOD 2: NORMAL

## 2020-06-03 RX ORDER — DILTIAZEM HYDROCHLORIDE 240 MG/1
CAPSULE, COATED, EXTENDED RELEASE ORAL
Qty: 60 CAPSULE | Refills: 0 | Status: SHIPPED
Start: 2020-06-03 | End: 2020-07-02

## 2020-06-04 LAB
METANEPH/PLASMA INTERP: NORMAL
METANEPHRINE FREE PLASMA: <0.1 NMOL/L (ref 0–0.49)
NORMETANEPHRINE FREE PLASMA: 0.29 NMOL/L (ref 0–0.89)

## 2020-06-04 NOTE — DISCHARGE SUMMARY
Hounsfield units Portal venous phase: 45 Hounsfield units Late phase: 49 Hounsfield units. This indicates  negative value is a for the absolute washout and for the relative washout, which makes this lesion undetermined by CT characterization of washout curve pattern. This lesion has increased size as mentioned on the report of the CT abdomen and pelvis of May 29 performed earlier when comparison is made with previous study of April 2019. Increased size of the right adrenal gland lesion up to 3.4 x 3 cm, can be of can indicate a suspicious lesion. An alternative to an interventional procedure as final interpretation can require correlation with histopathology will be MRI with in and out of phase. Left adrenal gland appears unremarkable. There are normal size and enhancement for the liver and spleen a multiphase evaluation. The pancreas has atrophy but appears unremarkable no focal lesions are seen. There is normal multiphasic enhancement for the pancreas are. Gallbladder is somewhat distended, it appears unremarkable. There is no dilatation of the biliary tree and pancreatic ductal system. The kidneys have unremarkable appearance. There is normal excretion of the contrast by the kidneys. Parapelvic cysts are seen in both kidneys. Aorta and IVC appear unremarkable. There is no midline retroperitoneal adenopathy. No acute inflammatory changes seen in the omental mesenteric fat planes of the abdomen. This study does not cover the pelvis. There is no ascites. There is no indication for bowel obstruction. Lower lung bases demonstrate areas of subpleural increased density are discrete subpleural atelectasis impulse lower lobes. 1. CT scan washout curve evaluation of the right adrenal gland washout demonstrate a undetermined characterization lesion. Due to the increased size since the previous study of April 2019 this is a suspicious lesion. 2. See above comments and recommendations.     Ct Abdomen Pelvis W Iv Contrast no acute inflammatory changes in the omental mesenteric fat planes, free intraperitoneal air, ascites or indication for bowel obstruction. Abdominal aorta and IVC appear unremarkable. Could not conspicuously identify the appendix or separate the appendix from adjacent bowel segments but there is no pericecal pericolonic inflammatory process. Lower lung bases demonstrate no significant findings. Some discrete residual scar is seen in the lower lobes. Calcifications are seen in the coronary arteries . There is some prominent diameter for the central pulmonary arteries to be correlated clinically. The heart has upper normal size. Degenerative changes are seen in the lumbar spine particularly in L4-5, and L5-S1 disc space and in the corresponding facet joints. 1. Findings for dermatocellulitis of the subtenon soft tissues of the more posterior inferior medial aspect of the right gluteal region. The no conspicuous extension into the very spaces. The no abscess formation seen presently. No air in the soft tissues. 2. Splenomegaly with borderline size liver to be correlate clinically. 3. Further increased size of the right adrenal gland no measuring 3.4 x 3 cm. The final interpretation can require correlation with histopathology. Please consider consultation with endocrinology/surgery on routine bases. ALERT:  ABNORMAL REPORT. [facility] Low Chest Portable    Result Date: 5/28/2020  Clinical indications: Wound check. Dyspnea. TECHNIQUE: Single frontal projection of the chest (1 view). COMPARISON: None. FINDINGS: The heart is enlarged. Acromioclavicular arthropathy. The heart, lungs, mediastinum and regional skeleton are otherwise unremarkable. No evidence for acute cardiopulmonary process. Us Dup Lower Extremities Bilateral Venous    Result Date: 5/28/2020  Real-time and Doppler sonography of the deep venous structures of the lower extremities. Grayscale, color Doppler, and spectral Doppler analysis.  There is adequate and spontaneous blood flow, compressibility and augmentation within the bilateral common femoral, superficial femoral, and popliteal veins. Below the knee, there were no diagnostic images. No evidence for deep vein thrombosis of the lower extremities to the level of the knee. Discharge Medications:      Medication List      START taking these medications    amoxicillin-clavulanate 600-42.9 MG/5ML suspension  Commonly known as: Augmentin ES-600  Take 15 mLs by mouth 2 times daily for 10 days     * insulin regular human 500 UNIT/ML Sopn concentrated injection pen  Commonly known as:  humuLIN R U-500 KWIKPEN  Inject 45 Units into the skin 3 times daily (with meals) 45 units with meals. This is a highly concentrated insulin. After attaching the pen needle, prime with 5 units to ensure proper dosing. * insulin regular human 500 UNIT/ML concentrated injection vial  Commonly known as:  humuLIN R U-500  Inject 45 Units into the skin 3 times daily (before meals)     levoFLOXacin 750 MG tablet  Commonly known as:  Levaquin  Take 1 tablet by mouth daily for 10 days     omega-3 acid ethyl esters 1 g capsule  Commonly known as:  LOVAZA  Take 2 capsules by mouth 2 times daily         * This list has 2 medication(s) that are the same as other medications prescribed for you. Read the directions carefully, and ask your doctor or other care provider to review them with you. CHANGE how you take these medications    * Insulin Pen Needle 31G X 8 MM Misc  1 each by Does not apply route daily  What changed:  Another medication with the same name was added. Make sure you understand how and when to take each. * Insulin Pen Needle 30G X 8 MM Misc  1 each by Does not apply route daily  What changed:  Another medication with the same name was added. Make sure you understand how and when to take each.      * [employer] Pen Needles 31G 31G X 8 MM Misc  Generic drug:  Insulin Pen Needle  1 each by Does not apply route daily  What changed: You were already taking a medication with the same name, and this prescription was added. Make sure you understand how and when to take each. * This list has 3 medication(s) that are the same as other medications prescribed for you. Read the directions carefully, and ask your doctor or other care provider to review them with you. CONTINUE taking these medications    acetaminophen 325 MG tablet  Commonly known as:  TYLENOL     albuterol sulfate  (90 Base) MCG/ACT inhaler  Inhale 2 puffs into the lungs 4 times daily as needed for Wheezing     apixaban 5 MG Tabs tablet  Commonly known as:  Eliquis  TAKE 1 TABLET BY MOUTH TWICE A DAY     BD Insulin Syringe U/F 31G X 5/16\" 1 ML Misc  Generic drug:  Insulin Syringe-Needle U-100     * blood glucose test strips strip  Commonly known as:  ASCENSIA AUTODISC VI;ONE TOUCH ULTRA TEST VI  Use 4 times daily     * blood glucose test strips  Test 4 times a day & as needed for symptoms of irregular blood glucose. bumetanide 1 MG tablet  Commonly known as:  BUMEX  TAKE 1 TABLET BY MOUTH TWICE A DAY     Elastic Bandages & Supports Misc  20-30 mmHg bilateral compression stockings  Size to fit  Dx:  Edema  Knee high     escitalopram 20 MG tablet  Commonly known as:  LEXAPRO  TAKE 1 TABLET BY MOUTH DAILY     ferrous sulfate 325 (65 Fe) MG tablet  Commonly known as:  IRON 325  TAKE 1 TABLET BY MOUTH EVERY DAY WITH BREAKFAST     gabapentin 300 MG capsule  Commonly known as:  NEURONTIN  Take 1 capsule by mouth 3 times daily for 30 days. glucose 40 % Gel  Commonly known as:  GLUTOSE  Take 37.5 mLs by mouth as needed (hypoglycemia)     glucose monitoring kit monitoring kit  Use once.      Icosapent Ethyl 1 g Caps capsule  Commonly known as:  VASCEPA  Take 2 capsules by mouth 2 times daily     ipratropium-albuterol 0.5-2.5 (3) MG/3ML Soln nebulizer solution  Commonly known as:  DUONEB  USE 1 VIAL VIA NEBULIZER EVERY 4 HOURS AS NEEDED FOR SHORTNESS OF BREATH     lansoprazole 30 MG delayed release capsule  Commonly known as:  PREVACID  Take 1 capsule by mouth daily     Magic Mouthwash  Commonly known as:  Miracle Mouthwash  Swish and spit 5 mLs 4 times daily as needed for Irritation     metoprolol succinate 200 MG extended release tablet  Commonly known as: Toprol XL  Take 0.5 tablets by mouth 2 times daily     miconazole 2 % powder  Commonly known as:  MICOTIN  Apply topically 2 times daily. montelukast 10 MG tablet  Commonly known as:  SINGULAIR  Take 1 tablet by mouth nightly     nystatin 404095 UNIT/GM cream  Commonly known as:  MYCOSTATIN  Apply topically 2 times daily. OXYGEN     spironolactone 25 MG tablet  Commonly known as:  ALDACTONE  Take 1 tablet by mouth daily     traZODone 50 MG tablet  Commonly known as:  DESYREL  TAKE 1 TABLET BY MOUTH EVERY DAY AT BEDTIME AS NEEDED FOR SLEEP     UltiCare Alcohol Swabs 70 % Pads     vitamin D 1.25 MG (88780 UT) Caps capsule  Commonly known as:  ERGOCALCIFEROL  TAKE 1 CAPSULE BY MOUTH TWICE WEEKLY         * This list has 2 medication(s) that are the same as other medications prescribed for you. Read the directions carefully, and ask your doctor or other care provider to review them with you.             STOP taking these medications    clindamycin 300 MG capsule  Commonly known as:  CLEOCIN     dilTIAZem 240 MG extended release capsule  Commonly known as:  CARDIZEM CD     ibuprofen 800 MG tablet  Commonly known as:  ADVIL;MOTRIN     insulin glargine 100 UNIT/ML injection vial  Commonly known as:  Lantus     insulin lispro 100 UNIT/ML injection vial  Commonly known as:  HUMALOG           Where to Get Your Medications      These medications were sent to [address] [place] [address] [address], [address] [address]    Phone:  [phone]   · omega-3 acid ethyl esters 1 g capsule     You can get these medications from any pharmacy Bring a paper prescription for each of these medications  · amoxicillin-clavulanate 600-42.9 MG/5ML suspension  · insulin regular human 500 UNIT/ML concentrated injection vial  · insulin regular human 500 UNIT/ML Sopn concentrated injection pen  · [employer] Pen Needles 31G 31G X 8 MM Misc  · levoFLOXacin 750 MG tablet         Time Spent on discharge is more than 35 minutes in the examination, evaluation, counseling and review of medications and discharge plan.    +++++++++++++++++++++++++++++++++++++++++++++++++  [place], [place]  +++++++++++++++++++++++++++++++++++++++++++++++++  NOTE: This report was transcribed using voice recognition software. Every effort was made to ensure accuracy; however, inadvertent computerized transcription errors may be present.

## 2020-06-09 RX ORDER — INSULIN GLARGINE 100 [IU]/ML
40 INJECTION, SOLUTION SUBCUTANEOUS 2 TIMES DAILY
Qty: 10 VIAL | Refills: 3 | OUTPATIENT
Start: 2020-06-09

## 2020-06-10 RX ORDER — INSULIN GLARGINE 100 [IU]/ML
40 INJECTION, SOLUTION SUBCUTANEOUS 2 TIMES DAILY
Qty: 10 VIAL | Refills: 3 | OUTPATIENT
Start: 2020-06-10

## 2020-07-02 RX ORDER — DILTIAZEM HYDROCHLORIDE 240 MG/1
CAPSULE, COATED, EXTENDED RELEASE ORAL
Qty: 60 CAPSULE | Refills: 0 | Status: SHIPPED
Start: 2020-07-02 | End: 2020-08-27 | Stop reason: SDUPTHER

## 2020-07-06 ENCOUNTER — OFFICE VISIT (OUTPATIENT)
Dept: SURGERY | Age: 65
End: 2020-07-06
Payer: COMMERCIAL

## 2020-07-06 VITALS
SYSTOLIC BLOOD PRESSURE: 132 MMHG | OXYGEN SATURATION: 99 % | WEIGHT: 268 LBS | BODY MASS INDEX: 47.48 KG/M2 | DIASTOLIC BLOOD PRESSURE: 74 MMHG | HEIGHT: 63 IN | HEART RATE: 79 BPM | RESPIRATION RATE: 16 BRPM

## 2020-07-06 PROCEDURE — G8417 CALC BMI ABV UP PARAM F/U: HCPCS | Performed by: SURGERY

## 2020-07-06 PROCEDURE — G8428 CUR MEDS NOT DOCUMENT: HCPCS | Performed by: SURGERY

## 2020-07-06 PROCEDURE — 99211 OFF/OP EST MAY X REQ PHY/QHP: CPT | Performed by: SURGERY

## 2020-08-03 ENCOUNTER — TELEPHONE (OUTPATIENT)
Dept: FAMILY MEDICINE CLINIC | Age: 65
End: 2020-08-03

## 2020-08-03 NOTE — TELEPHONE ENCOUNTER
Patient requesting refill of lantus. However when she was hospitalized Dr. Shawn Loco took her off of lantus and put her on Humulin-U500 insulin 100 units TID with meals then decreased it to 45 units TID with meals. Please advise.

## 2020-08-04 RX ORDER — INSULIN GLARGINE 100 [IU]/ML
40 INJECTION, SOLUTION SUBCUTANEOUS 2 TIMES DAILY
Qty: 10 VIAL | Refills: 3 | OUTPATIENT
Start: 2020-08-04

## 2020-08-14 ENCOUNTER — APPOINTMENT (OUTPATIENT)
Dept: GENERAL RADIOLOGY | Age: 65
DRG: 194 | End: 2020-08-14
Payer: COMMERCIAL

## 2020-08-14 ENCOUNTER — HOSPITAL ENCOUNTER (INPATIENT)
Age: 65
LOS: 5 days | Discharge: HOME HEALTH | DRG: 194 | End: 2020-08-20
Attending: EMERGENCY MEDICINE | Admitting: EMERGENCY MEDICINE
Payer: COMMERCIAL

## 2020-08-14 LAB
ANION GAP SERPL CALCULATED.3IONS-SCNC: 12 MMOL/L (ref 7–16)
BASOPHILS ABSOLUTE: 0.04 E9/L (ref 0–0.2)
BASOPHILS RELATIVE PERCENT: 0.6 % (ref 0–2)
BILIRUBIN URINE: NEGATIVE
BLOOD, URINE: ABNORMAL
BUN BLDV-MCNC: 16 MG/DL (ref 8–23)
CALCIUM SERPL-MCNC: 9.7 MG/DL (ref 8.6–10.2)
CHLORIDE BLD-SCNC: 93 MMOL/L (ref 98–107)
CLARITY: CLEAR
CO2: 33 MMOL/L (ref 22–29)
COLOR: YELLOW
CREAT SERPL-MCNC: 0.7 MG/DL (ref 0.5–1)
EOSINOPHILS ABSOLUTE: 0.12 E9/L (ref 0.05–0.5)
EOSINOPHILS RELATIVE PERCENT: 1.7 % (ref 0–6)
GFR AFRICAN AMERICAN: >60
GFR NON-AFRICAN AMERICAN: >60 ML/MIN/1.73
GLUCOSE BLD-MCNC: 273 MG/DL (ref 74–99)
GLUCOSE URINE: >=1000 MG/DL
HCT VFR BLD CALC: 29.4 % (ref 34–48)
HEMOGLOBIN: 9 G/DL (ref 11.5–15.5)
IMMATURE GRANULOCYTES #: 0.05 E9/L
IMMATURE GRANULOCYTES %: 0.7 % (ref 0–5)
KETONES, URINE: NEGATIVE MG/DL
LACTIC ACID, SEPSIS: 2.8 MMOL/L (ref 0.5–1.9)
LEUKOCYTE ESTERASE, URINE: NEGATIVE
LYMPHOCYTES ABSOLUTE: 1.12 E9/L (ref 1.5–4)
LYMPHOCYTES RELATIVE PERCENT: 15.5 % (ref 20–42)
MCH RBC QN AUTO: 25.5 PG (ref 26–35)
MCHC RBC AUTO-ENTMCNC: 30.6 % (ref 32–34.5)
MCV RBC AUTO: 83.3 FL (ref 80–99.9)
MONOCYTES ABSOLUTE: 0.63 E9/L (ref 0.1–0.95)
MONOCYTES RELATIVE PERCENT: 8.7 % (ref 2–12)
NEUTROPHILS ABSOLUTE: 5.26 E9/L (ref 1.8–7.3)
NEUTROPHILS RELATIVE PERCENT: 72.8 % (ref 43–80)
NITRITE, URINE: NEGATIVE
PDW BLD-RTO: 15.7 FL (ref 11.5–15)
PH UA: 7 (ref 5–9)
PLATELET # BLD: 236 E9/L (ref 130–450)
PMV BLD AUTO: 9.6 FL (ref 7–12)
POTASSIUM REFLEX MAGNESIUM: 4.5 MMOL/L (ref 3.5–5)
PRO-BNP: 800 PG/ML (ref 0–125)
PROTEIN UA: ABNORMAL MG/DL
RBC # BLD: 3.53 E12/L (ref 3.5–5.5)
SODIUM BLD-SCNC: 138 MMOL/L (ref 132–146)
SPECIFIC GRAVITY UA: 1.02 (ref 1–1.03)
TROPONIN: <0.01 NG/ML (ref 0–0.03)
UROBILINOGEN, URINE: 0.2 E.U./DL
WBC # BLD: 7.2 E9/L (ref 4.5–11.5)

## 2020-08-14 PROCEDURE — 94640 AIRWAY INHALATION TREATMENT: CPT

## 2020-08-14 PROCEDURE — 83605 ASSAY OF LACTIC ACID: CPT

## 2020-08-14 PROCEDURE — 71045 X-RAY EXAM CHEST 1 VIEW: CPT

## 2020-08-14 PROCEDURE — 83880 ASSAY OF NATRIURETIC PEPTIDE: CPT

## 2020-08-14 PROCEDURE — 81001 URINALYSIS AUTO W/SCOPE: CPT

## 2020-08-14 PROCEDURE — 99284 EMERGENCY DEPT VISIT MOD MDM: CPT

## 2020-08-14 PROCEDURE — 93005 ELECTROCARDIOGRAM TRACING: CPT | Performed by: STUDENT IN AN ORGANIZED HEALTH CARE EDUCATION/TRAINING PROGRAM

## 2020-08-14 PROCEDURE — 85025 COMPLETE CBC W/AUTO DIFF WBC: CPT

## 2020-08-14 PROCEDURE — 6360000002 HC RX W HCPCS: Performed by: STUDENT IN AN ORGANIZED HEALTH CARE EDUCATION/TRAINING PROGRAM

## 2020-08-14 PROCEDURE — 80048 BASIC METABOLIC PNL TOTAL CA: CPT

## 2020-08-14 PROCEDURE — 84484 ASSAY OF TROPONIN QUANT: CPT

## 2020-08-14 PROCEDURE — 87040 BLOOD CULTURE FOR BACTERIA: CPT

## 2020-08-14 PROCEDURE — 99285 EMERGENCY DEPT VISIT HI MDM: CPT

## 2020-08-14 PROCEDURE — 36415 COLL VENOUS BLD VENIPUNCTURE: CPT

## 2020-08-14 RX ORDER — SODIUM CHLORIDE 0.9 % (FLUSH) 0.9 %
10 SYRINGE (ML) INJECTION EVERY 12 HOURS SCHEDULED
Status: DISCONTINUED | OUTPATIENT
Start: 2020-08-14 | End: 2020-08-15 | Stop reason: SDUPTHER

## 2020-08-14 RX ORDER — ALBUTEROL SULFATE 2.5 MG/3ML
7.5 SOLUTION RESPIRATORY (INHALATION) ONCE
Status: COMPLETED | OUTPATIENT
Start: 2020-08-14 | End: 2020-08-14

## 2020-08-14 RX ORDER — SODIUM CHLORIDE 0.9 % (FLUSH) 0.9 %
10 SYRINGE (ML) INJECTION PRN
Status: DISCONTINUED | OUTPATIENT
Start: 2020-08-14 | End: 2020-08-15 | Stop reason: SDUPTHER

## 2020-08-14 RX ADMIN — ALBUTEROL SULFATE 7.5 MG: 2.5 SOLUTION RESPIRATORY (INHALATION) at 21:10

## 2020-08-14 ASSESSMENT — ENCOUNTER SYMPTOMS
BACK PAIN: 0
DIARRHEA: 0
VOMITING: 0
COUGH: 0
NAUSEA: 0
RHINORRHEA: 0
SHORTNESS OF BREATH: 1
CONSTIPATION: 0
ABDOMINAL PAIN: 0

## 2020-08-15 PROBLEM — I50.43 CHF (CONGESTIVE HEART FAILURE), NYHA CLASS I, ACUTE ON CHRONIC, COMBINED (HCC): Status: ACTIVE | Noted: 2020-08-15

## 2020-08-15 LAB
AMORPHOUS: ABNORMAL
BACTERIA: ABNORMAL /HPF
EKG ATRIAL RATE: 113 BPM
EKG P AXIS: 55 DEGREES
EKG P-R INTERVAL: 154 MS
EKG Q-T INTERVAL: 356 MS
EKG QRS DURATION: 106 MS
EKG QTC CALCULATION (BAZETT): 488 MS
EKG R AXIS: 60 DEGREES
EKG T AXIS: 60 DEGREES
EKG VENTRICULAR RATE: 113 BPM
EPITHELIAL CELLS, UA: ABNORMAL /HPF
LACTIC ACID, SEPSIS: 1.4 MMOL/L (ref 0.5–1.9)
METER GLUCOSE: 295 MG/DL (ref 74–99)
METER GLUCOSE: 333 MG/DL (ref 74–99)
METER GLUCOSE: 480 MG/DL (ref 74–99)
METER GLUCOSE: 488 MG/DL (ref 74–99)
RBC UA: ABNORMAL /HPF (ref 0–2)
WBC UA: ABNORMAL /HPF (ref 0–5)

## 2020-08-15 PROCEDURE — 2700000000 HC OXYGEN THERAPY PER DAY

## 2020-08-15 PROCEDURE — 2500000003 HC RX 250 WO HCPCS: Performed by: INTERNAL MEDICINE

## 2020-08-15 PROCEDURE — 36415 COLL VENOUS BLD VENIPUNCTURE: CPT

## 2020-08-15 PROCEDURE — 6370000000 HC RX 637 (ALT 250 FOR IP): Performed by: EMERGENCY MEDICINE

## 2020-08-15 PROCEDURE — 6370000000 HC RX 637 (ALT 250 FOR IP): Performed by: INTERNAL MEDICINE

## 2020-08-15 PROCEDURE — 82962 GLUCOSE BLOOD TEST: CPT

## 2020-08-15 PROCEDURE — APPSS180 APP SPLIT SHARED TIME > 60 MINUTES: Performed by: NURSE PRACTITIONER

## 2020-08-15 PROCEDURE — 2580000003 HC RX 258: Performed by: EMERGENCY MEDICINE

## 2020-08-15 PROCEDURE — 93010 ELECTROCARDIOGRAM REPORT: CPT | Performed by: INTERNAL MEDICINE

## 2020-08-15 PROCEDURE — 83605 ASSAY OF LACTIC ACID: CPT

## 2020-08-15 PROCEDURE — 99222 1ST HOSP IP/OBS MODERATE 55: CPT | Performed by: INTERNAL MEDICINE

## 2020-08-15 PROCEDURE — 6360000002 HC RX W HCPCS: Performed by: EMERGENCY MEDICINE

## 2020-08-15 PROCEDURE — 2140000000 HC CCU INTERMEDIATE R&B

## 2020-08-15 PROCEDURE — 6360000002 HC RX W HCPCS: Performed by: INTERNAL MEDICINE

## 2020-08-15 RX ORDER — PANTOPRAZOLE SODIUM 20 MG/1
20 TABLET, DELAYED RELEASE ORAL
Status: DISCONTINUED | OUTPATIENT
Start: 2020-08-15 | End: 2020-08-20 | Stop reason: HOSPADM

## 2020-08-15 RX ORDER — FERROUS SULFATE 325(65) MG
325 TABLET ORAL
Status: DISCONTINUED | OUTPATIENT
Start: 2020-08-15 | End: 2020-08-20 | Stop reason: HOSPADM

## 2020-08-15 RX ORDER — POLYETHYLENE GLYCOL 3350 17 G/17G
17 POWDER, FOR SOLUTION ORAL DAILY PRN
Status: DISCONTINUED | OUTPATIENT
Start: 2020-08-15 | End: 2020-08-20 | Stop reason: HOSPADM

## 2020-08-15 RX ORDER — BUMETANIDE 0.25 MG/ML
1 INJECTION, SOLUTION INTRAMUSCULAR; INTRAVENOUS 2 TIMES DAILY
Status: DISCONTINUED | OUTPATIENT
Start: 2020-08-16 | End: 2020-08-15

## 2020-08-15 RX ORDER — BUMETANIDE 0.25 MG/ML
2 INJECTION, SOLUTION INTRAMUSCULAR; INTRAVENOUS 2 TIMES DAILY
Status: DISCONTINUED | OUTPATIENT
Start: 2020-08-16 | End: 2020-08-18

## 2020-08-15 RX ORDER — SODIUM CHLORIDE 0.9 % (FLUSH) 0.9 %
10 SYRINGE (ML) INJECTION EVERY 12 HOURS SCHEDULED
Status: DISCONTINUED | OUTPATIENT
Start: 2020-08-15 | End: 2020-08-20 | Stop reason: HOSPADM

## 2020-08-15 RX ORDER — METOPROLOL SUCCINATE 100 MG/1
100 TABLET, EXTENDED RELEASE ORAL DAILY
Status: DISCONTINUED | OUTPATIENT
Start: 2020-08-16 | End: 2020-08-20 | Stop reason: HOSPADM

## 2020-08-15 RX ORDER — DILTIAZEM HYDROCHLORIDE 120 MG/1
120 CAPSULE, COATED, EXTENDED RELEASE ORAL DAILY
Status: DISCONTINUED | OUTPATIENT
Start: 2020-08-16 | End: 2020-08-15

## 2020-08-15 RX ORDER — MONTELUKAST SODIUM 10 MG/1
10 TABLET ORAL NIGHTLY
Status: DISCONTINUED | OUTPATIENT
Start: 2020-08-15 | End: 2020-08-20 | Stop reason: HOSPADM

## 2020-08-15 RX ORDER — METOPROLOL SUCCINATE 50 MG/1
50 TABLET, EXTENDED RELEASE ORAL DAILY
Status: DISCONTINUED | OUTPATIENT
Start: 2020-08-16 | End: 2020-08-15

## 2020-08-15 RX ORDER — PROMETHAZINE HYDROCHLORIDE 25 MG/1
12.5 TABLET ORAL EVERY 6 HOURS PRN
Status: DISCONTINUED | OUTPATIENT
Start: 2020-08-15 | End: 2020-08-20 | Stop reason: HOSPADM

## 2020-08-15 RX ORDER — DIGOXIN 0.25 MG/ML
250 INJECTION INTRAMUSCULAR; INTRAVENOUS EVERY 6 HOURS
Status: COMPLETED | OUTPATIENT
Start: 2020-08-16 | End: 2020-08-16

## 2020-08-15 RX ORDER — SODIUM CHLORIDE 0.9 % (FLUSH) 0.9 %
10 SYRINGE (ML) INJECTION PRN
Status: DISCONTINUED | OUTPATIENT
Start: 2020-08-15 | End: 2020-08-20 | Stop reason: HOSPADM

## 2020-08-15 RX ORDER — ACETAMINOPHEN 650 MG/1
650 SUPPOSITORY RECTAL EVERY 6 HOURS PRN
Status: DISCONTINUED | OUTPATIENT
Start: 2020-08-15 | End: 2020-08-20 | Stop reason: HOSPADM

## 2020-08-15 RX ORDER — ONDANSETRON 2 MG/ML
4 INJECTION INTRAMUSCULAR; INTRAVENOUS EVERY 6 HOURS PRN
Status: DISCONTINUED | OUTPATIENT
Start: 2020-08-15 | End: 2020-08-20 | Stop reason: HOSPADM

## 2020-08-15 RX ORDER — SPIRONOLACTONE 25 MG/1
25 TABLET ORAL DAILY
Status: DISCONTINUED | OUTPATIENT
Start: 2020-08-15 | End: 2020-08-20 | Stop reason: HOSPADM

## 2020-08-15 RX ORDER — METOPROLOL SUCCINATE 100 MG/1
100 TABLET, EXTENDED RELEASE ORAL ONCE
Status: COMPLETED | OUTPATIENT
Start: 2020-08-15 | End: 2020-08-15

## 2020-08-15 RX ORDER — DEXTROSE MONOHYDRATE 50 MG/ML
100 INJECTION, SOLUTION INTRAVENOUS PRN
Status: DISCONTINUED | OUTPATIENT
Start: 2020-08-15 | End: 2020-08-20 | Stop reason: HOSPADM

## 2020-08-15 RX ORDER — GABAPENTIN 300 MG/1
300 CAPSULE ORAL 3 TIMES DAILY
Status: DISCONTINUED | OUTPATIENT
Start: 2020-08-15 | End: 2020-08-20 | Stop reason: HOSPADM

## 2020-08-15 RX ORDER — ACETAMINOPHEN 325 MG/1
650 TABLET ORAL EVERY 6 HOURS PRN
Status: DISCONTINUED | OUTPATIENT
Start: 2020-08-15 | End: 2020-08-20 | Stop reason: HOSPADM

## 2020-08-15 RX ORDER — DILTIAZEM HYDROCHLORIDE 5 MG/ML
25 INJECTION INTRAVENOUS ONCE
Status: COMPLETED | OUTPATIENT
Start: 2020-08-15 | End: 2020-08-15

## 2020-08-15 RX ORDER — NICOTINE POLACRILEX 4 MG
15 LOZENGE BUCCAL PRN
Status: DISCONTINUED | OUTPATIENT
Start: 2020-08-15 | End: 2020-08-20 | Stop reason: HOSPADM

## 2020-08-15 RX ORDER — DIGOXIN 0.25 MG/ML
500 INJECTION INTRAMUSCULAR; INTRAVENOUS ONCE
Status: COMPLETED | OUTPATIENT
Start: 2020-08-15 | End: 2020-08-15

## 2020-08-15 RX ORDER — DILTIAZEM HYDROCHLORIDE 240 MG/1
240 CAPSULE, COATED, EXTENDED RELEASE ORAL DAILY
Status: DISCONTINUED | OUTPATIENT
Start: 2020-08-16 | End: 2020-08-20 | Stop reason: HOSPADM

## 2020-08-15 RX ORDER — DOXYCYCLINE HYCLATE 100 MG/1
100 CAPSULE ORAL EVERY 12 HOURS SCHEDULED
Status: DISCONTINUED | OUTPATIENT
Start: 2020-08-15 | End: 2020-08-17

## 2020-08-15 RX ORDER — ESCITALOPRAM OXALATE 10 MG/1
20 TABLET ORAL DAILY
Status: DISCONTINUED | OUTPATIENT
Start: 2020-08-15 | End: 2020-08-20 | Stop reason: HOSPADM

## 2020-08-15 RX ORDER — IPRATROPIUM BROMIDE AND ALBUTEROL SULFATE 2.5; .5 MG/3ML; MG/3ML
1 SOLUTION RESPIRATORY (INHALATION) EVERY 4 HOURS PRN
Status: DISCONTINUED | OUTPATIENT
Start: 2020-08-15 | End: 2020-08-20 | Stop reason: HOSPADM

## 2020-08-15 RX ORDER — DEXTROSE MONOHYDRATE 25 G/50ML
12.5 INJECTION, SOLUTION INTRAVENOUS PRN
Status: DISCONTINUED | OUTPATIENT
Start: 2020-08-15 | End: 2020-08-20 | Stop reason: HOSPADM

## 2020-08-15 RX ORDER — GABAPENTIN 100 MG/1
100 CAPSULE ORAL 3 TIMES DAILY
Status: ON HOLD | COMMUNITY
End: 2020-08-20 | Stop reason: HOSPADM

## 2020-08-15 RX ADMIN — ACETAMINOPHEN 650 MG: 325 TABLET, FILM COATED ORAL at 05:23

## 2020-08-15 RX ADMIN — INSULIN HUMAN 45 UNITS: 500 INJECTION, SOLUTION SUBCUTANEOUS at 11:33

## 2020-08-15 RX ADMIN — ESCITALOPRAM 20 MG: 10 TABLET, FILM COATED ORAL at 08:17

## 2020-08-15 RX ADMIN — DOXYCYCLINE HYCLATE 100 MG: 100 CAPSULE ORAL at 08:16

## 2020-08-15 RX ADMIN — DILTIAZEM HYDROCHLORIDE 25 MG: 5 INJECTION INTRAVENOUS at 17:24

## 2020-08-15 RX ADMIN — DOXYCYCLINE HYCLATE 100 MG: 100 CAPSULE ORAL at 21:49

## 2020-08-15 RX ADMIN — APIXABAN 5 MG: 5 TABLET, FILM COATED ORAL at 21:49

## 2020-08-15 RX ADMIN — METOPROLOL SUCCINATE 100 MG: 100 TABLET, EXTENDED RELEASE ORAL at 19:22

## 2020-08-15 RX ADMIN — SPIRONOLACTONE 25 MG: 25 TABLET ORAL at 08:17

## 2020-08-15 RX ADMIN — INSULIN LISPRO 12 UNITS: 100 INJECTION, SOLUTION INTRAVENOUS; SUBCUTANEOUS at 11:33

## 2020-08-15 RX ADMIN — INSULIN HUMAN 45 UNITS: 500 INJECTION, SOLUTION SUBCUTANEOUS at 17:23

## 2020-08-15 RX ADMIN — DIGOXIN 500 MCG: 0.25 INJECTION INTRAMUSCULAR; INTRAVENOUS at 20:22

## 2020-08-15 RX ADMIN — INSULIN LISPRO 8 UNITS: 100 INJECTION, SOLUTION INTRAVENOUS; SUBCUTANEOUS at 17:23

## 2020-08-15 RX ADMIN — Medication 10 ML: at 20:22

## 2020-08-15 RX ADMIN — INSULIN LISPRO 12 UNITS: 100 INJECTION, SOLUTION INTRAVENOUS; SUBCUTANEOUS at 07:47

## 2020-08-15 RX ADMIN — PANTOPRAZOLE SODIUM 20 MG: 20 TABLET, DELAYED RELEASE ORAL at 06:37

## 2020-08-15 RX ADMIN — MONTELUKAST SODIUM 10 MG: 10 TABLET, FILM COATED ORAL at 21:49

## 2020-08-15 RX ADMIN — Medication 1 EACH: at 17:23

## 2020-08-15 RX ADMIN — Medication 10 ML: at 08:15

## 2020-08-15 RX ADMIN — CEFTRIAXONE 2 G: 2 INJECTION, POWDER, FOR SOLUTION INTRAMUSCULAR; INTRAVENOUS at 08:16

## 2020-08-15 RX ADMIN — INSULIN LISPRO 3 UNITS: 100 INJECTION, SOLUTION INTRAVENOUS; SUBCUTANEOUS at 21:50

## 2020-08-15 RX ADMIN — APIXABAN 5 MG: 5 TABLET, FILM COATED ORAL at 08:16

## 2020-08-15 RX ADMIN — Medication 1 EACH: at 11:33

## 2020-08-15 RX ADMIN — FERROUS SULFATE TAB 325 MG (65 MG ELEMENTAL FE) 325 MG: 325 (65 FE) TAB at 08:15

## 2020-08-15 RX ADMIN — DEXTROSE MONOHYDRATE 10 MG/HR: 50 INJECTION, SOLUTION INTRAVENOUS at 18:00

## 2020-08-15 RX ADMIN — GABAPENTIN 300 MG: 300 CAPSULE ORAL at 08:15

## 2020-08-15 RX ADMIN — GABAPENTIN 300 MG: 300 CAPSULE ORAL at 13:54

## 2020-08-15 RX ADMIN — GABAPENTIN 300 MG: 300 CAPSULE ORAL at 21:49

## 2020-08-15 RX ADMIN — APIXABAN 5 MG: 5 TABLET, FILM COATED ORAL at 03:05

## 2020-08-15 ASSESSMENT — PAIN SCALES - GENERAL
PAINLEVEL_OUTOF10: 0
PAINLEVEL_OUTOF10: 4
PAINLEVEL_OUTOF10: 0
PAINLEVEL_OUTOF10: 0
PAINLEVEL_OUTOF10: 7
PAINLEVEL_OUTOF10: 0
PAINLEVEL_OUTOF10: 0

## 2020-08-15 NOTE — CONSULTS
as per Dr. Rosanne Burgos below. ER medications: Albuterol. She was admitted to a telemetry monitored unit. She was started on Bumex 1 mg IV BID. Please note: past medical records were reviewed per electronic medical record (EMR) - see detailed reports under Past Medical/ Surgical History. Past Medical History:    1. HTN  2. HLD: on statin therapy  3. Chronic Anemia  4. PAF: Not on coumadin d/t Hx of GIB (2004) and chronic anemia. [id number] score at least 5: (DM, TIA, Female, HTN)  5. GERD  6. 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 7. HFpEF:               A. Admitted 01/15/18 with progressive edyspnea troponin normal dQRU=491 hest xray showed b/l effusion, BNP= 814 and respiratory acidosis on ABGx2. She was given DubNebs x3, Solumedrol and placed on BiPAP. CBC showed an anemic Hemoglobin = 8.2. IV diuresed ~2.5 L.             T. TTE (1/16/2018, Dr. Rachele Sandifer) [facility] concentric left ventricular hypertrophy. Ejection fraction is visually estimated at 55-65%. Stage II diastolic dysfunction. The left atrium is severely dilated. Elevated L atrial pressure. Mild mitral regurgitation. The aortic valve appears mildly sclerotic. Pulmonary hypertension is mild to moderate. No evidence of pericardial effusion. 9. Questionable TIA (5/2015) with questionable PFO:              A. TTE with bubble study: 6/2/2015:  Mild asymmetric septal hypertrophy, Normal left ventricle size and systolic function, Stage II diastolic dysfunction, Patent foramen ovale with right-to-left shunt with valsalva only was visualized, Trace mitral regurgitation, RVSP is 37 mmHg.              B.  Per Dr. Rosanne Burgos: (5/22/2015-office visit note)-\"I personally viewed the echo images myself now.  The images were poor & the atrial septum was not well seen. Stephenie Carota is not a definite ASD.  A bubble study was performed & was poor quality but the few bubbles that were there did not cross the atrial septum.  The septum is probably aneurysmal spit 5 mLs 4 times daily as needed for Irritation 5/28/20  Yes Kike Ibrahim, DO   glucose monitoring kit (FREESTYLE) monitoring kit Use once. 5/26/20  Yes Kike Ibrahim, DO   traZODone (DESYREL) 50 MG tablet TAKE 1 TABLET BY MOUTH EVERY DAY AT BEDTIME AS NEEDED FOR SLEEP 5/18/20  Yes Jennifer Alvarez,    bumetanide (BUMEX) 1 MG tablet TAKE 1 TABLET BY MOUTH TWICE A DAY 5/14/20  Yes Kike Ibrahim, DO   ipratropium-albuterol (DUONEB) 0.5-2.5 (3) MG/3ML SOLN nebulizer solution USE 1 VIAL VIA NEBULIZER EVERY 4 HOURS AS NEEDED FOR SHORTNESS OF BREATH 5/14/20  Yes Kike Ibrahim, DO   blood glucose monitor strips Test 4 times a day & as needed for symptoms of irregular blood glucose. 2/26/20  Yes Karie Evans, DO   ferrous sulfate 325 (65 Fe) MG tablet TAKE 1 TABLET BY MOUTH EVERY DAY WITH BREAKFAST 1/24/20  Yes Red Alvarez, DO   montelukast (SINGULAIR) 10 MG tablet Take 1 tablet by mouth nightly 1/23/20  Yes Jennifer Alvarez, DO   escitalopram (LEXAPRO) 20 MG tablet TAKE 1 TABLET BY MOUTH DAILY 1/23/20  Yes Red Alvarez, DO   albuterol sulfate  (90 Base) MCG/ACT inhaler Inhale 2 puffs into the lungs 4 times daily as needed for Wheezing 1/23/20  Yes Red Alvarez, DO   blood glucose test strips (ASCENSIA AUTODISC VI;ONE TOUCH ULTRA TEST VI) strip Use 4 times daily 1/23/20  Yes Jennifer Alvarez, DO   lansoprazole (PREVACID) 30 MG delayed release capsule Take 1 capsule by mouth daily 11/27/19  Yes Jennifer Alvarez, DO   BD INSULIN SYRINGE U/F 31G X 5/16\" 1 ML MISC USE AS DIRECTED 10/18/19  Yes Historical Provider, MD   miconazole (MICOTIN) 2 % powder Apply topically 2 times daily.  9/24/19  Yes Tobin Nuñez MD   metoprolol succinate (TOPROL XL) 200 MG extended release tablet Take 0.5 tablets by mouth 2 times daily 9/23/19  Yes Eve Bold, DO   acetaminophen (TYLENOL) 325 MG tablet Take 650 mg by mouth every 6 hours as needed for Pain   Yes Historical Provider, MD OXYGEN Inhale 4 L into the lungs continuous    Yes Historical Provider, MD   Elastic Bandages & Supports MISC 20-30 mmHg bilateral compression stockings  Size to fit  Dx:  Edema  Knee high 12/14/17  Yes Cynthia Machuca MD   Insulin Pen Needle ([employer] PEN NEEDLES 31G) 31G X 8 MM MISC 1 each by Does not apply route daily 6/1/20   Albin Brandt MD   gabapentin (NEURONTIN) 300 MG capsule Take 1 capsule by mouth 3 times daily for 30 days. 5/28/20 6/27/20  Charisse Nissen Mellott, DO   Insulin Pen Needle 30G X 8 MM MISC 1 each by Does not apply route daily 3/3/20   Charisse Nissen Mellott, DO   vitamin D (ERGOCALCIFEROL) 1.25 MG (84077 UT) CAPS capsule TAKE 1 CAPSULE BY MOUTH TWICE WEEKLY 1/24/20   Charisse Nissen Mellott, DO   spironolactone (ALDACTONE) 25 MG tablet Take 1 tablet by mouth daily 12/19/19   Charisse Nissen Mellott, DO   nystatin (MYCOSTATIN) 570263 UNIT/GM cream Apply topically 2 times daily.  12/7/19   Ever Mcduffie, DO   Icosapent Ethyl (VASCEPA) 1 g CAPS capsule Take 2 capsules by mouth 2 times daily 11/22/19   Charisse Nissen Mellott, DO   ULTICARE ALCOHOL SWABS 70 % PADS USE 3 TIMES A DAY 10/14/19   Historical Provider, MD   glucose (GLUTOSE) 40 % GEL Take 37.5 mLs by mouth as needed (hypoglycemia) 12/20/18   Gaylene Mortimer, APRN - CNP       Current Medications:    Current Facility-Administered Medications: sodium chloride flush 0.9 % injection 10 mL, 10 mL, Intravenous, 2 times per day  sodium chloride flush 0.9 % injection 10 mL, 10 mL, Intravenous, PRN  acetaminophen (TYLENOL) tablet 650 mg, 650 mg, Oral, Q6H PRN **OR** acetaminophen (TYLENOL) suppository 650 mg, 650 mg, Rectal, Q6H PRN  polyethylene glycol (GLYCOLAX) packet 17 g, 17 g, Oral, Daily PRN  promethazine (PHENERGAN) tablet 12.5 mg, 12.5 mg, Oral, Q6H PRN **OR** ondansetron (ZOFRAN) injection 4 mg, 4 mg, Intravenous, Q6H PRN  spironolactone (ALDACTONE) tablet 25 mg, 25 mg, Oral, Daily  apixaban (ELIQUIS) tablet 5 mg, 5 mg, Oral, BID  escitalopram (LEXAPRO) tablet 20 mg, 20 mg, Oral, Daily  ferrous sulfate (IRON 325) tablet 325 mg, 325 mg, Oral, Daily with breakfast  gabapentin (NEURONTIN) capsule 300 mg, 300 mg, Oral, TID  insulin regular human (humuLIN R U-500 KWIKPEN) 500 UNIT/ML concentrated injection pen 45 Units, 45 Units, Subcutaneous, TID AC  ipratropium-albuterol (DUONEB) nebulizer solution 1 ampule, 1 ampule, Inhalation, Q4H PRN  montelukast (SINGULAIR) tablet 10 mg, 10 mg, Oral, Nightly  pantoprazole (PROTONIX) tablet 20 mg, 20 mg, Oral, QAM AC  insulin lispro (HUMALOG) injection vial 0-12 Units, 0-12 Units, Subcutaneous, TID WC  insulin lispro (HUMALOG) injection vial 0-6 Units, 0-6 Units, Subcutaneous, Nightly  [START ON 8/16/2020] bumetanide (BUMEX) injection 1 mg, 1 mg, Intravenous, BID  [START ON 8/16/2020] dilTIAZem (CARDIZEM CD) extended release capsule 120 mg, 120 mg, Oral, Daily  [START ON 8/16/2020] metoprolol succinate (TOPROL XL) extended release tablet 50 mg, 50 mg, Oral, Daily  doxycycline hyclate (VIBRAMYCIN) capsule 100 mg, 100 mg, Oral, 2 times per day  cefTRIAXone (ROCEPHIN) 2 g in sterile water 20 mL IV syringe, 2 g, Intravenous, Q24H  glucose (GLUTOSE) 40 % oral gel 15 g, 15 g, Oral, PRN  dextrose 50 % IV solution, 12.5 g, Intravenous, PRN  glucagon (rDNA) injection 1 mg, 1 mg, Intramuscular, PRN  dextrose 5 % solution, 100 mL/hr, Intravenous, PRN  Insulin Pen Needle MISC 1 each, 1 each, Does not apply, TID AC    Allergies:  Statins \"muscle pains\"    Social History: Former Smoker: quit in 2004: 1.5 PPD x 25 years. Denies alcohol and illicit drug use. Lives with her grandson and . Unable to ambulate up/down steps. Uses a walker for ambulation. Only able to ambulate 30 ft due to chronic MADRID. Unable to do household chores or walk around grocery story due to chronic MADRID. Drinks 1-3 cups of coffee per day. Family History: Patient is unclear of her family medical history. Please see below.    Family History Problem Relation Age of Onset    Heart Disease Mother     Diabetes Father     Colon Cancer Father     Other Father         BLINDNESS    Colon Cancer Sister     Diabetes Paternal Aunt     Diabetes Paternal Uncle     Diabetes Paternal Aunt     Diabetes Paternal Uncle        REVIEW OF SYSTEMS:     · Constitutional: + Fatigue. Denies fevers, chills or night sweats  · Eyes: Denies visual changes or drainage  · ENT: Denies headaches or hearing loss. No mouth sores or sore throat. No epistaxis   · Cardiovascular: Denies chest pain, pressure or palpitations. + lower extremity swelling /chronic lymphedema. · Respiratory: + MADRID, denies cough, + orthopnea (sleeps in a recliner chair). No hemoptysis   · Gastrointestinal: Denies hematemesis or anorexia. No hematochezia or melena    · Genitourinary: Denies urgency, dysuria or hematuria. · Musculoskeletal: Denies gait disturbance --uses a walker, + generalized weakness. Denies joint complaints  · Integumentary: Denies rash, hives or pruritis   · Neurological: Denies dizziness, headaches or seizures. No numbness or tingling  · Psychiatric: Denies anxiety or depression. · Endocrine: Denies temperature intolerance. No recent weight change. .  · Hematologic/Lymphatic: Denies abnormal bruising or bleeding. No swollen lymph nodes    PHYSICAL EXAM:   BP (!) 151/71   Pulse 105   Temp 97.3 °F (36.3 °C) (Temporal)   Resp 20   Ht 5' 3\" (1.6 m)   Wt (!) 307 lb 12.8 oz (139.6 kg)   LMP  (LMP Unknown)   SpO2 98%   BMI 54.52 kg/m²   CONST:  Well developed, well nourished middle-aged obese female who appears of stated age. Awake, alert and cooperative. No apparent distress. HEENT:   Head- Normocephalic, atraumatic   Eyes- Conjunctivae pink, anicteric  Throat- Oral mucosa pink and moist  Neck-  No stridor, trachea midline, + jugular venous distention. No carotid bruit. CHEST: Chest symmetrical and non-tender to palpation.  No accessory muscle use or intercostal retractions  RESPIRATORY: Lung sounds -bibasilar Rales. On supplemental O2 (5 L nasal cannula)  CARDIOVASCULAR:     Heart Inspection- shows no noted pulsations  Heart Palpation- no heaves or thrills; PMI is non-displaced   Heart Ausculation- Regular rate and rhythm, no murmur. No s3, s4 or rub   PV: + Bilateral lymphedema dressed with Ace wraps. Mild swelling in bilateral thighs. Lower extremity edema. Feet appear well perfused no clubbing or cyanosis. ABDOMEN: Soft, obese, non-tender to light palpation. Bowel sounds present. No palpable masses no organomegaly; no abdominal bruit  MS: Good muscle strength and tone. No atrophy or abnormal movements. : Deferred  SKIN: Warm and dry no statis dermatitis or ulcers   NEURO / PSYCH: Oriented to person, place and time. Speech clear and appropriate. Follows all commands. Pleasant affect     DATA:    ECG: As per Dr. Niraj James below  Tele strips: Sinus tachycardia (heart rate 100-115)  Diagnostic:      Intake/Output Summary (Last 24 hours) at 8/15/2020 1239  Last data filed at 8/15/2020 0650  Gross per 24 hour   Intake 240 ml   Output 900 ml   Net -660 ml       Labs:   CBC:   Recent Labs     08/14/20 2046   WBC 7.2   HGB 9.0*   HCT 29.4*        BMP:   Recent Labs     08/14/20 2046      K 4.5   CO2 33*   BUN 16   CREATININE 0.7   LABGLOM >60   CALCIUM 9.7     HgA1c:   Lab Results   Component Value Date    LABA1C 17.1 (H) 05/26/2020     proBNP:   Recent Labs     08/14/20 2046   PROBNP 800*     CARDIAC ENZYMES:  Recent Labs     08/14/20 2046   TROPONINI <0.01     FASTING LIPID PANEL:  Lab Results   Component Value Date    CHOL 287 05/26/2020    HDL 33 05/26/2020    [address] - 05/26/2020    TRIG 512 05/26/2020       CXR: 8/14/2020  Bibasilar airspace disease. Findings likely represent mild pulmonary    edema but infection and/or aspiration cannot be excluded in the right    clinical setting. ECHO: pending. Assessment/Plan as per Dr. Niraj James to follow. Electronically signed by RAUL Perez CNP on 8/15/20 at 12:46 PM EDT     I have personally seen and evaluated the patient. I personally obtained the history and performed the physical exam.  I personally reviewed all of the above labs, history, review of systems, and data. All of the assessments and recommendations are from me. All of the above cardiac medical decisions are from me. Please see my additional contributions to the history, physical exam, assessment, and recommendations below. History of chief complaint:  She was in the hospital in June for an incision and drainage of a chronic coccyx wound. She has been on antibiotics a rehab facility and has been discharged home from there 2 weeks ago. Since then she has had any creasing lower extremity edema, orthopnea, abdominal bloating, and increased oxygen requirements from her baseline. She denies any chest discomfort. Review of systems:     Heart: as above   Lungs: as above   Eyes: denies changes in vision or discharge. Ears: denies changes in hearing or pain. Nose: denies epistaxis or masses   Throat: denies sore throat or trouble swallowing. Neuro: denies numbness, tingling, tremors. Skin: denies rashes or itching. : denies hematuria, dysuria   GI: denies vomiting, diarrhea   Psych: denies mood changed, anxiety, depression. Physical exam:  BP (!) 152/78   Pulse 106   Temp 97.9 °F (36.6 °C) (Temporal)   Resp 20   Ht 5' 3\" (1.6 m)   Wt (!) 307 lb 12.8 oz (139.6 kg)   LMP  (LMP Unknown)   SpO2 98%   BMI 54.52 kg/m²   Constitutional: A&O x3, communicates well, no acute distress. Eyes: extraocular muscles intact, PERRL. Normal lids & conjunctiva. No icterus. ENT: clear, no bleeding. No external masses. Lips normal formation. Neck: supple, full ROM, no JVD, no bruits, no lymphadenopathy. No masses. trachea midline. Heart: Distant.   Regular rate & rhythm, normal S1 & S2, I/VI (normal physiologic) systolic murmur, S4 gallop. No heave. Lungs: Extremely limited air movement. No accessory muscles. Abd: soft, non-tender. Normal bowel sounds. Super morbidly obese. Neuro: Full ROM X 4, EOMI, no tremors. EXT: Wrapped but severe bilateral lower extremity edema  Skin: warm, dry, intact. Good turgor. Psych: A&O x 3, normal behavior, not anxious. Patient seen and examined. Chart, labs & data reviewed. A:  1. Hypervolemia. 2. Known stage II diastolic dysfunction. 3. Paroxysmal atrial fibrillation. 4. Hypertension  5. LVH  6. Severe oxygen requiring COPD. 7. Chronic lymphedema. 8. PFO. 9. Super morbid obesity. 10. Pulmonary hypertension  11. Anemia  12. Severe/uncontrolled diabetes. Rec:  1. IV diuresis. 2. Follow lites and creatinine. 3. Her home Toprol and Cardizem doses have been decreased. I will resume them back up to her prior doses.     Electronically signed by Azalea Owens DO on 8/15/2020 at 4:10 PM

## 2020-08-15 NOTE — ED PROVIDER NOTES
Patient is a 66-year-old female with history of COPD on 4 L nasal cannula continuous, CHF, blood clots on Eliquis, diabetes who presents to the emergency department with increasing shortness of breath above baseline since last night. Patient denies fever, chills, cough. Patient has been using her nebulizer at home every 2 hours through the night with moderate relief however returns to increased dyspnea. Patient denies any chest pain. Patient states she has been adherent with her medications, no changes. Patient denies any recent illness or sick contacts. Patient states she was discharged from 5 weeks of inpatient rehab 2 weeks ago. Patient unknown COVID risk. Patient denies any headache, blurred vision, double vision, abdominal pain, dysuria, GI symptoms. Review of Systems   Constitutional: Negative for chills and fever. HENT: Negative for congestion and rhinorrhea. Eyes: Negative for visual disturbance. Respiratory: Positive for shortness of breath. Negative for cough. Cardiovascular: Positive for leg swelling. Negative for chest pain and palpitations. Gastrointestinal: Negative for abdominal pain, constipation, diarrhea, nausea and vomiting. Genitourinary: Negative for dysuria, frequency and urgency. Musculoskeletal: Negative for arthralgias, back pain, myalgias and neck pain. Neurological: Negative for dizziness, syncope, weakness, light-headedness and numbness. Physical Exam  Vitals signs and nursing note reviewed. Constitutional:       General: She is awake. She is in acute distress. Appearance: She is obese. She is not ill-appearing or toxic-appearing. HENT:      Head: Normocephalic and atraumatic. Nose: Nose normal.      Mouth/Throat:      Mouth: Mucous membranes are moist.      Pharynx: Oropharynx is clear. Eyes:      Extraocular Movements: Extraocular movements intact. Pupils: Pupils are equal, round, and reactive to light.    Neck: Musculoskeletal: Normal range of motion and neck supple. Cardiovascular:      Rate and Rhythm: Regular rhythm. Tachycardia present. Pulses: Normal pulses. Heart sounds: Normal heart sounds. Pulmonary:      Breath sounds: Wheezing and rhonchi present. No rales. Abdominal:      General: Bowel sounds are normal.      Palpations: Abdomen is soft. Musculoskeletal: Normal range of motion. Right lower leg: 3+ Pitting Edema present. Left lower leg: 3+ Pitting Edema present. Skin:     General: Skin is warm and dry. Capillary Refill: Capillary refill takes less than 2 seconds. Neurological:      Mental Status: She is alert and oriented to person, place, and time. Psychiatric:         Behavior: Behavior is cooperative. MDM  Number of Diagnoses or Management Options  Acute on chronic heart failure, unspecified heart failure type [facility]):   Acute respiratory failure with hypoxia [facility]):   Diagnosis management comments: Patient is a 76-year-old female who presents to the emergency department with shortness of breath. Patient denies any chest pain. Does have a history of COPD and congestive heart failure. Patient presents awake, alert, following all commands, speaking in 2-3 word sentences, with mild dyspnea. Patient states she has been compliant with her medications. Patient is on supplemental oxygen which she has been wearing. Patient states she has been eating a very salty diet recently. Patient presents hemodynamically stable and neurologically intact. Lung sounds show no wheezing, however diminished and mild rales on auscultation. Secondary survey is otherwise unremarkable except for gross lymphedema to her lower legs which is chronic. These are wrapped in Ace bandages. Patient was given albuterol and monitored in the emergency department. Chest x-ray shows pulmonary edema.   Labs show mildly elevated BNP of 800, mild lactic acidosis, no leukocytosis, troponin negative, UA negative. EKG showing sinus tachycardia with no acute changes. Patient improved in the emergency department, speaking in full sentences. Discussion with patient regarding disposition. Patient was ambulated in the emergency department to use the washroom, and noted pulse ox to drop to the high 80s with tachycardia in the 140s with noted tachypnea. Decision was made to admit the patient and plan was discussed with her, she is agreeable. Hospitalist was consulted and patient was accepted to the medicine floor. All questions were answered at the bedside. Patient was transferred to the floor in stable condition.     --------------------------------------------- PAST HISTORY ---------------------------------------------  Past Medical History:  has a past medical history of Anal fissure, Anemia, Anxiety and depression, Arthritis, Asthma, Atrial fibrillation ([address] Blood transfusion, CHF (congestive heart failure) ([address] COPD (chronic obstructive pulmonary disease) ([address] Disc disorder, DM2 (diabetes mellitus, type 2) ([address] Fall due to stumbling, GERD (gastroesophageal reflux disease), Hx of blood clots, Hyperlipidemia, Hypertension, Inappropriate sinus tachycardia, LVH (left ventricular hypertrophy), Lymphadenitis, chronic, Occipital neuralgia, Respiratory acidosis, and Sinus tachycardia. Past Surgical History:  has a past surgical history that includes Tonsillectomy (); Appendectomy (1965);  section ();  section ();  section (); Anus surgery (2006); Upper gastrointestinal endoscopy (2004); Colonoscopy (2004); Colonoscopy (2006); anoscopy (10/25/2006); anoscopy (2007); anoscopy (2007); Colonoscopy (3/23/2012); Anus surgery (2012); joint replacement (); Upper gastrointestinal endoscopy (N/A, 2018);  Upper gastrointestinal endoscopy (N/A, 2018); and pr debridement, skin, sub-q tissue,muscle,=<20 sq cm (Left, 11/30/2018). Social History:  reports that she quit smoking about 15 years ago. Her smoking use included cigarettes. She started smoking about 40 years ago. She has a 37.50 pack-year smoking history. She has never used smokeless tobacco. She reports that she does not drink alcohol or use drugs. Family History: family history includes Colon Cancer in her father and sister; Diabetes in her father, paternal aunt, paternal aunt, paternal uncle, and paternal uncle; Heart Disease in her mother; Other in her father. The patients home medications have been reviewed.     Allergies: Statins    -------------------------------------------------- RESULTS -------------------------------------------------    LABS:  Results for orders placed or performed during the hospital encounter of 08/14/20   Lactate, Sepsis   Result Value Ref Range    Lactic Acid, Sepsis 2.8 (H) 0.5 - 1.9 mmol/L   Lactate, Sepsis   Result Value Ref Range    Lactic Acid, Sepsis 1.4 0.5 - 1.9 mmol/L   CBC Auto Differential   Result Value Ref Range    WBC 7.2 4.5 - 11.5 E9/L    RBC 3.53 3.50 - 5.50 E12/L    Hemoglobin 9.0 (L) 11.5 - 15.5 g/dL    Hematocrit 29.4 (L) 34.0 - 48.0 %    MCV 83.3 80.0 - 99.9 fL    MCH 25.5 (L) 26.0 - 35.0 pg    MCHC 30.6 (L) 32.0 - 34.5 %    RDW 15.7 (H) 11.5 - 15.0 fL    Platelets 420 165 - 422 E9/L    MPV 9.6 7.0 - 12.0 fL    Neutrophils % 72.8 43.0 - 80.0 %    Immature Granulocytes % 0.7 0.0 - 5.0 %    Lymphocytes % 15.5 (L) 20.0 - 42.0 %    Monocytes % 8.7 2.0 - 12.0 %    Eosinophils % 1.7 0.0 - 6.0 %    Basophils % 0.6 0.0 - 2.0 %    Neutrophils Absolute 5.26 1.80 - 7.30 E9/L    Immature Granulocytes # 0.05 E9/L    Lymphocytes Absolute 1.12 (L) 1.50 - 4.00 E9/L    Monocytes Absolute 0.63 0.10 - 0.95 E9/L    Eosinophils Absolute 0.12 0.05 - 0.50 E9/L    Basophils Absolute 0.04 0.00 - 0.20 H1/P   Basic Metabolic Panel w/ Reflex to MG   Result Value Ref Range    Sodium 138 132 - 146 mmol/L    Potassium reflex Magnesium 4.5 3.5 - 5.0 mmol/L    Chloride 93 (L) 98 - 107 mmol/L    CO2 33 (H) 22 - 29 mmol/L    Anion Gap 12 7 - 16 mmol/L    Glucose 273 (H) 74 - 99 mg/dL    BUN 16 8 - 23 mg/dL    CREATININE 0.7 0.5 - 1.0 mg/dL    GFR Non-African American >60 >=60 mL/min/1.73    GFR African American >60     Calcium 9.7 8.6 - 10.2 mg/dL   Troponin   Result Value Ref Range    Troponin <0.01 0.00 - 0.03 ng/mL   Brain Natriuretic Peptide   Result Value Ref Range    Pro- (H) 0 - 125 pg/mL   Urinalysis, reflex to microscopic   Result Value Ref Range    Color, UA Yellow Straw/Yellow    Clarity, UA Clear Clear    Glucose, Ur >=1000 (A) Negative mg/dL    Bilirubin Urine Negative Negative    Ketones, Urine Negative Negative mg/dL    Specific Gravity, UA 1.020 1.005 - 1.030    Blood, Urine TRACE-INTACT Negative    pH, UA 7.0 5.0 - 9.0    Protein, UA TRACE Negative mg/dL    Urobilinogen, Urine 0.2 <2.0 E.U./dL    Nitrite, Urine Negative Negative    Leukocyte Esterase, Urine Negative Negative   Microscopic Urinalysis   Result Value Ref Range    WBC, UA 0-1 0 - 5 /HPF    RBC, UA 0-1 0 - 2 /HPF    Epithelial Cells, UA MODERATE /HPF    Bacteria, UA FEW (A) None Seen /HPF    Amorphous, UA FEW    POCT Glucose   Result Value Ref Range    Meter Glucose 488 (H) 74 - 99 mg/dL   POCT Glucose   Result Value Ref Range    Meter Glucose 480 (H) 74 - 99 mg/dL   POCT Glucose   Result Value Ref Range    Meter Glucose 333 (H) 74 - 99 mg/dL   EKG 12 Lead   Result Value Ref Range    Ventricular Rate 113 BPM    Atrial Rate 113 BPM    P-R Interval 154 ms    QRS Duration 106 ms    Q-T Interval 356 ms    QTc Calculation (Bazett) 488 ms    P Axis 55 degrees    R Axis 60 degrees    T Axis 60 degrees       RADIOLOGY:  XR CHEST PORTABLE   Final Result   Bibasilar airspace disease. Findings likely represent mild pulmonary   edema but infection and/or aspiration cannot be excluded in the right   clinical setting. EKG:  This EKG is signed and interpreted by me. Rate: 113  Rhythm: Sinus  Interpretation: no acute changes  Comparison: stable as compared to patient's most recent EKG      ------------------------- NURSING NOTES AND VITALS REVIEWED ---------------------------  Date / Time Roomed:  8/14/2020  8:12 PM  ED Bed Assignment:  [id number]    The nursing notes within the ED encounter and vital signs as below have been reviewed. Patient Vitals for the past 24 hrs:   BP Temp Temp src Pulse Resp SpO2 Height Weight   08/15/20 1625 (!) 154/68 98 °F (36.7 °C) -- 164 20 -- -- --   08/15/20 1510 (!) 152/78 97.9 °F (36.6 °C) Temporal 106 20 98 % -- --   08/15/20 0815 (!) 151/71 97.3 °F (36.3 °C) Temporal 105 20 98 % -- --   08/15/20 0709 -- -- -- -- -- -- -- (!) 307 lb 12.8 oz (139.6 kg)   08/15/20 0400 (!) 147/78 98 °F (36.7 °C) Temporal 108 22 97 % -- --   08/15/20 0130 (!) 180/70 97.7 °F (36.5 °C) Temporal 116 24 91 % -- --   08/15/20 0023 (!) 175/88 -- -- 111 22 93 % -- --   08/14/20 2304 (!) 177/87 -- -- 112 18 95 % -- --   08/14/20 2300 -- -- -- -- 20 -- -- --   08/14/20 2111 -- -- -- -- 29 92 % -- --   08/14/20 2014 (!) 193/89 98.4 °F (36.9 °C) Tympanic 118 24 90 % 5' 3\" (1.6 m) (!) 350 lb (158.8 kg)       Oxygen Saturation Interpretation: Normal    ------------------------------------------ PROGRESS NOTES ------------------------------------------  Re-evaluation(s):  Patients symptoms are improving  Repeat physical examination is improved    Counseling:  I have spoken with the patient and discussed todays results, in addition to providing specific details for the plan of care and counseling regarding the diagnosis and prognosis. Their questions are answered at this time and they are agreeable with the plan of admission.    --------------------------------- ADDITIONAL PROVIDER NOTES ---------------------------------  Consultations:  Spoke with Dr. Anthony Ren. Discussed case. They will admit the patient.     This patient's ED course included: a personal history and physicial examination, re-evaluation prior to disposition, IV medications, cardiac monitoring and continuous pulse oximetry    This patient has remained hemodynamically stable during their ED course. Diagnosis:  1. Acute respiratory failure with hypoxia ([address]    2. Acute on chronic heart failure, unspecified heart failure type ([address]      Disposition:  Patient's disposition: Admit to telemetry  Patient's condition is fair. 8/15/20, 6:48 PM EDT.     This note is prepared by Qiana Reynolds MD -PGY-1         Qiana Reynolds MD  Resident  08/15/20 [id number]

## 2020-08-15 NOTE — PROGRESS NOTES
Called placed via perfect serve to Dr. Jethro Martinez to inform him of patient arrival to unit, home medications reviewed, blood pressure of 180/70, and to request admission orders. Will await orders.

## 2020-08-15 NOTE — ED NOTES
Patient ambulated with walker and this RN, patient on 6L NC while ambulating and dropped from mid 90s while at rest to 87-88 while ambulating, patient tachycardic as well going from 110s at rest to 146 while ambulating.       Abe Venegas RN  08/14/20 [id number]

## 2020-08-15 NOTE — PLAN OF CARE
Problem: Pain:  Description: Pain management should include both nonpharmacologic and pharmacologic interventions.   Goal: Pain level will decrease  Description: Pain level will decrease  8/15/2020 1422 by Lo Palacios RN  Outcome: Met This Shift  8/15/2020 0838 by Brayden Isidro RN  Outcome: Met This Shift  Goal: Control of acute pain  Description: Control of acute pain  Outcome: Met This Shift     Problem: Skin Integrity:  Goal: Will show no infection signs and symptoms  Description: Will show no infection signs and symptoms  8/15/2020 1422 by Lo Palacios RN  Outcome: Met This Shift  8/15/2020 0838 by Brayden Isidro RN  Outcome: Ongoing  Goal: Absence of new skin breakdown  Description: Absence of new skin breakdown  8/15/2020 0838 by Brayden Isidro RN  Outcome: Met This Shift     Problem: Falls - Risk of:  Goal: Will remain free from falls  Description: Will remain free from falls  8/15/2020 1422 by Lo Palacios RN  Outcome: Met This Shift  8/15/2020 0838 by Brayden Isidro RN  Outcome: Met This Shift

## 2020-08-15 NOTE — PLAN OF CARE
Problem: Pain:  Goal: Pain level will decrease  Description: Pain level will decrease  Outcome: Met This Shift     Problem: Skin Integrity:  Goal: Absence of new skin breakdown  Description: Absence of new skin breakdown  Outcome: Met This Shift     Problem: Falls - Risk of:  Goal: Will remain free from falls  Description: Will remain free from falls  Outcome: Met This Shift     Problem: Skin Integrity:  Goal: Will show no infection signs and symptoms  Description: Will show no infection signs and symptoms  Outcome: Ongoing

## 2020-08-15 NOTE — ED NOTES
Bed: 15  Expected date:   Expected time:   Means of arrival:   Comments:  Atiya Chapman RN  08/14/20 2012

## 2020-08-15 NOTE — PROGRESS NOTES
Received a call from pharmacist Bassam to confirm with patient that she takes 45 units of Humilin R U-500 3 times daily with meals. Spoke with patient and she verbally confirmed she takes the 45 units of Humulin R U-500 insulin 3 times daily with meals. Called placed to pharmacist to inform them of patient confirmation of insulin dosage.

## 2020-08-15 NOTE — H&P
INTERNAL MEDICINE HISTORY AND PHYSICAL EXAM     CHIEF COMPLAINT:   Chief Complaint   Patient presents with    Shortness of Breath     (started last night, normally wear 4L/NC at home, per EMS she was sat at 95% at home and slowly declined on way here, now 90% on 6L, patient did do home breathing tx prior to EMS arriving)        HISTORY OF PRESENTING ILLNESS:   75years old female who presented to emergency department with a chief complaints of progressively worsening shortness of breath fatigue. Patient has multiple medical problems including diastolic congestive heart failure COPD morbid obesity obstructive sleep apnea on home at 4 L of oxygen. She was recently here for chronic coccyx wound infection. She has chronic lymphedema. She was discharged to inpatient rehab where she spent 5 weeks and was discharged 2 weeks ago. She was noted to have worsening of her chronic respiratory failure requiring 6 L of oxygen. She was afebrile, chest x-ray showing pulmonary edema with elevated BNP. Pneumonic process could not be excluded, patient was admitted for further care.   Hospital medicine was contacted for admission to the hospital.      PAST MEDICAL HISTORY  Past Medical History:   Diagnosis Date    Anal fissure     Anemia 10/6/2017    Anxiety and depression     Arthritis     Asthma     Atrial fibrillation ([address]     Eliquis    Blood transfusion     long time no reaction    CHF (congestive heart failure) (HCC)     Diastolic    COPD (chronic obstructive pulmonary disease) ([address]     Disc disorder     DM2 (diabetes mellitus, type 2) ([address]     on insulin    Fall due to stumbling 10/6/2017    GERD (gastroesophageal reflux disease)     Hx of blood clots     Hyperlipidemia     Hypertension     Inappropriate sinus tachycardia     LVH (left ventricular hypertrophy)     moderate    Lymphadenitis, chronic 4/13/2018    Occipital neuralgia 11/2/2011    Respiratory acidosis 10/7/2017    Sinus tachycardia 2/16/2015       PAST SURGICAL HISTORY  Past Surgical History:   Procedure Laterality Date    ANOSCOPY  10/25/2006    injection of anal sphincter with Botox, Franklyn Ortiz/Timmy, [facility]    ANOSCOPY  4/9/2007    injection of anal sphincter with Botox, Dr. Patti Ibrahim, [address] [place] [address] ANOSCOPY  6/13/2007    injection of anal sphincter with Botox, Franklyn Mendez [facility]    ANUS SURGERY  4/4/2006    Lateral sphincterotomy for chronic anal fissure, Dr. Keila Farah, [facility]  4/18/2012    anal exam and injection of Botox 100 units into anal sphincter, Laila Mendez, [facility]    APPENDECTOMY  1965   [address]    COLONOSCOPY  1/20/2004    cecal polyp, bx (no pathologic changes), Dr. Monisha Wang, [address] [place] [address] COLONOSCOPY  8/11/2006    snare polypectomy terminal ileum polyp (granulation tissue polyp) and anal polyp (inflammatory/papilla), Dr. Patti Ibrahim, [address] [place] [address] COLONOSCOPY  3/23/2012    bx/cauterization proximal ascending colon polyp, injection of anal sphincter with Botox, Dr. Patti Ibrahim, [place] [address]    [address] [facility], SKIN, SUB-Q TISSUE,MUSCLE,=<20 SQ CM Left 11/30/2018    LEFT LEG EXCISIONAL  DEBRIDEMENT WITH TISSUE BIOPSY performed by Paris Foster DPM at [address] [facility] ENDOSCOPY  1/20/2004    gastritis, bx (no H pylori), Dr. Monisha Wang, [address] ENDOSCOPY N/A 6/1/2018    EGD BIOPSY performed by Bassam Massey MD at [address] [facility] N/A 11/9/2018    EGD BIOPSY performed by Bassam Massey MD at [address] [facility] History   Problem Relation Age of Onset    Heart Disease Mother     Diabetes Father     Colon Cancer Father     Other Father         BLINDNESS    Colon Cancer Sister     Diabetes Paternal Aunt     Diabetes Paternal Uncle     Diabetes Paternal Aunt  Diabetes Paternal Uncle           SOCIAL HISTORY   reports that she quit smoking about 15 years ago. Her smoking use included cigarettes. She started smoking about 40 years ago. She has a 37.50 pack-year smoking history. She has never used smokeless tobacco. She reports that she does not drink alcohol or use drugs. MEDICATIONS   Medications Prior to Admission: gabapentin (NEURONTIN) 100 MG capsule, Take 100 mg by mouth 3 times daily. apixaban (ELIQUIS) 5 MG TABS tablet, TAKE 1 TABLET BY MOUTH TWICE A DAY  dilTIAZem (CARDIZEM CD) 240 MG extended release capsule, TAKE 1 CAPSULE BY MOUTH TWICE A DAY  omega-3 acid ethyl esters (LOVAZA) 1 g capsule, Take 2 capsules by mouth 2 times daily  insulin regular human (HUMULIN R U-500 KWIKPEN) 500 UNIT/ML SOPN concentrated injection pen, Inject 45 Units into the skin 3 times daily (with meals) 45 units with meals. This is a highly concentrated insulin. After attaching the pen needle, prime with 5 units to ensure proper dosing. insulin regular human (HUMULIN R U-500) 500 UNIT/ML concentrated injection vial, Inject 45 Units into the skin 3 times daily (before meals)  Magic Mouthwash (MIRACLE MOUTHWASH), Swish and spit 5 mLs 4 times daily as needed for Irritation  glucose monitoring kit (FREESTYLE) monitoring kit, Use once. traZODone (DESYREL) 50 MG tablet, TAKE 1 TABLET BY MOUTH EVERY DAY AT BEDTIME AS NEEDED FOR SLEEP  bumetanide (BUMEX) 1 MG tablet, TAKE 1 TABLET BY MOUTH TWICE A DAY  ipratropium-albuterol (DUONEB) 0.5-2.5 (3) MG/3ML SOLN nebulizer solution, USE 1 VIAL VIA NEBULIZER EVERY 4 HOURS AS NEEDED FOR SHORTNESS OF BREATH  blood glucose monitor strips, Test 4 times a day & as needed for symptoms of irregular blood glucose.   ferrous sulfate 325 (65 Fe) MG tablet, TAKE 1 TABLET BY MOUTH EVERY DAY WITH BREAKFAST  montelukast (SINGULAIR) 10 MG tablet, Take 1 tablet by mouth nightly  escitalopram (LEXAPRO) 20 MG tablet, TAKE 1 TABLET BY MOUTH DAILY  albuterol sulfate  (90 Base) MCG/ACT inhaler, Inhale 2 puffs into the lungs 4 times daily as needed for Wheezing  blood glucose test strips (ASCENSIA AUTODISC VI;ONE TOUCH ULTRA TEST VI) strip, Use 4 times daily  lansoprazole (PREVACID) 30 MG delayed release capsule, Take 1 capsule by mouth daily  BD INSULIN SYRINGE U/F 31G X 5/16\" 1 ML MISC, USE AS DIRECTED  miconazole (MICOTIN) 2 % powder, Apply topically 2 times daily. metoprolol succinate (TOPROL XL) 200 MG extended release tablet, Take 0.5 tablets by mouth 2 times daily  acetaminophen (TYLENOL) 325 MG tablet, Take 650 mg by mouth every 6 hours as needed for Pain  OXYGEN, Inhale 4 L into the lungs continuous   Elastic Bandages & Supports MISC, 20-30 mmHg bilateral compression stockings Size to fit Dx:  Edema Knee high  Insulin Pen Needle ([employer] PEN NEEDLES 31G) 31G X 8 MM MISC, 1 each by Does not apply route daily  gabapentin (NEURONTIN) 300 MG capsule, Take 1 capsule by mouth 3 times daily for 30 days. Insulin Pen Needle 30G X 8 MM MISC, 1 each by Does not apply route daily  vitamin D (ERGOCALCIFEROL) 1.25 MG (79124 UT) CAPS capsule, TAKE 1 CAPSULE BY MOUTH TWICE WEEKLY  spironolactone (ALDACTONE) 25 MG tablet, Take 1 tablet by mouth daily  nystatin (MYCOSTATIN) 488917 UNIT/GM cream, Apply topically 2 times daily.   Icosapent Ethyl (VASCEPA) 1 g CAPS capsule, Take 2 capsules by mouth 2 times daily  ULTICARE ALCOHOL SWABS 70 % PADS, USE 3 TIMES A DAY  [DISCONTINUED] Insulin Pen Needle 31G X 8 MM MISC, 1 each by Does not apply route daily  glucose (GLUTOSE) 40 % GEL, Take 37.5 mLs by mouth as needed (hypoglycemia)  Current Facility-Administered Medications   Medication Dose Route Frequency Provider Last Rate Last Dose    sodium chloride flush 0.9 % injection 10 mL  10 mL Intravenous 2 times per day Sonu Milner MD        sodium chloride flush 0.9 % injection 10 mL  10 mL Intravenous PRN Sonu Milner MD        acetaminophen (TYLENOL) tablet 650 mg  650 mg Oral Q6H PRN Ally Romero MD        Or    acetaminophen (TYLENOL) suppository 650 mg  650 mg Rectal Q6H PRN Ally Romero MD        polyethylene glycol (GLYCOLAX) packet 17 g  17 g Oral Daily PRN Ally Romero MD        promethazine (PHENERGAN) tablet 12.5 mg  12.5 mg Oral Q6H PRN Ally Romero MD        Or    ondansetron (ZOFRAN) injection 4 mg  4 mg Intravenous Q6H PRN Ally Romero MD        enoxaparin (LOVENOX) injection 40 mg  40 mg Subcutaneous Daily Ally Romero MD        spironolactone (ALDACTONE) tablet 25 mg  25 mg Oral Daily Ally Romero MD        apixaban (ELIQUIS) tablet 5 mg  1 tablet Oral BID Ally Romero MD        escitalopram (LEXAPRO) tablet 20 mg  1 tablet Oral Daily Ally Romero MD        ferrous sulfate (IRON 325) tablet 325 mg  325 mg Oral Daily with breakfast Ally Romero MD        gabapentin (NEURONTIN) capsule 300 mg  300 mg Oral TID Ally Romero MD        insulin regular human (humuLIN R U-500) 500 UNIT/ML concentrated injection vial 45 Units  45 Units Subcutaneous TID AC Ally Romero MD        ipratropium-albuterol (DUONEB) nebulizer solution 1 ampule  1 ampule Inhalation Q4H PRN Ally Romero MD        montelukast (SINGULAIR) tablet 10 mg  10 mg Oral Nightly Ally Romero MD        pantoprazole (PROTONIX) tablet 20 mg  20 mg Oral QAM AC Ally Romero MD        insulin lispro (HUMALOG) injection vial 0-12 Units  0-12 Units Subcutaneous TID WC Ally Romero MD        insulin lispro (HUMALOG) injection vial 0-6 Units  0-6 Units Subcutaneous Nightly MD Parmjit Robison [START ON 8/16/2020] bumetanide (BUMEX) injection 1 mg  1 mg Intravenous BID MD Parmjit Robison Berl Crumble ON 8/16/2020] dilTIAZem (CARDIZEM CD) extended release capsule 120 mg  120 mg Oral Daily MD Parmjit Robison [START ON 8/16/2020] metoprolol succinate (TOPROL XL) extended release tablet 50 mg  50 mg Oral Daily Ally Romero MD         Prior to Admission medications Medication Sig Start Date End Date Taking? Authorizing Provider   gabapentin (NEURONTIN) 100 MG capsule Take 100 mg by mouth 3 times daily. Yes Historical Provider, MD   apixaban (ELIQUIS) 5 MG TABS tablet TAKE 1 TABLET BY MOUTH TWICE A DAY 8/4/20  Yes Bianca Alvarez DO   dilTIAZem (CARDIZEM CD) 240 MG extended release capsule TAKE 1 CAPSULE BY MOUTH TWICE A DAY 7/2/20  Yes Felipe Kennedy DO   omega-3 acid ethyl esters (LOVAZA) 1 g capsule Take 2 capsules by mouth 2 times daily 6/1/20  Yes Yvonne Carmichael MD   insulin regular human (HUMULIN R U-500 KWIKPEN) 500 UNIT/ML SOPN concentrated injection pen Inject 45 Units into the skin 3 times daily (with meals) 45 units with meals. This is a highly concentrated insulin. After attaching the pen needle, prime with 5 units to ensure proper dosing. 6/1/20  Yes Yvonne Carmichael MD   insulin regular human (HUMULIN R U-500) 500 UNIT/ML concentrated injection vial Inject 45 Units into the skin 3 times daily (before meals) 6/1/20  Yes Yvonne Carmichael MD   Magic Mouthwash (MIRACLE MOUTHWASH) Swish and spit 5 mLs 4 times daily as needed for Irritation 5/28/20  Yes Felipe Kennedy,    glucose monitoring kit (FREESTYLE) monitoring kit Use once. 5/26/20  Yes Felipe Kennedy DO   traZODone (DESYREL) 50 MG tablet TAKE 1 TABLET BY MOUTH EVERY DAY AT BEDTIME AS NEEDED FOR SLEEP 5/18/20  Yes Jennifer Alvarez, DO   bumetanide (BUMEX) 1 MG tablet TAKE 1 TABLET BY MOUTH TWICE A DAY 5/14/20  Yes Felipe Kennedy DO   ipratropium-albuterol (DUONEB) 0.5-2.5 (3) MG/3ML SOLN nebulizer solution USE 1 VIAL VIA NEBULIZER EVERY 4 HOURS AS NEEDED FOR SHORTNESS OF BREATH 5/14/20  Yes Felipe Kennedy, DO   blood glucose monitor strips Test 4 times a day & as needed for symptoms of irregular blood glucose.  2/26/20  Yes Diana Lincoln, DO   ferrous sulfate 325 (65 Fe) MG tablet TAKE 1 TABLET BY MOUTH EVERY DAY WITH BREAKFAST 1/24/20  Yes DO nicolas Nevarez (SINGULAIR) 10 MG tablet Take 1 tablet by mouth nightly 1/23/20  Yes Jennifer Alvarez, DO   escitalopram (LEXAPRO) 20 MG tablet TAKE 1 TABLET BY MOUTH DAILY 1/23/20  Yes Shirley Alvarez, DO   albuterol sulfate  (90 Base) MCG/ACT inhaler Inhale 2 puffs into the lungs 4 times daily as needed for Wheezing 1/23/20  Yes Shirley Alvarez, DO   blood glucose test strips (ASCENSIA AUTODISC VI;ONE TOUCH ULTRA TEST VI) strip Use 4 times daily 1/23/20  Yes Shirley Alvarez, DO   lansoprazole (PREVACID) 30 MG delayed release capsule Take 1 capsule by mouth daily 11/27/19  Yes Shirley Alvarez, DO   BD INSULIN SYRINGE U/F 31G X 5/16\" 1 ML MISC USE AS DIRECTED 10/18/19  Yes Historical Provider, MD   miconazole (MICOTIN) 2 % powder Apply topically 2 times daily. 9/24/19  Yes Mehran Weeks MD   metoprolol succinate (TOPROL XL) 200 MG extended release tablet Take 0.5 tablets by mouth 2 times daily 9/23/19  Yes Terry Montes,    acetaminophen (TYLENOL) 325 MG tablet Take 650 mg by mouth every 6 hours as needed for Pain   Yes Historical Provider, MD   OXYGEN Inhale 4 L into the lungs continuous    Yes Historical Provider, MD   Elastic Bandages & Supports MISC 20-30 mmHg bilateral compression stockings  Size to fit  Dx:  Edema  Knee high 12/14/17  Yes Jena Calvillo MD   Insulin Pen Needle ([employer] PEN NEEDLES 31G) 31G X 8 MM MISC 1 each by Does not apply route daily 6/1/20   John Patino MD   gabapentin (NEURONTIN) 300 MG capsule Take 1 capsule by mouth 3 times daily for 30 days.  5/28/20 6/27/20  Shirley Alvarez DO   Insulin Pen Needle 30G X 8 MM MISC 1 each by Does not apply route daily 3/3/20   Shirley Alvarez, DO   vitamin D (ERGOCALCIFEROL) 1.25 MG (21897 UT) CAPS capsule TAKE 1 CAPSULE BY MOUTH TWICE WEEKLY 1/24/20   Shirley Alvarez DO   spironolactone (ALDACTONE) 25 MG tablet Take 1 tablet by mouth daily 12/19/19   Shirley Alvarez DO   nystatin (MYCOSTATIN) 811935 UNIT/GM cream Apply topically 2 times daily. 12/7/19   Bc Ibrahim., DO   Icosapent Ethyl (VASCEPA) 1 g CAPS capsule Take 2 capsules by mouth 2 times daily 11/22/19   Michelle Alvarez,    ULTICARE ALCOHOL SWABS 70 % PADS USE 3 TIMES A DAY 10/14/19   Historical Provider, MD   glucose (GLUTOSE) 40 % GEL Take 37.5 mLs by mouth as needed (hypoglycemia) 12/20/18   RAUL Gr - CNP       ALLERGIES   Statins    REVIEW OF SYSTEMS:  12 point review of system was done in detail with the patient and is negative except as above in HPI. PHYSICAL EXAM:  VS: BP (!) 180/70   Pulse 116   Temp 97.7 °F (36.5 °C) (Temporal)   Resp 24   Ht 5' 3\" (1.6 m)   Wt (!) 350 lb (158.8 kg)   LMP  (LMP Unknown)   SpO2 91%   BMI 62.00 kg/m²   General Appearance: alert and oriented to person, place and time, in no acute distress  HEENT: normocephalic and atraumatic, pupils equal, round, and reactive to light, Ssupple and non-tender without mass, no thyromegaly   Cardiovascular: normal rate, regular rhythm, normal S1 and S2, no murmurs, rubs, clicks, or gallops, distal pulses intact, no carotid bruits, no JVD  Pulmonary/Chest: Diminished breath sounds bilaterally  Abdomen: soft, non-tender, non-distended, normal bowel sounds, no masses   Extremities: Chronic lymphedema bilaterally present.   +3 to +4 edema bilaterally  Neurological: alert, oriented, normal speech, no focal findings or movement disorder noted    LABS:  Recent Results (from the past 24 hour(s))   Lactate, Sepsis    Collection Time: 08/14/20  8:46 PM   Result Value Ref Range    Lactic Acid, Sepsis 2.8 (H) 0.5 - 1.9 mmol/L   CBC Auto Differential    Collection Time: 08/14/20  8:46 PM   Result Value Ref Range    WBC 7.2 4.5 - 11.5 E9/L    RBC 3.53 3.50 - 5.50 E12/L    Hemoglobin 9.0 (L) 11.5 - 15.5 g/dL    Hematocrit 29.4 (L) 34.0 - 48.0 %    MCV 83.3 80.0 - 99.9 fL    MCH 25.5 (L) 26.0 - 35.0 pg    MCHC 30.6 (L) 32.0 - 34.5 %    RDW 15.7 (H) 11.5 - 15.0 fL    Platelets 203 436 - 450 E9/L    MPV 9.6 7.0 - 12.0 fL    Neutrophils % 72.8 43.0 - 80.0 %    Immature Granulocytes % 0.7 0.0 - 5.0 %    Lymphocytes % 15.5 (L) 20.0 - 42.0 %    Monocytes % 8.7 2.0 - 12.0 %    Eosinophils % 1.7 0.0 - 6.0 %    Basophils % 0.6 0.0 - 2.0 %    Neutrophils Absolute 5.26 1.80 - 7.30 E9/L    Immature Granulocytes # 0.05 E9/L    Lymphocytes Absolute 1.12 (L) 1.50 - 4.00 E9/L    Monocytes Absolute 0.63 0.10 - 0.95 E9/L    Eosinophils Absolute 0.12 0.05 - 0.50 E9/L    Basophils Absolute 0.04 0.00 - 0.20 W3/N   Basic Metabolic Panel w/ Reflex to MG    Collection Time: 08/14/20  8:46 PM   Result Value Ref Range    Sodium 138 132 - 146 mmol/L    Potassium reflex Magnesium 4.5 3.5 - 5.0 mmol/L    Chloride 93 (L) 98 - 107 mmol/L    CO2 33 (H) 22 - 29 mmol/L    Anion Gap 12 7 - 16 mmol/L    Glucose 273 (H) 74 - 99 mg/dL    BUN 16 8 - 23 mg/dL    CREATININE 0.7 0.5 - 1.0 mg/dL    GFR Non-African American >60 >=60 mL/min/1.73    GFR African American >60     Calcium 9.7 8.6 - 10.2 mg/dL   Troponin    Collection Time: 08/14/20  8:46 PM   Result Value Ref Range    Troponin <0.01 0.00 - 0.03 ng/mL   Brain Natriuretic Peptide    Collection Time: 08/14/20  8:46 PM   Result Value Ref Range    Pro- (H) 0 - 125 pg/mL   Urinalysis, reflex to microscopic    Collection Time: 08/14/20 11:26 PM   Result Value Ref Range    Color, UA Yellow Straw/Yellow    Clarity, UA Clear Clear    Glucose, Ur >=1000 (A) Negative mg/dL    Bilirubin Urine Negative Negative    Ketones, Urine Negative Negative mg/dL    Specific Gravity, UA 1.020 1.005 - 1.030    Blood, Urine TRACE-INTACT Negative    pH, UA 7.0 5.0 - 9.0    Protein, UA TRACE Negative mg/dL    Urobilinogen, Urine 0.2 <2.0 E.U./dL    Nitrite, Urine Negative Negative    Leukocyte Esterase, Urine Negative Negative   Microscopic Urinalysis    Collection Time: 08/14/20 11:26 PM   Result Value Ref Range    WBC, UA 0-1 0 - 5 /HPF    RBC, UA 0-1 0 - 2 /HPF    Epithelial Cells, UA MODERATE /HPF    Bacteria, UA FEW (A) None Seen /HPF    Amorphous, UA FEW        RADIOLOGY:  Xr Chest Portable    Result Date: 2020  Patient MRN: [id number] : 1955 Age:  75 years Gender: Female Order Date: 2020 9:43 PM Exam: XR CHEST PORTABLE Number of Images: 1 view Indication:   SOB SOB Comparison: 20. FINDINGS: Unchanged size of the cardiomediastinal silhouette. Bibasilar predominant airspace disease. No effusion or pneumothorax. Bones are unchanged. Bibasilar airspace disease. Findings likely represent mild pulmonary edema but infection and/or aspiration cannot be excluded in the right clinical setting. ASSESSMENT AND PLAN  Active Problems:    Acute on chronic respiratory failure with hypoxia (HCC)    CHF (congestive heart failure), NYHA class I, acute on chronic, combined ([address]  Resolved Problems:    * No resolved hospital problems. *    Impression  1. Acute on chronic hypoxic respiratory failure  2. Acute on chronic combined systolic and diastolic congestive heart failure  on presentation. Echo report from 2018 showing EF of 55 to 40% stage II diastolic dysfunction. 3. Acute COPD exacerbation with possible bronchitis/pneumonia  4. Anemia of chronic disease, hemoglobin 9.0 on presentation  5. Chronic lymphedema bilaterally  6. History of hypertension  7. History of hyperlipidemia  8. Struve anxiety and depression  9. History of diabetes mellitus uncontrolled   10. History of paroxysmal atrial fibrillation, patient is on Eliquis at home  11. History of DVT on Eliquis as mentioned above  12. History of obesity and obstructive sleep apnea  13. History of gastroesophageal reflux disease  14. Patient is a full code    Plan  · We will start patient on careful diuresis, resume cardiac medication. · Resume Eliquis.   · Cannot exclude pneumonic process will start patient on doxycycline and ceftriaxone  · Patient has uncontrolled diabetes mellitus history with hemoglobin A1c checked last admission was 17. We will hold off on steroids  · Echocardiogram requested  · We will consult cardiology  · Consider pulmonary consultation if needed  · Resume home insulin dose, adding insulin sliding scale. Consider endocrinology consultation    DVT prophylaxis: Eliquis   CODE STATUS: Patient is a full code  Discharge plan: Patient can be discharged next 3 to 4 days to home with and without home health.,  Pending clinical improvement, pending work-up and clearance by consulting services. Please note that over 35 minutes was spent in evaluating the patient, review of records and results, discussion with staff/family, etc.    Khushboo Vazquez MD  [facility]   [phone]    NOTE: This report was transcribed using voice recognition software. Every effort was made to ensure accuracy; however, inadvertent computerized transcription errors may be present.

## 2020-08-16 LAB
ANION GAP SERPL CALCULATED.3IONS-SCNC: 11 MMOL/L (ref 7–16)
BUN BLDV-MCNC: 23 MG/DL (ref 8–23)
CALCIUM SERPL-MCNC: 10 MG/DL (ref 8.6–10.2)
CHLORIDE BLD-SCNC: 94 MMOL/L (ref 98–107)
CO2: 31 MMOL/L (ref 22–29)
CREAT SERPL-MCNC: 0.7 MG/DL (ref 0.5–1)
GFR AFRICAN AMERICAN: >60
GFR NON-AFRICAN AMERICAN: >60 ML/MIN/1.73
GLUCOSE BLD-MCNC: 274 MG/DL (ref 74–99)
MAGNESIUM: 1.8 MG/DL (ref 1.6–2.6)
METER GLUCOSE: 178 MG/DL (ref 74–99)
METER GLUCOSE: 269 MG/DL (ref 74–99)
METER GLUCOSE: 306 MG/DL (ref 74–99)
METER GLUCOSE: 325 MG/DL (ref 74–99)
POTASSIUM REFLEX MAGNESIUM: 4.7 MMOL/L (ref 3.5–5)
PROCALCITONIN: 0.17 NG/ML (ref 0–0.08)
SODIUM BLD-SCNC: 136 MMOL/L (ref 132–146)

## 2020-08-16 PROCEDURE — 6360000002 HC RX W HCPCS: Performed by: EMERGENCY MEDICINE

## 2020-08-16 PROCEDURE — 2580000003 HC RX 258: Performed by: EMERGENCY MEDICINE

## 2020-08-16 PROCEDURE — 82962 GLUCOSE BLOOD TEST: CPT

## 2020-08-16 PROCEDURE — 6370000000 HC RX 637 (ALT 250 FOR IP): Performed by: EMERGENCY MEDICINE

## 2020-08-16 PROCEDURE — 6360000002 HC RX W HCPCS: Performed by: INTERNAL MEDICINE

## 2020-08-16 PROCEDURE — 6370000000 HC RX 637 (ALT 250 FOR IP): Performed by: INTERNAL MEDICINE

## 2020-08-16 PROCEDURE — 83735 ASSAY OF MAGNESIUM: CPT

## 2020-08-16 PROCEDURE — 2140000000 HC CCU INTERMEDIATE R&B

## 2020-08-16 PROCEDURE — 80048 BASIC METABOLIC PNL TOTAL CA: CPT

## 2020-08-16 PROCEDURE — 36415 COLL VENOUS BLD VENIPUNCTURE: CPT

## 2020-08-16 PROCEDURE — 2500000003 HC RX 250 WO HCPCS: Performed by: INTERNAL MEDICINE

## 2020-08-16 PROCEDURE — 84145 PROCALCITONIN (PCT): CPT

## 2020-08-16 PROCEDURE — 99232 SBSQ HOSP IP/OBS MODERATE 35: CPT | Performed by: INTERNAL MEDICINE

## 2020-08-16 PROCEDURE — 2700000000 HC OXYGEN THERAPY PER DAY

## 2020-08-16 RX ORDER — INSULIN GLARGINE 100 [IU]/ML
15 INJECTION, SOLUTION SUBCUTANEOUS 2 TIMES DAILY
Status: DISCONTINUED | OUTPATIENT
Start: 2020-08-16 | End: 2020-08-17

## 2020-08-16 RX ORDER — MAGNESIUM SULFATE IN WATER 40 MG/ML
2 INJECTION, SOLUTION INTRAVENOUS ONCE
Status: COMPLETED | OUTPATIENT
Start: 2020-08-16 | End: 2020-08-16

## 2020-08-16 RX ADMIN — ACETAMINOPHEN 650 MG: 325 TABLET, FILM COATED ORAL at 03:03

## 2020-08-16 RX ADMIN — INSULIN HUMAN 45 UNITS: 500 INJECTION, SOLUTION SUBCUTANEOUS at 16:06

## 2020-08-16 RX ADMIN — GABAPENTIN 300 MG: 300 CAPSULE ORAL at 14:38

## 2020-08-16 RX ADMIN — INSULIN LISPRO 5 UNITS: 100 INJECTION, SOLUTION INTRAVENOUS; SUBCUTANEOUS at 21:49

## 2020-08-16 RX ADMIN — DEXTROSE MONOHYDRATE 10 MG/HR: 50 INJECTION, SOLUTION INTRAVENOUS at 07:01

## 2020-08-16 RX ADMIN — INSULIN LISPRO 8 UNITS: 100 INJECTION, SOLUTION INTRAVENOUS; SUBCUTANEOUS at 06:45

## 2020-08-16 RX ADMIN — METOPROLOL SUCCINATE 100 MG: 100 TABLET, EXTENDED RELEASE ORAL at 09:00

## 2020-08-16 RX ADMIN — Medication 1 EACH: at 11:42

## 2020-08-16 RX ADMIN — MONTELUKAST SODIUM 10 MG: 10 TABLET, FILM COATED ORAL at 21:48

## 2020-08-16 RX ADMIN — BUMETANIDE 2 MG: 0.25 INJECTION INTRAMUSCULAR; INTRAVENOUS at 21:51

## 2020-08-16 RX ADMIN — APIXABAN 5 MG: 5 TABLET, FILM COATED ORAL at 21:49

## 2020-08-16 RX ADMIN — ESCITALOPRAM 20 MG: 10 TABLET, FILM COATED ORAL at 09:00

## 2020-08-16 RX ADMIN — DOXYCYCLINE HYCLATE 100 MG: 100 CAPSULE ORAL at 21:48

## 2020-08-16 RX ADMIN — Medication 1 EACH: at 16:07

## 2020-08-16 RX ADMIN — MAGNESIUM SULFATE HEPTAHYDRATE 2 G: 40 INJECTION, SOLUTION INTRAVENOUS at 14:38

## 2020-08-16 RX ADMIN — DIGOXIN 250 MCG: 0.25 INJECTION INTRAMUSCULAR; INTRAVENOUS at 09:00

## 2020-08-16 RX ADMIN — Medication 10 ML: at 21:49

## 2020-08-16 RX ADMIN — INSULIN GLARGINE 15 UNITS: 100 INJECTION, SOLUTION SUBCUTANEOUS at 14:38

## 2020-08-16 RX ADMIN — ACETAMINOPHEN 650 MG: 325 TABLET, FILM COATED ORAL at 21:48

## 2020-08-16 RX ADMIN — GABAPENTIN 300 MG: 300 CAPSULE ORAL at 09:00

## 2020-08-16 RX ADMIN — Medication 10 ML: at 09:00

## 2020-08-16 RX ADMIN — INSULIN LISPRO 3 UNITS: 100 INJECTION, SOLUTION INTRAVENOUS; SUBCUTANEOUS at 16:02

## 2020-08-16 RX ADMIN — SPIRONOLACTONE 25 MG: 25 TABLET ORAL at 09:00

## 2020-08-16 RX ADMIN — DILTIAZEM HYDROCHLORIDE 240 MG: 240 CAPSULE, EXTENDED RELEASE ORAL at 09:00

## 2020-08-16 RX ADMIN — INSULIN HUMAN 45 UNITS: 500 INJECTION, SOLUTION SUBCUTANEOUS at 06:45

## 2020-08-16 RX ADMIN — CEFTRIAXONE 2 G: 2 INJECTION, POWDER, FOR SOLUTION INTRAMUSCULAR; INTRAVENOUS at 06:45

## 2020-08-16 RX ADMIN — BUMETANIDE 2 MG: 0.25 INJECTION INTRAMUSCULAR; INTRAVENOUS at 09:00

## 2020-08-16 RX ADMIN — INSULIN GLARGINE 15 UNITS: 100 INJECTION, SOLUTION SUBCUTANEOUS at 21:49

## 2020-08-16 RX ADMIN — DOXYCYCLINE HYCLATE 100 MG: 100 CAPSULE ORAL at 09:00

## 2020-08-16 RX ADMIN — INSULIN HUMAN 45 UNITS: 500 INJECTION, SOLUTION SUBCUTANEOUS at 11:42

## 2020-08-16 RX ADMIN — APIXABAN 5 MG: 5 TABLET, FILM COATED ORAL at 08:59

## 2020-08-16 RX ADMIN — DEXTROSE MONOHYDRATE 5 MG/HR: 50 INJECTION, SOLUTION INTRAVENOUS at 16:13

## 2020-08-16 RX ADMIN — GABAPENTIN 300 MG: 300 CAPSULE ORAL at 21:48

## 2020-08-16 RX ADMIN — SODIUM CHLORIDE, PRESERVATIVE FREE 10 ML: 5 INJECTION INTRAVENOUS at 02:09

## 2020-08-16 RX ADMIN — Medication 1 EACH: at 06:45

## 2020-08-16 RX ADMIN — DEXTROSE MONOHYDRATE 15 MG/HR: 50 INJECTION, SOLUTION INTRAVENOUS at 00:17

## 2020-08-16 RX ADMIN — FERROUS SULFATE TAB 325 MG (65 MG ELEMENTAL FE) 325 MG: 325 (65 FE) TAB at 09:00

## 2020-08-16 RX ADMIN — DIGOXIN 250 MCG: 0.25 INJECTION INTRAMUSCULAR; INTRAVENOUS at 02:09

## 2020-08-16 RX ADMIN — PANTOPRAZOLE SODIUM 20 MG: 20 TABLET, DELAYED RELEASE ORAL at 06:45

## 2020-08-16 RX ADMIN — INSULIN LISPRO 12 UNITS: 100 INJECTION, SOLUTION INTRAVENOUS; SUBCUTANEOUS at 11:41

## 2020-08-16 ASSESSMENT — PAIN SCALES - GENERAL
PAINLEVEL_OUTOF10: 0
PAINLEVEL_OUTOF10: 2
PAINLEVEL_OUTOF10: 7
PAINLEVEL_OUTOF10: 0
PAINLEVEL_OUTOF10: 0

## 2020-08-16 NOTE — PROGRESS NOTES
Hospitalist Progress Note      Synopsis: Patient admitted on 8/14/2020 with acute hypoxic respiratory failure 2/2 acute on chronic CHF exacerbation. Getting diuresed. Also started on empiric coverage for possible pneumonia. Insulin regimen increased for glycemic control. Subjective    Pt reports breathing has informed  Discussed increasing her insulin regimen     Exam:  /63   Pulse 85   Temp 97.5 °F (36.4 °C)   Resp 18   Ht 5' 3\" (1.6 m)   Wt (!) 306 lb 9.6 oz (139.1 kg)   LMP  (LMP Unknown)   SpO2 97%   BMI 54.31 kg/m²   General appearance: No apparent distress, appears stated age and cooperative. HEENT: Pupils equal, round, and reactive to light. Conjunctivae/corneas clear. Neck: +JVD  Respiratory:  Normal respiratory effort. Clear to auscultation, bilaterally without Rales/Wheezes/Rhonchi. Cardiovascular: Regular rate and rhythm with normal S1/S2 without murmurs, rubs or gallops. Abdomen: Soft, non-tender, non-distended with normal bowel sounds.   Musculoskeletal: Edema in b/l lower extremities   Skin:  No rashes    Neurologic: awake, alert and following commands     Medications:  Reviewed    Infusion Medications    dextrose      diltiazem (CARDIZEM) 100 mg in dextrose 5% 100 mL (ADD-Homer) 5 mg/hr (08/16/20 1613)     Scheduled Medications    insulin glargine  15 Units Subcutaneous BID    insulin lispro  0-18 Units Subcutaneous TID WC    insulin lispro  0-9 Units Subcutaneous Nightly    sodium chloride flush  10 mL Intravenous 2 times per day    spironolactone  25 mg Oral Daily    apixaban  5 mg Oral BID    escitalopram  20 mg Oral Daily    ferrous sulfate  325 mg Oral Daily with breakfast    gabapentin  300 mg Oral TID    insulin regular human  45 Units Subcutaneous TID AC    montelukast  10 mg Oral Nightly    pantoprazole  20 mg Oral QAM AC    doxycycline hyclate  100 mg Oral 2 times per day    cefTRIAXone (ROCEPHIN) IV  2 g Intravenous Q24H    Insulin Pen Needle  1 each Does not apply TID AC    bumetanide  2 mg Intravenous BID    dilTIAZem  240 mg Oral Daily    metoprolol succinate  100 mg Oral Daily     PRN Meds: sodium chloride flush, acetaminophen **OR** acetaminophen, polyethylene glycol, promethazine **OR** ondansetron, ipratropium-albuterol, glucose, dextrose, glucagon (rDNA), dextrose, perflutren lipid microspheres    I/O    Intake/Output Summary (Last 24 hours) at 8/16/2020 1645  Last data filed at 8/16/2020 1415  Gross per 24 hour   Intake 1000 ml   Output 1450 ml   Net -450 ml       Labs:   Recent Labs     08/14/20 2046   WBC 7.2   HGB 9.0*   HCT 29.4*          Recent Labs     08/14/20 2046 08/16/20  1132    136   K 4.5 4.7   CL 93* 94*   CO2 33* 31*   BUN 16 23   CREATININE 0.7 0.7   CALCIUM 9.7 10.0       No results for input(s): PROT, ALB, ALKPHOS, ALT, AST, BILITOT, AMYLASE, LIPASE in the last 72 hours. No results for input(s): INR in the last 72 hours. Recent Labs     08/14/20 2046   TROPONINI <0.01       Chronic labs:  Lab Results   Component Value Date    CHOL 287 (H) 05/26/2020    TRIG 512 (H) 05/26/2020    HDL 33 05/26/2020    LDLCALC - (AA) 05/26/2020    TSH 2.240 05/26/2020    INR 1.1 05/31/2020    LABA1C 17.1 (H) 05/26/2020       Radiology:  Imaging studies reviewed today.     ASSESSMENT:  Acute on chronic hypoxic respiratory failure  Acute on chronic combined HF  Possible pneumonia   DM2  Paroxysmal atrial fibrillation  Pulmonary hypertension     PLAN:  Continue diuresis, appreciate cardiology input  Wean oxygen as tolerated  Low suspicion for pneumonia; obtain procalcitonin, if negative dc antibiotics  ECHO pending  Glycemic control  Strict I/O  Monitor renal function     Diet: DIET CARDIAC; Carb Control: 4 carb choices (60 gms)/meal  Code Status: Full Code  PT/OT Eval Status:   As needed  DVT Prophylaxis:   eliquis  Recommended disposition at discharge:  pending medical progression +++++++++++++++++++++++++++++++++++++++++++++++++  Christianne Arce MD   [facility].  +++++++++++++++++++++++++++++++++++++++++++++++++  NOTE: This report was transcribed using voice recognition software. Every effort was made to ensure accuracy; however, inadvertent computerized transcription errors may be present.

## 2020-08-16 NOTE — PLAN OF CARE
Problem: Pain:  Goal: Pain level will decrease  Description: Pain level will decrease  8/16/2020 1012 by Heather Goss RN  Outcome: Met This Shift     Problem: Pain:  Goal: Control of acute pain  Description: Control of acute pain  8/16/2020 1012 by Heather Goss RN  Outcome: Met This Shift     Problem: Pain:  Goal: Control of chronic pain  Description: Control of chronic pain  8/16/2020 1012 by Heather Goss RN  Outcome: Met This Shift

## 2020-08-16 NOTE — PROGRESS NOTES
PROGRESS NOTE     CARDIOLOGY    Chief complaint: Seen today for follow up, management & recommendations for shortness of breath, hypervolemia. She denies chest pain or shortness of breath today. She was sitting in a chair at the side of the bed. She was comfortable and in no distress. Wt Readings from Last 3 Encounters:   08/16/20 (!) 306 lb 9.6 oz (139.1 kg)   07/06/20 268 lb (121.6 kg)   05/28/20 250 lb (113.4 kg)     Temp Readings from Last 3 Encounters:   08/16/20 97.3 °F (36.3 °C)   06/02/20 97.5 °F (36.4 °C) (Temporal)   05/26/20 97.4 °F (36.3 °C) (Temporal)     BP Readings from Last 3 Encounters:   08/16/20 120/60   07/06/20 132/74   06/02/20 (!) 101/56     Pulse Readings from Last 3 Encounters:   08/16/20 102   07/06/20 79   06/02/20 89         Intake/Output Summary (Last 24 hours) at 8/16/2020 1157  Last data filed at 8/16/2020 0556  Gross per 24 hour   Intake 520 ml   Output 1150 ml   Net -630 ml       Recent Labs     08/14/20 2046   WBC 7.2   HGB 9.0*   HCT 29.4*   MCV 83.3        Recent Labs     08/14/20 2046      K 4.5   CL 93*   CO2 33*   BUN 16   CREATININE 0.7     No results for input(s): PROTIME, INR in the last 72 hours. Recent Labs     08/14/20 2046   TROPONINI <0.01     No results for input(s): BNP in the last 72 hours. No results for input(s): CHOL, HDL, TRIG in the last 72 hours.     Invalid input(s): CHOLHDLR, LDLCALCU      insulin glargine (LANTUS) injection vial 15 Units, BID  insulin lispro (HUMALOG) injection vial 0-18 Units, TID WC  insulin lispro (HUMALOG) injection vial 0-9 Units, Nightly  sodium chloride flush 0.9 % injection 10 mL, 2 times per day  sodium chloride flush 0.9 % injection 10 mL, PRN  acetaminophen (TYLENOL) tablet 650 mg, Q6H PRN    Or  acetaminophen (TYLENOL) suppository 650 mg, Q6H PRN  polyethylene glycol (GLYCOLAX) packet 17 g, Daily PRN  promethazine (PHENERGAN) tablet 12.5 mg, Q6H PRN    Or  ondansetron (ZOFRAN) injection 4 mg, Q6H PRN  spironolactone (ALDACTONE) tablet 25 mg, Daily  apixaban (ELIQUIS) tablet 5 mg, BID  escitalopram (LEXAPRO) tablet 20 mg, Daily  ferrous sulfate (IRON 325) tablet 325 mg, Daily with breakfast  gabapentin (NEURONTIN) capsule 300 mg, TID  insulin regular human (humuLIN R U-500 KWIKPEN) 500 UNIT/ML concentrated injection pen 45 Units, TID AC  ipratropium-albuterol (DUONEB) nebulizer solution 1 ampule, Q4H PRN  montelukast (SINGULAIR) tablet 10 mg, Nightly  pantoprazole (PROTONIX) tablet 20 mg, QAM AC  doxycycline hyclate (VIBRAMYCIN) capsule 100 mg, 2 times per day  cefTRIAXone (ROCEPHIN) 2 g in sterile water 20 mL IV syringe, Q24H  glucose (GLUTOSE) 40 % oral gel 15 g, PRN  dextrose 50 % IV solution, PRN  glucagon (rDNA) injection 1 mg, PRN  dextrose 5 % solution, PRN  Insulin Pen Needle MISC 1 each, TID AC  perflutren lipid microspheres (DEFINITY) injection 1.65 mg, ONCE PRN  bumetanide (BUMEX) injection 2 mg, BID  dilTIAZem (CARDIZEM CD) extended release capsule 240 mg, Daily  metoprolol succinate (TOPROL XL) extended release tablet 100 mg, Daily  dilTIAZem 100 mg in dextrose 5 % 100 mL infusion (ADD-Recluse), Continuous        Review of systems:     Heart: as above   Lungs: as above   Eyes: denies changes in vision or discharge. Ears: denies changes in hearing or pain. Nose: denies epistaxis or masses   Throat: denies sore throat or trouble swallowing. Neuro: denies numbness, tingling, tremors. Skin: denies rashes or itching. : denies hematuria, dysuria   GI: denies vomiting, diarrhea   Psych: denies mood changed, anxiety, depression. Physical exam:    Constitutional: A&O x3, communicates well, no acute distress. Eyes: extraocular muscles intact, PERRL. Normal lids & conjunctiva. No icterus. ENT: clear, no bleeding. No external masses. Lips normal formation. Neck: supple, full ROM, + JVD, no bruits, no lymphadenopathy. No masses. trachea midline.   Heart: regular rate & rhythm,

## 2020-08-17 LAB
ANION GAP SERPL CALCULATED.3IONS-SCNC: 13 MMOL/L (ref 7–16)
BUN BLDV-MCNC: 28 MG/DL (ref 8–23)
CALCIUM SERPL-MCNC: 9.5 MG/DL (ref 8.6–10.2)
CHLORIDE BLD-SCNC: 92 MMOL/L (ref 98–107)
CO2: 32 MMOL/L (ref 22–29)
CREAT SERPL-MCNC: 0.8 MG/DL (ref 0.5–1)
GFR AFRICAN AMERICAN: >60
GFR NON-AFRICAN AMERICAN: >60 ML/MIN/1.73
GLUCOSE BLD-MCNC: 221 MG/DL (ref 74–99)
MAGNESIUM: 1.9 MG/DL (ref 1.6–2.6)
METER GLUCOSE: 154 MG/DL (ref 74–99)
METER GLUCOSE: 164 MG/DL (ref 74–99)
METER GLUCOSE: 183 MG/DL (ref 74–99)
METER GLUCOSE: 227 MG/DL (ref 74–99)
METER GLUCOSE: 236 MG/DL (ref 74–99)
POTASSIUM REFLEX MAGNESIUM: 4.1 MMOL/L (ref 3.5–5)
SODIUM BLD-SCNC: 137 MMOL/L (ref 132–146)

## 2020-08-17 PROCEDURE — 6370000000 HC RX 637 (ALT 250 FOR IP): Performed by: INTERNAL MEDICINE

## 2020-08-17 PROCEDURE — 36415 COLL VENOUS BLD VENIPUNCTURE: CPT

## 2020-08-17 PROCEDURE — 2700000000 HC OXYGEN THERAPY PER DAY

## 2020-08-17 PROCEDURE — 97530 THERAPEUTIC ACTIVITIES: CPT

## 2020-08-17 PROCEDURE — 6360000002 HC RX W HCPCS: Performed by: EMERGENCY MEDICINE

## 2020-08-17 PROCEDURE — 2140000000 HC CCU INTERMEDIATE R&B

## 2020-08-17 PROCEDURE — 97165 OT EVAL LOW COMPLEX 30 MIN: CPT

## 2020-08-17 PROCEDURE — 82962 GLUCOSE BLOOD TEST: CPT

## 2020-08-17 PROCEDURE — 2500000003 HC RX 250 WO HCPCS: Performed by: INTERNAL MEDICINE

## 2020-08-17 PROCEDURE — 6370000000 HC RX 637 (ALT 250 FOR IP): Performed by: EMERGENCY MEDICINE

## 2020-08-17 PROCEDURE — 99232 SBSQ HOSP IP/OBS MODERATE 35: CPT | Performed by: INTERNAL MEDICINE

## 2020-08-17 PROCEDURE — 2580000003 HC RX 258: Performed by: EMERGENCY MEDICINE

## 2020-08-17 PROCEDURE — 83735 ASSAY OF MAGNESIUM: CPT

## 2020-08-17 PROCEDURE — 80048 BASIC METABOLIC PNL TOTAL CA: CPT

## 2020-08-17 RX ORDER — INSULIN GLARGINE 100 [IU]/ML
20 INJECTION, SOLUTION SUBCUTANEOUS 2 TIMES DAILY
Status: DISCONTINUED | OUTPATIENT
Start: 2020-08-17 | End: 2020-08-19

## 2020-08-17 RX ORDER — IBUPROFEN 800 MG/1
400 TABLET ORAL EVERY 6 HOURS PRN
Status: DISCONTINUED | OUTPATIENT
Start: 2020-08-17 | End: 2020-08-20 | Stop reason: HOSPADM

## 2020-08-17 RX ADMIN — INSULIN HUMAN 45 UNITS: 500 INJECTION, SOLUTION SUBCUTANEOUS at 12:55

## 2020-08-17 RX ADMIN — SODIUM CHLORIDE, PRESERVATIVE FREE 10 ML: 5 INJECTION INTRAVENOUS at 06:54

## 2020-08-17 RX ADMIN — CEFTRIAXONE 2 G: 2 INJECTION, POWDER, FOR SOLUTION INTRAMUSCULAR; INTRAVENOUS at 06:54

## 2020-08-17 RX ADMIN — GABAPENTIN 300 MG: 300 CAPSULE ORAL at 20:34

## 2020-08-17 RX ADMIN — SPIRONOLACTONE 25 MG: 25 TABLET ORAL at 08:17

## 2020-08-17 RX ADMIN — BUMETANIDE 2 MG: 0.25 INJECTION INTRAMUSCULAR; INTRAVENOUS at 20:29

## 2020-08-17 RX ADMIN — ACETAMINOPHEN 650 MG: 325 TABLET, FILM COATED ORAL at 12:33

## 2020-08-17 RX ADMIN — INSULIN GLARGINE 20 UNITS: 100 INJECTION, SOLUTION SUBCUTANEOUS at 20:36

## 2020-08-17 RX ADMIN — BUMETANIDE 2 MG: 0.25 INJECTION INTRAMUSCULAR; INTRAVENOUS at 08:18

## 2020-08-17 RX ADMIN — INSULIN GLARGINE 15 UNITS: 100 INJECTION, SOLUTION SUBCUTANEOUS at 08:24

## 2020-08-17 RX ADMIN — Medication 10 ML: at 20:30

## 2020-08-17 RX ADMIN — DOXYCYCLINE HYCLATE 100 MG: 100 CAPSULE ORAL at 08:17

## 2020-08-17 RX ADMIN — INSULIN LISPRO 3 UNITS: 100 INJECTION, SOLUTION INTRAVENOUS; SUBCUTANEOUS at 17:52

## 2020-08-17 RX ADMIN — MONTELUKAST SODIUM 10 MG: 10 TABLET, FILM COATED ORAL at 22:20

## 2020-08-17 RX ADMIN — INSULIN HUMAN 45 UNITS: 500 INJECTION, SOLUTION SUBCUTANEOUS at 18:52

## 2020-08-17 RX ADMIN — ESCITALOPRAM 20 MG: 10 TABLET, FILM COATED ORAL at 08:18

## 2020-08-17 RX ADMIN — METOPROLOL SUCCINATE 100 MG: 100 TABLET, EXTENDED RELEASE ORAL at 08:18

## 2020-08-17 RX ADMIN — INSULIN HUMAN 45 UNITS: 500 INJECTION, SOLUTION SUBCUTANEOUS at 07:01

## 2020-08-17 RX ADMIN — ACETAMINOPHEN 650 MG: 325 TABLET, FILM COATED ORAL at 03:37

## 2020-08-17 RX ADMIN — INSULIN LISPRO 6 UNITS: 100 INJECTION, SOLUTION INTRAVENOUS; SUBCUTANEOUS at 07:28

## 2020-08-17 RX ADMIN — INSULIN LISPRO 6 UNITS: 100 INJECTION, SOLUTION INTRAVENOUS; SUBCUTANEOUS at 12:55

## 2020-08-17 RX ADMIN — Medication 1 EACH: at 07:02

## 2020-08-17 RX ADMIN — Medication 1 EACH: at 13:03

## 2020-08-17 RX ADMIN — INSULIN LISPRO 2 UNITS: 100 INJECTION, SOLUTION INTRAVENOUS; SUBCUTANEOUS at 20:35

## 2020-08-17 RX ADMIN — Medication 10 ML: at 08:18

## 2020-08-17 RX ADMIN — GABAPENTIN 300 MG: 300 CAPSULE ORAL at 12:56

## 2020-08-17 RX ADMIN — PANTOPRAZOLE SODIUM 20 MG: 20 TABLET, DELAYED RELEASE ORAL at 07:28

## 2020-08-17 RX ADMIN — Medication 1 EACH: at 18:51

## 2020-08-17 RX ADMIN — APIXABAN 5 MG: 5 TABLET, FILM COATED ORAL at 20:29

## 2020-08-17 RX ADMIN — FERROUS SULFATE TAB 325 MG (65 MG ELEMENTAL FE) 325 MG: 325 (65 FE) TAB at 08:19

## 2020-08-17 RX ADMIN — ACETAMINOPHEN 650 MG: 325 TABLET, FILM COATED ORAL at 20:29

## 2020-08-17 RX ADMIN — DILTIAZEM HYDROCHLORIDE 240 MG: 240 CAPSULE, EXTENDED RELEASE ORAL at 08:17

## 2020-08-17 RX ADMIN — APIXABAN 5 MG: 5 TABLET, FILM COATED ORAL at 08:18

## 2020-08-17 RX ADMIN — GABAPENTIN 300 MG: 300 CAPSULE ORAL at 08:21

## 2020-08-17 ASSESSMENT — PAIN SCALES - GENERAL
PAINLEVEL_OUTOF10: 0
PAINLEVEL_OUTOF10: 0
PAINLEVEL_OUTOF10: 5
PAINLEVEL_OUTOF10: 5
PAINLEVEL_OUTOF10: 0
PAINLEVEL_OUTOF10: 5
PAINLEVEL_OUTOF10: 4
PAINLEVEL_OUTOF10: 0
PAINLEVEL_OUTOF10: 0

## 2020-08-17 ASSESSMENT — PAIN DESCRIPTION - DESCRIPTORS
DESCRIPTORS: ACHING;DISCOMFORT;DULL
DESCRIPTORS: ACHING;DISCOMFORT

## 2020-08-17 ASSESSMENT — PAIN DESCRIPTION - PAIN TYPE
TYPE: ACUTE PAIN
TYPE: ACUTE PAIN

## 2020-08-17 ASSESSMENT — PAIN - FUNCTIONAL ASSESSMENT: PAIN_FUNCTIONAL_ASSESSMENT: ACTIVITIES ARE NOT PREVENTED

## 2020-08-17 ASSESSMENT — PAIN DESCRIPTION - FREQUENCY
FREQUENCY: INTERMITTENT
FREQUENCY: INTERMITTENT

## 2020-08-17 ASSESSMENT — PAIN DESCRIPTION - PROGRESSION
CLINICAL_PROGRESSION: NOT CHANGED
CLINICAL_PROGRESSION: NOT CHANGED

## 2020-08-17 ASSESSMENT — PAIN DESCRIPTION - ONSET: ONSET: ON-GOING

## 2020-08-17 ASSESSMENT — PAIN DESCRIPTION - ORIENTATION
ORIENTATION: RIGHT;LEFT
ORIENTATION: RIGHT;LEFT

## 2020-08-17 ASSESSMENT — PAIN DESCRIPTION - LOCATION
LOCATION: FOOT;LEG
LOCATION: FOOT;LEG

## 2020-08-17 NOTE — PROGRESS NOTES
eliquis  Recommended disposition at discharge:  home w hhc at dc     +++++++++++++++++++++++++++++++++++++++++++++++++  Angelia Mckeon MD   [facility].  +++++++++++++++++++++++++++++++++++++++++++++++++  NOTE: This report was transcribed using voice recognition software. Every effort was made to ensure accuracy; however, inadvertent computerized transcription errors may be present.

## 2020-08-17 NOTE — PROGRESS NOTES
PROGRESS NOTE     CARDIOLOGY    Chief complaint: Seen today for follow up, management & recommendations for shortness of breath, hypervolemia. She denies chest pain. She was sitting in a chair at the side of the bed again today. She was comfortable and in no distress. She states that her shortness of breath is still more than her baseline. Wt Readings from Last 3 Encounters:   08/17/20 (!) 303 lb 9.6 oz (137.7 kg)   07/06/20 268 lb (121.6 kg)   05/28/20 250 lb (113.4 kg)     Temp Readings from Last 3 Encounters:   08/17/20 98.2 °F (36.8 °C) (Temporal)   06/02/20 97.5 °F (36.4 °C) (Temporal)   05/26/20 97.4 °F (36.3 °C) (Temporal)     BP Readings from Last 3 Encounters:   08/17/20 (!) 140/73   07/06/20 132/74   06/02/20 (!) 101/56     Pulse Readings from Last 3 Encounters:   08/17/20 81   07/06/20 79   06/02/20 89         Intake/Output Summary (Last 24 hours) at 8/17/2020 1123  Last data filed at 8/17/2020 0831  Gross per 24 hour   Intake 1190.05 ml   Output 2800 ml   Net -1609.95 ml       Recent Labs     08/14/20 2046   WBC 7.2   HGB 9.0*   HCT 29.4*   MCV 83.3        Recent Labs     08/14/20  2046 08/16/20  1132 08/17/20  0627    136 137   K 4.5 4.7 4.1   CL 93* 94* 92*   CO2 33* 31* 32*   BUN 16 23 28*   CREATININE 0.7 0.7 0.8   MG  --  1.8 1.9     No results for input(s): PROTIME, INR in the last 72 hours. Recent Labs     08/14/20 2046   TROPONINI <0.01     No results for input(s): BNP in the last 72 hours. No results for input(s): CHOL, HDL, TRIG in the last 72 hours.     Invalid input(s): CHOLHDLR, LDLCALCU      insulin glargine (LANTUS) injection vial 15 Units, BID  insulin lispro (HUMALOG) injection vial 0-18 Units, TID WC  insulin lispro (HUMALOG) injection vial 0-9 Units, Nightly  sodium chloride flush 0.9 % injection 10 mL, 2 times per day  sodium chloride flush 0.9 % injection 10 mL, PRN  acetaminophen (TYLENOL) tablet 650 mg, Q6H PRN    Or  acetaminophen (TYLENOL) suppository 650 mg, Q6H PRN  polyethylene glycol (GLYCOLAX) packet 17 g, Daily PRN  promethazine (PHENERGAN) tablet 12.5 mg, Q6H PRN    Or  ondansetron (ZOFRAN) injection 4 mg, Q6H PRN  spironolactone (ALDACTONE) tablet 25 mg, Daily  apixaban (ELIQUIS) tablet 5 mg, BID  escitalopram (LEXAPRO) tablet 20 mg, Daily  ferrous sulfate (IRON 325) tablet 325 mg, Daily with breakfast  gabapentin (NEURONTIN) capsule 300 mg, TID  insulin regular human (humuLIN R U-500 KWIKPEN) 500 UNIT/ML concentrated injection pen 45 Units, TID AC  ipratropium-albuterol (DUONEB) nebulizer solution 1 ampule, Q4H PRN  montelukast (SINGULAIR) tablet 10 mg, Nightly  pantoprazole (PROTONIX) tablet 20 mg, QAM AC  glucose (GLUTOSE) 40 % oral gel 15 g, PRN  dextrose 50 % IV solution, PRN  glucagon (rDNA) injection 1 mg, PRN  dextrose 5 % solution, PRN  Insulin Pen Needle MISC 1 each, TID AC  perflutren lipid microspheres (DEFINITY) injection 1.65 mg, ONCE PRN  bumetanide (BUMEX) injection 2 mg, BID  dilTIAZem (CARDIZEM CD) extended release capsule 240 mg, Daily  metoprolol succinate (TOPROL XL) extended release tablet 100 mg, Daily        Review of systems:     Heart: as above   Lungs: as above   Eyes: denies changes in vision or discharge. Ears: denies changes in hearing or pain. Nose: denies epistaxis or masses   Throat: denies sore throat or trouble swallowing. Neuro: denies numbness, tingling, tremors. Skin: denies rashes or itching. : denies hematuria, dysuria   GI: denies vomiting, diarrhea   Psych: denies mood changed, anxiety, depression. Physical exam:    Constitutional: A&O x3, communicates well, no acute distress. Eyes: extraocular muscles intact, PERRL. Normal lids & conjunctiva. No icterus. ENT: clear, no bleeding. No external masses. Lips normal formation. Neck: supple, full ROM, very difficult to examine but + JVD, no bruits, no lymphadenopathy. No masses. trachea midline.   Heart: regular rate & rhythm, normal S1 & S2, I/VI (normal physiologic) systolic murmur, S4 gallop. No heave. Lungs: Extremely poor air movement. CTA. No accessory muscles. Abd: Exogenously obese. Soft, non-tender. Normal bowel sounds. Neuro: Full ROM X 4, EOMI, no tremors. EXT: severe bilateral lower extremity edema  Skin: warm, dry, intact. Good turgor. Psych: A&O x 3, normal behavior, not anxious. Assessment/Recommendations  1. Very difficult exam but hypervolemia. Diuresing. Continue the IV diuresis and continue to follow lites and creatinine. 2. Known stage II diastolic dysfunction. 3. Paroxysmal atrial fibrillation. She went back into atrial fibrillation last evening but is back in sinus rhythm again today. I agree with the IV Cardizem. Switching from IV to oral today. On Eliquis therapy. 4. Hypertension  5. LVH  6. Severe oxygen requiring COPD. 7. Chronic lymphedema but severe hypervolemia on top of this. 8. PFO  9. Super morbid obesity  10. Pulmonary hypertension  11. Anemia  12. Severe/uncontrolled diabetes.

## 2020-08-17 NOTE — CARE COORDINATION
Transition of care: Met with pt in room. Pt lives with her  and 2 grandsons (ages 18+19) in a 2 story home. Pt stays on the 1st floor. Sleeps in a recliner in the living room. Independent with ADLs. Family drives for her. DME- BSC, rollator, wheelchair, standard walker, oxygen at 4l/nc from [facility], nebulizer and glucometer. No history of leesa or hhc. PT/OT to see -await evals. I provided pt with hhc list and leesa list for [facility]. PCP is Dr. Shane López and pharmacy is [facility] on [address]. Sw/cm will follow   The Plan for Transition of Care is related to the following treatment goals:hhc or leesa   The Patient was provided with a choice of provider and agrees   with the discharge plan. [x] Yes [] No  Freedom of choice list was provided with basic dialogue that supports the patient's individualized plan of care/goals, treatment preferences and shares the quality data associated with the providers.  [x] Yes [] No

## 2020-08-17 NOTE — PROGRESS NOTES
ADVANCED CARE PLANNING    Patient Name: Nando Santos       YOB: 1955              MRN:    [id number]  Admission Date:  8/14/2020  8:12 PM    Active Diagnoses: Active Problems:    Acute on chronic respiratory failure with hypoxia (HCC)    CHF (congestive heart failure), NYHA class I, acute on chronic, combined ([facility] [address]  Resolved Problems:    * No resolved hospital problems. *      These active diagnoses are of sufficient risk that focused discussion on advanced care planning is indicated in order to allow the patient to thoughtfully consider personal goals of care; and, if situations arise that prevent the patient to personally give input, to ensure appropriate representation of their personal desire for different levels and levels of care. Persons present in discussion: Debbie Garcia MD    Discussion: I reviewed her admission for respiratory failure 2/2 CHF and her desires for ongoing aggressive care, including potential intubation and mechanical ventilation; also discussed who would speak on her behalf should she be unable to do so (her ) and discussed what conversations she has had with her family so they understand her desires if such a situation occurred now or in the future. I presented and explained the availability of our palliative care team to her, including the availability of advanced directives forms which she can review with her family and then fill out if they so wish. Time Spent on Advanced Planning Documents: 16 minutes    Electronically signed by Mariluz Peterson MD on 8/17/2020 at 1:00 PM    NOTE: This report was transcribed using voice recognition software. Every effort was made to ensure accuracy; however, inadvertent computerized transcription errors may be present.

## 2020-08-17 NOTE — PLAN OF CARE
Problem: FLUID AND ELECTROLYTE IMBALANCE  Goal: Fluid and electrolyte balance are achieved/maintained  Outcome: Ongoing     Problem: ACTIVITY INTOLERANCE/IMPAIRED MOBILITY  Goal: Mobility/activity is maintained at optimum level for patient  Outcome: Ongoing

## 2020-08-18 ENCOUNTER — APPOINTMENT (OUTPATIENT)
Dept: ULTRASOUND IMAGING | Age: 65
DRG: 194 | End: 2020-08-18
Payer: COMMERCIAL

## 2020-08-18 LAB
ANION GAP SERPL CALCULATED.3IONS-SCNC: 13 MMOL/L (ref 7–16)
BUN BLDV-MCNC: 29 MG/DL (ref 8–23)
CALCIUM SERPL-MCNC: 9.6 MG/DL (ref 8.6–10.2)
CHLORIDE BLD-SCNC: 90 MMOL/L (ref 98–107)
CO2: 35 MMOL/L (ref 22–29)
CREAT SERPL-MCNC: 0.9 MG/DL (ref 0.5–1)
GFR AFRICAN AMERICAN: >60
GFR NON-AFRICAN AMERICAN: >60 ML/MIN/1.73
GLUCOSE BLD-MCNC: 157 MG/DL (ref 74–99)
METER GLUCOSE: 201 MG/DL (ref 74–99)
METER GLUCOSE: 207 MG/DL (ref 74–99)
METER GLUCOSE: 207 MG/DL (ref 74–99)
METER GLUCOSE: 277 MG/DL (ref 74–99)
POTASSIUM REFLEX MAGNESIUM: 4.3 MMOL/L (ref 3.5–5)
SODIUM BLD-SCNC: 138 MMOL/L (ref 132–146)

## 2020-08-18 PROCEDURE — 2700000000 HC OXYGEN THERAPY PER DAY

## 2020-08-18 PROCEDURE — 2140000000 HC CCU INTERMEDIATE R&B

## 2020-08-18 PROCEDURE — 6370000000 HC RX 637 (ALT 250 FOR IP): Performed by: INTERNAL MEDICINE

## 2020-08-18 PROCEDURE — 97530 THERAPEUTIC ACTIVITIES: CPT

## 2020-08-18 PROCEDURE — 82962 GLUCOSE BLOOD TEST: CPT

## 2020-08-18 PROCEDURE — 2500000003 HC RX 250 WO HCPCS: Performed by: INTERNAL MEDICINE

## 2020-08-18 PROCEDURE — 2580000003 HC RX 258: Performed by: EMERGENCY MEDICINE

## 2020-08-18 PROCEDURE — 80048 BASIC METABOLIC PNL TOTAL CA: CPT

## 2020-08-18 PROCEDURE — 93970 EXTREMITY STUDY: CPT

## 2020-08-18 PROCEDURE — 97161 PT EVAL LOW COMPLEX 20 MIN: CPT

## 2020-08-18 PROCEDURE — 36415 COLL VENOUS BLD VENIPUNCTURE: CPT

## 2020-08-18 PROCEDURE — 6370000000 HC RX 637 (ALT 250 FOR IP): Performed by: EMERGENCY MEDICINE

## 2020-08-18 PROCEDURE — 99232 SBSQ HOSP IP/OBS MODERATE 35: CPT | Performed by: INTERNAL MEDICINE

## 2020-08-18 RX ORDER — HYDROCODONE BITARTRATE AND ACETAMINOPHEN 5; 325 MG/1; MG/1
1 TABLET ORAL EVERY 6 HOURS PRN
Status: DISCONTINUED | OUTPATIENT
Start: 2020-08-18 | End: 2020-08-20 | Stop reason: HOSPADM

## 2020-08-18 RX ORDER — BUMETANIDE 0.25 MG/ML
2 INJECTION, SOLUTION INTRAMUSCULAR; INTRAVENOUS 3 TIMES DAILY
Status: COMPLETED | OUTPATIENT
Start: 2020-08-18 | End: 2020-08-19

## 2020-08-18 RX ADMIN — GABAPENTIN 300 MG: 300 CAPSULE ORAL at 19:45

## 2020-08-18 RX ADMIN — HYDROCODONE BITARTRATE AND ACETAMINOPHEN 1 TABLET: 5; 325 TABLET ORAL at 23:38

## 2020-08-18 RX ADMIN — APIXABAN 5 MG: 5 TABLET, FILM COATED ORAL at 09:00

## 2020-08-18 RX ADMIN — BUMETANIDE 2 MG: 0.25 INJECTION INTRAMUSCULAR; INTRAVENOUS at 13:52

## 2020-08-18 RX ADMIN — ESCITALOPRAM 20 MG: 10 TABLET, FILM COATED ORAL at 09:00

## 2020-08-18 RX ADMIN — IBUPROFEN 400 MG: 800 TABLET ORAL at 19:43

## 2020-08-18 RX ADMIN — Medication 10 ML: at 19:43

## 2020-08-18 RX ADMIN — Medication 10 ML: at 09:01

## 2020-08-18 RX ADMIN — INSULIN LISPRO 9 UNITS: 100 INJECTION, SOLUTION INTRAVENOUS; SUBCUTANEOUS at 12:00

## 2020-08-18 RX ADMIN — INSULIN HUMAN 45 UNITS: 500 INJECTION, SOLUTION SUBCUTANEOUS at 17:14

## 2020-08-18 RX ADMIN — IBUPROFEN 400 MG: 800 TABLET ORAL at 01:20

## 2020-08-18 RX ADMIN — MONTELUKAST SODIUM 10 MG: 10 TABLET, FILM COATED ORAL at 19:42

## 2020-08-18 RX ADMIN — INSULIN HUMAN 45 UNITS: 500 INJECTION, SOLUTION SUBCUTANEOUS at 07:47

## 2020-08-18 RX ADMIN — Medication 1 EACH: at 12:00

## 2020-08-18 RX ADMIN — SPIRONOLACTONE 25 MG: 25 TABLET ORAL at 09:00

## 2020-08-18 RX ADMIN — DILTIAZEM HYDROCHLORIDE 240 MG: 240 CAPSULE, EXTENDED RELEASE ORAL at 09:00

## 2020-08-18 RX ADMIN — FERROUS SULFATE TAB 325 MG (65 MG ELEMENTAL FE) 325 MG: 325 (65 FE) TAB at 09:00

## 2020-08-18 RX ADMIN — INSULIN LISPRO 3 UNITS: 100 INJECTION, SOLUTION INTRAVENOUS; SUBCUTANEOUS at 19:45

## 2020-08-18 RX ADMIN — Medication 1 EACH: at 17:13

## 2020-08-18 RX ADMIN — INSULIN GLARGINE 20 UNITS: 100 INJECTION, SOLUTION SUBCUTANEOUS at 19:46

## 2020-08-18 RX ADMIN — GABAPENTIN 300 MG: 300 CAPSULE ORAL at 13:52

## 2020-08-18 RX ADMIN — INSULIN LISPRO 6 UNITS: 100 INJECTION, SOLUTION INTRAVENOUS; SUBCUTANEOUS at 07:43

## 2020-08-18 RX ADMIN — GABAPENTIN 300 MG: 300 CAPSULE ORAL at 09:00

## 2020-08-18 RX ADMIN — ACETAMINOPHEN 650 MG: 325 TABLET, FILM COATED ORAL at 19:43

## 2020-08-18 RX ADMIN — BUMETANIDE 2 MG: 0.25 INJECTION INTRAMUSCULAR; INTRAVENOUS at 19:43

## 2020-08-18 RX ADMIN — APIXABAN 5 MG: 5 TABLET, FILM COATED ORAL at 19:44

## 2020-08-18 RX ADMIN — INSULIN LISPRO 6 UNITS: 100 INJECTION, SOLUTION INTRAVENOUS; SUBCUTANEOUS at 17:14

## 2020-08-18 RX ADMIN — Medication 1 EACH: at 07:51

## 2020-08-18 RX ADMIN — BUMETANIDE 2 MG: 0.25 INJECTION INTRAMUSCULAR; INTRAVENOUS at 08:59

## 2020-08-18 RX ADMIN — ACETAMINOPHEN 650 MG: 325 TABLET, FILM COATED ORAL at 13:51

## 2020-08-18 RX ADMIN — INSULIN GLARGINE 20 UNITS: 100 INJECTION, SOLUTION SUBCUTANEOUS at 09:00

## 2020-08-18 RX ADMIN — METOPROLOL SUCCINATE 100 MG: 100 TABLET, EXTENDED RELEASE ORAL at 09:00

## 2020-08-18 RX ADMIN — PANTOPRAZOLE SODIUM 20 MG: 20 TABLET, DELAYED RELEASE ORAL at 06:43

## 2020-08-18 RX ADMIN — INSULIN HUMAN 45 UNITS: 500 INJECTION, SOLUTION SUBCUTANEOUS at 11:59

## 2020-08-18 ASSESSMENT — PAIN DESCRIPTION - ORIENTATION
ORIENTATION: RIGHT;LEFT
ORIENTATION: RIGHT
ORIENTATION: RIGHT;LEFT

## 2020-08-18 ASSESSMENT — PAIN DESCRIPTION - ONSET
ONSET: ON-GOING
ONSET: ON-GOING

## 2020-08-18 ASSESSMENT — PAIN DESCRIPTION - LOCATION
LOCATION: LEG
LOCATION: FOOT;LEG
LOCATION: LEG

## 2020-08-18 ASSESSMENT — PAIN SCALES - GENERAL
PAINLEVEL_OUTOF10: 0
PAINLEVEL_OUTOF10: 6
PAINLEVEL_OUTOF10: 6
PAINLEVEL_OUTOF10: 0
PAINLEVEL_OUTOF10: 8
PAINLEVEL_OUTOF10: 7
PAINLEVEL_OUTOF10: 7

## 2020-08-18 ASSESSMENT — PAIN DESCRIPTION - DESCRIPTORS
DESCRIPTORS: ACHING;DISCOMFORT
DESCRIPTORS: CONSTANT
DESCRIPTORS: ACHING;CONSTANT;DISCOMFORT

## 2020-08-18 ASSESSMENT — PAIN DESCRIPTION - PAIN TYPE
TYPE: ACUTE PAIN
TYPE: CHRONIC PAIN
TYPE: CHRONIC PAIN

## 2020-08-18 ASSESSMENT — PAIN DESCRIPTION - FREQUENCY
FREQUENCY: INTERMITTENT
FREQUENCY: CONTINUOUS
FREQUENCY: CONTINUOUS

## 2020-08-18 ASSESSMENT — PAIN DESCRIPTION - PROGRESSION
CLINICAL_PROGRESSION: NOT CHANGED
CLINICAL_PROGRESSION: NOT CHANGED

## 2020-08-18 NOTE — PLAN OF CARE
8/18/2020- Pt admitted with acute resp failure, PFIB, stage II DD, chronic 02 dependent. I provided her with the following Heart Failure educational material,  which included:        *The Heart Failure book- \"Caring for Your Heart: Living Well with Heart Failure\"      *The Heart Failure Zones       *The Sodium Content pamphlet      *\"What Foods Should You Avoid? \" information sheet. *Fast Food Sodium information sheet       *Digital scale for daily weights         We reviewed the introduction to Heart Failure, the HF zones, signs and symptoms to report on day 1 of onset, medications, medication compliance, the importance of obtaining daily weights, following a low sodium diet, reading food labels for the sodium content, and keeping physician appointments. She quit smoking 15 years ago, but her  still smokes. He smokes in the kitchen with the window open. We discussed writing / tracking her weights on a calendar / paper because 5# in 1 week can sneak up if you are not tracking it. She can also take her charted weights to her doctor appointments to be reviewed. I advised patient she can reduce the risk for Heart Failure exacerbations by modifying / controlling the risk factors she has. We discussed self-managed care which includes the following:  to take her medications as prescribed, if she experiences any intolerable side effects to any medications to please call her cardiologist / doctor, do not just stop taking the medication; follow a cardiac heart healthy / low sodium diet; weigh herself daily, and exercise regularly- per doctor recommendation. We discussed calling her cardiologist / doctor with a weight gain of 3# in one day or a total of 5# or more in one week. Also, if she would have a significant weight loss of 3# or more in one day to call her doctor, she may have to decrease or hold her diuretic dose.  On days she feels nauseated and not eating / drinking, having emesis or Daily    apixaban  5 mg Oral BID    escitalopram  20 mg Oral Daily    ferrous sulfate  325 mg Oral Daily with breakfast    gabapentin  300 mg Oral TID    insulin regular human  45 Units Subcutaneous TID AC    montelukast  10 mg Oral Nightly    pantoprazole  20 mg Oral QAM AC    Insulin Pen Needle  1 each Does not apply TID AC    dilTIAZem  240 mg Oral Daily    metoprolol succinate  100 mg Oral Daily      dextrose         Code Status:Full Code    The patient is ordered:  Diet: DIET CARDIAC; Carb Control: 4 carb choices (60 gms)/meal   Sodium/fluid restriction daily ordered (fluid restriction recommended if patient is hyponatremic and/or diuretic is initiated or increased):  [] Yes     [] No  FR:   Daily Weights:   Patient Vitals for the past 96 hrs (Last 3 readings):   Weight   08/18/20 0650 298 lb 8 oz (135.4 kg)   08/17/20 0414 (!) 303 lb 9.6 oz (137.7 kg)   08/16/20 0556 (!) 306 lb 9.6 oz (139.1 kg)     I/O:     Intake/Output Summary (Last 24 hours) at 8/18/2020 1605  Last data filed at 8/18/2020 1427  Gross per 24 hour   Intake 480 ml   Output 3650 ml   Net -3170 ml        The Heart Failure Booklet was given to the patient, which addresses:  · What is Heart Failure?   · Things You Can Do to Live Well with HF  · How to Take Your Medications  · How to Eat Less Salt  · Exercising Well with Heart Failure  · Signs and symptoms of HF to report  · Weight Yourself Each Day  · Home Self Management- activity, weight tracking, taking medications as prescribed, meals /diet planning (sodium and fluid restriction), how to read food labels, keeping physician follow ups, smoking cessation, follow the Heart Failure Zones    Instructed  to call 911 if you have any of the following symptoms:    ·    Struggling to breathe unrelieved with rest,  ·    Having chest pain, confusion or can't think clearly  ·    Have confusion or cant think clearly    Readmission Risk Score: 22        Discharge Plan:  I placed the Heart Failure Home Instructions in her discharge instructions. Per Heart Failure GWTG, the patient should have a follow-up appointment made within 7 days of discharge. Alec MEDRANO, RN, CHFN    Heart Failure Navigator    CONGESTIVE HEART FAILURE (CHF) GUIDELINES  (Must be completed for Primary Diagnosis CHF or History of CHF)    Type of CHF:    [] Acute   [] Chronic     [] Acute on Chronic     Ventricular Function Assessment (check):             [x] HFpEF  [] HFpEF, borderline  [] HFpEF, improved  [] HFrEF  Ejection Fraction (%): Angiotensin-Converting-Enzyme (ACE) inhibitor ordered:  [] Yes  [x] No (specify contraindication):  [] Renal Insufficiency  [] Cough  [] Hypotension  [] Allergy/angioedema  [x] No left ventricular systolic dysfunction (LVSD)  [] Hyperkalemia  [] Moderate to severe aortic stenosis  [] Other (Specify):     Angiotensin II receptor blockers (ARB) ordered:  [] Yes  [x] No (specify contraindication):  [] Renal Insufficiency  [] Hypotension  [] Allergy/angioedema  [x] No LVSD  [] Hyperkalemia  [] Moderate to severe aortic stenosis  [] Other (Specify):      ARNI - Angiotensin Receptor Neprilysin Inhibitor ordered:  [] Yes  [] No      ACC/AHA Guidelines Beta Blocker (Carvedilol, Metoprolol Succinate, or Bisoprolol) ordered:    [x] Yes  [] No (specify contraindication):  [] Bradycardia  [] Hypotension  [] LVD  [] 2nd or 3rd degree heart block  [] Bronchospastic airway disease  [] Decompensated CHF  [] Other (Specify):

## 2020-08-18 NOTE — PROGRESS NOTES
Hospitalist Progress Note      Synopsis: Patient admitted on 8/14/2020 with acute hypoxic respiratory failure 2/2 acute on chronic CHF exacerbation. Getting diuresed. Also started on empiric coverage for possible pneumonia. Insulin regimen increased for glycemic control. Subjective    Pt reports breathing has improved  Discussed increasing her insulin regimen; pt does not seem interested in meeting with a dietician     Exam:  /63   Pulse 94   Temp 97.2 °F (36.2 °C) (Temporal)   Resp 16   Ht 5' 3\" (1.6 m)   Wt 298 lb 8 oz (135.4 kg)   LMP  (LMP Unknown)   SpO2 96%   BMI 52.88 kg/m²   General appearance: No apparent distress, appears stated age and cooperative. HEENT: Pupils equal, round, and reactive to light. Conjunctivae/corneas clear. Neck: +JVD  Respiratory:  Normal respiratory effort. Clear to auscultation, bilaterally without Rales/Wheezes/Rhonchi. Cardiovascular: Regular rate and rhythm with normal S1/S2 without murmurs, rubs or gallops. Abdomen: Soft, non-tender, non-distended with normal bowel sounds.   Musculoskeletal: Edema in b/l lower extremities   Skin:  No rashes    Neurologic: awake, alert and following commands     Medications:  Reviewed    Infusion Medications    dextrose       Scheduled Medications    bumetanide  2 mg Intravenous TID    insulin glargine  20 Units Subcutaneous BID    insulin lispro  0-18 Units Subcutaneous TID WC    insulin lispro  0-9 Units Subcutaneous Nightly    sodium chloride flush  10 mL Intravenous 2 times per day    spironolactone  25 mg Oral Daily    apixaban  5 mg Oral BID    escitalopram  20 mg Oral Daily    ferrous sulfate  325 mg Oral Daily with breakfast    gabapentin  300 mg Oral TID    insulin regular human  45 Units Subcutaneous TID AC    montelukast  10 mg Oral Nightly    pantoprazole  20 mg Oral QAM AC    Insulin Pen Needle  1 each Does not apply TID AC    dilTIAZem  240 mg Oral Daily    metoprolol succinate  100 mg Oral Daily     PRN Meds: ibuprofen, sodium chloride flush, acetaminophen **OR** acetaminophen, polyethylene glycol, promethazine **OR** ondansetron, ipratropium-albuterol, glucose, dextrose, glucagon (rDNA), dextrose, perflutren lipid microspheres    I/O    Intake/Output Summary (Last 24 hours) at 8/18/2020 0955  Last data filed at 8/18/2020 0650  Gross per 24 hour   Intake 840 ml   Output 4200 ml   Net -3360 ml       Labs:   No results for input(s): WBC, HGB, HCT, PLT in the last 72 hours. Recent Labs     08/16/20  1132 08/17/20  0627 08/18/20  0631    137 138   K 4.7 4.1 4.3   CL 94* 92* 90*   CO2 31* 32* 35*   BUN 23 28* 29*   CREATININE 0.7 0.8 0.9   CALCIUM 10.0 9.5 9.6       No results for input(s): PROT, ALB, ALKPHOS, ALT, AST, BILITOT, AMYLASE, LIPASE in the last 72 hours. No results for input(s): INR in the last 72 hours. No results for input(s): Terrell Blue in the last 72 hours. Chronic labs:  Lab Results   Component Value Date    CHOL 287 (H) 05/26/2020    TRIG 512 (H) 05/26/2020    HDL 33 05/26/2020    LDLCALC - (AA) 05/26/2020    TSH 2.240 05/26/2020    INR 1.1 05/31/2020    LABA1C 17.1 (H) 05/26/2020       Radiology:  Imaging studies reviewed today.     ASSESSMENT:  Acute on chronic hypoxic respiratory failure  Acute on chronic combined HF  DM2  Paroxysmal atrial fibrillation  Pulmonary hypertension  Chronic lymphedema      PLAN:  Cardiology following  HF nurse consult  Diltiazem 240 mg daily  Eliquis 5 mg BID  High dose correction insulin  Lantus 20 units BID  Bumex 2 mg TID   Spironolactone 25 mg daily  Strict I/O  Monitor renal function   PT/OT    Diet: DIET CARDIAC; Carb Control: 4 carb choices (60 gms)/meal  Code Status: Full Code  PT/OT Eval Status:   As needed  DVT Prophylaxis:   eliquis  Recommended disposition at discharge:  home w c at [place]     +++++++++++++++++++++++++++++++++++++++++++++++++  Corrie Lebanese, [address] [place].  +++++++++++++++++++++++++++++++++++++++++++++++++  NOTE: This report was transcribed using voice recognition software. Every effort was made to ensure accuracy; however, inadvertent computerized transcription errors may be present.

## 2020-08-18 NOTE — PROGRESS NOTES
[address] [facility] Keyla re: Pt is c/o pain in her legs and feet. States it is a 4/10 constant, aching pain. Will await orders.

## 2020-08-18 NOTE — PROGRESS NOTES
PROGRESS NOTE     CARDIOLOGY    Chief complaint: Seen today for follow up, management & recommendations for shortness of breath, hypervolemia. She denies chest pain. She was sitting in a chair at the side of the bed once again today. She was comfortable and in no distress. She states that she still has dyspnea with exertion. She has chronic dyspnea with exertion and it is difficult to tell if she is at her baseline. Wt Readings from Last 3 Encounters:   08/18/20 298 lb 8 oz (135.4 kg)   07/06/20 268 lb (121.6 kg)   05/28/20 250 lb (113.4 kg)     Temp Readings from Last 3 Encounters:   08/18/20 97.2 °F (36.2 °C) (Temporal)   06/02/20 97.5 °F (36.4 °C) (Temporal)   05/26/20 97.4 °F (36.3 °C) (Temporal)     BP Readings from Last 3 Encounters:   08/18/20 136/63   07/06/20 132/74   06/02/20 (!) 101/56     Pulse Readings from Last 3 Encounters:   08/18/20 94   07/06/20 79   06/02/20 89         Intake/Output Summary (Last 24 hours) at 8/18/2020 0912  Last data filed at 8/18/2020 0650  Gross per 24 hour   Intake 840 ml   Output 4700 ml   Net -3860 ml       No results for input(s): WBC, HGB, HCT, MCV, PLT in the last 72 hours. Recent Labs     08/16/20  1132 08/17/20  0627 08/18/20  0631    137 138   K 4.7 4.1 4.3   CL 94* 92* 90*   CO2 31* 32* 35*   BUN 23 28* 29*   CREATININE 0.7 0.8 0.9   MG 1.8 1.9  --      No results for input(s): PROTIME, INR in the last 72 hours. No results for input(s): CKTOTAL, CKMB, CKMBINDEX, TROPONINI in the last 72 hours. No results for input(s): BNP in the last 72 hours. No results for input(s): CHOL, HDL, TRIG in the last 72 hours.     Invalid input(s): CHOLHDLR, LDLCALCU      bumetanide (BUMEX) injection 2 mg, TID  insulin glargine (LANTUS) injection vial 20 Units, BID  ibuprofen (ADVIL;MOTRIN) tablet 400 mg, Q6H PRN  insulin lispro (HUMALOG) injection vial 0-18 Units, TID WC  insulin lispro (HUMALOG) injection vial 0-9 Units, Nightly  sodium chloride flush 0.9 % injection 10 mL, 2 times per day  sodium chloride flush 0.9 % injection 10 mL, PRN  acetaminophen (TYLENOL) tablet 650 mg, Q6H PRN    Or  acetaminophen (TYLENOL) suppository 650 mg, Q6H PRN  polyethylene glycol (GLYCOLAX) packet 17 g, Daily PRN  promethazine (PHENERGAN) tablet 12.5 mg, Q6H PRN    Or  ondansetron (ZOFRAN) injection 4 mg, Q6H PRN  spironolactone (ALDACTONE) tablet 25 mg, Daily  apixaban (ELIQUIS) tablet 5 mg, BID  escitalopram (LEXAPRO) tablet 20 mg, Daily  ferrous sulfate (IRON 325) tablet 325 mg, Daily with breakfast  gabapentin (NEURONTIN) capsule 300 mg, TID  insulin regular human (humuLIN R U-500 KWIKPEN) 500 UNIT/ML concentrated injection pen 45 Units, TID AC  ipratropium-albuterol (DUONEB) nebulizer solution 1 ampule, Q4H PRN  montelukast (SINGULAIR) tablet 10 mg, Nightly  pantoprazole (PROTONIX) tablet 20 mg, QAM AC  glucose (GLUTOSE) 40 % oral gel 15 g, PRN  dextrose 50 % IV solution, PRN  glucagon (rDNA) injection 1 mg, PRN  dextrose 5 % solution, PRN  Insulin Pen Needle MISC 1 each, TID AC  perflutren lipid microspheres (DEFINITY) injection 1.65 mg, ONCE PRN  dilTIAZem (CARDIZEM CD) extended release capsule 240 mg, Daily  metoprolol succinate (TOPROL XL) extended release tablet 100 mg, Daily        Review of systems:     Heart: as above   Lungs: as above   Eyes: denies changes in vision or discharge. Ears: denies changes in hearing or pain. Nose: denies epistaxis or masses   Throat: denies sore throat or trouble swallowing. Neuro: denies numbness, tingling, tremors. Skin: denies rashes or itching. : denies hematuria, dysuria   GI: denies vomiting, diarrhea   Psych: denies mood changed, anxiety, depression. Physical exam:    Constitutional: A&O x3, communicates well, no acute distress. Eyes: extraocular muscles intact, PERRL. Normal lids & conjunctiva. No icterus. ENT: clear, no bleeding. No external masses. Lips normal formation.   Neck: supple, full ROM, very difficult to examine but + JVD, no bruits, no lymphadenopathy. No masses. trachea midline. Heart: regular rate & rhythm, normal S1 & S2, I/VI (normal physiologic) systolic murmur, S4 gallop. No heave. Lungs: Extremely poor air movement. CTA. No accessory muscles. Abd: Exogenously obese. Soft, non-tender. Normal bowel sounds. Neuro: Full ROM X 4, EOMI, no tremors. EXT: severe bilateral lower extremity edema  Skin: warm, dry, intact. Good turgor. Psych: A&O x 3, normal behavior, not anxious. Assessment/Recommendations  1. Very difficult exam.  However, still hypervolemia. Diuresing. I will try to increase the IV diuresis and continue to follow lites and creatinine. 2. Known stage II diastolic dysfunction. 3. Paroxysmal atrial fibrillation. She went back into atrial fibrillation last evening but is back in sinus rhythm again today. I agree with the IV Cardizem. Switching from IV to oral today. On Eliquis therapy. 4. Hypertension  5. LVH  6. Severe oxygen requiring COPD. 7. Chronic lymphedema but severe hypervolemia on top of this. 8. PFO  9. Super morbid obesity  10. Pulmonary hypertension  11. Anemia  12. Severe/uncontrolled diabetes.

## 2020-08-18 NOTE — PROGRESS NOTES
Physical Therapy    Physical Therapy Initial Assessment     Name: Smita Whitt  : 1955  MRN: [id number]    Referring Provider:  Laureano Higuera MD     Date of Service: 2020    Evaluating PT:  Rah Rasmussen, PT, DPT    Room #:  4746/5340-W  Diagnosis:  Acute on chronic renal failure with hypoxia  PMHx/PSHx:  A-fib, Asthma, HTN, Hyperlipidemia, Arthritis, Lymphadenitis, GERD, HCF, COPD, Anxiety/depression, DM2  Procedure/Surgery:  NA  Precautions:  Falls, O2  Equipment Needs:  Has [address], [address]ator [address], WC, BSC    SUBJECTIVE:    Pt lives with her  in a 2 story home with ramp entry. Pt sleeps on the 1st floor in a recliner, no bathroom available and uses BSC. Pt reported having a stair glide to the 2nd floor but \"my knees hit the wall and I can't use it\". Pt ambulated with a [address] or [address] PTA in the home, WC in the community. Pt reported having assist as needed with ADLs (sponge bathing). Pt is not actively driving. Home O2. OBJECTIVE:   Initial Evaluation  Date: 2020 Treatment  NA Short Term/ Long Term   Goals   AM-PAC 6 Clicks 67/02     Was pt agreeable to Eval/treatment? Yes but reported being very tired. Does pt have pain? Reported pain in BLEs. Did not quantify. Bed Mobility  NT, patient in recliner chair. NA   Transfers Sit to stand: Supervision  Stand to sit: Supervision  Stand pivot: Supervision  Mod I   Ambulation    10 feet with standard walker with Supervision  >50 feet with WW with Mod I   Stair negotiation: ascended and descended  NT  NA   ROM BUE:  WFL  BLE:  Limited by edema     Strength BUE:  4/5  BLE:  RLE 3-/5, LLE 4/5  Increase 1/3 grade MMT   Balance Sitting EOB:  Supervision  Dynamic Standing:  Supervision with AD  Sitting EOB:  Independent  Dynamic Standing:   Mod I with AD     Pt is A & O x 4  Sensation:  Pt reported numbness and tingling to extremities  Edema:  Severe edema/errythema in BLEs    Therapeutic Exercises:  NT    Patient education  Pt educated on purpose

## 2020-08-19 LAB
ANION GAP SERPL CALCULATED.3IONS-SCNC: 14 MMOL/L (ref 7–16)
BLOOD CULTURE, ROUTINE: NORMAL
BUN BLDV-MCNC: 36 MG/DL (ref 8–23)
CALCIUM SERPL-MCNC: 9.6 MG/DL (ref 8.6–10.2)
CHLORIDE BLD-SCNC: 88 MMOL/L (ref 98–107)
CO2: 33 MMOL/L (ref 22–29)
CREAT SERPL-MCNC: 1 MG/DL (ref 0.5–1)
CULTURE, BLOOD 2: NORMAL
GFR AFRICAN AMERICAN: >60
GFR NON-AFRICAN AMERICAN: 55 ML/MIN/1.73
GLUCOSE BLD-MCNC: 319 MG/DL (ref 74–99)
HBA1C MFR BLD: 9.8 % (ref 4–5.6)
METER GLUCOSE: 201 MG/DL (ref 74–99)
METER GLUCOSE: 238 MG/DL (ref 74–99)
METER GLUCOSE: 280 MG/DL (ref 74–99)
METER GLUCOSE: 348 MG/DL (ref 74–99)
POTASSIUM REFLEX MAGNESIUM: 4.2 MMOL/L (ref 3.5–5)
SODIUM BLD-SCNC: 135 MMOL/L (ref 132–146)

## 2020-08-19 PROCEDURE — 83036 HEMOGLOBIN GLYCOSYLATED A1C: CPT

## 2020-08-19 PROCEDURE — 6370000000 HC RX 637 (ALT 250 FOR IP): Performed by: INTERNAL MEDICINE

## 2020-08-19 PROCEDURE — 2580000003 HC RX 258: Performed by: EMERGENCY MEDICINE

## 2020-08-19 PROCEDURE — 82962 GLUCOSE BLOOD TEST: CPT

## 2020-08-19 PROCEDURE — 99232 SBSQ HOSP IP/OBS MODERATE 35: CPT | Performed by: INTERNAL MEDICINE

## 2020-08-19 PROCEDURE — 2500000003 HC RX 250 WO HCPCS: Performed by: INTERNAL MEDICINE

## 2020-08-19 PROCEDURE — 2700000000 HC OXYGEN THERAPY PER DAY

## 2020-08-19 PROCEDURE — 36415 COLL VENOUS BLD VENIPUNCTURE: CPT

## 2020-08-19 PROCEDURE — 6370000000 HC RX 637 (ALT 250 FOR IP): Performed by: EMERGENCY MEDICINE

## 2020-08-19 PROCEDURE — 80048 BASIC METABOLIC PNL TOTAL CA: CPT

## 2020-08-19 PROCEDURE — 2140000000 HC CCU INTERMEDIATE R&B

## 2020-08-19 PROCEDURE — 99254 IP/OBS CNSLTJ NEW/EST MOD 60: CPT | Performed by: INTERNAL MEDICINE

## 2020-08-19 RX ORDER — BUMETANIDE 1 MG/1
1 TABLET ORAL 2 TIMES DAILY
Status: DISCONTINUED | OUTPATIENT
Start: 2020-08-19 | End: 2020-08-20

## 2020-08-19 RX ORDER — INSULIN GLARGINE 100 [IU]/ML
25 INJECTION, SOLUTION SUBCUTANEOUS 2 TIMES DAILY
Status: DISCONTINUED | OUTPATIENT
Start: 2020-08-19 | End: 2020-08-19

## 2020-08-19 RX ADMIN — Medication 1 EACH: at 12:30

## 2020-08-19 RX ADMIN — ESCITALOPRAM 20 MG: 10 TABLET, FILM COATED ORAL at 08:10

## 2020-08-19 RX ADMIN — BUMETANIDE 2 MG: 0.25 INJECTION INTRAMUSCULAR; INTRAVENOUS at 06:44

## 2020-08-19 RX ADMIN — Medication 10 ML: at 21:22

## 2020-08-19 RX ADMIN — SPIRONOLACTONE 25 MG: 25 TABLET ORAL at 06:45

## 2020-08-19 RX ADMIN — METOPROLOL SUCCINATE 100 MG: 100 TABLET, EXTENDED RELEASE ORAL at 08:10

## 2020-08-19 RX ADMIN — Medication 1 EACH: at 17:56

## 2020-08-19 RX ADMIN — BUMETANIDE 2 MG: 0.25 INJECTION INTRAMUSCULAR; INTRAVENOUS at 14:03

## 2020-08-19 RX ADMIN — PANTOPRAZOLE SODIUM 20 MG: 20 TABLET, DELAYED RELEASE ORAL at 06:45

## 2020-08-19 RX ADMIN — Medication 1 EACH: at 06:33

## 2020-08-19 RX ADMIN — MONTELUKAST SODIUM 10 MG: 10 TABLET, FILM COATED ORAL at 20:40

## 2020-08-19 RX ADMIN — APIXABAN 5 MG: 5 TABLET, FILM COATED ORAL at 20:39

## 2020-08-19 RX ADMIN — INSULIN LISPRO 10 UNITS: 100 INJECTION, SOLUTION INTRAVENOUS; SUBCUTANEOUS at 20:42

## 2020-08-19 RX ADMIN — INSULIN GLARGINE 20 UNITS: 100 INJECTION, SOLUTION SUBCUTANEOUS at 06:31

## 2020-08-19 RX ADMIN — INSULIN LISPRO 12 UNITS: 100 INJECTION, SOLUTION INTRAVENOUS; SUBCUTANEOUS at 11:52

## 2020-08-19 RX ADMIN — DILTIAZEM HYDROCHLORIDE 240 MG: 240 CAPSULE, EXTENDED RELEASE ORAL at 08:10

## 2020-08-19 RX ADMIN — GABAPENTIN 300 MG: 300 CAPSULE ORAL at 08:11

## 2020-08-19 RX ADMIN — INSULIN HUMAN 45 UNITS: 500 INJECTION, SOLUTION SUBCUTANEOUS at 06:31

## 2020-08-19 RX ADMIN — INSULIN LISPRO 10 UNITS: 100 INJECTION, SOLUTION INTRAVENOUS; SUBCUTANEOUS at 17:57

## 2020-08-19 RX ADMIN — Medication 10 ML: at 08:10

## 2020-08-19 RX ADMIN — IBUPROFEN 400 MG: 800 TABLET ORAL at 20:40

## 2020-08-19 RX ADMIN — GABAPENTIN 300 MG: 300 CAPSULE ORAL at 20:40

## 2020-08-19 RX ADMIN — BUMETANIDE 1 MG: 1 TABLET ORAL at 17:57

## 2020-08-19 RX ADMIN — APIXABAN 5 MG: 5 TABLET, FILM COATED ORAL at 08:10

## 2020-08-19 RX ADMIN — HYDROCODONE BITARTRATE AND ACETAMINOPHEN 1 TABLET: 5; 325 TABLET ORAL at 14:02

## 2020-08-19 RX ADMIN — GABAPENTIN 300 MG: 300 CAPSULE ORAL at 14:02

## 2020-08-19 RX ADMIN — INSULIN LISPRO 9 UNITS: 100 INJECTION, SOLUTION INTRAVENOUS; SUBCUTANEOUS at 06:28

## 2020-08-19 RX ADMIN — HYDROCODONE BITARTRATE AND ACETAMINOPHEN 1 TABLET: 5; 325 TABLET ORAL at 06:27

## 2020-08-19 RX ADMIN — IBUPROFEN 400 MG: 800 TABLET ORAL at 04:25

## 2020-08-19 RX ADMIN — FERROUS SULFATE TAB 325 MG (65 MG ELEMENTAL FE) 325 MG: 325 (65 FE) TAB at 08:10

## 2020-08-19 ASSESSMENT — PAIN SCALES - GENERAL
PAINLEVEL_OUTOF10: 7
PAINLEVEL_OUTOF10: 5
PAINLEVEL_OUTOF10: 6
PAINLEVEL_OUTOF10: 2
PAINLEVEL_OUTOF10: 5
PAINLEVEL_OUTOF10: 0

## 2020-08-19 ASSESSMENT — PAIN DESCRIPTION - ONSET
ONSET: ON-GOING
ONSET: GRADUAL
ONSET: ON-GOING

## 2020-08-19 ASSESSMENT — PAIN DESCRIPTION - ORIENTATION
ORIENTATION: RIGHT;LEFT
ORIENTATION: RIGHT;LEFT
ORIENTATION: LEFT;RIGHT

## 2020-08-19 ASSESSMENT — PAIN DESCRIPTION - PAIN TYPE
TYPE: CHRONIC PAIN

## 2020-08-19 ASSESSMENT — PAIN DESCRIPTION - DESCRIPTORS
DESCRIPTORS: CONSTANT;ACHING;DISCOMFORT

## 2020-08-19 ASSESSMENT — PAIN DESCRIPTION - PROGRESSION
CLINICAL_PROGRESSION: NOT CHANGED

## 2020-08-19 ASSESSMENT — PAIN DESCRIPTION - LOCATION
LOCATION: LEG
LOCATION: LEG
LOCATION: LEG;FOOT

## 2020-08-19 ASSESSMENT — PAIN DESCRIPTION - FREQUENCY
FREQUENCY: CONTINUOUS

## 2020-08-19 ASSESSMENT — PAIN DESCRIPTION - DIRECTION: RADIATING_TOWARDS: FEET

## 2020-08-19 NOTE — PROGRESS NOTES
Hospitalist Progress Note      Synopsis: Patient admitted on 8/14/2020 with acute hypoxic respiratory failure 2/2 acute on chronic CHF exacerbation. Getting diuresed. Also started on empiric coverage for possible pneumonia. Insulin regimen increased for glycemic control. Subjective  Some improvement in her breathing  She reports she does not seem interested in dietitian consultation. She is agreeable with endocrinology evaluation next  Her ultrasound are negative for DVT of bilateral lower extremities. Exam:  BP (!) 146/73   Pulse 89   Temp 97.9 °F (36.6 °C) (Temporal)   Resp 18   Ht 5' 3\" (1.6 m)   Wt 291 lb 6.4 oz (132.2 kg)   LMP  (LMP Unknown)   SpO2 98%   BMI 51.62 kg/m²   General appearance: No apparent distress, appears stated age and cooperative. HEENT: Pupils equal, round, and reactive to light. Conjunctivae/corneas clear. Neck: +JVD  Respiratory:  Normal respiratory effort. Clear to auscultation, bilaterally without Rales/Wheezes/Rhonchi. Cardiovascular: Regular rate and rhythm with normal S1/S2 without murmurs, rubs or gallops. Abdomen: Soft, non-tender, non-distended with normal bowel sounds.   Musculoskeletal: Edema in b/l lower extremities   Skin:  No rashes    Neurologic: awake, alert and following commands     Medications:  Reviewed    Infusion Medications    dextrose       Scheduled Medications    insulin regular human  50 Units Subcutaneous TID AC    insulin glargine  25 Units Subcutaneous BID    bumetanide  2 mg Intravenous TID    insulin lispro  0-18 Units Subcutaneous TID WC    insulin lispro  0-9 Units Subcutaneous Nightly    sodium chloride flush  10 mL Intravenous 2 times per day    spironolactone  25 mg Oral Daily    apixaban  5 mg Oral BID    escitalopram  20 mg Oral Daily    ferrous sulfate  325 mg Oral Daily with breakfast    gabapentin  300 mg Oral TID    montelukast  10 mg Oral Nightly    pantoprazole  20 mg Oral QAM AC    Insulin Pen Needle  1 each Does not apply TID AC    dilTIAZem  240 mg Oral Daily    metoprolol succinate  100 mg Oral Daily     PRN Meds: HYDROcodone 5 mg - acetaminophen, ibuprofen, sodium chloride flush, acetaminophen **OR** acetaminophen, polyethylene glycol, promethazine **OR** ondansetron, ipratropium-albuterol, glucose, dextrose, glucagon (rDNA), dextrose, perflutren lipid microspheres    I/O    Intake/Output Summary (Last 24 hours) at 8/19/2020 1013  Last data filed at 8/19/2020 0431  Gross per 24 hour   Intake 480 ml   Output 3100 ml   Net -2620 ml       Labs:   No results for input(s): WBC, HGB, HCT, PLT in the last 72 hours. Recent Labs     08/17/20  0627 08/18/20  0631 08/19/20  0511    138 135   K 4.1 4.3 4.2   CL 92* 90* 88*   CO2 32* 35* 33*   BUN 28* 29* 36*   CREATININE 0.8 0.9 1.0   CALCIUM 9.5 9.6 9.6       No results for input(s): PROT, ALB, ALKPHOS, ALT, AST, BILITOT, AMYLASE, LIPASE in the last 72 hours. No results for input(s): INR in the last 72 hours. No results for input(s): Tanvir Shown in the last 72 hours. Chronic labs:  Lab Results   Component Value Date    CHOL 287 (H) 05/26/2020    TRIG 512 (H) 05/26/2020    HDL 33 05/26/2020    LDLCALC - (AA) 05/26/2020    TSH 2.240 05/26/2020    INR 1.1 05/31/2020    LABA1C 17.1 (H) 05/26/2020       Radiology:  Imaging studies reviewed today. ASSESSMENT:  Acute on chronic hypoxic respiratory failure  Acute on chronic combined HF  DM2-very uncontrolled  Paroxysmal atrial fibrillation  Pulmonary hypertension  Chronic lymphedema      PLAN:  Titrate insulin regimen. Given A1c of 17 we will consult endocrinology. Never too late. Currently on Bumex 2 mg 3 times daily IV  Continue with Cardizem, Aldactone, Eliquis  Daily weights reviewed. 3 of 6 pounds to 291. Net 15 pounds negative.   Monitor renal function, strict I's and O's, low-salt      Diet: DIET CARDIAC; Carb Control: 4 carb choices (60 gms)/meal  Code Status: Full Code  PT/OT Eval Status: As needed  DVT Prophylaxis:   eliquis  Recommended disposition at discharge:  home w [facility] [place]     +++++++++++++++++++++++++++++++++++++++++++++++++  Yvonne Carmichael MD   [facility].  +++++++++++++++++++++++++++++++++++++++++++++++++  NOTE: This report was transcribed using voice recognition software. Every effort was made to ensure accuracy; however, inadvertent computerized transcription errors may be present.

## 2020-08-19 NOTE — PLAN OF CARE
Problem: Pain:  Goal: Pain level will decrease  Description: Pain level will decrease  8/19/2020 0913 by Janeen Carrera RN  Outcome: Ongoing  8/19/2020 0501 by Martha Ambriz RN  Outcome: Met This Shift     Problem: Skin Integrity:  Goal: Will show no infection signs and symptoms  Description: Will show no infection signs and symptoms  Outcome: Ongoing

## 2020-08-19 NOTE — CARE COORDINATION
SOCIAL WORK/CASEMANAGEMENT TRANSITION OF CARE [id number] [place], [facility], [phone]): met with pt in the room this a.m. went over therapy evals with her and offered leesa and pt declined. She is open to c and had no preference when asked. [address] setup with pt agreement. Need [facility] orders for: cpa, medication compliance with PT and OT. Left note for pcp and rn. Pt said she has  dme needed and spouse to assist. Sw/cm to follow.   Gonzalo Esteves  8/19/2020

## 2020-08-19 NOTE — PROGRESS NOTES
PROGRESS NOTE     CARDIOLOGY    Chief complaint: Seen today for follow up, management & recommendations for shortness of breath, hypervolemia. She denies chest pain. She was sitting in a chair at the side of the bed again today. She was comfortable and in no distress. She states that her breathing has returned to her baseline. Wt Readings from Last 3 Encounters:   08/19/20 291 lb 6.4 oz (132.2 kg)   07/06/20 268 lb (121.6 kg)   05/28/20 250 lb (113.4 kg)     Temp Readings from Last 3 Encounters:   08/19/20 97.8 °F (36.6 °C) (Oral)   06/02/20 97.5 °F (36.4 °C) (Temporal)   05/26/20 97.4 °F (36.3 °C) (Temporal)     BP Readings from Last 3 Encounters:   08/19/20 136/68   07/06/20 132/74   06/02/20 (!) 101/56     Pulse Readings from Last 3 Encounters:   08/19/20 82   07/06/20 79   06/02/20 89         Intake/Output Summary (Last 24 hours) at 8/19/2020 1400  Last data filed at 8/19/2020 1341  Gross per 24 hour   Intake 960 ml   Output 4400 ml   Net -3440 ml       No results for input(s): WBC, HGB, HCT, MCV, PLT in the last 72 hours. Recent Labs     08/17/20  0627 08/18/20  0631 08/19/20  0511    138 135   K 4.1 4.3 4.2   CL 92* 90* 88*   CO2 32* 35* 33*   BUN 28* 29* 36*   CREATININE 0.8 0.9 1.0   MG 1.9  --   --      No results for input(s): PROTIME, INR in the last 72 hours. No results for input(s): CKTOTAL, CKMB, CKMBINDEX, TROPONINI in the last 72 hours. No results for input(s): BNP in the last 72 hours. No results for input(s): CHOL, HDL, TRIG in the last 72 hours.     Invalid input(s): CHOLHDLR, LDLCALCU      insulin regular human (humuLIN R U-500 KWIKPEN) 500 UNIT/ML concentrated injection pen 50 Units, TID AC  insulin glargine (LANTUS) injection vial 25 Units, BID  bumetanide (BUMEX) tablet 1 mg, BID  bumetanide (BUMEX) injection 2 mg, TID  HYDROcodone-acetaminophen (NORCO) 5-325 MG per tablet 1 tablet, Q6H PRN  ibuprofen (ADVIL;MOTRIN) tablet 400 mg, Q6H PRN  insulin lispro (HUMALOG) injection vial 0-18 Units, TID WC  insulin lispro (HUMALOG) injection vial 0-9 Units, Nightly  sodium chloride flush 0.9 % injection 10 mL, 2 times per day  sodium chloride flush 0.9 % injection 10 mL, PRN  acetaminophen (TYLENOL) tablet 650 mg, Q6H PRN    Or  acetaminophen (TYLENOL) suppository 650 mg, Q6H PRN  polyethylene glycol (GLYCOLAX) packet 17 g, Daily PRN  promethazine (PHENERGAN) tablet 12.5 mg, Q6H PRN    Or  ondansetron (ZOFRAN) injection 4 mg, Q6H PRN  spironolactone (ALDACTONE) tablet 25 mg, Daily  apixaban (ELIQUIS) tablet 5 mg, BID  escitalopram (LEXAPRO) tablet 20 mg, Daily  ferrous sulfate (IRON 325) tablet 325 mg, Daily with breakfast  gabapentin (NEURONTIN) capsule 300 mg, TID  ipratropium-albuterol (DUONEB) nebulizer solution 1 ampule, Q4H PRN  montelukast (SINGULAIR) tablet 10 mg, Nightly  pantoprazole (PROTONIX) tablet 20 mg, QAM AC  glucose (GLUTOSE) 40 % oral gel 15 g, PRN  dextrose 50 % IV solution, PRN  glucagon (rDNA) injection 1 mg, PRN  dextrose 5 % solution, PRN  Insulin Pen Needle MISC 1 each, TID AC  perflutren lipid microspheres (DEFINITY) injection 1.65 mg, ONCE PRN  dilTIAZem (CARDIZEM CD) extended release capsule 240 mg, Daily  metoprolol succinate (TOPROL XL) extended release tablet 100 mg, Daily        Review of systems:     Heart: as above   Lungs: as above   Eyes: denies changes in vision or discharge. Ears: denies changes in hearing or pain. Nose: denies epistaxis or masses   Throat: denies sore throat or trouble swallowing. Neuro: denies numbness, tingling, tremors. Skin: denies rashes or itching. : denies hematuria, dysuria   GI: denies vomiting, diarrhea   Psych: denies mood changed, anxiety, depression. Physical exam:    Constitutional: A&O x3, communicates well, no acute distress. Eyes: extraocular muscles intact, PERRL. Normal lids & conjunctiva. No icterus. ENT: clear, no bleeding. No external masses. Lips normal formation.   Neck: supple, full ROM, very difficult to examine but + JVD, no bruits, no lymphadenopathy. No masses. trachea midline. Heart: Mildly distant to auscultation. Regular rate & rhythm, normal S1 & S2, I/VI (normal physiologic) systolic murmur, S4 gallop. No heave. Lungs: Extremely poor air movement bilaterally. CTA. No accessory muscles. Abd: Soft, non-tender. Normal bowel sounds. Super morbidly obese. Neuro: Full ROM X 4, EOMI, no tremors. EXT: severe bilateral lower extremity edema  Skin: warm, dry, intact. Good turgor. Psych: A&O x 3, normal behavior, not anxious. Assessment/Recommendations  1. Very difficult exam.  However, her hypervolemia is improving. Her BUN is increasing. I will give 1 more dose of IV Bumex today and then change her back to oral Bumex. Okay for discharge with cardiology after today's IV dose. 2. Known stage II diastolic dysfunction. 3. Paroxysmal atrial fibrillation. 4. Hypertension  5. LVH  6. Severe oxygen requiring COPD. 7. Chronic lymphedema but severe hypervolemia on top of this. 8. PFO  9. Super morbid obesity  10. Pulmonary hypertension  11. Anemia  12. Severe/uncontrolled diabetes.

## 2020-08-19 NOTE — PLAN OF CARE
Problem: Pain:  Goal: Pain level will decrease  Description: Pain level will decrease  8/19/2020 1820 by Aj Chance RN  Outcome: Met This Shift  8/19/2020 0913 by Sage Hernandes RN  Outcome: Ongoing  8/19/2020 0501 by Estela Frank RN  Outcome: Met This Shift  Goal: Control of acute pain  Description: Control of acute pain  Outcome: Met This Shift  Goal: Control of chronic pain  Description: Control of chronic pain  Outcome: Met This Shift     Problem: Skin Integrity:  Goal: Will show no infection signs and symptoms  Description: Will show no infection signs and symptoms  8/19/2020 1820 by Aj Chance RN  Outcome: Met This Shift  8/19/2020 0913 by Sage Hernandes RN  Outcome: Ongoing  Goal: Absence of new skin breakdown  Description: Absence of new skin breakdown  Outcome: Met This Shift     Problem: Falls - Risk of:  Goal: Will remain free from falls  Description: Will remain free from falls  Outcome: Met This Shift  Goal: Absence of physical injury  Description: Absence of physical injury  Outcome: Met This Shift     Problem: OXYGENATION/RESPIRATORY FUNCTION  Goal: Patient will maintain patent airway  Outcome: Met This Shift  Goal: Patient will achieve/maintain normal respiratory rate/effort  Description: Respiratory rate and effort will be within normal limits for the patient  Outcome: Met This Shift     Problem: HEMODYNAMIC STATUS  Goal: Patient has stable vital signs and fluid balance  Outcome: Met This Shift     Problem: FLUID AND ELECTROLYTE IMBALANCE  Goal: Fluid and electrolyte balance are achieved/maintained  Outcome: Met This Shift     Problem: ACTIVITY INTOLERANCE/IMPAIRED MOBILITY  Goal: Mobility/activity is maintained at optimum level for patient  Outcome: Met This Shift

## 2020-08-19 NOTE — CONSULTS
ENDOCRINOLOGY INITIAL CONSULTATION NOTE      Date of admission: 8/14/2020  Date of service: 8/19/2020  Admitting physician: Maulik Edwards MD   Primary Care Physician: Ibeth Grider DO  Consultant physician: Mireille Fernandez MD     Reason for the consultation:  Uncontrolled DM    History of Present Illness: The history is provided by the patient. Accuracy of the patient data is excellent    Radha Guzman is a very pleasant 75 y.o. old female with PMH including DM type 2 on insulin, chronic right buttock wound , COPD, HTN, morbid obesity, and others listed below admitted to [facility] on 8/14/2020 because of acute on chronic hypoxic respiratory failure, endocrine team was consulted for diabetes management. Prior to admission  The patient was diagnosed with type 2 DM at the age 38. Prior to admission patient was on U500 insulin 45 U TID with meals . Patient has had no hypoglycemic episodes. Patient has not been eating consistent carbohydrate meals, self-blood glucose monitoring has been above goal prior to admission. In addition, patient reported h/o neuropathy but denied retinopathy or nephropathy. Lab Results   Component Value Date    LABA1C 9.8 08/19/2020       After admission   While hospitalized, anti-diabetic regiment includes lantus 25 BID with HDSS and U 500 50 U TID with meals. Point of care glucose monitoring was reviewed and has been above goal. Appetite is good.      Inpatient diet:   Carb Restricted diet     Point of care glucose monitoring (Independently reviewed)   Recent Labs     08/17/20  2028 08/18/20  0648 08/18/20  1112 08/18/20  1620 08/18/20  1940 08/19/20  0436 08/19/20  1108 08/19/20  1732   GLUMET 154* 201* 277* 207* 207* 280* 348* 238*       Past medical history:   Past Medical History:   Diagnosis Date    (HFpEF) heart failure with preserved ejection fraction ([facility] [address]     1/16/2018- echo- LVEF 55-65%    Anal fissure     Anemia 10/6/2017    Anxiety and depression     Arthritis     Asthma  Atrial fibrillation (HCC)     Eliquis    Blood transfusion     long time no reaction    CHF (congestive heart failure) (HCC)     Diastolic    COPD (chronic obstructive pulmonary disease) (HCC)     Disc disorder     DM2 (diabetes mellitus, type 2) ([facility] [address]     on insulin    Fall due to stumbling 10/6/2017    GERD (gastroesophageal reflux disease)     Hx of blood clots     Hyperlipidemia     Hypertension     Inappropriate sinus tachycardia     LVH (left ventricular hypertrophy)     moderate    Lymphadenitis, chronic 4/13/2018    Occipital neuralgia 11/2/2011    Respiratory acidosis 10/7/2017    Sinus tachycardia 2/16/2015       Past surgical history:  Past Surgical History:   Procedure Laterality Date    ANOSCOPY  10/25/2006    injection of anal sphincter with Botox, Franklyn Ortiz/Timmy, [facility]    ANOSCOPY  4/9/2007    injection of anal sphincter with Botox, Dr. Margarita Jacinto, [address] [place] [address] ANOSCOPY  6/13/2007    injection of anal sphincter with Botox, Franklyn Mathias [facility]    ANUS SURGERY  4/4/2006    [facility]  4/[facility]/2012    anal exam and injection of Botox 100 units into anal sphincter, Laila Mathias, [facility]    APPENDECTOMY  1965   [address]    COLONOSCOPY  1/20/2004    cecal polyp, bx (no pathologic changes), Dr. Donavon Bob, [address] [place] [address] COLONOSCOPY  8/11/2006    snare polypectomy terminal ileum polyp (granulation tissue polyp) and anal polyp (inflammatory/papilla), Dr. Margarita Jacinto, [address] [place] [address] COLONOSCOPY  3/23/2012    bx/cauterization proximal ascending colon polyp, injection of anal sphincter with Botox, Dr. Margarita Jacinto, [facility]  2003    [address] [facility], SKIN, SUB-Q TISSUE,MUSCLE,=<20 SQ CM Left 11/30/2018    LEFT LEG EXCISIONAL  DEBRIDEMENT WITH TISSUE BIOPSY performed by Jyoti Apple DPM at [address] [place] [address] UPPER GASTROINTESTINAL ENDOSCOPY  1/20/2004    gastritis, bx (no H pylori), Dr. Chapincito Goss, [address] ENDOSCOPY N/A 6/1/2018    EGD BIOPSY performed by Luke Head MD at [address] [facility] N/A 11/9/2018    EGD BIOPSY performed by Luke Head MD at [address] [facility] history:   Tobacco:   reports that she quit smoking about 15 years ago. Her smoking use included cigarettes. She started smoking about 40 years ago. She has a 37.50 pack-year smoking history. She has never used smokeless tobacco.  Alcohol:   reports no history of alcohol use. Drugs:   reports no history of drug use. Family history:    Family History   Problem Relation Age of Onset    Heart Disease Mother     Diabetes Father     Colon Cancer Father     Other Father         BLINDNESS    Colon Cancer Sister     Diabetes Paternal Aunt     Diabetes Paternal Uncle     Diabetes Paternal Aunt     Diabetes Paternal Uncle        Allergy and drug reactions:    Allergies   Allergen Reactions    Statins Other (See Comments)     Muscle pains       Scheduled Meds:   bumetanide  1 mg Oral BID    insulin regular human  60 Units Subcutaneous TID AC    insulin lispro  0-30 Units Subcutaneous 4x Daily AC & HS    sodium chloride flush  10 mL Intravenous 2 times per day    spironolactone  25 mg Oral Daily    apixaban  5 mg Oral BID    escitalopram  20 mg Oral Daily    ferrous sulfate  325 mg Oral Daily with breakfast    gabapentin  300 mg Oral TID    montelukast  10 mg Oral Nightly    pantoprazole  20 mg Oral QAM AC    Insulin Pen Needle  1 each Does not apply TID AC    dilTIAZem  240 mg Oral Daily    metoprolol succinate  100 mg Oral Daily     PRN Meds:   HYDROcodone 5 mg - acetaminophen, 1 tablet, Q6H PRN  ibuprofen, 400 mg, Q6H PRN  sodium chloride flush, 10 mL, PRN  acetaminophen, 650 mg, Q6H PRN    Or  acetaminophen, 650 mg, Q6H PRN  polyethylene glycol, 17 g, Daily PRN  promethazine, 12.5 mg, Q6H PRN    Or  ondansetron, 4 mg, Q6H PRN  ipratropium-albuterol, 1 ampule, Q4H PRN  glucose, 15 g, PRN  dextrose, 12.5 g, PRN  glucagon (rDNA), 1 mg, PRN  dextrose, 100 mL/hr, PRN  perflutren lipid microspheres, 1.5 mL, ONCE PRN      Continuous Infusions:   dextrose         Review of Systems  All systems reviewed. All negative except for symptoms mentioned in HPI     Constitutional: No fever, no chills, no diaphoresis, no generalized weakness. HEENT: No blurred vision, No sore throat, no ear pain, no hair loss  Neck: denied any neck swelling, difficulty swallowing,   Cardio-pulmonary: No CP, breathing comfortable in current NC  GI: No N/V/D, no constipation, No abdominal pain, no melena or hematochezia   : Denied any dysuria, hematuria, flank pain, discharge, or incontinence. Skin: severe lymphedema present on b/l legs   MSK: denied any joint deformity, joint pain/swelling, muscle pain, or back pain. Neuro: positive for numbness and tingling in UE and LE    OBJECTIVE    BP (!) 146/65   Pulse 88   Temp 97.8 °F (36.6 °C) (Oral)   Resp 18   Ht 5' 3\" (1.6 m)   Wt 291 lb 6.4 oz (132.2 kg)   LMP  (LMP Unknown)   SpO2 98%   BMI 51.62 kg/m²     Intake/Output Summary (Last 24 hours) at 8/19/2020 2001  Last data filed at 8/19/2020 1341  Gross per 24 hour   Intake 780 ml   Output 3200 ml   Net -2420 ml       Physical examination:  General: awake alert, oriented x3, no abnormal position or movements. HEENT: normocephalic non-traumatic, no exophthalmos   Neck: supple, no LN enlargement  Pulm: b/l bibasilar crackles   CVS: regular rate, rhythm, S1 + S2, systolic murmer appreciated at base of the heart  Abd: soft lax, no tenderness, no organomegaly, audible bowel sounds.    Skin: b/l erythematous swollen legs consistent with chronic lymphedema   Feet: sensory exam of the feet is present  Neuro: CN intact, muscle power normal  Psych: normal mood, and affect    Review of Laboratory Data:  I personally reviewed the following labs:   No results for input(s): WBC, RBC, HGB, HCT, MCV, MCH, MCHC, RDW, PLT, MPV in the last 72 hours. Recent Labs     08/17/20  0627 08/18/20  0631 08/19/20  0511    138 135   K 4.1 4.3 4.2   CL 92* 90* 88*   CO2 32* 35* 33*   BUN 28* 29* 36*   CREATININE 0.8 0.9 1.0   GLUCOSE 221* 157* 319*   CALCIUM 9.5 9.6 9.6     Beta-Hydroxybutyrate   Date Value Ref Range Status   05/29/2020 0.18 0.02 - 0.27 mmol/L Final   05/28/2020 0.04 0.02 - 0.27 mmol/L Final   10/13/2018 0.06 0.02 - 0.27 mmol/L Final     Lab Results   Component Value Date    LABA1C 9.8 08/19/2020    LABA1C 17.1 05/26/2020    LABA1C 11.7 01/23/2020     Lab Results   Component Value Date/Time    TSH 2.240 05/26/2020 12:00 PM    T4FREE 1.22 05/12/2017 02:52 PM     Lab Results   Component Value Date    LABA1C 9.8 08/19/2020    GLUCOSE 319 08/19/2020    GLUCOSE 181 12/16/2011    MALBCR <30 05/16/2018    LABMICR <12.0 05/12/2017    LABCREA 11 05/26/2018     Lab Results   Component Value Date    TRIG 512 05/26/2020    HDL 33 05/26/2020    LDLCALC - 05/26/2020    CHOL 287 05/26/2020       Blood culture   Lab Results   Component Value Date    BC 24 Hours no growth 08/14/2020    BC 5 Days- no growth 05/28/2020       Radiology:  US DUP LOWER EXTREMITIES BILATERAL VENOUS   Final Result      No evidence for deep vein thrombosis of the lower extremities. XR CHEST PORTABLE   Final Result   Bibasilar airspace disease. Findings likely represent mild pulmonary   edema but infection and/or aspiration cannot be excluded in the right   clinical setting.              Medical Records/Labs/Images review:   I personally reviewed and summarized previous records   All labs and imaging were reviewed independently     Chris Preston, a 75 y.o.-old female seen today for inpatient diabetes management  Diabetes Mellitus type 2  · HbA1c now 9.8 (was 17.1% in May)    · On U 500 50 U TID with meals at home (using U-100 insulin syringe)   · Patient current regimen is U 500 50 TID with meals, lantus 25 BID and HDSS   · Will change DM regimen to:   · Will increase U500 60U TID with meals   · Discontinue lantus  · Continue HDSS with increment of 5U   · Continue glucose check with meals and at bedtime   · Will titrate insulin dose based on the blood glucose trend & insulin requirement  · Pt will follow with us after discharge. Endocrine follow up appointment Friday 9/4 at 12:20PM     Rt adrenal mass  · CT adrenal  5/30/2020 --> high Hounsfield units with negative washout makings lesion undetermined. · Previous endocrine work up showed normal plasma metanephrines and appropriate response to 1 mg DST (essentially r/o Cushing's and Pheo)   · Pt on Spironolactone and for this reason Renin/Patel wasn't performed   · Pt will need adernal Biopsy     Paroxysmal atrial fibrillation  · Currently normal sinus rhythm   · On cardizem and eliquis   · Cardiology following     The above issues were reviewed with the patient who understood and agreed with the plan. Thank you for allowing us to participate in the care of this patient. Please do not hesitate to contact us with any additional questions. Jenny Maldonado MD  Endocrinologist, [facility] Diabetes Care and Endocrinology   [address] [place] [id number]   Phone: [phone]  Fax: [phone]  --------------------------------------------  An electronic signature was used to authenticate this note.  Cali Leonardo MD on 8/19/2020 at 8:01 PM

## 2020-08-20 VITALS
OXYGEN SATURATION: 99 % | WEIGHT: 293 LBS | HEIGHT: 63 IN | SYSTOLIC BLOOD PRESSURE: 143 MMHG | HEART RATE: 83 BPM | RESPIRATION RATE: 16 BRPM | BODY MASS INDEX: 51.91 KG/M2 | DIASTOLIC BLOOD PRESSURE: 65 MMHG | TEMPERATURE: 98 F

## 2020-08-20 LAB
ANION GAP SERPL CALCULATED.3IONS-SCNC: 14 MMOL/L (ref 7–16)
BUN BLDV-MCNC: 43 MG/DL (ref 8–23)
CALCIUM SERPL-MCNC: 9.6 MG/DL (ref 8.6–10.2)
CHLORIDE BLD-SCNC: 88 MMOL/L (ref 98–107)
CO2: 34 MMOL/L (ref 22–29)
CREAT SERPL-MCNC: 1.2 MG/DL (ref 0.5–1)
GFR AFRICAN AMERICAN: 54
GFR NON-AFRICAN AMERICAN: 45 ML/MIN/1.73
GLUCOSE BLD-MCNC: 181 MG/DL (ref 74–99)
METER GLUCOSE: 149 MG/DL (ref 74–99)
METER GLUCOSE: 191 MG/DL (ref 74–99)
METER GLUCOSE: 255 MG/DL (ref 74–99)
POTASSIUM REFLEX MAGNESIUM: 4.3 MMOL/L (ref 3.5–5)
SODIUM BLD-SCNC: 136 MMOL/L (ref 132–146)

## 2020-08-20 PROCEDURE — 2500000003 HC RX 250 WO HCPCS: Performed by: INTERNAL MEDICINE

## 2020-08-20 PROCEDURE — 2700000000 HC OXYGEN THERAPY PER DAY

## 2020-08-20 PROCEDURE — 36415 COLL VENOUS BLD VENIPUNCTURE: CPT

## 2020-08-20 PROCEDURE — 80048 BASIC METABOLIC PNL TOTAL CA: CPT

## 2020-08-20 PROCEDURE — 99232 SBSQ HOSP IP/OBS MODERATE 35: CPT | Performed by: INTERNAL MEDICINE

## 2020-08-20 PROCEDURE — 6370000000 HC RX 637 (ALT 250 FOR IP): Performed by: EMERGENCY MEDICINE

## 2020-08-20 PROCEDURE — 82962 GLUCOSE BLOOD TEST: CPT

## 2020-08-20 PROCEDURE — 6370000000 HC RX 637 (ALT 250 FOR IP): Performed by: INTERNAL MEDICINE

## 2020-08-20 PROCEDURE — 2580000003 HC RX 258: Performed by: EMERGENCY MEDICINE

## 2020-08-20 RX ORDER — METOPROLOL SUCCINATE 100 MG/1
100 TABLET, EXTENDED RELEASE ORAL DAILY
Qty: 30 TABLET | Refills: 0 | Status: SHIPPED | OUTPATIENT
Start: 2020-08-21 | End: 2020-08-21 | Stop reason: SDUPTHER

## 2020-08-20 RX ORDER — LANOLIN ALCOHOL/MO/W.PET/CERES
3 CREAM (GRAM) TOPICAL NIGHTLY PRN
Status: DISCONTINUED | OUTPATIENT
Start: 2020-08-20 | End: 2020-08-20 | Stop reason: HOSPADM

## 2020-08-20 RX ORDER — BUMETANIDE 0.25 MG/ML
2 INJECTION, SOLUTION INTRAMUSCULAR; INTRAVENOUS ONCE
Status: COMPLETED | OUTPATIENT
Start: 2020-08-20 | End: 2020-08-20

## 2020-08-20 RX ORDER — BUMETANIDE 1 MG/1
2 TABLET ORAL 2 TIMES DAILY
Status: DISCONTINUED | OUTPATIENT
Start: 2020-08-20 | End: 2020-08-20 | Stop reason: HOSPADM

## 2020-08-20 RX ORDER — BUMETANIDE 2 MG/1
2 TABLET ORAL 2 TIMES DAILY
Qty: 60 TABLET | Refills: 0 | Status: SHIPPED | OUTPATIENT
Start: 2020-08-20 | End: 2020-08-27 | Stop reason: SDUPTHER

## 2020-08-20 RX ADMIN — BUMETANIDE 2 MG: 0.25 INJECTION INTRAMUSCULAR; INTRAVENOUS at 14:37

## 2020-08-20 RX ADMIN — Medication 1 EACH: at 16:45

## 2020-08-20 RX ADMIN — DILTIAZEM HYDROCHLORIDE 240 MG: 240 CAPSULE, EXTENDED RELEASE ORAL at 08:38

## 2020-08-20 RX ADMIN — BUMETANIDE 1 MG: 1 TABLET ORAL at 08:38

## 2020-08-20 RX ADMIN — METOPROLOL SUCCINATE 100 MG: 100 TABLET, EXTENDED RELEASE ORAL at 08:38

## 2020-08-20 RX ADMIN — GABAPENTIN 300 MG: 300 CAPSULE ORAL at 08:38

## 2020-08-20 RX ADMIN — HYDROCODONE BITARTRATE AND ACETAMINOPHEN 1 TABLET: 5; 325 TABLET ORAL at 06:46

## 2020-08-20 RX ADMIN — SPIRONOLACTONE 25 MG: 25 TABLET ORAL at 08:38

## 2020-08-20 RX ADMIN — Medication 1 EACH: at 06:53

## 2020-08-20 RX ADMIN — INSULIN LISPRO 5 UNITS: 100 INJECTION, SOLUTION INTRAVENOUS; SUBCUTANEOUS at 16:44

## 2020-08-20 RX ADMIN — PANTOPRAZOLE SODIUM 20 MG: 20 TABLET, DELAYED RELEASE ORAL at 06:47

## 2020-08-20 RX ADMIN — INSULIN LISPRO 15 UNITS: 100 INJECTION, SOLUTION INTRAVENOUS; SUBCUTANEOUS at 11:50

## 2020-08-20 RX ADMIN — GABAPENTIN 300 MG: 300 CAPSULE ORAL at 14:36

## 2020-08-20 RX ADMIN — ESCITALOPRAM 20 MG: 10 TABLET, FILM COATED ORAL at 08:38

## 2020-08-20 RX ADMIN — Medication 1 EACH: at 11:51

## 2020-08-20 RX ADMIN — Medication 10 ML: at 08:39

## 2020-08-20 RX ADMIN — APIXABAN 5 MG: 5 TABLET, FILM COATED ORAL at 08:38

## 2020-08-20 RX ADMIN — HYDROCODONE BITARTRATE AND ACETAMINOPHEN 1 TABLET: 5; 325 TABLET ORAL at 14:22

## 2020-08-20 RX ADMIN — IBUPROFEN 400 MG: 800 TABLET ORAL at 04:11

## 2020-08-20 RX ADMIN — HYDROCODONE BITARTRATE AND ACETAMINOPHEN 1 TABLET: 5; 325 TABLET ORAL at 00:00

## 2020-08-20 RX ADMIN — FERROUS SULFATE TAB 325 MG (65 MG ELEMENTAL FE) 325 MG: 325 (65 FE) TAB at 08:38

## 2020-08-20 RX ADMIN — MELATONIN 3 MG ORAL TABLET 3 MG: 3 TABLET ORAL at 00:46

## 2020-08-20 RX ADMIN — INSULIN LISPRO 5 UNITS: 100 INJECTION, SOLUTION INTRAVENOUS; SUBCUTANEOUS at 06:47

## 2020-08-20 ASSESSMENT — PAIN SCALES - GENERAL
PAINLEVEL_OUTOF10: 6
PAINLEVEL_OUTOF10: 0
PAINLEVEL_OUTOF10: 7
PAINLEVEL_OUTOF10: 7
PAINLEVEL_OUTOF10: 3

## 2020-08-20 ASSESSMENT — PAIN - FUNCTIONAL ASSESSMENT
PAIN_FUNCTIONAL_ASSESSMENT: PREVENTS OR INTERFERES WITH MANY ACTIVE NOT PASSIVE ACTIVITIES
PAIN_FUNCTIONAL_ASSESSMENT: PREVENTS OR INTERFERES SOME ACTIVE ACTIVITIES AND ADLS
PAIN_FUNCTIONAL_ASSESSMENT: ACTIVITIES ARE NOT PREVENTED

## 2020-08-20 ASSESSMENT — PAIN DESCRIPTION - LOCATION
LOCATION: FOOT
LOCATION: FOOT
LOCATION: LEG
LOCATION: HEAD

## 2020-08-20 ASSESSMENT — PAIN DESCRIPTION - DESCRIPTORS
DESCRIPTORS: HEADACHE
DESCRIPTORS: ACHING;CONSTANT
DESCRIPTORS: CONSTANT;DISCOMFORT
DESCRIPTORS: ACHING;DISCOMFORT

## 2020-08-20 ASSESSMENT — PAIN DESCRIPTION - PAIN TYPE
TYPE: CHRONIC PAIN
TYPE: ACUTE PAIN
TYPE: CHRONIC PAIN
TYPE: CHRONIC PAIN

## 2020-08-20 ASSESSMENT — PAIN DESCRIPTION - PROGRESSION
CLINICAL_PROGRESSION: NOT CHANGED

## 2020-08-20 ASSESSMENT — PAIN DESCRIPTION - ORIENTATION
ORIENTATION: RIGHT;LEFT
ORIENTATION: MID
ORIENTATION: RIGHT;LEFT
ORIENTATION: RIGHT;LEFT

## 2020-08-20 ASSESSMENT — PAIN DESCRIPTION - FREQUENCY
FREQUENCY: INTERMITTENT
FREQUENCY: CONTINUOUS
FREQUENCY: CONTINUOUS

## 2020-08-20 ASSESSMENT — PAIN DESCRIPTION - ONSET
ONSET: GRADUAL
ONSET: GRADUAL
ONSET: ON-GOING

## 2020-08-20 ASSESSMENT — PAIN DESCRIPTION - DIRECTION
RADIATING_TOWARDS: FEET
RADIATING_TOWARDS: FEET LEGS

## 2020-08-20 NOTE — CARE COORDINATION
SOCIAL WORK/CASEMANAGEMENT TRANSITION OF CARE [id number] [facility], [address], Chris Jahaira, [phone]): [facility] notified of discharge and hhc orders. Princess Hudson  8/20/2020    [facility] provide a ride at [phone] is to come from 6-6:30 p.m. to  pt to go home. She must wear a face mask. They will call the floor before they arrive. If there is any issues have pt call this number herself. [facility] is  Setup and they have the hhc orders.  Surendra Murillo  8/20/2020

## 2020-08-20 NOTE — PLAN OF CARE
Problem: Pain:  Goal: Control of acute pain  Description: Control of acute pain  Outcome: Met This Shift     Problem: Skin Integrity:  Goal: Absence of new skin breakdown  Description: Absence of new skin breakdown  Outcome: Met This Shift

## 2020-08-20 NOTE — CARE COORDINATION
SOCIAL WORK/CASEMANAGEMENT TRANSITION OF CARE [id number] [facility], [address], Janine Menchaca, [phone]): [facility] notified of discharge and hhc orders. Virgil Thomas  8/20/2020    [facility] provide a ride at [phone] is to come from 6-6:30 p.m. to  pt to go home. She must wear a face mask. They will call the floor before they arrive. If there is any issues have pt call this number herself. [facility] is  Setup and they have the hhc orders. Mike Quiroz  8/20/2020      Pt said her portable o2 tank was here on admission. We checked the room she is in and room 6501 as well and it is not there with her pants. rn aware. I called [employer] and they will deliver her a portable to the room today to get home.

## 2020-08-20 NOTE — PROGRESS NOTES
PROGRESS NOTE     CARDIOLOGY    Chief complaint: Seen today for follow up, management & recommendations for shortness of breath, hypervolemia. She denies chest pain. She was sitting in a chair at the side of the bed. She was comfortable and in no distress. She states that her breathing has returned to her baseline. Wt Readings from Last 3 Encounters:   08/20/20 296 lb 3.2 oz (134.4 kg)   07/06/20 268 lb (121.6 kg)   05/28/20 250 lb (113.4 kg)     Temp Readings from Last 3 Encounters:   08/20/20 98 °F (36.7 °C) (Temporal)   06/02/20 97.5 °F (36.4 °C) (Temporal)   05/26/20 97.4 °F (36.3 °C) (Temporal)     BP Readings from Last 3 Encounters:   08/20/20 (!) 143/65   07/06/20 132/74   06/02/20 (!) 101/56     Pulse Readings from Last 3 Encounters:   08/20/20 83   07/06/20 79   06/02/20 89         Intake/Output Summary (Last 24 hours) at 8/20/2020 1627  Last data filed at 8/20/2020 1411  Gross per 24 hour   Intake 1120 ml   Output 1300 ml   Net -180 ml       No results for input(s): WBC, HGB, HCT, MCV, PLT in the last 72 hours. Recent Labs     08/18/20  0631 08/19/20  0511 08/20/20  0645    135 136   K 4.3 4.2 4.3   CL 90* 88* 88*   CO2 35* 33* 34*   BUN 29* 36* 43*   CREATININE 0.9 1.0 1.2*     No results for input(s): PROTIME, INR in the last 72 hours. No results for input(s): CKTOTAL, CKMB, CKMBINDEX, TROPONINI in the last 72 hours. No results for input(s): BNP in the last 72 hours. No results for input(s): CHOL, HDL, TRIG in the last 72 hours.     Invalid input(s): CHOLHDLR, LDLCALCU      melatonin tablet 3 mg, Nightly PRN  bumetanide (BUMEX) tablet 2 mg, BID  insulin regular human (humuLIN R U-500 KWIKPEN) 500 UNIT/ML concentrated injection pen 60 Units, TID AC  insulin lispro (HUMALOG) injection vial 0-30 Units, 4x Daily AC & HS  HYDROcodone-acetaminophen (NORCO) 5-325 MG per tablet 1 tablet, Q6H PRN  ibuprofen (ADVIL;MOTRIN) tablet 400 mg, Q6H PRN  sodium chloride flush 0.9 % injection 10 mL, 2 times per day  sodium chloride flush 0.9 % injection 10 mL, PRN  acetaminophen (TYLENOL) tablet 650 mg, Q6H PRN    Or  acetaminophen (TYLENOL) suppository 650 mg, Q6H PRN  polyethylene glycol (GLYCOLAX) packet 17 g, Daily PRN  promethazine (PHENERGAN) tablet 12.5 mg, Q6H PRN    Or  ondansetron (ZOFRAN) injection 4 mg, Q6H PRN  spironolactone (ALDACTONE) tablet 25 mg, Daily  apixaban (ELIQUIS) tablet 5 mg, BID  escitalopram (LEXAPRO) tablet 20 mg, Daily  ferrous sulfate (IRON 325) tablet 325 mg, Daily with breakfast  gabapentin (NEURONTIN) capsule 300 mg, TID  ipratropium-albuterol (DUONEB) nebulizer solution 1 ampule, Q4H PRN  montelukast (SINGULAIR) tablet 10 mg, Nightly  pantoprazole (PROTONIX) tablet 20 mg, QAM AC  glucose (GLUTOSE) 40 % oral gel 15 g, PRN  dextrose 50 % IV solution, PRN  glucagon (rDNA) injection 1 mg, PRN  dextrose 5 % solution, PRN  Insulin Pen Needle MISC 1 each, TID AC  perflutren lipid microspheres (DEFINITY) injection 1.65 mg, ONCE PRN  dilTIAZem (CARDIZEM CD) extended release capsule 240 mg, Daily  metoprolol succinate (TOPROL XL) extended release tablet 100 mg, Daily        Review of systems:     Heart: as above   Lungs: as above   Eyes: denies changes in vision or discharge. Ears: denies changes in hearing or pain. Nose: denies epistaxis or masses   Throat: denies sore throat or trouble swallowing. Neuro: denies numbness, tingling, tremors. Skin: denies rashes or itching. : denies hematuria, dysuria   GI: denies vomiting, diarrhea   Psych: denies mood changed, anxiety, depression. Physical exam:    Constitutional: A&O x3, communicates well, no acute distress. Eyes: extraocular muscles intact, PERRL. Normal lids & conjunctiva. No icterus. ENT: clear, no bleeding. No external masses. Lips normal formation. Neck: supple, full ROM, very difficult to examine but + JVD, no bruits, no lymphadenopathy. No masses. trachea midline.   Heart: Mildly distant to auscultation. Regular rate & rhythm, normal S1 & S2, I/VI (normal physiologic) systolic murmur, S4 gallop. No heave. Lungs: Extremely poor air movement bilaterally. CTA. No accessory muscles. Abd: Super morbidly obese. Soft, non-tender. Normal bowel sounds. Neuro: Full ROM X 4, EOMI, no tremors. EXT: Wrapped but severe bilateral lower extremity edema  Skin: warm, dry, intact. Good turgor. Psych: A&O x 3, normal behavior, not anxious. Assessment/Recommendations  1. Very difficult exam.  However, her hypervolemia is improving. Her BUN and creatinine are increasing. I will give 1 more dose of IV Bumex today and then change her back to oral Bumex. Okay for discharge with cardiology after today's IV dose. 2. Known stage II diastolic dysfunction. 3. Paroxysmal atrial fibrillation. 4. Hypertension  5. LVH  6. Severe oxygen requiring COPD. 7. Chronic lymphedema but severe hypervolemia on top of this. 8. PFO  9. Super morbid obesity  10. Pulmonary hypertension  11. Anemia  12. Severe/uncontrolled diabetes.

## 2020-08-20 NOTE — PROGRESS NOTES
ENDOCRINOLOGY PROGRESS NOTE  TELEMEDICINE SERVICE    Date of Service: 8/20/2020  Date of Admission: 8/14/2020  Admitting Physician: Farhat Dumont MD   Primary Care Physician: Garland De La Cruz DO  Consultant physician: Benito Javed MD     Reason for the consultation:  Poorly controlled DM type 2 on U-500 insulin     History of Present Illness: The history is provided by the patient. Accuracy of the patient data is excellent. Christie Nino is a very pleasant 75 y.o. old female with PMH including DM type 2 on insulin, chronic right buttock wound , COPD, HTN, morbid obesity, and others listed below admitted to [facility] on 8/14/2020 because of acute on chronic hypoxic respiratory failure, endocrine team was consulted for diabetes management. Subjective:  Patient seen and examined, no overnight major events.  Appetite is good      Inpatient diet:   Carb Restricted diet     Point of care glucose monitoring:   Independently reviewed   Recent Labs     08/18/20  1940 08/19/20  0436 08/19/20  1108 08/19/20  1732 08/19/20  2013 08/20/20  0414 08/20/20  1107 08/20/20  1625   GLUMET 207* 280* 348* 238* 201* 149* 255* 191*       Scheduled Meds:   bumetanide  2 mg Oral BID    insulin regular human  60 Units Subcutaneous TID AC    insulin lispro  0-30 Units Subcutaneous 4x Daily AC & HS    sodium chloride flush  10 mL Intravenous 2 times per day    spironolactone  25 mg Oral Daily    apixaban  5 mg Oral BID    escitalopram  20 mg Oral Daily    ferrous sulfate  325 mg Oral Daily with breakfast    gabapentin  300 mg Oral TID    montelukast  10 mg Oral Nightly    pantoprazole  20 mg Oral QAM AC    Insulin Pen Needle  1 each Does not apply TID AC    dilTIAZem  240 mg Oral Daily    metoprolol succinate  100 mg Oral Daily     PRN Meds:   melatonin, 3 mg, Nightly PRN  HYDROcodone 5 mg - acetaminophen, 1 tablet, Q6H PRN  ibuprofen, 400 mg, Q6H PRN  sodium chloride flush, 10 mL, PRN  acetaminophen, 650 mg, Q6H PRN Or  acetaminophen, 650 mg, Q6H PRN  polyethylene glycol, 17 g, Daily PRN  promethazine, 12.5 mg, Q6H PRN    Or  ondansetron, 4 mg, Q6H PRN  ipratropium-albuterol, 1 ampule, Q4H PRN  glucose, 15 g, PRN  dextrose, 12.5 g, PRN  glucagon (rDNA), 1 mg, PRN  dextrose, 100 mL/hr, PRN  perflutren lipid microspheres, 1.5 mL, ONCE PRN      Continuous Infusions:   dextrose         Review of Systems  All systems reviewed. All negative except for symptoms mentioned in HPI     OBJECTIVE    BP (!) 143/65   Pulse 83   Temp 98 °F (36.7 °C) (Temporal)   Resp 16   Ht 5' 3\" (1.6 m)   Wt 296 lb 3.2 oz (134.4 kg)   LMP  (LMP Unknown)   SpO2 99%   BMI 52.47 kg/m²     Intake/Output Summary (Last 24 hours) at 8/20/2020 2017  Last data filed at 8/20/2020 1411  Gross per 24 hour   Intake 1120 ml   Output 1300 ml   Net -180 ml       Physical examination:  General: awake alert, oriented x3, no abnormal position or movements. HEENT: normocephalic non-traumatic, no exophthalmos   Neck: supple, no LN enlargement, no thyromegaly, no thyroid tenderness, no JVD. Pulm: Clear equal air entry no added sounds, no wheezing or rhonchi    CVS: S1 + S2, no murmur, no heave  Abd: soft lax, no tenderness, no organomegaly, audible bowel sounds. Skin: warm, no lesions, no rash. Feet: sensory exam of the feet is present, tested with the monofilament. No ulcers or open wounds. Good peripheral pulses  Neuro: CN intact, muscle power normal  Psych: normal mood, and affect    Review of Laboratory Data:  I have reviewed the following:  No results for input(s): WBC, RBC, HGB, HCT, MCV, MCH, MCHC, RDW, PLT, MPV in the last 72 hours.   Recent Labs     08/18/20  0631 08/19/20  0511 08/20/20  0645    135 136   K 4.3 4.2 4.3   CL 90* 88* 88*   CO2 35* 33* 34*   BUN 29* 36* 43*   CREATININE 0.9 1.0 1.2*   GLUCOSE 157* 319* 181*   CALCIUM 9.6 9.6 9.6     Beta-Hydroxybutyrate   Date Value Ref Range Status   05/29/2020 0.18 0.02 - 0.27 mmol/L Final 05/28/2020 0.04 0.02 - 0.27 mmol/L Final   10/13/2018 0.06 0.02 - 0.27 mmol/L Final     Lab Results   Component Value Date    LABA1C 9.8 08/19/2020    LABA1C 17.1 05/26/2020    LABA1C 11.7 01/23/2020     Lab Results   Component Value Date/Time    TSH 2.240 05/26/2020 12:00 PM    T4FREE 1.22 05/12/2017 02:52 PM     Lab Results   Component Value Date    LABA1C 9.8 08/19/2020    GLUCOSE 181 08/20/2020    GLUCOSE 181 12/16/2011    MALBCR <30 05/16/2018    LABMICR <12.0 05/12/2017    LABCREA 11 05/26/2018     Lab Results   Component Value Date    TRIG 512 05/26/2020    HDL 33 05/26/2020    LDLCALC - 05/26/2020    CHOL 287 05/26/2020       Blood culture   Lab Results   Component Value Date    BC 5 Days no growth 08/14/2020    BC 5 Days- no growth 05/28/2020       Radiology:  US DUP LOWER EXTREMITIES BILATERAL VENOUS   Final Result      No evidence for deep vein thrombosis of the lower extremities. XR CHEST PORTABLE   Final Result   Bibasilar airspace disease. Findings likely represent mild pulmonary   edema but infection and/or aspiration cannot be excluded in the right   clinical setting. Medical Records/Labs/Images review:   I personally reviewed and summarized previous records   All labs and imaging were reviewed independently     Chris Preston, a 75 y.o.-old female seen in the hospital today     Diabetes Mellitus type 2  · HbA1c now 9.8 (was 17.1% in May)    · On U 500 45 U TID with meals at home (using U-100 insulin syringe)   · Patient current regimen is U 500 50 TID with meals, lantus 25 BID and HDSS   · Will continue DM regimen :   ? Continue U500 60U TID with meals   ? Continue HDSS with increment of 5U   · Continue glucose check with meals and at bedtime   · Will titrate insulin dose based on the blood glucose trend & insulin requirement  · Pt will follow with us after discharge.  Endocrine follow up appointment Friday 9/4 at 12:20PM   · Recommend discharging on same dose of U500 (45 TID using U-100 pen ) at home      Rt adrenal mass  · CT adrenal  5/30/2020 --> high Hounsfield units with negative washout makings lesion undetermined. · Previous endocrine work up showed normal plasma metanephrines and appropriate response to 1 mg DST (essentially r/o Cushing's and Pheo)   · Pt on Spironolactone and for this reason Renin/Patel wasn't performed   · Pt will need adernal Biopsy      Paroxysmal atrial fibrillation  · Currently normal sinus rhythm   · On cardizem and eliquis   · Cardiology following     The above issues were reviewed with the patient who understood and agreed with the plan. Thank you for allowing us to participate in the care of this patient. Please do not hesitate to contact us with any additional questions. Lexy Linares MD  Endocrinologist, [facility]   [address] [place] [address]   Phone: [phone]  Fax: [phone]  --------------------------------------------  An electronic signature was used to authenticate this note.  Dilan Leyva MD on 8/20/2020 at 8:17 PM

## 2020-08-20 NOTE — PROGRESS NOTES
Hospitalist Progress Note      Synopsis: Patient admitted on 8/14/2020 with acute hypoxic respiratory failure 2/2 acute on chronic CHF exacerbation. Getting diuresed. Also started on empiric coverage for possible pneumonia. Insulin regimen increased for glycemic control. Subjective  Mild improvement in her blood sugars. A1c is less than 10 now  Appreciate endocrinology consultation      Exam:  /60   Pulse 75   Temp 97 °F (36.1 °C) (Temporal)   Resp 16   Ht 5' 3\" (1.6 m)   Wt 296 lb 3.2 oz (134.4 kg)   LMP  (LMP Unknown)   SpO2 99%   BMI 52.47 kg/m²   General appearance: No apparent distress, appears stated age and cooperative. HEENT: Pupils equal, round, and reactive to light. Conjunctivae/corneas clear. Neck: +JVD  Respiratory:  Normal respiratory effort. Clear to auscultation, bilaterally without Rales/Wheezes/Rhonchi. Cardiovascular: Regular rate and rhythm with normal S1/S2 without murmurs, rubs or gallops. Abdomen: Soft, non-tender, non-distended with normal bowel sounds.   Musculoskeletal: Edema in b/l lower extremities   Skin:  No rashes    Neurologic: awake, alert and following commands     Medications:  Reviewed    Infusion Medications    dextrose       Scheduled Medications    bumetanide  1 mg Oral BID    insulin regular human  60 Units Subcutaneous TID AC    insulin lispro  0-30 Units Subcutaneous 4x Daily AC & HS    sodium chloride flush  10 mL Intravenous 2 times per day    spironolactone  25 mg Oral Daily    apixaban  5 mg Oral BID    escitalopram  20 mg Oral Daily    ferrous sulfate  325 mg Oral Daily with breakfast    gabapentin  300 mg Oral TID    montelukast  10 mg Oral Nightly    pantoprazole  20 mg Oral QAM AC    Insulin Pen Needle  1 each Does not apply TID AC    dilTIAZem  240 mg Oral Daily    metoprolol succinate  100 mg Oral Daily     PRN Meds: melatonin, HYDROcodone 5 mg - acetaminophen, ibuprofen, sodium chloride flush, acetaminophen **OR** acetaminophen, polyethylene glycol, promethazine **OR** ondansetron, ipratropium-albuterol, glucose, dextrose, glucagon (rDNA), dextrose, perflutren lipid microspheres    I/O    Intake/Output Summary (Last 24 hours) at 8/20/2020 0754  Last data filed at 8/20/2020 0654  Gross per 24 hour   Intake 880 ml   Output 2600 ml   Net -1720 ml       Labs:   No results for input(s): WBC, HGB, HCT, PLT in the last 72 hours. Recent Labs     08/18/20  0631 08/19/20  0511    135   K 4.3 4.2   CL 90* 88*   CO2 35* 33*   BUN 29* 36*   CREATININE 0.9 1.0   CALCIUM 9.6 9.6       No results for input(s): PROT, ALB, ALKPHOS, ALT, AST, BILITOT, AMYLASE, LIPASE in the last 72 hours. No results for input(s): INR in the last 72 hours. No results for input(s): Satish Fay in the last 72 hours. Chronic labs:  Lab Results   Component Value Date    CHOL 287 (H) 05/26/2020    TRIG 512 (H) 05/26/2020    HDL 33 05/26/2020    LDLCALC - (AA) 05/26/2020    TSH 2.240 05/26/2020    INR 1.1 05/31/2020    LABA1C 9.8 (H) 08/19/2020       Radiology:  Imaging studies reviewed today. ASSESSMENT:  Acute on chronic hypoxic respiratory failure  Acute on chronic combined HF  DM2-very uncontrolled  Paroxysmal atrial fibrillation  Pulmonary hypertension  Chronic lymphedema      PLAN:  Titrate insulin regimen. Given A1c of 17 we will consult endocrinology. Never too late. Bumex has been transitioned to oral.  Continue with Cardizem, Aldactone, Eliquis  Daily weights reviewed. 3 of 6 pounds to 291. Net 15 pounds negative.   Monitor renal function, strict I's and O's, low-salt  Discussed with cardiology  Discharge in the next 24       Diet: DIET CARDIAC; Carb Control: 4 carb choices (60 gms)/meal  Code Status: Full Code  PT/OT Eval Status:   As needed  DVT Prophylaxis:   eliquis  Recommended disposition at discharge:  home w [facility]hc at [place]     +++++++++++++++++++++++++++++++++++++++++++++++++  John Patino MD   [facility].  +++++++++++++++++++++++++++++++++++++++++++++++++  NOTE: This report was transcribed using voice recognition software. Every effort was made to ensure accuracy; however, inadvertent computerized transcription errors may be present.

## 2020-08-20 NOTE — DISCHARGE SUMMARY
Hospitalist Discharge Summary    Patient ID: Molina Corado   Patient : 1955  Patient's PCP: Lorena Mercedes DO    Admit Date: 2020   Admitting Physician: Lani Mccollum MD    Discharge Date:  2020  Discharge Physician: Norberto Euceda MD   Discharge Condition: Stable  Discharge Disposition: [facility] course in brief:  (Please refer to daily progress notes for a comprehensive review of the hospitalization by requesting medical records)    Patient admitted on 2020 with acute hypoxic respiratory failure 2/2 acute on chronic CHF exacerbation. IV diuresed with Bumex - cardiology consulted. Also started on empiric coverage for possible pneumonia. Insulin regimen increased for glycemic control. Endocrinology was consulted as well during the hospitalization and medications were titrated. Outpatient follow-up was recommended. Patient's Bumex dose was changed at the time of discharge. Consults:   IP CONSULT TO HOSPITALIST  IP CONSULT TO CARDIOLOGY  IP CONSULT TO HEART FAILURE NURSE/COORDINATOR  IP CONSULT TO ENDOCRINOLOGY  IP CONSULT TO HOME CARE NEEDS    Discharge Diagnoses:    Acute on chronic hypoxic respiratory failure  Acute on chronic combined HF  DM2-very uncontrolled  Paroxysmal atrial fibrillation  Pulmonary hypertension  Chronic lymphedema      Discharge Instructions / Follow up:  Recommend checking BMP within 1 week of discharge due to increased Bumex dose    Continued appropriate risk factor modification of blood pressure, diabetes and serum lipids will remain essential to reducing risk of future atherosclerotic development    Activity: activity as tolerated    Significant labs:  CBC:   No results for input(s): WBC, RBC, HGB, HCT, MCV, RDW, PLT in the last 72 hours.   BMP:   Recent Labs     20  0631 20  0511 20  0645    135 136   K 4.3 4.2 4.3   CL 90* 88* 88*   CO2 35* 33* 34*   BUN 29* 36* 43*   CREATININE 0.9 1.0 1.2*     LFT:  No results for vial  Commonly known as:  HUMALOG  Inject 0-30 Units into the skin 4 times daily (before meals and nightly) **High Dose Corrective Algorithm**  Glucose: Dose:               No Insulin  140-199           5 Units  200-249 10 Units  250-299 15 Units  300-349 20 Units  350-400 25 Units  Over 400  30 Units        CHANGE how you take these medications    bumetanide 2 MG tablet  Commonly known as:  BUMEX  Take 1 tablet by mouth 2 times daily  What changed:    · medication strength  · See the new instructions. * Insulin Pen Needle 30G X 8 MM Misc  1 each by Does not apply route daily  What changed:  Another medication with the same name was removed. Continue taking this medication, and follow the directions you see here. * [employer] Pen Needles 31G 31G X 8 MM Misc  Generic drug:  Insulin Pen Needle  1 each by Does not apply route daily  What changed:  Another medication with the same name was removed. Continue taking this medication, and follow the directions you see here. insulin regular human 500 UNIT/ML Sopn concentrated injection pen  Commonly known as:  humuLIN R U-500 KWIKPEN  Inject 60 Units into the skin 3 times daily (with meals) 45 units with meals. This is a highly concentrated insulin. After attaching the pen needle, prime with 5 units to ensure proper dosing. What changed:    · how much to take  · Another medication with the same name was removed. Continue taking this medication, and follow the directions you see here. metoprolol succinate 100 MG extended release tablet  Commonly known as:  TOPROL XL  Take 1 tablet by mouth daily  Start taking on:  August 21, 2020  What changed:    · medication strength  · when to take this         * This list has 2 medication(s) that are the same as other medications prescribed for you. Read the directions carefully, and ask your doctor or other care provider to review them with you.             CONTINUE taking these medications    acetaminophen 325 MG omega-3 acid ethyl esters 1 g capsule  Commonly known as:  LOVAZA  Take 2 capsules by mouth 2 times daily     OXYGEN     spironolactone 25 MG tablet  Commonly known as:  ALDACTONE  Take 1 tablet by mouth daily     traZODone 50 MG tablet  Commonly known as:  DESYREL  TAKE 1 TABLET BY MOUTH EVERY DAY AT BEDTIME AS NEEDED FOR SLEEP     UltiCare Alcohol Swabs 70 % Pads     vitamin D 1.25 MG (59236 UT) Caps capsule  Commonly known as:  ERGOCALCIFEROL  TAKE 1 CAPSULE BY MOUTH TWICE WEEKLY         * This list has 4 medication(s) that are the same as other medications prescribed for you. Read the directions carefully, and ask your doctor or other care provider to review them with you. STOP taking these medications    miconazole 2 % powder  Commonly known as:  MICOTIN     nystatin 117792 UNIT/GM cream  Commonly known as:  MYCOSTATIN           Where to Get Your Medications      You can get these medications from any pharmacy    Bring a paper prescription for each of these medications  · bumetanide 2 MG tablet  · insulin lispro 100 UNIT/ML injection vial  · insulin regular human 500 UNIT/ML Sopn concentrated injection pen  · metoprolol succinate 100 MG extended release tablet         Time Spent on discharge is more than 35 minutes in the examination, evaluation, counseling and review of medications and discharge plan.    +++++++++++++++++++++++++++++++++++++++++++++++++  [place], [place]  +++++++++++++++++++++++++++++++++++++++++++++++++  NOTE: This report was transcribed using voice recognition software. Every effort was made to ensure accuracy; however, inadvertent computerized transcription errors may be present.

## 2020-08-20 NOTE — PROGRESS NOTES
Wound care: Introduced self and role. Patient states her coccyx area is healed and does not need attention. Patient discharged today per chart.  Silviano Villaseñor

## 2020-08-20 NOTE — ACP (ADVANCE CARE PLANNING)
ADVANCED CARE PLANNING    Patient Name: Mikki Garcia       YOB: 1955              MRN:    [id number]  Admission Date:  8/14/2020  8:12 PM      Active Diagnoses:  Acute on chronic hypoxic respiratory failure  Acute on chronic combined HF  DM2-very uncontrolled  Paroxysmal atrial fibrillation  Pulmonary hypertension  Chronic lymphedema      These active diagnoses are of sufficient risk that focused discussion on advanced care planning is indicated in order to allow the patient to thoughtfully consider personal goals of care; and, if situations arise that prevent the patient to personally give input, to ensure appropriate representation of their personal desire for different levels and levels of care. Person(s) present in discussion: Patricio Ely MD, Family members: None present. Discussion: Long discussions were had during hospitalization about patient's goals of care, CODE STATUS and surrogate decision makers. Patient reports that she has improved considerably in her blood sugar management and she would like some help with further improving her condition. She complains her sons and surrogate decision makers in the event of inability to express concerns or make informed consent decisions. She would like to pursue consultation with endocrinology to help with her diabetes mellitus. She reports she would like all efforts in the event of cardiac and respiratory arrest.    PLAN:  Code Status:  At this time patient wishes to be Full Code      Time Spent on Advanced Planning Documents: 25 minutes    [address] [place], [place]

## 2020-08-21 ENCOUNTER — CARE COORDINATION (OUTPATIENT)
Dept: CASE MANAGEMENT | Age: 65
End: 2020-08-21

## 2020-08-21 ENCOUNTER — TELEPHONE (OUTPATIENT)
Dept: FAMILY MEDICINE CLINIC | Age: 65
End: 2020-08-21

## 2020-08-21 NOTE — TELEPHONE ENCOUNTER
Pt needs refills on the following:  Metoprolol  Feroous Sulfate  Escitalopram  Uses [facility] on [facility]

## 2020-08-21 NOTE — ADT AUTH CERT
Utilization Reviews         Heart Failure - Care Day 5 (8/19/2020) by Gabi Ayala RN         Review Entered  Review Status    8/20/2020 15:21  Completed        Criteria Review       Care Day: 5 Care Date: 8/19/2020 Level of Care: Intermediate Care    Guideline Day 2    Level Of Care    (X) Floor    Clinical Status    (X) * Hemodynamic stability    (X) * Mental status at baseline    (X) * No evidence of myocardial ischemia    (X) * Cardiac rate and rhythm acceptable    ( ) * Oxygenation at baseline or improved    (X) * Pulmonary edema absent or improved    Routes    (X) Taper parenteral medications    (X) Low-salt diet    Interventions    (X) * Pulmonary catheter absent    (X) Weigh    (X) Possible electrolytes [J]    (X) Possible CXR, ECG, BNP    (X) Oxygen    Medications    (X) Diuretics    8/20/2020 3:21 PM EDT by Mauro Still      bumex 2mg iv tid    (X) Beta-blocker    (X) Possible aldosterone antagonist    * Milestone    Additional Notes    8/19    Intermediate care unit       VS    BP (!) 146/73   Pulse 89   Temp 97.9 °F (36.6 °C) (Temporal)   Resp 18   Ht 5' 3\" (1.6 m)   Wt 291 lb 6.4 oz (132.2 kg)   LMP  (LMP Unknown)   SpO2 98%  4L       MEDS-    insulin regular human 50 Units Subcutaneous TID AC    · insulin glargine 25 Units Subcutaneous BID    · bumetanide 2 mg Intravenous TID    · insulin lispro 0-18 Units Subcutaneous TID WC    · insulin lispro 0-9 Units Subcutaneous Nightly    · sodium chloride flush 10 mL Intravenous 2 times per day    · spironolactone 25 mg Oral Daily    · apixaban 5 mg Oral BID    · escitalopram 20 mg Oral Daily    · ferrous sulfate 325 mg Oral Daily with breakfast    · gabapentin 300 mg Oral TID    · montelukast 10 mg Oral Nightly    · pantoprazole 20 mg Oral QAM AC    · Insulin Pen Needle 1 each Does not apply TID AC    · dilTIAZem 240 mg Oral Daily    · metoprolol succinate 100 mg Oral Daily    Norco 1 tab x2    Advil 400mg po x2       Internal MD note** ASSESSMENT:    Acute on chronic hypoxic respiratory failure    Acute on chronic combined HF    DM2-very uncontrolled    Paroxysmal atrial fibrillation    Pulmonary hypertension    Chronic lymphedema           PLAN:    Titrate insulin regimen.  Given A1c of 17 we will consult endocrinology. Never too late.      Currently on Bumex 2 mg 3 times daily IV    Continue with Cardizem, Aldactone, Eliquis    Daily weights reviewed.  3 of 6 pounds to 291.  Net 15 pounds negative. Monitor renal function, strict I's and O's, low-salt       Cardio note**    Assessment/Recommendations    1. Very difficult exam. [employer], her hypervolemia is improving.  Her BUN is increasing.  I will give 1 more dose of IV Bumex today and then change her back to oral Bumex.  Okay for discharge with cardiology after today's IV dose. 2. Known stage II diastolic dysfunction. 3. Paroxysmal atrial fibrillation.      4. Hypertension    5. LVH    6. Severe oxygen requiring COPD. 7. Chronic lymphedema but severe hypervolemia on top of this. 8. PFO    9. Super morbid obesity    10. Pulmonary hypertension    11. Anemia    12.  Severe/uncontrolled diabetes.           Heart Failure - Care Day 4 (8/18/2020) by Catalina Mcgill RN         Review Entered  Review Status    8/19/2020 09:00  Completed        Criteria Review       Care Day: 4 Care Date: 8/18/2020 Level of Care: Intermediate Care    Guideline Day 2    Level Of Care    (X) Floor    Clinical Status    (X) * Hemodynamic stability    (X) * Mental status at baseline    (X) * No evidence of myocardial ischemia    (X) * Cardiac rate and rhythm acceptable    ( ) * Oxygenation at baseline or improved    8/19/2020 9:00 AM EDT by Radha Grimm      4-5L    (X) * Pulmonary edema absent or improved    Routes    (X) Taper parenteral medications    (X) Low-salt diet    Interventions    (X) * Pulmonary catheter absent    (X) Weigh    (X) Possible electrolytes [J]    (X) Possible CXR, ECG, BNP    (X) diabetes.        Internal MD note    ASSESSMENT:    Acute on chronic hypoxic respiratory failure    Acute on chronic combined HF    DM2    Paroxysmal atrial fibrillation    Pulmonary hypertension    Chronic lymphedema           PLAN:    Cardiology following    HF nurse consult    Diltiazem 240 mg daily    Eliquis 5 mg BID    High dose correction insulin    Lantus 20 units BID    Bumex 2 mg TID    Spironolactone 25 mg daily    Strict I/O    Monitor renal function    PT/OT         Diet: DIET CARDIAC; Carb Control: 4 carb choices (60 gms)/meal

## 2020-08-21 NOTE — CARE COORDINATION
Harvinder 45 Transitions Initial Follow Up Call    Call within 2 business days of discharge: Yes    Patient: Gretel Camacho Patient : 1955   MRN: [id number]  Reason for Admission: Acute respiratory failure with hypoxia  Discharge Date: 20 RARS: Readmission Risk Score: 22      Last Discharge [address] [address]       Complaint Diagnosis Description Type Department Provider    20 Shortness of Breath Acute respiratory failure with hypoxia ([facility] [address] . .. ED to Hosp-Admission (Discharged) (ADMITTED) JULIANA 6SE [address], MD; Ramón Quick,... Spoke with: No one    Facility: [facility]    First attempt to reach the patient for Covid-19 Monitoring at risk Care Transition call post hospital discharge. No answer. No vm.     Follow Up  Future Appointments   Date Time Provider Jason Muir   2020 10:45 AM DO Aster Higgins [facility]   2020 12:20 PM Marlena Gallego MD [facility]       Juan Antonio Wong RN

## 2020-08-22 ENCOUNTER — CARE COORDINATION (OUTPATIENT)
Dept: CASE MANAGEMENT | Age: 65
End: 2020-08-22

## 2020-08-22 NOTE — CARE COORDINATION
Harvinder 45 Transitions Initial Follow Up Call    Call within 2 business days of discharge: Yes    Patient: Cynthia Jones Patient : 1955   MRN: <[id number]>  Reason for Admission: Acute Hypoxic Respiratory Failure secondary to CHF  Discharge Date: 20 RARS: Readmission Risk Score: 22      Last Discharge [facility]       Complaint Diagnosis Description Type Department Provider    20 Shortness of Breath Acute respiratory failure with hypoxia ([address] . .. ED to Hosp-Admission (Discharged) (ADMITTED) [facility] 6SE [address], MD; Pacheco Marc,... Spoke with: Robbie 19: 8[address] Klein    Non-face-to-face services provided:  Reviewed encounter information for continuity of care prior to follow up Care Transitions phone call - chart notes, consults, progress notes, test results, med list, appointments, AVS, other information. Care Transitions 24 Hour Call    Do you have all of your prescriptions and are they filled?:   (Comment: 19: 2 new prescriptions are to be delivered by the pharmacy on 19; patient has difficulty paying for needles (35.00) )  Patient DME:  Commode, Straight cane, Walker, Chair lift, Wheelchair, Shower chair  Patient Home Equipment:  Oxygen  Do you have support at home?:  Partner/Spouse/SO, Grandchild  Are you an active caregiver in your home?:  No  Care Transitions Interventions         Follow Up: Placed a call to the number listed for patient and spoke with her very briefly. She reported that she is still having some shortness of breath with exertion. She mumbled responses to other questions, such as related to edema, cough, congestion, wheezing, chest pain, etc.  I asked if she had all of her medications and she said she had to go to the bathroom and asked if I would call back in a few minutes. I will do that.         Future Appointments   Date Time Provider Jason Muir   2020 10:45 AM Garland De La Cruz, DO [facility]   9/3/2020  1:00 PM RAUL Jean - CNP [place] [facility]   9/4/2020 12:20 PM Cali Leonardo MD [facility] ENDO [facility]       Giovanna De La O RN

## 2020-08-22 NOTE — CARE COORDINATION
Harvinder 45 Transitions Initial Follow Up Call    Call within 2 business days of discharge: Yes    Patient: Sophia Harris Patient : 1955   MRN: <[id number]>  Reason for Admission: Acute Hypoxic Respiratory Failure secondary to CHF  Discharge Date: 20 RARS: Readmission Risk Score: 22      Last Discharge [facility]       Complaint Diagnosis Description Type Department Provider    20 Shortness of Breath Acute respiratory failure with hypoxia ([address] . .. ED to Hosp-Admission (Discharged) (ADMITTED) [facility] 6SE [address], MD; Lorie Palencia,... Spoke with: N/A    Facility: [address] [place] [address]    Non-face-to-face services provided:  Reviewed encounter information for continuity of care prior to follow up Care Transitions phone call - chart notes, consults, progress notes, test results, med list, appointments, AVS, other information. Care Transitions 24 Hour Call    Do you have all of your prescriptions and are they filled?:   (Comment: 19: 2 new prescriptions are to be delivered by the pharmacy on 19; patient has difficulty paying for needles (35.00) )  Patient DME:  Commode, Straight cane, Walker, Chair lift, Wheelchair, Shower chair  Patient Home Equipment:  Oxygen  Do you have support at home?:  Partner/Spouse/SO, Grandchild  Are you an active caregiver in your home?:  No  Care Transitions Interventions         Follow Up: Attempted to make contact again with patient for completion of initial call assessment. No answer. Call went to an unidentifiable answering machine. Will pass on to assigned CTN to follow up on Monday.        Future Appointments   Date Time Provider Jason Muir   2020 10:45 AM DO Aster Higgins [facility]   9/3/2020  1:00 PM RAUL Siddiqui - CNP [facility] CARDIO [facility]   2020 12:20 PM Chris Choi MD [facility]       Te Hernandez RN

## 2020-08-24 ENCOUNTER — CARE COORDINATION (OUTPATIENT)
Dept: CASE MANAGEMENT | Age: 65
End: 2020-08-24

## 2020-08-24 NOTE — CARE COORDINATION
-No further outreach planned as attempts x 2 made for COVID-19 monitoring call.  -Episode of Care to auto resolve.

## 2020-08-25 RX ORDER — ESCITALOPRAM OXALATE 20 MG/1
TABLET ORAL
Qty: 90 TABLET | Refills: 1 | Status: SHIPPED
Start: 2020-08-25 | End: 2020-11-17

## 2020-08-25 RX ORDER — METOPROLOL SUCCINATE 100 MG/1
100 TABLET, EXTENDED RELEASE ORAL DAILY
Qty: 90 TABLET | Refills: 1 | Status: ON HOLD
Start: 2020-08-25 | End: 2021-05-05 | Stop reason: HOSPADM

## 2020-08-25 RX ORDER — FERROUS SULFATE 325(65) MG
325 TABLET ORAL
Qty: 90 TABLET | Refills: 1 | Status: SHIPPED
Start: 2020-08-25 | End: 2021-09-01

## 2020-08-27 ENCOUNTER — OFFICE VISIT (OUTPATIENT)
Dept: FAMILY MEDICINE CLINIC | Age: 65
End: 2020-08-27
Payer: COMMERCIAL

## 2020-08-27 ENCOUNTER — HOSPITAL ENCOUNTER (OUTPATIENT)
Age: 65
Discharge: HOME | End: 2020-08-29
Payer: COMMERCIAL

## 2020-08-27 VITALS
SYSTOLIC BLOOD PRESSURE: 152 MMHG | HEIGHT: 63 IN | TEMPERATURE: 97.4 F | HEART RATE: 91 BPM | DIASTOLIC BLOOD PRESSURE: 86 MMHG | BODY MASS INDEX: 49.61 KG/M2 | RESPIRATION RATE: 18 BRPM | OXYGEN SATURATION: 95 % | WEIGHT: 280 LBS

## 2020-08-27 LAB
ANION GAP SERPL CALCULATED.3IONS-SCNC: 13 MMOL/L (ref 7–16)
BUN BLDV-MCNC: 16 MG/DL (ref 8–23)
CALCIUM SERPL-MCNC: 9.7 MG/DL (ref 8.6–10.2)
CHLORIDE BLD-SCNC: 91 MMOL/L (ref 98–107)
CO2: 31 MMOL/L (ref 22–29)
CREAT SERPL-MCNC: 0.7 MG/DL (ref 0.5–1)
GFR AFRICAN AMERICAN: >60
GFR NON-AFRICAN AMERICAN: >60 ML/MIN/1.73
GLUCOSE BLD-MCNC: 535 MG/DL (ref 74–99)
POTASSIUM SERPL-SCNC: 4.4 MMOL/L (ref 3.5–5)
PRO-BNP: 1005 PG/ML (ref 0–125)
SODIUM BLD-SCNC: 135 MMOL/L (ref 132–146)

## 2020-08-27 PROCEDURE — 80048 BASIC METABOLIC PNL TOTAL CA: CPT

## 2020-08-27 PROCEDURE — 83880 ASSAY OF NATRIURETIC PEPTIDE: CPT

## 2020-08-27 PROCEDURE — 99214 OFFICE O/P EST MOD 30 MIN: CPT | Performed by: FAMILY MEDICINE

## 2020-08-27 PROCEDURE — 1111F DSCHRG MED/CURRENT MED MERGE: CPT | Performed by: FAMILY MEDICINE

## 2020-08-27 RX ORDER — LANSOPRAZOLE 30 MG/1
30 CAPSULE, DELAYED RELEASE ORAL DAILY
Qty: 90 CAPSULE | Refills: 1 | Status: SHIPPED
Start: 2020-08-27 | End: 2020-11-17

## 2020-08-27 RX ORDER — ICOSAPENT ETHYL 1000 MG/1
2 CAPSULE ORAL 2 TIMES DAILY
Qty: 60 CAPSULE | Refills: 3 | Status: SHIPPED
Start: 2020-08-27 | End: 2020-09-01

## 2020-08-27 RX ORDER — MONTELUKAST SODIUM 10 MG/1
10 TABLET ORAL NIGHTLY
Qty: 90 TABLET | Refills: 1 | Status: ON HOLD
Start: 2020-08-27 | End: 2021-05-05 | Stop reason: HOSPADM

## 2020-08-27 RX ORDER — SPIRONOLACTONE 25 MG/1
25 TABLET ORAL DAILY
Qty: 30 TABLET | Refills: 5 | Status: ON HOLD
Start: 2020-08-27 | End: 2022-03-22 | Stop reason: HOSPADM

## 2020-08-27 RX ORDER — ALBUTEROL SULFATE 90 UG/1
2 AEROSOL, METERED RESPIRATORY (INHALATION) 4 TIMES DAILY PRN
Qty: 1 INHALER | Refills: 5 | Status: SHIPPED
Start: 2020-08-27 | End: 2021-04-30

## 2020-08-27 RX ORDER — DILTIAZEM HYDROCHLORIDE 240 MG/1
CAPSULE, COATED, EXTENDED RELEASE ORAL
Qty: 60 CAPSULE | Refills: 2 | Status: SHIPPED
Start: 2020-08-27 | End: 2020-09-22

## 2020-08-27 RX ORDER — BUMETANIDE 2 MG/1
2 TABLET ORAL 2 TIMES DAILY
Qty: 60 TABLET | Refills: 2 | Status: SHIPPED
Start: 2020-08-27 | End: 2020-09-22

## 2020-08-27 RX ORDER — GABAPENTIN 300 MG/1
300 CAPSULE ORAL 3 TIMES DAILY
Qty: 270 CAPSULE | Refills: 0 | Status: SHIPPED
Start: 2020-08-27 | End: 2020-09-22 | Stop reason: SDUPTHER

## 2020-08-27 NOTE — PROGRESS NOTES
Post-Discharge Transitional Care Management Services or Hospital Follow Up      Cynthia Jones   YOB: 1955    Date of Office Visit:  8/27/2020  Date of Hospital Admission: 8/14/20  Date of Hospital Discharge: 8/20/20  Readmission Risk Score(high >=14%.  Medium >=10%):Readmission Risk Score: 22      Care management risk score Rising risk (score 2-5) and Complex Care (Scores >=6): 15     Non face to face  following discharge, date last encounter closed (first attempt may have been earlier): 8/22/2020  1:19 PM 8/22/2020  1:19 PM    Call initiated 2 business days of discharge: Yes     Patient Active Problem List   Diagnosis    Type 2 diabetes mellitus with diabetic neuropathy, with long-term current use of insulin ([address]    Depression    Hypertension    Hyperlipidemia    Osteoarthritis    PAF (paroxysmal atrial fibrillation) ([address] - currently in SR    Pulmonary hypertension    Chronic sinusitis    Chronic pain    Steatohepatitis    Baker's cyst of knee    Diabetic neuropathy ([address]    Iron deficiency    LVH (left ventricular hypertrophy)    Chronic anal fissure    Positive hepatitis C antibody test    PFO (patent foramen ovale)    Chronic diastolic heart failure (HCC)    Physical deconditioning    Lymphedema of both lower extremities    Chronic anemia    Dyspnea on exertion    Chronic deep vein thrombosis (DVT) of distal vein of right lower extremity (HCC)    GI bleed    Palpitations    Occult blood in stools    Anxiety and depression    Chronic anticoagulation    COPD (chronic obstructive pulmonary disease) (HCC)    Acute on chronic respiratory failure with hypoxia (HCC)    Blood loss anemia    Sepsis ([address]    Cellulitis of left lower extremity    Poorly controlled diabetes mellitus ([address]    Lymphedema    Septicemia ([address]    Acute diastolic congestive heart failure (HCC)    Acute on chronic anemia    Morbid obesity ([address]    Acute hypoxemic respiratory failure ([address]  Acute on chronic diastolic heart failure (HCC)    Cellulitis    Stress fracture of right fibula    Atrial fibrillation, currently in sinus rhythm    Gastroesophageal reflux disease without esophagitis    Ulcers of both lower legs ([facility])    Chronic respiratory failure with hypoxia, on home O2 therapy ([facility])    Abscess and cellulitis of gluteal region    CHF (congestive heart failure), NYHA class I, acute on chronic, combined (HCC)    Adrenal mass (HCC)       Allergies   Allergen Reactions    Statins Other (See Comments)     Muscle pains       Medications listed as ordered at the time of discharge from [facility] Medication Instructions VINNIE:    Printed on:08/31/20 9293   Medication Information                      acetaminophen (TYLENOL) 325 MG tablet  Take 650 mg by mouth every 6 hours as needed for Pain             albuterol sulfate  (90 Base) MCG/ACT inhaler  Inhale 2 puffs into the lungs 4 times daily as needed for Wheezing             apixaban (ELIQUIS) 5 MG TABS tablet  TAKE 1 TABLET BY MOUTH TWICE A DAY             BD INSULIN SYRINGE U/F 31G X 5/16\" 1 ML MISC  USE AS DIRECTED             blood glucose monitor strips  Test 4 times a day & as needed for symptoms of irregular blood glucose.              blood glucose test strips (ASCENSIA AUTODISC VI;ONE TOUCH ULTRA TEST VI) strip  Use 4 times daily             bumetanide (BUMEX) 2 MG tablet  Take 1 tablet by mouth 2 times daily             dilTIAZem (CARDIZEM CD) 240 MG extended release capsule  TAKE 1 CAPSULE BY MOUTH TWICE A DAY             Elastic Bandages & Supports MISC  20-30 mmHg bilateral compression stockings  Size to fit  Dx:  Edema  Knee high             escitalopram (LEXAPRO) 20 MG tablet  TAKE 1 TABLET BY MOUTH DAILY             ferrous sulfate (IRON 325) 325 (65 Fe) MG tablet  Take 1 tablet by mouth daily (with breakfast)             gabapentin (NEURONTIN) 300 MG capsule  Take 1 capsule by mouth 3 times daily for 30 days. glucose (GLUTOSE) 40 % GEL  Take 37.5 mLs by mouth as needed (hypoglycemia)             glucose monitoring kit (FREESTYLE) monitoring kit  Use once.              Icosapent Ethyl (VASCEPA) 1 g CAPS capsule  Take 2 capsules by mouth 2 times daily             insulin lispro (HUMALOG) 100 UNIT/ML injection vial  4 times daily (before meals and nightly)  **High Dose Corrective Algorithm**  Glucose: Dose:               No Insulin  140-199           5 Units  200-249 10 Units  250-299 15 Units  300-349 20 Units  350-400 25 Units  Over 400  30 Units             Insulin Pen Needle ([employer] PEN NEEDLES 31G) 31G X 8 MM MISC  1 each by Does not apply route daily             Insulin Pen Needle 30G X 8 MM MISC  1 each by Does not apply route daily             insulin regular (HUMULIN R) 100 UNIT/ML injection  Inject 45 Units into the skin 3 times daily (before meals)             ipratropium-albuterol (DUONEB) 0.5-2.5 (3) MG/3ML SOLN nebulizer solution  USE 1 VIAL VIA NEBULIZER EVERY 4 HOURS AS NEEDED FOR SHORTNESS OF BREATH             lansoprazole (PREVACID) 30 MG delayed release capsule  Take 1 capsule by mouth daily             Magic Mouthwash (MIRACLE MOUTHWASH)  Swish and spit 5 mLs 4 times daily as needed for Irritation             metoprolol succinate (TOPROL XL) 100 MG extended release tablet  Take 1 tablet by mouth daily             montelukast (SINGULAIR) 10 MG tablet  Take 1 tablet by mouth nightly             omega-3 acid ethyl esters (LOVAZA) 1 g capsule  Take 2 capsules by mouth 2 times daily             OXYGEN  Inhale 4 L into the lungs continuous              spironolactone (ALDACTONE) 25 MG tablet  Take 1 tablet by mouth daily             traZODone (DESYREL) 50 MG tablet  TAKE 1 TABLET BY MOUTH EVERY DAY AT BEDTIME AS NEEDED FOR SLEEP             ULTICARE ALCOHOL SWABS 70 % PADS  USE 3 TIMES A DAY             vitamin D (ERGOCALCIFEROL) 1.25 MG (22524 UT) CAPS capsule  TAKE 1 CAPSULE BY and patient was to only be on prandial insulin. She does have a follow up with endocrinology. bumex was increased to 2 mg BID. She states her breathing has been improved. She does not currently have home health care. She also did run out of oxygen as she was at the office. We did call [facility] and they did bring her portable oxygen. She has not been weighing herself daily. She states she does not have help at home. It is difficult for her to take care of herself. She continues to have issues with lymphedema. Review of Systems   Constitutional: Positive for fatigue. Negative for chills and fever. HENT: Negative for congestion, postnasal drip, sinus pressure, sinus pain and sore throat. Mouth sores   Eyes: Negative for pain. Respiratory: Negative for cough and shortness of breath. Cardiovascular: Positive for leg swelling. Negative for chest pain. Gastrointestinal: Negative for abdominal pain, blood in stool, constipation, diarrhea, nausea and vomiting. Endocrine: Negative for cold intolerance and heat intolerance. Genitourinary: Negative for difficulty urinating, dysuria, frequency and menstrual problem. Musculoskeletal: Positive for arthralgias, back pain and neck pain. Negative for myalgias. Skin: Negative for color change and wound. Allergic/Immunologic: Positive for environmental allergies. Neurological: Negative for dizziness, weakness, light-headedness, numbness and headaches. Hematological: Negative for adenopathy. Psychiatric/Behavioral: Negative for behavioral problems, dysphoric mood and sleep disturbance. The patient is not nervous/anxious. Vitals:    08/27/20 1035 08/27/20 1043   BP: (!) 154/86 (!) 152/86   Pulse: 91    Resp: 18    Temp: 97.4 °F (36.3 °C)    SpO2: 95%    Weight: 280 lb (127 kg)    Height: 5' 3\" (1.6 m)      Body mass index is 49.6 kg/m².    Wt Readings from Last 3 Encounters:   08/27/20 280 lb (127 kg)   08/20/20 296 lb 3.2 oz (134.4 kg) 07/06/20 268 lb (121.6 kg)     BP Readings from Last 3 Encounters:   08/27/20 (!) 152/86   08/20/20 (!) 143/65   07/06/20 132/74       Physical Exam  Vitals signs and nursing note reviewed. Constitutional:       General: She is not in acute distress. Appearance: She is well-developed. She is obese. Comments: Uses walker but in wheelchair currnetly   HENT:      Head: Normocephalic and atraumatic. Right Ear: External ear normal.      Left Ear: External ear normal.      Nose: Nose normal.      Comments: Nasal cannula in place  Eyes:      General: No scleral icterus. Conjunctiva/sclera: Conjunctivae normal.   Neck:      Musculoskeletal: Neck supple. Thyroid: No thyromegaly. Cardiovascular:      Rate and Rhythm: Normal rate and regular rhythm. Heart sounds: Murmur (RAMÓN) present. Pulmonary:      Effort: Pulmonary effort is normal. No respiratory distress. Breath sounds: No wheezing. Abdominal:      Palpations: Abdomen is soft. Tenderness: There is no abdominal tenderness. Genitourinary:     Vagina: Normal.   Musculoskeletal: Normal range of motion. Comments: Edema noted bilaterally up to the thigh   Lymphadenopathy:      Cervical: No cervical adenopathy. Skin:     General: Skin is warm and dry. Comments: Feet and legs wrapped currently   Neurological:      Mental Status: She is alert and oriented to person, place, and time. Psychiatric:         Behavior: Behavior normal.         Thought Content: Thought content normal.         Judgment: Judgment normal.             Assessment/Plan:  1. Chronic respiratory failure with hypoxia (HCC)  Continue to use oxygen at home, home care ordered as well  - [facility]    2. Type 2 diabetes mellitus with diabetic neuropathy, with long-term current use of insulin (HCC)  Insulin adjusted as in the hospital, explained to patient the importance of taking insulin as prescribed and following up with endo as directed. 3. Lymphedema of both lower extremities  Home care ordered, continue to take bumex as well  - [address]    4. Chronic diastolic heart failure [address] [address]  Home care ordered, BMP and BNP ordered. Medications reconciled. Continue to take bumex 2 mg BID. - [address] PANEL; Future  - BRAIN NATRIURETIC PEPTIDE; Future  - NC DISCHARGE MEDS RECONCILED W/ CURRENT OUTPATIENT MED LIST    5. Chronic obstructive pulmonary disease, unspecified COPD type ([facility]  Continue oxygen and medications as directed   - [address]    6. Chronic deep vein thrombosis (DVT) of distal vein of right lower extremity (HCC)  Continue eliquis. - [address]    7.  Atrial fibrillation, unspecified type ([facility]  Continue eliquis and cardizem   - [address] Decision Making: moderate complexity

## 2020-08-31 ENCOUNTER — TELEPHONE (OUTPATIENT)
Dept: FAMILY MEDICINE CLINIC | Age: 65
End: 2020-08-31

## 2020-08-31 ASSESSMENT — ENCOUNTER SYMPTOMS
BACK PAIN: 1
NAUSEA: 0
VOMITING: 0
SHORTNESS OF BREATH: 0
COLOR CHANGE: 0
EYE PAIN: 0
BLOOD IN STOOL: 0
DIARRHEA: 0
SINUS PAIN: 0
COUGH: 0
ABDOMINAL PAIN: 0
SORE THROAT: 0
SINUS PRESSURE: 0
CONSTIPATION: 0

## 2020-09-01 ENCOUNTER — TELEPHONE (OUTPATIENT)
Dept: FAMILY MEDICINE CLINIC | Age: 65
End: 2020-09-01

## 2020-09-01 RX ORDER — OMEGA-3-ACID ETHYL ESTERS 1 G/1
2 CAPSULE, LIQUID FILLED ORAL 2 TIMES DAILY
Qty: 60 CAPSULE | Refills: 3 | Status: ON HOLD | OUTPATIENT
Start: 2020-09-01 | End: 2022-10-04

## 2020-09-02 ENCOUNTER — TELEPHONE (OUTPATIENT)
Dept: FAMILY MEDICINE CLINIC | Age: 65
End: 2020-09-02

## 2020-09-02 NOTE — TELEPHONE ENCOUNTER
Edmundo Ruano with [facility] called they have tried repeatedly to contact the pt to set up [address], but no success, they are canceling the order [facility]

## 2020-09-03 ENCOUNTER — OFFICE VISIT (OUTPATIENT)
Dept: CARDIOLOGY CLINIC | Age: 65
End: 2020-09-03
Payer: COMMERCIAL

## 2020-09-03 VITALS
BODY MASS INDEX: 51.37 KG/M2 | HEART RATE: 80 BPM | SYSTOLIC BLOOD PRESSURE: 130 MMHG | OXYGEN SATURATION: 99 % | DIASTOLIC BLOOD PRESSURE: 64 MMHG | WEIGHT: 290 LBS

## 2020-09-03 PROCEDURE — 1090F PRES/ABSN URINE INCON ASSESS: CPT | Performed by: NURSE PRACTITIONER

## 2020-09-03 PROCEDURE — 1036F TOBACCO NON-USER: CPT | Performed by: NURSE PRACTITIONER

## 2020-09-03 PROCEDURE — 1111F DSCHRG MED/CURRENT MED MERGE: CPT | Performed by: NURSE PRACTITIONER

## 2020-09-03 PROCEDURE — 99213 OFFICE O/P EST LOW 20 MIN: CPT | Performed by: NURSE PRACTITIONER

## 2020-09-03 PROCEDURE — G8400 PT W/DXA NO RESULTS DOC: HCPCS | Performed by: NURSE PRACTITIONER

## 2020-09-03 PROCEDURE — 3017F COLORECTAL CA SCREEN DOC REV: CPT | Performed by: NURSE PRACTITIONER

## 2020-09-03 PROCEDURE — 93000 ELECTROCARDIOGRAM COMPLETE: CPT | Performed by: INTERNAL MEDICINE

## 2020-09-03 PROCEDURE — G8427 DOCREV CUR MEDS BY ELIG CLIN: HCPCS | Performed by: NURSE PRACTITIONER

## 2020-09-03 PROCEDURE — 4040F PNEUMOC VAC/ADMIN/RCVD: CPT | Performed by: NURSE PRACTITIONER

## 2020-09-03 PROCEDURE — G8417 CALC BMI ABV UP PARAM F/U: HCPCS | Performed by: NURSE PRACTITIONER

## 2020-09-03 PROCEDURE — 1123F ACP DISCUSS/DSCN MKR DOCD: CPT | Performed by: NURSE PRACTITIONER

## 2020-09-03 NOTE — PATIENT INSTRUCTIONS
1. Continue current cardiac medications. 2. Referral for sleep study     3. Follow up with CHF infusion clinic in one week    4. Follow up with Dr. Brook oMrales in one month    5. Weigh yourself daily    -Stay Hydrated    -Diet should sodium restricted to 2 grams    -Again watch your daily weight trends and if you gain water weight please follow below instructions.    -If you gain 3-5 pounds in 2-3 days OR notice that you are retaining fluid in anyway just like you did before then take an extra dose of your water pill ( bumetanide/Bumex) every day until you lose the weight or feel better.     -If you notice that you have taken more than 3 extra doses in 1 week then please call and let us know. -If at any time you feel that you are retaining fluid, your medications are not working, or you feel ill in anyway, then please call us for either same day appointment or the next day, and for instructions. Our goal is to keep you out of the emergency room and the hospital and we have ways to do it. You just need to call us in a timely manner.     -If you become sick for other reasons, and notice that you are not urinating as much, the urine is very dark, you have significant diarrhea or vomiting, then please DO NOT take your water pill and CALL US immediately.

## 2020-09-03 NOTE — PROGRESS NOTES
[facility] Cardiology  Office Visit         Reason for Visit: heart failure    Primary Cardiologist: Dr. Loulou Sadler        History of Present Illness:     Ms. Nicole Abbott is a 75year old female with a PMHx of HFpEF, LVH, PAF, HTN, PFO, hx of TIA, DM, COPD with chronic o2 use, probable untreated sleep apnea. .      She was recently hospitalized for acute heart failure with complaints of increased MADRID, orthopnea, lower extremity edema and fatigue. She was IV diuresed with subjective improvement. She presents today in post acute heart failure hospitalization follow up. Since discharge from the hospital she has been compliant with all of her current cardiac medications, she reports good urinary response to oral Bumex with clear yellow urine production. She does have chronic lower extremity lymphedema and follows 3 times a week with  physical therapy. She has been monitoring her diet for sodium content, reportedly her  has been cooking. She has chronic dyspnea with exertion, denies shortness of breath, or decline in overall functional capacity. She denies orthopnea, PND, however she sleeps in a recliner. Denies nocturnal cough or hemoptysis. She denies abdominal distention or bloating, early satiety, anorexia/change in appetite, unintentional weight loss. She does lower extremity edema. She denies exertional lightheadedness. She has occasional palpitations, denies syncope or near syncope. Review of systems is negative for chest pain, pressure, discomfort. When ambulating on an incline, She does not leg claudication. History is negative for neurological symptoms including transient loss of vision, asymmetric weakness, aphasia, dysphasia, numbness, tingling. Past medical, surgical, family and social histories have been reviewed. Any changes in the past medical history, social history or family history have been made and are reflected in the electronic medical record.        Patient Active Problem List Diagnosis Date Noted    Acute on chronic anemia 12/16/2018     Priority: High    Palpitations 11/06/2018     Priority: High    Occult blood in stools 11/06/2018     Priority: High    Anxiety and depression 11/06/2018     Priority: Low    Chronic anticoagulation 11/06/2018     Priority: Low    Adrenal mass (HCC)     CHF (congestive heart failure), NYHA class I, acute on chronic, combined ([address] 08/15/2020    Abscess and cellulitis of gluteal region 05/29/2020    Chronic respiratory failure with hypoxia, on home O2 therapy ([address] 05/26/2020    Ulcers of both lower legs ([address] 01/23/2020    Stress fracture of right fibula 12/24/2019    Atrial fibrillation, currently in sinus rhythm 12/24/2019    Gastroesophageal reflux disease without esophagitis 12/24/2019    Cellulitis 09/22/2019    Acute on chronic diastolic heart failure (HCC)     Acute hypoxemic respiratory failure (HCC)     Morbid obesity (HCC)     Acute diastolic congestive heart failure ([address] 12/15/2018    Septicemia ([address]     Cellulitis of left lower extremity     Poorly controlled diabetes mellitus ([address]     Lymphedema     Sepsis ([address] 11/27/2018    Blood loss anemia     PAF (paroxysmal atrial fibrillation) ([address] - currently in SR 11/06/2018     May 2004, spontaneous conversion to sinus rhythm and sinus tachycardia on Cardizem. Not on coumadin d/t prior, bleeding, GIB, & anemia.       GI bleed 11/06/2018    COPD (chronic obstructive pulmonary disease) ([address] 11/06/2018    Acute on chronic respiratory failure with hypoxia (HCC) 11/06/2018    Chronic deep vein thrombosis (DVT) of distal vein of right lower extremity ([address] 08/01/2018    Dyspnea on exertion     Lymphedema of both lower extremities 04/13/2018    Chronic anemia 04/13/2018    Physical deconditioning 01/19/2018    Chronic diastolic heart failure (HCC) 01/15/2018    PFO (patent foramen ovale) 08/12/2015    Positive hepatitis C antibody test 01/21/2015    Chronic anal fissure 06/14/2013    LVH (left ventricular hypertrophy)      moderate      Iron deficiency 03/15/2013    Diabetic neuropathy ([facility] 02/22/2013    Baker's cyst of knee 02/13/2013    Steatohepatitis 11/13/2012    Chronic pain 07/31/2012    Chronic sinusitis 11/02/2011    Pulmonary hypertension 10/13/2011     Mild pulmonary hypertension with a pulmonary artery systolic pressure of 78-71 mmHg on echo 5/12/04. No cardiac etiology seen.  Hyperlipidemia 06/09/2011    Osteoarthritis 06/09/2011    Hypertension 04/15/2011    Depression 02/18/2011    Type 2 diabetes mellitus with diabetic neuropathy, with long-term current use of insulin ([address] 11/12/2010     Past Medical History:    1. HTN  2. HLD: on statin therapy  3. Chronic Anemia  4. PAF: Not on coumadin d/t Hx of GIB (2004) and chronic anemia. [id number] score at least 5: (DM, TIA, Female, HTN)  5. GERD  6. 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 7. HFpEF:               A. Admitted 01/15/18 with progressive edyspnea troponin normal uYPR=919 hest xray showed b/l effusion, BNP= 814 and respiratory acidosis on ABGx2. She was given DubNebs x3, Solumedrol and placed on BiPAP. CBC showed an anemic Hemoglobin = 8.2. IV diuresed ~2.5 L.               B. TTE (1/16/2018, Dr. Marcy Cunningham) [facility] concentric left ventricular hypertrophy. Ejection fraction is visually estimated at 55-65%. Stage II diastolic dysfunction. The left atrium is severely dilated. Elevated L atrial pressure. Mild mitral regurgitation. The aortic valve appears mildly sclerotic. Pulmonary hypertension is mild to moderate. No evidence of pericardial effusion. 9. Questionable TIA (5/2015) with questionable PFO:              A.  TTE with bubble study: 6/2/2015:  Mild asymmetric septal hypertrophy, Normal left ventricle size and systolic function, Stage II diastolic dysfunction, Patent foramen ovale with right-to-left shunt with valsalva only was visualized, Trace mitral regurgitation, RVSP is 37 mmHg.              B. Per Dr. Cindy Casillas: (5/22/2015-office visit note)-\"I personally viewed the echo images myself now.  The images were poor & the atrial septum was not well seen. Jerrod Sos is not a definite ASD.  A bubble study was performed & was poor quality but the few bubbles that were there did not cross the atrial septum.  The septum is probably aneurysmal & & thus may have a PFO but again the visualization was too poor.  She states that Neuro was still not anum that it was a TIA. \"-Dr. Cindy Casillas recommended a repeat JIMENA or cardiac MRI-patient reported \"I didn't want the testing\". 9. Stress test 8/2008: No stress induced ischemia, EF 54%  10. Probable SAM: scheduled for sleep study 4/13/2018 in outpatient. 11. COPD with chronic home O2 (4L NC): follows with Dr. Radhames Acuña. 12. Chronic Lymphedema   13. Super morbid Obese: BMI 50.3. 14. History of anal fissure s/p surgery at [facility] (further details unknown)  15. History of occipital neuralgia   16. Gait instability: uses a walker. 17. Remote tobacco abuse   18.  Anxiety/Depression       Past Medical History:   Diagnosis Date    (HFpEF) heart failure with preserved ejection fraction [facility]     1/16/2018- echo- LVEF 55-65%    Anal fissure     Anemia 10/6/2017    Anxiety and depression     Arthritis     Asthma     Atrial fibrillation (HCC)     Eliquis    Blood transfusion     long time no reaction    CHF (congestive heart failure) (HCC)     Diastolic    COPD (chronic obstructive pulmonary disease) (HCC)     Disc disorder     DM2 (diabetes mellitus, type 2) ([facility]     on insulin    Fall due to stumbling 10/6/2017    GERD (gastroesophageal reflux disease)     Hx of blood clots     Hyperlipidemia     Hypertension     Inappropriate sinus tachycardia     LVH (left ventricular hypertrophy)     moderate    Lymphadenitis, chronic 4/13/2018    Occipital neuralgia 11/2/2011    Respiratory acidosis 10/7/2017    Sinus tachycardia glucose monitoring kit (FREESTYLE) monitoring kit Use once. 1 kit 0    traZODone (DESYREL) 50 MG tablet TAKE 1 TABLET BY MOUTH EVERY DAY AT BEDTIME AS NEEDED FOR SLEEP 30 tablet 5    ipratropium-albuterol (DUONEB) 0.5-2.5 (3) MG/3ML SOLN nebulizer solution USE 1 VIAL VIA NEBULIZER EVERY 4 HOURS AS NEEDED FOR SHORTNESS OF BREATH 360 mL 2    Insulin Pen Needle 30G X 8 MM MISC 1 each by Does not apply route daily 100 each 5    blood glucose monitor strips Test 4 times a day & as needed for symptoms of irregular blood glucose. 100 strip 0    ULTICARE ALCOHOL SWABS 70 % PADS USE 3 TIMES A DAY  5    BD INSULIN SYRINGE U/F 31G X 5/16\" 1 ML MISC USE AS DIRECTED  0    acetaminophen (TYLENOL) 325 MG tablet Take 650 mg by mouth every 6 hours as needed for Pain      glucose (GLUTOSE) 40 % GEL Take 37.5 mLs by mouth as needed (hypoglycemia) 45 g 0    OXYGEN Inhale 4 L into the lungs continuous       Elastic Bandages & Supports MISC 20-30 mmHg bilateral compression stockings  Size to fit  Dx:  Edema  Knee high 2 each 0       Review of Systems:   Cardiac: As per HPI  General: No fever, chills, rigors  Pulmonary: As per HPI  HEENT: No visual disturbances, difficult swallowing  GI: No nausea, vomiting, abdominal pain  : No dysuria or hematuria  Endocrine: No thyroid disease or diabetes  Musculoskeletal: MEHTA x 4, no focal motor deficits  Skin: Intact, no rashes  Neuro/Psych: No headache or seizures        Weights: Wt Readings from Last 3 Encounters:   09/03/20 290 lb (131.5 kg)   08/27/20 280 lb (127 kg)   08/20/20 296 lb 3.2 oz (134.4 kg)         Physical Examination:     /64   Pulse 80   Wt 290 lb (131.5 kg)   LMP  (LMP Unknown)   SpO2 99% Comment: on 4 liters O2  BMI 51.37 kg/m²      CONSTITUTIONAL: Alert and oriented times 3, no acute distress and cooperative to examination with proper mood and affect. SKIN: Skin color, texture, turgor normal. No rashes or lesions.   LYMPH: no cervical nodes, no inguinal nodes  HEENT: Head is normocephalic, atraumatic. EOMI, PERRLA. NECK: Supple, symmetrical, trachea midline, no adenopathy, thyroid symmetric, not enlarged and no tenderness, skin normal.  CHEST/LUNGS: chest symmetric with normal A/P diameter, normal respiratory rate and rhythm, lungs clear to auscultation without wheezes, rales or rhonchi. No accessory muscle use. Scars None   CARDIOVASCULAR: Heart sounds are normal.  Regular rate and rhythm without murmur, gallop or rub. Normal S1 and S2. . Carotid and femoral pulses 2+/4 and equal bilaterally. ABDOMEN: Morbid obese. No and Laparoscopic scar(s) present. Normal bowel sounds. No bruits. soft, nondistended, no masses or organomegaly. no evidence of hernia. Percussion: Normal without hepatosplenomegally. Tenderness: absent. RECTAL: deferred, not clinically indicated  NEUROLOGIC: There are no focalizing motor or sensory deficits. CN II-XII are grossly intact. [place] EXTREMITIES: no cyanosis, no clubbing. 2-3+ chronic lower extremity lymphedema. Warm and well perfused. All the following diagnostics were personally reviewed and interpreted by me. LAB DATA:     8/20/2020 06:45 8/27/2020 12:00   Sodium 136 135   Potassium 4.3 4.4   Chloride 88 (L) 91 (L)   CO2 34 (H) 31 (H)   BUN 43 (H) 16   Creatinine 1.2 (H) 0.7   Anion Gap 14 13   GFR Non- 45 >60   GFR African American 54 >60   Glucose 181 (H) 535 (HH)   Calcium 9.6 9.7   Pro-BNP  1,005 (H)      5/26/2020 12:00 8/19/2020 13:57   Hemoglobin A1C 17.1 (H) 9.8 (H)         DIAGNOSTICS:    CXR (8/18/2020)  FINDINGS:   Unchanged size of the cardiomediastinal silhouette. Bibasilar   predominant airspace disease. No effusion or pneumothorax. Bones are unchanged. Impression   Bibasilar airspace disease. Findings likely represent mild pulmonary   edema but infection and/or aspiration cannot be excluded in the right clinical setting.        CARDIAC TESTING:      Stress (8/14/2008)      [facility]   Ejection fraction is visually estimated at 55-65%.   Stage II diastolic dysfunction.   The left atrium is severely dilated.   Elevated L atrial pressure   Mild mitral regurgitation   The aortic valve appears mildly sclerotic.   Pulmonary hypertension is mild to moderate .   No evidence of pericardial effusion. EKG  Sinus rhythm       ASSESSMENT:  1. Chronic HFpEF  2. ACC stage C. NYHA FC III  3. Improving hypervolemia with chronic lymphedema  4. PAF - sinus rhythm today  5. Chronic anticoagulation with Eliquis  6. Hypertension - controlled  7. Hyperlipidemia  8. Diabetes Mellitus - uncontrolled   9. Morbid obesity  10. Hx of TIA  11. COPD with ltot  12. Former tobacco use  13. Probable SAM  14. Iron deficiency       PLAN:  1. Continue current cardiac medications. 2. Referral for sleep study     3. Follow up with CHF infusion clinic in one week    4. Follow up with Dr. Corwin Cedeño in one month    5. Weigh yourself daily    -Stay Hydrated    -Diet should sodium restricted to 2 grams    -Again watch your daily weight trends and if you gain water weight please follow below instructions.    -If you gain 3-5 pounds in 2-3 days OR notice that you are retaining fluid in anyway just like you did before then take an extra dose of your water pill ( bumetanide/Bumex) every day until you lose the weight or feel better.     -If you notice that you have taken more than 3 extra doses in 1 week then please call and let us know. -If at any time you feel that you are retaining fluid, your medications are not working, or you feel ill in anyway, then please call us for either same day appointment or the next day, and for instructions. Our goal is to keep you out of the emergency room and the hospital and we have ways to do it.  You just need to call us in a timely manner.     -If you become sick for other reasons, and notice that you are not urinating as much, the urine is

## 2020-09-14 ENCOUNTER — TELEPHONE (OUTPATIENT)
Dept: FAMILY MEDICINE CLINIC | Age: 65
End: 2020-09-14

## 2020-09-16 RX ORDER — FLUCONAZOLE 150 MG/1
150 TABLET ORAL ONCE
Qty: 2 TABLET | Refills: 0 | Status: SHIPPED | OUTPATIENT
Start: 2020-09-16 | End: 2020-09-16

## 2020-09-21 ENCOUNTER — TELEPHONE (OUTPATIENT)
Dept: FAMILY MEDICINE CLINIC | Age: 65
End: 2020-09-21

## 2020-09-22 ENCOUNTER — TELEPHONE (OUTPATIENT)
Dept: FAMILY MEDICINE CLINIC | Age: 65
End: 2020-09-22

## 2020-09-22 RX ORDER — GABAPENTIN 300 MG/1
300 CAPSULE ORAL 3 TIMES DAILY
Qty: 270 CAPSULE | Refills: 0 | Status: SHIPPED
Start: 2020-09-22 | End: 2020-11-17

## 2020-09-22 RX ORDER — DILTIAZEM HYDROCHLORIDE 240 MG/1
CAPSULE, COATED, EXTENDED RELEASE ORAL
Qty: 60 CAPSULE | Refills: 2 | Status: SHIPPED
Start: 2020-09-22 | End: 2020-11-17

## 2020-09-22 RX ORDER — BUMETANIDE 2 MG/1
TABLET ORAL
Qty: 60 TABLET | Refills: 2 | Status: SHIPPED
Start: 2020-09-22 | End: 2020-11-17

## 2020-09-29 ENCOUNTER — TELEPHONE (OUTPATIENT)
Dept: FAMILY MEDICINE CLINIC | Age: 65
End: 2020-09-29

## 2020-09-30 ENCOUNTER — OFFICE VISIT (OUTPATIENT)
Dept: FAMILY MEDICINE CLINIC | Age: 65
End: 2020-09-30
Payer: COMMERCIAL

## 2020-09-30 VITALS
HEART RATE: 94 BPM | DIASTOLIC BLOOD PRESSURE: 86 MMHG | BODY MASS INDEX: 49.61 KG/M2 | OXYGEN SATURATION: 99 % | RESPIRATION RATE: 24 BRPM | WEIGHT: 280 LBS | TEMPERATURE: 97.9 F | SYSTOLIC BLOOD PRESSURE: 138 MMHG | HEIGHT: 63 IN

## 2020-09-30 PROCEDURE — 3046F HEMOGLOBIN A1C LEVEL >9.0%: CPT | Performed by: FAMILY MEDICINE

## 2020-09-30 PROCEDURE — 3017F COLORECTAL CA SCREEN DOC REV: CPT | Performed by: FAMILY MEDICINE

## 2020-09-30 PROCEDURE — 99215 OFFICE O/P EST HI 40 MIN: CPT | Performed by: FAMILY MEDICINE

## 2020-09-30 PROCEDURE — G8427 DOCREV CUR MEDS BY ELIG CLIN: HCPCS | Performed by: FAMILY MEDICINE

## 2020-09-30 PROCEDURE — 4040F PNEUMOC VAC/ADMIN/RCVD: CPT | Performed by: FAMILY MEDICINE

## 2020-09-30 PROCEDURE — G8417 CALC BMI ABV UP PARAM F/U: HCPCS | Performed by: FAMILY MEDICINE

## 2020-09-30 PROCEDURE — G8400 PT W/DXA NO RESULTS DOC: HCPCS | Performed by: FAMILY MEDICINE

## 2020-09-30 PROCEDURE — 1036F TOBACCO NON-USER: CPT | Performed by: FAMILY MEDICINE

## 2020-09-30 PROCEDURE — 1090F PRES/ABSN URINE INCON ASSESS: CPT | Performed by: FAMILY MEDICINE

## 2020-09-30 PROCEDURE — 1123F ACP DISCUSS/DSCN MKR DOCD: CPT | Performed by: FAMILY MEDICINE

## 2020-09-30 PROCEDURE — 2022F DILAT RTA XM EVC RTNOPTHY: CPT | Performed by: FAMILY MEDICINE

## 2020-09-30 RX ORDER — INSULIN HUMAN 500 [IU]/ML
INJECTION, SOLUTION SUBCUTANEOUS
COMMUNITY
Start: 2020-08-27 | End: 2020-09-30

## 2020-09-30 RX ORDER — IPRATROPIUM BROMIDE AND ALBUTEROL SULFATE 2.5; .5 MG/3ML; MG/3ML
1 SOLUTION RESPIRATORY (INHALATION) EVERY 6 HOURS PRN
Qty: 360 ML | Refills: 2 | Status: ON HOLD
Start: 2020-09-30 | End: 2021-05-05 | Stop reason: HOSPADM

## 2020-09-30 RX ORDER — NYSTATIN 100000 [USP'U]/G
POWDER TOPICAL 3 TIMES DAILY PRN
Qty: 1 BOTTLE | Refills: 3 | Status: ON HOLD
Start: 2020-09-30 | End: 2020-11-26 | Stop reason: HOSPADM

## 2020-09-30 NOTE — PROGRESS NOTES
Deb Rockwell  : 1955    Chief Complaint:     Chief Complaint   Patient presents with    Diabetes       HPI  Deb Rockwell 75 y.o. presents for   Chief Complaint   Patient presents with    Diabetes     Patient presents for follow up. She still has not been using any insulin as she states she does not know how. However we confirmed with pharmacy that a pharmacist went to her house and did show her how to use the [place]. Pharmacist then called her 5 days later to see how she was doing and she states that she did not understand exactly what the pharmacist had even though she did confirm that she knew how to do it the day she was taught. We then confirmed that she did  vials at the pharmacy however patient states she does not have any vials at home. The pharmacist did state that he did drop these off to her and she does have these vials at home. Again we had a long discussion about compliance with medications and the importance of following up with appointments. She has not been taking her medications as prescribed and she states her sugars have been running in 300s. She missed multiple appointments due to transportation issues. She also has refused and/or did not answer home care multiple times. I do think she would be better fit with a physician that can come out to her house. We did have a long discussion about this today. She also came here without any oxygen today. She states she did not have any at home. I did explain that she needs to call the company and get portable oxygen if she is going to be going out places. As soon as she came into the office her oxygen saturation was 87%. She needed 4 L. We did have to call the company for her. Again we had a long discussion about taking responsibility for this and the importance of always having oxygen while she is out and about. She understands.   She states that [place] still coming out to her house for lymphedema therapy however she has no wraps on her legs today. She states that she told her  to take her wraps off. We again had a long discussion about the portance of keeping these wraps on. All questions were answered to patients satisfaction. Past Medical History:   Diagnosis Date    (HFpEF) heart failure with preserved ejection fraction ([facility]     1/16/2018- echo- LVEF 55-65%    Anal fissure     Anemia 10/6/2017    Anxiety and depression     Arthritis     Asthma     Atrial fibrillation (HCC)     Eliquis    Blood transfusion     long time no reaction    CHF (congestive heart failure) (HCC)     Diastolic    COPD (chronic obstructive pulmonary disease) (HCC)     Disc disorder     DM2 (diabetes mellitus, type 2) ([facility]     on insulin    Fall due to stumbling 10/6/2017    GERD (gastroesophageal reflux disease)     Hx of blood clots     Hyperlipidemia     Hypertension     Inappropriate sinus tachycardia     LVH (left ventricular hypertrophy)     moderate    Lymphadenitis, chronic 4/13/2018    Occipital neuralgia 11/2/2011    Respiratory acidosis 10/7/2017    Sinus tachycardia 2/16/2015       Past Surgical History:   Procedure Laterality Date    ANOSCOPY  10/25/2006    injection of anal sphincter with Botox, Franklyn Ortiz/Timmy, [facility]    ANOSCOPY  4/9/2007    injection of anal sphincter with Botox, Dr. Payton Fragoso, [address] [place] [address] ANOSCOPY  6/13/2007    injection of anal sphincter with Botox, Franklyn Rucker [facility]    ANUS SURGERY  4/4/2006    Lateral sphincterotomy for chronic anal fissure, Dr. Dallas Villarreal, [facility]  4/18/2012    anal exam and injection of Botox 100 units into anal sphincter, Laila Rucker, [facility]   Tracey [address]    COLONOSCOPY  1/20/2004    cecal polyp, bx (no pathologic changes), Dr. Elizabet Andrew, [address] [place] [address] COLONOSCOPY  8/11/2006    snare polypectomy terminal ileum polyp (granulation tissue polyp) and anal polyp (inflammatory/papilla), Dr. Meryl Robb, [facility] COLONOSCOPY  3/23/2012    bx/cauterization proximal ascending colon polyp, injection of anal sphincter with Botox, Dr. Meryl Robb, [facility]  2003    [address] [facility], SKIN, SUB-Q TISSUE,MUSCLE,=<20 SQ CM Left 11/30/2018    LEFT LEG EXCISIONAL  DEBRIDEMENT WITH TISSUE BIOPSY performed by Jimbo Louis DPM at [address] ENDOSCOPY  1/20/2004    gastritis, bx (no H pylori), Dr. Kishor Dexter, [facility]    UPPER GASTROINTESTINAL ENDOSCOPY N/A 6/1/2018    EGD BIOPSY performed by Fabricio Munoz MD at [facility] N/A 11/9/2018    EGD BIOPSY performed by Fabricio Munoz MD at [address] History     Socioeconomic History    Marital status:      Spouse name: None    Number of children: 3    Years of education: 9    Highest education level: 9th grade   Occupational History    Occupation: SSD   Social Needs    Financial resource strain: Not hard at all   [employer] insecurity     Worry: Never true     Inability: Never true   [email or web address] needs     Medical: Yes     Non-medical: No   Tobacco Use    Smoking status: Former Smoker     Packs/day: 1.50     Years: 25.00     Pack years: 37.50     Types: Cigarettes     Start date: 11/27/1979     Last attempt to quit: 12/18/2004     Years since quitting: 15.7    Smokeless tobacco: Never Used    Tobacco comment: Stopped smoking 16 years ago   Substance and Sexual Activity    Alcohol use: No     Frequency: Never     Comment: caffeine: no use    Drug use: No    Sexual activity: Not Currently     Partners: Male   Lifestyle    Physical activity     Days per week: 0 days     Minutes per session: 0 min    Stress: Not at all   Relationships    Social connections     Talks on phone: More than three times a week     Gets together: More than three times a week     Attends Orthodox service: Never     Active member of club or organization: No     Attends meetings of clubs or organizations: Never     Relationship status:     Intimate partner violence     Fear of current or ex partner: None     Emotionally abused: None     Physically abused: None     Forced sexual activity: None   Other Topics Concern    None   Social History Narrative    Denies caffeine. Grandson shops for her and helps around the house    Patient unable to exercise d/t mobility status     Patient lives with her  who has limited speech ability d/t hx of CVA. He is able to assist patient with IADLS       Family History   Problem Relation Age of Onset    Heart Disease Mother     Diabetes Father     Colon Cancer Father     Other Father         BLINDNESS    Colon Cancer Sister     Diabetes Paternal Aunt     Diabetes Paternal Uncle     Diabetes Paternal Aunt     Diabetes Paternal Uncle           Current Outpatient Medications   Medication Sig Dispense Refill    ipratropium-albuterol (DUONEB) 0.5-2.5 (3) MG/3ML SOLN nebulizer solution Take 3 mLs by nebulization every 6 hours as needed for Shortness of Breath 360 mL 2    nystatin (MYCOSTATIN) 983212 UNIT/GM powder Apply topically 3 times daily as needed (rash) 1 Bottle 3    bumetanide (BUMEX) 2 MG tablet TAKE 1 TABLET BY MOUTH TWICE A DAY 60 tablet 2    dilTIAZem (CARDIZEM CD) 240 MG extended release capsule TAKE 1 CAPSULE BY MOUTH TWICE A DAY 60 capsule 2    gabapentin (NEURONTIN) 300 MG capsule Take 1 capsule by mouth 3 times daily for 30 days.  270 capsule 0    omega-3 acid ethyl esters (LOVAZA) 1 g capsule Take 2 capsules by mouth 2 times daily 60 capsule 3    insulin lispro (HUMALOG) 100 UNIT/ML injection vial 4 times daily (before meals and nightly)  **High Dose Corrective Algorithm**  Glucose: Dose:               No Insulin  140-199           5 Units  200-249 10 Units  250-299 15 Units  300-349 20 Units  350-400 25 Units  Over 400  30 Units 10 vial 3    spironolactone (ALDACTONE) 25 MG tablet Take 1 tablet by mouth daily 30 tablet 5    montelukast (SINGULAIR) 10 MG tablet Take 1 tablet by mouth nightly 90 tablet 1    lansoprazole (PREVACID) 30 MG delayed release capsule Take 1 capsule by mouth daily 90 capsule 1    blood glucose test strips (ASCENSIA AUTODISC VI;ONE TOUCH ULTRA TEST VI) strip Use 4 times daily 100 strip 5    albuterol sulfate  (90 Base) MCG/ACT inhaler Inhale 2 puffs into the lungs 4 times daily as needed for Wheezing 1 Inhaler 5    metoprolol succinate (TOPROL XL) 100 MG extended release tablet Take 1 tablet by mouth daily 90 tablet 1    ferrous sulfate (IRON 325) 325 (65 Fe) MG tablet Take 1 tablet by mouth daily (with breakfast) 90 tablet 1    escitalopram (LEXAPRO) 20 MG tablet TAKE 1 TABLET BY MOUTH DAILY 90 tablet 1    apixaban (ELIQUIS) 5 MG TABS tablet TAKE 1 TABLET BY MOUTH TWICE A DAY 60 tablet 5    Insulin Pen Needle ([employer] PEN NEEDLES 31G) 31G X 8 MM MISC 1 each by Does not apply route daily 100 each 0    Magic Mouthwash (MIRACLE MOUTHWASH) Swish and spit 5 mLs 4 times daily as needed for Irritation 1 Bottle 0    glucose monitoring kit (FREESTYLE) monitoring kit Use once. 1 kit 0    traZODone (DESYREL) 50 MG tablet TAKE 1 TABLET BY MOUTH EVERY DAY AT BEDTIME AS NEEDED FOR SLEEP 30 tablet 5    Insulin Pen Needle 30G X 8 MM MISC 1 each by Does not apply route daily 100 each 5    blood glucose monitor strips Test 4 times a day & as needed for symptoms of irregular blood glucose.  100 strip 0    vitamin D (ERGOCALCIFEROL) 1.25 MG (06822 UT) CAPS capsule TAKE 1 CAPSULE BY MOUTH TWICE WEEKLY 8 capsule 2    ULTICARE ALCOHOL SWABS 70 % PADS USE 3 TIMES A DAY  5    BD INSULIN SYRINGE U/F 31G X 5/16\" 1 ML MISC USE AS DIRECTED  0    acetaminophen (TYLENOL) 325 MG tablet Take 650 mg by mouth every 6 hours as needed for Pain      glucose (GLUTOSE) 40 % GEL Take 37.5 mLs by mouth as needed (hypoglycemia) 45 g 0    OXYGEN Inhale 4 L into the lungs continuous       Elastic Bandages & Supports MISC 20-30 mmHg bilateral compression stockings  Size to fit  Dx:  Edema  Knee high 2 each 0     No current facility-administered medications for this visit. Allergies   Allergen Reactions    Statins Other (See Comments)     Muscle pains       Health Maintenance Due   Topic Date Due    Statin Therapy  1955    DTaP/Tdap/Td vaccine (1 - Tdap) 12/02/1972    DEXA (modify frequency per FRAX score)  12/02/2008    Shingles Vaccine (2 of 3) 02/27/2015    Colon cancer screen colonoscopy  03/23/2015    Diabetic retinal exam  04/27/2018    Breast cancer screen  11/04/2018    Diabetic foot exam  05/16/2019    Diabetic microalbuminuria test  05/16/2019    Flu vaccine (1) 09/01/2020    Pneumococcal 65+ years Vaccine (1 of 1 - PPSV23) 09/04/2020           REVIEW OF SYSTEMS  Review of Systems   Constitutional: Positive for fatigue. Negative for chills and fever. HENT: Negative for congestion, postnasal drip, sinus pressure, sinus pain and sore throat. Eyes: Negative for pain. Respiratory: Negative for cough and shortness of breath. Cardiovascular: Positive for leg swelling. Negative for chest pain. Gastrointestinal: Negative for abdominal pain, blood in stool, constipation, diarrhea, nausea and vomiting. Endocrine: Negative for cold intolerance and heat intolerance. Genitourinary: Negative for difficulty urinating, dysuria, frequency and menstrual problem. Musculoskeletal: Positive for arthralgias, back pain and neck pain. Negative for myalgias. Skin: Negative for color change and wound. Allergic/Immunologic: Positive for environmental allergies. Neurological: Negative for dizziness, weakness, light-headedness, numbness and headaches. Hematological: Negative for adenopathy. Psychiatric/Behavioral: Negative for behavioral problems, dysphoric mood and sleep disturbance.  The patient is not nervous/anxious. PHYSICAL EXAM  /86   Pulse 94   Temp 97.9 °F (36.6 °C)   Resp 24   Ht 5' 3\" (1.6 m)   Wt 280 lb (127 kg)   LMP  (LMP Unknown)   SpO2 99%   BMI 49.60 kg/m²   Physical Exam  Vitals signs and nursing note reviewed. Constitutional:       General: She is not in acute distress. Appearance: She is well-developed. She is obese. Comments: Uses walker but in wheelchair currnetly   HENT:      Head: Normocephalic and atraumatic. Right Ear: External ear normal.      Left Ear: External ear normal.      Nose: Nose normal.      Comments: Nasal cannula in place  Eyes:      General: No scleral icterus. Conjunctiva/sclera: Conjunctivae normal.   Neck:      Musculoskeletal: Neck supple. Thyroid: No thyromegaly. Cardiovascular:      Rate and Rhythm: Normal rate and regular rhythm. Heart sounds: Murmur (RAMÓN) present. Pulmonary:      Effort: Pulmonary effort is normal. No respiratory distress. Breath sounds: No wheezing. Abdominal:      Palpations: Abdomen is soft. Tenderness: There is no abdominal tenderness. Genitourinary:     Vagina: Normal.   Musculoskeletal: Normal range of motion. Comments: Edema noted bilaterally up to the thigh   Lymphadenopathy:      Cervical: No cervical adenopathy. Skin:     General: Skin is warm and dry. Comments: No wrapping on legs legs are edematous and hard to touch   Neurological:      Mental Status: She is alert and oriented to person, place, and time. Psychiatric:         Behavior: Behavior normal.         Thought Content: Thought content normal.         Judgment: Judgment normal.              Laboratory:   All laboratory and radiology results have been personally reviewed by myself    Lab Results   Component Value Date     08/27/2020    K 4.4 08/27/2020    K 4.3 08/20/2020    CL 91 08/27/2020    CO2 31 08/27/2020    BUN 16 08/27/2020    CREATININE 0.7 08/27/2020    PROT 7.1 05/28/2020    LABALBU 3.4 refuses. She states that she does not need help at home though she does not know how to use insulin and I do think she would benefit from home care and/or possibly placement into assisted living. She does have an appointment tomorrow with endocrinology and appointment on Monday with cardiology. I did stress the importance of going to these appointments. I did explain the patient that she does have vials at home of insulin that she needs to start taking she also needs to start checking her sugars on a daily basis. She cannot be taking wraps off for her lymphedema. She also states she is out of gabapentin though she had a 90-day supply given to her at the end of July and she is not due till the end of October. She states that her legs are in pain but she should not be out of her medication yet. I did explain that we cannot give her more medication as she should not need more at this time. We also stressed the importance of never leaving the home without oxygen responsibility of the knees for oxygen is agreeable. More than 45 minutes was spent with the patient during the encounter, during which more than half the time was spent counseling on the above concerns    Problem list reviewed andsimplified/updated  HM reviewed today and counseled as appropriate    Call or go to ED immediately if symptoms worsen or persist.  Future Appointments   Date Time Provider Jason Muir   10/1/2020  2:20 PM MD Manfred España   10/5/2020  1:00 PM Andreas Doty DO [facility]     Or sooner if necessary.       Educational materials and/or homeexercises printed for patient's review and were included in patient instructions on his/her After Visit Summary and given to patient at the end of visit.       Counseled regarding above diagnosis, including possible risks and complications,  especially if left uncontrolled.     Counseled regarding the possible side effects, risks, benefits and alternatives

## 2020-10-01 ASSESSMENT — ENCOUNTER SYMPTOMS
COLOR CHANGE: 0
EYE PAIN: 0
BACK PAIN: 1
SORE THROAT: 0
SHORTNESS OF BREATH: 0
ABDOMINAL PAIN: 0
SINUS PAIN: 0
SINUS PRESSURE: 0
CONSTIPATION: 0
DIARRHEA: 0
NAUSEA: 0
COUGH: 0
VOMITING: 0
BLOOD IN STOOL: 0

## 2020-10-20 ENCOUNTER — TELEPHONE (OUTPATIENT)
Dept: CARDIOLOGY CLINIC | Age: 65
End: 2020-10-20

## 2020-10-28 ENCOUNTER — TELEPHONE (OUTPATIENT)
Dept: FAMILY MEDICINE CLINIC | Age: 65
End: 2020-10-28

## 2020-10-28 NOTE — TELEPHONE ENCOUNTER
Called patient in regards to orders that were faxed here for home care for wound supplies. We have not seen patient for wound in past, unsure of what wound she has, where it is located or any information. Pt was discharged from department on 06/8/9837 per policy we will supply care to the patient for 30 days. Left message to find out more information about wound (& to see who ordered wound care), also to see if she established with visiting physicians.

## 2020-11-17 ENCOUNTER — HOSPITAL ENCOUNTER (INPATIENT)
Age: 65
LOS: 2 days | Discharge: HOME | DRG: 420 | End: 2020-11-26
Attending: EMERGENCY MEDICINE | Admitting: INTERNAL MEDICINE
Payer: COMMERCIAL

## 2020-11-17 PROBLEM — R73.9 HYPERGLYCEMIA: Status: ACTIVE | Noted: 2020-11-17

## 2020-11-17 LAB
ALBUMIN SERPL-MCNC: 3.4 G/DL (ref 3.5–5.2)
ALP BLD-CCNC: 108 U/L (ref 35–104)
ALT SERPL-CCNC: 7 U/L (ref 0–32)
ANION GAP SERPL CALCULATED.3IONS-SCNC: 10 MMOL/L (ref 7–16)
AST SERPL-CCNC: 8 U/L (ref 0–31)
BACTERIA: NORMAL /HPF
BASOPHILS ABSOLUTE: 0.05 E9/L (ref 0–0.2)
BASOPHILS RELATIVE PERCENT: 0.9 % (ref 0–2)
BETA-HYDROXYBUTYRATE: 1.73 MMOL/L (ref 0.02–0.27)
BILIRUB SERPL-MCNC: <0.2 MG/DL (ref 0–1.2)
BILIRUBIN URINE: NEGATIVE
BLOOD, URINE: ABNORMAL
BUN BLDV-MCNC: 13 MG/DL (ref 8–23)
CALCIUM SERPL-MCNC: 9 MG/DL (ref 8.6–10.2)
CHLORIDE BLD-SCNC: 92 MMOL/L (ref 98–107)
CHP ED QC CHECK: NORMAL
CLARITY: CLEAR
CO2: 30 MMOL/L (ref 22–29)
COLOR: YELLOW
CREAT SERPL-MCNC: 0.6 MG/DL (ref 0.5–1)
EOSINOPHILS ABSOLUTE: 0.11 E9/L (ref 0.05–0.5)
EOSINOPHILS RELATIVE PERCENT: 2 % (ref 0–6)
GFR AFRICAN AMERICAN: >60
GFR NON-AFRICAN AMERICAN: >60 ML/MIN/1.73
GLUCOSE BLD-MCNC: 486 MG/DL
GLUCOSE BLD-MCNC: 535 MG/DL (ref 74–99)
GLUCOSE URINE: >=1000 MG/DL
HBA1C MFR BLD: 15.7 % (ref 4–5.6)
HCT VFR BLD CALC: 29.9 % (ref 34–48)
HEMOGLOBIN: 9.5 G/DL (ref 11.5–15.5)
IMMATURE GRANULOCYTES #: 0.02 E9/L
IMMATURE GRANULOCYTES %: 0.4 % (ref 0–5)
KETONES, URINE: 15 MG/DL
LACTIC ACID: 0.9 MMOL/L (ref 0.5–2.2)
LEUKOCYTE ESTERASE, URINE: NEGATIVE
LIPASE: 21 U/L (ref 13–60)
LYMPHOCYTES ABSOLUTE: 1.11 E9/L (ref 1.5–4)
LYMPHOCYTES RELATIVE PERCENT: 20.4 % (ref 20–42)
MCH RBC QN AUTO: 25.5 PG (ref 26–35)
MCHC RBC AUTO-ENTMCNC: 31.8 % (ref 32–34.5)
MCV RBC AUTO: 80.2 FL (ref 80–99.9)
METER GLUCOSE: 367 MG/DL (ref 74–99)
METER GLUCOSE: 387 MG/DL (ref 74–99)
METER GLUCOSE: 486 MG/DL (ref 74–99)
MONOCYTES ABSOLUTE: 0.37 E9/L (ref 0.1–0.95)
MONOCYTES RELATIVE PERCENT: 6.8 % (ref 2–12)
NEUTROPHILS ABSOLUTE: 3.77 E9/L (ref 1.8–7.3)
NEUTROPHILS RELATIVE PERCENT: 69.5 % (ref 43–80)
NITRITE, URINE: NEGATIVE
PDW BLD-RTO: 14.5 FL (ref 11.5–15)
PH UA: 7 (ref 5–9)
PH VENOUS: 7.35 (ref 7.35–7.45)
PLATELET # BLD: 319 E9/L (ref 130–450)
PMV BLD AUTO: 9.4 FL (ref 7–12)
POTASSIUM REFLEX MAGNESIUM: 5 MMOL/L (ref 3.5–5)
PROTEIN UA: NEGATIVE MG/DL
RBC # BLD: 3.73 E12/L (ref 3.5–5.5)
RBC UA: NORMAL /HPF (ref 0–2)
SODIUM BLD-SCNC: 132 MMOL/L (ref 132–146)
SPECIFIC GRAVITY UA: 1.01 (ref 1–1.03)
TOTAL PROTEIN: 6.5 G/DL (ref 6.4–8.3)
UROBILINOGEN, URINE: 0.2 E.U./DL
WBC # BLD: 5.4 E9/L (ref 4.5–11.5)
WBC UA: NORMAL /HPF (ref 0–5)

## 2020-11-17 PROCEDURE — 83036 HEMOGLOBIN GLYCOSYLATED A1C: CPT

## 2020-11-17 PROCEDURE — 85025 COMPLETE CBC W/AUTO DIFF WBC: CPT

## 2020-11-17 PROCEDURE — 81001 URINALYSIS AUTO W/SCOPE: CPT

## 2020-11-17 PROCEDURE — 82800 BLOOD PH: CPT

## 2020-11-17 PROCEDURE — 2580000003 HC RX 258: Performed by: HOSPITALIST

## 2020-11-17 PROCEDURE — 6370000000 HC RX 637 (ALT 250 FOR IP): Performed by: STUDENT IN AN ORGANIZED HEALTH CARE EDUCATION/TRAINING PROGRAM

## 2020-11-17 PROCEDURE — 36415 COLL VENOUS BLD VENIPUNCTURE: CPT

## 2020-11-17 PROCEDURE — 80053 COMPREHEN METABOLIC PANEL: CPT

## 2020-11-17 PROCEDURE — 82010 KETONE BODYS QUAN: CPT

## 2020-11-17 PROCEDURE — 96374 THER/PROPH/DIAG INJ IV PUSH: CPT

## 2020-11-17 PROCEDURE — 2580000003 HC RX 258: Performed by: STUDENT IN AN ORGANIZED HEALTH CARE EDUCATION/TRAINING PROGRAM

## 2020-11-17 PROCEDURE — 83690 ASSAY OF LIPASE: CPT

## 2020-11-17 PROCEDURE — 83605 ASSAY OF LACTIC ACID: CPT

## 2020-11-17 PROCEDURE — G0378 HOSPITAL OBSERVATION PER HR: HCPCS

## 2020-11-17 PROCEDURE — 6370000000 HC RX 637 (ALT 250 FOR IP): Performed by: HOSPITALIST

## 2020-11-17 PROCEDURE — 82962 GLUCOSE BLOOD TEST: CPT

## 2020-11-17 PROCEDURE — 99285 EMERGENCY DEPT VISIT HI MDM: CPT

## 2020-11-17 RX ORDER — ESCITALOPRAM OXALATE 10 MG/1
20 TABLET ORAL DAILY
Status: DISCONTINUED | OUTPATIENT
Start: 2020-11-17 | End: 2020-11-26 | Stop reason: HOSPADM

## 2020-11-17 RX ORDER — DEXTROSE MONOHYDRATE 50 MG/ML
100 INJECTION, SOLUTION INTRAVENOUS PRN
Status: DISCONTINUED | OUTPATIENT
Start: 2020-11-17 | End: 2020-11-26 | Stop reason: HOSPADM

## 2020-11-17 RX ORDER — ONDANSETRON 2 MG/ML
4 INJECTION INTRAMUSCULAR; INTRAVENOUS EVERY 6 HOURS PRN
Status: DISCONTINUED | OUTPATIENT
Start: 2020-11-17 | End: 2020-11-26 | Stop reason: HOSPADM

## 2020-11-17 RX ORDER — FERROUS SULFATE 325(65) MG
325 TABLET ORAL
Status: DISCONTINUED | OUTPATIENT
Start: 2020-11-18 | End: 2020-11-26 | Stop reason: HOSPADM

## 2020-11-17 RX ORDER — ALBUTEROL SULFATE 2.5 MG/3ML
2.5 SOLUTION RESPIRATORY (INHALATION) 4 TIMES DAILY PRN
Status: DISCONTINUED | OUTPATIENT
Start: 2020-11-17 | End: 2020-11-26 | Stop reason: HOSPADM

## 2020-11-17 RX ORDER — LANSOPRAZOLE 30 MG/1
30 CAPSULE, DELAYED RELEASE ORAL 2 TIMES DAILY
COMMUNITY
End: 2021-09-01

## 2020-11-17 RX ORDER — 0.9 % SODIUM CHLORIDE 0.9 %
1000 INTRAVENOUS SOLUTION INTRAVENOUS ONCE
Status: COMPLETED | OUTPATIENT
Start: 2020-11-17 | End: 2020-11-17

## 2020-11-17 RX ORDER — TRAZODONE HYDROCHLORIDE 50 MG/1
50 TABLET ORAL NIGHTLY
COMMUNITY
End: 2022-03-08 | Stop reason: ALTCHOICE

## 2020-11-17 RX ORDER — PROMETHAZINE HYDROCHLORIDE 25 MG/1
12.5 TABLET ORAL EVERY 6 HOURS PRN
Status: DISCONTINUED | OUTPATIENT
Start: 2020-11-17 | End: 2020-11-26 | Stop reason: HOSPADM

## 2020-11-17 RX ORDER — ERGOCALCIFEROL 1.25 MG/1
50000 CAPSULE ORAL WEEKLY
Status: ON HOLD | COMMUNITY
End: 2022-03-22 | Stop reason: HOSPADM

## 2020-11-17 RX ORDER — BISACODYL 10 MG
10 SUPPOSITORY, RECTAL RECTAL DAILY PRN
Status: DISCONTINUED | OUTPATIENT
Start: 2020-11-17 | End: 2020-11-26 | Stop reason: HOSPADM

## 2020-11-17 RX ORDER — PANTOPRAZOLE SODIUM 40 MG/1
40 TABLET, DELAYED RELEASE ORAL
Status: DISCONTINUED | OUTPATIENT
Start: 2020-11-18 | End: 2020-11-26 | Stop reason: HOSPADM

## 2020-11-17 RX ORDER — SPIRONOLACTONE 25 MG/1
25 TABLET ORAL DAILY
Status: DISCONTINUED | OUTPATIENT
Start: 2020-11-18 | End: 2020-11-26 | Stop reason: HOSPADM

## 2020-11-17 RX ORDER — SODIUM CHLORIDE 0.9 % (FLUSH) 0.9 %
10 SYRINGE (ML) INJECTION EVERY 12 HOURS SCHEDULED
Status: DISCONTINUED | OUTPATIENT
Start: 2020-11-17 | End: 2020-11-26 | Stop reason: HOSPADM

## 2020-11-17 RX ORDER — BUMETANIDE 1 MG/1
2 TABLET ORAL 2 TIMES DAILY
Status: DISCONTINUED | OUTPATIENT
Start: 2020-11-17 | End: 2020-11-26 | Stop reason: HOSPADM

## 2020-11-17 RX ORDER — METOPROLOL SUCCINATE 50 MG/1
100 TABLET, EXTENDED RELEASE ORAL DAILY
Status: DISCONTINUED | OUTPATIENT
Start: 2020-11-18 | End: 2020-11-26 | Stop reason: HOSPADM

## 2020-11-17 RX ORDER — MONTELUKAST SODIUM 10 MG/1
10 TABLET ORAL NIGHTLY
Status: DISCONTINUED | OUTPATIENT
Start: 2020-11-17 | End: 2020-11-26 | Stop reason: HOSPADM

## 2020-11-17 RX ORDER — TRAZODONE HYDROCHLORIDE 50 MG/1
50 TABLET ORAL NIGHTLY PRN
Status: DISCONTINUED | OUTPATIENT
Start: 2020-11-17 | End: 2020-11-26 | Stop reason: HOSPADM

## 2020-11-17 RX ORDER — TRAMADOL HYDROCHLORIDE 50 MG/1
100 TABLET ORAL EVERY 6 HOURS PRN
Status: DISCONTINUED | OUTPATIENT
Start: 2020-11-17 | End: 2020-11-26 | Stop reason: HOSPADM

## 2020-11-17 RX ORDER — OMEGA-3-ACID ETHYL ESTERS 1 G/1
2 CAPSULE, LIQUID FILLED ORAL 2 TIMES DAILY
Status: DISCONTINUED | OUTPATIENT
Start: 2020-11-17 | End: 2020-11-26 | Stop reason: HOSPADM

## 2020-11-17 RX ORDER — IPRATROPIUM BROMIDE AND ALBUTEROL SULFATE 2.5; .5 MG/3ML; MG/3ML
1 SOLUTION RESPIRATORY (INHALATION) EVERY 6 HOURS PRN
Status: DISCONTINUED | OUTPATIENT
Start: 2020-11-17 | End: 2020-11-26 | Stop reason: HOSPADM

## 2020-11-17 RX ORDER — ESCITALOPRAM OXALATE 20 MG/1
20 TABLET ORAL 2 TIMES DAILY
Status: ON HOLD | COMMUNITY
End: 2020-11-26 | Stop reason: HOSPADM

## 2020-11-17 RX ORDER — NICOTINE POLACRILEX 4 MG
15 LOZENGE BUCCAL PRN
Status: DISCONTINUED | OUTPATIENT
Start: 2020-11-17 | End: 2020-11-26 | Stop reason: HOSPADM

## 2020-11-17 RX ORDER — DILTIAZEM HYDROCHLORIDE 240 MG/1
240 CAPSULE, COATED, EXTENDED RELEASE ORAL DAILY
Status: DISCONTINUED | OUTPATIENT
Start: 2020-11-18 | End: 2020-11-26 | Stop reason: HOSPADM

## 2020-11-17 RX ORDER — GABAPENTIN 600 MG/1
200 TABLET ORAL 3 TIMES DAILY
COMMUNITY

## 2020-11-17 RX ORDER — POLYETHYLENE GLYCOL 3350 17 G/17G
17 POWDER, FOR SOLUTION ORAL DAILY PRN
Status: DISCONTINUED | OUTPATIENT
Start: 2020-11-17 | End: 2020-11-26 | Stop reason: HOSPADM

## 2020-11-17 RX ORDER — ACETAMINOPHEN 650 MG/1
650 SUPPOSITORY RECTAL EVERY 6 HOURS PRN
Status: DISCONTINUED | OUTPATIENT
Start: 2020-11-17 | End: 2020-11-26 | Stop reason: HOSPADM

## 2020-11-17 RX ORDER — SODIUM CHLORIDE 0.9 % (FLUSH) 0.9 %
10 SYRINGE (ML) INJECTION PRN
Status: DISCONTINUED | OUTPATIENT
Start: 2020-11-17 | End: 2020-11-26 | Stop reason: HOSPADM

## 2020-11-17 RX ORDER — INSULIN GLARGINE 100 [IU]/ML
20 INJECTION, SOLUTION SUBCUTANEOUS 2 TIMES DAILY
Status: ON HOLD | COMMUNITY
End: 2020-11-26 | Stop reason: HOSPADM

## 2020-11-17 RX ORDER — TRAMADOL HYDROCHLORIDE 50 MG/1
50 TABLET ORAL EVERY 6 HOURS PRN
Status: DISCONTINUED | OUTPATIENT
Start: 2020-11-17 | End: 2020-11-26 | Stop reason: HOSPADM

## 2020-11-17 RX ORDER — DILTIAZEM HYDROCHLORIDE 240 MG/1
240 CAPSULE, EXTENDED RELEASE ORAL 2 TIMES DAILY
Status: ON HOLD | COMMUNITY
End: 2020-11-26 | Stop reason: HOSPADM

## 2020-11-17 RX ORDER — GABAPENTIN 300 MG/1
300 CAPSULE ORAL 3 TIMES DAILY
Status: DISCONTINUED | OUTPATIENT
Start: 2020-11-17 | End: 2020-11-26 | Stop reason: HOSPADM

## 2020-11-17 RX ORDER — BUMETANIDE 2 MG/1
1 TABLET ORAL DAILY
COMMUNITY
End: 2022-10-27 | Stop reason: SDUPTHER

## 2020-11-17 RX ORDER — DEXTROSE MONOHYDRATE 25 G/50ML
12.5 INJECTION, SOLUTION INTRAVENOUS PRN
Status: DISCONTINUED | OUTPATIENT
Start: 2020-11-17 | End: 2020-11-26 | Stop reason: HOSPADM

## 2020-11-17 RX ORDER — INSULIN GLARGINE 100 [IU]/ML
0.25 INJECTION, SOLUTION SUBCUTANEOUS NIGHTLY
Status: DISCONTINUED | OUTPATIENT
Start: 2020-11-17 | End: 2020-11-19

## 2020-11-17 RX ORDER — ACETAMINOPHEN 325 MG/1
650 TABLET ORAL EVERY 6 HOURS PRN
Status: DISCONTINUED | OUTPATIENT
Start: 2020-11-17 | End: 2020-11-26 | Stop reason: HOSPADM

## 2020-11-17 RX ADMIN — BUMETANIDE 2 MG: 1 TABLET ORAL at 19:56

## 2020-11-17 RX ADMIN — APIXABAN 5 MG: 5 TABLET, FILM COATED ORAL at 19:56

## 2020-11-17 RX ADMIN — INSULIN GLARGINE 32 UNITS: 100 INJECTION, SOLUTION SUBCUTANEOUS at 20:01

## 2020-11-17 RX ADMIN — INSULIN LISPRO 5 UNITS: 100 INJECTION, SOLUTION INTRAVENOUS; SUBCUTANEOUS at 20:01

## 2020-11-17 RX ADMIN — MONTELUKAST 10 MG: 10 TABLET, FILM COATED ORAL at 22:26

## 2020-11-17 RX ADMIN — TRAMADOL HYDROCHLORIDE 100 MG: 50 TABLET ORAL at 19:58

## 2020-11-17 RX ADMIN — INSULIN HUMAN 10 UNITS: 100 INJECTION, SOLUTION PARENTERAL at 15:41

## 2020-11-17 RX ADMIN — ACETAMINOPHEN 650 MG: 325 TABLET ORAL at 18:12

## 2020-11-17 RX ADMIN — Medication 10 ML: at 22:12

## 2020-11-17 RX ADMIN — GABAPENTIN 300 MG: 300 CAPSULE ORAL at 19:56

## 2020-11-17 RX ADMIN — SODIUM CHLORIDE 1000 ML: 9 INJECTION, SOLUTION INTRAVENOUS at 15:00

## 2020-11-17 RX ADMIN — SODIUM CHLORIDE 1000 ML: 9 INJECTION, SOLUTION INTRAVENOUS at 16:01

## 2020-11-17 RX ADMIN — OMEGA-3-ACID ETHYL ESTERS 2 G: 1 CAPSULE, LIQUID FILLED ORAL at 22:26

## 2020-11-17 ASSESSMENT — ENCOUNTER SYMPTOMS
ABDOMINAL DISTENTION: 0
COUGH: 0
DIARRHEA: 0
EYE REDNESS: 0
BACK PAIN: 0
EYE DISCHARGE: 0
EYE PAIN: 0
VOMITING: 0
ABDOMINAL PAIN: 0
SINUS PRESSURE: 0
NAUSEA: 0
SHORTNESS OF BREATH: 0
SORE THROAT: 0
WHEEZING: 0

## 2020-11-17 ASSESSMENT — PAIN SCALES - GENERAL
PAINLEVEL_OUTOF10: 5
PAINLEVEL_OUTOF10: 7
PAINLEVEL_OUTOF10: 10
PAINLEVEL_OUTOF10: 3

## 2020-11-17 ASSESSMENT — PAIN DESCRIPTION - LOCATION: LOCATION: FOOT

## 2020-11-17 ASSESSMENT — PAIN DESCRIPTION - PROGRESSION: CLINICAL_PROGRESSION: NOT CHANGED

## 2020-11-17 ASSESSMENT — PAIN DESCRIPTION - ONSET: ONSET: ON-GOING

## 2020-11-17 ASSESSMENT — PAIN DESCRIPTION - FREQUENCY: FREQUENCY: CONTINUOUS

## 2020-11-17 ASSESSMENT — PAIN DESCRIPTION - PAIN TYPE
TYPE: ACUTE PAIN
TYPE: ACUTE PAIN

## 2020-11-17 ASSESSMENT — PAIN DESCRIPTION - DESCRIPTORS: DESCRIPTORS: ACHING;DISCOMFORT;SORE

## 2020-11-17 ASSESSMENT — PAIN - FUNCTIONAL ASSESSMENT: PAIN_FUNCTIONAL_ASSESSMENT: PREVENTS OR INTERFERES SOME ACTIVE ACTIVITIES AND ADLS

## 2020-11-17 ASSESSMENT — PAIN DESCRIPTION - ORIENTATION: ORIENTATION: RIGHT;LEFT

## 2020-11-17 NOTE — ED NOTES
Bed: 07  Expected date:   Expected time:   Means of arrival:   Comments:  [address], RN  11/17/20 6820

## 2020-11-17 NOTE — ED PROVIDER NOTES
Chief Complaint   Patient presents with    Rectal Pain     since yesterday, states no history of this before. Patient is a 41-year-old female presents today for perirectal pain. Patient states for the past several days she has been having difficulty making it to the bathroom before she has a bowel movement or urinates, and she has been sitting in her own stools and urine, is having skin irritation. She denies history of previous skin infections or abscesses. She states she lives at home by herself, she is very weak and fatigued, and is having difficulty ambulating on her own. She lives with her  who helps take care of her. She has chronic lymphedema and states she is more tired and fatigued than usual, and is having difficulty take care of herself. She denies chest pain or shortness of breath. She denies fevers or chills. She denies abdominal pain. She denies nausea vomiting or diarrhea. She states she has been urinating more frequently, however denies dysuria. The history is provided by the patient. No  was used. Review of Systems   Constitutional: Positive for fatigue. Negative for chills and fever. HENT: Negative for ear pain, sinus pressure and sore throat. Eyes: Negative for pain, discharge and redness. Respiratory: Negative for cough, shortness of breath and wheezing. Cardiovascular: Negative for chest pain. Gastrointestinal: Negative for abdominal distention, abdominal pain, diarrhea, nausea and vomiting. Endocrine: Positive for polyuria. Genitourinary: Negative for dysuria, frequency and hematuria. Musculoskeletal: Negative for arthralgias and back pain. Skin: Negative for rash and wound. Neurological: Negative for weakness and headaches. Hematological: Negative for adenopathy. All other systems reviewed and are negative. Physical Exam  Vitals signs and nursing note reviewed.    Constitutional:       General: She is not in acute distress. Appearance: Normal appearance. She is well-developed. She is obese. She is not ill-appearing. HENT:      Head: Normocephalic and atraumatic. Eyes:      Pupils: Pupils are equal, round, and reactive to light. Neck:      Musculoskeletal: Normal range of motion and neck supple. Cardiovascular:      Rate and Rhythm: Normal rate and regular rhythm. Pulses: Normal pulses. Heart sounds: No murmur. Pulmonary:      Effort: Pulmonary effort is normal. No respiratory distress. Breath sounds: Normal breath sounds. No wheezing or rales. Abdominal:      General: Bowel sounds are normal.      Palpations: Abdomen is soft. Tenderness: There is no abdominal tenderness. There is no guarding or rebound. Skin:     General: Skin is warm and dry. Capillary Refill: Capillary refill takes less than 2 seconds. Findings: Rash present. Comments: Patient has stage I sacral wounds with no active bleeding   Neurological:      Mental Status: She is alert and oriented to person, place, and time. Cranial Nerves: No cranial nerve deficit.       Coordination: Coordination normal.          Procedures     Labs Reviewed   CBC WITH AUTO DIFFERENTIAL - Abnormal; Notable for the following components:       Result Value    Hemoglobin 9.5 (*)     Hematocrit 29.9 (*)     MCH 25.5 (*)     MCHC 31.8 (*)     Lymphocytes Absolute 1.11 (*)     All other components within normal limits   COMPREHENSIVE METABOLIC PANEL W/ REFLEX TO MG FOR LOW K - Abnormal; Notable for the following components:    Chloride 92 (*)     CO2 30 (*)     Glucose 535 (*)     Alb 3.4 (*)     Alkaline Phosphatase 108 (*)     All other components within normal limits    Narrative:     CALL  [facility]  H34 tel. ,  Chemistry results called to and read back by  Priscilla Cervantes, 11/17/2020 15:25,  by Power Fernando - Abnormal; Notable for the following components:    Glucose, Ur >=1000 (*)     Ketones, Urine 15 (*)     All other components within normal limits   POCT GLUCOSE - Abnormal; Notable for the following components:    Meter Glucose 486 (*)     All other components within normal limits   POCT GLUCOSE - Normal   LIPASE   LACTIC ACID, PLASMA   MICROSCOPIC URINALYSIS   PH, VENOUS   BETA-HYDROXYBUTYRATE     No orders to display       MDM  Number of Diagnoses or Management Options  Diagnosis management comments: Patient is a 56-year-old female presents today for rash and wound to her coccyx. On physical exam patient does have a stage I sacral wound. Patient has been more fatigued and having difficulty ambulating throughout the house, states she has been urinating and defecating and not being able to make to the bathroom, this is for she has been sitting in her own urine and feces. She lives at home with  who she states is try to take care of her. She is type II diabetic on insulin. Patient was found to be hyperglycemic with a sugar of 535. Patient is not in DKA as her anion gap is 10, CO2 is 30. Patient states she has not taken her insulin for the past several days as she is more fatigued and weak. She was given multiple liters of fluid in the department, she was given 10 units of insulin. We recommended mission the hospital for hyperglycemia, as well as to be evaluated by wound care for her large sacral wound.        Amount and/or Complexity of Data Reviewed  Clinical lab tests: reviewed             ED Course as of Nov 17 1749 Tue Nov 17, 2020   [id number] Spoke with sound who will admit    []      ED Course User Index  [] Mariama Varghese, DO       --------------------------------------------- PAST HISTORY ---------------------------------------------  Past Medical History:  has a past medical history of (HFpEF) heart failure with preserved ejection fraction ([address] Anal fissure, Anemia, Anxiety and depression, Arthritis, Asthma, Atrial fibrillation ([address] Blood transfusion, CHF (congestive heart failure) ([address] COPD (chronic obstructive pulmonary disease) ([facility] Disc disorder, DM2 (diabetes mellitus, type 2) ([facility] Fall due to stumbling, GERD (gastroesophageal reflux disease), Hx of blood clots, Hyperlipidemia, Hypertension, Inappropriate sinus tachycardia, LVH (left ventricular hypertrophy), Lymphadenitis, chronic, Occipital neuralgia, Respiratory acidosis, and Sinus tachycardia. Past Surgical History:  has a past surgical history that includes Tonsillectomy (); Appendectomy ();  section ();  section ();  section (); Anus surgery (2006); Upper gastrointestinal endoscopy (2004); Colonoscopy (2004); Colonoscopy (2006); anoscopy (10/25/2006); anoscopy (2007); anoscopy (2007); Colonoscopy (3/23/2012); Anus surgery (2012); joint replacement (); Upper gastrointestinal endoscopy (N/A, 2018); Upper gastrointestinal endoscopy (N/A, 2018); and pr debridement, skin, sub-q tissue,muscle,=<20 sq cm (Left, 2018). Social History:  reports that she quit smoking about 15 years ago. Her smoking use included cigarettes. She started smoking about 41 years ago. She has a 37.50 pack-year smoking history. She has never used smokeless tobacco. She reports that she does not drink alcohol or use drugs. Family History: family history includes Colon Cancer in her father and sister; Diabetes in her father, paternal aunt, paternal aunt, paternal uncle, and paternal uncle; Heart Disease in her mother; Other in her father. The patients home medications have been reviewed.     Allergies: Statins    -------------------------------------------------- RESULTS -------------------------------------------------    LABS:  Results for orders placed or performed during the hospital encounter of 20   CBC Auto Differential   Result Value Ref Range    WBC 5.4 4.5 - 11.5 E9/L    RBC 3.73 3.50 - 5.50 E12/L    Hemoglobin 9.5 (L) 11.5 - 15.5 g/dL    Hematocrit 29.9 (L) 34.0 - 48.0 %    MCV 80.2 80.0 - 99.9 fL    MCH 25.5 (L) 26.0 - 35.0 pg    MCHC 31.8 (L) 32.0 - 34.5 %    RDW 14.5 11.5 - 15.0 fL    Platelets 766 697 - 095 E9/L    MPV 9.4 7.0 - 12.0 fL    Neutrophils % 69.5 43.0 - 80.0 %    Immature Granulocytes % 0.4 0.0 - 5.0 %    Lymphocytes % 20.4 20.0 - 42.0 %    Monocytes % 6.8 2.0 - 12.0 %    Eosinophils % 2.0 0.0 - 6.0 %    Basophils % 0.9 0.0 - 2.0 %    Neutrophils Absolute 3.77 1.80 - 7.30 E9/L    Immature Granulocytes # 0.02 E9/L    Lymphocytes Absolute 1.11 (L) 1.50 - 4.00 E9/L    Monocytes Absolute 0.37 0.10 - 0.95 E9/L    Eosinophils Absolute 0.11 0.05 - 0.50 E9/L    Basophils Absolute 0.05 0.00 - 0.20 E9/L   Comprehensive Metabolic Panel w/ Reflex to MG   Result Value Ref Range    Sodium 132 132 - 146 mmol/L    Potassium reflex Magnesium 5.0 3.5 - 5.0 mmol/L    Chloride 92 (L) 98 - 107 mmol/L    CO2 30 (H) 22 - 29 mmol/L    Anion Gap 10 7 - 16 mmol/L    Glucose 535 (HH) 74 - 99 mg/dL    BUN 13 8 - 23 mg/dL    CREATININE 0.6 0.5 - 1.0 mg/dL    GFR Non-African American >60 >=60 mL/min/1.73    GFR African American >60     Calcium 9.0 8.6 - 10.2 mg/dL    Total Protein 6.5 6.4 - 8.3 g/dL    Alb 3.4 (L) 3.5 - 5.2 g/dL    Total Bilirubin <0.2 0.0 - 1.2 mg/dL    Alkaline Phosphatase 108 (H) 35 - 104 U/L    ALT 7 0 - 32 U/L    AST 8 0 - 31 U/L   Lipase   Result Value Ref Range    Lipase 21 13 - 60 U/L   Lactic Acid, Plasma   Result Value Ref Range    Lactic Acid 0.9 0.5 - 2.2 mmol/L   Urinalysis, reflex to microscopic   Result Value Ref Range    Color, UA Yellow Straw/Yellow    Clarity, UA Clear Clear    Glucose, Ur >=1000 (A) Negative mg/dL    Bilirubin Urine Negative Negative    Ketones, Urine 15 (A) Negative mg/dL    Specific Gravity, UA 1.010 1.005 - 1.030    Blood, Urine TRACE-INTACT Negative    pH, UA 7.0 5.0 - 9.0    Protein, UA Negative Negative mg/dL    Urobilinogen, Urine 0.2 <2.0 E.U./dL    Nitrite, Urine Negative Negative    Leukocyte Esterase, Urine Negative Other fatigue        Disposition:  Patient's disposition: Admit to telemetry  Patient's condition is stable.              Thad Frankel DO  Resident  11/17/20 0738

## 2020-11-17 NOTE — H&P
Hospital Medicine History & Physical      PCP: Jose Brown DO    Date of Admission: 11/17/2020    Date of Service: Pt seen/examined on. 11/17/2020 and was placed in Observation with expected LOS less than two midnights due to medical therapy. Chief Complaint: Burning pain both legs      History Of Present Illness:     75 y.o. female with PMH of asthma/COPD with chronic respiratory failure requiring 4 L oxygen chronically; hypertension, atrial fibrillation, diabetes mellitus, bilateral lower extremity lymphedema with chronic wound who presented with burning pain on bilateral legs for the past few days. She also reports worsening fatigue, generalized weakness and not been having difficulty making it to the bathroom resulting in accidents. Patient states she had not used insulin for several days because she does not know how to use the insulin pen on which she was just started. She denies any chest pain, shortness of breath, palpitation; no fever/chill, abdominal pain, diarrhea, or dysuria. Patient is also complaining of severe \"pain in my tailbone\"; knows she has done ulcer for some time.     On arrival to the emergency department, she was found to have profound hypoglycemia with glucose level at 535 but no evidence of DKA    Past Medical History:          Diagnosis Date    (HFpEF) heart failure with preserved ejection fraction ([facility]     1/16/2018- echo- LVEF 55-65%    Anal fissure     Anemia 10/6/2017    Anxiety and depression     Arthritis     Asthma     Atrial fibrillation (HCC)     Eliquis    Blood transfusion     long time no reaction    CHF (congestive heart failure) (HCC)     Diastolic    COPD (chronic obstructive pulmonary disease) (HCC)     Disc disorder     DM2 (diabetes mellitus, type 2) ([facility]     on insulin    Fall due to stumbling 10/6/2017    GERD (gastroesophageal reflux disease)     Hx of blood clots     Hyperlipidemia     Hypertension     Inappropriate sinus Provider   apixaban (ELIQUIS) 5 MG TABS tablet Take 5 mg by mouth 2 times daily   Yes Historical Provider, MD   bumetanide (BUMEX) 2 MG tablet Take 2 mg by mouth 2 times daily   Yes Historical Provider, MD   dilTIAZem (DILACOR XR) 240 MG extended release capsule Take 240 mg by mouth 2 times daily   Yes Historical Provider, MD   escitalopram (LEXAPRO) 20 MG tablet Take 20 mg by mouth 2 times daily    Yes Historical Provider, MD   insulin lispro (HUMALOG) 100 UNIT/ML injection vial Inject 50 Units into the skin 3 times daily (before meals)    Yes Historical Provider, MD   gabapentin (NEURONTIN) 300 MG capsule Take 300 mg by mouth 3 times daily.    Yes Historical Provider, MD   insulin glargine (LANTUS) 100 UNIT/ML injection vial Inject 20 Units into the skin 2 times daily   Yes Historical Provider, MD   lansoprazole (PREVACID) 30 MG delayed release capsule Take 30 mg by mouth 2 times daily   Yes Historical Provider, MD   traZODone (DESYREL) 50 MG tablet Take 50 mg by mouth nightly   Yes Historical Provider, MD   vitamin D (ERGOCALCIFEROL) 1.25 MG (01143 UT) CAPS capsule Take 50,000 Units by mouth once a week Given Monday   Yes Historical Provider, MD   ipratropium-albuterol (DUONEB) 0.5-2.5 (3) MG/3ML SOLN nebulizer solution Take 3 mLs by nebulization every 6 hours as needed for Shortness of Breath 9/30/20  Yes Anayeli Samuels,    nystatin (MYCOSTATIN) 260346 UNIT/GM powder Apply topically 3 times daily as needed (rash) 9/30/20  Yes Anayeli Samuels,    omega-3 acid ethyl esters (LOVAZA) 1 g capsule Take 2 capsules by mouth 2 times daily 9/1/20  Yes Jessica Alvarez,    spironolactone (ALDACTONE) 25 MG tablet Take 1 tablet by mouth daily 8/27/20  Yes Jessica Alvarez,    montelukast (SINGULAIR) 10 MG tablet Take 1 tablet by mouth nightly 8/27/20  Yes Jessica Alvarez,    albuterol sulfate  (90 Base) MCG/ACT inhaler Inhale 2 puffs into the lungs 4 times daily as needed for Wheezing 8/27/20  Yes Gabby Israel, DO   metoprolol succinate (TOPROL XL) 100 MG extended release tablet Take 1 tablet by mouth daily 8/25/20  Yes Halima Alvarez, DO   ferrous sulfate (IRON 325) 325 (65 Fe) MG tablet Take 1 tablet by mouth daily (with breakfast) 8/25/20  Yes Halima Palomosumit Kim, DO   Magic Mouthwash (MIRACLE MOUTHWASH) Swish and spit 5 mLs 4 times daily as needed for Irritation 5/28/20  Yes Gabby Him, DO   acetaminophen (TYLENOL) 325 MG tablet Take 650 mg by mouth every 6 hours as needed for Pain   Yes Historical Provider, MD   OXYGEN Inhale 4 L into the lungs continuous     Historical Provider, MD       Allergies:  Statins    Social History:      TOBACCO:   reports that she quit smoking about 15 years ago. Her smoking use included cigarettes. She started smoking about 41 years ago. She has a 37.50 pack-year smoking history. She has never used smokeless tobacco.  ETOH:   reports no history of alcohol use. Family History:      Reviewed in detail :        Problem Relation Age of Onset    Heart Disease Mother     Diabetes Father     Colon Cancer Father     Other Father         BLINDNESS    Colon Cancer Sister     Diabetes Paternal Aunt     Diabetes Paternal Uncle     Diabetes Paternal Aunt     Diabetes Paternal Uncle        REVIEW OF SYSTEMS:   Pertinent positives as noted in the HPI. All other systems reviewed and negative. PHYSICAL EXAM:    BP (!) 141/88   Pulse 90   Temp 97 °F (36.1 °C) (Oral)   Resp 20   Ht 5' 3\" (1.6 m)   Wt 280 lb (127 kg)   LMP  (LMP Unknown)   SpO2 96%   BMI 49.60 kg/m²     General appearance:  Lying comfortably in bed. No apparent distress. HEENT:  Normal cephalic, atraumatic without obvious deformity. Pupils equal, round, and reactive to light. Extra ocular muscles intact. Conjunctivae/corneas clear. Neck: Supple, with full range of motion. No jugular venous distention. Trachea midline. Respiratory:  Clear to auscultation bilaterally.   Cardiovascular: medications  PT/OT eval      DVT Prophylaxis: On Eliquis  Diet: No diet orders on file  Code Status: Prior      Dispo - home with [facility] once stable       Jude Sandoval MD 11/17/2020 6:44 PM    Thank you Garland De La Cruz DO for the opportunity to be involved in this patient's care. If you have any questions or concerns please feel free to contact me at [phone].

## 2020-11-18 LAB
ANION GAP SERPL CALCULATED.3IONS-SCNC: 10 MMOL/L (ref 7–16)
BASOPHILS ABSOLUTE: 0.06 E9/L (ref 0–0.2)
BASOPHILS RELATIVE PERCENT: 1 % (ref 0–2)
BUN BLDV-MCNC: 8 MG/DL (ref 8–23)
CALCIUM SERPL-MCNC: 8.7 MG/DL (ref 8.6–10.2)
CHLORIDE BLD-SCNC: 92 MMOL/L (ref 98–107)
CO2: 32 MMOL/L (ref 22–29)
CREAT SERPL-MCNC: 0.6 MG/DL (ref 0.5–1)
EOSINOPHILS ABSOLUTE: 0.19 E9/L (ref 0.05–0.5)
EOSINOPHILS RELATIVE PERCENT: 3.2 % (ref 0–6)
GFR AFRICAN AMERICAN: >60
GFR NON-AFRICAN AMERICAN: >60 ML/MIN/1.73
GLUCOSE BLD-MCNC: 292 MG/DL (ref 74–99)
HCT VFR BLD CALC: 32.9 % (ref 34–48)
HEMOGLOBIN: 10.1 G/DL (ref 11.5–15.5)
IMMATURE GRANULOCYTES #: 0.03 E9/L
IMMATURE GRANULOCYTES %: 0.5 % (ref 0–5)
LYMPHOCYTES ABSOLUTE: 1.51 E9/L (ref 1.5–4)
LYMPHOCYTES RELATIVE PERCENT: 25.6 % (ref 20–42)
MCH RBC QN AUTO: 24.9 PG (ref 26–35)
MCHC RBC AUTO-ENTMCNC: 30.7 % (ref 32–34.5)
MCV RBC AUTO: 81.2 FL (ref 80–99.9)
METER GLUCOSE: 297 MG/DL (ref 74–99)
METER GLUCOSE: 309 MG/DL (ref 74–99)
METER GLUCOSE: 315 MG/DL (ref 74–99)
METER GLUCOSE: 389 MG/DL (ref 74–99)
METER GLUCOSE: 403 MG/DL (ref 74–99)
MONOCYTES ABSOLUTE: 0.52 E9/L (ref 0.1–0.95)
MONOCYTES RELATIVE PERCENT: 8.8 % (ref 2–12)
NEUTROPHILS ABSOLUTE: 3.58 E9/L (ref 1.8–7.3)
NEUTROPHILS RELATIVE PERCENT: 60.9 % (ref 43–80)
PDW BLD-RTO: 14.5 FL (ref 11.5–15)
PLATELET # BLD: 334 E9/L (ref 130–450)
PMV BLD AUTO: 9.4 FL (ref 7–12)
POTASSIUM REFLEX MAGNESIUM: 3.9 MMOL/L (ref 3.5–5)
RBC # BLD: 4.05 E12/L (ref 3.5–5.5)
SODIUM BLD-SCNC: 134 MMOL/L (ref 132–146)
WBC # BLD: 5.9 E9/L (ref 4.5–11.5)

## 2020-11-18 PROCEDURE — 36415 COLL VENOUS BLD VENIPUNCTURE: CPT

## 2020-11-18 PROCEDURE — 82962 GLUCOSE BLOOD TEST: CPT

## 2020-11-18 PROCEDURE — 97530 THERAPEUTIC ACTIVITIES: CPT

## 2020-11-18 PROCEDURE — G0378 HOSPITAL OBSERVATION PER HR: HCPCS

## 2020-11-18 PROCEDURE — 85025 COMPLETE CBC W/AUTO DIFF WBC: CPT

## 2020-11-18 PROCEDURE — 2700000000 HC OXYGEN THERAPY PER DAY

## 2020-11-18 PROCEDURE — 97166 OT EVAL MOD COMPLEX 45 MIN: CPT

## 2020-11-18 PROCEDURE — 97162 PT EVAL MOD COMPLEX 30 MIN: CPT

## 2020-11-18 PROCEDURE — 6370000000 HC RX 637 (ALT 250 FOR IP): Performed by: HOSPITALIST

## 2020-11-18 PROCEDURE — 2580000003 HC RX 258: Performed by: HOSPITALIST

## 2020-11-18 PROCEDURE — 80048 BASIC METABOLIC PNL TOTAL CA: CPT

## 2020-11-18 PROCEDURE — 6370000000 HC RX 637 (ALT 250 FOR IP): Performed by: INTERNAL MEDICINE

## 2020-11-18 PROCEDURE — 2500000003 HC RX 250 WO HCPCS: Performed by: INTERNAL MEDICINE

## 2020-11-18 RX ORDER — PETROLATUM 42 G/100G
OINTMENT TOPICAL DAILY
Status: DISCONTINUED | OUTPATIENT
Start: 2020-11-18 | End: 2020-11-26 | Stop reason: HOSPADM

## 2020-11-18 RX ADMIN — PETROLATUM: 42 OINTMENT TOPICAL at 10:07

## 2020-11-18 RX ADMIN — METOPROLOL SUCCINATE 100 MG: 50 TABLET, EXTENDED RELEASE ORAL at 08:27

## 2020-11-18 RX ADMIN — Medication: at 21:04

## 2020-11-18 RX ADMIN — OMEGA-3-ACID ETHYL ESTERS 2 G: 1 CAPSULE, LIQUID FILLED ORAL at 08:29

## 2020-11-18 RX ADMIN — APIXABAN 5 MG: 5 TABLET, FILM COATED ORAL at 08:28

## 2020-11-18 RX ADMIN — BUMETANIDE 2 MG: 1 TABLET ORAL at 08:28

## 2020-11-18 RX ADMIN — DILTIAZEM HYDROCHLORIDE 240 MG: 240 CAPSULE, EXTENDED RELEASE ORAL at 08:29

## 2020-11-18 RX ADMIN — INSULIN LISPRO 12 UNITS: 100 INJECTION, SOLUTION INTRAVENOUS; SUBCUTANEOUS at 16:25

## 2020-11-18 RX ADMIN — GABAPENTIN 300 MG: 300 CAPSULE ORAL at 21:03

## 2020-11-18 RX ADMIN — GABAPENTIN 300 MG: 300 CAPSULE ORAL at 08:28

## 2020-11-18 RX ADMIN — APIXABAN 5 MG: 5 TABLET, FILM COATED ORAL at 21:04

## 2020-11-18 RX ADMIN — INSULIN LISPRO 8 UNITS: 100 INJECTION, SOLUTION INTRAVENOUS; SUBCUTANEOUS at 11:26

## 2020-11-18 RX ADMIN — SPIRONOLACTONE 25 MG: 25 TABLET ORAL at 08:29

## 2020-11-18 RX ADMIN — Medication: at 10:08

## 2020-11-18 RX ADMIN — INSULIN LISPRO 10 UNITS: 100 INJECTION, SOLUTION INTRAVENOUS; SUBCUTANEOUS at 08:13

## 2020-11-18 RX ADMIN — INSULIN LISPRO 10 UNITS: 100 INJECTION, SOLUTION INTRAVENOUS; SUBCUTANEOUS at 11:32

## 2020-11-18 RX ADMIN — INSULIN LISPRO 5 UNITS: 100 INJECTION, SOLUTION INTRAVENOUS; SUBCUTANEOUS at 21:06

## 2020-11-18 RX ADMIN — INSULIN LISPRO 6 UNITS: 100 INJECTION, SOLUTION INTRAVENOUS; SUBCUTANEOUS at 08:09

## 2020-11-18 RX ADMIN — TRAZODONE HYDROCHLORIDE 50 MG: 50 TABLET ORAL at 21:03

## 2020-11-18 RX ADMIN — TRAMADOL HYDROCHLORIDE 100 MG: 50 TABLET ORAL at 23:19

## 2020-11-18 RX ADMIN — ACETAMINOPHEN 650 MG: 325 TABLET ORAL at 23:19

## 2020-11-18 RX ADMIN — ACETAMINOPHEN 650 MG: 325 TABLET ORAL at 04:59

## 2020-11-18 RX ADMIN — FERROUS SULFATE TAB 325 MG (65 MG ELEMENTAL FE) 325 MG: 325 (65 FE) TAB at 08:28

## 2020-11-18 RX ADMIN — INSULIN GLARGINE 32 UNITS: 100 INJECTION, SOLUTION SUBCUTANEOUS at 21:06

## 2020-11-18 RX ADMIN — MONTELUKAST 10 MG: 10 TABLET, FILM COATED ORAL at 21:03

## 2020-11-18 RX ADMIN — ACETAMINOPHEN 650 MG: 325 TABLET ORAL at 16:21

## 2020-11-18 RX ADMIN — OMEGA-3-ACID ETHYL ESTERS 2 G: 1 CAPSULE, LIQUID FILLED ORAL at 21:03

## 2020-11-18 RX ADMIN — PANTOPRAZOLE SODIUM 40 MG: 40 TABLET, DELAYED RELEASE ORAL at 07:21

## 2020-11-18 RX ADMIN — MICONAZOLE NITRATE: 20.6 POWDER TOPICAL at 10:18

## 2020-11-18 RX ADMIN — Medication 10 ML: at 21:05

## 2020-11-18 RX ADMIN — Medication 10 ML: at 08:32

## 2020-11-18 RX ADMIN — ESCITALOPRAM 20 MG: 10 TABLET, FILM COATED ORAL at 08:29

## 2020-11-18 RX ADMIN — GABAPENTIN 300 MG: 300 CAPSULE ORAL at 13:40

## 2020-11-18 RX ADMIN — MICONAZOLE NITRATE: 20.6 POWDER TOPICAL at 21:05

## 2020-11-18 RX ADMIN — BUMETANIDE 2 MG: 1 TABLET ORAL at 21:03

## 2020-11-18 RX ADMIN — INSULIN LISPRO 10 UNITS: 100 INJECTION, SOLUTION INTRAVENOUS; SUBCUTANEOUS at 16:26

## 2020-11-18 ASSESSMENT — PAIN - FUNCTIONAL ASSESSMENT
PAIN_FUNCTIONAL_ASSESSMENT: PREVENTS OR INTERFERES SOME ACTIVE ACTIVITIES AND ADLS

## 2020-11-18 ASSESSMENT — PAIN DESCRIPTION - PAIN TYPE
TYPE: ACUTE PAIN

## 2020-11-18 ASSESSMENT — PAIN SCALES - GENERAL
PAINLEVEL_OUTOF10: 0
PAINLEVEL_OUTOF10: 6
PAINLEVEL_OUTOF10: 10
PAINLEVEL_OUTOF10: 5
PAINLEVEL_OUTOF10: 3

## 2020-11-18 ASSESSMENT — PAIN DESCRIPTION - DESCRIPTORS
DESCRIPTORS: ACHING
DESCRIPTORS: ACHING;SORE;DISCOMFORT
DESCRIPTORS: ACHING
DESCRIPTORS: ACHING

## 2020-11-18 ASSESSMENT — PAIN DESCRIPTION - LOCATION
LOCATION: FOOT;LEG

## 2020-11-18 ASSESSMENT — PAIN DESCRIPTION - PROGRESSION
CLINICAL_PROGRESSION: NOT CHANGED

## 2020-11-18 ASSESSMENT — PAIN DESCRIPTION - ORIENTATION
ORIENTATION: RIGHT;LEFT

## 2020-11-18 ASSESSMENT — PAIN DESCRIPTION - ONSET
ONSET: ON-GOING

## 2020-11-18 ASSESSMENT — PAIN DESCRIPTION - FREQUENCY
FREQUENCY: CONTINUOUS

## 2020-11-18 NOTE — PROGRESS NOTES
Physical Therapy  Physical Therapy Initial Assessment     Name: Jose Cruz Graves  : 1955  MRN: [id number]    Referring Provider:  Monica Bruce MD     Date of Service: 2020    Evaluating PT:  Meme Valentino PT   Room #:  0310/9397-C  Diagnosis: Hyperglycemia [R73.9]  Hyperglycemia [R73.9]     PMHx/PSHx:   has a past medical history of (HFpEF) heart failure with preserved ejection fraction ([facility] Anal fissure, Anemia, Anxiety and depression, Arthritis, Asthma, Atrial fibrillation ([facility] Blood transfusion, CHF (congestive heart failure) ([facility] COPD (chronic obstructive pulmonary disease) ([facility] Disc disorder, DM2 (diabetes mellitus, type 2) ([facility] Fall due to stumbling, GERD (gastroesophageal reflux disease), Hx of blood clots, Hyperlipidemia, Hypertension, Inappropriate sinus tachycardia, LVH (left ventricular hypertrophy), Lymphadenitis, chronic, Occipital neuralgia, Respiratory acidosis, and Sinus tachycardia. has a past surgical history that includes Tonsillectomy (); Appendectomy ();  section ();  section ();  section (); Anus surgery (2006); Upper gastrointestinal endoscopy (2004); Colonoscopy (2004); Colonoscopy (2006); anoscopy (10/25/2006); anoscopy (2007); anoscopy (2007); Colonoscopy (3/23/2012); Anus surgery (2012); joint replacement (); Upper gastrointestinal endoscopy (N/A, 2018); Upper gastrointestinal endoscopy (N/A, 2018); and pr debridement, skin, sub-q tissue,muscle,=<20 sq cm (Left, 2018). Procedure/Surgery:  none  Precautions:  Falls, skin integrity, TAPS   Equipment Needs: To be determined      SUBJECTIVE:    Patient lives with spouse  and 2 grandsons in a two story home, bed and bath 2nd. Unable to access 2nd floor stair glide broken, O2 at home 4L. Ramped home entrance. Bed is on 2 floor and bath is on 2 floor. Patient ambulated with wheeled walker  PTA.  Equipment owned: [facility], hernan pereira, w/c    OBJECTIVE:   Initial Evaluation  Date: 11/18/20 Treatment Short Term/ Long Term   Goals   AM-PAC 6 Clicks 12/23     Was pt agreeable to Eval/treatment? yes     Does pt have pain? yes     Bed Mobility  Rolling: SBA  Supine to sit: Min for BLE  Sit to supine: Min for BLE  Scooting: Min  Rolling: Ind  Supine to sit: Ind  Sit to supine: Ind  Scooting: Ind   Transfers Sit to stand: Min to fww  Stand to sit: Min  Stand pivot: Min with fww. Sit to stand: Mod Ind  Stand to sit: Mod Ind  Stand pivot: Mod Ind   Ambulation    20 feet with fww with Min to commode. 20 feet return to bed. 50 feet with fww Mod Ind   Stair negotiation: ascended and descended  NT  Ramped home. ROM BUE:  See OT eval  BLE:  Limited by body mass. Strength BUE: See OT eval   RLE:  4+/5  LLE:   4+/5  5/5   Balance Sitting EOB:  Ind  Dynamic Standing:  Min A with fww. Sitting EOB:  Ind  Dynamic Standing: Mod Ind     Patient is Alert & Oriented x person, place, time and situation and follows directions   Sensation:  Pt denies numbness and tingling to extremities  Edema:  BLE    Vitals:  Blood Pressure at rest - Blood Pressure post session -   Heart Rate at rest - Heart Rate post session -   SPO2 at rest 94% with 4L SPO2 post session -%     Therapeutic Exercises:  none    Patient education  Patient educated on role of Physical Therapy, risks of immobility, safety and plan of care     Patient response to education:   Pt verbalized understanding Pt demonstrated skill Pt requires further education in this area   yesd yes reminders     ASSESSMENT:    Comments:  Patient was seen this date for PT evaluation. . Patient was agreeable to intervention. Results of the functional assessment are noted above. Upon entering the room patient was found supine in bed. Requested to use commode. Gait completed with fww with cues for sequencing. Increased assist required to stand up from the commode due to low surface.  Placed TAPS system with patient on L side. Patient would benefit from continued skilled Physical Therapy to improve functional independence and quality of life. At end of session, patient in bed with  call light and phone within reach,  all lines and tubes intact, nursing notified. This patient can benefit from the continuation of skilled PT  to maximize functional level and return to PLOF. Treatment:  Patient practiced and was instructed in the following treatment:    · Bed mobility training - pt given verbal and tactile cues to facilitate proper sequencing and safety during rolling and supine>sit as well as provided with physical assistance to complete task    · Assistive device training - pt educated on using [employer] during gait, [employer] approximation/negotiation, and hand placement during sit<>stand to [employer]  · STS and transfer training - educated on hand/foot placement, safety, and sequencing during STS and pivot transfers using assistive device  · Gait training - verbal cues for [employer] approximation/negotiation, upright posture, and safety during 90 and 180 degree turns during gait     Pt's/ family goals   1. Return to home. Patient and or family understand(s) diagnosis, prognosis, and plan of care. yes    PLAN OF CARE:    PT care will be provided in accordance with the objectives noted above. Exercises and functional mobility practice will be used as well as appropriate assistive devices or modalities to obtain goals. Patient and family education will also be administered as needed. Current Treatment Recommendations     [x] Strengthening     [] ROM   [x] Balance Training   [x] Endurance Training   [x] Transfer Training   [x] Gait Training   [x] Stair Training   [] Positioning   [] Safety and Education Training   [] Patient/Caregiver Education   [] HEP  [] Other     Frequency of treatments: 2-5x/week x 1-2 weeks.     Time in  1305  Time out  1330    Total Treatment Time  25 minutes     Evaluation Time includes thorough

## 2020-11-18 NOTE — PROGRESS NOTES
Asked patient if she would like me to call one of her sons with an update, She stated no, that she will call them. Currently on the phone with her sister.

## 2020-11-18 NOTE — PROGRESS NOTES
Progress note hospitalist service      Patient admitted overnight by hospital service  Details of the admission as outlined below  Chart reviewed patient interviewed and examined    Chief Complaint: Burning pain both legs    History Of Present Illness:     75 y.o. female with PMH of asthma/COPD with chronic respiratory failure requiring 4 L oxygen chronically; hypertension, atrial fibrillation, diabetes mellitus, bilateral lower extremity lymphedema with chronic wound who presented with burning pain on bilateral legs for the past few days. She also reports worsening fatigue, generalized weakness and not been having difficulty making it to the bathroom resulting in accidents. Patient states she had not used insulin for several days because she does not know how to use the insulin pen on which she was just started. She denies any chest pain, shortness of breath, palpitation; no fever/chill, abdominal pain, diarrhea, or dysuria. Patient is also complaining of severe \"pain in my tailbone\"; knows she has done ulcer for some time.     On arrival to the emergency department, she was found to have profound hyperglycemia with glucose level at 535 but no evidence of DKA    Past Medical History:          Diagnosis Date    (HFpEF) heart failure with preserved ejection fraction ([facility]     1/16/2018- echo- LVEF 55-65%    Anal fissure     Anemia 10/6/2017    Anxiety and depression     Arthritis     Asthma     Atrial fibrillation (HCC)     Eliquis    Blood transfusion     long time no reaction    CHF (congestive heart failure) (HCC)     Diastolic    COPD (chronic obstructive pulmonary disease) (HCC)     Disc disorder     DM2 (diabetes mellitus, type 2) ([facility]     on insulin    Fall due to stumbling 10/6/2017    GERD (gastroesophageal reflux disease)     Hx of blood clots     Hyperlipidemia     Hypertension     Inappropriate sinus tachycardia     LVH (left ventricular hypertrophy)     moderate    Lymphadenitis, chronic 4/13/2018    Occipital neuralgia 11/2/2011    Respiratory acidosis 10/7/2017    Sinus tachycardia 2/16/2015       Past Surgical History:          Procedure Laterality Date    ANOSCOPY  10/25/2006    injection of anal sphincter with Botox, Franklyn Ortiz/Timmy, [facility]    ANOSCOPY  4/9/2007    injection of anal sphincter with Botox, Dr. Ap Gamez, [address] [place] [address] ANOSCOPY  6/13/2007    injection of anal sphincter with Botox, Franklyn Balderas [facility]    ANUS SURGERY  4/4/2006    Lateral sphincterotomy for chronic anal fissure, Dr. Cecilia Edwards, [facility]  4/18/2012    anal exam and injection of Botox 100 units into anal sphincter, Laila Balderas, [facility]    APPENDECTOMY  1965   [address]    COLONOSCOPY  1/20/2004    cecal polyp, bx (no pathologic changes), Dr. Meka Tabares, [address] [place] [address] COLONOSCOPY  8/11/2006    snare polypectomy terminal ileum polyp (granulation tissue polyp) and anal polyp (inflammatory/papilla), Dr. Ap Gamez, [address] [place] [address] COLONOSCOPY  3/23/2012    bx/cauterization proximal ascending colon polyp, injection of anal sphincter with Botox, Dr. Ap Gamez, [facility]  2003    [facility], SKIN, SUB-Q TISSUE,MUSCLE,=<20 SQ CM Left 11/30/2018    LEFT LEG EXCISIONAL  DEBRIDEMENT WITH TISSUE BIOPSY [facility] D[facility] [address] [facility]OSCOPY  1/20/2004    gastritis, bx (no H pylori), Dr. Meka Tabares, [address] ENDOSCOPY N/A 6/1/2018    EGD BIOPSY performed by Avinash Gould MD at [address] [place] [address],Third Floor N/A 11/9/2018    EGD BIOPSY performed by Avinash Gould MD at [facility] ENDOSCOPY       Allergies:  Statins    Social History:      TOBACCO:   reports that she quit smoking about 15 years ago. Her smoking use included cigarettes. She started smoking about 41 years ago.  She has a 37.50 pack-year smoking history. She has never used smokeless tobacco.  ETOH:   reports no history of alcohol use. REVIEW OF SYSTEMS:     Pertinent positives as noted in the HPI. All other systems reviewed and negative. PHYSICAL EXAM:    /62   Pulse 85   Temp 98.7 °F (37.1 °C) (Temporal)   Resp 16   Ht 5' 3\" (1.6 m)   Wt 280 lb (127 kg)   LMP  (LMP Unknown)   SpO2 99%   BMI 49.60 kg/m²     General appearance:  Lying comfortably in bed. No apparent distress. Morbidly obese  HEENT:  Normal cephalic, atraumatic without obvious deformity. Pupils equal, round, and reactive to light. Extra ocular muscles intact. Conjunctivae/corneas clear. Neck: Supple, with full range of motion. No jugular venous distention. Trachea midline. Respiratory:  Clear to auscultation bilaterally. Cardiovascular: Normal S1/S2. Regular rhythm and rate. Abdomen: Soft, non-tender, non-distended with normal bowel sounds. Extremities: Lymphedema on bilateral lower extremities with chronic venous ulcers and severe skin changes bilaterally  Skin: Skin color, texture, turgor normal.  No rashes or lesions. Neurologic:  No focal deficit. Psychiatric:  Alert and oriented, thought content appropriate, normal insight  Peripheral Pulses: +2 palpable, equal bilaterally       CXR:   No results found. Labs:     Recent Labs     11/17/20  1433 11/18/20  0624   WBC 5.4 5.9   HGB 9.5* 10.1*   HCT 29.9* 32.9*    334     Recent Labs     11/17/20  1433 11/18/20  0624    134   K 5.0 3.9   CL 92* 92*   CO2 30* 32*   BUN 13 8   CREATININE 0.6 0.6   CALCIUM 9.0 8.7     Recent Labs     11/17/20  1433   AST 8   ALT 7   BILITOT <0.2   ALKPHOS 108*     No results for input(s): INR in the last 72 hours. No results for input(s): Alma Rosa Belts in the last 72 hours.     Urinalysis:      Lab Results   Component Value Date    NITRU Negative 11/17/2020    WBCUA NONE 11/17/2020    BACTERIA NONE SEEN 11/17/2020    RBCUA 0-1 11/17/2020 BLOODU TRACE-INTACT 11/17/2020    SPECGRAV 1.010 11/17/2020    GLUCOSEU >=1000 11/17/2020             Active Hospital Problems    Diagnosis Date Noted    Hyperglycemia [R73.9] 11/17/2020     ASSESSMENT:  1. Uncontrolled type II diabetes mellitus - presented with profound hypoglycemia clinically secondary to poor compliance to insulin regimen due to poor education  2. Chronic venous ulcers on bilateral lower extremity associated with lymphedema  3. Stage I sacral wound (POA) - causing significant pain  4. Chronic respiratory failure with hypoxia  5. Chronic diastolic CHF  6. Asthma/COPD  7. Atrial fibrillation  8. Chronic anticoagulation  9. Hypertension  10. Morbid obesity - Body mass index is 49.6 kg/m². 9.  Physical deconditioning    PLAN:  Admit to MedSurg unit  Start basal, prandial, and corrective bolus insulin regimen-Asimia now better controlled  Diabetic education  Wound care consult-has been completed and reviewed  She requires 4 L nasal cannula  Social service to start discharge planning soon as possible  Maintain Bumex diuretic therapy and anticoagulation  Monitor electrolytes and blood count    PRN analgesia  Resume home medications  PT/OT eval      DVT Prophylaxis: On Eliquis  Diet: DIET CARB CONTROL;  Code Status: Full Code      Dispo - home with Alvaro Del Castillo once stable       Demarco Wise MD 11/18/2020 8:56 AM    Thank you Jose Camp DO for the opportunity to be involved in this patient's care. If you have any questions or concerns please feel free to contact me at [phone].

## 2020-11-18 NOTE — PLAN OF CARE
Problem: Skin Integrity:  Goal: Will show no infection signs and symptoms  Description: Will show no infection signs and symptoms  11/18/2020 1827 by Av Woodard  Outcome: Met This Shift  11/18/2020 1110 by Av Woodard  Outcome: Met This Shift  Goal: Absence of new skin breakdown  Description: Absence of new skin breakdown  11/18/2020 1827 by Av Woodard  Outcome: Met This Shift  11/18/2020 1110 by Av Woodard  Outcome: Met This Shift     Problem: Falls - Risk of:  Goal: Will remain free from falls  Description: Will remain free from falls  11/18/2020 1827 by Av Woodard  Outcome: Met This Shift  11/18/2020 1110 by Av Woodard  Outcome: Met This Shift  Goal: Absence of physical injury  Description: Absence of physical injury  11/18/2020 1827 by Av Woodard  Outcome: Met This Shift  11/18/2020 1110 by Av Woodard  Outcome: Met This Shift     Problem: Increased nutrient needs (NI-5.1)  Goal: Food and/or Nutrient Delivery  Description: Individualized approach for food/nutrient provision.   11/18/2020 1226 by Juanjose Plunkett RD, LD  Outcome: Met This Shift     Problem: Pain:  Goal: Pain level will decrease  Description: Pain level will decrease  11/18/2020 1827 by Av Woodard  Outcome: Ongoing  11/18/2020 1110 by Av Woodard  Outcome: Ongoing  Goal: Control of acute pain  Description: Control of acute pain  11/18/2020 1827 by Av Woodard  Outcome: Ongoing  11/18/2020 1110 by Av Woodard  Outcome: Ongoing  Goal: Control of chronic pain  Description: Control of chronic pain  11/18/2020 1827 by Av Woodard  Outcome: Ongoing  11/18/2020 1110 by Av Woodard  Outcome: Ongoing

## 2020-11-18 NOTE — PROGRESS NOTES
Education regarding diet and high BS given to patient, patient stated \"well you can just give me something, a shot or something\" educated given to patient that this nurse does not have orders to do that. Patient does not seem to understand or want to listen to the education given to her.

## 2020-11-18 NOTE — PROGRESS NOTES
Comprehensive Nutrition Assessment    Type and Reason for Visit:  Initial, Wound    Nutrition Recommendations/Plan: Continue current diet as ordered. Will add GLucerna BID, Fabrizio BID to promote PO intake& healing     Nutrition Assessment:  Pt w/ increased nutrient needs 2/2 wounds w/ noted wound to sacrum. noted hyperglycemia continuing w/ PO intake 26-50% since admit . WIll add ONS & Follow    Malnutrition Assessment:  Malnutrition Status:  No malnutrition        Estimated Daily Nutrient Needs:  Energy (kcal):  8-11= 1731-2125; Weight Used for Energy Requirements:  Current     Protein (g):  2.0-2.2 = 105-115; Weight Used for Protein Requirements:  Ideal        Fluid (ml/day):  1200ml; Method Used for Fluid Requirements:  1 ml/kcal      Nutrition Related Findings:  -I/os, abd WNL, +3 BLE w/ hx of lymphedema      Wounds:  Multiple       Current Nutrition Therapies:    DIET CARB CONTROL; Anthropometric Measures:  · Height: 5' 3\" (160 cm)  · Current Body Weight: 280 lb (127 kg)   · Usual Body Weight: 302 lb (137 kg)(12/2019 per EMR)     · Ideal Body Weight: 115 lbs; % Ideal Body Weight 243.5 %   · BMI: 49.6  · BMI Categories: Obese Class 3 (BMI 40.0 or greater)       Nutrition Diagnosis:   · Increased nutrient needs related to increase demand for energy/nutrients as evidenced by wounds      Nutrition Interventions:   Food and/or Nutrient Delivery:  Continue Current Diet, Start Oral Nutrition Supplement  Nutrition Education/Counseling:  No recommendation at this time   Coordination of Nutrition Care:  Continue to monitor while inpatient    Goals:  >50% of meals/ONS       Nutrition Monitoring and Evaluation:   Food/Nutrient Intake Outcomes:  Food and Nutrient Intake, Supplement Intake  Physical Signs/Symptoms Outcomes:  Biochemical Data, GI Status, Fluid Status or Edema, Hemodynamic Status, Nutrition Focused Physical Findings, Skin, Weight     Discharge Planning:     Too soon to determine     Electronically signed by Concetta Fox RD, LD on 11/18/20 at 12:25 PM EST    Contact: i [phone]

## 2020-11-18 NOTE — FLOWSHEET NOTE
Inpatient Wound Care (Initial consult) 5402A    Admit Date: 11/17/2020  1:50 PM    Reason for consult:  Bilateral lower legs    Significant history:  Per H&P    Chief Complaint: Burning pain both legs        History Of Present Illness:      75 y.o. female with PMH of asthma/COPD with chronic respiratory failure requiring 4 L oxygen chronically; hypertension, atrial fibrillation, diabetes mellitus, bilateral lower extremity lymphedema with chronic wound who presented with burning pain on bilateral legs for the past few days. She also reports worsening fatigue, generalized weakness and not been having difficulty making it to the bathroom resulting in accidents. Patient states she had not used insulin for several days because she does not know how to use the insulin pen on which she was just started. She denies any chest pain, shortness of breath, palpitation; no fever/chill, abdominal pain, diarrhea, or dysuria. Patient is also complaining of severe \"pain in my tailbone\"; knows she has done ulcer for some time. Findings:      11/18/20 0846   Skin Integrity   Skin Integrity   (dry,scaly, lymphadema)   Location BLE   Skin Integrity Site 2   Skin Integrity Location 2 Excoriation   Location 2   (under breasts, abdominal folds, Left thigh fold)   Skin Integrity Site 3   Skin Integrity Location 3   (redness, yeasty)    Location 3   (bilateral buttocks, medial thighs, gurmeet-area.)   Wound 11/18/20 Leg Right;Posterior; Lower   Date First Assessed/Time First Assessed: 11/18/20 0846   Present on Hospital Admission: Yes  Location: Leg  Wound Location Orientation: Right;Posterior; Lower   Wound Image    Wound Etiology Venous   Wound Length (cm) 5 cm   Wound Width (cm) 4 cm   Wound Depth (cm) 0.1 cm   Wound Surface Area (cm^2) 20 cm^2   Wound Volume (cm^3) 2 cm^3   Wound Assessment   (pink)   Drainage Amount Scant   Drainage Description Serosanguinous   Gurmeet-wound Assessment Dry/flaky      **Informed Consent**    The patient has given verbal consent to have photos taken of wound and inserted into their chart as part of their permanent medical record for purposes of documentation, treatment management and/or medical review. All Images taken on 11/18/20 of patient name: Deb Rockwell were transmitted and stored on secured [employer] located within [facility] by a registered [employer] Mobile Application Device. Impression:  Right posterior lower leg: venous  Bilateral lower legs: lymphedema    Plan: Aquaphor  Opticell/Abd pad/kerlix  Antifungal powder: folds  Aloe vesta: buttocks, gurmeet-area  Heel protectors  Comfort glide  Patient will need continued preventative care.       Michelle Stewart 11/18/2020 8:50 AM

## 2020-11-18 NOTE — PLAN OF CARE
Problem: Skin Integrity:  Goal: Will show no infection signs and symptoms  Description: Will show no infection signs and symptoms  Outcome: Met This Shift  Goal: Absence of new skin breakdown  Description: Absence of new skin breakdown  Outcome: Met This Shift     Problem: Falls - Risk of:  Goal: Will remain free from falls  Description: Will remain free from falls  Outcome: Met This Shift  Goal: Absence of physical injury  Description: Absence of physical injury  Outcome: Met This Shift     Problem: Pain:  Goal: Pain level will decrease  Description: Pain level will decrease  Outcome: Ongoing  Goal: Control of acute pain  Description: Control of acute pain  Outcome: Ongoing  Goal: Control of chronic pain  Description: Control of chronic pain  Outcome: Ongoing

## 2020-11-18 NOTE — PROGRESS NOTES
OCCUPATIONAL THERAPY INITIAL EVALUATION      Date:2020  Patient Name: Luci Smith  MRN: [id number]  : 1955  [facility]    Evaluating OT: JUDY Jon, OTR/L   License #[id number]    Referring physician: Cristy Ngo MD     AM-PAC Daily Activity Raw Score:     Recommended Adaptive Equipment: ww, toilet tongs       Diagnosis: Hyperglycemia      Surgery: none      Pertinent Medical History: Diabetes Type 2, Anemia, Arthritis, COPD, Asthma, D, hypertension, A Fib      Precautions:  Falls, Safety, 4 L O2, gerber       Home Living: Pt lives with her  and 2 grandchildren in a 2 story home with 15 PER. Pt. Has a jori going into her home. B & B on 2nd floor but patient is unable to go to the 2nd floor currently because the chair lift does not work. Bathroom setup: Walk in shower. Has BSC. Pt. Stated that she sponge bathes after set-up     Equipment owned: [facility], rollator, reacher, w/c    Prior Level of Function: Assistance with ADLs, Assistance with IADLs; using rollator for in house mobility and w/c for community mobility. Driving: No (Pt. Has family and friends for assistance)  Medication Management Self   Occupation: Pt. Did not state   Leisure:Likes to spend time with her grandchildren     Pain Level: 4/10 pain in R Leg and mod/severe pain with movement. RN notified. Cognition: A&O: 4/4; Follows 2-step step directions   Memory:  Fair    Sequencing:  Fair    Problem solving:  Fair    Judgement/safety:  Fair           Functional Assessment:    Initial Eval Status  Date: 20 Treatment Status  Date: STGs = LTGs  Time frame: 2-4 days    Feeding Independent  Independent   Grooming Set-up to comb hair at EOB   Mod I  when standing with ww   UB Dressing Set-up simulated  Independent   LB Dressing Dependent   Pt. Had severe edema in B/L LEs   Mod I with AE prn    Bathing Max A     Mod I seated    Toileting Max A has catheter     Pt.  Stated that she is not able to complete occupational therapy services provided include instruction/training on safety and adapted techniques for completion of therapeutic activities, ADLs/IADLs, and deep breathing techniques.  Therapist facilitated bed mobility, functional transfers, graded functional activities- providing min/mod verbal cuing on body mechanics, posture, compensatory strategies, and safety. Ashlee Petty Therapist facilitated self-care: grooming.  Therapist simulated UB dressing .  Therapist educated pt on compensatory strategies techniques to safely complete ADLs/IADLs.  Patient demonstrated fair understanding of completing ADLS when seated to increase independence.  Therapist educated patient on safety with ww when performing functional transfers.  Skilled monitoring of O2 sats, HR, and pt response throughout treatment. Patient would benefit from continued skilled OT to increase functional independence and quality of life. Eval Complexity: mod       · Mod Complexity  · History: Expanded chart review of medical records and additional review of physical, cognitive, or psychosocial history related to current functional performance  · Exam: 3+ performance deficits  · Assistance/Modification: min/mod assistance or modifications required to perform tasks. May have comorbidities that affect occupational performance.     Assessment of current deficits   Functional mobility [x]  ADLs [x] Strength [x]  Cognition []  Functional transfers  [x] IADLs [x] Safety Awareness [x]  Endurance [x]  Fine Motor Coordination [] Balance [x] Vision/perception [] Sensation [x]   Gross Motor Coordination [] ROM [] Delirium []                  Motor Control []    Plan of Care: 2-4 days/week for 1-2 weeks PRN     Instruction/training on adapted ADL techniques and AE recommendations to increase functional independence within precautions  Training on energy conservation strategies/techniques to improve independence/tolerance for self-care routine  Functional transfer/mobility training/DME recommendations for increased independence, safety, and fall prevention  Patient/Family education to increase follow through with safety techniques and functional independence  Therapeutic exercise to improve motor endurance, ROM, and functional strength for ADLs/functional transfers  Therapeutic activities to facilitate/challenge dynamic balance, stand tolerance with ADLs    Rehab Potential: Good for established goals    Patient / Family Goal:  Decrease Pain in B/L LEs    Patient and/or family were instructed diagnosis, prognosis/goals and plan of care. yes . [] Malnutrition indicators have been identified and nursing has been notified to ensure a dietitian consult is ordered. Time In: 9:25             Time Out: 9 :45        Total Time:20 minutes       Min Units   OT Eval Low 64807     OT Eval Medium 05437 X    OT Eval High 34899     OT Re-Eval [id number]     Therapeutic Ex 17620     Therapeutic Activities 75037 10 1   ADL/Self Care 43432     Orthotic Management 42056     Neuro Re-Ed 24115     Non-Billable Time            Evaluation time includes thorough review of current medical information, gathering information on past medical & social history & PLOF, completion of standardized testing, informal observation of tasks, consultation with other medical professions/disciplines, assessment of data & development of POC/goals.        Sharmila Clark [place], OTR/L #[id number]

## 2020-11-18 NOTE — CARE COORDINATION
Met with the pt at the bedside to discuss transition of care. The pt lives with her , who is non-verbal from CV, and her 2 grandsons. She uses a walker at home. She uses home O2 at 4L/m, which she receives from [facility]. She uses [facility] for her PCP. Confirmed with [facility]. Her PCP is Neeru Flowers NP. She is planning to return home when medically stable. She does not have transportation home. She will need to utilize [employer] transportation, Ph. [phone]. Will follow.  Jhony Tinsley RN

## 2020-11-19 LAB
ANION GAP SERPL CALCULATED.3IONS-SCNC: 11 MMOL/L (ref 7–16)
BUN BLDV-MCNC: 13 MG/DL (ref 8–23)
CALCIUM SERPL-MCNC: 8.9 MG/DL (ref 8.6–10.2)
CHLORIDE BLD-SCNC: 90 MMOL/L (ref 98–107)
CO2: 36 MMOL/L (ref 22–29)
CREAT SERPL-MCNC: 0.8 MG/DL (ref 0.5–1)
GFR AFRICAN AMERICAN: >60
GFR NON-AFRICAN AMERICAN: >60 ML/MIN/1.73
GLUCOSE BLD-MCNC: 368 MG/DL (ref 74–99)
HCT VFR BLD CALC: 33.2 % (ref 34–48)
HEMOGLOBIN: 10.2 G/DL (ref 11.5–15.5)
MCH RBC QN AUTO: 25.1 PG (ref 26–35)
MCHC RBC AUTO-ENTMCNC: 30.7 % (ref 32–34.5)
MCV RBC AUTO: 81.8 FL (ref 80–99.9)
METER GLUCOSE: 378 MG/DL (ref 74–99)
METER GLUCOSE: 443 MG/DL (ref 74–99)
METER GLUCOSE: 446 MG/DL (ref 74–99)
METER GLUCOSE: 446 MG/DL (ref 74–99)
PDW BLD-RTO: 14.6 FL (ref 11.5–15)
PLATELET # BLD: 300 E9/L (ref 130–450)
PMV BLD AUTO: 9.2 FL (ref 7–12)
POTASSIUM REFLEX MAGNESIUM: 3.8 MMOL/L (ref 3.5–5)
RBC # BLD: 4.06 E12/L (ref 3.5–5.5)
SODIUM BLD-SCNC: 137 MMOL/L (ref 132–146)
WBC # BLD: 6 E9/L (ref 4.5–11.5)

## 2020-11-19 PROCEDURE — 6370000000 HC RX 637 (ALT 250 FOR IP): Performed by: INTERNAL MEDICINE

## 2020-11-19 PROCEDURE — 97530 THERAPEUTIC ACTIVITIES: CPT

## 2020-11-19 PROCEDURE — 2700000000 HC OXYGEN THERAPY PER DAY

## 2020-11-19 PROCEDURE — 97535 SELF CARE MNGMENT TRAINING: CPT

## 2020-11-19 PROCEDURE — 80048 BASIC METABOLIC PNL TOTAL CA: CPT

## 2020-11-19 PROCEDURE — 36415 COLL VENOUS BLD VENIPUNCTURE: CPT

## 2020-11-19 PROCEDURE — 2580000003 HC RX 258: Performed by: HOSPITALIST

## 2020-11-19 PROCEDURE — 6370000000 HC RX 637 (ALT 250 FOR IP): Performed by: HOSPITALIST

## 2020-11-19 PROCEDURE — 82962 GLUCOSE BLOOD TEST: CPT

## 2020-11-19 PROCEDURE — 85027 COMPLETE CBC AUTOMATED: CPT

## 2020-11-19 PROCEDURE — G0378 HOSPITAL OBSERVATION PER HR: HCPCS

## 2020-11-19 RX ORDER — INSULIN GLARGINE 100 [IU]/ML
36 INJECTION, SOLUTION SUBCUTANEOUS NIGHTLY
Status: DISCONTINUED | OUTPATIENT
Start: 2020-11-19 | End: 2020-11-20

## 2020-11-19 RX ADMIN — INSULIN LISPRO 6 UNITS: 100 INJECTION, SOLUTION INTRAVENOUS; SUBCUTANEOUS at 21:26

## 2020-11-19 RX ADMIN — APIXABAN 5 MG: 5 TABLET, FILM COATED ORAL at 21:19

## 2020-11-19 RX ADMIN — MICONAZOLE NITRATE: 20.6 POWDER TOPICAL at 08:20

## 2020-11-19 RX ADMIN — OMEGA-3-ACID ETHYL ESTERS 2 G: 1 CAPSULE, LIQUID FILLED ORAL at 08:19

## 2020-11-19 RX ADMIN — Medication: at 21:32

## 2020-11-19 RX ADMIN — TRAMADOL HYDROCHLORIDE 50 MG: 50 TABLET ORAL at 12:22

## 2020-11-19 RX ADMIN — METOPROLOL SUCCINATE 100 MG: 50 TABLET, EXTENDED RELEASE ORAL at 08:18

## 2020-11-19 RX ADMIN — PETROLATUM: 42 OINTMENT TOPICAL at 08:24

## 2020-11-19 RX ADMIN — PANTOPRAZOLE SODIUM 40 MG: 40 TABLET, DELAYED RELEASE ORAL at 05:56

## 2020-11-19 RX ADMIN — GABAPENTIN 300 MG: 300 CAPSULE ORAL at 21:17

## 2020-11-19 RX ADMIN — INSULIN GLARGINE 36 UNITS: 100 INJECTION, SOLUTION SUBCUTANEOUS at 21:29

## 2020-11-19 RX ADMIN — INSULIN LISPRO 10 UNITS: 100 INJECTION, SOLUTION INTRAVENOUS; SUBCUTANEOUS at 18:10

## 2020-11-19 RX ADMIN — GABAPENTIN 300 MG: 300 CAPSULE ORAL at 08:18

## 2020-11-19 RX ADMIN — ACETAMINOPHEN 650 MG: 325 TABLET ORAL at 23:29

## 2020-11-19 RX ADMIN — TRAMADOL HYDROCHLORIDE 50 MG: 50 TABLET ORAL at 19:01

## 2020-11-19 RX ADMIN — BUMETANIDE 2 MG: 1 TABLET ORAL at 08:18

## 2020-11-19 RX ADMIN — INSULIN LISPRO 12 UNITS: 100 INJECTION, SOLUTION INTRAVENOUS; SUBCUTANEOUS at 18:09

## 2020-11-19 RX ADMIN — Medication: at 08:20

## 2020-11-19 RX ADMIN — INSULIN LISPRO 12 UNITS: 100 INJECTION, SOLUTION INTRAVENOUS; SUBCUTANEOUS at 12:23

## 2020-11-19 RX ADMIN — MONTELUKAST 10 MG: 10 TABLET, FILM COATED ORAL at 21:17

## 2020-11-19 RX ADMIN — SPIRONOLACTONE 25 MG: 25 TABLET ORAL at 08:18

## 2020-11-19 RX ADMIN — Medication 10 ML: at 21:32

## 2020-11-19 RX ADMIN — ACETAMINOPHEN 650 MG: 325 TABLET ORAL at 05:56

## 2020-11-19 RX ADMIN — DILTIAZEM HYDROCHLORIDE 240 MG: 240 CAPSULE, EXTENDED RELEASE ORAL at 08:18

## 2020-11-19 RX ADMIN — INSULIN LISPRO 10 UNITS: 100 INJECTION, SOLUTION INTRAVENOUS; SUBCUTANEOUS at 08:20

## 2020-11-19 RX ADMIN — APIXABAN 5 MG: 5 TABLET, FILM COATED ORAL at 08:17

## 2020-11-19 RX ADMIN — TRAZODONE HYDROCHLORIDE 50 MG: 50 TABLET ORAL at 21:18

## 2020-11-19 RX ADMIN — GABAPENTIN 300 MG: 300 CAPSULE ORAL at 14:33

## 2020-11-19 RX ADMIN — INSULIN LISPRO 10 UNITS: 100 INJECTION, SOLUTION INTRAVENOUS; SUBCUTANEOUS at 08:23

## 2020-11-19 RX ADMIN — MICONAZOLE NITRATE: 20.6 POWDER TOPICAL at 21:32

## 2020-11-19 RX ADMIN — INSULIN LISPRO 10 UNITS: 100 INJECTION, SOLUTION INTRAVENOUS; SUBCUTANEOUS at 12:21

## 2020-11-19 RX ADMIN — BUMETANIDE 2 MG: 1 TABLET ORAL at 21:17

## 2020-11-19 RX ADMIN — TRAMADOL HYDROCHLORIDE 100 MG: 50 TABLET ORAL at 05:56

## 2020-11-19 RX ADMIN — OMEGA-3-ACID ETHYL ESTERS 2 G: 1 CAPSULE, LIQUID FILLED ORAL at 21:18

## 2020-11-19 RX ADMIN — FERROUS SULFATE TAB 325 MG (65 MG ELEMENTAL FE) 325 MG: 325 (65 FE) TAB at 08:18

## 2020-11-19 RX ADMIN — ESCITALOPRAM 20 MG: 10 TABLET, FILM COATED ORAL at 08:17

## 2020-11-19 RX ADMIN — Medication 10 ML: at 08:17

## 2020-11-19 ASSESSMENT — PAIN SCALES - GENERAL
PAINLEVEL_OUTOF10: 2
PAINLEVEL_OUTOF10: 8
PAINLEVEL_OUTOF10: 8
PAINLEVEL_OUTOF10: 5
PAINLEVEL_OUTOF10: 10
PAINLEVEL_OUTOF10: 6
PAINLEVEL_OUTOF10: 4
PAINLEVEL_OUTOF10: 8
PAINLEVEL_OUTOF10: 0

## 2020-11-19 ASSESSMENT — PAIN DESCRIPTION - DESCRIPTORS
DESCRIPTORS: ACHING;CONSTANT;DISCOMFORT
DESCRIPTORS: ACHING;CONSTANT;DISCOMFORT
DESCRIPTORS: ACHING;DISCOMFORT;SORE
DESCRIPTORS: ACHING;SHARP;SORE

## 2020-11-19 ASSESSMENT — PAIN DESCRIPTION - PAIN TYPE
TYPE: ACUTE PAIN;CHRONIC PAIN
TYPE: ACUTE PAIN

## 2020-11-19 ASSESSMENT — PAIN DESCRIPTION - FREQUENCY
FREQUENCY: CONTINUOUS

## 2020-11-19 ASSESSMENT — PAIN DESCRIPTION - ONSET
ONSET: ON-GOING
ONSET: AWAKENED FROM SLEEP

## 2020-11-19 ASSESSMENT — PAIN DESCRIPTION - PROGRESSION
CLINICAL_PROGRESSION: NOT CHANGED

## 2020-11-19 ASSESSMENT — PAIN DESCRIPTION - LOCATION
LOCATION: FOOT;LEG
LOCATION: FOOT
LOCATION: FOOT;LEG
LOCATION: FOOT;LEG

## 2020-11-19 ASSESSMENT — PAIN - FUNCTIONAL ASSESSMENT
PAIN_FUNCTIONAL_ASSESSMENT: PREVENTS OR INTERFERES SOME ACTIVE ACTIVITIES AND ADLS

## 2020-11-19 ASSESSMENT — PAIN DESCRIPTION - ORIENTATION
ORIENTATION: RIGHT
ORIENTATION: RIGHT;LEFT

## 2020-11-19 NOTE — PROGRESS NOTES
Met with patient. She just finished her lunch and was alert. Chart reviewed. Education provided regarding current regimen using Lantus including onset, duration of effect and peak of insulin dose. Hypoglycemia signs, symptoms and treatments reviewed and literature provided. Patient instructed on the preparation and injection of insulin dose via pen method. Patient completed a return demonstration of the proper assemble of pen and injection technique using the injection pillow. Although she completed the task with some help, she had difficulty turning the pen for dosing and using her thumb to inject smoothly and injected only a. Patient verbalized understanding of site rotation, storage and proper sharps disposal.     Instructed patient to always seek guidance from their PCP for any adjustment. Floor nursing should continue to have patient practice insulin self-injection when insulin dose is due and document in the progress notes or document refusals of insulin self-injection practice.

## 2020-11-19 NOTE — PROGRESS NOTES
Progress note hospitalist service      Patient admitted overnight by hospital service  Details of the admission as outlined below  Chart reviewed patient interviewed and examined    Chief Complaint: Burning pain both legs    History Of Present Illness:     75 y.o. female with PMH of asthma/COPD with chronic respiratory failure requiring 4 L oxygen chronically; hypertension, atrial fibrillation, diabetes mellitus, bilateral lower extremity lymphedema with chronic wound who presented with burning pain on bilateral legs for the past few days. She also reports worsening fatigue, generalized weakness and not been having difficulty making it to the bathroom resulting in accidents. Patient states she had not used insulin for several days because she does not know how to use the insulin pen on which she was just started. She denies any chest pain, shortness of breath, palpitation; no fever/chill, abdominal pain, diarrhea, or dysuria. Patient is also complaining of severe \"pain in my tailbone\"; knows she has done ulcer for some time.     On arrival to the emergency department, she was found to have profound hyperglycemia with glucose level at 535 but no evidence of DKA    11/19  Patient feels fine  Patient has been evaluated by wound care service orders are reviewed  She is still running 300+ consistently  No change in oxygen demand and vitals are stable      Past Medical History:          Diagnosis Date    (HFpEF) heart failure with preserved ejection fraction ([facility]     1/16/2018- echo- LVEF 55-65%    Anal fissure     Anemia 10/6/2017    Anxiety and depression     Arthritis     Asthma     Atrial fibrillation ([address]     Eliquis    Blood transfusion     long time no reaction    CHF (congestive heart failure) (HCC)     Diastolic    COPD (chronic obstructive pulmonary disease) ([address]     Disc disorder     DM2 (diabetes mellitus, type 2) ([address]     on insulin    Fall due to stumbling 10/6/2017    GERD (gastroesophageal reflux disease)     Hx of blood clots     Hyperlipidemia     Hypertension     Inappropriate sinus tachycardia     LVH (left ventricular hypertrophy)     moderate    Lymphadenitis, chronic 4/13/2018    Occipital neuralgia 11/2/2011    Respiratory acidosis 10/7/2017    Sinus tachycardia 2/16/2015       Past Surgical History:          Procedure Laterality Date    ANOSCOPY  10/25/2006    injection of anal sphincter with Botox, Franklyn Ortiz/Timmy, [facility]    ANOSCOPY  4/9/2007    injection of anal sphincter with Botox, Dr. Payton Fragoso, [address] [place] [address] ANOSCOPY  6/13/2007    injection of anal sphincter with Botox, Franklyn Rucker [facility]    ANUS SURGERY  4/4/2006    L[facility]  4/18/2012    anal exam and injection of Botox 100 units into anal sphincter, Laila Rucker, [facility]    APPENDECTOMY  1965   [address]    COLONOSCOPY  1/20/2004    cecal polyp, bx (no pathologic changes), Dr. Elizabet Andrew, [facility] COLONOSCOPY  8/11/2006    snare polypectomy terminal ileum polyp (granulation tissue polyp) and anal polyp (inflammatory/papilla), Dr. Payton Fragoso, [facility] COLONOSCOPY  3/23/2012    bx/cauterization proximal ascending colon polyp, injection of anal sphincter with Botox, Dr. Payton Fragoso, [address] REPLACEMENT  2003    [address] [facility] [place], SKIN, SUB-Q TISSUE,MUSCLE,=<20 SQ CM Left 11/30/2018    LEFT LEG EXCISIONAL  DEBRIDEMENT WITH TISSUE BIOPSY performed by Rosa Maria Park DPM at [address] [facility] ENDOSCOPY  1/20/2004    gastritis, bx (no H pylori), Dr. Elizabet Andrew, [address] ENDOSCOPY N/A 6/1/2018    EGD BIOPSY performed by Yoseph Parikh MD at [address] [facility] 11/9/2018    EGD BIOPSY performed by Yoseph Parikh MD at [facility] ENDOSCOPY       Allergies: Statins    Social History:      TOBACCO:   reports that she quit smoking about 15 years ago. Her smoking use included cigarettes. She started smoking about 41 years ago. She has a 37.50 pack-year smoking history. She has never used smokeless tobacco.  ETOH:   reports no history of alcohol use. REVIEW OF SYSTEMS:     Pertinent positives as noted in the HPI. All other systems reviewed and negative. PHYSICAL EXAM:    BP (!) 130/59   Pulse 87   Temp 96.8 °F (36 °C) (Temporal)   Resp 16   Ht 5' 3\" (1.6 m)   Wt 280 lb (127 kg)   LMP  (LMP Unknown)   SpO2 99%   BMI 49.60 kg/m²     General appearance:  Lying comfortably in bed. No apparent distress. Morbidly obese  HEENT:  Normal cephalic, atraumatic without obvious deformity. Pupils equal, round, and reactive to light. Extra ocular muscles intact. Conjunctivae/corneas clear. Neck: Supple, with full range of motion. No jugular venous distention. Trachea midline. Respiratory:  Clear to auscultation bilaterally. Cardiovascular: Normal S1/S2. Regular rhythm and rate. Abdomen: Soft, non-tender, non-distended with normal bowel sounds. Extremities: Lymphedema on bilateral lower extremities with chronic venous ulcers and severe skin changes bilaterally  Skin: Skin color, texture, turgor normal.  No rashes or lesions. Neurologic:  No focal deficit. Psychiatric:  Alert and oriented, thought content appropriate, normal insight  Peripheral Pulses: +2 palpable, equal bilaterally       CXR:   No results found.         Labs:     Recent Labs     11/17/20  1433 11/18/20  0624 11/19/20  0623   WBC 5.4 5.9 6.0   HGB 9.5* 10.1* 10.2*   HCT 29.9* 32.9* 33.2*    334 300     Recent Labs     11/17/20  1433 11/18/20  0624 11/19/20  0623    134 137   K 5.0 3.9 3.8   CL 92* 92* 90*   CO2 30* 32* 36*   BUN 13 8 13   CREATININE 0.6 0.6 0.8   CALCIUM 9.0 8.7 8.9     Recent Labs     11/17/20  1433   AST 8   ALT 7   BILITOT <0.2   ALKPHOS 108*     No results for in this patient's care. If you have any questions or concerns please feel free to contact me at [phone].

## 2020-11-19 NOTE — PROGRESS NOTES
OT BEDSIDE TREATMENT NOTE      Date:2020  Patient Name: Nery Blackwell  MRN: [id number]  : 1955  Room: [address] [place], [place] [address]     Evaluating OT: JUDY Meng, OTR/L   License #[id number]     Referring physician: Ron Brandt MD      AM-PAC Daily Activity Raw Score:      Recommended Adaptive Equipment: ww, toilet tongs        Diagnosis: Hyperglycemia       Surgery: none       Pertinent Medical History: Diabetes Type 2, Anemia, Arthritis, COPD, Asthma, D, hypertension, A Fib       Precautions:  Falls, Safety, 4 L O2, gerber        Home Living: Pt lives with her  and 2 grandchildren in a 2 story home with 15 PER. Pt. Has a jori going into her home. B & B on 2nd floor but patient is unable to go to the 2nd floor currently because the chair lift does not work.      Bathroom setup: Walk in shower. Has BSC. Pt. Stated that she sponge bathes after set-up     Equipment owned: [facility], rollator, reacher, w/c     Prior Level of Function: Assistance with ADLs, Assistance with IADLs; using rollator for in house mobility and w/c for community mobility.      Driving: No (Pt. Has family and friends for assistance)  Medication Management Self   Occupation: Pt. Did not state   Leisure:Likes to spend time with her grandchildren      Pain Level: 4/10 pain in R Leg and mod/severe pain with movement. RN notified. Cognition: A&O: 4/4; Follows 2-step step directions              Memory:  Fair               Sequencing:  Fair               Problem solving:  Fair               Judgement/safety:  Fair                      Functional Assessment:     Initial Eval Status  Date: 20 Treatment Status  Date: 20 STGs = LTGs  Time frame: 2-4 days    Feeding Independent  n/t  Independent   Grooming Set-up to comb hair at EOB  Set up demo'd sitting EOB to comb hair  Mod I  when standing with ww   UB Dressing Set-up simulated n/t Independent   LB Dressing Dependent   Pt.  Had severe edema in B/L LEs  MAX A to sidney/doff pants requiring assist to thread B LE using reacher, pull up around waist while standing, maintain standing balance to complete task v/c's for technique using AE   Mod I with AE prn    Bathing Max A     n/t Mod I seated    Toileting Max A has catheter      Pt. Stated that she is not able to complete hygiene n/t Mod I with BSC   Bed Mobility  Supine to sit: Min A     Sit to supine: NT      Log Rolling: NT Supine>sit MIN A   Sit>supine MIN A   Pt requiring assist with R LE v/c's for technique and hand placement  Supine to sit: Independent   Sit to supine: Independent       Functional Transfers    Sit > Stand: Min A with ww    Stand > Sit: Min A with ww    SPT: Min A with ww Sit<>stand MIN A from EOB to w/w x3 v/c's for hand placement  Mod I with ww for all functional transfers. Functional Mobility NT  MIN A with w/w side stepping to HOB  Mod I with ww  for all functional distances. Balance Sitting:        Static:  SBA     Dynamic:SBA      Standing: Min A with ww  Sitting:   Static: SBA   Dynamic: SBA     Standing: MIN A with w/w  Sitting:        Static:  Independent    Dynamic:Independent      Standing: Min A with ww   Activity Tolerance Poor with light activity. Pt. Was limited by severe pain in B/L LEs.    99 % Sp O2 on 4 L O2  97 bpm    Fair- light ADL activity  Good with moderate activity    Visual/  Perceptual Glasses: Yes Readers      Vision: [facility]          Safety Fair with min/mod verbal cues for sequencing with ww, hand placement and safety.    Good with moderate activity          Comments: Upon arrival pt supine in bed agreeable to therapy session. Pt educated with regards to bed mobility, functional transfers, functional mobility, hand placement, walker safety, LE dressing AE, DME, bathing AE, fall prevention. At end of session pt returned to supine in bed with HOB elevated,  all lines and tubes intact, call light within reach. · Pt has made fair  progress towards set goals.    · Continue with current plan of care      Treatment Time In:1530            Treatment Time Out: 1600                Treatment Charges: Mins Units   Ther Ex  32553     Manual Therapy 26992     Thera Activities 27188 15 1   ADL/Home Mgt 59774 15 1   Neuro Re-ed 30216     Group Therapy      Orthotic manage/training  74779     Non-Billable Time     Total Timed Treatment [address] PARIKH/L [id number]

## 2020-11-19 NOTE — PROGRESS NOTES
Education given regarding blood sugar and constant snacking, patient does not listen to any education given to her.

## 2020-11-19 NOTE — PROGRESS NOTES
Patient requesting snacks. This nurse has told the patient constant times throughout the shift that she needs to control her BS. \"I don't understand why its high, I'm getting insulin right? \" Tried to educate patient on insulin along with a carb control diet.  Patient states understanding but continues to ask for snacks

## 2020-11-19 NOTE — CARE COORDINATION
Discharge plan is to return home when medically stable. The pt has [facility]. Will need transportation home. The pt's insurance, [employer], will provide transportation. Need O2 tank for transport.  Imer Chandler RN

## 2020-11-20 LAB
METER GLUCOSE: 366 MG/DL (ref 74–99)
METER GLUCOSE: 374 MG/DL (ref 74–99)
METER GLUCOSE: 406 MG/DL (ref 74–99)
METER GLUCOSE: 427 MG/DL (ref 74–99)

## 2020-11-20 PROCEDURE — 6370000000 HC RX 637 (ALT 250 FOR IP): Performed by: HOSPITALIST

## 2020-11-20 PROCEDURE — 2580000003 HC RX 258: Performed by: HOSPITALIST

## 2020-11-20 PROCEDURE — G0378 HOSPITAL OBSERVATION PER HR: HCPCS

## 2020-11-20 PROCEDURE — 6370000000 HC RX 637 (ALT 250 FOR IP): Performed by: INTERNAL MEDICINE

## 2020-11-20 PROCEDURE — 2700000000 HC OXYGEN THERAPY PER DAY

## 2020-11-20 PROCEDURE — 82962 GLUCOSE BLOOD TEST: CPT

## 2020-11-20 RX ORDER — INSULIN GLARGINE 100 [IU]/ML
30 INJECTION, SOLUTION SUBCUTANEOUS 2 TIMES DAILY
Status: DISCONTINUED | OUTPATIENT
Start: 2020-11-20 | End: 2020-11-21

## 2020-11-20 RX ADMIN — MICONAZOLE NITRATE: 20.6 POWDER TOPICAL at 09:02

## 2020-11-20 RX ADMIN — GABAPENTIN 300 MG: 300 CAPSULE ORAL at 08:57

## 2020-11-20 RX ADMIN — ACETAMINOPHEN 650 MG: 325 TABLET ORAL at 22:57

## 2020-11-20 RX ADMIN — MICONAZOLE NITRATE: 20.6 POWDER TOPICAL at 20:26

## 2020-11-20 RX ADMIN — INSULIN GLARGINE 30 UNITS: 100 INJECTION, SOLUTION SUBCUTANEOUS at 20:34

## 2020-11-20 RX ADMIN — PANTOPRAZOLE SODIUM 40 MG: 40 TABLET, DELAYED RELEASE ORAL at 08:58

## 2020-11-20 RX ADMIN — PETROLATUM: 42 OINTMENT TOPICAL at 09:02

## 2020-11-20 RX ADMIN — BUMETANIDE 2 MG: 1 TABLET ORAL at 08:58

## 2020-11-20 RX ADMIN — INSULIN LISPRO 10 UNITS: 100 INJECTION, SOLUTION INTRAVENOUS; SUBCUTANEOUS at 11:43

## 2020-11-20 RX ADMIN — INSULIN LISPRO 12 UNITS: 100 INJECTION, SOLUTION INTRAVENOUS; SUBCUTANEOUS at 11:41

## 2020-11-20 RX ADMIN — BUMETANIDE 2 MG: 1 TABLET ORAL at 20:25

## 2020-11-20 RX ADMIN — TRAZODONE HYDROCHLORIDE 50 MG: 50 TABLET ORAL at 20:25

## 2020-11-20 RX ADMIN — MONTELUKAST 10 MG: 10 TABLET, FILM COATED ORAL at 20:25

## 2020-11-20 RX ADMIN — INSULIN LISPRO 10 UNITS: 100 INJECTION, SOLUTION INTRAVENOUS; SUBCUTANEOUS at 09:03

## 2020-11-20 RX ADMIN — Medication: at 09:02

## 2020-11-20 RX ADMIN — GABAPENTIN 300 MG: 300 CAPSULE ORAL at 15:16

## 2020-11-20 RX ADMIN — TRAMADOL HYDROCHLORIDE 100 MG: 50 TABLET ORAL at 20:25

## 2020-11-20 RX ADMIN — SPIRONOLACTONE 25 MG: 25 TABLET ORAL at 08:58

## 2020-11-20 RX ADMIN — APIXABAN 5 MG: 5 TABLET, FILM COATED ORAL at 20:25

## 2020-11-20 RX ADMIN — TRAMADOL HYDROCHLORIDE 50 MG: 50 TABLET ORAL at 08:58

## 2020-11-20 RX ADMIN — FERROUS SULFATE TAB 325 MG (65 MG ELEMENTAL FE) 325 MG: 325 (65 FE) TAB at 08:59

## 2020-11-20 RX ADMIN — OMEGA-3-ACID ETHYL ESTERS 2 G: 1 CAPSULE, LIQUID FILLED ORAL at 08:58

## 2020-11-20 RX ADMIN — Medication: at 20:26

## 2020-11-20 RX ADMIN — DILTIAZEM HYDROCHLORIDE 240 MG: 240 CAPSULE, EXTENDED RELEASE ORAL at 08:58

## 2020-11-20 RX ADMIN — INSULIN LISPRO 10 UNITS: 100 INJECTION, SOLUTION INTRAVENOUS; SUBCUTANEOUS at 16:39

## 2020-11-20 RX ADMIN — Medication 10 ML: at 20:26

## 2020-11-20 RX ADMIN — METOPROLOL SUCCINATE 100 MG: 50 TABLET, EXTENDED RELEASE ORAL at 08:57

## 2020-11-20 RX ADMIN — Medication 10 ML: at 08:57

## 2020-11-20 RX ADMIN — ESCITALOPRAM 20 MG: 10 TABLET, FILM COATED ORAL at 08:57

## 2020-11-20 RX ADMIN — INSULIN LISPRO 6 UNITS: 100 INJECTION, SOLUTION INTRAVENOUS; SUBCUTANEOUS at 20:33

## 2020-11-20 RX ADMIN — GABAPENTIN 300 MG: 300 CAPSULE ORAL at 20:25

## 2020-11-20 RX ADMIN — APIXABAN 5 MG: 5 TABLET, FILM COATED ORAL at 08:58

## 2020-11-20 RX ADMIN — INSULIN LISPRO 10 UNITS: 100 INJECTION, SOLUTION INTRAVENOUS; SUBCUTANEOUS at 16:37

## 2020-11-20 RX ADMIN — OMEGA-3-ACID ETHYL ESTERS 2 G: 1 CAPSULE, LIQUID FILLED ORAL at 20:25

## 2020-11-20 RX ADMIN — ACETAMINOPHEN 650 MG: 325 TABLET ORAL at 16:36

## 2020-11-20 RX ADMIN — INSULIN LISPRO 10 UNITS: 100 INJECTION, SOLUTION INTRAVENOUS; SUBCUTANEOUS at 09:05

## 2020-11-20 ASSESSMENT — PAIN DESCRIPTION - ONSET
ONSET: ON-GOING

## 2020-11-20 ASSESSMENT — PAIN DESCRIPTION - FREQUENCY
FREQUENCY: CONTINUOUS

## 2020-11-20 ASSESSMENT — PAIN SCALES - GENERAL
PAINLEVEL_OUTOF10: 8
PAINLEVEL_OUTOF10: 3
PAINLEVEL_OUTOF10: 9
PAINLEVEL_OUTOF10: 8
PAINLEVEL_OUTOF10: 5
PAINLEVEL_OUTOF10: 0
PAINLEVEL_OUTOF10: 0
PAINLEVEL_OUTOF10: 5
PAINLEVEL_OUTOF10: 0

## 2020-11-20 ASSESSMENT — PAIN DESCRIPTION - DESCRIPTORS
DESCRIPTORS: ACHING;DISCOMFORT;THROBBING
DESCRIPTORS: ACHING;CONSTANT;DISCOMFORT
DESCRIPTORS: ACHING;DISCOMFORT;THROBBING
DESCRIPTORS: ACHING;CONSTANT;DISCOMFORT

## 2020-11-20 ASSESSMENT — PAIN DESCRIPTION - ORIENTATION
ORIENTATION: RIGHT;LEFT

## 2020-11-20 ASSESSMENT — PAIN DESCRIPTION - LOCATION
LOCATION: FOOT

## 2020-11-20 ASSESSMENT — PAIN DESCRIPTION - PROGRESSION
CLINICAL_PROGRESSION: NOT CHANGED
CLINICAL_PROGRESSION: NOT CHANGED

## 2020-11-20 ASSESSMENT — PAIN DESCRIPTION - PAIN TYPE
TYPE: CHRONIC PAIN
TYPE: ACUTE PAIN;CHRONIC PAIN

## 2020-11-20 NOTE — CARE COORDINATION
Discharge plan is home with family when medically stable.  this AM. Will need [facility] transportation [phone] to arrange for transportation home. Will need [facility] to deliver O2 tank for transport home [phone].  Will Mcgowan RN

## 2020-11-20 NOTE — CARE COORDINATION
Clinical update provided to [facility]. They would like the discharge summary faxed to [phone].  Omid Copeland RN

## 2020-11-20 NOTE — PROGRESS NOTES
(congestive heart failure) (HCC)     Diastolic    [facility]) [facility]     Disc disorder     DM2 (diabetes mellitus, type 2) ([facility]     on insulin    Fall due to stumbling 10/6/2017    GERD (gastroesophageal reflux disease)     Hx of blood clots     Hyperlipidemia     Hypertension     Inappropriate sinus tachycardia     LVH (left ventricular hypertrophy)     moderate    Lymphadenitis, chronic 4/13/2018    Occipital neuralgia 11/2/2011    Respiratory acidosis 10/7/2017    Sinus tachycardia 2/16/2015       Past Surgical History:          Procedure Laterality Date    ANOSCOPY  10/25/2006    injection of anal sphincter with Botox, Franklyn Ortiz/Timmy, [facility]    ANOSCOPY  4/9/2007    injection of anal sphincter with Botox, Dr. Johny Aguilar, [address] [place] [address] ANOSCOPY  6/13/2007    injection of anal sphincter with Botox, Franklyn Alfred [facility]    ANUS SURGERY  4/4/2006    [facility]  4/18/2012    anal exam and injection of Botox 100 units into anal sphincter, Laila Alfred President, [facility]    APPENDECTOMY  1965   [address]    COLONOSCOPY  1/20/2004    cecal polyp, bx (no pathologic changes), Dr. Dario More, [address] [place] [address] COLONOSCOPY  8/11/2006    snare polypectomy terminal ileum polyp (granulation tissue polyp) and anal polyp (inflammatory/papilla), Dr. Johny Aguilar, [address] [place] [address] COLONOSCOPY  3/23/2012    bx/cauterization proximal ascending colon polyp, injection of anal sphincter with Botox, Dr. Johny Aguilar, [address] [place], SKIN, SUB-Q TISSUE,MUSCLE,=<20 SQ CM Left 11/30/2018    LEFT LEG EXCISIONAL  DEBRIDEMENT WITH TISSUE BIOPSY performed by Nena Franks DPM at [address] [facility] END[facility]  1/20/2004    gastritis, bx (no H pylori), Dr. Dario More, [address] [place] [address] GASTROINTESTINAL ENDOSCOPY N/A 6/1/2018    EGD BIOPSY performed by Avinash Gould MD at [facility] N/A 11/9/2018    EGD BIOPSY performed by Avinash Gould MD at [facility] ENDOSCOPY       Allergies:  Statins    Social History:      TOBACCO:   reports that she quit smoking about 15 years ago. Her smoking use included cigarettes. She started smoking about 41 years ago. She has a 37.50 pack-year smoking history. She has never used smokeless tobacco.  ETOH:   reports no history of alcohol use. REVIEW OF SYSTEMS:     Pertinent positives as noted in the HPI. All other systems reviewed and negative. PHYSICAL EXAM:    /67   Pulse 87   Temp 96.8 °F (36 °C) (Temporal)   Resp 18   Ht 5' 3\" (1.6 m)   Wt 280 lb (127 kg)   LMP  (LMP Unknown)   SpO2 97%   BMI 49.60 kg/m²     General appearance:  Lying comfortably in bed. No apparent distress. Morbidly obese  HEENT:  Normal cephalic, atraumatic without obvious deformity. Pupils equal, round, and reactive to light. Extra ocular muscles intact. Conjunctivae/corneas clear. Neck: Supple, with full range of motion. No jugular venous distention. Trachea midline. Respiratory:  Clear to auscultation bilaterally. Cardiovascular: Normal S1/S2. Regular rhythm and rate. Abdomen: Soft, non-tender, non-distended with normal bowel sounds. Extremities: Lymphedema on bilateral lower extremities with chronic venous ulcers and severe skin changes bilaterally  Skin: Skin color, texture, turgor normal.  No rashes or lesions. Neurologic:  No focal deficit. Psychiatric:  Alert and oriented, thought content appropriate, normal insight  Peripheral Pulses: +2 palpable, equal bilaterally       CXR:   No results found.         Labs:     Recent Labs     11/17/20  1433 11/18/20  0624 11/19/20  0623   WBC 5.4 5.9 6.0   HGB 9.5* 10.1* 10.2*   HCT 29.9* 32.9* 33.2*    334 300     Recent Labs     11/17/20  1433 11/18/20  0624 11/19/20  0016 Dorothy Rodriguez MD 11/20/2020 9:33 AM    Thank you Sen Floyd DO for the opportunity to be involved in this patient's care. If you have any questions or concerns please feel free to contact me at [phone].

## 2020-11-21 LAB
METER GLUCOSE: 388 MG/DL (ref 74–99)
METER GLUCOSE: 396 MG/DL (ref 74–99)
METER GLUCOSE: 415 MG/DL (ref 74–99)
METER GLUCOSE: 426 MG/DL (ref 74–99)
METER GLUCOSE: 473 MG/DL (ref 74–99)

## 2020-11-21 PROCEDURE — 6370000000 HC RX 637 (ALT 250 FOR IP): Performed by: INTERNAL MEDICINE

## 2020-11-21 PROCEDURE — 2580000003 HC RX 258: Performed by: HOSPITALIST

## 2020-11-21 PROCEDURE — 2700000000 HC OXYGEN THERAPY PER DAY

## 2020-11-21 PROCEDURE — 82962 GLUCOSE BLOOD TEST: CPT

## 2020-11-21 PROCEDURE — 6370000000 HC RX 637 (ALT 250 FOR IP): Performed by: HOSPITALIST

## 2020-11-21 PROCEDURE — G0378 HOSPITAL OBSERVATION PER HR: HCPCS

## 2020-11-21 PROCEDURE — 6370000000 HC RX 637 (ALT 250 FOR IP): Performed by: FAMILY MEDICINE

## 2020-11-21 RX ORDER — INSULIN GLARGINE 100 [IU]/ML
37 INJECTION, SOLUTION SUBCUTANEOUS 2 TIMES DAILY
Status: DISCONTINUED | OUTPATIENT
Start: 2020-11-21 | End: 2020-11-22

## 2020-11-21 RX ADMIN — ESCITALOPRAM 20 MG: 10 TABLET, FILM COATED ORAL at 08:14

## 2020-11-21 RX ADMIN — TRAMADOL HYDROCHLORIDE 100 MG: 50 TABLET ORAL at 06:36

## 2020-11-21 RX ADMIN — TRAMADOL HYDROCHLORIDE 100 MG: 50 TABLET ORAL at 23:46

## 2020-11-21 RX ADMIN — INSULIN LISPRO 10 UNITS: 100 INJECTION, SOLUTION INTRAVENOUS; SUBCUTANEOUS at 16:57

## 2020-11-21 RX ADMIN — TRAMADOL HYDROCHLORIDE 50 MG: 50 TABLET ORAL at 16:52

## 2020-11-21 RX ADMIN — APIXABAN 5 MG: 5 TABLET, FILM COATED ORAL at 08:13

## 2020-11-21 RX ADMIN — ACETAMINOPHEN 650 MG: 325 TABLET ORAL at 19:43

## 2020-11-21 RX ADMIN — APIXABAN 5 MG: 5 TABLET, FILM COATED ORAL at 19:43

## 2020-11-21 RX ADMIN — INSULIN LISPRO 10 UNITS: 100 INJECTION, SOLUTION INTRAVENOUS; SUBCUTANEOUS at 11:46

## 2020-11-21 RX ADMIN — INSULIN GLARGINE 30 UNITS: 100 INJECTION, SOLUTION SUBCUTANEOUS at 08:22

## 2020-11-21 RX ADMIN — INSULIN LISPRO 10 UNITS: 100 INJECTION, SOLUTION INTRAVENOUS; SUBCUTANEOUS at 16:53

## 2020-11-21 RX ADMIN — MICONAZOLE NITRATE: 20.6 POWDER TOPICAL at 08:19

## 2020-11-21 RX ADMIN — INSULIN LISPRO 7 UNITS: 100 INJECTION, SOLUTION INTRAVENOUS; SUBCUTANEOUS at 22:42

## 2020-11-21 RX ADMIN — OMEGA-3-ACID ETHYL ESTERS 2 G: 1 CAPSULE, LIQUID FILLED ORAL at 19:42

## 2020-11-21 RX ADMIN — MONTELUKAST 10 MG: 10 TABLET, FILM COATED ORAL at 19:43

## 2020-11-21 RX ADMIN — INSULIN LISPRO 12 UNITS: 100 INJECTION, SOLUTION INTRAVENOUS; SUBCUTANEOUS at 08:23

## 2020-11-21 RX ADMIN — PETROLATUM: 42 OINTMENT TOPICAL at 08:17

## 2020-11-21 RX ADMIN — MICONAZOLE NITRATE: 20.6 POWDER TOPICAL at 19:46

## 2020-11-21 RX ADMIN — INSULIN LISPRO 6 UNITS: 100 INJECTION, SOLUTION INTRAVENOUS; SUBCUTANEOUS at 19:47

## 2020-11-21 RX ADMIN — Medication: at 19:46

## 2020-11-21 RX ADMIN — Medication 10 ML: at 08:13

## 2020-11-21 RX ADMIN — TRAZODONE HYDROCHLORIDE 50 MG: 50 TABLET ORAL at 19:42

## 2020-11-21 RX ADMIN — Medication: at 08:18

## 2020-11-21 RX ADMIN — GABAPENTIN 300 MG: 300 CAPSULE ORAL at 08:14

## 2020-11-21 RX ADMIN — METOPROLOL SUCCINATE 100 MG: 50 TABLET, EXTENDED RELEASE ORAL at 08:13

## 2020-11-21 RX ADMIN — SPIRONOLACTONE 25 MG: 25 TABLET ORAL at 08:13

## 2020-11-21 RX ADMIN — GABAPENTIN 300 MG: 300 CAPSULE ORAL at 19:43

## 2020-11-21 RX ADMIN — INSULIN LISPRO 12 UNITS: 100 INJECTION, SOLUTION INTRAVENOUS; SUBCUTANEOUS at 11:49

## 2020-11-21 RX ADMIN — INSULIN LISPRO 10 UNITS: 100 INJECTION, SOLUTION INTRAVENOUS; SUBCUTANEOUS at 08:22

## 2020-11-21 RX ADMIN — BUMETANIDE 2 MG: 1 TABLET ORAL at 19:42

## 2020-11-21 RX ADMIN — BUMETANIDE 2 MG: 1 TABLET ORAL at 08:13

## 2020-11-21 RX ADMIN — FERROUS SULFATE TAB 325 MG (65 MG ELEMENTAL FE) 325 MG: 325 (65 FE) TAB at 08:13

## 2020-11-21 RX ADMIN — INSULIN GLARGINE 37 UNITS: 100 INJECTION, SOLUTION SUBCUTANEOUS at 19:46

## 2020-11-21 RX ADMIN — OMEGA-3-ACID ETHYL ESTERS 2 G: 1 CAPSULE, LIQUID FILLED ORAL at 08:17

## 2020-11-21 RX ADMIN — Medication 10 ML: at 19:43

## 2020-11-21 RX ADMIN — DILTIAZEM HYDROCHLORIDE 240 MG: 240 CAPSULE, EXTENDED RELEASE ORAL at 08:14

## 2020-11-21 RX ADMIN — GABAPENTIN 300 MG: 300 CAPSULE ORAL at 13:57

## 2020-11-21 RX ADMIN — PANTOPRAZOLE SODIUM 40 MG: 40 TABLET, DELAYED RELEASE ORAL at 08:13

## 2020-11-21 RX ADMIN — ACETAMINOPHEN 650 MG: 325 TABLET ORAL at 06:36

## 2020-11-21 ASSESSMENT — PAIN DESCRIPTION - PROGRESSION
CLINICAL_PROGRESSION: NOT CHANGED
CLINICAL_PROGRESSION: NOT CHANGED

## 2020-11-21 ASSESSMENT — PAIN DESCRIPTION - DESCRIPTORS
DESCRIPTORS: ACHING;DISCOMFORT;THROBBING
DESCRIPTORS: ACHING;SHARP;SHOOTING
DESCRIPTORS: ACHING;SHARP;SHOOTING
DESCRIPTORS: ACHING

## 2020-11-21 ASSESSMENT — PAIN DESCRIPTION - ONSET
ONSET: ON-GOING

## 2020-11-21 ASSESSMENT — PAIN SCALES - GENERAL
PAINLEVEL_OUTOF10: 0
PAINLEVEL_OUTOF10: 10
PAINLEVEL_OUTOF10: 5
PAINLEVEL_OUTOF10: 9
PAINLEVEL_OUTOF10: 3
PAINLEVEL_OUTOF10: 4
PAINLEVEL_OUTOF10: 10
PAINLEVEL_OUTOF10: 4
PAINLEVEL_OUTOF10: 4

## 2020-11-21 ASSESSMENT — PAIN DESCRIPTION - PAIN TYPE
TYPE: ACUTE PAIN
TYPE: ACUTE PAIN;CHRONIC PAIN

## 2020-11-21 ASSESSMENT — PAIN DESCRIPTION - LOCATION
LOCATION: FOOT

## 2020-11-21 ASSESSMENT — PAIN DESCRIPTION - ORIENTATION
ORIENTATION: RIGHT;LEFT

## 2020-11-21 ASSESSMENT — PAIN DESCRIPTION - FREQUENCY
FREQUENCY: CONTINUOUS

## 2020-11-21 NOTE — PROGRESS NOTES
Nutrition Note    Recommend modifying nutritional supplementation per physician consult. Recommend discontinuing Glucerna supplement d/t patients po intake being adequate. Recommend to continue Fabrizio wound healing supplement BID to help assist with proper wound healing. Estimated Daily Nutrient Needs:  Energy (kcal):  4117-3102 (1791 x 1.2 SF); Weight Used for Energy Requirements:  Current     Protein (g):  125-135g (2.5g/kg IBW);  Weight Used for Protein Requirements:  Ideal        Fluid (ml/day):  1500-1800ml; Method Used for Fluid Requirements:  1 ml/kcal      Electronically signed by Matthew Palacios RD, RICH on 11/21/20 at 10:40 AM EST    Contact: [phone]

## 2020-11-21 NOTE — PROGRESS NOTES
Progress note hospitalist service      Patient admitted overnight by hospital service  Details of the admission as outlined below  Chart reviewed patient interviewed and examined    Chief Complaint: Burning pain both legs    History Of Present Illness:     75 y.o. female with PMH of asthma/COPD with chronic respiratory failure requiring 4 L oxygen chronically; hypertension, atrial fibrillation, diabetes mellitus, bilateral lower extremity lymphedema with chronic wound who presented with burning pain on bilateral legs for the past few days. She also reports worsening fatigue, generalized weakness and not been having difficulty making it to the bathroom resulting in accidents. Patient states she had not used insulin for several days because she does not know how to use the insulin pen on which she was just started. She denies any chest pain, shortness of breath, palpitation; no fever/chill, abdominal pain, diarrhea, or dysuria. Patient is also complaining of severe \"pain in my tailbone\"; knows she has done ulcer for some time.     On arrival to the emergency department, she was found to have profound hyperglycemia with glucose level at 535 but no evidence of DKA    11/19  Patient feels fine  Patient has been evaluated by wound care service orders are reviewed  She is still running 300+ consistently  No change in oxygen demand and vitals are stable    11/20  Has been a resurgence in her glycemic level  Insulin dosing has been reviewed and revised  She feels fine  Has not yet been since ambulated according to her report however we will have physical therapy again see her    11/21  Her glycemia is still not well controlled  We will have dietary reviewed the diet for total caloric intake noting that she is receiving 2 dietary supplements and appears to be eating all of her food      Past Medical History:          Diagnosis Date    (HFpEF) heart failure with preserved ejection fraction ([facility]     1/16/2018- echo- Labs     11/19/20  0623   WBC 6.0   HGB 10.2*   HCT 33.2*        Recent Labs     11/19/20  0623      K 3.8   CL 90*   CO2 36*   BUN 13   CREATININE 0.8   CALCIUM 8.9     No results for input(s): AST, ALT, BILIDIR, BILITOT, ALKPHOS in the last 72 hours. No results for input(s): INR in the last 72 hours. No results for input(s): Jadene Moh in the last 72 hours. Urinalysis:      Lab Results   Component Value Date    NITRU Negative 11/17/2020    WBCUA NONE 11/17/2020    BACTERIA NONE SEEN 11/17/2020    RBCUA 0-1 11/17/2020    BLOODU TRACE-INTACT 11/17/2020    SPECGRAV 1.010 11/17/2020    GLUCOSEU >=1000 11/17/2020             Active Hospital Problems    Diagnosis Date Noted    Hyperglycemia [R73.9] 11/17/2020    Chronic respiratory failure with hypoxia ([facility]) [J96.11] 05/26/2020    Uncontrolled diabetes mellitus ([facility] [E11.65] 11/12/2010     ASSESSMENT:  1. Uncontrolled type II diabetes mellitus -she remains hyperglycemic and we will start Lantus twice daily with adjusted dose  2. Chronic venous ulcers on bilateral lower extremity associated with lymphedema  3. Stage I sacral wound (POA) - causing significant pain wound care has evaluated  4. Chronic respiratory failure with hypoxia clinically stable  5. Chronic diastolic CHF clinically stable  6. Asthma/COPD  7. Atrial fibrillation  8. Chronic anticoagulation  9. Hypertension  10. Morbid obesity - Body mass index is 49.99 kg/m².    9.  Physical deconditioning    PLAN:  Adjusted basal insulin further to twice daily now increased to 38 units  Glycemia remains poorly controlled  He is also receiving 2 nutritional supplements and appears to be eating all of her meals  Would request dietary to evaluate total caloric intake    Wound care consult-has been completed and reviewed  She requires 4 L nasal cannula  Social service to start discharge planning soon as possible  Maintain Bumex diuretic therapy and anticoagulation  Monitor electrolytes and blood count    PRN analgesia  Resume home medications  PT/OT eval      DVT Prophylaxis: On Eliquis  Diet: DIET CARB CONTROL; Dietary Nutrition Supplements: Diabetic Oral Supplement  Dietary Nutrition Supplements: Wound Healing Oral Supplement  Code Status: Full Code      Dispo - home with Alvaro Del Castillo once stable       Hailee Damon MD 11/21/2020 8:42 AM    Thank you Mary Kay Camejo DO for the opportunity to be involved in this patient's care. If you have any questions or concerns please feel free to contact me at [phone].

## 2020-11-22 LAB
METER GLUCOSE: 275 MG/DL (ref 74–99)
METER GLUCOSE: 316 MG/DL (ref 74–99)
METER GLUCOSE: 327 MG/DL (ref 74–99)
METER GLUCOSE: 330 MG/DL (ref 74–99)
METER GLUCOSE: 447 MG/DL (ref 74–99)

## 2020-11-22 PROCEDURE — 6370000000 HC RX 637 (ALT 250 FOR IP): Performed by: HOSPITALIST

## 2020-11-22 PROCEDURE — G0378 HOSPITAL OBSERVATION PER HR: HCPCS

## 2020-11-22 PROCEDURE — 6370000000 HC RX 637 (ALT 250 FOR IP): Performed by: FAMILY MEDICINE

## 2020-11-22 PROCEDURE — 2700000000 HC OXYGEN THERAPY PER DAY

## 2020-11-22 PROCEDURE — 2580000003 HC RX 258: Performed by: HOSPITALIST

## 2020-11-22 PROCEDURE — 6370000000 HC RX 637 (ALT 250 FOR IP): Performed by: INTERNAL MEDICINE

## 2020-11-22 PROCEDURE — 82962 GLUCOSE BLOOD TEST: CPT

## 2020-11-22 RX ORDER — INSULIN GLARGINE 100 [IU]/ML
45 INJECTION, SOLUTION SUBCUTANEOUS 2 TIMES DAILY
Status: DISCONTINUED | OUTPATIENT
Start: 2020-11-22 | End: 2020-11-23

## 2020-11-22 RX ADMIN — OMEGA-3-ACID ETHYL ESTERS 2 G: 1 CAPSULE, LIQUID FILLED ORAL at 21:40

## 2020-11-22 RX ADMIN — INSULIN LISPRO 15 UNITS: 100 INJECTION, SOLUTION INTRAVENOUS; SUBCUTANEOUS at 11:54

## 2020-11-22 RX ADMIN — GABAPENTIN 300 MG: 300 CAPSULE ORAL at 10:03

## 2020-11-22 RX ADMIN — BUMETANIDE 2 MG: 1 TABLET ORAL at 10:01

## 2020-11-22 RX ADMIN — INSULIN LISPRO 15 UNITS: 100 INJECTION, SOLUTION INTRAVENOUS; SUBCUTANEOUS at 10:05

## 2020-11-22 RX ADMIN — PANTOPRAZOLE SODIUM 40 MG: 40 TABLET, DELAYED RELEASE ORAL at 06:17

## 2020-11-22 RX ADMIN — MICONAZOLE NITRATE: 20.6 POWDER TOPICAL at 10:13

## 2020-11-22 RX ADMIN — Medication: at 21:41

## 2020-11-22 RX ADMIN — TRAMADOL HYDROCHLORIDE 100 MG: 50 TABLET ORAL at 10:03

## 2020-11-22 RX ADMIN — BUMETANIDE 2 MG: 1 TABLET ORAL at 21:40

## 2020-11-22 RX ADMIN — METOPROLOL SUCCINATE 100 MG: 50 TABLET, EXTENDED RELEASE ORAL at 09:59

## 2020-11-22 RX ADMIN — APIXABAN 5 MG: 5 TABLET, FILM COATED ORAL at 10:00

## 2020-11-22 RX ADMIN — INSULIN GLARGINE 45 UNITS: 100 INJECTION, SOLUTION SUBCUTANEOUS at 21:47

## 2020-11-22 RX ADMIN — MONTELUKAST 10 MG: 10 TABLET, FILM COATED ORAL at 21:40

## 2020-11-22 RX ADMIN — OMEGA-3-ACID ETHYL ESTERS 2 G: 1 CAPSULE, LIQUID FILLED ORAL at 10:00

## 2020-11-22 RX ADMIN — APIXABAN 5 MG: 5 TABLET, FILM COATED ORAL at 21:40

## 2020-11-22 RX ADMIN — INSULIN GLARGINE 45 UNITS: 100 INJECTION, SOLUTION SUBCUTANEOUS at 10:11

## 2020-11-22 RX ADMIN — MICONAZOLE NITRATE: 20.6 POWDER TOPICAL at 21:41

## 2020-11-22 RX ADMIN — INSULIN LISPRO 6 UNITS: 100 INJECTION, SOLUTION INTRAVENOUS; SUBCUTANEOUS at 21:47

## 2020-11-22 RX ADMIN — GABAPENTIN 300 MG: 300 CAPSULE ORAL at 13:18

## 2020-11-22 RX ADMIN — INSULIN LISPRO 15 UNITS: 100 INJECTION, SOLUTION INTRAVENOUS; SUBCUTANEOUS at 10:10

## 2020-11-22 RX ADMIN — Medication 10 ML: at 10:14

## 2020-11-22 RX ADMIN — TRAZODONE HYDROCHLORIDE 50 MG: 50 TABLET ORAL at 21:40

## 2020-11-22 RX ADMIN — DILTIAZEM HYDROCHLORIDE 240 MG: 240 CAPSULE, EXTENDED RELEASE ORAL at 10:00

## 2020-11-22 RX ADMIN — PETROLATUM: 42 OINTMENT TOPICAL at 10:14

## 2020-11-22 RX ADMIN — SPIRONOLACTONE 25 MG: 25 TABLET ORAL at 10:01

## 2020-11-22 RX ADMIN — INSULIN LISPRO 9 UNITS: 100 INJECTION, SOLUTION INTRAVENOUS; SUBCUTANEOUS at 17:39

## 2020-11-22 RX ADMIN — ACETAMINOPHEN 650 MG: 325 TABLET ORAL at 21:40

## 2020-11-22 RX ADMIN — Medication: at 10:13

## 2020-11-22 RX ADMIN — FERROUS SULFATE TAB 325 MG (65 MG ELEMENTAL FE) 325 MG: 325 (65 FE) TAB at 10:02

## 2020-11-22 RX ADMIN — ESCITALOPRAM 20 MG: 10 TABLET, FILM COATED ORAL at 10:00

## 2020-11-22 RX ADMIN — GABAPENTIN 300 MG: 300 CAPSULE ORAL at 21:40

## 2020-11-22 RX ADMIN — Medication 10 ML: at 21:40

## 2020-11-22 RX ADMIN — INSULIN LISPRO 18 UNITS: 100 INJECTION, SOLUTION INTRAVENOUS; SUBCUTANEOUS at 11:51

## 2020-11-22 RX ADMIN — INSULIN LISPRO 15 UNITS: 100 INJECTION, SOLUTION INTRAVENOUS; SUBCUTANEOUS at 17:41

## 2020-11-22 ASSESSMENT — PAIN DESCRIPTION - DESCRIPTORS: DESCRIPTORS: ACHING;SHARP;SHOOTING

## 2020-11-22 ASSESSMENT — PAIN SCALES - GENERAL
PAINLEVEL_OUTOF10: 7
PAINLEVEL_OUTOF10: 5
PAINLEVEL_OUTOF10: 8
PAINLEVEL_OUTOF10: 2
PAINLEVEL_OUTOF10: 7

## 2020-11-22 ASSESSMENT — PAIN DESCRIPTION - PROGRESSION: CLINICAL_PROGRESSION: NOT CHANGED

## 2020-11-22 ASSESSMENT — PAIN DESCRIPTION - ORIENTATION: ORIENTATION: RIGHT;LEFT

## 2020-11-22 ASSESSMENT — PAIN DESCRIPTION - ONSET: ONSET: ON-GOING

## 2020-11-22 ASSESSMENT — PAIN DESCRIPTION - PAIN TYPE: TYPE: ACUTE PAIN

## 2020-11-22 ASSESSMENT — PAIN DESCRIPTION - FREQUENCY: FREQUENCY: CONTINUOUS

## 2020-11-22 ASSESSMENT — PAIN DESCRIPTION - LOCATION: LOCATION: FOOT

## 2020-11-22 ASSESSMENT — PAIN - FUNCTIONAL ASSESSMENT: PAIN_FUNCTIONAL_ASSESSMENT: PREVENTS OR INTERFERES SOME ACTIVE ACTIVITIES AND ADLS

## 2020-11-22 NOTE — PROGRESS NOTES
Education given to patient regarding her BS and snacking.  Patient does not listen and continues to ask for snacks despite her BS being 415

## 2020-11-22 NOTE — PROGRESS NOTES
Patient is already on the phone with family member and declined for this nurse to call family for an update

## 2020-11-22 NOTE — PROGRESS NOTES
Progress note hospitalist service      Patient admitted overnight by hospital service  Details of the admission as outlined below  Chart reviewed patient interviewed and examined    Chief Complaint: Burning pain both legs    History Of Present Illness:     75 y.o. female with PMH of asthma/COPD with chronic respiratory failure requiring 4 L oxygen chronically; hypertension, atrial fibrillation, diabetes mellitus, bilateral lower extremity lymphedema with chronic wound who presented with burning pain on bilateral legs for the past few days. She also reports worsening fatigue, generalized weakness and not been having difficulty making it to the bathroom resulting in accidents. Patient states she had not used insulin for several days because she does not know how to use the insulin pen on which she was just started. She denies any chest pain, shortness of breath, palpitation; no fever/chill, abdominal pain, diarrhea, or dysuria. Patient is also complaining of severe \"pain in my tailbone\"; knows she has done ulcer for some time.     On arrival to the emergency department, she was found to have profound hyperglycemia with glucose level at 535 but no evidence of DKA    11/19  Patient feels fine  Patient has been evaluated by wound care service orders are reviewed  She is still running 300+ consistently  No change in oxygen demand and vitals are stable    11/20  Has been a resurgence in her glycemic level  Insulin dosing has been reviewed and revised  She feels fine  Has not yet been since ambulated according to her report however we will have physical therapy again see her    11/21  Her glycemia is still not well controlled  We will have dietary reviewed the diet for total caloric intake noting that she is receiving 2 dietary supplements and appears to be eating all of her food    11/22  Patient has no new symptoms  Glycemia remains very poorly controlled though it has improved slightly over yesterday  Dietary service has evaluated the patient's diet and recommendations noted  We will advance insulin therapy    Past Medical History:          Diagnosis Date    [facility]EF[facility]     1/16/2018- echo- LVEF 55-65%    Anal fissure     Anemia 10/6/2017    Anxiety and depression     Arthritis     Asthma     Atrial fibrillation (HCC)     Eliquis    Blood transfusion     long time no reaction    CHF (congestive heart failure) (HCC)     Diastolic    COPD (chronic obstructive pulmonary disease) (HCC)     Disc disorder     DM2 (diabetes mellitus, type 2) ([facility]     on insulin    Fall due to stumbling 10/6/2017    GERD (gastroesophageal reflux disease)     Hx of blood clots     Hyperlipidemia     Hypertension     Inappropriate sinus tachycardia     LVH (left ventricular hypertrophy)     moderate    Lymphadenitis, chronic 4/13/2018    Occipital neuralgia 11/2/2011    Respiratory acidosis 10/7/2017    Sinus tachycardia 2/16/2015       Past Surgical History:          Procedure Laterality Date    ANOSCOPY  10/25/2006    injection of anal sphincter with Botox, Franklyn Ortiz/Timmy, [facility]    ANOSCOPY  4/9/2007    injection of anal sphincter with Botox, Dr. Ap Gamez, [address] [place] [address] ANOSCOPY  6/13/2007    injection of anal sphincter with Botox, Franklyn Balderas [facility]    ANUS SURGERY  4/4/2006    [facility]  4/[facility]/2012    anal exam and injection of Botox 100 units into anal sphincter, Laila Balderas, [facility]    APPENDECTOMY  1965   [address]    COLONOSCOPY  1/20/2004    cecal polyp, bx (no pathologic changes), Dr. Meka Tabares, [address] [place] [address] COLONOSCOPY  8/11/2006    snare polypectomy terminal ileum polyp (granulation tissue polyp) and anal polyp (inflammatory/papilla), Dr. Ap Gamez, [address] [place] [address] COLONOSCOPY  3/23/2012    bx/cauterization proximal ascending colon polyp, injection of anal sphincter with Botox, Dr. Noris Blake, [address] [facility] JOINT REPLACEMENT  2003 [address] [facility], SKIN, SUB-Q TISSUE,MUSCLE,=<20 SQ CM Left 11/30/2018    LEFT LEG EXCISIONAL  DEBRIDEMENT WITH TISSUE BIOPSY performed by Vern Ruffin DPM at [address] ENDOSCOPY  1/20/2004    gastritis, bx (no H pylori), Dr. Macario Alex, [address] ENDOSCOPY N/A 6/1/2018    EGD BIOPSY performed by Michael Shell MD at [place] [address] N/A 11/9/2018    EGD BIOPSY performed by Michael Shell MD at [facility] ENDOSCOPY       Allergies:  Statins    Social History:      TOBACCO:   reports that she quit smoking about 15 years ago. Her smoking use included cigarettes. She started smoking about 41 years ago. She has a 37.50 pack-year smoking history. She has never used smokeless tobacco.  ETOH:   reports no history of alcohol use. REVIEW OF SYSTEMS:     Pertinent positives as noted in the HPI. All other systems reviewed and negative. PHYSICAL EXAM:    BP (!) 104/55   Pulse 80   Temp 97.6 °F (36.4 °C) (Temporal)   Resp 16   Ht 5' 3\" (1.6 m)   Wt 282 lb (127.9 kg)   LMP  (LMP Unknown)   SpO2 99%   BMI 49.95 kg/m²     General appearance:  Lying comfortably in bed. No apparent distress. Morbidly obese  HEENT:  Normal cephalic, atraumatic without obvious deformity. Pupils equal, round, and reactive to light. Extra ocular muscles intact. Conjunctivae/corneas clear. Neck: Supple, with full range of motion. No jugular venous distention. Trachea midline. Respiratory:  Clear to auscultation bilaterally. Cardiovascular: Normal S1/S2. Regular rhythm and rate. Abdomen: Soft, non-tender, non-distended with normal bowel sounds.   Extremities: Lymphedema on bilateral lower extremities with chronic venous ulcers and severe skin changes bilaterally  Skin: Skin color, texture, turgor normal.  No rashes or lesions. Neurologic:  No focal deficit. Psychiatric:  Alert and oriented, thought content appropriate, normal insight  Peripheral Pulses: +2 palpable, equal bilaterally       CXR:   No results found. Labs:     No results for input(s): WBC, HGB, HCT, PLT in the last 72 hours. No results for input(s): NA, K, CL, CO2, BUN, CREATININE, CALCIUM, PHOS in the last 72 hours. Invalid input(s): MAGNES  No results for input(s): AST, ALT, BILIDIR, BILITOT, ALKPHOS in the last 72 hours. No results for input(s): INR in the last 72 hours. No results for input(s): Connee [place] in the last 72 hours. Urinalysis:      Lab Results   Component Value Date    NITRU Negative 11/17/2020    WBCUA NONE 11/17/2020    BACTERIA NONE SEEN 11/17/2020    RBCUA 0-1 11/17/2020    BLOODU TRACE-INTACT 11/17/2020    SPECGRAV 1.010 11/17/2020    GLUCOSEU >=1000 11/17/2020             Active Hospital Problems    Diagnosis Date Noted    Hyperglycemia [R73.9] 11/17/2020    Chronic respiratory failure with hypoxia [facility]) [J96.11] 35/34/6253    Diastolic CHF, acute on chronic (HCC) [I50.33]     [facility]controlled [facility] ([facility] [E11.65] 11/12/2010     ASSESSMENT:  1. Uncontrolled type II diabetes mellitus -she remains hyperglycemic and we will start Lantus twice daily with adjusted dose  2. Chronic venous ulcers on bilateral lower extremity associated with lymphedema  3. Stage I sacral wound (POA) - causing significant pain wound care has evaluated  4. Chronic respiratory failure with hypoxia clinically stable  5. Chronic diastolic CHF clinically stable  6. Asthma/COPD  7. Atrial fibrillation  8. Chronic anticoagulation  9. Hypertension  10. Morbid obesity - Body mass index is 49.95 kg/m².    9.  Physical deconditioning    PLAN:  Adjusted basal insulin further to twice daily now increased to 38 units and her blood sugars remain poorly controlled-we will increase further  Glycemia remains poorly controlled  He is also receiving 2 nutritional supplements and appears to be eating all of her meals  Dietary evaluation has been completed and noted    Wound care consult-has been completed and reviewed  She requires 4 L nasal cannula  Social service to start discharge planning soon as possible  Maintain Bumex diuretic therapy and anticoagulation  Monitor electrolytes and blood count    PRN analgesia  Resume home medications  PT/OT eval      DVT Prophylaxis: On Eliquis  Diet: DIET CARB CONTROL; Dietary Nutrition Supplements: Wound Healing Oral Supplement  Code Status: Full Code      Dispo - home with [facility] once stable       Gil Wang MD 11/22/2020 8:46 AM    Thank you Lane Sandoval DO for the opportunity to be involved in this patient's care. If you have any questions or concerns please feel free to contact me at [phone].

## 2020-11-22 NOTE — PROGRESS NOTES
Patient requesting a turkey sandwhich. Educated patient on her BS being 415 and that we need to keep to a carb control diet and that her BS have only been trending up. She has been snacking all day and all night. She started getting anxious and tearful, stating 'everyone else gives me one I have one everynight. They don't have my insulin right. I take 110 of lantus at night and I take 90 units of humalog before I eat'. Med rec states otherwise and per nurses note it was verified with her pharmacy. I told patient that I did not feel comfortable giving her a turkey sandwich with her BS. Rechecked a few minutes ago and her BS was 388. She wants to talk to the nursing supervisor and the doctor regarding her insulin coverage. Left this message for Dr. Michael Monroe who is on call:    Patient is requesting a turkey sandwhich and I do not feel comfortable giving her this because her blood sugar was 415 earlier so I told her we need to stop snacking. Patient stated that she is not taking the correct dose of lantus and Humalog. she states she takes 110 of lantus at night and 90 units of Humalog before meals. Her med rec states otherwise, it was verified with her pharmacy also per note. I just retook her BS and is was 388. She wants to talk to a doctor about her sliding scale and coverage and her BS not being controlled. Called nursing supervisor Mignon who stated that she will try to come up and talk to the patient when she can.

## 2020-11-23 LAB
METER GLUCOSE: 256 MG/DL (ref 74–99)
METER GLUCOSE: 332 MG/DL (ref 74–99)
METER GLUCOSE: 424 MG/DL (ref 74–99)
METER GLUCOSE: 460 MG/DL (ref 74–99)

## 2020-11-23 PROCEDURE — 2700000000 HC OXYGEN THERAPY PER DAY

## 2020-11-23 PROCEDURE — 6370000000 HC RX 637 (ALT 250 FOR IP): Performed by: FAMILY MEDICINE

## 2020-11-23 PROCEDURE — G0378 HOSPITAL OBSERVATION PER HR: HCPCS

## 2020-11-23 PROCEDURE — 82962 GLUCOSE BLOOD TEST: CPT

## 2020-11-23 PROCEDURE — 2580000003 HC RX 258: Performed by: HOSPITALIST

## 2020-11-23 PROCEDURE — 6370000000 HC RX 637 (ALT 250 FOR IP): Performed by: INTERNAL MEDICINE

## 2020-11-23 PROCEDURE — 6370000000 HC RX 637 (ALT 250 FOR IP): Performed by: HOSPITALIST

## 2020-11-23 RX ORDER — INSULIN GLARGINE 100 [IU]/ML
48 INJECTION, SOLUTION SUBCUTANEOUS 2 TIMES DAILY
Status: DISCONTINUED | OUTPATIENT
Start: 2020-11-23 | End: 2020-11-24

## 2020-11-23 RX ADMIN — ACETAMINOPHEN 650 MG: 325 TABLET ORAL at 14:16

## 2020-11-23 RX ADMIN — PANTOPRAZOLE SODIUM 40 MG: 40 TABLET, DELAYED RELEASE ORAL at 06:23

## 2020-11-23 RX ADMIN — ESCITALOPRAM 20 MG: 10 TABLET, FILM COATED ORAL at 08:19

## 2020-11-23 RX ADMIN — OMEGA-3-ACID ETHYL ESTERS 2 G: 1 CAPSULE, LIQUID FILLED ORAL at 08:20

## 2020-11-23 RX ADMIN — Medication: at 21:08

## 2020-11-23 RX ADMIN — TRAMADOL HYDROCHLORIDE 100 MG: 50 TABLET ORAL at 05:20

## 2020-11-23 RX ADMIN — BUMETANIDE 2 MG: 1 TABLET ORAL at 08:20

## 2020-11-23 RX ADMIN — GABAPENTIN 300 MG: 300 CAPSULE ORAL at 20:58

## 2020-11-23 RX ADMIN — INSULIN GLARGINE 48 UNITS: 100 INJECTION, SOLUTION SUBCUTANEOUS at 21:00

## 2020-11-23 RX ADMIN — INSULIN LISPRO 15 UNITS: 100 INJECTION, SOLUTION INTRAVENOUS; SUBCUTANEOUS at 12:14

## 2020-11-23 RX ADMIN — INSULIN LISPRO 5 UNITS: 100 INJECTION, SOLUTION INTRAVENOUS; SUBCUTANEOUS at 21:00

## 2020-11-23 RX ADMIN — MONTELUKAST 10 MG: 10 TABLET, FILM COATED ORAL at 20:57

## 2020-11-23 RX ADMIN — GABAPENTIN 300 MG: 300 CAPSULE ORAL at 08:19

## 2020-11-23 RX ADMIN — INSULIN LISPRO 12 UNITS: 100 INJECTION, SOLUTION INTRAVENOUS; SUBCUTANEOUS at 16:19

## 2020-11-23 RX ADMIN — Medication 10 ML: at 20:58

## 2020-11-23 RX ADMIN — BUMETANIDE 2 MG: 1 TABLET ORAL at 20:58

## 2020-11-23 RX ADMIN — INSULIN LISPRO 15 UNITS: 100 INJECTION, SOLUTION INTRAVENOUS; SUBCUTANEOUS at 08:17

## 2020-11-23 RX ADMIN — INSULIN LISPRO 18 UNITS: 100 INJECTION, SOLUTION INTRAVENOUS; SUBCUTANEOUS at 08:16

## 2020-11-23 RX ADMIN — GABAPENTIN 300 MG: 300 CAPSULE ORAL at 14:14

## 2020-11-23 RX ADMIN — ACETAMINOPHEN 650 MG: 325 TABLET ORAL at 05:20

## 2020-11-23 RX ADMIN — TRAMADOL HYDROCHLORIDE 100 MG: 50 TABLET ORAL at 20:57

## 2020-11-23 RX ADMIN — INSULIN GLARGINE 45 UNITS: 100 INJECTION, SOLUTION SUBCUTANEOUS at 08:18

## 2020-11-23 RX ADMIN — INSULIN LISPRO 15 UNITS: 100 INJECTION, SOLUTION INTRAVENOUS; SUBCUTANEOUS at 16:20

## 2020-11-23 RX ADMIN — APIXABAN 5 MG: 5 TABLET, FILM COATED ORAL at 08:20

## 2020-11-23 RX ADMIN — Medication 10 ML: at 12:05

## 2020-11-23 RX ADMIN — DILTIAZEM HYDROCHLORIDE 240 MG: 240 CAPSULE, EXTENDED RELEASE ORAL at 08:21

## 2020-11-23 RX ADMIN — APIXABAN 5 MG: 5 TABLET, FILM COATED ORAL at 20:58

## 2020-11-23 RX ADMIN — MICONAZOLE NITRATE: 20.6 POWDER TOPICAL at 08:21

## 2020-11-23 RX ADMIN — Medication: at 08:22

## 2020-11-23 RX ADMIN — PETROLATUM: 42 OINTMENT TOPICAL at 12:05

## 2020-11-23 RX ADMIN — FERROUS SULFATE TAB 325 MG (65 MG ELEMENTAL FE) 325 MG: 325 (65 FE) TAB at 08:19

## 2020-11-23 RX ADMIN — INSULIN LISPRO 18 UNITS: 100 INJECTION, SOLUTION INTRAVENOUS; SUBCUTANEOUS at 12:13

## 2020-11-23 RX ADMIN — MICONAZOLE NITRATE: 20.6 POWDER TOPICAL at 21:08

## 2020-11-23 RX ADMIN — OMEGA-3-ACID ETHYL ESTERS 2 G: 1 CAPSULE, LIQUID FILLED ORAL at 20:57

## 2020-11-23 RX ADMIN — SPIRONOLACTONE 25 MG: 25 TABLET ORAL at 08:19

## 2020-11-23 RX ADMIN — METOPROLOL SUCCINATE 100 MG: 50 TABLET, EXTENDED RELEASE ORAL at 08:19

## 2020-11-23 ASSESSMENT — PAIN DESCRIPTION - LOCATION
LOCATION: FOOT
LOCATION: FOOT

## 2020-11-23 ASSESSMENT — PAIN DESCRIPTION - DESCRIPTORS
DESCRIPTORS: ACHING;DISCOMFORT;SHARP
DESCRIPTORS: ACHING;SHARP;SHOOTING

## 2020-11-23 ASSESSMENT — PAIN DESCRIPTION - PROGRESSION: CLINICAL_PROGRESSION: NOT CHANGED

## 2020-11-23 ASSESSMENT — PAIN SCALES - GENERAL
PAINLEVEL_OUTOF10: 7
PAINLEVEL_OUTOF10: 4
PAINLEVEL_OUTOF10: 10
PAINLEVEL_OUTOF10: 0

## 2020-11-23 ASSESSMENT — PAIN DESCRIPTION - ORIENTATION
ORIENTATION: RIGHT
ORIENTATION: RIGHT;LOWER

## 2020-11-23 ASSESSMENT — PAIN - FUNCTIONAL ASSESSMENT: PAIN_FUNCTIONAL_ASSESSMENT: PREVENTS OR INTERFERES SOME ACTIVE ACTIVITIES AND ADLS

## 2020-11-23 ASSESSMENT — PAIN DESCRIPTION - PAIN TYPE
TYPE: ACUTE PAIN
TYPE: ACUTE PAIN

## 2020-11-23 ASSESSMENT — PAIN DESCRIPTION - ONSET: ONSET: ON-GOING

## 2020-11-23 ASSESSMENT — PAIN DESCRIPTION - FREQUENCY: FREQUENCY: CONTINUOUS

## 2020-11-23 NOTE — PROGRESS NOTES
When this nurse walked in to take patient's vital signs, patient had eyes closed. Asked patient if I can take her blood pressure, patient appeared drowsy, extremely diaphoretic and complain of hunger asking for a snack. BS taken, 316. Educated patient on carb diet especially with patient having s/s of hyperglycemia. Patient stated \"I'm not drowsy\" though then this nurse was taking vitals patient was in and out and clearly drowsy. \"You're the only one who won't let me have a snack. You're the only one. My blood sugar is better, it was 400 something earlier and now its in the 300s\" Gave patient education again (and multiple times since she had been here since admission) that her blood sugar is still high and not controled. Explained to patient that I am trying to help her and explained the risk of keeping her blood sugar high. No evidence of learning, patient centers her whole life around food and does not listen to anything this nurse has said.

## 2020-11-23 NOTE — PROGRESS NOTES
Patient refusing waffle cushion despite complaining on pain in coccyx. Gave patient pain medication and encouraged q2 turns.

## 2020-11-23 NOTE — PROGRESS NOTES
Progress note hospitalist service      Patient admitted overnight by hospital service  Details of the admission as outlined below  Chart reviewed patient interviewed and examined    Chief Complaint: Burning pain both legs    History Of Present Illness:     75 y.o. female with PMH of asthma/COPD with chronic respiratory failure requiring 4 L oxygen chronically; hypertension, atrial fibrillation, diabetes mellitus, bilateral lower extremity lymphedema with chronic wound who presented with burning pain on bilateral legs for the past few days. She also reports worsening fatigue, generalized weakness and not been having difficulty making it to the bathroom resulting in accidents. Patient states she had not used insulin for several days because she does not know how to use the insulin pen on which she was just started. She denies any chest pain, shortness of breath, palpitation; no fever/chill, abdominal pain, diarrhea, or dysuria. Patient is also complaining of severe \"pain in my tailbone\"; knows she has done ulcer for some time.     On arrival to the emergency department, she was found to have profound hyperglycemia with glucose level at 535 but no evidence of DKA    11/19  Patient feels fine  Patient has been evaluated by wound care service orders are reviewed  She is still running 300+ consistently  No change in oxygen demand and vitals are stable    11/20  Has been a resurgence in her glycemic level  Insulin dosing has been reviewed and revised  She feels fine  Has not yet been since ambulated according to her report however we will have physical therapy again see her    11/21  Her glycemia is still not well controlled  We will have dietary reviewed the diet for total caloric intake noting that she is receiving 2 dietary supplements and appears to be eating all of her food    11/22  Patient has no new symptoms  Glycemia remains very poorly controlled though it has improved slightly over yesterday  Dietary service has evaluated the patient's diet and recommendations noted  We will advance insulin therapy    11/23  Sugars are running high still    Past Medical History:          Diagnosis Date    (HFpEF) heart failure with preserved ejection fraction ([facility]     1/16/2018- echo- LVEF 55-65%    Anal fissure     Anemia 10/6/2017    Anxiety and depression     Arthritis     Asthma     Atrial fibrillation (HCC)     Eliquis    Blood transfusion     long time no reaction    CHF (congestive heart failure) (HCC)     Diastolic    COPD (chronic obstructive pulmonary disease) ([facility]     Disc disorder     DM2 (diabetes mellitus, type 2) ([facility]     on insulin    Fall due to stumbling 10/6/2017    GERD (gastroesophageal reflux disease)     Hx of blood clots     Hyperlipidemia     Hypertension     Inappropriate sinus tachycardia     LVH (left ventricular hypertrophy)     moderate    Lymphadenitis, chronic 4/13/2018    Occipital neuralgia 11/2/2011    Respiratory acidosis 10/7/2017    Sinus tachycardia 2/16/2015       Past Surgical History:          Procedure Laterality Date    ANOSCOPY  10/25/2006    injection of anal sphincter with Botox, Franlkyn Ortiz/Timmy, [facility]    ANOSCOPY  4/9/2007    injection of anal sphincter with Botox, Dr. Erika Bullard, [address] [place] [address] ANOSCOPY  6/13/2007    injection of anal sphincter with Botox, Franklyn Taylor [facility]    ANUS SURGERY  4/4/2006    [facility]  4/[facility]/2012    anal exam and injection of Botox 100 units into anal sphincter, Laila Taylor, [facility]    APPENDECTOMY  1965   [address]    COLONOSCOPY  1/20/2004    cecal polyp, bx (no pathologic changes), Dr. Evita Melissa, [address] [place] [address] COLONOSCOPY  8/11/2006    snare polypectomy terminal ileum polyp (granulation tissue polyp) and anal polyp (inflammatory/papilla), Dr. Erika Bullard, [address] [place] [address] COLONOSCOPY  3/23/2012 bx/cauterization proximal ascending colon polyp, injection of anal sphincter with Botox, Dr. Aniyah Dexter, [address] [facility] JOINT REPLACEMENT  [address] [facility] [place], SKIN, SUB-Q TISSUE,MUSCLE,=<20 SQ CM Left 11/30/2018    LEFT LEG EXCISIONAL  DEBRIDEMENT WITH TISSUE BIOPSY performed by Jarrod Tidwell DPM at [address] ENDOSCOPY  1/20/2004    gastritis, bx (no H pylori), Dr. sIi Guadalupe, [address] ENDOSCOPY N/A 6/1/2018    EGD BIOPSY performed by Treva Alves MD at [address] N/A 11/9/2018    EGD BIOPSY performed by Treva Alves MD at [facility] ENDOSCOPY       Allergies:  Statins    Social History:      TOBACCO:   reports that she quit smoking about 15 years ago. Her smoking use included cigarettes. She started smoking about 41 years ago. She has a 37.50 pack-year smoking history. She has never used smokeless tobacco.  ETOH:   reports no history of alcohol use. REVIEW OF SYSTEMS:     Pertinent positives as noted in the HPI. All other systems reviewed and negative. PHYSICAL EXAM:    /60   Pulse 75   Temp 96.6 °F (35.9 °C) (Temporal)   Resp 18   Ht 5' 3\" (1.6 m)   Wt 282 lb (127.9 kg)   LMP  (LMP Unknown)   SpO2 97%   BMI 49.95 kg/m²     General appearance:  Lying comfortably in bed. No apparent distress. Morbidly obese  HEENT:  Normal cephalic, atraumatic without obvious deformity. Pupils equal, round, and reactive to light. Extra ocular muscles intact. Conjunctivae/corneas clear. Neck: Supple, with full range of motion. No jugular venous distention. Trachea midline. Respiratory:  Clear to auscultation bilaterally. Cardiovascular: Normal S1/S2. Regular rhythm and rate. Abdomen: Soft, non-tender, non-distended with normal bowel sounds.   Extremities: Lymphedema on bilateral lower extremities with chronic venous ulcers and severe skin changes bilaterally  Skin: Skin color, texture, turgor normal.  No rashes or lesions. Neurologic:  No focal deficit. Psychiatric:  Alert and oriented, thought content appropriate, normal insight  Peripheral Pulses: +2 palpable, equal bilaterally       CXR:   No results found. Labs:     No results for input(s): WBC, HGB, HCT, PLT in the last 72 hours. No results for input(s): NA, K, CL, CO2, BUN, CREATININE, CALCIUM, PHOS in the last 72 hours. Invalid input(s): MAGNES  No results for input(s): AST, ALT, BILIDIR, BILITOT, ALKPHOS in the last 72 hours. No results for input(s): INR in the last 72 hours. No results for input(s): [place] in the last 72 hours. Urinalysis:      Lab Results   Component Value Date    NITRU Negative 11/17/2020    WBCUA NONE 11/17/2020    BACTERIA NONE SEEN 11/17/2020    RBCUA 0-1 11/17/2020    BLOODU TRACE-INTACT 11/17/2020    SPECGRAV 1.010 11/17/2020    GLUCOSEU >=1000 11/17/2020             Active Hospital Problems    Diagnosis Date Noted    Hyperglycemia [R73.9] 11/17/2020    Chronic respiratory failure with hypoxia [facility]) [J96.11] 94/79/8796    Diastolic CHF, acute on chronic (HCC) [I50.33]     Uncontrolled diabetes mellitus ([facility] [E11.65] 11/12/2010     ASSESSMENT:  1. Uncontrolled type II diabetes mellitus -she remains hyperglycemic and we will start Lantus twice daily with adjusted dose  2. Chronic venous ulcers on bilateral lower extremity associated with lymphedema  3. Stage I sacral wound (POA) - causing significant pain wound care has evaluated  4. Chronic respiratory failure with hypoxia clinically stable  5. Chronic diastolic CHF clinically stable  6. Asthma/COPD  7. Atrial fibrillation  8. Chronic anticoagulation  9. Hypertension  10. Morbid obesity - Body mass index is 49.95 kg/m².    9.  Physical deconditioning    PLAN:  Adjusted basal insulin further to twice daily now increased to 38 units and her blood sugars remain poorly controlled-we will increase further  Glycemia remains poorly controlled  He is also receiving 2 nutritional supplements and appears to be eating all of her meals  Dietary evaluation has been completed and noted  She will likely benefit from endo input/evaluation  Will consult      PRN analgesia  Resume home medications  PT/OT eval      DVT Prophylaxis: On Eliquis  Diet: DIET CARB CONTROL; Dietary Nutrition Supplements: Wound Healing Oral Supplement  Code Status: Full Code      Dispo - home with [facility] once stable       Anam Chamorro MD 11/23/2020 4:43 PM    Thank you Garland De La Cruz DO for the opportunity to be involved in this patient's care.

## 2020-11-23 NOTE — PROGRESS NOTES
This message sent to Dr. Shahid Ramirez:    \"Patient's BS is 424 now. Our breakfast doesn't come until around 8am. She gets 15 units of Humalog straight and then her sliding scale is 18 units for a BS over 400+. She is more drowsy than she was earlier in the shift and she was pretty diaphoretic. I didn't know if you wanted me to cover this BS now with something? thank you\"      Patient is arousable but more drowsy than she was earlier this shift.

## 2020-11-24 ENCOUNTER — APPOINTMENT (OUTPATIENT)
Dept: GENERAL RADIOLOGY | Age: 65
DRG: 420 | End: 2020-11-24
Payer: COMMERCIAL

## 2020-11-24 LAB
ANION GAP SERPL CALCULATED.3IONS-SCNC: 10 MMOL/L (ref 7–16)
BUN BLDV-MCNC: 27 MG/DL (ref 8–23)
CALCIUM SERPL-MCNC: 9.4 MG/DL (ref 8.6–10.2)
CHLORIDE BLD-SCNC: 86 MMOL/L (ref 98–107)
CO2: 40 MMOL/L (ref 22–29)
CREAT SERPL-MCNC: 0.8 MG/DL (ref 0.5–1)
GFR AFRICAN AMERICAN: >60
GFR NON-AFRICAN AMERICAN: >60 ML/MIN/1.73
GLUCOSE BLD-MCNC: 354 MG/DL (ref 74–99)
HCT VFR BLD CALC: 33.5 % (ref 34–48)
HEMOGLOBIN: 9.9 G/DL (ref 11.5–15.5)
MCH RBC QN AUTO: 24.4 PG (ref 26–35)
MCHC RBC AUTO-ENTMCNC: 29.6 % (ref 32–34.5)
MCV RBC AUTO: 82.7 FL (ref 80–99.9)
METER GLUCOSE: 195 MG/DL (ref 74–99)
METER GLUCOSE: 280 MG/DL (ref 74–99)
METER GLUCOSE: 284 MG/DL (ref 74–99)
METER GLUCOSE: 288 MG/DL (ref 74–99)
PDW BLD-RTO: 14.1 FL (ref 11.5–15)
PLATELET # BLD: 239 E9/L (ref 130–450)
PMV BLD AUTO: 9.5 FL (ref 7–12)
POTASSIUM SERPL-SCNC: 4 MMOL/L (ref 3.5–5)
RBC # BLD: 4.05 E12/L (ref 3.5–5.5)
SODIUM BLD-SCNC: 136 MMOL/L (ref 132–146)
WBC # BLD: 5.8 E9/L (ref 4.5–11.5)

## 2020-11-24 PROCEDURE — 99254 IP/OBS CNSLTJ NEW/EST MOD 60: CPT | Performed by: INTERNAL MEDICINE

## 2020-11-24 PROCEDURE — 6370000000 HC RX 637 (ALT 250 FOR IP): Performed by: HOSPITALIST

## 2020-11-24 PROCEDURE — 36415 COLL VENOUS BLD VENIPUNCTURE: CPT

## 2020-11-24 PROCEDURE — 97530 THERAPEUTIC ACTIVITIES: CPT

## 2020-11-24 PROCEDURE — 71045 X-RAY EXAM CHEST 1 VIEW: CPT

## 2020-11-24 PROCEDURE — 2580000003 HC RX 258: Performed by: HOSPITALIST

## 2020-11-24 PROCEDURE — 97535 SELF CARE MNGMENT TRAINING: CPT

## 2020-11-24 PROCEDURE — 85027 COMPLETE CBC AUTOMATED: CPT

## 2020-11-24 PROCEDURE — 6370000000 HC RX 637 (ALT 250 FOR IP): Performed by: INTERNAL MEDICINE

## 2020-11-24 PROCEDURE — 1200000000 HC SEMI PRIVATE

## 2020-11-24 PROCEDURE — 80048 BASIC METABOLIC PNL TOTAL CA: CPT

## 2020-11-24 PROCEDURE — 82962 GLUCOSE BLOOD TEST: CPT

## 2020-11-24 PROCEDURE — 2700000000 HC OXYGEN THERAPY PER DAY

## 2020-11-24 PROCEDURE — 6370000000 HC RX 637 (ALT 250 FOR IP): Performed by: FAMILY MEDICINE

## 2020-11-24 RX ORDER — INSULIN GLARGINE 100 [IU]/ML
50 INJECTION, SOLUTION SUBCUTANEOUS 2 TIMES DAILY
Status: DISCONTINUED | OUTPATIENT
Start: 2020-11-24 | End: 2020-11-26 | Stop reason: HOSPADM

## 2020-11-24 RX ADMIN — ACETAMINOPHEN 650 MG: 325 TABLET ORAL at 16:12

## 2020-11-24 RX ADMIN — INSULIN LISPRO 9 UNITS: 100 INJECTION, SOLUTION INTRAVENOUS; SUBCUTANEOUS at 16:17

## 2020-11-24 RX ADMIN — INSULIN LISPRO 28 UNITS: 100 INJECTION, SOLUTION INTRAVENOUS; SUBCUTANEOUS at 09:00

## 2020-11-24 RX ADMIN — SPIRONOLACTONE 25 MG: 25 TABLET ORAL at 08:55

## 2020-11-24 RX ADMIN — Medication: at 08:58

## 2020-11-24 RX ADMIN — PETROLATUM: 42 OINTMENT TOPICAL at 08:58

## 2020-11-24 RX ADMIN — INSULIN GLARGINE 50 UNITS: 100 INJECTION, SOLUTION SUBCUTANEOUS at 08:59

## 2020-11-24 RX ADMIN — INSULIN LISPRO 28 UNITS: 100 INJECTION, SOLUTION INTRAVENOUS; SUBCUTANEOUS at 11:55

## 2020-11-24 RX ADMIN — ACETAMINOPHEN 650 MG: 325 TABLET ORAL at 22:31

## 2020-11-24 RX ADMIN — Medication: at 21:48

## 2020-11-24 RX ADMIN — PANTOPRAZOLE SODIUM 40 MG: 40 TABLET, DELAYED RELEASE ORAL at 06:29

## 2020-11-24 RX ADMIN — INSULIN LISPRO 2 UNITS: 100 INJECTION, SOLUTION INTRAVENOUS; SUBCUTANEOUS at 20:32

## 2020-11-24 RX ADMIN — APIXABAN 5 MG: 5 TABLET, FILM COATED ORAL at 08:55

## 2020-11-24 RX ADMIN — GABAPENTIN 300 MG: 300 CAPSULE ORAL at 13:33

## 2020-11-24 RX ADMIN — ESCITALOPRAM 20 MG: 10 TABLET, FILM COATED ORAL at 08:55

## 2020-11-24 RX ADMIN — METOPROLOL SUCCINATE 100 MG: 50 TABLET, EXTENDED RELEASE ORAL at 08:55

## 2020-11-24 RX ADMIN — Medication 10 ML: at 09:06

## 2020-11-24 RX ADMIN — INSULIN LISPRO 9 UNITS: 100 INJECTION, SOLUTION INTRAVENOUS; SUBCUTANEOUS at 11:58

## 2020-11-24 RX ADMIN — INSULIN LISPRO 35 UNITS: 100 INJECTION, SOLUTION INTRAVENOUS; SUBCUTANEOUS at 16:14

## 2020-11-24 RX ADMIN — INSULIN GLARGINE 50 UNITS: 100 INJECTION, SOLUTION SUBCUTANEOUS at 20:30

## 2020-11-24 RX ADMIN — Medication 10 ML: at 21:48

## 2020-11-24 RX ADMIN — MONTELUKAST 10 MG: 10 TABLET, FILM COATED ORAL at 20:30

## 2020-11-24 RX ADMIN — APIXABAN 5 MG: 5 TABLET, FILM COATED ORAL at 20:30

## 2020-11-24 RX ADMIN — BUMETANIDE 2 MG: 1 TABLET ORAL at 08:55

## 2020-11-24 RX ADMIN — FERROUS SULFATE TAB 325 MG (65 MG ELEMENTAL FE) 325 MG: 325 (65 FE) TAB at 08:55

## 2020-11-24 RX ADMIN — OMEGA-3-ACID ETHYL ESTERS 2 G: 1 CAPSULE, LIQUID FILLED ORAL at 20:30

## 2020-11-24 RX ADMIN — OMEGA-3-ACID ETHYL ESTERS 2 G: 1 CAPSULE, LIQUID FILLED ORAL at 08:56

## 2020-11-24 RX ADMIN — GABAPENTIN 300 MG: 300 CAPSULE ORAL at 20:32

## 2020-11-24 RX ADMIN — GABAPENTIN 300 MG: 300 CAPSULE ORAL at 08:55

## 2020-11-24 RX ADMIN — TRAZODONE HYDROCHLORIDE 50 MG: 50 TABLET ORAL at 22:31

## 2020-11-24 RX ADMIN — DILTIAZEM HYDROCHLORIDE 240 MG: 240 CAPSULE, EXTENDED RELEASE ORAL at 08:55

## 2020-11-24 RX ADMIN — MICONAZOLE NITRATE: 20.6 POWDER TOPICAL at 21:48

## 2020-11-24 RX ADMIN — MICONAZOLE NITRATE: 20.6 POWDER TOPICAL at 08:58

## 2020-11-24 RX ADMIN — INSULIN LISPRO 9 UNITS: 100 INJECTION, SOLUTION INTRAVENOUS; SUBCUTANEOUS at 09:00

## 2020-11-24 ASSESSMENT — PAIN SCALES - GENERAL
PAINLEVEL_OUTOF10: 0
PAINLEVEL_OUTOF10: 6
PAINLEVEL_OUTOF10: 0
PAINLEVEL_OUTOF10: 5
PAINLEVEL_OUTOF10: 2

## 2020-11-24 ASSESSMENT — PAIN DESCRIPTION - LOCATION: LOCATION: HEAD

## 2020-11-24 ASSESSMENT — PAIN DESCRIPTION - PAIN TYPE: TYPE: ACUTE PAIN

## 2020-11-24 ASSESSMENT — PAIN DESCRIPTION - DESCRIPTORS: DESCRIPTORS: HEADACHE

## 2020-11-24 NOTE — PROGRESS NOTES
heartbeats, palpitations, paroxysmal nocturnal dyspnea. Respiratory:  Shortness of breath, coughing, sputum production, hemoptysis, wheezing, orthopnea. Gastrointestinal:  Nausea, vomiting, diarrhea, heartburn, constipation, abdominal pain, hematemesis, hematochezia, melena, acholic stools  Genito-Urinary:  Dysuria, urgency, frequency, hematuria  Musculoskeletal:  Joint pain, joint stiffness, joint swelling, muscle pain  Neurology:  Headache, focal neurological deficits, weakness, numbness, paresthesia  Derm:  Rashes, ulcers, excoriations, bruising  Extremities:  Decreased ROM, peripheral edema, mottling      OBJECTIVE:    BP (!) 162/78   Pulse 78   Temp 96.8 °F (36 °C) (Temporal)   Resp 14   Ht 5' 3\" (1.6 m)   Wt 282 lb (127.9 kg)   LMP  (LMP Unknown)   SpO2 99%   BMI 49.95 kg/m²     General appearance:  awake, alert, and oriented to person, place, time, and purpose; appears stated age and cooperative; no apparent distress no labored breathing 4L NC  HEENT:  Conjunctivae/corneas clear. Neck: Supple. No jugular venous distention. Respiratory: symmetrical; clear to auscultation bilaterally; no wheezes; no rhonchi; no rales  Cardiovascular: rhythm regular; rate controlled; no murmurs  Abdomen: Soft, nontender, nondistended  Extremities:  peripheral pulses present; no peripheral edema; +lymphedema  Musculoskeletal: No clubbing, cyanosis, no bilateral lower extremity edema. Brisk capillary refill. Skin:  No rashes  on visible skin  Neurologic: awake, alert and following commands     ASSESSMENT and PLAN:  · Uncontrolled type II diabetes mellitus -evaluated by endocrinology, adjusting insulin regimen  · Chronic venous ulcers on bilateral lower extremity associated with lymphedema  · Stage I sacral wound (POA) - causing significant pain wound care has evaluated  · Chronic respiratory failure with hypoxia clinically stable, CO2 increasing, has never been diagnosed with SAM but I do have a suspicion.  Will likely need outpatient sleep study  · Chronic diastolic CHF clinically stable  · Asthma/COPD  · Atrial fibrillation  · Chronic anticoagulation  · Hypertension  · Morbid obesity - Body mass index is 49.95 kg/m². · Physical deconditioning- PT/OT         DISPOSITION: Continue current plan of care. Pending PT OT evals for possible placement    Medications:  REVIEWED DAILY    Infusion Medications    dextrose       Scheduled Medications    insulin lispro  28 Units Subcutaneous TID WC    insulin glargine  50 Units Subcutaneous BID    insulin lispro  0-18 Units Subcutaneous TID WC    insulin lispro  0-9 Units Subcutaneous Nightly    mineral oil-hydrophilic petrolatum   Topical Daily    miconazole   Topical BID    miconazole nitrate   Topical BID    apixaban  5 mg Oral BID    bumetanide  2 mg Oral BID    escitalopram  20 mg Oral Daily    ferrous sulfate  325 mg Oral Daily with breakfast    gabapentin  300 mg Oral TID    spironolactone  25 mg Oral Daily    omega-3 acid ethyl esters  2 g Oral BID    montelukast  10 mg Oral Nightly    metoprolol succinate  100 mg Oral Daily    pantoprazole  40 mg Oral QAM AC    sodium chloride flush  10 mL Intravenous 2 times per day    dilTIAZem  240 mg Oral Daily     PRN Meds: miconazole nitrate **AND** miconazole nitrate, albuterol, traZODone, ipratropium-albuterol, sodium chloride flush, acetaminophen **OR** acetaminophen, polyethylene glycol, promethazine **OR** ondansetron, glucose, dextrose, glucagon (rDNA), dextrose, bisacodyl, traMADol **OR** traMADol    Labs:     No results for input(s): WBC, HGB, HCT, PLT in the last 72 hours. No results for input(s): NA, K, CL, CO2, BUN, CREATININE, CALCIUM, PHOS in the last 72 hours. Invalid input(s): MAGNES    No results for input(s): PROT, ALB, ALKPHOS, ALT, AST, BILITOT, AMYLASE, LIPASE in the last 72 hours. No results for input(s): INR in the last 72 hours.     No results for input(s): Reanna Enamorado in the

## 2020-11-24 NOTE — CARE COORDINATION
Discharge plan is home when medically stable. Visiting Physician's is following. The pt will need transportation home, which can be arranged with [facility].  Brittany Woodruff Rn

## 2020-11-24 NOTE — PROGRESS NOTES
OT BEDSIDE TREATMENT NOTE      Date:2020  Patient Name: Carlos Alberto Carbone  MRN: [id number]  : 1955  [facility]     Evaluating OT: JUDY Rizo, OTR/L   License #[id number]     Referring physician: Karolina Alvarado MD      AM-PAC Daily Activity Raw Score:      Recommended Adaptive Equipment: ww     Diagnosis: Hyperglycemia       Surgery: none       Pertinent Medical History: Diabetes Type 2, Anemia, Arthritis, COPD, Asthma, D, hypertension, A Fib       Precautions:  Falls, Safety, 4 L O2, gerber        Home Living: Pt lives with her  and 2 grandchildren in a 2 story home with 15 PER. Pt. Has a jori going into her home. B & B on 2nd floor but patient is unable to go to the 2nd floor currently because the chair lift does not work.      Bathroom setup: Walk in shower. Has BSC. Pt. Stated that she sponge bathes after set-up     Equipment owned: [facility], rollator, reacher, w/c     Prior Level of Function: Assistance with ADLs, Assistance with IADLs; using rollator for in house mobility and w/c for community mobility.      Driving: No (Pt. Has family and friends for assistance)  Medication Management Self   Occupation: Pt. Did not state   Leisure:Likes to spend time with her grandchildren      Pain Level: Pt reports BLE pain, increased with movement  Cognition: A&O: 4/4; Follows 2-step step directions              Memory:  Good-              Sequencing:  Fair              Problem solving:  Fair               Judgement/safety:  Fair                      Functional Assessment:     Initial Eval Status  Date: 20 Treatment Status  Date: 20 STGs = LTGs  Time frame: 2-4 days    Feeding Independent  Ind Independent   Grooming Set-up to comb hair at EOB  Set up   To wash face and apply deodorant while seated  Mod I  when standing with ww   UB Dressing Set-up simulated SBA  To don/doff gown seated  Independent   LB Dressing Dependent   Pt.  Had severe edema in B/L LEs  Max A   To don/doff socks seated in bedside chair   Mod I with AE prn    Bathing Max A     Mod A  Seated in bedside chair and standing for gurmeet bathing. Assist required for bilateral feet and gurmeet area Mod I seated    Toileting Max PARKER has catheter      Pt. Stated that she is not able to complete hygiene Max A  hygiene Mod I with BSC   Bed Mobility  Supine to sit: Min A     Sit to supine: NT      Log Rolling: NT Supine>sit MIN A   Educated pt on technique to increase independence. Supine to sit: Independent   Sit to supine: Independent       Functional Transfers    Sit > Stand: Min A with ww    Stand > Sit: Min A with ww    SPT: Min A with ww Sit<>stand SBA  Cuing for hand placement and body mechanics  Mod I with ww for all functional transfers. Functional Mobility NT  MIN A  Less than home distance no AD Mod I with ww  for all functional distances. Balance Sitting:        Static:  SBA     Dynamic:SBA      Standing: Min A with ww  Sitting:   Static: Ind   Dynamic: SBA    Standing: MIN A with w/w  Sitting:        Static:  Independent    Dynamic:Independent      Standing: Min A with ww   Activity Tolerance Poor with light activity. Pt. Was limited by severe pain in B/L LEs.    99 % Sp O2 on 4 L O2  97 bpm    Fair- light ADL activity  Good with moderate activity    Visual/  Perceptual Glasses: Yes Readers      Vision: [place]          Safety Fair with min/mod verbal cues for sequencing with ww, hand placement and safety.   Fair Good with moderate activity          Comments: Upon arrival pt supine in bed agreeable to therapy session. Pt educated on techniques to increase independence and safety during ADL's, bed mobility, and functional transfers. At end of session pt left seated in bedside chair,  all lines and tubes intact, call light within reach. · Pt has made fair  progress towards set goals.    · Continue with current plan of care      Treatment Time In: 11:00           Treatment Time Out: 11:40               Treatment Charges: Mins Units   Ther Ex  54378     Manual Therapy 96007     Thera Activities 77684 10 1   ADL/Home Mgt 89671 30 2   Neuro Re-ed 84595     Group Therapy      Orthotic manage/training  18448     Non-Billable Time     Total Timed Treatment 40 [address] [facility] PARIKH/L [id number]

## 2020-11-24 NOTE — CONSULTS
ENDOCRINOLOGY INITIAL CONSULTATION NOTE VIA TELEMEDICINE SERVICE       Date of admission: 11/17/2020  Date of service: 11/24/2020  Admitting physician: Nuria Tapia MD   Primary Care Physician: Sen Floyd DO  Consultant physician: Kip Farmer MD     Reason for the consultation:  Uncontrolled DM type 2     History of Present Illness:  Services were provided through a video synchronous discussion     Vandana Cuevas is a 75 y.o. old female with PMH of DM type 2, lymphedema with chronic wounds, COPD,on Ox, HTN, Afib and other listed below admitted to [facility] on 11/17/2020 because of worsening fatigue, LE pain and found to have marked hyperglycemia, endocrine team was consulted for diabetes management. The patient denies CP, SOB, fever/chill, diarrhea, or dysuria.       Up on arrival to ER, , AG 10, Bicarb 30, Cr 0.6     Prior to admission  The patient was diagnosed with type 2 DM at the age 38. Prior to admission patient was on Lantus 110 units daily, Humalog 90 unit with each meals. The patient was also on U500 insulin at on point. Patient has few hypoglycemic episodes (as low as 40). Patient has not been eating consistent carbohydrate meals, self-blood glucose monitoring has been highly variable prior to admission. In addition, patient reported h/o neuropathy.    Lab Results   Component Value Date    LABA1C 15.7 11/17/2020     Inpatient diet:   Carb Restricted diet     Point of care glucose monitoring (Independently reviewed)   Recent Labs     11/22/20  1646 11/22/20  2139 11/22/20  2255 11/23/20  0624 11/23/20  1116 11/23/20  1612 11/23/20  2057 11/24/20  0631   GLUMET 275* 327* 316* 424* 460* 332* 256* 288*       Past medical history:   Past Medical History:   Diagnosis Date    (HFpEF) heart failure with preserved ejection fraction ([facility]     1/16/2018- echo- LVEF 55-65%    Anal fissure     Anemia 10/6/2017    Anxiety and depression     Arthritis     Asthma     Atrial fibrillation ([facility] Eliquis    Blood transfusion     long time no reaction    CHF (congestive heart failure) (HCC)     Diastolic    COPD (chronic obstructive pulmonary disease) (HCC)     Disc disorder     DM2 (diabetes mellitus, type 2) ([facility]     on insulin    Fall due to stumbling 10/6/2017    GERD (gastroesophageal reflux disease)     Hx of blood clots     Hyperlipidemia     Hypertension     Inappropriate sinus tachycardia     LVH (left ventricular hypertrophy)     moderate    Lymphadenitis, chronic 4/13/2018    Occipital neuralgia 11/2/2011    Respiratory acidosis 10/7/2017    Sinus tachycardia 2/16/2015       Past surgical history:  Past Surgical History:   Procedure Laterality Date    ANOSCOPY  10/25/2006    injection of anal sphincter with Botox, Franklyn Ortiz/Timmy, [facility]    ANOSCOPY  4/9/2007    injection of anal sphincter with Botox, Dr. Payton Fragoso, [address] [place] [address] ANOSCOPY  6/13/2007    injection of anal sphincter with Botox, Franklyn Rucker [facility]    ANUS SURGERY  4/4/2006    [facility]  4/18/2012    anal exam and injection of Botox 100 units into anal sphincter, Laila Rucker, [facility]    APPENDECTOMY  1965   [address]    COLONOSCOPY  1/20/2004    cecal polyp, bx (no pathologic changes), Dr. Elizabet Andrew, [address] [place] [address] COLONOSCOPY  8/11/2006    snare polypectomy terminal ileum polyp (granulation tissue polyp) and anal polyp (inflammatory/papilla), Dr. Payton Fragoso, [address] [place] [address] COLONOSCOPY  3/23/2012    bx/cauterization proximal ascending colon polyp, injection of anal sphincter with Botox, Dr. Payton Fragoso, [facility]  2003    [address], SKIN, SUB-Q TISSUE,MUSCLE,=<20 SQ CM Left 11/30/2018    LEFT LEG EXCISIONAL  DEBRIDEMENT WITH TISSUE BIOPSY performed by Rosa Maria Park DPM at [address] [place] [address] 1/20/2004    gastritis, bx (no H pylori), Dr. Romana Whiting, [address] ENDOSCOPY N/A 6/1/2018    EGD BIOPSY performed by Vinnie Darby MD at [address] [facility] N/A 11/9/2018    EGD BIOPSY performed by Vinnie Darby MD at [address] history:   Tobacco:   reports that she quit smoking about 15 years ago. Her smoking use included cigarettes. She started smoking about 41 years ago. She has a 37.50 pack-year smoking history. She has never used smokeless tobacco.  Alcohol:   reports no history of alcohol use. Drugs:   reports no history of drug use. Family history:    Family History   Problem Relation Age of Onset    Heart Disease Mother     Diabetes Father     Colon Cancer Father     Other Father         BLINDNESS    Colon Cancer Sister     Diabetes Paternal Aunt     Diabetes Paternal Uncle     Diabetes Paternal Aunt     Diabetes Paternal Uncle        Allergy and drug reactions:    Allergies   Allergen Reactions    Statins Other (See Comments)     Muscle pains       Scheduled Meds:   insulin lispro  28 Units Subcutaneous TID WC    insulin glargine  50 Units Subcutaneous BID    insulin lispro  0-18 Units Subcutaneous TID WC    insulin lispro  0-9 Units Subcutaneous Nightly    mineral oil-hydrophilic petrolatum   Topical Daily    miconazole   Topical BID    miconazole nitrate   Topical BID    apixaban  5 mg Oral BID    bumetanide  2 mg Oral BID    escitalopram  20 mg Oral Daily    ferrous sulfate  325 mg Oral Daily with breakfast    gabapentin  300 mg Oral TID    spironolactone  25 mg Oral Daily    omega-3 acid ethyl esters  2 g Oral BID    montelukast  10 mg Oral Nightly    metoprolol succinate  100 mg Oral Daily    pantoprazole  40 mg Oral QAM AC    sodium chloride flush  10 mL Intravenous 2 times per day    dilTIAZem  240 mg Oral Daily     PRN Meds:   miconazole nitrate, , TID PRN  albuterol, 2.5 mg, 4x Daily PRN  traZODone, 50 mg, Nightly PRN  ipratropium-albuterol, 1 vial, Q6H PRN  sodium chloride flush, 10 mL, PRN  acetaminophen, 650 mg, Q6H PRN    Or  acetaminophen, 650 mg, Q6H PRN  polyethylene glycol, 17 g, Daily PRN  promethazine, 12.5 mg, Q6H PRN    Or  ondansetron, 4 mg, Q6H PRN  glucose, 15 g, PRN  dextrose, 12.5 g, PRN  glucagon (rDNA), 1 mg, PRN  dextrose, 100 mL/hr, PRN  bisacodyl, 10 mg, Daily PRN  traMADol, 50 mg, Q6H PRN    Or  traMADol, 100 mg, Q6H PRN      Continuous Infusions:   dextrose         Review of Systems  All systems reviewed. All negative except for symptoms mentioned in HPI     OBJECTIVE    /63   Pulse 79   Temp 97 °F (36.1 °C) (Temporal)   Resp 14   Ht 5' 3\" (1.6 m)   Wt 282 lb (127.9 kg)   LMP  (LMP Unknown)   SpO2 99%   BMI 49.95 kg/m²     Intake/Output Summary (Last 24 hours) at 11/24/2020 7590  Last data filed at 11/24/2020 0756  Gross per 24 hour   Intake 1210 ml   Output 3600 ml   Net -2390 ml     Physical examination:  Due to this being a TeleHealth encounter, evaluation of the following organ systems is limited: Vitals/Constitutional/EENT/Resp/CV/GI//MS/Neuro/Skin/Heme-Lymph-Imm. Modified physical exam through Telemedicine camera    General: Communicating well via camera   Neck: no obvious neck mass. No obvious neck deformity     CVS: no distress   Chest: no distress. Chest is moving with respiration    Extremities:  no visible tremor  Skin: No visible rashes as seen from camera   Musculoskeletal: no visible deformity  Neuro: Alert and oriented to person, place, and time. Psychiatric: Normal mood and affect. Behavior is normal    Review of Laboratory Data:  I personally reviewed the following labs:   No results for input(s): WBC, RBC, HGB, HCT, MCV, MCH, MCHC, RDW, PLT, MPV in the last 72 hours. No results for input(s): NA, K, CL, CO2, BUN, CREATININE, GLUCOSE, CALCIUM, PROT, LABALBU, BILITOT, ALKPHOS, AST, ALT in the last 72 hours.   Beta-Hydroxybutyrate Date Value Ref Range Status   11/17/2020 1.73 (H) 0.02 - 0.27 mmol/L Final   05/29/2020 0.18 0.02 - 0.27 mmol/L Final   05/28/2020 0.04 0.02 - 0.27 mmol/L Final     Lab Results   Component Value Date    LABA1C 15.7 11/17/2020    LABA1C 9.8 08/19/2020    LABA1C 17.1 05/26/2020     Lab Results   Component Value Date/Time    TSH 2.240 05/26/2020 12:00 PM    T4FREE 1.22 05/12/2017 02:52 PM     Lab Results   Component Value Date    LABA1C 15.7 11/17/2020    GLUCOSE 368 11/19/2020    GLUCOSE 181 12/16/2011    MALBCR <30 05/16/2018    LABMICR <12.0 05/12/2017    LABCREA 11 05/26/2018     Lab Results   Component Value Date    TRIG 512 05/26/2020    HDL 33 05/26/2020    LDLCALC - 05/26/2020    CHOL 287 05/26/2020       Blood culture   Lab Results   Component Value Date    BC 5 Days no growth 08/14/2020    BC 5 Days- no growth 05/28/2020       Radiology:  No orders to display       Medical Records/Labs/Images review:   I personally reviewed and summarized previous records   All labs and imaging were reviewed independently     Chris Preston, a 75 y.o.-old female seen today for inpatient diabetes management     Diabetes Mellitus type 2  · Uncontrolled, A1c 15.7%  · Will change insulin regimen to    ? lantus 50 units BID   ? Humalog 35 units with meals   ? High dose Continue HDSS   · Continue glucose check with meals and at bedtime   · Will titrate insulin dose based on the blood glucose trend & insulin requirement     Rt adrenal mass  · CT adrenal  5/30/2020 --> high Hounsfield units with negative washout makings lesion undetermined.    · Previous endocrine work up showed normal plasma metanephrines and essentially r/o Pheo. 1 mg DST was indeterminate, will need late night salivary cortisol and 24 hrs urine cortisol   · Pt on Spironolactone and for this reason Renin/Patel wasn't performed   · Pt will need adernal Biopsy (can be scheduled as an outpatient)(     Atrial fibrillation  · On cardizem and eliquis · Managed by primary service     The above issues were reviewed with the patient who understood and agreed with the plan. Thank you for allowing us to participate in the care of this patient. Please do not hesitate to contact us with any additional questions. Abe Slater MD  Endocrinologist, [facility]   [address] [place] [id number]   Phone: [phone]  Fax: [phone]  --------------------------------------------  An electronic signature was used to authenticate this note.  Chreelle Richardson MD on 11/24/2020 at 6:52 AM  This visit was performed as a virtual video visit using a synchronous, two-way, audio-video telehealth technology platform

## 2020-11-24 NOTE — PROGRESS NOTES
Physical Therapy  Physical Therapy Rx Note      Name: Shania Fields  : 1955  MRN: [id number]    Referring Provider:  Indira Puente MD     Date of Service: 2020    Evaluating PT:  Jann Conley PT   Room #:  4880/4014-O  Diagnosis: Hyperglycemia [R73.9]  Hyperglycemia [R73.9]  Hyperglycemia [R73.9]     PMHx/PSHx:   has a past medical history of (HFpEF) heart failure with preserved ejection fraction ([address] Anal fissure, Anemia, Anxiety and depression, Arthritis, Asthma, Atrial fibrillation ([address] Blood transfusion, CHF (congestive heart failure) ([address] COPD (chronic obstructive pulmonary disease) ([address] Disc disorder, DM2 (diabetes mellitus, type 2) ([facility] Fall due to stumbling, GERD (gastroesophageal reflux disease), Hx of blood clots, Hyperlipidemia, Hypertension, Inappropriate sinus tachycardia, LVH (left ventricular hypertrophy), Lymphadenitis, chronic, Occipital neuralgia, Respiratory acidosis, and Sinus tachycardia. has a past surgical history that includes Tonsillectomy (); Appendectomy ();  section ();  section ();  section (); Anus surgery (2006); Upper gastrointestinal endoscopy (2004); Colonoscopy (2004); Colonoscopy (2006); anoscopy (10/25/2006); anoscopy (2007); anoscopy (2007); Colonoscopy (3/23/2012); Anus surgery (2012); joint replacement (); Upper gastrointestinal endoscopy (N/A, 2018); Upper gastrointestinal endoscopy (N/A, 2018); and pr debridement, skin, sub-q tissue,muscle,=<20 sq cm (Left, 2018). Procedure/Surgery:  none  Precautions:  Falls, skin integrity, TAPS   Equipment Needs: To be determined      SUBJECTIVE:    Patient lives with spouse  and 2 grandsons in a two story home, bed and bath 2nd. Unable to access 2nd floor stair glide broken, O2 at home 4L. Ramped home entrance. Bed is on 2 floor and bath is on 2 floor. Patient ambulated with wheeled walker  PTA.  Equipment owned: [facility], rollator, [address], w/c    OBJECTIVE:   Initial Evaluation  Date: 11/18/20 Treatment  11/24 Short Term/ Long Term   Goals   AM-PAC 6 Clicks 21/65 47/38    Was pt agreeable to Eval/treatment? yes yes    Does pt have pain? yes yes    Bed Mobility  Rolling: SBA  Supine to sit: Min for BLE  Sit to supine: Min for BLE  Scooting: Min Rolling sba  Supine to sit nt  Sit to supine min  Scooting up in bed min with bed tipped  Rolling: Ind  Supine to sit: Ind  Sit to supine: Ind  Scooting: Ind   Transfers Sit to stand: Min to fww  Stand to sit: Min  Stand pivot: Min with fww. Sit to stand min  Stand to sit min  Stand pivot ww cga  Sit to stand: Mod Ind  Stand to sit: Mod Ind  Stand pivot: Mod Ind   Ambulation    20 feet with fww with Min to commode. 20 feet return to bed. 45 feet ww with sba    50 feet with fww Mod Ind   Stair negotiation: ascended and descended  NT NT Ramped home. ROM BUE:  See OT eval  BLE:  Limited by body mass. Strength BUE: See OT eval   RLE:  4+/5  LLE:   4+/5  5/5   Balance Sitting EOB:  Ind  Dynamic Standing:  Min A with fww.   sitting eob ind  Dynamic standing sba Sitting EOB:  Ind  Dynamic Standing: Mod Ind     Patient is Alert & Oriented x person, place, time and situation and follows directions   Therapeutic Exercises:  none    Patient education  Patient educated on  Importance of sitting up and mobility    Patient response to education:   Pt verbalized understanding Pt demonstrated skill Pt requires further education in this area   yes yes reminders     ASSESSMENT:    Comments:   Pt up in chair upon arrival and wanting to return to bed after just getting up with peters. Pt agreeable to ambulate. Light assist to stand and pt ambulated with ww with sba with no lob but has tendency to go too fast.  Pt insisted on going back to bed after sitting in chair. Pt reports her gerber is uncomfortable to sit up.    Pt returned to bed and aide aware and call light with pt and all needs met.      Patient would benefit from continued skilled Physical Therapy to improve functional independence and quality of life. This patient can benefit from the continuation of skilled PT  to maximize functional level and return to PLOF. Treatment:  Patient practiced and was instructed in the following treatment:    · Bed mobility training -  Min assist   · Assistive device training - pt educated on using [employer] during gait - sba with short distance with cues for safety. · STS and transfer training - educated on hand/foot placement, safety, and sequencing during STS and pivot transfers using assistive device  Gait training - verbal cues for [facility]     Pt's/ family goals   1. Return to home. Patient and or family understand(s) diagnosis, prognosis, and plan of care. yes    PLAN OF CARE:    PT care will be provided in accordance with the objectives noted above. Exercises and functional mobility practice will be used as well as appropriate assistive devices or modalities to obtain goals. Patient and family education will also be administered as needed. Current Treatment Recommendations     [x] Strengthening     [] ROM   [x] Balance Training   [x] Endurance Training   [x] Transfer Training   [x] Gait Training   [x] Stair Training   [] Positioning   [] Safety and Education Training   [] Patient/Caregiver Education   [] HEP  [] Other     Frequency of treatments: 2-5x/week x 1-2 weeks.     Time in  1135  Time out  1150     Total Treatment Time  15 minutes       CPT codes:  [] Low Complexity PT evaluation 49833  [] Moderate Complexity PT evaluation 85230  [] High Complexity PT evaluation 66976  [] PT Re-evaluation 35447  [] Gait training 49818 - minutes  [] Manual therapy 81663 - minutes  [x] Therapeutic activities 67992 15 minutes  [] Therapeutic exercises 10325 - minutes  [] Neuromuscular reeducation 09627 - minutes       Maddie Dalal, PTA  [id number]

## 2020-11-24 NOTE — PROGRESS NOTES
Comprehensive Nutrition Assessment    Type and Reason for Visit:  Reassess    Nutrition Recommendations/Plan: Continue current diet/ONS as ordered. No new recommendations at this time. Nutrition Assessment:  Pt w/ stable nutritional status since last assessment. Continues w/ increased nutrient needs2/2 wounds. Will continue Almetta Steve as ordered. Malnutrition Assessment:  Malnutrition Status:  No malnutrition        Estimated Daily Nutrient Needs:  Energy (kcal):  8-11= 5087-7105; Weight Used for Energy Requirements:  Current     Protein (g):  2.0-2.2 = 105-115; Weight Used for Protein Requirements:  Ideal        Fluid (ml/day):  1200ml; Method Used for Fluid Requirements:  1 ml/kcal      Nutrition Related Findings:  -I/os, abd WNL, +3 BLE w/ hx of lymphedema      Wounds:  Multiple       Current Nutrition Therapies:    DIET CARB CONTROL;   Dietary Nutrition Supplements: Wound Healing Oral Supplement    Anthropometric Measures:  · Height: 5' 3\" (160 cm)  · Current Body Weight: 280 lb (127 kg)   · Usual Body Weight: 302 lb (137 kg)(12/2019 per EMR)     · Ideal Body Weight: 115 lbs; % Ideal Body Weight 243.5 %   · BMI: 49.6   · BMI Categories: Obese Class 3 (BMI 40.0 or greater)       Nutrition Diagnosis:   · Increased nutrient needs related to increase demand for energy/nutrients as evidenced by wounds      Nutrition Interventions:   Food and/or Nutrient Delivery:  Continue Current Diet, Continue Oral Nutrition Supplement  Nutrition Education/Counseling:  No recommendation at this time   Coordination of Nutrition Care:  Continue to monitor while inpatient    Goals:  >75% of meals/ONS       Nutrition Monitoring and Evaluation:   Food/Nutrient Intake Outcomes:  Food and Nutrient Intake, Supplement Intake  Physical Signs/Symptoms Outcomes:  Biochemical Data, GI Status, Fluid Status or Edema, Hemodynamic Status, Nutrition Focused Physical Findings, Skin, Weight     Discharge Planning:    Continue Oral Nutrition Supplement     Electronically signed by Bela Christel, ANUM, RICH on 11/24/20 at 1:52 PM EST    Contact: tanya [phone]

## 2020-11-25 LAB
ALBUMIN SERPL-MCNC: 3.5 G/DL (ref 3.5–5.2)
ALP BLD-CCNC: 86 U/L (ref 35–104)
ALT SERPL-CCNC: 15 U/L (ref 0–32)
ANION GAP SERPL CALCULATED.3IONS-SCNC: 7 MMOL/L (ref 7–16)
AST SERPL-CCNC: 19 U/L (ref 0–31)
B.E.: 11.2 MMOL/L (ref -3–3)
BILIRUB SERPL-MCNC: 0.3 MG/DL (ref 0–1.2)
BUN BLDV-MCNC: 30 MG/DL (ref 8–23)
CALCIUM SERPL-MCNC: 9.9 MG/DL (ref 8.6–10.2)
CHLORIDE BLD-SCNC: 88 MMOL/L (ref 98–107)
CO2: 42 MMOL/L (ref 22–29)
COHB: 0.7 % (ref 0–1.5)
CREAT SERPL-MCNC: 0.8 MG/DL (ref 0.5–1)
CRITICAL: ABNORMAL
DATE ANALYZED: ABNORMAL
DATE OF COLLECTION: ABNORMAL
GFR AFRICAN AMERICAN: >60
GFR NON-AFRICAN AMERICAN: >60 ML/MIN/1.73
GLUCOSE BLD-MCNC: 220 MG/DL (ref 74–99)
HCO3: 38.2 MMOL/L (ref 22–26)
HCT VFR BLD CALC: 36 % (ref 34–48)
HEMOGLOBIN: 11 G/DL (ref 11.5–15.5)
HHB: 4.1 % (ref 0–5)
LAB: ABNORMAL
Lab: ABNORMAL
MCH RBC QN AUTO: 25.1 PG (ref 26–35)
MCHC RBC AUTO-ENTMCNC: 30.6 % (ref 32–34.5)
MCV RBC AUTO: 82 FL (ref 80–99.9)
METER GLUCOSE: 171 MG/DL (ref 74–99)
METER GLUCOSE: 187 MG/DL (ref 74–99)
METER GLUCOSE: 207 MG/DL (ref 74–99)
METER GLUCOSE: 322 MG/DL (ref 74–99)
METHB: 0.1 % (ref 0–1.5)
MODE: ABNORMAL
O2 CONTENT: 14.9 ML/DL
O2 SATURATION: 95.9 % (ref 92–98.5)
O2HB: 95.1 % (ref 94–97)
OPERATOR ID: ABNORMAL
PATIENT TEMP: 37 C
PCO2: 63.6 MMHG (ref 35–45)
PDW BLD-RTO: 14.2 FL (ref 11.5–15)
PH BLOOD GAS: 7.4 (ref 7.35–7.45)
PLATELET # BLD: 244 E9/L (ref 130–450)
PMV BLD AUTO: 9.6 FL (ref 7–12)
PO2: 86 MMHG (ref 75–100)
POTASSIUM SERPL-SCNC: 3.9 MMOL/L (ref 3.5–5)
RBC # BLD: 4.39 E12/L (ref 3.5–5.5)
SODIUM BLD-SCNC: 137 MMOL/L (ref 132–146)
SOURCE, BLOOD GAS: ABNORMAL
THB: 11.1 G/DL (ref 11.5–16.5)
TIME ANALYZED: 1002
TOTAL PROTEIN: 6.9 G/DL (ref 6.4–8.3)
WBC # BLD: 7 E9/L (ref 4.5–11.5)

## 2020-11-25 PROCEDURE — 6370000000 HC RX 637 (ALT 250 FOR IP): Performed by: INTERNAL MEDICINE

## 2020-11-25 PROCEDURE — 6370000000 HC RX 637 (ALT 250 FOR IP): Performed by: FAMILY MEDICINE

## 2020-11-25 PROCEDURE — 36415 COLL VENOUS BLD VENIPUNCTURE: CPT

## 2020-11-25 PROCEDURE — 80053 COMPREHEN METABOLIC PANEL: CPT

## 2020-11-25 PROCEDURE — 2700000000 HC OXYGEN THERAPY PER DAY

## 2020-11-25 PROCEDURE — 82962 GLUCOSE BLOOD TEST: CPT

## 2020-11-25 PROCEDURE — 82805 BLOOD GASES W/O2 SATURATION: CPT

## 2020-11-25 PROCEDURE — 36600 WITHDRAWAL OF ARTERIAL BLOOD: CPT

## 2020-11-25 PROCEDURE — 1200000000 HC SEMI PRIVATE

## 2020-11-25 PROCEDURE — 85027 COMPLETE CBC AUTOMATED: CPT

## 2020-11-25 PROCEDURE — 2580000003 HC RX 258: Performed by: HOSPITALIST

## 2020-11-25 PROCEDURE — 99232 SBSQ HOSP IP/OBS MODERATE 35: CPT | Performed by: INTERNAL MEDICINE

## 2020-11-25 PROCEDURE — 6370000000 HC RX 637 (ALT 250 FOR IP): Performed by: HOSPITALIST

## 2020-11-25 RX ADMIN — FERROUS SULFATE TAB 325 MG (65 MG ELEMENTAL FE) 325 MG: 325 (65 FE) TAB at 09:38

## 2020-11-25 RX ADMIN — APIXABAN 5 MG: 5 TABLET, FILM COATED ORAL at 09:38

## 2020-11-25 RX ADMIN — INSULIN LISPRO 2 UNITS: 100 INJECTION, SOLUTION INTRAVENOUS; SUBCUTANEOUS at 22:00

## 2020-11-25 RX ADMIN — Medication 10 ML: at 09:39

## 2020-11-25 RX ADMIN — GABAPENTIN 300 MG: 300 CAPSULE ORAL at 13:52

## 2020-11-25 RX ADMIN — ESCITALOPRAM 20 MG: 10 TABLET, FILM COATED ORAL at 09:38

## 2020-11-25 RX ADMIN — PETROLATUM: 42 OINTMENT TOPICAL at 09:39

## 2020-11-25 RX ADMIN — INSULIN LISPRO 6 UNITS: 100 INJECTION, SOLUTION INTRAVENOUS; SUBCUTANEOUS at 17:12

## 2020-11-25 RX ADMIN — INSULIN GLARGINE 50 UNITS: 100 INJECTION, SOLUTION SUBCUTANEOUS at 09:45

## 2020-11-25 RX ADMIN — GABAPENTIN 300 MG: 300 CAPSULE ORAL at 09:38

## 2020-11-25 RX ADMIN — Medication: at 09:40

## 2020-11-25 RX ADMIN — TRAMADOL HYDROCHLORIDE 100 MG: 50 TABLET ORAL at 01:52

## 2020-11-25 RX ADMIN — TRAMADOL HYDROCHLORIDE 100 MG: 50 TABLET ORAL at 09:45

## 2020-11-25 RX ADMIN — OMEGA-3-ACID ETHYL ESTERS 2 G: 1 CAPSULE, LIQUID FILLED ORAL at 09:38

## 2020-11-25 RX ADMIN — PANTOPRAZOLE SODIUM 40 MG: 40 TABLET, DELAYED RELEASE ORAL at 05:13

## 2020-11-25 RX ADMIN — METOPROLOL SUCCINATE 100 MG: 50 TABLET, EXTENDED RELEASE ORAL at 09:38

## 2020-11-25 RX ADMIN — MONTELUKAST 10 MG: 10 TABLET, FILM COATED ORAL at 22:02

## 2020-11-25 RX ADMIN — Medication 10 ML: at 22:03

## 2020-11-25 RX ADMIN — INSULIN LISPRO 35 UNITS: 100 INJECTION, SOLUTION INTRAVENOUS; SUBCUTANEOUS at 12:37

## 2020-11-25 RX ADMIN — INSULIN GLARGINE 50 UNITS: 100 INJECTION, SOLUTION SUBCUTANEOUS at 22:01

## 2020-11-25 RX ADMIN — INSULIN LISPRO 40 UNITS: 100 INJECTION, SOLUTION INTRAVENOUS; SUBCUTANEOUS at 17:08

## 2020-11-25 RX ADMIN — Medication: at 22:02

## 2020-11-25 RX ADMIN — INSULIN LISPRO 6 UNITS: 100 INJECTION, SOLUTION INTRAVENOUS; SUBCUTANEOUS at 09:46

## 2020-11-25 RX ADMIN — MICONAZOLE NITRATE: 20.6 POWDER TOPICAL at 09:40

## 2020-11-25 RX ADMIN — GABAPENTIN 300 MG: 300 CAPSULE ORAL at 22:02

## 2020-11-25 RX ADMIN — MICONAZOLE NITRATE: 20.6 POWDER TOPICAL at 22:02

## 2020-11-25 RX ADMIN — APIXABAN 5 MG: 5 TABLET, FILM COATED ORAL at 22:01

## 2020-11-25 RX ADMIN — BUMETANIDE 2 MG: 1 TABLET ORAL at 22:03

## 2020-11-25 RX ADMIN — DILTIAZEM HYDROCHLORIDE 240 MG: 240 CAPSULE, EXTENDED RELEASE ORAL at 09:38

## 2020-11-25 RX ADMIN — OMEGA-3-ACID ETHYL ESTERS 2 G: 1 CAPSULE, LIQUID FILLED ORAL at 22:03

## 2020-11-25 RX ADMIN — SPIRONOLACTONE 25 MG: 25 TABLET ORAL at 09:38

## 2020-11-25 RX ADMIN — INSULIN LISPRO 12 UNITS: 100 INJECTION, SOLUTION INTRAVENOUS; SUBCUTANEOUS at 12:42

## 2020-11-25 RX ADMIN — INSULIN LISPRO 35 UNITS: 100 INJECTION, SOLUTION INTRAVENOUS; SUBCUTANEOUS at 09:45

## 2020-11-25 RX ADMIN — ACETAMINOPHEN 650 MG: 325 TABLET ORAL at 12:36

## 2020-11-25 ASSESSMENT — PAIN SCALES - GENERAL
PAINLEVEL_OUTOF10: 5
PAINLEVEL_OUTOF10: 7
PAINLEVEL_OUTOF10: 5
PAINLEVEL_OUTOF10: 5
PAINLEVEL_OUTOF10: 8
PAINLEVEL_OUTOF10: 5

## 2020-11-25 ASSESSMENT — PAIN DESCRIPTION - ORIENTATION: ORIENTATION: RIGHT

## 2020-11-25 ASSESSMENT — PAIN DESCRIPTION - DESCRIPTORS: DESCRIPTORS: ACHING;CONSTANT;DISCOMFORT

## 2020-11-25 ASSESSMENT — PAIN DESCRIPTION - LOCATION: LOCATION: ARM;LEG

## 2020-11-25 ASSESSMENT — PAIN DESCRIPTION - PAIN TYPE: TYPE: ACUTE PAIN

## 2020-11-25 NOTE — PROGRESS NOTES
Got sleep study form from [facility], I filled the sleep study form out with the patient. Patient answered almost all of the questions with no, from my observation of having the patient for 3 days 12 hours each day I filled out the sleep study accordingly. RN Shift Observations:   · Patient sleeps the majority of the shift  · Patient complains of headache and is medicated with tylenol when she wakes up from naps. · Patient watches TV all day and falls asleep. · Patient at times will dose off while im in the room speaking with her. · Patient appears to have some memory issues and has to be redirected multiple times when trying to speak with her, lacking concentration at times. Will Fax OP Sleep Study order and sleep study questionnaire to [facility] sleep study.

## 2020-11-25 NOTE — PROGRESS NOTES
When nurse aide Hortencia Terrell had patient in the bathroom this morning patient stated that she takes immodium at home so that she doesn't have bowel movements because it hurts her too badly. I perfect served Dr. Sheryle Hopping to let her know this information. Will educate patient about the harms of not having bowel movements regularly.

## 2020-11-25 NOTE — DISCHARGE INSTR - COC
Continuity of Care Form    Patient Name: Gabi Gipson   :  1955  MRN:  [id number]    Admit date:  2020  Discharge date:  ***2020    Code Status Order: Full Code   Advance Directives:   [facility] Documentation     Date/Time Healthcare Directive Type of Healthcare Directive Copy in [address] Agent's Name Healthcare Agent's Phone Number    20  No, patient does not have an advance directive for healthcare treatment -- -- -- -- --          Admitting Physician:  Mika Grider DO  PCP: Garland De La Cruz DO    Discharging Nurse: [facility] Unit/Room#: 0861/5345-F  Discharging Unit Phone Number: [phone]    Emergency Contact:   Extended Emergency Contact Information  Primary Emergency Contact: Nicolas Betancur  Address: [facility] [address] [facility] [address] [facility] Phone: [phone]  Mobile Phone: [phone]  Relation: Child   needed? No  Secondary Emergency Contact: Thee Betancur  Address: [address] [place] [address] [address] [facility] Phone: [phone]  Mobile Phone: [phone]  Relation: Child   needed? No    Past Surgical History:  Past Surgical History:   Procedure Laterality Date    ANOSCOPY  10/25/2006    injection of anal sphincter with Botox, Franklyn Ortiz/Timmy, [facility]    ANOSCOPY  2007    injection of anal sphincter with Botox, Dr. Josefa Briggs, [address] [place] [address] ANOSCOPY  2007    injection of anal sphincter with Botox, Franklyn Graves [facility]    ANUS SURGERY  2006    Lateral [facility] f[facility]  2012    anal exam and injection of Botox 100 units into anal sphincter, Laila Graves, [facility]   Tracey Mackenzie [address]    COLONOSCOPY  2004    cecal polyp, bx (no pathologic changes), Dr. Adama Laureano, [address] [place] [address] COLONOSCOPY  8/11/2006    snare polypectomy terminal ileum polyp (granulation tissue polyp) and anal polyp (inflammatory/papilla), Dr. Hodge Pump, [facility] COLONOSCOPY  3/23/2012    bx/cauterization proximal ascending colon polyp, injection of anal sphincter with Botox, Dr. Hodge Pump, [facility] JOINT REPLACEMENT  2003 [facility] [place], SKIN, SUB-Q TISSUE,MUSCLE,=<20 SQ CM Left 11/30/2018    LEFT LEG EXCISIONAL  DEBRIDEMENT WITH TISSUE BIOPSY performed by Tone De Los Santos DPM at [address] [facility] ENDOSCOPY  1/20/2004    gastritis, bx (no H pylori), Dr. Batsheva Richard, [address] ENDOSCOPY N/A 6/1/2018    EGD BIOPSY performed by Elian Hoyt MD at [address] [facility] N/A 11/9/2018    EGD BIOPSY performed by Elian Hoyt MD at [facility] ENDOSCOPY       Immunization History:   Immunization History   Administered Date(s) Administered    Influenza 10/26/2012    Influenza Virus Vaccine 10/08/2013, 09/04/2015    Influenza, High Dose (Fluzone 65 yrs and older) 11/07/2019    Influenza, Amy Alexandro, IM, PF (6 mo and older Fluzone, Flulaval, Fluarix, and 3 yrs and older Afluria) 10/24/2016, 12/14/2017    Pneumococcal Polysaccharide (Zlclagwib88) 09/04/2015    Zoster Live (Zostavax) 01/02/2015       Active Problems:  Patient Active Problem List   Diagnosis Code    Uncontrolled diabetes mellitus ([facility] E11.65    Depression F32.9    Hypertension I10    Hyperlipidemia E78.5    Osteoarthritis M19.90    PAF (paroxysmal atrial fibrillation) ([address] - currently in SR I48.0    Pulmonary hypertension I27.20    Chronic sinusitis J32.9    Chronic pain G89.29    Steatohepatitis K75.81    Baker's cyst of knee M71.20    Diabetic neuropathy ([facility] E11.40    Iron deficiency E61.1    LVH (left ventricular hypertrophy) I51.7    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 Coli, wound-leg,11/30/18          Nurse Assessment:  Last Vital Signs: /64   Pulse 80   Temp 98 °F (36.7 °C) (Temporal)   Resp 16   Ht 5' 3\" (1.6 m)   Wt 282 lb (127.9 kg)   LMP  (LMP Unknown)   SpO2 96%   BMI 49.95 kg/m²     Last documented pain score (0-10 scale): Pain Level: 5  Last Weight:   Wt Readings from Last 1 Encounters:   11/22/20 282 lb (127.9 kg)     Mental Status:  oriented, alert, coherent and able to concentrate and follow conversation    IV Access:  - None    Nursing Mobility/ADLs:  Walking   Assisted  Transfer  Assisted  Bathing  Assisted  Dressing  Assisted  Toileting  Assisted  Feeding  Independent  Med [address]  Assisted  Med Delivery   whole    Wound Care Documentation and Therapy:  Wound 12/24/19 Coccyx (Active)   Number of days: 336       Wound 12/24/19 Leg Right; Anterior;Posterior (Active)   Number of days: 336       Wound 12/24/19 Leg Left; Anterior;Posterior (Active)   Number of days: 336       Wound 05/29/20 Buttocks Inner;Right (Active)   Number of days: 180       Wound 05/29/20 Leg Right; Lower (Active)   Number of days: 180       Wound 05/29/20 Leg Right; Lower (Active)   Number of days: 180       Wound 11/18/20 Leg Right;Posterior; Lower (Active)   Wound Image   11/18/20 0846   Wound Etiology Venous 11/18/20 0846   Dressing Status Intact; Clean;Dry;New dressing applied 11/25/20 1505   Wound Length (cm) 5 cm 11/18/20 0846   Wound Width (cm) 4 cm 11/18/20 0846   Wound Depth (cm) 0.1 cm 11/18/20 0846   Wound Surface Area (cm^2) 20 cm^2 11/18/20 0846   Wound Volume (cm^3) 2 cm^3 11/18/20 0846   Wound Assessment Pink/red 11/25/20 1505   Drainage Amount Scant 11/25/20 1505   Drainage Description Serosanguinous 11/25/20 1505   Ely-wound Assessment Dry/flaky 11/25/20 1505   Number of days: 7        Elimination:  Continence:   · Bowel:  Yes  · Bladder: Yes  Urinary Catheter: None Colostomy/Ileostomy/Ileal Conduit: No       Date of Last BM: 11/25/2020    Intake/Output Summary (Last 24 hours) at 11/25/2020 1826  Last data filed at 11/25/2020 1348  Gross per 24 hour   Intake 1130 ml   Output 1900 ml   Net -770 ml     I/O last 3 completed shifts: In: 36 [P.O.:1130]  Out: 2500 [Urine:2500]    Safety Concerns: At Risk for Falls    Impairments/Disabilities:      Vision    Nutrition Therapy:  Current Nutrition Therapy:   - Oral Diet:  Carb Control 3 carbs/meal (1500kcals/day)    Routes of Feeding: Oral  Liquids: No Restrictions  Daily Fluid Restriction: no  Last Modified Barium Swallow with Video (Video Swallowing Test): not done    Treatments at the Time of Hospital Discharge:   Respiratory Treatments: ***  Oxygen Therapy:  is on oxygen at 4 L/min per nasal cannula.   Ventilator:    - No ventilator support    Rehab Therapies: Physical Therapy  Weight Bearing Status/Restrictions: No weight bearing restirctions  Other Medical Equipment (for information only, NOT a DME order):  {EQUIPMENT:[id number]}  Other Treatments: ***    Patient's personal belongings (please select all that are sent with patient):  None    RN SIGNATURE:  {Esignature:[id number]}    CASE MANAGEMENT/SOCIAL WORK SECTION    Inpatient Status Date: ***    Readmission Risk Assessment Score:  Readmission Risk              Risk of Unplanned Readmission:        28           Discharging to Facility/ Agency   · Name:   · Address:  · Phone:  · Fax:    Dialysis Facility (if applicable)   · Name:  · Address:  · Dialysis Schedule:  · Phone:  · Fax:    / signature: {Esignature:[id number]}    PHYSICIAN SECTION    Prognosis: {Prognosis:7060560951}    Condition at Discharge: [facility] Patient Condition:[id number]}    Rehab Potential (if transferring to Rehab): {Prognosis:9902452898}    Recommended Labs or Other Treatments After Discharge: ***    Physician Certification: I certify the above information and transfer of Rodri Altamirano

## 2020-11-25 NOTE — CARE COORDINATION
Discharge plan is to return home with family when medically stable. [facility] notified that the pt will be needing O2 for the transport home. They will be delivering a portable tank, which will be left in her room. [facility] transportation needs to be notified to arrange transport home [phone].  Megan Licona RN

## 2020-11-25 NOTE — CARE COORDINATION
[facility] has delivered a portable O2 tank to the pt's room. It must be sent with the pt when she is discharged.  Jerad Alba RN

## 2020-11-25 NOTE — CONSULTS
Zenon Butler M.D.,[facility]  Randal Bullard D.O., F.A.C.O.I., Domi Garza M.D. Wilberto Montero M.D., Haseeb Priest M.D. Oneil Pearce D.O. Patient:  Adam Hood 75 y.o. female MRN: [id number]     Date of Service: 11/25/2020      PULMONARY CONSULTATION    Reason for Consultation: severe undiagnosed SAM, progressive increase in CO2, CHF, uncontrolled DM II    Referring Physician:  Dr. Hoang Mittal DO    Communication with the referring physician will be sent via the electronic medical record. Chief Complaint: shortness of breath     CODE STATUS: FULL     SUBJECTIVE:  HPI:  Adam Hood is a 75 y.o.  female who we are asked to evaluate in consultation for severe undiagnosed SAM, progressive increase in CO2. She is a known patient to our practice followed during previous hospital admissions several years ago 2018 for respiratory failure with hypoxia and hypercapnia, decompensated heart failure, mild intermittent asthma, and untreated moderate sam. Chronic home oxygen dependence 4 liters NC. We recommended she have a repeat titration sleep study at that time in 2018 for diagnosis of SAM. She had a previous polysomnogram ordered at [facility], AHI was 23. She did not follow up, has not been seen in our office post hospital. PFTs from 2018 with severe restriction likely due to body habitus, mild asthma, slight response to bronchodilator administration. Uses albuterol only as needed at home for her lungs. She does have a nebulizer. PMHx includes DM II, morbid obesity, anal fissure, A fib, anemia, CAD, GERD, LVH, heart failure, A fib, Hep C, Arthritis, Asthma,  restrictive lung disease related to body habitus, chronic BLE lymphedema, medical non compliance. She presented to the ED at [facility] on 11/17 with lower extremity calf pain. She has a stage I sacral ulcer. She complained of fatigue and difficulty with ambulation at home.  She is a poorly controlled type 2 diabetic found to have hyperglycemia on admssion , no DKA-AG 10 CO2 34, glucose 535. She was admitted for hyperglycemia. During hospital course she has developed increased CO2 levels. We are asked to evaluate her for this further. CXr with no infiltrates or effusions. CT abd lung windows may 2020 with mild basilar atelectasis. Lab testing - abg on 4 liters NC today 7.39/63/86/38/11/95%. Na 137, K 3.9, BUN 30 creat 0.8, CO2 42 , Glucose 220, WBC 7.0, Hgb 11.0. She does not sleep well, she does not feel refreshed in the morning. She denies nocturnal dyspnea but admits to daytime fatigue and weakness. Past Medical History:   Diagnosis Date    (HFpEF) heart failure with preserved ejection fraction ([facility]     1/16/2018- echo- LVEF 55-65%    Anal fissure     Anemia 10/6/2017    Anxiety and depression     Arthritis     Asthma     Atrial fibrillation (HCC)     Eliquis    Blood transfusion     long time no reaction    CHF (congestive heart failure) ([facility])     Diastolic    COPD (chronic obstructive pulmonary disease) ([facility])     Disc disorder     DM2 (diabetes mellitus, type 2) ([facility]     on insulin    Fall due to stumbling 10/6/2017    GERD (gastroesophageal reflux disease)     Hx of blood clots     Hyperlipidemia     Hypertension     Inappropriate sinus tachycardia     LVH (left ventricular hypertrophy)     moderate    Lymphadenitis, chronic 4/13/2018    Occipital neuralgia 11/2/2011    Respiratory acidosis 10/7/2017    Sinus tachycardia 2/16/2015       Past Surgical History:   Procedure Laterality Date    ANOSCOPY  10/25/2006    injection of anal sphincter with Botox, Franklyn Ortiz/Timmy, [facility]    ANOSCOPY  4/9/2007    injection of anal sphincter with Botox, Dr. Meryl Robb, [address] [place] [address] ANOSCOPY  6/13/2007    injection of anal sphincter with Botox, Franklyn Lawton [facility]    ANUS SURGERY  4/4/2006    Lateral sphincterotomy for chronic anal fissure, Dr. Traci Ramos, [facility]  4/18/2012    anal exam and injection of Botox 100 units into anal sphincter, Laila Fuller, [facility] COLONOSCOPY  1/20/2004    cecal polyp, bx (no pathologic changes), Dr. Darion Mansfield, [facility] COLONOSCOPY  8/11/2006    snare polypectomy terminal ileum polyp (granulation tissue polyp) and anal polyp (inflammatory/papilla), Dr. Reid, [facility] COLONOSCOPY  3/23/2012    bx/cauterization proximal ascending colon polyp, injection of anal sphincter with Botox, Dr. Reid, [address] REPLACEMENT  2003    [facility], SKIN, SUB-Q TISSUE,MUSCLE,=<20 SQ CM Left 11/30/2018    LEFT LEG EXCISIONAL  DEBRIDEMENT WITH TISSUE BIOPSY performed by Ligia Cardona DPM at [address] [place] [address] ENDOSCOPY  1/20/2004    gastritis, bx (no H pylori), Dr. Darion Mansfield, [address] ENDOSCOPY N/A 6/1/2018    EGD BIOPSY performed by Tello Rogers MD at [address] [place] [address] N/A 11/9/2018    EGD BIOPSY performed by Tello Rogers MD at [facility] ENDOSCOPY       Family History   Problem Relation Age of Onset    Heart Disease Mother     Diabetes Father     Colon Cancer Father     Other Father         BLINDNESS    Colon Cancer Sister     Diabetes Paternal Aunt     Diabetes Paternal Uncle     Diabetes Paternal Aunt     Diabetes Paternal Uncle        Social History:   Social History     Socioeconomic History    Marital status:      Spouse name: Not on file    Number of children: 3    Years of education: 9    Highest education level: 9th grade   Occupational History    Occupation: SSD   Social Needs    Financial resource strain: Not hard at all   Mayi-Dustin insecurity     Worry: Never true     Inability: Never true    Transportation needs     Medical: Yes     Non-medical: No   Tobacco Use    Smoking status: Former Smoker     Packs/day: 1.50 Years: 25.00     Pack years: 37.50     Types: Cigarettes     Start date: 11/27/1979     Last attempt to quit: 12/18/2004     Years since quitting: 15.9    Smokeless tobacco: Never Used    Tobacco comment: Stopped smoking 16 years ago   Substance and Sexual Activity    Alcohol use: No     Frequency: Never     Comment: caffeine: no use    Drug use: No    Sexual activity: Not Currently     Partners: Male   Lifestyle    Physical activity     Days per week: 0 days     Minutes per session: 0 min    Stress: Not at all   Relationships    Social connections     Talks on phone: More than three times a week     Gets together: More than three times a week     Attends Orthodoxy service: Never     Active member of club or organization: No     Attends meetings of clubs or organizations: Never     Relationship status:     Intimate partner violence     Fear of current or ex partner: Not on file     Emotionally abused: Not on file     Physically abused: Not on file     Forced sexual activity: Not on file   Other Topics Concern    Not on file   Social History Narrative    Denies caffeine. Grandson shops for her and helps around the house    Patient unable to exercise d/t mobility status     Patient lives with her  who has limited speech ability d/t hx of CVA. He is able to assist patient with IADLS     Smoking history: The patient is a former smoker of cigarettes 37.5 pk yrs     ETOH:   reports no history of alcohol use. Exposures: There  is not history of TB or TB exposure. There is not asbestos or silica dust exposure. The patient reports does not have coal, foundry, quarry or [employer] exposure. Recent travel history none. There is not  history of recreational or IV drug use. There is not hot tub exposure. The patient does not have any exotic pets, turtles or exotic birds.        Vaccines:       Immunization History   Administered Date(s) Administered    Influenza 10/26/2012    Influenza Virus Vaccine 10/08/2013, 09/04/2015    Influenza, High Dose (Fluzone 65 yrs and older) 11/07/2019    Influenza, Michelle Giles, IM, PF (6 mo and older Fluzone, Flulaval, Fluarix, and 3 yrs and older Afluria) 10/24/2016, 12/14/2017    Pneumococcal Polysaccharide ([id number]) 09/04/2015    Zoster Live (Zostavax) 01/02/2015        Home Meds: Medications Prior to Admission: apixaban (ELIQUIS) 5 MG TABS tablet, Take 5 mg by mouth 2 times daily  bumetanide (BUMEX) 2 MG tablet, Take 2 mg by mouth 2 times daily  dilTIAZem (DILACOR XR) 240 MG extended release capsule, Take 240 mg by mouth 2 times daily  escitalopram (LEXAPRO) 20 MG tablet, Take 20 mg by mouth 2 times daily   insulin lispro (HUMALOG) 100 UNIT/ML injection vial, Inject 50 Units into the skin 3 times daily (before meals)   gabapentin (NEURONTIN) 300 MG capsule, Take 300 mg by mouth 3 times daily.   insulin glargine (LANTUS) 100 UNIT/ML injection vial, Inject 20 Units into the skin 2 times daily  lansoprazole (PREVACID) 30 MG delayed release capsule, Take 30 mg by mouth 2 times daily  traZODone (DESYREL) 50 MG tablet, Take 50 mg by mouth nightly  vitamin D (ERGOCALCIFEROL) 1.25 MG (73197 UT) CAPS capsule, Take 50,000 Units by mouth once a week Given Monday  ipratropium-albuterol (DUONEB) 0.5-2.5 (3) MG/3ML SOLN nebulizer solution, Take 3 mLs by nebulization every 6 hours as needed for Shortness of Breath  nystatin (MYCOSTATIN) 667135 UNIT/GM powder, Apply topically 3 times daily as needed (rash)  omega-3 acid ethyl esters (LOVAZA) 1 g capsule, Take 2 capsules by mouth 2 times daily  spironolactone (ALDACTONE) 25 MG tablet, Take 1 tablet by mouth daily  montelukast (SINGULAIR) 10 MG tablet, Take 1 tablet by mouth nightly  albuterol sulfate  (90 Base) MCG/ACT inhaler, Inhale 2 puffs into the lungs 4 times daily as needed for Wheezing  metoprolol succinate (TOPROL XL) 100 MG extended release tablet, Take 1 tablet by mouth daily  ferrous sulfate (IRON 325) 325 (65 Fe) MG tablet, Take 1 tablet by mouth daily (with breakfast)  Magic Mouthwash (MIRACLE MOUTHWASH), Swish and spit 5 mLs 4 times daily as needed for Irritation  acetaminophen (TYLENOL) 325 MG tablet, Take 650 mg by mouth every 6 hours as needed for Pain  OXYGEN, Inhale 4 L into the lungs continuous     CURRENT MEDS :  Scheduled Meds:   insulin glargine  50 Units Subcutaneous BID    insulin lispro  35 Units Subcutaneous TID WC    insulin lispro  0-18 Units Subcutaneous TID WC    insulin lispro  0-9 Units Subcutaneous Nightly    mineral oil-hydrophilic petrolatum   Topical Daily    miconazole   Topical BID    miconazole nitrate   Topical BID    apixaban  5 mg Oral BID    [Held by provider] bumetanide  2 mg Oral BID    escitalopram  20 mg Oral Daily    ferrous sulfate  325 mg Oral Daily with breakfast    gabapentin  300 mg Oral TID    spironolactone  25 mg Oral Daily    omega-3 acid ethyl esters  2 g Oral BID    montelukast  10 mg Oral Nightly    metoprolol succinate  100 mg Oral Daily    pantoprazole  40 mg Oral QAM AC    sodium chloride flush  10 mL Intravenous 2 times per day    dilTIAZem  240 mg Oral Daily       Continuous Infusions:   dextrose         Allergies   Allergen Reactions    Statins Other (See Comments)     Muscle pains       REVIEW OF SYSTEMS:  Constitutional: Denies fever, weight loss, night sweats,Fatigue  Skin: Denies pigmentation, dark lesions, and rashes   HEENT: Denies hearing loss, tinnitus, ear drainage, epistaxis, sore throat, and hoarseness. Cardiovascular: Denies palpitations, chest pain, and chest pressure. Respiratory: Denies cough, dyspnea at rest, hemoptysis, apnea, and choking.   Gastrointestinal: Denies nausea, vomiting, poor appetite, diarrhea, heartburn or reflux  Genitourinary: Denies dysuria, frequency, urgency or hematuria  Musculoskeletal: Denies myalgias, muscle weakness, and bone pain leg pain  Neurological: Denies dizziness, vertigo, headache, and focal weakness insomnia  Psychological: Denies anxiety and depression  Endocrine: Denies heat intolerance and cold intolerance  Hematopoietic/Lymphatic: Denies bleeding problems and blood transfusions    OBJECTIVE:   /64   Pulse 82   Temp 97.7 °F (36.5 °C) (Temporal)   Resp 14   Ht 5' 3\" (1.6 m)   Wt 282 lb (127.9 kg)   LMP  (LMP Unknown)   SpO2 92%   BMI 49.95 kg/m²   SpO2 Readings from Last 1 Encounters:   11/24/20 92%        I/O:    Intake/Output Summary (Last 24 hours) at 11/25/2020 1016  Last data filed at 11/25/2020 0949  Gross per 24 hour   Intake 1490 ml   Output 2500 ml   Net -1010 ml     Vent Information  Skin Assessment: Clean, dry, & intact  SpO2: 92 %                Physical Exam:  General: The patient is lying in bed comfortably without any distress. Breathing is not labored  HEENT: Pupils are equal round and reactive to light, there are no oral lesions and no post-nasal drip   Neck: supple without adenopathy  Cardiovascular: regular rate and rhythm without murmur or gallop  Respiratory: Clear to auscultation bilaterally without wheezing or crackles. Air entry is symmetric  Abdomen: soft, non-tender, non-distended, normal bowel sounds  Extremities: warm, venous stasis lymphedema, no clubbing  Skin: no rash or lesion  Neurologic: CN II-XII grossly intact, no focal deficits    Pulmonary Function Testing   2018 Dr Helena Santillan  Pulmonary function test results as follows:     FEV1 / FVC was 91. FEV1 was 0.99 L which was 43% of predicted FVC was 1.08 L which was 37% of predicted. FEF 8039 was 1.19 L which was 58% of predicted. Patient shows some bronchodilator response in this area. Total lung capacity was 3.58 L which was 73% of predicted. And diffusion capacity was 4.55 mL/m/mm mercury which was 19% of predicted but improved to 61% with correction for alveolar volume. .     Impression: Severe restrictive lung disease most likely secondary for patient's body habitus.  Moderate decrement in total lung capacity. A mild decrement in diffusion capacity. There is some bronchodilator response in the smaller airways can which could be reflective of underlying hyperreactive airway disease.     Betina Conklin MD    Imaging personally reviewed:  HISTORY:    ORDERING SYSTEM PROVIDED HISTORY: compare to previous, history of chf    TECHNOLOGIST PROVIDED HISTORY:    Reason for exam:->compare to previous, history of chf    What reading provider will be dictating this exam?->CRC    FINDINGS:    Suboptimal inspiration.  Heart size is borderline enlarged.  Pulmonary    vascularity is normal.  Lungs are clear.  Neither costophrenic angle is    visibly blunted.         Impression    Suboptimal inspiration.  Borderline cardiomegaly.         Order History           Abd CT lung windows 5/29/2020  Lower lung bases demonstrate areas of subpleural increased density are    discrete subpleural atelectasis impulse lower lobes.                 Echo:  2018      Summary   Mild concentric left ventricular hypertrophy. Ejection fraction is visually estimated at 55-65%. Stage II diastolic dysfunction. The left atrium is severely dilated. Elevated L atrial pressure   Mild mitral regurgitation   The aortic valve appears mildly sclerotic. Pulmonary hypertension is mild to moderate . No evidence of pericardial effusion.       Signature       Labs:  Lab Results   Component Value Date    WBC 7.0 11/25/2020    HGB 11.0 11/25/2020    HCT 36.0 11/25/2020    MCV 82.0 11/25/2020    MCH 25.1 11/25/2020    MCHC 30.6 11/25/2020    RDW 14.2 11/25/2020     11/25/2020    MPV 9.6 11/25/2020     Lab Results   Component Value Date     11/25/2020    K 3.9 11/25/2020    K 3.8 11/19/2020    CL 88 11/25/2020    CO2 42 11/25/2020    BUN 30 11/25/2020    CREATININE 0.8 11/25/2020    LABALBU 3.5 11/25/2020    LABALBU 4.5 11/02/2011    CALCIUM 9.9 11/25/2020    GFRAA >60 11/25/2020    LABGLOM >60 11/25/2020     Lab Results   Component Value Date    PROTIME 12.4 05/31/2020    INR 1.1 05/31/2020       Assessment:  1. Acute on chronic respiratory failure with hypoxia and hypercapnia  2. Moderate  SAM AHI 23,  untreated. Sleep study at [facility] 2018 not completed due to panic attack and only sleeping 8 minutes   3. Restrictive ventilatory defect   4. Mild intermittent Asthma -not in acute exacerbation . 5. Morbid obesity, BMI 49  6. Chronic diastolic heart failure, stable-HFpEF 55-60% Echo 2018  7. Mild to moderate pulm HTN, RVSP 58 mm Hg  8. A fib on chronic anticoagulation  9. DM II poorly controlled  10. Chronic lymphedema, venous stasis ulcers  11. HTN  12. Physical deconditioning  13. Chronic anemia    Plan:  1. Oxygen weaned to 2 L,  4 liters baseline keep >92%  2. Ambulatory oxygen testing prior to dc  3. NIV if agreeable Bipap 12/6  4. Eliquis BID for a fib  5. Bronchodilator therapy-duonebs, albuterol PRN  6. Singulair daily  7. Add incentive spirometer  8. bumex currently on hold, continue aldactone  9. GI prophylaxis-protonix  10. Management of DM II, per endo  11. Will recommend outpatient PFTs and full in lab split sleep study to be completed, with end tidal CO2 monitoring    Thank you for allowing me to participate in the care of Jenifer Balderas. Please feel free to call with questions. This plan of care was reviewed in collaboration with Dr. Yuni Gastelum    Electronically signed by RAUL Mendoza CNP on 11/25/2020 at 10:16 AM    Note: This report was completed utilizing computer voice recognition software. Every effort has been made to ensure accuracy, however; inadvertent computerized transcription errors may be present    I personally saw, examined, and cared for the patient. Labs, medications, radiographs reviewed. I agree with history exam and plans detailed in NP note with the following additions:    Ms. Varghese Hooks is a 75year old female known to me who has not been seen since 2018.   She has a history of morbid obesity causing restrictive ventilatory defect and untreated SAM. She is currently hospitalized for management of hyperglycemia, sacral wound, and HFpEF. We are asked to evaluate her for chronic hypercapnic respiratory failure. On exam she is awake and able to communicate although does not wish to talk with me much. Lungs are clear. She has lymphedema of the legs bilaterally. ABG shows a chronic resting hypercapnia 7.396/63/86/38. CXR without acute disease. It is my impression that she has SAM and obesity hypoventilation syndrome in need of PAP treatment. She previously went for a sleep study in 2018 but was very anxious and only slept for 8 minutes. I would recommend an in lab split night study with EtCO2 monitoring. She may have a sleep aid the night of the test.  We will arrange this outpatient. Okay for d/c from a pulmonary standpoint.     Electronically signed by Charmayne Roys, MD on 11/25/2020 at 11:38 AM

## 2020-11-25 NOTE — PROGRESS NOTES
Hospitalist Progress Note      SYNOPSIS: Patient admitted on 2020 with PMH of asthma/COPD with chronic respiratory failure requiring 4 L oxygen chronically; hypertension, atrial fibrillation, diabetes mellitus, bilateral lower extremity lymphedema with chronic wound who presented with burning pain on bilateral legs for the past few days. She also reports worsening fatigue, generalized weakness and not been having difficulty making it to the bathroom resulting in accidents. Patient states she had not used insulin for several days because she does not know how to use the insulin pen on which she was just started. She denies any chest pain, shortness of breath, palpitation; no fever/chill, abdominal pain, diarrhea, or dysuria. Patient is also complaining of severe \"pain in my tailbone\"; knows she has had ulcer for some time. On arrival to the emergency department, she was found to have profound hyperglycemia with glucose level at 535 but no evidence of DKA    SUBJECTIVE:  Stable overnight. No other overnight issues reported. Patient seen and examined  Records reviewed. Patient admits to headache  Answers all questions appopriately  Remains hyperglycemic  CO2 increasing  Says she has never been diagnosed with sleep apnea  Says she wears 4L NC at home      Temp (24hrs), Av.7 °F (36.5 °C), Min:97.7 °F (36.5 °C), Max:97.8 °F (36.6 °C)    DIET: DIET CARB CONTROL; Dietary Nutrition Supplements: Wound Healing Oral Supplement  CODE: Full Code    Intake/Output Summary (Last 24 hours) at 2020 0751  Last data filed at 2020 4061  Gross per 24 hour   Intake 1610 ml   Output 2500 ml   Net -890 ml       Review of Systems  All bolded are positive; please see HPI  General:  Fever, chills, diaphoresis, fatigue, malaise, night sweats, weight loss  Psychological:  Anxiety, disorientation, hallucinations. ENT:  Epistaxis, headaches, vertigo, visual changes.   Cardiovascular:  Chest pain, irregular heartbeats, palpitations, paroxysmal nocturnal dyspnea. Respiratory:  Shortness of breath, coughing, sputum production, hemoptysis, wheezing, orthopnea. Gastrointestinal:  Nausea, vomiting, diarrhea, heartburn, constipation, abdominal pain, hematemesis, hematochezia, melena, acholic stools  Genito-Urinary:  Dysuria, urgency, frequency, hematuria  Musculoskeletal:  Joint pain, joint stiffness, joint swelling, muscle pain  Neurology:  Headache, focal neurological deficits, weakness, numbness, paresthesia  Derm:  Rashes, ulcers, excoriations, bruising  Extremities:  Decreased ROM, peripheral edema, mottling      OBJECTIVE:    /64   Pulse 82   Temp 97.7 °F (36.5 °C) (Temporal)   Resp 14   Ht 5' 3\" (1.6 m)   Wt 282 lb (127.9 kg)   LMP  (LMP Unknown)   SpO2 92%   BMI 49.95 kg/m²     General appearance:  awake, alert, and oriented to person, place, time, and purpose; appears stated age and cooperative; no apparent distress no labored breathing 4L NC  HEENT:  Conjunctivae/corneas clear. Neck: Supple. No jugular venous distention. Respiratory: symmetrical; clear to auscultation bilaterally; no wheezes; no rhonchi; no rales  Cardiovascular: rhythm regular; rate controlled; no murmurs  Abdomen: Soft, nontender, nondistended  Extremities:  peripheral pulses present; no peripheral edema; +lymphedema  Musculoskeletal: No clubbing, cyanosis, no bilateral lower extremity edema. Brisk capillary refill. Skin:  No rashes  on visible skin  Neurologic: awake, alert and following commands     ASSESSMENT and PLAN:  · Uncontrolled type II diabetes mellitus -evaluated by endocrinology, adjusting insulin regimen  · Chronic venous ulcers on bilateral lower extremity associated with lymphedema  · Stage I sacral wound (POA) - causing significant pain wound care has evaluated  · Chronic respiratory failure with hypoxia clinically stable, CO2 increasing, has never been diagnosed with SAM but I do have a suspicion.  Set up for outpatient sleep study. Pulmonology has evaluated. BiPAP ordered for overnight and daytime naps. · Chronic diastolic CHF clinically stable  · Asthma/COPD  · Atrial fibrillation  · Chronic anticoagulation  · Hypertension  · Morbid obesity - Body mass index is 49.95 kg/m². · Physical deconditioning- PT/OT         DISPOSITION: Continue current plan of care.       Medications:  REVIEWED DAILY    Infusion Medications    dextrose       Scheduled Medications    insulin glargine  50 Units Subcutaneous BID    insulin lispro  35 Units Subcutaneous TID WC    insulin lispro  0-18 Units Subcutaneous TID WC    insulin lispro  0-9 Units Subcutaneous Nightly    mineral oil-hydrophilic petrolatum   Topical Daily    miconazole   Topical BID    miconazole nitrate   Topical BID    apixaban  5 mg Oral BID    [Held by provider] bumetanide  2 mg Oral BID    escitalopram  20 mg Oral Daily    ferrous sulfate  325 mg Oral Daily with breakfast    gabapentin  300 mg Oral TID    spironolactone  25 mg Oral Daily    omega-3 acid ethyl esters  2 g Oral BID    montelukast  10 mg Oral Nightly    metoprolol succinate  100 mg Oral Daily    pantoprazole  40 mg Oral QAM AC    sodium chloride flush  10 mL Intravenous 2 times per day    dilTIAZem  240 mg Oral Daily     PRN Meds: miconazole nitrate **AND** miconazole nitrate, albuterol, traZODone, ipratropium-albuterol, sodium chloride flush, acetaminophen **OR** acetaminophen, polyethylene glycol, promethazine **OR** ondansetron, glucose, dextrose, glucagon (rDNA), dextrose, bisacodyl, traMADol **OR** traMADol    Labs:     Recent Labs     11/24/20  0936 11/25/20  0601   WBC 5.8 7.0   HGB 9.9* 11.0*   HCT 33.5* 36.0    244       Recent Labs     11/24/20  0936 11/25/20  0601    137   K 4.0 3.9   CL 86* 88*   CO2 40* 42*   BUN 27* 30*   CREATININE 0.8 0.8   CALCIUM 9.4 9.9       Recent Labs     11/25/20  0601   PROT 6.9   ALKPHOS 86   ALT 15   AST 19   BILITOT 0.3       No results for input(s): INR in the last 72 hours. No results for input(s): [place] in the last 72 hours. Chronic labs:    Lab Results   Component Value Date    CHOL 287 (H) 05/26/2020    TRIG 512 (H) 05/26/2020    HDL 33 05/26/2020    LDLCALC - (AA) 05/26/2020    TSH 2.240 05/26/2020    INR 1.1 05/31/2020    LABA1C 15.7 (H) 11/17/2020       Radiology: REVIEWED DAILY    +++++++++++++++++++++++++++++++++++++++++++++++++  [address], [place]  +++++++++++++++++++++++++++++++++++++++++++++++++  NOTE: This report was transcribed using voice recognition software. Every effort was made to ensure accuracy; however, inadvertent computerized transcription errors may be present.

## 2020-11-25 NOTE — PROGRESS NOTES
ENDOCRINOLOGY PROGRESS NOTE       Date of admission: 11/17/2020  Date of service: 11/25/2020  Admitting physician: Liat Toribio DO   Primary Care Physician: Garland De La Cruz DO  Consultant physician: Mayi Pratt MD     Reason for the consultation:  Uncontrolled DM type 2     History of Present Illness:  Services were provided through a video synchronous discussion     Nidia Rodrigues is a 75 y.o. old female with PMH of DM type 2, lymphedema with chronic wounds, COPD,on Ox, HTN, Afib and other listed below admitted to [facility] on 11/17/2020 because of worsening fatigue, LE pain and found to have marked hyperglycemia, endocrine team was consulted for diabetes management. Subjective   Seen this morning, no acute issues overnight.  BG improving, appetite good     Inpatient diet:   Carb Restricted diet     Point of care glucose monitoring (Independently reviewed)   Recent Labs     11/23/20  1116 11/23/20  1612 11/23/20  2057 11/24/20  0631 11/24/20  1107 11/24/20  1556 11/24/20  2030 11/25/20  0507   GLUMET 460* 332* 256* 288* 280* 284* 195* 187*     Scheduled Meds:   insulin glargine  50 Units Subcutaneous BID    insulin lispro  35 Units Subcutaneous TID WC    insulin lispro  0-18 Units Subcutaneous TID WC    insulin lispro  0-9 Units Subcutaneous Nightly    mineral oil-hydrophilic petrolatum   Topical Daily    miconazole   Topical BID    miconazole nitrate   Topical BID    apixaban  5 mg Oral BID    [Held by provider] bumetanide  2 mg Oral BID    escitalopram  20 mg Oral Daily    ferrous sulfate  325 mg Oral Daily with breakfast    gabapentin  300 mg Oral TID    spironolactone  25 mg Oral Daily    omega-3 acid ethyl esters  2 g Oral BID    montelukast  10 mg Oral Nightly    metoprolol succinate  100 mg Oral Daily    pantoprazole  40 mg Oral QAM AC    sodium chloride flush  10 mL Intravenous 2 times per day    dilTIAZem  240 mg Oral Daily     PRN Meds:   miconazole nitrate, , TID PRN  albuterol, 2.5 mg, 4x Daily PRN  traZODone, 50 mg, Nightly PRN  ipratropium-albuterol, 1 vial, Q6H PRN  sodium chloride flush, 10 mL, PRN  acetaminophen, 650 mg, Q6H PRN    Or  acetaminophen, 650 mg, Q6H PRN  polyethylene glycol, 17 g, Daily PRN  promethazine, 12.5 mg, Q6H PRN    Or  ondansetron, 4 mg, Q6H PRN  glucose, 15 g, PRN  dextrose, 12.5 g, PRN  glucagon (rDNA), 1 mg, PRN  dextrose, 100 mL/hr, PRN  bisacodyl, 10 mg, Daily PRN  traMADol, 50 mg, Q6H PRN    Or  traMADol, 100 mg, Q6H PRN      Continuous Infusions:   dextrose         Review of Systems  All systems reviewed. All negative except for symptoms mentioned in HPI     OBJECTIVE    /64   Pulse 82   Temp 97.7 °F (36.5 °C) (Temporal)   Resp 14   Ht 5' 3\" (1.6 m)   Wt 282 lb (127.9 kg)   LMP  (LMP Unknown)   SpO2 92%   BMI 49.95 kg/m²     Intake/Output Summary (Last 24 hours) at 11/25/2020 0710  Last data filed at 11/25/2020 2492  Gross per 24 hour   Intake 1610 ml   Output 2500 ml   Net -890 ml     Physical examination:  Due to this being a TeleHealth encounter, evaluation of the following organ systems is limited: Vitals/Constitutional/EENT/Resp/CV/GI//MS/Neuro/Skin/Heme-Lymph-Imm. Modified physical exam through Telemedicine camera    General: Communicating well via camera   Neck: no obvious neck mass. No obvious neck deformity     CVS: no distress   Chest: no distress. Chest is moving with respiration    Extremities:  no visible tremor  Skin: No visible rashes as seen from camera   Musculoskeletal: no visible deformity  Neuro: Alert and oriented to person, place, and time. Psychiatric: Normal mood and affect.  Behavior is normal    Review of Laboratory Data:  I personally reviewed the following labs:   Recent Labs     11/24/20  0936 11/25/20  0601   WBC 5.8 7.0   RBC 4.05 4.39   HGB 9.9* 11.0*   HCT 33.5* 36.0   MCV 82.7 82.0   MCH 24.4* 25.1*   MCHC 29.6* 30.6*   RDW 14.1 14.2    244   MPV 9.5 9.6     Recent Labs     11/24/20  0927    K 4.0   CL 86*   CO2 40*   BUN 27*   CREATININE 0.8   GLUCOSE 354*   CALCIUM 9.4     Beta-Hydroxybutyrate   Date Value Ref Range Status   11/17/2020 1.73 (H) 0.02 - 0.27 mmol/L Final   05/29/2020 0.18 0.02 - 0.27 mmol/L Final   05/28/2020 0.04 0.02 - 0.27 mmol/L Final     Lab Results   Component Value Date    LABA1C 15.7 11/17/2020    LABA1C 9.8 08/19/2020    LABA1C 17.1 05/26/2020     Lab Results   Component Value Date/Time    TSH 2.240 05/26/2020 12:00 PM    T4FREE 1.22 05/12/2017 02:52 PM     Lab Results   Component Value Date    LABA1C 15.7 11/17/2020    GLUCOSE 354 11/24/2020    GLUCOSE 181 12/16/2011    MALBCR <30 05/16/2018    LABMICR <12.0 05/12/2017    LABCREA 11 05/26/2018     Lab Results   Component Value Date    TRIG 512 05/26/2020    HDL 33 05/26/2020    LDLCALC - 05/26/2020    CHOL 287 05/26/2020       Blood culture   Lab Results   Component Value Date    BC 5 Days no growth 08/14/2020    BC 5 Days- no growth 05/28/2020       Radiology:  XR CHEST PORTABLE   Final Result   Suboptimal inspiration. Borderline cardiomegaly. Medical Records/Labs/Images review:   I personally reviewed and summarized previous records   All labs and imaging were reviewed independently     Chris Preston, a 75 y.o.-old female seen today for inpatient diabetes management     Diabetes Mellitus type 2  · Uncontrolled, A1c 15.7%  · Will change insulin regimen to    ? lantus 50 units BID   ? Humalog 40 units with meals   ? High dose Continue HDSS   · Continue glucose check with meals and at bedtime   · Will titrate insulin dose based on the blood glucose trend & insulin requirement     Rt adrenal mass  · CT adrenal  5/30/2020 --> high Hounsfield units with negative washout makings lesion undetermined.    · Previous endocrine work up showed normal plasma metanephrines and essentially r/o Pheo. 1 mg DST was indeterminate, will need late night salivary cortisol and 24 hrs urine cortisol   · Pt on Spironolactone and for this reason Renin/Patel wasn't performed   · Pt will need adernal Biopsy (can be scheduled as an outpatient)(     Atrial fibrillation  · On cardizem and eliquis   · Managed by primary service     The above issues were reviewed with the patient who understood and agreed with the plan. Thank you for allowing us to participate in the care of this patient. Please do not hesitate to contact us with any additional questions. Melina Hodgkins MD  Endocrinologist, [facility] Diabetes Care and Endocrinology   [address] [place] [id number]   Phone: [phone]  Fax: [phone]  --------------------------------------------  An electronic signature was used to authenticate this note.  Pamela March MD on 11/25/2020 at 7:10 AM  This visit was performed as a virtual video visit using a synchronous, two-way, audio-video telehealth technology platform

## 2020-11-26 VITALS
RESPIRATION RATE: 16 BRPM | HEIGHT: 63 IN | SYSTOLIC BLOOD PRESSURE: 152 MMHG | TEMPERATURE: 97.4 F | DIASTOLIC BLOOD PRESSURE: 78 MMHG | OXYGEN SATURATION: 99 % | BODY MASS INDEX: 49.96 KG/M2 | WEIGHT: 282 LBS | HEART RATE: 86 BPM

## 2020-11-26 LAB
ALBUMIN SERPL-MCNC: 3.6 G/DL (ref 3.5–5.2)
ALP BLD-CCNC: 78 U/L (ref 35–104)
ALT SERPL-CCNC: 17 U/L (ref 0–32)
ANION GAP SERPL CALCULATED.3IONS-SCNC: 11 MMOL/L (ref 7–16)
AST SERPL-CCNC: 20 U/L (ref 0–31)
BILIRUB SERPL-MCNC: 0.3 MG/DL (ref 0–1.2)
BUN BLDV-MCNC: 30 MG/DL (ref 8–23)
CALCIUM SERPL-MCNC: 9.8 MG/DL (ref 8.6–10.2)
CHLORIDE BLD-SCNC: 89 MMOL/L (ref 98–107)
CO2: 40 MMOL/L (ref 22–29)
CREAT SERPL-MCNC: 0.7 MG/DL (ref 0.5–1)
GFR AFRICAN AMERICAN: >60
GFR NON-AFRICAN AMERICAN: >60 ML/MIN/1.73
GLUCOSE BLD-MCNC: 121 MG/DL (ref 74–99)
HCT VFR BLD CALC: 34.6 % (ref 34–48)
HEMOGLOBIN: 10.5 G/DL (ref 11.5–15.5)
MCH RBC QN AUTO: 24.9 PG (ref 26–35)
MCHC RBC AUTO-ENTMCNC: 30.3 % (ref 32–34.5)
MCV RBC AUTO: 82 FL (ref 80–99.9)
METER GLUCOSE: 203 MG/DL (ref 74–99)
METER GLUCOSE: 225 MG/DL (ref 74–99)
PDW BLD-RTO: 14.4 FL (ref 11.5–15)
PLATELET # BLD: 259 E9/L (ref 130–450)
PMV BLD AUTO: 9.8 FL (ref 7–12)
POTASSIUM SERPL-SCNC: 3.6 MMOL/L (ref 3.5–5)
RBC # BLD: 4.22 E12/L (ref 3.5–5.5)
SODIUM BLD-SCNC: 140 MMOL/L (ref 132–146)
TOTAL PROTEIN: 6.6 G/DL (ref 6.4–8.3)
WBC # BLD: 7.2 E9/L (ref 4.5–11.5)

## 2020-11-26 PROCEDURE — 80053 COMPREHEN METABOLIC PANEL: CPT

## 2020-11-26 PROCEDURE — 82962 GLUCOSE BLOOD TEST: CPT

## 2020-11-26 PROCEDURE — 85027 COMPLETE CBC AUTOMATED: CPT

## 2020-11-26 PROCEDURE — 2700000000 HC OXYGEN THERAPY PER DAY

## 2020-11-26 PROCEDURE — 99232 SBSQ HOSP IP/OBS MODERATE 35: CPT | Performed by: INTERNAL MEDICINE

## 2020-11-26 PROCEDURE — 6370000000 HC RX 637 (ALT 250 FOR IP): Performed by: INTERNAL MEDICINE

## 2020-11-26 PROCEDURE — 36415 COLL VENOUS BLD VENIPUNCTURE: CPT

## 2020-11-26 PROCEDURE — 6370000000 HC RX 637 (ALT 250 FOR IP): Performed by: HOSPITALIST

## 2020-11-26 RX ORDER — POLYETHYLENE GLYCOL 3350 17 G/17G
17 POWDER, FOR SOLUTION ORAL DAILY PRN
Qty: 527 G | Refills: 0 | Status: SHIPPED | OUTPATIENT
Start: 2020-11-26 | End: 2020-12-26

## 2020-11-26 RX ORDER — DILTIAZEM HYDROCHLORIDE 240 MG/1
240 CAPSULE, COATED, EXTENDED RELEASE ORAL DAILY
Qty: 30 CAPSULE | Refills: 0 | Status: SHIPPED | OUTPATIENT
Start: 2020-11-27 | End: 2021-04-30

## 2020-11-26 RX ORDER — DILTIAZEM HYDROCHLORIDE 240 MG/1
240 CAPSULE, COATED, EXTENDED RELEASE ORAL DAILY
Qty: 30 CAPSULE | Refills: 0 | Status: SHIPPED | OUTPATIENT
Start: 2020-11-27 | End: 2020-11-26

## 2020-11-26 RX ORDER — INSULIN GLARGINE 100 [IU]/ML
50 INJECTION, SOLUTION SUBCUTANEOUS 2 TIMES DAILY
Qty: 1 VIAL | Refills: 0 | Status: ON HOLD | OUTPATIENT
Start: 2020-11-26 | End: 2021-01-19 | Stop reason: HOSPADM

## 2020-11-26 RX ORDER — ESCITALOPRAM OXALATE 20 MG/1
20 TABLET ORAL DAILY
Qty: 30 TABLET | Refills: 0 | Status: SHIPPED | OUTPATIENT
Start: 2020-11-27 | End: 2020-11-26

## 2020-11-26 RX ORDER — PETROLATUM 42 G/100G
OINTMENT TOPICAL
Qty: 1 TUBE | Refills: 0 | Status: SHIPPED | OUTPATIENT
Start: 2020-11-27 | End: 2020-11-26

## 2020-11-26 RX ORDER — ESCITALOPRAM OXALATE 20 MG/1
20 TABLET ORAL DAILY
Qty: 30 TABLET | Refills: 0 | Status: SHIPPED | OUTPATIENT
Start: 2020-11-27 | End: 2022-03-08 | Stop reason: ALTCHOICE

## 2020-11-26 RX ORDER — PETROLATUM 42 G/100G
OINTMENT TOPICAL
Qty: 1 TUBE | Refills: 0 | Status: SHIPPED | OUTPATIENT
Start: 2020-11-27 | End: 2021-01-13

## 2020-11-26 RX ORDER — POLYETHYLENE GLYCOL 3350 17 G/17G
17 POWDER, FOR SOLUTION ORAL DAILY PRN
Qty: 527 G | Refills: 0 | Status: SHIPPED | OUTPATIENT
Start: 2020-11-26 | End: 2020-11-26

## 2020-11-26 RX ORDER — INSULIN GLARGINE 100 [IU]/ML
50 INJECTION, SOLUTION SUBCUTANEOUS 2 TIMES DAILY
Qty: 1 VIAL | Refills: 0 | Status: SHIPPED | OUTPATIENT
Start: 2020-11-26 | End: 2020-11-26

## 2020-11-26 RX ADMIN — Medication: at 08:14

## 2020-11-26 RX ADMIN — BUMETANIDE 2 MG: 1 TABLET ORAL at 08:10

## 2020-11-26 RX ADMIN — DILTIAZEM HYDROCHLORIDE 240 MG: 240 CAPSULE, EXTENDED RELEASE ORAL at 08:11

## 2020-11-26 RX ADMIN — INSULIN LISPRO 6 UNITS: 100 INJECTION, SOLUTION INTRAVENOUS; SUBCUTANEOUS at 12:35

## 2020-11-26 RX ADMIN — INSULIN GLARGINE 50 UNITS: 100 INJECTION, SOLUTION SUBCUTANEOUS at 08:15

## 2020-11-26 RX ADMIN — PETROLATUM: 42 OINTMENT TOPICAL at 08:14

## 2020-11-26 RX ADMIN — FERROUS SULFATE TAB 325 MG (65 MG ELEMENTAL FE) 325 MG: 325 (65 FE) TAB at 08:10

## 2020-11-26 RX ADMIN — INSULIN LISPRO 40 UNITS: 100 INJECTION, SOLUTION INTRAVENOUS; SUBCUTANEOUS at 08:16

## 2020-11-26 RX ADMIN — SPIRONOLACTONE 25 MG: 25 TABLET ORAL at 08:10

## 2020-11-26 RX ADMIN — GABAPENTIN 300 MG: 300 CAPSULE ORAL at 08:10

## 2020-11-26 RX ADMIN — INSULIN LISPRO 6 UNITS: 100 INJECTION, SOLUTION INTRAVENOUS; SUBCUTANEOUS at 08:19

## 2020-11-26 RX ADMIN — TRAMADOL HYDROCHLORIDE 100 MG: 50 TABLET ORAL at 06:49

## 2020-11-26 RX ADMIN — OMEGA-3-ACID ETHYL ESTERS 2 G: 1 CAPSULE, LIQUID FILLED ORAL at 08:11

## 2020-11-26 RX ADMIN — METOPROLOL SUCCINATE 100 MG: 50 TABLET, EXTENDED RELEASE ORAL at 08:10

## 2020-11-26 RX ADMIN — ACETAMINOPHEN 650 MG: 325 TABLET ORAL at 08:10

## 2020-11-26 RX ADMIN — ESCITALOPRAM 20 MG: 10 TABLET, FILM COATED ORAL at 08:11

## 2020-11-26 RX ADMIN — APIXABAN 5 MG: 5 TABLET, FILM COATED ORAL at 08:10

## 2020-11-26 RX ADMIN — PANTOPRAZOLE SODIUM 40 MG: 40 TABLET, DELAYED RELEASE ORAL at 06:50

## 2020-11-26 RX ADMIN — INSULIN LISPRO 40 UNITS: 100 INJECTION, SOLUTION INTRAVENOUS; SUBCUTANEOUS at 12:34

## 2020-11-26 RX ADMIN — MICONAZOLE NITRATE: 20.6 POWDER TOPICAL at 08:15

## 2020-11-26 ASSESSMENT — PAIN DESCRIPTION - ORIENTATION: ORIENTATION: RIGHT

## 2020-11-26 ASSESSMENT — PAIN SCALES - GENERAL
PAINLEVEL_OUTOF10: 5
PAINLEVEL_OUTOF10: 5
PAINLEVEL_OUTOF10: 6

## 2020-11-26 ASSESSMENT — PAIN DESCRIPTION - ONSET: ONSET: ON-GOING

## 2020-11-26 ASSESSMENT — PAIN DESCRIPTION - DESCRIPTORS: DESCRIPTORS: ACHING;CONSTANT;DISCOMFORT

## 2020-11-26 ASSESSMENT — PAIN DESCRIPTION - PAIN TYPE: TYPE: ACUTE PAIN

## 2020-11-26 ASSESSMENT — PAIN DESCRIPTION - PROGRESSION: CLINICAL_PROGRESSION: NOT CHANGED

## 2020-11-26 ASSESSMENT — PAIN - FUNCTIONAL ASSESSMENT: PAIN_FUNCTIONAL_ASSESSMENT: PREVENTS OR INTERFERES SOME ACTIVE ACTIVITIES AND ADLS

## 2020-11-26 ASSESSMENT — PAIN DESCRIPTION - LOCATION: LOCATION: LEG

## 2020-11-26 ASSESSMENT — PAIN DESCRIPTION - FREQUENCY: FREQUENCY: CONTINUOUS

## 2020-11-26 NOTE — PROGRESS NOTES
ENDOCRINOLOGY PROGRESS NOTE       Date of admission: 11/17/2020  Date of service: 11/26/2020  Admitting physician: Idalmis Schwartz DO   Primary Care Physician: Zuleika Moore DO  Consultant physician: Medhat Mccurdy MD     Reason for the consultation:  Uncontrolled DM type 2     History of Present Illness:  Services were provided through a video synchronous discussion     Miryam Tipton is a 75 y.o. old female with PMH of DM type 2, lymphedema with chronic wounds, COPD,on Ox, HTN, Afib and other listed below admitted to [facility] on 11/17/2020 because of worsening fatigue, LE pain and found to have marked hyperglycemia, endocrine team was consulted for diabetes management. Subjective   Seen and examined this morning, feels good, no acute issues.  Glucose improving, appetite good     Inpatient diet:   Carb Restricted diet     Point of care glucose monitoring (Independently reviewed)   Recent Labs     11/24/20  1107 11/24/20  1556 11/24/20  2030 11/25/20  0507 11/25/20  1043 11/25/20  1611 11/25/20  2150 11/26/20  0647   GLUMET 280* 284* 195* 187* 322* 207* 171* 203*     Scheduled Meds:   insulin lispro  40 Units Subcutaneous TID WC    insulin glargine  50 Units Subcutaneous BID    insulin lispro  0-18 Units Subcutaneous TID WC    insulin lispro  0-9 Units Subcutaneous Nightly    mineral oil-hydrophilic petrolatum   Topical Daily    miconazole   Topical BID    miconazole nitrate   Topical BID    apixaban  5 mg Oral BID    bumetanide  2 mg Oral BID    escitalopram  20 mg Oral Daily    ferrous sulfate  325 mg Oral Daily with breakfast    gabapentin  300 mg Oral TID    spironolactone  25 mg Oral Daily    omega-3 acid ethyl esters  2 g Oral BID    montelukast  10 mg Oral Nightly    metoprolol succinate  100 mg Oral Daily    pantoprazole  40 mg Oral QAM AC    sodium chloride flush  10 mL Intravenous 2 times per day    dilTIAZem  240 mg Oral Daily     PRN Meds:   miconazole nitrate, , TID PRN  albuterol, 2.5 mg, 4x Daily PRN  traZODone, 50 mg, Nightly PRN  ipratropium-albuterol, 1 vial, Q6H PRN  sodium chloride flush, 10 mL, PRN  acetaminophen, 650 mg, Q6H PRN    Or  acetaminophen, 650 mg, Q6H PRN  polyethylene glycol, 17 g, Daily PRN  promethazine, 12.5 mg, Q6H PRN    Or  ondansetron, 4 mg, Q6H PRN  glucose, 15 g, PRN  dextrose, 12.5 g, PRN  glucagon (rDNA), 1 mg, PRN  dextrose, 100 mL/hr, PRN  bisacodyl, 10 mg, Daily PRN  traMADol, 50 mg, Q6H PRN    Or  traMADol, 100 mg, Q6H PRN      Continuous Infusions:   dextrose         Review of Systems  All systems reviewed. All negative except for symptoms mentioned in HPI     OBJECTIVE    BP (!) 152/78   Pulse 86   Temp 97.4 °F (36.3 °C) (Temporal)   Resp 16   Ht 5' 3\" (1.6 m)   Wt 282 lb (127.9 kg)   LMP  (LMP Unknown)   SpO2 99%   BMI 49.95 kg/m²     Intake/Output Summary (Last 24 hours) at 11/26/2020 1152  Last data filed at 11/26/2020 0716  Gross per 24 hour   Intake 240 ml   Output 3250 ml   Net -3010 ml     Physical examination:  General: awake alert, oriented x3, no abnormal position or movements. Morbidly obese   HEENT: normocephalic non traumatic, no exophthalmos   Neck: supple, no LN enlargement, no thyroid tenderness   Pulm: good equal air entry    CVS: S1 + S2   Abd: soft lax, no tenderness,  audible bowel sounds. Skin: warm, no lesions, no rash.  no Ulcers   Neuro: CN intact, sensation decreased bilateral   Psych: normal mood, and affect    Review of Laboratory Data:  I personally reviewed the following labs:   Recent Labs     11/24/20  0936 11/25/20  0601 11/26/20  0506   WBC 5.8 7.0 7.2   RBC 4.05 4.39 4.22   HGB 9.9* 11.0* 10.5*   HCT 33.5* 36.0 34.6   MCV 82.7 82.0 82.0   MCH 24.4* 25.1* 24.9*   MCHC 29.6* 30.6* 30.3*   RDW 14.1 14.2 14.4    244 259   MPV 9.5 9.6 9.8     Recent Labs     11/24/20  0936 11/25/20  0601 11/26/20  0506    137 140   K 4.0 3.9 3.6   CL 86* 88* 89*   CO2 40* 42* 40*   BUN 27* 30* 30*   CREATININE 0.8 0.8 0.7 GLUCOSE 354* 220* 121*   CALCIUM 9.4 9.9 9.8   PROT  --  6.9 6.6   LABALBU  --  3.5 3.6   BILITOT  --  0.3 0.3   ALKPHOS  --  86 78   AST  --  19 20   ALT  --  15 17     Beta-Hydroxybutyrate   Date Value Ref Range Status   11/17/2020 1.73 (H) 0.02 - 0.27 mmol/L Final   05/29/2020 0.18 0.02 - 0.27 mmol/L Final   05/28/2020 0.04 0.02 - 0.27 mmol/L Final     Lab Results   Component Value Date    LABA1C 15.7 11/17/2020    LABA1C 9.8 08/19/2020    LABA1C 17.1 05/26/2020     Lab Results   Component Value Date/Time    TSH 2.240 05/26/2020 12:00 PM    T4FREE 1.22 05/12/2017 02:52 PM     Lab Results   Component Value Date    LABA1C 15.7 11/17/2020    GLUCOSE 121 11/26/2020    GLUCOSE 181 12/16/2011    MALBCR <30 05/16/2018    LABMICR <12.0 05/12/2017    LABCREA 11 05/26/2018     Lab Results   Component Value Date    TRIG 512 05/26/2020    HDL 33 05/26/2020    LDLCALC - 05/26/2020    CHOL 287 05/26/2020       Blood culture   Lab Results   Component Value Date    BC 5 Days no growth 08/14/2020    BC 5 Days- no growth 05/28/2020       Radiology:  XR CHEST PORTABLE   Final Result   Suboptimal inspiration. Borderline cardiomegaly. Medical Records/Labs/Images review:   I personally reviewed and summarized previous records   All labs and imaging were reviewed independently     Chris Preston, a 75 y.o.-old female seen today for inpatient diabetes management     Diabetes Mellitus type 2  · Uncontrolled, A1c 15.7%  · Continue current DM regimen   ? lantus 50 units BID   ? Humalog 40 units with meals   ? High dose sliding scale  · Continue glucose check with meals and at bedtime   · Will titrate insulin dose based on the blood glucose trend & insulin requirement  · On discharge, we recommend dc pt on the regimen above   · Pt will follow with us after discharge.  Endocrine follow up visit, Wednesday 12/23 at 11:00AM      Rt adrenal mass  · CT adrenal  5/30/2020 --> high Hounsfield units with negative washout makings lesion undetermined. · Previous endocrine work up showed normal plasma metanephrines (essentially r/o  Pheo). 1 mg DST was indeterminate, will need late night salivary cortisol and 24 hrs urine cortisol   · Pt on Spironolactone and for this reason Renin/Patel wasn't performed   · Pt will need adernal Biopsy (can be scheduled as an outpatient)(     Atrial fibrillation  · On cardizem and eliquis   · Managed by primary service     The above issues were reviewed with the patient who understood and agreed with the plan. Thank you for allowing us to participate in the care of this patient. Please do not hesitate to contact us with any additional questions. Jessica Conde MD  Endocrinologist, [facility] [place] and Endocrinology   [address] [address]   Phone: [phone]  Fax: [phone]  --------------------------------------------  An electronic signature was used to authenticate this note.  Zenaida Ballard MD on 11/26/2020 at 11:52 AM

## 2020-11-26 NOTE — PLAN OF CARE
Problem: Pain:  Goal: Control of acute pain  Description: Control of acute pain  Outcome: Met This Shift     Problem: Falls - Risk of:  Goal: Will remain free from falls  Description: Will remain free from falls  Outcome: Met This Shift     Problem: Skin Integrity:  Goal: Will show no infection signs and symptoms  Description: Will show no infection signs and symptoms  Outcome: Ongoing  Goal: Absence of new skin breakdown  Description: Absence of new skin breakdown  Outcome: Ongoing

## 2020-11-26 NOTE — DISCHARGE SUMMARY
Discharge Summary    Admit date: 11/17/2020    Discharge date and time: No discharge date for patient encounter. Admitting Physician: Shakira Hunt DO     Consultants: Endocrinology, Pulmonology    Admission Diagnoses:  Hyperglycemia    Discharge Diagnoses and hospital course:   · Uncontrolled type II diabetes mellitus - Will be DC with Lantus 50 units BID along with humalog 40 units with meals +high dose sliding scale. Will need follow up with endocrinology. · Chronic venous ulcers on bilateral lower extremity associated with lymphedema  · Stage I sacral wound (POA) - Wound care followed  · Chronic respiratory failure with hypoxia clinically stable. Has never been diagnosed with SAM but I do have a suspicion. Pulmonology has evaluated. Pulmonology recommends an in lab split night study with EtCO2 monitoring. She may have a sleep aid the night of the test.  Pulmonology will arrange this outpatient. Chronically wears 4L at home, this will be decreased to 2L. She had oxygen tank delivered to hospital.  · Chronic diastolic CHF clinically stable  · Asthma/COPD  · Atrial fibrillation  · Chronic anticoagulation  · Hypertension  · Morbid obesity - Body mass index is 49.95 kg/m².   · Adrenal mass- CT adrenal  5/30/2020 --> high Hounsfield units with negative washout makings lesion undetermined. Previous endocrine work up showed normal plasma metanephrines and appropriate response to 1 mg DST (essentially r/o Cushing's and Pheo). Pt on Spironolactone and for this reason Renin/Patel wasn't performed.  Pt will need adernal Biopsy (can be scheduled as an outpatient)  · Physical deconditioning-      Discharge Exam:  Vitals:    11/26/20 0715   BP: (!) 152/78   Pulse: 86   Resp: 16   Temp: 97.4 °F (36.3 °C)   SpO2: 99%       General appearance:  awake, alert, and oriented to person, place, time, and purpose; appears stated age and cooperative; no apparent distress no labored breathing 2L NC  HEENT:  Conjunctivae/corneas petrolatum ointment  Apply topically as needed. Start taking on:  November 27, 2020     polyethylene glycol 17 g packet  Commonly known as:  GLYCOLAX  Take 17 g by mouth daily as needed for Constipation         * This list has 3 medication(s) that are the same as other medications prescribed for you. Read the directions carefully, and ask your doctor or other care provider to review them with you. CHANGE how you take these medications    escitalopram 20 MG tablet  Commonly known as:  LEXAPRO  Take 1 tablet by mouth daily  Start taking on:  November 27, 2020  What changed:  when to take this     insulin glargine 100 UNIT/ML injection vial  Commonly known as:  LANTUS  Inject 50 Units into the skin 2 times daily  What changed:  how much to take     * insulin lispro 100 UNIT/ML injection vial  Commonly known as:  HUMALOG  Inject 0-18 Units into the skin 3 times daily (with meals)  What changed:    · how much to take  · when to take this     * insulin lispro 100 UNIT/ML injection vial  Commonly known as:  HUMALOG  Inject 0-9 Units into the skin nightly  What changed: You were already taking a medication with the same name, and this prescription was added. Make sure you understand how and when to take each. * insulin lispro 100 UNIT/ML injection vial  Commonly known as:  HUMALOG  Inject 40 Units into the skin 3 times daily (with meals)  What changed: You were already taking a medication with the same name, and this prescription was added. Make sure you understand how and when to take each. OXYGEN  Inhale 2 L into the lungs continuous  What changed:  how much to take         * This list has 3 medication(s) that are the same as other medications prescribed for you. Read the directions carefully, and ask your doctor or other care provider to review them with you.             CONTINUE taking these medications    acetaminophen 325 MG tablet  Commonly known as:  TYLENOL     albuterol sulfate  (90 Base) MCG/ACT inhaler  Inhale 2 puffs into the lungs 4 times daily as needed for Wheezing     apixaban 5 MG Tabs tablet  Commonly known as:  ELIQUIS     Bumex 2 MG tablet  Generic drug:  bumetanide     ferrous sulfate 325 (65 Fe) MG tablet  Commonly known as:  IRON 325  Take 1 tablet by mouth daily (with breakfast)     gabapentin 300 MG capsule  Commonly known as:  NEURONTIN     ipratropium-albuterol 0.5-2.5 (3) MG/3ML Soln nebulizer solution  Commonly known as:  DUONEB  Take 3 mLs by nebulization every 6 hours as needed for Shortness of Breath     Magic Mouthwash  Commonly known as:  Miracle Mouthwash  Swish and spit 5 mLs 4 times daily as needed for Irritation     metoprolol succinate 100 MG extended release tablet  Commonly known as:  TOPROL XL  Take 1 tablet by mouth daily     montelukast 10 MG tablet  Commonly known as:  SINGULAIR  Take 1 tablet by mouth nightly     omega-3 acid ethyl esters 1 g capsule  Commonly known as:  LOVAZA  Take 2 capsules by mouth 2 times daily     Prevacid 30 MG delayed release capsule  Generic drug:  lansoprazole     spironolactone 25 MG tablet  Commonly known as:  ALDACTONE  Take 1 tablet by mouth daily     traZODone 50 MG tablet  Commonly known as:  DESYREL     vitamin D 1.25 MG (01714 UT) Caps capsule  Commonly known as:  ERGOCALCIFEROL        STOP taking these medications    dilTIAZem 240 MG extended release capsule  Commonly known as:  DILACOR XR     nystatin 601325 UNIT/GM powder  Commonly known as:  MYCOSTATIN           Where to Get Your Medications      You can get these medications from any pharmacy    Bring a paper prescription for each of these medications  · dilTIAZem 240 MG extended release capsule  · escitalopram 20 MG tablet  · insulin glargine 100 UNIT/ML injection vial  · insulin lispro 100 UNIT/ML injection vial  · insulin lispro 100 UNIT/ML injection vial  · insulin lispro 100 UNIT/ML injection vial  · miconazole 2 % powder  · miconazole nitrate 2 %

## 2020-11-26 NOTE — PROGRESS NOTES
Discharge instructions given. All questions answered. Discharge instructions and [address] faxed to [facility] at [phone]. Placed call to [facility] transportation for ride. Awaiting on ride.

## 2021-01-10 RX ORDER — MONTELUKAST SODIUM 10 MG/1
TABLET ORAL
Qty: 90 TABLET | Refills: 1 | OUTPATIENT
Start: 2021-01-10

## 2021-01-10 RX ORDER — METOPROLOL SUCCINATE 100 MG/1
TABLET, EXTENDED RELEASE ORAL
Qty: 90 TABLET | Refills: 1 | OUTPATIENT
Start: 2021-01-10

## 2021-01-10 RX ORDER — DILTIAZEM HYDROCHLORIDE 240 MG/1
CAPSULE, COATED, EXTENDED RELEASE ORAL
Qty: 180 CAPSULE | OUTPATIENT
Start: 2021-01-10

## 2021-01-10 RX ORDER — TRAZODONE HYDROCHLORIDE 50 MG/1
TABLET ORAL
Qty: 90 TABLET | Refills: 1 | OUTPATIENT
Start: 2021-01-10

## 2021-01-10 RX ORDER — BUMETANIDE 1 MG/1
TABLET ORAL
Qty: 180 TABLET | Refills: 1 | OUTPATIENT
Start: 2021-01-10

## 2021-01-10 RX ORDER — BUMETANIDE 2 MG/1
TABLET ORAL
Qty: 180 TABLET | OUTPATIENT
Start: 2021-01-10

## 2021-01-10 RX ORDER — ESCITALOPRAM OXALATE 20 MG/1
TABLET ORAL
Qty: 90 TABLET | Refills: 1 | OUTPATIENT
Start: 2021-01-10

## 2021-01-12 ENCOUNTER — APPOINTMENT (OUTPATIENT)
Dept: GENERAL RADIOLOGY | Age: 66
DRG: 140 | End: 2021-01-12
Payer: COMMERCIAL

## 2021-01-12 ENCOUNTER — HOSPITAL ENCOUNTER (INPATIENT)
Age: 66
LOS: 6 days | Discharge: HOME HEALTH | DRG: 140 | End: 2021-01-19
Attending: EMERGENCY MEDICINE | Admitting: FAMILY MEDICINE
Payer: COMMERCIAL

## 2021-01-12 DIAGNOSIS — R09.02 HYPOXIA: ICD-10-CM

## 2021-01-12 DIAGNOSIS — J44.1 COPD EXACERBATION (HCC): Primary | ICD-10-CM

## 2021-01-12 LAB
APTT: 31.4 SEC (ref 24.5–35.1)
BASOPHILS ABSOLUTE: 0.05 E9/L (ref 0–0.2)
BASOPHILS RELATIVE PERCENT: 0.6 % (ref 0–2)
EOSINOPHILS ABSOLUTE: 0.16 E9/L (ref 0.05–0.5)
EOSINOPHILS RELATIVE PERCENT: 2 % (ref 0–6)
HCT VFR BLD CALC: 32.7 % (ref 34–48)
HEMOGLOBIN: 10 G/DL (ref 11.5–15.5)
IMMATURE GRANULOCYTES #: 0.05 E9/L
IMMATURE GRANULOCYTES %: 0.6 % (ref 0–5)
INR BLD: 1.1
LACTIC ACID: 2.5 MMOL/L (ref 0.5–2.2)
LYMPHOCYTES ABSOLUTE: 2.07 E9/L (ref 1.5–4)
LYMPHOCYTES RELATIVE PERCENT: 26.2 % (ref 20–42)
MCH RBC QN AUTO: 25.7 PG (ref 26–35)
MCHC RBC AUTO-ENTMCNC: 30.6 % (ref 32–34.5)
MCV RBC AUTO: 84.1 FL (ref 80–99.9)
MONOCYTES ABSOLUTE: 0.65 E9/L (ref 0.1–0.95)
MONOCYTES RELATIVE PERCENT: 8.2 % (ref 2–12)
NEUTROPHILS ABSOLUTE: 4.93 E9/L (ref 1.8–7.3)
NEUTROPHILS RELATIVE PERCENT: 62.4 % (ref 43–80)
PDW BLD-RTO: 15.7 FL (ref 11.5–15)
PLATELET # BLD: 241 E9/L (ref 130–450)
PMV BLD AUTO: 9.9 FL (ref 7–12)
PROTHROMBIN TIME: 12.4 SEC (ref 9.3–12.4)
RBC # BLD: 3.89 E12/L (ref 3.5–5.5)
SARS-COV-2, NAAT: NOT DETECTED
TROPONIN: <0.01 NG/ML (ref 0–0.03)
WBC # BLD: 7.9 E9/L (ref 4.5–11.5)

## 2021-01-12 PROCEDURE — 85730 THROMBOPLASTIN TIME PARTIAL: CPT

## 2021-01-12 PROCEDURE — U0002 COVID-19 LAB TEST NON-CDC: HCPCS

## 2021-01-12 PROCEDURE — 85025 COMPLETE CBC W/AUTO DIFF WBC: CPT

## 2021-01-12 PROCEDURE — 96365 THER/PROPH/DIAG IV INF INIT: CPT

## 2021-01-12 PROCEDURE — 85610 PROTHROMBIN TIME: CPT

## 2021-01-12 PROCEDURE — 99285 EMERGENCY DEPT VISIT HI MDM: CPT

## 2021-01-12 PROCEDURE — 71045 X-RAY EXAM CHEST 1 VIEW: CPT

## 2021-01-12 PROCEDURE — 84484 ASSAY OF TROPONIN QUANT: CPT

## 2021-01-12 PROCEDURE — 80053 COMPREHEN METABOLIC PANEL: CPT

## 2021-01-12 PROCEDURE — 83605 ASSAY OF LACTIC ACID: CPT

## 2021-01-12 PROCEDURE — 36415 COLL VENOUS BLD VENIPUNCTURE: CPT

## 2021-01-12 PROCEDURE — 93005 ELECTROCARDIOGRAM TRACING: CPT | Performed by: EMERGENCY MEDICINE

## 2021-01-12 PROCEDURE — 94664 DEMO&/EVAL PT USE INHALER: CPT

## 2021-01-12 PROCEDURE — 96375 TX/PRO/DX INJ NEW DRUG ADDON: CPT

## 2021-01-12 PROCEDURE — 6360000002 HC RX W HCPCS: Performed by: EMERGENCY MEDICINE

## 2021-01-12 PROCEDURE — 6370000000 HC RX 637 (ALT 250 FOR IP): Performed by: EMERGENCY MEDICINE

## 2021-01-12 RX ORDER — IPRATROPIUM BROMIDE AND ALBUTEROL SULFATE 2.5; .5 MG/3ML; MG/3ML
1 SOLUTION RESPIRATORY (INHALATION) ONCE
Status: COMPLETED | OUTPATIENT
Start: 2021-01-12 | End: 2021-01-12

## 2021-01-12 RX ORDER — METHYLPREDNISOLONE SODIUM SUCCINATE 125 MG/2ML
125 INJECTION, POWDER, LYOPHILIZED, FOR SOLUTION INTRAMUSCULAR; INTRAVENOUS ONCE
Status: COMPLETED | OUTPATIENT
Start: 2021-01-12 | End: 2021-01-12

## 2021-01-12 RX ORDER — MAGNESIUM SULFATE IN WATER 40 MG/ML
2 INJECTION, SOLUTION INTRAVENOUS ONCE
Status: COMPLETED | OUTPATIENT
Start: 2021-01-12 | End: 2021-01-13

## 2021-01-12 RX ADMIN — IPRATROPIUM BROMIDE AND ALBUTEROL SULFATE 1 AMPULE: .5; 2.5 SOLUTION RESPIRATORY (INHALATION) at 23:28

## 2021-01-12 RX ADMIN — METHYLPREDNISOLONE SODIUM SUCCINATE 125 MG: 125 INJECTION, POWDER, FOR SOLUTION INTRAMUSCULAR; INTRAVENOUS at 23:26

## 2021-01-12 RX ADMIN — MAGNESIUM SULFATE HEPTAHYDRATE 2 G: 40 INJECTION, SOLUTION INTRAVENOUS at 23:26

## 2021-01-13 PROBLEM — I48.91 ATRIAL FIBRILLATION WITH RVR (HCC): Status: ACTIVE | Noted: 2021-01-13

## 2021-01-13 LAB
ALBUMIN SERPL-MCNC: 4 G/DL (ref 3.5–5.2)
ALP BLD-CCNC: 94 U/L (ref 35–104)
ALT SERPL-CCNC: 16 U/L (ref 0–32)
ANION GAP SERPL CALCULATED.3IONS-SCNC: 10 MMOL/L (ref 7–16)
AST SERPL-CCNC: 13 U/L (ref 0–31)
BILIRUB SERPL-MCNC: <0.2 MG/DL (ref 0–1.2)
BUN BLDV-MCNC: 20 MG/DL (ref 8–23)
CALCIUM SERPL-MCNC: 8.9 MG/DL (ref 8.6–10.2)
CHLORIDE BLD-SCNC: 97 MMOL/L (ref 98–107)
CHP ED QC CHECK: YES
CK MB: 2.7 NG/ML (ref 0–4.3)
CO2: 31 MMOL/L (ref 22–29)
CREAT SERPL-MCNC: 0.8 MG/DL (ref 0.5–1)
EKG P-R INTERVAL: 200 MS
EKG Q-T INTERVAL: 288 MS
EKG QRS DURATION: 90 MS
EKG QTC CALCULATION (BAZETT): 430 MS
EKG R AXIS: 156 DEGREES
EKG T AXIS: 38 DEGREES
EKG VENTRICULAR RATE: 134 BPM
GFR AFRICAN AMERICAN: >60
GFR NON-AFRICAN AMERICAN: >60 ML/MIN/1.73
GLUCOSE BLD-MCNC: 234 MG/DL (ref 74–99)
GLUCOSE BLD-MCNC: 445 MG/DL
HBA1C MFR BLD: 10.2 % (ref 4–5.6)
LACTIC ACID: 4.7 MMOL/L (ref 0.5–2.2)
METER GLUCOSE: 361 MG/DL (ref 74–99)
METER GLUCOSE: 373 MG/DL (ref 74–99)
METER GLUCOSE: 431 MG/DL (ref 74–99)
METER GLUCOSE: 445 MG/DL (ref 74–99)
POTASSIUM REFLEX MAGNESIUM: 4.5 MMOL/L (ref 3.5–5)
PRO-BNP: 1543 PG/ML (ref 0–125)
PROCALCITONIN: 0.12 NG/ML (ref 0–0.08)
SODIUM BLD-SCNC: 138 MMOL/L (ref 132–146)
TOTAL CK: 85 U/L (ref 20–180)
TOTAL PROTEIN: 6.6 G/DL (ref 6.4–8.3)
TROPONIN: <0.01 NG/ML (ref 0–0.03)

## 2021-01-13 PROCEDURE — 6370000000 HC RX 637 (ALT 250 FOR IP)

## 2021-01-13 PROCEDURE — 84145 PROCALCITONIN (PCT): CPT

## 2021-01-13 PROCEDURE — 6360000002 HC RX W HCPCS: Performed by: FAMILY MEDICINE

## 2021-01-13 PROCEDURE — 2500000003 HC RX 250 WO HCPCS: Performed by: FAMILY MEDICINE

## 2021-01-13 PROCEDURE — 83880 ASSAY OF NATRIURETIC PEPTIDE: CPT

## 2021-01-13 PROCEDURE — 6370000000 HC RX 637 (ALT 250 FOR IP): Performed by: FAMILY MEDICINE

## 2021-01-13 PROCEDURE — 6370000000 HC RX 637 (ALT 250 FOR IP): Performed by: EMERGENCY MEDICINE

## 2021-01-13 PROCEDURE — 83605 ASSAY OF LACTIC ACID: CPT

## 2021-01-13 PROCEDURE — 94640 AIRWAY INHALATION TREATMENT: CPT

## 2021-01-13 PROCEDURE — 82550 ASSAY OF CK (CPK): CPT

## 2021-01-13 PROCEDURE — 6370000000 HC RX 637 (ALT 250 FOR IP): Performed by: INTERNAL MEDICINE

## 2021-01-13 PROCEDURE — 84484 ASSAY OF TROPONIN QUANT: CPT

## 2021-01-13 PROCEDURE — 82962 GLUCOSE BLOOD TEST: CPT

## 2021-01-13 PROCEDURE — 2060000000 HC ICU INTERMEDIATE R&B

## 2021-01-13 PROCEDURE — 6360000002 HC RX W HCPCS: Performed by: INTERNAL MEDICINE

## 2021-01-13 PROCEDURE — 82553 CREATINE MB FRACTION: CPT

## 2021-01-13 PROCEDURE — 83036 HEMOGLOBIN GLYCOSYLATED A1C: CPT

## 2021-01-13 PROCEDURE — 96366 THER/PROPH/DIAG IV INF ADDON: CPT

## 2021-01-13 PROCEDURE — 36415 COLL VENOUS BLD VENIPUNCTURE: CPT

## 2021-01-13 RX ORDER — ACETAMINOPHEN 325 MG/1
650 TABLET ORAL EVERY 4 HOURS PRN
Status: DISCONTINUED | OUTPATIENT
Start: 2021-01-13 | End: 2021-01-19 | Stop reason: HOSPADM

## 2021-01-13 RX ORDER — DEXTROSE MONOHYDRATE 50 MG/ML
100 INJECTION, SOLUTION INTRAVENOUS PRN
Status: DISCONTINUED | OUTPATIENT
Start: 2021-01-13 | End: 2021-01-13 | Stop reason: SDUPTHER

## 2021-01-13 RX ORDER — DEXTROSE MONOHYDRATE 25 G/50ML
12.5 INJECTION, SOLUTION INTRAVENOUS PRN
Status: DISCONTINUED | OUTPATIENT
Start: 2021-01-13 | End: 2021-01-13 | Stop reason: SDUPTHER

## 2021-01-13 RX ORDER — NICOTINE POLACRILEX 4 MG
15 LOZENGE BUCCAL PRN
Status: DISCONTINUED | OUTPATIENT
Start: 2021-01-13 | End: 2021-01-13 | Stop reason: SDUPTHER

## 2021-01-13 RX ORDER — DILTIAZEM HYDROCHLORIDE 240 MG/1
240 CAPSULE, COATED, EXTENDED RELEASE ORAL DAILY
Status: DISCONTINUED | OUTPATIENT
Start: 2021-01-13 | End: 2021-01-19 | Stop reason: HOSPADM

## 2021-01-13 RX ORDER — ERGOCALCIFEROL 1.25 MG/1
50000 CAPSULE ORAL WEEKLY
Status: DISCONTINUED | OUTPATIENT
Start: 2021-01-18 | End: 2021-01-19 | Stop reason: HOSPADM

## 2021-01-13 RX ORDER — ESCITALOPRAM OXALATE 10 MG/1
20 TABLET ORAL DAILY
Status: DISCONTINUED | OUTPATIENT
Start: 2021-01-13 | End: 2021-01-19 | Stop reason: HOSPADM

## 2021-01-13 RX ORDER — LORAZEPAM 0.5 MG/1
0.5 TABLET ORAL 2 TIMES DAILY PRN
Status: DISCONTINUED | OUTPATIENT
Start: 2021-01-13 | End: 2021-01-19 | Stop reason: HOSPADM

## 2021-01-13 RX ORDER — SPIRONOLACTONE 25 MG/1
25 TABLET ORAL DAILY
Status: DISCONTINUED | OUTPATIENT
Start: 2021-01-13 | End: 2021-01-19 | Stop reason: HOSPADM

## 2021-01-13 RX ORDER — TRAZODONE HYDROCHLORIDE 50 MG/1
50 TABLET ORAL NIGHTLY
Status: DISCONTINUED | OUTPATIENT
Start: 2021-01-13 | End: 2021-01-19 | Stop reason: HOSPADM

## 2021-01-13 RX ORDER — ACETAMINOPHEN 325 MG/1
TABLET ORAL
Status: COMPLETED
Start: 2021-01-13 | End: 2021-01-13

## 2021-01-13 RX ORDER — METHYLPREDNISOLONE SODIUM SUCCINATE 40 MG/ML
40 INJECTION, POWDER, LYOPHILIZED, FOR SOLUTION INTRAMUSCULAR; INTRAVENOUS EVERY 8 HOURS
Status: DISCONTINUED | OUTPATIENT
Start: 2021-01-13 | End: 2021-01-19 | Stop reason: HOSPADM

## 2021-01-13 RX ORDER — BUMETANIDE 0.25 MG/ML
1 INJECTION, SOLUTION INTRAMUSCULAR; INTRAVENOUS EVERY 12 HOURS
Status: DISCONTINUED | OUTPATIENT
Start: 2021-01-14 | End: 2021-01-15

## 2021-01-13 RX ORDER — ACETAMINOPHEN 325 MG/1
650 TABLET ORAL ONCE
Status: COMPLETED | OUTPATIENT
Start: 2021-01-13 | End: 2021-01-13

## 2021-01-13 RX ORDER — IPRATROPIUM BROMIDE AND ALBUTEROL SULFATE 2.5; .5 MG/3ML; MG/3ML
1 SOLUTION RESPIRATORY (INHALATION) 4 TIMES DAILY
Status: DISCONTINUED | OUTPATIENT
Start: 2021-01-13 | End: 2021-01-19 | Stop reason: HOSPADM

## 2021-01-13 RX ORDER — MONTELUKAST SODIUM 10 MG/1
10 TABLET ORAL NIGHTLY
Status: DISCONTINUED | OUTPATIENT
Start: 2021-01-13 | End: 2021-01-19 | Stop reason: HOSPADM

## 2021-01-13 RX ORDER — NICOTINE POLACRILEX 4 MG
15 LOZENGE BUCCAL PRN
Status: DISCONTINUED | OUTPATIENT
Start: 2021-01-13 | End: 2021-01-19 | Stop reason: HOSPADM

## 2021-01-13 RX ORDER — INSULIN GLARGINE 100 [IU]/ML
50 INJECTION, SOLUTION SUBCUTANEOUS 2 TIMES DAILY
Status: DISCONTINUED | OUTPATIENT
Start: 2021-01-13 | End: 2021-01-14

## 2021-01-13 RX ORDER — METHYLPREDNISOLONE SODIUM SUCCINATE 40 MG/ML
40 INJECTION, POWDER, LYOPHILIZED, FOR SOLUTION INTRAMUSCULAR; INTRAVENOUS ONCE
Status: COMPLETED | OUTPATIENT
Start: 2021-01-13 | End: 2021-01-13

## 2021-01-13 RX ORDER — FERROUS SULFATE 325(65) MG
325 TABLET ORAL
Status: DISCONTINUED | OUTPATIENT
Start: 2021-01-14 | End: 2021-01-19 | Stop reason: HOSPADM

## 2021-01-13 RX ORDER — PANTOPRAZOLE SODIUM 40 MG/1
40 TABLET, DELAYED RELEASE ORAL
Status: DISCONTINUED | OUTPATIENT
Start: 2021-01-14 | End: 2021-01-19 | Stop reason: HOSPADM

## 2021-01-13 RX ORDER — DEXTROSE MONOHYDRATE 25 G/50ML
12.5 INJECTION, SOLUTION INTRAVENOUS PRN
Status: DISCONTINUED | OUTPATIENT
Start: 2021-01-13 | End: 2021-01-19 | Stop reason: HOSPADM

## 2021-01-13 RX ORDER — SODIUM CHLORIDE 9 MG/ML
INJECTION, SOLUTION INTRAVENOUS CONTINUOUS
Status: ACTIVE | OUTPATIENT
Start: 2021-01-13 | End: 2021-01-14

## 2021-01-13 RX ORDER — DEXTROSE MONOHYDRATE 50 MG/ML
100 INJECTION, SOLUTION INTRAVENOUS PRN
Status: DISCONTINUED | OUTPATIENT
Start: 2021-01-13 | End: 2021-01-19 | Stop reason: HOSPADM

## 2021-01-13 RX ORDER — ONDANSETRON 2 MG/ML
4 INJECTION INTRAMUSCULAR; INTRAVENOUS EVERY 6 HOURS PRN
Status: DISCONTINUED | OUTPATIENT
Start: 2021-01-13 | End: 2021-01-19 | Stop reason: HOSPADM

## 2021-01-13 RX ORDER — GABAPENTIN 300 MG/1
300 CAPSULE ORAL 3 TIMES DAILY
Status: DISCONTINUED | OUTPATIENT
Start: 2021-01-13 | End: 2021-01-19 | Stop reason: HOSPADM

## 2021-01-13 RX ORDER — BUMETANIDE 0.25 MG/ML
2 INJECTION, SOLUTION INTRAMUSCULAR; INTRAVENOUS EVERY 12 HOURS
Status: DISCONTINUED | OUTPATIENT
Start: 2021-01-13 | End: 2021-01-13

## 2021-01-13 RX ORDER — METOPROLOL SUCCINATE 100 MG/1
100 TABLET, EXTENDED RELEASE ORAL DAILY
Status: DISCONTINUED | OUTPATIENT
Start: 2021-01-13 | End: 2021-01-15

## 2021-01-13 RX ORDER — IPRATROPIUM BROMIDE AND ALBUTEROL SULFATE 2.5; .5 MG/3ML; MG/3ML
1 SOLUTION RESPIRATORY (INHALATION) EVERY 4 HOURS PRN
Status: DISCONTINUED | OUTPATIENT
Start: 2021-01-13 | End: 2021-01-19 | Stop reason: HOSPADM

## 2021-01-13 RX ORDER — BUMETANIDE 1 MG/1
2 TABLET ORAL 2 TIMES DAILY
Status: DISCONTINUED | OUTPATIENT
Start: 2021-01-13 | End: 2021-01-13

## 2021-01-13 RX ORDER — DOCUSATE SODIUM 100 MG/1
100 CAPSULE, LIQUID FILLED ORAL NIGHTLY
Status: DISCONTINUED | OUTPATIENT
Start: 2021-01-13 | End: 2021-01-19 | Stop reason: HOSPADM

## 2021-01-13 RX ADMIN — INSULIN LISPRO 10 UNITS: 100 INJECTION, SOLUTION INTRAVENOUS; SUBCUTANEOUS at 17:25

## 2021-01-13 RX ADMIN — INSULIN GLARGINE 50 UNITS: 100 INJECTION, SOLUTION SUBCUTANEOUS at 21:24

## 2021-01-13 RX ADMIN — GABAPENTIN 300 MG: 300 CAPSULE ORAL at 17:31

## 2021-01-13 RX ADMIN — ACETAMINOPHEN 650 MG: 325 TABLET ORAL at 20:39

## 2021-01-13 RX ADMIN — LORAZEPAM 0.5 MG: 0.5 TABLET ORAL at 21:31

## 2021-01-13 RX ADMIN — METHYLPREDNISOLONE SODIUM SUCCINATE 40 MG: 40 INJECTION, POWDER, FOR SOLUTION INTRAMUSCULAR; INTRAVENOUS at 17:31

## 2021-01-13 RX ADMIN — IPRATROPIUM BROMIDE AND ALBUTEROL SULFATE 3 ML: .5; 3 SOLUTION RESPIRATORY (INHALATION) at 21:06

## 2021-01-13 RX ADMIN — BUMETANIDE 1 MG: 0.25 INJECTION, SOLUTION INTRAMUSCULAR; INTRAVENOUS at 03:58

## 2021-01-13 RX ADMIN — SPIRONOLACTONE 25 MG: 25 TABLET ORAL at 17:31

## 2021-01-13 RX ADMIN — GABAPENTIN 300 MG: 300 CAPSULE ORAL at 20:38

## 2021-01-13 RX ADMIN — METHYLPREDNISOLONE SODIUM SUCCINATE 40 MG: 40 INJECTION, POWDER, FOR SOLUTION INTRAMUSCULAR; INTRAVENOUS at 11:50

## 2021-01-13 RX ADMIN — METOPROLOL SUCCINATE 100 MG: 100 TABLET, EXTENDED RELEASE ORAL at 07:00

## 2021-01-13 RX ADMIN — INSULIN LISPRO 10 UNITS: 100 INJECTION, SOLUTION INTRAVENOUS; SUBCUTANEOUS at 09:36

## 2021-01-13 RX ADMIN — ACETAMINOPHEN 650 MG: 325 TABLET ORAL at 08:20

## 2021-01-13 RX ADMIN — ACETAMINOPHEN 650 MG: 325 TABLET ORAL at 00:35

## 2021-01-13 RX ADMIN — MONTELUKAST SODIUM 10 MG: 10 TABLET ORAL at 20:40

## 2021-01-13 RX ADMIN — DOCUSATE SODIUM 100 MG: 100 CAPSULE ORAL at 20:40

## 2021-01-13 RX ADMIN — INSULIN GLARGINE 50 UNITS: 100 INJECTION, SOLUTION SUBCUTANEOUS at 09:44

## 2021-01-13 RX ADMIN — ESCITALOPRAM 20 MG: 10 TABLET, FILM COATED ORAL at 20:41

## 2021-01-13 RX ADMIN — METHYLPREDNISOLONE SODIUM SUCCINATE 40 MG: 40 INJECTION, POWDER, FOR SOLUTION INTRAMUSCULAR; INTRAVENOUS at 07:00

## 2021-01-13 RX ADMIN — DILTIAZEM HYDROCHLORIDE 240 MG: 240 CAPSULE, EXTENDED RELEASE ORAL at 04:31

## 2021-01-13 RX ADMIN — INSULIN HUMAN 5 UNITS: 100 INJECTION, SOLUTION PARENTERAL at 12:18

## 2021-01-13 RX ADMIN — APIXABAN 5 MG: 5 TABLET, FILM COATED ORAL at 08:21

## 2021-01-13 RX ADMIN — APIXABAN 5 MG: 5 TABLET, FILM COATED ORAL at 20:40

## 2021-01-13 RX ADMIN — TRAZODONE HYDROCHLORIDE 50 MG: 50 TABLET ORAL at 20:41

## 2021-01-13 RX ADMIN — INSULIN LISPRO 10 UNITS: 100 INJECTION, SOLUTION INTRAVENOUS; SUBCUTANEOUS at 21:26

## 2021-01-13 ASSESSMENT — PAIN SCALES - GENERAL
PAINLEVEL_OUTOF10: 7
PAINLEVEL_OUTOF10: 6

## 2021-01-13 NOTE — PROGRESS NOTES
STAT lab called regarding labs still not drawn.      Francisco Kelly RN, BSN 911 or go to the nearest Emergency Room

## 2021-01-13 NOTE — ED NOTES
This nurse answered light ,pt requesting to sit at bedside, er cart uncomfortable, pt has pillows for support. This nurse asked Rn, pt states was able to sit up independently. Pt sitting on side of er cart, call light in reach at bedside and pt aware to use if wants to lie down and needs assistance.      Norm Small RN  01/13/21 5381

## 2021-01-13 NOTE — H&P
Hospital Medicine History & Physical      PCP: No primary care provider on file. Date of Admission: 1/12/2021    Date of Service: Jan 13, 2021    Chief Complaint:   SOB      History Of Present Illness:     77 y.o. female who presented to [facility] with * pt states she became SOB and wheezing onset 2 days ago becoming progressively worse. She had no chest pain, no tightness, or coughing. She denies palpitations. She has a PMH of PAF, HFpEF, COPD, Blood clots, II DM , HTN, HLD, and lymphedema     Past Medical History:          Diagnosis Date    (HFpEF) heart failure with preserved ejection fraction ([facility]     1/16/2018- echo- LVEF 55-65%    Anal fissure     Anemia 10/6/2017    Anxiety and depression     Arthritis     Asthma     Atrial fibrillation (HCC)     Eliquis    Blood transfusion     long time no reaction    CHF (congestive heart failure) (HCC)     Diastolic    COPD (chronic obstructive pulmonary disease) ([facility]     Disc disorder     DM2 (diabetes mellitus, type 2) ([facility] [facility]     on insulin    Fall due to stumbling 10/6/2017    GERD (gastroesophageal reflux disease)     History of blood transfusion     Hx of blood clots     Hyperlipidemia     Hypertension     Inappropriate sinus tachycardia     LVH (left ventricular hypertrophy)     moderate    Lymphadenitis, chronic 4/13/2018    Occipital neuralgia 11/2/2011    Respiratory acidosis 10/7/2017    Sinus tachycardia 2/16/2015       Past Surgical History:          Procedure Laterality Date    ANOSCOPY  10/25/2006    injection of anal sphincter with Botox, Franklyn Ortiz/Timmy, [facility]    ANOSCOPY  4/9/2007    injection of anal sphincter with Botox, Dr. Byron Monson, [address] [place] [address] ANOSCOPY  6/13/2007    injection of anal sphincter with Botox, Franklyn Diaz, [facility]    ANUS SURGERY  4/4/2006    [facility] f[facility]  4/18/2012    anal exam and injection of Botox 100 units into anal sphincter, Laila Jaeger, [facility]    APPENDECTOMY  1965   [address]    COLONOSCOPY  1/20/2004    cecal polyp, bx (no pathologic changes), Dr. John Diaz, [facility] COLONOSCOPY  8/11/2006    snare polypectomy terminal ileum polyp (granulation tissue polyp) and anal polyp (inflammatory/papilla), Dr. Ramón Bronson, [facility] COLONOSCOPY  3/23/2012    bx/cauterization proximal ascending colon polyp, injection of anal sphincter with Botox, Dr. Ramón Bronson, [facility]    ENDOSCOPY, COLON, DIAGNOSTIC      FRACTURE SURGERY      R leg fracture with surgery for repair    JOINT REPLACEMENT  2003    L knee    W[place] DEBRIDEMENT, SKIN, SUB-Q TISSUE,MUSCLE,=<20 SQ CM Left 11/30/2018    LEFT LEG EXCISIONAL  DEBRIDEMENT WITH TISSUE BIOPSY performed by Pedrito Esqueda DPM at [address] ENDOSCOPY  1/20/2004    gastritis, bx (no H pylori), Dr. John Diaz, [address] ENDOSCOPY N/A 6/1/2018    EGD BIOPSY performed by Jake Waite MD at [facility] 11/9/2018    EGD BIOPSY performed by Jake Waite MD at [facility] ENDOSCOPY       Medications Prior to Admission:      Prior to Admission medications    Medication Sig Start Date End Date Taking?  Authorizing Provider   OXYGEN Inhale 2 L into the lungs continuous 11/26/20  Yes Sadaf May, DO   escitalopram (LEXAPRO) 20 MG tablet Take 1 tablet by mouth daily 11/27/20  Yes Sadaf May DO   insulin glargine (LANTUS) 100 UNIT/ML injection vial Inject 50 Units into the skin 2 times daily 11/26/20  Yes Elo Bingham DO   insulin lispro (HUMALOG) 100 UNIT/ML injection vial Inject 40 Units into the skin 3 times daily (with meals) 11/26/20  Yes Elo Bingham, DO   dilTIAZem (CARDIZEM CD) 240 MG extended release capsule Take 1 capsule by mouth daily  Patient taking differently: Take 240 mg by mouth 2 times daily  11/27/20  Yes [address], DO   miconazole (MICOTIN) 2 % powder Apply topically 2 times daily. 11/26/20  Yes [address], DO   miconazole nitrate 2 % OINT Apply topically 2 times daily 11/26/20  Yes [address], DO   miconazole nitrate 2 % OINT Apply topically 3 times daily as needed (after toileting) 11/26/20  Yes Elo Bingham, DO   apixaban (ELIQUIS) 5 MG TABS tablet Take 5 mg by mouth 2 times daily   Yes Historical Provider, MD   bumetanide (BUMEX) 2 MG tablet Take 2 mg by mouth 2 times daily   Yes Historical Provider, MD   gabapentin (NEURONTIN) 300 MG capsule Take 300 mg by mouth 3 times daily.    Yes Historical Provider, MD   lansoprazole (PREVACID) 30 MG delayed release capsule Take 30 mg by mouth 2 times daily   Yes Historical Provider, MD   ipratropium-albuterol (Kayce Halsted) 0.5-2.5 (3) MG/3ML SOLN nebulizer solution Take 3 mLs by nebulization every 6 hours as needed for Shortness of Breath 9/30/20  Yes Ekaterina Morrison, DO   omega-3 acid ethyl esters (LOVAZA) 1 g capsule Take 2 capsules by mouth 2 times daily 9/1/20  Yes Tere Alvarez DO   spironolactone (ALDACTONE) 25 MG tablet Take 1 tablet by mouth daily 8/27/20  Yes Tere Alvarez DO   montelukast (SINGULAIR) 10 MG tablet Take 1 tablet by mouth nightly 8/27/20  Yes Tere Alvarez DO   albuterol sulfate  (90 Base) MCG/ACT inhaler Inhale 2 puffs into the lungs 4 times daily as needed for Wheezing 8/27/20  Yes Ekaterina Morrison DO   metoprolol succinate (TOPROL XL) 100 MG extended release tablet Take 1 tablet by mouth daily 8/25/20  Yes Tere Alvarez DO   ferrous sulfate (IRON 325) 325 (65 Fe) MG tablet Take 1 tablet by mouth daily (with breakfast) 8/25/20  Yes Tere Alvarez DO   acetaminophen (TYLENOL) 325 MG tablet Take 650 mg by mouth every 6 hours as needed for Pain   Yes Historical Provider, MD traZODone (DESYREL) 50 MG tablet Take 50 mg by mouth nightly    Historical Provider, MD   vitamin D (ERGOCALCIFEROL) 1.25 MG (96359 UT) CAPS capsule Take 50,000 Units by mouth once a week Given Monday    Historical Provider, MD       Allergies:  Statins    Social History:      The patient currently lives *with  and grandsons,    TOBACCO:   reports that she quit smoking about 16 years ago. Her smoking use included cigarettes. She started smoking about 41 years ago. She has a 37.50 pack-year smoking history. She has never used smokeless tobacco.  ETOH:   reports previous alcohol use. Family History:            Problem Relation Age of Onset    Heart Disease Mother     Diabetes Father     Colon Cancer Father     Other Father         BLINDNESS    Colon Cancer Sister     Other Sister         shingles- has had over 1 year    Diabetes Paternal Aunt     Diabetes Paternal Uncle     Diabetes Paternal Aunt     Diabetes Paternal Uncle     Diabetes Sister        REVIEW OF SYSTEMS:   Pertinent positives as noted in the HPI. All other systems reviewed and negative. PHYSICAL EXAM:    /84   Pulse 102   Temp 98.3 °F (36.8 °C)   Resp 22   Wt 262 lb (118.8 kg)   LMP  (LMP Unknown)   SpO2 98%   BMI 46.41 kg/m²     General appearance:  No apparent distress, appears stated age and cooperative. HEENT:  Normal cephalic, atraumatic without obvious deformity. Pupils equal, round, and reactive to light. Extra ocular muscles intact. Conjunctivae/corneas clear. Neck: Supple, with full range of motion. No jugular venous distention. Trachea midline. Respiratory:  Normal respiratory effort. Clear to auscultation, bilaterally without Rales/Wheezes/Rhonchi. Cardiovascular: IRREG  Abdomen: Soft, non-tender, non-distended with normal bowel sounds. Musculoskeletal:  No clubbing, cyanosis pos edema bilaterally of lower extrem, scabbing. Full range of motion without deformity.   Skin: hyperpigmented lower extrem, with scabbing and crusting   Neurologic:  Neurovascularly intact without any focal sensory/motor deficits. Cranial nerves: II-XII intact, grossly non-focal.  Psychiatric:  Alert and oriented, thought content appropriate, normal insight  Capillary Refill: Brisk,< 3 seconds   Peripheral Pulses: +2 palpable, equal bilaterally       Labs:     Recent Labs     01/12/21 2319   WBC 7.9   HGB 10.0*   HCT 32.7*        Recent Labs     01/12/21 2319      K 4.5   CL 97*   CO2 31*   BUN 20   CREATININE 0.8   CALCIUM 8.9     Recent Labs     01/12/21 2319   AST 13   ALT 16   BILITOT <0.2   ALKPHOS 94     Recent Labs     01/12/21 2319   INR 1.1     Recent Labs     01/12/21 2319   Coral Raid <0.01       Urinalysis:      Lab Results   Component Value Date    NITRU Negative 11/17/2020    WBCUA NONE 11/17/2020    BACTERIA NONE SEEN 11/17/2020    RBCUA 0-1 11/17/2020    BLOODU TRACE-INTACT 11/17/2020    SPECGRAV 1.010 11/17/2020    GLUCOSEU >=1000 11/17/2020       Radiology:     CXR: I have reviewed the CXR with the following interpretation:     XR CHEST PORTABLE   Final Result   No acute process. ASSESSMENT:    Active Hospital Problems    Diagnosis Date Noted    Atrial fibrillation with RVR ([facility] [address] [facility] [I48.91] 01/13/2021   PAF  II DM  HLD  HFpEF,ANXIETY  DEPRESSION  COPD/ASTHMA WITH EXACERBATION  HTN  LYMPEDEMA        PLAN:  PULM CONSULT  WOUND CARE  SSI  ELIQUIS  CARD AND PULM CONSULT      DVT Prophylaxis: ELIQUIS  Diet: DIABETIC  Code Status: FULL CODE    PT/OT Eval Status:  ORDERED    Dispo - *HOME    Electronically signed by Wadr Chang DO on 1/13/2021 at 1:21 PM Donal       Thank you No primary care provider on file. for the opportunity to be involved in this patient's care. If you have any questions or concerns please feel free to contact me at [phone].

## 2021-01-13 NOTE — PROGRESS NOTES
Pulmonary consult placed via [facility] serve to Dr Vielka Escalera.   Dr Pina Del Toro taking new consults

## 2021-01-13 NOTE — ED NOTES
Patient walked self to toilet and began yelling profanities at this RN as patient took her Oxygen off (to walk to BR). Patient then walked self back to bed and reapplied O2.        Erlinda Graves RN  01/13/21 2402

## 2021-01-13 NOTE — ED NOTES
bilateral legs are with large swelling with roxana, scaly skin.       Emelia Pelayo RN  01/13/21 0513

## 2021-01-13 NOTE — PROGRESS NOTES
At bedside to complete admission database with ED staff retaining care throughout. Pt complains of being SOB and is assisted to sitting upright at side of bed. Pt is alert and oriented. Pt assisted back into supine position in bed and side rails up x2 with call light at bedside. BERNABE Munguia. notified of patients requests for something to eat.

## 2021-01-13 NOTE — ED NOTES
Dr. Bridger Rosas in Dept and notified of . Directed to give Lantus as ordered.      Lora Ibrahim RN  01/13/21 9650

## 2021-01-13 NOTE — ED NOTES
Bed: 07  Expected date:   Expected time:   Means of arrival:   Comments:  ems     Darryn Castillo RN  01/12/21 0495

## 2021-01-13 NOTE — ED PROVIDER NOTES
HPI:  21,   Time: 11:12 PM YAA Sue is a 77 y.o. female presenting to the ED for sob./wheezing/inc o2 requirment, beginning 3 days ago. The complaint has been persistent, moderate in severity, and worsened by moderate exertion. Hx copd, on chronic o2 nc, worsening sob with wheezing. No fever, no n/v/d. Cough with yellow sputuem. Better with nebs. Bib ems. Hx same    Review of Systems:   Pertinent positives and negatives are stated within HPI, all other systems reviewed and are negative.          --------------------------------------------- PAST HISTORY ---------------------------------------------  Past Medical History:  has a past medical history of (HFpEF) heart failure with preserved ejection fraction ([address] Anal fissure, Anemia, Anxiety and depression, Arthritis, Asthma, Atrial fibrillation ([address] Blood transfusion, CHF (congestive heart failure) ([address] COPD (chronic obstructive pulmonary disease) ([address] Disc disorder, DM2 (diabetes mellitus, type 2) ([address] Fall due to stumbling, GERD (gastroesophageal reflux disease), History of blood transfusion, Hx of blood clots, Hyperlipidemia, Hypertension, Inappropriate sinus tachycardia, LVH (left ventricular hypertrophy), Lymphadenitis, chronic, Occipital neuralgia, Respiratory acidosis, and Sinus tachycardia. Past Surgical History:  has a past surgical history that includes Tonsillectomy (); Appendectomy ();  section ();  section ();  section (); Anus surgery (2006); Upper gastrointestinal endoscopy (2004); Colonoscopy (2004); Colonoscopy (2006); anoscopy (10/25/2006); anoscopy (2007); anoscopy (2007); Colonoscopy (3/23/2012); Anus surgery (2012); Upper gastrointestinal endoscopy (N/A, 2018); Upper gastrointestinal endoscopy (N/A, 2018); pr debridement, skin, sub-q tissue,muscle,=<20 sq cm (Left, 2018);  Endoscopy, colon, diagnostic; fracture surgery; and joint replacement (2003). Social History:  reports that she quit smoking about 16 years ago. Her smoking use included cigarettes. She started smoking about 41 years ago. She has a 37.50 pack-year smoking history. She has never used smokeless tobacco. She reports previous alcohol use. She reports previous drug use. Family History: family history includes Colon Cancer in her father and sister; Diabetes in her father, paternal aunt, paternal aunt, paternal uncle, paternal uncle, and sister; Heart Disease in her mother; Other in her father and sister. The patients home medications have been reviewed. Allergies: Statins        ---------------------------------------------------PHYSICAL EXAM--------------------------------------    Constitutional/General: Alert and oriented x3, well appearing, non toxic in NAD  Head: Normocephalic and atraumatic  Eyes: PERRL, EOMI, conjunctive normal, sclera non icteric  Mouth: Oropharynx clear, handling secretions, no trismus, no asymmetry of the posterior oropharynx or uvular edema  Neck: Supple, full ROM, non tender to palpation in the midline, no stridor, no crepitus, no meningeal signs  Respiratory: ary wheezing, tachypnic  Cardiovascular:  Regular rate. Regular rhythm. No murmurs, gallops, or rubs. 2+ distal pulses  Chest: No chest wall tenderness  GI:  Abdomen Soft, Non tender, Non distended. +BS. No organomegaly, no palpable masses,  No rebound, guarding, or rigidity. Musculoskeletal: Moves all extremities x 4. Warm and well perfused, no clubbing, cyanosis, or edema. Capillary refill <3 seconds  Integument: skin warm and dry. No rashes.    Lymphatic: no lymphadenopathy noted  Neurologic: GCS 15, no focal deficits, symmetric strength 5/5 in the upper and lower extremities bilaterally  Psychiatric: Normal Affect    -------------------------------------------------- RESULTS -------------------------------------------------  I have personally reviewed all laboratory and imaging results for this patient. Results are listed below.      LABS:  Results for orders placed or performed during the hospital encounter of 01/12/21   CBC Auto Differential   Result Value Ref Range    WBC 7.9 4.5 - 11.5 E9/L    RBC 3.89 3.50 - 5.50 E12/L    Hemoglobin 10.0 (L) 11.5 - 15.5 g/dL    Hematocrit 32.7 (L) 34.0 - 48.0 %    MCV 84.1 80.0 - 99.9 fL    MCH 25.7 (L) 26.0 - 35.0 pg    MCHC 30.6 (L) 32.0 - 34.5 %    RDW 15.7 (H) 11.5 - 15.0 fL    Platelets 547 408 - 830 E9/L    MPV 9.9 7.0 - 12.0 fL    Neutrophils % 62.4 43.0 - 80.0 %    Immature Granulocytes % 0.6 0.0 - 5.0 %    Lymphocytes % 26.2 20.0 - 42.0 %    Monocytes % 8.2 2.0 - 12.0 %    Eosinophils % 2.0 0.0 - 6.0 %    Basophils % 0.6 0.0 - 2.0 %    Neutrophils Absolute 4.93 1.80 - 7.30 E9/L    Immature Granulocytes # 0.05 E9/L    Lymphocytes Absolute 2.07 1.50 - 4.00 E9/L    Monocytes Absolute 0.65 0.10 - 0.95 E9/L    Eosinophils Absolute 0.16 0.05 - 0.50 E9/L    Basophils Absolute 0.05 0.00 - 0.20 E9/L   Comprehensive Metabolic Panel w/ Reflex to MG   Result Value Ref Range    Sodium 138 132 - 146 mmol/L    Potassium reflex Magnesium 4.5 3.5 - 5.0 mmol/L    Chloride 97 (L) 98 - 107 mmol/L    CO2 31 (H) 22 - 29 mmol/L    Anion Gap 10 7 - 16 mmol/L    Glucose 234 (H) 74 - 99 mg/dL    BUN 20 8 - 23 mg/dL    CREATININE 0.8 0.5 - 1.0 mg/dL    GFR Non-African American >60 >=60 mL/min/1.73    GFR African American >60     Calcium 8.9 8.6 - 10.2 mg/dL    Total Protein 6.6 6.4 - 8.3 g/dL    Alb 4.0 3.5 - 5.2 g/dL    Total Bilirubin <0.2 0.0 - 1.2 mg/dL    Alkaline Phosphatase 94 35 - 104 U/L    ALT 16 0 - 32 U/L    AST 13 0 - 31 U/L   Troponin   Result Value Ref Range    Troponin <0.01 0.00 - 0.03 ng/mL   Protime-INR   Result Value Ref Range    Protime 12.4 9.3 - 12.4 sec    INR 1.1    APTT   Result Value Ref Range    aPTT 31.4 24.5 - 35.1 sec   Lactic Acid, Plasma   Result Value Ref Range    Lactic Acid 2.5 (H) 0.5 - 2.2 mmol/L   COVID-19 Result Value Ref Range    SARS-CoV-2, NAAT Not Detected Not Detected   CK   Result Value Ref Range    Total CK 85 20 - 180 U/L   CK MB   Result Value Ref Range    CK-MB 2.7 0.0 - 4.3 ng/mL   Troponin   Result Value Ref Range    Troponin <0.01 0.00 - 0.03 ng/mL   Hemoglobin A1C   Result Value Ref Range    Hemoglobin A1C 10.2 (H) 4.0 - 5.6 %   Lactic Acid, Plasma   Result Value Ref Range    Lactic Acid 4.7 (HH) 0.5 - 2.2 mmol/L   Procalcitonin   Result Value Ref Range    Procalcitonin 0.12 (H) 0.00 - 0.08 ng/mL   Basic Metabolic Panel   Result Value Ref Range    Sodium 139 132 - 146 mmol/L    Potassium 5.0 3.5 - 5.0 mmol/L    Chloride 95 (L) 98 - 107 mmol/L    CO2 37 (H) 22 - 29 mmol/L    Anion Gap 7 7 - 16 mmol/L    Glucose 428 (H) 74 - 99 mg/dL    BUN 31 (H) 8 - 23 mg/dL    CREATININE 0.8 0.5 - 1.0 mg/dL    GFR Non-African American >60 >=60 mL/min/1.73    GFR African American >60     Calcium 9.3 8.6 - 10.2 mg/dL   Lactic Acid, Plasma   Result Value Ref Range    Lactic Acid 2.7 (H) 0.5 - 2.2 mmol/L   Brain Natriuretic Peptide   Result Value Ref Range    Pro-BNP 1,543 (H) 0 - 125 pg/mL   Lactic Acid, Plasma   Result Value Ref Range    Lactic Acid 4.3 (HH) 0.5 - 2.2 mmol/L   Lactic Acid, Plasma   Result Value Ref Range    Lactic Acid 4.9 (HH) 0.5 - 2.2 mmol/L   Blood Gas, Arterial   Result Value Ref Range    Date Analyzed [id number]     Time Analyzed 0298     Source: Blood Arterial     pH, Blood Gas 7.357 7.350 - 7.450    PCO2 59.8 (H) 35.0 - 45.0 mmHg    PO2 78.0 75.0 - 100.0 mmHg    HCO3 32.8 (H) 22.0 - 26.0 mmol/L    B.E. 5.9 (H) -3.0 - 3.0 mmol/L    O2 Sat 94.2 92.0 - 98.5 %    O2Hb 93.6 (L) 94.0 - 97.0 %    COHb 0.6 0.0 - 1.5 %    MetHb 0.0 0.0 - 1.5 %    O2 Content 14.3 mL/dL    HHb 5.8 (H) 0.0 - 5.0 %    tHb (est) 10.8 (L) 11.5 - 16.5 g/dL    Mode NC- 4  L     Date Of Collection      Time Collected      Pt Temp 37.0 C     ID 1926     Lab [id number]     Critical(s) Notified .  No Critical Values    Basic Metabolic Panel   Result Value Ref Range    Sodium 132 132 - 146 mmol/L    Potassium 4.7 3.5 - 5.0 mmol/L    Chloride 90 (L) 98 - 107 mmol/L    CO2 34 (H) 22 - 29 mmol/L    Anion Gap 8 7 - 16 mmol/L    Glucose 436 (H) 74 - 99 mg/dL    BUN 30 (H) 8 - 23 mg/dL    CREATININE 0.7 0.5 - 1.0 mg/dL    GFR Non-African American >60 >=60 mL/min/1.73    GFR African American >60     Calcium 9.2 8.6 - 10.2 mg/dL   Lactic Acid, Plasma   Result Value Ref Range    Lactic Acid 3.0 (H) 0.5 - 2.2 mmol/L   Lactic Acid, Plasma   Result Value Ref Range    Lactic Acid 3.4 (H) 0.5 - 2.2 mmol/L   Brain Natriuretic Peptide   Result Value Ref Range    Pro- (H) 0 - 125 pg/mL   TSH without Reflex   Result Value Ref Range    TSH 0.575 0.270 - 4.200 uIU/mL   T4, Free   Result Value Ref Range    T4 Free 1.05 0.93 - 1.70 ng/dL   Basic Metabolic Panel   Result Value Ref Range    Sodium 138 132 - 146 mmol/L    Potassium 4.5 3.5 - 5.0 mmol/L    Chloride 90 (L) 98 - 107 mmol/L    CO2 34 (H) 22 - 29 mmol/L    Anion Gap 14 7 - 16 mmol/L    Glucose 269 (H) 74 - 99 mg/dL    BUN 34 (H) 8 - 23 mg/dL    CREATININE 0.8 0.5 - 1.0 mg/dL    GFR Non-African American >60 >=60 mL/min/1.73    GFR African American >60     Calcium 9.8 8.6 - 10.2 mg/dL   Magnesium   Result Value Ref Range    Magnesium 2.1 1.6 - 2.6 mg/dL   Lipid panel   Result Value Ref Range    Cholesterol, Total 355 (H) 0 - 199 mg/dL    Triglycerides 298 (H) 0 - 149 mg/dL    HDL 52 >40 mg/dL    LDL Calculated 243 (H) 0 - 99 mg/dL    VLDL Cholesterol Calculated 60 mg/dL   POCT Glucose   Result Value Ref Range    Glucose 445 mg/dL    QC OK?  yes    POCT Glucose   Result Value Ref Range    Meter Glucose 445 (H) 74 - 99 mg/dL   POCT Glucose   Result Value Ref Range    Meter Glucose 431 (H) 74 - 99 mg/dL   POCT Glucose   Result Value Ref Range    Meter Glucose 373 (H) 74 - 99 mg/dL   POCT Glucose   Result Value Ref Range    Meter Glucose 361 (H) 74 - 99 mg/dL   POCT Glucose   Result Value Ref Range Meter Glucose 435 (H) 74 - 99 mg/dL   POCT Glucose   Result Value Ref Range    Meter Glucose 421 (H) 74 - 99 mg/dL   POCT Glucose   Result Value Ref Range    Meter Glucose 411 (H) 74 - 99 mg/dL   POCT Glucose   Result Value Ref Range    Meter Glucose 360 (H) 74 - 99 mg/dL   POCT Glucose   Result Value Ref Range    Meter Glucose 397 (H) 74 - 99 mg/dL   POCT Glucose   Result Value Ref Range    Meter Glucose 387 (H) 74 - 99 mg/dL   POCT Glucose   Result Value Ref Range    Meter Glucose 302 (H) 74 - 99 mg/dL   POCT Glucose   Result Value Ref Range    Meter Glucose 274 (H) 74 - 99 mg/dL   POCT Glucose   Result Value Ref Range    Meter Glucose 331 (H) 74 - 99 mg/dL   POCT Glucose   Result Value Ref Range    Meter Glucose 264 (H) 74 - 99 mg/dL   POCT Glucose   Result Value Ref Range    Meter Glucose 283 (H) 74 - 99 mg/dL   POCT Glucose   Result Value Ref Range    Meter Glucose 269 (H) 74 - 99 mg/dL   EKG 12 Lead   Result Value Ref Range    Ventricular Rate 134 BPM    P-R Interval 200 ms    QRS Duration 90 ms    Q-T Interval 288 ms    QTc Calculation (Bazett) 430 ms    R Axis 156 degrees    T Axis 38 degrees   EKG 12 Lead   Result Value Ref Range    Ventricular Rate 84 BPM    Atrial Rate 84 BPM    P-R Interval 132 ms    QRS Duration 108 ms    Q-T Interval 396 ms    QTc Calculation (Bazett) 467 ms    P Axis 62 degrees    R Axis 21 degrees    T Axis 49 degrees       RADIOLOGY:  Interpreted by Radiologist.  XR CHEST PORTABLE   Final Result   Shallow lung volumes with bibasilar airspace opacities which may be on the   basis of atelectasis. Multifocal pneumonia or pulmonary edema could have a   similar appearance. XR CHEST PORTABLE   Final Result   No acute process. EKG: This EKG is signed and interpreted by the EP.     Time: 2247  Rate: 130  Rhythm: Sinus  Interpretation: non-specific EKG  Comparison: None      ------------------------- NURSING NOTES AND VITALS REVIEWED ---------------------------   The nursing notes within the ED encounter and vital signs as below have been reviewed by myself. BP (!) 152/70   Pulse 62   Temp 97.8 °F (36.6 °C) (Temporal)   Resp 18   Ht 5' 3\" (1.6 m)   Wt 284 lb 1.6 oz (128.9 kg)   LMP  (LMP Unknown)   SpO2 97%   BMI 50.33 kg/m²   Oxygen Saturation Interpretation: Abnormal and Improved after treatment    The patients available past medical records and past encounters were reviewed.         ------------------------------ ED COURSE/MEDICAL DECISION MAKING----------------------  Medications   apixaban (ELIQUIS) tablet 5 mg (5 mg Oral Given 1/16/21 0821)   dilTIAZem (CARDIZEM CD) extended release capsule 240 mg (240 mg Oral Given 1/16/21 0820)   ipratropium-albuterol (DUONEB) nebulizer solution 1 ampule (has no administration in time range)   escitalopram (LEXAPRO) tablet 20 mg (20 mg Oral Given 1/16/21 0820)   ferrous sulfate (IRON 325) tablet 325 mg (325 mg Oral Given 1/16/21 0820)   gabapentin (NEURONTIN) capsule 300 mg (300 mg Oral Given 1/16/21 1329)   ipratropium-albuterol (DUONEB) nebulizer solution 3 mL (3 mLs Nebulization Given 1/16/21 1540)   montelukast (SINGULAIR) tablet 10 mg (10 mg Oral Given 1/15/21 2026)   spironolactone (ALDACTONE) tablet 25 mg (25 mg Oral Given 1/16/21 0820)   traZODone (DESYREL) tablet 50 mg (50 mg Oral Given 1/15/21 2026)   vitamin D (ERGOCALCIFEROL) capsule 50,000 Units (has no administration in time range)   docusate sodium (COLACE) capsule 100 mg (100 mg Oral Given 1/15/21 2025)   pantoprazole (PROTONIX) tablet 40 mg (40 mg Oral Given 1/16/21 0527)   glucose (GLUTOSE) 40 % oral gel 15 g (has no administration in time range)   dextrose 50 % IV solution (has no administration in time range)   glucagon (rDNA) injection 1 mg (has no administration in time range)   dextrose 5 % solution (has no administration in time range)   ondansetron (ZOFRAN) injection 4 mg (has no administration in time range)   acetaminophen (TYLENOL) tablet 650 mg (650 mg Oral Given 1/15/21 2145)   methylPREDNISolone sodium (SOLU-MEDROL) injection 40 mg (40 mg Intravenous Given 1/16/21 1711)   0.9 % sodium chloride infusion ( Intravenous Held by provider 1/13/21 2138)   LORazepam (ATIVAN) tablet 0.5 mg (0.5 mg Oral Given 1/15/21 1427)   insulin lispro (HUMALOG) injection vial 0-18 Units (9 Units Subcutaneous Given 1/16/21 1651)   insulin lispro (HUMALOG) injection vial 0-9 Units (6 Units Subcutaneous Given 1/15/21 2030)   miconazole (MICOTIN) 2 % powder ( Topical Given 1/16/21 0822)   metoprolol succinate (TOPROL XL) extended release tablet 100 mg (100 mg Oral Given 1/16/21 0820)   Mineral Oil-Hydrophil Petrolat OINT ( Topical Given 1/16/21 0821)     And   Mineral Oil-Hydrophil Petrolat OINT (has no administration in time range)   bumetanide (BUMEX) injection 2 mg (2 mg Intravenous Given 1/16/21 1710)   insulin glargine (LANTUS) injection vial 100 Units (100 Units Subcutaneous Given 1/16/21 0608)   insulin lispro (HUMALOG) injection vial 30 Units (30 Units Subcutaneous Given 1/16/21 1718)   traMADol (ULTRAM) tablet 50 mg (50 mg Oral Given 1/16/21 1418)   ipratropium-albuterol (DUONEB) nebulizer solution 1 ampule (1 ampule Inhalation Given 1/12/21 2328)   methylPREDNISolone sodium (SOLU-MEDROL) injection 125 mg (125 mg Intravenous Given 1/12/21 2326)   magnesium sulfate 2 g in 50 mL IVPB premix (0 g Intravenous Stopped 1/13/21 0150)   acetaminophen (TYLENOL) tablet 650 mg (650 mg Oral Given 1/13/21 0820)   methylPREDNISolone sodium (SOLU-MEDROL) injection 40 mg (40 mg Intravenous Given 1/13/21 0700)   insulin regular (HUMULIN R;NOVOLIN R) injection 5 Units (5 Units Subcutaneous Given 1/13/21 1218)   insulin glargine (LANTUS) injection vial 10 Units (10 Units Subcutaneous Given 1/14/21 1122)   0.9 % sodium chloride bolus (0 mLs Intravenous Stopped 1/14/21 2115)         ED COURSE:       Medical Decision Making:    Pt inc o2 requirement, copd clincially, better with inc nc and tx admit for further care      This patient's ED course included: a personal history and physicial examination    This patient has remained hemodynamically stable during their ED course. Re-Evaluations:             Re-evaluation. Patients symptoms are improving    Re-examination  1/12/21   11:12 PM EST          Vital Signs:   Vitals:    01/16/21 0000 01/16/21 0743 01/16/21 1540 01/16/21 1545   BP:  (!) 151/68  (!) 152/70   Pulse:  74  62   Resp: 19 20 18 18   Temp:  97.7 °F (36.5 °C)  97.8 °F (36.6 °C)   TempSrc:  Temporal  Temporal   SpO2:  97% 97% 97%   Weight:       Height:         Card/Pulm:  Rhythm: normal rate. Heart Sounds: no murmurs, gallops, or rubs. clear to auscultation, no wheezes or rales and unlabored breathing. Capillary Refill: normal.  Radial Pulse:  equal.  Skin:  Warm. Consultations:             sound    Critical Care: 35 min        Counseling: The emergency provider has spoken with the patient and discussed todays results, in addition to providing specific details for the plan of care and counseling regarding the diagnosis and prognosis. Questions are answered at this time and they are agreeable with the plan.       --------------------------------- IMPRESSION AND DISPOSITION ---------------------------------    IMPRESSION  1. COPD exacerbation ([address]    2. Hypoxia        DISPOSITION  Disposition: Admit to telemetry  Patient condition is stable    NOTE: This report was transcribed using voice recognition software.  Every effort was made to ensure accuracy; however, inadvertent computerized transcription errors may be present        Princess Onelia MD  01/16/21 [id number]

## 2021-01-14 LAB
ANION GAP SERPL CALCULATED.3IONS-SCNC: 7 MMOL/L (ref 7–16)
B.E.: 5.9 MMOL/L (ref -3–3)
BUN BLDV-MCNC: 31 MG/DL (ref 8–23)
CALCIUM SERPL-MCNC: 9.3 MG/DL (ref 8.6–10.2)
CHLORIDE BLD-SCNC: 95 MMOL/L (ref 98–107)
CO2: 37 MMOL/L (ref 22–29)
COHB: 0.6 % (ref 0–1.5)
CREAT SERPL-MCNC: 0.8 MG/DL (ref 0.5–1)
CRITICAL: ABNORMAL
DATE ANALYZED: ABNORMAL
DATE OF COLLECTION: ABNORMAL
GFR AFRICAN AMERICAN: >60
GFR NON-AFRICAN AMERICAN: >60 ML/MIN/1.73
GLUCOSE BLD-MCNC: 428 MG/DL (ref 74–99)
HCO3: 32.8 MMOL/L (ref 22–26)
HHB: 5.8 % (ref 0–5)
LAB: ABNORMAL
LACTIC ACID: 2.7 MMOL/L (ref 0.5–2.2)
LACTIC ACID: 4.3 MMOL/L (ref 0.5–2.2)
LACTIC ACID: 4.9 MMOL/L (ref 0.5–2.2)
Lab: ABNORMAL
METER GLUCOSE: 360 MG/DL (ref 74–99)
METER GLUCOSE: 397 MG/DL (ref 74–99)
METER GLUCOSE: 411 MG/DL (ref 74–99)
METER GLUCOSE: 421 MG/DL (ref 74–99)
METER GLUCOSE: 435 MG/DL (ref 74–99)
METHB: 0 % (ref 0–1.5)
MODE: ABNORMAL
O2 CONTENT: 14.3 ML/DL
O2 SATURATION: 94.2 % (ref 92–98.5)
O2HB: 93.6 % (ref 94–97)
OPERATOR ID: 1926
PATIENT TEMP: 37 C
PCO2: 59.8 MMHG (ref 35–45)
PH BLOOD GAS: 7.36 (ref 7.35–7.45)
PO2: 78 MMHG (ref 75–100)
POTASSIUM SERPL-SCNC: 5 MMOL/L (ref 3.5–5)
SODIUM BLD-SCNC: 139 MMOL/L (ref 132–146)
SOURCE, BLOOD GAS: ABNORMAL
THB: 10.8 G/DL (ref 11.5–16.5)
TIME ANALYZED: 1344

## 2021-01-14 PROCEDURE — 6370000000 HC RX 637 (ALT 250 FOR IP): Performed by: FAMILY MEDICINE

## 2021-01-14 PROCEDURE — 97530 THERAPEUTIC ACTIVITIES: CPT

## 2021-01-14 PROCEDURE — 6370000000 HC RX 637 (ALT 250 FOR IP): Performed by: HOSPITALIST

## 2021-01-14 PROCEDURE — 2060000000 HC ICU INTERMEDIATE R&B

## 2021-01-14 PROCEDURE — 83605 ASSAY OF LACTIC ACID: CPT

## 2021-01-14 PROCEDURE — 94760 N-INVAS EAR/PLS OXIMETRY 1: CPT

## 2021-01-14 PROCEDURE — 6370000000 HC RX 637 (ALT 250 FOR IP): Performed by: INTERNAL MEDICINE

## 2021-01-14 PROCEDURE — 6360000002 HC RX W HCPCS: Performed by: INTERNAL MEDICINE

## 2021-01-14 PROCEDURE — 97166 OT EVAL MOD COMPLEX 45 MIN: CPT

## 2021-01-14 PROCEDURE — 2580000003 HC RX 258: Performed by: NURSE PRACTITIONER

## 2021-01-14 PROCEDURE — 80048 BASIC METABOLIC PNL TOTAL CA: CPT

## 2021-01-14 PROCEDURE — 2700000000 HC OXYGEN THERAPY PER DAY

## 2021-01-14 PROCEDURE — 82805 BLOOD GASES W/O2 SATURATION: CPT

## 2021-01-14 PROCEDURE — 94640 AIRWAY INHALATION TREATMENT: CPT

## 2021-01-14 PROCEDURE — 97162 PT EVAL MOD COMPLEX 30 MIN: CPT

## 2021-01-14 PROCEDURE — 2500000003 HC RX 250 WO HCPCS: Performed by: HOSPITALIST

## 2021-01-14 PROCEDURE — 94660 CPAP INITIATION&MGMT: CPT

## 2021-01-14 PROCEDURE — 82962 GLUCOSE BLOOD TEST: CPT

## 2021-01-14 PROCEDURE — 36415 COLL VENOUS BLD VENIPUNCTURE: CPT

## 2021-01-14 RX ORDER — 0.9 % SODIUM CHLORIDE 0.9 %
250 INTRAVENOUS SOLUTION INTRAVENOUS ONCE
Status: COMPLETED | OUTPATIENT
Start: 2021-01-14 | End: 2021-01-14

## 2021-01-14 RX ORDER — PETROLATUM 42 G/100G
OINTMENT TOPICAL 3 TIMES DAILY PRN
Status: DISCONTINUED | OUTPATIENT
Start: 2021-01-14 | End: 2021-01-15 | Stop reason: SDUPTHER

## 2021-01-14 RX ORDER — INSULIN GLARGINE 100 [IU]/ML
60 INJECTION, SOLUTION SUBCUTANEOUS 2 TIMES DAILY
Status: DISCONTINUED | OUTPATIENT
Start: 2021-01-14 | End: 2021-01-15

## 2021-01-14 RX ORDER — INSULIN GLARGINE 100 [IU]/ML
10 INJECTION, SOLUTION SUBCUTANEOUS ONCE
Status: COMPLETED | OUTPATIENT
Start: 2021-01-14 | End: 2021-01-14

## 2021-01-14 RX ORDER — PETROLATUM 42 G/100G
OINTMENT TOPICAL 2 TIMES DAILY
Status: DISCONTINUED | OUTPATIENT
Start: 2021-01-14 | End: 2021-01-15 | Stop reason: SDUPTHER

## 2021-01-14 RX ADMIN — PETROLATUM: 42 OINTMENT TOPICAL at 16:28

## 2021-01-14 RX ADMIN — MICONAZOLE NITRATE: 20.6 POWDER TOPICAL at 20:21

## 2021-01-14 RX ADMIN — PANTOPRAZOLE SODIUM 40 MG: 40 TABLET, DELAYED RELEASE ORAL at 06:26

## 2021-01-14 RX ADMIN — INSULIN GLARGINE 50 UNITS: 100 INJECTION, SOLUTION SUBCUTANEOUS at 08:39

## 2021-01-14 RX ADMIN — GABAPENTIN 300 MG: 300 CAPSULE ORAL at 13:28

## 2021-01-14 RX ADMIN — IPRATROPIUM BROMIDE AND ALBUTEROL SULFATE 3 ML: .5; 3 SOLUTION RESPIRATORY (INHALATION) at 12:53

## 2021-01-14 RX ADMIN — IPRATROPIUM BROMIDE AND ALBUTEROL SULFATE 3 ML: .5; 3 SOLUTION RESPIRATORY (INHALATION) at 08:52

## 2021-01-14 RX ADMIN — SODIUM CHLORIDE 250 ML: 9 INJECTION, SOLUTION INTRAVENOUS at 16:46

## 2021-01-14 RX ADMIN — METHYLPREDNISOLONE SODIUM SUCCINATE 40 MG: 40 INJECTION, POWDER, FOR SOLUTION INTRAMUSCULAR; INTRAVENOUS at 08:39

## 2021-01-14 RX ADMIN — METOPROLOL SUCCINATE 100 MG: 100 TABLET, EXTENDED RELEASE ORAL at 08:39

## 2021-01-14 RX ADMIN — IPRATROPIUM BROMIDE AND ALBUTEROL SULFATE 3 ML: .5; 3 SOLUTION RESPIRATORY (INHALATION) at 17:43

## 2021-01-14 RX ADMIN — PETROLATUM: 42 OINTMENT TOPICAL at 20:21

## 2021-01-14 RX ADMIN — ACETAMINOPHEN 650 MG: 325 TABLET ORAL at 12:19

## 2021-01-14 RX ADMIN — METHYLPREDNISOLONE SODIUM SUCCINATE 40 MG: 40 INJECTION, POWDER, FOR SOLUTION INTRAMUSCULAR; INTRAVENOUS at 16:27

## 2021-01-14 RX ADMIN — METHYLPREDNISOLONE SODIUM SUCCINATE 40 MG: 40 INJECTION, POWDER, FOR SOLUTION INTRAMUSCULAR; INTRAVENOUS at 00:59

## 2021-01-14 RX ADMIN — DILTIAZEM HYDROCHLORIDE 240 MG: 240 CAPSULE, EXTENDED RELEASE ORAL at 08:39

## 2021-01-14 RX ADMIN — MICONAZOLE NITRATE: 20.6 POWDER TOPICAL at 16:28

## 2021-01-14 RX ADMIN — ESCITALOPRAM 20 MG: 10 TABLET, FILM COATED ORAL at 08:39

## 2021-01-14 RX ADMIN — INSULIN LISPRO 10 UNITS: 100 INJECTION, SOLUTION INTRAVENOUS; SUBCUTANEOUS at 05:50

## 2021-01-14 RX ADMIN — INSULIN LISPRO 18 UNITS: 100 INJECTION, SOLUTION INTRAVENOUS; SUBCUTANEOUS at 11:22

## 2021-01-14 RX ADMIN — APIXABAN 5 MG: 5 TABLET, FILM COATED ORAL at 08:39

## 2021-01-14 RX ADMIN — INSULIN LISPRO 5 UNITS: 100 INJECTION, SOLUTION INTRAVENOUS; SUBCUTANEOUS at 11:23

## 2021-01-14 RX ADMIN — INSULIN LISPRO 5 UNITS: 100 INJECTION, SOLUTION INTRAVENOUS; SUBCUTANEOUS at 16:29

## 2021-01-14 RX ADMIN — APIXABAN 5 MG: 5 TABLET, FILM COATED ORAL at 20:22

## 2021-01-14 RX ADMIN — INSULIN LISPRO 7 UNITS: 100 INJECTION, SOLUTION INTRAVENOUS; SUBCUTANEOUS at 20:26

## 2021-01-14 RX ADMIN — SPIRONOLACTONE 25 MG: 25 TABLET ORAL at 08:39

## 2021-01-14 RX ADMIN — IPRATROPIUM BROMIDE AND ALBUTEROL SULFATE 3 ML: .5; 3 SOLUTION RESPIRATORY (INHALATION) at 21:34

## 2021-01-14 RX ADMIN — GABAPENTIN 300 MG: 300 CAPSULE ORAL at 20:22

## 2021-01-14 RX ADMIN — LORAZEPAM 0.5 MG: 0.5 TABLET ORAL at 22:15

## 2021-01-14 RX ADMIN — INSULIN GLARGINE 60 UNITS: 100 INJECTION, SOLUTION SUBCUTANEOUS at 20:25

## 2021-01-14 RX ADMIN — MONTELUKAST SODIUM 10 MG: 10 TABLET ORAL at 20:22

## 2021-01-14 RX ADMIN — TRAZODONE HYDROCHLORIDE 50 MG: 50 TABLET ORAL at 20:22

## 2021-01-14 RX ADMIN — DOCUSATE SODIUM 100 MG: 100 CAPSULE ORAL at 20:22

## 2021-01-14 RX ADMIN — FERROUS SULFATE TAB 325 MG (65 MG ELEMENTAL FE) 325 MG: 325 (65 FE) TAB at 08:39

## 2021-01-14 RX ADMIN — INSULIN GLARGINE 10 UNITS: 100 INJECTION, SOLUTION SUBCUTANEOUS at 11:22

## 2021-01-14 RX ADMIN — INSULIN LISPRO 15 UNITS: 100 INJECTION, SOLUTION INTRAVENOUS; SUBCUTANEOUS at 16:27

## 2021-01-14 RX ADMIN — GABAPENTIN 300 MG: 300 CAPSULE ORAL at 08:39

## 2021-01-14 RX ADMIN — ACETAMINOPHEN 650 MG: 325 TABLET ORAL at 21:14

## 2021-01-14 ASSESSMENT — PAIN SCALES - GENERAL
PAINLEVEL_OUTOF10: 3
PAINLEVEL_OUTOF10: 6

## 2021-01-14 NOTE — PROGRESS NOTES
Hospitalist Progress Note      PCP: No primary care provider on file. Date of Admission: 1/12/2021    Chief Complaint: shortness of breath    Hospital Course: This is a 57-year-old female with past medical history of chronic diastolic CHF, COPD on chronic home oxygen; paroxysmal atrial fibrillation, diabetes mellitus, hypertension who presented to the emergency department with worsening shortness of breath, wheezing. She reports have chronic home oxygen had to be increased from 2 L/min to 4 L/min the past few days but she is still progressively got short of breath prompting presentation to the emergency department, she was in A. fib with RVR. Chest x-ray was negative for acute cardiopulmonary abnormality; SARS-CoV-2 screen was negative. Patient was admitted for COPD exacerbation. Subjective: Pt was seen and examined at bedside. No acute event overnight; reports improved shortness of breath, denies any CP or palpitation.      Medications:  Reviewed    Infusion Medications    dextrose       Scheduled Medications    apixaban  5 mg Oral BID    dilTIAZem  240 mg Oral Daily    insulin lispro  0-10 Units Subcutaneous 4x Daily AC & HS    metoprolol succinate  100 mg Oral Daily    escitalopram  20 mg Oral Daily    ferrous sulfate  325 mg Oral Daily with breakfast    gabapentin  300 mg Oral TID    insulin glargine  50 Units Subcutaneous BID    ipratropium-albuterol  1 vial Nebulization 4x daily    montelukast  10 mg Oral Nightly    spironolactone  25 mg Oral Daily    traZODone  50 mg Oral Nightly    [START ON 1/18/2021] vitamin D  50,000 Units Oral Weekly    docusate sodium  100 mg Oral Nightly    pantoprazole  40 mg Oral QAM AC    methylPREDNISolone  40 mg Intravenous Q8H    [Held by provider] bumetanide  1 mg Intravenous Q12H     PRN Meds: ipratropium-albuterol, glucose, dextrose, glucagon (rDNA), dextrose, ondansetron, acetaminophen, LORazepam      Intake/Output Summary (Last 24 hours) at 1/14/2021 1022  Last data filed at 1/14/2021 0933  Gross per 24 hour   Intake 840 ml   Output 1950 ml   Net -1110 ml       Exam:    BP (!) 168/84   Pulse 97   Temp 97.4 °F (36.3 °C) (Temporal)   Resp 16   Ht 5' 3\" (1.6 m)   Wt 289 lb 9.6 oz (131.4 kg)   LMP  (LMP Unknown)   SpO2 98%   BMI 51.30 kg/m²     General appearance: Lying comfortably in bed. No apparent distress. Respiratory: Clear to auscultation bilaterally. Cardiovascular: Normal S1/S2. Regular rhythm and rate. Abdomen: Soft, non-tender, non-distended with normal bowel sounds. Musculoskeletal: No clubbing, cyanosis or edema bilaterally. Skin: Bilateral lower extremity lymphedema with chronic stasis dermatitis  Neurologic:  No focal deficit. Psychiatric: Alert and oriented, thought content appropriate, normal insight  Peripheral Pulses: +2 palpable, equal bilaterally       Labs:   Recent Labs     01/12/21 2319   WBC 7.9   HGB 10.0*   HCT 32.7*        Recent Labs     01/12/21 2319      K 4.5   CL 97*   CO2 31*   BUN 20   CREATININE 0.8   CALCIUM 8.9     Recent Labs     01/12/21 2319   AST 13   ALT 16   BILITOT <0.2   ALKPHOS 94     Recent Labs     01/12/21 2319   INR 1.1     Recent Labs     01/12/21 2319 01/13/21  1640   CKTOTAL  --  85   TROPONINI <0.01 <0.01       Radiology:  XR CHEST PORTABLE   Final Result   No acute process. Active Hospital Problems    Diagnosis Date Noted    Atrial fibrillation with RVR (HCC) [I48.91] 01/13/2021       Assessment  Acute on chronic respiratory failure - secondary to below; improving, now on 4 L oxygen via nasal cannula  COPD with acute exacerbation  Paroxysmal A. fib with RVR - secondary to above, rate better controlled. Now in sinus rhythm  Type 2 diabetes mellitus - poor glycemic control likely secondary to steroid therapy  Chronic diastolic CHF - stable, appears compensated at this time  Hypertension  Hyperlipidemia  Morbid obesity - Body mass index is 51.3 kg/m². Chronic lower extremities lymphedema    Plan:  Supplemental oxygen as needed to keep saturation above 92%; wean down FiO2 to home dose  IV Solu-Medrol, scheduled and PRN bronchodilators  Continue with Cardizem, Toprol-XL at home dose  On Eliquis  Increase Lantus to 60 units twice daily, add prandial dose of Humalog at 5 units, increase sliding scale to high dose    Diet: DIET CARB CONTROL;  Code Status: Full Code    PT/OT Eval Status: pending    Dispo - Home once jose maria Mcdonnell MD 1/14/2021 10:22 AM

## 2021-01-14 NOTE — CONSULTS
Lennox Mandel M.D.,[facility]  Jake Morgan D.O., F.A.C.O.I., Vida Jiang M.D. Cecilio Pierre M.D., Pearson Favre, M.D. Jannette Peter D.O. Patient:  Mauro Arias 77 y.o. female MRN: [id number]     Date of Service: 1/14/2021      PULMONARY CONSULTATION    Reason for Consultation: wheezing, dyspnea  Referring Physician: Dr. Familia Couch DO    Communication with the referring physician will be sent via the electronic medical record. Chief Complaint: shortness of breath    CODE STATUS: FULL    SUBJECTIVE:  HPI:  Mauro Arias is a 77 y.o.  female who we are asked to evaluate in consultation for wheezing and dyspnea. She is a known patient to our service followed during recent hospital admission November 2020 for  SAM,  hypercapnic respiratory failure, and mild intermittent asthma. Back in 2018 she underwent sleep [place] demonstrating AHI of 23 moderate SAM and was recommended to have a repeat titration study. This was never completed and there was no office follow-up. PFTs from 2018 with severe restriction likely due to body habitus, mild asthma, slight response to bronchodilator administration. Uses albuterol only as needed at home for her lungs. She does have a nebulizer. She has a past medical history for untreated SAM, chronic home oxygen dependence 4 L nasal cannula, asthma, morbid obesity, chronic lymphedema, anal fissure, A. fib, anemia, CAD, GERD, heart failure, hepatitis C, arthritis, restrictive lung disease, and medical noncompliance. She presented to the ED at [facility] on 1/12/2021 with a 2-day history of progressive shortness of breath and wheezing. Her symptoms did not improve with breathing treatments, therefore she came to the hospital for further evaluation. History is limited due to patient hypersomnolence therefore information has been obtained from extensive review of her medical record.   Chest x-ray obtained demonstrating no acute process, no infiltrates or effusions identified. No recent CT chest available for review. May 2020 there is a CT abdomen-lung windows demonstrate areas of subpleural increased density discrete atelectasis in the lower lobes. Lab testing-ABG 7.35/59/78/32/5.9/94% on 4 L nasal cannula. Lactic acid 2.7 now up to 4.3, fluids currently on hold per primary team.  Sodium 139, potassium 5.0, BUN 31, creatinine 0.8, blood glucose elevated 428, WBC 7.9, hemoglobin 10.0, lymphocytes 26.2, platelets 019, KLIIV-95 rapid testing negative. She is currently lying in bed able to respond to some questions but is drifting asleep very easily without answering. She states that she came to the hospital for worsening shortness of breath. Oxygen on at 4 L nasal cannula. ABG demonstrating hypercapnic respiratory failure. Will order noninvasive ventilation in the form of AVAPS for respiratory support.       Past Medical History:   Diagnosis Date    (HFpEF) heart failure with preserved ejection fraction ([facility] [address]     1/16/2018- echo- LVEF 55-65%    Anal fissure     Anemia 10/6/2017    Anxiety and depression     Arthritis     Asthma     Atrial fibrillation (HCC)     Eliquis    Blood transfusion     long time no reaction    CHF (congestive heart failure) (HCC)     Diastolic    COPD (chronic obstructive pulmonary disease) (HCC)     Disc disorder     DM2 (diabetes mellitus, type 2) ([facility] [address]     on insulin    Fall due to stumbling 10/6/2017    GERD (gastroesophageal reflux disease)     History of blood transfusion     Hx of blood clots     Hyperlipidemia     Hypertension     Inappropriate sinus tachycardia     LVH (left ventricular hypertrophy)     moderate    Lymphadenitis, chronic 4/13/2018    Occipital neuralgia 11/2/2011    Respiratory acidosis 10/7/2017    Sinus tachycardia 2/16/2015       Past Surgical History:   Procedure Laterality Date    ANOSCOPY  10/25/2006    injection of anal Diabetes Sister        Social History:   Social History     Socioeconomic History    Marital status:      Spouse name: Not on file    Number of children: 3    Years of education: 9    Highest education level: 9th grade   Occupational History    Occupation: SSD   Social Needs    Financial resource strain: Not hard at all   Mayi[place]ust[place] insecurity     Worry: Never true     Inability: Never true   [employer] needs     Medical: Yes     Non-medical: No   Tobacco Use    Smoking status: Former Smoker     Packs/day: 1.50     Years: 25.00     Pack years: 37.50     Types: Cigarettes     Start date: 1979     Quit date: 2004     Years since quittin.0    Smokeless tobacco: Never Used    Tobacco comment: Stopped smoking 16 years ago   Substance and Sexual Activity    Alcohol use: Not Currently     Frequency: Never     Comment: caffeine: no use    Drug use: Not Currently    Sexual activity: Not Currently     Partners: Male   Lifestyle    Physical activity     Days per week: 0 days     Minutes per session: 0 min    Stress: Not at all   Relationships    Social connections     Talks on phone: More than three times a week     Gets together: More than three times a week     Attends Synagogue service: Never     Active member of club or organization: No     Attends meetings of clubs or organizations: Never     Relationship status:     Intimate partner violence     Fear of current or ex partner: Not on file     Emotionally abused: Not on file     Physically abused: Not on file     Forced sexual activity: Not on file   Other Topics Concern    Not on file   Social History Narrative    Denies caffeine. Grandson shops for her and helps around the house    Patient unable to exercise d/t mobility status     Patient lives with her  who has limited speech ability d/t hx of CVA.  He is able to assist patient with IADLS     Smoking history: The patient is a former smoker of cigarettes 1.5 packs/day x 25 years quit 2004 37.5 pack years  ETOH:   reports previous alcohol use. Exposures: There  is not history of TB or TB exposure. There is not asbestos or silica dust exposure. The patient reports does not have coal, foundry, quarry or [employer] exposure. Recent travel history none. There is not  history of recreational or IV drug use. There is not hot tub exposure. The patient does not have any exotic pets, turtles or exotic birds. Vaccines:      Immunization History   Administered Date(s) Administered    Influenza 10/26/2012    Influenza Virus Vaccine 10/08/2013, 09/04/2015    Influenza, High Dose (Fluzone 65 yrs and older) 11/07/2019    Influenza, Keyla Mustard, IM, PF (6 mo and older Fluzone, Flulaval, Fluarix, and 3 yrs and older Afluria) 10/24/2016, 12/14/2017    Pneumococcal Polysaccharide ([id number]) 09/04/2015    Zoster Live (Zostavax) 01/02/2015        Home Meds: Medications Prior to Admission: OXYGEN, Inhale 2 L into the lungs continuous  escitalopram (LEXAPRO) 20 MG tablet, Take 1 tablet by mouth daily  insulin glargine (LANTUS) 100 UNIT/ML injection vial, Inject 50 Units into the skin 2 times daily  insulin lispro (HUMALOG) 100 UNIT/ML injection vial, Inject 40 Units into the skin 3 times daily (with meals)  dilTIAZem (CARDIZEM CD) 240 MG extended release capsule, Take 1 capsule by mouth daily (Patient taking differently: Take 240 mg by mouth 2 times daily )  miconazole (MICOTIN) 2 % powder, Apply topically 2 times daily. miconazole nitrate 2 % OINT, Apply topically 2 times daily  miconazole nitrate 2 % OINT, Apply topically 3 times daily as needed (after toileting)  apixaban (ELIQUIS) 5 MG TABS tablet, Take 5 mg by mouth 2 times daily  bumetanide (BUMEX) 2 MG tablet, Take 2 mg by mouth 2 times daily  gabapentin (NEURONTIN) 300 MG capsule, Take 300 mg by mouth 3 times daily.   lansoprazole (PREVACID) 30 MG delayed release capsule, Take 30 mg by mouth 2 times daily  traZODone (DESYREL) 50 MG tablet, Take 50 mg by mouth nightly  ipratropium-albuterol (DUONEB) 0.5-2.5 (3) MG/3ML SOLN nebulizer solution, Take 3 mLs by nebulization every 6 hours as needed for Shortness of Breath  omega-3 acid ethyl esters (LOVAZA) 1 g capsule, Take 2 capsules by mouth 2 times daily  spironolactone (ALDACTONE) 25 MG tablet, Take 1 tablet by mouth daily  montelukast (SINGULAIR) 10 MG tablet, Take 1 tablet by mouth nightly  albuterol sulfate  (90 Base) MCG/ACT inhaler, Inhale 2 puffs into the lungs 4 times daily as needed for Wheezing  metoprolol succinate (TOPROL XL) 100 MG extended release tablet, Take 1 tablet by mouth daily  ferrous sulfate (IRON 325) 325 (65 Fe) MG tablet, Take 1 tablet by mouth daily (with breakfast)  acetaminophen (TYLENOL) 325 MG tablet, Take 650 mg by mouth every 6 hours as needed for Pain  vitamin D (ERGOCALCIFEROL) 1.25 MG (99939 UT) CAPS capsule, Take 50,000 Units by mouth once a week Given Monday    CURRENT MEDS :  Scheduled Meds:   insulin lispro  5 Units Subcutaneous TID WC    insulin lispro  0-18 Units Subcutaneous TID WC    insulin lispro  0-9 Units Subcutaneous Nightly    insulin glargine  60 Units Subcutaneous BID    apixaban  5 mg Oral BID    dilTIAZem  240 mg Oral Daily    metoprolol succinate  100 mg Oral Daily    escitalopram  20 mg Oral Daily    ferrous sulfate  325 mg Oral Daily with breakfast    gabapentin  300 mg Oral TID    ipratropium-albuterol  1 vial Nebulization 4x daily    montelukast  10 mg Oral Nightly    spironolactone  25 mg Oral Daily    traZODone  50 mg Oral Nightly    [START ON 1/18/2021] vitamin D  50,000 Units Oral Weekly    docusate sodium  100 mg Oral Nightly    pantoprazole  40 mg Oral QAM AC    methylPREDNISolone  40 mg Intravenous Q8H    [Held by provider] bumetanide  1 mg Intravenous Q12H       Continuous Infusions:   dextrose         Allergies   Allergen Reactions    Statins Other (See Comments)     Muscle pains REVIEW OF SYSTEMS:  Constitutional: Denies fever, weight loss, night sweats positive fatigue  Skin: Denies pigmentation, dark lesions, and rashes   HEENT: Denies hearing loss, tinnitus, ear drainage, epistaxis, sore throat, and hoarseness. Cardiovascular: Denies palpitations, chest pain, and chest pressure. Respiratory: Positive shortness of breath  Gastrointestinal: Denies nausea, vomiting, poor appetite, diarrhea, heartburn or reflux  Genitourinary: Denies dysuria, frequency, urgency or hematuria  Musculoskeletal: Denies myalgias, muscle weakness, and bone pain chronic lymphedema, venous stasis ulcers  Neurological: Denies dizziness, vertigo, headache, and focal weakness  Psychological: Denies anxiety and depression  Endocrine: Denies heat intolerance and cold intolerance  Hematopoietic/Lymphatic: Denies bleeding problems and blood transfusions    OBJECTIVE:   BP (!) 168/84   Pulse 97   Temp 97.4 °F (36.3 °C) (Temporal)   Resp 16   Ht 5' 3\" (1.6 m)   Wt 289 lb 9.6 oz (131.4 kg)   LMP  (LMP Unknown)   SpO2 98%   BMI 51.30 kg/m²   SpO2 Readings from Last 1 Encounters:   01/14/21 98%        I/O:    Intake/Output Summary (Last 24 hours) at 1/14/2021 1210  Last data filed at 1/14/2021 4801  Gross per 24 hour   Intake 840 ml   Output 1950 ml   Net -1110 ml     Vent Information  Skin Assessment: Clean, dry, & intact  SpO2: 98 %                Physical Exam:  General: The patient is lying in bed comfortably without any distress. Breathing is not labored  HEENT: Pupils are equal round and reactive to light, there are no oral lesions and no post-nasal drip   Neck: supple without adenopathy  Cardiovascular: regular rate and rhythm without murmur or gallop  Respiratory: Diminished bilaterally to auscultation bilaterally without wheezing or crackles.   Air entry is symmetric  Abdomen: soft, obese, non-tender, non-distended, normal bowel sounds  Extremities: warm, chronic lymphedema venous stasis ulcers seeping, blisters, erythema, no clubbing  Skin: no rash or lesion  Neurologic: CN II-XII grossly intact, no focal deficits    Pulmonary Function Testing   2018 Dr Gilmer Meckel  Pulmonary function test results as follows:     FEV1 / FVC was 91.  FEV1 was 0.99 L which was 43% of predicted FVC was 1.08 L which was 37% of predicted.  FEF 2575 was 1.19 L which was 58% of predicted.  Patient shows some bronchodilator response in this area.  Total lung capacity was 3.58 L which was 73% of predicted.  And diffusion capacity was 4.55 mL/m/mm mercury which was 19% of predicted but improved to 61% with correction for alveolar volume. .     Impression: Severe restrictive lung disease most likely secondary for patient's body habitus. Moderate decrement in total lung capacity.  A mild decrement in diffusion capacity.  There is some bronchodilator response in the smaller airways can which could be reflective of underlying hyperreactive airway disease.     Regine Morejon MD       Imaging personally reviewed:  1/12/2021 chest x-ray  No acute process      Abd CT lung windows 5/29/2020  Lower lung bases demonstrate areas of subpleural increased density are    discrete subpleural atelectasis impulse lower lobes.                     Echo:  2017   Summary   Mild concentric left ventricular hypertrophy. Ejection fraction is visually estimated at 55-65%. Stage II diastolic dysfunction. The left atrium is severely dilated. Elevated L atrial pressure   Mild mitral regurgitation   The aortic valve appears mildly sclerotic. Pulmonary hypertension is mild to moderate . No evidence of pericardial effusion.       Signature    Labs:  Lab Results   Component Value Date    WBC 7.9 01/12/2021    HGB 10.0 01/12/2021    HCT 32.7 01/12/2021    MCV 84.1 01/12/2021    MCH 25.7 01/12/2021    MCHC 30.6 01/12/2021    RDW 15.7 01/12/2021     01/12/2021    MPV 9.9 01/12/2021     Lab Results   Component Value Date     01/14/2021    K 5.0 01/14/2021    K 4.5 01/12/2021    CL 95 01/14/2021    CO2 37 01/14/2021    BUN 31 01/14/2021    CREATININE 0.8 01/14/2021    LABALBU 4.0 01/12/2021    LABALBU 4.5 11/02/2011    CALCIUM 9.3 01/14/2021    GFRAA >60 01/14/2021    LABGLOM >60 01/14/2021     Lab Results   Component Value Date    PROTIME 12.4 01/12/2021    INR 1.1 01/12/2021     Recent Labs     01/13/21  1857   PROBNP 1,543*     Recent Labs     01/12/21  2319 01/13/21  1640   TROPONINI <0.01 <0.01     Recent Labs     01/13/21  1857   PROCAL 0.12*     This SmartLink has not been configured with any valid records. Micro:  No results for input(s): CULTRESP in the last 72 hours. No results for input(s): LABGRAM in the last 72 hours. No results for input(s): LEGUR in the last 72 hours. No results for input(s): STREPNEUMAGU in the last 72 hours. No results for input(s): LP1UAG in the last 72 hours. Assessment:  1. Acute on chronic respiratory failure with hypoxia and hypercapnia  2. Untreated moderate SAM, previous AHI 23 , sleep study at [facility] 2018  3. Lactic acidosis  4. Chronic diastolic heart failure  5. Morbid obesity  6. Restrictive ventilatory component related to body habitus  7. Obesity hypoventilation syndrome  8. Medical noncompliance  9. Diabetes mellitus type 2, uncontrolled hyperglycemia  10. A. fib RVR  11. Chronic lymphedema    Plan:  1. Oxygen therapy 4 L nasal cannula keep greater than 92%  2. Noninvasive ventilation nocturnal and as needed during daytime AVAPS   3. Follow ABGs and chest x-ray  4. Fluid bolus 250 cc normal saline x1 dose. Trend lactic acid every 6 hours  5. Bumex on hold per primary team  6. Wound care  7. Eliquis BID. GI prophylaxis  8. Recommend outpatient PFTs and full in lab split sleep study with end-tidal CO2 monitoring. Patient has never gone for this testing previously as recommended, we will reorder prior to  Discharge. Thank you for allowing me to participate in the care of Sean Nevarez. Please feel free to call with questions. This plan of care was reviewed in collaboration with Dr. Pina Del Toro  Electronically signed by RAUL Carvalho CNP on 1/14/2021 at 12:10 PM      Note: This report was completed utilizing computer voice recognition software. Every effort has been made to ensure accuracy, however; inadvertent computerized transcription errors may be present      I personally saw, examined, and cared for the patient. Labs, medications, radiographs reviewed. I agree with history exam and plans detailed in NP note.       Electronically signed by Peter Meneses DO on 1/14/2021 at 5:12 PM

## 2021-01-14 NOTE — PROGRESS NOTES
Occupational Therapy  OCCUPATIONAL THERAPY INITIAL EVALUATION      Date:2021  Patient Name: Shelby Ayala  MRN: [id number]  : 1955  Room: [address] [place] [address]    Evaluating OT: Abdullahi Lee OTR/L #[id number]    Referring physician: Gautam Meraz DO    AM-PAC Daily Activity Raw Score:   Recommended Adaptive Equipment: TBD     Reason for Admission/Diagnosis: Atrial fibrillation with RVR   Pertinent Medical History/Surgery: HFpEF, anal fissure, anemia, anxiety, A-fib, CHF, fall, blood clots, HTN, HLD, DM type II, COPD, disc disorder, lymphadenitis, occipital neuralgia, respiratory acidosis, R LE fx with surgery, L TKA       Precautions:  Falls, tele, supplemental O2     Home Living: Pt lives with  and 2 adult grandchildren  in a 2 story house with first floor set-up and ramp to enter. Patient sleeps in lift chair and has bsc on the first floor. Bathroom setup: Patient uses bsc and completes sponge bathing while seated   Equipment owned: rollator, wheelchair, bsc  Prior Level of Function:   Per patient, I/mod I with ADLs ,  assist with IADLs. Patient's  completes home management tasks. Patient was using rollator for functional mobility short distances at home. Patient uses wheelchair for community mobility.  Patient on 2-4 L supplemental O2 at baseline  Driving: not stated                             Occupation: not employed  Leisure:not stated    Pain Level: R LE (below knee) Patient did not rate intensity  Cognition: A&O: self:yes, place:yes, time: yes, situation:yes  Follows 1-2 step directions   Memory:  good    Sequencing:  fair    Problem solving:  fair    Judgement/safety:  fair      Functional Assessment:   Initial Eval Status  Date: 21 Treatment Status  Date: Short Term Goals=LTG  Treatment frequency: PRN 1-3 x/week   Feeding Set-up  Modified [place]    Grooming Stand by Assist   Brushed hair while seated edge of bed for safety and pain management  Modified [place] UB Dressing Minimal Assist   Modified [place]    LB Dressing Dependent   Moderate Assist   Using assistive devices as needed   Bathing Maximal Assist   Moderate Assist    Toileting Maximal Assist   Minimal Assist    Bed Mobility  Supine to sit: Mod A   Sit to supine: Mod A  Supine to sit: SBA   Sit to supine: SBA   Functional Transfers Sit to stand: Min A  Stand to sit: Min A  Verbal cues for technique using fww  Mod I using fww   Functional Mobility Min A  Completed functional mobility bed<>door entry using fww   Mod I using fww  Bed<>bathroom   Balance Sitting:     Static:SBA    Dynamic:NT  Standing: CGA/ min A     Activity Tolerance Fair-  Fair+   Visual/  Perceptual Glasses: no         Safety       Fair                  Good     Upper Extremities:    Treatment Status  Date: Short Term Goals=LTG   B UE UE ROM:   B shoulder active ROM: ~0-90 degrees  B elbow-hand active ROM:WFL       Strength: B: 4-/5                     Coordination Fine Motor Coordination:  WFL      Gross Motor Coordination:   WFL                      Hand dominance: R handed    Hearing: WFL  Sensation:  No acute c/o numbness or tingling  Tone:  WFL  Edema: none observed B UE, observed B LE                            Comments: Upon arrival, patient semi-supine in bed and agreeable to session with encouragement. OT evaluation performed with education provided regarding the purpose and benefits of OT session, along with mobility and I/ADL completion. Patient demonstrates functional limitation with ADLs/ functional mobility secondary to pain, weakness and decreased activity tolerance. At end of session, patient semi-supine in bed with call light and phone within reach, along with all lines and tubes intact      Skilled monitoring of vitals-  Skilled monitoring of the patient's response throughout treatment.     SpO2: greater than 93% on 4 L O2       Patient would  benefit from continued skilled OT  to maximize functional outcomes as normalization muscle tone and facilitation active functional movement/Attention   [x] Patient/Family education to increase safety and functional independence   [x] Positioning to improve functional independence, safety, and skin integrity   []Sensory re-education techniques to improve extremity awareness, maintain skin integrity and improve hand function         []Splinting/positioning needs to maintain joint/skin integrity and prevent contractures       [x] Therapeutic Activity to improve activity tolerance for functional activities and ADLs    [x]Therapeutic Exercise to improve UE strength and activity tolerance for functional transfers and ADLs   [] Visual/Perceptual retraining to improve body awareness and safety during functional activities and ADLs   []      Rehab Potential:  Good for established goals    Patient / Family Goal: Mod  · Evaluation Complexity: Mod Complexity  ? History: Expanded review of medical records and additional review of physical, cognitive, or psychosocial history related to current functional performance  ? Exam: 5+ performance deficits  ? Assistance/Modification: mod/max assistance or modifications required to perform tasks. May have comorbidities that affect occupational performance. Evaluation Time includes thorough review of current medical information, gathering information on past medical history/social history and prior level of function, completion of standardized testing/informal observation of tasks, assessment of data and education on plan of care and goals. Patient and/or family were instructed on functional diagnosis, prognosis/goals and OT plan of care. Demonstrated fair  understanding.     Time In: 09:06  Time Out: 09:28  Total Session Time: 22 minutes  Total Treatment Time: naX    Min Units   OT Eval Low 22866       OT Eval Medium 97166 X 1   OT Eval High [id number]       OT Re-Eval [id number]       Therapeutic Ex [id number]       Therapeutic Activities 42112       ADL/Self Care 24814       Orthotic Management 44359       Neuro Re-Ed 31234       Non-Billable Time           [] Malnutrition indicators have been identified and nursing has been notified to ensure a dietitian consult is ordered.       Mod OT Evaluation     Juan Styles, OTR/L #[id number]

## 2021-01-14 NOTE — PROGRESS NOTES
trunk into/from sitting position. Pt sat at EOB at SBA level once she was assisted with scooting towards EOB. - Transfer training: maxA STS with cues for proper hand placement and sequencing. Stand pivot with front [employer]. Pt mildly unsteady and with antalgic step-to patterning when turning to sit d/t RLE pain. Cues for proper sequencing with front [employer] throughout basic transfer. - Ambulation: 40 feet with 1 turn front [employer]. Pt unsteady throughout. Tolerance limited by RLE pain. Cues for proper step-to patterning with front WW to accommodate for RLE pain properly. o Skilled repositioning in supine with HOB elevated and RLE propped on pillow for comfort, pain/edema management, and pressure relief. o Skilled vitals monitoring. o Pt education as noted above. Pt's/ family goals   1. Pt wish's to return home. Will continue to reinforce benefits of continued rehabilitation. Patient and or family understand(s) diagnosis, prognosis, and plan of care. yes    PLAN OF CARE:    Current Treatment Recommendations     [x] Strengthening     [x] ROM   [x] Balance Training   [x] Endurance Training   [x] Transfer Training   [x] Gait Training   [] Stair Training   [x] Positioning   [x] Safety and Education Training   [x] Patient/Caregiver Education   [] HEP  [] Other     PT care will be provided in accordance with the objectives noted above. The above treatment recommendations will be utilized to address deficits described above in order to restore pt's prior level of function and/or achieve modified functional independence with adaptive strategies. Frequency of treatments: 2-5x/week x 1-2 weeks.     Time in  0916  Time out  0930    Total Treatment Time  10 minutes     Evaluation Time includes thorough review of current medical information, gathering information on past medical history/social history and prior level of function, completion of standardized testing/informal observation of tasks, assessment of data and education on plan of care and goals.     CPT codes:  [] Low Complexity PT evaluation 60212  [x] Moderate Complexity PT evaluation 14819  [] High Complexity PT evaluation 59459  [] PT Re-evaluation 10273  [] Gait training 15566 0 minutes  [] Manual therapy 74305 0 minutes  [x] Therapeutic activities 74017 10 minutes  [] Therapeutic exercises 91895 0 minutes  [] Neuromuscular reeducation 96896 0 minutes     Julio Cesar Calderon, PT, DPT  [id number]

## 2021-01-15 PROBLEM — J44.1 COPD EXACERBATION (HCC): Status: ACTIVE | Noted: 2018-11-06

## 2021-01-15 LAB
ANION GAP SERPL CALCULATED.3IONS-SCNC: 8 MMOL/L (ref 7–16)
BUN BLDV-MCNC: 30 MG/DL (ref 8–23)
CALCIUM SERPL-MCNC: 9.2 MG/DL (ref 8.6–10.2)
CHLORIDE BLD-SCNC: 90 MMOL/L (ref 98–107)
CO2: 34 MMOL/L (ref 22–29)
CREAT SERPL-MCNC: 0.7 MG/DL (ref 0.5–1)
EKG ATRIAL RATE: 84 BPM
EKG P AXIS: 62 DEGREES
EKG P-R INTERVAL: 132 MS
EKG Q-T INTERVAL: 396 MS
EKG QRS DURATION: 108 MS
EKG QTC CALCULATION (BAZETT): 467 MS
EKG R AXIS: 21 DEGREES
EKG T AXIS: 49 DEGREES
EKG VENTRICULAR RATE: 84 BPM
GFR AFRICAN AMERICAN: >60
GFR NON-AFRICAN AMERICAN: >60 ML/MIN/1.73
GLUCOSE BLD-MCNC: 436 MG/DL (ref 74–99)
LACTIC ACID: 3 MMOL/L (ref 0.5–2.2)
LACTIC ACID: 3.4 MMOL/L (ref 0.5–2.2)
METER GLUCOSE: 274 MG/DL (ref 74–99)
METER GLUCOSE: 302 MG/DL (ref 74–99)
METER GLUCOSE: 331 MG/DL (ref 74–99)
METER GLUCOSE: 387 MG/DL (ref 74–99)
POTASSIUM SERPL-SCNC: 4.7 MMOL/L (ref 3.5–5)
PRO-BNP: 762 PG/ML (ref 0–125)
SODIUM BLD-SCNC: 132 MMOL/L (ref 132–146)
T4 FREE: 1.05 NG/DL (ref 0.93–1.7)
TSH SERPL DL<=0.05 MIU/L-ACNC: 0.57 UIU/ML (ref 0.27–4.2)

## 2021-01-15 PROCEDURE — 2060000000 HC ICU INTERMEDIATE R&B

## 2021-01-15 PROCEDURE — 84443 ASSAY THYROID STIM HORMONE: CPT

## 2021-01-15 PROCEDURE — 6370000000 HC RX 637 (ALT 250 FOR IP): Performed by: INTERNAL MEDICINE

## 2021-01-15 PROCEDURE — 93005 ELECTROCARDIOGRAM TRACING: CPT | Performed by: INTERNAL MEDICINE

## 2021-01-15 PROCEDURE — 80048 BASIC METABOLIC PNL TOTAL CA: CPT

## 2021-01-15 PROCEDURE — 83880 ASSAY OF NATRIURETIC PEPTIDE: CPT

## 2021-01-15 PROCEDURE — 6360000002 HC RX W HCPCS: Performed by: INTERNAL MEDICINE

## 2021-01-15 PROCEDURE — 6370000000 HC RX 637 (ALT 250 FOR IP): Performed by: FAMILY MEDICINE

## 2021-01-15 PROCEDURE — APPSS180 APP SPLIT SHARED TIME > 60 MINUTES: Performed by: NURSE PRACTITIONER

## 2021-01-15 PROCEDURE — 84439 ASSAY OF FREE THYROXINE: CPT

## 2021-01-15 PROCEDURE — 36415 COLL VENOUS BLD VENIPUNCTURE: CPT

## 2021-01-15 PROCEDURE — 99254 IP/OBS CNSLTJ NEW/EST MOD 60: CPT | Performed by: INTERNAL MEDICINE

## 2021-01-15 PROCEDURE — 83605 ASSAY OF LACTIC ACID: CPT

## 2021-01-15 PROCEDURE — 2700000000 HC OXYGEN THERAPY PER DAY

## 2021-01-15 PROCEDURE — 6370000000 HC RX 637 (ALT 250 FOR IP): Performed by: HOSPITALIST

## 2021-01-15 PROCEDURE — 94660 CPAP INITIATION&MGMT: CPT

## 2021-01-15 PROCEDURE — 2500000003 HC RX 250 WO HCPCS: Performed by: NURSE PRACTITIONER

## 2021-01-15 PROCEDURE — 82962 GLUCOSE BLOOD TEST: CPT

## 2021-01-15 PROCEDURE — 6370000000 HC RX 637 (ALT 250 FOR IP): Performed by: NURSE PRACTITIONER

## 2021-01-15 PROCEDURE — 94640 AIRWAY INHALATION TREATMENT: CPT

## 2021-01-15 RX ORDER — TRAMADOL HYDROCHLORIDE 50 MG/1
50 TABLET ORAL EVERY 4 HOURS PRN
Status: DISCONTINUED | OUTPATIENT
Start: 2021-01-15 | End: 2021-01-19 | Stop reason: HOSPADM

## 2021-01-15 RX ORDER — BUMETANIDE 0.25 MG/ML
2 INJECTION, SOLUTION INTRAMUSCULAR; INTRAVENOUS EVERY 12 HOURS
Status: DISCONTINUED | OUTPATIENT
Start: 2021-01-15 | End: 2021-01-19 | Stop reason: HOSPADM

## 2021-01-15 RX ORDER — METOPROLOL SUCCINATE 100 MG/1
100 TABLET, EXTENDED RELEASE ORAL 2 TIMES DAILY
Status: DISCONTINUED | OUTPATIENT
Start: 2021-01-15 | End: 2021-01-19 | Stop reason: HOSPADM

## 2021-01-15 RX ORDER — MINERAL OIL/HYDROPHIL PETROLAT
OINTMENT (GRAM) TOPICAL 3 TIMES DAILY PRN
Status: DISCONTINUED | OUTPATIENT
Start: 2021-01-15 | End: 2021-01-19 | Stop reason: HOSPADM

## 2021-01-15 RX ORDER — MINERAL OIL/HYDROPHIL PETROLAT
OINTMENT (GRAM) TOPICAL 2 TIMES DAILY
Status: DISCONTINUED | OUTPATIENT
Start: 2021-01-15 | End: 2021-01-19 | Stop reason: HOSPADM

## 2021-01-15 RX ORDER — INSULIN GLARGINE 100 [IU]/ML
100 INJECTION, SOLUTION SUBCUTANEOUS 2 TIMES DAILY
Status: DISCONTINUED | OUTPATIENT
Start: 2021-01-15 | End: 2021-01-19 | Stop reason: HOSPADM

## 2021-01-15 RX ORDER — DILTIAZEM HYDROCHLORIDE 5 MG/ML
10 INJECTION INTRAVENOUS ONCE
Status: DISCONTINUED | OUTPATIENT
Start: 2021-01-15 | End: 2021-01-15

## 2021-01-15 RX ORDER — INSULIN GLARGINE 100 [IU]/ML
90 INJECTION, SOLUTION SUBCUTANEOUS 2 TIMES DAILY
Status: DISCONTINUED | OUTPATIENT
Start: 2021-01-15 | End: 2021-01-15

## 2021-01-15 RX ADMIN — GABAPENTIN 300 MG: 300 CAPSULE ORAL at 20:25

## 2021-01-15 RX ADMIN — APIXABAN 5 MG: 5 TABLET, FILM COATED ORAL at 20:26

## 2021-01-15 RX ADMIN — IPRATROPIUM BROMIDE AND ALBUTEROL SULFATE 3 ML: .5; 3 SOLUTION RESPIRATORY (INHALATION) at 07:30

## 2021-01-15 RX ADMIN — METOPROLOL SUCCINATE 100 MG: 100 TABLET, EXTENDED RELEASE ORAL at 20:25

## 2021-01-15 RX ADMIN — METOPROLOL SUCCINATE 100 MG: 100 TABLET, EXTENDED RELEASE ORAL at 08:42

## 2021-01-15 RX ADMIN — TRAZODONE HYDROCHLORIDE 50 MG: 50 TABLET ORAL at 20:26

## 2021-01-15 RX ADMIN — INSULIN LISPRO 30 UNITS: 100 INJECTION, SOLUTION INTRAVENOUS; SUBCUTANEOUS at 17:30

## 2021-01-15 RX ADMIN — ACETAMINOPHEN 650 MG: 325 TABLET ORAL at 08:41

## 2021-01-15 RX ADMIN — IPRATROPIUM BROMIDE AND ALBUTEROL SULFATE 3 ML: .5; 3 SOLUTION RESPIRATORY (INHALATION) at 21:50

## 2021-01-15 RX ADMIN — LORAZEPAM 0.5 MG: 0.5 TABLET ORAL at 14:27

## 2021-01-15 RX ADMIN — SPIRONOLACTONE 25 MG: 25 TABLET ORAL at 08:42

## 2021-01-15 RX ADMIN — INSULIN LISPRO 9 UNITS: 100 INJECTION, SOLUTION INTRAVENOUS; SUBCUTANEOUS at 17:25

## 2021-01-15 RX ADMIN — Medication: at 23:06

## 2021-01-15 RX ADMIN — INSULIN GLARGINE 90 UNITS: 100 INJECTION, SOLUTION SUBCUTANEOUS at 06:06

## 2021-01-15 RX ADMIN — PANTOPRAZOLE SODIUM 40 MG: 40 TABLET, DELAYED RELEASE ORAL at 08:42

## 2021-01-15 RX ADMIN — INSULIN GLARGINE 100 UNITS: 100 INJECTION, SOLUTION SUBCUTANEOUS at 20:29

## 2021-01-15 RX ADMIN — METHYLPREDNISOLONE SODIUM SUCCINATE 40 MG: 40 INJECTION, POWDER, FOR SOLUTION INTRAMUSCULAR; INTRAVENOUS at 02:10

## 2021-01-15 RX ADMIN — DOCUSATE SODIUM 100 MG: 100 CAPSULE ORAL at 20:25

## 2021-01-15 RX ADMIN — INSULIN LISPRO 6 UNITS: 100 INJECTION, SOLUTION INTRAVENOUS; SUBCUTANEOUS at 20:30

## 2021-01-15 RX ADMIN — IPRATROPIUM BROMIDE AND ALBUTEROL SULFATE 3 ML: .5; 3 SOLUTION RESPIRATORY (INHALATION) at 12:35

## 2021-01-15 RX ADMIN — IPRATROPIUM BROMIDE AND ALBUTEROL SULFATE 3 ML: .5; 3 SOLUTION RESPIRATORY (INHALATION) at 16:17

## 2021-01-15 RX ADMIN — MICONAZOLE NITRATE: 20.6 POWDER TOPICAL at 08:41

## 2021-01-15 RX ADMIN — METHYLPREDNISOLONE SODIUM SUCCINATE 40 MG: 40 INJECTION, POWDER, FOR SOLUTION INTRAMUSCULAR; INTRAVENOUS at 08:42

## 2021-01-15 RX ADMIN — FERROUS SULFATE TAB 325 MG (65 MG ELEMENTAL FE) 325 MG: 325 (65 FE) TAB at 08:41

## 2021-01-15 RX ADMIN — INSULIN LISPRO 20 UNITS: 100 INJECTION, SOLUTION INTRAVENOUS; SUBCUTANEOUS at 11:55

## 2021-01-15 RX ADMIN — ACETAMINOPHEN 650 MG: 325 TABLET ORAL at 14:27

## 2021-01-15 RX ADMIN — MICONAZOLE NITRATE: 20.6 POWDER TOPICAL at 20:25

## 2021-01-15 RX ADMIN — PETROLATUM: 42 OINTMENT TOPICAL at 08:41

## 2021-01-15 RX ADMIN — TRAMADOL HYDROCHLORIDE 50 MG: 50 TABLET, FILM COATED ORAL at 23:13

## 2021-01-15 RX ADMIN — APIXABAN 5 MG: 5 TABLET, FILM COATED ORAL at 08:41

## 2021-01-15 RX ADMIN — INSULIN LISPRO 12 UNITS: 100 INJECTION, SOLUTION INTRAVENOUS; SUBCUTANEOUS at 11:53

## 2021-01-15 RX ADMIN — INSULIN LISPRO 15 UNITS: 100 INJECTION, SOLUTION INTRAVENOUS; SUBCUTANEOUS at 06:07

## 2021-01-15 RX ADMIN — GABAPENTIN 300 MG: 300 CAPSULE ORAL at 08:41

## 2021-01-15 RX ADMIN — MONTELUKAST SODIUM 10 MG: 10 TABLET ORAL at 20:26

## 2021-01-15 RX ADMIN — GABAPENTIN 300 MG: 300 CAPSULE ORAL at 14:27

## 2021-01-15 RX ADMIN — ESCITALOPRAM 20 MG: 10 TABLET, FILM COATED ORAL at 08:41

## 2021-01-15 RX ADMIN — METHYLPREDNISOLONE SODIUM SUCCINATE 40 MG: 40 INJECTION, POWDER, FOR SOLUTION INTRAMUSCULAR; INTRAVENOUS at 17:22

## 2021-01-15 RX ADMIN — DILTIAZEM HYDROCHLORIDE 240 MG: 240 CAPSULE, EXTENDED RELEASE ORAL at 08:41

## 2021-01-15 RX ADMIN — INSULIN LISPRO 20 UNITS: 100 INJECTION, SOLUTION INTRAVENOUS; SUBCUTANEOUS at 08:43

## 2021-01-15 RX ADMIN — BUMETANIDE 2 MG: 0.25 INJECTION INTRAMUSCULAR; INTRAVENOUS at 17:22

## 2021-01-15 RX ADMIN — ACETAMINOPHEN 650 MG: 325 TABLET ORAL at 21:45

## 2021-01-15 ASSESSMENT — PAIN SCALES - GENERAL
PAINLEVEL_OUTOF10: 0
PAINLEVEL_OUTOF10: 10
PAINLEVEL_OUTOF10: 7

## 2021-01-15 NOTE — PROGRESS NOTES
Patient's neck circumference measures at 46 cm. OP sleep lab called given fax number for information forum to be fax to unit, and filled out by patient.      Francisco Bright RN, BSN     Forum filled out, and faxed to sleep study     Francisco Bright RN, BSN

## 2021-01-15 NOTE — CONSULTS
Inpatient Cardiology Consultation      Reason for Consult:  HFpEF    Consulting Physician: Dr Ben Lee    Requesting Physician:  Dr Zaida Chawla    Date of Consultation: 1/15/2021    HISTORY OF PRESENT ILLNESS: 76 yo super morbidly obese  female known to Dr Carver last seen in office 4/2019. Also known to advanced HF last seen by Delia You APRN 1/30/2019. PMH: HTN, HLD, myalgias with Lipitor, takes Pravachol, paroxysmal AF, OAC with Eliquis, COPD on chronic O2, PFO, TIA, untreated SAM, chronic HFpEF, previously scheduled for Cardio-MEM's but was cancelled due to LE wounds, ex smoker quit in 2004,  iron deficiency anemia, T2DM insulin requiring with neuropathy, BMI 50.3, GERD, LLE DVT in remote past treated with coumadin. Reports 1 week of worsening SOB and increased O2 from 2 liters to 4 liters NC. Some fatigue but no CP, dizziness, or palpations  [facility] BP upon arrival 120/58, 's, 4 liters 94%. -->132, K+ 4.5-->4.7, Bun/Cr 20/0.8-->30/0.7, lactic acid 2.5-->4.3-->4.9-->3.4, -->436, troponin <0.01 x2, p-BNP 1543-->762, CPK 85, CKMB 2.7, LFT's WNL, HgbA1c 10.2, Hgb 10.0, WBC 7.9, INR 1.1, COVID-19 NEGATIVE  Initially received IV Bumex then it was placed on hold. Cardiology consulted for HFpEF  Patient currently resting in bed on O2 tachypneic during conversation. Please note: past medical records were reviewed per electronic medical record (EMR) - see detailed reports under Past Medical/ Surgical History. Past Medical/Surgical History:    1. BMI 50.3 on 1/15/2021  2. 37 pack years quit in 2004  3. LLE DVT in remote past and was placed on coumadin for a period of time  4. HTN  5. HLD: on Pravachol  6. Lipitor myalgias   7. Chronic iron deficiency anemia on iron  8. GERD  9. T2DM insulin neuropathy. HgbA1c 10.2 on 1/15/2021  10. Anxiety/Depression  11. Adenosine Stress test 8/2008: No stress induced ischemia, EF 54%  12.  PAF: Not on coumadin d/t Hx of GIB (2004) and chronic anemia. [id number] score at least 5: (DM, TIA, Female, HTN)  13. Questionable TIA (5/2015) with questionable PFO:  14. TTE with bubble study: 6/2/2015:  Mild asymmetric septal hypertrophy, Normal left ventricle size and systolic function, Stage II diastolic dysfunction, Patent foramen ovale with right-to-left shunt with valsalva only was visualized, Trace mitral regurgitation, RVSP is 37 mmHg. 13. Per Dr. Aleksey Reddy: (5/22/2015-office visit note)-\"I personally viewed the echo images myself now.  The images were poor & the atrial septum was not well seen. Tamar Abraham is not a definite ASD.  A bubble study was performed & was poor quality but the few bubbles that were there did not cross the atrial septum.  The septum is probably aneurysmal & & thus may have a PFO but again the visualization was too poor.  She states that Neuro was still not anum that it was a TIA. \"-Dr. Aleksey Reddy recommended a repeat JIEMNA or cardiac MRI-patient reported \"I didn't want the testing\". 16. HFpEF  17. Admitted 01/15/18 with progressive edyspnea troponin normal lFSM=875 hest xray showed b/l effusion, BNP= 814 and respiratory acidosis on ABG x2. She was given DubNebs x3, Solumedrol and placed on BiPAP. CBC showed an anemic Hemoglobin = 8.2. IV diuresed ~2.5 L. 18. TTE 1/16/2018, Dr. Parikh Avers: Olayinka Link concentric left ventricular hypertrophy. EF visually estimated at 55-65%. Stage II diastolic dysfunction. The left atrium is severely dilated. Elevated L atrial pressure. Mild mitral regurgitation. The aortic valve appears mildly sclerotic. Pulmonary hypertension is mild to moderate. No evidence of pericardial effusion. 23. Untreated moderate SAM, previous AHI 23 , sleep study at [facility] 2018  20. COPD with chronic home O2 (2-4L NC): follows with Dr. Romy Diehl. 21. Chronic Lymphedema has home health wrap her legs 3 x a week  22.  History of anal fissure s/p surgery at [facility], appendectomy, tonsillectomy, colonoscopy s./p polypectomy, L TKA, RLE fracture. GED  showing gastritis,   23. History of occipital neuralgia   24. Gait instability: uses a walker. 25. Chronically sleeps in recliner/couch        Medications Prior to admit:  Prior to Admission medications    Medication Sig Start Date End Date Taking? Authorizing Provider   OXYGEN Inhale 2 L into the lungs continuous 20  Yes Poornima Leblanc, DO   escitalopram (LEXAPRO) 20 MG tablet Take 1 tablet by mouth daily 20  Yes Poornima Leblanc DO   insulin glargine (LANTUS) 100 UNIT/ML injection vial Inject 50 Units into the skin 2 times daily 20  Yes Elo Bingham DO   insulin lispro (HUMALOG) 100 UNIT/ML injection vial Inject 40 Units into the skin 3 times daily (with meals) 20  Yes Elo Bingham DO   dilTIAZem (CARDIZEM CD) 240 MG extended release capsule Take 1 capsule by mouth daily  Patient taking differently: Take 240 mg by mouth 2 times daily  20  Yes Elo Bingham DO   miconazole (MICOTIN) 2 % powder Apply topically 2 times daily. 20  Yes Poornima Leblanc, DO   miconazole nitrate 2 % OINT Apply topically 2 times daily 20  Yes Poornima Leblanc DO   miconazole nitrate 2 % OINT Apply topically 3 times daily as needed (after toileting) 20  Yes Elo Bingham, DO   apixaban (ELIQUIS) 5 MG TABS tablet Take 5 mg by mouth 2 times daily   Yes Historical Provider, MD   bumetanide (BUMEX) 2 MG tablet Take 2 mg by mouth 2 times daily   Yes Historical Provider, MD   gabapentin (NEURONTIN) 300 MG capsule Take 300 mg by mouth 3 times daily.    Yes Historical Provider, MD   lansoprazole (PREVACID) 30 MG delayed release capsule Take 30 mg by mouth 2 times daily   Yes Historical Provider, MD   traZODone (DESYREL) 50 MG tablet Take 50 mg by mouth nightly   Yes Historical Provider, MD   ipratropium-albuterol (DUONEB) 0.5-2.5 (3) MG/3ML SOLN nebulizer solution Take 3 mLs by nebulization every 6 hours as needed for Shortness of Breath 9/30/20  Yes Diana Nuñez,    omega-3 acid ethyl esters (LOVAZA) 1 g capsule Take 2 capsules by mouth 2 times daily 9/1/20  Yes Bernarda Alvarez DO   spironolactone (ALDACTONE) 25 MG tablet Take 1 tablet by mouth daily 8/27/20  Yes Bernarda Alvarez DO   montelukast (SINGULAIR) 10 MG tablet Take 1 tablet by mouth nightly 8/27/20  Yes Bernarda Alvarez DO   albuterol sulfate  (90 Base) MCG/ACT inhaler Inhale 2 puffs into the lungs 4 times daily as needed for Wheezing 8/27/20  Yes Diana Nuñez DO   metoprolol succinate (TOPROL XL) 100 MG extended release tablet Take 1 tablet by mouth daily 8/25/20  Yes Bernarda Alvarez DO   ferrous sulfate (IRON 325) 325 (65 Fe) MG tablet Take 1 tablet by mouth daily (with breakfast) 8/25/20  Yes Bernarda Alvarez DO   acetaminophen (TYLENOL) 325 MG tablet Take 650 mg by mouth every 6 hours as needed for Pain   Yes Historical Provider, MD   vitamin D (ERGOCALCIFEROL) 1.25 MG (88963 UT) CAPS capsule Take 50,000 Units by mouth once a week Given Monday    Historical Provider, MD       Current Medications:    Current Facility-Administered Medications: insulin glargine (LANTUS) injection vial 90 Units, 90 Units, Subcutaneous, BID  insulin lispro (HUMALOG) injection vial 20 Units, 20 Units, Subcutaneous, TID WC  metoprolol succinate (TOPROL XL) extended release tablet 100 mg, 100 mg, Oral, BID  Mineral Oil-Hydrophil Petrolat OINT, , Topical, BID **AND** Mineral Oil-Hydrophil Petrolat OINT, , Topical, TID PRN  insulin lispro (HUMALOG) injection vial 0-18 Units, 0-18 Units, Subcutaneous, TID WC  insulin lispro (HUMALOG) injection vial 0-9 Units, 0-9 Units, Subcutaneous, Nightly  miconazole (MICOTIN) 2 % powder, , Topical, BID  apixaban (ELIQUIS) tablet 5 mg, 5 mg, Oral, BID  dilTIAZem (CARDIZEM CD) extended release capsule 240 mg, 240 mg, Oral, Daily  ipratropium-albuterol (DUONEB) nebulizer solution 1 ampule, 1 ampule, Inhalation, Q4H PRN  escitalopram (LEXAPRO) tablet 20 mg, 20 mg, Oral, Daily  ferrous sulfate (IRON 325) tablet 325 mg, 325 mg, Oral, Daily with breakfast  gabapentin (NEURONTIN) capsule 300 mg, 300 mg, Oral, TID  ipratropium-albuterol (DUONEB) nebulizer solution 3 mL, 1 vial, Nebulization, 4x daily  montelukast (SINGULAIR) tablet 10 mg, 10 mg, Oral, Nightly  spironolactone (ALDACTONE) tablet 25 mg, 25 mg, Oral, Daily  traZODone (DESYREL) tablet 50 mg, 50 mg, Oral, Nightly  [START ON 2021] vitamin D (ERGOCALCIFEROL) capsule 50,000 Units, 50,000 Units, Oral, Weekly  docusate sodium (COLACE) capsule 100 mg, 100 mg, Oral, Nightly  pantoprazole (PROTONIX) tablet 40 mg, 40 mg, Oral, QAM AC  glucose (GLUTOSE) 40 % oral gel 15 g, 15 g, Oral, PRN  dextrose 50 % IV solution, 12.5 g, Intravenous, PRN  glucagon (rDNA) injection 1 mg, 1 mg, Intramuscular, PRN  dextrose 5 % solution, 100 mL/hr, Intravenous, PRN  ondansetron (ZOFRAN) injection 4 mg, 4 mg, Intravenous, Q6H PRN  acetaminophen (TYLENOL) tablet 650 mg, 650 mg, Oral, Q4H PRN  methylPREDNISolone sodium (SOLU-MEDROL) injection 40 mg, 40 mg, Intravenous, Q8H  bumetanide (BUMEX) injection 1 mg, 1 mg, Intravenous, Q12H  LORazepam (ATIVAN) tablet 0.5 mg, 0.5 mg, Oral, BID PRN    Allergies:  Lipitor caused myalgias    Social History:    37 pack years quit in   No reported ETOH/Illicit Drugs  Caffeine: Coffee  Activity: Lives with  and 2 grandson's (21/24 yo) in 2 story home. Ambulates with walker inside home and wheelchair outside home. Code Status: Full Code      Family History: Mother  age 70 CHF. + tobacco. No reported CAD, CVA, PPM.  Father  late 88'M complications from DM and colon carcinoma. + Blind. 2 Sisters alive with no CAD      REVIEW OF SYSTEMS:     · Constitutional: + fatgue. Denies  fevers, chills or night sweats  · Eyes: Denies visual changes or drainage  · ENT: Denies headaches or hearing loss. No mouth sores or sore throat.  No epistaxis   · Cardiovascular: Denies chest pain, pressure or palpitations. + BLE lymphedema. · Respiratory: + MADRID. Denies Cough, orthopnea or PND. No hemoptysis   · Gastrointestinal: Denies hematemesis or anorexia. No hematochezia or melena    · Genitourinary: Denies urgency, dysuria or hematuria. · Musculoskeletal: + ambulates with walker inside home and wheelchair outside home. · Integumentary: Denies rash, hives or pruritis   · Neurological: Denies dizziness, headaches or seizures. No numbness or tingling  · Psychiatric: Denies anxiety or depression. · Endocrine: Denies temperature intolerance. No recent weight change. .  · Hematologic/Lymphatic: Denies abnormal bruising or bleeding. No swollen lymph nodes    PHYSICAL EXAM:   BP (!) 168/75   Pulse 90   Temp 97.4 °F (36.3 °C) (Temporal)   Resp 14   Ht 5' 3\" (1.6 m)   Wt 284 lb 1.6 oz (128.9 kg)   LMP  (LMP Unknown)   SpO2 95%   BMI 50.33 kg/m²   CONST:  Well developed, ill appearing  female who appears much older than stated age. Awake, alert and cooperative. No apparent distress. HEENT:   Head- Normocephalic, atraumatic   Eyes- Conjunctivae pink, anicteric  Throat- Oral mucosa pink and moist  Neck-  Thick. No stridor, trachea midline, no jugular venous distention. No carotid bruit. CHEST: Chest symmetrical and non-tender to palpation. No accessory muscle use or intercostal retractions  RESPIRATORY: Lung sounds - diminished in the bases, on 4 liters NC.   CARDIOVASCULAR:     Heart Ausculation- Regular rate and rhythm, no murmur heard. PV: + BLE lymphedema. Chronic venous stasis dermatitis/ulcers. Scabbed areas on BLE. No varicosities. Pedal pulses palpable, no clubbing or cyanosis   ABDOMEN: Soft, obese, non-tender to light palpation. Bowel sounds present. No palpable masses; no abdominal bruit  MS: + generalized weakness. Good muscle strength and tone. No atrophy or abnormal movements.    : Hansen dark jos urine  SKIN: Warm and dry no statis dermatitis or ulcers NEURO / PSYCH: Oriented to person, place and time. Speech clear and appropriate. Follows all commands. Pleasant affect     DATA:    ECG 1/12/2021 ST with frequent PAC's. LAFB  Tele strips: SR with frequent PAC's    Diagnostic:    CXR 1/13/2021:  no acute process    Intake/Output Summary (Last 24 hours) at 1/15/2021 1406  Last data filed at 1/15/2021 1321  Gross per 24 hour   Intake 720 ml   Output 2800 ml   Net -2080 ml       Labs:   CBC:   Recent Labs     01/12/21 2319   WBC 7.9   HGB 10.0*   HCT 32.7*        BMP:   Recent Labs     01/14/21  0945 01/15/21  0904    132   K 5.0 4.7   CO2 37* 34*   BUN 31* 30*   CREATININE 0.8 0.7   LABGLOM >60 >60   CALCIUM 9.3 9.2     HgA1c:   Lab Results   Component Value Date    LABA1C 10.2 (H) 01/13/2021     proBNP:   Recent Labs     01/13/21  1857 01/15/21  0904   PROBNP 1,543* 762*     PT/INR:   Recent Labs     01/12/21 2319   PROTIME 12.4   INR 1.1     APTT:  Recent Labs     01/12/21 2319   APTT 31.4     CARDIAC ENZYMES:  Recent Labs     01/12/21 2319 01/13/21  1640   CKTOTAL  --  85   CKMB  --  2.7   TROPONINI <0.01 <0.01     FASTING LIPID PANEL:  Lab Results   Component Value Date    CHOL 287 05/26/2020    HDL 33 05/26/2020    LDLCALC - 05/26/2020    TRIG 512 05/26/2020     LIVER PROFILE:  Recent Labs     01/12/21 2319   AST 13   ALT 16   LABALBU 4.0     ASSESSMENT:  1. Acute hypoxic respiratory failure  2. COPD on chronic O2  3. Acute on chronic HFpEF Negative 7.8 liters thus far. 4. Untreated moderate SAM, previous AHI 23 , sleep [facility] at [facility] 2018  5. PAF maintaining SR  6. [address] with Eliquis  7. T2DM insulin requiring poorly controlled. HgbA1c 10.2 this admission  8. BLE lymphedema   9. Lactic acidosis  10. BMI 50.3  11. Anemia  12. Unsteady gait ambulates with walker  13. L TKA  14. Medical non compliance    PLAN:  1. IV Diuresis: strict I&O, daily weights  2. Increase BB  3. Rest of cardiac medications the same  4. OOB to chair  5.  No Dr. Vieira Ax 3 to 4 weeks after hospital discharge. Thank you for the consult. Will sign off. May call if needed. Electronically signed by Ger Pizarro MD on 1/15/2021 at 3:40 PM  [facility].

## 2021-01-15 NOTE — PLAN OF CARE
Problem: Falls - Risk of:  Goal: Will remain free from falls  Description: Will remain free from falls  1/15/2021 1459 by Milena Morejon RN  Outcome: Met This Shift  1/15/2021 0412 by Saad Mercedes RN  Outcome: Met This Shift  Goal: Absence of physical injury  Description: Absence of physical injury  1/15/2021 1459 by Milena Morejon RN  Outcome: Met This Shift  1/15/2021 0412 by Saad Mercedes RN  Outcome: Met This Shift     Problem: Skin Integrity:  Goal: Will show no infection signs and symptoms  Description: Will show no infection signs and symptoms  1/15/2021 1459 by Milena Morejon RN  Outcome: Met This Shift  1/15/2021 0412 by Saad Mercedes RN  Outcome: Met This Shift  Goal: Absence of new skin breakdown  Description: Absence of new skin breakdown  1/15/2021 1459 by Milena Morejon RN  Outcome: Met This Shift  1/15/2021 0412 by Saad Mercedes RN  Outcome: Met This Shift     Problem: Pain:  Goal: Pain level will decrease  Description: Pain level will decrease  Outcome: Met This Shift  Goal: Control of acute pain  Description: Control of acute pain  Outcome: Met This Shift  Goal: Control of chronic pain  Description: Control of chronic pain  Outcome: Met This Shift

## 2021-01-15 NOTE — CARE COORDINATION
Verified oxygen script with [employer] (yesterday pt reported 2 liters continuous); per [address] with [employer] via phone [phone] most recent script is for 4 liters continuous. Pt now at baseline oxygen level with sat at 95%; continued on, iv bumex 1 mg q 12 hr, iv solu-medrol 40 mg q 8 hr; insulin sliding scale increased to high dose; last lactic acid 4.9 from 1/14, ordered q 6 hr; will check once labs are in for possible discharge; plan remains unchanged home with no needs; transportation to be set-up through [address] [place] [address] via phone [phone], subscriber # [phone]. Update; discussed with attending whom would like pt to discharge with [facility] when medically stable; pt choiced for [facility]; referral made however, please note start of care will not be until Monday; notified Rosario Huerta with [employer] pt needs home going oxygen for ambulette transport home for likely weekend discharge, they will deliver; discharging RN to notify visiting physicians via phone [phone] (so they may set-up home going follow-up, as per RD/SW note). The Plan for Transition of Care is related to the following treatment goals: medical stability    The Patient was provided with a choice of provider and agrees   with the discharge plan. [x] Yes [] No    Freedom of choice list was provided with basic dialogue that supports the patient's individualized plan of care/goals, treatment preferences and shares the quality data associated with the providers.  [x] Yes [] No

## 2021-01-15 NOTE — HOME CARE
[facility] received referral. Will follow after discharge. Spoke with patient and verified demographics. Dell Marie LPN, [facility].

## 2021-01-15 NOTE — CARE COORDINATION
Received call from [address] [place] [address] , updated her on pt. Received call from [facility] [place]  [phone], updated VPA on pt and will need to call at discharge so they can arrange a physician visit. Envelope and ambullette form in soft chart . Rod STANLEY

## 2021-01-15 NOTE — PROGRESS NOTES
Hospitalist Progress Note      PCP: Martha Buckley    Date of Admission: 1/12/2021    Chief Complaint: shortness of breath    Hospital Course: This is a 25-year-old female with past medical history of chronic diastolic CHF, COPD on chronic home oxygen; paroxysmal atrial fibrillation, diabetes mellitus, hypertension who presented to the emergency department with worsening shortness of breath, wheezing. She reports have chronic home oxygen had to be increased from 2 L/min to 4 L/min the past few days but she is still progressively got short of breath prompting presentation to the emergency department, she was in A. fib with RVR. Chest x-ray was negative for acute cardiopulmonary abnormality; SARS-CoV-2 screen was negative. Patient was admitted for COPD exacerbation. Subjective: Pt was seen and examined at bedside. No acute event overnight; reports improved shortness of breath, denies any CP or palpitation.      Medications:  Reviewed    Infusion Medications    dextrose       Scheduled Medications    insulin glargine  90 Units Subcutaneous BID    insulin lispro  20 Units Subcutaneous TID WC    metoprolol succinate  100 mg Oral BID    Mineral Oil-Hydrophil Petrolat   Topical BID    bumetanide  2 mg Intravenous Q12H    insulin lispro  0-18 Units Subcutaneous TID WC    insulin lispro  0-9 Units Subcutaneous Nightly    miconazole   Topical BID    apixaban  5 mg Oral BID    dilTIAZem  240 mg Oral Daily    escitalopram  20 mg Oral Daily    ferrous sulfate  325 mg Oral Daily with breakfast    gabapentin  300 mg Oral TID    ipratropium-albuterol  1 vial Nebulization 4x daily    montelukast  10 mg Oral Nightly    spironolactone  25 mg Oral Daily    traZODone  50 mg Oral Nightly    [START ON 1/18/2021] vitamin D  50,000 Units Oral Weekly    docusate sodium  100 mg Oral Nightly    pantoprazole  40 mg Oral QAM AC    methylPREDNISolone  40 mg Intravenous Q8H     PRN Meds: Mineral Oil-Hydrophil Petrolat **AND** Mineral Oil-Hydrophil Petrolat, ipratropium-albuterol, glucose, dextrose, glucagon (rDNA), dextrose, ondansetron, acetaminophen, LORazepam      Intake/Output Summary (Last 24 hours) at 1/15/2021 1635  Last data filed at 1/15/2021 1507  Gross per 24 hour   Intake 480 ml   Output 3275 ml   Net -2795 ml       Exam:    BP (!) 168/75   Pulse 90   Temp 97.4 °F (36.3 °C) (Temporal)   Resp 24   Ht 5' 3\" (1.6 m)   Wt 284 lb 1.6 oz (128.9 kg)   LMP  (LMP Unknown)   SpO2 97%   BMI 50.33 kg/m²     General appearance: Lying comfortably in bed. No apparent distress. Respiratory: Clear to auscultation bilaterally. Cardiovascular: Normal S1/S2. Regular rhythm and rate. Abdomen: Soft, non-tender, non-distended with normal bowel sounds. Musculoskeletal: No clubbing, cyanosis or edema bilaterally. Skin: Bilateral lower extremity lymphedema with chronic stasis dermatitis  Neurologic:  No focal deficit. Psychiatric: Alert and oriented, thought content appropriate, normal insight  Peripheral Pulses: +2 palpable, equal bilaterally       Labs:   Recent Labs     01/12/21 2319   WBC 7.9   HGB 10.0*   HCT 32.7*        Recent Labs     01/12/21 2319 01/14/21  0945 01/15/21  0904    139 132   K 4.5 5.0 4.7   CL 97* 95* 90*   CO2 31* 37* 34*   BUN 20 31* 30*   CREATININE 0.8 0.8 0.7   CALCIUM 8.9 9.3 9.2     Recent Labs     01/12/21 2319   AST 13   ALT 16   BILITOT <0.2   ALKPHOS 94     Recent Labs     01/12/21 2319   INR 1.1     Recent Labs     01/12/21 2319 01/13/21  1640   CKTOTAL  --  80   TROPONINI <0.01 <0.01       Radiology:  XR CHEST PORTABLE   Final Result   No acute process.       XR CHEST PORTABLE    (Results Pending)             Active Hospital Problems    Diagnosis Date Noted    Atrial fibrillation with RVR ([facility] [address] [I48.91] 01/13/2021    COPD exacerbation (HCC) [J44.1] 11/06/2018       Assessment  Acute on chronic respiratory failure - secondary to below; improving, now on 4 L oxygen via nasal cannula  COPD with acute exacerbation  Paroxysmal A. fib with RVR - secondary to above, rate better controlled. Now in sinus rhythm  Type 2 diabetes mellitus - poor glycemic control, exacerbated by steroid therapy  Chronic diastolic CHF - stable, appears compensated at this time  Hypertension  Hyperlipidemia  Morbid obesity - Body mass index is 50.33 kg/m².    Chronic lower extremities lymphedema    Plan:  Supplemental oxygen as needed to keep saturation above 92%; wean down FiO2 to home dose  Continue with IV Bumex, monitor I's/O, daily weight  IV Solu-Medrol, scheduled and PRN bronchodilators  Continue with Cardizem, Toprol-XL at home dose  On Eliquis  Increase Lantus to 100 units twice daily, add prandial dose of Humalog at 30 units, and continue with sliding scale to high dose   Cardiology consult      Diet: DIET CARB CONTROL; Carb Control: 4 carb choices (60 gms)/meal; Low Sodium (2 GM)  Code Status: Full Code    PT/OT Eval Status:     Dispo - Home with [facility] once stable    [facility] Gerardo Carrillo MD 1/15/2021

## 2021-01-15 NOTE — PROGRESS NOTES
Cardiology consult placed via [facility] serve to Dr Fariba Deleon.   Guzman Gamino NP taking messages

## 2021-01-15 NOTE — PROGRESS NOTES
Farida Mota M.D.,[facility]  Mor Pugh D.O., F.A.C.O.I., Micah Guevara M.D. Gabby Griffin M.D., Pina Sherman M.D. Nasrin Shea D.O. Daily Pulmonary Progress Note    Patient:  Simone Sue 77 y.o. female MRN: [id number]     Date of Service: 1/15/2021      Synopsis     We are following patient for wheezing and dyspnea    \"CC\" shortness of breath, hypersomnolence    Code status: Full      Subjective      Patient was seen and examined. Sitting up at the side of the bed more alert and awake, talking on the phone. Her oxygen is at 4 L nasal cannula. No cough or mucus production. No chest tightness or wheezing. Review of Systems:  Constitutional: Denies fever, weight loss, night sweats positive fatigue  Skin: Denies pigmentation, dark lesions, and rashes   HEENT: Denies hearing loss, tinnitus, ear drainage, epistaxis, sore throat, and hoarseness. Cardiovascular: Denies palpitations, chest pain, and chest pressure.   Respiratory: Positive shortness of breath  Gastrointestinal: Denies nausea, vomiting, poor appetite, diarrhea, heartburn or reflux  Genitourinary: Denies dysuria, frequency, urgency or hematuria  Musculoskeletal: Denies myalgias, muscle weakness, and bone pain chronic lymphedema, venous stasis ulcers  Neurological: Denies dizziness, vertigo, headache, and focal weakness  Psychological: Denies anxiety and depression  Endocrine: Denies heat intolerance and cold intolerance  Hematopoietic/Lymphatic: Denies bleeding problems and blood transfusions       24-hour events:  None    Objective   Vitals: BP (!) 168/75   Pulse 90   Temp 97.4 °F (36.3 °C) (Temporal)   Resp 24   Ht 5' 3\" (1.6 m)   Wt 284 lb 1.6 oz (128.9 kg)   LMP  (LMP Unknown)   SpO2 97%   BMI 50.33 kg/m²     I/O:    Intake/Output Summary (Last 24 hours) at 1/15/2021 1447  Last data filed at 1/15/2021 1321  Gross per 24 hour   Intake 480 ml   Output 2800 ml   Net -2320 ml       Vent Information  Skin Assessment: Clean, dry, & intact  FiO2 : 50 %  SpO2: 97 %  SpO2/FiO2 ratio: 194  Mask Type: Full face mask  Mask Size: Medium  Bonnet size: Medium          CPAP/EPAP: (S) 10 cmH2O     CURRENT MEDS :  Scheduled Meds:   insulin glargine  90 Units Subcutaneous BID    insulin lispro  20 Units Subcutaneous TID WC    metoprolol succinate  100 mg Oral BID    Mineral Oil-Hydrophil Petrolat   Topical BID    insulin lispro  0-18 Units Subcutaneous TID WC    insulin lispro  0-9 Units Subcutaneous Nightly    miconazole   Topical BID    apixaban  5 mg Oral BID    dilTIAZem  240 mg Oral Daily    escitalopram  20 mg Oral Daily    ferrous sulfate  325 mg Oral Daily with breakfast    gabapentin  300 mg Oral TID    ipratropium-albuterol  1 vial Nebulization 4x daily    montelukast  10 mg Oral Nightly    spironolactone  25 mg Oral Daily    traZODone  50 mg Oral Nightly    [START ON 1/18/2021] vitamin D  50,000 Units Oral Weekly    docusate sodium  100 mg Oral Nightly    pantoprazole  40 mg Oral QAM AC    methylPREDNISolone  40 mg Intravenous Q8H    bumetanide  1 mg Intravenous Q12H       Physical Exam:  General Appearance: appears comfortable in no acute distress. HEENT: Normocephalic atraumatic without obvious abnormality   Neck: Lips, mucosa, and tongue normal.  Supple, symmetrical, trachea midline, no adenopathy;thyroid:  no enlargement/tenderness/nodules or JVD. Lung: Breath sounds diminished bilaterally respirations   unlabored. Symmetrical expansion. Heart: RRR, normal S1, S2. No MRG  Abdomen: Soft, NT, ND. BS present x 4 quadrants. No bruit or organomegaly. Extremities: Pedal pulses 2+ symmetric b/l. Extremities normal, no cyanosis, clubbing, or edema. Musculokeletal: No joint swelling, no muscle tenderness. ROM normal in all joints of extremities. Neurologic: Mental status: Alert and Oriented X3 .     Pertinent/ New Labs and Imaging Studies     [facility] [place] 2018  3. Lactic acidosis  4. Chronic diastolic heart failure  5. Morbid obesity  6. Restrictive ventilatory component related to body habitus  7. Obesity hypoventilation syndrome  8. Medical noncompliance  9. Diabetes mellitus type 2, uncontrolled hyperglycemia  10. A. fib RVR  11. Chronic lymphedema    Plan:   12. Oxygen therapy 4 L nasal cannula keep greater than 92%  13. Noninvasive ventilation nocturnal and as needed during daytime AVAPS   14. Follow ABGs and chest x-ray. 7.35/59/78/32/5.9/94% on 4 L nasal cannula  15. Trend lactic acid every 6 hours down to 3.0  16. Bumex on hold per primary team  17. Wound care  18. Eliquis BID. GI prophylaxis  19. Recommend outpatient PFTs and full in lab split sleep study with end-tidal CO2 monitoring. Patient has never gone for this testing previously as recommended, we will reorder prior to  Discharge. This plan of care was reviewed in collaboration with Dr. Meghana Robb  Electronically signed by RAUL Fernandez CNP on 1/15/2021 at 2:47 PM        I personally saw, examined, and cared for the patient. Labs, medications, radiographs reviewed. I agree with history exam and plans detailed in NP note.       Electronically signed by Monserrat Alvarado DO on 1/15/2021 at 5:01 PM

## 2021-01-16 ENCOUNTER — APPOINTMENT (OUTPATIENT)
Dept: GENERAL RADIOLOGY | Age: 66
DRG: 140 | End: 2021-01-16
Payer: COMMERCIAL

## 2021-01-16 LAB
ANION GAP SERPL CALCULATED.3IONS-SCNC: 14 MMOL/L (ref 7–16)
BUN BLDV-MCNC: 34 MG/DL (ref 8–23)
CALCIUM SERPL-MCNC: 9.8 MG/DL (ref 8.6–10.2)
CHLORIDE BLD-SCNC: 90 MMOL/L (ref 98–107)
CHOLESTEROL, TOTAL: 355 MG/DL (ref 0–199)
CO2: 34 MMOL/L (ref 22–29)
CREAT SERPL-MCNC: 0.8 MG/DL (ref 0.5–1)
GFR AFRICAN AMERICAN: >60
GFR NON-AFRICAN AMERICAN: >60 ML/MIN/1.73
GLUCOSE BLD-MCNC: 269 MG/DL (ref 74–99)
HDLC SERPL-MCNC: 52 MG/DL
LDL CHOLESTEROL CALCULATED: 243 MG/DL (ref 0–99)
MAGNESIUM: 2.1 MG/DL (ref 1.6–2.6)
METER GLUCOSE: 264 MG/DL (ref 74–99)
METER GLUCOSE: 269 MG/DL (ref 74–99)
METER GLUCOSE: 283 MG/DL (ref 74–99)
METER GLUCOSE: 304 MG/DL (ref 74–99)
POTASSIUM SERPL-SCNC: 4.5 MMOL/L (ref 3.5–5)
SODIUM BLD-SCNC: 138 MMOL/L (ref 132–146)
TRIGL SERPL-MCNC: 298 MG/DL (ref 0–149)
VLDLC SERPL CALC-MCNC: 60 MG/DL

## 2021-01-16 PROCEDURE — 2700000000 HC OXYGEN THERAPY PER DAY

## 2021-01-16 PROCEDURE — 80061 LIPID PANEL: CPT

## 2021-01-16 PROCEDURE — 83735 ASSAY OF MAGNESIUM: CPT

## 2021-01-16 PROCEDURE — 80048 BASIC METABOLIC PNL TOTAL CA: CPT

## 2021-01-16 PROCEDURE — 6370000000 HC RX 637 (ALT 250 FOR IP): Performed by: INTERNAL MEDICINE

## 2021-01-16 PROCEDURE — 36415 COLL VENOUS BLD VENIPUNCTURE: CPT

## 2021-01-16 PROCEDURE — 2500000003 HC RX 250 WO HCPCS: Performed by: NURSE PRACTITIONER

## 2021-01-16 PROCEDURE — 71045 X-RAY EXAM CHEST 1 VIEW: CPT

## 2021-01-16 PROCEDURE — 2060000000 HC ICU INTERMEDIATE R&B

## 2021-01-16 PROCEDURE — 6370000000 HC RX 637 (ALT 250 FOR IP): Performed by: NURSE PRACTITIONER

## 2021-01-16 PROCEDURE — 82962 GLUCOSE BLOOD TEST: CPT

## 2021-01-16 PROCEDURE — 6370000000 HC RX 637 (ALT 250 FOR IP): Performed by: FAMILY MEDICINE

## 2021-01-16 PROCEDURE — 6370000000 HC RX 637 (ALT 250 FOR IP): Performed by: HOSPITALIST

## 2021-01-16 PROCEDURE — 94660 CPAP INITIATION&MGMT: CPT

## 2021-01-16 PROCEDURE — 6360000002 HC RX W HCPCS: Performed by: INTERNAL MEDICINE

## 2021-01-16 PROCEDURE — 94640 AIRWAY INHALATION TREATMENT: CPT

## 2021-01-16 RX ADMIN — MICONAZOLE NITRATE: 20.6 POWDER TOPICAL at 20:01

## 2021-01-16 RX ADMIN — TRAZODONE HYDROCHLORIDE 50 MG: 50 TABLET ORAL at 19:57

## 2021-01-16 RX ADMIN — SPIRONOLACTONE 25 MG: 25 TABLET ORAL at 08:20

## 2021-01-16 RX ADMIN — GABAPENTIN 300 MG: 300 CAPSULE ORAL at 08:21

## 2021-01-16 RX ADMIN — INSULIN LISPRO 9 UNITS: 100 INJECTION, SOLUTION INTRAVENOUS; SUBCUTANEOUS at 06:09

## 2021-01-16 RX ADMIN — IPRATROPIUM BROMIDE AND ALBUTEROL SULFATE 3 ML: .5; 3 SOLUTION RESPIRATORY (INHALATION) at 12:20

## 2021-01-16 RX ADMIN — INSULIN LISPRO 9 UNITS: 100 INJECTION, SOLUTION INTRAVENOUS; SUBCUTANEOUS at 16:51

## 2021-01-16 RX ADMIN — GABAPENTIN 300 MG: 300 CAPSULE ORAL at 19:57

## 2021-01-16 RX ADMIN — INSULIN LISPRO 30 UNITS: 100 INJECTION, SOLUTION INTRAVENOUS; SUBCUTANEOUS at 11:26

## 2021-01-16 RX ADMIN — Medication: at 08:21

## 2021-01-16 RX ADMIN — PANTOPRAZOLE SODIUM 40 MG: 40 TABLET, DELAYED RELEASE ORAL at 05:27

## 2021-01-16 RX ADMIN — MONTELUKAST SODIUM 10 MG: 10 TABLET ORAL at 19:57

## 2021-01-16 RX ADMIN — MICONAZOLE NITRATE: 20.6 POWDER TOPICAL at 08:22

## 2021-01-16 RX ADMIN — METHYLPREDNISOLONE SODIUM SUCCINATE 40 MG: 40 INJECTION, POWDER, FOR SOLUTION INTRAMUSCULAR; INTRAVENOUS at 02:06

## 2021-01-16 RX ADMIN — TRAMADOL HYDROCHLORIDE 50 MG: 50 TABLET, FILM COATED ORAL at 14:18

## 2021-01-16 RX ADMIN — IPRATROPIUM BROMIDE AND ALBUTEROL SULFATE 3 ML: .5; 3 SOLUTION RESPIRATORY (INHALATION) at 15:40

## 2021-01-16 RX ADMIN — INSULIN GLARGINE 100 UNITS: 100 INJECTION, SOLUTION SUBCUTANEOUS at 19:58

## 2021-01-16 RX ADMIN — Medication: at 20:02

## 2021-01-16 RX ADMIN — FERROUS SULFATE TAB 325 MG (65 MG ELEMENTAL FE) 325 MG: 325 (65 FE) TAB at 08:20

## 2021-01-16 RX ADMIN — ESCITALOPRAM 20 MG: 10 TABLET, FILM COATED ORAL at 08:20

## 2021-01-16 RX ADMIN — BUMETANIDE 2 MG: 0.25 INJECTION INTRAMUSCULAR; INTRAVENOUS at 05:27

## 2021-01-16 RX ADMIN — TRAMADOL HYDROCHLORIDE 50 MG: 50 TABLET, FILM COATED ORAL at 19:58

## 2021-01-16 RX ADMIN — IPRATROPIUM BROMIDE AND ALBUTEROL SULFATE 3 ML: .5; 3 SOLUTION RESPIRATORY (INHALATION) at 19:30

## 2021-01-16 RX ADMIN — IPRATROPIUM BROMIDE AND ALBUTEROL SULFATE 3 ML: .5; 3 SOLUTION RESPIRATORY (INHALATION) at 08:35

## 2021-01-16 RX ADMIN — DILTIAZEM HYDROCHLORIDE 240 MG: 240 CAPSULE, EXTENDED RELEASE ORAL at 08:20

## 2021-01-16 RX ADMIN — METHYLPREDNISOLONE SODIUM SUCCINATE 40 MG: 40 INJECTION, POWDER, FOR SOLUTION INTRAMUSCULAR; INTRAVENOUS at 17:11

## 2021-01-16 RX ADMIN — INSULIN LISPRO 9 UNITS: 100 INJECTION, SOLUTION INTRAVENOUS; SUBCUTANEOUS at 11:26

## 2021-01-16 RX ADMIN — METOPROLOL SUCCINATE 100 MG: 100 TABLET, EXTENDED RELEASE ORAL at 19:58

## 2021-01-16 RX ADMIN — INSULIN LISPRO 6 UNITS: 100 INJECTION, SOLUTION INTRAVENOUS; SUBCUTANEOUS at 19:58

## 2021-01-16 RX ADMIN — INSULIN LISPRO 30 UNITS: 100 INJECTION, SOLUTION INTRAVENOUS; SUBCUTANEOUS at 17:18

## 2021-01-16 RX ADMIN — APIXABAN 5 MG: 5 TABLET, FILM COATED ORAL at 08:21

## 2021-01-16 RX ADMIN — GABAPENTIN 300 MG: 300 CAPSULE ORAL at 13:29

## 2021-01-16 RX ADMIN — METHYLPREDNISOLONE SODIUM SUCCINATE 40 MG: 40 INJECTION, POWDER, FOR SOLUTION INTRAMUSCULAR; INTRAVENOUS at 08:38

## 2021-01-16 RX ADMIN — INSULIN LISPRO 30 UNITS: 100 INJECTION, SOLUTION INTRAVENOUS; SUBCUTANEOUS at 06:08

## 2021-01-16 RX ADMIN — BUMETANIDE 2 MG: 0.25 INJECTION INTRAMUSCULAR; INTRAVENOUS at 17:10

## 2021-01-16 RX ADMIN — INSULIN GLARGINE 100 UNITS: 100 INJECTION, SOLUTION SUBCUTANEOUS at 06:08

## 2021-01-16 RX ADMIN — APIXABAN 5 MG: 5 TABLET, FILM COATED ORAL at 19:57

## 2021-01-16 RX ADMIN — DOCUSATE SODIUM 100 MG: 100 CAPSULE ORAL at 19:57

## 2021-01-16 RX ADMIN — METOPROLOL SUCCINATE 100 MG: 100 TABLET, EXTENDED RELEASE ORAL at 08:20

## 2021-01-16 ASSESSMENT — PAIN SCALES - GENERAL
PAINLEVEL_OUTOF10: 7
PAINLEVEL_OUTOF10: 7
PAINLEVEL_OUTOF10: 0

## 2021-01-16 NOTE — PROGRESS NOTES
Hospitalist Progress Note      PCP: Tahir Avalos    Date of Admission: 1/12/2021    Chief Complaint: shortness of breath    Hospital Course: This is a 57-year-old female with past medical history of chronic diastolic CHF, COPD on chronic home oxygen; paroxysmal atrial fibrillation, diabetes mellitus, hypertension who presented to the emergency department with worsening shortness of breath, wheezing. She reports have chronic home oxygen had to be increased from 2 L/min to 4 L/min the past few days but she is still progressively got short of breath prompting presentation to the emergency department, she was in A. fib with RVR. Chest x-ray was negative for acute cardiopulmonary abnormality; SARS-CoV-2 screen was negative. Patient was admitted for COPD exacerbation. Subjective: Pt was seen and examined at bedside. No acute event overnight; reports feeling very fatigued. SOB feels ok today.       Medications:  Reviewed    Infusion Medications    dextrose       Scheduled Medications    metoprolol succinate  100 mg Oral BID    Mineral Oil-Hydrophil Petrolat   Topical BID    bumetanide  2 mg Intravenous Q12H    insulin glargine  100 Units Subcutaneous BID    insulin lispro  30 Units Subcutaneous TID WC    insulin lispro  0-18 Units Subcutaneous TID WC    insulin lispro  0-9 Units Subcutaneous Nightly    miconazole   Topical BID    apixaban  5 mg Oral BID    dilTIAZem  240 mg Oral Daily    escitalopram  20 mg Oral Daily    ferrous sulfate  325 mg Oral Daily with breakfast    gabapentin  300 mg Oral TID    ipratropium-albuterol  1 vial Nebulization 4x daily    montelukast  10 mg Oral Nightly    spironolactone  25 mg Oral Daily    traZODone  50 mg Oral Nightly    [START ON 1/18/2021] vitamin D  50,000 Units Oral Weekly    docusate sodium  100 mg Oral Nightly    pantoprazole  40 mg Oral QAM AC    methylPREDNISolone  40 mg Intravenous Q8H     PRN Meds: Mineral Oil-Hydrophil Petrolat **AND** Mineral Oil-Hydrophil Petrolat, traMADol, ipratropium-albuterol, glucose, dextrose, glucagon (rDNA), dextrose, ondansetron, acetaminophen, LORazepam      Intake/Output Summary (Last 24 hours) at 1/16/2021 0916  Last data filed at 1/16/2021 0831  Gross per 24 hour   Intake 780 ml   Output 9075 ml   Net -8295 ml       Exam:    BP (!) 151/68   Pulse 74   Temp 97.7 °F (36.5 °C) (Temporal)   Resp 20   Ht 5' 3\" (1.6 m)   Wt 284 lb 1.6 oz (128.9 kg)   LMP  (LMP Unknown)   SpO2 97%   BMI 50.33 kg/m²     General appearance: Lying comfortably in bed sleeping. No apparent distress. Respiratory: Clear to auscultation bilaterally. Cardiovascular: Normal S1/S2. Regular rhythm and rate. Abdomen: Soft, non-tender, non-distended with normal bowel sounds. Musculoskeletal: No clubbing, cyanosis. B/L leg edema. Skin: Bilateral lower extremity lymphedema with chronic stasis dermatitis  Neurologic:  No focal deficit. Sleeping but easily arousable to voice. Psychiatric: Alert and oriented, thought content appropriate, normal insight  Peripheral Pulses: +2 palpable, equal bilaterally       Labs:   No results for input(s): WBC, HGB, HCT, PLT in the last 72 hours. Recent Labs     01/14/21  0945 01/15/21  0904 01/16/21  0614    132 138   K 5.0 4.7 4.5   CL 95* 90* 90*   CO2 37* 34* 34*   BUN 31* 30* 34*   CREATININE 0.8 0.7 0.8   CALCIUM 9.3 9.2 9.8     No results for input(s): AST, ALT, BILIDIR, BILITOT, ALKPHOS in the last 72 hours. No results for input(s): INR in the last 72 hours. Recent Labs     01/13/21  1640   CKTOTAL 80   TROPONINI <0.01       Radiology:  XR CHEST PORTABLE   Final Result   Shallow lung volumes with bibasilar airspace opacities which may be on the   basis of atelectasis. Multifocal pneumonia or pulmonary edema could have a   similar appearance. XR CHEST PORTABLE   Final Result   No acute process.                 Active Hospital Problems    Diagnosis Date Noted    Atrial fibrillation with RVR ([facility] [I48.91] 01/13/2021    COPD exacerbation ([facility] [J44.1] 11/06/2018       Assessment  Acute on chronic respiratory failure - secondary to below; improving, now on 4 L oxygen via nasal cannula  COPD with acute exacerbation  Paroxysmal A. fib with RVR - secondary to above, rate better controlled. Now in sinus rhythm  Type 2 diabetes mellitus - poor glycemic control, exacerbated by steroid therapy  Chronic diastolic CHF - stable, appears compensated at this time  Hypertension  Hyperlipidemia  Morbid obesity - Body mass index is 50.33 kg/m².    Chronic lower extremities lymphedema    Plan:  Supplemental oxygen as needed to keep saturation above 92%; on 4L now  Continue with IV Bumex, monitor I's/O, daily weight  IV Solu-Medrol, scheduled and PRN bronchodilators  On Eliquis  Increase Lantus to 100 units twice daily, add prandial dose of Humalog at 30 units, and continue with sliding scale high dose   Cardiology consult, continue Bumex, increased toprol to BID, consider d/c cardizem to see if that improves lymphedema  Low-salt diet  Pulmonolgy recommends following up for outpatient PFTs and in lab sleep study, patient did not follow recommendation for this previously  Weight loss recommended    Diet: DIET CARB CONTROL; Carb Control: 4 carb choices (60 gms)/meal; Low Sodium (2 GM)  Code Status: Full Code    PT/OT Eval Status:     Dispo - Home with [facility] once stable, possibly 1/17    Kandice Griffith DO

## 2021-01-16 NOTE — ACP (ADVANCE CARE PLANNING)
ADVANCED CARE PLANNING    Patient Name: Bernice Holm       YOB: 1955              MRN:    [id number]  Admission Date:  1/12/2021 10:35 PM      Active Diagnoses: Active Problems:    COPD exacerbation (HCC)    Atrial fibrillation with RVR (HCC)  Resolved Problems:    * No resolved hospital problems. *      These active diagnoses are of sufficient risk that focused discussion on advanced care planning is indicated in order to allow the patient to thoughtfully consider personal goals of care; and, if situations arise that prevent the patient to personally give input, to ensure appropriate representation of their personal desire for different levels and levels of care. Person(s) present in discussion: [address] [facility], ,    Discussion: I reviewed her admission for the above diagnoses and her desires for ongoing aggresive care, including potential intubation and mechanical ventilation; also discussed who would speak on her behalf should she be unable to do so and discussed what conversations she has had with her family so they understand her desires if such a situation occurred now or in the future. Patient states she is ok with intubation, and would want CPR done if she experienced cardiac arrest.  Discussed possible risk of anoxic brain injury if she experienced cardiopulmonary arrest.  Patient states she is willing to take that risk and would want everything possible done to extend her life. PLAN:  Code Status:  At this time patient wishes to be Full Code      Time Spent on Advanced Planning Documents: 18 minutes    [address]

## 2021-01-17 LAB
ANION GAP SERPL CALCULATED.3IONS-SCNC: 14 MMOL/L (ref 7–16)
BASOPHILS ABSOLUTE: 0.01 E9/L (ref 0–0.2)
BASOPHILS RELATIVE PERCENT: 0.1 % (ref 0–2)
BUN BLDV-MCNC: 41 MG/DL (ref 8–23)
CALCIUM SERPL-MCNC: 9.3 MG/DL (ref 8.6–10.2)
CHLORIDE BLD-SCNC: 86 MMOL/L (ref 98–107)
CO2: 36 MMOL/L (ref 22–29)
CREAT SERPL-MCNC: 0.8 MG/DL (ref 0.5–1)
EOSINOPHILS ABSOLUTE: 0.01 E9/L (ref 0.05–0.5)
EOSINOPHILS RELATIVE PERCENT: 0.1 % (ref 0–6)
GFR AFRICAN AMERICAN: >60
GFR NON-AFRICAN AMERICAN: >60 ML/MIN/1.73
GLUCOSE BLD-MCNC: 240 MG/DL (ref 74–99)
HCT VFR BLD CALC: 36.3 % (ref 34–48)
HEMOGLOBIN: 11.3 G/DL (ref 11.5–15.5)
IMMATURE GRANULOCYTES #: 0.09 E9/L
IMMATURE GRANULOCYTES %: 1.1 % (ref 0–5)
LYMPHOCYTES ABSOLUTE: 0.97 E9/L (ref 1.5–4)
LYMPHOCYTES RELATIVE PERCENT: 12 % (ref 20–42)
MCH RBC QN AUTO: 25.5 PG (ref 26–35)
MCHC RBC AUTO-ENTMCNC: 31.1 % (ref 32–34.5)
MCV RBC AUTO: 81.9 FL (ref 80–99.9)
METER GLUCOSE: 199 MG/DL (ref 74–99)
METER GLUCOSE: 224 MG/DL (ref 74–99)
METER GLUCOSE: 264 MG/DL (ref 74–99)
METER GLUCOSE: 274 MG/DL (ref 74–99)
MONOCYTES ABSOLUTE: 0.44 E9/L (ref 0.1–0.95)
MONOCYTES RELATIVE PERCENT: 5.4 % (ref 2–12)
NEUTROPHILS ABSOLUTE: 6.57 E9/L (ref 1.8–7.3)
NEUTROPHILS RELATIVE PERCENT: 81.3 % (ref 43–80)
PDW BLD-RTO: 14.9 FL (ref 11.5–15)
PLATELET # BLD: 246 E9/L (ref 130–450)
PMV BLD AUTO: 9.3 FL (ref 7–12)
POTASSIUM SERPL-SCNC: 4.3 MMOL/L (ref 3.5–5)
RBC # BLD: 4.43 E12/L (ref 3.5–5.5)
SODIUM BLD-SCNC: 136 MMOL/L (ref 132–146)
WBC # BLD: 8.1 E9/L (ref 4.5–11.5)

## 2021-01-17 PROCEDURE — 6360000002 HC RX W HCPCS: Performed by: INTERNAL MEDICINE

## 2021-01-17 PROCEDURE — 6370000000 HC RX 637 (ALT 250 FOR IP): Performed by: FAMILY MEDICINE

## 2021-01-17 PROCEDURE — 6370000000 HC RX 637 (ALT 250 FOR IP): Performed by: NURSE PRACTITIONER

## 2021-01-17 PROCEDURE — 94640 AIRWAY INHALATION TREATMENT: CPT

## 2021-01-17 PROCEDURE — 85025 COMPLETE CBC W/AUTO DIFF WBC: CPT

## 2021-01-17 PROCEDURE — 6370000000 HC RX 637 (ALT 250 FOR IP): Performed by: INTERNAL MEDICINE

## 2021-01-17 PROCEDURE — 2700000000 HC OXYGEN THERAPY PER DAY

## 2021-01-17 PROCEDURE — 6370000000 HC RX 637 (ALT 250 FOR IP): Performed by: HOSPITALIST

## 2021-01-17 PROCEDURE — 82962 GLUCOSE BLOOD TEST: CPT

## 2021-01-17 PROCEDURE — 94660 CPAP INITIATION&MGMT: CPT

## 2021-01-17 PROCEDURE — 80048 BASIC METABOLIC PNL TOTAL CA: CPT

## 2021-01-17 PROCEDURE — 36415 COLL VENOUS BLD VENIPUNCTURE: CPT

## 2021-01-17 PROCEDURE — 2500000003 HC RX 250 WO HCPCS: Performed by: NURSE PRACTITIONER

## 2021-01-17 PROCEDURE — 2060000000 HC ICU INTERMEDIATE R&B

## 2021-01-17 RX ADMIN — PANTOPRAZOLE SODIUM 40 MG: 40 TABLET, DELAYED RELEASE ORAL at 06:33

## 2021-01-17 RX ADMIN — SPIRONOLACTONE 25 MG: 25 TABLET ORAL at 09:52

## 2021-01-17 RX ADMIN — INSULIN GLARGINE 100 UNITS: 100 INJECTION, SOLUTION SUBCUTANEOUS at 06:35

## 2021-01-17 RX ADMIN — INSULIN LISPRO 30 UNITS: 100 INJECTION, SOLUTION INTRAVENOUS; SUBCUTANEOUS at 17:34

## 2021-01-17 RX ADMIN — IPRATROPIUM BROMIDE AND ALBUTEROL SULFATE 3 ML: .5; 3 SOLUTION RESPIRATORY (INHALATION) at 22:36

## 2021-01-17 RX ADMIN — TRAMADOL HYDROCHLORIDE 50 MG: 50 TABLET, FILM COATED ORAL at 21:42

## 2021-01-17 RX ADMIN — TRAMADOL HYDROCHLORIDE 50 MG: 50 TABLET, FILM COATED ORAL at 17:36

## 2021-01-17 RX ADMIN — FERROUS SULFATE TAB 325 MG (65 MG ELEMENTAL FE) 325 MG: 325 (65 FE) TAB at 09:52

## 2021-01-17 RX ADMIN — INSULIN LISPRO 3 UNITS: 100 INJECTION, SOLUTION INTRAVENOUS; SUBCUTANEOUS at 17:34

## 2021-01-17 RX ADMIN — BUMETANIDE 2 MG: 0.25 INJECTION INTRAMUSCULAR; INTRAVENOUS at 17:38

## 2021-01-17 RX ADMIN — DILTIAZEM HYDROCHLORIDE 240 MG: 240 CAPSULE, EXTENDED RELEASE ORAL at 09:51

## 2021-01-17 RX ADMIN — MICONAZOLE NITRATE: 20.6 POWDER TOPICAL at 21:39

## 2021-01-17 RX ADMIN — GABAPENTIN 300 MG: 300 CAPSULE ORAL at 14:54

## 2021-01-17 RX ADMIN — APIXABAN 5 MG: 5 TABLET, FILM COATED ORAL at 09:51

## 2021-01-17 RX ADMIN — GABAPENTIN 300 MG: 300 CAPSULE ORAL at 21:31

## 2021-01-17 RX ADMIN — METHYLPREDNISOLONE SODIUM SUCCINATE 40 MG: 40 INJECTION, POWDER, FOR SOLUTION INTRAMUSCULAR; INTRAVENOUS at 02:12

## 2021-01-17 RX ADMIN — METOPROLOL SUCCINATE 100 MG: 100 TABLET, EXTENDED RELEASE ORAL at 09:52

## 2021-01-17 RX ADMIN — MONTELUKAST SODIUM 10 MG: 10 TABLET ORAL at 21:31

## 2021-01-17 RX ADMIN — INSULIN LISPRO 6 UNITS: 100 INJECTION, SOLUTION INTRAVENOUS; SUBCUTANEOUS at 11:21

## 2021-01-17 RX ADMIN — APIXABAN 5 MG: 5 TABLET, FILM COATED ORAL at 21:31

## 2021-01-17 RX ADMIN — METHYLPREDNISOLONE SODIUM SUCCINATE 40 MG: 40 INJECTION, POWDER, FOR SOLUTION INTRAMUSCULAR; INTRAVENOUS at 17:36

## 2021-01-17 RX ADMIN — INSULIN LISPRO 30 UNITS: 100 INJECTION, SOLUTION INTRAVENOUS; SUBCUTANEOUS at 06:38

## 2021-01-17 RX ADMIN — INSULIN LISPRO 5 UNITS: 100 INJECTION, SOLUTION INTRAVENOUS; SUBCUTANEOUS at 21:33

## 2021-01-17 RX ADMIN — INSULIN LISPRO 30 UNITS: 100 INJECTION, SOLUTION INTRAVENOUS; SUBCUTANEOUS at 11:22

## 2021-01-17 RX ADMIN — MICONAZOLE NITRATE: 20.6 POWDER TOPICAL at 09:57

## 2021-01-17 RX ADMIN — BUMETANIDE 2 MG: 0.25 INJECTION INTRAMUSCULAR; INTRAVENOUS at 04:41

## 2021-01-17 RX ADMIN — INSULIN LISPRO 9 UNITS: 100 INJECTION, SOLUTION INTRAVENOUS; SUBCUTANEOUS at 06:36

## 2021-01-17 RX ADMIN — TRAMADOL HYDROCHLORIDE 50 MG: 50 TABLET, FILM COATED ORAL at 00:02

## 2021-01-17 RX ADMIN — IPRATROPIUM BROMIDE AND ALBUTEROL SULFATE 3 ML: .5; 3 SOLUTION RESPIRATORY (INHALATION) at 15:56

## 2021-01-17 RX ADMIN — IPRATROPIUM BROMIDE AND ALBUTEROL SULFATE 3 ML: .5; 3 SOLUTION RESPIRATORY (INHALATION) at 08:19

## 2021-01-17 RX ADMIN — METOPROLOL SUCCINATE 100 MG: 100 TABLET, EXTENDED RELEASE ORAL at 21:31

## 2021-01-17 RX ADMIN — INSULIN GLARGINE 100 UNITS: 100 INJECTION, SOLUTION SUBCUTANEOUS at 21:31

## 2021-01-17 RX ADMIN — METHYLPREDNISOLONE SODIUM SUCCINATE 40 MG: 40 INJECTION, POWDER, FOR SOLUTION INTRAMUSCULAR; INTRAVENOUS at 09:52

## 2021-01-17 RX ADMIN — TRAMADOL HYDROCHLORIDE 50 MG: 50 TABLET, FILM COATED ORAL at 04:41

## 2021-01-17 RX ADMIN — GABAPENTIN 300 MG: 300 CAPSULE ORAL at 09:52

## 2021-01-17 RX ADMIN — TRAZODONE HYDROCHLORIDE 50 MG: 50 TABLET ORAL at 21:31

## 2021-01-17 RX ADMIN — Medication: at 09:58

## 2021-01-17 RX ADMIN — DOCUSATE SODIUM 100 MG: 100 CAPSULE ORAL at 21:31

## 2021-01-17 RX ADMIN — Medication: at 21:39

## 2021-01-17 RX ADMIN — IPRATROPIUM BROMIDE AND ALBUTEROL SULFATE 3 ML: .5; 3 SOLUTION RESPIRATORY (INHALATION) at 12:34

## 2021-01-17 RX ADMIN — ESCITALOPRAM 20 MG: 10 TABLET, FILM COATED ORAL at 09:51

## 2021-01-17 ASSESSMENT — PAIN DESCRIPTION - PROGRESSION: CLINICAL_PROGRESSION: NOT CHANGED

## 2021-01-17 ASSESSMENT — PAIN SCALES - GENERAL
PAINLEVEL_OUTOF10: 3
PAINLEVEL_OUTOF10: 6

## 2021-01-17 ASSESSMENT — PAIN DESCRIPTION - FREQUENCY: FREQUENCY: CONTINUOUS

## 2021-01-17 ASSESSMENT — PAIN DESCRIPTION - DESCRIPTORS: DESCRIPTORS: CRUSHING;DISCOMFORT

## 2021-01-17 ASSESSMENT — PAIN DESCRIPTION - ONSET: ONSET: ON-GOING

## 2021-01-17 ASSESSMENT — PAIN - FUNCTIONAL ASSESSMENT: PAIN_FUNCTIONAL_ASSESSMENT: ACTIVITIES ARE NOT PREVENTED

## 2021-01-17 ASSESSMENT — PAIN DESCRIPTION - LOCATION: LOCATION: LEG;FOOT

## 2021-01-17 NOTE — PROGRESS NOTES
Date: 1/17/2021    Time: 1:01 AM    Patient Placed On BIPAP/CPAP/ Non-Invasive Ventilation? Yes    If no must comment. Facial area red/color change? No           If YES are Blister/Lesion present? No   If yes must notify nursing staff  BIPAP/CPAP skin barrier?   Yes    Skin barrier type:mepilexlite       Comments:        Christi Paredes

## 2021-01-17 NOTE — PLAN OF CARE
Problem: Falls - Risk of:  Goal: Will remain free from falls  Description: Will remain free from falls  1/17/2021 1246 by Po Rivera RN  Outcome: Met This Shift     Problem: Skin Integrity:  Goal: Will show no infection signs and symptoms  Description: Will show no infection signs and symptoms  Outcome: Met This Shift     Problem: Skin Integrity:  Goal: Absence of new skin breakdown  Description: Absence of new skin breakdown  Outcome: Met This Shift     Problem: Pain:  Goal: Pain level will decrease  Description: Pain level will decrease  Outcome: Met This Shift     Problem: Pain:  Goal: Control of acute pain  Description: Control of acute pain  Outcome: Met This Shift     Problem: Pain:  Goal: Control of chronic pain  Description: Control of chronic pain  Outcome: Met This Shift

## 2021-01-17 NOTE — PROGRESS NOTES
Hospitalist Progress Note      PCP: Matilde Nuñez    Date of Admission: 1/12/2021    Chief Complaint: Shortness of breath dyspnea on exertion    Hospital Course:77year-old female with past medical history of chronic diastolic CHF, COPD on chronic home oxygen; paroxysmal atrial fibrillation, diabetes mellitus, hypertension who presented to the emergency department with worsening shortness of breath, wheezing. She reports have chronic home oxygen had to be increased from 2 L/min to 4 L/min the past few days but she is still progressively got short of breath prompting presentation to the emergency department, she was in A. fib with RVR. Chest x-ray was negative for acute cardiopulmonary abnormality; SARS-CoV-2 screen was negative.   Patient was admitted for COPD exacerbation.       Subjective: Seen and examined by me personally she is doing clinically better  Continue to have severe lymphedema with bilateral static ulcers      Medications:  Reviewed    Infusion Medications    dextrose       Scheduled Medications    metoprolol succinate  100 mg Oral BID    Mineral Oil-Hydrophil Petrolat   Topical BID    bumetanide  2 mg Intravenous Q12H    insulin glargine  100 Units Subcutaneous BID    insulin lispro  30 Units Subcutaneous TID WC    insulin lispro  0-18 Units Subcutaneous TID WC    insulin lispro  0-9 Units Subcutaneous Nightly    miconazole   Topical BID    apixaban  5 mg Oral BID    dilTIAZem  240 mg Oral Daily    escitalopram  20 mg Oral Daily    ferrous sulfate  325 mg Oral Daily with breakfast    gabapentin  300 mg Oral TID    ipratropium-albuterol  1 vial Nebulization 4x daily    montelukast  10 mg Oral Nightly    spironolactone  25 mg Oral Daily    traZODone  50 mg Oral Nightly    [START ON 1/18/2021] vitamin D  50,000 Units Oral Weekly    docusate sodium  100 mg Oral Nightly    pantoprazole  40 mg Oral QAM AC    methylPREDNISolone  40 mg Intravenous Q8H     PRN Meds: Mineral Oil-Hydrophil Petrolat **AND** Mineral Oil-Hydrophil Petrolat, traMADol, ipratropium-albuterol, glucose, dextrose, glucagon (rDNA), dextrose, ondansetron, acetaminophen, LORazepam      Intake/Output Summary (Last 24 hours) at 1/17/2021 1845  Last data filed at 1/17/2021 1359  Gross per 24 hour   Intake 1200 ml   Output 6650 ml   Net -5450 ml       Exam:    BP (!) 159/73   Pulse 61   Temp 98.6 °F (37 °C) (Temporal)   Resp 18   Ht 5' 3\" (1.6 m)   Wt 273 lb 6.4 oz (124 kg)   LMP  (LMP Unknown)   SpO2 100%   BMI 48.43 kg/m²     General appearance: Lying comfortably in bed sleeping. No apparent distress. Respiratory: Clear to auscultation bilaterally. Cardiovascular: Normal S1/S2. Regular rhythm and rate. Abdomen: Soft, non-tender, non-distended with normal bowel sounds. Musculoskeletal: No clubbing, cyanosis. B/L leg edema. Skin: Bilateral lower extremity lymphedema with chronic stasis dermatitis  Neurologic:  No focal deficit. Sleeping but easily arousable to voice. Psychiatric: Alert and oriented, thought content appropriate, normal insight  Peripheral Pulses: +2 palpable, equal bilaterally     Labs:   Recent Labs     01/17/21  0628   WBC 8.1   HGB 11.3*   HCT 36.3        Recent Labs     01/15/21  0904 01/16/21  0614 01/17/21  0628    138 136   K 4.7 4.5 4.3   CL 90* 90* 86*   CO2 34* 34* 36*   BUN 30* 34* 41*   CREATININE 0.7 0.8 0.8   CALCIUM 9.2 9.8 9.3     No results for input(s): AST, ALT, BILIDIR, BILITOT, ALKPHOS in the last 72 hours. No results for input(s): INR in the last 72 hours. No results for input(s): [employer] in the last 72 hours.     Assessment/Plan:    Active Hospital Problems    Diagnosis Date Noted    Atrial fibrillation with RVR ([facility] [address] [facility] [I48.91] 01/13/2021    COPD exacerbation (HCC) [J44.1] 11/06/2018     Acute on chronic respiratory failure   Secondary to COPD with acute exacerbation  Continue on steroids taper down, nebs as needed  Pulmonolgy recommends following up for outpatient PFTs and in lab sleep study, patient did not follow recommendation for this previously  Continue on home oxygen  Clinically improved    Paroxysmal A. fib with RVR  Now rate controlled in sinus rhythm  Anticoagulation with Eliquis  Cardiology consulted     Type 2 diabetes mellitus   poor glycemic control  exacerbated by steroid therapy  Pt is on Lantus to 100 units twice daily, add prandial dose of Humalog at 30 units, and continue with sliding scale high dose     Chronic diastolic CHF   stable, appears compensated at this time  Cardiology consult, continue Bumex  Continue on Toprol to BID    Hypertension  Continue blood pressure management    Hyperlipidemia  Continue statin    Morbid obesity   Body mass index is 50.33 kg/m².      Chronic lower extremities lymphedema chronic bilateral static ulcers  Continue on wound care    DVT Prophylaxis: On Eliquis  Diet: DIET CARB CONTROL; Carb Control: 4 carb choices (60 gms)/meal; Low Sodium (2 GM)  Code Status: Full Code    PT/OT Eval Status: New PT OT  Dispo -home with home health care after medically stable  Giles Bowden MD

## 2021-01-18 LAB
ANION GAP SERPL CALCULATED.3IONS-SCNC: 11 MMOL/L (ref 7–16)
BUN BLDV-MCNC: 43 MG/DL (ref 8–23)
CALCIUM SERPL-MCNC: 8.8 MG/DL (ref 8.6–10.2)
CHLORIDE BLD-SCNC: 87 MMOL/L (ref 98–107)
CO2: 40 MMOL/L (ref 22–29)
CREAT SERPL-MCNC: 0.8 MG/DL (ref 0.5–1)
GFR AFRICAN AMERICAN: >60
GFR NON-AFRICAN AMERICAN: >60 ML/MIN/1.73
GLUCOSE BLD-MCNC: 186 MG/DL (ref 74–99)
METER GLUCOSE: 170 MG/DL (ref 74–99)
METER GLUCOSE: 187 MG/DL (ref 74–99)
METER GLUCOSE: 206 MG/DL (ref 74–99)
METER GLUCOSE: 225 MG/DL (ref 74–99)
POTASSIUM SERPL-SCNC: 4.2 MMOL/L (ref 3.5–5)
SODIUM BLD-SCNC: 138 MMOL/L (ref 132–146)

## 2021-01-18 PROCEDURE — 6360000002 HC RX W HCPCS: Performed by: INTERNAL MEDICINE

## 2021-01-18 PROCEDURE — 82962 GLUCOSE BLOOD TEST: CPT

## 2021-01-18 PROCEDURE — 6370000000 HC RX 637 (ALT 250 FOR IP): Performed by: NURSE PRACTITIONER

## 2021-01-18 PROCEDURE — 36415 COLL VENOUS BLD VENIPUNCTURE: CPT

## 2021-01-18 PROCEDURE — 2500000003 HC RX 250 WO HCPCS: Performed by: NURSE PRACTITIONER

## 2021-01-18 PROCEDURE — 80048 BASIC METABOLIC PNL TOTAL CA: CPT

## 2021-01-18 PROCEDURE — 94640 AIRWAY INHALATION TREATMENT: CPT

## 2021-01-18 PROCEDURE — 6370000000 HC RX 637 (ALT 250 FOR IP): Performed by: INTERNAL MEDICINE

## 2021-01-18 PROCEDURE — 6370000000 HC RX 637 (ALT 250 FOR IP): Performed by: FAMILY MEDICINE

## 2021-01-18 PROCEDURE — 94660 CPAP INITIATION&MGMT: CPT

## 2021-01-18 PROCEDURE — 2700000000 HC OXYGEN THERAPY PER DAY

## 2021-01-18 PROCEDURE — 2060000000 HC ICU INTERMEDIATE R&B

## 2021-01-18 PROCEDURE — 6370000000 HC RX 637 (ALT 250 FOR IP): Performed by: HOSPITALIST

## 2021-01-18 RX ADMIN — IPRATROPIUM BROMIDE AND ALBUTEROL SULFATE 3 ML: .5; 3 SOLUTION RESPIRATORY (INHALATION) at 22:03

## 2021-01-18 RX ADMIN — INSULIN GLARGINE 100 UNITS: 100 INJECTION, SOLUTION SUBCUTANEOUS at 21:36

## 2021-01-18 RX ADMIN — TRAMADOL HYDROCHLORIDE 50 MG: 50 TABLET, FILM COATED ORAL at 14:58

## 2021-01-18 RX ADMIN — MICONAZOLE NITRATE: 20.6 POWDER TOPICAL at 09:14

## 2021-01-18 RX ADMIN — INSULIN LISPRO 3 UNITS: 100 INJECTION, SOLUTION INTRAVENOUS; SUBCUTANEOUS at 12:20

## 2021-01-18 RX ADMIN — Medication: at 21:42

## 2021-01-18 RX ADMIN — APIXABAN 5 MG: 5 TABLET, FILM COATED ORAL at 21:27

## 2021-01-18 RX ADMIN — INSULIN LISPRO 30 UNITS: 100 INJECTION, SOLUTION INTRAVENOUS; SUBCUTANEOUS at 12:19

## 2021-01-18 RX ADMIN — MICONAZOLE NITRATE: 20.6 POWDER TOPICAL at 21:42

## 2021-01-18 RX ADMIN — ACETAMINOPHEN 650 MG: 325 TABLET ORAL at 00:06

## 2021-01-18 RX ADMIN — ESCITALOPRAM 20 MG: 10 TABLET, FILM COATED ORAL at 09:13

## 2021-01-18 RX ADMIN — FERROUS SULFATE TAB 325 MG (65 MG ELEMENTAL FE) 325 MG: 325 (65 FE) TAB at 09:14

## 2021-01-18 RX ADMIN — GABAPENTIN 300 MG: 300 CAPSULE ORAL at 14:00

## 2021-01-18 RX ADMIN — INSULIN LISPRO 6 UNITS: 100 INJECTION, SOLUTION INTRAVENOUS; SUBCUTANEOUS at 06:49

## 2021-01-18 RX ADMIN — INSULIN LISPRO 3 UNITS: 100 INJECTION, SOLUTION INTRAVENOUS; SUBCUTANEOUS at 16:55

## 2021-01-18 RX ADMIN — METOPROLOL SUCCINATE 100 MG: 100 TABLET, EXTENDED RELEASE ORAL at 09:14

## 2021-01-18 RX ADMIN — IPRATROPIUM BROMIDE AND ALBUTEROL SULFATE 3 ML: .5; 3 SOLUTION RESPIRATORY (INHALATION) at 12:53

## 2021-01-18 RX ADMIN — Medication: at 09:14

## 2021-01-18 RX ADMIN — INSULIN GLARGINE 100 UNITS: 100 INJECTION, SOLUTION SUBCUTANEOUS at 06:45

## 2021-01-18 RX ADMIN — BUMETANIDE 2 MG: 0.25 INJECTION INTRAMUSCULAR; INTRAVENOUS at 05:23

## 2021-01-18 RX ADMIN — INSULIN LISPRO 3 UNITS: 100 INJECTION, SOLUTION INTRAVENOUS; SUBCUTANEOUS at 21:36

## 2021-01-18 RX ADMIN — METHYLPREDNISOLONE SODIUM SUCCINATE 40 MG: 40 INJECTION, POWDER, FOR SOLUTION INTRAMUSCULAR; INTRAVENOUS at 09:13

## 2021-01-18 RX ADMIN — INSULIN LISPRO 30 UNITS: 100 INJECTION, SOLUTION INTRAVENOUS; SUBCUTANEOUS at 16:58

## 2021-01-18 RX ADMIN — DILTIAZEM HYDROCHLORIDE 240 MG: 240 CAPSULE, EXTENDED RELEASE ORAL at 09:13

## 2021-01-18 RX ADMIN — GABAPENTIN 300 MG: 300 CAPSULE ORAL at 09:13

## 2021-01-18 RX ADMIN — ERGOCALCIFEROL 50000 UNITS: 1.25 CAPSULE, LIQUID FILLED ORAL at 08:00

## 2021-01-18 RX ADMIN — TRAMADOL HYDROCHLORIDE 50 MG: 50 TABLET, FILM COATED ORAL at 21:35

## 2021-01-18 RX ADMIN — MONTELUKAST SODIUM 10 MG: 10 TABLET ORAL at 21:27

## 2021-01-18 RX ADMIN — PANTOPRAZOLE SODIUM 40 MG: 40 TABLET, DELAYED RELEASE ORAL at 07:30

## 2021-01-18 RX ADMIN — SPIRONOLACTONE 25 MG: 25 TABLET ORAL at 09:14

## 2021-01-18 RX ADMIN — BUMETANIDE 2 MG: 0.25 INJECTION INTRAMUSCULAR; INTRAVENOUS at 16:54

## 2021-01-18 RX ADMIN — DOCUSATE SODIUM 100 MG: 100 CAPSULE ORAL at 21:27

## 2021-01-18 RX ADMIN — GABAPENTIN 300 MG: 300 CAPSULE ORAL at 21:27

## 2021-01-18 RX ADMIN — IPRATROPIUM BROMIDE AND ALBUTEROL SULFATE 3 ML: .5; 3 SOLUTION RESPIRATORY (INHALATION) at 09:25

## 2021-01-18 RX ADMIN — METHYLPREDNISOLONE SODIUM SUCCINATE 40 MG: 40 INJECTION, POWDER, FOR SOLUTION INTRAMUSCULAR; INTRAVENOUS at 16:55

## 2021-01-18 RX ADMIN — IPRATROPIUM BROMIDE AND ALBUTEROL SULFATE 3 ML: .5; 3 SOLUTION RESPIRATORY (INHALATION) at 18:20

## 2021-01-18 RX ADMIN — ACETAMINOPHEN 650 MG: 325 TABLET ORAL at 23:34

## 2021-01-18 RX ADMIN — APIXABAN 5 MG: 5 TABLET, FILM COATED ORAL at 09:14

## 2021-01-18 RX ADMIN — INSULIN LISPRO 30 UNITS: 100 INJECTION, SOLUTION INTRAVENOUS; SUBCUTANEOUS at 06:46

## 2021-01-18 RX ADMIN — METHYLPREDNISOLONE SODIUM SUCCINATE 40 MG: 40 INJECTION, POWDER, FOR SOLUTION INTRAMUSCULAR; INTRAVENOUS at 02:53

## 2021-01-18 RX ADMIN — TRAZODONE HYDROCHLORIDE 50 MG: 50 TABLET ORAL at 21:27

## 2021-01-18 ASSESSMENT — PAIN SCALES - GENERAL
PAINLEVEL_OUTOF10: 7
PAINLEVEL_OUTOF10: 5

## 2021-01-18 NOTE — CARE COORDINATION
Per physician request revisited pt to discuss possible SNF. Pt would verified discharge plan remains home with [facility] however, would like for them to continue dressing changes that she was getting from [place] 3x a week. I messaged attending with pt's request and dressing orders should she agree. I called [facility] and spoke with Raymundo Olmedo as pt's home going oxygen was not delivered over the weekend. [facility] and is sending  with today for possible discharge. Transportation will need set-up through pt's [facility] via phone [phone], subscriber # [id number]; visiting physicians needs notified of discharge via phone [phone] (to set-up home going f/u appointment with pt). I messaged attending to check for possible discharge today so as I may set-up transportation for pt. Awaiting response.

## 2021-01-18 NOTE — PROGRESS NOTES
Hospitalist Progress Note      PCP: [facility]    Date of Admission: 1/12/2021    Chief Complaint: Shortness of breath dyspnea on exertion    Hospital Course:77year-old female with past medical history of chronic diastolic CHF, COPD on chronic home oxygen; paroxysmal atrial fibrillation, diabetes mellitus, hypertension who presented to the emergency department with worsening shortness of breath, wheezing. She reports have chronic home oxygen had to be increased from 2 L/min to 4 L/min the past few days but she is still progressively got short of breath prompting presentation to the emergency department, she was in A. fib with RVR. Chest x-ray was negative for acute cardiopulmonary abnormality; SARS-CoV-2 screen was negative. Patient was admitted for COPD exacerbation. Bilateral lower extremity static ulcers with a lymphostasis-appears longstanding and chronic. Patient is on wound care. Clinically she is doing better and wanted to go home with home health care.   Plan for discharge tomorrow    Subjective: Seen and examined by me personally she is doing clinically better  Continue to have severe lymphedema with bilateral static ulcers      Medications:  Reviewed    Infusion Medications    dextrose       Scheduled Medications    metoprolol succinate  100 mg Oral BID    Mineral Oil-Hydrophil Petrolat   Topical BID    bumetanide  2 mg Intravenous Q12H    insulin glargine  100 Units Subcutaneous BID    insulin lispro  30 Units Subcutaneous TID WC    insulin lispro  0-18 Units Subcutaneous TID WC    insulin lispro  0-9 Units Subcutaneous Nightly    miconazole   Topical BID    apixaban  5 mg Oral BID    dilTIAZem  240 mg Oral Daily    escitalopram  20 mg Oral Daily    ferrous sulfate  325 mg Oral Daily with breakfast    gabapentin  300 mg Oral TID    ipratropium-albuterol  1 vial Nebulization 4x daily    montelukast  10 mg Oral Nightly    spironolactone  25 mg Oral Daily    traZODone  50 mg Oral Nightly    vitamin D  50,000 Units Oral Weekly    docusate sodium  100 mg Oral Nightly    pantoprazole  40 mg Oral QAM AC    methylPREDNISolone  40 mg Intravenous Q8H     PRN Meds: Mineral Oil-Hydrophil Petrolat **AND** Mineral Oil-Hydrophil Petrolat, traMADol, ipratropium-albuterol, glucose, dextrose, glucagon (rDNA), dextrose, ondansetron, acetaminophen, LORazepam      Intake/Output Summary (Last 24 hours) at 1/18/2021 1708  Last data filed at 1/18/2021 1449  Gross per 24 hour   Intake 600 ml   Output 4550 ml   Net -3950 ml       Exam:    /66   Pulse 61   Temp 97.6 °F (36.4 °C) (Temporal)   Resp 18   Ht 5' 3\" (1.6 m)   Wt 260 lb (117.9 kg)   LMP  (LMP Unknown)   SpO2 98%   BMI 46.06 kg/m²     General appearance: Lying comfortably in bed sleeping. No apparent distress. Respiratory: Clear to auscultation bilaterally. Cardiovascular: Normal S1/S2. Regular rhythm and rate. Abdomen: Soft, non-tender, non-distended with normal bowel sounds. Musculoskeletal: No clubbing, cyanosis. B/L leg edema. Skin: Bilateral lower extremity lymphedema with chronic stasis dermatitis  Neurologic:  No focal deficit. Sleeping but easily arousable to voice. Psychiatric: Alert and oriented, thought content appropriate, normal insight  Peripheral Pulses: +2 palpable, equal bilaterally     Labs:   Recent Labs     01/17/21  0628   WBC 8.1   HGB 11.3*   HCT 36.3        Recent Labs     01/16/21  0614 01/17/21  0628 01/18/21  0550    136 138   K 4.5 4.3 4.2   CL 90* 86* 87*   CO2 34* 36* 40*   BUN 34* 41* 43*   CREATININE 0.8 0.8 0.8   CALCIUM 9.8 9.3 8.8     No results for input(s): AST, ALT, BILIDIR, BILITOT, ALKPHOS in the last 72 hours. No results for input(s): INR in the last 72 hours. No results for input(s): Larinda Morrison in the last 72 hours.     Assessment/Plan:    Active Hospital Problems    Diagnosis Date Noted    Atrial fibrillation with RVR ([address] [I48.91] 01/13/2021    COPD exacerbation ([facility] [J44.1] 11/06/2018     Acute on chronic respiratory failure   Secondary to COPD with acute exacerbation  Continue on steroids taper down, nebs as needed  Pulmonolgy recommends following up for outpatient PFTs and in lab sleep study, patient did not follow recommendation for this previously  Continue on home oxygen  Clinically improved    Paroxysmal A. fib with RVR  Now rate controlled in sinus rhythm  Anticoagulation with Eliquis  Cardiology consulted     Type 2 diabetes mellitus   poor glycemic control  exacerbated by steroid therapy  Pt is on Lantus to 100 units twice daily, add prandial dose of Humalog at 30 units, and continue with sliding scale high dose     Chronic diastolic CHF   stable, appears compensated at this time  Cardiology consult, continue Bumex  Continue on Toprol to BID    Hypertension  Continue blood pressure management    Hyperlipidemia  Continue statin    Morbid obesity   Body mass index is 50.33 kg/m².      Chronic lower extremities lymphedema chronic bilateral static ulcers  Continue on wound care    DVT Prophylaxis: On Eliquis  Diet: DIET CARB CONTROL; Carb Control: 4 carb choices (60 gms)/meal; Low Sodium (2 GM)  Code Status: Full Code    PT/OT Eval Status: New PT OT  Dispo -home with home health care tomorrow     Haley Monroe MD

## 2021-01-18 NOTE — PROGRESS NOTES
Oj Ying M.D.,[facility]  Urbano Kim D.O., F.A.C.O.I., Claire Pacheco M.D. Patricia Kirk M.D., Lety Ruvalcaba M.D. Perla Austin D.O. Daily Pulmonary Progress Note    Patient:  Maria Guadalupe Soto 77 y.o. female MRN: [id number]     Date of Service: 1/18/2021      Synopsis     We are following patient for wheezing and dyspnea    \"CC\" shortness of breath, hypersomnolence    Code status: Full      Subjective      Patient was seen and examined. Sitting up at the side of the bed more alert and awake, eating lunch, no complaints. She has been using BiPAP for respiratory support at nighttime. Her oxygen is at 4 L nasal cannula. No cough or mucus production. No chest tightness or wheezing. Review of Systems:  Constitutional: Denies fever, weight loss, night sweats positive fatigue  Skin: Denies pigmentation, dark lesions, and rashes   HEENT: Denies hearing loss, tinnitus, ear drainage, epistaxis, sore throat, and hoarseness. Cardiovascular: Denies palpitations, chest pain, and chest pressure.   Respiratory: Positive shortness of breath  Gastrointestinal: Denies nausea, vomiting, poor appetite, diarrhea, heartburn or reflux  Genitourinary: Denies dysuria, frequency, urgency or hematuria  Musculoskeletal: Denies myalgias, muscle weakness, and bone pain chronic lymphedema, venous stasis ulcers  Neurological: Denies dizziness, vertigo, headache, and focal weakness  Psychological: Denies anxiety and depression  Endocrine: Denies heat intolerance and cold intolerance  Hematopoietic/Lymphatic: Denies bleeding problems and blood transfusions       24-hour events:  None    Objective   Vitals: /66   Pulse 61   Temp 97.6 °F (36.4 °C) (Temporal)   Resp 18   Ht 5' 3\" (1.6 m)   Wt 260 lb (117.9 kg)   LMP  (LMP Unknown)   SpO2 98%   BMI 46.06 kg/m²     I/O:    Intake/Output Summary (Last 24 hours) at 1/18/2021 1404  Last data filed at 1/18/2021 0816  Gross per 24 hour   Intake 360 ml   Output 3550 ml   Net -3190 ml       Vent Information  Skin Assessment: Clean, dry, & intact  Equipment ID: [id number]  FiO2 : 50 %  SpO2: 98 %  SpO2/FiO2 ratio: 196  I Time/ I Time %: 1 s  Mask Type: Full face mask  Mask Size: Medium  Bonnet size: Medium       IPAP: 15 cmH20  CPAP/EPAP: 6 cmH2O     CURRENT MEDS :  Scheduled Meds:   metoprolol succinate  100 mg Oral BID    Mineral Oil-Hydrophil Petrolat   Topical BID    bumetanide  2 mg Intravenous Q12H    insulin glargine  100 Units Subcutaneous BID    insulin lispro  30 Units Subcutaneous TID WC    insulin lispro  0-18 Units Subcutaneous TID WC    insulin lispro  0-9 Units Subcutaneous Nightly    miconazole   Topical BID    apixaban  5 mg Oral BID    dilTIAZem  240 mg Oral Daily    escitalopram  20 mg Oral Daily    ferrous sulfate  325 mg Oral Daily with breakfast    gabapentin  300 mg Oral TID    ipratropium-albuterol  1 vial Nebulization 4x daily    montelukast  10 mg Oral Nightly    spironolactone  25 mg Oral Daily    traZODone  50 mg Oral Nightly    vitamin D  50,000 Units Oral Weekly    docusate sodium  100 mg Oral Nightly    pantoprazole  40 mg Oral QAM AC    methylPREDNISolone  40 mg Intravenous Q8H       Physical Exam:  General Appearance: appears comfortable in no acute distress. HEENT: Normocephalic atraumatic without obvious abnormality   Neck: Lips, mucosa, and tongue normal.  Supple, symmetrical, trachea midline, no adenopathy;thyroid:  no enlargement/tenderness/nodules or JVD. Lung: Breath sounds diminished bilaterally respirations   unlabored. Symmetrical expansion. Heart: RRR, normal S1, S2. No MRG  Abdomen: Soft, NT, ND. BS present x 4 quadrants. No bruit or organomegaly. Extremities: Pedal pulses 2+ symmetric b/l. Extremities normal, no cyanosis, clubbing, or edema. Musculokeletal: No joint swelling, no muscle tenderness. ROM normal in all joints of extremities.    Neurologic: Mental status: Alert and Oriented X3 .    Pertinent/ New Labs and Imaging Studies     Imaging Personally Reviewed:    Chest x-ray 1/16/2021     FINDINGS:   Lung volumes are shallow and there bibasilar airspace opacities, likely on   the basis of atelectasis.  No evidence of a sizable pleural effusion.  No   pneumothorax is identified.  Heart size is unable to be accurately assessed   on this single portable view of the chest, but appears to be stable.  There   are advanced degenerative changes of the shoulders bilaterally.       Impression   Shallow lung volumes with bibasilar airspace opacities which may be on the   basis of atelectasis.  Multifocal pneumonia or pulmonary edema could have a   similar appearance.              Abd CT lung windows 5/29/2020  Lower lung bases demonstrate areas of subpleural increased density are    discrete subpleural atelectasis impulse lower lobes.                       Echo:  2017   Summary   Mild concentric left ventricular hypertrophy.   Ejection fraction is visually estimated at 55-65%.   Stage II diastolic dysfunction.   The left atrium is severely dilated.   Elevated L atrial pressure   Mild mitral regurgitation   The aortic valve appears mildly sclerotic.   Pulmonary hypertension is mild to moderate .   No evidence of pericardial effusion.      Signature        Labs:  Lab Results   Component Value Date    WBC 8.1 01/17/2021    HGB 11.3 01/17/2021    HCT 36.3 01/17/2021    MCV 81.9 01/17/2021    MCH 25.5 01/17/2021    MCHC 31.1 01/17/2021    RDW 14.9 01/17/2021     01/17/2021    MPV 9.3 01/17/2021     Lab Results   Component Value Date     01/18/2021    K 4.2 01/18/2021    K 4.5 01/12/2021    CL 87 01/18/2021    CO2 40 01/18/2021    BUN 43 01/18/2021    CREATININE 0.8 01/18/2021    LABALBU 4.0 01/12/2021    LABALBU 4.5 11/02/2011    CALCIUM 8.8 01/18/2021    GFRAA >60 01/18/2021    LABGLOM >60 01/18/2021     Lab Results   Component Value Date    PROTIME 12.4 01/12/2021    INR 1.1 01/12/2021     No results for input(s): PROBNP in the last 72 hours. No results for input(s): PROCAL in the last 72 hours. This SmartLink has not been configured with any valid records. Micro:  No results for input(s): CULTRESP in the last 72 hours. No results for input(s): LABGRAM in the last 72 hours. No results for input(s): LEGUR in the last 72 hours. No results for input(s): STREPNEUMAGU in the last 72 hours. No results for input(s): LP1UAG in the last 72 hours. Assessment:      1. Acute on chronic respiratory failure with hypoxia and hypercapnia  2. Untreated moderate SAM, previous AHI 23 , sleep study at [facility] 2018  3. Lactic acidosis  4. Chronic diastolic heart failure  5. Morbid obesity  6. Restrictive ventilatory component related to body habitus  7. Obesity hypoventilation syndrome  8. Medical noncompliance  9. Diabetes mellitus type 2, uncontrolled hyperglycemia  10. A. fib RVR  11. Chronic lymphedema    Plan:   12. Oxygen therapy 4 L nasal cannula keep greater than 92%  13. Noninvasive ventilation nocturnal and as needed during daytime AVAPS   14. Follow ABGs and chest x-ray. 7.35/59/78/32/5.9/94% on 4 L nasal cannula  15. Trend lactic acid 3.4 on 1/15/2021  16. Bumex on hold per primary team  17. Wound care  18. Eliquis BID. GI prophylaxis  19. Recommend outpatient PFTs and full in lab split sleep study with end-tidal CO2 monitoring. Patient has never gone for this testing previously as recommended, we will reorder prior to  Discharge. rs  This plan of care was reviewed in collaboration with Dr. Hanna Carbajal  Electronically signed by RAUL Rivera CNP on 1/18/2021 at 2:04 PM      I personally saw, examined, and cared for the patient. Labs, medications, radiographs reviewed. I agree with history exam and plans detailed in NP note.   Donna Betancourt MD

## 2021-01-19 VITALS
RESPIRATION RATE: 16 BRPM | TEMPERATURE: 97.8 F | OXYGEN SATURATION: 96 % | HEART RATE: 66 BPM | DIASTOLIC BLOOD PRESSURE: 63 MMHG | BODY MASS INDEX: 46.14 KG/M2 | WEIGHT: 260.38 LBS | HEIGHT: 63 IN | SYSTOLIC BLOOD PRESSURE: 134 MMHG

## 2021-01-19 PROBLEM — R19.5 OCCULT BLOOD IN STOOLS: Status: RESOLVED | Noted: 2018-11-06 | Resolved: 2021-01-19

## 2021-01-19 PROBLEM — R00.2 PALPITATIONS: Status: RESOLVED | Noted: 2018-11-06 | Resolved: 2021-01-19

## 2021-01-19 LAB
ANION GAP SERPL CALCULATED.3IONS-SCNC: 14 MMOL/L (ref 7–16)
BUN BLDV-MCNC: 51 MG/DL (ref 8–23)
CALCIUM SERPL-MCNC: 9.2 MG/DL (ref 8.6–10.2)
CHLORIDE BLD-SCNC: 83 MMOL/L (ref 98–107)
CO2: 39 MMOL/L (ref 22–29)
CREAT SERPL-MCNC: 0.9 MG/DL (ref 0.5–1)
GFR AFRICAN AMERICAN: >60
GFR NON-AFRICAN AMERICAN: >60 ML/MIN/1.73
GLUCOSE BLD-MCNC: 186 MG/DL (ref 74–99)
METER GLUCOSE: 186 MG/DL (ref 74–99)
METER GLUCOSE: 202 MG/DL (ref 74–99)
METER GLUCOSE: 355 MG/DL (ref 74–99)
POTASSIUM SERPL-SCNC: 3.9 MMOL/L (ref 3.5–5)
SODIUM BLD-SCNC: 136 MMOL/L (ref 132–146)

## 2021-01-19 PROCEDURE — 94660 CPAP INITIATION&MGMT: CPT

## 2021-01-19 PROCEDURE — 6370000000 HC RX 637 (ALT 250 FOR IP): Performed by: NURSE PRACTITIONER

## 2021-01-19 PROCEDURE — 97530 THERAPEUTIC ACTIVITIES: CPT

## 2021-01-19 PROCEDURE — 6370000000 HC RX 637 (ALT 250 FOR IP): Performed by: FAMILY MEDICINE

## 2021-01-19 PROCEDURE — 94760 N-INVAS EAR/PLS OXIMETRY 1: CPT

## 2021-01-19 PROCEDURE — 6370000000 HC RX 637 (ALT 250 FOR IP): Performed by: INTERNAL MEDICINE

## 2021-01-19 PROCEDURE — 82962 GLUCOSE BLOOD TEST: CPT

## 2021-01-19 PROCEDURE — 36415 COLL VENOUS BLD VENIPUNCTURE: CPT

## 2021-01-19 PROCEDURE — 80048 BASIC METABOLIC PNL TOTAL CA: CPT

## 2021-01-19 PROCEDURE — 6370000000 HC RX 637 (ALT 250 FOR IP): Performed by: HOSPITALIST

## 2021-01-19 PROCEDURE — 94640 AIRWAY INHALATION TREATMENT: CPT

## 2021-01-19 PROCEDURE — 2700000000 HC OXYGEN THERAPY PER DAY

## 2021-01-19 PROCEDURE — 2500000003 HC RX 250 WO HCPCS: Performed by: NURSE PRACTITIONER

## 2021-01-19 PROCEDURE — 6360000002 HC RX W HCPCS: Performed by: INTERNAL MEDICINE

## 2021-01-19 RX ORDER — PREDNISONE 20 MG/1
40 TABLET ORAL DAILY
Qty: 10 TABLET | Refills: 0 | Status: SHIPPED | OUTPATIENT
Start: 2021-01-20 | End: 2021-01-25

## 2021-01-19 RX ORDER — INSULIN GLARGINE 100 [IU]/ML
100 INJECTION, SOLUTION SUBCUTANEOUS 2 TIMES DAILY
Qty: 1 VIAL | Refills: 3 | Status: ON HOLD | OUTPATIENT
Start: 2021-01-19 | End: 2021-04-10

## 2021-01-19 RX ORDER — MINERAL OIL/HYDROPHIL PETROLAT
30 OINTMENT (GRAM) TOPICAL 3 TIMES DAILY PRN
Qty: 240 G | Refills: 1 | Status: SHIPPED | OUTPATIENT
Start: 2021-01-19 | End: 2021-04-30

## 2021-01-19 RX ADMIN — METHYLPREDNISOLONE SODIUM SUCCINATE 40 MG: 40 INJECTION, POWDER, FOR SOLUTION INTRAMUSCULAR; INTRAVENOUS at 10:24

## 2021-01-19 RX ADMIN — INSULIN LISPRO 7 UNITS: 100 INJECTION, SOLUTION INTRAVENOUS; SUBCUTANEOUS at 12:43

## 2021-01-19 RX ADMIN — INSULIN LISPRO 6 UNITS: 100 INJECTION, SOLUTION INTRAVENOUS; SUBCUTANEOUS at 16:37

## 2021-01-19 RX ADMIN — TRAMADOL HYDROCHLORIDE 50 MG: 50 TABLET, FILM COATED ORAL at 10:26

## 2021-01-19 RX ADMIN — IPRATROPIUM BROMIDE AND ALBUTEROL SULFATE 3 ML: .5; 3 SOLUTION RESPIRATORY (INHALATION) at 16:08

## 2021-01-19 RX ADMIN — Medication: at 08:00

## 2021-01-19 RX ADMIN — DILTIAZEM HYDROCHLORIDE 240 MG: 240 CAPSULE, EXTENDED RELEASE ORAL at 10:20

## 2021-01-19 RX ADMIN — MICONAZOLE NITRATE: 20.6 POWDER TOPICAL at 08:00

## 2021-01-19 RX ADMIN — SPIRONOLACTONE 25 MG: 25 TABLET ORAL at 10:21

## 2021-01-19 RX ADMIN — GABAPENTIN 300 MG: 300 CAPSULE ORAL at 14:00

## 2021-01-19 RX ADMIN — IPRATROPIUM BROMIDE AND ALBUTEROL SULFATE 3 ML: .5; 3 SOLUTION RESPIRATORY (INHALATION) at 10:30

## 2021-01-19 RX ADMIN — ACETAMINOPHEN 650 MG: 325 TABLET ORAL at 12:47

## 2021-01-19 RX ADMIN — APIXABAN 5 MG: 5 TABLET, FILM COATED ORAL at 08:30

## 2021-01-19 RX ADMIN — GABAPENTIN 300 MG: 300 CAPSULE ORAL at 08:30

## 2021-01-19 RX ADMIN — METHYLPREDNISOLONE SODIUM SUCCINATE 40 MG: 40 INJECTION, POWDER, FOR SOLUTION INTRAMUSCULAR; INTRAVENOUS at 03:46

## 2021-01-19 RX ADMIN — BUMETANIDE 2 MG: 0.25 INJECTION INTRAMUSCULAR; INTRAVENOUS at 15:00

## 2021-01-19 RX ADMIN — INSULIN LISPRO 3 UNITS: 100 INJECTION, SOLUTION INTRAVENOUS; SUBCUTANEOUS at 08:02

## 2021-01-19 RX ADMIN — PANTOPRAZOLE SODIUM 40 MG: 40 TABLET, DELAYED RELEASE ORAL at 06:34

## 2021-01-19 RX ADMIN — METOPROLOL SUCCINATE 100 MG: 100 TABLET, EXTENDED RELEASE ORAL at 08:00

## 2021-01-19 RX ADMIN — BUMETANIDE 2 MG: 0.25 INJECTION INTRAMUSCULAR; INTRAVENOUS at 03:46

## 2021-01-19 RX ADMIN — INSULIN GLARGINE 100 UNITS: 100 INJECTION, SOLUTION SUBCUTANEOUS at 06:34

## 2021-01-19 RX ADMIN — INSULIN LISPRO 30 UNITS: 100 INJECTION, SOLUTION INTRAVENOUS; SUBCUTANEOUS at 16:35

## 2021-01-19 RX ADMIN — FERROUS SULFATE TAB 325 MG (65 MG ELEMENTAL FE) 325 MG: 325 (65 FE) TAB at 08:00

## 2021-01-19 RX ADMIN — INSULIN LISPRO 30 UNITS: 100 INJECTION, SOLUTION INTRAVENOUS; SUBCUTANEOUS at 12:44

## 2021-01-19 RX ADMIN — ESCITALOPRAM 20 MG: 10 TABLET, FILM COATED ORAL at 10:20

## 2021-01-19 RX ADMIN — IPRATROPIUM BROMIDE AND ALBUTEROL SULFATE 3 ML: .5; 3 SOLUTION RESPIRATORY (INHALATION) at 13:43

## 2021-01-19 ASSESSMENT — PAIN SCALES - GENERAL: PAINLEVEL_OUTOF10: 6

## 2021-01-19 NOTE — PROGRESS NOTES
Physical Therapy  Treatment Note  Name: Terry Roper  : 1955  MRN: [id number]    Referring Provider:  Ward Chang DO    Date of Service: 2021    Evaluating PT:  Shahnaz Almaguer, PT, DPT [id number]    Room #:  5940/4487-K  Diagnosis:  Afib with RVR  PMHx/PSHx:  Afib, asthma, sinus tachycardia, HTN, HLD, disc disorder, arthritis, LVH, occipital neuralgia, anemia, lymphadenitis, GERD, CHF, COPD, anxiety, depression, DM 2, L knee replacement   Precautions:  Falls, BLE edema, O2 via NC  Equipment Needs:  Front [employer]    SUBJECTIVE:    Pt lives with  and two adult grandchildren in a 2 story home with ramp to enter. Bed is on first floor and bath is on first floor. Pt ambulated short household distances with front [employer] PTA. Pt sleeps in lift chair and utilizes BSC. Pt also reports there is no tub on the first floor so she sponge bathes. Pt arrives to hospital with complaints of SOB. Pt typically wears 2L O2 at home. OBJECTIVE:   Initial Evaluation  Date: 2021 Treatment  Date: 2021 Short Term/ Long Term   Goals   AM-PAC 6 Clicks     Was pt agreeable to Eval/treatment? yes yes    Does pt have pain? Severe RLE pain. Pt does not quantify.   Reports movement and weight bearing exacerbates pain RLE and B feet pain 4/10    Bed Mobility  Rolling: maxA  Supine to sit: maxA  Sit to supine: maxA x 2  Scooting: maxA SBA all aspects Rolling: Giuseppe  Supine to sit: Giuseppe  Sit to supine: Giuseppe  Scooting: Giuseppe   Transfers Sit to stand: maxA  Stand to sit: maxA  Stand pivot: Giuseppe front [employer] SBA all aspects Sit to stand: Giuseppe  Stand to sit: Giuseppe  Stand pivot: Tanner front [employer]   Ambulation    40 feet with front [employer] Giuseppe 50 feet front [employer]  feet with front [employer] Tanner   Stair negotiation: ascended and descended  NT NT NA   ROM BUE:  Per OT note  BLE:  Limited by edema     Strength BUE:  Per OT note  BLE:  NT d/t edema and pain     Balance Sitting EOB:  SBA  Dynamic Standing:  Giuseppe front [employer] Sitting EOB: Independent  Dynamic standing: SBA front [employer] Sitting EOB:  Independent  Dynamic Standing:  Tanner front [employer]     Pt is A & O x 4  Sensation:  Pt denies numbness and tingling to extremities  Edema: Moderate edema throughout BLE    Patient education  Pt educated on role of PT intervention. Pt educated on safety in room with utilization of call light for assistance with mobility. Pt educated on benefits of maximizing OOB time for improved overall mobility and activity tolerance. Patient response to education:   Pt verbalized understanding Pt demonstrated skill Pt requires further education in this area   yes yes yes     ASSESSMENT:    Comments:  RN cleared pt for activity prior to session. Pt received supine in bed and agreeable to PT intervention at this time. Pt performed all functional mobility as noted above. Pt demonstrating significantly improved functional mobility on this date. She continues to present with chronic BLE edema and pain but was able to complete all functional mobility and household ambulation without physical assistance. At end of session, pt returned to supine and left with all needs met and call light in reach. Pt would benefit from continued skilled PT intervention to maximize functional mobility and independence and improve overall health and wellness. Treatment:  Patient practiced and was instructed in the following treatment:     Therapeutic Activities Completed:  o Functional mobility as noted above:   - Bed mobility: SBA all aspects. Pt required increased time and effort to complete. Min VC for efficient sequencing.  - Transfer training: SBA all aspects with front [employer].  Cues for proper hand placement and sequencing with front [employer] for safety.   - Ambulation: 50 feet front WW SBA  o Skilled repositioning in supine with HOB elevated for comfort and good posture to promote improved cardiorespiratory function. o Pt education as noted above.     PLAN:    Patient is making fair progress towards established goals. Will continue with current POC.       Time in  1105  Time out  1115    Total Treatment Time  10 minutes     CPT codes:  [] Gait training 91910 0 minutes  [] Manual therapy 76923 0 minutes  [x] Therapeutic activities 88775 10 minutes  [] Therapeutic exercises 52751 0 minutes  [] Neuromuscular reeducation 36592 0 minutes    Shankar Rojo PT, DPT  [id number]

## 2021-01-19 NOTE — PROGRESS NOTES
Pt discharge information given and signed. Pt will be picked up via [facility] and taken to her home. Wound care will see her outpatient for her BLE lymphedema. Pt gerber removed and post removal urine output was 600cc. IV removed. No further needs at this time.

## 2021-01-19 NOTE — CARE COORDINATION
Discharge order noted; notified Tamiko with [facility] of discharge however, still need wound care orders added to original order from [email or web address] (will notify bedside RN Seema Lloyd); notified Vishal Hurley with visiting physicians and faxed AVS to [phone]; transportation set-up through [facility] [employer] [facility] transporter will notify unit upon arrival between [phone], confirmation # [id number], envelope in soft-chart, pt has home going oxygen in room delivered last evening via [employer].

## 2021-01-19 NOTE — PROGRESS NOTES
Richmond Licona M.D.,[facility]  Andrey Garcia D.O., FNAVOCASSIE., Amilcar Gaitan M.D. Maxim Parada M.D., Maria Vega M.D. Denton Felty, D.O. Daily Pulmonary Progress Note    Patient:  Ryanne Laura 77 y.o. female MRN: [id number]     Date of Service: 1/19/2021      Synopsis     We are following patient for wheezing and dyspnea    \"CC\" shortness of breath, hypersomnolence    Code status: Full      Subjective      Patient was seen and examined. Sitting up at the side of the bed more alert and awake, eating lunch, no complaints. She has been using BiPAP for respiratory support at nighttime. Her oxygen is at 4 L nasal cannula. No cough or mucus production. No chest tightness or wheezing. Review of Systems:  Constitutional: Denies fever, weight loss, night sweats positive fatigue  Skin: Denies pigmentation, dark lesions, and rashes   HEENT: Denies hearing loss, tinnitus, ear drainage, epistaxis, sore throat, and hoarseness. Cardiovascular: Denies palpitations, chest pain, and chest pressure.   Respiratory: Positive shortness of breath  Gastrointestinal: Denies nausea, vomiting, poor appetite, diarrhea, heartburn or reflux  Genitourinary: Denies dysuria, frequency, urgency or hematuria  Musculoskeletal: Denies myalgias, muscle weakness, and bone pain chronic lymphedema, venous stasis ulcers  Neurological: Denies dizziness, vertigo, headache, and focal weakness  Psychological: Denies anxiety and depression  Endocrine: Denies heat intolerance and cold intolerance  Hematopoietic/Lymphatic: Denies bleeding problems and blood transfusions     24-hour events:  None    Objective   Vitals: /63   Pulse 66   Temp 97.8 °F (36.6 °C) (Temporal)   Resp 16   Ht 5' 3\" (1.6 m)   Wt 260 lb 6 oz (118.1 kg)   LMP  (LMP Unknown)   SpO2 96%   BMI 46.12 kg/m²     I/O:    Intake/Output Summary (Last 24 hours) at 1/19/2021 1328  Last data filed at 1/19/2021 1024  Gross per 24 hour   Intake 360 ml   Output 6700 ml   Net -6340 ml       Vent Information  Skin Assessment: Clean, dry, & intact  Equipment ID: [id number]  FiO2 : 50 %  SpO2: 96 %  SpO2/FiO2 ratio: 196  I Time/ I Time %: 1 s  Mask Type: Full face mask  Mask Size: Medium  Bonnet size: Medium       IPAP: 15 cmH20  CPAP/EPAP: 6 cmH2O     CURRENT MEDS :  Scheduled Meds:   metoprolol succinate  100 mg Oral BID    Mineral Oil-Hydrophil Petrolat   Topical BID    bumetanide  2 mg Intravenous Q12H    insulin glargine  100 Units Subcutaneous BID    insulin lispro  30 Units Subcutaneous TID WC    insulin lispro  0-18 Units Subcutaneous TID WC    insulin lispro  0-9 Units Subcutaneous Nightly    miconazole   Topical BID    apixaban  5 mg Oral BID    dilTIAZem  240 mg Oral Daily    escitalopram  20 mg Oral Daily    ferrous sulfate  325 mg Oral Daily with breakfast    gabapentin  300 mg Oral TID    ipratropium-albuterol  1 vial Nebulization 4x daily    montelukast  10 mg Oral Nightly    spironolactone  25 mg Oral Daily    traZODone  50 mg Oral Nightly    vitamin D  50,000 Units Oral Weekly    docusate sodium  100 mg Oral Nightly    pantoprazole  40 mg Oral QAM AC    methylPREDNISolone  40 mg Intravenous Q8H       Physical Exam:  General Appearance: appears comfortable in no acute distress. HEENT: Normocephalic atraumatic without obvious abnormality   Neck: Lips, mucosa, and tongue normal.  Supple, symmetrical, trachea midline, no adenopathy;thyroid:  no enlargement/tenderness/nodules or JVD. Lung: Breath sounds diminished bilaterally respirations   unlabored. Symmetrical expansion. Heart: RRR, normal S1, S2. No MRG  Abdomen: Soft, NT, ND. BS present x 4 quadrants. No bruit or organomegaly. Extremities: Pedal pulses 2+ symmetric b/l. Extremities normal, no cyanosis, clubbing, or edema. Musculokeletal: No joint swelling, no muscle tenderness. ROM normal in all joints of extremities.    Neurologic: Mental status: Alert and Oriented X3 .    Pertinent/ New Labs and Imaging Studies     Imaging Personally Reviewed:    Chest x-ray 1/16/2021     FINDINGS:   Lung volumes are shallow and there bibasilar airspace opacities, likely on   the basis of atelectasis.  No evidence of a sizable pleural effusion.  No   pneumothorax is identified.  Heart size is unable to be accurately assessed   on this single portable view of the chest, but appears to be stable.  There   are advanced degenerative changes of the shoulders bilaterally.       Impression   Shallow lung volumes with bibasilar airspace opacities which may be on the   basis of atelectasis.  Multifocal pneumonia or pulmonary edema could have a   similar appearance.              Abd CT lung windows 5/29/2020  Lower lung bases demonstrate areas of subpleural increased density are    discrete subpleural atelectasis impulse lower lobes.                       Echo:  2017   Summary   Mild concentric left ventricular hypertrophy.   Ejection fraction is visually estimated at 55-65%.   Stage II diastolic dysfunction.   The left atrium is severely dilated.   Elevated L atrial pressure   Mild mitral regurgitation   The aortic valve appears mildly sclerotic.   Pulmonary hypertension is mild to moderate .   No evidence of pericardial effusion.      Signature        Labs:  Lab Results   Component Value Date    WBC 8.1 01/17/2021    HGB 11.3 01/17/2021    HCT 36.3 01/17/2021    MCV 81.9 01/17/2021    MCH 25.5 01/17/2021    MCHC 31.1 01/17/2021    RDW 14.9 01/17/2021     01/17/2021    MPV 9.3 01/17/2021     Lab Results   Component Value Date     01/19/2021    K 3.9 01/19/2021    K 4.5 01/12/2021    CL 83 01/19/2021    CO2 39 01/19/2021    BUN 51 01/19/2021    CREATININE 0.9 01/19/2021    LABALBU 4.0 01/12/2021    LABALBU 4.5 11/02/2011    CALCIUM 9.2 01/19/2021    GFRAA >60 01/19/2021    LABGLOM >60 01/19/2021     Lab Results   Component Value Date    PROTIME 12.4 01/12/2021    INR 1.1 01/12/2021     No results for input(s): PROBNP in the last 72 hours. No results for input(s): PROCAL in the last 72 hours. This SmartLink has not been configured with any valid records. Micro:  No results for input(s): CULTRESP in the last 72 hours. No results for input(s): LABGRAM in the last 72 hours. No results for input(s): LEGUR in the last 72 hours. No results for input(s): STREPNEUMAGU in the last 72 hours. No results for input(s): LP1UAG in the last 72 hours. Assessment:      1. Acute on chronic respiratory failure with hypoxia and hypercapnia  2. Untreated moderate SAM, previous AHI 23 , sleep study at [facility] 2018  3. COPD with exacerbation  4. Lactic acidosis  5. Chronic diastolic heart failure  6. Morbid obesity  7. Restrictive ventilatory component related to body habitus  8. Obesity hypoventilation syndrome  9. Medical noncompliance  10. Diabetes mellitus type 2, uncontrolled hyperglycemia  11. A. fib RVR  12. Chronic lymphedema    Plan:   13. Oxygen therapy 4 L nasal cannula keep greater than 92%  14. Noninvasive ventilation nocturnal and as needed during daytime AVAPS   15. Prednisone taper written for dc  16. Wound care  17. Eliquis BID. GI prophylaxis  18. Ok to dc from a pulmonary perspective  19. Recommend outpatient PFTs and full in lab split sleep study with end-tidal CO2 monitoring. Patient has never gone for this testing previously as recommended, we will reorder prior to  Discharge. rs  This plan of care was reviewed in collaboration with Dr. Brandi Walker  Electronically signed by RAUL Marie - CNP on 1/19/2021 at 1:28 PM    I personally saw, examined, and cared for the patient. Labs, medications, radiographs reviewed. I agree with history exam and plans detailed in NP note.   Janae Chand

## 2021-01-19 NOTE — DISCHARGE SUMMARY
Hospital Medicine Discharge Summary    Patient ID: Neal Hugo      Patient's PCP: Alisia Feliz    Admit Date: 1/12/2021     Discharge Date:  1/19/2021    Admitting Physician: Lois Levy MD     Discharge Physician: Cynthia Lambert MD     Discharge Diagnoses: Active Hospital Problems    Diagnosis Date Noted    Atrial fibrillation with RVR ([facility] [address] [I48.91] 01/13/2021    COPD exacerbation ([facility] [address] [J44.1] 11/06/2018       The patient was seen and examined on day of discharge and this discharge summary is in conjunction with any daily progress note from day of discharge. Hospital Course:   68-year-old female with past medical history of chronic diastolic CHF, COPD on chronic home oxygen; paroxysmal atrial fibrillation, diabetes mellitus, hypertension who presented to the emergency department with worsening shortness of breath, wheezing.  She reports have chronic home oxygen had to be increased from 2 L/min to 4 L/min the past few days but she is still progressively got short of breath prompting presentation to the emergency department, she was in A. fib with RVR.  Chest x-ray was negative for acute cardiopulmonary abnormality; SARS-CoV-2 screen was negative.  Patient was admitted for COPD exacerbation. Bilateral lower extremity static ulcers with a lymphostasis-appears longstanding and chronic. Patient is on wound care. Clinically she is doing better and wanted to go home with home health care. Plan for discharge home today- her wound care is set up with wound care doctor as well as home health care set up per case management. Patient doing clinically better can be discharged home on home oxygen and done follow-up with your primary care as outpatient.     Discharge diagnosis    Acute on chronic respiratory failure -resolved at baseline now  Secondary to COPD with acute exacerbation  Continue on steroids taper down, nebs as needed  Pulmonolgy recommends following up for outpatient PFTs and in lab sleep study, patient did not follow recommendation for this previously  Continue on home oxygen  Clinically improved     Paroxysmal A. fib with RVR  Now rate controlled in sinus rhythm  Anticoagulation with Eliquis  Cardiology consulted      Type 2 diabetes mellitus   poor glycemic control  exacerbated by steroid therapy  Pt is on Lantus to 100 units twice daily, add prandial dose of Humalog at 30 units, and continue with sliding scale high dose   Patient need to follow-up with PCP for adjustment of her insulin regimen  Insulin regiment higher than usual secondary to steroid therapy  Insulin might need to be decreased after steroid therapy is completed     Chronic diastolic CHF   stable, appears compensated at this time  Cardiology consult, continue Bumex  Continue on Toprol to BID     Hypertension  Continue blood pressure management     Hyperlipidemia  Continue statin     Morbid obesity   Body mass index is 50.33 kg/m².      Chronic lower extremities lymphedema chronic bilateral static ulcers  Continue on wound care    Patient can be discharged today with home health care and wound care to follow       Exam:     /63   Pulse 66   Temp 97.8 °F (36.6 °C) (Temporal)   Resp 16   Ht 5' 3\" (1.6 m)   Wt 260 lb 6 oz (118.1 kg)   LMP  (LMP Unknown)   SpO2 96%   BMI 46.12 kg/m²     General appearance: No apparent distress, appears stated age and cooperative. HEENT: Pupils equal, round, and reactive to light. Conjunctivae/corneas clear. Neck: Supple, with full range of motion. No jugular venous distention. Trachea midline. Respiratory:  Normal respiratory effort. Clear to auscultation, bilaterally without Rales/Wheezes/Rhonchi. Cardiovascular: Regular rate and rhythm with normal S1/S2 without murmurs, rubs or gallops. Abdomen: Soft, non-tender, non-distended with normal bowel sounds. Musculoskeletal: No clubbing, cyanosis or edema bilaterally. Full range of motion without deformity.   Skin: Skin color, texture, turgor normal.  No rashes or lesions. Neurologic:  Neurovascularly intact without any focal sensory/motor deficits. Cranial nerves: II-XII intact, grossly non-focal.  Psychiatric: Alert and oriented, thought content appropriate, normal insight      Consults:     IP CONSULT TO INTERNAL MEDICINE  IP CONSULT TO PULMONOLOGY  IP CONSULT TO CARDIOLOGY  IP CONSULT TO HEART FAILURE NURSE/COORDINATOR  IP CONSULT TO DIETITIAN    Significant Diagnostic Studies:    XR CHEST PORTABLE   Final Result   Shallow lung volumes with bibasilar airspace opacities which may be on the   basis of atelectasis. Multifocal pneumonia or pulmonary edema could have a   similar appearance. XR CHEST PORTABLE   Final Result   No acute process. Disposition:  Home with [facility] AT [facility]     Discharge Instructions/Follow-up:  PCP, wound care     Code Status:  Full Code     Activity: activity as tolerated    Diet: diabetic diet    Labs:  For convenience and continuity at follow-up the following most recent labs are provided:      CBC:    Lab Results   Component Value Date    WBC 8.1 01/17/2021    HGB 11.3 01/17/2021    HCT 36.3 01/17/2021     01/17/2021       Renal:    Lab Results   Component Value Date     01/19/2021    K 3.9 01/19/2021    K 4.5 01/12/2021    CL 83 01/19/2021    CO2 39 01/19/2021    BUN 51 01/19/2021    CREATININE 0.9 01/19/2021    CALCIUM 9.2 01/19/2021    PHOS 3.2 05/30/2018       Discharge Medications:     Current Discharge Medication List           Details   Mineral Oil-Hydrophil Petrolat OINT Apply 30 mLs topically 3 times daily as needed (wounds)  Qty: 240 g, Refills: 1      predniSONE (DELTASONE) 20 MG tablet Take 2 tablets by mouth daily for 5 days  Qty: 10 tablet, Refills: 0              Details   !! insulin lispro (HUMALOG) 100 UNIT/ML injection vial Inject 0-18 Units into the skin 3 times daily (with meals)  Qty: 1 vial, Refills: 3      !! insulin lispro (HUMALOG) 100 UNIT/ML injection vial MCG/ACT inhaler Inhale 2 puffs into the lungs 4 times daily as needed for Wheezing  Qty: 1 Inhaler, Refills: 5      metoprolol succinate (TOPROL XL) 100 MG extended release tablet Take 1 tablet by mouth daily  Qty: 90 tablet, Refills: 1      ferrous sulfate (IRON 325) 325 (65 Fe) MG tablet Take 1 tablet by mouth daily (with breakfast)  Qty: 90 tablet, Refills: 1    Associated Diagnoses: Iron deficiency      acetaminophen (TYLENOL) 325 MG tablet Take 650 mg by mouth every 6 hours as needed for Pain      vitamin D (ERGOCALCIFEROL) 1.25 MG (50270 UT) CAPS capsule Take 50,000 Units by mouth once a week Given Monday       !! - Potential duplicate medications found. Please discuss with provider. Time Spent on discharge is more than 45 minutes in the examination, evaluation, counseling and review of medications and discharge plan.       Signed:    Gabe Gates MD   1/19/2021

## 2021-01-19 NOTE — CARE COORDINATION
Received call from Minnie Monroe , updated her on discharge, [facility], transportation, and home O2 that she already has set up. Marisela STANLEY

## 2021-01-20 ENCOUNTER — CARE COORDINATION (OUTPATIENT)
Dept: CASE MANAGEMENT | Age: 66
End: 2021-01-20

## 2021-01-20 NOTE — CARE COORDINATION
[facility] Transitions Initial Follow Up Call    Call within 2 business days of discharge: Yes    Patient: Luh Gaines Patient : 1955   MRN: [id number]  Reason for Admission: COPD exacerbation  Discharge Date: 21 RARS: Readmission Risk Score: 31      Last Discharge [facility]       Complaint Diagnosis Description Type Department Provider    21 Shortness of Breath COPD exacerbation ([address] . .. ED to Hosp-Admission (Discharged) (ADMITTED) [facility] [address] Luis Thomas MD; Viridiana Ramos,... Challenges to be reviewed by the provider   Additional needs identified to be addressed with provider No  none    Discussed COVID-19 related testing which was available at this time. Test results were negative. Patient informed of results, if available? Already aware         Method of communication with provider : none    Was this a readmission? No  Patient stated reason for admission: trouble breathing  Patients top risk factors for readmission: medical condition and polypharmacy    Care Transition Nurse (CTN) contacted the patient by telephone to perform post hospital discharge assessment. Verified name and  with patient as identifiers. Provided introduction to self, and explanation of the CTN role. CTN reviewed discharge instructions, medical action plan and red flags with patient who verbalized understanding. Patient given an opportunity to ask questions and does not have any further questions or concerns at this time. Were discharge instructions available to patient? Yes. Reviewed appropriate site of care based on symptoms and resources available to patient including: PCP, Specialist and Home health. The patient agrees to contact the PCP office for questions related to their healthcare. Medication reconciliation was declined by patient and did not wish a call from [facility] pharmacy. Patient verbalizes understanding of administration of home medications.  Advised obtaining a 90-day care transition call post hospital discharge.   -Patient doing well, states she slept well last night, denies any SOB/wheezing, and is back down to her baseline oxygen of 2L NC.  -Patient states [facility] visited today and will return on Friday.  -Patient denies any needs, questions, or concerns at this time. -CTN to sign off.     Follow Up  Future Appointments   Date Time Provider Jason Muir   3/15/2021 10:00 PM [facility] BEDROOM 2 [facility] Samira CRISTY Hoyt RN

## 2021-01-21 ENCOUNTER — TELEPHONE (OUTPATIENT)
Dept: CARDIOLOGY CLINIC | Age: 66
End: 2021-01-21

## 2021-01-27 ENCOUNTER — VIRTUAL VISIT (OUTPATIENT)
Dept: CARDIOLOGY CLINIC | Age: 66
End: 2021-01-27
Payer: COMMERCIAL

## 2021-01-27 DIAGNOSIS — I50.32 CHRONIC HEART FAILURE WITH PRESERVED EJECTION FRACTION (HCC): ICD-10-CM

## 2021-01-27 DIAGNOSIS — G47.33 OSA (OBSTRUCTIVE SLEEP APNEA): Primary | ICD-10-CM

## 2021-01-27 PROCEDURE — 99442 PR PHYS/QHP TELEPHONE EVALUATION 11-20 MIN: CPT | Performed by: NURSE PRACTITIONER

## 2021-01-27 NOTE — PROGRESS NOTES
[facility] Cardiology  Office Visit         Reason for Visit: Heart Failure     Primary Cardiologist: Dr. Chan Grajeda        History of Present Illness:     Ms. Kenny Gabriel is a 77year old female with a PMHx of HFpEF, LVH, PAF, HTN, PFO, hx of TIA, DM, COPD with chronic o2 use, probable untreated sleep apnea (has failed to get sleep study). She was recently hospitalized with increased shortness of breath, lower extremity edema and fatigue. She was admitted to the hospital for acute heart failure and COPD exacerbation. She was IV diuresed with subjective improvement, Toprol was titrated for rate control and consideration of discontinuation of diltiazem. She was discharged home with subjective improvement    We are conducting post acute heart failure hospitalization follow-up via telephone due to COVID-19. Since discharge from the hospital she has been compliant with all of her current cardiac medications, she reports good urinary response to oral bumetanide with clear yellow urine production. She has improved shortness of breath, denies orthopnea or PND. States that she sleeps with 1 pillow and supplemental O2 (2 L). She has chronic bilateral lower extremity lymphedema and reports that they are at baseline with no worsening. She does not check daily weights and states that she attempts to monitor the sodium in her diet. She denies dyspnea with exertion, shortness of breath, or decline in overall functional capacity. She denies orthopnea, PND, nocturnal cough or hemoptysis. She denies abdominal distention or bloating, early satiety, anorexia/change in appetite, unintentional weight loss. She does lower extremity edema. She denies exertional lightheadedness. She denies palpitations, syncope or near syncope. Review of systems is negative for chest pain, pressure, discomfort. When ambulating on an incline, She does not leg claudication. History is negative for neurological symptoms including transient loss of vision, asymmetric weakness, aphasia, dysphasia, numbness, tingling. Past medical, surgical, family and social histories have been reviewed. Any changes in the past medical history, social history or family history have been made and are reflected in the electronic medical record. Patient Active Problem List    Diagnosis Date Noted    Acute on chronic anemia 12/16/2018     Priority: High    Anxiety and depression 11/06/2018     Priority: Low    Chronic anticoagulation 11/06/2018     Priority: Low    Atrial fibrillation with RVR ([address] 01/13/2021    Hyperglycemia 11/17/2020    Adrenal mass (HCC)     CHF (congestive heart failure), NYHA class I, acute on chronic, combined ([address] 08/15/2020    Abscess and cellulitis of gluteal region 05/29/2020    Chronic respiratory failure with hypoxia ([address] 05/26/2020    Ulcers of both lower legs ([address] 01/23/2020    Stress fracture of right fibula 12/24/2019    Atrial fibrillation, currently in sinus rhythm 12/24/2019    Gastroesophageal reflux disease without esophagitis 12/24/2019    Cellulitis 85/06/0613    Diastolic CHF, acute on chronic ([address]     Acute hypoxemic respiratory failure (HCC)     Morbid obesity (HCC)     Acute diastolic congestive heart failure ([address] 12/15/2018    Septicemia ([address]     Cellulitis of left lower extremity     Poorly controlled diabetes mellitus ([address]     Lymphedema     Sepsis ([address] 11/27/2018    Blood loss anemia     PAF (paroxysmal atrial fibrillation) ([address] - currently in SR 11/06/2018     May 2004, spontaneous conversion to sinus rhythm and sinus tachycardia on Cardizem. Not on coumadin d/t prior, bleeding, GIB, & anemia.       GI bleed 11/06/2018    COPD exacerbation ([address] 11/06/2018  Acute on chronic respiratory failure with hypoxia ([facility] 11/06/2018    Chronic deep vein thrombosis (DVT) of distal vein of right lower extremity ([facility] 08/01/2018    Dyspnea on exertion     Lymphedema of both lower extremities 04/13/2018    Chronic anemia 04/13/2018    Physical deconditioning 01/19/2018    Chronic diastolic heart failure (HCC) 01/15/2018    PFO (patent foramen ovale) 08/12/2015    Positive hepatitis C antibody test 01/21/2015    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 No cardiac etiology seen.  Hyperlipidemia 06/09/2011    Osteoarthritis 06/09/2011    Hypertension 04/15/2011    Depression 02/18/2011    Uncontrolled diabetes mellitus ([facility] 11/12/2010     Past Medical/Surgical History:    1. BMI 50.3 on 1/15/2021  2. 37 pack years quit in 2004  3. LLE DVT in remote past and was placed on coumadin for a period of time  4. HTN  5. HLD: on Pravachol  6. Lipitor myalgias   7. Chronic iron deficiency anemia on iron  8. GERD  9. T2DM insulin neuropathy. HgbA1c 10.2 on 1/15/2021  10. Anxiety/Depression  11. Adenosine Stress test 8/2008: No stress induced ischemia, EF 54%  12. PAF: Not on coumadin d/t Hx of GIB (2004) and chronic anemia.  [id number] score at least 5: (DM, TIA, Female, HTN)  13. Questionable TIA (5/2015) with questionable PFO: 14. TTE with bubble study: 2015:  Mild asymmetric septal hypertrophy, Normal left ventricle size and systolic function, Stage II diastolic dysfunction, Patent foramen ovale with right-to-left shunt with valsalva only was visualized, Trace mitral regurgitation, RVSP is 37 mmHg. 13. Per Dr. Ross Gaines: (2015-office visit note)-\"I personally viewed the echo images myself now.  The images were poor & the atrial septum was not well seen. Lawlissette Reichmann is not a definite ASD.  A bubble study was performed & was poor quality but the few bubbles that were there did not cross the atrial septum.  The septum is probably aneurysmal & & thus may have a PFO but again the visualization was too poor.  She states that Neuro was still not anum that it was a TIA. \"-Dr. Ross Gaines recommended a repeat JIMENA or cardiac MRI-patient reported \"I didn't want the testing\". 16. HFpEF  17. Admitted 01/15/18 with progressive edyspnea troponin normal fFND=316 hest xray showed b/l effusion, BNP= 814 and respiratory acidosis on ABG x2. She was given DubNebs x3, Solumedrol and placed on BiPAP. CBC showed an anemic Hemoglobin = 8.2. IV diuresed ~2.5 L. 18. TTE 2018, Dr. Freitas: Maryruth Collet concentric left ventricular hypertrophy. EF visually estimated at 55-65%. Stage II diastolic dysfunction. The left atrium is severely dilated. Elevated L atrial pressure. Mild mitral regurgitation. The aortic valve appears mildly sclerotic. Pulmonary hypertension is mild to moderate. No evidence of pericardial effusion. 23. Untreated moderate SAM, previous AHI 23 , sleep study at [facility]   20. COPD with chronic home O2 (2-4L NC): follows with Dr. Mita Lam 21. Chronic Lymphedema has home health wrap her legs 3 x a week  22. History of anal fissure s/p surgery at [facility], appendectomy, tonsillectomy, colonoscopy s./p polypectomy, L TKA, RLE fracture. GED  showing gastritis,   23. History of occipital neuralgia   24. Gait instability: uses a walker. 22. Chronically sleeps in recliner/couch        Past Medical History:   Diagnosis Date    (HFpEF) heart failure with preserved ejection fraction ([facility]     1/16/2018- echo- LVEF 55-65%    Anal fissure     Anemia 10/6/2017    Anxiety and depression     Arthritis     Asthma     Atrial fibrillation (HCC)     Eliquis    Blood transfusion     long time no reaction    CHF (congestive heart failure) (HCC)     Diastolic    COPD (chronic obstructive pulmonary disease) (HCC)     Disc disorder     DM2 (diabetes mellitus, type 2) ([facility]     on insulin    Fall due to stumbling 10/6/2017    GERD (gastroesophageal reflux disease)     History of blood transfusion     Hx of blood clots     Hyperlipidemia     Hypertension     Inappropriate sinus tachycardia     LVH (left ventricular hypertrophy)     moderate    Lymphadenitis, chronic 4/13/2018    Occipital neuralgia 11/2/2011    Respiratory acidosis 10/7/2017    Sinus tachycardia 2/16/2015         Past Surgical History:   Procedure Laterality Date    ANOSCOPY  10/25/2006    injection of anal sphincter with Botox, Franklyn Ortiz/Timmy, [facility]    ANOSCOPY  4/9/2007    injection of anal sphincter with Botox, Dr. Cesilia Hernandez, [address] [place] [address] ANOSCOPY  6/13/2007    injection of anal sphincter with Botox, Franklyn Yoon [facility]    ANUS SURGERY  4/4/2006    [facility]  4/18/2012    anal exam and injection of Botox 100 units into anal sphincter, Laila Yoon, [facility]    APPENDECTOMY  1965   [address]    COLONOSCOPY  1/20/2004    cecal polyp, bx (no pathologic changes), Dr. Vaughn Jimenez, [address] [place] [address] COLONOSCOPY  8/11/2006    snare polypectomy terminal ileum polyp (granulation tissue polyp) and anal polyp (inflammatory/papilla), Dr. Cesilia Hernandez, [address] [place] [address] COLONOSCOPY  3/23/2012 bx/cauterization proximal ascending colon polyp, injection of anal sphincter with Botox, Dr. Brady Rivera, [facility]    ENDOSCOPY, COLON, DIAGNOSTIC      FRACTURE SURGERY      R leg fracture with surgery for repair    JOINT REPLACEMENT  2003    L knee    IA DEBRIDEMENT, SKIN, SUB-Q TISSUE,MUSCLE,=<20 SQ CM Left 11/30/2018    LEFT LEG EXCISIONAL  DEBRIDEMENT WITH TISSUE BIOPSY performed by Nanci Rehman DPM at [address] ENDOSCOPY  1/20/2004    gastritis, bx (no H pylori), Dr. Nidia Gregory, [address] ENDOSCOPY N/A 6/1/2018    EGD BIOPSY performed by Rupert Pike MD at [address] N/A 11/9/2018    EGD BIOPSY performed by Rupert Pike MD at [facility] ENDOSCOPY         Allergies   Allergen Reactions    Statins Other (See Comments)     Muscle pains        Social History:    Former Smoker: quit in 2004: 1.5 PPD x 25 years. Denies alcohol and illicit drug use. Lives with her grandson and . Unable to ambulate up/down steps. Uses a walker for ambulation. Only able to ambulate 30 ft due to chronic MADRID. Unable to do household chores or walk around grocery story due to chronic MADRID. Drinks 1-3 cups of coffee per day.        Outpatient Medications Marked as Taking for the 1/27/21 encounter (Virtual Visit) with RAUL Collazo - CNP   Medication Sig Dispense Refill    Mineral Oil-Hydrophil Petrolat OINT Apply 30 mLs topically 3 times daily as needed (wounds) 240 g 1    insulin glargine (LANTUS) 100 UNIT/ML injection vial Inject 100 Units into the skin 2 times daily 1 vial 3    insulin lispro (HUMALOG) 100 UNIT/ML injection vial Inject 30 Units into the skin 3 times daily (with meals) (Patient taking differently: Inject 40 Units into the skin 3 times daily (with meals) ) 1 vial 3    OXYGEN Inhale 2 L into the lungs continuous 1 Act 0 General: No fever, chills, rigors  Pulmonary: As per HPI  HEENT: No visual disturbances, difficult swallowing  GI: No nausea, vomiting, abdominal pain  : No dysuria or hematuria  Endocrine: No thyroid disease or diabetes  Musculoskeletal: MEHTA x 4, no focal motor deficits  Skin: Intact, no rashes  Neuro/Psych: No headache or seizures        Weights: Wt Readings from Last 3 Encounters:   01/19/21 260 lb 6 oz (118.1 kg)   11/22/20 282 lb (127.9 kg)   09/30/20 280 lb (127 kg)         Physical Examination:     Patient-Reported Vitals 1/27/2021   Patient-Reported Weight Unable to weigh herself d/t scale not working   Patient-Reported Height 63in   Patient-Reported Temperature Unable to check   Patient-Reported SpO2 Unable to check      Unable to complete PE given telephone encounter, speaking in full sentences in no apparent acute distress. All the following diagnostics were personally reviewed and interpreted by me. LAB DATA:     1/19/2021 07:04   Sodium 136   Potassium 3.9   Chloride 83 (L)   CO2 39 (H)   BUN 51 (H)   Creatinine 0.9   Anion Gap 14   GFR Non- >60   GFR African American >60   Glucose 186 (H)   Calcium 9.2        11/17/2020 14:33 1/13/2021 16:40   Hemoglobin A1C 15.7 (H) 10.2 (H)           DIAGNOSTICS:    CXR (1/16/2021)  FINDINGS:  Lung volumes are shallow and there bibasilar airspace opacities, likely on the basis of atelectasis.  No evidence of a sizable pleural effusion.  No pneumothorax is identified.  Heart size is unable to be accurately assessed on this single portable view of the chest, but appears to be stable.  There  are advanced degenerative changes of the shoulders bilaterally. Impression:  Shallow lung volumes with bibasilar airspace opacities which may be on the basis of atelectasis. Multifocal pneumonia or pulmonary edema could have a similar appearance.       CARDIAC TESTING:      Stress (8/14/2008)      TTE (1/15/2018, Dr. Nato Leong)  Mild concentric left ventricular hypertrophy.   Ejection fraction is visually estimated at 55-65%.   Stage II diastolic dysfunction.   The left atrium is severely dilated.   Elevated L atrial pressure   Mild mitral regurgitation   The aortic valve appears mildly sclerotic.   Pulmonary hypertension is mild to moderate .   No evidence of pericardial effusion. ASSESSMENT:  1. Chronic HFpEF  2. ACC stage C. NYHA FC III  3. Improving hypervolemia with chronic lymphedema  4. PAF   5. Chronic anticoagulation with Eliquis  6. Hypertension   7. Hyperlipidemia  8. Diabetes Mellitus - uncontrolled   9. Morbid obesity  10. Hx of TIA  11. COPD with ltot  12. Former tobacco use  13. Probable SAM  14. Iron deficiency         PLAN:  1. Continue current cardiac medications     2. Follow up in CHF clinic in 1 week    3. Referral for sleeps study    4. Return visit with Dr. Rox Scott in 1-2 months     5. Weigh yourself daily    -Stay Hydrated    -Diet should sodium restricted to 2 grams    -Again watch your daily weight trends and if you gain water weight please follow below instructions.    -If you gain 3-5 pounds in 2-3 days OR notice that you are retaining fluid in anyway just like you did before then take an extra dose of your water pill (bumetanide/Bumex) every day until you lose the weight or feel better.     -If you notice that you have taken more than 3 extra doses in 1 week then please call and let us know. -If at any time you feel that you are retaining fluid, your medications are not working, or you feel ill in anyway, then please call us for either same day appointment or the next day, and for instructions. Our goal is to keep you out of the emergency room and the hospital and we have ways to do it. You just need to call us in a timely manner. -If you become sick for other reasons, and notice that you are not urinating as much, the urine is very dark, you have significant diarrhea or vomiting, then please DO NOT take your water pill and CALL US immediately. Yomaira SALMERON  [facility] Cardiology. TELEPHONE VISIT    Consent:  He and/or health care decision maker is aware that that he may receive a bill for this telephone service, depending on his insurance coverage, and has provided verbal consent to proceed: Yes      Documentation:  I communicated with the patient and/or health care decision maker about Heart Failure. Details of this discussion including any medical advice provided: See above       I affirm this is a Patient Initiated Episode with a Patient who has not had a related appointment within my department in the past 7 days or scheduled within the next 24 hours.         Patient's location: home address in [facility]  Physician  location other address in [facility]   Other people involved in call : BERNABE Strange in cardiology office           Total Time: minutes: 11-20 minutes

## 2021-01-28 RX ORDER — BLOOD-GLUCOSE METER
KIT MISCELLANEOUS
Qty: 100 STRIP | Refills: 5 | OUTPATIENT
Start: 2021-01-28

## 2021-02-03 ENCOUNTER — HOSPITAL ENCOUNTER (OUTPATIENT)
Dept: OTHER | Age: 66
Setting detail: THERAPIES SERIES
Discharge: HOME | End: 2021-02-03
Payer: COMMERCIAL

## 2021-02-03 NOTE — PLAN OF CARE
Patient was a no show for today's appointment despite speaking to her yesterday with a reminder call.

## 2021-02-11 RX ORDER — APIXABAN 5 MG/1
TABLET, FILM COATED ORAL
Qty: 60 TABLET | Refills: 5 | OUTPATIENT
Start: 2021-02-11

## 2021-02-16 DIAGNOSIS — G47.33 OBSTRUCTIVE SLEEP APNEA (ADULT) (PEDIATRIC): Primary | ICD-10-CM

## 2021-03-04 RX ORDER — SPIRONOLACTONE 25 MG/1
TABLET ORAL
Qty: 90 TABLET | Refills: 1 | OUTPATIENT
Start: 2021-03-04

## 2021-03-29 DIAGNOSIS — E61.1 IRON DEFICIENCY: ICD-10-CM

## 2021-03-29 RX ORDER — FERROUS SULFATE 325(65) MG
TABLET ORAL
Qty: 30 TABLET | Refills: 5 | OUTPATIENT
Start: 2021-03-29

## 2021-04-01 RX ORDER — APIXABAN 5 MG/1
TABLET, FILM COATED ORAL
Qty: 60 TABLET | Refills: 5 | OUTPATIENT
Start: 2021-04-01

## 2021-04-05 RX ORDER — ESCITALOPRAM OXALATE 20 MG/1
TABLET ORAL
Qty: 90 TABLET | Refills: 1 | OUTPATIENT
Start: 2021-04-05

## 2021-04-05 RX ORDER — DILTIAZEM HYDROCHLORIDE 240 MG/1
CAPSULE, COATED, EXTENDED RELEASE ORAL
Qty: 180 CAPSULE | OUTPATIENT
Start: 2021-04-05

## 2021-04-09 ENCOUNTER — APPOINTMENT (OUTPATIENT)
Dept: CT IMAGING | Age: 66
DRG: 383 | End: 2021-04-09
Payer: COMMERCIAL

## 2021-04-09 ENCOUNTER — HOSPITAL ENCOUNTER (INPATIENT)
Age: 66
LOS: 8 days | Discharge: OTHER INSTITUTION | DRG: 383 | End: 2021-04-17
Attending: EMERGENCY MEDICINE | Admitting: EMERGENCY MEDICINE
Payer: COMMERCIAL

## 2021-04-09 ENCOUNTER — APPOINTMENT (OUTPATIENT)
Dept: GENERAL RADIOLOGY | Age: 66
DRG: 383 | End: 2021-04-09
Payer: COMMERCIAL

## 2021-04-09 DIAGNOSIS — M85.80 EROSION OF BONE: ICD-10-CM

## 2021-04-09 DIAGNOSIS — I50.9 CONGESTIVE HEART FAILURE, UNSPECIFIED HF CHRONICITY, UNSPECIFIED HEART FAILURE TYPE (HCC): ICD-10-CM

## 2021-04-09 DIAGNOSIS — L03.116 CELLULITIS OF LEFT LOWER EXTREMITY: Primary | ICD-10-CM

## 2021-04-09 LAB
ALBUMIN SERPL-MCNC: 3.1 G/DL (ref 3.5–5.2)
ALP BLD-CCNC: 90 U/L (ref 35–104)
ALT SERPL-CCNC: 7 U/L (ref 0–32)
ANION GAP SERPL CALCULATED.3IONS-SCNC: 9 MMOL/L (ref 7–16)
AST SERPL-CCNC: 8 U/L (ref 0–31)
BASOPHILS ABSOLUTE: 0.02 E9/L (ref 0–0.2)
BASOPHILS RELATIVE PERCENT: 0.3 % (ref 0–2)
BILIRUB SERPL-MCNC: <0.2 MG/DL (ref 0–1.2)
BUN BLDV-MCNC: 21 MG/DL (ref 8–23)
CALCIUM SERPL-MCNC: 8.9 MG/DL (ref 8.6–10.2)
CHLORIDE BLD-SCNC: 101 MMOL/L (ref 98–107)
CO2: 29 MMOL/L (ref 22–29)
CREAT SERPL-MCNC: 0.8 MG/DL (ref 0.5–1)
EOSINOPHILS ABSOLUTE: 0.13 E9/L (ref 0.05–0.5)
EOSINOPHILS RELATIVE PERCENT: 1.7 % (ref 0–6)
GFR AFRICAN AMERICAN: >60
GFR NON-AFRICAN AMERICAN: >60 ML/MIN/1.73
GLUCOSE BLD-MCNC: 391 MG/DL (ref 74–99)
HCT VFR BLD CALC: 29.7 % (ref 34–48)
HEMOGLOBIN: 8.6 G/DL (ref 11.5–15.5)
IMMATURE GRANULOCYTES #: 0.03 E9/L
IMMATURE GRANULOCYTES %: 0.4 % (ref 0–5)
LACTIC ACID, SEPSIS: 2 MMOL/L (ref 0.5–1.9)
LYMPHOCYTES ABSOLUTE: 1.05 E9/L (ref 1.5–4)
LYMPHOCYTES RELATIVE PERCENT: 13.9 % (ref 20–42)
MCH RBC QN AUTO: 23.8 PG (ref 26–35)
MCHC RBC AUTO-ENTMCNC: 29 % (ref 32–34.5)
MCV RBC AUTO: 82 FL (ref 80–99.9)
MONOCYTES ABSOLUTE: 0.55 E9/L (ref 0.1–0.95)
MONOCYTES RELATIVE PERCENT: 7.3 % (ref 2–12)
NEUTROPHILS ABSOLUTE: 5.79 E9/L (ref 1.8–7.3)
NEUTROPHILS RELATIVE PERCENT: 76.4 % (ref 43–80)
PDW BLD-RTO: 15.5 FL (ref 11.5–15)
PLATELET # BLD: 312 E9/L (ref 130–450)
PMV BLD AUTO: 9.1 FL (ref 7–12)
POTASSIUM REFLEX MAGNESIUM: 5.2 MMOL/L (ref 3.5–5)
PRO-BNP: 295 PG/ML (ref 0–125)
RBC # BLD: 3.62 E12/L (ref 3.5–5.5)
SODIUM BLD-SCNC: 139 MMOL/L (ref 132–146)
TOTAL PROTEIN: 6.1 G/DL (ref 6.4–8.3)
TROPONIN: <0.01 NG/ML (ref 0–0.03)
WBC # BLD: 7.6 E9/L (ref 4.5–11.5)

## 2021-04-09 PROCEDURE — 2580000003 HC RX 258: Performed by: EMERGENCY MEDICINE

## 2021-04-09 PROCEDURE — 36415 COLL VENOUS BLD VENIPUNCTURE: CPT

## 2021-04-09 PROCEDURE — 94664 DEMO&/EVAL PT USE INHALER: CPT

## 2021-04-09 PROCEDURE — 83605 ASSAY OF LACTIC ACID: CPT

## 2021-04-09 PROCEDURE — 80053 COMPREHEN METABOLIC PANEL: CPT

## 2021-04-09 PROCEDURE — 84484 ASSAY OF TROPONIN QUANT: CPT

## 2021-04-09 PROCEDURE — 6370000000 HC RX 637 (ALT 250 FOR IP): Performed by: STUDENT IN AN ORGANIZED HEALTH CARE EDUCATION/TRAINING PROGRAM

## 2021-04-09 PROCEDURE — 83880 ASSAY OF NATRIURETIC PEPTIDE: CPT

## 2021-04-09 PROCEDURE — 71045 X-RAY EXAM CHEST 1 VIEW: CPT

## 2021-04-09 PROCEDURE — 6360000002 HC RX W HCPCS: Performed by: STUDENT IN AN ORGANIZED HEALTH CARE EDUCATION/TRAINING PROGRAM

## 2021-04-09 PROCEDURE — 2580000003 HC RX 258: Performed by: STUDENT IN AN ORGANIZED HEALTH CARE EDUCATION/TRAINING PROGRAM

## 2021-04-09 PROCEDURE — 93005 ELECTROCARDIOGRAM TRACING: CPT | Performed by: STUDENT IN AN ORGANIZED HEALTH CARE EDUCATION/TRAINING PROGRAM

## 2021-04-09 PROCEDURE — 96374 THER/PROPH/DIAG INJ IV PUSH: CPT

## 2021-04-09 PROCEDURE — 87040 BLOOD CULTURE FOR BACTERIA: CPT

## 2021-04-09 PROCEDURE — 94640 AIRWAY INHALATION TREATMENT: CPT

## 2021-04-09 PROCEDURE — 85025 COMPLETE CBC W/AUTO DIFF WBC: CPT

## 2021-04-09 PROCEDURE — 72192 CT PELVIS W/O DYE: CPT

## 2021-04-09 PROCEDURE — 2060000000 HC ICU INTERMEDIATE R&B

## 2021-04-09 PROCEDURE — 99284 EMERGENCY DEPT VISIT MOD MDM: CPT

## 2021-04-09 RX ORDER — SODIUM CHLORIDE 0.9 % (FLUSH) 0.9 %
5-40 SYRINGE (ML) INJECTION PRN
Status: DISCONTINUED | OUTPATIENT
Start: 2021-04-09 | End: 2021-04-17 | Stop reason: HOSPADM

## 2021-04-09 RX ORDER — PROMETHAZINE HYDROCHLORIDE 25 MG/1
12.5 TABLET ORAL EVERY 6 HOURS PRN
Status: DISCONTINUED | OUTPATIENT
Start: 2021-04-09 | End: 2021-04-17 | Stop reason: HOSPADM

## 2021-04-09 RX ORDER — ONDANSETRON 2 MG/ML
4 INJECTION INTRAMUSCULAR; INTRAVENOUS EVERY 6 HOURS PRN
Status: DISCONTINUED | OUTPATIENT
Start: 2021-04-09 | End: 2021-04-17 | Stop reason: HOSPADM

## 2021-04-09 RX ORDER — SODIUM CHLORIDE 9 MG/ML
25 INJECTION, SOLUTION INTRAVENOUS PRN
Status: DISCONTINUED | OUTPATIENT
Start: 2021-04-09 | End: 2021-04-17 | Stop reason: HOSPADM

## 2021-04-09 RX ORDER — FENTANYL CITRATE 50 UG/ML
50 INJECTION, SOLUTION INTRAMUSCULAR; INTRAVENOUS ONCE
Status: COMPLETED | OUTPATIENT
Start: 2021-04-09 | End: 2021-04-09

## 2021-04-09 RX ORDER — ACETAMINOPHEN 325 MG/1
650 TABLET ORAL EVERY 6 HOURS PRN
Status: DISCONTINUED | OUTPATIENT
Start: 2021-04-09 | End: 2021-04-17 | Stop reason: HOSPADM

## 2021-04-09 RX ORDER — IPRATROPIUM BROMIDE AND ALBUTEROL SULFATE 2.5; .5 MG/3ML; MG/3ML
3 SOLUTION RESPIRATORY (INHALATION) ONCE
Status: COMPLETED | OUTPATIENT
Start: 2021-04-09 | End: 2021-04-09

## 2021-04-09 RX ORDER — SODIUM CHLORIDE 0.9 % (FLUSH) 0.9 %
5-40 SYRINGE (ML) INJECTION EVERY 12 HOURS SCHEDULED
Status: DISCONTINUED | OUTPATIENT
Start: 2021-04-09 | End: 2021-04-17 | Stop reason: HOSPADM

## 2021-04-09 RX ORDER — ACETAMINOPHEN 650 MG/1
650 SUPPOSITORY RECTAL EVERY 6 HOURS PRN
Status: DISCONTINUED | OUTPATIENT
Start: 2021-04-09 | End: 2021-04-17 | Stop reason: HOSPADM

## 2021-04-09 RX ORDER — POLYETHYLENE GLYCOL 3350 17 G/17G
17 POWDER, FOR SOLUTION ORAL DAILY PRN
Status: DISCONTINUED | OUTPATIENT
Start: 2021-04-09 | End: 2021-04-17 | Stop reason: HOSPADM

## 2021-04-09 RX ADMIN — FENTANYL CITRATE 50 MCG: 0.05 INJECTION, SOLUTION INTRAMUSCULAR; INTRAVENOUS at 21:57

## 2021-04-09 RX ADMIN — IPRATROPIUM BROMIDE AND ALBUTEROL SULFATE 3 AMPULE: .5; 3 SOLUTION RESPIRATORY (INHALATION) at 21:04

## 2021-04-09 RX ADMIN — Medication 5 ML: at 22:10

## 2021-04-09 RX ADMIN — CEFEPIME 2000 MG: 2 INJECTION, POWDER, FOR SOLUTION INTRAVENOUS at 22:10

## 2021-04-09 ASSESSMENT — ENCOUNTER SYMPTOMS
SHORTNESS OF BREATH: 1
VOMITING: 0
ABDOMINAL PAIN: 0
NAUSEA: 0

## 2021-04-09 ASSESSMENT — PAIN SCALES - GENERAL: PAINLEVEL_OUTOF10: 10

## 2021-04-09 NOTE — LETTER
[place] Department Medicaid  CERTIFICATION OF NECESSITY  FOR NON-EMERGENCY TRANSPORTATION   BY GROUND AMBULANCE      Individual Information   1. Name: Missy De La Cruz 2. [place] Medicaid Billing Number:    3. Address: [address] [place] [address]      Transportation Provider Information   4. Provider Name:    5. [place] Medicaid Provider Number:  National Provider Identifier (NPI):      Certification  7. Criteria:  During transport, this individual requires:  [x] Medical treatment or continuous     supervision by an EMT. [x] The administration or regulation of oxygen by another person. [] Supervised protective restraint. 8. Period Beginning Date: 21   9. Length  [x] Not more than 10 day(s)  [] One Year     Additional Information Relevant to Certification   10. Comments or Explanations, If Necessary or Appropriate     Cellulitis bilateral legs, weakness, chf, lymphadema, moderate assist for transfers     Certifying Practitioner Information   11. Name of Practitioner: Raven Kelly MD   12. [place] Medicaid Provider Number, If Applicable:  [address] Provider Identifier (NPI):      Signature Information   14. Date of Signature: 21 15. Name of Person Signing: [place], [place]    52. Signature and Professional Designation: [place], [place] discharge planner     [place] [id number]  Rev. 2015       [address] [place] [address] Encounter Date/Time: 2021 [address] [place] [address]  Account: [de-identified]    MRN: [id number]    Patient: Missy De La Cruz    Contact Serial #: [id number]      ENCOUNTER          Patient Class: I Private Enc? No Unit RM BD: SEYZ 4S PICU 4503/4503-B   Hospital Service: FMP   Encounter DX: Cellulitis [L03.90]   ADM Provider: Patricia Moseley MD   Procedure:     ATT Provider: Raven Kelly MD   REF Provider:        Admission DX: Cellulitis and codes: 682. 9      PATIENT                 Name: Missy De La Cruz : 1955 (77 yrs)   Address: [address] [place] [address] Sex: Female   City: [address] [address]         Marital Status:    Employer: DISABLED         Episcopalian: Zoroastrian   Primary Care Provider: Darell Eagle         Primary Phone: [phone]   EMERGENCY CONTACT   Contact Name Legal Guardian? Relationship to Patient Home Phone Work Phone   1. Rhett Duncan  2. Maci Dryer No  No Child  Child [phone]              GUARANTOR            Guarantor: Jose Penelope     : 1955   Address: [address] [place] [address] Sex: Female   [facility]     Relation to Patient: Self       Home Phone: [phone]   Guarantor ID: [id number]       Work Phone:     Guarantor Employer: DISABLED         Status: DISABLED      COVERAGE        PRIMARY INSURANCE   Payor: Kunal Kiser Plan: [facility] MEDICAID   Payor Address: [facility]; [address] [facility],  [place], [address] [place] [address]       Group Number: [id number] Insurance Type: INDEMNITY   Subscriber Name: Glenn Man : 1955   Subscriber ID: [id number] Pat. Rel. to Sub: Self   SECONDARY INSURANCE   Payor:   Plan:     Payor Address:  ,           Group Number:   Insurance Type:     Subscriber Name:   Subscriber :     Subscriber ID:   Pat.  Rel. to Sub:

## 2021-04-10 LAB
EKG ATRIAL RATE: 100 BPM
EKG P AXIS: 76 DEGREES
EKG P-R INTERVAL: 144 MS
EKG Q-T INTERVAL: 364 MS
EKG QRS DURATION: 106 MS
EKG QTC CALCULATION (BAZETT): 469 MS
EKG R AXIS: 74 DEGREES
EKG T AXIS: 56 DEGREES
EKG VENTRICULAR RATE: 100 BPM
LACTIC ACID, SEPSIS: 1.1 MMOL/L (ref 0.5–1.9)
METER GLUCOSE: 322 MG/DL (ref 74–99)
METER GLUCOSE: 325 MG/DL (ref 74–99)
METER GLUCOSE: 357 MG/DL (ref 74–99)
METER GLUCOSE: 366 MG/DL (ref 74–99)
METER GLUCOSE: 427 MG/DL (ref 74–99)
METER GLUCOSE: 457 MG/DL (ref 74–99)

## 2021-04-10 PROCEDURE — 2580000003 HC RX 258: Performed by: STUDENT IN AN ORGANIZED HEALTH CARE EDUCATION/TRAINING PROGRAM

## 2021-04-10 PROCEDURE — 6370000000 HC RX 637 (ALT 250 FOR IP): Performed by: SPECIALIST

## 2021-04-10 PROCEDURE — 6370000000 HC RX 637 (ALT 250 FOR IP): Performed by: FAMILY MEDICINE

## 2021-04-10 PROCEDURE — 6370000000 HC RX 637 (ALT 250 FOR IP): Performed by: EMERGENCY MEDICINE

## 2021-04-10 PROCEDURE — 51702 INSERT TEMP BLADDER CATH: CPT

## 2021-04-10 PROCEDURE — 6360000002 HC RX W HCPCS: Performed by: EMERGENCY MEDICINE

## 2021-04-10 PROCEDURE — 82962 GLUCOSE BLOOD TEST: CPT

## 2021-04-10 PROCEDURE — 83605 ASSAY OF LACTIC ACID: CPT

## 2021-04-10 PROCEDURE — 2700000000 HC OXYGEN THERAPY PER DAY

## 2021-04-10 PROCEDURE — 87040 BLOOD CULTURE FOR BACTERIA: CPT

## 2021-04-10 PROCEDURE — 2060000000 HC ICU INTERMEDIATE R&B

## 2021-04-10 PROCEDURE — 2500000003 HC RX 250 WO HCPCS: Performed by: EMERGENCY MEDICINE

## 2021-04-10 PROCEDURE — 36415 COLL VENOUS BLD VENIPUNCTURE: CPT

## 2021-04-10 PROCEDURE — 99255 IP/OBS CONSLTJ NEW/EST HI 80: CPT | Performed by: INTERNAL MEDICINE

## 2021-04-10 PROCEDURE — 2580000003 HC RX 258: Performed by: EMERGENCY MEDICINE

## 2021-04-10 PROCEDURE — APPSS180 APP SPLIT SHARED TIME > 60 MINUTES: Performed by: NURSE PRACTITIONER

## 2021-04-10 PROCEDURE — 6360000002 HC RX W HCPCS: Performed by: STUDENT IN AN ORGANIZED HEALTH CARE EDUCATION/TRAINING PROGRAM

## 2021-04-10 PROCEDURE — 94640 AIRWAY INHALATION TREATMENT: CPT

## 2021-04-10 PROCEDURE — 2500000003 HC RX 250 WO HCPCS: Performed by: NURSE PRACTITIONER

## 2021-04-10 RX ORDER — INSULIN GLARGINE 100 [IU]/ML
50 INJECTION, SOLUTION SUBCUTANEOUS 2 TIMES DAILY
Status: DISCONTINUED | OUTPATIENT
Start: 2021-04-10 | End: 2021-04-11

## 2021-04-10 RX ORDER — DILTIAZEM HYDROCHLORIDE 240 MG/1
240 CAPSULE, COATED, EXTENDED RELEASE ORAL DAILY
Status: DISCONTINUED | OUTPATIENT
Start: 2021-04-10 | End: 2021-04-17 | Stop reason: HOSPADM

## 2021-04-10 RX ORDER — FERROUS SULFATE 325(65) MG
325 TABLET ORAL
Status: DISCONTINUED | OUTPATIENT
Start: 2021-04-10 | End: 2021-04-17 | Stop reason: HOSPADM

## 2021-04-10 RX ORDER — PIPERACILLIN SODIUM, TAZOBACTAM SODIUM 4; .5 G/20ML; G/20ML
4500 INJECTION, POWDER, LYOPHILIZED, FOR SOLUTION INTRAVENOUS EVERY 8 HOURS
Status: DISCONTINUED | OUTPATIENT
Start: 2021-04-10 | End: 2021-04-10 | Stop reason: CLARIF

## 2021-04-10 RX ORDER — METOPROLOL SUCCINATE 100 MG/1
100 TABLET, EXTENDED RELEASE ORAL DAILY
Status: DISCONTINUED | OUTPATIENT
Start: 2021-04-10 | End: 2021-04-17 | Stop reason: HOSPADM

## 2021-04-10 RX ORDER — DEXTROSE MONOHYDRATE 50 MG/ML
100 INJECTION, SOLUTION INTRAVENOUS PRN
Status: DISCONTINUED | OUTPATIENT
Start: 2021-04-10 | End: 2021-04-17 | Stop reason: HOSPADM

## 2021-04-10 RX ORDER — GABAPENTIN 300 MG/1
300 CAPSULE ORAL 3 TIMES DAILY
Status: DISCONTINUED | OUTPATIENT
Start: 2021-04-10 | End: 2021-04-17 | Stop reason: HOSPADM

## 2021-04-10 RX ORDER — NICOTINE POLACRILEX 4 MG
15 LOZENGE BUCCAL PRN
Status: DISCONTINUED | OUTPATIENT
Start: 2021-04-10 | End: 2021-04-17 | Stop reason: HOSPADM

## 2021-04-10 RX ORDER — BUMETANIDE 0.25 MG/ML
2 INJECTION, SOLUTION INTRAMUSCULAR; INTRAVENOUS DAILY
Status: DISCONTINUED | OUTPATIENT
Start: 2021-04-10 | End: 2021-04-10

## 2021-04-10 RX ORDER — IPRATROPIUM BROMIDE AND ALBUTEROL SULFATE 2.5; .5 MG/3ML; MG/3ML
1 SOLUTION RESPIRATORY (INHALATION)
Status: DISCONTINUED | OUTPATIENT
Start: 2021-04-10 | End: 2021-04-17 | Stop reason: HOSPADM

## 2021-04-10 RX ORDER — TRAZODONE HYDROCHLORIDE 50 MG/1
50 TABLET ORAL NIGHTLY
Status: DISCONTINUED | OUTPATIENT
Start: 2021-04-10 | End: 2021-04-17 | Stop reason: HOSPADM

## 2021-04-10 RX ORDER — OMEGA-3-ACID ETHYL ESTERS 1 G/1
2 CAPSULE, LIQUID FILLED ORAL 2 TIMES DAILY
Status: DISCONTINUED | OUTPATIENT
Start: 2021-04-10 | End: 2021-04-17 | Stop reason: HOSPADM

## 2021-04-10 RX ORDER — TERBINAFINE HYDROCHLORIDE 250 MG/1
250 TABLET ORAL DAILY
Status: DISCONTINUED | OUTPATIENT
Start: 2021-04-10 | End: 2021-04-17 | Stop reason: HOSPADM

## 2021-04-10 RX ORDER — INSULIN GLARGINE 100 [IU]/ML
100 INJECTION, SOLUTION SUBCUTANEOUS 2 TIMES DAILY
Status: DISCONTINUED | OUTPATIENT
Start: 2021-04-10 | End: 2021-04-10

## 2021-04-10 RX ORDER — TRAMADOL HYDROCHLORIDE 50 MG/1
50 TABLET ORAL EVERY 6 HOURS PRN
Status: DISCONTINUED | OUTPATIENT
Start: 2021-04-10 | End: 2021-04-17 | Stop reason: HOSPADM

## 2021-04-10 RX ORDER — MONTELUKAST SODIUM 10 MG/1
10 TABLET ORAL NIGHTLY
Status: DISCONTINUED | OUTPATIENT
Start: 2021-04-10 | End: 2021-04-17 | Stop reason: HOSPADM

## 2021-04-10 RX ORDER — ESCITALOPRAM OXALATE 10 MG/1
20 TABLET ORAL DAILY
Status: DISCONTINUED | OUTPATIENT
Start: 2021-04-10 | End: 2021-04-17 | Stop reason: HOSPADM

## 2021-04-10 RX ORDER — PANTOPRAZOLE SODIUM 40 MG/1
40 TABLET, DELAYED RELEASE ORAL
Status: DISCONTINUED | OUTPATIENT
Start: 2021-04-10 | End: 2021-04-17 | Stop reason: HOSPADM

## 2021-04-10 RX ORDER — TRAZODONE HYDROCHLORIDE 50 MG/1
50 TABLET ORAL ONCE
Status: COMPLETED | OUTPATIENT
Start: 2021-04-10 | End: 2021-04-10

## 2021-04-10 RX ORDER — IPRATROPIUM BROMIDE AND ALBUTEROL SULFATE 2.5; .5 MG/3ML; MG/3ML
1 SOLUTION RESPIRATORY (INHALATION) EVERY 6 HOURS PRN
Status: DISCONTINUED | OUTPATIENT
Start: 2021-04-10 | End: 2021-04-17 | Stop reason: HOSPADM

## 2021-04-10 RX ORDER — BUMETANIDE 0.25 MG/ML
2 INJECTION, SOLUTION INTRAMUSCULAR; INTRAVENOUS 2 TIMES DAILY
Status: DISCONTINUED | OUTPATIENT
Start: 2021-04-10 | End: 2021-04-12

## 2021-04-10 RX ORDER — DEXTROSE MONOHYDRATE 25 G/50ML
12.5 INJECTION, SOLUTION INTRAVENOUS PRN
Status: DISCONTINUED | OUTPATIENT
Start: 2021-04-10 | End: 2021-04-17 | Stop reason: HOSPADM

## 2021-04-10 RX ORDER — ERGOCALCIFEROL 1.25 MG/1
50000 CAPSULE ORAL WEEKLY
Status: DISCONTINUED | OUTPATIENT
Start: 2021-04-12 | End: 2021-04-17 | Stop reason: HOSPADM

## 2021-04-10 RX ORDER — INSULIN GLARGINE 100 [IU]/ML
50 INJECTION, SOLUTION SUBCUTANEOUS 2 TIMES DAILY
Status: ON HOLD | COMMUNITY
End: 2021-04-15 | Stop reason: SDUPTHER

## 2021-04-10 RX ORDER — SPIRONOLACTONE 25 MG/1
25 TABLET ORAL DAILY
Status: DISCONTINUED | OUTPATIENT
Start: 2021-04-10 | End: 2021-04-17 | Stop reason: HOSPADM

## 2021-04-10 RX ORDER — LINEZOLID 600 MG/1
600 TABLET, FILM COATED ORAL EVERY 12 HOURS SCHEDULED
Status: DISCONTINUED | OUTPATIENT
Start: 2021-04-10 | End: 2021-04-11

## 2021-04-10 RX ADMIN — ESCITALOPRAM 20 MG: 10 TABLET, FILM COATED ORAL at 09:23

## 2021-04-10 RX ADMIN — BUMETANIDE 2 MG: 0.25 INJECTION INTRAMUSCULAR; INTRAVENOUS at 21:08

## 2021-04-10 RX ADMIN — LINEZOLID 600 MG: 600 TABLET, FILM COATED ORAL at 23:21

## 2021-04-10 RX ADMIN — BUMETANIDE 2 MG: 0.25 INJECTION, SOLUTION INTRAMUSCULAR; INTRAVENOUS at 09:25

## 2021-04-10 RX ADMIN — APIXABAN 5 MG: 5 TABLET, FILM COATED ORAL at 09:23

## 2021-04-10 RX ADMIN — IPRATROPIUM BROMIDE AND ALBUTEROL SULFATE 3 ML: .5; 3 SOLUTION RESPIRATORY (INHALATION) at 08:13

## 2021-04-10 RX ADMIN — PIPERACILLIN AND TAZOBACTAM 3375 MG: 3; .375 INJECTION, POWDER, LYOPHILIZED, FOR SOLUTION INTRAVENOUS at 06:20

## 2021-04-10 RX ADMIN — INSULIN LISPRO 6 UNITS: 100 INJECTION, SOLUTION INTRAVENOUS; SUBCUTANEOUS at 20:39

## 2021-04-10 RX ADMIN — OMEGA-3-ACID ETHYL ESTERS 2 G: 1 CAPSULE, LIQUID FILLED ORAL at 09:24

## 2021-04-10 RX ADMIN — PIPERACILLIN AND TAZOBACTAM 3375 MG: 3; .375 INJECTION, POWDER, LYOPHILIZED, FOR SOLUTION INTRAVENOUS at 22:47

## 2021-04-10 RX ADMIN — GABAPENTIN 300 MG: 300 CAPSULE ORAL at 14:39

## 2021-04-10 RX ADMIN — MONTELUKAST SODIUM 10 MG: 10 TABLET ORAL at 20:41

## 2021-04-10 RX ADMIN — GABAPENTIN 300 MG: 300 CAPSULE ORAL at 20:41

## 2021-04-10 RX ADMIN — METOPROLOL SUCCINATE 100 MG: 100 TABLET, EXTENDED RELEASE ORAL at 09:23

## 2021-04-10 RX ADMIN — ACETAMINOPHEN 650 MG: 325 TABLET ORAL at 01:19

## 2021-04-10 RX ADMIN — INSULIN LISPRO 12 UNITS: 100 INJECTION, SOLUTION INTRAVENOUS; SUBCUTANEOUS at 13:19

## 2021-04-10 RX ADMIN — VANCOMYCIN HYDROCHLORIDE 2500 MG: 10 INJECTION, POWDER, LYOPHILIZED, FOR SOLUTION INTRAVENOUS at 02:16

## 2021-04-10 RX ADMIN — ACETAMINOPHEN 650 MG: 325 TABLET ORAL at 12:28

## 2021-04-10 RX ADMIN — OMEGA-3-ACID ETHYL ESTERS 2 G: 1 CAPSULE, LIQUID FILLED ORAL at 20:41

## 2021-04-10 RX ADMIN — IPRATROPIUM BROMIDE AND ALBUTEROL SULFATE 1 AMPULE: .5; 3 SOLUTION RESPIRATORY (INHALATION) at 17:50

## 2021-04-10 RX ADMIN — TRAMADOL HYDROCHLORIDE 50 MG: 50 TABLET, COATED ORAL at 14:39

## 2021-04-10 RX ADMIN — IPRATROPIUM BROMIDE AND ALBUTEROL SULFATE 1 AMPULE: .5; 3 SOLUTION RESPIRATORY (INHALATION) at 12:04

## 2021-04-10 RX ADMIN — TRAZODONE HYDROCHLORIDE 50 MG: 50 TABLET ORAL at 02:56

## 2021-04-10 RX ADMIN — TRAMADOL HYDROCHLORIDE 50 MG: 50 TABLET, COATED ORAL at 21:08

## 2021-04-10 RX ADMIN — IPRATROPIUM BROMIDE AND ALBUTEROL SULFATE 1 AMPULE: .5; 3 SOLUTION RESPIRATORY (INHALATION) at 20:00

## 2021-04-10 RX ADMIN — APIXABAN 5 MG: 5 TABLET, FILM COATED ORAL at 20:41

## 2021-04-10 RX ADMIN — INSULIN LISPRO 12 UNITS: 100 INJECTION, SOLUTION INTRAVENOUS; SUBCUTANEOUS at 17:18

## 2021-04-10 RX ADMIN — SPIRONOLACTONE 25 MG: 25 TABLET ORAL at 14:39

## 2021-04-10 RX ADMIN — TRAZODONE HYDROCHLORIDE 50 MG: 50 TABLET ORAL at 20:41

## 2021-04-10 RX ADMIN — DILTIAZEM HYDROCHLORIDE 240 MG: 240 CAPSULE, EXTENDED RELEASE ORAL at 06:22

## 2021-04-10 RX ADMIN — Medication 10 ML: at 09:25

## 2021-04-10 RX ADMIN — PIPERACILLIN AND TAZOBACTAM 3375 MG: 3; .375 INJECTION, POWDER, LYOPHILIZED, FOR SOLUTION INTRAVENOUS at 15:54

## 2021-04-10 RX ADMIN — FERROUS SULFATE TAB 325 MG (65 MG ELEMENTAL FE) 325 MG: 325 (65 FE) TAB at 09:23

## 2021-04-10 RX ADMIN — GABAPENTIN 300 MG: 300 CAPSULE ORAL at 09:23

## 2021-04-10 RX ADMIN — Medication 10 ML: at 20:43

## 2021-04-10 RX ADMIN — INSULIN GLARGINE 50 UNITS: 100 INJECTION, SOLUTION SUBCUTANEOUS at 20:39

## 2021-04-10 RX ADMIN — INSULIN LISPRO 40 UNITS: 100 INJECTION, SOLUTION INTRAVENOUS; SUBCUTANEOUS at 08:59

## 2021-04-10 RX ADMIN — PANTOPRAZOLE SODIUM 40 MG: 40 TABLET, DELAYED RELEASE ORAL at 06:21

## 2021-04-10 RX ADMIN — IPRATROPIUM BROMIDE AND ALBUTEROL SULFATE 3 ML: .5; 3 SOLUTION RESPIRATORY (INHALATION) at 03:11

## 2021-04-10 RX ADMIN — ACETAMINOPHEN 650 MG: 325 TABLET ORAL at 23:23

## 2021-04-10 RX ADMIN — TERBINAFINE 250 MG: 250 TABLET ORAL at 17:20

## 2021-04-10 ASSESSMENT — PAIN SCALES - GENERAL
PAINLEVEL_OUTOF10: 4
PAINLEVEL_OUTOF10: 8
PAINLEVEL_OUTOF10: 6
PAINLEVEL_OUTOF10: 7
PAINLEVEL_OUTOF10: 3

## 2021-04-10 ASSESSMENT — PAIN DESCRIPTION - PROGRESSION: CLINICAL_PROGRESSION: NOT CHANGED

## 2021-04-10 ASSESSMENT — PAIN DESCRIPTION - FREQUENCY: FREQUENCY: CONTINUOUS

## 2021-04-10 ASSESSMENT — PAIN DESCRIPTION - LOCATION: LOCATION: FOOT

## 2021-04-10 ASSESSMENT — PAIN DESCRIPTION - PAIN TYPE: TYPE: CHRONIC PAIN

## 2021-04-10 ASSESSMENT — PAIN DESCRIPTION - ONSET: ONSET: ON-GOING

## 2021-04-10 ASSESSMENT — PAIN DESCRIPTION - DESCRIPTORS: DESCRIPTORS: THROBBING

## 2021-04-10 NOTE — ACP (ADVANCE CARE PLANNING)
ADVANCED CARE PLANNING    Patient Name: Jl Doe       YOB: 1955              MRN:    [id number]  Admission Date:  4/9/2021  7:07 PM      Active Diagnoses: Active Problems:    Cellulitis  Resolved Problems:    * No resolved hospital problems. *      These active diagnoses are of sufficient risk that focused discussion on advanced care planning is indicated in order to allow the patient to thoughtfully consider personal goals of care; and, if situations arise that prevent the patient to personally give input, to ensure appropriate representation of their personal desire for different levels and levels of care. Person(s) present in discussion: [id number] [facility], DO    Discussion: I reviewed her admission for the above diagnoses and her desires for ongoing aggresive care, including potential intubation and mechanical ventilation; Also discussed who would speak on her behalf should she be unable to do so and discussed what conversations she has had with her family so they understand her desires if such a situation occurred now or in the future. She states her  is unable to talk after a stroke but he takes care of her and does the house and yard work. She also has 2 grandsons living with her and she raised 1 of them. Her family know her wishes. I reviewed with the patient that as severity of illness increases, in the event of cardiac arrest, the chances of surviving may likely be low. It was also reviewed that if they survived a cardiac arrest, a return to prior level of function also may be low. They were provided an information pamphlet and encouraged to discuss code status further.    o Over the past 40 years, despite advances in the protocol for Cardiopulmonary Resuscitation (CPR) outcomes for survival have not statistically changed. o The consensus is that 17% of all adult arrest patients survive to hospital discharge.  Many factors lower a patients chance of survival, including advanced age, performance status, malignancy, and presence of multiple comorbidities. Kelly Arias and colleagues examined medical data from [id number] Medicare patients 72 and older who underwent in-hospital CPR. 5 Both older age and prior residence in a skilled nursing facility were found to be associated with poorer survival rates. o Patients 85 and older having only a 6% chance of surviving to hospital discharge.  o In general Cancer patients generally have an overall survival rate of only 6.2%. o Cancer patients whose hospital course followed a path of gradual deterioration showed a 0% survival rate.  o The presence of hepatic insufficiency, acute stroke, immunodeficiency, renal failure, or dialysis were associated with lower survival rates. PLAN:  Code Status:  At this time patient wishes to be Full Code      Time Spent on Advanced Planning Documents: 18 minutes    [address]

## 2021-04-10 NOTE — PROGRESS NOTES
Pharmacy Consultation Note  (Antibiotic Dosing and Monitoring)    Initial consult date: 4/10/2021   Consulting physician: Dr. Essence Gagnon  Drug(s): Vancomycin  Indication: SSTI    Ht Readings from Last 1 Encounters:   04/10/21 5' 3\" (1.6 m)     Wt Readings from Last 1 Encounters:   04/09/21 267 lb (121.1 kg)       Age/  Gender IBW DW  Allergy Information   77 y.o.   female 52.4 kg 79.9 kg  Statins          Date  Tmax WBC BUN/CR UOP  (mL/kg/hr) Drug/Dose Time   Given Level(s)   (Time) Comments   4/10  (#1) afebrile -- -- -- Vancomycin 2500 mg IV x 1    Vancomycin 1250 mg IV q18hr 0216    <2000>       (#2)             (#3)             (#4)             (#5)             (#6)             (#7)             Estimated Creatinine Clearance: 86 mL/min (based on SCr of 0.8 mg/dL). UOP over the past 24 hours:       Intake/Output Summary (Last 24 hours) at 4/10/2021 0812  Last data filed at 4/10/2021 0635  Gross per 24 hour   Intake --   Output 650 ml   Net -650 ml       Other anti-infectives: Anti-infective Dose Date Initiated Date Stopped   Cefepime 2g IV x 1 4/9 4/9   Pip/tazo 3.375g IV q8hr 4/10      Cultures:  available culture and sensitivity results were reviewed in EPIC  Cultures sent and are pending. Culture Date Result    Blood cx 1 4/9    Blood cx 2 4/10      Assessment:  · Consulted by Dr. Essence Gagnon to dose/monitor vancomycin  · Goal trough level:  15-20 mcg/mL, AUC/MALKA:  400-600  · Pt is a 77 yoF who presented from home with cellulitis of the lower extremities.    · Serum creatinine 4/9: 0.8 mg/dL; CrCl ~ 86 mL/min; baseline Scr ~ 0.8 mg/dL    Plan:  · Vancomycin 1250 mg IV q18hr  · Level prior to 4th dose  · Follow renal function  · Pharmacist will follow and monitor/adjust dosing as necessary      Thank you for the consult,    Lorrie Giron, PharmD, BCPS 4/10/2021 8:12 AM

## 2021-04-10 NOTE — CONSULTS
in 2004  3. LLE DVT in remote past and was placed on coumadin for a period of time  4. HTN  5. HLD: on Pravachol  6. Lipitor myalgias   7. Chronic iron deficiency anemia on iron  8. GERD  9. T2DM insulin neuropathy. HgbA1c 10.2 on 1/15/2021  10. Anxiety/Depression  11. Adenosine Stress test 8/2008: No stress induced ischemia, EF 54%  12. PAF: Not on Coumadin d/t Hx of GIB (2004) and chronic anemia. [id number] score at least 5: (DM, TIA, Female, HTN)--> Eliquis initiated in 2019 per patient without further anemia or endoscopic procedures  13. Questionable TIA (5/2015) with questionable PFO:  14. TTE with bubble study: 6/2/2015:  Mild asymmetric septal hypertrophy, Normal left ventricle size and systolic function, Stage II diastolic dysfunction, Patent foramen ovale with right-to-left shunt with valsalva only was visualized, Trace mitral regurgitation, RVSP is 37 mmHg. 13. Per Dr. Nathalie Pyle: (5/22/2015-office visit note)-\"I personally viewed the echo images myself now.  The images were poor & the atrial septum was not well seen. Gabriela Lamas is not a definite ASD.  A bubble study was performed & was poor quality but the few bubbles that were there did not cross the atrial septum.  The septum is probably aneurysmal & & thus may have a PFO but again the visualization was too poor.  She states that Neuro was still not anum that it was a TIA. \"-Dr. Nathalie Pyle recommended a repeat JIMENA or cardiac MRI-patient reported \"I didn't want the testing\". 16. HFpEF  17. Admitted 01/15/18 with progressive edyspnea troponin normal vRPQ=980 hest xray showed b/l effusion, BNP= 814 and respiratory acidosis on ABG x2. She was given DubNebs x3, Solumedrol and placed on BiPAP. CBC showed an anemic Hemoglobin = 8.2. IV diuresed ~2.5 L. 18. TTE 1/16/2018, Dr. Lawson Oz concentric left ventricular hypertrophy. EF visually estimated at 55-65%. Stage II diastolic dysfunction. The left atrium is severely dilated. Elevated L atrial pressure.  Mild mitral regurgitation. The aortic valve appears mildly sclerotic. Pulmonary hypertension is mild to moderate. No evidence of pericardial effusion. 23. Untreated moderate SAM, previous AHI 23 , sleep study at [facility]   20. COPD with chronic home O2 (2-4L NC): follows with Dr. Bigg Washington. 21. Chronic Lymphedema has home health wrap her legs 3 x a week  22. History of anal fissure s/p surgery at [facility], appendectomy, tonsillectomy, colonoscopy s./p polypectomy, L TKA, RLE fracture. EGD  showing gastritis,   23. History of occipital neuralgia   24. Gait instability: uses a walker.   25. Chronically sleeps in recliner/couch      Medications Prior to admit:  Prior to Admission medications    Medication Sig Start Date End Date Taking? Authorizing Provider   insulin glargine (LANTUS) 100 UNIT/ML injection vial Inject 50 Units into the skin 2 times daily   Yes Historical Provider, MD   insulin lispro (HUMALOG) 100 UNIT/ML injection vial Inject 0-18 Units into the skin 3 times daily (before meals)   Yes Historical Provider, MD   Mineral Oil-Hydrophil Petrolat OINT Apply 30 mLs topically 3 times daily as needed (wounds) 21  Yes You Roberts MD   OXYGEN Inhale 2 L into the lungs continuous 20  Yes Wells Andrei, DO   escitalopram (LEXAPRO) 20 MG tablet Take 1 tablet by mouth daily 20  Yes Wells Andrei, DO   dilTIAZem (CARDIZEM CD) 240 MG extended release capsule Take 1 capsule by mouth daily  Patient taking differently: Take 240 mg by mouth 2 times daily  20  Yes Wells Andrei, DO   miconazole (MICOTIN) 2 % powder Apply topically 2 times daily. Patient taking differently: as needed Apply topically 2 times daily.  20  Yes Wells Andrei, DO   miconazole nitrate 2 % OINT Apply topically 3 times daily as needed (after toileting) 20  Yes Elo Bingham, DO   apixaban (ELIQUIS) 5 MG TABS tablet Take 5 mg by mouth 2 times daily   Yes Historical Provider, MD bumetanide (BUMEX) 2 MG tablet Take 2 mg by mouth 2 times daily   Yes Historical Provider, MD   gabapentin (NEURONTIN) 300 MG capsule Take 300 mg by mouth 3 times daily.    Yes Historical Provider, MD   lansoprazole (PREVACID) 30 MG delayed release capsule Take 30 mg by mouth 2 times daily   Yes Historical Provider, MD   traZODone (DESYREL) 50 MG tablet Take 50 mg by mouth nightly   Yes Historical Provider, MD   vitamin D (ERGOCALCIFEROL) 1.25 MG (94346 UT) CAPS capsule Take 50,000 Units by mouth once a week Given Monday   Yes Historical Provider, MD   ipratropium-albuterol (DUONEB) 0.5-2.5 (3) MG/3ML SOLN nebulizer solution Take 3 mLs by nebulization every 6 hours as needed for Shortness of Breath 9/30/20  Yes Jersey Johnson,    omega-3 acid ethyl esters (LOVAZA) 1 g capsule Take 2 capsules by mouth 2 times daily 9/1/20  Yes Jersey Johnson DO   spironolactone (ALDACTONE) 25 MG tablet Take 1 tablet by mouth daily 8/27/20  Yes [facility]   montelukast (SINGULAIR) 10 MG tablet Take 1 tablet by mouth nightly 8/27/20  Yes [facility],    albuterol sulfate  (90 Base) MCG/ACT inhaler Inhale 2 puffs into the lungs 4 times daily as needed for Wheezing 8/27/20  Yes Jersey Johnson DO   metoprolol succinate (TOPROL XL) 100 MG extended release tablet Take 1 tablet by mouth daily 8/25/20  Yes Jersey Johnson DO   ferrous sulfate (IRON 325) 325 (65 Fe) MG tablet Take 1 tablet by mouth daily (with breakfast) 8/25/20  Yes Jersey Johnson DO   acetaminophen (TYLENOL) 325 MG tablet Take 650 mg by mouth every 6 hours as needed for Pain   Yes Historical Provider, MD       Current Medications:    Current Facility-Administered Medications: apixaban (ELIQUIS) tablet 5 mg, 5 mg, Oral, BID  dilTIAZem (CARDIZEM CD) extended release capsule 240 mg, 240 mg, Oral, Daily  escitalopram (LEXAPRO) tablet 20 mg, 20 mg, Oral, Daily  gabapentin (NEURONTIN) capsule 300 mg, 300 mg, Oral, TID  ipratropium-albuterol (DUONEB) nebulizer solution 3 mL, 1 vial, Nebulization, Q6H PRN  pantoprazole (PROTONIX) tablet 40 mg, 40 mg, Oral, QAM AC  metoprolol succinate (TOPROL XL) extended release tablet 100 mg, 100 mg, Oral, Daily  traZODone (DESYREL) tablet 50 mg, 50 mg, Oral, Nightly  bumetanide (BUMEX) injection 2 mg, 2 mg, Intravenous, Daily  piperacillin-tazobactam (ZOSYN) 3,375 mg in dextrose 5 % 100 mL IVPB extended infusion (mini-bag), 3,375 mg, Intravenous, Q8H  miconazole (MICOTIN) 2 % powder, , Topical, BID PRN  white petrolatum ointment 30 mL, 30 mL, Topical, TID PRN  montelukast (SINGULAIR) tablet 10 mg, 10 mg, Oral, Nightly  omega-3 acid ethyl esters (LOVAZA) capsule 2 g, 2 g, Oral, BID  spironolactone (ALDACTONE) tablet 25 mg, 25 mg, Oral, Daily  [START ON 4/12/2021] vitamin D (ERGOCALCIFEROL) capsule 50,000 Units, 50,000 Units, Oral, Weekly  ferrous sulfate (IRON 325) tablet 325 mg, 325 mg, Oral, Daily with breakfast  ipratropium-albuterol (DUONEB) nebulizer solution 1 ampule, 1 ampule, Inhalation, Q4H WA  glucose (GLUTOSE) 40 % oral gel 15 g, 15 g, Oral, PRN  dextrose 50 % IV solution, 12.5 g, Intravenous, PRN  glucagon (rDNA) injection 1 mg, 1 mg, Intramuscular, PRN  dextrose 5 % solution, 100 mL/hr, Intravenous, PRN  vancomycin (VANCOCIN) 1,250 mg in dextrose 5 % 250 mL IVPB, 1,250 mg, Intravenous, Q18H  insulin glargine (LANTUS) injection vial 50 Units, 50 Units, Subcutaneous, BID  insulin lispro (HUMALOG) injection vial 0-18 Units, 0-18 Units, Subcutaneous, TID WC  insulin lispro (HUMALOG) injection vial 0-9 Units, 0-9 Units, Subcutaneous, Nightly  sodium chloride flush 0.9 % injection 5-40 mL, 5-40 mL, Intravenous, 2 times per day  sodium chloride flush 0.9 % injection 5-40 mL, 5-40 mL, Intravenous, PRN  0.9 % sodium chloride infusion, 25 mL, Intravenous, PRN  promethazine (PHENERGAN) tablet 12.5 mg, 12.5 mg, Oral, Q6H PRN **OR** ondansetron (ZOFRAN) injection 4 mg, 4 mg, Intravenous, Q6H PRN  polyethylene glycol (GLYCOLAX) packet 17 g, 17 g, Oral, Daily PRN  acetaminophen (TYLENOL) tablet 650 mg, 650 mg, Oral, Q6H PRN **OR** acetaminophen (TYLENOL) suppository 650 mg, 650 mg, Rectal, Q6H PRN    Allergies:  Statins    Social History:    Quit smoking in 2004. Prior to that smoked 1ppd for 15 years. Denies alcohol and illicit drug use. Caffeine intake includes Coca Cola daily. Family History: Mother with Hx of \"a really bad heart\" that began in her 62s (specifics unclear). REVIEW OF SYSTEMS:     · Constitutional: Denies fevers, chills, night sweats, and fatigue  · HEENT: Denies headaches, nose bleeds, and blurred vision,oral pain, abscess or lesion. · Musculoskeletal: Denies falls, pain to BLE with ambulation and complains of chronic lymph edema to BLE. · Neurological: Denies dizziness and lightheadedness, numbness and tingling  · Cardiovascular: Denies chest pain, palpitations, and feelings of heart racing. · Respiratory: Denies orthopnea and PND. Complains of MADRID  · Gastrointestinal: Denies heartburn, nausea/vomiting, diarrhea and constipation, black/bloody, and tarry stools. · Genitourinary: Denies dysuria and hematuria  · Hematologic: Denies excessive bruising or bleeding  · Lymphatic: Denies lumps and bumps to neck, axilla, breast, and groin  · Endocrine: Denies excessive thirst. Denies intolerance to hot and cold  · GYN: Postmenopausal state; Denies vaginal bleeding. · Psychiatric: Denies anxiety and depression. PHYSICAL EXAM:   BP (!) 145/65   Pulse 107   Temp 97.9 °F (36.6 °C) (Temporal)   Resp 24   Ht 5' 3\" (1.6 m)   Wt 267 lb (121.1 kg)   LMP  (LMP Unknown)   SpO2 92%   BMI 47.30 kg/m²   CONST:  Well developed, morbidly obese, elderly female who appears stated age.  Awake, alert, cooperative, no apparent distress  HEENT:   Head- Normocephalic, atraumatic   Eyes- Conjunctivae pink, anicteric  Throat- Oral mucosa pink and moist  Neck-  No stridor, trachea midline, no jugular venous distention. Short, thick neck. No adenopathy   CHEST: Chest symmetrical and non-tender to palpation. No accessory muscle use or intercostal retractions  RESPIRATORY: Lung sounds - diminished throughout fields   CARDIOVASCULAR:     No carotid bruit  Heart Inspection- shows no noted pulsations  Heart Palpation- no heaves or thrills; PMI is non-displaced   Heart Ausculation- Regular rate and rhythm, no murmur. No s3, or rub   PV: Lymph edema to bilateral lower extremities. Thick scales to BLE with vascular discoloration noted. Pedal pulses palpable, no clubbing or cyanosis   ABDOMEN: Soft, morbidly obese, non-tender to light palpation. Bowel sounds present. No palpable masses no organomegaly; no abdominal bruit  MS: Good muscle strength and tone. No atrophy or abnormal movements. : Deferred  SKIN: Warm and dry no statis dermatitis or ulcers   NEURO / PSYCH: Oriented to person, place and time. Speech clear and appropriate. Follows all commands.  Pleasant affect     DATA:    ECG: SR   Tele strips: SR with HR in the 90s  Diagnostic:      Intake/Output Summary (Last 24 hours) at 4/10/2021 1035  Last data filed at 4/10/2021 0635  Gross per 24 hour   Intake --   Output 650 ml   Net -650 ml       Labs:   CBC:   Recent Labs     04/09/21 1943   WBC 7.6   HGB 8.6*   HCT 29.7*        BMP:   Recent Labs     04/09/21 1943      K 5.2*   CO2 29   BUN 21   CREATININE 0.8   LABGLOM >60   CALCIUM 8.9   HgA1c:   Lab Results   Component Value Date    LABA1C 10.2 (H) 01/13/2021   CARDIAC ENZYMES:  Recent Labs     04/09/21 1943   TROPONINI <0.01     FASTING LIPID PANEL:  Lab Results   Component Value Date    CHOL 355 01/16/2021    HDL 52 01/16/2021    LDLCALC 243 01/16/2021    TRIG 298 01/16/2021     LIVER PROFILE:  Recent Labs     04/09/21 1943   AST 8   ALT 7   LABALBU 3.1*     04/09/2021 CT of Pelvis:   Nonspecific soft tissue density anterior to the tip of the coccyx with  possible erosive pressure  Lungs: Bibasilar rales  Cardiac: Regular rhythm with a normal rate, normal S1&S2, no murmurs apparent  Abdomen: Soft, positive bowel sounds, nontender  Extremities: Moves all extremities x 4, severe lymphedema skin thickening bilateral lower extremities with skin weeping  Neurologic: No focal motor deficits apparent, flat affect    Telemetry: Sinus rhythm 80s  EKG: Sinus rhythm 100 bpm.  Normal axis. Incomplete bundle branch block, QRS duration 106 ms. Impression:   1. Acute on chronic heart failure with preserved ejection fraction. proBNP 295. Likely related to dietary discretion. Net -3.0 L  2. Paroxysmal atrial fibrillation, maintaining sinus. Anticoagulate with apixaban  3. Chronic lymphedema with cellulitis  4. Acute on chronic hypoxic aspiratory failure, on 4 L oxygen baseline  5. History of PFO  6. Untreated sleep apnea  7. Morbid obesity, BMI 47.3 kg/m²  8. Hyperkalemia potassium 5.2  9. Hypertension  10. Hyperlipidemia  11. Type 2 diabetes, insulin requiring with neuropathy. A1c 10.2%  12. History of TIA  13. Anemia Hgb 8.6  14. COPD     Recommendations:     Increase IV bumetanide to 2 mg twice daily   Change diet to low-sodium, strict I's and O's and daily weights   Check FOBT given drop in hemoglobin on anticoagulation   Continue apixaban for stroke risk reduction, watch hemoglobin   Continue diltiazem, metoprolol succinate, spironolactone watching potassium   Further care per primary, ID, palliative care   Aggressive risk factor modification    Thank you for the consultation. Please do not hesitate to call with questions.     Emanuel Boucher MD, [address] [facility]

## 2021-04-10 NOTE — ED PROVIDER NOTES
HPI   42-year-old female patient presents to emergency department for increasing shortness of breath over the last few days. Patient with history of COPD CHF and chronic lymphedema. Patient on baseline 2 L of oxygen has had to use 4 L due to increasing shortness of breath. Patient's wound nurse also came to check on her legs and said that her left leg is appearing to be red swollen more so than usual and cellulitic. Patient is having increasing moderate level pain sharp sensation and worse with weightbearing and tenderness to palpation. Patient is on Eliquis. Denies any fevers, chills, nausea, vomiting, diarrhea, chest pain. Patient does mention some sacral pain denies any trauma. Review of Systems   Constitutional: Positive for fatigue. Negative for chills and fever. HENT: Negative for congestion. Respiratory: Positive for shortness of breath. Cardiovascular: Negative for chest pain. Gastrointestinal: Negative for abdominal pain, nausea and vomiting. Genitourinary: Negative for dysuria. Musculoskeletal:        Sacral pain   Skin: Positive for wound (Left leg). Physical Exam  Vitals signs and nursing note reviewed. Constitutional:       Appearance: She is obese. Comments: Wearing nasal cannula oxygen 4 L satting 97%. HENT:      Mouth/Throat:      Pharynx: Oropharynx is clear. Comments: Poor dentition,  Eyes:      General: No scleral icterus. Conjunctiva/sclera: Conjunctivae normal.   Cardiovascular:      Rate and Rhythm: Normal rate and regular rhythm. Heart sounds: Murmur present. Pulmonary:      Comments: Decreased breath sounds throughout. Abdominal:      General: Bowel sounds are normal. There is no distension. Palpations: Abdomen is soft. Tenderness: There is no abdominal tenderness. Musculoskeletal:      Right lower leg: Edema present. Left lower leg: Edema present. Skin:     General: Skin is warm.       Comments: Significant bilateral lower leg edema and redness, left leg over tibia especially red tender to palpation and weeping   Neurological:      General: No focal deficit present. Mental Status: She is alert. Procedures     MDM   68-year-old female patient here in the emergency department for increasing shortness of breath and worsening leg swelling and redness. It appears patient's left leg is cellulitic there is weeping area of the leg. Patient's chest x-ray notes pulmonary vascular congestion. She was also complaining of coccygeal pain. CT scan of the pelvis demonstrates erosive changes to the distal coccygeal segment. Patient covered with vancomycin and cefepime for the cellulitis. She will be admitted for the cellulitis and will need further evaluation of her coccyx. EKG: This EKG is signed and interpreted by me. Rate:100  Rhythm: Sinus  Interpretation: no acute changes  Comparison: stable as compared to patient's most recent EKG             --------------------------------------------- PAST HISTORY ---------------------------------------------  Past Medical History:  has a past medical history of (HFpEF) heart failure with preserved ejection fraction ([address] Anal fissure, Anemia, Anxiety and depression, Arthritis, Asthma, Atrial fibrillation ([address] Blood transfusion, CHF (congestive heart failure) ([address] COPD (chronic obstructive pulmonary disease) ([address] Disc disorder, DM2 (diabetes mellitus, type 2) ([address] Fall due to stumbling, GERD (gastroesophageal reflux disease), History of blood transfusion, Hx of blood clots, Hyperlipidemia, Hypertension, Inappropriate sinus tachycardia, LVH (left ventricular hypertrophy), Lymphadenitis, chronic, Occipital neuralgia, Respiratory acidosis, and Sinus tachycardia. Past Surgical History:  has a past surgical history that includes Tonsillectomy (); Appendectomy ();  section ();  section ();  section ();  Anus surgery (4/4/2006); Upper gastrointestinal endoscopy (1/20/2004); Colonoscopy (1/20/2004); Colonoscopy (8/11/2006); anoscopy (10/25/2006); anoscopy (4/9/2007); anoscopy (6/13/2007); Colonoscopy (3/23/2012); Anus surgery (4/18/2012); Upper gastrointestinal endoscopy (N/A, 6/1/2018); Upper gastrointestinal endoscopy (N/A, 11/9/2018); pr debridement, skin, sub-q tissue,muscle,=<20 sq cm (Left, 11/30/2018); Endoscopy, colon, diagnostic; fracture surgery; and joint replacement (2003). Social History:  reports that she quit smoking about 16 years ago. Her smoking use included cigarettes. She started smoking about 41 years ago. She has a 37.50 pack-year smoking history. She has never used smokeless tobacco. She reports previous alcohol use. She reports previous drug use. Family History: family history includes Colon Cancer in her father and sister; Diabetes in her father, paternal aunt, paternal aunt, paternal uncle, paternal uncle, and sister; Heart Disease in her mother; Other in her father and sister. The patients home medications have been reviewed.     Allergies: Statins    -------------------------------------------------- RESULTS -------------------------------------------------    LABS:  Results for orders placed or performed during the hospital encounter of 04/09/21   CBC Auto Differential   Result Value Ref Range    WBC 7.6 4.5 - 11.5 E9/L    RBC 3.62 3.50 - 5.50 E12/L    Hemoglobin 8.6 (L) 11.5 - 15.5 g/dL    Hematocrit 29.7 (L) 34.0 - 48.0 %    MCV 82.0 80.0 - 99.9 fL    MCH 23.8 (L) 26.0 - 35.0 pg    MCHC 29.0 (L) 32.0 - 34.5 %    RDW 15.5 (H) 11.5 - 15.0 fL    Platelets 823 700 - 714 E9/L    MPV 9.1 7.0 - 12.0 fL    Neutrophils % 76.4 43.0 - 80.0 %    Immature Granulocytes % 0.4 0.0 - 5.0 %    Lymphocytes % 13.9 (L) 20.0 - 42.0 %    Monocytes % 7.3 2.0 - 12.0 %    Eosinophils % 1.7 0.0 - 6.0 %    Basophils % 0.3 0.0 - 2.0 %    Neutrophils Absolute 5.79 1.80 - 7.30 E9/L    Immature Granulocytes # 0.03 E9/L

## 2021-04-10 NOTE — HOME CARE
Patient is active with [facility] for SN and PT. Will need home health resumption of care orders at discharge.  Thank you, [facility]

## 2021-04-10 NOTE — CONSULTS
[address] [place] [address] [facility]    Consultation Note     Admit Date: 4/9/2021  7:07 PM    Reason for Consult:   Left leg red swollen and cellulitic    Attending Physician:  Kaiden Silvestre DO     Chief Complaint: Shortness of breath moving from pain 2 L to 4 L    HISTORY OF PRESENT ILLNESS:   The patient is a 77 y.o.  woman known to the Infectious Diseases service. The patient is admitted through the emergency room with shortness of breath and breakdown left lower leg with cellulitis. She has chronic lymphedema and has had multiple admissions as seen below for similar problems. The admission was stimulated by the wound care nurse who saw her in evaluation at home and suggested admission. Pain on weightbearing of the left lower extremity has been documented and the patient denies however any fevers chills nausea vomiting diarrhea chest pain  sacral pain is noted as well. Emergency room labs showed a white count of 7.6 and hemoglobin was 8.6 with a proBNP of 295 and a BUN and creatinine 21 and 0.8. There has been no documented fevers none since admission she is O2 satting at 94% on 4 L. The sacral pain precipitated a CT of the abdomen pelvis and of note is a tip of the coccyx shows erosive changes. She was given a dose of cefepime in the emergency room continued on Zosyn and vancomycin. For the most part the patient is immobile with some mild contracture flexors on the right side although she says she would does walk with a walker            5/29/2020 seen by Dr. Shawnee napoles for gluteal abscess and lower extremity stasis dermatitis and cellulitis.   Therapy started on vancomycin and Zosyn treated done as an outpatient for continuation of 7 days    12/25/2019 seen by Dr. Daina Norris and Amaris Alexandre right lower extremity cellulitis and stress fractures left's lower extremity lymphedema and chronic onychomycosis      9/23/2019 admitted to [facility] for treatment of intertriginous candidiasis onychomycosis lymphedema with stasis dermatitis and was on IV cefazolin doxy and topical miconazole and clotrimazole in the webspaces of the foot    11/27/18. Admitted to [facility] with worsening left lower extremity lymphedema. Seen by Dr. Joshua Chand and treated for cellulitis with Vancomycin, Cefepime. Also given Terbinafine for onychomycosis. Wound cultures showed ESBL + E. coli, Streptococcus agalactiae and Pseudomonas. Antibiotics were consolidated to Meropenem and the patient was discharged to nursing facility. Microbiology  11/30/2018 left leg tissue grew Pseudomonas, E. coli group B strep and Candida albicans        Past Medical History:        Diagnosis Date    (HFpEF) heart failure with preserved ejection fraction ([facility] [address]     1/16/2018- echo- LVEF 55-65%    Anal fissure     Anemia 10/6/2017    Anxiety and depression     Arthritis     Asthma     Atrial fibrillation (HCC)     Eliquis    Blood transfusion     long time no reaction    CHF [facility]congestive heart failure) [facility]     Diastolic    COPD (chronic obstructive pulmonary disease) ([facility]     Disc disorder     DM2 (diabetes mellitus, type 2) ([facility]     on insulin    Fall due to stumbling 10/6/2017    GERD (gastroesophageal reflux disease)     History of blood transfusion     Hx of blood clots     Hyperlipidemia     Hypertension     Inappropriate sinus tachycardia     LVH (left ventricular hypertrophy)     moderate    Lymphadenitis, chronic 4/13/2018    Occipital neuralgia 11/2/2011    Respiratory acidosis 10/7/2017    Sinus tachycardia 2/16/2015     Past Surgical History:        Procedure Laterality Date    ANOSCOPY  10/25/2006    injection of anal sphincter with Botox, Franklyn Ortiz/Timmy, [facility]    ANOSCOPY  4/9/2007    injection of anal sphincter with Botox, Dr. Glory Cabrera, [address] [place] [address] ANOSCOPY  6/13/2007    injection of anal sphincter with Botox, Franklyn Epps, [facility]    ANUS SURGERY 4/4/2006    Lateral sphincterotomy for chronic anal fissure, Dr. Damien Trinidad, [facility]  4/18/2012    anal exam and injection of Botox 100 units into anal sphincter, Laila Driscoll, [facility]    APPENDECTOMY  1965   [address]    COLONOSCOPY  1/20/2004    cecal polyp, bx (no pathologic changes), Dr. Soha Boogie, [facility] COLONOSCOPY  8/11/2006    snare polypectomy terminal ileum polyp (granulation tissue polyp) and anal polyp (inflammatory/papilla), Dr. Lakhwinder Forbes, [facility] COLONOSCOPY  3/23/2012    bx/cauterization proximal ascending colon polyp, injection of anal sphincter with Botox, Dr. Lakhwinder Forbes, [facility]    ENDOSCOPY, COLON, DIAGNOSTIC      FRACTURE SURGERY      R leg fracture with surgery for repair    JOINT REPLACEMENT  2003    L knee    ND DEBRIDEMENT, SKIN, SUB-Q TISSUE,MUSCLE,=<20 SQ CM Left 11/30/2018    LEFT LEG EXCISIONAL  DEBRIDEMENT WITH TISSUE BIOPSY performed by Anoop Dinero DPM at [address] [facility] ENDOSCOPY  1/20/2004    gastritis, bx (no H pylori), Dr. Soha Boogie, [address] ENDOSCOPY N/A 6/1/2018    EGD BIOPSY performed by Liv Broderick MD at [facility] N/A 11/9/2018    EGD BIOPSY performed by Liv Broderick MD at [facility] ENDOSCOPY     Current Medications:   Scheduled Meds:   apixaban  5 mg Oral BID    dilTIAZem  240 mg Oral Daily    escitalopram  20 mg Oral Daily    gabapentin  300 mg Oral TID    pantoprazole  40 mg Oral QAM AC    metoprolol succinate  100 mg Oral Daily    traZODone  50 mg Oral Nightly    piperacillin-tazobactam  3,375 mg Intravenous Q8H    montelukast  10 mg Oral Nightly    omega-3 acid ethyl esters  2 g Oral BID    spironolactone  25 mg Oral Daily    [START ON 4/12/2021] vitamin D  50,000 Units Oral Weekly    ferrous sulfate  325 mg Oral Daily with breakfast    ipratropium-albuterol 1 ampule Inhalation Q4H WA    vancomycin  1,250 mg Intravenous Q18H    insulin glargine  50 Units Subcutaneous BID    insulin lispro  0-18 Units Subcutaneous TID WC    insulin lispro  0-9 Units Subcutaneous Nightly    bumetanide  2 mg Intravenous BID    sodium chloride flush  5-40 mL Intravenous 2 times per day     Continuous Infusions:   dextrose      sodium chloride       PRN Meds:ipratropium-albuterol, miconazole, white petrolatum, glucose, dextrose, glucagon (rDNA), dextrose, traMADol, sodium chloride flush, sodium chloride, promethazine **OR** ondansetron, polyethylene glycol, acetaminophen **OR** acetaminophen    Allergies:  Statins    Social History:   Social History     Socioeconomic History    Marital status:      Spouse name: None    Number of children: 3    Years of education: 9    Highest education level: 9th grade   Occupational History    Occupation: SSD   Social Needs    Financial resource strain: Not hard at all   [employer] insecurity     Worry: Never true     Inability: Never true    Transportation needs     Medical: Yes     Non-medical: No   Tobacco Use    Smoking status: Former Smoker     Packs/day: 1.50     Years: 25.00     Pack years: 37.50     Types: Cigarettes     Start date: 1979     Quit date: 2004     Years since quittin.3    Smokeless tobacco: Never Used    Tobacco comment: Stopped smoking 16 years ago   Substance and Sexual Activity    Alcohol use: Not Currently     Frequency: Never     Comment: 1 can coke daily    Drug use: Not Currently    Sexual activity: Not Currently     Partners: Male   Lifestyle    Physical activity     Days per week: 0 days     Minutes per session: 0 min    Stress: Not at all   Relationships    Social connections     Talks on phone: More than three times a week     Gets together: More than three times a week     Attends Sikhism service: Never     Active member of club or organization: No     Attends meetings of clubs or organizations: Never     Relationship status:     Intimate partner violence     Fear of current or ex partner: None     Emotionally abused: None     Physically abused: None     Forced sexual activity: None   Other Topics Concern    None   Social History Narrative    Denies caffeine. Grandson shops for her and helps around the house    Patient unable to exercise d/t mobility status     Patient lives with her  who has limited speech ability d/t hx of CVA. He is able to assist patient with IADLS   Family History:       Problem Relation Age of Onset    Heart Disease Mother     Diabetes Father     Colon Cancer Father     Other Father         BLINDNESS    Colon Cancer Sister     Other Sister         shingles- has had over 1 year    Diabetes Paternal Aunt     Diabetes Paternal Uncle     Diabetes Paternal Aunt     Diabetes Paternal Uncle     Diabetes Sister    . Otherwise non-pertinent to the chief complaint. REVIEW OF SYSTEMS:    CONSTITUTIONAL:  No chills, fevers or night sweats. No loss of weight. EYES:  No double vision or drainage from eyes, ears or throat. HEENT:  No neck stiffness. No dysphagia. No drainage from eyes, ears or throat  RESPIRATORY:  No cough, productive sputum or hemoptysis. CARDIOVASCULAR:  No chest pain, palpitations, orthopnea or dyspnea on exertion. GASTROINTESTINAL:  No nausea, vomiting, diarrhea or constipation or hematochezia   GENITOURINARY:  No frequency burning dysuria or hematuria. INTEGUMENT/BREAST:  No rash or breast masses. HEMATOLOGIC/LYMPHATIC:  No lymphadenopathy or blood dyscrasics. ALLERGIC/IMMUNOLOGIC:  No anaphylaxis. ENDOCRINE:  No polyuria or polydipsia or temperature intolerance. MUSCULOSKELETAL:  No myalgia or arthralgia. Full ROM. Sacral coccyx pain. Bilateral lymphedema with stasis dermatitis and. piel d' Orange  NEUROLOGICAL:  No focal motor sensory deficit. BEHAVIOR/PSYCH:  No psychosis.      PHYSICAL EXAM:    Vitals: BP (!) 145/65   Pulse 107   Temp 97.9 °F (36.6 °C) (Temporal)   Resp 24   Ht 5' 3\" (1.6 m)   Wt 267 lb (121.1 kg)   LMP  (LMP Unknown)   SpO2 94%   BMI 47.30 kg/m²   Constitutional: The patient is awake, alert, and oriented. Skin: Warm and dry. No rashes were noted. No jaundice. HEENT: Eyes show round, and reactive pupils. Moist mucous membranes, no ulcerations, no thrush. Neck: Supple to movements. No lymphadenopathy. Chest: No use of accessory muscles to breathe. Symmetrical expansion. Auscultation reveals no wheezing, crackles, or rhonchi. Cardiovascular: S1 and S2 are rhythmic and regular. No murmurs appreciated. Abdomen: Positive bowel sounds to auscultation. Benign to palpation. No masses felt. No hepatosplenomegaly. Genitourinary: Female  Extremities: No clubbing, no cyanosis, lymphedema bilaterally with more edema on the left lower extremity with erythema and cellulitic component. .  Musculoskeletal: Equal and symmetrical, pain over the sacral coccyx region  Neurological: Inability to extend the right lower extremity otherwise moves upper and lower extremities with 2 out of 5 strength in the right leg 3 out of 5 on the left  Lines: peripheral      CBC+dif:  Recent Labs     04/09/21 1943   WBC 7.6   HGB 8.6*   HCT 29.7*   MCV 82.0      NEUTROABS 5.79     Lab Results   Component Value Date    CRP 14.0 (H) 05/29/2020    CRP 2.2 (H) 12/24/2019    CRP 2.0 (H) 09/22/2019     No results found for: CRP  Lab Results   Component Value Date    SEDRATE 75 (H) 05/29/2020    SEDRATE 41 (H) 12/24/2019    SEDRATE 38 (H) 09/22/2019     Lab Results   Component Value Date    ALT 7 04/09/2021    AST 8 04/09/2021    ALKPHOS 90 04/09/2021    BILITOT <0.2 04/09/2021     Lab Results   Component Value Date     04/09/2021    K 5.2 04/09/2021     04/09/2021    CO2 29 04/09/2021    BUN 21 04/09/2021    CREATININE 0.8 04/09/2021    GFRAA >60 04/09/2021    LABGLOM >60 04/09/2021 GLUCOSE 391 04/09/2021    GLUCOSE 181 12/16/2011    PROT 6.1 04/09/2021    LABALBU 3.1 04/09/2021    LABALBU 4.5 11/02/2011    CALCIUM 8.9 04/09/2021    BILITOT <0.2 04/09/2021    ALKPHOS 90 04/09/2021    AST 8 04/09/2021    ALT 7 04/09/2021       Lab Results   Component Value Date    PROTIME 12.4 01/12/2021    INR 1.1 01/12/2021       Lab Results   Component Value Date    TSH 0.575 01/15/2021       Lab Results   Component Value Date    NITRITE negative 07/26/2018    COLORU Yellow 11/17/2020    PHUR 7.0 11/17/2020    WBCUA NONE 11/17/2020    RBCUA 0-1 11/17/2020    YEAST Present 05/28/2020    BACTERIA NONE SEEN 11/17/2020    CLARITYU Clear 11/17/2020    SPECGRAV 1.010 11/17/2020    LEUKOCYTESUR Negative 11/17/2020    UROBILINOGEN 0.2 11/17/2020    BILIRUBINUR Negative 11/17/2020    BILIRUBINUR negative 07/26/2018    BLOODU TRACE-INTACT 11/17/2020    GLUCOSEU >=1000 11/17/2020    AMORPHOUS FEW 08/14/2020       No results found for: UXK5XHD, BEART, M0OCGTQB, PHART, THGBART, IXT5WXU, PO2ART, XQR0OOY  Radiology:  CT PELVIS WO CONTRAST Additional Contrast? None   Final Result   Nonspecific soft tissue density anterior to the tip of the coccyx with   possible erosive changes involving the anterior cortex of the distal   coccygeal segment. This is best demonstrated on axial image 85. Nuclear   medicine bone scan may be helpful to further characterize if clinically   warranted. XR CHEST PORTABLE   Final Result   Cardiomegaly with pulmonary vascular congestion and developing interstitial   pulmonary edema or pneumonia throughout right lung. Microbiology:  Pending  No results for input(s): BC in the last 72 hours. No results for input(s): ORG in the last 72 hours. No results for input(s): Destin Pastel in the last 72 hours. No results for input(s): STREPNEUMAGU in the last 72 hours. No results for input(s): LP1UAG in the last 72 hours. No results for input(s): ASO in the last 72 hours.   No results for input(s): CULTRESP in the last 72 hours. Assessment:  · Recurrent cellulitis in the patient with chronic lymphedema changes of the skin on the left  · Onychomycosis tinea pedis  · Pain over the coccyx region with CAT scan findings of some abnormalities there      Plan:    · Cont Zosyn plus Zyvox  · Bone scan  · Lamisil  · Check cultures  · Baseline ESR, CRP  · Monitor labs  · Will follow with you    Thank you for having us see this patient in consultation. I will be discussing this case with the treating physicians.       Electronically signed by Marnie Rivera MD on 4/10/2021 at 2:03 PM

## 2021-04-10 NOTE — PROGRESS NOTES
Hospitalist Progress Note      SYNOPSIS: Patient admitted on 2021 for SOB for a few days, leg swelling    Hospital Course: 24-year-old female patient presents to emergency department for increasing shortness of breath over the last few days. Patient with history of COPD, CHF, and chronic lymphedema. Patient on baseline 2 L of oxygen, has had to use 4 L due to increasing shortness of breath. Patient's wound nurse also came to check on her legs and said that her left leg is appearing to be more swollen and red. It appears patient's left leg is cellulitic, there is weeping area of the leg. Patient's chest x-ray notes pulmonary vascular congestion. She was also complaining of sacral/coccygeal pain, denies trauma. CT scan of the pelvis demonstrates erosive changes to the distal coccygeal segment. Denies any fevers, chills, nausea, vomiting, diarrhea, chest pain. SUBJECTIVE:    Patient seen and examined  Records reviewed. Patient reporting left lateral leg pain, states previous admissions tramadol helped with the pain. Reports left leg is not normally this red and painful. Denies coccyx pain at this time, rolled patient with help of 2 nurses, no open area on coccyx noted. Stable overnight. No other overnight issues reported. Temp (24hrs), Av.7 °F (36.5 °C), Min:97.1 °F (36.2 °C), Max:98.9 °F (37.2 °C)    DIET: DIET CARB CONTROL;  CODE: Full Code    Intake/Output Summary (Last 24 hours) at 4/10/2021 7156  Last data filed at 4/10/2021 [id number]  Gross per 24 hour   Intake --   Output 650 ml   Net -650 ml       Review of Systems  All bolded are positive; please see HPI  General:  Fever, chills, diaphoresis, fatigue, malaise, night sweats, weight loss  Psychological:  Anxiety, disorientation, hallucinations. ENT:  Epistaxis, headaches, vertigo, visual changes. Cardiovascular:  Chest pain, irregular heartbeats, palpitations, paroxysmal nocturnal dyspnea.   Respiratory:  Shortness of breath, coughing, sputum production, hemoptysis, wheezing, orthopnea. Gastrointestinal:  Nausea, vomiting, diarrhea, heartburn, constipation, abdominal pain, hematemesis, hematochezia, melena, acholic stools  Genito-Urinary:  Dysuria, urgency, frequency, hematuria  Musculoskeletal:  Joint pain, joint stiffness, joint swelling, muscle pain, left leg pain  Neurology:  Headache, focal neurological deficits, weakness, numbness, paresthesia  Derm:  Rashes, ulcers, excoriations, bruising  Extremities:  Decreased ROM, peripheral edema, mottling      OBJECTIVE:    BP (!) 145/64   Pulse 106   Temp 98.9 °F (37.2 °C) (Temporal)   Resp 20   Ht 5' 3\" (1.6 m)   Wt 267 lb (121.1 kg)   LMP  (LMP Unknown)   SpO2 94%   BMI 47.30 kg/m²     General appearance:  awake, alert, and oriented x3; appears documented age; NAD, no labored breathing, obese  HEENT:  Conjunctivae/corneas clear. Mucous membranes moist.  EOMI. No scleral icterus. Neck: No JVD. Trachea midline. Respiratory: symmetrical; clear to auscultation bilaterally; no wheezes, rhonchi or rales  Cardiovascular: rhythm regular; rate controlled; no murmurs, rubs or gallop  Abdomen: Soft, nontender, nondistended  Extremities:  peripheral pulses present; no peripheral edema; no ulcers  Musculoskeletal: No clubbing, cyanosis. limited ability to move lower extremities secondary to swelling.   Skin:  Lower extremities thickened red to brown skin, erythematous left lateral lower leg, weeping from bilateral extremities  Neurologic: awake, alert and following commands   Psychiatric: non-anxious affect, cooperative    ASSESSMENT    Acute on chronic HFpEF  Acute hypoxic respiratory failure  Cellulitis  AFib on Eliquis  SAM  Obesity hypoventilation syndrome  COPD  Uncontrolled Diabetes Mellitus type 2 with Hyperglycemia  Hypertension  Hyperlipidemia  Lymphedema    PLAN:    - Continue home medications for chronic stable medical conditions  - IV diuretics, echocardiogram.    - Will consult cardiology for evaluation  - broad-spectrum antibiotic with a concern for lower extremity cellulitis, given comorbidities - Will consult infectious disease for evaluation  - Consult wound care  - Continue home medications for chronic stable medical condition  - blood glucose coverage    DISPOSITION:   DVT prophylaxis: Eliquis  CODE STATUS: Patient is a full code  Discharge plan: Patient can be discharged next 3 to 4 days to home with and without home health.,  Pending clinical improvement, pending work-up and clearance by consulting services. Medications:  REVIEWED DAILY    Infusion Medications    sodium chloride       Scheduled Medications    apixaban  5 mg Oral BID    dilTIAZem  240 mg Oral Daily    escitalopram  20 mg Oral Daily    gabapentin  300 mg Oral TID    pantoprazole  40 mg Oral QAM AC    metoprolol succinate  100 mg Oral Daily    traZODone  50 mg Oral Nightly    bumetanide  2 mg Intravenous Daily    piperacillin-tazobactam  3,375 mg Intravenous Q8H    sodium chloride flush  5-40 mL Intravenous 2 times per day     PRN Meds: ipratropium-albuterol, sodium chloride flush, sodium chloride, promethazine **OR** ondansetron, polyethylene glycol, acetaminophen **OR** acetaminophen    Labs:     Recent Labs     04/09/21 1943   WBC 7.6   HGB 8.6*   HCT 29.7*          Recent Labs     04/09/21 1943      K 5.2*      CO2 29   BUN 21   CREATININE 0.8   CALCIUM 8.9       Recent Labs     04/09/21 1943   PROT 6.1*   ALKPHOS 90   ALT 7   AST 8   BILITOT <0.2       No results for input(s): INR in the last 72 hours.     Recent Labs     04/09/21 1943   TROPONINI <0.01       Chronic labs:    Lab Results   Component Value Date    CHOL 355 (H) 01/16/2021    TRIG 298 (H) 01/16/2021    HDL 52 01/16/2021    LDLCALC 243 (H) 01/16/2021    TSH 0.575 01/15/2021    INR 1.1 01/12/2021    LABA1C 10.2 (H) 01/13/2021       Radiology: REVIEWED DAILY    +++++++++++++++++++++++++++++++++++++++++++++++++  [address], [place]  +++++++++++++++++++++++++++++++++++++++++++++++++

## 2021-04-10 NOTE — H&P
INTERNAL MEDICINE HISTORY AND PHYSICAL EXAM     CHIEF COMPLAINT:   Chief Complaint   Patient presents with    Shortness of Breath     SOB onset a few days and leg swelling. hx COPD and lymphedema. 97% on 4L (all the time). HISTORY OF PRESENTING ILLNESS:   53-year-old female patient presents to emergency department for increasing shortness of breath over the last few days. Patient with history of COPD CHF and chronic lymphedema. Patient on baseline 2 L of oxygen has had to use 4 L due to increasing shortness of breath. Patient's wound nurse also came to check on her legs and said that her left leg is appearing to be red swollen and red. It appears patient's left leg is cellulitic there is weeping area of the leg. Patient's chest x-ray notes pulmonary vascular congestion. She was also complaining of coccygeal pain. CT scan of the pelvis demonstrates erosive changes to the distal coccygeal segment. Denies any fevers, chills, nausea, vomiting, diarrhea, chest pain.   Patient does mention some sacral pain denies any trauma    PAST MEDICAL HISTORY  Past Medical History:   Diagnosis Date    (HFpEF) heart failure with preserved ejection fraction ([facility] [address]     1/16/2018- echo- LVEF 55-65%    Anal fissure     Anemia 10/6/2017    Anxiety and depression     Arthritis     Asthma     Atrial fibrillation (HCC)     Eliquis    Blood transfusion     long time no reaction    CHF (congestive heart failure) (HCC)     Diastolic    COPD (chronic obstructive pulmonary disease) (HCC)     Disc disorder     DM2 (diabetes mellitus, type 2) ([facility] [address]     on insulin    Fall due to stumbling 10/6/2017    GERD (gastroesophageal reflux disease)     History of blood transfusion     Hx of blood clots     Hyperlipidemia     Hypertension     Inappropriate sinus tachycardia     LVH (left ventricular hypertrophy)     moderate    Lymphadenitis, chronic 4/13/2018    Occipital neuralgia BLINDNESS    Colon Cancer Sister     Other Sister         shingles- has had over 1 year    Diabetes Paternal Aunt     Diabetes Paternal Uncle     Diabetes Paternal Aunt     Diabetes Paternal Uncle     Diabetes Sister           SOCIAL HISTORY   reports that she quit smoking about 16 years ago. Her smoking use included cigarettes. She started smoking about 41 years ago. She has a 37.50 pack-year smoking history. She has never used smokeless tobacco. She reports previous alcohol use. She reports previous drug use. MEDICATIONS   Medications Prior to Admission: Mineral Oil-Hydrophil Petrolat OINT, Apply 30 mLs topically 3 times daily as needed (wounds)  insulin glargine (LANTUS) 100 UNIT/ML injection vial, Inject 100 Units into the skin 2 times daily  insulin lispro (HUMALOG) 100 UNIT/ML injection vial, Inject 30 Units into the skin 3 times daily (with meals) (Patient taking differently: Inject 40 Units into the skin 3 times daily (with meals) )  OXYGEN, Inhale 2 L into the lungs continuous  escitalopram (LEXAPRO) 20 MG tablet, Take 1 tablet by mouth daily  dilTIAZem (CARDIZEM CD) 240 MG extended release capsule, Take 1 capsule by mouth daily (Patient taking differently: Take 240 mg by mouth 2 times daily )  miconazole (MICOTIN) 2 % powder, Apply topically 2 times daily. (Patient taking differently: as needed Apply topically 2 times daily.)  miconazole nitrate 2 % OINT, Apply topically 3 times daily as needed (after toileting)  apixaban (ELIQUIS) 5 MG TABS tablet, Take 5 mg by mouth 2 times daily  bumetanide (BUMEX) 2 MG tablet, Take 2 mg by mouth 2 times daily  gabapentin (NEURONTIN) 300 MG capsule, Take 300 mg by mouth 3 times daily.   lansoprazole (PREVACID) 30 MG delayed release capsule, Take 30 mg by mouth 2 times daily  traZODone (DESYREL) 50 MG tablet, Take 50 mg by mouth nightly  vitamin D (ERGOCALCIFEROL) 1.25 MG (39874 UT) CAPS capsule, Take 50,000 Units by mouth once a week Given Monday  ipratropium-albuterol (DUONEB) 0.5-2.5 (3) MG/3ML SOLN nebulizer solution, Take 3 mLs by nebulization every 6 hours as needed for Shortness of Breath  omega-3 acid ethyl esters (LOVAZA) 1 g capsule, Take 2 capsules by mouth 2 times daily  spironolactone (ALDACTONE) 25 MG tablet, Take 1 tablet by mouth daily  montelukast (SINGULAIR) 10 MG tablet, Take 1 tablet by mouth nightly  albuterol sulfate  (90 Base) MCG/ACT inhaler, Inhale 2 puffs into the lungs 4 times daily as needed for Wheezing  metoprolol succinate (TOPROL XL) 100 MG extended release tablet, Take 1 tablet by mouth daily  ferrous sulfate (IRON 325) 325 (65 Fe) MG tablet, Take 1 tablet by mouth daily (with breakfast)  acetaminophen (TYLENOL) 325 MG tablet, Take 650 mg by mouth every 6 hours as needed for Pain  Current Facility-Administered Medications   Medication Dose Route Frequency Provider Last Rate Last Admin    apixaban (ELIQUIS) tablet 5 mg  5 mg Oral BID Leonel Lackey MD        dilTIAZem (CARDIZEM CD) extended release capsule 240 mg  240 mg Oral Daily Leonel Lackey MD        escitalopram (LEXAPRO) tablet 20 mg  20 mg Oral Daily Leonel Lackey MD        gabapentin (NEURONTIN) capsule 300 mg  300 mg Oral TID Leonel Lackey MD        ipratropium-albuterol (DUONEB) nebulizer solution 3 mL  1 vial Nebulization Q6H PRN Leonel Lackey MD   3 mL at 04/10/21 0311    pantoprazole (PROTONIX) tablet 40 mg  40 mg Oral QAM AC Leonel Lackey MD        metoprolol succinate (TOPROL XL) extended release tablet 100 mg  100 mg Oral Daily Leonel Lackey MD        traZODone (DESYREL) tablet 50 mg  50 mg Oral Nightly Leonel Lackey MD        vancomycin (VANCOCIN) 2,500 mg in dextrose 5 % 500 mL IVPB  20 mg/kg Intravenous Once Josey Hannah, .7 mL/hr at 04/10/21 0216 2,500 mg at 04/10/21 0216    sodium chloride flush 0.9 % injection 5-40 mL  5-40 mL Intravenous 2 times per day Leonel Lackey MD   5 mL at 04/09/21 2210    sodium chloride flush 0.9 % injection 5-40 mL  5-40 mL Intravenous PRN Naina Roe MD        0.9 % sodium chloride infusion  25 mL Intravenous PRN Naina Roe MD        promethazine (PHENERGAN) tablet 12.5 mg  12.5 mg Oral Q6H PRN Naina Roe MD        Or    ondansetron (ZOFRAN) injection 4 mg  4 mg Intravenous Q6H PRN Naina Roe MD        polyethylene glycol (GLYCOLAX) packet 17 g  17 g Oral Daily PRN Naina Roe MD        acetaminophen (TYLENOL) tablet 650 mg  650 mg Oral Q6H PRN Naina Roe MD   650 mg at 04/10/21 0119    Or    acetaminophen (TYLENOL) suppository 650 mg  650 mg Rectal Q6H PRN Naina Roe MD         Prior to Admission medications    Medication Sig Start Date End Date Taking? Authorizing Provider   Mineral Oil-Hydrophil Petrolat OINT Apply 30 mLs topically 3 times daily as needed (wounds) 1/19/21  Yes Merlin Garcia MD   insulin glargine (LANTUS) 100 UNIT/ML injection vial Inject 100 Units into the skin 2 times daily 1/19/21  Yes Merlin Garcia MD   insulin lispro (HUMALOG) 100 UNIT/ML injection vial Inject 30 Units into the skin 3 times daily (with meals)  Patient taking differently: Inject 40 Units into the skin 3 times daily (with meals)  1/19/21  Yes Merlin Garcia MD   OXYGEN Inhale 2 L into the lungs continuous 11/26/20  Yes Lauro Palma DO   escitalopram (LEXAPRO) 20 MG tablet Take 1 tablet by mouth daily 11/27/20  Yes Lauro Palma DO   dilTIAZem (CARDIZEM CD) 240 MG extended release capsule Take 1 capsule by mouth daily  Patient taking differently: Take 240 mg by mouth 2 times daily  11/27/20  Yes Lauro Palma DO   miconazole (MICOTIN) 2 % powder Apply topically 2 times daily. Patient taking differently: as needed Apply topically 2 times daily.  11/26/20  Yes Lauro Palma DO   miconazole nitrate 2 % OINT Apply topically 3 times daily as needed (after toileting) 11/26/20  Yes Elo Bingham, DO   apixaban (ELIQUIS) 5 MG TABS tablet Take 5 mg by mouth 2 times daily   Yes Historical Provider, MD   bumetanide (BUMEX) 2 MG tablet Take 2 mg by mouth 2 times daily   Yes Historical Provider, MD   gabapentin (NEURONTIN) 300 MG capsule Take 300 mg by mouth 3 times daily. Yes Historical Provider, MD   lansoprazole (PREVACID) 30 MG delayed release capsule Take 30 mg by mouth 2 times daily   Yes Historical Provider, MD   traZODone (DESYREL) 50 MG tablet Take 50 mg by mouth nightly   Yes Historical Provider, MD   vitamin D (ERGOCALCIFEROL) 1.25 MG (32292 UT) CAPS capsule Take 50,000 Units by mouth once a week Given Monday   Yes Historical Provider, MD   ipratropium-albuterol (DUONEB) 0.5-2.5 (3) MG/3ML SOLN nebulizer solution Take 3 mLs by nebulization every 6 hours as needed for Shortness of Breath 9/30/20  Yes Bogdan Diaz,    omega-3 acid ethyl esters (LOVAZA) 1 g capsule Take 2 capsules by mouth 2 times daily 9/1/20  Yes Magdy Alvarez,    spironolactone (ALDACTONE) 25 MG tablet Take 1 tablet by mouth daily 8/27/20  Yes Magdy Alvarez DO   montelukast (SINGULAIR) 10 MG tablet Take 1 tablet by mouth nightly 8/27/20  Yes Magdy Alvarez DO   albuterol sulfate  (90 Base) MCG/ACT inhaler Inhale 2 puffs into the lungs 4 times daily as needed for Wheezing 8/27/20  Yes Bogdan Diaz DO   metoprolol succinate (TOPROL XL) 100 MG extended release tablet Take 1 tablet by mouth daily 8/25/20  Yes Bogdan Diaz DO   ferrous sulfate (IRON 325) 325 (65 Fe) MG tablet Take 1 tablet by mouth daily (with breakfast) 8/25/20  Yes Bogdan Diaz DO   acetaminophen (TYLENOL) 325 MG tablet Take 650 mg by mouth every 6 hours as needed for Pain   Yes Historical Provider, MD       ALLERGIES   Statins    REVIEW OF SYSTEMS:  12 point review of system was done in detail with the patient and is negative except as above in HPI.     PHYSICAL EXAM:  VS: BP (!) 145/64   Pulse 106   Temp 98.9 °F (37.2 °C) (Temporal)   Resp 20   Ht 5' 3\" (1.6 m)   Wt 267 lb (121.1 kg)   LMP  (LMP Unknown)   SpO2 94%   BMI 47.30 kg/m²   General Appearance: alert and oriented to person, place and time, in no acute distress  HEENT: normocephalic and atraumatic, pupils equal, round, and reactive to light, Ssupple and non-tender without mass, no thyromegaly   Cardiovascular: normal rate, regular rhythm, normal S1 and S2, no murmurs, rubs, clicks, or gallops, distal pulses intact, no carotid bruits, no JVD  Pulmonary/Chest: Diminished breath sounds bilaterally, bilateral crackles heard  Abdomen: soft, non-tender, non-distended, normal bowel sounds, no masses   Extremities: no cyanosis, clubbing or edema, pulse   Neurological: alert, oriented, normal speech, no focal findings or movement disorder noted  Skin: Significant lymphedema in the lower extremity, chronic venous stasis, stasis dermatitis, more than +3+ for bilateral lower extremity edema, distal pulses barely palpable but extremities are warm. Multiple areas with skin abrasion and fluid leaking.     LABS:  Recent Results (from the past 24 hour(s))   CBC Auto Differential    Collection Time: 04/09/21  7:43 PM   Result Value Ref Range    WBC 7.6 4.5 - 11.5 E9/L    RBC 3.62 3.50 - 5.50 E12/L    Hemoglobin 8.6 (L) 11.5 - 15.5 g/dL    Hematocrit 29.7 (L) 34.0 - 48.0 %    MCV 82.0 80.0 - 99.9 fL    MCH 23.8 (L) 26.0 - 35.0 pg    MCHC 29.0 (L) 32.0 - 34.5 %    RDW 15.5 (H) 11.5 - 15.0 fL    Platelets 151 538 - 420 E9/L    MPV 9.1 7.0 - 12.0 fL    Neutrophils % 76.4 43.0 - 80.0 %    Immature Granulocytes % 0.4 0.0 - 5.0 %    Lymphocytes % 13.9 (L) 20.0 - 42.0 %    Monocytes % 7.3 2.0 - 12.0 %    Eosinophils % 1.7 0.0 - 6.0 %    Basophils % 0.3 0.0 - 2.0 %    Neutrophils Absolute 5.79 1.80 - 7.30 E9/L    Immature Granulocytes # 0.03 E9/L    Lymphocytes Absolute 1.05 (L) 1.50 - 4.00 E9/L    Monocytes Absolute 0.55 0.10 - 0.95 E9/L    Eosinophils Absolute 0.13 0.05 - 0.50 E9/L    Basophils Absolute 0.02 0.00 - 0.20 E9/L   Comprehensive Metabolic Panel w/ Reflex to MG    Collection Time: 04/09/21  7:43 PM   Result Value Ref Range    Sodium 139 132 - 146 mmol/L    Potassium reflex Magnesium 5.2 (H) 3.5 - 5.0 mmol/L    Chloride 101 98 - 107 mmol/L    CO2 29 22 - 29 mmol/L    Anion Gap 9 7 - 16 mmol/L    Glucose 391 (H) 74 - 99 mg/dL    BUN 21 8 - 23 mg/dL    CREATININE 0.8 0.5 - 1.0 mg/dL    GFR Non-African American >60 >=60 mL/min/1.73    GFR African American >60     Calcium 8.9 8.6 - 10.2 mg/dL    Total Protein 6.1 (L) 6.4 - 8.3 g/dL    Albumin 3.1 (L) 3.5 - 5.2 g/dL    Total Bilirubin <0.2 0.0 - 1.2 mg/dL    Alkaline Phosphatase 90 35 - 104 U/L    ALT 7 0 - 32 U/L    AST 8 0 - 31 U/L   Troponin    Collection Time: 04/09/21  7:43 PM   Result Value Ref Range    Troponin <0.01 0.00 - 0.03 ng/mL   Brain Natriuretic Peptide    Collection Time: 04/09/21  7:43 PM   Result Value Ref Range    Pro- (H) 0 - 125 pg/mL   Lactate, Sepsis    Collection Time: 04/09/21  7:43 PM   Result Value Ref Range    Lactic Acid, Sepsis 2.0 (H) 0.5 - 1.9 mmol/L   Lactate, Sepsis    Collection Time: 04/10/21 12:31 AM   Result Value Ref Range    Lactic Acid, Sepsis 1.1 0.5 - 1.9 mmol/L       RADIOLOGY:  Ct Pelvis Wo Contrast Additional Contrast? None    Result Date: 4/9/2021  EXAMINATION: CT OF THE PELVIS WITHOUT CONTRAST 4/9/2021 10:59 pm TECHNIQUE: CT of the pelvis was performed without the administration of intravenous contrast.  Multiplanar reformatted images are provided for review. Dose modulation, iterative reconstruction, and/or weight based adjustment of the mA/kV was utilized to reduce the radiation dose to as low as reasonably achievable. COMPARISON: None.  HISTORY: ORDERING SYSTEM PROVIDED HISTORY: coccyx tenderness to palpation TECHNOLOGIST PROVIDED HISTORY: Reason for exam:->coccyx tenderness to palpation Additional Contrast?->None Decision Support Exception->Emergency Medical Condition (MA) What reading provider will be dictating this exam?->CRC FINDINGS: Nonspecific soft tissue density anterior to the tip of the coccyx with possible erosive changes involving the anterior cortex of the distal coccygeal segment. This is best demonstrated on axial image 85. Nuclear medicine bone scan may be helpful to further characterize if clinically warranted. The soft tissues are unremarkable. The visualized large and small bowel the urinary bladder is grossly. No free air or free fluid in the pelvis. Degenerative changes involving the SI joints. Disc space narrowing L4-L5 with grade 1 anterolisthesis L4 over L5. Facet joint degenerative changes. Nonspecific soft tissue density anterior to the tip of the coccyx with possible erosive changes involving the anterior cortex of the distal coccygeal segment. This is best demonstrated on axial image 85. Nuclear medicine bone scan may be helpful to further characterize if clinically warranted. Xr Chest Portable    Result Date: 4/9/2021  EXAMINATION: ONE XRAY VIEW OF THE CHEST 4/9/2021 8:17 pm COMPARISON: January 16, 2021 HISTORY: ORDERING SYSTEM PROVIDED HISTORY: SOB, hx copd chf TECHNOLOGIST PROVIDED HISTORY: Reason for exam:->SOB, hx copd chf What reading provider will be dictating this exam?->CRC FINDINGS: Moderate cardiomegaly. There is prominence of pulmonary vasculature. Hazy opacities are present bilaterally. No obvious pleural effusion. No pneumothorax. Cardiomegaly with pulmonary vascular congestion and developing interstitial pulmonary edema or pneumonia throughout right lung. ASSESSMENT AND PLAN  Active Problems:    Cellulitis  Resolved Problems:    * No resolved hospital problems. *    Impression  1. Acute on chronic diastolic congestive heart failure  2. Hypoxic respiratory failure in the setting of above  3. Acute worsening of lymphedema with possible cellulitis of lower extremities in the setting of above  4. History of atrial fibrillation on Eliquis  5.  Morbid obesity obstructive sleep apnea and obesity hypoventilation syndrome  6. History of anxiety and depression  7. History of COPD  8. History of diabetes mellitus type 2  9. History of hypertension  10. History of hyperlipidemia  11. History of GERD  12. Patient is full code    Plan  · We'll start patient on IV diuretics, will get echocardiogram.  Will consult cardiology for evaluation  · We'll also start patient on broad-spectrum antibiotic with a concern for lower extremity cellulitis, given comorbidities will cover with broad-spectrum antibiotic. Will consult infectious disease for evaluation  · Consult wound care  · Continue home medication chronic stable medical condition  · Incoming hospitalist to follow-up    DVT prophylaxis: Eliquis  CODE STATUS: Patient is a full code  Discharge plan: Patient can be discharged next 3 to 4 days to home with and without home health.,  Pending clinical improvement, pending work-up and clearance by consulting services. Please note that over 35 minutes was spent in evaluating the patient, review of records and results, discussion with staff/family, etc.    Jyotsna Contreras MD  [facility]   [phone]    NOTE: This report was transcribed using voice recognition software. Every effort was made to ensure accuracy; however, inadvertent computerized transcription errors may be present.

## 2021-04-10 NOTE — PROGRESS NOTES
Pharmacy Consultation Note  (Antibiotic Dosing and Monitoring)    Initial consult date: 4-10-21  Consulting physician: Dr. Lissette Allen  Drug: Vancomycin  Indication: skin and soft tissue. Age/  Gender Height Weight IBW Dosing weight  Allergy Information   65 y.o./female @FLOW(11:first:1)@ @FLOW[14:FIRST:1@     Ideal body weight: 52.4 kg (115 lb 8.3 oz)  Adjusted ideal body weight: 79.9 kg (176 lb 1.8 oz)    Statins      @TMAX(24)@        Date  WBC BUN SCr CrCl  (mL/min) Drug/Dose Time   Given Level(s)   (Time) Comments        Vancomycin 2500 mg IV                                              Intake/Output Summary (Last 24 hours) at 4/10/2021 0601  Last data filed at 4/10/2021 0542  Gross per 24 hour   Intake --   Output 350 ml   Net -350 ml       Historical Cultures:  Organism   Date Value Ref Range Status   11/30/2018 Candida albicans (A)  Final   11/30/2018 Pseudomonas aeruginosa (A)  Final   11/30/2018 Escherichia coli (A)  Final   11/30/2018 Strep agalactiae (Beta Strep Group B) (A)  Final   11/30/2018 Gram negative yogesh (A)  Final     No results for input(s): BC in the last 72 hours. Cultures:  available culture and sensitivity results were reviewed in EPIC    Assessment:  77 y.o. female has been initiated Vancomycin. Estimated CrCl = 86 mL/min  Goal trough level = 15-20 mcg/mL    Plan:  Pt received a dose of vanco today at 4-10-21 @ 02:16.  Clinical pharmacy will establish a dosing regimen later this AM.   Monitor renal function   Clinical pharmacy to follow      Josephine Okeefe [facility] 4/10/2021 6:01 AM

## 2021-04-11 LAB
ANION GAP SERPL CALCULATED.3IONS-SCNC: 6 MMOL/L (ref 7–16)
ANISOCYTOSIS: ABNORMAL
BASOPHILS ABSOLUTE: 0.03 E9/L (ref 0–0.2)
BASOPHILS RELATIVE PERCENT: 0.5 % (ref 0–2)
BUN BLDV-MCNC: 21 MG/DL (ref 8–23)
CALCIUM SERPL-MCNC: 8.5 MG/DL (ref 8.6–10.2)
CHLORIDE BLD-SCNC: 96 MMOL/L (ref 98–107)
CO2: 35 MMOL/L (ref 22–29)
CREAT SERPL-MCNC: 1 MG/DL (ref 0.5–1)
EOSINOPHILS ABSOLUTE: 0.16 E9/L (ref 0.05–0.5)
EOSINOPHILS RELATIVE PERCENT: 2.4 % (ref 0–6)
GFR AFRICAN AMERICAN: >60
GFR NON-AFRICAN AMERICAN: 55 ML/MIN/1.73
GLUCOSE BLD-MCNC: 306 MG/DL (ref 74–99)
HBA1C MFR BLD: 10.4 % (ref 4–5.6)
HCT VFR BLD CALC: 28 % (ref 34–48)
HCT VFR BLD CALC: 32.4 % (ref 34–48)
HEMOGLOBIN: 8 G/DL (ref 11.5–15.5)
HEMOGLOBIN: 9.4 G/DL (ref 11.5–15.5)
HYPOCHROMIA: ABNORMAL
IMMATURE GRANULOCYTES #: 0.02 E9/L
IMMATURE GRANULOCYTES %: 0.3 % (ref 0–5)
LYMPHOCYTES ABSOLUTE: 1.02 E9/L (ref 1.5–4)
LYMPHOCYTES RELATIVE PERCENT: 15.5 % (ref 20–42)
MCH RBC QN AUTO: 23.9 PG (ref 26–35)
MCHC RBC AUTO-ENTMCNC: 28.6 % (ref 32–34.5)
MCV RBC AUTO: 83.6 FL (ref 80–99.9)
METER GLUCOSE: 233 MG/DL (ref 74–99)
METER GLUCOSE: 331 MG/DL (ref 74–99)
METER GLUCOSE: 344 MG/DL (ref 74–99)
METER GLUCOSE: 363 MG/DL (ref 74–99)
METER GLUCOSE: 394 MG/DL (ref 74–99)
MONOCYTES ABSOLUTE: 0.57 E9/L (ref 0.1–0.95)
MONOCYTES RELATIVE PERCENT: 8.7 % (ref 2–12)
NEUTROPHILS ABSOLUTE: 4.77 E9/L (ref 1.8–7.3)
NEUTROPHILS RELATIVE PERCENT: 72.6 % (ref 43–80)
PDW BLD-RTO: 15.4 FL (ref 11.5–15)
PLATELET # BLD: 315 E9/L (ref 130–450)
PMV BLD AUTO: 9.6 FL (ref 7–12)
POLYCHROMASIA: ABNORMAL
POTASSIUM SERPL-SCNC: 4.4 MMOL/L (ref 3.5–5)
RBC # BLD: 3.35 E12/L (ref 3.5–5.5)
SODIUM BLD-SCNC: 137 MMOL/L (ref 132–146)
WBC # BLD: 6.6 E9/L (ref 4.5–11.5)

## 2021-04-11 PROCEDURE — 99233 SBSQ HOSP IP/OBS HIGH 50: CPT | Performed by: INTERNAL MEDICINE

## 2021-04-11 PROCEDURE — 94640 AIRWAY INHALATION TREATMENT: CPT

## 2021-04-11 PROCEDURE — 2700000000 HC OXYGEN THERAPY PER DAY

## 2021-04-11 PROCEDURE — 6370000000 HC RX 637 (ALT 250 FOR IP): Performed by: EMERGENCY MEDICINE

## 2021-04-11 PROCEDURE — 82533 TOTAL CORTISOL: CPT

## 2021-04-11 PROCEDURE — 6370000000 HC RX 637 (ALT 250 FOR IP): Performed by: FAMILY MEDICINE

## 2021-04-11 PROCEDURE — 6370000000 HC RX 637 (ALT 250 FOR IP): Performed by: INTERNAL MEDICINE

## 2021-04-11 PROCEDURE — 82962 GLUCOSE BLOOD TEST: CPT

## 2021-04-11 PROCEDURE — 2500000003 HC RX 250 WO HCPCS: Performed by: NURSE PRACTITIONER

## 2021-04-11 PROCEDURE — 2580000003 HC RX 258: Performed by: EMERGENCY MEDICINE

## 2021-04-11 PROCEDURE — 6370000000 HC RX 637 (ALT 250 FOR IP): Performed by: CLINICAL NURSE SPECIALIST

## 2021-04-11 PROCEDURE — 83036 HEMOGLOBIN GLYCOSYLATED A1C: CPT

## 2021-04-11 PROCEDURE — 85025 COMPLETE CBC W/AUTO DIFF WBC: CPT

## 2021-04-11 PROCEDURE — 6360000002 HC RX W HCPCS: Performed by: EMERGENCY MEDICINE

## 2021-04-11 PROCEDURE — 85018 HEMOGLOBIN: CPT

## 2021-04-11 PROCEDURE — 2060000000 HC ICU INTERMEDIATE R&B

## 2021-04-11 PROCEDURE — 36415 COLL VENOUS BLD VENIPUNCTURE: CPT

## 2021-04-11 PROCEDURE — 80048 BASIC METABOLIC PNL TOTAL CA: CPT

## 2021-04-11 PROCEDURE — 85014 HEMATOCRIT: CPT

## 2021-04-11 PROCEDURE — 6370000000 HC RX 637 (ALT 250 FOR IP): Performed by: SPECIALIST

## 2021-04-11 RX ORDER — EZETIMIBE 10 MG/1
10 TABLET ORAL NIGHTLY
Status: DISCONTINUED | OUTPATIENT
Start: 2021-04-11 | End: 2021-04-17 | Stop reason: HOSPADM

## 2021-04-11 RX ORDER — DOXYCYCLINE HYCLATE 100 MG/1
100 CAPSULE ORAL EVERY 12 HOURS SCHEDULED
Status: DISCONTINUED | OUTPATIENT
Start: 2021-04-11 | End: 2021-04-17 | Stop reason: HOSPADM

## 2021-04-11 RX ORDER — INSULIN GLARGINE 100 [IU]/ML
60 INJECTION, SOLUTION SUBCUTANEOUS 2 TIMES DAILY
Status: DISCONTINUED | OUTPATIENT
Start: 2021-04-11 | End: 2021-04-12

## 2021-04-11 RX ORDER — LEVOFLOXACIN 500 MG/1
500 TABLET, FILM COATED ORAL DAILY
Status: DISCONTINUED | OUTPATIENT
Start: 2021-04-11 | End: 2021-04-17 | Stop reason: HOSPADM

## 2021-04-11 RX ADMIN — INSULIN LISPRO 12 UNITS: 100 INJECTION, SOLUTION INTRAVENOUS; SUBCUTANEOUS at 06:54

## 2021-04-11 RX ADMIN — ACETAMINOPHEN 650 MG: 325 TABLET ORAL at 21:36

## 2021-04-11 RX ADMIN — GABAPENTIN 300 MG: 300 CAPSULE ORAL at 09:44

## 2021-04-11 RX ADMIN — TRAZODONE HYDROCHLORIDE 50 MG: 50 TABLET ORAL at 20:46

## 2021-04-11 RX ADMIN — PIPERACILLIN AND TAZOBACTAM 3375 MG: 3; .375 INJECTION, POWDER, LYOPHILIZED, FOR SOLUTION INTRAVENOUS at 06:53

## 2021-04-11 RX ADMIN — INSULIN LISPRO 40 UNITS: 100 INJECTION, SOLUTION INTRAVENOUS; SUBCUTANEOUS at 17:43

## 2021-04-11 RX ADMIN — APIXABAN 5 MG: 5 TABLET, FILM COATED ORAL at 09:44

## 2021-04-11 RX ADMIN — OMEGA-3-ACID ETHYL ESTERS 2 G: 1 CAPSULE, LIQUID FILLED ORAL at 09:43

## 2021-04-11 RX ADMIN — TRAMADOL HYDROCHLORIDE 50 MG: 50 TABLET, COATED ORAL at 21:36

## 2021-04-11 RX ADMIN — FERROUS SULFATE TAB 325 MG (65 MG ELEMENTAL FE) 325 MG: 325 (65 FE) TAB at 09:44

## 2021-04-11 RX ADMIN — Medication 10 ML: at 20:47

## 2021-04-11 RX ADMIN — IPRATROPIUM BROMIDE AND ALBUTEROL SULFATE 1 AMPULE: .5; 3 SOLUTION RESPIRATORY (INHALATION) at 19:59

## 2021-04-11 RX ADMIN — ACETAMINOPHEN 650 MG: 325 TABLET ORAL at 13:47

## 2021-04-11 RX ADMIN — PANTOPRAZOLE SODIUM 40 MG: 40 TABLET, DELAYED RELEASE ORAL at 06:53

## 2021-04-11 RX ADMIN — ESCITALOPRAM 20 MG: 10 TABLET, FILM COATED ORAL at 09:43

## 2021-04-11 RX ADMIN — LINEZOLID 600 MG: 600 TABLET, FILM COATED ORAL at 09:44

## 2021-04-11 RX ADMIN — APIXABAN 5 MG: 5 TABLET, FILM COATED ORAL at 20:47

## 2021-04-11 RX ADMIN — BUMETANIDE 2 MG: 0.25 INJECTION INTRAMUSCULAR; INTRAVENOUS at 09:44

## 2021-04-11 RX ADMIN — SPIRONOLACTONE 25 MG: 25 TABLET ORAL at 09:43

## 2021-04-11 RX ADMIN — INSULIN LISPRO 12 UNITS: 100 INJECTION, SOLUTION INTRAVENOUS; SUBCUTANEOUS at 17:42

## 2021-04-11 RX ADMIN — TRAMADOL HYDROCHLORIDE 50 MG: 50 TABLET, COATED ORAL at 09:55

## 2021-04-11 RX ADMIN — Medication 10 ML: at 09:45

## 2021-04-11 RX ADMIN — DILTIAZEM HYDROCHLORIDE 240 MG: 240 CAPSULE, EXTENDED RELEASE ORAL at 09:43

## 2021-04-11 RX ADMIN — IPRATROPIUM BROMIDE AND ALBUTEROL SULFATE 1 AMPULE: .5; 3 SOLUTION RESPIRATORY (INHALATION) at 11:37

## 2021-04-11 RX ADMIN — INSULIN GLARGINE 50 UNITS: 100 INJECTION, SOLUTION SUBCUTANEOUS at 10:03

## 2021-04-11 RX ADMIN — MONTELUKAST SODIUM 10 MG: 10 TABLET ORAL at 20:46

## 2021-04-11 RX ADMIN — METOPROLOL SUCCINATE 100 MG: 100 TABLET, EXTENDED RELEASE ORAL at 09:44

## 2021-04-11 RX ADMIN — GABAPENTIN 300 MG: 300 CAPSULE ORAL at 13:47

## 2021-04-11 RX ADMIN — INSULIN LISPRO 6 UNITS: 100 INJECTION, SOLUTION INTRAVENOUS; SUBCUTANEOUS at 21:43

## 2021-04-11 RX ADMIN — TRAMADOL HYDROCHLORIDE 50 MG: 50 TABLET, COATED ORAL at 16:23

## 2021-04-11 RX ADMIN — LEVOFLOXACIN 500 MG: 500 TABLET, FILM COATED ORAL at 16:22

## 2021-04-11 RX ADMIN — EZETIMIBE 10 MG: 10 TABLET ORAL at 20:45

## 2021-04-11 RX ADMIN — INSULIN LISPRO 40 UNITS: 100 INJECTION, SOLUTION INTRAVENOUS; SUBCUTANEOUS at 12:37

## 2021-04-11 RX ADMIN — IPRATROPIUM BROMIDE AND ALBUTEROL SULFATE 1 AMPULE: .5; 3 SOLUTION RESPIRATORY (INHALATION) at 17:03

## 2021-04-11 RX ADMIN — GABAPENTIN 300 MG: 300 CAPSULE ORAL at 20:59

## 2021-04-11 RX ADMIN — IPRATROPIUM BROMIDE AND ALBUTEROL SULFATE 1 AMPULE: .5; 3 SOLUTION RESPIRATORY (INHALATION) at 01:36

## 2021-04-11 RX ADMIN — DOXYCYCLINE HYCLATE 100 MG: 100 CAPSULE ORAL at 20:46

## 2021-04-11 RX ADMIN — INSULIN GLARGINE 60 UNITS: 100 INJECTION, SOLUTION SUBCUTANEOUS at 21:40

## 2021-04-11 RX ADMIN — OMEGA-3-ACID ETHYL ESTERS 2 G: 1 CAPSULE, LIQUID FILLED ORAL at 20:46

## 2021-04-11 RX ADMIN — BUMETANIDE 2 MG: 0.25 INJECTION INTRAMUSCULAR; INTRAVENOUS at 20:46

## 2021-04-11 RX ADMIN — IPRATROPIUM BROMIDE AND ALBUTEROL SULFATE 1 AMPULE: .5; 3 SOLUTION RESPIRATORY (INHALATION) at 07:59

## 2021-04-11 RX ADMIN — TERBINAFINE 250 MG: 250 TABLET ORAL at 09:43

## 2021-04-11 ASSESSMENT — PAIN SCALES - GENERAL
PAINLEVEL_OUTOF10: 6
PAINLEVEL_OUTOF10: 7
PAINLEVEL_OUTOF10: 6

## 2021-04-11 ASSESSMENT — PAIN DESCRIPTION - LOCATION: LOCATION: FOOT;LEG;SACRUM

## 2021-04-11 NOTE — CONSULTS
ENDOCRINOLOGY INITIAL CONSULTATION NOTE  [facility]      Date of admission: 4/9/2021  Date of service: 4/12/2021  Admitting physician: Ghulam Huitron MD   Primary Care Physician: Janny Menendez  Consultant physician: Ori Galan MD     Reason for the consultation:  Uncontrolled DM type 2     History of Present Illness:  Services were provided through a video synchronous discussion     Orville Amezcua is a 77 y.o. old female with PMH of DM type 2, lymphedema with chronic wounds, COPD,on Ox, HTN, Afib and other listed below admitted to [address] [place] [address] on 4/9/2021 because of AMDRID and weeping from BLE. Endocrine service was consulted for diabetes management. The patient was in her usual state of health until few days ago when started having worsening MADRID. She denies fever, chills, cought, CP, orthopnea or PND. Up on arrival to ER,  WBC 7.6, Hb 8.6. K 5.2. BUN/SCr 21/0.8. ProBNP 295. Troponin <0.01. CXR with cardiomegaly with pulmonary vascular congestion. EKG NSR, CT of the pelvis with nonspecific soft tissue density anterior to the tip of the coccyx with possible erosive changes involving the anterior cortex of the distal coccygeal segment     Prior to admission  The patient was diagnosed with type 2 DM at the age 38. Prior to admission patient was on Lantus 50U BID, Humalog high sliding scale. The patient was also on U500 insulin at on point. Patient has not been eating consistent carbohydrate meals, self-blood glucose monitoring has been highly variable prior to admission. In addition, patient reported h/o neuropathy.    Lab Results   Component Value Date    LABA1C 10.4 04/11/2021     Inpatient diet:   Carb Restricted diet     Point of care glucose monitoring (Independently reviewed)   Recent Labs     04/11/21  0620 04/11/21  1002 04/11/21  1153 04/11/21  1625 04/11/21  2059 04/12/21  0633 04/12/21  1158 04/12/21  1720   GLUMET 331* 394* 363* 344* 233* 174* 192* 188*       Past medical history:   Past Medical ENDOSCOPY, COLON, DIAGNOSTIC      FRACTURE SURGERY      R leg fracture with surgery for repair    JOINT REPLACEMENT  2003    L knee    VT DEBRIDEMENT, SKIN, SUB-Q TISSUE,MUSCLE,=<20 SQ CM Left 11/30/2018    LEFT LEG EXCISIONAL  DEBRIDEMENT WITH TISSUE BIOPSY performed by Bean Rosales DPM at [address] ENDOSCOPY  1/20/2004    gastritis, bx (no H pylori), Dr. Hilaria Garcia, [address] ENDOSCOPY N/A 6/1/2018    EGD BIOPSY performed by Ashia Robb MD at [address] N/A 11/9/2018    EGD BIOPSY performed by Ashia Robb MD at [address] history:   Tobacco:   reports that she quit smoking about 16 years ago. Her smoking use included cigarettes. She started smoking about 41 years ago. She has a 37.50 pack-year smoking history. She has never used smokeless tobacco.  Alcohol:   reports previous alcohol use. Drugs:   reports previous drug use. Family history:    Family History   Problem Relation Age of Onset    Heart Disease Mother     Diabetes Father     Colon Cancer Father     Other Father         BLINDNESS    Colon Cancer Sister     Other Sister         shingles- has had over 1 year    Diabetes Paternal Aunt     Diabetes Paternal Uncle     Diabetes Paternal Aunt     Diabetes Paternal Uncle     Diabetes Sister        Allergy and drug reactions:    Allergies   Allergen Reactions    Statins Other (See Comments)     Muscle pains       Scheduled Meds:   insulin glargine  55 Units Subcutaneous BID    bumetanide  2 mg Intravenous BID    ezetimibe  10 mg Oral Nightly    insulin lispro  40 Units Subcutaneous TID WC    levoFLOXacin  500 mg Oral Daily    doxycycline hyclate  100 mg Oral 2 times per day    insulin lispro  0-18 Units Subcutaneous 4x Daily AC & HS    apixaban  5 mg Oral BID    dilTIAZem  240 mg Oral Daily    escitalopram  20 mg Oral Daily    gabapentin  300 mg Result   Cardiomegaly with pulmonary vascular congestion and developing interstitial   pulmonary edema or pneumonia throughout right lung. NM BONE SCAN 3 PHASE    (Results Pending)       Medical Records/Labs/Images review:   I personally reviewed and summarized previous records   All labs and imaging were reviewed independently     Chris Preston, a 77 y.o.-old female seen today for inpatient diabetes management     Diabetes Mellitus type 2  · Uncontrolled, A1c 15.7%  · Will change insulin regimen to    ? lantus 55 units BID   ? Humalog 40 units with meals   ? High dose sliding scale with meals and at nigth   · Continue glucose check with meals and at bedtime   · Will titrate insulin dose based on the blood glucose trend & insulin requirement     Rt adrenal mass  · CT adrenal  5/30/2020 --> high pre-contrast Hounsfield units (34) with late phase washout 49 Hounsfield units makings lesion undetermined. This lesion has increased size when comparison is made with previous study of April 2019. Increased size of the right adrenal gland lesion up to 3.4 x 3 cm, can be of can indicate a suspicious lesion. · Previous endocrine work up 5/2020 showed normal plasma metanephrines but indeterminate 1 mg DST   · With obesity, high BP and diabetes will order late night saliva cortisol,&24 hrs urine free cortisol to r/o Cushing's   · Pt on Spironolactone and for this reason we can't do Renin/Patel   · If work up was negative for Cushing's. Pt will need biopsy of this adrenal lesion     Recurrent Cellulitis LE   · Management per ID  · Discussed the importance of controlling DM in management of LE wounds     Afib/acute on chronic CHF  · Cardiology on board    The above issues were reviewed with the patient who understood and agreed with the plan. Thank you for allowing us to participate in the care of this patient. Please do not hesitate to contact us with any additional questions.      Skippy Oliver MD  Endocrinologist, [facility]   [address] [facility] [address]   Phone: [phone]  Fax: [phone]  -------------------------  An electronic signature was used to authenticate this note.  Kelly Campos MD on 4/12/2021 at 8:11 PM    This visit was performed as a virtual video visit using a synchronous, two-way, audio-video telehealth technology platform

## 2021-04-11 NOTE — PROGRESS NOTES
INPATIENT CARDIOLOGY FOLLOW-UP    Name: Nany Gurrola    Age: 77 y.o. Date of Admission: 4/9/2021  7:07 PM    Date of Service: 4/11/2021    Primary Cardiologist: Dr. Clemente Ventura    Chief Complaint: Follow-up for HFpEF    Interim History:  Still short of breath with exertion. Denies chest pain.     Diuresing well net -3 L    Review of Systems:   Negative except as described above    Problem List:  Patient Active Problem List   Diagnosis    Uncontrolled diabetes mellitus ([address]    Depression    Hypertension    Hyperlipidemia    Osteoarthritis    PAF (paroxysmal atrial fibrillation) ([address] - currently in SR    Pulmonary hypertension    Chronic sinusitis    Chronic pain    Steatohepatitis    Baker's cyst of knee    Diabetic neuropathy (HCC)    Iron deficiency    LVH (left ventricular hypertrophy)    Chronic anal fissure    Positive hepatitis C antibody test    PFO (patent foramen ovale)    Chronic diastolic heart failure (HCC)    Physical deconditioning    Lymphedema of both lower extremities    Chronic anemia    Dyspnea on exertion    Chronic deep vein thrombosis (DVT) of distal vein of right lower extremity (HCC)    GI bleed    Anxiety and depression    Chronic anticoagulation    COPD exacerbation (HCC)    Acute on chronic respiratory failure with hypoxia (HCC)    Blood loss anemia    Sepsis ([address]    Cellulitis of left lower extremity    Poorly controlled diabetes mellitus ([address]    Lymphedema    Septicemia ([address]    Acute diastolic congestive heart failure (HCC)    Acute on chronic anemia    Morbid obesity (HCC)    Acute hypoxemic respiratory failure (HCC)    Diastolic CHF, acute on chronic (HCC)    Cellulitis    Stress fracture of right fibula    Atrial fibrillation, currently in sinus rhythm    Gastroesophageal reflux disease without esophagitis    Ulcers of both lower legs (HCC)    Chronic respiratory failure with hypoxia (HCC)    Abscess and cellulitis of gluteal region    CHF (congestive heart failure), NYHA class I, acute on chronic, combined (HCC)    Adrenal mass ([facility])    Hyperglycemia    Atrial fibrillation with RVR ([facility])       Current Medications:    Current Facility-Administered Medications:     apixaban (ELIQUIS) tablet 5 mg, 5 mg, Oral, BID, Dov Shell MD, 5 mg at 04/10/21 2041    dilTIAZem (CARDIZEM CD) extended release capsule 240 mg, 240 mg, Oral, Daily, Dov Shell MD, Stopped at 04/10/21 0720    escitalopram (LEXAPRO) tablet 20 mg, 20 mg, Oral, Daily, Dov Shell MD, 20 mg at 04/10/21 5588    gabapentin (NEURONTIN) capsule 300 mg, 300 mg, Oral, TID, Dov Shell MD, 300 mg at 04/10/21 2041    ipratropium-albuterol (DUONEB) nebulizer solution 3 mL, 1 vial, Nebulization, Q6H PRN, Dov Shell MD, 3 mL at 04/10/21 0813    pantoprazole (PROTONIX) tablet 40 mg, 40 mg, Oral, QAM AC, Dov Shell MD, 40 mg at 04/11/21 0653    metoprolol succinate (TOPROL XL) extended release tablet 100 mg, 100 mg, Oral, Daily, Dov Shell MD, 100 mg at 04/10/21 9950    traZODone (DESYREL) tablet 50 mg, 50 mg, Oral, Nightly, Dov Shell MD, 50 mg at 04/10/21 2041    piperacillin-tazobactam (ZOSYN) 3,375 mg in dextrose 5 % 100 mL IVPB extended infusion (mini-bag), 3,375 mg, Intravenous, Q8H, Dov Shell MD, Last Rate: 25 mL/hr at 04/11/21 0653, 3,375 mg at 04/11/21 0653    miconazole (MICOTIN) 2 % powder, , Topical, BID PRN, Jackelyn Wall DO    white petrolatum ointment 30 mL, 30 mL, Topical, TID PRN, Jackelyn Wall DO    montelukast (SINGULAIR) tablet 10 mg, 10 mg, Oral, Nightly, Jackelyn Wall DO, 10 mg at 04/10/21 2041    omega-3 acid ethyl esters (LOVAZA) capsule 2 g, 2 g, Oral, BID, Jackelyn Wall DO, 2 g at 04/10/21 2041    spironolactone (ALDACTONE) tablet 25 mg, 25 mg, Oral, Daily, Jackelyn Wall DO, 25 mg at 04/10/21 1439    [START ON 4/12/2021] vitamin D (ERGOCALCIFEROL) capsule 50,000 Units, 50,000 Units, Oral, Weekly, Ghazala Boss DO    ferrous sulfate (IRON 325) tablet 325 mg, 325 mg, Oral, Daily with breakfast, Ghazala Boss DO, 325 mg at 04/10/21 0923    ipratropium-albuterol (DUONEB) nebulizer solution 1 ampule, 1 ampule, Inhalation, Q4H WA, Ghazala Boss DO, 1 ampule at 04/11/21 0759    glucose (GLUTOSE) 40 % oral gel 15 g, 15 g, Oral, PRN, Ghazala Boss DO    dextrose 50 % IV solution, 12.5 g, Intravenous, PRN, Ghazala Boss DO    glucagon (rDNA) injection 1 mg, 1 mg, Intramuscular, PRN, Ghazala Boss DO    dextrose 5 % solution, 100 mL/hr, Intravenous, PRN, Ghazala Boss DO    insulin glargine (LANTUS) injection vial 50 Units, 50 Units, Subcutaneous, BID, Ghazala Boss DO, 50 Units at 04/10/21 2039    insulin lispro (HUMALOG) injection vial 0-18 Units, 0-18 Units, Subcutaneous, TID WC, Ghazala Boss DO, 12 Units at 04/11/21 0654    insulin lispro (HUMALOG) injection vial 0-9 Units, 0-9 Units, Subcutaneous, Nightly, Ghazala Boss DO, 6 Units at 04/10/21 2039    bumetanide (BUMEX) injection 2 mg, 2 mg, Intravenous, BID, RAUL Thomas CNP, 2 mg at 04/10/21 2108    traMADol (ULTRAM) tablet 50 mg, 50 mg, Oral, Q6H PRN, Ghazala Boss DO, 50 mg at 04/10/21 2108    linezolid (ZYVOX) tablet 600 mg, 600 mg, Oral, 2 times per day, Sultana Castellon MD, 600 mg at 04/10/21 2321    terbinafine (LAMISIL) tablet 250 mg, 250 mg, Oral, Daily, Sultana Castellon MD, 250 mg at 04/10/21 1720    sodium chloride flush 0.9 % injection 5-40 mL, 5-40 mL, Intravenous, 2 times per day, Rock Alvarado MD, 10 mL at 04/10/21 2043    sodium chloride flush 0.9 % injection 5-40 mL, 5-40 mL, Intravenous, PRN, Rock Alvarado MD    0.9 % sodium chloride infusion, 25 mL, Intravenous, PRN, Rock Alvarado MD    promethazine (PHENERGAN) tablet 12.5 mg, 12.5 mg, Oral, Q6H PRN **OR** ondansetron (ZOFRAN) injection 4 mg, 4 mg, Intravenous, Q6H PRN, Rock Alvarado MD    polyethylene    MPV 9.1     Lab Results   Component Value Date    CKTOTAL 85 01/13/2021    CKMB 2.7 01/13/2021    TROPONINI <0.01 04/09/2021    TROPONINI <0.01 01/13/2021    TROPONINI <0.01 01/12/2021     Lab Results   Component Value Date    INR 1.1 01/12/2021    INR 1.1 05/31/2020    INR 1.5 11/27/2018    PROTIME 12.4 01/12/2021    PROTIME 12.4 05/31/2020    PROTIME 16.4 (H) 11/27/2018     Lab Results   Component Value Date    TSH 0.575 01/15/2021     Lab Results   Component Value Date    LABA1C 10.2 (H) 01/13/2021     No results found for: EAG  Lab Results   Component Value Date    CHOL 355 (H) 01/16/2021    CHOL 287 (H) 05/26/2020    CHOL 318 (H) 11/07/2019     Lab Results   Component Value Date    TRIG 298 (H) 01/16/2021    TRIG 512 (H) 05/26/2020    TRIG 861 (H) 11/07/2019     Lab Results   Component Value Date    HDL 52 01/16/2021    HDL 33 05/26/2020    HDL 28 11/07/2019     Lab Results   Component Value Date    LDLCALC 243 (H) 01/16/2021    LDLCALC - (AA) 05/26/2020    LDLCALC - (AA) 11/07/2019     Lab Results   Component Value Date    LABVLDL 60 01/16/2021    LABVLDL - (AA) 05/26/2020    LABVLDL - (AA) 11/07/2019     No results found for: CHOLHDLRATIO  Recent Labs     04/09/21  1943   PROBNP 295*       Cardiac Tests:    EKG: Sinus rhythm 100 bpm.  Normal axis. Incomplete bundle branch block, QRS duration 106 ms. Telemetry: Sinus rhythm/sinus tach 100 bpm    Chest X-ray:   4/9/2021      Impression   Cardiomegaly with pulmonary vascular congestion and developing interstitial   pulmonary edema or pneumonia throughout right lung. Echocardiogram:   TTE 1/16/2018, Dr. Froylan Poe concentric left ventricular hypertrophy. EF visually estimated at 55-65%. Stage II diastolic dysfunction. The left atrium is severely dilated. Elevated L atrial pressure. Mild mitral regurgitation. The aortic valve appears mildly sclerotic. Pulmonary hypertension is mild to moderate. No evidence of pericardial effusion.

## 2021-04-11 NOTE — PROGRESS NOTES
Comprehensive Nutrition Assessment    Type and Reason for Visit:  Initial, Positive Nutrition Screen    Nutrition Recommendations/Plan: Continue Current Diet, Start Oral Nutrition Supplement  Low Calorie High Protein ONS Daily    Nutrition Assessment:  Pt admit 2/2 gluteal abscess, LE stasis dermatitis and cellulitis. Noted COPD/CHF. PO diet advanced however poor intake.     Malnutrition Assessment:  Malnutrition Status:  No malnutrition    Context:  Chronic Illness     Findings of the 6 clinical characteristics of malnutrition:  Energy Intake:  Mild decrease in energy intake (Comment)  Weight Loss:  No significant weight loss     Body Fat Loss:  No significant body fat loss     Muscle Mass Loss:  No significant muscle mass loss    Fluid Accumulation:  No significant fluid accumulation     Strength:  Not Performed    Nutrition Related Findings:  abd exam WDL, elevated BSL, +4 edema, - I/Os      Wounds:  (weeping cellulitis)       Current Nutrition Therapies:    DIET CARB CONTROL; Low Sodium (2 GM)    Anthropometric Measures:  · Height: 5' 3\" (160 cm)  · Current Body Weight: 307 lb (139.3 kg)(4/11 actual)   · Admission Body Weight: 267 lb (121.1 kg)(4/9 no method)    · Usual Body Weight: 296 lb (134.3 kg)(08/20 per EMR, actual)     · Ideal Body Weight: 115 lbs; % Ideal Body Weight 267 %   · BMI: 54.4   · BMI Categories: Obese Class 3 (BMI 40.0 or greater)       Nutrition Diagnosis:   No nutrition diagnosis at this time     Nutrition Interventions:   Nutrition Education/Counseling:  Education not indicated   Coordination of Nutrition Care:  Continue to monitor while inpatient, Coordination of Community Care    Goals:  Pt to consume >50% of meals and ONS       Nutrition Monitoring and Evaluation:   Food/Nutrient Intake Outcomes:  Food and Nutrient Intake, Supplement Intake  Physical Signs/Symptoms Outcomes:  Biochemical Data, Nutrition Focused Physical Findings, Skin, Weight, GI Status, Fluid Status or Edema,

## 2021-04-11 NOTE — PROGRESS NOTES
Pharmacy Consultation Note  (Antibiotic Dosing and Monitoring)    Vancomycin has been discontinued; pharmacy will sign-off. Please reconsult if needed.     Thank you,  Camille Victoria, PharmD, BCPS 4/11/2021 8:31 AM

## 2021-04-11 NOTE — PROGRESS NOTES
Hospitalist Progress Note      SYNOPSIS: Patient admitted on 2021 for SOB for a few days, leg swelling    Hospital Course: 52-year-old female patient presents to emergency department for increasing shortness of breath over the last few days. Patient with history of COPD, CHF, and chronic lymphedema. Patient on baseline 2 L of oxygen, has had to use 4 L due to increasing shortness of breath. Patient's wound nurse also came to check on her legs and said that her left leg is appearing to be more swollen and red. It appears patient's left leg is cellulitic, there is weeping area of the leg. Patient's chest x-ray notes pulmonary vascular congestion. She was also complaining of sacral/coccygeal pain, denies trauma. CT scan of the pelvis demonstrates erosive changes to the distal coccygeal segment. Denies any fevers, chills, nausea, vomiting, diarrhea, chest pain. SUBJECTIVE:    Patient seen and examined  Records reviewed. Patient reporting increased coccyxgeal pain. Discussed that bone scan was requested by ID. Discussed diabetes management, patient does take 40 units of humalog with each meal at home, will restart. Stable overnight. No other overnight issues reported. Temp (24hrs), Av.4 °F (36.3 °C), Min:97 °F (36.1 °C), Max:97.6 °F (36.4 °C)    DIET: DIET CARB CONTROL; Low Sodium (2 GM)  CODE: Full Code    Intake/Output Summary (Last 24 hours) at 2021 0923  Last data filed at 2021 0404  Gross per 24 hour   Intake 1070 ml   Output 3475 ml   Net -2405 ml       Review of Systems  All bolded are positive; please see HPI  General:  Fever, chills, diaphoresis, fatigue, malaise, night sweats, weight loss  Psychological:  Anxiety, disorientation, hallucinations. ENT:  Epistaxis, headaches, vertigo, visual changes. Cardiovascular:  Chest pain, irregular heartbeats, palpitations, paroxysmal nocturnal dyspnea.   Respiratory:  Shortness of breath, coughing, sputum production, hemoptysis, wheezing, orthopnea. Gastrointestinal:  Nausea, vomiting, diarrhea, heartburn, constipation, abdominal pain, hematemesis, hematochezia, melena, acholic stools  Genito-Urinary:  Dysuria, urgency, frequency, hematuria  Musculoskeletal:  Joint pain, joint stiffness, joint swelling, muscle pain, left leg pain  Neurology:  Headache, focal neurological deficits, weakness, numbness, paresthesia  Derm:  Rashes, ulcers, excoriations, bruising  Extremities:  Decreased ROM, peripheral edema, mottling      OBJECTIVE:    BP (!) 147/88   Pulse 100   Temp 97.6 °F (36.4 °C) (Oral)   Resp 22   Ht 5' 3\" (1.6 m)   Wt (!) 307 lb 9.6 oz (139.5 kg)   LMP  (LMP Unknown)   SpO2 94%   BMI 54.49 kg/m²     General appearance:  awake, alert, and oriented x3; appears documented age; NAD, no labored breathing, obese  HEENT:  Conjunctivae/corneas clear. Mucous membranes moist.  EOMI. No scleral icterus. Neck: No JVD. Trachea midline. Respiratory: symmetrical; clear to auscultation bilaterally; no wheezes, rhonchi or rales  Cardiovascular: rhythm regular; rate controlled; no murmurs, rubs or gallop  Abdomen: Soft, nontender, nondistended  Extremities:  peripheral pulses present; no peripheral edema; no ulcers  Musculoskeletal: No clubbing, cyanosis. limited ability to move lower extremities secondary to swelling.   Skin:  Lower extremities thickened red to brown skin, erythematous left lateral lower leg, weeping from bilateral lower extremities  Neurologic: awake, alert and following commands   Psychiatric: non-anxious affect, cooperative    ASSESSMENT    Acute on chronic HFpEF  Acute hypoxic respiratory failure  Cellulitis  AFib on Eliquis  SAM  Obesity hypoventilation syndrome  COPD  Uncontrolled Diabetes Mellitus type 2 with Hyperglycemia  Hypertension  Hyperlipidemia  Lymphedema    PLAN:    - Continue home medications for chronic stable medical conditions  - IV diuretics, echocardiogram.    - Will consult cardiology for evaluation- they recommend Bumex IV BID 2mg  - FOBT pending, drop in hgb currently 8.0, will repeat this afternoon at 1600 and transfuse if <7  - broad-spectrum antibiotic with a concern for lower extremity cellulitis, given comorbidities   -  infectious disease following  - Consult wound care  - blood glucose coverage  - endocrinology consult    DISPOSITION:   DVT prophylaxis: Eliquis  CODE STATUS: Patient is a full code  Discharge plan: Patient can be discharged next 3 to 4 days to home with and without home health.,  Pending clinical improvement, pending work-up and clearance by consulting services.     Medications:  REVIEWED DAILY    Infusion Medications    dextrose      sodium chloride       Scheduled Medications    ezetimibe  10 mg Oral Nightly    apixaban  5 mg Oral BID    dilTIAZem  240 mg Oral Daily    escitalopram  20 mg Oral Daily    gabapentin  300 mg Oral TID    pantoprazole  40 mg Oral QAM AC    metoprolol succinate  100 mg Oral Daily    traZODone  50 mg Oral Nightly    piperacillin-tazobactam  3,375 mg Intravenous Q8H    montelukast  10 mg Oral Nightly    omega-3 acid ethyl esters  2 g Oral BID    spironolactone  25 mg Oral Daily    [START ON 4/12/2021] vitamin D  50,000 Units Oral Weekly    ferrous sulfate  325 mg Oral Daily with breakfast    ipratropium-albuterol  1 ampule Inhalation Q4H WA    insulin glargine  50 Units Subcutaneous BID    insulin lispro  0-18 Units Subcutaneous TID WC    insulin lispro  0-9 Units Subcutaneous Nightly    bumetanide  2 mg Intravenous BID    linezolid  600 mg Oral 2 times per day    terbinafine  250 mg Oral Daily    sodium chloride flush  5-40 mL Intravenous 2 times per day     PRN Meds: ipratropium-albuterol, miconazole, white petrolatum, glucose, dextrose, glucagon (rDNA), dextrose, traMADol, sodium chloride flush, sodium chloride, promethazine **OR** ondansetron, polyethylene glycol, acetaminophen **OR** acetaminophen    Labs:     Recent Labs 04/09/21 1943   WBC 7.6   HGB 8.6*   HCT 29.7*          Recent Labs     04/09/21 1943 04/11/21  0423    137   K 5.2* 4.4    96*   CO2 29 35*   BUN 21 21   CREATININE 0.8 1.0   CALCIUM 8.9 8.5*       Recent Labs     04/09/21 1943   PROT 6.1*   ALKPHOS 90   ALT 7   AST 8   BILITOT <0.2       No results for input(s): INR in the last 72 hours.     Recent Labs     04/09/21 1943   TROPONINI <0.01       Chronic labs:    Lab Results   Component Value Date    CHOL 355 (H) 01/16/2021    TRIG 298 (H) 01/16/2021    HDL 52 01/16/2021    LDLCALC 243 (H) 01/16/2021    TSH 0.575 01/15/2021    INR 1.1 01/12/2021    LABA1C 10.2 (H) 01/13/2021       Radiology: REVIEWED DAILY    +++++++++++++++++++++++++++++++++++++++++++++++++  [id number] [facility]  +++++++++++++++++++++++++++++++++++++++++++++++++

## 2021-04-11 NOTE — PROGRESS NOTES
[address] [place] [address] [facility]  DARLYNIDA  Progress Note    CC: leg pain / cellulitis   Face to face encounter   SUBJECTIVE:  Patient is tolerating medications. No reported adverse drug reactions. ROS: No nausea, vomiting, diarrhea. No fever.  Tenderness to lower extremities   Sitting up on bedside commode     Medications:  Scheduled Meds:   ezetimibe  10 mg Oral Nightly    insulin lispro  40 Units Subcutaneous TID WC    levoFLOXacin  500 mg Oral Daily    doxycycline hyclate  100 mg Oral 2 times per day    apixaban  5 mg Oral BID    dilTIAZem  240 mg Oral Daily    escitalopram  20 mg Oral Daily    gabapentin  300 mg Oral TID    pantoprazole  40 mg Oral QAM AC    metoprolol succinate  100 mg Oral Daily    traZODone  50 mg Oral Nightly    montelukast  10 mg Oral Nightly    omega-3 acid ethyl esters  2 g Oral BID    spironolactone  25 mg Oral Daily    [START ON 4/12/2021] vitamin D  50,000 Units Oral Weekly    ferrous sulfate  325 mg Oral Daily with breakfast    ipratropium-albuterol  1 ampule Inhalation Q4H WA    insulin glargine  50 Units Subcutaneous BID    insulin lispro  0-18 Units Subcutaneous TID     insulin lispro  0-9 Units Subcutaneous Nightly    bumetanide  2 mg Intravenous BID    terbinafine  250 mg Oral Daily    sodium chloride flush  5-40 mL Intravenous 2 times per day     Continuous Infusions:   dextrose      sodium chloride       PRN Meds:ipratropium-albuterol, miconazole, white petrolatum, glucose, dextrose, glucagon (rDNA), dextrose, traMADol, sodium chloride flush, sodium chloride, promethazine **OR** ondansetron, polyethylene glycol, acetaminophen **OR** acetaminophen  OBJECTIVE:  Patient Vitals for the past 24 hrs:   BP Temp Temp src Pulse Resp SpO2 Height Weight   04/11/21 1156 -- -- -- -- -- -- 5' 3\" (1.6 m) --   04/11/21 0759 -- -- -- -- -- 94 % -- --   04/11/21 0756 (!) 147/88 97.6 °F (36.4 °C) Oral 100 22 94 % -- --   04/11/21 0549 (!) 110/56 97.5 °F (36.4 °C) Oral 90 22 97 % -- --   04/11/21 0230 -- -- -- -- -- -- -- (!) 307 lb 9.6 oz (139.5 kg)   04/11/21 0136 -- -- -- -- 23 94 % -- --   04/10/21 2245 (!) 140/62 97 °F (36.1 °C) Temporal 96 23 94 % -- --   04/10/21 2030 128/61 97.3 °F (36.3 °C) Temporal 98 22 99 % -- --   04/10/21 1751 -- -- -- -- -- 98 % -- --     Constitutional: The patient is awake, alert, and oriented. Up on bedside commode   Skin: Warm and dry. No rashes were noted. Head: Eyes show round, and reactive pupils. No jaundice. Mouth: Moist mucous membranes, no ulcerations, no thrush. Neck: Supple to movements. No lymphadenopathy. Chest: No use of accessory muscles to breathe. Symmetrical expansion. Auscultation reveals no wheezing, crackles, or rhonchi. Cardiovascular: S1 and S2 are rhythmic and regular. No murmurs appreciated. Abdomen: Positive bowel sounds to auscultation. Benign to palpation. Large  Extremities: ++lymphedema. ++ edema.  Bilateral legs wrapped- left leg with more erythema and tenderness than right   PIV            Laboratory and Tests Review:  Lab Results   Component Value Date    WBC 6.6 04/11/2021    WBC 7.6 04/09/2021    WBC 8.1 01/17/2021    HGB 8.0 (L) 04/11/2021    HCT 28.0 (L) 04/11/2021    MCV 83.6 04/11/2021     04/11/2021     Lab Results   Component Value Date    NEUTROABS 4.77 04/11/2021    NEUTROABS 5.79 04/09/2021    NEUTROABS 6.57 01/17/2021     Lab Results   Component Value Date    CRP 14.0 (H) 05/29/2020    CRP 2.2 (H) 12/24/2019    CRP 2.0 (H) 09/22/2019     Lab Results   Component Value Date    SEDRATE 75 (H) 05/29/2020    SEDRATE 41 (H) 12/24/2019    SEDRATE 38 (H) 09/22/2019     Lab Results   Component Value Date    ALT 7 04/09/2021    AST 8 04/09/2021    ALKPHOS 90 04/09/2021    BILITOT <0.2 04/09/2021     Lab Results   Component Value Date     04/11/2021    K 4.4 04/11/2021    K 5.2 04/09/2021    CL 96 04/11/2021    CO2 35 04/11/2021    BUN 21 04/11/2021    CREATININE 1.0 04/11/2021

## 2021-04-12 ENCOUNTER — APPOINTMENT (OUTPATIENT)
Dept: NUCLEAR MEDICINE | Age: 66
DRG: 383 | End: 2021-04-12
Payer: COMMERCIAL

## 2021-04-12 LAB
ANION GAP SERPL CALCULATED.3IONS-SCNC: 12 MMOL/L (ref 7–16)
BUN BLDV-MCNC: 19 MG/DL (ref 8–23)
CALCIUM SERPL-MCNC: 9.2 MG/DL (ref 8.6–10.2)
CHLORIDE BLD-SCNC: 87 MMOL/L (ref 98–107)
CO2: 36 MMOL/L (ref 22–29)
CREAT SERPL-MCNC: 0.9 MG/DL (ref 0.5–1)
EKG ATRIAL RATE: 87 BPM
EKG P AXIS: 85 DEGREES
EKG P-R INTERVAL: 134 MS
EKG Q-T INTERVAL: 392 MS
EKG QRS DURATION: 100 MS
EKG QTC CALCULATION (BAZETT): 471 MS
EKG R AXIS: 63 DEGREES
EKG T AXIS: 161 DEGREES
EKG VENTRICULAR RATE: 87 BPM
GFR AFRICAN AMERICAN: >60
GFR NON-AFRICAN AMERICAN: >60 ML/MIN/1.73
GLUCOSE BLD-MCNC: 160 MG/DL (ref 74–99)
METER GLUCOSE: 174 MG/DL (ref 74–99)
METER GLUCOSE: 188 MG/DL (ref 74–99)
METER GLUCOSE: 192 MG/DL (ref 74–99)
METER GLUCOSE: 209 MG/DL (ref 74–99)
POTASSIUM SERPL-SCNC: 4 MMOL/L (ref 3.5–5)
SODIUM BLD-SCNC: 135 MMOL/L (ref 132–146)

## 2021-04-12 PROCEDURE — 82024 ASSAY OF ACTH: CPT

## 2021-04-12 PROCEDURE — 2500000003 HC RX 250 WO HCPCS: Performed by: NURSE PRACTITIONER

## 2021-04-12 PROCEDURE — 82530 CORTISOL FREE: CPT

## 2021-04-12 PROCEDURE — 93005 ELECTROCARDIOGRAM TRACING: CPT | Performed by: CLINICAL NURSE SPECIALIST

## 2021-04-12 PROCEDURE — 83835 ASSAY OF METANEPHRINES: CPT

## 2021-04-12 PROCEDURE — 2500000003 HC RX 250 WO HCPCS: Performed by: INTERNAL MEDICINE

## 2021-04-12 PROCEDURE — 82962 GLUCOSE BLOOD TEST: CPT

## 2021-04-12 PROCEDURE — 94640 AIRWAY INHALATION TREATMENT: CPT

## 2021-04-12 PROCEDURE — 6370000000 HC RX 637 (ALT 250 FOR IP): Performed by: EMERGENCY MEDICINE

## 2021-04-12 PROCEDURE — 2700000000 HC OXYGEN THERAPY PER DAY

## 2021-04-12 PROCEDURE — 3430000000 HC RX DIAGNOSTIC RADIOPHARMACEUTICAL: Performed by: RADIOLOGY

## 2021-04-12 PROCEDURE — 2580000003 HC RX 258: Performed by: EMERGENCY MEDICINE

## 2021-04-12 PROCEDURE — 6370000000 HC RX 637 (ALT 250 FOR IP): Performed by: INTERNAL MEDICINE

## 2021-04-12 PROCEDURE — 2060000000 HC ICU INTERMEDIATE R&B

## 2021-04-12 PROCEDURE — 99232 SBSQ HOSP IP/OBS MODERATE 35: CPT | Performed by: INTERNAL MEDICINE

## 2021-04-12 PROCEDURE — A9503 TC99M MEDRONATE: HCPCS | Performed by: RADIOLOGY

## 2021-04-12 PROCEDURE — 78315 BONE IMAGING 3 PHASE: CPT | Performed by: RADIOLOGY

## 2021-04-12 PROCEDURE — 6370000000 HC RX 637 (ALT 250 FOR IP): Performed by: FAMILY MEDICINE

## 2021-04-12 PROCEDURE — 6370000000 HC RX 637 (ALT 250 FOR IP): Performed by: CLINICAL NURSE SPECIALIST

## 2021-04-12 PROCEDURE — 78315 BONE IMAGING 3 PHASE: CPT

## 2021-04-12 PROCEDURE — 93010 ELECTROCARDIOGRAM REPORT: CPT | Performed by: INTERNAL MEDICINE

## 2021-04-12 PROCEDURE — 99222 1ST HOSP IP/OBS MODERATE 55: CPT | Performed by: INTERNAL MEDICINE

## 2021-04-12 PROCEDURE — 80048 BASIC METABOLIC PNL TOTAL CA: CPT

## 2021-04-12 PROCEDURE — 36415 COLL VENOUS BLD VENIPUNCTURE: CPT

## 2021-04-12 PROCEDURE — APPSS30 APP SPLIT SHARED TIME 16-30 MINUTES: Performed by: PHYSICIAN ASSISTANT

## 2021-04-12 RX ORDER — BUMETANIDE 0.25 MG/ML
2 INJECTION, SOLUTION INTRAMUSCULAR; INTRAVENOUS 2 TIMES DAILY
Status: DISCONTINUED | OUTPATIENT
Start: 2021-04-12 | End: 2021-04-14

## 2021-04-12 RX ORDER — INSULIN GLARGINE 100 [IU]/ML
55 INJECTION, SOLUTION SUBCUTANEOUS 2 TIMES DAILY
Status: DISCONTINUED | OUTPATIENT
Start: 2021-04-12 | End: 2021-04-13

## 2021-04-12 RX ORDER — BUMETANIDE 1 MG/1
2 TABLET ORAL 2 TIMES DAILY
Status: DISCONTINUED | OUTPATIENT
Start: 2021-04-13 | End: 2021-04-12

## 2021-04-12 RX ORDER — TC 99M MEDRONATE 20 MG/10ML
25 INJECTION, POWDER, LYOPHILIZED, FOR SOLUTION INTRAVENOUS
Status: COMPLETED | OUTPATIENT
Start: 2021-04-12 | End: 2021-04-12

## 2021-04-12 RX ADMIN — INSULIN LISPRO 40 UNITS: 100 INJECTION, SOLUTION INTRAVENOUS; SUBCUTANEOUS at 17:25

## 2021-04-12 RX ADMIN — TRAMADOL HYDROCHLORIDE 50 MG: 50 TABLET, COATED ORAL at 15:14

## 2021-04-12 RX ADMIN — IPRATROPIUM BROMIDE AND ALBUTEROL SULFATE 1 AMPULE: .5; 3 SOLUTION RESPIRATORY (INHALATION) at 08:11

## 2021-04-12 RX ADMIN — ACETAMINOPHEN 650 MG: 325 TABLET ORAL at 06:39

## 2021-04-12 RX ADMIN — INSULIN GLARGINE 55 UNITS: 100 INJECTION, SOLUTION SUBCUTANEOUS at 23:10

## 2021-04-12 RX ADMIN — INSULIN LISPRO 40 UNITS: 100 INJECTION, SOLUTION INTRAVENOUS; SUBCUTANEOUS at 12:03

## 2021-04-12 RX ADMIN — INSULIN LISPRO 3 UNITS: 100 INJECTION, SOLUTION INTRAVENOUS; SUBCUTANEOUS at 17:24

## 2021-04-12 RX ADMIN — DOXYCYCLINE HYCLATE 100 MG: 100 CAPSULE ORAL at 08:52

## 2021-04-12 RX ADMIN — OMEGA-3-ACID ETHYL ESTERS 2 G: 1 CAPSULE, LIQUID FILLED ORAL at 20:51

## 2021-04-12 RX ADMIN — GABAPENTIN 300 MG: 300 CAPSULE ORAL at 20:50

## 2021-04-12 RX ADMIN — LEVOFLOXACIN 500 MG: 500 TABLET, FILM COATED ORAL at 08:52

## 2021-04-12 RX ADMIN — TRAMADOL HYDROCHLORIDE 50 MG: 50 TABLET, COATED ORAL at 06:40

## 2021-04-12 RX ADMIN — ACETAMINOPHEN 650 MG: 325 TABLET ORAL at 20:51

## 2021-04-12 RX ADMIN — DOXYCYCLINE HYCLATE 100 MG: 100 CAPSULE ORAL at 20:51

## 2021-04-12 RX ADMIN — PANTOPRAZOLE SODIUM 40 MG: 40 TABLET, DELAYED RELEASE ORAL at 06:40

## 2021-04-12 RX ADMIN — PETROLATUM 30 ML: 420 OINTMENT TOPICAL at 01:10

## 2021-04-12 RX ADMIN — BUMETANIDE 2 MG: 0.25 INJECTION INTRAMUSCULAR; INTRAVENOUS at 20:37

## 2021-04-12 RX ADMIN — APIXABAN 5 MG: 5 TABLET, FILM COATED ORAL at 20:52

## 2021-04-12 RX ADMIN — IPRATROPIUM BROMIDE AND ALBUTEROL SULFATE 1 AMPULE: .5; 3 SOLUTION RESPIRATORY (INHALATION) at 19:37

## 2021-04-12 RX ADMIN — TRAZODONE HYDROCHLORIDE 50 MG: 50 TABLET ORAL at 20:51

## 2021-04-12 RX ADMIN — BUMETANIDE 2 MG: 0.25 INJECTION INTRAMUSCULAR; INTRAVENOUS at 08:53

## 2021-04-12 RX ADMIN — METOPROLOL SUCCINATE 100 MG: 100 TABLET, EXTENDED RELEASE ORAL at 08:53

## 2021-04-12 RX ADMIN — APIXABAN 5 MG: 5 TABLET, FILM COATED ORAL at 08:53

## 2021-04-12 RX ADMIN — DILTIAZEM HYDROCHLORIDE 240 MG: 240 CAPSULE, EXTENDED RELEASE ORAL at 08:52

## 2021-04-12 RX ADMIN — INSULIN GLARGINE 55 UNITS: 100 INJECTION, SOLUTION SUBCUTANEOUS at 08:53

## 2021-04-12 RX ADMIN — MONTELUKAST SODIUM 10 MG: 10 TABLET ORAL at 20:51

## 2021-04-12 RX ADMIN — GABAPENTIN 300 MG: 300 CAPSULE ORAL at 15:13

## 2021-04-12 RX ADMIN — TRAMADOL HYDROCHLORIDE 50 MG: 50 TABLET, COATED ORAL at 20:38

## 2021-04-12 RX ADMIN — TC 99M MEDRONATE 25 MILLICURIE: 20 INJECTION, POWDER, LYOPHILIZED, FOR SOLUTION INTRAVENOUS at 10:32

## 2021-04-12 RX ADMIN — INSULIN LISPRO 3 UNITS: 100 INJECTION, SOLUTION INTRAVENOUS; SUBCUTANEOUS at 12:02

## 2021-04-12 RX ADMIN — EZETIMIBE 10 MG: 10 TABLET ORAL at 20:50

## 2021-04-12 RX ADMIN — Medication 10 ML: at 08:53

## 2021-04-12 ASSESSMENT — PAIN SCALES - GENERAL
PAINLEVEL_OUTOF10: 6
PAINLEVEL_OUTOF10: 5
PAINLEVEL_OUTOF10: 6
PAINLEVEL_OUTOF10: 5
PAINLEVEL_OUTOF10: 6

## 2021-04-12 ASSESSMENT — PAIN DESCRIPTION - PAIN TYPE: TYPE: CHRONIC PAIN

## 2021-04-12 NOTE — PROGRESS NOTES
[address] [place] [address] [facility]  DARLYNIDA  Progress Note    CC: leg pain / cellulitis   Face to face encounter   SUBJECTIVE:  Tolerating PO  Patient is tolerating medications. No reported adverse drug reactions. ROS: No nausea, vomiting, diarrhea. No fever.  Tenderness to lower extremities   Sitting up on bedside commode     Medications:  Scheduled Meds:   insulin glargine  55 Units Subcutaneous BID    [START ON 4/13/2021] bumetanide  2 mg Oral BID    ezetimibe  10 mg Oral Nightly    insulin lispro  40 Units Subcutaneous TID WC    levoFLOXacin  500 mg Oral Daily    doxycycline hyclate  100 mg Oral 2 times per day    insulin lispro  0-18 Units Subcutaneous 4x Daily AC & HS    apixaban  5 mg Oral BID    dilTIAZem  240 mg Oral Daily    escitalopram  20 mg Oral Daily    gabapentin  300 mg Oral TID    pantoprazole  40 mg Oral QAM AC    metoprolol succinate  100 mg Oral Daily    traZODone  50 mg Oral Nightly    montelukast  10 mg Oral Nightly    omega-3 acid ethyl esters  2 g Oral BID    spironolactone  25 mg Oral Daily    vitamin D  50,000 Units Oral Weekly    ferrous sulfate  325 mg Oral Daily with breakfast    ipratropium-albuterol  1 ampule Inhalation Q4H WA    bumetanide  2 mg Intravenous BID    terbinafine  250 mg Oral Daily    sodium chloride flush  5-40 mL Intravenous 2 times per day     Continuous Infusions:   dextrose      sodium chloride       PRN Meds:ipratropium-albuterol, miconazole, white petrolatum, glucose, dextrose, glucagon (rDNA), dextrose, traMADol, sodium chloride flush, sodium chloride, promethazine **OR** ondansetron, polyethylene glycol, acetaminophen **OR** acetaminophen  OBJECTIVE:  Patient Vitals for the past 24 hrs:   BP Temp Temp src Pulse Resp SpO2   04/12/21 1518 (!) 143/75 97.9 °F (36.6 °C) Temporal 86 18 96 %   04/12/21 0829 133/68 97.9 °F (36.6 °C) Oral 86 18 95 %   04/12/21 0811 -- -- -- -- 18 95 %   04/11/21 2041 134/89 97.4 °F (36.3 °C) Oral 88 24 94 %     Constitutional: The patient is awake, alert, and oriented. Up on bedside commode   Skin: Warm and dry. No rashes were noted. Head: Eyes show round, and reactive pupils. No jaundice. Mouth: Moist mucous membranes, no ulcerations, no thrush. Neck: Supple to movements. No lymphadenopathy. Chest: No use of accessory muscles to breathe. Symmetrical expansion. Auscultation reveals no wheezing, crackles, or rhonchi. Cardiovascular: S1 and S2 are rhythmic and regular. No murmurs appreciated. Abdomen: Positive bowel sounds to auscultation. Benign to palpation. Large  Extremities: ++lymphedema. ++ edema.  Bilateral legs wrapped- left leg with more erythema and tenderness than right   PIV            Laboratory and Tests Review:  Lab Results   Component Value Date    WBC 6.6 04/11/2021    WBC 7.6 04/09/2021    WBC 8.1 01/17/2021    HGB 9.4 (L) 04/11/2021    HCT 32.4 (L) 04/11/2021    MCV 83.6 04/11/2021     04/11/2021     Lab Results   Component Value Date    NEUTROABS 4.77 04/11/2021    NEUTROABS 5.79 04/09/2021    NEUTROABS 6.57 01/17/2021     Lab Results   Component Value Date    CRP 14.0 (H) 05/29/2020    CRP 2.2 (H) 12/24/2019    CRP 2.0 (H) 09/22/2019     Lab Results   Component Value Date    SEDRATE 75 (H) 05/29/2020    SEDRATE 41 (H) 12/24/2019    SEDRATE 38 (H) 09/22/2019     Lab Results   Component Value Date    ALT 7 04/09/2021    AST 8 04/09/2021    ALKPHOS 90 04/09/2021    BILITOT <0.2 04/09/2021     Lab Results   Component Value Date     04/12/2021    K 4.0 04/12/2021    K 5.2 04/09/2021    CL 87 04/12/2021    CO2 36 04/12/2021    BUN 19 04/12/2021    CREATININE 0.9 04/12/2021    GFRAA >60 04/12/2021    LABGLOM >60 04/12/2021    GLUCOSE 160 04/12/2021    GLUCOSE 181 12/16/2011    PROT 6.1 04/09/2021    LABALBU 3.1 04/09/2021    LABALBU 4.5 11/02/2011    CALCIUM 9.2 04/12/2021    BILITOT <0.2 04/09/2021    ALKPHOS 90 04/09/2021    AST 8 04/09/2021    ALT 7 04/09/2021

## 2021-04-12 NOTE — PROGRESS NOTES
Cardiology notified of patient's changes in EKG this morning.  Electronically signed by Tk Olmedo RN on 4/12/2021 at 9:04 AM

## 2021-04-12 NOTE — PROGRESS NOTES
Message left with send out lab department regarding cortisol level.  Electronically signed by Tracey Rodríguez RN on 4/12/2021 at 9:06 AM

## 2021-04-12 NOTE — CARE COORDINATION
Patient from home, she lives with her spouse. She was active with "[employer] prior to admission and would like them to continue at discharge. They are following and will need resume orders prior to discharge. Patient uses a rollator and also has oxygen 4 liter and nebulizer at home through [employer]. For bone scan today. Plan is home with spouse when released. May require transport through [facility] at discharge. For questions I can be reached at [phone]. Lisandro Hernandez, [place]    The Plan for Transition of Care is related to the following treatment goals: discharge planning when stable     The Patient and/or patient representative Kalyani Otero was provided with a choice of provider and agrees   with the discharge plan. [x] Yes [] No    Freedom of choice list was provided with basic dialogue that supports the patient's individualized plan of care/goals, treatment preferences and shares the quality data associated with the providers.  [x] Yes [] No

## 2021-04-12 NOTE — PROGRESS NOTES
Hospitalist Progress Note      SYNOPSIS: Patient admitted on 2021 for       SUBJECTIVE:    Patient seen and examined in her room. Appears comfortable. Complaining of some pain in her tailbone area, being in bed for a while. No major overnight events. Denies any chest pain, shortness of breath, nausea, vomiting. Records reviewed. Stable overnight. No other overnight issues reported. Temp (24hrs), Av.7 °F (36.5 °C), Min:97.4 °F (36.3 °C), Max:97.9 °F (36.6 °C)    DIET: Diet NPO Effective Now  CODE: Full Code    Intake/Output Summary (Last 24 hours) at 2021 0914  Last data filed at 2021 0532  Gross per 24 hour   Intake 1000 ml   Output 2600 ml   Net -1600 ml       OBJECTIVE:    /68   Pulse 86   Temp 97.9 °F (36.6 °C) (Oral)   Resp 18   Ht 5' 3\" (1.6 m)   Wt (!) 307 lb 9.6 oz (139.5 kg)   LMP  (LMP Unknown)   SpO2 95%   BMI 54.49 kg/m²     General appearance: No apparent distress, appears stated age and cooperative. HEENT:  Conjunctivae/corneas clear. Neck: Supple. No jugular venous distention. Respiratory: Clear to auscultation bilaterally, normal respiratory effort. Bilateral basal crackles noted. Cardiovascular: Regular rate rhythm, normal S1-S2  Abdomen: Soft, nontender, nondistended  Musculoskeletal: No clubbing, cyanosis, no bilateral lower extremity edema. Brisk capillary refill. Skin:  Changes of bilateral lower extremities noted. Neurologic: awake, alert and following commands     ASSESSMENT & PLAN:    Hospital Course: 49-year-old female patient presents to emergency department for increasing shortness of breath over the last few days. [facility] was admitted with 2 chief problems of acute exacerbation of chronic heart failure and cellulitis of bilateral lower extremities.     Acute on chronic HFpEF  With acute hypoxemic respiratory failure requiring more than baseline oxygen  Currently on Bumex milligram IV twice daily as per cardiology  [facility] accurate I's and O's  Daily weight  Diuresis as per cardiology    Cellulitis  Cellulitis of bilateral lower extremity  Continue doxycycline    A. fib  Rate well controlled  Continue Eliquis for anticoagulation    COPD  No acute exacerbation suspected  Continue home dose regimen    Uncontrolled diabetes mellitus type 2  Continue current regimen    Hypertension  Blood pressure well controlled  Continue current medications    Anemia  Appears chronic and stable    Hyperlipidemia  Continue statin    DISPOSITION:   No clear discharge disposition at the moment. Patient would not like to go to rehab group.     Medications:  REVIEWED DAILY    Infusion Medications    dextrose      sodium chloride       Scheduled Medications    insulin glargine  55 Units Subcutaneous BID    ezetimibe  10 mg Oral Nightly    insulin lispro  40 Units Subcutaneous TID WC    levoFLOXacin  500 mg Oral Daily    doxycycline hyclate  100 mg Oral 2 times per day    insulin lispro  0-18 Units Subcutaneous 4x Daily AC & HS    apixaban  5 mg Oral BID    dilTIAZem  240 mg Oral Daily    escitalopram  20 mg Oral Daily    gabapentin  300 mg Oral TID    pantoprazole  40 mg Oral QAM AC    metoprolol succinate  100 mg Oral Daily    traZODone  50 mg Oral Nightly    montelukast  10 mg Oral Nightly    omega-3 acid ethyl esters  2 g Oral BID    spironolactone  25 mg Oral Daily    vitamin D  50,000 Units Oral Weekly    ferrous sulfate  325 mg Oral Daily with breakfast    ipratropium-albuterol  1 ampule Inhalation Q4H WA    bumetanide  2 mg Intravenous BID    terbinafine  250 mg Oral Daily    sodium chloride flush  5-40 mL Intravenous 2 times per day     PRN Meds: ipratropium-albuterol, miconazole, white petrolatum, glucose, dextrose, glucagon (rDNA), dextrose, traMADol, sodium chloride flush, sodium chloride, promethazine **OR** ondansetron, polyethylene glycol, acetaminophen **OR** acetaminophen    Labs:     Recent Labs     04/09/21 1943 04/11/21 0421 04/11/21  1535   WBC 7.6 6.6  --    HGB 8.6* 8.0* 9.4*   HCT 29.7* 28.0* 32.4*    315  --        Recent Labs     04/09/21 1943 04/11/21 0423    137   K 5.2* 4.4    96*   CO2 29 35*   BUN 21 21   CREATININE 0.8 1.0   CALCIUM 8.9 8.5*       Recent Labs     04/09/21 1943   PROT 6.1*   ALKPHOS 90   ALT 7   AST 8   BILITOT <0.2       No results for input(s): INR in the last 72 hours. Recent Labs     04/09/21 1943   TROPONINI <0.01       Chronic labs:    Lab Results   Component Value Date    CHOL 355 (H) 01/16/2021    TRIG 298 (H) 01/16/2021    HDL 52 01/16/2021    LDLCALC 243 (H) 01/16/2021    TSH 0.575 01/15/2021    INR 1.1 01/12/2021    LABA1C 10.4 (H) 04/11/2021       Radiology: REVIEWED DAILY    +++++++++++++++++++++++++++++++++++++++++++++++++  Sarthak, [address], [place]  +++++++++++++++++++++++++++++++++++++++++++++++++  NOTE: This report was transcribed using voice recognition software. Every effort was made to ensure accuracy; however, inadvertent computerized transcription errors may be present.

## 2021-04-12 NOTE — PROGRESS NOTES
24 hour urine bottle obtained from the lab. I spoke to the lab and supply about the order for the cortisol saliva test. Neither department had heard of it and neither know what test kit is needed or if we have it.  I was told we can call the lab after 0700 when personnel who are familiar with the send out tests are in to see if anyone can help with the requirements of the test.

## 2021-04-12 NOTE — PROGRESS NOTES
Physical Therapy    PT consult to evaluate/treat received and appreciated. Pt chart reviewed and evaluation attempted. Pt is currently off unit at time of attempt. Will check back as able. Thank you. Second attempt made in PM, pt was still of unit. Will check back tomorrow.       Hanna Campo, PT, DPT   [id number]

## 2021-04-12 NOTE — PROGRESS NOTES
Inpatient Cardiology Progress note     PATIENT IS BEING FOLLOWED FOR: HFpEF      Dean Green is a 77 y.o. female who follows with Dr Taniya Miller: denies chest pain - states her breathing is improved   OBJECTIVE: No apparent distress, resting flat in bed     ROS:  Consist: Denies fevers, chills or night sweats  Heart: Denies chest pain, palpitations, lightheadedness, dizziness or syncope  Lungs: Denies SOB, cough, wheezing, orthopnea or PND  GI: Denies abdominal pain, vomiting or diarrhea    PHYSICAL EXAM:   /68   Pulse 86   Temp 97.9 °F (36.6 °C) (Oral)   Resp 18   Ht 5' 3\" (1.6 m)   Wt (!) 307 lb 9.6 oz (139.5 kg)   LMP  (LMP Unknown)   SpO2 95%   BMI 54.49 kg/m²      CONST:  Well developed, obese  female who appears her stated age. Awake, alert, cooperative, no apparent distress  HEENT:   Head- Normocephalic, atraumatic   Eyes- Conjunctivae pink, anicteric  Throat- Oral mucosa pink and moist  Neck-  No stridor, trachea midline, no jugular venous distention. CHEST: Chest symmetrical and non-tender to palpation. RESPIRATORY: diminished breath sounds, no wheeze or rhonchi noted   CARDIOVASCULAR:     No carotid bruit noted bilaterally   Heart Ausculation- Regular rate and rhythm, +systolic murmur. PV: 2+ lower extremity edema. With erythema and chronic skin changes   ABDOMEN: Soft, non-tender to light palpation. Bowel sounds present. MS: Good muscle strength and tone. No atrophy or abnormal movements. : Deferred  SKIN: Warm and dry; erythema   NEURO / PSYCH: Oriented to person, place and time. Speech clear and appropriate. Follows all commands.  Pleasant affect       Intake/Output Summary (Last 24 hours) at 4/12/2021 1500  Last data filed at 4/12/2021 1454  Gross per 24 hour   Intake 300 ml   Output 3550 ml   Net -3250 ml       Weight:   Wt Readings from Last 3 Encounters:   04/11/21 (!) 307 lb 9.6 oz (139.5 kg)   01/19/21 260 lb 6 oz (118.1 kg)   11/22/20 282 lb (127.9 kg)     Current Inpatient Medications:   insulin glargine  55 Units Subcutaneous BID    ezetimibe  10 mg Oral Nightly    insulin lispro  40 Units Subcutaneous TID WC    levoFLOXacin  500 mg Oral Daily    doxycycline hyclate  100 mg Oral 2 times per day    insulin lispro  0-18 Units Subcutaneous 4x Daily AC & HS    apixaban  5 mg Oral BID    dilTIAZem  240 mg Oral Daily    escitalopram  20 mg Oral Daily    gabapentin  300 mg Oral TID    pantoprazole  40 mg Oral QAM AC    metoprolol succinate  100 mg Oral Daily    traZODone  50 mg Oral Nightly    montelukast  10 mg Oral Nightly    omega-3 acid ethyl esters  2 g Oral BID    spironolactone  25 mg Oral Daily    vitamin D  50,000 Units Oral Weekly    ferrous sulfate  325 mg Oral Daily with breakfast    ipratropium-albuterol  1 ampule Inhalation Q4H WA    bumetanide  2 mg Intravenous BID    terbinafine  250 mg Oral Daily    sodium chloride flush  5-40 mL Intravenous 2 times per day       IV Infusions (if any):   dextrose      sodium chloride         DIAGNOSTIC/ LABORATORY DATA:  Labs:   CBC:   Recent Labs     04/09/21 1943 04/11/21 0421 04/11/21  1535   WBC 7.6 6.6  --    HGB 8.6* 8.0* 9.4*   HCT 29.7* 28.0* 32.4*    315  --      BMP:   Recent Labs     04/09/21 1943 04/11/21 0423    137   K 5.2* 4.4   CO2 29 35*   BUN 21 21   CREATININE 0.8 1.0   LABGLOM >60 55   CALCIUM 8.9 8.5*     HgA1c:   Lab Results   Component Value Date    LABA1C 10.4 (H) 04/11/2021     CARDIAC ENZYMES:  Recent Labs     04/09/21 1943   TROPONINI <0.01     FASTING LIPID PANEL:  Lab Results   Component Value Date    CHOL 355 01/16/2021    HDL 52 01/16/2021    LDLCALC 243 01/16/2021    TRIG 298 01/16/2021     LIVER PROFILE:  Recent Labs     04/09/21 1943   AST 8   ALT 7   LABALBU 3.1*       CXR:     Telemetry:  12 lead EKG:    TTE 1/16/2018, Dr. Zachariah Urrutia concentric left ventricular hypertrophy. EF visually estimated at 55-65%.  Stage II diastolic dysfunction. The left atrium is severely dilated. Elevated L atrial pressure. Mild mitral regurgitation. The aortic valve appears mildly sclerotic. Pulmonary hypertension is mild to moderate. No evidence of pericardial effusion.      Adenosine Stress test 8/2008: No stress induced ischemia, EF 54%      Assessment:  1. Acute on chronic heart failure with preserved ejection fraction.  proBNP 295, IV diuresing.  Net -7.1 L  2. Paroxysmal atrial fibrillation, maintaining sinus  3. Chronic anticoagulation with Eliquis   4. Chronic lymphedema with cellulitis -- on antibiotic, ID following. Bone scan ordered for today   5. Acute on chronic hypoxic aspiratory failure/COPD, on 4 L oxygen baseline  6. History of PFO  7. Untreated sleep apnea  8. Morbid obesity, BMI 47.3 kg/m²  9. Hyperkalemia potassium 5.2 -- 4.4 resolved. 10. Hypertension  11. Severe hyperlipidemia. , intolerant to statins  12. Type 2 diabetes, insulin requiring with neuropathy.  A1c 10.2%  13. History of TIA  14. Anemia Hgb 8.6 --> 9.4       Plan:  1. BMP today   2. Continue IV diuresis, likely switch to PO tomorrow   3. Continue anticoagulation with Eliquis   4. Continue home cardiac medications   5. Continue Ezetimibe, recommend PCSK9 inhibitor with statin intolerance   6. Aggressive risk factor modification including diet, exercise and weight loss discussed with the patient   7. Above discussed with Dr Alina Mayorga       Electronically signed by Magaly Cabral, [id number] on 4/12/2021 at 3:00 PM     I have personally seen and evaluated the patient. I personally obtained the history and performed the physical exam.  They are currently pain free. I personally reviewed all of the above labs and data. All of the above assessments and recommendations are from me. All of the above cardiac medical decisions are from me. She was sitting in bed eating dinner. She was comfortable and in no distress. She states that her breathing has improved.     ENT:

## 2021-04-13 LAB
24HR URINE VOLUME (ML): 4175 ML
ANION GAP SERPL CALCULATED.3IONS-SCNC: 7 MMOL/L (ref 7–16)
BUN BLDV-MCNC: 19 MG/DL (ref 8–23)
CALCIUM SERPL-MCNC: 9.1 MG/DL (ref 8.6–10.2)
CHLORIDE BLD-SCNC: 91 MMOL/L (ref 98–107)
CO2: 41 MMOL/L (ref 22–29)
CREAT SERPL-MCNC: 0.8 MG/DL (ref 0.5–1)
CREATININE 24 HOUR URINE: 1712 MG/24H (ref 720–1510)
GFR AFRICAN AMERICAN: >60
GFR NON-AFRICAN AMERICAN: >60 ML/MIN/1.73
GLUCOSE BLD-MCNC: 172 MG/DL (ref 74–99)
HCT VFR BLD CALC: 29.1 % (ref 34–48)
HEMOGLOBIN: 8.6 G/DL (ref 11.5–15.5)
Lab: 24 HOURS
MCH RBC QN AUTO: 24.4 PG (ref 26–35)
MCHC RBC AUTO-ENTMCNC: 29.6 % (ref 32–34.5)
MCV RBC AUTO: 82.4 FL (ref 80–99.9)
METER GLUCOSE: 171 MG/DL (ref 74–99)
METER GLUCOSE: 203 MG/DL (ref 74–99)
METER GLUCOSE: 85 MG/DL (ref 74–99)
PDW BLD-RTO: 15.4 FL (ref 11.5–15)
PLATELET # BLD: 315 E9/L (ref 130–450)
PMV BLD AUTO: 8.7 FL (ref 7–12)
POTASSIUM REFLEX MAGNESIUM: 3.9 MMOL/L (ref 3.5–5)
RBC # BLD: 3.53 E12/L (ref 3.5–5.5)
SODIUM BLD-SCNC: 139 MMOL/L (ref 132–146)
WBC # BLD: 6.1 E9/L (ref 4.5–11.5)

## 2021-04-13 PROCEDURE — 6370000000 HC RX 637 (ALT 250 FOR IP): Performed by: FAMILY MEDICINE

## 2021-04-13 PROCEDURE — 6370000000 HC RX 637 (ALT 250 FOR IP): Performed by: INTERNAL MEDICINE

## 2021-04-13 PROCEDURE — 82570 ASSAY OF URINE CREATININE: CPT

## 2021-04-13 PROCEDURE — 6370000000 HC RX 637 (ALT 250 FOR IP): Performed by: CLINICAL NURSE SPECIALIST

## 2021-04-13 PROCEDURE — 6370000000 HC RX 637 (ALT 250 FOR IP): Performed by: EMERGENCY MEDICINE

## 2021-04-13 PROCEDURE — 97161 PT EVAL LOW COMPLEX 20 MIN: CPT

## 2021-04-13 PROCEDURE — 94640 AIRWAY INHALATION TREATMENT: CPT

## 2021-04-13 PROCEDURE — 2060000000 HC ICU INTERMEDIATE R&B

## 2021-04-13 PROCEDURE — 80048 BASIC METABOLIC PNL TOTAL CA: CPT

## 2021-04-13 PROCEDURE — 2700000000 HC OXYGEN THERAPY PER DAY

## 2021-04-13 PROCEDURE — 97530 THERAPEUTIC ACTIVITIES: CPT

## 2021-04-13 PROCEDURE — 6370000000 HC RX 637 (ALT 250 FOR IP): Performed by: SPECIALIST

## 2021-04-13 PROCEDURE — 2500000003 HC RX 250 WO HCPCS: Performed by: INTERNAL MEDICINE

## 2021-04-13 PROCEDURE — 82962 GLUCOSE BLOOD TEST: CPT

## 2021-04-13 PROCEDURE — 2580000003 HC RX 258: Performed by: EMERGENCY MEDICINE

## 2021-04-13 PROCEDURE — 85027 COMPLETE CBC AUTOMATED: CPT

## 2021-04-13 PROCEDURE — 97535 SELF CARE MNGMENT TRAINING: CPT

## 2021-04-13 PROCEDURE — 99231 SBSQ HOSP IP/OBS SF/LOW 25: CPT | Performed by: INTERNAL MEDICINE

## 2021-04-13 PROCEDURE — 51702 INSERT TEMP BLADDER CATH: CPT

## 2021-04-13 PROCEDURE — 99232 SBSQ HOSP IP/OBS MODERATE 35: CPT | Performed by: INTERNAL MEDICINE

## 2021-04-13 PROCEDURE — 36415 COLL VENOUS BLD VENIPUNCTURE: CPT

## 2021-04-13 PROCEDURE — 97166 OT EVAL MOD COMPLEX 45 MIN: CPT

## 2021-04-13 RX ORDER — INSULIN GLARGINE 100 [IU]/ML
58 INJECTION, SOLUTION SUBCUTANEOUS 2 TIMES DAILY
Status: DISCONTINUED | OUTPATIENT
Start: 2021-04-13 | End: 2021-04-16

## 2021-04-13 RX ADMIN — TRAZODONE HYDROCHLORIDE 50 MG: 50 TABLET ORAL at 20:48

## 2021-04-13 RX ADMIN — IPRATROPIUM BROMIDE AND ALBUTEROL SULFATE 1 AMPULE: .5; 3 SOLUTION RESPIRATORY (INHALATION) at 07:56

## 2021-04-13 RX ADMIN — MONTELUKAST SODIUM 10 MG: 10 TABLET ORAL at 20:48

## 2021-04-13 RX ADMIN — APIXABAN 5 MG: 5 TABLET, FILM COATED ORAL at 20:48

## 2021-04-13 RX ADMIN — GABAPENTIN 300 MG: 300 CAPSULE ORAL at 09:23

## 2021-04-13 RX ADMIN — ESCITALOPRAM 20 MG: 10 TABLET, FILM COATED ORAL at 09:22

## 2021-04-13 RX ADMIN — IPRATROPIUM BROMIDE AND ALBUTEROL SULFATE 1 AMPULE: .5; 3 SOLUTION RESPIRATORY (INHALATION) at 11:35

## 2021-04-13 RX ADMIN — TRAMADOL HYDROCHLORIDE 50 MG: 50 TABLET, COATED ORAL at 03:14

## 2021-04-13 RX ADMIN — DOXYCYCLINE HYCLATE 100 MG: 100 CAPSULE ORAL at 20:48

## 2021-04-13 RX ADMIN — TRAMADOL HYDROCHLORIDE 50 MG: 50 TABLET, COATED ORAL at 20:48

## 2021-04-13 RX ADMIN — SPIRONOLACTONE 25 MG: 25 TABLET ORAL at 09:22

## 2021-04-13 RX ADMIN — INSULIN GLARGINE 55 UNITS: 100 INJECTION, SOLUTION SUBCUTANEOUS at 09:21

## 2021-04-13 RX ADMIN — TRAMADOL HYDROCHLORIDE 50 MG: 50 TABLET, COATED ORAL at 11:06

## 2021-04-13 RX ADMIN — APIXABAN 5 MG: 5 TABLET, FILM COATED ORAL at 09:23

## 2021-04-13 RX ADMIN — DOXYCYCLINE HYCLATE 100 MG: 100 CAPSULE ORAL at 09:23

## 2021-04-13 RX ADMIN — ACETAMINOPHEN 650 MG: 325 TABLET ORAL at 07:01

## 2021-04-13 RX ADMIN — BUMETANIDE 2 MG: 0.25 INJECTION INTRAMUSCULAR; INTRAVENOUS at 20:57

## 2021-04-13 RX ADMIN — EZETIMIBE 10 MG: 10 TABLET ORAL at 20:48

## 2021-04-13 RX ADMIN — PANTOPRAZOLE SODIUM 40 MG: 40 TABLET, DELAYED RELEASE ORAL at 06:36

## 2021-04-13 RX ADMIN — FERROUS SULFATE TAB 325 MG (65 MG ELEMENTAL FE) 325 MG: 325 (65 FE) TAB at 09:22

## 2021-04-13 RX ADMIN — BUMETANIDE 2 MG: 0.25 INJECTION INTRAMUSCULAR; INTRAVENOUS at 12:18

## 2021-04-13 RX ADMIN — INSULIN LISPRO 6 UNITS: 100 INJECTION, SOLUTION INTRAVENOUS; SUBCUTANEOUS at 11:27

## 2021-04-13 RX ADMIN — METOPROLOL SUCCINATE 100 MG: 100 TABLET, EXTENDED RELEASE ORAL at 09:47

## 2021-04-13 RX ADMIN — GABAPENTIN 300 MG: 300 CAPSULE ORAL at 14:59

## 2021-04-13 RX ADMIN — INSULIN LISPRO 3 UNITS: 100 INJECTION, SOLUTION INTRAVENOUS; SUBCUTANEOUS at 06:36

## 2021-04-13 RX ADMIN — IPRATROPIUM BROMIDE AND ALBUTEROL SULFATE 1 AMPULE: .5; 3 SOLUTION RESPIRATORY (INHALATION) at 16:34

## 2021-04-13 RX ADMIN — OMEGA-3-ACID ETHYL ESTERS 2 G: 1 CAPSULE, LIQUID FILLED ORAL at 09:22

## 2021-04-13 RX ADMIN — INSULIN LISPRO 40 UNITS: 100 INJECTION, SOLUTION INTRAVENOUS; SUBCUTANEOUS at 11:28

## 2021-04-13 RX ADMIN — INSULIN LISPRO 3 UNITS: 100 INJECTION, SOLUTION INTRAVENOUS; SUBCUTANEOUS at 20:51

## 2021-04-13 RX ADMIN — GABAPENTIN 300 MG: 300 CAPSULE ORAL at 20:48

## 2021-04-13 RX ADMIN — LEVOFLOXACIN 500 MG: 500 TABLET, FILM COATED ORAL at 09:22

## 2021-04-13 RX ADMIN — TERBINAFINE 250 MG: 250 TABLET ORAL at 09:22

## 2021-04-13 RX ADMIN — INSULIN LISPRO 40 UNITS: 100 INJECTION, SOLUTION INTRAVENOUS; SUBCUTANEOUS at 09:21

## 2021-04-13 RX ADMIN — OMEGA-3-ACID ETHYL ESTERS 2 G: 1 CAPSULE, LIQUID FILLED ORAL at 20:48

## 2021-04-13 RX ADMIN — ACETAMINOPHEN 650 MG: 325 TABLET ORAL at 15:03

## 2021-04-13 RX ADMIN — DILTIAZEM HYDROCHLORIDE 240 MG: 240 CAPSULE, EXTENDED RELEASE ORAL at 09:22

## 2021-04-13 RX ADMIN — INSULIN GLARGINE 58 UNITS: 100 INJECTION, SOLUTION SUBCUTANEOUS at 20:52

## 2021-04-13 RX ADMIN — Medication 10 ML: at 20:49

## 2021-04-13 RX ADMIN — IPRATROPIUM BROMIDE AND ALBUTEROL SULFATE 1 AMPULE: .5; 3 SOLUTION RESPIRATORY (INHALATION) at 20:00

## 2021-04-13 ASSESSMENT — PAIN SCALES - GENERAL
PAINLEVEL_OUTOF10: 7
PAINLEVEL_OUTOF10: 9
PAINLEVEL_OUTOF10: 4
PAINLEVEL_OUTOF10: 0
PAINLEVEL_OUTOF10: 5
PAINLEVEL_OUTOF10: 6
PAINLEVEL_OUTOF10: 6

## 2021-04-13 ASSESSMENT — PAIN DESCRIPTION - LOCATION: LOCATION: LEG

## 2021-04-13 NOTE — CARE COORDINATION
Spoke with patient, she is weaker than normal. We discussed [facility] and she is reluctantly agreeable, she tells me her spouse had a cva and cannot provide hands on assistance. Reviewed [facility] list and choiced for [facility], referral pending. VIKTORIA list left for back up choices. Also called and updated son, Lynn Skinner on plan. Attempted to speak with spouse, however, CVA caused significant dysphagia and he is not able to communicate on the phone. 1528 Patient called me back in and asked for [facility], referral pending. Will have them start auth if they can accept. For questions I can be reached at [phone]. Kaley Carver, [place]      The Plan for Transition of Care is related to the following treatment goals: discharge planning when stable     The Patient and/or patient representative Abelino Mcclain was provided with a choice of provider and agrees   with the discharge plan. [x] Yes [] No    Freedom of choice list was provided with basic dialogue that supports the patient's individualized plan of care/goals, treatment preferences and shares the quality data associated with the providers.  [x] Yes [] No

## 2021-04-13 NOTE — PROGRESS NOTES
04/12/21 1518 (!) 143/75 97.9 °F (36.6 °C) Temporal 86 18 96 %     Constitutional: The patient is awake, alert, and oriented. Up on bedside commode   Skin: Warm and dry. No rashes were noted. Head: Eyes show round, and reactive pupils. No jaundice. Mouth: Moist mucous membranes, no ulcerations, no thrush. Neck: Supple to movements. No lymphadenopathy. Chest: No use of accessory muscles to breathe. Symmetrical expansion. Auscultation reveals no wheezing, crackles, or rhonchi. Cardiovascular: S1 and S2 are rhythmic and regular. No murmurs appreciated. Abdomen: Positive bowel sounds to auscultation. Benign to palpation. Large  Extremities: ++lymphedema. ++ edema.  Bilateral legs  left leg with more erythema and tenderness than right improving  PIV            Laboratory and Tests Review:  Lab Results   Component Value Date    WBC 6.1 04/13/2021    WBC 6.6 04/11/2021    WBC 7.6 04/09/2021    HGB 8.6 (L) 04/13/2021    HCT 29.1 (L) 04/13/2021    MCV 82.4 04/13/2021     04/13/2021     Lab Results   Component Value Date    NEUTROABS 4.77 04/11/2021    NEUTROABS 5.79 04/09/2021    NEUTROABS 6.57 01/17/2021     Lab Results   Component Value Date    CRP 14.0 (H) 05/29/2020    CRP 2.2 (H) 12/24/2019    CRP 2.0 (H) 09/22/2019     Lab Results   Component Value Date    SEDRATE 75 (H) 05/29/2020    SEDRATE 41 (H) 12/24/2019    SEDRATE 38 (H) 09/22/2019     Lab Results   Component Value Date    ALT 7 04/09/2021    AST 8 04/09/2021    ALKPHOS 90 04/09/2021    BILITOT <0.2 04/09/2021     Lab Results   Component Value Date     04/13/2021    K 3.9 04/13/2021    CL 91 04/13/2021    CO2 41 04/13/2021    BUN 19 04/13/2021    CREATININE 0.8 04/13/2021    GFRAA >60 04/13/2021    LABGLOM >60 04/13/2021    GLUCOSE 172 04/13/2021    GLUCOSE 181 12/16/2011    PROT 6.1 04/09/2021    LABALBU 3.1 04/09/2021    LABALBU 4.5 11/02/2011    CALCIUM 9.1 04/13/2021    BILITOT <0.2 04/09/2021    ALKPHOS 90 04/09/2021    AST 8 04/09/2021    ALT 7 04/09/2021     Radiology:  Reviewed     Microbiology:   Blood cx- no growth to date     ASSESSMENT:  · Recurrent cellulitis with chronic lymphedema  · Onychomycosis/ tinea pedis   · Pain to coccyx area     PLAN:  · Change to Po levaquin and doxycycline for preparation for discharge  · Bone scan reviewed  · Continue lamisil   · Check EKG in am   · Monitor labs- reviewed   · Continue wound care     Greer Bis  3:17 PM  4/13/2021     Pt seen and examined. Above discussed agree with advanced practice nurse. Labs, cultures, and radiographs reviewed. Face to Face encounter occurred. Changes made as necessary.      Aki Villarreal MD

## 2021-04-13 NOTE — PROGRESS NOTES
Physical Therapy    Physical Therapy Initial Assessment     Name: Missy De La Cruz  : 1955  MRN: [id number]    Referring Provider:        Riaz Payan MD         Date of Service: 2021    Evaluating PT:  Ammon Barger, PT, DPT [id number]     Room #:  2231/4405-U  Diagnosis:  Cellulitis   PMHx/PSHx:  Arthritis, asthma, CHF, COPD, DM2, HLD, HTN, anemia, Afib, GERD, LVH, lymphadenitis, occipital neuralgia   Precautions:  Falls, O2  Equipment Needs:  NA    SUBJECTIVE:    Pt lives with her  and grandchildren in a 2 story home with ramp entry. Bedroom and bathroom are on the 1st level. Pt ambulated with Rollator PTA. Pt owns Rollator, W/C, BSC, and on 4 L home O2. OBJECTIVE:   Initial Evaluation  Date: 21 Treatment Short Term/ Long Term   Goals   AM-PAC 6 Clicks 53/43     Was pt agreeable to Eval/treatment? Yes     Does pt have pain? Reports pain in BLE     Bed Mobility  Rolling: SBA  Supine to sit: Min A  Sit to supine: NT  Scooting: SBA  Modified Independent     Transfers Sit to stand: Mod A  Stand to sit: Min A  Stand pivot: Min A with [facility]   Modified Independent     Ambulation    Few feet with [facility] Min A  >25 feet with [facility] Modified Independent     Stair negotiation: ascended and descended  NT  NA   ROM BUE:  Per OT  BLE:  WFL     Strength BUE:  Per OT  BLE:  Grossly 3+/5     Balance Sitting EOB:  SBA  Dynamic Standing:  Min A  Sitting EOB:  Independent   Dynamic Standing:  Modified Independent       Pt is A & O x 4  Sensation:  Pt denies numbness and tingling to extremities  Edema:  LE edema    Vitals:  HR 99 SpO2 93% on 4 L at EOB  HR 76 SpO2 92% on 4 L in chair     Therapeutic Exercises:     BLE ROM    Patient education  Pt educated on safety during functional mobility     Patient response to education:   Pt verbalized understanding Pt demonstrated skill Pt requires further education in this area   Yes  Yes  no     ASSESSMENT:    Comments:  Pt received supine and agreeable to PT evaluation. Vitals monitored during session. Pt requires lt assistance of LE and trunk during supine>sit. Requires assistance to achieve STS but fair balance noted during short distance ambulation bed>chair. Pt states she ambulates short distances at home. Declines further ambulation at this time. Pt left sitting in chair with call button in reach, lines attached, and needs met. Treatment:  Patient practiced and was instructed in the following treatment:     Bed mobility training - pt given verbal and tactile cues to facilitate proper sequencing and safety during rolling and supine>sit as well as provided with physical assistance to complete task     STS and pivot transfer training - pt educated on proper hand and foot placement, safety and sequencing, and use of WW to safely complete sit<>stand and pivot transfers with hands on assistance to complete task safely     Pt's/ family goals   1. Home     Patient and or family understand(s) diagnosis, prognosis, and plan of care. Yes     PLAN:    Current Treatment Recommendations     [x] Strengthening     [x] ROM   [x] Balance Training   [x] Endurance Training   [x] Transfer Training   [x] Gait Training   [] Stair Training   [] Positioning   [x] Safety and Education Training   [x] Patient/Caregiver Education   [x] HEP  [] Other     Frequency of treatments: 2-5x/week x 1-2 weeks. Time in  0848  Time out  0905    Total Treatment Time  8 minutes     Evaluation Time includes thorough review of current medical information, gathering information on past medical history/social history and prior level of function, completion of standardized testing/informal observation of tasks, assessment of data and education on plan of care and goals.     CPT codes:  [x] Low Complexity PT evaluation 28407  [] Moderate Complexity PT evaluation 35888  [] High Complexity PT evaluation 35526  [] PT Re-evaluation 39739  [] Gait training 51301 -- minutes  [] Manual therapy 18142 -- minutes  [x] Therapeutic activities 92456 8 minutes  [] Therapeutic exercises 83365 -- minutes  [] Neuromuscular reeducation 70835 -- minutes     Vera Cardona, PT, DPT  [id number]

## 2021-04-13 NOTE — PROGRESS NOTES
Occupational Therapy  OCCUPATIONAL THERAPY INITIAL EVALUATION        Date:2021  Patient Name: Francis Richardson  MRN: [id number]  : 1955  Room: [address] [place] [address]    Referring Physician:  Martine Kingsley MD    Evaluating OT:  JUDY Reyes, OTR/L #[id number]    AM-PAC Daily Activity Raw Score:  15/24  Recommended Adaptive Equipment:  TBD as pt progresses - Extended Tub Bench, Adaptive equipment    Reason for Admission:  Pt was admitted w/ SOB, Redness/swelling R LE    Diagnosis:  Cellulitis L LE, CHF, Erosion of Bone     Procedures this admission:  None     Pertinent Medical History:  Depression/Anxiety, Arthritis, Asthma, CHF, COPD, DM, Hx of Blood Clots, HTN, Lymphedema, L TKR, R LE Fx/surgical repair    Precautions:  Falls  R LE/swelling/limited knee ext  3L O2  Hansen Catheter    Home Living: Pt lives with her  and 2 adult grandsons in a 2-story house, stair-glide to 2nd floor. Sleeps in [address] on 1st floor, uses BSC and sponge bathes. Bathroom setup:  Walk-in-Shower, Standard-height Commode   Equipment owned:  W/C, Rollator, BSC, Home O2 2L, Lift Chair    Available Family Assist:  Family can provide 24/7 assist    Prior Level of Function:  Pt reported receiving assist w/ Hair Care, LB dressing, Toileting/Hygiene and Sponge bathing, Mod I for Transfers and Mobility using Rollator for household ambulation. W/C for outings - appts only.   Family members complete all IADLs  Driving:  No  Occupation:  None    Pain Level:  80 R LE at rest, increased to 7/10 w/ Light touch/movement;  Relief w/ Rest and Repositioning, Nsg Notified   Additional Complaints:  General fatigue/weakness    Vitals/Lab Values:  O2 sats remained > 92% w/ 3L O2     Cognition: A & O x 3 - cues for day/date   Able to Follow Multi-Step Commands w/ Min VCs for safety   Memory:  fair (+)   Sequencing:  fair (+)   Problem solving:  fair    Judgement/safety:  fair   Additional Comments:  Pt was pleasant, cooperative, Mod encouragement for sequencing, problem solving   Skilled positioning/alignment for Pain Mgmt, Skin Integrity, Edema Control, to maximize Pt's ability to [facility] interact w/ his/her environment, maximize respiratory status   Skilled monitoring of Vitals during session and pt's response to tx ax   Techs for improved Safety/Safety Awareness w/ Functional Activity/Mobility   Energy Conservation Techs/Pursed-Lip Breathing (PLB)     Recommendations for Continued Participation in OT services during Hospitalization and at D/C - SNF     Made all appropriate Environmental Modifications to facilitate pt's level of IND and safety. Pt and/or Family verbalized/demonstrated a Fair(+) understanding of education provided. Will Review PRN. Assessment of current deficits   Functional mobility [x]  ADLs [x] Strength [x]  Cognition [x]  Functional transfers  [x] IADLs [x] Safety Awareness [x]  Endurance [x]  Fine Motor Coordination [] Balance [x] Vision/perception [] Sensation []   Gross Motor Coordination [] ROM [x] Delirium []                  Motor Control []      Plan of Care: OT 1-3 x/week for 1-2 weeks PRN   [x] ADL retraining/AE, Equipment Needs/Recommendations   [x] Energy Conservation Techniques/Strategies      [x] Neuromuscular Re-Education      [x] Functional Transfer Training         [x] Functional Mobility Training          [x] Cognitive Re-Training          [x] Splinting/Positioning Needs           [x] Therapeutic Activity   [x]Therapeutic Exercise   [] Visual/Perceptual   [] Delirium Prevention/Treatment   [x] Positioning to Improve Functional [place], Safety, and Skin Integrity   [x] Patient and/or Family Education to Increase Safety and Functional [place]   [x] Environmental Modifications  [x] Compensatory techniques for ADLs   [x] Other:       Pt would benefit from continued skilled OT services to increase safety and independence with completion of ADL/IADL tasks for functional independence and quality of life. Pt/Family actively participated in the establishment of goals. Rehab Potential:  Fair(+) for established goals    Patient / Family Goal:  Decrease pain R LE - RN aware     Patient and/or Family were instructed on Functional Diagnosis, Prognosis/Goals and OT Plan of Care. Demonstrated Fair(+) understanding. Evaluation Time includes thorough review of current medical information, gathering information on past medical history/social history and prior level of function, completion of standardized testing/informal observation of tasks, assessment of data and education on plan of care and goals.      Eval Complexity: Mod  Profile and History - Mod  Assessment of Occupational Performance and Identification of Deficits - High  Clinical Decision Making - Mod      Time In:  1002              Time Out:  1040  Total Treatment Time:  30 minutes      Treatment Charges: Mins Units   OT Eval Low 25712     OT Eval Medium 97166 X 1   OT Eval High 63734     OT Re-Eval [id number]     Therapeutic Ex  85523     Therapeutic Activities 00889     ADL/Self Care 88264 30 2   Neuro Re-ed 11687     Orthotic manage/training  84484     Non-Billable Time     Total Timed Treatment 30 2       Giacomo Erickson [place], OTR/L  # [id number]

## 2021-04-13 NOTE — PROGRESS NOTES
Inpatient Cardiology Progress note     PATIENT IS BEING FOLLOWED FOR: HFpEF      Francis Richardson is a 77 y.o. female who follows with Dr Christine Neil: She was sitting in a chair at the side of the bed. She was comfortable and in no distress. She denies chest pain or shortness of breath.   OBJECTIVE: No apparent distress, resting flat in bed     ROS:  Consist: Denies fevers, chills or night sweats  Heart: Denies chest pain, palpitations, lightheadedness, dizziness or syncope  Lungs: Denies SOB, cough, wheezing, orthopnea or PND  GI: Denies abdominal pain, vomiting or diarrhea    PHYSICAL EXAM:   BP (!) 150/63   Pulse 95   Temp 97.8 °F (36.6 °C) (Temporal)   Resp 14   Ht 5' 3\" (1.6 m)   Wt (!) 307 lb 9.6 oz (139.5 kg)   LMP  (LMP Unknown)   SpO2 98%   BMI 54.49 kg/m²        Intake/Output Summary (Last 24 hours) at 4/13/2021 1021  Last data filed at 4/12/2021 1454  Gross per 24 hour   Intake 240 ml   Output 2450 ml   Net -2210 ml       Weight:   Wt Readings from Last 3 Encounters:   04/11/21 (!) 307 lb 9.6 oz (139.5 kg)   01/19/21 260 lb 6 oz (118.1 kg)   11/22/20 282 lb (127.9 kg)     Current Inpatient Medications:   insulin glargine  55 Units Subcutaneous BID    bumetanide  2 mg Intravenous BID    ezetimibe  10 mg Oral Nightly    insulin lispro  40 Units Subcutaneous TID WC    levoFLOXacin  500 mg Oral Daily    doxycycline hyclate  100 mg Oral 2 times per day    insulin lispro  0-18 Units Subcutaneous 4x Daily AC & HS    apixaban  5 mg Oral BID    dilTIAZem  240 mg Oral Daily    escitalopram  20 mg Oral Daily    gabapentin  300 mg Oral TID    pantoprazole  40 mg Oral QAM AC    metoprolol succinate  100 mg Oral Daily    traZODone  50 mg Oral Nightly    montelukast  10 mg Oral Nightly    omega-3 acid ethyl esters  2 g Oral BID    spironolactone  25 mg Oral Daily    vitamin D  50,000 Units Oral Weekly    ferrous sulfate  325 mg Oral Daily with breakfast    ipratropium-albuterol  1 ampule Inhalation Q4H WA    terbinafine  250 mg Oral Daily    sodium chloride flush  5-40 mL Intravenous 2 times per day       IV Infusions (if any):   dextrose      sodium chloride         DIAGNOSTIC/ LABORATORY DATA:  Labs:   CBC:   Recent Labs     04/11/21  0421 04/11/21  1535 04/13/21  0433   WBC 6.6  --  6.1   HGB 8.0* 9.4* 8.6*   HCT 28.0* 32.4* 29.1*     --  315     BMP:   Recent Labs     04/12/21  1540 04/13/21  0433    139   K 4.0 3.9   CO2 36* 41*   BUN 19 19   CREATININE 0.9 0.8   LABGLOM >60 >60   CALCIUM 9.2 9.1     HgA1c:   Lab Results   Component Value Date    LABA1C 10.4 (H) 04/11/2021     CARDIAC ENZYMES:  No results for input(s): CKTOTAL, CKMB, CKMBINDEX, TROPONINI in the last 72 hours. FASTING LIPID PANEL:  Lab Results   Component Value Date    CHOL 355 01/16/2021    HDL 52 01/16/2021    LDLCALC 243 01/16/2021    TRIG 298 01/16/2021     LIVER PROFILE:  No results for input(s): AST, ALT, LABALBU in the last 72 hours. CXR:     Telemetry:  12 lead EKG:    TTE 1/16/2018, Dr. Florez Setting concentric left ventricular hypertrophy. EF visually estimated at 55-65%. Stage II diastolic dysfunction. The left atrium is severely dilated. Elevated L atrial pressure. Mild mitral regurgitation. The aortic valve appears mildly sclerotic. Pulmonary hypertension is mild to moderate. No evidence of pericardial effusion.      Adenosine Stress test 8/2008: No stress induced ischemia, EF 54%      ENT: clear, no bleeding. No external masses. Lips normal formation. Neck: Very difficult to examine. Supple, full ROM, unable to evaluate for JVD, no bruits, no lymphadenopathy. No masses. trachea midline. Heart: regular rate & rhythm, normal S1 & S2, II/VI systolic murmur, S4 gallop. No heave. Lungs: Extremely poor air movement. .  No accessory muscles. Bibasilar rales. Abd: soft, non-tender. Normal bowel sounds. Morbidly obese. Neuro: Full ROM X 4, EOMI, no tremors.   EXT: Severe bilateral lymphedema bilateral lower extremity edema  Skin: warm, dry, intact. Good turgor. Assessment/recommendations:  1. Very difficult exam.  Her breathing is improving but she still has some rales. Continue diuresing and follow lites and creatinine. 2. Severe/oxygen requiring COPD. 3. Severe chronic lymphedema. 4. Cellulitis. Now being evaluated for osteomyelitis. I will defer the comorbidities to others. 5. Stage II diastolic dysfunction  6. Paroxysmal atrial fibrillation  7. Hypertension  8. LVH  9. PFO  10. Super morbid obesity  11. Pulmonary hypertension  12. Diabetes  13. Anemia  14. Untreated sleep apnea. As above, I will defer the comorbidities to others. Electronically signed by Ashley Santacruz DO on 4/13/2021 at 10:21 AM    Note: This report was completed using computerized voice recognition software. Every effort has been made to ensure accuracy, however; and invert and computerized transcription errors may be present.

## 2021-04-13 NOTE — PROGRESS NOTES
doxycycline    A. fib  Rate well controlled  Continue Eliquis for anticoagulation    COPD  No acute exacerbation suspected  Continue home dose regimen    Uncontrolled diabetes mellitus type 2  Continue current regimen  Endocrinology following    Hypertension  Blood pressure well controlled  Continue current medications    Anemia  Appears chronic and stable    Hyperlipidemia  Continue statin    DISPOSITION:   No clear discharge disposition at the moment. Patient would not like to go to rehab group.  Echo pending    Medications:  REVIEWED DAILY    Infusion Medications    dextrose      sodium chloride       Scheduled Medications    insulin glargine  55 Units Subcutaneous BID    bumetanide  2 mg Intravenous BID    ezetimibe  10 mg Oral Nightly    insulin lispro  40 Units Subcutaneous TID WC    levoFLOXacin  500 mg Oral Daily    doxycycline hyclate  100 mg Oral 2 times per day    insulin lispro  0-18 Units Subcutaneous 4x Daily AC & HS    apixaban  5 mg Oral BID    dilTIAZem  240 mg Oral Daily    escitalopram  20 mg Oral Daily    gabapentin  300 mg Oral TID    pantoprazole  40 mg Oral QAM AC    metoprolol succinate  100 mg Oral Daily    traZODone  50 mg Oral Nightly    montelukast  10 mg Oral Nightly    omega-3 acid ethyl esters  2 g Oral BID    spironolactone  25 mg Oral Daily    vitamin D  50,000 Units Oral Weekly    ferrous sulfate  325 mg Oral Daily with breakfast    ipratropium-albuterol  1 ampule Inhalation Q4H WA    terbinafine  250 mg Oral Daily    sodium chloride flush  5-40 mL Intravenous 2 times per day     PRN Meds: ipratropium-albuterol, miconazole, white petrolatum, glucose, dextrose, glucagon (rDNA), dextrose, traMADol, sodium chloride flush, sodium chloride, promethazine **OR** ondansetron, polyethylene glycol, acetaminophen **OR** acetaminophen    Labs:     Recent Labs     04/11/21  0421 04/11/21  1535 04/13/21  0433   WBC 6.6  --  6.1   HGB 8.0* 9.4* 8.6*   HCT 28.0* 32.4* 29.1*

## 2021-04-13 NOTE — PROGRESS NOTES
ENDOCRINOLOGY PROGRESS NOTE  Via Tele-Health Service      Date of admission: 4/9/2021  Date of service: 4/13/2021  Admitting physician: Keyana Garcia MD   Primary Care Physician: Aramis Kam  Consultant physician: Claudeen Chaco MD     Reason for the consultation:  Uncontrolled DM type 2     History of Present Illness:  Services were provided through a video synchronous discussion     Ramila Nobles is a 77 y.o. old female with PMH of DM type 2, lymphedema with chronic wounds, COPD,on Ox, HTN, Afib and other listed below admitted to [facility] on 4/9/2021 because of MADRID and weeping from BLE. Endocrine service was consulted for diabetes management.     Subjective  Seen this AM, no acute issue overnight, BG improving     Inpatient diet:   Carb Restricted diet     Point of care glucose monitoring (Independently reviewed)   Recent Labs     04/11/21  1153 04/11/21  1625 04/11/21  2059 04/12/21  0633 04/12/21  1158 04/12/21  1720 04/12/21  2306 04/13/21  1124   GLUMET 363* 344* 233* 174* 192* 188* 209* 203*     Scheduled Meds:   insulin glargine  55 Units Subcutaneous BID    bumetanide  2 mg Intravenous BID    ezetimibe  10 mg Oral Nightly    insulin lispro  40 Units Subcutaneous TID WC    levoFLOXacin  500 mg Oral Daily    doxycycline hyclate  100 mg Oral 2 times per day    insulin lispro  0-18 Units Subcutaneous 4x Daily AC & HS    apixaban  5 mg Oral BID    dilTIAZem  240 mg Oral Daily    escitalopram  20 mg Oral Daily    gabapentin  300 mg Oral TID    pantoprazole  40 mg Oral QAM AC    metoprolol succinate  100 mg Oral Daily    traZODone  50 mg Oral Nightly    montelukast  10 mg Oral Nightly    omega-3 acid ethyl esters  2 g Oral BID    spironolactone  25 mg Oral Daily    vitamin D  50,000 Units Oral Weekly    ferrous sulfate  325 mg Oral Daily with breakfast    ipratropium-albuterol  1 ampule Inhalation Q4H WA    terbinafine  250 mg Oral Daily    sodium chloride flush  5-40 mL Intravenous 2 times per day     PRN Meds:   ipratropium-albuterol, 1 vial, Q6H PRN  miconazole, , BID PRN  white petrolatum, 30 mL, TID PRN  glucose, 15 g, PRN  dextrose, 12.5 g, PRN  glucagon (rDNA), 1 mg, PRN  dextrose, 100 mL/hr, PRN  traMADol, 50 mg, Q6H PRN  sodium chloride flush, 5-40 mL, PRN  sodium chloride, 25 mL, PRN  promethazine, 12.5 mg, Q6H PRN    Or  ondansetron, 4 mg, Q6H PRN  polyethylene glycol, 17 g, Daily PRN  acetaminophen, 650 mg, Q6H PRN    Or  acetaminophen, 650 mg, Q6H PRN      Continuous Infusions:   dextrose      sodium chloride         Review of Systems  All systems reviewed. All negative except for symptoms mentioned in HPI     OBJECTIVE    BP (!) 150/63   Pulse 95   Temp 97.8 °F (36.6 °C) (Temporal)   Resp 16   Ht 5' 3\" (1.6 m)   Wt (!) 307 lb 9.6 oz (139.5 kg)   LMP  (LMP Unknown)   SpO2 98%   BMI 54.49 kg/m²     Intake/Output Summary (Last 24 hours) at 4/13/2021 1151  Last data filed at 4/12/2021 1454  Gross per 24 hour   Intake 240 ml   Output 2450 ml   Net -2210 ml     Physical examination:  Due to this being a TeleHealth encounter, evaluation of the following organ systems is limited: Vitals/Constitutional/EENT/Resp/CV/GI//MS/Neuro/Skin/Heme-Lymph-Imm. Modified physical exam through Telemedicine camera    General: Communicating well via camera   Neck: no obvious neck mass. No obvious neck deformity     CVS: no distress   Chest: no distress. Chest is moving with respiration    Extremities:  no visible tremor  Skin: + visible skin changes in LE bilateral   Musculoskeletal: no visible deformity  Neuro: Alert and oriented to person, place, and time. Psychiatric: Normal mood and affect.  Behavior is normal    Review of Laboratory Data:  I personally reviewed the following labs:   Recent Labs     04/11/21  0421 04/11/21  1535 04/13/21  0433   WBC 6.6  --  6.1   RBC 3.35*  --  3.53   HGB 8.0* 9.4* 8.6*   HCT 28.0* 32.4* 29.1*   MCV 83.6  --  82.4   MCH 23.9*  --  24.4*   MCHC 28.6*  -- 29.6*   RDW 15.4*  --  15.4*     --  315   MPV 9.6  --  8.7     Recent Labs     04/11/21  0423 04/12/21  1540 04/13/21  0433    135 139   K 4.4 4.0 3.9   CL 96* 87* 91*   CO2 35* 36* 41*   BUN 21 19 19   CREATININE 1.0 0.9 0.8   GLUCOSE 306* 160* 172*   CALCIUM 8.5* 9.2 9.1     Beta-Hydroxybutyrate   Date Value Ref Range Status   11/17/2020 1.73 (H) 0.02 - 0.27 mmol/L Final   05/29/2020 0.18 0.02 - 0.27 mmol/L Final   05/28/2020 0.04 0.02 - 0.27 mmol/L Final     Lab Results   Component Value Date    LABA1C 10.4 04/11/2021    LABA1C 10.2 01/13/2021    LABA1C 15.7 11/17/2020     Lab Results   Component Value Date/Time    TSH 0.575 01/15/2021 09:04 AM    T4FREE 1.05 01/15/2021 09:04 AM     Lab Results   Component Value Date    LABA1C 10.4 04/11/2021    GLUCOSE 172 04/13/2021    GLUCOSE 181 12/16/2011    MALBCR <30 05/16/2018    LABMICR <12.0 05/12/2017    LABCREA 11 05/26/2018     Lab Results   Component Value Date    TRIG 298 01/16/2021    HDL 52 01/16/2021    LDLCALC 243 01/16/2021    CHOL 355 01/16/2021       Blood culture   Lab Results   Component Value Date    BC 24 Hours no growth 04/09/2021    BC 5 Days no growth 08/14/2020       Radiology:  NM BONE SCAN 3 PHASE   Final Result   At the level the coccyx there is no convincing increased uptake. This   is most convincing on the lateral images. Please correlate clinically,   the lesion questioned on the previous CT interpretation may be below   the resolution of bone scan      CT PELVIS WO CONTRAST Additional Contrast? None   Final Result   Nonspecific soft tissue density anterior to the tip of the coccyx with   possible erosive changes involving the anterior cortex of the distal   coccygeal segment. This is best demonstrated on axial image 85. Nuclear   medicine bone scan may be helpful to further characterize if clinically   warranted.          XR CHEST PORTABLE   Final Result   Cardiomegaly with pulmonary vascular congestion and developing interstitial   pulmonary edema or pneumonia throughout right lung. Medical Records/Labs/Images review:   I personally reviewed and summarized previous records   All labs and imaging were reviewed independently     Chris Preston, a 77 y.o.-old female seen today for inpatient diabetes management     Diabetes Mellitus type 2  · Uncontrolled, A1c 15.7%  · Will change insulin regimen to    ? lantus 58 units BID   ? Humalog 35 units with meals   ? High dose sliding scale with meals and at nigth   · Continue glucose check with meals and at bedtime   · Will titrate insulin dose based on the blood glucose trend & insulin requirement     Rt adrenal mass  · CT adrenal  5/30/2020 --> high pre-contrast Hounsfield units (34) with late phase washout 49 Hounsfield units makings lesion undetermined. This lesion has increased size when comparison is made with previous study of April 2019. Increased size of the right adrenal gland lesion up to 3.4 x 3 cm, can be of can indicate a suspicious lesion. · Previous endocrine work up 5/2020 showed normal plasma metanephrines but indeterminate 1 mg DST   · Late night saliva cortisol and 24 hrs urine free cortisol and in process   · Pt on Spironolactone and for this reason we can't do Renin/Patel   · If work up was negative for Cushing's. Pt will need biopsy of this adrenal lesion     Recurrent Cellulitis LE   · Management per ID  · Discussed the importance of controlling DM in management of LE wounds     Afib/acute on chronic CHF  · Cardiology on board    The above issues were reviewed with the patient who understood and agreed with the plan. Thank you for allowing us to participate in the care of this patient. Please do not hesitate to contact us with any additional questions.      Kay Toth MD  Endocrinologist, [facility]   [address] [place] [id number]   Phone: [phone]  Fax: [phone]  -------------------------  An electronic signature was used to authenticate this note.  Jonatan Manzano MD on 4/13/2021 at 11:51 AM    This visit was performed as a virtual video visit using a synchronous, two-way, audio-video telehealth technology platform

## 2021-04-14 ENCOUNTER — TELEPHONE (OUTPATIENT)
Dept: CARDIOLOGY CLINIC | Age: 66
End: 2021-04-14

## 2021-04-14 LAB
BLOOD CULTURE, ROUTINE: NORMAL
LV EF: 63 %
LVEF MODALITY: NORMAL
METER GLUCOSE: 110 MG/DL (ref 74–99)
METER GLUCOSE: 161 MG/DL (ref 74–99)
METER GLUCOSE: 162 MG/DL (ref 74–99)
METER GLUCOSE: 188 MG/DL (ref 74–99)

## 2021-04-14 PROCEDURE — 6370000000 HC RX 637 (ALT 250 FOR IP): Performed by: EMERGENCY MEDICINE

## 2021-04-14 PROCEDURE — 2060000000 HC ICU INTERMEDIATE R&B

## 2021-04-14 PROCEDURE — 6370000000 HC RX 637 (ALT 250 FOR IP): Performed by: CLINICAL NURSE SPECIALIST

## 2021-04-14 PROCEDURE — 6370000000 HC RX 637 (ALT 250 FOR IP): Performed by: FAMILY MEDICINE

## 2021-04-14 PROCEDURE — 6370000000 HC RX 637 (ALT 250 FOR IP): Performed by: INTERNAL MEDICINE

## 2021-04-14 PROCEDURE — 82962 GLUCOSE BLOOD TEST: CPT

## 2021-04-14 PROCEDURE — 51702 INSERT TEMP BLADDER CATH: CPT

## 2021-04-14 PROCEDURE — 97535 SELF CARE MNGMENT TRAINING: CPT

## 2021-04-14 PROCEDURE — 2500000003 HC RX 250 WO HCPCS: Performed by: INTERNAL MEDICINE

## 2021-04-14 PROCEDURE — 2500000003 HC RX 250 WO HCPCS: Performed by: FAMILY MEDICINE

## 2021-04-14 PROCEDURE — 97530 THERAPEUTIC ACTIVITIES: CPT

## 2021-04-14 PROCEDURE — 94640 AIRWAY INHALATION TREATMENT: CPT

## 2021-04-14 PROCEDURE — 2580000003 HC RX 258: Performed by: EMERGENCY MEDICINE

## 2021-04-14 PROCEDURE — 6370000000 HC RX 637 (ALT 250 FOR IP): Performed by: SPECIALIST

## 2021-04-14 PROCEDURE — 99232 SBSQ HOSP IP/OBS MODERATE 35: CPT | Performed by: INTERNAL MEDICINE

## 2021-04-14 PROCEDURE — 93306 TTE W/DOPPLER COMPLETE: CPT

## 2021-04-14 RX ORDER — BUMETANIDE 1 MG/1
2 TABLET ORAL ONCE
Status: COMPLETED | OUTPATIENT
Start: 2021-04-14 | End: 2021-04-14

## 2021-04-14 RX ORDER — BUMETANIDE 1 MG/1
2 TABLET ORAL 2 TIMES DAILY
Status: DISCONTINUED | OUTPATIENT
Start: 2021-04-15 | End: 2021-04-17 | Stop reason: HOSPADM

## 2021-04-14 RX ADMIN — PANTOPRAZOLE SODIUM 40 MG: 40 TABLET, DELAYED RELEASE ORAL at 05:56

## 2021-04-14 RX ADMIN — INSULIN GLARGINE 58 UNITS: 100 INJECTION, SOLUTION SUBCUTANEOUS at 08:39

## 2021-04-14 RX ADMIN — ACETAMINOPHEN 650 MG: 325 TABLET ORAL at 20:37

## 2021-04-14 RX ADMIN — OMEGA-3-ACID ETHYL ESTERS 2 G: 1 CAPSULE, LIQUID FILLED ORAL at 20:38

## 2021-04-14 RX ADMIN — METOPROLOL SUCCINATE 100 MG: 100 TABLET, EXTENDED RELEASE ORAL at 08:39

## 2021-04-14 RX ADMIN — ESCITALOPRAM 20 MG: 10 TABLET, FILM COATED ORAL at 08:40

## 2021-04-14 RX ADMIN — TRAZODONE HYDROCHLORIDE 50 MG: 50 TABLET ORAL at 20:38

## 2021-04-14 RX ADMIN — MONTELUKAST SODIUM 10 MG: 10 TABLET ORAL at 20:38

## 2021-04-14 RX ADMIN — SPIRONOLACTONE 25 MG: 25 TABLET ORAL at 08:40

## 2021-04-14 RX ADMIN — INSULIN LISPRO 35 UNITS: 100 INJECTION, SOLUTION INTRAVENOUS; SUBCUTANEOUS at 13:26

## 2021-04-14 RX ADMIN — INSULIN LISPRO 3 UNITS: 100 INJECTION, SOLUTION INTRAVENOUS; SUBCUTANEOUS at 20:36

## 2021-04-14 RX ADMIN — DILTIAZEM HYDROCHLORIDE 240 MG: 240 CAPSULE, EXTENDED RELEASE ORAL at 08:41

## 2021-04-14 RX ADMIN — TERBINAFINE 250 MG: 250 TABLET ORAL at 08:40

## 2021-04-14 RX ADMIN — ANTI-FUNGAL POWDER MICONAZOLE NITRATE TALC FREE: 1.42 POWDER TOPICAL at 20:48

## 2021-04-14 RX ADMIN — BUMETANIDE 2 MG: 1 TABLET ORAL at 17:49

## 2021-04-14 RX ADMIN — IPRATROPIUM BROMIDE AND ALBUTEROL SULFATE 1 AMPULE: .5; 3 SOLUTION RESPIRATORY (INHALATION) at 09:03

## 2021-04-14 RX ADMIN — ACETAMINOPHEN 650 MG: 325 TABLET ORAL at 13:41

## 2021-04-14 RX ADMIN — Medication 10 ML: at 20:48

## 2021-04-14 RX ADMIN — GABAPENTIN 300 MG: 300 CAPSULE ORAL at 08:39

## 2021-04-14 RX ADMIN — IPRATROPIUM BROMIDE AND ALBUTEROL SULFATE 1 AMPULE: .5; 3 SOLUTION RESPIRATORY (INHALATION) at 13:05

## 2021-04-14 RX ADMIN — IPRATROPIUM BROMIDE AND ALBUTEROL SULFATE 1 AMPULE: .5; 3 SOLUTION RESPIRATORY (INHALATION) at 19:32

## 2021-04-14 RX ADMIN — INSULIN LISPRO 35 UNITS: 100 INJECTION, SOLUTION INTRAVENOUS; SUBCUTANEOUS at 08:39

## 2021-04-14 RX ADMIN — DOXYCYCLINE HYCLATE 100 MG: 100 CAPSULE ORAL at 20:39

## 2021-04-14 RX ADMIN — EZETIMIBE 10 MG: 10 TABLET ORAL at 20:38

## 2021-04-14 RX ADMIN — INSULIN LISPRO 3 UNITS: 100 INJECTION, SOLUTION INTRAVENOUS; SUBCUTANEOUS at 08:39

## 2021-04-14 RX ADMIN — OMEGA-3-ACID ETHYL ESTERS 2 G: 1 CAPSULE, LIQUID FILLED ORAL at 08:40

## 2021-04-14 RX ADMIN — GABAPENTIN 300 MG: 300 CAPSULE ORAL at 20:37

## 2021-04-14 RX ADMIN — TRAMADOL HYDROCHLORIDE 50 MG: 50 TABLET, COATED ORAL at 15:02

## 2021-04-14 RX ADMIN — LEVOFLOXACIN 500 MG: 500 TABLET, FILM COATED ORAL at 08:40

## 2021-04-14 RX ADMIN — APIXABAN 5 MG: 5 TABLET, FILM COATED ORAL at 08:38

## 2021-04-14 RX ADMIN — FERROUS SULFATE TAB 325 MG (65 MG ELEMENTAL FE) 325 MG: 325 (65 FE) TAB at 08:39

## 2021-04-14 RX ADMIN — INSULIN LISPRO 35 UNITS: 100 INJECTION, SOLUTION INTRAVENOUS; SUBCUTANEOUS at 17:46

## 2021-04-14 RX ADMIN — BUMETANIDE 2 MG: 0.25 INJECTION INTRAMUSCULAR; INTRAVENOUS at 08:38

## 2021-04-14 RX ADMIN — TRAMADOL HYDROCHLORIDE 50 MG: 50 TABLET, COATED ORAL at 08:39

## 2021-04-14 RX ADMIN — INSULIN LISPRO 3 UNITS: 100 INJECTION, SOLUTION INTRAVENOUS; SUBCUTANEOUS at 13:27

## 2021-04-14 RX ADMIN — INSULIN GLARGINE 58 UNITS: 100 INJECTION, SOLUTION SUBCUTANEOUS at 20:36

## 2021-04-14 RX ADMIN — TRAMADOL HYDROCHLORIDE 50 MG: 50 TABLET, COATED ORAL at 21:46

## 2021-04-14 RX ADMIN — GABAPENTIN 300 MG: 300 CAPSULE ORAL at 15:02

## 2021-04-14 RX ADMIN — APIXABAN 5 MG: 5 TABLET, FILM COATED ORAL at 20:37

## 2021-04-14 RX ADMIN — DOXYCYCLINE HYCLATE 100 MG: 100 CAPSULE ORAL at 08:40

## 2021-04-14 ASSESSMENT — PAIN SCALES - GENERAL
PAINLEVEL_OUTOF10: 6
PAINLEVEL_OUTOF10: 8

## 2021-04-14 ASSESSMENT — PAIN DESCRIPTION - PAIN TYPE: TYPE: CHRONIC PAIN

## 2021-04-14 ASSESSMENT — PAIN DESCRIPTION - PROGRESSION: CLINICAL_PROGRESSION: GRADUALLY WORSENING

## 2021-04-14 ASSESSMENT — PAIN DESCRIPTION - ORIENTATION: ORIENTATION: RIGHT

## 2021-04-14 ASSESSMENT — PAIN DESCRIPTION - LOCATION: LOCATION: LEG

## 2021-04-14 NOTE — PROGRESS NOTES
[address] [place] [address] [facility]  SUZANNE  Progress Note    CC: leg pain / cellulitis   Face to face encounter   SUBJECTIVE:  Tolerating PO  Patient is tolerating medications. No reported adverse drug reactions. ROS: No nausea, vomiting, diarrhea. No fever.  Tenderness to lower extremities     Medications:  Scheduled Meds:   insulin lispro  35 Units Subcutaneous TID WC    bumetanide  2 mg Oral Once    [START ON 4/15/2021] bumetanide  2 mg Oral BID    insulin glargine  58 Units Subcutaneous BID    ezetimibe  10 mg Oral Nightly    levoFLOXacin  500 mg Oral Daily    doxycycline hyclate  100 mg Oral 2 times per day    insulin lispro  0-18 Units Subcutaneous 4x Daily AC & HS    apixaban  5 mg Oral BID    dilTIAZem  240 mg Oral Daily    escitalopram  20 mg Oral Daily    gabapentin  300 mg Oral TID    pantoprazole  40 mg Oral QAM AC    metoprolol succinate  100 mg Oral Daily    traZODone  50 mg Oral Nightly    montelukast  10 mg Oral Nightly    omega-3 acid ethyl esters  2 g Oral BID    spironolactone  25 mg Oral Daily    vitamin D  50,000 Units Oral Weekly    ferrous sulfate  325 mg Oral Daily with breakfast    ipratropium-albuterol  1 ampule Inhalation Q4H WA    terbinafine  250 mg Oral Daily    sodium chloride flush  5-40 mL Intravenous 2 times per day     Continuous Infusions:   dextrose      sodium chloride       PRN Meds:ipratropium-albuterol, miconazole, white petrolatum, glucose, dextrose, glucagon (rDNA), dextrose, traMADol, sodium chloride flush, sodium chloride, promethazine **OR** ondansetron, polyethylene glycol, acetaminophen **OR** acetaminophen  OBJECTIVE:  Patient Vitals for the past 24 hrs:   BP Temp Temp src Pulse Resp SpO2   04/14/21 0830 127/61 97.4 °F (36.3 °C) Temporal 87 20 98 %   04/14/21 0149 132/65 97.3 °F (36.3 °C) Temporal 82 18 98 %   04/13/21 1921 136/62 97.8 °F (36.6 °C) Temporal 84 18 98 %   04/13/21 1500 122/65 98.2 °F (36.8 °C) Oral 80 18 98 % Constitutional: The patient is awake, alert, and oriented. Up on bedside commode   Skin: Warm and dry. No rashes were noted. Head: Eyes show round, and reactive pupils. No jaundice. Mouth: Moist mucous membranes, no ulcerations, no thrush. Neck: Supple to movements. No lymphadenopathy. Chest: No use of accessory muscles to breathe. Symmetrical expansion. Auscultation reveals no wheezing, crackles, or rhonchi. Cardiovascular: S1 and S2 are rhythmic and regular. No murmurs appreciated. Abdomen: Positive bowel sounds to auscultation. Benign to palpation. Large  Extremities: ++lymphedema. ++ edema.  Bilateral legs  left leg with more erythema and tenderness than right improving  PIV            Laboratory and Tests Review:  Lab Results   Component Value Date    WBC 6.1 04/13/2021    WBC 6.6 04/11/2021    WBC 7.6 04/09/2021    HGB 8.6 (L) 04/13/2021    HCT 29.1 (L) 04/13/2021    MCV 82.4 04/13/2021     04/13/2021     Lab Results   Component Value Date    NEUTROABS 4.77 04/11/2021    NEUTROABS 5.79 04/09/2021    NEUTROABS 6.57 01/17/2021     Lab Results   Component Value Date    CRP 14.0 (H) 05/29/2020    CRP 2.2 (H) 12/24/2019    CRP 2.0 (H) 09/22/2019     Lab Results   Component Value Date    SEDRATE 75 (H) 05/29/2020    SEDRATE 41 (H) 12/24/2019    SEDRATE 38 (H) 09/22/2019     Lab Results   Component Value Date    ALT 7 04/09/2021    AST 8 04/09/2021    ALKPHOS 90 04/09/2021    BILITOT <0.2 04/09/2021     Lab Results   Component Value Date     04/13/2021    K 3.9 04/13/2021    CL 91 04/13/2021    CO2 41 04/13/2021    BUN 19 04/13/2021    CREATININE 0.8 04/13/2021    GFRAA >60 04/13/2021    LABGLOM >60 04/13/2021    GLUCOSE 172 04/13/2021    GLUCOSE 181 12/16/2011    PROT 6.1 04/09/2021    LABALBU 3.1 04/09/2021    LABALBU 4.5 11/02/2011    CALCIUM 9.1 04/13/2021    BILITOT <0.2 04/09/2021    ALKPHOS 90 04/09/2021    AST 8 04/09/2021    ALT 7 04/09/2021     Radiology:  Reviewed Microbiology:   Blood cx- no growth to date     ASSESSMENT:  · Recurrent cellulitis with chronic lymphedema  · Onychomycosis/ tinea pedis   · Pain to coccyx area     PLAN:  · Po levaquin and doxycycline for preparation for discharge  · Bone scan reviewed  · Continue lamisil   · Check EKG in am   · Monitor labs- reviewed   · Continue wound care     Mayito Abarca  1:04 PM  4/14/2021

## 2021-04-14 NOTE — FLOWSHEET NOTE
Location Orientation: Left; Lower   Wound Image    Wound Etiology   (no defined wounds)   Dressing/Treatment Ace wrap;ABD; Alginate;Dry dressing;Roll gauze   Wound Length (cm)   (no defined wounds)   dry crusty layers of skin  Coccyx, buttocks intact    **Informed Consent**    The patient has given verbal consent to have photos taken of legs and inserted into their chart as part of their permanent medical record for purposes of documentation, treatment management and/or medical review. All Images taken on 4/14/21 of patient name: Kevin Mcdonnell were transmitted and stored on secured Cartagenia located within [employer] Tab by a registered [employer] Mobile Application Device.        Impression:  lymphedema    Plan:  aquaphor dry skin  opticell to wet areas with leg wraps as at home; legs to edematous for tubi   Antifungal to abdominal fold  Will need continued preventative care  Seat cushion due to coccygeal pain  Kellie Mckenzie 4/14/2021 3:14 PM

## 2021-04-14 NOTE — PROGRESS NOTES
Q4H WA    terbinafine  250 mg Oral Daily    sodium chloride flush  5-40 mL Intravenous 2 times per day       IV Infusions (if any):   dextrose      sodium chloride         DIAGNOSTIC/ LABORATORY DATA:  Labs:   CBC:   Recent Labs     04/11/21  1535 04/13/21  0433   WBC  --  6.1   HGB 9.4* 8.6*   HCT 32.4* 29.1*   PLT  --  315     BMP:   Recent Labs     04/12/21  1540 04/13/21  0433    139   K 4.0 3.9   CO2 36* 41*   BUN 19 19   CREATININE 0.9 0.8   LABGLOM >60 >60   CALCIUM 9.2 9.1     HgA1c:   Lab Results   Component Value Date    LABA1C 10.4 (H) 04/11/2021     CARDIAC ENZYMES:  No results for input(s): CKTOTAL, CKMB, CKMBINDEX, TROPONINI in the last 72 hours. FASTING LIPID PANEL:  Lab Results   Component Value Date    CHOL 355 01/16/2021    HDL 52 01/16/2021    LDLCALC 243 01/16/2021    TRIG 298 01/16/2021     LIVER PROFILE:  No results for input(s): AST, ALT, LABALBU in the last 72 hours. CXR:     Telemetry:  12 lead EKG:    TTE 1/16/2018, Dr. Zachariah Urrutia concentric left ventricular hypertrophy. EF visually estimated at 55-65%. Stage II diastolic dysfunction. The left atrium is severely dilated. Elevated L atrial pressure. Mild mitral regurgitation. The aortic valve appears mildly sclerotic. Pulmonary hypertension is mild to moderate. No evidence of pericardial effusion.      Adenosine Stress test 8/2008: No stress induced ischemia, EF 54%      ENT: clear, no bleeding. No external masses. Lips normal formation. Neck: Very difficult to examine. Supple, full ROM, unable to evaluate for JVD (sitting on a bedside commode), no bruits, no lymphadenopathy. No masses. trachea midline. Heart: Distant. Regular rate & rhythm, normal S1 & S2, II/VI systolic murmur, S4 gallop. No heave. Lungs: Very poor air movement. .  No accessory muscles. Bibasilar rales. Abd: soft, non-tender. Normal bowel sounds. Morbidly obese. Neuro: Full ROM X 4, EOMI, no tremors.   EXT: Severe bilateral lymphedema bilateral lower extremity edema  Skin: warm, dry, intact. Good turgor. Assessment/recommendations:  1. Very difficult exam.  Her breathing has improved. She has poor air movement due to her lung disease but her lungs are clear to auscultation. We will give 1 more dose of IV diuretics today and then change to oral tomorrow. Okay for discharge with cardiology. Cardiology will sign off.  2. Severe/oxygen requiring COPD. 3. Severe chronic lymphedema. 4. Cellulitis. 5. Stage II diastolic dysfunction  6. Paroxysmal atrial fibrillation  7. Hypertension  8. LVH  9. PFO  10. Super morbid obesity  11. Pulmonary hypertension  12. Diabetes  13. Anemia  14. Untreated sleep apnea. As above, I will defer the comorbidities to others. Electronically signed by Drew Mason DO on 4/14/2021 at 9:19 AM    Note: This report was completed using computerized voice recognition software. Every effort has been made to ensure accuracy, however; and invert and computerized transcription errors may be present.

## 2021-04-14 NOTE — CARE COORDINATION
Transition of Care-Met with patient bedside to confirm discharge plan-she is still in agreement to go to [facility], spoke to liaison, she can accept will start pre-cert. Ambulance form, HENS and envelope are in soft chart. Discharge pending transition to PO diuretics ( tomorrow ), patient will discharge on PO antibiotics per ID.      Breanne ORTEZN, RN  [facility]   [phone]

## 2021-04-14 NOTE — PROGRESS NOTES
OT BEDSIDE TREATMENT NOTE      Date:2021  Patient Name: Brittney Gallagher  MRN: [id number]  : 1955  Room: [address] [place] [address]     Per OT Eval:    Evaluating OT:  Myrtie Simmonds, MOT, OTR/L #[id number]     AM-PAC Daily Activity Raw Score:  15/24  Recommended Adaptive Equipment:  TBD as pt progresses - Extended Tub Bench, Adaptive equipment     Reason for Admission:  Pt was admitted w/ SOB, Redness/swelling R LE     Diagnosis:  Cellulitis L LE, CHF, Erosion of Bone     Procedures this admission:  None      Pertinent Medical History:  Depression/Anxiety, Arthritis, Asthma, CHF, COPD, DM, Hx of Blood Clots, HTN, Lymphedema, L TKR, R LE Fx/surgical repair     Precautions:  Falls  R LE/swelling/limited knee ext  3L O2  Hansen Catheter     Home Living: Pt lives with her  and 2 adult grandsons in a 2-story house, stair-glide to 2nd floor. Sleeps in [address] on 1st floor, uses BSC and sponge bathes. Bathroom setup:  Walk-in-Shower, Standard-height Commode   Equipment owned:  W/C, Rollator, BSC, Home O2 2L, Lift Chair     Available Family Assist:  Family can provide 24/7 assist     Prior Level of Function:  Pt reported receiving assist w/ Hair Care, LB dressing, Toileting/Hygiene and Sponge bathing, Mod I for Transfers and Mobility using Rollator for household ambulation. W/C for outings - appts only.   Family members complete all IADLs  Driving:  No  Occupation:  None     Pain Level: Pt complained of BLE pain this session  Additional Complaints:  General fatigue/weakness     Vitals/Lab Values:  O2 sats remained > 92% w/ 3L O2      Cognition: A & O x 3 - cues for day/date        Able to Follow Multi-Step Commands w/ Min VCs for safety              Memory:  fair (+)              Sequencing:  fair (+)              Problem solving:  fair               Judgement/safety:  fair   Additional Comments:  Pt was pleasant, cooperative, Mod encouragement for minimal Ax                    Functional Assessment:    Initial Eval Status  Date: 4-13-21 Treatment Status  Date: 4/14/21 Short Term/Long Term Goals  Treatment frequency: PRN 1-3 x/week   1-2 weeks   Feeding Set up     Able to feed self, assist to open containers/set up tray  Setup NA   Grooming Mod A     SUP/Set up to wash hands/face, Mod A for hair care - seated in chair     Unable to tolerate ambulating to or standing at sink     Giuseppe  Pt washed face, applied deodorant seated, assistance to comb hair Min A  Standing At The Sink   UB Dressing Mod A     To don robe after set up seated in chair    modA  [facility] gown seated upright in chair  Min A   LB Dressing Max A     Max A to don socks seated   Mod A for dynamic standing balance + Max A for simulated clothing adjustment over hips     Pt ed for safety, adaptive techs/equip     [facility] socks seated upright in chair    Attempted to have pt  Use of AE this date, with pt denying to to BLE pain, stating of heel hurting    Pt staing of  assisting with LB dressing at home Mod A   Bathing NT       maxA  Pt completed sponge bathing task seated/standing, with pt able to wash of UB, requiring assistance to wash of LB and stand to wash of buttocks/gurmeet area     Pt stating of  assisting with bathing task at home Mod A   Toileting Max A     Gerber Catheter  Mod A for dynamic standing balance + Max A for simulated clothing adjustment over hips    maxA  Pt having of gerber catheter    BSC present in room  Mod A   Bed Mobility  Rolling: Mod A  Repositioning:  Max A   Supine to Sit:  NT    Sit to Supine: Mod A      Assist to lift Randal LEs  maxA  EOB<>Supine    Assistance with BLE's  Min A   Functional Transfers Sit to stand: Mod A  Stand to sit:  Min A       Mod A to stand from Chair, EOB  Pt ed re: safety/hand placement     modA  Sit to Stand  Stand to Sit    Stand Pivot Transfer EOB<>Chair with w.w.     Cueing for hand palcement Min A   Functional Mobility Mod A     Mod A w/ WW b/t surfaces only, VCs for improved safety     modA  Pt took of short steps during SPT with w.w, slow gait, cueing for safety Min A   Balance Sitting:      Static:  IND in chair, SUP EOB    Dynamic:  Close SUP w/ functional ax EOB     Standing:      Static:  Min A w/ [employer]    Dynamic:  Mod A w/ functional ax/mobility w/ [employer]    Sitting Supported:  Independent    Standing:  modA      Activity Tolerance Fair  Limited by General Weakness/fatigue, Pain/limited ROM R LE     Tolerated Sitting: In chair extended time, EOB ~ 10 mins w/ functional ax      Tolerated Standing:  ~ 2-3 mins w/ functional ax/mobility     Fair      Visual/  Perceptual WFL  Glasses:  Reading        Hearing WFL  Hearing Aids  No          Hand dominance: Right     UE ROM:        RUE:     WFL                                          LUE:     WFL     Strength:        RUE:    grossly 4/5                                 LUE:    grossly 4/5      Strength:  WFL Randal UEs     Fine Motor Coordination:  WFL Randal UEs     Sensation:  Denies numbness or tingling Randal UEs  Tone:  WFL Randal UEs  Edema:  None Noted Randal UEs, Chronic edema/Lymphedema Randal LEs        Treatment/Comments: Upon arrival pt seated EOB, agreeable to therapy. Pt completed of bed mobility, functional mobility of short steps, transfers and ADL tasks this session. Pt educated on importance of therapy and OOB activity, safety and hand placement with transfers, safety and walker management with mobility, use of AE to assist with LB dressing task. At end of session, pt supine in bed, all lines and tubes intact, call light within reach, wound care present. · Pt has made fair progress towards set goals.    · Continue with current plan of care focusing on increasing of independency with transfers and ADL tasks      Treatment Time In: 2:17pm           Treatment Time Out: 2:45pm             Treatment Charges: Mins Units   Ther Ex  [id number]     Manual Therapy Burt Cuevas [id number] 10 1   ADL/Home Mgt [id number] 18 1   Neuro Re-ed [id number]     Group Therapy      Orthotic manage/training  [id number]     Non-Billable Time     Total Timed Treatment 28 2        Medina Reyes PARIKH/L [id number]

## 2021-04-14 NOTE — PROGRESS NOTES
Hospitalist Progress Note      SYNOPSIS: Patient admitted on  75-year-old female patient presents to emergency department for increasing shortness of breath over the last few days. [facility] was admitted with 2 chief problems of acute exacerbation of chronic heart failure and cellulitis of bilateral lower extremities. SUBJECTIVE:    Patient seen and examined in her room. Sitting up in chair    She has no current complaints  Lying in bed being repositioned. Stable overnight. No other overnight issues reported. Temp (24hrs), Av.7 °F (36.5 °C), Min:97.3 °F (36.3 °C), Max:98.2 °F (36.8 °C)    DIET: DIET CARB CONTROL; Low Sodium (2 GM)  CODE: Full Code    Intake/Output Summary (Last 24 hours) at 2021 0900  Last data filed at 2021 0458  Gross per 24 hour   Intake 10 ml   Output 6000 ml   Net -5990 ml       OBJECTIVE:    /61   Pulse 87   Temp 97.4 °F (36.3 °C) (Temporal)   Resp 20   Ht 5' 3\" (1.6 m)   Wt (!) 307 lb 9.6 oz (139.5 kg)   LMP  (LMP Unknown)   SpO2 98%   BMI 54.49 kg/m²     General appearance: No apparent distress, appears stated age and cooperative. HEENT:  Conjunctivae/corneas clear. Neck: Supple. No jugular venous distention. Respiratory: Clear to auscultation bilaterally, normal respiratory effort. Bilateral basal crackles noted. Cardiovascular: Regular rate rhythm, normal S1-S2  Abdomen: Soft, nontender, nondistended  Musculoskeletal: No clubbing, cyanosis, no bilateral lower extremity edema. Brisk capillary refill. Skin:  Changes of bilateral lower extremities noted.   Neurologic: awake, alert and following commands     ASSESSMENT & PLAN:    Acute on chronic HFpEF  With acute hypoxemic respiratory failure requiring more than baseline oxygen  Currently on oral Bumex 2 mg twice daily as per cardiology  Hansen for accurate I's and O's  Daily weight  Diuresis as per cardiology  Echocardiogram is still currently pending    Cellulitis  Cellulitis of 04/13/21  0433    139   K 4.0 3.9   CL 87* 91*   CO2 36* 41*   BUN 19 19   CREATININE 0.9 0.8   CALCIUM 9.2 9.1       No results for input(s): PROT, ALB, ALKPHOS, ALT, AST, BILITOT, AMYLASE, LIPASE in the last 72 hours. No results for input(s): INR in the last 72 hours. No results for input(s): Eliana Grumet in the last 72 hours. Chronic labs:    Lab Results   Component Value Date    CHOL 355 (H) 01/16/2021    TRIG 298 (H) 01/16/2021    HDL 52 01/16/2021    LDLCALC 243 (H) 01/16/2021    TSH 0.575 01/15/2021    INR 1.1 01/12/2021    LABA1C 10.4 (H) 04/11/2021       Radiology: REVIEWED DAILY    +++++++++++++++++++++++++++++++++++++++++++++++++  Zayra Barakat  [facility] Physician - [address] [facility], [place]  +++++++++++++++++++++++++++++++++++++++++++++++++  NOTE: This report was transcribed using voice recognition software. Every effort was made to ensure accuracy; however, inadvertent computerized transcription errors may be present.

## 2021-04-14 NOTE — TELEPHONE ENCOUNTER
No availability with Julio Cesar Rodriguez, CNS until 4/29/21. Therefore, F/U scheduled with Dr. Alina Mayorga on 4/20/21 at 9:20 a.m. Patient still inpatient today.

## 2021-04-15 LAB
CULTURE, BLOOD 2: NORMAL
METER GLUCOSE: 151 MG/DL (ref 74–99)
METER GLUCOSE: 183 MG/DL (ref 74–99)
METER GLUCOSE: 218 MG/DL (ref 74–99)
METER GLUCOSE: 84 MG/DL (ref 74–99)

## 2021-04-15 PROCEDURE — 94640 AIRWAY INHALATION TREATMENT: CPT

## 2021-04-15 PROCEDURE — 6370000000 HC RX 637 (ALT 250 FOR IP): Performed by: EMERGENCY MEDICINE

## 2021-04-15 PROCEDURE — 6370000000 HC RX 637 (ALT 250 FOR IP): Performed by: CLINICAL NURSE SPECIALIST

## 2021-04-15 PROCEDURE — 6370000000 HC RX 637 (ALT 250 FOR IP): Performed by: INTERNAL MEDICINE

## 2021-04-15 PROCEDURE — 99232 SBSQ HOSP IP/OBS MODERATE 35: CPT | Performed by: INTERNAL MEDICINE

## 2021-04-15 PROCEDURE — 2060000000 HC ICU INTERMEDIATE R&B

## 2021-04-15 PROCEDURE — 82962 GLUCOSE BLOOD TEST: CPT

## 2021-04-15 PROCEDURE — 6370000000 HC RX 637 (ALT 250 FOR IP): Performed by: SPECIALIST

## 2021-04-15 PROCEDURE — 6370000000 HC RX 637 (ALT 250 FOR IP): Performed by: FAMILY MEDICINE

## 2021-04-15 PROCEDURE — 2700000000 HC OXYGEN THERAPY PER DAY

## 2021-04-15 RX ORDER — DOXYCYCLINE HYCLATE 100 MG/1
100 CAPSULE ORAL EVERY 12 HOURS SCHEDULED
Qty: 14 CAPSULE | Refills: 0 | Status: SHIPPED | OUTPATIENT
Start: 2021-04-15 | End: 2021-04-22

## 2021-04-15 RX ORDER — INSULIN GLARGINE 100 [IU]/ML
58 INJECTION, SOLUTION SUBCUTANEOUS 2 TIMES DAILY
Qty: 1 VIAL | Refills: 3
Start: 2021-04-15 | End: 2021-04-16 | Stop reason: HOSPADM

## 2021-04-15 RX ORDER — LEVOFLOXACIN 500 MG/1
500 TABLET, FILM COATED ORAL DAILY
Qty: 7 TABLET | Refills: 0 | Status: SHIPPED | OUTPATIENT
Start: 2021-04-16 | End: 2021-04-23

## 2021-04-15 RX ORDER — TERBINAFINE HYDROCHLORIDE 250 MG/1
250 TABLET ORAL DAILY
Qty: 30 TABLET | Refills: 0 | Status: SHIPPED | OUTPATIENT
Start: 2021-04-16 | End: 2021-05-16

## 2021-04-15 RX ADMIN — GABAPENTIN 300 MG: 300 CAPSULE ORAL at 14:40

## 2021-04-15 RX ADMIN — IPRATROPIUM BROMIDE AND ALBUTEROL SULFATE 1 AMPULE: .5; 3 SOLUTION RESPIRATORY (INHALATION) at 19:59

## 2021-04-15 RX ADMIN — TRAMADOL HYDROCHLORIDE 50 MG: 50 TABLET, COATED ORAL at 16:41

## 2021-04-15 RX ADMIN — TRAMADOL HYDROCHLORIDE 50 MG: 50 TABLET, COATED ORAL at 03:54

## 2021-04-15 RX ADMIN — IPRATROPIUM BROMIDE AND ALBUTEROL SULFATE 1 AMPULE: .5; 3 SOLUTION RESPIRATORY (INHALATION) at 07:27

## 2021-04-15 RX ADMIN — GABAPENTIN 300 MG: 300 CAPSULE ORAL at 21:05

## 2021-04-15 RX ADMIN — OMEGA-3-ACID ETHYL ESTERS 2 G: 1 CAPSULE, LIQUID FILLED ORAL at 21:06

## 2021-04-15 RX ADMIN — DOXYCYCLINE HYCLATE 100 MG: 100 CAPSULE ORAL at 10:36

## 2021-04-15 RX ADMIN — IPRATROPIUM BROMIDE AND ALBUTEROL SULFATE 1 AMPULE: .5; 3 SOLUTION RESPIRATORY (INHALATION) at 15:20

## 2021-04-15 RX ADMIN — DOXYCYCLINE HYCLATE 100 MG: 100 CAPSULE ORAL at 21:04

## 2021-04-15 RX ADMIN — INSULIN LISPRO 35 UNITS: 100 INJECTION, SOLUTION INTRAVENOUS; SUBCUTANEOUS at 17:47

## 2021-04-15 RX ADMIN — METOPROLOL SUCCINATE 100 MG: 100 TABLET, EXTENDED RELEASE ORAL at 10:34

## 2021-04-15 RX ADMIN — BUMETANIDE 2 MG: 1 TABLET ORAL at 21:04

## 2021-04-15 RX ADMIN — ACETAMINOPHEN 650 MG: 325 TABLET ORAL at 14:40

## 2021-04-15 RX ADMIN — OMEGA-3-ACID ETHYL ESTERS 2 G: 1 CAPSULE, LIQUID FILLED ORAL at 10:35

## 2021-04-15 RX ADMIN — INSULIN LISPRO 6 UNITS: 100 INJECTION, SOLUTION INTRAVENOUS; SUBCUTANEOUS at 17:03

## 2021-04-15 RX ADMIN — EZETIMIBE 10 MG: 10 TABLET ORAL at 21:05

## 2021-04-15 RX ADMIN — GABAPENTIN 300 MG: 300 CAPSULE ORAL at 10:34

## 2021-04-15 RX ADMIN — SPIRONOLACTONE 25 MG: 25 TABLET ORAL at 10:35

## 2021-04-15 RX ADMIN — ESCITALOPRAM 20 MG: 10 TABLET, FILM COATED ORAL at 10:36

## 2021-04-15 RX ADMIN — ANTI-FUNGAL POWDER MICONAZOLE NITRATE TALC FREE: 1.42 POWDER TOPICAL at 10:37

## 2021-04-15 RX ADMIN — TRAMADOL HYDROCHLORIDE 50 MG: 50 TABLET, COATED ORAL at 10:34

## 2021-04-15 RX ADMIN — APIXABAN 5 MG: 5 TABLET, FILM COATED ORAL at 21:05

## 2021-04-15 RX ADMIN — APIXABAN 5 MG: 5 TABLET, FILM COATED ORAL at 10:34

## 2021-04-15 RX ADMIN — MONTELUKAST SODIUM 10 MG: 10 TABLET ORAL at 21:05

## 2021-04-15 RX ADMIN — ACETAMINOPHEN 650 MG: 325 TABLET ORAL at 21:04

## 2021-04-15 RX ADMIN — ACETAMINOPHEN 650 MG: 325 TABLET ORAL at 04:42

## 2021-04-15 RX ADMIN — ANTI-FUNGAL POWDER MICONAZOLE NITRATE TALC FREE: 1.42 POWDER TOPICAL at 21:07

## 2021-04-15 RX ADMIN — DILTIAZEM HYDROCHLORIDE 240 MG: 240 CAPSULE, EXTENDED RELEASE ORAL at 10:35

## 2021-04-15 RX ADMIN — INSULIN GLARGINE 58 UNITS: 100 INJECTION, SOLUTION SUBCUTANEOUS at 21:07

## 2021-04-15 RX ADMIN — TERBINAFINE 250 MG: 250 TABLET ORAL at 10:35

## 2021-04-15 RX ADMIN — INSULIN LISPRO 35 UNITS: 100 INJECTION, SOLUTION INTRAVENOUS; SUBCUTANEOUS at 07:46

## 2021-04-15 RX ADMIN — PANTOPRAZOLE SODIUM 40 MG: 40 TABLET, DELAYED RELEASE ORAL at 06:37

## 2021-04-15 RX ADMIN — LEVOFLOXACIN 500 MG: 500 TABLET, FILM COATED ORAL at 10:36

## 2021-04-15 RX ADMIN — FERROUS SULFATE TAB 325 MG (65 MG ELEMENTAL FE) 325 MG: 325 (65 FE) TAB at 10:34

## 2021-04-15 RX ADMIN — TRAMADOL HYDROCHLORIDE 50 MG: 50 TABLET, COATED ORAL at 23:02

## 2021-04-15 RX ADMIN — BUMETANIDE 2 MG: 1 TABLET ORAL at 10:36

## 2021-04-15 RX ADMIN — INSULIN LISPRO 3 UNITS: 100 INJECTION, SOLUTION INTRAVENOUS; SUBCUTANEOUS at 21:07

## 2021-04-15 RX ADMIN — INSULIN LISPRO 3 UNITS: 100 INJECTION, SOLUTION INTRAVENOUS; SUBCUTANEOUS at 06:42

## 2021-04-15 RX ADMIN — TRAZODONE HYDROCHLORIDE 50 MG: 50 TABLET ORAL at 21:06

## 2021-04-15 ASSESSMENT — PAIN SCALES - GENERAL
PAINLEVEL_OUTOF10: 6
PAINLEVEL_OUTOF10: 2
PAINLEVEL_OUTOF10: 7

## 2021-04-15 ASSESSMENT — PAIN DESCRIPTION - PROGRESSION: CLINICAL_PROGRESSION: GRADUALLY WORSENING

## 2021-04-15 NOTE — PLAN OF CARE
Problem: Falls - Risk of:  Goal: Will remain free from falls  Description: Will remain free from falls  Outcome: Met This Shift  Goal: Absence of physical injury  Description: Absence of physical injury  Outcome: Met This Shift     Problem: Skin Integrity:  Goal: Will show no infection signs and symptoms  Description: Will show no infection signs and symptoms  Outcome: Met This Shift  Goal: Absence of new skin breakdown  Description: Absence of new skin breakdown  Outcome: Met This Shift     Problem: Pain:  Goal: Pain level will decrease  Description: Pain level will decrease  Outcome: Not Met This Shift  Goal: Control of acute pain  Description: Control of acute pain  Outcome: Not Met This Shift  Goal: Control of chronic pain  Description: Control of chronic pain  Outcome: Not Met This Shift

## 2021-04-15 NOTE — PROGRESS NOTES
[address] [place] [address] [facility]  DARLYNIDA  Progress Note    CC: leg pain / cellulitis   Face to face encounter   SUBJECTIVE:  Tolerating PO  Patient is tolerating medications. No reported adverse drug reactions. ROS: No nausea, vomiting, diarrhea. No fever. Tenderness to lower extremities     Medications:  Scheduled Meds:   insulin lispro  35 Units Subcutaneous TID WC    bumetanide  2 mg Oral BID    miconazole   Topical BID    insulin glargine  58 Units Subcutaneous BID    ezetimibe  10 mg Oral Nightly    levoFLOXacin  500 mg Oral Daily    doxycycline hyclate  100 mg Oral 2 times per day    insulin lispro  0-18 Units Subcutaneous 4x Daily AC & HS    apixaban  5 mg Oral BID    dilTIAZem  240 mg Oral Daily    escitalopram  20 mg Oral Daily    gabapentin  300 mg Oral TID    pantoprazole  40 mg Oral QAM AC    metoprolol succinate  100 mg Oral Daily    traZODone  50 mg Oral Nightly    montelukast  10 mg Oral Nightly    omega-3 acid ethyl esters  2 g Oral BID    spironolactone  25 mg Oral Daily    vitamin D  50,000 Units Oral Weekly    ferrous sulfate  325 mg Oral Daily with breakfast    ipratropium-albuterol  1 ampule Inhalation Q4H WA    terbinafine  250 mg Oral Daily    sodium chloride flush  5-40 mL Intravenous 2 times per day     Continuous Infusions:   dextrose      sodium chloride       PRN Meds:ipratropium-albuterol, miconazole, white petrolatum, glucose, dextrose, glucagon (rDNA), dextrose, traMADol, sodium chloride flush, sodium chloride, promethazine **OR** ondansetron, polyethylene glycol, acetaminophen **OR** acetaminophen  OBJECTIVE:  Patient Vitals for the past 24 hrs:   BP Temp Temp src Pulse Resp SpO2   04/15/21 1030 113/60 97.8 °F (36.6 °C) Temporal 79 20 96 %   04/14/21 2010 (!) 118/54 97.4 °F (36.3 °C) Temporal 87 20 96 %   04/14/21 1730 137/64 97.8 °F (36.6 °C) Temporal 84 20 97 %     Constitutional: The patient is awake, alert, and oriented.  Up on bedside commode Skin: Warm and dry. No rashes were noted. Head: Eyes show round, and reactive pupils. No jaundice. Mouth: Moist mucous membranes, no ulcerations, no thrush. Neck: Supple to movements. No lymphadenopathy. Chest: No use of accessory muscles to breathe. Symmetrical expansion. Auscultation reveals no wheezing, crackles, or rhonchi. Cardiovascular: S1 and S2 are rhythmic and regular. No murmurs appreciated. Abdomen: Positive bowel sounds to auscultation. Benign to palpation. Large  Extremities: ++lymphedema. ++ edema.  Bilateral legs  left leg with more erythema and tenderness than right improving  PIV              bilat leg wrapped today      Laboratory and Tests Review:  Lab Results   Component Value Date    WBC 6.1 04/13/2021    WBC 6.6 04/11/2021    WBC 7.6 04/09/2021    HGB 8.6 (L) 04/13/2021    HCT 29.1 (L) 04/13/2021    MCV 82.4 04/13/2021     04/13/2021     Lab Results   Component Value Date    NEUTROABS 4.77 04/11/2021    NEUTROABS 5.79 04/09/2021    NEUTROABS 6.57 01/17/2021     Lab Results   Component Value Date    CRP 14.0 (H) 05/29/2020    CRP 2.2 (H) 12/24/2019    CRP 2.0 (H) 09/22/2019     Lab Results   Component Value Date    SEDRATE 75 (H) 05/29/2020    SEDRATE 41 (H) 12/24/2019    SEDRATE 38 (H) 09/22/2019     Lab Results   Component Value Date    ALT 7 04/09/2021    AST 8 04/09/2021    ALKPHOS 90 04/09/2021    BILITOT <0.2 04/09/2021     Lab Results   Component Value Date     04/13/2021    K 3.9 04/13/2021    CL 91 04/13/2021    CO2 41 04/13/2021    BUN 19 04/13/2021    CREATININE 0.8 04/13/2021    GFRAA >60 04/13/2021    LABGLOM >60 04/13/2021    GLUCOSE 172 04/13/2021    GLUCOSE 181 12/16/2011    PROT 6.1 04/09/2021    LABALBU 3.1 04/09/2021    LABALBU 4.5 11/02/2011    CALCIUM 9.1 04/13/2021    BILITOT <0.2 04/09/2021    ALKPHOS 90 04/09/2021    AST 8 04/09/2021    ALT 7 04/09/2021     Radiology:  Reviewed     Microbiology:   Blood cx- no growth to date ASSESSMENT:  · Recurrent cellulitis with chronic lymphedema  · Onychomycosis/ tinea pedis   · Pain to coccyx area     PLAN:  · Po levaquin and doxycycline for preparation for discharge-med rec complete  · Bone scan reviewed  · Continue lamisil   · Check EKG in am SR, ,   · Monitor labs- reviewed   · Continue wound care     Hernando Cedeno  10:38 AM  4/15/2021       Pt seen and examined. Above discussed agree with advanced practice nurse. Labs, cultures, and radiographs reviewed. Face to Face encounter occurred. Changes made as necessary.      Gerardo Ann MD

## 2021-04-15 NOTE — PROGRESS NOTES
Hospitalist Progress Note      SYNOPSIS: Patient admitted on  57-year-old female patient presents to emergency department for increasing shortness of breath over the last few days. Keturah Lawler was admitted with 2 chief problems of acute exacerbation of chronic heart failure and cellulitis of bilateral lower extremities. SUBJECTIVE:    Patient seen and examined in her room. Sitting up in chair    She has no current complaints  Improving slowly but steadily     Stable overnight. No other overnight issues reported. Temp (24hrs), Av.7 °F (36.5 °C), Min:97.4 °F (36.3 °C), Max:97.8 °F (36.6 °C)    DIET: DIET CARB CONTROL; Low Sodium (2 GM)  CODE: Full Code    Intake/Output Summary (Last 24 hours) at 4/15/2021 1245  Last data filed at 4/15/2021 0744  Gross per 24 hour   Intake 240 ml   Output 5300 ml   Net -5060 ml       OBJECTIVE:    /60   Pulse 79   Temp 97.8 °F (36.6 °C) (Temporal)   Resp 20   Ht 5' 3\" (1.6 m)   Wt (!) 307 lb 9.6 oz (139.5 kg)   LMP  (LMP Unknown)   SpO2 96%   BMI 54.49 kg/m²     General appearance: No apparent distress, appears stated age and cooperative. HEENT:  Conjunctivae/corneas clear. Neck: Supple. No jugular venous distention. Respiratory: Clear to auscultation bilaterally, normal respiratory effort. Bilateral basal crackles noted. Cardiovascular: Regular rate rhythm, normal S1-S2  Abdomen: Soft, nontender, nondistended  Musculoskeletal: No clubbing, cyanosis, +++ BLE edema is present Brisk capillary refill. Skin:  Changes of bilateral lower extremities noted. Neurologic: awake, alert and following commands     ASSESSMENT & PLAN:    Acute on chronic HFpEF  With acute hypoxemic respiratory failure requiring more than baseline oxygen  Transition to p.o.  Lasix today  Hansen for accurate I's and O's  Daily weight  Diuresis as per cardiology  Echocardiogram is still currently pending    Cellulitis  Cellulitis of bilateral lower extremity, likely due to edema  P.o.  Levaquin and doxycycline, Lamisil    A. fib  Rate well controlled  Continue Eliquis for anticoagulation    COPD  No acute exacerbation suspected  Continue home dose regimen    Uncontrolled diabetes mellitus type 2  Continue current regimen  Endocrinology following    Hypertension  Blood pressure well controlled  Continue current medications    Anemia  Appears chronic and stable    Hyperlipidemia  Continue statin    Class III morbid obesity    Lymphedema of bilateral lower extremity    Obstructive sleep apnea    DISPOSITION:   [facility]    Medications:  REVIEWED DAILY    Infusion Medications    dextrose      sodium chloride       Scheduled Medications    insulin lispro  35 Units Subcutaneous TID WC    bumetanide  2 mg Oral BID    miconazole   Topical BID    insulin glargine  58 Units Subcutaneous BID    ezetimibe  10 mg Oral Nightly    levoFLOXacin  500 mg Oral Daily    doxycycline hyclate  100 mg Oral 2 times per day    insulin lispro  0-18 Units Subcutaneous 4x Daily AC & HS    apixaban  5 mg Oral BID    dilTIAZem  240 mg Oral Daily    escitalopram  20 mg Oral Daily    gabapentin  300 mg Oral TID    pantoprazole  40 mg Oral QAM AC    metoprolol succinate  100 mg Oral Daily    traZODone  50 mg Oral Nightly    montelukast  10 mg Oral Nightly    omega-3 acid ethyl esters  2 g Oral BID    spironolactone  25 mg Oral Daily    vitamin D  50,000 Units Oral Weekly    ferrous sulfate  325 mg Oral Daily with breakfast    ipratropium-albuterol  1 ampule Inhalation Q4H WA    terbinafine  250 mg Oral Daily    sodium chloride flush  5-40 mL Intravenous 2 times per day     PRN Meds: ipratropium-albuterol, miconazole, white petrolatum, glucose, dextrose, glucagon (rDNA), dextrose, traMADol, sodium chloride flush, sodium chloride, promethazine **OR** ondansetron, polyethylene glycol, acetaminophen **OR** acetaminophen    Labs:     Recent Labs     04/13/21  0433   WBC 6.1   HGB 8.6*   HCT 29.1*        Recent Labs     04/12/21  1540 04/13/21  0433    139   K 4.0 3.9   CL 87* 91*   CO2 36* 41*   BUN 19 19   CREATININE 0.9 0.8   CALCIUM 9.2 9.1       No results for input(s): PROT, ALB, ALKPHOS, ALT, AST, BILITOT, AMYLASE, LIPASE in the last 72 hours. No results for input(s): INR in the last 72 hours. No results for input(s): [place] in the last 72 hours. Chronic labs:    Lab Results   Component Value Date    CHOL 355 (H) 01/16/2021    TRIG 298 (H) 01/16/2021    HDL 52 01/16/2021    LDLCALC 243 (H) 01/16/2021    TSH 0.575 01/15/2021    INR 1.1 01/12/2021    LABA1C 10.4 (H) 04/11/2021       Radiology: REVIEWED DAILY    +++++++++++++++++++++++++++++++++++++++++++++++++  [facility] Physician - 2020 [facility], [place]  +++++++++++++++++++++++++++++++++++++++++++++++++  NOTE: This report was transcribed using voice recognition software. Every effort was made to ensure accuracy; however, inadvertent computerized transcription errors may be present.

## 2021-04-15 NOTE — CARE COORDINATION
Notified [employer] patient stable for discharge if auth obtained. OK for GMF later today if no auth obtained. On PO antibiotic. HENS and ambulette on soft chart. For questions I can be reached at [phone].  [place], [place]

## 2021-04-16 LAB
METANEPH/PLASMA INTERP: NORMAL
METANEPHRINE FREE PLASMA: <0.1 NMOL/L (ref 0–0.49)
METER GLUCOSE: 122 MG/DL (ref 74–99)
METER GLUCOSE: 156 MG/DL (ref 74–99)
METER GLUCOSE: 157 MG/DL (ref 74–99)
METER GLUCOSE: 201 MG/DL (ref 74–99)
NORMETANEPHRINE FREE PLASMA: 0.5 NMOL/L (ref 0–0.89)
SARS-COV-2, NAAT: NOT DETECTED

## 2021-04-16 PROCEDURE — 6370000000 HC RX 637 (ALT 250 FOR IP): Performed by: EMERGENCY MEDICINE

## 2021-04-16 PROCEDURE — 99232 SBSQ HOSP IP/OBS MODERATE 35: CPT | Performed by: INTERNAL MEDICINE

## 2021-04-16 PROCEDURE — 94640 AIRWAY INHALATION TREATMENT: CPT

## 2021-04-16 PROCEDURE — 2580000003 HC RX 258: Performed by: EMERGENCY MEDICINE

## 2021-04-16 PROCEDURE — 2700000000 HC OXYGEN THERAPY PER DAY

## 2021-04-16 PROCEDURE — 6370000000 HC RX 637 (ALT 250 FOR IP): Performed by: FAMILY MEDICINE

## 2021-04-16 PROCEDURE — 6370000000 HC RX 637 (ALT 250 FOR IP): Performed by: INTERNAL MEDICINE

## 2021-04-16 PROCEDURE — 6370000000 HC RX 637 (ALT 250 FOR IP): Performed by: SPECIALIST

## 2021-04-16 PROCEDURE — 6370000000 HC RX 637 (ALT 250 FOR IP): Performed by: CLINICAL NURSE SPECIALIST

## 2021-04-16 PROCEDURE — 97530 THERAPEUTIC ACTIVITIES: CPT

## 2021-04-16 PROCEDURE — 2500000003 HC RX 250 WO HCPCS: Performed by: FAMILY MEDICINE

## 2021-04-16 PROCEDURE — 82962 GLUCOSE BLOOD TEST: CPT

## 2021-04-16 PROCEDURE — 87635 SARS-COV-2 COVID-19 AMP PRB: CPT

## 2021-04-16 PROCEDURE — 1200000000 HC SEMI PRIVATE

## 2021-04-16 PROCEDURE — 97535 SELF CARE MNGMENT TRAINING: CPT

## 2021-04-16 RX ORDER — CALCIUM CARBONATE 200(500)MG
500 TABLET,CHEWABLE ORAL 3 TIMES DAILY PRN
Qty: 30 TABLET | Refills: 0 | Status: SHIPPED | OUTPATIENT
Start: 2021-04-16 | End: 2021-05-16

## 2021-04-16 RX ORDER — CALCIUM CARBONATE 200(500)MG
500 TABLET,CHEWABLE ORAL 3 TIMES DAILY PRN
Status: DISCONTINUED | OUTPATIENT
Start: 2021-04-16 | End: 2021-04-17 | Stop reason: HOSPADM

## 2021-04-16 RX ORDER — INSULIN GLARGINE 100 [IU]/ML
40 INJECTION, SOLUTION SUBCUTANEOUS 2 TIMES DAILY
Status: DISCONTINUED | OUTPATIENT
Start: 2021-04-16 | End: 2021-04-17 | Stop reason: HOSPADM

## 2021-04-16 RX ORDER — INSULIN GLARGINE 100 [IU]/ML
40 INJECTION, SOLUTION SUBCUTANEOUS 2 TIMES DAILY
Qty: 1 VIAL | Refills: 3 | Status: SHIPPED | OUTPATIENT
Start: 2021-04-16 | End: 2021-04-30

## 2021-04-16 RX ADMIN — TERBINAFINE 250 MG: 250 TABLET ORAL at 10:38

## 2021-04-16 RX ADMIN — IPRATROPIUM BROMIDE AND ALBUTEROL SULFATE 1 AMPULE: .5; 3 SOLUTION RESPIRATORY (INHALATION) at 11:31

## 2021-04-16 RX ADMIN — APIXABAN 5 MG: 5 TABLET, FILM COATED ORAL at 20:45

## 2021-04-16 RX ADMIN — INSULIN LISPRO 3 UNITS: 100 INJECTION, SOLUTION INTRAVENOUS; SUBCUTANEOUS at 20:57

## 2021-04-16 RX ADMIN — MICONAZOLE NITRATE: 20.6 POWDER TOPICAL at 20:47

## 2021-04-16 RX ADMIN — SPIRONOLACTONE 25 MG: 25 TABLET ORAL at 10:37

## 2021-04-16 RX ADMIN — INSULIN GLARGINE 40 UNITS: 100 INJECTION, SOLUTION SUBCUTANEOUS at 20:57

## 2021-04-16 RX ADMIN — LEVOFLOXACIN 500 MG: 500 TABLET, FILM COATED ORAL at 10:38

## 2021-04-16 RX ADMIN — GABAPENTIN 300 MG: 300 CAPSULE ORAL at 18:53

## 2021-04-16 RX ADMIN — BUMETANIDE 2 MG: 1 TABLET ORAL at 10:38

## 2021-04-16 RX ADMIN — TRAZODONE HYDROCHLORIDE 50 MG: 50 TABLET ORAL at 20:45

## 2021-04-16 RX ADMIN — INSULIN LISPRO 35 UNITS: 100 INJECTION, SOLUTION INTRAVENOUS; SUBCUTANEOUS at 18:53

## 2021-04-16 RX ADMIN — ANTI-FUNGAL POWDER MICONAZOLE NITRATE TALC FREE: 1.42 POWDER TOPICAL at 20:55

## 2021-04-16 RX ADMIN — Medication 10 ML: at 20:46

## 2021-04-16 RX ADMIN — TRAMADOL HYDROCHLORIDE 50 MG: 50 TABLET, COATED ORAL at 04:23

## 2021-04-16 RX ADMIN — INSULIN LISPRO 6 UNITS: 100 INJECTION, SOLUTION INTRAVENOUS; SUBCUTANEOUS at 06:19

## 2021-04-16 RX ADMIN — ACETAMINOPHEN 650 MG: 325 TABLET ORAL at 23:14

## 2021-04-16 RX ADMIN — DILTIAZEM HYDROCHLORIDE 240 MG: 240 CAPSULE, EXTENDED RELEASE ORAL at 10:37

## 2021-04-16 RX ADMIN — GABAPENTIN 300 MG: 300 CAPSULE ORAL at 20:45

## 2021-04-16 RX ADMIN — INSULIN LISPRO 35 UNITS: 100 INJECTION, SOLUTION INTRAVENOUS; SUBCUTANEOUS at 10:47

## 2021-04-16 RX ADMIN — TRAMADOL HYDROCHLORIDE 50 MG: 50 TABLET, COATED ORAL at 10:38

## 2021-04-16 RX ADMIN — IPRATROPIUM BROMIDE AND ALBUTEROL SULFATE 1 AMPULE: .5; 3 SOLUTION RESPIRATORY (INHALATION) at 19:57

## 2021-04-16 RX ADMIN — MONTELUKAST SODIUM 10 MG: 10 TABLET ORAL at 20:45

## 2021-04-16 RX ADMIN — PANTOPRAZOLE SODIUM 40 MG: 40 TABLET, DELAYED RELEASE ORAL at 06:15

## 2021-04-16 RX ADMIN — MICONAZOLE NITRATE: 20.6 POWDER TOPICAL at 20:54

## 2021-04-16 RX ADMIN — FERROUS SULFATE TAB 325 MG (65 MG ELEMENTAL FE) 325 MG: 325 (65 FE) TAB at 10:37

## 2021-04-16 RX ADMIN — EZETIMIBE 10 MG: 10 TABLET ORAL at 20:45

## 2021-04-16 RX ADMIN — INSULIN LISPRO 35 UNITS: 100 INJECTION, SOLUTION INTRAVENOUS; SUBCUTANEOUS at 06:23

## 2021-04-16 RX ADMIN — DOXYCYCLINE HYCLATE 100 MG: 100 CAPSULE ORAL at 10:38

## 2021-04-16 RX ADMIN — IPRATROPIUM BROMIDE AND ALBUTEROL SULFATE 1 AMPULE: .5; 3 SOLUTION RESPIRATORY (INHALATION) at 16:06

## 2021-04-16 RX ADMIN — APIXABAN 5 MG: 5 TABLET, FILM COATED ORAL at 10:38

## 2021-04-16 RX ADMIN — OMEGA-3-ACID ETHYL ESTERS 2 G: 1 CAPSULE, LIQUID FILLED ORAL at 10:37

## 2021-04-16 RX ADMIN — Medication 10 ML: at 10:38

## 2021-04-16 RX ADMIN — TRAMADOL HYDROCHLORIDE 50 MG: 50 TABLET, COATED ORAL at 20:53

## 2021-04-16 RX ADMIN — ACETAMINOPHEN 650 MG: 325 TABLET ORAL at 14:44

## 2021-04-16 RX ADMIN — INSULIN GLARGINE 40 UNITS: 100 INJECTION, SOLUTION SUBCUTANEOUS at 10:46

## 2021-04-16 RX ADMIN — ESCITALOPRAM 20 MG: 10 TABLET, FILM COATED ORAL at 10:37

## 2021-04-16 RX ADMIN — METOPROLOL SUCCINATE 100 MG: 100 TABLET, EXTENDED RELEASE ORAL at 10:37

## 2021-04-16 RX ADMIN — GABAPENTIN 300 MG: 300 CAPSULE ORAL at 10:38

## 2021-04-16 RX ADMIN — OMEGA-3-ACID ETHYL ESTERS 2 G: 1 CAPSULE, LIQUID FILLED ORAL at 20:45

## 2021-04-16 RX ADMIN — DOXYCYCLINE HYCLATE 100 MG: 100 CAPSULE ORAL at 20:45

## 2021-04-16 RX ADMIN — BUMETANIDE 2 MG: 1 TABLET ORAL at 20:45

## 2021-04-16 RX ADMIN — INSULIN LISPRO 3 UNITS: 100 INJECTION, SOLUTION INTRAVENOUS; SUBCUTANEOUS at 10:45

## 2021-04-16 RX ADMIN — CALCIUM CARBONATE 500 MG: 500 TABLET, CHEWABLE ORAL at 16:01

## 2021-04-16 ASSESSMENT — PAIN SCALES - GENERAL
PAINLEVEL_OUTOF10: 7
PAINLEVEL_OUTOF10: 5
PAINLEVEL_OUTOF10: 8

## 2021-04-16 NOTE — PROGRESS NOTES
and doxycycline, Lamisil    A. fib  Rate well controlled  Continue Eliquis for anticoagulation    COPD  No acute exacerbation suspected  Continue home dose regimen    Uncontrolled diabetes mellitus type 2  Continue current regimen  Endocrinology following    Hypertension  Blood pressure well controlled  Continue current medications    Anemia  Appears chronic and stable    Hyperlipidemia  Continue statin    Class III morbid obesity    Lymphedema of bilateral lower extremity    Obstructive sleep apnea    DISPOSITION:   [facility] precert.  Pt is medically stable to discharge otherwise     Medications:  REVIEWED DAILY    Infusion Medications    dextrose      sodium chloride       Scheduled Medications    insulin glargine  40 Units Subcutaneous BID    insulin lispro  35 Units Subcutaneous TID WC    bumetanide  2 mg Oral BID    miconazole   Topical BID    ezetimibe  10 mg Oral Nightly    levoFLOXacin  500 mg Oral Daily    doxycycline hyclate  100 mg Oral 2 times per day    insulin lispro  0-18 Units Subcutaneous 4x Daily AC & HS    apixaban  5 mg Oral BID    dilTIAZem  240 mg Oral Daily    escitalopram  20 mg Oral Daily    gabapentin  300 mg Oral TID    pantoprazole  40 mg Oral QAM AC    metoprolol succinate  100 mg Oral Daily    traZODone  50 mg Oral Nightly    montelukast  10 mg Oral Nightly    omega-3 acid ethyl esters  2 g Oral BID    spironolactone  25 mg Oral Daily    vitamin D  50,000 Units Oral Weekly    ferrous sulfate  325 mg Oral Daily with breakfast    ipratropium-albuterol  1 ampule Inhalation Q4H WA    terbinafine  250 mg Oral Daily    sodium chloride flush  5-40 mL Intravenous 2 times per day     PRN Meds: calcium carbonate, ipratropium-albuterol, miconazole, white petrolatum, glucose, dextrose, glucagon (rDNA), dextrose, traMADol, sodium chloride flush, sodium chloride, promethazine **OR** ondansetron, polyethylene glycol, acetaminophen **OR** acetaminophen    Labs:     No results for input(s): WBC, HGB, HCT, PLT in the last 72 hours. No results for input(s): NA, K, CL, CO2, BUN, CREATININE, CALCIUM, PHOS in the last 72 hours. Invalid input(s): MAGNES    No results for input(s): PROT, ALB, ALKPHOS, ALT, AST, BILITOT, AMYLASE, LIPASE in the last 72 hours. No results for input(s): INR in the last 72 hours. No results for input(s): Charissa Elder in the last 72 hours. Chronic labs:    Lab Results   Component Value Date    CHOL 355 (H) 01/16/2021    TRIG 298 (H) 01/16/2021    HDL 52 01/16/2021    LDLCALC 243 (H) 01/16/2021    TSH 0.575 01/15/2021    INR 1.1 01/12/2021    LABA1C 10.4 (H) 04/11/2021       Radiology: REVIEWED DAILY    +++++++++++++++++++++++++++++++++++++++++++++++++  Angelia Brown Physician - 2020 [facility]  +++++++++++++++++++++++++++++++++++++++++++++++++  NOTE: This report was transcribed using voice recognition software. Every effort was made to ensure accuracy; however, inadvertent computerized transcription errors may be present.

## 2021-04-16 NOTE — PROGRESS NOTES
 LAQs/APs 10x ea, GS/QS 5x ea    Patient education  Pt educated on importance of mobility, safety during functional mobility, hand placement with transfers, transfers, gait, exercise      Patient response to education:   Pt verbalized understanding Pt demonstrated skill Pt requires further education in this area   Yes  Yes with assist Yes      ASSESSMENT:    Comments:  Patient found in semi [place] and sleeping. Patient did not awaken to name, but with light touch and name did open her eyes. Patient reported too tired to participate and educated on importance of mobility and eventually agreeable. Patient required assist with trunk righting to achieve seated EOB and immediately reported moderate dizziness with positional change that resolved after several minutes in seated. Patient reported increased pain with all movement of RLE and guarded with all mobility. Patient assisted to standing and reported unable to stand due to right knee pain. Patient encouraged to side step to the [facility] while placing light weight through RLE. Patient able to perform very small side steps and not willing to ambulate forward at this time. Patient assisted back to supine and positioned to the [facility]. Call light and tray table in reach. Treatment:  Patient practiced and was instructed in the following treatment:     Bed mobility training - pt given verbal and tactile cues to facilitate proper sequencing and safety during rolling and supine<>sit as well as provided with physical assistance to complete task     Transfer training - pt educated on proper hand and foot placement, safety and sequencing, and use of WW to safely complete sit<>stand with hands on assistance to complete task safely   Gait training - verbal cues for sequencing/side stepping as well as decreasing WB to RLE to facilitate gait. PLAN:    Patient is making limited progress toward set goals. Continue with current plan of care.     Time in  0835  Time out

## 2021-04-16 NOTE — PROGRESS NOTES
ENDOCRINOLOGY PROGRESS NOTE  Via Tele-Health Service      Date of admission: 4/9/2021  Date of service: 4/15/2021  Admitting physician: Negar Modi MD   Primary Care Physician: Jagjit Arias  Consultant physician: Lord Corporal GORMAN     Reason for the consultation:  Uncontrolled DM type 2     History of Present Illness:  Services were provided through a video synchronous discussion     Vinod Keys is a 77 y.o. old female with PMH of DM type 2, lymphedema with chronic wounds, COPD,on Ox, HTN, Afib and other listed below admitted to [facility] on 4/9/2021 because of MADRID and weeping from BLE. Endocrine service was consulted for diabetes management.     Subjective  Seen this AM, no acute issue overnight, BG improving     Inpatient diet:   Carb Restricted diet     Point of care glucose monitoring (Independently reviewed)   Recent Labs     04/13/21  2015 04/14/21  0630 04/14/21  1153 04/14/21  1744 04/14/21  2034 04/15/21  0638 04/15/21  1050 04/15/21  1643   GLUMET 171* 161* 188* 110* 162* 151* 84 218*     Scheduled Meds:   insulin lispro  35 Units Subcutaneous TID WC    bumetanide  2 mg Oral BID    miconazole   Topical BID    insulin glargine  58 Units Subcutaneous BID    ezetimibe  10 mg Oral Nightly    levoFLOXacin  500 mg Oral Daily    doxycycline hyclate  100 mg Oral 2 times per day    insulin lispro  0-18 Units Subcutaneous 4x Daily AC & HS    apixaban  5 mg Oral BID    dilTIAZem  240 mg Oral Daily    escitalopram  20 mg Oral Daily    gabapentin  300 mg Oral TID    pantoprazole  40 mg Oral QAM AC    metoprolol succinate  100 mg Oral Daily    traZODone  50 mg Oral Nightly    montelukast  10 mg Oral Nightly    omega-3 acid ethyl esters  2 g Oral BID    spironolactone  25 mg Oral Daily    vitamin D  50,000 Units Oral Weekly    ferrous sulfate  325 mg Oral Daily with breakfast    ipratropium-albuterol  1 ampule Inhalation Q4H WA    terbinafine  250 mg Oral Daily    sodium chloride flush  5-40 mL CL 91*   CO2 41*   BUN 19   CREATININE 0.8   GLUCOSE 172*   CALCIUM 9.1     Beta-Hydroxybutyrate   Date Value Ref Range Status   11/17/2020 1.73 (H) 0.02 - 0.27 mmol/L Final   05/29/2020 0.18 0.02 - 0.27 mmol/L Final   05/28/2020 0.04 0.02 - 0.27 mmol/L Final     Lab Results   Component Value Date    LABA1C 10.4 04/11/2021    LABA1C 10.2 01/13/2021    LABA1C 15.7 11/17/2020     Lab Results   Component Value Date/Time    TSH 0.575 01/15/2021 09:04 AM    T4FREE 1.05 01/15/2021 09:04 AM     Lab Results   Component Value Date    LABA1C 10.4 04/11/2021    GLUCOSE 172 04/13/2021    GLUCOSE 181 12/16/2011    MALBCR <30 05/16/2018    LABMICR <12.0 05/12/2017    LABCREA 11 05/26/2018     Lab Results   Component Value Date    TRIG 298 01/16/2021    HDL 52 01/16/2021    LDLCALC 243 01/16/2021    CHOL 355 01/16/2021       Blood culture   Lab Results   Component Value Date    BC 5 Days no growth 04/09/2021    BC 5 Days no growth 08/14/2020       Radiology:  NM BONE SCAN 3 PHASE   Final Result   At the level the coccyx there is no convincing increased uptake. This   is most convincing on the lateral images. Please correlate clinically,   the lesion questioned on the previous CT interpretation may be below   the resolution of bone scan      CT PELVIS WO CONTRAST Additional Contrast? None   Final Result   Nonspecific soft tissue density anterior to the tip of the coccyx with   possible erosive changes involving the anterior cortex of the distal   coccygeal segment. This is best demonstrated on axial image 85. Nuclear   medicine bone scan may be helpful to further characterize if clinically   warranted. XR CHEST PORTABLE   Final Result   Cardiomegaly with pulmonary vascular congestion and developing interstitial   pulmonary edema or pneumonia throughout right lung.              Medical Records/Labs/Images review:   I personally reviewed and summarized previous records   All labs and imaging were reviewed independently Chris Preston, a 77 y.o.-old female seen today for inpatient diabetes management     Diabetes Mellitus type 2  · Uncontrolled, A1c 15.7%  · Will change insulin regimen to    ? lantus 40 units BID   ? Humalog 35 units with meals   ? High dose sliding scale with meals and at nigth   · Continue glucose check with meals and at bedtime   · Will titrate insulin dose based on the blood glucose trend & insulin requirement     Rt adrenal mass  · CT adrenal  5/30/2020 --> high pre-contrast Hounsfield units (34) with late phase washout 49 Hounsfield units makings lesion undetermined. This lesion has increased size when comparison is made with previous study of April 2019. Increased size of the right adrenal gland lesion up to 3.4 x 3 cm, can be of can indicate a suspicious lesion. · Previous endocrine work up 5/2020 showed normal plasma metanephrines but indeterminate 1 mg DST   · Late night saliva cortisol and 24 hrs urine free cortisol and in process   · Pt on Spironolactone and for this reason we can't do Renin/Patel   · If work up was negative for Cushing's. Pt will need biopsy of this adrenal lesion     Recurrent Cellulitis LE   · Management per ID  · Discussed the importance of controlling DM in management of LE wounds     Afib/acute on chronic CHF  · Cardiology on board    The above issues were reviewed with the patient who understood and agreed with the plan. Thank you for allowing us to participate in the care of this patient. Please do not hesitate to contact us with any additional questions. Lord Corporal GORMAN  Endocrinologist, [facility] Diabetes Care and Endocrinology   [address] [id number]   Phone: [phone]  Fax: [phone]  -------------------------  An electronic signature was used to authenticate this note.  Cynthia Mitchell MD on 4/15/2021 at 8:53 PM    This visit was performed as a virtual video visit using a synchronous, two-way, audio-video telehealth technology platform

## 2021-04-16 NOTE — PROGRESS NOTES
OT BEDSIDE TREATMENT NOTE      Date:2021  Patient Name: Chandni Miller  MRN: [id number]  : 1955  Room: [address] [place], [place] [address]     Per OT Eval:    Evaluating OT:  JUDY Arrieta, OTR/L #[id number]     AM-PAC Daily Activity Raw Score: 15/24  Recommended Adaptive Equipment:  TBD as pt progresses - Extended Tub Bench, Adaptive equipment     Reason for Admission:  Pt was admitted w/ SOB, Redness/swelling R LE  Diagnosis:  Cellulitis L LE, CHF, Erosion of Bone  Procedures this admission:  None   Pertinent Medical History:  Depression/Anxiety, Arthritis, Asthma, CHF, COPD, DM, Hx of Blood Clots, HTN, Lymphedema, L TKR, R LE Fx/surgical repair     Precautions:  Falls, R LE/swelling/limited knee ext, 3L O2, Hansen Catheter     Home Living: Pt lives with her  and 2 adult grandsons in a 2-story house, stair-glide to 2nd floor. Sleeps in [address] on [address]st floor, uses BSC and sponge bathes. Bathroom setup:  Walk-in-Shower, Standard-height Commode   Equipment owned:  W/C, Rollator, BSC, Home O2 2L, Lift Chair     Available Family Assist:  Family can provide 24/7 assist     Prior Level of Function:  Pt reported receiving assist w/ Hair Care, LB dressing, Toileting/Hygiene and Sponge bathing, Mod I for Transfers and Mobility using Rollator for household ambulation. W/C for outings - appts only.   Family members complete all IADLs  Driving:  No  Occupation:  None     Pain Level: BLEs     Cognition: A & O x 3 - cues for day/date        Able to Follow Multi-Step Commands w/ Min VCs for safety              Memory:  fair (+)              Sequencing:  fair (+)              Problem solving:  fair               Judgement/safety:  fair   Additional Comments:  Pt was pleasant, cooperative, Mod encouragement for minimal Ax                    Functional Assessment:    Initial Eval Status  Date: 21 Treatment Status  Date: 21 Short Term/Long Term Goals  Treatment frequency: PRN 1-3 x/week   1-2 weeks   Feeding Set up     Able to feed self, assist to open containers/set up tray Set-up (assist with position to [facility], pt able to self feed) NA   Grooming Mod A     SUP/Set up to wash hands/face, Mod A for hair care - seated in chair     Unable to tolerate ambulating to or standing at sink    SBA (pt able to complete oral hygiene and hair grooming seated EOB) Min A  Standing At The Sink   UB Dressing Mod A     To don robe after set up seated in chair    Min A (due to limited ROM) Min A   LB Dressing Max A     Max A to don socks seated   Mod A for dynamic standing balance + Max A for simulated clothing adjustment over hips     Pt ed for safety, adaptive techs/equip    Max A (assist with socks) Mod A   Bathing NT       Max A (sponge bath seated/standing EOB, assist with B fee and balance for gurmeet areas) Mod A   Toileting Max A     Hansen Catheter  Mod A for dynamic standing balance + Max A for simulated clothing adjustment over hips    Max A (assist with gurmeet hygiene bed level) Mod A   Bed Mobility  Rolling: Mod A  Repositioning:  Max A   Supine to Sit:  NT    Sit to Supine: Mod A      Assist to lift Randal LEs  SBA (with bed flat) Min A   Functional Transfers Sit to stand: Mod A  Stand to sit:  Min A       Mod A to stand from Chair, EOB  Pt ed re: safety/hand placement    Sit-stand: Max A Min A   Functional Mobility Mod A     Mod A w/ [employer] b/t surfaces only, VCs for improved safety    NT (due to safety) Min A   Balance Sitting:      Static:  IND in chair, SUP EOB    Dynamic:  Close SUP w/ functional ax EOB     Standing:      Static:  Min A w/ [employer]    Dynamic:  Mod A w/ functional ax/mobility w/ [employer]    Sitting: SBA   standing: Max A     Activity Tolerance Fair  Limited by General Weakness/fatigue, Pain/limited ROM R LE     Tolerated Sitting:   In chair extended time, EOB ~ 10 mins w/ functional ax      Tolerated Standing:  ~ 2-3 mins w/ functional ax/mobility     Fair+     Visual/  Perceptual WFL  Glasses:  Reading        Hearing WFL  Hearing Aids  No          Hand dominance: Right  UE ROM:        RUE:     WFL                                          LUE:     WFL  Strength:        RUE:    grossly 4/5                                 LUE:    grossly 4/5      Strength:  WFL Randal UEs  Fine Motor Coordination:  WFL Randal UEs    Treatment/Comments: Cleared by RN to see pt. Upon arrival pt supine in bed and agreeable to OT session. Pt required vc's and physical assist for proper technique/safety with hand placement/body mechanics/posture for bed mobility/ADLs/functional tranfers. Pt required vc's for sequencing/initiation of ADLs/functional transfers. Pt able to sit EOB ~20 mins to increase core strength/balance/activity tolerance for ease with ADLs. Pt required increased time to complete ADLs/functional transfers due to management of multiple lines. Pt required skilled monitoring of SpO2 during session and education on energy conservation techniques. Pt required rest breaks. SpO2 >93% with activity. Pt appeared to have tolerated session well and appears motivated/cooperative/pleasant . Pt instructed on use of call light for assistance and fall prevention. Pt demo'ing fair understanding of education provided. Continue to educate. At end of session, pt supine in bed, all lines and tubes intact, call light within reach, wound care present. · Pt has made fair progress towards set goals.    · Continue with current plan of care focusing on increasing of independency with transfers and ADL tasks      Treatment Time In:  12:40          Treatment Time Out: 1:10          Treatment Charges: Mins Units   Ther Ex  48024     Manual Therapy 76010     Thera Activities 75244     ADL/Home Mgt 93678 30 2   Neuro Re-ed 77436     Group Therapy      Orthotic manage/training  48746     Non-Billable Time     Total Timed Treatment 30 [address], [place], OTR/L [address]102

## 2021-04-16 NOTE — CARE COORDINATION
Social Work Discharge/Planning:    Golden Fitzpatrick spoke with [address] [facility] with [employer] who reports precert not obtained yet. Awaiting auth. Hens, cuongette, and envelope are on softchart. SW/CM to follow.     Cathy Avelar, LSW  [phone]

## 2021-04-16 NOTE — PROGRESS NOTES
[address] [place] [address] [facility]  DARLYNIDA  Progress Note    CC: leg pain / cellulitis   Face to face encounter   SUBJECTIVE:  Tolerating PO  Patient is tolerating medications. No reported adverse drug reactions. ROS: No nausea, vomiting, diarrhea. No fever. Tenderness to lower extremities. Right knee pain that is chronic.     Medications:  Scheduled Meds:   insulin glargine  40 Units Subcutaneous BID    insulin lispro  35 Units Subcutaneous TID WC    bumetanide  2 mg Oral BID    miconazole   Topical BID    ezetimibe  10 mg Oral Nightly    levoFLOXacin  500 mg Oral Daily    doxycycline hyclate  100 mg Oral 2 times per day    insulin lispro  0-18 Units Subcutaneous 4x Daily AC & HS    apixaban  5 mg Oral BID    dilTIAZem  240 mg Oral Daily    escitalopram  20 mg Oral Daily    gabapentin  300 mg Oral TID    pantoprazole  40 mg Oral QAM AC    metoprolol succinate  100 mg Oral Daily    traZODone  50 mg Oral Nightly    montelukast  10 mg Oral Nightly    omega-3 acid ethyl esters  2 g Oral BID    spironolactone  25 mg Oral Daily    vitamin D  50,000 Units Oral Weekly    ferrous sulfate  325 mg Oral Daily with breakfast    ipratropium-albuterol  1 ampule Inhalation Q4H WA    terbinafine  250 mg Oral Daily    sodium chloride flush  5-40 mL Intravenous 2 times per day     Continuous Infusions:   dextrose      sodium chloride       PRN Meds:ipratropium-albuterol, miconazole, white petrolatum, glucose, dextrose, glucagon (rDNA), dextrose, traMADol, sodium chloride flush, sodium chloride, promethazine **OR** ondansetron, polyethylene glycol, acetaminophen **OR** acetaminophen  OBJECTIVE:  Patient Vitals for the past 24 hrs:   BP Temp Temp src Pulse Resp SpO2 Weight   04/16/21 0945 (!) 131/58 98.6 °F (37 °C) Oral 77 -- 95 % --   04/16/21 0615 -- -- -- -- -- -- 272 lb 14.4 oz (123.8 kg)   04/15/21 2016 (!) 124/57 97.1 °F (36.2 °C) Temporal 82 17 96 % --   04/15/21 1615 (!) 103/55 97.6 °F (36.4 BILITOT <0.2 04/09/2021    ALKPHOS 90 04/09/2021    AST 8 04/09/2021    ALT 7 04/09/2021     Radiology:  Reviewed     Microbiology:   Blood cx- no growth to date     ASSESSMENT:  · Recurrent cellulitis with chronic lymphedema  · Onychomycosis/ tinea pedis   · Pain to coccyx area     PLAN:  · Po levaquin and doxycycline for preparation for discharge-med rec complete  · Bone scan reviewed  · Continue lamisil   · Check EKG in am SR, ,   · Monitor labs- reviewed   · Continue wound care     Ezequiel Certain  10:21 AM  4/16/2021   Pt seen and examined. Above discussed agree with advanced practice nurse. Labs, cultures, and radiographs reviewed. Face to Face encounter occurred. Changes made as necessary.      Raymond De Leon MD

## 2021-04-16 NOTE — PROGRESS NOTES
ENDOCRINOLOGY PROGRESS NOTE      Date of admission: 4/9/2021  Date of service: 4/16/2021  Admitting physician: Alex Anthony MD   Primary Care Physician: Renetta Rousseau  Consultant physician: Jarrett John MD     Reason for the consultation:  Uncontrolled DM type 2     History of Present Illness:  Services were provided through a video synchronous discussion     Kinga Grimes is a 77 y.o. old female with PMH of DM type 2, lymphedema with chronic wounds, COPD,on Ox, HTN, Afib and other listed below admitted to [facility] on 4/9/2021 because of MADRID and weeping from BLE. Endocrine service was consulted for diabetes management.     Subjective  Seen this AM, no acute issue overnight, BG improving     Inpatient diet:   Carb Restricted diet     Point of care glucose monitoring (Independently reviewed)   Recent Labs     04/14/21  1153 04/14/21  1744 04/14/21  2034 04/15/21  0638 04/15/21  1050 04/15/21  1643 04/15/21  2103 04/16/21  0618   GLUMET 188* 110* 162* 151* 84 218* 183* 201*     Scheduled Meds:   insulin glargine  40 Units Subcutaneous BID    insulin lispro  35 Units Subcutaneous TID WC    bumetanide  2 mg Oral BID    miconazole   Topical BID    ezetimibe  10 mg Oral Nightly    levoFLOXacin  500 mg Oral Daily    doxycycline hyclate  100 mg Oral 2 times per day    insulin lispro  0-18 Units Subcutaneous 4x Daily AC & HS    apixaban  5 mg Oral BID    dilTIAZem  240 mg Oral Daily    escitalopram  20 mg Oral Daily    gabapentin  300 mg Oral TID    pantoprazole  40 mg Oral QAM AC    metoprolol succinate  100 mg Oral Daily    traZODone  50 mg Oral Nightly    montelukast  10 mg Oral Nightly    omega-3 acid ethyl esters  2 g Oral BID    spironolactone  25 mg Oral Daily    vitamin D  50,000 Units Oral Weekly    ferrous sulfate  325 mg Oral Daily with breakfast    ipratropium-albuterol  1 ampule Inhalation Q4H WA    terbinafine  250 mg Oral Daily    sodium chloride flush  5-40 mL Intravenous 2 times per day     PRN Meds:   ipratropium-albuterol, 1 vial, Q6H PRN  miconazole, , BID PRN  white petrolatum, 30 mL, TID PRN  glucose, 15 g, PRN  dextrose, 12.5 g, PRN  glucagon (rDNA), 1 mg, PRN  dextrose, 100 mL/hr, PRN  traMADol, 50 mg, Q6H PRN  sodium chloride flush, 5-40 mL, PRN  sodium chloride, 25 mL, PRN  promethazine, 12.5 mg, Q6H PRN    Or  ondansetron, 4 mg, Q6H PRN  polyethylene glycol, 17 g, Daily PRN  acetaminophen, 650 mg, Q6H PRN    Or  acetaminophen, 650 mg, Q6H PRN      Continuous Infusions:   dextrose      sodium chloride         Review of Systems  All systems reviewed. All negative except for symptoms mentioned in HPI     OBJECTIVE    BP (!) 131/58   Pulse 77   Temp 98.6 °F (37 °C) (Oral)   Resp 17   Ht 5' 3\" (1.6 m)   Wt 272 lb 14.4 oz (123.8 kg)   LMP  (LMP Unknown)   SpO2 95%   BMI 48.34 kg/m²     Intake/Output Summary (Last 24 hours) at 4/16/2021 1020  Last data filed at 4/16/2021 0608  Gross per 24 hour   Intake --   Output 4225 ml   Net -4225 ml     Physical examination:  Due to this being a TeleHealth encounter, evaluation of the following organ systems is limited: Vitals/Constitutional/EENT/Resp/CV/GI//MS/Neuro/Skin/Heme-Lymph-Imm. Modified physical exam through Telemedicine camera    General: Communicating well via camera   Neck: no obvious neck mass. No obvious neck deformity     CVS: no distress   Chest: no distress. Chest is moving with respiration    Extremities:  no visible tremor  Skin: + visible skin changes in LE bilateral   Musculoskeletal: no visible deformity  Neuro: Alert and oriented to person, place, and time. Psychiatric: Normal mood and affect. Behavior is normal    Review of Laboratory Data:  I personally reviewed the following labs:   No results for input(s): WBC, RBC, HGB, HCT, MCV, MCH, MCHC, RDW, PLT, MPV in the last 72 hours.   No results for input(s): NA, K, CL, CO2, BUN, CREATININE, GLUCOSE, CALCIUM, PROT, LABALBU, BILITOT, ALKPHOS, AST, ALT in the last 72 hours. Beta-Hydroxybutyrate   Date Value Ref Range Status   11/17/2020 1.73 (H) 0.02 - 0.27 mmol/L Final   05/29/2020 0.18 0.02 - 0.27 mmol/L Final   05/28/2020 0.04 0.02 - 0.27 mmol/L Final     Lab Results   Component Value Date    LABA1C 10.4 04/11/2021    LABA1C 10.2 01/13/2021    LABA1C 15.7 11/17/2020     Lab Results   Component Value Date/Time    TSH 0.575 01/15/2021 09:04 AM    T4FREE 1.05 01/15/2021 09:04 AM     Lab Results   Component Value Date    LABA1C 10.4 04/11/2021    GLUCOSE 172 04/13/2021    GLUCOSE 181 12/16/2011    MALBCR <30 05/16/2018    LABMICR <12.0 05/12/2017    LABCREA 11 05/26/2018     Lab Results   Component Value Date    TRIG 298 01/16/2021    HDL 52 01/16/2021    LDLCALC 243 01/16/2021    CHOL 355 01/16/2021       Blood culture   Lab Results   Component Value Date    BC 5 Days no growth 04/09/2021    BC 5 Days no growth 08/14/2020       Radiology:  NM BONE SCAN 3 PHASE   Final Result   At the level the coccyx there is no convincing increased uptake. This   is most convincing on the lateral images. Please correlate clinically,   the lesion questioned on the previous CT interpretation may be below   the resolution of bone scan      CT PELVIS WO CONTRAST Additional Contrast? None   Final Result   Nonspecific soft tissue density anterior to the tip of the coccyx with   possible erosive changes involving the anterior cortex of the distal   coccygeal segment. This is best demonstrated on axial image 85. Nuclear   medicine bone scan may be helpful to further characterize if clinically   warranted. XR CHEST PORTABLE   Final Result   Cardiomegaly with pulmonary vascular congestion and developing interstitial   pulmonary edema or pneumonia throughout right lung.              Medical Records/Labs/Images review:   I personally reviewed and summarized previous records   All labs and imaging were reviewed independently     Chris 88, a 77 y.o.-old female seen today for inpatient diabetes management     Diabetes Mellitus type 2  · Uncontrolled, A1c 15.7%  · Will change insulin regimen to    ? lantus 40 units BID   ? Humalog 35 units with meals   ? High dose sliding scale with meals and at nigth   · Continue glucose check with meals and at bedtime   · Will titrate insulin dose based on the blood glucose trend & insulin requirement     Rt adrenal mass  · CT adrenal  5/30/2020 --> high pre-contrast Hounsfield units (34) with late phase washout 49 Hounsfield units makings lesion undetermined. This lesion has increased size when comparison is made with previous study of April 2019. Increased size of the right adrenal gland lesion up to 3.4 x 3 cm, can be of can indicate a suspicious lesion. · Previous endocrine work up 5/2020 showed normal plasma metanephrines but indeterminate 1 mg DST   · Late night saliva cortisol and 24 hrs urine free cortisol and in process   · Pt on Spironolactone and for this reason we can't do Renin/Patel   · If work up was negative for Cushing's. Pt will need biopsy of this adrenal lesion     Recurrent Cellulitis LE   · Management per ID  · Discussed the importance of controlling DM in management of LE wounds     Afib/acute on chronic CHF  · Cardiology on board    The above issues were reviewed with the patient who understood and agreed with the plan. Thank you for allowing us to participate in the care of this patient. Please do not hesitate to contact us with any additional questions. Kalie Jay MD  Endocrinologist, [facility] Diabetes Care and Endocrinology   [address] [place] [address]   Phone: [phone]  Fax: [phone]  -------------------------  An electronic signature was used to authenticate this note.  Shelby Brown MD on 4/16/2021 at 10:20 AM

## 2021-04-17 VITALS
HEART RATE: 65 BPM | SYSTOLIC BLOOD PRESSURE: 130 MMHG | OXYGEN SATURATION: 96 % | WEIGHT: 272.88 LBS | TEMPERATURE: 97.7 F | DIASTOLIC BLOOD PRESSURE: 72 MMHG | HEIGHT: 63 IN | RESPIRATION RATE: 14 BRPM | BODY MASS INDEX: 48.35 KG/M2

## 2021-04-17 LAB
ANION GAP SERPL CALCULATED.3IONS-SCNC: 7 MMOL/L (ref 7–16)
B.E.: 26.7 MMOL/L (ref -3–3)
BUN BLDV-MCNC: 26 MG/DL (ref 8–23)
CALCIUM SERPL-MCNC: 9.2 MG/DL (ref 8.6–10.2)
CHLORIDE BLD-SCNC: 83 MMOL/L (ref 98–107)
CO2: 46 MMOL/L (ref 22–29)
COHB: 1.2 % (ref 0–1.5)
CREAT SERPL-MCNC: 0.9 MG/DL (ref 0.5–1)
CRITICAL: ABNORMAL
DATE ANALYZED: ABNORMAL
DATE OF COLLECTION: ABNORMAL
GFR AFRICAN AMERICAN: >60
GFR NON-AFRICAN AMERICAN: >60 ML/MIN/1.73
GLUCOSE BLD-MCNC: 300 MG/DL (ref 74–99)
HCO3: 55.7 MMOL/L (ref 22–26)
HCT VFR BLD CALC: 37 % (ref 34–48)
HEMOGLOBIN: 10.8 G/DL (ref 11.5–15.5)
HHB: 6.3 % (ref 0–5)
LAB: ABNORMAL
Lab: ABNORMAL
MCH RBC QN AUTO: 23.7 PG (ref 26–35)
MCHC RBC AUTO-ENTMCNC: 29.2 % (ref 32–34.5)
MCV RBC AUTO: 81.3 FL (ref 80–99.9)
METER GLUCOSE: 149 MG/DL (ref 74–99)
METER GLUCOSE: 323 MG/DL (ref 74–99)
METHB: 0.2 % (ref 0–1.5)
MODE: ABNORMAL
O2 CONTENT: 14.6 ML/DL
O2 SATURATION: 93.6 % (ref 92–98.5)
O2HB: 92.3 % (ref 94–97)
OPERATOR ID: 405
PATIENT TEMP: 37 C
PCO2: 85.7 MMHG (ref 35–45)
PDW BLD-RTO: 15.3 FL (ref 11.5–15)
PH BLOOD GAS: 7.43 (ref 7.35–7.45)
PLATELET # BLD: 259 E9/L (ref 130–450)
PMV BLD AUTO: 8.8 FL (ref 7–12)
PO2: 70.8 MMHG (ref 75–100)
POTASSIUM SERPL-SCNC: 4.3 MMOL/L (ref 3.5–5)
RBC # BLD: 4.55 E12/L (ref 3.5–5.5)
SODIUM BLD-SCNC: 136 MMOL/L (ref 132–146)
SOURCE, BLOOD GAS: ABNORMAL
THB: 11.2 G/DL (ref 11.5–16.5)
TIME ANALYZED: 1237
WBC # BLD: 5.1 E9/L (ref 4.5–11.5)

## 2021-04-17 PROCEDURE — 36600 WITHDRAWAL OF ARTERIAL BLOOD: CPT

## 2021-04-17 PROCEDURE — 6370000000 HC RX 637 (ALT 250 FOR IP): Performed by: EMERGENCY MEDICINE

## 2021-04-17 PROCEDURE — 6370000000 HC RX 637 (ALT 250 FOR IP): Performed by: FAMILY MEDICINE

## 2021-04-17 PROCEDURE — 51702 INSERT TEMP BLADDER CATH: CPT

## 2021-04-17 PROCEDURE — 36415 COLL VENOUS BLD VENIPUNCTURE: CPT

## 2021-04-17 PROCEDURE — 2580000003 HC RX 258: Performed by: EMERGENCY MEDICINE

## 2021-04-17 PROCEDURE — 6370000000 HC RX 637 (ALT 250 FOR IP): Performed by: CLINICAL NURSE SPECIALIST

## 2021-04-17 PROCEDURE — 2700000000 HC OXYGEN THERAPY PER DAY

## 2021-04-17 PROCEDURE — 82805 BLOOD GASES W/O2 SATURATION: CPT

## 2021-04-17 PROCEDURE — 85027 COMPLETE CBC AUTOMATED: CPT

## 2021-04-17 PROCEDURE — 82962 GLUCOSE BLOOD TEST: CPT

## 2021-04-17 PROCEDURE — 6370000000 HC RX 637 (ALT 250 FOR IP): Performed by: SPECIALIST

## 2021-04-17 PROCEDURE — 94660 CPAP INITIATION&MGMT: CPT

## 2021-04-17 PROCEDURE — 80048 BASIC METABOLIC PNL TOTAL CA: CPT

## 2021-04-17 PROCEDURE — 94640 AIRWAY INHALATION TREATMENT: CPT

## 2021-04-17 PROCEDURE — 99232 SBSQ HOSP IP/OBS MODERATE 35: CPT | Performed by: INTERNAL MEDICINE

## 2021-04-17 PROCEDURE — 6370000000 HC RX 637 (ALT 250 FOR IP): Performed by: INTERNAL MEDICINE

## 2021-04-17 RX ADMIN — DOXYCYCLINE HYCLATE 100 MG: 100 CAPSULE ORAL at 08:45

## 2021-04-17 RX ADMIN — PANTOPRAZOLE SODIUM 40 MG: 40 TABLET, DELAYED RELEASE ORAL at 06:37

## 2021-04-17 RX ADMIN — METOPROLOL SUCCINATE 100 MG: 100 TABLET, EXTENDED RELEASE ORAL at 08:45

## 2021-04-17 RX ADMIN — LEVOFLOXACIN 500 MG: 500 TABLET, FILM COATED ORAL at 08:44

## 2021-04-17 RX ADMIN — ACETAMINOPHEN 650 MG: 325 TABLET ORAL at 10:48

## 2021-04-17 RX ADMIN — SPIRONOLACTONE 25 MG: 25 TABLET ORAL at 08:45

## 2021-04-17 RX ADMIN — TRAMADOL HYDROCHLORIDE 50 MG: 50 TABLET, COATED ORAL at 12:12

## 2021-04-17 RX ADMIN — BUMETANIDE 2 MG: 1 TABLET ORAL at 08:44

## 2021-04-17 RX ADMIN — IPRATROPIUM BROMIDE AND ALBUTEROL SULFATE 1 AMPULE: .5; 3 SOLUTION RESPIRATORY (INHALATION) at 09:45

## 2021-04-17 RX ADMIN — TERBINAFINE 250 MG: 250 TABLET ORAL at 08:45

## 2021-04-17 RX ADMIN — INSULIN LISPRO 12 UNITS: 100 INJECTION, SOLUTION INTRAVENOUS; SUBCUTANEOUS at 12:13

## 2021-04-17 RX ADMIN — FERROUS SULFATE TAB 325 MG (65 MG ELEMENTAL FE) 325 MG: 325 (65 FE) TAB at 08:45

## 2021-04-17 RX ADMIN — IPRATROPIUM BROMIDE AND ALBUTEROL SULFATE 1 AMPULE: .5; 3 SOLUTION RESPIRATORY (INHALATION) at 12:20

## 2021-04-17 RX ADMIN — DILTIAZEM HYDROCHLORIDE 240 MG: 240 CAPSULE, EXTENDED RELEASE ORAL at 08:44

## 2021-04-17 RX ADMIN — INSULIN LISPRO 35 UNITS: 100 INJECTION, SOLUTION INTRAVENOUS; SUBCUTANEOUS at 12:15

## 2021-04-17 RX ADMIN — GABAPENTIN 300 MG: 300 CAPSULE ORAL at 14:50

## 2021-04-17 RX ADMIN — Medication 10 ML: at 08:43

## 2021-04-17 RX ADMIN — ANTI-FUNGAL POWDER MICONAZOLE NITRATE TALC FREE: 1.42 POWDER TOPICAL at 10:48

## 2021-04-17 RX ADMIN — INSULIN GLARGINE 40 UNITS: 100 INJECTION, SOLUTION SUBCUTANEOUS at 08:43

## 2021-04-17 RX ADMIN — APIXABAN 5 MG: 5 TABLET, FILM COATED ORAL at 08:45

## 2021-04-17 RX ADMIN — IPRATROPIUM BROMIDE AND ALBUTEROL SULFATE 1 AMPULE: .5; 3 SOLUTION RESPIRATORY (INHALATION) at 16:23

## 2021-04-17 RX ADMIN — TRAMADOL HYDROCHLORIDE 50 MG: 50 TABLET, COATED ORAL at 05:24

## 2021-04-17 RX ADMIN — ESCITALOPRAM 20 MG: 10 TABLET, FILM COATED ORAL at 08:44

## 2021-04-17 RX ADMIN — OMEGA-3-ACID ETHYL ESTERS 2 G: 1 CAPSULE, LIQUID FILLED ORAL at 08:44

## 2021-04-17 RX ADMIN — GABAPENTIN 300 MG: 300 CAPSULE ORAL at 08:45

## 2021-04-17 ASSESSMENT — PAIN DESCRIPTION - PAIN TYPE: TYPE: CHRONIC PAIN

## 2021-04-17 ASSESSMENT — PAIN DESCRIPTION - PROGRESSION
CLINICAL_PROGRESSION: GRADUALLY WORSENING
CLINICAL_PROGRESSION: GRADUALLY WORSENING

## 2021-04-17 ASSESSMENT — PAIN SCALES - GENERAL
PAINLEVEL_OUTOF10: 8
PAINLEVEL_OUTOF10: 6

## 2021-04-17 NOTE — PROGRESS NOTES
ABG drawn x 2 from Left Radial. Patient had NormalAllen's Test.  Patient was on **3* liters/min via nasal cannula  at time of puncture. Pressure held for 5. No bleeding or bruising noted at puncture site.   Patient tolerated procedure well      Performed by Shari Ferguson

## 2021-04-17 NOTE — PLAN OF CARE
Problem: Pain:  Goal: Pain level will decrease  Description: Pain level will decrease  4/17/2021 1046 by Ray Cole RN  Outcome: Met This Shift  4/17/2021 0021 by Mustapha Nagel RN  Outcome: Met This Shift  Goal: Control of acute pain  Description: Control of acute pain  4/17/2021 1046 by Rya Cole RN  Outcome: Met This Shift  4/17/2021 0021 by Mustapha Nagel RN  Outcome: Met This Shift  Goal: Control of chronic pain  Description: Control of chronic pain  4/17/2021 1046 by Ray Cole RN  Outcome: Met This Shift  4/17/2021 0021 by Mustapha Nagel RN  Outcome: Met This Shift

## 2021-04-17 NOTE — CARE COORDINATION
Auth obtained for [facility], they have ordered bipap and it will be available for the patient to use tonight. Facility is arranging wheelchair transport for 4pm. Updated patient on discharge. [employer]ide Alert notified of discharge by voicemail. Nursing to send bipap mask with patient to facility. Ismael Lackey called back unable to transport until 6pm.     For questions I can be reached at [phone].  Luther Hamper, Malen Aschoff

## 2021-04-17 NOTE — PROGRESS NOTES
Constitutional: The patient is awake, alert, and oriented. Up on bedside commode   Skin: Warm and dry. No rashes were noted. Head: Eyes show round, and reactive pupils. No jaundice. Mouth: Moist mucous membranes, no ulcerations, no thrush. Neck: Supple to movements. No lymphadenopathy. Chest: No use of accessory muscles to breathe. Symmetrical expansion. Auscultation reveals no wheezing, crackles, or rhonchi. Cardiovascular: S1 and S2 are rhythmic and regular. No murmurs appreciated. Abdomen: Positive bowel sounds to auscultation. Benign to palpation. Large  Extremities: ++lymphedema. ++ edema.  Bilateral legs  left leg with more erythema and tenderness than right improving  PIV              bilat leg wrapped today      Laboratory and Tests Review:  Lab Results   Component Value Date    WBC 5.1 04/17/2021    WBC 6.1 04/13/2021    WBC 6.6 04/11/2021    HGB 10.8 (L) 04/17/2021    HCT 37.0 04/17/2021    MCV 81.3 04/17/2021     04/17/2021     Lab Results   Component Value Date    NEUTROABS 4.77 04/11/2021    NEUTROABS 5.79 04/09/2021    NEUTROABS 6.57 01/17/2021     Lab Results   Component Value Date    CRP 14.0 (H) 05/29/2020    CRP 2.2 (H) 12/24/2019    CRP 2.0 (H) 09/22/2019     Lab Results   Component Value Date    SEDRATE 75 (H) 05/29/2020    SEDRATE 41 (H) 12/24/2019    SEDRATE 38 (H) 09/22/2019     Lab Results   Component Value Date    ALT 7 04/09/2021    AST 8 04/09/2021    ALKPHOS 90 04/09/2021    BILITOT <0.2 04/09/2021     Lab Results   Component Value Date     04/17/2021    K 4.3 04/17/2021    K 3.9 04/13/2021    CL 83 04/17/2021    CO2 46 04/17/2021    BUN 26 04/17/2021    CREATININE 0.9 04/17/2021    GFRAA >60 04/17/2021    LABGLOM >60 04/17/2021    GLUCOSE 300 04/17/2021    GLUCOSE 181 12/16/2011    PROT 6.1 04/09/2021    LABALBU 3.1 04/09/2021    LABALBU 4.5 11/02/2011    CALCIUM 9.2 04/17/2021    BILITOT <0.2 04/09/2021    ALKPHOS 90 04/09/2021    AST 8 04/09/2021    ALT 7 04/09/2021     Radiology:  Reviewed     Microbiology:   Blood cx- no growth to date     ASSESSMENT:  · Recurrent cellulitis legs  · Chronic lymphedema  · Onychomycosis/ tinea pedis   · Pain to coccyx area     PLAN:  · Po levaquin 500 mg po q 24 hrs and doxycycline 100 mg po q 12 hrs - med rec complete  · Bone scan reviewed  · Continue lamisil   · Continue wound care     Marquis Philippe  1:43 PM  4/17/2021

## 2021-04-17 NOTE — PROGRESS NOTES
ENDOCRINOLOGY PROGRESS NOTE      Date of admission: 4/9/2021  Date of service: 4/17/2021  Admitting physician: Keyana Garcia MD   Primary Care Physician: Aramis Kam  Consultant physician: Claudeen Chaco MD     Reason for the consultation:  Uncontrolled DM type 2     History of Present Illness:  Services were provided through a video synchronous discussion     Ernie Sanchez is a 77 y.o. old female with PMH of DM type 2, lymphedema with chronic wounds, COPD,on Ox, HTN, Afib and other listed below admitted to [facility] on 4/9/2021 because of MADRID and weeping from BLE. Endocrine service was consulted for diabetes management.     Subjective  Seen this AM, no acute issue overnight, BG improving     Inpatient diet:   Carb Restricted diet     Point of care glucose monitoring (Independently reviewed)   Recent Labs     04/15/21  1643 04/15/21  2103 04/16/21  0618 04/16/21  1042 04/16/21  1656 04/16/21  2047 04/17/21  0619 04/17/21  1117   GLUMET 218* 183* 201* 156* 122* 157* 149* 323*     Scheduled Meds:   insulin glargine  40 Units Subcutaneous BID    insulin lispro  35 Units Subcutaneous TID WC    bumetanide  2 mg Oral BID    miconazole   Topical BID    ezetimibe  10 mg Oral Nightly    levoFLOXacin  500 mg Oral Daily    doxycycline hyclate  100 mg Oral 2 times per day    insulin lispro  0-18 Units Subcutaneous 4x Daily AC & HS    apixaban  5 mg Oral BID    dilTIAZem  240 mg Oral Daily    escitalopram  20 mg Oral Daily    gabapentin  300 mg Oral TID    pantoprazole  40 mg Oral QAM AC    metoprolol succinate  100 mg Oral Daily    traZODone  50 mg Oral Nightly    montelukast  10 mg Oral Nightly    omega-3 acid ethyl esters  2 g Oral BID    spironolactone  25 mg Oral Daily    vitamin D  50,000 Units Oral Weekly    ferrous sulfate  325 mg Oral Daily with breakfast    ipratropium-albuterol  1 ampule Inhalation Q4H WA    terbinafine  250 mg Oral Daily    sodium chloride flush  5-40 mL Intravenous 2 times per day     PRN Meds:   calcium carbonate, 500 mg, TID PRN  ipratropium-albuterol, 1 vial, Q6H PRN  miconazole, , BID PRN  white petrolatum, 30 mL, TID PRN  glucose, 15 g, PRN  dextrose, 12.5 g, PRN  glucagon (rDNA), 1 mg, PRN  dextrose, 100 mL/hr, PRN  traMADol, 50 mg, Q6H PRN  sodium chloride flush, 5-40 mL, PRN  sodium chloride, 25 mL, PRN  promethazine, 12.5 mg, Q6H PRN    Or  ondansetron, 4 mg, Q6H PRN  polyethylene glycol, 17 g, Daily PRN  acetaminophen, 650 mg, Q6H PRN    Or  acetaminophen, 650 mg, Q6H PRN      Continuous Infusions:   dextrose      sodium chloride         Review of Systems  All systems reviewed. All negative except for symptoms mentioned in HPI     OBJECTIVE    /72   Pulse 65   Temp 97.7 °F (36.5 °C) (Oral)   Resp 14   Ht 5' 3\" (1.6 m)   Wt 272 lb 14 oz (123.8 kg)   LMP  (LMP Unknown)   SpO2 98%   BMI 48.34 kg/m²     Intake/Output Summary (Last 24 hours) at 4/17/2021 1408  Last data filed at 4/17/2021 1111  Gross per 24 hour   Intake 360 ml   Output 5800 ml   Net -5440 ml     Physical examination:  Due to this being a TeleHealth encounter, evaluation of the following organ systems is limited: Vitals/Constitutional/EENT/Resp/CV/GI//MS/Neuro/Skin/Heme-Lymph-Imm. Modified physical exam through Telemedicine camera    General: Communicating well via camera   Neck: no obvious neck mass. No obvious neck deformity     CVS: no distress   Chest: no distress. Chest is moving with respiration    Extremities:  no visible tremor  Skin: + visible skin changes in LE bilateral   Musculoskeletal: no visible deformity  Neuro: Alert and oriented to person, place, and time. Psychiatric: Normal mood and affect.  Behavior is normal    Review of Laboratory Data:  I personally reviewed the following labs:   Recent Labs     04/17/21  1037   WBC 5.1   RBC 4.55   HGB 10.8*   HCT 37.0   MCV 81.3   MCH 23.7*   MCHC 29.2*   RDW 15.3*      MPV 8.8     Recent Labs     04/17/21  1037      K independently     Chris Preston, a 77 y.o.-old female seen today for inpatient diabetes management     Diabetes Mellitus type 2  · Uncontrolled, A1c 15.7%  · BG was high before lunch today but she didn't receive humalog with Bkfst today  · We recommend the following DM regimen   ? lantus 40 units BID   ? Humalog 35 units with meals   ? High dose sliding scale with meals and at nigth   · Continue glucose check with meals and at bedtime   · Will titrate insulin dose based on the blood glucose trend & insulin requirement     Rt adrenal mass  · CT adrenal  5/30/2020 --> high pre-contrast Hounsfield units (34) with late phase washout 49 Hounsfield units makings lesion undetermined. This lesion has increased size when comparison is made with previous study of April 2019. Increased size of the right adrenal gland lesion up to 3.4 x 3 cm, can be of can indicate a suspicious lesion. · Previous endocrine work up 5/2020 showed normal plasma metanephrines but indeterminate 1 mg DST   · Late night saliva cortisol and 24 hrs urine free cortisol and in process   · Pt on Spironolactone and for this reason we can't do Renin/Aptel   · If work up was negative for Cushing's. Pt will need biopsy of this adrenal lesion     Recurrent Cellulitis LE   · Management per ID  · Discussed the importance of controlling DM in management of LE wounds     Afib/acute on chronic CHF  · Cardiology on board    The above issues were reviewed with the patient who understood and agreed with the plan. Thank you for allowing us to participate in the care of this patient. Please do not hesitate to contact us with any additional questions. Keren Terrazas MD  Endocrinologist, [facility] [[email or web address]] Diabetes Care and Endocrinology   [address] [address]   Phone: [phone]  Fax: [phone]  -------------------------  An electronic signature was used to authenticate this note.  Loraine Burgess MD on 4/17/2021 at 2:08 PM

## 2021-04-21 LAB
CORTISOL (UR), FREE: 39 UG/D
CORTISOL URINE, FREE (/G CRT): 23.97 UG/G CRT
CORTISOL,F,UG/L,U: 9.35 UG/L
CREATININE 24 HOUR URINE: 1628 MG/D (ref 500–1400)
CREATININE URINE: 39 MG/DL
HOURS COLLECTED: 24
INTERPRETATION: ABNORMAL
URINE TOTAL VOLUME: 4175

## 2021-04-25 ENCOUNTER — TELEPHONE (OUTPATIENT)
Dept: ENDOCRINOLOGY | Age: 66
End: 2021-04-25

## 2021-04-29 ENCOUNTER — TELEPHONE (OUTPATIENT)
Dept: OTHER | Facility: CLINIC | Age: 66
End: 2021-04-29

## 2021-04-29 ENCOUNTER — APPOINTMENT (OUTPATIENT)
Dept: GENERAL RADIOLOGY | Age: 66
DRG: 720 | End: 2021-04-29
Payer: COMMERCIAL

## 2021-04-29 ENCOUNTER — APPOINTMENT (OUTPATIENT)
Dept: CT IMAGING | Age: 66
DRG: 720 | End: 2021-04-29
Payer: COMMERCIAL

## 2021-04-29 ENCOUNTER — HOSPITAL ENCOUNTER (INPATIENT)
Age: 66
LOS: 7 days | Discharge: ANOTHER HOSPITAL | DRG: 720 | End: 2021-05-06
Attending: EMERGENCY MEDICINE | Admitting: INTERNAL MEDICINE
Payer: COMMERCIAL

## 2021-04-29 ENCOUNTER — APPOINTMENT (OUTPATIENT)
Dept: ULTRASOUND IMAGING | Age: 66
DRG: 720 | End: 2021-04-29
Payer: COMMERCIAL

## 2021-04-29 DIAGNOSIS — N17.9 AKI (ACUTE KIDNEY INJURY) (HCC): ICD-10-CM

## 2021-04-29 DIAGNOSIS — N39.0 URINARY TRACT INFECTION WITHOUT HEMATURIA, SITE UNSPECIFIED: ICD-10-CM

## 2021-04-29 DIAGNOSIS — R41.82 ALTERED MENTAL STATUS, UNSPECIFIED ALTERED MENTAL STATUS TYPE: Primary | ICD-10-CM

## 2021-04-29 DIAGNOSIS — N12 PYELONEPHRITIS: ICD-10-CM

## 2021-04-29 LAB
ALBUMIN SERPL-MCNC: 3.2 G/DL (ref 3.5–5.2)
ALP BLD-CCNC: 76 U/L (ref 35–104)
ALT SERPL-CCNC: 10 U/L (ref 0–32)
ANION GAP SERPL CALCULATED.3IONS-SCNC: 15 MMOL/L (ref 7–16)
APTT: 36.3 SEC (ref 24.5–35.1)
AST SERPL-CCNC: 19 U/L (ref 0–31)
BACTERIA: ABNORMAL /HPF
BASOPHILS ABSOLUTE: 0.02 E9/L (ref 0–0.2)
BASOPHILS RELATIVE PERCENT: 0.2 % (ref 0–2)
BILIRUB SERPL-MCNC: 0.4 MG/DL (ref 0–1.2)
BILIRUBIN URINE: ABNORMAL
BLOOD, URINE: ABNORMAL
BUN BLDV-MCNC: 47 MG/DL (ref 6–23)
CALCIUM SERPL-MCNC: 8.7 MG/DL (ref 8.6–10.2)
CHLORIDE BLD-SCNC: 87 MMOL/L (ref 98–107)
CLARITY: ABNORMAL
CO2: 30 MMOL/L (ref 22–29)
COLOR: YELLOW
CREAT SERPL-MCNC: 1.8 MG/DL (ref 0.5–1)
CREATININE URINE: 261 MG/DL (ref 29–226)
EOSINOPHILS ABSOLUTE: 0.02 E9/L (ref 0.05–0.5)
EOSINOPHILS RELATIVE PERCENT: 0.2 % (ref 0–6)
EPITHELIAL CELLS, UA: ABNORMAL /HPF
GFR AFRICAN AMERICAN: 34
GFR NON-AFRICAN AMERICAN: 28 ML/MIN/1.73
GLUCOSE BLD-MCNC: 298 MG/DL (ref 74–99)
GLUCOSE URINE: NEGATIVE MG/DL
HCT VFR BLD CALC: 34.1 % (ref 34–48)
HEMOGLOBIN: 10.2 G/DL (ref 11.5–15.5)
IMMATURE GRANULOCYTES #: 0.1 E9/L
IMMATURE GRANULOCYTES %: 1.1 % (ref 0–5)
INR BLD: 1.8
KETONES, URINE: ABNORMAL MG/DL
LACTIC ACID: 2.8 MMOL/L (ref 0.5–2.2)
LEUKOCYTE ESTERASE, URINE: ABNORMAL
LYMPHOCYTES ABSOLUTE: 0.69 E9/L (ref 1.5–4)
LYMPHOCYTES RELATIVE PERCENT: 7.4 % (ref 20–42)
MCH RBC QN AUTO: 23.7 PG (ref 26–35)
MCHC RBC AUTO-ENTMCNC: 29.9 % (ref 32–34.5)
MCV RBC AUTO: 79.1 FL (ref 80–99.9)
MONOCYTES ABSOLUTE: 0.51 E9/L (ref 0.1–0.95)
MONOCYTES RELATIVE PERCENT: 5.5 % (ref 2–12)
NEUTROPHILS ABSOLUTE: 7.93 E9/L (ref 1.8–7.3)
NEUTROPHILS RELATIVE PERCENT: 85.6 % (ref 43–80)
NITRITE, URINE: NEGATIVE
PDW BLD-RTO: 15.7 FL (ref 11.5–15)
PH UA: 5 (ref 5–9)
PLATELET # BLD: 109 E9/L (ref 130–450)
PMV BLD AUTO: 11.5 FL (ref 7–12)
POTASSIUM SERPL-SCNC: 4.2 MMOL/L (ref 3.5–5)
PROTEIN UA: 100 MG/DL
PROTHROMBIN TIME: 19.5 SEC (ref 9.3–12.4)
RBC # BLD: 4.31 E12/L (ref 3.5–5.5)
RBC UA: ABNORMAL /HPF (ref 0–2)
SARS-COV-2, NAAT: NOT DETECTED
SODIUM BLD-SCNC: 132 MMOL/L (ref 132–146)
SODIUM URINE: 37 MMOL/L
SPECIFIC GRAVITY UA: >=1.03 (ref 1–1.03)
TOTAL PROTEIN: 6.5 G/DL (ref 6.4–8.3)
TROPONIN: 0.02 NG/ML (ref 0–0.03)
UREA NITROGEN, UR: 192 MG/DL (ref 800–1666)
UROBILINOGEN, URINE: 0.2 E.U./DL
WBC # BLD: 9.3 E9/L (ref 4.5–11.5)
WBC UA: ABNORMAL /HPF (ref 0–5)

## 2021-04-29 PROCEDURE — 6370000000 HC RX 637 (ALT 250 FOR IP): Performed by: INTERNAL MEDICINE

## 2021-04-29 PROCEDURE — 87186 SC STD MICRODIL/AGAR DIL: CPT

## 2021-04-29 PROCEDURE — 83605 ASSAY OF LACTIC ACID: CPT

## 2021-04-29 PROCEDURE — 71045 X-RAY EXAM CHEST 1 VIEW: CPT

## 2021-04-29 PROCEDURE — 85025 COMPLETE CBC W/AUTO DIFF WBC: CPT

## 2021-04-29 PROCEDURE — 93005 ELECTROCARDIOGRAM TRACING: CPT | Performed by: EMERGENCY MEDICINE

## 2021-04-29 PROCEDURE — P9612 CATHETERIZE FOR URINE SPEC: HCPCS

## 2021-04-29 PROCEDURE — 96374 THER/PROPH/DIAG INJ IV PUSH: CPT

## 2021-04-29 PROCEDURE — 76770 US EXAM ABDO BACK WALL COMP: CPT

## 2021-04-29 PROCEDURE — 6360000002 HC RX W HCPCS: Performed by: INTERNAL MEDICINE

## 2021-04-29 PROCEDURE — 85730 THROMBOPLASTIN TIME PARTIAL: CPT

## 2021-04-29 PROCEDURE — 2700000000 HC OXYGEN THERAPY PER DAY

## 2021-04-29 PROCEDURE — 84484 ASSAY OF TROPONIN QUANT: CPT

## 2021-04-29 PROCEDURE — 87077 CULTURE AEROBIC IDENTIFY: CPT

## 2021-04-29 PROCEDURE — 85610 PROTHROMBIN TIME: CPT

## 2021-04-29 PROCEDURE — 84540 ASSAY OF URINE/UREA-N: CPT

## 2021-04-29 PROCEDURE — 82570 ASSAY OF URINE CREATININE: CPT

## 2021-04-29 PROCEDURE — 87040 BLOOD CULTURE FOR BACTERIA: CPT

## 2021-04-29 PROCEDURE — 87150 DNA/RNA AMPLIFIED PROBE: CPT

## 2021-04-29 PROCEDURE — 2580000003 HC RX 258: Performed by: EMERGENCY MEDICINE

## 2021-04-29 PROCEDURE — 80053 COMPREHEN METABOLIC PANEL: CPT

## 2021-04-29 PROCEDURE — 2060000000 HC ICU INTERMEDIATE R&B

## 2021-04-29 PROCEDURE — 2580000003 HC RX 258: Performed by: INTERNAL MEDICINE

## 2021-04-29 PROCEDURE — 99284 EMERGENCY DEPT VISIT MOD MDM: CPT

## 2021-04-29 PROCEDURE — 74176 CT ABD & PELVIS W/O CONTRAST: CPT

## 2021-04-29 PROCEDURE — 96361 HYDRATE IV INFUSION ADD-ON: CPT

## 2021-04-29 PROCEDURE — 6370000000 HC RX 637 (ALT 250 FOR IP): Performed by: EMERGENCY MEDICINE

## 2021-04-29 PROCEDURE — 87635 SARS-COV-2 COVID-19 AMP PRB: CPT

## 2021-04-29 PROCEDURE — 6360000002 HC RX W HCPCS: Performed by: EMERGENCY MEDICINE

## 2021-04-29 PROCEDURE — 81001 URINALYSIS AUTO W/SCOPE: CPT

## 2021-04-29 PROCEDURE — 70450 CT HEAD/BRAIN W/O DYE: CPT

## 2021-04-29 PROCEDURE — 84300 ASSAY OF URINE SODIUM: CPT

## 2021-04-29 RX ORDER — DILTIAZEM HYDROCHLORIDE 240 MG/1
240 CAPSULE, COATED, EXTENDED RELEASE ORAL 2 TIMES DAILY
Status: DISCONTINUED | OUTPATIENT
Start: 2021-04-29 | End: 2021-05-06 | Stop reason: HOSPADM

## 2021-04-29 RX ORDER — METOPROLOL SUCCINATE 25 MG/1
100 TABLET, EXTENDED RELEASE ORAL DAILY
Status: DISCONTINUED | OUTPATIENT
Start: 2021-04-30 | End: 2021-04-30

## 2021-04-29 RX ORDER — ONDANSETRON 2 MG/ML
4 INJECTION INTRAMUSCULAR; INTRAVENOUS EVERY 6 HOURS PRN
Status: DISCONTINUED | OUTPATIENT
Start: 2021-04-29 | End: 2021-04-29 | Stop reason: SDUPTHER

## 2021-04-29 RX ORDER — KETOROLAC TROMETHAMINE 30 MG/ML
15 INJECTION, SOLUTION INTRAMUSCULAR; INTRAVENOUS ONCE
Status: COMPLETED | OUTPATIENT
Start: 2021-04-29 | End: 2021-04-29

## 2021-04-29 RX ORDER — ONDANSETRON 2 MG/ML
4 INJECTION INTRAMUSCULAR; INTRAVENOUS EVERY 6 HOURS PRN
Status: DISCONTINUED | OUTPATIENT
Start: 2021-04-29 | End: 2021-05-06 | Stop reason: HOSPADM

## 2021-04-29 RX ORDER — TRAZODONE HYDROCHLORIDE 50 MG/1
50 TABLET ORAL NIGHTLY
Status: DISCONTINUED | OUTPATIENT
Start: 2021-04-29 | End: 2021-05-06 | Stop reason: HOSPADM

## 2021-04-29 RX ORDER — ACETAMINOPHEN 650 MG/1
650 SUPPOSITORY RECTAL EVERY 6 HOURS PRN
Status: DISCONTINUED | OUTPATIENT
Start: 2021-04-29 | End: 2021-05-06 | Stop reason: HOSPADM

## 2021-04-29 RX ORDER — DEXTROSE MONOHYDRATE 25 G/50ML
12.5 INJECTION, SOLUTION INTRAVENOUS PRN
Status: DISCONTINUED | OUTPATIENT
Start: 2021-04-29 | End: 2021-05-06 | Stop reason: HOSPADM

## 2021-04-29 RX ORDER — ESCITALOPRAM OXALATE 10 MG/1
20 TABLET ORAL DAILY
Status: DISCONTINUED | OUTPATIENT
Start: 2021-04-30 | End: 2021-05-06 | Stop reason: HOSPADM

## 2021-04-29 RX ORDER — SODIUM CHLORIDE 9 MG/ML
INJECTION, SOLUTION INTRAVENOUS CONTINUOUS
Status: DISCONTINUED | OUTPATIENT
Start: 2021-04-29 | End: 2021-04-29 | Stop reason: SDUPTHER

## 2021-04-29 RX ORDER — SODIUM CHLORIDE 9 MG/ML
INJECTION, SOLUTION INTRAVENOUS CONTINUOUS
Status: DISCONTINUED | OUTPATIENT
Start: 2021-04-29 | End: 2021-05-03

## 2021-04-29 RX ORDER — SODIUM CHLORIDE 9 MG/ML
25 INJECTION, SOLUTION INTRAVENOUS PRN
Status: DISCONTINUED | OUTPATIENT
Start: 2021-04-29 | End: 2021-05-03 | Stop reason: SDUPTHER

## 2021-04-29 RX ORDER — SODIUM CHLORIDE 0.9 % (FLUSH) 0.9 %
5-40 SYRINGE (ML) INJECTION EVERY 12 HOURS SCHEDULED
Status: DISCONTINUED | OUTPATIENT
Start: 2021-04-29 | End: 2021-05-03 | Stop reason: SDUPTHER

## 2021-04-29 RX ORDER — MONTELUKAST SODIUM 10 MG/1
10 TABLET ORAL NIGHTLY
Status: DISCONTINUED | OUTPATIENT
Start: 2021-04-29 | End: 2021-05-06 | Stop reason: HOSPADM

## 2021-04-29 RX ORDER — PROMETHAZINE HYDROCHLORIDE 25 MG/1
12.5 TABLET ORAL EVERY 6 HOURS PRN
Status: DISCONTINUED | OUTPATIENT
Start: 2021-04-29 | End: 2021-05-06 | Stop reason: HOSPADM

## 2021-04-29 RX ORDER — ALBUTEROL SULFATE 2.5 MG/3ML
2.5 SOLUTION RESPIRATORY (INHALATION) EVERY 4 HOURS PRN
Status: DISCONTINUED | OUTPATIENT
Start: 2021-04-29 | End: 2021-05-06 | Stop reason: HOSPADM

## 2021-04-29 RX ORDER — SODIUM CHLORIDE 0.9 % (FLUSH) 0.9 %
5-40 SYRINGE (ML) INJECTION PRN
Status: DISCONTINUED | OUTPATIENT
Start: 2021-04-29 | End: 2021-05-03 | Stop reason: SDUPTHER

## 2021-04-29 RX ORDER — GABAPENTIN 300 MG/1
300 CAPSULE ORAL 3 TIMES DAILY
Status: DISCONTINUED | OUTPATIENT
Start: 2021-04-29 | End: 2021-05-06 | Stop reason: HOSPADM

## 2021-04-29 RX ORDER — PROMETHAZINE HYDROCHLORIDE 25 MG/1
12.5 TABLET ORAL EVERY 6 HOURS PRN
Status: DISCONTINUED | OUTPATIENT
Start: 2021-04-29 | End: 2021-04-29 | Stop reason: SDUPTHER

## 2021-04-29 RX ORDER — CALCIUM CARBONATE 200(500)MG
500 TABLET,CHEWABLE ORAL 3 TIMES DAILY PRN
Status: DISCONTINUED | OUTPATIENT
Start: 2021-04-29 | End: 2021-05-06 | Stop reason: HOSPADM

## 2021-04-29 RX ORDER — 0.9 % SODIUM CHLORIDE 0.9 %
500 INTRAVENOUS SOLUTION INTRAVENOUS ONCE
Status: COMPLETED | OUTPATIENT
Start: 2021-04-29 | End: 2021-04-29

## 2021-04-29 RX ORDER — ACETAMINOPHEN 500 MG
1000 TABLET ORAL ONCE
Status: COMPLETED | OUTPATIENT
Start: 2021-04-29 | End: 2021-04-29

## 2021-04-29 RX ORDER — ACETAMINOPHEN 325 MG/1
650 TABLET ORAL EVERY 6 HOURS PRN
Status: DISCONTINUED | OUTPATIENT
Start: 2021-04-29 | End: 2021-05-06 | Stop reason: HOSPADM

## 2021-04-29 RX ORDER — NICOTINE POLACRILEX 4 MG
15 LOZENGE BUCCAL PRN
Status: DISCONTINUED | OUTPATIENT
Start: 2021-04-29 | End: 2021-05-06 | Stop reason: HOSPADM

## 2021-04-29 RX ORDER — DEXTROSE MONOHYDRATE 50 MG/ML
100 INJECTION, SOLUTION INTRAVENOUS PRN
Status: DISCONTINUED | OUTPATIENT
Start: 2021-04-29 | End: 2021-05-06 | Stop reason: HOSPADM

## 2021-04-29 RX ORDER — INSULIN GLARGINE 100 [IU]/ML
40 INJECTION, SOLUTION SUBCUTANEOUS 2 TIMES DAILY
Status: DISCONTINUED | OUTPATIENT
Start: 2021-04-29 | End: 2021-05-06 | Stop reason: HOSPADM

## 2021-04-29 RX ORDER — PANTOPRAZOLE SODIUM 40 MG/1
40 TABLET, DELAYED RELEASE ORAL
Status: DISCONTINUED | OUTPATIENT
Start: 2021-04-30 | End: 2021-05-06 | Stop reason: HOSPADM

## 2021-04-29 RX ADMIN — CEFEPIME HYDROCHLORIDE 1000 MG: 1 INJECTION, POWDER, FOR SOLUTION INTRAMUSCULAR; INTRAVENOUS at 23:51

## 2021-04-29 RX ADMIN — APIXABAN 5 MG: 5 TABLET, FILM COATED ORAL at 23:52

## 2021-04-29 RX ADMIN — TRAZODONE HYDROCHLORIDE 50 MG: 50 TABLET ORAL at 23:52

## 2021-04-29 RX ADMIN — SODIUM CHLORIDE: 9 INJECTION, SOLUTION INTRAVENOUS at 23:52

## 2021-04-29 RX ADMIN — SODIUM CHLORIDE 500 ML: 9 INJECTION, SOLUTION INTRAVENOUS at 16:40

## 2021-04-29 RX ADMIN — SODIUM CHLORIDE: 9 INJECTION, SOLUTION INTRAVENOUS at 16:40

## 2021-04-29 RX ADMIN — ACETAMINOPHEN 1000 MG: 500 TABLET ORAL at 17:11

## 2021-04-29 RX ADMIN — CEFTRIAXONE 1000 MG: 1 INJECTION, POWDER, FOR SOLUTION INTRAMUSCULAR; INTRAVENOUS at 20:53

## 2021-04-29 RX ADMIN — KETOROLAC TROMETHAMINE 15 MG: 30 INJECTION, SOLUTION INTRAMUSCULAR at 17:11

## 2021-04-29 RX ADMIN — MONTELUKAST SODIUM 10 MG: 10 TABLET, FILM COATED ORAL at 23:52

## 2021-04-29 NOTE — TELEPHONE ENCOUNTER
Writer contacted Dr. Maximus Gastelum to inform of 30 day readmission risk. Writer's attempt to contact Dr. Maximus Gastelum was unsuccessful.

## 2021-04-29 NOTE — ED NOTES
Bed: 34  Expected date: 4/29/21  Expected time:   Means of arrival: [facility]  Comments:  Rosaura Hodgkins, RN  04/29/21 7332

## 2021-04-30 LAB
ACINETOBACTER BAUMANNII BY PCR: NOT DETECTED
BOTTLE TYPE: ABNORMAL
CANDIDA ALBICANS BY PCR: NOT DETECTED
CANDIDA GLABRATA BY PCR: NOT DETECTED
CANDIDA KRUSEI BY PCR: NOT DETECTED
CANDIDA PARAPSILOSIS BY PCR: NOT DETECTED
CANDIDA TROPICALIS BY PCR: NOT DETECTED
CARBAPENEM RESISTANCE KPC BY PCR: NOT DETECTED
CHP ED QC CHECK: NORMAL
EKG ATRIAL RATE: 144 BPM
EKG ATRIAL RATE: 159 BPM
EKG P AXIS: 18 DEGREES
EKG P-R INTERVAL: 162 MS
EKG Q-T INTERVAL: 338 MS
EKG Q-T INTERVAL: 340 MS
EKG QRS DURATION: 100 MS
EKG QRS DURATION: 106 MS
EKG QTC CALCULATION (BAZETT): 523 MS
EKG QTC CALCULATION (BAZETT): 535 MS
EKG R AXIS: 158 DEGREES
EKG R AXIS: 168 DEGREES
EKG T AXIS: 107 DEGREES
EKG T AXIS: 63 DEGREES
EKG VENTRICULAR RATE: 144 BPM
EKG VENTRICULAR RATE: 149 BPM
ENTEROBACTER CLOACAE COMPLEX BY PCR: NOT DETECTED
ENTEROBACTERALES BY PCR: DETECTED
ENTEROCOCCUS BY PCR: NOT DETECTED
ESCHERICHIA COLI BY PCR: DETECTED
GLUCOSE BLD-MCNC: 367 MG/DL
HAEMOPHILUS INFLUENZAE BY PCR: NOT DETECTED
HBA1C MFR BLD: 10.9 % (ref 4–5.6)
HCT VFR BLD CALC: 29.9 % (ref 34–48)
HEMOGLOBIN: 9 G/DL (ref 11.5–15.5)
KLEBSIELLA OXYTOCA BY PCR: NOT DETECTED
KLEBSIELLA PNEUMONIAE GROUP BY PCR: NOT DETECTED
LISTERIA MONOCYTOGENES BY PCR: NOT DETECTED
MCH RBC QN AUTO: 23.7 PG (ref 26–35)
MCHC RBC AUTO-ENTMCNC: 30.1 % (ref 32–34.5)
MCV RBC AUTO: 78.9 FL (ref 80–99.9)
METER GLUCOSE: 355 MG/DL (ref 74–99)
METER GLUCOSE: 367 MG/DL (ref 74–99)
METER GLUCOSE: 493 MG/DL (ref 74–99)
NEISSERIA MENINGITIDIS BY PCR: NOT DETECTED
ORDER NUMBER: ABNORMAL
PDW BLD-RTO: 15.6 FL (ref 11.5–15)
PLATELET # BLD: 91 E9/L (ref 130–450)
PLATELET CONFIRMATION: NORMAL
PMV BLD AUTO: 11.1 FL (ref 7–12)
PROTEUS SPECIES BY PCR: NOT DETECTED
PSEUDOMONAS AERUGINOSA BY PCR: NOT DETECTED
RBC # BLD: 3.79 E12/L (ref 3.5–5.5)
SERRATIA MARCESCENS BY PCR: NOT DETECTED
SOURCE OF BLOOD CULTURE: ABNORMAL
STAPHYLOCOCCUS AUREUS BY PCR: NOT DETECTED
STAPHYLOCOCCUS SPECIES BY PCR: NOT DETECTED
STREPTOCOCCUS AGALACTIAE BY PCR: NOT DETECTED
STREPTOCOCCUS PNEUMONIAE BY PCR: NOT DETECTED
STREPTOCOCCUS PYOGENES  BY PCR: NOT DETECTED
STREPTOCOCCUS SPECIES BY PCR: NOT DETECTED
TROPONIN: 0.02 NG/ML (ref 0–0.03)
WBC # BLD: 8.4 E9/L (ref 4.5–11.5)

## 2021-04-30 PROCEDURE — 2580000003 HC RX 258: Performed by: INTERNAL MEDICINE

## 2021-04-30 PROCEDURE — 6370000000 HC RX 637 (ALT 250 FOR IP): Performed by: INTERNAL MEDICINE

## 2021-04-30 PROCEDURE — 2700000000 HC OXYGEN THERAPY PER DAY

## 2021-04-30 PROCEDURE — 2060000000 HC ICU INTERMEDIATE R&B

## 2021-04-30 PROCEDURE — 2500000003 HC RX 250 WO HCPCS: Performed by: INTERNAL MEDICINE

## 2021-04-30 PROCEDURE — 93010 ELECTROCARDIOGRAM REPORT: CPT | Performed by: INTERNAL MEDICINE

## 2021-04-30 PROCEDURE — 6370000000 HC RX 637 (ALT 250 FOR IP): Performed by: PHYSICIAN ASSISTANT

## 2021-04-30 PROCEDURE — 93005 ELECTROCARDIOGRAM TRACING: CPT | Performed by: PHYSICIAN ASSISTANT

## 2021-04-30 PROCEDURE — 6360000002 HC RX W HCPCS: Performed by: INTERNAL MEDICINE

## 2021-04-30 PROCEDURE — 82962 GLUCOSE BLOOD TEST: CPT

## 2021-04-30 PROCEDURE — 85027 COMPLETE CBC AUTOMATED: CPT

## 2021-04-30 PROCEDURE — 97166 OT EVAL MOD COMPLEX 45 MIN: CPT

## 2021-04-30 PROCEDURE — 84484 ASSAY OF TROPONIN QUANT: CPT

## 2021-04-30 PROCEDURE — 97535 SELF CARE MNGMENT TRAINING: CPT

## 2021-04-30 PROCEDURE — 94640 AIRWAY INHALATION TREATMENT: CPT

## 2021-04-30 PROCEDURE — 83036 HEMOGLOBIN GLYCOSYLATED A1C: CPT

## 2021-04-30 PROCEDURE — 94664 DEMO&/EVAL PT USE INHALER: CPT

## 2021-04-30 RX ORDER — INSULIN LISPRO 100 [IU]/ML
35 INJECTION, SOLUTION INTRAVENOUS; SUBCUTANEOUS 3 TIMES DAILY
COMMUNITY
End: 2021-06-30

## 2021-04-30 RX ORDER — METOPROLOL SUCCINATE 100 MG/1
100 TABLET, EXTENDED RELEASE ORAL DAILY
Status: DISCONTINUED | OUTPATIENT
Start: 2021-04-30 | End: 2021-05-02

## 2021-04-30 RX ORDER — PROMETHAZINE HYDROCHLORIDE 25 MG/1
25 TABLET ORAL EVERY 4 HOURS PRN
COMMUNITY
End: 2021-06-14

## 2021-04-30 RX ORDER — LANSOPRAZOLE 30 MG/1
CAPSULE, DELAYED RELEASE ORAL
Qty: 90 CAPSULE | Refills: 1 | OUTPATIENT
Start: 2021-04-30

## 2021-04-30 RX ORDER — ALBUTEROL SULFATE 90 UG/1
2 AEROSOL, METERED RESPIRATORY (INHALATION) EVERY 6 HOURS PRN
Status: ON HOLD | COMMUNITY
End: 2021-05-05 | Stop reason: HOSPADM

## 2021-04-30 RX ORDER — INSULIN GLARGINE 100 [IU]/ML
52 INJECTION, SOLUTION SUBCUTANEOUS NIGHTLY
Status: ON HOLD | COMMUNITY
End: 2022-03-22 | Stop reason: HOSPADM

## 2021-04-30 RX ORDER — DILTIAZEM HYDROCHLORIDE 240 MG/1
240 CAPSULE, COATED, EXTENDED RELEASE ORAL DAILY
Status: ON HOLD | COMMUNITY
End: 2021-05-05 | Stop reason: HOSPADM

## 2021-04-30 RX ORDER — DILTIAZEM HYDROCHLORIDE 5 MG/ML
10 INJECTION INTRAVENOUS ONCE
Status: COMPLETED | OUTPATIENT
Start: 2021-04-30 | End: 2021-04-30

## 2021-04-30 RX ORDER — ACETAMINOPHEN 500 MG
1000 TABLET ORAL EVERY 6 HOURS PRN
COMMUNITY

## 2021-04-30 RX ADMIN — TRAZODONE HYDROCHLORIDE 50 MG: 50 TABLET ORAL at 21:34

## 2021-04-30 RX ADMIN — ALBUTEROL SULFATE 2.5 MG: 2.5 SOLUTION RESPIRATORY (INHALATION) at 17:49

## 2021-04-30 RX ADMIN — SODIUM CHLORIDE, PRESERVATIVE FREE 10 ML: 5 INJECTION INTRAVENOUS at 11:55

## 2021-04-30 RX ADMIN — CEFEPIME HYDROCHLORIDE 1000 MG: 1 INJECTION, POWDER, FOR SOLUTION INTRAMUSCULAR; INTRAVENOUS at 11:54

## 2021-04-30 RX ADMIN — GABAPENTIN 300 MG: 300 CAPSULE ORAL at 14:51

## 2021-04-30 RX ADMIN — INSULIN GLARGINE 40 UNITS: 100 INJECTION, SOLUTION SUBCUTANEOUS at 21:42

## 2021-04-30 RX ADMIN — APIXABAN 5 MG: 5 TABLET, FILM COATED ORAL at 11:54

## 2021-04-30 RX ADMIN — ALBUTEROL SULFATE 2.5 MG: 2.5 SOLUTION RESPIRATORY (INHALATION) at 12:50

## 2021-04-30 RX ADMIN — INSULIN GLARGINE 40 UNITS: 100 INJECTION, SOLUTION SUBCUTANEOUS at 11:54

## 2021-04-30 RX ADMIN — SODIUM CHLORIDE, PRESERVATIVE FREE 10 ML: 5 INJECTION INTRAVENOUS at 21:34

## 2021-04-30 RX ADMIN — APIXABAN 5 MG: 5 TABLET, FILM COATED ORAL at 21:34

## 2021-04-30 RX ADMIN — MICONAZOLE NITRATE: 20.6 POWDER TOPICAL at 11:55

## 2021-04-30 RX ADMIN — ACETAMINOPHEN 650 MG: 325 TABLET ORAL at 21:33

## 2021-04-30 RX ADMIN — MONTELUKAST SODIUM 10 MG: 10 TABLET, FILM COATED ORAL at 21:34

## 2021-04-30 RX ADMIN — DILTIAZEM HYDROCHLORIDE 240 MG: 240 CAPSULE, EXTENDED RELEASE ORAL at 01:34

## 2021-04-30 RX ADMIN — INSULIN LISPRO 9 UNITS: 100 INJECTION, SOLUTION INTRAVENOUS; SUBCUTANEOUS at 21:44

## 2021-04-30 RX ADMIN — PANTOPRAZOLE SODIUM 40 MG: 40 TABLET, DELAYED RELEASE ORAL at 11:54

## 2021-04-30 RX ADMIN — DILTIAZEM HYDROCHLORIDE 10 MG: 5 INJECTION INTRAVENOUS at 17:19

## 2021-04-30 RX ADMIN — INSULIN LISPRO 5 UNITS: 100 INJECTION, SOLUTION INTRAVENOUS; SUBCUTANEOUS at 14:52

## 2021-04-30 RX ADMIN — GABAPENTIN 300 MG: 300 CAPSULE ORAL at 11:54

## 2021-04-30 RX ADMIN — GABAPENTIN 300 MG: 300 CAPSULE ORAL at 21:34

## 2021-04-30 RX ADMIN — DEXTROSE MONOHYDRATE 5 MG/HR: 50 INJECTION, SOLUTION INTRAVENOUS at 18:23

## 2021-04-30 RX ADMIN — METOPROLOL SUCCINATE 100 MG: 100 TABLET, EXTENDED RELEASE ORAL at 04:00

## 2021-04-30 RX ADMIN — MICONAZOLE NITRATE: 20.6 POWDER TOPICAL at 21:58

## 2021-04-30 RX ADMIN — DILTIAZEM HYDROCHLORIDE 240 MG: 240 CAPSULE, EXTENDED RELEASE ORAL at 16:06

## 2021-04-30 RX ADMIN — CEFEPIME HYDROCHLORIDE 1000 MG: 1 INJECTION, POWDER, FOR SOLUTION INTRAMUSCULAR; INTRAVENOUS at 21:53

## 2021-04-30 RX ADMIN — SODIUM CHLORIDE: 9 INJECTION, SOLUTION INTRAVENOUS at 14:51

## 2021-04-30 RX ADMIN — ALBUTEROL SULFATE 2.5 MG: 2.5 SOLUTION RESPIRATORY (INHALATION) at 00:38

## 2021-04-30 ASSESSMENT — PAIN DESCRIPTION - LOCATION
LOCATION: LEG
LOCATION: ABDOMEN

## 2021-04-30 ASSESSMENT — PAIN SCALES - GENERAL: PAINLEVEL_OUTOF10: 8

## 2021-04-30 ASSESSMENT — PAIN DESCRIPTION - PAIN TYPE: TYPE: CHRONIC PAIN

## 2021-04-30 ASSESSMENT — PAIN DESCRIPTION - ONSET: ONSET: ON-GOING

## 2021-04-30 ASSESSMENT — PAIN DESCRIPTION - FREQUENCY: FREQUENCY: CONTINUOUS

## 2021-04-30 ASSESSMENT — PAIN - FUNCTIONAL ASSESSMENT: PAIN_FUNCTIONAL_ASSESSMENT: PREVENTS OR INTERFERES SOME ACTIVE ACTIVITIES AND ADLS

## 2021-04-30 ASSESSMENT — PAIN DESCRIPTION - DESCRIPTORS: DESCRIPTORS: ACHING;CONSTANT;DISCOMFORT

## 2021-04-30 ASSESSMENT — PAIN DESCRIPTION - ORIENTATION: ORIENTATION: RIGHT;LEFT

## 2021-04-30 NOTE — ED PROVIDER NOTES
();  section (); Anus surgery (2006); Upper gastrointestinal endoscopy (2004); Colonoscopy (2004); Colonoscopy (2006); anoscopy (10/25/2006); anoscopy (2007); anoscopy (2007); Colonoscopy (3/23/2012); Anus surgery (2012); Upper gastrointestinal endoscopy (N/A, 2018); Upper gastrointestinal endoscopy (N/A, 2018); pr debridement, skin, sub-q tissue,muscle,=<20 sq cm (Left, 2018); Endoscopy, colon, diagnostic; fracture surgery; and joint replacement (). Social History:  reports that she quit smoking about 16 years ago. Her smoking use included cigarettes. She started smoking about 41 years ago. She has a 37.50 pack-year smoking history. She has never used smokeless tobacco. She reports previous alcohol use. She reports previous drug use. Family History: family history includes Colon Cancer in her father and sister; Diabetes in her father, paternal aunt, paternal aunt, paternal uncle, paternal uncle, and sister; Heart Disease in her mother; Other in her father and sister. . Unless otherwise noted, family history is non contributory    The patients home medications have been reviewed. Allergies: Statins        ---------------------------------------------------PHYSICAL EXAM--------------------------------------    Constitutional/General: Alert and oriented x3, obese  Head: Normocephalic and atraumatic  Eyes: PERRL, EOMI, sclera non icteric  Mouth: Oropharynx clear, handling secretions, no trismus, no asymmetry of the posterior oropharynx or uvular edema  Neck: Supple, full ROM, no stridor, no meningeal signs  Respiratory: Lungs clear to auscultation bilaterally, no wheezes, rales, or rhonchi. Not in respiratory distress  Cardiovascular:  Regular tachycardia. Regular rhythm. 2+ distal pulses. Equal extremity pulses.    Chest: No chest wall tenderness  GI:  Abdomen Soft, with mild diffuse tenderness, no guarding or rebound, bowel sounds normal  Musculoskeletal: Moves all extremities x 4. Warm and well perfused, no clubbing, cyanosis, or edema. Capillary refill <3 seconds  Integument: skin warm and dry. No rashes. Neurologic: GCS 15, no focal deficits, symmetric strength 5/5 in the upper and lower extremities bilaterally  Psychiatric: Normal Affect          -------------------------------------------------- RESULTS -------------------------------------------------  I have personally reviewed all laboratory and imaging results for this patient. Results are listed below.      LABS: (Lab results interpreted by me)  Results for orders placed or performed during the hospital encounter of 04/29/21   COVID-19, Rapid    Specimen: Nasopharyngeal Swab   Result Value Ref Range    SARS-CoV-2, NAAT Not Detected Not Detected   CBC Auto Differential   Result Value Ref Range    WBC 9.3 4.5 - 11.5 E9/L    RBC 4.31 3.50 - 5.50 E12/L    Hemoglobin 10.2 (L) 11.5 - 15.5 g/dL    Hematocrit 34.1 34.0 - 48.0 %    MCV 79.1 (L) 80.0 - 99.9 fL    MCH 23.7 (L) 26.0 - 35.0 pg    MCHC 29.9 (L) 32.0 - 34.5 %    RDW 15.7 (H) 11.5 - 15.0 fL    Platelets 336 (L) 663 - 450 E9/L    MPV 11.5 7.0 - 12.0 fL    Neutrophils % 85.6 (H) 43.0 - 80.0 %    Immature Granulocytes % 1.1 0.0 - 5.0 %    Lymphocytes % 7.4 (L) 20.0 - 42.0 %    Monocytes % 5.5 2.0 - 12.0 %    Eosinophils % 0.2 0.0 - 6.0 %    Basophils % 0.2 0.0 - 2.0 %    Neutrophils Absolute 7.93 (H) 1.80 - 7.30 E9/L    Immature Granulocytes # 0.10 E9/L    Lymphocytes Absolute 0.69 (L) 1.50 - 4.00 E9/L    Monocytes Absolute 0.51 0.10 - 0.95 E9/L    Eosinophils Absolute 0.02 (L) 0.05 - 0.50 E9/L    Basophils Absolute 0.02 0.00 - 0.20 E9/L   Comprehensive Metabolic Panel   Result Value Ref Range    Sodium 132 132 - 146 mmol/L    Potassium 4.2 3.5 - 5.0 mmol/L    Chloride 87 (L) 98 - 107 mmol/L    CO2 30 (H) 22 - 29 mmol/L    Anion Gap 15 7 - 16 mmol/L    Glucose 298 (H) 74 - 99 mg/dL    BUN 47 (H) 6 - 23 mg/dL    CREATININE 1.8 (H) 0.5 - 1.0 mg/dL    GFR Non-African American 28 >=60 mL/min/1.73    GFR African American 34     Calcium 8.7 8.6 - 10.2 mg/dL    Total Protein 6.5 6.4 - 8.3 g/dL    Albumin 3.2 (L) 3.5 - 5.2 g/dL    Total Bilirubin 0.4 0.0 - 1.2 mg/dL    Alkaline Phosphatase 76 35 - 104 U/L    ALT 10 0 - 32 U/L    AST 19 0 - 31 U/L   Protime-INR   Result Value Ref Range    Protime 19.5 (H) 9.3 - 12.4 sec    INR 1.8    APTT   Result Value Ref Range    aPTT 36.3 (H) 24.5 - 35.1 sec   Troponin   Result Value Ref Range    Troponin 0.02 0.00 - 0.03 ng/mL   Urinalysis   Result Value Ref Range    Color, UA Yellow Straw/Yellow    Clarity, UA CLOUDY (A) Clear    Glucose, Ur Negative Negative mg/dL    Bilirubin Urine SMALL (A) Negative    Ketones, Urine TRACE (A) Negative mg/dL    Specific Gravity, UA >=1.030 1.005 - 1.030    Blood, Urine LARGE (A) Negative    pH, UA 5.0 5.0 - 9.0    Protein,  (A) Negative mg/dL    Urobilinogen, Urine 0.2 <2.0 E.U./dL    Nitrite, Urine Negative Negative    Leukocyte Esterase, Urine MODERATE (A) Negative   Lactic Acid, Plasma   Result Value Ref Range    Lactic Acid 2.8 (H) 0.5 - 2.2 mmol/L   Microscopic Urinalysis   Result Value Ref Range    WBC, UA 5-10 (A) 0 - 5 /HPF    RBC, UA 10-20 (A) 0 - 2 /HPF    Epithelial Cells, UA MODERATE /HPF    Bacteria, UA MANY (A) None Seen /HPF   ,       RADIOLOGY:  Interpreted by Radiologist unless otherwise specified  CT HEAD WO CONTRAST   Final Result   No acute intracranial abnormality. CT ABDOMEN PELVIS WO CONTRAST Additional Contrast? None   Final Result   Right adrenal mass measuring approximately 4.3 cm in diameter, increased in   the interval since the prior examination. Indeterminate lesion, malignancy   must be suspected and cannot be excluded. Surgical consultation recommended. Consider biochemical evaluation for functional status and pheochromocytoma   prior to resection. Splenomegaly.       Atelectatic changes in the right lung base with no airspace consolidation or   pleural effusions. Perinephric stranding bilaterally, left greater than right which may be   chronic. Renal inflammatory process cannot be excluded. No urolithiasis or   obstructive uropathy. XR CHEST PORTABLE   Final Result   Mild CHF. US RETROPERITONEAL COMPLETE    (Results Pending)         EKG Interpretation  Interpreted by emergency department physician, Dr. Alexa Arana, rate 144, no STEMI      ------------------------- NURSING NOTES AND VITALS REVIEWED ---------------------------   The nursing notes within the ED encounter and vital signs as below have been reviewed by myself  BP (!) 149/78   Pulse 122   Temp 98.7 °F (37.1 °C)   Resp 16   LMP  (LMP Unknown)   SpO2 94%     Oxygen Saturation Interpretation: Normal    The patients available past medical records and past encounters were reviewed.         ------------------------------ ED COURSE/MEDICAL DECISION MAKING----------------------  Medications   albuterol (PROVENTIL) nebulizer solution 2.5 mg (has no administration in time range)   apixaban (ELIQUIS) tablet 5 mg (has no administration in time range)   calcium carbonate (TUMS) chewable tablet 500 mg (has no administration in time range)   dilTIAZem (CARDIZEM CD) extended release capsule 240 mg (has no administration in time range)   escitalopram (LEXAPRO) tablet 20 mg (has no administration in time range)   gabapentin (NEURONTIN) capsule 300 mg (has no administration in time range)   insulin glargine (LANTUS) injection vial 40 Units (has no administration in time range)   pantoprazole (PROTONIX) tablet 40 mg (has no administration in time range)   metoprolol succinate (TOPROL XL) extended release tablet 100 mg (has no administration in time range)   miconazole (MICOTIN) 2 % powder (has no administration in time range)   montelukast (SINGULAIR) tablet 10 mg (has no administration in time range)   traZODone (DESYREL) tablet 50 mg (has no arrival.  Did receive IV fluids along with Tylenol. Blood and urine cultures obtained. Labs and imaging reviewed. Given her CT findings, patient was started on Rocephin. On reevaluation, she is resting comfortably. Remains hemodynamically stable. Discussed with the hospitalist, patient will be admitted. Counseling: The emergency provider has spoken with the patient and discussed todays results, in addition to providing specific details for the plan of care and counseling regarding the diagnosis and prognosis. Questions are answered at this time and they are agreeable with the plan.       --------------------------------- IMPRESSION AND DISPOSITION ---------------------------------    IMPRESSION  1. Altered mental status, unspecified altered mental status type    2. YEIMI (acute kidney injury) ([facility] [address]    3. Pyelonephritis    4. Urinary tract infection without hematuria, site unspecified        DISPOSITION  Disposition: Admit to telemetry  Patient condition is stable        NOTE: This report was transcribed using voice recognition software.  Every effort was made to ensure accuracy; however, inadvertent computerized transcription errors may be present       Kuldeep Lockett MD  04/29/21 1925

## 2021-04-30 NOTE — ED NOTES
Call light on, entered room, patient asked this nurse for \"a container to put my gator in\". Patient currently watching tv show about alligators, reoriented patient who then laughed \"there isn't a gator in here? \"      Elenita Camp RN  04/30/21 0640 Principal Discharge DX:	Dermatitis

## 2021-04-30 NOTE — PROGRESS NOTES
assessed O2 in bed s/p transfer from EOB. O2=81% on 3L, educated pt on PLB and increased O2 to 5L. O2 recovered to 92% in 1-2 minutes. RN aware. Fair+   Visual/  Perceptual Glasses: readers        Safety          Hand dominance: R     ROM Strength  Additional Info:    RUE  Proximally: limited to mid range  Distally: WFL Proximally: 3-/5  Distally: 4-/5 good  and wfl FMC/dexterity noted during ADL tasks       LUE WFL 4-/5 good  and wfl FMC/dexterity noted during ADL tasks         Hearing: WFL   Sensation:  No c/o numbness or tingling   Tone: WFL   Edema: BLEs edema noted            Treatment: Upon arrival, patient lying in bed and agreeable to OT session at this time. RN approved. Therapist facilitated bed mobility(educated pt on hand placement during transfer/rolling task in prep for ADLs)--- skilled cuing on hand placement, posture, body mechanics and safety. Therapist facilitated self-care retraining: UB/LB self-care tasks(gown-cueing on sequencing of task/safety d/t lines), simulated toileting task and simulated grooming tasks(bed level--d/t increased pain with partial sit to EOB) while educating pt on modified techniques, posture, safety and energy conservation techniques. Skilled monitoring of HR, O2 sats and pts response to treatment. Pt educated on PLB and rest breaks. At end of session, patient lying in bed (re-positioned for comfort) with call light and phone within reach, all lines and tubes intact. RN present and pt left in their care at end of session. Comments:  Overall pt demonstrated decreased independence and safety during completion of ADL/functional transfers/mobility tasks. Pt demonstrating fair understanding of education/techniques, requiring additional training / education. Pt would benefit from continued skilled OT to increase functional independence and quality of life.       Eval Complexity: Mod  mod  Profile and History- med (extensive chart review)  Assessment of Occupational Performance and Identification of Deficits- med  Clinical Decision Making- med    Assessment of current deficits   Functional mobility [x]  ADLs [x] Strength [x]  Cognition [x]  Functional transfers  [x] IADLs [x] Safety Awareness [x]  Endurance [x]  Fine Motor Coordination [x] Balance [x] Vision/perception [] Sensation [x]   Gross Motor Coordination [] ROM [x] Delirium [x]                  Motor Control []    Plan of Care: 1-3 days/week for 1-2 weeks PRN   Instruction/training on adapted ADL techniques and AE recommendations to increase functional independence within precautions  Training on energy conservation strategies/techniques to improve independence/tolerance for self-care routine  Functional transfer/mobility training/DME recommendations for increased independence, safety, and fall prevention  Patient/Family education to increase follow through with safety techniques and functional independence  Recommendation of environmental modifications for increased safety with functional transfers/mobility and ADLs  Cognitive retraining/development of therapeutic activities to improve problem solving, judgement, memory, and attention for increased safety/participation in ADL/IADL tasks  Sensory re-education to improve body/limb awareness, maintain/improve skin integrity, and improve hand/UE motor function  Visual-perceptual training to improve environmental scanning, visual attention/focus, and oculomotor skills for increased safety/independence with functional transfers/mobility and ADLs  Splinting/positioning for increased function, prevention of contractures, and improve skin integrity  Therapeutic exercise to improve motor endurance, ROM, and functional strength for ADLs/functional transfers  Therapeutic activities to facilitate/challenge dynamic balance, stand tolerance, fine motor dexterity/in-hand manipulation for increased independence with ADLs  Neuro-muscular re-education: facilitation of righting/equilibrium reactions, midline orientation, scapular stability/mobility, normalization of muscle tone, and facilitation of volitional active controled movement  Delirium prevention/treatment  Positioning to improve functional independence      Rehab Potential:  Good for established goals     Patient / Family Goal: not stated      Patient and/or family were instructed on functional diagnosis, prognosis/goals and OT plan of care. Demonstrated fair understanding. Time In: 2:35  Time Out: 3:00  Total Treatment Time: 10 minutes    Min Units   OT Eval Low 24831       OT Eval Medium 97166  x 1   OT Eval High 57087       OT Re-Eval [id number]       Therapeutic Ex 66276       Therapeutic Activities 03449       ADL/Self Care 81744  10  1   Orthotic Management 83823       Neuro Re-Ed 30562       Non-Billable Time          Evaluation Time includes thorough review of current medical information, gathering information on past medical history/social history and prior level of function, completion of standardized testing/informal observation of tasks, assessment of data and education on plan of care and goals.     Misty Murillo OTR/L #[id number]

## 2021-04-30 NOTE — ED NOTES
Sheets changed, patient repositioned in bed, warm blankets provided. Patient upset \"I want a warm room! \"      Clary Ponce RN  04/30/21 0235

## 2021-04-30 NOTE — CARE COORDINATION
Social Work [address] [place] [address] Planning:    Pt presents to the ED secondary to altered mental status. Pt is from [address] [place] [address]. SW met with pt who reports plan will be to return to [facility] upon discharge. Per liaison, pt is able to return upon discharge but will need PT/OT evals and insurance authorization prior to discharge. SW/CM to follow.

## 2021-04-30 NOTE — ED NOTES
Patient continues to deny cp, hr btwn 140-150, new ekg obtained and shown to ER attending, Dr. Marcos Olivares, gave metoprolol per Luke Rosas, RN  04/30/21 0895

## 2021-04-30 NOTE — PROGRESS NOTES
Patient is still sustaining hr in the 140s. One time dose of cardizem given. Will cont to monitor.  If after 15-20 minutes hr doesn't drop start cardizem drip at 5ml/hr

## 2021-04-30 NOTE — ED NOTES
Received a call from the microbiology lab regarding Quita Alatorre. Blood culture growing gram negative rods in 3/4 bottles (E. Coli by PCR). The patient is currently admitted to the hospital and on IV cefepime. Blood culture results called into Dr. Tawana Styles answering machine on [employer]. No need for further intervention at this time.     Shannan Schultz, PharmD, [facility] 4/30/2021 1:34 PM  Clinical Pharmacy Specialist, Emergency Medicine  [phone]

## 2021-05-01 LAB
METER GLUCOSE: 361 MG/DL (ref 74–99)
METER GLUCOSE: 402 MG/DL (ref 74–99)
METER GLUCOSE: 430 MG/DL (ref 74–99)
METER GLUCOSE: 439 MG/DL (ref 74–99)

## 2021-05-01 PROCEDURE — 6370000000 HC RX 637 (ALT 250 FOR IP): Performed by: INTERNAL MEDICINE

## 2021-05-01 PROCEDURE — 6360000002 HC RX W HCPCS: Performed by: INTERNAL MEDICINE

## 2021-05-01 PROCEDURE — 2060000000 HC ICU INTERMEDIATE R&B

## 2021-05-01 PROCEDURE — 2700000000 HC OXYGEN THERAPY PER DAY

## 2021-05-01 PROCEDURE — 2580000003 HC RX 258: Performed by: INTERNAL MEDICINE

## 2021-05-01 PROCEDURE — 94640 AIRWAY INHALATION TREATMENT: CPT

## 2021-05-01 PROCEDURE — APPSS60 APP SPLIT SHARED TIME 46-60 MINUTES: Performed by: CLINICAL NURSE SPECIALIST

## 2021-05-01 PROCEDURE — 82962 GLUCOSE BLOOD TEST: CPT

## 2021-05-01 PROCEDURE — 2500000003 HC RX 250 WO HCPCS: Performed by: INTERNAL MEDICINE

## 2021-05-01 PROCEDURE — 6370000000 HC RX 637 (ALT 250 FOR IP): Performed by: PHYSICIAN ASSISTANT

## 2021-05-01 PROCEDURE — 6370000000 HC RX 637 (ALT 250 FOR IP): Performed by: CLINICAL NURSE SPECIALIST

## 2021-05-01 PROCEDURE — 99223 1ST HOSP IP/OBS HIGH 75: CPT | Performed by: INTERNAL MEDICINE

## 2021-05-01 RX ORDER — EZETIMIBE 10 MG/1
10 TABLET ORAL NIGHTLY
Status: DISCONTINUED | OUTPATIENT
Start: 2021-05-01 | End: 2021-05-06 | Stop reason: HOSPADM

## 2021-05-01 RX ADMIN — DEXTROSE MONOHYDRATE 15 MG/HR: 50 INJECTION, SOLUTION INTRAVENOUS at 16:12

## 2021-05-01 RX ADMIN — INSULIN LISPRO 9 UNITS: 100 INJECTION, SOLUTION INTRAVENOUS; SUBCUTANEOUS at 20:55

## 2021-05-01 RX ADMIN — ESCITALOPRAM 20 MG: 10 TABLET, FILM COATED ORAL at 08:32

## 2021-05-01 RX ADMIN — GABAPENTIN 300 MG: 300 CAPSULE ORAL at 08:32

## 2021-05-01 RX ADMIN — PANTOPRAZOLE SODIUM 40 MG: 40 TABLET, DELAYED RELEASE ORAL at 05:35

## 2021-05-01 RX ADMIN — ALBUTEROL SULFATE 2.5 MG: 2.5 SOLUTION RESPIRATORY (INHALATION) at 19:47

## 2021-05-01 RX ADMIN — ALBUTEROL SULFATE 2.5 MG: 2.5 SOLUTION RESPIRATORY (INHALATION) at 13:30

## 2021-05-01 RX ADMIN — ACETAMINOPHEN 650 MG: 325 TABLET ORAL at 11:45

## 2021-05-01 RX ADMIN — MAGNESIUM HYDROXIDE 30 ML: 2400 SUSPENSION ORAL at 05:51

## 2021-05-01 RX ADMIN — GABAPENTIN 300 MG: 300 CAPSULE ORAL at 20:53

## 2021-05-01 RX ADMIN — INSULIN LISPRO 18 UNITS: 100 INJECTION, SOLUTION INTRAVENOUS; SUBCUTANEOUS at 05:44

## 2021-05-01 RX ADMIN — MONTELUKAST SODIUM 10 MG: 10 TABLET, FILM COATED ORAL at 20:53

## 2021-05-01 RX ADMIN — CEFEPIME HYDROCHLORIDE 1000 MG: 1 INJECTION, POWDER, FOR SOLUTION INTRAMUSCULAR; INTRAVENOUS at 09:59

## 2021-05-01 RX ADMIN — SODIUM CHLORIDE: 9 INJECTION, SOLUTION INTRAVENOUS at 05:02

## 2021-05-01 RX ADMIN — INSULIN GLARGINE 40 UNITS: 100 INJECTION, SOLUTION SUBCUTANEOUS at 05:46

## 2021-05-01 RX ADMIN — INSULIN LISPRO 18 UNITS: 100 INJECTION, SOLUTION INTRAVENOUS; SUBCUTANEOUS at 11:45

## 2021-05-01 RX ADMIN — TRAZODONE HYDROCHLORIDE 50 MG: 50 TABLET ORAL at 20:53

## 2021-05-01 RX ADMIN — SODIUM CHLORIDE: 9 INJECTION, SOLUTION INTRAVENOUS at 16:14

## 2021-05-01 RX ADMIN — ALBUTEROL SULFATE 2.5 MG: 2.5 SOLUTION RESPIRATORY (INHALATION) at 10:30

## 2021-05-01 RX ADMIN — APIXABAN 5 MG: 5 TABLET, FILM COATED ORAL at 20:53

## 2021-05-01 RX ADMIN — CEFTRIAXONE SODIUM 2000 MG: 2 INJECTION, POWDER, FOR SOLUTION INTRAMUSCULAR; INTRAVENOUS at 22:12

## 2021-05-01 RX ADMIN — EZETIMIBE 10 MG: 10 TABLET ORAL at 20:53

## 2021-05-01 RX ADMIN — MICONAZOLE NITRATE: 20.6 POWDER TOPICAL at 20:57

## 2021-05-01 RX ADMIN — INSULIN LISPRO 15 UNITS: 100 INJECTION, SOLUTION INTRAVENOUS; SUBCUTANEOUS at 16:29

## 2021-05-01 RX ADMIN — MICONAZOLE NITRATE: 20.6 POWDER TOPICAL at 08:31

## 2021-05-01 RX ADMIN — DEXTROSE MONOHYDRATE 15 MG/HR: 50 INJECTION, SOLUTION INTRAVENOUS at 09:59

## 2021-05-01 RX ADMIN — METOPROLOL SUCCINATE 100 MG: 100 TABLET, EXTENDED RELEASE ORAL at 08:32

## 2021-05-01 RX ADMIN — DEXTROSE MONOHYDRATE 15 MG/HR: 50 INJECTION, SOLUTION INTRAVENOUS at 22:28

## 2021-05-01 RX ADMIN — ACETAMINOPHEN 650 MG: 325 TABLET ORAL at 20:56

## 2021-05-01 RX ADMIN — INSULIN GLARGINE 40 UNITS: 100 INJECTION, SOLUTION SUBCUTANEOUS at 20:55

## 2021-05-01 RX ADMIN — ALBUTEROL SULFATE 2.5 MG: 2.5 SOLUTION RESPIRATORY (INHALATION) at 06:46

## 2021-05-01 RX ADMIN — APIXABAN 5 MG: 5 TABLET, FILM COATED ORAL at 08:31

## 2021-05-01 ASSESSMENT — PAIN DESCRIPTION - PROGRESSION: CLINICAL_PROGRESSION: GRADUALLY WORSENING

## 2021-05-01 ASSESSMENT — PAIN SCALES - GENERAL
PAINLEVEL_OUTOF10: 0
PAINLEVEL_OUTOF10: 6
PAINLEVEL_OUTOF10: 7
PAINLEVEL_OUTOF10: 0

## 2021-05-01 ASSESSMENT — PAIN - FUNCTIONAL ASSESSMENT: PAIN_FUNCTIONAL_ASSESSMENT: PREVENTS OR INTERFERES SOME ACTIVE ACTIVITIES AND ADLS

## 2021-05-01 ASSESSMENT — PAIN DESCRIPTION - FREQUENCY: FREQUENCY: CONTINUOUS

## 2021-05-01 NOTE — PLAN OF CARE
Problem: Pain:  Description: Pain management should include both nonpharmacologic and pharmacologic interventions.   Goal: Pain level will decrease  Description: Pain level will decrease  5/1/2021 0422 by Elda Burroughs RN  Outcome: Met This Shift     Problem: Skin Integrity:  Goal: Will show no infection signs and symptoms  Description: Will show no infection signs and symptoms  5/1/2021 0422 by Elda Burroughs RN  Outcome: Met This Shift     Problem: Falls - Risk of:  Goal: Absence of physical injury  Description: Absence of physical injury  5/1/2021 0422 by Elda Burroughs RN  Outcome: Met This Shift

## 2021-05-01 NOTE — CONSULTS
sphincterotomy for chronic anal fissure, Dr. Dat Reaves, [facility]  4/18/2012    anal exam and injection of Botox 100 units into anal sphincter, Laila Acosta, [facility]    APPENDECTOMY  1965   [address]    COLONOSCOPY  1/20/2004    cecal polyp, bx (no pathologic changes), Dr. Gino Ford, [facility] COLONOSCOPY  8/11/2006    snare polypectomy terminal ileum polyp (granulation tissue polyp) and anal polyp (inflammatory/papilla), Dr. Shila Hanson, [facility] COLONOSCOPY  3/23/2012    bx/cauterization proximal ascending colon polyp, injection of anal sphincter with Botox, Dr. Shila Hanson, [address] [facility], COLON, DIAGNOSTIC      FRACTURE SURGERY      R leg fracture with surgery for repair    JOINT REPLACEMENT  2003    L knee    OK DEBRIDEMENT, SKIN, SUB-Q TISSUE,MUSCLE,=<20 SQ CM Left 11/30/2018    LEFT LEG EXCISIONAL  DEBRIDEMENT WITH TISSUE BIOPSY performed by Pily Knowles DPM at [id number]  [facility] ENDOSCOPY  1/20/2004    gastritis, bx (no H pylori), Dr. Christian Ee, [address] ENDOSCOPY N/A 6/1/2018    EGD BIOPSY performed by Mary Miner MD at [address] [place] [address] N/A 11/9/2018    EGD BIOPSY performed by Mary Miner MD at [facility] ENDOSCOPY         Current Medications:      Current Facility-Administered Medications   Medication Dose Route Frequency Provider Last Rate Last Admin    ezetimibe (ZETIA) tablet 10 mg  10 mg Oral Nightly Tori Benitez APRN - CNS        metoprolol succinate (TOPROL XL) extended release tablet 100 mg  100 mg Oral Daily LIV Burr   100 mg at 05/01/21 0832    dilTIAZem 100 mg in dextrose 5 % 100 mL infusion ([facility])  5-15 mg/hr Intravenous Continuous Amara Church MD 15 mL/hr at 05/01/21 0959 15 mg/hr at 05/01/21 0959    insulin lispro (HUMALOG) injection vial 0-18 Units  0-18 Units Subcutaneous TID  Karley Sprague MD   18 Units at 05/01/21 1145    insulin lispro (HUMALOG) injection vial 0-9 Units  0-9 Units Subcutaneous Nightly Karley Sprague MD   9 Units at 04/30/21 2144    white petrolatum ointment   Topical BID PRN Karley Sprague MD        albuterol (PROVENTIL) nebulizer solution 2.5 mg  2.5 mg Nebulization Q4H PRN Silvia Peters MD   2.5 mg at 05/01/21 1030    apixaban (ELIQUIS) tablet 5 mg  5 mg Oral BID Silvia Peters MD   5 mg at 05/01/21 0831    calcium carbonate (TUMS) chewable tablet 500 mg  500 mg Oral TID PRN Silvia Peters MD        dilTIAZem (CARDIZEM CD) extended release capsule 240 mg  240 mg Oral BID Silvia Peters MD   Stopped at 04/30/21 1915    escitalopram (LEXAPRO) tablet 20 mg  20 mg Oral Daily Silvia Peters MD   20 mg at 05/01/21 7340    gabapentin (NEURONTIN) capsule 300 mg  300 mg Oral TID Silvia Peters MD   300 mg at 05/01/21 9234    insulin glargine (LANTUS) injection vial 40 Units  40 Units Subcutaneous BID Silvia Peters MD   40 Units at 05/01/21 0546    pantoprazole (PROTONIX) tablet 40 mg  40 mg Oral QAM AC Silvia Peters MD   40 mg at 05/01/21 0535    miconazole (MICOTIN) 2 % powder   Topical BID Silvia Peters MD   Given at 05/01/21 0831    montelukast (SINGULAIR) tablet 10 mg  10 mg Oral Nightly Silvia Peters MD   10 mg at 04/30/21 2134    traZODone (DESYREL) tablet 50 mg  50 mg Oral Nightly Silvia Peters MD   50 mg at 04/30/21 2134    glucose (GLUTOSE) 40 % oral gel 15 g  15 g Oral PRN Silvia Peters MD        dextrose 50 % IV solution  12.5 g Intravenous PRN Silvia Peters MD        glucagon (rDNA) injection 1 mg  1 mg Intramuscular PRN Silvia Peters MD        dextrose 5 % solution  100 mL/hr Intravenous PRN Silvia Peters MD        0.9 % sodium chloride infusion   Intravenous Continuous Silvia Peters MD 75 mL/hr at 05/01/21 0502 New Bag at 05/01/21 0502    sodium chloride flush 0.9 % injection 5-40 mL  5-40 mL Intravenous 2 times per day Antonio Epstein MD   10 mL at 21 2134    sodium chloride flush 0.9 % injection 5-40 mL  5-40 mL Intravenous PRN Antonio Epstein MD        0.9 % sodium chloride infusion  25 mL Intravenous PRN Antonio Epstein MD        acetaminophen (TYLENOL) tablet 650 mg  650 mg Oral Q6H PRN Antonio Epstein MD   650 mg at 21 1145    Or    acetaminophen (TYLENOL) suppository 650 mg  650 mg Rectal Q6H PRN Antonio Epstein MD        magnesium hydroxide (MILK OF MAGNESIA) 400 MG/5ML suspension 30 mL  30 mL Oral Daily PRN Antonio Epstein MD   30 mL at 21 0551    promethazine (PHENERGAN) tablet 12.5 mg  12.5 mg Oral Q6H PRN Antonio Epstein MD        Or    ondansetron [facility]) injection 4 mg  4 mg Intravenous Q6H PRN Antonio Epstein MD        cefepime (MAXIPIME) 1000 mg IVPB extended (mini-bag)  1,000 mg Intravenous Q12H Antonio Epstein MD   Stopped at 21 1029       Allergies:  Statins    Social History:    Social History     Socioeconomic History    Marital status:      Spouse name: Not on file    Number of children: 3    Years of education: 9    Highest education level: 9th grade   Occupational History    Occupation: SSD   Social Needs    Financial resource strain: Not hard at all   [place] insecurity     Worry: Never true     Inability: Never true    Transportation needs     Medical: Yes     Non-medical: No   Tobacco Use    Smoking status: Former Smoker     Packs/day: 1.50     Years: 25.00     Pack years: 37.50     Types: Cigarettes     Start date: 1979     Quit date: 2004     Years since quittin.3    Smokeless tobacco: Never Used    Tobacco comment: Stopped smoking 16 years ago   Substance and Sexual Activity    Alcohol use: Not Currently     Frequency: Never     Comment: 1 can coke daily    Drug use: Not Currently    Sexual activity: Not Currently     Partners: Male Lifestyle    Physical activity     Days per week: 0 days     Minutes per session: 0 min    Stress: Not at all   Relationships    Social connections     Talks on phone: More than three times a week     Gets together: More than three times a week     Attends [employer] service: Never     Active member of club or organization: No     Attends meetings of clubs or organizations: Never     Relationship status:     Intimate partner violence     Fear of current or ex partner: Not on file     Emotionally abused: Not on file     Physically abused: Not on file     Forced sexual activity: Not on file   Other Topics Concern    Not on file   Social History Narrative    Denies caffeine. Grandson shops for her and helps around the house    Patient unable to exercise d/t mobility status     Patient lives with her  who has limited speech ability d/t hx of CVA. He is able to assist patient with IADLS         Family History:     Family History   Problem Relation Age of Onset    Heart Disease Mother     Diabetes Father    Enrrique Milligan Father     Other Father         BLINDNESS    Colon Cancer Sister     Other Sister         shingles- has had over 1 year    Diabetes Paternal Aunt     Diabetes Paternal Uncle     Diabetes Paternal Aunt     Diabetes Paternal Uncle     Diabetes Sister        REVIEW OF SYSTEMS:    CONSTITUTIONAL: fever   HEENT: denies blurring of vision or double vision, denies hearing problem  RESPIRATORY: cough, shortness of breath  CARDIOVASCULAR:  Denies palpitation  GASTROINTESTINAL:  Denies abdomen pain, diarrhea or constipation. GENITOURINARY:  Denies burning urination or frequency of urination  INTEGUMENT: denies wound , rash  HEMATOLOGIC/LYMPHATIC:  Denies lymph node swelling, gum bleeding or easy bruising.   MUSCULOSKELETAL:Chronic leg swelling   NEUROLOGICAL:  Denies light headed, dizziness, loss of consciousness      PHYSICAL EXAM:      Vitals:     /62   Pulse 124   Temp 98 °F (36.7 °C) (Temporal)   Resp 24   LMP  (LMP Unknown)   SpO2 94%     General Appearance:    Awake, alert , no acute distress  Morbidly obese . Head:    Normocephalic, atraumatic   Eyes:    No pallor, no icterus,   Ears:    No obvious deformity or drainage.    Nose:   No nasal drainage   Throat:   Mucosa moist, no oral thrush   Neck:   Supple, no lymphadenopathy   Back:     Right CVA tenderness   Lungs:     Crackles at the bases    Heart:    Irregular    Abdomen:     Soft, non-tender, bowel sounds present    Extremities:   Bilateral chronic lymphedema, non tender    Pulses:   Dorsalis pedis not  palpable    Skin:   no rashes or lesions     CBC with Differential:      Lab Results   Component Value Date    WBC 8.4 04/30/2021    RBC 3.79 04/30/2021    HGB 9.0 04/30/2021    HCT 29.9 04/30/2021    PLT 91 04/30/2021    MCV 78.9 04/30/2021    MCH 23.7 04/30/2021    MCHC 30.1 04/30/2021    RDW 15.6 04/30/2021    NRBC 0.0 12/04/2018    SEGSPCT 85 01/29/2014    METASPCT 3.5 12/04/2018    LYMPHOPCT 7.4 04/29/2021    MONOPCT 5.5 04/29/2021    MYELOPCT 2.6 12/04/2018    BASOPCT 0.2 04/29/2021    MONOSABS 0.51 04/29/2021    LYMPHSABS 0.69 04/29/2021    EOSABS 0.02 04/29/2021    BASOSABS 0.02 04/29/2021       CMP     Lab Results   Component Value Date     04/29/2021    K 4.2 04/29/2021    K 3.9 04/13/2021    CL 87 04/29/2021    CO2 30 04/29/2021    BUN 47 04/29/2021    CREATININE 1.8 04/29/2021    GFRAA 34 04/29/2021    LABGLOM 28 04/29/2021    GLUCOSE 367 04/30/2021    GLUCOSE 181 12/16/2011    PROT 6.5 04/29/2021    LABALBU 3.2 04/29/2021    LABALBU 4.5 11/02/2011    CALCIUM 8.7 04/29/2021    BILITOT 0.4 04/29/2021    ALKPHOS 76 04/29/2021    AST 19 04/29/2021    ALT 10 04/29/2021         Hepatic Function Panel:    Lab Results   Component Value Date    ALKPHOS 76 04/29/2021    ALT 10 04/29/2021    AST 19 04/29/2021    PROT 6.5 04/29/2021    BILITOT 0.4 04/29/2021    BILIDIR <0.2 12/26/2019    IBILI see below 12/26/2019    LABALBU 3.2 04/29/2021    LABALBU 4.5 11/02/2011       PT/INR:    Lab Results   Component Value Date    PROTIME 19.5 04/29/2021    INR 1.8 04/29/2021       TSH:    Lab Results   Component Value Date    TSH 0.575 01/15/2021       U/A:    Lab Results   Component Value Date    NITRITE negative 07/26/2018    COLORU Yellow 04/29/2021    PHUR 5.0 04/29/2021    WBCUA 5-10 04/29/2021    RBCUA 10-20 04/29/2021    YEAST Present 05/28/2020    BACTERIA MANY 04/29/2021    CLARITYU CLOUDY 04/29/2021    SPECGRAV >=1.030 04/29/2021    LEUKOCYTESUR MODERATE 04/29/2021    UROBILINOGEN 0.2 04/29/2021    BILIRUBINUR SMALL 04/29/2021    BILIRUBINUR negative 07/26/2018    BLOODU LARGE 04/29/2021    GLUCOSEU Negative 04/29/2021    AMORPHOUS FEW 08/14/2020       ABG:  No results found for: Hardy Deny, C0NMBSIL, PHART, THGBART, ISV2YYK, PO2ART, TOP6UPZ    MICROBIOLOGY:    Blood culture -    Bottle Type Aerobic     Source of Blood Culture Antecubital-Lef    Order Number [id number]    Enterobacter cloacae complex by PCR Not Detected    Escherichia coli by PCR DETECTEDPanic      Klebsiella oxytoca by PCR Not Detected    Klebsiella pneumoniae group by PCR Not Detected    Proteus species by PCR Not Detected    Streptococcus agalactiae by PCR Not Detected    Staphylococcus aureus by PCR Not Detected    Serratia marcescens by PCR Not Detected    Streptococcus pneumoniae by PCR Not Detected    Streptococcus pyogenes  by PCR Not Detected    Acinetobacter baumannii by PCR Not Detected    Candida albicans by PCR Not Detected    Candida glabrata by PCR Not Detected    Candida krusei by PCR Not Detected    Candida parapsilosis by PCR Not Detected    Candida tropicalis by PCR Not Detected     Enterobacteriaceae by PCR DETECTEDPanic      Enterococcus by PCR Not Detected    Haemophilus Influenzae by PCR Not Detected    Listeria monocytogenes by PCR Not Detected    Neisseria meningitidis by PCR Not Detected    Pseudomonas aeruginosa by PCR Not Detected    Staphylococcus species by PCR Not Detected    Streptococcus species by PCR Not Detected    Carbapenem Resistance KPC by PCR Not Detected   Narrative:       COVID 19 negative         Urine Culture -      Radiology :    Chest X ray- mild congestion     CT scan of abdomen and pelvis -  Impression:        Right adrenal mass measuring approximately 4.3 cm in diameter, increased in   the interval since the prior examination.  Indeterminate lesion, malignancy   must be suspected and cannot be excluded.  Surgical consultation recommended. Consider biochemical evaluation for functional status and pheochromocytoma   prior to resection. Splenomegaly. Atelectatic changes in the right lung base with no airspace consolidation or   pleural effusions. Perinephric stranding bilaterally, left greater than right which may be   chronic.  Renal inflammatory process cannot be excluded.  No urolithiasis or   obstructive uropathy. IMPRESSION:     1. E coli UTI , sepsis, bacteremia, pyelonephritis   2. Lactic acidosis   3. Morbid obesity, chronic lymphedema   4. Resp failure, CHF,COPD       RECOMMENDATIONS:      1. Rocephin  2 grams IV q 24 hrs,   2.  Urine cx   3.lactic acid     Thank you Dr Russ Colorado for the consult

## 2021-05-01 NOTE — PROGRESS NOTES
Subjective: The patient is awake and alert. Feeling much better today   o acute complainnts     Objective:    /61   Pulse 104   Temp 97.8 °F (36.6 °C) (Temporal)   Resp 16   LMP  (LMP Unknown)   SpO2 95%     In: 2528.2 [P.O.:600; I.V.:1878.2]  Out: 950   In: 2528.2   Out: 950 [Urine:950]    General appearance: NAD, conversant  HEENT: AT/NC, MMM  Neck: FROM, supple  Lungs: Clear to auscultation  CV: irref , no MRGs  Vasc: Radial pulses 2+  Abdomen: Soft, non-tender; no masses or HSM  Extremities: bl legs with lymphedema and icthyosis   Skin: no rash, lesions or ulcers  Psych: Alert and oriented to person, place and time  Neuro: Alert and interactive     Recent Labs     04/29/21  1614 04/30/21  0652   WBC 9.3 8.4   HGB 10.2* 9.0*   HCT 34.1 29.9*   * 91*       Recent Labs     04/29/21  1614      K 4.2   CL 87*   CO2 30*   BUN 47*   CREATININE 1.8*   CALCIUM 8.7       Assessment:    Active Problems:    Anxiety and depression    Chronic anticoagulation    Hypertension    Hyperlipidemia    PAF (paroxysmal atrial fibrillation) ([facility]) - currently in SR    Pulmonary hypertension    Diabetic neuropathy ([facility])    YEIMI (acute kidney injury) ([facility])    Chronic diastolic heart failure ([facility])    Lymphedema of both lower extremities    Chronic anemia    Gastroesophageal reflux disease without esophagitis    UTI (urinary tract infection)  Resolved Problems:    * No resolved hospital problems. *      Plan:    Admit to telemetry for evaluation of acute hypoxemic respiratory failure associated with morbid obesity, altered mental status with positive blood cultures with gram-negative rods 3 out of 4 bottles- pyelonephritis    Aggressive IV fluid hydration  IV cefepime pending final culture results and sensitivities  Cefepime changed to Rocephin by ID - E.  Coli   Continue BiPAP as at home  Continue nasal cannula as at home     Atrial fibrillation with RVR  Likely secondary to bacteremia/stress state  Resume home medications incl doac   Cardizem drip, po metoprolol  Possible dccv  Cardiology following     Am labs     DVT Prophylaxis   PT/OT  Discharge Libra Siu MD  3:53 PM  5/1/2021

## 2021-05-01 NOTE — PROGRESS NOTES
Comprehensive Nutrition Assessment    Type and Reason for Visit:  Initial, Positive Nutrition Screen    Nutrition Recommendations/Plan: Continue current diet, Start Ensure HP BID    Nutrition Assessment:  Pt adm w/ UTI. Hx lymphedema, COPD, CHF, DM. Noted open wounds pending wound eval. Will provide ONS and monitor    Malnutrition Assessment:  Malnutrition Status:  No malnutrition    Context:  Acute Illness     Findings of the 6 clinical characteristics of malnutrition:  Energy Intake:  Mild decrease in energy intake (Comment)  Weight Loss:  Unable to assess(d/t fluid shifts w/ CHF/lymphedema)     Body Fat Loss:  No significant body fat loss     Muscle Mass Loss:  No significant muscle mass loss    Fluid Accumulation:  No significant fluid accumulation     Strength:  Not Performed    Estimated Daily Nutrient Needs:  Energy (kcal):  (MSJ 1738 x 1.2 SF);  Weight Used for Energy Requirements:  Current     Protein (g):  115-130; Weight Used for Protein Requirements:  Ideal(2.2-2.5)        Fluid (ml/day):  ; Method Used for Fluid Requirements:  1 ml/kcal      Nutrition Related Findings:  pt sleeping soundly, soft abd, active BS, +3 pitting edema, -I/Os      Wounds:  Multiple, Open Wounds, Wound Consult Pending       Current Nutrition Therapies:    DIET GENERAL; Carb Control: 4 carb choices (60 gms)/meal    Anthropometric Measures:  · Height: 5' 3\" (160 cm)  · Current Body Weight: 272 lb (123.4 kg)(estimated, measured per EMR 4/17/21, UTO updated wt pt sleeping in bedside chair on assessment)   · Usual Body Weight: (260-296# measured per EMR x1 year)     · Ideal Body Weight: 115 lbs; % Ideal Body Weight 236.5 %   · BMI: 48.2  · BMI Categories: Obese Class 3 (BMI 40.0 or greater)       Nutrition Diagnosis:   · Increased nutrient needs related to increase demand for energy/nutrients as evidenced by wounds    Nutrition Interventions:   Food and/or Nutrient Delivery:  Continue Current Diet, Start Oral

## 2021-05-01 NOTE — H&P
Luz Elena Polanco M.D. History and Physical      CHIEF COMPLAINT: Shortness of breath and weakness    Reason for Admission: As above    History Obtained From: Patient/EMR    HISTORY OF PRESENT ILLNESS:      The patient is a 77 y.o. female of [address] with significant past medical history of COPD oxygen dependent at 2 L, atrial fibrillation on oral anticoagulation with Eliquis, CHF, morbid obesity, hypertension hyperlipidemia who presents with complaints of ongoing shortness of breath for several weeks. Patient indicates her breathing got bad to the point that increasing her oxygen level was not helping her. She therefore came into the emergency department for further evaluation after calling the ambulance. Emergency department notes indicate that patient was altered mental status. She has been cold but denies any shaking chills or fevers. On my evaluation she is resting comfortably no apparent acute distress.      All labs personally reviewed   All imaging personally reviewed     Past Medical History:        Diagnosis Date    (HFpEF) heart failure with preserved ejection fraction ([facility] [address]     1/16/2018- echo- LVEF 55-65%    Anal fissure     Anemia 10/6/2017    Anxiety and depression     Arthritis     Asthma     Atrial fibrillation (HCC)     Eliquis    Blood transfusion     long time no reaction    CHF (congestive heart failure) (HCC)     Diastolic    COPD (chronic obstructive pulmonary disease) (HCC)     Disc disorder     DM2 (diabetes mellitus, type 2) ([facility] [address]     on insulin    Fall due to stumbling 10/6/2017    GERD (gastroesophageal reflux disease)     History of blood transfusion     Hx of blood clots     Hyperlipidemia     Hypertension     Inappropriate sinus tachycardia     LVH (left ventricular hypertrophy)     moderate    Lymphadenitis, chronic 4/13/2018    Occipital neuralgia 11/2/2011    Respiratory acidosis 10/7/2017    Sinus tachycardia 2/16/2015     Past Surgical History:        Procedure Laterality Date    ANOSCOPY  10/25/2006    injection of anal sphincter with Botox, Franklyn Ortiz/Timmy, [facility]    ANOSCOPY  4/9/2007    injection of anal sphincter with Botox, Dr. Santiago Huggins, [address] [place] [address] ANOSCOPY  6/13/2007    injection of anal sphincter with Botox, Franklyn Obrien [facility]    ANUS SURGERY  4/4/2006    Lateral sphincterotomy for chronic anal fissure, Dr. Iliana Celestin, [facility]  4/18/2012    anal exam and injection of Botox 100 units into anal sphincter, Laila Obrien, [facility]    APPENDECTOMY  1965   [address]    COLONOSCOPY  1/20/2004    cecal polyp, bx (no pathologic changes), Dr. Monica Coulter, [facility] COLONOSCOPY  8/11/2006    snare polypectomy terminal ileum polyp (granulation tissue polyp) and anal polyp (inflammatory/papilla), Dr. Santiago Huggins, [address] [place] [address] COLONOSCOPY  3/23/2012    bx/cauterization proximal ascending colon polyp, injection of anal sphincter with Botox, Dr. Santiago Huggins, [facility]    ENDOSCOPY, COLON, DIAGNOSTIC      FRACTURE SURGERY      R leg fracture with surgery for repair    JOINT REPLACEMENT  2003    L knee    KS DEBRIDEMENT, SKIN, SUB-Q TISSUE,MUSCLE,=<20 SQ CM Left 11/30/2018    LEFT LEG EXCISIONAL  DEBRIDEMENT WITH TISSUE BIOPSY performed by Carla Gardner DPM at [address] [facility] ENDOSCOPY  1/20/2004    gastritis, bx (no H pylori), Dr. Monica Coulter, [address] ENDOSCOPY N/A 6/1/2018    EGD BIOPSY performed by Mikey Holcomb MD at [address] [place] [address] N/A 11/9/2018    EGD BIOPSY performed by Mikey Holcomb MD at [facility] ENDOSCOPY         Medications Prior to Admission:    Medications Prior to Admission: dilTIAZem (CARDIZEM CD) 240 MG extended release capsule, Take 240 mg by mouth daily  insulin lispro, 1 Unit Dial, (HUMALOG KWIKPEN) 100 UNIT/ML SOPN, Inject 35 Units into the skin 3 times daily *Plus Sliding Scale*  ipratropium (ATROVENT) 0.02 % nebulizer solution, Take 0.5 mg by nebulization 4 times daily  insulin glargine (LANTUS SOLOSTAR) 100 UNIT/ML injection pen, Inject 45 Units into the skin 2 times daily  miconazole (MICOTIN) 2 % powder, Apply topically 2 times daily Apply to breasts and abdominal folds  mineral oil-hydrophilic petrolatum (AQUAPHOR) ointment, Apply topically 3 times daily as needed for Dry Skin  promethazine (PHENERGAN) 25 MG tablet, Take 25 mg by mouth every 4 hours as needed for Nausea  acetaminophen (TYLENOL) 500 MG tablet, Take 1,000 mg by mouth every 6 hours as needed for Pain  albuterol sulfate HFA (VENTOLIN HFA) 108 (90 Base) MCG/ACT inhaler, Inhale 2 puffs into the lungs every 6 hours as needed for Wheezing  calcium carbonate (TUMS) 500 MG chewable tablet, Take 1 tablet by mouth 3 times daily as needed for Heartburn  terbinafine (LAMISIL) 250 MG tablet, Take 1 tablet by mouth daily  escitalopram (LEXAPRO) 20 MG tablet, Take 1 tablet by mouth daily  miconazole nitrate 2 % OINT, Apply topically 3 times daily as needed (after toileting)  apixaban (ELIQUIS) 5 MG TABS tablet, Take 5 mg by mouth 2 times daily  bumetanide (BUMEX) 2 MG tablet, Take 2 mg by mouth 2 times daily  gabapentin (NEURONTIN) 400 MG capsule, Take 400 mg by mouth 3 times daily.    lansoprazole (PREVACID) 30 MG delayed release capsule, Take 30 mg by mouth 2 times daily  traZODone (DESYREL) 50 MG tablet, Take 50 mg by mouth nightly  vitamin D (ERGOCALCIFEROL) 1.25 MG (79171 UT) CAPS capsule, Take 50,000 Units by mouth once a week Given Monday  ipratropium-albuterol (DUONEB) 0.5-2.5 (3) MG/3ML SOLN nebulizer solution, Take 3 mLs by nebulization every 6 hours as needed for Shortness of Breath  omega-3 acid ethyl esters (LOVAZA) 1 g capsule, Take 2 capsules by mouth 2 times daily  spironolactone (ALDACTONE) 25 MG tablet, Take 1 tablet by mouth daily  montelukast (SINGULAIR) 10 MG tablet, Take 1 tablet by mouth nightly  metoprolol succinate (TOPROL XL) 100 MG extended release tablet, Take 1 tablet by mouth daily  ferrous sulfate (IRON 325) 325 (65 Fe) MG tablet, Take 1 tablet by mouth daily (with breakfast)  acetaminophen (TYLENOL) 325 MG tablet, Take 650 mg by mouth every 6 hours as needed for Pain or Fever     Allergies:  Statins    Social History:   TOBACCO:   reports that she quit smoking about 16 years ago. Her smoking use included cigarettes. She started smoking about 41 years ago. She has a 37.50 pack-year smoking history. She has never used smokeless tobacco.  ETOH:   reports previous alcohol use. MARITAL STATUS:    OCCUPATION:      Family History:       Problem Relation Age of Onset    Heart Disease Mother     Diabetes Father     Colon Cancer Father     Other Father         BLINDNESS    Colon Cancer Sister     Other Sister         shingles- has had over 1 year    Diabetes Paternal Aunt     Diabetes Paternal Uncle     Diabetes Paternal Aunt     Diabetes Paternal Uncle     Diabetes Sister        REVIEW OF SYSTEMS:    General ROS shortness of breath and feeling cold  Hematological and Lymphatic ROS: negative  Endocrine ROS: negative  Respiratory ROS: no cough, shortness of breath, or wheezing  Cardiovascular ROS: no chest pain or dyspnea on exertion  Gastrointestinal ROS: no abdominal pain, change in bowel habits, or black or bloody stools  Genito-Urinary ROS: no dysuria, trouble voiding, or hematuria  Neurological ROS: no TIA or stroke symptoms  negative    Vitals:  BP (!) 98/56   Pulse 127   Temp 98.2 °F (36.8 °C) (Temporal)   Resp 18   LMP  (LMP Unknown)   SpO2 92%     PHYSICAL EXAM:  General:  Awake, alert, oriented X 3. Well developed, well nourished, well groomed. No apparent distress. HEENT:  Normocephalic, atraumatic. Pupils equal, round, reactive to light. No scleral icterus. No conjunctival injection. Normal lips, teeth, and gums. No nasal discharge.   Neck: Supple, FROM  Heart:  RRR, no murmurs, gallops, rubs, carotid upstroke normal, no carotid bruits  Lungs: No evidence of wheeze rales or rhonchi  Abdomen: Bowel sounds present, soft, nontender, no masses, no organomegaly, no peritoneal signs  Extremities:  No clubbing, cyanosis, or edema  Skin:  Warm and dry, no open lesions or rash  Neuro:  Cranial nerves 2-12 intact, no focal deficits  Vascular: Radial and pedal Pulses 2+  Breast: deferred  Rectal: deferred  Genitalia:  deferred      DATA:     Recent Labs     04/29/21  1614 04/30/21  0652   WBC 9.3 8.4   HGB 10.2* 9.0*   * 91*     Recent Labs     04/29/21  1614      K 4.2   BUN 47*   CREATININE 1.8*     Recent Labs     04/29/21  1614   PROT 6.5   INR 1.8     Recent Labs     04/29/21  1614   AST 19   ALT 10   ALKPHOS 76   BILITOT 0.4     No results for input(s): BNP in the last 72 hours. Recent Labs     04/29/21  1614 04/30/21  0652   TROPONINI 0.02 0.02       ASSESSMENT:      Active Problems:    Anxiety and depression    Chronic anticoagulation    Hypertension    Hyperlipidemia    PAF (paroxysmal atrial fibrillation) ([facility]) - currently in SR    Pulmonary hypertension    Diabetic neuropathy ([facility])    YEMII (acute kidney injury) ([facility] [address]    Chronic diastolic heart failure ([facility])    Lymphedema of both lower extremities    Chronic anemia    Gastroesophageal reflux disease without esophagitis    UTI (urinary tract infection)  Resolved Problems:    * No resolved hospital problems.  *        PLAN:    Admit to telemetry for evaluation of acute hypoxemic respiratory failure associated with morbid obesity, altered mental status with positive blood cultures with gram-negative rods 3 out of 4 bottles    Aggressive IV fluid hydration  IV cefepime pending final culture results and sensitivities  Continue BiPAP as at home  Continue nasal cannula as at home    Atrial fibrillation with RVR  Likely secondary to bacteremia/stress state  Resume home medications  Cardizem drip  Cardiology evaluation of uncontrolled A. fib      DVT prophylaxis  PT OT  Discharge plan    Thea Howard MD  4/30/2021  8:36 PM

## 2021-05-02 LAB
METER GLUCOSE: 311 MG/DL (ref 74–99)
METER GLUCOSE: 357 MG/DL (ref 74–99)
METER GLUCOSE: 408 MG/DL (ref 74–99)
METER GLUCOSE: 428 MG/DL (ref 74–99)

## 2021-05-02 PROCEDURE — 6370000000 HC RX 637 (ALT 250 FOR IP): Performed by: CLINICAL NURSE SPECIALIST

## 2021-05-02 PROCEDURE — 82962 GLUCOSE BLOOD TEST: CPT

## 2021-05-02 PROCEDURE — 2580000003 HC RX 258: Performed by: INTERNAL MEDICINE

## 2021-05-02 PROCEDURE — 2500000003 HC RX 250 WO HCPCS: Performed by: INTERNAL MEDICINE

## 2021-05-02 PROCEDURE — 6370000000 HC RX 637 (ALT 250 FOR IP): Performed by: INTERNAL MEDICINE

## 2021-05-02 PROCEDURE — 2700000000 HC OXYGEN THERAPY PER DAY

## 2021-05-02 PROCEDURE — 6360000002 HC RX W HCPCS: Performed by: INTERNAL MEDICINE

## 2021-05-02 PROCEDURE — 99233 SBSQ HOSP IP/OBS HIGH 50: CPT | Performed by: INTERNAL MEDICINE

## 2021-05-02 PROCEDURE — 94640 AIRWAY INHALATION TREATMENT: CPT

## 2021-05-02 PROCEDURE — 6370000000 HC RX 637 (ALT 250 FOR IP): Performed by: PHYSICIAN ASSISTANT

## 2021-05-02 PROCEDURE — 2060000000 HC ICU INTERMEDIATE R&B

## 2021-05-02 RX ORDER — METOPROLOL SUCCINATE 100 MG/1
100 TABLET, EXTENDED RELEASE ORAL 2 TIMES DAILY
Status: DISCONTINUED | OUTPATIENT
Start: 2021-05-02 | End: 2021-05-06 | Stop reason: HOSPADM

## 2021-05-02 RX ADMIN — ALBUTEROL SULFATE 2.5 MG: 2.5 SOLUTION RESPIRATORY (INHALATION) at 09:42

## 2021-05-02 RX ADMIN — ALBUTEROL SULFATE 2.5 MG: 2.5 SOLUTION RESPIRATORY (INHALATION) at 13:49

## 2021-05-02 RX ADMIN — TRAZODONE HYDROCHLORIDE 50 MG: 50 TABLET ORAL at 20:09

## 2021-05-02 RX ADMIN — INSULIN LISPRO 18 UNITS: 100 INJECTION, SOLUTION INTRAVENOUS; SUBCUTANEOUS at 16:00

## 2021-05-02 RX ADMIN — INSULIN GLARGINE 40 UNITS: 100 INJECTION, SOLUTION SUBCUTANEOUS at 08:36

## 2021-05-02 RX ADMIN — SODIUM CHLORIDE: 9 INJECTION, SOLUTION INTRAVENOUS at 20:58

## 2021-05-02 RX ADMIN — DILTIAZEM HYDROCHLORIDE 240 MG: 240 CAPSULE, EXTENDED RELEASE ORAL at 20:09

## 2021-05-02 RX ADMIN — APIXABAN 5 MG: 5 TABLET, FILM COATED ORAL at 20:08

## 2021-05-02 RX ADMIN — MONTELUKAST SODIUM 10 MG: 10 TABLET, FILM COATED ORAL at 20:09

## 2021-05-02 RX ADMIN — MICONAZOLE NITRATE: 20.6 POWDER TOPICAL at 08:30

## 2021-05-02 RX ADMIN — ALBUTEROL SULFATE 2.5 MG: 2.5 SOLUTION RESPIRATORY (INHALATION) at 04:09

## 2021-05-02 RX ADMIN — GABAPENTIN 300 MG: 300 CAPSULE ORAL at 13:29

## 2021-05-02 RX ADMIN — ESCITALOPRAM 20 MG: 10 TABLET, FILM COATED ORAL at 08:28

## 2021-05-02 RX ADMIN — INSULIN GLARGINE 40 UNITS: 100 INJECTION, SOLUTION SUBCUTANEOUS at 20:06

## 2021-05-02 RX ADMIN — APIXABAN 5 MG: 5 TABLET, FILM COATED ORAL at 08:28

## 2021-05-02 RX ADMIN — GABAPENTIN 300 MG: 300 CAPSULE ORAL at 20:08

## 2021-05-02 RX ADMIN — GABAPENTIN 300 MG: 300 CAPSULE ORAL at 08:28

## 2021-05-02 RX ADMIN — ALBUTEROL SULFATE 2.5 MG: 2.5 SOLUTION RESPIRATORY (INHALATION) at 19:00

## 2021-05-02 RX ADMIN — ERTAPENEM SODIUM 1000 MG: 1 INJECTION, POWDER, LYOPHILIZED, FOR SOLUTION INTRAMUSCULAR; INTRAVENOUS at 16:10

## 2021-05-02 RX ADMIN — METOPROLOL SUCCINATE 100 MG: 100 TABLET, EXTENDED RELEASE ORAL at 20:08

## 2021-05-02 RX ADMIN — EZETIMIBE 10 MG: 10 TABLET ORAL at 20:08

## 2021-05-02 RX ADMIN — PANTOPRAZOLE SODIUM 40 MG: 40 TABLET, DELAYED RELEASE ORAL at 06:06

## 2021-05-02 RX ADMIN — PROMETHAZINE HYDROCHLORIDE 12.5 MG: 25 TABLET ORAL at 04:42

## 2021-05-02 RX ADMIN — INSULIN LISPRO 7 UNITS: 100 INJECTION, SOLUTION INTRAVENOUS; SUBCUTANEOUS at 20:07

## 2021-05-02 RX ADMIN — INSULIN LISPRO 8 UNITS: 100 INJECTION, SOLUTION INTRAVENOUS; SUBCUTANEOUS at 17:50

## 2021-05-02 RX ADMIN — INSULIN LISPRO 18 UNITS: 100 INJECTION, SOLUTION INTRAVENOUS; SUBCUTANEOUS at 06:08

## 2021-05-02 RX ADMIN — MICONAZOLE NITRATE: 20.6 POWDER TOPICAL at 20:11

## 2021-05-02 RX ADMIN — ACETAMINOPHEN 650 MG: 325 TABLET ORAL at 20:08

## 2021-05-02 RX ADMIN — DEXTROSE MONOHYDRATE 15 MG/HR: 50 INJECTION, SOLUTION INTRAVENOUS at 04:50

## 2021-05-02 RX ADMIN — SODIUM CHLORIDE, PRESERVATIVE FREE 10 ML: 5 INJECTION INTRAVENOUS at 08:29

## 2021-05-02 RX ADMIN — METOPROLOL SUCCINATE 100 MG: 100 TABLET, EXTENDED RELEASE ORAL at 08:28

## 2021-05-02 RX ADMIN — INSULIN LISPRO 12 UNITS: 100 INJECTION, SOLUTION INTRAVENOUS; SUBCUTANEOUS at 11:25

## 2021-05-02 RX ADMIN — SODIUM CHLORIDE: 9 INJECTION, SOLUTION INTRAVENOUS at 05:52

## 2021-05-02 ASSESSMENT — PAIN SCALES - GENERAL
PAINLEVEL_OUTOF10: 0
PAINLEVEL_OUTOF10: 0
PAINLEVEL_OUTOF10: 7

## 2021-05-02 NOTE — PROGRESS NOTES
Department of Internal Medicine  Infectious Diseases   Progress Note      C/C : ESBL   E coli sepsis, bacteremia     Denies fever or chills  Reports shortness of breath   Leg swelling   Afebrile     Current Facility-Administered Medications   Medication Dose Route Frequency Provider Last Rate Last Admin    metoprolol succinate (TOPROL XL) extended release tablet 100 mg  100 mg Oral BID Reji Sage MD        ezetimibe (ZETIA) tablet 10 mg  10 mg Oral Nightly Thurmon Norma, APRN - CNS   10 mg at 05/01/21 2053    cefTRIAXone (ROCEPHIN) 2,000 mg in sterile water 20 mL IV syringe  2,000 mg Intravenous Q24H Marquis Philippe MD   2,000 mg at 05/01/21 2212    insulin lispro (HUMALOG) injection vial 0-18 Units  0-18 Units Subcutaneous TID WC Marquita Still MD   12 Units at 05/02/21 1125    insulin lispro (HUMALOG) injection vial 0-9 Units  0-9 Units Subcutaneous Nightly Marquita Still MD   9 Units at 05/01/21 2055    white petrolatum ointment   Topical BID PRN Marquita Still MD        albuterol (PROVENTIL) nebulizer solution 2.5 mg  2.5 mg Nebulization Q4H PRN Donald Bautista MD   2.5 mg at 05/02/21 1179    apixaban (ELIQUIS) tablet 5 mg  5 mg Oral BID Donald Bautista MD   5 mg at 05/02/21 6319    calcium carbonate (TUMS) chewable tablet 500 mg  500 mg Oral TID PRN Donald Bautista MD        dilTIAZem (CARDIZEM CD) extended release capsule 240 mg  240 mg Oral BID Donald Bautista MD   Stopped at 04/30/21 1915    escitalopram (LEXAPRO) tablet 20 mg  20 mg Oral Daily Donald Bautista MD   20 mg at 05/02/21 3248    gabapentin (NEURONTIN) capsule 300 mg  300 mg Oral TID Donald Bautista MD   300 mg at 05/02/21 0828    insulin glargine (LANTUS) injection vial 40 Units  40 Units Subcutaneous BID Donald Bautista MD   40 Units at 05/02/21 0836    pantoprazole (PROTONIX) tablet 40 mg  40 mg Oral QAM AC Donald Bautista MD   40 mg at 05/02/21 0606    miconazole (MICOTIN) 2 % powder   Topical BID Linda Richards MD   Given at 05/02/21 0830    montelukast (SINGULAIR) tablet 10 mg  10 mg Oral Nightly Linda Richards MD   10 mg at 05/01/21 2053    traZODone (DESYREL) tablet 50 mg  50 mg Oral Nightly Linda Richards MD   50 mg at 05/01/21 2053    glucose (GLUTOSE) 40 % oral gel 15 g  15 g Oral PRN Linda Richards MD        dextrose 50 % IV solution  12.5 g Intravenous PRN Linda Richards MD        glucagon (rDNA) injection 1 mg  1 mg Intramuscular PRN Linda Richards MD        dextrose 5 % solution  100 mL/hr Intravenous PRN Linda Richards MD        0.9 % sodium chloride infusion   Intravenous Continuous Linda Richards MD 75 mL/hr at 05/02/21 0552 New Bag at 05/02/21 0552    sodium chloride flush 0.9 % injection 5-40 mL  5-40 mL Intravenous 2 times per day Linda Richards MD   10 mL at 05/02/21 0829    sodium chloride flush 0.9 % injection 5-40 mL  5-40 mL Intravenous PRN Linda Richards MD        0.9 % sodium chloride infusion  25 mL Intravenous PRN Linda Richards MD        acetaminophen (TYLENOL) tablet 650 mg  650 mg Oral Q6H PRN Linda Richards MD   650 mg at 05/01/21 2056    Or    acetaminophen (TYLENOL) suppository 650 mg  650 mg Rectal Q6H PRN Linda Richards MD        magnesium hydroxide (MILK OF MAGNESIA) 400 MG/5ML suspension 30 mL  30 mL Oral Daily PRN Linda Richards MD   30 mL at 05/01/21 0551    promethazine (PHENERGAN) tablet 12.5 mg  12.5 mg Oral Q6H PRN Linda Richards MD   12.5 mg at 05/02/21 9667    Or    ondansetron (ZOFRAN) injection 4 mg  4 mg Intravenous Q6H PRN Linda Richards MD             REVIEW OF SYSTEMS:    CONSTITUTIONAL: denies fever   HEENT: denies blurring of vision or double vision, denies hearing problem  RESPIRATORY:  Shortness of breath  CARDIOVASCULAR:  Denies palpitation  GASTROINTESTINAL:  Denies abdomen pain, diarrhea or constipation.   GENITOURINARY:  Denies burning urination or frequency 4.5 11/02/2011    CALCIUM 8.7 04/29/2021    BILITOT 0.4 04/29/2021    ALKPHOS 76 04/29/2021    AST 19 04/29/2021    ALT 10 04/29/2021         Hepatic Function Panel:    Lab Results   Component Value Date    ALKPHOS 76 04/29/2021    ALT 10 04/29/2021    AST 19 04/29/2021    PROT 6.5 04/29/2021    BILITOT 0.4 04/29/2021    BILIDIR <0.2 12/26/2019    IBILI see below 12/26/2019    LABALBU 3.2 04/29/2021    LABALBU 4.5 11/02/2011       PT/INR:    Lab Results   Component Value Date    PROTIME 19.5 04/29/2021    INR 1.8 04/29/2021       TSH:    Lab Results   Component Value Date    TSH 0.575 01/15/2021       U/A:    Lab Results   Component Value Date    NITRITE negative 07/26/2018    COLORU Yellow 04/29/2021    PHUR 5.0 04/29/2021    WBCUA 5-10 04/29/2021    RBCUA 10-20 04/29/2021    YEAST Present 05/28/2020    BACTERIA MANY 04/29/2021    CLARITYU CLOUDY 04/29/2021    SPECGRAV >=1.030 04/29/2021    LEUKOCYTESUR MODERATE 04/29/2021    UROBILINOGEN 0.2 04/29/2021    BILIRUBINUR SMALL 04/29/2021    BILIRUBINUR negative 07/26/2018    BLOODU LARGE 04/29/2021    GLUCOSEU Negative 04/29/2021    AMORPHOUS FEW 08/14/2020       ABG:  No results found for: PBU3KLL, BEART, B4GZNIPC, PHART, THGBART, LPH6CKG, PO2ART, FQT3FIM    MICROBIOLOGY:    Blood culture -      Susceptibility    Escherichia coli (1)    Antibiotic Interpretation MALKA Status    ampicillin Resistant >=^32 mcg/mL     ceFAZolin Resistant >=^64 mcg/mL     cefTRIAXone Resistant ^32 mcg/mL     Confirmatory Extended Spectrum Beta-Lactamase Positive Pos mcg/mL     ertapenem Sensitive <=^0.12 mcg/mL     gentamicin Sensitive <=^1 mcg/mL     levofloxacin Resistant >=^8 mcg/mL     trimethoprim-sulfamethoxazole Resistant >=^320 mcg/mL     Lab and Collection          COVID 19 negative         Urine Culture - pending       Radiology :    Chest X ray- mild congestion     CT scan of abdomen and pelvis -  Impression:        Right adrenal mass measuring approximately 4.3 cm in diameter,

## 2021-05-02 NOTE — PLAN OF CARE
Problem: Pain:  Goal: Pain level will decrease  Description: Pain level will decrease  Outcome: Met This Shift  Goal: Control of acute pain  Description: Control of acute pain  Outcome: Met This Shift  Goal: Control of chronic pain  Description: Control of chronic pain  Outcome: Met This Shift     Problem: Skin Integrity:  Goal: Will show no infection signs and symptoms  Description: Will show no infection signs and symptoms  5/2/2021 0120 by Radhames Cook RN  Outcome: Met This Shift  5/1/2021 1429 by Sarai Jack RN  Outcome: Met This Shift  Goal: Absence of new skin breakdown  Description: Absence of new skin breakdown  5/2/2021 0120 by Radhames Cook RN  Outcome: Met This Shift  5/1/2021 1429 by Sarai Jack RN  Outcome: Met This Shift     Problem: Falls - Risk of:  Goal: Will remain free from falls  Description: Will remain free from falls  5/2/2021 0120 by Radhames Cook RN  Outcome: Met This Shift  5/1/2021 1429 by Sarai Jack RN  Outcome: Met This Shift  Goal: Absence of physical injury  Description: Absence of physical injury  5/2/2021 0120 by Radhames Cook RN  Outcome: Met This Shift  5/1/2021 1429 by Sarai Jack RN  Outcome: Met This Shift     Problem: Increased nutrient needs (NI-5.1)  Goal: Food and/or Nutrient Delivery  Description: Individualized approach for food/nutrient provision.   5/1/2021 1624 by Marie Valente, MS, RD, LD  Outcome: Met This Shift

## 2021-05-02 NOTE — PROGRESS NOTES
INPATIENT CARDIOLOGY FOLLOW-UP    Name: Orville Amezcua    Age: 77 y.o. Date of Admission: 4/29/2021  3:53 PM    Date of Service: 5/2/2021    Chief Complaint: Follow-up for atrial fibrillation with RVR    Interim History:  Currently with no chest pain, respiratory distress, or palpitations. +somnolent. Atrial fibrillation with RVR on admission EKG and telemetry --> most recent HR 80's. No new overnight cardiac complaints.     Review of Systems:   Cardiac: As per HPI  General: No fever, chills  Pulmonary: As per HPI  HEENT: No visual disturbances, difficult swallowing  GI: No nausea, vomiting  : No dysuria, hematuria  Endocrine: No thyroid disease, +DM  Musculoskeletal: MEHTA x 4, no focal motor deficits  Skin: LE stasis dermatitis, no rashes  Neuro: No headache, seizures  Psych: Currently with no depression, anxiety    Problem List:  Patient Active Problem List   Diagnosis    Uncontrolled diabetes mellitus ([address]    Depression    Hypertension    Hyperlipidemia    Osteoarthritis    PAF [facility]paroxysmal atrial fibrillation) ([facility]) - currently in SR    Pulmonary hypertension    Chronic sinusitis    Chronic pain    Steatohepatitis    Baker's cyst of knee    Diabetic neuropathy (HCC)    Iron deficiency    LVH (left ventricular hypertrophy)    Chronic anal fissure    Positive hepatitis C antibody test    PFO (patent foramen ovale)    YEIMI (acute kidney injury) ([address]    Chronic diastolic heart failure (HCC)    Physical deconditioning    Lymphedema of both lower extremities    Chronic anemia    Dyspnea on exertion    Chronic deep vein thrombosis (DVT) of distal vein of right lower extremity (HCC)    GI bleed    Anxiety and depression    Chronic anticoagulation    COPD exacerbation (HCC)    Acute on chronic respiratory failure with hypoxia (HCC)    Blood loss anemia    Sepsis (HCC)    Cellulitis of left lower extremity    Poorly controlled diabetes mellitus ([address]    Lymphedema    Septicemia ([facility]    Acute diastolic congestive heart failure (HCC)    Acute on chronic anemia    Morbid obesity (HCC)    Acute hypoxemic respiratory failure (HCC)    Diastolic CHF, acute on chronic (HCC)    Cellulitis    Stress fracture of right fibula    Atrial fibrillation, currently in sinus rhythm    Gastroesophageal reflux disease without esophagitis    Ulcers of both lower legs (HCC)    Chronic respiratory failure with hypoxia (HCC)    Abscess and cellulitis of gluteal region    CHF (congestive heart failure), NYHA class I, acute on chronic, combined ([address]    Adrenal mass (HCC)    Hyperglycemia    Atrial fibrillation with RVR (HCC)    UTI (urinary tract infection)       Allergies:   Allergies   Allergen Reactions    Statins Other (See Comments)     Muscle pains       Current Medications:  Current Facility-Administered Medications   Medication Dose Route Frequency Provider Last Rate Last Admin    ezetimibe (ZETIA) tablet 10 mg  10 mg Oral Nightly RAUL Bradford - CNS   10 mg at 05/01/21 2053    cefTRIAXone (ROCEPHIN) 2,000 mg in sterile water 20 mL IV syringe  2,000 mg Intravenous Q24H René Philippe MD   2,000 mg at 05/01/21 2212    metoprolol succinate (TOPROL XL) extended release tablet 100 mg  100 mg Oral Daily Linh Evangelista, PA   100 mg at 05/02/21 0828    dilTIAZem 100 mg in dextrose 5 % 100 mL infusion ([facility])  5-15 mg/hr Intravenous Continuous Kelvin Ortiz MD 12.5 mL/hr at 05/02/21 0824 12.5 mg/hr at 05/02/21 0824    insulin lispro (HUMALOG) injection vial 0-18 Units  0-18 Units Subcutaneous TID WC Kelvin Ortiz MD   18 Units at 05/02/21 6981    insulin lispro (HUMALOG) injection vial 0-9 Units  0-9 Units Subcutaneous Nightly Kevlin Ortiz MD   9 Units at 05/01/21 2055    white petrolatum ointment   Topical BID PRN Kelvin Ortiz MD        albuterol (PROVENTIL) nebulizer solution 2.5 mg  2.5 mg Nebulization Q4H PRN Antonio Epstein MD   2.5 mg at 05/02/21 1227    apixaban (ELIQUIS) tablet 5 mg  5 mg Oral BID Tevin Neely MD   5 mg at 05/02/21 0139    calcium carbonate (TUMS) chewable tablet 500 mg  500 mg Oral TID PRREKHA Neely MD        dilTIAZem PETE [facility] CD) extended release capsule 240 mg  240 mg Oral BID Tevin Neely MD   Stopped at 04/30/21 1915    escitalopram (LEXAPRO) tablet 20 mg  20 mg Oral Daily Tevin Neely MD   20 mg at 05/02/21 5162    gabapentin (NEURONTIN) capsule 300 mg  300 mg Oral TID Tevin Neely MD   300 mg at 05/02/21 0828    insulin glargine (LANTUS) injection vial 40 Units  40 Units Subcutaneous BID Tevin Neely MD   40 Units at 05/02/21 0836    pantoprazole (PROTONIX) tablet 40 mg  40 mg Oral QAM AC Tevin Neely MD   40 mg at 05/02/21 0606    miconazole (MICOTIN) 2 % powder   Topical BID Tevin Neely MD   Given at 05/02/21 0830    montelukast (SINGULAIR) tablet 10 mg  10 mg Oral Nightly Tevin Neely MD   10 mg at 05/01/21 2053    traZODone (DESYREL) tablet 50 mg  50 mg Oral Nightly Tevin Neely MD   50 mg at 05/01/21 2053    glucose (GLUTOSE) 40 % oral gel 15 g  15 g Oral PRN Tevin Neely MD        dextrose 50 % IV solution  12.5 g Intravenous PRN Tevin Neely MD        glucagon (rDNA) injection 1 mg  1 mg Intramuscular PRN Tevin Neely MD        dextrose 5 % solution  100 mL/hr Intravenous PRN Tevin Neely MD        0.9 % sodium chloride infusion   Intravenous Continuous Tevin Neely MD 75 mL/hr at 05/02/21 0552 New Bag at 05/02/21 0552    sodium chloride flush 0.9 % injection 5-40 mL  5-40 mL Intravenous 2 times per day Tevin Neely MD   10 mL at 05/02/21 0829    sodium chloride flush 0.9 % injection 5-40 mL  5-40 mL Intravenous PRN Tevin Neely MD        0.9 % sodium chloride infusion  25 mL Intravenous PRN Tevin Neely MD        acetaminophen (TYLENOL) tablet 650 mg  650 mg Oral Q6H PRN Tevin Neely MD 650 mg at 05/01/21 2056    Or    acetaminophen (TYLENOL) suppository 650 mg  650 mg Rectal Q6H PRN Quincy Berrios MD        magnesium hydroxide (MILK OF MAGNESIA) 400 MG/5ML suspension 30 mL  30 mL Oral Daily PRN Quincy Berrios MD   30 mL at 05/01/21 0551    promethazine (PHENERGAN) tablet 12.5 mg  12.5 mg Oral Q6H PRN Quincy Berrios MD   12.5 mg at 05/02/21 0442    Or    ondansetron (ZOFRAN) injection 4 mg  4 mg Intravenous Q6H PRN Quincy Berrios MD          dilTIAZem (CARDIZEM) 100 mg in dextrose 5% 100 mL ([facility]) 12.5 mg/hr (05/02/21 0824)    dextrose      sodium chloride 75 mL/hr at 05/02/21 0552    sodium chloride         Physical Exam:  /60   Pulse 84   Temp 96.9 °F (36.1 °C) (Temporal)   Resp 18   Ht 5' 3\" (1.6 m)   Wt 272 lb (123.4 kg)   LMP  (LMP Unknown)   SpO2 97%   BMI 48.18 kg/m²   Wt Readings from Last 3 Encounters:   05/01/21 272 lb (123.4 kg)   04/17/21 272 lb 14 oz (123.8 kg)   01/19/21 260 lb 6 oz (118.1 kg)     Appearance: Somnolent, no acute respiratory distress  Skin: LE stasis dermatitis, no rashes  Head: Normocephalic, atraumatic  Eyes: EOMI, no conjunctival erythema  ENMT: No pharyngeal erythema, MMM, no rhinorrhea  Neck: Supple, no carotid bruits  Lungs: Decreased BS B/L, no wheezing  Cardiac: Regular rate and rhythm, +S1S2, no murmurs apparent  Abdomen: Soft, nontender, +bowel sounds  Extremities: Moves all extremities x 4, +lower extremity edema  Neurologic: No focal motor deficits apparent, somnolent    Intake/Output:    Intake/Output Summary (Last 24 hours) at 5/2/2021 0951  Last data filed at 5/2/2021 0611  Gross per 24 hour   Intake 1354.92 ml   Output 850 ml   Net 504.92 ml     No intake/output data recorded.     Laboratory Tests:  Recent Labs     04/29/21  1614 04/30/21  1200     --    K 4.2  --    CL 87*  --    CO2 30*  --    BUN 47*  --    CREATININE 1.8*  --    GLUCOSE 298* 367   CALCIUM 8.7  --      Lab Results   Component Value Date    MG 2.1 01/16/2021     Recent Labs     04/29/21  1614   ALKPHOS 76   ALT 10   AST 19   PROT 6.5   BILITOT 0.4   LABALBU 3.2*     Recent Labs     04/29/21  1614 04/30/21  0652   WBC 9.3 8.4   RBC 4.31 3.79   HGB 10.2* 9.0*   HCT 34.1 29.9*   MCV 79.1* 78.9*   MCH 23.7* 23.7*   MCHC 29.9* 30.1*   RDW 15.7* 15.6*   * 91*   MPV 11.5 11.1     Lab Results   Component Value Date    CKTOTAL 85 01/13/2021    CKMB 2.7 01/13/2021    TROPONINI 0.02 04/30/2021    TROPONINI 0.02 04/29/2021    TROPONINI <0.01 04/09/2021     Lab Results   Component Value Date    INR 1.8 04/29/2021    INR 1.1 01/12/2021    INR 1.1 05/31/2020    PROTIME 19.5 (H) 04/29/2021    PROTIME 12.4 01/12/2021    PROTIME 12.4 05/31/2020     Lab Results   Component Value Date    TSH 0.575 01/15/2021     Lab Results   Component Value Date    LABA1C 10.9 (H) 04/30/2021     No results found for: EAG  Lab Results   Component Value Date    CHOL 355 (H) 01/16/2021    CHOL 287 (H) 05/26/2020    CHOL 318 (H) 11/07/2019     Lab Results   Component Value Date    TRIG 298 (H) 01/16/2021    TRIG 512 (H) 05/26/2020    TRIG 861 (H) 11/07/2019     Lab Results   Component Value Date    HDL 52 01/16/2021    HDL 33 05/26/2020    HDL 28 11/07/2019     Lab Results   Component Value Date    LDLCALC 243 (H) 01/16/2021    LDLCALC - (AA) 05/26/2020    LDLCALC - (AA) 11/07/2019     Lab Results   Component Value Date    LABVLDL 60 01/16/2021    LABVLDL - (AA) 05/26/2020    LABVLDL - (AA) 11/07/2019     No results found for: CHOLHDLRATIO  No results for input(s): PROBNP in the last 72 hours.     Cardiac Tests:  EKG reviewed (EKG date: 4/30/21): atrial fibrillation/flutter, rate 149, NSSTT changes     Telemetry reviewed (date: 5/2/2021): atrial fibrillation, rate 80's     Echocardiogram reviewed: 4/14/21 (Dr. Jazmine Marrero)   Normal left ventricle size and systolic function.   Ejection fraction is visually estimated at 60-65%.   No regional wall motion abnormalities seen.  Elvis Screws concentric left ventricular hypertrophy.   Indeterminate diastolic function.   The left atrium is mildly dilated.   Moderately dilated right ventricle.   Right ventricle global systolic function is normal . TAPSE 32 mm.   Physiologic and/or trace mitral regurgitation is present.   No hemodynamically significant aortic stenosis is present.   Mild tricuspid regurgitation.  RVSP is 69 mmHg. Pulmonary hypertension is moderate .   No evidence for hemodynamically significant pericardial effusion.   Compared to prior echo of 2015, changes noted. Now, there is moderate pulmonary hypertension with moderately dilated RV. ASSESSMENT / PLAN:  1. Atrial fibrillation with RVR  · In the setting of bacteremia, YEIMI, anemia, probable SAM (untreated), etc     2. Known PAF  · SR on 4/2021 EKG  · No history of DCCV/AAD therapy  · Mildly dilated left atrium on TTE 4/2021  · Anticoagulated with Eliquis     3. Chronic HFpEF with dilated RV and evidence of pulmonary hypertension     4. History of PFO      5. History of TIA      6. COPD with chronic respiratory failure     7. Probable SAM: she has declined testing     8. Type 2 diabetes, insulin requiring with neuropathy. HGA1C 10.2      9. Hypertension: controlled     10. Severe hyperlipidemia (LDL of 243 in 1/2021) with past statin intolerance      11. Morbid Obesity     11. YEIMI  · BUN 26, Cr 0.9 on 4/17/21 ---> 47 / 1.8 on presentation     4/29/21     12. Bacteremia --> E coli UTI , sepsis, bacteremia, pyelonephritis --> ID following     13.  Chronic iron deficiency anemia and new onset thrombocytopenia     - Prior cardiac studies reviewed / SR on 4/2021 EKG  - Will switch Toprol XL to BID dosing (100 mg BID)  - Stop cardizem drip and resume po cardizem  - Continue eliquis  - Monitor CBC and BMP -- diuretics currently on hold and she is receiving IV fluids  - If she does not spontaneously convert to SR, will reassess for rhythm control once noncardiac issues stabilize  - Aggressive risk factor modifications  - Work-up for SAM (she has historically declined testing  - Care per ID    Greater than 35 minutes was spent counseling the patient, reviewing the rationale for the above recommendations and reviewing the patient's current medication list, problem list and results of all previously ordered testing.     Coni Deluca MD  [facility] [place] ([facility]) Cardiology

## 2021-05-02 NOTE — PROGRESS NOTES
Subjective: The patient is awake and alert. Feeling much better today   no acute complainnts   sitting in chair   Still afib on Cardizem     Objective:    /60   Pulse 84   Temp 96.9 °F (36.1 °C) (Temporal)   Resp 18   Ht 5' 3\" (1.6 m)   Wt 272 lb (123.4 kg)   LMP  (LMP Unknown)   SpO2 98%   BMI 48.18 kg/m²     In: 2398.5 [P.O.:360; I.V.:2038.5]  Out: 1000   In: 2398.5   Out: 1000 [Urine:1000]    General appearance: NAD, conversant  HEENT: AT/NC, MMM  Neck: FROM, supple  Lungs: Clear to auscultation  CV: irre rate controlled  , no MRGs  Vasc: Radial pulses 2+  Abdomen: Soft, non-tender; no masses or HSM  Extremities: bl legs with lymphedema and icthyosis   Skin: no rash, lesions or ulcers  Psych: Alert and oriented to person, place and time  Neuro: Alert and interactive     Recent Labs     04/29/21  1614 04/30/21  0652   WBC 9.3 8.4   HGB 10.2* 9.0*   HCT 34.1 29.9*   * 91*       Recent Labs     04/29/21  1614      K 4.2   CL 87*   CO2 30*   BUN 47*   CREATININE 1.8*   CALCIUM 8.7       Assessment:    Active Problems:    Anxiety and depression    Chronic anticoagulation    Hypertension    Hyperlipidemia    PAF (paroxysmal atrial fibrillation) ([facility]) - currently in SR    Pulmonary hypertension    Diabetic neuropathy ([facility])    YEIMI (acute kidney injury) ([facility])    Chronic diastolic heart failure ([facility])    Lymphedema of both lower extremities    Chronic anemia    Gastroesophageal reflux disease without esophagitis    UTI (urinary tract infection)  Resolved Problems:    * No resolved hospital problems. *      Plan:    Admit to telemetry for evaluation of acute hypoxemic respiratory failure associated with morbid obesity, altered mental status with positive blood cultures with gram-negative rods 3 out of 4 bottles- pyelonephritis    Aggressive IV fluid hydration  IV cefepime pending final culture results and sensitivities  Cefepime changed to Rocephin by ID - E.  Coli   Continue BiPAP as at home  Continue nasal cannula as at home     Atrial fibrillation with RVR  Rate controlled , remains afib   Likely secondary to bacteremia/stress state  Resume home medications incl doac   Cardizem drip, po metoprolol  Possible dccv tomorrow   Cardiology following     Am labs     DVT Prophylaxis   PT/OT  Discharge planning           Maris Ha MD  3:24 PM  5/2/2021

## 2021-05-03 PROBLEM — Z16.12 INFECTION DUE TO EXTENDED-SPECTRUM BETA-LACTAMASE-PRODUCING ESCHERICHIA COLI: Status: ACTIVE | Noted: 2021-05-03

## 2021-05-03 PROBLEM — A49.8 INFECTION DUE TO EXTENDED-SPECTRUM BETA-LACTAMASE-PRODUCING ESCHERICHIA COLI: Status: ACTIVE | Noted: 2021-05-03

## 2021-05-03 LAB
BLOOD CULTURE, ROUTINE: ABNORMAL
CULTURE, BLOOD 2: ABNORMAL
METER GLUCOSE: 275 MG/DL (ref 74–99)
METER GLUCOSE: 294 MG/DL (ref 74–99)
METER GLUCOSE: 316 MG/DL (ref 74–99)
METER GLUCOSE: 333 MG/DL (ref 74–99)
ORGANISM: ABNORMAL
ORGANISM: ABNORMAL
SARS-COV-2, NAAT: NOT DETECTED

## 2021-05-03 PROCEDURE — 6370000000 HC RX 637 (ALT 250 FOR IP): Performed by: INTERNAL MEDICINE

## 2021-05-03 PROCEDURE — 2580000003 HC RX 258: Performed by: INTERNAL MEDICINE

## 2021-05-03 PROCEDURE — 6360000002 HC RX W HCPCS: Performed by: INTERNAL MEDICINE

## 2021-05-03 PROCEDURE — 97530 THERAPEUTIC ACTIVITIES: CPT | Performed by: PHYSICAL THERAPIST

## 2021-05-03 PROCEDURE — 97162 PT EVAL MOD COMPLEX 30 MIN: CPT | Performed by: PHYSICAL THERAPIST

## 2021-05-03 PROCEDURE — 87635 SARS-COV-2 COVID-19 AMP PRB: CPT

## 2021-05-03 PROCEDURE — 82962 GLUCOSE BLOOD TEST: CPT

## 2021-05-03 PROCEDURE — 2700000000 HC OXYGEN THERAPY PER DAY

## 2021-05-03 PROCEDURE — 6370000000 HC RX 637 (ALT 250 FOR IP): Performed by: CLINICAL NURSE SPECIALIST

## 2021-05-03 PROCEDURE — 94640 AIRWAY INHALATION TREATMENT: CPT

## 2021-05-03 PROCEDURE — 97530 THERAPEUTIC ACTIVITIES: CPT

## 2021-05-03 PROCEDURE — 99232 SBSQ HOSP IP/OBS MODERATE 35: CPT | Performed by: INTERNAL MEDICINE

## 2021-05-03 PROCEDURE — 2060000000 HC ICU INTERMEDIATE R&B

## 2021-05-03 RX ORDER — SODIUM CHLORIDE 0.9 % (FLUSH) 0.9 %
5-40 SYRINGE (ML) INJECTION PRN
Status: DISCONTINUED | OUTPATIENT
Start: 2021-05-03 | End: 2021-05-06 | Stop reason: HOSPADM

## 2021-05-03 RX ORDER — LIDOCAINE HYDROCHLORIDE 10 MG/ML
5 INJECTION, SOLUTION EPIDURAL; INFILTRATION; INTRACAUDAL; PERINEURAL ONCE
Status: DISCONTINUED | OUTPATIENT
Start: 2021-05-03 | End: 2021-05-06 | Stop reason: HOSPADM

## 2021-05-03 RX ORDER — HEPARIN SODIUM (PORCINE) LOCK FLUSH IV SOLN 100 UNIT/ML 100 UNIT/ML
1 SOLUTION INTRAVENOUS EVERY 12 HOURS SCHEDULED
Status: DISCONTINUED | OUTPATIENT
Start: 2021-05-03 | End: 2021-05-06 | Stop reason: HOSPADM

## 2021-05-03 RX ORDER — HEPARIN SODIUM (PORCINE) LOCK FLUSH IV SOLN 100 UNIT/ML 100 UNIT/ML
1 SOLUTION INTRAVENOUS PRN
Status: DISCONTINUED | OUTPATIENT
Start: 2021-05-03 | End: 2021-05-06 | Stop reason: HOSPADM

## 2021-05-03 RX ORDER — SODIUM CHLORIDE 0.9 % (FLUSH) 0.9 %
5-40 SYRINGE (ML) INJECTION EVERY 12 HOURS SCHEDULED
Status: DISCONTINUED | OUTPATIENT
Start: 2021-05-03 | End: 2021-05-06 | Stop reason: HOSPADM

## 2021-05-03 RX ORDER — LORAZEPAM 2 MG/ML
0.5 INJECTION INTRAMUSCULAR EVERY 6 HOURS PRN
Status: DISCONTINUED | OUTPATIENT
Start: 2021-05-03 | End: 2021-05-06 | Stop reason: HOSPADM

## 2021-05-03 RX ORDER — SODIUM CHLORIDE 9 MG/ML
25 INJECTION, SOLUTION INTRAVENOUS PRN
Status: DISCONTINUED | OUTPATIENT
Start: 2021-05-03 | End: 2021-05-06 | Stop reason: HOSPADM

## 2021-05-03 RX ADMIN — SODIUM CHLORIDE, PRESERVATIVE FREE 10 ML: 5 INJECTION INTRAVENOUS at 09:30

## 2021-05-03 RX ADMIN — LORAZEPAM 0.5 MG: 2 INJECTION INTRAMUSCULAR; INTRAVENOUS at 21:50

## 2021-05-03 RX ADMIN — APIXABAN 5 MG: 5 TABLET, FILM COATED ORAL at 20:35

## 2021-05-03 RX ADMIN — SODIUM CHLORIDE, PRESERVATIVE FREE 10 ML: 5 INJECTION INTRAVENOUS at 20:35

## 2021-05-03 RX ADMIN — MONTELUKAST SODIUM 10 MG: 10 TABLET, FILM COATED ORAL at 20:35

## 2021-05-03 RX ADMIN — ALBUTEROL SULFATE 2.5 MG: 2.5 SOLUTION RESPIRATORY (INHALATION) at 08:19

## 2021-05-03 RX ADMIN — INSULIN GLARGINE 40 UNITS: 100 INJECTION, SOLUTION SUBCUTANEOUS at 09:30

## 2021-05-03 RX ADMIN — GABAPENTIN 300 MG: 300 CAPSULE ORAL at 15:19

## 2021-05-03 RX ADMIN — INSULIN LISPRO 7 UNITS: 100 INJECTION, SOLUTION INTRAVENOUS; SUBCUTANEOUS at 21:47

## 2021-05-03 RX ADMIN — EZETIMIBE 10 MG: 10 TABLET ORAL at 20:35

## 2021-05-03 RX ADMIN — ACETAMINOPHEN 650 MG: 325 TABLET ORAL at 20:35

## 2021-05-03 RX ADMIN — ALBUTEROL SULFATE 2.5 MG: 2.5 SOLUTION RESPIRATORY (INHALATION) at 12:26

## 2021-05-03 RX ADMIN — INSULIN LISPRO 8 UNITS: 100 INJECTION, SOLUTION INTRAVENOUS; SUBCUTANEOUS at 06:03

## 2021-05-03 RX ADMIN — GABAPENTIN 300 MG: 300 CAPSULE ORAL at 20:35

## 2021-05-03 RX ADMIN — MICONAZOLE NITRATE: 20.6 POWDER TOPICAL at 20:36

## 2021-05-03 RX ADMIN — GABAPENTIN 300 MG: 300 CAPSULE ORAL at 09:29

## 2021-05-03 RX ADMIN — ERTAPENEM SODIUM 1000 MG: 1 INJECTION, POWDER, LYOPHILIZED, FOR SOLUTION INTRAMUSCULAR; INTRAVENOUS at 13:15

## 2021-05-03 RX ADMIN — INSULIN LISPRO 8 UNITS: 100 INJECTION, SOLUTION INTRAVENOUS; SUBCUTANEOUS at 11:34

## 2021-05-03 RX ADMIN — INSULIN LISPRO 9 UNITS: 100 INJECTION, SOLUTION INTRAVENOUS; SUBCUTANEOUS at 16:23

## 2021-05-03 RX ADMIN — METOPROLOL SUCCINATE 100 MG: 100 TABLET, EXTENDED RELEASE ORAL at 20:35

## 2021-05-03 RX ADMIN — ALBUTEROL SULFATE 2.5 MG: 2.5 SOLUTION RESPIRATORY (INHALATION) at 00:51

## 2021-05-03 RX ADMIN — TRAZODONE HYDROCHLORIDE 50 MG: 50 TABLET ORAL at 20:35

## 2021-05-03 RX ADMIN — METOPROLOL SUCCINATE 100 MG: 100 TABLET, EXTENDED RELEASE ORAL at 09:29

## 2021-05-03 RX ADMIN — ALBUTEROL SULFATE 2.5 MG: 2.5 SOLUTION RESPIRATORY (INHALATION) at 15:37

## 2021-05-03 RX ADMIN — INSULIN GLARGINE 40 UNITS: 100 INJECTION, SOLUTION SUBCUTANEOUS at 21:47

## 2021-05-03 RX ADMIN — MICONAZOLE NITRATE: 20.6 POWDER TOPICAL at 13:15

## 2021-05-03 RX ADMIN — APIXABAN 5 MG: 5 TABLET, FILM COATED ORAL at 09:29

## 2021-05-03 RX ADMIN — INSULIN LISPRO 8 UNITS: 100 INJECTION, SOLUTION INTRAVENOUS; SUBCUTANEOUS at 16:22

## 2021-05-03 RX ADMIN — ACETAMINOPHEN 650 MG: 325 TABLET ORAL at 04:23

## 2021-05-03 RX ADMIN — ESCITALOPRAM 20 MG: 10 TABLET, FILM COATED ORAL at 09:30

## 2021-05-03 RX ADMIN — DILTIAZEM HYDROCHLORIDE 240 MG: 240 CAPSULE, EXTENDED RELEASE ORAL at 20:35

## 2021-05-03 RX ADMIN — INSULIN LISPRO 12 UNITS: 100 INJECTION, SOLUTION INTRAVENOUS; SUBCUTANEOUS at 06:01

## 2021-05-03 RX ADMIN — DILTIAZEM HYDROCHLORIDE 240 MG: 240 CAPSULE, EXTENDED RELEASE ORAL at 09:30

## 2021-05-03 RX ADMIN — INSULIN LISPRO 9 UNITS: 100 INJECTION, SOLUTION INTRAVENOUS; SUBCUTANEOUS at 11:34

## 2021-05-03 RX ADMIN — PANTOPRAZOLE SODIUM 40 MG: 40 TABLET, DELAYED RELEASE ORAL at 06:00

## 2021-05-03 ASSESSMENT — PAIN DESCRIPTION - FREQUENCY: FREQUENCY: CONTINUOUS

## 2021-05-03 ASSESSMENT — PAIN DESCRIPTION - PROGRESSION: CLINICAL_PROGRESSION: NOT CHANGED

## 2021-05-03 ASSESSMENT — PAIN SCALES - GENERAL
PAINLEVEL_OUTOF10: 0
PAINLEVEL_OUTOF10: 6
PAINLEVEL_OUTOF10: 6

## 2021-05-03 ASSESSMENT — PAIN DESCRIPTION - PAIN TYPE: TYPE: CHRONIC PAIN

## 2021-05-03 NOTE — PLAN OF CARE
Problem: Pain:  Goal: Pain level will decrease  Description: Pain level will decrease  5/2/2021 2353 by Tano Ferrari RN  Outcome: Met This Shift  5/2/2021 2331 by Radhames Cook RN  Outcome: Met This Shift  5/2/2021 1351 by Kendall Sanchez RN  Outcome: Met This Shift  Goal: Control of acute pain  Description: Control of acute pain  5/2/2021 2353 by Tano Ferrari RN  Outcome: Met This Shift  5/2/2021 2331 by Radhames Cook RN  Outcome: Met This Shift  5/2/2021 1351 by Kendall Sanchez RN  Outcome: Met This Shift  Goal: Control of chronic pain  Description: Control of chronic pain  5/2/2021 2353 by Tano Ferrari RN  Outcome: Met This Shift  5/2/2021 2331 by Radhames Cook RN  Outcome: Met This Shift  5/2/2021 1351 by Kendall Sanchez RN  Outcome: Met This Shift

## 2021-05-03 NOTE — PLAN OF CARE
Problem: Pain:  Goal: Pain level will decrease  Description: Pain level will decrease  5/2/2021 2331 by Radhames Cook RN  Outcome: Met This Shift  5/2/2021 1351 by Kendall Sanchez RN  Outcome: Met This Shift  Goal: Control of acute pain  Description: Control of acute pain  5/2/2021 2331 by Radhames Cook RN  Outcome: Met This Shift  5/2/2021 1351 by Kendall Sanchez RN  Outcome: Met This Shift  Goal: Control of chronic pain  Description: Control of chronic pain  5/2/2021 2331 by Radhames Cook RN  Outcome: Met This Shift  5/2/2021 1351 by Kendall Sanchez RN  Outcome: Met This Shift     Problem: Skin Integrity:  Goal: Will show no infection signs and symptoms  Description: Will show no infection signs and symptoms  5/2/2021 2331 by Radhames Cook RN  Outcome: Met This Shift  5/2/2021 1351 by Kendall Sanchez RN  Outcome: Met This Shift  Goal: Absence of new skin breakdown  Description: Absence of new skin breakdown  Outcome: Met This Shift     Problem: Falls - Risk of:  Goal: Will remain free from falls  Description: Will remain free from falls  Outcome: Met This Shift  Goal: Absence of physical injury  Description: Absence of physical injury  Outcome: Met This Shift

## 2021-05-03 NOTE — PROGRESS NOTES
Occupational Therapy  OT BEDSIDE TREATMENT NOTE      Date:5/3/2021  Patient Name: Vinod Keys  MRN: [id number]  : 1955  Room: [address] [place], [place] [address]A     Referring Provider: Silvia Peters MD     Evaluating OT: Nina Alarcon OTR/L #[id number]     AM-PAC Daily Activity Raw Score:      Recommended Adaptive Equipment: TBD      Diagnosis: UTI (urinary tract infection) [N39.0]     Patient presented to ED for AMS     Surgery/Procedure:  none  Pertinent Medical History:  Depression/Anxiety, Arthritis, Asthma, CHF, COPD, DM, Hx of Blood Clots, HTN, Lymphedema, L TKR, R LE Fx/surgical repair     Precautions:  Falls, BLE swelling, O2, bed alarm     Home Living/PLOF: [facility] admitted from [facility]). Assistance with ADLs , Assistance with IADLs; ambulated w/ ww for short distances. Was active with therapy services.      Pain Level: Pt reports Mod RLE pain increased with movement  Cognition: A&O: 3/4; Follows 1-2 step directions              Memory:  fair              Sequencing:  fair               Problem solving: fair -              Judgement/safety: fair -                Functional Assessment:    Initial Eval Status  Date: 21 Treatment Status  Date: 5/3/21 STGs/LTGs  Treatment frequency: 1-4x/wk   Feeding SBA/set-up  Set up Modified [place]     Grooming Moderate Assist (bed level)  Min A  To wash face seated in bedside chair Stand by Assist (seated in bedside chair)   UB Dressing Moderate Assist to don/doff gown Mod A  To don gown around back while seated  Min. A    LB Dressing Dependent  Max A  To don underwear seated in bedside chair and standing to pull over hips  Maximal Assist    Bathing Max A Max A  Per last tx  Moderate Assist    Toileting Dependent  Dependent  Per last tx  Maximal Assist    Bed Mobility  Rolling:  Max A  Supine to sit: Maximal Assist x2  Sit to supine: Maximal Assist x2     Partial d/t pt c/o dizziness/SOB Max A x 2  Per last tx  Supine to sit: Minimal Assist   Sit to supine: Minimal Assist    Functional Transfers NT  Mod A- sit<->stand  Cuing for hand placement and body mechanics Moderate Assist    Functional Mobility NT Mod A  Less than home distance using w/w in preparation to bathroom ADL's.  Moderate Assist w/ ww   Balance Sitting:     Static:  NT    Dynamic:NT  Standing: NT    Static: NT    Dynamic: NT Sitting:     Static:  SBA    Dynamic: Min A  Standing:     Static: Mod A    Dynamic: Max A     Activity Tolerance Poor+ (limited d/t pain and SOB--pt attempted to sit EOB,unable d/t pain and SOB, assessed O2 in bed s/p transfer from EOB. O2=81% on 3L, educated pt on PLB and increased O2 to 5L. O2 recovered to 92% in 1-2 minutes. RN aware. Poor  Fair+   Visual/  Perceptual Glasses: readers          Safety    fair-         Comments: Upon arrival pt seated in bedside chair. Pt educated on techniques to increase independence and safety during ADL's, and functional transfers. At end of session pt left seated in bedside chair, call light within reach. · Pt has made fair progress towards set goals.      · Continue with current plan of care    Treatment Time In: 9:30            Treatment Time Out: 9:45             Treatment Charges: Mins Units   Ther Ex  [id number]     Manual Therapy [id number]     Thera Activities [id number] 15 1   ADL/Home Mgt [id number]     Neuro Re-ed [id number]     Group Therapy      Orthotic manage/training  [id number]     Non-Billable Time     Total Timed Treatment 15 [address], [place] [address]

## 2021-05-03 NOTE — PROGRESS NOTES
Department of Internal Medicine  Infectious Diseases   Progress Note      C/C : ESBL   E coli sepsis, bacteremia     Denies fever or chills  Reports shortness of breath   Leg swelling   Afebrile     Current Facility-Administered Medications   Medication Dose Route Frequency Provider Last Rate Last Admin    metoprolol succinate (TOPROL XL) extended release tablet 100 mg  100 mg Oral BID Manuel Birmingham MD   100 mg at 05/03/21 0929    ertapenem (INVanz) 1000 mg IVPB minibag  1,000 mg Intravenous Q24H Marquis Philippe MD   Stopped at 05/03/21 1416    insulin lispro (HUMALOG) injection vial 8 Units  8 Units Subcutaneous TID MACRINA Hdez MD   8 Units at 05/03/21 1134    ezetimibe (ZETIA) tablet 10 mg  10 mg Oral Nightly RAUL Glover - CNS   10 mg at 05/02/21 2008    insulin lispro (HUMALOG) injection vial 0-18 Units  0-18 Units Subcutaneous TID MACRINA Hdez MD   9 Units at 05/03/21 1134    insulin lispro (HUMALOG) injection vial 0-9 Units  0-9 Units Subcutaneous Nightly Kirk Hdez MD   7 Units at 05/02/21 2007    white petrolatum ointment   Topical BID PRN Kirk Hdez MD        albuterol (PROVENTIL) nebulizer solution 2.5 mg  2.5 mg Nebulization Q4H PRN Stephie Kent MD   2.5 mg at 05/03/21 1226    apixaban (ELIQUIS) tablet 5 mg  5 mg Oral BID Stephie Kent MD   5 mg at 05/03/21 4025    calcium carbonate (TUMS) chewable tablet 500 mg  500 mg Oral TID PRN Stephie Kent MD        dilTIAZem (CARDIZEM CD) extended release capsule 240 mg  240 mg Oral BID Stephie Kent MD   240 mg at 05/03/21 0930    escitalopram (LEXAPRO) tablet 20 mg  20 mg Oral Daily Stephie Kent MD   20 mg at 05/03/21 0930    gabapentin (NEURONTIN) capsule 300 mg  300 mg Oral TID Stephie Kent MD   300 mg at 05/03/21 0929    insulin glargine (LANTUS) injection vial 40 Units  40 Units Subcutaneous BID Stephie Kent MD   40 Units at 05/03/21 0992    pantoprazole (PROTONIX) tablet 40 mg  40 mg Oral QAM AC Sheila Hutton MD   40 mg at 05/03/21 0600    miconazole (MICOTIN) 2 % powder   Topical BID Sheila Hutton MD   Given at 05/03/21 1315    montelukast (SINGULAIR) tablet 10 mg  10 mg Oral Nightly Sheila Hutton MD   10 mg at 05/02/21 2009    traZODone (DESYREL) tablet 50 mg  50 mg Oral Nightly Sheila Hutton MD   50 mg at 05/02/21 2009    glucose (GLUTOSE) 40 % oral gel 15 g  15 g Oral PRN Sheila Hutton MD        dextrose 50 % IV solution  12.5 g Intravenous PRREKHA Hutton MD        glucagon (rDNA) injection 1 mg  1 mg Intramuscular PRREKHA Hutton MD        dextrose 5 % solution  100 mL/hr Intravenous PRN Sheila Hutton MD        sodium chloride flush 0.9 % injection 5-40 mL  5-40 mL Intravenous 2 times per day Sheila Hutton MD   10 mL at 05/03/21 0930    sodium chloride flush 0.9 % injection 5-40 mL  5-40 mL Intravenous PRN Sheila Hutton MD        0.9 % sodium chloride infusion  25 mL Intravenous PRN Sheila Hutton MD        acetaminophen (TYLENOL) tablet 650 mg  650 mg Oral Q6H PRREKHA Hutton MD   650 mg at 05/03/21 0423    Or    acetaminophen (TYLENOL) suppository 650 mg  650 mg Rectal Q6H PRN Sheila Hutton MD        magnesium hydroxide (MILK OF MAGNESIA) 400 MG/5ML suspension 30 mL  30 mL Oral Daily PRN Sheila Hutton MD   30 mL at 05/01/21 0551    promethazine (PHENERGAN) tablet 12.5 mg  12.5 mg Oral Q6H PRREKHA Hutton MD   12.5 mg at 05/02/21 2207    Or    ondansetron (ZOFRAN) injection 4 mg  4 mg Intravenous Q6H PRREKHA uHtton MD             REVIEW OF SYSTEMS:    CONSTITUTIONAL: denies fever   HEENT: denies blurring of vision or double vision, denies hearing problem  RESPIRATORY:  Shortness of breath  CARDIOVASCULAR:  Denies palpitation  GASTROINTESTINAL:  Denies abdomen pain, diarrhea or constipation.   GENITOURINARY:  Denies burning urination or frequency of urination  INTEGUMENT: denies wound , rash  HEMATOLOGIC/LYMPHATIC:  Denies lymph node swelling, gum bleeding or easy bruising. MUSCULOSKELETAL:Chronic leg swelling   NEUROLOGICAL:  Denies light headed, dizziness, loss of consciousness      PHYSICAL EXAM:      Vitals:     /88   Pulse 84   Temp 97.6 °F (36.4 °C) (Temporal)   Resp 18   Ht 5' 3\" (1.6 m)   Wt 272 lb (123.4 kg)   LMP  (LMP Unknown)   SpO2 95%   BMI 48.18 kg/m²     General Appearance:    Awake, alert , no acute distress  Morbidly obese . Head:    Normocephalic, atraumatic   Eyes:    No pallor, no icterus,   Ears:    No obvious deformity or drainage.    Nose:   No nasal drainage   Throat:   Mucosa moist, no oral thrush   Neck:   Supple, no lymphadenopathy   Back:     Right CVA tenderness   Lungs:     Crackles at the bases    Heart:    Irregular    Abdomen:     Soft, non-tender, bowel sounds present    Extremities:   Bilateral chronic lymphedema, non tender    Pulses:   Dorsalis pedis not  palpable    Skin:   no rashes or lesions     CBC with Differential:      Lab Results   Component Value Date    WBC 8.4 04/30/2021    RBC 3.79 04/30/2021    HGB 9.0 04/30/2021    HCT 29.9 04/30/2021    PLT 91 04/30/2021    MCV 78.9 04/30/2021    MCH 23.7 04/30/2021    MCHC 30.1 04/30/2021    RDW 15.6 04/30/2021    NRBC 0.0 12/04/2018    SEGSPCT 85 01/29/2014    METASPCT 3.5 12/04/2018    LYMPHOPCT 7.4 04/29/2021    MONOPCT 5.5 04/29/2021    MYELOPCT 2.6 12/04/2018    BASOPCT 0.2 04/29/2021    MONOSABS 0.51 04/29/2021    LYMPHSABS 0.69 04/29/2021    EOSABS 0.02 04/29/2021    BASOSABS 0.02 04/29/2021       CMP     Lab Results   Component Value Date     04/29/2021    K 4.2 04/29/2021    K 3.9 04/13/2021    CL 87 04/29/2021    CO2 30 04/29/2021    BUN 47 04/29/2021    CREATININE 1.8 04/29/2021    GFRAA 34 04/29/2021    LABGLOM 28 04/29/2021    GLUCOSE 367 04/30/2021    GLUCOSE 181 12/16/2011    PROT 6.5 04/29/2021    LABALBU 3.2 04/29/2021    LABALBU 4.5 11/02/2011    CALCIUM 8.7 04/29/2021    BILITOT 0.4 04/29/2021    ALKPHOS 76 04/29/2021    AST 19 04/29/2021    ALT 10 04/29/2021         Hepatic Function Panel:    Lab Results   Component Value Date    ALKPHOS 76 04/29/2021    ALT 10 04/29/2021    AST 19 04/29/2021    PROT 6.5 04/29/2021    BILITOT 0.4 04/29/2021    BILIDIR <0.2 12/26/2019    IBILI see below 12/26/2019    LABALBU 3.2 04/29/2021    LABALBU 4.5 11/02/2011       PT/INR:    Lab Results   Component Value Date    PROTIME 19.5 04/29/2021    INR 1.8 04/29/2021       TSH:    Lab Results   Component Value Date    TSH 0.575 01/15/2021       U/A:    Lab Results   Component Value Date    NITRITE negative 07/26/2018    COLORU Yellow 04/29/2021    PHUR 5.0 04/29/2021    WBCUA 5-10 04/29/2021    RBCUA 10-20 04/29/2021    YEAST Present 05/28/2020    BACTERIA MANY 04/29/2021    CLARITYU CLOUDY 04/29/2021    SPECGRAV >=1.030 04/29/2021    LEUKOCYTESUR MODERATE 04/29/2021    UROBILINOGEN 0.2 04/29/2021    BILIRUBINUR SMALL 04/29/2021    BILIRUBINUR negative 07/26/2018    BLOODU LARGE 04/29/2021    GLUCOSEU Negative 04/29/2021    AMORPHOUS FEW 08/14/2020       ABG:  No results found for: TYQ3LQN, BEART, U7UJTBZX, PHART, THGBART, FHV3MME, PO2ART, DJP5HNO    MICROBIOLOGY:    Blood culture -      Susceptibility    Escherichia coli (1)    Antibiotic Interpretation MALKA Status    ampicillin Resistant >=^32 mcg/mL     ceFAZolin Resistant >=^64 mcg/mL     cefTRIAXone Resistant ^32 mcg/mL     Confirmatory Extended Spectrum Beta-Lactamase Positive Pos mcg/mL     ertapenem Sensitive <=^0.12 mcg/mL     gentamicin Sensitive <=^1 mcg/mL     levofloxacin Resistant >=^8 mcg/mL     trimethoprim-sulfamethoxazole Resistant >=^320 mcg/mL     Lab and Collection          COVID 19 negative         Urine Culture - pending       Radiology :    Chest X ray- mild congestion     CT scan of abdomen and pelvis -  Impression:        Right adrenal mass measuring approximately 4.3 cm in diameter, increased in   the interval since the prior examination.  Indeterminate lesion, malignancy   must be suspected and cannot be excluded.  Surgical consultation recommended. Consider biochemical evaluation for functional status and pheochromocytoma   prior to resection. Splenomegaly. Atelectatic changes in the right lung base with no airspace consolidation or   pleural effusions. Perinephric stranding bilaterally, left greater than right which may be   chronic.  Renal inflammatory process cannot be excluded.  No urolithiasis or   obstructive uropathy. IMPRESSION:     1. ESBL E coli UTI , sepsis, bacteremia, pyelonephritis   2. Lactic acidosis   3. Morbid obesity, chronic lymphedema   4. Resp failure, CHF,COPD       RECOMMENDATIONS:      1. Midline insertion   2. IV invanz 1 gram IV q 24 hrs  3.  Contact isolation, diuretics

## 2021-05-03 NOTE — PROGRESS NOTES
Physical Therapy    Physical Therapy Initial Assessment     Name: Tanja Canchola  : 1955  MRN: [id number]    Referring Provider:  Allie Arrieta MD    Date of Service: 5/3/2021    Evaluating PT:  Jamirleonor Parisi, PT, DPT [id number]      Room #:  5238/5192-S  Diagnosis:  UTI  PMHx/PSHx:  Afib, asthma, inappropriate sinus tachycardia, HTN, HLD, disc disorder, anal fissure, LVH, respiratory acidosis, GERD, CHF, COPD, anxiety and depression, type 2 diabetes  Procedure/Surgery:  None this admission  Precautions:  Falls, O2, B LE edema  Equipment Needs:  TBD    SUBJECTIVE:  Pt admitted from [facility]) where she was working with therapy. Pt reports she had been able to amb short distances (20-30 ft) with FWW and therapist assistance. She reports requiring assistance with ADLs at recent baseline. Pt lives with her  and two grandsons in a 2 story house with 3 stairs vs ramp to enter. Bed is on 2nd floor and bath is on 2nd floor. However, pt reports having a permanent first floor set up. She states she sleeps in her lift chair or on the couch. No first floor bathroom available but pt does have a BSC. Pt ambulated with rollator prior to initial admission. OBJECTIVE:   Initial Evaluation  Date: 5/3/21 Treatment Short Term/ Long Term   Goals   AM-PAC 6 Clicks 89/64     Was pt agreeable to Eval/treatment? yes     Does pt have pain? 5/10 R LE with WB  No pain at rest     Bed Mobility  Rolling: NT  Supine to sit: NT  Sit to supine: NT  Scooting: NT  Rolling: Mod Independent   Supine to sit: Mod Independent   Sit to supine: Mod Independent   Scooting: Mod Independent    Transfers Sit to stand: Mod A  Stand to sit: Mod A  Stand pivot: Mod A with FWW  Sit to stand: Mod Independent   Stand to sit: Mod Independent   Stand pivot:  Mod Independent with AAD   Ambulation    20 feet with FWW with Mod A  >50 feet with AAD with Mod Independent    Stair negotiation: ascended and descended  NT  TBD   ROM BUE:  WNL  BLE: WNL     Strength BUE:  WNL  BLE:  Moderate functional weakness, unable to tolerate MMT due to LE pain     Balance Sitting EOB:  Supervision  Dynamic Standing: Mod A with FWW  Sitting EOB:  Independent  Dynamic Standing: Mod Independent with AAD     Pt is A & O x 3  Sensation:  Pt denies numbness and tingling to extremities  Edema:  Substantial B LE edema noted    Vitals:  SPO2 maintained >95% on 4L/min HFNC    Patient education  Pt educated on role of therapy, safety with mobility, transfer technique, improved gait mechanics and use of walker    Patient response to education:   Pt verbalized understanding Pt demonstrated skill Pt requires further education in this area   yes yes yes     ASSESSMENT:    Comments:  Pt seated on BSC upon initial arrival, agreeable to PT eval and assistance with transfer. Pt requires max cues for safe hand placement and assist for lift/lower to complete sit<>stand from [facility] and chair with arm rests. Pt maintains forward flexed posture with all standing. She required to be seated quickly with attempt for pivot transfer due to increased R LE pain with standing. Pt requesting to finish breakfast prior to additional mobility therefore split session required. Pt agreeable to amb later in AM with manual assist for balance and walker management throughout demonstrating forward flexed posture and shortened step length bilaterally. Distance limited by R LE pain. Pt returned to be seated in chair upon completion of both sessions with all needs in reach. She will benefit from continued skilled PT services to improve independence with all bed mobility, transfer technique, and progression of gait training/endurance as tolerated.     Treatment:  Patient practiced and was instructed in the following treatment:     Transfer training: cues for hand placement and use of walker, manual assist for lift/lower and standing balance with sit<>stand and stand pivot transfers   Gait training: cues for walker management and upright posture, manual assist for balance throughout    Pt's/ family goals   1. To return home with family     Patient and or family understand(s) diagnosis, prognosis, and plan of care. yes    PLAN OF CARE:    Current Treatment Recommendations   [x] Strengthening     [] ROM   [x] Balance Training   [x] Endurance Training   [x] Transfer Training   [x] Gait Training   [x] Stair Training   [] Positioning   [x] Safety and Education Training   [x] Patient/Caregiver Education   [] HEP  [] Other       PT care will be provided in accordance with the objectives noted above. Whenever appropriate, clear delegation orders will be provided for nursing staff. Exercises and functional mobility practice will be used as well as appropriate assistive devices or modalities to obtain goals. Patient and family education will also be administered as needed. Frequency of treatments: 2-5x/week x 7-10 days. Time in  0604  0925  Time out  0903 0845    Total Treatment Time  20 minutes     Evaluation Time includes thorough review of current medical information, gathering information on past medical history/social history and prior level of function, completion of standardized testing/informal observation of tasks, assessment of data and education on plan of care and goals.     CPT codes:  [] Low Complexity PT evaluation 96849  [x] Moderate Complexity PT evaluation 15409  [] High Complexity PT evaluation 79392  [] PT Re-evaluation 65669  [] Gait training 07677 0 minutes  [] Manual therapy 74734 0 minutes  [] Therapeutic activities 55180 20 minutes  [] Therapeutic exercises 71840 0 minutes  [] Neuromuscular reeducation 18310 0 minutes     [facility] Center[facility] Vasquez, PT, DPT  [id number]

## 2021-05-03 NOTE — PROGRESS NOTES
Subjective: The patient is awake and alert. Feeling much better today   no acute complainnts   sitting in chair feels well       Objective:    /88   Pulse 84   Temp 97.6 °F (36.4 °C) (Temporal)   Resp 18   Ht 5' 3\" (1.6 m)   Wt 272 lb (123.4 kg)   LMP  (LMP Unknown)   SpO2 97%   BMI 48.18 kg/m²     In: 480 [P.O.:480]  Out: 950   In: 480   Out: 950 [Urine:950]    General appearance: NAD, conversant  HEENT: AT/NC, MMM  Neck: FROM, supple  Lungs: Clear to auscultation  CV: irre rate controlled  , no MRGs  Vasc: Radial pulses 2+  Abdomen: Soft, non-tender; no masses or HSM  Extremities: bl legs with lymphedema and icthyosis   Skin: no rash, lesions or ulcers  Psych: Alert and oriented to person, place and time  Neuro: Alert and interactive     No results for input(s): WBC, HGB, HCT, PLT in the last 72 hours. No results for input(s): NA, K, CL, CO2, BUN, CREATININE, CALCIUM in the last 72 hours. Invalid input(s): GLU    Assessment:    Principal Problem:    Infection due to extended-spectrum beta-lactamase-producing Escherichia coli  Active Problems:    Anxiety and depression    Chronic anticoagulation    Hypertension    Hyperlipidemia    PAF (paroxysmal atrial fibrillation) ([facility]) - currently in SR    Pulmonary hypertension    Diabetic neuropathy ([facility])    YEIMI (acute kidney injury) ([facility] [address]    Chronic diastolic heart failure ([facility])    Lymphedema of both lower extremities    Chronic anemia    Gastroesophageal reflux disease without esophagitis    UTI (urinary tract infection)  Resolved Problems:    * No resolved hospital problems.  *      Plan:    Admit to telemetry for evaluation of acute hypoxemic respiratory failure associated with morbid obesity, altered mental status with positive blood cultures with gram-negative rods 3 out of 4 bottles- pyelonephritis    Stop fluids   IV cefepime pending final culture results and sensitivities  On Invanz by ID -ESBL E. Coli in blood cultures  Continue BiPAP as at home  Continue nasal cannula as at home     Atrial fibrillation with RVR  Rate controlled , remains afib   Likely secondary to bacteremia/stress state  Resume home medications incl doac   Cardizem drip stopped, po metoprolol 100 mg p.o. twice daily  Possible dccv-will defer to cardiology for plan  Cardiology following     Uncontrolled diabetes mellitus type 2  Secondary to active infection with positive bacteremia  Add 8 units prandial insulin in addition to current home dose glargine plus insulin sliding scale    Am labs     DVT Prophylaxis   PT/OT  Discharge planning           Alex Fallon MD  11:07 AM  5/3/2021

## 2021-05-03 NOTE — CARE COORDINATION
Met with pt at bedside, re-verified discharge plan is to return to [facility], negative covid 3/54, pre-cert initiated 5/3, ambulette and envelope completed in soft-chart. Per, Colette Hector will need updated covid, provided to bedside RN to obtain today.

## 2021-05-03 NOTE — PROGRESS NOTES
[facility]) Physicians        CARDIOLOGY                 INPATIENT PROGRESS NOTE          PATIENT SEEN IN FOLLOW UP FOR: A Fib    Hospital Day: 47 Zoila Covarrubias is a 77year old patient known to Dr. Efrain Becerril: Denies any CP, breathing better, N oorthopnea    ROS: Review of rest of 10 systems negative except as mentioned above    OBJECTIVE: No acute distress. See Assessment     Diagnostics:       Telemetry: A fib    Echocardiogram reviewed: 4/14/21 (Dr. Jamie Alves)   Normal left ventricle size and systolic function.   Ejection fraction is visually estimated at 60-65%.   No regional wall motion abnormalities seen.   Mild concentric left ventricular hypertrophy.   Indeterminate diastolic function.   The left atrium is mildly dilated.   Moderately dilated right ventricle.   Right ventricle global systolic function is normal . TAPSE 32 mm.   Physiologic and/or trace mitral regurgitation is present.   No hemodynamically significant aortic stenosis is present.   Mild tricuspid regurgitation.  RVSP is 69 mmHg. Pulmonary hypertension is moderate .   No evidence for hemodynamically significant pericardial effusion.   Compared to prior echo of 2015, changes noted. Now, there is moderate pulmonary hypertension with moderately dilated RV.       Intake/Output Summary (Last 24 hours) at 5/3/2021 1457  Last data filed at 5/3/2021 1431  Gross per 24 hour   Intake 720 ml   Output 1525 ml   Net -805 ml       Labs:   CBC: No results for input(s): WBC, HGB, HCT, PLT in the last 72 hours. BMP: No results for input(s): NA, K, CO2, BUN, CREATININE, LABGLOM, CALCIUM in the last 72 hours. Invalid input(s): GLU  Mag: No results for input(s): MG in the last 72 hours. Phos: No results for input(s): PHOS in the last 72 hours. TSH: No results for input(s): TSH in the last 72 hours. HgA1c:     BNP: No results for input(s): BNP in the last 72 hours.   PT/INR: No results for input(s): PROTIME, INR in the last 72 use.   Lung auscultation - few rhonchi  CARDIOVASCULAR:     Heart Palpation - no palpable thrills   Heart Ausculation - Irregular rate and rhythm, 2/6 systolic murmur, No s3 or rub.   +lower extremity edema, no varicosities. Distal pulses palpable, no clubbing or cyanosis   ABDOMEN: Soft,. Bowel sounds present. MS: good muscle strength and tone. : Deferred  Rectal Exam: Deferred  SKIN: warm and dry no statis dermatitis or ulcers   NEURO / PSYCH: oriented to person, place        ASSESSMENT:   Principal Problem:    Infection due to extended-spectrum beta-lactamase-producing Escherichia coli        ASSESSMENT / PLAN:  1. Paroxysmal Atrial fibrillation with RVR - In the setting of bacteremia, YEIMI, anemia, probable ASM (untreated), Mildly dilated left atrium on TTE 4/2021  -Rates controlled, Continue Eliquis OAC     2. Moderate pulmonary HTN    3. Chronic HFpEF with dilated RV and evidence of pulmonary hypertension     4. History of PFO      5. History of TIA      6. COPD with chronic respiratory failure     7. Probable SAM: she has declined testing     8. Type 2 diabetes, insulin requiring with neuropathy. HGA1C 10.2      9. Hypertension: controlled     10. Severe hyperlipidemia (LDL of 243 in 1/2021) with past statin intolerance      11. Morbid Obesity     11. YEIMI - Check BMP, if better resume diuretcs     12. Bacteremia --> E coli UTI , sepsis, bacteremia, pyelonephritis --> ID following     13. Chronic iron deficiency anemia and new onset thrombocytopenia          Above recommendations discussed with her.           Electronically signed by Yazmin Bull MD on 5/3/2021 at 2:57 PM  [facility]) Cardiology

## 2021-05-04 LAB
ANION GAP SERPL CALCULATED.3IONS-SCNC: 8 MMOL/L (ref 7–16)
BUN BLDV-MCNC: 37 MG/DL (ref 6–23)
CALCIUM SERPL-MCNC: 9.5 MG/DL (ref 8.6–10.2)
CHLORIDE BLD-SCNC: 98 MMOL/L (ref 98–107)
CO2: 31 MMOL/L (ref 22–29)
CREAT SERPL-MCNC: 1 MG/DL (ref 0.5–1)
GFR AFRICAN AMERICAN: >60
GFR NON-AFRICAN AMERICAN: 55 ML/MIN/1.73
GLUCOSE BLD-MCNC: 126 MG/DL (ref 74–99)
METER GLUCOSE: 127 MG/DL (ref 74–99)
METER GLUCOSE: 240 MG/DL (ref 74–99)
METER GLUCOSE: 278 MG/DL (ref 74–99)
METER GLUCOSE: 314 MG/DL (ref 74–99)
POTASSIUM SERPL-SCNC: 4.2 MMOL/L (ref 3.5–5)
SODIUM BLD-SCNC: 137 MMOL/L (ref 132–146)

## 2021-05-04 PROCEDURE — 6370000000 HC RX 637 (ALT 250 FOR IP): Performed by: INTERNAL MEDICINE

## 2021-05-04 PROCEDURE — 99233 SBSQ HOSP IP/OBS HIGH 50: CPT | Performed by: INTERNAL MEDICINE

## 2021-05-04 PROCEDURE — 36415 COLL VENOUS BLD VENIPUNCTURE: CPT

## 2021-05-04 PROCEDURE — 05HB33Z INSERTION OF INFUSION DEVICE INTO RIGHT BASILIC VEIN, PERCUTANEOUS APPROACH: ICD-10-PCS | Performed by: INTERNAL MEDICINE

## 2021-05-04 PROCEDURE — 6360000002 HC RX W HCPCS: Performed by: INTERNAL MEDICINE

## 2021-05-04 PROCEDURE — 2700000000 HC OXYGEN THERAPY PER DAY

## 2021-05-04 PROCEDURE — 76937 US GUIDE VASCULAR ACCESS: CPT

## 2021-05-04 PROCEDURE — 2500000003 HC RX 250 WO HCPCS: Performed by: INTERNAL MEDICINE

## 2021-05-04 PROCEDURE — 6370000000 HC RX 637 (ALT 250 FOR IP): Performed by: CLINICAL NURSE SPECIALIST

## 2021-05-04 PROCEDURE — 2580000003 HC RX 258: Performed by: INTERNAL MEDICINE

## 2021-05-04 PROCEDURE — 82962 GLUCOSE BLOOD TEST: CPT

## 2021-05-04 PROCEDURE — 2060000000 HC ICU INTERMEDIATE R&B

## 2021-05-04 PROCEDURE — 80048 BASIC METABOLIC PNL TOTAL CA: CPT

## 2021-05-04 PROCEDURE — 36410 VNPNXR 3YR/> PHY/QHP DX/THER: CPT

## 2021-05-04 PROCEDURE — C1751 CATH, INF, PER/CENT/MIDLINE: HCPCS

## 2021-05-04 RX ORDER — BUMETANIDE 1 MG/1
2 TABLET ORAL DAILY
Status: DISCONTINUED | OUTPATIENT
Start: 2021-05-04 | End: 2021-05-06 | Stop reason: HOSPADM

## 2021-05-04 RX ORDER — BUMETANIDE 0.25 MG/ML
1 INJECTION, SOLUTION INTRAMUSCULAR; INTRAVENOUS ONCE
Status: COMPLETED | OUTPATIENT
Start: 2021-05-04 | End: 2021-05-04

## 2021-05-04 RX ADMIN — PANTOPRAZOLE SODIUM 40 MG: 40 TABLET, DELAYED RELEASE ORAL at 05:54

## 2021-05-04 RX ADMIN — INSULIN LISPRO 6 UNITS: 100 INJECTION, SOLUTION INTRAVENOUS; SUBCUTANEOUS at 20:06

## 2021-05-04 RX ADMIN — DILTIAZEM HYDROCHLORIDE 240 MG: 240 CAPSULE, EXTENDED RELEASE ORAL at 20:01

## 2021-05-04 RX ADMIN — EZETIMIBE 10 MG: 10 TABLET ORAL at 20:01

## 2021-05-04 RX ADMIN — LORAZEPAM 0.5 MG: 2 INJECTION INTRAMUSCULAR; INTRAVENOUS at 03:55

## 2021-05-04 RX ADMIN — ESCITALOPRAM 20 MG: 10 TABLET, FILM COATED ORAL at 09:08

## 2021-05-04 RX ADMIN — INSULIN GLARGINE 40 UNITS: 100 INJECTION, SOLUTION SUBCUTANEOUS at 20:06

## 2021-05-04 RX ADMIN — APIXABAN 5 MG: 5 TABLET, FILM COATED ORAL at 20:01

## 2021-05-04 RX ADMIN — DILTIAZEM HYDROCHLORIDE 240 MG: 240 CAPSULE, EXTENDED RELEASE ORAL at 09:08

## 2021-05-04 RX ADMIN — INSULIN LISPRO 8 UNITS: 100 INJECTION, SOLUTION INTRAVENOUS; SUBCUTANEOUS at 16:34

## 2021-05-04 RX ADMIN — BUMETANIDE 1 MG: 0.25 INJECTION INTRAMUSCULAR; INTRAVENOUS at 12:10

## 2021-05-04 RX ADMIN — TRAZODONE HYDROCHLORIDE 50 MG: 50 TABLET ORAL at 20:01

## 2021-05-04 RX ADMIN — GABAPENTIN 300 MG: 300 CAPSULE ORAL at 20:01

## 2021-05-04 RX ADMIN — MONTELUKAST SODIUM 10 MG: 10 TABLET, FILM COATED ORAL at 20:01

## 2021-05-04 RX ADMIN — APIXABAN 5 MG: 5 TABLET, FILM COATED ORAL at 09:09

## 2021-05-04 RX ADMIN — SODIUM CHLORIDE, PRESERVATIVE FREE 10 ML: 5 INJECTION INTRAVENOUS at 20:01

## 2021-05-04 RX ADMIN — INSULIN LISPRO 9 UNITS: 100 INJECTION, SOLUTION INTRAVENOUS; SUBCUTANEOUS at 16:34

## 2021-05-04 RX ADMIN — ACETAMINOPHEN 650 MG: 325 TABLET ORAL at 18:54

## 2021-05-04 RX ADMIN — ERTAPENEM SODIUM 1000 MG: 1 INJECTION, POWDER, LYOPHILIZED, FOR SOLUTION INTRAMUSCULAR; INTRAVENOUS at 12:10

## 2021-05-04 RX ADMIN — INSULIN GLARGINE 40 UNITS: 100 INJECTION, SOLUTION SUBCUTANEOUS at 09:09

## 2021-05-04 RX ADMIN — LORAZEPAM 0.5 MG: 2 INJECTION INTRAMUSCULAR; INTRAVENOUS at 11:03

## 2021-05-04 RX ADMIN — METOPROLOL SUCCINATE 100 MG: 100 TABLET, EXTENDED RELEASE ORAL at 20:01

## 2021-05-04 RX ADMIN — HEPARIN 100 UNITS: 100 SYRINGE at 20:01

## 2021-05-04 RX ADMIN — ACETAMINOPHEN 650 MG: 325 TABLET ORAL at 01:38

## 2021-05-04 RX ADMIN — SODIUM CHLORIDE, PRESERVATIVE FREE 10 ML: 5 INJECTION INTRAVENOUS at 09:08

## 2021-05-04 RX ADMIN — INSULIN LISPRO 8 UNITS: 100 INJECTION, SOLUTION INTRAVENOUS; SUBCUTANEOUS at 06:38

## 2021-05-04 RX ADMIN — LORAZEPAM 0.5 MG: 2 INJECTION INTRAMUSCULAR; INTRAVENOUS at 20:02

## 2021-05-04 RX ADMIN — GABAPENTIN 300 MG: 300 CAPSULE ORAL at 09:09

## 2021-05-04 RX ADMIN — INSULIN LISPRO 8 UNITS: 100 INJECTION, SOLUTION INTRAVENOUS; SUBCUTANEOUS at 11:28

## 2021-05-04 RX ADMIN — GABAPENTIN 300 MG: 300 CAPSULE ORAL at 14:06

## 2021-05-04 RX ADMIN — INSULIN LISPRO 6 UNITS: 100 INJECTION, SOLUTION INTRAVENOUS; SUBCUTANEOUS at 11:30

## 2021-05-04 RX ADMIN — MICONAZOLE NITRATE: 20.6 POWDER TOPICAL at 20:23

## 2021-05-04 RX ADMIN — METOPROLOL SUCCINATE 100 MG: 100 TABLET, EXTENDED RELEASE ORAL at 09:09

## 2021-05-04 ASSESSMENT — PAIN SCALES - GENERAL: PAINLEVEL_OUTOF10: 8

## 2021-05-04 NOTE — CARE COORDINATION
Discussed with attending; awaiting midline via iv team; will check with ID for d/c from their POV and stop date on Invanz once they round; possible discharge today pending the aforementioned and pre-cert (5/3). Spoke with Todd Grimm, will notify this CM when pre-cert is obtained.

## 2021-05-04 NOTE — PLAN OF CARE
Problem: Pain:  Description: Pain management should include both nonpharmacologic and pharmacologic interventions.   Goal: Pain level will decrease  Description: Pain level will decrease  Outcome: Met This Shift  Goal: Control of acute pain  Description: Control of acute pain  Outcome: Met This Shift     Problem: Skin Integrity:  Goal: Will show no infection signs and symptoms  Description: Will show no infection signs and symptoms  Outcome: Met This Shift     Problem: Falls - Risk of:  Goal: Will remain free from falls  Description: Will remain free from falls  Outcome: Met This Shift  Goal: Absence of physical injury  Description: Absence of physical injury  Outcome: Met This Shift

## 2021-05-04 NOTE — PROGRESS NOTES
Call placed to Dr. Maria Gimenez answering service for patient complaint of worsening anxiety. New orders placed.

## 2021-05-04 NOTE — PROGRESS NOTES
BiPAP as at home  Continue nasal cannula as at home     Atrial fibrillation with RVR  Rate controlled , remains afib   Likely secondary to bacteremia/stress state  Resume home medications incl doac   Cardizem drip stopped, po metoprolol 100 mg p.o. twice daily  Possible dccv-will defer to cardiology for plan  Cardiology following       Uncontrolled diabetes mellitus type 2  Secondary to active infection with positive bacteremia  Add 8 units prandial insulin in addition to current home dose glargine plus insulin sliding scale    Am labs     DVT Prophylaxis   PT/OT  Discharge planning - ok for home today onc abx finalized by ninfa Garza MD  10:47 AM  5/4/2021

## 2021-05-04 NOTE — PROGRESS NOTES
Department of Internal Medicine  Infectious Diseases   Progress Note      C/C : ESBL E coli sepsis, bacteremia     Denies fever or chills  Reports shortness of breath   Leg swelling   Afebrile     Current Facility-Administered Medications   Medication Dose Route Frequency Provider Last Rate Last Admin    lidocaine PF 1 % injection 5 mL  5 mL Intradermal Once Marquis Philippe MD        sodium chloride flush 0.9 % injection 5-40 mL  5-40 mL Intravenous 2 times per day Adrienne Gar MD   10 mL at 05/04/21 0908    sodium chloride flush 0.9 % injection 5-40 mL  5-40 mL Intravenous PRN Marquis Philippe MD        0.9 % sodium chloride infusion  25 mL Intravenous PRN Marquis Philippe MD        heparin flush 100 UNIT/ML injection 100 Units  1 mL Intravenous 2 times per day Marquis Philippe MD        heparin flush 100 UNIT/ML injection 100 Units  1 mL Intracatheter PRN Marquis Philippe MD        LORazepam (ATIVAN) injection 0.5 mg  0.5 mg Intravenous Q6H PRN Amara Church MD   0.5 mg at 05/04/21 1103    metoprolol succinate (TOPROL XL) extended release tablet 100 mg  100 mg Oral BID Tee Thurston MD   100 mg at 05/04/21 0909    ertapenem (INVanz) 1000 mg IVPB minibag  1,000 mg Intravenous Q24H Marquis Philippe MD   Stopped at 05/04/21 1302    insulin lispro (HUMALOG) injection vial 8 Units  8 Units Subcutaneous TID  Amara Church MD   8 Units at 05/04/21 1128    ezetimibe (ZETIA) tablet 10 mg  10 mg Oral Nightly RAUL Carrillo   10 mg at 05/03/21 2035    insulin lispro (HUMALOG) injection vial 0-18 Units  0-18 Units Subcutaneous TID  Amara Church MD   6 Units at 05/04/21 1130    insulin lispro (HUMALOG) injection vial 0-9 Units  0-9 Units Subcutaneous Nightly Amara Church MD   7 Units at 05/03/21 2147    white petrolatum ointment   Topical BID PRN Amara Church MD        albuterol (PROVENTIL) nebulizer solution 2.5 mg  2.5 mg Nebulization Q4H PRN Pierre Roldan MD   2.5 mg at 05/03/21 1537    apixaban (ELIQUIS) tablet 5 mg  5 mg Oral BID Lewis Dai MD   5 mg at 05/04/21 2064    calcium carbonate (TUMS) chewable tablet 500 mg  500 mg Oral TID PRREKHA Dai MD        dilTIAZem [facility]) extended release capsule 240 mg  240 mg Oral BID Lewis Dai MD   240 mg at 05/04/21 0908    escitalopram (LEXAPRO) tablet 20 mg  20 mg Oral Daily Lewis Dai MD   20 mg at 05/04/21 0908    gabapentin (NEURONTIN) capsule 300 mg  300 mg Oral TID Lewis Dai MD   300 mg at 05/04/21 1406    insulin glargine (LANTUS) injection vial 40 Units  40 Units Subcutaneous BID Lewis Dai MD   40 Units at 05/04/21 0909    pantoprazole (PROTONIX) tablet 40 mg  40 mg Oral QAM AC Lewis Dai MD   40 mg at 05/04/21 0554    miconazole (MICOTIN) 2 % powder   Topical BID Lewis Dai MD   Given at 05/03/21 2036    montelukast (SINGULAIR) tablet 10 mg  10 mg Oral Nightly Lewis Dai MD   10 mg at 05/03/21 2035    traZODone (DESYREL) tablet 50 mg  50 mg Oral Nightly Lewis Dai MD   50 mg at 05/03/21 2035    glucose (GLUTOSE) 40 % oral gel 15 g  15 g Oral PRN Lewis Dai MD        dextrose 50 % IV solution  12.5 g Intravenous PRN Lewis Dai MD        glucagon (rDNA) injection 1 mg  1 mg Intramuscular PRN Lewis Dai MD        dextrose 5 % solution  100 mL/hr Intravenous PRN Lewis Dai MD        acetaminophen (TYLENOL) tablet 650 mg  650 mg Oral Q6H PRN Lewis Dai MD   650 mg at 05/04/21 0138    Or    acetaminophen (TYLENOL) suppository 650 mg  650 mg Rectal Q6H PRN Lewis Dai MD        magnesium hydroxide (MILK OF MAGNESIA) 400 MG/5ML suspension 30 mL  30 mL Oral Daily PRN Lewis Dai MD   30 mL at 05/01/21 0551    promethazine (PHENERGAN) tablet 12.5 mg  12.5 mg Oral Q6H PRN Lewis Dai MD   12.5 mg at 05/02/21 0442    Or    ondansetron (ZOFRAN) injection 4 mg  4 mg Intravenous Q6H PRN Paula Alex MD             REVIEW OF SYSTEMS:    CONSTITUTIONAL: denies fever   HEENT: denies blurring of vision or double vision, denies hearing problem  RESPIRATORY:  Shortness of breath  CARDIOVASCULAR:  Denies palpitation  GASTROINTESTINAL:  Denies abdomen pain, diarrhea or constipation. GENITOURINARY:  Denies burning urination or frequency of urination  INTEGUMENT: denies wound , rash  HEMATOLOGIC/LYMPHATIC:  Denies lymph node swelling, gum bleeding or easy bruising. MUSCULOSKELETAL:Chronic leg swelling , denies pain   NEUROLOGICAL:  Denies light headed, dizziness, loss of consciousness      PHYSICAL EXAM:      Vitals:     /77   Pulse 98   Temp 97.2 °F (36.2 °C) (Infrared)   Resp 16   Ht 5' 3\" (1.6 m)   Wt 272 lb (123.4 kg)   LMP  (LMP Unknown)   SpO2 98%   BMI 48.18 kg/m²     General Appearance:    Awake, alert , no acute distress  Morbidly obese . Head:    Normocephalic, atraumatic   Eyes:    No pallor, no icterus,   Ears:    No obvious deformity or drainage.    Nose:   No nasal drainage   Throat:   Mucosa moist, no oral thrush   Neck:   Supple, no lymphadenopathy   Back:     Right CVA tenderness   Lungs:     Crackles at the bases    Heart:    Irregular    Abdomen:     Soft, non-tender, bowel sounds present    Extremities:   Bilateral chronic lymphedema, non tender   Right arm midline    Pulses:   Dorsalis pedis not  palpable    Skin:   no rashes or lesions     CBC with Differential:      Lab Results   Component Value Date    WBC 8.4 04/30/2021    RBC 3.79 04/30/2021    HGB 9.0 04/30/2021    HCT 29.9 04/30/2021    PLT 91 04/30/2021    MCV 78.9 04/30/2021    MCH 23.7 04/30/2021    MCHC 30.1 04/30/2021    RDW 15.6 04/30/2021    NRBC 0.0 12/04/2018    SEGSPCT 85 01/29/2014    METASPCT 3.5 12/04/2018    LYMPHOPCT 7.4 04/29/2021    MONOPCT 5.5 04/29/2021    MYELOPCT 2.6 12/04/2018    BASOPCT 0.2 04/29/2021    MONOSABS 0.51 04/29/2021    LYMPHSABS 0.69 04/29/2021    EOSABS 0.02 04/29/2021 BASOSABS 0.02 04/29/2021       CMP     Lab Results   Component Value Date     05/04/2021    K 4.2 05/04/2021    K 3.9 04/13/2021    CL 98 05/04/2021    CO2 31 05/04/2021    BUN 37 05/04/2021    CREATININE 1.0 05/04/2021    GFRAA >60 05/04/2021    LABGLOM 55 05/04/2021    GLUCOSE 126 05/04/2021    GLUCOSE 181 12/16/2011    PROT 6.5 04/29/2021    LABALBU 3.2 04/29/2021    LABALBU 4.5 11/02/2011    CALCIUM 9.5 05/04/2021    BILITOT 0.4 04/29/2021    ALKPHOS 76 04/29/2021    AST 19 04/29/2021    ALT 10 04/29/2021         Hepatic Function Panel:    Lab Results   Component Value Date    ALKPHOS 76 04/29/2021    ALT 10 04/29/2021    AST 19 04/29/2021    PROT 6.5 04/29/2021    BILITOT 0.4 04/29/2021    BILIDIR <0.2 12/26/2019    IBILI see below 12/26/2019    LABALBU 3.2 04/29/2021    LABALBU 4.5 11/02/2011       PT/INR:    Lab Results   Component Value Date    PROTIME 19.5 04/29/2021    INR 1.8 04/29/2021       TSH:    Lab Results   Component Value Date    TSH 0.575 01/15/2021       U/A:    Lab Results   Component Value Date    NITRITE negative 07/26/2018    COLORU Yellow 04/29/2021    PHUR 5.0 04/29/2021    WBCUA 5-10 04/29/2021    RBCUA 10-20 04/29/2021    YEAST Present 05/28/2020    BACTERIA MANY 04/29/2021    CLARITYU CLOUDY 04/29/2021    SPECGRAV >=1.030 04/29/2021    LEUKOCYTESUR MODERATE 04/29/2021    UROBILINOGEN 0.2 04/29/2021    BILIRUBINUR SMALL 04/29/2021    BILIRUBINUR negative 07/26/2018    BLOODU LARGE 04/29/2021    GLUCOSEU Negative 04/29/2021    AMORPHOUS FEW 08/14/2020       ABG:  No results found for: GNE7RST, BEART, U5NKJLZG, PHART, THGBART, ESS9IIV, PO2ART, KLK8GWP    MICROBIOLOGY:    Blood culture -      Susceptibility    Escherichia coli (1)    Antibiotic Interpretation MALKA Status    ampicillin Resistant >=^32 mcg/mL     ceFAZolin Resistant >=^64 mcg/mL     cefTRIAXone Resistant ^32 mcg/mL     Confirmatory Extended Spectrum Beta-Lactamase Positive Pos mcg/mL     ertapenem Sensitive <=^0.12 mcg/mL     gentamicin Sensitive <=^1 mcg/mL     levofloxacin Resistant >=^8 mcg/mL     trimethoprim-sulfamethoxazole Resistant >=^320 mcg/mL     Lab and Collection          COVID 19 negative         Urine Culture - pending       Radiology :    Chest X ray- mild congestion     CT scan of abdomen and pelvis -  Impression:        Right adrenal mass measuring approximately 4.3 cm in diameter, increased in   the interval since the prior examination.  Indeterminate lesion, malignancy   must be suspected and cannot be excluded.  Surgical consultation recommended. Consider biochemical evaluation for functional status and pheochromocytoma   prior to resection. Splenomegaly. Atelectatic changes in the right lung base with no airspace consolidation or   pleural effusions. Perinephric stranding bilaterally, left greater than right which may be   chronic.  Renal inflammatory process cannot be excluded.  No urolithiasis or   obstructive uropathy. IMPRESSION:     1. ESBL E coli UTI , sepsis, bacteremia, pyelonephritis   2. Lactic acidosis   3. Morbid obesity, chronic lymphedema   4.  Resp failure, CHF,COPD       RECOMMENDATIONS:      1. IV invanz 1 gram IV q 24 hrs  X 10 days   OK to discharge from ID POV

## 2021-05-04 NOTE — PROGRESS NOTES
[facility]) Physicians        CARDIOLOGY                 INPATIENT PROGRESS NOTE          PATIENT SEEN IN FOLLOW UP FOR: A Fib    Hospital Day: Rishi Dewitt is a 77year old patient known to Dr. Shan Ferreira: Denies any CP, breathing better, No orthopnea. No palpitations or dizzy    ROS: Review of rest of 10 systems negative except as mentioned above    OBJECTIVE: No acute distress. See Assessment     Diagnostics:       Telemetry: A fib    Echocardiogram reviewed: 4/14/21 (Dr. Manny Swartz)   Normal left ventricle size and systolic function.   Ejection fraction is visually estimated at 60-65%.   No regional wall motion abnormalities seen.   Mild concentric left ventricular hypertrophy.   Indeterminate diastolic function.   The left atrium is mildly dilated.   Moderately dilated right ventricle.   Right ventricle global systolic function is normal . TAPSE 32 mm.   Physiologic and/or trace mitral regurgitation is present.   No hemodynamically significant aortic stenosis is present.   Mild tricuspid regurgitation.  RVSP is 69 mmHg. Pulmonary hypertension is moderate .   No evidence for hemodynamically significant pericardial effusion.   Compared to prior echo of 2015, changes noted. Now, there is moderate pulmonary hypertension with moderately dilated RV.       Intake/Output Summary (Last 24 hours) at 5/4/2021 1657  Last data filed at 5/4/2021 1425  Gross per 24 hour   Intake 480 ml   Output 2100 ml   Net -1620 ml       Labs:   CBC: No results for input(s): WBC, HGB, HCT, PLT in the last 72 hours. BMP:   Recent Labs     05/04/21  0630      K 4.2   CO2 31*   BUN 37*   CREATININE 1.0   LABGLOM 55   CALCIUM 9.5     Mag: No results for input(s): MG in the last 72 hours. Phos: No results for input(s): PHOS in the last 72 hours. TSH: No results for input(s): TSH in the last 72 hours. HgA1c:     BNP: No results for input(s): BNP in the last 72 hours.   PT/INR: No results for input(s): PROTIME, INR in the last 72 hours. APTT:No results for input(s): APTT in the last 72 hours. CARDIAC ENZYMES:No results for input(s): CKTOTAL, CKMB, CKMBINDEX, TROPONINI in the last 72 hours. FASTING LIPID PANEL:  Lab Results   Component Value Date    CHOL 355 2021    HDL 52 2021    LDLCALC 243 2021    TRIG 298 2021     LIVER PROFILE:No results for input(s): AST, ALT, LABALBU in the last 72 hours. Current Inpatient Medications:   lidocaine PF  5 mL Intradermal Once    sodium chloride flush  5-40 mL Intravenous 2 times per day    heparin flush  1 mL Intravenous 2 times per day    metoprolol succinate  100 mg Oral BID    ertapenem (INVanz) IVPB  1,000 mg Intravenous Q24H    insulin lispro  8 Units Subcutaneous TID WC    ezetimibe  10 mg Oral Nightly    insulin lispro  0-18 Units Subcutaneous TID WC    insulin lispro  0-9 Units Subcutaneous Nightly    apixaban  5 mg Oral BID    dilTIAZem  240 mg Oral BID    escitalopram  20 mg Oral Daily    gabapentin  300 mg Oral TID    insulin glargine  40 Units Subcutaneous BID    pantoprazole  40 mg Oral QAM AC    miconazole   Topical BID    montelukast  10 mg Oral Nightly    traZODone  50 mg Oral Nightly       IV Infusions (if any):   sodium chloride      dextrose           PHYSICAL EXAM:     CONSTITUTIONAL:   /80   Pulse 104   Temp 97.5 °F (36.4 °C) (Infrared)   Resp 18   Ht 5' 3\" (1.6 m)   Wt 272 lb (123.4 kg)   LMP  (LMP Unknown)   SpO2 97%   BMI 48.18 kg/m²   Pulse  Av  Min: 98  Max: 427  Systolic (05CTB), ZNT:100 , Min:124 , LETI:706    Diastolic (38CAD), JSE:04, Min:77, Max:80    In general, this is a well developed, well nourished who appears stated age.  awake, alert, no apparent distress  HEENT: eyes -conjunctivae pink,  Throat - Oral mucosa moist.   Neck-  no stridor, no noted enlargement of the thyroid, no carotid bruit no jugular venous distention   RESPIRATORY: Chest symmetrical and non-tender to palpation. No accessory muscle use. Lung auscultation - few rhonchi  CARDIOVASCULAR:     Heart Palpation - no palpable thrills   Heart Ausculation - Irregular rate and rhythm, 2/6 systolic murmur, No s3 or rub.   +lower extremity edema, no varicosities. Distal pulses palpable, no clubbing or cyanosis   ABDOMEN: Soft,. Bowel sounds present. MS: good muscle strength and tone. : Deferred  Rectal Exam: Deferred  SKIN: warm and dry no statis dermatitis or ulcers   NEURO / PSYCH: oriented to person, place          ASSESSMENT / PLAN:  1. Paroxysmal Atrial fibrillation with RVR - In the setting of bacteremia, YEIMI, anemia, probable SAM (untreated), Mildly dilated left atrium on TTE 4/2021  - Rates controlled, Continue Eliquis OAC     2. Bacteremia --> E coli UTI , sepsis, bacteremia, pyelonephritis --> ID following    3. Chronic HFpEF with dilated RV       4. Moderate pulmonary hypertension     5. History of TIA      6. COPD with chronic respiratory failure     7. Probable SAM: she has declined testing     8. Type 2 diabetes, insulin requiring with neuropathy. HGA1C 10.2      9. Hypertension: controlled     10. Severe hyperlipidemia (LDL of 243 in 1/2021) with past statin intolerance      11. Morbid Obesity     11. YEIMI - Resolved, Resume diuretics - Bumex 2mg PO BID - Monitor renal Fn     12. Chronic iron deficiency anemia         Above recommendations discussed with her.       Tentative discharge 24-48 hours    Electronically signed by Brian Rosario MD on 5/4/2021 at 4:57 PM  [facility]) Cardiology

## 2021-05-04 NOTE — PROGRESS NOTES
Power midline Placement 5/4/2021    Product number: [id number]   Lot Number: [id number]      Ultrasound: yes   Right Cephalic vein:                Upper Arm Circumference: 32cm    Size: 4.5fsl    Exposed Length: 3cm    Internal Length: 12cm   Cut: 0   Vein Measurement: 0.6cm    Romeo Strong  5/4/2021  11:29 AM

## 2021-05-04 NOTE — PLAN OF CARE
Problem: Pain:  Goal: Control of chronic pain  Description: Control of chronic pain  Outcome: Ongoing     Problem: Skin Integrity:  Goal: Will show no infection signs and symptoms  Description: Will show no infection signs and symptoms  Outcome: Ongoing  Goal: Absence of new skin breakdown  Description: Absence of new skin breakdown  Outcome: Ongoing

## 2021-05-05 LAB
METER GLUCOSE: 231 MG/DL (ref 74–99)
METER GLUCOSE: 247 MG/DL (ref 74–99)
METER GLUCOSE: 260 MG/DL (ref 74–99)
METER GLUCOSE: 398 MG/DL (ref 74–99)

## 2021-05-05 PROCEDURE — 6360000002 HC RX W HCPCS: Performed by: INTERNAL MEDICINE

## 2021-05-05 PROCEDURE — 6370000000 HC RX 637 (ALT 250 FOR IP): Performed by: CLINICAL NURSE SPECIALIST

## 2021-05-05 PROCEDURE — 6370000000 HC RX 637 (ALT 250 FOR IP): Performed by: INTERNAL MEDICINE

## 2021-05-05 PROCEDURE — 97530 THERAPEUTIC ACTIVITIES: CPT

## 2021-05-05 PROCEDURE — 2060000000 HC ICU INTERMEDIATE R&B

## 2021-05-05 PROCEDURE — 82962 GLUCOSE BLOOD TEST: CPT

## 2021-05-05 PROCEDURE — 99232 SBSQ HOSP IP/OBS MODERATE 35: CPT | Performed by: INTERNAL MEDICINE

## 2021-05-05 PROCEDURE — 2700000000 HC OXYGEN THERAPY PER DAY

## 2021-05-05 PROCEDURE — 97535 SELF CARE MNGMENT TRAINING: CPT

## 2021-05-05 PROCEDURE — 2580000003 HC RX 258: Performed by: INTERNAL MEDICINE

## 2021-05-05 RX ORDER — METOPROLOL SUCCINATE 100 MG/1
100 TABLET, EXTENDED RELEASE ORAL 2 TIMES DAILY
Qty: 30 TABLET | Refills: 3 | Status: ON HOLD | OUTPATIENT
Start: 2021-05-05 | End: 2022-10-04

## 2021-05-05 RX ADMIN — INSULIN LISPRO 9 UNITS: 100 INJECTION, SOLUTION INTRAVENOUS; SUBCUTANEOUS at 06:14

## 2021-05-05 RX ADMIN — ACETAMINOPHEN 650 MG: 325 TABLET ORAL at 04:15

## 2021-05-05 RX ADMIN — MONTELUKAST SODIUM 10 MG: 10 TABLET, FILM COATED ORAL at 20:41

## 2021-05-05 RX ADMIN — INSULIN LISPRO 8 UNITS: 100 INJECTION, SOLUTION INTRAVENOUS; SUBCUTANEOUS at 12:02

## 2021-05-05 RX ADMIN — SODIUM CHLORIDE, PRESERVATIVE FREE 10 ML: 5 INJECTION INTRAVENOUS at 20:53

## 2021-05-05 RX ADMIN — INSULIN LISPRO 6 UNITS: 100 INJECTION, SOLUTION INTRAVENOUS; SUBCUTANEOUS at 12:01

## 2021-05-05 RX ADMIN — APIXABAN 5 MG: 5 TABLET, FILM COATED ORAL at 20:42

## 2021-05-05 RX ADMIN — PANTOPRAZOLE SODIUM 40 MG: 40 TABLET, DELAYED RELEASE ORAL at 05:37

## 2021-05-05 RX ADMIN — LORAZEPAM 0.5 MG: 2 INJECTION INTRAMUSCULAR; INTRAVENOUS at 23:28

## 2021-05-05 RX ADMIN — INSULIN LISPRO 15 UNITS: 100 INJECTION, SOLUTION INTRAVENOUS; SUBCUTANEOUS at 16:24

## 2021-05-05 RX ADMIN — TRAZODONE HYDROCHLORIDE 50 MG: 50 TABLET ORAL at 20:42

## 2021-05-05 RX ADMIN — DILTIAZEM HYDROCHLORIDE 240 MG: 240 CAPSULE, EXTENDED RELEASE ORAL at 20:42

## 2021-05-05 RX ADMIN — GABAPENTIN 300 MG: 300 CAPSULE ORAL at 13:23

## 2021-05-05 RX ADMIN — MICONAZOLE NITRATE: 20.6 POWDER TOPICAL at 22:11

## 2021-05-05 RX ADMIN — ERTAPENEM SODIUM 1000 MG: 1 INJECTION, POWDER, LYOPHILIZED, FOR SOLUTION INTRAMUSCULAR; INTRAVENOUS at 11:48

## 2021-05-05 RX ADMIN — MICONAZOLE NITRATE: 20.6 POWDER TOPICAL at 11:27

## 2021-05-05 RX ADMIN — INSULIN GLARGINE 40 UNITS: 100 INJECTION, SOLUTION SUBCUTANEOUS at 08:24

## 2021-05-05 RX ADMIN — DILTIAZEM HYDROCHLORIDE 240 MG: 240 CAPSULE, EXTENDED RELEASE ORAL at 08:24

## 2021-05-05 RX ADMIN — ESCITALOPRAM 20 MG: 10 TABLET, FILM COATED ORAL at 08:23

## 2021-05-05 RX ADMIN — INSULIN GLARGINE 40 UNITS: 100 INJECTION, SOLUTION SUBCUTANEOUS at 20:38

## 2021-05-05 RX ADMIN — SODIUM CHLORIDE, PRESERVATIVE FREE 10 ML: 5 INJECTION INTRAVENOUS at 11:22

## 2021-05-05 RX ADMIN — HEPARIN 100 UNITS: 100 SYRINGE at 20:41

## 2021-05-05 RX ADMIN — INSULIN LISPRO 3 UNITS: 100 INJECTION, SOLUTION INTRAVENOUS; SUBCUTANEOUS at 20:40

## 2021-05-05 RX ADMIN — GABAPENTIN 300 MG: 300 CAPSULE ORAL at 08:23

## 2021-05-05 RX ADMIN — EZETIMIBE 10 MG: 10 TABLET ORAL at 20:42

## 2021-05-05 RX ADMIN — INSULIN LISPRO 8 UNITS: 100 INJECTION, SOLUTION INTRAVENOUS; SUBCUTANEOUS at 16:26

## 2021-05-05 RX ADMIN — HEPARIN 100 UNITS: 100 SYRINGE at 11:23

## 2021-05-05 RX ADMIN — APIXABAN 5 MG: 5 TABLET, FILM COATED ORAL at 08:23

## 2021-05-05 RX ADMIN — BUMETANIDE 2 MG: 1 TABLET ORAL at 08:23

## 2021-05-05 RX ADMIN — METOPROLOL SUCCINATE 100 MG: 100 TABLET, EXTENDED RELEASE ORAL at 08:24

## 2021-05-05 RX ADMIN — INSULIN LISPRO 8 UNITS: 100 INJECTION, SOLUTION INTRAVENOUS; SUBCUTANEOUS at 06:17

## 2021-05-05 RX ADMIN — GABAPENTIN 300 MG: 300 CAPSULE ORAL at 20:42

## 2021-05-05 RX ADMIN — METOPROLOL SUCCINATE 100 MG: 100 TABLET, EXTENDED RELEASE ORAL at 20:42

## 2021-05-05 ASSESSMENT — PAIN SCALES - GENERAL
PAINLEVEL_OUTOF10: 0

## 2021-05-05 NOTE — DISCHARGE INSTR - COC
Continuity of Care Form    Patient Name: Wolf Couch   :  1955  MRN:  [id number]    Admit date:  2021  Discharge date:  ***    Code Status Order: Full Code   Advance Directives:   Advance Care Flowsheet Documentation       Date/Time Healthcare Directive Type of Healthcare Directive Copy in [address] Agent's Name Healthcare Agent's Phone Number    21 1523  No, patient does not have an advance directive for healthcare treatment -- -- -- -- --            Admitting Physician:  Adam Lima MD  PCP: Golden Priest    Discharging Nurse: [facility] Unit/Room#: 0166/1085-Z  Discharging Unit Phone Number: ***    Emergency Contact:   Extended Emergency Contact Information  Primary Emergency Contact: Felicity Horn of [address] Phone: [phone]  Mobile Phone: [phone]  Relation: Child   needed? No  Secondary Emergency Contact: [facility]  Address: [address] [place] [address] of [address] Phone: [phone]  Mobile Phone: [phone]  Relation: Child   needed? No    Past Surgical History:  Past Surgical History:   Procedure Laterality Date    ANOSCOPY  10/25/2006    injection of anal sphincter with Botox, Franklyn Ortiz/Timmy, [facility]    ANOSCOPY  2007    injection of anal sphincter with Botox, Dr. Faby Lagunas, [facility]    ANOSCOPY  2007    injection of anal sphincter with Botox, Franklyn Rosario [facility]    ANUS SURGERY  2006    Lateral sphincterotomy for chronic anal fissure, Dr. Ivan Bertrand, [facility]  2012    anal exam and injection of Botox 100 units into anal sphincter, Laila Rosario, [facility]    COLONOSCOPY  2004    cecal polyp, bx (no pathologic changes), Dr. Sadaf Garner, [facility]    COLONOSCOPY  2006    snare polypectomy terminal ileum polyp (granulation tissue polyp) and anal polyp (inflammatory/papilla), Dr. Mode Babin, [facility]    COLONOSCOPY  3/23/2012    bx/cauterization proximal ascending colon polyp, injection of anal sphincter with Botox, Dr. Mode Babin, [facility]    ENDOSCOPY, COLON, DIAGNOSTIC      FRACTURE SURGERY      R leg fracture with surgery for repair    JOINT REPLACEMENT  2003    L knee    ME DEBRIDEMENT, SKIN, SUB-Q TISSUE,MUSCLE,=<20 SQ CM Left 11/30/2018    LEFT LEG EXCISIONAL  DEBRIDEMENT WITH TISSUE BIOPSY performed by Aung Adkins DPM at [address] [place] [address]  1/20/2004    gastritis, bx (no H pylori), Dr. Sally Ramos, [place] [address] ENDOSCOPY N/A 6/1/2018    EGD BIOPSY performed by Shavon Ott MD at [place] [address] N/A 11/9/2018    EGD BIOPSY performed by Shavon Ott MD at [facility] ENDOSCOPY       Immunization History:   Immunization History   Administered Date(s) Administered    Influenza 10/26/2012    Influenza Virus Vaccine 10/08/2013, 09/04/2015    Influenza, High Dose (Fluzone 65 yrs and older) 11/07/2019    Influenza, Quadv, IM, PF (6 mo and older Fluzone, Flulaval, Fluarix, and 3 yrs and older Afluria) 10/24/2016, 12/14/2017    Pneumococcal Polysaccharide (Tazuczove15) 09/04/2015    Zoster Live (Zostavax) 01/02/2015       Active Problems:  Patient Active Problem List   Diagnosis Code    Uncontrolled diabetes mellitus ([facility] [address] [facility] E11.65    Depression F32.9    Hypertension I10    Hyperlipidemia E78.5    Osteoarthritis M19.90    PAF (paroxysmal atrial fibrillation) ([facility] [address] [facility] - currently in SR I48.0    Pulmonary hypertension I27.20    Chronic sinusitis J32.9    Chronic pain G89.29    Steatohepatitis K75.81    Franklin's cyst of knee M71.20    Diabetic neuropathy (HCC) E11.40    Iron deficiency E61.1    LVH (left ventricular hypertrophy) I51.7    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    Positive hepatitis C antibody test R76.8    PFO (patent foramen ovale) 05/03/21 05/03/21 05/03/21 COVID-19, Rapid (Ordered)   05/03/21 Rule-Out Test Resulted    COVID-19 Rule Out 04/29/21 04/29/21 04/29/21 COVID-19, Rapid (Ordered)   04/29/21 Rule-Out Test Resulted    COVID-19 Rule Out 01/13/21 01/13/21 01/13/21 Respiratory Panel, Molecular, with COVID-19 (Restricted: peds pts or suitable admitted adults) (Ordered)   01/14/21 Arian Pichardo RN    COVID-19 Rule Out 01/12/21 01/12/21 01/12/21 COVID-19 (Ordered)   01/12/21 Rule-Out Test Resulted    COVID-19 Rule Out 06/02/20 06/02/20 06/02/20 COVID-19 (Ordered)   06/02/20 Rule-Out Test Resulted    COVID-19 Rule Out 05/29/20 05/29/20 05/29/20 COVID-19 (Ordered)   05/29/20 Rule-Out Test Resulted    ESBL (Extended Spectrum Beta Lactamase)  12/04/18 12/04/18 Amilcar Lilly RN   09/24/19 Amilcar Lilly RN    E. Coli, wound-leg,11/30/18            Nurse Assessment:  Last Vital Signs: /61   Pulse 94   Temp 97.5 °F (36.4 °C) (Temporal)   Resp 20   Ht 5' 3\" (1.6 m)   Wt 272 lb (123.4 kg)   LMP  (LMP Unknown)   SpO2 95%   BMI 48.18 kg/m²     Last documented pain score (0-10 scale): Pain Level: 0  Last Weight:   Wt Readings from Last 1 Encounters:   05/01/21 272 lb (123.4 kg)     Mental Status:  {IP PT MENTAL STATUS:20030:::0}    IV Access:  { LENO IV ACCESS:747071716:::0}    Nursing Mobility/ADLs:  Walking   {CHP DME ADLs:394549072:::0}  Transfer  {CHP DME ADLs:756667985:::0}  Bathing  {CHP DME ADLs:607558443:::0}  Dressing  {CHP DME ADLs:252033788:::0}  Toileting  {CHP DME ADLs:619197223:::0}  Feeding  {CHP DME ADLs:551679223:::0}  Med Admin  {CHP DME ADLs:744231982:::0}  Med Delivery   { LENO MED Delivery:195875520:::0}    Wound Care Documentation and Therapy:  Wound 12/24/19 Coccyx (Active)   Number of days: 497       Wound 12/24/19 Leg Right; Anterior;Posterior (Active)   Number of days: 497       Wound 12/24/19 Leg Left; Anterior;Posterior (Active)   Number of days: 497       Wound 05/29/20 Buttocks Inner;Right (Active)

## 2021-05-05 NOTE — PROGRESS NOTES
Physical Therapy    Physical Therapy Treatment     Name: Kinga Grimes  : 1955  MRN: [id number]    Referring Provider:  Lynette Hussein MD    Date of Service: 2021    Evaluating PT:  Ernie Youssef, PT, DPT [id number]      Room #:  0695/9093-G  Diagnosis:  UTI  PMHx/PSHx:  Afib, asthma, inappropriate sinus tachycardia, HTN, HLD, disc disorder, anal fissure, LVH, respiratory acidosis, GERD, CHF, COPD, anxiety and depression, type 2 diabetes  Procedure/Surgery:  None this admission  Precautions:  Falls, O2, B LE edema  Equipment Needs:  TBD    SUBJECTIVE:  Pt admitted from [facility]) where she was working with therapy. Pt reports she had been able to amb short distances (20-30 ft) with FWW and therapist assistance. She reports requiring assistance with ADLs at recent baseline. Pt lives with her  and two grandsons in a 2 story house with 3 stairs vs ramp to enter. Bed is on 2nd floor and bath is on 2nd floor. However, pt reports having a permanent first floor set up. She states she sleeps in her lift chair or on the couch. No first floor bathroom available but pt does have a BSC. Pt ambulated with rollator prior to initial admission. OBJECTIVE:   Initial Evaluation  Date: 5/3/21 Treatment   Short Term/ Long Term   Goals   AM-PAC 6 Clicks 15/17 74/72    Was pt agreeable to Eval/treatment? yes Yes     Does pt have pain? 5/10 R LE with WB  No pain at rest 7/10 RLE     Bed Mobility  Rolling: NT  Supine to sit: NT  Sit to supine: NT  Scooting: NT Rolling: NT  Supine to sit: NT  Sit to supine: ModA  Scooting: NT Rolling: Mod Independent   Supine to sit: Mod Independent   Sit to supine: Mod Independent   Scooting: Mod Independent    Transfers Sit to stand: Mod A  Stand to sit: Mod A  Stand pivot: Mod A with FWW Sit to stand: [address] [facility]  Stand to sit: ModA  Stand pivot: ModA with FWW Sit to stand: Mod Independent   Stand to sit: Mod Independent   Stand pivot:  Mod Independent with AAD   Ambulation complete task     Sitting EOB for >15 minutes for upright tolerance, postural awareness and BLE ROM. Seated in chair >5 minutes.  STS and pivot transfer training - pt educated on proper hand and foot placement, safety and sequencing, and use of WW to safely complete sit<>stand and pivot transfers with hands on assistance to complete task safely     Gait training- pt was given verbal and tactile cues to facilitate improved foot clearance and use of walker during ambulation as well as provided with physical assistance to complete task. Ambulation completed in 2 bouts with rest in chair between. PLAN OF CARE:    Making progress, continue PT POC    Time in  0750  Time out  0817    Total Treatment Time  27 minutes     Evaluation Time includes thorough review of current medical information, gathering information on past medical history/social history and prior level of function, completion of standardized testing/informal observation of tasks, assessment of data and education on plan of care and goals.     CPT codes:  [] Low Complexity PT evaluation 93019  [] Moderate Complexity PT evaluation 04990  [] High Complexity PT evaluation 37577  [] PT Re-evaluation 14275  [] Gait training 98231 0 minutes  [] Manual therapy 96354 0 minutes  [x] Therapeutic activities 16902 27 minutes  [] Therapeutic exercises 13834 0 minutes  [] Neuromuscular reeducation 73316 0 minutes     Malik Choi, PT, DPT  [id number]

## 2021-05-05 NOTE — PLAN OF CARE
Problem: Pain:  Goal: Pain level will decrease  Description: Pain level will decrease  5/5/2021 0241 by Chivo Mann RN  Outcome: Met This Shift     Problem: Pain:  Goal: Control of acute pain  Description: Control of acute pain  5/5/2021 0241 by Chivo Mann RN  Outcome: Met This Shift     Problem: Pain:  Goal: Control of chronic pain  Description: Control of chronic pain  Outcome: Met This Shift     Problem: Skin Integrity:  Goal: Will show no infection signs and symptoms  Description: Will show no infection signs and symptoms  5/5/2021 0241 by Chivo Mann RN  Outcome: Met This Shift     Problem: Skin Integrity:  Goal: Absence of new skin breakdown  Description: Absence of new skin breakdown  Outcome: Met This Shift     Problem: Falls - Risk of:  Goal: Will remain free from falls  Description: Will remain free from falls  5/5/2021 0241 by Chivo Mann RN  Outcome: Met This Shift     Problem: Falls - Risk of:  Goal: Absence of physical injury  Description: Absence of physical injury  5/5/2021 0241 by Chivo Mann RN  Outcome: Met This Shift

## 2021-05-05 NOTE — PROGRESS NOTES
[facility]) Physicians        CARDIOLOGY                 INPATIENT PROGRESS NOTE          PATIENT SEEN IN FOLLOW UP FOR: A Fib    Hospital Day: 2460 Josias Hugo is a 77year old patient known to Dr. Martha Miner: Denies any CP, breathing better, No orthopnea. No palpitations or dizzy    ROS: Review of rest of 8 systems negative except as mentioned above    OBJECTIVE: No acute distress. See Assessment     Diagnostics:       Telemetry: A fib    Echocardiogram reviewed: 4/14/21 (Dr. Prema Patino)   Normal left ventricle size and systolic function.   Ejection fraction is visually estimated at 60-65%.   No regional wall motion abnormalities seen.   Mild concentric left ventricular hypertrophy.   Indeterminate diastolic function.   The left atrium is mildly dilated.   Moderately dilated right ventricle.   Right ventricle global systolic function is normal . TAPSE 32 mm.   Physiologic and/or trace mitral regurgitation is present.   No hemodynamically significant aortic stenosis is present.   Mild tricuspid regurgitation.  RVSP is 69 mmHg. Pulmonary hypertension is moderate .   No evidence for hemodynamically significant pericardial effusion.   Compared to prior echo of 2015, changes noted. Now, there is moderate pulmonary hypertension with moderately dilated RV.       Intake/Output Summary (Last 24 hours) at 5/5/2021 1723  Last data filed at 5/5/2021 1424  Gross per 24 hour   Intake 240 ml   Output 1350 ml   Net -1110 ml       Labs:   CBC: No results for input(s): WBC, HGB, HCT, PLT in the last 72 hours. BMP:   Recent Labs     05/04/21  0630      K 4.2   CO2 31*   BUN 37*   CREATININE 1.0   LABGLOM 55   CALCIUM 9.5     Mag: No results for input(s): MG in the last 72 hours. Phos: No results for input(s): PHOS in the last 72 hours. TSH: No results for input(s): TSH in the last 72 hours. HgA1c:     BNP: No results for input(s): BNP in the last 72 hours.   PT/INR: No results for input(s): PROTIME, INR in the last 72 hours. APTT:No results for input(s): APTT in the last 72 hours. CARDIAC ENZYMES:No results for input(s): CKTOTAL, CKMB, CKMBINDEX, TROPONINI in the last 72 hours. FASTING LIPID PANEL:  Lab Results   Component Value Date    CHOL 355 2021    HDL 52 2021    LDLCALC 243 2021    TRIG 298 2021     LIVER PROFILE:No results for input(s): AST, ALT, LABALBU in the last 72 hours. Current Inpatient Medications:   bumetanide  2 mg Oral Daily    lidocaine PF  5 mL Intradermal Once    sodium chloride flush  5-40 mL Intravenous 2 times per day    heparin flush  1 mL Intravenous 2 times per day    metoprolol succinate  100 mg Oral BID    ertapenem (INVanz) IVPB  1,000 mg Intravenous Q24H    insulin lispro  8 Units Subcutaneous TID WC    ezetimibe  10 mg Oral Nightly    insulin lispro  0-18 Units Subcutaneous TID WC    insulin lispro  0-9 Units Subcutaneous Nightly    apixaban  5 mg Oral BID    dilTIAZem  240 mg Oral BID    escitalopram  20 mg Oral Daily    gabapentin  300 mg Oral TID    insulin glargine  40 Units Subcutaneous BID    pantoprazole  40 mg Oral QAM AC    miconazole   Topical BID    montelukast  10 mg Oral Nightly    traZODone  50 mg Oral Nightly       IV Infusions (if any):   sodium chloride      dextrose           PHYSICAL EXAM:     CONSTITUTIONAL:   BP (!) 143/55   Pulse 97   Temp 98.7 °F (37.1 °C) (Temporal)   Resp 20   Ht 5' 3\" (1.6 m)   Wt 272 lb (123.4 kg)   LMP  (LMP Unknown)   SpO2 99%   BMI 48.18 kg/m²   Pulse  Av.5  Min: 80  Max: 97  Systolic (03SEC), MVD:116 , Min:106 , NTN:763    Diastolic (81QNX), QSK:69, Min:55, Max:61    In general, this is a well developed, well nourished who appears stated age. awake, alert, no apparent distress  HEENT: eyes -conjunctivae pink,  Throat - Oral mucosa moist.   Neck-  no stridor,  no carotid bruit no jugular venous distention   RESPIRATORY:  No accessory muscle use.    Lung auscultation - few rhonchi  CARDIOVASCULAR:     Heart Palpation - no palpable thrills   Heart Ausculation - Irregular rate and rhythm, 2/6 systolic murmur, No s3 or rub.   +lower extremity edema, no varicosities. Distal pulses palpable, no clubbing or cyanosis   ABDOMEN: Soft,. Bowel sounds present. MS: n/a. : Deferred  Rectal Exam: Deferred  SKIN: warm and dry   NEURO / PSYCH: oriented to person, place          ASSESSMENT / PLAN:  1. Paroxysmal Atrial fibrillation with RVR - In the setting of bacteremia, YEIMI, anemia, probable SAM (untreated), Mildly dilated left atrium on TTE 4/2021  - Rates controlled, Continue Eliquis OAC     2. Bacteremia --> E coli UTI , sepsis, bacteremia, pyelonephritis --> ID following    3. Chronic HFpEF with dilated RV  - Stable     4. Moderate pulmonary hypertension     5. History of TIA      6. COPD with chronic respiratory failure     7. Probable SAM: she has declined testing     8. Type 2 diabetes, insulin requiring with neuropathy. HGA1C 10.2      9. Hypertension: controlled     10. Severe hyperlipidemia (LDL of 243 in 1/2021) with past statin intolerance      11. Morbid Obesity     11. YEIMI - Resolved, Continue Bumex 2mg PO daily - Monitor renal Fn     12. Chronic iron deficiency anemia         Above recommendations discussed with her.       Tentative discharge today or tomorrow      F/u by Dr Abdirashid Avendaño 2 weeks    Electronically signed by Jerica Marroquin MD on 5/5/2021 at 5:23 PM  [facility]) Cardiology

## 2021-05-05 NOTE — DISCHARGE SUMMARY
Physician Discharge Summary     Patient ID:  En Thomas  [id number]  77 y.o.  1955    Admit date: 4/29/2021    Discharge date and time: 5/6/2021    Admission Diagnoses:   Patient Active Problem List   Diagnosis    Uncontrolled diabetes mellitus ([address]    Depression    Hypertension    Hyperlipidemia    Osteoarthritis    PAF (paroxysmal atrial fibrillation) ([address] - currently in SR    Pulmonary hypertension    Chronic sinusitis    Chronic pain    Steatohepatitis    Baker's cyst of knee    Diabetic neuropathy (HCC)    Iron deficiency    LVH (left ventricular hypertrophy)    Chronic anal fissure    Positive hepatitis C antibody test    PFO (patent foramen ovale)    YEIMI (acute kidney injury) ([address]    Chronic diastolic heart failure (HCC)    Physical deconditioning    Lymphedema of both lower extremities    Chronic anemia    Dyspnea on exertion    Chronic deep vein thrombosis (DVT) of distal vein of right lower extremity (HCC)    GI bleed    Anxiety and depression    Chronic anticoagulation    COPD exacerbation (HCC)    Acute on chronic respiratory failure with hypoxia (HCC)    Blood loss anemia    Sepsis ([address]    Cellulitis of left lower extremity    Poorly controlled diabetes mellitus ([address]    Lymphedema    Septicemia ([address]    Acute diastolic congestive heart failure (HCC)    Acute on chronic anemia    Morbid obesity (HCC)    Acute hypoxemic respiratory failure (HCC)    Diastolic CHF, acute on chronic (HCC)    Cellulitis    Stress fracture of right fibula    Atrial fibrillation, currently in sinus rhythm    Gastroesophageal reflux disease without esophagitis    Ulcers of both lower legs (HCC)    Chronic respiratory failure with hypoxia (HCC)    Abscess and cellulitis of gluteal region    CHF (congestive heart failure), NYHA class I, acute on chronic, combined (HCC)    Adrenal mass (HCC)    Hyperglycemia    Atrial fibrillation with RVR ([address]    UTI (urinary tract What reading provider will be dictating this exam?->CRC FINDINGS: There is low lung volumes with cardiomegaly. There is vascular congestion with atelectasis in the lung bases. Small pleural effusions are suspected. There is degenerative changes in the shoulders bilaterally. Mild CHF. Us Retroperitoneal Complete    Result Date: 4/29/2021  EXAMINATION: RETROPERITONEAL ULTRASOUND OF THE KIDNEYS AND URINARY BLADDER 4/29/2021 COMPARISON: CT abdomen and pelvis, 04/29/2021. HISTORY: ORDERING SYSTEM PROVIDED HISTORY: mervat TECHNOLOGIST PROVIDED HISTORY: Reason for exam:->mervat What reading provider will be dictating this exam?->CRC FINDINGS: Kidneys: The right kidney measures 11.3 cm in length and the left kidney measures 13.5 cm in length. Along the superior of the right kidney, there is a 4.0 x 4.2 x 4.1 cm rounded adrenal mass, as noted on the previous CT. A 4.0 x 4.0 x 3.7 cm rounded lesion is seen along the upper pole of the left kidney, the significance of which is unclear. No hydronephrosis is seen on either side. Bladder: Decompressed by Hansen catheter and therefore not visualized. 1. Right adrenal mass, which has slowly enlarged since the earliest available CT from 04/25/2009. A lipid poor adenoma is favored. However, sonographic imaging does not offer any additional information. If indicated, MRI may be obtained for further evaluation. 2. Questionable masslike lesion along the upper pole of the left kidney. 3. No hydronephrosis. 4. Bladder not evaluated due to decompression. RECOMMENDATIONS: Consider MRI of the abdomen without and with contrast to evaluate the right adrenal lesion and the questionable left renal mass. Discharge Exam:    HEENT: NCAT,  PERRLA, No JVD  Heart: Irregular rate controlled, no murmurs, gallops, or rubs.   Lungs:  CTA bilaterally, no wheeze, rales or rhonchi  Abd: bowel sounds present, nontender, nondistended, no masses  Extrem:  No clubbing, cyanosis, or edema-bilateral lower extremity ichthyosis  Disposition: home     Patient Condition at Discharge: Stable    Patient Instructions:      Medication List      START taking these medications    ertapenem  infusion  Commonly known as: INVANZ  Infuse 1,000 mg intravenously every 24 hours for 10 days For 10 days        CHANGE how you take these medications    * HumaLOG KwikPen 100 UNIT/ML Sopn  Generic drug: insulin lispro (1 Unit Dial)  What changed: Another medication with the same name was added. Make sure you understand how and when to take each. * insulin lispro 100 UNIT/ML injection vial  Commonly known as: HUMALOG  Inject 8 Units into the skin 3 times daily (with meals)  What changed: You were already taking a medication with the same name, and this prescription was added. Make sure you understand how and when to take each. metoprolol succinate 100 MG extended release tablet  Commonly known as: TOPROL XL  Take 1 tablet by mouth 2 times daily  What changed: when to take this         * This list has 2 medication(s) that are the same as other medications prescribed for you. Read the directions carefully, and ask your doctor or other care provider to review them with you.             CONTINUE taking these medications    * acetaminophen 500 MG tablet  Commonly known as: TYLENOL     * acetaminophen 325 MG tablet  Commonly known as: TYLENOL     apixaban 5 MG Tabs tablet  Commonly known as: ELIQUIS     Bumex 2 MG tablet  Generic drug: bumetanide     calcium carbonate 500 MG chewable tablet  Commonly known as: TUMS  Take 1 tablet by mouth 3 times daily as needed for Heartburn     escitalopram 20 MG tablet  Commonly known as: LEXAPRO  Take 1 tablet by mouth daily     ferrous sulfate 325 (65 Fe) MG tablet  Commonly known as: IRON 325  Take 1 tablet by mouth daily (with breakfast)     gabapentin 400 MG capsule  Commonly known as: NEURONTIN     Lantus SoloStar 100 UNIT/ML injection pen  Generic drug: insulin glargine * miconazole 2 % powder  Commonly known as: MICOTIN     * miconazole nitrate 2 % Oint  Apply topically 3 times daily as needed (after toileting)     mineral oil-hydrophilic petrolatum ointment     omega-3 acid ethyl esters 1 g capsule  Commonly known as: LOVAZA  Take 2 capsules by mouth 2 times daily     Prevacid 30 MG delayed release capsule  Generic drug: lansoprazole     promethazine 25 MG tablet  Commonly known as: PHENERGAN     spironolactone 25 MG tablet  Commonly known as: ALDACTONE  Take 1 tablet by mouth daily     terbinafine 250 MG tablet  Commonly known as: LAMISIL  Take 1 tablet by mouth daily     traZODone 50 MG tablet  Commonly known as: DESYREL     vitamin D 1.25 MG (68081 UT) Caps capsule  Commonly known as: ERGOCALCIFEROL         * This list has 4 medication(s) that are the same as other medications prescribed for you. Read the directions carefully, and ask your doctor or other care provider to review them with you. STOP taking these medications    dilTIAZem 240 MG extended release capsule  Commonly known as: CARDIZEM CD     ipratropium 0.02 % nebulizer solution  Commonly known as: ATROVENT     ipratropium-albuterol 0.5-2.5 (3) MG/3ML Soln nebulizer solution  Commonly known as: DUONEB     montelukast 10 MG tablet  Commonly known as: SINGULAIR     Ventolin  (90 Base) MCG/ACT inhaler  Generic drug: albuterol sulfate HFA           Where to Get Your Medications      These medications were sent to [facility] [address] [address]    Phone: [phone]   · insulin lispro 100 UNIT/ML injection vial  · metoprolol succinate 100 MG extended release tablet     You can get these medications from any pharmacy    Bring a paper prescription for each of these medications  · ertapenem  infusion       Activity: activity as tolerated  Diet: diabetic diet    Pt has been advised to:     Follow-up with

## 2021-05-05 NOTE — PROGRESS NOTES
catheter Maximal Assist    Bed Mobility  Rolling: Max A  Supine to sit: Maximal Assist x2  Sit to supine: Maximal Assist x2     Partial d/t pt c/o dizziness/SOB maxA  EOB<>Supine Supine to sit: Minimal Assist   Sit to supine: Minimal Assist    Functional Transfers NT modA  Sit to Stand  Stand to Sit    Stand pivot Transfer Chair<>EOB with HHA Moderate Assist    Functional Mobility NT  . modA  Pt took of short steps during SPT, poor safety/balance Moderate Assist w/ ww   Balance Sitting:     Static:  NT    Dynamic:NT  Standing: NT    Static: NT    Dynamic: NT Sitting Supported:  SBA    Standing:  modA     Activity Tolerance Poor+ (limited d/t pain and SOB--pt attempted to sit EOB,unable d/t pain and SOB, assessed O2 in bed s/p transfer from EOB. O2=81% on 3L, educated pt on PLB and increased O2 to 5L. O2 recovered to 92% in 1-2 minutes. RN aware. Fair- Fair+   Visual/  Perceptual Glasses: readers          Safety    fair-         Comments: Upon arrival pt seated upright in chair, agreeable to therapy. Pt completed of bed mobility, functional mobility of short steps, transfers, ADL tasks and UB exercises this session. Pt educated on importance of therapy and OOB activity, safety and hand placement with transfers, safety with mobility, importance of ROM completing of 10reps in all planes, focusing on increasing of ROM and prevention of contractures, encouraging pt to complete daily. At end of session, pt supine in bed, all tubes and lines intact, call light within reach. · Pt has made fair progress towards set goals.      · Continue with current plan of care focusing on increasing of independency with transfers and ADL tasks    Treatment Time In: 1:10pm            Treatment Time Out: 1:35pm           Treatment Charges: Mins Units   Ther Ex  04781     Manual Therapy 07805     Thera Activities 73799 15 1   ADL/Home Mgt 79405 10 1   Neuro Re-ed [address] [facility] manage/training  42352 Non-Billable Time     Total Timed Treatment 25 2     Medina Reyes PARIKH/L 14174

## 2021-05-05 NOTE — PROGRESS NOTES
Department of Internal Medicine  Infectious Diseases   Progress Note      C/C : ESBL E coli sepsis, bacteremia     Denies fever or chills  Reports shortness of breath   Leg swelling   Afebrile     Current Facility-Administered Medications   Medication Dose Route Frequency Provider Last Rate Last Admin    bumetanide (BUMEX) tablet 2 mg  2 mg Oral Daily Nessa Navraro MD   2 mg at 05/05/21 0823    lidocaine PF 1 % injection 5 mL  5 mL Intradermal Once Nasrin Ho MD        sodium chloride flush 0.9 % injection 5-40 mL  5-40 mL Intravenous 2 times per day Avani Philippe MD   10 mL at 05/05/21 1122    sodium chloride flush 0.9 % injection 5-40 mL  5-40 mL Intravenous PRN Marquis P MD Jose Martin        0.9 % sodium chloride infusion  25 mL Intravenous PRN Marquis Philippe MD        heparin flush 100 UNIT/ML injection 100 Units  1 mL Intravenous 2 times per day Nasrin Ho MD   100 Units at 05/05/21 1123    heparin flush 100 UNIT/ML injection 100 Units  1 mL Intracatheter PRN Marquis Philippe MD        LORazepam (ATIVAN) injection 0.5 mg  0.5 mg Intravenous Q6H PRN Landon Garza MD   0.5 mg at 05/04/21 2002    metoprolol succinate (TOPROL XL) extended release tablet 100 mg  100 mg Oral BID Emerita Magdaleno MD   100 mg at 05/05/21 0824    ertapenem (INVanz) 1000 mg IVPB minibag  1,000 mg Intravenous Q24H Marquis Philippe MD   Stopped at 05/05/21 1251    insulin lispro (HUMALOG) injection vial 8 Units  8 Units Subcutaneous TID MACRINA Garza MD   8 Units at 05/05/21 1202    ezetimibe (ZETIA) tablet 10 mg  10 mg Oral Nightly Olden Alert, APRN - CNS   10 mg at 05/04/21 2001    insulin lispro (HUMALOG) injection vial 0-18 Units  0-18 Units Subcutaneous TID MACRINA Garza MD   6 Units at 05/05/21 1201    insulin lispro (HUMALOG) injection vial 0-9 Units  0-9 Units Subcutaneous Nightly Landon Garza MD   6 Units at 05/04/21 2006    white petrolatum ointment   Topical BID PRN Landon Garza MD        albuterol (PROVENTIL) nebulizer solution 2.5 mg  2.5 mg Nebulization Q4H PRN Antonio Epstein MD   2.5 mg at 05/03/21 1537    apixaban (ELIQUIS) tablet 5 mg  5 mg Oral BID Antonio Epstein MD   5 mg at 05/05/21 4991    calcium carbonate (TUMS) chewable tablet 500 mg  500 mg Oral TID PRN Antonio Epstein MD        dilTIAZem (CARDIZEM CD) extended release capsule 240 mg  240 mg Oral BID Antonio Epstein MD   240 mg at 05/05/21 0824    escitalopram (LEXAPRO) tablet 20 mg  20 mg Oral Daily Antonio Epstein MD   20 mg at 05/05/21 1542    gabapentin (NEURONTIN) capsule 300 mg  300 mg Oral TID Antonio Epstein MD   300 mg at 05/05/21 1323    insulin glargine (LANTUS) injection vial 40 Units  40 Units Subcutaneous BID Antonio Epstein MD   40 Units at 05/05/21 0824    pantoprazole (PROTONIX) tablet 40 mg  40 mg Oral QAM AC Antonio Epstein MD   40 mg at 05/05/21 0537    miconazole (MICOTIN) 2 % powder   Topical BID Antonio Epstein MD   Given at 05/05/21 1127    montelukast (SINGULAIR) tablet 10 mg  10 mg Oral Nightly Antonio Epstein MD   10 mg at 05/04/21 2001    traZODone (DESYREL) tablet 50 mg  50 mg Oral Nightly Antonio Epstein MD   50 mg at 05/04/21 2001    glucose (GLUTOSE) 40 % oral gel 15 g  15 g Oral PRN Antonio Epstein MD        dextrose 50 % IV solution  12.5 g Intravenous PRN Antonio Epstein MD        glucagon (rDNA) injection 1 mg  1 mg Intramuscular PRN Antonio Epstein MD        dextrose 5 % solution  100 mL/hr Intravenous PRN Antonio Epstein MD        acetaminophen (TYLENOL) tablet 650 mg  650 mg Oral Q6H PRN Antonio Epstein MD   650 mg at 05/05/21 0415    Or    acetaminophen (TYLENOL) suppository 650 mg  650 mg Rectal Q6H PRN Antoino Epstein MD        magnesium hydroxide (MILK OF MAGNESIA) 400 MG/5ML suspension 30 mL  30 mL Oral Daily PRN Antonio Epstein MD   30 mL at 05/01/21 0551    promethazine (PHENERGAN) tablet 12.5 mg  12.5 mg Oral Q6H PRN Joanna Hankins MD   12.5 mg at 05/02/21 4612    Or    ondansetron (ZOFRAN) injection 4 mg  4 mg Intravenous Q6H PRN Joanna Hankins MD             REVIEW OF SYSTEMS:    CONSTITUTIONAL: denies fever or chills   HEENT: denies blurring of vision or double vision, denies hearing problem  RESPIRATORY:  Shortness of breath  CARDIOVASCULAR:  Denies palpitation  GASTROINTESTINAL:  Denies abdomen pain, diarrhea or constipation. GENITOURINARY:  Denies burning urination or frequency of urination  INTEGUMENT: denies wound , rash  HEMATOLOGIC/LYMPHATIC:  Denies lymph node swelling, gum bleeding or easy bruising. MUSCULOSKELETAL:Chronic leg swelling , denies pain   NEUROLOGICAL:  Denies light headed, dizziness, loss of consciousness      PHYSICAL EXAM:      Vitals:     BP (!) 143/55   Pulse 97   Temp 98.7 °F (37.1 °C) (Temporal)   Resp 20   Ht 5' 3\" (1.6 m)   Wt 272 lb (123.4 kg)   LMP  (LMP Unknown)   SpO2 99%   BMI 48.18 kg/m²     General Appearance:    Awake, alert , no acute distress  Morbidly obese . Head:    Normocephalic, atraumatic   Eyes:    No pallor, no icterus,   Ears:    No obvious deformity or drainage.    Nose:   No nasal drainage   Throat:   Mucosa moist, no oral thrush   Neck:   Supple, no lymphadenopathy   Back:     No CVA tenderness   Lungs:     Clear    Heart:    Irregular    Abdomen:     Soft, non-tender, bowel sounds present    Extremities:   Bilateral chronic lymphedema, non tender   Right arm midline    Pulses:   Dorsalis pedis not  palpable    Skin:   no rashes or lesions     CBC with Differential:      Lab Results   Component Value Date    WBC 8.4 04/30/2021    RBC 3.79 04/30/2021    HGB 9.0 04/30/2021    HCT 29.9 04/30/2021    PLT 91 04/30/2021    MCV 78.9 04/30/2021    MCH 23.7 04/30/2021    MCHC 30.1 04/30/2021    RDW 15.6 04/30/2021    NRBC 0.0 12/04/2018    SEGSPCT 85 01/29/2014    METASPCT 3.5 12/04/2018    LYMPHOPCT 7.4 04/29/2021    MONOPCT 5.5 04/29/2021    MYELOPCT 2.6 12/04/2018    BASOPCT 0.2 04/29/2021    MONOSABS 0.51 04/29/2021    LYMPHSABS 0.69 04/29/2021    EOSABS 0.02 04/29/2021    BASOSABS 0.02 04/29/2021       CMP     Lab Results   Component Value Date     05/04/2021    K 4.2 05/04/2021    K 3.9 04/13/2021    CL 98 05/04/2021    CO2 31 05/04/2021    BUN 37 05/04/2021    CREATININE 1.0 05/04/2021    GFRAA >60 05/04/2021    LABGLOM 55 05/04/2021    GLUCOSE 126 05/04/2021    GLUCOSE 181 12/16/2011    PROT 6.5 04/29/2021    LABALBU 3.2 04/29/2021    LABALBU 4.5 11/02/2011    CALCIUM 9.5 05/04/2021    BILITOT 0.4 04/29/2021    ALKPHOS 76 04/29/2021    AST 19 04/29/2021    ALT 10 04/29/2021         Hepatic Function Panel:    Lab Results   Component Value Date    ALKPHOS 76 04/29/2021    ALT 10 04/29/2021    AST 19 04/29/2021    PROT 6.5 04/29/2021    BILITOT 0.4 04/29/2021    BILIDIR <0.2 12/26/2019    IBILI see below 12/26/2019    LABALBU 3.2 04/29/2021    LABALBU 4.5 11/02/2011       PT/INR:    Lab Results   Component Value Date    PROTIME 19.5 04/29/2021    INR 1.8 04/29/2021       TSH:    Lab Results   Component Value Date    TSH 0.575 01/15/2021       U/A:    Lab Results   Component Value Date    NITRITE negative 07/26/2018    COLORU Yellow 04/29/2021    PHUR 5.0 04/29/2021    WBCUA 5-10 04/29/2021    RBCUA 10-20 04/29/2021    YEAST Present 05/28/2020    BACTERIA MANY 04/29/2021    CLARITYU CLOUDY 04/29/2021    SPECGRAV >=1.030 04/29/2021    LEUKOCYTESUR MODERATE 04/29/2021    UROBILINOGEN 0.2 04/29/2021    BILIRUBINUR SMALL 04/29/2021    BILIRUBINUR negative 07/26/2018    BLOODU LARGE 04/29/2021    GLUCOSEU Negative 04/29/2021    AMORPHOUS FEW 08/14/2020       ABG:  No results found for: PYP1AQD, BEART, Q0IENHWH, PHART, THGBART, FXC5JKK, PO2ART, EON9GYQ    MICROBIOLOGY:    Blood culture -      Susceptibility    Escherichia coli (1)    Antibiotic Interpretation MALKA Status    ampicillin Resistant >=^32 mcg/mL     ceFAZolin Resistant >=^64 mcg/mL     cefTRIAXone Resistant ^32 mcg/mL     Confirmatory Extended Spectrum Beta-Lactamase Positive Pos mcg/mL     ertapenem Sensitive <=^0.12 mcg/mL     gentamicin Sensitive <=^1 mcg/mL     levofloxacin Resistant >=^8 mcg/mL     trimethoprim-sulfamethoxazole Resistant >=^320 mcg/mL     Lab and Collection          COVID 19 negative         Urine Culture - pending       Radiology :    Chest X ray- mild congestion     CT scan of abdomen and pelvis -  Impression:        Right adrenal mass measuring approximately 4.3 cm in diameter, increased in   the interval since the prior examination.  Indeterminate lesion, malignancy   must be suspected and cannot be excluded.  Surgical consultation recommended. Consider biochemical evaluation for functional status and pheochromocytoma   prior to resection. Splenomegaly. Atelectatic changes in the right lung base with no airspace consolidation or   pleural effusions. Perinephric stranding bilaterally, left greater than right which may be   chronic.  Renal inflammatory process cannot be excluded.  No urolithiasis or   obstructive uropathy. IMPRESSION:     1. ESBL E coli UTI , sepsis, bacteremia, pyelonephritis   2. Lactic acidosis   3. Morbid obesity, chronic lymphedema   4.  Resp failure, CHF,COPD       RECOMMENDATIONS:      1. IV invanz 1 gram IV q 24 hrs  X 9 days   Awaiting pre cert

## 2021-05-06 VITALS
TEMPERATURE: 96.8 F | HEIGHT: 63 IN | HEART RATE: 84 BPM | DIASTOLIC BLOOD PRESSURE: 58 MMHG | SYSTOLIC BLOOD PRESSURE: 127 MMHG | OXYGEN SATURATION: 99 % | RESPIRATION RATE: 18 BRPM | BODY MASS INDEX: 48.2 KG/M2 | WEIGHT: 272 LBS

## 2021-05-06 LAB
METER GLUCOSE: 153 MG/DL (ref 74–99)
METER GLUCOSE: 186 MG/DL (ref 74–99)

## 2021-05-06 PROCEDURE — 6370000000 HC RX 637 (ALT 250 FOR IP): Performed by: INTERNAL MEDICINE

## 2021-05-06 PROCEDURE — 82962 GLUCOSE BLOOD TEST: CPT

## 2021-05-06 PROCEDURE — 6360000002 HC RX W HCPCS: Performed by: INTERNAL MEDICINE

## 2021-05-06 PROCEDURE — 2580000003 HC RX 258: Performed by: INTERNAL MEDICINE

## 2021-05-06 PROCEDURE — 2700000000 HC OXYGEN THERAPY PER DAY

## 2021-05-06 PROCEDURE — 94640 AIRWAY INHALATION TREATMENT: CPT

## 2021-05-06 RX ORDER — DILTIAZEM HYDROCHLORIDE 240 MG/1
240 CAPSULE, COATED, EXTENDED RELEASE ORAL 2 TIMES DAILY
Qty: 30 CAPSULE | Refills: 3 | Status: SHIPPED | OUTPATIENT
Start: 2021-05-06 | End: 2022-03-08

## 2021-05-06 RX ORDER — EZETIMIBE 10 MG/1
10 TABLET ORAL NIGHTLY
Qty: 30 TABLET | Refills: 3 | Status: SHIPPED | OUTPATIENT
Start: 2021-05-06

## 2021-05-06 RX ADMIN — ACETAMINOPHEN 650 MG: 325 TABLET ORAL at 13:16

## 2021-05-06 RX ADMIN — INSULIN LISPRO 8 UNITS: 100 INJECTION, SOLUTION INTRAVENOUS; SUBCUTANEOUS at 11:30

## 2021-05-06 RX ADMIN — PANTOPRAZOLE SODIUM 40 MG: 40 TABLET, DELAYED RELEASE ORAL at 06:41

## 2021-05-06 RX ADMIN — APIXABAN 5 MG: 5 TABLET, FILM COATED ORAL at 09:26

## 2021-05-06 RX ADMIN — ERTAPENEM SODIUM 1000 MG: 1 INJECTION, POWDER, LYOPHILIZED, FOR SOLUTION INTRAMUSCULAR; INTRAVENOUS at 13:03

## 2021-05-06 RX ADMIN — INSULIN GLARGINE 40 UNITS: 100 INJECTION, SOLUTION SUBCUTANEOUS at 09:25

## 2021-05-06 RX ADMIN — ESCITALOPRAM 20 MG: 10 TABLET, FILM COATED ORAL at 09:26

## 2021-05-06 RX ADMIN — DILTIAZEM HYDROCHLORIDE 240 MG: 240 CAPSULE, EXTENDED RELEASE ORAL at 09:26

## 2021-05-06 RX ADMIN — BUMETANIDE 2 MG: 1 TABLET ORAL at 09:27

## 2021-05-06 RX ADMIN — ALBUTEROL SULFATE 2.5 MG: 2.5 SOLUTION RESPIRATORY (INHALATION) at 01:47

## 2021-05-06 RX ADMIN — MICONAZOLE NITRATE: 20.6 POWDER TOPICAL at 09:00

## 2021-05-06 RX ADMIN — INSULIN LISPRO 8 UNITS: 100 INJECTION, SOLUTION INTRAVENOUS; SUBCUTANEOUS at 06:52

## 2021-05-06 RX ADMIN — GABAPENTIN 300 MG: 300 CAPSULE ORAL at 09:26

## 2021-05-06 RX ADMIN — INSULIN LISPRO 3 UNITS: 100 INJECTION, SOLUTION INTRAVENOUS; SUBCUTANEOUS at 06:51

## 2021-05-06 RX ADMIN — ACETAMINOPHEN 650 MG: 325 TABLET ORAL at 02:17

## 2021-05-06 ASSESSMENT — PAIN SCALES - GENERAL: PAINLEVEL_OUTOF10: 7

## 2021-05-06 NOTE — CARE COORDINATION
Spoke with Angelica Carmen from [facility], auth remains pending, I left a detailed message for Savita Davidson at phone [phone] with request for call back and relayed delaying discharge. I was told yesterday when I called Nancy that SNF auth are now taking 72 hours, and [facility] Bonner was working on it pending auth # [id number].

## 2021-05-06 NOTE — PROGRESS NOTES
Subjective: The patient is awake and alert. Up in chair feels well , no more sob       Objective:    /81   Pulse 106   Temp 97.9 °F (36.6 °C) (Temporal)   Resp 18   Ht 5' 3\" (1.6 m)   Wt 272 lb (123.4 kg)   LMP  (LMP Unknown)   SpO2 96%   BMI 48.18 kg/m²     In: 240 [P.O.:240]  Out: 1350   In: 240   Out: 1350 [Urine:1350]    General appearance: NAD, conversant  HEENT: AT/NC, MMM  Neck: FROM, supple  Lungs: Clear to auscultation  CV: irre rate controlled  , no MRGs  Vasc: Radial pulses 2+  Abdomen: Soft, non-tender; no masses or HSM  Extremities: bl legs with lymphedema and icthyosis   Skin: no rash, lesions or ulcers  Psych: Alert and oriented to person, place and time  Neuro: Alert and interactive     No results for input(s): WBC, HGB, HCT, PLT in the last 72 hours. Recent Labs     05/04/21  0630      K 4.2   CL 98   CO2 31*   BUN 37*   CREATININE 1.0   CALCIUM 9.5       Assessment:    Principal Problem:    Infection due to extended-spectrum beta-lactamase-producing Escherichia coli  Active Problems:    Anxiety and depression    Chronic anticoagulation    Hypertension    Hyperlipidemia    PAF (paroxysmal atrial fibrillation) ([facility]) - currently in SR    Pulmonary hypertension    Diabetic neuropathy ([facility])    YEIMI (acute kidney injury) ([facility])    Chronic diastolic heart failure ([facility])    Lymphedema of both lower extremities    Chronic anemia    Gastroesophageal reflux disease without esophagitis    UTI (urinary tract infection)  Resolved Problems:    * No resolved hospital problems.  *      Plan:    Admit to telemetry for evaluation of acute hypoxemic respiratory failure associated with morbid obesity, altered mental status with positive blood cultures with gram-negative rods 3 out of 4 bottles- pyelonephritis    Stop fluids , bumex iv X1 for sob from volume overload   IV cefepime pending final culture results and sensitivities  On Invanz by ID -ESBL E. Coli in blood cultures  Continue BiPAP as at home  Continue nasal cannula as at home     Atrial fibrillation with RVR  Rate controlled , remains afib   Likely secondary to bacteremia/stress state  Resume home medications incl doac - eliquis   Cardizem drip stopped, po metoprolol 100 mg p.o. twice daily- rate controlled   No plans for dccv   Cardiology following       Uncontrolled diabetes mellitus type 2  Secondary to active infection with positive bacteremia  Add 8 units prandial insulin in addition to current home dose glargine plus insulin sliding scale    Am labs     DVT Prophylaxis   PT/OT  Discharge planning - ok for [facility] once pre cert obtained           Shanell Lr MD  8:42 PM  5/5/2021

## 2021-05-06 NOTE — PROGRESS NOTES
Physician Progress Note      PATIENT:               Isabella Benton  CSN #:                  [id number]  :                       1955  ADMIT DATE:       2021 3:53 PM  [address] [place] DATE:  RESPONDING  PROVIDER #:        KATHY Gifford MD          QUERY TEXT:    Pt admitted with Acute hypoxic respiratory failure and pyelonephritis. Noted   documentation of Sepsis on  by ordered ID Consultant. If possible, please   document in progress notes and discharge summary:    The medical record reflects the following:  Risk Factors: positive blood cultures with gram negative rods,pyelonephritis  Clinical Indicators: per ID  E Coli UTI- Sepsis, bacteremia, pyelonephritis; Labs on admit WBC 9.3 lactic 2.8 VS on admit 99.1, 130, 16, 155/79, 91% 3LNC    VS  96.6, 91, 18, 133/73,  96% 6LNC  Treatment: ID Consult, IV antibiotics, Midline    Thank you  Caitlin ORTEZN, RN, CCDS  Clinical Documentation Improvement  Options provided:  -- Sepsis confirmed present on admission  -- Sepsis confirmed not present on admission  -- Sepsis ruled out  -- Other - I will add my own diagnosis  -- Disagree - Not applicable / Not valid  -- Disagree - Clinically unable to determine / Unknown  -- Refer to Clinical Documentation Reviewer    PROVIDER RESPONSE TEXT:    The diagnosis of Sepsis was confirmed as present on admission.     Query created by: Oral Boucher on 7/3/0896 44:02 AM      Electronically signed by:  KATHY Gifford MD 2021 10:55 AM

## 2021-05-06 NOTE — PROGRESS NOTES
[facility]   Department of Pharmacy   Pharmacist Transition of Care Services         Patient Demographics  Name:  [facility] Record Number:  [id number]  Gender:  female   Age:  77 y.o. YOB: 1955        Pharmacist Review and Summary of Medications     Date of last reviewed/update: 5/6/21     Category Comments   New Medication Started    Ezetimibe 10 mg nightly, ertapenem 1000 mg IV every 24 hours     Change in Outpatient Medication Metoprolol succinate 100 mg twice daily     Discontinued/On hold Outpatient Medication  Albuterol, ipratropium, Duoneb, montelukast     Other          Documentation of Pharmacist Interventions and Follow-up Plan:     The following Pharmacist Transition of Care Services were completed:   [x]  Reviewed and summarized medication changes  []  Entire home medication list was reviewed for accuracy  []  Home medication list was updated or corrected    [x]  Reviewed discharge medication reconciliation  [x]  Discharge medication list was updated or corrected    []  Added information to AVS   []  Patient education was provided on new medications  []  Patient education was provided on medication changes  []  Reviewed the AVS with patient    Additional Interventions:     [x] Other interventions: Clarified diltiazem, ezetimibe and Humalog for discharge        Pharmacist: Jim Collier PharmD, [facility]  Date:  5/6/2021 3:26 PM  Time spent counseling patient face-to-face OR over the phone on medications: 0 minutes

## 2021-05-10 NOTE — DISCHARGE SUMMARY
Hospital Medicine Discharge Summary    Patient ID: Tanja Canchola      Patient's PCP: Lila Metcalf    Admit Date: 4/9/2021     Discharge Date: 4/17/2021    Admitting Physician: Dov Shell MD     Discharge Physician: Beckey Landau, MD     Discharge Diagnoses: Active Hospital Problems    Diagnosis Date Noted    Cellulitis [L03.90] 09/22/2019       The patient was seen and examined on day of discharge and this discharge summary is in conjunction with any daily progress note from day of discharge. Hospital Course:   Patient admitted on 39/2021 59-year-old female patient presents to emergency department for increasing shortness of breath over the last few days. Jyoti Price was admitted with 2 chief problems of acute exacerbation of chronic heart failure and cellulitis of bilateral lower extremities. Cellulitis improved and COPD exacerbation gradually better with steroids. Discharged to SNF on 4/17/21 in stable condition with pcp follow up             Exam:     /72   Pulse 65   Temp 97.7 °F (36.5 °C) (Oral)   Resp 14   Ht 5' 3\" (1.6 m)   Wt 272 lb 14 oz (123.8 kg)   LMP  (LMP Unknown)   SpO2 96%   BMI 48.34 kg/m²     General appearance: No apparent distress, appears stated age and cooperative. HEENT:  Conjunctivae/corneas clear. Neck: Supple. No jugular venous distention. Respiratory: Clear to auscultation bilaterally, normal respiratory effort. Bilateral basal crackles noted. Cardiovascular: Regular rate rhythm, normal S1-S2  Abdomen: Soft, nontender, nondistended  Musculoskeletal: No clubbing, cyanosis, +++ BLE edema is present Brisk capillary refill. Skin:  Changes of bilateral lower extremities noted.   Neurologic: awake, alert and following commands       Consults:     IP CONSULT TO INTERNAL MEDICINE  IP CONSULT TO DIETITIAN  IP CONSULT TO INFECTIOUS DISEASES  PHARMACY TO DOSE VANCOMYCIN  IP CONSULT TO CARDIOLOGY  IP CONSULT TO ENDOCRINOLOGY    Significant Diagnostic Studies: NM BONE SCAN 3 PHASE   Final Result   At the level the coccyx there is no convincing increased uptake. This   is most convincing on the lateral images. Please correlate clinically,   the lesion questioned on the previous CT interpretation may be below   the resolution of bone scan      CT PELVIS WO CONTRAST Additional Contrast? None   Final Result   Nonspecific soft tissue density anterior to the tip of the coccyx with   possible erosive changes involving the anterior cortex of the distal   coccygeal segment. This is best demonstrated on axial image 85. Nuclear   medicine bone scan may be helpful to further characterize if clinically   warranted. XR CHEST PORTABLE   Final Result   Cardiomegaly with pulmonary vascular congestion and developing interstitial   pulmonary edema or pneumonia throughout right lung. Disposition:  SNF     Discharge Instructions/Follow-up:  Keep scheduled follow up appointments. Take medications as prescribed. Code Status:  Prior     Activity: activity as tolerated    Diet: regular diet    Labs:  For convenience and continuity at follow-up the following most recent labs are provided:      CBC:    Lab Results   Component Value Date    WBC 8.4 04/30/2021    HGB 9.0 04/30/2021    HCT 29.9 04/30/2021    PLT 91 04/30/2021       Renal:    Lab Results   Component Value Date     05/04/2021    K 4.2 05/04/2021    K 3.9 04/13/2021    CL 98 05/04/2021    CO2 31 05/04/2021    BUN 37 05/04/2021    CREATININE 1.0 05/04/2021    CALCIUM 9.5 05/04/2021    PHOS 3.2 05/30/2018       Discharge Medications:     Discharge Medication List as of 4/18/2021  1:54 AM           Details   calcium carbonate (TUMS) 500 MG chewable tablet Take 1 tablet by mouth 3 times daily as needed for Heartburn, Disp-30 tablet, R-0Normal      levoFLOXacin (LEVAQUIN) 500 MG tablet Take 1 tablet by mouth daily for 7 days, Disp-7 tablet, R-0Print      doxycycline hyclate (VIBRAMYCIN) 100 MG capsule Take 1 capsule by mouth every 12 hours for 7 days, Disp-14 capsule, R-0Print      terbinafine (LAMISIL) 250 MG tablet Take 1 tablet by mouth daily, Disp-30 tablet, R-0Print      !! miconazole (MICOTIN) 2 % powder Apply topically 2 times daily. Apply light dusting under breast and abdominal fold, Disp-45 g, R-1, NO PRINT       ! ! - Potential duplicate medications found. Please discuss with provider. Details   insulin glargine (LANTUS) 100 UNIT/ML injection vial Inject 40 Units into the skin 2 times daily, Disp-1 vial, R-3Normal      insulin lispro (HUMALOG) 100 UNIT/ML injection vial 35 units plus 0-12 units sliding scale with each meal, Disp-1 vial, R-3NO PRINT              Details   Mineral Oil-Hydrophil Petrolat OINT Apply 30 mLs topically 3 times daily as needed (wounds), Disp-240 g, R-1Normal      escitalopram (LEXAPRO) 20 MG tablet Take 1 tablet by mouth daily, Disp-30 tablet,R-0Print      miconazole nitrate 2 % OINT Apply topically 3 times daily as needed (after toileting), Topical, 3 TIMES DAILY PRN Starting Thu 11/26/2020, Disp-1 Tube,R-0, Print      OXYGEN Inhale 2 L into the lungs continuous, Disp-1 Act,R-0Normal      dilTIAZem (CARDIZEM CD) 240 MG extended release capsule Take 1 capsule by mouth daily, Disp-30 capsule, R-0Print      !! miconazole (MICOTIN) 2 % powder Apply topically 2 times daily. , Disp-45 g,R-1, Print      apixaban (ELIQUIS) 5 MG TABS tablet Take 5 mg by mouth 2 times dailyHistorical Med      bumetanide (BUMEX) 2 MG tablet Take 2 mg by mouth 2 times dailyHistorical Med      gabapentin (NEURONTIN) 400 MG capsule Take 300 mg by mouth 3 times daily. Historical Med      lansoprazole (PREVACID) 30 MG delayed release capsule Take 30 mg by mouth 2 times dailyHistorical Med      traZODone (DESYREL) 50 MG tablet Take 50 mg by mouth nightlyHistorical Med      vitamin D (ERGOCALCIFEROL) 1.25 MG (16603 UT) CAPS capsule Take 50,000 Units by mouth once a week Given MondayHistorical Med

## 2021-05-20 ENCOUNTER — OFFICE VISIT (OUTPATIENT)
Dept: ENDOCRINOLOGY | Age: 66
End: 2021-05-20
Payer: COMMERCIAL

## 2021-05-20 VITALS
BODY MASS INDEX: 48.18 KG/M2 | OXYGEN SATURATION: 96 % | HEART RATE: 78 BPM | RESPIRATION RATE: 20 BRPM | SYSTOLIC BLOOD PRESSURE: 124 MMHG | DIASTOLIC BLOOD PRESSURE: 76 MMHG | HEIGHT: 63 IN

## 2021-05-20 DIAGNOSIS — E27.8 ADRENAL INCIDENTALOMA (HCC): ICD-10-CM

## 2021-05-20 DIAGNOSIS — E11.65 TYPE 2 DIABETES MELLITUS WITH HYPERGLYCEMIA, WITH LONG-TERM CURRENT USE OF INSULIN (HCC): Primary | ICD-10-CM

## 2021-05-20 DIAGNOSIS — E55.9 VITAMIN D DEFICIENCY: ICD-10-CM

## 2021-05-20 DIAGNOSIS — Z79.4 TYPE 2 DIABETES MELLITUS WITH HYPERGLYCEMIA, WITH LONG-TERM CURRENT USE OF INSULIN (HCC): Primary | ICD-10-CM

## 2021-05-20 PROCEDURE — 3046F HEMOGLOBIN A1C LEVEL >9.0%: CPT | Performed by: INTERNAL MEDICINE

## 2021-05-20 PROCEDURE — G8417 CALC BMI ABV UP PARAM F/U: HCPCS | Performed by: INTERNAL MEDICINE

## 2021-05-20 PROCEDURE — 2022F DILAT RTA XM EVC RTNOPTHY: CPT | Performed by: INTERNAL MEDICINE

## 2021-05-20 PROCEDURE — 4040F PNEUMOC VAC/ADMIN/RCVD: CPT | Performed by: INTERNAL MEDICINE

## 2021-05-20 PROCEDURE — G8400 PT W/DXA NO RESULTS DOC: HCPCS | Performed by: INTERNAL MEDICINE

## 2021-05-20 PROCEDURE — 1123F ACP DISCUSS/DSCN MKR DOCD: CPT | Performed by: INTERNAL MEDICINE

## 2021-05-20 PROCEDURE — 1090F PRES/ABSN URINE INCON ASSESS: CPT | Performed by: INTERNAL MEDICINE

## 2021-05-20 PROCEDURE — 3017F COLORECTAL CA SCREEN DOC REV: CPT | Performed by: INTERNAL MEDICINE

## 2021-05-20 PROCEDURE — 1111F DSCHRG MED/CURRENT MED MERGE: CPT | Performed by: INTERNAL MEDICINE

## 2021-05-20 PROCEDURE — 1036F TOBACCO NON-USER: CPT | Performed by: INTERNAL MEDICINE

## 2021-05-20 PROCEDURE — G8427 DOCREV CUR MEDS BY ELIG CLIN: HCPCS | Performed by: INTERNAL MEDICINE

## 2021-05-20 PROCEDURE — 99214 OFFICE O/P EST MOD 30 MIN: CPT | Performed by: INTERNAL MEDICINE

## 2021-05-20 RX ORDER — DEXAMETHASONE 1 MG
1 TABLET ORAL ONCE
Qty: 1 TABLET | Refills: 0 | Status: SHIPPED | OUTPATIENT
Start: 2021-05-20 | End: 2021-05-20

## 2021-05-20 NOTE — PROGRESS NOTES
Her smoking use included cigarettes. She started smoking about 41 years ago. She has a 37.50 pack-year smoking history. She has never used smokeless tobacco.  Alcohol:   reports previous alcohol use. Drugs:   reports previous drug use. Family history:    Family History   Problem Relation Age of Onset    Heart Disease Mother     Diabetes Father     Colon Cancer Father     Other Father         BLINDNESS    Colon Cancer Sister     Other Sister         shingles- has had over 1 year    Diabetes Paternal Aunt     Diabetes Paternal Uncle     Diabetes Paternal Aunt     Diabetes Paternal Uncle     Diabetes Sister        Allergy and drug reactions:    Allergies   Allergen Reactions    Statins Other (See Comments)     Muscle pains       Scheduled Meds  Current Outpatient Medications on File Prior to Visit   Medication Sig Dispense Refill    dilTIAZem (CARDIZEM CD) 240 MG extended release capsule Take 1 capsule by mouth 2 times daily 30 capsule 3    ezetimibe (ZETIA) 10 MG tablet Take 1 tablet by mouth nightly 30 tablet 3    metoprolol succinate (TOPROL XL) 100 MG extended release tablet Take 1 tablet by mouth 2 times daily 30 tablet 3    insulin lispro, 1 Unit Dial, (HUMALOG KWIKPEN) 100 UNIT/ML SOPN Inject 35 Units into the skin 3 times daily *Plus Sliding Scale*      insulin glargine (LANTUS SOLOSTAR) 100 UNIT/ML injection pen Inject 45 Units into the skin 2 times daily 55 units 2 times a day      miconazole (MICOTIN) 2 % powder Apply topically 2 times daily Apply to breasts and abdominal folds      mineral oil-hydrophilic petrolatum (AQUAPHOR) ointment Apply topically 3 times daily as needed for Dry Skin      promethazine (PHENERGAN) 25 MG tablet Take 25 mg by mouth every 4 hours as needed for Nausea      acetaminophen (TYLENOL) 500 MG tablet Take 1,000 mg by mouth every 6 hours as needed for Pain      escitalopram (LEXAPRO) 20 MG tablet Take 1 tablet by mouth daily 30 tablet 0    miconazole nitrate 2 % OINT Apply topically 3 times daily as needed (after toileting) 1 Tube 0    apixaban (ELIQUIS) 5 MG TABS tablet Take 5 mg by mouth 2 times daily      bumetanide (BUMEX) 2 MG tablet Take 2 mg by mouth 2 times daily      gabapentin (NEURONTIN) 400 MG capsule Take 400 mg by mouth 3 times daily.  lansoprazole (PREVACID) 30 MG delayed release capsule Take 30 mg by mouth 2 times daily      traZODone (DESYREL) 50 MG tablet Take 50 mg by mouth nightly      vitamin D (ERGOCALCIFEROL) 1.25 MG (73842 UT) CAPS capsule Take 50,000 Units by mouth once a week Given Monday      omega-3 acid ethyl esters (LOVAZA) 1 g capsule Take 2 capsules by mouth 2 times daily 60 capsule 3    spironolactone (ALDACTONE) 25 MG tablet Take 1 tablet by mouth daily 30 tablet 5    ferrous sulfate (IRON 325) 325 (65 Fe) MG tablet Take 1 tablet by mouth daily (with breakfast) 90 tablet 1    acetaminophen (TYLENOL) 325 MG tablet Take 650 mg by mouth every 6 hours as needed for Pain or Fever        No current facility-administered medications on file prior to visit. Review of Systems  All systems reviewed. All negative except for symptoms mentioned in HPI     OBJECTIVE    /76   Pulse 78   Resp 20   Ht 5' 3\" (1.6 m)   LMP  (LMP Unknown)   SpO2 96%   BMI 48.18 kg/m²   No intake or output data in the 24 hours ending 05/20/21 1330    Physical examination:  General: awake alert, oriented x3  HEENT: normocephalic non traumatic, no exophthalmos   Neck: supple, thyroid tenderness,  Pulm: Clear equal air entry no added sounds  CVS: S1 + S2  Abd: soft lax, no tenderness  Skin: warm, no lesions, no rash. Wound in both legs   Neuro: CN intact, sensation decreased bilateral , muscle power normal  Psych: normal mood, and affect    Review of Laboratory Data:  I personally reviewed the following labs:   No results for input(s): WBC, RBC, HGB, HCT, MCV, MCH, MCHC, RDW, PLT, MPV in the last 72 hours.   No results for input(s): NA, K, CL, CO2, BUN, CREATININE, GLUCOSE, CALCIUM, PROT, LABALBU, BILITOT, ALKPHOS, AST, ALT in the last 72 hours. Beta-Hydroxybutyrate   Date Value Ref Range Status   11/17/2020 1.73 (H) 0.02 - 0.27 mmol/L Final   05/29/2020 0.18 0.02 - 0.27 mmol/L Final   05/28/2020 0.04 0.02 - 0.27 mmol/L Final     Lab Results   Component Value Date    LABA1C 10.9 04/30/2021    LABA1C 10.4 04/11/2021    LABA1C 10.2 01/13/2021     Lab Results   Component Value Date/Time    TSH 0.575 01/15/2021 09:04 AM    T4FREE 1.05 01/15/2021 09:04 AM     Lab Results   Component Value Date    LABA1C 10.9 04/30/2021    GLUCOSE 126 05/04/2021    GLUCOSE 181 12/16/2011    MALBCR <30 05/16/2018    LABMICR <12.0 05/12/2017    LABCREA 261 04/29/2021     Lab Results   Component Value Date    TRIG 298 01/16/2021    HDL 52 01/16/2021    LDLCALC 243 01/16/2021    CHOL 355 01/16/2021       Blood culture   Lab Results   Component Value Date    [facility]  04/29/2021     This organism possesses an Extended Spectrum Beta Lactamase  that is inhibited by Clavulanic Acid. This isolate demonstrated resistance to multiple classes of  antibiotics. Please review results with care and follow  isolation guidelines as indicated. BC 5 Days no growth 04/09/2021       Radiology:  No orders to display     Chris Preston, a 77 y.o.-old female seen today for inpatient diabetes management     Diabetes Mellitus type 2  · Uncontrolled, A1c 15.7%  · Will change insulin regimen to lantus 58 units BID, Humalog 35 units with meals, High dose sliding scale   · The patient was advised to check blood sugars 4 times a day before meals and at bedtime and fax the results to our office in a week. · Discussed with patient A1c and blood sugar goals   · Patient will need routine diabetes maintenance and prevention  · Diabetes labs before next visit     Obesity   Discussed lifestyle changes including diet and exercise with patient in depth.  Also discussed with patient cardiovascular risk associated with obesity    Rt adrenal mass  · CT adrenal  5/30/2020 --> high pre-contrast Hounsfield units (34) with late phase washout 49 Hounsfield units makings lesion undetermined. This lesion has increased size when comparison is made with previous study of April 2019. Increased size of the right adrenal gland lesion up to 3.4 x 3 cm,  · Previous endocrine work up 5/2020 showed normal plasma metanephrines but indeterminate 1 mg DST   · With obesity, high BP and diabetes will order plasma metanephrine and 1 mg DST and proceed with biopsy to Rt side adrenal lesion   · Pt on Spironolactone and for this reason we can't do Renin/Patel     The above issues were reviewed with the patient who understood and agreed with the plan. Thank you for allowing us to participate in the care of this patient. Please do not hesitate to contact us with any additional questions. Arron Rios MD  Endocrinologist, [facility] Diabetes Care and Endocrinology   [address] [facility] [address]   Phone: [phone]  Fax: [phone]  -------------------------  An electronic signature was used to authenticate this note.  Marcellus Chacko MD on 5/20/2021 at 1:30 PM

## 2021-05-20 NOTE — PATIENT INSTRUCTIONS
Recommendations for today's visit  · Change Lantus to 58 units twice a day   · Take Humalog 35 units with meals + sliding scale   Blood sugar 150-200: add 2 Units of Humalog  Blood sugar 201-250 : Add 4 Units of Humalog  Blood sugar 251 - 300: Add 6 Units of Humalog  Blood sugar 301 - 350 : Add 8 Units of Humalog  Blood sugar >350 : Add 10 Units of Humalog    · Ar bedtime, please use the following sliding scale   If blood sugars are 200-250 - take 2 units of Humalog   If blood sugars are 251-300 - take 3 units of Humalog   If blood sugars are 301-350 -take 4 units of Humalog   If blood sugars are above 350 - take 5 units of Humalog    · Check Blood sugar 4 times/day before meals and at bedtime and send us sugar log in a week    I you have any questions please call Dr. Victor Manuel Ferris office     Saad Thomas MD  Endocrinologist, [facility])   [address] [facility], [address] [place] [address],[facility] [id number]   Phone: [phone]  Fax: [phone]  Email: [email or web address]

## 2021-05-29 PROBLEM — N39.0 UTI (URINARY TRACT INFECTION): Status: RESOLVED | Noted: 2021-04-29 | Resolved: 2021-05-29

## 2021-06-14 ENCOUNTER — OFFICE VISIT (OUTPATIENT)
Dept: CARDIOLOGY CLINIC | Age: 66
End: 2021-06-14
Payer: COMMERCIAL

## 2021-06-14 VITALS
BODY MASS INDEX: 48.73 KG/M2 | DIASTOLIC BLOOD PRESSURE: 68 MMHG | RESPIRATION RATE: 14 BRPM | WEIGHT: 275 LBS | SYSTOLIC BLOOD PRESSURE: 108 MMHG | HEIGHT: 63 IN | HEART RATE: 75 BPM

## 2021-06-14 DIAGNOSIS — I10 ESSENTIAL HYPERTENSION: Primary | ICD-10-CM

## 2021-06-14 PROCEDURE — G8427 DOCREV CUR MEDS BY ELIG CLIN: HCPCS | Performed by: INTERNAL MEDICINE

## 2021-06-14 PROCEDURE — 1036F TOBACCO NON-USER: CPT | Performed by: INTERNAL MEDICINE

## 2021-06-14 PROCEDURE — G8400 PT W/DXA NO RESULTS DOC: HCPCS | Performed by: INTERNAL MEDICINE

## 2021-06-14 PROCEDURE — G8417 CALC BMI ABV UP PARAM F/U: HCPCS | Performed by: INTERNAL MEDICINE

## 2021-06-14 PROCEDURE — 4040F PNEUMOC VAC/ADMIN/RCVD: CPT | Performed by: INTERNAL MEDICINE

## 2021-06-14 PROCEDURE — 3017F COLORECTAL CA SCREEN DOC REV: CPT | Performed by: INTERNAL MEDICINE

## 2021-06-14 PROCEDURE — 1123F ACP DISCUSS/DSCN MKR DOCD: CPT | Performed by: INTERNAL MEDICINE

## 2021-06-14 PROCEDURE — 99214 OFFICE O/P EST MOD 30 MIN: CPT | Performed by: INTERNAL MEDICINE

## 2021-06-14 PROCEDURE — 1090F PRES/ABSN URINE INCON ASSESS: CPT | Performed by: INTERNAL MEDICINE

## 2021-06-14 PROCEDURE — 93000 ELECTROCARDIOGRAM COMPLETE: CPT | Performed by: INTERNAL MEDICINE

## 2021-06-14 RX ORDER — METOLAZONE 5 MG/1
5 TABLET ORAL ONCE
Qty: 1 TABLET | Refills: 0 | Status: SHIPPED
Start: 2021-06-15 | End: 2021-07-05

## 2021-06-14 RX ORDER — METOLAZONE 5 MG/1
5 TABLET ORAL ONCE
Qty: 1 TABLET | Refills: 0 | Status: SHIPPED
Start: 2021-06-17 | End: 2021-07-05

## 2021-06-14 NOTE — PROGRESS NOTES
Vinod Bachelor  1955  Date of Service: 6/14/2021    Patient Active Problem List    Diagnosis Date Noted    Acute on chronic anemia 12/16/2018     Priority: High    Anxiety and depression 11/06/2018     Priority: Low    Chronic anticoagulation 11/06/2018     Priority: Low    Infection due to extended-spectrum beta-lactamase-producing Escherichia coli 05/03/2021    Atrial fibrillation with RVR ([address] 01/13/2021    Hyperglycemia 11/17/2020    Adrenal mass ([address]     CHF (congestive heart failure), NYHA class I, acute on chronic, combined ([address] 08/15/2020    Abscess and cellulitis of gluteal region 05/29/2020    Chronic respiratory failure with hypoxia ([address] 05/26/2020    Ulcers of both lower legs ([address] 01/23/2020    Stress fracture of right fibula 12/24/2019    Atrial fibrillation, currently in sinus rhythm 12/24/2019    Gastroesophageal reflux disease without esophagitis 12/24/2019    Cellulitis 51/86/0262    Diastolic CHF, acute on chronic (HCC)     Acute hypoxemic respiratory failure (HCC)     Morbid obesity (HCC)     Acute diastolic congestive heart failure ([address] 12/15/2018    Septicemia ([address]     Cellulitis of left lower extremity     Poorly controlled diabetes mellitus ([address]     Lymphedema     Sepsis ([address] 11/27/2018    Blood loss anemia     PAF (paroxysmal atrial fibrillation) ([address] - currently in SR 11/06/2018     Overview Note:     May 2004, spontaneous conversion to sinus rhythm and sinus tachycardia on Cardizem. Not on coumadin d/t prior, bleeding, GIB, & anemia.       GI bleed 11/06/2018    COPD exacerbation ([address] 11/06/2018    Acute on chronic respiratory failure with hypoxia (HCC) 11/06/2018    Chronic deep vein thrombosis (DVT) of distal vein of right lower extremity ([address] 08/01/2018    Dyspnea on exertion     Lymphedema of both lower extremities 04/13/2018    Chronic anemia 04/13/2018    Physical deconditioning 01/19/2018    Chronic diastolic heart failure ([facility] 01/15/2018    YEIMI (acute kidney injury) ([facility] 10/06/2017    PFO (patent foramen ovale) 2015    Positive hepatitis C antibody test 2015    Chronic anal fissure 2013    LVH (left ventricular hypertrophy)      Overview Note:     moderate      Iron deficiency 03/15/2013    Diabetic neuropathy ([facility] 2013    Baker's cyst of knee 2013    Steatohepatitis 2012    Chronic pain 2012    Chronic sinusitis 2011    Pulmonary hypertension 10/13/2011     Overview Note:     Mild pulmonary hypertension with a pulmonary artery systolic pressure of 67-90 mmHg on echo 04. No cardiac etiology seen.  Hyperlipidemia 2011    Osteoarthritis 2011    Hypertension 04/15/2011    Depression 2011    Uncontrolled diabetes mellitus ([facility] 2010       Social History     Socioeconomic History    Marital status:      Spouse name: None    Number of children: 3    Years of education: 9    Highest education level: 9th grade   Occupational History    Occupation: [employer]   Tobacco Use    Smoking status: Former Smoker     Packs/day: 1.50     Years: 25.00     Pack years: 37.50     Types: Cigarettes     Start date: 1979     Quit date: 2004     Years since quittin.4    Smokeless tobacco: Never Used    Tobacco comment: Stopped smoking 16 years ago   Vaping Use    Vaping Use: Never used   Substance and Sexual Activity    Alcohol use: Not Currently     Comment: 1 can coke daily    Drug use: Not Currently    Sexual activity: Not Currently     Partners: Male   Other Topics Concern    None   Social History Narrative    Denies caffeine. Grandson shops for her and helps around the house    Patient unable to exercise d/t mobility status     Patient lives with her  who has limited speech ability d/t hx of CVA.  He is able to assist patient with IADLS     Social Determinants of Health     Financial Resource Strain:    [place] Draft Difficulty of Paying Living Expenses:    Food Insecurity:     Worried About Running Out of Food in the Last Year:     [address] in the Last Year:    Transportation Needs:     Lack of Transportation (Medical):      Lack of Transportation (Non-Medical):    Physical Activity:     Days of Exercise per Week:     Minutes of Exercise per Session:    Stress:     Feeling of Stress :    Social Connections:     Frequency of Communication with Friends and Family:     Frequency of Social Gatherings with Friends and Family:     Attends [employer] Services:     Active Member of Clubs or Organizations:     Attends Club or Organization Meetings:     Marital Status:    Intimate Partner Violence:     Fear of Current or Ex-Partner:     Emotionally Abused:     Physically Abused:     Sexually Abused:        Current Outpatient Medications   Medication Sig Dispense Refill    dilTIAZem (CARDIZEM CD) 240 MG extended release capsule Take 1 capsule by mouth 2 times daily 30 capsule 3    ezetimibe (ZETIA) 10 MG tablet Take 1 tablet by mouth nightly 30 tablet 3    metoprolol succinate (TOPROL XL) 100 MG extended release tablet Take 1 tablet by mouth 2 times daily 30 tablet 3    insulin lispro, 1 Unit Dial, (HUMALOG KWIKPEN) 100 UNIT/ML SOPN Inject 35 Units into the skin 3 times daily *Plus Sliding Scale*      insulin glargine (LANTUS SOLOSTAR) 100 UNIT/ML injection pen Inject 45 Units into the skin 2 times daily 55 units 2 times a day      miconazole (MICOTIN) 2 % powder Apply topically 2 times daily Apply to breasts and abdominal folds      mineral oil-hydrophilic petrolatum (AQUAPHOR) ointment Apply topically 3 times daily as needed for Dry Skin      acetaminophen (TYLENOL) 500 MG tablet Take 1,000 mg by mouth every 6 hours as needed for Pain      escitalopram (LEXAPRO) 20 MG tablet Take 1 tablet by mouth daily 30 tablet 0    miconazole nitrate 2 % OINT Apply topically 3 times daily as needed (after toileting) 1 Tube 0    apixaban (ELIQUIS) 5 MG TABS tablet Take 5 mg by mouth 2 times daily      bumetanide (BUMEX) 2 MG tablet Take 2 mg by mouth 2 times daily      gabapentin (NEURONTIN) 400 MG capsule Take 600 mg by mouth 3 times daily.  lansoprazole (PREVACID) 30 MG delayed release capsule Take 30 mg by mouth 2 times daily      traZODone (DESYREL) 50 MG tablet Take 50 mg by mouth nightly      vitamin D (ERGOCALCIFEROL) 1.25 MG (06928 UT) CAPS capsule Take 50,000 Units by mouth once a week Given Monday      omega-3 acid ethyl esters (LOVAZA) 1 g capsule Take 2 capsules by mouth 2 times daily 60 capsule 3    spironolactone (ALDACTONE) 25 MG tablet Take 1 tablet by mouth daily 30 tablet 5    ferrous sulfate (IRON 325) 325 (65 Fe) MG tablet Take 1 tablet by mouth daily (with breakfast) 90 tablet 1    promethazine (PHENERGAN) 25 MG tablet Take 25 mg by mouth every 4 hours as needed for Nausea (Patient not taking: Reported on 6/14/2021)      acetaminophen (TYLENOL) 325 MG tablet Take 650 mg by mouth every 6 hours as needed for Pain or Fever  (Patient not taking: Reported on 6/14/2021)       No current facility-administered medications for this visit. Allergies   Allergen Reactions    Statins Other (See Comments)     Muscle pains       Chief Complaint:  Gabino Cook is here today for follow up and management/recomendations for LVH, PAF, inappropriate sinus tach, HTN. History of Present Illness: Gabino Cook is being seen today for initial hospital follow-up. She was recently mid to the hospital with bacteremia, acute renal sufficiency, and with this reverted back to paroxysmal atrial fibrillation. Her heart rate was controlled. She is now to rehab facility for 1 more day. She uses a walker and has chronic dyspnea on exertion from her severe oxygen requiring COPD. However, she feels a little more dyspneic the last few days. She states that her breathing treatment does help.   She denies any chest discomfort, orthopnea/PND. She has chronic severe lower extremity lymphedema that is not changed. She denies any presyncopal symptoms. REVIEW OF SYSTEMS:  As above. Patient does not complain of any fever, chills, nausea, vomiting or diarrhea. No focal, motor or neurological deficits. No changes in his/her vision, hearing, bowel or bladder habits. She is not known to have a history of thyroid problems. No recent nose bleeds. PHYSICAL EXAM:  Vitals:    06/14/21 1326   BP: 108/68   Pulse: 75   Resp: 14   Weight: 275 lb (124.7 kg)   Height: 5' 3\" (1.6 m)       GENERAL:  She is alert and oriented x 3, communicates well, in no distress. NECK:  Thick, short, difficult to examine. No masses, trachea is mid position. Supple, full ROM, no JVD or bruits. No palpable thyromegaly or lymphadenopathy. HEART: Distant. Regular rate and rhythm. Normal S1 and S2. There is an S4 gallop and a I/VI systolic ejection murmur. LUNGS:  Poor air movement but clear bilaterally. No use of accessory muscles. symmetrical excursion. ABDOMEN: Super morbidly obese. Soft, non-tender. Normal bowel sounds. EXTREMITIES:  Full ROM x 4. Wrapped but very severe bilateral lower extremity edema. Good distal pulses. EYES:  Extraocular muscles intact. PERRL. Normal lids & conjunctiva. ENT:  Nares are clear & not bleeding. Moist mucosa. Normal lips formation. No external masses   NEURO: no tremors, full ROM x 4, EOMI. SKIN:  Warm, dry and intact. Normal turgor. EKG: Sinus rhythm, 75 bpm, nl axis, nonspecific ST - T wave changes. IRBBB      Assessment:   1. Paroxysmal atrial fibrillation. maintaining sinus rhythm today. Not on coumadin d/t prior GIB. She was recently in atrial fibrillation during her hospitalization with bacteremia and acute renal insufficiency. However, she is in sinus rhythm today. 2. Chronic anemia. 3. PFO  4. Diastolic dysfunction  5. Hypervolemia and chronic severe lymphedema.   Very difficult exam.  However, I feel that she is a little hypervolemic from her baseline today. 6. Inappropriate sinus tachycardia. 7. Hypertension, well controled at this time. 8. Morbidly obese  9. Chronic lung disease. On chronic oxygen therapy. 10. Pulmonary hypertension        Recommendations:  1. She is following the cholesterol with Dr. Ivelisse Nation. 2. Add Zaroxolyn before her evening dose tomorrow and again on Thursday only. Given her recent acute renal insufficiency, I would not continue Zaroxolyn after that. Thank you for allowing me to participate in your patient's care.       [address], [address] [facility]  Interventional Cardiology

## 2021-07-05 ENCOUNTER — APPOINTMENT (OUTPATIENT)
Dept: GENERAL RADIOLOGY | Age: 66
DRG: 383 | End: 2021-07-05
Payer: COMMERCIAL

## 2021-07-05 ENCOUNTER — HOSPITAL ENCOUNTER (INPATIENT)
Age: 66
LOS: 8 days | Discharge: NURSING, REHAB OR LONG-TERM CARE | DRG: 383 | End: 2021-07-13
Attending: EMERGENCY MEDICINE | Admitting: FAMILY MEDICINE
Payer: COMMERCIAL

## 2021-07-05 DIAGNOSIS — L03.116 BILATERAL CELLULITIS OF LOWER LEG: Primary | ICD-10-CM

## 2021-07-05 DIAGNOSIS — L03.115 BILATERAL CELLULITIS OF LOWER LEG: Primary | ICD-10-CM

## 2021-07-05 PROBLEM — I27.20 MODERATE PULMONARY HYPERTENSION (HCC): Status: ACTIVE | Noted: 2021-07-05

## 2021-07-05 PROBLEM — J44.9 COPD WITH ASTHMA (HCC): Status: ACTIVE | Noted: 2021-07-05

## 2021-07-05 PROBLEM — R26.2 AMBULATORY DYSFUNCTION: Status: ACTIVE | Noted: 2021-07-05

## 2021-07-05 PROBLEM — J44.89 COPD WITH ASTHMA: Status: ACTIVE | Noted: 2021-07-05

## 2021-07-05 PROBLEM — J96.12 CHRONIC RESPIRATORY FAILURE WITH HYPERCAPNIA (HCC): Status: ACTIVE | Noted: 2021-07-05

## 2021-07-05 PROBLEM — R94.31 QT PROLONGATION: Status: ACTIVE | Noted: 2021-07-05

## 2021-07-05 LAB
ALBUMIN SERPL-MCNC: 3.8 G/DL (ref 3.5–5.2)
ALP BLD-CCNC: 111 U/L (ref 35–104)
ALT SERPL-CCNC: 12 U/L (ref 0–32)
ANION GAP SERPL CALCULATED.3IONS-SCNC: 5 MMOL/L (ref 7–16)
AST SERPL-CCNC: 18 U/L (ref 0–31)
BASOPHILS ABSOLUTE: 0.03 E9/L (ref 0–0.2)
BASOPHILS RELATIVE PERCENT: 0.4 % (ref 0–2)
BILIRUB SERPL-MCNC: <0.2 MG/DL (ref 0–1.2)
BUN BLDV-MCNC: 27 MG/DL (ref 6–23)
C-REACTIVE PROTEIN: 5.3 MG/DL (ref 0–0.4)
CALCIUM SERPL-MCNC: 9.2 MG/DL (ref 8.6–10.2)
CHLORIDE BLD-SCNC: 102 MMOL/L (ref 98–107)
CO2: 32 MMOL/L (ref 22–29)
CREAT SERPL-MCNC: 0.8 MG/DL (ref 0.5–1)
EOSINOPHILS ABSOLUTE: 0.15 E9/L (ref 0.05–0.5)
EOSINOPHILS RELATIVE PERCENT: 2 % (ref 0–6)
GFR AFRICAN AMERICAN: >60
GFR NON-AFRICAN AMERICAN: >60 ML/MIN/1.73
GLUCOSE BLD-MCNC: 266 MG/DL (ref 74–99)
HCT VFR BLD CALC: 31.5 % (ref 34–48)
HEMOGLOBIN: 9.6 G/DL (ref 11.5–15.5)
IMMATURE GRANULOCYTES #: 0.03 E9/L
IMMATURE GRANULOCYTES %: 0.4 % (ref 0–5)
LACTIC ACID: 1.5 MMOL/L (ref 0.5–2.2)
LYMPHOCYTES ABSOLUTE: 1.19 E9/L (ref 1.5–4)
LYMPHOCYTES RELATIVE PERCENT: 15.6 % (ref 20–42)
MAGNESIUM: 2 MG/DL (ref 1.6–2.6)
MCH RBC QN AUTO: 25.2 PG (ref 26–35)
MCHC RBC AUTO-ENTMCNC: 30.5 % (ref 32–34.5)
MCV RBC AUTO: 82.7 FL (ref 80–99.9)
MONOCYTES ABSOLUTE: 0.61 E9/L (ref 0.1–0.95)
MONOCYTES RELATIVE PERCENT: 8 % (ref 2–12)
NEUTROPHILS ABSOLUTE: 5.64 E9/L (ref 1.8–7.3)
NEUTROPHILS RELATIVE PERCENT: 73.6 % (ref 43–80)
PDW BLD-RTO: 16 FL (ref 11.5–15)
PLATELET # BLD: 223 E9/L (ref 130–450)
PMV BLD AUTO: 9.8 FL (ref 7–12)
POTASSIUM SERPL-SCNC: 4.9 MMOL/L (ref 3.5–5)
PRO-BNP: 201 PG/ML (ref 0–125)
PROCALCITONIN: 0.1 NG/ML (ref 0–0.08)
RBC # BLD: 3.81 E12/L (ref 3.5–5.5)
SEDIMENTATION RATE, ERYTHROCYTE: 41 MM/HR (ref 0–20)
SODIUM BLD-SCNC: 139 MMOL/L (ref 132–146)
TOTAL PROTEIN: 7 G/DL (ref 6.4–8.3)
WBC # BLD: 7.7 E9/L (ref 4.5–11.5)

## 2021-07-05 PROCEDURE — 83880 ASSAY OF NATRIURETIC PEPTIDE: CPT

## 2021-07-05 PROCEDURE — 2580000003 HC RX 258: Performed by: EMERGENCY MEDICINE

## 2021-07-05 PROCEDURE — 83735 ASSAY OF MAGNESIUM: CPT

## 2021-07-05 PROCEDURE — 96365 THER/PROPH/DIAG IV INF INIT: CPT

## 2021-07-05 PROCEDURE — 73590 X-RAY EXAM OF LOWER LEG: CPT

## 2021-07-05 PROCEDURE — 85651 RBC SED RATE NONAUTOMATED: CPT

## 2021-07-05 PROCEDURE — 87070 CULTURE OTHR SPECIMN AEROBIC: CPT

## 2021-07-05 PROCEDURE — 6360000002 HC RX W HCPCS: Performed by: EMERGENCY MEDICINE

## 2021-07-05 PROCEDURE — 87186 SC STD MICRODIL/AGAR DIL: CPT

## 2021-07-05 PROCEDURE — 85025 COMPLETE CBC W/AUTO DIFF WBC: CPT

## 2021-07-05 PROCEDURE — 87040 BLOOD CULTURE FOR BACTERIA: CPT

## 2021-07-05 PROCEDURE — 1200000000 HC SEMI PRIVATE

## 2021-07-05 PROCEDURE — 96375 TX/PRO/DX INJ NEW DRUG ADDON: CPT

## 2021-07-05 PROCEDURE — 86140 C-REACTIVE PROTEIN: CPT

## 2021-07-05 PROCEDURE — 36415 COLL VENOUS BLD VENIPUNCTURE: CPT

## 2021-07-05 PROCEDURE — 84145 PROCALCITONIN (PCT): CPT

## 2021-07-05 PROCEDURE — 99284 EMERGENCY DEPT VISIT MOD MDM: CPT

## 2021-07-05 PROCEDURE — 80053 COMPREHEN METABOLIC PANEL: CPT

## 2021-07-05 PROCEDURE — 83605 ASSAY OF LACTIC ACID: CPT

## 2021-07-05 RX ORDER — ONDANSETRON 4 MG/1
4 TABLET, ORALLY DISINTEGRATING ORAL EVERY 8 HOURS PRN
Status: DISCONTINUED | OUTPATIENT
Start: 2021-07-05 | End: 2021-07-13 | Stop reason: HOSPADM

## 2021-07-05 RX ORDER — TRAZODONE HYDROCHLORIDE 50 MG/1
50 TABLET ORAL NIGHTLY
Status: DISCONTINUED | OUTPATIENT
Start: 2021-07-05 | End: 2021-07-13 | Stop reason: HOSPADM

## 2021-07-05 RX ORDER — ACETAMINOPHEN 325 MG/1
650 TABLET ORAL EVERY 6 HOURS PRN
Status: DISCONTINUED | OUTPATIENT
Start: 2021-07-05 | End: 2021-07-13 | Stop reason: HOSPADM

## 2021-07-05 RX ORDER — SODIUM CHLORIDE 0.9 % (FLUSH) 0.9 %
5-40 SYRINGE (ML) INJECTION PRN
Status: DISCONTINUED | OUTPATIENT
Start: 2021-07-05 | End: 2021-07-13 | Stop reason: HOSPADM

## 2021-07-05 RX ORDER — PANTOPRAZOLE SODIUM 40 MG/1
40 TABLET, DELAYED RELEASE ORAL
Status: DISCONTINUED | OUTPATIENT
Start: 2021-07-06 | End: 2021-07-13 | Stop reason: HOSPADM

## 2021-07-05 RX ORDER — SODIUM CHLORIDE 0.9 % (FLUSH) 0.9 %
5-40 SYRINGE (ML) INJECTION EVERY 12 HOURS SCHEDULED
Status: DISCONTINUED | OUTPATIENT
Start: 2021-07-05 | End: 2021-07-13 | Stop reason: HOSPADM

## 2021-07-05 RX ORDER — ACETAMINOPHEN 650 MG/1
650 SUPPOSITORY RECTAL EVERY 6 HOURS PRN
Status: DISCONTINUED | OUTPATIENT
Start: 2021-07-05 | End: 2021-07-13 | Stop reason: HOSPADM

## 2021-07-05 RX ORDER — ONDANSETRON 2 MG/ML
4 INJECTION INTRAMUSCULAR; INTRAVENOUS EVERY 6 HOURS PRN
Status: DISCONTINUED | OUTPATIENT
Start: 2021-07-05 | End: 2021-07-13 | Stop reason: HOSPADM

## 2021-07-05 RX ORDER — POLYETHYLENE GLYCOL 3350 17 G/17G
17 POWDER, FOR SOLUTION ORAL DAILY PRN
Status: DISCONTINUED | OUTPATIENT
Start: 2021-07-05 | End: 2021-07-13 | Stop reason: HOSPADM

## 2021-07-05 RX ORDER — FERROUS SULFATE 325(65) MG
325 TABLET ORAL
Status: DISCONTINUED | OUTPATIENT
Start: 2021-07-06 | End: 2021-07-13 | Stop reason: HOSPADM

## 2021-07-05 RX ORDER — EZETIMIBE 10 MG/1
10 TABLET ORAL NIGHTLY
Status: DISCONTINUED | OUTPATIENT
Start: 2021-07-05 | End: 2021-07-13 | Stop reason: HOSPADM

## 2021-07-05 RX ORDER — KETOROLAC TROMETHAMINE 30 MG/ML
15 INJECTION, SOLUTION INTRAMUSCULAR; INTRAVENOUS ONCE
Status: DISCONTINUED | OUTPATIENT
Start: 2021-07-05 | End: 2021-07-05

## 2021-07-05 RX ORDER — SODIUM CHLORIDE 9 MG/ML
25 INJECTION, SOLUTION INTRAVENOUS PRN
Status: DISCONTINUED | OUTPATIENT
Start: 2021-07-05 | End: 2021-07-13 | Stop reason: HOSPADM

## 2021-07-05 RX ORDER — DEXTROSE MONOHYDRATE 50 MG/ML
100 INJECTION, SOLUTION INTRAVENOUS PRN
Status: DISCONTINUED | OUTPATIENT
Start: 2021-07-05 | End: 2021-07-06

## 2021-07-05 RX ORDER — FUROSEMIDE 10 MG/ML
40 INJECTION INTRAMUSCULAR; INTRAVENOUS 2 TIMES DAILY
Status: DISCONTINUED | OUTPATIENT
Start: 2021-07-05 | End: 2021-07-06

## 2021-07-05 RX ORDER — FENTANYL CITRATE 50 UG/ML
50 INJECTION, SOLUTION INTRAMUSCULAR; INTRAVENOUS ONCE
Status: COMPLETED | OUTPATIENT
Start: 2021-07-05 | End: 2021-07-05

## 2021-07-05 RX ORDER — SPIRONOLACTONE 25 MG/1
25 TABLET ORAL DAILY
Status: DISCONTINUED | OUTPATIENT
Start: 2021-07-06 | End: 2021-07-13 | Stop reason: HOSPADM

## 2021-07-05 RX ORDER — DILTIAZEM HYDROCHLORIDE 240 MG/1
240 CAPSULE, COATED, EXTENDED RELEASE ORAL 2 TIMES DAILY
Status: DISCONTINUED | OUTPATIENT
Start: 2021-07-05 | End: 2021-07-13 | Stop reason: HOSPADM

## 2021-07-05 RX ORDER — GABAPENTIN 300 MG/1
600 CAPSULE ORAL 3 TIMES DAILY
Status: DISCONTINUED | OUTPATIENT
Start: 2021-07-05 | End: 2021-07-13 | Stop reason: HOSPADM

## 2021-07-05 RX ORDER — DEXTROSE MONOHYDRATE 25 G/50ML
12.5 INJECTION, SOLUTION INTRAVENOUS PRN
Status: DISCONTINUED | OUTPATIENT
Start: 2021-07-05 | End: 2021-07-06

## 2021-07-05 RX ORDER — METOPROLOL SUCCINATE 100 MG/1
100 TABLET, EXTENDED RELEASE ORAL 2 TIMES DAILY
Status: DISCONTINUED | OUTPATIENT
Start: 2021-07-05 | End: 2021-07-13 | Stop reason: HOSPADM

## 2021-07-05 RX ORDER — INSULIN GLARGINE 100 [IU]/ML
45 INJECTION, SOLUTION SUBCUTANEOUS 2 TIMES DAILY
Status: DISCONTINUED | OUTPATIENT
Start: 2021-07-05 | End: 2021-07-13 | Stop reason: HOSPADM

## 2021-07-05 RX ORDER — NICOTINE POLACRILEX 4 MG
15 LOZENGE BUCCAL PRN
Status: DISCONTINUED | OUTPATIENT
Start: 2021-07-05 | End: 2021-07-06

## 2021-07-05 RX ORDER — ESCITALOPRAM OXALATE 10 MG/1
20 TABLET ORAL DAILY
Status: DISCONTINUED | OUTPATIENT
Start: 2021-07-06 | End: 2021-07-11

## 2021-07-05 RX ADMIN — FENTANYL CITRATE 50 MCG: 50 INJECTION, SOLUTION INTRAMUSCULAR; INTRAVENOUS at 19:14

## 2021-07-05 RX ADMIN — MEROPENEM 1000 MG: 1 INJECTION, POWDER, FOR SOLUTION INTRAVENOUS at 20:43

## 2021-07-05 ASSESSMENT — ENCOUNTER SYMPTOMS
COLOR CHANGE: 1
SHORTNESS OF BREATH: 0
BLOOD IN STOOL: 0
ABDOMINAL PAIN: 0
VOMITING: 0
NAUSEA: 0
COUGH: 0
BACK PAIN: 0
RHINORRHEA: 0

## 2021-07-05 ASSESSMENT — PAIN DESCRIPTION - PAIN TYPE: TYPE: ACUTE PAIN

## 2021-07-05 ASSESSMENT — PAIN SCALES - GENERAL
PAINLEVEL_OUTOF10: 7
PAINLEVEL_OUTOF10: 10

## 2021-07-05 ASSESSMENT — PAIN DESCRIPTION - LOCATION: LOCATION: LEG

## 2021-07-05 ASSESSMENT — PAIN DESCRIPTION - ORIENTATION: ORIENTATION: RIGHT;LEFT;PROXIMAL

## 2021-07-05 NOTE — ED PROVIDER NOTES
ED PROVIDER NOTE    Chief Complaint   Patient presents with    Leg Swelling     pt has bilateral leg pain with swelling and redness noted to lower legs       HPI:  7/5/21,   Time: 3:48 PM EDT       Oli Montague is a 77 y.o. female presenting to the ED for leg swelling and leg wounds. Gradual onset 1 week ago, progressively worsening, associated difficulty walking. Has chronic lymphedema however over the past 1 week has had worsening swelling and red wounds from bilateral legs L>R. Associated chills. No fever, nausea, vomiting, diarrhea, abdominal pain, chest pain, shortness of breath, orthopnea. Normal po intake and urine output. Compliant with home diuretics but did not take them today. Stable on home O2 4L. Chart review: hx of afib and DVT on apixaban, DM, HTN, HLD, pulmonary HTN, HFpEF, COPD, chronic hypoxic respiratory failure  Lab Results   Component Value Date    LVEF 63 04/14/2021    LVEFMODE Echo 01/16/2018       Review of Systems:     Review of Systems   Constitutional: Negative for appetite change, chills and fever. HENT: Negative for congestion and rhinorrhea. Eyes: Negative for visual disturbance. Respiratory: Negative for cough and shortness of breath. Cardiovascular: Positive for leg swelling. Negative for chest pain. Gastrointestinal: Negative for abdominal pain, blood in stool, nausea and vomiting. Genitourinary: Negative for decreased urine volume and difficulty urinating. Musculoskeletal: Negative for back pain and neck pain. Skin: Positive for color change and wound.    Neurological: Negative for dizziness, syncope, weakness, light-headedness, numbness and headaches.         --------------------------------------------- PAST HISTORY ---------------------------------------------  Past Medical History:   Past Medical History:   Diagnosis Date    (HFpEF) heart failure with preserved ejection fraction [facility]     1/16/2018- echo- LVEF 55-65%    Anal fissure     Anemia 10/6/2017  Anxiety and depression     Arthritis     Asthma     Atrial fibrillation (HCC)     Eliquis    Blood transfusion     long time no reaction    CHF (congestive heart failure) (HCC)     Diastolic    COPD (chronic obstructive pulmonary disease) (HCC)     Disc disorder     DM2 (diabetes mellitus, type 2) ([facility]     on insulin    Fall due to stumbling 10/6/2017    GERD (gastroesophageal reflux disease)     History of blood transfusion     Hx of blood clots     Hyperlipidemia     Hypertension     Inappropriate sinus tachycardia     LVH (left ventricular hypertrophy)     moderate    Lymphadenitis, chronic 4/13/2018    Occipital neuralgia 11/2/2011    Respiratory acidosis 10/7/2017    Sinus tachycardia 2/16/2015       Past Surgical History:   Past Surgical History:   Procedure Laterality Date    ANOSCOPY  10/25/2006    injection of anal sphincter with Botox, Franklyn Ortiz/Timmy, [facility]    ANOSCOPY  4/9/2007    injection of anal sphincter with Botox, Dr. Kamla Alexandra, [address] [place] [address] ANOSCOPY  6/13/2007    injection of anal sphincter with Botox, Franklyn Nevarez [facility]    ANUS SURGERY  4/4/2006    Lateral sphincterotomy for chronic anal fissure, Dr. Maria Fernanda Perkins, [facility]  4/18/2012    anal exam and injection of Botox 100 units into anal sphincter, Laila Nevarez, [facility]    APPENDECTOMY  1965   [address]    COLONOSCOPY  1/20/2004    cecal polyp, bx (no pathologic changes), Dr. Audrey Guillermo, [address] [place] [address] COLONOSCOPY  8/11/2006    snare polypectomy terminal ileum polyp (granulation tissue polyp) and anal polyp (inflammatory/papilla), Dr. Kamla Alexandra, [address] [place] [address] COLONOSCOPY  3/23/2012    bx/cauterization proximal ascending colon polyp, injection of anal sphincter with Botox, Dr. Kamla Alexandra, [facility]    ENDOSCOPY, COLON, DIAGNOSTIC      FRACTURE SURGERY      R leg fracture with surgery for repair    JOINT REPLACEMENT  2003    L knee    ND DEBRIDEMENT, SKIN, Week:     Minutes of Exercise per Session:    Stress:     Feeling of Stress :    Social Connections:     Frequency of Communication with Friends and Family:     Frequency of Social Gatherings with Friends and Family:     Attends Gnosticism Services:     Active Member of Clubs or Organizations:     Attends Club or Organization Meetings:     Marital Status:    Intimate Partner Violence:     Fear of Current or Ex-Partner:     Emotionally Abused:     Physically Abused:     Sexually Abused:        Family History:   Family History   Problem Relation Age of Onset    Heart Disease Mother     Diabetes Father    Lois Hyatt Father     Other Father         Kia Courtney Sister     Other Sister         shingles- has had over 1 year    Diabetes Paternal Aunt     Diabetes Paternal Uncle     Diabetes Paternal Aunt     Diabetes Paternal Uncle     Diabetes Sister        The patients home medications have been reviewed. Allergies: Allergies   Allergen Reactions    Statins Other (See Comments)     Muscle pains           ---------------------------------------------------PHYSICAL EXAM--------------------------------------    BP (!) 142/73   Pulse 95   Temp 98.2 °F (36.8 °C) (Temporal)   Resp 18   Ht 5' 3\" (1.6 m)   Wt 260 lb (117.9 kg)   LMP  (LMP Unknown)   SpO2 98%   BMI 46.06 kg/m²     Physical Exam  Constitutional:       General: She is not in acute distress. Appearance: She is not toxic-appearing. HENT:      Mouth/Throat:      Mouth: Mucous membranes are moist.   Eyes:      General: No scleral icterus. Extraocular Movements: Extraocular movements intact. Pupils: Pupils are equal, round, and reactive to light. Neck:      Comments: No JVD  Cardiovascular:      Rate and Rhythm: Normal rate and regular rhythm. Pulses: Normal pulses. Heart sounds: Normal heart sounds. No murmur heard.      Pulmonary:      Effort: Pulmonary effort is normal. No respiratory distress. Breath sounds: Normal breath sounds. No wheezing or rales. Abdominal:      General: There is no distension. Palpations: Abdomen is soft. Tenderness: There is no abdominal tenderness. Musculoskeletal:         General: No swelling or tenderness. Normal range of motion. Cervical back: Normal range of motion and neck supple. Comments: 2+ radial and DP pulses bilaterally. BLE warm and well perfused. BLE lymphedema   Skin:     General: Skin is warm and dry. Neurological:      Mental Status: She is alert and oriented to person, place, and time. Comments: BUE strength 5/5 throughout. BLE global weakness 3/5 strength throughout. SILT in all extremities                -------------------------------------------------- RESULTS -------------------------------------------------  I have personally reviewed all laboratory and imaging results for this patient. Results are listed below.      LABS:  Labs Reviewed   CBC WITH AUTO DIFFERENTIAL - Abnormal; Notable for the following components:       Result Value    Hemoglobin 9.6 (*)     Hematocrit 31.5 (*)     MCH 25.2 (*)     MCHC 30.5 (*)     RDW 16.0 (*)     Lymphocytes % 15.6 (*)     Lymphocytes Absolute 1.19 (*)     All other components within normal limits   COMPREHENSIVE METABOLIC PANEL - Abnormal; Notable for the following components:    CO2 32 (*)     Anion Gap 5 (*)     Glucose 266 (*)     BUN 27 (*)     Alkaline Phosphatase 111 (*)     All other components within normal limits   BRAIN NATRIURETIC PEPTIDE - Abnormal; Notable for the following components:    Pro- (*)     All other components within normal limits   PROCALCITONIN - Abnormal; Notable for the following components:    Procalcitonin 0.10 (*)     All other components within normal limits   SEDIMENTATION RATE - Abnormal; Notable for the following components:    Sed Rate 41 (*)     All other components within normal limits   C-REACTIVE PROTEIN - Abnormal; Notable for patient verbalized understanding and agreement with that plan. We will await the results of her ambulation trial.    [ML]    Discussed the case with internal medicine who agrees to admit the patient for further evaluation and management. They state they will place the bed orders. [ML]      ED Course User Index  [ML] Collette Harris DO       Counseling: The emergency provider has spoken with the patient and discussed todays results, in addition to providing specific details for the plan of care and counseling regarding the diagnosis and prognosis. Questions are answered at this time and they are agreeable with the plan. ED Course/Medical Decision Makin y.o. female here with acute on chronic bilateral leg swelling and wounds. Non-toxic appearing, afebrile, hemodynamically stable, and in no acute distress. Exam concerning for superimposed infection. Recent admission for ESBL E Coli bacteremia. Started vancomycin and meropenem. Blood cxs pending. Patient is unable to ambulate in the ED. Admitted in stable condition for further management.       --------------------------------- IMPRESSION AND DISPOSITION ---------------------------------    IMPRESSION  1. Bilateral cellulitis of lower leg        DISPOSITION  Disposition: Admit to telemetry  Patient condition is stable    NOTE: This report was transcribed using voice recognition software.  Every effort was made to ensure accuracy; however, inadvertent computerized transcription errors may be present    Bruna Stern MD  Attending Emergency Physician         Bruna Stern MD  21 0040

## 2021-07-05 NOTE — ED NOTES
Patient unable to ambulate without 2 staff members lifting her up on both sides.      Elaine Perry RN  07/05/21 7378

## 2021-07-05 NOTE — ED NOTES
Blood cultures obtained from L hand, per policy. Set two of two drawn at this time.                Read BERNABE Hinojosa  07/05/21 5647

## 2021-07-05 NOTE — LETTER
[place] Department Medicaid  CERTIFICATION OF NECESSITY  FOR NON-EMERGENCY TRANSPORTATION   BY GROUND AMBULANCE      Individual Information   1. Name: Vidal Payne 2. [place] Medicaid Billing Number:    3. Address: [address] [place] [address]      Transportation Provider Information   4. Provider Name:    5. [place] Medicaid Provider Number:  National Provider Identifier (NPI):      Certification  7. Criteria:  During transport, this individual requires:  [] Medical treatment or continuous     supervision by an EMT. [x] The administration or regulation of oxygen by another person. [] Supervised protective restraint. 8. Period Beginning Date: 2021   9. Length  [x] Not more than 10 day(s)  [] One Year     Additional Information Relevant to Certification   10. Comments or Explanations, If Necessary or Appropriate     Pt is non-ambulatory, max assist of 2 due to extensive wounds with bilateral cellulitis. Certifying Practitioner Information   11. Name of Practitioner: Kimmie Orta MD   12. [place] Medicaid Provider Number, If Applicable:  [address] Provider Identifier (NPI): [id number]     Signature Information   14. Date of Signature: 2021 15. Name of Person Signing: Laureano Haque/Discharge planner   12. Signature and Professional Designation: Laureano Haque/Discharge planner     [facility] [id number]  Rev. 2015          [address] [place] [address] Encounter Date/Time: 2021 [address] Account: [de-identified]    MRN: [id number]    Patient: Vidal Payne    Contact Serial #: [id number]      ENCOUNTER          Patient Class: I Private Enc? No Unit RM BD: [address] Service: INM   Encounter DX: Bilateral cellulitis of *   ADM Provider: Genoveva Gan MD   Procedure:     ATT Provider: Myles Erickson MD   REF Provider:        Admission DX:  Bilateral cellulitis of lower leg and DX codes: L03.116, L03.115      PATIENT                 Name: Vidal Payne : 1955 (65 yrs)   Address: [address] [place] [address] Sex: Female   [facility] [address]         Marital Status:    Employer: DISABLED         Congregation: [employer]   Primary Care Provider: Brittany Sahni         Primary Phone: [phone]   EMERGENCY CONTACT   Contact Name Legal Guardian? Relationship to Patient Home Phone Work Phone   1. Christihood Derrell  2. José He No  No Child  Child [phone]              GUARANTOR            Guarantor: Jose Manuel Walters     : 1955   Address: [address] [place] [address] Sex: Female   [place] [id number]     Relation to Patient: Self       Home Phone: [phone]   Guarantor ID: [id number]       Work Phone:     Guarantor Employer: DISABLED         Status: DISABLED      COVERAGE        PRIMARY INSURANCE   Payor: Rafael Aquino Plan: [facility] MEDICAID   Payor Address: CLAIMS DEPARTMENT;[address],  [address] [place] [address], [address] [place] [address]       Group Number: [id number] Insurance Type: INDEMNITY   Subscriber Name: Vikash Hurtado : 1955   Subscriber ID: [id number] Pat. Rel. to Sub: Self   SECONDARY INSURANCE   Payor:   Plan:     Payor Address:  ,           Group Number:   Insurance Type:     Subscriber Name:   Subscriber :     Subscriber ID:   Pat.  Rel. to Sub:

## 2021-07-06 LAB
ANION GAP SERPL CALCULATED.3IONS-SCNC: 8 MMOL/L (ref 7–16)
BACTERIA: ABNORMAL /HPF
BASOPHILS ABSOLUTE: 0.03 E9/L (ref 0–0.2)
BASOPHILS RELATIVE PERCENT: 0.4 % (ref 0–2)
BILIRUBIN URINE: NEGATIVE
BLOOD, URINE: ABNORMAL
BUN BLDV-MCNC: 23 MG/DL (ref 6–23)
CALCIUM SERPL-MCNC: 8.8 MG/DL (ref 8.6–10.2)
CHLORIDE BLD-SCNC: 97 MMOL/L (ref 98–107)
CLARITY: CLEAR
CO2: 32 MMOL/L (ref 22–29)
COLOR: YELLOW
CREAT SERPL-MCNC: 0.7 MG/DL (ref 0.5–1)
EOSINOPHILS ABSOLUTE: 0.06 E9/L (ref 0.05–0.5)
EOSINOPHILS RELATIVE PERCENT: 0.8 % (ref 0–6)
GFR AFRICAN AMERICAN: >60
GFR NON-AFRICAN AMERICAN: >60 ML/MIN/1.73
GLUCOSE BLD-MCNC: 370 MG/DL (ref 74–99)
GLUCOSE URINE: >=1000 MG/DL
HBA1C MFR BLD: 7.9 % (ref 4–5.6)
HCT VFR BLD CALC: 29 % (ref 34–48)
HEMOGLOBIN: 8.7 G/DL (ref 11.5–15.5)
IMMATURE GRANULOCYTES #: 0.03 E9/L
IMMATURE GRANULOCYTES %: 0.4 % (ref 0–5)
KETONES, URINE: NEGATIVE MG/DL
LEUKOCYTE ESTERASE, URINE: NEGATIVE
LYMPHOCYTES ABSOLUTE: 1.25 E9/L (ref 1.5–4)
LYMPHOCYTES RELATIVE PERCENT: 16 % (ref 20–42)
MCH RBC QN AUTO: 24.7 PG (ref 26–35)
MCHC RBC AUTO-ENTMCNC: 30 % (ref 32–34.5)
MCV RBC AUTO: 82.4 FL (ref 80–99.9)
METER GLUCOSE: 276 MG/DL (ref 74–99)
METER GLUCOSE: 281 MG/DL (ref 74–99)
METER GLUCOSE: 341 MG/DL (ref 74–99)
METER GLUCOSE: 401 MG/DL (ref 74–99)
METER GLUCOSE: 437 MG/DL (ref 74–99)
MONOCYTES ABSOLUTE: 0.51 E9/L (ref 0.1–0.95)
MONOCYTES RELATIVE PERCENT: 6.5 % (ref 2–12)
NEUTROPHILS ABSOLUTE: 5.91 E9/L (ref 1.8–7.3)
NEUTROPHILS RELATIVE PERCENT: 75.9 % (ref 43–80)
NITRITE, URINE: NEGATIVE
PDW BLD-RTO: 15.9 FL (ref 11.5–15)
PH UA: 6 (ref 5–9)
PLATELET # BLD: 206 E9/L (ref 130–450)
PMV BLD AUTO: 9.5 FL (ref 7–12)
POTASSIUM REFLEX MAGNESIUM: 4.3 MMOL/L (ref 3.5–5)
PROTEIN UA: NEGATIVE MG/DL
RBC # BLD: 3.52 E12/L (ref 3.5–5.5)
RBC UA: ABNORMAL /HPF (ref 0–2)
SODIUM BLD-SCNC: 137 MMOL/L (ref 132–146)
SPECIFIC GRAVITY UA: 1.02 (ref 1–1.03)
UROBILINOGEN, URINE: 0.2 E.U./DL
WBC # BLD: 7.8 E9/L (ref 4.5–11.5)
WBC UA: ABNORMAL /HPF (ref 0–5)

## 2021-07-06 PROCEDURE — 6370000000 HC RX 637 (ALT 250 FOR IP): Performed by: FAMILY MEDICINE

## 2021-07-06 PROCEDURE — 2700000000 HC OXYGEN THERAPY PER DAY

## 2021-07-06 PROCEDURE — 1200000000 HC SEMI PRIVATE

## 2021-07-06 PROCEDURE — 83036 HEMOGLOBIN GLYCOSYLATED A1C: CPT

## 2021-07-06 PROCEDURE — 94664 DEMO&/EVAL PT USE INHALER: CPT

## 2021-07-06 PROCEDURE — 2580000003 HC RX 258: Performed by: EMERGENCY MEDICINE

## 2021-07-06 PROCEDURE — 6370000000 HC RX 637 (ALT 250 FOR IP): Performed by: NURSE PRACTITIONER

## 2021-07-06 PROCEDURE — 81001 URINALYSIS AUTO W/SCOPE: CPT

## 2021-07-06 PROCEDURE — 6360000002 HC RX W HCPCS: Performed by: FAMILY MEDICINE

## 2021-07-06 PROCEDURE — 82962 GLUCOSE BLOOD TEST: CPT

## 2021-07-06 PROCEDURE — 6360000002 HC RX W HCPCS: Performed by: EMERGENCY MEDICINE

## 2021-07-06 PROCEDURE — 2580000003 HC RX 258: Performed by: FAMILY MEDICINE

## 2021-07-06 PROCEDURE — 80048 BASIC METABOLIC PNL TOTAL CA: CPT

## 2021-07-06 PROCEDURE — 85025 COMPLETE CBC W/AUTO DIFF WBC: CPT

## 2021-07-06 PROCEDURE — 2500000003 HC RX 250 WO HCPCS: Performed by: FAMILY MEDICINE

## 2021-07-06 RX ORDER — INSULIN GLARGINE 100 [IU]/ML
0.25 INJECTION, SOLUTION SUBCUTANEOUS NIGHTLY
Status: DISCONTINUED | OUTPATIENT
Start: 2021-07-06 | End: 2021-07-06

## 2021-07-06 RX ORDER — DEXTROSE MONOHYDRATE 25 G/50ML
12.5 INJECTION, SOLUTION INTRAVENOUS PRN
Status: DISCONTINUED | OUTPATIENT
Start: 2021-07-06 | End: 2021-07-13 | Stop reason: HOSPADM

## 2021-07-06 RX ORDER — OXYCODONE HYDROCHLORIDE AND ACETAMINOPHEN 5; 325 MG/1; MG/1
1 TABLET ORAL EVERY 4 HOURS PRN
Status: DISCONTINUED | OUTPATIENT
Start: 2021-07-06 | End: 2021-07-13 | Stop reason: HOSPADM

## 2021-07-06 RX ORDER — DEXTROSE MONOHYDRATE 50 MG/ML
100 INJECTION, SOLUTION INTRAVENOUS PRN
Status: DISCONTINUED | OUTPATIENT
Start: 2021-07-06 | End: 2021-07-13 | Stop reason: HOSPADM

## 2021-07-06 RX ORDER — IPRATROPIUM BROMIDE AND ALBUTEROL SULFATE 2.5; .5 MG/3ML; MG/3ML
1 SOLUTION RESPIRATORY (INHALATION) EVERY 4 HOURS PRN
Status: DISCONTINUED | OUTPATIENT
Start: 2021-07-06 | End: 2021-07-07

## 2021-07-06 RX ORDER — NICOTINE POLACRILEX 4 MG
15 LOZENGE BUCCAL PRN
Status: DISCONTINUED | OUTPATIENT
Start: 2021-07-06 | End: 2021-07-13 | Stop reason: HOSPADM

## 2021-07-06 RX ORDER — BUMETANIDE 1 MG/1
2 TABLET ORAL 2 TIMES DAILY
Status: CANCELLED | OUTPATIENT
Start: 2021-07-06

## 2021-07-06 RX ADMIN — EZETIMIBE 10 MG: 10 TABLET ORAL at 21:59

## 2021-07-06 RX ADMIN — ACETAMINOPHEN 650 MG: 325 TABLET ORAL at 11:25

## 2021-07-06 RX ADMIN — MEROPENEM 1000 MG: 1 INJECTION, POWDER, FOR SOLUTION INTRAVENOUS at 22:00

## 2021-07-06 RX ADMIN — INSULIN LISPRO 35 UNITS: 100 INJECTION, SOLUTION INTRAVENOUS; SUBCUTANEOUS at 08:31

## 2021-07-06 RX ADMIN — FERROUS SULFATE TAB 325 MG (65 MG ELEMENTAL FE) 325 MG: 325 (65 FE) TAB at 08:31

## 2021-07-06 RX ADMIN — IPRATROPIUM BROMIDE AND ALBUTEROL SULFATE 1 AMPULE: .5; 2.5 SOLUTION RESPIRATORY (INHALATION) at 17:16

## 2021-07-06 RX ADMIN — APIXABAN 5 MG: 5 TABLET, FILM COATED ORAL at 08:30

## 2021-07-06 RX ADMIN — OXYCODONE AND ACETAMINOPHEN 1 TABLET: 5; 325 TABLET ORAL at 23:37

## 2021-07-06 RX ADMIN — GABAPENTIN 600 MG: 300 CAPSULE ORAL at 21:32

## 2021-07-06 RX ADMIN — INSULIN LISPRO 12 UNITS: 100 INJECTION, SOLUTION INTRAVENOUS; SUBCUTANEOUS at 12:06

## 2021-07-06 RX ADMIN — INSULIN LISPRO 3 UNITS: 100 INJECTION, SOLUTION INTRAVENOUS; SUBCUTANEOUS at 21:36

## 2021-07-06 RX ADMIN — SODIUM CHLORIDE, PRESERVATIVE FREE 10 ML: 5 INJECTION INTRAVENOUS at 21:39

## 2021-07-06 RX ADMIN — VANCOMYCIN HYDROCHLORIDE 2000 MG: 10 INJECTION, POWDER, LYOPHILIZED, FOR SOLUTION INTRAVENOUS at 22:42

## 2021-07-06 RX ADMIN — VANCOMYCIN HYDROCHLORIDE 2000 MG: 10 INJECTION, POWDER, LYOPHILIZED, FOR SOLUTION INTRAVENOUS at 10:10

## 2021-07-06 RX ADMIN — GABAPENTIN 600 MG: 300 CAPSULE ORAL at 14:57

## 2021-07-06 RX ADMIN — ACETAMINOPHEN 650 MG: 325 TABLET ORAL at 01:02

## 2021-07-06 RX ADMIN — ACETAMINOPHEN 650 MG: 325 TABLET ORAL at 21:33

## 2021-07-06 RX ADMIN — DILTIAZEM HYDROCHLORIDE 240 MG: 240 CAPSULE, EXTENDED RELEASE ORAL at 22:00

## 2021-07-06 RX ADMIN — INSULIN GLARGINE 45 UNITS: 100 INJECTION, SOLUTION SUBCUTANEOUS at 21:46

## 2021-07-06 RX ADMIN — METOPROLOL SUCCINATE 100 MG: 100 TABLET, EXTENDED RELEASE ORAL at 08:30

## 2021-07-06 RX ADMIN — GABAPENTIN 600 MG: 300 CAPSULE ORAL at 08:30

## 2021-07-06 RX ADMIN — INSULIN GLARGINE 45 UNITS: 100 INJECTION, SOLUTION SUBCUTANEOUS at 08:31

## 2021-07-06 RX ADMIN — INSULIN LISPRO 9 UNITS: 100 INJECTION, SOLUTION INTRAVENOUS; SUBCUTANEOUS at 17:21

## 2021-07-06 RX ADMIN — TRAZODONE HYDROCHLORIDE 50 MG: 50 TABLET ORAL at 21:32

## 2021-07-06 RX ADMIN — APIXABAN 5 MG: 5 TABLET, FILM COATED ORAL at 21:32

## 2021-07-06 RX ADMIN — PETROLATUM: 420 OINTMENT TOPICAL at 21:32

## 2021-07-06 RX ADMIN — INSULIN LISPRO 10 UNITS: 100 INJECTION, SOLUTION INTRAVENOUS; SUBCUTANEOUS at 08:33

## 2021-07-06 RX ADMIN — PANTOPRAZOLE SODIUM 40 MG: 40 TABLET, DELAYED RELEASE ORAL at 08:31

## 2021-07-06 RX ADMIN — MICONAZOLE NITRATE: 20.6 POWDER TOPICAL at 21:32

## 2021-07-06 RX ADMIN — METOPROLOL SUCCINATE 100 MG: 100 TABLET, EXTENDED RELEASE ORAL at 21:32

## 2021-07-06 ASSESSMENT — PAIN DESCRIPTION - LOCATION: LOCATION: LEG

## 2021-07-06 ASSESSMENT — PAIN SCALES - GENERAL
PAINLEVEL_OUTOF10: 8
PAINLEVEL_OUTOF10: 10
PAINLEVEL_OUTOF10: 10
PAINLEVEL_OUTOF10: 8
PAINLEVEL_OUTOF10: 5
PAINLEVEL_OUTOF10: 4

## 2021-07-06 ASSESSMENT — PAIN DESCRIPTION - PAIN TYPE: TYPE: ACUTE PAIN

## 2021-07-06 ASSESSMENT — PAIN DESCRIPTION - ORIENTATION: ORIENTATION: RIGHT;LEFT

## 2021-07-06 ASSESSMENT — PAIN DESCRIPTION - DESCRIPTORS: DESCRIPTORS: ACHING;DISCOMFORT

## 2021-07-06 ASSESSMENT — PAIN DESCRIPTION - FREQUENCY: FREQUENCY: CONTINUOUS

## 2021-07-06 NOTE — ED NOTES
Pt instructed that she is at a high risk for falls since she has so much pain and swelling to BLE. Pt continues to move herself to side of the bed. She is unable to pull herself back into bed safely. Instructed pt on risks of moving herself. Pt refused teaching. Moved to side of bed.      Antonio Parikh RN  07/06/21 7776

## 2021-07-06 NOTE — ED NOTES
Just poked patient for blood cultures and patient doesn't want to be poked again right at this time. Patient asked Natalie Moulton didn't they order it when you just anjali my blood? \" will try again at another time.      Rupa Fajardo RN  07/05/21 2033

## 2021-07-06 NOTE — PROGRESS NOTES
Pharmacy Consultation Note  (Antibiotic Dosing and Monitoring)    Initial consult date: 2021  Consulting physician:  Pro Dean  Drug: Vancomycin  Indication: Skin and Soft Tissue Infection    Age/  Gender Height Weight IBW Dosing weight  Allergy Information   65 y.o./female 5' 3\" (160 cm) 260 lb (117.9 kg)     Ideal body weight: 52.4 kg (115 lb 8.3 oz)  Adjusted ideal body weight: 78.6 kg (173 lb 5 oz)  117.9 kg  Statins      Temp (24hrs), Av.2 °F (36.8 °C), Min:98.2 °F (36.8 °C), Max:98.2 °F (36.8 °C)          Date  WBC BUN SCr CrCl  (mL/min) Drug/Dose Time   Given Level(s)   (Time) Comments   9:21 PM 7.7 27 0.8  85 mL/min   Vancomycin 2000 mg IV x 1  n/a n/a ED dose not yet administered                                        No intake or output data in the 24 hours ending 21    Historical Cultures:  Organism   Date Value Ref Range Status   2021 Escherichia coli (A)  Final     No results for input(s): BC in the last 72 hours. Cultures:  available culture and sensitivity results were reviewed in [facility]    Assessment:  · 77 y.o. female has been initiated Vancomycin.   · Estimated CrCl = 85 mL/min  · Goal trough level = 15-20 mcg/mL    Plan:  · Will initiate vancomycin at a dose of 2000 mg IV q12h  · Monitor renal function   · Clinical pharmacy to follow      Urbano Willard PharmD, RPh, BCPS 2021 9:27 PM

## 2021-07-06 NOTE — PROGRESS NOTES
Pharmacy Consultation Note  (Antibiotic Dosing and Monitoring)    Initial consult date:    Consulting physician/provider: Franklyn Tinajero and Jose Martin ()  Drug(s): vancomycin   Indication: CSSSI; B/L LE cellulitis  Age/Gender IBW DW  Allergy Information   77 y.o. female; 160 cm, 117.9 kg 52.9 kg 78.9 kg  Statins           Date  WBC BUN/CR Drug/Dose Time   Given Level(s)   (Time) Comments    7.7 27/0.8 X X      7.8 23/0.7 vanco 2000 mg q12h 1010, <2200>     77     Random level in AM               Estimated Creatinine Clearance: 97 mL/min (based on SCr of 0.7 mg/dL). Intake/Output Summary (Last 24 hours) at 2021 1241  Last data filed at 2021 0504  Gross per 24 hour   Intake --   Output 800 ml   Net -800 ml     Temp max: Temp (24hrs), Av.2 °F (36.8 °C), Min:98.2 °F (36.8 °C), Max:98.2 °F (36.8 °C)    Assessment:  · Consulted by Dr. Lola Solorio to dose/monitor vancomycin. · Goal trough level:  15-20 mCg/mL; AUC/MALKA = 400-600 mg/L-hr. · 64 yo/F admitted for B/L LE pain, swelling, and redness. Has B/L open wounds, RLE wound is draining. · PMH: ESBL pyelonepritis/bacteremia in Apr-May (ertapenem). · Empiric ATBs (vanco and meropenem) started on this admission. · ID consulted and wants Pharmacy to continue the consult (d/w Dr. Minerva Valencia today. Plan:  · Continue vancomycin 2000 mg q12h for now: predicted AUC/MALKA = 1097, Tr = 33.7 mCg/mL. · Will check vancomycin random level on 7 AM and update [facility] Rx data and adjust vanco dose if needed. · Pharmacist will follow and monitor/adjust dosing as necessary.     Roseann Ohara, PharmD  2021  12:41 PM

## 2021-07-06 NOTE — CONSULTS
Department of Internal Medicine  Infectious Diseases   Consult Note      Reason for Consult:  Chronic lymphedema, leg wound infection     Requesting Physician:  Dr Bebo Ortiz:                Pt is known to me . This is a 77 yrs old female with hx of morbid obesity, CHF, COPD , oxygen dependent 3 L oxygen,  ESBL E coli UTI,chronic lower ext lymphedema  presented to the ER with bilateral leg wounds, pain and drainage for 2 days . She denied fever, chills or rigors . WBC was 7.7 K   She was given meropenem and vancomycin     Past Medical History:      Past Medical History:   Diagnosis Date    (HFpEF) heart failure with preserved ejection fraction ([facility] [address]     1/16/2018- echo- LVEF 55-65%    Anal fissure     Anemia 10/6/2017    Anxiety and depression     Arthritis     Asthma     Atrial fibrillation (HCC)     Eliquis    Blood transfusion     long time no reaction    CHF (congestive heart failure) ([facility])     Diastolic    COPD (chronic obstructive pulmonary disease) ([facility])     Disc disorder     DM2 (diabetes mellitus, type 2) ([facility] [address]     on insulin    Fall due to stumbling 10/6/2017    GERD (gastroesophageal reflux disease)     History of blood transfusion     Hx of blood clots     Hyperlipidemia     Hypertension     Inappropriate sinus tachycardia     LVH (left ventricular hypertrophy)     moderate    Lymphadenitis, chronic 4/13/2018    Occipital neuralgia 11/2/2011    Respiratory acidosis 10/7/2017    Sinus tachycardia 2/16/2015       Past Surgical History:      Past Surgical History:   Procedure Laterality Date    ANOSCOPY  10/25/2006    injection of anal sphincter with Botox, [place]    ANOSCOPY  4/9/2007    injection of anal sphincter with Botox, Dr. Hedy Campuzano, [address] [place] [address] ANOSCOPY  6/13/2007    injection of anal sphincter with Botox, [place]    ANUS SURGERY  4/4/2006    Lateral sphincterotomy for chronic anal fissure, Dr. Dee Hurtado, vancomycin (VANCOCIN) 2,000 mg in dextrose 5 % 500 mL IVPB  2,000 mg Intravenous Once Kalyan Gar  mL/hr at 07/06/21 1010 2,000 mg at 07/06/21 1010    meropenem (MERREM) 1,000 mg in sodium chloride 0.9 % 100 mL IVPB (mini-bag)  1,000 mg Intravenous Uche Perez MD   Held at 07/06/21 0504    sodium chloride flush 0.9 % injection 5-40 mL  5-40 mL Intravenous 2 times per day Roge Bunch MD        sodium chloride flush 0.9 % injection 5-40 mL  5-40 mL Intravenous PRN Roge Bunch MD        0.9 % sodium chloride infusion  25 mL Intravenous PRN Roge Bunch MD        ondansetron (ZOFRAN-ODT) disintegrating tablet 4 mg  4 mg Oral Q8H PRN Roge Bunch MD        Or    ondansetron (ZOFRAN) injection 4 mg  4 mg Intravenous Q6H PRN Roge Bunch MD        polyethylene glycol (GLYCOLAX) packet 17 g  17 g Oral Daily PRN Roge Bunch MD        acetaminophen (TYLENOL) tablet 650 mg  650 mg Oral Q6H PRN Roge Bunch MD   650 mg at 07/06/21 0102    Or    acetaminophen (TYLENOL) suppository 650 mg  650 mg Rectal Q6H PRN Roge Bunch MD        apixaban (ELIQUIS) tablet 5 mg  5 mg Oral BID Roge Bunch MD   5 mg at 07/06/21 0830    dilTIAZem (CARDIZEM CD) extended release capsule 240 mg  240 mg Oral BID Roge Bunch MD        escitalopram (LEXAPRO) tablet 20 mg  20 mg Oral Daily Roge Bunch MD        ezetimibe (ZETIA) tablet 10 mg  10 mg Oral Nightly Roge Bunch MD        ferrous sulfate (IRON 325) tablet 325 mg  325 mg Oral Daily with breakfast Roge Bunch MD   325 mg at 07/06/21 0831    gabapentin (NEURONTIN) capsule 600 mg  600 mg Oral TID Roge Bunch MD   600 mg at 07/06/21 0830    insulin glargine (LANTUS) injection vial 45 Units  45 Units Subcutaneous BID Roge Bunch MD   45 Units at 07/06/21 0831    pantoprazole (PROTONIX) tablet 40 mg  40 mg Oral QAM AC Roge Bunch MD   40 mg at 07/06/21 0831    metoprolol succinate (TOPROL XL) extended release tablet 100 mg  100 mg Oral BID Liset Enrique MD   100 mg at 07/06/21 0830    miconazole (MICOTIN) 2 % powder   Topical BID Liset Enrique MD        mineral oil-hydrophilic petrolatum (AQUAPHOR) ointment   Topical TID PRN Liset Enrique MD        spironolactone (ALDACTONE) tablet 25 mg  25 mg Oral Daily Liset Enrique MD        traZODone (DESYREL) tablet 50 mg  50 mg Oral Nightly Liset Enrique MD        vancomycin (VANCOCIN) 2,000 mg in dextrose 5 % 500 mL IVPB  2,000 mg Intravenous Q12H Liset Enrique MD         Current Outpatient Medications   Medication Sig Dispense Refill    insulin lispro, 1 Unit Dial, 100 UNIT/ML SOPN INJECT 35 UNITS 3 TIMES A DAY WITH MEALS PLUS SLIDING SCALE 4 pen 3    dilTIAZem (CARDIZEM CD) 240 MG extended release capsule Take 1 capsule by mouth 2 times daily 30 capsule 3    ezetimibe (ZETIA) 10 MG tablet Take 1 tablet by mouth nightly 30 tablet 3    metoprolol succinate (TOPROL XL) 100 MG extended release tablet Take 1 tablet by mouth 2 times daily 30 tablet 3    insulin glargine (LANTUS SOLOSTAR) 100 UNIT/ML injection pen Inject 45 Units into the skin 2 times daily 55 units 2 times a day      miconazole (MICOTIN) 2 % powder Apply topically 2 times daily Apply to breasts and abdominal folds      mineral oil-hydrophilic petrolatum (AQUAPHOR) ointment Apply topically 3 times daily as needed for Dry Skin      acetaminophen (TYLENOL) 500 MG tablet Take 1,000 mg by mouth every 6 hours as needed for Pain      escitalopram (LEXAPRO) 20 MG tablet Take 1 tablet by mouth daily 30 tablet 0    miconazole nitrate 2 % OINT Apply topically 3 times daily as needed (after toileting) 1 Tube 0    apixaban (ELIQUIS) 5 MG TABS tablet Take 5 mg by mouth 2 times daily      bumetanide (BUMEX) 2 MG tablet Take 2 mg by mouth 2 times daily      gabapentin (NEURONTIN) 400 MG capsule Take 600 mg by mouth 3 times daily.        lansoprazole (PREVACID) 30 MG delayed release capsule Take 30 mg by mouth 2 times daily      traZODone (DESYREL) 50 MG tablet Take 50 mg by mouth nightly      vitamin D (ERGOCALCIFEROL) 1.25 MG (73741 UT) CAPS capsule Take 50,000 Units by mouth once a week Given Monday      omega-3 acid ethyl esters (LOVAZA) 1 g capsule Take 2 capsules by mouth 2 times daily 60 capsule 3    spironolactone (ALDACTONE) 25 MG tablet Take 1 tablet by mouth daily 30 tablet 5    ferrous sulfate (IRON 325) 325 (65 Fe) MG tablet Take 1 tablet by mouth daily (with breakfast) 90 tablet 1       Allergies:  Statins    Social History:      Social History     Socioeconomic History    Marital status:      Spouse name: Not on file    Number of children: 3    Years of education: 9    Highest education level: 9th grade   Occupational History    Occupation: SSD   Tobacco Use    Smoking status: Former Smoker     Packs/day: 1.50     Years: 25.00     Pack years: 37.50     Types: Cigarettes     Start date: 1979     Quit date: 2004     Years since quittin.5    Smokeless tobacco: Never Used    Tobacco comment: Stopped smoking 16 years ago   Vaping Use    Vaping Use: Never used   Substance and Sexual Activity    Alcohol use: Not Currently     Comment: 1 can coke daily    Drug use: Not Currently    Sexual activity: Not Currently     Partners: Male   Other Topics Concern    Not on file   Social History Narrative    Denies caffeine. Grandson shops for her and helps around the house    Patient unable to exercise d/t mobility status     Patient lives with her  who has limited speech ability d/t hx of CVA.  He is able to assist patient with IADLS     Social Determinants of Health     Financial Resource Strain:     Difficulty of Paying Living Expenses:    Food Insecurity:     Worried About Running Out of Food in the Last Year:     [address] in the Last Year:    Transportation Needs:     Lack of Transportation (Medical):  Lack of Transportation (Non-Medical):    Physical Activity:     Days of Exercise per Week:     Minutes of Exercise per Session:    Stress:     Feeling of Stress :    Social Connections:     Frequency of Communication with Friends and Family:     Frequency of Social Gatherings with Friends and Family:     Attends [employer] Services:     Active Member of Clubs or Organizations:     Attends Club or Organization Meetings:     Marital Status:    Intimate Partner Violence:     Fear of Current or Ex-Partner:     Emotionally Abused:     Physically Abused:     Sexually Abused:          Family History:     Family History   Problem Relation Age of Onset    Heart Disease Mother     Diabetes Father    Lois Hyatt Father     Other Father         Kia Cuortney Sister     Other Sister         shingles- has had over 1 year    Diabetes Paternal Aunt     Diabetes Paternal Uncle     Diabetes Paternal Aunt     Diabetes Paternal Uncle     Diabetes Sister        REVIEW OF SYSTEMS:    CONSTITUTIONAL:  Denies fever, chill or rigors  HEENT: denies blurring of vision or double vision, denies hearing problem  RESPIRATORY: denies cough, shortness of breath, sputum expectoration, chest pain. CARDIOVASCULAR:  Denies palpitation  GASTROINTESTINAL:  Denies abdomen pain, diarrhea or constipation. GENITOURINARY:  Denies burning urination or frequency of urination  INTEGUMENT: Bilateral leg wounds   HEMATOLOGIC/LYMPHATIC:  Denies lymph node swelling, gum bleeding or easy bruising.   MUSCULOSKELETAL: Bilateral leg swelling, redness, pain   NEUROLOGICAL:  Denies light headed, dizziness      PHYSICAL EXAM:      Vitals:     BP (!) 140/61   Pulse 103   Temp 98.2 °F (36.8 °C) (Temporal)   Resp 22   Ht 5' 3\" (1.6 m)   Wt 260 lb (117.9 kg)   LMP  (LMP Unknown)   SpO2 96%   BMI 46.06 kg/m²     General Appearance:    Awake, alert , morbidly obese    Head:    Normocephalic, atraumatic   Eyes:    No pallor, no icterus,   Ears:    No obvious deformity or drainage.    Nose:   No nasal drainage   Throat:   Mucosa moist, no oral thrush   Neck:   Supple, no lymphadenopathy   Back:     no CVA tenderness   Lungs:     Clear to auscultation bilaterally, no wheeze    Heart:    Regular rate and rhythm, no murmur, rub or gallop   Abdomen:     Soft, non-tender, bowel sounds present    Extremities:  Bilateral chronic lymphedema, wounds, with mod drainage,  Warm to touch , erythema legs    Pulses:   Dorsalis pedis palpable    Skin:              DATA:      CBC with Differential:      Lab Results   Component Value Date    WBC 7.8 07/06/2021    RBC 3.52 07/06/2021    HGB 8.7 07/06/2021    HCT 29.0 07/06/2021     07/06/2021    MCV 82.4 07/06/2021    MCH 24.7 07/06/2021    MCHC 30.0 07/06/2021    RDW 15.9 07/06/2021    NRBC 0.0 12/04/2018    SEGSPCT 85 01/29/2014    METASPCT 3.5 12/04/2018    LYMPHOPCT 16.0 07/06/2021    MONOPCT 6.5 07/06/2021    MYELOPCT 2.6 12/04/2018    BASOPCT 0.4 07/06/2021    MONOSABS 0.51 07/06/2021    LYMPHSABS 1.25 07/06/2021    EOSABS 0.06 07/06/2021    BASOSABS 0.03 07/06/2021       CMP     Lab Results   Component Value Date     07/06/2021    K 4.3 07/06/2021    CL 97 07/06/2021    CO2 32 07/06/2021    BUN 23 07/06/2021    CREATININE 0.7 07/06/2021    GFRAA >60 07/06/2021    LABGLOM >60 07/06/2021    GLUCOSE 370 07/06/2021    GLUCOSE 181 12/16/2011    PROT 7.0 07/05/2021    LABALBU 3.8 07/05/2021    LABALBU 4.5 11/02/2011    CALCIUM 8.8 07/06/2021    BILITOT <0.2 07/05/2021    ALKPHOS 111 07/05/2021    AST 18 07/05/2021    ALT 12 07/05/2021         Hepatic Function Panel:    Lab Results   Component Value Date    ALKPHOS 111 07/05/2021    ALT 12 07/05/2021    AST 18 07/05/2021    PROT 7.0 07/05/2021    BILITOT <0.2 07/05/2021    BILIDIR <0.2 12/26/2019    IBILI see below 12/26/2019    LABALBU 3.8 07/05/2021    LABALBU 4.5 11/02/2011       PT/INR:    Lab Results   Component Value Date PROTIME 19.5 04/29/2021    INR 1.8 04/29/2021       TSH:    Lab Results   Component Value Date    TSH 0.575 01/15/2021       U/A:    Lab Results   Component Value Date    NITRITE negative 07/26/2018    COLORU Yellow 07/06/2021    PHUR 6.0 07/06/2021    WBCUA 1-3 07/06/2021    RBCUA 2-5 07/06/2021    YEAST Present 05/28/2020    BACTERIA RARE 07/06/2021    CLARITYU Clear 07/06/2021    SPECGRAV 1.020 07/06/2021    LEUKOCYTESUR Negative 07/06/2021    UROBILINOGEN 0.2 07/06/2021    BILIRUBINUR Negative 07/06/2021    BILIRUBINUR negative 07/26/2018    BLOODU SMALL 07/06/2021    GLUCOSEU >=1000 07/06/2021    AMORPHOUS FEW 08/14/2020       ABG:  No results found for: QGI3VHL, BEART, C7IPALFW, PHART, THGBART, GXI9XCK, PO2ART, RRK1FPU    MICROBIOLOGY:    Wound Culture - pending       IMPRESSION:     1. Bilateral leg lymphedema, wound infection, cellulitis   2. Morbid obesity         RECOMMENDATIONS:      1. Wound cx   2. Vancomycin 2 grams IV q 12 hrs,   3. Meropenem 1 gram IV q 8 hrs   4. Wound cx   5.  Contact isolation     Thank you Dr Olimpia Maynard for the consult

## 2021-07-06 NOTE — ED NOTES
Pt continues to disregard this RNs warnings and concerns that she will fall. Continues to move herself to the edge of the bed, and not able to pull herself back up into the bed.      Melody Boss RN  07/06/21 2206

## 2021-07-06 NOTE — H&P
Hospital Medicine History & Physical      PCP: Vale Palacios    Date of Admission: 7/5/2021    Date of Service: Pt seen/examined on 7/5/2021 and Admitted to Inpatient with expected LOS greater than two midnights due to medical therapy. Chief Complaint: Leg swelling and redness      History Of Present Illness:      77 y.o. female who presented to [address] [place] [address] with medical history of ESBL bacteremia and infection in leg wound, pyelonephritis, atrial fibrillation on anticoagulation with Eliquis, type 2 diabetes, hep C, ambulatory dysfunction, PFO, iron deficiency anemia, chronic DVT right lower extremity on Eliquis, GI bleed, anemia, CHF with preserved ejection fraction, COPD, asthma, anxiety, depression, per lipidemia, adrenal mass, GERD and chronic respiratory failure on 3 L of oxygen at home. Patient was in the hospital in April to May for respiratory failure, altered mental status, ESBL bacteremia secondary to pyelonephritis, had volume overload to require diuresis, was in A. fib RVR and had CT scan that showed enlarging right adrenal mass and possible mass in the left kidney. Patient was treated for her infection with Invanz. Over the last 2 days, she has been having pain in her legs which she describes as sharp, on and off, 7 out of 10, associated with increasing swelling and redness. She has open wounds in the legs especially the right leg that is weepy. She denies any fever, she has cold chills, no nausea or vomiting. No chest pain, shortness of breath or cough. No dysuria or change in bowel habits. She has not been able to eat or drink much. She states that she has been compliant with her medications except diuretic which she did not take in the last day. Vital signs notable for blood pressure 163/79, saturation of 97% on 3 L. Labs showed CO2 of 32, glucose 266, BNP 21, alkaline phosphatase 111, hemoglobin 9.6, A1c 10.9 in April.   She had an echo in April that showed EF of 60 to 65% with indeterminate diastolic function and moderate pulmonary hypertension. No imaging studies to review tonight. She is being admitted for further management. Past Medical History:          Diagnosis Date    (HFpEF) heart failure with preserved ejection fraction ([facility]     1/16/2018- echo- LVEF 55-65%    Anal fissure     Anemia 10/6/2017    Anxiety and depression     Arthritis     Asthma     Atrial fibrillation (HCC)     Eliquis    Blood transfusion     long time no reaction    CHF (congestive heart failure) (HCC)     Diastolic    COPD (chronic obstructive pulmonary disease) (HCC)     Disc disorder     DM2 (diabetes mellitus, type 2) ([facility]     on insulin    Fall due to stumbling 10/6/2017    GERD (gastroesophageal reflux disease)     History of blood transfusion     Hx of blood clots     Hyperlipidemia     Hypertension     Inappropriate sinus tachycardia     LVH (left ventricular hypertrophy)     moderate    Lymphadenitis, chronic 4/13/2018    Occipital neuralgia 11/2/2011    Respiratory acidosis 10/7/2017    Sinus tachycardia 2/16/2015       Past Surgical History:          Procedure Laterality Date    ANOSCOPY  10/25/2006    injection of anal sphincter with Botox, Franklyn [facility]iz[facility]Timmy, [facility]    ANOSCOPY  4/9/2007    injection of anal sphincter with Botox, Dr. Erika Bullard, [address] [place] [address] ANOSCOPY  6/13/2007    injection of anal sphincter with Botox, [facility]    ANUS SURGERY  4/4/2006    [facility]  [facility]2012    anal exam and injection of Botox 100 units into anal sphincter, L[facility]a [facility], [facility]    APPENDECTOMY  1965   [address]    COLONOSCOPY  1/20/2004    cecal polyp, bx (no pathologic changes), Dr. Evita Melissa, [address] [place] [address] COLONOSCOPY  8/11/2006    snare polypectomy terminal ileum polyp (granulation tissue polyp) and anal polyp (inflammatory/papilla), Dr. Livan Sprague, [facility] COLONOSCOPY  3/23/2012    bx/cauterization proximal ascending colon polyp, injection of anal sphincter with Botox, Dr. Livan Sprague, [facility]    ENDOSCOPY, COLON, DIAGNOSTIC      FRACTURE SURGERY      R leg fracture with surgery for repair    JOINT REPLACEMENT  2003    L knee    PA DEBRIDEMENT, SKIN, SUB-Q TISSUE,MUSCLE,=<20 SQ CM Left 11/30/2018    LEFT LEG EXCISIONAL  DEBRIDEMENT WITH TISSUE BIOPSY performed by Jeffrey Andrade DPM at [address] [place] [address] ENDOSCOPY  1/20/2004    gastritis, bx (no H pylori), Dr. Sheila Tanner, [address] [facility] ENDOSCOPY N/A 6/1/2018    EGD BIOPSY performed by Eden Cooper MD at [facility] 11/9/2018    EGD BIOPSY performed by Eden Cooper MD at [facility] ENDOSCOPY       Medications Prior to Admission:      Prior to Admission medications    Medication Sig Start Date End Date Taking?  Authorizing Provider   insulin lispro, 1 Unit Dial, 100 UNIT/ML SOPN INJECT 35 UNITS 3 TIMES A DAY WITH MEALS PLUS SLIDING SCALE 6/30/21  Yes Muna Sibley MD   dilTIAZem (CARDIZEM CD) 240 MG extended release capsule Take 1 capsule by mouth 2 times daily 5/6/21  Yes Isaías Thompson MD   ezetimibe (ZETIA) 10 MG tablet Take 1 tablet by mouth nightly 5/6/21  Yes Isaías Thompson MD   metoprolol succinate (TOPROL XL) 100 MG extended release tablet Take 1 tablet by mouth 2 times daily 5/5/21  Yes Isaías Thompson MD   insulin glargine (LANTUS SOLOSTAR) 100 UNIT/ML injection pen Inject 45 Units into the skin 2 times daily 55 units 2 times a day   Yes Historical Provider, MD   miconazole (MICOTIN) 2 % powder Apply topically 2 times daily Apply to breasts and abdominal folds   Yes Historical Provider, MD   mineral oil-hydrophilic petrolatum (AQUAPHOR) ointment Apply topically 3 times daily as needed for Dry Skin   Yes Historical Provider, MD   acetaminophen (TYLENOL) 500 MG tablet Take 1,000 mg by mouth every 6 hours as needed for Pain   Yes Historical Provider, MD   escitalopram (LEXAPRO) 20 MG tablet Take 1 tablet by mouth daily 11/27/20  Yes Jan Pinto, DO   miconazole nitrate 2 % OINT Apply topically 3 times daily as needed (after toileting) 11/26/20  Yes Elo Bingham,    apixaban (ELIQUIS) 5 MG TABS tablet Take 5 mg by mouth 2 times daily   Yes Historical Provider, MD   bumetanide (BUMEX) 2 MG tablet Take 2 mg by mouth 2 times daily   Yes Historical Provider, MD   gabapentin (NEURONTIN) 400 MG capsule Take 600 mg by mouth 3 times daily. Yes Historical Provider, MD   lansoprazole (PREVACID) 30 MG delayed release capsule Take 30 mg by mouth 2 times daily   Yes Historical Provider, MD   traZODone (DESYREL) 50 MG tablet Take 50 mg by mouth nightly   Yes Historical Provider, MD   vitamin D (ERGOCALCIFEROL) 1.25 MG (61700 UT) CAPS capsule Take 50,000 Units by mouth once a week Given Monday   Yes Historical Provider, MD   omega-3 acid ethyl esters (LOVAZA) 1 g capsule Take 2 capsules by mouth 2 times daily 9/1/20  Yes Juan Matthew DO   spironolactone (ALDACTONE) 25 MG tablet Take 1 tablet by mouth daily 8/27/20  Yes Juan Matthew DO   ferrous sulfate (IRON 325) 325 (65 Fe) MG tablet Take 1 tablet by mouth daily (with breakfast) 8/25/20  Yes Juan Matthew DO       Allergies:  Statins    Social History:      The patient currently lives at home. TOBACCO:   reports that she quit smoking about 16 years ago. Her smoking use included cigarettes. She started smoking about 41 years ago. She has a 37.50 pack-year smoking history. She has never used smokeless tobacco.  ETOH:   reports previous alcohol use.       Family History:     Reviewed in detail Positive as follows:        Problem Relation Age of Onset    Heart Disease Mother     Diabetes Father     Colon Cancer Father     Other Father         BLINDNESS    Colon Cancer Sister     Other Sister         shingles- has had over 1 year    Diabetes Paternal Aunt     Diabetes Paternal Uncle     Diabetes Paternal Aunt     Diabetes Paternal Uncle     Diabetes Sister        REVIEW OF SYSTEMS:   Pertinent positives as noted in the HPI. All other systems reviewed and negative. PHYSICAL EXAM:    BP (!) 163/79   Pulse 97   Temp 98.2 °F (36.8 °C) (Temporal)   Resp 20   Ht 5' 3\" (1.6 m)   Wt 260 lb (117.9 kg)   LMP  (LMP Unknown)   SpO2 99%   BMI 46.06 kg/m²     General appearance: Elderly female, morbidly obese, mild painful distress, appears stated age and cooperative. Afebrile. HEENT:  Normal cephalic, atraumatic without obvious deformity. Pupils equal, round, and reactive to light. Extra ocular muscles intact. Conjunctivae/corneas clear. Dry oral cavity. Neck: Supple, with full range of motion. No jugular venous distention. Trachea midline. Respiratory:  Normal respiratory effort. Clear to auscultation, bilaterally without Rales/Wheezes/Rhonchi. Cardiovascular:  Regular rate and rhythm with normal S1/S2 without murmurs, rubs or gallops. Abdomen: Soft, non-tender, non-distended with normal bowel sounds. Musculoskeletal:  No clubbing/cyanosis, bilateral lymphedema. Limited range of motion without deformity. Skin: Skin color, texture, turgor normal.  Redness involving both legs, superficial ulcerations of left leg, deep laceration involving the right shin. Skin ulcers are weepy. Neurologic:   Cranial nerves: II-XII intact, grossly non-focal.  Bilateral lower extremity weakness, power 1 out of 5.   Psychiatric:  Alert and oriented, thought content appropriate, normal insight  Capillary Refill: Brisk,< 3 seconds   Peripheral Pulses: DP pulses not palpable due to edema      Labs:     Recent Labs     07/05/21  1620   WBC 7.7   HGB 9.6*   HCT 31.5*        Recent Labs     07/05/21  1620      K 4.9      CO2 32*   BUN 27*   CREATININE 0.8   CALCIUM 9.2     Recent Labs     07/05/21  1620   AST 18   ALT 12   BILITOT <0.2   ALKPHOS 111*     No results for input(s): INR in the last 72 hours. No results for input(s): Karissa Nancear in the last 72 hours. Urinalysis:      Lab Results   Component Value Date    NITRU Negative 04/29/2021    WBCUA 5-10 04/29/2021    BACTERIA MANY 04/29/2021    RBCUA 10-20 04/29/2021    BLOODU LARGE 04/29/2021    SPECGRAV >=1.030 04/29/2021    GLUCOSEU Negative 04/29/2021       Radiology:   Reviewed and documented    XR TIBIA FIBULA RIGHT (2 VIEWS)    (Results Pending)   XR TIBIA FIBULA LEFT (2 VIEWS)    (Results Pending)       ASSESSMENT:    Active Hospital Problems    Diagnosis Date Noted    Anemia [D64.9] 12/16/2018     Priority: High    Chronic anticoagulation [Z79.01] 11/06/2018     Priority: Low    Bilateral cellulitis of lower leg [L03.116, L03.115] 07/05/2021    Ambulatory dysfunction [R26.2] 07/05/2021    Chronic respiratory failure with hypercapnia (HCC) [J96.12] 07/05/2021    Moderate pulmonary hypertension (HCC) [I27.20] 07/05/2021    QT prolongation [R94.31] 07/05/2021    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[facility] [J44.9] 07/05/2021    Infection due to extended-spectrum beta-lactamase-producing Escherichia coli [A49.8, Z16.12] 05/03/2021    Atrial fibrillation ([address] [I48.91] 01/13/2021    Ulcers of both lower legs ([address] [L97.919, L97.929] 01/23/2020    Morbid obesity with BMI of 45.0-49.9, adult ([address] [E66.01, Z68.42]     Lymphedema of both lower extremities [I89.0] 04/13/2018    Physical deconditioning [R53.81] 01/19/2018    Chronic diastolic heart failure ([address] [I50.32] 01/15/2018    Essential hypertension [I10] 04/15/2011    Uncontrolled diabetes mellitus ([address] [E11.65] 11/12/2010         PLAN:  1. Bilateral lower extremity cellulitis, patient recently had ESBL E. coli bacteremia, she has had ESBL in the wound in 2018 as well. Treat with IV Merrem and Vanco.  ID consult. Monitor Vanco levels.   Obtain x-rays of the tibia and fibula bilaterally to rule out osteomyelitis. 2.  Bilateral lower extremity edema, has lymphedema, history of DVT on anticoagulation, may have a component of heart failure as well. She is on Bumex at home which she has not had in the last day. We will treat with IV Lasix. 3.  Heart failure with preserved ejection fraction, possible acute exacerbation, IV Lasix. 4.  Ambulatory dysfunction due to above. Physical therapy as tolerated  5. Atrial fibrillation currently rate controlled, continue rate control agents and anticoagulation. 6.  Anemia, monitor hemoglobin  7. Chronic respiratory failure with hypoxia and hypercapnia, on 3 L at home. Maintaining saturation on this. 8.  QT prolongation on last EKG, obtain EKG. Avoid QT prolonging medications. 9.  Recent ESBL bacteremia. Blood cultures has been repeated. 10.  Leg ulcers, wound care consult. 11.  Hypertension, continue current medications  12. Uncontrolled diabetes with hyperglycemia, sliding scale coverage, monitor blood sugar, last A1c 10.9. 13.  Morbid obesity, dietary and lifestyle modification as tolerated. 14. Moderate pulmonary hypertension. 13.  Debility, physical therapy as tolerated. 16.  Right adrenal mass, patient was seen by endocrinologist during last admission, biopsy recommended. This is yet to be done. She will need to see urology for an eye infection is controlled. Possibly outpatient. DVT Prophylaxis: Eliquis  Diet: ADULT DIET; Regular; 3 carb choices (45 gm/meal)  Code Status: Full Code    PT/OT Eval Status: Evaluation and treatment    Dispo -inpatient/MedSurg       Marycarmen Olson MD    Thank you Boaz Araya for the opportunity to be involved in this patient's care.

## 2021-07-06 NOTE — PROGRESS NOTES
Hospitalist Progress Note      SYNOPSIS: Patient admitted on 7/5/2021     77 y.o. female who presented to [facility] with medical history of ESBL bacteremia and infection in leg wound, pyelonephritis, atrial fibrillation on anticoagulation with Eliquis, type 2 diabetes, hep C, ambulatory dysfunction, PFO, iron deficiency anemia, chronic DVT right lower extremity on Eliquis, GI bleed, anemia, CHF with preserved ejection fraction, COPD, asthma, anxiety, depression, per lipidemia, adrenal mass, GERD and chronic respiratory failure on 3 L of oxygen at home. Patient was in the hospital in April to May for respiratory failure, altered mental status, ESBL bacteremia secondary to pyelonephritis, had volume overload to require diuresis, was in A. fib RVR and had CT scan that showed enlarging right adrenal mass and possible mass in the left kidney. Patient was treated for her infection with Invanz.     Over the last 2 days, she has been having pain in her legs which she describes as sharp, on and off, 7 out of 10, associated with increasing swelling and redness. She has open wounds in the legs especially the right leg that is weepy. She denies any fever, she has cold chills, no nausea or vomiting. No chest pain, shortness of breath or cough. No dysuria or change in bowel habits. She has not been able to eat or drink much. She states that she has been compliant with her medications except diuretic which she did not take in the last day.     Vital signs notable for blood pressure 163/79, saturation of 97% on 3 L. Labs showed CO2 of 32, glucose 266, BNP 21, alkaline phosphatase 111, hemoglobin 9.6, A1c 10.9 in April. She had an echo in April that showed EF of 60 to 65% with indeterminate diastolic function and moderate pulmonary hypertension. X-ray of the right lower extremity shows diffuse soft tissue edema which could be reactive or due to cellulitis.       SUBJECTIVE:    Patient seen and examined  Records reviewed. Stable overnight. No other overnight issues reported. Temp (24hrs), Av.2 °F (36.8 °C), Min:98.2 °F (36.8 °C), Max:98.2 °F (36.8 °C)    DIET: ADULT DIET; Regular; 3 carb choices (45 gm/meal)  CODE: Full Code    Intake/Output Summary (Last 24 hours) at 2021 0740  Last data filed at 2021 0504  Gross per 24 hour   Intake --   Output 800 ml   Net -800 ml       OBJECTIVE:    BP (!) 145/78   Pulse 96   Temp 98.2 °F (36.8 °C) (Temporal)   Resp 24   Ht 5' 3\" (1.6 m)   Wt 260 lb (117.9 kg)   LMP  (LMP Unknown)   SpO2 96%   BMI 46.06 kg/m²     General appearance: No apparent distress, appears stated age and cooperative. HEENT:  Conjunctivae/corneas clear. Neck: Supple. No jugular venous distention. Respiratory: Clear to auscultation bilaterally, normal respiratory effort  Cardiovascular: Regular rate rhythm, normal S1-S2  Abdomen: Soft, nontender, nondistended  Musculoskeletal: No clubbing, cyanosis. Bilateral lower extremity edema  Skin: Erythema of bilateral lower extremities with superficial ulcerations of the left leg. Deep laceration involving the right shin.   Neurologic: awake, alert and following commands     ASSESSMENT:  -Bilateral lower extremity cellulitis  -Bilateral leg lymphedema  -History of HFpEF  -Ambulatory dysfunction  -Chronic atrial fibrillation currently rate controlled  -Chronically dependent on 3 L O2  -Leg ulcers  -Poorly controlled type 2 diabetes  -Hypertension       PLAN:  -Infectious disease consulted  -Continue meropenem and vancomycin for now  -Wound care consulted  -Monitor cultures  -Sliding scale insulin Lantus at night  -Continue home medications      DISPOSITION:     Medications:  REVIEWED DAILY    Infusion Medications    dextrose      sodium chloride       Scheduled Medications    vancomycin  2,000 mg Intravenous Once    insulin lispro  0-10 Units Subcutaneous 4x Daily AC & HS    meropenem  1,000 mg Intravenous Q8H    sodium chloride flush  5-40 mL Intravenous 2 times per day    apixaban  5 mg Oral BID    dilTIAZem  240 mg Oral BID    escitalopram  20 mg Oral Daily    ezetimibe  10 mg Oral Nightly    ferrous sulfate  325 mg Oral Daily with breakfast    gabapentin  600 mg Oral TID    insulin glargine  45 Units Subcutaneous BID    insulin lispro  35 Units Subcutaneous TID WC    pantoprazole  40 mg Oral QAM AC    metoprolol succinate  100 mg Oral BID    miconazole   Topical BID    spironolactone  25 mg Oral Daily    traZODone  50 mg Oral Nightly    furosemide  40 mg Intravenous BID    vancomycin  2,000 mg Intravenous Q12H     PRN Meds: glucose, dextrose, glucagon (rDNA), dextrose, sodium chloride flush, sodium chloride, ondansetron **OR** ondansetron, polyethylene glycol, acetaminophen **OR** acetaminophen, mineral oil-hydrophilic petrolatum    Labs:     Recent Labs     07/05/21  1620 07/06/21  0613   WBC 7.7 7.8   HGB 9.6* 8.7*   HCT 31.5* 29.0*    206       Recent Labs     07/05/21  1620 07/06/21  0613    137   K 4.9 4.3    97*   CO2 32* 32*   BUN 27* 23   CREATININE 0.8 0.7   CALCIUM 9.2 8.8       Recent Labs     07/05/21  1620   PROT 7.0   ALKPHOS 111*   ALT 12   AST 18   BILITOT <0.2       No results for input(s): INR in the last 72 hours. No results for input(s): Brenton Snowball in the last 72 hours. Chronic labs:    Lab Results   Component Value Date    CHOL 355 (H) 01/16/2021    TRIG 298 (H) 01/16/2021    HDL 52 01/16/2021    LDLCALC 243 (H) 01/16/2021    TSH 0.575 01/15/2021    INR 1.8 04/29/2021    LABA1C 10.9 (H) 04/30/2021       Radiology: REVIEWED DAILY    +++++++++++++++++++++++++++++++++++++++++++++++++  DO Barbara Castillo Physician - Hospitalist  [address], [place]  +++++++++++++++++++++++++++++++++++++++++++++++++  NOTE: This report was transcribed using voice recognition software.  Every effort was made to ensure accuracy; however, inadvertent computerized transcription errors may be present.

## 2021-07-07 LAB
ALBUMIN SERPL-MCNC: 3.5 G/DL (ref 3.5–5.2)
ALP BLD-CCNC: 94 U/L (ref 35–104)
ALT SERPL-CCNC: 9 U/L (ref 0–32)
ANION GAP SERPL CALCULATED.3IONS-SCNC: 6 MMOL/L (ref 7–16)
AST SERPL-CCNC: 8 U/L (ref 0–31)
BASOPHILS ABSOLUTE: 0.02 E9/L (ref 0–0.2)
BASOPHILS RELATIVE PERCENT: 0.3 % (ref 0–2)
BILIRUB SERPL-MCNC: 0.2 MG/DL (ref 0–1.2)
BUN BLDV-MCNC: 21 MG/DL (ref 6–23)
CALCIUM SERPL-MCNC: 9 MG/DL (ref 8.6–10.2)
CHLORIDE BLD-SCNC: 97 MMOL/L (ref 98–107)
CO2: 35 MMOL/L (ref 22–29)
CREAT SERPL-MCNC: 0.8 MG/DL (ref 0.5–1)
EOSINOPHILS ABSOLUTE: 0.15 E9/L (ref 0.05–0.5)
EOSINOPHILS RELATIVE PERCENT: 2.6 % (ref 0–6)
GFR AFRICAN AMERICAN: >60
GFR NON-AFRICAN AMERICAN: >60 ML/MIN/1.73
GLUCOSE BLD-MCNC: 229 MG/DL (ref 74–99)
HCT VFR BLD CALC: 30.8 % (ref 34–48)
HEMOGLOBIN: 9.1 G/DL (ref 11.5–15.5)
IMMATURE GRANULOCYTES #: 0.02 E9/L
IMMATURE GRANULOCYTES %: 0.3 % (ref 0–5)
LYMPHOCYTES ABSOLUTE: 1.46 E9/L (ref 1.5–4)
LYMPHOCYTES RELATIVE PERCENT: 25.2 % (ref 20–42)
MAGNESIUM: 1.8 MG/DL (ref 1.6–2.6)
MCH RBC QN AUTO: 25 PG (ref 26–35)
MCHC RBC AUTO-ENTMCNC: 29.5 % (ref 32–34.5)
MCV RBC AUTO: 84.6 FL (ref 80–99.9)
METER GLUCOSE: 244 MG/DL (ref 74–99)
METER GLUCOSE: 292 MG/DL (ref 74–99)
METER GLUCOSE: 306 MG/DL (ref 74–99)
METER GLUCOSE: 332 MG/DL (ref 74–99)
MONOCYTES ABSOLUTE: 0.47 E9/L (ref 0.1–0.95)
MONOCYTES RELATIVE PERCENT: 8.1 % (ref 2–12)
NEUTROPHILS ABSOLUTE: 3.67 E9/L (ref 1.8–7.3)
NEUTROPHILS RELATIVE PERCENT: 63.5 % (ref 43–80)
PDW BLD-RTO: 15.7 FL (ref 11.5–15)
PLATELET # BLD: 202 E9/L (ref 130–450)
PMV BLD AUTO: 9.7 FL (ref 7–12)
POTASSIUM SERPL-SCNC: 3.9 MMOL/L (ref 3.5–5)
RBC # BLD: 3.64 E12/L (ref 3.5–5.5)
SODIUM BLD-SCNC: 138 MMOL/L (ref 132–146)
TOTAL PROTEIN: 6.1 G/DL (ref 6.4–8.3)
VANCOMYCIN RANDOM: 19.9 MCG/ML (ref 5–40)
WBC # BLD: 5.8 E9/L (ref 4.5–11.5)

## 2021-07-07 PROCEDURE — 6370000000 HC RX 637 (ALT 250 FOR IP): Performed by: FAMILY MEDICINE

## 2021-07-07 PROCEDURE — 80202 ASSAY OF VANCOMYCIN: CPT

## 2021-07-07 PROCEDURE — 80053 COMPREHEN METABOLIC PANEL: CPT

## 2021-07-07 PROCEDURE — 94640 AIRWAY INHALATION TREATMENT: CPT

## 2021-07-07 PROCEDURE — 6360000002 HC RX W HCPCS

## 2021-07-07 PROCEDURE — 83735 ASSAY OF MAGNESIUM: CPT

## 2021-07-07 PROCEDURE — 1200000000 HC SEMI PRIVATE

## 2021-07-07 PROCEDURE — 82962 GLUCOSE BLOOD TEST: CPT

## 2021-07-07 PROCEDURE — 6360000002 HC RX W HCPCS: Performed by: FAMILY MEDICINE

## 2021-07-07 PROCEDURE — 36415 COLL VENOUS BLD VENIPUNCTURE: CPT

## 2021-07-07 PROCEDURE — 2580000003 HC RX 258: Performed by: FAMILY MEDICINE

## 2021-07-07 PROCEDURE — 2700000000 HC OXYGEN THERAPY PER DAY

## 2021-07-07 PROCEDURE — 85025 COMPLETE CBC W/AUTO DIFF WBC: CPT

## 2021-07-07 RX ORDER — IPRATROPIUM BROMIDE AND ALBUTEROL SULFATE 2.5; .5 MG/3ML; MG/3ML
1 SOLUTION RESPIRATORY (INHALATION)
Status: DISCONTINUED | OUTPATIENT
Start: 2021-07-07 | End: 2021-07-13 | Stop reason: HOSPADM

## 2021-07-07 RX ORDER — HYDROXYZINE PAMOATE 50 MG/1
50 CAPSULE ORAL EVERY 4 HOURS PRN
Status: DISCONTINUED | OUTPATIENT
Start: 2021-07-07 | End: 2021-07-13 | Stop reason: HOSPADM

## 2021-07-07 RX ADMIN — OXYCODONE AND ACETAMINOPHEN 1 TABLET: 5; 325 TABLET ORAL at 11:44

## 2021-07-07 RX ADMIN — MEROPENEM 1000 MG: 1 INJECTION, POWDER, FOR SOLUTION INTRAVENOUS at 05:57

## 2021-07-07 RX ADMIN — MEROPENEM 1000 MG: 1 INJECTION, POWDER, FOR SOLUTION INTRAVENOUS at 11:44

## 2021-07-07 RX ADMIN — SODIUM CHLORIDE, PRESERVATIVE FREE 10 ML: 5 INJECTION INTRAVENOUS at 20:58

## 2021-07-07 RX ADMIN — OXYCODONE AND ACETAMINOPHEN 1 TABLET: 5; 325 TABLET ORAL at 20:57

## 2021-07-07 RX ADMIN — MEROPENEM 1000 MG: 1 INJECTION, POWDER, FOR SOLUTION INTRAVENOUS at 20:58

## 2021-07-07 RX ADMIN — GABAPENTIN 600 MG: 300 CAPSULE ORAL at 20:57

## 2021-07-07 RX ADMIN — MICONAZOLE NITRATE: 20.6 POWDER TOPICAL at 21:00

## 2021-07-07 RX ADMIN — MICONAZOLE NITRATE: 20.6 POWDER TOPICAL at 11:45

## 2021-07-07 RX ADMIN — SPIRONOLACTONE 25 MG: 25 TABLET ORAL at 10:44

## 2021-07-07 RX ADMIN — INSULIN LISPRO 5 UNITS: 100 INJECTION, SOLUTION INTRAVENOUS; SUBCUTANEOUS at 20:49

## 2021-07-07 RX ADMIN — GABAPENTIN 600 MG: 300 CAPSULE ORAL at 09:04

## 2021-07-07 RX ADMIN — TRAZODONE HYDROCHLORIDE 50 MG: 50 TABLET ORAL at 20:57

## 2021-07-07 RX ADMIN — APIXABAN 5 MG: 5 TABLET, FILM COATED ORAL at 09:05

## 2021-07-07 RX ADMIN — EZETIMIBE 10 MG: 10 TABLET ORAL at 20:57

## 2021-07-07 RX ADMIN — DILTIAZEM HYDROCHLORIDE 240 MG: 240 CAPSULE, EXTENDED RELEASE ORAL at 20:57

## 2021-07-07 RX ADMIN — FERROUS SULFATE TAB 325 MG (65 MG ELEMENTAL FE) 325 MG: 325 (65 FE) TAB at 09:05

## 2021-07-07 RX ADMIN — PETROLATUM: 420 OINTMENT TOPICAL at 11:46

## 2021-07-07 RX ADMIN — GABAPENTIN 600 MG: 300 CAPSULE ORAL at 13:20

## 2021-07-07 RX ADMIN — INSULIN LISPRO 12 UNITS: 100 INJECTION, SOLUTION INTRAVENOUS; SUBCUTANEOUS at 18:17

## 2021-07-07 RX ADMIN — ESCITALOPRAM 20 MG: 10 TABLET, FILM COATED ORAL at 09:05

## 2021-07-07 RX ADMIN — INSULIN GLARGINE 45 UNITS: 100 INJECTION, SOLUTION SUBCUTANEOUS at 09:06

## 2021-07-07 RX ADMIN — INSULIN GLARGINE 45 UNITS: 100 INJECTION, SOLUTION SUBCUTANEOUS at 20:49

## 2021-07-07 RX ADMIN — PANTOPRAZOLE SODIUM 40 MG: 40 TABLET, DELAYED RELEASE ORAL at 06:03

## 2021-07-07 RX ADMIN — INSULIN LISPRO 12 UNITS: 100 INJECTION, SOLUTION INTRAVENOUS; SUBCUTANEOUS at 12:23

## 2021-07-07 RX ADMIN — DILTIAZEM HYDROCHLORIDE 240 MG: 240 CAPSULE, EXTENDED RELEASE ORAL at 09:05

## 2021-07-07 RX ADMIN — Medication 1500 MG: at 22:11

## 2021-07-07 RX ADMIN — PETROLATUM: 420 OINTMENT TOPICAL at 11:45

## 2021-07-07 RX ADMIN — METOPROLOL SUCCINATE 100 MG: 100 TABLET, EXTENDED RELEASE ORAL at 08:44

## 2021-07-07 RX ADMIN — OXYCODONE AND ACETAMINOPHEN 1 TABLET: 5; 325 TABLET ORAL at 06:26

## 2021-07-07 RX ADMIN — IPRATROPIUM BROMIDE AND ALBUTEROL SULFATE 1 AMPULE: 2.5; .5 SOLUTION RESPIRATORY (INHALATION) at 11:40

## 2021-07-07 RX ADMIN — INSULIN LISPRO 6 UNITS: 100 INJECTION, SOLUTION INTRAVENOUS; SUBCUTANEOUS at 08:06

## 2021-07-07 RX ADMIN — APIXABAN 5 MG: 5 TABLET, FILM COATED ORAL at 20:57

## 2021-07-07 RX ADMIN — METOPROLOL SUCCINATE 100 MG: 100 TABLET, EXTENDED RELEASE ORAL at 20:57

## 2021-07-07 RX ADMIN — IPRATROPIUM BROMIDE AND ALBUTEROL SULFATE 1 AMPULE: 2.5; .5 SOLUTION RESPIRATORY (INHALATION) at 19:53

## 2021-07-07 ASSESSMENT — PAIN SCALES - GENERAL
PAINLEVEL_OUTOF10: 0
PAINLEVEL_OUTOF10: 7
PAINLEVEL_OUTOF10: 7
PAINLEVEL_OUTOF10: 8
PAINLEVEL_OUTOF10: 7

## 2021-07-07 NOTE — CARE COORDINATION
Patient with medical history of ESBL bacteremia and infection in leg wound, pyelonephritis, atrial fibrillation on anticoagulation with Eliquis, type 2 diabetes, hep C, ambulatory dysfunction, PFO, iron deficiency anemia, chronic DVT right lower extremity on Eliquis, GI bleed, anemia, CHF with preserved ejection fraction, COPD, asthma, anxiety, depression, per lipidemia, adrenal mass, GERD and chronic respiratory failure on 3 L of oxygen at home is admitted with Bilateral leg lymphedema, wound infection, cellulitis. ID is following. She is currently on IV Vanc and IV Merrem. PT/OT evals ordered to assist with transition of care determination.   Derrick Howard RN CM

## 2021-07-07 NOTE — PROGRESS NOTES
Hospitalist Progress Note      SYNOPSIS: Patient admitted on 7/5/2021     77 y.o. female who presented to [facility] with medical history of ESBL bacteremia and infection in leg wound, pyelonephritis, atrial fibrillation on anticoagulation with Eliquis, type 2 diabetes, hep C, ambulatory dysfunction, PFO, iron deficiency anemia, chronic DVT right lower extremity on Eliquis, GI bleed, anemia, CHF with preserved ejection fraction, COPD, asthma, anxiety, depression, per lipidemia, adrenal mass, GERD and chronic respiratory failure on 3 L of oxygen at home. Patient was in the hospital in April to May for respiratory failure, altered mental status, ESBL bacteremia secondary to pyelonephritis, had volume overload to require diuresis, was in A. fib RVR and had CT scan that showed enlarging right adrenal mass and possible mass in the left kidney. Patient was treated for her infection with Invanz.     Over the last 2 days, she has been having pain in her legs which she describes as sharp, on and off, 7 out of 10, associated with increasing swelling and redness. She has open wounds in the legs especially the right leg that is weepy. She denies any fever, she has cold chills, no nausea or vomiting. No chest pain, shortness of breath or cough. No dysuria or change in bowel habits. She has not been able to eat or drink much. She states that she has been compliant with her medications except diuretic which she did not take in the last day.     Vital signs notable for blood pressure 163/79, saturation of 97% on 3 L. Labs showed CO2 of 32, glucose 266, BNP 21, alkaline phosphatase 111, hemoglobin 9.6, A1c 10.9 in April. She had an echo in April that showed EF of 60 to 65% with indeterminate diastolic function and moderate pulmonary hypertension. X-ray of the right lower extremity shows diffuse soft tissue edema which could be reactive or due to cellulitis.       SUBJECTIVE:    Patient seen and examined  Records reviewed. Stable overnight. No other overnight issues reported. Temp (24hrs), Av.1 °F (36.7 °C), Min:98 °F (36.7 °C), Max:98.2 °F (36.8 °C)    DIET: ADULT DIET; Regular; 3 carb choices (45 gm/meal)  CODE: Full Code    Intake/Output Summary (Last 24 hours) at 2021 0939  Last data filed at 2021 5295  Gross per 24 hour   Intake 360 ml   Output 2025 ml   Net -1665 ml       OBJECTIVE:    /64   Pulse 103   Temp 98.2 °F (36.8 °C) (Temporal)   Resp 20   Ht 5' 3\" (1.6 m)   Wt 294 lb 6.4 oz (133.5 kg)   LMP  (LMP Unknown)   SpO2 99%   BMI 52.15 kg/m²     General appearance: No apparent distress, appears stated age and cooperative. HEENT:  Conjunctivae/corneas clear. Neck: Supple. No jugular venous distention. Respiratory: Clear to auscultation bilaterally, normal respiratory effort  Cardiovascular: Regular rate rhythm, normal S1-S2  Abdomen: Soft, nontender, nondistended  Musculoskeletal: No clubbing, cyanosis. Bilateral lower extremity edema  Skin: Erythema of bilateral lower extremities with superficial ulcerations of the left leg. Deep laceration involving the right shin.   Neurologic: awake, alert and following commands     ASSESSMENT:  -Bilateral lower extremity cellulitis  -Bilateral leg lymphedema  -History of HFpEF  -Ambulatory dysfunction  -Chronic atrial fibrillation currently rate controlled  -Chronically dependent on 3 L O2  -Leg ulcers  -Poorly controlled type 2 diabetes  -Hypertension       PLAN:  -Infectious disease following  -Continue meropenem and vancomycin  -Wound care consulted  -Monitor cultures  -Sliding scale insulin Lantus at night  -Continue home medications      DISPOSITION:     Medications:  REVIEWED DAILY    Infusion Medications    dextrose      sodium chloride       Scheduled Medications    vancomycin  1,500 mg Intravenous Q24H    insulin lispro  0-18 Units Subcutaneous TID WC    insulin lispro  0-9 Units Subcutaneous Nightly    meropenem  1,000 mg Intravenous Q8H    sodium chloride flush  5-40 mL Intravenous 2 times per day    apixaban  5 mg Oral BID    dilTIAZem  240 mg Oral BID    escitalopram  20 mg Oral Daily    ezetimibe  10 mg Oral Nightly    ferrous sulfate  325 mg Oral Daily with breakfast    gabapentin  600 mg Oral TID    insulin glargine  45 Units Subcutaneous BID    pantoprazole  40 mg Oral QAM AC    metoprolol succinate  100 mg Oral BID    miconazole   Topical BID    spironolactone  25 mg Oral Daily    traZODone  50 mg Oral Nightly     PRN Meds: glucose, dextrose, glucagon (rDNA), dextrose, ipratropium-albuterol, oxyCODONE-acetaminophen, sodium chloride flush, sodium chloride, ondansetron **OR** ondansetron, polyethylene glycol, acetaminophen **OR** acetaminophen, white petrolatum    Labs:     Recent Labs     07/05/21  1620 07/06/21  0613 07/07/21  0618   WBC 7.7 7.8 5.8   HGB 9.6* 8.7* 9.1*   HCT 31.5* 29.0* 30.8*    206 202       Recent Labs     07/05/21  1620 07/06/21  0613 07/07/21  0618    137 138   K 4.9 4.3 3.9    97* 97*   CO2 32* 32* 35*   BUN 27* 23 21   CREATININE 0.8 0.7 0.8   CALCIUM 9.2 8.8 9.0       Recent Labs     07/05/21  1620 07/07/21  0618   PROT 7.0 6.1*   ALKPHOS 111* 94   ALT 12 9   AST 18 8   BILITOT <0.2 0.2       No results for input(s): INR in the last 72 hours. No results for input(s): [place] Dec in the last 72 hours. Chronic labs:    Lab Results   Component Value Date    CHOL 355 (H) 01/16/2021    TRIG 298 (H) 01/16/2021    HDL 52 01/16/2021    LDLCALC 243 (H) 01/16/2021    TSH 0.575 01/15/2021    INR 1.8 04/29/2021    LABA1C 7.9 (H) 07/06/2021       Radiology: REVIEWED DAILY    +++++++++++++++++++++++++++++++++++++++++++++++++  DO Barbara Urban Physician - [address] [place], [place]  +++++++++++++++++++++++++++++++++++++++++++++++++  NOTE: This report was transcribed using voice recognition software.  Every effort was made to ensure accuracy; however, inadvertent computerized transcription errors may be present.

## 2021-07-07 NOTE — PROGRESS NOTES
Department of Internal Medicine  Infectious Diseases  Progress  Note    C/C :  Chronic lymphedema, leg wound infection     Denies fever or chills  Reports leg pain, and drainage  Afebrile       Current Facility-Administered Medications   Medication Dose Route Frequency Provider Last Rate Last Admin    vancomycin 1500 mg in dextrose 5% 300 mL IVPB  1,500 mg Intravenous Q24H Vik Juarez ROSY        ipratropium-albuterol (DUONEB) nebulizer solution 1 ampule  1 ampule Inhalation Q4H WA Robbin Muse DO   1 ampule at 07/07/21 1140    white petrolatum ointment   Topical Daily Robbin Muse DO   Given at 07/07/21 1146    glucose (GLUTOSE) 40 % oral gel 15 g  15 g Oral PRN Robbin Muse, DO        dextrose 50 % IV solution  12.5 g Intravenous PRN Robbin Muse, DO        glucagon (rDNA) injection 1 mg  1 mg Intramuscular PRN Robbin Muse, DO        dextrose 5 % solution  100 mL/hr Intravenous PRN Robbin Muse, DO        insulin lispro (HUMALOG) injection vial 0-18 Units  0-18 Units Subcutaneous TID WC Robbin Muse DO   6 Units at 07/07/21 0806    insulin lispro (HUMALOG) injection vial 0-9 Units  0-9 Units Subcutaneous Nightly Robbin Muse, DO   3 Units at 07/06/21 2136    oxyCODONE-acetaminophen (PERCOCET) 5-325 MG per tablet 1 tablet  1 tablet Oral Q4H PRN Pablo Wallace MD   1 tablet at 07/07/21 1144    meropenem (MERREM) 1,000 mg in sodium chloride 0.9 % 100 mL IVPB (mini-bag)  1,000 mg Intravenous Anastacia Taylor MD   Stopped at 07/07/21 1221    sodium chloride flush 0.9 % injection 5-40 mL  5-40 mL Intravenous 2 times per day Pablo Wallace MD   10 mL at 07/06/21 2139    sodium chloride flush 0.9 % injection 5-40 mL  5-40 mL Intravenous PRN Pablo Wallace MD        0.9 % sodium chloride infusion  25 mL Intravenous PRN Pablo Wallace MD        ondansetron (ZOFRAN-ODT) disintegrating tablet 4 mg  4 mg Oral Q8H PRN Pablo Wallace MD        Or    ondansetron (ZOFRAN) injection 4 mg  4 mg Intravenous Q6H PRN Radha Limon MD        polyethylene glycol (GLYCOLAX) packet 17 g  17 g Oral Daily PRN Radha Limon MD        acetaminophen (TYLENOL) tablet 650 mg  650 mg Oral Q6H PRN Radha Limon MD   650 mg at 07/06/21 2133    Or    acetaminophen (TYLENOL) suppository 650 mg  650 mg Rectal Q6H PRN Radha Limon MD        apixaban (ELIQUIS) tablet 5 mg  5 mg Oral BID Radha Limon MD   5 mg at 07/07/21 0905    dilTIAZem (CARDIZEM CD) extended release capsule 240 mg  240 mg Oral BID Radha Limon MD   240 mg at 07/07/21 0905    escitalopram (LEXAPRO) tablet 20 mg  20 mg Oral Daily Radha Limon MD   20 mg at 07/07/21 0905    ezetimibe (ZETIA) tablet 10 mg  10 mg Oral Nightly Radha Limon MD   10 mg at 07/06/21 2159    ferrous sulfate (IRON 325) tablet 325 mg  325 mg Oral Daily with breakfast Radha Limon MD   325 mg at 07/07/21 0905    gabapentin (NEURONTIN) capsule 600 mg  600 mg Oral TID Radha Limon MD   600 mg at 07/07/21 1320    insulin glargine (LANTUS) injection vial 45 Units  45 Units Subcutaneous BID Radha Lmion MD   45 Units at 07/07/21 0906    pantoprazole (PROTONIX) tablet 40 mg  40 mg Oral QAM AC Radha Limon MD   40 mg at 07/07/21 0603    metoprolol succinate (TOPROL XL) extended release tablet 100 mg  100 mg Oral BID Radha Limon MD   100 mg at 07/07/21 0844    miconazole (MICOTIN) 2 % powder   Topical BID Radha Limon MD   Given at 07/07/21 1145    white petrolatum ointment   Topical TID PRN Radha Limon MD   Given at 07/07/21 1145    spironolactone (ALDACTONE) tablet 25 mg  25 mg Oral Daily Radha Limon MD   25 mg at 07/07/21 1044    traZODone (DESYREL) tablet 50 mg  50 mg Oral Nightly Radha Limon MD   50 mg at 07/06/21 2132       REVIEW OF SYSTEMS:    CONSTITUTIONAL:  Denies fever, chill or rigors  HEENT: denies blurring of vision or double vision, denies hearing problem  RESPIRATORY: denies cough, shortness of breath, sputum expectoration, chest pain. CARDIOVASCULAR:  Denies palpitation  GASTROINTESTINAL:  Denies abdomen pain, diarrhea or constipation. GENITOURINARY:  Denies burning urination or frequency of urination  INTEGUMENT: Bilateral leg wounds   HEMATOLOGIC/LYMPHATIC:  Denies lymph node swelling, gum bleeding or easy bruising. MUSCULOSKELETAL: Bilateral leg swelling, redness, pain   NEUROLOGICAL:  Denies light headed, dizziness      PHYSICAL EXAM:      Vitals:     /64   Pulse 103   Temp 98.2 °F (36.8 °C) (Temporal)   Resp 20   Ht 5' 3\" (1.6 m)   Wt 294 lb 6.4 oz (133.5 kg)   LMP  (LMP Unknown)   SpO2 99%   BMI 52.15 kg/m²     General Appearance:    Awake, alert , morbidly obese    Head:    Normocephalic, atraumatic   Eyes:    No pallor, no icterus,   Ears:    No obvious deformity or drainage.    Nose:   No nasal drainage   Throat:   Mucosa moist, no oral thrush   Neck:   Supple, no lymphadenopathy   Back:     no CVA tenderness   Lungs:     Clear to auscultation bilaterally, no wheeze    Heart:    Regular rate and rhythm, no murmur, rub or gallop   Abdomen:     Soft, non-tender, bowel sounds present    Extremities:  Bilateral chronic lymphedema, wounds, with mod drainage,  Warm to touch , erythema legs    Pulses:   Dorsalis pedis palpable    Skin:              DATA:      CBC with Differential:      Lab Results   Component Value Date    WBC 5.8 07/07/2021    RBC 3.64 07/07/2021    HGB 9.1 07/07/2021    HCT 30.8 07/07/2021     07/07/2021    MCV 84.6 07/07/2021    MCH 25.0 07/07/2021    MCHC 29.5 07/07/2021    RDW 15.7 07/07/2021    NRBC 0.0 12/04/2018    SEGSPCT 85 01/29/2014    METASPCT 3.5 12/04/2018    LYMPHOPCT 25.2 07/07/2021    MONOPCT 8.1 07/07/2021    MYELOPCT 2.6 12/04/2018    BASOPCT 0.3 07/07/2021    MONOSABS 0.47 07/07/2021    LYMPHSABS 1.46 07/07/2021    EOSABS 0.15 07/07/2021    MARY ELLEN 0.02 07/07/2021       CMP     Lab Results   Component Value Date  07/07/2021    K 3.9 07/07/2021    K 4.3 07/06/2021    CL 97 07/07/2021    CO2 35 07/07/2021    BUN 21 07/07/2021    CREATININE 0.8 07/07/2021    GFRAA >60 07/07/2021    LABGLOM >60 07/07/2021    GLUCOSE 229 07/07/2021    GLUCOSE 181 12/16/2011    PROT 6.1 07/07/2021    LABALBU 3.5 07/07/2021    LABALBU 4.5 11/02/2011    CALCIUM 9.0 07/07/2021    BILITOT 0.2 07/07/2021    ALKPHOS 94 07/07/2021    AST 8 07/07/2021    ALT 9 07/07/2021         Hepatic Function Panel:    Lab Results   Component Value Date    ALKPHOS 94 07/07/2021    ALT 9 07/07/2021    AST 8 07/07/2021    PROT 6.1 07/07/2021    BILITOT 0.2 07/07/2021    BILIDIR <0.2 12/26/2019    IBILI see below 12/26/2019    LABALBU 3.5 07/07/2021    LABALBU 4.5 11/02/2011       PT/INR:    Lab Results   Component Value Date    PROTIME 19.5 04/29/2021    INR 1.8 04/29/2021       TSH:    Lab Results   Component Value Date    TSH 0.575 01/15/2021       U/A:    Lab Results   Component Value Date    NITRITE negative 07/26/2018    COLORU Yellow 07/06/2021    PHUR 6.0 07/06/2021    WBCUA 1-3 07/06/2021    RBCUA 2-5 07/06/2021    YEAST Present 05/28/2020    BACTERIA RARE 07/06/2021    CLARITYU Clear 07/06/2021    SPECGRAV 1.020 07/06/2021    LEUKOCYTESUR Negative 07/06/2021    UROBILINOGEN 0.2 07/06/2021    BILIRUBINUR Negative 07/06/2021    BILIRUBINUR negative 07/26/2018    BLOODU SMALL 07/06/2021    GLUCOSEU >=1000 07/06/2021    AMORPHOUS FEW 08/14/2020       ABG:  No results found for: VNQ4WQL, BEART, C1EVQWRQ, PHART, THGBART, RQA3KRL, PO2ART, WPK2HGK    MICROBIOLOGY:    Wound Culture    Growth present, evaluating for:   Mixed Gram negative rods   Proteus species    Narrative:             IMPRESSION:     1. Bilateral leg lymphedema, wound infection, cellulitis   2. Morbid obesity         RECOMMENDATIONS:      1. Wound cx   2. Vancomycin 1500 grams IV q 12 hrs ( clinical pharmacy following )   3. Meropenem 1 gram IV q 8 hrs   4. Wound cx   5.  Contact isolation

## 2021-07-07 NOTE — PROGRESS NOTES
Pharmacy Consultation Note  (Antibiotic Dosing and Monitoring)    Initial consult date:    Consulting physician/provider: Franklyn Tinajero and Jose Martin ()  Drug(s): vancomycin   Indication: CSSSI; B/L LE cellulitis  Age/Gender IBW DW  Allergy Information   77 y.o. female; 160 cm, 117.9 kg 52.9 kg 78.9 kg  Statins           Date  WBC BUN/CR Drug/Dose Time   Given Level(s)   (Time) Comments    7.7 27/0.8 X X      7.8 23/0.7 vanco 2000 mg q12h 1010, 2242      5.8 21/0.8 vanco 1500 mg q24h <2000> Random level = 19.9 mCg/mL @ 0618               Estimated Creatinine Clearance: 91 mL/min (based on SCr of 0.8 mg/dL). Intake/Output Summary (Last 24 hours) at 2021 1032  Last data filed at 2021 5783  Gross per 24 hour   Intake 360 ml   Output 5 ml   Net -1665 ml   UO = 0.6 mL/kg/hr (5 mL)    Temp max: Temp (24hrs), Av.1 °F (36.7 °C), Min:98 °F (36.7 °C), Max:98.2 °F (36.8 °C)    Assessment:  · Consulted by Dr. Ryland Benitez to dose/monitor vancomycin. · Goal trough level:  15-20 mCg/mL; AUC/MALKA = 400-600 mg/L-hr. · 66 yo/F admitted for B/L LE pain, swelling, and redness. Has B/L open wounds, RLE wound is draining. · PMH: ESBL pyelonepritis/bacteremia in Apr-May (ertapenem). · Empiric ATBs (vanco and meropenem) started on this admission. · ID consulted and wants Pharmacy to continue the consult (d/w Dr. Temo Valdez today. · : day #2 vanco, day #3 meropenem. Vanc random level = 19.9 this AM; low UO documented. Afebrile, WBC WNL, BC's NGTD; Wound Cx (): mixed GNR, growing Proteus species. Plan:  · Decrease vancomycin to 1500 mg q24h: predicted AUC/MALKA = 451,   Tr = 14 mCg/mL. · Vanco 2000 mg q12h too high of a dose (predicted AUC/MALKA = 1137, and   Tr = 41.1 mCg/mL). · Pharmacist will follow and monitor/adjust dosing as necessary.     Becca Franco, PharmKATIE  2021  10:32 AM

## 2021-07-07 NOTE — FLOWSHEET NOTE
Inpatient Wound Care(initial evaluation)  [id number]    Admit Date: 7/5/2021  3:42 PM    Reason for consult:  legs    Significant history:    Chief Complaint: Leg swelling and redness  presented to [facility] with medical history of ESBL bacteremia and infection in leg wound, pyelonephritis, atrial fibrillation on anticoagulation with Eliquis, type 2 diabetes, hep C, ambulatory dysfunction, PFO, iron deficiency anemia, chronic DVT right lower extremity on Eliquis, GI bleed, anemia, CHF with preserved ejection fraction, COPD, asthma, anxiety, depression, per lipidemia, adrenal mass, GERD and chronic respiratory failure on 3 L of oxygen at home. Patient was in the hospital in April to May for respiratory failure, altered mental status, ESBL bacteremia secondary to pyelonephritis, had volume overload to require diuresis, was in A. fib RVR and had CT scan that showed enlarging right adrenal mass and possible mass in the left kidney. Patient was treated for her infection with Invanz.     Over the last 2 days, she has been having pain in her legs which she describes as sharp, on and off, 7 out of 10, associated with increasing swelling and redness. She has open wounds in the legs especially the right leg that is weepy. Wound history: admitted with wounds  Follows family physicians    Findings:     07/07/21 1030   Skin Integrity   Skin Integrity   (dry thick layers of skin, vascular discoloration)   Location BLE   Skin Integrity Site 2   Skin Integrity Location 2 Redness; Excoriation   Location 2 under breast and abdominal fold   Wound 07/07/21 Leg Lower;Right   Date First Assessed/Time First Assessed: 07/07/21 1032   Present on Hospital Admission: Yes  Location: Leg  Wound Location Orientation: Lower;Right   Wound Image      Wound Etiology Venous   Dressing/Treatment ABD; Ace wrap;Dry dressing;Alginate;Roll gauze   Wound Length (cm) 15 cm   Wound Width (cm) 30 cm   Wound Depth (cm) 0.2 cm   Wound Surface Area (cm^2) 450 cm^2 Wound Volume (cm^3) 90 cm^3   Wound Assessment Pink/red;Slough; Purple/maroon  (black)   Drainage Amount Small   Drainage Description Serosanguinous   Odor None   Ely-wound Assessment Dry/flaky   Wound Thickness Description not for Pressure Injury Partial thickness   Wound 07/07/21 Leg Left; Lower   Date First Assessed/Time First Assessed: 07/07/21 1030   Present on Hospital Admission: Yes  Location: Leg  Wound Location Orientation: Left; Lower   Wound Image    Wound Etiology Venous   Dressing/Treatment ABD; Ace wrap;Alginate;Dry dressing;Roll gauze   Wound Length (cm) 14 cm   Wound Width (cm) 10 cm   Wound Depth (cm) 0.2 cm   Wound Surface Area (cm^2) 140 cm^2   Wound Volume (cm^3) 28 cm^3   Wound Assessment [place]/red;Slough   Drainage Amount Small   Drainage Description Serosanguinous   Odor None   Ely-wound Assessment Dry/flaky   Wound Thickness Description not for Pressure Injury Partial thickness   Wound 07/07/21 Leg Left;Lateral   Date First Assessed/Time First Assessed: 07/07/21 1030   Location: Leg  Wound Location Orientation: Left;Lateral   Wound Image    Wound Etiology Venous   Dressing/Treatment ABD; Ace wrap;Alginate;Dry dressing;Roll gauze   Wound Length (cm) 4 cm   Wound Width (cm) 4 cm   Wound Depth (cm) 0.1 cm   Wound Surface Area (cm^2) 16 cm^2   Wound Volume (cm^3) 1.6 cm^3   Wound Assessment Pink/red   Drainage Amount Scant   Drainage Description Serosanguinous   Odor None   Ely-wound Assessment Dry/flaky   Wound Thickness Description not for Pressure Injury Partial thickness   right leg contracted  Difficulty moving legs  Unable to elevate legs due to pain  **Informed Consent**    The patient has given verbal consent to have photos taken of wounds and inserted into their chart as part of their permanent medical record for purposes of documentation, treatment management and/or medical review.    All Images taken on 7/7/21 of patient name: Luci Smith were transmitted and stored on secured Epic  Site located within [facility] by a registered Epic-[employer] Mobile Application Device.      Impression:venous ulcers    Plan:  Opticell, dry dressing, ABD, Kerlix, ace wraps  Aquaphor to dry skin  antifungal to folds  Will need continued preventative care  Dietary consult    Briseyda Sutherland RN 7/7/2021 10:44 AM

## 2021-07-08 LAB
ANION GAP SERPL CALCULATED.3IONS-SCNC: 4 MMOL/L (ref 7–16)
BASOPHILS ABSOLUTE: 0.03 E9/L (ref 0–0.2)
BASOPHILS RELATIVE PERCENT: 0.4 % (ref 0–2)
BUN BLDV-MCNC: 27 MG/DL (ref 6–23)
CALCIUM SERPL-MCNC: 9.1 MG/DL (ref 8.6–10.2)
CHLORIDE BLD-SCNC: 94 MMOL/L (ref 98–107)
CO2: 36 MMOL/L (ref 22–29)
CREAT SERPL-MCNC: 1 MG/DL (ref 0.5–1)
EOSINOPHILS ABSOLUTE: 0.13 E9/L (ref 0.05–0.5)
EOSINOPHILS RELATIVE PERCENT: 1.9 % (ref 0–6)
GFR AFRICAN AMERICAN: >60
GFR NON-AFRICAN AMERICAN: 55 ML/MIN/1.73
GLUCOSE BLD-MCNC: 301 MG/DL (ref 74–99)
HCT VFR BLD CALC: 30.5 % (ref 34–48)
HEMOGLOBIN: 9.2 G/DL (ref 11.5–15.5)
IMMATURE GRANULOCYTES #: 0.04 E9/L
IMMATURE GRANULOCYTES %: 0.6 % (ref 0–5)
LYMPHOCYTES ABSOLUTE: 1.39 E9/L (ref 1.5–4)
LYMPHOCYTES RELATIVE PERCENT: 20 % (ref 20–42)
MAGNESIUM: 1.9 MG/DL (ref 1.6–2.6)
MCH RBC QN AUTO: 24.9 PG (ref 26–35)
MCHC RBC AUTO-ENTMCNC: 30.2 % (ref 32–34.5)
MCV RBC AUTO: 82.7 FL (ref 80–99.9)
METER GLUCOSE: 305 MG/DL (ref 74–99)
METER GLUCOSE: 315 MG/DL (ref 74–99)
METER GLUCOSE: 349 MG/DL (ref 74–99)
METER GLUCOSE: 436 MG/DL (ref 74–99)
MONOCYTES ABSOLUTE: 0.53 E9/L (ref 0.1–0.95)
MONOCYTES RELATIVE PERCENT: 7.6 % (ref 2–12)
NEUTROPHILS ABSOLUTE: 4.82 E9/L (ref 1.8–7.3)
NEUTROPHILS RELATIVE PERCENT: 69.5 % (ref 43–80)
PDW BLD-RTO: 15.6 FL (ref 11.5–15)
PLATELET # BLD: 237 E9/L (ref 130–450)
PMV BLD AUTO: 9.9 FL (ref 7–12)
POTASSIUM SERPL-SCNC: 4.8 MMOL/L (ref 3.5–5)
RBC # BLD: 3.69 E12/L (ref 3.5–5.5)
SODIUM BLD-SCNC: 134 MMOL/L (ref 132–146)
WBC # BLD: 6.9 E9/L (ref 4.5–11.5)

## 2021-07-08 PROCEDURE — 82962 GLUCOSE BLOOD TEST: CPT

## 2021-07-08 PROCEDURE — 83735 ASSAY OF MAGNESIUM: CPT

## 2021-07-08 PROCEDURE — 6370000000 HC RX 637 (ALT 250 FOR IP): Performed by: FAMILY MEDICINE

## 2021-07-08 PROCEDURE — 85025 COMPLETE CBC W/AUTO DIFF WBC: CPT

## 2021-07-08 PROCEDURE — 1200000000 HC SEMI PRIVATE

## 2021-07-08 PROCEDURE — 36415 COLL VENOUS BLD VENIPUNCTURE: CPT

## 2021-07-08 PROCEDURE — 80048 BASIC METABOLIC PNL TOTAL CA: CPT

## 2021-07-08 PROCEDURE — 6360000002 HC RX W HCPCS: Performed by: FAMILY MEDICINE

## 2021-07-08 PROCEDURE — 2700000000 HC OXYGEN THERAPY PER DAY

## 2021-07-08 PROCEDURE — 6360000002 HC RX W HCPCS

## 2021-07-08 PROCEDURE — 2580000003 HC RX 258: Performed by: FAMILY MEDICINE

## 2021-07-08 PROCEDURE — 94640 AIRWAY INHALATION TREATMENT: CPT

## 2021-07-08 RX ADMIN — DILTIAZEM HYDROCHLORIDE 240 MG: 240 CAPSULE, EXTENDED RELEASE ORAL at 08:37

## 2021-07-08 RX ADMIN — INSULIN LISPRO 6 UNITS: 100 INJECTION, SOLUTION INTRAVENOUS; SUBCUTANEOUS at 21:29

## 2021-07-08 RX ADMIN — PETROLATUM: 420 OINTMENT TOPICAL at 08:44

## 2021-07-08 RX ADMIN — MICONAZOLE NITRATE: 20.6 POWDER TOPICAL at 08:44

## 2021-07-08 RX ADMIN — Medication 1500 MG: at 22:35

## 2021-07-08 RX ADMIN — APIXABAN 5 MG: 5 TABLET, FILM COATED ORAL at 08:39

## 2021-07-08 RX ADMIN — SODIUM CHLORIDE, PRESERVATIVE FREE 10 ML: 5 INJECTION INTRAVENOUS at 20:53

## 2021-07-08 RX ADMIN — MICONAZOLE NITRATE: 20.6 POWDER TOPICAL at 21:47

## 2021-07-08 RX ADMIN — MEROPENEM 1000 MG: 1 INJECTION, POWDER, FOR SOLUTION INTRAVENOUS at 05:01

## 2021-07-08 RX ADMIN — INSULIN GLARGINE 45 UNITS: 100 INJECTION, SOLUTION SUBCUTANEOUS at 21:29

## 2021-07-08 RX ADMIN — MEROPENEM 1000 MG: 1 INJECTION, POWDER, FOR SOLUTION INTRAVENOUS at 20:34

## 2021-07-08 RX ADMIN — SPIRONOLACTONE 25 MG: 25 TABLET ORAL at 08:38

## 2021-07-08 RX ADMIN — INSULIN LISPRO 18 UNITS: 100 INJECTION, SOLUTION INTRAVENOUS; SUBCUTANEOUS at 17:40

## 2021-07-08 RX ADMIN — APIXABAN 5 MG: 5 TABLET, FILM COATED ORAL at 20:52

## 2021-07-08 RX ADMIN — INSULIN LISPRO 12 UNITS: 100 INJECTION, SOLUTION INTRAVENOUS; SUBCUTANEOUS at 08:40

## 2021-07-08 RX ADMIN — IPRATROPIUM BROMIDE AND ALBUTEROL SULFATE 1 AMPULE: 2.5; .5 SOLUTION RESPIRATORY (INHALATION) at 20:05

## 2021-07-08 RX ADMIN — ACETAMINOPHEN 650 MG: 325 TABLET ORAL at 08:38

## 2021-07-08 RX ADMIN — METOPROLOL SUCCINATE 100 MG: 100 TABLET, EXTENDED RELEASE ORAL at 20:52

## 2021-07-08 RX ADMIN — OXYCODONE AND ACETAMINOPHEN 1 TABLET: 5; 325 TABLET ORAL at 20:59

## 2021-07-08 RX ADMIN — TRAZODONE HYDROCHLORIDE 50 MG: 50 TABLET ORAL at 20:52

## 2021-07-08 RX ADMIN — MEROPENEM 1000 MG: 1 INJECTION, POWDER, FOR SOLUTION INTRAVENOUS at 14:14

## 2021-07-08 RX ADMIN — INSULIN LISPRO 12 UNITS: 100 INJECTION, SOLUTION INTRAVENOUS; SUBCUTANEOUS at 12:04

## 2021-07-08 RX ADMIN — PANTOPRAZOLE SODIUM 40 MG: 40 TABLET, DELAYED RELEASE ORAL at 05:54

## 2021-07-08 RX ADMIN — DILTIAZEM HYDROCHLORIDE 240 MG: 240 CAPSULE, EXTENDED RELEASE ORAL at 20:52

## 2021-07-08 RX ADMIN — OXYCODONE AND ACETAMINOPHEN 1 TABLET: 5; 325 TABLET ORAL at 06:20

## 2021-07-08 RX ADMIN — OXYCODONE AND ACETAMINOPHEN 1 TABLET: 5; 325 TABLET ORAL at 16:10

## 2021-07-08 RX ADMIN — INSULIN GLARGINE 45 UNITS: 100 INJECTION, SOLUTION SUBCUTANEOUS at 08:39

## 2021-07-08 RX ADMIN — METOPROLOL SUCCINATE 100 MG: 100 TABLET, EXTENDED RELEASE ORAL at 08:36

## 2021-07-08 RX ADMIN — IPRATROPIUM BROMIDE AND ALBUTEROL SULFATE 1 AMPULE: 2.5; .5 SOLUTION RESPIRATORY (INHALATION) at 10:38

## 2021-07-08 RX ADMIN — IPRATROPIUM BROMIDE AND ALBUTEROL SULFATE 1 AMPULE: 2.5; .5 SOLUTION RESPIRATORY (INHALATION) at 14:08

## 2021-07-08 RX ADMIN — EZETIMIBE 10 MG: 10 TABLET ORAL at 20:52

## 2021-07-08 RX ADMIN — GABAPENTIN 600 MG: 300 CAPSULE ORAL at 08:39

## 2021-07-08 RX ADMIN — OXYCODONE AND ACETAMINOPHEN 1 TABLET: 5; 325 TABLET ORAL at 12:04

## 2021-07-08 RX ADMIN — ESCITALOPRAM 20 MG: 10 TABLET, FILM COATED ORAL at 08:38

## 2021-07-08 RX ADMIN — GABAPENTIN 600 MG: 300 CAPSULE ORAL at 20:52

## 2021-07-08 RX ADMIN — GABAPENTIN 600 MG: 300 CAPSULE ORAL at 14:13

## 2021-07-08 RX ADMIN — FERROUS SULFATE TAB 325 MG (65 MG ELEMENTAL FE) 325 MG: 325 (65 FE) TAB at 08:38

## 2021-07-08 ASSESSMENT — PAIN SCALES - GENERAL
PAINLEVEL_OUTOF10: 7
PAINLEVEL_OUTOF10: 5
PAINLEVEL_OUTOF10: 5
PAINLEVEL_OUTOF10: 6
PAINLEVEL_OUTOF10: 0
PAINLEVEL_OUTOF10: 7

## 2021-07-08 NOTE — PLAN OF CARE
Problem: Skin Integrity:  Goal: Will show no infection signs and symptoms  Description: Will show no infection signs and symptoms  7/8/2021 0319 by Lilian Epps RN  Outcome: Met This Shift  7/7/2021 2326 by Cali Munroe RN  Outcome: Met This Shift  7/7/2021 1513 by Silver Bella RN  Outcome: Met This Shift  Goal: Absence of new skin breakdown  Description: Absence of new skin breakdown  7/8/2021 0319 by Lilian Epps RN  Outcome: Met This Shift  7/7/2021 2326 by Cali Munroe RN  Outcome: Met This Shift  7/7/2021 1513 by Silver Bella RN  Outcome: Met This Shift     Problem: Falls - Risk of:  Goal: Will remain free from falls  Description: Will remain free from falls  7/8/2021 0319 by Lilian Epps RN  Outcome: Met This Shift  7/7/2021 1513 by Silver Bella RN  Outcome: Met This Shift  Goal: Absence of physical injury  Description: Absence of physical injury  7/8/2021 0319 by Lilian Epps RN  Outcome: Met This Shift  7/7/2021 1513 by Silver Bella RN  Outcome: Met This Shift     Problem: Pain:  Goal: Pain level will decrease  Description: Pain level will decrease  7/8/2021 0319 by Lilian Epps RN  Outcome: Met This Shift  7/7/2021 1513 by Silver Bella RN  Outcome: Met This Shift  Goal: Control of acute pain  Description: Control of acute pain  7/8/2021 0319 by Lilian Epps RN  Outcome: Met This Shift  7/7/2021 1513 by Silver Bella RN  Outcome: Met This Shift  Goal: Control of chronic pain  Description: Control of chronic pain  7/8/2021 0319 by Lilian Epps RN  Outcome: Met This Shift  7/7/2021 1513 by Silver Bella RN  Outcome: Met This Shift

## 2021-07-08 NOTE — CONSULTS
Nutrition Assessment     Type and Reason for Visit: Initial, Consult    Nutrition Recommendations/Plan: Continue current diet, Start Ensure HP & Fabrizio BID     Nutrition Assessment:  Pt adm w/ BLE cellulitis/chronic venous ulcers. Noted hx DM. Will provide ONS and monitor    Malnutrition Assessment:  Malnutrition Status: No malnutrition    Estimated Daily Nutrient Needs:  Energy (kcal):  (MSJ 1838 x 1.2 SF); Weight Used for Energy Requirements:  Current     Protein (g): 115-130 (2.2-2.5); Weight Used for Protein Requirements:  Ideal        Fluid (ml/day): ; Weight Used for Fluid Requirements:  1 ml/kcal      Nutrition Related Findings: pt alert, soft abd, active BS, +4 pitting edema, -I/Os      Current Nutrition Therapies:    ADULT DIET; Regular; 3 carb choices (45 gm/meal)    Anthropometric Measures:  · Height: 5' 3\" (160 cm)  · Current Body Wt: 294 lb (133.4 kg) (bed scale 7/8)   · BMI: 52.1    Nutrition Diagnosis:   · Increased nutrient needs related to increase demand for energy/nutrients as evidenced by wounds      Nutrition Interventions:   Food and/or Nutrient Delivery:  Continue Current Diet, Start Oral Nutrition Supplement (Ensure HP & Fabrizio BID)  Nutrition Education/Counseling:  Education not indicated   Coordination of Nutrition Care:  Continue to monitor while inpatient    Goals:  Consume >75% meals/ONS       Nutrition Monitoring and Evaluation:   Food/Nutrient Intake Outcomes:  Diet Advancement/Tolerance, Food and Nutrient Intake, Supplement Intake  Physical Signs/Symptoms Outcomes:  Biochemical Data, GI Status, Fluid Status or Edema, Nutrition Focused Physical Findings, Skin, Weight     Discharge Planning:     Too soon to determine     Electronically signed by Gene Reyes, MS, RD, LD on 7/8/21 at 1:20 PM EDT    Contact: [phone]

## 2021-07-08 NOTE — PROGRESS NOTES
Hospitalist Progress Note      SYNOPSIS: Patient admitted on 7/5/2021     77 y.o. female who presented to [facility] with medical history of ESBL bacteremia and infection in leg wound, pyelonephritis, atrial fibrillation on anticoagulation with Eliquis, type 2 diabetes, hep C, ambulatory dysfunction, PFO, iron deficiency anemia, chronic DVT right lower extremity on Eliquis, GI bleed, anemia, CHF with preserved ejection fraction, COPD, asthma, anxiety, depression, per lipidemia, adrenal mass, GERD and chronic respiratory failure on 3 L of oxygen at home. Patient was in the hospital in April to May for respiratory failure, altered mental status, ESBL bacteremia secondary to pyelonephritis, had volume overload to require diuresis, was in A. fib RVR and had CT scan that showed enlarging right adrenal mass and possible mass in the left kidney. Patient was treated for her infection with Invanz.     Over the last 2 days, she has been having pain in her legs which she describes as sharp, on and off, 7 out of 10, associated with increasing swelling and redness. She has open wounds in the legs especially the right leg that is weepy. She denies any fever, she has cold chills, no nausea or vomiting. No chest pain, shortness of breath or cough. No dysuria or change in bowel habits. She has not been able to eat or drink much. She states that she has been compliant with her medications except diuretic which she did not take in the last day.     Vital signs notable for blood pressure 163/79, saturation of 97% on 3 L. Labs showed CO2 of 32, glucose 266, BNP 21, alkaline phosphatase 111, hemoglobin 9.6, A1c 10.9 in April. She had an echo in April that showed EF of 60 to 65% with indeterminate diastolic function and moderate pulmonary hypertension. X-ray of the right lower extremity shows diffuse soft tissue edema which could be reactive or due to cellulitis.       SUBJECTIVE:    Patient seen and examined  Records reviewed. Stable overnight. No other overnight issues reported. Temp (24hrs), Av °F (36.7 °C), Min:97.5 °F (36.4 °C), Max:98.4 °F (36.9 °C)    DIET: ADULT DIET; Regular; 3 carb choices (45 gm/meal)  CODE: Full Code    Intake/Output Summary (Last 24 hours) at 2021 0980  Last data filed at 2021 0451  Gross per 24 hour   Intake --   Output 1075 ml   Net -1075 ml       OBJECTIVE:    BP (!) 109/54   Pulse 76   Temp 98 °F (36.7 °C) (Oral)   Resp 15   Ht 5' 3\" (1.6 m)   Wt 294 lb 4.8 oz (133.5 kg)   LMP  (LMP Unknown)   SpO2 99%   BMI 52.13 kg/m²     General appearance: No apparent distress, appears stated age and cooperative. HEENT:  Conjunctivae/corneas clear. Neck: Supple. No jugular venous distention. Respiratory: Clear to auscultation bilaterally, normal respiratory effort  Cardiovascular: Regular rate rhythm, normal S1-S2  Abdomen: Soft, nontender, nondistended  Musculoskeletal: No clubbing, cyanosis. Bilateral lower extremity edema  Skin: Erythema of bilateral lower extremities with superficial ulcerations of the left leg. Deep laceration involving the right shin.   Neurologic: awake, alert and following commands     ASSESSMENT:  -Bilateral lower extremity cellulitis  -Bilateral leg lymphedema  -History of HFpEF  -Ambulatory dysfunction  -Chronic atrial fibrillation currently rate controlled  -Chronically dependent on 3 L O2  -Leg ulcers  -Poorly controlled type 2 diabetes  -Hypertension       PLAN:  -Infectious disease following  -Continue meropenem and vancomycin  -Wound care  -Monitor cultures  -Sliding scale insulin Lantus at night  -Continue home medications      DISPOSITION:     Medications:  REVIEWED DAILY    Infusion Medications    dextrose      sodium chloride       Scheduled Medications    vancomycin  1,500 mg Intravenous Q24H    ipratropium-albuterol  1 ampule Inhalation Q4H WA    white petrolatum   Topical Daily    insulin lispro  0-18 Units Subcutaneous TID WC    insulin lispro  0-9 Units Subcutaneous Nightly    meropenem  1,000 mg Intravenous Q8H    sodium chloride flush  5-40 mL Intravenous 2 times per day    apixaban  5 mg Oral BID    dilTIAZem  240 mg Oral BID    escitalopram  20 mg Oral Daily    ezetimibe  10 mg Oral Nightly    ferrous sulfate  325 mg Oral Daily with breakfast    gabapentin  600 mg Oral TID    insulin glargine  45 Units Subcutaneous BID    pantoprazole  40 mg Oral QAM AC    metoprolol succinate  100 mg Oral BID    miconazole   Topical BID    spironolactone  25 mg Oral Daily    traZODone  50 mg Oral Nightly     PRN Meds: hydrOXYzine, glucose, dextrose, glucagon (rDNA), dextrose, oxyCODONE-acetaminophen, sodium chloride flush, sodium chloride, ondansetron **OR** ondansetron, polyethylene glycol, acetaminophen **OR** acetaminophen, white petrolatum    Labs:     Recent Labs     07/05/21  1620 07/06/21  0613 07/07/21  0618   WBC 7.7 7.8 5.8   HGB 9.6* 8.7* 9.1*   HCT 31.5* 29.0* 30.8*    206 202       Recent Labs     07/05/21  1620 07/06/21  0613 07/07/21  0618    137 138   K 4.9 4.3 3.9    97* 97*   CO2 32* 32* 35*   BUN 27* 23 21   CREATININE 0.8 0.7 0.8   CALCIUM 9.2 8.8 9.0       Recent Labs     07/05/21  1620 07/07/21  0618   PROT 7.0 6.1*   ALKPHOS 111* 94   ALT 12 9   AST 18 8   BILITOT <0.2 0.2       No results for input(s): INR in the last 72 hours. No results for input(s): Areli Lowers in the last 72 hours.     Chronic labs:    Lab Results   Component Value Date    CHOL 355 (H) 01/16/2021    TRIG 298 (H) 01/16/2021    HDL 52 01/16/2021    LDLCALC 243 (H) 01/16/2021    TSH 0.575 01/15/2021    INR 1.8 04/29/2021    LABA1C 7.9 (H) 07/06/2021       Radiology: REVIEWED DAILY    +++++++++++++++++++++++++++++++++++++++++++++++++  Jose Chick, DO  [employer] - [facility], [place]  +++++++++++++++++++++++++++++++++++++++++++++++++  NOTE: This report was transcribed

## 2021-07-08 NOTE — PLAN OF CARE
Problem: Skin Integrity:  Goal: Will show no infection signs and symptoms  Description: Will show no infection signs and symptoms  7/8/2021 1131 by Constantino Gtz RN  Outcome: Met This Shift  7/8/2021 0319 by Sung Curran RN  Outcome: Met This Shift  7/7/2021 2326 by Yaneth Sparks RN  Outcome: Met This Shift     Problem: Falls - Risk of:  Goal: Will remain free from falls  Description: Will remain free from falls  7/8/2021 1131 by Constantino Gtz RN  Outcome: Met This Shift  7/8/2021 0319 by Sung Curran RN  Outcome: Met This Shift  Goal: Absence of physical injury  Description: Absence of physical injury  7/8/2021 1131 by Constantino Gtz RN  Outcome: Met This Shift  7/8/2021 0319 by Sung Curran RN  Outcome: Met This Shift

## 2021-07-08 NOTE — CARE COORDINATION
Spoke with Pt about Transition Plan of Care. Discuss [facility] choices. Pt's choice is [facility] Her friend works there. Referral made to [facility]. Discharge Plan is to [facility] when medically stable pending pre-cert. SW/[facility] to follow for discharge needs.    JENNIFER Penaloza.  [phone]

## 2021-07-08 NOTE — PROGRESS NOTES
Department of Internal Medicine  Infectious Diseases  Progress  Note    C/C :  Chronic lymphedema, leg wound infection     Denies fever or chills  Reports leg pain, and drainage  Afebrile       Current Facility-Administered Medications   Medication Dose Route Frequency Provider Last Rate Last Admin    vancomycin 1500 mg in dextrose 5% 300 mL IVPB  1,500 mg Intravenous Q24H Savannah Dexter RPH   Stopped at 07/08/21 0005    ipratropium-albuterol (DUONEB) nebulizer solution 1 ampule  1 ampule Inhalation Q4H WA Kaylen Dine, DO   1 ampule at 07/08/21 1408    white petrolatum ointment   Topical Daily Kaylen Dine, DO   Given at 07/08/21 0844    hydrOXYzine (VISTARIL) capsule 50 mg  50 mg Oral Q4H PRN Jamila Herr, DO        glucose (GLUTOSE) 40 % oral gel 15 g  15 g Oral PRN Kaylen Dine, DO        dextrose 50 % IV solution  12.5 g Intravenous PRN Kaylen Dine, DO        glucagon (rDNA) injection 1 mg  1 mg Intramuscular PRN Kaylen Dine, DO        dextrose 5 % solution  100 mL/hr Intravenous PRN Kaylen Dine, DO        insulin lispro (HUMALOG) injection vial 0-18 Units  0-18 Units Subcutaneous TID WC Kaylen Dine, DO   12 Units at 07/08/21 1204    insulin lispro (HUMALOG) injection vial 0-9 Units  0-9 Units Subcutaneous Nightly Kaylen Dine, DO   5 Units at 07/07/21 2049    oxyCODONE-acetaminophen (PERCOCET) 5-325 MG per tablet 1 tablet  1 tablet Oral Q4H PRN Kori Desai MD   1 tablet at 07/08/21 1204    meropenem (MERREM) 1,000 mg in sodium chloride 0.9 % 100 mL IVPB (mini-bag)  1,000 mg Intravenous Q8H Kori Desai MD   Stopped at 07/08/21 1444    sodium chloride flush 0.9 % injection 5-40 mL  5-40 mL Intravenous 2 times per day Kori Desai MD   10 mL at 07/07/21 2058    sodium chloride flush 0.9 % injection 5-40 mL  5-40 mL Intravenous PRN Kori Desai MD        0.9 % sodium chloride infusion  25 mL Intravenous PRN Kori Desai MD        ondansetron (ZOFRAN-ODT) disintegrating tablet 4 mg  4 mg Oral Q8H PRN Pro Dean MD        Or    ondansetron (ZOFRAN) injection 4 mg  4 mg Intravenous Q6H PRN Pro Dean MD        polyethylene glycol (GLYCOLAX) packet 17 g  17 g Oral Daily PRN Pro Dean MD        acetaminophen (TYLENOL) tablet 650 mg  650 mg Oral Q6H PRN Pro Dean MD   650 mg at 07/08/21 0938    Or    acetaminophen (TYLENOL) suppository 650 mg  650 mg Rectal Q6H PRN Pro Dean MD        apixaban (ELIQUIS) tablet 5 mg  5 mg Oral BID Pro Dean MD   5 mg at 07/08/21 0839    dilTIAZem (CARDIZEM CD) extended release capsule 240 mg  240 mg Oral BID Pro Dean MD   240 mg at 07/08/21 0837    escitalopram (LEXAPRO) tablet 20 mg  20 mg Oral Daily Pro Dean MD   20 mg at 07/08/21 8878    ezetimibe (ZETIA) tablet 10 mg  10 mg Oral Nightly Pro Dean MD   10 mg at 07/07/21 2057    ferrous sulfate (IRON 325) tablet 325 mg  325 mg Oral Daily with breakfast Pro Dean MD   325 mg at 07/08/21 0838    gabapentin (NEURONTIN) capsule 600 mg  600 mg Oral TID Pro Dean MD   600 mg at 07/08/21 1413    insulin glargine (LANTUS) injection vial 45 Units  45 Units Subcutaneous BID Pro Dean MD   45 Units at 07/08/21 0839    pantoprazole (PROTONIX) tablet 40 mg  40 mg Oral QAM AC Pro Dean MD   40 mg at 07/08/21 0554    metoprolol succinate (TOPROL XL) extended release tablet 100 mg  100 mg Oral BID Pro Dean MD   100 mg at 07/08/21 0836    miconazole (MICOTIN) 2 % powder   Topical BID Pro Dean MD   Given at 07/08/21 0844    white petrolatum ointment   Topical TID PRN Pro Dean MD   Given at 07/07/21 1145    spironolactone (ALDACTONE) tablet 25 mg  25 mg Oral Daily Pro Dean MD   25 mg at 07/08/21 8916    traZODone (DESYREL) tablet 50 mg  50 mg Oral Nightly Pro Dean MD   50 mg at 07/07/21 2057       REVIEW OF SYSTEMS:    CONSTITUTIONAL:  Denies fever, chill or rigors  HEENT: denies blurring of vision or double vision, denies hearing problem  RESPIRATORY: denies cough, shortness of breath, sputum expectoration, chest pain. CARDIOVASCULAR:  Denies palpitation  GASTROINTESTINAL:  Denies abdomen pain, diarrhea or constipation. GENITOURINARY:  Denies burning urination or frequency of urination  INTEGUMENT: Bilateral leg wounds   HEMATOLOGIC/LYMPHATIC:  Denies lymph node swelling, gum bleeding or easy bruising. MUSCULOSKELETAL: Bilateral leg swelling, redness, pain   NEUROLOGICAL:  Denies light headed, dizziness      PHYSICAL EXAM:      Vitals:     BP (!) 109/54   Pulse 76   Temp 98 °F (36.7 °C) (Oral)   Resp 24   Ht 5' 3\" (1.6 m)   Wt 294 lb 4.8 oz (133.5 kg)   LMP  (LMP Unknown)   SpO2 99%   BMI 52.13 kg/m²     General Appearance:    Awake, alert , morbidly obese    Head:    Normocephalic, atraumatic   Eyes:    No pallor, no icterus,   Ears:    No obvious deformity or drainage.    Nose:   No nasal drainage   Throat:   Mucosa moist, no oral thrush   Neck:   Supple, no lymphadenopathy   Back:     no CVA tenderness   Lungs:     Clear to auscultation bilaterally, no wheeze    Heart:    Regular rate and rhythm, no murmur, rub or gallop   Abdomen:     Soft, non-tender, bowel sounds present    Extremities:  Bilateral chronic lymphedema, wounds, with mod drainage,  Warm to touch , erythema legs    Pulses:   Dorsalis pedis palpable    Skin:  less drainage             DATA:      CBC with Differential:      Lab Results   Component Value Date    WBC 6.9 07/08/2021    RBC 3.69 07/08/2021    HGB 9.2 07/08/2021    HCT 30.5 07/08/2021     07/08/2021    MCV 82.7 07/08/2021    MCH 24.9 07/08/2021    MCHC 30.2 07/08/2021    RDW 15.6 07/08/2021    NRBC 0.0 12/04/2018    SEGSPCT 85 01/29/2014    METASPCT 3.5 12/04/2018    LYMPHOPCT 20.0 07/08/2021    MONOPCT 7.6 07/08/2021    MYELOPCT 2.6 12/04/2018    BASOPCT 0.4 07/08/2021    MONOSABS 0.53 07/08/2021 RECOMMENDATIONS:      1. Vancomycin 1500 grams IV q 12 hrs ( clinical pharmacy following )   2. Meropenem 1 gram IV q 8 hrs   3. Wound cx   4.  Contact isolation Due to ischemic cardiomyopathy.   Patient unable to tolerate beta-blocker due to hypotension.  In light of hypotension requiring  up titration of midodrine  Fluid removal with HD as BP tolerates. Due to ischemic cardiomyopathy.   Patient unable to tolerate beta-blocker due to hypotension.  In light of hypotension requiring  up titration of midodrine  -HD stopped

## 2021-07-09 LAB
ANION GAP SERPL CALCULATED.3IONS-SCNC: 9 MMOL/L (ref 7–16)
BASOPHILS ABSOLUTE: 0.04 E9/L (ref 0–0.2)
BASOPHILS RELATIVE PERCENT: 0.5 % (ref 0–2)
BUN BLDV-MCNC: 33 MG/DL (ref 6–23)
CALCIUM SERPL-MCNC: 9.1 MG/DL (ref 8.6–10.2)
CHLORIDE BLD-SCNC: 95 MMOL/L (ref 98–107)
CO2: 32 MMOL/L (ref 22–29)
CREAT SERPL-MCNC: 1 MG/DL (ref 0.5–1)
EOSINOPHILS ABSOLUTE: 0.2 E9/L (ref 0.05–0.5)
EOSINOPHILS RELATIVE PERCENT: 2.6 % (ref 0–6)
GFR AFRICAN AMERICAN: >60
GFR NON-AFRICAN AMERICAN: 55 ML/MIN/1.73
GLUCOSE BLD-MCNC: 194 MG/DL (ref 74–99)
HCT VFR BLD CALC: 31.4 % (ref 34–48)
HEMOGLOBIN: 9.4 G/DL (ref 11.5–15.5)
IMMATURE GRANULOCYTES #: 0.02 E9/L
IMMATURE GRANULOCYTES %: 0.3 % (ref 0–5)
LYMPHOCYTES ABSOLUTE: 1.35 E9/L (ref 1.5–4)
LYMPHOCYTES RELATIVE PERCENT: 17.6 % (ref 20–42)
MAGNESIUM: 2.7 MG/DL (ref 1.6–2.6)
MCH RBC QN AUTO: 24.7 PG (ref 26–35)
MCHC RBC AUTO-ENTMCNC: 29.9 % (ref 32–34.5)
MCV RBC AUTO: 82.6 FL (ref 80–99.9)
METER GLUCOSE: 218 MG/DL (ref 74–99)
METER GLUCOSE: 263 MG/DL (ref 74–99)
METER GLUCOSE: 366 MG/DL (ref 74–99)
METER GLUCOSE: 425 MG/DL (ref 74–99)
MONOCYTES ABSOLUTE: 0.63 E9/L (ref 0.1–0.95)
MONOCYTES RELATIVE PERCENT: 8.2 % (ref 2–12)
NEUTROPHILS ABSOLUTE: 5.41 E9/L (ref 1.8–7.3)
NEUTROPHILS RELATIVE PERCENT: 70.8 % (ref 43–80)
PDW BLD-RTO: 15.6 FL (ref 11.5–15)
PLATELET # BLD: 243 E9/L (ref 130–450)
PMV BLD AUTO: 9.7 FL (ref 7–12)
POTASSIUM SERPL-SCNC: 4.4 MMOL/L (ref 3.5–5)
RBC # BLD: 3.8 E12/L (ref 3.5–5.5)
SODIUM BLD-SCNC: 136 MMOL/L (ref 132–146)
VANCOMYCIN TROUGH: 10.6 MCG/ML (ref 5–16)
WBC # BLD: 7.7 E9/L (ref 4.5–11.5)

## 2021-07-09 PROCEDURE — 94640 AIRWAY INHALATION TREATMENT: CPT

## 2021-07-09 PROCEDURE — 6370000000 HC RX 637 (ALT 250 FOR IP): Performed by: FAMILY MEDICINE

## 2021-07-09 PROCEDURE — 6360000002 HC RX W HCPCS

## 2021-07-09 PROCEDURE — 2580000003 HC RX 258

## 2021-07-09 PROCEDURE — 1200000000 HC SEMI PRIVATE

## 2021-07-09 PROCEDURE — 2700000000 HC OXYGEN THERAPY PER DAY

## 2021-07-09 PROCEDURE — 97162 PT EVAL MOD COMPLEX 30 MIN: CPT | Performed by: PHYSICAL THERAPIST

## 2021-07-09 PROCEDURE — 36415 COLL VENOUS BLD VENIPUNCTURE: CPT

## 2021-07-09 PROCEDURE — 80202 ASSAY OF VANCOMYCIN: CPT

## 2021-07-09 PROCEDURE — 80048 BASIC METABOLIC PNL TOTAL CA: CPT

## 2021-07-09 PROCEDURE — 6360000002 HC RX W HCPCS: Performed by: FAMILY MEDICINE

## 2021-07-09 PROCEDURE — 82962 GLUCOSE BLOOD TEST: CPT

## 2021-07-09 PROCEDURE — 97530 THERAPEUTIC ACTIVITIES: CPT | Performed by: PHYSICAL THERAPIST

## 2021-07-09 PROCEDURE — 2580000003 HC RX 258: Performed by: FAMILY MEDICINE

## 2021-07-09 PROCEDURE — 97530 THERAPEUTIC ACTIVITIES: CPT

## 2021-07-09 PROCEDURE — 83735 ASSAY OF MAGNESIUM: CPT

## 2021-07-09 PROCEDURE — 85025 COMPLETE CBC W/AUTO DIFF WBC: CPT

## 2021-07-09 PROCEDURE — 2500000003 HC RX 250 WO HCPCS: Performed by: FAMILY MEDICINE

## 2021-07-09 PROCEDURE — 97166 OT EVAL MOD COMPLEX 45 MIN: CPT

## 2021-07-09 RX ADMIN — DILTIAZEM HYDROCHLORIDE 240 MG: 240 CAPSULE, EXTENDED RELEASE ORAL at 21:48

## 2021-07-09 RX ADMIN — INSULIN GLARGINE 45 UNITS: 100 INJECTION, SOLUTION SUBCUTANEOUS at 08:40

## 2021-07-09 RX ADMIN — SODIUM CHLORIDE, PRESERVATIVE FREE 10 ML: 5 INJECTION INTRAVENOUS at 21:50

## 2021-07-09 RX ADMIN — TRAZODONE HYDROCHLORIDE 50 MG: 50 TABLET ORAL at 21:49

## 2021-07-09 RX ADMIN — DILTIAZEM HYDROCHLORIDE 240 MG: 240 CAPSULE, EXTENDED RELEASE ORAL at 08:35

## 2021-07-09 RX ADMIN — PETROLATUM: 420 OINTMENT TOPICAL at 08:37

## 2021-07-09 RX ADMIN — GABAPENTIN 600 MG: 300 CAPSULE ORAL at 21:49

## 2021-07-09 RX ADMIN — INSULIN LISPRO 9 UNITS: 100 INJECTION, SOLUTION INTRAVENOUS; SUBCUTANEOUS at 22:03

## 2021-07-09 RX ADMIN — APIXABAN 5 MG: 5 TABLET, FILM COATED ORAL at 08:35

## 2021-07-09 RX ADMIN — OXYCODONE AND ACETAMINOPHEN 1 TABLET: 5; 325 TABLET ORAL at 23:15

## 2021-07-09 RX ADMIN — APIXABAN 5 MG: 5 TABLET, FILM COATED ORAL at 21:49

## 2021-07-09 RX ADMIN — GABAPENTIN 600 MG: 300 CAPSULE ORAL at 08:35

## 2021-07-09 RX ADMIN — IPRATROPIUM BROMIDE AND ALBUTEROL SULFATE 1 AMPULE: 2.5; .5 SOLUTION RESPIRATORY (INHALATION) at 21:40

## 2021-07-09 RX ADMIN — GABAPENTIN 600 MG: 300 CAPSULE ORAL at 16:01

## 2021-07-09 RX ADMIN — METOPROLOL SUCCINATE 100 MG: 100 TABLET, EXTENDED RELEASE ORAL at 08:35

## 2021-07-09 RX ADMIN — MICONAZOLE NITRATE: 20.6 POWDER TOPICAL at 21:50

## 2021-07-09 RX ADMIN — INSULIN LISPRO 6 UNITS: 100 INJECTION, SOLUTION INTRAVENOUS; SUBCUTANEOUS at 08:39

## 2021-07-09 RX ADMIN — INSULIN LISPRO 9 UNITS: 100 INJECTION, SOLUTION INTRAVENOUS; SUBCUTANEOUS at 12:44

## 2021-07-09 RX ADMIN — FERROUS SULFATE TAB 325 MG (65 MG ELEMENTAL FE) 325 MG: 325 (65 FE) TAB at 08:35

## 2021-07-09 RX ADMIN — PANTOPRAZOLE SODIUM 40 MG: 40 TABLET, DELAYED RELEASE ORAL at 06:02

## 2021-07-09 RX ADMIN — MEROPENEM 1000 MG: 1 INJECTION, POWDER, FOR SOLUTION INTRAVENOUS at 21:48

## 2021-07-09 RX ADMIN — IPRATROPIUM BROMIDE AND ALBUTEROL SULFATE 1 AMPULE: 2.5; .5 SOLUTION RESPIRATORY (INHALATION) at 16:44

## 2021-07-09 RX ADMIN — INSULIN GLARGINE 45 UNITS: 100 INJECTION, SOLUTION SUBCUTANEOUS at 22:03

## 2021-07-09 RX ADMIN — Medication 1500 MG: at 23:16

## 2021-07-09 RX ADMIN — OXYCODONE AND ACETAMINOPHEN 1 TABLET: 5; 325 TABLET ORAL at 16:03

## 2021-07-09 RX ADMIN — IPRATROPIUM BROMIDE AND ALBUTEROL SULFATE 1 AMPULE: 2.5; .5 SOLUTION RESPIRATORY (INHALATION) at 09:53

## 2021-07-09 RX ADMIN — OXYCODONE AND ACETAMINOPHEN 1 TABLET: 5; 325 TABLET ORAL at 01:28

## 2021-07-09 RX ADMIN — INSULIN LISPRO 15 UNITS: 100 INJECTION, SOLUTION INTRAVENOUS; SUBCUTANEOUS at 18:04

## 2021-07-09 RX ADMIN — METOPROLOL SUCCINATE 100 MG: 100 TABLET, EXTENDED RELEASE ORAL at 21:50

## 2021-07-09 RX ADMIN — MEROPENEM 1000 MG: 1 INJECTION, POWDER, FOR SOLUTION INTRAVENOUS at 04:14

## 2021-07-09 RX ADMIN — MEROPENEM 1000 MG: 1 INJECTION, POWDER, FOR SOLUTION INTRAVENOUS at 12:44

## 2021-07-09 RX ADMIN — IPRATROPIUM BROMIDE AND ALBUTEROL SULFATE 1 AMPULE: 2.5; .5 SOLUTION RESPIRATORY (INHALATION) at 12:52

## 2021-07-09 RX ADMIN — EZETIMIBE 10 MG: 10 TABLET ORAL at 21:49

## 2021-07-09 RX ADMIN — SPIRONOLACTONE 25 MG: 25 TABLET ORAL at 08:35

## 2021-07-09 RX ADMIN — ESCITALOPRAM 20 MG: 10 TABLET, FILM COATED ORAL at 08:35

## 2021-07-09 RX ADMIN — SODIUM CHLORIDE, PRESERVATIVE FREE 10 ML: 5 INJECTION INTRAVENOUS at 08:38

## 2021-07-09 ASSESSMENT — PAIN SCALES - GENERAL
PAINLEVEL_OUTOF10: 0
PAINLEVEL_OUTOF10: 0
PAINLEVEL_OUTOF10: 10
PAINLEVEL_OUTOF10: 6
PAINLEVEL_OUTOF10: 0
PAINLEVEL_OUTOF10: 6

## 2021-07-09 NOTE — PLAN OF CARE
Problem: Falls - Risk of:  Goal: Will remain free from falls  Description: Will remain free from falls  7/9/2021 1134 by Shanthi Abdalla RN  Outcome: Met This Shift  7/9/2021 0401 by Марина Rodgers RN  Outcome: Met This Shift  Goal: Absence of physical injury  Description: Absence of physical injury  7/9/2021 1134 by Shanthi Abdalla RN  Outcome: Met This Shift  7/9/2021 0401 by Марина Rodgers RN  Outcome: Met This Shift     Problem: Pain:  Goal: Pain level will decrease  Description: Pain level will decrease  7/9/2021 1134 by Shanthi Abdalla RN  Outcome: Met This Shift  7/9/2021 0401 by Марина Rodgers RN  Outcome: Met This Shift  Goal: Control of acute pain  Description: Control of acute pain  7/9/2021 1134 by Shanthi Abdalla RN  Outcome: Met This Shift  7/9/2021 0401 by Марина Rodgers RN  Outcome: Met This Shift  Goal: Control of chronic pain  Description: Control of chronic pain  7/9/2021 0401 by Марина Rodgers RN  Outcome: Met This Shift

## 2021-07-09 NOTE — PROGRESS NOTES
[address]        Date:2021                                                  Patient Name: Juli Mendez    MRN: [id number]    : 1955    Room: [address] [place] [address]      Evaluating OT: Antonette Herring, [address] OTR/L; [id number]      Referring Provider: Nela Rivera DO    Specific Provider Orders/Date: OT Eval and Treat 21      Diagnosis: BLE Cellulitis    Surgery: none    Pertinent Medical History: DM, HTN, CHF, physical deconditioning, lymphedema of BLE, Morbid obesity, ulcers of BLE, Atrial Fibrillation, COPD with asthma, Osteoarthritis, HLD, Anxiety and Depression, Chronic pain, YEIMI, Hepatitis C, GI Bleed, R LE fracture with surgical repair, L knee replacement    Recommended Adaptive Equipment: TBD      Precautions:  Fall Risk, BLE wound infection contact precautions, O2     Assessment of current deficits    [x] Functional mobility  [x]ADLs  [x] Strength               [x]Cognition    [x] Functional transfers   [x] IADLs         [x] Safety Awareness   [x]Endurance    [x] Fine Coordination              [x] Balance      [] Vision/perception   []Sensation     []Gross Motor Coordination  [] ROM  [] Delirium                   [] Motor Control     OT PLAN OF CARE   OT POC based on physician orders, patient diagnosis and results of clinical assessment    Frequency/Duration: 2-4 days/wk for 2 weeks PRN   Specific OT Treatment Interventions to include:   * Instruction/training on adapted ADL techniques and AE recommendations to increase functional independence within precautions   * Training on energy conservation strategies, correct breathing pattern and techniques to improve independence/tolerance for self-care routine  * Functional transfer/mobility training/DME recommendations for increased independence, safety, and fall prevention  * Patient/Family education to increase follow through with safety techniques and functional independence  * Recommendation of environmental modifications for increased safety with functional transfers/mobility and ADLs  * Therapeutic exercise to improve motor endurance, ROM, and functional strength for ADLs/functional transfers  * Therapeutic activities to facilitate/challenge dynamic balance, stand tolerance for increased safety and independence with ADLs      Home Living: Pt lives with  in 2 story home with 1st floor set up, with ramp to enter. Pt using BSC and sponge bathing  Bathroom setup: walk in shower  Equipment owned: ww, BSC, Rollator, Home O2 2L, Lift Chair, w/c    Prior Level of Function: assist from  with ADLs including dressing/bathing/toileting  and 2 adult grandchildren assist with IADLs; ambulated with rollator in home    Pain Level: 4/10 in BLE  Cognition: A&O: 4/4; Follows multi step directions   Memory:  good   Sequencing:  fair   Problem solving:  fair   Judgement/safety:  fair     Functional Assessment:  AM-PAC Daily Activity Raw Score: 11/24   Initial Eval Status  Date: 7/9/21 Treatment Status  Date: STGs = LTGs  Time frame: 10-14 days   Feeding Independent      Grooming Moderate Assist   Decreased endurance and BUE ROM for task  Minimal Assist    UB Dressing Moderate Assist   Decreased BUE ROM  Minimal Assist    LB Dressing Dependent  Decreased BLE ROM for donning/doffing socks  Maximal Assist    Bathing Dependent  BLE bathing and UB dressing d/t decreased AROM  Moderate Assist    Toileting Dependent   Hansen/pt reports no recent bowel movement  BSC in room  Moderate Assist    Bed Mobility  Log Roll: NT  Supine to sit: NT   Sit to supine: NT   Supine to sit: Moderate Assist   Sit to supine: Moderate Assist    Functional Transfers Sit to stand: Max A x 2   Stand to sit: Max A x 2   Stand pivot: NT  Commode: NT  Sit to stand: Moderate A   Stand to sit:Moderate A  Stand pivot: Moderate A  Commode:  Moderate A    Functional Mobility Unable to advanced  Moderate Assist with ww   Balance Sitting:     Static - SBA     Dynamic - SBA  Standing: Max A x 2   Sitting:  Static: IN  Dynamic: IND  Standing: Mod A    Activity Tolerance Poor  Fair   Visual/  Perceptual Glasses: no                  Hand Dominance: Right   AROM (PROM) Strength Additional Info:  Goal:   RUE  ~120 degrees of AROM  Pt stated d/t weakness no history of injury 3/5 good  and wfl FMC/dexterity noted during ADL tasks   Improve overall RUE strength to 3+/5 for participation in functional tasks       LUE ~120 degrees of AROM  Pt stated d/t weakness no history of injury 3/5 Good  and wfl FMC/dexterity noted during ADL tasks   Improve overall LUE strength to 3+/5 for participation in functional tasks       [facility]   Sensation:  No c/o numbness or tingling   Tone: WFL   Edema: none noted    Comment/Treatment: Cleared by RN to see pt. Upon arrival patient sitting in bedside chair and agreeable to OT session. Pt required vc's for hand placement for sit to stand transfer, required max A of 2. Pt stated increased pain in BLE during sit to stand, educated on advancement using ww, pt unable to progress BLE requesting rest break. Pt stated unable to bend BLE for donning/doffing socks. At end of session, patient sitting in bedside chair with call light and phone within reach, all lines and tubes intact. Overall patient demonstrated decreased independence and safety during completion of ADL/functional transfer/mobility tasks. Pt would benefit from continued skilled OT to increase safety and independence with completion of ADL/IADL tasks for functional independence and quality of life. Rehab Potential: Good  for established goals     LTG: maximize independence with ADLs to return to PLOF    Patient and/or family were instructed on functional diagnosis, prognosis/goals and OT plan of care. Demonstrated FAIR understanding.     [] Malnutrition indicators have been identified and nursing has been notified to ensure a dietitian consult is ordered. Eval Complexity: Mod  · History: Expanded chart review of medical records and additional review of physical, cognitive, or psychosocial history related to current functional performance  · Exam: 3+ performance deficits  · Assistance/Modification: Mod assistance or modifications required to perform tasks. May have comorbidities that affect occupational performance. Evaluation time includes thorough review of current medical information, gathering information on past medical & social history & PLOF, completion of standardized testing, informal observation of tasks, consultation with other medical professions/disciplines, assessment of data & development of POC/goals. Time In: 0206  Time Out: 2877  Total Treatment Time: 21    Min Units   OT Eval Low 62950  x     OT Eval Medium 07359      OT Eval High 93608      OT Re-Eval [id number]       Therapeutic Ex 70058       Therapeutic Activities 02707  10  1   ADL/Self Care 95257       Orthotic Management 35870       Manual [id number]     Neuro Re-Ed 58780       Non-Billable Time          Evaluation Time additionally includes thorough review of current medical information, gathering information on past medical history/social history and prior level of function, interpretation of standardized testing/informal observation of tasks, assessment of data and development of plan of care and goals.          Wilma Patient, MSOT OTR/L; [id number]

## 2021-07-09 NOTE — PROGRESS NOTES
Pharmacy Consultation Note  (Antibiotic Dosing and Monitoring)    Initial consult date:    Consulting physician/provider: Franklyn Tinajero and Jose Martin ()  Drug(s): vancomycin   Indication: CSSSI; B/L LE cellulitis  Age/Gender IBW DW  Allergy Information   77 y.o. female; 160 cm, 117.9 kg 52.9 kg 78.9 kg  Statins           Date  WBC BUN/CR Drug/Dose Time   Given Level(s)   (Time) Comments    7.7 27/0.8 X X     7/6 7.8 23/0.7 vanco 2000 mg q12h 1010, 2242     /7 5.8 21/0.8 vanco 1500 mg q24h 2211 Random level = 19.9 mCg/mL @ 0618    /8 6.9 27/1 vanco 1500 mg q24h 2235     /9 7.7 33/1 vanco 1500 mg q24h <2200> vanc Tr @ 2130               Estimated Creatinine Clearance: 74 mL/min (based on SCr of 1 mg/dL). Intake/Output Summary (Last 24 hours) at 2021 1056  Last data filed at 2021 0540  Gross per 24 hour   Intake --   Output 1150 ml   Net -1150 ml   UO = 0.4 mL/kg/hr (1150 mL), but only for 16 hours documented. Temp max: Temp (24hrs), Av.6 °F (37 °C), Min:97.9 °F (36.6 °C), Max:99.3 °F (37.4 °C)    Assessment:  · Consulted by Dr. Kimberly Rivera to dose/monitor vancomycin. · Goal trough level:  15-20 mCg/mL; AUC/MALKA = 400-600 mg/L-hr. · 66 yo/F admitted for B/L LE pain, swelling, and redness. Has B/L open wounds, RLE wound is draining. · PMH: ESBL pyelonepritis/bacteremia in Apr-May (ertapenem). · Empiric ATBs (vanco and meropenem) started on this admission. · ID consulted and wants Pharmacy to continue the consult (d/w Dr. Alen Garcia today. · : day #2 vanco, day #3 meropenem. Vanc random level = 19.9 this AM; low UO documented. Afebrile, WBC WNL, BC's NGTD; Wound Cx (): mixed GNR, growing Proteus species. · : day #4 vanco, day #5 meropenem. +Staph aureus from Wound Cx (), (S)-pending. Low UO documented, but incomplete data and obese patient. Plan:  · Continue vancomycin 1500 mg q24h: predicted AUC/MALKA = 451,   Tr = 14 mCg/mL.    · Check vanco trough tonight; HOLD dose if > 20 mCg/mL. · Pharmacist will follow and monitor/adjust dosing as necessary.     Darren King PharmD  7/9/2021  10:56 AM

## 2021-07-09 NOTE — PROGRESS NOTES
Physical Therapy  Physical Therapy Initial Assessment     Name: Rosie Lockwood  : 1955  MRN: [id number]      Date of Service: 2021    Ron King, PT, DPT  [id number]      Room #:  0262/5633-J  Diagnosis:  Bilateral cellulitis of lower leg [L03.116, S18.481]  PMHx/PSHx:  Afib, asthma, inappropriate sinus tachycardia, HLD, HTN, disc disorder, blood transfusion, arthritis, anal fissure, LVH, occipital neuralgia, fall, lymphadenitis, GERD, CHF, COPD, anxiety and depression, heart failure, lateral sphincterotomy for chronic anal fissure, R LE fracture with surgical repair, L knee replacement  Procedure/Surgery:  None this admission  Precautions:  BLE wounds, falls, O2  Equipment Needs:  TBD    SUBJECTIVE:    Pt lives with her  and 2 adult grandsons in a 2 story house with ramp to enter. Pt has permanent first floor set up. Pt ambulated with rollator prior to admission. She sleeps in a lift chair at baseline. OBJECTIVE:   Initial Evaluation  Date: 21 Treatment Short Term/ Long Term   Goals   AM-PAC 6 Clicks      Was pt agreeable to Eval/treatment? yes     Does pt have pain? B LEs, not rated     Bed Mobility  Rolling: NT  Supine to sit: NT  Sit to supine: NT  Scooting: NT  Rolling: Abbie  Supine to sit: Abbie  Sit to supine: Abbie  Scooting: Abbei   Transfers Sit to stand: Max A x2  Stand to sit: Max Ax  Stand pivot: NT, pt declined  Sit to stand: Min A  Stand to sit: Min A  Stand pivot: Min A with AAD   Ambulation    NT, pt declined due to B LE pain  >25 feet with AAD with Min A   Stair negotiation: ascended and descended  NT  TBD   ROM BUE:  Defer to OT  BLE:  L LE WNL, R LE limited due to pain     Strength BUE:  Defer to OT  L LE 3+/5  R LE limited by pain     Balance Sitting EOB:  NT  Dynamic Standing:   Max A with bariatric FWW  Sitting EOB:  SBA  Dynamic Standing:  Min A with AAD     Pt is A & O x 4  Sensation:  Pt denies numbness and tingling to extremities  Edema: edema of B LEs with ace wraps and bandages    Patient education  Pt educated on role of therapy, importance of continued mobility during admission, safety with transfers    Patient response to education:   Pt verbalized understanding Pt demonstrated skill Pt requires further education in this area   yes yes yes     ASSESSMENT:    Conditions Requiring Skilled Therapeutic Intervention:    [x]Decreased strength     []Decreased ROM  [x]Decreased functional mobility  [x]Decreased balance   [x]Decreased endurance   [x]Decreased posture  [x]Decreased sensation  []Decreased coordination   []Decreased vision  [x]Decreased safety awareness   [x]Increased pain       Comments:  Pt sitting in chair upon arrival, agreeable to PT eval. Pt required encouragement for initiation of mobility and max cues for safe hand placement with sit<>stand tranfers. Pt assisted with lift/lower x2 to achieve upright standing. Pt maintained forward flexed posture during standing. Pt declined ambulation due to increased R LE pain with WB. Pt was able to complete standing weight shift R/L x1 minute with B UE support on walker and hands on assist for balance. Pt returned to chair upon completion of session with all needs in reach. Treatment:  Patient practiced and was instructed in the following treatment:     Transfer training: cues for hand placement, manual assist for lift/lower, max cues for foot placement    Pt's/ family goals   1. To return home with     Prognosis is fair for reaching above PT goals. Patient and or family understand(s) diagnosis, prognosis, and plan of care.   yes    PHYSICAL THERAPY PLAN OF CARE:    PT POC is established based on physician order and patient diagnosis     Referring provider/PT Order:    07/07/21 1715  PT eval and treat Start: 07/07/21 1715, End: 07/07/21 1715, ONE TIME, Standing Count: 1 Occurrences, R      Pamela Garcia DO     Diagnosis:  Bilateral cellulitis of lower leg [L03.116, L03.115]  Specific instructions for next treatment:  Progress standing tolerance, initiate ambulation as able    Current Treatment Recommendations:     [x] Strengthening to improve independence with functional mobility   [] ROM to improve independence with functional mobility   [x] Balance Training to improve static/dynamic balance and to reduce fall risk  [x] Endurance Training to improve activity tolerance during functional mobility   [x] Transfer Training to improve safety and independence with all functional transfers   [x] Gait Training to improve gait mechanics, endurance and asses need for appropriate assistive device  [] Stair Training in preparation for safe discharge home and/or into the community   [x] Positioning to prevent skin breakdown and contractures  [x] Safety and Education Training   [x] Patient/Caregiver Education   [] HEP  [] Other     PT long term treatment goals are located in above grid    Frequency of treatments: 2-5x/week x 1-2 weeks. Time in  1406  Time out  1427    Total Treatment Time  10 minutes     Evaluation Time includes thorough review of current medical information, gathering information on past medical history/social history and prior level of function, completion of standardized testing/informal observation of tasks, assessment of data and education on plan of care and goals.     CPT codes:  [] Low Complexity PT evaluation 53905  [x] Moderate Complexity PT evaluation 73489  [] High Complexity PT evaluation 74137  [] PT Re-evaluation 83380  [] Gait training 72164 0 minutes  [] Manual therapy 29938 0 minutes  [x] Therapeutic activities 30283 10 minutes  [] Therapeutic exercises 91610 0 minutes  [] Neuromuscular reeducation 75673 0 minutes     Fang Cherry, PT, DPT  [id number]

## 2021-07-09 NOTE — PROGRESS NOTES
(ZOFRAN-ODT) disintegrating tablet 4 mg  4 mg Oral Q8H PRN Rebekah Fabry, MD        Or    ondansetron (ZOFRAN) injection 4 mg  4 mg Intravenous Q6H PRN Rebekah Fabry, MD        polyethylene glycol (GLYCOLAX) packet 17 g  17 g Oral Daily PRN Rebekah Fabry, MD        acetaminophen (TYLENOL) tablet 650 mg  650 mg Oral Q6H PRN Rebekah Fabry, MD   650 mg at 07/08/21 3411    Or    acetaminophen (TYLENOL) suppository 650 mg  650 mg Rectal Q6H PRN Rebekah Fabry, MD        apixaban (ELIQUIS) tablet 5 mg  5 mg Oral BID Rebekah Fabry, MD   5 mg at 07/09/21 0835    dilTIAZem (CARDIZEM CD) extended release capsule 240 mg  240 mg Oral BID Rebekah Fabry, MD   240 mg at 07/09/21 0835    escitalopram (LEXAPRO) tablet 20 mg  20 mg Oral Daily Rebekah Fabry, MD   20 mg at 07/09/21 0835    ezetimibe (ZETIA) tablet 10 mg  10 mg Oral Nightly Rebekah Fabry, MD   10 mg at 07/08/21 2052    ferrous sulfate (IRON 325) tablet 325 mg  325 mg Oral Daily with breakfast Rebekah Fabry, MD   325 mg at 07/09/21 0835    gabapentin (NEURONTIN) capsule 600 mg  600 mg Oral TID Rebekah Fabry, MD   600 mg at 07/09/21 0835    insulin glargine (LANTUS) injection vial 45 Units  45 Units Subcutaneous BID Rebekah Fabry, MD   45 Units at 07/09/21 0840    pantoprazole (PROTONIX) tablet 40 mg  40 mg Oral QAM AC Rebekah Fabry, MD   40 mg at 07/09/21 0602    metoprolol succinate (TOPROL XL) extended release tablet 100 mg  100 mg Oral BID Rebekah Fabry, MD   100 mg at 07/09/21 0835    miconazole (MICOTIN) 2 % powder   Topical BID Rebekah Fabry, MD   Given at 07/08/21 2147    white petrolatum ointment   Topical TID PRN Rebekah Fabry, MD   Given at 07/09/21 0837    spironolactone (ALDACTONE) tablet 25 mg  25 mg Oral Daily Rebekah Fabry, MD   25 mg at 07/09/21 0835    traZODone (DESYREL) tablet 50 mg  50 mg Oral Nightly Rebekah Fabry, MD   50 mg at 07/08/21 2052       REVIEW OF SYSTEMS: 07/09/2021    BASOSABS 0.04 07/09/2021       CMP     Lab Results   Component Value Date     07/09/2021    K 4.4 07/09/2021    K 4.3 07/06/2021    CL 95 07/09/2021    CO2 32 07/09/2021    BUN 33 07/09/2021    CREATININE 1.0 07/09/2021    GFRAA >60 07/09/2021    LABGLOM 55 07/09/2021    GLUCOSE 194 07/09/2021    GLUCOSE 181 12/16/2011    PROT 6.1 07/07/2021    LABALBU 3.5 07/07/2021    LABALBU 4.5 11/02/2011    CALCIUM 9.1 07/09/2021    BILITOT 0.2 07/07/2021    ALKPHOS 94 07/07/2021    AST 8 07/07/2021    ALT 9 07/07/2021         Hepatic Function Panel:    Lab Results   Component Value Date    ALKPHOS 94 07/07/2021    ALT 9 07/07/2021    AST 8 07/07/2021    PROT 6.1 07/07/2021    BILITOT 0.2 07/07/2021    BILIDIR <0.2 12/26/2019    IBILI see below 12/26/2019    LABALBU 3.5 07/07/2021    LABALBU 4.5 11/02/2011       PT/INR:    Lab Results   Component Value Date    PROTIME 19.5 04/29/2021    INR 1.8 04/29/2021       TSH:    Lab Results   Component Value Date    TSH 0.575 01/15/2021       U/A:    Lab Results   Component Value Date    NITRITE negative 07/26/2018    COLORU Yellow 07/06/2021    PHUR 6.0 07/06/2021    WBCUA 1-3 07/06/2021    RBCUA 2-5 07/06/2021    YEAST Present 05/28/2020    BACTERIA RARE 07/06/2021    CLARITYU Clear 07/06/2021    SPECGRAV 1.020 07/06/2021    LEUKOCYTESUR Negative 07/06/2021    UROBILINOGEN 0.2 07/06/2021    BILIRUBINUR Negative 07/06/2021    BILIRUBINUR negative 07/26/2018    BLOODU SMALL 07/06/2021    GLUCOSEU >=1000 07/06/2021    AMORPHOUS FEW 08/14/2020       Vanco  19.9     MICROBIOLOGY:    Wound cx -     Growth present, evaluating for:   Mixed Gram negative rods   Proteus species    07/08/2021   Growth present, evaluating for:   Mixed Gram negative rods   Proteus species   Corynebacteria   Mixed Gram positive organisms   Mixed orin isolated. Further workup and sensitivity testing   is not routinely indicated and will not be performed.    Mixed orin isolated includes:   Proteus species   Mixed Gram negative rods   Corynebacteria   Abnormal     Organism Staphylococcus aureusAbnormal     WOUND/ABSCESS --         IMPRESSION:     1. Bilateral leg lymphedema, wound infection, cellulitis   2. Morbid obesity         RECOMMENDATIONS:      1. Vancomycin 1500 grams IV q 12 hrs ( clinical pharmacy following )   2. Meropenem 1 gram IV q 8 hrs   3. Wound cx   4.  Contact isolation

## 2021-07-09 NOTE — CARE COORDINATION
Met with pt today at bedside. [facility] is not able to accept. Referral made to [facility], pt is agreeable. Pending acceptance. Spouse admitted to [facility] Rm. [id number], room info provided to pt.      Riddhi, Sue Gallegos

## 2021-07-10 LAB
BLOOD CULTURE, ROUTINE: NORMAL
CULTURE, BLOOD 2: NORMAL
METER GLUCOSE: 261 MG/DL (ref 74–99)
METER GLUCOSE: 325 MG/DL (ref 74–99)
METER GLUCOSE: 326 MG/DL (ref 74–99)
METER GLUCOSE: 346 MG/DL (ref 74–99)
METER GLUCOSE: 355 MG/DL (ref 74–99)
ORGANISM: ABNORMAL
WOUND/ABSCESS: ABNORMAL
WOUND/ABSCESS: ABNORMAL

## 2021-07-10 PROCEDURE — 94640 AIRWAY INHALATION TREATMENT: CPT

## 2021-07-10 PROCEDURE — 6370000000 HC RX 637 (ALT 250 FOR IP): Performed by: FAMILY MEDICINE

## 2021-07-10 PROCEDURE — 82962 GLUCOSE BLOOD TEST: CPT

## 2021-07-10 PROCEDURE — 6360000002 HC RX W HCPCS: Performed by: FAMILY MEDICINE

## 2021-07-10 PROCEDURE — 2580000003 HC RX 258: Performed by: INTERNAL MEDICINE

## 2021-07-10 PROCEDURE — 6370000000 HC RX 637 (ALT 250 FOR IP): Performed by: INTERNAL MEDICINE

## 2021-07-10 PROCEDURE — 6360000002 HC RX W HCPCS: Performed by: INTERNAL MEDICINE

## 2021-07-10 PROCEDURE — 1200000000 HC SEMI PRIVATE

## 2021-07-10 PROCEDURE — 2700000000 HC OXYGEN THERAPY PER DAY

## 2021-07-10 PROCEDURE — 2580000003 HC RX 258: Performed by: FAMILY MEDICINE

## 2021-07-10 RX ORDER — CEFDINIR 300 MG/1
300 CAPSULE ORAL EVERY 12 HOURS SCHEDULED
Status: DISCONTINUED | OUTPATIENT
Start: 2021-07-10 | End: 2021-07-13 | Stop reason: HOSPADM

## 2021-07-10 RX ADMIN — INSULIN LISPRO 12 UNITS: 100 INJECTION, SOLUTION INTRAVENOUS; SUBCUTANEOUS at 16:48

## 2021-07-10 RX ADMIN — CEFDINIR 300 MG: 300 CAPSULE ORAL at 20:40

## 2021-07-10 RX ADMIN — INSULIN GLARGINE 45 UNITS: 100 INJECTION, SOLUTION SUBCUTANEOUS at 08:36

## 2021-07-10 RX ADMIN — INSULIN LISPRO 12 UNITS: 100 INJECTION, SOLUTION INTRAVENOUS; SUBCUTANEOUS at 12:09

## 2021-07-10 RX ADMIN — GABAPENTIN 600 MG: 300 CAPSULE ORAL at 12:09

## 2021-07-10 RX ADMIN — GABAPENTIN 600 MG: 300 CAPSULE ORAL at 20:33

## 2021-07-10 RX ADMIN — VANCOMYCIN HYDROCHLORIDE 1750 MG: 10 INJECTION, POWDER, LYOPHILIZED, FOR SOLUTION INTRAVENOUS at 20:38

## 2021-07-10 RX ADMIN — SPIRONOLACTONE 25 MG: 25 TABLET ORAL at 08:35

## 2021-07-10 RX ADMIN — MEROPENEM 1000 MG: 1 INJECTION, POWDER, FOR SOLUTION INTRAVENOUS at 12:48

## 2021-07-10 RX ADMIN — INSULIN GLARGINE 45 UNITS: 100 INJECTION, SOLUTION SUBCUTANEOUS at 20:42

## 2021-07-10 RX ADMIN — OXYCODONE AND ACETAMINOPHEN 1 TABLET: 5; 325 TABLET ORAL at 18:08

## 2021-07-10 RX ADMIN — MEROPENEM 1000 MG: 1 INJECTION, POWDER, FOR SOLUTION INTRAVENOUS at 04:42

## 2021-07-10 RX ADMIN — APIXABAN 5 MG: 5 TABLET, FILM COATED ORAL at 20:34

## 2021-07-10 RX ADMIN — IPRATROPIUM BROMIDE AND ALBUTEROL SULFATE 1 AMPULE: 2.5; .5 SOLUTION RESPIRATORY (INHALATION) at 10:40

## 2021-07-10 RX ADMIN — APIXABAN 5 MG: 5 TABLET, FILM COATED ORAL at 08:34

## 2021-07-10 RX ADMIN — INSULIN LISPRO 7 UNITS: 100 INJECTION, SOLUTION INTRAVENOUS; SUBCUTANEOUS at 20:41

## 2021-07-10 RX ADMIN — IPRATROPIUM BROMIDE AND ALBUTEROL SULFATE 1 AMPULE: 2.5; .5 SOLUTION RESPIRATORY (INHALATION) at 21:02

## 2021-07-10 RX ADMIN — FERROUS SULFATE TAB 325 MG (65 MG ELEMENTAL FE) 325 MG: 325 (65 FE) TAB at 08:34

## 2021-07-10 RX ADMIN — PANTOPRAZOLE SODIUM 40 MG: 40 TABLET, DELAYED RELEASE ORAL at 06:33

## 2021-07-10 RX ADMIN — OXYCODONE AND ACETAMINOPHEN 1 TABLET: 5; 325 TABLET ORAL at 22:52

## 2021-07-10 RX ADMIN — SODIUM CHLORIDE, PRESERVATIVE FREE 10 ML: 5 INJECTION INTRAVENOUS at 20:35

## 2021-07-10 RX ADMIN — HYDROXYZINE PAMOATE 50 MG: 50 CAPSULE ORAL at 20:34

## 2021-07-10 RX ADMIN — GABAPENTIN 600 MG: 300 CAPSULE ORAL at 08:34

## 2021-07-10 RX ADMIN — DILTIAZEM HYDROCHLORIDE 240 MG: 240 CAPSULE, EXTENDED RELEASE ORAL at 20:34

## 2021-07-10 RX ADMIN — DILTIAZEM HYDROCHLORIDE 240 MG: 240 CAPSULE, EXTENDED RELEASE ORAL at 08:34

## 2021-07-10 RX ADMIN — MICONAZOLE NITRATE: 20.6 POWDER TOPICAL at 20:39

## 2021-07-10 RX ADMIN — PETROLATUM: 420 OINTMENT TOPICAL at 08:36

## 2021-07-10 RX ADMIN — METOPROLOL SUCCINATE 100 MG: 100 TABLET, EXTENDED RELEASE ORAL at 20:34

## 2021-07-10 RX ADMIN — MICONAZOLE NITRATE: 20.6 POWDER TOPICAL at 08:35

## 2021-07-10 RX ADMIN — ESCITALOPRAM 20 MG: 10 TABLET, FILM COATED ORAL at 08:34

## 2021-07-10 RX ADMIN — SODIUM CHLORIDE, PRESERVATIVE FREE 10 ML: 5 INJECTION INTRAVENOUS at 08:33

## 2021-07-10 RX ADMIN — INSULIN LISPRO 9 UNITS: 100 INJECTION, SOLUTION INTRAVENOUS; SUBCUTANEOUS at 08:35

## 2021-07-10 RX ADMIN — OXYCODONE AND ACETAMINOPHEN 1 TABLET: 5; 325 TABLET ORAL at 13:10

## 2021-07-10 RX ADMIN — IPRATROPIUM BROMIDE AND ALBUTEROL SULFATE 1 AMPULE: 2.5; .5 SOLUTION RESPIRATORY (INHALATION) at 16:35

## 2021-07-10 RX ADMIN — TRAZODONE HYDROCHLORIDE 50 MG: 50 TABLET ORAL at 20:34

## 2021-07-10 RX ADMIN — EZETIMIBE 10 MG: 10 TABLET ORAL at 20:34

## 2021-07-10 RX ADMIN — METOPROLOL SUCCINATE 100 MG: 100 TABLET, EXTENDED RELEASE ORAL at 08:34

## 2021-07-10 ASSESSMENT — PAIN SCALES - GENERAL
PAINLEVEL_OUTOF10: 5
PAINLEVEL_OUTOF10: 4
PAINLEVEL_OUTOF10: 0
PAINLEVEL_OUTOF10: 6
PAINLEVEL_OUTOF10: 5
PAINLEVEL_OUTOF10: 0
PAINLEVEL_OUTOF10: 7

## 2021-07-10 ASSESSMENT — PAIN DESCRIPTION - ONSET
ONSET: ON-GOING

## 2021-07-10 ASSESSMENT — PAIN DESCRIPTION - FREQUENCY
FREQUENCY: CONTINUOUS

## 2021-07-10 ASSESSMENT — PAIN DESCRIPTION - DESCRIPTORS
DESCRIPTORS: ACHING;DISCOMFORT;SORE
DESCRIPTORS: ACHING;DISCOMFORT
DESCRIPTORS: ACHING;DISCOMFORT

## 2021-07-10 ASSESSMENT — PAIN DESCRIPTION - PAIN TYPE
TYPE: ACUTE PAIN

## 2021-07-10 ASSESSMENT — PAIN DESCRIPTION - ORIENTATION
ORIENTATION: RIGHT;LEFT

## 2021-07-10 ASSESSMENT — PAIN DESCRIPTION - PROGRESSION
CLINICAL_PROGRESSION: NOT CHANGED

## 2021-07-10 ASSESSMENT — PAIN DESCRIPTION - LOCATION
LOCATION: LEG

## 2021-07-10 ASSESSMENT — PAIN - FUNCTIONAL ASSESSMENT
PAIN_FUNCTIONAL_ASSESSMENT: ACTIVITIES ARE NOT PREVENTED
PAIN_FUNCTIONAL_ASSESSMENT: PREVENTS OR INTERFERES SOME ACTIVE ACTIVITIES AND ADLS
PAIN_FUNCTIONAL_ASSESSMENT: PREVENTS OR INTERFERES SOME ACTIVE ACTIVITIES AND ADLS

## 2021-07-10 NOTE — PROGRESS NOTES
Pharmacy Consultation Note  (Antibiotic Dosing and Monitoring)    Initial consult date:    Consulting physician/provider: Franklyn Tinajero and Jose Martin ()  Drug(s): vancomycin   Indication: CSSSI; B/L LE cellulitis  Age/Gender IBW DW  Allergy Information   77 y.o. female; 160 cm, 117.9 kg 52.9 kg 78.9 kg  Statins           Date  WBC BUN/CR Drug/Dose Time   Given Level(s)   (Time) Comments    7.7 27/0.8 X X     7/6 7.8 23/0.7 vanco 2000 mg q12h 1010, 2242     /7 5.8 21/0.8 vanco 1500 mg q24h 2211 Random level = 19.9 mCg/mL @ 0618    7/8 6.9 27/1 vanco 1500 mg q24h 2235     7/9 7.7 33/1 vanco 1500 mg q24h 2316 Trough = 10.6 mCg/mL @ 2105    /10   Vanco 1750 mg q24h        Estimated Creatinine Clearance: 74 mL/min (based on SCr of 1 mg/dL). Intake/Output Summary (Last 24 hours) at 7/10/2021 0711  Last data filed at 7/10/2021 0610  Gross per 24 hour   Intake 1100 ml   Output 1350 ml   Net -250 ml   UO = 0.4 mL/kg/hr (1350 mL)    Temp max: Temp (24hrs), Av.6 °F (37 °C), Min:97.9 °F (36.6 °C), Max:99.3 °F (37.4 °C)    Assessment:  · Consulted by Dr. Leisa Servin to dose/monitor vancomycin. · Goal trough level:  15-20 mCg/mL; AUC/MALKA = 400-600 mg/L-hr. · 64 yo/F admitted for B/L LE pain, swelling, and redness. Has B/L open wounds, RLE wound is draining. · PMH: ESBL pyelonepritis/bacteremia in Apr-May (ertapenem). · Empiric ATBs (vanco and meropenem) started on this admission. · ID consulted and wants Pharmacy to continue the consult (d/w Dr. Rosie Cedeno today. · : day #2 vanco, day #3 meropenem. Vanc random level = 19.9 this AM; low UO documented. Afebrile, WBC WNL, BC's NGTD; Wound Cx (): mixed GNR, growing Proteus species. · : day #4 vanco, day #5 meropenem. +Staph aureus from Wound Cx (), (S)-pending. Low UO documented, but incomplete data and obese patient.   · 7/10: day #5 vanco, trough from  on  was 10.6 mcg/mL, AUC/MALKA 384    Plan:  · Will increase to vancomycin 1750 mg q24h: predicted AUC/MALKA = 445,   Tr = 12 mCg/mL. · Will recheck level as needed  · Pharmacist will follow and monitor/adjust dosing as necessary.     Darylene Milks, PharmD, [facility]  7/10/2021  7:33 AM

## 2021-07-10 NOTE — PLAN OF CARE
Problem: Skin Integrity:  Goal: Will show no infection signs and symptoms  Description: Will show no infection signs and symptoms  7/10/2021 1101 by Tami Mclain RN  Outcome: Met This Shift  7/10/2021 0023 by Kathy Spring RN  Outcome: Met This Shift  Goal: Absence of new skin breakdown  Description: Absence of new skin breakdown  7/10/2021 1101 by Tami Mclain RN  Outcome: Met This Shift  7/10/2021 0023 by Kathy Spring RN  Outcome: Met This Shift     Problem: Falls - Risk of:  Goal: Will remain free from falls  Description: Will remain free from falls  7/10/2021 1101 by Tami Mclain RN  Outcome: Met This Shift  7/10/2021 0023 by Kathy Spring RN  Outcome: Met This Shift  Goal: Absence of physical injury  Description: Absence of physical injury  7/10/2021 1101 by Tami Mclain RN  Outcome: Met This Shift  7/10/2021 0023 by Kathy Spring RN  Outcome: Met This Shift     Problem: Pain:  Goal: Pain level will decrease  Description: Pain level will decrease  7/10/2021 1101 by Tami Mclain RN  Outcome: Met This Shift  7/10/2021 0023 by Kathy Spring RN  Outcome: Met This Shift  Goal: Control of acute pain  Description: Control of acute pain  7/10/2021 1101 by Tami Mclain RN  Outcome: Met This Shift  7/10/2021 0023 by Kathy Spring RN  Outcome: Met This Shift  Goal: Control of chronic pain  Description: Control of chronic pain  7/10/2021 1101 by Tami Mclain RN  Outcome: Met This Shift  7/10/2021 0023 by Kathy Spring RN  Outcome: Met This Shift

## 2021-07-10 NOTE — PROGRESS NOTES
Department of Internal Medicine  Infectious Diseases  Progress  Note    C/C :  Chronic lymphedema, leg wound infection     Denies fever or chills  Reports leg pain- improved   Less drainage  Afebrile       Current Facility-Administered Medications   Medication Dose Route Frequency Provider Last Rate Last Admin    vancomycin (VANCOCIN) 1,750 mg in dextrose 5 % 500 mL IVPB  1,750 mg Intravenous Q24H Marquis Philippe MD        ipratropium-albuterol (DUONEB) nebulizer solution 1 ampule  1 ampule Inhalation Q4H WA Girma Conklin, DO   1 ampule at 07/10/21 1040    white petrolatum ointment   Topical Daily Girma Conklin, DO   Given at 07/10/21 0836    hydrOXYzine (VISTARIL) capsule 50 mg  50 mg Oral Q4H PRN Sanna Freire,         glucose (GLUTOSE) 40 % oral gel 15 g  15 g Oral PRN Girma Conklin, DO        dextrose 50 % IV solution  12.5 g Intravenous PRN Girma Conklin, DO        glucagon (rDNA) injection 1 mg  1 mg Intramuscular PRN [facility]        dextrose 5 % solution  100 mL/hr Intravenous PRN Girma Conklin, DO        insulin lispro (HUMALOG) injection vial 0-18 Units  0-18 Units Subcutaneous TID WC Girma Conklin, DO   12 Units at 07/10/21 1209    insulin lispro (HUMALOG) injection vial 0-9 Units  0-9 Units Subcutaneous Nightly Girma Conklin, DO   9 Units at 07/09/21 2203    oxyCODONE-acetaminophen (PERCOCET) 5-325 MG per tablet 1 tablet  1 tablet Oral Q4H PRN Abraham Kayser, MD   1 tablet at 07/10/21 1310    meropenem (MERREM) 1,000 mg in sodium chloride 0.9 % 100 mL IVPB (mini-bag)  1,000 mg Intravenous Carla Elizabeth MD   Stopped at 07/10/21 1310    sodium chloride flush 0.9 % injection 5-40 mL  5-40 mL Intravenous 2 times per day Abraham Kayser, MD   10 mL at 07/10/21 0833    sodium chloride flush 0.9 % injection 5-40 mL  5-40 mL Intravenous PRN Marquise Tinajero MD        0.9 % sodium chloride infusion  25 mL Intravenous PRN Abraham Kayser, MD        ondansetron (ZOFRAN-ODT) disintegrating tablet 4 mg  4 mg Oral Q8H PRN Santosh Leija MD        Or    ondansetron (ZOFRAN) injection 4 mg  4 mg Intravenous Q6H PRN Santosh Leija MD        polyethylene glycol (GLYCOLAX) packet 17 g  17 g Oral Daily PRN Santosh Leija MD        acetaminophen (TYLENOL) tablet 650 mg  650 mg Oral Q6H PRN Santosh Leija MD   650 mg at 07/08/21 7733    Or    acetaminophen (TYLENOL) suppository 650 mg  650 mg Rectal Q6H PRN Santosh Leija MD        apixaban (ELIQUIS) tablet 5 mg  5 mg Oral BID Santosh Leija MD   5 mg at 07/10/21 0834    dilTIAZem (CARDIZEM CD) extended release capsule 240 mg  240 mg Oral BID Santosh Leija MD   240 mg at 07/10/21 0834    escitalopram (LEXAPRO) tablet 20 mg  20 mg Oral Daily Santosh Leija MD   20 mg at 07/10/21 0834    ezetimibe (ZETIA) tablet 10 mg  10 mg Oral Nightly Santosh Leija MD   10 mg at 07/09/21 2149    ferrous sulfate (IRON 325) tablet 325 mg  325 mg Oral Daily with breakfast Santosh Leija MD   325 mg at 07/10/21 0834    gabapentin (NEURONTIN) capsule 600 mg  600 mg Oral TID Santosh Leija MD   600 mg at 07/10/21 1209    insulin glargine (LANTUS) injection vial 45 Units  45 Units Subcutaneous BID Santosh Leija MD   45 Units at 07/10/21 0836    pantoprazole (PROTONIX) tablet 40 mg  40 mg Oral QAM AC Santosh Leija MD   40 mg at 07/10/21 1191    metoprolol succinate (TOPROL XL) extended release tablet 100 mg  100 mg Oral BID Santosh Leija MD   100 mg at 07/10/21 0834    miconazole (MICOTIN) 2 % powder   Topical BID Santosh Leija MD   Given at 07/10/21 0835    white petrolatum ointment   Topical TID PRN Santosh Leija MD   Given at 07/09/21 0837    spironolactone (ALDACTONE) tablet 25 mg  25 mg Oral Daily Santosh Leija MD   25 mg at 07/10/21 0835    traZODone (DESYREL) tablet 50 mg  50 mg Oral Nightly Santosh Leija MD   50 mg at 07/09/21 3611     Allergies   Allergen Reactions    Statins Other (See Comments)     Muscle pains         REVIEW OF SYSTEMS:    CONSTITUTIONAL:  Denies fever, chill or rigors  HEENT: denies blurring of vision or double vision, denies hearing problem  RESPIRATORY: denies cough, shortness of breath, sputum expectoration, chest pain. CARDIOVASCULAR:  Denies palpitation  GASTROINTESTINAL:  Denies abdomen pain, diarrhea or constipation. GENITOURINARY:  Denies burning urination or frequency of urination  INTEGUMENT: Bilateral leg wounds   HEMATOLOGIC/LYMPHATIC:  Denies lymph node swelling, gum bleeding or easy bruising. MUSCULOSKELETAL: Bilateral leg swelling, redness   NEUROLOGICAL:  Denies light headed, dizziness      PHYSICAL EXAM:      Vitals:     /75   Pulse 76   Temp 98.1 °F (36.7 °C) (Oral)   Resp 18   Ht 5' 3\" (1.6 m)   Wt 299 lb 12.8 oz (136 kg)   LMP  (LMP Unknown)   SpO2 97%   BMI 53.11 kg/m²     General Appearance:    Awake, alert , morbidly obese    Head:    Normocephalic, atraumatic   Eyes:    No pallor, no icterus,   Ears:    No obvious deformity or drainage.    Nose:   No nasal drainage   Throat:   Mucosa moist, no oral thrush   Neck:   Supple, no lymphadenopathy   Back:     no CVA tenderness   Lungs:     Clear to auscultation bilaterally, no wheeze    Heart:    Regular rate and rhythm, no murmur   Abdomen:     Soft, non-tender, bowel sounds present    Extremities:  Bilateral chronic lymphedema, wounds, ACE wrap on    Pulses:   Dorsalis pedis palpable    Skin:  less drainage             DATA:      CBC with Differential:      Lab Results   Component Value Date    WBC 7.7 07/09/2021    RBC 3.80 07/09/2021    HGB 9.4 07/09/2021    HCT 31.4 07/09/2021     07/09/2021    MCV 82.6 07/09/2021    MCH 24.7 07/09/2021    MCHC 29.9 07/09/2021    RDW 15.6 07/09/2021    NRBC 0.0 12/04/2018    SEGSPCT 85 01/29/2014    METASPCT 3.5 12/04/2018    LYMPHOPCT 17.6 07/09/2021    MONOPCT 8.2 07/09/2021    MYELOPCT 2.6 12/04/2018    BASOPCT 0.5 07/09/2021    MONOSABS 0.63 07/09/2021    LYMPHSABS 1.35 07/09/2021    EOSABS 0.20 07/09/2021    BASOSABS 0.04 07/09/2021       CMP     Lab Results   Component Value Date     07/09/2021    K 4.4 07/09/2021    K 4.3 07/06/2021    CL 95 07/09/2021    CO2 32 07/09/2021    BUN 33 07/09/2021    CREATININE 1.0 07/09/2021    GFRAA >60 07/09/2021    LABGLOM 55 07/09/2021    GLUCOSE 194 07/09/2021    GLUCOSE 181 12/16/2011    PROT 6.1 07/07/2021    LABALBU 3.5 07/07/2021    LABALBU 4.5 11/02/2011    CALCIUM 9.1 07/09/2021    BILITOT 0.2 07/07/2021    ALKPHOS 94 07/07/2021    AST 8 07/07/2021    ALT 9 07/07/2021         Hepatic Function Panel:    Lab Results   Component Value Date    ALKPHOS 94 07/07/2021    ALT 9 07/07/2021    AST 8 07/07/2021    PROT 6.1 07/07/2021    BILITOT 0.2 07/07/2021    BILIDIR <0.2 12/26/2019    IBILI see below 12/26/2019    LABALBU 3.5 07/07/2021    LABALBU 4.5 11/02/2011       PT/INR:    Lab Results   Component Value Date    PROTIME 19.5 04/29/2021    INR 1.8 04/29/2021       TSH:    Lab Results   Component Value Date    TSH 0.575 01/15/2021       U/A:    Lab Results   Component Value Date    NITRITE negative 07/26/2018    COLORU Yellow 07/06/2021    PHUR 6.0 07/06/2021    WBCUA 1-3 07/06/2021    RBCUA 2-5 07/06/2021    YEAST Present 05/28/2020    BACTERIA RARE 07/06/2021    CLARITYU Clear 07/06/2021    SPECGRAV 1.020 07/06/2021    LEUKOCYTESUR Negative 07/06/2021    UROBILINOGEN 0.2 07/06/2021    BILIRUBINUR Negative 07/06/2021    BILIRUBINUR negative 07/26/2018    BLOODU SMALL 07/06/2021    GLUCOSEU >=1000 07/06/2021    AMORPHOUS FEW 08/14/2020       Vanco  19.9     MICROBIOLOGY:    Wound cx -      Proteus species   Mixed Gram negative rods   Corynebacteria   Abnormal       Organism Staphylococcus aureusAbnormal     WOUND/ABSCESS --    Light growth   Methicillin resistant Staph aureus isolated.  Most Methicillin   resistant Staphylococcus are usually resistant to multiple   antibiotics including other B-Lactams, Aminoglycosides,   Macrolides, Clindamycin and Tetracycline. Contact isolation   is indicated. Narrative:       Vancomycin trough level - 10.6    IMPRESSION:     1. Bilateral leg lymphedema, wound infection, cellulitis   2. Morbid obesity         RECOMMENDATIONS:      1. Vancomycin 1500 grams IV q 12 hrs ( clinical pharmacy following )   2. Meropenem to omnicef 300 mg po q 12 hrs    3.  Contact isolation   D/C on oral abx

## 2021-07-10 NOTE — PROGRESS NOTES
Hospitalist Progress Note    Chief complaint:  Chief Complaint   Patient presents with    Leg Swelling     pt has bilateral leg pain with swelling and redness noted to lower legs        Admit date:  7/5/2021    Days in hospital:    4    Synopsis :    Patient is  77years old female presents with a chief complaint weakness and swelling. Patient has history of hypertension hyperlipidemia diabetes diastolic congestive heart failure atrial fibrillation on Eliquis chronic respiratory failure on 3 L oxygen chronic edema with lower extremity pain, DVT on Eliquis COPD asthma exactly depression. Patient was recently in the hospital with ESBL bacteremia due to pyelonephritis sepsis encephalopathy and worsening respiratory failure, CT scan at that time showed enlarging right adrenal mass with possible mass in the left kidney. This admission patient coming with lower extremity increasing swelling and redness, patient was admitted for bilateral lower extremity cellulitis. Subjective:   No acute events overnight, this morning patient seen at bedside no acute distress. Patient is not reporting any chest pain. No obvious difficulty in breathing at this point and no worsening of any dyspnea reported. No abdominal pain or discomfort. No dizziness. No new complaint. All questions answered in detail at bedside. Plan discussed with nursing staff in detail.     Objective:    Physical examination:  VS: /62   Pulse 82   Temp 97.9 °F (36.6 °C) (Oral)   Resp 18   Ht 5' 3\" (1.6 m)   Wt 300 lb (136.1 kg)   LMP  (LMP Unknown)   SpO2 99%   BMI 53.14 kg/m²   I/O:     Intake/Output Summary (Last 24 hours) at 7/9/2021 2152  Last data filed at 7/9/2021 1230  Gross per 24 hour   Intake 1100 ml   Output 300 ml   Net 800 ml     General Appearance: alert and oriented to person, place and time, in no acute distress  HEENT: normocephalic and atraumatic, pupils equal, round, and reactive to light, Ssupple and non-tender without mass, no thyromegaly   Cardiovascular: normal rate, regular rhythm, normal S1 and S2, no murmurs, rubs, clicks, or gallops, distal pulses intact, no carotid bruits, no JVD  Pulmonary/Chest: clear to auscultation bilaterally- no wheezes, rales or rhonchi, normal air movement, no respiratory distress  Abdomen: soft, non-tender, non-distended, normal bowel sounds, no masses   Extremities: Randal leg lymphedema, legs wrapped with compression band  Musculoskeletal: normal range of motion, no joint swelling, deformity or tenderness  Neurological: alert, oriented, normal speech, no focal findings or movement disorder noted    Medications:  Scheduled Meds:   vancomycin  1,500 mg Intravenous Q24H    ipratropium-albuterol  1 ampule Inhalation Q4H WA    white petrolatum   Topical Daily    insulin lispro  0-18 Units Subcutaneous TID WC    insulin lispro  0-9 Units Subcutaneous Nightly    meropenem  1,000 mg Intravenous Q8H    sodium chloride flush  5-40 mL Intravenous 2 times per day    apixaban  5 mg Oral BID    dilTIAZem  240 mg Oral BID    escitalopram  20 mg Oral Daily    ezetimibe  10 mg Oral Nightly    ferrous sulfate  325 mg Oral Daily with breakfast    gabapentin  600 mg Oral TID    insulin glargine  45 Units Subcutaneous BID    pantoprazole  40 mg Oral QAM AC    metoprolol succinate  100 mg Oral BID    miconazole   Topical BID    spironolactone  25 mg Oral Daily    traZODone  50 mg Oral Nightly       PRN Meds:  hydrOXYzine, glucose, dextrose, glucagon (rDNA), dextrose, oxyCODONE-acetaminophen, sodium chloride flush, sodium chloride, ondansetron **OR** ondansetron, polyethylene glycol, acetaminophen **OR** acetaminophen, white petrolatum    IV:   dextrose      sodium chloride         LABS:  Recent Results (from the past 24 hour(s))   POCT Glucose    Collection Time: 07/09/21  6:05 AM   Result Value Ref Range    Meter Glucose 218 (H) 74 - 99 mg/dL CBC WITH AUTO DIFFERENTIAL    Collection Time: 07/09/21  7:02 AM   Result Value Ref Range    WBC 7.7 4.5 - 11.5 E9/L    RBC 3.80 3.50 - 5.50 E12/L    Hemoglobin 9.4 (L) 11.5 - 15.5 g/dL    Hematocrit 31.4 (L) 34.0 - 48.0 %    MCV 82.6 80.0 - 99.9 fL    MCH 24.7 (L) 26.0 - 35.0 pg    MCHC 29.9 (L) 32.0 - 34.5 %    RDW 15.6 (H) 11.5 - 15.0 fL    Platelets 360 345 - 975 E9/L    MPV 9.7 7.0 - 12.0 fL    Neutrophils % 70.8 43.0 - 80.0 %    Immature Granulocytes % 0.3 0.0 - 5.0 %    Lymphocytes % 17.6 (L) 20.0 - 42.0 %    Monocytes % 8.2 2.0 - 12.0 %    Eosinophils % 2.6 0.0 - 6.0 %    Basophils % 0.5 0.0 - 2.0 %    Neutrophils Absolute 5.41 1.80 - 7.30 E9/L    Immature Granulocytes # 0.02 E9/L    Lymphocytes Absolute 1.35 (L) 1.50 - 4.00 E9/L    Monocytes Absolute 0.63 0.10 - 0.95 E9/L    Eosinophils Absolute 0.20 0.05 - 0.50 E9/L    Basophils Absolute 0.04 0.00 - 0.20 A6/P   Basic metabolic panel    Collection Time: 07/09/21  7:02 AM   Result Value Ref Range    Sodium 136 132 - 146 mmol/L    Potassium 4.4 3.5 - 5.0 mmol/L    Chloride 95 (L) 98 - 107 mmol/L    CO2 32 (H) 22 - 29 mmol/L    Anion Gap 9 7 - 16 mmol/L    Glucose 194 (H) 74 - 99 mg/dL    BUN 33 (H) 6 - 23 mg/dL    CREATININE 1.0 0.5 - 1.0 mg/dL    GFR Non-African American 55 >=60 mL/min/1.73    GFR African American >60     Calcium 9.1 8.6 - 10.2 mg/dL   Magnesium    Collection Time: 07/09/21  7:02 AM   Result Value Ref Range    Magnesium 2.7 (H) 1.6 - 2.6 mg/dL   POCT Glucose    Collection Time: 07/09/21 12:43 PM   Result Value Ref Range    Meter Glucose 263 (H) 74 - 99 mg/dL   POCT Glucose    Collection Time: 07/09/21  4:53 PM   Result Value Ref Range    Meter Glucose 366 (H) 74 - 99 mg/dL   POCT Glucose    Collection Time: 07/09/21  8:11 PM   Result Value Ref Range    Meter Glucose 425 (H) 74 - 99 mg/dL   VANCOMYCIN, TROUGH    Collection Time: 07/09/21  9:05 PM   Result Value Ref Range    Vancomycin Tr 10.6 5.0 - 16.0 mcg/mL       RADIOLOGY:  XR TIBIA FIBULA LEFT (2 VIEWS)    Result Date: 7/6/2021  EXAMINATION: 4 XRAY VIEWS OF THE LEFT TIBIA AND FIBULA 7/5/2021 11:00 pm COMPARISON: None. HISTORY: ORDERING SYSTEM PROVIDED HISTORY: osteomyelitis TECHNOLOGIST PROVIDED HISTORY: Reason for exam:->osteomyelitis What reading provider will be dictating this exam?->CRC FINDINGS: Partial visualization of knee prosthesis. Extensive diffuse soft tissue edema. No other radiopaque foreign body. Advanced degenerative changes of the ankle and visualized foot. Prominent plantar and Achilles calcaneal spurs. No dislocation, bony destruction or distinct acute fracture. Extensive soft tissue edema. This could be reactive or due to cellulitis. No plain radiographic evidence of osteomyelitis. MRI would be useful if symptoms persist. Other chronic appearing findings. XR TIBIA FIBULA RIGHT (2 VIEWS)    Result Date: 7/6/2021  EXAMINATION: 4 XRAY VIEWS OF THE RIGHT TIBIA AND FIBULA 7/5/2021 11:01 pm COMPARISON: None. HISTORY: ORDERING SYSTEM PROVIDED HISTORY: osteomyelitis TECHNOLOGIST PROVIDED HISTORY: Reason for exam:->osteomyelitis What reading provider will be dictating this exam?->CRC FINDINGS: Extensive diffuse soft tissue edema. Old-appearing fracture deformities of the distal diaphysis of the tibia and fibula. Advanced degenerative changes of the knee and ankle. No radiopaque foreign body. No bony destruction. Diffuse soft tissue edema which could be reactive or due to cellulitis. No plain radiographic evidence of osteomyelitis. MRI would be useful if symptoms persist. Other chronic appearing findings. Assessment and plan:   Active Problems:    Anemia    Chronic anticoagulation    Uncontrolled diabetes mellitus (HCC)    Essential hypertension    Chronic diastolic heart failure (HCC)    Physical deconditioning    Lymphedema of both lower extremities    Morbid obesity with BMI of 45.0-49.9, adult (HCC)    Ulcers of both lower legs ([facility])    Atrial fibrillation ([facility] [address]    Infection due to extended-spectrum beta-lactamase-producing Escherichia coli    Bilateral cellulitis of lower leg    Ambulatory dysfunction    Chronic respiratory failure with hypercapnia (HCC)    Moderate pulmonary hypertension (HCC)    QT prolongation    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[facility] [address]  Resolved Problems:    * No resolved hospital problems. *    Impression  Bilateral lower extremity cellulitis  -Bilateral leg lymphedema  -History of HFpEF  -Ambulatory dysfunction  -Chronic atrial fibrillation currently rate controlled  -Chronically dependent on 3 L O2  -Leg ulcers  -Poorly controlled type 2 diabetes  -Hypertension    Plan  Cont IV abx as per ID recs   Cont rest of medial management  Will re-evaluate tomorrow        DVT prophylaxis: Subcutaneous heparin  CODE STATUS: Patient is a full code  Discharge plan: Patient can be discharged next 3 to 4 days to home with and without home health.,  Pending clinical improvement, pending work-up and clearance by consulting services. Electronically signed by Kadi Ely MD on 7/9/2021 at 9:52 PM  NOTE: This report was transcribed using voice recognition software. Every effort was made to ensure accuracy; however, inadvertent computerized transcription errors may be present.

## 2021-07-11 LAB
METER GLUCOSE: 196 MG/DL (ref 74–99)
METER GLUCOSE: 226 MG/DL (ref 74–99)
METER GLUCOSE: 228 MG/DL (ref 74–99)
METER GLUCOSE: 260 MG/DL (ref 74–99)

## 2021-07-11 PROCEDURE — 6370000000 HC RX 637 (ALT 250 FOR IP): Performed by: FAMILY MEDICINE

## 2021-07-11 PROCEDURE — 6370000000 HC RX 637 (ALT 250 FOR IP): Performed by: INTERNAL MEDICINE

## 2021-07-11 PROCEDURE — 94640 AIRWAY INHALATION TREATMENT: CPT

## 2021-07-11 PROCEDURE — 2700000000 HC OXYGEN THERAPY PER DAY

## 2021-07-11 PROCEDURE — 82962 GLUCOSE BLOOD TEST: CPT

## 2021-07-11 PROCEDURE — 1200000000 HC SEMI PRIVATE

## 2021-07-11 RX ORDER — LINEZOLID 600 MG/1
600 TABLET, FILM COATED ORAL EVERY 12 HOURS SCHEDULED
Status: DISCONTINUED | OUTPATIENT
Start: 2021-07-11 | End: 2021-07-13 | Stop reason: HOSPADM

## 2021-07-11 RX ADMIN — CEFDINIR 300 MG: 300 CAPSULE ORAL at 21:33

## 2021-07-11 RX ADMIN — ESCITALOPRAM 20 MG: 10 TABLET, FILM COATED ORAL at 09:40

## 2021-07-11 RX ADMIN — HYDROXYZINE PAMOATE 50 MG: 50 CAPSULE ORAL at 09:40

## 2021-07-11 RX ADMIN — METOPROLOL SUCCINATE 100 MG: 100 TABLET, EXTENDED RELEASE ORAL at 09:39

## 2021-07-11 RX ADMIN — IPRATROPIUM BROMIDE AND ALBUTEROL SULFATE 1 AMPULE: 2.5; .5 SOLUTION RESPIRATORY (INHALATION) at 16:41

## 2021-07-11 RX ADMIN — MICONAZOLE NITRATE: 20.6 POWDER TOPICAL at 09:40

## 2021-07-11 RX ADMIN — PANTOPRAZOLE SODIUM 40 MG: 40 TABLET, DELAYED RELEASE ORAL at 06:45

## 2021-07-11 RX ADMIN — DILTIAZEM HYDROCHLORIDE 240 MG: 240 CAPSULE, EXTENDED RELEASE ORAL at 21:33

## 2021-07-11 RX ADMIN — LINEZOLID 600 MG: 600 TABLET ORAL at 21:32

## 2021-07-11 RX ADMIN — PETROLATUM: 420 OINTMENT TOPICAL at 09:40

## 2021-07-11 RX ADMIN — APIXABAN 5 MG: 5 TABLET, FILM COATED ORAL at 21:32

## 2021-07-11 RX ADMIN — GABAPENTIN 600 MG: 300 CAPSULE ORAL at 09:39

## 2021-07-11 RX ADMIN — OXYCODONE AND ACETAMINOPHEN 1 TABLET: 5; 325 TABLET ORAL at 17:02

## 2021-07-11 RX ADMIN — IPRATROPIUM BROMIDE AND ALBUTEROL SULFATE 1 AMPULE: 2.5; .5 SOLUTION RESPIRATORY (INHALATION) at 22:29

## 2021-07-11 RX ADMIN — MICONAZOLE NITRATE: 20.6 POWDER TOPICAL at 21:36

## 2021-07-11 RX ADMIN — TRAZODONE HYDROCHLORIDE 50 MG: 50 TABLET ORAL at 21:33

## 2021-07-11 RX ADMIN — FERROUS SULFATE TAB 325 MG (65 MG ELEMENTAL FE) 325 MG: 325 (65 FE) TAB at 09:39

## 2021-07-11 RX ADMIN — OXYCODONE AND ACETAMINOPHEN 1 TABLET: 5; 325 TABLET ORAL at 12:37

## 2021-07-11 RX ADMIN — IPRATROPIUM BROMIDE AND ALBUTEROL SULFATE 1 AMPULE: 2.5; .5 SOLUTION RESPIRATORY (INHALATION) at 12:49

## 2021-07-11 RX ADMIN — APIXABAN 5 MG: 5 TABLET, FILM COATED ORAL at 09:39

## 2021-07-11 RX ADMIN — EZETIMIBE 10 MG: 10 TABLET ORAL at 21:33

## 2021-07-11 RX ADMIN — GABAPENTIN 600 MG: 300 CAPSULE ORAL at 12:23

## 2021-07-11 RX ADMIN — INSULIN GLARGINE 45 UNITS: 100 INJECTION, SOLUTION SUBCUTANEOUS at 09:39

## 2021-07-11 RX ADMIN — INSULIN GLARGINE 45 UNITS: 100 INJECTION, SOLUTION SUBCUTANEOUS at 21:34

## 2021-07-11 RX ADMIN — GABAPENTIN 600 MG: 300 CAPSULE ORAL at 21:32

## 2021-07-11 RX ADMIN — INSULIN LISPRO 6 UNITS: 100 INJECTION, SOLUTION INTRAVENOUS; SUBCUTANEOUS at 17:02

## 2021-07-11 RX ADMIN — INSULIN LISPRO 6 UNITS: 100 INJECTION, SOLUTION INTRAVENOUS; SUBCUTANEOUS at 12:23

## 2021-07-11 RX ADMIN — INSULIN LISPRO 3 UNITS: 100 INJECTION, SOLUTION INTRAVENOUS; SUBCUTANEOUS at 09:38

## 2021-07-11 RX ADMIN — DILTIAZEM HYDROCHLORIDE 240 MG: 240 CAPSULE, EXTENDED RELEASE ORAL at 09:39

## 2021-07-11 RX ADMIN — METOPROLOL SUCCINATE 100 MG: 100 TABLET, EXTENDED RELEASE ORAL at 21:32

## 2021-07-11 RX ADMIN — CEFDINIR 300 MG: 300 CAPSULE ORAL at 09:40

## 2021-07-11 RX ADMIN — INSULIN LISPRO 5 UNITS: 100 INJECTION, SOLUTION INTRAVENOUS; SUBCUTANEOUS at 21:33

## 2021-07-11 RX ADMIN — IPRATROPIUM BROMIDE AND ALBUTEROL SULFATE 1 AMPULE: 2.5; .5 SOLUTION RESPIRATORY (INHALATION) at 08:57

## 2021-07-11 RX ADMIN — SPIRONOLACTONE 25 MG: 25 TABLET ORAL at 10:05

## 2021-07-11 ASSESSMENT — PAIN SCALES - GENERAL
PAINLEVEL_OUTOF10: 6
PAINLEVEL_OUTOF10: 0
PAINLEVEL_OUTOF10: 6
PAINLEVEL_OUTOF10: 0
PAINLEVEL_OUTOF10: 0
PAINLEVEL_OUTOF10: 7
PAINLEVEL_OUTOF10: 7

## 2021-07-11 ASSESSMENT — PAIN DESCRIPTION - PROGRESSION
CLINICAL_PROGRESSION: NOT CHANGED

## 2021-07-11 ASSESSMENT — PAIN DESCRIPTION - PAIN TYPE
TYPE: ACUTE PAIN

## 2021-07-11 ASSESSMENT — PAIN DESCRIPTION - FREQUENCY
FREQUENCY: CONTINUOUS

## 2021-07-11 ASSESSMENT — PAIN DESCRIPTION - DESCRIPTORS
DESCRIPTORS: ACHING
DESCRIPTORS: ACHING;DISCOMFORT;SORE
DESCRIPTORS: ACHING;DISCOMFORT;SORE

## 2021-07-11 ASSESSMENT — PAIN DESCRIPTION - LOCATION
LOCATION: LEG

## 2021-07-11 ASSESSMENT — PAIN DESCRIPTION - ONSET
ONSET: ON-GOING

## 2021-07-11 ASSESSMENT — PAIN DESCRIPTION - ORIENTATION
ORIENTATION: RIGHT;LEFT

## 2021-07-11 NOTE — PROGRESS NOTES
Hospitalist Progress Note    Chief complaint:  Chief Complaint   Patient presents with    Leg Swelling     pt has bilateral leg pain with swelling and redness noted to lower legs        Admit date:  7/5/2021    Days in hospital:    5    Synopsis :    Patient is  77years old female presents with a chief complaint weakness and swelling. Patient has history of hypertension hyperlipidemia diabetes diastolic congestive heart failure atrial fibrillation on Eliquis chronic respiratory failure on 3 L oxygen chronic edema with lower extremity pain, DVT on Eliquis COPD asthma exactly depression. Patient was recently in the hospital with ESBL bacteremia due to pyelonephritis sepsis encephalopathy and worsening respiratory failure, CT scan at that time showed enlarging right adrenal mass with possible mass in the left kidney. This admission patient coming with lower extremity increasing swelling and redness, patient was admitted for bilateral lower extremity cellulitis. Subjective:   No acute events overnight, this morning patient seen at bedside no acute distress. Patient is not reporting any chest pain. No obvious difficulty in breathing at this point and no worsening of any dyspnea reported. No abdominal pain or discomfort. No dizziness. No new complaint. All questions answered in detail at bedside. Plan discussed with nursing staff in detail.     Objective:    Physical examination:  VS: /62   Pulse 82   Temp 97.9 °F (36.6 °C) (Oral)   Resp 18   Ht 5' 3\" (1.6 m)   Wt 300 lb (136.1 kg)   LMP  (LMP Unknown)   SpO2 99%   BMI 53.14 kg/m²   I/O:     Intake/Output Summary (Last 24 hours) at 7/10/2021 2207  Last data filed at 7/10/2021 1815  Gross per 24 hour   Intake 480 ml   Output 2000 ml   Net -1520 ml     General Appearance: alert and oriented to person, place and time, in no acute distress  HEENT: normocephalic and atraumatic, pupils equal, round, and reactive to light, Ssupple and non-tender without mass, no thyromegaly   Cardiovascular: normal rate, regular rhythm, normal S1 and S2, no murmurs, rubs, clicks, or gallops, distal pulses intact, no carotid bruits, no JVD  Pulmonary/Chest: clear to auscultation bilaterally- no wheezes, rales or rhonchi, normal air movement, no respiratory distress  Abdomen: soft, non-tender, non-distended, normal bowel sounds, no masses   Extremities: Randal leg lymphedema, legs wrapped with compression band  Musculoskeletal: normal range of motion, no joint swelling, deformity or tenderness  Neurological: alert, oriented, normal speech, no focal findings or movement disorder noted    Medications:  Scheduled Meds:   vancomycin  1,750 mg Intravenous Q24H    cefdinir  300 mg Oral 2 times per day    ipratropium-albuterol  1 ampule Inhalation Q4H WA    white petrolatum   Topical Daily    insulin lispro  0-18 Units Subcutaneous TID WC    insulin lispro  0-9 Units Subcutaneous Nightly    sodium chloride flush  5-40 mL Intravenous 2 times per day    apixaban  5 mg Oral BID    dilTIAZem  240 mg Oral BID    escitalopram  20 mg Oral Daily    ezetimibe  10 mg Oral Nightly    ferrous sulfate  325 mg Oral Daily with breakfast    gabapentin  600 mg Oral TID    insulin glargine  45 Units Subcutaneous BID    pantoprazole  40 mg Oral QAM AC    metoprolol succinate  100 mg Oral BID    miconazole   Topical BID    spironolactone  25 mg Oral Daily    traZODone  50 mg Oral Nightly       PRN Meds:  hydrOXYzine, glucose, dextrose, glucagon (rDNA), dextrose, oxyCODONE-acetaminophen, sodium chloride flush, sodium chloride, ondansetron **OR** ondansetron, polyethylene glycol, acetaminophen **OR** acetaminophen, white petrolatum    IV:   dextrose      sodium chloride         LABS:  Recent Results (from the past 24 hour(s))   POCT Glucose    Collection Time: 07/10/21  6:36 AM   Result Value Ref Range    Meter Glucose 261 (H) 74 - 99 mg/dL   POCT Glucose    Collection Time: 07/10/21 11:32 AM   Result Value Ref Range    Meter Glucose 325 (H) 74 - 99 mg/dL   POCT Glucose    Collection Time: 07/10/21  3:58 PM   Result Value Ref Range    Meter Glucose 326 (H) 74 - 99 mg/dL   POCT Glucose    Collection Time: 07/10/21  5:55 PM   Result Value Ref Range    Meter Glucose 346 (H) 74 - 99 mg/dL   POCT Glucose    Collection Time: 07/10/21  8:34 PM   Result Value Ref Range    Meter Glucose 355 (H) 74 - 99 mg/dL       RADIOLOGY:  XR TIBIA FIBULA LEFT (2 VIEWS)    Result Date: 7/6/2021  EXAMINATION: 4 XRAY VIEWS OF THE LEFT TIBIA AND FIBULA 7/5/2021 11:00 pm COMPARISON: None. HISTORY: ORDERING SYSTEM PROVIDED HISTORY: osteomyelitis TECHNOLOGIST PROVIDED HISTORY: Reason for exam:->osteomyelitis What reading provider will be dictating this exam?->CRC FINDINGS: Partial visualization of knee prosthesis. Extensive diffuse soft tissue edema. No other radiopaque foreign body. Advanced degenerative changes of the ankle and visualized foot. Prominent plantar and Achilles calcaneal spurs. No dislocation, bony destruction or distinct acute fracture. Extensive soft tissue edema. This could be reactive or due to cellulitis. No plain radiographic evidence of osteomyelitis. MRI would be useful if symptoms persist. Other chronic appearing findings. XR TIBIA FIBULA RIGHT (2 VIEWS)    Result Date: 7/6/2021  EXAMINATION: 4 XRAY VIEWS OF THE RIGHT TIBIA AND FIBULA 7/5/2021 11:01 pm COMPARISON: None. HISTORY: ORDERING SYSTEM PROVIDED HISTORY: osteomyelitis TECHNOLOGIST PROVIDED HISTORY: Reason for exam:->osteomyelitis What reading provider will be dictating this exam?->CRC FINDINGS: Extensive diffuse soft tissue edema. Old-appearing fracture deformities of the distal diaphysis of the tibia and fibula. Advanced degenerative changes of the knee and ankle. No radiopaque foreign body. No bony destruction.      Diffuse soft tissue edema which could be reactive or due to cellulitis. No plain radiographic evidence of osteomyelitis. MRI would be useful if symptoms persist. Other chronic appearing findings. Assessment and plan: Active Problems:    Anemia    Chronic anticoagulation    Uncontrolled diabetes mellitus (HCC)    Essential hypertension    Chronic diastolic heart failure (HCC)    Physical deconditioning    Lymphedema of both lower extremities    Morbid obesity with BMI of 45.0-49.9, adult ([facility])    Ulcers of both lower legs ([facility])    Atrial fibrillation (HCC)    Infection due to extended-spectrum beta-lactamase-producing Escherichia coli    Bilateral cellulitis of lower leg    Ambulatory dysfunction    Chronic respiratory failure with hypercapnia ([facility])    Moderate pulmonary hypertension ([facility])    QT prolongation    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[facility] [address]  Resolved Problems:    * No resolved hospital problems. *    Impression  Bilateral lower extremity cellulitis  -Bilateral leg lymphedema  -History of HFpEF  -Ambulatory dysfunction  -Chronic atrial fibrillation currently rate controlled  -Chronically dependent on 3 L O2  -Leg ulcers  -Poorly controlled type 2 diabetes  -Hypertension    Plan  Cont IV abx as per ID recs   Cont rest of medial management  Will re-evaluate tomorrow        DVT prophylaxis: Subcutaneous heparin  CODE STATUS: Patient is a full code  Discharge plan: Patient can be discharged next 3 to 4 days to home with and without home health.,  Pending clinical improvement, pending work-up and clearance by consulting services. Electronically signed by Terry Hoang MD on 7/10/2021 at 10:07 PM  NOTE: This report was transcribed using voice recognition software. Every effort was made to ensure accuracy; however, inadvertent computerized transcription errors may be present.

## 2021-07-11 NOTE — PROGRESS NOTES
Physical Therapy    Pt on PT caseload. Attempted PT treatment this AM.  Pt received seated in chair. Reports BLE are feeling better this date. When asked to participate with therapy pt declined stating she was too tired. Educated on benefits and importance of participation with therapy but she continued to decline. Will re-attempt at a later time/date.     Jeffy Loera, PT, DPT  [id number]

## 2021-07-11 NOTE — PLAN OF CARE
Problem: Skin Integrity:  Goal: Will show no infection signs and symptoms  Description: Will show no infection signs and symptoms  7/11/2021 0036 by Yanna Santillan RN  Outcome: Met This Shift  7/10/2021 1101 by Ender Coe RN  Outcome: Met This Shift     Problem: Falls - Risk of:  Goal: Will remain free from falls  Description: Will remain free from falls  7/11/2021 0036 by Yanna Santillan RN  Outcome: Met This Shift  7/10/2021 1101 by Ender Coe RN  Outcome: Met This Shift     Problem: Pain:  Goal: Pain level will decrease  Description: Pain level will decrease  7/11/2021 0036 by Yanna Santillan RN  Outcome: Met This Shift  7/10/2021 1101 by Ender Coe RN  Outcome: Met This Shift  Goal: Control of acute pain  Description: Control of acute pain  7/11/2021 0036 by Yanna Santillan RN  Outcome: Met This Shift  7/10/2021 1101 by Ender Coe RN  Outcome: Met This Shift  Goal: Control of chronic pain  Description: Control of chronic pain  7/10/2021 1101 by Ender Coe RN  Outcome: Met This Shift

## 2021-07-11 NOTE — PROGRESS NOTES
Department of Internal Medicine  Infectious Diseases  Progress  Note    C/C :  Chronic lymphedema, leg wound infection     Denies fever or chills  Reports leg pain- improved   Less drainage  Afebrile       Current Facility-Administered Medications   Medication Dose Route Frequency Provider Last Rate Last Admin    vancomycin (VANCOCIN) 1,750 mg in dextrose 5 % 500 mL IVPB  1,750 mg Intravenous Q24H Marquis Philippe MD   Stopped at 07/11/21 0000    cefdinir (OMNICEF) capsule 300 mg  300 mg Oral 2 times per day Farzana Treadwell MD   300 mg at 07/11/21 0940    ipratropium-albuterol (DUONEB) nebulizer solution 1 ampule  1 ampule Inhalation Q4H WA Blaise Bond DO   1 ampule at 07/11/21 1249    white petrolatum ointment   Topical Daily Blaise Bond DO   Given at 07/11/21 0940    hydrOXYzine (VISTARIL) capsule 50 mg  50 mg Oral Q4H PRN Kenzie Melissa DO   50 mg at 07/11/21 0940    glucose (GLUTOSE) 40 % oral gel 15 g  15 g Oral PRN Blaise Bond, DO        dextrose 50 % IV solution  12.5 g Intravenous PRN Blaise Bond, DO        glucagon (rDNA) injection 1 mg  1 mg Intramuscular PRN Blaise Bond, DO        dextrose 5 % solution  100 mL/hr Intravenous PRN Blaise Bond, DO        insulin lispro (HUMALOG) injection vial 0-18 Units  0-18 Units Subcutaneous TID WC Blaise Bond, DO   6 Units at 07/11/21 1223    insulin lispro (HUMALOG) injection vial 0-9 Units  0-9 Units Subcutaneous Nightly Blaise Bond, DO   7 Units at 07/10/21 2041    oxyCODONE-acetaminophen (PERCOCET) 5-325 MG per tablet 1 tablet  1 tablet Oral Q4H PRN Santosh Leija MD   1 tablet at 07/11/21 1237    sodium chloride flush 0.9 % injection 5-40 mL  5-40 mL Intravenous 2 times per day Santosh Leija MD   10 mL at 07/10/21 2035    sodium chloride flush 0.9 % injection 5-40 mL  5-40 mL Intravenous PRN Santosh Leija MD        0.9 % sodium chloride infusion  25 mL Intravenous PRN Santosh Leija MD        ondansetron (ZOFRAN-ODT) disintegrating tablet 4 mg  4 mg Oral Q8H PRN Vijaya Templeton MD        Or    ondansetron (ZOFRAN) injection 4 mg  4 mg Intravenous Q6H PRN Vijaya Templeton MD        polyethylene glycol (GLYCOLAX) packet 17 g  17 g Oral Daily PRN Vijaya Templeton MD        acetaminophen (TYLENOL) tablet 650 mg  650 mg Oral Q6H PRN Vijaya Templeton MD   650 mg at 07/08/21 2121    Or    acetaminophen (TYLENOL) suppository 650 mg  650 mg Rectal Q6H PRN Vijaya Templeton MD        apixaban (ELIQUIS) tablet 5 mg  5 mg Oral BID Vijaya Templeton MD   5 mg at 07/11/21 0939    dilTIAZem (CARDIZEM CD) extended release capsule 240 mg  240 mg Oral BID Vijyaa Templeton MD   240 mg at 07/11/21 0939    escitalopram (LEXAPRO) tablet 20 mg  20 mg Oral Daily Vijaya Templeton MD   20 mg at 07/11/21 0940    ezetimibe (ZETIA) tablet 10 mg  10 mg Oral Nightly Vijaya Templeton MD   10 mg at 07/10/21 2034    ferrous sulfate (IRON 325) tablet 325 mg  325 mg Oral Daily with breakfast Vijaya Templeton MD   325 mg at 07/11/21 0939    gabapentin (NEURONTIN) capsule 600 mg  600 mg Oral TID Vijaya Templeton MD   600 mg at 07/11/21 1223    insulin glargine (LANTUS) injection vial 45 Units  45 Units Subcutaneous BID Vijaya Templeton MD   45 Units at 07/11/21 0939    pantoprazole (PROTONIX) tablet 40 mg  40 mg Oral QAM AC Vijaya Templeton MD   40 mg at 07/11/21 0645    metoprolol succinate (TOPROL XL) extended release tablet 100 mg  100 mg Oral BID Vijaya Templeton MD   100 mg at 07/11/21 0939    miconazole (MICOTIN) 2 % powder   Topical BID Vijaya Templeton MD   Given at 07/11/21 0940    white petrolatum ointment   Topical TID PRN Vijaya Templeton MD   Given at 07/09/21 0837    spironolactone (ALDACTONE) tablet 25 mg  25 mg Oral Daily Vijaya Templeton MD   25 mg at 07/11/21 1005    traZODone (DESYREL) tablet 50 mg  50 mg Oral Nightly Vijaya Templeton MD   50 mg at 07/10/21 2034     Allergies   Allergen Reactions    Statins Other (See Comments)     Muscle pains         REVIEW OF SYSTEMS:    CONSTITUTIONAL:  Denies fever, chill or rigors  HEENT: denies blurring of vision or double vision, denies hearing problem  RESPIRATORY: denies cough, shortness of breath, sputum expectoration, chest pain. CARDIOVASCULAR:  Denies palpitation  GASTROINTESTINAL:  Denies abdomen pain, diarrhea or constipation. GENITOURINARY:  Denies burning urination or frequency of urination  INTEGUMENT: Bilateral leg wounds   HEMATOLOGIC/LYMPHATIC:  Denies lymph node swelling, gum bleeding or easy bruising. MUSCULOSKELETAL: Bilateral leg swelling, redness   NEUROLOGICAL:  Denies light headed, dizziness      PHYSICAL EXAM:      Vitals:     /66   Pulse 70   Temp 97 °F (36.1 °C) (Temporal)   Resp 18   Ht 5' 3\" (1.6 m)   Wt 299 lb 12.8 oz (136 kg)   LMP  (LMP Unknown)   SpO2 97%   BMI 53.11 kg/m²     General Appearance:    Awake, alert , morbidly obese    Head:    Normocephalic, atraumatic   Eyes:    No pallor, no icterus,   Ears:    No obvious deformity or drainage.    Nose:   No nasal drainage   Throat:   Mucosa moist, no oral thrush   Neck:   Supple, no lymphadenopathy   Back:     no CVA tenderness   Lungs:     Clear to auscultation bilaterally, no wheeze    Heart:    Regular rate and rhythm, no murmur   Abdomen:     Soft, non-tender, bowel sounds present    Extremities:  Bilateral chronic lymphedema, wounds, ACE wrap on    Pulses:   Dorsalis pedis palpable    Skin:  less drainage             DATA:      CBC with Differential:      Lab Results   Component Value Date    WBC 7.7 07/09/2021    RBC 3.80 07/09/2021    HGB 9.4 07/09/2021    HCT 31.4 07/09/2021     07/09/2021    MCV 82.6 07/09/2021    MCH 24.7 07/09/2021    MCHC 29.9 07/09/2021    RDW 15.6 07/09/2021    NRBC 0.0 12/04/2018    SEGSPCT 85 01/29/2014    METASPCT 3.5 12/04/2018    LYMPHOPCT 17.6 07/09/2021    MONOPCT 8.2 07/09/2021    MYELOPCT 2.6 12/04/2018    BASOPCT 0.5 07/09/2021    MONOSABS 0.63 07/09/2021    LYMPHSABS 1.35 07/09/2021    EOSABS 0.20 07/09/2021    BASOSABS 0.04 07/09/2021       CMP     Lab Results   Component Value Date     07/09/2021    K 4.4 07/09/2021    K 4.3 07/06/2021    CL 95 07/09/2021    CO2 32 07/09/2021    BUN 33 07/09/2021    CREATININE 1.0 07/09/2021    GFRAA >60 07/09/2021    LABGLOM 55 07/09/2021    GLUCOSE 194 07/09/2021    GLUCOSE 181 12/16/2011    PROT 6.1 07/07/2021    LABALBU 3.5 07/07/2021    LABALBU 4.5 11/02/2011    CALCIUM 9.1 07/09/2021    BILITOT 0.2 07/07/2021    ALKPHOS 94 07/07/2021    AST 8 07/07/2021    ALT 9 07/07/2021         Hepatic Function Panel:    Lab Results   Component Value Date    ALKPHOS 94 07/07/2021    ALT 9 07/07/2021    AST 8 07/07/2021    PROT 6.1 07/07/2021    BILITOT 0.2 07/07/2021    BILIDIR <0.2 12/26/2019    IBILI see below 12/26/2019    LABALBU 3.5 07/07/2021    LABALBU 4.5 11/02/2011       PT/INR:    Lab Results   Component Value Date    PROTIME 19.5 04/29/2021    INR 1.8 04/29/2021       TSH:    Lab Results   Component Value Date    TSH 0.575 01/15/2021       U/A:    Lab Results   Component Value Date    NITRITE negative 07/26/2018    COLORU Yellow 07/06/2021    PHUR 6.0 07/06/2021    WBCUA 1-3 07/06/2021    RBCUA 2-5 07/06/2021    YEAST Present 05/28/2020    BACTERIA RARE 07/06/2021    CLARITYU Clear 07/06/2021    SPECGRAV 1.020 07/06/2021    LEUKOCYTESUR Negative 07/06/2021    UROBILINOGEN 0.2 07/06/2021    BILIRUBINUR Negative 07/06/2021    BILIRUBINUR negative 07/26/2018    BLOODU SMALL 07/06/2021    GLUCOSEU >=1000 07/06/2021    AMORPHOUS FEW 08/14/2020       Vanco  19.9     MICROBIOLOGY:    Wound cx -      Proteus species   Mixed Gram negative rods   Corynebacteria   Abnormal       Organism Staphylococcus aureusAbnormal     WOUND/ABSCESS --    Light growth   Methicillin resistant Staph aureus isolated.  Most Methicillin   resistant Staphylococcus are usually resistant to multiple   antibiotics including other B-Lactams, Aminoglycosides,   Macrolides, Clindamycin and Tetracycline. Contact isolation   is indicated. Narrative:       Vancomycin trough level - 10.6    IMPRESSION:     1. Bilateral leg lymphedema, wound infection, cellulitis   2. Morbid obesity         RECOMMENDATIONS:      1. Stop vancomycin   2. Linezolid 600 mg po q 12 hr   3. Omnicef 300 mg po q 12 hrs    4.  Contact isolation

## 2021-07-11 NOTE — PROGRESS NOTES
Pharmacy Consultation Note  (Antibiotic Dosing and Monitoring)    Initial consult date: 2021   Consulting physician/provider: Franklyn Tinajero and Jose Martin ()  Drug(s): vancomycin   Indication: CSSSI; B/L LE cellulitis  Age/Gender IBW DW  Allergy Information   77 y.o. female; 160 cm, 117.9 kg 52.9 kg 78.9 kg  Statins           Date  WBC BUN/CR Drug/Dose Time   Given Level(s)   (Time) Comments    7.7 27/0.8 X X     7/6 7.8 23/0.7 vanco 2000 mg q12h 1010, 2242     7/7 5.8 21/0.8 vanco 1500 mg q24h 2211 Random level = 19.9 mCg/mL @ 0618    7/8 6.9 27/1 vanco 1500 mg q24h 2235     7/9 7.7 33/1 vanco 1500 mg q24h 2316 Trough = 10.6 mCg/mL @ 2105    7/10 X X vanco 1750 mg q24h 2038     7/11 X X vanco 1750 mg q24h                 Estimated Creatinine Clearance: 74 mL/min (based on SCr of 1 mg/dL). Intake/Output Summary (Last 24 hours) at 2021 0719  Last data filed at 2021 0651  Gross per 24 hour   Intake 480 ml   Output 1400 ml   Net -920 ml   UO = 0.4 mL/kg/hr (1400 mL)    Temp max: Temp (24hrs), Av.9 °F (36.6 °C), Min:97.4 °F (36.3 °C), Max:98.5 °F (36.9 °C)    Assessment:  · Consulted by Dr. Sabrina Flores to dose/monitor vancomycin. · Goal trough level:  15-20 mCg/mL; AUC/MALKA = 400-600 mg/L-hr. · 64 yo/F admitted for B/L LE pain, swelling, and redness. Has B/L open wounds, RLE wound is draining. · PMH: ESBL pyelonephritis/bacteremia in Apr-May (ertapenem). · Empiric ATBs (vanco and meropenem) started on this admission. · ID consulted and wants Pharmacy to continue the consult (d/w Dr. Mikel Gunter today. · : day #2 vanco, day #3 meropenem. Vanc random level = 19.9 this AM; low UO documented. Afebrile, WBC WNL, BC's NGTD; Wound Cx (): mixed GNR, growing Proteus species. · : day #4 vanco, day #5 meropenem. +Staph aureus from Wound Cx (), (S)-pending. Low UO documented, but incomplete data and obese patient.   · 7/10: day #5 vanco, trough from 2105 on  was 10.6 mcg/mL, AUC/MALKA 384  · 7/11: day #6 vanco, UOP stable    Plan:  · Will continue vancomycin 1750 mg q24h: predicted AUC/MALKA = 445,   Tr = 12 mCg/mL. · Will recheck level as needed  · Pharmacist will follow and monitor/adjust dosing as necessary.     Johnson WardD, Fleming County HospitalCP  7/11/2021  7:19 AM

## 2021-07-11 NOTE — PROGRESS NOTES
Hospitalist Progress Note    Chief complaint:  Chief Complaint   Patient presents with    Leg Swelling     pt has bilateral leg pain with swelling and redness noted to lower legs        Admit date:  7/5/2021    Days in hospital:    6    Synopsis :    Patient is  77years old female presents with a chief complaint weakness and swelling. Patient has history of hypertension hyperlipidemia diabetes diastolic congestive heart failure atrial fibrillation on Eliquis chronic respiratory failure on 3 L oxygen chronic edema with lower extremity pain, DVT on Eliquis COPD asthma exactly depression. Patient was recently in the hospital with ESBL bacteremia due to pyelonephritis sepsis encephalopathy and worsening respiratory failure, CT scan at that time showed enlarging right adrenal mass with possible mass in the left kidney. This admission patient coming with lower extremity increasing swelling and redness, patient was admitted for bilateral lower extremity cellulitis. Patient status post IV antibiotic, switching to oral antibiotic today. Monitor 1 more day we will plan for discharge likely tomorrow on 7/12/2021      Subjective:   No acute events overnight, this morning patient seen at bedside no acute distress. Patient is not reporting any chest pain. No obvious difficulty in breathing at this point and no worsening of any dyspnea reported. No abdominal pain or discomfort. No dizziness. No new complaint. All questions answered in detail at bedside. Plan discussed with nursing staff in detail.     Objective:    Physical examination:  VS: /62   Pulse 82   Temp 97.9 °F (36.6 °C) (Oral)   Resp 18   Ht 5' 3\" (1.6 m)   Wt 300 lb (136.1 kg)   LMP  (LMP Unknown)   SpO2 99%   BMI 53.14 kg/m²   I/O:     Intake/Output Summary (Last 24 hours) at 7/11/2021 1852  Last data filed at 7/11/2021 1816  Gross per 24 hour   Intake 1200 ml   Output 1500 ml   Net -300 ml General Appearance: alert and oriented to person, place and time, in no acute distress  HEENT: normocephalic and atraumatic, pupils equal, round, and reactive to light, Ssupple and non-tender without mass, no thyromegaly   Cardiovascular: normal rate, regular rhythm, normal S1 and S2, no murmurs, rubs, clicks, or gallops, distal pulses intact, no carotid bruits, no JVD  Pulmonary/Chest: clear to auscultation bilaterally- no wheezes, rales or rhonchi, normal air movement, no respiratory distress  Abdomen: soft, non-tender, non-distended, normal bowel sounds, no masses   Extremities: Randal leg lymphedema, legs wrapped with compression band  Musculoskeletal: normal range of motion, no joint swelling, deformity or tenderness  Neurological: alert, oriented, normal speech, no focal findings or movement disorder noted    Medications:  Scheduled Meds:   linezolid  600 mg Oral 2 times per day    cefdinir  300 mg Oral 2 times per day    ipratropium-albuterol  1 ampule Inhalation Q4H WA    white petrolatum   Topical Daily    insulin lispro  0-18 Units Subcutaneous TID WC    insulin lispro  0-9 Units Subcutaneous Nightly    sodium chloride flush  5-40 mL Intravenous 2 times per day    apixaban  5 mg Oral BID    dilTIAZem  240 mg Oral BID    ezetimibe  10 mg Oral Nightly    ferrous sulfate  325 mg Oral Daily with breakfast    gabapentin  600 mg Oral TID    insulin glargine  45 Units Subcutaneous BID    pantoprazole  40 mg Oral QAM AC    metoprolol succinate  100 mg Oral BID    miconazole   Topical BID    spironolactone  25 mg Oral Daily    traZODone  50 mg Oral Nightly       PRN Meds:  hydrOXYzine, glucose, dextrose, glucagon (rDNA), dextrose, oxyCODONE-acetaminophen, sodium chloride flush, sodium chloride, ondansetron **OR** ondansetron, polyethylene glycol, acetaminophen **OR** acetaminophen, white petrolatum    IV:   dextrose      sodium chloride         LABS:  Recent Results (from the past 24 hour(s)) POCT Glucose    Collection Time: 07/10/21  8:34 PM   Result Value Ref Range    Meter Glucose 355 (H) 74 - 99 mg/dL   POCT Glucose    Collection Time: 07/11/21  6:46 AM   Result Value Ref Range    Meter Glucose 196 (H) 74 - 99 mg/dL   POCT Glucose    Collection Time: 07/11/21 11:20 AM   Result Value Ref Range    Meter Glucose 226 (H) 74 - 99 mg/dL   POCT Glucose    Collection Time: 07/11/21  4:22 PM   Result Value Ref Range    Meter Glucose 228 (H) 74 - 99 mg/dL       RADIOLOGY:  XR TIBIA FIBULA LEFT (2 VIEWS)    Result Date: 7/6/2021  EXAMINATION: 4 XRAY VIEWS OF THE LEFT TIBIA AND FIBULA 7/5/2021 11:00 pm COMPARISON: None. HISTORY: ORDERING SYSTEM PROVIDED HISTORY: osteomyelitis TECHNOLOGIST PROVIDED HISTORY: Reason for exam:->osteomyelitis What reading provider will be dictating this exam?->CRC FINDINGS: Partial visualization of knee prosthesis. Extensive diffuse soft tissue edema. No other radiopaque foreign body. Advanced degenerative changes of the ankle and visualized foot. Prominent plantar and Achilles calcaneal spurs. No dislocation, bony destruction or distinct acute fracture. Extensive soft tissue edema. This could be reactive or due to cellulitis. No plain radiographic evidence of osteomyelitis. MRI would be useful if symptoms persist. Other chronic appearing findings. XR TIBIA FIBULA RIGHT (2 VIEWS)    Result Date: 7/6/2021  EXAMINATION: 4 XRAY VIEWS OF THE RIGHT TIBIA AND FIBULA 7/5/2021 11:01 pm COMPARISON: None. HISTORY: ORDERING SYSTEM PROVIDED HISTORY: osteomyelitis TECHNOLOGIST PROVIDED HISTORY: Reason for exam:->osteomyelitis What reading provider will be dictating this exam?->CRC FINDINGS: Extensive diffuse soft tissue edema. Old-appearing fracture deformities of the distal diaphysis of the tibia and fibula. Advanced degenerative changes of the knee and ankle. No radiopaque foreign body. No bony destruction.      Diffuse soft tissue edema which could be reactive or due to cellulitis. No plain radiographic evidence of osteomyelitis. MRI would be useful if symptoms persist. Other chronic appearing findings. Assessment and plan: Active Problems:    Anemia    Chronic anticoagulation    Uncontrolled diabetes mellitus (HCC)    Essential hypertension    Chronic diastolic heart failure (HCC)    Physical deconditioning    Lymphedema of both lower extremities    Morbid obesity with BMI of 45.0-49.9, adult ([facility])    Ulcers of both lower legs ([facility])    Atrial fibrillation ([facility])    Infection due to extended-spectrum beta-lactamase-producing Escherichia coli    Bilateral cellulitis of lower leg    Ambulatory dysfunction    Chronic respiratory failure with hypercapnia ([facility])    Moderate pulmonary hypertension ([facility])    QT prolongation    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[facility] [address]  Resolved Problems:    * No resolved hospital problems. *    Impression  Bilateral lower extremity cellulitis  -Bilateral leg lymphedema  -History of HFpEF  -Ambulatory dysfunction  -Chronic atrial fibrillation currently rate controlled  -Chronically dependent on 3 L O2  -Leg ulcers  -Poorly controlled type 2 diabetes  -Hypertension    Plan  Status post IV antibiotic, patient switched to oral antibiotics today  Continue wound care  Cont rest of medial management  Will re-evaluate tomorrow, if stable plan to discharge tomorrow  DVT prophylaxis: Subcutaneous heparin  CODE STATUS: Patient is a full code  Discharge plan: Plan to discharge tomorrow    Electronically signed by Deneen Martinez MD on 7/11/2021 at 6:52 PM  NOTE: This report was transcribed using voice recognition software. Every effort was made to ensure accuracy; however, inadvertent computerized transcription errors may be present.

## 2021-07-12 LAB
METER GLUCOSE: 174 MG/DL (ref 74–99)
METER GLUCOSE: 194 MG/DL (ref 74–99)
METER GLUCOSE: 322 MG/DL (ref 74–99)
METER GLUCOSE: 366 MG/DL (ref 74–99)

## 2021-07-12 PROCEDURE — 6370000000 HC RX 637 (ALT 250 FOR IP): Performed by: FAMILY MEDICINE

## 2021-07-12 PROCEDURE — 2700000000 HC OXYGEN THERAPY PER DAY

## 2021-07-12 PROCEDURE — 97530 THERAPEUTIC ACTIVITIES: CPT

## 2021-07-12 PROCEDURE — 1200000000 HC SEMI PRIVATE

## 2021-07-12 PROCEDURE — 87635 SARS-COV-2 COVID-19 AMP PRB: CPT

## 2021-07-12 PROCEDURE — 6370000000 HC RX 637 (ALT 250 FOR IP): Performed by: INTERNAL MEDICINE

## 2021-07-12 PROCEDURE — 97535 SELF CARE MNGMENT TRAINING: CPT

## 2021-07-12 PROCEDURE — 82962 GLUCOSE BLOOD TEST: CPT

## 2021-07-12 PROCEDURE — 2580000003 HC RX 258: Performed by: FAMILY MEDICINE

## 2021-07-12 PROCEDURE — 94640 AIRWAY INHALATION TREATMENT: CPT

## 2021-07-12 RX ORDER — LINEZOLID 600 MG/1
600 TABLET, FILM COATED ORAL EVERY 12 HOURS SCHEDULED
Qty: 28 TABLET | Refills: 0 | Status: SHIPPED | OUTPATIENT
Start: 2021-07-12 | End: 2021-07-13

## 2021-07-12 RX ORDER — CEFDINIR 300 MG/1
300 CAPSULE ORAL EVERY 12 HOURS SCHEDULED
Qty: 20 CAPSULE | Refills: 0 | Status: SHIPPED | OUTPATIENT
Start: 2021-07-12 | End: 2021-07-13

## 2021-07-12 RX ADMIN — PANTOPRAZOLE SODIUM 40 MG: 40 TABLET, DELAYED RELEASE ORAL at 05:22

## 2021-07-12 RX ADMIN — SODIUM CHLORIDE, PRESERVATIVE FREE 10 ML: 5 INJECTION INTRAVENOUS at 10:31

## 2021-07-12 RX ADMIN — APIXABAN 5 MG: 5 TABLET, FILM COATED ORAL at 09:19

## 2021-07-12 RX ADMIN — INSULIN GLARGINE 45 UNITS: 100 INJECTION, SOLUTION SUBCUTANEOUS at 09:20

## 2021-07-12 RX ADMIN — IPRATROPIUM BROMIDE AND ALBUTEROL SULFATE 1 AMPULE: 2.5; .5 SOLUTION RESPIRATORY (INHALATION) at 20:17

## 2021-07-12 RX ADMIN — GABAPENTIN 600 MG: 300 CAPSULE ORAL at 14:24

## 2021-07-12 RX ADMIN — APIXABAN 5 MG: 5 TABLET, FILM COATED ORAL at 21:44

## 2021-07-12 RX ADMIN — HYDROXYZINE PAMOATE 50 MG: 50 CAPSULE ORAL at 21:45

## 2021-07-12 RX ADMIN — OXYCODONE AND ACETAMINOPHEN 1 TABLET: 5; 325 TABLET ORAL at 18:05

## 2021-07-12 RX ADMIN — CEFDINIR 300 MG: 300 CAPSULE ORAL at 21:44

## 2021-07-12 RX ADMIN — EZETIMIBE 10 MG: 10 TABLET ORAL at 21:44

## 2021-07-12 RX ADMIN — OXYCODONE AND ACETAMINOPHEN 1 TABLET: 5; 325 TABLET ORAL at 00:21

## 2021-07-12 RX ADMIN — PETROLATUM: 420 OINTMENT TOPICAL at 11:05

## 2021-07-12 RX ADMIN — CEFDINIR 300 MG: 300 CAPSULE ORAL at 09:19

## 2021-07-12 RX ADMIN — TRAZODONE HYDROCHLORIDE 50 MG: 50 TABLET ORAL at 21:44

## 2021-07-12 RX ADMIN — INSULIN LISPRO 15 UNITS: 100 INJECTION, SOLUTION INTRAVENOUS; SUBCUTANEOUS at 18:05

## 2021-07-12 RX ADMIN — DILTIAZEM HYDROCHLORIDE 240 MG: 240 CAPSULE, EXTENDED RELEASE ORAL at 21:44

## 2021-07-12 RX ADMIN — PETROLATUM: 420 OINTMENT TOPICAL at 10:31

## 2021-07-12 RX ADMIN — IPRATROPIUM BROMIDE AND ALBUTEROL SULFATE 1 AMPULE: 2.5; .5 SOLUTION RESPIRATORY (INHALATION) at 10:41

## 2021-07-12 RX ADMIN — INSULIN LISPRO 6 UNITS: 100 INJECTION, SOLUTION INTRAVENOUS; SUBCUTANEOUS at 21:55

## 2021-07-12 RX ADMIN — FERROUS SULFATE TAB 325 MG (65 MG ELEMENTAL FE) 325 MG: 325 (65 FE) TAB at 10:31

## 2021-07-12 RX ADMIN — METOPROLOL SUCCINATE 100 MG: 100 TABLET, EXTENDED RELEASE ORAL at 21:44

## 2021-07-12 RX ADMIN — OXYCODONE AND ACETAMINOPHEN 1 TABLET: 5; 325 TABLET ORAL at 10:33

## 2021-07-12 RX ADMIN — OXYCODONE AND ACETAMINOPHEN 1 TABLET: 5; 325 TABLET ORAL at 05:22

## 2021-07-12 RX ADMIN — INSULIN LISPRO 3 UNITS: 100 INJECTION, SOLUTION INTRAVENOUS; SUBCUTANEOUS at 12:46

## 2021-07-12 RX ADMIN — GABAPENTIN 600 MG: 300 CAPSULE ORAL at 09:19

## 2021-07-12 RX ADMIN — SPIRONOLACTONE 25 MG: 25 TABLET ORAL at 09:19

## 2021-07-12 RX ADMIN — LINEZOLID 600 MG: 600 TABLET ORAL at 21:48

## 2021-07-12 RX ADMIN — INSULIN LISPRO 3 UNITS: 100 INJECTION, SOLUTION INTRAVENOUS; SUBCUTANEOUS at 09:19

## 2021-07-12 RX ADMIN — MICONAZOLE NITRATE: 20.6 POWDER TOPICAL at 21:49

## 2021-07-12 RX ADMIN — LINEZOLID 600 MG: 600 TABLET ORAL at 09:19

## 2021-07-12 RX ADMIN — IPRATROPIUM BROMIDE AND ALBUTEROL SULFATE 1 AMPULE: 2.5; .5 SOLUTION RESPIRATORY (INHALATION) at 17:15

## 2021-07-12 RX ADMIN — METOPROLOL SUCCINATE 100 MG: 100 TABLET, EXTENDED RELEASE ORAL at 09:19

## 2021-07-12 RX ADMIN — DILTIAZEM HYDROCHLORIDE 240 MG: 240 CAPSULE, EXTENDED RELEASE ORAL at 09:19

## 2021-07-12 RX ADMIN — GABAPENTIN 600 MG: 300 CAPSULE ORAL at 21:44

## 2021-07-12 RX ADMIN — MICONAZOLE NITRATE: 20.6 POWDER TOPICAL at 10:31

## 2021-07-12 RX ADMIN — INSULIN GLARGINE 45 UNITS: 100 INJECTION, SOLUTION SUBCUTANEOUS at 21:54

## 2021-07-12 RX ADMIN — IPRATROPIUM BROMIDE AND ALBUTEROL SULFATE 1 AMPULE: 2.5; .5 SOLUTION RESPIRATORY (INHALATION) at 03:38

## 2021-07-12 ASSESSMENT — PAIN SCALES - GENERAL
PAINLEVEL_OUTOF10: 8
PAINLEVEL_OUTOF10: 7
PAINLEVEL_OUTOF10: 10
PAINLEVEL_OUTOF10: 8
PAINLEVEL_OUTOF10: 9

## 2021-07-12 ASSESSMENT — PAIN DESCRIPTION - PROGRESSION: CLINICAL_PROGRESSION: NOT CHANGED

## 2021-07-12 NOTE — PROGRESS NOTES
Hospitalist Progress Note    Chief complaint:  Chief Complaint   Patient presents with    Leg Swelling     pt has bilateral leg pain with swelling and redness noted to lower legs        Admit date:  7/5/2021    Days in hospital:    7    Synopsis :    Patient is  77years old female presents with a chief complaint weakness and swelling. Patient has history of hypertension hyperlipidemia diabetes diastolic congestive heart failure atrial fibrillation on Eliquis chronic respiratory failure on 3 L oxygen chronic edema with lower extremity pain, DVT on Eliquis COPD asthma exactly depression. Patient was recently in the hospital with ESBL bacteremia due to pyelonephritis sepsis encephalopathy and worsening respiratory failure, CT scan at that time showed enlarging right adrenal mass with possible mass in the left kidney. This admission patient coming with lower extremity increasing swelling and redness, patient was admitted for bilateral lower extremity cellulitis. Patient status post IV antibiotic, switching to oral antibiotic today.   Monitor 1 more day we will plan for discharge likely tomorrow on 7/12/2021      Subjective:   She has no new issues this am      Objective:    Physical examination:  VS: /62   Pulse 82   Temp 97.9 °F (36.6 °C) (Oral)   Resp 18   Ht 5' 3\" (1.6 m)   Wt 300 lb (136.1 kg)   LMP  (LMP Unknown)   SpO2 99%   BMI 53.14 kg/m²   I/O:     Intake/Output Summary (Last 24 hours) at 7/12/2021 1005  Last data filed at 7/12/2021 0526  Gross per 24 hour   Intake 960 ml   Output 1500 ml   Net -540 ml     General Appearance: alert and oriented to person, place and time, in no acute distress  HEENT: normocephalic and atraumatic, pupils equal, round, and reactive to light, Ssupple and non-tender without mass, no thyromegaly   Cardiovascular: normal rate, regular rhythm, normal S1 and S2, no murmurs, rubs, clicks, or gallops, distal pulses intact, no carotid bruits, no JVD  Pulmonary/Chest: clear to auscultation bilaterally- no wheezes, rales or rhonchi, normal air movement, no respiratory distress  Abdomen: soft, non-tender, non-distended, normal bowel sounds, no masses   Extremities: Randal leg lymphedema, legs wrapped with compression band  Musculoskeletal: normal range of motion, no joint swelling, deformity or tenderness  Neurological: alert, oriented, normal speech, no focal findings or movement disorder noted    Medications:  Scheduled Meds:   linezolid  600 mg Oral 2 times per day    cefdinir  300 mg Oral 2 times per day    ipratropium-albuterol  1 ampule Inhalation Q4H WA    white petrolatum   Topical Daily    insulin lispro  0-18 Units Subcutaneous TID WC    insulin lispro  0-9 Units Subcutaneous Nightly    sodium chloride flush  5-40 mL Intravenous 2 times per day    apixaban  5 mg Oral BID    dilTIAZem  240 mg Oral BID    ezetimibe  10 mg Oral Nightly    ferrous sulfate  325 mg Oral Daily with breakfast    gabapentin  600 mg Oral TID    insulin glargine  45 Units Subcutaneous BID    pantoprazole  40 mg Oral QAM AC    metoprolol succinate  100 mg Oral BID    miconazole   Topical BID    spironolactone  25 mg Oral Daily    traZODone  50 mg Oral Nightly       PRN Meds:  hydrOXYzine, glucose, dextrose, glucagon (rDNA), dextrose, oxyCODONE-acetaminophen, sodium chloride flush, sodium chloride, ondansetron **OR** ondansetron, polyethylene glycol, acetaminophen **OR** acetaminophen, white petrolatum    IV:   dextrose      sodium chloride         LABS:  Recent Results (from the past 24 hour(s))   POCT Glucose    Collection Time: 07/11/21 11:20 AM   Result Value Ref Range    Meter Glucose 226 (H) 74 - 99 mg/dL   POCT Glucose    Collection Time: 07/11/21  4:22 PM   Result Value Ref Range    Meter Glucose 228 (H) 74 - 99 mg/dL   POCT Glucose    Collection Time: 07/11/21  8:27 PM   Result Value Ref Range    Meter Glucose 260 (H) 74 - 99 mg/dL   POCT Glucose    Collection Time: 07/12/21  5:33 AM   Result Value Ref Range    Meter Glucose 174 (H) 74 - 99 mg/dL       RADIOLOGY:  XR TIBIA FIBULA LEFT (2 VIEWS)    Result Date: 7/6/2021  EXAMINATION: 4 XRAY VIEWS OF THE LEFT TIBIA AND FIBULA 7/5/2021 11:00 pm COMPARISON: None. HISTORY: ORDERING SYSTEM PROVIDED HISTORY: osteomyelitis TECHNOLOGIST PROVIDED HISTORY: Reason for exam:->osteomyelitis What reading provider will be dictating this exam?->CRC FINDINGS: Partial visualization of knee prosthesis. Extensive diffuse soft tissue edema. No other radiopaque foreign body. Advanced degenerative changes of the ankle and visualized foot. Prominent plantar and Achilles calcaneal spurs. No dislocation, bony destruction or distinct acute fracture. Extensive soft tissue edema. This could be reactive or due to cellulitis. No plain radiographic evidence of osteomyelitis. MRI would be useful if symptoms persist. Other chronic appearing findings. XR TIBIA FIBULA RIGHT (2 VIEWS)    Result Date: 7/6/2021  EXAMINATION: 4 XRAY VIEWS OF THE RIGHT TIBIA AND FIBULA 7/5/2021 11:01 pm COMPARISON: None. HISTORY: ORDERING SYSTEM PROVIDED HISTORY: osteomyelitis TECHNOLOGIST PROVIDED HISTORY: Reason for exam:->osteomyelitis What reading provider will be dictating this exam?->CRC FINDINGS: Extensive diffuse soft tissue edema. Old-appearing fracture deformities of the distal diaphysis of the tibia and fibula. Advanced degenerative changes of the knee and ankle. No radiopaque foreign body. No bony destruction. Diffuse soft tissue edema which could be reactive or due to cellulitis. No plain radiographic evidence of osteomyelitis. MRI would be useful if symptoms persist. Other chronic appearing findings. Assessment and plan:   Active Problems:    Anemia    Chronic anticoagulation    Uncontrolled diabetes mellitus (HCC)    Essential hypertension    Chronic diastolic heart failure ([address]    Physical deconditioning    Lymphedema of both lower extremities    Morbid obesity with BMI of 45.0-49.9, adult (HCC)    Ulcers of both lower legs (HCC)    Atrial fibrillation (HCC)    Infection due to extended-spectrum beta-lactamase-producing Escherichia coli    Bilateral cellulitis of lower leg    Ambulatory dysfunction    Chronic respiratory failure with hypercapnia (HCC)    Moderate pulmonary hypertension (HCC)    QT prolongation    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[facility] [address]  Resolved Problems:    * No resolved hospital problems. *    Impression  Bilateral lower extremity cellulitis  -Bilateral leg lymphedema  -History of HFpEF  -Ambulatory dysfunction  -Chronic atrial fibrillation currently rate controlled  -Chronically dependent on 3 L O2  -Leg ulcers  -Poorly controlled type 2 diabetes  -Hypertension    Plan  Patient on oral antibiotics  Dc home- discussed with patient and she is agreeable  She has visiting physicians as pcp    DVT prophylaxis: Subcutaneous heparin  CODE STATUS: Patient is a full code  Discharge plan: dc home    Electronically signed by Morena Garcia MD on 7/12/2021 at 10:05 AM  NOTE: This report was transcribed using voice recognition software. Every effort was made to ensure accuracy; however, inadvertent computerized transcription errors may be present.

## 2021-07-12 NOTE — PROGRESS NOTES
Department of Internal Medicine  Infectious Diseases  Progress  Note    C/C :  Chronic lymphedema, leg wound infection     Denies fever or chills  Reports leg pain- improved   Less drainage  Afebrile       Current Facility-Administered Medications   Medication Dose Route Frequency Provider Last Rate Last Admin    linezolid (ZYVOX) tablet 600 mg  600 mg Oral 2 times per day Bailey Montgomery MD   600 mg at 07/12/21 0919    cefdinir (OMNICEF) capsule 300 mg  300 mg Oral 2 times per day Bailey Montgomery MD   300 mg at 07/12/21 0919    ipratropium-albuterol (DUONEB) nebulizer solution 1 ampule  1 ampule Inhalation Q4H WA Aminah Sabal, DO   1 ampule at 07/12/21 1041    white petrolatum ointment   Topical Daily Aminah Sabal, DO   Given at 07/11/21 0940    hydrOXYzine (VISTARIL) capsule 50 mg  50 mg Oral Q4H PRN Guyann Repress, DO   50 mg at 07/11/21 0940    glucose (GLUTOSE) 40 % oral gel 15 g  15 g Oral PRN Aminah Sabal, DO        dextrose 50 % IV solution  12.5 g Intravenous PRN Aminah Sabal, DO        glucagon (rDNA) injection 1 mg  1 mg Intramuscular PRN Aminah Sabal, DO        dextrose 5 % solution  100 mL/hr Intravenous PRN Aminah Sabal, DO        insulin lispro (HUMALOG) injection vial 0-18 Units  0-18 Units Subcutaneous TID WC Aminah Sabal, DO   3 Units at 07/12/21 0919    insulin lispro (HUMALOG) injection vial 0-9 Units  0-9 Units Subcutaneous Nightly Aminah Sabal, DO   5 Units at 07/11/21 2133    oxyCODONE-acetaminophen (PERCOCET) 5-325 MG per tablet 1 tablet  1 tablet Oral Q4H PRN Magnolia Marcelo MD   1 tablet at 07/12/21 1033    sodium chloride flush 0.9 % injection 5-40 mL  5-40 mL Intravenous 2 times per day Magnolia Marcelo MD   10 mL at 07/12/21 1031    sodium chloride flush 0.9 % injection 5-40 mL  5-40 mL Intravenous PRN Magnolia Marcelo MD        0.9 % sodium chloride infusion  25 mL Intravenous PRN Magnolia Marcelo MD        ondansetron (ZOFRAN-ODT) disintegrating tablet 4 mg 4 mg Oral Q8H PRN Vijaya Templeton MD        Or    ondansetron (ZOFRAN) injection 4 mg  4 mg Intravenous Q6H PRN Vijaya Templeton MD        polyethylene glycol (GLYCOLAX) packet 17 g  17 g Oral Daily PRN Vijaya Templeton MD        acetaminophen (TYLENOL) tablet 650 mg  650 mg Oral Q6H PRN Vijaya Templeton MD   650 mg at 07/08/21 6371    Or    acetaminophen (TYLENOL) suppository 650 mg  650 mg Rectal Q6H PRN Vijaya Templeton MD        apixaban (ELIQUIS) tablet 5 mg  5 mg Oral BID Vijaya Templeton MD   5 mg at 07/12/21 0919    dilTIAZem (CARDIZEM CD) extended release capsule 240 mg  240 mg Oral BID Vijaya Templeton MD   240 mg at 07/12/21 0919    ezetimibe (ZETIA) tablet 10 mg  10 mg Oral Nightly Vijaya Templeton MD   10 mg at 07/11/21 2133    ferrous sulfate (IRON 325) tablet 325 mg  325 mg Oral Daily with breakfast Vijaya Templeton MD   325 mg at 07/12/21 1031    gabapentin (NEURONTIN) capsule 600 mg  600 mg Oral TID Vijaya Templeton MD   600 mg at 07/12/21 0919    insulin glargine (LANTUS) injection vial 45 Units  45 Units Subcutaneous BID Vijaya Templeton MD   45 Units at 07/12/21 0920    pantoprazole (PROTONIX) tablet 40 mg  40 mg Oral QAM AC Vijaya Templeton MD   40 mg at 07/12/21 0522    metoprolol succinate (TOPROL XL) extended release tablet 100 mg  100 mg Oral BID Vijaya Templeton MD   100 mg at 07/12/21 0919    miconazole (MICOTIN) 2 % powder   Topical BID Vijaya Templeton MD   Given at 07/12/21 1031    white petrolatum ointment   Topical TID PRN Vijaya Templeton MD   Given at 07/12/21 1105    spironolactone (ALDACTONE) tablet 25 mg  25 mg Oral Daily Vijaya Templeton MD   25 mg at 07/12/21 0919    traZODone (DESYREL) tablet 50 mg  50 mg Oral Nightly Vijaya Templeton MD   50 mg at 07/11/21 2133     Allergies   Allergen Reactions    Statins Other (See Comments)     Muscle pains         REVIEW OF SYSTEMS:    CONSTITUTIONAL:  Denies fever, chill or rigors  HEENT: denies blurring of vision or double vision, denies hearing problem  RESPIRATORY: denies cough, shortness of breath, sputum expectoration, chest pain. CARDIOVASCULAR:  Denies palpitation  GASTROINTESTINAL:  Denies abdomen pain, diarrhea or constipation. GENITOURINARY:  Denies burning urination or frequency of urination  INTEGUMENT: Bilateral leg wounds   HEMATOLOGIC/LYMPHATIC:  Denies lymph node swelling, gum bleeding or easy bruising. MUSCULOSKELETAL: Bilateral leg swelling, redness   NEUROLOGICAL:  Denies light headed, dizziness      PHYSICAL EXAM:      Vitals:     BP (!) 156/77   Pulse 77   Temp 97.8 °F (36.6 °C) (Temporal)   Resp 28   Ht 5' 3\" (1.6 m)   Wt 299 lb 12.8 oz (136 kg)   LMP  (LMP Unknown)   SpO2 95%   BMI 53.11 kg/m²     General Appearance:    Awake, alert , morbidly obese    Head:    Normocephalic, atraumatic   Eyes:    No pallor, no icterus,   Ears:    No obvious deformity or drainage.    Nose:   No nasal drainage   Throat:   Mucosa moist, no oral thrush   Neck:   Supple, no lymphadenopathy   Back:     no CVA tenderness   Lungs:     Clear to auscultation bilaterally, no wheeze    Heart:    Regular rate and rhythm, no murmur   Abdomen:     Soft, non-tender, bowel sounds present    Extremities:  Bilateral chronic lymphedema, wounds, ACE wrap on    Pulses:   Dorsalis pedis palpable    Skin:  less drainage             DATA:      CBC with Differential:      Lab Results   Component Value Date    WBC 7.7 07/09/2021    RBC 3.80 07/09/2021    HGB 9.4 07/09/2021    HCT 31.4 07/09/2021     07/09/2021    MCV 82.6 07/09/2021    MCH 24.7 07/09/2021    MCHC 29.9 07/09/2021    RDW 15.6 07/09/2021    NRBC 0.0 12/04/2018    SEGSPCT 85 01/29/2014    METASPCT 3.5 12/04/2018    LYMPHOPCT 17.6 07/09/2021    MONOPCT 8.2 07/09/2021    MYELOPCT 2.6 12/04/2018    BASOPCT 0.5 07/09/2021    MONOSABS 0.63 07/09/2021    LYMPHSABS 1.35 07/09/2021    EOSABS 0.20 07/09/2021    BASOSABS 0.04 07/09/2021       [facility]     Lab Results   Component Value Date     07/09/2021    K 4.4 07/09/2021    K 4.3 07/06/2021    CL 95 07/09/2021    CO2 32 07/09/2021    BUN 33 07/09/2021    CREATININE 1.0 07/09/2021    GFRAA >60 07/09/2021    LABGLOM 55 07/09/2021    GLUCOSE 194 07/09/2021    GLUCOSE 181 12/16/2011    PROT 6.1 07/07/2021    LABALBU 3.5 07/07/2021    LABALBU 4.5 11/02/2011    CALCIUM 9.1 07/09/2021    BILITOT 0.2 07/07/2021    ALKPHOS 94 07/07/2021    AST 8 07/07/2021    ALT 9 07/07/2021         Hepatic Function Panel:    Lab Results   Component Value Date    ALKPHOS 94 07/07/2021    ALT 9 07/07/2021    AST 8 07/07/2021    PROT 6.1 07/07/2021    BILITOT 0.2 07/07/2021    BILIDIR <0.2 12/26/2019    IBILI see below 12/26/2019    LABALBU 3.5 07/07/2021    LABALBU 4.5 11/02/2011       PT/INR:    Lab Results   Component Value Date    PROTIME 19.5 04/29/2021    INR 1.8 04/29/2021       TSH:    Lab Results   Component Value Date    TSH 0.575 01/15/2021       U/A:    Lab Results   Component Value Date    NITRITE negative 07/26/2018    COLORU Yellow 07/06/2021    PHUR 6.0 07/06/2021    WBCUA 1-3 07/06/2021    RBCUA 2-5 07/06/2021    YEAST Present 05/28/2020    BACTERIA RARE 07/06/2021    CLARITYU Clear 07/06/2021    SPECGRAV 1.020 07/06/2021    LEUKOCYTESUR Negative 07/06/2021    UROBILINOGEN 0.2 07/06/2021    BILIRUBINUR Negative 07/06/2021    BILIRUBINUR negative 07/26/2018    BLOODU SMALL 07/06/2021    GLUCOSEU >=1000 07/06/2021    AMORPHOUS FEW 08/14/2020       Vanco  19.9     MICROBIOLOGY:    Wound cx -      Proteus species   Mixed Gram negative rods   Corynebacteria   Abnormal       Organism Staphylococcus aureusAbnormal     WOUND/ABSCESS --    Light growth   Methicillin resistant Staph aureus isolated. Most Methicillin   resistant Staphylococcus are usually resistant to multiple   antibiotics including other B-Lactams, Aminoglycosides,   Macrolides, Clindamycin and Tetracycline. Contact isolation   is indicated.     Narrative:

## 2021-07-12 NOTE — PROGRESS NOTES
Occupational Therapy  OT BEDSIDE TREATMENT NOTE   [facility] [address]  [address] [place] [address]       GYFK:[id number]  Patient Name: Adam Hood  MRN: [id number]  : 1955  Room: [id number]     Per OT Eval:    Evaluating OT: Librado Ndiaye, [address] OTR/L; [id number]       Referring Provider: Tahir Oswald DO    Specific Provider Orders/Date: OT Eval and Treat 21       Diagnosis: BLE Cellulitis    Surgery: none    Pertinent Medical History: DM, HTN, CHF, physical deconditioning, lymphedema of BLE, Morbid obesity, ulcers of BLE, Atrial Fibrillation, COPD with asthma, Osteoarthritis, HLD, Anxiety and Depression, Chronic pain, YEIMI, Hepatitis C, GI Bleed, R LE fracture with surgical repair, L knee replacement     Recommended Adaptive Equipment: TBD       Precautions:  Fall Risk, BLE wound infection contact precautions, O2 3L      Assessment of current deficits    [x] Functional mobility            [x]ADLs           [x] Strength                  [x]Cognition    [x] Functional transfers          [x] IADLs         [x] Safety Awareness   [x]Endurance    [x] Fine Coordination                         [x] Balance      [] Vision/perception   []Sensation      []Gross Motor Coordination             [] ROM           [] Delirium                   [] Motor Control      OT PLAN OF CARE   OT POC based on physician orders, patient diagnosis and results of clinical assessment     Frequency/Duration: 2-4 days/wk for 2 weeks PRN   Specific OT Treatment Interventions to include:   * Instruction/training on adapted ADL techniques and AE recommendations to increase functional independence within precautions   * Training on energy conservation strategies, correct breathing pattern and techniques to improve independence/tolerance for self-care routine  * Functional transfer/mobility training/DME recommendations for increased independence, safety, and fall prevention  * Patient/Family education to increase follow through with safety techniques and functional independence  * Recommendation of environmental modifications for increased safety with functional transfers/mobility and ADLs  * Therapeutic exercise to improve motor endurance, ROM, and functional strength for ADLs/functional transfers  * Therapeutic activities to facilitate/challenge dynamic balance, stand tolerance for increased safety and independence with ADLs     Home Living: Pt lives with  in 2 story home with 1st floor set up, with ramp to enter.  Pt using BSC and sponge bathing  Bathroom setup: walk in shower  Equipment owned: ww, BSC, Rollator, Home O2 2L, Lift Chair, w/c     Prior Level of Function: assist from  with ADLs including dressing/bathing/toileting  and 2 adult grandchildren assist with IADLs; ambulated with rollator in home     Pain Level: did not report any pain  Cognition: A&O: 4/4; Follows multi step directions, pleasant & cooperative              Memory:  good              Sequencing:  fair              Problem solving:  fair              Judgement/safety:  fair                Functional Assessment:  AM-PAC Daily Activity Raw Score: 15/24    Initial Eval Status  Date: 7/9/21 Treatment Status  Date:  7/12/21 STGs = LTGs  Time frame: 10-14 days   Feeding Independent        Grooming Moderate Assist   Decreased endurance and BUE ROM for task  Min A  To reach back of her hair due to decreased R UE AROM, able to wash off her face & hands  7/14 Update Goal  SBA   UB Dressing Moderate Assist   Decreased BUE ROM  Min A  Doff/sidney gown seated at EOB 7/14 Update Goal  SBA   LB Dressing Dependent  Decreased BLE ROM for donning/doffing socks  Dep  Simulated task  Will educate on AE to ease dressing, unable to use sock aide at this time due to dressing on B LE Maximal Assist    Bathing Dependent  BLE bathing and UB dressing d/t decreased AROM  Max A  Simulated task  Moderate Assist    Toileting

## 2021-07-12 NOTE — PROGRESS NOTES
Pharmacy Consultation Note  (Antibiotic Dosing and Monitoring)    Initial consult date: 2021   Consulting physician/provider: Franklyn Tinajero and Jose Martin ()  Drug(s): vancomycin   Indication: CSSSI; B/L LE cellulitis  Age/Gender IBW DW  Allergy Information   77 y.o. female; 160 cm, 117.9 kg 52.9 kg 78.9 kg  Statins           Date  WBC BUN/CR Drug/Dose Time   Given Level(s)   (Time) Comments    7.7 27/0.8 X X     7/6 7.8 23/0.7 vanco 2000 mg q12h 1010, 2242     7/7 5.8 21/0.8 vanco 1500 mg q24h 2211 Random level = 19.9 mCg/mL @ 0618    7/8 6.9 27/1 vanco 1500 mg q24h 2235     7/9 7.7 33/1 vanco 1500 mg q24h 2316 Trough = 10.6 mCg/mL @ 2105    7/10 X X vanco 1750 mg q24h 2038     7/11 X X vanco 1750 mg q24h;  stopped @ 1318 X     7/12 X X X X       Temp max: Temp (24hrs), Av.6 °F (36.4 °C), Min:97.3 °F (36.3 °C), Max:97.8 °F (36.6 °C)    Assessment:  · Consulted by Dr. Christa Carlos to dose/monitor vancomycin. · Goal trough level:  15-20 mCg/mL; AUC/MALKA = 400-600 mg/L-hr. · 66 yo/F admitted for B/L LE pain, swelling, and redness. Has B/L open wounds, RLE wound is draining. · PMH: ESBL pyelonephritis/bacteremia in Apr-May (ertapenem). · Empiric ATBs (vanco and meropenem) started on this admission. · ID consulted and wants Pharmacy to continue the consult (d/w Dr. Rupali Mclean today. · : day #2 vanco, day #3 meropenem. Vanc random level = 19.9 this AM; low UO documented. Afebrile, WBC WNL, BC's NGTD; Wound Cx (): mixed GNR, growing Proteus species. · : day #4 vanco, day #5 meropenem. +Staph aureus from Wound Cx (), (S)-pending. Low UO documented, but incomplete data and obese patient. · 7/10: day #5 vanco, trough from 2105 on  was 10.6 mcg/mL, AUC/MALKA 384  · : day #6 vanco, UOP stable. · : IV ATBs (vanco and meropenem) were changed to PO cefdinir and linezolid on  by Dr. Rupali Mclean. Plan:  · Clinical Pharmacy sign off.     Karis Mott PharmD  2021  12:02

## 2021-07-12 NOTE — PROGRESS NOTES
Patient has been verbally abusive to staff due to staff not providing her more snacks. Patient did have ice cream pudding and crackers already tonight. Multiple nurses have educated her on being a diabetic, wound healing, controlling her blood sugars and her diet order being carb control. We informed her that we do not have any sugar free snacks at this time.

## 2021-07-12 NOTE — PROGRESS NOTES
Physical Therapy  Physical Therapy     Name: Mindy Mario  : 1955  MRN: [id number]      Date of Service: 2021    Jagdeep Sales, PT, DPT  [id number]      Room #:  4648/4252-P  Diagnosis:  Bilateral cellulitis of lower leg [L03.116, U18.752]  PMHx/PSHx:  Afib, asthma, inappropriate sinus tachycardia, HLD, HTN, disc disorder, blood transfusion, arthritis, anal fissure, LVH, occipital neuralgia, fall, lymphadenitis, GERD, CHF, COPD, anxiety and depression, heart failure, lateral sphincterotomy for chronic anal fissure, R LE fracture with surgical repair, L knee replacement  Procedure/Surgery:  None this admission  Precautions:  BLE wounds, falls, O2  Equipment Needs:  TBD    SUBJECTIVE:    Pt lives with her  and 2 adult grandsons in a 2 story house with ramp to enter. Pt has permanent first floor set up. Pt ambulated with rollator prior to admission. She sleeps in a lift chair at baseline. OBJECTIVE:   Initial Evaluation  Date: 21 Treatment 21 Short Term/ Long Term   Goals   AM-PAC 6 Clicks     Was pt agreeable to Eval/treatment? yes yes    Does pt have pain? B LEs, not rated B LEs    Bed Mobility  Rolling: NT  Supine to sit: NT  Sit to supine: NT  Scooting: NT N/T pt sitting EOB Rolling: Abbie  Supine to sit: Abbie  Sit to supine: Abbie  Scooting: Abbie   Transfers Sit to stand: Max A x2  Stand to sit: Max Ax  Stand pivot: NT, pt declined Sit to stand ModA   Stand to sit ModA stand pivot modA Sit to stand: Min A  Stand to sit: Min A  Stand pivot: Min A with AAD   Ambulation    NT, pt declined due to B LE pain 3 feet x1 with bariatric ww ModA >25 feet with AAD with Min A   Stair negotiation: ascended and descended  NT N/T TBD   ROM BUE:  Defer to OT  BLE:  L LE WNL, R LE limited due to pain     Strength BUE:  Defer to OT  L LE 3+/5  R LE limited by pain     Balance Sitting EOB:  NT  Dynamic Standing:   Max A with bariatric FWW Sitting CHERELLE Ind  Standing with ww ModA Sitting EOB: SBA  Dynamic Standing:  Min A with AAD     Pt is A & O x 4  Sensation:  Pt denies numbness and tingling to extremities  Edema: edema of B LEs with ace wraps and bandages    Patient education  Pt educated on role of therapy, importance of continued mobility during admission, safety with transfers    Patient response to education:   Pt verbalized understanding Pt demonstrated skill Pt requires further education in this area   yes yes yes     ASSESSMENT:    Conditions Requiring Skilled Therapeutic Intervention:    [x]Decreased strength     []Decreased ROM  [x]Decreased functional mobility  [x]Decreased balance   [x]Decreased endurance   [x]Decreased posture  [x]Decreased sensation  []Decreased coordination   []Decreased vision  [x]Decreased safety awareness   [x]Increased pain       Comments: Pt cleared by nurse this a.m,. Pt found sitting EOB and agreed to tx. Pt on 3 liters O2. Pt's O2 at rest 96% on 3 liters. Pt required ModA and cues for hand placement during transfers. Pt only able to amb short distance. Cues to keep inside ww. Pt agreed to sit up in chair. Pt given call light. Treatment:  Patient practiced and was instructed in the following treatment:     Transfer training: cues for hand placement, manual assist for lift/lower, max cues for foot placement    Prognosis is fair for reaching above PT goals. Patient and or family understand(s) diagnosis, prognosis, and plan of care.   yes    PHYSICAL THERAPY PLAN OF CARE:    PT POC is established based on physician order and patient diagnosis     Referring provider/PT Order:    07/07/21 1715  PT eval and treat Start: 07/07/21 1715, End: 07/07/21 1715, ONE TIME, Standing Count: 1 Occurrences, R      Blaise Bond DO     Diagnosis:  Bilateral cellulitis of lower leg [L03.116, L03.115]  Specific instructions for next treatment:  Progress standing tolerance, initiate ambulation as able    Current Treatment Recommendations:     [x] Strengthening to improve independence with functional mobility   [] ROM to improve independence with functional mobility   [x] Balance Training to improve static/dynamic balance and to reduce fall risk  [x] Endurance Training to improve activity tolerance during functional mobility   [x] Transfer Training to improve safety and independence with all functional transfers   [x] Gait Training to improve gait mechanics, endurance and asses need for appropriate assistive device  [] Stair Training in preparation for safe discharge home and/or into the community   [x] Positioning to prevent skin breakdown and contractures  [x] Safety and Education Training   [x] Patient/Caregiver Education   [] HEP  [] Other     PT long term treatment goals are located in above grid    Frequency of treatments: 2-5x/week x 1-2 weeks.     Time in  8:40  Time out  8:55    Total Treatment Time  15 minutes         CPT codes:  [] Low Complexity PT evaluation 65334  [] Moderate Complexity PT evaluation 24065  [] High Complexity PT evaluation 75613  [] PT Re-evaluation 99925  [] Gait training 44738 0 minutes  [] Manual therapy 76135 0 minutes  [x] Therapeutic activities 80157 15 minutes  [] Therapeutic exercises 71047 0 minutes  [] Neuromuscular reeducation 72566 0 minutes   Fabi Caleor [id number]

## 2021-07-12 NOTE — CARE COORDINATION
[facility] able to accept, therapy evals completed. precert is pending. Will need rapid covid orders and test completed prior to discharge. Ambulance form, hens and yellow folder completed; located in soft chart.      [address]

## 2021-07-13 VITALS
WEIGHT: 293 LBS | SYSTOLIC BLOOD PRESSURE: 125 MMHG | BODY MASS INDEX: 51.91 KG/M2 | OXYGEN SATURATION: 96 % | DIASTOLIC BLOOD PRESSURE: 52 MMHG | HEIGHT: 63 IN | HEART RATE: 71 BPM | TEMPERATURE: 97.3 F | RESPIRATION RATE: 20 BRPM

## 2021-07-13 LAB
METER GLUCOSE: 118 MG/DL (ref 74–99)
METER GLUCOSE: 201 MG/DL (ref 74–99)
METER GLUCOSE: 241 MG/DL (ref 74–99)
SARS-COV-2, NAAT: NOT DETECTED

## 2021-07-13 PROCEDURE — 6370000000 HC RX 637 (ALT 250 FOR IP): Performed by: FAMILY MEDICINE

## 2021-07-13 PROCEDURE — 94640 AIRWAY INHALATION TREATMENT: CPT

## 2021-07-13 PROCEDURE — 2700000000 HC OXYGEN THERAPY PER DAY

## 2021-07-13 PROCEDURE — 82962 GLUCOSE BLOOD TEST: CPT

## 2021-07-13 PROCEDURE — 6370000000 HC RX 637 (ALT 250 FOR IP): Performed by: INTERNAL MEDICINE

## 2021-07-13 RX ORDER — LINEZOLID 600 MG/1
600 TABLET, FILM COATED ORAL EVERY 12 HOURS SCHEDULED
Qty: 14 TABLET | Refills: 0
Start: 2021-07-13 | End: 2021-07-20

## 2021-07-13 RX ORDER — CEFDINIR 300 MG/1
300 CAPSULE ORAL EVERY 12 HOURS SCHEDULED
Qty: 14 CAPSULE | Refills: 0
Start: 2021-07-13 | End: 2021-07-20

## 2021-07-13 RX ADMIN — IPRATROPIUM BROMIDE AND ALBUTEROL SULFATE 1 AMPULE: 2.5; .5 SOLUTION RESPIRATORY (INHALATION) at 12:00

## 2021-07-13 RX ADMIN — GABAPENTIN 600 MG: 300 CAPSULE ORAL at 14:05

## 2021-07-13 RX ADMIN — SPIRONOLACTONE 25 MG: 25 TABLET ORAL at 09:18

## 2021-07-13 RX ADMIN — PETROLATUM: 420 OINTMENT TOPICAL at 09:22

## 2021-07-13 RX ADMIN — PANTOPRAZOLE SODIUM 40 MG: 40 TABLET, DELAYED RELEASE ORAL at 05:33

## 2021-07-13 RX ADMIN — METOPROLOL SUCCINATE 100 MG: 100 TABLET, EXTENDED RELEASE ORAL at 09:17

## 2021-07-13 RX ADMIN — GABAPENTIN 600 MG: 300 CAPSULE ORAL at 09:17

## 2021-07-13 RX ADMIN — APIXABAN 5 MG: 5 TABLET, FILM COATED ORAL at 09:18

## 2021-07-13 RX ADMIN — OXYCODONE AND ACETAMINOPHEN 1 TABLET: 5; 325 TABLET ORAL at 04:02

## 2021-07-13 RX ADMIN — IPRATROPIUM BROMIDE AND ALBUTEROL SULFATE 1 AMPULE: 2.5; .5 SOLUTION RESPIRATORY (INHALATION) at 15:54

## 2021-07-13 RX ADMIN — IPRATROPIUM BROMIDE AND ALBUTEROL SULFATE 1 AMPULE: 2.5; .5 SOLUTION RESPIRATORY (INHALATION) at 02:19

## 2021-07-13 RX ADMIN — MICONAZOLE NITRATE: 20.6 POWDER TOPICAL at 09:22

## 2021-07-13 RX ADMIN — IPRATROPIUM BROMIDE AND ALBUTEROL SULFATE 1 AMPULE: 2.5; .5 SOLUTION RESPIRATORY (INHALATION) at 06:15

## 2021-07-13 RX ADMIN — FERROUS SULFATE TAB 325 MG (65 MG ELEMENTAL FE) 325 MG: 325 (65 FE) TAB at 09:17

## 2021-07-13 RX ADMIN — LINEZOLID 600 MG: 600 TABLET ORAL at 09:18

## 2021-07-13 RX ADMIN — INSULIN GLARGINE 45 UNITS: 100 INJECTION, SOLUTION SUBCUTANEOUS at 09:19

## 2021-07-13 RX ADMIN — CEFDINIR 300 MG: 300 CAPSULE ORAL at 09:18

## 2021-07-13 RX ADMIN — INSULIN LISPRO 6 UNITS: 100 INJECTION, SOLUTION INTRAVENOUS; SUBCUTANEOUS at 12:23

## 2021-07-13 RX ADMIN — DILTIAZEM HYDROCHLORIDE 240 MG: 240 CAPSULE, EXTENDED RELEASE ORAL at 09:18

## 2021-07-13 ASSESSMENT — PAIN SCALES - GENERAL
PAINLEVEL_OUTOF10: 0
PAINLEVEL_OUTOF10: 8
PAINLEVEL_OUTOF10: 8

## 2021-07-13 NOTE — PLAN OF CARE
Problem: Skin Integrity:  Goal: Will show no infection signs and symptoms  Description: Will show no infection signs and symptoms  Outcome: Met This Shift     Problem: Skin Integrity:  Goal: Absence of new skin breakdown  Description: Absence of new skin breakdown  Outcome: Met This Shift     Problem: Falls - Risk of:  Goal: Will remain free from falls  Description: Will remain free from falls  Outcome: Met This Shift     Problem: Falls - Risk of:  Goal: Absence of physical injury  Description: Absence of physical injury  Outcome: Met This Shift     Problem: Pain:  Goal: Pain level will decrease  Description: Pain level will decrease  Outcome: Met This Shift     Problem: Pain:  Goal: Control of acute pain  Description: Control of acute pain  Outcome: Met This Shift     Problem: Pain:  Goal: Control of chronic pain  Description: Control of chronic pain  Outcome: Met This Shift

## 2021-07-13 NOTE — CARE COORDINATION
[facility] received auth. Setup ambulance transport via physicians pick-up time 4:30pm. Pt is aware.  provided nursing with rapid covid test to be completed prior to discharge     Sue Hannon

## 2021-07-13 NOTE — DISCHARGE INSTR - COC
COLONOSCOPY  1/20/2004    cecal polyp, bx (no pathologic changes), Dr. Lupe Machado, [facility] COLONOSCOPY  8/11/2006    snare polypectomy terminal ileum polyp (granulation tissue polyp) and anal polyp (inflammatory/papilla), Dr. Jonny Laguerre, [facility] COLONOSCOPY  3/23/2012    bx/cauterization proximal ascending colon polyp, injection of anal sphincter with Botox, Dr. Jonny Laguerre, [facility]    ENDOSCOPY, COLON, DIAGNOSTIC      FRACTURE SURGERY      R leg fracture with surgery for repair    JOINT REPLACEMENT  2003    L knee    DC DEBRIDEMENT, SKIN, SUB-Q TISSUE,MUSCLE,=<20 SQ CM Left 11/30/2018    LEFT LEG EXCISIONAL  DEBRIDEMENT WITH TISSUE BIOPSY performed by Jonas Cherry DPM at [address] [facility] ENDOSCOPY  1/20/2004    gastritis, bx (no H pylori), Dr. Lupe Machado, [address] ENDOSCOPY N/A 6/1/2018    EGD BIOPSY performed by Matt Pathak MD at [address] [facility] N/A 11/9/2018    EGD BIOPSY performed by Matt Pathak MD at [facility] ENDOSCOPY       Immunization History:   Immunization History   Administered Date(s) Administered    Influenza 10/26/2012    Influenza Virus Vaccine 10/08/2013, 09/04/2015    Influenza, High Dose (Fluzone 65 yrs and older) 11/07/2019    Influenza, Quadv, IM, PF (6 mo and older Fluzone, Flulaval, Fluarix, and 3 yrs and older Afluria) 10/24/2016, 12/14/2017    Pneumococcal Polysaccharide ([id number]) 09/04/2015    Zoster Live (Zostavax) 01/02/2015       Active Problems:  Patient Active Problem List   Diagnosis Code    Uncontrolled diabetes mellitus ([facility] E11.65    Depression F32.9    Essential hypertension I10    Hyperlipidemia E78.5    Osteoarthritis M19.90    PAF (paroxysmal atrial fibrillation) (HCC) - currently in SR I48.0    Pulmonary hypertension I27.20    Chronic sinusitis J32.9    Chronic pain G89.29    Steatohepatitis K75.81    Baker's cyst of knee M71.20    Diabetic neuropathy ([facility] E11.40    Iron deficiency E61.1    LVH (left ventricular hypertrophy) I51.7    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    Positive hepatitis C antibody test R76.8    PFO (patent foramen ovale) Q21.1    YEIMI (acute kidney injury) ([facility]) N17.9    Chronic diastolic heart failure ([facility]) I50.32    Physical deconditioning R53.81    Lymphedema of both lower extremities I89.0    Chronic anemia D64.9    Dyspnea on exertion R06.00    Chronic deep vein thrombosis (DVT) of distal vein of right lower extremity ([facility]) I82.5Z1    GI bleed K92.2    Anxiety and depression F41.9, F32.9    Chronic anticoagulation Z79.01    COPD exacerbation ([facility]) J44.1    Acute on chronic respiratory failure with hypoxia ([facility]) J96.21    Blood loss anemia D50.0    Sepsis ([facility]) A41.9    Cellulitis of left lower extremity L03. 80    Poorly controlled diabetes mellitus ([facility] E11.65    Lymphedema I89.0    Septicemia ([facility]) A41.9    Acute diastolic congestive heart failure ([facility]) I50.31    Anemia D64.9    Morbid obesity with BMI of 45.0-49.9, adult ([facility]) E66.01, Z68.42    Acute hypoxemic respiratory failure ([facility]) Z54.11    Diastolic CHF, acute on chronic ([facility]) I50.33    Cellulitis L03.90    Stress fracture of right fibula M84.363A    Atrial fibrillation, currently in sinus rhythm Z86.79    Gastroesophageal reflux disease without esophagitis K21.9    Ulcers of both lower legs ([facility]) L97.919, L97.929    Chronic respiratory failure with hypoxia ([facility]) J96.11    Abscess and cellulitis of gluteal region L02.31, L03.317    CHF (congestive heart failure), NYHA class I, acute on chronic, combined ([facility]) I50.43    Adrenal mass ([facility]) E27.8    Hyperglycemia R73.9    Atrial fibrillation ([facility]) I48.91    Infection due to extended-spectrum beta-lactamase-producing Escherichia coli A49.8, Z16.12    Bilateral cellulitis of lower leg L03.116, L03.115    Ambulatory dysfunction R26.2    Chronic respiratory failure with hypercapnia ([facility]) J96.12    Moderate pulmonary hypertension (HCC) I27.20    QT prolongation R94.31    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[facility] J44.9       Isolation/Infection:   Isolation          Contact        Patient Infection Status     Infection Onset Added Last Indicated Last Indicated By Review Planned Expiration Resolved Resolved By    MRSA  07/12/21 07/12/21 Mary Kay Collins RN        Wound-Right Leg skin 7/5/21. Contact isolation per Dr. Alen Garcia. Resolved    MRSA 07/05/21 07/10/21 07/05/21 Culture, Wound   07/12/21 Mary Kay Collins RN    Wound-Right Leg skin 7/5/21. Contained within C/D/I dressing. Standard precautions indicated. COVID-19 Rule Out 05/03/21 05/03/21 05/03/21 COVID-19, Rapid (Ordered)   05/03/21 Rule-Out Test Resulted    ESBL (Extended Spectrum Beta Lactamase) 04/29/21 05/02/21 04/29/21 Culture, Blood 2   07/07/21 Rayne Severance, RN    Ecoli blood 4/29/21    COVID-19 Rule Out 04/29/21 04/29/21 04/29/21 COVID-19, Rapid (Ordered)   04/29/21 Rule-Out Test Resulted    COVID-19 Rule Out 01/13/21 01/13/21 01/13/21 Respiratory Panel, Molecular, with COVID-19 (Restricted: peds pts or suitable admitted adults) (Ordered)   01/14/21 Mary Kay Collins RN    COVID-19 Rule Out 01/12/21 01/12/21 01/12/21 COVID-19 (Ordered)   01/12/21 Rule-Out Test Resulted    COVID-19 Rule Out 06/02/20 06/02/20 06/02/20 COVID-19 (Ordered)   06/02/20 Rule-Out Test Resulted    COVID-19 Rule Out 05/29/20 05/29/20 05/29/20 COVID-19 (Ordered)   05/29/20 Rule-Out Test Resulted    ESBL (Extended Spectrum Beta Lactamase)  12/04/18 12/04/18 Fernand Councilman, RN   09/24/19 Fernand Councilman, RN    E.  Coli, wound-leg,11/30/18          Nurse Assessment:  Last Vital Signs: BP (!) 125/52   Pulse 71   Temp 97.3 °F (36.3 °C) (Temporal)   Resp 20   Ht 5' 3\" (1.6 m)   Wt 299 lb 12.8 oz (136 kg)   LMP  (LMP Unknown)   SpO2 96%   BMI 53.11 kg/m²     Last documented pain score (0-10 scale): Pain Level: 0  Last Weight:   Wt Readings from Last 1 Encounters:   07/10/21 299 lb 12.8 oz (136 kg)     Mental Status:  oriented and alert    IV Access:  - None    Nursing Mobility/ADLs:  Walking   Assisted  Transfer  Assisted  Bathing  Assisted  Dressing  Assisted  Toileting  Assisted  Feeding  Independent  [address] Delivery   whole    Wound Care Documentation and Therapy:  Wound 04/14/21 Leg Lower;Right (Active)   Number of days: 89       Wound 04/14/21 Leg Left; Lower (Active)   Number of days: 89       Wound 07/07/21 Leg Lower;Right (Active)   Wound Image     07/07/21 1030   Wound Etiology Venous 07/07/21 1030   Dressing Status Clean;Dry; Intact 07/13/21 1003   Wound Cleansed Cleansed with saline 07/12/21 0312   Dressing/Treatment ABD; Ace wrap;Roll gauze; Other (comment) 07/13/21 1003   Dressing Change Due 07/13/21 07/12/21 0312   Wound Length (cm) 15 cm 07/07/21 1030   Wound Width (cm) 30 cm 07/07/21 1030   Wound Depth (cm) 0.2 cm 07/07/21 1030   Wound Surface Area (cm^2) 450 cm^2 07/07/21 1030   Wound Volume (cm^3) 90 cm^3 07/07/21 1030   Wound Assessment Other (Comment) 07/11/21 1535   Drainage Amount None 07/12/21 2141   Drainage Description Serosanguinous 07/12/21 0312   Odor None 07/12/21 2141   Ely-wound Assessment Other (Comment) 07/11/21 1535   Wound Thickness Description not for Pressure Injury Partial thickness 07/12/21 0312   Number of days: 5       Wound 07/07/21 Leg Left; Lower (Active)   Wound Image   07/07/21 1030   Wound Etiology Venous 07/08/21 2000   Dressing Status Clean;Dry; Intact 07/13/21 1003   Wound Cleansed Cleansed with saline 07/12/21 0312   Dressing/Treatment ABD; Ace wrap;Roll gauze; Other (comment) 07/13/21 1003   Dressing Change Due 07/13/21 07/12/21 0312   Wound Length (cm) 14 cm 07/07/21 1030   Wound Width (cm) 10 cm 07/07/21 1030   Wound Depth (cm) 0.2 cm 07/07/21 1030   Wound Surface Area (cm^2) 140 cm^2 07/07/21 1030   Wound Volume (cm^3) 28 cm^3 07/07/21 1030   Wound Assessment Other (Comment) 07/11/21 1535   Drainage Amount None 07/12/21 2141   Drainage Description Serosanguinous 07/12/21 0312   Odor None 07/12/21 2141   Ely-wound Assessment Other (Comment) 07/11/21 1535   Wound Thickness Description not for Pressure Injury Partial thickness 07/12/21 6748   Number of days: 5       Wound 07/07/21 Leg Left;Lateral (Active)   Wound Image   07/07/21 1030   Wound Etiology Venous 07/08/21 2000   Dressing Status Clean;Dry; Intact 07/13/21 1003   Wound Cleansed Cleansed with saline 07/12/21 0312   Dressing/Treatment ABD; Ace wrap;Roll gauze; Other (comment) 07/13/21 1003   Dressing Change Due 07/12/21 07/11/21 1535   Wound Length (cm) 4 cm 07/07/21 1030   Wound Width (cm) 4 cm 07/07/21 1030   Wound Depth (cm) 0.1 cm 07/07/21 1030   Wound Surface Area (cm^2) 16 cm^2 07/07/21 1030   Wound Volume (cm^3) 1.6 cm^3 07/07/21 1030   Wound Assessment Other (Comment) 07/11/21 1535   Drainage Amount None 07/12/21 2141   Drainage Description Serosanguinous 07/12/21 0312   Odor None 07/12/21 2141   Ely-wound Assessment Other (Comment) 07/11/21 1535   Wound Thickness Description not for Pressure Injury Partial thickness 07/12/21 0312   Number of days: 5        Elimination:  Continence:   · Bowel: {YES / DM:49853}  · Bladder: {YES / WJ:81156}  Urinary Catheter: Removal Date 07/13/2021   Colostomy/Ileostomy/Ileal Conduit: {YES / CK:48985}       Date of Last BM: ***    Intake/Output Summary (Last 24 hours) at 7/13/2021 1029  Last data filed at 7/13/2021 0615  Gross per 24 hour   Intake 240 ml   Output 150 ml   Net 90 ml     I/O last 3 completed shifts: In: 600 [P.O.:600]  Out: 150 [Urine:150]    Safety Concerns:      At Risk for Falls    Impairments/Disabilities:      None    Nutrition Therapy:  Current Nutrition Therapy:   - Oral Diet:  Carb Control 3 carbs/meal (1500kcals/day)    Routes of Feeding: Oral  Liquids: {Slp liquid thickness:97273}  Daily Fluid Restriction: no  Last Modified Barium Swallow with Video (Video Swallowing Test): not done    Treatments at the Time of Hospital Discharge:   Respiratory Treatments: ***  Oxygen Therapy:  is on oxygen at 3 L/min per nasal cannula. Ventilator:    - No ventilator support    Rehab Therapies: {THERAPEUTIC INTERVENTION:0379706554}  Weight Bearing Status/Restrictions: No weight bearing restirctions  Other Medical Equipment (for information only, NOT a DME order):  {EQUIPMENT:705012247}  Other Treatments: ***    Patient's personal belongings (please select all that are sent with patient):  clothing    RN SIGNATURE:  Electronically signed by Raghu Navarrete RN on 7/13/21 at 2:40 PM EDT    CASE MANAGEMENT/SOCIAL WORK SECTION    Inpatient Status Date: ***    Readmission Risk Assessment Score:  Readmission Risk              Risk of Unplanned Readmission:  29           Discharging to Facility/ Agency   · Name: [address] [place] [address]  · Address:  · Phone:  · Fax:    Dialysis Facility (if applicable)   · Name:  · Address:  · Dialysis Schedule:  · Phone:  · Fax:    / signature: JADEN Schultz    PHYSICIAN SECTION    Prognosis: Fair    Condition at Discharge: Stable    Rehab Potential (if transferring to Rehab): Fair    Recommended Labs or Other Treatments After Discharge: ***    Physician Certification: I certify the above information and transfer of Vandana Cuevas  is necessary for the continuing treatment of the diagnosis listed and that she requires Intermediate Nursing Care for less 30 days.      Update Admission H&P: {P DME Changes in JYQAQ:[id number]}    PHYSICIAN SIGNATURE:  Larry Morris MD

## 2021-07-13 NOTE — PROGRESS NOTES
Department of Internal Medicine  Infectious Diseases  Progress  Note    C/C :  Chronic lymphedema, leg wound infection     Denies fever or chills  Reports leg pain- improved   Less drainage  Afebrile       Current Facility-Administered Medications   Medication Dose Route Frequency Provider Last Rate Last Admin    linezolid (ZYVOX) tablet 600 mg  600 mg Oral 2 times per day Aundrea Noel MD   600 mg at 07/13/21 0918    cefdinir (OMNICEF) capsule 300 mg  300 mg Oral 2 times per day Aundrea Noel MD   300 mg at 07/13/21 0918    ipratropium-albuterol (DUONEB) nebulizer solution 1 ampule  1 ampule Inhalation Q4H WA Chuckie Cleaning, DO   1 ampule at 07/13/21 1200    white petrolatum ointment   Topical Daily Iraane Bugler, DO   Given at 07/13/21 3357    hydrOXYzine (VISTARIL) capsule 50 mg  50 mg Oral Q4H PRN Luis Alberto Rojo, DO   50 mg at 07/12/21 2145    glucose (GLUTOSE) 40 % oral gel 15 g  15 g Oral PRN Luane Bugler, DO        dextrose 50 % IV solution  12.5 g Intravenous PRN Iraane Vincentler, DO        glucagon (rDNA) injection 1 mg  1 mg Intramuscular PRN Iraane Vincentler, DO        dextrose 5 % solution  100 mL/hr Intravenous PRN Iraane Bugler, DO        insulin lispro (HUMALOG) injection vial 0-18 Units  0-18 Units Subcutaneous TID WC Luane Bugler, DO   6 Units at 07/13/21 1223    insulin lispro (HUMALOG) injection vial 0-9 Units  0-9 Units Subcutaneous Nightly Iraane Bugler, DO   6 Units at 07/12/21 2155    oxyCODONE-acetaminophen (PERCOCET) 5-325 MG per tablet 1 tablet  1 tablet Oral Q4H PRN Genoveva Gan MD   1 tablet at 07/13/21 0402    sodium chloride flush 0.9 % injection 5-40 mL  5-40 mL Intravenous 2 times per day Genoveva Gan MD   10 mL at 07/12/21 1031    sodium chloride flush 0.9 % injection 5-40 mL  5-40 mL Intravenous PRN Genoveva Gan MD        0.9 % sodium chloride infusion  25 mL Intravenous PRN Genoveva Gan MD        ondansetron (ZOFRAN-ODT) disintegrating tablet 4 mg 4 mg Oral Q8H PRN Eugenio Prescott MD        Or    ondansetron (ZOFRAN) injection 4 mg  4 mg Intravenous Q6H PRN Eugenio Prescott MD        polyethylene glycol (GLYCOLAX) packet 17 g  17 g Oral Daily PRN Eugenio Prescott MD        acetaminophen (TYLENOL) tablet 650 mg  650 mg Oral Q6H PRN Eugenio Prescott MD   650 mg at 07/08/21 9751    Or    acetaminophen (TYLENOL) suppository 650 mg  650 mg Rectal Q6H PRN Eugenio Prescott MD        apixaban (ELIQUIS) tablet 5 mg  5 mg Oral BID Eugenio Prescott MD   5 mg at 07/13/21 0918    dilTIAZem (CARDIZEM CD) extended release capsule 240 mg  240 mg Oral BID Eugenio Prescott MD   240 mg at 07/13/21 0918    ezetimibe (ZETIA) tablet 10 mg  10 mg Oral Nightly Eugenio Prescott MD   10 mg at 07/12/21 2144    ferrous sulfate (IRON 325) tablet 325 mg  325 mg Oral Daily with breakfast Eugenio Prescott MD   325 mg at 07/13/21 0917    gabapentin (NEURONTIN) capsule 600 mg  600 mg Oral TID Eugenio Prescott MD   600 mg at 07/13/21 1405    insulin glargine (LANTUS) injection vial 45 Units  45 Units Subcutaneous BID Eugenio Prescott MD   45 Units at 07/13/21 0919    pantoprazole (PROTONIX) tablet 40 mg  40 mg Oral QAM AC Eugenio Prescott MD   40 mg at 07/13/21 0533    metoprolol succinate (TOPROL XL) extended release tablet 100 mg  100 mg Oral BID Eugenio Prescott MD   100 mg at 07/13/21 0917    miconazole (MICOTIN) 2 % powder   Topical BID Eugenio Prescott MD   Given at 07/13/21 3409    white petrolatum ointment   Topical TID PRN Eugenio Prescott MD   Given at 07/13/21 9049    spironolactone (ALDACTONE) tablet 25 mg  25 mg Oral Daily Eugenio Prescott MD   25 mg at 07/13/21 1417    traZODone (DESYREL) tablet 50 mg  50 mg Oral Nightly Eugenio Prescott MD   50 mg at 07/12/21 2147     Allergies   Allergen Reactions    Statins Other (See Comments)     Muscle pains         REVIEW OF SYSTEMS:    CONSTITUTIONAL:  Denies fever, chill or rigors  HEENT: denies blurring of vision or double vision, denies hearing problem  RESPIRATORY: denies cough, shortness of breath, sputum expectoration, chest pain. CARDIOVASCULAR:  Denies palpitation  GASTROINTESTINAL:  Denies abdomen pain, diarrhea or constipation. GENITOURINARY:  Denies burning urination or frequency of urination  INTEGUMENT: Bilateral leg wounds   HEMATOLOGIC/LYMPHATIC:  Denies lymph node swelling, gum bleeding or easy bruising. MUSCULOSKELETAL: Bilateral leg swelling, redness   NEUROLOGICAL:  Denies light headed, dizziness      PHYSICAL EXAM:      Vitals:     BP (!) 125/52   Pulse 71   Temp 97.3 °F (36.3 °C) (Temporal)   Resp 20   Ht 5' 3\" (1.6 m)   Wt 299 lb 12.8 oz (136 kg)   LMP  (LMP Unknown)   SpO2 96%   BMI 53.11 kg/m²     General Appearance:    Awake, alert , morbidly obese    Head:    Normocephalic, atraumatic   Eyes:    No pallor, no icterus,   Ears:    No obvious deformity or drainage.    Nose:   No nasal drainage   Throat:   Mucosa moist, no oral thrush   Neck:   Supple, no lymphadenopathy   Back:     no CVA tenderness   Lungs:     Clear to auscultation bilaterally, no wheeze    Heart:    Regular rate and rhythm, no murmur   Abdomen:     Soft, non-tender, bowel sounds present    Extremities:  Bilateral chronic lymphedema, wounds, ACE wrap on    Pulses:   Dorsalis pedis palpable    Skin:  less drainage             DATA:      CBC with Differential:      Lab Results   Component Value Date    WBC 7.7 07/09/2021    RBC 3.80 07/09/2021    HGB 9.4 07/09/2021    HCT 31.4 07/09/2021     07/09/2021    MCV 82.6 07/09/2021    MCH 24.7 07/09/2021    MCHC 29.9 07/09/2021    RDW 15.6 07/09/2021    NRBC 0.0 12/04/2018    SEGSPCT 85 01/29/2014    METASPCT 3.5 12/04/2018    LYMPHOPCT 17.6 07/09/2021    MONOPCT 8.2 07/09/2021    MYELOPCT 2.6 12/04/2018    BASOPCT 0.5 07/09/2021    MONOSABS 0.63 07/09/2021    LYMPHSABS 1.35 07/09/2021    EOSABS 0.20 07/09/2021    BASOSABS 0.04 07/09/2021       [facility]     Lab Results   Component Value Date     07/09/2021    K 4.4 07/09/2021    K 4.3 07/06/2021    CL 95 07/09/2021    CO2 32 07/09/2021    BUN 33 07/09/2021    CREATININE 1.0 07/09/2021    GFRAA >60 07/09/2021    LABGLOM 55 07/09/2021    GLUCOSE 194 07/09/2021    GLUCOSE 181 12/16/2011    PROT 6.1 07/07/2021    LABALBU 3.5 07/07/2021    LABALBU 4.5 11/02/2011    CALCIUM 9.1 07/09/2021    BILITOT 0.2 07/07/2021    ALKPHOS 94 07/07/2021    AST 8 07/07/2021    ALT 9 07/07/2021         Hepatic Function Panel:    Lab Results   Component Value Date    ALKPHOS 94 07/07/2021    ALT 9 07/07/2021    AST 8 07/07/2021    PROT 6.1 07/07/2021    BILITOT 0.2 07/07/2021    BILIDIR <0.2 12/26/2019    IBILI see below 12/26/2019    LABALBU 3.5 07/07/2021    LABALBU 4.5 11/02/2011       PT/INR:    Lab Results   Component Value Date    PROTIME 19.5 04/29/2021    INR 1.8 04/29/2021       TSH:    Lab Results   Component Value Date    TSH 0.575 01/15/2021       U/A:    Lab Results   Component Value Date    NITRITE negative 07/26/2018    COLORU Yellow 07/06/2021    PHUR 6.0 07/06/2021    WBCUA 1-3 07/06/2021    RBCUA 2-5 07/06/2021    YEAST Present 05/28/2020    BACTERIA RARE 07/06/2021    CLARITYU Clear 07/06/2021    SPECGRAV 1.020 07/06/2021    LEUKOCYTESUR Negative 07/06/2021    UROBILINOGEN 0.2 07/06/2021    BILIRUBINUR Negative 07/06/2021    BILIRUBINUR negative 07/26/2018    BLOODU SMALL 07/06/2021    GLUCOSEU >=1000 07/06/2021    AMORPHOUS FEW 08/14/2020       Vanco  19.9     MICROBIOLOGY:    Wound cx -      Proteus species   Mixed Gram negative rods   Corynebacteria   Abnormal       Organism Staphylococcus aureusAbnormal     WOUND/ABSCESS --    Light growth   Methicillin resistant Staph aureus isolated. Most Methicillin   resistant Staphylococcus are usually resistant to multiple   antibiotics including other B-Lactams, Aminoglycosides,   Macrolides, Clindamycin and Tetracycline. Contact isolation   is indicated.     Narrative:   Vancomycin trough level - 10.6    IMPRESSION:     1. Bilateral leg lymphedema, wound infection, cellulitis both legs   2. Morbid obesity         RECOMMENDATIONS:      1. Linezolid 600 mg po q 12 hr /  Omnicef 300 mg po q 12 hrs  X 1 week  2. Contact isolation

## 2021-07-13 NOTE — DISCHARGE SUMMARY
Hospital Medicine Discharge Summary    Patient ID: Shaq Alicia      Patient's PCP: Zac Moncada    Admit Date: 7/5/2021     Discharge Date:    7/13/2021    Admitting Physician: Jenny Burr MD     Discharge Physician: Nabor Carbajal MD      Condition at discharge: Stable    Disposition: [address]    Discharge Diagnoses: Active Hospital Problems    Diagnosis Date Noted    Anemia [D64.9] 12/16/2018     Priority: High    Chronic anticoagulation [Z79.01] 11/06/2018     Priority: Low    Bilateral cellulitis of lower leg [L03.116, L03.115] 07/05/2021    Ambulatory dysfunction [R26.2] 07/05/2021    Chronic respiratory failure with hypercapnia (HCC) [J96.12] 07/05/2021    Moderate pulmonary hypertension (HCC) [I27.20] 07/05/2021    QT prolongation [R94.31] 07/05/2021    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[address] [J44.9] 07/05/2021    Infection due to extended-spectrum beta-lactamase-producing Escherichia coli [A49.8, Z16.12] 05/03/2021    Atrial fibrillation ([address] [I48.91] 01/13/2021    Ulcers of both lower legs ([address] [L97.919, L97.929] 01/23/2020    Morbid obesity with BMI of 45.0-49.9, adult ([address] [E66.01, Z68.42]     Lymphedema of both lower extremities [I89.0] 04/13/2018    Physical deconditioning [R53.81] 01/19/2018    Chronic diastolic heart failure ([address] [I50.32] 01/15/2018    Essential hypertension [I10] 04/15/2011    Uncontrolled diabetes mellitus ([address] [E11.65] 11/12/2010       The patient was seen and examined on day of discharge and this discharge summary is in conjunction with any daily progress note from day of discharge.     Hospital Course:   Ms. Shaq Alicia, a 77y.o. year old female  who  has a past medical history of (HFpEF) heart failure with preserved ejection fraction ([address] Anal fissure, Anemia, Anxiety and depression, Arthritis, Asthma, Atrial fibrillation ([facility] Blood transfusion, CHF (congestive heart failure) ([facility] COPD (chronic obstructive pulmonary disease) [facility] Disc disorder, DM2 (diabetes mellitus, type 2) ([facility] Fall due to stumbling, GERD (gastroesophageal reflux disease), History of blood transfusion, Hx of blood clots, Hyperlipidemia, Hypertension, Inappropriate sinus tachycardia, LVH (left ventricular hypertrophy), Lymphadenitis, chronic, Occipital neuralgia, Respiratory acidosis, and Sinus tachycardia. Patient was recently in the hospital with ESBL bacteremia due to pyelonephritis sepsis encephalopathy and worsening respiratory failure, CT scan at that time showed enlarging right adrenal mass with possible mass in the left kidney.     This admission patient coming with lower extremity increasing swelling and redness, patient was admitted for bilateral lower extremity cellulitis.  Patient status post IV antibiotic, she continued to her local wound care. Later on she was transitioned to oral antibiotics. Plan is for her to go to SNF today. She is alert, agreeable with the treatment plan. Discussed treatment plan with her. Consults:     IP CONSULT TO INTERNAL MEDICINE  IP CONSULT TO PHARMACY  IP CONSULT TO INFECTIOUS DISEASES  IP CONSULT TO DIETITIAN    Significant Diagnostic Studies:  As above      Discharge Instructions/Follow-up:  As above       Activity: activity as tolerated    Physical Exam:  Vitals:    07/13/21 0809   BP: (!) 125/52   Pulse: 71   Resp: 20   Temp: 97.3 °F (36.3 °C)   SpO2: 96%       Labs:  For convenience and continuity at follow-up the following most recent labs are provided:      CBC:    Lab Results   Component Value Date    WBC 7.7 07/09/2021    HGB 9.4 07/09/2021    HCT 31.4 07/09/2021     07/09/2021       Renal:    Lab Results   Component Value Date     07/09/2021    K 4.4 07/09/2021    K 4.3 07/06/2021    CL 95 07/09/2021    CO2 32 07/09/2021    BUN 33 07/09/2021    CREATININE 1.0 07/09/2021    CALCIUM 9.1 07/09/2021    PHOS 3.2 05/30/2018         Discharge Medications:     Current Discharge Medication List Details   linezolid (ZYVOX) 600 MG tablet Take 1 tablet by mouth every 12 hours for 7 days  Qty: 14 tablet, Refills: 0      cefdinir (OMNICEF) 300 MG capsule Take 1 capsule by mouth every 12 hours for 7 days  Qty: 14 capsule, Refills: 0              Details   insulin lispro, 1 Unit Dial, 100 UNIT/ML SOPN INJECT 35 UNITS 3 TIMES A DAY WITH MEALS PLUS SLIDING SCALE  Qty: 4 pen, Refills: 3      dilTIAZem (CARDIZEM CD) 240 MG extended release capsule Take 1 capsule by mouth 2 times daily  Qty: 30 capsule, Refills: 3      ezetimibe (ZETIA) 10 MG tablet Take 1 tablet by mouth nightly  Qty: 30 tablet, Refills: 3      metoprolol succinate (TOPROL XL) 100 MG extended release tablet Take 1 tablet by mouth 2 times daily  Qty: 30 tablet, Refills: 3      insulin glargine (LANTUS SOLOSTAR) 100 UNIT/ML injection pen Inject 45 Units into the skin 2 times daily 55 units 2 times a day      miconazole (MICOTIN) 2 % powder Apply topically 2 times daily Apply to breasts and abdominal folds      mineral oil-hydrophilic petrolatum (AQUAPHOR) ointment Apply topically 3 times daily as needed for Dry Skin      acetaminophen (TYLENOL) 500 MG tablet Take 1,000 mg by mouth every 6 hours as needed for Pain      escitalopram (LEXAPRO) 20 MG tablet Take 1 tablet by mouth daily  Qty: 30 tablet, Refills: 0      miconazole nitrate 2 % OINT Apply topically 3 times daily as needed (after toileting)  Qty: 1 Tube, Refills: 0      apixaban (ELIQUIS) 5 MG TABS tablet Take 5 mg by mouth 2 times daily      bumetanide (BUMEX) 2 MG tablet Take 2 mg by mouth 2 times daily      gabapentin (NEURONTIN) 400 MG capsule Take 600 mg by mouth 3 times daily.        lansoprazole (PREVACID) 30 MG delayed release capsule Take 30 mg by mouth 2 times daily      traZODone (DESYREL) 50 MG tablet Take 50 mg by mouth nightly      vitamin D (ERGOCALCIFEROL) 1.25 MG (81476 UT) CAPS capsule Take 50,000 Units by mouth once a week Given Monday      omega-3 acid ethyl esters (LOVAZA) 1 g capsule Take 2 capsules by mouth 2 times daily  Qty: 60 capsule, Refills: 3      spironolactone (ALDACTONE) 25 MG tablet Take 1 tablet by mouth daily  Qty: 30 tablet, Refills: 5      ferrous sulfate (IRON 325) 325 (65 Fe) MG tablet Take 1 tablet by mouth daily (with breakfast)  Qty: 90 tablet, Refills: 1    Associated Diagnoses: Iron deficiency           Time Spent on discharge is more than 31 min in the examination, evaluation, counseling and review of medications and discharge plan. Signed:    Lizbet Santos MD   7/13/2021      Thank you Margi Mercado for the opportunity to be involved in this patient's care. If you have any questions or concerns please feel free to contact me. NOTE: This report was transcribed using voice recognition software. Every effort was made to ensure accuracy; however, inadvertent computerized transcription errors may be present.

## 2021-07-13 NOTE — PROGRESS NOTES
Hospitalist Progress Note      PCP: Irma Cruz    Date of Admission: 7/5/2021  Days in the hospital: [facility] 65 22 Course:   Patient is  77years old female presents with a chief complaint weakness and swelling. Patient has history of hypertension hyperlipidemia diabetes diastolic congestive heart failure atrial fibrillation on Eliquis chronic respiratory failure on 3 L oxygen chronic edema with lower extremity pain, DVT on Eliquis COPD asthma exactly depression. Patient was recently in the hospital with ESBL bacteremia due to pyelonephritis sepsis encephalopathy and worsening respiratory failure, CT scan at that time showed enlarging right adrenal mass with possible mass in the left kidney.     This admission patient coming with lower extremity increasing swelling and redness, patient was admitted for bilateral lower extremity cellulitis. Patient status post IV antibiotic, switching to oral antibiotics per ID. Subjective  Patient seen and examined at bedside. Pre-CERT pending at this time. Patient denies any fevers, chills, chest pain, shortness of breath, nausea, vomiting. Exam:    BP (!) 125/52   Pulse 71   Temp 97.3 °F (36.3 °C) (Temporal)   Resp 20   Ht 5' 3\" (1.6 m)   Wt 299 lb 12.8 oz (136 kg)   LMP  (LMP Unknown)   SpO2 96%   BMI 53.11 kg/m²     HEENT: No pallor, no icterus. Respiratory:  CTA, good air entry. Cardiovascular: RRR, no murmur. Abdomen: Soft, non-tender, BS noted. Musculoskeletal: No joint pains or joint swelling noted.    Neurologic: awake, alert and following commands       Assessment/Plan:  Active Hospital Problems    Diagnosis Date Noted    Anemia [D64.9] 12/16/2018     Priority: High    Chronic anticoagulation [Z79.01] 11/06/2018     Priority: Low    Bilateral cellulitis of lower leg [L03.116, L03.115] 07/05/2021    Ambulatory dysfunction [R26.2] 07/05/2021    Chronic respiratory failure with hypercapnia (HCC) [J96.12] 07/05/2021    Moderate pulmonary hypertension ([place] [address] [I27.20] 07/05/2021    QT prolongation [R94.31] 07/05/2021    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[facility] [J44.9] 07/05/2021    Infection due to extended-spectrum beta-lactamase-producing Escherichia coli [A49.8, Z16.12] 05/03/2021    Atrial fibrillation ([place] [address] [I48.91] 01/13/2021    Ulcers of both lower legs ([place] [address] [L97.919, L97.929] 01/23/2020    Morbid obesity with BMI of 45.0-49.9, adult ([place] [address] [E66.01, Z68.42]     Lymphedema of both lower extremities [I89.0] 04/13/2018    Physical deconditioning [R53.81] 01/19/2018    Chronic diastolic heart failure ([place] [address] [I50.32] 01/15/2018    Essential hypertension [I10] 04/15/2011    Uncontrolled diabetes mellitus ([place] [address] [E11.65] 11/12/2010     Plan:  · Continue with linezolid, seen by ID  · Awaiting pre-CERT  · Continue insulin, blood sugars controlled  · Continue anticoagulation, heart rate controlled      Labs:   No results for input(s): WBC, HGB, HCT, PLT in the last 72 hours. No results for input(s): NA, K, CL, CO2, BUN, CREATININE, CALCIUM, PHOS in the last 72 hours. Invalid input(s): MAGNES  No results for input(s): AST, ALT, BILIDIR, BILITOT, ALKPHOS in the last 72 hours. No results for input(s): INR in the last 72 hours. No results for input(s): Eveleth Lager in the last 72 hours.     Medications:  Reviewed    Infusion Medications    dextrose      sodium chloride       Scheduled Medications    linezolid  600 mg Oral 2 times per day    cefdinir  300 mg Oral 2 times per day    ipratropium-albuterol  1 ampule Inhalation Q4H WA    white petrolatum   Topical Daily    insulin lispro  0-18 Units Subcutaneous TID WC    insulin lispro  0-9 Units Subcutaneous Nightly    sodium chloride flush  5-40 mL Intravenous 2 times per day    apixaban  5 mg Oral BID    dilTIAZem  240 mg Oral BID    ezetimibe  10 mg Oral Nightly    ferrous sulfate  325 mg Oral Daily with breakfast    gabapentin  600 mg Oral TID    insulin glargine  45 Units Subcutaneous BID    pantoprazole  40 mg Oral QAM AC    metoprolol succinate  100 mg Oral BID    miconazole   Topical BID    spironolactone  25 mg Oral Daily    traZODone  50 mg Oral Nightly     PRN Meds: hydrOXYzine, glucose, dextrose, glucagon (rDNA), dextrose, oxyCODONE-acetaminophen, sodium chloride flush, sodium chloride, ondansetron **OR** ondansetron, polyethylene glycol, acetaminophen **OR** acetaminophen, white petrolatum      Intake/Output Summary (Last 24 hours) at 7/13/2021 1225  Last data filed at 7/13/2021 0615  Gross per 24 hour   Intake 240 ml   Output 150 ml   Net 90 ml     · Body mass index is 53.11 kg/m². · Diet  · ADULT DIET; Regular; 3 carb choices (45 gm/meal)  · Adult Oral Nutrition Supplement; Low Calorie/High Protein Oral Supplement  · Adult Oral Nutrition Supplement; Wound Healing Oral Supplement    · Code Status  · Full Code       Electronically signed by Ritesh Arellano MD on 7/13/2021 at 12:25 PM  [employer]   Please contact me through [employer]    NOTE: This report was transcribed using voice recognition software. Every effort was made to ensure accuracy; however, inadvertent computerized transcription errors may be present.

## 2021-07-15 LAB
ALBUMIN SERPL-MCNC: 3.6 G/DL (ref 3.5–5.2)
ALP BLD-CCNC: 123 U/L (ref 35–104)
ALT SERPL-CCNC: 29 U/L (ref 0–32)
ANION GAP SERPL CALCULATED.3IONS-SCNC: 11 MMOL/L (ref 7–16)
AST SERPL-CCNC: 16 U/L (ref 0–31)
BASOPHILS ABSOLUTE: 0.02 E9/L (ref 0–0.2)
BASOPHILS RELATIVE PERCENT: 0.4 % (ref 0–2)
BILIRUB SERPL-MCNC: 0.3 MG/DL (ref 0–1.2)
BUN BLDV-MCNC: 22 MG/DL (ref 6–23)
CALCIUM SERPL-MCNC: 9.1 MG/DL (ref 8.6–10.2)
CHLORIDE BLD-SCNC: 91 MMOL/L (ref 98–107)
CO2: 39 MMOL/L (ref 22–29)
CREAT SERPL-MCNC: 0.8 MG/DL (ref 0.5–1)
EOSINOPHILS ABSOLUTE: 0.13 E9/L (ref 0.05–0.5)
EOSINOPHILS RELATIVE PERCENT: 2.8 % (ref 0–6)
GFR AFRICAN AMERICAN: >60
GFR NON-AFRICAN AMERICAN: >60 ML/MIN/1.73
GLUCOSE BLD-MCNC: 282 MG/DL (ref 74–99)
HCT VFR BLD CALC: 30.5 % (ref 34–48)
HEMOGLOBIN: 9.1 G/DL (ref 11.5–15.5)
IMMATURE GRANULOCYTES #: 0.02 E9/L
IMMATURE GRANULOCYTES %: 0.4 % (ref 0–5)
LYMPHOCYTES ABSOLUTE: 1.18 E9/L (ref 1.5–4)
LYMPHOCYTES RELATIVE PERCENT: 25 % (ref 20–42)
MCH RBC QN AUTO: 24.3 PG (ref 26–35)
MCHC RBC AUTO-ENTMCNC: 29.8 % (ref 32–34.5)
MCV RBC AUTO: 81.3 FL (ref 80–99.9)
MONOCYTES ABSOLUTE: 0.51 E9/L (ref 0.1–0.95)
MONOCYTES RELATIVE PERCENT: 10.8 % (ref 2–12)
NEUTROPHILS ABSOLUTE: 2.86 E9/L (ref 1.8–7.3)
NEUTROPHILS RELATIVE PERCENT: 60.6 % (ref 43–80)
PDW BLD-RTO: 15.8 FL (ref 11.5–15)
PLATELET # BLD: 266 E9/L (ref 130–450)
PMV BLD AUTO: 9.4 FL (ref 7–12)
POTASSIUM SERPL-SCNC: 4.1 MMOL/L (ref 3.5–5)
RBC # BLD: 3.75 E12/L (ref 3.5–5.5)
SODIUM BLD-SCNC: 141 MMOL/L (ref 132–146)
TOTAL PROTEIN: 6.6 G/DL (ref 6.4–8.3)
WBC # BLD: 4.7 E9/L (ref 4.5–11.5)

## 2021-07-22 LAB
ANION GAP SERPL CALCULATED.3IONS-SCNC: 7 MMOL/L (ref 7–16)
BASOPHILS ABSOLUTE: 0.05 E9/L (ref 0–0.2)
BASOPHILS RELATIVE PERCENT: 0.8 % (ref 0–2)
BUN BLDV-MCNC: 24 MG/DL (ref 6–23)
CALCIUM SERPL-MCNC: 9.3 MG/DL (ref 8.6–10.2)
CHLORIDE BLD-SCNC: 98 MMOL/L (ref 98–107)
CO2: 40 MMOL/L (ref 22–29)
CREAT SERPL-MCNC: 1 MG/DL (ref 0.5–1)
EOSINOPHILS ABSOLUTE: 0.18 E9/L (ref 0.05–0.5)
EOSINOPHILS RELATIVE PERCENT: 2.8 % (ref 0–6)
GFR AFRICAN AMERICAN: >60
GFR NON-AFRICAN AMERICAN: 55 ML/MIN/1.73
GLUCOSE BLD-MCNC: 178 MG/DL (ref 74–99)
HCT VFR BLD CALC: 30.1 % (ref 34–48)
HEMOGLOBIN: 9 G/DL (ref 11.5–15.5)
IMMATURE GRANULOCYTES #: 0.02 E9/L
IMMATURE GRANULOCYTES %: 0.3 % (ref 0–5)
LYMPHOCYTES ABSOLUTE: 1.77 E9/L (ref 1.5–4)
LYMPHOCYTES RELATIVE PERCENT: 27.7 % (ref 20–42)
MCH RBC QN AUTO: 24.3 PG (ref 26–35)
MCHC RBC AUTO-ENTMCNC: 29.9 % (ref 32–34.5)
MCV RBC AUTO: 81.4 FL (ref 80–99.9)
MONOCYTES ABSOLUTE: 0.45 E9/L (ref 0.1–0.95)
MONOCYTES RELATIVE PERCENT: 7.1 % (ref 2–12)
NEUTROPHILS ABSOLUTE: 3.91 E9/L (ref 1.8–7.3)
NEUTROPHILS RELATIVE PERCENT: 61.3 % (ref 43–80)
PDW BLD-RTO: 15.3 FL (ref 11.5–15)
PLATELET # BLD: 242 E9/L (ref 130–450)
PMV BLD AUTO: 9.5 FL (ref 7–12)
POTASSIUM SERPL-SCNC: 3.9 MMOL/L (ref 3.5–5)
RBC # BLD: 3.7 E12/L (ref 3.5–5.5)
SODIUM BLD-SCNC: 145 MMOL/L (ref 132–146)
WBC # BLD: 6.4 E9/L (ref 4.5–11.5)

## 2021-08-04 NOTE — TELEPHONE ENCOUNTER
Any discharge?
Diflucan sent
Left message notifying patient of script
More info needed, is it daily? What color is it? Any dysuria?
Pt called in today to check status on this, yes pt is having discharge.
Pt called stated she is unable to tell which color discharge is because she is wheelchair bound, but she feels the extra discharge,  also states no odor, and she is itchy in vaginal area, no pain, or burning with urination
Pt calling for an rx, she thinks she has a yeast infection. C/o itching since yesterday. CVs on [facility].
LVH

## 2021-08-18 ENCOUNTER — APPOINTMENT (OUTPATIENT)
Dept: CT IMAGING | Age: 66
DRG: 720 | End: 2021-08-18
Payer: MEDICAID

## 2021-08-18 ENCOUNTER — APPOINTMENT (OUTPATIENT)
Dept: GENERAL RADIOLOGY | Age: 66
DRG: 720 | End: 2021-08-18
Payer: MEDICAID

## 2021-08-18 ENCOUNTER — HOSPITAL ENCOUNTER (INPATIENT)
Age: 66
LOS: 5 days | Discharge: NURSING, REHAB OR LONG-TERM CARE | DRG: 720 | End: 2021-08-23
Attending: EMERGENCY MEDICINE | Admitting: FAMILY MEDICINE
Payer: MEDICAID

## 2021-08-18 DIAGNOSIS — I48.20 CHRONIC ATRIAL FIBRILLATION WITH RVR (HCC): ICD-10-CM

## 2021-08-18 DIAGNOSIS — N28.9 RENAL LESION: ICD-10-CM

## 2021-08-18 DIAGNOSIS — I89.0 LYMPHEDEMA: ICD-10-CM

## 2021-08-18 DIAGNOSIS — A41.9 SEPSIS WITHOUT ACUTE ORGAN DYSFUNCTION, DUE TO UNSPECIFIED ORGANISM (HCC): Primary | ICD-10-CM

## 2021-08-18 LAB
ADENOVIRUS BY PCR: NOT DETECTED
ALBUMIN SERPL-MCNC: 3.1 G/DL (ref 3.5–5.2)
ALP BLD-CCNC: 111 U/L (ref 35–104)
ALT SERPL-CCNC: 11 U/L (ref 0–32)
ANION GAP SERPL CALCULATED.3IONS-SCNC: 12 MMOL/L (ref 7–16)
AST SERPL-CCNC: 13 U/L (ref 0–31)
BACTERIA: ABNORMAL /HPF
BILIRUB SERPL-MCNC: 0.8 MG/DL (ref 0–1.2)
BILIRUBIN URINE: ABNORMAL
BLOOD, URINE: NEGATIVE
BORDETELLA PARAPERTUSSIS BY PCR: NOT DETECTED
BORDETELLA PERTUSSIS BY PCR: NOT DETECTED
BUN BLDV-MCNC: 49 MG/DL (ref 6–23)
CALCIUM SERPL-MCNC: 9.1 MG/DL (ref 8.6–10.2)
CHLAMYDOPHILIA PNEUMONIAE BY PCR: NOT DETECTED
CHLORIDE BLD-SCNC: 89 MMOL/L (ref 98–107)
CLARITY: CLEAR
CO2: 28 MMOL/L (ref 22–29)
COLOR: YELLOW
CORONAVIRUS 229E BY PCR: NOT DETECTED
CORONAVIRUS HKU1 BY PCR: NOT DETECTED
CORONAVIRUS NL63 BY PCR: NOT DETECTED
CORONAVIRUS OC43 BY PCR: NOT DETECTED
CREAT SERPL-MCNC: 1.7 MG/DL (ref 0.5–1)
EKG ATRIAL RATE: 125 BPM
EKG Q-T INTERVAL: 334 MS
EKG QRS DURATION: 98 MS
EKG QTC CALCULATION (BAZETT): 491 MS
EKG R AXIS: 17 DEGREES
EKG T AXIS: 30 DEGREES
EKG VENTRICULAR RATE: 130 BPM
EPITHELIAL CELLS, UA: ABNORMAL /HPF
GFR AFRICAN AMERICAN: 36
GFR NON-AFRICAN AMERICAN: 30 ML/MIN/1.73
GLUCOSE BLD-MCNC: 359 MG/DL (ref 74–99)
GLUCOSE URINE: NEGATIVE MG/DL
HCT VFR BLD CALC: 31.2 % (ref 34–48)
HEMOGLOBIN: 9.8 G/DL (ref 11.5–15.5)
HUMAN METAPNEUMOVIRUS BY PCR: NOT DETECTED
HUMAN RHINOVIRUS/ENTEROVIRUS BY PCR: NOT DETECTED
INFLUENZA A BY PCR: NOT DETECTED
INFLUENZA B BY PCR: NOT DETECTED
KETONES, URINE: ABNORMAL MG/DL
LACTIC ACID, SEPSIS: 2.4 MMOL/L (ref 0.5–1.9)
LACTIC ACID, SEPSIS: 2.5 MMOL/L (ref 0.5–1.9)
LEUKOCYTE ESTERASE, URINE: NEGATIVE
LIPASE: 8 U/L (ref 13–60)
MCH RBC QN AUTO: 24.9 PG (ref 26–35)
MCHC RBC AUTO-ENTMCNC: 31.4 % (ref 32–34.5)
MCV RBC AUTO: 79.4 FL (ref 80–99.9)
MYCOPLASMA PNEUMONIAE BY PCR: NOT DETECTED
NITRITE, URINE: NEGATIVE
PARAINFLUENZA VIRUS 1 BY PCR: NOT DETECTED
PARAINFLUENZA VIRUS 2 BY PCR: NOT DETECTED
PARAINFLUENZA VIRUS 3 BY PCR: NOT DETECTED
PARAINFLUENZA VIRUS 4 BY PCR: NOT DETECTED
PDW BLD-RTO: 16.9 FL (ref 11.5–15)
PH UA: 5 (ref 5–9)
PLATELET # BLD: 205 E9/L (ref 130–450)
PMV BLD AUTO: 10.1 FL (ref 7–12)
POTASSIUM SERPL-SCNC: 5.2 MMOL/L (ref 3.5–5)
PROTEIN UA: NEGATIVE MG/DL
RBC # BLD: 3.93 E12/L (ref 3.5–5.5)
RBC UA: ABNORMAL /HPF (ref 0–2)
RESPIRATORY SYNCYTIAL VIRUS BY PCR: NOT DETECTED
SARS-COV-2, PCR: NOT DETECTED
SODIUM BLD-SCNC: 129 MMOL/L (ref 132–146)
SPECIFIC GRAVITY UA: 1.02 (ref 1–1.03)
TOTAL PROTEIN: 7 G/DL (ref 6.4–8.3)
TROPONIN, HIGH SENSITIVITY: 25 NG/L (ref 0–9)
TROPONIN, HIGH SENSITIVITY: 30 NG/L (ref 0–9)
UROBILINOGEN, URINE: 1 E.U./DL
WBC # BLD: 8.8 E9/L (ref 4.5–11.5)
WBC UA: ABNORMAL /HPF (ref 0–5)

## 2021-08-18 PROCEDURE — 84484 ASSAY OF TROPONIN QUANT: CPT

## 2021-08-18 PROCEDURE — 71045 X-RAY EXAM CHEST 1 VIEW: CPT

## 2021-08-18 PROCEDURE — 87088 URINE BACTERIA CULTURE: CPT

## 2021-08-18 PROCEDURE — 2580000003 HC RX 258: Performed by: STUDENT IN AN ORGANIZED HEALTH CARE EDUCATION/TRAINING PROGRAM

## 2021-08-18 PROCEDURE — 0202U NFCT DS 22 TRGT SARS-COV-2: CPT

## 2021-08-18 PROCEDURE — 93005 ELECTROCARDIOGRAM TRACING: CPT | Performed by: NURSE PRACTITIONER

## 2021-08-18 PROCEDURE — 96374 THER/PROPH/DIAG INJ IV PUSH: CPT

## 2021-08-18 PROCEDURE — 87077 CULTURE AEROBIC IDENTIFY: CPT

## 2021-08-18 PROCEDURE — 6360000002 HC RX W HCPCS: Performed by: STUDENT IN AN ORGANIZED HEALTH CARE EDUCATION/TRAINING PROGRAM

## 2021-08-18 PROCEDURE — 87186 SC STD MICRODIL/AGAR DIL: CPT

## 2021-08-18 PROCEDURE — 80053 COMPREHEN METABOLIC PANEL: CPT

## 2021-08-18 PROCEDURE — 81001 URINALYSIS AUTO W/SCOPE: CPT

## 2021-08-18 PROCEDURE — 6370000000 HC RX 637 (ALT 250 FOR IP): Performed by: STUDENT IN AN ORGANIZED HEALTH CARE EDUCATION/TRAINING PROGRAM

## 2021-08-18 PROCEDURE — 93010 ELECTROCARDIOGRAM REPORT: CPT | Performed by: INTERNAL MEDICINE

## 2021-08-18 PROCEDURE — 51702 INSERT TEMP BLADDER CATH: CPT

## 2021-08-18 PROCEDURE — 85027 COMPLETE CBC AUTOMATED: CPT

## 2021-08-18 PROCEDURE — 83690 ASSAY OF LIPASE: CPT

## 2021-08-18 PROCEDURE — 36415 COLL VENOUS BLD VENIPUNCTURE: CPT

## 2021-08-18 PROCEDURE — 83605 ASSAY OF LACTIC ACID: CPT

## 2021-08-18 PROCEDURE — 74176 CT ABD & PELVIS W/O CONTRAST: CPT

## 2021-08-18 PROCEDURE — 96361 HYDRATE IV INFUSION ADD-ON: CPT

## 2021-08-18 PROCEDURE — 94640 AIRWAY INHALATION TREATMENT: CPT

## 2021-08-18 PROCEDURE — 87150 DNA/RNA AMPLIFIED PROBE: CPT

## 2021-08-18 PROCEDURE — 87040 BLOOD CULTURE FOR BACTERIA: CPT

## 2021-08-18 PROCEDURE — 99285 EMERGENCY DEPT VISIT HI MDM: CPT

## 2021-08-18 PROCEDURE — 2060000000 HC ICU INTERMEDIATE R&B

## 2021-08-18 PROCEDURE — 84443 ASSAY THYROID STIM HORMONE: CPT

## 2021-08-18 RX ORDER — ACETAMINOPHEN 500 MG
1000 TABLET ORAL ONCE
Status: COMPLETED | OUTPATIENT
Start: 2021-08-18 | End: 2021-08-18

## 2021-08-18 RX ORDER — ALBUTEROL SULFATE 2.5 MG/3ML
2.5 SOLUTION RESPIRATORY (INHALATION) ONCE
Status: COMPLETED | OUTPATIENT
Start: 2021-08-18 | End: 2021-08-18

## 2021-08-18 RX ORDER — METOPROLOL SUCCINATE 25 MG/1
100 TABLET, EXTENDED RELEASE ORAL ONCE
Status: COMPLETED | OUTPATIENT
Start: 2021-08-18 | End: 2021-08-18

## 2021-08-18 RX ORDER — 0.9 % SODIUM CHLORIDE 0.9 %
30 INTRAVENOUS SOLUTION INTRAVENOUS ONCE
Status: COMPLETED | OUTPATIENT
Start: 2021-08-18 | End: 2021-08-18

## 2021-08-18 RX ORDER — 0.9 % SODIUM CHLORIDE 0.9 %
1000 INTRAVENOUS SOLUTION INTRAVENOUS ONCE
Status: DISCONTINUED | OUTPATIENT
Start: 2021-08-18 | End: 2021-08-18

## 2021-08-18 RX ORDER — PROCHLORPERAZINE EDISYLATE 5 MG/ML
10 INJECTION INTRAMUSCULAR; INTRAVENOUS ONCE
Status: COMPLETED | OUTPATIENT
Start: 2021-08-18 | End: 2021-08-18

## 2021-08-18 RX ADMIN — METOPROLOL SUCCINATE 100 MG: 25 TABLET, FILM COATED, EXTENDED RELEASE ORAL at 21:15

## 2021-08-18 RX ADMIN — ACETAMINOPHEN 1000 MG: 500 TABLET ORAL at 13:26

## 2021-08-18 RX ADMIN — PROCHLORPERAZINE EDISYLATE 10 MG: 5 INJECTION INTRAMUSCULAR; INTRAVENOUS at 13:26

## 2021-08-18 RX ADMIN — SODIUM CHLORIDE 3606 ML: 9 INJECTION, SOLUTION INTRAVENOUS at 13:30

## 2021-08-18 RX ADMIN — ALBUTEROL SULFATE 2.5 MG: 2.5 SOLUTION RESPIRATORY (INHALATION) at 18:37

## 2021-08-18 ASSESSMENT — PAIN SCALES - GENERAL
PAINLEVEL_OUTOF10: 6
PAINLEVEL_OUTOF10: 10

## 2021-08-18 NOTE — ED TRIAGE NOTES
FIRST PROVIDER CONTACT ASSESSMENT NOTE                                                                                                Department of Emergency Medicine                                                      First Provider Note  21  9:36 AM EDT  NAME: Roberta Parry  : 1955  MRN: [id number]    Chief Complaint: Abdominal Pain (periumbilical pain 2 days ), Nausea (cant keep anything down, 2 days ), and Emesis      History of Present Illness:   Roberta Parry is a 77 y.o. female who presents to the ED for abd pain, emesis, nausea    Focused Physical Exam:  VS:    ED Triage Vitals   BP Temp Temp src Pulse Resp SpO2 Height Weight   -- -- -- -- -- -- -- --        General: Alert and in no apparent distress. Medical History:  has a past medical history of (HFpEF) heart failure with preserved ejection fraction ([address] Anal fissure, Anemia, Anxiety and depression, Arthritis, Asthma, Atrial fibrillation ([address] Blood transfusion, CHF (congestive heart failure) ([address] COPD (chronic obstructive pulmonary disease) ([address] Disc disorder, DM2 (diabetes mellitus, type 2) ([address] Fall due to stumbling, GERD (gastroesophageal reflux disease), History of blood transfusion, Hx of blood clots, Hyperlipidemia, Hypertension, Inappropriate sinus tachycardia, LVH (left ventricular hypertrophy), Lymphadenitis, chronic, Occipital neuralgia, Respiratory acidosis, and Sinus tachycardia. Surgical History:  has a past surgical history that includes Tonsillectomy (); Appendectomy ();  section ();  section ();  section (); Anus surgery (2006); Upper gastrointestinal endoscopy (2004); Colonoscopy (2004); Colonoscopy (2006); anoscopy (10/25/2006); anoscopy (2007); anoscopy (2007); Colonoscopy (3/23/2012); Anus surgery (2012); Upper gastrointestinal endoscopy (N/A, 2018);  Upper gastrointestinal endoscopy (N/A, 2018); pr debridement, skin, sub-q tissue,muscle,=<20 sq cm (Left, 11/30/2018); Endoscopy, colon, diagnostic; fracture surgery; and joint replacement (2003). Social History:  reports that she quit smoking about 16 years ago. Her smoking use included cigarettes. She started smoking about 41 years ago. She has a 37.50 pack-year smoking history. She has never used smokeless tobacco. She reports previous alcohol use. She reports previous drug use. Family History: family history includes Colon Cancer in her father and sister; Diabetes in her father, paternal aunt, paternal aunt, paternal uncle, paternal uncle, and sister; Heart Disease in her mother; Other in her father and sister.     Allergies: Statins     Initial Plan of Care:  Initiate Treatment-Testing, Proceed toTreatment Area When Bed Available for ED Attending/MLP to Continue Care    -------------------------------------------------END OF FIRST PROVIDER CONTACT ASSESSMENT NOTE--------------------------------------------------------  Electronically signed by RAUL Blankenship CNP   DD: 8/18/21

## 2021-08-18 NOTE — ED PROVIDER NOTES
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 Patient states she has had abdominal pain x2 days, not being able to tolerate food or fluids p.o. Patient has endorsed early episodes of nausea, one episode of vomiting however mostly it has been persistent unable to tolerate food. Patient endorses generalized abdominal pain, nonradiating, nothing makes better, nothing makes it worse, aching, constant, moderate intensity. Patient denies any urinary symptoms, denies any diarrhea or black tarry stools. Patient states she has not been able to take her medications for the last day including her Cardizem, metoprolol and Eliquis. Patient states wound care by home health care with dressing changed on 8/17/2021. As per EMR patient was seen in July 2021 with ID seeing her legs, discharged on linezolid and Omnicef. Review of Systems   Constitutional: Negative for chills, fatigue and fever. HENT: Negative for congestion, rhinorrhea and voice change. Eyes: Negative for visual disturbance. Respiratory: Negative for cough and shortness of breath. Cardiovascular: Positive for leg swelling. Negative for chest pain and palpitations. Gastrointestinal: Positive for abdominal pain, nausea and vomiting. Negative for constipation and diarrhea. Genitourinary: Negative for dysuria, frequency, hematuria and urgency. Musculoskeletal: Negative for arthralgias and myalgias.    Skin: Positive for rash and wound. Neurological: Negative for dizziness, syncope, weakness, light-headedness, numbness and headaches. Psychiatric/Behavioral: Negative for confusion. The patient is not nervous/anxious. Physical Exam  Vitals and nursing note reviewed. Constitutional:       General: She is awake. She is not in acute distress. Appearance: She is morbidly obese. She is ill-appearing. She is not toxic-appearing. HENT:      Head: Normocephalic and atraumatic. Nose: Nose normal.      Mouth/Throat:      Mouth: Mucous membranes are dry. Pharynx: Oropharynx is clear. Eyes:      Extraocular Movements: Extraocular movements intact. Pupils: Pupils are equal, round, and reactive to light. Cardiovascular:      Rate and Rhythm: Normal rate and regular rhythm. Pulses:           Radial pulses are 2+ on the right side and 2+ on the left side. Dorsalis pedis pulses are 1+ on the right side and 1+ on the left side. Heart sounds: Normal heart sounds. Comments: Severe lymphedema noted to bilateral legs with wounds and cellulitis. Pulmonary:      Effort: Pulmonary effort is normal.      Breath sounds: Normal breath sounds. Abdominal:      General: There is no distension. Palpations: Abdomen is soft. Tenderness: There is generalized abdominal tenderness. Negative signs include Cabrera's sign and McBurney's sign. Musculoskeletal:         General: Swelling present. Cervical back: Normal range of motion and neck supple. Right lower leg: Swelling present. Left lower leg: Swelling present. Skin:     General: Skin is warm and dry. Capillary Refill: Capillary refill takes less than 2 seconds. Neurological:      General: No focal deficit present. Mental Status: She is alert and oriented to person, place, and time.                             MDM  Number of Diagnoses or Management Options  Chronic atrial fibrillation with RVR (HCC)  Lymphedema  Renal lesion  Sepsis without acute organ dysfunction, due to unspecified organism [facility])  Diagnosis management comments: Patient is a 76-year-old female who presents to the emergency department with complaint of abdominal pain, nausea and vomiting, unable to tolerate food or fluids, including her medications for the past 2 days. Patient with generalized abdominal tenderness. Patient presents awake, alert, ill-appearing. Vital signs noted showing tachycardia, no hypotension, afebrile. Patient physical exam shows generally unwell, uncapped, large lymphedema noted to bilateral legs with wounds that were dressed, weeping. 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 Patient has not been able to tolerate any of her medications including her rate control medications for her A. fib. Patient with multiple complaints on her multiple reevaluations, she was treated with Tylenol for pain, as well as Lopressor for persistent tachycardia with A. fib RVR. Patient appeared to be septic on physical exam and evaluation and given IV fluids which mildly decreased her heart rate, but then beta-blockers were needed, she has not been able to tolerate these at home. Tachycardia is likely second to sepsis as well as medication noncompliance. Labs show no leukocytosis, patient with YEIMI, mild hyponatremia and kalemia at 5.2. Patient with lactic acidosis, troponin was elevated. Urinalysis not consistent with urinary tract infection. EKG shows A. fib with RVR. Chest x-ray no acute pathology, abdominal CT scan shows a 4.5 cm renal lesion, this is likely not the cause of her abdominal pain. Patient was monitored in the emergency department, given antiemetics as well as analgesic, antipyretic, and decision was made to admit based on likely sepsis second to infections to her legs. Legs were undressed and appear to be well-healing.   EMR was extensively reviewed showing patient with drug resistance to her wounds, and has been followed by infectious disease. And hospitalist was consulted and patient was admitted to their service, patient was extensively discussed, as well as findings and likely need for infectious disease. Patient was monitored in the emergency department without further change and was admitted to monitored bed in stable condition. Amount and/or Complexity of Data Reviewed  Clinical lab tests: ordered and reviewed  Tests in the radiology section of CPT®: ordered and reviewed       --------------------------------------------- PAST HISTORY ---------------------------------------------  Past Medical History:  has a past medical history of (HFpEF) heart failure with preserved ejection fraction ([address] Anal fissure, Anemia, Anxiety and depression, Arthritis, Asthma, Atrial fibrillation ([address] Blood transfusion, CHF (congestive heart failure) ([address] COPD (chronic obstructive pulmonary disease) ([address] Disc disorder, DM2 (diabetes mellitus, type 2) ([facility] [address] Fall due to stumbling, GERD (gastroesophageal reflux disease), History of blood transfusion, Hx of blood clots, Hyperlipidemia, Hypertension, Inappropriate sinus tachycardia, LVH (left ventricular hypertrophy), Lymphadenitis, chronic, Occipital neuralgia, Respiratory acidosis, and Sinus tachycardia. Past Surgical History:  has a past surgical history that includes Tonsillectomy (); Appendectomy (1965);  section ();  section ();  section (); Anus surgery (2006); Upper gastrointestinal endoscopy (2004); Colonoscopy (2004); Colonoscopy (2006); anoscopy (10/25/2006); anoscopy (2007); anoscopy (2007); Colonoscopy (3/23/2012); Anus surgery (2012); Upper gastrointestinal endoscopy (N/A, 2018); Upper gastrointestinal endoscopy (N/A, 2018); pr debridement, skin, sub-q tissue,muscle,=<20 sq cm (Left, 2018);  Endoscopy, colon, diagnostic; fracture surgery; and joint replacement (2003). Social History:  reports that she quit smoking about 16 years ago. Her smoking use included cigarettes. She started smoking about 41 years ago. She has a 37.50 pack-year smoking history. She has never used smokeless tobacco. She reports previous alcohol use. She reports previous drug use. Family History: family history includes Colon Cancer in her father and sister; Diabetes in her father, paternal aunt, paternal aunt, paternal uncle, paternal uncle, and sister; Heart Disease in her mother; Other in her father and sister. The patients home medications have been reviewed.     Allergies: Statins    -------------------------------------------------- RESULTS -------------------------------------------------    LABS:  Results for orders placed or performed during the hospital encounter of 08/18/21   Respiratory Panel, Molecular, with COVID-19 (Restricted: peds pts or suitable admitted adults)    Specimen: Nasopharyngeal   Result Value Ref Range    Adenovirus by PCR Not Detected Not Detected    Bordetella parapertussis by PCR Not Detected Not Detected    Bordetella pertussis by PCR Not Detected Not Detected    Chlamydophilia pneumoniae by PCR Not Detected Not Detected    Coronavirus 229E by PCR Not Detected Not Detected    Coronavirus HKU1 by PCR Not Detected Not Detected    Coronavirus NL63 by PCR Not Detected Not Detected    Coronavirus OC43 by PCR Not Detected Not Detected    SARS-CoV-2, PCR Not Detected Not Detected    Human Metapneumovirus by PCR Not Detected Not Detected    Human Rhinovirus/Enterovirus by PCR Not Detected Not Detected    Influenza A by PCR Not Detected Not Detected    Influenza B by PCR Not Detected Not Detected    Mycoplasma pneumoniae by PCR Not Detected Not Detected    Parainfluenza Virus 1 by PCR Not Detected Not Detected    Parainfluenza Virus 2 by PCR Not Detected Not Detected    Parainfluenza Virus 3 by PCR Not Detected Not Detected Parainfluenza Virus 4 by PCR Not Detected Not Detected    Respiratory Syncytial Virus by PCR Not Detected Not Detected   Culture, Blood 1    Specimen: Blood   Result Value Ref Range    Organism Shewanella algae (A)     Blood Culture, Routine       Validated susceptibility testing methods do not exist for  this isolate. Culture, Blood 2    Specimen: Blood   Result Value Ref Range    Organism Shewanella algae (A)     Culture, Blood 2       Validated susceptibility testing methods do not exist for  this isolate.      Culture, Urine    Specimen: Urine, clean catch   Result Value Ref Range    Urine Culture, Routine       <10,000 CFU/mL  Gram negative rods  Mixed gram positive organisms     CBC   Result Value Ref Range    WBC 8.8 4.5 - 11.5 E9/L    RBC 3.93 3.50 - 5.50 E12/L    Hemoglobin 9.8 (L) 11.5 - 15.5 g/dL    Hematocrit 31.2 (L) 34.0 - 48.0 %    MCV 79.4 (L) 80.0 - 99.9 fL    MCH 24.9 (L) 26.0 - 35.0 pg    MCHC 31.4 (L) 32.0 - 34.5 %    RDW 16.9 (H) 11.5 - 15.0 fL    Platelets 967 317 - 833 E9/L    MPV 10.1 7.0 - 12.0 fL   Comprehensive Metabolic Panel   Result Value Ref Range    Sodium 129 (L) 132 - 146 mmol/L    Potassium 5.2 (H) 3.5 - 5.0 mmol/L    Chloride 89 (L) 98 - 107 mmol/L    CO2 28 22 - 29 mmol/L    Anion Gap 12 7 - 16 mmol/L    Glucose 359 (H) 74 - 99 mg/dL    BUN 49 (H) 6 - 23 mg/dL    CREATININE 1.7 (H) 0.5 - 1.0 mg/dL    GFR Non-African American 30 >=60 mL/min/1.73    GFR African American 36     Calcium 9.1 8.6 - 10.2 mg/dL    Total Protein 7.0 6.4 - 8.3 g/dL    Albumin 3.1 (L) 3.5 - 5.2 g/dL    Total Bilirubin 0.8 0.0 - 1.2 mg/dL    Alkaline Phosphatase 111 (H) 35 - 104 U/L    ALT 11 0 - 32 U/L    AST 13 0 - 31 U/L   Lactate, Sepsis   Result Value Ref Range    Lactic Acid, Sepsis 2.5 (H) 0.5 - 1.9 mmol/L   Lactate, Sepsis   Result Value Ref Range    Lactic Acid, Sepsis 2.4 (H) 0.5 - 1.9 mmol/L   Lipase   Result Value Ref Range    Lipase 8 (L) 13 - 60 U/L   Troponin   Result Value Ref Range Troponin, High Sensitivity 30 (H) 0 - 9 ng/L   Urinalysis with Microscopic   Result Value Ref Range    Color, UA Yellow Straw/Yellow    Clarity, UA Clear Clear    Glucose, Ur Negative Negative mg/dL    Bilirubin Urine SMALL (A) Negative    Ketones, Urine TRACE (A) Negative mg/dL    Specific Gravity, UA 1.025 1.005 - 1.030    Blood, Urine Negative Negative    pH, UA 5.0 5.0 - 9.0    Protein, UA Negative Negative mg/dL    Urobilinogen, Urine 1.0 <2.0 E.U./dL    Nitrite, Urine Negative Negative    Leukocyte Esterase, Urine Negative Negative    WBC, UA NONE 0 - 5 /HPF    RBC, UA 1-3 0 - 2 /HPF    Epithelial Cells, UA FEW /HPF    Bacteria, UA MANY (A) None Seen /HPF   Troponin   Result Value Ref Range    Troponin, High Sensitivity 25 (H) 0 - 9 ng/L   Blood ID, Molecular   Result Value Ref Range    Bottle Type Aerobic     Source of Blood Culture No site given     Order Number [id number]     Enterobacteriaceae by PCR Not Detected Not Detected    Enterobacter cloacae complex by PCR Not Detected Not Detected    Escherichia coli by PCR Not Detected Not Detected    Haemophilus Influenzae by PCR Not Detected Not Detected    Klebsiella oxytoca by PCR Not Detected Not Detected    Klebsiella pneumoniae group by PCR Not Detected Not Detected    Listeria monocytogenes by PCR Not Detected Not Detected    Neisseria meningitidis by PCR Not Detected Not Detected    Proteus species by PCR Not Detected Not Detected    Pseudomonas aeruginosa by PCR Not Detected Not Detected    Streptococcus agalactiae by PCR Not Detected Not Detected    Staphylococcus aureus by PCR Not Detected Not Detected    Staphylococcus species by PCR Not Detected Not Detected    Serratia marcescens by PCR Not Detected Not Detected    Streptococcus pneumoniae by PCR Not Detected Not Detected    Streptococcus pyogenes  by PCR Not Detected Not Detected    Streptococcus species by PCR Not Detected Not Detected    Candida albicans by PCR Not Detected Not Detected Candida glabrata by PCR Not Detected Not Detected    Candida krusei by PCR Not Detected Not Detected    Candida parapsilosis by PCR Not Detected Not Detected    Candida tropicalis by PCR Not Detected Not Detected   TSH WITHOUT REFLEX   Result Value Ref Range    TSH 1.890 0.270 - 4.200 uIU/mL   Comprehensive Metabolic Panel w/ Reflex to MG   Result Value Ref Range    Sodium 130 (L) 132 - 146 mmol/L    Potassium reflex Magnesium 4.2 3.5 - 5.0 mmol/L    Chloride 93 (L) 98 - 107 mmol/L    CO2 24 22 - 29 mmol/L    Anion Gap 13 7 - 16 mmol/L    Glucose 321 (H) 74 - 99 mg/dL    BUN 49 (H) 6 - 23 mg/dL    CREATININE 1.2 (H) 0.5 - 1.0 mg/dL    GFR Non-African American 45 >=60 mL/min/1.73    GFR African American 54     Calcium 8.3 (L) 8.6 - 10.2 mg/dL    Total Protein 5.7 (L) 6.4 - 8.3 g/dL    Albumin 2.5 (L) 3.5 - 5.2 g/dL    Total Bilirubin 0.5 0.0 - 1.2 mg/dL    Alkaline Phosphatase 107 (H) 35 - 104 U/L    ALT 12 0 - 32 U/L    AST 17 0 - 31 U/L   CBC auto differential   Result Value Ref Range    WBC 11.1 4.5 - 11.5 E9/L    RBC 3.29 (L) 3.50 - 5.50 E12/L    Hemoglobin 8.1 (L) 11.5 - 15.5 g/dL    Hematocrit 26.1 (L) 34.0 - 48.0 %    MCV 79.3 (L) 80.0 - 99.9 fL    MCH 24.6 (L) 26.0 - 35.0 pg    MCHC 31.0 (L) 32.0 - 34.5 %    RDW 17.2 (H) 11.5 - 15.0 fL    Platelets 362 183 - 210 E9/L    MPV 10.3 7.0 - 12.0 fL    Neutrophils % 91.2 (H) 43.0 - 80.0 %    Lymphocytes % 7.0 (L) 20.0 - 42.0 %    Monocytes % 0.9 (L) 2.0 - 12.0 %    Eosinophils % 0.5 0.0 - 6.0 %    Basophils % 0.2 0.0 - 2.0 %    Neutrophils Absolute 10.21 (H) 1.80 - 7.30 E9/L    Lymphocytes Absolute 0.78 (L) 1.50 - 4.00 E9/L    Monocytes Absolute 0.11 0.10 - 0.95 E9/L    Eosinophils Absolute 0.00 (L) 0.05 - 0.50 E9/L    Basophils Absolute 0.00 0.00 - 0.20 E9/L    Metamyelocytes Relative 0.9 0.0 - 1.0 %    nRBC 1.8 /100 WBC    Anisocytosis 1+     Polychromasia 2+     Hypochromia 1+    Comprehensive Metabolic Panel w/ Reflex to MG   Result Value Ref Range    Sodium 126 (L) 132 - 146 mmol/L    Potassium reflex Magnesium 4.6 3.5 - 5.0 mmol/L    Chloride 91 (L) 98 - 107 mmol/L    CO2 24 22 - 29 mmol/L    Anion Gap 11 7 - 16 mmol/L    Glucose 375 (H) 74 - 99 mg/dL    BUN 61 (H) 6 - 23 mg/dL    CREATININE 1.4 (H) 0.5 - 1.0 mg/dL    GFR Non-African American 37 >=60 mL/min/1.73    GFR African American 45     Calcium 8.4 (L) 8.6 - 10.2 mg/dL    Total Protein 5.8 (L) 6.4 - 8.3 g/dL    Albumin 2.3 (L) 3.5 - 5.2 g/dL    Total Bilirubin 0.4 0.0 - 1.2 mg/dL    Alkaline Phosphatase 150 (H) 35 - 104 U/L    ALT 30 0 - 32 U/L    AST 41 (H) 0 - 31 U/L   CBC auto differential   Result Value Ref Range    WBC 9.1 4.5 - 11.5 E9/L    RBC 3.62 3.50 - 5.50 E12/L    Hemoglobin 8.9 (L) 11.5 - 15.5 g/dL    Hematocrit 28.7 (L) 34.0 - 48.0 %    MCV 79.3 (L) 80.0 - 99.9 fL    MCH 24.6 (L) 26.0 - 35.0 pg    MCHC 31.0 (L) 32.0 - 34.5 %    RDW 17.2 (H) 11.5 - 15.0 fL    Platelets 726 866 - 671 E9/L    MPV 11.0 7.0 - 12.0 fL    Neutrophils % 69.6 43.0 - 80.0 %    Lymphocytes % 15.7 (L) 20.0 - 42.0 %    Monocytes % 11.3 2.0 - 12.0 %    Eosinophils % 1.0 0.0 - 6.0 %    Basophils % 0.3 0.0 - 2.0 %    Neutrophils Absolute 6.46 1.80 - 7.30 E9/L    Lymphocytes Absolute 1.46 (L) 1.50 - 4.00 E9/L    Monocytes Absolute 1.00 (H) 0.10 - 0.95 E9/L    Eosinophils Absolute 0.00 (L) 0.05 - 0.50 E9/L    Basophils Absolute 0.00 0.00 - 0.20 E9/L    Myelocyte Percent 0.9 0 - 0 %    Promyelocytes Percent 2.6 0 - 0 %    nRBC 1.7 /100 WBC    Anisocytosis 1+     Polychromasia 2+     Poikilocytes 1+     Ovalocytes 1+    COMPREHENSIVE METABOLIC PANEL   Result Value Ref Range    Potassium 4.6 3.5 - 5.0 mmol/L   POCT Glucose   Result Value Ref Range    Meter Glucose 289 (H) 74 - 99 mg/dL   POCT Glucose   Result Value Ref Range    Meter Glucose 288 (H) 74 - 99 mg/dL   POCT Glucose   Result Value Ref Range    Meter Glucose 298 (H) 74 - 99 mg/dL   POCT Glucose   Result Value Ref Range    Meter Glucose 283 (H) 74 - 99 mg/dL   POCT Glucose Result Value Ref Range    Meter Glucose 309 (H) 74 - 99 mg/dL   POCT Glucose   Result Value Ref Range    Meter Glucose 344 (H) 74 - 99 mg/dL   POCT Glucose   Result Value Ref Range    Meter Glucose 348 (H) 74 - 99 mg/dL   POCT Glucose   Result Value Ref Range    Meter Glucose 338 (H) 74 - 99 mg/dL   POCT Glucose   Result Value Ref Range    Meter Glucose 357 (H) 74 - 99 mg/dL   POCT Glucose   Result Value Ref Range    Meter Glucose 381 (H) 74 - 99 mg/dL   EKG 12 Lead   Result Value Ref Range    Ventricular Rate 130 BPM    Atrial Rate 125 BPM    QRS Duration 98 ms    Q-T Interval 334 ms    QTc Calculation (Bazett) 491 ms    R Axis 17 degrees    T Axis 30 degrees   EKG 12 Lead   Result Value Ref Range    Ventricular Rate 110 BPM    Atrial Rate 133 BPM    QRS Duration 120 ms    Q-T Interval 322 ms    QTc Calculation (Bazett) 435 ms    R Axis 58 degrees    T Axis 36 degrees       RADIOLOGY:  XR CHEST PORTABLE   Final Result   No acute cardiopulmonary process. CT ABDOMEN PELVIS WO CONTRAST Additional Contrast? None   Final Result   Focal 4.4 cm right adrenal gland lesion is again demonstrated. This is   increased in size when compared to June 2006. Surgical consultation   recommended if patient has not had prior surgical workup. Nonspecific mild prominence of retroperitoneal lymphadenopathy adjacent to   the left common iliac artery. MRI ABDOMEN W WO CONTRAST    (Results Pending)       EKG: This EKG is signed and interpreted by me. Rate: 125  Rhythm: Atrial fibrillation  Interpretation: atrial fibrillation (chronic)  Comparison: changes compared to previous EKG    ------------------------- NURSING NOTES AND VITALS REVIEWED ---------------------------  Date / Time Roomed:  8/18/2021 12:28 PM  ED Bed Assignment:  [id number]    The nursing notes within the ED encounter and vital signs as below have been reviewed.      Patient Vitals for the past 24 hrs:   BP Temp Temp src Pulse Resp SpO2 08/21/21 1212 -- -- -- -- 18 97 %   08/21/21 0834 -- -- -- -- -- 97 %   08/21/21 0745 121/62 97 °F (36.1 °C) Temporal 75 18 98 %   08/20/21 2119 121/76 98 °F (36.7 °C) Temporal 78 16 98 %   08/20/21 2034 -- -- -- -- 16 96 %   08/20/21 1605 -- -- -- -- 16 96 %       Oxygen Saturation Interpretation: Normal    ------------------------------------------ PROGRESS NOTES ------------------------------------------  Re-evaluation(s):  Patients symptoms are improving  Repeat physical examination is improved    Counseling:  I have spoken with the patient and discussed todays results, in addition to providing specific details for the plan of care and counseling regarding the diagnosis and prognosis. Their questions are answered at this time and they are agreeable with the plan of admission.    --------------------------------- ADDITIONAL PROVIDER NOTES ---------------------------------  Consultations:  Spoke with hospitalist.  Discussed case. They will admit the patient. This patient's ED course included: a personal history and physicial examination, multiple bedside re-evaluations, IV medications, cardiac monitoring and continuous pulse oximetry    This patient has remained hemodynamically stable during their ED course. Diagnosis:  1. Sepsis without acute organ dysfunction, due to unspecified organism ([address]    2. Renal lesion    3. Lymphedema    4. Chronic atrial fibrillation with RVR (HCC)      Disposition:  Patient's disposition: Admit to telemetry  Patient's condition is stable. 8/21/21, 3:38 PM EDT.     This note is prepared by Juan Manuel Barrera MD -PGY- 2             Juan Manuel Barrera MD  Resident  08/21/21 [id number]

## 2021-08-18 NOTE — ED NOTES
Bed: 24  Expected date:   Expected time:   Means of arrival:   Comments:  triage     Fernanda Barakat RN  08/18/21 [id number]

## 2021-08-19 LAB
BOTTLE TYPE: NORMAL
CANDIDA ALBICANS BY PCR: NOT DETECTED
CANDIDA GLABRATA BY PCR: NOT DETECTED
CANDIDA KRUSEI BY PCR: NOT DETECTED
CANDIDA PARAPSILOSIS BY PCR: NOT DETECTED
CANDIDA TROPICALIS BY PCR: NOT DETECTED
EKG ATRIAL RATE: 133 BPM
EKG Q-T INTERVAL: 322 MS
EKG QRS DURATION: 120 MS
EKG QTC CALCULATION (BAZETT): 435 MS
EKG R AXIS: 58 DEGREES
EKG T AXIS: 36 DEGREES
EKG VENTRICULAR RATE: 110 BPM
ENTEROBACTER CLOACAE COMPLEX BY PCR: NOT DETECTED
ENTEROBACTERALES BY PCR: NOT DETECTED
ESCHERICHIA COLI BY PCR: NOT DETECTED
HAEMOPHILUS INFLUENZAE BY PCR: NOT DETECTED
KLEBSIELLA OXYTOCA BY PCR: NOT DETECTED
KLEBSIELLA PNEUMONIAE GROUP BY PCR: NOT DETECTED
LISTERIA MONOCYTOGENES BY PCR: NOT DETECTED
METER GLUCOSE: 283 MG/DL (ref 74–99)
METER GLUCOSE: 288 MG/DL (ref 74–99)
METER GLUCOSE: 289 MG/DL (ref 74–99)
METER GLUCOSE: 298 MG/DL (ref 74–99)
NEISSERIA MENINGITIDIS BY PCR: NOT DETECTED
ORDER NUMBER: NORMAL
PROTEUS SPECIES BY PCR: NOT DETECTED
PSEUDOMONAS AERUGINOSA BY PCR: NOT DETECTED
SERRATIA MARCESCENS BY PCR: NOT DETECTED
SOURCE OF BLOOD CULTURE: NORMAL
STAPHYLOCOCCUS AUREUS BY PCR: NOT DETECTED
STAPHYLOCOCCUS SPECIES BY PCR: NOT DETECTED
STREPTOCOCCUS AGALACTIAE BY PCR: NOT DETECTED
STREPTOCOCCUS PNEUMONIAE BY PCR: NOT DETECTED
STREPTOCOCCUS PYOGENES  BY PCR: NOT DETECTED
STREPTOCOCCUS SPECIES BY PCR: NOT DETECTED
TSH SERPL DL<=0.05 MIU/L-ACNC: 1.89 UIU/ML (ref 0.27–4.2)

## 2021-08-19 PROCEDURE — 93010 ELECTROCARDIOGRAM REPORT: CPT | Performed by: INTERNAL MEDICINE

## 2021-08-19 PROCEDURE — 2060000000 HC ICU INTERMEDIATE R&B

## 2021-08-19 PROCEDURE — 2580000003 HC RX 258: Performed by: INTERNAL MEDICINE

## 2021-08-19 PROCEDURE — 99223 1ST HOSP IP/OBS HIGH 75: CPT | Performed by: INTERNAL MEDICINE

## 2021-08-19 PROCEDURE — APPSS60 APP SPLIT SHARED TIME 46-60 MINUTES: Performed by: PHYSICIAN ASSISTANT

## 2021-08-19 PROCEDURE — 2580000003 HC RX 258: Performed by: FAMILY MEDICINE

## 2021-08-19 PROCEDURE — 2700000000 HC OXYGEN THERAPY PER DAY

## 2021-08-19 PROCEDURE — 6370000000 HC RX 637 (ALT 250 FOR IP): Performed by: FAMILY MEDICINE

## 2021-08-19 PROCEDURE — 6370000000 HC RX 637 (ALT 250 FOR IP): Performed by: INTERNAL MEDICINE

## 2021-08-19 PROCEDURE — 82962 GLUCOSE BLOOD TEST: CPT

## 2021-08-19 PROCEDURE — 6360000002 HC RX W HCPCS: Performed by: EMERGENCY MEDICINE

## 2021-08-19 PROCEDURE — 94640 AIRWAY INHALATION TREATMENT: CPT

## 2021-08-19 PROCEDURE — 6360000002 HC RX W HCPCS: Performed by: INTERNAL MEDICINE

## 2021-08-19 PROCEDURE — 93005 ELECTROCARDIOGRAM TRACING: CPT | Performed by: PHYSICIAN ASSISTANT

## 2021-08-19 PROCEDURE — 94664 DEMO&/EVAL PT USE INHALER: CPT

## 2021-08-19 RX ORDER — BUMETANIDE 1 MG/1
2 TABLET ORAL 2 TIMES DAILY
Status: DISCONTINUED | OUTPATIENT
Start: 2021-08-19 | End: 2021-08-23 | Stop reason: HOSPADM

## 2021-08-19 RX ORDER — INSULIN GLARGINE 100 [IU]/ML
0.25 INJECTION, SOLUTION SUBCUTANEOUS NIGHTLY
Status: DISCONTINUED | OUTPATIENT
Start: 2021-08-19 | End: 2021-08-21

## 2021-08-19 RX ORDER — METOPROLOL SUCCINATE 100 MG/1
100 TABLET, EXTENDED RELEASE ORAL 2 TIMES DAILY
Status: DISCONTINUED | OUTPATIENT
Start: 2021-08-19 | End: 2021-08-23 | Stop reason: HOSPADM

## 2021-08-19 RX ORDER — NICOTINE POLACRILEX 4 MG
15 LOZENGE BUCCAL PRN
Status: DISCONTINUED | OUTPATIENT
Start: 2021-08-19 | End: 2021-08-23 | Stop reason: HOSPADM

## 2021-08-19 RX ORDER — FERROUS SULFATE 325(65) MG
325 TABLET ORAL
Status: DISCONTINUED | OUTPATIENT
Start: 2021-08-19 | End: 2021-08-23 | Stop reason: HOSPADM

## 2021-08-19 RX ORDER — POLYETHYLENE GLYCOL 3350 17 G/17G
17 POWDER, FOR SOLUTION ORAL DAILY PRN
Status: DISCONTINUED | OUTPATIENT
Start: 2021-08-19 | End: 2021-08-23 | Stop reason: HOSPADM

## 2021-08-19 RX ORDER — TRAZODONE HYDROCHLORIDE 50 MG/1
50 TABLET ORAL NIGHTLY
Status: DISCONTINUED | OUTPATIENT
Start: 2021-08-19 | End: 2021-08-23 | Stop reason: HOSPADM

## 2021-08-19 RX ORDER — GABAPENTIN 300 MG/1
600 CAPSULE ORAL 3 TIMES DAILY
Status: DISCONTINUED | OUTPATIENT
Start: 2021-08-19 | End: 2021-08-23 | Stop reason: HOSPADM

## 2021-08-19 RX ORDER — SODIUM CHLORIDE 0.9 % (FLUSH) 0.9 %
10 SYRINGE (ML) INJECTION EVERY 12 HOURS SCHEDULED
Status: DISCONTINUED | OUTPATIENT
Start: 2021-08-19 | End: 2021-08-21 | Stop reason: SDUPTHER

## 2021-08-19 RX ORDER — DILTIAZEM HYDROCHLORIDE 120 MG/1
240 CAPSULE, COATED, EXTENDED RELEASE ORAL 2 TIMES DAILY
Status: DISCONTINUED | OUTPATIENT
Start: 2021-08-19 | End: 2021-08-19 | Stop reason: SDUPTHER

## 2021-08-19 RX ORDER — IPRATROPIUM BROMIDE AND ALBUTEROL SULFATE 2.5; .5 MG/3ML; MG/3ML
1 SOLUTION RESPIRATORY (INHALATION)
Status: DISCONTINUED | OUTPATIENT
Start: 2021-08-19 | End: 2021-08-23 | Stop reason: HOSPADM

## 2021-08-19 RX ORDER — ACETAMINOPHEN 325 MG/1
650 TABLET ORAL EVERY 6 HOURS PRN
Status: DISCONTINUED | OUTPATIENT
Start: 2021-08-19 | End: 2021-08-23 | Stop reason: HOSPADM

## 2021-08-19 RX ORDER — SODIUM CHLORIDE 9 MG/ML
25 INJECTION, SOLUTION INTRAVENOUS PRN
Status: DISCONTINUED | OUTPATIENT
Start: 2021-08-19 | End: 2021-08-21 | Stop reason: SDUPTHER

## 2021-08-19 RX ORDER — DEXTROSE MONOHYDRATE 25 G/50ML
12.5 INJECTION, SOLUTION INTRAVENOUS PRN
Status: DISCONTINUED | OUTPATIENT
Start: 2021-08-19 | End: 2021-08-23 | Stop reason: HOSPADM

## 2021-08-19 RX ORDER — SPIRONOLACTONE 25 MG/1
25 TABLET ORAL DAILY
Status: DISCONTINUED | OUTPATIENT
Start: 2021-08-19 | End: 2021-08-23 | Stop reason: HOSPADM

## 2021-08-19 RX ORDER — DILTIAZEM HYDROCHLORIDE 120 MG/1
240 CAPSULE, COATED, EXTENDED RELEASE ORAL 2 TIMES DAILY
Status: DISCONTINUED | OUTPATIENT
Start: 2021-08-19 | End: 2021-08-23 | Stop reason: HOSPADM

## 2021-08-19 RX ORDER — ALBUTEROL SULFATE 2.5 MG/3ML
2.5 SOLUTION RESPIRATORY (INHALATION) ONCE
Status: COMPLETED | OUTPATIENT
Start: 2021-08-19 | End: 2021-08-19

## 2021-08-19 RX ORDER — ESCITALOPRAM OXALATE 10 MG/1
20 TABLET ORAL DAILY
Status: DISCONTINUED | OUTPATIENT
Start: 2021-08-19 | End: 2021-08-23 | Stop reason: HOSPADM

## 2021-08-19 RX ORDER — SODIUM CHLORIDE 0.9 % (FLUSH) 0.9 %
10 SYRINGE (ML) INJECTION PRN
Status: DISCONTINUED | OUTPATIENT
Start: 2021-08-19 | End: 2021-08-21 | Stop reason: SDUPTHER

## 2021-08-19 RX ORDER — PANTOPRAZOLE SODIUM 40 MG/1
40 TABLET, DELAYED RELEASE ORAL
Status: DISCONTINUED | OUTPATIENT
Start: 2021-08-19 | End: 2021-08-20

## 2021-08-19 RX ORDER — SODIUM CHLORIDE 9 MG/ML
INJECTION, SOLUTION INTRAVENOUS CONTINUOUS
Status: DISCONTINUED | OUTPATIENT
Start: 2021-08-19 | End: 2021-08-23 | Stop reason: HOSPADM

## 2021-08-19 RX ORDER — ONDANSETRON 2 MG/ML
4 INJECTION INTRAMUSCULAR; INTRAVENOUS EVERY 6 HOURS PRN
Status: DISCONTINUED | OUTPATIENT
Start: 2021-08-19 | End: 2021-08-23 | Stop reason: HOSPADM

## 2021-08-19 RX ORDER — PROMETHAZINE HYDROCHLORIDE 25 MG/1
12.5 TABLET ORAL EVERY 6 HOURS PRN
Status: DISCONTINUED | OUTPATIENT
Start: 2021-08-19 | End: 2021-08-23 | Stop reason: HOSPADM

## 2021-08-19 RX ORDER — ERGOCALCIFEROL 1.25 MG/1
50000 CAPSULE ORAL WEEKLY
Status: DISCONTINUED | OUTPATIENT
Start: 2021-08-23 | End: 2021-08-23 | Stop reason: HOSPADM

## 2021-08-19 RX ORDER — EZETIMIBE 10 MG/1
10 TABLET ORAL NIGHTLY
Status: DISCONTINUED | OUTPATIENT
Start: 2021-08-19 | End: 2021-08-23 | Stop reason: HOSPADM

## 2021-08-19 RX ORDER — ACETAMINOPHEN 650 MG/1
650 SUPPOSITORY RECTAL EVERY 6 HOURS PRN
Status: DISCONTINUED | OUTPATIENT
Start: 2021-08-19 | End: 2021-08-23 | Stop reason: HOSPADM

## 2021-08-19 RX ORDER — DEXTROSE MONOHYDRATE 50 MG/ML
100 INJECTION, SOLUTION INTRAVENOUS PRN
Status: DISCONTINUED | OUTPATIENT
Start: 2021-08-19 | End: 2021-08-23 | Stop reason: HOSPADM

## 2021-08-19 RX ADMIN — APIXABAN 5 MG: 5 TABLET, FILM COATED ORAL at 21:33

## 2021-08-19 RX ADMIN — METOPROLOL SUCCINATE 100 MG: 100 TABLET, EXTENDED RELEASE ORAL at 02:39

## 2021-08-19 RX ADMIN — TRAZODONE HYDROCHLORIDE 50 MG: 50 TABLET ORAL at 21:33

## 2021-08-19 RX ADMIN — INSULIN GLARGINE 30 UNITS: 100 INJECTION, SOLUTION SUBCUTANEOUS at 21:34

## 2021-08-19 RX ADMIN — SPIRONOLACTONE 25 MG: 25 TABLET ORAL at 09:31

## 2021-08-19 RX ADMIN — METOPROLOL SUCCINATE 100 MG: 100 TABLET, EXTENDED RELEASE ORAL at 21:33

## 2021-08-19 RX ADMIN — BUMETANIDE 2 MG: 1 TABLET ORAL at 02:39

## 2021-08-19 RX ADMIN — ESCITALOPRAM 20 MG: 10 TABLET, FILM COATED ORAL at 09:30

## 2021-08-19 RX ADMIN — IPRATROPIUM BROMIDE AND ALBUTEROL SULFATE 1 AMPULE: .5; 2.5 SOLUTION RESPIRATORY (INHALATION) at 20:13

## 2021-08-19 RX ADMIN — Medication 10 ML: at 21:38

## 2021-08-19 RX ADMIN — ACETAMINOPHEN 650 MG: 325 TABLET ORAL at 21:45

## 2021-08-19 RX ADMIN — INSULIN LISPRO 3 UNITS: 100 INJECTION, SOLUTION INTRAVENOUS; SUBCUTANEOUS at 21:34

## 2021-08-19 RX ADMIN — GABAPENTIN 600 MG: 300 CAPSULE ORAL at 09:36

## 2021-08-19 RX ADMIN — DILTIAZEM HYDROCHLORIDE 240 MG: 120 CAPSULE, COATED, EXTENDED RELEASE ORAL at 02:39

## 2021-08-19 RX ADMIN — ALBUTEROL SULFATE 2.5 MG: 2.5 SOLUTION RESPIRATORY (INHALATION) at 02:11

## 2021-08-19 RX ADMIN — GABAPENTIN 600 MG: 300 CAPSULE ORAL at 21:33

## 2021-08-19 RX ADMIN — PANTOPRAZOLE SODIUM 40 MG: 40 TABLET, DELAYED RELEASE ORAL at 06:26

## 2021-08-19 RX ADMIN — FERROUS SULFATE TAB 325 MG (65 MG ELEMENTAL FE) 325 MG: 325 (65 FE) TAB at 09:36

## 2021-08-19 RX ADMIN — ACETAMINOPHEN 650 MG: 325 TABLET ORAL at 01:50

## 2021-08-19 RX ADMIN — METOPROLOL SUCCINATE 100 MG: 100 TABLET, EXTENDED RELEASE ORAL at 09:31

## 2021-08-19 RX ADMIN — INSULIN LISPRO 6 UNITS: 100 INJECTION, SOLUTION INTRAVENOUS; SUBCUTANEOUS at 06:26

## 2021-08-19 RX ADMIN — INSULIN LISPRO 6 UNITS: 100 INJECTION, SOLUTION INTRAVENOUS; SUBCUTANEOUS at 17:26

## 2021-08-19 RX ADMIN — MEROPENEM 1000 MG: 1 INJECTION, POWDER, FOR SOLUTION INTRAVENOUS at 12:54

## 2021-08-19 RX ADMIN — SODIUM CHLORIDE: 9 INJECTION, SOLUTION INTRAVENOUS at 02:39

## 2021-08-19 RX ADMIN — ACETAMINOPHEN 650 MG: 325 TABLET ORAL at 15:22

## 2021-08-19 RX ADMIN — IPRATROPIUM BROMIDE AND ALBUTEROL SULFATE 1 AMPULE: .5; 2.5 SOLUTION RESPIRATORY (INHALATION) at 14:51

## 2021-08-19 RX ADMIN — BUMETANIDE 2 MG: 1 TABLET ORAL at 09:31

## 2021-08-19 RX ADMIN — EZETIMIBE 10 MG: 10 TABLET ORAL at 21:33

## 2021-08-19 RX ADMIN — APIXABAN 5 MG: 5 TABLET, FILM COATED ORAL at 09:31

## 2021-08-19 RX ADMIN — INSULIN LISPRO 6 UNITS: 100 INJECTION, SOLUTION INTRAVENOUS; SUBCUTANEOUS at 12:41

## 2021-08-19 RX ADMIN — MEROPENEM 1000 MG: 1 INJECTION, POWDER, FOR SOLUTION INTRAVENOUS at 18:27

## 2021-08-19 RX ADMIN — DILTIAZEM HYDROCHLORIDE 240 MG: 120 CAPSULE, COATED, EXTENDED RELEASE ORAL at 09:31

## 2021-08-19 RX ADMIN — GABAPENTIN 600 MG: 300 CAPSULE ORAL at 14:30

## 2021-08-19 RX ADMIN — DILTIAZEM HYDROCHLORIDE 240 MG: 120 CAPSULE, COATED, EXTENDED RELEASE ORAL at 21:33

## 2021-08-19 RX ADMIN — ACETAMINOPHEN 650 MG: 325 TABLET ORAL at 09:31

## 2021-08-19 RX ADMIN — APIXABAN 5 MG: 5 TABLET, FILM COATED ORAL at 02:39

## 2021-08-19 ASSESSMENT — PAIN DESCRIPTION - PAIN TYPE: TYPE: CHRONIC PAIN

## 2021-08-19 ASSESSMENT — PAIN SCALES - GENERAL
PAINLEVEL_OUTOF10: 7
PAINLEVEL_OUTOF10: 8
PAINLEVEL_OUTOF10: 8
PAINLEVEL_OUTOF10: 5
PAINLEVEL_OUTOF10: 8

## 2021-08-19 ASSESSMENT — PAIN DESCRIPTION - LOCATION: LOCATION: BACK;LEG

## 2021-08-19 ASSESSMENT — PAIN DESCRIPTION - ORIENTATION: ORIENTATION: LEFT;LOWER

## 2021-08-19 ASSESSMENT — PAIN DESCRIPTION - DESCRIPTORS: DESCRIPTORS: SHARP;ACHING;DISCOMFORT

## 2021-08-19 ASSESSMENT — PAIN SCALES - WONG BAKER: WONGBAKER_NUMERICALRESPONSE: 0

## 2021-08-19 NOTE — CARE COORDINATION
Met with pt at bedside with c/o difficulty breathing; notified bedside RN Cruzito Burgos and attending;  Duoneb ordered; notified Steve Negrete at  with respiratory of the aforementioned. Pt reports from home however, should she need SNF had recent stay at [facility] and would return if needed. Therapy orders obtained. Will re-visit pt tomorrow as she is quite uncomfortable today and request to be seen tomorrow.

## 2021-08-19 NOTE — H&P
Hospitalist History & Physical      PCP: Nicholas Alonso    Date of Admission: 8/18/2021    Date of Service: Pt seen/examined on 8/18/2021     Chief Complaint:  had concerns including Abdominal Pain (periumbilical pain 2 days ), Nausea (cant keep anything down, 2 days ), and Emesis. History Of Present Illness:    Ms. Herminia Camara, a 77y.o. year old female  who  has a past medical history of (HFpEF) heart failure with preserved ejection fraction ([address] Anal fissure, Anemia, Anxiety and depression, Arthritis, Asthma, Atrial fibrillation ([address] Blood transfusion, CHF (congestive heart failure) ([address] COPD (chronic obstructive pulmonary disease) ([address] Disc disorder, DM2 (diabetes mellitus, type 2) ([address] Fall due to stumbling, GERD (gastroesophageal reflux disease), History of blood transfusion, Hx of blood clots, Hyperlipidemia, Hypertension, Inappropriate sinus tachycardia, LVH (left ventricular hypertrophy), Lymphadenitis, chronic, Occipital neuralgia, Respiratory acidosis, and Sinus tachycardia. Patient presents emergency department planes of abdominal pain, nausea and vomiting. Patient has a history of lymphadenitis with wounds with history of MRSA and ESBL bacteremia. Nominal pain is been going on for 2 days. Unable to keep food or medicines down. On arrival vital signs revealed the patient had a heart rate in the 120s. EKG showed atrial fibrillation with RVR. She is also initially little hypotensive with pressures 91/55. [facility] studies notable for sodium 129, potassium 5.2, creatinine 1.7, BUN 49, lactic acid 2.5, troponin 25, hemoglobin 9.8. Patient has edematous legs with excoriation, scaling, weeping foul-smelling fluid.     Past Medical History:   Diagnosis Date    (HFpEF) heart failure with preserved ejection fraction ([address]     1/16/2018- echo- LVEF 55-65%    Anal fissure     Anemia 10/6/2017    Anxiety and depression     Arthritis     Asthma     Atrial fibrillation ([address] (NEURONTIN) 400 MG capsule Take 600 mg by mouth 3 times daily. Historical Provider, MD   lansoprazole (PREVACID) 30 MG delayed release capsule Take 30 mg by mouth 2 times daily    Historical Provider, MD   traZODone (DESYREL) 50 MG tablet Take 50 mg by mouth nightly    Historical Provider, MD   vitamin D (ERGOCALCIFEROL) 1.25 MG (03816 UT) CAPS capsule Take 50,000 Units by mouth once a week Given Monday    Historical Provider, MD   omega-3 acid ethyl esters (LOVAZA) 1 g capsule Take 2 capsules by mouth 2 times daily 9/1/20   Marcelina Alvarez,    spironolactone (ALDACTONE) 25 MG tablet Take 1 tablet by mouth daily 8/27/20   Marcelina Alvarez DO   ferrous sulfate (IRON 325) 325 (65 Fe) MG tablet Take 1 tablet by mouth daily (with breakfast) 8/25/20   [place] Andrea Alvarez, DO         Allergies:  Statins    Social History:    TOBACCO:   reports that she quit smoking about 16 years ago. Her smoking use included cigarettes. She started smoking about 41 years ago. She has a 37.50 pack-year smoking history. She has never used smokeless tobacco.  ETOH:   reports previous alcohol use. Family History:    Reviewed in detail and negative for DM, CAD, Cancer, CVA. Positive as follows\"      Problem Relation Age of Onset    Heart Disease Mother     Diabetes Father     Colon Cancer Father     Other Father         BLINDNESS    Colon Cancer Sister     Other Sister         shingles- has had over 1 year    Diabetes Paternal Aunt     Diabetes Paternal Uncle     Diabetes Paternal Aunt     Diabetes Paternal Uncle     Diabetes Sister        REVIEW OF SYSTEMS:   Pertinent positives as noted in the HPI. All other systems reviewed and negative. PHYSICAL EXAM:  /65   Pulse 128   Temp 98.8 °F (37.1 °C)   Resp 18   Wt 265 lb (120.2 kg)   LMP  (LMP Unknown)   SpO2 100%   BMI 46.94 kg/m²   General appearance: No apparent distress, appears stated age and cooperative.   HEENT: Normal cephalic, atraumatic without obvious deformity. Pupils equal, round, and reactive to light. Extra ocular muscles intact. Conjunctivae/corneas clear. Neck: Supple, with full range of motion. No jugular venous distention. Trachea midline. Respiratory: CTA  Cardiovascular: RRR  Abdomen: Soft, TTP  Musculoskeletal: No clubbing, cyanosis, edema of bilateral lower extremities. Brisk capillary refill. Skin: Bilateral lower extremity swelling/lymphedema, excoriation, weeping foul-smelling fluid. Neurologic:  Neurovascularly intact without any focal sensory/motor deficits. Cranial nerves: II-XII intact, grossly non-focal.      CBC:   Recent Labs     08/18/21 0952   WBC 8.8   RBC 3.93   HGB 9.8*   HCT 31.2*   MCV 79.4*   RDW 16.9*        BMP:   Recent Labs     08/18/21 0952   *   K 5.2*   CL 89*   CO2 28   BUN 49*   CREATININE 1.7*     LFT:  Recent Labs     08/18/21 0952   PROT 7.0   ALKPHOS 111*   ALT 11   AST 13   BILITOT 0.8   LIPASE 8*     CE:  No results for input(s): CKTOTAL, TROPONINI in the last 72 hours. PT/INR: No results for input(s): INR, APTT in the last 72 hours. BNP: No results for input(s): BNP in the last 72 hours.   ESR:   Lab Results   Component Value Date    SEDRATE 41 (H) 07/05/2021     CRP:   Lab Results   Component Value Date    CRP 5.3 (H) 07/05/2021     D Dimer: No results found for: DDIMER   Folate and B12:   Lab Results   Component Value Date    QLEHTIBS36 397 11/07/2018   ,   Lab Results   Component Value Date    FOLATE 13.0 11/07/2018     Lactic Acid:   Lab Results   Component Value Date    LACTA 1.5 07/05/2021     Thyroid Studies:   Lab Results   Component Value Date    TSH 0.575 01/15/2021       Oupatient labs:  Lab Results   Component Value Date    CHOL 355 (H) 01/16/2021    TRIG 298 (H) 01/16/2021    HDL 52 01/16/2021    LDLCALC 243 (H) 01/16/2021    TSH 0.575 01/15/2021    INR 1.8 04/29/2021    LABA1C 7.9 (H) 07/06/2021       Urinalysis:    Lab Results   Component Value Date    NITRU Negative 08/18/2021    WBCUA NONE 08/18/2021    BACTERIA MANY 08/18/2021    RBCUA 1-3 08/18/2021    BLOODU Negative 08/18/2021    SPECGRAV 1.025 08/18/2021    GLUCOSEU Negative 08/18/2021       Imaging:  CT ABDOMEN PELVIS WO CONTRAST Additional Contrast? None    Result Date: 8/18/2021  EXAMINATION: CT OF THE ABDOMEN AND PELVIS WITHOUT CONTRAST 8/18/2021 11:21 am TECHNIQUE: CT of the abdomen and pelvis was performed without the administration of intravenous contrast. Multiplanar reformatted images are provided for review. Dose modulation, iterative reconstruction, and/or weight based adjustment of the mA/kV was utilized to reduce the radiation dose to as low as reasonably achievable. COMPARISON: None. HISTORY: ORDERING SYSTEM PROVIDED HISTORY: abd pain, emesis, periumbilical TECHNOLOGIST PROVIDED HISTORY: Reason for exam:->abd pain, emesis, periumbilical Additional Contrast?->None Decision Support Exception - unselect if not a suspected or confirmed emergency medical condition->Emergency Medical Condition (MA) What reading provider will be dictating this exam?->CRC FINDINGS: Lower Chest: Bilateral lung bases are clear. Liver: No hepatic lesions identified. Bile ducts: Gallbladder is unremarkable. No evidence of intrahepatic or extrahepatic biliary dilatation. Pancreas: The pancreatic duct is not dilated. Adrenal glands: No left-sided adrenal lesions. There is a 4.4 cm right adrenal gland lesion again demonstrated measuring approximately 33 Hounsfield units. Kidneys: No evidence of hydronephrosis. No evidence of renal or ureteral calculus. Spleen: Normal in appearance. Bowel: No evidence of bowel obstruction. Diverticulosis seen in the sigmoid colon without evidence for diverticulitis. The appendix is not directly visualized, no secondary signs of acute appendicitis. Pelvis: Bladder is decompressed. Mild prominence of retroperitoneal lymphadenopathy adjacent to the left common iliac artery. Lorean Snare   Uterus is normal for patient's age. Peritoneum/Retroperitoneum: No abnormal lymphadenopathy. Bones/Soft Tissues: No aggressive osseous lesions. Focal 4.4 cm right adrenal gland lesion is again demonstrated. This is increased in size when compared to June 2006. Surgical consultation recommended if patient has not had prior surgical workup. Nonspecific mild prominence of retroperitoneal lymphadenopathy adjacent to the left common iliac artery. XR CHEST PORTABLE    Result Date: 8/18/2021  EXAMINATION: ONE XRAY VIEW OF THE CHEST 8/18/2021 1:50 pm COMPARISON: April 29, 2021 HISTORY: ORDERING SYSTEM PROVIDED HISTORY: tachycardia TECHNOLOGIST PROVIDED HISTORY: Reason for exam:->tachycardia What reading provider will be dictating this exam?->CRC FINDINGS: Study is magnified. The heart has borderline size. No infiltrates, consolidates or pleural effusions observed. There is no perihilar vascular congestion. Fair advanced osteoarthritic changes are seen in both glenohumeral joints. No acute cardiopulmonary process. ASSESSMENT:  Possible sepsis  Atrial fibrillation with RVR  Lymphadenitis lower extremities  Hyponatremia  Hyperkalemia  Acute renal failure  Elevated troponin in the setting of YEIMI  Chronic anemia    PLAN:  Admit to medicine  Consult cardiology  Consult infectious disease  Monitor renal function  Recheck lactic acid level  Monitor cultures  IV fluids  Continue home medications        Diet: No diet orders on file  Code Status: Prior  Surrogate decision maker confirmed with patient:   Extended Emergency Contact Information  Primary Emergency Contact: Chris Booth of [address] Home Phone: [phone]  Mobile Phone: [phone]  Relation: Child   needed? No  Secondary Emergency Contact: London Butler  Address: [address] [place] [address] Phone: [phone]  Mobile Phone: [phone]  Relation: Child   needed? No    DVT Prophylaxis: []Lovenox []Heparin []PCD [] [address] []Encouraged ambulation  Disposition: []Med/Surg [] Intermediate [] ICU/CCU  Admit status: [] Observation [] Inpatient     +++++++++++++++++++++++++++++++++++++++++++++++++  Robbin Muse,   [address], [place]  +++++++++++++++++++++++++++++++++++++++++++++++++  NOTE: This report was transcribed using voice recognition software. Every effort was made to ensure accuracy; however, inadvertent computerized transcription errors may be present.

## 2021-08-19 NOTE — PROGRESS NOTES
ID consult placed.   Patient information given to  Ritu Huitron    Added to Jose Martin's list per Ritu Huitron

## 2021-08-19 NOTE — PROGRESS NOTES
Comprehensive Nutrition Assessment    Type and Reason for Visit:  Initial, Positive Nutrition Screen    Nutrition Recommendations/Plan: Continue current diet and monitor. Pt declines ONS wound healing supplement at this time. Nutrition Assessment:  Pt w/ GNR bacteremia and Lt leg cellulitis. Noted YEIMI and N/V PTA. Hx CHF, DM, COPD, chronic LE lymphedemia. Continue current diet and monitor. Malnutrition Assessment:  Malnutrition Status:  No malnutrition    Context:  Acute Illness     Findings of the 6 clinical characteristics of malnutrition:  Energy Intake:  Mild decrease in energy intake (Comment)  Weight Loss:  No significant weight loss     Body Fat Loss:  No significant body fat loss     Muscle Mass Loss:  No significant muscle mass loss    Fluid Accumulation:  Unable to assess     Strength:  Not Performed    Estimated Daily Nutrient Needs:  Energy (kcal):   (REE 1888 X 1.3 SF); Weight Used for Energy Requirements:  Current     Protein (g):  105-115 (2-2.2); Weight Used for Protein Requirements:  Ideal        Fluid (ml/day):  ; Method Used for Fluid Requirements:  1 ml/kcal      Nutrition Related Findings:  A&O X4, I&Os WDL, generalized edema & +4 pitting edema BLE, abd rotund/soft, BS active, Na (129), elevated K+ (5.2)      Wounds:  Pressure Injury       Current Nutrition Therapies:    ADULT DIET;  Regular; 3 carb choices (45 gm/meal)    Anthropometric Measures:  · Height: 5' 3\" (160 cm)  · Current Body Weight: 305 lb (138.3 kg) (8/19 actual)   · Admission Body Weight: 305 lb (138.3 kg) (8/19 actual)    · Usual Body Weight: 296 lb (134.3 kg) (8/14/20 actual)     · Ideal Body Weight: 115 lbs; % Ideal Body Weight 265.2 %   · BMI: 54  BMI Categories: Obese Class 3 (BMI 40.0 or greater)       Nutrition Diagnosis:   · Increased nutrient needs related to increase demand for energy/nutrients as evidenced by wounds    Nutrition Interventions:   Food and/or Nutrient Delivery:  Continue Current Diet  Nutrition Education/Counseling:  Education initiated (discussed ONS/wound healing, pt declines ONS at this time)   Coordination of Nutrition Care:  Continue to monitor while inpatient    Goals:  Pt will consume >75% of meals and supplements. Nutrition Monitoring and Evaluation:  Food/Nutrient Intake Outcomes:  Food and Nutrient Intake  Physical Signs/Symptoms Outcomes:  Biochemical Data, GI Status, Fluid Status or Edema, Nutrition Focused Physical Findings, Skin, Weight     Discharge Planning:     Too soon to determine     Electronically signed by Cedric Chahal RD, LD on 8/19/21 at 4:25 PM EDT    Contact: [phone]

## 2021-08-19 NOTE — PROGRESS NOTES
Cardizem gtt ordered by cardiology, patients HR controlled (80-90s) without the gtt. Cardiology was notified and stated to hold the gtt and start when HR goes above 100. Cardizem PO was discontinued by primary, RN notified cardiology because the gtt was not started. Per Dr Christine Martinez, continue to hold gtt but resume PO medication tonight as previously ordered.

## 2021-08-19 NOTE — CONSULTS
Department of Internal Medicine  Infectious Diseases   Consult Note      Reason for Consult:  GNR bacteremia, left leg cellulitis     Requesting Physician: Dr Lucille Irene:               Pt is known to me . This is a  77 yrs old female with hx of  morbid obesity, CHF, COPD , oxygen dependent 2 L ,ESBL E coli bacteremia  ( 5/2021) ,chronic lymphedema , cellulitis presented to the ER with nausea,vomiting, diarrhea, shortness of breath , left leg redness for 3-4 days . She denied fever and chills, she denied cough , sputum expectoration, chest pain. She denied dysuria . WBC was 8.8 K   Blood cx growing GNR   Started on meropenem         Past Medical History:      Past Medical History:   Diagnosis Date    (HFpEF) heart failure with preserved ejection fraction ([facility] [address]     1/16/2018- echo- LVEF 55-65%    Anal fissure     Anemia 10/6/2017    Anxiety and depression     Arthritis     Asthma     Atrial fibrillation (HCC)     Eliquis    Blood transfusion     long time no reaction    CHF (congestive heart failure) (HCC)     Diastolic    COPD (chronic obstructive pulmonary disease) (HCC)     Disc disorder     DM2 (diabetes mellitus, type 2) ([facility] [address]     on insulin    Fall due to stumbling 10/6/2017    GERD (gastroesophageal reflux disease)     History of blood transfusion     Hx of blood clots     Hyperlipidemia     Hypertension     Inappropriate sinus tachycardia     LVH (left ventricular hypertrophy)     moderate    Lymphadenitis, chronic 4/13/2018    Occipital neuralgia 11/2/2011    Respiratory acidosis 10/7/2017    Sinus tachycardia 2/16/2015       Past Surgical History:      Past Surgical History:   Procedure Laterality Date    ANOSCOPY  10/25/2006    injection of anal sphincter with Botox, Franklyn Ortiz/Timmy, [facility]    ANOSCOPY  4/9/2007    injection of anal sphincter with Botox, Dr. Raina Rodriguez, [address] [place] [address] ANOSCOPY  6/13/2007    injection of anal sphincter with Botox, Drs. Aubrie Preciado [facility]    ANUS SURGERY  4/4/2006    Lateral sphincterotomy for chronic anal fissure, Dr. Ozzy Martinez [facility]  4/18/2012    anal exam and injection of Botox 100 units into anal sphincter, Laila Preciado, [facility]    APPENDECTOMY  1965   [address]    COLONOSCOPY  1/20/2004    cecal polyp, bx (no pathologic changes), Dr. Sebas Padilla, [facility] COLONOSCOPY  8/11/2006    snare polypectomy terminal ileum polyp (granulation tissue polyp) and anal polyp (inflammatory/papilla), Dr. Mayi Mcclendon, [facility] COLONOSCOPY  3/23/2012    bx/cauterization proximal ascending colon polyp, injection of anal sphincter with Botox, Dr. Mayi Mcclendon, [facility], COLON, DIAGNOSTIC      FRACTURE SURGERY      R leg fracture with surgery for repair    JOINT REPLACEMENT  2003    L knee    OK DEBRIDEMENT, SKIN, SUB-Q TISSUE,MUSCLE,=<20 SQ CM Left 11/30/2018    LEFT LEG EXCISIONAL  DEBRIDEMENT WITH TISSUE BIOPSY performed by Pito Mancini DPM at [address] [facility] ENDOSCOPY  1/20/2004    gastritis, bx (no H pylori), Dr. Sebas Padilla, [address] ENDOSCOPY N/A 6/1/2018    EGD BIOPSY performed by Darell Richmond MD at [facility] N/A 11/9/2018    EGD BIOPSY performed by Darell Richmond MD at [facility] ENDOSCOPY         Current Medications:      Current Facility-Administered Medications   Medication Dose Route Frequency Provider Last Rate Last Admin    apixaban (ELIQUIS) tablet 5 mg  5 mg Oral BID Lili Calero, DO   5 mg at 08/19/21 0931    bumetanide (BUMEX) tablet 2 mg  2 mg Oral BID Lili Calero, DO   2 mg at 08/19/21 0931    dilTIAZem (CARDIZEM CD) extended release capsule 240 mg  240 mg Oral BID Lili Calero, DO   240 mg at 08/19/21 0931    escitalopram (LEXAPRO) tablet 20 mg  20 mg Oral Daily Lili Calero, DO   20 mg at 08/19/21 0930    ezetimibe (ZETIA) tablet 10 mg  10 mg Oral Nightly Rula Ham, DO        ferrous sulfate (IRON 325) tablet 325 mg  325 mg Oral Daily with breakfast Rula Ham, DO   325 mg at 08/19/21 0936    gabapentin (NEURONTIN) capsule 600 mg  600 mg Oral TID Rula Ham, DO   600 mg at 08/19/21 1430    pantoprazole (PROTONIX) tablet 40 mg  40 mg Oral QAM AC Rula Ham, DO   40 mg at 08/19/21 3816    metoprolol succinate (TOPROL XL) extended release tablet 100 mg  100 mg Oral BID Rula Ham, DO   100 mg at 08/19/21 0847    spironolactone (ALDACTONE) tablet 25 mg  25 mg Oral Daily Rula Ham, DO   25 mg at 08/19/21 0931    traZODone (DESYREL) tablet 50 mg  50 mg Oral Nightly Rula Ham, DO        [START ON 8/23/2021] vitamin D (ERGOCALCIFEROL) capsule 50,000 Units  50,000 Units Oral Weekly Rula Ham, DO        sodium chloride flush 0.9 % injection 10 mL  10 mL Intravenous 2 times per day Rula Ham, DO        sodium chloride flush 0.9 % injection 10 mL  10 mL Intravenous PRN Rula Ham, DO        0.9 % sodium chloride infusion  25 mL Intravenous PRN Rula Ham, DO        promethazine (PHENERGAN) tablet 12.5 mg  12.5 mg Oral Q6H PRN Rula Ham, DO        Or    ondansetron [facility]) injection 4 mg  4 mg Intravenous Q6H PRN Rula Ham, DO        polyethylene glycol (GLYCOLAX) packet 17 g  17 g Oral Daily PRN Rula Ham, DO        acetaminophen (TYLENOL) tablet 650 mg  650 mg Oral Q6H PRN Rula Ham, DO   650 mg at 08/19/21 0931    Or    acetaminophen (TYLENOL) suppository 650 mg  650 mg Rectal Q6H PRN Rula Ham, DO        0.9 % sodium chloride infusion   Intravenous Continuous Rula Ham, DO 75 mL/hr at 08/19/21 0239 New Bag at 08/19/21 0239    glucose (GLUTOSE) 40 % oral gel 15 g  15 g Oral PRN Rula Ham, DO        dextrose 50 % IV solution  12.5 g Intravenous PRN Rula Ham, DO        glucagon (rDNA) injection 1 mg  1 mg Intramuscular PRN Rula Ham, DO       Koko Loser dextrose 5 % solution  100 mL/hr Intravenous PRN Girma Conklin, DO        insulin glargine (LANTUS) injection vial 30 Units  0.25 Units/kg Subcutaneous Nightly Girma Conklin, DO        insulin lispro (HUMALOG) injection vial 0-12 Units  0-12 Units Subcutaneous TID WC Girma Conklin, DO   6 Units at 21 1241    insulin lispro (HUMALOG) injection vial 0-6 Units  0-6 Units Subcutaneous Nightly Girma Conklin, DO        meropenem (MERREM) 1,000 mg in sodium chloride 0.9 % 100 mL IVPB (mini-bag)  1,000 mg Intravenous Q8H Marquis Philippe MD 33.3 mL/hr at 21 1254 1,000 mg at 21 1254    Post meropenem saline flush  33 mL Intravenous Q8H Marquis Philippe MD        ipratropium-albuterol (DUONEB) nebulizer solution 1 ampule  1 ampule Inhalation Q4H ALEE Comer MD   1 ampule at 21 1451       Allergies:  Statins    Social History:      Social History     Socioeconomic History    Marital status:      Spouse name: Not on file    Number of children: 3    Years of education: 9    Highest education level: 9th grade   Occupational History    Occupation: SSD   Tobacco Use    Smoking status: Former Smoker     Packs/day: 1.50     Years: 25.00     Pack years: 37.50     Types: Cigarettes     Start date: 1979     Quit date: 2004     Years since quittin.6    Smokeless tobacco: Never Used    Tobacco comment: Stopped smoking 16 years ago   Vaping Use    Vaping Use: Never used   Substance and Sexual Activity    Alcohol use: Not Currently     Comment: 1 can coke daily    Drug use: Not Currently    Sexual activity: Not Currently     Partners: Male   Other Topics Concern    Not on file   Social History Narrative    Denies caffeine. Grandson shops for her and helps around the house    Patient unable to exercise d/t mobility status     Patient lives with her  who has limited speech ability d/t hx of CVA.  He is able to assist patient with IADLS     Social Determinants of Health     Financial Resource Strain:     Difficulty of Paying Living Expenses:    Food Insecurity:     Worried About Running Out of Food in the Last Year:     [address] in the Last Year:    Transportation Needs:     Lack of Transportation (Medical):  Lack of Transportation (Non-Medical):    Physical Activity:     Days of Exercise per Week:     Minutes of Exercise per Session:    Stress:     Feeling of Stress :    Social Connections:     Frequency of Communication with Friends and Family:     Frequency of Social Gatherings with Friends and Family:     Attends [employer] Services:     Active Member of Clubs or Organizations:     Attends Club or Organization Meetings:     Marital Status:    Intimate Partner Violence:     Fear of Current or Ex-Partner:     Emotionally Abused:     Physically Abused:     Sexually Abused:          Family History:     Family History   Problem Relation Age of Onset    Heart Disease Mother     Diabetes Father    [place] Father     Other Father         Lova Lulas Sister     Other Sister         shingles- has had over 1 year    Diabetes Paternal Aunt     Diabetes Paternal Uncle     Diabetes Paternal Aunt     Diabetes Paternal Uncle     Diabetes Sister        REVIEW OF SYSTEMS:    CONSTITUTIONAL:  Denies fever or chills   HEENT: denies blurring of vision or double vision, denies hearing problem  RESPIRATORY: SOB .   CARDIOVASCULAR:  Denies palpitation  GASTROINTESTINAL: Nausea, vomiting, diarrhea   GENITOURINARY:  Denies burning urination or frequency of urination  INTEGUMENT: Left leg redness   HEMATOLOGIC/LYMPHATIC:  Denies lymph node swelling   MUSCULOSKELETAL: Bilateral leg swelling, left leg erythema   NEUROLOGICAL:  Weakness     PHYSICAL EXAM:      Vitals:     BP (!) 111/53   Pulse 103   Temp 97.2 °F (36.2 °C) (Temporal)   Resp 18   Ht 5' 3\" (1.6 m)   Wt (!) 305 lb 5.4 oz (138.5 kg)   LMP  (LMP Unknown)   SpO2 95%   BMI 08/18/2021    LABALBU 4.5 11/02/2011       PT/INR:    Lab Results   Component Value Date    PROTIME 19.5 04/29/2021    INR 1.8 04/29/2021       TSH:    Lab Results   Component Value Date    TSH 1.890 08/18/2021       U/A:    Lab Results   Component Value Date    NITRITE negative 07/26/2018    COLORU Yellow 08/18/2021    PHUR 5.0 08/18/2021    WBCUA NONE 08/18/2021    RBCUA 1-3 08/18/2021    YEAST Present 05/28/2020    BACTERIA MANY 08/18/2021    CLARITYU Clear 08/18/2021    SPECGRAV 1.025 08/18/2021    LEUKOCYTESUR Negative 08/18/2021    UROBILINOGEN 1.0 08/18/2021    BILIRUBINUR SMALL 08/18/2021    BILIRUBINUR negative 07/26/2018    BLOODU Negative 08/18/2021    GLUCOSEU Negative 08/18/2021    AMORPHOUS FEW 08/14/2020       ABG:  No results found for: SIW1GSY, BEART, L0YJANHO, PHART, THGBART, OPF8DBK, PO2ART, WXX4DBG    MICROBIOLOGY:    Blood culture -  Gram stain performed from blood culture bottle media   Gram negative rods   Abnormal      Narrative:           Radiology :    CT abdomen and pelvis -   Impression:       Focal 4.4 cm right adrenal gland lesion is again demonstrated.  This is   increased in size when compared to June 2006.  Surgical consultation   recommended if patient has not had prior surgical workup. Nonspecific mild prominence of retroperitoneal lymphadenopathy adjacent to   the left common iliac artery.              IMPRESSION:     1. GNR bacteremia   2. Left leg cellulitis   3. Morbid obesity, chronic lower extremity lymphadenoma     RECOMMENDATIONS:      1. Meropenem 1 gram IV q 8 hrs ( Hx of E coli bacteremia )  2.  Leg elevation, diuretics     Thank you Dr Leah Webb for the consult

## 2021-08-19 NOTE — PROGRESS NOTES
Hospitalist Progress Note      PCP: Michael Whealn    Date of Admission: 8/18/2021    Chief Complaint: Lower extremity cellulitis lymphedema    Hospital Course:Ms. Nery Blackwell, a 77y.o. year old female  who  has a past medical history of (HFpEF) heart failure with preserved ejection fraction ([facility] [address] Anal fissure, Anemia, Anxiety and depression, Arthritis, Asthma, Atrial fibrillation ([facility] [address] Blood transfusion, CHF (congestive heart failure) ([facility] [address] COPD (chronic obstructive pulmonary disease) ([facility] [address] Disc disorder, DM2 (diabetes mellitus, type 2) ([facility] [address] Fall due to stumbling, GERD (gastroesophageal reflux disease), History of blood transfusion, Hx of blood clots, Hyperlipidemia, Hypertension, Inappropriate sinus tachycardia, LVH (left ventricular hypertrophy), Lymphadenitis, chronic, Occipital neuralgia, Respiratory acidosis, and Sinus tachycardia.      Patient presents emergency department planes of abdominal pain, nausea and vomiting. Patient has a history of lymphadenitis with wounds with history of MRSA and ESBL bacteremia. Nominal pain is been going on for 2 days. Unable to keep food or medicines down. On arrival vital signs revealed the patient had a heart rate in the 120s. EKG showed atrial fibrillation with RVR. She is also initially little hypotensive with pressures 91/55. Lyle Meghan studies notable for sodium 129, potassium 5.2, creatinine 1.7, BUN 49, lactic acid 2.5, troponin 25, hemoglobin 9.8.   Patient has edematous legs with excoriation, scaling, weeping foul-smelling fluid.        Subjective: Patient seen and examined by me personally she complains of bilateral lower extremity swelling morbid obesity with chronic lower extremity lymphedema      Medications:  Reviewed    Infusion Medications    sodium chloride      sodium chloride 75 mL/hr at 08/19/21 0239    dextrose       Scheduled Medications    apixaban  5 mg Oral BID    bumetanide  2 mg Oral BID    dilTIAZem  240 mg Oral BID    escitalopram  20 mg Oral Daily    ezetimibe  10 mg Oral Nightly    ferrous sulfate  325 mg Oral Daily with breakfast    gabapentin  600 mg Oral TID    pantoprazole  40 mg Oral QAM AC    metoprolol succinate  100 mg Oral BID    spironolactone  25 mg Oral Daily    traZODone  50 mg Oral Nightly    [START ON 8/23/2021] vitamin D  50,000 Units Oral Weekly    sodium chloride flush  10 mL Intravenous 2 times per day    insulin glargine  0.25 Units/kg Subcutaneous Nightly    insulin lispro  0-12 Units Subcutaneous TID WC    insulin lispro  0-6 Units Subcutaneous Nightly    meropenem  1,000 mg Intravenous Q8H    sodium chloride  33 mL Intravenous Q8H     PRN Meds: sodium chloride flush, sodium chloride, promethazine **OR** ondansetron, polyethylene glycol, acetaminophen **OR** acetaminophen, glucose, dextrose, glucagon (rDNA), dextrose      Intake/Output Summary (Last 24 hours) at 8/19/2021 1052  Last data filed at 8/19/2021 0920  Gross per 24 hour   Intake --   Output 1000 ml   Net -1000 ml       Exam:    BP (!) 111/53   Pulse 103   Temp 97.2 °F (36.2 °C) (Temporal)   Resp 18   Ht 5' 3\" (1.6 m)   Wt (!) 305 lb 5.4 oz (138.5 kg)   LMP  (LMP Unknown)   SpO2 95%   BMI 54.09 kg/m²     General appearance: No apparent distress, appears stated age and cooperative. HEENT: Pupils equal, round, and reactive to light. Conjunctivae/corneas clear. Neck: Supple, with full range of motion. No jugular venous distention. Trachea midline. Respiratory:  Normal respiratory effort. Clear to auscultation, bilaterally without Rales/Wheezes/Rhonchi. Cardiovascular: Regular rate and rhythm with normal S1/S2 without murmurs, rubs or gallops. Abdomen: Soft, non-tender, non-distended with normal bowel sounds. Musculoskeletal: No clubbing, cyanosis or edema bilaterally. Full range of motion without deformity. Skin: Skin color, texture, turgor normal.  No rashes or lesions.   Neurologic:  Neurovascularly intact without any focal sensory/motor deficits. Cranial nerves: II-XII intact, grossly non-focal.  Psychiatric: Alert and oriented, thought content appropriate, normal insight    Labs:   Recent Labs     08/18/21  0952   WBC 8.8   HGB 9.8*   HCT 31.2*        Recent Labs     08/18/21  0952   *   K 5.2*   CL 89*   CO2 28   BUN 49*   CREATININE 1.7*   CALCIUM 9.1     Recent Labs     08/18/21  0952   AST 13   ALT 11   BILITOT 0.8   ALKPHOS 111*     No results for input(s): INR in the last 72 hours. No results for input(s): Jeramie Keens in the last 72 hours.     Assessment/Plan:    Active Hospital Problems    Diagnosis Date Noted    Sepsis ([facility] [address] [A41.9] 11/27/2018     Sepsis secondary to gram-negative bacteremia  Blood culture positive for gram-negative rods  Patient started on IV antibiotics per ID    Afib with RVR   Continue on IV Cardizem for rate control  Eliquis for anticoagulation  Echo was done 4/14/2021-ejection fraction 65%  Neurology consulted on follow-up    Chronic lymphadenitis lower extremities  Elevation of lower extremities  Wound care, Ace wraps    Hyponatremia  Probably hypovolemic hyponatremia from use of diuretics  Continue on IV fluid    Hyperkalemia  Kayexalate as needed    Acute renal failure  Continue on IV fluid resuscitation  Monitor renal function  Elevated troponin in the setting of YEIMI  Anemia of chronic disease       DVT Prophylaxis: Eliquis   Diet: Diet NPO  Code Status: Full Code    PT/OT Eval Status: continue     Madisyn Sanabria MD

## 2021-08-19 NOTE — CONSULTS
Inpatient Cardiology Consultation      Reason for Consult: A. fib RVR    Consulting Physician: Sam Adams DO    Requesting Physician:  Pamela Garcia DO    Date of Consultation: 8/19/2021    HISTORY OF PRESENT ILLNESS OF Kory Chavez located in  room 7405/7405-A:     Kory Chavez is a 77 y.o. female  known to Dr. Sam Adams DO   Consultation by Dr. Lopez Dear on 5/1/2021 for CHF    Patient has h/o stage II diastolic dysfunction, OM67-88%,  left atrium mildly dilated, mild TR, moderate right ventricular dilatation, moderate pulmonary hypertension, paroxysmal atrial fibrillation/flutter (on Eliquis), HTN, HLD, DM insulin requiring, asthma, COPD on home O2, GERD, anxiety, depression, morbid obesity  She was recently admitted (7/5/2021-7/13/2021) for treatment legs cellulitis. Patient presented to ED of [facility] on 8/18/2021 complaining of nausea, vomiting some diarrhea for the last 2 days. Just before calling EMS she felt rapid heartbeat and became short of breath. She also complained of legs pain for few days with fluid seepage and increased swelling and redness. She denied having chest pains, lightheadedness, fevers, chills. Her Vitals on presentation to ED were: BP 91/55 mm, pulse 121, respiration 18, SPO2  99%, nonfebrile. EKG showed atrial fibrillation with ventricular rate 130. She was admitted with assessment:    Possible sepsis  Atrial fibrillation with RVR  Lymphadenitis lower extremities  Hyponatremia  Hyperkalemia  Acute renal failure  Elevated troponin in the setting of YEIMI  Chronic anemia     Past Medical History:    1. Morbid Obesity   2. 37 pack years quit in 2004  3. LLE DVT in remote past and was placed on coumadin for a period of time  4. HTN  5. HLD: on Pravachol  6. Lipitor myalgias   7. Chronic iron deficiency anemia   8. GERD  9. T2DM insulin neuropathy. 10. Anxiety/Depression  11. Adenosine Stress test 8/2008: No stress induced ischemia, EF 54%  12.  PAF: Not on Coumadin d/t Hx of GIB (2004) and chronic anemia. [id number] score at least 5: (DM, TIA, Female, HTN)--> Eliquis initiated in 2019 per patient without further anemia or endoscopic procedures  13. Questionable TIA (5/2015) with questionable PFO:  14. TTE with bubble study: 6/2/2015:  Mild asymmetric septal hypertrophy, Normal left ventricle size and systolic function, Stage II diastolic dysfunction, Patent foramen ovale with right-to-left shunt with valsalva only was visualized, Trace mitral regurgitation, RVSP is 37 mmHg. 13. Per Dr. Johana Lora: (5/22/2015-office visit note)-\"I personally viewed the echo images myself now.  The images were poor & the atrial septum was not well seen. Juanetta Cornet is not a definite ASD.  A bubble study was performed & was poor quality but the few bubbles that were there did not cross the atrial septum.  The septum is probably aneurysmal & & thus may have a PFO but again the visualization was too poor.  She states that Neuro was still not anum that it was a TIA. \"-Dr. Johana Lora recommended a repeat JIMENA or cardiac MRI-patient reported \"I didn't want the testing\". 16. HFpEF  17. Admitted 01/15/18 with progressive dyspnea troponin normal fUMC=869 hest xray showed b/l effusion, BNP= 814 and respiratory acidosis on ABG x2. She was given DubNebs x3, Solumedrol and placed on BiPAP. CBC showed an anemic Hemoglobin = 8.2. IV diuresed ~2.5 L. 18. TTE 1/16/2018, Dr. Judit Cade concentric left ventricular hypertrophy. EF visually estimated at 55-65%. Stage II diastolic dysfunction. The left atrium is severely dilated. Elevated L atrial pressure. Mild mitral regurgitation. The aortic valve appears mildly sclerotic. Pulmonary hypertension is mild to moderate. No evidence of pericardial effusion.    19. TTE 4/14/2021, Dr. Nedra Skinner. Constance Oddi left ventricle size and systolic function.  Ejection fraction is visually estimated at 60-65%.  No regional wall motion abnormalities seen.  Mild concentric left ventricular hypertrophy.  Indeterminate diastolic function.  The left atrium is mildly dilated.        Moderately dilated right ventricle.  Right ventricle global systolic function is normal . TAPSE 32            mm.  Physiologic and/or trace mitral regurgitation is present.  No hemodynamically significant             aortic stenosis is present.  Mild tricuspid regurgitation.  RVSP is 69 mmHg. Pulmonary      hypertension is moderate.  No evidence for hemodynamically significant pericardial effusion.      Compared to prior echo of , changes noted. Now, there is moderate  pulmonary hypertension      with moderately dilated RV.  20. Untreated moderate SAM, previous AHI 23 , sleep study at [facility] 2018. She was refused repeat evaluation   21. Chronic respiratory failure chronic home O2 (2-4L NC): follows with Dr. Mark Mccallum. 22. Chronic lymphedema and recurrent left lower extremity cellulitis   23. History of anal fissure s/p surgery at [facility], appendectomy, tonsillectomy, colonoscopy s./p polypectomy, L TKA, RLE fracture. EGD  showing gastritis,   24. History of occipital neuralgia   25.  Gait instability: uses a walker.   Past Medical History:   Diagnosis Date    (HFpEF) heart failure with preserved ejection fraction ([facility] [address]     2018- echo- LVEF 55-65%    Anal fissure     Anemia 10/6/2017    Anxiety and depression     Arthritis     Asthma     Atrial fibrillation (HCC)     Eliquis    Blood transfusion     long time no reaction    CHF (congestive heart failure) (HCC)     Diastolic    COPD (chronic obstructive pulmonary disease) (HCC)     Disc disorder     DM2 (diabetes mellitus, type 2) ([facility] [address]     on insulin    Fall due to stumbling 10/6/2017    GERD (gastroesophageal reflux disease)     History of blood transfusion     Hx of blood clots     Hyperlipidemia     Hypertension     Inappropriate sinus tachycardia     LVH (left ventricular hypertrophy)     moderate    Lymphadenitis, chronic 2018  Occipital neuralgia 11/2/2011    Respiratory acidosis 10/7/2017    Sinus tachycardia 2/16/2015       Past Surgical History:    Past Surgical History:   Procedure Laterality Date    ANOSCOPY  10/25/2006    injection of anal sphincter with Botox, [facility]    ANOSCOPY  4/9/2007    injection of anal sphincter with Botox, Dr. Mykel Miller, [facility] ANOSCOPY  6/13/2007    injection of anal sphincter with Botox, [facility]    ANUS SURGERY  4/4/2006    Lateral sphincterotomy for chronic anal fissure, [facility] Connie [facility], [facility]  4/18/2012    anal exam and injection of Botox 100 units into anal sphincter, Laila Haque, [facility]    APPENDECTOMY  1965   [address]    COLONOSCOPY  1/20/2004    cecal polyp, bx (no pathologic changes), Dr. Yuki Esteves, [facility] COLONOSCOPY  8/11/2006    snare polypectomy terminal ileum polyp (granulation tissue polyp) and anal polyp (inflammatory/papilla), Dr. Mykel Miller, [facility] COLONOSCOPY  3/23/2012    bx/cauterization proximal ascending colon polyp, injection of anal sphincter with Botox, Dr. Mykel Miller, [facility]    ENDOSCOPY, COLON, DIAGNOSTIC      FRACTURE SURGERY      R leg fracture with surgery for repair    JOINT REPLACEMENT  2003    L knee    AR DEBRIDEMENT, SKIN, SUB-Q TISSUE,MUSCLE,=<20 SQ CM Left 11/30/2018    LEFT LEG EXCISIONAL  DEBRIDEMENT WITH TISSUE BIOPSY performed by Lyle Garcia DPM at [address] [facility] ENDOSCOPY  1/20/2004    gastritis, bx (no H pylori), Dr. Yuki Esteves, [address] ENDOSCOPY N/A 6/1/2018    EGD BIOPSY performed by Renaye Aase, MD at [address] [place] 11/9/2018    EGD BIOPSY performed by Renaye Aase, MD at [facility] ENDOSCOPY       Medications Prior to admit:  Prior to Admission medications    Medication Sig Start Date End Date Taking?  Authorizing Provider   insulin lispro, 1 Unit Dial, 100 UNIT/ML SOPN INJECT 35 UNITS 3 TIMES A DAY WITH MEALS PLUS SLIDING SCALE 6/30/21   Anam Chamorro MD   dilTIAZem (CARDIZEM CD) 240 MG extended release capsule Take 1 capsule by mouth 2 times daily 5/6/21   Wenceslao Rincon MD   ezetimibe (ZETIA) 10 MG tablet Take 1 tablet by mouth nightly 5/6/21   Wenceslao Rincon MD   metoprolol succinate (TOPROL XL) 100 MG extended release tablet Take 1 tablet by mouth 2 times daily 5/5/21   Wenceslao Rincon MD   insulin glargine (LANTUS SOLOSTAR) 100 UNIT/ML injection pen Inject 45 Units into the skin 2 times daily 55 units 2 times a day    Historical Provider, MD   miconazole (MICOTIN) 2 % powder Apply topically 2 times daily Apply to breasts and abdominal folds    Historical Provider, MD   mineral oil-hydrophilic petrolatum (AQUAPHOR) ointment Apply topically 3 times daily as needed for Dry Skin    Historical Provider, MD   acetaminophen (TYLENOL) 500 MG tablet Take 1,000 mg by mouth every 6 hours as needed for Pain    Historical Provider, MD   escitalopram (LEXAPRO) 20 MG tablet Take 1 tablet by mouth daily 11/27/20   Elo Bingham,    miconazole nitrate 2 % OINT Apply topically 3 times daily as needed (after toileting) 11/26/20   Shayan Hyatt,    apixaban (ELIQUIS) 5 MG TABS tablet Take 5 mg by mouth 2 times daily    Historical Provider, MD   bumetanide (BUMEX) 2 MG tablet Take 2 mg by mouth 2 times daily    Historical Provider, MD   gabapentin (NEURONTIN) 400 MG capsule Take 600 mg by mouth 3 times daily.      Historical Provider, MD   lansoprazole (PREVACID) 30 MG delayed release capsule Take 30 mg by mouth 2 times daily    Historical Provider, MD   traZODone (DESYREL) 50 MG tablet Take 50 mg by mouth nightly    Historical Provider, MD   vitamin D (ERGOCALCIFEROL) 1.25 MG (91257 UT) CAPS capsule Take 50,000 Units by mouth once a week Given Monday    Historical Provider, MD   omega-3 acid ethyl esters (LOVAZA) 1 g capsule Take 2 capsules by mouth 2 times daily 9/1/20   Halima Alvarez,    spironolactone (ALDACTONE) 25 MG tablet Take 1 tablet by mouth daily 8/27/20   Halima Alvarez DO   ferrous sulfate (IRON 325) 325 (65 Fe) MG tablet Take 1 tablet by mouth daily (with breakfast) 8/25/20   Halima Alvarez,        Current Medications:    Current Facility-Administered Medications: apixaban (ELIQUIS) tablet 5 mg, 5 mg, Oral, BID  bumetanide (BUMEX) tablet 2 mg, 2 mg, Oral, BID  dilTIAZem (CARDIZEM CD) extended release capsule 240 mg, 240 mg, Oral, BID  escitalopram (LEXAPRO) tablet 20 mg, 20 mg, Oral, Daily  ezetimibe (ZETIA) tablet 10 mg, 10 mg, Oral, Nightly  ferrous sulfate (IRON 325) tablet 325 mg, 325 mg, Oral, Daily with breakfast  gabapentin (NEURONTIN) capsule 600 mg, 600 mg, Oral, TID  pantoprazole (PROTONIX) tablet 40 mg, 40 mg, Oral, QAM AC  metoprolol succinate (TOPROL XL) extended release tablet 100 mg, 100 mg, Oral, BID  spironolactone (ALDACTONE) tablet 25 mg, 25 mg, Oral, Daily  traZODone (DESYREL) tablet 50 mg, 50 mg, Oral, Nightly  [START ON 8/23/2021] vitamin D (ERGOCALCIFEROL) capsule 50,000 Units, 50,000 Units, Oral, Weekly  sodium chloride flush 0.9 % injection 10 mL, 10 mL, Intravenous, 2 times per day  sodium chloride flush 0.9 % injection 10 mL, 10 mL, Intravenous, PRN  0.9 % sodium chloride infusion, 25 mL, Intravenous, PRN  promethazine (PHENERGAN) tablet 12.5 mg, 12.5 mg, Oral, Q6H PRN **OR** ondansetron (ZOFRAN) injection 4 mg, 4 mg, Intravenous, Q6H PRN  polyethylene glycol (GLYCOLAX) packet 17 g, 17 g, Oral, Daily PRN  acetaminophen (TYLENOL) tablet 650 mg, 650 mg, Oral, Q6H PRN **OR** acetaminophen (TYLENOL) suppository 650 mg, 650 mg, Rectal, Q6H PRN  0.9 % sodium chloride infusion, , Intravenous, Continuous  glucose (GLUTOSE) 40 % oral gel 15 g, 15 g, Oral, PRN  dextrose 50 % IV solution, 12.5 g, Intravenous, PRN  glucagon (rDNA) injection 1 mg, 1 mg, Intramuscular, PRN  dextrose 5 % solution, 100 mL/hr, Intravenous, PRN  insulin glargine (LANTUS) injection vial 30 Units, 0.25 Units/kg, Subcutaneous, Nightly  insulin lispro (HUMALOG) injection vial 0-12 Units, 0-12 Units, Subcutaneous, TID WC  insulin lispro (HUMALOG) injection vial 0-6 Units, 0-6 Units, Subcutaneous, Nightly    Allergies:  Statins    Social History:    Social History     Socioeconomic History    Marital status:      Spouse name: Not on file    Number of children: 3    Years of education: 9    Highest education level: 9th grade   Occupational History    Occupation: SSD   Tobacco Use    Smoking status: Former Smoker     Packs/day: 1.50     Years: 25.00     Pack years: 37.50     Types: Cigarettes     Start date: 1979     Quit date: 2004     Years since quittin.6    Smokeless tobacco: Never Used    Tobacco comment: Stopped smoking 16 years ago   Vaping Use    Vaping Use: Never used   Substance and Sexual Activity    Alcohol use: Not Currently     Comment: 1 can coke daily    Drug use: Not Currently    Sexual activity: Not Currently     Partners: Male   Other Topics Concern    Not on file   Social History Narrative    Denies caffeine. Grandson shops for her and helps around the house    Patient unable to exercise d/t mobility status     Patient lives with her  who has limited speech ability d/t hx of CVA. He is able to assist patient with IADLS     Social Determinants of Health     Financial Resource Strain:     Difficulty of Paying Living Expenses:    Food Insecurity:     Worried About Running Out of Food in the Last Year:     [address] in the Last Year:    Transportation Needs:     Lack of Transportation (Medical):      Lack of Transportation (Non-Medical):    Physical Activity:     Days of Exercise per Week:     Minutes of Exercise per Session:    Stress:     Feeling of Stress :    Social Connections:     Frequency of Communication with Friends and Family:     Frequency of Social Gatherings with Friends and Family:     Attends [employer] Services:     Active Member of Clubs or Organizations:     Attends Club or Organization Meetings:     Marital Status:    Intimate Partner Violence:     Fear of Current or Ex-Partner:     Emotionally Abused:     Physically Abused:     Sexually Abused:        Family History:   Family History   Problem Relation Age of Onset    Heart Disease Mother     Diabetes Father    Esthela Lora Father     Other Father         Onur Gloss Sister     Other Sister         shingles- has had over 1 year    Diabetes Paternal Aunt     Diabetes Paternal Uncle     Diabetes Paternal Aunt     Diabetes Paternal Uncle     Diabetes Sister          REVIEW OF SYSTEMS:     Constitutional: Denies fatigue, fevers, chills, night sweats, yvrose loss, yvrose gain  HEENT: Denies headaches, nose bleeds, and blurred vision,oral pain, oral lesion. Neurological: Has numbness and tingling in her feet. Denies history of stroke, weakness, dizziness and lightheadedness,   Cardiovascular: Has chronic episodic shortness of breath. Has had feeling of heart racing and shortness of breath before coming to ED. Denies chest pain, when lying flat,  palpitations. Respiratory: Uses supplementary oxygen via nasal cannula at home. Denies cough, wheezing, CPAP/BiPAP  Gastrointestinal: Has had nausea, vomiting and diarrhea for couple of days. Denies heartburn, constipation, black/bloody, and tarry stools. Genitourinary:Denies pain on  urination,  blood in urine, trouble starting urination, need to strain on urination, urinary urgency, urinary incontinence. Hematologic: Denies history of easy bruising, prolonged bleeding,  of blood clots in legs  Lymphatic: Denies lumps and bumps to neck, axilla, breast, and groin  Endocrine: Denies excessive thirst. Denies intolerance to heat or cold  Musculoskeletal: Has legs pains, leg swelling. Uses walker.   Psychiatric: Has anxiety and depression. PHYSICAL EXAM:   /61   Pulse 107   Temp 97.9 °F (36.6 °C) (Temporal)   Resp 21   Ht 5' 3\" (1.6 m)   Wt (!) 305 lb 5.4 oz (138.5 kg)   LMP  (LMP Unknown)   SpO2 98%   BMI 10.49 kg/m²   Systolic (16ADX), IBW:034 , Min:91 , MJ:567    Diastolic (07UZT), RZN:45, Min:55, Max:88    CONST: Morbid obese. Awake, alert, cooperative, no apparent distress  HEENT:   Head- Normocephalic, atraumatic   Eyes- Conjunctivae pink, anicteric  Throat- Oral mucosa pink and moist  Neck-  No stridor, trachea midline, no jugular venous distention. No adenopathy   CHEST: Chest symmetrical and non-tender to palpation. No accessory muscle use or intercostal retractions  RESPIRATORY:  Lung sounds - clear throughout fields;   CARDIOVASCULAR:     No carotid bruits  Heart Inspection- shows no noted pulsations  Heart Ausculation- Irregularly irregular  rate and rhythm, no murmur. No s3, s4 or rub   PV: Lower extremity nonpitting edema. Lower legs bandages dry. Pedal pulses palpable due to feet pitting on nonpitting edema, no clubbing or cyanosis   ABDOMEN: Soft, obese slightly tender in mid abdominal quadrant to light palpation. Bowel sounds present. MS: Moves all extremities. : Deferred  SKIN: Warm and moist.  NEURO / PSYCH: Oriented to person, place and time. Speech clear and appropriate. Follows all commands. Pleasant affect     DATA:    ECG reviewed with Dr. Mj Turner strips: A. fib, ventricular rate   Diagnostic:  ECHO04/14/2021 Start: 01:42 PM     Study Location: Portable  Technical Quality: Adequate visualization     Indications:Congestive heart failure.     Patient Status: Routine     Height: 63 inches Weight: 307 pounds BSA: 2.32 m^2 BMI: 54.38 kg/m^2     HR: 85 bpm BP: 150/63 mmHg      Findings      Left Ventricle   Normal left ventricle size and systolic function. Ejection fraction is visually estimated at 60-65%. No regional wall motion abnormalities seen.    Mild concentric left ventricular hypertrophy. Indeterminate diastolic function. Right Ventricle   Moderately dilated right ventricle. Right ventricle global systolic function is normal . TAPSE 32 mm. Left Atrium   The left atrium is mildly dilated. Right Atrium   Normal right atrium size. Mitral Valve   Moderate mitral annular calcification. No evidence of mitral valve stenosis. Physiologic and/or trace mitral regurgitation is present. Tricuspid Valve   The tricuspid valve appears structurally normal.   Mild tricuspid regurgitation. RVSP is 69 mmHg. Pulmonary hypertension is moderate . Aortic Valve   The aortic valve appears mildly sclerotic. The aortic valve is trileaflet. No hemodynamically significant aortic stenosis is present. No evidence of aortic valve regurgitation. Pulmonic Valve   Pulmonic valve is structurally normal.   Physiologic and/or trace pulmonic regurgitation present. No evidence of pulmonic valve stenosis. Pericardial Effusion   No evidence for hemodynamically significant pericardial effusion. Pleural Effusion   No evidence of pleural effusion. Aorta   Normal aortic root and ascending aorta. Miscellaneous   The inferior vena cava diameter is normal with normal respiratory   variation. Conclusions      Summary   Normal left ventricle size and systolic function. Ejection fraction is visually estimated at 60-65%. No regional wall motion abnormalities seen. Mild concentric left ventricular hypertrophy. Indeterminate diastolic function. The left atrium is mildly dilated. Moderately dilated right ventricle. Right ventricle global systolic function is normal . TAPSE 32 mm. Physiologic and/or trace mitral regurgitation is present. No hemodynamically significant aortic stenosis is present. Mild tricuspid regurgitation. RVSP is 69 mmHg. Pulmonary hypertension is moderate .    No evidence for hemodynamically significant pericardial effusion. Compared to prior echo of 2015, changes noted. Now, there is moderate         pulmonary hypertension with moderately dilated RV. CT ABDOMEN PELVIS WO CONTRAST Additional Contrast? None    Result Date: 8/18/2021  EXAMINATION: CT OF THE ABDOMEN AND PELVIS WITHOUT CONTRAST 8/18/2021 11:21 am TECHNIQUE: CT of the abdomen and pelvis was performed without the administration of intravenous contrast. Multiplanar reformatted images are provided for review. Dose modulation, iterative reconstruction, and/or weight based adjustment of the mA/kV was utilized to reduce the radiation dose to as low as reasonably achievable. COMPARISON: None. HISTORY: ORDERING SYSTEM PROVIDED HISTORY: abd pain, emesis, periumbilical TECHNOLOGIST PROVIDED HISTORY: Reason for exam:->abd pain, emesis, periumbilical Additional Contrast?->None Decision Support Exception - unselect if not a suspected or confirmed emergency medical condition->Emergency Medical Condition (MA) What reading provider will be dictating this exam?->CRC FINDINGS: Lower Chest: Bilateral lung bases are clear. Liver: No hepatic lesions identified. Bile ducts: Gallbladder is unremarkable. No evidence of intrahepatic or extrahepatic biliary dilatation. Pancreas: The pancreatic duct is not dilated. Adrenal glands: No left-sided adrenal lesions. There is a 4.4 cm right adrenal gland lesion again demonstrated measuring approximately 33 Hounsfield units. Kidneys: No evidence of hydronephrosis. No evidence of renal or ureteral calculus. Spleen: Normal in appearance. Bowel: No evidence of bowel obstruction. Diverticulosis seen in the sigmoid colon without evidence for diverticulitis. The appendix is not directly visualized, no secondary signs of acute appendicitis. Pelvis: Bladder is decompressed. Mild prominence of retroperitoneal lymphadenopathy adjacent to the left common iliac artery. [place] Uterus is normal for patient's age.  Peritoneum/Retroperitoneum: No abnormal lymphadenopathy. Bones/Soft Tissues: No aggressive osseous lesions. Focal 4.4 cm right adrenal gland lesion is again demonstrated. This is increased in size when compared to June 2006. Surgical consultation recommended if patient has not had prior surgical workup. Nonspecific mild prominence of retroperitoneal lymphadenopathy adjacent to the left common iliac artery. XR CHEST PORTABLE    Result Date: 8/18/2021  EXAMINATION: ONE XRAY VIEW OF THE CHEST 8/18/2021 1:50 pm COMPARISON: April 29, 2021 HISTORY: ORDERING SYSTEM PROVIDED HISTORY: tachycardia TECHNOLOGIST PROVIDED HISTORY: Reason for exam:->tachycardia What reading provider will be dictating this exam?->CRC FINDINGS: Study is magnified. The heart has borderline size. No infiltrates, consolidates or pleural effusions observed. There is no perihilar vascular congestion. Fair advanced osteoarthritic changes are seen in both glenohumeral joints. No acute cardiopulmonary process. Labs:   CARDIAC ENZYMES:  Recent Labs     08/18/21  0952 08/18/21  1323   TROPHS 30* 25*     H/O TROPONIN levels    Lab Results   Component Value Date    TROPHS 25 08/18/2021    TROPHS 30 08/18/2021    TROPONINI 0.02 04/30/2021    TROPONINI 0.02 04/29/2021    TROPONINI <0.01 04/09/2021    TROPONINI <0.01 01/13/2021    TROPONINI <0.01 01/12/2021    TROPONINI <0.01 08/14/2020    TROPONINI <0.01 05/28/2020    TROPONINI <0.01 02/26/2020    TROPONINI <0.01 01/19/2020    TROPONINI <0.01 12/07/2019     No results for input(s): PROBNP in the last 72 hours.   Lab Results   Component Value Date    PROBNP 201 07/05/2021    PROBNP 295 04/09/2021    PROBNP 762 01/15/2021    PROBNP 1,543 01/13/2021    PROBNP 1,005 08/27/2020    PROBNP 800 08/14/2020    PROBNP 176 02/26/2020    PROBNP 719 01/19/2020    PROBNP 827 12/07/2019    PROBNP 243 10/24/2019    PROBNP 403 03/21/2019    PROBNP 460 01/25/2019    PROBNP 929 01/14/2019    PROBNP 1,713 12/17/2018    PROBNP 1,544 12/16/2018    PROBNP atrium mildly dilated  Elevated troponin,  no chest pain, probably type II ischemia in context of A. fib RVR and         YEIMI and possible sepsis  Diastolic dysfunction, stage II, LVEF 60-65%, HFpEF  TR mild  Pulmonary hypertension, moderate   HTN,   HLD, h/o statin (Lipitor) intolerance- muscles pain  Insulin requiring DM,  Chronic anemia  Hyponatremia  Hyperkalemia  Asthma   COPD on, home O2 (2-4L via NC)  GERD,   Anxiety and depression,  Morbid obesity, BMI 54 0.9 kg/m²  Probable SAM: she has in the past declined  testing  History of TIA        PLAN:  Continue Telemetry  ECHO  Electrolytes check and correction    Renal function check   I&O, weights  BP control   Control of HR/venricular response       Further cardiac recommendations will be forthcoming pending patient clinical course and diagnostic test findings. Assessment and plan discussed with Dr. Nasrin Lopez DO    Assessment and Plan to follow as per Dr. Nasrin Lopez DO    Electronically signed by LIV Juarez on 8/19/2021 at 7:14 AM      I have personally seen and evaluated the patient. I personally obtained the history and performed the physical exam.  I personally reviewed all of the above labs, history, review of systems, and data. All of the assessments and recommendations are from me. All of the above cardiac medical decisions are from me. Please see my additional contributions to the history, physical exam, assessment, and recommendations below. History of chief complaint:  She has recently been undergoing treatment for cellulitis of the lower extremities. For the past couple of days she has had nausea, vomiting, and diarrhea. Yesterday she felt rapid heart beats and shortness of breath. Her main complaint is the increased lower extremity swelling, redness and seeping. She denies any chest discomfort. She presented to the emergency room was found to be in atrial fibrillation with RVR and hypotension.     Review of systems: Heart: as above   Lungs: as above   Eyes: denies changes in vision or discharge. Ears: denies changes in hearing or pain. Nose: denies epistaxis or masses   Throat: denies sore throat or trouble swallowing. Neuro: denies numbness, tingling, tremors. Skin: denies rashes or itching. : denies hematuria, dysuria   GI: denies vomiting, diarrhea   Psych: denies mood changed, anxiety, depression. Physical exam:  BP (!) 111/53   Pulse 103   Temp 97.2 °F (36.2 °C) (Temporal)   Resp 18   Ht 5' 3\" (1.6 m)   Wt (!) 305 lb 5.4 oz (138.5 kg)   LMP  (LMP Unknown)   SpO2 95%   BMI 54.09 kg/m²   Constitutional: A&O x3, communicates well, no acute distress. Eyes: extraocular muscles intact, PERRL. Normal lids & conjunctiva. No icterus. ENT: clear, no bleeding. No external masses. Lips normal formation. Neck: supple, full ROM, no JVD, no bruits, no lymphadenopathy. No masses. trachea midline. Heart: Very distant to auscultation. irregular rate & rhythm, normal S1 & S2, unable to appreciate murmurs or gallops. No heave. Lungs: CTA. No accessory muscles. Very poor air movement. Abd: soft, non-tender. Normal bowel sounds. Super morbidly obese. Neuro: Full ROM X 4, EOMI, no tremors. EXT: Both of her legs are wrapped. There severely swollen with chronic lymphedema. Unable to see the skin. Skin: warm, dry, intact. Good turgor. Psych: A&O x 3, normal behavior, not anxious. Patient seen and examined. Chart, labs & data reviewed. A:  She has a history of paroxysmal atrial fibrillation. She is now back in atrial fibrillation with increased heart rate in the setting of cellulitis and possible sepsis. Acute renal insufficiency  Elevated troponin in the setting of sepsis with acute renal insufficiency. Moderate pulmonary hypertension. Diastolic dysfunction. Diabetes. Hypertension. Severe/oxygen requiring COPD.   Hypoxemia on presentation. GERD  Anxiety/depression. Rec:  1. IV Cardizem for rate control. 2. Continue Eliquis  3. Echo 4/14/2021. No need to repeat. 4. I will defer the comorbidities to others. The heart rate should improve as the comorbidities improved. 5. She denies no ischemic symptoms and the troponin is borderline in the setting of sepsis and acute renal insufficiency. I do not plan to pursue an ischemic work-up at this time unless the clinical course changes. 6. With the sepsis, hypotension, and acute renal failure I will hold the Bumex for now. Electronically signed by Chase Saucedo DO on 8/19/2021 at 3:20 PM    Note: This report was completed using computerized voice recognition software. Every effort has been made to ensure accuracy, however; and invert and computerized transcription errors may be present.

## 2021-08-20 LAB
ALBUMIN SERPL-MCNC: 2.5 G/DL (ref 3.5–5.2)
ALP BLD-CCNC: 107 U/L (ref 35–104)
ALT SERPL-CCNC: 12 U/L (ref 0–32)
ANION GAP SERPL CALCULATED.3IONS-SCNC: 13 MMOL/L (ref 7–16)
ANISOCYTOSIS: ABNORMAL
AST SERPL-CCNC: 17 U/L (ref 0–31)
BASOPHILS ABSOLUTE: 0 E9/L (ref 0–0.2)
BASOPHILS RELATIVE PERCENT: 0.2 % (ref 0–2)
BILIRUB SERPL-MCNC: 0.5 MG/DL (ref 0–1.2)
BUN BLDV-MCNC: 49 MG/DL (ref 6–23)
CALCIUM SERPL-MCNC: 8.3 MG/DL (ref 8.6–10.2)
CHLORIDE BLD-SCNC: 93 MMOL/L (ref 98–107)
CO2: 24 MMOL/L (ref 22–29)
CREAT SERPL-MCNC: 1.2 MG/DL (ref 0.5–1)
EOSINOPHILS ABSOLUTE: 0 E9/L (ref 0.05–0.5)
EOSINOPHILS RELATIVE PERCENT: 0.5 % (ref 0–6)
GFR AFRICAN AMERICAN: 54
GFR NON-AFRICAN AMERICAN: 45 ML/MIN/1.73
GLUCOSE BLD-MCNC: 321 MG/DL (ref 74–99)
HCT VFR BLD CALC: 26.1 % (ref 34–48)
HEMOGLOBIN: 8.1 G/DL (ref 11.5–15.5)
HYPOCHROMIA: ABNORMAL
LYMPHOCYTES ABSOLUTE: 0.78 E9/L (ref 1.5–4)
LYMPHOCYTES RELATIVE PERCENT: 7 % (ref 20–42)
MCH RBC QN AUTO: 24.6 PG (ref 26–35)
MCHC RBC AUTO-ENTMCNC: 31 % (ref 32–34.5)
MCV RBC AUTO: 79.3 FL (ref 80–99.9)
METAMYELOCYTES RELATIVE PERCENT: 0.9 % (ref 0–1)
METER GLUCOSE: 309 MG/DL (ref 74–99)
METER GLUCOSE: 338 MG/DL (ref 74–99)
METER GLUCOSE: 344 MG/DL (ref 74–99)
METER GLUCOSE: 348 MG/DL (ref 74–99)
MONOCYTES ABSOLUTE: 0.11 E9/L (ref 0.1–0.95)
MONOCYTES RELATIVE PERCENT: 0.9 % (ref 2–12)
NEUTROPHILS ABSOLUTE: 10.21 E9/L (ref 1.8–7.3)
NEUTROPHILS RELATIVE PERCENT: 91.2 % (ref 43–80)
NUCLEATED RED BLOOD CELLS: 1.8 /100 WBC
PDW BLD-RTO: 17.2 FL (ref 11.5–15)
PLATELET # BLD: 210 E9/L (ref 130–450)
PMV BLD AUTO: 10.3 FL (ref 7–12)
POLYCHROMASIA: ABNORMAL
POTASSIUM REFLEX MAGNESIUM: 4.2 MMOL/L (ref 3.5–5)
RBC # BLD: 3.29 E12/L (ref 3.5–5.5)
SODIUM BLD-SCNC: 130 MMOL/L (ref 132–146)
TOTAL PROTEIN: 5.7 G/DL (ref 6.4–8.3)
WBC # BLD: 11.1 E9/L (ref 4.5–11.5)

## 2021-08-20 PROCEDURE — 6370000000 HC RX 637 (ALT 250 FOR IP): Performed by: INTERNAL MEDICINE

## 2021-08-20 PROCEDURE — 97166 OT EVAL MOD COMPLEX 45 MIN: CPT

## 2021-08-20 PROCEDURE — 82962 GLUCOSE BLOOD TEST: CPT

## 2021-08-20 PROCEDURE — 2580000003 HC RX 258: Performed by: INTERNAL MEDICINE

## 2021-08-20 PROCEDURE — 36415 COLL VENOUS BLD VENIPUNCTURE: CPT

## 2021-08-20 PROCEDURE — 97161 PT EVAL LOW COMPLEX 20 MIN: CPT

## 2021-08-20 PROCEDURE — 2580000003 HC RX 258: Performed by: FAMILY MEDICINE

## 2021-08-20 PROCEDURE — 80053 COMPREHEN METABOLIC PANEL: CPT

## 2021-08-20 PROCEDURE — 2700000000 HC OXYGEN THERAPY PER DAY

## 2021-08-20 PROCEDURE — 6360000002 HC RX W HCPCS: Performed by: INTERNAL MEDICINE

## 2021-08-20 PROCEDURE — 1200000000 HC SEMI PRIVATE

## 2021-08-20 PROCEDURE — 94640 AIRWAY INHALATION TREATMENT: CPT

## 2021-08-20 PROCEDURE — 85025 COMPLETE CBC W/AUTO DIFF WBC: CPT

## 2021-08-20 PROCEDURE — 99231 SBSQ HOSP IP/OBS SF/LOW 25: CPT | Performed by: INTERNAL MEDICINE

## 2021-08-20 PROCEDURE — 6370000000 HC RX 637 (ALT 250 FOR IP): Performed by: FAMILY MEDICINE

## 2021-08-20 PROCEDURE — 97535 SELF CARE MNGMENT TRAINING: CPT

## 2021-08-20 PROCEDURE — 97530 THERAPEUTIC ACTIVITIES: CPT

## 2021-08-20 RX ORDER — INSULIN GLARGINE 100 [IU]/ML
20 INJECTION, SOLUTION SUBCUTANEOUS EVERY MORNING
Status: DISCONTINUED | OUTPATIENT
Start: 2021-08-20 | End: 2021-08-21

## 2021-08-20 RX ORDER — CALCIUM CARBONATE 200(500)MG
500 TABLET,CHEWABLE ORAL 3 TIMES DAILY PRN
Status: DISCONTINUED | OUTPATIENT
Start: 2021-08-20 | End: 2021-08-23 | Stop reason: HOSPADM

## 2021-08-20 RX ORDER — PANTOPRAZOLE SODIUM 40 MG/1
40 TABLET, DELAYED RELEASE ORAL
Status: DISCONTINUED | OUTPATIENT
Start: 2021-08-20 | End: 2021-08-23 | Stop reason: HOSPADM

## 2021-08-20 RX ADMIN — GABAPENTIN 600 MG: 300 CAPSULE ORAL at 08:30

## 2021-08-20 RX ADMIN — DILTIAZEM HYDROCHLORIDE 240 MG: 120 CAPSULE, COATED, EXTENDED RELEASE ORAL at 10:14

## 2021-08-20 RX ADMIN — ACETAMINOPHEN 650 MG: 325 TABLET ORAL at 10:51

## 2021-08-20 RX ADMIN — CALCIUM CARBONATE 500 MG: 500 TABLET, CHEWABLE ORAL at 17:30

## 2021-08-20 RX ADMIN — INSULIN LISPRO 8 UNITS: 100 INJECTION, SOLUTION INTRAVENOUS; SUBCUTANEOUS at 06:45

## 2021-08-20 RX ADMIN — INSULIN LISPRO 8 UNITS: 100 INJECTION, SOLUTION INTRAVENOUS; SUBCUTANEOUS at 16:35

## 2021-08-20 RX ADMIN — MEROPENEM 1000 MG: 1 INJECTION, POWDER, FOR SOLUTION INTRAVENOUS at 02:10

## 2021-08-20 RX ADMIN — Medication 10 ML: at 22:52

## 2021-08-20 RX ADMIN — PANTOPRAZOLE SODIUM 40 MG: 40 TABLET, DELAYED RELEASE ORAL at 19:09

## 2021-08-20 RX ADMIN — INSULIN GLARGINE 20 UNITS: 100 INJECTION, SOLUTION SUBCUTANEOUS at 11:32

## 2021-08-20 RX ADMIN — Medication 10 ML: at 08:30

## 2021-08-20 RX ADMIN — GABAPENTIN 600 MG: 300 CAPSULE ORAL at 14:32

## 2021-08-20 RX ADMIN — ACETAMINOPHEN 650 MG: 325 TABLET ORAL at 17:06

## 2021-08-20 RX ADMIN — SPIRONOLACTONE 25 MG: 25 TABLET ORAL at 08:30

## 2021-08-20 RX ADMIN — IPRATROPIUM BROMIDE AND ALBUTEROL SULFATE 1 AMPULE: .5; 2.5 SOLUTION RESPIRATORY (INHALATION) at 16:04

## 2021-08-20 RX ADMIN — ACETAMINOPHEN 650 MG: 325 TABLET ORAL at 21:19

## 2021-08-20 RX ADMIN — METOPROLOL SUCCINATE 100 MG: 100 TABLET, EXTENDED RELEASE ORAL at 21:19

## 2021-08-20 RX ADMIN — GABAPENTIN 600 MG: 300 CAPSULE ORAL at 21:20

## 2021-08-20 RX ADMIN — ESCITALOPRAM 20 MG: 10 TABLET, FILM COATED ORAL at 10:10

## 2021-08-20 RX ADMIN — SODIUM CHLORIDE: 9 INJECTION, SOLUTION INTRAVENOUS at 22:48

## 2021-08-20 RX ADMIN — MEROPENEM 1000 MG: 1 INJECTION, POWDER, FOR SOLUTION INTRAVENOUS at 10:10

## 2021-08-20 RX ADMIN — INSULIN LISPRO 8 UNITS: 100 INJECTION, SOLUTION INTRAVENOUS; SUBCUTANEOUS at 10:48

## 2021-08-20 RX ADMIN — EZETIMIBE 10 MG: 10 TABLET ORAL at 21:19

## 2021-08-20 RX ADMIN — MEROPENEM 1000 MG: 1 INJECTION, POWDER, FOR SOLUTION INTRAVENOUS at 17:41

## 2021-08-20 RX ADMIN — ACETAMINOPHEN 650 MG: 325 TABLET ORAL at 04:28

## 2021-08-20 RX ADMIN — DILTIAZEM HYDROCHLORIDE 240 MG: 120 CAPSULE, COATED, EXTENDED RELEASE ORAL at 21:20

## 2021-08-20 RX ADMIN — IPRATROPIUM BROMIDE AND ALBUTEROL SULFATE 1 AMPULE: .5; 2.5 SOLUTION RESPIRATORY (INHALATION) at 11:20

## 2021-08-20 RX ADMIN — SODIUM CHLORIDE: 9 INJECTION, SOLUTION INTRAVENOUS at 05:07

## 2021-08-20 RX ADMIN — INSULIN GLARGINE 30 UNITS: 100 INJECTION, SOLUTION SUBCUTANEOUS at 21:20

## 2021-08-20 RX ADMIN — IPRATROPIUM BROMIDE AND ALBUTEROL SULFATE 1 AMPULE: .5; 2.5 SOLUTION RESPIRATORY (INHALATION) at 07:46

## 2021-08-20 RX ADMIN — APIXABAN 5 MG: 5 TABLET, FILM COATED ORAL at 21:19

## 2021-08-20 RX ADMIN — APIXABAN 5 MG: 5 TABLET, FILM COATED ORAL at 08:31

## 2021-08-20 RX ADMIN — IPRATROPIUM BROMIDE AND ALBUTEROL SULFATE 1 AMPULE: .5; 2.5 SOLUTION RESPIRATORY (INHALATION) at 20:34

## 2021-08-20 RX ADMIN — PANTOPRAZOLE SODIUM 40 MG: 40 TABLET, DELAYED RELEASE ORAL at 06:45

## 2021-08-20 RX ADMIN — FERROUS SULFATE TAB 325 MG (65 MG ELEMENTAL FE) 325 MG: 325 (65 FE) TAB at 08:30

## 2021-08-20 RX ADMIN — METOPROLOL SUCCINATE 100 MG: 100 TABLET, EXTENDED RELEASE ORAL at 10:14

## 2021-08-20 RX ADMIN — TRAZODONE HYDROCHLORIDE 50 MG: 50 TABLET ORAL at 21:19

## 2021-08-20 ASSESSMENT — PAIN DESCRIPTION - PAIN TYPE: TYPE: CHRONIC PAIN

## 2021-08-20 ASSESSMENT — PAIN DESCRIPTION - DESCRIPTORS: DESCRIPTORS: ACHING;PRESSURE

## 2021-08-20 ASSESSMENT — PAIN SCALES - GENERAL
PAINLEVEL_OUTOF10: 7
PAINLEVEL_OUTOF10: 6
PAINLEVEL_OUTOF10: 7
PAINLEVEL_OUTOF10: 5

## 2021-08-20 ASSESSMENT — PAIN DESCRIPTION - ONSET: ONSET: UNABLE TO TELL

## 2021-08-20 ASSESSMENT — PAIN DESCRIPTION - PROGRESSION: CLINICAL_PROGRESSION: GRADUALLY IMPROVING

## 2021-08-20 ASSESSMENT — PAIN DESCRIPTION - LOCATION: LOCATION: BACK;GENERALIZED

## 2021-08-20 ASSESSMENT — PAIN DESCRIPTION - FREQUENCY: FREQUENCY: INTERMITTENT

## 2021-08-20 NOTE — CARE COORDINATION
Reviewed chart, PT 12/24 walking only 3 steps, met with pt and discussed VIKTORIA, pt is in agreement. Pt choiced [facility], referral to [facility]), pt accepted and precert started. Envelope, ambullete form, and HENS in soft chart. Rod Sheikh, MSW, LSW

## 2021-08-20 NOTE — PROGRESS NOTES
Physical Therapy  Physical Therapy Initial Assessment     Name: Gabi Gipson  : 1955  MRN: [id number]      Date of Service: 2021    Evaluating PT:  Abdirashid Pena PT, DPT [id number]     Room #:  2895/7416-Q  Diagnosis:  Renal lesion [N28.9]  Sepsis ([facility] [address] [A41.9]  Reason for admission: abdominal pain  Precautions:  Falls, contact iso  Procedure/Surgery:  none  Equipment Recommendations:  none    SUBJECTIVE:  Pt lives with two grandsons ( in a nursing home currently) in a 2 story home, 1st floor setup. Currently sleeping on couch d/t lift chair being broken. Ambulating with use of rollator. OBJECTIVE:   Initial Evaluation  Date:  Treatment   Short Term/ Long Term   Goals   AM-PAC 6 Clicks 04/15     Was pt agreeable to Eval/treatment? Yes      Does pt have pain?  BLEs to light touch      Bed Mobility  Rolling: NT  Supine to sit: MaxA x2  Sit to supine: MaxA x2  Scooting: MaxA x2  ModA   Transfers Sit to stand: [address] [place]  Stand to sit: ModA  Stand pivot: Abbie with [employer]  SBA   Ambulation    3 feet with [employer] Abbie    >15 feet with [employer] SBA   Stair negotiation: ascended and descended  NT  TBD   ROM BUE:  See OT eval   BLE:  WFL     Strength BUE:  See OT eval   BLE:  knee ext 5/5  Ankle DF 5/5  Increase by 1/3 MMT grade    Balance Sitting EOB:  Independent  Dynamic Standing:  Abbie  Sitting EOB:  indep  Dynamic Standing:  SBA     -Pt is A & O x 3  -Sensation:  Pt denies numbness and tingling to extremities  -Edema:  unremarkable     Therapeutic Exercises:  Functional activity     Patient education  Pt educated on safety, sequencing of transfers, and role of PT    Patient response to education:   Pt verbalized understanding Pt demonstrated skill Pt requires further education in this area   Yes  Partial  Yes      ASSESSMENT:  Conditions Requiring Skilled Therapeutic Intervention:  [x]Decreased strength     []Decreased ROM  [x]Decreased functional mobility  [x]Decreased balance   [x]Decreased endurance [x]Decreased posture  []Decreased sensation  [x]Decreased coordination   []Decreased vision  [x]Decreased safety awareness   [x]Increased pain       Comments:  Pt received supine in bed and agreeable to PT session   Pt requires significant assist and encouragement for participation. Hypersensitive to light touch to either LE. Near total assist needed for bed mobility to trunk and LEs. Sitting balance fair. Heavy lift and steady assist to stand with extended time d/t slow movement. Transfer completed x 2 <> bedside commode. Provided time to void. Returned to bed and repositioned. Pt with all needs met and call light in reach. Pt would benefit from continued PT POC to address functional deficits described above. Treatment:  Patient practiced and was instructed in the following treatment:     Patient education provided continuously throughout session for sequencing, safety maintenance, and improving any deficits found during the evaluation.  Bed mobility training - pt given verbal and tactile cues to facilitate proper sequencing and safety during rolling and supine>sit as well as provided with physical assistance to complete task     Sitting EOB for >10 minutes for upright tolerance, postural awareness and BLE ROM    STS and pivot transfer training - pt educated on proper hand and foot placement, safety and sequencing, and use of WW to safely complete sit<>stand and pivot transfers with hands on assistance to complete task safely. SPT completed x 2    Pt's/ family goals   1. Return home    Patient and or family understand(s) diagnosis, prognosis, and plan of care. yes    Prognosis is fair for reaching above PT goals.     PHYSICAL THERAPY PLAN OF CARE:    PT POC is established based on physician order and patient diagnosis     Referring provider/PT Order:    08/19/21 1445  PT eval and treat     Sonny Partida MD     Diagnosis:  Renal lesion [N28.9]  Sepsis ([facility] [address] [A41.9]  Specific instructions for next treatment:  Progress transfers and ambulation. oob in chair    Current Treatment Recommendations:   [x] Strengthening to improve independence with functional mobility   [] ROM to improve independence with functional mobility   [x] Balance Training to improve static/dynamic balance and to reduce fall risk  [x] Endurance Training to improve activity tolerance during functional mobility   [x] Transfer Training to improve safety and independence with all functional transfers   [x] Gait Training to improve gait mechanics, endurance and asses need for appropriate assistive device  [] Stair Training in preparation for safe discharge home and/or into the community   [x] Positioning to prevent skin breakdown and contractures  [x] Safety and Education Training   [] Patient/Caregiver Education   [] HEP  [] Other     PT long term treatment goals are located in above grid    Frequency of treatments: 2-5x/week x 1-2 weeks. Time in  1013  Time out  1045    Total Treatment Time  20 minutes     Evaluation Time includes thorough review of current medical information, gathering information on past medical history/social history and prior level of function, completion of standardized testing/informal observation of tasks, assessment of data and education on plan of care and goals.     CPT codes:  [x] Low Complexity PT evaluation 97828  [] Moderate Complexity PT evaluation 87427  [] High Complexity PT evaluation 73272  [] PT Re-evaluation 01260  [] Gait training 33717 - minutes  [] Manual therapy 29091 - minutes  [x] Therapeutic activities 61631 20 minutes  [] Therapeutic exercises 17496 - minutes  [] Neuromuscular reeducation 18223 - minutes     Giacomo Smith, PT, DPT  [id number]

## 2021-08-20 NOTE — PROGRESS NOTES
Secure messaged cardiology regarding patients BP being 100/53 and morning mediation administration. Per cardiology, hold metoprolol and cardizem until they assess patient.

## 2021-08-20 NOTE — PROGRESS NOTES
PROGRESS NOTE     CARDIOLOGY    Chief complaint: Seen today for follow up, management & recommendations for atrial fibrillation. She denies chest pain or shortness of breath today. She was lying in bed sleeping comfortably. She was in no distress. She awakened easily. Wt Readings from Last 3 Encounters:   08/19/21 (!) 305 lb 5.4 oz (138.5 kg)   07/10/21 299 lb 12.8 oz (136 kg)   06/14/21 275 lb (124.7 kg)     Temp Readings from Last 3 Encounters:   08/20/21 97.8 °F (36.6 °C) (Temporal)   07/13/21 97.3 °F (36.3 °C) (Temporal)   05/24/21 98.1 °F (36.7 °C)     BP Readings from Last 3 Encounters:   08/20/21 117/67   07/13/21 (!) 125/52   06/14/21 108/68     Pulse Readings from Last 3 Encounters:   08/20/21 83   07/13/21 71   06/14/21 75         Intake/Output Summary (Last 24 hours) at 8/20/2021 1247  Last data filed at 8/20/2021 0533  Gross per 24 hour   Intake 1129 ml   Output 700 ml   Net 429 ml       Recent Labs     08/18/21  0952 08/20/21  0804   WBC 8.8 11.1   HGB 9.8* 8.1*   HCT 31.2* 26.1*   MCV 79.4* 79.3*    210     Recent Labs     08/18/21  0952 08/20/21  0804   * 130*   K 5.2* 4.2   CL 89* 93*   CO2 28 24   BUN 49* 49*   CREATININE 1.7* 1.2*     No results for input(s): PROTIME, INR in the last 72 hours. No results for input(s): CKTOTAL, CKMB, CKMBINDEX, TROPONINI in the last 72 hours. No results for input(s): BNP in the last 72 hours. No results for input(s): CHOL, HDL, TRIG in the last 72 hours.     Invalid input(s): CHOLHDLR, LDLCALCU  Recent Labs     08/18/21  0952 08/18/21  1323   TROPHS 30* 25*         insulin glargine (LANTUS) injection vial 20 Units, QAM  apixaban (ELIQUIS) tablet 5 mg, BID  [Held by provider] bumetanide (BUMEX) tablet 2 mg, BID  escitalopram (LEXAPRO) tablet 20 mg, Daily  ezetimibe (ZETIA) tablet 10 mg, Nightly  ferrous sulfate (IRON 325) tablet 325 mg, Daily with breakfast  gabapentin (NEURONTIN) capsule 600 mg, TID  pantoprazole (PROTONIX) tablet 40 mg, QAM AC  metoprolol succinate (TOPROL XL) extended release tablet 100 mg, BID  spironolactone (ALDACTONE) tablet 25 mg, Daily  traZODone (DESYREL) tablet 50 mg, Nightly  [START ON 8/23/2021] vitamin D (ERGOCALCIFEROL) capsule 50,000 Units, Weekly  sodium chloride flush 0.9 % injection 10 mL, 2 times per day  sodium chloride flush 0.9 % injection 10 mL, PRN  0.9 % sodium chloride infusion, PRN  promethazine (PHENERGAN) tablet 12.5 mg, Q6H PRN   Or  ondansetron (ZOFRAN) injection 4 mg, Q6H PRN  polyethylene glycol (GLYCOLAX) packet 17 g, Daily PRN  acetaminophen (TYLENOL) tablet 650 mg, Q6H PRN   Or  acetaminophen (TYLENOL) suppository 650 mg, Q6H PRN  0.9 % sodium chloride infusion, Continuous  glucose (GLUTOSE) 40 % oral gel 15 g, PRN  dextrose 50 % IV solution, PRN  glucagon (rDNA) injection 1 mg, PRN  dextrose 5 % solution, PRN  insulin glargine (LANTUS) injection vial 30 Units, Nightly  insulin lispro (HUMALOG) injection vial 0-12 Units, TID WC  insulin lispro (HUMALOG) injection vial 0-6 Units, Nightly  meropenem (MERREM) 1,000 mg in sodium chloride 0.9 % 100 mL IVPB (mini-bag), Q8H  Post meropenem saline flush, Q8H  ipratropium-albuterol (DUONEB) nebulizer solution 1 ampule, Q4H WA  dilTIAZem 100 mg in dextrose 5 % 100 mL infusion (ADD-[place]), Continuous  dilTIAZem (CARDIZEM CD) extended release capsule 240 mg, BID        Review of systems:     Heart: as above   Lungs: as above   Eyes: denies changes in vision or discharge. Ears: denies changes in hearing or pain. Nose: denies epistaxis or masses   Throat: denies sore throat or trouble swallowing. Neuro: denies numbness, tingling, tremors. Skin: denies rashes or itching. : denies hematuria, dysuria   GI: denies vomiting, diarrhea   Psych: denies mood changed, anxiety, depression. Physical exam:    Constitutional: A&O x3, communicates well, no acute distress. Eyes: extraocular muscles intact, PERRL. Normal lids & conjunctiva.   No icterus. ENT: clear, no bleeding. No external masses. Lips normal formation. Neck: supple, full ROM, no JVD, no bruits, no lymphadenopathy. No masses. trachea midline. Heart: Distant auscultation. Irregular rate & rhythm, normal S1 & S2, I/VI (normal physiologic) systolic murmur. No heave. Lungs: CTA. No accessory muscles. Poor air movement. Abd: soft, non-tender. Normal bowel sounds. Super morbidly obese. Neuro: Full ROM X 4, EOMI, no tremors. EXT: Wrapped  Skin: warm, dry, intact. Good turgor. Psych: A&O x 3, normal behavior, not anxious. Assessment/Recommendations  1. Paroxysmal atrial fibrillation. Now back in atrial fibrillation in the setting of cellulitis and possible sepsis. Heart rate controlled. Off IV Cardizem. Continue heart rate control and anticoagulation for now. Consider cardioversion if she does not convert herself after she is treated for her comorbidities. 2. Cellulitis  3. Bacteremia  4. Acute renal insufficiency. Improving with IV fluids. 5. Elevated troponin in the setting of sepsis and acute renal insufficiency. 6. Moderate pulmonary hypertension. 7. Diastolic dysfunction. Next diabetes. 8. Hypertension  9. Severe oxygen requiring COPD. Hypoxemia on presentation. 10. GERD  11. Anxiety/depression. Note: This report was completed using computerized voice recognition software. Every effort has been made to ensure accuracy, however; and invert and computerized transcription errors may be present.

## 2021-08-20 NOTE — PROGRESS NOTES
General surgery consult placed via [employer] to Dr Zack Moreno. Dr Alanis Pedersen taking new consults    Per Dr Alanis Pedersen, this consult should go to Urology and not general surgery. Dr Gabe Mott messaged via [employer].

## 2021-08-20 NOTE — PROGRESS NOTES
Occupational Therapy  OCCUPATIONAL THERAPY INITIAL EVALUATION    [address] [address] [address]    Date:2021                                             Patient Name: Millie Dandy  MRN: [id number]  : 1955  Room: [address] [place] [address]      Evaluating OT: TRISHA Keys  Supervising therapist: SAÚL Alexander, OTR/L; #[id number]    Referring Provider: Kayden Monson MD  Specific Provider Orders/Date: OT Evaluation and Treatment, 21    Diagnosis: Renal lesion [N28.9]  Sepsis [facility]) [A41.9]  Surgery: None  Pertinent Medical History:  Anemia, Anxiety and depression, Arthritis, Asthma, Atrial fibrillation CHF, COPD, DM2, GERD, Hyperlipidemia, Hypertension, Inappropriate sinus tachycardia, Lymphadenitis, Occipital neuralgia, Respiratory acidosis, Sinus tachycardia, s/p L knee replacement ()     Precautions:  Fall Risk, SpO2, contact ios      Assessment of current deficits   [x] Functional mobility   [x]ADLs  [x] Strength               []Cognition   [x] Functional transfers   [x] IADLs         [x] Safety Awareness   [x]Endurance   [] Fine Coordination              [x] Balance      [] Vision/perception   []Sensation    []Gross Motor Coordination  [x] ROM  [] Delirium                   [] Motor Control     OT PLAN OF CARE   OT POC based on physician orders, patient diagnosis and results of clinical assessment    Frequency/Duration: 1-3 days/wk for 2 weeks PRN   Specific OT Treatment Interventions to include:   * Instruction/training on adapted ADL techniques and AE recommendations to increase functional independence within precautions       * Training on energy conservation strategies, correct breathing pattern and techniques to improve independence/tolerance for self-care routine  * Functional transfer/mobility training/DME recommendations for increased independence, safety, and fall prevention  * Patient/Family education to increase follow through with safety techniques and functional independence  * Recommendation of environmental modifications for increased safety with functional transfers/mobility and ADLs  * Cognitive retraining/development of therapeutic activities to improve problem solving, judgement, memory, and attention for increased safety/participation in ADL/IADL tasks  * Therapeutic exercise to improve motor endurance, ROM, and functional strength for ADLs/functional transfers  * Therapeutic activities to facilitate/challenge dynamic balance, stand tolerance for increased safety and independence with ADLs  * Therapeutic activities to facilitate gross/fine motor skills for increased independence with ADLs  * Positioning to improve skin integrity, interaction with environment and functional independence. Recommended Adaptive Equipment: TBD pending discharge plan     Home Living: Pateint lives with  and 2 grandsons in 2 story house with ramp entery. Bedroom/bathroom are located on 2nd floor but has a 1 floor set-up and sleeps on couch. Bathroom setup: walk-in shower but patient primarily sponge bathes at sink; bsc  Equipment owned: ww, rollator, bsc, SpO2, lift chair, w/c    Prior Level of Function: Assistance with ADLs and with IADLs. Patient completed functional mobility using rollator with grandsons helping with sit<>stand functional transfers with patient reporting mod I with mobility household distance. Patient reports she wears 3L SpO2 at baseline. Driving: No, family assists  Occupation: Retired [employer] employee.   Leisure: Watching drama's on TV    Pain Level: 5/10 in B leg and B feet  Cognition: A&O: 4/4; Follows 1-2 step directions   Memory:  Good   Sequencing:  Fair   Problem solving:  Fair  Judgement/safety:  Fair     Functional Assessment:  AM-PAC Daily Activity Raw Score: 13/24   Initial Eval Status  Date: 8/20/21 Treatment Status  Date: STGs = LTGs  Time frame: 10-14 days   Feeding Set-up  Independent performed with education provided regarding the purpose and benefits of OT session, along with mobility and I/ADL completion. Overall patient demonstrated self-limiting tendencies and decreased activity tolerance limiting independence and safety during completion of ADL/functional transfer/mobility tasks. Patient would benefit from continued skilled OT to increase safety and independence with completion of ADL/IADL tasks for functional independence and improved quality of life. Patient required 2 person assist for safety. Treatment: OT treatment provided this date includes:    Instruction/training on safety and adapted techniques for completion of ADLs: Patient washed face seated in [facility] with set-up assistance and SBA for balance. Patient combed hair seated EOB with Moderate assistance secondary to self-limiting tendencies and fatigue. Patient required SBA for balance. Patient required verbal cues for participation. Patient don/[facility] gown seated EOB with Min assistance for managing lines. Patient required Max A when completing toileting on bsc with total assistance for completing gurmeet-hygiene.   Instruction/training on safe functional mobility/transfer techniques: Therapist assessed and modified environment to maximize safety and patient performance. Patient completed sit <> stand transfer from various surfaces with Mod A with ww and verbal cues for hand positioning. Patient completed stand pivot with Min A and ww with verbal cue for ww positioning. Patient demonstrated good understanding with ww technique. Rehab Potential: Good for established goals     Patient / Family Goal: Not stated      Patient and/or family were instructed on functional diagnosis, prognosis/goals and OT plan of care. Demonstrated good understanding. ·  Eval Complexity: Evaluation Complexity: Mod Complexity  ?  History: Expanded review of medical records and additional review of physical, cognitive, or psychosocial history related to current functional performance  ? Exam: 5+ performance deficits  ? Assistance/Modification: mod/max assistance or modifications required to perform tasks. May have comorbidities that affect occupational performance. Time In: 10:15  Time Out: 10:45  Total Treatment Time: 15 minutes    Min Units   OT Eval Low 59021       OT Eval Medium 97166  x 1   OT Eval High 69617       OT Re-Eval [id number]       Therapeutic Ex 42309       Therapeutic Activities 01342  5  0   ADL/Self Care 43751  10  1   Orthotic Management 44052       Neuro Re-Ed 81425       Non-Billable Time              Evaluation Time includes thorough review of current medical information, gathering information on past medical history/social history and prior level of function, completion of standardized testing/informal observation of tasks, assessment of data and education on plan of care and goals.           Evaluating OT: TRISHA Amos  Supervising therapist: SAÚL Perez, OTR/L; #[id number]

## 2021-08-20 NOTE — PROGRESS NOTES
Hospitalist Progress Note      PCP: Jl Hernandez    Date of Admission: 8/18/2021    Chief Complaint: Lower extremity cellulitis lymphedema    Hospital Course:Ms. Randy Andersen, a 77y.o. year old female  who  has a past medical history of (HFpEF) heart failure with preserved ejection fraction ([facility] [address] Anal fissure, Anemia, Anxiety and depression, Arthritis, Asthma, Atrial fibrillation ([facility] [address] Blood transfusion, CHF (congestive heart failure) ([facility] [address] COPD (chronic obstructive pulmonary disease) ([facility] [address] Disc disorder, DM2 (diabetes mellitus, type 2) ([facility] [address] Fall due to stumbling, GERD (gastroesophageal reflux disease), History of blood transfusion, Hx of blood clots, Hyperlipidemia, Hypertension, Inappropriate sinus tachycardia, LVH (left ventricular hypertrophy), Lymphadenitis, chronic, Occipital neuralgia, Respiratory acidosis, and Sinus tachycardia.      Patient presents emergency department planes of abdominal pain, nausea and vomiting. Patient has a history of lymphadenitis with wounds with history of MRSA and ESBL bacteremia. Nominal pain is been going on for 2 days. Unable to keep food or medicines down. On arrival vital signs revealed the patient had a heart rate in the 120s. EKG showed atrial fibrillation with RVR. She is also initially little hypotensive with pressures 91/55. Sandria Earnest studies notable for sodium 129, potassium 5.2, creatinine 1.7, BUN 49, lactic acid 2.5, troponin 25, hemoglobin 9.8.   Patient has edematous legs with excoriation, scaling, weeping foul-smelling fluid.        Subjective: Patient seen and examined by me personally she complains of bilateral lower extremity swelling morbid obesity with chronic lower extremity lymphedema  Feeling better today more awake alert eating good    Medications:  Reviewed    Infusion Medications    sodium chloride      sodium chloride 75 mL/hr at 08/20/21 0507    dextrose      dilTIAZem (CARDIZEM) 100 mg in dextrose 5% 100 mL (ADD-[place]) Stopped (08/19/21 1710)     Scheduled Medications    insulin glargine  20 Units Subcutaneous QAM    apixaban  5 mg Oral BID    [Held by provider] bumetanide  2 mg Oral BID    escitalopram  20 mg Oral Daily    ezetimibe  10 mg Oral Nightly    ferrous sulfate  325 mg Oral Daily with breakfast    gabapentin  600 mg Oral TID    pantoprazole  40 mg Oral QAM AC    metoprolol succinate  100 mg Oral BID    spironolactone  25 mg Oral Daily    traZODone  50 mg Oral Nightly    [START ON 8/23/2021] vitamin D  50,000 Units Oral Weekly    sodium chloride flush  10 mL Intravenous 2 times per day    insulin glargine  0.25 Units/kg Subcutaneous Nightly    insulin lispro  0-12 Units Subcutaneous TID WC    insulin lispro  0-6 Units Subcutaneous Nightly    meropenem  1,000 mg Intravenous Q8H    sodium chloride  33 mL Intravenous Q8H    ipratropium-albuterol  1 ampule Inhalation Q4H WA    dilTIAZem  240 mg Oral BID     PRN Meds: sodium chloride flush, sodium chloride, promethazine **OR** ondansetron, polyethylene glycol, acetaminophen **OR** acetaminophen, glucose, dextrose, glucagon (rDNA), dextrose      Intake/Output Summary (Last 24 hours) at 8/20/2021 1633  Last data filed at 8/20/2021 0533  Gross per 24 hour   Intake 240 ml   Output 500 ml   Net -260 ml       Exam:    BP (!) 103/57   Pulse 76   Temp 97.5 °F (36.4 °C) (Temporal)   Resp 16   Ht 5' 3\" (1.6 m)   Wt (!) 305 lb 5.4 oz (138.5 kg)   LMP  (LMP Unknown)   SpO2 96%   BMI 54.09 kg/m²     General appearance: No apparent distress, appears stated age and cooperative. HEENT: Pupils equal, round, and reactive to light. Conjunctivae/corneas clear. Neck: Supple, with full range of motion. No jugular venous distention. Trachea midline. Respiratory:  Normal respiratory effort. Clear to auscultation, bilaterally without Rales/Wheezes/Rhonchi. Cardiovascular: Regular rate and rhythm with normal S1/S2 without murmurs, rubs or gallops.   Abdomen: Soft, increased in size when compared to June 2006.  Surgical consultation   recommended if patient has not had prior surgical workup     Will consult surgery     Anemia of chronic disease  Letter H&H transfuse if needed    DVT Prophylaxis: Eliquis   Diet: ADULT DIET;  Regular; 3 carb choices (45 gm/meal)  Code Status: Full Code    PT/OT Eval Status: continue     Marj Thomas MD

## 2021-08-20 NOTE — PROGRESS NOTES
Department of Internal Medicine  Infectious Diseases   Progress Note      C/C : GNR bacteremia, left leg cellulitis     Denies fever or chills  Reports leg pain, swelling   Afebrile     Current Facility-Administered Medications   Medication Dose Route Frequency Provider Last Rate Last Admin    insulin glargine (LANTUS) injection vial 20 Units  20 Units Subcutaneous QAM Rosey Diamond MD   20 Units at 08/20/21 1132    apixaban (ELIQUIS) tablet 5 mg  5 mg Oral BID Kaylen Dine, DO   5 mg at 08/20/21 0831    [Held by provider] bumetanide (BUMEX) tablet 2 mg  2 mg Oral BID Kaylen Dine, DO   2 mg at 08/19/21 0931    escitalopram (LEXAPRO) tablet 20 mg  20 mg Oral Daily Kaylen Dine, DO   20 mg at 08/20/21 1010    ezetimibe (ZETIA) tablet 10 mg  10 mg Oral Nightly Kaylen Dine, DO   10 mg at 08/19/21 2133    ferrous sulfate (IRON 325) tablet 325 mg  325 mg Oral Daily with breakfast Kaylen Dine, DO   325 mg at 08/20/21 0830    gabapentin (NEURONTIN) capsule 600 mg  600 mg Oral TID Kaylen Dine, DO   600 mg at 08/20/21 1432    pantoprazole (PROTONIX) tablet 40 mg  40 mg Oral QAM AC Kaylen Dine, DO   40 mg at 08/20/21 0645    metoprolol succinate (TOPROL XL) extended release tablet 100 mg  100 mg Oral BID Arcadia Nip, DO   100 mg at 08/20/21 1014    spironolactone (ALDACTONE) tablet 25 mg  25 mg Oral Daily Kaylen Dine, DO   25 mg at 08/20/21 0830    traZODone (DESYREL) tablet 50 mg  50 mg Oral Nightly Kaylen Dine, DO   50 mg at 08/19/21 2133    [START ON 8/23/2021] vitamin D (ERGOCALCIFEROL) capsule 50,000 Units  50,000 Units Oral Weekly Kaylen Dine, DO        sodium chloride flush 0.9 % injection 10 mL  10 mL Intravenous 2 times per day Kaylen Dine, DO   10 mL at 08/20/21 0830    sodium chloride flush 0.9 % injection 10 mL  10 mL Intravenous PRN Kaylen Dine, DO        0.9 % sodium chloride infusion  25 mL Intravenous PRN Kaylen Dine, DO        promethazine (PHENERGAN) tablet 12.5 mg  12.5 mg Oral Q6H PRN Judythe Grumbling, DO        Or    ondansetron [facility]) injection 4 mg  4 mg Intravenous Q6H PRN Judythe Grumbling, DO        polyethylene glycol (GLYCOLAX) packet 17 g  17 g Oral Daily PRN Judythe Grumbling, DO        acetaminophen (TYLENOL) tablet 650 mg  650 mg Oral Q6H PRN Judythe Grumbling, DO   650 mg at 08/20/21 1051    Or    acetaminophen (TYLENOL) suppository 650 mg  650 mg Rectal Q6H PRN Judythe Grumbling, DO        0.9 % sodium chloride infusion   Intravenous Continuous Judythe Grumbling, DO 75 mL/hr at 08/20/21 0507 New Bag at 08/20/21 0507    glucose (GLUTOSE) 40 % oral gel 15 g  15 g Oral PRN Judythe Grumbling, DO        dextrose 50 % IV solution  12.5 g Intravenous PRN Judythe Grumbling, DO        glucagon (rDNA) injection 1 mg  1 mg Intramuscular PRN Judythe Grumbling, DO        dextrose 5 % solution  100 mL/hr Intravenous PRN Judythe Grumbling, DO        insulin glargine (LANTUS) injection vial 30 Units  0.25 Units/kg Subcutaneous Nightly Judythe Grumbling, DO   30 Units at 08/19/21 2134    insulin lispro (HUMALOG) injection vial 0-12 Units  0-12 Units Subcutaneous TID WC Judythe Grumbling, DO   8 Units at 08/20/21 1048    insulin lispro (HUMALOG) injection vial 0-6 Units  0-6 Units Subcutaneous Nightly Judythe Grumbling, DO   3 Units at 08/19/21 2134    meropenem (MERREM) 1,000 mg in sodium chloride 0.9 % 100 mL IVPB (mini-bag)  1,000 mg Intravenous Q8H Marquis Philippe MD   Stopped at 08/20/21 1312    Post meropenem saline flush  33 mL Intravenous Q8H Marquis Philippe MD        ipratropium-albuterol (DUONEB) nebulizer solution 1 ampule  1 ampule Inhalation Q4H ALEE CANNON MD   1 ampule at 08/20/21 1604    dilTIAZem 100 mg in dextrose 5 % 100 mL infusion (ADD-[place])  5-15 mg/hr Intravenous Continuous Radha December, DO   Held at 08/19/21 1710    dilTIAZem (CARDIZEM CD) extended release capsule 240 mg  240 mg Oral BID Radha December, DO   240 mg at 08/20/21 1014         REVIEW OF SYSTEMS: CONSTITUTIONAL:  Denies fever or chills   HEENT: denies blurring of vision or double vision, denies hearing problem  RESPIRATORY: SOB . CARDIOVASCULAR:  Denies palpitation  GASTROINTESTINAL: Nausea, vomiting, diarrhea   GENITOURINARY:  Denies burning urination or frequency of urination  INTEGUMENT: Left leg redness   HEMATOLOGIC/LYMPHATIC:  Denies lymph node swelling   MUSCULOSKELETAL: Bilateral leg swelling, left leg erythema   NEUROLOGICAL:  Weakness     PHYSICAL EXAM:      Vitals:     BP (!) 103/57   Pulse 76   Temp 97.5 °F (36.4 °C) (Temporal)   Resp 16   Ht 5' 3\" (1.6 m)   Wt (!) 305 lb 5.4 oz (138.5 kg)   LMP  (LMP Unknown)   SpO2 96%   BMI 54.09 kg/m²     General Appearance:    Awake, alert , no acute distress, morbidly obese    Head:    Normocephalic, atraumatic   Eyes:    No pallor, no icterus,   Ears:    No obvious deformity or drainage.    Nose:   No nasal drainage   Throat:   Mucosa moist, no oral thrush   Neck:   Supple, no lymphadenopathy   Lungs:     Clear to auscultation bilaterally, no wheeze    Heart:    Regular rate and rhythm, no murmur   Abdomen:     Soft, non-tender, bowel sounds present    Extremities:   Bilateral pitting edema, left leg erythema, tender, warm to touch    Pulses:   Dorsalis pedis not palpable    Skin:   Left leg erythema      CBC with Differential:      Lab Results   Component Value Date    WBC 11.1 08/20/2021    RBC 3.29 08/20/2021    HGB 8.1 08/20/2021    HCT 26.1 08/20/2021     08/20/2021    MCV 79.3 08/20/2021    MCH 24.6 08/20/2021    MCHC 31.0 08/20/2021    RDW 17.2 08/20/2021    NRBC 1.8 08/20/2021    SEGSPCT 85 01/29/2014    METASPCT 0.9 08/20/2021    LYMPHOPCT 7.0 08/20/2021    MONOPCT 0.9 08/20/2021    MYELOPCT 2.6 12/04/2018    BASOPCT 0.2 08/20/2021    MONOSABS 0.11 08/20/2021    LYMPHSABS 0.78 08/20/2021    EOSABS 0.00 08/20/2021    BASOSABS 0.00 08/20/2021       CMP     Lab Results   Component Value Date     08/20/2021    K 4.2 08/20/2021 CL 93 08/20/2021    CO2 24 08/20/2021    BUN 49 08/20/2021    CREATININE 1.2 08/20/2021    GFRAA 54 08/20/2021    LABGLOM 45 08/20/2021    GLUCOSE 321 08/20/2021    GLUCOSE 181 12/16/2011    PROT 5.7 08/20/2021    LABALBU 2.5 08/20/2021    LABALBU 4.5 11/02/2011    CALCIUM 8.3 08/20/2021    BILITOT 0.5 08/20/2021    ALKPHOS 107 08/20/2021    AST 17 08/20/2021    ALT 12 08/20/2021         Hepatic Function Panel:    Lab Results   Component Value Date    ALKPHOS 107 08/20/2021    ALT 12 08/20/2021    AST 17 08/20/2021    PROT 5.7 08/20/2021    BILITOT 0.5 08/20/2021    BILIDIR <0.2 12/26/2019    IBILI see below 12/26/2019    LABALBU 2.5 08/20/2021    LABALBU 4.5 11/02/2011       PT/INR:    Lab Results   Component Value Date    PROTIME 19.5 04/29/2021    INR 1.8 04/29/2021       TSH:    Lab Results   Component Value Date    TSH 1.890 08/18/2021       U/A:    Lab Results   Component Value Date    NITRITE negative 07/26/2018    COLORU Yellow 08/18/2021    PHUR 5.0 08/18/2021    WBCUA NONE 08/18/2021    RBCUA 1-3 08/18/2021    YEAST Present 05/28/2020    BACTERIA MANY 08/18/2021    CLARITYU Clear 08/18/2021    SPECGRAV 1.025 08/18/2021    LEUKOCYTESUR Negative 08/18/2021    UROBILINOGEN 1.0 08/18/2021    BILIRUBINUR SMALL 08/18/2021    BILIRUBINUR negative 07/26/2018    BLOODU Negative 08/18/2021    GLUCOSEU Negative 08/18/2021    AMORPHOUS FEW 08/14/2020       ABG:  No results found for: GXL5FIU, BEART, X3AKJFJD, PHART, THGBART, UDP0TFU, PO2ART, XSJ6XKX    MICROBIOLOGY:    Blood culture -  Gram stain performed from blood culture bottle media   Gram negative rods   Abnormal      Narrative:           Radiology :    CT abdomen and pelvis -   Impression:       Focal 4.4 cm right adrenal gland lesion is again demonstrated.  This is   increased in size when compared to June 2006.  Surgical consultation   recommended if patient has not had prior surgical workup.      Nonspecific mild prominence of retroperitoneal

## 2021-08-21 LAB
ALBUMIN SERPL-MCNC: 2.3 G/DL (ref 3.5–5.2)
ALP BLD-CCNC: 150 U/L (ref 35–104)
ALT SERPL-CCNC: 30 U/L (ref 0–32)
ANION GAP SERPL CALCULATED.3IONS-SCNC: 11 MMOL/L (ref 7–16)
ANISOCYTOSIS: ABNORMAL
AST SERPL-CCNC: 41 U/L (ref 0–31)
BASOPHILS ABSOLUTE: 0 E9/L (ref 0–0.2)
BASOPHILS RELATIVE PERCENT: 0.3 % (ref 0–2)
BILIRUB SERPL-MCNC: 0.4 MG/DL (ref 0–1.2)
BLOOD CULTURE, ROUTINE: ABNORMAL
BUN BLDV-MCNC: 61 MG/DL (ref 6–23)
CALCIUM SERPL-MCNC: 8.4 MG/DL (ref 8.6–10.2)
CHLORIDE BLD-SCNC: 91 MMOL/L (ref 98–107)
CO2: 24 MMOL/L (ref 22–29)
CREAT SERPL-MCNC: 1.4 MG/DL (ref 0.5–1)
CULTURE, BLOOD 2: ABNORMAL
EOSINOPHILS ABSOLUTE: 0 E9/L (ref 0.05–0.5)
EOSINOPHILS RELATIVE PERCENT: 1 % (ref 0–6)
GFR AFRICAN AMERICAN: 45
GFR NON-AFRICAN AMERICAN: 37 ML/MIN/1.73
GLUCOSE BLD-MCNC: 375 MG/DL (ref 74–99)
HCT VFR BLD CALC: 28.7 % (ref 34–48)
HEMOGLOBIN: 8.9 G/DL (ref 11.5–15.5)
LYMPHOCYTES ABSOLUTE: 1.46 E9/L (ref 1.5–4)
LYMPHOCYTES RELATIVE PERCENT: 15.7 % (ref 20–42)
MCH RBC QN AUTO: 24.6 PG (ref 26–35)
MCHC RBC AUTO-ENTMCNC: 31 % (ref 32–34.5)
MCV RBC AUTO: 79.3 FL (ref 80–99.9)
METER GLUCOSE: 175 MG/DL (ref 74–99)
METER GLUCOSE: 252 MG/DL (ref 74–99)
METER GLUCOSE: 357 MG/DL (ref 74–99)
METER GLUCOSE: 381 MG/DL (ref 74–99)
MONOCYTES ABSOLUTE: 1 E9/L (ref 0.1–0.95)
MONOCYTES RELATIVE PERCENT: 11.3 % (ref 2–12)
MYELOCYTE PERCENT: 0.9 % (ref 0–0)
NEUTROPHILS ABSOLUTE: 6.46 E9/L (ref 1.8–7.3)
NEUTROPHILS RELATIVE PERCENT: 69.6 % (ref 43–80)
NUCLEATED RED BLOOD CELLS: 1.7 /100 WBC
ORGANISM: ABNORMAL
ORGANISM: ABNORMAL
OVALOCYTES: ABNORMAL
PDW BLD-RTO: 17.2 FL (ref 11.5–15)
PLATELET # BLD: 256 E9/L (ref 130–450)
PMV BLD AUTO: 11 FL (ref 7–12)
POIKILOCYTES: ABNORMAL
POLYCHROMASIA: ABNORMAL
POTASSIUM REFLEX MAGNESIUM: 4.6 MMOL/L (ref 3.5–5)
POTASSIUM SERPL-SCNC: 4.6 MMOL/L (ref 3.5–5)
PROMYELOCYTES PERCENT: 2.6 % (ref 0–0)
RBC # BLD: 3.62 E12/L (ref 3.5–5.5)
SODIUM BLD-SCNC: 126 MMOL/L (ref 132–146)
TOTAL PROTEIN: 5.8 G/DL (ref 6.4–8.3)
URINE CULTURE, ROUTINE: NORMAL
WBC # BLD: 9.1 E9/L (ref 4.5–11.5)

## 2021-08-21 PROCEDURE — 6370000000 HC RX 637 (ALT 250 FOR IP): Performed by: INTERNAL MEDICINE

## 2021-08-21 PROCEDURE — 36410 VNPNXR 3YR/> PHY/QHP DX/THER: CPT

## 2021-08-21 PROCEDURE — 82962 GLUCOSE BLOOD TEST: CPT

## 2021-08-21 PROCEDURE — 6370000000 HC RX 637 (ALT 250 FOR IP): Performed by: FAMILY MEDICINE

## 2021-08-21 PROCEDURE — 2580000003 HC RX 258: Performed by: INTERNAL MEDICINE

## 2021-08-21 PROCEDURE — 85025 COMPLETE CBC W/AUTO DIFF WBC: CPT

## 2021-08-21 PROCEDURE — 6360000002 HC RX W HCPCS: Performed by: INTERNAL MEDICINE

## 2021-08-21 PROCEDURE — 2580000003 HC RX 258: Performed by: FAMILY MEDICINE

## 2021-08-21 PROCEDURE — 76937 US GUIDE VASCULAR ACCESS: CPT

## 2021-08-21 PROCEDURE — 2700000000 HC OXYGEN THERAPY PER DAY

## 2021-08-21 PROCEDURE — C1751 CATH, INF, PER/CENT/MIDLINE: HCPCS

## 2021-08-21 PROCEDURE — 80053 COMPREHEN METABOLIC PANEL: CPT

## 2021-08-21 PROCEDURE — 36415 COLL VENOUS BLD VENIPUNCTURE: CPT

## 2021-08-21 PROCEDURE — 1200000000 HC SEMI PRIVATE

## 2021-08-21 PROCEDURE — 83835 ASSAY OF METANEPHRINES: CPT

## 2021-08-21 PROCEDURE — 05H933Z INSERTION OF INFUSION DEVICE INTO RIGHT BRACHIAL VEIN, PERCUTANEOUS APPROACH: ICD-10-PCS | Performed by: INTERNAL MEDICINE

## 2021-08-21 PROCEDURE — 94640 AIRWAY INHALATION TREATMENT: CPT

## 2021-08-21 RX ORDER — HEPARIN SODIUM (PORCINE) LOCK FLUSH IV SOLN 100 UNIT/ML 100 UNIT/ML
1 SOLUTION INTRAVENOUS PRN
Status: DISCONTINUED | OUTPATIENT
Start: 2021-08-21 | End: 2021-08-23 | Stop reason: HOSPADM

## 2021-08-21 RX ORDER — SODIUM CHLORIDE 1000 MG
1 TABLET, SOLUBLE MISCELLANEOUS
Status: DISCONTINUED | OUTPATIENT
Start: 2021-08-21 | End: 2021-08-23 | Stop reason: HOSPADM

## 2021-08-21 RX ORDER — INSULIN GLARGINE 100 [IU]/ML
35 INJECTION, SOLUTION SUBCUTANEOUS 2 TIMES DAILY
Status: DISCONTINUED | OUTPATIENT
Start: 2021-08-21 | End: 2021-08-21

## 2021-08-21 RX ORDER — INSULIN GLARGINE 100 [IU]/ML
45 INJECTION, SOLUTION SUBCUTANEOUS 2 TIMES DAILY
Status: DISCONTINUED | OUTPATIENT
Start: 2021-08-21 | End: 2021-08-23 | Stop reason: HOSPADM

## 2021-08-21 RX ORDER — SODIUM CHLORIDE 0.9 % (FLUSH) 0.9 %
5-40 SYRINGE (ML) INJECTION EVERY 12 HOURS SCHEDULED
Status: DISCONTINUED | OUTPATIENT
Start: 2021-08-21 | End: 2021-08-23 | Stop reason: HOSPADM

## 2021-08-21 RX ORDER — HEPARIN SODIUM (PORCINE) LOCK FLUSH IV SOLN 100 UNIT/ML 100 UNIT/ML
1 SOLUTION INTRAVENOUS EVERY 12 HOURS SCHEDULED
Status: DISCONTINUED | OUTPATIENT
Start: 2021-08-21 | End: 2021-08-23 | Stop reason: HOSPADM

## 2021-08-21 RX ORDER — SODIUM CHLORIDE 9 MG/ML
25 INJECTION, SOLUTION INTRAVENOUS PRN
Status: DISCONTINUED | OUTPATIENT
Start: 2021-08-21 | End: 2021-08-23 | Stop reason: HOSPADM

## 2021-08-21 RX ORDER — SODIUM CHLORIDE 0.9 % (FLUSH) 0.9 %
5-40 SYRINGE (ML) INJECTION PRN
Status: DISCONTINUED | OUTPATIENT
Start: 2021-08-21 | End: 2021-08-23 | Stop reason: HOSPADM

## 2021-08-21 RX ADMIN — INSULIN LISPRO 10 UNITS: 100 INJECTION, SOLUTION INTRAVENOUS; SUBCUTANEOUS at 06:53

## 2021-08-21 RX ADMIN — FERROUS SULFATE TAB 325 MG (65 MG ELEMENTAL FE) 325 MG: 325 (65 FE) TAB at 08:39

## 2021-08-21 RX ADMIN — INSULIN LISPRO 10 UNITS: 100 INJECTION, SOLUTION INTRAVENOUS; SUBCUTANEOUS at 11:40

## 2021-08-21 RX ADMIN — MEROPENEM 1000 MG: 1 INJECTION, POWDER, FOR SOLUTION INTRAVENOUS at 02:21

## 2021-08-21 RX ADMIN — PANTOPRAZOLE SODIUM 40 MG: 40 TABLET, DELAYED RELEASE ORAL at 06:52

## 2021-08-21 RX ADMIN — IPRATROPIUM BROMIDE AND ALBUTEROL SULFATE 1 AMPULE: .5; 2.5 SOLUTION RESPIRATORY (INHALATION) at 16:36

## 2021-08-21 RX ADMIN — INSULIN LISPRO 35 UNITS: 100 INJECTION, SOLUTION INTRAVENOUS; SUBCUTANEOUS at 16:20

## 2021-08-21 RX ADMIN — IPRATROPIUM BROMIDE AND ALBUTEROL SULFATE 1 AMPULE: .5; 2.5 SOLUTION RESPIRATORY (INHALATION) at 20:48

## 2021-08-21 RX ADMIN — INSULIN LISPRO 1 UNITS: 100 INJECTION, SOLUTION INTRAVENOUS; SUBCUTANEOUS at 21:52

## 2021-08-21 RX ADMIN — INSULIN LISPRO 6 UNITS: 100 INJECTION, SOLUTION INTRAVENOUS; SUBCUTANEOUS at 16:20

## 2021-08-21 RX ADMIN — INSULIN LISPRO 35 UNITS: 100 INJECTION, SOLUTION INTRAVENOUS; SUBCUTANEOUS at 11:47

## 2021-08-21 RX ADMIN — DILTIAZEM HYDROCHLORIDE 240 MG: 120 CAPSULE, COATED, EXTENDED RELEASE ORAL at 08:39

## 2021-08-21 RX ADMIN — IPRATROPIUM BROMIDE AND ALBUTEROL SULFATE 1 AMPULE: .5; 2.5 SOLUTION RESPIRATORY (INHALATION) at 12:12

## 2021-08-21 RX ADMIN — PANTOPRAZOLE SODIUM 40 MG: 40 TABLET, DELAYED RELEASE ORAL at 16:20

## 2021-08-21 RX ADMIN — METOPROLOL SUCCINATE 100 MG: 100 TABLET, EXTENDED RELEASE ORAL at 21:48

## 2021-08-21 RX ADMIN — INSULIN GLARGINE 20 UNITS: 100 INJECTION, SOLUTION SUBCUTANEOUS at 08:44

## 2021-08-21 RX ADMIN — Medication 10 ML: at 09:48

## 2021-08-21 RX ADMIN — APIXABAN 5 MG: 5 TABLET, FILM COATED ORAL at 08:39

## 2021-08-21 RX ADMIN — IPRATROPIUM BROMIDE AND ALBUTEROL SULFATE 1 AMPULE: .5; 2.5 SOLUTION RESPIRATORY (INHALATION) at 08:32

## 2021-08-21 RX ADMIN — ACETAMINOPHEN 650 MG: 325 TABLET ORAL at 03:43

## 2021-08-21 RX ADMIN — ACETAMINOPHEN 650 MG: 325 TABLET ORAL at 11:25

## 2021-08-21 RX ADMIN — SODIUM CHLORIDE, PRESERVATIVE FREE 10 ML: 5 INJECTION INTRAVENOUS at 21:49

## 2021-08-21 RX ADMIN — INSULIN LISPRO 4 UNITS: 100 INJECTION, SOLUTION INTRAVENOUS; SUBCUTANEOUS at 00:36

## 2021-08-21 RX ADMIN — Medication 1 G: at 17:59

## 2021-08-21 RX ADMIN — GABAPENTIN 600 MG: 300 CAPSULE ORAL at 21:49

## 2021-08-21 RX ADMIN — MEROPENEM 1000 MG: 1 INJECTION, POWDER, FOR SOLUTION INTRAVENOUS at 17:59

## 2021-08-21 RX ADMIN — ESCITALOPRAM 20 MG: 10 TABLET, FILM COATED ORAL at 08:40

## 2021-08-21 RX ADMIN — ACETAMINOPHEN 650 MG: 325 TABLET ORAL at 17:59

## 2021-08-21 RX ADMIN — MEROPENEM 1000 MG: 1 INJECTION, POWDER, FOR SOLUTION INTRAVENOUS at 11:01

## 2021-08-21 RX ADMIN — INSULIN GLARGINE 45 UNITS: 100 INJECTION, SOLUTION SUBCUTANEOUS at 22:50

## 2021-08-21 RX ADMIN — DILTIAZEM HYDROCHLORIDE 240 MG: 120 CAPSULE, COATED, EXTENDED RELEASE ORAL at 21:49

## 2021-08-21 RX ADMIN — SPIRONOLACTONE 25 MG: 25 TABLET ORAL at 08:39

## 2021-08-21 RX ADMIN — GABAPENTIN 600 MG: 300 CAPSULE ORAL at 08:39

## 2021-08-21 RX ADMIN — METOPROLOL SUCCINATE 100 MG: 100 TABLET, EXTENDED RELEASE ORAL at 08:41

## 2021-08-21 RX ADMIN — TRAZODONE HYDROCHLORIDE 50 MG: 50 TABLET ORAL at 21:49

## 2021-08-21 RX ADMIN — GABAPENTIN 600 MG: 300 CAPSULE ORAL at 14:42

## 2021-08-21 RX ADMIN — APIXABAN 5 MG: 5 TABLET, FILM COATED ORAL at 21:48

## 2021-08-21 RX ADMIN — HEPARIN 100 UNITS: 100 SYRINGE at 22:51

## 2021-08-21 ASSESSMENT — PAIN SCALES - GENERAL
PAINLEVEL_OUTOF10: 0
PAINLEVEL_OUTOF10: 6
PAINLEVEL_OUTOF10: 7
PAINLEVEL_OUTOF10: 0
PAINLEVEL_OUTOF10: 7

## 2021-08-21 ASSESSMENT — ENCOUNTER SYMPTOMS
COUGH: 0
CONSTIPATION: 0
VOICE CHANGE: 0
SHORTNESS OF BREATH: 0
NAUSEA: 1
DIARRHEA: 0
RHINORRHEA: 0
ABDOMINAL PAIN: 1
VOMITING: 1

## 2021-08-21 ASSESSMENT — PAIN DESCRIPTION - ORIENTATION: ORIENTATION: RIGHT;LEFT

## 2021-08-21 ASSESSMENT — PAIN DESCRIPTION - LOCATION: LOCATION: LEG

## 2021-08-21 ASSESSMENT — PAIN DESCRIPTION - PAIN TYPE: TYPE: ACUTE PAIN

## 2021-08-21 ASSESSMENT — PAIN DESCRIPTION - ONSET: ONSET: ON-GOING

## 2021-08-21 ASSESSMENT — PAIN - FUNCTIONAL ASSESSMENT: PAIN_FUNCTIONAL_ASSESSMENT: PREVENTS OR INTERFERES SOME ACTIVE ACTIVITIES AND ADLS

## 2021-08-21 ASSESSMENT — PAIN DESCRIPTION - PROGRESSION: CLINICAL_PROGRESSION: NOT CHANGED

## 2021-08-21 ASSESSMENT — PAIN DESCRIPTION - FREQUENCY: FREQUENCY: INTERMITTENT

## 2021-08-21 ASSESSMENT — PAIN DESCRIPTION - DESCRIPTORS: DESCRIPTORS: ACHING;DISCOMFORT

## 2021-08-21 NOTE — PROGRESS NOTES
POWER CHG MIDLINE  Placement 8/21/2021    Product number: [id number]   Lot Number: [id number]      Ultrasound: YES/SONOSITE   Right Basilic vein:                Upper Arm Circumference: 28    Size: 4.5    Exposed Length: 0    Internal Length: 13   Cut: 42   Vein Measurement: 0.51    Samson Turner RN  8/21/2021  3:10 PM

## 2021-08-21 NOTE — CONSULTS
INPATIENT CONSULTATION RECORD FOR  8/21/2021      [facility]  [address], [address] [facility]  [phone]        REASON FOR CONSULTATION: Right adrenal mass      HISTORY OF PRESENT ILLNESS:      The patient is a 77 y.o. female patient who was admitted with abdominal pain nausea and vomiting. She is feeling better. She is being seen by infectious disease. Urine culture on 8/18/21 was negative. She has a known right adrenal mass which has been present since at least 2006 according to documentation. She was aware this but does not recall any details or if she is ever had evaluation of this by urologist.  She denies any chest pain. She denies any cardiac palpitations. She denies any significant hot or cold temperature fluctuations. The lesion in the right adrenal gland measure approximately 3.4 cm over a year ago on imaging. She had a CT scan on 8/18/2021 which I reviewed. This shows the right adrenal lesion to be 4.4 cm. She currently denies any flank or abdominal pain. She is voiding comfortably.   She denies gross hematuria, dysuria, UTIs      Past Medical History:   Diagnosis Date    (HFpEF) heart failure with preserved ejection fraction ([facility] [address]     1/16/2018- echo- LVEF 55-65%    Anal fissure     Anemia 10/6/2017    Anxiety and depression     Arthritis     Asthma     Atrial fibrillation (HCC)     Eliquis    Blood transfusion     long time no reaction    CHF (congestive heart failure) (HCC)     Diastolic    COPD (chronic obstructive pulmonary disease) (HCC)     Disc disorder     DM2 (diabetes mellitus, type 2) ([facility] [address]     on insulin    Fall due to stumbling 10/6/2017    GERD (gastroesophageal reflux disease)     History of blood transfusion     Hx of blood clots     Hyperlipidemia     Hypertension     Inappropriate sinus tachycardia     LVH (left ventricular hypertrophy)     moderate    Lymphadenitis, chronic 4/13/2018    Occipital neuralgia 11/2/2011    Respiratory acidosis 10/7/2017    Sinus tachycardia 2/16/2015         Past Surgical History:   Procedure Laterality Date    ANOSCOPY  10/25/2006    injection of anal sphincter with Botox, Franklyn Ortiz/Timmy, [facility]    ANOSCOPY  4/9/2007    injection of anal sphincter with Botox, Dr. Aniyah Dexter, [address] [place] [address] ANOSCOPY  6/13/2007    injection of anal sphincter with Botox, Franklyn Cruz [facility]    ANUS SURGERY  4/4/2006    Lateral sphincterotomy for chronic anal fissure, [facility] Kiara Luke, [facility]  4/18/2012    anal exam and injection of Botox 100 units into anal sphincter, Laila Cruz, [facility]    APPENDECTOMY  1965   [address]    COLONOSCOPY  1/20/2004    cecal polyp, bx (no pathologic changes), Dr. Isi Guadalupe, [address] [facility] COLONOSCOPY  8/11/2006    snare polypectomy terminal ileum polyp (granulation tissue polyp) and anal polyp (inflammatory/papilla), Dr. Aniyah Dexter, [address] [place] [address] COLONOSCOPY  3/23/2012    bx/cauterization proximal ascending colon polyp, injection of anal sphincter with Botox, Dr. Aniyah Dexter, [facility]    ENDOSCOPY, COLON, DIAGNOSTIC      FRACTURE SURGERY      R leg fracture with surgery for repair    JOINT REPLACEMENT  2003    L knee    DC DEBRIDEMENT, SKIN, SUB-Q TISSUE,MUSCLE,=<20 SQ CM Left 11/30/2018    LEFT LEG EXCISIONAL  DEBRIDEMENT WITH TISSUE BIOPSY performed by Jarrod Tidwell DPM at [address] [facility] ENDOSCOPY  1/20/2004    gastritis, bx (no H pylori), Dr. Isi Guadalupe, [address] ENDOSCOPY N/A 6/1/2018    EGD BIOPSY performed by Treva Alves MD at [address] [facility] N/A 11/9/2018    EGD BIOPSY performed by Treva Alves MD at [facility] ENDOSCOPY       Medications Prior to Admission:    Medications Prior to Admission: insulin lispro, 1 Unit Dial, 100 UNIT/ML SOPN, INJECT 35 UNITS 3 TIMES A DAY WITH MEALS PLUS SLIDING SCALE  dilTIAZem (CARDIZEM CD) 240 MG extended release capsule, Take 1 capsule by mouth 2 times daily  ezetimibe (ZETIA) 10 MG tablet, Take 1 tablet by mouth nightly  metoprolol succinate (TOPROL XL) 100 MG extended release tablet, Take 1 tablet by mouth 2 times daily  insulin glargine (LANTUS SOLOSTAR) 100 UNIT/ML injection pen, Inject 45 Units into the skin 2 times daily 55 units 2 times a day  miconazole (MICOTIN) 2 % powder, Apply topically 2 times daily Apply to breasts and abdominal folds  mineral oil-hydrophilic petrolatum (AQUAPHOR) ointment, Apply topically 3 times daily as needed for Dry Skin  acetaminophen (TYLENOL) 500 MG tablet, Take 1,000 mg by mouth every 6 hours as needed for Pain  escitalopram (LEXAPRO) 20 MG tablet, Take 1 tablet by mouth daily  miconazole nitrate 2 % OINT, Apply topically 3 times daily as needed (after toileting)  apixaban (ELIQUIS) 5 MG TABS tablet, Take 5 mg by mouth 2 times daily  bumetanide (BUMEX) 2 MG tablet, Take 2 mg by mouth 2 times daily  gabapentin (NEURONTIN) 400 MG capsule, Take 600 mg by mouth 3 times daily. lansoprazole (PREVACID) 30 MG delayed release capsule, Take 30 mg by mouth 2 times daily  traZODone (DESYREL) 50 MG tablet, Take 50 mg by mouth nightly  vitamin D (ERGOCALCIFEROL) 1.25 MG (08129 UT) CAPS capsule, Take 50,000 Units by mouth once a week Given Monday  omega-3 acid ethyl esters (LOVAZA) 1 g capsule, Take 2 capsules by mouth 2 times daily  spironolactone (ALDACTONE) 25 MG tablet, Take 1 tablet by mouth daily  ferrous sulfate (IRON 325) 325 (65 Fe) MG tablet, Take 1 tablet by mouth daily (with breakfast)    Allergies:    Statins    Social History:   She  reports that she quit smoking about 16 years ago. Her smoking use included cigarettes. She started smoking about 41 years ago. She has a 37.50 pack-year smoking history. She has never used smokeless tobacco. She reports previous alcohol use. She reports previous drug use. She is . She is disabled.   She has 3 children    Family History:   Non-contributory to this Urological problem  family history includes Colon Cancer in her father and sister; Diabetes in her father, paternal aunt, paternal aunt, paternal uncle, paternal uncle, and sister; Heart Disease in her mother; Other in her father and sister. REVIEW OF SYSTEMS:  Respiratory: denies any symptoms of a productive cough, shortness of breath, or hemoptysis  Cardiovascular: denies chest pain, dyspnea, or cardiac murmur  Gastrointestinal: negative for abdominal pain, diarrhea, or constipation  Dermatologic: denies any rashes, skin lesion(s), or pruritis  Neurological: negative for headaches, seizures, and tremors  Endocrine: negative for diabetic symptoms including polydipsia and polyuria or thyroid dysfunction  Ocular: denies retinopathy or glaucoma  ENT: denies sinusitis or epistaxis  Constitutional: denies nausea, vomiting, fever, or chills  Psychiatric: denies anxiety or depression  : denies gross hematuria or UTIs    PHYSICAL EXAM:    Vitals:  /62   Pulse 75   Temp 97 °F (36.1 °C) (Temporal)   Resp 18   Ht 5' 3\" (1.6 m)   Wt (!) 305 lb 5.4 oz (138.5 kg)   LMP  (LMP Unknown)   SpO2 97%   BMI 54.09 kg/m²     General:  Awake, alert, oriented X 3. Well developed, well nourished, well groomed. No apparent distress. HEENT:  Normocephalic, atraumatic. Pupils equal, round. No scleral icterus. No conjunctival injection. Normal lips, teeth, and gums. No nasal discharge. Neck:  Supple, no masses. Heart: Irregularly irregular  Lungs:  No audible wheezing. Respirations symmetric and non-labored. Clear bilaterally. Abdomen:  Soft, nontender, no masses, no organomegaly, no peritoneal signs. Active bowel sounds. No rebound or guarding. No flank or CVA tenderness. She is morbidly obese  Extremities:  No clubbing, cyanosis, or edema. Brisk peripheral pulses. Skin:  Warm and dry, no open lesions or rashes.   She has 2+ lower extremity lymphedema with venous stasis ulcerations. Both her lower limbs are wrapped with gauze  Neuro:  Cranial nerves 2-12 intact, no focal motor or sensory deficits. Alert and oriented. Rectal: Deferred  Genitalia: Deferred    LABS:    Lab Results   Component Value Date    WBC 9.1 08/21/2021    HGB 8.9 (L) 08/21/2021    HCT 28.7 (L) 08/21/2021    MCV 79.3 (L) 08/21/2021     08/21/2021       Lab Results   Component Value Date    BUN 61 (H) 08/21/2021    CREATININE 1.4 (H) 08/21/2021           ASSESSMENT / PLAN:    Right adrenal mass  Her imaging was reviewed. She has an adrenal lesion on the right side present since at least 2006 with slight increase in size over the years. Most recent imaging 3 days ago shows the lesion to now be  4.4 cm. This is likely a benign adrenal adenoma. Whether or not it is functional or nonfunctional remains to be determined. A metabolic evaluation with full chemistries and an MRI will be performed for further evaluation. She is certainly not a candidate for a laparoscopic adrenalectomy at this point. Based on size criteria it is likely not malignant but will need to be monitored. She would likely require referral to a tertiary care center for adrenalectomy if it comes to this. She is voiding well could be managed at a [facility]. I will continue to follow her during her hospital stay.   Sincerely,      Lady Dianne MD  10:43 AM  8/21/2021

## 2021-08-21 NOTE — PROGRESS NOTES
Hospitalist Progress Note      PCP: Eze Isidro    Date of Admission: 8/18/2021    Chief Complaint: Lower extremity cellulitis lymphedema    Hospital Course:Ms. Mirza Kahn, a 77y.o. year old female  who  has a past medical history of (HFpEF) heart failure with preserved ejection fraction ([facility] [address] Anal fissure, Anemia, Anxiety and depression, Arthritis, Asthma, Atrial fibrillation ([facility] [address] Blood transfusion, CHF (congestive heart failure) ([facility] [address] COPD (chronic obstructive pulmonary disease) ([facility] [address] Disc disorder, DM2 (diabetes mellitus, type 2) ([facility] [address] Fall due to stumbling, GERD (gastroesophageal reflux disease), History of blood transfusion, Hx of blood clots, Hyperlipidemia, Hypertension, Inappropriate sinus tachycardia, LVH (left ventricular hypertrophy), Lymphadenitis, chronic, Occipital neuralgia, Respiratory acidosis, and Sinus tachycardia.      Patient presents emergency department planes of abdominal pain, nausea and vomiting. Patient has a history of lymphadenitis with wounds with history of MRSA and ESBL bacteremia. Nominal pain is been going on for 2 days. Unable to keep food or medicines down. On arrival vital signs revealed the patient had a heart rate in the 120s. EKG showed atrial fibrillation with RVR. She is also initially little hypotensive with pressures 91/55. Thelma Rucks studies notable for sodium 129, potassium 5.2, creatinine 1.7, BUN 49, lactic acid 2.5, troponin 25, hemoglobin 9.8. Patient has edematous legs with excoriation, scaling, weeping foul-smelling fluid.   Blood culture positive for Shewanella bacteremia     Subjective: Patient seen and examined by me personally she complains of bilateral lower extremity swelling morbid obesity with chronic lower extremity lymphedema  Feeling better, eating good    Medications:  Reviewed    Infusion Medications    sodium chloride      sodium chloride 75 mL/hr at 08/20/21 4269    dextrose      dilTIAZem (CARDIZEM) 100 mg in dextrose 5% 100 mL ([facility]) Stopped (08/19/21 1710)     Scheduled Medications    insulin glargine  45 Units Subcutaneous BID    insulin lispro  35 Units Subcutaneous TID WC    lidocaine  5 mL Intradermal Once    sodium chloride flush  5-40 mL Intravenous 2 times per day    heparin flush  1 mL Intravenous 2 times per day    sodium chloride  1 g Oral TID WC    pantoprazole  40 mg Oral BID AC    apixaban  5 mg Oral BID    [Held by provider] bumetanide  2 mg Oral BID    escitalopram  20 mg Oral Daily    ezetimibe  10 mg Oral Nightly    ferrous sulfate  325 mg Oral Daily with breakfast    gabapentin  600 mg Oral TID    metoprolol succinate  100 mg Oral BID    spironolactone  25 mg Oral Daily    traZODone  50 mg Oral Nightly    [START ON 8/23/2021] vitamin D  50,000 Units Oral Weekly    insulin lispro  0-12 Units Subcutaneous TID WC    insulin lispro  0-6 Units Subcutaneous Nightly    meropenem  1,000 mg Intravenous Q8H    sodium chloride  33 mL Intravenous Q8H    ipratropium-albuterol  1 ampule Inhalation Q4H WA    dilTIAZem  240 mg Oral BID     PRN Meds: sodium chloride flush, sodium chloride, heparin flush, calcium carbonate, promethazine **OR** ondansetron, polyethylene glycol, acetaminophen **OR** acetaminophen, glucose, dextrose, glucagon (rDNA), dextrose      Intake/Output Summary (Last 24 hours) at 8/21/2021 1602  Last data filed at 8/21/2021 1337  Gross per 24 hour   Intake 340 ml   Output 300 ml   Net 40 ml       Exam:    BP (!) 100/54   Pulse 68   Temp 95.9 °F (35.5 °C) (Temporal)   Resp 18   Ht 5' 3\" (1.6 m)   Wt (!) 305 lb 5.4 oz (138.5 kg)   LMP  (LMP Unknown)   SpO2 95%   BMI 54.09 kg/m²     General appearance: No apparent distress, appears stated age and cooperative. HEENT: Pupils equal, round, and reactive to light. Conjunctivae/corneas clear. Neck: Supple, with full range of motion. No jugular venous distention. Trachea midline. Respiratory:  Normal respiratory effort.  Clear to cm right adrenal gland lesion is again demonstrated.  This is   increased in size when compared to June 2006.  Surgical consultation   recommended if patient has not had prior surgical workup     Will consult surgery     Anemia of chronic disease  Letter H&H transfuse if needed    DVT Prophylaxis: Eliquis   Diet: ADULT DIET;  Regular; 3 carb choices (45 gm/meal)  Code Status: Full Code    PT/OT Eval Status: continue     Jimmy Marroquin MD

## 2021-08-21 NOTE — PLAN OF CARE
24-year-old  female with BMI 54. Paroxysmal atrial fibrillation with rapid ventricular rate in the setting of cellulitis and sepsis. YEIMI, prerenal.  Minimal acute myocardial injury in this setting (hs-Hank 30 and trending down). COPD on home oxygen. · Continue oral anticoagulation with apixaban  · Continue rate control with metoprolol and diltiazem. Lenient rate control in the setting of acute infection. · Follow-up with Dr. Kerry Shay after discharge    We will sign off.   Please call with any questions or concerns    Yoseph Perkins MD, [facility] [place]  Interventional Cardiology/Structural Heart Disease  Office: [phone]  Coordinator: Breanne Blue

## 2021-08-21 NOTE — PROGRESS NOTES
Patient complaining of coccyx pain. RN assessed area. There is no abnormalities. RN educated patient on turning q2 hours, patient understood. RN brought in the taps with wedges and patient refused.

## 2021-08-21 NOTE — PROGRESS NOTES
Department of Internal Medicine  Infectious Diseases   Progress Note      C/C : GNR bacteremia, left leg cellulitis     Denies fever or chills  Reports leg pain, swelling   Afebrile     Current Facility-Administered Medications   Medication Dose Route Frequency Provider Last Rate Last Admin    insulin glargine (LANTUS) injection vial 45 Units  45 Units Subcutaneous BID Mayda Brown MD        insulin lispro (HUMALOG) injection vial 35 Units  35 Units Subcutaneous TID  Mayda Brown MD   35 Units at 08/21/21 1147    lidocaine 1 % injection 5 mL  5 mL Intradermal Once Marquis Philippe MD        sodium chloride flush 0.9 % injection 5-40 mL  5-40 mL Intravenous 2 times per day Richard Osborne MD        sodium chloride flush 0.9 % injection 5-40 mL  5-40 mL Intravenous PRN Marquis Philippe MD        0.9 % sodium chloride infusion  25 mL Intravenous PRN Marquis Philippe MD        heparin flush 100 UNIT/ML injection 100 Units  1 mL Intravenous 2 times per day Marquis Philippe MD        heparin flush 100 UNIT/ML injection 100 Units  1 mL Intracatheter PRN Marquis Philippe MD        sodium chloride tablet 1 g  1 g Oral TID  Mayda Brown MD        calcium carbonate (TUMS) chewable tablet 500 mg  500 mg Oral TID PRN Morley Dandy V, MD   500 mg at 08/20/21 1730    pantoprazole (PROTONIX) tablet 40 mg  40 mg Oral BID AC Milan George, DO   40 mg at 08/21/21 5163    apixaban (ELIQUIS) tablet 5 mg  5 mg Oral BID Milan George, DO   5 mg at 08/21/21 1838    [Held by provider] bumetanide (BUMEX) tablet 2 mg  2 mg Oral BID Milan George, DO   2 mg at 08/19/21 0931    escitalopram (LEXAPRO) tablet 20 mg  20 mg Oral Daily Milan Clause, DO   20 mg at 08/21/21 0840    ezetimibe (ZETIA) tablet 10 mg  10 mg Oral Nightly Elisann Clause, DO   10 mg at 08/20/21 2119    ferrous sulfate (IRON 325) tablet 325 mg  325 mg Oral Daily with breakfast Milan George, DO   325 mg at 08/21/21 0839    gabapentin (NEURONTIN) capsule 600 mg  600 mg Oral TID Gerre Sindi, DO   600 mg at 08/21/21 6856    metoprolol succinate (TOPROL XL) extended release tablet 100 mg  100 mg Oral BID Lillyeen Common, DO   100 mg at 08/21/21 7096    spironolactone (ALDACTONE) tablet 25 mg  25 mg Oral Daily Gerre Sindi, DO   25 mg at 08/21/21 8845    traZODone (DESYREL) tablet 50 mg  50 mg Oral Nightly Gerre Sindi, DO   50 mg at 08/20/21 2119    [START ON 8/23/2021] vitamin D (ERGOCALCIFEROL) capsule 50,000 Units  50,000 Units Oral Weekly Gerre Sindi, DO        promethazine (PHENERGAN) tablet 12.5 mg  12.5 mg Oral Q6H PRN Gerre Sindi, DO        Or    ondansetron [facility]) injection 4 mg  4 mg Intravenous Q6H PRN Gerre Sindi, DO        polyethylene glycol (GLYCOLAX) packet 17 g  17 g Oral Daily PRN Gerre Sindi, DO        acetaminophen (TYLENOL) tablet 650 mg  650 mg Oral Q6H PRN Gerre Sindi, DO   650 mg at 08/21/21 1125    Or    acetaminophen (TYLENOL) suppository 650 mg  650 mg Rectal Q6H PRN Gerre Sindi, DO        0.9 % sodium chloride infusion   Intravenous Continuous Gerre Sindi, DO 75 mL/hr at 08/20/21 2248 New Bag at 08/20/21 2248    glucose (GLUTOSE) 40 % oral gel 15 g  15 g Oral PRN Gerre Sindi, DO        dextrose 50 % IV solution  12.5 g Intravenous PRN Gerre Sindi, DO        glucagon (rDNA) injection 1 mg  1 mg Intramuscular PRN Gerre Sindi, DO        dextrose 5 % solution  100 mL/hr Intravenous PRN Gerre Sindi, DO        insulin lispro (HUMALOG) injection vial 0-12 Units  0-12 Units Subcutaneous TID WC Gerre Sindi, DO   10 Units at 08/21/21 1140    insulin lispro (HUMALOG) injection vial 0-6 Units  0-6 Units Subcutaneous Nightly Gerre Sindi, DO   4 Units at 08/21/21 0036    meropenem (MERREM) 1,000 mg in sodium chloride 0.9 % 100 mL IVPB (mini-bag)  1,000 mg Intravenous Q8H Marquis Philippe MD 33.3 mL/hr at 08/21/21 1101 1,000 mg at 08/21/21 1101    Post meropenem saline flush  33 mL Intravenous Q8H Marquis P Jose Martin, MD        ipratropium-albuterol (DUONEB) nebulizer solution 1 ampule  1 ampule Inhalation Q4H WA Jarek CANNON MD   1 ampule at 08/21/21 1212    dilTIAZem 100 mg in dextrose 5 % 100 mL infusion ([facility])  5-15 mg/hr Intravenous Continuous Jackqulyn Peon, DO   Held at 08/19/21 1710    dilTIAZem (CARDIZEM CD) extended release capsule 240 mg  240 mg Oral BID Jackqulyn Peon, DO   240 mg at 08/21/21 2615         REVIEW OF SYSTEMS:    CONSTITUTIONAL:  Denies fever or chills   HEENT: denies blurring of vision or double vision, denies hearing problem  RESPIRATORY: SOB . CARDIOVASCULAR:  Denies palpitation  GASTROINTESTINAL: Nausea, vomiting, diarrhea   GENITOURINARY:  Denies burning urination or frequency of urination  INTEGUMENT: Left leg redness   HEMATOLOGIC/LYMPHATIC:  Denies lymph node swelling   MUSCULOSKELETAL: Bilateral leg swelling, left leg erythema   NEUROLOGICAL:  Weakness     PHYSICAL EXAM:      Vitals:     /62   Pulse 75   Temp 97 °F (36.1 °C) (Temporal)   Resp 18   Ht 5' 3\" (1.6 m)   Wt (!) 305 lb 5.4 oz (138.5 kg)   LMP  (LMP Unknown)   SpO2 97%   BMI 54.09 kg/m²     General Appearance:    Awake, alert , no acute distress, morbidly obese    Head:    Normocephalic, atraumatic   Eyes:    No pallor, no icterus,   Ears:    No obvious deformity or drainage.    Nose:   No nasal drainage   Throat:   Mucosa moist, no oral thrush   Neck:   Supple, no lymphadenopathy   Lungs:     Clear to auscultation bilaterally, no wheeze    Heart:    Regular rate and rhythm, no murmur   Abdomen:     Soft, non-tender, bowel sounds present    Extremities:   Bilateral pitting edema, left leg erythema, tender, warm to touch    Pulses:   Dorsalis pedis not palpable    Skin:   Left leg erythema      CBC with Differential:      Lab Results   Component Value Date    WBC 9.1 08/21/2021    RBC 3.62 08/21/2021    HGB 8.9 08/21/2021    HCT 28.7 08/21/2021     08/21/2021    MCV 79.3 08/21/2021    MCH 24.6 08/21/2021    MCHC 31.0 08/21/2021    RDW 17.2 08/21/2021    NRBC 1.7 08/21/2021    SEGSPCT 85 01/29/2014    METASPCT 0.9 08/20/2021    LYMPHOPCT 15.7 08/21/2021    PROMYELOPCT 2.6 08/21/2021    MONOPCT 11.3 08/21/2021    MYELOPCT 0.9 08/21/2021    BASOPCT 0.3 08/21/2021    MONOSABS 1.00 08/21/2021    LYMPHSABS 1.46 08/21/2021    EOSABS 0.00 08/21/2021    BASOSABS 0.00 08/21/2021       CMP     Lab Results   Component Value Date     08/21/2021    K 4.6 08/21/2021    K 4.6 08/21/2021    CL 91 08/21/2021    CO2 24 08/21/2021    BUN 61 08/21/2021    CREATININE 1.4 08/21/2021    GFRAA 45 08/21/2021    LABGLOM 37 08/21/2021    GLUCOSE 375 08/21/2021    GLUCOSE 181 12/16/2011    PROT 5.8 08/21/2021    LABALBU 2.3 08/21/2021    LABALBU 4.5 11/02/2011    CALCIUM 8.4 08/21/2021    BILITOT 0.4 08/21/2021    ALKPHOS 150 08/21/2021    AST 41 08/21/2021    ALT 30 08/21/2021         Hepatic Function Panel:    Lab Results   Component Value Date    ALKPHOS 150 08/21/2021    ALT 30 08/21/2021    AST 41 08/21/2021    PROT 5.8 08/21/2021    BILITOT 0.4 08/21/2021    BILIDIR <0.2 12/26/2019    IBILI see below 12/26/2019    LABALBU 2.3 08/21/2021    LABALBU 4.5 11/02/2011       PT/INR:    Lab Results   Component Value Date    PROTIME 19.5 04/29/2021    INR 1.8 04/29/2021       TSH:    Lab Results   Component Value Date    TSH 1.890 08/18/2021       U/A:    Lab Results   Component Value Date    NITRITE negative 07/26/2018    COLORU Yellow 08/18/2021    PHUR 5.0 08/18/2021    WBCUA NONE 08/18/2021    RBCUA 1-3 08/18/2021    YEAST Present 05/28/2020    BACTERIA MANY 08/18/2021    CLARITYU Clear 08/18/2021    SPECGRAV 1.025 08/18/2021    LEUKOCYTESUR Negative 08/18/2021    UROBILINOGEN 1.0 08/18/2021    BILIRUBINUR SMALL 08/18/2021    BILIRUBINUR negative 07/26/2018    BLOODU Negative 08/18/2021    GLUCOSEU Negative 08/18/2021    AMORPHOUS FEW 08/14/2020       ABG:  No results found for: LAM6EMD, BEART, J8YYFWEK, PHART, THGBART, DHN5HLX, PO2ART, JWT4YKC    MICROBIOLOGY:    Blood culture -  Culture, Blood 2 [[id number]] (Abnormal)    Collected: 08/18/21 1323    Updated: 08/21/21 1112    Specimen Source: Blood     Organism Shewanella algaeAbnormal     Culture, Blood 2 --    Validated susceptibility testing methods do not exist for   this isolate. Narrative:     CALL  [facility]  A77 tel. ,   Microbiology results called to and read back by Madison Courtney RN, 08/19/2021   09:04, by [facility]   Culture, Blood 1 [[id number]] (Abnormal)    Collected: 08/18/21 1323    Updated: 08/21/21 1111    Specimen Source: Blood     Organism Shewanella algaeAbnormal     Blood Culture, Routine --    Validated susceptibility testing methods do not exist for   this isolate. Narrative:       Radiology :    CT abdomen and pelvis -   Impression:       Focal 4.4 cm right adrenal gland lesion is again demonstrated.  This is   increased in size when compared to June 2006.  Surgical consultation   recommended if patient has not had prior surgical workup. Nonspecific mild prominence of retroperitoneal lymphadenopathy adjacent to   the left common iliac artery.              IMPRESSION:     1. Shewanella bacteremia likely sec to #2   2. Left leg cellulitis   3. Morbid obesity, chronic lower extremity lymphadenoma     RECOMMENDATIONS:      1. Meropenem 1 gram IV q 8 hrs   2. Leg elevation, diuretics   3.  Midline insertion

## 2021-08-22 LAB
ALBUMIN SERPL-MCNC: 2.4 G/DL (ref 3.5–5.2)
ALP BLD-CCNC: 149 U/L (ref 35–104)
ALT SERPL-CCNC: 28 U/L (ref 0–32)
ANION GAP SERPL CALCULATED.3IONS-SCNC: 11 MMOL/L (ref 7–16)
ANISOCYTOSIS: ABNORMAL
AST SERPL-CCNC: 27 U/L (ref 0–31)
BASOPHILS ABSOLUTE: 0 E9/L (ref 0–0.2)
BASOPHILS RELATIVE PERCENT: 0 % (ref 0–2)
BILIRUB SERPL-MCNC: 0.3 MG/DL (ref 0–1.2)
BUN BLDV-MCNC: 66 MG/DL (ref 6–23)
CALCIUM SERPL-MCNC: 8.5 MG/DL (ref 8.6–10.2)
CHLORIDE BLD-SCNC: 96 MMOL/L (ref 98–107)
CO2: 23 MMOL/L (ref 22–29)
CORTISOL TOTAL: 21.92 MCG/DL (ref 2.68–18.4)
CREAT SERPL-MCNC: 1.3 MG/DL (ref 0.5–1)
EOSINOPHILS ABSOLUTE: 0.19 E9/L (ref 0.05–0.5)
EOSINOPHILS RELATIVE PERCENT: 2 % (ref 0–6)
GFR AFRICAN AMERICAN: 49
GFR NON-AFRICAN AMERICAN: 41 ML/MIN/1.73
GLUCOSE BLD-MCNC: 197 MG/DL (ref 74–99)
HCT VFR BLD CALC: 29.3 % (ref 34–48)
HEMOGLOBIN: 9.1 G/DL (ref 11.5–15.5)
LYMPHOCYTES ABSOLUTE: 1.63 E9/L (ref 1.5–4)
LYMPHOCYTES RELATIVE PERCENT: 17 % (ref 20–42)
MCH RBC QN AUTO: 24.5 PG (ref 26–35)
MCHC RBC AUTO-ENTMCNC: 31.1 % (ref 32–34.5)
MCV RBC AUTO: 79 FL (ref 80–99.9)
METAMYELOCYTES RELATIVE PERCENT: 2 % (ref 0–1)
METER GLUCOSE: 130 MG/DL (ref 74–99)
METER GLUCOSE: 160 MG/DL (ref 74–99)
METER GLUCOSE: 224 MG/DL (ref 74–99)
METER GLUCOSE: 48 MG/DL (ref 74–99)
METER GLUCOSE: 94 MG/DL (ref 74–99)
MONOCYTES ABSOLUTE: 0.38 E9/L (ref 0.1–0.95)
MONOCYTES RELATIVE PERCENT: 4 % (ref 2–12)
MYELOCYTE PERCENT: 1 % (ref 0–0)
NEUTROPHILS ABSOLUTE: 7.39 E9/L (ref 1.8–7.3)
NEUTROPHILS RELATIVE PERCENT: 74 % (ref 43–80)
OVALOCYTES: ABNORMAL
PDW BLD-RTO: 17.3 FL (ref 11.5–15)
PLATELET # BLD: 337 E9/L (ref 130–450)
PMV BLD AUTO: 10.4 FL (ref 7–12)
POIKILOCYTES: ABNORMAL
POLYCHROMASIA: ABNORMAL
POTASSIUM REFLEX MAGNESIUM: 4.9 MMOL/L (ref 3.5–5)
POTASSIUM SERPL-SCNC: 4.9 MMOL/L (ref 3.5–5)
RBC # BLD: 3.71 E12/L (ref 3.5–5.5)
SODIUM BLD-SCNC: 130 MMOL/L (ref 132–146)
TOTAL PROTEIN: 5.8 G/DL (ref 6.4–8.3)
WBC # BLD: 9.6 E9/L (ref 4.5–11.5)

## 2021-08-22 PROCEDURE — 82088 ASSAY OF ALDOSTERONE: CPT

## 2021-08-22 PROCEDURE — 82533 TOTAL CORTISOL: CPT

## 2021-08-22 PROCEDURE — 82384 ASSAY THREE CATECHOLAMINES: CPT

## 2021-08-22 PROCEDURE — 97530 THERAPEUTIC ACTIVITIES: CPT

## 2021-08-22 PROCEDURE — 6370000000 HC RX 637 (ALT 250 FOR IP): Performed by: INTERNAL MEDICINE

## 2021-08-22 PROCEDURE — 2580000003 HC RX 258: Performed by: INTERNAL MEDICINE

## 2021-08-22 PROCEDURE — 36415 COLL VENOUS BLD VENIPUNCTURE: CPT

## 2021-08-22 PROCEDURE — 1200000000 HC SEMI PRIVATE

## 2021-08-22 PROCEDURE — 80053 COMPREHEN METABOLIC PANEL: CPT

## 2021-08-22 PROCEDURE — 6370000000 HC RX 637 (ALT 250 FOR IP): Performed by: FAMILY MEDICINE

## 2021-08-22 PROCEDURE — 2580000003 HC RX 258: Performed by: FAMILY MEDICINE

## 2021-08-22 PROCEDURE — 82024 ASSAY OF ACTH: CPT

## 2021-08-22 PROCEDURE — 2700000000 HC OXYGEN THERAPY PER DAY

## 2021-08-22 PROCEDURE — 85025 COMPLETE CBC W/AUTO DIFF WBC: CPT

## 2021-08-22 PROCEDURE — 82962 GLUCOSE BLOOD TEST: CPT

## 2021-08-22 PROCEDURE — 6360000002 HC RX W HCPCS: Performed by: INTERNAL MEDICINE

## 2021-08-22 PROCEDURE — 97535 SELF CARE MNGMENT TRAINING: CPT

## 2021-08-22 PROCEDURE — 83835 ASSAY OF METANEPHRINES: CPT

## 2021-08-22 PROCEDURE — 94640 AIRWAY INHALATION TREATMENT: CPT

## 2021-08-22 PROCEDURE — 84244 ASSAY OF RENIN: CPT

## 2021-08-22 RX ORDER — DOCUSATE SODIUM 100 MG/1
100 CAPSULE, LIQUID FILLED ORAL 2 TIMES DAILY PRN
Status: DISCONTINUED | OUTPATIENT
Start: 2021-08-22 | End: 2021-08-23 | Stop reason: HOSPADM

## 2021-08-22 RX ADMIN — IPRATROPIUM BROMIDE AND ALBUTEROL SULFATE 1 AMPULE: .5; 2.5 SOLUTION RESPIRATORY (INHALATION) at 21:56

## 2021-08-22 RX ADMIN — DILTIAZEM HYDROCHLORIDE 240 MG: 120 CAPSULE, COATED, EXTENDED RELEASE ORAL at 08:16

## 2021-08-22 RX ADMIN — METOPROLOL SUCCINATE 100 MG: 100 TABLET, EXTENDED RELEASE ORAL at 08:15

## 2021-08-22 RX ADMIN — INSULIN LISPRO 35 UNITS: 100 INJECTION, SOLUTION INTRAVENOUS; SUBCUTANEOUS at 17:52

## 2021-08-22 RX ADMIN — INSULIN LISPRO 35 UNITS: 100 INJECTION, SOLUTION INTRAVENOUS; SUBCUTANEOUS at 08:11

## 2021-08-22 RX ADMIN — SODIUM CHLORIDE: 9 INJECTION, SOLUTION INTRAVENOUS at 15:49

## 2021-08-22 RX ADMIN — APIXABAN 5 MG: 5 TABLET, FILM COATED ORAL at 08:15

## 2021-08-22 RX ADMIN — IPRATROPIUM BROMIDE AND ALBUTEROL SULFATE 1 AMPULE: .5; 2.5 SOLUTION RESPIRATORY (INHALATION) at 13:18

## 2021-08-22 RX ADMIN — DILTIAZEM HYDROCHLORIDE 240 MG: 120 CAPSULE, COATED, EXTENDED RELEASE ORAL at 20:58

## 2021-08-22 RX ADMIN — SODIUM CHLORIDE: 9 INJECTION, SOLUTION INTRAVENOUS at 02:08

## 2021-08-22 RX ADMIN — ACETAMINOPHEN 650 MG: 325 TABLET ORAL at 18:17

## 2021-08-22 RX ADMIN — SPIRONOLACTONE 25 MG: 25 TABLET ORAL at 08:17

## 2021-08-22 RX ADMIN — Medication 1 G: at 12:18

## 2021-08-22 RX ADMIN — SODIUM CHLORIDE 33.3 ML: 9 INJECTION, SOLUTION INTRAVENOUS at 13:37

## 2021-08-22 RX ADMIN — INSULIN GLARGINE 45 UNITS: 100 INJECTION, SOLUTION SUBCUTANEOUS at 08:11

## 2021-08-22 RX ADMIN — PANTOPRAZOLE SODIUM 40 MG: 40 TABLET, DELAYED RELEASE ORAL at 17:52

## 2021-08-22 RX ADMIN — EZETIMIBE 10 MG: 10 TABLET ORAL at 20:58

## 2021-08-22 RX ADMIN — ACETAMINOPHEN 650 MG: 325 TABLET ORAL at 06:15

## 2021-08-22 RX ADMIN — IPRATROPIUM BROMIDE AND ALBUTEROL SULFATE 1 AMPULE: .5; 2.5 SOLUTION RESPIRATORY (INHALATION) at 16:50

## 2021-08-22 RX ADMIN — HEPARIN 100 UNITS: 100 SYRINGE at 20:54

## 2021-08-22 RX ADMIN — INSULIN LISPRO 2 UNITS: 100 INJECTION, SOLUTION INTRAVENOUS; SUBCUTANEOUS at 12:17

## 2021-08-22 RX ADMIN — ACETAMINOPHEN 650 MG: 325 TABLET ORAL at 12:18

## 2021-08-22 RX ADMIN — SODIUM CHLORIDE, PRESERVATIVE FREE 10 ML: 5 INJECTION INTRAVENOUS at 08:15

## 2021-08-22 RX ADMIN — GABAPENTIN 600 MG: 300 CAPSULE ORAL at 13:37

## 2021-08-22 RX ADMIN — FERROUS SULFATE TAB 325 MG (65 MG ELEMENTAL FE) 325 MG: 325 (65 FE) TAB at 08:15

## 2021-08-22 RX ADMIN — APIXABAN 5 MG: 5 TABLET, FILM COATED ORAL at 21:00

## 2021-08-22 RX ADMIN — INSULIN LISPRO 4 UNITS: 100 INJECTION, SOLUTION INTRAVENOUS; SUBCUTANEOUS at 08:10

## 2021-08-22 RX ADMIN — MEROPENEM 1000 MG: 1 INJECTION, POWDER, FOR SOLUTION INTRAVENOUS at 17:52

## 2021-08-22 RX ADMIN — GABAPENTIN 600 MG: 300 CAPSULE ORAL at 20:58

## 2021-08-22 RX ADMIN — ESCITALOPRAM 20 MG: 10 TABLET, FILM COATED ORAL at 08:16

## 2021-08-22 RX ADMIN — MEROPENEM 1000 MG: 1 INJECTION, POWDER, FOR SOLUTION INTRAVENOUS at 10:19

## 2021-08-22 RX ADMIN — METOPROLOL SUCCINATE 100 MG: 100 TABLET, EXTENDED RELEASE ORAL at 20:58

## 2021-08-22 RX ADMIN — INSULIN LISPRO 35 UNITS: 100 INJECTION, SOLUTION INTRAVENOUS; SUBCUTANEOUS at 12:17

## 2021-08-22 RX ADMIN — ACETAMINOPHEN 650 MG: 325 TABLET ORAL at 00:22

## 2021-08-22 RX ADMIN — MEROPENEM 1000 MG: 1 INJECTION, POWDER, FOR SOLUTION INTRAVENOUS at 02:13

## 2021-08-22 RX ADMIN — TRAZODONE HYDROCHLORIDE 50 MG: 50 TABLET ORAL at 20:58

## 2021-08-22 RX ADMIN — GABAPENTIN 600 MG: 300 CAPSULE ORAL at 08:15

## 2021-08-22 RX ADMIN — Medication 1 G: at 08:15

## 2021-08-22 RX ADMIN — IPRATROPIUM BROMIDE AND ALBUTEROL SULFATE 1 AMPULE: .5; 2.5 SOLUTION RESPIRATORY (INHALATION) at 09:25

## 2021-08-22 RX ADMIN — Medication 1 G: at 17:52

## 2021-08-22 RX ADMIN — EZETIMIBE 10 MG: 10 TABLET ORAL at 00:22

## 2021-08-22 RX ADMIN — PANTOPRAZOLE SODIUM 40 MG: 40 TABLET, DELAYED RELEASE ORAL at 06:12

## 2021-08-22 ASSESSMENT — PAIN DESCRIPTION - DESCRIPTORS
DESCRIPTORS: ACHING;CONSTANT;DISCOMFORT

## 2021-08-22 ASSESSMENT — PAIN DESCRIPTION - PROGRESSION
CLINICAL_PROGRESSION: NOT CHANGED

## 2021-08-22 ASSESSMENT — PAIN SCALES - GENERAL
PAINLEVEL_OUTOF10: 0
PAINLEVEL_OUTOF10: 9
PAINLEVEL_OUTOF10: 0
PAINLEVEL_OUTOF10: 5
PAINLEVEL_OUTOF10: 7
PAINLEVEL_OUTOF10: 9
PAINLEVEL_OUTOF10: 5
PAINLEVEL_OUTOF10: 5
PAINLEVEL_OUTOF10: 0
PAINLEVEL_OUTOF10: 3
PAINLEVEL_OUTOF10: 0
PAINLEVEL_OUTOF10: 0

## 2021-08-22 ASSESSMENT — PAIN DESCRIPTION - ORIENTATION
ORIENTATION: LEFT
ORIENTATION: LEFT;RIGHT
ORIENTATION: LEFT;RIGHT
ORIENTATION: LEFT

## 2021-08-22 ASSESSMENT — PAIN DESCRIPTION - PAIN TYPE
TYPE: ACUTE PAIN

## 2021-08-22 ASSESSMENT — PAIN DESCRIPTION - FREQUENCY
FREQUENCY: INTERMITTENT

## 2021-08-22 ASSESSMENT — PAIN DESCRIPTION - ONSET
ONSET: ON-GOING
ONSET: PROGRESSIVE

## 2021-08-22 ASSESSMENT — PAIN - FUNCTIONAL ASSESSMENT
PAIN_FUNCTIONAL_ASSESSMENT: PREVENTS OR INTERFERES SOME ACTIVE ACTIVITIES AND ADLS

## 2021-08-22 ASSESSMENT — PAIN DESCRIPTION - LOCATION
LOCATION: FOOT

## 2021-08-22 NOTE — PROGRESS NOTES
Physical Therapy  Physical Therapy     Name: Randy Andersen  : 1955  MRN: [id number]      Date of Service: 2021    Evaluating PT:  Giacomo Smith, PT, DPT [id number]     Room #:  4788/9379-L  Diagnosis:  Renal lesion [N28.9]  Sepsis ([facility] [address] [A41.9]  Reason for admission: abdominal pain  Precautions:  Falls, contact iso  Procedure/Surgery:  none  Equipment Recommendations:  none    SUBJECTIVE:  Pt lives with two grandsons ( in a nursing home currently) in a 2 story home, 1st floor setup. Currently sleeping on couch d/t lift chair being broken. Ambulating with use of rollator. OBJECTIVE:   Initial Evaluation  Date:  Treatment    Short Term/ Long Term   Goals   AM-PAC 6 Clicks 32 77/62    Was pt agreeable to Eval/treatment? Yes  yes    Does pt have pain? BLEs to light touch  B LEs    Bed Mobility  Rolling: NT  Supine to sit: MaxA x2  Sit to supine: MaxA x2  Scooting: MaxA x2 Sit to supine ModA  Scooting Giuseppe ModA   Transfers Sit to stand: ModA  Stand to sit: ModA  Stand pivot: Giuseppe with [employer] Sit to stand ModA   Stand to sit ModA SBA   Ambulation    3 feet with [employer] Giuseppe   2 feet x1 with ww Giuseppe >15 feet with [employer] SBA   Stair negotiation: ascended and descended  NT N/T TBD   ROM BUE:  See OT eval   BLE:  WFL     Strength BUE:  See OT eval   BLE:  knee ext 5/5  Ankle DF 5/5  Increase by 1/3 MMT grade    Balance Sitting EOB:  Independent  Dynamic Standing:  Giuseppe Sitting EOB SBA  Dynamic standing with ww Giuseppe Sitting EOB:  indep  Dynamic Standing:  SBA     -Pt is A & O x 3  -Sensation:  Pt denies numbness and tingling to extremities  -Edema:  unremarkable     Therapeutic Exercises:  LAQS 2x10, sit <> stand x3 reps.     Patient education  Pt educated on safety, sequencing of transfers, and role of PT    Patient response to education:   Pt verbalized understanding Pt demonstrated skill Pt requires further education in this area   Yes  Partial  Yes      ASSESSMENT:  Conditions Requiring Skilled Therapeutic Intervention:  [x]Decreased strength     []Decreased ROM  [x]Decreased functional mobility  [x]Decreased balance   [x]Decreased endurance   [x]Decreased posture  []Decreased sensation  [x]Decreased coordination   []Decreased vision  [x]Decreased safety awareness   [x]Increased pain       Comments:  Pt received supine in bed and agreeable to PT session   Pt cleared by nurse prior to tx session. Pt required decreased assistance for bed mobility. Pt stood to ww but required B LEs to be blocked so pt's feet did not slide forward. Pt amb short distance Abbie. Pt on O2 throughout tx serssion. Pt returned to bed and repositioned. Pt given call light. Pt with all needs met and call light in reach. Pt would benefit from continued PT POC to address functional deficits described above. Treatment:  Patient practiced and was instructed in the following treatment:     Patient education provided continuously throughout session for sequencing, safety maintenance, and improving any deficits found during the evaluation.  Bed mobility training - pt given verbal and tactile cues to facilitate proper sequencing and safety during rolling and supine>sit as well as provided with physical assistance to complete task     Sitting EOB for >7 minutes for upright tolerance, postural awareness and BLE ROM   STS  - pt educated on proper hand and foot placement, safety and sequencing, and use of WW to safely complete sit<>stand and pivot transfers with hands on assistance to complete task safely. Pt's/ family goals   1. Return home    Patient and or family understand(s) diagnosis, prognosis, and plan of care. yes    Prognosis is fair for reaching above PT goals.     PHYSICAL THERAPY PLAN OF CARE:    PT POC is established based on physician order and patient diagnosis     Referring provider/PT Order:    08/19/21 1445  PT eval and treat     Eri Moreno MD     Diagnosis:  Renal lesion [N28.9]  Sepsis ([facility] [address] [A41.9]  Specific instructions for next treatment:  Progress transfers and ambulation. oob in chair    Current Treatment Recommendations:   [x] Strengthening to improve independence with functional mobility   [] ROM to improve independence with functional mobility   [x] Balance Training to improve static/dynamic balance and to reduce fall risk  [x] Endurance Training to improve activity tolerance during functional mobility   [x] Transfer Training to improve safety and independence with all functional transfers   [x] Gait Training to improve gait mechanics, endurance and asses need for appropriate assistive device  [] Stair Training in preparation for safe discharge home and/or into the community   [x] Positioning to prevent skin breakdown and contractures  [x] Safety and Education Training   [] Patient/Caregiver Education   [] HEP  [] Other     PT long term treatment goals are located in above grid    Frequency of treatments: 2-5x/week x 1-2 weeks.     Time in 11:35  Time out  11:50    Total Treatment Time  15 minutes         CPT codes:  [] Low Complexity PT evaluation 17254  [] Moderate Complexity PT evaluation 66466  [] High Complexity PT evaluation 17398  [] PT Re-evaluation 27043  [] Gait training 51066 - minutes  [] Manual therapy 87247 - minutes  [x] Therapeutic activities 54682 15 minutes  [] Therapeutic exercises 61452 - minutes  [] Neuromuscular reeducation 10544 - minutes     Eric Driscoll [id number]

## 2021-08-22 NOTE — PROGRESS NOTES
Department of Internal Medicine  Infectious Diseases   Progress Note      C/C : Shewanella bacteremia, left leg cellulitis     Denies fever or chills  Reports leg pain, swelling   Afebrile     Current Facility-Administered Medications   Medication Dose Route Frequency Provider Last Rate Last Admin    docusate sodium (COLACE) capsule 100 mg  100 mg Oral BID PRN Matt Conklin MD        insulin glargine (LANTUS) injection vial 45 Units  45 Units Subcutaneous BID Deejay Rutledge MD   45 Units at 08/22/21 0811    insulin lispro (HUMALOG) injection vial 35 Units  35 Units Subcutaneous TID  Deejay Rutledge MD   35 Units at 08/22/21 1217    lidocaine 1 % injection 5 mL  5 mL Intradermal Once Marquis Philippe MD        sodium chloride flush 0.9 % injection 5-40 mL  5-40 mL Intravenous 2 times per day Jobie Spatz, MD   10 mL at 08/22/21 0815    sodium chloride flush 0.9 % injection 5-40 mL  5-40 mL Intravenous PRN Marquis Philippe MD        0.9 % sodium chloride infusion  25 mL Intravenous PRN Marquis Philippe MD        heparin flush 100 UNIT/ML injection 100 Units  1 mL Intravenous 2 times per day Gilberto Philippe MD   100 Units at 08/21/21 2251    heparin flush 100 UNIT/ML injection 100 Units  1 mL Intracatheter PRN Marquis Philippe MD        sodium chloride tablet 1 g  1 g Oral TID  Justa CANNON MD   1 g at 08/22/21 1218    calcium carbonate (TUMS) chewable tablet 500 mg  500 mg Oral TID PRN Deejay Rutledge MD   500 mg at 08/20/21 1730    pantoprazole (PROTONIX) tablet 40 mg  40 mg Oral BID AC Rula Ham, DO   40 mg at 08/22/21 0612    apixaban (ELIQUIS) tablet 5 mg  5 mg Oral BID Rula Ham, DO   5 mg at 08/22/21 0815    [Held by provider] bumetanide (BUMEX) tablet 2 mg  2 mg Oral BID Rula Ham, DO   2 mg at 08/19/21 0931    escitalopram (LEXAPRO) tablet 20 mg  20 mg Oral Daily Rula Ham, DO   20 mg at 08/22/21 0816    ezetimibe (ZETIA) tablet 10 mg  10 mg Oral Nightly Rula Ham, DO 10 mg at 08/22/21 0022    ferrous sulfate (IRON 325) tablet 325 mg  325 mg Oral Daily with breakfast Alban Hashimoto, DO   325 mg at 08/22/21 0815    gabapentin (NEURONTIN) capsule 600 mg  600 mg Oral TID Alban Hashimoto, DO   600 mg at 08/22/21 1337    metoprolol succinate (TOPROL XL) extended release tablet 100 mg  100 mg Oral BID Donnallui Gentle, DO   100 mg at 08/22/21 0815    spironolactone (ALDACTONE) tablet 25 mg  25 mg Oral Daily Alban Hashimoto, DO   25 mg at 08/22/21 4099    traZODone (DESYREL) tablet 50 mg  50 mg Oral Nightly Alban Hashimoto, DO   50 mg at 08/21/21 2149    [START ON 8/23/2021] vitamin D (ERGOCALCIFEROL) capsule 50,000 Units  50,000 Units Oral Weekly Alban Hashimoto, DO        promethazine (PHENERGAN) tablet 12.5 mg  12.5 mg Oral Q6H PRN Alban Hashimoto, DO        Or    ondansetron [facility]) injection 4 mg  4 mg Intravenous Q6H PRN Alban Hashimoto, DO        polyethylene glycol (GLYCOLAX) packet 17 g  17 g Oral Daily PRN Alban Hashimoto, DO        acetaminophen (TYLENOL) tablet 650 mg  650 mg Oral Q6H PRN Alban Hashimoto, DO   650 mg at 08/22/21 1218    Or    acetaminophen (TYLENOL) suppository 650 mg  650 mg Rectal Q6H PRN Alban Hashimoto, DO        0.9 % sodium chloride infusion   Intravenous Continuous Alban Hashimoto, DO 75 mL/hr at 08/22/21 0755 Rate Verify at 08/22/21 0755    glucose (GLUTOSE) 40 % oral gel 15 g  15 g Oral PRN Alban Hashimoto, DO        dextrose 50 % IV solution  12.5 g Intravenous PRN Alban Hashimoto, DO        glucagon (rDNA) injection 1 mg  1 mg Intramuscular PRN Alban Hashimoto, DO        dextrose 5 % solution  100 mL/hr Intravenous PRN Alban Hashimoto, DO        insulin lispro (HUMALOG) injection vial 0-12 Units  0-12 Units Subcutaneous TID WC Alban Hashimoto, DO   2 Units at 08/22/21 1217    insulin lispro (HUMALOG) injection vial 0-6 Units  0-6 Units Subcutaneous Nightly Alban Hashimoto, DO   1 Units at 08/21/21 4949    meropenem (MERREM) 1,000 mg in sodium chloride 0.9 % 100 mL IVPB (mini-bag)  1,000 mg Intravenous Q8H Marquis Philippe MD   Stopped at 08/22/21 1336    Post meropenem saline flush  33 mL Intravenous Q8H Marquis Philippe MD 33.3 mL/hr at 08/22/21 1337 33.3 mL at 08/22/21 1337    ipratropium-albuterol (DUONEB) nebulizer solution 1 ampule  1 ampule Inhalation Q4H ALEE CANNON MD   1 ampule at 08/22/21 1318    dilTIAZem 100 mg in dextrose 5 % 100 mL infusion (ADD-[place])  5-15 mg/hr Intravenous Continuous Kendelljethro Villagomezk, DO   Held at 08/19/21 1710    dilTIAZem (CARDIZEM CD) extended release capsule 240 mg  240 mg Oral BID Kendell Jairon, DO   240 mg at 08/22/21 2176         REVIEW OF SYSTEMS:    CONSTITUTIONAL:  Denies fever or chills   HEENT: denies blurring of vision or double vision, denies hearing problem  RESPIRATORY: SOB . CARDIOVASCULAR:  Denies palpitation  GASTROINTESTINAL: Nausea, vomiting, diarrhea   GENITOURINARY:  Denies burning urination or frequency of urination  INTEGUMENT: Left leg redness   HEMATOLOGIC/LYMPHATIC:  Denies lymph node swelling   MUSCULOSKELETAL: Bilateral leg swelling, left leg erythema   NEUROLOGICAL:  Weakness     PHYSICAL EXAM:      Vitals:     /66   Pulse 74   Temp 97.5 °F (36.4 °C) (Temporal)   Resp 18   Ht 5' 3\" (1.6 m)   Wt (!) 305 lb 5.4 oz (138.5 kg)   LMP  (LMP Unknown)   SpO2 96%   BMI 54.09 kg/m²     General Appearance:    Awake, alert , no acute distress, morbidly obese    Head:    Normocephalic, atraumatic   Eyes:    No pallor, no icterus,   Ears:    No obvious deformity or drainage.    Nose:   No nasal drainage   Throat:   Mucosa moist, no oral thrush   Neck:   Supple, no lymphadenopathy   Lungs:     Clear to auscultation bilaterally, no wheeze    Heart:    Regular rate and rhythm, no murmur   Abdomen:     Soft, non-tender, bowel sounds present    Extremities:   Bilateral pitting edema, left leg erythema,non tender    Pulses:   Dorsalis pedis not palpable    Skin:   Left leg erythema      CBC with Differential:      Lab Results   Component Value Date    WBC 9.6 08/22/2021    RBC 3.71 08/22/2021    HGB 9.1 08/22/2021    HCT 29.3 08/22/2021     08/22/2021    MCV 79.0 08/22/2021    MCH 24.5 08/22/2021    MCHC 31.1 08/22/2021    RDW 17.3 08/22/2021    NRBC 1.7 08/21/2021    SEGSPCT 85 01/29/2014    METASPCT 2.0 08/22/2021    LYMPHOPCT 17.0 08/22/2021    PROMYELOPCT 2.6 08/21/2021    MONOPCT 4.0 08/22/2021    MYELOPCT 1.0 08/22/2021    BASOPCT 0.0 08/22/2021    MONOSABS 0.38 08/22/2021    LYMPHSABS 1.63 08/22/2021    EOSABS 0.19 08/22/2021    BASOSABS 0.00 08/22/2021       CMP     Lab Results   Component Value Date     08/22/2021    K 4.9 08/22/2021    K 4.9 08/22/2021    CL 96 08/22/2021    CO2 23 08/22/2021    BUN 66 08/22/2021    CREATININE 1.3 08/22/2021    GFRAA 49 08/22/2021    LABGLOM 41 08/22/2021    GLUCOSE 197 08/22/2021    GLUCOSE 181 12/16/2011    PROT 5.8 08/22/2021    LABALBU 2.4 08/22/2021    LABALBU 4.5 11/02/2011    CALCIUM 8.5 08/22/2021    BILITOT 0.3 08/22/2021    ALKPHOS 149 08/22/2021    AST 27 08/22/2021    ALT 28 08/22/2021         Hepatic Function Panel:    Lab Results   Component Value Date    ALKPHOS 149 08/22/2021    ALT 28 08/22/2021    AST 27 08/22/2021    PROT 5.8 08/22/2021    BILITOT 0.3 08/22/2021    BILIDIR <0.2 12/26/2019    IBILI see below 12/26/2019    LABALBU 2.4 08/22/2021    LABALBU 4.5 11/02/2011       PT/INR:    Lab Results   Component Value Date    PROTIME 19.5 04/29/2021    INR 1.8 04/29/2021       TSH:    Lab Results   Component Value Date    TSH 1.890 08/18/2021       U/A:    Lab Results   Component Value Date    NITRITE negative 07/26/2018    COLORU Yellow 08/18/2021    PHUR 5.0 08/18/2021    WBCUA NONE 08/18/2021    RBCUA 1-3 08/18/2021    YEAST Present 05/28/2020    BACTERIA MANY 08/18/2021    CLARITYU Clear 08/18/2021    SPECGRAV 1.025 08/18/2021    LEUKOCYTESUR Negative 08/18/2021    UROBILINOGEN 1.0 08/18/2021    BILIRUBINUR SMALL 08/18/2021 BILIRUBINUR negative 07/26/2018    BLOODU Negative 08/18/2021    GLUCOSEU Negative 08/18/2021    AMORPHOUS FEW 08/14/2020       ABG:  No results found for: Lugene Eder, J2JVGJRG, PHART, THGBART, LCI3IVK, PO2ART, MAH6VXJ    MICROBIOLOGY:    Blood culture -  Culture, Blood 2 [[id number]] (Abnormal)    Collected: 08/18/21 1323    Updated: 08/21/21 1112    Specimen Source: Blood     Organism Shewanella algaeAbnormal     Culture, Blood 2 --    Validated susceptibility testing methods do not exist for   this isolate. Narrative:     CALL  [facility]  K35 tel. ,   Microbiology results called to and read back by Catalina Hanley RN, 08/19/2021   09:04, by [facility]   Culture, Blood 1 [[id number]] (Abnormal)    Collected: 08/18/21 1323    Updated: 08/21/21 1111    Specimen Source: Blood     Organism Shewanella algaeAbnormal     Blood Culture, Routine --    Validated susceptibility testing methods do not exist for   this isolate. Narrative:       Radiology :    CT abdomen and pelvis -   Impression:       Focal 4.4 cm right adrenal gland lesion is again demonstrated.  This is   increased in size when compared to June 2006.  Surgical consultation   recommended if patient has not had prior surgical workup. Nonspecific mild prominence of retroperitoneal lymphadenopathy adjacent to   the left common iliac artery.              IMPRESSION:     1. Shewanella bacteremia likely sec to #2   2. Left leg cellulitis   3. Morbid obesity, chronic lower extremity lymphadenoma     RECOMMENDATIONS:      1. Meropenem 1 gram IV q 8 hrs   2.  Leg elevation, diuretics

## 2021-08-22 NOTE — PLAN OF CARE
Problem: Skin Integrity:  Goal: Will show no infection signs and symptoms  Description: Will show no infection signs and symptoms  8/22/2021 1420 by Leighann Velázquez RN  Outcome: Met This Shift     Problem: Skin Integrity:  Goal: Absence of new skin breakdown  Description: Absence of new skin breakdown  8/22/2021 1420 by Leighann Velázquez RN  Outcome: Met This Shift     Problem: Falls - Risk of:  Goal: Will remain free from falls  Description: Will remain free from falls  8/22/2021 1420 by Leighann Velázquez RN  Outcome: Met This Shift     Problem: Falls - Risk of:  Goal: Absence of physical injury  Description: Absence of physical injury  8/22/2021 1420 by Leighann Velázquez RN  Outcome: Met This Shift     Problem: Pain:  Goal: Pain level will decrease  Description: Pain level will decrease  8/22/2021 1420 by Leighann Velázquez RN  Outcome: Met This Shift     Problem: Musculor/Skeletal Functional Status  Goal: Highest potential functional level  8/22/2021 1420 by Leighann Velázquez RN  Outcome: Met This Shift     Problem: Musculor/Skeletal Functional Status  Goal: Absence of falls  8/22/2021 1420 by Leighann Velázquez RN  Outcome: Met This Shift

## 2021-08-22 NOTE — PROGRESS NOTES
Occupational Therapy  OT BEDSIDE TREATMENT NOTE   [address] [facility] [address] [facility]  [address] [place] [address]       Date:2021  Patient Name: Estella Denny  MRN: [id number]  : 1955  Room: On license of [facility]-     Per OT Eval:    Evaluating OT: TRISHA Rausch  Supervising therapist: SAÚL Baker, OTR/L; #[id number]     Referring Provider: Tobi Kauffman MD  Specific Provider Orders/Date: OT Evaluation and Treatment, 21     Diagnosis: Renal lesion [N28.9]  Sepsis [facility]) [A41.9]  Surgery: None  Pertinent Medical History:  Anemia, Anxiety and depression, Arthritis, Asthma, Atrial fibrillation CHF, COPD, DM2, GERD, Hyperlipidemia, Hypertension, Inappropriate sinus tachycardia, Lymphadenitis, Occipital neuralgia, Respiratory acidosis, Sinus tachycardia, s/p L knee replacement ()     Precautions:  Fall Risk, SpO2, contact ios        Assessment of current deficits   [x]? Functional mobility                         [x]?ADLs           [x]? Strength                  []?Cognition   [x]? Functional transfers           [x]? IADLs         [x]? Safety Awareness   [x]? Endurance   []? Fine Coordination              [x]? Balance      []? Vision/perception   []? Sensation     []? Gross Motor Coordination  [x]? ROM           []?  Delirium                   []? Motor Control      OT PLAN OF CARE   OT POC based on physician orders, patient diagnosis and results of clinical assessment     Frequency/Duration: 1-3 days/wk for 2 weeks PRN   Specific OT Treatment Interventions to include:   * Instruction/training on adapted ADL techniques and AE recommendations to increase functional independence within precautions       * Training on energy conservation strategies, correct breathing pattern and techniques to improve independence/tolerance for self-care routine  * Functional transfer/mobility training/DME recommendations for increased independence, safety, and fall prevention  * Patient/Family education to increase follow through with safety techniques and functional independence  * Recommendation of environmental modifications for increased safety with functional transfers/mobility and ADLs  * Cognitive retraining/development of therapeutic activities to improve problem solving, judgement, memory, and attention for increased safety/participation in ADL/IADL tasks  * Therapeutic exercise to improve motor endurance, ROM, and functional strength for ADLs/functional transfers  * Therapeutic activities to facilitate/challenge dynamic balance, stand tolerance for increased safety and independence with ADLs  * Therapeutic activities to facilitate gross/fine motor skills for increased independence with ADLs  * Positioning to improve skin integrity, interaction with environment and functional independence.      Recommended Adaptive Equipment: TBD pending discharge plan      Home Living: Pateint lives with  and 2 grandsons in 2 story house with ramp entery. Bedroom/bathroom are located on 2nd floor but has a 1 floor set-up and sleeps on couch. Bathroom setup: walk-in shower but patient primarily sponge bathes at sink; bsc  Equipment owned: ww, rollator, bsc, SpO2, lift chair, w/c     Prior Level of Function: Assistance with ADLs and with IADLs. Patient completed functional mobility using rollator with grandsons helping with sit<>stand functional transfers with patient reporting mod I with mobility household distance. Patient reports she wears 3L SpO2 at baseline. Driving: No, family assists  Occupation: Retired [employer] employee.   Leisure: Watching drama's on TV     Pain Level: 5/10 in B leg and B feet  Cognition: A&O: 4/4; Follows 1-2 step directions              Memory:  Good              Sequencing:  Fair              Problem solving:  Fair  Judgement/safety:  Fair                Functional Assessment:  -PAC Daily Activity Raw Score: 14/24    Initial Eval Status  Date: 8/20/21 Treatment Status  Date:  8/22/21 STGs = LTGs  Time frame: 10-14 days   Feeding Set-up  Mod Indep  Seated at EOB Independent    Grooming Moderate Assist   Washed face seated in BSC and combed hair seated EOB with SBA for balance. Patient displayed self-limiting tendencies. Min A  With simple tasks, seated at EOB washing off face & hands  Stand by Assist    UB Dressing Minimal Assist   Don/doffed hospital gown seated EOB with assistance for managing lines.  Min A  seated at EOB Modified [place]  Seated for safety   LB Dressing Dependent   Don/doffed socks semi-supine in bed.  Dep  simulated Moderate Assist   Seated with AE as needed   Bathing Maximal Assist  Don/doffed socks semi-supine in bed.  Max A  simulated Minimal Assist   Seated with AE as needed   Toileting Maximal Assist   Completed toileting bsc with assistance for gurmeet-hygiene.   Max A  simulated Minimal Assist    Bed Mobility  Supine to sit: Maximal Assist x2  Sit to supine: Maximal Assist x2  HOB elevated   Patient required for positioning.  Min A  Supine to sit     Mod A  Sit to supine  Supine to sit: Minimal Assist   Sit to supine: Minimal Assist    Functional Transfers Sit <> stand various surfaces: Moderate Assist with ww  Stand Pivot: Min A with ww  Mod A   With walker  Sit < > stand  Cues for hand placement & technique Sit <> stand various surfaces: SBA with AD  Stand Pivot:  Mod I with AD   Functional Mobility NT secondary to fatigue Min A   Side stepping with walker  Modified [place] with AD   Functional household distance   Balance Sitting:     Static: SBA    Dynamic: SBA  Standing: Min A with ww Sitting: SBA  Standing: Min A with walker       Activity Tolerance Fair- Fair  Seated at EOB 15-18 minutes  92-91% on O2  nsg informed, prior to tx, pt requesting breathing tx Fair+   Visual/  Perceptual Glasses: Readers                   Education:  Pt was educated through out treatment regarding proper technique & safety with bed mobility, importance of OOB activity, functional transfers & mobility along with ADL compensatory strategies to ease tasks, improve safety & prevent falls to return home safely. Comments: Upon arrival pt was in bed, requesting to sit up & agreeable for therapy. At end of session pt was returned to bed, (unable to locate chair wide enough for pt's comfort) HOB upright, all lines and tubes intact, call light within reach. nsg present. · Pt has made Good progress towards set goals. · Continue with current plan of care      Treatment Time In: 11:25         Treatment Time Out: 11:50           Treatment Charges: Mins Units   Ther Ex  [id number]     Manual Therapy [id number]     Thera Activities [id number] 15 1   ADL/Home Mgt [id number] 10 1   Neuro Re-ed [id number]     Group Therapy      Orthotic manage/training  [id number]     Non-Billable Time     Total Timed Treatment 25 2       Najma BOO  [address] [place] [address], [address] [place] [address]

## 2021-08-22 NOTE — PROGRESS NOTES
[facility] UROLOGY  PROGRESS NOTE    Chief Complaint:   Right adrenal mass    HPI:   She is feeling much better and is more alert today. She denies any flank or abdominal pain. She continues to void comfortably    Vitals:    08/22/21 0925   BP:    Pulse:    Resp:    Temp:    SpO2: 96%       Allergies: Statins    PAST MEDICAL HISTORY:   Past Medical History:   Diagnosis Date    (HFpEF) heart failure with preserved ejection fraction ([facility]     1/16/2018- echo- LVEF 55-65%    Anal fissure     Anemia 10/6/2017    Anxiety and depression     Arthritis     Asthma     Atrial fibrillation (HCC)     Eliquis    Blood transfusion     long time no reaction    CHF [facility]congestive heart failure) [facility]     Diastolic    COPD (chronic obstructive pulmonary disease) ([facility]     Disc disorder     DM2 [facility]diabetes mellitus, type 2) [facility]     on insulin    Fall due to stumbling 10/6/2017    GERD (gastroesophageal reflux disease)     History of blood transfusion     Hx of blood clots     Hyperlipidemia     Hypertension     Inappropriate sinus tachycardia     LVH (left ventricular hypertrophy)     moderate    Lymphadenitis, chronic 4/13/2018    Occipital neuralgia 11/2/2011    Respiratory acidosis 10/7/2017    Sinus tachycardia 2/16/2015       PAST SURGICAL HISTORY:   Past Surgical History:   Procedure Laterality Date    ANOSCOPY  10/25/2006    injection of anal sphincter with Botox, Franklyn Ortiz/Timmy, [facility]    ANOSCOPY  4/9/2007    injection of anal sphincter with Botox, Dr. Mykel Miller, [address] [place] [address] ANOSCOPY  6/13/2007    injection of anal sphincter with Botox, Franklyn Haque [facility]    ANUS SURGERY  4/4/2006    [facility]  [facility]    anal exam and injection of Botox 100 units into anal sphincter, Laila Haque, [facility]   [place] COLONOSCOPY  1/20/2004 cecal polyp, bx (no pathologic changes), Dr. Yuki Esteves, [address] [facility] COLONOSCOPY  2006    snare polypectomy terminal ileum polyp (granulation tissue polyp) and anal polyp (inflammatory/papilla), Dr. Mykel Miller, [address] [facility] COLONOSCOPY  3/23/2012    bx/cauterization proximal ascending colon polyp, injection of anal sphincter with Botox, Dr. Mykel Miller, [facility]    ENDOSCOPY, COLON, DIAGNOSTIC      FRACTURE SURGERY      R leg fracture with surgery for repair    JOINT REPLACEMENT      L knee    GA DEBRIDEMENT, SKIN, SUB-Q TISSUE,MUSCLE,=<20 SQ CM Left 2018    LEFT LEG EXCISIONAL  DEBRIDEMENT WITH TISSUE BIOPSY performed by Lyle Garcia DPM at [address] ENDOSCOPY  2004    gastritis, bx (no H pylori), Dr. Yuki Esteves, [address] [facility] [address] GASTROINTESTINAL ENDOSCOPY N/A 2018    EGD BIOPSY performed by Renaye Aase, MD at [address] N/A 2018    EGD BIOPSY performed by Renaye Aase, MD at [facility] ENDOSCOPY        PAST FAMILY HISTORY:    Family History   Problem Relation Age of Onset    Heart Disease Mother     Diabetes Father    [place] Father     Other Father         Gaylin Potters Sister     Other Sister         shingles- has had over 1 year    Diabetes Paternal Aunt     Diabetes Paternal Uncle     Diabetes Paternal Aunt     Diabetes Paternal Uncle     Diabetes Sister        PAST SOCIAL HISTORY:    Social History     Socioeconomic History    Marital status:      Spouse name: None    Number of children: 3    Years of education: 9    Highest education level: 9th grade   Occupational History    Occupation: SSD   Tobacco Use    Smoking status: Former Smoker     Packs/day: 1.50     Years: 25.00     Pack years: 37.50     Types: Cigarettes     Start date: 1979     Quit date: 2004     Years since quittin.6    Smokeless tobacco: Never Used    Tobacco comment: Stopped smoking 16 years ago   Vaping Use    Vaping Use: Never used   Substance and Sexual Activity    Alcohol use: Not Currently     Comment: 1 can coke daily    Drug use: Not Currently    Sexual activity: Not Currently     Partners: Male   Other Topics Concern    None   Social History Narrative    Denies caffeine. Grandson shops for her and helps around the house    Patient unable to exercise d/t mobility status     Patient lives with her  who has limited speech ability d/t hx of CVA. He is able to assist patient with IADLS     Social Determinants of Health     Financial Resource Strain:     Difficulty of Paying Living Expenses:    Food Insecurity:     Worried About Running Out of Food in the Last Year:     [address] in the Last Year:    Transportation Needs:     Lack of Transportation (Medical):      Lack of Transportation (Non-Medical):    Physical Activity:     Days of Exercise per Week:     Minutes of Exercise per Session:    Stress:     Feeling of Stress :    Social Connections:     Frequency of Communication with Friends and Family:     Frequency of Social Gatherings with Friends and Family:     Attends Confucianism Services:     Active Member of Clubs or Organizations:     Attends Club or Organization Meetings:     Marital Status:    Intimate Partner Violence:     Fear of Current or Ex-Partner:     Emotionally Abused:     Physically Abused:     Sexually Abused:        IV:    sodium chloride      sodium chloride 75 mL/hr at 08/22/21 0755    dextrose      dilTIAZem (CARDIZEM) 100 mg in dextrose 5% 100 mL ([facility]) Stopped (08/19/21 1710)       PRN: docusate sodium, sodium chloride flush, sodium chloride, heparin flush, calcium carbonate, promethazine **OR** ondansetron, polyethylene glycol, acetaminophen **OR** acetaminophen, glucose, dextrose, glucagon (rDNA), dextrose    Scheduled:    insulin glargine  45 Units Subcutaneous BID    insulin lispro  35 Units Subcutaneous TID WC    lidocaine  5 mL Intradermal Once    sodium chloride flush  5-40 mL Intravenous 2 times per day    heparin flush  1 mL Intravenous 2 times per day    sodium chloride  1 g Oral TID WC    pantoprazole  40 mg Oral BID AC    apixaban  5 mg Oral BID    [Held by provider] bumetanide  2 mg Oral BID    escitalopram  20 mg Oral Daily    ezetimibe  10 mg Oral Nightly    ferrous sulfate  325 mg Oral Daily with breakfast    gabapentin  600 mg Oral TID    metoprolol succinate  100 mg Oral BID    spironolactone  25 mg Oral Daily    traZODone  50 mg Oral Nightly    [START ON 8/23/2021] vitamin D  50,000 Units Oral Weekly    insulin lispro  0-12 Units Subcutaneous TID WC    insulin lispro  0-6 Units Subcutaneous Nightly    meropenem  1,000 mg Intravenous Q8H    sodium chloride  33 mL Intravenous Q8H    ipratropium-albuterol  1 ampule Inhalation Q4H WA    dilTIAZem  240 mg Oral BID       Lab Results   Component Value Date     08/22/2021    K 4.9 08/22/2021    K 4.9 08/22/2021    BUN 66 08/22/2021    CREATININE 1.3 08/22/2021        Lab Results   Component Value Date    HGB 9.1 08/22/2021    HCT 29.3 08/22/2021       Review Of Systems:  Constitutional: Tired  Eyes: negative  Ears, nose, mouth, throat, and face: negative  Respiratory: Asthma  Cardiovascular: Atrial fib  Gastrointestinal: Anal fissure  Genitourinary: Right adrenal nodule  Musculoskeletal: Arthritis  Neurological: negative  Behavioral/Psych: Anxiety  Endocrine: Diabetes    Physical Exam:  Skin is dry, and without rashes  Respirations are non-labored, intact  Abdomen is soft, non-tender, non-distended. Active bowel sounds are present. No rebound or guarding. She is morbidly obese  Alert and oriented x 3. No focal motor/sensory deficits  Bilateral lower extremity lymphedema and chronic venous stasis    Assessment and Plan:  Right adrenal mass  -Urine culture on 8/18/2021 was negative.   Continue antibiotics per infectious disease recommendations  -Abdominal MRI with adrenal protocol is pending  -Metabolic evaluation of the right adrenal lesion is in process. Results often take roughly 2 weeks to be finalized. She can be discharge from my standpoint with office follow-up to go over the results once completed. -The adrenal lesion has been present for many years and only slightly increased in size from recently. In light of her significant comorbidities, this is likely to continue to be monitored and managed nonoperatively. Should a adrenalectomy be necessary, she will require referral to a tertiary care center for this.    -We will continue to follow her during her hospital stay otherwise.     Yasmin Hernandez MD  8/22/2021  1:25 PM

## 2021-08-22 NOTE — PROGRESS NOTES
Hospitalist Progress Note      PCP: Thurnell Carrel    Date of Admission: 8/18/2021    Chief Complaint: Lower extremity cellulitis lymphedema    Hospital Course:Ms. Nidia Rodrigues, a 77y.o. year old female  who  has a past medical history of (HFpEF) heart failure with preserved ejection fraction ([facility] Anal fissure, Anemia, Anxiety and depression, Arthritis, Asthma, Atrial fibrillation ([facility] Blood transfusion, CHF (congestive heart failure) ([facility] COPD (chronic obstructive pulmonary disease) ([facility] Disc disorder, DM2 (diabetes mellitus, type 2) ([facility] Fall due to stumbling, GERD (gastroesophageal reflux disease), History of blood transfusion, Hx of blood clots, Hyperlipidemia, Hypertension, Inappropriate sinus tachycardia, LVH (left ventricular hypertrophy), Lymphadenitis, chronic, Occipital neuralgia, Respiratory acidosis, and Sinus tachycardia.      Patient presents emergency department planes of abdominal pain, nausea and vomiting. Patient has a history of lymphadenitis with wounds with history of MRSA and ESBL bacteremia. Nominal pain is been going on for 2 days. Unable to keep food or medicines down. On arrival vital signs revealed the patient had a heart rate in the 120s. EKG showed atrial fibrillation with RVR. She is also initially little hypotensive with pressures 91/55. Mickey Meigs studies notable for sodium 129, potassium 5.2, creatinine 1.7, BUN 49, lactic acid 2.5, troponin 25, hemoglobin 9.8. Patient has edematous legs with excoriation, scaling, weeping foul-smelling fluid. Blood culture positive for Shewanella bacteremia     Subjective: Patient seen and examined by me personally clinically better working with physical therapym able to stand up from a bed.   No new events no fever no chills    Medications:  Reviewed    Infusion Medications    sodium chloride      sodium chloride 75 mL/hr at 08/22/21 0755    dextrose      dilTIAZem (CARDIZEM) 100 mg in dextrose 5% 100 mL ([facility]) Stopped (08/19/21 1710)     Scheduled Medications    insulin glargine  45 Units Subcutaneous BID    insulin lispro  35 Units Subcutaneous TID WC    lidocaine  5 mL Intradermal Once    sodium chloride flush  5-40 mL Intravenous 2 times per day    heparin flush  1 mL Intravenous 2 times per day    sodium chloride  1 g Oral TID WC    pantoprazole  40 mg Oral BID AC    apixaban  5 mg Oral BID    [Held by provider] bumetanide  2 mg Oral BID    escitalopram  20 mg Oral Daily    ezetimibe  10 mg Oral Nightly    ferrous sulfate  325 mg Oral Daily with breakfast    gabapentin  600 mg Oral TID    metoprolol succinate  100 mg Oral BID    spironolactone  25 mg Oral Daily    traZODone  50 mg Oral Nightly    [START ON 8/23/2021] vitamin D  50,000 Units Oral Weekly    insulin lispro  0-12 Units Subcutaneous TID WC    insulin lispro  0-6 Units Subcutaneous Nightly    meropenem  1,000 mg Intravenous Q8H    sodium chloride  33 mL Intravenous Q8H    ipratropium-albuterol  1 ampule Inhalation Q4H WA    dilTIAZem  240 mg Oral BID     PRN Meds: docusate sodium, sodium chloride flush, sodium chloride, heparin flush, calcium carbonate, promethazine **OR** ondansetron, polyethylene glycol, acetaminophen **OR** acetaminophen, glucose, dextrose, glucagon (rDNA), dextrose      Intake/Output Summary (Last 24 hours) at 8/22/2021 1313  Last data filed at 8/22/2021 0755  Gross per 24 hour   Intake 580 ml   Output --   Net 580 ml       Exam:    /66   Pulse 74   Temp 97.5 °F (36.4 °C) (Temporal)   Resp 18   Ht 5' 3\" (1.6 m)   Wt (!) 305 lb 5.4 oz (138.5 kg)   LMP  (LMP Unknown)   SpO2 96%   BMI 54.09 kg/m²     General appearance: No apparent distress, appears stated age and cooperative. HEENT: Pupils equal, round, and reactive to light. Conjunctivae/corneas clear. Neck: Supple, with full range of motion. No jugular venous distention. Trachea midline. Respiratory:  Normal respiratory effort.  Clear to auscultation, bilaterally without Rales/Wheezes/Rhonchi. Cardiovascular: Regular rate and rhythm with normal S1/S2 without murmurs, rubs or gallops. Abdomen: Soft, non-tender, non-distended with normal bowel sounds. Musculoskeletal: No clubbing, cyanosis or edema bilaterally. Full range of motion without deformity. Skin: Skin color, texture, turgor normal.  No rashes or lesions. Neurologic:  Neurovascularly intact without any focal sensory/motor deficits. Cranial nerves: II-XII intact, grossly non-focal.  Psychiatric: Alert and oriented, thought content appropriate, normal insight    Labs:   Recent Labs     08/20/21  0804 08/21/21  0623 08/22/21  0827   WBC 11.1 9.1 9.6   HGB 8.1* 8.9* 9.1*   HCT 26.1* 28.7* 29.3*    256 337     Recent Labs     08/20/21  0804 08/20/21  0804 08/21/21  0623 08/22/21  0827   *  --  126* 130*   K 4.2   < > 4.6  4.6 4.9  4.9   CL 93*  --  91* 96*   CO2 24  --  24 23   BUN 49*  --  61* 66*   CREATININE 1.2*  --  1.4* 1.3*   CALCIUM 8.3*  --  8.4* 8.5*    < > = values in this interval not displayed. Recent Labs     08/20/21  0804 08/21/21  0623 08/22/21  0827   AST 17 41* 27   ALT 12 30 28   BILITOT 0.5 0.4 0.3   ALKPHOS 107* 150* 149*     No results for input(s): INR in the last 72 hours. No results for input(s): Kristina Basques in the last 72 hours.     Assessment/Plan:    Active Hospital Problems    Diagnosis Date Noted    Sepsis ([facility] [A41.9] 11/27/2018     Sepsis secondary to  Shewanella bacteremia   Blood culture positive for Shewanella bacteremia   secondary to  Left leg cellulitis   Patient is on IV antibiotics per ID-meropenem IV   Midline insertion  per ID recommendation    Afib with RVR   Currently heart rate control   Eliquis for anticoagulation  Echo was done 4/14/2021-ejection fraction 65%  Neurology consulted on follow-up    Chronic lymphadenitis lower extremities  Elevation of lower extremities  Wound care, ACE wraps    Hyponatremia  Probably hypovolemic hyponatremia from use of diuretics  Continue on IV fluid    Hyperkalemia  Kayexalate as needed    Acute renal failure-improving  Continue on IV fluid resuscitation  Monitor renal function  CT abd showed   Impression   Focal 4.4 cm right adrenal gland lesion is again demonstrated.  This is   increased in size when compared to June 2006.  Surgical consultation   recommended if patient has not had prior surgical workup     Urology consult appreciated  not a candidate for a laparoscopic adrenalectomy at this point. Based on size criteria it is likely not malignant but will need to be monitored. She would likely require referral to a tertiary care center for adrenalectomy if it comes to this    Anemia of chronic disease  Letter H&H transfuse if needed    DVT Prophylaxis: Eliquis   Diet: ADULT DIET;  Regular; 3 carb choices (45 gm/meal)  Code Status: Full Code    PT/OT Eval Status: continue     Miguel Travis MD

## 2021-08-23 ENCOUNTER — TELEPHONE (OUTPATIENT)
Dept: CARDIOLOGY CLINIC | Age: 66
End: 2021-08-23

## 2021-08-23 VITALS
RESPIRATION RATE: 20 BRPM | BODY MASS INDEX: 51.91 KG/M2 | WEIGHT: 293 LBS | OXYGEN SATURATION: 95 % | TEMPERATURE: 97.2 F | HEART RATE: 73 BPM | DIASTOLIC BLOOD PRESSURE: 69 MMHG | SYSTOLIC BLOOD PRESSURE: 118 MMHG | HEIGHT: 63 IN

## 2021-08-23 PROBLEM — A41.9 SEPSIS (HCC): Status: RESOLVED | Noted: 2018-11-27 | Resolved: 2021-08-23

## 2021-08-23 LAB
ADRENOCORTICOTROPIC HORMONE: 18.4 PG/ML (ref 7.2–63.3)
ALBUMIN SERPL-MCNC: 2.6 G/DL (ref 3.5–5.2)
ALP BLD-CCNC: 156 U/L (ref 35–104)
ALT SERPL-CCNC: 25 U/L (ref 0–32)
ANION GAP SERPL CALCULATED.3IONS-SCNC: 9 MMOL/L (ref 7–16)
ANISOCYTOSIS: ABNORMAL
AST SERPL-CCNC: 25 U/L (ref 0–31)
BASOPHILS ABSOLUTE: 0.08 E9/L (ref 0–0.2)
BASOPHILS RELATIVE PERCENT: 0.9 % (ref 0–2)
BILIRUB SERPL-MCNC: 0.2 MG/DL (ref 0–1.2)
BUN BLDV-MCNC: 56 MG/DL (ref 6–23)
CALCIUM SERPL-MCNC: 8.6 MG/DL (ref 8.6–10.2)
CHLORIDE BLD-SCNC: 102 MMOL/L (ref 98–107)
CO2: 25 MMOL/L (ref 22–29)
CREAT SERPL-MCNC: 1.2 MG/DL (ref 0.5–1)
EOSINOPHILS ABSOLUTE: 0 E9/L (ref 0.05–0.5)
EOSINOPHILS RELATIVE PERCENT: 1.4 % (ref 0–6)
GFR AFRICAN AMERICAN: 54
GFR NON-AFRICAN AMERICAN: 45 ML/MIN/1.73
GLUCOSE BLD-MCNC: 126 MG/DL (ref 74–99)
HCT VFR BLD CALC: 30.3 % (ref 34–48)
HEMOGLOBIN: 9.3 G/DL (ref 11.5–15.5)
LYMPHOCYTES ABSOLUTE: 0.92 E9/L (ref 1.5–4)
LYMPHOCYTES RELATIVE PERCENT: 9.6 % (ref 20–42)
MCH RBC QN AUTO: 24.7 PG (ref 26–35)
MCHC RBC AUTO-ENTMCNC: 30.7 % (ref 32–34.5)
MCV RBC AUTO: 80.4 FL (ref 80–99.9)
METAMYELOCYTES RELATIVE PERCENT: 3.5 % (ref 0–1)
METER GLUCOSE: 173 MG/DL (ref 74–99)
METER GLUCOSE: 185 MG/DL (ref 74–99)
MONOCYTES ABSOLUTE: 0.55 E9/L (ref 0.1–0.95)
MONOCYTES RELATIVE PERCENT: 6.1 % (ref 2–12)
MYELOCYTE PERCENT: 1.7 % (ref 0–0)
NEUTROPHILS ABSOLUTE: 7.73 E9/L (ref 1.8–7.3)
NEUTROPHILS RELATIVE PERCENT: 78.3 % (ref 43–80)
NUCLEATED RED BLOOD CELLS: 1.7 /100 WBC
OVALOCYTES: ABNORMAL
PDW BLD-RTO: 17.5 FL (ref 11.5–15)
PLATELET # BLD: 365 E9/L (ref 130–450)
PMV BLD AUTO: 10.1 FL (ref 7–12)
POIKILOCYTES: ABNORMAL
POLYCHROMASIA: ABNORMAL
POTASSIUM REFLEX MAGNESIUM: 4.9 MMOL/L (ref 3.5–5)
RBC # BLD: 3.77 E12/L (ref 3.5–5.5)
ROULEAUX: ABNORMAL
SARS-COV-2, NAAT: NOT DETECTED
SODIUM BLD-SCNC: 136 MMOL/L (ref 132–146)
TOTAL PROTEIN: 5.8 G/DL (ref 6.4–8.3)
WBC # BLD: 9.2 E9/L (ref 4.5–11.5)

## 2021-08-23 PROCEDURE — 2580000003 HC RX 258: Performed by: INTERNAL MEDICINE

## 2021-08-23 PROCEDURE — 6370000000 HC RX 637 (ALT 250 FOR IP): Performed by: FAMILY MEDICINE

## 2021-08-23 PROCEDURE — 6360000002 HC RX W HCPCS: Performed by: INTERNAL MEDICINE

## 2021-08-23 PROCEDURE — 2580000003 HC RX 258: Performed by: FAMILY MEDICINE

## 2021-08-23 PROCEDURE — 94640 AIRWAY INHALATION TREATMENT: CPT

## 2021-08-23 PROCEDURE — 36415 COLL VENOUS BLD VENIPUNCTURE: CPT

## 2021-08-23 PROCEDURE — 82962 GLUCOSE BLOOD TEST: CPT

## 2021-08-23 PROCEDURE — 87635 SARS-COV-2 COVID-19 AMP PRB: CPT

## 2021-08-23 PROCEDURE — 80053 COMPREHEN METABOLIC PANEL: CPT

## 2021-08-23 PROCEDURE — 6370000000 HC RX 637 (ALT 250 FOR IP): Performed by: INTERNAL MEDICINE

## 2021-08-23 PROCEDURE — 2700000000 HC OXYGEN THERAPY PER DAY

## 2021-08-23 PROCEDURE — 85025 COMPLETE CBC W/AUTO DIFF WBC: CPT

## 2021-08-23 PROCEDURE — 2500000003 HC RX 250 WO HCPCS: Performed by: INTERNAL MEDICINE

## 2021-08-23 RX ORDER — FUROSEMIDE 10 MG/ML
40 INJECTION INTRAMUSCULAR; INTRAVENOUS ONCE
Status: COMPLETED | OUTPATIENT
Start: 2021-08-23 | End: 2021-08-23

## 2021-08-23 RX ADMIN — HEPARIN 100 UNITS: 100 SYRINGE at 08:16

## 2021-08-23 RX ADMIN — FUROSEMIDE 40 MG: 10 INJECTION, SOLUTION INTRAMUSCULAR; INTRAVENOUS at 14:31

## 2021-08-23 RX ADMIN — DILTIAZEM HYDROCHLORIDE 240 MG: 120 CAPSULE, COATED, EXTENDED RELEASE ORAL at 08:14

## 2021-08-23 RX ADMIN — GABAPENTIN 600 MG: 300 CAPSULE ORAL at 13:37

## 2021-08-23 RX ADMIN — MEROPENEM 1000 MG: 1 INJECTION, POWDER, FOR SOLUTION INTRAVENOUS at 09:40

## 2021-08-23 RX ADMIN — SODIUM CHLORIDE 33 ML: 9 INJECTION, SOLUTION INTRAVENOUS at 13:37

## 2021-08-23 RX ADMIN — MICONAZOLE NITRATE: 2 POWDER TOPICAL at 13:37

## 2021-08-23 RX ADMIN — INSULIN LISPRO 35 UNITS: 100 INJECTION, SOLUTION INTRAVENOUS; SUBCUTANEOUS at 13:35

## 2021-08-23 RX ADMIN — GABAPENTIN 600 MG: 300 CAPSULE ORAL at 08:10

## 2021-08-23 RX ADMIN — APIXABAN 5 MG: 5 TABLET, FILM COATED ORAL at 08:11

## 2021-08-23 RX ADMIN — SPIRONOLACTONE 25 MG: 25 TABLET ORAL at 08:12

## 2021-08-23 RX ADMIN — MEROPENEM 1000 MG: 1 INJECTION, POWDER, FOR SOLUTION INTRAVENOUS at 02:16

## 2021-08-23 RX ADMIN — INSULIN LISPRO 2 UNITS: 100 INJECTION, SOLUTION INTRAVENOUS; SUBCUTANEOUS at 09:28

## 2021-08-23 RX ADMIN — ACETAMINOPHEN 650 MG: 325 TABLET ORAL at 10:49

## 2021-08-23 RX ADMIN — IPRATROPIUM BROMIDE AND ALBUTEROL SULFATE 1 AMPULE: .5; 2.5 SOLUTION RESPIRATORY (INHALATION) at 12:45

## 2021-08-23 RX ADMIN — ERGOCALCIFEROL 50000 UNITS: 1.25 CAPSULE ORAL at 08:12

## 2021-08-23 RX ADMIN — SODIUM CHLORIDE: 9 INJECTION, SOLUTION INTRAVENOUS at 05:57

## 2021-08-23 RX ADMIN — ESCITALOPRAM 20 MG: 10 TABLET, FILM COATED ORAL at 08:12

## 2021-08-23 RX ADMIN — INSULIN LISPRO 2 UNITS: 100 INJECTION, SOLUTION INTRAVENOUS; SUBCUTANEOUS at 13:36

## 2021-08-23 RX ADMIN — Medication 1 G: at 13:38

## 2021-08-23 RX ADMIN — DOCUSATE SODIUM 100 MG: 100 CAPSULE, LIQUID FILLED ORAL at 08:15

## 2021-08-23 RX ADMIN — POLYETHYLENE GLYCOL 3350 17 G: 17 POWDER, FOR SOLUTION ORAL at 08:16

## 2021-08-23 RX ADMIN — SODIUM CHLORIDE, PRESERVATIVE FREE 10 ML: 5 INJECTION INTRAVENOUS at 08:16

## 2021-08-23 RX ADMIN — FERROUS SULFATE TAB 325 MG (65 MG ELEMENTAL FE) 325 MG: 325 (65 FE) TAB at 08:11

## 2021-08-23 RX ADMIN — METOPROLOL SUCCINATE 100 MG: 100 TABLET, EXTENDED RELEASE ORAL at 08:11

## 2021-08-23 RX ADMIN — INSULIN LISPRO 35 UNITS: 100 INJECTION, SOLUTION INTRAVENOUS; SUBCUTANEOUS at 09:33

## 2021-08-23 RX ADMIN — IPRATROPIUM BROMIDE AND ALBUTEROL SULFATE 1 AMPULE: .5; 2.5 SOLUTION RESPIRATORY (INHALATION) at 09:05

## 2021-08-23 RX ADMIN — Medication 1 G: at 08:15

## 2021-08-23 RX ADMIN — INSULIN GLARGINE 45 UNITS: 100 INJECTION, SOLUTION SUBCUTANEOUS at 08:17

## 2021-08-23 ASSESSMENT — PAIN SCALES - GENERAL
PAINLEVEL_OUTOF10: 6
PAINLEVEL_OUTOF10: 0

## 2021-08-23 ASSESSMENT — PAIN DESCRIPTION - ONSET: ONSET: ON-GOING

## 2021-08-23 ASSESSMENT — PAIN - FUNCTIONAL ASSESSMENT: PAIN_FUNCTIONAL_ASSESSMENT: PREVENTS OR INTERFERES SOME ACTIVE ACTIVITIES AND ADLS

## 2021-08-23 ASSESSMENT — PAIN DESCRIPTION - DESCRIPTORS: DESCRIPTORS: DISCOMFORT;ACHING

## 2021-08-23 ASSESSMENT — PAIN DESCRIPTION - PROGRESSION: CLINICAL_PROGRESSION: NOT CHANGED

## 2021-08-23 ASSESSMENT — PAIN SCALES - WONG BAKER: WONGBAKER_NUMERICALRESPONSE: 6

## 2021-08-23 ASSESSMENT — PAIN DESCRIPTION - FREQUENCY: FREQUENCY: INTERMITTENT

## 2021-08-23 ASSESSMENT — PAIN DESCRIPTION - PAIN TYPE: TYPE: CHRONIC PAIN

## 2021-08-23 ASSESSMENT — PAIN DESCRIPTION - LOCATION: LOCATION: LEG

## 2021-08-23 NOTE — DISCHARGE SUMMARY
Hospital Medicine Discharge Summary    Patient ID: Vandana Cuevas      Patient's PCP: Selin Precise    Admit Date: 8/18/2021     Discharge Date:   8/23/21    Admitting Physician: Myron Delong DO     Discharge Physician: Onelia Rawls MD     Discharge Diagnoses: Acute on chronic bilateral lower extremity cellulitis with chronic lymphedema    The patient was seen and examined on day of discharge and this discharge summary is in conjunction with any daily progress note from day of discharge. Hospital Course:      70 y.o. year old female  who  has a past medical history of (HFpEF) heart failure with preserved ejection fraction ([address] Anal fissure, Anemia, Anxiety and depression, Arthritis, Asthma, Atrial fibrillation ([address] Blood transfusion, CHF (congestive heart failure) ([address] COPD (chronic obstructive pulmonary disease) ([address] Disc disorder, DM2 (diabetes mellitus, type 2) ([address] Fall due to stumbling, GERD (gastroesophageal reflux disease), History of blood transfusion, Hx of blood clots, Hyperlipidemia, Hypertension, Inappropriate sinus tachycardia, LVH (left ventricular hypertrophy), Lymphadenitis, chronic, Occipital neuralgia, Respiratory acidosis, and Sinus tachycardia.      Patient presents emergency department planes of abdominal pain, nausea and vomiting.  Patient has a history of lymphadenitis with wounds with history of MRSA and ESBL bacteremia.  Nominal pain is been going on for 2 days.  Unable to keep food or medicines down.  On arrival vital signs revealed the patient had a heart rate in the 120s.  EKG showed atrial fibrillation with RVR.  She is also initially little hypotensive with pressures 91/55.  Bh[place]ar studies notable for sodium 129, potassium 5.2, creatinine 1.7, BUN 49, lactic acid 2.5, troponin 25, hemoglobin 9.8.  Patient has edematous legs with excoriation, scaling, weeping foul-smelling fluid.   Blood culture positive for Shewanella bacteremia patient started on IV meropenem per ID-discharge plan to skilled nursing facility on continuous IV antibiotics duration for 10 days per ID  Discharge diagnosis     Sepsis secondary to  Shewanella bacteremia   Blood culture positive for Shewanella bacteremia   secondary to acute on chronic left leg cellulitis   Patient is on IV antibiotics per ID-meropenem IV   Midline insertion  per ID recommendation  Patient cleared for discharge on antibiotics per ID     Afib with RVR   Currently heart rate control   Eliquis for anticoagulation  Echo was done 4/14/2021-ejection fraction 65%  Neurology consulted on follow-up     Chronic lymphadenitis lower extremities  Elevation of lower extremities  Wound care, ACE wraps     Hyponatremia  Probably hypovolemic hyponatremia from use of diuretics  Continue on IV fluid     Hyperkalemia  Kayexalate as needed     Acute renal failure-improving  Continue on IV fluid resuscitation  Monitor renal function  CT abd showed   Impression   Focal 4.4 cm right adrenal gland lesion is again demonstrated.  This is   increased in size when compared to June 2006.  Surgical consultation   recommended if patient has not had prior surgical workup      Urology consult appreciated  not a candidate for a laparoscopic adrenalectomy at this point.    Based on size criteria it is likely not malignant but will need to be monitored.    She would likely require referral to a tertiary care center for adrenalectomy if it comes to this     Anemia of chronic disease  Letter H&H transfuse if needed      Exam:     /69   Pulse 73   Temp 97.2 °F (36.2 °C) (Temporal)   Resp 20   Ht 5' 3\" (1.6 m)   Wt (!) 305 lb 5.4 oz (138.5 kg)   LMP  (LMP Unknown)   SpO2 95%   BMI 54.09 kg/m²     General appearance: No apparent distress, appears stated age and cooperative. HEENT: Pupils equal, round, and reactive to light. Conjunctivae/corneas clear. Neck: Supple, with full range of motion. No jugular venous distention. Trachea midline.   Respiratory: Normal respiratory effort. Clear to auscultation, bilaterally without Rales/Wheezes/Rhonchi. Cardiovascular: Regular rate and rhythm with normal S1/S2 without murmurs, rubs or gallops. Abdomen: Soft, non-tender, non-distended with normal bowel sounds. Musculoskeletal: No clubbing, cyanosis or edema bilaterally. Full range of motion without deformity. Skin: Skin color, texture, turgor normal.  No rashes or lesions. Neurologic:  Neurovascularly intact without any focal sensory/motor deficits. Cranial nerves: II-XII intact, grossly non-focal.  Psychiatric: Alert and oriented, thought content appropriate, normal insight      Consults:     IP CONSULT TO INTERNAL MEDICINE  IP CONSULT TO INFECTIOUS DISEASES  IP CONSULT TO CARDIOLOGY  IP CONSULT TO UROLOGY    Significant Diagnostic Studies:       Disposition:  SNF    Discharge Instructions/Follow-up:      Code Status:  Full Code     Activity: activity as tolerated    Diet: diabetic diet and renal diet    Labs:  For convenience and continuity at follow-up the following most recent labs are provided:      CBC:    Lab Results   Component Value Date    WBC 9.2 08/23/2021    HGB 9.3 08/23/2021    HCT 30.3 08/23/2021     08/23/2021       Renal:    Lab Results   Component Value Date     08/23/2021    K 4.9 08/23/2021     08/23/2021    CO2 25 08/23/2021    BUN 56 08/23/2021    CREATININE 1.2 08/23/2021    CALCIUM 8.6 08/23/2021    PHOS 3.2 05/30/2018       Discharge Medications:     Current Discharge Medication List           Details   meropenem (MERREM) infusion Infuse 1,000 mg intravenously every 8 hours for 10 days 10 days  Qty: 21594 mg, Refills: 0      insulin lispro (HUMALOG) 100 UNIT/ML injection vial Inject 0-12 Units into the skin 3 times daily (with meals)  Qty: 1 vial, Refills: 3              Details   dilTIAZem (CARDIZEM CD) 240 MG extended release capsule Take 1 capsule by mouth 2 times daily  Qty: 30 capsule, Refills: 3      ezetimibe (Nadara Luster) 10 MG tablet Take 1 tablet by mouth nightly  Qty: 30 tablet, Refills: 3      metoprolol succinate (TOPROL XL) 100 MG extended release tablet Take 1 tablet by mouth 2 times daily  Qty: 30 tablet, Refills: 3      insulin glargine (LANTUS SOLOSTAR) 100 UNIT/ML injection pen Inject 45 Units into the skin 2 times daily       miconazole (MICOTIN) 2 % powder Apply topically 2 times daily Apply to breasts and abdominal folds      mineral oil-hydrophilic petrolatum (AQUAPHOR) ointment Apply topically 3 times daily as needed for Dry Skin      acetaminophen (TYLENOL) 500 MG tablet Take 1,000 mg by mouth every 6 hours as needed for Pain      escitalopram (LEXAPRO) 20 MG tablet Take 1 tablet by mouth daily  Qty: 30 tablet, Refills: 0      miconazole nitrate 2 % OINT Apply topically 3 times daily as needed (after toileting)  Qty: 1 Tube, Refills: 0      apixaban (ELIQUIS) 5 MG TABS tablet Take 5 mg by mouth 2 times daily      bumetanide (BUMEX) 2 MG tablet Take 2 mg by mouth 2 times daily      gabapentin (NEURONTIN) 400 MG capsule Take 600 mg by mouth 3 times daily. lansoprazole (PREVACID) 30 MG delayed release capsule Take 30 mg by mouth 2 times daily      traZODone (DESYREL) 50 MG tablet Take 50 mg by mouth nightly      vitamin D (ERGOCALCIFEROL) 1.25 MG (58601 UT) CAPS capsule Take 50,000 Units by mouth once a week Given Monday      omega-3 acid ethyl esters (LOVAZA) 1 g capsule Take 2 capsules by mouth 2 times daily  Qty: 60 capsule, Refills: 3      spironolactone (ALDACTONE) 25 MG tablet Take 1 tablet by mouth daily  Qty: 30 tablet, Refills: 5      ferrous sulfate (IRON 325) 325 (65 Fe) MG tablet Take 1 tablet by mouth daily (with breakfast)  Qty: 90 tablet, Refills: 1    Associated Diagnoses: Iron deficiency             Time Spent on discharge is more than 45 minutes in the examination, evaluation, counseling and review of medications and discharge plan.   Patient is stable at discharge     Signed:    Macy Zabala MD   8/23/2021      Thank you Lo Ventura for the opportunity to be involved in this patient's care. If you have any questions or concerns please feel free to contact me at [phone].

## 2021-08-23 NOTE — PROGRESS NOTES
Department of Internal Medicine  Infectious Diseases   Progress Note      C/C : Shewanella bacteremia, left leg cellulitis     Denies fever or chills  Denies leg pain   Afebrile     Current Facility-Administered Medications   Medication Dose Route Frequency Provider Last Rate Last Admin    miconazole (MICOTIN) 2 % powder   Topical BID Dayanna Ferguson MD        docusate sodium (COLACE) capsule 100 mg  100 mg Oral BID PRN Pro Dean MD   100 mg at 08/23/21 0815    insulin glargine (LANTUS) injection vial 45 Units  45 Units Subcutaneous BID Dayanna Ferguson MD   45 Units at 08/23/21 0817    insulin lispro (HUMALOG) injection vial 35 Units  35 Units Subcutaneous TID  Dayanna Ferguson MD   35 Units at 08/23/21 0933    lidocaine 1 % injection 5 mL  5 mL Intradermal Once Marquis Philippe MD        sodium chloride flush 0.9 % injection 5-40 mL  5-40 mL Intravenous 2 times per day Monica Figueroa MD   10 mL at 08/23/21 0816    sodium chloride flush 0.9 % injection 5-40 mL  5-40 mL Intravenous PRN Marquis Philippe MD        0.9 % sodium chloride infusion  25 mL Intravenous PRN Marquis Philippe MD        heparin flush 100 UNIT/ML injection 100 Units  1 mL Intravenous 2 times per day Windy Philippe MD   100 Units at 08/23/21 0816    heparin flush 100 UNIT/ML injection 100 Units  1 mL Intracatheter PRN Marquis Philippe MD        sodium chloride tablet 1 g  1 g Oral TID  Kunal CANNON MD   1 g at 08/23/21 0815    calcium carbonate (TUMS) chewable tablet 500 mg  500 mg Oral TID PRN Dayanna Ferguson MD   500 mg at 08/20/21 1730    pantoprazole (PROTONIX) tablet 40 mg  40 mg Oral BID AC Kirill Broussard DO   40 mg at 08/22/21 1752    apixaban (ELIQUIS) tablet 5 mg  5 mg Oral BID Kirill Broussard DO   5 mg at 08/23/21 5600    [Held by provider] bumetanide (BUMEX) tablet 2 mg  2 mg Oral BID Kirill Broussard DO   2 mg at 08/19/21 0931    escitalopram (LEXAPRO) tablet 20 mg  20 mg Oral Daily Kirill Broussard DO   20 mg at 08/23/21 6263    ezetimibe (ZETIA) tablet 10 mg  10 mg Oral Nightly Doyce Krupa, DO   10 mg at 08/22/21 2058    ferrous sulfate (IRON 325) tablet 325 mg  325 mg Oral Daily with breakfast Doyce Krupa, DO   325 mg at 08/23/21 0894    gabapentin (NEURONTIN) capsule 600 mg  600 mg Oral TID Doyce Krupa, DO   600 mg at 08/23/21 5104    metoprolol succinate (TOPROL XL) extended release tablet 100 mg  100 mg Oral BID Nolia Lebanon, DO   100 mg at 08/23/21 2211    spironolactone (ALDACTONE) tablet 25 mg  25 mg Oral Daily Doyce Krupa, DO   25 mg at 08/23/21 6934    traZODone (DESYREL) tablet 50 mg  50 mg Oral Nightly Doyce Krupa, DO   50 mg at 08/22/21 2058    vitamin D (ERGOCALCIFEROL) capsule 50,000 Units  50,000 Units Oral Weekly Doyce Krupa, DO   50,000 Units at 08/23/21 8628    promethazine (PHENERGAN) tablet 12.5 mg  12.5 mg Oral Q6H PRN Doyce Krupa, DO        Or    ondansetron [facility]) injection 4 mg  4 mg Intravenous Q6H PRN Doyce Krupa, DO        polyethylene glycol (GLYCOLAX) packet 17 g  17 g Oral Daily PRN Doyce Krupa, DO   17 g at 08/23/21 0816    acetaminophen (TYLENOL) tablet 650 mg  650 mg Oral Q6H PRN Doyce Krupa, DO   650 mg at 08/23/21 1049    Or    acetaminophen (TYLENOL) suppository 650 mg  650 mg Rectal Q6H PRN Doyce Krupa, DO        0.9 % sodium chloride infusion   Intravenous Continuous Doyce Krupa, DO 75 mL/hr at 08/23/21 0557 New Bag at 08/23/21 0557    glucose (GLUTOSE) 40 % oral gel 15 g  15 g Oral PRN Doyce Krupa, DO        dextrose 50 % IV solution  12.5 g Intravenous PRN Doyce Krupa, DO        glucagon (rDNA) injection 1 mg  1 mg Intramuscular PRN Doyce Krupa, DO        dextrose 5 % solution  100 mL/hr Intravenous PRN Doyce Krupa, DO        insulin lispro (HUMALOG) injection vial 0-12 Units  0-12 Units Subcutaneous TID MACRINA Gentile DO   2 Units at 08/23/21 0928    insulin lispro (HUMALOG) injection vial 0-6 Units  0-6 Units Subcutaneous Nightly Delfino Salazar, DO   1 Units at 08/21/21 2152    meropenem (MERREM) 1,000 mg in sodium chloride 0.9 % 100 mL IVPB (mini-bag)  1,000 mg Intravenous Q8H Marquis Philippe MD 33.3 mL/hr at 08/23/21 0940 1,000 mg at 08/23/21 0940    Post meropenem saline flush  33 mL Intravenous Q8H Marquis Philippe MD   Stopped at 08/22/21 1437    ipratropium-albuterol (DUONEB) nebulizer solution 1 ampule  1 ampule Inhalation Q4H WA Janene CANNON MD   1 ampule at 08/23/21 1245    dilTIAZem 100 mg in dextrose 5 % 100 mL infusion (ADD-Kelso)  5-15 mg/hr Intravenous Continuous Cristy Landeros DO   Held at 08/19/21 1710    dilTIAZem (CARDIZEM CD) extended release capsule 240 mg  240 mg Oral BID Cristy Landeros DO   240 mg at 08/23/21 0430         REVIEW OF SYSTEMS:    CONSTITUTIONAL:  Denies fever or chills   HEENT: denies blurring of vision or double vision, denies hearing problem  RESPIRATORY: SOB . CARDIOVASCULAR:  Denies palpitation  GASTROINTESTINAL: Nausea, vomiting, diarrhea   GENITOURINARY:  Denies burning urination or frequency of urination  INTEGUMENT: Left leg redness   HEMATOLOGIC/LYMPHATIC:  Denies lymph node swelling   MUSCULOSKELETAL: Bilateral leg swelling, left leg erythema   NEUROLOGICAL:  Weakness     PHYSICAL EXAM:      Vitals:     /69   Pulse 73   Temp 97.2 °F (36.2 °C) (Temporal)   Resp 20   Ht 5' 3\" (1.6 m)   Wt (!) 305 lb 5.4 oz (138.5 kg)   LMP  (LMP Unknown)   SpO2 95%   BMI 54.09 kg/m²     General Appearance:    Awake, alert , no acute distress, morbidly obese    Head:    Normocephalic, atraumatic   Eyes:    No pallor, no icterus,   Ears:    No obvious deformity or drainage.    Nose:   No nasal drainage   Throat:   Mucosa moist, no oral thrush   Neck:   Supple, no lymphadenopathy   Lungs:     Clear to auscultation bilaterally, no wheeze    Heart:    Regular rate and rhythm, no murmur   Abdomen:     Soft, non-tender, bowel sounds present    Extremities:   Bilateral pitting edema, left leg erythema,non tender    Pulses:   Dorsalis pedis not palpable    Skin:   Left leg erythema      CBC with Differential:      Lab Results   Component Value Date    WBC 9.2 08/23/2021    RBC 3.77 08/23/2021    HGB 9.3 08/23/2021    HCT 30.3 08/23/2021     08/23/2021    MCV 80.4 08/23/2021    MCH 24.7 08/23/2021    MCHC 30.7 08/23/2021    RDW 17.5 08/23/2021    NRBC 1.7 08/23/2021    SEGSPCT 85 01/29/2014    METASPCT 3.5 08/23/2021    LYMPHOPCT 9.6 08/23/2021    PROMYELOPCT 2.6 08/21/2021    MONOPCT 6.1 08/23/2021    MYELOPCT 1.7 08/23/2021    BASOPCT 0.9 08/23/2021    MONOSABS 0.55 08/23/2021    LYMPHSABS 0.92 08/23/2021    EOSABS 0.00 08/23/2021    BASOSABS 0.08 08/23/2021       CMP     Lab Results   Component Value Date     08/23/2021    K 4.9 08/23/2021     08/23/2021    CO2 25 08/23/2021    BUN 56 08/23/2021    CREATININE 1.2 08/23/2021    GFRAA 54 08/23/2021    LABGLOM 45 08/23/2021    GLUCOSE 126 08/23/2021    GLUCOSE 181 12/16/2011    PROT 5.8 08/23/2021    LABALBU 2.6 08/23/2021    LABALBU 4.5 11/02/2011    CALCIUM 8.6 08/23/2021    BILITOT 0.2 08/23/2021    ALKPHOS 156 08/23/2021    AST 25 08/23/2021    ALT 25 08/23/2021         Hepatic Function Panel:    Lab Results   Component Value Date    ALKPHOS 156 08/23/2021    ALT 25 08/23/2021    AST 25 08/23/2021    PROT 5.8 08/23/2021    BILITOT 0.2 08/23/2021    BILIDIR <0.2 12/26/2019    IBILI see below 12/26/2019    LABALBU 2.6 08/23/2021    LABALBU 4.5 11/02/2011       PT/INR:    Lab Results   Component Value Date    PROTIME 19.5 04/29/2021    INR 1.8 04/29/2021       TSH:    Lab Results   Component Value Date    TSH 1.890 08/18/2021       U/A:    Lab Results   Component Value Date    NITRITE negative 07/26/2018    COLORU Yellow 08/18/2021    PHUR 5.0 08/18/2021    WBCUA NONE 08/18/2021    RBCUA 1-3 08/18/2021    YEAST Present 05/28/2020    BACTERIA MANY 08/18/2021    CLARITYU Clear 08/18/2021    SPECGRAV 1.025 08/18/2021    LEUKOCYTESUR Negative 08/18/2021    UROBILINOGEN 1.0 08/18/2021    BILIRUBINUR SMALL 08/18/2021    BILIRUBINUR negative 07/26/2018    BLOODU Negative 08/18/2021    GLUCOSEU Negative 08/18/2021    AMORPHOUS FEW 08/14/2020       ABG:  No results found for: GDH4WZV, BEART, C8GKYZWG, PHART, THGBART, UUH8MIT, PO2ART, BOD5PEO    MICROBIOLOGY:    Blood culture -  Culture, Blood 2 [[id number]] (Abnormal)    Collected: 08/18/21 1323    Updated: 08/21/21 1112    Specimen Source: Blood     Organism Shewanella algaeAbnormal     Culture, Blood 2 --    Validated susceptibility testing methods do not exist for   this isolate. Narrative:     CALL  [facility]  F86 tel. ,   Microbiology results called to and read back by Catalina Hanley RN, 08/19/2021   09:04, by [place]   Culture, Blood 1 [[id number]] (Abnormal)    Collected: 08/18/21 1323    Updated: 08/21/21 1111    Specimen Source: Blood     Organism Shewanella algaeAbnormal     Blood Culture, Routine --    Validated susceptibility testing methods do not exist for   this isolate. Narrative:       Radiology :    CT abdomen and pelvis -   Impression:       Focal 4.4 cm right adrenal gland lesion is again demonstrated.  This is   increased in size when compared to June 2006.  Surgical consultation   recommended if patient has not had prior surgical workup. Nonspecific mild prominence of retroperitoneal lymphadenopathy adjacent to   the left common iliac artery.              IMPRESSION:     1. Shewanella bacteremia likely sec to #2   2. Left leg cellulitis - improving   3. Morbid obesity, chronic lower extremity lymphadenoma     RECOMMENDATIONS:      1. Meropenem 1 gram IV q 8 hrs X 10 days   2.  Leg elevation, diuretics

## 2021-08-23 NOTE — PROGRESS NOTES
Nurse to Nurse called to [facility] at [phone]. All paperwork faxed to facility. All belongings sent with patient via transport facility.

## 2021-08-23 NOTE — PROCEDURES
8/23 7am- pt brought to MRI, measures 28\" she will not fit for closed MRI with abdomen coil. Additionally, she cannot lay down for even 10 seconds to get measured, pt says she cannot breath laying down. MRI abdomen cannot be done. Let unit know, to notify RN and let dr Marsha Ruelas know.

## 2021-08-23 NOTE — FLOWSHEET NOTE
Inpatient Wound Care (Initial consult) [id number]    Admit Date: 8/18/2021 12:28 PM    Reason for consult:   Bilateral Legs    Significant history: Per H&P     Chief Complaint:  had concerns including Abdominal Pain (periumbilical pain 2 days ), Nausea (cant keep anything down, 2 days ), and Emesis.     History Of Present Illness:    Ms. Medina Ortiz, a 77y.o. year old female  who  has a past medical history of (HFpEF) heart failure with preserved ejection fraction ([address] Anal fissure, Anemia, Anxiety and depression, Arthritis, Asthma, Atrial fibrillation ([address] Blood transfusion, CHF (congestive heart failure) ([address] COPD (chronic obstructive pulmonary disease) ([address] Disc disorder, DM2 (diabetes mellitus, type 2) ([address] Fall due to stumbling, GERD (gastroesophageal reflux disease), History of blood transfusion, Hx of blood clots, Hyperlipidemia, Hypertension, Inappropriate sinus tachycardia, LVH (left ventricular hypertrophy), Lymphadenitis, chronic, Occipital neuralgia, Respiratory acidosis, and Sinus tachycardia. Findings:      08/23/21 1120   Skin Integrity   Skin Integrity Excoriation   Location abdominal folds   Skin Integrity Site 2   Skin Integrity Location 2   (lymphedema)   Location 2 BLE   Skin Integrity Site 3   Skin Integrity Location 3   (redness, blanchable)    Location 3   (bilateral buttocks)   Wound 04/14/21 Leg Lower;Right   Date First Assessed/Time First Assessed: 04/14/21 1445   Present on Hospital Admission: Yes  Location: Leg  Wound Location Orientation: Lower;Right   Wound Image    Dressing Status New dressing applied   Wound Cleansed Cleansed with saline   Dressing/Treatment ABD; Alginate;Roll gauze   Wound Length (cm) 5 cm   Wound Width (cm) 8 cm   Wound Depth (cm) 0.1 cm   Wound Surface Area (cm^2) 40 cm^2   Wound Volume (cm^3) 4 cm^3   Wound Assessment Pink/red  (maceration)   Drainage Amount Moderate   Drainage Description Yellow   Odor Mild   Ely-wound Assessment   (lymphedema) Wound 04/14/21 Leg Left; Lower   Date First Assessed/Time First Assessed: 04/14/21 1445   Present on Hospital Admission: (c)   Location: Leg  Wound Location Orientation: Left; Lower   Wound Image      Dressing Status New dressing applied   Wound Cleansed Cleansed with saline   Dressing/Treatment ABD; Alginate;Roll gauze   Wound Length (cm) 12 cm   Wound Width (cm) 45 cm   Wound Depth (cm) 0.1 cm   Wound Surface Area (cm^2) 540 cm^2   Wound Volume (cm^3) 54 cm^3   Wound Assessment Pink/red  (maceration)   Drainage Amount Moderate   Drainage Description Yellow   Odor Mild   Ely-wound Assessment   (lymphedema)   Wound 08/23/21 Leg Medial;Lower;Right   Date First Assessed/Time First Assessed: 08/23/21 1122   Present on Hospital Admission: Yes  Location: Leg  Wound Location Orientation: Medial;Lower;Right   Wound Image    Dressing Status New dressing applied   Wound Cleansed Cleansed with saline   Dressing/Treatment ABD; Alginate;Roll gauze   Wound Length (cm) 5 cm   Wound Width (cm) 6 cm   Wound Depth (cm) 0.1 cm   Wound Surface Area (cm^2) 30 cm^2   Wound Volume (cm^3) 3 cm^3   Wound Assessment Pink/red  (maceration)   Drainage Amount Moderate   Drainage Description Yellow   Odor Mild   Ely-wound Assessment   (lymphedema)      **Informed Consent**    The patient has given verbal consent to have photos taken of wounds and inserted into their chart as part of their permanent medical record for purposes of documentation, treatment management and/or medical review. All Images taken on 8/23/21 of patient name: Vidal Payne were transmitted and stored on secured [employer] located within [facility] by a registered [employer] Mobile Application Device. Impression:  Bilateral lower extremities: lymphedema    Plan: Opticell, abd pads, kerlix, ace wraps  antifungal powder  TAPS  Heel protectors  Patient will need continued preventative care.       Iliana Ayala RN 8/23/2021 11:27 AM

## 2021-08-23 NOTE — DISCHARGE INSTR - COC
surgery for repair    JOINT REPLACEMENT  2003    L knee    TX DEBRIDEMENT, SKIN, SUB-Q TISSUE,MUSCLE,=<20 SQ CM Left 11/30/2018    LEFT LEG EXCISIONAL  DEBRIDEMENT WITH TISSUE BIOPSY performed by Gurpreet Crawford DPM at [address] ENDOSCOPY  1/20/2004    gastritis, bx (no H pylori), Dr. Carla Nguyen, [address] GASTROINTESTINAL ENDOSCOPY N/A 6/1/2018    EGD BIOPSY performed by Zayda Rodríguez MD at [address] [place] [address] N/A 11/9/2018    EGD BIOPSY performed by Zayda Rodríguez MD at [facility] ENDOSCOPY       Immunization History:   Immunization History   Administered Date(s) Administered    Influenza 10/26/2012    Influenza Virus Vaccine 10/08/2013, 09/04/2015    Influenza, High Dose (Fluzone 65 yrs and older) 11/07/2019    Influenza, Quadv, IM, PF (6 mo and older Fluzone, Flulaval, Fluarix, and 3 yrs and older Afluria) 10/24/2016, 12/14/2017    Pneumococcal Polysaccharide ([id number]) 09/04/2015    Zoster Live (Zostavax) 01/02/2015       Active Problems:  Patient Active Problem List   Diagnosis Code    Uncontrolled diabetes mellitus ([facility] E11.65    Depression F32.9    Essential hypertension I10    Hyperlipidemia E78.5    Osteoarthritis M19.90    PAF (paroxysmal atrial fibrillation) (HCC) - currently in SR I48.0    Pulmonary hypertension I27.20    Chronic sinusitis J32.9    Chronic pain G89.29    Steatohepatitis K75.81    Franklin's cyst of knee M71.20    Diabetic neuropathy (HCC) E11.40    Iron deficiency E61.1    LVH (left ventricular hypertrophy) I51.7    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    Positive hepatitis C antibody test R76.8    PFO (patent foramen ovale) Q21.1    YEIMI (acute kidney injury) ([facility] N17.9    Chronic diastolic heart failure (HCC) I50.32    Physical deconditioning R53.81    Lymphedema of both lower extremities I89.0    Chronic anemia D64.9    Dyspnea on exertion R06.00    Chronic deep vein thrombosis (DVT) of distal vein of right lower extremity ([facility]) I82.5Z1    GI bleed K92.2    Anxiety and depression F41.9, F32.9    Chronic anticoagulation Z79.01    COPD exacerbation ([facility]) J44.1    Acute on chronic respiratory failure with hypoxia ([facility]) J96.21    Blood loss anemia D50.0    Sepsis ([facility]) A41.9    Cellulitis of left lower extremity L03.116    Poorly controlled diabetes mellitus ([facility]) E11.65    Lymphedema I89.0    Septicemia ([facility]) H08.9    Acute diastolic congestive heart failure ([facility]) I50.31    Anemia D64.9    Morbid obesity with BMI of 45.0-49.9, adult ([facility]) E66.01, Z68.42    Acute hypoxemic respiratory failure ([facility]) P09.17    Diastolic CHF, acute on chronic ([facility]) I50.33    Cellulitis L03.90    Stress fracture of right fibula M84.363A    Atrial fibrillation, currently in sinus rhythm Z86.79    Gastroesophageal reflux disease without esophagitis K21.9    Ulcers of both lower legs ([facility]) L97.919, L97.929    Chronic respiratory failure with hypoxia ([facility]) J96.11    Abscess and cellulitis of gluteal region L02.31, L03.317    CHF (congestive heart failure), NYHA class I, acute on chronic, combined ([facility]) I50.43    Adrenal mass ([facility]) E27.8    Hyperglycemia R73.9    Atrial fibrillation ([facility]) I48.91    Infection due to extended-spectrum beta-lactamase-producing Escherichia coli A49.8, Z16.12    Bilateral cellulitis of lower leg L03.116, L03.115    Ambulatory dysfunction R26.2    Chronic respiratory failure with hypercapnia ([facility]) J96.12    Moderate pulmonary hypertension ([facility]) I27.20    QT prolongation R94.31    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[facility]) J44.9       Isolation/Infection:   Isolation            Contact          Patient Infection Status       Infection Onset Added Last Indicated Last Indicated By Review Planned Expiration Resolved Resolved By    MRSA  07/12/21 07/12/21 Love , RN        Wound-Right Leg skin 7/5/21. Contact isolation per Dr. Amelia Bautista.     Resolved    COVID-19 Rule Out 08/18/21 08/18/21 08/18/21 Respiratory Panel, Molecular, with COVID-19 (Restricted: peds pts or suitable admitted adults) (Ordered)   08/18/21 Rule-Out Test Resulted    MRSA 07/05/21 07/10/21 07/05/21 Culture, Wound   07/12/21 Collette Oaks, RN    Wound-Right Leg skin 7/5/21. Contained within C/D/I dressing. Standard precautions indicated. COVID-19 Rule Out 05/03/21 05/03/21 05/03/21 COVID-19, Rapid (Ordered)   05/03/21 Rule-Out Test Resulted    ESBL (Extended Spectrum Beta Lactamase) 04/29/21 05/02/21 04/29/21 Culture, Blood 2   07/07/21 Suzette Delong RN    Ecoli blood 4/29/21    COVID-19 Rule Out 04/29/21 04/29/21 04/29/21 COVID-19, Rapid (Ordered)   04/29/21 Rule-Out Test Resulted    COVID-19 Rule Out 01/13/21 01/13/21 01/13/21 Respiratory Panel, Molecular, with COVID-19 (Restricted: peds pts or suitable admitted adults) (Ordered)   01/14/21 Collette Oaks, RN    COVID-19 Rule Out 01/12/21 01/12/21 01/12/21 COVID-19 (Ordered)   01/12/21 Rule-Out Test Resulted    COVID-19 Rule Out 06/02/20 06/02/20 06/02/20 COVID-19 (Ordered)   06/02/20 Rule-Out Test Resulted    COVID-19 Rule Out 05/29/20 05/29/20 05/29/20 COVID-19 (Ordered)   05/29/20 Rule-Out Test Resulted    ESBL (Extended Spectrum Beta Lactamase)  12/04/18 12/04/18 Cady Hurley RN   09/24/19 Cady Hurley RN    E.  Coli, wound-leg,11/30/18            Nurse Assessment:  Last Vital Signs: /69   Pulse 73   Temp 97.2 °F (36.2 °C) (Temporal)   Resp 20   Ht 5' 3\" (1.6 m)   Wt (!) 305 lb 5.4 oz (138.5 kg)   LMP  (LMP Unknown)   SpO2 95%   BMI 54.09 kg/m²     Last documented pain score (0-10 scale): Pain Level: 6  Last Weight:   Wt Readings from Last 1 Encounters:   08/19/21 (!) 305 lb 5.4 oz (138.5 kg)     Mental Status:  oriented and alert    IV Access:  - None    Nursing Mobility/ADLs:  Walking   Assisted  Transfer  Assisted  Bathing  Assisted  Dressing  Assisted  Toileting  Assisted  Feeding  Assisted  Med Admin  Assisted  Med Delivery   whole    Wound Care Documentation and Therapy:  Wound 04/14/21 Leg Lower;Right red, weeping, multiple open areas (Active)   Dressing Status Clean;Dry; Intact 08/23/21 0811   Wound Cleansed Wound cleanser 08/20/21 2230   Dressing/Treatment Petroleum impregnated gauze;ABD;Roll gauze; Ace wrap 08/22/21 1732   Drainage Amount None 08/21/21 2148   Drainage Description Serous 08/20/21 2230   Odor Moderate 08/20/21 2230   Ely-wound Assessment Blanchable erythema 08/21/21 2148   Margins Undefined edges 08/20/21 2230   Number of days: 130       Wound 04/14/21 Leg Left; Lower (Active)   Dressing Status Clean;Dry; Intact 08/23/21 0811   Wound Cleansed Wound cleanser 08/21/21 1545   Dressing/Treatment Petroleum impregnated gauze;ABD;Roll gauze; Ace wrap 08/22/21 1732   Offloading for Diabetic Foot Ulcers Refused 08/21/21 1545   Drainage Amount None 08/21/21 2148   Drainage Description Serous 08/21/21 1545   Odor Malodorous/putrid 08/21/21 1545   Ely-wound Assessment Blanchable erythema 08/21/21 2148   Margins Undefined edges 08/20/21 2230   Number of days: 130       Wound 07/07/21 Leg Lower;Right (Active)   Number of days: 47       Wound 07/07/21 Leg Left; Lower (Active)   Number of days: 47       Wound 08/19/21 Sacrum redness (Active)   Wound Cleansed Soap and water 08/20/21 0730   Dressing/Treatment Other (comment) 08/20/21 0730   Wound Assessment Pink/red;Purple/maroon 08/19/21 0745   Drainage Amount None 08/19/21 0745   Odor None 08/19/21 0745   Ely-wound Assessment Fragile 08/19/21 0745   Number of days: 4        Elimination:  Continence: Bowel: Yes  Bladder: Yes  Urinary Catheter: None   Colostomy/Ileostomy/Ileal Conduit: No       Date of Last BM: ***    Intake/Output Summary (Last 24 hours) at 8/23/2021 1112  Last data filed at 8/23/2021 0556  Gross per 24 hour   Intake 3218.3 ml   Output --   Net 3218.3 ml     I/O last 3 completed shifts: In: 3458.3 [P.O.:1400; I.V.:1958.3; IV Piggyback:100]  Out: -     Safety Concerns:      At Risk for Falls    Impairments/Disabilities: None    Nutrition Therapy:  Current Nutrition Therapy:   - Oral Diet:  Carb Control 3 carbs/meal (1500kcals/day)    Routes of Feeding: Oral  Liquids: Thin Liquids  Daily Fluid Restriction: no  Last Modified Barium Swallow with Video (Video Swallowing Test): not done    Treatments at the Time of Hospital Discharge:   Respiratory Treatments: none  Oxygen Therapy:  is on oxygen at 3 L/min per nasal cannula. Ventilator:    - No ventilator support    Rehab Therapies: Physical Therapy and Occupational Therapy  Weight Bearing Status/Restrictions: No weight bearing restirctions  Other Medical Equipment (for information only, NOT a DME order):  {EQUIPMENT:151477860}  Other Treatments: none    Patient's personal belongings (please select all that are sent with patient):  None    RN SIGNATURE:  Electronically signed by Virgil Ramirez RN on 8/23/21 at 1:48 PM EDT    CASE MANAGEMENT/SOCIAL WORK SECTION    Inpatient Status Date: ***    Readmission Risk Assessment Score:  Readmission Risk              Risk of Unplanned Readmission:  39           Discharging to Facility/ Agency   Name: [facility]  Address: [address]  Phone: [phone]  Fax: [phone]    Dialysis Facility (if applicable)   Name:  Address:  Dialysis Schedule:  Phone:  Fax:    / signature: JADEN Cantrell    PHYSICIAN SECTION    Prognosis: {Prognosis:5326081119}    Condition at Discharge: [address] Patient Condition:131428789}    Rehab Potential (if transferring to Rehab): {Prognosis:6384689284}    Recommended Labs or Other Treatments After Discharge: ***    Physician Certification: I certify the above information and transfer of Deb Horan  is necessary for the continuing treatment of the diagnosis listed and that she requires {Admit to Appropriate Level of Care:25204} for {GREATER/LESS:222778468} 30 days.      Update Admission H&P: {CHP DME Changes in BTIPN:[id number]}    PHYSICIAN SIGNATURE:  {[facility]:217918744}

## 2021-08-23 NOTE — CARE COORDINATION
Social Work Discharge/Plannin ALEKSANDER spoke with liaison Esperanza Franco with [address]  who reports precert is still pending, awaiting auth. Envelope, Hens, and ambulette are on softchart. [facility] spoke with liaison Esperanza Franco who reports precert has been obtained. [facility] is able to transport patient to the facility at 315pm today. ALEKSANDER notified charge RN, bedside RN, and patient. ALEKSANDER/BRISEIDA to follow.     JADEN Keith  [phone]

## 2021-08-23 NOTE — TELEPHONE ENCOUNTER
----- Message -----   From: Radha Sanders MD   Sent: 8/21/2021   9:29 AM EDT   To:  Shelley Valdivia     1 to 2 months with Dr. Shawnee German please

## 2021-08-24 LAB
ALDOSTERONE: 312 NG/DL
CREATININE 24 HOUR URINE: ABNORMAL MG/D (ref 500–1400)
CREATININE URINE: 136 MG/DL
HOURS COLLECTED: ABNORMAL
METANEPHRINE INTREP URINE: ABNORMAL
METANEPHRINE UG/G CRE: 122 UG/G CRT (ref 0–300)
METANEPHRINE, UR-PER VOL: 166 UG/L
METANEPHRINES URINE: ABNORMAL UG/D (ref 36–229)
NORMETANEPHRINE 24 HOUR URINE: ABNORMAL UG/D (ref 95–650)
NORMETANEPHRINE, (G/CRT): 788 UG/G CRT (ref 0–400)
NORMETANEPHRINES, NMOL/L: 1072 UG/L
URINE TOTAL VOLUME: ABNORMAL

## 2021-08-25 LAB
CATECHOLAMINE INTERPRETATION, PLASMA: ABNORMAL
DOPAMINE PLASMA: 87 PG/ML (ref 0–20)
EPINEPHRINE PLASMA: 34 PG/ML (ref 10–200)
NOREPINEPHRINE: 1012 PG/ML (ref 80–520)

## 2021-08-26 LAB
ANION GAP SERPL CALCULATED.3IONS-SCNC: 7 MMOL/L (ref 7–16)
BASOPHILS ABSOLUTE: 0.03 E9/L (ref 0–0.2)
BASOPHILS RELATIVE PERCENT: 0.2 % (ref 0–2)
BUN BLDV-MCNC: 41 MG/DL (ref 6–23)
CALCIUM SERPL-MCNC: 8.5 MG/DL (ref 8.6–10.2)
CHLORIDE BLD-SCNC: 102 MMOL/L (ref 98–107)
CO2: 30 MMOL/L (ref 22–29)
CREAT SERPL-MCNC: 0.9 MG/DL (ref 0.5–1)
EOSINOPHILS ABSOLUTE: 0.06 E9/L (ref 0.05–0.5)
EOSINOPHILS RELATIVE PERCENT: 0.5 % (ref 0–6)
GFR AFRICAN AMERICAN: >60
GFR NON-AFRICAN AMERICAN: >60 ML/MIN/1.73
GLUCOSE BLD-MCNC: 178 MG/DL (ref 74–99)
HCT VFR BLD CALC: 32.6 % (ref 34–48)
HEMOGLOBIN: 9.8 G/DL (ref 11.5–15.5)
IMMATURE GRANULOCYTES #: 0.32 E9/L
IMMATURE GRANULOCYTES %: 2.7 % (ref 0–5)
LYMPHOCYTES ABSOLUTE: 1.07 E9/L (ref 1.5–4)
LYMPHOCYTES RELATIVE PERCENT: 8.9 % (ref 20–42)
MCH RBC QN AUTO: 24.5 PG (ref 26–35)
MCHC RBC AUTO-ENTMCNC: 30.1 % (ref 32–34.5)
MCV RBC AUTO: 81.5 FL (ref 80–99.9)
MONOCYTES ABSOLUTE: 0.59 E9/L (ref 0.1–0.95)
MONOCYTES RELATIVE PERCENT: 4.9 % (ref 2–12)
NEUTROPHILS ABSOLUTE: 9.98 E9/L (ref 1.8–7.3)
NEUTROPHILS RELATIVE PERCENT: 82.8 % (ref 43–80)
PDW BLD-RTO: 18.6 FL (ref 11.5–15)
PLATELET # BLD: 363 E9/L (ref 130–450)
PMV BLD AUTO: 9.6 FL (ref 7–12)
POTASSIUM SERPL-SCNC: 4.4 MMOL/L (ref 3.5–5)
RBC # BLD: 4 E12/L (ref 3.5–5.5)
SODIUM BLD-SCNC: 139 MMOL/L (ref 132–146)
WBC # BLD: 12.1 E9/L (ref 4.5–11.5)

## 2021-08-27 NOTE — TELEPHONE ENCOUNTER
Kirsty Conklin at [facility] notified of Dr. Lacy Douglas recommendation. F/U scheduled for 9/17/21 at 10:40 a.m.

## 2021-08-28 ENCOUNTER — APPOINTMENT (OUTPATIENT)
Dept: GENERAL RADIOLOGY | Age: 66
End: 2021-08-28
Payer: MEDICAID

## 2021-08-28 ENCOUNTER — HOSPITAL ENCOUNTER (EMERGENCY)
Age: 66
Discharge: HOME | End: 2021-08-28
Attending: EMERGENCY MEDICINE
Payer: MEDICAID

## 2021-08-28 VITALS
DIASTOLIC BLOOD PRESSURE: 70 MMHG | TEMPERATURE: 97.5 F | HEART RATE: 90 BPM | SYSTOLIC BLOOD PRESSURE: 117 MMHG | RESPIRATION RATE: 18 BRPM | OXYGEN SATURATION: 98 %

## 2021-08-28 DIAGNOSIS — M53.3 COCCYX PAIN: ICD-10-CM
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
## 2021-08-28 PROCEDURE — 99284 EMERGENCY DEPT VISIT MOD MDM: CPT

## 2021-08-28 PROCEDURE — 72220 X-RAY EXAM SACRUM TAILBONE: CPT

## 2021-08-28 PROCEDURE — 6370000000 HC RX 637 (ALT 250 FOR IP): Performed by: EMERGENCY MEDICINE

## 2021-08-28 PROCEDURE — 72170 X-RAY EXAM OF PELVIS: CPT

## 2021-08-28 RX ORDER — HYDROCODONE BITARTRATE AND ACETAMINOPHEN 5; 325 MG/1; MG/1
2 TABLET ORAL ONCE
Status: COMPLETED | OUTPATIENT
Start: 2021-08-28 | End: 2021-08-28

## 2021-08-28 RX ORDER — HYDROCODONE BITARTRATE AND ACETAMINOPHEN 5; 325 MG/1; MG/1
1 TABLET ORAL EVERY 4 HOURS PRN
Qty: 18 TABLET | Refills: 0 | Status: SHIPPED | OUTPATIENT
Start: 2021-08-28 | End: 2021-09-01

## 2021-08-28 RX ADMIN — HYDROCODONE BITARTRATE AND ACETAMINOPHEN 2 TABLET: 5; 325 TABLET ORAL at 05:15

## 2021-08-28 ASSESSMENT — PAIN DESCRIPTION - ORIENTATION: ORIENTATION: RIGHT

## 2021-08-28 ASSESSMENT — PAIN DESCRIPTION - PAIN TYPE: TYPE: ACUTE PAIN

## 2021-08-28 ASSESSMENT — PAIN SCALES - GENERAL: PAINLEVEL_OUTOF10: 10

## 2021-08-28 ASSESSMENT — PAIN DESCRIPTION - LOCATION: LOCATION: BUTTOCKS;LEG

## 2021-08-28 NOTE — ED NOTES
Pt states she slipped in the bathroom at assisted living. C/o buttock pain. Pt has edema to bilateral legs, but that is chronic. Wounds are seeping. Pt medicated per orders and had xrays completed. Pt awaiting dispo.       Claudia Hankins RN  08/28/21 2257

## 2021-08-30 LAB
ANION GAP SERPL CALCULATED.3IONS-SCNC: 6 MMOL/L (ref 7–16)
BASOPHILS ABSOLUTE: 0.05 E9/L (ref 0–0.2)
BASOPHILS RELATIVE PERCENT: 0.5 % (ref 0–2)
BUN BLDV-MCNC: 44 MG/DL (ref 6–23)
CALCIUM SERPL-MCNC: 8.7 MG/DL (ref 8.6–10.2)
CHLORIDE BLD-SCNC: 96 MMOL/L (ref 98–107)
CO2: 34 MMOL/L (ref 22–29)
CREAT SERPL-MCNC: 0.9 MG/DL (ref 0.5–1)
EOSINOPHILS ABSOLUTE: 0.09 E9/L (ref 0.05–0.5)
EOSINOPHILS RELATIVE PERCENT: 0.9 % (ref 0–6)
GFR AFRICAN AMERICAN: >60
GFR NON-AFRICAN AMERICAN: >60 ML/MIN/1.73
GLUCOSE BLD-MCNC: 130 MG/DL (ref 74–99)
HCT VFR BLD CALC: 27.8 % (ref 34–48)
HEMOGLOBIN: 8.1 G/DL (ref 11.5–15.5)
IMMATURE GRANULOCYTES #: 0.05 E9/L
IMMATURE GRANULOCYTES %: 0.5 % (ref 0–5)
LYMPHOCYTES ABSOLUTE: 1.23 E9/L (ref 1.5–4)
LYMPHOCYTES RELATIVE PERCENT: 12.8 % (ref 20–42)
MCH RBC QN AUTO: 24.4 PG (ref 26–35)
MCHC RBC AUTO-ENTMCNC: 29.1 % (ref 32–34.5)
MCV RBC AUTO: 83.7 FL (ref 80–99.9)
MONOCYTES ABSOLUTE: 0.94 E9/L (ref 0.1–0.95)
MONOCYTES RELATIVE PERCENT: 9.8 % (ref 2–12)
NEUTROPHILS ABSOLUTE: 7.27 E9/L (ref 1.8–7.3)
NEUTROPHILS RELATIVE PERCENT: 75.5 % (ref 43–80)
PDW BLD-RTO: 18.2 FL (ref 11.5–15)
PLATELET # BLD: 326 E9/L (ref 130–450)
PMV BLD AUTO: 10 FL (ref 7–12)
POTASSIUM SERPL-SCNC: 4 MMOL/L (ref 3.5–5)
RBC # BLD: 3.32 E12/L (ref 3.5–5.5)
RENIN ACTIVITY: 31.2 NG/ML/HR
SODIUM BLD-SCNC: 136 MMOL/L (ref 132–146)
WBC # BLD: 9.6 E9/L (ref 4.5–11.5)

## 2021-08-30 NOTE — ED PROVIDER NOTES
HPI:  21,   Time: 11:01 AM EDT       Narinder Lujan is a 77 y.o. female presenting to the ED for fall, beginning just prior to arrival.  The complaint has been intermittent, mild in severity, and worsened by nothing. The patient states that she was ambulating and her assisted living facility with her foot slipped on the floor causing her to lose balance falling onto her buttocks. She denies hitting her head. No LOC. She states that since her fall she has been having pain over her coccyx. Otherwise she feels well. Denies any syncope. No chest pain shortness breath. No numbness tingling. No headaches or vision changes. No fevers or chills. Review of Systems:   Pertinent positives and negatives are stated within HPI, all other systems reviewed and are negative.          --------------------------------------------- PAST HISTORY ---------------------------------------------  Past Medical History:  has a past medical history of (HFpEF) heart failure with preserved ejection fraction ([address] Anal fissure, Anemia, Anxiety and depression, Arthritis, Asthma, Atrial fibrillation ([address] Blood transfusion, CHF (congestive heart failure) ([address] COPD (chronic obstructive pulmonary disease) ([facility] Disc disorder, DM2 (diabetes mellitus, type 2) ([facility] Fall due to stumbling, GERD (gastroesophageal reflux disease), History of blood transfusion, Hx of blood clots, Hyperlipidemia, Hypertension, Inappropriate sinus tachycardia, LVH (left ventricular hypertrophy), Lymphadenitis, chronic, Occipital neuralgia, Respiratory acidosis, and Sinus tachycardia. Past Surgical History:  has a past surgical history that includes Tonsillectomy (); Appendectomy ();  section ();  section ();  section (); Anus surgery (2006); Upper gastrointestinal endoscopy (2004); Colonoscopy (2004);  Colonoscopy (2006); anoscopy (10/25/2006); anoscopy (2007); anoscopy (2007); Colonoscopy (3/23/2012); Anus surgery (4/18/2012); Upper gastrointestinal endoscopy (N/A, 6/1/2018); Upper gastrointestinal endoscopy (N/A, 11/9/2018); pr debridement, skin, sub-q tissue,muscle,=<20 sq cm (Left, 11/30/2018); Endoscopy, colon, diagnostic; fracture surgery; and joint replacement (2003). Social History:  reports that she quit smoking about 16 years ago. Her smoking use included cigarettes. She started smoking about 41 years ago. She has a 37.50 pack-year smoking history. She has never used smokeless tobacco. She reports previous alcohol use. She reports previous drug use. Family History: family history includes Colon Cancer in her father and sister; Diabetes in her father, paternal aunt, paternal aunt, paternal uncle, paternal uncle, and sister; Heart Disease in her mother; Other in her father and sister. The patients home medications have been reviewed. Allergies: Statins        ---------------------------------------------------PHYSICAL EXAM--------------------------------------    Constitutional/General: Alert and oriented x3, non toxic in NAD  Head: Normocephalic and atraumatic  Eyes: PERRL, EOMI, conjunctive normal, sclera non icteric  Mouth: Oropharynx clear, handling secretions, no trismus, no asymmetry of the posterior oropharynx or uvular edema  Neck: Supple, full ROM, non tender to palpation in the midline, no stridor, no crepitus, no meningeal signs  Respiratory: Lungs clear to auscultation bilaterally, no wheezes, rales, or rhonchi. Not in respiratory distress  Cardiovascular:  Regular rate. Regular rhythm. No murmurs, gallops, or rubs. 2+ distal pulses  GI:  Abdomen Soft, Non tender, Non distended. +BS. No organomegaly, no palpable masses,  No rebound, guarding, or rigidity. Musculoskeletal: Moves all extremities x 4. Warm and well perfused, no clubbing, cyanosis. Chronic lymphedema noted to bilateral lower extremities with overlying skin changes.   No purulent drainage or signs of secondary infection at this time capillary refill <3 seconds. No midline thoracic or lumbar tenderness. No step-offs. Patient did not pain to palpation over the coccyx. Integument: skin warm and dry. Chronic skin changes bilateral lower extremities  Neurologic: GCS 15, no focal deficits, symmetric strength 5/5 in the upper and lower extremities bilaterally, sensation intact all 4 extremities, cranial nerves II to XII intact  Psychiatric: Normal Affect    -------------------------------------------------- RESULTS -------------------------------------------------  I have personally reviewed all laboratory and imaging results for this patient. Results are listed below. LABS:  No results found for this visit on 08/28/21. RADIOLOGY:  Interpreted by Radiologist.  XR PELVIS (1-2 VIEWS)   Final Result   No acute abnormality of the pelvis. XR SACRUM COCCYX (MIN 2 VIEWS)   Final Result   1. No evidence of acute osseous abnormality. 2. Degenerative change.                 ------------------------- NURSING NOTES AND VITALS REVIEWED ---------------------------   The nursing notes within the ED encounter and vital signs as below have been reviewed by myself. /70   Pulse 90   Temp 97.5 °F (36.4 °C)   Resp 18   LMP  (LMP Unknown)   SpO2 98%   Oxygen Saturation Interpretation: Normal    The patients available past medical records and past encounters were reviewed. ------------------------------ ED COURSE/MEDICAL DECISION MAKING----------------------  Medications   HYDROcodone-acetaminophen (NORCO) 5-325 MG per tablet 2 tablet (2 tablets Oral Given 8/28/21 0515)         ED COURSE:       Medical Decision Making: This is a 28-year-old female who presents to the ED after mechanical fall. Patient's physical exam benign from a traumatic standpoint except for pain over the coccyx. Patient's imaging showed no acute injury. Patient given Norco with improvement of pain.   Patient does have chronic lymphedema and states she has been monitored closely for her lower extremity edema and skin changes. Patient otherwise appears nontoxic. Vital signs stable. Patient was discharged home with close follow-up. Return precautions given. Patient agrees with plan. I, Dr. Evelio Weldon, am the primary provider for this encounter    This patient's ED course included: a personal history and physicial examination, re-evaluation prior to disposition and multiple bedside re-evaluations    This patient has remained hemodynamically stable during their ED course. Re-Evaluations:             Re-evaluation. Patients symptoms are improving      Counseling: The emergency provider has spoken with the patient and discussed todays results, in addition to providing specific details for the plan of care and counseling regarding the diagnosis and prognosis. Questions are answered at this time and they are agreeable with the plan.       --------------------------------- IMPRESSION AND DISPOSITION ---------------------------------    IMPRESSION  1. Fall, initial encounter    2. Coccyx pain        DISPOSITION  Disposition: Discharge to home  Patient condition is stable    NOTE: This report was transcribed using voice recognition software.  Every effort was made to ensure accuracy; however, inadvertent computerized transcription errors may be present        Angie Scott DO  08/30/21 1108

## 2021-08-31 ENCOUNTER — HOSPITAL ENCOUNTER (EMERGENCY)
Age: 66
Discharge: NURSING, REHAB OR LONG-TERM CARE | End: 2021-09-01
Attending: EMERGENCY MEDICINE
Payer: MEDICAID

## 2021-08-31 ENCOUNTER — APPOINTMENT (OUTPATIENT)
Dept: GENERAL RADIOLOGY | Age: 66
End: 2021-08-31
Payer: MEDICAID

## 2021-08-31 DIAGNOSIS — F39 MOOD DISORDER (HCC): Primary | ICD-10-CM

## 2021-08-31 PROCEDURE — 72170 X-RAY EXAM OF PELVIS: CPT

## 2021-08-31 PROCEDURE — 99285 EMERGENCY DEPT VISIT HI MDM: CPT

## 2021-08-31 PROCEDURE — 80053 COMPREHEN METABOLIC PANEL: CPT

## 2021-08-31 PROCEDURE — 80143 DRUG ASSAY ACETAMINOPHEN: CPT

## 2021-08-31 PROCEDURE — 80179 DRUG ASSAY SALICYLATE: CPT

## 2021-08-31 PROCEDURE — 36415 COLL VENOUS BLD VENIPUNCTURE: CPT

## 2021-08-31 PROCEDURE — 85025 COMPLETE CBC W/AUTO DIFF WBC: CPT

## 2021-08-31 PROCEDURE — 82077 ASSAY SPEC XCP UR&BREATH IA: CPT

## 2021-08-31 PROCEDURE — 80307 DRUG TEST PRSMV CHEM ANLYZR: CPT

## 2021-09-01 VITALS
HEIGHT: 63 IN | SYSTOLIC BLOOD PRESSURE: 145 MMHG | HEART RATE: 84 BPM | BODY MASS INDEX: 51.91 KG/M2 | DIASTOLIC BLOOD PRESSURE: 76 MMHG | TEMPERATURE: 98.1 F | RESPIRATION RATE: 16 BRPM | OXYGEN SATURATION: 94 % | WEIGHT: 293 LBS

## 2021-09-01 LAB
ACETAMINOPHEN LEVEL: <5 MCG/ML (ref 10–30)
ALBUMIN SERPL-MCNC: 3 G/DL (ref 3.5–5.2)
ALP BLD-CCNC: 139 U/L (ref 35–104)
ALT SERPL-CCNC: 23 U/L (ref 0–32)
AMPHETAMINE SCREEN, URINE: NOT DETECTED
ANION GAP SERPL CALCULATED.3IONS-SCNC: 1 MMOL/L (ref 7–16)
ANISOCYTOSIS: ABNORMAL
AST SERPL-CCNC: 21 U/L (ref 0–31)
BARBITURATE SCREEN URINE: NOT DETECTED
BASOPHILS ABSOLUTE: 0.12 E9/L (ref 0–0.2)
BASOPHILS RELATIVE PERCENT: 0.9 % (ref 0–2)
BENZODIAZEPINE SCREEN, URINE: NOT DETECTED
BILIRUB SERPL-MCNC: 0.2 MG/DL (ref 0–1.2)
BILIRUBIN URINE: NEGATIVE
BLOOD, URINE: NEGATIVE
BUN BLDV-MCNC: 44 MG/DL (ref 6–23)
CALCIUM SERPL-MCNC: 8.9 MG/DL (ref 8.6–10.2)
CANNABINOID SCREEN URINE: NOT DETECTED
CHLORIDE BLD-SCNC: 97 MMOL/L (ref 98–107)
CLARITY: CLEAR
CO2: 41 MMOL/L (ref 22–29)
COCAINE METABOLITE SCREEN URINE: NOT DETECTED
COLOR: ABNORMAL
CREAT SERPL-MCNC: 1 MG/DL (ref 0.5–1)
EOSINOPHILS ABSOLUTE: 0.12 E9/L (ref 0.05–0.5)
EOSINOPHILS RELATIVE PERCENT: 0.9 % (ref 0–6)
ETHANOL: <10 MG/DL (ref 0–0.08)
FENTANYL SCREEN, URINE: NOT DETECTED
GFR AFRICAN AMERICAN: >60
GFR NON-AFRICAN AMERICAN: 55 ML/MIN/1.73
GLUCOSE BLD-MCNC: 143 MG/DL (ref 74–99)
GLUCOSE URINE: NEGATIVE MG/DL
HCT VFR BLD CALC: 28 % (ref 34–48)
HEMOGLOBIN: 8.1 G/DL (ref 11.5–15.5)
HYPOCHROMIA: ABNORMAL
KETONES, URINE: NEGATIVE MG/DL
LEUKOCYTE ESTERASE, URINE: NEGATIVE
LYMPHOCYTES ABSOLUTE: 0.78 E9/L (ref 1.5–4)
LYMPHOCYTES RELATIVE PERCENT: 6.1 % (ref 20–42)
Lab: ABNORMAL
MCH RBC QN AUTO: 23.8 PG (ref 26–35)
MCHC RBC AUTO-ENTMCNC: 28.9 % (ref 32–34.5)
MCV RBC AUTO: 82.4 FL (ref 80–99.9)
METER GLUCOSE: 211 MG/DL (ref 74–99)
METER GLUCOSE: 67 MG/DL (ref 74–99)
METHADONE SCREEN, URINE: NOT DETECTED
MONOCYTES ABSOLUTE: 1.04 E9/L (ref 0.1–0.95)
MONOCYTES RELATIVE PERCENT: 7.9 % (ref 2–12)
NEUTROPHILS ABSOLUTE: 10.92 E9/L (ref 1.8–7.3)
NEUTROPHILS RELATIVE PERCENT: 84.2 % (ref 43–80)
NITRITE, URINE: NEGATIVE
OPIATE SCREEN URINE: POSITIVE
OVALOCYTES: ABNORMAL
OXYCODONE URINE: NOT DETECTED
PDW BLD-RTO: 18.1 FL (ref 11.5–15)
PH UA: 6 (ref 5–9)
PHENCYCLIDINE SCREEN URINE: NOT DETECTED
PLATELET # BLD: 359 E9/L (ref 130–450)
PMV BLD AUTO: 9.1 FL (ref 7–12)
POIKILOCYTES: ABNORMAL
POLYCHROMASIA: ABNORMAL
POTASSIUM SERPL-SCNC: 4.6 MMOL/L (ref 3.5–5)
PROTEIN UA: NEGATIVE MG/DL
RBC # BLD: 3.4 E12/L (ref 3.5–5.5)
SALICYLATE, SERUM: <0.3 MG/DL (ref 0–30)
SARS-COV-2, NAAT: NOT DETECTED
SODIUM BLD-SCNC: 139 MMOL/L (ref 132–146)
SPECIFIC GRAVITY UA: 1.01 (ref 1–1.03)
TOTAL PROTEIN: 6.9 G/DL (ref 6.4–8.3)
TRICYCLIC ANTIDEPRESSANTS SCREEN SERUM: NEGATIVE NG/ML
UROBILINOGEN, URINE: 0.2 E.U./DL
WBC # BLD: 13 E9/L (ref 4.5–11.5)

## 2021-09-01 PROCEDURE — 80307 DRUG TEST PRSMV CHEM ANLYZR: CPT

## 2021-09-01 PROCEDURE — 81003 URINALYSIS AUTO W/O SCOPE: CPT

## 2021-09-01 PROCEDURE — 82962 GLUCOSE BLOOD TEST: CPT

## 2021-09-01 PROCEDURE — 87635 SARS-COV-2 COVID-19 AMP PRB: CPT

## 2021-09-01 RX ORDER — INSULIN LISPRO 100 [IU]/ML
35 INJECTION, SOLUTION INTRAVENOUS; SUBCUTANEOUS
COMMUNITY
End: 2022-03-08

## 2021-09-01 RX ORDER — FERROUS SULFATE 325(65) MG
325 TABLET ORAL 2 TIMES DAILY
Status: ON HOLD | COMMUNITY
End: 2022-03-22 | Stop reason: HOSPADM

## 2021-09-01 RX ORDER — OMEPRAZOLE 40 MG/1
40 CAPSULE, DELAYED RELEASE ORAL DAILY
Status: ON HOLD | COMMUNITY
End: 2022-10-04

## 2021-09-01 RX ORDER — ACETAMINOPHEN 325 MG/1
650 TABLET ORAL EVERY 4 HOURS PRN
COMMUNITY
End: 2022-03-08 | Stop reason: ALTCHOICE

## 2021-09-01 RX ORDER — BISACODYL 10 MG
10 SUPPOSITORY, RECTAL RECTAL DAILY PRN
Status: ON HOLD | COMMUNITY
End: 2022-03-22 | Stop reason: HOSPADM

## 2021-09-01 RX ORDER — M-VIT,TX,IRON,MINS/CALC/FOLIC 27MG-0.4MG
1 TABLET ORAL DAILY
Status: ON HOLD | COMMUNITY
End: 2022-03-22 | Stop reason: HOSPADM

## 2021-09-01 NOTE — DISCHARGE INSTR - COC
Continuity of Care Form    Patient Name: Medina Ortiz   :  1955  MRN:  [id number]    Admit date:  2021  Discharge date:  ***    Code Status Order: Prior   Advance Directives:     Admitting Physician:  No admitting provider for patient encounter. PCP: Luciano Colindres    Discharging Nurse: [facility] Unit/Room#: ROME F/  Discharging Unit Phone Number: ***    Emergency Contact:   Extended Emergency Contact Information  Primary Emergency Contact: Litzy Piña Bottoms of [facility] Phone: [phone]  Mobile Phone: [phone]  Relation: Child   needed? No  Secondary Emergency Contact: Marcy Epstein  Address: [address] [place] [address] Phone: [phone]  Mobile Phone: [phone]  Relation: Child   needed? No    Past Surgical History:  Past Surgical History:   Procedure Laterality Date    ANOSCOPY  10/25/2006    injection of anal sphincter with Botox, Franklyn Ortiz/Timmy, [facility]    ANOSCOPY  2007    injection of anal sphincter with Botox, Dr. Reid, [address] [place] [address] ANOSCOPY  2007    injection of anal sphincter with Botox, Franklyn Sanchez [facility]    ANUS SURGERY  2006    Lateral sphincterotomy for chronic anal fissure, Dr. Ebenezer Gamez, [facility]  2012    anal exam and injection of Botox 100 units into anal sphincter, Laila Sanchez, [facility]    APPENDECTOMY  1965   [address]    COLONOSCOPY  2004    cecal polyp, bx (no pathologic changes), Dr. Darion Mansfield, [address] [place] [address] COLONOSCOPY  2006    snare polypectomy terminal ileum polyp (granulation tissue polyp) and anal polyp (inflammatory/papilla), Dr. Reid, [address] [place] [address] COLONOSCOPY  3/23/2012    bx/cauterization proximal ascending colon polyp, injection of anal sphincter with Botox, Dr. Reid, [facility]    ENDOSCOPY, COLON, DIAGNOSTIC      FRACTURE SURGERY      R leg fracture with surgery for repair    JOINT REPLACEMENT  2003    L knee    AZ DEBRIDEMENT, SKIN, SUB-Q TISSUE,MUSCLE,=<20 SQ CM Left 11/30/2018    LEFT LEG EXCISIONAL  DEBRIDEMENT WITH TISSUE BIOPSY performed by Asa Neves DPM at [address] ENDOSCOPY  1/20/2004    gastritis, bx (no H pylori), Dr. Alea Navarro, [address] [facility] 10 GASTROINTESTINAL ENDOSCOPY N/A 6/1/2018    EGD BIOPSY performed by Alec Palmer MD at [address] [facility] N/A 11/9/2018    EGD BIOPSY performed by Alec Palmer MD at [facility] ENDOSCOPY       Immunization History:   Immunization History   Administered Date(s) Administered    Influenza 10/26/2012    Influenza Virus Vaccine 10/08/2013, 09/04/2015    Influenza, High Dose (Fluzone 65 yrs and older) 11/07/2019    Influenza, Mitzi Hurt, IM, PF (6 mo and older Fluzone, Flulaval, Fluarix, and 3 yrs and older Afluria) 10/24/2016, 12/14/2017    Pneumococcal Polysaccharide (Zcenakccq84) 09/04/2015    Zoster Live (Zostavax) 01/02/2015       Active Problems:  Patient Active Problem List   Diagnosis Code    Uncontrolled diabetes mellitus ([facility] E11.65    Depression F32.9    Essential hypertension I10    Hyperlipidemia E78.5    Osteoarthritis M19.90    PAF (paroxysmal atrial fibrillation) ([address] - currently in SR I48.0    Pulmonary hypertension I27.20    Chronic sinusitis J32.9    Chronic pain G89.29    Steatohepatitis K75.81    Baker's cyst of knee M71.20    Diabetic neuropathy ([address] E11.40    Iron deficiency E61.1    LVH (left ventricular hypertrophy) I51.7    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    Positive hepatitis C antibody test R76.8    PFO (patent foramen ovale) Q21.1    YEIMI (acute kidney injury) ([address] N17.9    Chronic diastolic heart failure (HCC) I50.32    Physical deconditioning R53.81    Lymphedema of both lower extremities I89.0    Chronic anemia D64.9    Dyspnea on exertion R06.00  Chronic deep vein thrombosis (DVT) of distal vein of right lower extremity ([facility]) I82.5Z1    GI bleed K92.2    Anxiety and depression F41.9, F32.9    Chronic anticoagulation Z79.01    COPD exacerbation ([facility]) J44.1    Acute on chronic respiratory failure with hypoxia ([facility]) J96.21    Blood loss anemia D50.0    Cellulitis of left lower extremity L03. 116    Poorly controlled diabetes mellitus ([facility] E11.65    Lymphedema I89.0    Septicemia ([facility]) A41.9    Acute diastolic congestive heart failure ([facility]) I50.31    Anemia D64.9    Morbid obesity with BMI of 45.0-49.9, adult ([facility]) E66.01, Z68.42    Acute hypoxemic respiratory failure ([facility]) X78.51    Diastolic CHF, acute on chronic ([facility]) I50.33    Cellulitis L03.90    Stress fracture of right fibula M84.363A    Atrial fibrillation, currently in sinus rhythm Z86.79    Gastroesophageal reflux disease without esophagitis K21.9    Ulcers of both lower legs ([facility]) L97.919, L97.929    Chronic respiratory failure with hypoxia ([facility]) J96.11    Abscess and cellulitis of gluteal region L02.31, L03.317    CHF (congestive heart failure), NYHA class I, acute on chronic, combined ([facility]) I50.43    Adrenal mass ([facility]) E27.8    Hyperglycemia R73.9    Atrial fibrillation ([facility]) I48.91    Infection due to extended-spectrum beta-lactamase-producing Escherichia coli A49.8, Z16.12    Bilateral cellulitis of lower leg L03.116, L03.115    Ambulatory dysfunction R26.2    Chronic respiratory failure with hypercapnia ([facility]) J96.12    Moderate pulmonary hypertension ([facility]) I27.20    QT prolongation R94.31    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[facility]) J44.9       Isolation/Infection:   Isolation          No Isolation        Patient Infection Status     Infection Onset Added Last Indicated Last Indicated By Review Planned Expiration Resolved Resolved By    MRSA  07/12/21 07/12/21 Arely Andrew RN        Wound-Right Leg skin 7/5/21. Contact isolation per Dr. Elizabeth Thomas.     Resolved    COVID-19 Rule Out 08/18/21 08/18/21 08/18/21 Respiratory Panel, Molecular, with COVID-19 (Restricted: peds pts or suitable admitted adults) (Ordered)   08/18/21 Rule-Out Test Resulted    MRSA 07/05/21 07/10/21 07/05/21 Culture, Wound   07/12/21 Marie Cordova RN    Wound-Right Leg skin 7/5/21. Contained within C/D/I dressing. Standard precautions indicated. COVID-19 Rule Out 05/03/21 05/03/21 05/03/21 COVID-19, Rapid (Ordered)   05/03/21 Rule-Out Test Resulted    ESBL (Extended Spectrum Beta Lactamase) 04/29/21 05/02/21 04/29/21 Culture, Blood 2   07/07/21 BERNABE Bryant blood 4/29/21    COVID-19 Rule Out 04/29/21 04/29/21 04/29/21 COVID-19, Rapid (Ordered)   04/29/21 Rule-Out Test Resulted    COVID-19 Rule Out 01/13/21 01/13/21 01/13/21 Respiratory Panel, Molecular, with COVID-19 (Restricted: peds pts or suitable admitted adults) (Ordered)   01/14/21 Marie Cordova RN    COVID-19 Rule Out 01/12/21 01/12/21 01/12/21 COVID-19 (Ordered)   01/12/21 Rule-Out Test Resulted    COVID-19 Rule Out 06/02/20 06/02/20 06/02/20 COVID-19 (Ordered)   06/02/20 Rule-Out Test Resulted    COVID-19 Rule Out 05/29/20 05/29/20 05/29/20 COVID-19 (Ordered)   05/29/20 Rule-Out Test Resulted    ESBL (Extended Spectrum Beta Lactamase)  12/04/18 12/04/18 Lisa Sullivan RN   09/24/19 Lisa Sullivan RN    E.  Coli, wound-leg,11/30/18          Nurse Assessment:  Last Vital Signs: BP (!) 160/76   Pulse 84   Temp 98.1 °F (36.7 °C) (Oral)   Resp 16   Ht 5' 3\" (1.6 m)   Wt (!) 304 lb (137.9 kg)   LMP  (LMP Unknown)   SpO2 97%   BMI 53.85 kg/m²     Last documented pain score (0-10 scale):    Last Weight:   Wt Readings from Last 1 Encounters:   08/31/21 (!) 304 lb (137.9 kg)     Mental Status:  {IP PT MENTAL STATUS:20030}    IV Access:  {[facility] IV ACCESS:700375294}    Nursing Mobility/ADLs:  Walking   {CHP DME LABB:480633920}  Transfer  {CHP DME BYXV:455976498}  Bathing  {CHP DME GNYS:561072242}  Dressing  {CHP DME SFNA:571026233}  Toileting  {CHP DME HWGU:978379103}  Feeding  {CHP DME ABLR:163861513}  Med Admin  {CHP DME BCHY:386879695}  Med Delivery   {[facility] Delivery:241285970}    Wound Care Documentation and Therapy:  Wound 04/14/21 Leg Lower;Right (Active)   Wound Image   08/23/21 1120   Dressing Status New dressing applied 08/23/21 1120   Wound Cleansed Cleansed with saline 08/23/21 1120   Dressing/Treatment ABD; Alginate;Roll gauze 08/23/21 1120   Wound Length (cm) 5 cm 08/23/21 1120   Wound Width (cm) 8 cm 08/23/21 1120   Wound Depth (cm) 0.1 cm 08/23/21 1120   Wound Surface Area (cm^2) 40 cm^2 08/23/21 1120   Wound Volume (cm^3) 4 cm^3 08/23/21 1120   Wound Assessment Pink/red 08/23/21 1120   Drainage Amount Moderate 08/23/21 1120   Drainage Description Yellow 08/23/21 1120   Odor Mild 08/23/21 1120   Ely-wound Assessment Blanchable erythema 08/21/21 2148   Margins Undefined edges 08/20/21 2230   Number of days: 139       Wound 04/14/21 Leg Left; Lower (Active)   Wound Image     08/23/21 1120   Dressing Status New dressing applied 08/23/21 1120   Wound Cleansed Cleansed with saline 08/23/21 1120   Dressing/Treatment ABD; Alginate;Roll gauze 08/23/21 1120   Offloading for Diabetic Foot Ulcers Refused 08/21/21 1545   Wound Length (cm) 12 cm 08/23/21 1120   Wound Width (cm) 45 cm 08/23/21 1120   Wound Depth (cm) 0.1 cm 08/23/21 1120   Wound Surface Area (cm^2) 540 cm^2 08/23/21 1120   Wound Volume (cm^3) 54 cm^3 08/23/21 1120   Wound Assessment Pink/red 08/23/21 1120   Drainage Amount Moderate 08/23/21 1120   Drainage Description Yellow 08/23/21 1120   Odor Mild 08/23/21 1120   Ely-wound Assessment Blanchable erythema 08/21/21 2148   Margins Undefined edges 08/20/21 2230   Number of days: 139       Wound 07/07/21 Leg Lower;Right (Active)   Number of days: 56       Wound 07/07/21 Leg Left; Lower (Active)   Number of days: 56       Wound 08/19/21 Sacrum redness (Active)   Wound Cleansed Soap and water 08/20/21 0730 Dressing/Treatment Other (comment) 08/20/21 0730   Wound Assessment Pink/red;Purple/maroon 08/19/21 0745   Drainage Amount None 08/19/21 0745   Odor None 08/19/21 0745   Ely-wound Assessment Fragile 08/19/21 0745   Number of days: 13       Wound 08/23/21 Leg Medial;Lower;Right (Active)   Wound Image   08/23/21 1120   Dressing Status New dressing applied 08/23/21 1120   Wound Cleansed Cleansed with saline 08/23/21 1120   Dressing/Treatment ABD; Alginate;Roll gauze 08/23/21 1120   Wound Length (cm) 5 cm 08/23/21 1120   Wound Width (cm) 6 cm 08/23/21 1120   Wound Depth (cm) 0.1 cm 08/23/21 1120   Wound Surface Area (cm^2) 30 cm^2 08/23/21 1120   Wound Volume (cm^3) 3 cm^3 08/23/21 1120   Wound Assessment Pink/red 08/23/21 1120   Drainage Amount Moderate 08/23/21 1120   Drainage Description Yellow 08/23/21 1120   Odor Mild 08/23/21 1120   Number of days: 9        Elimination:  Continence:   · Bowel: {YES / HM:43065}  · Bladder: {YES / XQ:63287}  Urinary Catheter: {Urinary Catheter:582159370}   Colostomy/Ileostomy/Ileal Conduit: {YES / WM:57098}       Date of Last BM: ***  No intake or output data in the 24 hours ending 09/01/21 1352  No intake/output data recorded.     Safety Concerns:     [employer] Safety Concerns:857923310}    Impairments/Disabilities:      [employer] Impairments/Disabilities:314012179}    Nutrition Therapy:  Current Nutrition Therapy:   [employer] Diet List:871665540}    Routes of Feeding: {CHP DME Other Feedings:145363327}  Liquids: {Slp liquid thickness:43484}  Daily Fluid Restriction: {CHP DME Yes amt example:209308050}  Last Modified Barium Swallow with Video (Video Swallowing Test): {Done Not Done UAZV:071104906}    Treatments at the Time of Hospital Discharge:   Respiratory Treatments: ***  Oxygen Therapy:  {Therapy; copd oxygen:52868}  Ventilator:    {MH CC Vent PMBQ:068773685}    Rehab Therapies: {THERAPEUTIC INTERVENTION:2891412045}  Weight Bearing Status/Restrictions: {ROBBI MCCLELLAND Weight Bearin}  Other Medical Equipment (for information only, NOT a DME order):  {EQUIPMENT:522300425}  Other Treatments: ***    Patient's personal belongings (please select all that are sent with patient):  {CHP DME Belongings:667665942}    RN SIGNATURE:  {Esignature:686660532}    CASE MANAGEMENT/SOCIAL WORK SECTION    Inpatient Status Date: ***    Readmission Risk Assessment Score:  Readmission Risk              Risk of Unplanned Readmission:  0           Discharging to Facility/ Agency   · Name:   · Address:  · Phone:  · Fax:    Dialysis Facility (if applicable)   · Name:  · Address:  · Dialysis Schedule:  · Phone:  · Fax:    / signature: {Esignature:541050920}    PHYSICIAN SECTION    Prognosis: {Prognosis:1790472955}    Condition at Discharge: [address] [place] [address] Patient Condition:930658017}    Rehab Potential (if transferring to Rehab): {Prognosis:3654685120}    Recommended Labs or Other Treatments After Discharge: ***    Physician Certification: I certify the above information and transfer of Santos Salazar  is necessary for the continuing treatment of the diagnosis listed and that she requires {Admit to Appropriate Level of Care:61388} for {GREATER/LESS:286596437} 30 days.      Update Admission H&P: [facility]}    PHYSICIAN SIGNATURE:  {Esignature:140197431}

## 2021-09-01 NOTE — PROGRESS NOTES
Complete bed change x 2. Pt is alert and aware. Pt ate lunch tray and 2 juices. 3 l o2on and o2 tank changed. Pt also had  a large BM on a bedpan.

## 2021-09-01 NOTE — ED NOTES
Emergency Department [facility] AN AFFILIATE OF [facility] Biopsychosocial Assessment Note    Chief Complaint:     Pt is a 78 yo female presenting to the ED from her [facility] for a psych eval,  sent in by [facility] for psych eval. per ems she was sent in because she is belligerent with staff and sitting herself on the the floor for behavior. pt denies si/hi upon arrival     MSE:    Pt is calm, oriented x 4, alert, congruent affect, fair eye contact, clear speech pattern, denies SI, HI and AVH, Pt reports ok sleep and appetite. Clinical Summary/History: According to Pt's chart, Pt has a MH hx of MDD and anxiety, Pt is reports she takes her meds but can not remember who [place] [address] services. Pt reports that she fell on her buttocks last night. Pt reports this was not intentional, but they are sore right now. Pt reports no MH complants. Pt reports she was upset with staff due to they would not help her up in a timely manner and they accused her of doing it on purpose. Pt denies SI, HI and AVH. According to Pt's RN's she has been cooperative and pleasant since arrival    Gender  [] Male [x] Female [] Transgender  [] Other    Sexual Orientation    [] Heterosexual [] Homosexual [] Bisexual [] Other  unlnown    Suicidal Behavioral: CSSR-S Complete. [] Reports:    [] Past [] Present   [x] Denies    Homicidal/ Violent Behavior  [] Reports:   [] Past [] Present   [x] Denies     Hallucinations/Delusions   [] Reports:   [x] Denies     Substance Use/Alcohol Use/Addiction: SBIRT Screen Complete. [] Reports:   [x] Denies     Trauma History  [] Reports:  unknown  [] Denies     Collateral Information:     ALMITA SW spoke with Jim Becker RN at [facility] rehab facility [facility] who informed that Pt is currently on IV antibiotics and will be until Sept 4-6th.  Pt has been exhibiting behavioral issues at shift change every night at 7pm. Pt is able to walk, go to the bath move around as she pleases during the day but after 7pm she can do none of this on her own and get upset with the staff when they don't help her. Pt ended up sitting on the floor for 1 1/2 hours due to not being able to roll on her side for the lift to get under her to help her back in bed due to her size. Level of Care/Disposition Plan  [] Home:   [] Outpatient Provider:   [] Crisis Unit:   [] Inpatient Psychiatric Unit:  [x] Other:  Return to rehab    ALMITA SW reviewed Pt chart with ED Doc, Pt does not meet inpatient criteria. Pt will be discharged back to her rehab facility with a follow up request for psych services there.        DHRUV Borrego, JADEN  09/01/21 [address] [place] [address], MSW, JADEN  09/01/21 8041

## 2021-09-01 NOTE — ED PROVIDER NOTES
HPI:  21, Time: 11:18 PM EDT         Millie Dandy is a 77 y.o. female presenting to the ED for psychiatric evaluation, beginning 1 day ago. The complaint has been persistent, mild in severity, and worsened by nothing. Per report from EMS. Patient reportedly is being belligerent and purposely sliding out of bed and landing on buttocks. Patient denying this. Patient reporting she has fallen she reports no head or neck injury she reports no chest pain or abdominal pain she reports no vomiting or diarrhea she reports no homicidal suicidal thoughts. Patient does have a history of being on anticoagulation she has a history of atrial fibrillation. Patient no new weakness she does have a history of MRSA. Patient has dressings to her lower extremities. Patient currently on IV antibiotics for wound. ROS:   Pertinent positives and negatives are stated within HPI, all other systems reviewed and are negative.  --------------------------------------------- PAST HISTORY ---------------------------------------------  Past Medical History:  has a past medical history of (HFpEF) heart failure with preserved ejection fraction ([address] Anal fissure, Anemia, Anxiety and depression, Arthritis, Asthma, Atrial fibrillation ([address] Blood transfusion, CHF (congestive heart failure) ([address] COPD (chronic obstructive pulmonary disease) ([facility] Disc disorder, DM2 (diabetes mellitus, type 2) ([facility] [address] [facility] Fall due to stumbling, GERD (gastroesophageal reflux disease), History of blood transfusion, Hx of blood clots, Hyperlipidemia, Hypertension, Inappropriate sinus tachycardia, LVH (left ventricular hypertrophy), Lymphadenitis, chronic, Occipital neuralgia, Respiratory acidosis, and Sinus tachycardia. Past Surgical History:  has a past surgical history that includes Tonsillectomy (); Appendectomy ();  section ();  section ();  section (); Anus surgery (2006);  Upper gastrointestinal endoscopy (1/20/2004); Colonoscopy (1/20/2004); Colonoscopy (8/11/2006); anoscopy (10/25/2006); anoscopy (4/9/2007); anoscopy (6/13/2007); Colonoscopy (3/23/2012); Anus surgery (4/18/2012); Upper gastrointestinal endoscopy (N/A, 6/1/2018); Upper gastrointestinal endoscopy (N/A, 11/9/2018); pr debridement, skin, sub-q tissue,muscle,=<20 sq cm (Left, 11/30/2018); Endoscopy, colon, diagnostic; fracture surgery; and joint replacement (2003). Social History:  reports that she quit smoking about 16 years ago. Her smoking use included cigarettes. She started smoking about 41 years ago. She has a 37.50 pack-year smoking history. She has never used smokeless tobacco. She reports previous alcohol use. She reports previous drug use. Family History: family history includes Colon Cancer in her father and sister; Diabetes in her father, paternal aunt, paternal aunt, paternal uncle, paternal uncle, and sister; Heart Disease in her mother; Other in her father and sister. The patients home medications have been reviewed. Allergies: Statins    ---------------------------------------------------PHYSICAL EXAM--------------------------------------    Constitutional/General: Alert and oriented x3, does not appear in any distress  Head: Normocephalic and atraumatic  Eyes: PERRL, EOMI  Mouth: Oropharynx clear, handling secretions, no trismus  Neck: Supple, full ROM, non tender to palpation in the midline, no stridor, no crepitus, no meningeal signs  Pulmonary: Lungs clear to auscultation bilaterally, no wheezes, rales, or rhonchi. Not in respiratory distress  Cardiovascular:  Regular rate. irregular rhythm. No murmurs, gallops, or rubs. 2+ distal pulses  Chest: no chest wall tenderness  Abdomen: Soft. Non tender. Non distended. +BS. No rebound, guarding, or rigidity. No pulsatile masses appreciated. Musculoskeletal: Moves all extremities x 4.  Warm and well perfused, no clubbing, cyanosis, noted edema bilaterally with dressings to lower extremities  Skin: warm and dry. No rashes. Neurologic: GCS 15, CN 2-12 grossly intact, no focal deficits, symmetric strength 5/5 in the upper and lower extremities bilaterally  Psych: Normal Affect not homicidal or suicidal    -------------------------------------------------- RESULTS -------------------------------------------------  I have personally reviewed all laboratory and imaging results for this patient. Results are listed below.      LABS:  Results for orders placed or performed during the hospital encounter of 08/31/21   CBC auto differential   Result Value Ref Range    WBC 13.0 (H) 4.5 - 11.5 E9/L    RBC 3.40 (L) 3.50 - 5.50 E12/L    Hemoglobin 8.1 (L) 11.5 - 15.5 g/dL    Hematocrit 28.0 (L) 34.0 - 48.0 %    MCV 82.4 80.0 - 99.9 fL    MCH 23.8 (L) 26.0 - 35.0 pg    MCHC 28.9 (L) 32.0 - 34.5 %    RDW 18.1 (H) 11.5 - 15.0 fL    Platelets 456 553 - 618 E9/L    MPV 9.1 7.0 - 12.0 fL    Neutrophils % 84.2 (H) 43.0 - 80.0 %    Lymphocytes % 6.1 (L) 20.0 - 42.0 %    Monocytes % 7.9 2.0 - 12.0 %    Eosinophils % 0.9 0.0 - 6.0 %    Basophils % 0.9 0.0 - 2.0 %    Neutrophils Absolute 10.92 (H) 1.80 - 7.30 E9/L    Lymphocytes Absolute 0.78 (L) 1.50 - 4.00 E9/L    Monocytes Absolute 1.04 (H) 0.10 - 0.95 E9/L    Eosinophils Absolute 0.12 0.05 - 0.50 E9/L    Basophils Absolute 0.12 0.00 - 0.20 E9/L    Anisocytosis 1+     Polychromasia 1+     Hypochromia 2+     Poikilocytes 1+     Ovalocytes 1+    Comprehensive Metabolic Panel   Result Value Ref Range    Sodium 139 132 - 146 mmol/L    Potassium 4.6 3.5 - 5.0 mmol/L    Chloride 97 (L) 98 - 107 mmol/L    CO2 41 (HH) 22 - 29 mmol/L    Anion Gap 1 (L) 7 - 16 mmol/L    Glucose 143 (H) 74 - 99 mg/dL    BUN 44 (H) 6 - 23 mg/dL    CREATININE 1.0 0.5 - 1.0 mg/dL    GFR Non-African American 55 >=60 mL/min/1.73    GFR African American >60     Calcium 8.9 8.6 - 10.2 mg/dL    Total Protein 6.9 6.4 - 8.3 g/dL    Albumin 3.0 (L) 3.5 - 5.2 g/dL    Total Bilirubin 0.2 0.0 - 1.2 mg/dL    Alkaline Phosphatase 139 (H) 35 - 104 U/L    ALT 23 0 - 32 U/L    AST 21 0 - 31 U/L   Serum Drug Screen   Result Value Ref Range    Ethanol Lvl <10 mg/dL    Acetaminophen Level <5.0 (L) 10.0 - 38.4 mcg/mL    Salicylate, Serum <9.4 0.0 - 30.0 mg/dL    TCA Scrn NEGATIVE Cutoff:300 ng/mL   Urine Drug Screen   Result Value Ref Range    Amphetamine Screen, Urine NOT DETECTED Negative <1000 ng/mL    Barbiturate Screen, Ur NOT DETECTED Negative < 200 ng/mL    Benzodiazepine Screen, Urine NOT DETECTED Negative < 200 ng/mL    Cannabinoid Scrn, Ur NOT DETECTED Negative < 50ng/mL    Cocaine Metabolite Screen, Urine NOT DETECTED Negative < 300 ng/mL    Opiate Scrn, Ur POSITIVE (A) Negative < 300ng/mL    PCP Screen, Urine NOT DETECTED Negative < 25 ng/mL    Methadone Screen, Urine NOT DETECTED Negative <300 ng/mL    Oxycodone Urine NOT DETECTED Negative <100 ng/mL    FENTANYL SCREEN, URINE NOT DETECTED Negative <1 ng/mL    Drug Screen Comment: see below    Urinalysis   Result Value Ref Range    Color, UA DARK YELLOW (A) Straw/Yellow    Clarity, UA Clear Clear    Glucose, Ur Negative Negative mg/dL    Bilirubin Urine Negative Negative    Ketones, Urine Negative Negative mg/dL    Specific Gravity, UA 1.015 1.005 - 1.030    Blood, Urine Negative Negative    pH, UA 6.0 5.0 - 9.0    Protein, UA Negative Negative mg/dL    Urobilinogen, Urine 0.2 <2.0 E.U./dL    Nitrite, Urine Negative Negative    Leukocyte Esterase, Urine Negative Negative       RADIOLOGY:  Interpreted by Radiologist.  XR PELVIS (1-2 VIEWS)   Final Result   No radiographic evidence of acute pelvic or hip fracture. Evaluation limited by technique.                     ------------------------- NURSING NOTES AND VITALS REVIEWED ---------------------------   The nursing notes within the ED encounter and vital signs as below have been reviewed by myself.   /73   Pulse 82   Temp 97.2 °F (36.2 °C)   Resp 18   Ht 5' 3\" (1.6 m)   Wt (!) 304 lb (137.9 kg)   LMP  (LMP Unknown)   SpO2 98%   BMI 53.85 kg/m²   Oxygen Saturation Interpretation: Normal    The patients available past medical records and past encounters were reviewed. ------------------------------ ED COURSE/MEDICAL DECISION MAKING----------------------  Medications - No data to display          Medical Decision Making:    Presenting here because of evaluation. Patient reportedly had fallen patient per EMS has been more agitated and aggressive with staff. Patient reporting no chest pain no abdominal pain no vomiting. Patient labs noted reviewed. Patient afebrile. Heart rate within normal limits. Plan will be to  to evaluate    Re-Evaluations:             Re-evaluation. Patients symptoms show no change  Patient reevaluated no distress. Patient was given water to drink. Patient reporting no pain in her chest or abdomen. She is awake alert oriented. Consultations:                  Critical Care: This patient's ED course included: a personal history and physicial eaxmination    This patient has been closely monitored during their ED course. Counseling: The emergency provider has spoken with the patient and discussed todays results, in addition to providing specific details for the plan of care and counseling regarding the diagnosis and prognosis. Questions are answered at this time and they are agreeable with the plan.       --------------------------------- IMPRESSION AND DISPOSITION ---------------------------------    IMPRESSION  1. Mood disorder ([facility]        DISPOSITION  Disposition:  to assist with disposition  Patient condition is stable        NOTE: This report was transcribed using voice recognition software.  Every effort was made to ensure accuracy; however, inadvertent computerized transcription errors may be present          Nisha Rivera MD  09/01/21 [address], MD  09/01/21 1062 Alicia Busby MD  09/01/21 [id number]

## 2021-09-01 NOTE — CARE COORDINATION
CM note. [facility] covid test.   Nurse will need to call report to [phone]. [facility] to transport ETA [phone].   Dov Felix RN CM

## 2021-09-01 NOTE — ED NOTES
ALMITA ARMIJO called [facility] and informed Marlys Gomez RN that Pt would be discharged back to their facility as Pt does not meet inpatient criteria for psych. ALMITA ARMIJO called and spoke with Pt son, Herman Manuel and informed him that Pt would be returned to Rehab. Herman Manuel informed that his mom called him from rehab last night crying because no one would help her off the floor. Herman Manuel stated that all of a sudden a staff member must have grabbed the phone from his mom and started yelling in the phone \"that his mom needs to go to go to another facility because night shift staff does not like your mom and then hung up on him. \"    Herman Manuel does not feel that his mom is safe or will get the treatment she needs at this [facility] and would like to see if there are any other options of Rehab for Pt to go to. ALMITA ARMIJO spoke with ED Doc who stopped discharge and is agreeable for prerna Cordova to follow up with this Pt to see if there is anything we can do to help. ALMITA ARMIJO left message for Tasha at [phone].          DHRUV Correa, [facility]  09/01/21 1391

## 2021-09-01 NOTE — ED NOTES
Bed:   Expected date:   Expected time:   Means of arrival:   Comments:  guardian     Maggie Beckwith RN  08/31/21 [id number]

## 2021-09-03 LAB
METANEPH/PLASMA INTERP: NORMAL
METANEPHRINE FREE PLASMA: 0.13 NMOL/L (ref 0–0.49)
NORMETANEPHRINE FREE PLASMA: 0.8 NMOL/L (ref 0–0.89)

## 2021-12-02 ENCOUNTER — OFFICE VISIT (OUTPATIENT)
Dept: ENDOCRINOLOGY | Age: 66
End: 2021-12-02
Payer: MEDICAID

## 2021-12-02 VITALS
SYSTOLIC BLOOD PRESSURE: 114 MMHG | BODY MASS INDEX: 53.85 KG/M2 | HEART RATE: 76 BPM | HEIGHT: 63 IN | DIASTOLIC BLOOD PRESSURE: 73 MMHG

## 2021-12-02 DIAGNOSIS — E55.9 VITAMIN D DEFICIENCY: ICD-10-CM

## 2021-12-02 DIAGNOSIS — E11.65 TYPE 2 DIABETES MELLITUS WITH HYPERGLYCEMIA, WITH LONG-TERM CURRENT USE OF INSULIN (HCC): Primary | ICD-10-CM

## 2021-12-02 DIAGNOSIS — E27.8 ADRENAL INCIDENTALOMA (HCC): ICD-10-CM

## 2021-12-02 DIAGNOSIS — Z79.4 TYPE 2 DIABETES MELLITUS WITH HYPERGLYCEMIA, WITH LONG-TERM CURRENT USE OF INSULIN (HCC): Primary | ICD-10-CM

## 2021-12-02 LAB — HBA1C MFR BLD: 8.6 %

## 2021-12-02 PROCEDURE — 99214 OFFICE O/P EST MOD 30 MIN: CPT | Performed by: INTERNAL MEDICINE

## 2021-12-02 PROCEDURE — 83036 HEMOGLOBIN GLYCOSYLATED A1C: CPT | Performed by: INTERNAL MEDICINE

## 2021-12-02 PROCEDURE — 3052F HG A1C>EQUAL 8.0%<EQUAL 9.0%: CPT | Performed by: INTERNAL MEDICINE

## 2021-12-02 NOTE — PATIENT INSTRUCTIONS
Recommendations for today's visit  · Change Lantus to 48 units twice a day   · Take Humalog 35 units with meals + sliding scale   Blood sugar 150-200: add 3 Units of Humalog  Blood sugar 201-250 : Add 6 Units of Humalog  Blood sugar 251 - 300: Add 9 Units of Humalog  Blood sugar 301 - 350 : Add 12 Units of Humalog  Blood sugar >350 : Add 15 Units of Humalog    · Ar bedtime, please use the following sliding scale   If blood sugars are 200-250 - take 2 units of Humalog   If blood sugars are 251-300 - take 3 units of Humalog   If blood sugars are 301-350 -take 4 units of Humalog   If blood sugars are above 350 - take 5 units of Humalog    · Check Blood sugar 4 times/day before meals and at bedtime and send us sugar log in a week    I you have any questions please call Dr. Daniel Ayala office     Ankit Guan MD  Endocrinologist, [facility])    [address] [facility], [address] [place] [address],[facility] [address]   Phone: [phone]  Fax: [phone]  Email: [email or web address]

## 2021-12-02 NOTE — PROGRESS NOTES
[address] [place] [address] Department of Endocrinology Diabetes and Metabolism   [address] [facility] [address]   Phone: [phone]  Fax: [phone]    Date of service: 12/2/2021  Primary Care Physician: Robbie Ramirez  Consultant physician: César Parada MD     Reason for the consultation:  Uncontrolled DM type 2     History of Present Illness:  Services were provided through a video synchronous discussion     Julio Rodriguez is a 74 y.o. old female with PMH of DM type 2, lymphedema with chronic wounds, COPD,on Ox, HTN, Afib seen today for follow up visit     The patient was diagnosed with type 2 DM at the age 38. She is currently on Lantus 55 U BID, Humalog 35U with meals + ss 3:50>150. She used to be on  U500 insulin at on point. Patient still not consistent carbohydrate meals, self-blood glucose monitoring has been variable. In addition, patient reported h/o neuropathy.    Lab Results   Component Value Date    LABA1C 8.6 12/02/2021     Past medical history:   Past Medical History:   Diagnosis Date    (HFpEF) heart failure with preserved ejection fraction ([facility]     1/16/2018- echo- LVEF 55-65%    Anal fissure     Anemia 10/6/2017    Anxiety and depression     Arthritis     Asthma     Atrial fibrillation (HCC)     Eliquis    Blood transfusion     long time no reaction    CHF (congestive heart failure) (HCC)     Diastolic    COPD (chronic obstructive pulmonary disease) (HCC)     Disc disorder     DM2 (diabetes mellitus, type 2) ([facility]     on insulin    Fall due to stumbling 10/6/2017    GERD (gastroesophageal reflux disease)     History of blood transfusion     Hx of blood clots     Hyperlipidemia     Hypertension     Inappropriate sinus tachycardia     LVH (left ventricular hypertrophy)     moderate    Lymphadenitis, chronic 4/13/2018    Occipital neuralgia 11/2/2011    Respiratory acidosis 10/7/2017    Sinus tachycardia 2/16/2015       Past surgical history:  Past Surgical History:   Procedure Laterality Date    ANOSCOPY  10/25/2006    injection of anal sphincter with Botox, Franklyn Ortiz/Timmy, [facility]    ANOSCOPY  4/9/2007    injection of anal sphincter with Botox, Dr. Jennifer Black, [address] [place] [address] ANOSCOPY  6/13/2007    injection of anal sphincter with Botox, Franklyn Torres [facility]    ANUS SURGERY  4/4/2006    Lateral sphincterotomy for chronic anal fissure, [facility], [facility]  4/18/2012    anal exam and injection of Botox 100 units into anal sphincter, Laila Torres, [facility]    APPENDECTOMY  1965   [address]    COLONOSCOPY  1/20/2004    cecal polyp, bx (no pathologic changes), Dr. Peggye Cabot, [address] [place] [address] COLONOSCOPY  8/11/2006    snare polypectomy terminal ileum polyp (granulation tissue polyp) and anal polyp (inflammatory/papilla), Dr. Jennifer Black, [address] [place] [address] COLONOSCOPY  3/23/2012    bx/cauterization proximal ascending colon polyp, injection of anal sphincter with Botox, Dr. Jennifer Black, [facility]    ENDOSCOPY, COLON, DIAGNOSTIC      FRACTURE SURGERY      R leg fracture with surgery for repair    JOINT REPLACEMENT  2003    L knee    IN DEBRIDEMENT, SKIN, SUB-Q TISSUE,MUSCLE,=<20 SQ CM Left 11/30/2018    LEFT LEG EXCISIONAL  DEBRIDEMENT WITH TISSUE BIOPSY performed by Saleem Mejia DPM at [address]  [facility] ENDOSCOPY  1/20/2004    gastritis, bx (no H pylori), Dr. Peggye Cabot, [address] [facility] ENDOSCOPY N/A 6/1/2018    EGD BIOPSY performed by Theresa Matthews MD at [address] [facility] N/A 11/9/2018    EGD BIOPSY performed by Theresa Matthews MD at [address] [place] [address] history:   Tobacco:   reports that she quit smoking about 16 years ago. Her smoking use included cigarettes. She started smoking about 42 years ago. She has a 37.50 pack-year smoking history.  She has never used smokeless tobacco.  Alcohol: reports previous alcohol use. Drugs:   reports previous drug use. Family history:    Family History   Problem Relation Age of Onset    Heart Disease Mother     Diabetes Father     Colon Cancer Father     Other Father         BLINDNESS    Colon Cancer Sister     Other Sister         shingles- has had over 1 year    Diabetes Paternal Aunt     Diabetes Paternal Uncle     Diabetes Paternal Aunt     Diabetes Paternal Uncle     Diabetes Sister        Allergy and drug reactions:    Allergies   Allergen Reactions    Statins Other (See Comments)     Muscle pains       Scheduled Meds  Current Outpatient Medications on File Prior to Visit   Medication Sig Dispense Refill    bisacodyl (DULCOLAX) 10 MG suppository Place 10 mg rectally daily as needed for Constipation      ferrous sulfate (IRON 325) 325 (65 Fe) MG tablet Take 325 mg by mouth 2 times daily      insulin lispro, 1 Unit Dial, (HUMALOG KWIKPEN) 100 UNIT/ML SOPN Inject 35 Units into the skin 3 times daily *Plus Sliding Scale*      magnesium hydroxide (MILK OF MAGNESIA) 400 MG/5ML suspension Take 30 mLs by mouth daily as needed for Constipation      Multiple Vitamins-Minerals (THERAPEUTIC MULTIVITAMIN-MINERALS) tablet Take 1 tablet by mouth daily      omeprazole (PRILOSEC) 20 MG delayed release capsule Take 20 mg by mouth 2 times daily      acetaminophen (TYLENOL) 325 MG tablet Take 650 mg by mouth every 4 hours as needed for Pain      dilTIAZem (CARDIZEM CD) 240 MG extended release capsule Take 1 capsule by mouth 2 times daily 30 capsule 3    ezetimibe (ZETIA) 10 MG tablet Take 1 tablet by mouth nightly 30 tablet 3    metoprolol succinate (TOPROL XL) 100 MG extended release tablet Take 1 tablet by mouth 2 times daily 30 tablet 3    insulin glargine (LANTUS SOLOSTAR) 100 UNIT/ML injection pen Inject 45 Units into the skin 2 times daily       miconazole (MICOTIN) 2 % powder Apply topically 2 times daily Apply to breasts and abdominal folds  mineral oil-hydrophilic petrolatum (AQUAPHOR) ointment Apply topically 3 times daily as needed for Dry Skin      acetaminophen (TYLENOL) 500 MG tablet Take 1,000 mg by mouth every 6 hours as needed for Pain      escitalopram (LEXAPRO) 20 MG tablet Take 1 tablet by mouth daily 30 tablet 0    apixaban (ELIQUIS) 5 MG TABS tablet Take 5 mg by mouth 2 times daily      bumetanide (BUMEX) 2 MG tablet Take 2 mg by mouth 2 times daily      gabapentin (NEURONTIN) 600 MG tablet Take 600 mg by mouth 3 times daily.  traZODone (DESYREL) 50 MG tablet Take 50 mg by mouth nightly      vitamin D (ERGOCALCIFEROL) 1.25 MG (95944 UT) CAPS capsule Take 50,000 Units by mouth once a week Given Monday      omega-3 acid ethyl esters (LOVAZA) 1 g capsule Take 2 capsules by mouth 2 times daily 60 capsule 3    spironolactone (ALDACTONE) 25 MG tablet Take 1 tablet by mouth daily 30 tablet 5     No current facility-administered medications on file prior to visit. Review of Systems  All systems reviewed. All negative except for symptoms mentioned in HPI     OBJECTIVE    /73   Pulse 76   Ht 5' 3\" (1.6 m)   LMP  (LMP Unknown)   BMI 53.85 kg/m²   No intake or output data in the 24 hours ending 12/02/21 1330    Physical examination:  General: awake alert, oriented x3  HEENT: normocephalic non traumatic, no exophthalmos   Neck: supple, thyroid tenderness,  Pulm: Clear equal air entry no added sounds  CVS: S1 + S2  Abd: soft lax, no tenderness  Skin: warm, no lesions, no rash.  Wound in both legs   Neuro: CN intact, sensation decreased bilateral , muscle power normal  Psych: normal mood, and affect    Review of Laboratory Data:  I personally reviewed the following labs:   Lab Results   Component Value Date/Time    WBC 13.0 (H) 08/31/2021 11:34 PM    RBC 3.40 (L) 08/31/2021 11:34 PM    HGB 8.1 (L) 08/31/2021 11:34 PM    HCT 28.0 (L) 08/31/2021 11:34 PM    MCV 82.4 08/31/2021 11:34 PM    MCH 23.8 (L) 08/31/2021 11:34 PM MCHC 28.9 (L) 08/31/2021 11:34 PM    RDW 18.1 (H) 08/31/2021 11:34 PM     08/31/2021 11:34 PM    MPV 9.1 08/31/2021 11:34 PM      Lab Results   Component Value Date/Time     08/31/2021 11:34 PM    K 4.6 08/31/2021 11:34 PM    K 4.9 08/23/2021 05:06 AM    CO2 41 (HH) 08/31/2021 11:34 PM    BUN 44 (H) 08/31/2021 11:34 PM    CREATININE 1.0 08/31/2021 11:34 PM    CALCIUM 8.9 08/31/2021 11:34 PM    LABGLOM 55 08/31/2021 11:34 PM    GFRAA >60 08/31/2021 11:34 PM      Lab Results   Component Value Date/Time    TSH 1.890 08/18/2021 09:52 AM    T4FREE 1.05 01/15/2021 09:04 AM     Lab Results   Component Value Date    LABA1C 8.6 12/02/2021    GLUCOSE 143 08/31/2021    GLUCOSE 181 12/16/2011    MALBCR <30 05/16/2018    LABMICR <12.0 05/12/2017    LABCREA 136 08/21/2021     Lab Results   Component Value Date    LABA1C 8.6 12/02/2021    LABA1C 7.9 07/06/2021    LABA1C 10.9 04/30/2021     Lab Results   Component Value Date    TRIG 298 01/16/2021    HDL 52 01/16/2021    LDLCALC 243 01/16/2021    CHOL 355 01/16/2021     Lab Results   Component Value Date    VITD25 17 05/26/2020    VITD25 9 11/07/2019     ASSESSMENT & PLAN   Zuly Joya, a 74 y.o.-old female seen today for inpatient diabetes management     Diabetes Mellitus type 2  · Poorly controlled   · Will change insulin regimen to Lantus to 48 units BID, Humalog 35 units with meals + sliding scale 3:50>150   · check blood sugars 4 times a day before meals and at bedtime and fax the results to our office in a week. · Discussed with patient A1c and blood sugar goals   · Patient will need routine diabetes maintenance and prevention  · Diabetes labs before next visit     Obesity   Discussed lifestyle changes including diet and exercise with patient in depth.  Also discussed with patient cardiovascular risk associated with obesity    Rt adrenal mass  · CT adrenal  5/30/2020 --> high pre-contrast Hounsfield units (34) with late phase washout 49 Hounsfield units makings lesion undetermined. This lesion has increased size when comparison is made with previous study of April 2019. Increased size of the right adrenal gland lesion up to 3.4 x 3 cm,  · Abdominal CT scan 8/2021 --> 4.4 cm Rt adrenal lesion with 33 Hounsfield units  · Previous endocrine work up 5/2020, 8/2021  showed normal plasma metanephrines  · Renin was 31.1 in 8/2021 but she was on spironolactone. , will contact PCP to hold Spironolactone for 5 weeks and recheck renin/Patel   · 1 mg DST suppressed to 3.1 in 5/2020 and ACTH wasn't suppressed at that time   · Will repeat 1 mg DST and 24 hr urine free cortisol   · Will also btain CT scan with Adrenal protocol     The above issues were reviewed with the patient who understood and agreed with the plan. Thank you for allowing us to participate in the care of this patient. Please do not hesitate to contact us with any additional questions. Aracely Giles MD  Endocrinologist, [place] [address] [facility] and Endocrinology   [address] [place] [address]   Phone: [phone]  Fax: [phone]  -------------------------  An electronic signature was used to authenticate this note.  Jayjay King MD on 12/2/2021 at 1:30 PM

## 2021-12-04 RX ORDER — DEXAMETHASONE 1 MG
1 TABLET ORAL ONCE
Qty: 1 TABLET | Refills: 0 | Status: SHIPPED | OUTPATIENT
Start: 2022-01-15 | End: 2022-01-15

## 2021-12-30 ENCOUNTER — TELEPHONE (OUTPATIENT)
Dept: ENDOCRINOLOGY | Age: 66
End: 2021-12-30

## 2022-03-08 ENCOUNTER — APPOINTMENT (OUTPATIENT)
Dept: GENERAL RADIOLOGY | Age: 67
DRG: 870 | End: 2022-03-08
Payer: MEDICARE

## 2022-03-08 ENCOUNTER — APPOINTMENT (OUTPATIENT)
Dept: CT IMAGING | Age: 67
DRG: 870 | End: 2022-03-08
Payer: MEDICARE

## 2022-03-08 ENCOUNTER — HOSPITAL ENCOUNTER (INPATIENT)
Age: 67
LOS: 14 days | Discharge: NURSING, REHAB OR LONG-TERM CARE | DRG: 870 | End: 2022-03-22
Attending: EMERGENCY MEDICINE | Admitting: INTERNAL MEDICINE
Payer: MEDICARE

## 2022-03-08 DIAGNOSIS — R65.21 SEPTIC SHOCK (HCC): ICD-10-CM

## 2022-03-08 DIAGNOSIS — L03.115 BILATERAL LOWER LEG CELLULITIS: ICD-10-CM

## 2022-03-08 DIAGNOSIS — J18.9 PNEUMONIA OF RIGHT LUNG DUE TO INFECTIOUS ORGANISM, UNSPECIFIED PART OF LUNG: ICD-10-CM

## 2022-03-08 DIAGNOSIS — J96.01 ACUTE RESPIRATORY FAILURE WITH HYPOXIA AND HYPERCAPNIA (HCC): ICD-10-CM

## 2022-03-08 DIAGNOSIS — A41.9 SEPSIS, DUE TO UNSPECIFIED ORGANISM, UNSPECIFIED WHETHER ACUTE ORGAN DYSFUNCTION PRESENT (HCC): Primary | ICD-10-CM

## 2022-03-08 DIAGNOSIS — J96.02 ACUTE RESPIRATORY FAILURE WITH HYPOXIA AND HYPERCAPNIA (HCC): ICD-10-CM

## 2022-03-08 DIAGNOSIS — R73.9 HYPERGLYCEMIA: ICD-10-CM

## 2022-03-08 DIAGNOSIS — L03.116 BILATERAL LOWER LEG CELLULITIS: ICD-10-CM

## 2022-03-08 DIAGNOSIS — A41.9 SEPTIC SHOCK (HCC): ICD-10-CM

## 2022-03-08 LAB
ADENOVIRUS BY PCR: NOT DETECTED
ALBUMIN SERPL-MCNC: 3.8 G/DL (ref 3.5–5.2)
ALP BLD-CCNC: 103 U/L (ref 35–104)
ALT SERPL-CCNC: 19 U/L (ref 0–32)
ANION GAP SERPL CALCULATED.3IONS-SCNC: 12 MMOL/L (ref 7–16)
ANION GAP SERPL CALCULATED.3IONS-SCNC: 15 MMOL/L (ref 7–16)
AST SERPL-CCNC: 34 U/L (ref 0–31)
B.E.: 1.9 MMOL/L (ref -3–3)
B.E.: 4.3 MMOL/L (ref -3–3)
B.E.: 6.1 MMOL/L (ref -3–3)
BASOPHILS ABSOLUTE: 0.01 E9/L (ref 0–0.2)
BASOPHILS RELATIVE PERCENT: 0.1 % (ref 0–2)
BETA-HYDROXYBUTYRATE: 0.15 MMOL/L (ref 0.02–0.27)
BILIRUB SERPL-MCNC: 0.3 MG/DL (ref 0–1.2)
BORDETELLA PARAPERTUSSIS BY PCR: NOT DETECTED
BORDETELLA PERTUSSIS BY PCR: NOT DETECTED
BUN BLDV-MCNC: 31 MG/DL (ref 6–23)
BUN BLDV-MCNC: 36 MG/DL (ref 6–23)
CALCIUM SERPL-MCNC: 8.5 MG/DL (ref 8.6–10.2)
CALCIUM SERPL-MCNC: 9.2 MG/DL (ref 8.6–10.2)
CHLAMYDOPHILIA PNEUMONIAE BY PCR: NOT DETECTED
CHLORIDE BLD-SCNC: 96 MMOL/L (ref 98–107)
CHLORIDE BLD-SCNC: 99 MMOL/L (ref 98–107)
CO2: 25 MMOL/L (ref 22–29)
CO2: 31 MMOL/L (ref 22–29)
COHB: 0.6 % (ref 0–1.5)
COHB: 1.1 % (ref 0–1.5)
COHB: 1.1 % (ref 0–1.5)
COMMENT: ABNORMAL
CORONAVIRUS 229E BY PCR: NOT DETECTED
CORONAVIRUS HKU1 BY PCR: NOT DETECTED
CORONAVIRUS NL63 BY PCR: NOT DETECTED
CORONAVIRUS OC43 BY PCR: NOT DETECTED
CORTISOL TOTAL: 165.3 MCG/DL (ref 2.68–18.4)
CREAT SERPL-MCNC: 0.9 MG/DL (ref 0.5–1)
CREAT SERPL-MCNC: 1.2 MG/DL (ref 0.5–1)
CRITICAL: ABNORMAL
DATE ANALYZED: ABNORMAL
DATE OF COLLECTION: ABNORMAL
EOSINOPHILS ABSOLUTE: 0.04 E9/L (ref 0.05–0.5)
EOSINOPHILS RELATIVE PERCENT: 0.6 % (ref 0–6)
FIO2: 100 %
FIO2: 100 %
GFR AFRICAN AMERICAN: 54
GFR AFRICAN AMERICAN: >60
GFR NON-AFRICAN AMERICAN: 45 ML/MIN/1.73
GFR NON-AFRICAN AMERICAN: >60 ML/MIN/1.73
GLUCOSE BLD-MCNC: 288 MG/DL (ref 74–99)
GLUCOSE BLD-MCNC: 358 MG/DL (ref 74–99)
HCO3: 25.4 MMOL/L (ref 22–26)
HCO3: 32.3 MMOL/L (ref 22–26)
HCO3: 37.2 MMOL/L (ref 22–26)
HCT VFR BLD CALC: 34.5 % (ref 34–48)
HEMOGLOBIN: 10 G/DL (ref 11.5–15.5)
HHB: 0.4 % (ref 0–5)
HHB: 14.4 % (ref 0–5)
HHB: 23.5 % (ref 0–5)
HUMAN METAPNEUMOVIRUS BY PCR: NOT DETECTED
HUMAN RHINOVIRUS/ENTEROVIRUS BY PCR: NOT DETECTED
IMMATURE GRANULOCYTES #: 0.03 E9/L
IMMATURE GRANULOCYTES %: 0.4 % (ref 0–5)
INFLUENZA A BY PCR: NOT DETECTED
INFLUENZA B BY PCR: NOT DETECTED
LAB: ABNORMAL
LACTIC ACID, SEPSIS: 3.4 MMOL/L (ref 0.5–1.9)
LACTIC ACID, SEPSIS: 6.5 MMOL/L (ref 0.5–1.9)
LACTIC ACID: 6 MMOL/L (ref 0.5–2.2)
LACTIC ACID: 7.1 MMOL/L (ref 0.5–2.2)
LIPASE: 12 U/L (ref 13–60)
LYMPHOCYTES ABSOLUTE: 0.44 E9/L (ref 1.5–4)
LYMPHOCYTES RELATIVE PERCENT: 6.1 % (ref 20–42)
Lab: ABNORMAL
MAGNESIUM: 1.4 MG/DL (ref 1.6–2.6)
MAGNESIUM: 1.9 MG/DL (ref 1.6–2.6)
MAGNESIUM: 2 MG/DL (ref 1.6–2.6)
MCH RBC QN AUTO: 25.2 PG (ref 26–35)
MCHC RBC AUTO-ENTMCNC: 29 % (ref 32–34.5)
MCV RBC AUTO: 86.9 FL (ref 80–99.9)
METER GLUCOSE: 159 MG/DL (ref 74–99)
METER GLUCOSE: 186 MG/DL (ref 74–99)
METER GLUCOSE: 236 MG/DL (ref 74–99)
METER GLUCOSE: 269 MG/DL (ref 74–99)
METER GLUCOSE: 271 MG/DL (ref 74–99)
METER GLUCOSE: 284 MG/DL (ref 74–99)
METER GLUCOSE: 320 MG/DL (ref 74–99)
METER GLUCOSE: 323 MG/DL (ref 74–99)
METHB: 0.3 % (ref 0–1.5)
MODE: AC
MODE: AC
MONOCYTES ABSOLUTE: 0.12 E9/L (ref 0.1–0.95)
MONOCYTES RELATIVE PERCENT: 1.7 % (ref 2–12)
MYCOPLASMA PNEUMONIAE BY PCR: NOT DETECTED
NEUTROPHILS ABSOLUTE: 6.61 E9/L (ref 1.8–7.3)
NEUTROPHILS RELATIVE PERCENT: 91.1 % (ref 43–80)
O2 CONTENT: 10.7 ML/DL
O2 CONTENT: 11 ML/DL
O2 CONTENT: 14.5 ML/DL
O2 SATURATION: 76.2 % (ref 92–98.5)
O2 SATURATION: 85.4 % (ref 92–98.5)
O2 SATURATION: 99.6 % (ref 92–98.5)
O2HB: 75.1 % (ref 94–97)
O2HB: 84.2 % (ref 94–97)
O2HB: 98.7 % (ref 94–97)
OPERATOR ID: 913
OPERATOR ID: ABNORMAL
OPERATOR ID: ABNORMAL
PARAINFLUENZA VIRUS 1 BY PCR: NOT DETECTED
PARAINFLUENZA VIRUS 2 BY PCR: NOT DETECTED
PARAINFLUENZA VIRUS 3 BY PCR: NOT DETECTED
PARAINFLUENZA VIRUS 4 BY PCR: NOT DETECTED
PATIENT TEMP: 37 C
PCO2: 105.4 MMHG (ref 35–45)
PCO2: 35.1 MMHG (ref 35–45)
PCO2: 70.6 MMHG (ref 35–45)
PDW BLD-RTO: 14.6 FL (ref 11.5–15)
PEEP/CPAP: 6 CMH2O
PEEP/CPAP: 8 CMH2O
PFO2: 0.53 MMHG/%
PFO2: 2.81 MMHG/%
PH BLOOD GAS: 7.17 (ref 7.35–7.45)
PH BLOOD GAS: 7.28 (ref 7.35–7.45)
PH BLOOD GAS: 7.48 (ref 7.35–7.45)
PH VENOUS: 7.32 (ref 7.35–7.45)
PHOSPHORUS: 2.3 MG/DL (ref 2.5–4.5)
PLATELET # BLD: 172 E9/L (ref 130–450)
PMV BLD AUTO: 9.5 FL (ref 7–12)
PO2: 280.9 MMHG (ref 75–100)
PO2: 47.5 MMHG (ref 75–100)
PO2: 52.8 MMHG (ref 75–100)
POTASSIUM REFLEX MAGNESIUM: 5.7 MMOL/L (ref 3.5–5)
POTASSIUM SERPL-SCNC: 3.8 MMOL/L (ref 3.5–5)
POTASSIUM SERPL-SCNC: 4 MMOL/L (ref 3.5–5)
PRO-BNP: 106 PG/ML (ref 0–125)
RBC # BLD: 3.97 E12/L (ref 3.5–5.5)
RBC # BLD: NORMAL 10*6/UL
REASON FOR REJECTION: NORMAL
REJECTED TEST: NORMAL
RESPIRATORY SYNCYTIAL VIRUS BY PCR: NOT DETECTED
RR MECHANICAL: 26 B/MIN
RR MECHANICAL: 26 B/MIN
SARS-COV-2, NAAT: NOT DETECTED
SARS-COV-2, PCR: NOT DETECTED
SEDIMENTATION RATE, ERYTHROCYTE: 30 MM/HR (ref 0–20)
SODIUM BLD-SCNC: 139 MMOL/L (ref 132–146)
SODIUM BLD-SCNC: 139 MMOL/L (ref 132–146)
SOURCE, BLOOD GAS: ABNORMAL
THB: 10.4 G/DL (ref 11.5–16.5)
THB: 9 G/DL (ref 11.5–16.5)
THB: 9.9 G/DL (ref 11.5–16.5)
TIME ANALYZED: 1047
TIME ANALYZED: 1601
TIME ANALYZED: 820
TOTAL PROTEIN: 7.2 G/DL (ref 6.4–8.3)
TROPONIN, HIGH SENSITIVITY: 37 NG/L (ref 0–9)
TROPONIN, HIGH SENSITIVITY: 48 NG/L (ref 0–9)
VT MECHANICAL: 420 ML
VT MECHANICAL: 420 ML
WBC # BLD: 7.3 E9/L (ref 4.5–11.5)

## 2022-03-08 PROCEDURE — 87081 CULTURE SCREEN ONLY: CPT

## 2022-03-08 PROCEDURE — 87040 BLOOD CULTURE FOR BACTERIA: CPT

## 2022-03-08 PROCEDURE — 73590 X-RAY EXAM OF LOWER LEG: CPT

## 2022-03-08 PROCEDURE — 31500 INSERT EMERGENCY AIRWAY: CPT

## 2022-03-08 PROCEDURE — 93005 ELECTROCARDIOGRAM TRACING: CPT | Performed by: INTERNAL MEDICINE

## 2022-03-08 PROCEDURE — 87147 CULTURE TYPE IMMUNOLOGIC: CPT

## 2022-03-08 PROCEDURE — 85025 COMPLETE CBC W/AUTO DIFF WBC: CPT

## 2022-03-08 PROCEDURE — 6360000002 HC RX W HCPCS: Performed by: STUDENT IN AN ORGANIZED HEALTH CARE EDUCATION/TRAINING PROGRAM

## 2022-03-08 PROCEDURE — 71045 X-RAY EXAM CHEST 1 VIEW: CPT

## 2022-03-08 PROCEDURE — 84100 ASSAY OF PHOSPHORUS: CPT

## 2022-03-08 PROCEDURE — 80053 COMPREHEN METABOLIC PANEL: CPT

## 2022-03-08 PROCEDURE — 82962 GLUCOSE BLOOD TEST: CPT

## 2022-03-08 PROCEDURE — 87070 CULTURE OTHR SPECIMN AEROBIC: CPT

## 2022-03-08 PROCEDURE — 83605 ASSAY OF LACTIC ACID: CPT

## 2022-03-08 PROCEDURE — 2000000000 HC ICU R&B

## 2022-03-08 PROCEDURE — 6370000000 HC RX 637 (ALT 250 FOR IP): Performed by: STUDENT IN AN ORGANIZED HEALTH CARE EDUCATION/TRAINING PROGRAM

## 2022-03-08 PROCEDURE — 36556 INSERT NON-TUNNEL CV CATH: CPT

## 2022-03-08 PROCEDURE — 6360000002 HC RX W HCPCS: Performed by: SPECIALIST

## 2022-03-08 PROCEDURE — 6370000000 HC RX 637 (ALT 250 FOR IP): Performed by: INTERNAL MEDICINE

## 2022-03-08 PROCEDURE — 87150 DNA/RNA AMPLIFIED PROBE: CPT

## 2022-03-08 PROCEDURE — 2580000003 HC RX 258: Performed by: STUDENT IN AN ORGANIZED HEALTH CARE EDUCATION/TRAINING PROGRAM

## 2022-03-08 PROCEDURE — 73701 CT LOWER EXTREMITY W/DYE: CPT

## 2022-03-08 PROCEDURE — 82010 KETONE BODYS QUAN: CPT

## 2022-03-08 PROCEDURE — 36620 INSERTION CATHETER ARTERY: CPT

## 2022-03-08 PROCEDURE — 0202U NFCT DS 22 TRGT SARS-COV-2: CPT

## 2022-03-08 PROCEDURE — 71275 CT ANGIOGRAPHY CHEST: CPT

## 2022-03-08 PROCEDURE — 96367 TX/PROPH/DG ADDL SEQ IV INF: CPT

## 2022-03-08 PROCEDURE — 6360000004 HC RX CONTRAST MEDICATION: Performed by: RADIOLOGY

## 2022-03-08 PROCEDURE — 2500000003 HC RX 250 WO HCPCS: Performed by: STUDENT IN AN ORGANIZED HEALTH CARE EDUCATION/TRAINING PROGRAM

## 2022-03-08 PROCEDURE — 82805 BLOOD GASES W/O2 SATURATION: CPT

## 2022-03-08 PROCEDURE — 2580000003 HC RX 258: Performed by: SPECIALIST

## 2022-03-08 PROCEDURE — C9113 INJ PANTOPRAZOLE SODIUM, VIA: HCPCS | Performed by: INTERNAL MEDICINE

## 2022-03-08 PROCEDURE — 96375 TX/PRO/DX INJ NEW DRUG ADDON: CPT

## 2022-03-08 PROCEDURE — 74018 RADEX ABDOMEN 1 VIEW: CPT

## 2022-03-08 PROCEDURE — 86060 ANTISTREPTOLYSIN O TITER: CPT

## 2022-03-08 PROCEDURE — 83880 ASSAY OF NATRIURETIC PEPTIDE: CPT

## 2022-03-08 PROCEDURE — 5A1955Z RESPIRATORY VENTILATION, GREATER THAN 96 CONSECUTIVE HOURS: ICD-10-PCS | Performed by: INTERNAL MEDICINE

## 2022-03-08 PROCEDURE — 94002 VENT MGMT INPAT INIT DAY: CPT

## 2022-03-08 PROCEDURE — 87088 URINE BACTERIA CULTURE: CPT

## 2022-03-08 PROCEDURE — 96365 THER/PROPH/DIAG IV INF INIT: CPT

## 2022-03-08 PROCEDURE — 85651 RBC SED RATE NONAUTOMATED: CPT

## 2022-03-08 PROCEDURE — 93005 ELECTROCARDIOGRAM TRACING: CPT | Performed by: STUDENT IN AN ORGANIZED HEALTH CARE EDUCATION/TRAINING PROGRAM

## 2022-03-08 PROCEDURE — 83735 ASSAY OF MAGNESIUM: CPT

## 2022-03-08 PROCEDURE — 2500000003 HC RX 250 WO HCPCS: Performed by: EMERGENCY MEDICINE

## 2022-03-08 PROCEDURE — 87075 CULTR BACTERIA EXCEPT BLOOD: CPT

## 2022-03-08 PROCEDURE — 2580000003 HC RX 258: Performed by: INTERNAL MEDICINE

## 2022-03-08 PROCEDURE — 37799 UNLISTED PX VASCULAR SURGERY: CPT

## 2022-03-08 PROCEDURE — 99285 EMERGENCY DEPT VISIT HI MDM: CPT

## 2022-03-08 PROCEDURE — 84132 ASSAY OF SERUM POTASSIUM: CPT

## 2022-03-08 PROCEDURE — 84484 ASSAY OF TROPONIN QUANT: CPT

## 2022-03-08 PROCEDURE — 82800 BLOOD PH: CPT

## 2022-03-08 PROCEDURE — 82533 TOTAL CORTISOL: CPT

## 2022-03-08 PROCEDURE — 80048 BASIC METABOLIC PNL TOTAL CA: CPT

## 2022-03-08 PROCEDURE — 6360000002 HC RX W HCPCS

## 2022-03-08 PROCEDURE — 86140 C-REACTIVE PROTEIN: CPT

## 2022-03-08 PROCEDURE — 83690 ASSAY OF LIPASE: CPT

## 2022-03-08 PROCEDURE — 36415 COLL VENOUS BLD VENIPUNCTURE: CPT

## 2022-03-08 PROCEDURE — 03HY32Z INSERTION OF MONITORING DEVICE INTO UPPER ARTERY, PERCUTANEOUS APPROACH: ICD-10-PCS | Performed by: INTERNAL MEDICINE

## 2022-03-08 PROCEDURE — C1751 CATH, INF, PER/CENT/MIDLINE: HCPCS

## 2022-03-08 PROCEDURE — 74177 CT ABD & PELVIS W/CONTRAST: CPT

## 2022-03-08 PROCEDURE — 0BH18EZ INSERTION OF ENDOTRACHEAL AIRWAY INTO TRACHEA, VIA NATURAL OR ARTIFICIAL OPENING ENDOSCOPIC: ICD-10-PCS | Performed by: STUDENT IN AN ORGANIZED HEALTH CARE EDUCATION/TRAINING PROGRAM

## 2022-03-08 PROCEDURE — 87077 CULTURE AEROBIC IDENTIFY: CPT

## 2022-03-08 PROCEDURE — 2500000003 HC RX 250 WO HCPCS: Performed by: INTERNAL MEDICINE

## 2022-03-08 PROCEDURE — 6360000002 HC RX W HCPCS: Performed by: INTERNAL MEDICINE

## 2022-03-08 PROCEDURE — 87635 SARS-COV-2 COVID-19 AMP PRB: CPT

## 2022-03-08 PROCEDURE — 87186 SC STD MICRODIL/AGAR DIL: CPT

## 2022-03-08 PROCEDURE — 2580000003 HC RX 258

## 2022-03-08 RX ORDER — MIDAZOLAM HYDROCHLORIDE 2 MG/2ML
2 INJECTION, SOLUTION INTRAMUSCULAR; INTRAVENOUS ONCE
Status: COMPLETED | OUTPATIENT
Start: 2022-03-08 | End: 2022-03-08

## 2022-03-08 RX ORDER — SODIUM CHLORIDE, SODIUM LACTATE, POTASSIUM CHLORIDE, AND CALCIUM CHLORIDE .6; .31; .03; .02 G/100ML; G/100ML; G/100ML; G/100ML
2000 INJECTION, SOLUTION INTRAVENOUS ONCE
Status: COMPLETED | OUTPATIENT
Start: 2022-03-08 | End: 2022-03-08

## 2022-03-08 RX ORDER — FENTANYL CITRATE 50 UG/ML
50 INJECTION, SOLUTION INTRAMUSCULAR; INTRAVENOUS ONCE
Status: COMPLETED | OUTPATIENT
Start: 2022-03-08 | End: 2022-03-08

## 2022-03-08 RX ORDER — DEXTROSE MONOHYDRATE 25 G/50ML
12.5 INJECTION, SOLUTION INTRAVENOUS PRN
Status: DISCONTINUED | OUTPATIENT
Start: 2022-03-08 | End: 2022-03-08 | Stop reason: RX

## 2022-03-08 RX ORDER — DULOXETIN HYDROCHLORIDE 60 MG/1
60 CAPSULE, DELAYED RELEASE ORAL NIGHTLY
COMMUNITY

## 2022-03-08 RX ORDER — MIDAZOLAM HYDROCHLORIDE 2 MG/2ML
2 INJECTION, SOLUTION INTRAMUSCULAR; INTRAVENOUS
Status: DISCONTINUED | OUTPATIENT
Start: 2022-03-08 | End: 2022-03-22 | Stop reason: HOSPADM

## 2022-03-08 RX ORDER — CHLORHEXIDINE GLUCONATE 0.12 MG/ML
15 RINSE ORAL 2 TIMES DAILY
Status: DISCONTINUED | OUTPATIENT
Start: 2022-03-08 | End: 2022-03-20

## 2022-03-08 RX ORDER — ACETAMINOPHEN 650 MG/1
650 SUPPOSITORY RECTAL EVERY 4 HOURS PRN
Status: DISCONTINUED | OUTPATIENT
Start: 2022-03-08 | End: 2022-03-22 | Stop reason: HOSPADM

## 2022-03-08 RX ORDER — CLINDAMYCIN PHOSPHATE 900 MG/50ML
900 INJECTION INTRAVENOUS ONCE
Status: COMPLETED | OUTPATIENT
Start: 2022-03-08 | End: 2022-03-08

## 2022-03-08 RX ORDER — SODIUM CHLORIDE 9 MG/ML
INJECTION, SOLUTION INTRAVENOUS CONTINUOUS
Status: DISCONTINUED | OUTPATIENT
Start: 2022-03-08 | End: 2022-03-22 | Stop reason: HOSPADM

## 2022-03-08 RX ORDER — DULOXETIN HYDROCHLORIDE 30 MG/1
60 CAPSULE, DELAYED RELEASE ORAL EVERY MORNING
Status: ON HOLD | COMMUNITY
End: 2022-10-06 | Stop reason: HOSPADM

## 2022-03-08 RX ORDER — HYDROXYZINE 50 MG/1
50 TABLET, FILM COATED ORAL 3 TIMES DAILY
COMMUNITY

## 2022-03-08 RX ORDER — ROCURONIUM BROMIDE 10 MG/ML
100 INJECTION, SOLUTION INTRAVENOUS ONCE
Status: COMPLETED | OUTPATIENT
Start: 2022-03-08 | End: 2022-03-08

## 2022-03-08 RX ORDER — ETOMIDATE 2 MG/ML
20 INJECTION INTRAVENOUS ONCE
Status: COMPLETED | OUTPATIENT
Start: 2022-03-08 | End: 2022-03-08

## 2022-03-08 RX ORDER — DOCUSATE SODIUM 100 MG/1
100 CAPSULE, LIQUID FILLED ORAL NIGHTLY
Status: ON HOLD | COMMUNITY
End: 2022-03-22 | Stop reason: HOSPADM

## 2022-03-08 RX ORDER — MAGNESIUM SULFATE IN WATER 40 MG/ML
4000 INJECTION, SOLUTION INTRAVENOUS ONCE
Status: COMPLETED | OUTPATIENT
Start: 2022-03-08 | End: 2022-03-08

## 2022-03-08 RX ORDER — INSULIN GLARGINE 100 [IU]/ML
49 INJECTION, SOLUTION SUBCUTANEOUS EVERY MORNING
Status: ON HOLD | COMMUNITY
End: 2022-03-22 | Stop reason: SDUPTHER

## 2022-03-08 RX ORDER — DEXTROSE MONOHYDRATE 50 MG/ML
100 INJECTION, SOLUTION INTRAVENOUS PRN
Status: DISCONTINUED | OUTPATIENT
Start: 2022-03-08 | End: 2022-03-08 | Stop reason: SDUPTHER

## 2022-03-08 RX ORDER — NICOTINE POLACRILEX 4 MG
15 LOZENGE BUCCAL PRN
Status: DISCONTINUED | OUTPATIENT
Start: 2022-03-08 | End: 2022-03-22 | Stop reason: HOSPADM

## 2022-03-08 RX ORDER — DEXTROSE MONOHYDRATE 25 G/50ML
12.5 INJECTION, SOLUTION INTRAVENOUS PRN
Status: DISCONTINUED | OUTPATIENT
Start: 2022-03-08 | End: 2022-03-08 | Stop reason: CLARIF

## 2022-03-08 RX ORDER — DILTIAZEM HYDROCHLORIDE 240 MG/1
240 CAPSULE, EXTENDED RELEASE ORAL 2 TIMES DAILY
Status: ON HOLD | COMMUNITY
End: 2022-03-22 | Stop reason: HOSPADM

## 2022-03-08 RX ORDER — PROMETHAZINE HYDROCHLORIDE 25 MG/ML
25 INJECTION, SOLUTION INTRAMUSCULAR; INTRAVENOUS ONCE
Status: ON HOLD | COMMUNITY
End: 2022-10-04

## 2022-03-08 RX ORDER — ACETAMINOPHEN 325 MG/1
650 TABLET ORAL EVERY 4 HOURS PRN
Status: DISCONTINUED | OUTPATIENT
Start: 2022-03-08 | End: 2022-03-22 | Stop reason: HOSPADM

## 2022-03-08 RX ORDER — PANTOPRAZOLE SODIUM 40 MG/10ML
40 INJECTION, POWDER, LYOPHILIZED, FOR SOLUTION INTRAVENOUS DAILY
Status: DISCONTINUED | OUTPATIENT
Start: 2022-03-08 | End: 2022-03-21

## 2022-03-08 RX ORDER — PROPOFOL 10 MG/ML
INJECTION, EMULSION INTRAVENOUS
Status: DISCONTINUED
Start: 2022-03-08 | End: 2022-03-08 | Stop reason: WASHOUT

## 2022-03-08 RX ORDER — SODIUM CHLORIDE 0.9 % (FLUSH) 0.9 %
SYRINGE (ML) INJECTION
Status: DISPENSED
Start: 2022-03-08 | End: 2022-03-09

## 2022-03-08 RX ORDER — NICOTINE POLACRILEX 4 MG
15 LOZENGE BUCCAL PRN
Status: DISCONTINUED | OUTPATIENT
Start: 2022-03-08 | End: 2022-03-08 | Stop reason: SDUPTHER

## 2022-03-08 RX ORDER — PHENOL 1.4 %
10 AEROSOL, SPRAY (ML) MUCOUS MEMBRANE NIGHTLY
Status: ON HOLD | COMMUNITY
End: 2022-03-22 | Stop reason: HOSPADM

## 2022-03-08 RX ORDER — ACETAMINOPHEN 500 MG
1000 TABLET ORAL ONCE
Status: COMPLETED | OUTPATIENT
Start: 2022-03-08 | End: 2022-03-08

## 2022-03-08 RX ORDER — SODIUM CHLORIDE, SODIUM LACTATE, POTASSIUM CHLORIDE, AND CALCIUM CHLORIDE .6; .31; .03; .02 G/100ML; G/100ML; G/100ML; G/100ML
1000 INJECTION, SOLUTION INTRAVENOUS ONCE
Status: COMPLETED | OUTPATIENT
Start: 2022-03-08 | End: 2022-03-08

## 2022-03-08 RX ORDER — MIDAZOLAM HYDROCHLORIDE 1 MG/ML
INJECTION INTRAMUSCULAR; INTRAVENOUS
Status: COMPLETED
Start: 2022-03-08 | End: 2022-03-08

## 2022-03-08 RX ORDER — 0.9 % SODIUM CHLORIDE 0.9 %
1000 INTRAVENOUS SOLUTION INTRAVENOUS ONCE
Status: COMPLETED | OUTPATIENT
Start: 2022-03-08 | End: 2022-03-08

## 2022-03-08 RX ORDER — DEXTROSE MONOHYDRATE 50 MG/ML
100 INJECTION, SOLUTION INTRAVENOUS PRN
Status: DISCONTINUED | OUTPATIENT
Start: 2022-03-08 | End: 2022-03-22 | Stop reason: HOSPADM

## 2022-03-08 RX ORDER — PROPOFOL 10 MG/ML
5-50 INJECTION, EMULSION INTRAVENOUS
Status: DISCONTINUED | OUTPATIENT
Start: 2022-03-08 | End: 2022-03-08

## 2022-03-08 RX ORDER — SODIUM CHLORIDE 0.9 % (FLUSH) 0.9 %
SYRINGE (ML) INJECTION
Status: COMPLETED
Start: 2022-03-08 | End: 2022-03-08

## 2022-03-08 RX ADMIN — SODIUM CHLORIDE 1000 ML: 9 INJECTION, SOLUTION INTRAVENOUS at 05:30

## 2022-03-08 RX ADMIN — Medication 125 MCG/HR: at 20:44

## 2022-03-08 RX ADMIN — SODIUM CHLORIDE, POTASSIUM CHLORIDE, SODIUM LACTATE AND CALCIUM CHLORIDE 2000 ML: 600; 310; 30; 20 INJECTION, SOLUTION INTRAVENOUS at 07:15

## 2022-03-08 RX ADMIN — MIDAZOLAM HYDROCHLORIDE 2 MG: 1 INJECTION, SOLUTION INTRAMUSCULAR; INTRAVENOUS at 16:35

## 2022-03-08 RX ADMIN — ETOMIDATE 20 MG: 2 INJECTION, SOLUTION INTRAVENOUS at 08:02

## 2022-03-08 RX ADMIN — IOPAMIDOL 75 ML: 755 INJECTION, SOLUTION INTRAVENOUS at 08:40

## 2022-03-08 RX ADMIN — NOREPINEPHRINE BITARTRATE 9 MCG/MIN: 1 INJECTION, SOLUTION, CONCENTRATE INTRAVENOUS at 11:34

## 2022-03-08 RX ADMIN — MICONAZOLE NITRATE: 20 POWDER TOPICAL at 23:58

## 2022-03-08 RX ADMIN — MAGNESIUM SULFATE HEPTAHYDRATE 4000 MG: 40 INJECTION, SOLUTION INTRAVENOUS at 09:34

## 2022-03-08 RX ADMIN — Medication 2 MG/HR: at 17:41

## 2022-03-08 RX ADMIN — NOREPINEPHRINE BITARTRATE 11 MCG/MIN: 1 INJECTION, SOLUTION, CONCENTRATE INTRAVENOUS at 11:41

## 2022-03-08 RX ADMIN — MEROPENEM 1000 MG: 1 INJECTION, POWDER, FOR SOLUTION INTRAVENOUS at 17:00

## 2022-03-08 RX ADMIN — DEXTROSE MONOHYDRATE 150 MG: 50 INJECTION, SOLUTION INTRAVENOUS at 14:59

## 2022-03-08 RX ADMIN — IOPAMIDOL 90 ML: 755 INJECTION, SOLUTION INTRAVENOUS at 07:29

## 2022-03-08 RX ADMIN — CHLORHEXIDINE GLUCONATE 0.12% ORAL RINSE 15 ML: 1.2 LIQUID ORAL at 21:14

## 2022-03-08 RX ADMIN — SODIUM CHLORIDE, PRESERVATIVE FREE: 5 INJECTION INTRAVENOUS at 19:34

## 2022-03-08 RX ADMIN — MEROPENEM 1000 MG: 1 INJECTION, POWDER, FOR SOLUTION INTRAVENOUS at 23:20

## 2022-03-08 RX ADMIN — MIDAZOLAM HYDROCHLORIDE 2 MG: 1 INJECTION, SOLUTION INTRAMUSCULAR; INTRAVENOUS at 14:47

## 2022-03-08 RX ADMIN — NOREPINEPHRINE BITARTRATE 17 MCG/MIN: 1 INJECTION, SOLUTION, CONCENTRATE INTRAVENOUS at 11:59

## 2022-03-08 RX ADMIN — NOREPINEPHRINE BITARTRATE 19 MCG/MIN: 1 INJECTION, SOLUTION, CONCENTRATE INTRAVENOUS at 12:10

## 2022-03-08 RX ADMIN — SODIUM CHLORIDE: 9 INJECTION, SOLUTION INTRAVENOUS at 23:19

## 2022-03-08 RX ADMIN — VANCOMYCIN HYDROCHLORIDE 2500 MG: 5 INJECTION, POWDER, LYOPHILIZED, FOR SOLUTION INTRAVENOUS at 11:48

## 2022-03-08 RX ADMIN — DEXTROSE 0.5 MG/MIN: 5 SOLUTION INTRAVENOUS at 23:22

## 2022-03-08 RX ADMIN — AMIODARONE HYDROCHLORIDE 1 MG/MIN: 50 INJECTION, SOLUTION INTRAVENOUS at 15:25

## 2022-03-08 RX ADMIN — NOREPINEPHRINE BITARTRATE 7 MCG/MIN: 1 INJECTION, SOLUTION, CONCENTRATE INTRAVENOUS at 11:23

## 2022-03-08 RX ADMIN — ACETAMINOPHEN 1000 MG: 500 TABLET ORAL at 12:58

## 2022-03-08 RX ADMIN — SODIUM CHLORIDE 7.89 UNITS/HR: 9 INJECTION, SOLUTION INTRAVENOUS at 16:49

## 2022-03-08 RX ADMIN — PHENYLEPHRINE HYDROCHLORIDE 30 MCG/MIN: 100 INJECTION INTRAVENOUS at 16:37

## 2022-03-08 RX ADMIN — VASOPRESSIN 0.04 UNITS/MIN: 20 INJECTION PARENTERAL at 13:08

## 2022-03-08 RX ADMIN — Medication 50 MCG/HR: at 09:21

## 2022-03-08 RX ADMIN — NOREPINEPHRINE BITARTRATE 21 MCG/MIN: 1 INJECTION, SOLUTION, CONCENTRATE INTRAVENOUS at 12:31

## 2022-03-08 RX ADMIN — MIDAZOLAM HYDROCHLORIDE 2 MG: 2 INJECTION, SOLUTION INTRAMUSCULAR; INTRAVENOUS at 14:47

## 2022-03-08 RX ADMIN — VASOPRESSIN 0.05 UNITS/MIN: 20 INJECTION PARENTERAL at 13:25

## 2022-03-08 RX ADMIN — HYDROCORTISONE SODIUM SUCCINATE 100 MG: 100 INJECTION, POWDER, FOR SOLUTION INTRAMUSCULAR; INTRAVENOUS at 16:54

## 2022-03-08 RX ADMIN — PANTOPRAZOLE SODIUM 40 MG: 40 INJECTION, POWDER, FOR SOLUTION INTRAVENOUS at 16:54

## 2022-03-08 RX ADMIN — CLINDAMYCIN PHOSPHATE 900 MG: 900 INJECTION, SOLUTION INTRAVENOUS at 09:41

## 2022-03-08 RX ADMIN — NOREPINEPHRINE BITARTRATE 13 MCG/MIN: 1 INJECTION, SOLUTION, CONCENTRATE INTRAVENOUS at 11:49

## 2022-03-08 RX ADMIN — CEFEPIME HYDROCHLORIDE 2000 MG: 2 INJECTION, POWDER, FOR SOLUTION INTRAVENOUS at 11:12

## 2022-03-08 RX ADMIN — NOREPINEPHRINE BITARTRATE 15 MCG/MIN: 1 INJECTION, SOLUTION, CONCENTRATE INTRAVENOUS at 11:54

## 2022-03-08 RX ADMIN — SODIUM CHLORIDE, POTASSIUM CHLORIDE, SODIUM LACTATE AND CALCIUM CHLORIDE 1000 ML: 600; 310; 30; 20 INJECTION, SOLUTION INTRAVENOUS at 14:20

## 2022-03-08 RX ADMIN — Medication 75 MCG/HR: at 09:54

## 2022-03-08 RX ADMIN — Medication 100 MCG/HR: at 13:07

## 2022-03-08 RX ADMIN — ACETAMINOPHEN 650 MG: 325 TABLET ORAL at 23:27

## 2022-03-08 RX ADMIN — NOREPINEPHRINE BITARTRATE 5 MCG/MIN: 1 INJECTION, SOLUTION, CONCENTRATE INTRAVENOUS at 10:48

## 2022-03-08 RX ADMIN — ACETAMINOPHEN 1000 MG: 500 TABLET ORAL at 05:42

## 2022-03-08 RX ADMIN — HYDROCORTISONE SODIUM SUCCINATE 100 MG: 100 INJECTION, POWDER, FOR SOLUTION INTRAMUSCULAR; INTRAVENOUS at 13:02

## 2022-03-08 RX ADMIN — VASOPRESSIN 0.04 UNITS/MIN: 20 INJECTION PARENTERAL at 19:44

## 2022-03-08 RX ADMIN — CHLORHEXIDINE GLUCONATE 0.12% ORAL RINSE 15 ML: 1.2 LIQUID ORAL at 18:54

## 2022-03-08 RX ADMIN — ROCURONIUM BROMIDE 100 MG: 10 INJECTION, SOLUTION INTRAVENOUS at 08:03

## 2022-03-08 RX ADMIN — FENTANYL CITRATE 50 MCG: 50 INJECTION INTRAMUSCULAR; INTRAVENOUS at 05:43

## 2022-03-08 ASSESSMENT — PULMONARY FUNCTION TESTS
PIF_VALUE: 33
PIF_VALUE: 31
PIF_VALUE: 35
PIF_VALUE: 52
PIF_VALUE: 33
PIF_VALUE: 32
PIF_VALUE: 34
PIF_VALUE: 33
PIF_VALUE: 51
PIF_VALUE: 32
PIF_VALUE: 35
PIF_VALUE: 45
PIF_VALUE: 31
PIF_VALUE: 33
PIF_VALUE: 33
PIF_VALUE: 32
PIF_VALUE: 32
PIF_VALUE: 34
PIF_VALUE: 56
PIF_VALUE: 31
PIF_VALUE: 32
PIF_VALUE: 33
PIF_VALUE: 32
PIF_VALUE: 31
PIF_VALUE: 33
PIF_VALUE: 32
PIF_VALUE: 3
PIF_VALUE: 33
PIF_VALUE: 42

## 2022-03-08 ASSESSMENT — PAIN SCALES - GENERAL
PAINLEVEL_OUTOF10: 0
PAINLEVEL_OUTOF10: 10
PAINLEVEL_OUTOF10: 0
PAINLEVEL_OUTOF10: 10
PAINLEVEL_OUTOF10: 6
PAINLEVEL_OUTOF10: 0

## 2022-03-08 ASSESSMENT — ENCOUNTER SYMPTOMS
VOMITING: 1
ABDOMINAL PAIN: 1
RHINORRHEA: 0
NAUSEA: 1
BACK PAIN: 0
SHORTNESS OF BREATH: 0
COUGH: 1
SORE THROAT: 0
DIARRHEA: 1

## 2022-03-08 NOTE — ED NOTES
Assumed care of patient. Patient over in xray(at7:30) went into resp distress. Patient was sedated and intubated in xray. Brought back to room 11, NGT, Hansen catheter and TLC placed. Patient maintain being hypotensive and febrile and tachycardia. Patient responds to voice. Medicated as ordered . Continue to to closely monitor patient.  Report called to ICU ext [place] [facility]  03/08/22 1877

## 2022-03-08 NOTE — H&P
[facility]   History and Physical      CHIEF COMPLAINT: Cough, fevers    Reason for Admission: Septic shock, acute hypoxemic respiratory failure requiring intubation    History Obtained From: EMR    HISTORY OF PRESENT ILLNESS:      The patient is a 74 y.o. female of [facility] with significant past medical history of multiple comorbidities including atrial fibrillation, DVTs on oral anticoagulation, morbid obesity, bilateral lower extremity lymphedema and chronically ill status who presents with complaints of cough, shortness of breath fevers and chills. Patient was evaluated in the emergency department and found to be hypotensive tachycardic and febrile. She was also noted to be in acute hypoxemic hypercarbic respiratory failure with pH of 7.2, PCO2 of 70. Secondary to her worsening status and agonal breathing, she was intubated for airway protection. Initiated on broad-spectrum IV antibiotics after blood cultures were drawn and subsequently transferred to ICU setting. On my evaluation she is intubated, on 3 pressors, on Versed and propofol along with IV antibiotic therapy. All labs personally reviewed-lactic acidosis, hypoxemic hypercarbic respiratory failure on ABGs  All imaging personally reviewed-CTA chest without evidence of pulmonary embolism or consolidation.   Patchy bibasilar infiltrates are noted    Past Medical History:        Diagnosis Date    (HFpEF) heart failure with preserved ejection fraction ([facility]     1/16/2018- echo- LVEF 55-65%    Anal fissure     Anemia 10/6/2017    Anxiety and depression     Arthritis     Asthma     Atrial fibrillation (HCC)     Eliquis    Blood transfusion     long time no reaction    CHF (congestive heart failure) (HCC)     Diastolic    COPD (chronic obstructive pulmonary disease) (HCC)     Disc disorder     DM2 (diabetes mellitus, type 2) ([facility] [address]     on insulin    Fall due to stumbling 10/6/2017    GERD (gastroesophageal reflux disease)     History of blood transfusion     Hx of blood clots     Hyperlipidemia     Hypertension     Inappropriate sinus tachycardia     LVH (left ventricular hypertrophy)     moderate    Lymphadenitis, chronic 4/13/2018    Occipital neuralgia 11/2/2011    Respiratory acidosis 10/7/2017    Sinus tachycardia 2/16/2015     Past Surgical History:        Procedure Laterality Date    ANOSCOPY  10/25/2006    injection of anal sphincter with Botox, Franklyn Ortiz/Timmy, [facility]    ANOSCOPY  4/9/2007    injection of anal sphincter with Botox, Dr. Barbie Serra, [address] [place] [address] ANOSCOPY  6/13/2007    injection of anal sphincter with Botox, Franklyn Chakraborty [facility]    ANUS SURGERY  4/4/2006    Lateral [facility] f[facility]  4/[facility]/2012    anal exam and injection of Botox 100 units into anal sphincter, Laila Chakraborty, [facility]    APPENDECTOMY  1965   [address]    COLONOSCOPY  1/20/2004    cecal polyp, bx (no pathologic changes), Dr. Todd Ballard, [facility] COLONOSCOPY  8/11/2006    snare polypectomy terminal ileum polyp (granulation tissue polyp) and anal polyp (inflammatory/papilla), Dr. Barbie Serra, [facility] COLONOSCOPY  3/23/2012    bx/cauterization proximal ascending colon polyp, injection of anal sphincter with Botox, Dr. Barbie Serra, [facility]    ENDOSCOPY, COLON, DIAGNOSTIC      FRACTURE SURGERY      R leg fracture with surgery for repair    JOINT REPLACEMENT  2003    L knee    MA DEBRIDEMENT, SKIN, SUB-Q TISSUE,MUSCLE,=<20 SQ CM Left 11/30/2018    LEFT LEG EXCISIONAL  DEBRIDEMENT WITH TISSUE BIOPSY performed by Bozena Eckert DPM at [address] ENDOSCOPY  1/20/2004    gastritis, bx (no H pylori), Dr. Todd Ballard, [address] ENDOSCOPY N/A 6/1/2018    EGD BIOPSY performed by Zenon Barrios MD at [address] [facility] ENDOSCOPY N/A 11/9/2018    EGD BIOPSY performed by Ryan Britton MD at [facility] ENDOSCOPY         Medications Prior to Admission:    Medications Prior to Admission: docusate sodium (COLACE) 100 MG capsule, Take 100 mg by mouth nightly  DULoxetine (CYMBALTA) 30 MG extended release capsule, Take 30 mg by mouth every morning *SEE OTHER ORDER*  DULoxetine (CYMBALTA) 60 MG extended release capsule, Take 60 mg by mouth nightly *SEE OTHER ORDER*  dilTIAZem (DILACOR XR) 240 MG extended release capsule, Take 240 mg by mouth 2 times daily Indications: -HOLD FOR SBP<100 OR HR <60-  insulin lispro (HUMALOG) 100 UNIT/ML injection vial, Inject 5 Units into the skin once **DUE TO BS OF  512**  insulin lispro (HUMALOG) 100 UNIT/ML injection vial, Inject 35 Units into the skin 3 times daily (with meals) *PLUS SLIDING SCALE*  insulin lispro (HUMALOG) 100 UNIT/ML injection vial, Inject 0-15 Units into the skin 3 times daily (with meals) PER SLIDING SCALE: 0-149=0U, 150-200=3U, 201-250=6U, 251-300=9U, 301-50=12U, 351+=15U & CALL DR. Bianca Abraham ([phone]) *SEE OTHER ORDERS*  insulin lispro (HUMALOG) 100 UNIT/ML injection vial, Inject 0-5 Units into the skin nightly PER SLIDING SCALE: 0-199=0U, 200-250=2U, 251-300=3U, 301-350=4U, 351+=5U *SEE OTHER ORDERS*  hydrOXYzine (ATARAX) 50 MG tablet, Take 50 mg by mouth 3 times daily  insulin glargine (LANTUS) 100 UNIT/ML injection vial, Inject 49 Units into the skin every morning *SEE OTHER ORDER*  Melatonin 10 MG TABS, Take 10 mg by mouth nightly  promethazine (PHENERGAN) 25 MG/ML injection, Inject 25 mg into the muscle once  ipratropium (ATROVENT) 0.02 % nebulizer solution, Take 0.5 mg by nebulization 3 times daily *SEE OTHER ORDER*  ipratropium (ATROVENT) 0.02 % nebulizer solution, Take 0.5 mg by nebulization every 3 hours as needed for Wheezing *SEE OTHER ORDER*  bisacodyl (DULCOLAX) 10 MG suppository, Place 10 mg rectally daily as needed for Constipation  ferrous sulfate (IRON 325) 379 (58 Fe) MG tablet, Take 325 mg by mouth 2 times daily  magnesium hydroxide (MILK OF MAGNESIA) 400 MG/5ML suspension, Take 30 mLs by mouth daily as needed for Constipation  Multiple Vitamins-Minerals (THERAPEUTIC MULTIVITAMIN-MINERALS) tablet, Take 1 tablet by mouth daily  omeprazole (PRILOSEC) 40 MG delayed release capsule, Take 40 mg by mouth Daily   ezetimibe (ZETIA) 10 MG tablet, Take 1 tablet by mouth nightly  metoprolol succinate (TOPROL XL) 100 MG extended release tablet, Take 1 tablet by mouth 2 times daily  insulin glargine (LANTUS SOLOSTAR) 100 UNIT/ML injection pen, Inject 52 Units into the skin nightly *SEE OTHER ORDER*  miconazole (MICOTIN) 2 % powder, Apply topically 2 times daily -BREAST & ABD FOLDS-  mineral oil-hydrophilic petrolatum (AQUAPHOR) ointment, Apply topically every 8 hours as needed (DRY SKIN TO THE ARMS & LEGS)   acetaminophen (TYLENOL) 500 MG tablet, Take 1,000 mg by mouth every 6 hours as needed for Pain Indications: -NTE: 3G/24HRS-   apixaban (ELIQUIS) 5 MG TABS tablet, Take 5 mg by mouth 2 times daily  bumetanide (BUMEX) 2 MG tablet, Take 2 mg by mouth 2 times daily  gabapentin (NEURONTIN) 600 MG tablet, Take 600 mg by mouth 3 times daily. vitamin D (ERGOCALCIFEROL) 1.25 MG (01767 UT) CAPS capsule, Take 50,000 Units by mouth once a week *MONDAYS*  omega-3 acid ethyl esters (LOVAZA) 1 g capsule, Take 2 capsules by mouth 2 times daily  spironolactone (ALDACTONE) 25 MG tablet, Take 1 tablet by mouth daily    Allergies:  Statins    Social History:   TOBACCO:   reports that she quit smoking about 17 years ago. Her smoking use included cigarettes. She started smoking about 42 years ago. She has a 37.50 pack-year smoking history. She has never used smokeless tobacco.  ETOH:   reports previous alcohol use.   MARITAL STATUS:    OCCUPATION:      Family History:       Problem Relation Age of Onset    Heart Disease Mother     Diabetes Father     Colon Cancer Father     Other Father BLINDNESS    Colon Cancer Sister     Other Sister         shingles- has had over 1 year    Diabetes Paternal Aunt     Diabetes Paternal Uncle     Diabetes Paternal Aunt     Diabetes Paternal Uncle     Diabetes Sister        REVIEW OF SYSTEMS:    General ROS: Unable to obtain secondary to intubated status  Hematological and Lymphatic ROS: negative  Endocrine ROS: negative  Respiratory ROS: no cough, shortness of breath, or wheezing  Cardiovascular ROS: no chest pain or dyspnea on exertion  Gastrointestinal ROS: no abdominal pain, change in bowel habits, or black or bloody stools  Genito-Urinary ROS: no dysuria, trouble voiding, or hematuria  Neurological ROS: no TIA or stroke symptoms  negative    Vitals:  BP (!) 126/92   Pulse 105   Temp 101.3 °F (38.5 °C) (Bladder)   Resp 22   Ht 5' 4\" (1.626 m)   Wt 298 lb (135.2 kg)   LMP  (LMP Unknown)   SpO2 100%   BMI 51.15 kg/m²     PHYSICAL EXAM:  General: Intubated sedated in ICU setting, morbidly obese woman but appears chronically ill  HEENT:  Normocephalic, atraumatic. Pupils equal, round, reactive to light. No scleral icterus. No conjunctival injection. Normal lips, teeth, and gums. No nasal discharge. Neck:  Supple, FROM  Heart:  RRR, no murmurs, gallops, rubs, carotid upstroke normal, no carotid bruits  Lungs:  CTA bilaterally, bilat symmetrical expansion, no wheeze, rales, or rhonchi  Abdomen:  Morbidly obese bowel sounds present, soft, nontender, no masses, no organomegaly, no peritoneal signs  Extremities: Bilateral lymphedema with ichthyosis and cellulitis of both legs  Skin:  Warm and dry, no open lesions or rash  Neuro:  Cranial nerves 2-12 intact, no focal deficits  Vascular: Radial and pedal Pulses 2+  Breast: deferred  Rectal: deferred  Genitalia:  deferred      DATA:     Recent Labs     03/08/22  0610   WBC 7.3   HGB 10.0*        Recent Labs     03/08/22  0610 03/08/22  0722     --    K 5.7* 3.8   BUN 31*  --    CREATININE 0.9  --      Recent Labs     03/08/22  0610   PROT 7.2     Recent Labs     03/08/22  0610   AST 34*   ALT 19   ALKPHOS 103   BILITOT 0.3     No results for input(s): BNP in the last 72 hours. No results for input(s): CKTOTAL, CKMB, CKMBINDEX, TROPONINI in the last 72 hours. ASSESSMENT:      Principal Problem:    Sepsis ([facility] [address]  Resolved Problems:    * No resolved hospital problems.  *        PLAN:    58-year-old woman with multiple comorbidities admitted to ICU setting for septic shock on triple pressor support, broad-spectrum IV antibiotic therapy, intubated sedated    IV fluid resuscitation for marked lactic acidosis  Continue pressor support, wean as able, on Solu-Cortef 3 times daily-wean as blood pressure improves  Pancultures pending-blood cultures have not yet resulted  Broad-spectrum IV antibiotics with infectious disease following  On amiodarone drip  Glargine, insulin sliding scale for blood sugar management, consider insulin drip in critically ill woman  Monitor renal function for possible prerenal failure given hypotension  No evidence of diabetic ketoacidosis  Daily labs        DVT prophylaxis  PT OT  Discharge plan    Jaren Galeano MD  3/8/2022  6:25 PM

## 2022-03-08 NOTE — ED PROVIDER NOTES
[address]  Department of Emergency Medicine     Written by: Dominique Jimenez DO  Patient Name: Drea Fernandez  Attending Provider: Vahid Schaefer DO  Admit Date: 3/8/2022  4:28 AM  MRN: [id number]    : 1955        Chief Complaint   Patient presents with    Cough     started a couple days ago    Hyperglycemia      per EMS    Chills    Nausea     Zofran given per EMS    - Chief complaint    HPI   Drea Fernandez is a 74 y.o. female presenting to the ED for evaluation of Cough (started a couple days ago), Hyperglycemia ( per EMS), Chills, and Nausea (Zofran given per EMS)    Patient has extensive past medical history; includes paroxysmal atrial fibrillation on 240 mg p.o. Cardizem twice daily, as well as chronic DVT of right lower extremity, for which she is on 5 mg Eliquis twice daily; history also includes pulmonary hypertension, HLD, GERD, HTN, COPD on baseline 6 L NC O2, CHF, chronic lymphedema and chronic lower extremity wounds. She presents from a nursing facility for evaluation of cough, swelling and pain of bilateral lower extremities, abdominal pain, nausea, emesis, diarrhea; symptoms have been present for a few days, although the swelling and pain in her bilateral lower extremities is chronic and acutely worsened and this time. Per EMS patient's blood glucose was 442. Patient's complaints are severe in severity, and persistent. Denies specific aggravating or alleviating factors. Denies any measured fevers, neck pain or stiffness, chest pain or palpitations, shortness of breath, black or bloody stools, urinary symptoms, numbness or tingling anywhere. Review of Systems   Constitutional: Positive for chills and fatigue. Negative for fever. HENT: Negative for rhinorrhea and sore throat. Eyes: Negative for visual disturbance. Respiratory: Positive for cough. Negative for shortness of breath. Cardiovascular: Positive for leg swelling.  Negative for chest pain and palpitations. Gastrointestinal: Positive for abdominal pain, diarrhea, nausea and vomiting. Genitourinary: Negative for dysuria and frequency. Musculoskeletal: Negative for back pain and myalgias. Skin: Positive for wound (Bilateral lower extremities, various, chronic). Negative for rash. Neurological: Negative for numbness and headaches. Psychiatric/Behavioral: Negative for confusion. All other systems reviewed and are negative. Physical Exam  Vitals and nursing note reviewed. Constitutional:       General: She is not in acute distress. Appearance: She is obese. She is ill-appearing. HENT:      Head: Normocephalic and atraumatic. Right Ear: External ear normal.      Left Ear: External ear normal.      Nose: Nose normal. No rhinorrhea. Mouth/Throat:      Mouth: Mucous membranes are dry. Pharynx: Oropharynx is clear. Eyes:      Extraocular Movements: Extraocular movements intact. Conjunctiva/sclera: Conjunctivae normal.      Pupils: Pupils are equal, round, and reactive to light. Cardiovascular:      Rate and Rhythm: Regular rhythm. Tachycardia present. Pulses: Normal pulses. Heart sounds: Normal heart sounds. Pulmonary:      Effort: Pulmonary effort is normal. No respiratory distress. Breath sounds: Rales (Bilateral scattered) present. No wheezing. Abdominal:      General: Bowel sounds are normal.      Palpations: Abdomen is soft. Tenderness: There is abdominal tenderness (Generalized, worse in epigastric region, mild). There is no right CVA tenderness, left CVA tenderness, guarding or rebound. Musculoskeletal:         General: Tenderness (Bilateral lower extremities) present. Normal range of motion. Cervical back: Normal range of motion and neck supple. Right lower leg: Edema (Lymphedema) present. Left lower leg: Edema (Lymphedema) present.       Comments: Chronic bilateral lower extremity venous stasis changes and lymphedema, various superficial appearing wounds, there is one deeper wound that is due to cracked appearing skin which is packed with gauze and appears to have purulence culture obtained. Skin:     General: Skin is warm and dry. Capillary Refill: Capillary refill takes less than 2 seconds. Coloration: Skin is not jaundiced or pale. Findings: Erythema (Right lower leg) present. Comments: Skin is extremely callused, edematous, difficult to palpate lower extremity pulses; detected via Doppler; good capillary refill   Neurological:      General: No focal deficit present. Mental Status: She is alert and oriented to person, place, and time. Psychiatric:         Mood and Affect: Mood normal.         Behavior: Behavior normal.          Central Line Placement Procedure Note     Indication: vascular access, centrally administered medications and need for frequent blood draws    Consent: Unable to be obtained due to the emergent nature of this procedure. Procedure: The patient was positioned appropriately and the skin over the left internal jugular vein was prepped with Chloraprep and draped in a sterile fashion. Local anesthesia was not performed as patient on systemic sedation/analgesia. A large bore needle and ultrasound was used to identify the vein. A guide wire was then inserted into the vein through the needle. A triple lumen catheter was then inserted into the vessel over the guide wire using the Seldinger technique. All ports showed good, free flowing blood return and were flushed with saline solution. The catheter was then securely fastened to the skin with suture at 18 cm. Two sutures were placed into the kit included tube clamp and proximal eyelets. An antibiotic disk was placed and the site was then covered with a sterile dressing. A post procedure X-ray was ordered and showed good line position. The patient tolerated the procedure well.     Complications: None    Nuha Delgado MD, PGY 3    Procedure Note:  The patients care necessitated an ABG be obtained. The patients right femoral artery was localized in the superior thigh. Ultrasound guidance was used. The patient area was prepped with chlorhexidine solution and allowed to dry. Arterial puncture was performed under sterile technique with pulsatile arterial flow into the syringe. The needle is removed and pressure applied to the site. Bleeding is controlled and a bandaid is applied with no further bleeding. Nuha Dlegado MD, PGY 3         Intubation Procedure Note    Indication: Respiratory failure    Consent: Unable to be obtained due to the emergent nature of this procedure. Medications Used: etomidate intravenously and rocuronium intravenously    Procedure: The patient was placed in the appropriate position. Cricoid pressure was utilized. Intubation was performed by direct laryngoscopy using a laryngoscope and a 7.5 cuffed endotracheal tube. The cuff was then inflated and the tube was secured appropriately at a distance of 24 cm to the lip. Initial confirmation of placement included bilateral breath sounds, an end tidal CO2 detector, absence of sounds over the stomach, tube fogging, adequate chest rise, adequate pulse oximetry reading and improved skin color. A chest x-ray to verify correct placement of the tube showed a right mainstem intubation and the tube was repositioned appropriately. The patient tolerated the procedure well. Complications: None    Jagdeep Cardozo DO, PGY-2      [facility]     ED Course as of 03/08/22 1722   Tue Mar 08, 2022   7835 Patient persistently tachycardic in the 140s; appears to be sinus rhythm on EKG.   She is febrile and likely septic however given the persistence of this tachycardia despite 1 L fluid bolus (it is completely unchanged), CTA pulmonary study ordered additionally to evaluate for possible pulmonary embolism. [VG]   0656 Repeat potassium and troponin ordered as lab called and they are hemolyzed. [VG]   [id number] ECHO 4/14/2021:    Ejection fraction is visually estimated at 60-65%. [VG]   [id number] Patient currently in [address]. [VG]   8461 X-ray technician called over to the ER to have a provider come evaluate this patient as she was saying that she felt like she could not breathe. When I arrived patient was agonal breathing, unresponsive, pulses intact, and cyanotic appearing on her 6 L NC O2 nasal cannula. We immediately began BVM ventilation and prepared to intubate as documented separately. Patient's vitals are notable for tachycardia varying in the 150s-170s, once BVM was initiated and pulse ox placed SPO2 was noted to be 96%. Patient remained agonal and continued to have palpable pulses. RSI was performed and patient placed on ventilator, propofol ordered for sedation, x-rays of patient's bilateral lower extremities will be reviewed however it does appear that patient's right lower extremity may be becoming more red/discolored than previously during this encounter, CTs ordered to evaluate for possible abscesses. [VG]   1253 PCO2(!!): 105.4  Respiratory therapist notified; rate increased to 26. [VG]   3525 ABG likely venous sample. [VG]   1673 CXR reviewed, right mainstem bronchus; ET tube is currently at 25 cm at the lip, pulled back to 22 cm; repeat CXR pending [VG]   0810 Magnesium(!): 1.4  4 mg IV magnesium ordered. [VG]   [id number] Patient additionally started on IV clindamycin given concern for bilateral lower extremity infections.  [VG]   6219 Patient was discussed with Dr. So Delong and accepted to the ICU. [QC]   [phone] with nurse practitioner for DAR[facility], patient was accepted [QC]      ED Course User Index  [QC] Rohini Silvestre MD  [VG] Amirah Carvalho,        [facility]    This is a 74 y.o. female presenting for evaluation of  cough, swelling and pain of bilateral lower extremities, abdominal pain, nausea, emesis, diarrhea; symptoms have been present for a few days, although the swelling and pain in her bilateral lower extremities is chronic and acutely worsened and this time. Also hyperglycemic per EMS. On arrival patient was alert and oriented, answering questions appropriately; she was ill-appearing. She had vital signs significant for fever 103.1 °F, tachycardia in the 140s, EKG showing sinus tachycardia; blood pressure initially systolic in the 719H. Normal respirations. Bilateral scattered rales. SPO2 was 96% on baseline 6 L NC O2. She had mild diffuse abdominal tenderness without rebound or guarding. She also has chronic bilateral lower extremity venous stasis changes and lymphedema, various superficial appearing wounds, there is one deeper wound that is due to cracked appearing skin which is packed with gauze and appears to have purulence; culture obtained. Exam and work-up ensued; labs were obtained and showed lactic acidosis, troponin 37, magnesium 1.4, rapid Covid was negative, lipase 12; CBC showed no leukocytosis and Hb 10.0, near baseline. Glucose 358; anion gap 12, bicarb was 31. Potassium was 5.7 but hemolyzed. Repeat pending. Venous pH was 7.32. Patient was due to undergo imaging studies to evaluate for her bilateral lower extremity swelling and infection, as well as abdominal tenderness and additionally CTA to evaluate for possible PE given that patient had such persistent tachycardia despite fluid boluses. While undergoing her imaging studies, x-ray technician called over to the ER to have a provider come evaluate her as she was saying that she felt like she could not breathe. When I arrived, the patient was agonal breathing, unresponsive, pulses intact, and cyanotic appearing on her 6 L NC O2 nasal cannula. We immediately began BVM ventilation and prepared to intubate as documented separately.   Patient's vitals at this time were notable for tachycardia varying in the 150s-170s; once BVM was initiated and pulse ox sensed a waveform, SPO2 was noted to be 96%. Patient remained agonal and continued to have palpable pulses. RSI was performed and patient placed on ventilator, propofol ordered for sedation, x-rays of patient's bilateral lower extremities will be reviewed however it does appear that patient's right lower extremity may be becoming more red/discolored than previously during this encounter, additional CTs of b/l tib/fib regions ordered to evaluate for possible abscesses. Results at this point thus far were reviewed, CTA pulm showed no pulmonary embolus; does show dilatation of pulmonary trunk and right and left main pulmonary arteries \"consistent with pulmonary hypertension\"; also shows patchy bibasilar infiltrates favored to represent pneumonia. CT abdomen pelvis shows varying lymphadenopathy which is reportedly increased compared to priors; there is a right adrenal mass which is reported to have increased in size from previous studies, measuring 5 cm; report notes that this finding is \"concerning for metastatic disease or primary malignancy until proven otherwise. \"  GB appears distended and otherwise unremarkable; correlated clinically, patient does have normal LFTs. At this time highly suspect patient's current condition is related to multifactorial sepsis likely due to pneumonia and infection of her bilateral lower extremities as well; RUQ US deferred at this time. Results of bilateral tib-fib CTs showed significant diffuse subcutaneous soft tissue edema without evidence of soft tissue gas, mass, or abnormal fluid collection. At this time patient was signed out to oncoming resident and attending with plan to consult critical care and medicine; at this time patient was also noted to become more hypotensive and plan was made to place CVC which was performed as documented separately. Patient ultimately ended up requiring initiation of vasopressors. She was accepted for admission to the ICU.       EKG reviewed and interpreted by me:  0525  Rate is 150; rhythm is sinus; interpretation is sinus tachycardia, left posterior fascicular block, , , QTc 458; no STEMI. Changes as compared to previous EKG. 9067  Rate is 132; rhythm is sinus; interpretation is sinus tachycardia, RAD, incomplete RBBB, , , QTc 474, no ST elevations; changes compared to previous EKG. I have discussed this patient with my attending, who has seen the patient and agrees with this disposition. Patient was seen and evaluated by myself and my attending No att. providers found. Assessment and Plan discussed with attending provider, please see attestation for final plan of care.         --------------------------------------------- PAST HISTORY ---------------------------------------------  Past Medical History:  has a past medical history of (HFpEF) heart failure with preserved ejection fraction ([facility] [address] Anal fissure, Anemia, Anxiety and depression, Arthritis, Asthma, Atrial fibrillation ([facility] [address] Blood transfusion, CHF (congestive heart failure) ([facility] [address] COPD (chronic obstructive pulmonary disease) ([facility] [address] Disc disorder, DM2 (diabetes mellitus, type 2) ([facility] [address] Fall due to stumbling, GERD (gastroesophageal reflux disease), History of blood transfusion, Hx of blood clots, Hyperlipidemia, Hypertension, Inappropriate sinus tachycardia, LVH (left ventricular hypertrophy), Lymphadenitis, chronic, Occipital neuralgia, Respiratory acidosis, and Sinus tachycardia. Past Surgical History:  has a past surgical history that includes Tonsillectomy (); Appendectomy ();  section ();  section ();  section (); Anus surgery (2006); Upper gastrointestinal endoscopy (2004); Colonoscopy (2004); Colonoscopy (2006); anoscopy (10/25/2006); anoscopy (2007); anoscopy (2007); Colonoscopy (3/23/2012); Anus surgery (2012); Upper gastrointestinal endoscopy (N/A, 2018);  Upper gastrointestinal endoscopy (N/A, 11/9/2018); pr debridement, skin, sub-q tissue,muscle,=<20 sq cm (Left, 11/30/2018); Endoscopy, colon, diagnostic; fracture surgery; and joint replacement (2003). Social History:  reports that she quit smoking about 17 years ago. Her smoking use included cigarettes. She started smoking about 42 years ago. She has a 37.50 pack-year smoking history. She has never used smokeless tobacco. She reports previous alcohol use. She reports previous drug use. Family History: family history includes Colon Cancer in her father and sister; Diabetes in her father, paternal aunt, paternal aunt, paternal uncle, paternal uncle, and sister; Heart Disease in her mother; Other in her father and sister. The patients home medications have been reviewed.     Allergies: Statins    -------------------------------------------------- RESULTS -------------------------------------------------    LABS:  Results for orders placed or performed during the hospital encounter of 03/08/22   COVID-19, Rapid    Specimen: Nasopharyngeal Swab   Result Value Ref Range    SARS-CoV-2, NAAT Not Detected Not Detected   Respiratory Panel, Molecular, with COVID-19 (Restricted: peds pts or suitable admitted adults)    Specimen: Nasopharyngeal; Nares   Result Value Ref Range    Adenovirus by PCR Not Detected Not Detected    Bordetella parapertussis by PCR Not Detected Not Detected    Bordetella pertussis by PCR Not Detected Not Detected    Chlamydophilia pneumoniae by PCR Not Detected Not Detected    Coronavirus 229E by PCR Not Detected Not Detected    Coronavirus HKU1 by PCR Not Detected Not Detected    Coronavirus NL63 by PCR Not Detected Not Detected    Coronavirus OC43 by PCR Not Detected Not Detected    SARS-CoV-2, PCR Not Detected Not Detected    Human Metapneumovirus by PCR Not Detected Not Detected    Human Rhinovirus/Enterovirus by PCR Not Detected Not Detected    Influenza A by PCR Not Detected Not Detected    Influenza B by PCR Not Detected Not Detected    Mycoplasma pneumoniae by PCR Not Detected Not Detected    Parainfluenza Virus 1 by PCR Not Detected Not Detected    Parainfluenza Virus 2 by PCR Not Detected Not Detected    Parainfluenza Virus 3 by PCR Not Detected Not Detected    Parainfluenza Virus 4 by PCR Not Detected Not Detected    Respiratory Syncytial Virus by PCR Not Detected Not Detected   CBC with Auto Differential   Result Value Ref Range    WBC 7.3 4.5 - 11.5 E9/L    RBC 3.97 3.50 - 5.50 E12/L    Hemoglobin 10.0 (L) 11.5 - 15.5 g/dL    Hematocrit 34.5 34.0 - 48.0 %    MCV 86.9 80.0 - 99.9 fL    MCH 25.2 (L) 26.0 - 35.0 pg    MCHC 29.0 (L) 32.0 - 34.5 %    RDW 14.6 11.5 - 15.0 fL    Platelets 327 564 - 518 E9/L    MPV 9.5 7.0 - 12.0 fL    Neutrophils % 91.1 (H) 43.0 - 80.0 %    Immature Granulocytes % 0.4 0.0 - 5.0 %    Lymphocytes % 6.1 (L) 20.0 - 42.0 %    Monocytes % 1.7 (L) 2.0 - 12.0 %    Eosinophils % 0.6 0.0 - 6.0 %    Basophils % 0.1 0.0 - 2.0 %    Neutrophils Absolute 6.61 1.80 - 7.30 E9/L    Immature Granulocytes # 0.03 E9/L    Lymphocytes Absolute 0.44 (L) 1.50 - 4.00 E9/L    Monocytes Absolute 0.12 0.10 - 0.95 E9/L    Eosinophils Absolute 0.04 (L) 0.05 - 0.50 E9/L    Basophils Absolute 0.01 0.00 - 0.20 E9/L    RBC Morphology Normal    Comprehensive Metabolic Panel w/ Reflex to MG   Result Value Ref Range    Sodium 139 132 - 146 mmol/L    Potassium reflex Magnesium 5.7 (H) 3.5 - 5.0 mmol/L    Chloride 96 (L) 98 - 107 mmol/L    CO2 31 (H) 22 - 29 mmol/L    Anion Gap 12 7 - 16 mmol/L    Glucose 358 (H) 74 - 99 mg/dL    BUN 31 (H) 6 - 23 mg/dL    CREATININE 0.9 0.5 - 1.0 mg/dL    GFR Non-African American >60 >=60 mL/min/1.73    GFR African American >60     Calcium 9.2 8.6 - 10.2 mg/dL    Total Protein 7.2 6.4 - 8.3 g/dL    Albumin 3.8 3.5 - 5.2 g/dL    Total Bilirubin 0.3 0.0 - 1.2 mg/dL    Alkaline Phosphatase 103 35 - 104 U/L    ALT 19 0 - 32 U/L    AST 34 (H) 0 - 31 U/L   Lipase   Result Value Ref Range    Lipase 12 (L) 13 - 60 U/L   Brain Natriuretic Peptide   Result Value Ref Range    Pro- 0 - 125 pg/mL   Beta-Hydroxybutyrate   Result Value Ref Range    Beta-Hydroxybutyrate 0.15 0.02 - 0.27 mmol/L   PH, VENOUS   Result Value Ref Range    pH, Sage 7.32 (L) 7.35 - 7.45   Lactate, Sepsis   Result Value Ref Range    Lactic Acid, Sepsis 3.4 (H) 0.5 - 1.9 mmol/L   Lactate, Sepsis   Result Value Ref Range    Lactic Acid, Sepsis 6.5 (HH) 0.5 - 1.9 mmol/L   SPECIMEN REJECTION   Result Value Ref Range    Rejected Test trp5     Reason for Rejection see below    Troponin   Result Value Ref Range    Troponin, High Sensitivity 37 (H) 0 - 9 ng/L   Potassium   Result Value Ref Range    Potassium 3.8 3.5 - 5.0 mmol/L   Magnesium   Result Value Ref Range    Magnesium 1.4 (L) 1.6 - 2.6 mg/dL   Blood Gas, Arterial   Result Value Ref Range    Date Analyzed 20220308     Time Analyzed 0820     Source: Blood Arterial     pH, Blood Gas 7.166 (LL) 7.350 - 7.450    PCO2 105.4 (HH) 35.0 - 45.0 mmHg    PO2 47.5 (L) 75.0 - 100.0 mmHg    HCO3 37.2 (H) 22.0 - 26.0 mmol/L    B.E. 6.1 (H) -3.0 - 3.0 mmol/L    O2 Sat 76.2 (L) 92.0 - 98.5 %    O2Hb 75.1 (L) 94.0 - 97.0 %    COHb 1.1 0.0 - 1.5 %    MetHb 0.3 0.0 - 1.5 %    O2 Content 11.0 mL/dL    HHb 23.5 (H) 0.0 - 5.0 %    tHb (est) 10.4 (L) 11.5 - 16.5 g/dL    Date Of Collection      Time Collected      Pt Temp 37.0 C     ID [id number]     Lab [id number]     Critical(s) Notified Handed report to Dr/RN    Troponin   Result Value Ref Range    Troponin, High Sensitivity 48 (H) 0 - 9 ng/L   Blood Gas, Arterial   Result Value Ref Range    Date Analyzed 20220308     Time Analyzed 1047     Source: Blood Arterial     pH, Blood Gas 7.278 (L) 7.350 - 7.450    PCO2 70.6 (HH) 35.0 - 45.0 mmHg    PO2 52.8 (L) 75.0 - 100.0 mmHg    HCO3 32.3 (H) 22.0 - 26.0 mmol/L    B.E. 4.3 (H) -3.0 - 3.0 mmol/L    O2 Sat 85.4 (L) 92.0 - 98.5 %    PO2/FIO2 0.53 mmHg/%    O2Hb 84.2 (L) 94.0 - 97.0 %    COHb 1.1 0.0 - 1.5 %    MetHb 0.3 0.0 - 1.5 %    O2 Content 10.7 mL/dL    HHb 14.4 (H) 0.0 - 5.0 %    tHb (est) 9.0 (L) 11.5 - 16.5 g/dL    Mode AC     FIO2 100.0 %    Rr Mechanical 26.0 b/min    Vt Mechanical 420.0 mL    Peep/Cpap 6.0 cmH2O    Nalini Crump RN with read back     Date Of Collection      Time Collected      Pt Temp 37.0 C     ID [id number]     Lab [id number]     Critical(s) Notified Called to    Cortisol Total   Result Value Ref Range    Cortisol 165.30 (H) 2.68 - 18.40 mcg/dL   Sedimentation Rate   Result Value Ref Range    Sed Rate 30 (H) 0 - 20 mm/Hr   C-Reactive Protein   Result Value Ref Range    CRP 13.7 (H) 0.0 - 0.4 mg/dL   Antistreptolysin O Titer   Result Value Ref Range     (H) 0 - 200 IU/mL   Lactic Acid   Result Value Ref Range    Lactic Acid 6.0 (HH) 0.5 - 2.2 mmol/L   Blood Gas, Arterial   Result Value Ref Range    Date Analyzed 20220308     Time Analyzed 1601     Source: Blood Arterial     pH, Blood Gas 7.477 (H) 7.350 - 7.450    PCO2 35.1 35.0 - 45.0 mmHg    PO2 280.9 (H) 75.0 - 100.0 mmHg    HCO3 25.4 22.0 - 26.0 mmol/L    B.E. 1.9 -3.0 - 3.0 mmol/L    O2 Sat 99.6 (H) 92.0 - 98.5 %    PO2/FIO2 2.81 mmHg/%    O2Hb 98.7 (H) 94.0 - 97.0 %    COHb 0.6 0.0 - 1.5 %    MetHb 0.3 0.0 - 1.5 %    O2 Content 14.5 mL/dL    HHb 0.4 0.0 - 5.0 %    tHb (est) 9.9 (L) 11.5 - 16.5 g/dL    Mode AC     FIO2 100.0 %    Rr Mechanical 26.0 b/min    Vt Mechanical 420.0 mL    Peep/Cpap 8.0 cmH2O    Date Of Collection      Time Collected      Pt Temp 37.0 C     ID [id number]     Lab [id number]     Critical(s) Notified .  No Critical Values    Magnesium   Result Value Ref Range    Magnesium 1.9 1.6 - 2.6 mg/dL   Cortisol Total   Result Value Ref Range    Cortisol 35.06 (H) 2.68 - 18.40 mcg/dL   Magnesium   Result Value Ref Range    Magnesium 2.0 1.6 - 2.6 mg/dL   Magnesium   Result Value Ref Range    Magnesium 2.1 1.6 - 2.6 mg/dL   Magnesium   Result Value Ref Range    Magnesium 2.0 1.6 - 2.6 mg/dL   Phosphorus   Result Value Ref Range    Phosphorus 2.3 (L) 2.5 - 4.5 mg/dL   CBC with Auto Differential   Result Value Ref Range    WBC 22.5 (H) 4.5 - 11.5 E9/L    RBC 3.59 3.50 - 5.50 E12/L    Hemoglobin 9.2 (L) 11.5 - 15.5 g/dL    Hematocrit 29.6 (L) 34.0 - 48.0 %    MCV 82.5 80.0 - 99.9 fL    MCH 25.6 (L) 26.0 - 35.0 pg    MCHC 31.1 (L) 32.0 - 34.5 %    RDW 15.5 (H) 11.5 - 15.0 fL    Platelets 530 587 - 761 E9/L    MPV 10.2 7.0 - 12.0 fL    Neutrophils % 87.2 (H) 43.0 - 80.0 %    Immature Granulocytes % 4.5 0.0 - 5.0 %    Lymphocytes % 4.5 (L) 20.0 - 42.0 %    Monocytes % 3.6 2.0 - 12.0 %    Eosinophils % 0.0 0.0 - 6.0 %    Basophils % 0.2 0.0 - 2.0 %    Neutrophils Absolute 19.64 (H) 1.80 - 7.30 E9/L    Immature Granulocytes # 1.01 E9/L    Lymphocytes Absolute 1.01 (L) 1.50 - 4.00 E9/L    Monocytes Absolute 0.80 0.10 - 0.95 E9/L    Eosinophils Absolute 0.00 (L) 0.05 - 0.50 E9/L    Basophils Absolute 0.05 0.00 - 0.20 E9/L    RBC Morphology Normal    Basic Metabolic Panel   Result Value Ref Range    Sodium 139 132 - 146 mmol/L    Potassium 4.0 3.5 - 5.0 mmol/L    Chloride 99 98 - 107 mmol/L    CO2 25 22 - 29 mmol/L    Anion Gap 15 7 - 16 mmol/L    Glucose 288 (H) 74 - 99 mg/dL    BUN 36 (H) 6 - 23 mg/dL    CREATININE 1.2 (H) 0.5 - 1.0 mg/dL    GFR Non-African American 45 >=60 mL/min/1.73    GFR African American 54     Calcium 8.5 (L) 8.6 - 10.2 mg/dL   Basic Metabolic Panel   Result Value Ref Range    Sodium 139 132 - 146 mmol/L    Potassium 4.5 3.5 - 5.0 mmol/L    Chloride 98 98 - 107 mmol/L    CO2 26 22 - 29 mmol/L    Anion Gap 15 7 - 16 mmol/L    Glucose 181 (H) 74 - 99 mg/dL    BUN 37 (H) 6 - 23 mg/dL    CREATININE 1.3 (H) 0.5 - 1.0 mg/dL    GFR Non-African American 41 >=60 mL/min/1.73    GFR African American 49     Calcium 9.0 8.6 - 10.2 mg/dL   Basic Metabolic Panel   Result Value Ref Range    Sodium 138 132 - 146 mmol/L    Potassium 4.4 3.5 - 5.0 mmol/L    Chloride 98 98 - 107 mmol/L    CO2 27 22 - 29 mmol/L    Anion Gap 13 7 - 16 mmol/L Glucose 138 (H) 74 - 99 mg/dL    BUN 39 (H) 6 - 23 mg/dL    CREATININE 1.3 (H) 0.5 - 1.0 mg/dL    GFR Non-African American 41 >=60 mL/min/1.73    GFR African American 49     Calcium 8.6 8.6 - 10.2 mg/dL   Lactic Acid   Result Value Ref Range    Lactic Acid 7.1 (HH) 0.5 - 2.2 mmol/L   Lactic Acid   Result Value Ref Range    Lactic Acid 4.4 (HH) 0.5 - 2.2 mmol/L   Phosphorus   Result Value Ref Range    Phosphorus 2.3 (L) 2.5 - 4.5 mg/dL   Phosphorus   Result Value Ref Range    Phosphorus 2.6 2.5 - 4.5 mg/dL   Blood Gas, Arterial   Result Value Ref Range    Date Analyzed [id number]     Time Analyzed 0637     Source: Blood Arterial     pH, Blood Gas 7.506 (H) 7.350 - 7.450    PCO2 33.9 (L) 35.0 - 45.0 mmHg    PO2 130.0 (H) 75.0 - 100.0 mmHg    HCO3 26.2 (H) 22.0 - 26.0 mmol/L    B.E. 3.2 (H) -3.0 - 3.0 mmol/L    O2 Sat 98.7 (H) 92.0 - 98.5 %    PO2/FIO2 2.36 mmHg/%    O2Hb 97.9 (H) 94.0 - 97.0 %    COHb 0.5 0.0 - 1.5 %    MetHb 0.3 0.0 - 1.5 %    O2 Content 13.7 mL/dL    HHb 1.3 0.0 - 5.0 %    tHb (est) 9.8 (L) 11.5 - 16.5 g/dL    Mode AC     FIO2 55.0 %    Rr Mechanical 22.0 b/min    Vt Mechanical 420.0 mL    Peep/Cpap 8.0 cmH2O    Date Of Collection      Time Collected      Pt Temp 27.0 C     ID [id number]     Lab [id number]     Critical(s) Notified .  No Critical Values    POCT Glucose   Result Value Ref Range    Meter Glucose 320 (H) 74 - 99 mg/dL   POCT Glucose   Result Value Ref Range    Meter Glucose 323 (H) 74 - 99 mg/dL   POCT Glucose   Result Value Ref Range    Meter Glucose 186 (H) 74 - 99 mg/dL   POCT Glucose   Result Value Ref Range    Meter Glucose 159 (H) 74 - 99 mg/dL   POCT Glucose   Result Value Ref Range    Meter Glucose 284 (H) 74 - 99 mg/dL   POCT Glucose   Result Value Ref Range    Meter Glucose 271 (H) 74 - 99 mg/dL   POCT Glucose   Result Value Ref Range    Meter Glucose 236 (H) 74 - 99 mg/dL   POCT Glucose   Result Value Ref Range    Meter Glucose 269 (H) 74 - 99 mg/dL   POCT Glucose   Result Value Ref Range    Meter Glucose 174 (H) 74 - 99 mg/dL   POCT Glucose   Result Value Ref Range    Meter Glucose 152 (H) 74 - 99 mg/dL   POCT Glucose   Result Value Ref Range    Meter Glucose 140 (H) 74 - 99 mg/dL   POCT Glucose   Result Value Ref Range    Meter Glucose 145 (H) 74 - 99 mg/dL   POCT Glucose   Result Value Ref Range    Meter Glucose 151 (H) 74 - 99 mg/dL   POCT Glucose   Result Value Ref Range    Meter Glucose 126 (H) 74 - 99 mg/dL   EKG 12 Lead   Result Value Ref Range    Ventricular Rate 150 BPM    Atrial Rate 150 BPM    P-R Interval 122 ms    QRS Duration 102 ms    Q-T Interval 290 ms    QTc Calculation (Bazett) 458 ms    P Axis 66 degrees    R Axis 160 degrees    T Axis 32 degrees   EKG 12 Lead   Result Value Ref Range    Ventricular Rate 132 BPM    Atrial Rate 132 BPM    P-R Interval 120 ms    QRS Duration 100 ms    Q-T Interval 320 ms    QTc Calculation (Bazett) 474 ms    P Axis 53 degrees    R Axis 158 degrees    T Axis 41 degrees   EKG 12 Lead   Result Value Ref Range    Ventricular Rate 133 BPM    Atrial Rate 119 BPM    QRS Duration 102 ms    Q-T Interval 338 ms    QTc Calculation (Bazett) 503 ms    R Axis 104 degrees    T Axis 18 degrees       RADIOLOGY:  XR CHEST PORTABLE   Final Result   Satisfactory placement of left-sided IJ catheter         XR ABDOMEN FOR NG/OG/NE TUBE PLACEMENT   Final Result   The enteric catheter appears to be well position, its tip overlying the   gastric pylorus. XR CHEST PORTABLE   Final Result   Nasogastric intubation. Repositioning of the endotracheal tube as noted. Stable pulmonary findings. CT TIBIA FIBULA LEFT W CONTRAST   Final Result   1. Patient status post total left knee arthroplasty. 2.  Significant osteo-degenerative changes involving the left ankle, as   described above. 3.  Small calcaneal bone spurs.       4.  Significant, diffuse subcutaneous soft tissue edema throughout the   visualized left lower extremity, somewhat more prominent involving the calf   in the visualized thigh. Rule out cellulitis. CT TIBIA FIBULA RIGHT W CONTRAST   Final Result   1. Old distal right tibia and fibular fractures. 2.  Mild medial subluxation of the distal femur over the proximal tibia. 3.  Significant osteo-degenerative changes, as described above. 4.  Calcaneal bone spurs, as described above. Significant, diffuse subcutaneous soft tissue edema throughout the visualized   right lower extremity, somewhat more prominent involving the calf than the   visualized thigh. Rule out cellulitis. 5.  Suggestion of thickening of the distal aspect of the Achilles tendon. Rule out calcific tendinitis. A partial tear cannot be excluded. MRI of the   ankle can be considered for further evaluation. 6.  Irregularity suggested involving the very superficial subcutaneous soft   tissues of the distal right lower extremity, just above the ankle and medial   to the femur. This is suspicious for a large wound. Clinical correlation is   recommended. XR CHEST PORTABLE   Final Result   Right main bronchus intubation. See recommendations above. Atelectasis and   ground glass airspace disease. The findings were sent to the Radiology Results [address] at 7:25   am on 3/8/2022to be communicated to a licensed caregiver. XR TIBIA FIBULA RIGHT (2 VIEWS)   Final Result   Heart is enlarged. Depth of inspiration is limited. Findings could reflect   CHF. Significant amount of soft tissue swelling in each lower extremity. There is   severe degenerative changes at the left tibiotalar articulation with possible   fusion. Recommend ankle images. Moderately severe degenerative changes about the right knee joint. XR TIBIA FIBULA LEFT (2 VIEWS)   Final Result   Heart is enlarged. Depth of inspiration is limited. Findings could reflect   CHF.       Significant amount of soft tissue swelling in each lower extremity. There is   severe degenerative changes at the left tibiotalar articulation with possible   fusion. Recommend ankle images. Moderately severe degenerative changes about the right knee joint. XR CHEST PORTABLE   Final Result   Heart is enlarged. Depth of inspiration is limited. Findings could reflect   CHF. Significant amount of soft tissue swelling in each lower extremity. There is   severe degenerative changes at the left tibiotalar articulation with possible   fusion. Recommend ankle images. Moderately severe degenerative changes about the right knee joint. CT ABDOMEN PELVIS W IV CONTRAST Additional Contrast? None   Final Result   Retroperitoneal, iliac chain and inguinal lymphadenopathy is again seen. Retroperitoneal lymphadenopathy and right iliac chain lymphadenopathy has   increased compared to priors. Findings concerning for lymphoproliferative   process. Clinical correlation recommended. Right adrenal mass continues to increase in size, now measuring 5 cm. Findings concerning for metastatic disease or primary malignancy until proven   otherwise. Gallbladder is distended, otherwise unremarkable. While findings may be   related to NPO status, if there is clinical concern for acute cholecystitis   right upper quadrant ultrasound may be helpful for further evaluation. CTA PULMONARY W CONTRAST   Final Result   1. There is no pulmonary embolus. 2. Aneurysm dilatation of the pulmonary trunk, right and left main pulmonary   arteries consistent with pulmonary hypertension   3. Patchy bibasilar infiltrates favored to represent patchy pneumonia   4. Bilateral perihilar airspace disease favored to represent CHF   5. Chronic elevation right hemidiaphragm with adjacent compression   atelectasis.          XR CHEST PORTABLE    (Results Pending)   XR CHEST PORTABLE    (Results Pending)         ------------------------- NURSING NOTES AND VITALS REVIEWED ---------------------------  Date / Time Roomed:  3/8/2022  4:28 AM  ED Bed Assignment:  [id number]    The nursing notes within the ED encounter and vital signs as below have been reviewed.      Patient Vitals for the past 24 hrs:   BP Temp Temp src Pulse Resp SpO2   03/09/22 0600 -- -- -- 86 22 96 %   03/09/22 0500 -- -- -- 87 18 97 %   03/09/22 0400 -- 100.8 °F (38.2 °C) Bladder 91 22 95 %   03/09/22 0300 -- 101.1 °F (38.4 °C) Bladder 95 22 98 %   03/09/22 0249 -- -- -- 95 22 99 %   03/09/22 0100 -- 102 °F (38.9 °C) Bladder 100 22 98 %   03/09/22 0003 -- -- -- 103 22 100 %   03/09/22 0000 -- 101.7 °F (38.7 °C) Bladder 104 28 96 %   03/08/22 2300 -- 101.7 °F (38.7 °C) Bladder 102 22 100 %   03/08/22 2200 -- 101.3 °F (38.5 °C) Bladder 101 22 100 %   03/08/22 2100 -- 100.9 °F (38.3 °C) Bladder 101 22 100 %   03/08/22 2047 -- -- -- 101 20 99 %   03/08/22 2000 -- 101.1 °F (38.4 °C) Bladder 103 22 100 %   03/08/22 1800 -- 101.3 °F (38.5 °C) Bladder 105 22 100 %   03/08/22 1730 -- -- -- 127 22 100 %   03/08/22 1711 -- -- -- 122 15 99 %   03/08/22 1700 -- -- -- 137 21 100 %   03/08/22 1630 -- -- -- 166 28 --   03/08/22 1600 -- 102.6 °F (39.2 °C) Bladder 136 27 100 %   03/08/22 1500 -- -- -- 157 22 100 %   03/08/22 1406 -- -- -- 167 26 94 %   03/08/22 1400 (!) 126/92 102.6 °F (39.2 °C) Bladder 153 (!) 35 100 %   03/08/22 1328 (!) 82/49 102.9 °F (39.4 °C) -- 134 20 100 %   03/08/22 1317 (!) 79/46 102.9 °F (39.4 °C) -- 161 20 100 %   03/08/22 1315 (!) 78/46 102.9 °F (39.4 °C) -- 154 20 100 %   03/08/22 1300 (!) 72/43 102.6 °F (39.2 °C) -- 160 20 100 %   03/08/22 1252 (!) 89/46 102.7 °F (39.3 °C) -- 152 20 100 %   03/08/22 1245 (!) 82/43 102.6 °F (39.2 °C) -- 156 20 100 %   03/08/22 1231 (!) 84/43 102.7 °F (39.3 °C) -- 168 26 (!) 1 %   03/08/22 1230 (!) 83/43 102.6 °F (39.2 °C) -- 160 20 100 %   03/08/22 1217 -- -- -- -- -- 100 %   03/08/22 1215 (!) 74/52 102.6 °F (39.2 °C) -- 146 20 100 %   03/08/22 1208 (!) 77/46 102.6 °F (39.2 °C) -- 145 24 94 %   03/08/22 1200 (!) 91/45 102.2 °F (39 °C) -- 154 20 100 %   03/08/22 1159 (!) 91/45 102.4 °F (39.1 °C) -- 139 24 100 %   03/08/22 1157 (!) 84/42 -- -- 151 24 99 %   03/08/22 1145 (!) 58/44 102.2 °F (39 °C) -- 148 20 100 %   03/08/22 1143 (!) 62/42 102.4 °F (39.1 °C) -- 147 22 100 %   03/08/22 1137 (!) 65/41 102.2 °F (39 °C) -- 144 18 100 %   03/08/22 1130 (!) 38/27 102.2 °F (39 °C) -- 138 24 100 %   03/08/22 1115 (!) 67/39 101.8 °F (38.8 °C) -- 148 -- --   03/08/22 1100 (!) 53/42 101.8 °F (38.8 °C) -- 148 22 100 %   03/08/22 1045 (!) 59/43 -- -- 148 20 100 %   03/08/22 1030 (!) 75/46 -- -- 160 20 100 %   03/08/22 1015 (!) 60/39 -- -- 150 -- --   03/08/22 1000 (!) 70/40 -- -- 118 20 100 %   03/08/22 0945 (!) 72/40 -- -- 118 20 100 %   03/08/22 0930 (!) 71/43 -- -- 120 20 100 %   03/08/22 0915 (!) 79/44 -- -- 127 22 100 %   03/08/22 0900 (!) 92/48 -- -- 130 20 100 %   03/08/22 0845 (!) 91/53 -- -- 136 20 100 %   03/08/22 0838 -- -- -- -- -- 100 %   03/08/22 0830 (!) 76/41 -- -- 128 20 100 %   03/08/22 0805 (!) 142/61 -- -- 98 18 97 %   03/08/22 0804 (!) 114/51 -- -- 102 18 (!) 89 %   03/08/22 0802 132/66 -- -- 155 14 (!) 89 %   03/08/22 0715 122/60 -- -- 146 18 97 %       Oxygen Saturation Interpretation: Initially normal on baseline 6 L NC O2; later became abnormal, improved after BVM and subsequent endotracheal intubation.    ------------------------------------------ PROGRESS NOTES ------------------------------------------  Re-evaluation(s):  Please see ED course      --------------------------------- ADDITIONAL PROVIDER NOTES ---------------------------------  Consultations:  Please see ED course    This patient's ED course included: a personal history and physicial examination, re-evaluation prior to disposition, multiple bedside re-evaluations, IV medications, cardiac monitoring, continuous pulse oximetry and complex medical decision making and emergency management    This patient has remained hemodynamically stable during their ED course. Diagnosis:  1. Sepsis, due to unspecified organism, unspecified whether acute organ dysfunction present ([facility] [address]    2. Hyperglycemia    3. Acute respiratory failure with hypoxia and hypercapnia (HCC)    4. Pneumonia of right lung due to infectious organism, unspecified part of lung    5. Septic shock ([facility] [address]    6. Bilateral lower leg cellulitis        Disposition:  Patient's disposition: Admit to CCU/ICU  Patient's condition is critical.      Please note that the withdrawal or failure to initiate urgent interventions for this patient would likely result in a life threatening deterioration or permanent disability. Accordingly this patient received 40 minutes of critical care time, excluding separately billable procedures.          Edison Linares DO  Resident  03/09/22 [id number]

## 2022-03-08 NOTE — ED NOTES
Radiology Procedure Waiver   Name: Parmjit Castro  : 1955  MRN: [id number]    Date:  3/8/22    Time: 8:16 AM EST    Benefits of immediately proceeding with radiology exam(s) without pre-testing outweigh the risks or are not indicated as specified below and therefore the following is/are being waived:    [x] Benefits of immediate radiology exam(s) outweigh any risk. OR    Pre-exam testing is not indicated for the following reason(s):  [] Pregnancy test   [] Patients LMP on-time and regular.   [] Patient had Tubal Ligation or has other Contraception Device. [] Patient  is Menopausal or Premenarcheal.    [] Patient had Full or Partial Hysterectomy. [] Protocol for CT contrast allegry   [] Patient has tolerated well previously   [] Patient does not have a true allergy    [] MRI Questionnaire     [x] BUN/Creatinine   [] Patient age w/no hx of renal dysfunction. [] Patient on Dialysis. [x] Recent Normal Labs.   Electronically signed by Sravani Cobian DO on 3/8/22 at 8:16 AM 1206 [facility],   Resident  [phone]

## 2022-03-08 NOTE — CARE COORDINATION
Social Work/Transition of Care:    Pt is from [facility],  SW spoke with Onur Munroe liaison for the facility pt is a bedhold but can not return to the facility if she remains on the vent, Liaison will follow.     Electronically signed by Pipo Mullen on 4/4/5130 at 3:16 PM

## 2022-03-08 NOTE — CONSULTS
[address] [place] [address] [facility]  Consultation Note     Admit Date: 3/8/2022  4:28 AM    Reason for Consult:   Bilateral cellulitis. Temperature of 102.9. Tachycardic. Hypotensive. Pneumonia     Attending Physician:  Kenton Guerrero MD    HISTORY OF PRESENT ILLNESS:             The history is obtained from extensive review of available past medical records. The patient is a 74 y.o. female who is previously known to the ID service. The patient presents to the ED at [facility] on 3/8/2022 complaining of cough and hyperglycemia together with nausea. The patient developed respiratory failure with agonal breathing in the radiology suite and had to be intubated. She was tachycardic. She was febrile with a temperature of 103.1 °F on presentation. Later on her blood pressure dropped. She is being admitted to the ICU and is on pressors. The patient received Cefepime, Vancomycin and Clindamycin in the ED. Past Medical History:        Diagnosis Date    (HFpEF) heart failure with preserved ejection fraction ([facility] [address]     1/16/2018- echo- LVEF 55-65%    Anal fissure     Anemia 10/6/2017    Anxiety and depression     Arthritis     Asthma     Atrial fibrillation (HCC)     Eliquis    Blood transfusion     long time no reaction    CHF (congestive heart failure) (HCC)     Diastolic    COPD (chronic obstructive pulmonary disease) (HCC)     Disc disorder     DM2 (diabetes mellitus, type 2) ([facility] [address]     on insulin    Fall due to stumbling 10/6/2017    GERD (gastroesophageal reflux disease)     History of blood transfusion     Hx of blood clots     Hyperlipidemia     Hypertension     Inappropriate sinus tachycardia     LVH (left ventricular hypertrophy)     moderate    Lymphadenitis, chronic 4/13/2018    Occipital neuralgia 11/2/2011    Respiratory acidosis 10/7/2017    Sinus tachycardia 2/16/2015 August 2021.   Admitted to [facility] with cellulitis of the left leg, nausea, vomiting and shortness of breath. She was treated with Meropenem. Seen by Dr. Wendy Negron for bacteremia. Cultures were identified as Schewanella. She was discharged on Meropenem. July 2021. Admitted to [facility] with bilateral lower extremity wounds, pain and drainage. Treated with Meropenem and Vancomycin. Seen by Dr. Wendy Negron. Wound cultures grew mixed orin, including Proteus, Corynebacterium and MRSA. Antibiotic were changed over to oral Linezolid. May 2021. Admitted to [facility] with fevers, chills or shortness of breath. Blood cultures turned positive for E. coli. Seen by Dr. Wendy Negron and treated with Ceftriaxone. This was changed over to Ertapenem for ESBL + E. coli. April 2021. Admitted to [facility] with cellulitis of the left leg. Treated with Vancomycin, Cefepime and Zosyn. Seen by Dr. Chauncey Spring. Treated with Zosyn and Linezolid. She was discharged on oral Levofloxacin and Doxycycline. May 2020. Admitted to [facility] with a gluteal abscess. Treated with Vancomycin and Zosyn. Seen by Dr. Manoj Bello. Both antibiotics were continued. December 2019. Admitted to [facility] with bilateral lower extremity lymphedema and concern for cellulitis. Seen by Dr. Anastacia Griffith. Treated with doxycycline and Cephalexin. September 2019. Admitted to [facility] with left lower extremity cellulitis. Seen by Dr. Benjamín Brand and treated with Cefazolin and oral Doxycycline. She was discharged the next day on Doxycycline and Cephalexin. December 2018. Admitted to [facility] with shortness of breath after eating sausages. Treated with Meropenem for previous infection with cellulitis of left lower extremity. Meropenem was changed over to Doxycycline and Cephalexin. 11/27/18. Admitted to [facility] with worsening left lower extremity lymphedema. Seen by Dr. Benjamín Brand and treated for cellulitis with Vancomycin, Cefepime. Also given Terbinafine for onychomycosis.  Wound cultures showed ESBL + E. coli, Streptococcus agalactiae and Pseudomonas. Antibiotics were consolidated to Meropenem and the patient was discharged to nursing facility. Past Surgical History:        Procedure Laterality Date    ANOSCOPY  10/25/2006    injection of anal sphincter with Botox, Franklyn Ortiz/Tmimy, [facility]    ANOSCOPY  4/9/2007    injection of anal sphincter with Botox, Dr. Elio Kauffman, [address] [place] [address] ANOSCOPY  6/13/2007    injection of anal sphincter with Botox, Franklyn Mosquera [facility]    ANUS SURGERY  4/4/2006    Lateral sphincterotomy for chronic anal fissure, Dr. Eric Howell, [facility]  4/18/2012    anal exam and injection of Botox 100 units into anal sphincter, Laila Mosquera, [facility]    APPENDECTOMY  1965   [address]    COLONOSCOPY  1/20/2004    cecal polyp, bx (no pathologic changes), Dr. Gregorio Brown, [facility] COLONOSCOPY  8/11/2006    snare polypectomy terminal ileum polyp (granulation tissue polyp) and anal polyp (inflammatory/papilla), Dr. Elio Kauffman, [address] [place] [address] COLONOSCOPY  3/23/2012    bx/cauterization proximal ascending colon polyp, injection of anal sphincter with Botox, Dr. Elio Kauffman, [facility]    ENDOSCOPY, COLON, DIAGNOSTIC      FRACTURE SURGERY      R leg fracture with surgery for repair    JOINT REPLACEMENT  2003    L knee    NH DEBRIDEMENT, SKIN, SUB-Q TISSUE,MUSCLE,=<20 SQ CM Left 11/30/2018    LEFT LEG EXCISIONAL  DEBRIDEMENT WITH TISSUE BIOPSY [facility] [address] [facility]  1/20/2004    gastritis, bx (no H pylori), Dr. Gregorio Brown, [address] ENDOSCOPY N/A 6/1/2018    EGD BIOPSY performed by Veronica Wilson MD at [address] [place] [address],Third Floor 11/9/2018    EGD BIOPSY performed by Veronica Wilson MD at [facility] ENDOSCOPY     Current Medications:   Scheduled Meds:   vancomycin  20 mg/kg IntraVENous Once Continuous Infusions:   propofol      fentaNYL 5 mcg/ml in 0.9%  ml infusion 100 mcg/hr (22)    norepinephrine 21 mcg/min (22)    vasopressin (Septic Shock) infusion 0.05 Units/min (22)     PRN Meds:    Allergies:  Statins    Social History:   Social History     Socioeconomic History    Marital status: Single     Spouse name: None    Number of children: 3    Years of education: 9    Highest education level: 9th grade   Occupational History    Occupation: [employer]   Tobacco Use    Smoking status: Former Smoker     Packs/day: 1.50     Years: 25.00     Pack years: 37.50     Types: Cigarettes     Start date: 1979     Quit date: 2004     Years since quittin.2    Smokeless tobacco: Never Used    Tobacco comment: Stopped smoking 16 years ago   Vaping Use    Vaping Use: Never used   Substance and Sexual Activity    Alcohol use: Not Currently     Comment: 1 can coke daily    Drug use: Not Currently    Sexual activity: Not Currently     Partners: Male   Other Topics Concern    None   Social History Narrative    Denies caffeine. Grandson shops for her and helps around the house    Patient unable to exercise d/t mobility status     Patient lives with her  who has limited speech ability d/t hx of CVA. He is able to assist patient with IADLS     Social Determinants of Health     Financial Resource Strain:     Difficulty of Paying Living Expenses: Not on file   Food Insecurity:     Worried About Running Out of Food in the Last Year: Not on file    [address] [place] [address] Place of Food in the Last Year: Not on file   Transportation Needs:     Lack of Transportation (Medical): Not on file    Lack of Transportation (Non-Medical):  Not on file   Physical Activity:     Days of Exercise per Week: Not on file    Minutes of Exercise per Session: Not on file   Stress:     Feeling of Stress : Not on file   Social Connections:     Frequency of Communication with Friends and Family: Not on file    Frequency of Social Gatherings with Friends and Family: Not on file    Attends Zoroastrianism Services: Not on file    Active Member of Clubs or Organizations: Not on file    Attends Club or Organization Meetings: Not on file    Marital Status: Not on file   Intimate Partner Violence:     Fear of Current or Ex-Partner: Not on file    Emotionally Abused: Not on file    Physically Abused: Not on file    Sexually Abused: Not on file   Housing Stability:     Unable to Pay for Housing in the Last Year: Not on file    Number of [place] in the Last Year: Not on file    Unstable Housing in the Last Year: Not on file      Family History:       Problem Relation Age of Onset    Heart Disease Mother     Diabetes Father     Colon Cancer Father     Other Father         BLINDNESS    Colon Cancer Sister     Other Sister         shingles- has had over 1 year    Diabetes Paternal Aunt     Diabetes Paternal Uncle     Diabetes Paternal Aunt     Diabetes Paternal Uncle     Diabetes Sister    . Otherwise non-pertinent to the chief complaint. REVIEW OF SYSTEMS:    A 10 point review of system cannot be obtained with the patient given her status of intubation. Otherwise, as in the HPI    PHYSICAL EXAM:    Vitals:   BP (!) 82/49   Pulse 134   Temp 102.9 °F (39.4 °C)   Resp 20   Ht 5' 4\" (1.626 m)   Wt 298 lb (135.2 kg)   LMP  (LMP Unknown)   SpO2 100%   BMI 51.15 kg/m²   Constitutional: The patient is lying in bed in the ICU. She is intubated and sedated. She is morbidly obese. FiO2 100%. PEEP 8. Skin: Warm and dry. No rashes were noted. HEENT: Eyes show round, and reactive pupils. No jaundice. Moist mucous membranes, no ulcerations, no thrush. ET tube. OG tube. Neck: Supple to movements. No lymphadenopathy. Chest: No use of accessory muscles to breathe. Symmetrical expansion. Auscultation reveals no wheezing, crackles, or rhonchi.    Cardiovascular: S1 and S2 are rhythmic and regular. No murmurs appreciated. Abdomen: Positive bowel sounds to auscultation. Benign to palpation. No masses felt. No hepatosplenomegaly. Intertrigo in inguinal area. Extremities: Bilateral lower extremity lymphedema with lymphoceles. No erythema. No open wounds or drainage. Crepitus right knee but no effusion. Left knee TKR. Lines: Peripheral.  Hansen catheter.     CBC+dif:  Recent Labs     03/08/22  0610   WBC 7.3   HGB 10.0*   HCT 34.5   MCV 86.9      NEUTROABS 6.61     Lab Results   Component Value Date    CRP 5.3 (H) 07/05/2021    CRP 14.0 (H) 05/29/2020    CRP 2.2 (H) 12/24/2019      No results found for: CRPHS  Lab Results   Component Value Date    SEDRATE 41 (H) 07/05/2021    SEDRATE 75 (H) 05/29/2020    SEDRATE 41 (H) 12/24/2019     Lab Results   Component Value Date    ALT 19 03/08/2022    AST 34 (H) 03/08/2022    ALKPHOS 103 03/08/2022    BILITOT 0.3 03/08/2022     Lab Results   Component Value Date     03/08/2022    K 3.8 03/08/2022    K 5.7 03/08/2022    CL 96 03/08/2022    CO2 31 03/08/2022    BUN 31 03/08/2022    CREATININE 0.9 03/08/2022    GFRAA >60 03/08/2022    LABGLOM >60 03/08/2022    GLUCOSE 358 03/08/2022    GLUCOSE 181 12/16/2011    PROT 7.2 03/08/2022    LABALBU 3.8 03/08/2022    LABALBU 4.5 11/02/2011    CALCIUM 9.2 03/08/2022    BILITOT 0.3 03/08/2022    ALKPHOS 103 03/08/2022    AST 34 03/08/2022    ALT 19 03/08/2022       Lab Results   Component Value Date    PROTIME 19.5 04/29/2021    INR 1.8 04/29/2021       Lab Results   Component Value Date    TSH 1.890 08/18/2021       Lab Results   Component Value Date    NITRITE negative 07/26/2018    COLORU DARK YELLOW 09/01/2021    PHUR 6.0 09/01/2021    WBCUA NONE 08/18/2021    RBCUA 1-3 08/18/2021    YEAST Present 05/28/2020    BACTERIA MANY 08/18/2021    CLARITYU Clear 09/01/2021    SPECGRAV 1.015 09/01/2021    LEUKOCYTESUR Negative 09/01/2021    UROBILINOGEN 0.2 09/01/2021    BILIRUBINUR Negative 09/01/2021 BILIRUBINUR negative 07/26/2018    BLOODU Negative 09/01/2021    GLUCOSEU Negative 09/01/2021    AMORPHOUS FEW 08/14/2020       Lab Results   Component Value Date    HCO3 32.3 03/08/2022    BE 4.3 03/08/2022    O2SAT 85.4 03/08/2022    PH 7.278 03/08/2022    PCO2 70.6 03/08/2022    PO2 52.8 03/08/2022     Radiology:  CT reviewed    Microbiology:  Pending  No results for input(s): BC in the last 72 hours. No results for input(s): ORG in the last 72 hours. No results for input(s): Berneice Blue in the last 72 hours. No results for input(s): STREPNEUMAGU in the last 72 hours. No results for input(s): LP1UAG in the last 72 hours. No results for input(s): ASO in the last 72 hours. No results for input(s): CULTRESP in the last 72 hours. No results for input(s): PROCAL in the last 72 hours. Assessment:  · Sepsis with septic shock  · Possible complicated UTI with sepsis  · Possible right lower lobe pneumonia  · Fever associated to the above  · Bilateral lower extremity chronic lymphedema    Plan:    · Start Meropenem  · Check cultures, baseline ESR, CRP, ASO titer  · Monitor labs  · Will follow with you    Thank you for having us see this patient in consultation. I will be discussing this case with the treating physicians.   Sp discussed with flavio with ICU team.    Eliane Gonsales MD  1:58 PM  3/8/2022

## 2022-03-08 NOTE — CONSULTS
Critical Care Admit/Consult Note         Patient - Corrina Fallon   MRN -  [id number]   Acct # - [de-identified]   - 1955      Date of Admission -  3/8/2022  4:28 AM  Date of evaluation -  3/8/2022  0217/021-A   Hospital Day - 0            ADMIT/CONSULT DETAILS     Reason for Admit/Consult   Sepsis     Consulting Chivo Mckenzie MD  Primary Care Physician - Darnell Jacinto, DO         HPI   The patient is a 74 y.o. female with significant past medical history of Paroxysmal Afib, HLD, Pulmonary Hypertension, diabetic neuropathy, Chroninc diastolic heart failure, Lymphedema and cellulitis of both lower extremities, Chronic Anemia, Uncontrolled diabetes mellitus, HTN, Chronic Anal Fissure, PFO, chronic DVT of right lower extremity, GI bleed, Adrenal mass, Stress Fracture of Right Fibula, presenting to the intensive care unit in septic shock. Patient presented to the ED for evaluation of cough, hyperglycemia, chills, nausea, abdominal pain. Patient on Cardizem to 40 mg p.o. twice daily, chronic DVT of right lower extremity, 5 mg Eliquis twice daily. Patient is on 6 L nasal cannula oxygen at baseline. Patient has associated diarrhea, swelling and pain in bilateral lower extremities. Patient reports acute worsening of swelling and pain in bilateral lower extremities to ER staff. EMS reported blood glucose was 442Patient was found to be tachycardic in the 140s, with initial rhythm sinus on EKG. 1 L fluid given. Echo from 2021 showed EF of 60 to 65%. While patient was not x-ray, was found to be agonal breathing, unresponsive with cyanotic appearing on home oxygen of 6 L by ER staff. Dr. Leigh Joseph bag-valve-mask ventilation with rapid sequence intubation utilizing propofol. ER staff noted that right lower extremity displayed increased erythema/discoloration from previous in the encounter. ET tube was found to be 25 cm, pulled back to 22 cm.   Magnesium found to be 1.4, 4 mg of IV magnesium ordered. Patient started on IV clindamycin. CTA demonstrated no pulmonary embolism with dilatation of the pulmonary trunk and arteries consistent with pulmonary hypertension. Also demonstrates patchy bibasilar infiltrates representing possible pneumonia. Increasing right adrenal mass measuring 5 cm from previous. While in the ER patient was noted to have tachycardia within the 160s to 170s, with mean arterial pressure in the 50s. Patient was started on Levophed, vasopressin was added on due to continued hypotension. Patient started on Solu-Cortef. Patient was started on vancomycin, cefepime, clindamycin. Patient noted to have A. fib in the ICU, started on amiodarone. Infectious disease consulted, meropenem substituted. Past Medical History         Diagnosis Date    (HFpEF) heart failure with preserved ejection fraction ([facility]     1/16/2018- echo- LVEF 55-65%    Anal fissure     Anemia 10/6/2017    Anxiety and depression     Arthritis     Asthma     Atrial fibrillation (HCC)     Eliquis    Blood transfusion     long time no reaction    CHF (congestive heart failure) (HCC)     Diastolic    COPD (chronic obstructive pulmonary disease) (HCC)     Disc disorder     DM2 (diabetes mellitus, type 2) ([facility]     on insulin    Fall due to stumbling 10/6/2017    GERD (gastroesophageal reflux disease)     History of blood transfusion     Hx of blood clots     Hyperlipidemia     Hypertension     Inappropriate sinus tachycardia     LVH (left ventricular hypertrophy)     moderate    Lymphadenitis, chronic 4/13/2018    Occipital neuralgia 11/2/2011    Respiratory acidosis 10/7/2017    Sinus tachycardia 2/16/2015        Past Surgical History           Procedure Laterality Date    ANOSCOPY  10/25/2006    injection of anal sphincter with Botox, Franklyn Ortiz/Timmy, [facility]    ANOSCOPY  4/9/2007    injection of anal sphincter with Botox, Dr. Spencer Saxena, [address] [place] [address] ANOSCOPY  6/13/2007    injection of anal sphincter with Botox, Franklyn Yu [facility]    ANUS SURGERY  4/4/2006    Lateral sphincterotomy for chronic anal fissure, Tammi Meraz  4/18/2012    anal exam and injection of Botox 100 units into anal sphincter, Laila Yu, [facility]    APPENDECTOMY  1965   [address]    COLONOSCOPY  1/20/2004    cecal polyp, bx (no pathologic changes), Dr. Tyron Partida, [facility] COLONOSCOPY  8/11/2006    snare polypectomy terminal ileum polyp (granulation tissue polyp) and anal polyp (inflammatory/papilla), Dr. Ronald Beyer, [facility] COLONOSCOPY  3/23/2012    bx/cauterization proximal ascending colon polyp, injection of anal sphincter with Botox, Dr. Ronald Beyer, [facility]    ENDOSCOPY, COLON, DIAGNOSTIC      FRACTURE SURGERY      R leg fracture with surgery for repair    JOINT REPLACEMENT  2003    L knee    MS DEBRIDEMENT, SKIN, SUB-Q TISSUE,MUSCLE,=<20 SQ CM Left 11/30/2018    LEFT LEG EXCISIONAL  DEBRIDEMENT WITH TISSUE BIOPSY performed by Vanessa Edouard DPM at [address] ENDOSCOPY  1/20/2004    gastritis, bx (no H pylori), Dr. Tyron Partida, [address] ENDOSCOPY N/A 6/1/2018    EGD BIOPSY performed by Ever Velasquez MD at [address] [place] [address] 11/9/2018    EGD BIOPSY performed by Ever Velasquez MD at \[facility]\"" 96:  The patient is a Past smoker of 25. Pack year equal 37.5.                   Current Medications   Current Medications    vancomycin  20 mg/kg IntraVENous Once    chlorhexidine  15 mL Mouth/Throat BID    pantoprazole  40 mg IntraVENous Daily    lactated ringers bolus  1,000 mL IntraVENous Once    amiodarone  150 mg IntraVENous Once    hydrocortisone sodium succinate PF  100 mg IntraVENous Q8H    meropenem  1,000 mg IntraVENous Q8H       IV Drips/Infusions   fentaNYL 5 mcg/ml in 0.9%  ml infusion 100 mcg/hr (03/08/22 1415)    norepinephrine 21 mcg/min (03/08/22 1414)    vasopressin (Septic Shock) infusion 0.04 Units/min (03/08/22 1415)    amiodarone      Followed by   Fernanda Sagastume amiodarone       Home Medications  Medications Prior to Admission: docusate sodium (COLACE) 100 MG capsule, Take 100 mg by mouth nightly  DULoxetine (CYMBALTA) 30 MG extended release capsule, Take 30 mg by mouth every morning *SEE OTHER ORDER*  DULoxetine (CYMBALTA) 60 MG extended release capsule, Take 60 mg by mouth nightly *SEE OTHER ORDER*  dilTIAZem (DILACOR XR) 240 MG extended release capsule, Take 240 mg by mouth 2 times daily Indications: -HOLD FOR SBP<100 OR HR <60-  insulin lispro (HUMALOG) 100 UNIT/ML injection vial, Inject 5 Units into the skin once **DUE TO BS OF  512**  insulin lispro (HUMALOG) 100 UNIT/ML injection vial, Inject 35 Units into the skin 3 times daily (with meals) *PLUS SLIDING SCALE*  insulin lispro (HUMALOG) 100 UNIT/ML injection vial, Inject 0-15 Units into the skin 3 times daily (with meals) PER SLIDING SCALE: 0-149=0U, 150-200=3U, 201-250=6U, 251-300=9U, 301-50=12U, 351+=15U & CALL DR. Gladis Kiran ([phone]) *SEE OTHER ORDERS*  insulin lispro (HUMALOG) 100 UNIT/ML injection vial, Inject 0-5 Units into the skin nightly PER SLIDING SCALE: 0-199=0U, 200-250=2U, 251-300=3U, 301-350=4U, 351+=5U *SEE OTHER ORDERS*  hydrOXYzine (ATARAX) 50 MG tablet, Take 50 mg by mouth 3 times daily  insulin glargine (LANTUS) 100 UNIT/ML injection vial, Inject 49 Units into the skin every morning *SEE OTHER ORDER*  Melatonin 10 MG TABS, Take 10 mg by mouth nightly  promethazine (PHENERGAN) 25 MG/ML injection, Inject 25 mg into the muscle once  ipratropium (ATROVENT) 0.02 % nebulizer solution, Take 0.5 mg by nebulization 3 times daily *SEE OTHER ORDER*  ipratropium (ATROVENT) 0.02 % nebulizer solution, Take 0.5 mg by nebulization every 3 hours as needed for Wheezing *SEE OTHER ORDER*  bisacodyl (DULCOLAX) 10 MG suppository, Place 10 mg rectally daily as needed for Constipation  ferrous sulfate (IRON 325) 325 (65 Fe) MG tablet, Take 325 mg by mouth 2 times daily  magnesium hydroxide (MILK OF MAGNESIA) 400 MG/5ML suspension, Take 30 mLs by mouth daily as needed for Constipation  Multiple Vitamins-Minerals (THERAPEUTIC MULTIVITAMIN-MINERALS) tablet, Take 1 tablet by mouth daily  omeprazole (PRILOSEC) 40 MG delayed release capsule, Take 40 mg by mouth Daily   ezetimibe (ZETIA) 10 MG tablet, Take 1 tablet by mouth nightly  metoprolol succinate (TOPROL XL) 100 MG extended release tablet, Take 1 tablet by mouth 2 times daily  insulin glargine (LANTUS SOLOSTAR) 100 UNIT/ML injection pen, Inject 52 Units into the skin nightly *SEE OTHER ORDER*  miconazole (MICOTIN) 2 % powder, Apply topically 2 times daily -BREAST & ABD FOLDS-  mineral oil-hydrophilic petrolatum (AQUAPHOR) ointment, Apply topically every 8 hours as needed (DRY SKIN TO THE ARMS & LEGS)   acetaminophen (TYLENOL) 500 MG tablet, Take 1,000 mg by mouth every 6 hours as needed for Pain Indications: -NTE: 3G/24HRS-   apixaban (ELIQUIS) 5 MG TABS tablet, Take 5 mg by mouth 2 times daily  bumetanide (BUMEX) 2 MG tablet, Take 2 mg by mouth 2 times daily  gabapentin (NEURONTIN) 600 MG tablet, Take 600 mg by mouth 3 times daily. vitamin D (ERGOCALCIFEROL) 1.25 MG (04229 UT) CAPS capsule, Take 50,000 Units by mouth once a week *MONDAYS*  omega-3 acid ethyl esters (LOVAZA) 1 g capsule, Take 2 capsules by mouth 2 times daily  spironolactone (ALDACTONE) 25 MG tablet, Take 1 tablet by mouth daily    Diet/Nutrition   No diet orders on file    Allergies   Statins    Social History   Tobacco   reports that she quit smoking about 17 years ago. Her smoking use included cigarettes. She started smoking about 42 years ago. She has a 37.50 pack-year smoking history. She has never used smokeless tobacco.    Alcohol     reports previous alcohol use.     Occupational history :    Family History         Problem Relation Age of Onset    Heart Disease Mother     Diabetes Father     Colon Cancer Father     Other Father         BLINDNESS    Colon Cancer Sister     Other Sister         shingles- has had over 1 year    Diabetes Paternal Aunt     Diabetes Paternal Uncle     Diabetes Paternal Aunt     Diabetes Paternal Uncle     Diabetes Sister        Sleep History   n/a    ROS     REVIEW OF SYSTEMS:  Review of systems not obtained due to patient factors - intubation and mental status    Lines and Devices   Left internal jugular central venous catheter, 2 left peripheral IVs on forearm and wrist, Arterial line on the right arterial     Mechanical Ventilation Data   VENT SETTINGS (Comprehensive)  Vent Information  $Ventilation: $Initial Day  Vent Type: 980  Vent Mode: AC/VC  Vt Ordered: 420 mL  Rate Set: 26 bmp  Peak Flow: 60 L/min  Pressure Support: 0 cmH20  FiO2 : 100 %  SpO2: 94 %  SpO2/FiO2 ratio: 94  Sensitivity: 3  PEEP/CPAP: 8  I Time/ I Time %: 0 s  Humidification Source: Heated wire  Humidification Temp: 37  Humidification Temp Measured: 37  Additional Respiratory  Assessments  Pulse: 167  Resp: 26  SpO2: 94 %  Humidification Source: Heated wire  Humidification Temp: 37    ABG  Lab Results   Component Value Date    PH 7.278 2022    PCO2 70.6 2022    PO2 52.8 2022    HCO3 32.3 2022    O2SAT 85.4 2022     Lab Results   Component Value Date    MODE AC 2022           Vitals    height is 5' 4\" (1.626 m) and weight is 298 lb (135.2 kg). Her temperature is 102.9 °F (39.4 °C). Her blood pressure is 82/49 (abnormal) and her pulse is 167. Her respiration is 26 and oxygen saturation is 94%.        Temperature Range: Temp: 102.9 °F (39.4 °C) Temp  Av.5 °F (39.2 °C)  Min: 101.8 °F (38.8 °C)  Max: 103.1 °F (39.5 °C)  BP Range:  Systolic (60FAU), EAI:58 , Min:38 , NLP:385     Diastolic (81OQW), LC, Min:27, Max:80    Pulse Range: Pulse  Av  Min: 98  Max: 168  Respiration Range: Resp  Av.5  Min: 14  Max: 26  Current Pulse Ox[de-identified]  SpO2: 94 %  24HR Pulse Ox Range:  SpO2  Av.8 %  Min: 1 %  Max: 100 %  Oxygen Amount and Delivery: O2 Flow Rate (L/min): 6 L/min      I/O (24 Hours)    Patient Vitals for the past 8 hrs:   BP Temp Pulse Resp SpO2   22 1406 -- -- 167 26 94 %   22 1328 (!) 82/49 102.9 °F (39.4 °C) 134 20 100 %   22 1317 (!) 79/46 102.9 °F (39.4 °C) 161 20 100 %   22 1315 (!) 78/46 102.9 °F (39.4 °C) 154 20 100 %   22 1300 (!) 72/43 102.6 °F (39.2 °C) 160 20 100 %   22 1252 (!) 89/46 102.7 °F (39.3 °C) 152 20 100 %   22 1245 (!) 82/43 102.6 °F (39.2 °C) 156 20 100 %   22 1231 (!) 84/43 102.7 °F (39.3 °C) 168 26 (!) 1 %   22 1230 (!) 83/43 102.6 °F (39.2 °C) 160 20 100 %   22 1217 -- -- -- -- 100 %   22 1215 (!) 74/52 102.6 °F (39.2 °C) 146 20 100 %   22 1208 (!) 77/46 102.6 °F (39.2 °C) 145 24 94 %   22 1200 (!) 91/45 102.2 °F (39 °C) 154 20 100 %   22 1159 (!) 91/45 102.4 °F (39.1 °C) 139 24 100 %   22 1157 (!) 84/42 -- 151 24 99 %   22 1145 (!) 58/44 102.2 °F (39 °C) 148 20 100 %   22 1143 (!) 62/42 102.4 °F (39.1 °C) 147 22 100 %   22 1137 (!) 65/41 102.2 °F (39 °C) 144 18 100 %   22 1130 (!) 38/27 102.2 °F (39 °C) 138 24 100 %   22 1115 (!) 67/39 101.8 °F (38.8 °C) 148 -- --   22 1100 (!) 53/42 101.8 °F (38.8 °C) 148 22 100 %   22 1045 (!) 59/43 -- 148 20 100 %   22 1030 (!) 75/46 -- 160 20 100 %   22 1015 (!) 60/39 -- 150 -- --   22 1000 (!) 70/40 -- 118 20 100 %   22 0945 (!) 72/40 -- 118 20 100 %   22 0930 (!) 71/43 -- 120 20 100 %   22 0915 (!) 79/44 -- 127 22 100 %   22 0900 (!) 92/48 -- 130 20 100 %   22 0845 (!) 91/53 -- 136 20 100 %   22 0838 -- -- -- -- 100 %   22 0830 (!) 76/41 -- 128 20 100 %   22 0805 (!) 142/61 -- 98 18 97 %   22 0804 (!) 114/51 -- 102 18 (!) 89 %   03/08/22 0802 132/66 -- 155 14 (!) 89 %   03/08/22 0715 122/60 -- 146 18 97 %       Intake/Output Summary (Last 24 hours) at 3/8/2022 1423  Last data filed at 3/8/2022 1410  Gross per 24 hour   Intake 2739.61 ml   Output 50 ml   Net 2689.61 ml     I/O last 3 completed shifts: In: 1000 [IV Piggyback:1000]  Out: -    Date 03/08/22 0000 - 03/08/22 2359   Shift 9785-0256 8514-2905 2059-9313 24 Hour Total   INTAKE   I.V.(mL/kg)  125.1(0.9)  125.1(0.9)   IV Piggyback(mL/kg) 1000(7.4) 1614. 5(11.9)  9336.7(68.8)   Shift Total(mL/kg) 1000(7.4) 1739. 6(12.9)  2739. 6(20.3)   OUTPUT   Urine(mL/kg/hr)  50  50   Shift Total(mL/kg)  50(0.4)  50(0.4)   Weight (kg) 135.2 135.2 135.2 135.2     Patient Vitals for the past 96 hrs (Last 3 readings):   Weight   03/08/22 0435 298 lb (135.2 kg)         Drains/Tubes Outputs    Exam         PHYSICAL EXAM:  CONSTITUTIONAL:  Sedated and morbidly obese  EYES:  Lids and lashes normal, pupils equal, round and reactive to light, extra ocular muscles intact, sclera clear, conjunctiva normal  ENT:  normocepalic, without obvious abnormality  NECK:  supple, symmetrical, trachea midline  LUNGS:  tachypneic, decreased air exchange and Mild retraction  CARDIOVASCULAR:  irregularly irregular rhythm and  pulses 2+ bilaterally  ABDOMEN:  Non distended, mild diffuse tenderness, no rebound  MSK: Moves all hands and limbs  NEUROLOGIC:  Sedated and intubated, reacts to touch  SKIN:  Erythema and diffuse skin changes in bilateral lower extremities    Data   Old records and images have been reviewed    Lab Results   CBC     Lab Results   Component Value Date    WBC 7.3 03/08/2022    RBC 3.97 03/08/2022    HGB 10.0 03/08/2022    HCT 34.5 03/08/2022     03/08/2022    MCV 86.9 03/08/2022    MCH 25.2 03/08/2022    MCHC 29.0 03/08/2022    RDW 14.6 03/08/2022    NRBC 1.7 08/23/2021    SEGSPCT 85 01/29/2014    METASPCT 3.5 08/23/2021    LYMPHOPCT 6.1 03/08/2022    PROMYELOPCT 2.6 08/21/2021    MONOPCT 1.7 03/08/2022    MYELOPCT 1.7 08/23/2021    BASOPCT 0.1 03/08/2022    MONOSABS 0.12 03/08/2022    LYMPHSABS 0.44 03/08/2022    EOSABS 0.04 03/08/2022    BASOSABS 0.01 03/08/2022       BMP   Lab Results   Component Value Date     03/08/2022    K 3.8 03/08/2022    K 5.7 03/08/2022    CL 96 03/08/2022    CO2 31 03/08/2022    BUN 31 03/08/2022    CREATININE 0.9 03/08/2022    GLUCOSE 358 03/08/2022    GLUCOSE 181 12/16/2011    CALCIUM 9.2 03/08/2022       LFTS  Lab Results   Component Value Date    ALKPHOS 103 03/08/2022    ALT 19 03/08/2022    AST 34 03/08/2022    PROT 7.2 03/08/2022    BILITOT 0.3 03/08/2022    BILIDIR <0.2 12/26/2019    IBILI see below 12/26/2019    LABALBU 3.8 03/08/2022    LABALBU 4.5 11/02/2011       INR  No results for input(s): PROTIME, INR in the last 72 hours. APTT  No results for input(s): APTT in the last 72 hours. Lactic Acid  Lab Results   Component Value Date    LACTA 1.5 07/05/2021    LACTA 2.8 04/29/2021    LACTA 3.4 01/15/2021        BNP   No results for input(s): BNP in the last 72 hours. Cultures     No results for input(s): BC in the last 72 hours. No results for input(s): Arabella Denilson in the last 72 hours. No results for input(s): LABURIN in the last 72 hours. Radiology   CXR  See radiology report      CT Scans  See radiology report    SYSTEMS ASSESSMENT    Neuro   Intubated on 3/8/2022  On Versed Drip     Respiratory   Intubated   Vent R22, Vt 420,PEEP 8, FiO2 80  Wean oxygen as tolerated.  Keep O2 sat 90-92%    Cardiovascular   Atrial Fibrillation  Amiodarone 450 mg   Rate improving      Gastrointestinal   On Protonix    Renal   Hypomagnesium  Magnesium given in ER  Trend Mag  replete     Infectious Disease   Septic shock    On Vasopressin, will transition Levophed to NeoSynephrine  Cultures sent,   ID consulted, meropenem started  Hematology/Oncology   Trend Lactic, Lactic 6.0    Endocrine   Hyperglycemia  Insulin drip added,hypoglycemia protocol initiated      Social/Spiritual/DNR/Other   Patient from facility  Full Code in records    Larry Brock DO  Emergency Medicine PGY-1  2:24 PM  03/08/22            \"Thank you for asking us to see this complex patient. \"    Total critical care time caring for this patient with life threatening, unstable organ failure, including direct patient contact, management of life support systems, review of data including imaging and labs, discussions with other team members and physicians at least   Min so far today, excluding procedures. Please feel free to call with questions or concerns. I personally saw, examined and provided care for the patient. Radiographs, labs and medication list were reviewed by me independently. Review of Residents documentation was conducted and revisions were made as appropriate. I agree with the above documented exam, problem list and plan of care. Patient with multiple comorbidities presents feeling unwell. Developed acute hypercapnic respiratory failure requiring intubation and significant shock. Went in [facility] with RVR with worsening and hypotension. Assessment:    1. Shock, multifactorial  2. Severe sepsis undetermined source  3. A. fib with RVR  4. Acute on chronic respiratory failure requiring mechanical ventilation  5. chronic lymphedema  6. Morbid obesity BMI 51  7. Chronic diastolic heart failure  8. Medical noncompliance        Consultants: ID     Full mechanical ventilation, repeat ABG adjust accordingly   Continue IV fluid resuscitation, NICOM, lactate   Broad-spectrum empiric antibiotic coverage, culture data in process.   ID consulted   Pressors for MAP over 65   Amiodarone bolus infusion   Solu-Cortef    GI/DVT - SUP/Lovenox 40 every 12      CCT excluding procedures 43 minutes    Jaydon DO Tony

## 2022-03-09 ENCOUNTER — APPOINTMENT (OUTPATIENT)
Dept: GENERAL RADIOLOGY | Age: 67
DRG: 870 | End: 2022-03-09
Payer: MEDICARE

## 2022-03-09 LAB
ACINETOBACTER CALCOAC BAUMANNII COMPLEX BY PCR: NOT DETECTED
ANION GAP SERPL CALCULATED.3IONS-SCNC: 11 MMOL/L (ref 7–16)
ANION GAP SERPL CALCULATED.3IONS-SCNC: 13 MMOL/L (ref 7–16)
ANION GAP SERPL CALCULATED.3IONS-SCNC: 15 MMOL/L (ref 7–16)
ANTISTREPTOLYSIN-O: 434 IU/ML (ref 0–200)
B.E.: 3.2 MMOL/L (ref -3–3)
BACTEROIDES FRAGILIS BY PCR: NOT DETECTED
BASOPHILS ABSOLUTE: 0.05 E9/L (ref 0–0.2)
BASOPHILS RELATIVE PERCENT: 0.2 % (ref 0–2)
BOTTLE TYPE: ABNORMAL
BUN BLDV-MCNC: 37 MG/DL (ref 6–23)
BUN BLDV-MCNC: 39 MG/DL (ref 6–23)
BUN BLDV-MCNC: 39 MG/DL (ref 6–23)
C-REACTIVE PROTEIN: 13.7 MG/DL (ref 0–0.4)
CALCIUM SERPL-MCNC: 8.5 MG/DL (ref 8.6–10.2)
CALCIUM SERPL-MCNC: 8.6 MG/DL (ref 8.6–10.2)
CALCIUM SERPL-MCNC: 9 MG/DL (ref 8.6–10.2)
CANDIDA ALBICANS BY PCR: NOT DETECTED
CANDIDA AURIS BY PCR: NOT DETECTED
CANDIDA GLABRATA BY PCR: NOT DETECTED
CANDIDA KRUSEI BY PCR: NOT DETECTED
CANDIDA PARAPSILOSIS BY PCR: NOT DETECTED
CANDIDA TROPICALIS BY PCR: NOT DETECTED
CHLORIDE BLD-SCNC: 98 MMOL/L (ref 98–107)
CO2: 26 MMOL/L (ref 22–29)
CO2: 27 MMOL/L (ref 22–29)
CO2: 28 MMOL/L (ref 22–29)
COHB: 0.5 % (ref 0–1.5)
CORTISOL TOTAL: 35.06 MCG/DL (ref 2.68–18.4)
CREAT SERPL-MCNC: 1.3 MG/DL (ref 0.5–1)
CRITICAL: ABNORMAL
CRYPTOCOCCUS NEOFORMANS/GATTII BY PCR: NOT DETECTED
DATE ANALYZED: ABNORMAL
DATE OF COLLECTION: ABNORMAL
ENTEROBACTER CLOACAE COMPLEX BY PCR: NOT DETECTED
ENTEROBACTERALES BY PCR: NOT DETECTED
ENTEROCOCCUS FAECALIS BY PCR: NOT DETECTED
ENTEROCOCCUS FAECIUM BY PCR: NOT DETECTED
EOSINOPHILS ABSOLUTE: 0 E9/L (ref 0.05–0.5)
EOSINOPHILS RELATIVE PERCENT: 0 % (ref 0–6)
ESCHERICHIA COLI BY PCR: NOT DETECTED
FIO2: 55 %
GFR AFRICAN AMERICAN: 49
GFR NON-AFRICAN AMERICAN: 41 ML/MIN/1.73
GLUCOSE BLD-MCNC: 126 MG/DL (ref 74–99)
GLUCOSE BLD-MCNC: 138 MG/DL (ref 74–99)
GLUCOSE BLD-MCNC: 181 MG/DL (ref 74–99)
HAEMOPHILUS INFLUENZAE BY PCR: NOT DETECTED
HBA1C MFR BLD: 7.6 % (ref 4–5.6)
HCO3: 26.2 MMOL/L (ref 22–26)
HCT VFR BLD CALC: 29.6 % (ref 34–48)
HEMOGLOBIN: 9.2 G/DL (ref 11.5–15.5)
HHB: 1.3 % (ref 0–5)
IMMATURE GRANULOCYTES #: 1.01 E9/L
IMMATURE GRANULOCYTES %: 4.5 % (ref 0–5)
KLEBSIELLA AEROGENES BY PCR: NOT DETECTED
KLEBSIELLA OXYTOCA BY PCR: NOT DETECTED
KLEBSIELLA PNEUMONIAE GROUP BY PCR: NOT DETECTED
LAB: ABNORMAL
LACTIC ACID: 2.8 MMOL/L (ref 0.5–2.2)
LACTIC ACID: 2.9 MMOL/L (ref 0.5–2.2)
LACTIC ACID: 3.6 MMOL/L (ref 0.5–2.2)
LACTIC ACID: 4.4 MMOL/L (ref 0.5–2.2)
LISTERIA MONOCYTOGENES BY PCR: NOT DETECTED
LYMPHOCYTES ABSOLUTE: 1.01 E9/L (ref 1.5–4)
LYMPHOCYTES RELATIVE PERCENT: 4.5 % (ref 20–42)
Lab: ABNORMAL
MAGNESIUM: 2 MG/DL (ref 1.6–2.6)
MAGNESIUM: 2.1 MG/DL (ref 1.6–2.6)
MAGNESIUM: 2.1 MG/DL (ref 1.6–2.6)
MCH RBC QN AUTO: 25.6 PG (ref 26–35)
MCHC RBC AUTO-ENTMCNC: 31.1 % (ref 32–34.5)
MCV RBC AUTO: 82.5 FL (ref 80–99.9)
METER GLUCOSE: 126 MG/DL (ref 74–99)
METER GLUCOSE: 127 MG/DL (ref 74–99)
METER GLUCOSE: 139 MG/DL (ref 74–99)
METER GLUCOSE: 140 MG/DL (ref 74–99)
METER GLUCOSE: 145 MG/DL (ref 74–99)
METER GLUCOSE: 151 MG/DL (ref 74–99)
METER GLUCOSE: 152 MG/DL (ref 74–99)
METER GLUCOSE: 174 MG/DL (ref 74–99)
METER GLUCOSE: 215 MG/DL (ref 74–99)
METER GLUCOSE: 263 MG/DL (ref 74–99)
METER GLUCOSE: 314 MG/DL (ref 74–99)
METER GLUCOSE: 57 MG/DL (ref 74–99)
METER GLUCOSE: 95 MG/DL (ref 74–99)
METHB: 0.3 % (ref 0–1.5)
MODE: AC
MONOCYTES ABSOLUTE: 0.8 E9/L (ref 0.1–0.95)
MONOCYTES RELATIVE PERCENT: 3.6 % (ref 2–12)
NEISSERIA MENINGITIDIS BY PCR: NOT DETECTED
NEUTROPHILS ABSOLUTE: 19.64 E9/L (ref 1.8–7.3)
NEUTROPHILS RELATIVE PERCENT: 87.2 % (ref 43–80)
O2 CONTENT: 13.7 ML/DL
O2 SATURATION: 98.7 % (ref 92–98.5)
O2HB: 97.9 % (ref 94–97)
OPERATOR ID: 2485
ORDER NUMBER: ABNORMAL
PATIENT TEMP: 27 C
PCO2: 33.9 MMHG (ref 35–45)
PDW BLD-RTO: 15.5 FL (ref 11.5–15)
PEEP/CPAP: 8 CMH2O
PFO2: 2.36 MMHG/%
PH BLOOD GAS: 7.51 (ref 7.35–7.45)
PHOSPHORUS: 2.3 MG/DL (ref 2.5–4.5)
PHOSPHORUS: 2.6 MG/DL (ref 2.5–4.5)
PHOSPHORUS: 3.3 MG/DL (ref 2.5–4.5)
PLATELET # BLD: 207 E9/L (ref 130–450)
PMV BLD AUTO: 10.2 FL (ref 7–12)
PO2: 130 MMHG (ref 75–100)
POTASSIUM SERPL-SCNC: 4.2 MMOL/L (ref 3.5–5)
POTASSIUM SERPL-SCNC: 4.4 MMOL/L (ref 3.5–5)
POTASSIUM SERPL-SCNC: 4.5 MMOL/L (ref 3.5–5)
PROTEUS SPECIES BY PCR: NOT DETECTED
PSEUDOMONAS AERUGINOSA BY PCR: NOT DETECTED
RBC # BLD: 3.59 E12/L (ref 3.5–5.5)
RBC # BLD: NORMAL 10*6/UL
RR MECHANICAL: 22 B/MIN
SALMONELLA SPECIES BY PCR: NOT DETECTED
SERRATIA MARCESCENS BY PCR: NOT DETECTED
SODIUM BLD-SCNC: 137 MMOL/L (ref 132–146)
SODIUM BLD-SCNC: 138 MMOL/L (ref 132–146)
SODIUM BLD-SCNC: 139 MMOL/L (ref 132–146)
SOURCE OF BLOOD CULTURE: ABNORMAL
SOURCE, BLOOD GAS: ABNORMAL
STAPHYLOCOCCUS AUREUS BY PCR: NOT DETECTED
STAPHYLOCOCCUS EPIDERMIDIS BY PCR: NOT DETECTED
STAPHYLOCOCCUS LUGDUNENSIS BY PCR: NOT DETECTED
STAPHYLOCOCCUS SPECIES BY PCR: NOT DETECTED
STENOTROPHOMONAS MALTOPHILIA BY PCR: NOT DETECTED
STREPTOCOCCUS AGALACTIAE BY PCR: NOT DETECTED
STREPTOCOCCUS PNEUMONIAE BY PCR: NOT DETECTED
STREPTOCOCCUS PYOGENES  BY PCR: NOT DETECTED
STREPTOCOCCUS SPECIES BY PCR: DETECTED
THB: 9.8 G/DL (ref 11.5–16.5)
TIME ANALYZED: 637
VT MECHANICAL: 420 ML
WBC # BLD: 22.5 E9/L (ref 4.5–11.5)

## 2022-03-09 PROCEDURE — 37799 UNLISTED PX VASCULAR SURGERY: CPT

## 2022-03-09 PROCEDURE — 2580000003 HC RX 258

## 2022-03-09 PROCEDURE — 6370000000 HC RX 637 (ALT 250 FOR IP): Performed by: STUDENT IN AN ORGANIZED HEALTH CARE EDUCATION/TRAINING PROGRAM

## 2022-03-09 PROCEDURE — C1751 CATH, INF, PER/CENT/MIDLINE: HCPCS

## 2022-03-09 PROCEDURE — 83735 ASSAY OF MAGNESIUM: CPT

## 2022-03-09 PROCEDURE — 82962 GLUCOSE BLOOD TEST: CPT

## 2022-03-09 PROCEDURE — 94002 VENT MGMT INPAT INIT DAY: CPT

## 2022-03-09 PROCEDURE — 6370000000 HC RX 637 (ALT 250 FOR IP): Performed by: INTERNAL MEDICINE

## 2022-03-09 PROCEDURE — 84100 ASSAY OF PHOSPHORUS: CPT

## 2022-03-09 PROCEDURE — 2580000003 HC RX 258: Performed by: STUDENT IN AN ORGANIZED HEALTH CARE EDUCATION/TRAINING PROGRAM

## 2022-03-09 PROCEDURE — 6360000002 HC RX W HCPCS: Performed by: SPECIALIST

## 2022-03-09 PROCEDURE — 83036 HEMOGLOBIN GLYCOSYLATED A1C: CPT

## 2022-03-09 PROCEDURE — 36415 COLL VENOUS BLD VENIPUNCTURE: CPT

## 2022-03-09 PROCEDURE — 71045 X-RAY EXAM CHEST 1 VIEW: CPT

## 2022-03-09 PROCEDURE — 2580000003 HC RX 258: Performed by: INTERNAL MEDICINE

## 2022-03-09 PROCEDURE — 94003 VENT MGMT INPAT SUBQ DAY: CPT

## 2022-03-09 PROCEDURE — 87040 BLOOD CULTURE FOR BACTERIA: CPT

## 2022-03-09 PROCEDURE — 2500000003 HC RX 250 WO HCPCS: Performed by: STUDENT IN AN ORGANIZED HEALTH CARE EDUCATION/TRAINING PROGRAM

## 2022-03-09 PROCEDURE — 6360000002 HC RX W HCPCS: Performed by: STUDENT IN AN ORGANIZED HEALTH CARE EDUCATION/TRAINING PROGRAM

## 2022-03-09 PROCEDURE — 83605 ASSAY OF LACTIC ACID: CPT

## 2022-03-09 PROCEDURE — 80048 BASIC METABOLIC PNL TOTAL CA: CPT

## 2022-03-09 PROCEDURE — 2000000000 HC ICU R&B

## 2022-03-09 PROCEDURE — 6360000002 HC RX W HCPCS: Performed by: INTERNAL MEDICINE

## 2022-03-09 PROCEDURE — 85025 COMPLETE CBC W/AUTO DIFF WBC: CPT

## 2022-03-09 PROCEDURE — 82805 BLOOD GASES W/O2 SATURATION: CPT

## 2022-03-09 PROCEDURE — C9113 INJ PANTOPRAZOLE SODIUM, VIA: HCPCS | Performed by: INTERNAL MEDICINE

## 2022-03-09 PROCEDURE — 2580000003 HC RX 258: Performed by: SPECIALIST

## 2022-03-09 RX ORDER — FENTANYL CITRATE 50 UG/ML
50 INJECTION, SOLUTION INTRAMUSCULAR; INTRAVENOUS ONCE
Status: DISCONTINUED | OUTPATIENT
Start: 2022-03-09 | End: 2022-03-11

## 2022-03-09 RX ORDER — MIDAZOLAM HYDROCHLORIDE 2 MG/2ML
2 INJECTION, SOLUTION INTRAMUSCULAR; INTRAVENOUS ONCE
Status: DISCONTINUED | OUTPATIENT
Start: 2022-03-09 | End: 2022-03-11

## 2022-03-09 RX ORDER — SODIUM CHLORIDE 0.9 % (FLUSH) 0.9 %
SYRINGE (ML) INJECTION
Status: COMPLETED
Start: 2022-03-09 | End: 2022-03-09

## 2022-03-09 RX ORDER — SODIUM CHLORIDE, SODIUM LACTATE, POTASSIUM CHLORIDE, CALCIUM CHLORIDE 600; 310; 30; 20 MG/100ML; MG/100ML; MG/100ML; MG/100ML
INJECTION, SOLUTION INTRAVENOUS CONTINUOUS
Status: ACTIVE | OUTPATIENT
Start: 2022-03-09 | End: 2022-03-10

## 2022-03-09 RX ORDER — SODIUM CHLORIDE 0.9 % (FLUSH) 0.9 %
5-40 SYRINGE (ML) INJECTION 2 TIMES DAILY
Status: DISCONTINUED | OUTPATIENT
Start: 2022-03-09 | End: 2022-03-16

## 2022-03-09 RX ADMIN — CHLORHEXIDINE GLUCONATE 0.12% ORAL RINSE 15 ML: 1.2 LIQUID ORAL at 20:02

## 2022-03-09 RX ADMIN — SODIUM CHLORIDE, PRESERVATIVE FREE 10 ML: 5 INJECTION INTRAVENOUS at 11:27

## 2022-03-09 RX ADMIN — Medication 125 MCG/HR: at 07:58

## 2022-03-09 RX ADMIN — VASOPRESSIN 0.04 UNITS/MIN: 20 INJECTION PARENTERAL at 03:35

## 2022-03-09 RX ADMIN — HYDROCORTISONE SODIUM SUCCINATE 100 MG: 100 INJECTION, POWDER, FOR SOLUTION INTRAMUSCULAR; INTRAVENOUS at 06:46

## 2022-03-09 RX ADMIN — FENTANYL CITRATE 50 MCG: 50 INJECTION, SOLUTION INTRAMUSCULAR; INTRAVENOUS at 10:08

## 2022-03-09 RX ADMIN — WATER 2000 MG: 1 INJECTION INTRAMUSCULAR; INTRAVENOUS; SUBCUTANEOUS at 13:20

## 2022-03-09 RX ADMIN — MIDAZOLAM HYDROCHLORIDE 2 MG: 1 INJECTION, SOLUTION INTRAMUSCULAR; INTRAVENOUS at 15:32

## 2022-03-09 RX ADMIN — SODIUM CHLORIDE 9.12 UNITS/HR: 9 INJECTION, SOLUTION INTRAVENOUS at 00:06

## 2022-03-09 RX ADMIN — MIDAZOLAM HYDROCHLORIDE 2 MG: 1 INJECTION, SOLUTION INTRAMUSCULAR; INTRAVENOUS at 08:34

## 2022-03-09 RX ADMIN — Medication 200 MCG/HR: at 22:08

## 2022-03-09 RX ADMIN — Medication 200 MCG/HR: at 15:11

## 2022-03-09 RX ADMIN — ENOXAPARIN SODIUM 135 MG: 150 INJECTION SUBCUTANEOUS at 20:03

## 2022-03-09 RX ADMIN — CHLORHEXIDINE GLUCONATE 0.12% ORAL RINSE 15 ML: 1.2 LIQUID ORAL at 08:42

## 2022-03-09 RX ADMIN — SODIUM CHLORIDE, POTASSIUM CHLORIDE, SODIUM LACTATE AND CALCIUM CHLORIDE: 600; 310; 30; 20 INJECTION, SOLUTION INTRAVENOUS at 10:31

## 2022-03-09 RX ADMIN — PANTOPRAZOLE SODIUM 40 MG: 40 INJECTION, POWDER, FOR SOLUTION INTRAVENOUS at 08:47

## 2022-03-09 RX ADMIN — HYDROCORTISONE SODIUM SUCCINATE 100 MG: 100 INJECTION, POWDER, FOR SOLUTION INTRAMUSCULAR; INTRAVENOUS at 23:52

## 2022-03-09 RX ADMIN — INSULIN LISPRO 4 UNITS: 100 INJECTION, SOLUTION INTRAVENOUS; SUBCUTANEOUS at 16:36

## 2022-03-09 RX ADMIN — ENOXAPARIN SODIUM 40 MG: 100 INJECTION SUBCUTANEOUS at 08:47

## 2022-03-09 RX ADMIN — PHENYLEPHRINE HYDROCHLORIDE 110 MCG/MIN: 100 INJECTION INTRAVENOUS at 01:05

## 2022-03-09 RX ADMIN — MICONAZOLE NITRATE: 20 POWDER TOPICAL at 10:15

## 2022-03-09 RX ADMIN — HYDROCORTISONE SODIUM SUCCINATE 100 MG: 100 INJECTION, POWDER, FOR SOLUTION INTRAMUSCULAR; INTRAVENOUS at 00:03

## 2022-03-09 RX ADMIN — INSULIN LISPRO 8 UNITS: 100 INJECTION, SOLUTION INTRAVENOUS; SUBCUTANEOUS at 20:03

## 2022-03-09 RX ADMIN — Medication 6 MG/HR: at 18:46

## 2022-03-09 RX ADMIN — MICONAZOLE NITRATE: 20 POWDER TOPICAL at 20:06

## 2022-03-09 RX ADMIN — SODIUM CHLORIDE, POTASSIUM CHLORIDE, SODIUM LACTATE AND CALCIUM CHLORIDE: 600; 310; 30; 20 INJECTION, SOLUTION INTRAVENOUS at 23:51

## 2022-03-09 RX ADMIN — MIDAZOLAM HYDROCHLORIDE 2 MG: 2 INJECTION, SOLUTION INTRAMUSCULAR; INTRAVENOUS at 10:08

## 2022-03-09 RX ADMIN — INSULIN LISPRO 6 UNITS: 100 INJECTION, SOLUTION INTRAVENOUS; SUBCUTANEOUS at 23:53

## 2022-03-09 RX ADMIN — PHENYLEPHRINE HYDROCHLORIDE 100 MCG/MIN: 100 INJECTION INTRAVENOUS at 09:09

## 2022-03-09 RX ADMIN — SODIUM CHLORIDE, PRESERVATIVE FREE 10 ML: 5 INJECTION INTRAVENOUS at 20:02

## 2022-03-09 RX ADMIN — MEROPENEM 1000 MG: 1 INJECTION, POWDER, FOR SOLUTION INTRAVENOUS at 06:46

## 2022-03-09 RX ADMIN — VASOPRESSIN 0.04 UNITS/MIN: 20 INJECTION PARENTERAL at 13:20

## 2022-03-09 RX ADMIN — VASOPRESSIN 0.04 UNITS/MIN: 20 INJECTION PARENTERAL at 21:34

## 2022-03-09 RX ADMIN — ENOXAPARIN SODIUM 90 MG: 100 INJECTION SUBCUTANEOUS at 10:40

## 2022-03-09 RX ADMIN — HYDROCORTISONE SODIUM SUCCINATE 100 MG: 100 INJECTION, POWDER, FOR SOLUTION INTRAMUSCULAR; INTRAVENOUS at 15:28

## 2022-03-09 RX ADMIN — PHENYLEPHRINE HYDROCHLORIDE 60 MCG/MIN: 100 INJECTION INTRAVENOUS at 19:54

## 2022-03-09 ASSESSMENT — PULMONARY FUNCTION TESTS
PIF_VALUE: 31
PIF_VALUE: 31
PIF_VALUE: 32
PIF_VALUE: 44
PIF_VALUE: 30
PIF_VALUE: 31
PIF_VALUE: 35
PIF_VALUE: 31
PIF_VALUE: 39
PIF_VALUE: 33
PIF_VALUE: 32
PIF_VALUE: 31
PIF_VALUE: 30
PIF_VALUE: 29
PIF_VALUE: 32
PIF_VALUE: 31
PIF_VALUE: 32
PIF_VALUE: 48
PIF_VALUE: 31
PIF_VALUE: 32
PIF_VALUE: 33
PIF_VALUE: 31
PIF_VALUE: 35
PIF_VALUE: 38
PIF_VALUE: 32
PIF_VALUE: 31
PIF_VALUE: 31
PIF_VALUE: 29
PIF_VALUE: 30
PIF_VALUE: 32

## 2022-03-09 ASSESSMENT — PAIN SCALES - GENERAL
PAINLEVEL_OUTOF10: 0
PAINLEVEL_OUTOF10: 0

## 2022-03-09 NOTE — PLAN OF CARE
Problem: Non-Violent Restraints  Goal: No harm/injury to patient while restraints in use  Outcome: Met This Shift  Goal: Patient's dignity will be maintained  Outcome: Met This Shift     Problem: Aspiration:  Goal: Absence of aspiration  Description: Absence of aspiration  3/9/2022 0702 by Jose Crowe RN  Outcome: Met This Shift     Problem:  Bowel Function - Altered:  Goal: Bowel elimination is within specified parameters  Description: Bowel elimination is within specified parameters  3/9/2022 0702 by Jose Crowe RN  Outcome: Met This Shift     Problem: Cardiac Output - Decreased:  Goal: Hemodynamic stability will improve  Description: Hemodynamic stability will improve  3/9/2022 0702 by Jose Crowe RN  Outcome: Met This Shift     Problem: Gas Exchange - Impaired:  Goal: Levels of oxygenation will improve  Description: Levels of oxygenation will improve  3/9/2022 0702 by Jose Crowe RN  Outcome: Met This Shift     Problem: Mental Status - Impaired:  Goal: Mental status will be restored to baseline  Description: Mental status will be restored to baseline  3/9/2022 0702 by Jose Crowe RN  Outcome: Met This Shift     Problem: Pain:  Goal: Recognizes and communicates pain  Description: Recognizes and communicates pain  3/9/2022 0702 by Jose Crowe RN  Outcome: Met This Shift  Goal: Control of chronic pain  Description: Control of chronic pain  3/9/2022 0702 by Jose Crowe RN  Outcome: Met This Shift     Problem: Skin Integrity - Impaired:  Goal: Absence of new skin breakdown  Description: Absence of new skin breakdown  3/9/2022 1733 by Beckey Gaucher, RN  Outcome: Met This Shift  3/9/2022 0702 by Jose Crowe RN  Outcome: Met This Shift     Problem: Tissue Perfusion, Altered:  Goal: Circulatory function within specified parameters  Description: Circulatory function within specified parameters  3/9/2022 0702 by Jose Crowe RN  Outcome: Met This Shift     Problem: Tissue Perfusion - Cardiopulmonary, Altered:  Goal: Absence of angina  Description: Absence of angina  3/9/2022 0702 by Yoseph Cavazos RN  Outcome: Met This Shift  Goal: Hemodynamic stability will improve  Description: Hemodynamic stability will improve  3/9/2022 0702 by Yoseph Cavazos RN  Outcome: Met This Shift     Problem: Inadequate oral food/beverage intake (NI-2.1)  Goal: Food and/or Nutrient Delivery  Description: Individualized approach for food/nutrient provision.   3/9/2022 1016 by [facility] ANUM GARCIA, CNSC, LD  Outcome: Met This Shift

## 2022-03-09 NOTE — PROGRESS NOTES
membranes, no ulcerations, no thrush. ETT. NGT  Neck: Supple to movements. No lymphadenopathy. Chest: No use of accessory muscles to breathe. Symmetrical expansion. Auscultation reveals coarse breath sounds, no wheezing, crackles, or rhonchi. Cardiovascular: Heart sounds rhythmic and regular. Abdomen: Positive bowel heart sounds rhythmic and regular. Sounds to auscultation. Benign to palpation. Large and pendulous. Intertrigo in the groin area. Extremities: Bilateral lower extremity lymphedema with lymphoceles. Some erythema on the right leg. Lines: Right radial arterial line 3/8/2022. TLC 3/8/2022.     Laboratory and Tests Review:  Lab Results   Component Value Date    WBC 22.5 (H) 03/09/2022    WBC 7.3 03/08/2022    WBC 13.0 (H) 08/31/2021    HGB 9.2 (L) 03/09/2022    HCT 29.6 (L) 03/09/2022    MCV 82.5 03/09/2022     03/09/2022     Lab Results   Component Value Date    NEUTROABS 19.64 (H) 03/09/2022    NEUTROABS 6.61 03/08/2022    NEUTROABS 10.92 (H) 08/31/2021     Lab Results   Component Value Date    CRP 13.7 (H) 03/08/2022    CRP 5.3 (H) 07/05/2021    CRP 14.0 (H) 05/29/2020     No results found for: CRPHS  Lab Results   Component Value Date    SEDRATE 30 (H) 03/08/2022    SEDRATE 41 (H) 07/05/2021    SEDRATE 75 (H) 05/29/2020     Lab Results   Component Value Date    ALT 19 03/08/2022    AST 34 (H) 03/08/2022    ALKPHOS 103 03/08/2022    BILITOT 0.3 03/08/2022     Lab Results   Component Value Date     03/09/2022    K 4.2 03/09/2022    K 5.7 03/08/2022    CL 98 03/09/2022    CO2 28 03/09/2022    BUN 39 03/09/2022    CREATININE 1.3 03/09/2022    CREATININE 1.3 03/09/2022    CREATININE 1.3 03/09/2022    GFRAA 49 03/09/2022    LABGLOM 41 03/09/2022    GLUCOSE 126 03/09/2022    GLUCOSE 181 12/16/2011    PROT 7.2 03/08/2022    LABALBU 3.8 03/08/2022    LABALBU 4.5 11/02/2011    CALCIUM 8.5 03/09/2022    BILITOT 0.3 03/08/2022    ALKPHOS 103 03/08/2022    AST 34 03/08/2022    ALT 19 03/08/2022     Radiology:      Microbiology:   No new cultures  Recent Labs     03/08/22  0607   BC Gram stain performed from blood culture bottle media  Gram positive cocci in chains  *     No results for input(s): ORG in the last 72 hours. Recent Labs     03/08/22  0541   BLOODCULT2 Gram stain performed from blood culture bottle media  Gram positive cocci in chains  *     No results for input(s): STREPNEUMAGU in the last 72 hours. No results for input(s): LP1UAG in the last 72 hours. Recent Labs     03/08/22  1550   *     No results for input(s): CULTRESP in the last 72 hours. No results for input(s): PROCAL in the last 72 hours.     ASSESSMENT:  · Probable cellulitis right lower extremity  · Alphahemolytic Streptococcus septicemia  · Sepsis with septic shock  · Acute respiratory failure   · Sodium leukocytosis  · Lactic acidosis, improving    PLAN:  · Stop Meropenem  · Start Ceftriaxone  · Repeat blood cultures x2 today  · We will follow with you    Spoke with Cristhian Stern MD  12:31 PM  3/9/2022

## 2022-03-09 NOTE — PLAN OF CARE
Problem: Airway Clearance - Ineffective:  Goal: Ability to maintain a clear airway will improve  Description: Ability to maintain a clear airway will improve  Outcome: Met This Shift     Problem: Anxiety/Stress:  Goal: Level of anxiety will decrease  Description: Level of anxiety will decrease  Outcome: Not Met This Shift     Problem: Aspiration:  Goal: Absence of aspiration  Description: Absence of aspiration  Outcome: Met This Shift     Problem:  Bowel Function - Altered:  Goal: Bowel elimination is within specified parameters  Description: Bowel elimination is within specified parameters  Outcome: Met This Shift     Problem: Cardiac Output - Decreased:  Goal: Hemodynamic stability will improve  Description: Hemodynamic stability will improve  Outcome: Met This Shift     Problem: Fluid Volume - Imbalance:  Goal: Absence of imbalanced fluid volume signs and symptoms  Description: Absence of imbalanced fluid volume signs and symptoms  Outcome: Met This Shift     Problem: Gas Exchange - Impaired:  Goal: Levels of oxygenation will improve  Description: Levels of oxygenation will improve  Outcome: Met This Shift     Problem: Mental Status - Impaired:  Goal: Mental status will be restored to baseline  Description: Mental status will be restored to baseline  Outcome: Met This Shift     Problem: Nutrition Deficit:  Goal: Ability to achieve adequate nutritional intake will improve  Description: Ability to achieve adequate nutritional intake will improve  Outcome: Not Met This Shift     Problem: Pain:  Goal: Pain level will decrease  Description: Pain level will decrease  Outcome: Met This Shift  Goal: Recognizes and communicates pain  Description: Recognizes and communicates pain  Outcome: Met This Shift  Goal: Control of chronic pain  Description: Control of chronic pain  Outcome: Met This Shift     Problem: Serum Glucose Level - Abnormal:  Goal: Ability to maintain appropriate glucose levels will improve to within specified parameters  Description: Ability to maintain appropriate glucose levels will improve to within specified parameters  Outcome: Ongoing     Problem: Skin Integrity - Impaired:  Goal: Will show no infection signs and symptoms  Description: Will show no infection signs and symptoms  Outcome: Met This Shift  Goal: Absence of new skin breakdown  Description: Absence of new skin breakdown  Outcome: Met This Shift     Problem: Sleep Pattern Disturbance:  Goal: Appears well-rested  Description: Appears well-rested  Outcome: Not Met This Shift     Problem: Tissue Perfusion, Altered:  Goal: Circulatory function within specified parameters  Description: Circulatory function within specified parameters  Outcome: Met This Shift     Problem: Tissue Perfusion - Cardiopulmonary, Altered:  Goal: Absence of angina  Description: Absence of angina  Outcome: Met This Shift  Goal: Hemodynamic stability will improve  Description: Hemodynamic stability will improve  Outcome: Met This Shift

## 2022-03-09 NOTE — PROGRESS NOTES
Medical Intensive Care Unit      [facility]  Resident Progress Note    Date and time: 3/9/2022 1:39 PM  Patient's name:  [address] Record Number: [id number]  Patient's YOB: 1955  Age: 74 y.o. Date of Admission: 3/8/2022  4:28 AM  Length of stay during current admission: 1  Primary Care Physician: Joel Jordan DO    Code Status: Prior    Reason for ICU admission: Sepsis    SUBJECTIVE:   OVERNIGHT EVENTS:       3-8-2022-3-9-2022: Patient has converted to sinus rhythm, opening eyes with spells of anxious awakenings. Levophed stopped. Remains on Vent. Admission HPI: The patient is a 74 y.o. female with significant past medical history of Paroxysmal Afib, HLD, Pulmonary Hypertension, diabetic neuropathy, Chroninc diastolic heart failure, Lymphedema and cellulitis of both lower extremities, Chronic Anemia, Uncontrolled diabetes mellitus, HTN, Chronic Anal Fissure, PFO, chronic DVT of right lower extremity, GI bleed, Adrenal mass, Stress Fracture of Right Fibula, presenting to the intensive care unit in septic shock. Patient presented to the ED for evaluation of cough, hyperglycemia, chills, nausea, abdominal pain. Patient on Cardizem to 40 mg p.o. twice daily, chronic DVT of right lower extremity, 5 mg Eliquis twice daily. Patient is on 6 L nasal cannula oxygen at baseline. Patient has associated diarrhea, swelling and pain in bilateral lower extremities. Patient reports acute worsening of swelling and pain in bilateral lower extremities to ER staff. EMS reported blood glucose was 442Patient was found to be tachycardic in the 140s, with initial rhythm sinus on EKG. 1 L fluid given. Echo from 4/14/2021 showed EF of 60 to 65%. While patient was not x-ray, was found to be agonal breathing, unresponsive with cyanotic appearing on home oxygen of 6 L by ER staff. Dr. Tiffanie Swift bag-valve-mask ventilation with rapid sequence intubation utilizing propofol.  ER staff noted that right lower extremity displayed increased erythema/discoloration from previous in the encounter. ET tube was found to be 25 cm, pulled back to 22 cm. Magnesium found to be 1.4, 4 mg of IV magnesium ordered. Patient started on IV clindamycin. CTA demonstrated no pulmonary embolism with dilatation of the pulmonary trunk and arteries consistent with pulmonary hypertension. Also demonstrates patchy bibasilar infiltrates representing possible pneumonia. Increasing right adrenal mass measuring 5 cm from previous. While in the ER patient was noted to have tachycardia within the 160s to 170s, with mean arterial pressure in the 50s. Patient was started on Levophed, vasopressin was added on due to continued hypotension. Patient started on Solu-Cortef. Patient was started on vancomycin, cefepime, clindamycin. Patient noted to have A. fib in the ICU, started on amiodarone. Infectious disease consulted, meropenem substituted.         CURRENT VENTILATION STATUS:     [x] Ventilator  [] BIPAP  [] Nasal Cannula [] Room Air      IF INTUBATED, ET TUBE MARKING AT LOWER LIP:       cms    SECRETIONS   Amount:  [] Small [] Moderate  [] Large [] None    Color:     [] White [] Colored  [] Bloody    SEDATION:  RAAS Score:  [] Propofol gtt  [x] Versed gtt  [] Ativan gtt   [] No Sedation    PARALYZED:  [x] No    [] Yes    VASOPRESSORS:  [] No    [x] Yes    If yes -   [] Levophed       [] Dopamine     [x] Vasopressin       [] Dobutamine  [x] Phenylephrine         [] Epinephrine    CENTRAL LINES:     [] No   [x] Yes   (Date of Insertion:   )           If yes -     [] Right IJ     [] Left IJ [] Right Femoral [] Left Femoral                   [] Right Subclavian [] Left Subclavian       BRAUN'S CATHETER:   [] No   [x] Yes  (Date of Insertion:   )     URINE OUTPUT:            [] Good   [] Low              [] Anuric      OBJECTIVE:     VITAL SIGNS:  BP (!) 126/92   Pulse 83   Temp 100 °F (37.8 °C) (Bladder)   Resp 28   Ht 5' 4\" (1.626 m)   Wt 298 lb (135.2 kg)   LMP  (LMP Unknown)   SpO2 98%   BMI 51.15 kg/m²   Tmax over 24 hours:  Temp (24hrs), Av.3 °F (38.5 °C), Min:100 °F (37.8 °C), Max:102.6 °F (39.2 °C)      Patient Vitals for the past 6 hrs:   Temp Temp src Pulse Resp SpO2 Height   22 1213 -- -- 83 28 98 % --   22 1200 100 °F (37.8 °C) Bladder 82 18 99 % --   22 1158 -- -- 83 18 99 % --   22 1100 -- -- 83 18 99 % --   22 1000 -- -- 90 22 100 % --   22 0913 -- -- 97 18 -- --   22 0900 -- -- 84 22 100 % --   22 0846 -- -- -- -- -- 5' 4\" (1.626 m)   22 0800 100.2 °F (37.9 °C) Bladder 84 22 98 % --         Intake/Output Summary (Last 24 hours) at 3/9/2022 1339  Last data filed at 3/9/2022 1200  Gross per 24 hour   Intake 4785.79 ml   Output 1995 ml   Net 2790.79 ml     Wt Readings from Last 2 Encounters:   22 298 lb (135.2 kg)   21 (!) 304 lb (137.9 kg)     Body mass index is 51.15 kg/m².         PHYSICAL EXAMINATION:  CONSTITUTIONAL:  Sedated and morbidly obese  EYES:  Lids and lashes normal, pupils equal, round and reactive to light, extra ocular muscles intact, sclera clear, conjunctiva normal  ENT:  normocepalic, without obvious abnormality  NECK:  supple, symmetrical, trachea midline  LUNGS:  tachypneic, decreased air exchange and Mild retraction  CARDIOVASCULAR:  irregularly irregular rhythm and  pulses 2+ bilaterally  ABDOMEN:  Non distended, mild diffuse tenderness, no rebound  MSK: Moves all hands and limbs  NEUROLOGIC:  Sedated and intubated, reacts to touch  SKIN:  Erythema and diffuse skin changes in bilateral lower extremities     Any additional physical findings:    MEDICATIONS:    Scheduled Meds:   insulin lispro  0-12 Units SubCUTAneous Q4H    sodium chloride flush  5-40 mL IntraVENous BID    enoxaparin  1 mg/kg SubCUTAneous BID    fentanNYL  50 mcg IntraVENous Once    midazolam  2 mg IntraVENous Once    cefTRIAXone (ROCEPHIN) IV  2,000 mg IntraVENous Q24H    chlorhexidine  15 mL Mouth/Throat BID    pantoprazole  40 mg IntraVENous Daily    hydrocortisone sodium succinate PF  100 mg IntraVENous Q8H    miconazole   Topical BID     Continuous Infusions:   lactated ringers 75 mL/hr at 03/09/22 1037    fentaNYL 5 mcg/ml in 0.9%  ml infusion 200 mcg/hr (03/09/22 1009)    vasopressin (Septic Shock) infusion 0.04 Units/min (03/09/22 1320)    amiodarone 0.5 mg/min (03/09/22 0531)    phenylephrine (DARLYN-SYNEPHRINE) 50mg/250mL infusion 90 mcg/min (03/09/22 1213)    dextrose      midazolam 6 mg/hr (03/09/22 1244)    sodium chloride 20 mL/hr at 03/09/22 0531     PRN Meds:   dextrose bolus (hypoglycemia), 125 mL, PRN   Or  dextrose bolus (hypoglycemia), 250 mL, PRN  glucose, 15 g, PRN  glucagon (rDNA), 1 mg, PRN  dextrose, 100 mL/hr, PRN  midazolam, 2 mg, Q3H PRN  acetaminophen, 650 mg, Q4H PRN  acetaminophen, 650 mg, Q4H PRN          VENT SETTINGS (Comprehensive) (if applicable):  Vent Information  $Ventilation: $Subsequent Day  Vent Type: 980  Vent Mode: AC/VC+  Vt Ordered: 420 mL  Rate Set: 18 bmp  Peak Flow: 0 L/min  Pressure Support: 0 cmH20  FiO2 : 45 %  SpO2: 98 %  SpO2/FiO2 ratio: 217.78  Sensitivity: 3  PEEP/CPAP: 8  I Time/ I Time %: 0.9 s  Humidification Source: Heated wire  Humidification Temp: 37  Humidification Temp Measured: 37.1  Circuit Condensation: Drained  Additional Respiratory  Assessments  Pulse: 83  Resp: 28  SpO2: 98 %  Humidification Source: Heated wire  Humidification Temp: 37  Circuit Condensation: Drained  Oral Care: Mouthwash with chlorhexidine,Mouth suctioned    ABGs:   Recent Labs     03/09/22  0637   PH 7.506*   PCO2 33.9*   PO2 130.0*   HCO3 26.2*   BE 3.2*   O2SAT 98.7*       Laboratory findings:    Complete Blood Count:   Recent Labs     03/08/22  0610 03/09/22  0430   WBC 7.3 22.5*   HGB 10.0* 9.2*   HCT 34.5 29.6*    207        Last 3 Blood Glucose:   Recent Labs     03/09/22  0005 03/09/22  0430 03/09/22  0757   GLUCOSE 181* 138* 126*        PT/INR:    Lab Results   Component Value Date    PROTIME 19.5 04/29/2021    INR 1.8 04/29/2021     PTT:    Lab Results   Component Value Date    APTT 36.3 04/29/2021       Comprehensive Metabolic Profile:   Recent Labs     03/08/22  0610 03/08/22  0722 03/09/22  0005 03/09/22  0430 03/09/22  0757      < > 139 138 137   K 5.7*   < > 4.5 4.4 4.2   CL 96*   < > 98 98 98   CO2 31*   < > 26 27 28   BUN 31*   < > 37* 39* 39*   CREATININE 0.9   < > 1.3* 1.3* 1.3*   GLUCOSE 358*   < > 181* 138* 126*   CALCIUM 9.2   < > 9.0 8.6 8.5*   PROT 7.2  --   --   --   --    LABALBU 3.8  --   --   --   --    BILITOT 0.3  --   --   --   --    ALKPHOS 103  --   --   --   --    AST 34*  --   --   --   --    ALT 19  --   --   --   --     < > = values in this interval not displayed. Magnesium:   Lab Results   Component Value Date    MG 2.1 03/09/2022     Phosphorus:   Lab Results   Component Value Date    PHOS 3.3 03/09/2022     Ionized Calcium:   Lab Results   Component Value Date    CAION 1.37 10/24/2016        Troponin: No results for input(s): TROPONINI in the last 72 hours. Microbiology:  Cultures during this admission:     Blood cultures:                 [] None drawn      [] Negative             []  Positive (Details:  )  Urine Culture:                   [] None drawn      [] Negative             []  Positive (Details:  )  Sputum Culture:               [] None drawn       [] Negative             []  Positive (Details:  )   Endotracheal aspirate:     [] None drawn       [] Negative             []  Positive (Details:  )     Other pertinent Labs:       Radiology/Imaging:     Chest Xray (3/9/2022):       ASSESSMENT  and PLAN:      SYSTEMS ASSESSMENT     Neuro   Intubated on 3/8/2022  On Versed Drip      Respiratory   Intubated   Vent R18, Vt 420,PEEP 8, FiO2 45  Wean oxygen as tolerated.  Keep O2 sat 90-92%     Cardiovascular   Atrial Fibrillation  Amiodarone 16.7 mL/Hr  Currently sinus rhythm        Gastrointestinal   On Protonix     Renal   Hypomagnesium  Magnesium given in ER  Trend Mag  replete     Infectious Disease   Septic shock     On Vasopressin, NeoSynephrine  Cultures sent, positive for  G+ cocci   ID consulted, meropenem started  Hematology/Oncology   Trend Lactic, Lactic 2.8     Endocrine   Hyperglycemia  Glucose 57  Insulin drip discontinue, will transition to medium SSI   On hypoglycemia protocol initiated  Monitor glucose   Social/Spiritual/DNR/Other   Patient from facility  Full Code in records          WEAN PER PROTOCOL:  [] No   [] Yes  [] N/A    DISCONTINUE ANY LABS:   [] No   [] Yes    ICU PROPHYLAXIS:  Stress ulcer:  [] PPI Agent  [] [id number] [] Sucralfate  [] Other:  VTE:   [] Enoxaparin  [] Unfract. Heparin Subcut  [] EPC Cuffs    NUTRITION:  [] NPO [] Tube Feeding (Specify: ) [] TPN  [] PO (Diet: Diet NPO  ADULT TUBE FEEDING; Nasogastric; Immune Enhancing; Continuous; 10; Yes; 15; Q 8 hours; 50; 30; Q 6 hours)    HOME MEDICATIONS RECONCILED: [] No  [] Yes    INSULIN DRIP:   [] No   [] Yes    CONSULTATION NEEDED:  [] No   [] Yes    FAMILY UPDATED:    [] No   [] Yes    TRANSFER OUT OF ICU:   [] No   [] Yes      Barbara Shea DO            3/9/2022, 1:39 PM      I personally saw, examined and provided care for the patient. Radiographs, labs and medication list were reviewed by me independently. Review of Residents documentation was conducted and revisions were made as appropriate. I agree with the above documented exam, problem list and plan of care. Patient with multiple comorbidities presents feeling unwell. Developed acute hypercapnic respiratory failure requiring intubation and significant shock. Went in A. fib with RVR with worsening and hypotension.     3/9: Pressors being weaned down heart rate now sinus in the 80s     Assessment:     1. Shock, multifactorial  2. Severe sepsis with bacteremia  3. A. fib with RVR  4.  Acute on chronic respiratory failure requiring mechanical ventilation  5. chronic lymphedema  6. Morbid obesity BMI 51  7. Chronic diastolic heart failure  8.  Medical noncompliance           Consultants: ID     · Full mechanical ventilation, repeat ABG adjust accordingly  · Continue IV fluid resuscitation  · Antibiotic with ceftriaxone  · Pressors for MAP over 65  · Complete amiodarone  · Solu-Cortef 100 every 8  · Start tube feeds     GI/DVT - SUP/Lovenox 40 every 12    CCT excluding procedures 38 minutes    Elsie Goodson DO

## 2022-03-09 NOTE — CONSULTS
3/9/2022  8:59 AM      Comprehensive Nutrition Assessment    Type and Reason for Visit:  Initial,Consult (Consult re: TF Ordering and Management)    Nutrition Recommendations/Plan: While pt intubated and EN needed for nutrition, recommend continue:    TF ORDER: Immune Enhancing TF (Pivot 1.5) to goal rate 50 ml/hr and 30 ml flush Q6 hr  This will provide: 1200 ml/d, 1800 juvencio, 113 g pro, 1031 ml total free water  This regimen will meet: 100% calorie/protein needs    Nutrition Assessment:  Pt admit from ECF 2/2 hyperglycemia, sepsis. Currently in ICU on pressors, intubated/sedated. PMH=DM, CHF, COPD, lymphedema. Pt has multiple wounds, including purulent drainage. Will initiate TF and monitor tolerance    Malnutrition Assessment:  Malnutrition Status: At risk for malnutrition    Context:  Acute Illness     Findings of the 6 clinical characteristics of malnutrition:  Energy Intake:  Mild decrease in energy intake (Comment) (NPO on admit/intubation before TF)  Weight Loss:  Unable to assess     Body Fat Loss:  No significant body fat loss     Muscle Mass Loss:  No significant muscle mass loss    Fluid Accumulation:  Unable to assess Generalized (multiple factors)   Strength:       Estimated Daily Nutrient Needs:  Energy (kcal):   (PSE 2010 x 80%); Weight Used for Energy Requirements:  Current     Protein (g):  110-120 (2-2.2 g/kg); Weight Used for Protein Requirements:  Ideal        Fluid (ml/day):  per Critical Care; Method Used for Fluid Requirements:  Other (Comment)      Nutrition Related Findings:  intubated/sedated, pressors( on 3/8), +I/O 4.2L, +1 gen edema, NGT to LIS, rotund abd w/hypo BS      Wounds:  Multiple,Skin Tears,Open Wounds       Current Nutrition Therapies:    Diet NPO  ADULT TUBE FEEDING; Nasogastric; Immune Enhancing; Continuous; 10; Yes; 15; Q 8 hours; 50; 30;  Q 6 hours    Anthropometric Measures:  · Height: 5' 4\" (162.6 cm)  · Current Body Weight: 298 lb (135.2 kg) (3/8 stated) · Admission Body Weight:      · Usual Body Weight: 305 lb 5.4 oz (138.5 kg) (actual wt x 7 mo ago)     · Ideal Body Weight: 120 lbs; % Ideal Body Weight 248.3 %   · BMI: 51.1  · BMI Categories: Obese Class 3 (BMI 40.0 or greater)       Nutrition Diagnosis:   · Inadequate oral intake related to impaired respiratory function as evidenced by NPO or clear liquid status due to medical condition,intubation,wounds      Nutrition Interventions:   Food and/or Nutrient Delivery:  Continue NPO,Start Tube Feeding (Immune Enhancing TF (Pivot 1.5) to meet needs and promote wound healing)  Nutrition Education/Counseling:  Education not indicated   Coordination of Nutrition Care:  Continue to monitor while inpatient    Goals:  Pt january EN at goal rate       Nutrition Monitoring and Evaluation:   Behavioral-Environmental Outcomes:  None Identified   Food/Nutrient Intake Outcomes:  Enteral Nutrition Intake/Tolerance  Physical Signs/Symptoms Outcomes:  GI Status,Biochemical Data,Hemodynamic Status,Fluid Status or Edema,Nutrition Focused Physical Findings,Skin,Weight     Discharge Planning:     Too soon to determine     Electronically signed by Walter Stone RD, CNSC, LD on 3/9/22 at 8:59 AM EST    Contact: [phone]

## 2022-03-09 NOTE — PATIENT CARE CONFERENCE
Intensive Care Daily Quality Rounding Checklist      ICU Team Members:  Dr. Collin Sherwood, residents, charge nurse, bedside nurse, clinical pharmacist and respiratory therapy    ICU Day #: 2    Intubation Date: 03/08/2022    Ventilator Day #: 2     Central Line Insertion Date: 03/08/2022        Day #: 2     Arterial Line Insertion Date: 03/08/2022      Day #: 2    Temporary Hemodialysis Catheter Insertion Date: N/A      Day # N/A    DVT Prophylaxis: Elequis needs reordered from home    GI Prophylaxis: Protonix IV    Hansen Catheter Insertion Date: 03/8/2022       Day #: 2      Continued need (if yes, reason documented and discussed with physician): Strict I and O in critical care patient    Skin Issues/ Wounds and ordered treatment discussed on rounds: Excoriation and tearing under pannus. Excoriation bilat groins and inner thighs. Skin is pruning between buttocks. Deep crevice in rt leg. Goals/ Plans for the Day: monitor labs and vitals. Restart lantus and start SSI off insulin drip. Increase Lovenox to 1 mg/kg.

## 2022-03-10 ENCOUNTER — APPOINTMENT (OUTPATIENT)
Dept: GENERAL RADIOLOGY | Age: 67
DRG: 870 | End: 2022-03-10
Payer: MEDICARE

## 2022-03-10 LAB
ANAEROBIC CULTURE: NORMAL
ANION GAP SERPL CALCULATED.3IONS-SCNC: 11 MMOL/L (ref 7–16)
ANISOCYTOSIS: ABNORMAL
B.E.: 4.2 MMOL/L (ref -3–3)
BASOPHILS ABSOLUTE: 0 E9/L (ref 0–0.2)
BASOPHILS RELATIVE PERCENT: 0 % (ref 0–2)
BUN BLDV-MCNC: 45 MG/DL (ref 6–23)
CALCIUM SERPL-MCNC: 8.4 MG/DL (ref 8.6–10.2)
CHLORIDE BLD-SCNC: 97 MMOL/L (ref 98–107)
CO2: 27 MMOL/L (ref 22–29)
COHB: 0.7 % (ref 0–1.5)
CREAT SERPL-MCNC: 1.1 MG/DL (ref 0.5–1)
CRITICAL: ABNORMAL
DATE ANALYZED: ABNORMAL
DATE OF COLLECTION: ABNORMAL
EKG ATRIAL RATE: 119 BPM
EKG ATRIAL RATE: 132 BPM
EKG ATRIAL RATE: 150 BPM
EKG P AXIS: 53 DEGREES
EKG P AXIS: 66 DEGREES
EKG P-R INTERVAL: 120 MS
EKG P-R INTERVAL: 122 MS
EKG Q-T INTERVAL: 290 MS
EKG Q-T INTERVAL: 320 MS
EKG Q-T INTERVAL: 338 MS
EKG QRS DURATION: 100 MS
EKG QRS DURATION: 102 MS
EKG QRS DURATION: 102 MS
EKG QTC CALCULATION (BAZETT): 458 MS
EKG QTC CALCULATION (BAZETT): 474 MS
EKG QTC CALCULATION (BAZETT): 503 MS
EKG R AXIS: 104 DEGREES
EKG R AXIS: 158 DEGREES
EKG R AXIS: 160 DEGREES
EKG T AXIS: 18 DEGREES
EKG T AXIS: 32 DEGREES
EKG T AXIS: 41 DEGREES
EKG VENTRICULAR RATE: 132 BPM
EKG VENTRICULAR RATE: 133 BPM
EKG VENTRICULAR RATE: 150 BPM
EOSINOPHILS ABSOLUTE: 0 E9/L (ref 0.05–0.5)
EOSINOPHILS RELATIVE PERCENT: 0 % (ref 0–6)
FIO2: 45 %
GFR AFRICAN AMERICAN: 60
GFR NON-AFRICAN AMERICAN: 49 ML/MIN/1.73
GLUCOSE BLD-MCNC: 243 MG/DL (ref 74–99)
HCO3: 29.1 MMOL/L (ref 22–26)
HCT VFR BLD CALC: 27.2 % (ref 34–48)
HEMOGLOBIN: 8.4 G/DL (ref 11.5–15.5)
HHB: 1.2 % (ref 0–5)
HYPOCHROMIA: ABNORMAL
LAB: ABNORMAL
LACTIC ACID: 1.6 MMOL/L (ref 0.5–2.2)
LACTIC ACID: 1.8 MMOL/L (ref 0.5–2.2)
LACTIC ACID: 2.6 MMOL/L (ref 0.5–2.2)
LYMPHOCYTES ABSOLUTE: 0.94 E9/L (ref 1.5–4)
LYMPHOCYTES RELATIVE PERCENT: 5.3 % (ref 20–42)
Lab: ABNORMAL
MAGNESIUM: 2.1 MG/DL (ref 1.6–2.6)
MCH RBC QN AUTO: 25.3 PG (ref 26–35)
MCHC RBC AUTO-ENTMCNC: 30.9 % (ref 32–34.5)
MCV RBC AUTO: 81.9 FL (ref 80–99.9)
METAMYELOCYTES RELATIVE PERCENT: 2.6 % (ref 0–1)
METER GLUCOSE: 230 MG/DL (ref 74–99)
METER GLUCOSE: 231 MG/DL (ref 74–99)
METER GLUCOSE: 274 MG/DL (ref 74–99)
METER GLUCOSE: 306 MG/DL (ref 74–99)
METER GLUCOSE: 308 MG/DL (ref 74–99)
METHB: 0.3 % (ref 0–1.5)
MODE: AC
MONOCYTES ABSOLUTE: 0.56 E9/L (ref 0.1–0.95)
MONOCYTES RELATIVE PERCENT: 2.6 % (ref 2–12)
NEUTROPHILS ABSOLUTE: 17.3 E9/L (ref 1.8–7.3)
NEUTROPHILS RELATIVE PERCENT: 89.5 % (ref 43–80)
NUCLEATED RED BLOOD CELLS: 0 /100 WBC
O2 CONTENT: 12.8 ML/DL
O2 SATURATION: 98.8 % (ref 92–98.5)
O2HB: 97.8 % (ref 94–97)
OPERATOR ID: 516
ORGANISM: ABNORMAL
OVALOCYTES: ABNORMAL
PATIENT TEMP: 37 C
PCO2: 45.4 MMHG (ref 35–45)
PDW BLD-RTO: 16 FL (ref 11.5–15)
PEEP/CPAP: 8 CMH2O
PFO2: 2.99 MMHG/%
PH BLOOD GAS: 7.42 (ref 7.35–7.45)
PHOSPHORUS: 4 MG/DL (ref 2.5–4.5)
PLATELET # BLD: 179 E9/L (ref 130–450)
PMV BLD AUTO: 10.1 FL (ref 7–12)
PO2: 134.7 MMHG (ref 75–100)
POIKILOCYTES: ABNORMAL
POTASSIUM SERPL-SCNC: 3.8 MMOL/L (ref 3.5–5)
RBC # BLD: 3.32 E12/L (ref 3.5–5.5)
RR MECHANICAL: 18 B/MIN
SODIUM BLD-SCNC: 135 MMOL/L (ref 132–146)
SOURCE, BLOOD GAS: ABNORMAL
THB: 9.1 G/DL (ref 11.5–16.5)
TIME ANALYZED: 537
URINE CULTURE, ROUTINE: NORMAL
VT MECHANICAL: 420 ML
WBC # BLD: 18.8 E9/L (ref 4.5–11.5)

## 2022-03-10 PROCEDURE — 93010 ELECTROCARDIOGRAM REPORT: CPT | Performed by: INTERNAL MEDICINE

## 2022-03-10 PROCEDURE — C9113 INJ PANTOPRAZOLE SODIUM, VIA: HCPCS | Performed by: INTERNAL MEDICINE

## 2022-03-10 PROCEDURE — 2500000003 HC RX 250 WO HCPCS: Performed by: STUDENT IN AN ORGANIZED HEALTH CARE EDUCATION/TRAINING PROGRAM

## 2022-03-10 PROCEDURE — 6370000000 HC RX 637 (ALT 250 FOR IP): Performed by: INTERNAL MEDICINE

## 2022-03-10 PROCEDURE — 6370000000 HC RX 637 (ALT 250 FOR IP): Performed by: STUDENT IN AN ORGANIZED HEALTH CARE EDUCATION/TRAINING PROGRAM

## 2022-03-10 PROCEDURE — 2500000003 HC RX 250 WO HCPCS: Performed by: INTERNAL MEDICINE

## 2022-03-10 PROCEDURE — 83735 ASSAY OF MAGNESIUM: CPT

## 2022-03-10 PROCEDURE — 82962 GLUCOSE BLOOD TEST: CPT

## 2022-03-10 PROCEDURE — 94003 VENT MGMT INPAT SUBQ DAY: CPT

## 2022-03-10 PROCEDURE — 2580000003 HC RX 258: Performed by: STUDENT IN AN ORGANIZED HEALTH CARE EDUCATION/TRAINING PROGRAM

## 2022-03-10 PROCEDURE — 36415 COLL VENOUS BLD VENIPUNCTURE: CPT

## 2022-03-10 PROCEDURE — 84100 ASSAY OF PHOSPHORUS: CPT

## 2022-03-10 PROCEDURE — 83605 ASSAY OF LACTIC ACID: CPT

## 2022-03-10 PROCEDURE — 2580000003 HC RX 258: Performed by: INTERNAL MEDICINE

## 2022-03-10 PROCEDURE — 6360000002 HC RX W HCPCS: Performed by: INTERNAL MEDICINE

## 2022-03-10 PROCEDURE — 82805 BLOOD GASES W/O2 SATURATION: CPT

## 2022-03-10 PROCEDURE — 6360000002 HC RX W HCPCS: Performed by: STUDENT IN AN ORGANIZED HEALTH CARE EDUCATION/TRAINING PROGRAM

## 2022-03-10 PROCEDURE — 80048 BASIC METABOLIC PNL TOTAL CA: CPT

## 2022-03-10 PROCEDURE — 85025 COMPLETE CBC W/AUTO DIFF WBC: CPT

## 2022-03-10 PROCEDURE — 2580000003 HC RX 258: Performed by: SPECIALIST

## 2022-03-10 PROCEDURE — 6360000002 HC RX W HCPCS: Performed by: SPECIALIST

## 2022-03-10 PROCEDURE — 2000000000 HC ICU R&B

## 2022-03-10 PROCEDURE — 37799 UNLISTED PX VASCULAR SURGERY: CPT

## 2022-03-10 PROCEDURE — 71045 X-RAY EXAM CHEST 1 VIEW: CPT

## 2022-03-10 RX ORDER — INSULIN GLARGINE 100 [IU]/ML
30 INJECTION, SOLUTION SUBCUTANEOUS 2 TIMES DAILY
Status: DISCONTINUED | OUTPATIENT
Start: 2022-03-10 | End: 2022-03-11

## 2022-03-10 RX ORDER — POTASSIUM CHLORIDE 29.8 MG/ML
20 INJECTION INTRAVENOUS ONCE
Status: COMPLETED | OUTPATIENT
Start: 2022-03-10 | End: 2022-03-10

## 2022-03-10 RX ORDER — INSULIN GLARGINE 100 [IU]/ML
20 INJECTION, SOLUTION SUBCUTANEOUS 2 TIMES DAILY
Status: DISCONTINUED | OUTPATIENT
Start: 2022-03-10 | End: 2022-03-10

## 2022-03-10 RX ADMIN — MICONAZOLE NITRATE: 20 POWDER TOPICAL at 08:01

## 2022-03-10 RX ADMIN — SODIUM CHLORIDE, PRESERVATIVE FREE 10 ML: 5 INJECTION INTRAVENOUS at 20:41

## 2022-03-10 RX ADMIN — HYDROCORTISONE SODIUM SUCCINATE 100 MG: 100 INJECTION, POWDER, FOR SOLUTION INTRAMUSCULAR; INTRAVENOUS at 06:19

## 2022-03-10 RX ADMIN — DEXMEDETOMIDINE HYDROCHLORIDE 0.3 MCG/KG/HR: 100 INJECTION, SOLUTION INTRAVENOUS at 19:06

## 2022-03-10 RX ADMIN — POTASSIUM CHLORIDE 20 MEQ: 29.8 INJECTION, SOLUTION INTRAVENOUS at 10:55

## 2022-03-10 RX ADMIN — MICONAZOLE NITRATE: 20 POWDER TOPICAL at 20:41

## 2022-03-10 RX ADMIN — MIDAZOLAM HYDROCHLORIDE 2 MG: 1 INJECTION, SOLUTION INTRAMUSCULAR; INTRAVENOUS at 04:58

## 2022-03-10 RX ADMIN — MIDAZOLAM HYDROCHLORIDE 2 MG: 1 INJECTION, SOLUTION INTRAMUSCULAR; INTRAVENOUS at 07:58

## 2022-03-10 RX ADMIN — PANTOPRAZOLE SODIUM 40 MG: 40 INJECTION, POWDER, FOR SOLUTION INTRAVENOUS at 08:01

## 2022-03-10 RX ADMIN — Medication 200 MCG/HR: at 05:06

## 2022-03-10 RX ADMIN — VASOPRESSIN 0.04 UNITS/MIN: 20 INJECTION PARENTERAL at 23:23

## 2022-03-10 RX ADMIN — Medication 200 MCG/HR: at 18:13

## 2022-03-10 RX ADMIN — INSULIN LISPRO 6 UNITS: 100 INJECTION, SOLUTION INTRAVENOUS; SUBCUTANEOUS at 20:41

## 2022-03-10 RX ADMIN — Medication 8 MG/HR: at 23:20

## 2022-03-10 RX ADMIN — VASOPRESSIN 0.04 UNITS/MIN: 20 INJECTION PARENTERAL at 06:18

## 2022-03-10 RX ADMIN — CHLORHEXIDINE GLUCONATE 0.12% ORAL RINSE 15 ML: 1.2 LIQUID ORAL at 08:01

## 2022-03-10 RX ADMIN — ACETAMINOPHEN 650 MG: 325 TABLET ORAL at 16:07

## 2022-03-10 RX ADMIN — INSULIN GLARGINE 20 UNITS: 100 INJECTION, SOLUTION SUBCUTANEOUS at 10:04

## 2022-03-10 RX ADMIN — SODIUM CHLORIDE, PRESERVATIVE FREE 10 ML: 5 INJECTION INTRAVENOUS at 08:01

## 2022-03-10 RX ADMIN — INSULIN LISPRO 4 UNITS: 100 INJECTION, SOLUTION INTRAVENOUS; SUBCUTANEOUS at 04:41

## 2022-03-10 RX ADMIN — MIDAZOLAM HYDROCHLORIDE 2 MG: 1 INJECTION, SOLUTION INTRAMUSCULAR; INTRAVENOUS at 15:11

## 2022-03-10 RX ADMIN — ENOXAPARIN SODIUM 135 MG: 150 INJECTION SUBCUTANEOUS at 08:01

## 2022-03-10 RX ADMIN — INSULIN GLARGINE 30 UNITS: 100 INJECTION, SOLUTION SUBCUTANEOUS at 20:42

## 2022-03-10 RX ADMIN — INSULIN LISPRO 8 UNITS: 100 INJECTION, SOLUTION INTRAVENOUS; SUBCUTANEOUS at 16:13

## 2022-03-10 RX ADMIN — CHLORHEXIDINE GLUCONATE 0.12% ORAL RINSE 15 ML: 1.2 LIQUID ORAL at 20:41

## 2022-03-10 RX ADMIN — VASOPRESSIN 0.04 UNITS/MIN: 20 INJECTION PARENTERAL at 15:58

## 2022-03-10 RX ADMIN — INSULIN LISPRO 8 UNITS: 100 INJECTION, SOLUTION INTRAVENOUS; SUBCUTANEOUS at 11:55

## 2022-03-10 RX ADMIN — HYDROCORTISONE SODIUM SUCCINATE 100 MG: 100 INJECTION, POWDER, FOR SOLUTION INTRAMUSCULAR; INTRAVENOUS at 23:11

## 2022-03-10 RX ADMIN — Medication 200 MCG/HR: at 11:36

## 2022-03-10 RX ADMIN — INSULIN LISPRO 6 UNITS: 100 INJECTION, SOLUTION INTRAVENOUS; SUBCUTANEOUS at 07:56

## 2022-03-10 RX ADMIN — HYDROCORTISONE SODIUM SUCCINATE 100 MG: 100 INJECTION, POWDER, FOR SOLUTION INTRAMUSCULAR; INTRAVENOUS at 15:59

## 2022-03-10 RX ADMIN — DEXMEDETOMIDINE HYDROCHLORIDE 0.3 MCG/KG/HR: 100 INJECTION, SOLUTION INTRAVENOUS at 10:25

## 2022-03-10 RX ADMIN — ENOXAPARIN SODIUM 135 MG: 150 INJECTION SUBCUTANEOUS at 20:41

## 2022-03-10 RX ADMIN — WATER 2000 MG: 1 INJECTION INTRAMUSCULAR; INTRAVENOUS; SUBCUTANEOUS at 12:41

## 2022-03-10 RX ADMIN — Medication 8 MG/HR: at 09:16

## 2022-03-10 ASSESSMENT — PULMONARY FUNCTION TESTS
PIF_VALUE: 53
PIF_VALUE: 32
PIF_VALUE: 32
PIF_VALUE: 31
PIF_VALUE: 31
PIF_VALUE: 32
PIF_VALUE: 34
PIF_VALUE: 31
PIF_VALUE: 32
PIF_VALUE: 31
PIF_VALUE: 32
PIF_VALUE: 52
PIF_VALUE: 32
PIF_VALUE: 31
PIF_VALUE: 32
PIF_VALUE: 33
PIF_VALUE: 31
PIF_VALUE: 31
PIF_VALUE: 32
PIF_VALUE: 43
PIF_VALUE: 32
PIF_VALUE: 31
PIF_VALUE: 32
PIF_VALUE: 32
PIF_VALUE: 31
PIF_VALUE: 31
PIF_VALUE: 33
PIF_VALUE: 32
PIF_VALUE: 31

## 2022-03-10 ASSESSMENT — PAIN SCALES - GENERAL
PAINLEVEL_OUTOF10: 0

## 2022-03-10 NOTE — PLAN OF CARE
Problem: Non-Violent Restraints  Goal: Removal from restraints as soon as assessed to be safe  3/10/2022 0551 by Brigitte Nolasco RN  Outcome: Not Met This Shift  3/9/2022 1733 by Liza Gautam RN  Outcome: Not Met This Shift  Goal: No harm/injury to patient while restraints in use  3/10/2022 0551 by Brigitte Nolasco RN  Outcome: Met This Shift  3/9/2022 1733 by Liza Gautam RN  Outcome: Met This Shift  Goal: Patient's dignity will be maintained  3/10/2022 0551 by Brigitte Nolasco RN  Outcome: Met This Shift  3/9/2022 1733 by Liza Gautam RN  Outcome: Met This Shift     Problem: Discharge Planning:  Goal: Participates in care planning  Description: Participates in care planning  Outcome: Not Met This Shift  Goal: Discharged to appropriate level of care  Description: Discharged to appropriate level of care  Outcome: Not Met This Shift     Problem: Airway Clearance - Ineffective:  Goal: Ability to maintain a clear airway will improve  Description: Ability to maintain a clear airway will improve  3/10/2022 0551 by Brigitte Nolasco RN  Outcome: Not Met This Shift  3/9/2022 1733 by Liza Gautam RN  Outcome: Not Met This Shift     Problem: Anxiety/Stress:  Goal: Level of anxiety will decrease  Description: Level of anxiety will decrease  3/10/2022 0551 by Birgitte Nolasco RN  Outcome: Not Met This Shift  3/9/2022 1733 by Liza Gautam RN  Outcome: Ongoing     Problem: Aspiration:  Goal: Absence of aspiration  Description: Absence of aspiration  Outcome: Met This Shift     Problem:  Bowel Function - Altered:  Goal: Bowel elimination is within specified parameters  Description: Bowel elimination is within specified parameters  Outcome: Not Met This Shift     Problem: Cardiac Output - Decreased:  Goal: Hemodynamic stability will improve  Description: Hemodynamic stability will improve  Outcome: Ongoing     Problem: Fluid Volume - Imbalance:  Goal: Absence of imbalanced fluid volume signs and symptoms  Description: Absence of imbalanced fluid volume signs and symptoms  3/10/2022 0551 by Jenifer Pérez RN  Outcome: Ongoing  3/9/2022 1733 by Estelita Garcia RN  Outcome: Ongoing     Problem: Gas Exchange - Impaired:  Goal: Levels of oxygenation will improve  Description: Levels of oxygenation will improve  Outcome: Ongoing     Problem: Mental Status - Impaired:  Goal: Mental status will be restored to baseline  Description: Mental status will be restored to baseline  Outcome: Not Met This Shift     Problem: Nutrition Deficit:  Goal: Ability to achieve adequate nutritional intake will improve  Description: Ability to achieve adequate nutritional intake will improve  Outcome: Ongoing     Problem: Pain:  Goal: Pain level will decrease  Description: Pain level will decrease  3/10/2022 0551 by Jenifer Pérez RN  Outcome: Ongoing  3/9/2022 1733 by Estelita Garcia RN  Outcome: Ongoing  Goal: Recognizes and communicates pain  Description: Recognizes and communicates pain  Outcome: Ongoing  Goal: Control of acute pain  Description: Control of acute pain  Outcome: Ongoing  Goal: Control of chronic pain  Description: Control of chronic pain  Outcome: Ongoing     Problem: Serum Glucose Level - Abnormal:  Goal: Ability to maintain appropriate glucose levels will improve to within specified parameters  Description: Ability to maintain appropriate glucose levels will improve to within specified parameters  Outcome: Ongoing     Problem: Skin Integrity - Impaired:  Goal: Will show no infection signs and symptoms  Description: Will show no infection signs and symptoms  3/10/2022 0551 by Jenifer Pérez RN  Outcome: Not Met This Shift  3/9/2022 1733 by Estelita Garcia RN  Outcome: Ongoing  Goal: Absence of new skin breakdown  Description: Absence of new skin breakdown  3/10/2022 0551 by Jenifer Pérez RN  Outcome: Met This Shift  3/9/2022 1733 by Estelita Garcia RN  Outcome: Met This Shift     Problem: Sleep Pattern Disturbance:  Goal: Appears well-rested  Description: Appears well-rested  Outcome: Met This Shift     Problem: Tissue Perfusion, Altered:  Goal: Circulatory function within specified parameters  Description: Circulatory function within specified parameters  Outcome: Ongoing     Problem: Tissue Perfusion - Cardiopulmonary, Altered:  Goal: Hemodynamic stability will improve  Description: Hemodynamic stability will improve  Outcome: Ongoing     Problem: Skin Integrity:  Goal: Will show no infection signs and symptoms  Description: Will show no infection signs and symptoms  Outcome: Ongoing  Goal: Absence of new skin breakdown  Description: Absence of new skin breakdown  Outcome: Met This Shift     Problem: Falls - Risk of:  Goal: Will remain free from falls  Description: Will remain free from falls  Outcome: Met This Shift  Goal: Absence of physical injury  Description: Absence of physical injury  Outcome: Met This Shift

## 2022-03-10 NOTE — PATIENT CARE CONFERENCE
Intensive Care Daily Quality Rounding Checklist        ICU Team Members:  Dr. Susanna Mathur, residents, charge nurse, bedside nurse, clinical pharmacist and respiratory therapy     ICU Day #: 3     Intubation Date: 03/08/2022     Ventilator Day #: 3      Central Line Insertion Date: 03/08/2022                                                    Day #: 3      Arterial Line Insertion Date: 03/08/2022                             Day #: 3     Temporary Hemodialysis Catheter Insertion Date: N/A                             Day # N/A     DVT Prophylaxis: Lovenox    GI Prophylaxis: Protonix IV     Hansen Catheter Insertion Date: 03/8/2022                                        Day #: 3                             Continued need (if yes, reason documented and discussed with physician): Strict I and O in critical care patient     Skin Issues/ Wounds and ordered treatment discussed on rounds: Excoriation and tearing under pannus. Excoriation bilat groins and inner thighs. Skin is pruning between buttocks. Deep crevice in rt leg.     Goals/ Plans for the Day: monitor labs and vitals. Titrate pressors as tolerated.  Add precedex, Lantus bid, replace electrolytes

## 2022-03-10 NOTE — PROGRESS NOTES
[address] [place] [address] [facility]  SUZANNE  Progress Note    SUBJECTIVE:  Chief Complaint   Patient presents with    Cough     started a couple days ago    Hyperglycemia      per EMS    Chills    Nausea     Zofran given per EMS     The patient remains intubated and sedated. She is still on 2 vasopressors. She is off amiodarone drip. Review of systems:  A 10 point review of systems was done. As stated above, otherwise negative. Medications:   insulin glargine  20 Units SubCUTAneous BID    insulin lispro  0-12 Units SubCUTAneous Q4H    sodium chloride flush  5-40 mL IntraVENous BID    enoxaparin  1 mg/kg SubCUTAneous BID    fentanNYL  50 mcg IntraVENous Once    midazolam  2 mg IntraVENous Once    cefTRIAXone (ROCEPHIN) IV  2,000 mg IntraVENous Q24H    chlorhexidine  15 mL Mouth/Throat BID    pantoprazole  40 mg IntraVENous Daily    hydrocortisone sodium succinate PF  100 mg IntraVENous Q8H    miconazole   Topical BID      dexmedetomidine (PRECEDEX) IV infusion 0.3 mcg/kg/hr (03/10/22 1025)    fentaNYL 5 mcg/ml in 0.9%  ml infusion 200 mcg/hr (03/10/22 0618)    vasopressin (Septic Shock) infusion 0.04 Units/min (03/10/22 0618)    phenylephrine (DARLYN-SYNEPHRINE) 50mg/250mL infusion 25 mcg/min (03/10/22 0831)    dextrose      midazolam 8 mg/hr (03/10/22 0916)    sodium chloride 20 mL/hr at 03/10/22 0618     dextrose bolus (hypoglycemia) **OR** dextrose bolus (hypoglycemia), glucose, glucagon (rDNA), dextrose, midazolam, acetaminophen, acetaminophen    OBJECTIVE:  /62   Pulse 70   Temp 100 °F (37.8 °C) (Bladder)   Resp 19   Ht 5' 4\" (1.626 m)   Wt 298 lb (135.2 kg)   LMP  (LMP Unknown)   SpO2 100%   BMI 51.15 kg/m²   Temp  Av.8 °F (37.7 °C)  Min: 99.5 °F (37.5 °C)  Max: 100 °F (37.8 °C)  Constitutional: The patient is lying even in the ICU. She is intubated and sedated. FiO2 40%. PEEP 8. Skin: Warm and dry. No rashes were noted.    HEENT: Eyes show round, and reactive pupils. No jaundice. Moist mucous membranes, no ulcerations, no thrush. ETT. NGT  Neck: Supple to movements. No lymphadenopathy. Chest: No respiratory distress. No crackles. Cardiovascular: Heart sounds rhythmic and regular. Abdomen: Positive bowel heart sounds rhythmic and regular. Sounds to auscultation. Benign to palpation. Large and pendulous. Intertrigo in the groin area. Extremities: Bilateral lower extremity lymphedema with lymphoceles. Minimal erythema on the right leg. Lines: Right radial arterial line 3/8/2022. Left IJ TLC 3/8/2022.     Laboratory and Tests Review:  Lab Results   Component Value Date    WBC 18.8 (H) 03/10/2022    WBC 22.5 (H) 03/09/2022    WBC 7.3 03/08/2022    HGB 8.4 (L) 03/10/2022    HCT 27.2 (L) 03/10/2022    MCV 81.9 03/10/2022     03/10/2022     Lab Results   Component Value Date    NEUTROABS 17.30 (H) 03/10/2022    NEUTROABS 19.64 (H) 03/09/2022    NEUTROABS 6.61 03/08/2022     Lab Results   Component Value Date    CRP 13.7 (H) 03/08/2022    CRP 5.3 (H) 07/05/2021    CRP 14.0 (H) 05/29/2020     No results found for: CRP  Lab Results   Component Value Date    SEDRATE 30 (H) 03/08/2022    SEDRATE 41 (H) 07/05/2021    SEDRATE 75 (H) 05/29/2020     Lab Results   Component Value Date    ALT 19 03/08/2022    AST 34 (H) 03/08/2022    ALKPHOS 103 03/08/2022    BILITOT 0.3 03/08/2022     Lab Results   Component Value Date     03/10/2022    K 3.8 03/10/2022    K 5.7 03/08/2022    CL 97 03/10/2022    CO2 27 03/10/2022    BUN 45 03/10/2022    CREATININE 1.1 03/10/2022    CREATININE 1.3 03/09/2022    CREATININE 1.3 03/09/2022    GFRAA 60 03/10/2022    LABGLOM 49 03/10/2022    GLUCOSE 243 03/10/2022    GLUCOSE 181 12/16/2011    PROT 7.2 03/08/2022    LABALBU 3.8 03/08/2022    LABALBU 4.5 11/02/2011    CALCIUM 8.4 03/10/2022    BILITOT 0.3 03/08/2022    ALKPHOS 103 03/08/2022    AST 34 03/08/2022    ALT 19 03/08/2022     Radiology:      Microbiology: Rapid SARS-CoV-2: Negative  Respiratory panel: Negative  Blood cultures 3/8/2022: Group G Streptococcus in 4 of 4 bottles  Blood cultures 3/9/2022: Negative so far  Leg wound culture 3/8/2022: Proteus species  Nares screen MRSA: Positive    ASSESSMENT:  · Probable cellulitis right lower extremity  · Group G Streptococcus septicemia  · Sepsis with septic shock  · Acute respiratory failure   · Leukocytosis  · Lactic acidosis, resolved    PLAN:  · Continue Ceftriaxone, day 3 of antibiotics  · Check repeat blood cultures  · We will follow with you  · Repeat inflammatory markers tomorrow    Spoke with Unknown MD Leanne  11:04 AM  3/10/2022

## 2022-03-10 NOTE — PLAN OF CARE
Problem: Non-Violent Restraints  Goal: Removal from restraints as soon as assessed to be safe  3/10/2022 1152 by Clara Kern RN  Outcome: Ongoing  3/10/2022 0723 by Clara Kern RN  Outcome: Ongoing  3/10/2022 0551 by Dyan Lombardi RN  Outcome: Not Met This Shift  Goal: No harm/injury to patient while restraints in use  3/10/2022 1152 by Clara Kern RN  Outcome: Ongoing  3/10/2022 0723 by Clara Kern RN  Outcome: Ongoing  3/10/2022 0551 by Dyan Lombardi RN  Outcome: Met This Shift  Goal: Patient's dignity will be maintained  3/10/2022 1152 by Clara Kern RN  Outcome: Ongoing  3/10/2022 0723 by Clara Kern RN  Outcome: Ongoing  3/10/2022 0551 by Dyan Lombardi RN  Outcome: Met This Shift     Problem: Discharge Planning:  Goal: Participates in care planning  Description: Participates in care planning  3/10/2022 1152 by Clara Kern RN  Outcome: Ongoing  3/10/2022 0723 by Clara Kern RN  Outcome: Ongoing  3/10/2022 0551 by Dyan Lombardi RN  Outcome: Not Met This Shift  Goal: Discharged to appropriate level of care  Description: Discharged to appropriate level of care  3/10/2022 1152 by Clara Kern RN  Outcome: Ongoing  3/10/2022 0723 by Clara Kern RN  Outcome: Ongoing  3/10/2022 0551 by Dyan Lombardi RN  Outcome: Not Met This Shift     Problem: Airway Clearance - Ineffective:  Goal: Ability to maintain a clear airway will improve  Description: Ability to maintain a clear airway will improve  3/10/2022 1152 by Clara Kern RN  Outcome: Ongoing  3/10/2022 0723 by Clara Kern RN  Outcome: Ongoing  3/10/2022 0551 by Dyan Lombardi RN  Outcome: Not Met This Shift     Problem: Anxiety/Stress:  Goal: Level of anxiety will decrease  Description: Level of anxiety will decrease  3/10/2022 1152 by Clara Kern RN  Outcome: Ongoing  3/10/2022 0723 by Clara Kern, RN  Outcome: Ongoing  3/10/2022 0551 by Dyan Lombardi RN  Outcome: Not Met This Shift     Problem: Aspiration:  Goal: Absence of aspiration  Description: Absence of aspiration  3/10/2022 1152 by Severo Heller, RN  Outcome: Ongoing  3/10/2022 0723 by Severo Heller, RN  Outcome: Ongoing  3/10/2022 0551 by Lety Livingston RN  Outcome: Met This Shift     Problem:  Bowel Function - Altered:  Goal: Bowel elimination is within specified parameters  Description: Bowel elimination is within specified parameters  3/10/2022 1152 by Severo Heller, RN  Outcome: Ongoing  3/10/2022 0723 by Severo Heller, RN  Outcome: Ongoing  3/10/2022 0551 by Lety Livingston RN  Outcome: Not Met This Shift     Problem: Cardiac Output - Decreased:  Goal: Hemodynamic stability will improve  Description: Hemodynamic stability will improve  3/10/2022 1152 by Severo Heller, RN  Outcome: Ongoing  3/10/2022 0723 by Severo Heller, RN  Outcome: Ongoing  3/10/2022 0551 by Lety Livingston RN  Outcome: Ongoing     Problem: Fluid Volume - Imbalance:  Goal: Absence of imbalanced fluid volume signs and symptoms  Description: Absence of imbalanced fluid volume signs and symptoms  3/10/2022 1152 by Severo Heller, RN  Outcome: Ongoing  3/10/2022 0723 by Severo Heller, RN  Outcome: Ongoing  3/10/2022 0551 by Lety Livingston RN  Outcome: Ongoing     Problem: Gas Exchange - Impaired:  Goal: Levels of oxygenation will improve  Description: Levels of oxygenation will improve  3/10/2022 1152 by Severo Heller, RN  Outcome: Ongoing  3/10/2022 0723 by Severo Heller, RN  Outcome: Ongoing  3/10/2022 0551 by Lety Livingston RN  Outcome: Ongoing     Problem: Mental Status - Impaired:  Goal: Mental status will be restored to baseline  Description: Mental status will be restored to baseline  3/10/2022 1152 by Severo Heller, RN  Outcome: Ongoing  3/10/2022 0723 by Severo Heller, RN  Outcome: Ongoing  3/10/2022 0551 by Lety Livingston RN  Outcome: Not Met This Shift     Problem: Nutrition Deficit:  Goal: Ability to achieve adequate nutritional intake will improve  Description: Ability to achieve adequate nutritional intake will improve  3/10/2022 1152 by Dre Del Toro RN  Outcome: Ongoing  3/10/2022 0723 by Dre Del Toro RN  Outcome: Ongoing  3/10/2022 0551 by Giacomo Green RN  Outcome: Ongoing     Problem: Pain:  Description: Pain management should include both nonpharmacologic and pharmacologic interventions.   Goal: Pain level will decrease  Description: Pain level will decrease  3/10/2022 1152 by Dre Del Toro RN  Outcome: Ongoing  3/10/2022 0723 by Dre Del Toro RN  Outcome: Ongoing  3/10/2022 0551 by Giacomo Green RN  Outcome: Ongoing  Goal: Recognizes and communicates pain  Description: Recognizes and communicates pain  3/10/2022 1152 by Dre Del Toro RN  Outcome: Ongoing  3/10/2022 0723 by Dre Del Toro RN  Outcome: Ongoing  3/10/2022 0551 by Giacomo Green RN  Outcome: Ongoing  Goal: Control of acute pain  Description: Control of acute pain  3/10/2022 1152 by Dre Del Toro RN  Outcome: Ongoing  3/10/2022 0723 by Dre Del Toro RN  Outcome: Ongoing  3/10/2022 0551 by Giacomo Green RN  Outcome: Ongoing  Goal: Control of chronic pain  Description: Control of chronic pain  3/10/2022 1152 by Dre Del Toro RN  Outcome: Ongoing  3/10/2022 0723 by Dre Del Toro RN  Outcome: Ongoing  3/10/2022 0551 by Giacomo Green RN  Outcome: Ongoing     Problem: Serum Glucose Level - Abnormal:  Goal: Ability to maintain appropriate glucose levels will improve to within specified parameters  Description: Ability to maintain appropriate glucose levels will improve to within specified parameters  3/10/2022 1152 by Dre Del Toro RN  Outcome: Ongoing  3/10/2022 0723 by Dre Del Toro RN  Outcome: Ongoing  3/10/2022 0551 by Giacomo Green RN  Outcome: Ongoing     Problem: Skin Integrity - Impaired:  Goal: Will show no infection signs and symptoms  Description: Will show no infection signs and symptoms  3/10/2022 1152 by Courtney Dominguez RN  Outcome: Ongoing  3/10/2022 0723 by Courtney Dominguez RN  Outcome: Ongoing  3/10/2022 0551 by Shahram Zhang RN  Outcome: Not Met This Shift  Goal: Absence of new skin breakdown  Description: Absence of new skin breakdown  3/10/2022 1152 by Courtney Dominguez RN  Outcome: Ongoing  3/10/2022 0723 by Courtney Dominguez RN  Outcome: Ongoing  3/10/2022 0551 by Shahram Zhang RN  Outcome: Met This Shift     Problem: Sleep Pattern Disturbance:  Goal: Appears well-rested  Description: Appears well-rested  3/10/2022 1152 by Courtney Dominguez RN  Outcome: Ongoing  3/10/2022 0723 by Courtney Dominguez RN  Outcome: Ongoing  3/10/2022 0551 by Shahram Zhang RN  Outcome: Met This Shift     Problem: Tissue Perfusion, Altered:  Goal: Circulatory function within specified parameters  Description: Circulatory function within specified parameters  3/10/2022 1152 by Courtney Dominguez RN  Outcome: Ongoing  3/10/2022 0723 by Courtney Dominguez RN  Outcome: Ongoing  3/10/2022 0551 by Shahram Zhang RN  Outcome: Ongoing     Problem: Tissue Perfusion - Cardiopulmonary, Altered:  Goal: Absence of angina  Description: Absence of angina  3/10/2022 1152 by Courtney Dominguez RN  Outcome: Ongoing  3/10/2022 0723 by Courtney Dominguez RN  Outcome: Ongoing  Goal: Hemodynamic stability will improve  Description: Hemodynamic stability will improve  3/10/2022 1152 by Courtney Dominguez RN  Outcome: Ongoing  3/10/2022 0723 by Courtney Dominguez RN  Outcome: Ongoing  3/10/2022 0551 by Shahram Zhang RN  Outcome: Ongoing     Problem: Skin Integrity:  Goal: Will show no infection signs and symptoms  Description: Will show no infection signs and symptoms  3/10/2022 1152 by Courtney Dominguez RN  Outcome: Ongoing  3/10/2022 0723 by Courtney Dominguez RN  Outcome: Ongoing  3/10/2022 0551 by Shahram Zhang RN  Outcome: Ongoing  Goal: Absence of new skin breakdown  Description: Absence of new skin breakdown  3/10/2022 1152 by Malathi Roberson RN  Outcome: Ongoing  3/10/2022 0723 by Malathi Roberson RN  Outcome: Ongoing  3/10/2022 0551 by Jerry Calero RN  Outcome: Met This Shift     Problem: Falls - Risk of:  Goal: Will remain free from falls  Description: Will remain free from falls  3/10/2022 1152 by Malathi Roberson RN  Outcome: Ongoing  3/10/2022 0723 by Malathi Roberson RN  Outcome: Ongoing  3/10/2022 0551 by Jerry Calero RN  Outcome: Met This Shift  Goal: Absence of physical injury  Description: Absence of physical injury  3/10/2022 1152 by Malathi Roberson RN  Outcome: Ongoing  3/10/2022 0723 by Malathi Roberson RN  Outcome: Ongoing  3/10/2022 0551 by Jerry Calero RN  Outcome: Met This Shift

## 2022-03-10 NOTE — PROGRESS NOTES
Critical Care Team - Daily Progress Note         Date and time: 3/10/2022 11:10 AM  Patient's name:  [place] [address] Record Number: [id number]  Patient's account/billing number: [de-identified]  Patient's YOB: 1955  Age: 74 y.o. Date of Admission: 3/8/2022  4:28 AM  Length of stay during current admission: 2      Primary Care Physician: Nelda Flowers DO  ICU Attending Physician: Teodora Goldberg, DO    Code Status: Prior    Reason for ICU admission: Respiratory failure, shock      SUBJECTIVE     Pressors decreased. Intermittently will wake up agitated. Tolerating tube feeds. OBJECTIVE     Vital signs:   height is 5' 4\" (1.626 m) and weight is 298 lb (135.2 kg). Her bladder temperature is 100 °F (37.8 °C). Her blood pressure is 131/62 and her pulse is 70. Her respiration is 17 and oxygen saturation is 100%. I/O last 3 completed shifts: In: 5623 [I.V.:5170.1; NG/GT:229; IV Piggyback:223.9]  Out: 4076 [Urine:2020; Emesis/NG output:1000]      PHYSICAL EXAM:    Physical Exam  Constitutional:       Appearance: She is morbidly obese. She is ill-appearing. Interventions: She is sedated and intubated. HENT:      Head: Normocephalic and atraumatic. Eyes:      Conjunctiva/sclera: Conjunctivae normal.   Neck:      Trachea: No tracheal deviation. Cardiovascular:      Rate and Rhythm: Normal rate and regular rhythm. Heart sounds: Normal heart sounds. Pulmonary:      Effort: Pulmonary effort is normal. She is intubated. Breath sounds: Decreased breath sounds present. Abdominal:      General: Bowel sounds are normal.      Palpations: Abdomen is soft. Tenderness: There is no abdominal tenderness. Musculoskeletal:      Cervical back: Neck supple. Right lower leg: Edema present. Left lower leg: Edema present. Lymphadenopathy:      Cervical: No cervical adenopathy. Skin:     General: Skin is warm and dry. Findings: No rash.       Comments: +bl lower extremity lymphedema chronic skin changes with right leg fissure erythma   Neurological:      General: No focal deficit present. Psychiatric:         Behavior: Behavior normal.           MEDICATIONS:  Scheduled Meds:   insulin glargine  20 Units SubCUTAneous BID    insulin lispro  0-12 Units SubCUTAneous Q4H    sodium chloride flush  5-40 mL IntraVENous BID    enoxaparin  1 mg/kg SubCUTAneous BID    fentanNYL  50 mcg IntraVENous Once    midazolam  2 mg IntraVENous Once    cefTRIAXone (ROCEPHIN) IV  2,000 mg IntraVENous Q24H    chlorhexidine  15 mL Mouth/Throat BID    pantoprazole  40 mg IntraVENous Daily    hydrocortisone sodium succinate PF  100 mg IntraVENous Q8H    miconazole   Topical BID     Continuous Infusions:   dexmedetomidine (PRECEDEX) IV infusion 0.3 mcg/kg/hr (03/10/22 1025)    fentaNYL 5 mcg/ml in 0.9%  ml infusion 200 mcg/hr (03/10/22 0618)    vasopressin (Septic Shock) infusion 0.04 Units/min (03/10/22 0618)    phenylephrine (DARLYN-SYNEPHRINE) 50mg/250mL infusion 25 mcg/min (03/10/22 0831)    dextrose      midazolam 8 mg/hr (03/10/22 0916)    sodium chloride 20 mL/hr at 03/10/22 0618     PRN Meds:dextrose bolus (hypoglycemia) **OR** dextrose bolus (hypoglycemia), glucose, glucagon (rDNA), dextrose, midazolam, acetaminophen, acetaminophen    PERTINENT LAB RESULTS:  Labs reviewed. DIAGNOSTICS:  Imaging reviewed. ASSESMENT/PLAN     Assessment:     1. Shock, multifactorial  2. Severe sepsis with bacteremia, Streptococcus  3. Right lower extremity cellulitis  4. A. fib with RVR -now NSR  5. Acute on chronic respiratory failure requiring mechanical ventilation  6. chronic lymphedema  7. Morbid obesity BMI 51  8. Chronic diastolic heart failure  9.  Medical noncompliance        Consultants: ID     · Full mechanical ventilation  · Fentanyl, versed gtt, start precedex gtt  · Antibiotic with ceftriaxone  · Pressors for MAP over 65  · Complete amiodarone  · Solu-Cortef 100 every 8  · Start tube feeds  · Start Lantus 20 every 12, ISS     GI/DVT - SUP/Lovenox 40 every 12        Code Status: Prior    CCT excluding procedures 35 minutes    ELECTRONICALLY SIGNED:  Garrett Gray DO

## 2022-03-10 NOTE — CARE COORDINATION
Reviewed chart, visited unit and spoke with resident on ICU service. Orders indicated that precedex drip will be initiated. As previously noted, patient is from [facility], would not be able to return if ventilator support is necessary. Advised to continue to monitor for developments which would allow for more detailed discharge planning and to hold on LTAC referral at present. Carlos Manuel Negrete.  Maureen, MSN, RN  [facility] Case Management  [phone]

## 2022-03-10 NOTE — PROGRESS NOTES
Subjective:    Remains intubated sedated in ICU setting    Objective:    /66   Pulse 72   Temp 99.7 °F (37.6 °C) (Bladder)   Resp 19   Ht 5' 4\" (1.626 m)   Wt 298 lb (135.2 kg)   LMP  (LMP Unknown)   SpO2 100%   BMI 51.15 kg/m²     In: 3332.4 [I.V.:3085.5; NG/GT:23]  Out: 2495   In: 3332.4   Out: 0515 [Urine:1495]    General appearance intubated sedated  HEENT: AT/NC, MMM  Neck: FROM, supple  Lungs: Clear to auscultation  CV: RRR, no MRGs  Vasc: Radial pulses 2+  Abdomen: Soft, non-tender; no masses or HSM  Extremities: No peripheral edema or digital cyanosis  Skin: no rash, lesions or ulcers  Psych: Alert and oriented to person, place and time  Neuro: Alert and interactive     Recent Labs     03/08/22  0610 03/09/22  0430   WBC 7.3 22.5*   HGB 10.0* 9.2*   HCT 34.5 29.6*    207       Recent Labs     03/09/22  0005 03/09/22  0430 03/09/22  0757    138 137   K 4.5 4.4 4.2   CL 98 98 98   CO2 26 27 28   BUN 37* 39* 39*   CREATININE 1.3* 1.3* 1.3*   CALCIUM 9.0 8.6 8.5*       Assessment:    Principal Problem:    Sepsis ([facility] [address]  Resolved Problems:    * No resolved hospital problems.  *      Plan:    17-year-old woman with multiple comorbidities admitted to ICU setting for septic shock on triple pressor support, broad-spectrum IV antibiotic therapy, intubated sedated     IV fluid resuscitation for marked lactic acidosis-improving  Continue pressor support-down to 2 pressors, weaning,   on Solu-Cortef 3 times daily-wean as blood pressure improves  Pancultures pending-blood cultures with alpha hemolytic strep   Broad-spectrum IV antibiotics with infectious disease following-Merrem has been transitioned to Rocephin today  Amiodarone drip discontinued  Glargine, insulin sliding scale for blood sugar management, consider insulin drip in critically ill woman  Monitor renal function for possible prerenal failure given hypotension  No evidence of diabetic ketoacidosis    Daily labs  Discussed with nursing at bedside  DVT Prophylaxis   PT/OT  Discharge Colleen Grimes MD  9:14 PM  3/9/2022

## 2022-03-11 ENCOUNTER — APPOINTMENT (OUTPATIENT)
Dept: GENERAL RADIOLOGY | Age: 67
DRG: 870 | End: 2022-03-11
Payer: MEDICARE

## 2022-03-11 ENCOUNTER — APPOINTMENT (OUTPATIENT)
Dept: ULTRASOUND IMAGING | Age: 67
DRG: 870 | End: 2022-03-11
Payer: MEDICARE

## 2022-03-11 LAB
ANION GAP SERPL CALCULATED.3IONS-SCNC: 9 MMOL/L (ref 7–16)
ANTISTREPTOLYSIN-O: 358 IU/ML (ref 0–200)
B.E.: 4.4 MMOL/L (ref -3–3)
BASOPHILS ABSOLUTE: 0 E9/L (ref 0–0.2)
BASOPHILS RELATIVE PERCENT: 0 % (ref 0–2)
BUN BLDV-MCNC: 56 MG/DL (ref 6–23)
C-REACTIVE PROTEIN: 42.1 MG/DL (ref 0–0.4)
CALCIUM SERPL-MCNC: 8.5 MG/DL (ref 8.6–10.2)
CHLORIDE BLD-SCNC: 99 MMOL/L (ref 98–107)
CO2: 26 MMOL/L (ref 22–29)
COHB: 0.7 % (ref 0–1.5)
CREAT SERPL-MCNC: 1.2 MG/DL (ref 0.5–1)
CRITICAL: ABNORMAL
DATE ANALYZED: ABNORMAL
DATE OF COLLECTION: ABNORMAL
EOSINOPHILS ABSOLUTE: 0 E9/L (ref 0.05–0.5)
EOSINOPHILS RELATIVE PERCENT: 0 % (ref 0–6)
FIO2: 40 %
GFR AFRICAN AMERICAN: 54
GFR NON-AFRICAN AMERICAN: 45 ML/MIN/1.73
GLUCOSE BLD-MCNC: 370 MG/DL (ref 74–99)
HCO3: 28.4 MMOL/L (ref 22–26)
HCT VFR BLD CALC: 26.5 % (ref 34–48)
HEMOGLOBIN: 8.1 G/DL (ref 11.5–15.5)
HHB: 2.2 % (ref 0–5)
HYPOCHROMIA: ABNORMAL
LAB: ABNORMAL
LACTIC ACID: 1.5 MMOL/L (ref 0.5–2.2)
LYMPHOCYTES ABSOLUTE: 0.97 E9/L (ref 1.5–4)
LYMPHOCYTES RELATIVE PERCENT: 7 % (ref 20–42)
Lab: ABNORMAL
MAGNESIUM: 2.4 MG/DL (ref 1.6–2.6)
MCH RBC QN AUTO: 25.1 PG (ref 26–35)
MCHC RBC AUTO-ENTMCNC: 30.6 % (ref 32–34.5)
MCV RBC AUTO: 82 FL (ref 80–99.9)
METAMYELOCYTES RELATIVE PERCENT: 1 % (ref 0–1)
METER GLUCOSE: 265 MG/DL (ref 74–99)
METER GLUCOSE: 342 MG/DL (ref 74–99)
METER GLUCOSE: 381 MG/DL (ref 74–99)
METER GLUCOSE: 413 MG/DL (ref 74–99)
METER GLUCOSE: 426 MG/DL (ref 74–99)
METHB: 0.3 % (ref 0–1.5)
MODE: AC
MONOCYTES ABSOLUTE: 0.56 E9/L (ref 0.1–0.95)
MONOCYTES RELATIVE PERCENT: 4 % (ref 2–12)
NEUTROPHILS ABSOLUTE: 12.37 E9/L (ref 1.8–7.3)
NEUTROPHILS RELATIVE PERCENT: 88 % (ref 43–80)
O2 CONTENT: 12.4 ML/DL
O2 SATURATION: 97.8 % (ref 92–98.5)
O2HB: 96.8 % (ref 94–97)
OPERATOR ID: ABNORMAL
ORGANISM: ABNORMAL
ORGANISM: ABNORMAL
OVALOCYTES: ABNORMAL
PATIENT TEMP: 37 C
PCO2: 39.7 MMHG (ref 35–45)
PDW BLD-RTO: 15.9 FL (ref 11.5–15)
PEEP/CPAP: 8 CMH2O
PFO2: 2.53 MMHG/%
PH BLOOD GAS: 7.47 (ref 7.35–7.45)
PHOSPHORUS: 3.5 MG/DL (ref 2.5–4.5)
PLATELET # BLD: 152 E9/L (ref 130–450)
PMV BLD AUTO: 10.9 FL (ref 7–12)
PO2: 101.2 MMHG (ref 75–100)
POIKILOCYTES: ABNORMAL
POTASSIUM SERPL-SCNC: 4 MMOL/L (ref 3.5–5)
RBC # BLD: 3.23 E12/L (ref 3.5–5.5)
RR MECHANICAL: 18 B/MIN
SEDIMENTATION RATE, ERYTHROCYTE: 98 MM/HR (ref 0–20)
SODIUM BLD-SCNC: 134 MMOL/L (ref 132–146)
SOURCE, BLOOD GAS: ABNORMAL
THB: 9 G/DL (ref 11.5–16.5)
TIME ANALYZED: 549
VT MECHANICAL: 420 ML
WBC # BLD: 13.9 E9/L (ref 4.5–11.5)

## 2022-03-11 PROCEDURE — 2580000003 HC RX 258: Performed by: INTERNAL MEDICINE

## 2022-03-11 PROCEDURE — 2500000003 HC RX 250 WO HCPCS: Performed by: INTERNAL MEDICINE

## 2022-03-11 PROCEDURE — 80048 BASIC METABOLIC PNL TOTAL CA: CPT

## 2022-03-11 PROCEDURE — 86060 ANTISTREPTOLYSIN O TITER: CPT

## 2022-03-11 PROCEDURE — 2000000000 HC ICU R&B

## 2022-03-11 PROCEDURE — 83735 ASSAY OF MAGNESIUM: CPT

## 2022-03-11 PROCEDURE — 94003 VENT MGMT INPAT SUBQ DAY: CPT

## 2022-03-11 PROCEDURE — 85651 RBC SED RATE NONAUTOMATED: CPT

## 2022-03-11 PROCEDURE — C9113 INJ PANTOPRAZOLE SODIUM, VIA: HCPCS | Performed by: INTERNAL MEDICINE

## 2022-03-11 PROCEDURE — 82805 BLOOD GASES W/O2 SATURATION: CPT

## 2022-03-11 PROCEDURE — 6370000000 HC RX 637 (ALT 250 FOR IP): Performed by: STUDENT IN AN ORGANIZED HEALTH CARE EDUCATION/TRAINING PROGRAM

## 2022-03-11 PROCEDURE — 2580000003 HC RX 258: Performed by: SPECIALIST

## 2022-03-11 PROCEDURE — 84100 ASSAY OF PHOSPHORUS: CPT

## 2022-03-11 PROCEDURE — 85025 COMPLETE CBC W/AUTO DIFF WBC: CPT

## 2022-03-11 PROCEDURE — 36415 COLL VENOUS BLD VENIPUNCTURE: CPT

## 2022-03-11 PROCEDURE — 6360000002 HC RX W HCPCS: Performed by: INTERNAL MEDICINE

## 2022-03-11 PROCEDURE — 37799 UNLISTED PX VASCULAR SURGERY: CPT

## 2022-03-11 PROCEDURE — 82962 GLUCOSE BLOOD TEST: CPT

## 2022-03-11 PROCEDURE — 2580000003 HC RX 258: Performed by: STUDENT IN AN ORGANIZED HEALTH CARE EDUCATION/TRAINING PROGRAM

## 2022-03-11 PROCEDURE — 6370000000 HC RX 637 (ALT 250 FOR IP): Performed by: INTERNAL MEDICINE

## 2022-03-11 PROCEDURE — 6360000002 HC RX W HCPCS: Performed by: SPECIALIST

## 2022-03-11 PROCEDURE — 71045 X-RAY EXAM CHEST 1 VIEW: CPT

## 2022-03-11 PROCEDURE — 83605 ASSAY OF LACTIC ACID: CPT

## 2022-03-11 PROCEDURE — 76705 ECHO EXAM OF ABDOMEN: CPT

## 2022-03-11 PROCEDURE — 86140 C-REACTIVE PROTEIN: CPT

## 2022-03-11 PROCEDURE — 2500000003 HC RX 250 WO HCPCS: Performed by: STUDENT IN AN ORGANIZED HEALTH CARE EDUCATION/TRAINING PROGRAM

## 2022-03-11 RX ORDER — INSULIN GLARGINE 100 [IU]/ML
35 INJECTION, SOLUTION SUBCUTANEOUS 2 TIMES DAILY
Status: DISCONTINUED | OUTPATIENT
Start: 2022-03-11 | End: 2022-03-12

## 2022-03-11 RX ORDER — SENNOSIDES 8.8 MG/5ML
10 LIQUID ORAL 2 TIMES DAILY
Status: DISCONTINUED | OUTPATIENT
Start: 2022-03-11 | End: 2022-03-21

## 2022-03-11 RX ADMIN — ACETAMINOPHEN 650 MG: 325 TABLET ORAL at 08:09

## 2022-03-11 RX ADMIN — HYDROCORTISONE SODIUM SUCCINATE 50 MG: 100 INJECTION, POWDER, FOR SOLUTION INTRAMUSCULAR; INTRAVENOUS at 14:19

## 2022-03-11 RX ADMIN — DOCUSATE SODIUM LIQUID 100 MG: 100 LIQUID ORAL at 21:00

## 2022-03-11 RX ADMIN — INSULIN LISPRO 15 UNITS: 100 INJECTION, SOLUTION INTRAVENOUS; SUBCUTANEOUS at 16:06

## 2022-03-11 RX ADMIN — WATER 2000 MG: 1 INJECTION INTRAMUSCULAR; INTRAVENOUS; SUBCUTANEOUS at 13:38

## 2022-03-11 RX ADMIN — SODIUM CHLORIDE: 9 INJECTION, SOLUTION INTRAVENOUS at 02:02

## 2022-03-11 RX ADMIN — DOCUSATE SODIUM LIQUID 100 MG: 100 LIQUID ORAL at 12:47

## 2022-03-11 RX ADMIN — ENOXAPARIN SODIUM 135 MG: 150 INJECTION SUBCUTANEOUS at 21:02

## 2022-03-11 RX ADMIN — CHLORHEXIDINE GLUCONATE 0.12% ORAL RINSE 15 ML: 1.2 LIQUID ORAL at 08:09

## 2022-03-11 RX ADMIN — DEXMEDETOMIDINE HYDROCHLORIDE 0.6 MCG/KG/HR: 100 INJECTION, SOLUTION INTRAVENOUS at 15:16

## 2022-03-11 RX ADMIN — MUPIROCIN: 20 OINTMENT TOPICAL at 23:02

## 2022-03-11 RX ADMIN — INSULIN GLARGINE 35 UNITS: 100 INJECTION, SOLUTION SUBCUTANEOUS at 21:01

## 2022-03-11 RX ADMIN — INSULIN LISPRO 8 UNITS: 100 INJECTION, SOLUTION INTRAVENOUS; SUBCUTANEOUS at 01:36

## 2022-03-11 RX ADMIN — SENNOSIDES 17.6 MG: 8.8 SYRUP ORAL at 12:47

## 2022-03-11 RX ADMIN — INSULIN LISPRO 9 UNITS: 100 INJECTION, SOLUTION INTRAVENOUS; SUBCUTANEOUS at 21:00

## 2022-03-11 RX ADMIN — ACETAMINOPHEN 650 MG: 325 TABLET ORAL at 01:28

## 2022-03-11 RX ADMIN — INSULIN LISPRO 18 UNITS: 100 INJECTION, SOLUTION INTRAVENOUS; SUBCUTANEOUS at 08:05

## 2022-03-11 RX ADMIN — MUPIROCIN: 20 OINTMENT TOPICAL at 13:37

## 2022-03-11 RX ADMIN — SENNOSIDES 17.6 MG: 8.8 SYRUP ORAL at 21:02

## 2022-03-11 RX ADMIN — SODIUM CHLORIDE, PRESERVATIVE FREE 10 ML: 5 INJECTION INTRAVENOUS at 08:09

## 2022-03-11 RX ADMIN — SODIUM CHLORIDE, PRESERVATIVE FREE 10 ML: 5 INJECTION INTRAVENOUS at 21:03

## 2022-03-11 RX ADMIN — INSULIN GLARGINE 35 UNITS: 100 INJECTION, SOLUTION SUBCUTANEOUS at 08:06

## 2022-03-11 RX ADMIN — ACETAMINOPHEN 650 MG: 325 TABLET ORAL at 14:19

## 2022-03-11 RX ADMIN — CHLORHEXIDINE GLUCONATE 0.12% ORAL RINSE 15 ML: 1.2 LIQUID ORAL at 21:00

## 2022-03-11 RX ADMIN — Medication 200 MCG/HR: at 09:13

## 2022-03-11 RX ADMIN — MICONAZOLE NITRATE: 20 POWDER TOPICAL at 21:20

## 2022-03-11 RX ADMIN — DEXMEDETOMIDINE HYDROCHLORIDE 0.3 MCG/KG/HR: 100 INJECTION, SOLUTION INTRAVENOUS at 06:10

## 2022-03-11 RX ADMIN — ENOXAPARIN SODIUM 135 MG: 150 INJECTION SUBCUTANEOUS at 08:09

## 2022-03-11 RX ADMIN — DEXMEDETOMIDINE HYDROCHLORIDE 1 MCG/KG/HR: 100 INJECTION, SOLUTION INTRAVENOUS at 11:28

## 2022-03-11 RX ADMIN — INSULIN LISPRO 18 UNITS: 100 INJECTION, SOLUTION INTRAVENOUS; SUBCUTANEOUS at 12:47

## 2022-03-11 RX ADMIN — Medication 200 MCG/HR: at 02:01

## 2022-03-11 RX ADMIN — PANTOPRAZOLE SODIUM 40 MG: 40 INJECTION, POWDER, FOR SOLUTION INTRAVENOUS at 08:09

## 2022-03-11 RX ADMIN — MICONAZOLE NITRATE: 20 POWDER TOPICAL at 08:29

## 2022-03-11 RX ADMIN — HYDROCORTISONE SODIUM SUCCINATE 100 MG: 100 INJECTION, POWDER, FOR SOLUTION INTRAMUSCULAR; INTRAVENOUS at 06:11

## 2022-03-11 ASSESSMENT — PULMONARY FUNCTION TESTS
PIF_VALUE: 21
PIF_VALUE: 32
PIF_VALUE: 32
PIF_VALUE: 21
PIF_VALUE: 31
PIF_VALUE: 21
PIF_VALUE: 32
PIF_VALUE: 33
PIF_VALUE: 32
PIF_VALUE: 27
PIF_VALUE: 21
PIF_VALUE: 21
PIF_VALUE: 31
PIF_VALUE: 21
PIF_VALUE: 32
PIF_VALUE: 34
PIF_VALUE: 32
PIF_VALUE: 21
PIF_VALUE: 33
PIF_VALUE: 27
PIF_VALUE: 21
PIF_VALUE: 19
PIF_VALUE: 21
PIF_VALUE: 32
PIF_VALUE: 32
PIF_VALUE: 21
PIF_VALUE: 25

## 2022-03-11 ASSESSMENT — PAIN SCALES - GENERAL
PAINLEVEL_OUTOF10: 0

## 2022-03-11 NOTE — PATIENT CARE CONFERENCE
Intensive Care Daily Quality Rounding Checklist        ICU Team Members:  Dr. Luci Carrillo, residents, charge nurse, bedside nurse, clinical pharmacist and respiratory therapy     ICU Day #: 4     Intubation Date: 03/08/2022     Ventilator Day #: 3      Central Line Insertion Date: 03/08/2022                                                    Day #: 4      Arterial Line Insertion Date: 03/08/2022                             Day #: 4     Temporary Hemodialysis Catheter Insertion Date: N/A                             Day # N/A     DVT Prophylaxis: Lovenox    GI Prophylaxis: Protonix IV     Hansen Catheter Insertion Date: 03/8/2022                                        Day #: 4                             Continued need (if yes, reason documented and discussed with physician): Strict I and O in critical care patient     Skin Issues/ Wounds and ordered treatment discussed on rounds: Excoriation and tearing under pannus. Excoriation bilat groins and inner thighs. Skin is pruning between buttocks. Deep crevice in rt leg.     Goals/ Plans for the Day: monitor labs and vitals. Titrate pressors as tolerated and sedation. replace electrolytes. Decrease steroids and rate on vent. Bowel regimen.  Increase lantus and SSI

## 2022-03-11 NOTE — PROGRESS NOTES
Subjective:    Remains intubated sedated in ICU setting  No acute events overnight  Eyes partially open today when patient started    Objective:    BP (!) 147/75   Pulse 68   Temp 100.2 °F (37.9 °C) (Bladder)   Resp 19   Ht 5' 4\" (1.626 m)   Wt 298 lb (135.2 kg)   LMP  (LMP Unknown)   SpO2 100%   BMI 51.15 kg/m²     In: 4052 [I.V.:2852; NG/GT:1200]  Out: 1565   In: 4052   Out: 1565 [Urine:1565]    General appearance intubated sedated, poor hygiene malodorous  HEENT: AT/NC, MMM  Neck: FROM, supple  Lungs: Clear to auscultation  CV: RRR, no MRGs  Vasc: Radial pulses 2+  Abdomen: Soft, non-tender; no masses or HSM  Extremities: Ichthyosis bilateral lower extremities  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  *      Plan:    57-year-old woman with multiple comorbidities admitted to ICU setting for septic shock on triple pressor support, broad-spectrum IV antibiotic therapy, intubated sedated     IV fluid resuscitation for marked lactic acidosis-improving  Off pressor support now  on Solu-Cortef 3 times daily-wean now the blood pressure is improved-contributing to marked elevation in blood sugars  -blood cultures with alpha hemolytic strep  CRP continues to climb 42, sed rate 90  Broad-spectrum IV antibiotics with infectious disease following-Merrem has been transitioned to Rocephin 3/9/2021-beta strep group G  Amiodarone drip discontinued  Glargine, insulin sliding scale for blood sugar management, consider insulin drip in critically ill woman-blood sugars are still markedly elevated in the 400 range  Monitor renal function for possible prerenal failure given hypotension-improving 56/1.2  No evidence of diabetic ketoacidosis-continue to monitor  Initiation of Precedex, fentanyl and Versed for sedation/agitation    Daily labs   Discussed with nursing at bedside  DVT Prophylaxis   PT/OT  Discharge See Bueno MD  6:56 PM  3/11/2022

## 2022-03-11 NOTE — CARE COORDINATION
Social work / Discharge Planning:       Patient remains intubated in ICU. ID is following. Patient is from [facility] but cannot return there on vent. Social work will continue to follow with CM to assist with discharge planning.    Electronically signed by JADEN Grover on 3/11/2022 at 2:36 PM

## 2022-03-11 NOTE — PROGRESS NOTES
[address] [place] [address] [facility]  Progress Note    SUBJECTIVE:  Chief Complaint   Patient presents with    Cough     started a couple days ago    Hyperglycemia      per EMS    Chills    Nausea     Zofran given per EMS     The patient remains intubated and sedated. She is off vasopressors. Sedation is off but she is still unresponsive. Review of systems:  A 10 point review of systems was done. As stated above, otherwise negative. Medications:   mupirocin   Nasal BID    insulin glargine  35 Units SubCUTAneous BID    insulin lispro  0-18 Units SubCUTAneous Q4H    hydrocortisone sodium succinate PF  50 mg IntraVENous Q8H    docusate  100 mg Per NG tube BID    senna  10 mL Per NG tube BID    sodium chloride flush  5-40 mL IntraVENous BID    enoxaparin  1 mg/kg SubCUTAneous BID    cefTRIAXone (ROCEPHIN) IV  2,000 mg IntraVENous Q24H    chlorhexidine  15 mL Mouth/Throat BID    pantoprazole  40 mg IntraVENous Daily    miconazole   Topical BID      dexmedetomidine (PRECEDEX) IV infusion 1 mcg/kg/hr (22 0914)    fentaNYL 5 mcg/ml in 0.9%  ml infusion Stopped (22 0922)    dextrose      midazolam Stopped (22 0850)    sodium chloride 20 mL/hr at 22 0610     dextrose bolus (hypoglycemia) **OR** dextrose bolus (hypoglycemia), glucose, glucagon (rDNA), dextrose, midazolam, acetaminophen, acetaminophen    OBJECTIVE:  BP (!) 145/70   Pulse 70   Temp 99.3 °F (37.4 °C) (Bladder)   Resp 18   Ht 5' 4\" (1.626 m)   Wt 298 lb (135.2 kg)   LMP  (LMP Unknown)   SpO2 100%   BMI 51.15 kg/m²   Temp  Av.7 °F (38.2 °C)  Min: 99.3 °F (37.4 °C)  Max: 101.1 °F (38.4 °C)  Constitutional: The patient is lying even in the ICU. She is intubated and sedated. FiO2 40%. PEEP 8. No changes. Skin: Warm and dry. No rashes were noted. HEENT: Eyes show round, and reactive pupils. No jaundice. Moist mucous membranes, no ulcerations, no thrush. ETT.  NGT  Neck: Supple to movements. No lymphadenopathy. Chest: No respiratory distress. No crackles. Cardiovascular: Heart sounds rhythmic and regular. Abdomen: Positive bowel heart sounds rhythmic and regular. Sounds to auscultation. Benign to palpation. Large and pendulous. Intertrigo in the groin area. Extremities: Bilateral lower extremity lymphedema with lymphoceles. Minimal erythema on the right leg. Lines: Right radial arterial line 3/8/2022. Left IJ TLC 3/8/2022.     Laboratory and Tests Review:  Lab Results   Component Value Date    WBC 13.9 (H) 03/11/2022    WBC 18.8 (H) 03/10/2022    WBC 22.5 (H) 03/09/2022    HGB 8.1 (L) 03/11/2022    HCT 26.5 (L) 03/11/2022    MCV 82.0 03/11/2022     03/11/2022     Lab Results   Component Value Date    NEUTROABS 12.37 (H) 03/11/2022    NEUTROABS 17.30 (H) 03/10/2022    NEUTROABS 19.64 (H) 03/09/2022     Lab Results   Component Value Date    CRP 42.1 (H) 03/11/2022    CRP 13.7 (H) 03/08/2022    CRP 5.3 (H) 07/05/2021     No results found for: CRPHS  Lab Results   Component Value Date    SEDRATE 98 (H) 03/11/2022    SEDRATE 30 (H) 03/08/2022    SEDRATE 41 (H) 07/05/2021     Lab Results   Component Value Date    ALT 19 03/08/2022    AST 34 (H) 03/08/2022    ALKPHOS 103 03/08/2022    BILITOT 0.3 03/08/2022     Lab Results   Component Value Date     03/11/2022    K 4.0 03/11/2022    K 5.7 03/08/2022    CL 99 03/11/2022    CO2 26 03/11/2022    BUN 56 03/11/2022    CREATININE 1.2 03/11/2022    CREATININE 1.1 03/10/2022    CREATININE 1.3 03/09/2022    GFRAA 54 03/11/2022    LABGLOM 45 03/11/2022    GLUCOSE 370 03/11/2022    GLUCOSE 181 12/16/2011    PROT 7.2 03/08/2022    LABALBU 3.8 03/08/2022    LABALBU 4.5 11/02/2011    CALCIUM 8.5 03/11/2022    BILITOT 0.3 03/08/2022    ALKPHOS 103 03/08/2022    AST 34 03/08/2022    ALT 19 03/08/2022     Radiology:      Microbiology:   Rapid SARS-CoV-2: Negative  Respiratory panel: Negative  Blood cultures 3/8/2022: Group G Streptococcus in 4 of 4 bottles  Blood cultures 3/9/2022: Negative so far  Leg wound culture 3/8/2022: Proteus species  Nares screen MRSA: Positive    ASSESSMENT:  · Probable cellulitis right lower extremity  · Group G Streptococcus septicemia probably associated to cellulitis cultures of the leg, however, grew Proteus  · Possible intra-abdominal infection.   Possible cholecystitis  · Sepsis with septic shock, improving  · Acute respiratory failure   · Leukocytosis  · Lactic acidosis, resolved    PLAN:  · Continue Ceftriaxone, day 4 of antibiotics  · Check repeat blood cultures  · We will follow with you  · Right upper quadrant ultrasound    Spoke with nursing    Ella Ramos MD  11:07 AM  3/11/2022

## 2022-03-11 NOTE — PROGRESS NOTES
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  *      Plan:    68-year-old woman with multiple comorbidities admitted to ICU setting for septic shock on triple pressor support, broad-spectrum IV antibiotic therapy, intubated sedated     IV fluid resuscitation for marked lactic acidosis-improving  Continue pressor support-down to 2 pressors, weaning,   on Solu-Cortef 3 times daily-wean as blood pressure improves  Pancultures pending-blood cultures with alpha hemolytic strep   Broad-spectrum IV antibiotics with infectious disease following-Merrem has been transitioned to Rocephin 3/9/2021-beta strep group G  Amiodarone drip discontinued  Glargine, insulin sliding scale for blood sugar management, consider insulin drip in critically ill woman  Monitor renal function for possible prerenal failure given hypotension  No evidence of diabetic ketoacidosis-continue to monitor  Initiation of Precedex, fentanyl and Versed for sedation/agitation    Daily labs   Discussed with nursing at bedside  DVT Prophylaxis   PT/OT  Discharge Benito Sandy MD  9:31 PM  3/10/2022

## 2022-03-11 NOTE — PROGRESS NOTES
Critical Care Team - Daily Progress Note         Date and time: 3/11/2022 7:22 AM  Patient's name:  [place] AG[place] [address] Record Number: [id number]  Patient's account/billing number: [de-identified]  Patient's YOB: 1955  Age: 74 y.o. Date of Admission: 3/8/2022  4:28 AM  Length of stay during current admission: 3      Primary Care Physician: Joel Jordan DO  ICU Attending Physician: Dr. Abril Quinonez    Code Status: Full Code    Reason for ICU admission: Respiratory failure, shock      SUBJECTIVE:     OVERNIGHT EVENTS:         3/11: Patient had no acute problems overnight. She is currently being weaned off of vaso and weaned off sedation. She has been persistently hyperglycemic. 3/10: Pressors were changed from levo to rachelle. She would awake intermittently and be agitated. Precedex was started. Patient is tolerating tube feeds as well. 3/9: Patient has converted to sinus rhythm, opening eyes with spells of anxious awakenings. Levophed stopped. Remains on Vent. 3/8: Patient presented to the ICU with hypotension and respiratory failure. Patient was started on pressors and antibiotics at that time. Patient was also started on amiodarone drip for her A. fib. Patient was also started on insulin drip.     CURRENT VENTILATION STATUS:     [x] Ventilator  [] BIPAP  [] Nasal Cannula [] Room Air      IF INTUBATED, ET TUBE MARKING AT LOWER LIP:  cms    SECRETIONS  Amount:  [x] Small [] Moderate  [] Large  [] None  Color:     [] White [] Colored  [] Bloody    SEDATION:  RAAS Score:  [] Propofol gtt  [] Versed gtt  [] Fentanyl gtt [] Ativan gtt   [x] Precedex    PARALYZED:  [x] No    [] Yes      VASOPRESSORS:  [x] No    [x] Yes    If yes -   [] Levophed       [] Dopamine     [] Vasopressin       [] Dobutamine  [] Phenylephrine         [] Epinephrine    CENTRAL LINES:     [] No   [x] Yes   (Date of Insertion: 3/8)           If yes -     [x] Right IJ     [] Left IJ [] Right Femoral [] Left Femoral                   [] Right Subclavian [] Left Subclavian       BRAUN'S CATHETER:   [] No   [x] Yes  (Date of Insertion: )     URINE OUTPUT:            [x] Good   [] Low              [] Anuric      OBJECTIVE:     VITAL SIGNS:  BP (!) 144/73   Pulse 67   Temp 100.6 °F (38.1 °C) (Bladder)   Resp 18   Ht 5' 4\" (1.626 m)   Wt 298 lb (135.2 kg)   LMP  (LMP Unknown)   SpO2 100%   BMI 51.15 kg/m²   Tmax over 24 hours:  Temp (24hrs), Av.7 °F (38.2 °C), Min:99.9 °F (37.7 °C), Max:101.1 °F (38.4 °C)      Patient Vitals for the past 6 hrs:   BP Temp Temp src Pulse Resp SpO2   22 0600 (!) 144/73 100.6 °F (38.1 °C) Bladder 67 18 100 %   22 0500 -- -- -- 68 18 100 %   22 0408 -- -- -- 70 18 100 %   22 0400 (!) 145/74 100.8 °F (38.2 °C) Bladder 71 18 100 %   22 0300 -- -- -- 71 -- 100 %   22 0200 130/71 101.1 °F (38.4 °C) Bladder 71 20 100 %         Intake/Output Summary (Last 24 hours) at 3/11/2022 0722  Last data filed at 3/11/2022 0610  Gross per 24 hour   Intake 2967.31 ml   Output 715 ml   Net 2252.31 ml     Wt Readings from Last 2 Encounters:   22 298 lb (135.2 kg)   21 (!) 304 lb (137.9 kg)     Body mass index is 51.15 kg/m². PHYSICAL EXAMINATION:    Physical Exam  Constitutional:       Appearance: She is morbidly obese. She is ill-appearing. Interventions: She is sedated and intubated. HENT:      Head: Normocephalic and atraumatic. Eyes:      Conjunctiva/sclera: Conjunctivae normal.   Neck:      Trachea: No tracheal deviation. Cardiovascular:      Rate and Rhythm: Normal rate and regular rhythm. Heart sounds: Normal heart sounds. Pulmonary:      Effort: Pulmonary effort is normal. She is intubated. Breath sounds: Decreased breath sounds present. Abdominal:      General: Bowel sounds are normal.      Palpations: Abdomen is soft. Tenderness: There is no abdominal tenderness.    Musculoskeletal:      Cervical back: Neck supple. Right lower leg: Edema present. Left lower leg: Edema present. Lymphadenopathy:      Cervical: No cervical adenopathy. Skin:     General: Skin is warm and dry. Findings: No rash. Comments: +bl lower extremity lymphedema chronic skin changes with right leg fissure erythma   Neurological:      General: No focal deficit present.    Psychiatric:         Behavior: Behavior normal.      Any additional physical findings: n/a    MEDICATIONS:    Scheduled Meds:   insulin lispro  0-12 Units SubCUTAneous Q6H    insulin glargine  30 Units SubCUTAneous BID    sodium chloride flush  5-40 mL IntraVENous BID    enoxaparin  1 mg/kg SubCUTAneous BID    fentanNYL  50 mcg IntraVENous Once    midazolam  2 mg IntraVENous Once    cefTRIAXone (ROCEPHIN) IV  2,000 mg IntraVENous Q24H    chlorhexidine  15 mL Mouth/Throat BID    pantoprazole  40 mg IntraVENous Daily    hydrocortisone sodium succinate PF  100 mg IntraVENous Q8H    miconazole   Topical BID     Continuous Infusions:   dexmedetomidine (PRECEDEX) IV infusion 0.3 mcg/kg/hr (03/11/22 0610)    fentaNYL 5 mcg/ml in 0.9%  ml infusion 200 mcg/hr (03/11/22 0610)    vasopressin (Septic Shock) infusion 0.04 Units/min (03/11/22 0610)    phenylephrine (DARLYN-SYNEPHRINE) 50mg/250mL infusion Stopped (03/10/22 1601)    dextrose      midazolam 8 mg/hr (03/11/22 0610)    sodium chloride 20 mL/hr at 03/11/22 0610     PRN Meds:   dextrose bolus (hypoglycemia), 125 mL, PRN   Or  dextrose bolus (hypoglycemia), 250 mL, PRN  glucose, 15 g, PRN  glucagon (rDNA), 1 mg, PRN  dextrose, 100 mL/hr, PRN  midazolam, 2 mg, Q3H PRN  acetaminophen, 650 mg, Q4H PRN  acetaminophen, 650 mg, Q4H PRN          VENT SETTINGS (Comprehensive) (if applicable):  Vent Information  $Ventilation: $Subsequent Day  Skin Assessment: Clean, dry, & intact  Equipment ID: [id number]  Vent Type: [phone]  Vent Mode: AC/VC+  Vt Ordered: 420 mL  Rate Set: 18 bmp  Peak Flow: 0 L/min  Pressure Support: 0 cmH20  FiO2 : 40 %  SpO2: 100 %  SpO2/FiO2 ratio: 250  Sensitivity: 2  PEEP/CPAP: 8  I Time/ I Time %: 0.9 s  Humidification Source: Heated wire  Humidification Temp: 37  Humidification Temp Measured: 37  Circuit Condensation: Drained  Additional Respiratory  Assessments  Pulse: 67  Resp: 18  SpO2: 100 %  Position: Semi-Chapin's  Humidification Source: Heated wire  Humidification Temp: 37  Circuit Condensation: Drained  Oral Care: Mouthwash with chlorhexidine,Mouth suctioned  Subglottic Suction Done?: Yes  Cuff Pressure (cm H2O): 29 cm H2O    ABGs:   Recent Labs     03/11/22  0549   PH 7.472*   PCO2 39.7   PO2 101.2*   HCO3 28.4*   BE 4.4*   O2SAT 97.8       Laboratory findings:    Complete Blood Count:   Recent Labs     03/09/22  0430 03/10/22  0521 03/11/22  0548   WBC 22.5* 18.8* 13.9*   HGB 9.2* 8.4* 8.1*   HCT 29.6* 27.2* 26.5*    179 152        Last 3 Blood Glucose:   Recent Labs     03/09/22  0757 03/10/22  0521 03/11/22  0548   GLUCOSE 126* 243* 370*        PT/INR:    Lab Results   Component Value Date    PROTIME 19.5 04/29/2021    INR 1.8 04/29/2021     PTT:    Lab Results   Component Value Date    APTT 36.3 04/29/2021       Comprehensive Metabolic Profile:   Recent Labs     03/09/22  0757 03/10/22  0521 03/11/22  0548    135 134   K 4.2 3.8 4.0   CL 98 97* 99   CO2 28 27 26   BUN 39* 45* 56*   CREATININE 1.3* 1.1* 1.2*   GLUCOSE 126* 243* 370*   CALCIUM 8.5* 8.4* 8.5*      Magnesium:   Lab Results   Component Value Date    MG 2.4 03/11/2022     Phosphorus:   Lab Results   Component Value Date    PHOS 3.5 03/11/2022     Ionized Calcium:   Lab Results   Component Value Date    CAION 1.37 10/24/2016        Urinalysis: No results found for: UA     Troponin: No results for input(s): TROPONINI in the last 72 hours.     Microbiology:   Culture, Blood 2   Date Value Ref Range Status   03/09/2022 24 Hours no growth  Preliminary       Cultures during this admission:     Blood cultures:  [] None drawn      [x] Negative             [x]  Positive (Details: 3/8 beta strep group G, repeats 3/9 negative)  Urine Culture:   [] None drawn      [x] Negative             []  Positive (Details:  )  Sputum Culture:  [x] None drawn      [] Negative             []  Positive (Details:  )   Endotracheal aspirate: [x] None drawn      [] Negative             []  Positive (Details:  )     Other pertinent Labs:        Radiology/Imaging:     Chest Xray (3/11/2022): Suboptimal inspiration with likely bibasilar atelectasis as before. ASSESSMENT:     Principal Problem:    Sepsis ([address]  Resolved Problems:    * No resolved hospital problems. *      PLAN:     WEAN PER PROTOCOL:  [] No   [x] Yes  [] N/A    DISCONTINUE ANY LABS:   [x] No   [] Yes    ICU PROPHYLAXIS:  Stress ulcer:  [x] PPI Agent  [] [id number] [] Sucralfate  [] Other:  VTE:   [] Enoxaparin  [] Unfract. Heparin Subcut  [] EPC Cuffs    NUTRITION:  [x] NPO [x] Tube Feeding (Specify: ) [] TPN  [] PO (Diet: Diet NPO  ADULT TUBE FEEDING; Nasogastric; Immune Enhancing; Continuous; 10; Yes; 15; Q 8 hours; 50; 30; Q 6 hours)    HOME MEDICATIONS RECONCILED: [] No  [x] Yes    INSULIN DRIP:   [x] No   [] Yes    CONSULTATION NEEDED:  [x] No   [] Yes    FAMILY UPDATED:    [] No   [x] Yes    TRANSFER OUT OF ICU:   [x] No   [] Yes    SYSTEMS ASSESSMENT    Neuro     Intubated, sedated  Agitated at times    Respiratory     Intubated day 4  AC/VC, FiO2 40, vet set 420, rate 16, PEEP 8  Wean oxygen as tolerated.  Keep O2 sat 90-92%    Cardiovascular     Paroxysmal atrial fibrillation  Off amnio drip  Continue to monitor  Therapeutic Lovenox    Septic shock  Solu-Cortef 50 mg Q8H  Blood cultures positive  Patient febrile  Off pressors  See infectious disease below    Gastrointestinal     GI PPx  Protonix 40 mg IV daily    Bowel Regimen  Colace 100 mg BID  Senna daily    Renal     CKD stage III, stable  Baseline creatinine 1.1-1.2  Continue to monitor    Electrolyte abnormalities  Replete as needed  Continue to monitor     Infectious Disease     Gram-positive bacteremia  Blood cultures positive x2 for beta strep group G  Ceftriaxone (day 3)  Remains febrile  Off pressors  ID following, appreciate input    Leukocytosis, improving  Likely secondary to infection and steroid use  Remains febrile  Continue to monitor    Febrile  Tylenol suppository every 4 hours as needed for fever    Hematology/Oncology     Lovenox 135 mg twice daily    Endocrine     T2DM  Worsening hyperglycemia  Increase Lantus to 35 units twice daily  HDSSI  Hypoglycemia protocol  POC glucose checks    Social/Spiritual/DNR/Other     Full code    Rebeka Sanon DO, PGY-1            3/11/2022, 7:22 AM      NOTE: This report, in part or full, may have been transcribed using voice recognition software. Every effort was made to ensure accuracy; however, inadvertent computerized transcription errors may be present. Please excuse any transcriptional grammatical or spelling errors that may have escaped my editorial review. I personally saw, examined and provided care for the patient. Radiographs, labs and medication list were reviewed by me independently. Review of Residents documentation was conducted and revisions were made as appropriate. I agree with the above documented exam, problem list and plan of care. Assessment:     1. Shock, multifactorial - improved  2. Severe sepsis with bacteremia, Streptococcus  3. Right lower extremity cellulitis  4. A. fib with RVR -now NSR  5. Acute on chronic respiratory failure requiring mechanical ventilation  6. chronic lymphedema  7. Morbid obesity BMI 51  8. Chronic diastolic heart failure  9.  Medical noncompliance        Consultants: ID     · Full mechanical ventilation  · Fentanyl, versed gtt, start precedex gtt  · Antibiotic with ceftriaxone  · Pressors for MAP over 65  · Complete amiodarone  · Solu-Cortef 100 every 8  · TF's, bowel regimen  · Start Lantus 35 every 12, ISS     GI/DVT - SUP/Lovenox 40 every 12    CCT excluding procedures 38 minutes    Yung Stern DO

## 2022-03-12 ENCOUNTER — APPOINTMENT (OUTPATIENT)
Dept: GENERAL RADIOLOGY | Age: 67
DRG: 870 | End: 2022-03-12
Payer: MEDICARE

## 2022-03-12 LAB
ALBUMIN SERPL-MCNC: 2.3 G/DL (ref 3.5–5.2)
ALP BLD-CCNC: 157 U/L (ref 35–104)
ALT SERPL-CCNC: 278 U/L (ref 0–32)
ANION GAP SERPL CALCULATED.3IONS-SCNC: 10 MMOL/L (ref 7–16)
AST SERPL-CCNC: 147 U/L (ref 0–31)
B.E.: 4.8 MMOL/L (ref -3–3)
BASOPHILS ABSOLUTE: 0.02 E9/L (ref 0–0.2)
BASOPHILS RELATIVE PERCENT: 0.1 % (ref 0–2)
BILIRUB SERPL-MCNC: 0.3 MG/DL (ref 0–1.2)
BUN BLDV-MCNC: 44 MG/DL (ref 6–23)
CALCIUM SERPL-MCNC: 8.4 MG/DL (ref 8.6–10.2)
CHLORIDE BLD-SCNC: 102 MMOL/L (ref 98–107)
CO2: 27 MMOL/L (ref 22–29)
COHB: 0.5 % (ref 0–1.5)
CREAT SERPL-MCNC: 1 MG/DL (ref 0.5–1)
CRITICAL: ABNORMAL
DATE ANALYZED: ABNORMAL
DATE OF COLLECTION: ABNORMAL
EOSINOPHILS ABSOLUTE: 0.02 E9/L (ref 0.05–0.5)
EOSINOPHILS RELATIVE PERCENT: 0.1 % (ref 0–6)
FIO2: 40 %
GFR AFRICAN AMERICAN: >60
GFR NON-AFRICAN AMERICAN: 55 ML/MIN/1.73
GLUCOSE BLD-MCNC: 230 MG/DL (ref 74–99)
HCO3: 29.4 MMOL/L (ref 22–26)
HCT VFR BLD CALC: 28.1 % (ref 34–48)
HEMOGLOBIN: 8.4 G/DL (ref 11.5–15.5)
HHB: 2.9 % (ref 0–5)
IMMATURE GRANULOCYTES #: 0.26 E9/L
IMMATURE GRANULOCYTES %: 1.9 % (ref 0–5)
LAB: ABNORMAL
LACTIC ACID: 1 MMOL/L (ref 0.5–2.2)
LYMPHOCYTES ABSOLUTE: 1.49 E9/L (ref 1.5–4)
LYMPHOCYTES RELATIVE PERCENT: 11.1 % (ref 20–42)
Lab: ABNORMAL
MAGNESIUM: 2.3 MG/DL (ref 1.6–2.6)
MCH RBC QN AUTO: 24.9 PG (ref 26–35)
MCHC RBC AUTO-ENTMCNC: 29.9 % (ref 32–34.5)
MCV RBC AUTO: 83.4 FL (ref 80–99.9)
METER GLUCOSE: 190 MG/DL (ref 74–99)
METER GLUCOSE: 197 MG/DL (ref 74–99)
METER GLUCOSE: 205 MG/DL (ref 74–99)
METER GLUCOSE: 225 MG/DL (ref 74–99)
METER GLUCOSE: 228 MG/DL (ref 74–99)
METER GLUCOSE: 259 MG/DL (ref 74–99)
METHB: 0.3 % (ref 0–1.5)
MODE: ABNORMAL
MONOCYTES ABSOLUTE: 0.85 E9/L (ref 0.1–0.95)
MONOCYTES RELATIVE PERCENT: 6.3 % (ref 2–12)
NEUTROPHILS ABSOLUTE: 10.8 E9/L (ref 1.8–7.3)
NEUTROPHILS RELATIVE PERCENT: 80.5 % (ref 43–80)
O2 CONTENT: 12.5 ML/DL
O2 SATURATION: 97.1 % (ref 92–98.5)
O2HB: 96.3 % (ref 94–97)
OPERATOR ID: 913
ORGANISM: ABNORMAL
PATIENT TEMP: 37 C
PCO2: 43.4 MMHG (ref 35–45)
PDW BLD-RTO: 15.7 FL (ref 11.5–15)
PEEP/CPAP: 8 CMH2O
PFO2: 2.36 MMHG/%
PH BLOOD GAS: 7.45 (ref 7.35–7.45)
PHOSPHORUS: 2.7 MG/DL (ref 2.5–4.5)
PLATELET # BLD: 161 E9/L (ref 130–450)
PMV BLD AUTO: 10.8 FL (ref 7–12)
PO2: 94.5 MMHG (ref 75–100)
POTASSIUM SERPL-SCNC: 3.6 MMOL/L (ref 3.5–5)
PS: 12 CMH20
RBC # BLD: 3.37 E12/L (ref 3.5–5.5)
SODIUM BLD-SCNC: 139 MMOL/L (ref 132–146)
SOURCE, BLOOD GAS: ABNORMAL
THB: 9.1 G/DL (ref 11.5–16.5)
TIME ANALYZED: 1008
TOTAL PROTEIN: 6.1 G/DL (ref 6.4–8.3)
WBC # BLD: 13.4 E9/L (ref 4.5–11.5)
WOUND/ABSCESS: ABNORMAL
WOUND/ABSCESS: ABNORMAL

## 2022-03-12 PROCEDURE — 2500000003 HC RX 250 WO HCPCS: Performed by: INTERNAL MEDICINE

## 2022-03-12 PROCEDURE — 83605 ASSAY OF LACTIC ACID: CPT

## 2022-03-12 PROCEDURE — C9113 INJ PANTOPRAZOLE SODIUM, VIA: HCPCS | Performed by: INTERNAL MEDICINE

## 2022-03-12 PROCEDURE — 80053 COMPREHEN METABOLIC PANEL: CPT

## 2022-03-12 PROCEDURE — 2580000003 HC RX 258: Performed by: SPECIALIST

## 2022-03-12 PROCEDURE — 6360000002 HC RX W HCPCS: Performed by: SPECIALIST

## 2022-03-12 PROCEDURE — 37799 UNLISTED PX VASCULAR SURGERY: CPT

## 2022-03-12 PROCEDURE — 85025 COMPLETE CBC W/AUTO DIFF WBC: CPT

## 2022-03-12 PROCEDURE — 83735 ASSAY OF MAGNESIUM: CPT

## 2022-03-12 PROCEDURE — 2580000003 HC RX 258: Performed by: STUDENT IN AN ORGANIZED HEALTH CARE EDUCATION/TRAINING PROGRAM

## 2022-03-12 PROCEDURE — 84100 ASSAY OF PHOSPHORUS: CPT

## 2022-03-12 PROCEDURE — 2000000000 HC ICU R&B

## 2022-03-12 PROCEDURE — 6360000002 HC RX W HCPCS: Performed by: INTERNAL MEDICINE

## 2022-03-12 PROCEDURE — 36592 COLLECT BLOOD FROM PICC: CPT

## 2022-03-12 PROCEDURE — 36415 COLL VENOUS BLD VENIPUNCTURE: CPT

## 2022-03-12 PROCEDURE — 82962 GLUCOSE BLOOD TEST: CPT

## 2022-03-12 PROCEDURE — 6370000000 HC RX 637 (ALT 250 FOR IP): Performed by: SPECIALIST

## 2022-03-12 PROCEDURE — 71045 X-RAY EXAM CHEST 1 VIEW: CPT

## 2022-03-12 PROCEDURE — 6370000000 HC RX 637 (ALT 250 FOR IP): Performed by: INTERNAL MEDICINE

## 2022-03-12 PROCEDURE — 82805 BLOOD GASES W/O2 SATURATION: CPT

## 2022-03-12 PROCEDURE — 2580000003 HC RX 258: Performed by: INTERNAL MEDICINE

## 2022-03-12 PROCEDURE — 6370000000 HC RX 637 (ALT 250 FOR IP): Performed by: STUDENT IN AN ORGANIZED HEALTH CARE EDUCATION/TRAINING PROGRAM

## 2022-03-12 PROCEDURE — 94003 VENT MGMT INPAT SUBQ DAY: CPT

## 2022-03-12 RX ORDER — FENTANYL CITRATE 50 UG/ML
25 INJECTION, SOLUTION INTRAMUSCULAR; INTRAVENOUS
Status: DISCONTINUED | OUTPATIENT
Start: 2022-03-12 | End: 2022-03-22 | Stop reason: HOSPADM

## 2022-03-12 RX ORDER — INSULIN GLARGINE 100 [IU]/ML
40 INJECTION, SOLUTION SUBCUTANEOUS 2 TIMES DAILY
Status: DISCONTINUED | OUTPATIENT
Start: 2022-03-12 | End: 2022-03-13

## 2022-03-12 RX ORDER — POTASSIUM CHLORIDE 29.8 MG/ML
20 INJECTION INTRAVENOUS
Status: COMPLETED | OUTPATIENT
Start: 2022-03-12 | End: 2022-03-12

## 2022-03-12 RX ORDER — LINEZOLID 100 MG/5ML
600 SUSPENSION ORAL EVERY 12 HOURS SCHEDULED
Status: DISCONTINUED | OUTPATIENT
Start: 2022-03-12 | End: 2022-03-18

## 2022-03-12 RX ADMIN — PANTOPRAZOLE SODIUM 40 MG: 40 INJECTION, POWDER, FOR SOLUTION INTRAVENOUS at 09:53

## 2022-03-12 RX ADMIN — INSULIN LISPRO 6 UNITS: 100 INJECTION, SOLUTION INTRAVENOUS; SUBCUTANEOUS at 05:36

## 2022-03-12 RX ADMIN — DEXMEDETOMIDINE HYDROCHLORIDE 1 MCG/KG/HR: 100 INJECTION, SOLUTION INTRAVENOUS at 12:59

## 2022-03-12 RX ADMIN — DOCUSATE SODIUM LIQUID 100 MG: 100 LIQUID ORAL at 21:17

## 2022-03-12 RX ADMIN — ACETAMINOPHEN 650 MG: 325 TABLET ORAL at 17:25

## 2022-03-12 RX ADMIN — LINEZOLID 600 MG: 100 SUSPENSION ORAL at 12:29

## 2022-03-12 RX ADMIN — DEXMEDETOMIDINE HYDROCHLORIDE 1 MCG/KG/HR: 100 INJECTION, SOLUTION INTRAVENOUS at 06:19

## 2022-03-12 RX ADMIN — CHLORHEXIDINE GLUCONATE 0.12% ORAL RINSE 15 ML: 1.2 LIQUID ORAL at 09:47

## 2022-03-12 RX ADMIN — MIDAZOLAM HYDROCHLORIDE 2 MG: 1 INJECTION, SOLUTION INTRAMUSCULAR; INTRAVENOUS at 20:06

## 2022-03-12 RX ADMIN — SENNOSIDES 17.6 MG: 8.8 SYRUP ORAL at 21:19

## 2022-03-12 RX ADMIN — INSULIN LISPRO 6 UNITS: 100 INJECTION, SOLUTION INTRAVENOUS; SUBCUTANEOUS at 21:00

## 2022-03-12 RX ADMIN — MICONAZOLE NITRATE: 20 POWDER TOPICAL at 09:54

## 2022-03-12 RX ADMIN — INSULIN LISPRO 3 UNITS: 100 INJECTION, SOLUTION INTRAVENOUS; SUBCUTANEOUS at 12:19

## 2022-03-12 RX ADMIN — MICONAZOLE NITRATE: 20 POWDER TOPICAL at 21:19

## 2022-03-12 RX ADMIN — ENOXAPARIN SODIUM 135 MG: 150 INJECTION SUBCUTANEOUS at 09:47

## 2022-03-12 RX ADMIN — INSULIN LISPRO 6 UNITS: 100 INJECTION, SOLUTION INTRAVENOUS; SUBCUTANEOUS at 15:50

## 2022-03-12 RX ADMIN — LINEZOLID 600 MG: 100 SUSPENSION ORAL at 21:18

## 2022-03-12 RX ADMIN — INSULIN LISPRO 3 UNITS: 100 INJECTION, SOLUTION INTRAVENOUS; SUBCUTANEOUS at 09:47

## 2022-03-12 RX ADMIN — HYDROCORTISONE SODIUM SUCCINATE 50 MG: 100 INJECTION, POWDER, FOR SOLUTION INTRAMUSCULAR; INTRAVENOUS at 00:00

## 2022-03-12 RX ADMIN — INSULIN GLARGINE 40 UNITS: 100 INJECTION, SOLUTION SUBCUTANEOUS at 21:41

## 2022-03-12 RX ADMIN — INSULIN LISPRO 9 UNITS: 100 INJECTION, SOLUTION INTRAVENOUS; SUBCUTANEOUS at 00:30

## 2022-03-12 RX ADMIN — CHLORHEXIDINE GLUCONATE 0.12% ORAL RINSE 15 ML: 1.2 LIQUID ORAL at 21:17

## 2022-03-12 RX ADMIN — POTASSIUM CHLORIDE 20 MEQ: 29.8 INJECTION, SOLUTION INTRAVENOUS at 10:31

## 2022-03-12 RX ADMIN — SENNOSIDES 17.6 MG: 8.8 SYRUP ORAL at 09:53

## 2022-03-12 RX ADMIN — ACETAMINOPHEN 650 MG: 325 TABLET ORAL at 21:46

## 2022-03-12 RX ADMIN — DEXMEDETOMIDINE HYDROCHLORIDE 1.2 MCG/KG/HR: 100 INJECTION, SOLUTION INTRAVENOUS at 22:23

## 2022-03-12 RX ADMIN — WATER 2000 MG: 1 INJECTION INTRAMUSCULAR; INTRAVENOUS; SUBCUTANEOUS at 12:16

## 2022-03-12 RX ADMIN — HYDROCORTISONE SODIUM SUCCINATE 50 MG: 100 INJECTION, POWDER, FOR SOLUTION INTRAMUSCULAR; INTRAVENOUS at 15:44

## 2022-03-12 RX ADMIN — MUPIROCIN: 20 OINTMENT TOPICAL at 09:53

## 2022-03-12 RX ADMIN — DOCUSATE SODIUM LIQUID 100 MG: 100 LIQUID ORAL at 13:20

## 2022-03-12 RX ADMIN — DEXMEDETOMIDINE HYDROCHLORIDE 1 MCG/KG/HR: 100 INJECTION, SOLUTION INTRAVENOUS at 19:30

## 2022-03-12 RX ADMIN — SODIUM CHLORIDE, PRESERVATIVE FREE 10 ML: 5 INJECTION INTRAVENOUS at 09:54

## 2022-03-12 RX ADMIN — HYDROCORTISONE SODIUM SUCCINATE 50 MG: 100 INJECTION, POWDER, FOR SOLUTION INTRAMUSCULAR; INTRAVENOUS at 06:04

## 2022-03-12 RX ADMIN — DEXMEDETOMIDINE HYDROCHLORIDE 1 MCG/KG/HR: 100 INJECTION, SOLUTION INTRAVENOUS at 09:34

## 2022-03-12 RX ADMIN — INSULIN GLARGINE 35 UNITS: 100 INJECTION, SOLUTION SUBCUTANEOUS at 09:47

## 2022-03-12 RX ADMIN — ENOXAPARIN SODIUM 135 MG: 150 INJECTION SUBCUTANEOUS at 21:17

## 2022-03-12 RX ADMIN — SODIUM CHLORIDE, PRESERVATIVE FREE 10 ML: 5 INJECTION INTRAVENOUS at 21:00

## 2022-03-12 RX ADMIN — DEXMEDETOMIDINE HYDROCHLORIDE 1 MCG/KG/HR: 100 INJECTION, SOLUTION INTRAVENOUS at 16:02

## 2022-03-12 RX ADMIN — MUPIROCIN: 20 OINTMENT TOPICAL at 21:19

## 2022-03-12 RX ADMIN — POTASSIUM CHLORIDE 20 MEQ: 29.8 INJECTION, SOLUTION INTRAVENOUS at 12:10

## 2022-03-12 ASSESSMENT — PULMONARY FUNCTION TESTS
PIF_VALUE: 21
PIF_VALUE: 34
PIF_VALUE: 32
PIF_VALUE: 21
PIF_VALUE: 41
PIF_VALUE: 40
PIF_VALUE: 21
PIF_VALUE: 39
PIF_VALUE: 21
PIF_VALUE: 36
PIF_VALUE: 21
PIF_VALUE: 34

## 2022-03-12 ASSESSMENT — PAIN SCALES - GENERAL
PAINLEVEL_OUTOF10: 0

## 2022-03-12 NOTE — PROGRESS NOTES
[address] [place] [address] [facility]  Progress Note    SUBJECTIVE:  Chief Complaint   Patient presents with    Cough     started a couple days ago    Hyperglycemia      per EMS    Chills    Nausea     Zofran given per EMS     The patient remains intubated and sedated. She is off vasopressors. Review of systems:  A 10 point review of systems was done. As stated above, otherwise negative. Medications:   potassium chloride  20 mEq IntraVENous Q1H    insulin glargine  40 Units SubCUTAneous BID    mupirocin   Nasal BID    insulin lispro  0-18 Units SubCUTAneous Q4H    hydrocortisone sodium succinate PF  50 mg IntraVENous Q8H    docusate  100 mg Per NG tube BID    senna  10 mL Per NG tube BID    sodium chloride flush  5-40 mL IntraVENous BID    enoxaparin  1 mg/kg SubCUTAneous BID    cefTRIAXone (ROCEPHIN) IV  2,000 mg IntraVENous Q24H    chlorhexidine  15 mL Mouth/Throat BID    pantoprazole  40 mg IntraVENous Daily    miconazole   Topical BID      dexmedetomidine (PRECEDEX) IV infusion 1 mcg/kg/hr (22 0934)    fentaNYL 5 mcg/ml in 0.9%  ml infusion Stopped (22 0922)    dextrose      midazolam Stopped (22 0850)    sodium chloride 20 mL/hr at 22 0600     fentanNYL, dextrose bolus (hypoglycemia) **OR** dextrose bolus (hypoglycemia), glucose, glucagon (rDNA), dextrose, midazolam, acetaminophen, acetaminophen    OBJECTIVE:  /81   Pulse 65   Temp 100.2 °F (37.9 °C)   Resp 22   Ht 5' 4\" (1.626 m)   Wt (!) 310 lb 6.5 oz (140.8 kg)   LMP  (LMP Unknown)   SpO2 100%   BMI 53.28 kg/m²   Temp  Av °F (37.8 °C)  Min: 99.9 °F (37.7 °C)  Max: 100.2 °F (37.9 °C)  Constitutional: The patient is lying even in the ICU. She is intubated and sedated. FiO2 40%. PEEP 8. No changes. Skin: Warm and dry. No rashes were noted. HEENT: Eyes show round, and reactive pupils. No jaundice. Moist mucous membranes, no ulcerations, no thrush. ETT. NGT  Neck: Supple to movements. No lymphadenopathy. Chest: No respiratory distress. No crackles. Cardiovascular: Heart sounds rhythmic and regular. Abdomen: Positive bowel heart sounds rhythmic and regular. Sounds to auscultation. Benign to palpation. Large and pendulous. Intertrigo in the groin area. Extremities: Bilateral lower extremity lymphedema with lymphoceles. Minimal erythema on the right leg. Lines: Right radial arterial line 3/8/2022. Left IJ TLC 3/8/2022.     Laboratory and Tests Review:  Lab Results   Component Value Date    WBC 13.4 (H) 03/12/2022    WBC 13.9 (H) 03/11/2022    WBC 18.8 (H) 03/10/2022    HGB 8.4 (L) 03/12/2022    HCT 28.1 (L) 03/12/2022    MCV 83.4 03/12/2022     03/12/2022     Lab Results   Component Value Date    NEUTROABS 10.80 (H) 03/12/2022    NEUTROABS 12.37 (H) 03/11/2022    NEUTROABS 17.30 (H) 03/10/2022     Lab Results   Component Value Date    CRP 42.1 (H) 03/11/2022    CRP 13.7 (H) 03/08/2022    CRP 5.3 (H) 07/05/2021     No results found for: CRPHS  Lab Results   Component Value Date    SEDRATE 98 (H) 03/11/2022    SEDRATE 30 (H) 03/08/2022    SEDRATE 41 (H) 07/05/2021     Lab Results   Component Value Date     (H) 03/12/2022     (H) 03/12/2022    ALKPHOS 157 (H) 03/12/2022    BILITOT 0.3 03/12/2022     Lab Results   Component Value Date     03/12/2022    K 3.6 03/12/2022    K 5.7 03/08/2022     03/12/2022    CO2 27 03/12/2022    BUN 44 03/12/2022    CREATININE 1.0 03/12/2022    CREATININE 1.2 03/11/2022    CREATININE 1.1 03/10/2022    GFRAA >60 03/12/2022    LABGLOM 55 03/12/2022    GLUCOSE 230 03/12/2022    GLUCOSE 181 12/16/2011    PROT 6.1 03/12/2022    LABALBU 2.3 03/12/2022    LABALBU 4.5 11/02/2011    CALCIUM 8.4 03/12/2022    BILITOT 0.3 03/12/2022    ALKPHOS 157 03/12/2022     03/12/2022     03/12/2022     Radiology:      Microbiology:   Rapid SARS-CoV-2: Negative  Respiratory panel: Negative  Blood cultures 3/8/2022: Group G Streptococcus in 4 of 4 bottles  Blood cultures 3/9/2022: Negative so far  Leg wound culture 3/8/2022: Proteus, MRSA, CoNS  Nares screen MRSA: Positive    ASSESSMENT:  · Probable cellulitis right lower extremity  · Group G Streptococcus septicemia probably associated to cellulitis cultures of the leg, however, grew Proteus  · Possible intra-abdominal infection. Possible cholecystitis  · Sepsis with septic shock, improving  · Acute respiratory failure   · Leukocytosis  · Lactic acidosis, resolved  · Elevation of transaminases. Possible cholecystitis.   Ultrasound shows gallstones and edematous gallbladder wall    PLAN:  · Continue Ceftriaxone, day 5  · Add Linezolid  · Check repeat blood cultures  · We will follow with you  · Right upper quadrant ultrasound  · Consider cholecystostomy tube    Spoke with nursing    Elba Rai MD  11:21 AM  3/12/2022

## 2022-03-12 NOTE — PATIENT CARE CONFERENCE
Intensive Care Daily Quality Rounding Checklist      ICU Team Members: Dr. Yu Garcia, resident, bedside nurse, charge nurse, RT    ICU Day #:4    Intubation Date: 3/8    Ventilator Day #: 4    Central Line Insertion Date: 3/8        Day #: 4     Arterial Line Insertion Date:3/8       Day #:4    DVT Prophylaxis: Lovenox    GI Prophylaxis: Protonix qday    Hansen Catheter Insertion Date: 3/8       Day #: 4      Continued need (if yes, reason documented and discussed with physician): Y    Skin Issues/ Wounds and ordered treatment discussed on rounds:    -Stage II coccyx w/mepilex  -Stage II L nare w/protective barrier    Goals/ Plans for the Day: Monitor labs and vitals. Titrate pressors as tolerated and wean sedation. Continue critical care management.

## 2022-03-12 NOTE — PLAN OF CARE
Pain:  Goal: Pain level will decrease  Description: Pain level will decrease  Outcome: Ongoing  Goal: Recognizes and communicates pain  Description: Recognizes and communicates pain  Outcome: Ongoing  Goal: Control of acute pain  Description: Control of acute pain  Outcome: Ongoing  Goal: Control of chronic pain  Description: Control of chronic pain  Outcome: Ongoing     Problem: Serum Glucose Level - Abnormal:  Goal: Ability to maintain appropriate glucose levels will improve to within specified parameters  Description: Ability to maintain appropriate glucose levels will improve to within specified parameters  Outcome: Ongoing     Problem: Skin Integrity - Impaired:  Goal: Will show no infection signs and symptoms  Description: Will show no infection signs and symptoms  Outcome: Ongoing  Goal: Absence of new skin breakdown  Description: Absence of new skin breakdown  Outcome: Ongoing     Problem: Sleep Pattern Disturbance:  Goal: Appears well-rested  Description: Appears well-rested  Outcome: Ongoing     Problem: Tissue Perfusion, Altered:  Goal: Circulatory function within specified parameters  Description: Circulatory function within specified parameters  Outcome: Ongoing     Problem: Tissue Perfusion - Cardiopulmonary, Altered:  Goal: Absence of angina  Description: Absence of angina  Outcome: Ongoing  Goal: Hemodynamic stability will improve  Description: Hemodynamic stability will improve  Outcome: Ongoing     Problem: Skin Integrity:  Goal: Will show no infection signs and symptoms  Description: Will show no infection signs and symptoms  Outcome: Ongoing  Goal: Absence of new skin breakdown  Description: Absence of new skin breakdown  Outcome: Ongoing     Problem: Falls - Risk of:  Goal: Will remain free from falls  Description: Will remain free from falls  Outcome: Ongoing  Goal: Absence of physical injury  Description: Absence of physical injury  Outcome: Ongoing

## 2022-03-12 NOTE — PROGRESS NOTES
Subjective:    Remains intubated sedated in ICU setting  No acute events overnight  Eyes partially open today when patient started    Objective:    /72   Pulse 67   Temp 100.6 °F (38.1 °C) (Bladder)   Resp 17   Ht 5' 4\" (1.626 m)   Wt (!) 310 lb 6.5 oz (140.8 kg)   LMP  (LMP Unknown)   SpO2 100%   BMI 53.28 kg/m²     In: 2995.5 [I.V.:1561.4; NG/GT:1339]  Out: 2440   In: 2995.5   Out: 2440 [Urine:2440]    General appearance intubated sedated, poor hygiene malodorous  HEENT: AT/NC, MMM  Neck: FROM, supple  Lungs: Clear to auscultation  CV: RRR, no MRGs  Vasc: Radial pulses 2+  Abdomen: Soft, non-tender; no masses or HSM  Extremities: Ichthyosis bilateral lower extremities  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  *      Plan:    58-year-old woman with multiple comorbidities admitted to ICU setting for septic shock on triple pressor support, broad-spectrum IV antibiotic therapy, intubated sedated     IV fluid resuscitation for marked lactic acidosis-improving  Off pressor support now  on Solu-Cortef 3 times daily-wean now the blood pressure is improved-contributing to marked elevation in blood sugars  -blood cultures with alpha hemolytic strep  CRP continues to climb 42, sed rate 90  Broad-spectrum IV antibiotics with infectious disease following-Merrem has been transitioned to Rocephin 3/9/2021-beta strep group G/ linezolid added-   Await ruq us ordered per id   Amiodarone drip discontinued  Glargine, insulin sliding scale for blood sugar management, consider insulin drip in critically ill woman-blood sugars are still markedly elevated in the 400 range  Monitor renal function for possible prerenal failure given hypotension-improving 56/1.2  No evidence of diabetic ketoacidosis-continue to monitor  Initiation of Precedex, fentanyl and Versed for sedation/agitation    Daily labs   Discussed with nursing at bedside  DVT Prophylaxis   PT/OT  Discharge [address] MD Satish  4:16 PM  3/12/2022

## 2022-03-12 NOTE — PROGRESS NOTES
Critical Care Team - Daily Progress Note         Date and time: 3/12/2022 12:14 PM  Patient's name:  [place] AG[place] [address] Record Number: [id number]  Patient's account/billing number: [de-identified]  Patient's YOB: 1955  Age: 74 y.o. Date of Admission: 3/8/2022  4:28 AM  Length of stay during current admission: 4      Primary Care Physician: Dada Hernandez DO  ICU Attending Physician: Raisa Torrez DO    Code Status: Full Code    Reason for ICU admission: Respiratory failure, shock      SUBJECTIVE     Pressors decreased. Mentation has not been appropriate yet. Tolerating tube feeds. OBJECTIVE     Vital signs:   height is 5' 4\" (1.626 m) and weight is 310 lb 6.5 oz (140.8 kg) (abnormal). Her temperature is 100.2 °F (37.9 °C). Her blood pressure is 135/81 and her pulse is 65. Her respiration is 22 and oxygen saturation is 100%. I/O last 3 completed shifts: In: 3645.1 [I.V.:2265. 1; NG/GT:1380]  Out: 2390 [Urine:2390]      PHYSICAL EXAM:    Physical Exam  Constitutional:       Appearance: She is morbidly obese. She is ill-appearing. Interventions: She is sedated and intubated. HENT:      Head: Normocephalic and atraumatic. Eyes:      Conjunctiva/sclera: Conjunctivae normal.   Neck:      Trachea: No tracheal deviation. Cardiovascular:      Rate and Rhythm: Normal rate and regular rhythm. Heart sounds: Normal heart sounds. Pulmonary:      Effort: Pulmonary effort is normal. She is intubated. Breath sounds: Decreased breath sounds present. Abdominal:      General: Bowel sounds are normal.      Palpations: Abdomen is soft. Tenderness: There is no abdominal tenderness. Musculoskeletal:      Cervical back: Neck supple. Right lower leg: Edema present. Left lower leg: Edema present. Lymphadenopathy:      Cervical: No cervical adenopathy. Skin:     General: Skin is warm and dry. Findings: No rash.       Comments: +bl lower extremity lymphedema chronic skin changes with right leg fissure erythma   Neurological:      General: No focal deficit present. Mental Status: She is disoriented. MEDICATIONS:  Scheduled Meds:   insulin glargine  40 Units SubCUTAneous BID    linezolid  600 mg Per NG tube 2 times per day    mupirocin   Nasal BID    insulin lispro  0-18 Units SubCUTAneous Q4H    hydrocortisone sodium succinate PF  50 mg IntraVENous Q8H    docusate  100 mg Per NG tube BID    senna  10 mL Per NG tube BID    sodium chloride flush  5-40 mL IntraVENous BID    enoxaparin  1 mg/kg SubCUTAneous BID    cefTRIAXone (ROCEPHIN) IV  2,000 mg IntraVENous Q24H    chlorhexidine  15 mL Mouth/Throat BID    pantoprazole  40 mg IntraVENous Daily    miconazole   Topical BID     Continuous Infusions:   dexmedetomidine (PRECEDEX) IV infusion 1 mcg/kg/hr (03/12/22 0934)    fentaNYL 5 mcg/ml in 0.9%  ml infusion Stopped (03/11/22 0922)    dextrose      midazolam Stopped (03/11/22 0850)    sodium chloride 20 mL/hr at 03/12/22 0600     PRN Meds:fentanNYL, dextrose bolus (hypoglycemia) **OR** dextrose bolus (hypoglycemia), glucose, glucagon (rDNA), dextrose, midazolam, acetaminophen, acetaminophen    PERTINENT LAB RESULTS:  Labs reviewed. DIAGNOSTICS:  Imaging reviewed. ASSESMENT/PLAN     Assessment:     1. Shock, multifactorial  2. Severe sepsis with bacteremia, Streptococcus  3. Right lower extremity cellulitis  4. A. fib with RVR -now NSR  5. Acute on chronic respiratory failure requiring mechanical ventilation  6. chronic lymphedema  7. Morbid obesity BMI 51  8. Chronic diastolic heart failure  9.  Medical noncompliance        Consultants: ID     · PSV as tolerated  · Fentanyl, sedation weaned off  · Antibiotic with ceftriaxone  · Pressors for MAP over 65  · Solu-Cortef  50 every 8  · Tube feeds, bowel regiment  · Start Lantus 40 every 12, ISS     GI/DVT - SUP/Lovenox 40 every 12        Code Status: Full Code    CCT excluding procedures 35 minutes    ELECTRONICALLY SIGNED:  Pedro Luis Richardson, DO

## 2022-03-13 ENCOUNTER — APPOINTMENT (OUTPATIENT)
Dept: GENERAL RADIOLOGY | Age: 67
DRG: 870 | End: 2022-03-13
Payer: MEDICARE

## 2022-03-13 LAB
ALBUMIN SERPL-MCNC: 2.7 G/DL (ref 3.5–5.2)
ALP BLD-CCNC: 197 U/L (ref 35–104)
ALT SERPL-CCNC: 271 U/L (ref 0–32)
ANION GAP SERPL CALCULATED.3IONS-SCNC: 8 MMOL/L (ref 7–16)
AST SERPL-CCNC: 137 U/L (ref 0–31)
BASOPHILS ABSOLUTE: 0 E9/L (ref 0–0.2)
BASOPHILS RELATIVE PERCENT: 0 % (ref 0–2)
BILIRUB SERPL-MCNC: 0.3 MG/DL (ref 0–1.2)
BUN BLDV-MCNC: 34 MG/DL (ref 6–23)
CALCIUM SERPL-MCNC: 8.6 MG/DL (ref 8.6–10.2)
CHLORIDE BLD-SCNC: 102 MMOL/L (ref 98–107)
CO2: 29 MMOL/L (ref 22–29)
CREAT SERPL-MCNC: 0.8 MG/DL (ref 0.5–1)
EOSINOPHILS ABSOLUTE: 0 E9/L (ref 0.05–0.5)
EOSINOPHILS RELATIVE PERCENT: 0 % (ref 0–6)
GFR AFRICAN AMERICAN: >60
GFR NON-AFRICAN AMERICAN: >60 ML/MIN/1.73
GLUCOSE BLD-MCNC: 297 MG/DL (ref 74–99)
HCT VFR BLD CALC: 28.9 % (ref 34–48)
HEMOGLOBIN: 8.6 G/DL (ref 11.5–15.5)
HYPOCHROMIA: ABNORMAL
LACTIC ACID: 1.1 MMOL/L (ref 0.5–2.2)
LYMPHOCYTES ABSOLUTE: 1.66 E9/L (ref 1.5–4)
LYMPHOCYTES RELATIVE PERCENT: 18.4 % (ref 20–42)
MAGNESIUM: 2.2 MG/DL (ref 1.6–2.6)
MCH RBC QN AUTO: 24.8 PG (ref 26–35)
MCHC RBC AUTO-ENTMCNC: 29.8 % (ref 32–34.5)
MCV RBC AUTO: 83.3 FL (ref 80–99.9)
METER GLUCOSE: 201 MG/DL (ref 74–99)
METER GLUCOSE: 201 MG/DL (ref 74–99)
METER GLUCOSE: 252 MG/DL (ref 74–99)
METER GLUCOSE: 261 MG/DL (ref 74–99)
METER GLUCOSE: 267 MG/DL (ref 74–99)
METER GLUCOSE: 287 MG/DL (ref 74–99)
MONOCYTES ABSOLUTE: 0.74 E9/L (ref 0.1–0.95)
MONOCYTES RELATIVE PERCENT: 7.9 % (ref 2–12)
MYELOCYTE PERCENT: 2.6 % (ref 0–0)
NEUTROPHILS ABSOLUTE: 6.81 E9/L (ref 1.8–7.3)
NEUTROPHILS RELATIVE PERCENT: 71.1 % (ref 43–80)
NUCLEATED RED BLOOD CELLS: 0.9 /100 WBC
PDW BLD-RTO: 15.9 FL (ref 11.5–15)
PHOSPHORUS: 2.3 MG/DL (ref 2.5–4.5)
PLATELET # BLD: 206 E9/L (ref 130–450)
PMV BLD AUTO: 11.1 FL (ref 7–12)
POLYCHROMASIA: ABNORMAL
POTASSIUM SERPL-SCNC: 3.6 MMOL/L (ref 3.5–5)
RBC # BLD: 3.47 E12/L (ref 3.5–5.5)
SODIUM BLD-SCNC: 139 MMOL/L (ref 132–146)
TOTAL PROTEIN: 6.4 G/DL (ref 6.4–8.3)
WBC # BLD: 9.2 E9/L (ref 4.5–11.5)

## 2022-03-13 PROCEDURE — 36415 COLL VENOUS BLD VENIPUNCTURE: CPT

## 2022-03-13 PROCEDURE — 80053 COMPREHEN METABOLIC PANEL: CPT

## 2022-03-13 PROCEDURE — 6360000002 HC RX W HCPCS: Performed by: SPECIALIST

## 2022-03-13 PROCEDURE — 71045 X-RAY EXAM CHEST 1 VIEW: CPT

## 2022-03-13 PROCEDURE — 2580000003 HC RX 258: Performed by: INTERNAL MEDICINE

## 2022-03-13 PROCEDURE — 85025 COMPLETE CBC W/AUTO DIFF WBC: CPT

## 2022-03-13 PROCEDURE — C9113 INJ PANTOPRAZOLE SODIUM, VIA: HCPCS | Performed by: INTERNAL MEDICINE

## 2022-03-13 PROCEDURE — 6370000000 HC RX 637 (ALT 250 FOR IP): Performed by: INTERNAL MEDICINE

## 2022-03-13 PROCEDURE — 2500000003 HC RX 250 WO HCPCS: Performed by: INTERNAL MEDICINE

## 2022-03-13 PROCEDURE — 84100 ASSAY OF PHOSPHORUS: CPT

## 2022-03-13 PROCEDURE — 83735 ASSAY OF MAGNESIUM: CPT

## 2022-03-13 PROCEDURE — 6360000002 HC RX W HCPCS: Performed by: INTERNAL MEDICINE

## 2022-03-13 PROCEDURE — 2000000000 HC ICU R&B

## 2022-03-13 PROCEDURE — 83605 ASSAY OF LACTIC ACID: CPT

## 2022-03-13 PROCEDURE — 82962 GLUCOSE BLOOD TEST: CPT

## 2022-03-13 PROCEDURE — 2580000003 HC RX 258: Performed by: SPECIALIST

## 2022-03-13 PROCEDURE — 94003 VENT MGMT INPAT SUBQ DAY: CPT

## 2022-03-13 PROCEDURE — 36592 COLLECT BLOOD FROM PICC: CPT

## 2022-03-13 PROCEDURE — 2580000003 HC RX 258: Performed by: STUDENT IN AN ORGANIZED HEALTH CARE EDUCATION/TRAINING PROGRAM

## 2022-03-13 RX ORDER — QUETIAPINE FUMARATE 25 MG/1
50 TABLET, FILM COATED ORAL NIGHTLY
Status: DISCONTINUED | OUTPATIENT
Start: 2022-03-13 | End: 2022-03-14

## 2022-03-13 RX ORDER — QUETIAPINE FUMARATE 25 MG/1
25 TABLET, FILM COATED ORAL EVERY MORNING
Status: DISCONTINUED | OUTPATIENT
Start: 2022-03-13 | End: 2022-03-14

## 2022-03-13 RX ORDER — INSULIN GLARGINE 100 [IU]/ML
45 INJECTION, SOLUTION SUBCUTANEOUS 2 TIMES DAILY
Status: DISCONTINUED | OUTPATIENT
Start: 2022-03-13 | End: 2022-03-15

## 2022-03-13 RX ADMIN — SENNOSIDES 17.6 MG: 8.8 SYRUP ORAL at 21:29

## 2022-03-13 RX ADMIN — DEXMEDETOMIDINE HYDROCHLORIDE 1.5 MCG/KG/HR: 100 INJECTION, SOLUTION INTRAVENOUS at 10:12

## 2022-03-13 RX ADMIN — INSULIN LISPRO 9 UNITS: 100 INJECTION, SOLUTION INTRAVENOUS; SUBCUTANEOUS at 05:47

## 2022-03-13 RX ADMIN — MIDAZOLAM HYDROCHLORIDE 2 MG: 1 INJECTION, SOLUTION INTRAMUSCULAR; INTRAVENOUS at 14:13

## 2022-03-13 RX ADMIN — HYDROCORTISONE SODIUM SUCCINATE 50 MG: 100 INJECTION, POWDER, FOR SOLUTION INTRAMUSCULAR; INTRAVENOUS at 06:00

## 2022-03-13 RX ADMIN — DEXMEDETOMIDINE HYDROCHLORIDE 1.5 MCG/KG/HR: 100 INJECTION, SOLUTION INTRAVENOUS at 06:05

## 2022-03-13 RX ADMIN — INSULIN LISPRO 9 UNITS: 100 INJECTION, SOLUTION INTRAVENOUS; SUBCUTANEOUS at 00:02

## 2022-03-13 RX ADMIN — ENOXAPARIN SODIUM 135 MG: 150 INJECTION SUBCUTANEOUS at 08:39

## 2022-03-13 RX ADMIN — INSULIN LISPRO 9 UNITS: 100 INJECTION, SOLUTION INTRAVENOUS; SUBCUTANEOUS at 08:33

## 2022-03-13 RX ADMIN — DEXMEDETOMIDINE HYDROCHLORIDE 1.5 MCG/KG/HR: 100 INJECTION, SOLUTION INTRAVENOUS at 23:30

## 2022-03-13 RX ADMIN — SENNOSIDES 17.6 MG: 8.8 SYRUP ORAL at 08:40

## 2022-03-13 RX ADMIN — ENOXAPARIN SODIUM 135 MG: 150 INJECTION SUBCUTANEOUS at 21:28

## 2022-03-13 RX ADMIN — DEXMEDETOMIDINE HYDROCHLORIDE 1.5 MCG/KG/HR: 100 INJECTION, SOLUTION INTRAVENOUS at 12:05

## 2022-03-13 RX ADMIN — SODIUM CHLORIDE, PRESERVATIVE FREE 10 ML: 5 INJECTION INTRAVENOUS at 08:39

## 2022-03-13 RX ADMIN — MIDAZOLAM HYDROCHLORIDE 2 MG: 1 INJECTION, SOLUTION INTRAMUSCULAR; INTRAVENOUS at 00:00

## 2022-03-13 RX ADMIN — DEXMEDETOMIDINE HYDROCHLORIDE 1.5 MCG/KG/HR: 100 INJECTION, SOLUTION INTRAVENOUS at 17:00

## 2022-03-13 RX ADMIN — HYDROCORTISONE SODIUM SUCCINATE 50 MG: 100 INJECTION, POWDER, FOR SOLUTION INTRAMUSCULAR; INTRAVENOUS at 00:00

## 2022-03-13 RX ADMIN — DEXMEDETOMIDINE HYDROCHLORIDE 1.5 MCG/KG/HR: 100 INJECTION, SOLUTION INTRAVENOUS at 03:00

## 2022-03-13 RX ADMIN — PANTOPRAZOLE SODIUM 40 MG: 40 INJECTION, POWDER, FOR SOLUTION INTRAVENOUS at 08:39

## 2022-03-13 RX ADMIN — DEXMEDETOMIDINE HYDROCHLORIDE 1.5 MCG/KG/HR: 100 INJECTION, SOLUTION INTRAVENOUS at 18:59

## 2022-03-13 RX ADMIN — INSULIN LISPRO 6 UNITS: 100 INJECTION, SOLUTION INTRAVENOUS; SUBCUTANEOUS at 21:26

## 2022-03-13 RX ADMIN — MUPIROCIN: 20 OINTMENT TOPICAL at 21:29

## 2022-03-13 RX ADMIN — ACETAMINOPHEN 650 MG: 325 TABLET ORAL at 21:28

## 2022-03-13 RX ADMIN — DOCUSATE SODIUM LIQUID 100 MG: 100 LIQUID ORAL at 08:38

## 2022-03-13 RX ADMIN — SODIUM CHLORIDE, PRESERVATIVE FREE 10 ML: 5 INJECTION INTRAVENOUS at 21:28

## 2022-03-13 RX ADMIN — ACETAMINOPHEN 650 MG: 325 TABLET ORAL at 06:03

## 2022-03-13 RX ADMIN — Medication 50 MCG/HR: at 08:55

## 2022-03-13 RX ADMIN — WATER 2000 MG: 1 INJECTION INTRAMUSCULAR; INTRAVENOUS; SUBCUTANEOUS at 13:09

## 2022-03-13 RX ADMIN — MUPIROCIN: 20 OINTMENT TOPICAL at 08:39

## 2022-03-13 RX ADMIN — INSULIN LISPRO 6 UNITS: 100 INJECTION, SOLUTION INTRAVENOUS; SUBCUTANEOUS at 16:49

## 2022-03-13 RX ADMIN — METOPROLOL TARTRATE 12.5 MG: 25 TABLET, FILM COATED ORAL at 21:27

## 2022-03-13 RX ADMIN — MICONAZOLE NITRATE: 20 POWDER TOPICAL at 08:39

## 2022-03-13 RX ADMIN — MIDAZOLAM HYDROCHLORIDE 2 MG: 1 INJECTION, SOLUTION INTRAMUSCULAR; INTRAVENOUS at 21:27

## 2022-03-13 RX ADMIN — INSULIN LISPRO 9 UNITS: 100 INJECTION, SOLUTION INTRAVENOUS; SUBCUTANEOUS at 13:16

## 2022-03-13 RX ADMIN — MIDAZOLAM HYDROCHLORIDE 2 MG: 1 INJECTION, SOLUTION INTRAMUSCULAR; INTRAVENOUS at 09:28

## 2022-03-13 RX ADMIN — MICONAZOLE NITRATE: 20 POWDER TOPICAL at 21:29

## 2022-03-13 RX ADMIN — CHLORHEXIDINE GLUCONATE 0.12% ORAL RINSE 15 ML: 1.2 LIQUID ORAL at 08:38

## 2022-03-13 RX ADMIN — QUETIAPINE FUMARATE 25 MG: 25 TABLET ORAL at 10:25

## 2022-03-13 RX ADMIN — DEXMEDETOMIDINE HYDROCHLORIDE 1.5 MCG/KG/HR: 100 INJECTION, SOLUTION INTRAVENOUS at 20:50

## 2022-03-13 RX ADMIN — MIDAZOLAM HYDROCHLORIDE 2 MG: 1 INJECTION, SOLUTION INTRAMUSCULAR; INTRAVENOUS at 17:52

## 2022-03-13 RX ADMIN — INSULIN GLARGINE 45 UNITS: 100 INJECTION, SOLUTION SUBCUTANEOUS at 21:27

## 2022-03-13 RX ADMIN — DOCUSATE SODIUM LIQUID 100 MG: 100 LIQUID ORAL at 21:33

## 2022-03-13 RX ADMIN — INSULIN GLARGINE 40 UNITS: 100 INJECTION, SOLUTION SUBCUTANEOUS at 08:33

## 2022-03-13 RX ADMIN — DEXMEDETOMIDINE HYDROCHLORIDE 1.5 MCG/KG/HR: 100 INJECTION, SOLUTION INTRAVENOUS at 14:47

## 2022-03-13 RX ADMIN — QUETIAPINE FUMARATE 50 MG: 25 TABLET ORAL at 21:28

## 2022-03-13 RX ADMIN — CHLORHEXIDINE GLUCONATE 0.12% ORAL RINSE 15 ML: 1.2 LIQUID ORAL at 21:28

## 2022-03-13 RX ADMIN — LINEZOLID 600 MG: 100 SUSPENSION ORAL at 21:29

## 2022-03-13 RX ADMIN — DEXMEDETOMIDINE HYDROCHLORIDE 1.5 MCG/KG/HR: 100 INJECTION, SOLUTION INTRAVENOUS at 01:01

## 2022-03-13 RX ADMIN — MIDAZOLAM HYDROCHLORIDE 2 MG: 1 INJECTION, SOLUTION INTRAMUSCULAR; INTRAVENOUS at 06:03

## 2022-03-13 RX ADMIN — SODIUM CHLORIDE: 9 INJECTION, SOLUTION INTRAVENOUS at 07:02

## 2022-03-13 RX ADMIN — DEXMEDETOMIDINE HYDROCHLORIDE 1.5 MCG/KG/HR: 100 INJECTION, SOLUTION INTRAVENOUS at 04:30

## 2022-03-13 RX ADMIN — HYDROCORTISONE SODIUM SUCCINATE 25 MG: 100 INJECTION, POWDER, FOR SOLUTION INTRAMUSCULAR; INTRAVENOUS at 21:28

## 2022-03-13 RX ADMIN — DEXMEDETOMIDINE HYDROCHLORIDE 1.5 MCG/KG/HR: 100 INJECTION, SOLUTION INTRAVENOUS at 08:05

## 2022-03-13 RX ADMIN — Medication 100 MCG/HR: at 19:36

## 2022-03-13 RX ADMIN — HYDROCORTISONE SODIUM SUCCINATE 25 MG: 100 INJECTION, POWDER, FOR SOLUTION INTRAMUSCULAR; INTRAVENOUS at 13:20

## 2022-03-13 RX ADMIN — DEXMEDETOMIDINE HYDROCHLORIDE 1.5 MCG/KG/HR: 100 INJECTION, SOLUTION INTRAVENOUS at 13:56

## 2022-03-13 RX ADMIN — LINEZOLID 600 MG: 100 SUSPENSION ORAL at 08:39

## 2022-03-13 ASSESSMENT — PULMONARY FUNCTION TESTS
PIF_VALUE: 34
PIF_VALUE: 54
PIF_VALUE: 40
PIF_VALUE: 45
PIF_VALUE: 52
PIF_VALUE: 36
PIF_VALUE: 37
PIF_VALUE: 34
PIF_VALUE: 41
PIF_VALUE: 38
PIF_VALUE: 36
PIF_VALUE: 40
PIF_VALUE: 49
PIF_VALUE: 37
PIF_VALUE: 37
PIF_VALUE: 46
PIF_VALUE: 35

## 2022-03-13 ASSESSMENT — PAIN SCALES - GENERAL
PAINLEVEL_OUTOF10: 0
PAINLEVEL_OUTOF10: 8

## 2022-03-13 NOTE — PROGRESS NOTES
Put patient on PS 12/40/8, patient VT dropped to 225, patient RR went up to 35. Per Dr. Reddy Jean, patient placed back on AC/VC+ settings.

## 2022-03-13 NOTE — PATIENT CARE CONFERENCE
Intensive Care Daily Quality Rounding Checklist      ICU Team Members:     ICU Day #: 3/8    Intubation Date:3/8    Ventilator Day #: 5    Central Line Insertion Date: 3/8        Day #: 5     Arterial Line Insertion Date: 3/8 no blood return      Day #: 3/8        DVT Prophylaxis: Lovenox BID    GI Prophylaxis: Protonix qday    Hansen Catheter Insertion Date: 3/8       Day #:Yes    Continued need (if yes, reason documented and discussed with physician): Yes    Skin Issues/ Wounds and ordered treatment discussed on rounds:     -Stage II coccyx w/mepilex  -Stage II R nare w/protective barrier  -BLE: swelling + dry cracked skin, flaking.   Possibly order urea cream 40% and wrap legs    Goals/ Plans for the Day:

## 2022-03-13 NOTE — PATIENT CARE CONFERENCE
Intensive Care Daily Quality Rounding Checklist        ICU Team Members: Dr. Kat Nguyễn, resident, bedside nurse, charge nurse, RT     ICU Day #: 5     Intubation Date: 3/8     Ventilator Day #: 5     Central Line Insertion Date: 3/8                                                    Day #: 5      Arterial Line Insertion Date:3/8                       Day #: 5     DVT Prophylaxis: Lovenox    GI Prophylaxis: Protonix qday     Hansen Catheter Insertion Date: 3/8                                        Day #: 5                             Continued need (if yes, reason documented and discussed with physician): Y     Skin Issues/ Wounds and ordered treatment discussed on rounds:     -Stage II coccyx w/mepilex  -Stage II L nare w/protective barrier     Goals/ Plans for the Day: Monitor labs and vitals. Wean vent/oxygen as able. Adjust Lantus. Wean steroids. Continue critical care management.

## 2022-03-13 NOTE — PROGRESS NOTES
Subjective:    Remains intubated sedated in ICU setting  No acute events overnight  Eyes partially open today   Agitated according to staff-requiring Precedex/Versed  Objective:    BP (!) 152/83   Pulse 65   Temp 100.8 °F (38.2 °C)   Resp (!) 38   Ht 5' 4\" (1.626 m)   Wt (!) 310 lb 13.6 oz (141 kg)   LMP  (LMP Unknown)   SpO2 99%   BMI 53.36 kg/m²     In: 1894 [I.V.:1368.9; NG/GT:430]  Out: 2300   In: 1894   Out: 2300 [Urine:2300]    General appearance intubated sedated, poor hygiene malodorous  HEENT: AT/NC, MMM  Neck: FROM, supple  Lungs: Clear to auscultation  CV: RRR, no MRGs  Vasc: Radial pulses 2+  Abdomen: Soft, non-tender; no masses or HSM  Extremities: Ichthyosis bilateral lower extremities-bilateral lower extremities in dressing  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  *      Plan:    55-year-old woman with multiple comorbidities admitted to ICU setting for septic shock on triple pressor support, broad-spectrum IV antibiotic therapy, intubated sedated     IV fluid resuscitation for marked lactic acidosis-improving  Off pressor support now  on Solu-Cortef 3 times daily-wean now the blood pressure is improved-contributing to marked elevation in blood sugars  -blood cultures with alpha hemolytic strep  Broad-spectrum IV antibiotics with infectious disease following-Merrem has been transitioned to Rocephin 3/9/2021-beta strep group G/ linezolid added-white count resolved  Await ruq us -hepatomegaly with diffuse fatty infiltration/gallstones and sludge  Amiodarone drip discontinued  Glargine, insulin sliding scale for blood sugar management, consider insulin drip in critically ill woman-blood sugars are still markedly elevated in the 400 range  Monitor renal function for possible prerenal failure given hypotension-improving 56/1.2  No evidence of diabetic ketoacidosis-continue to monitor  Initiation of Precedex, fentanyl and Versed for sedation/agitation    Daily labs   Discussed with nursing at bedside  DVT Prophylaxis   PT/OT  Discharge [address], [place]  1:46 PM  3/13/2022

## 2022-03-13 NOTE — PLAN OF CARE
Problem: Non-Violent Restraints  Goal: Removal from restraints as soon as assessed to be safe  Outcome: Ongoing  Goal: No harm/injury to patient while restraints in use  Outcome: Ongoing  Goal: Patient's dignity will be maintained  Outcome: Ongoing     Problem: Discharge Planning:  Goal: Participates in care planning  Description: Participates in care planning  Outcome: Ongoing  Goal: Discharged to appropriate level of care  Description: Discharged to appropriate level of care  Outcome: Ongoing     Problem: Airway Clearance - Ineffective:  Goal: Ability to maintain a clear airway will improve  Description: Ability to maintain a clear airway will improve  Outcome: Ongoing     Problem: Anxiety/Stress:  Goal: Level of anxiety will decrease  Description: Level of anxiety will decrease  Outcome: Ongoing     Problem: Aspiration:  Goal: Absence of aspiration  Description: Absence of aspiration  Outcome: Ongoing     Problem:  Bowel Function - Altered:  Goal: Bowel elimination is within specified parameters  Description: Bowel elimination is within specified parameters  Outcome: Ongoing     Problem: Cardiac Output - Decreased:  Goal: Hemodynamic stability will improve  Description: Hemodynamic stability will improve  Outcome: Ongoing     Problem: Fluid Volume - Imbalance:  Goal: Absence of imbalanced fluid volume signs and symptoms  Description: Absence of imbalanced fluid volume signs and symptoms  Outcome: Ongoing     Problem: Gas Exchange - Impaired:  Goal: Levels of oxygenation will improve  Description: Levels of oxygenation will improve  Outcome: Ongoing     Problem: Mental Status - Impaired:  Goal: Mental status will be restored to baseline  Description: Mental status will be restored to baseline  Outcome: Ongoing     Problem: Nutrition Deficit:  Goal: Ability to achieve adequate nutritional intake will improve  Description: Ability to achieve adequate nutritional intake will improve  Outcome: Ongoing     Problem: Pain:  Goal: Pain level will decrease  Description: Pain level will decrease  Outcome: Ongoing  Goal: Recognizes and communicates pain  Description: Recognizes and communicates pain  Outcome: Ongoing  Goal: Control of acute pain  Description: Control of acute pain  Outcome: Ongoing  Goal: Control of chronic pain  Description: Control of chronic pain  Outcome: Ongoing     Problem: Serum Glucose Level - Abnormal:  Goal: Ability to maintain appropriate glucose levels will improve to within specified parameters  Description: Ability to maintain appropriate glucose levels will improve to within specified parameters  Outcome: Ongoing     Problem: Skin Integrity - Impaired:  Goal: Will show no infection signs and symptoms  Description: Will show no infection signs and symptoms  Outcome: Ongoing  Goal: Absence of new skin breakdown  Description: Absence of new skin breakdown  Outcome: Ongoing     Problem: Sleep Pattern Disturbance:  Goal: Appears well-rested  Description: Appears well-rested  Outcome: Ongoing     Problem: Tissue Perfusion, Altered:  Goal: Circulatory function within specified parameters  Description: Circulatory function within specified parameters  Outcome: Ongoing     Problem: Tissue Perfusion - Cardiopulmonary, Altered:  Goal: Absence of angina  Description: Absence of angina  Outcome: Ongoing  Goal: Hemodynamic stability will improve  Description: Hemodynamic stability will improve  Outcome: Ongoing     Problem: Skin Integrity:  Goal: Will show no infection signs and symptoms  Description: Will show no infection signs and symptoms  Outcome: Ongoing  Goal: Absence of new skin breakdown  Description: Absence of new skin breakdown  Outcome: Ongoing     Problem: Falls - Risk of:  Goal: Will remain free from falls  Description: Will remain free from falls  Outcome: Ongoing  Goal: Absence of physical injury  Description: Absence of physical injury  Outcome: Ongoing

## 2022-03-13 NOTE — PROGRESS NOTES
[address] [place] [address] [facility]  SUZANNE  Progress Note    SUBJECTIVE:  Chief Complaint   Patient presents with    Cough     started a couple days ago    Hyperglycemia      per EMS    Chills    Nausea     Zofran given per EMS     No major changes. Patient remains in the ICU. She is still intubated and sedated. She is tolerating antibiotics. Review of systems:  A 10 point review of systems was done. As stated above, otherwise negative. Medications:   insulin glargine  45 Units SubCUTAneous BID    hydrocortisone sodium succinate PF  25 mg IntraVENous Q8H    QUEtiapine  25 mg Oral QAM    QUEtiapine  50 mg Oral Nightly    linezolid  600 mg Per NG tube 2 times per day    mupirocin   Nasal BID    insulin lispro  0-18 Units SubCUTAneous Q4H    docusate  100 mg Per NG tube BID    senna  10 mL Per NG tube BID    sodium chloride flush  5-40 mL IntraVENous BID    enoxaparin  1 mg/kg SubCUTAneous BID    cefTRIAXone (ROCEPHIN) IV  2,000 mg IntraVENous Q24H    chlorhexidine  15 mL Mouth/Throat BID    pantoprazole  40 mg IntraVENous Daily    miconazole   Topical BID      fentaNYL 5 mcg/ml in 0.9%  ml infusion 50 mcg/hr (22 0855)    dexmedetomidine (PRECEDEX) IV infusion 1.5 mcg/kg/hr (22 1012)    dextrose      midazolam Stopped (22 0850)    sodium chloride 20 mL/hr at 22 0702     fentanNYL, dextrose bolus (hypoglycemia) **OR** dextrose bolus (hypoglycemia), glucose, glucagon (rDNA), dextrose, midazolam, acetaminophen, acetaminophen    OBJECTIVE:  BP (!) 152/83   Pulse 80   Temp 101.7 °F (38.7 °C)   Resp 23   Ht 5' 4\" (1.626 m)   Wt (!) 310 lb 13.6 oz (141 kg)   LMP  (LMP Unknown)   SpO2 98%   BMI 53.36 kg/m²   Temp  Av.1 °F (38.4 °C)  Min: 100.6 °F (38.1 °C)  Max: 101.7 °F (38.7 °C)  Constitutional: The patient is lying even in the ICU. She is intubated and sedated. FiO2 40%. PEEP 8. No changes. Skin: Warm and dry.  No rashes were noted.   HEENT: Eyes show round, and reactive pupils. No jaundice. Moist mucous membranes, no ulcerations, no thrush. ETT. NGT  Neck: Supple to movements. No lymphadenopathy. Chest: No respiratory distress. No crackles. Cardiovascular: Heart sounds rhythmic and regular. Abdomen: Positive bowel heart sounds rhythmic and regular. Sounds to auscultation. Benign to palpation. Large and pendulous. Intertrigo in the groin area. Extremities: Both legs are wrapped in Ace. Lines: Right radial arterial line 3/8/2022. Left IJ TLC 3/8/2022.     Laboratory and Tests Review:  Lab Results   Component Value Date    WBC 9.2 03/13/2022    WBC 13.4 (H) 03/12/2022    WBC 13.9 (H) 03/11/2022    HGB 8.6 (L) 03/13/2022    HCT 28.9 (L) 03/13/2022    MCV 83.3 03/13/2022     03/13/2022     Lab Results   Component Value Date    NEUTROABS 6.81 03/13/2022    NEUTROABS 10.80 (H) 03/12/2022    NEUTROABS 12.37 (H) 03/11/2022     Lab Results   Component Value Date    CRP 42.1 (H) 03/11/2022    CRP 13.7 (H) 03/08/2022    CRP 5.3 (H) 07/05/2021     No results found for: [facility]  Lab Results   Component Value Date    SEDRATE 98 (H) 03/11/2022    SEDRATE 30 (H) 03/08/2022    SEDRATE 41 (H) 07/05/2021     Lab Results   Component Value Date     (H) 03/13/2022     (H) 03/13/2022    ALKPHOS 197 (H) 03/13/2022    BILITOT 0.3 03/13/2022     Lab Results   Component Value Date     03/13/2022    K 3.6 03/13/2022    K 5.7 03/08/2022     03/13/2022    CO2 29 03/13/2022    BUN 34 03/13/2022    CREATININE 0.8 03/13/2022    CREATININE 1.0 03/12/2022    CREATININE 1.2 03/11/2022    GFRAA >60 03/13/2022    LABGLOM >60 03/13/2022    GLUCOSE 297 03/13/2022    GLUCOSE 181 12/16/2011    PROT 6.4 03/13/2022    LABALBU 2.7 03/13/2022    LABALBU 4.5 11/02/2011    CALCIUM 8.6 03/13/2022    BILITOT 0.3 03/13/2022    ALKPHOS 197 03/13/2022     03/13/2022     03/13/2022     Radiology:      Microbiology:   Rapid SARS-CoV-2: Negative  Respiratory panel: Negative  Blood cultures 3/8/2022: Group G Streptococcus in 4 of 4 bottles  Blood cultures 3/9/2022: Negative so far  Leg wound culture 3/8/2022: Proteus, MRSA, CoNS  Nares screen MRSA: Positive    ASSESSMENT:  · Probable cellulitis right lower extremity  · Group G Streptococcus septicemia probably associated to cellulitis cultures of the leg, however, grew Proteus  · Possible intra-abdominal infection. Possible cholecystitis  · Sepsis with septic shock, improved  · Acute respiratory failure   · Leukocytosis, improving  · Elevation of transaminases. Possible cholecystitis.   Ultrasound shows gallstones and edematous gallbladder wall    PLAN:  · Continue Ceftriaxone, day 6  · Continue Linezolid, day 2  · Check repeat blood cultures  · We will follow with you  · Consider cholecystostomy tube    Spoke with nursing    Jake Frye MD  10:14 AM  3/13/2022

## 2022-03-13 NOTE — PROGRESS NOTES
Critical Care Team - Daily Progress Note         Date and time: 3/13/2022 8:49 AM  Patient's name:  [place] AG[place] [address] Record Number: [id number]  Patient's account/billing number: [de-identified]  Patient's YOB: 1955  Age: 74 y.o. Date of Admission: 3/8/2022  4:28 AM  Length of stay during current admission: 5      Primary Care Physician: Carrol Guerin DO  ICU Attending Physician: Dr. Alvarenga Single    Code Status: Full Code    Reason for ICU admission: Respiratory failure, shock      SUBJECTIVE:     OVERNIGHT EVENTS:         3/13: Patient did not experience any acute events overnight. She has been very agitated however and trying to pull at tube. She is still on pressors at this time. 3/12: Pressors decreased. Mentation has not been appropriate yet. Tolerating tube feeds. 3/11: Patient had no acute problems overnight. She is currently being weaned off of vaso and weaned off sedation. She has been persistently hyperglycemic. 3/10: Pressors were changed from levo to rachelle. She would awake intermittently and be agitated. Precedex was started. Patient is tolerating tube feeds as well. 3/9: Patient has converted to sinus rhythm, opening eyes with spells of anxious awakenings. Levophed stopped. Remains on Vent. 3/8: Patient presented to the ICU with hypotension and respiratory failure. Patient was started on pressors and antibiotics at that time. Patient was also started on amiodarone drip for her A. fib. Patient was also started on insulin drip.     CURRENT VENTILATION STATUS:     [x] Ventilator  [] BIPAP  [] Nasal Cannula [] Room Air      IF INTUBATED, ET TUBE MARKING AT LOWER LIP:  cms    SECRETIONS  Amount:  [x] Small [] Moderate  [] Large  [] None  Color:     [] White [] Colored  [] Bloody    SEDATION:  RAAS Score:  [] Propofol gtt  [] Versed gtt  [] Fentanyl gtt [] Ativan gtt   [x] Precedex    PARALYZED:  [x] No    [] Yes      VASOPRESSORS:  [x] No    [x] Yes    If yes -   [] Levophed       [] Dopamine     [] Vasopressin       [] Dobutamine  [] Phenylephrine         [] Epinephrine    CENTRAL LINES:     [] No   [x] Yes   (Date of Insertion: 3/8)           If yes -     [x] Right IJ     [] Left IJ [] Right Femoral [] Left Femoral                   [] Right Subclavian [] Left Subclavian       BRAUN'S CATHETER:   [] No   [x] Yes  (Date of Insertion: )     URINE OUTPUT:            [x] Good   [] Low              [] Anuric      OBJECTIVE:     VITAL SIGNS:  BP (!) 152/83   Pulse 67   Temp 100.9 °F (38.3 °C) (Bladder)   Resp 21   Ht 5' 4\" (1.626 m)   Wt (!) 310 lb 13.6 oz (141 kg)   LMP  (LMP Unknown)   SpO2 98%   BMI 53.36 kg/m²   Tmax over 24 hours:  Temp (24hrs), Av °F (38.3 °C), Min:100.6 °F (38.1 °C), Max:101.3 °F (38.5 °C)      Patient Vitals for the past 6 hrs:   Temp Temp src Pulse Resp SpO2 Weight   22 0700 -- -- 67 21 98 % --   22 0600 100.9 °F (38.3 °C) Bladder 80 18 97 % --   22 0500 -- -- 81 21 100 % --   22 0400 100.8 °F (38.2 °C) Bladder 83 27 99 % (!) 310 lb 13.6 oz (141 kg)   22 0356 -- -- 69 16 99 % --   22 0300 -- -- 74 17 -- --         Intake/Output Summary (Last 24 hours) at 3/13/2022 0849  Last data filed at 3/13/2022 3875  Gross per 24 hour   Intake 1893.99 ml   Output 1975 ml   Net -81.01 ml     Wt Readings from Last 2 Encounters:   22 (!) 310 lb 13.6 oz (141 kg)   21 (!) 304 lb (137.9 kg)     Body mass index is 53.36 kg/m². PHYSICAL EXAMINATION:    Physical Exam  Constitutional:       Appearance: She is morbidly obese. She is ill-appearing. Interventions: She is sedated and intubated. HENT:      Head: Normocephalic and atraumatic. Eyes:      Conjunctiva/sclera: Conjunctivae normal.   Neck:      Trachea: No tracheal deviation. Cardiovascular:      Rate and Rhythm: Normal rate and regular rhythm. Heart sounds: Normal heart sounds.    Pulmonary:      Effort: Pulmonary effort is normal. She is intubated. Breath sounds: Decreased breath sounds present. Abdominal:      General: Bowel sounds are normal.      Palpations: Abdomen is soft. Tenderness: There is no abdominal tenderness. Musculoskeletal:      Cervical back: Neck supple. Right lower leg: Edema present. Left lower leg: Edema present. Lymphadenopathy:      Cervical: No cervical adenopathy. Skin:     General: Skin is warm and dry. Findings: No rash. Comments: +bl lower extremity lymphedema chronic skin changes with right leg fissure erythma   Neurological:      General: No focal deficit present.    Psychiatric:         Behavior: Behavior normal.      Any additional physical findings: n/a    MEDICATIONS:    Scheduled Meds:   insulin glargine  40 Units SubCUTAneous BID    linezolid  600 mg Per NG tube 2 times per day    mupirocin   Nasal BID    insulin lispro  0-18 Units SubCUTAneous Q4H    hydrocortisone sodium succinate PF  50 mg IntraVENous Q8H    docusate  100 mg Per NG tube BID    senna  10 mL Per NG tube BID    sodium chloride flush  5-40 mL IntraVENous BID    enoxaparin  1 mg/kg SubCUTAneous BID    cefTRIAXone (ROCEPHIN) IV  2,000 mg IntraVENous Q24H    chlorhexidine  15 mL Mouth/Throat BID    pantoprazole  40 mg IntraVENous Daily    miconazole   Topical BID     Continuous Infusions:   dexmedetomidine (PRECEDEX) IV infusion 1.5 mcg/kg/hr (03/13/22 0805)    fentaNYL 5 mcg/ml in 0.9%  ml infusion Stopped (03/11/22 0922)    dextrose      midazolam Stopped (03/11/22 0850)    sodium chloride 20 mL/hr at 03/13/22 0702     PRN Meds:   fentanNYL, 25 mcg, Q2H PRN  dextrose bolus (hypoglycemia), 125 mL, PRN   Or  dextrose bolus (hypoglycemia), 250 mL, PRN  glucose, 15 g, PRN  glucagon (rDNA), 1 mg, PRN  dextrose, 100 mL/hr, PRN  midazolam, 2 mg, Q3H PRN  acetaminophen, 650 mg, Q4H PRN  acetaminophen, 650 mg, Q4H PRN          VENT SETTINGS (Comprehensive) (if applicable):  Vent Information  $Ventilation: $Subsequent Day  Skin Assessment: Clean, dry, & intact  Equipment ID: [id number]  Vent Type: [phone]  Vent Mode: AC/VC  Vt Ordered: 420 mL  Rate Set: 16 bmp  Peak Flow: 0 L/min  Pressure Support: 0 cmH20  FiO2 : 35 %  SpO2: 98 %  SpO2/FiO2 ratio: 280  Sensitivity: 2  PEEP/CPAP: 8  I Time/ I Time %: 0 s  Humidification Source: Heated wire  Humidification Temp: 37  Humidification Temp Measured: 37  Circuit Condensation: Drained  Additional Respiratory  Assessments  Pulse: 67  Resp: 21  SpO2: 98 %  Position: Semi-Chapin's  Humidification Source: Heated wire  Humidification Temp: 37  Circuit Condensation: Drained  Oral Care: Mouthwash with chlorhexidine,Lip moisturizer applied,Mouth swabbed,Mouth moisturizer,Mouth suctioned,Suction toothette  Subglottic Suction Done?: Yes  Cuff Pressure (cm H2O): 29 cm H2O    ABGs:   Recent Labs     03/12/22  1008   PH 7.448   PCO2 43.4   PO2 94.5   HCO3 29.4*   BE 4.8*   O2SAT 97.1       Laboratory findings:    Complete Blood Count:   Recent Labs     03/11/22  0548 03/12/22  0600 03/13/22  0558   WBC 13.9* 13.4* 9.2   HGB 8.1* 8.4* 8.6*   HCT 26.5* 28.1* 28.9*    161 206        Last 3 Blood Glucose:   Recent Labs     03/11/22  0548 03/12/22  0600 03/13/22  0558   GLUCOSE 370* 230* 297*        PT/INR:    Lab Results   Component Value Date    PROTIME 19.5 04/29/2021    INR 1.8 04/29/2021     PTT:    Lab Results   Component Value Date    APTT 36.3 04/29/2021       Comprehensive Metabolic Profile:   Recent Labs     03/11/22  0548 03/12/22  0600 03/12/22  0600 03/13/22  0558    139  --  139   K 4.0 3.6  --  3.6   CL 99 102  --  102   CO2 26 27  --  29   BUN 56* 44*  --  34*   CREATININE 1.2* 1.0  --  0.8   GLUCOSE 370* 230*  --  297*   CALCIUM 8.5* 8.4*  --  8.6   PROT  --  6.1*   < > 6.4   LABALBU  --  2.3*   < > 2.7*   BILITOT  --  0.3   < > 0.3   ALKPHOS  --  157*   < > 197*   AST  --  147*   < > 137*   ALT  --  278*   < > 271*    < > = values in this interval not displayed. Magnesium:   Lab Results   Component Value Date    MG 2.2 03/13/2022     Phosphorus:   Lab Results   Component Value Date    PHOS 2.3 03/13/2022     Ionized Calcium:   Lab Results   Component Value Date    CAION 1.37 10/24/2016        Urinalysis: No results found for: UA     Troponin: No results for input(s): TROPONINI in the last 72 hours. Microbiology:   Culture, Blood 2   Date Value Ref Range Status   03/09/2022 24 Hours no growth  Preliminary       Cultures during this admission:     Blood cultures:  [] None drawn      [x] Negative             [x]  Positive (Details: 3/8 beta strep group G, repeats 3/9 negative)  Urine Culture:   [] None drawn      [x] Negative             []  Positive (Details:  )  Sputum Culture:  [x] None drawn      [] Negative             []  Positive (Details:  )   Endotracheal aspirate: [x] None drawn      [] Negative             []  Positive (Details:  )     Other pertinent Labs:        Radiology/Imaging:     Chest Xray (3/13/2022): No significant change.  Continued follow-up recommended. ASSESSMENT:     Principal Problem:    Sepsis ([address]  Resolved Problems:    * No resolved hospital problems. *      PLAN:     WEAN PER PROTOCOL:  [] No   [x] Yes  [] N/A    DISCONTINUE ANY LABS:   [x] No   [] Yes    ICU PROPHYLAXIS:  Stress ulcer:  [x] PPI Agent  [] [id number] [] Sucralfate  [] Other:  VTE:   [] Enoxaparin  [] Unfract. Heparin Subcut  [] EPC Cuffs    NUTRITION:  [x] NPO [x] Tube Feeding (Specify: ) [] TPN  [] PO (Diet: Diet NPO  ADULT TUBE FEEDING; Nasogastric; Diabetic; Continuous; 10; Yes; 15; Q 8 hours; 50; 30;  Q 6 hours)    HOME MEDICATIONS RECONCILED: [] No  [x] Yes    INSULIN DRIP:   [x] No   [] Yes    CONSULTATION NEEDED:  [x] No   [] Yes    FAMILY UPDATED:    [] No   [x] Yes    TRANSFER OUT OF ICU:   [x] No   [] Yes    SYSTEMS ASSESSMENT    Neuro     Intubated, sedated  Fentanyl, Versed, Precedex  Agitated at times    Agitation  Seroquel 25 mg in AM, Seroquel 50 mg in PM  Precedex continued    Respiratory     Intubated day 6  AC/VC, FiO2 35, vet set 420, rate 16, PEEP 8  Wean oxygen as tolerated. Keep O2 sat 90-92%    Cardiovascular     Paroxysmal atrial fibrillation  Off amnio drip  Continue to monitor  Therapeutic Lovenox    Septic shock  Solu-Cortef 25 mg Q8H  Blood cultures positive Beta strep group G 3/8, repeats negative  Wound culture 3/9 Staph aureus  Patient febrile  Off pressors  See infectious disease below    Gastrointestinal     GI PPx  Protonix 40 mg IV daily    Bowel Regimen  Colace 100 mg BID  Senna daily    Renal     CKD stage III, stable  Baseline creatinine 1.1-1.2  Continue to monitor    Electrolyte abnormalities  Replete as needed  Continue to monitor     Infectious Disease     Gram-positive bacteremia  Blood cultures positive x2 for beta strep group G  Ceftriaxone (day 5)  Remains febrile  Off pressors  ID following, appreciate input    Leukocytosis, improving  Likely secondary to infection and steroid use  Remains febrile  Continue to monitor    Febrile  Tylenol suppository every 4 hours as needed for fever    Hematology/Oncology     Lovenox 135 mg twice daily    Endocrine     T2DM  Lantus to 45 units twice daily  HDSSI  Hypoglycemia protocol  POC glucose checks    Social/Spiritual/DNR/Other     Full code    Niranjan Hernandez DO, PGY-1            3/13/2022, 8:49 AM      I personally saw, examined and provided care for the patient. Radiographs, labs and medication list were reviewed by me independently. Review of Residents documentation was conducted and revisions were made as appropriate. I agree with the above documented exam, problem list and plan of care. Assessment:     1. Shock, multifactorial - shock resolved  2. Severe sepsis with bacteremia, Streptococcus  3. Right lower extremity cellulitis  4. A. fib with RVR -now NSR  5.  Acute on chronic respiratory failure requiring mechanical ventilation  6. chronic lymphedema  7. Morbid obesity BMI 51  8. Chronic diastolic heart failure  9.  Medical noncompliance        Consultants: ID     · PSV as tolerated  · Fentanyl, Precedex, wean as tolerated  · Start Seroquel  · Antibiotic with ceftriaxone  · Start low-dose beta-blocker  · Decrease Solu-Cortef   25 every 8  · Tube feeds, bowel regiment  · Start Lantus  45 every 12, ISS     GI/DVT - SUP/Lovenox full anticoagulation 1 mg/kilogram twice daily      CCT excluding procedures 35 minutes    Geno Schultz, DO

## 2022-03-14 ENCOUNTER — APPOINTMENT (OUTPATIENT)
Dept: GENERAL RADIOLOGY | Age: 67
DRG: 870 | End: 2022-03-14
Payer: MEDICARE

## 2022-03-14 LAB
ALBUMIN SERPL-MCNC: 2.4 G/DL (ref 3.5–5.2)
ALP BLD-CCNC: 149 U/L (ref 35–104)
ALT SERPL-CCNC: 185 U/L (ref 0–32)
ANION GAP SERPL CALCULATED.3IONS-SCNC: 8 MMOL/L (ref 7–16)
AST SERPL-CCNC: 57 U/L (ref 0–31)
B.E.: 6.7 MMOL/L (ref -3–3)
BASOPHILS ABSOLUTE: 0 E9/L (ref 0–0.2)
BASOPHILS RELATIVE PERCENT: 0 % (ref 0–2)
BILIRUB SERPL-MCNC: 0.3 MG/DL (ref 0–1.2)
BLOOD CULTURE, ROUTINE: NORMAL
BUN BLDV-MCNC: 24 MG/DL (ref 6–23)
CALCIUM SERPL-MCNC: 8.4 MG/DL (ref 8.6–10.2)
CHLORIDE BLD-SCNC: 104 MMOL/L (ref 98–107)
CO2: 31 MMOL/L (ref 22–29)
CREAT SERPL-MCNC: 0.7 MG/DL (ref 0.5–1)
CULTURE, BLOOD 2: NORMAL
DELIVERY SYSTEMS: ABNORMAL
DEVICE: ABNORMAL
EOSINOPHILS ABSOLUTE: 0.23 E9/L (ref 0.05–0.5)
EOSINOPHILS RELATIVE PERCENT: 2.6 % (ref 0–6)
FIO2: 35
GFR AFRICAN AMERICAN: >60
GFR NON-AFRICAN AMERICAN: >60 ML/MIN/1.73
GLUCOSE BLD-MCNC: 156 MG/DL (ref 74–99)
HCO3: 32.1 MMOL/L (ref 22–26)
HCT VFR BLD CALC: 28.2 % (ref 34–48)
HEMOGLOBIN: 8.3 G/DL (ref 11.5–15.5)
HYPOCHROMIA: ABNORMAL
LACTIC ACID: 1.1 MMOL/L (ref 0.5–2.2)
LYMPHOCYTES ABSOLUTE: 1.58 E9/L (ref 1.5–4)
LYMPHOCYTES RELATIVE PERCENT: 17.6 % (ref 20–42)
MAGNESIUM: 1.9 MG/DL (ref 1.6–2.6)
MCH RBC QN AUTO: 24.9 PG (ref 26–35)
MCHC RBC AUTO-ENTMCNC: 29.4 % (ref 32–34.5)
MCV RBC AUTO: 84.4 FL (ref 80–99.9)
METAMYELOCYTES RELATIVE PERCENT: 1.8 % (ref 0–1)
METER GLUCOSE: 122 MG/DL (ref 74–99)
METER GLUCOSE: 134 MG/DL (ref 74–99)
METER GLUCOSE: 151 MG/DL (ref 74–99)
METER GLUCOSE: 158 MG/DL (ref 74–99)
METER GLUCOSE: 165 MG/DL (ref 74–99)
METER GLUCOSE: 98 MG/DL (ref 74–99)
MODE: AC
MONOCYTES ABSOLUTE: 0.53 E9/L (ref 0.1–0.95)
MONOCYTES RELATIVE PERCENT: 6.1 % (ref 2–12)
NEUTROPHILS ABSOLUTE: 6.51 E9/L (ref 1.8–7.3)
NEUTROPHILS RELATIVE PERCENT: 71.9 % (ref 43–80)
NUCLEATED RED BLOOD CELLS: 0.9 /100 WBC
O2 SATURATION: 99.9 % (ref 92–98.5)
OPERATOR ID: 420
PATIENT TEMP: 37
PCO2 (TEMP CORRECTED): 49.8 MMHG (ref 35–45)
PDW BLD-RTO: 15.8 FL (ref 11.5–15)
PH (TEMPERATURE CORRECTED): 7.42 (ref 7.35–7.45)
PHOSPHORUS: 2.4 MG/DL (ref 2.5–4.5)
PLATELET # BLD: 237 E9/L (ref 130–450)
PMV BLD AUTO: 11.2 FL (ref 7–12)
PO2 (TEMP CORRECTED): 295.6 MMHG (ref 60–80)
POC SOURCE: ABNORMAL
POSITIVE END EXP PRESS: 8 CMH2O
POTASSIUM SERPL-SCNC: 3.4 MMOL/L (ref 3.5–5)
RBC # BLD: 3.34 E12/L (ref 3.5–5.5)
RESPIRATORY RATE: 16 B/MIN
SODIUM BLD-SCNC: 143 MMOL/L (ref 132–146)
TIDAL VOLUME: 420 ML
TOTAL PROTEIN: 6.1 G/DL (ref 6.4–8.3)
WBC # BLD: 8.8 E9/L (ref 4.5–11.5)

## 2022-03-14 PROCEDURE — 2580000003 HC RX 258: Performed by: STUDENT IN AN ORGANIZED HEALTH CARE EDUCATION/TRAINING PROGRAM

## 2022-03-14 PROCEDURE — 36592 COLLECT BLOOD FROM PICC: CPT

## 2022-03-14 PROCEDURE — 2500000003 HC RX 250 WO HCPCS: Performed by: STUDENT IN AN ORGANIZED HEALTH CARE EDUCATION/TRAINING PROGRAM

## 2022-03-14 PROCEDURE — 83605 ASSAY OF LACTIC ACID: CPT

## 2022-03-14 PROCEDURE — 71045 X-RAY EXAM CHEST 1 VIEW: CPT

## 2022-03-14 PROCEDURE — 36415 COLL VENOUS BLD VENIPUNCTURE: CPT

## 2022-03-14 PROCEDURE — 6360000002 HC RX W HCPCS: Performed by: INTERNAL MEDICINE

## 2022-03-14 PROCEDURE — 85025 COMPLETE CBC W/AUTO DIFF WBC: CPT

## 2022-03-14 PROCEDURE — 6360000002 HC RX W HCPCS: Performed by: SPECIALIST

## 2022-03-14 PROCEDURE — 2580000003 HC RX 258: Performed by: SPECIALIST

## 2022-03-14 PROCEDURE — 6370000000 HC RX 637 (ALT 250 FOR IP): Performed by: STUDENT IN AN ORGANIZED HEALTH CARE EDUCATION/TRAINING PROGRAM

## 2022-03-14 PROCEDURE — 2580000003 HC RX 258: Performed by: INTERNAL MEDICINE

## 2022-03-14 PROCEDURE — 83735 ASSAY OF MAGNESIUM: CPT

## 2022-03-14 PROCEDURE — 84100 ASSAY OF PHOSPHORUS: CPT

## 2022-03-14 PROCEDURE — 94003 VENT MGMT INPAT SUBQ DAY: CPT

## 2022-03-14 PROCEDURE — 2000000000 HC ICU R&B

## 2022-03-14 PROCEDURE — 2500000003 HC RX 250 WO HCPCS: Performed by: INTERNAL MEDICINE

## 2022-03-14 PROCEDURE — 6360000002 HC RX W HCPCS: Performed by: STUDENT IN AN ORGANIZED HEALTH CARE EDUCATION/TRAINING PROGRAM

## 2022-03-14 PROCEDURE — 82962 GLUCOSE BLOOD TEST: CPT

## 2022-03-14 PROCEDURE — 80053 COMPREHEN METABOLIC PANEL: CPT

## 2022-03-14 PROCEDURE — 6370000000 HC RX 637 (ALT 250 FOR IP): Performed by: INTERNAL MEDICINE

## 2022-03-14 PROCEDURE — C9113 INJ PANTOPRAZOLE SODIUM, VIA: HCPCS | Performed by: INTERNAL MEDICINE

## 2022-03-14 PROCEDURE — 82803 BLOOD GASES ANY COMBINATION: CPT

## 2022-03-14 PROCEDURE — 36600 WITHDRAWAL OF ARTERIAL BLOOD: CPT

## 2022-03-14 RX ORDER — POTASSIUM CHLORIDE 7.45 MG/ML
10 INJECTION INTRAVENOUS
Status: DISCONTINUED | OUTPATIENT
Start: 2022-03-14 | End: 2022-03-14

## 2022-03-14 RX ORDER — POTASSIUM CHLORIDE 29.8 MG/ML
40 INJECTION INTRAVENOUS ONCE
Status: COMPLETED | OUTPATIENT
Start: 2022-03-14 | End: 2022-03-14

## 2022-03-14 RX ORDER — MAGNESIUM SULFATE IN WATER 40 MG/ML
2000 INJECTION, SOLUTION INTRAVENOUS ONCE
Status: COMPLETED | OUTPATIENT
Start: 2022-03-14 | End: 2022-03-14

## 2022-03-14 RX ORDER — QUETIAPINE FUMARATE 25 MG/1
50 TABLET, FILM COATED ORAL 2 TIMES DAILY
Status: DISCONTINUED | OUTPATIENT
Start: 2022-03-14 | End: 2022-03-21

## 2022-03-14 RX ADMIN — METOPROLOL TARTRATE 12.5 MG: 25 TABLET, FILM COATED ORAL at 08:26

## 2022-03-14 RX ADMIN — HYDROCORTISONE SODIUM SUCCINATE 25 MG: 100 INJECTION, POWDER, FOR SOLUTION INTRAMUSCULAR; INTRAVENOUS at 05:30

## 2022-03-14 RX ADMIN — METOPROLOL TARTRATE 12.5 MG: 25 TABLET, FILM COATED ORAL at 20:48

## 2022-03-14 RX ADMIN — INSULIN LISPRO 3 UNITS: 100 INJECTION, SOLUTION INTRAVENOUS; SUBCUTANEOUS at 12:19

## 2022-03-14 RX ADMIN — DEXMEDETOMIDINE HYDROCHLORIDE 1.5 MCG/KG/HR: 100 INJECTION, SOLUTION INTRAVENOUS at 23:03

## 2022-03-14 RX ADMIN — DEXMEDETOMIDINE HYDROCHLORIDE 1 MCG/KG/HR: 100 INJECTION, SOLUTION INTRAVENOUS at 09:44

## 2022-03-14 RX ADMIN — MAGNESIUM SULFATE HEPTAHYDRATE 2000 MG: 40 INJECTION, SOLUTION INTRAVENOUS at 10:43

## 2022-03-14 RX ADMIN — DOCUSATE SODIUM LIQUID 100 MG: 100 LIQUID ORAL at 08:24

## 2022-03-14 RX ADMIN — MICONAZOLE NITRATE: 20 POWDER TOPICAL at 20:47

## 2022-03-14 RX ADMIN — QUETIAPINE FUMARATE 50 MG: 25 TABLET ORAL at 20:48

## 2022-03-14 RX ADMIN — CHLORHEXIDINE GLUCONATE 0.12% ORAL RINSE 15 ML: 1.2 LIQUID ORAL at 20:47

## 2022-03-14 RX ADMIN — MIDAZOLAM HYDROCHLORIDE 2 MG: 1 INJECTION, SOLUTION INTRAMUSCULAR; INTRAVENOUS at 03:50

## 2022-03-14 RX ADMIN — POTASSIUM CHLORIDE 40 MEQ: 29.8 INJECTION, SOLUTION INTRAVENOUS at 08:14

## 2022-03-14 RX ADMIN — Medication 150 MCG/HR: at 00:39

## 2022-03-14 RX ADMIN — DEXMEDETOMIDINE HYDROCHLORIDE 1.5 MCG/KG/HR: 100 INJECTION, SOLUTION INTRAVENOUS at 18:46

## 2022-03-14 RX ADMIN — MUPIROCIN: 20 OINTMENT TOPICAL at 08:28

## 2022-03-14 RX ADMIN — ACETAMINOPHEN 650 MG: 325 TABLET ORAL at 09:12

## 2022-03-14 RX ADMIN — MIDAZOLAM HYDROCHLORIDE 2 MG: 1 INJECTION, SOLUTION INTRAMUSCULAR; INTRAVENOUS at 00:50

## 2022-03-14 RX ADMIN — MUPIROCIN: 20 OINTMENT TOPICAL at 20:47

## 2022-03-14 RX ADMIN — HYDROCORTISONE SODIUM SUCCINATE 25 MG: 100 INJECTION, POWDER, FOR SOLUTION INTRAMUSCULAR; INTRAVENOUS at 21:30

## 2022-03-14 RX ADMIN — ENOXAPARIN SODIUM 135 MG: 150 INJECTION SUBCUTANEOUS at 08:35

## 2022-03-14 RX ADMIN — CHLORHEXIDINE GLUCONATE 0.12% ORAL RINSE 15 ML: 1.2 LIQUID ORAL at 08:51

## 2022-03-14 RX ADMIN — INSULIN LISPRO 3 UNITS: 100 INJECTION, SOLUTION INTRAVENOUS; SUBCUTANEOUS at 05:13

## 2022-03-14 RX ADMIN — LINEZOLID 600 MG: 100 SUSPENSION ORAL at 20:49

## 2022-03-14 RX ADMIN — Medication 200 MCG/HR: at 08:23

## 2022-03-14 RX ADMIN — DEXMEDETOMIDINE HYDROCHLORIDE 1 MCG/KG/HR: 100 INJECTION, SOLUTION INTRAVENOUS at 13:25

## 2022-03-14 RX ADMIN — DEXMEDETOMIDINE HYDROCHLORIDE 1.5 MCG/KG/HR: 100 INJECTION, SOLUTION INTRAVENOUS at 03:30

## 2022-03-14 RX ADMIN — DEXMEDETOMIDINE HYDROCHLORIDE 1.5 MCG/KG/HR: 100 INJECTION, SOLUTION INTRAVENOUS at 07:44

## 2022-03-14 RX ADMIN — HYDROCORTISONE SODIUM SUCCINATE 25 MG: 100 INJECTION, POWDER, FOR SOLUTION INTRAMUSCULAR; INTRAVENOUS at 13:19

## 2022-03-14 RX ADMIN — SENNOSIDES 17.6 MG: 8.8 SYRUP ORAL at 20:47

## 2022-03-14 RX ADMIN — ENOXAPARIN SODIUM 135 MG: 150 INJECTION SUBCUTANEOUS at 20:48

## 2022-03-14 RX ADMIN — PANTOPRAZOLE SODIUM 40 MG: 40 INJECTION, POWDER, FOR SOLUTION INTRAVENOUS at 08:25

## 2022-03-14 RX ADMIN — DEXMEDETOMIDINE HYDROCHLORIDE 1.5 MCG/KG/HR: 100 INJECTION, SOLUTION INTRAVENOUS at 20:46

## 2022-03-14 RX ADMIN — SODIUM CHLORIDE, PRESERVATIVE FREE 10 ML: 5 INJECTION INTRAVENOUS at 20:49

## 2022-03-14 RX ADMIN — LINEZOLID 600 MG: 100 SUSPENSION ORAL at 08:36

## 2022-03-14 RX ADMIN — DOCUSATE SODIUM LIQUID 100 MG: 100 LIQUID ORAL at 20:47

## 2022-03-14 RX ADMIN — Medication 200 MCG/HR: at 21:32

## 2022-03-14 RX ADMIN — INSULIN GLARGINE 45 UNITS: 100 INJECTION, SOLUTION SUBCUTANEOUS at 08:27

## 2022-03-14 RX ADMIN — Medication 2 MG/HR: at 05:56

## 2022-03-14 RX ADMIN — Medication 200 MCG/HR: at 14:57

## 2022-03-14 RX ADMIN — ACETAMINOPHEN 650 MG: 325 TABLET ORAL at 16:55

## 2022-03-14 RX ADMIN — MICONAZOLE NITRATE: 20 POWDER TOPICAL at 08:28

## 2022-03-14 RX ADMIN — INSULIN GLARGINE 45 UNITS: 100 INJECTION, SOLUTION SUBCUTANEOUS at 20:59

## 2022-03-14 RX ADMIN — WATER 2000 MG: 1 INJECTION INTRAMUSCULAR; INTRAVENOUS; SUBCUTANEOUS at 13:19

## 2022-03-14 RX ADMIN — QUETIAPINE FUMARATE 50 MG: 25 TABLET ORAL at 08:53

## 2022-03-14 RX ADMIN — SENNOSIDES 17.6 MG: 8.8 SYRUP ORAL at 08:26

## 2022-03-14 RX ADMIN — DEXMEDETOMIDINE HYDROCHLORIDE 1.5 MCG/KG/HR: 100 INJECTION, SOLUTION INTRAVENOUS at 01:03

## 2022-03-14 RX ADMIN — DEXMEDETOMIDINE HYDROCHLORIDE 1.5 MCG/KG/HR: 100 INJECTION, SOLUTION INTRAVENOUS at 15:58

## 2022-03-14 RX ADMIN — INSULIN LISPRO 3 UNITS: 100 INJECTION, SOLUTION INTRAVENOUS; SUBCUTANEOUS at 08:27

## 2022-03-14 RX ADMIN — POTASSIUM PHOSPHATE, MONOBASIC AND POTASSIUM PHOSPHATE, DIBASIC 20 MMOL: 224; 236 INJECTION, SOLUTION, CONCENTRATE INTRAVENOUS at 13:24

## 2022-03-14 RX ADMIN — INSULIN LISPRO 3 UNITS: 100 INJECTION, SOLUTION INTRAVENOUS; SUBCUTANEOUS at 02:08

## 2022-03-14 ASSESSMENT — PULMONARY FUNCTION TESTS
PIF_VALUE: 46
PIF_VALUE: 51
PIF_VALUE: 47
PIF_VALUE: 44
PIF_VALUE: 46
PIF_VALUE: 42
PIF_VALUE: 39
PIF_VALUE: 50
PIF_VALUE: 43
PIF_VALUE: 51
PIF_VALUE: 42
PIF_VALUE: 53
PIF_VALUE: 47
PIF_VALUE: 53
PIF_VALUE: 53
PIF_VALUE: 41
PIF_VALUE: 55
PIF_VALUE: 46
PIF_VALUE: 48
PIF_VALUE: 41
PIF_VALUE: 48
PIF_VALUE: 47
PIF_VALUE: 50
PIF_VALUE: 43
PIF_VALUE: 47
PIF_VALUE: 47
PIF_VALUE: 42
PIF_VALUE: 46
PIF_VALUE: 44
PIF_VALUE: 38

## 2022-03-14 ASSESSMENT — PAIN SCALES - GENERAL
PAINLEVEL_OUTOF10: 8
PAINLEVEL_OUTOF10: 0
PAINLEVEL_OUTOF10: 0

## 2022-03-14 NOTE — CARE COORDINATION
Social Work:    Reviewed chart notes and received an updated from unit RN. Patient remains status quo. Plan to attempt to wean off ventalator will take place today. Patient admitted from [facility]. Will need to confirm discharge plans.      Electronically signed by JADEN Bose on 3/14/2022 at 1:45 PM

## 2022-03-14 NOTE — PROGRESS NOTES
[address] [place] [address] [facility]  SUZANNE  Progress Note    SUBJECTIVE:  Chief Complaint   Patient presents with    Cough     started a couple days ago    Hyperglycemia      per EMS    Chills    Nausea     Zofran given per EMS     The patient is still in the ICU. She is still intubated and sedated. She is difficult to wean. She is tolerating antibiotics. She is still having low-grade fevers. Review of systems:  A 10 point review of systems was done. As stated above, otherwise negative.     Medications:   potassium phosphate IVPB  20 mmol IntraVENous Once    QUEtiapine  50 mg Oral BID    magnesium sulfate  2,000 mg IntraVENous Once    insulin glargine  45 Units SubCUTAneous BID    hydrocortisone sodium succinate PF  25 mg IntraVENous Q8H    metoprolol tartrate  12.5 mg Oral BID    linezolid  600 mg Per NG tube 2 times per day    mupirocin   Nasal BID    insulin lispro  0-18 Units SubCUTAneous Q4H    docusate  100 mg Per NG tube BID    senna  10 mL Per NG tube BID    sodium chloride flush  5-40 mL IntraVENous BID    enoxaparin  1 mg/kg SubCUTAneous BID    cefTRIAXone (ROCEPHIN) IV  2,000 mg IntraVENous Q24H    chlorhexidine  15 mL Mouth/Throat BID    pantoprazole  40 mg IntraVENous Daily    miconazole   Topical BID      fentaNYL 5 mcg/ml in 0.9%  ml infusion 200 mcg/hr (22 0823)    dexmedetomidine (PRECEDEX) IV infusion 1 mcg/kg/hr (22 0944)    dextrose      midazolam 2 mg/hr (22 0556)    sodium chloride 20 mL/hr at 22 2000     white petrolatum, fentanNYL, dextrose bolus (hypoglycemia) **OR** dextrose bolus (hypoglycemia), glucose, glucagon (rDNA), dextrose, midazolam, acetaminophen, acetaminophen    OBJECTIVE:  BP (!) 144/75   Pulse 67   Temp 101.3 °F (38.5 °C) (Bladder)   Resp 21   Ht 5' 4\" (1.626 m)   Wt (!) 330 lb 3.2 oz (149.8 kg)   LMP  (LMP Unknown)   SpO2 97%   BMI 56.68 kg/m²   Temp  Av.8 °F (38.2 °C)  Min: 98.6 °F (37 °C)  Max: 101.5 °F (38.6 °C)  Constitutional: The patient is lying even in the ICU. She is intubated and sedated. Opens eyes. FiO2 35%. PEEP 8. Skin: Warm and dry. No rashes were noted. HEENT: Eyes show round, and reactive pupils. No jaundice. Moist mucous membranes, no ulcerations, no thrush. ETT. NGT  Neck: Supple to movements. No lymphadenopathy. Chest: No respiratory distress. No crackles. Cardiovascular: Heart sounds rhythmic and regular. Abdomen: Positive bowel heart sounds rhythmic and regular. Sounds to auscultation. Benign to palpation. Large and pendulous. Intertrigo in the groin area. Extremities: Both legs are wrapped in Ace. Lines: Right radial arterial line 3/8/2022. Left IJ TLC 3/8/2022.     Laboratory and Tests Review:  Lab Results   Component Value Date    WBC 8.8 03/14/2022    WBC 9.2 03/13/2022    WBC 13.4 (H) 03/12/2022    HGB 8.3 (L) 03/14/2022    HCT 28.2 (L) 03/14/2022    MCV 84.4 03/14/2022     03/14/2022     Lab Results   Component Value Date    NEUTROABS 6.51 03/14/2022    NEUTROABS 6.81 03/13/2022    NEUTROABS 10.80 (H) 03/12/2022     Lab Results   Component Value Date    CRP 42.1 (H) 03/11/2022    CRP 13.7 (H) 03/08/2022    CRP 5.3 (H) 07/05/2021     No results found for: CRPHS  Lab Results   Component Value Date    SEDRATE 98 (H) 03/11/2022    SEDRATE 30 (H) 03/08/2022    SEDRATE 41 (H) 07/05/2021     Lab Results   Component Value Date     (H) 03/14/2022    AST 57 (H) 03/14/2022    ALKPHOS 149 (H) 03/14/2022    BILITOT 0.3 03/14/2022     Lab Results   Component Value Date     03/14/2022    K 3.4 03/14/2022    K 5.7 03/08/2022     03/14/2022    CO2 31 03/14/2022    BUN 24 03/14/2022    CREATININE 0.7 03/14/2022    CREATININE 0.8 03/13/2022    CREATININE 1.0 03/12/2022    GFRAA >60 03/14/2022    LABGLOM >60 03/14/2022    GLUCOSE 156 03/14/2022    GLUCOSE 181 12/16/2011    PROT 6.1 03/14/2022    LABALBU 2.4 03/14/2022    LABALBU 4.5 11/02/2011 CALCIUM 8.4 03/14/2022    BILITOT 0.3 03/14/2022    ALKPHOS 149 03/14/2022    AST 57 03/14/2022     03/14/2022     Radiology:  Reviewed    Microbiology:   Rapid SARS-CoV-2: Negative  Respiratory panel: Negative  Blood cultures 3/8/2022: Group G Streptococcus in 4 of 4 bottles  Blood cultures 3/9/2022: Negative x2  Leg wound culture 3/8/2022: Proteus, MRSA, CoNS nonhemolytic Streptococcus  Nares screen MRSA: Positive    ASSESSMENT:  · Probable cellulitis right lower extremity  · Group G Streptococcus septicemia probably associated to cellulitis cultures of the leg, however, grew Proteus  · Possible intra-abdominal infection. Possible cholecystitis  · Sepsis with septic shock, improved  · Acute respiratory failure   · Leukocytosis, improving  · Elevation of transaminases. Possible cholecystitis. They seem to be trending downward. Ultrasound shows gallstones and edematous gallbladder wall    PLAN:  · Continue Ceftriaxone, day 7  · Continue Linezolid, day 3  · We will follow with you  · Consider cholecystostomy tube or surgical consultation    Spoke with nursing. Discussed with Dr. Masoud Hui.     Katelin Javier MD  11:17 AM  3/14/2022

## 2022-03-14 NOTE — PROGRESS NOTES
Critical Care Team - Daily Progress Note         Date and time: 3/14/2022 7:31 AM  Patient's name:  [place] AG[place] [address] Record Number: [id number]  Patient's account/billing number: [de-identified]  Patient's YOB: 1955  Age: 74 y.o. Date of Admission: 3/8/2022  4:28 AM  Length of stay during current admission: 6      Primary Care Physician: Nas Briggs DO  ICU Attending Physician: Dr. Darshana Dowling    Code Status: Full Code    Reason for ICU admission: Respiratory failure, shock      SUBJECTIVE:     OVERNIGHT EVENTS:         3/14: Vent day #7. Overnight patient was extremely agitated. Patient pulled art line. Patient had to be sedated because she kept trying to pull out her ETT. She is currently on versed, fentanyl and precedex. Patient tolerated Seroquel well. 3/13: Patient did not experience any acute events overnight. She has been very agitated however and trying to pull at tube. She is still on pressors at this time. 3/12: Pressors decreased. Mentation has not been appropriate yet. Tolerating tube feeds. 3/11: Patient had no acute problems overnight. She is currently being weaned off of vaso and weaned off sedation. She has been persistently hyperglycemic. 3/10: Pressors were changed from levo to rachelle. She would awake intermittently and be agitated. Precedex was started. Patient is tolerating tube feeds as well. 3/9: Patient has converted to sinus rhythm, opening eyes with spells of anxious awakenings. Levophed stopped. Remains on Vent. 3/8: Patient presented to the ICU with hypotension and respiratory failure. Patient was started on pressors and antibiotics at that time. Patient was also started on amiodarone drip for her A. fib. Patient was also started on insulin drip.     CURRENT VENTILATION STATUS:     [x] Ventilator  [] BIPAP  [] Nasal Cannula [] Room Air      IF INTUBATED, ET TUBE MARKING AT LOWER LIP:  cms    SECRETIONS  Amount:  [x] Small [] Moderate  [] Large  [] None  Color:     [] White [] Colored  [] Bloody    SEDATION:  RAAS Score:  [] Propofol gtt  [x] Versed gtt  [x] Fentanyl gtt [] Ativan gtt   [x] Precedex    PARALYZED:  [x] No    [] Yes      VASOPRESSORS:  [x] No    [] Yes    If yes -   [] Levophed       [] Dopamine     [] Vasopressin       [] Dobutamine  [] Phenylephrine         [] Epinephrine    CENTRAL LINES:     [] No   [x] Yes   (Date of Insertion: 3/8)           If yes -     [x] Right IJ     [] Left IJ [] Right Femoral [] Left Femoral                   [] Right Subclavian [] Left Subclavian       BRAUN'S CATHETER:   [] No   [x] Yes  (Date of Insertion: )     URINE OUTPUT:            [x] Good   [] Low              [] Anuric      OBJECTIVE:     VITAL SIGNS:  /62   Pulse 61   Temp 101.3 °F (38.5 °C) (Bladder)   Resp 21   Ht 5' 4\" (1.626 m)   Wt (!) 330 lb 3.2 oz (149.8 kg)   LMP  (LMP Unknown)   SpO2 99%   BMI 56.68 kg/m²   Tmax over 24 hours:  Temp (24hrs), Av °F (38.3 °C), Min:98.6 °F (37 °C), Max:101.7 °F (38.7 °C)      Patient Vitals for the past 6 hrs:   BP Temp Temp src Pulse Resp SpO2 Weight   22 0700 137/62 -- -- 61 -- 99 % --   22 0627 -- -- -- -- -- -- (!) 330 lb 3.2 oz (149.8 kg)   22 0600 (!) 143/74 -- -- 70 -- 98 % --   22 0500 131/70 -- -- 78 -- 98 % --   22 0400 135/69 101.3 °F (38.5 °C) Bladder 63 21 97 % --   22 0343 -- -- -- 67 -- 97 % --   22 0300 (!) 135/95 -- -- 66 21 99 % --   22 0200 137/72 -- -- 65 22 92 % --         Intake/Output Summary (Last 24 hours) at 3/14/2022 0731  Last data filed at 3/14/2022 5368  Gross per 24 hour   Intake 2666.84 ml   Output 2450 ml   Net 216.84 ml     Wt Readings from Last 2 Encounters:   22 (!) 330 lb 3.2 oz (149.8 kg)   21 (!) 304 lb (137.9 kg)     Body mass index is 56.68 kg/m². PHYSICAL EXAMINATION:    Physical Exam  Constitutional:       Appearance: She is morbidly obese. She is ill-appearing. Interventions: She is sedated and intubated. HENT:      Head: Normocephalic and atraumatic. Eyes:      Conjunctiva/sclera: Conjunctivae normal.   Neck:      Trachea: No tracheal deviation. Cardiovascular:      Rate and Rhythm: Normal rate and regular rhythm. Heart sounds: Normal heart sounds. Pulmonary:      Effort: Pulmonary effort is normal. She is intubated. Breath sounds: Decreased breath sounds present. Abdominal:      General: Bowel sounds are normal.      Palpations: Abdomen is soft. Tenderness: There is no abdominal tenderness. Musculoskeletal:      Cervical back: Neck supple. Right lower leg: Edema present. Left lower leg: Edema present. Lymphadenopathy:      Cervical: No cervical adenopathy. Skin:     General: Skin is warm and dry. Findings: No rash. Comments: +bl lower extremity lymphedema chronic skin changes with right leg fissure erythma   Neurological:      General: No focal deficit present.    Psychiatric:         Behavior: Behavior normal.      Any additional physical findings: n/a    MEDICATIONS:    Scheduled Meds:   insulin glargine  45 Units SubCUTAneous BID    hydrocortisone sodium succinate PF  25 mg IntraVENous Q8H    QUEtiapine  25 mg Oral QAM    QUEtiapine  50 mg Oral Nightly    metoprolol tartrate  12.5 mg Oral BID    linezolid  600 mg Per NG tube 2 times per day    mupirocin   Nasal BID    insulin lispro  0-18 Units SubCUTAneous Q4H    docusate  100 mg Per NG tube BID    senna  10 mL Per NG tube BID    sodium chloride flush  5-40 mL IntraVENous BID    enoxaparin  1 mg/kg SubCUTAneous BID    cefTRIAXone (ROCEPHIN) IV  2,000 mg IntraVENous Q24H    chlorhexidine  15 mL Mouth/Throat BID    pantoprazole  40 mg IntraVENous Daily    miconazole   Topical BID     Continuous Infusions:   fentaNYL 5 mcg/ml in 0.9%  ml infusion 200 mcg/hr (03/14/22 1521)    dexmedetomidine (PRECEDEX) IV infusion 1.5 mcg/kg/hr (03/14/22 5556)   [place] Robb dextrose      midazolam 2 mg/hr (03/14/22 0556)    sodium chloride 20 mL/hr at 03/13/22 2000     PRN Meds:   fentanNYL, 25 mcg, Q2H PRN  dextrose bolus (hypoglycemia), 125 mL, PRN   Or  dextrose bolus (hypoglycemia), 250 mL, PRN  glucose, 15 g, PRN  glucagon (rDNA), 1 mg, PRN  dextrose, 100 mL/hr, PRN  midazolam, 2 mg, Q3H PRN  acetaminophen, 650 mg, Q4H PRN  acetaminophen, 650 mg, Q4H PRN          VENT SETTINGS (Comprehensive) (if applicable):  Vent Information  $Ventilation: $Subsequent Day  Skin Assessment: Clean, dry, & intact  Equipment ID: [id number]  Vent Type: [phone]  Vent Mode: AC/VC+  Vt Ordered: 420 mL  Rate Set: 16 bmp  Peak Flow: 0 L/min  Pressure Support: 0 cmH20  FiO2 : 35 %  SpO2: 99 %  SpO2/FiO2 ratio: 282.86  Sensitivity: 2  PEEP/CPAP: 8  I Time/ I Time %: 0 s  Humidification Source: Heated wire  Humidification Temp: 37  Humidification Temp Measured: 37  Circuit Condensation: Drained  Additional Respiratory  Assessments  Pulse: 61  Resp: 21  SpO2: 99 %  Position: Semi-Chapin's  Humidification Source: Heated wire  Humidification Temp: 37  Circuit Condensation: Drained  Oral Care: Mouthwash with chlorhexidine,Lip moisturizer applied,Mouth swabbed,Mouth moisturizer,Mouth suctioned  Subglottic Suction Done?: Yes  Cuff Pressure (cm H2O): 29 cm H2O    ABGs:   Recent Labs     03/12/22  1008   PH 7.448   PCO2 43.4   PO2 94.5   HCO3 29.4*   BE 4.8*   O2SAT 97.1       Laboratory findings:    Complete Blood Count:   Recent Labs     03/12/22  0600 03/13/22  0558 03/14/22  0555   WBC 13.4* 9.2 8.8   HGB 8.4* 8.6* 8.3*   HCT 28.1* 28.9* 28.2*    206 237        Last 3 Blood Glucose:   Recent Labs     03/12/22  0600 03/13/22  0558 03/14/22  0555   GLUCOSE 230* 297* 156*        PT/INR:    Lab Results   Component Value Date    PROTIME 19.5 04/29/2021    INR 1.8 04/29/2021     PTT:    Lab Results   Component Value Date    APTT 36.3 04/29/2021       Comprehensive Metabolic Profile:   Recent Labs 03/12/22  0600 03/12/22  0600 03/13/22  0558 03/13/22  0558 03/14/22  0555     --  139  --  143   K 3.6  --  3.6  --  3.4*     --  102  --  104   CO2 27  --  29  --  31*   BUN 44*  --  34*  --  24*   CREATININE 1.0  --  0.8  --  0.7   GLUCOSE 230*  --  297*  --  156*   CALCIUM 8.4*  --  8.6  --  8.4*   PROT 6.1*   < > 6.4   < > 6.1*   LABALBU 2.3*   < > 2.7*   < > 2.4*   BILITOT 0.3   < > 0.3   < > 0.3   ALKPHOS 157*   < > 197*   < > 149*   *   < > 137*   < > 57*   *   < > 271*   < > 185*    < > = values in this interval not displayed. Magnesium:   Lab Results   Component Value Date    MG 1.9 03/14/2022     Phosphorus:   Lab Results   Component Value Date    PHOS 2.4 03/14/2022     Ionized Calcium:   Lab Results   Component Value Date    CAION 1.37 10/24/2016        Urinalysis: No results found for: UA     Troponin: No results for input(s): TROPONINI in the last 72 hours. Microbiology:   Culture, Blood 2   Date Value Ref Range Status   03/09/2022 24 Hours no growth  Preliminary       Cultures during this admission:     Blood cultures:  [] None drawn      [x] Negative             [x]  Positive (Details: 3/8 beta strep group G, repeats 3/9 negative)  Urine Culture:   [] None drawn      [x] Negative             []  Positive (Details:  )  Sputum Culture:  [x] None drawn      [] Negative             []  Positive (Details:  )   Wound Culture:  [] None drawn      [] Negative             [x]  Positive (Details: S. Aureus, Proteus sp 3/8  )     Other pertinent Labs:        Radiology/Imaging:     Chest Xray (3/14/2022): Suspected bibasilar atelectasis. ASSESSMENT:     Principal Problem:    Sepsis ([address]  Resolved Problems:    * No resolved hospital problems. *      PLAN:     WEAN PER PROTOCOL:  [] No   [x] Yes  [] N/A    DISCONTINUE ANY LABS:   [x] No   [] Yes    ICU PROPHYLAXIS:  Stress ulcer:  [x] PPI Agent  [] E2Nlled [] Sucralfate  [] Other:  VTE:   [] Enoxaparin  [] Unfract.  Heparin Subcut  [] EPC Cuffs    NUTRITION:  [x] NPO [x] Tube Feeding (Specify: ) [] TPN  [] PO (Diet: Diet NPO  ADULT TUBE FEEDING; Nasogastric; Diabetic; Continuous; 10; Yes; 15; Q 8 hours; 50; 30; Q 6 hours)    HOME MEDICATIONS RECONCILED: [] No  [x] Yes    INSULIN DRIP:   [x] No   [] Yes    CONSULTATION NEEDED:  [x] No   [] Yes    FAMILY UPDATED:    [] No   [x] Yes    TRANSFER OUT OF ICU:   [x] No   [] Yes    SYSTEMS ASSESSMENT    Neuro     Intubated, sedated  Fentanyl, Versed, Precedex  Agitated at times    Agitation   Seroquel 50 mg BID  Precedex continued    Respiratory     Intubated Day 7  AC/VC, FiO2 35, vet set 420, rate 16, PEEP 8   Likely can do weaning trial/ extubate 3/14  ABG pending  Wean oxygen as tolerated.  Keep O2 sat 90-92%    Cardiovascular     Paroxysmal atrial fibrillation  Off amnio drip  Continue to monitor  Therapeutic Lovenox    Septic shock  Solu-Cortef 25 mg Q8H  Blood cultures positive Beta strep group G 3/8, repeats negative  Wound culture 3/8 Staph aureus and Proteus sp  Patient remains febrile  Off pressors  See infectious disease below    Gastrointestinal     Possible Cholecystitis  Ultrasound 3/11 gallstones and edematous gallbladder wall  Consider surgery consult    GI PPx  Protonix 40 mg IV daily    Bowel Regimen  Colace 100 mg BID  Senna daily    Renal     CKD stage III, stable  Baseline creatinine 1.1-1.2  Continue to monitor    Electrolyte abnormalities  Replete as needed  Continue to monitor     Infectious Disease     Group G Streptococcus Septicemia  Likely 2/2 to cellulitis of leg (however, cultures grew Proteus)  Blood cultures positive x2 for beta strep group G  Ceftriaxone 2g Q24H (day 7)  Linezolid 600 mg Q12H (Day 3)  Remains febrile  Off pressors  ID following, appreciate input    Leukocytosis, resolved  Likely secondary to infection and steroid use  Remains febrile  Continue to monitor    Febrile  Tylenol suppository every 4 hours as needed for fever    Hematology/Oncology Lovenox 135 mg twice daily    Endocrine     T2DM  Lantus to 45 units twice daily  HDSSI  Hypoglycemia protocol  POC glucose checks    Social/Spiritual/DNR/Other     Full code    Thea Dukes, , PGY-1            3/14/2022, 7:31 AM    Attending Physician Attestation: Dr. Severa Mallet    Thank you very much for allowing me to see this patient in consultation and follow up. I personally saw, examined and provided care for the patient. Radiographs, labs and medication list were reviewed by me independently. I spoke with bedside nursing, respiratory therapists and consultants. Critical care services and times documented are independent of procedures and multidisciplinary rounds with Residents. Additionally comprehensive, multidisciplinary rounds were conducted with the MICU team. The case was discussed in detail and plans for care were established. Review of Residents documentation was conducted and revisions were made as appropriate. I agree with the the above documented information. Physical Examination:  Vitals:   Vitals:    03/14/22 0808 03/14/22 0900 03/14/22 1000 03/14/22 1228   BP:  136/69 (!) 144/75    Pulse: 64 62 67 90   Resp:       Temp:       TempSrc:       SpO2: 95% 98% 97% 96%   Weight:       Height:          General: No Acute Distress, on vent   HEENT: normocephalic, atruamatic  CVS: RRR, S1, S2, No S3 or S4  Respiratory: decreased breath sounds at lung bases, no focal wheezes noted  Extremities: no clubbing/cyanosis/edema  Neuro: sedated, intubated     ASSESSMENT:  · Severe Sepsis with Septic Shock - lower extremity cellulitis, Grp G Streptococcus bacteremia/septicemia   · Probable cellulitis right lower extremity  · Group G Streptococcus septicemia probably associated to cellulitis cultures of the leg, however, grew Proteus  · Possible intra-abdominal infection.   Possible cholecystitis  · Acute Respiratory Failure - on ACVC mechanical ventilation    · Leukocytosis, improving  · Chronic Hypoxic Respiratory Failure - baseline 6L O2 NC  · Moderate/Severe COPD  · Elevation of transaminases. Possible cholecystitis. They seem to be trending downward. Ultrasound shows gallstones and edematous gallbladder wall  · Morbid Obesity - BMI 51  · Atrial Fibrillation with RVR  · Chronic Diastolic CHF Dysfunction   · Chronic Lymphedema of the Lower Extremities      In addition the following applies:    Check: N/A  Medication Alterations: N/A  Procedures: N/A  Imaging: reviewed  New Consultations: N/A  VENT: ACVC (DAY 7) 14/420/35/8  Ppeak: 40  Pplateau: 21  Compliance: 23    Access: Right IJ TLC  Consults: ID, Dietary   Drips: Versed, Fentanyl, Precedex   Nutrition: Tube Feeds  ABX: Linezolid, Ceftriaxone    - wean vent as able, agitation apparent   - if/when extubation occurs patient will be transitioned to BIPAP/AVAPS in order to wean to supplemental oxygen given how poor baseline function is historically     Thank you for allowing me to participate in the care of this patient. Care reviewed with nursing staff, medical and surgical specialty care, primary care and the patient's family as available. Restraints are ordered when the patient can do harm to him/herself by pulling out devices. Critical Care Time: > 35 minutes excluding procedures    Marly Palacio M.D.     Marly Palacio MD  3/14/2022  1:40 PM

## 2022-03-14 NOTE — PATIENT CARE CONFERENCE
Intensive Care Daily Quality Rounding Checklist      ICU Team Members: Dr. Caren Woo, Franklyn Chan, Diana (residents), charge nurse, bedside nurse, clinical pharmacist    ICU Day #: 6    Intubation Date: March 8      Ventilator Day #: 7    Central Line Insertion Date: March 8       Day #: 7    Arterial Line Insertion Date: N/A      Day #: N/A    Temporary Hemodialysis Catheter Insertion Date: N/A      Day # N/A    DVT Prophylaxis: Lovenox bid    GI Prophylaxis: Protonix     Hansen Catheter Insertion Date: March 8      Day #: 6      Continued need (if yes, reason documented and discussed with physician): yes, need for accurate I+O's    Skin Issues/ Wounds and ordered treatment discussed on rounds:     -Stage II w/mepilex coccyx  -Stage II w/protective barrier R nare  -BLE swelling w/wraps.      Goals/ Plans for the Day: Daily labs, wean vent as tolerated, replace electrolytes, soft wrist restraints to prevent pulling tubes

## 2022-03-14 NOTE — PROGRESS NOTES
Subjective:    Remains intubated  in ICU setting  She is awake on Precedex and Versed  no acute events overnight        Objective:    BP (!) 146/73   Pulse 75   Temp 101.3 °F (38.5 °C) (Bladder)   Resp 21   Ht 5' 4\" (1.626 m)   Wt (!) 330 lb 3.2 oz (149.8 kg)   LMP  (LMP Unknown)   SpO2 99%   BMI 56.68 kg/m²     In: 3280.4 [I.V.:2095.4; NG/GT:1185]  Out: 2450   In: 3280.4   Out: 2450 [Urine:2450]    General appearance intubated awake, poor hygiene malodorous  HEENT: AT/NC, MMM  Neck: FROM, supple  Lungs: Clear to auscultation  CV: RRR, no MRGs  Vasc: Radial pulses 2+  Abdomen: Soft, non-tender; no masses or HSM  Extremities: Ichthyosis bilateral lower extremities-bilateral lower extremities in dressing  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  *      Plan:    78-year-old woman with multiple comorbidities admitted to ICU setting for septic shock on triple pressor support, broad-spectrum IV antibiotic therapy, intubated sedated     IV fluid resuscitation for marked lactic acidosis-resolved  Vent wean as able per critical care team  Off pressor support, start midodrine if needed  on Solu-Cortef 3 times daily-wean now the blood pressure is improved-contributing to marked elevation in blood sugars  -blood cultures with alpha hemolytic strep  Broad-spectrum IV antibiotics with infectious disease following-Merrem has been transitioned to Rocephin 3/9/2021-beta strep group G/ linezolid added-white count resolved  Await ruq us -hepatomegaly with diffuse fatty infiltration/gallstones and sludge  Amiodarone drip discontinued  Glargine, insulin sliding scale for blood sugar management, consider insulin drip in critically ill woman, blood sugars spike up to 400 range again  Monitor renal function for possible prerenal failure given hypotension-improving 56/1.2  No evidence of diabetic ketoacidosis-continue to monitor, blood sugars adequately controlled  Initiation of Precedex, fentanyl and Versed for sedation/agitation    Daily labs     DVT Prophylaxis   PT/OT  Discharge Melina Gerard MD  3:46 PM  3/14/2022

## 2022-03-14 NOTE — PROGRESS NOTES
3/14/2022  2:04 PM      Comprehensive Nutrition Assessment    Type and Reason for Visit:  Reassess     Nutrition Recommendations/Plan: Continue current TF, while EN needed for nutrition therapy    Nutrition Assessment:  Pt remains intubated with weaning attempts in progress. Will continue to monitor weaning status and possible diet progression    Malnutrition Assessment:  Malnutrition Status: At risk for malnutrition    Context:  Acute Illness     Findings of the 6 clinical characteristics of malnutrition:  Energy Intake:  Mild decrease in energy intake (Comment) (NPO on admit/intubation before TF)  Weight Loss:  Unable to assess     Body Fat Loss:  No significant body fat loss     Muscle Mass Loss:  No significant muscle mass loss    Fluid Accumulation:  Unable to assess Generalized (multiple factors)   Strength:   Not Performed    Estimated Daily Nutrient Needs:  Energy (kcal):  5546-7628; Weight Used for Energy Requirements:  Admission     Protein (g):  110-120 (2-2.2 g/kg);  Weight Used for Protein Requirements:  Ideal        Fluid (ml/day):  per Critical Care; Method Used for Fluid Requirements:  Other (Comment)      Nutrition Related Findings:  intubated/sedated, rotund abd w/hyperactive BS, +4 edema LE, hypokalemia      Wounds:  Stage II,Pressure Injury,Multiple       Current Nutrition Therapies:    Current Tube Feeding (TF) Orders:  · Feeding Route: Nasogastric  · Formula: Diabetic  · Schedule: Continuous (goal = 50 ml/hr = 1200 ml/d)  · Additives/Modulars:    · Water Flushes: 30 ml Q 6 hr = 120 ml/d  · Current TF & Flush Orders Provides: at goal  · Goal TF & Flush Orders Provides: 1200 ml/d, 1800 juvencio, 99 g pro, 1031 ml total free water      Anthropometric Measures:  · Height: 5' 4\" (162.6 cm)  · Current Body Weight: 330 lb 3.2 oz (149.8 kg) (3/14 bedscale)   · Admission Body Weight: 310 lb 6.5 oz (140.8 kg) (3/12 bedscale - first measured)    · Usual Body Weight: 305 lb 5.4 oz (138.5 kg) (actual wt x 7 mo ago)     · Ideal Body Weight: 120 lbs; % Ideal Body Weight 275.2 %   · BMI: 56.7  · BMI Categories: Obese Class 3 (BMI 40.0 or greater)       Nutrition Diagnosis:   · Inadequate oral intake related to impaired respiratory function as evidenced by NPO or clear liquid status due to medical condition,intubation,wounds      Nutrition Interventions:   Food and/or Nutrient Delivery:  Continue NPO,Continue Current Tube Feeding  Nutrition Education/Counseling:  Education not indicated   Coordination of Nutrition Care:  Continue to monitor while inpatient    Goals:  Pt january EN at goal rate       Nutrition Monitoring and Evaluation:   Behavioral-Environmental Outcomes:  None Identified   Food/Nutrient Intake Outcomes:  Enteral Nutrition Intake/Tolerance  Physical Signs/Symptoms Outcomes:  GI Status,Biochemical Data,Fluid Status or Edema,Nutrition Focused Physical Findings,Skin,Weight     Discharge Planning:     Too soon to determine     Electronically signed by Ruth Mishra RD, CNSC, LD on 3/14/22 at 2:04 PM EDT    Contact: [phone]

## 2022-03-15 ENCOUNTER — APPOINTMENT (OUTPATIENT)
Dept: GENERAL RADIOLOGY | Age: 67
DRG: 870 | End: 2022-03-15
Payer: MEDICARE

## 2022-03-15 LAB
ALBUMIN SERPL-MCNC: 2.6 G/DL (ref 3.5–5.2)
ALP BLD-CCNC: 123 U/L (ref 35–104)
ALT SERPL-CCNC: 118 U/L (ref 0–32)
ANION GAP SERPL CALCULATED.3IONS-SCNC: 7 MMOL/L (ref 7–16)
AST SERPL-CCNC: 29 U/L (ref 0–31)
B.E.: 8.4 MMOL/L (ref -3–3)
BASOPHILS ABSOLUTE: 0.02 E9/L (ref 0–0.2)
BASOPHILS RELATIVE PERCENT: 0.2 % (ref 0–2)
BILIRUB SERPL-MCNC: 0.4 MG/DL (ref 0–1.2)
BUN BLDV-MCNC: 20 MG/DL (ref 6–23)
CALCIUM SERPL-MCNC: 8.2 MG/DL (ref 8.6–10.2)
CHLORIDE BLD-SCNC: 107 MMOL/L (ref 98–107)
CO2: 33 MMOL/L (ref 22–29)
COHB: 1.4 % (ref 0–1.5)
CREAT SERPL-MCNC: 0.8 MG/DL (ref 0.5–1)
CRITICAL: ABNORMAL
DATE ANALYZED: ABNORMAL
DATE OF COLLECTION: ABNORMAL
EOSINOPHILS ABSOLUTE: 0.4 E9/L (ref 0.05–0.5)
EOSINOPHILS RELATIVE PERCENT: 4 % (ref 0–6)
FIO2: 30 %
GFR AFRICAN AMERICAN: >60
GFR NON-AFRICAN AMERICAN: >60 ML/MIN/1.73
GLUCOSE BLD-MCNC: 75 MG/DL (ref 74–99)
HCO3: 33.2 MMOL/L (ref 22–26)
HCT VFR BLD CALC: 27.3 % (ref 34–48)
HEMOGLOBIN: 8 G/DL (ref 11.5–15.5)
HHB: 3.9 % (ref 0–5)
IMMATURE GRANULOCYTES #: 0.29 E9/L
IMMATURE GRANULOCYTES %: 2.9 % (ref 0–5)
LAB: ABNORMAL
LACTIC ACID: 0.7 MMOL/L (ref 0.5–2.2)
LYMPHOCYTES ABSOLUTE: 2.59 E9/L (ref 1.5–4)
LYMPHOCYTES RELATIVE PERCENT: 26.1 % (ref 20–42)
Lab: ABNORMAL
MAGNESIUM: 2.1 MG/DL (ref 1.6–2.6)
MCH RBC QN AUTO: 24.9 PG (ref 26–35)
MCHC RBC AUTO-ENTMCNC: 29.3 % (ref 32–34.5)
MCV RBC AUTO: 85 FL (ref 80–99.9)
METER GLUCOSE: 122 MG/DL (ref 74–99)
METER GLUCOSE: 135 MG/DL (ref 74–99)
METER GLUCOSE: 152 MG/DL (ref 74–99)
METER GLUCOSE: 67 MG/DL (ref 74–99)
METER GLUCOSE: 91 MG/DL (ref 74–99)
METHB: 0.3 % (ref 0–1.5)
MODE: AC
MONOCYTES ABSOLUTE: 0.64 E9/L (ref 0.1–0.95)
MONOCYTES RELATIVE PERCENT: 6.4 % (ref 2–12)
NEUTROPHILS ABSOLUTE: 5.99 E9/L (ref 1.8–7.3)
NEUTROPHILS RELATIVE PERCENT: 60.4 % (ref 43–80)
O2 CONTENT: 10.8 ML/DL
O2 SATURATION: 96 % (ref 92–98.5)
O2HB: 94.4 % (ref 94–97)
OPERATOR ID: 2485
PATIENT TEMP: 37 C
PCO2: 48.3 MMHG (ref 35–45)
PDW BLD-RTO: 16 FL (ref 11.5–15)
PEEP/CPAP: 8 CMH2O
PFO2: 2.71 MMHG/%
PH BLOOD GAS: 7.46 (ref 7.35–7.45)
PHOSPHORUS: 3.5 MG/DL (ref 2.5–4.5)
PLATELET # BLD: 239 E9/L (ref 130–450)
PMV BLD AUTO: 10.5 FL (ref 7–12)
PO2: 81.2 MMHG (ref 75–100)
POTASSIUM SERPL-SCNC: 3.7 MMOL/L (ref 3.5–5)
RBC # BLD: 3.21 E12/L (ref 3.5–5.5)
RR MECHANICAL: 16 B/MIN
SODIUM BLD-SCNC: 147 MMOL/L (ref 132–146)
SOURCE, BLOOD GAS: ABNORMAL
THB: 8 G/DL (ref 11.5–16.5)
TIME ANALYZED: 617
TOTAL PROTEIN: 6.1 G/DL (ref 6.4–8.3)
VT MECHANICAL: 420 ML
WBC # BLD: 9.9 E9/L (ref 4.5–11.5)

## 2022-03-15 PROCEDURE — 2580000003 HC RX 258: Performed by: SPECIALIST

## 2022-03-15 PROCEDURE — 36592 COLLECT BLOOD FROM PICC: CPT

## 2022-03-15 PROCEDURE — 83605 ASSAY OF LACTIC ACID: CPT

## 2022-03-15 PROCEDURE — 6360000002 HC RX W HCPCS: Performed by: SPECIALIST

## 2022-03-15 PROCEDURE — C9113 INJ PANTOPRAZOLE SODIUM, VIA: HCPCS | Performed by: INTERNAL MEDICINE

## 2022-03-15 PROCEDURE — 84100 ASSAY OF PHOSPHORUS: CPT

## 2022-03-15 PROCEDURE — 87040 BLOOD CULTURE FOR BACTERIA: CPT

## 2022-03-15 PROCEDURE — 6370000000 HC RX 637 (ALT 250 FOR IP): Performed by: SPECIALIST

## 2022-03-15 PROCEDURE — 2580000003 HC RX 258: Performed by: INTERNAL MEDICINE

## 2022-03-15 PROCEDURE — 36415 COLL VENOUS BLD VENIPUNCTURE: CPT

## 2022-03-15 PROCEDURE — 80053 COMPREHEN METABOLIC PANEL: CPT

## 2022-03-15 PROCEDURE — 71045 X-RAY EXAM CHEST 1 VIEW: CPT

## 2022-03-15 PROCEDURE — 85025 COMPLETE CBC W/AUTO DIFF WBC: CPT

## 2022-03-15 PROCEDURE — 36600 WITHDRAWAL OF ARTERIAL BLOOD: CPT

## 2022-03-15 PROCEDURE — 6360000002 HC RX W HCPCS: Performed by: INTERNAL MEDICINE

## 2022-03-15 PROCEDURE — 6370000000 HC RX 637 (ALT 250 FOR IP): Performed by: INTERNAL MEDICINE

## 2022-03-15 PROCEDURE — 83735 ASSAY OF MAGNESIUM: CPT

## 2022-03-15 PROCEDURE — 2500000003 HC RX 250 WO HCPCS: Performed by: INTERNAL MEDICINE

## 2022-03-15 PROCEDURE — 94003 VENT MGMT INPAT SUBQ DAY: CPT

## 2022-03-15 PROCEDURE — 82962 GLUCOSE BLOOD TEST: CPT

## 2022-03-15 PROCEDURE — 6370000000 HC RX 637 (ALT 250 FOR IP): Performed by: STUDENT IN AN ORGANIZED HEALTH CARE EDUCATION/TRAINING PROGRAM

## 2022-03-15 PROCEDURE — 2000000000 HC ICU R&B

## 2022-03-15 PROCEDURE — 82805 BLOOD GASES W/O2 SATURATION: CPT

## 2022-03-15 RX ORDER — HEPARIN SODIUM (PORCINE) LOCK FLUSH IV SOLN 100 UNIT/ML 100 UNIT/ML
3 SOLUTION INTRAVENOUS EVERY 12 HOURS SCHEDULED
Status: DISCONTINUED | OUTPATIENT
Start: 2022-03-15 | End: 2022-03-20

## 2022-03-15 RX ORDER — LIDOCAINE HYDROCHLORIDE 10 MG/ML
5 INJECTION, SOLUTION EPIDURAL; INFILTRATION; INTRACAUDAL; PERINEURAL ONCE
Status: COMPLETED | OUTPATIENT
Start: 2022-03-15 | End: 2022-03-18

## 2022-03-15 RX ORDER — SODIUM CHLORIDE 0.9 % (FLUSH) 0.9 %
5-40 SYRINGE (ML) INJECTION EVERY 12 HOURS SCHEDULED
Status: DISCONTINUED | OUTPATIENT
Start: 2022-03-15 | End: 2022-03-22 | Stop reason: HOSPADM

## 2022-03-15 RX ORDER — SODIUM CHLORIDE 9 MG/ML
25 INJECTION, SOLUTION INTRAVENOUS PRN
Status: DISCONTINUED | OUTPATIENT
Start: 2022-03-15 | End: 2022-03-22 | Stop reason: HOSPADM

## 2022-03-15 RX ORDER — INSULIN GLARGINE 100 [IU]/ML
30 INJECTION, SOLUTION SUBCUTANEOUS 2 TIMES DAILY
Status: DISCONTINUED | OUTPATIENT
Start: 2022-03-15 | End: 2022-03-22 | Stop reason: HOSPADM

## 2022-03-15 RX ORDER — HEPARIN SODIUM (PORCINE) LOCK FLUSH IV SOLN 100 UNIT/ML 100 UNIT/ML
3 SOLUTION INTRAVENOUS PRN
Status: DISCONTINUED | OUTPATIENT
Start: 2022-03-15 | End: 2022-03-20

## 2022-03-15 RX ORDER — SODIUM CHLORIDE 0.9 % (FLUSH) 0.9 %
5-40 SYRINGE (ML) INJECTION PRN
Status: DISCONTINUED | OUTPATIENT
Start: 2022-03-15 | End: 2022-03-22 | Stop reason: HOSPADM

## 2022-03-15 RX ADMIN — QUETIAPINE FUMARATE 50 MG: 25 TABLET ORAL at 20:27

## 2022-03-15 RX ADMIN — ENOXAPARIN SODIUM 135 MG: 150 INJECTION SUBCUTANEOUS at 20:46

## 2022-03-15 RX ADMIN — DEXMEDETOMIDINE HYDROCHLORIDE 1.5 MCG/KG/HR: 100 INJECTION, SOLUTION INTRAVENOUS at 02:53

## 2022-03-15 RX ADMIN — METOPROLOL TARTRATE 12.5 MG: 25 TABLET, FILM COATED ORAL at 20:27

## 2022-03-15 RX ADMIN — Medication 200 MCG/HR: at 04:06

## 2022-03-15 RX ADMIN — ACETAMINOPHEN 650 MG: 325 TABLET ORAL at 05:24

## 2022-03-15 RX ADMIN — METOPROLOL TARTRATE 12.5 MG: 25 TABLET, FILM COATED ORAL at 08:12

## 2022-03-15 RX ADMIN — INSULIN LISPRO 3 UNITS: 100 INJECTION, SOLUTION INTRAVENOUS; SUBCUTANEOUS at 12:25

## 2022-03-15 RX ADMIN — DEXMEDETOMIDINE HYDROCHLORIDE 1.5 MCG/KG/HR: 100 INJECTION, SOLUTION INTRAVENOUS at 17:30

## 2022-03-15 RX ADMIN — DEXMEDETOMIDINE HYDROCHLORIDE 1.5 MCG/KG/HR: 100 INJECTION, SOLUTION INTRAVENOUS at 09:48

## 2022-03-15 RX ADMIN — SENNOSIDES 17.6 MG: 8.8 SYRUP ORAL at 08:12

## 2022-03-15 RX ADMIN — DEXMEDETOMIDINE HYDROCHLORIDE 1.5 MCG/KG/HR: 100 INJECTION, SOLUTION INTRAVENOUS at 00:58

## 2022-03-15 RX ADMIN — Medication 200 MCG/HR: at 12:25

## 2022-03-15 RX ADMIN — Medication 3 MG/HR: at 14:26

## 2022-03-15 RX ADMIN — ACETAMINOPHEN 650 MG: 325 TABLET ORAL at 08:12

## 2022-03-15 RX ADMIN — MUPIROCIN: 20 OINTMENT TOPICAL at 20:28

## 2022-03-15 RX ADMIN — HYDROCORTISONE SODIUM SUCCINATE 25 MG: 100 INJECTION, POWDER, FOR SOLUTION INTRAMUSCULAR; INTRAVENOUS at 21:34

## 2022-03-15 RX ADMIN — HYDROCORTISONE SODIUM SUCCINATE 25 MG: 100 INJECTION, POWDER, FOR SOLUTION INTRAMUSCULAR; INTRAVENOUS at 05:24

## 2022-03-15 RX ADMIN — DEXMEDETOMIDINE HYDROCHLORIDE 1.5 MCG/KG/HR: 100 INJECTION, SOLUTION INTRAVENOUS at 05:23

## 2022-03-15 RX ADMIN — DEXMEDETOMIDINE HYDROCHLORIDE 1.5 MCG/KG/HR: 100 INJECTION, SOLUTION INTRAVENOUS at 12:18

## 2022-03-15 RX ADMIN — PANTOPRAZOLE SODIUM 40 MG: 40 INJECTION, POWDER, FOR SOLUTION INTRAVENOUS at 08:13

## 2022-03-15 RX ADMIN — DEXMEDETOMIDINE HYDROCHLORIDE 1.5 MCG/KG/HR: 100 INJECTION, SOLUTION INTRAVENOUS at 23:49

## 2022-03-15 RX ADMIN — LINEZOLID 600 MG: 100 SUSPENSION ORAL at 08:43

## 2022-03-15 RX ADMIN — MUPIROCIN: 20 OINTMENT TOPICAL at 08:13

## 2022-03-15 RX ADMIN — SENNOSIDES 17.6 MG: 8.8 SYRUP ORAL at 20:28

## 2022-03-15 RX ADMIN — Medication 200 MCG/HR: at 21:34

## 2022-03-15 RX ADMIN — MICONAZOLE NITRATE: 20 POWDER TOPICAL at 08:13

## 2022-03-15 RX ADMIN — INSULIN GLARGINE 30 UNITS: 100 INJECTION, SOLUTION SUBCUTANEOUS at 08:14

## 2022-03-15 RX ADMIN — LINEZOLID 600 MG: 100 SUSPENSION ORAL at 20:28

## 2022-03-15 RX ADMIN — DEXMEDETOMIDINE HYDROCHLORIDE 1 MCG/KG/HR: 100 INJECTION, SOLUTION INTRAVENOUS at 14:28

## 2022-03-15 RX ADMIN — DEXMEDETOMIDINE HYDROCHLORIDE 1.5 MCG/KG/HR: 100 INJECTION, SOLUTION INTRAVENOUS at 21:55

## 2022-03-15 RX ADMIN — MICONAZOLE NITRATE: 20 POWDER TOPICAL at 20:29

## 2022-03-15 RX ADMIN — DOCUSATE SODIUM LIQUID 100 MG: 100 LIQUID ORAL at 20:27

## 2022-03-15 RX ADMIN — ACETAMINOPHEN 650 MG: 325 TABLET ORAL at 20:29

## 2022-03-15 RX ADMIN — HYDROCORTISONE SODIUM SUCCINATE 25 MG: 100 INJECTION, POWDER, FOR SOLUTION INTRAMUSCULAR; INTRAVENOUS at 13:34

## 2022-03-15 RX ADMIN — ENOXAPARIN SODIUM 135 MG: 150 INJECTION SUBCUTANEOUS at 08:33

## 2022-03-15 RX ADMIN — DEXMEDETOMIDINE HYDROCHLORIDE 1.5 MCG/KG/HR: 100 INJECTION, SOLUTION INTRAVENOUS at 19:38

## 2022-03-15 RX ADMIN — QUETIAPINE FUMARATE 50 MG: 25 TABLET ORAL at 08:13

## 2022-03-15 RX ADMIN — CHLORHEXIDINE GLUCONATE 0.12% ORAL RINSE 15 ML: 1.2 LIQUID ORAL at 20:46

## 2022-03-15 RX ADMIN — DOCUSATE SODIUM LIQUID 100 MG: 100 LIQUID ORAL at 08:13

## 2022-03-15 RX ADMIN — SODIUM CHLORIDE, PRESERVATIVE FREE 10 ML: 5 INJECTION INTRAVENOUS at 20:28

## 2022-03-15 RX ADMIN — WATER 2000 MG: 1 INJECTION INTRAMUSCULAR; INTRAVENOUS; SUBCUTANEOUS at 12:34

## 2022-03-15 RX ADMIN — CHLORHEXIDINE GLUCONATE 0.12% ORAL RINSE 15 ML: 1.2 LIQUID ORAL at 08:13

## 2022-03-15 RX ADMIN — INSULIN GLARGINE 30 UNITS: 100 INJECTION, SOLUTION SUBCUTANEOUS at 20:53

## 2022-03-15 RX ADMIN — DEXMEDETOMIDINE HYDROCHLORIDE 1.5 MCG/KG/HR: 100 INJECTION, SOLUTION INTRAVENOUS at 07:45

## 2022-03-15 ASSESSMENT — PULMONARY FUNCTION TESTS
PIF_VALUE: 46
PIF_VALUE: 43
PIF_VALUE: 41
PIF_VALUE: 42
PIF_VALUE: 42
PIF_VALUE: 38
PIF_VALUE: 49
PIF_VALUE: 48
PIF_VALUE: 18
PIF_VALUE: 39
PIF_VALUE: 50
PIF_VALUE: 45
PIF_VALUE: 42
PIF_VALUE: 45
PIF_VALUE: 30
PIF_VALUE: 53
PIF_VALUE: 52
PIF_VALUE: 55
PIF_VALUE: 48
PIF_VALUE: 54
PIF_VALUE: 44
PIF_VALUE: 49
PIF_VALUE: 42
PIF_VALUE: 50
PIF_VALUE: 47
PIF_VALUE: 48
PIF_VALUE: 45
PIF_VALUE: 51

## 2022-03-15 ASSESSMENT — PAIN SCALES - GENERAL
PAINLEVEL_OUTOF10: 0

## 2022-03-15 NOTE — CARE COORDINATION
Continue ICU, continue vent support, still not tolerating weaning process. Remains on fentanyl, precedex and versed drips.  Will continue to follow-mjo

## 2022-03-15 NOTE — PROGRESS NOTES
[address] [place] [address] [facility]  DARLYNIDA  Progress Note    SUBJECTIVE:  Chief Complaint   Patient presents with    Cough     started a couple days ago    Hyperglycemia      per EMS    Chills    Nausea     Zofran given per EMS     The patient remains intubated. She gets very agitated when sedation comes off. Difficulty weaning. She is still having low-grade fevers. Review of systems:  A 10 point review of systems was done. As stated above, otherwise negative. Medications:   insulin glargine  30 Units SubCUTAneous BID    QUEtiapine  50 mg Oral BID    hydrocortisone sodium succinate PF  25 mg IntraVENous Q8H    metoprolol tartrate  12.5 mg Oral BID    linezolid  600 mg Per NG tube 2 times per day    mupirocin   Nasal BID    insulin lispro  0-18 Units SubCUTAneous Q4H    docusate  100 mg Per NG tube BID    senna  10 mL Per NG tube BID    sodium chloride flush  5-40 mL IntraVENous BID    enoxaparin  1 mg/kg SubCUTAneous BID    cefTRIAXone (ROCEPHIN) IV  2,000 mg IntraVENous Q24H    chlorhexidine  15 mL Mouth/Throat BID    pantoprazole  40 mg IntraVENous Daily    miconazole   Topical BID      fentaNYL 5 mcg/ml in 0.9%  ml infusion 200 mcg/hr (03/15/22 0550)    dexmedetomidine (PRECEDEX) IV infusion 1.5 mcg/kg/hr (03/15/22 0948)    dextrose      midazolam 3 mg/hr (03/15/22 0550)    sodium chloride 10 mL/hr at 03/15/22 0550     white petrolatum, fentanNYL, dextrose bolus (hypoglycemia) **OR** dextrose bolus (hypoglycemia), glucose, glucagon (rDNA), dextrose, midazolam, acetaminophen, acetaminophen    OBJECTIVE:  BP (!) 149/77   Pulse 79   Temp 101.7 °F (38.7 °C) (Bladder)   Resp 24   Ht 5' 4\" (1.626 m)   Wt (!) 330 lb 3.2 oz (149.8 kg)   LMP  (LMP Unknown)   SpO2 97%   BMI 56.68 kg/m²   Temp  Av.6 °F (38.7 °C)  Min: 101.1 °F (38.4 °C)  Max: 101.7 °F (38.7 °C)  Constitutional: The patient is lying even in the ICU. She is intubated and sedated. Restless. She is restrained opens eyes. FiO2 30%. PEEP 8. Skin: Warm and dry. No rashes were noted. HEENT: Eyes show round, and reactive pupils. No jaundice. Moist mucous membranes, no ulcerations, no thrush. ETT. NGT  Neck: Supple to movements. No lymphadenopathy. Chest: No respiratory distress. No crackles. Cardiovascular: Heart sounds rhythmic and regular. Abdomen: Positive bowel heart sounds rhythmic and regular. Sounds to auscultation. Benign to palpation. Large and pendulous. Intertrigo improved. Extremities: Both legs are wrapped in Ace. Lines: Right radial arterial line 3/8/2022. Left IJ TLC 3/8/2022.     Laboratory and Tests Review:  Lab Results   Component Value Date    WBC 9.9 03/15/2022    WBC 8.8 03/14/2022    WBC 9.2 03/13/2022    HGB 8.0 (L) 03/15/2022    HCT 27.3 (L) 03/15/2022    MCV 85.0 03/15/2022     03/15/2022     Lab Results   Component Value Date    NEUTROABS 5.99 03/15/2022    NEUTROABS 6.51 03/14/2022    NEUTROABS 6.81 03/13/2022     Lab Results   Component Value Date    CRP 42.1 (H) 03/11/2022    CRP 13.7 (H) 03/08/2022    CRP 5.3 (H) 07/05/2021     No results found for: CRPHS  Lab Results   Component Value Date    SEDRATE 98 (H) 03/11/2022    SEDRATE 30 (H) 03/08/2022    SEDRATE 41 (H) 07/05/2021     Lab Results   Component Value Date     (H) 03/15/2022    AST 29 03/15/2022    ALKPHOS 123 (H) 03/15/2022    BILITOT 0.4 03/15/2022     Lab Results   Component Value Date     03/15/2022    K 3.7 03/15/2022    K 5.7 03/08/2022     03/15/2022    CO2 33 03/15/2022    BUN 20 03/15/2022    CREATININE 0.8 03/15/2022    CREATININE 0.7 03/14/2022    CREATININE 0.8 03/13/2022    GFRAA >60 03/15/2022    LABGLOM >60 03/15/2022    GLUCOSE 75 03/15/2022    GLUCOSE 181 12/16/2011    PROT 6.1 03/15/2022    LABALBU 2.6 03/15/2022    LABALBU 4.5 11/02/2011    CALCIUM 8.2 03/15/2022    BILITOT 0.4 03/15/2022    ALKPHOS 123 03/15/2022    AST 29 03/15/2022     03/15/2022 Radiology:  Reviewed    Microbiology:   Rapid SARS-CoV-2: Negative  Respiratory panel: Negative  Blood cultures 3/8/2022: Group G Streptococcus in 4 of 4 bottles  Blood cultures 3/9/2022: Negative x2  Leg wound culture 3/8/2022: Proteus, MRSA, CoNS nonhemolytic Streptococcus  Nares screen MRSA: Positive    ASSESSMENT:  · Cellulitis right lower extremity  · Group G Streptococcus septicemia probably associated to cellulitis. Cultures of the leg, however, grew Proteus  · Possible intra-abdominal infection. Possible cholecystitis  · Sepsis with septic shock, improved  · Acute respiratory failure   · Leukocytosis, improving  · Elevation of transaminases. Possible cholecystitis. They seem to be trending downward. Ultrasound shows gallstones and edematous gallbladder wall    PLAN:  · Continue Ceftriaxone, day 7  · Continue Linezolid, day 3  · We will follow with you  · Consider cholecystostomy tube or surgical consultation    Spoke with nursing. Discussed with Dr. Deisy Dixon.     Mattie Villalobos MD  10:04 AM  3/15/2022

## 2022-03-15 NOTE — PROGRESS NOTES
Pulmonary Subsequent Hospital F/U note    Patient is being followed for: acute on chronic hypoxic and hypercapnic respiratory failure     Interval HPI:  Remains critically ill on mechanical ventilation  Awake and agitated when I saw her  Remains on volume control: 30%, 16, 420cc, 5  +fevers 101.5    ROS:  Unable to obtain       Exam:  BP (!) 156/71   Pulse 65   Temp 101.5 °F (38.6 °C) (Bladder)   Resp 19   Ht 5' 4\" (1.626 m)   Wt (!) 330 lb 3.2 oz (149.8 kg)   LMP  (LMP Unknown)   SpO2 96%   BMI 56.68 kg/m²    General: Patient is intubated and agitated   HEENT: PERRL, EOMI, ETT in place   Neck: supple no adenopathy  CV: RRR without murmur  Lungs: decreased BS diffusely  Abd: soft, ND, NT, bowel sounds normal  Ext: warm, extensive chronic lymphedema of the legs     Data:    Oximetry:  SpO2 Readings from Last 1 Encounters:   03/15/22 96%       Imaging personally reviewed by myself:  CXR     Endotracheal tube terminates near the aissatou and should be retracted   approximately 2-3 cm for more optimal positioning.       No other change. Pertinent labs reviewed and noted:  Lab Results   Component Value Date    WBC 9.9 03/15/2022    HGB 8.0 03/15/2022    HCT 27.3 03/15/2022    MCV 85.0 03/15/2022    MCH 24.9 03/15/2022    MCHC 29.3 03/15/2022    RDW 16.0 03/15/2022     03/15/2022    MPV 10.5 03/15/2022     Lab Results   Component Value Date     03/15/2022    K 3.7 03/15/2022    K 5.7 03/08/2022     03/15/2022    CO2 33 03/15/2022    BUN 20 03/15/2022    CREATININE 0.8 03/15/2022    LABALBU 2.6 03/15/2022    LABALBU 4.5 11/02/2011    CALCIUM 8.2 03/15/2022    GFRAA >60 03/15/2022    LABGLOM >60 03/15/2022     Lab Results   Component Value Date    PROTIME 19.5 04/29/2021    INR 1.8 04/29/2021       Assessment:  1. Acute on chronic hypoxic and hypercapnic respiratory failure  2. Atelectasis  3. Group G strep bacteremia  4. Fevers  5. Cellulitis R lower extremity  6. Morbid obesity BMI 56  7. Lymphedema  8. Septic shock, resolved  9. Atrial fibrillation   10. HFpEF  11. Pulmonary hypertension  12. COPD    Plan:  1. Pressure support trials as tolerated  2. Wean sedation - may need to initiate oral sedatives. Has been started on seroquel  3. Antibiotics per ID  4. Would start diuresis   5. Repeat blood cultures with fevers  6.  ICU care     Electronically signed by Brook Felix MD on 3/15/2022 at 6:33 PM

## 2022-03-15 NOTE — PROGRESS NOTES
Consult for stage 2 on coccyx. Patient intubated, restraints. On lo air loss rodger bed. Small pink open area coccyx/ inner buttocks  Aces removed from legs. Lymphedema, irregular surface. Thickened skin. No drainage noted  Yen Farley.  Shelby Sanabria, CNS, Wound Care

## 2022-03-15 NOTE — PATIENT CARE CONFERENCE
..  Intensive Care Daily Quality Rounding Checklist      ICU Team Members: bedside nurse, charge nurse, clinical pharmacist, RT, Florina Bowen, residents    ICU Day #: NUMBER: 7    Intubation Date: March 8    Ventilator Day #: NUMBER: 8    Central Line Insertion Date: March 8        Day #: NUMBER: 8     Arterial Line Insertion Date:  n/a      Day #: n/a    Temporary Hemodialysis Catheter Insertion Date:  n/a      Day # n/a    DVT Prophylaxis: Lovenox    GI Prophylaxis: Protonix    Hansen Catheter Insertion Date: March 8       Day #: 8      Continued need (if yes, reason documented and discussed with physician): yes, accurate I & O    Skin Issues/ Wounds and ordered treatment discussed on rounds: yes -- Stage 2 coccyx                            Stage 2 right nare                           BLE swelling- ace wraps    Goals/ Plans for the Day: daily labs & abgs.  Cont to attempt to wean sedation & vent, renew restraints for patient safety,place PICC line

## 2022-03-15 NOTE — PLAN OF CARE
Problem: Non-Violent Restraints  Goal: Removal from restraints as soon as assessed to be safe  Outcome: Not Met This Shift  Goal: No harm/injury to patient while restraints in use  Outcome: Met This Shift  Goal: Patient's dignity will be maintained  Outcome: Met This Shift     Problem: Discharge Planning:  Goal: Participates in care planning  Description: Participates in care planning  Outcome: Not Met This Shift  Goal: Discharged to appropriate level of care  Description: Discharged to appropriate level of care  Outcome: Not Met This Shift     Problem: Airway Clearance - Ineffective:  Goal: Ability to maintain a clear airway will improve  Description: Ability to maintain a clear airway will improve  Outcome: Ongoing     Problem: Anxiety/Stress:  Goal: Level of anxiety will decrease  Description: Level of anxiety will decrease  Outcome: Not Met This Shift     Problem: Aspiration:  Goal: Absence of aspiration  Description: Absence of aspiration  Outcome: Met This Shift     Problem:  Bowel Function - Altered:  Goal: Bowel elimination is within specified parameters  Description: Bowel elimination is within specified parameters  Outcome: Ongoing     Problem: Cardiac Output - Decreased:  Goal: Hemodynamic stability will improve  Description: Hemodynamic stability will improve  Outcome: Ongoing     Problem: Fluid Volume - Imbalance:  Goal: Absence of imbalanced fluid volume signs and symptoms  Description: Absence of imbalanced fluid volume signs and symptoms  Outcome: Ongoing     Problem: Gas Exchange - Impaired:  Goal: Levels of oxygenation will improve  Description: Levels of oxygenation will improve  Outcome: Met This Shift     Problem: Mental Status - Impaired:  Goal: Mental status will be restored to baseline  Description: Mental status will be restored to baseline  Outcome: Not Met This Shift     Problem: Nutrition Deficit:  Goal: Ability to achieve adequate nutritional intake will improve  Description: Ability to achieve adequate nutritional intake will improve  Outcome: Ongoing     Problem: Pain:  Goal: Pain level will decrease  Description: Pain level will decrease  Outcome: Met This Shift  Goal: Recognizes and communicates pain  Description: Recognizes and communicates pain  Outcome: Not Met This Shift  Goal: Control of acute pain  Description: Control of acute pain  Outcome: Met This Shift  Goal: Control of chronic pain  Description: Control of chronic pain  Outcome: Met This Shift     Problem: Serum Glucose Level - Abnormal:  Goal: Ability to maintain appropriate glucose levels will improve to within specified parameters  Description: Ability to maintain appropriate glucose levels will improve to within specified parameters  Outcome: Met This Shift     Problem: Skin Integrity - Impaired:  Goal: Will show no infection signs and symptoms  Description: Will show no infection signs and symptoms  Outcome: Not Met This Shift  Goal: Absence of new skin breakdown  Description: Absence of new skin breakdown  Outcome: Met This Shift     Problem: Sleep Pattern Disturbance:  Goal: Appears well-rested  Description: Appears well-rested  Outcome: Ongoing     Problem: Tissue Perfusion, Altered:  Goal: Circulatory function within specified parameters  Description: Circulatory function within specified parameters  Outcome: Ongoing     Problem: Tissue Perfusion - Cardiopulmonary, Altered:  Goal: Absence of angina  Description: Absence of angina  Outcome: Met This Shift  Goal: Hemodynamic stability will improve  Description: Hemodynamic stability will improve  Outcome: Ongoing     Problem: Inadequate oral food/beverage intake (NI-2.1)  Goal: Food and/or Nutrient Delivery  Description: Individualized approach for food/nutrient provision.   3/14/2022 1403 by Portia Anthony RD, CNSC, LD  Outcome: Met This Shift     Problem: Skin Integrity:  Goal: Will show no infection signs and symptoms  Description: Will show no infection signs and symptoms  Outcome: Ongoing  Goal: Absence of new skin breakdown  Description: Absence of new skin breakdown  Outcome: Met This Shift     Problem: Falls - Risk of:  Goal: Will remain free from falls  Description: Will remain free from falls  Outcome: Met This Shift  Goal: Absence of physical injury  Description: Absence of physical injury  Outcome: Met This Shift

## 2022-03-15 NOTE — PROGRESS NOTES
SBT initiated with Dr. Farzaneh Wright at bedside on a PS of 12. Patient did well however became agitated and tidal volumes declined to less than 200's with a respiratory rate in the mid 30's. SBT  Ceased Will RN as well as Dr. Eleonora Ball notified.

## 2022-03-15 NOTE — PROGRESS NOTES
ETT retracted 3 cm to 22 at the right lip line. Patient with bilateral breath sounds noted. ETT resecured.

## 2022-03-15 NOTE — PROGRESS NOTES
Critical Care Team - Daily Progress Note         Date and time: 3/15/2022 7:02 AM  Patient's name:  S[place] AGEE [place] [address] Record Number: [id number]  Patient's account/billing number: [de-identified]  Patient's YOB: 1955   Age: 74 y.o. Date of Admission: 3/8/2022  4:28 AM  Length of stay during current admission: 7      Primary Care Physician: Azra Retana DO  ICU Attending Physician: Dr. Dyana Lainez Status: Full Code    Reason for ICU admission: Respiratory failure, shock      SUBJECTIVE:     OVERNIGHT EVENTS:         3/15: Vent day #8. Patient was still agitated overnight. Patient was unable to be weaned off vent yesterday. We will likely try again today. She remains on Versed, fentanyl, Precedex. This morning she did experience a drop in her blood sugars, likely due to the decrease in steroids. Tube feeds are still running at this time. Lantus will be adjusted today. 3/14: Vent day #7. Overnight patient was extremely agitated. Patient pulled art line. Patient had to be sedated because she kept trying to pull out her ETT. She is currently on versed, fentanyl and precedex. Patient tolerated Seroquel well. 3/13: Patient did not experience any acute events overnight. She has been very agitated however and trying to pull at tube. She is still on pressors at this time. 3/12: Pressors decreased. Mentation has not been appropriate yet. Tolerating tube feeds. 3/11: Patient had no acute problems overnight. She is currently being weaned off of vaso and weaned off sedation. She has been persistently hyperglycemic. 3/10: Pressors were changed from levo to rachelle. She would awake intermittently and be agitated. Precedex was started. Patient is tolerating tube feeds as well. 3/9: Patient has converted to sinus rhythm, opening eyes with spells of anxious awakenings. Levophed stopped. Remains on Vent.      3/8: Patient presented to the ICU with hypotension and respiratory failure. Patient was started on pressors and antibiotics at that time. Patient was also started on amiodarone drip for her A. fib. Patient was also started on insulin drip.     CURRENT VENTILATION STATUS:     [x] Ventilator  [] BIPAP  [] Nasal Cannula [] Room Air      IF INTUBATED, ET TUBE MARKING AT LOWER LIP:  cms    SECRETIONS  Amount:  [x] Small [] Moderate  [] Large  [] None  Color:     [] White [] Colored  [] Bloody    SEDATION:  RAAS Score:  [] Propofol gtt  [x] Versed gtt  [x] Fentanyl gtt [] Ativan gtt   [x] Precedex    PARALYZED:  [x] No    [] Yes      VASOPRESSORS:  [x] No    [] Yes    If yes -   [] Levophed       [] Dopamine     [] Vasopressin       [] Dobutamine  [] Phenylephrine         [] Epinephrine    CENTRAL LINES:     [] No   [x] Yes   (Date of Insertion: 3/8)           If yes -     [x] Right IJ     [] Left IJ [] Right Femoral [] Left Femoral                   [] Right Subclavian [] Left Subclavian       BRAUN'S CATHETER:   [] No   [x] Yes  (Date of Insertion: )     URINE OUTPUT:            [x] Good   [] Low              [] Anuric      OBJECTIVE:     VITAL SIGNS:  BP (!) 151/68   Pulse 58   Temp 101.7 °F (38.7 °C) (Bladder)   Resp 16   Ht 5' 4\" (1.626 m)   Wt (!) 330 lb 3.2 oz (149.8 kg)   LMP  (LMP Unknown)   SpO2 97%   BMI 56.68 kg/m²   Tmax over 24 hours:  Temp (24hrs), Av.6 °F (38.7 °C), Min:101.1 °F (38.4 °C), Max:101.7 °F (38.7 °C)      Patient Vitals for the past 6 hrs:   BP Temp Temp src Pulse Resp SpO2   03/15/22 0600 (!) 151/68 -- -- 58 16 97 %   03/15/22 0550 -- -- -- 65 16 96 %   03/15/22 0500 134/65 -- -- 77 27 96 %   03/15/22 0400 (!) 123/59 101.7 °F (38.7 °C) Bladder 65 (!) 42 96 %   03/15/22 0300 (!) 147/67 -- -- 66 -- 96 %   03/15/22 0256 -- -- -- 64 -- 95 %   03/15/22 0200 (!) 167/73 -- -- 79 -- 96 %         Intake/Output Summary (Last 24 hours) at 3/15/2022 0702  Last data filed at 3/15/2022 0550  Gross per 24 hour   Intake 4360.82 ml   Output 665 ml   Net 3695.82 ml     Wt Readings from Last 2 Encounters:   03/14/22 (!) 330 lb 3.2 oz (149.8 kg)   08/31/21 (!) 304 lb (137.9 kg)     Body mass index is 56.68 kg/m². PHYSICAL EXAMINATION:    Physical Exam  Constitutional:       Appearance: She is morbidly obese. She is ill-appearing. Interventions: She is sedated and intubated. HENT:      Head: Normocephalic and atraumatic. Eyes:      Conjunctiva/sclera: Conjunctivae normal.   Neck:      Trachea: No tracheal deviation. Cardiovascular:      Rate and Rhythm: Normal rate and regular rhythm. Heart sounds: Normal heart sounds. Pulmonary:      Effort: Pulmonary effort is normal. She is intubated. Breath sounds: Decreased breath sounds present. Abdominal:      General: Bowel sounds are normal.      Palpations: Abdomen is soft. Tenderness: There is no abdominal tenderness. Musculoskeletal:      Cervical back: Neck supple. Right lower leg: Edema present. Left lower leg: Edema present. Lymphadenopathy:      Cervical: No cervical adenopathy. Skin:     General: Skin is warm and dry. Findings: No rash. Comments: +bl lower extremity lymphedema chronic skin changes with right leg fissure erythma   Neurological:      General: No focal deficit present.    Psychiatric:         Behavior: Behavior normal.      Any additional physical findings: n/a    MEDICATIONS:    Scheduled Meds:   QUEtiapine  50 mg Oral BID    insulin glargine  45 Units SubCUTAneous BID    hydrocortisone sodium succinate PF  25 mg IntraVENous Q8H    metoprolol tartrate  12.5 mg Oral BID    linezolid  600 mg Per NG tube 2 times per day    mupirocin   Nasal BID    insulin lispro  0-18 Units SubCUTAneous Q4H    docusate  100 mg Per NG tube BID    senna  10 mL Per NG tube BID    sodium chloride flush  5-40 mL IntraVENous BID    enoxaparin  1 mg/kg SubCUTAneous BID    cefTRIAXone (ROCEPHIN) IV  2,000 mg IntraVENous Q24H    chlorhexidine  15 mL Mouth/Throat BID  pantoprazole  40 mg IntraVENous Daily    miconazole   Topical BID     Continuous Infusions:   fentaNYL 5 mcg/ml in 0.9%  ml infusion 200 mcg/hr (03/15/22 0550)    dexmedetomidine (PRECEDEX) IV infusion 1.5 mcg/kg/hr (03/15/22 0550)    dextrose      midazolam 3 mg/hr (03/15/22 0550)    sodium chloride 10 mL/hr at 03/15/22 0550     PRN Meds:   white petrolatum, , BID PRN  fentanNYL, 25 mcg, Q2H PRN  dextrose bolus (hypoglycemia), 125 mL, PRN   Or  dextrose bolus (hypoglycemia), 250 mL, PRN  glucose, 15 g, PRN  glucagon (rDNA), 1 mg, PRN  dextrose, 100 mL/hr, PRN  midazolam, 2 mg, Q3H PRN  acetaminophen, 650 mg, Q4H PRN  acetaminophen, 650 mg, Q4H PRN          VENT SETTINGS (Comprehensive) (if applicable):  Vent Information  $Ventilation: $Subsequent Day  Skin Assessment: Clean, dry, & intact  Equipment ID: [id number]  Vent Type: [phone]  Vent Mode: AC/VC+  Vt Ordered: 420 mL  Rate Set: 16 bmp  Peak Flow: 0 L/min  Pressure Support: 0 cmH20  FiO2 : 30 %  SpO2: 97 %  SpO2/FiO2 ratio: 323.33  Sensitivity: 2  PEEP/CPAP: 8  I Time/ I Time %: 0 s  Humidification Source: Heated wire  Humidification Temp: 37  Humidification Temp Measured: 36.8  Circuit Condensation: Drained  Additional Respiratory  Assessments  Pulse: 58  Resp: 16  SpO2: 97 %  Position: Semi-Chapin's  Humidification Source: Heated wire  Humidification Temp: 37  Circuit Condensation: Drained  Oral Care: Mouth suctioned,Mouth swabbed,Mouth moisturizer,Lip moisturizer applied  Subglottic Suction Done?: Yes  Cuff Pressure (cm H2O): 29 cm H2O    ABGs:   Recent Labs     03/15/22  0617   PH 7.455*   PCO2 48.3*   PO2 81.2   HCO3 33.2*   BE 8.4*   O2SAT 96.0       Laboratory findings:    Complete Blood Count:   Recent Labs     03/13/22  0558 03/14/22  0555 03/15/22  0530   WBC 9.2 8.8 9.9   HGB 8.6* 8.3* 8.0*   HCT 28.9* 28.2* 27.3*    237 239        Last 3 Blood Glucose:   Recent Labs     03/13/22  0558 03/14/22  0555 03/15/22  0530   GLUCOSE 297* 156* 75        PT/INR:    Lab Results   Component Value Date    PROTIME 19.5 04/29/2021    INR 1.8 04/29/2021     PTT:    Lab Results   Component Value Date    APTT 36.3 04/29/2021       Comprehensive Metabolic Profile:   Recent Labs     03/13/22  0558 03/13/22  0558 03/14/22  0555 03/14/22  0555 03/15/22  0530     --  143  --  147*   K 3.6  --  3.4*  --  3.7     --  104  --  107   CO2 29  --  31*  --  33*   BUN 34*  --  24*  --  20   CREATININE 0.8  --  0.7  --  0.8   GLUCOSE 297*  --  156*  --  75   CALCIUM 8.6  --  8.4*  --  8.2*   PROT 6.4   < > 6.1*   < > 6.1*   LABALBU 2.7*   < > 2.4*   < > 2.6*   BILITOT 0.3   < > 0.3   < > 0.4   ALKPHOS 197*   < > 149*   < > 123*   *   < > 57*   < > 29   *   < > 185*   < > 118*    < > = values in this interval not displayed. Magnesium:   Lab Results   Component Value Date    MG 2.1 03/15/2022     Phosphorus:   Lab Results   Component Value Date    PHOS 3.5 03/15/2022     Ionized Calcium:   Lab Results   Component Value Date    CAION 1.37 10/24/2016        Urinalysis: No results found for: UA     Troponin: No results for input(s): TROPONINI in the last 72 hours. Microbiology:   Culture, Blood 2   Date Value Ref Range Status   03/09/2022 5 Days no growth  Final       Cultures during this admission:     Blood cultures:  [] None drawn      [x] Negative             [x]  Positive (Details: 3/8 beta strep group G, repeats 3/9 negative)  Urine Culture:   [] None drawn      [x] Negative             []  Positive (Details:  )  Sputum Culture:  [x] None drawn      [] Negative             []  Positive (Details:  )   Wound Culture:  [] None drawn      [] Negative             [x]  Positive (Details: S. Aureus, Proteus sp 3/8  )     Other pertinent Labs:        Radiology/Imaging:     Chest Xray (3/15/2022):  Endotracheal tube terminates near the aissatou and should be retracted   approximately 2-3 cm for more optimal positioning. No other change.      The findings were sent to the Radiology Results [address] at 8:00   am on 3/15/2022to be communicated to a licensed caregiver. ASSESSMENT:     Principal Problem:    Sepsis ([address]  Resolved Problems:    * No resolved hospital problems. *      PLAN:     WEAN PER PROTOCOL:  [] No   [x] Yes  [] N/A    DISCONTINUE ANY LABS:   [x] No   [] Yes    ICU PROPHYLAXIS:  Stress ulcer:  [x] PPI Agent  [] [id number] [] Sucralfate  [] Other:  VTE:   [] Enoxaparin  [] Unfract. Heparin Subcut  [] EPC Cuffs    NUTRITION:  [x] NPO [x] Tube Feeding (Specify: ) [] TPN  [] PO (Diet: Diet NPO  ADULT TUBE FEEDING; Nasogastric; Diabetic; Continuous; 10; Yes; 15; Q 8 hours; 50; 30; Q 6 hours)    HOME MEDICATIONS RECONCILED: [] No  [x] Yes    INSULIN DRIP:   [x] No   [] Yes    CONSULTATION NEEDED:  [x] No   [] Yes    FAMILY UPDATED:    [] No   [x] Yes    TRANSFER OUT OF ICU:   [x] No   [] Yes    SYSTEMS ASSESSMENT    Neuro     Intubated, sedated  Fentanyl, Versed, Precedex  Agitated at times    Agitation   Seroquel 50 mg BID  Precedex continued    Respiratory     Metabolic Alkalosis with Respiratory Acidosis  Intubated Day 8   AC/VC, FiO2 30, vet set 420, rate 16, PEEP 8   Was unable to be extubated yesterday, will likely try again today  Wean oxygen as tolerated.  Keep O2 sat 90-92%    Cardiovascular     Paroxysmal atrial fibrillation  Off amnio drip  Continue to monitor  Therapeutic Lovenox    Septic shock  Solu-Cortef 25 mg Q8H  Blood cultures positive Beta strep group G 3/8, repeats negative  Wound culture 3/8 Staph aureus and Proteus sp  Patient remains febrile  Off pressors  IV NS 20 mL/hr  See infectious disease below    Gastrointestinal     Possible Cholecystitis  Ultrasound 3/11 gallstones and edematous gallbladder wall  Consider surgery consult    GI PPx  Protonix 40 mg IV daily    Bowel Regimen  Colace 100 mg BID  Senna daily    Renal     CKD stage III, stable  Baseline creatinine 1.1-1.2  Continue to monitor    Electrolyte abnormalities  Replete as needed  Continue to monitor     Infectious Disease     Group G Streptococcus Septicemia  Likely 2/2 to cellulitis of leg (however, cultures grew Proteus)  Blood cultures positive x2 for beta strep group G  Ceftriaxone 2g Q24H (day 7, 1 day of cefepime prior)  Linezolid 600 mg Q12H (Day 4)  Remains febrile  Off pressors  ID following, appreciate input    Leukocytosis, resolved  Likely secondary to infection and steroid use  Remains febrile  Continue to monitor    Febrile  Tylenol suppository every 4 hours as needed for fever    Hematology/Oncology     Anticoagulation  Lovenox 135 mg twice daily      Chronic Normocytic Anemia  Baseline Hgb 9  Continue to monitor  Transfuse for Hgb <7    Endocrine     T2DM  DECREASE Lantus to 30 units twice daily d/t hypoglycemia  HDSSI  Hypoglycemia protocol  POC glucose checks    Social/Spiritual/DNR/Other     Full code    Silvia Pyle DO, PGY-1            3/15/2022, 7:02 AM    Attending Physician Attestation: Dr. Daren Kaplan    Thank you very much for allowing me to see this patient in consultation and follow up. I personally saw, examined and provided care for the patient. Radiographs, labs and medication list were reviewed by me independently. I spoke with bedside nursing, respiratory therapists and consultants. Critical care services and times documented are independent of procedures and multidisciplinary rounds with Residents. Additionally comprehensive, multidisciplinary rounds were conducted with the MICU team. The case was discussed in detail and plans for care were established. Review of Residents documentation was conducted and revisions were made as appropriate. I agree with the the above documented information.      Physical Examination:  Vitals:   Vitals:    03/15/22 0800 03/15/22 1126 03/15/22 1147 03/15/22 1300   BP: (!) 149/77      Pulse: 79 74 115    Resp: 24 27 (!) 34 25   Temp:       TempSrc:       SpO2: 97% 98% 92%    Weight: Height:          General: No Acute Distress, on vent, agitated   HEENT: normocephalic, atruamatic  CVS: RRR, S1, S2, No S3 or S4  Respiratory: decreased breath sounds at lung bases, no focal wheezes noted  Extremities: no clubbing/cyanosis/edema  Neuro: sedated, intubated      ASSESSMENT:  · Severe Sepsis with Septic Shock - lower extremity cellulitis, Grp G Streptococcus bacteremia/septicemia   · Probable cellulitis right lower extremity  · Group G Streptococcus septicemia probably associated to cellulitis cultures of the leg, however, grew Proteus  · Possible intra-abdominal infection.  Possible cholecystitis  · Acute Respiratory Failure - on ACVC mechanical ventilation    · Leukocytosis, improving  · Chronic Hypoxic Respiratory Failure - baseline 6L O2 NC  · Moderate/Severe COPD  · Elevation of transaminases.  Possible cholecystitis.  They seem to be trending downward.  Ultrasound shows gallstones and edematous gallbladder wall  · Morbid Obesity - BMI 51  · Atrial Fibrillation with RVR  · Chronic Diastolic CHF Dysfunction   · Chronic Lymphedema of the Lower Extremities      In addition the following applies:     Check: N/A  Medication Alterations: lantus to 30 units BID  Procedures: N/A  Imaging: reviewed  New Consultations: N/A  VENT: ACVC (DAY 8) 14/420/30/8  Ppeak: 40  Pplateau: 37  Compliance: 16     Access: Right IJ TLC (chnge to PICC line 3/15/2022)  Consults: ID, Dietary   Drips: Versed, Fentanyl, Precedex   Nutrition: Tube Feeds  ABX: Linezolid, Ceftriaxone     - wean vent as able, agitation apparent   - if/when extubation occurs patient will be transitioned to BIPAP/AVAPS in order to wean to supplemental oxygen given how poor baseline function is historically   - unable to liberate from mechanical ventilation 3/14, 3/15    Thank you for allowing me to participate in the care of this patient.     Care reviewed with nursing staff, medical and surgical specialty care, primary care and the patient's family as available. Restraints are ordered when the patient can do harm to him/herself by pulling out devices. Critical Care Time: > 35 minutes excluding procedures    Abelino Huggins M.D.     Abelino Huggins MD  3/15/2022  1:07 PM

## 2022-03-16 ENCOUNTER — APPOINTMENT (OUTPATIENT)
Dept: GENERAL RADIOLOGY | Age: 67
DRG: 870 | End: 2022-03-16
Payer: MEDICARE

## 2022-03-16 LAB
ALBUMIN SERPL-MCNC: 2.8 G/DL (ref 3.5–5.2)
ALP BLD-CCNC: 130 U/L (ref 35–104)
ALT SERPL-CCNC: 83 U/L (ref 0–32)
ANION GAP SERPL CALCULATED.3IONS-SCNC: 10 MMOL/L (ref 7–16)
ANION GAP SERPL CALCULATED.3IONS-SCNC: 6 MMOL/L (ref 7–16)
ANISOCYTOSIS: ABNORMAL
AST SERPL-CCNC: 22 U/L (ref 0–31)
ATYPICAL LYMPHOCYTE RELATIVE PERCENT: 0.9 % (ref 0–4)
B.E.: 7.9 MMOL/L (ref -3–3)
B.E.: 9.4 MMOL/L (ref -3–3)
BASOPHILS ABSOLUTE: 0.08 E9/L (ref 0–0.2)
BASOPHILS RELATIVE PERCENT: 0.9 % (ref 0–2)
BILIRUB SERPL-MCNC: 0.3 MG/DL (ref 0–1.2)
BUN BLDV-MCNC: 20 MG/DL (ref 6–23)
BUN BLDV-MCNC: 20 MG/DL (ref 6–23)
CALCIUM SERPL-MCNC: 7.7 MG/DL (ref 8.6–10.2)
CALCIUM SERPL-MCNC: 8.2 MG/DL (ref 8.6–10.2)
CHLORIDE BLD-SCNC: 104 MMOL/L (ref 98–107)
CHLORIDE BLD-SCNC: 106 MMOL/L (ref 98–107)
CO2: 32 MMOL/L (ref 22–29)
CO2: 33 MMOL/L (ref 22–29)
COHB: 1.1 % (ref 0–1.5)
COHB: 1.3 % (ref 0–1.5)
CREAT SERPL-MCNC: 0.8 MG/DL (ref 0.5–1)
CREAT SERPL-MCNC: 0.9 MG/DL (ref 0.5–1)
CRITICAL: ABNORMAL
CRITICAL: ABNORMAL
DATE ANALYZED: ABNORMAL
DATE ANALYZED: ABNORMAL
DATE OF COLLECTION: ABNORMAL
DATE OF COLLECTION: ABNORMAL
EOSINOPHILS ABSOLUTE: 0.33 E9/L (ref 0.05–0.5)
EOSINOPHILS RELATIVE PERCENT: 3.6 % (ref 0–6)
FIO2: 30 %
FIO2: 30 %
GFR AFRICAN AMERICAN: >60
GFR AFRICAN AMERICAN: >60
GFR NON-AFRICAN AMERICAN: >60 ML/MIN/1.73
GFR NON-AFRICAN AMERICAN: >60 ML/MIN/1.73
GLUCOSE BLD-MCNC: 168 MG/DL (ref 74–99)
GLUCOSE BLD-MCNC: 220 MG/DL (ref 74–99)
HCO3: 32.5 MMOL/L (ref 22–26)
HCO3: 34.1 MMOL/L (ref 22–26)
HCT VFR BLD CALC: 27.7 % (ref 34–48)
HEMOGLOBIN: 8 G/DL (ref 11.5–15.5)
HHB: 5.1 % (ref 0–5)
HHB: 7.8 % (ref 0–5)
HYPOCHROMIA: ABNORMAL
LAB: ABNORMAL
LAB: ABNORMAL
LACTIC ACID: 0.8 MMOL/L (ref 0.5–2.2)
LYMPHOCYTES ABSOLUTE: 1.73 E9/L (ref 1.5–4)
LYMPHOCYTES RELATIVE PERCENT: 17.8 % (ref 20–42)
Lab: ABNORMAL
Lab: ABNORMAL
MAGNESIUM: 2 MG/DL (ref 1.6–2.6)
MAGNESIUM: 2.1 MG/DL (ref 1.6–2.6)
MCH RBC QN AUTO: 24.7 PG (ref 26–35)
MCHC RBC AUTO-ENTMCNC: 28.9 % (ref 32–34.5)
MCV RBC AUTO: 85.5 FL (ref 80–99.9)
METER GLUCOSE: 111 MG/DL (ref 74–99)
METER GLUCOSE: 162 MG/DL (ref 74–99)
METER GLUCOSE: 178 MG/DL (ref 74–99)
METER GLUCOSE: 178 MG/DL (ref 74–99)
METER GLUCOSE: 192 MG/DL (ref 74–99)
METER GLUCOSE: 203 MG/DL (ref 74–99)
METHB: 0.2 % (ref 0–1.5)
METHB: 0.3 % (ref 0–1.5)
MODE: ABNORMAL
MODE: AC
MONOCYTES ABSOLUTE: 0.46 E9/L (ref 0.1–0.95)
MONOCYTES RELATIVE PERCENT: 4.5 % (ref 2–12)
NEUTROPHILS ABSOLUTE: 6.55 E9/L (ref 1.8–7.3)
NEUTROPHILS RELATIVE PERCENT: 72.3 % (ref 43–80)
NUCLEATED RED BLOOD CELLS: 0 /100 WBC
O2 CONTENT: 11.5 ML/DL
O2 CONTENT: 11.7 ML/DL
O2 SATURATION: 92.1 % (ref 92–98.5)
O2 SATURATION: 94.8 % (ref 92–98.5)
O2HB: 90.8 % (ref 94–97)
O2HB: 93.4 % (ref 94–97)
OPERATOR ID: 1893
OPERATOR ID: 3342
OVALOCYTES: ABNORMAL
PATIENT TEMP: 37 C
PATIENT TEMP: 37 C
PCO2: 46.7 MMHG (ref 35–45)
PCO2: 48.1 MMHG (ref 35–45)
PDW BLD-RTO: 16.1 FL (ref 11.5–15)
PEEP/CPAP: 5 CMH2O
PEEP/CPAP: 5 CMH2O
PFO2: 2.13 MMHG/%
PFO2: 2.45 MMHG/%
PH BLOOD GAS: 7.46 (ref 7.35–7.45)
PH BLOOD GAS: 7.47 (ref 7.35–7.45)
PHOSPHORUS: 3.8 MG/DL (ref 2.5–4.5)
PIP: 30 CMH2O
PLATELET # BLD: 248 E9/L (ref 130–450)
PMV BLD AUTO: 10.7 FL (ref 7–12)
PO2: 63.8 MMHG (ref 75–100)
PO2: 73.6 MMHG (ref 75–100)
POIKILOCYTES: ABNORMAL
POLYCHROMASIA: ABNORMAL
POTASSIUM REFLEX MAGNESIUM: 3.6 MMOL/L (ref 3.5–5)
POTASSIUM SERPL-SCNC: 4 MMOL/L (ref 3.5–5)
PRO-BNP: 5191 PG/ML (ref 0–125)
RBC # BLD: 3.24 E12/L (ref 3.5–5.5)
RR MECHANICAL: 14 B/MIN
RR MECHANICAL: 16 B/MIN
SODIUM BLD-SCNC: 144 MMOL/L (ref 132–146)
SODIUM BLD-SCNC: 147 MMOL/L (ref 132–146)
SOURCE, BLOOD GAS: ABNORMAL
SOURCE, BLOOD GAS: ABNORMAL
TEAR DROP CELLS: ABNORMAL
THB: 8.7 G/DL (ref 11.5–16.5)
THB: 9.1 G/DL (ref 11.5–16.5)
TIME ANALYZED: 1324
TIME ANALYZED: 534
TOTAL PROTEIN: 6.5 G/DL (ref 6.4–8.3)
TOXIC GRANULATION: ABNORMAL
TROPONIN, HIGH SENSITIVITY: 115 NG/L (ref 0–9)
VT MECHANICAL: 420 ML
WBC # BLD: 9.1 E9/L (ref 4.5–11.5)

## 2022-03-16 PROCEDURE — 2500000003 HC RX 250 WO HCPCS

## 2022-03-16 PROCEDURE — 96373 THER/PROPH/DIAG INJ IA: CPT

## 2022-03-16 PROCEDURE — 92960 CARDIOVERSION ELECTRIC EXT: CPT

## 2022-03-16 PROCEDURE — 83735 ASSAY OF MAGNESIUM: CPT

## 2022-03-16 PROCEDURE — 2500000003 HC RX 250 WO HCPCS: Performed by: INTERNAL MEDICINE

## 2022-03-16 PROCEDURE — 36600 WITHDRAWAL OF ARTERIAL BLOOD: CPT

## 2022-03-16 PROCEDURE — 36415 COLL VENOUS BLD VENIPUNCTURE: CPT

## 2022-03-16 PROCEDURE — 94664 DEMO&/EVAL PT USE INHALER: CPT

## 2022-03-16 PROCEDURE — 82962 GLUCOSE BLOOD TEST: CPT

## 2022-03-16 PROCEDURE — 94640 AIRWAY INHALATION TREATMENT: CPT

## 2022-03-16 PROCEDURE — 71045 X-RAY EXAM CHEST 1 VIEW: CPT

## 2022-03-16 PROCEDURE — 80048 BASIC METABOLIC PNL TOTAL CA: CPT

## 2022-03-16 PROCEDURE — 6360000002 HC RX W HCPCS: Performed by: STUDENT IN AN ORGANIZED HEALTH CARE EDUCATION/TRAINING PROGRAM

## 2022-03-16 PROCEDURE — 6370000000 HC RX 637 (ALT 250 FOR IP): Performed by: STUDENT IN AN ORGANIZED HEALTH CARE EDUCATION/TRAINING PROGRAM

## 2022-03-16 PROCEDURE — 6370000000 HC RX 637 (ALT 250 FOR IP): Performed by: INTERNAL MEDICINE

## 2022-03-16 PROCEDURE — 85025 COMPLETE CBC W/AUTO DIFF WBC: CPT

## 2022-03-16 PROCEDURE — 82805 BLOOD GASES W/O2 SATURATION: CPT

## 2022-03-16 PROCEDURE — 2580000003 HC RX 258: Performed by: INTERNAL MEDICINE

## 2022-03-16 PROCEDURE — 80053 COMPREHEN METABOLIC PANEL: CPT

## 2022-03-16 PROCEDURE — 6360000002 HC RX W HCPCS: Performed by: INTERNAL MEDICINE

## 2022-03-16 PROCEDURE — 2580000003 HC RX 258

## 2022-03-16 PROCEDURE — 2580000003 HC RX 258: Performed by: STUDENT IN AN ORGANIZED HEALTH CARE EDUCATION/TRAINING PROGRAM

## 2022-03-16 PROCEDURE — 94003 VENT MGMT INPAT SUBQ DAY: CPT

## 2022-03-16 PROCEDURE — 5A2204Z RESTORATION OF CARDIAC RHYTHM, SINGLE: ICD-10-PCS | Performed by: INTERNAL MEDICINE

## 2022-03-16 PROCEDURE — 36592 COLLECT BLOOD FROM PICC: CPT

## 2022-03-16 PROCEDURE — 84100 ASSAY OF PHOSPHORUS: CPT

## 2022-03-16 PROCEDURE — 83880 ASSAY OF NATRIURETIC PEPTIDE: CPT

## 2022-03-16 PROCEDURE — C9113 INJ PANTOPRAZOLE SODIUM, VIA: HCPCS | Performed by: INTERNAL MEDICINE

## 2022-03-16 PROCEDURE — 84484 ASSAY OF TROPONIN QUANT: CPT

## 2022-03-16 PROCEDURE — 6360000002 HC RX W HCPCS

## 2022-03-16 PROCEDURE — 83605 ASSAY OF LACTIC ACID: CPT

## 2022-03-16 PROCEDURE — 93005 ELECTROCARDIOGRAM TRACING: CPT | Performed by: INTERNAL MEDICINE

## 2022-03-16 PROCEDURE — 2000000000 HC ICU R&B

## 2022-03-16 RX ORDER — IPRATROPIUM BROMIDE AND ALBUTEROL SULFATE 2.5; .5 MG/3ML; MG/3ML
1 SOLUTION RESPIRATORY (INHALATION) EVERY 4 HOURS PRN
Status: DISCONTINUED | OUTPATIENT
Start: 2022-03-16 | End: 2022-03-22 | Stop reason: HOSPADM

## 2022-03-16 RX ORDER — METOPROLOL TARTRATE 5 MG/5ML
5 INJECTION INTRAVENOUS ONCE
Status: COMPLETED | OUTPATIENT
Start: 2022-03-16 | End: 2022-03-16

## 2022-03-16 RX ORDER — ADENOSINE 3 MG/ML
INJECTION, SOLUTION INTRAVENOUS
Status: COMPLETED
Start: 2022-03-16 | End: 2022-03-16

## 2022-03-16 RX ORDER — BUDESONIDE 0.5 MG/2ML
0.5 INHALANT ORAL 2 TIMES DAILY
Status: DISCONTINUED | OUTPATIENT
Start: 2022-03-16 | End: 2022-03-22 | Stop reason: HOSPADM

## 2022-03-16 RX ORDER — DILTIAZEM HYDROCHLORIDE 5 MG/ML
INJECTION INTRAVENOUS
Status: COMPLETED
Start: 2022-03-16 | End: 2022-03-16

## 2022-03-16 RX ORDER — AMIODARONE HYDROCHLORIDE 50 MG/ML
INJECTION, SOLUTION INTRAVENOUS
Status: COMPLETED
Start: 2022-03-16 | End: 2022-03-16

## 2022-03-16 RX ORDER — LABETALOL HYDROCHLORIDE 5 MG/ML
INJECTION, SOLUTION INTRAVENOUS
Status: COMPLETED
Start: 2022-03-16 | End: 2022-03-16

## 2022-03-16 RX ORDER — DIAZEPAM 5 MG/1
5 TABLET ORAL EVERY 6 HOURS
Status: DISCONTINUED | OUTPATIENT
Start: 2022-03-16 | End: 2022-03-20

## 2022-03-16 RX ORDER — METOPROLOL TARTRATE 5 MG/5ML
INJECTION INTRAVENOUS
Status: COMPLETED
Start: 2022-03-16 | End: 2022-03-16

## 2022-03-16 RX ORDER — IPRATROPIUM BROMIDE AND ALBUTEROL SULFATE 2.5; .5 MG/3ML; MG/3ML
1 SOLUTION RESPIRATORY (INHALATION)
Status: DISCONTINUED | OUTPATIENT
Start: 2022-03-16 | End: 2022-03-22 | Stop reason: HOSPADM

## 2022-03-16 RX ORDER — ARFORMOTEROL TARTRATE 15 UG/2ML
15 SOLUTION RESPIRATORY (INHALATION) 2 TIMES DAILY
Status: DISCONTINUED | OUTPATIENT
Start: 2022-03-16 | End: 2022-03-22 | Stop reason: HOSPADM

## 2022-03-16 RX ORDER — OXYCODONE HCL 5 MG/5 ML
5 SOLUTION, ORAL ORAL EVERY 6 HOURS
Status: DISCONTINUED | OUTPATIENT
Start: 2022-03-16 | End: 2022-03-16

## 2022-03-16 RX ORDER — OXYCODONE HCL 5 MG/5 ML
10 SOLUTION, ORAL ORAL EVERY 6 HOURS
Status: DISCONTINUED | OUTPATIENT
Start: 2022-03-16 | End: 2022-03-20

## 2022-03-16 RX ORDER — BUMETANIDE 0.25 MG/ML
1 INJECTION, SOLUTION INTRAMUSCULAR; INTRAVENOUS 2 TIMES DAILY
Status: COMPLETED | OUTPATIENT
Start: 2022-03-16 | End: 2022-03-17

## 2022-03-16 RX ADMIN — ENOXAPARIN SODIUM 135 MG: 150 INJECTION SUBCUTANEOUS at 21:22

## 2022-03-16 RX ADMIN — IPRATROPIUM BROMIDE AND ALBUTEROL SULFATE 1 AMPULE: 2.5; .5 SOLUTION RESPIRATORY (INHALATION) at 08:43

## 2022-03-16 RX ADMIN — IPRATROPIUM BROMIDE AND ALBUTEROL SULFATE 1 AMPULE: .5; 3 SOLUTION RESPIRATORY (INHALATION) at 10:11

## 2022-03-16 RX ADMIN — OXYCODONE HYDROCHLORIDE 5 MG: 5 SOLUTION ORAL at 09:51

## 2022-03-16 RX ADMIN — ADENOSINE 6 MG: 3 INJECTION INTRAVENOUS at 15:15

## 2022-03-16 RX ADMIN — OXYCODONE HYDROCHLORIDE 10 MG: 5 SOLUTION ORAL at 14:08

## 2022-03-16 RX ADMIN — Medication 10 MG/HR: at 20:00

## 2022-03-16 RX ADMIN — SODIUM CHLORIDE, PRESERVATIVE FREE 10 ML: 5 INJECTION INTRAVENOUS at 21:05

## 2022-03-16 RX ADMIN — BUMETANIDE 1 MG: 0.25 INJECTION INTRAMUSCULAR; INTRAVENOUS at 10:04

## 2022-03-16 RX ADMIN — Medication 200 MCG/HR: at 03:59

## 2022-03-16 RX ADMIN — IPRATROPIUM BROMIDE AND ALBUTEROL SULFATE 1 AMPULE: 2.5; .5 SOLUTION RESPIRATORY (INHALATION) at 20:13

## 2022-03-16 RX ADMIN — DEXMEDETOMIDINE HYDROCHLORIDE 1.5 MCG/KG/HR: 100 INJECTION, SOLUTION INTRAVENOUS at 20:25

## 2022-03-16 RX ADMIN — CHLORHEXIDINE GLUCONATE 0.12% ORAL RINSE 15 ML: 1.2 LIQUID ORAL at 09:05

## 2022-03-16 RX ADMIN — DIAZEPAM 5 MG: 5 TABLET ORAL at 17:08

## 2022-03-16 RX ADMIN — SENNOSIDES 17.6 MG: 8.8 SYRUP ORAL at 21:05

## 2022-03-16 RX ADMIN — DEXMEDETOMIDINE HYDROCHLORIDE 1.5 MCG/KG/HR: 100 INJECTION, SOLUTION INTRAVENOUS at 03:40

## 2022-03-16 RX ADMIN — LINEZOLID 600 MG: 100 SUSPENSION ORAL at 21:05

## 2022-03-16 RX ADMIN — HYDROCORTISONE SODIUM SUCCINATE 25 MG: 100 INJECTION, POWDER, FOR SOLUTION INTRAMUSCULAR; INTRAVENOUS at 05:45

## 2022-03-16 RX ADMIN — INSULIN LISPRO 3 UNITS: 100 INJECTION, SOLUTION INTRAVENOUS; SUBCUTANEOUS at 05:12

## 2022-03-16 RX ADMIN — DEXMEDETOMIDINE HYDROCHLORIDE 1.5 MCG/KG/HR: 100 INJECTION, SOLUTION INTRAVENOUS at 05:30

## 2022-03-16 RX ADMIN — INSULIN LISPRO 3 UNITS: 100 INJECTION, SOLUTION INTRAVENOUS; SUBCUTANEOUS at 20:58

## 2022-03-16 RX ADMIN — MICONAZOLE NITRATE: 20 POWDER TOPICAL at 21:38

## 2022-03-16 RX ADMIN — DIAZEPAM 5 MG: 5 TABLET ORAL at 12:43

## 2022-03-16 RX ADMIN — MUPIROCIN: 20 OINTMENT TOPICAL at 09:05

## 2022-03-16 RX ADMIN — ARFORMOTEROL TARTRATE 15 MCG: 15 SOLUTION RESPIRATORY (INHALATION) at 20:13

## 2022-03-16 RX ADMIN — ACETAMINOPHEN 650 MG: 325 TABLET ORAL at 21:05

## 2022-03-16 RX ADMIN — DEXMEDETOMIDINE HYDROCHLORIDE 1.5 MCG/KG/HR: 100 INJECTION, SOLUTION INTRAVENOUS at 10:03

## 2022-03-16 RX ADMIN — DEXMEDETOMIDINE HYDROCHLORIDE 1.5 MCG/KG/HR: 100 INJECTION, SOLUTION INTRAVENOUS at 01:43

## 2022-03-16 RX ADMIN — DOCUSATE SODIUM LIQUID 100 MG: 100 LIQUID ORAL at 21:05

## 2022-03-16 RX ADMIN — BUMETANIDE 1 MG: 0.25 INJECTION INTRAMUSCULAR; INTRAVENOUS at 21:32

## 2022-03-16 RX ADMIN — INSULIN GLARGINE 30 UNITS: 100 INJECTION, SOLUTION SUBCUTANEOUS at 09:06

## 2022-03-16 RX ADMIN — BUDESONIDE 500 MCG: 0.5 SUSPENSION RESPIRATORY (INHALATION) at 20:14

## 2022-03-16 RX ADMIN — CHLORHEXIDINE GLUCONATE 0.12% ORAL RINSE 15 ML: 1.2 LIQUID ORAL at 21:05

## 2022-03-16 RX ADMIN — ENOXAPARIN SODIUM 135 MG: 150 INJECTION SUBCUTANEOUS at 09:05

## 2022-03-16 RX ADMIN — INSULIN LISPRO 6 UNITS: 100 INJECTION, SOLUTION INTRAVENOUS; SUBCUTANEOUS at 15:56

## 2022-03-16 RX ADMIN — HYDROCORTISONE SODIUM SUCCINATE 25 MG: 100 INJECTION, POWDER, FOR SOLUTION INTRAMUSCULAR; INTRAVENOUS at 13:52

## 2022-03-16 RX ADMIN — DEXTROSE 1 MG/MIN: 5 SOLUTION INTRAVENOUS at 16:41

## 2022-03-16 RX ADMIN — METOPROLOL TARTRATE 5 MG: 5 INJECTION INTRAVENOUS at 17:02

## 2022-03-16 RX ADMIN — DILTIAZEM HYDROCHLORIDE 25 MG: 5 INJECTION INTRAVENOUS at 15:31

## 2022-03-16 RX ADMIN — AMIODARONE HYDROCHLORIDE 150 MG: 50 INJECTION, SOLUTION INTRAVENOUS at 15:46

## 2022-03-16 RX ADMIN — SENNOSIDES 17.6 MG: 8.8 SYRUP ORAL at 09:05

## 2022-03-16 RX ADMIN — MICONAZOLE NITRATE: 20 POWDER TOPICAL at 09:05

## 2022-03-16 RX ADMIN — OXYCODONE HYDROCHLORIDE 10 MG: 5 SOLUTION ORAL at 21:22

## 2022-03-16 RX ADMIN — Medication 5 MG/HR: at 10:14

## 2022-03-16 RX ADMIN — DEXMEDETOMIDINE HYDROCHLORIDE 1.5 MCG/KG/HR: 100 INJECTION, SOLUTION INTRAVENOUS at 18:17

## 2022-03-16 RX ADMIN — IPRATROPIUM BROMIDE AND ALBUTEROL SULFATE 1 AMPULE: 2.5; .5 SOLUTION RESPIRATORY (INHALATION) at 13:48

## 2022-03-16 RX ADMIN — INSULIN LISPRO 3 UNITS: 100 INJECTION, SOLUTION INTRAVENOUS; SUBCUTANEOUS at 12:22

## 2022-03-16 RX ADMIN — INSULIN GLARGINE 30 UNITS: 100 INJECTION, SOLUTION SUBCUTANEOUS at 20:59

## 2022-03-16 RX ADMIN — Medication 200 MCG/HR: at 22:38

## 2022-03-16 RX ADMIN — DEXMEDETOMIDINE HYDROCHLORIDE 1.5 MCG/KG/HR: 100 INJECTION, SOLUTION INTRAVENOUS at 22:17

## 2022-03-16 RX ADMIN — LINEZOLID 600 MG: 100 SUSPENSION ORAL at 09:25

## 2022-03-16 RX ADMIN — ARFORMOTEROL TARTRATE 15 MCG: 15 SOLUTION RESPIRATORY (INHALATION) at 08:43

## 2022-03-16 RX ADMIN — PANTOPRAZOLE SODIUM 40 MG: 40 INJECTION, POWDER, FOR SOLUTION INTRAVENOUS at 10:04

## 2022-03-16 RX ADMIN — METOPROLOL TARTRATE 12.5 MG: 25 TABLET, FILM COATED ORAL at 09:05

## 2022-03-16 RX ADMIN — BUDESONIDE 500 MCG: 0.5 SUSPENSION RESPIRATORY (INHALATION) at 08:43

## 2022-03-16 RX ADMIN — QUETIAPINE FUMARATE 50 MG: 25 TABLET ORAL at 21:05

## 2022-03-16 RX ADMIN — METOPROLOL TARTRATE 12.5 MG: 25 TABLET, FILM COATED ORAL at 11:36

## 2022-03-16 RX ADMIN — AMIODARONE HYDROCHLORIDE 150 MG: 50 INJECTION, SOLUTION INTRAVENOUS at 16:00

## 2022-03-16 RX ADMIN — MUPIROCIN: 20 OINTMENT TOPICAL at 21:39

## 2022-03-16 RX ADMIN — POTASSIUM BICARBONATE 40 MEQ: 782 TABLET, EFFERVESCENT ORAL at 22:11

## 2022-03-16 RX ADMIN — Medication 200 MCG/HR: at 15:59

## 2022-03-16 RX ADMIN — INSULIN LISPRO 3 UNITS: 100 INJECTION, SOLUTION INTRAVENOUS; SUBCUTANEOUS at 08:55

## 2022-03-16 RX ADMIN — DOCUSATE SODIUM LIQUID 100 MG: 100 LIQUID ORAL at 09:05

## 2022-03-16 RX ADMIN — DEXMEDETOMIDINE HYDROCHLORIDE 1.5 MCG/KG/HR: 100 INJECTION, SOLUTION INTRAVENOUS at 07:29

## 2022-03-16 RX ADMIN — HYDROCORTISONE SODIUM SUCCINATE 25 MG: 100 INJECTION, POWDER, FOR SOLUTION INTRAMUSCULAR; INTRAVENOUS at 21:36

## 2022-03-16 RX ADMIN — QUETIAPINE FUMARATE 50 MG: 25 TABLET ORAL at 09:05

## 2022-03-16 RX ADMIN — Medication 200 MCG/HR: at 10:32

## 2022-03-16 ASSESSMENT — PULMONARY FUNCTION TESTS
PIF_VALUE: 36
PIF_VALUE: 36
PIF_VALUE: 46
PIF_VALUE: 36
PIF_VALUE: 49
PIF_VALUE: 40
PIF_VALUE: 34
PIF_VALUE: 40
PIF_VALUE: 36
PIF_VALUE: 41
PIF_VALUE: 36
PIF_VALUE: 51
PIF_VALUE: 52
PIF_VALUE: 46
PIF_VALUE: 36
PIF_VALUE: 36
PIF_VALUE: 40
PIF_VALUE: 52
PIF_VALUE: 36
PIF_VALUE: 46
PIF_VALUE: 35
PIF_VALUE: 41
PIF_VALUE: 36
PIF_VALUE: 41
PIF_VALUE: 43
PIF_VALUE: 36
PIF_VALUE: 41
PIF_VALUE: 36

## 2022-03-16 ASSESSMENT — PAIN SCALES - GENERAL
PAINLEVEL_OUTOF10: 0
PAINLEVEL_OUTOF10: 7

## 2022-03-16 NOTE — CARE COORDINATION
Continue ICU, continue vent support/PS today. Condition fentanyl and versed drips, precedex stopped. Continue TF. [facility] referral called to [facility].  Will follow-mjo

## 2022-03-16 NOTE — PROGRESS NOTES
Pulmonary Subsequent Hospital F/U note    Patient is being followed for: acute on chronic hypoxic and hypercapnic respiratory failure     Interval HPI:  Remains critically ill on mechanical ventilation  She was awake and agitated although able to be redirected and then calms  Still having fevers  She has been changed to pressure control ventilation by ICU team  Settings: ACPC 30%, 14, Pi 35, PEEP 5  +15L net positive for hospital stay. ROS:  Unable to obtain       Exam:  BP (!) 161/71   Pulse 90   Temp 100.9 °F (38.3 °C) (Bladder)   Resp 19   Ht 5' 4\" (1.626 m)   Wt (!) 330 lb 3.2 oz (149.8 kg)   LMP  (LMP Unknown)   SpO2 92%   BMI 56.68 kg/m²    General: Patient is intubated and agitated but directable  HEENT: PERRL, EOMI, ETT in place   Neck: supple no adenopathy  CV: RRR without murmur  Lungs: decreased BS diffusely  Abd: soft, ND, NT, bowel sounds normal  Ext: warm, extensive chronic lymphedema of the legs     Data:    Oximetry:  SpO2 Readings from Last 1 Encounters:   03/16/22 92%       Imaging personally reviewed by myself:  CXR     Decreased lung volumes with hazy bilateral airspace disease which could   represent CHF or ground-glass pneumonia.           Pertinent labs reviewed and noted:  Lab Results   Component Value Date    WBC 9.1 03/16/2022    HGB 8.0 03/16/2022    HCT 27.7 03/16/2022    MCV 85.5 03/16/2022    MCH 24.7 03/16/2022    MCHC 28.9 03/16/2022    RDW 16.1 03/16/2022     03/16/2022    MPV 10.7 03/16/2022     Lab Results   Component Value Date     03/16/2022    K 4.0 03/16/2022    K 5.7 03/08/2022     03/16/2022    CO2 32 03/16/2022    BUN 20 03/16/2022    CREATININE 0.8 03/16/2022    LABALBU 2.8 03/16/2022    LABALBU 4.5 11/02/2011    CALCIUM 8.2 03/16/2022    GFRAA >60 03/16/2022    LABGLOM >60 03/16/2022     Lab Results   Component Value Date    PROTIME 19.5 04/29/2021    INR 1.8 04/29/2021       Assessment:  1.  Acute on chronic hypoxic and hypercapnic respiratory failure  2. Atelectasis  3. Group G strep bacteremia  4. Fevers  5. Cellulitis R lower extremity  6. Morbid obesity BMI 56  7. Lymphedema  8. Septic shock, resolved  9. Atrial fibrillation   10. HFpEF  11. Pulmonary hypertension  12. COPD    Plan:  1. Vent was changed to pressure control - few small changes made (Pi reduced to 30 and itime decreased to facilitate longer I:E time)  2. Oral sedatives added   3. Holding rocephin per ID - monitor fevers  4. Diuretics started  5.  ICU level care    If no improvement in weaning trials over the next several days may need to consider tracheostomy     Electronically signed by Susan Serra MD on 3/16/2022 at 1:15 PM

## 2022-03-16 NOTE — SIGNIFICANT EVENT
Notified by nursing staff the patient's heart rate was in the 200s. Into the room blood pressure stable in the 120s over 60s. On evaluation of the monitor patient appeared to be in a narrow complex tachycardia. Unable to ascertain whether it was A. fib versus SVT. Patient placed on pads patient given adenosine 6 mg x 1 with minimal improvement patient given 12 mg of adenosine with significant slowing heart rate observed. Heart rhythm appeared to be A. fib at that time. Repeat blood pressure demonstrated mild hypotension BP now 80s over 50s. Decision made to proceed with cardioversion please see separate procedure note. Patient remained with elevated heart rate after cardioversion. Patient given 5 Cardizem with mild improvement in heart rate to the 150s. Was made to load patient with 150 mg of amiodarone and started on amiodarone infusion. Stat laboratory work drawn, chest x-ray obtained. Cardiology consult placed. On reevaluation of blood pressure patient is now hemodynamically stable with a blood pressure in the 130s over 80s. Continue to monitor patient closely. Dr. Yun Salazar was present for all critical portions of events.      Amanda Alvarez,

## 2022-03-16 NOTE — PATIENT CARE CONFERENCE
..  Intensive Care Daily Quality Rounding Checklist      ICU Team Members: Miladis Cifuentes, Franklyn Edwards, charge nurse, bedside nurse, clinical pharmacist    ICU Day #: NUMBER: 8    Intubation Date: March 8    Ventilator Day #: NUMBER: 9    Central Line Insertion Date: March 8        Day #: NUMBER: 9     Arterial Line Insertion Date:  n/a      Day #: n/a    Temporary Hemodialysis Catheter Insertion Date:  n/a      Day # n/a    DVT Prophylaxis: Lovenox    GI Prophylaxis: Protonix    Hansen Catheter Insertion Date: March 8       Day #: 9      Continued need (if yes, reason documented and discussed with physician): yes, accurate I & O    Skin Issues/ Wounds and ordered treatment discussed on rounds: yes, wound care following    Goals/ Plans for the Day: Daily labs & abgs, soft wrist restraints to prevent pulling tubes, cont to attempt to wean vent & sedation, PICC line placement

## 2022-03-16 NOTE — PROGRESS NOTES
Consulted for PICC line placement. When we arrived to department, patient heart rate 190-210. Staff entered room to attempt to stabilize heartrate. IV Team will attempt to assess for PICC line tomorrow (3/17/2022). [facility], RN (charge) aware.   Electronically signed by Reggie Gamez RN on 3/16/2022 at 3:51 PM

## 2022-03-16 NOTE — PROGRESS NOTES
tube feeds as well. 3/9: Patient has converted to sinus rhythm, opening eyes with spells of anxious awakenings. Levophed stopped. Remains on Vent. 3/8: Patient presented to the ICU with hypotension and respiratory failure. Patient was started on pressors and antibiotics at that time. Patient was also started on amiodarone drip for her A. fib. Patient was also started on insulin drip.     CURRENT VENTILATION STATUS:     [x] Ventilator  [] BIPAP  [] Nasal Cannula [] Room Air      IF INTUBATED, ET TUBE MARKING AT LOWER LIP:  cms    SECRETIONS  Amount:  [x] Small [] Moderate  [] Large  [] None  Color:     [] White [] Colored  [] Bloody    SEDATION:  RAAS Score:  [] Propofol gtt  [x] Versed gtt  [x] Fentanyl gtt [] Ativan gtt   [x] Precedex    PARALYZED:  [x] No    [] Yes      VASOPRESSORS:  [x] No    [] Yes    If yes -   [] Levophed       [] Dopamine     [] Vasopressin       [] Dobutamine  [] Phenylephrine         [] Epinephrine    CENTRAL LINES:     [] No   [x] Yes   (Date of Insertion: 3/8)           If yes -     [x] Right IJ     [] Left IJ [] Right Femoral [] Left Femoral                   [] Right Subclavian [] Left Subclavian       BRAUN'S CATHETER:   [] No   [x] Yes  (Date of Insertion: )     URINE OUTPUT:            [x] Good   [] Low              [] Anuric      OBJECTIVE:     VITAL SIGNS:  BP (!) 209/84   Pulse 59   Temp 100.9 °F (38.3 °C) (Bladder)   Resp 16   Ht 5' 4\" (1.626 m)   Wt (!) 330 lb 3.2 oz (149.8 kg)   LMP  (LMP Unknown)   SpO2 96%   BMI 56.68 kg/m²   Tmax over 24 hours:  Temp (24hrs), Av.1 °F (38.4 °C), Min:100.9 °F (38.3 °C), Max:101.5 °F (38.6 °C)      Patient Vitals for the past 6 hrs:   BP Temp Temp src Pulse Resp SpO2   22 0600 -- -- -- 59 16 96 %   22 0500 (!) 209/84 -- -- 75 24 96 %   22 0400 -- 100.9 °F (38.3 °C) Bladder 70 18 99 %   22 0346 -- -- -- 68 18 98 %   22 0300 -- -- -- 63 16 98 %   22 0242 -- -- -- 71 -- --   22 0200 -- -- -- 69 (!) 53 97 %         Intake/Output Summary (Last 24 hours) at 3/16/2022 0714  Last data filed at 3/16/2022 0610  Gross per 24 hour   Intake 4004.61 ml   Output 1828 ml   Net 2176.61 ml     Wt Readings from Last 2 Encounters:   03/14/22 (!) 330 lb 3.2 oz (149.8 kg)   08/31/21 (!) 304 lb (137.9 kg)     Body mass index is 56.68 kg/m². PHYSICAL EXAMINATION:    Physical Exam  Constitutional:       Appearance: She is morbidly obese. She is ill-appearing. Interventions: She is sedated and intubated. HENT:      Head: Normocephalic and atraumatic. Eyes:      Conjunctiva/sclera: Conjunctivae normal.   Neck:      Trachea: No tracheal deviation. Cardiovascular:      Rate and Rhythm: Normal rate and regular rhythm. Heart sounds: Normal heart sounds. Pulmonary:      Effort: Pulmonary effort is normal. She is intubated. Breath sounds: Decreased breath sounds present. Abdominal:      General: Bowel sounds are normal.      Palpations: Abdomen is soft. Tenderness: There is no abdominal tenderness. Musculoskeletal:      Cervical back: Neck supple. Right lower leg: Edema present. Left lower leg: Edema present. Lymphadenopathy:      Cervical: No cervical adenopathy. Skin:     General: Skin is warm and dry. Findings: No rash. Comments: +bl lower extremity lymphedema chronic skin changes with right leg fissure erythma   Neurological:      General: No focal deficit present.    Psychiatric:         Behavior: Behavior normal.      Any additional physical findings: n/a    MEDICATIONS:    Scheduled Meds:   insulin glargine  30 Units SubCUTAneous BID    lidocaine PF  5 mL IntraDERmal Once    sodium chloride flush  5-40 mL IntraVENous 2 times per day    heparin flush  3 mL IntraVENous 2 times per day    QUEtiapine  50 mg Oral BID    hydrocortisone sodium succinate PF  25 mg IntraVENous Q8H    metoprolol tartrate  12.5 mg Oral BID    linezolid  600 mg Per NG tube 2 times per day    mupirocin   Nasal BID    insulin lispro  0-18 Units SubCUTAneous Q4H    docusate  100 mg Per NG tube BID    senna  10 mL Per NG tube BID    sodium chloride flush  5-40 mL IntraVENous BID    enoxaparin  1 mg/kg SubCUTAneous BID    cefTRIAXone (ROCEPHIN) IV  2,000 mg IntraVENous Q24H    chlorhexidine  15 mL Mouth/Throat BID    pantoprazole  40 mg IntraVENous Daily    miconazole   Topical BID     Continuous Infusions:   sodium chloride      fentaNYL 5 mcg/ml in 0.9%  ml infusion 200 mcg/hr (03/16/22 0610)    dexmedetomidine (PRECEDEX) IV infusion 1.5 mcg/kg/hr (03/16/22 0610)    dextrose      midazolam 5 mg/hr (03/16/22 0610)    sodium chloride 10 mL/hr at 03/16/22 0610     PRN Meds:   sodium chloride flush, 5-40 mL, PRN  sodium chloride, 25 mL, PRN  heparin flush, 3 mL, PRN  white petrolatum, , BID PRN  fentanNYL, 25 mcg, Q2H PRN  dextrose bolus (hypoglycemia), 125 mL, PRN   Or  dextrose bolus (hypoglycemia), 250 mL, PRN  glucose, 15 g, PRN  glucagon (rDNA), 1 mg, PRN  dextrose, 100 mL/hr, PRN  midazolam, 2 mg, Q3H PRN  acetaminophen, 650 mg, Q4H PRN  acetaminophen, 650 mg, Q4H PRN          VENT SETTINGS (Comprehensive) (if applicable):  Vent Information  $Ventilation: $Subsequent Day  Skin Assessment: Clean, dry, & intact  Equipment ID: [id number]  Vent Type: [phone]  Vent Mode: AC/VC+  Vt Ordered: 420 mL  Rate Set: 16 bmp  Peak Flow: 0 L/min  Pressure Support: 0 cmH20  FiO2 : 30 %  SpO2: 96 %  SpO2/FiO2 ratio: 320  Sensitivity: 2  PEEP/CPAP: 5  I Time/ I Time %: 0.75 s  Humidification Source: Heated wire  Humidification Temp: 37  Humidification Temp Measured: 37  Circuit Condensation: Drained  Additional Respiratory  Assessments  Pulse: 59  Resp: 16  SpO2: 96 %  Position: Semi-Chapin's  Humidification Source: Heated wire  Humidification Temp: 37  Circuit Condensation: Drained  Oral Care: Mouth suctioned,Mouth moisturizer,Lip moisturizer applied  Subglottic Suction Done?: Yes  Cuff Pressure (cm H2O): 29 cm H2O    ABGs:   Recent Labs     03/16/22  0534   PH 7.461*   PCO2 46.7*   PO2 73.6*   HCO3 32.5*   BE 7.9*   O2SAT 94.8       Laboratory findings:    Complete Blood Count:   Recent Labs     03/14/22  0555 03/15/22  0530 03/16/22  0510   WBC 8.8 9.9 9.1   HGB 8.3* 8.0* 8.0*   HCT 28.2* 27.3* 27.7*    239 248        Last 3 Blood Glucose:   Recent Labs     03/14/22 0555 03/15/22  0530 03/16/22  0510   GLUCOSE 156* 75 168*        PT/INR:    Lab Results   Component Value Date    PROTIME 19.5 04/29/2021    INR 1.8 04/29/2021     PTT:    Lab Results   Component Value Date    APTT 36.3 04/29/2021       Comprehensive Metabolic Profile:   Recent Labs     03/14/22  0555 03/14/22  0555 03/15/22  0530 03/15/22  0530 03/16/22  0510     --  147*  --  144   K 3.4*  --  3.7  --  4.0     --  107  --  106   CO2 31*  --  33*  --  32*   BUN 24*  --  20  --  20   CREATININE 0.7  --  0.8  --  0.8   GLUCOSE 156*  --  75  --  168*   CALCIUM 8.4*  --  8.2*  --  8.2*   PROT 6.1*   < > 6.1*   < > 6.5   LABALBU 2.4*   < > 2.6*   < > 2.8*   BILITOT 0.3   < > 0.4   < > 0.3   ALKPHOS 149*   < > 123*   < > 130*   AST 57*   < > 29   < > 22   *   < > 118*   < > 83*    < > = values in this interval not displayed. Magnesium:   Lab Results   Component Value Date    MG 2.1 03/16/2022     Phosphorus:   Lab Results   Component Value Date    PHOS 3.8 03/16/2022     Ionized Calcium:   Lab Results   Component Value Date    CAION 1.37 10/24/2016        Urinalysis: No results found for: UA     Troponin: No results for input(s): TROPONINI in the last 72 hours.     Microbiology:   Culture, Blood 2   Date Value Ref Range Status   03/09/2022 5 Days no growth  Final       Cultures during this admission:     Blood cultures:  [] None drawn      [x] Negative             [x]  Positive (Details: 3/8 beta strep group G, repeats 3/9 negative)  Urine Culture:   [] None drawn      [x] Negative             []  Positive (Details:  )  Sputum Culture:  [x] None drawn      [] Negative             []  Positive (Details:  )   Wound Culture:  [] None drawn      [] Negative             [x]  Positive (Details: S. Aureus, Proteus sp 3/8  )     Other pertinent Labs:        Radiology/Imaging:     Chest Xray (3/16/2022):  Endotracheal tube terminates near the aissatou and should be retracted   approximately 2-3 cm for more optimal positioning. No other change. The findings were sent to the Radiology Results [address] at 8:00   am on 3/15/2022to be communicated to a licensed caregiver. ASSESSMENT:     Principal Problem:    Sepsis ([address]  Resolved Problems:    * No resolved hospital problems. *      PLAN:     WEAN PER PROTOCOL:  [] No   [x] Yes  [] N/A    DISCONTINUE ANY LABS:   [x] No   [] Yes    ICU PROPHYLAXIS:  Stress ulcer:  [x] PPI Agent  [] [id number] [] Sucralfate  [] Other:  VTE:   [] Enoxaparin  [] Unfract. Heparin Subcut  [] EPC Cuffs    NUTRITION:  [x] NPO [x] Tube Feeding (Specify: ) [] TPN  [] PO (Diet: Diet NPO  ADULT TUBE FEEDING; Nasogastric; Diabetic; Continuous; 10; Yes; 15; Q 8 hours; 50; 75; Q 4 hours)    HOME MEDICATIONS RECONCILED: [] No  [x] Yes    INSULIN DRIP:   [x] No   [] Yes    CONSULTATION NEEDED:  [x] No   [] Yes    FAMILY UPDATED:    [] No   [x] Yes    TRANSFER OUT OF ICU:   [x] No   [] Yes    SYSTEMS ASSESSMENT    Neuro     Intubated, sedated  Fentanyl, Versed, Precedex  Agitated at times    Agitation   Seroquel 50 mg BID  Precedex continued  Will transition to oxycodone and valium Q 6 hours    Respiratory     Metabolic Alkalosis with Respiratory Acidosis  Intubated Day 8   AC/VC, FiO2 30, vet set 420, rate 16, PEEP 8   Was unable to be extubated yesterday  Wean oxygen as tolerated.  Keep O2 sat 90-92%\  Pulmonology consulted  Bumex ordered for diuresis   Pressure support as tolerated     Cardiovascular     Paroxysmal atrial fibrillation  Off amnio drip  Continue to monitor  Therapeutic Lovenox    Septic shock  Solu-Cortef 25 mg Q8H  Blood cultures positive Beta strep group G 3/8, repeats negative  Wound culture 3/8 Staph aureus and Proteus sp  Patient remains febrile  Off pressors  IV NS 20 mL/hr  See infectious disease below    Gastrointestinal     Possible Cholecystitis  Ultrasound 3/11 gallstones and edematous gallbladder wall  Consider surgery consult    GI PPx  Protonix 40 mg IV daily    Bowel Regimen  Colace 100 mg BID  Senna daily    Renal     CKD stage III, stable  Baseline creatinine 1.1-1.2  Continue to monitor    Electrolyte abnormalities  Replete as needed  Continue to monitor     Infectious Disease     Group G Streptococcus Septicemia  Likely 2/2 to cellulitis of leg (however, cultures grew Proteus)  Blood cultures positive x2 for beta strep group G  Ceftriaxone 2g Q24H (completed 7 days with 1 day of cefepime prior), D/C on 3/16/2022  Linezolid 600 mg Q12H (Day 4)  Remains febrile  Off pressors  ID following, appreciate input    Leukocytosis, resolved  Likely secondary to infection and steroid use  Remains febrile  Continue to monitor    Febrile  Tylenol suppository every 4 hours as needed for fever    Hematology/Oncology     Anticoagulation  Lovenox 135 mg twice daily      Chronic Normocytic Anemia  Baseline Hgb 9  Continue to monitor  Transfuse for Hgb <7    Endocrine     T2DM  On  Lantus to 30 units twice daily d/t hypoglycemia  HDSSI  Hypoglycemia protocol  POC glucose checks    Social/Spiritual/DNR/Other     Full code    Rebeca Contreras DO, PGY-1            3/16/2022, 7:14 AM    Attending Physician Attestation: Dr. Lulu Gayle    Thank you very much for allowing me to see this patient in consultation and follow up. I personally saw, examined and provided care for the patient. Radiographs, labs and medication list were reviewed by me independently. I spoke with bedside nursing, respiratory therapists and consultants.  Critical care services and times documented are independent of procedures and multidisciplinary rounds with Residents. Additionally comprehensive, multidisciplinary rounds were conducted with the MICU team. The case was discussed in detail and plans for care were established. Review of Residents documentation was conducted and revisions were made as appropriate. I agree with the the above documented information.      Physical Examination:  Vitals:   Vitals:    03/16/22 1011 03/16/22 1012 03/16/22 1100 03/16/22 1208   BP:   (!) 161/71    Pulse:  96 79 90   Resp: (!) 69 25 18 19   Temp:       TempSrc:       SpO2: 94% 94% 92% 92%   Weight:       Height:          General: No Acute Distress, on vent, agitated   HEENT: normocephalic, atruamatic  CVS: RRR, S1, S2, No S3 or S4  Respiratory: decreased breath sounds at lung bases, no focal wheezes noted  Extremities: no clubbing/cyanosis/edema  Neuro: sedated, intubated      ASSESSMENT:  · Severe Sepsis with Septic Shock - lower extremity cellulitis, Grp G Streptococcus bacteremia/septicemia   · Probable cellulitis right lower extremity  · Group G Streptococcus septicemia probably associated to cellulitis cultures of the leg, however, grew Proteus  · Possible intra-abdominal infection.  Possible cholecystitis  · Acute Respiratory Failure - on ACVC mechanical ventilation    · Leukocytosis, improving  · Chronic Hypoxic Respiratory Failure - baseline 6L O2 NC  · Moderate/Severe COPD  · Elevation of transaminases.  Possible cholecystitis.  They seem to be trending downward.  Ultrasound shows gallstones and edematous gallbladder wall  · Morbid Obesity - BMI 51  · Atrial Fibrillation with RVR - DCCV x 1 cardioversion 3/16/2022, cardizem and amiodarone given, with initiation of amiodarone drip    · Chronic Diastolic CHF Dysfunction   · Chronic Lymphedema of the Lower Extremities      In addition the following applies:     Check: N/A  Medication Alterations: lantus to 30 units BID, bumex 1 mg IV BID, duoneb, brovana, pulmicort  Procedures: N/A  Imaging: reviewed  New Consultations: N/A  VENT: ACPC (DAY 9) 14/35/40/5  Ppeak: 40  Pplateau: 30  IERRZVOPOJ: 81     Access: Right IJ TLC (change to PICC line 3/16/2022)  Consults: ID, Dietary   Drips: Versed, Fentanyl  Nutrition: Tube Feeds  ABX: Linezolid, Ceftriaxone     - wean vent as able, agitation apparent   - if/when extubation occurs patient will be transitioned to BIPAP/AVAPS in order to wean to supplemental oxygen given how poor baseline function is historically   - unable to liberate from mechanical ventilation 3/14, 3/15, 3/16  - 15L + at this time as of 3/16/2022, diuresis, supportive care, and reassess for extubation 3/17/2022  - DCCV x 1 cardioversion 3/16/2022, cardizem and amiodarone given, with initiation of amiodarone drip    - cardiology consultation   - patient already on therapeutic anticoagulation with lovenox subcutaneous     Thank you for allowing me to participate in the care of this patient. Care reviewed with nursing staff, medical and surgical specialty care, primary care and the patient's family as available. Restraints are ordered when the patient can do harm to him/herself by pulling out devices. Critical Care Time: > 35 minutes excluding procedures    Flavia Casiano M.D.     Flavia Casiano MD  3/16/2022  12:19 PM

## 2022-03-16 NOTE — PROGRESS NOTES
Subjective:    Remains intubated  in ICU setting  She is awake on Precedex and Versed  no acute events overnight    Objective:    BP (!) 92/48   Pulse 122   Temp 101.1 °F (38.4 °C) (Bladder)   Resp 24   Ht 5' 4\" (1.626 m)   Wt (!) 330 lb 3.2 oz (149.8 kg)   LMP  (LMP Unknown)   SpO2 94%   BMI 56.68 kg/m²     In: 1986.4 [I.V.:1252.4; NG/GT:734]  Out: 403   In: 1986.4   Out: 403 [Urine:403]    General appearance intubated awake, poor hygiene malodorous  HEENT: AT/NC, MMM  Neck: FROM, supple  Lungs: Clear to auscultation  CV: RRR, no MRGs  Vasc: Radial pulses 2+  Abdomen: Soft, non-tender; no masses or HSM  Extremities: Ichthyosis bilateral lower extremities-bilateral lower extremities in dressing  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  *      Plan:    78-year-old woman with multiple comorbidities admitted to ICU setting for septic shock on triple pressor support, broad-spectrum IV antibiotic therapy, intubated sedated     IV fluid resuscitation for marked lactic acidosis-resolved  Vent wean as able per critical care team  Off pressor support, start midodrine if needed  on Solu-Cortef 3 times daily-wean now the blood pressure is improved-contributing to marked elevation in blood sugars  -blood cultures with alpha hemolytic strep  Broad-spectrum IV antibiotics with infectious disease following-Merrem has been transitioned to Rocephin 3/9/2021-beta strep group G/ linezolid added-white count resolved  Await ruq us -hepatomegaly with diffuse fatty infiltration/gallstones and sludge  Amiodarone drip discontinued  Glargine, insulin sliding scale for blood sugar management, consider insulin drip in critically ill woman, blood sugars spike up to 400 range again  Monitor renal function for possible prerenal failure given hypotension-improving 56/1.2  No evidence of diabetic ketoacidosis-continue to monitor, blood sugars adequately controlled  Initiation of Precedex, fentanyl and Versed for sedation/agitation    Daily labs   Case discussed with RN at bedside  DVT Prophylaxis   PT/OT  Discharge [address] MD Satish  7:15 PM  3/16/2022

## 2022-03-16 NOTE — PLAN OF CARE
Problem: Non-Violent Restraints  Goal: Removal from restraints as soon as assessed to be safe  Outcome: Not Met This Shift  Goal: No harm/injury to patient while restraints in use  Outcome: Met This Shift  Goal: Patient's dignity will be maintained  Outcome: Met This Shift     Problem: Discharge Planning:  Goal: Participates in care planning  Description: Participates in care planning  Outcome: Not Met This Shift  Goal: Discharged to appropriate level of care  Description: Discharged to appropriate level of care  Outcome: Not Met This Shift     Problem: Airway Clearance - Ineffective:  Goal: Ability to maintain a clear airway will improve  Description: Ability to maintain a clear airway will improve  Outcome: Ongoing     Problem: Anxiety/Stress:  Goal: Level of anxiety will decrease  Description: Level of anxiety will decrease  Outcome: Not Met This Shift     Problem: Aspiration:  Goal: Absence of aspiration  Description: Absence of aspiration  Outcome: Met This Shift     Problem:  Bowel Function - Altered:  Goal: Bowel elimination is within specified parameters  Description: Bowel elimination is within specified parameters  Outcome: Not Met This Shift     Problem: Cardiac Output - Decreased:  Goal: Hemodynamic stability will improve  Description: Hemodynamic stability will improve  Outcome: Ongoing     Problem: Fluid Volume - Imbalance:  Goal: Absence of imbalanced fluid volume signs and symptoms  Description: Absence of imbalanced fluid volume signs and symptoms  Outcome: Ongoing     Problem: Gas Exchange - Impaired:  Goal: Levels of oxygenation will improve  Description: Levels of oxygenation will improve  Outcome: Ongoing     Problem: Mental Status - Impaired:  Goal: Mental status will be restored to baseline  Description: Mental status will be restored to baseline  Outcome: Not Met This Shift     Problem: Nutrition Deficit:  Goal: Ability to achieve adequate nutritional intake will improve  Description: Ability to achieve adequate nutritional intake will improve  Outcome: Ongoing     Problem: Pain:  Goal: Pain level will decrease  Description: Pain level will decrease  Outcome: Met This Shift  Goal: Recognizes and communicates pain  Description: Recognizes and communicates pain  Outcome: Ongoing  Goal: Control of acute pain  Description: Control of acute pain  Outcome: Ongoing  Goal: Control of chronic pain  Description: Control of chronic pain  Outcome: Ongoing     Problem: Serum Glucose Level - Abnormal:  Goal: Ability to maintain appropriate glucose levels will improve to within specified parameters  Description: Ability to maintain appropriate glucose levels will improve to within specified parameters  Outcome: Met This Shift     Problem: Skin Integrity - Impaired:  Goal: Will show no infection signs and symptoms  Description: Will show no infection signs and symptoms  Outcome: Not Met This Shift  Goal: Absence of new skin breakdown  Description: Absence of new skin breakdown  Outcome: Ongoing     Problem: Sleep Pattern Disturbance:  Goal: Appears well-rested  Description: Appears well-rested  Outcome: Not Met This Shift     Problem: Tissue Perfusion, Altered:  Goal: Circulatory function within specified parameters  Description: Circulatory function within specified parameters  Outcome: Ongoing     Problem: Tissue Perfusion - Cardiopulmonary, Altered:  Goal: Absence of angina  Description: Absence of angina  Outcome: Met This Shift  Goal: Hemodynamic stability will improve  Description: Hemodynamic stability will improve  Outcome: Ongoing     Problem: Skin Integrity:  Goal: Will show no infection signs and symptoms  Description: Will show no infection signs and symptoms  Outcome: Not Met This Shift  Goal: Absence of new skin breakdown  Description: Absence of new skin breakdown  Outcome: Ongoing     Problem: Falls - Risk of:  Goal: Will remain free from falls  Description: Will remain free from falls  Outcome: Met This Shift  Goal: Absence of physical injury  Description: Absence of physical injury  Outcome: Met This Shift

## 2022-03-16 NOTE — PROGRESS NOTES
Subjective:    Remains intubated  in ICU setting  Had run of atrial fibrillation with markedly elevated rates requiring DCCV at bedside        Objective:    BP (!) 92/48   Pulse 122   Temp 101.1 °F (38.4 °C) (Bladder)   Resp 24   Ht 5' 4\" (1.626 m)   Wt (!) 330 lb 3.2 oz (149.8 kg)   LMP  (LMP Unknown)   SpO2 94%   BMI 56.68 kg/m²     In: 1986.4 [I.V.:1252.4; NG/GT:734]  Out: 403   In: 1986.4   Out: 403 [Urine:403]    General appearance intubated awake, poor hygiene malodorous  HEENT: AT/NC, MMM  Neck: FROM, supple  Lungs: Clear to auscultation  CV: RRR, no MRGs  Vasc: Radial pulses 2+  Abdomen: Soft, non-tender; no masses or HSM  Extremities: Ichthyosis bilateral lower extremities-bilateral lower extremities in dressing  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  *      Plan:    67-year-old woman with multiple comorbidities admitted to ICU setting for septic shock on triple pressor support, broad-spectrum IV antibiotic therapy, intubated sedated     IV fluid resuscitation for marked lactic acidosis-resolved  Vent wean as able per critical care team  Off pressor support, start midodrine if needed  on Solu-Cortef 3 times daily-wean now the blood pressure is improved-contributing to marked elevation in blood sugars  -blood cultures with alpha hemolytic strep  Broad-spectrum IV antibiotics with infectious disease following-Merrem has been transitioned to Rocephin 3/9/2021-beta strep group G/ linezolid added-white count resolved  ruq us -hepatomegaly with diffuse fatty infiltration/gallstones and sludge  Amiodarone drip discontinued shortly after admission  Glargine, insulin sliding scale for blood sugar management, consider insulin drip in critically ill woman, blood sugars spike up to 400 range again  Monitor renal function for possible prerenal failure given hypotension-  No evidence of diabetic ketoacidosis-continue to monitor, blood sugars adequately controlled  Initiation of Precedex, fentanyl and Versed for sedation/agitation  Amiodarone being resumed now secondary to marked elevation and heart rate status post cardioversion for hemodynamic instability    Daily labs     DVT Prophylaxis   PT/OT  Discharge [address], [place]  7:13 PM  3/16/2022

## 2022-03-16 NOTE — PROGRESS NOTES
[address] [place] [address] [facility]  DARLYNROSALIND  Progress Note    SUBJECTIVE:  Chief Complaint   Patient presents with    Cough     started a couple days ago    Hyperglycemia      per EMS    Chills    Nausea     Zofran given per EMS     The patient is still in the ICU. It is still very difficult to wean her off sedation. She still becomes very agitation and cannot be weaned. She is having low-grade fevers. Review of systems:  A 10 point review of systems was done. As stated above, otherwise negative.     Medications:   ipratropium-albuterol  1 ampule Inhalation Q6H WA    Arformoterol Tartrate  15 mcg Nebulization BID    budesonide  0.5 mg Nebulization BID    oxyCODONE  5 mg Oral Q6H    insulin glargine  30 Units SubCUTAneous BID    lidocaine PF  5 mL IntraDERmal Once    sodium chloride flush  5-40 mL IntraVENous 2 times per day    heparin flush  3 mL IntraVENous 2 times per day    QUEtiapine  50 mg Oral BID    hydrocortisone sodium succinate PF  25 mg IntraVENous Q8H    metoprolol tartrate  12.5 mg Oral BID    linezolid  600 mg Per NG tube 2 times per day    mupirocin   Nasal BID    insulin lispro  0-18 Units SubCUTAneous Q4H    docusate  100 mg Per NG tube BID    senna  10 mL Per NG tube BID    enoxaparin  1 mg/kg SubCUTAneous BID    cefTRIAXone (ROCEPHIN) IV  2,000 mg IntraVENous Q24H    chlorhexidine  15 mL Mouth/Throat BID    pantoprazole  40 mg IntraVENous Daily    miconazole   Topical BID      sodium chloride      fentaNYL 5 mcg/ml in 0.9%  ml infusion 200 mcg/hr (03/16/22 0610)    dexmedetomidine (PRECEDEX) IV infusion 1.5 mcg/kg/hr (03/16/22 0729)    dextrose      midazolam 5 mg/hr (03/16/22 0610)    sodium chloride 10 mL/hr at 03/16/22 0610     ipratropium-albuterol, sodium chloride flush, sodium chloride, heparin flush, white petrolatum, fentanNYL, dextrose bolus (hypoglycemia) **OR** dextrose bolus (hypoglycemia), glucose, glucagon (rDNA), dextrose, midazolam, acetaminophen, acetaminophen    OBJECTIVE:  BP (!) 209/84   Pulse 91   Temp 100.9 °F (38.3 °C) (Bladder)   Resp 29   Ht 5' 4\" (1.626 m)   Wt (!) 330 lb 3.2 oz (149.8 kg)   LMP  (LMP Unknown)   SpO2 94%   BMI 56.68 kg/m²   Temp  Av.1 °F (38.4 °C)  Min: 100.9 °F (38.3 °C)  Max: 101.5 °F (38.6 °C)     Constitutional: The patient is lying even in the ICU. She is intubated and sedated. Restless. She is restrained opens eyes. FiO2 30%. PEEP 8. No changes. Skin: Warm and dry. No rashes were noted. HEENT: Eyes show round, and reactive pupils. No jaundice. Moist mucous membranes, no ulcerations, no thrush. ETT. NGT  Neck: Supple to movements. No lymphadenopathy. Chest: No respiratory distress. No crackles. Cardiovascular: Heart sounds rhythmic and regular. Abdomen: Positive bowel heart sounds rhythmic and regular. Sounds to auscultation. Benign to palpation. Large and pendulous. Intertrigo improved. Extremities: Both legs are wrapped in Ace. Lines: Right radial arterial line 3/8/2022. Left IJ TLC 3/8/2022.     Laboratory and Tests Review:  Lab Results   Component Value Date    WBC 9.1 2022    WBC 9.9 03/15/2022    WBC 8.8 2022    HGB 8.0 (L) 2022    HCT 27.7 (L) 2022    MCV 85.5 2022     2022     Lab Results   Component Value Date    NEUTROABS 5.99 03/15/2022    NEUTROABS 6.51 2022    NEUTROABS 6.81 2022     Lab Results   Component Value Date    CRP 42.1 (H) 2022    CRP 13.7 (H) 2022    CRP 5.3 (H) 2021     No results found for: CRPHS  Lab Results   Component Value Date    SEDRATE 98 (H) 2022    SEDRATE 30 (H) 2022    SEDRATE 41 (H) 2021     Lab Results   Component Value Date    ALT 83 (H) 2022    AST 22 2022    ALKPHOS 130 (H) 2022    BILITOT 0.3 2022     Lab Results   Component Value Date     2022    K 4.0 2022    K 5.7 2022     03/16/2022    CO2 32 03/16/2022    BUN 20 03/16/2022    CREATININE 0.8 03/16/2022    CREATININE 0.8 03/15/2022    CREATININE 0.7 03/14/2022    GFRAA >60 03/16/2022    LABGLOM >60 03/16/2022    GLUCOSE 168 03/16/2022    GLUCOSE 181 12/16/2011    PROT 6.5 03/16/2022    LABALBU 2.8 03/16/2022    LABALBU 4.5 11/02/2011    CALCIUM 8.2 03/16/2022    BILITOT 0.3 03/16/2022    ALKPHOS 130 03/16/2022    AST 22 03/16/2022    ALT 83 03/16/2022     Radiology:  Reviewed    Microbiology:   Rapid SARS-CoV-2: Negative  Respiratory panel: Negative  Blood cultures 3/8/2022: Group G Streptococcus in 4 of 4 bottles  Blood cultures 3/9/2022: Negative x2  Leg wound culture 3/8/2022: Proteus, MRSA, CoNS nonhemolytic Streptococcus  Nares screen MRSA: Positive    ASSESSMENT:  · Cellulitis right lower extremity. Wound cultures grew mixed organisms, including nonhemolytic Streptococcus, Proteus, MRSA and CoNS  · Group G Streptococcus septicemia probably associated to cellulitis. This has resolved. Repeat blood cultures were negative  · Possible intra-abdominal infection. Possible cholecystitis  · Sepsis with septic shock, improved  · Acute respiratory failure   · Leukocytosis, resolved  · Elevation of transaminases. Possible cholecystitis. They seem to be trending downward. Ultrasound shows gallstones and edematous gallbladder wall  · Probable drug-induced fever. She has a pattern of nonremitting fever. She is only had 1 normal temperature in the last few days    PLAN:  · Stop Ceftriaxone and monitor temperatures  · Continue Linezolid, day 4  · We will follow with you  · Consider cholecystostomy tube or surgical consultation    Spoke with nursing.      Demetris Bell MD  9:12 AM  3/16/2022

## 2022-03-17 ENCOUNTER — APPOINTMENT (OUTPATIENT)
Dept: GENERAL RADIOLOGY | Age: 67
DRG: 870 | End: 2022-03-17
Payer: MEDICARE

## 2022-03-17 LAB
ALBUMIN SERPL-MCNC: 2.7 G/DL (ref 3.5–5.2)
ALP BLD-CCNC: 112 U/L (ref 35–104)
ALT SERPL-CCNC: 55 U/L (ref 0–32)
ANION GAP SERPL CALCULATED.3IONS-SCNC: 7 MMOL/L (ref 7–16)
ANISOCYTOSIS: ABNORMAL
AST SERPL-CCNC: 17 U/L (ref 0–31)
B.E.: 8.2 MMOL/L (ref -3–3)
B.E.: 9.3 MMOL/L (ref -3–3)
BASOPHILS ABSOLUTE: 0 E9/L (ref 0–0.2)
BASOPHILS RELATIVE PERCENT: 0 % (ref 0–2)
BILIRUB SERPL-MCNC: 0.3 MG/DL (ref 0–1.2)
BUN BLDV-MCNC: 23 MG/DL (ref 6–23)
CALCIUM SERPL-MCNC: 7.7 MG/DL (ref 8.6–10.2)
CHLORIDE BLD-SCNC: 104 MMOL/L (ref 98–107)
CO2: 34 MMOL/L (ref 22–29)
COHB: 1.2 % (ref 0–1.5)
COHB: 1.3 % (ref 0–1.5)
CREAT SERPL-MCNC: 1 MG/DL (ref 0.5–1)
CRITICAL: ABNORMAL
CRITICAL: ABNORMAL
DATE ANALYZED: ABNORMAL
DATE ANALYZED: ABNORMAL
DATE OF COLLECTION: ABNORMAL
DATE OF COLLECTION: ABNORMAL
EKG ATRIAL RATE: 135 BPM
EKG ATRIAL RATE: 76 BPM
EKG P AXIS: 59 DEGREES
EKG P-R INTERVAL: 136 MS
EKG Q-T INTERVAL: 274 MS
EKG Q-T INTERVAL: 448 MS
EKG QRS DURATION: 104 MS
EKG QRS DURATION: 98 MS
EKG QTC CALCULATION (BAZETT): 468 MS
EKG QTC CALCULATION (BAZETT): 504 MS
EKG R AXIS: 141 DEGREES
EKG R AXIS: 54 DEGREES
EKG T AXIS: -173 DEGREES
EKG T AXIS: 46 DEGREES
EKG VENTRICULAR RATE: 176 BPM
EKG VENTRICULAR RATE: 76 BPM
EOSINOPHILS ABSOLUTE: 0 E9/L (ref 0.05–0.5)
EOSINOPHILS RELATIVE PERCENT: 0 % (ref 0–6)
FIO2: 30 %
FIO2: 30 %
GFR AFRICAN AMERICAN: >60
GFR NON-AFRICAN AMERICAN: 55 ML/MIN/1.73
GLUCOSE BLD-MCNC: 194 MG/DL (ref 74–99)
HCO3: 32.8 MMOL/L (ref 22–26)
HCO3: 33.3 MMOL/L (ref 22–26)
HCT VFR BLD CALC: 27.3 % (ref 34–48)
HEMOGLOBIN: 7.9 G/DL (ref 11.5–15.5)
HHB: 7.1 % (ref 0–5)
HHB: 7.8 % (ref 0–5)
LAB: ABNORMAL
LAB: ABNORMAL
LACTIC ACID: 1 MMOL/L (ref 0.5–2.2)
LYMPHOCYTES ABSOLUTE: 0.63 E9/L (ref 1.5–4)
LYMPHOCYTES RELATIVE PERCENT: 7.8 % (ref 20–42)
Lab: ABNORMAL
Lab: ABNORMAL
MAGNESIUM: 1.9 MG/DL (ref 1.6–2.6)
MCH RBC QN AUTO: 24.8 PG (ref 26–35)
MCHC RBC AUTO-ENTMCNC: 28.9 % (ref 32–34.5)
MCV RBC AUTO: 85.6 FL (ref 80–99.9)
METER GLUCOSE: 170 MG/DL (ref 74–99)
METER GLUCOSE: 174 MG/DL (ref 74–99)
METER GLUCOSE: 183 MG/DL (ref 74–99)
METER GLUCOSE: 197 MG/DL (ref 74–99)
METER GLUCOSE: 199 MG/DL (ref 74–99)
METER GLUCOSE: 232 MG/DL (ref 74–99)
METHB: 0.3 % (ref 0–1.5)
METHB: 0.3 % (ref 0–1.5)
MODE: ABNORMAL
MODE: ABNORMAL
MONOCYTES ABSOLUTE: 0.24 E9/L (ref 0.1–0.95)
MONOCYTES RELATIVE PERCENT: 2.6 % (ref 2–12)
MYELOCYTE PERCENT: 0.9 % (ref 0–0)
NEUTROPHILS ABSOLUTE: 7.11 E9/L (ref 1.8–7.3)
NEUTROPHILS RELATIVE PERCENT: 88.7 % (ref 43–80)
NUCLEATED RED BLOOD CELLS: 0 /100 WBC
O2 CONTENT: 11.5 ML/DL
O2 CONTENT: 11.6 ML/DL
O2 SATURATION: 92.1 % (ref 92–98.5)
O2 SATURATION: 92.8 % (ref 92–98.5)
O2HB: 90.6 % (ref 94–97)
O2HB: 91.4 % (ref 94–97)
OPERATOR ID: 166
OPERATOR ID: ABNORMAL
PATIENT TEMP: 37 C
PATIENT TEMP: 37 C
PCO2: 43.6 MMHG (ref 35–45)
PCO2: 46.6 MMHG (ref 35–45)
PDW BLD-RTO: 16.3 FL (ref 11.5–15)
PEEP/CPAP: 5 CMH2O
PEEP/CPAP: 5 CMH2O
PFO2: 2.15 MMHG/%
PFO2: 2.24 MMHG/%
PH BLOOD GAS: 7.47 (ref 7.35–7.45)
PH BLOOD GAS: 7.5 (ref 7.35–7.45)
PHOSPHORUS: 4.6 MG/DL (ref 2.5–4.5)
PIP: 30 CMH2O
PLATELET # BLD: 263 E9/L (ref 130–450)
PMV BLD AUTO: 10.8 FL (ref 7–12)
PO2: 64.6 MMHG (ref 75–100)
PO2: 67.3 MMHG (ref 75–100)
POLYCHROMASIA: ABNORMAL
POTASSIUM SERPL-SCNC: 4.5 MMOL/L (ref 3.5–5)
RBC # BLD: 3.19 E12/L (ref 3.5–5.5)
RR MECHANICAL: 14 B/MIN
SODIUM BLD-SCNC: 145 MMOL/L (ref 132–146)
SOURCE, BLOOD GAS: ABNORMAL
SOURCE, BLOOD GAS: ABNORMAL
THB: 9 G/DL (ref 11.5–16.5)
THB: 9 G/DL (ref 11.5–16.5)
TIME ANALYZED: 1306
TIME ANALYZED: 530
TOTAL PROTEIN: 6.2 G/DL (ref 6.4–8.3)
TROPONIN, HIGH SENSITIVITY: 135 NG/L (ref 0–9)
VT MECHANICAL: 400 ML
WBC # BLD: 7.9 E9/L (ref 4.5–11.5)

## 2022-03-17 PROCEDURE — 2500000003 HC RX 250 WO HCPCS: Performed by: INTERNAL MEDICINE

## 2022-03-17 PROCEDURE — 6360000002 HC RX W HCPCS: Performed by: INTERNAL MEDICINE

## 2022-03-17 PROCEDURE — 6370000000 HC RX 637 (ALT 250 FOR IP): Performed by: INTERNAL MEDICINE

## 2022-03-17 PROCEDURE — 6370000000 HC RX 637 (ALT 250 FOR IP): Performed by: STUDENT IN AN ORGANIZED HEALTH CARE EDUCATION/TRAINING PROGRAM

## 2022-03-17 PROCEDURE — 2500000003 HC RX 250 WO HCPCS

## 2022-03-17 PROCEDURE — 93010 ELECTROCARDIOGRAM REPORT: CPT | Performed by: INTERNAL MEDICINE

## 2022-03-17 PROCEDURE — 2000000000 HC ICU R&B

## 2022-03-17 PROCEDURE — 2580000003 HC RX 258: Performed by: INTERNAL MEDICINE

## 2022-03-17 PROCEDURE — 80053 COMPREHEN METABOLIC PANEL: CPT

## 2022-03-17 PROCEDURE — 85025 COMPLETE CBC W/AUTO DIFF WBC: CPT

## 2022-03-17 PROCEDURE — 94003 VENT MGMT INPAT SUBQ DAY: CPT

## 2022-03-17 PROCEDURE — 6360000002 HC RX W HCPCS: Performed by: STUDENT IN AN ORGANIZED HEALTH CARE EDUCATION/TRAINING PROGRAM

## 2022-03-17 PROCEDURE — 2580000003 HC RX 258: Performed by: STUDENT IN AN ORGANIZED HEALTH CARE EDUCATION/TRAINING PROGRAM

## 2022-03-17 PROCEDURE — 83735 ASSAY OF MAGNESIUM: CPT

## 2022-03-17 PROCEDURE — 2580000003 HC RX 258

## 2022-03-17 PROCEDURE — 71045 X-RAY EXAM CHEST 1 VIEW: CPT

## 2022-03-17 PROCEDURE — 36415 COLL VENOUS BLD VENIPUNCTURE: CPT

## 2022-03-17 PROCEDURE — 36592 COLLECT BLOOD FROM PICC: CPT

## 2022-03-17 PROCEDURE — 94660 CPAP INITIATION&MGMT: CPT

## 2022-03-17 PROCEDURE — C9113 INJ PANTOPRAZOLE SODIUM, VIA: HCPCS | Performed by: INTERNAL MEDICINE

## 2022-03-17 PROCEDURE — 83605 ASSAY OF LACTIC ACID: CPT

## 2022-03-17 PROCEDURE — 82962 GLUCOSE BLOOD TEST: CPT

## 2022-03-17 PROCEDURE — 84100 ASSAY OF PHOSPHORUS: CPT

## 2022-03-17 PROCEDURE — 93005 ELECTROCARDIOGRAM TRACING: CPT | Performed by: INTERNAL MEDICINE

## 2022-03-17 PROCEDURE — 36600 WITHDRAWAL OF ARTERIAL BLOOD: CPT

## 2022-03-17 PROCEDURE — 94640 AIRWAY INHALATION TREATMENT: CPT

## 2022-03-17 PROCEDURE — 99223 1ST HOSP IP/OBS HIGH 75: CPT | Performed by: INTERNAL MEDICINE

## 2022-03-17 PROCEDURE — 82805 BLOOD GASES W/O2 SATURATION: CPT

## 2022-03-17 PROCEDURE — 6370000000 HC RX 637 (ALT 250 FOR IP)

## 2022-03-17 PROCEDURE — 84484 ASSAY OF TROPONIN QUANT: CPT

## 2022-03-17 RX ORDER — METOPROLOL TARTRATE 50 MG/1
50 TABLET, FILM COATED ORAL 2 TIMES DAILY
Status: DISCONTINUED | OUTPATIENT
Start: 2022-03-17 | End: 2022-03-18

## 2022-03-17 RX ORDER — MAGNESIUM SULFATE IN WATER 40 MG/ML
2000 INJECTION, SOLUTION INTRAVENOUS ONCE
Status: COMPLETED | OUTPATIENT
Start: 2022-03-17 | End: 2022-03-17

## 2022-03-17 RX ADMIN — Medication 8 MG/HR: at 05:07

## 2022-03-17 RX ADMIN — BUDESONIDE 500 MCG: 0.5 SUSPENSION RESPIRATORY (INHALATION) at 19:50

## 2022-03-17 RX ADMIN — DEXMEDETOMIDINE HYDROCHLORIDE 0.8 MCG/KG/HR: 100 INJECTION, SOLUTION INTRAVENOUS at 23:14

## 2022-03-17 RX ADMIN — IPRATROPIUM BROMIDE AND ALBUTEROL SULFATE 1 AMPULE: 2.5; .5 SOLUTION RESPIRATORY (INHALATION) at 07:48

## 2022-03-17 RX ADMIN — DEXMEDETOMIDINE HYDROCHLORIDE 0.3 MCG/KG/HR: 100 INJECTION, SOLUTION INTRAVENOUS at 18:57

## 2022-03-17 RX ADMIN — INSULIN LISPRO 3 UNITS: 100 INJECTION, SOLUTION INTRAVENOUS; SUBCUTANEOUS at 12:46

## 2022-03-17 RX ADMIN — METOPROLOL TARTRATE 25 MG: 25 TABLET, FILM COATED ORAL at 08:07

## 2022-03-17 RX ADMIN — CHLORHEXIDINE GLUCONATE 0.12% ORAL RINSE 15 ML: 1.2 LIQUID ORAL at 20:01

## 2022-03-17 RX ADMIN — IPRATROPIUM BROMIDE AND ALBUTEROL SULFATE 1 AMPULE: 2.5; .5 SOLUTION RESPIRATORY (INHALATION) at 13:20

## 2022-03-17 RX ADMIN — OXYCODONE HYDROCHLORIDE 10 MG: 5 SOLUTION ORAL at 08:06

## 2022-03-17 RX ADMIN — HYDROCORTISONE SODIUM SUCCINATE 25 MG: 100 INJECTION, POWDER, FOR SOLUTION INTRAMUSCULAR; INTRAVENOUS at 14:17

## 2022-03-17 RX ADMIN — DEXMEDETOMIDINE HYDROCHLORIDE 1.2 MCG/KG/HR: 100 INJECTION, SOLUTION INTRAVENOUS at 08:30

## 2022-03-17 RX ADMIN — MUPIROCIN: 20 OINTMENT TOPICAL at 20:01

## 2022-03-17 RX ADMIN — QUETIAPINE FUMARATE 50 MG: 25 TABLET ORAL at 08:06

## 2022-03-17 RX ADMIN — MAGNESIUM SULFATE HEPTAHYDRATE 2000 MG: 40 INJECTION, SOLUTION INTRAVENOUS at 09:24

## 2022-03-17 RX ADMIN — BUMETANIDE 1 MG: 0.25 INJECTION INTRAMUSCULAR; INTRAVENOUS at 20:01

## 2022-03-17 RX ADMIN — DIAZEPAM 5 MG: 5 TABLET ORAL at 20:00

## 2022-03-17 RX ADMIN — QUETIAPINE FUMARATE 50 MG: 25 TABLET ORAL at 20:33

## 2022-03-17 RX ADMIN — MICONAZOLE NITRATE: 20 POWDER TOPICAL at 08:31

## 2022-03-17 RX ADMIN — ARFORMOTEROL TARTRATE 15 MCG: 15 SOLUTION RESPIRATORY (INHALATION) at 19:50

## 2022-03-17 RX ADMIN — SENNOSIDES 17.6 MG: 8.8 SYRUP ORAL at 08:08

## 2022-03-17 RX ADMIN — DEXTROSE 0.5 MG/MIN: 5 SOLUTION INTRAVENOUS at 01:33

## 2022-03-17 RX ADMIN — SODIUM CHLORIDE, PRESERVATIVE FREE 10 ML: 5 INJECTION INTRAVENOUS at 21:40

## 2022-03-17 RX ADMIN — DEXMEDETOMIDINE HYDROCHLORIDE 1.5 MCG/KG/HR: 100 INJECTION, SOLUTION INTRAVENOUS at 12:46

## 2022-03-17 RX ADMIN — FENTANYL CITRATE 25 MCG: 50 INJECTION INTRAMUSCULAR; INTRAVENOUS at 19:59

## 2022-03-17 RX ADMIN — LINEZOLID 600 MG: 100 SUSPENSION ORAL at 08:08

## 2022-03-17 RX ADMIN — ENOXAPARIN SODIUM 135 MG: 150 INJECTION SUBCUTANEOUS at 20:32

## 2022-03-17 RX ADMIN — SODIUM CHLORIDE, PRESERVATIVE FREE 10 ML: 5 INJECTION INTRAVENOUS at 08:07

## 2022-03-17 RX ADMIN — DOCUSATE SODIUM LIQUID 100 MG: 100 LIQUID ORAL at 08:06

## 2022-03-17 RX ADMIN — MICONAZOLE NITRATE: 20 POWDER TOPICAL at 21:40

## 2022-03-17 RX ADMIN — DEXMEDETOMIDINE HYDROCHLORIDE 1 MCG/KG/HR: 100 INJECTION, SOLUTION INTRAVENOUS at 03:03

## 2022-03-17 RX ADMIN — SENNOSIDES 17.6 MG: 8.8 SYRUP ORAL at 20:01

## 2022-03-17 RX ADMIN — DEXMEDETOMIDINE HYDROCHLORIDE 1 MCG/KG/HR: 100 INJECTION, SOLUTION INTRAVENOUS at 05:54

## 2022-03-17 RX ADMIN — DEXMEDETOMIDINE HYDROCHLORIDE 1.2 MCG/KG/HR: 100 INJECTION, SOLUTION INTRAVENOUS at 00:39

## 2022-03-17 RX ADMIN — LINEZOLID 600 MG: 100 SUSPENSION ORAL at 20:01

## 2022-03-17 RX ADMIN — DEXMEDETOMIDINE HYDROCHLORIDE 1.5 MCG/KG/HR: 100 INJECTION, SOLUTION INTRAVENOUS at 10:56

## 2022-03-17 RX ADMIN — BUMETANIDE 1 MG: 0.25 INJECTION INTRAMUSCULAR; INTRAVENOUS at 08:35

## 2022-03-17 RX ADMIN — ACETAMINOPHEN 650 MG: 325 TABLET ORAL at 09:24

## 2022-03-17 RX ADMIN — INSULIN LISPRO 3 UNITS: 100 INJECTION, SOLUTION INTRAVENOUS; SUBCUTANEOUS at 08:06

## 2022-03-17 RX ADMIN — METOPROLOL TARTRATE 50 MG: 50 TABLET, FILM COATED ORAL at 20:32

## 2022-03-17 RX ADMIN — INSULIN LISPRO 3 UNITS: 100 INJECTION, SOLUTION INTRAVENOUS; SUBCUTANEOUS at 05:04

## 2022-03-17 RX ADMIN — OXYCODONE HYDROCHLORIDE 10 MG: 5 SOLUTION ORAL at 14:16

## 2022-03-17 RX ADMIN — INSULIN GLARGINE 30 UNITS: 100 INJECTION, SOLUTION SUBCUTANEOUS at 08:05

## 2022-03-17 RX ADMIN — Medication 200 MCG/HR: at 05:08

## 2022-03-17 RX ADMIN — CHLORHEXIDINE GLUCONATE 0.12% ORAL RINSE 15 ML: 1.2 LIQUID ORAL at 08:30

## 2022-03-17 RX ADMIN — NALOXEGOL OXALATE 25 MG: 12.5 TABLET, FILM COATED ORAL at 16:59

## 2022-03-17 RX ADMIN — BUDESONIDE 500 MCG: 0.5 SUSPENSION RESPIRATORY (INHALATION) at 07:49

## 2022-03-17 RX ADMIN — DIAZEPAM 5 MG: 5 TABLET ORAL at 12:40

## 2022-03-17 RX ADMIN — MUPIROCIN: 20 OINTMENT TOPICAL at 08:31

## 2022-03-17 RX ADMIN — INSULIN GLARGINE 30 UNITS: 100 INJECTION, SOLUTION SUBCUTANEOUS at 20:33

## 2022-03-17 RX ADMIN — PANTOPRAZOLE SODIUM 40 MG: 40 INJECTION, POWDER, FOR SOLUTION INTRAVENOUS at 08:07

## 2022-03-17 RX ADMIN — DEXMEDETOMIDINE HYDROCHLORIDE 0.9 MCG/KG/HR: 100 INJECTION, SOLUTION INTRAVENOUS at 14:59

## 2022-03-17 RX ADMIN — IPRATROPIUM BROMIDE AND ALBUTEROL SULFATE 1 AMPULE: 2.5; .5 SOLUTION RESPIRATORY (INHALATION) at 19:50

## 2022-03-17 RX ADMIN — OXYCODONE HYDROCHLORIDE 10 MG: 5 SOLUTION ORAL at 20:00

## 2022-03-17 RX ADMIN — INSULIN LISPRO 3 UNITS: 100 INJECTION, SOLUTION INTRAVENOUS; SUBCUTANEOUS at 16:52

## 2022-03-17 RX ADMIN — DEXTROSE 0.5 MG/MIN: 5 SOLUTION INTRAVENOUS at 18:03

## 2022-03-17 RX ADMIN — DOCUSATE SODIUM LIQUID 100 MG: 100 LIQUID ORAL at 20:32

## 2022-03-17 RX ADMIN — INSULIN LISPRO 3 UNITS: 100 INJECTION, SOLUTION INTRAVENOUS; SUBCUTANEOUS at 00:46

## 2022-03-17 RX ADMIN — ENOXAPARIN SODIUM 135 MG: 150 INJECTION SUBCUTANEOUS at 08:07

## 2022-03-17 RX ADMIN — HYDROCORTISONE SODIUM SUCCINATE 25 MG: 100 INJECTION, POWDER, FOR SOLUTION INTRAMUSCULAR; INTRAVENOUS at 20:32

## 2022-03-17 RX ADMIN — INSULIN LISPRO 6 UNITS: 100 INJECTION, SOLUTION INTRAVENOUS; SUBCUTANEOUS at 20:33

## 2022-03-17 RX ADMIN — HYDROCORTISONE SODIUM SUCCINATE 25 MG: 100 INJECTION, POWDER, FOR SOLUTION INTRAMUSCULAR; INTRAVENOUS at 05:53

## 2022-03-17 RX ADMIN — ARFORMOTEROL TARTRATE 15 MCG: 15 SOLUTION RESPIRATORY (INHALATION) at 07:49

## 2022-03-17 ASSESSMENT — PULMONARY FUNCTION TESTS
PIF_VALUE: 36
PIF_VALUE: 23
PIF_VALUE: 36
PIF_VALUE: 38
PIF_VALUE: 23
PIF_VALUE: 36

## 2022-03-17 ASSESSMENT — PAIN SCALES - GENERAL
PAINLEVEL_OUTOF10: 5
PAINLEVEL_OUTOF10: 4
PAINLEVEL_OUTOF10: 3
PAINLEVEL_OUTOF10: 0
PAINLEVEL_OUTOF10: 4

## 2022-03-17 ASSESSMENT — PAIN SCALES - WONG BAKER
WONGBAKER_NUMERICALRESPONSE: 6
WONGBAKER_NUMERICALRESPONSE: 6

## 2022-03-17 NOTE — PROGRESS NOTES
[address] [place] [address] [facility]  SUZANNE  Progress Note    SUBJECTIVE:  Chief Complaint   Patient presents with    Cough     started a couple days ago    Hyperglycemia      per EMS    Chills    Nausea     Zofran given per EMS     The patient is still having nonremitting fevers. She is still in the ICU. She remains intubated, sedated and requiring restraints. She is back on Precedex. Review of systems:  A 10 point review of systems was done. As stated above, otherwise negative.     Medications:   magnesium sulfate  2,000 mg IntraVENous Once    metoprolol tartrate  50 mg Oral BID    ipratropium-albuterol  1 ampule Inhalation Q6H WA    Arformoterol Tartrate  15 mcg Nebulization BID    budesonide  0.5 mg Nebulization BID    bumetanide  1 mg IntraVENous BID    diazePAM  5 mg Oral Q6H    oxyCODONE  10 mg Oral Q6H    insulin glargine  30 Units SubCUTAneous BID    lidocaine PF  5 mL IntraDERmal Once    sodium chloride flush  5-40 mL IntraVENous 2 times per day    heparin flush  3 mL IntraVENous 2 times per day    QUEtiapine  50 mg Oral BID    hydrocortisone sodium succinate PF  25 mg IntraVENous Q8H    linezolid  600 mg Per NG tube 2 times per day    mupirocin   Nasal BID    insulin lispro  0-18 Units SubCUTAneous Q4H    docusate  100 mg Per NG tube BID    senna  10 mL Per NG tube BID    enoxaparin  1 mg/kg SubCUTAneous BID    chlorhexidine  15 mL Mouth/Throat BID    pantoprazole  40 mg IntraVENous Daily    miconazole   Topical BID      amiodarone 0.5 mg/min (03/17/22 0903)    phenylephrine (DARLYN-SYNEPHRINE) 50mg/250mL infusion      dexmedetomidine (PRECEDEX) IV infusion 1.5 mcg/kg/hr (03/17/22 0945)    sodium chloride      fentaNYL 5 mcg/ml in 0.9%  ml infusion 150 mcg/hr (03/17/22 0901)    dextrose      midazolam 6 mg/hr (03/17/22 0907)    sodium chloride 10 mL/hr at 03/17/22 0903     ipratropium-albuterol, sodium chloride flush, sodium chloride, heparin flush, white petrolatum, fentanNYL, dextrose bolus (hypoglycemia) **OR** dextrose bolus (hypoglycemia), glucose, glucagon (rDNA), dextrose, midazolam, acetaminophen, acetaminophen    OBJECTIVE:  /66   Pulse 84   Temp 100.9 °F (38.3 °C) (Bladder)   Resp 24   Ht 5' 4\" (1.626 m)   Wt (!) 330 lb 3.2 oz (149.8 kg)   LMP  (LMP Unknown)   SpO2 (!) 89%   BMI 56.68 kg/m²   Temp  Av.7 °F (38.2 °C)  Min: 100.2 °F (37.9 °C)  Max: 101.8 °F (38.8 °C)   Constitutional: The patient is lying even in the ICU. She is intubated and sedated. Restless. She is restrained opens eyes. FiO2 30%. PEEP 8. No changes. Skin: Warm and dry. No rashes were noted. HEENT: Eyes show round, and reactive pupils. No jaundice. Moist mucous membranes, no ulcerations, no thrush. ETT. NGT  Neck: Supple to movements. No lymphadenopathy. Chest: No respiratory distress. No crackles. Cardiovascular: Heart sounds rhythmic and regular. Abdomen: Positive bowel heart sounds rhythmic and regular. Sounds to auscultation. Benign to palpation. Large and pendulous. Intertrigo improved. Extremities: Both legs are wrapped in Ace. Lines: Right radial arterial line 3/8/2022. Left IJ TLC 3/8/2022.     Laboratory and Tests Review:  Lab Results   Component Value Date    WBC 7.9 2022    WBC 9.1 2022    WBC 9.9 03/15/2022    HGB 7.9 (L) 2022    HCT 27.3 (L) 2022    MCV 85.6 2022     2022     Lab Results   Component Value Date    NEUTROABS 7.11 2022    NEUTROABS 6.55 2022    NEUTROABS 5.99 03/15/2022     Lab Results   Component Value Date    CRP 42.1 (H) 2022    CRP 13.7 (H) 2022    CRP 5.3 (H) 2021     No results found for: CRPHS  Lab Results   Component Value Date    SEDRATE 98 (H) 2022    SEDRATE 30 (H) 2022    SEDRATE 41 (H) 2021     Lab Results   Component Value Date    ALT 55 (H) 2022    AST 17 2022    ALKPHOS 112 (H) 2022 BILITOT 0.3 03/17/2022     Lab Results   Component Value Date     03/17/2022    K 4.5 03/17/2022    K 3.6 03/16/2022     03/17/2022    CO2 34 03/17/2022    BUN 23 03/17/2022    CREATININE 1.0 03/17/2022    CREATININE 0.9 03/16/2022    CREATININE 0.8 03/16/2022    GFRAA >60 03/17/2022    LABGLOM 55 03/17/2022    GLUCOSE 194 03/17/2022    GLUCOSE 181 12/16/2011    PROT 6.2 03/17/2022    LABALBU 2.7 03/17/2022    LABALBU 4.5 11/02/2011    CALCIUM 7.7 03/17/2022    BILITOT 0.3 03/17/2022    ALKPHOS 112 03/17/2022    AST 17 03/17/2022    ALT 55 03/17/2022     Radiology:  Reviewed    Microbiology:   Rapid SARS-CoV-2: Negative  Respiratory panel: Negative  Blood cultures 3/8/2022: Group G Streptococcus in 4 of 4 bottles  Blood cultures 3/9/2022: Negative x2  Leg wound culture 3/8/2022: Proteus, MRSA, CoNS nonhemolytic Streptococcus  Nares screen MRSA: Positive    ASSESSMENT:  · Cellulitis right lower extremity. Wound cultures grew mixed organisms, including nonhemolytic Streptococcus, Proteus, MRSA and CoNS  · Group G Streptococcus septicemia probably associated to cellulitis. This has resolved. Repeat blood cultures were negative  · Possible intra-abdominal infection. Possible cholecystitis  · Sepsis with septic shock, improved  · Acute respiratory failure   · Leukocytosis, resolved  · Elevation of transaminases. Possible cholecystitis. They seem to be trending downward. Ultrasound shows gallstones and edematous gallbladder wall  · Probable drug-induced fever. She has a pattern of nonremitting fever. She is only had 1 normal temperature in the last few days    PLAN:  · Continue Linezolid, day 5  · We will follow with you  · Consider cholecystostomy tube or surgical consultation. Not done yet    Spoke with nursing.      Byron Mcginnis MD  10:09 AM  3/17/2022

## 2022-03-17 NOTE — CONSULTS
Inpatient Cardiology Consultation      Reason for Consult:  CHF    Consulting Physician: Dr. Mamie Parr    Requesting Physician:  Dr. Georgie Pathak    Date of Consultation: 5/1/2021    History of Present Illness:   Ms. Debi Merino is a 77year old  female with an extensive past medical history including paroxysmal atrial fibrillation; chronic HFpEF; and chronic respiratory failure. She is followed at our practice by Dr. Nathalie Pyle and was admitted on April 9, 2021 due to acute on chronic heart failure and respiratory failure and recurrent lower extremity cellulitis; she was in SR during that admission. She was diuresed with subjective improvement and discharged to SNF on April 17. She returned to the emergency room on April 29 with worsening dyspnea and was reportedly confused according to the staff at her facility. She was found to have YEIMI (BUN 47, Cr 1.8) and lactic acidosis, with CT of the abdomen and pelvis showing right lung base atelectasis without pleural effusions, and perinephric stranding and increasing size of known adrenal mass. Presenting EKG showed atrial fibrillation with rapid rate and she was placed on diltiazem drip and was given IV antibiotics. Since admission, blood cultures have returned positive for gram negative rods, and infectious disease has been consulted. She remains in AF with ventricular rates in the 120s to 140s, and is now receiving diltiazem at 10 mg/hour, as well as her maintenance oral diltiazem and metoprolol She is a difficult historian, but reports feeling more short of breath over the past few days, but cannot say how much activity she can tolerate or if the dyspnea occurred at rest. She denies cough, fevers, chills, or sweats and has had no palpitations. She complains of being tired and falls asleep frequently during the exam.     Past Medical and Surgical History:    1. Morbid Obesity   2. 37 pack years quit in 2004  3.  LLE DVT in remote past and was placed on coumadin for a Patient c/o nasal congestion since last Tuesday, body aches and chills since last Wednesday. Took at home covid test this AM, was positive, she talked to her PMD and advised her to receive antibody treatment. Patient has hx of asthma. Patient is fully vaccinated. period of time  4. HTN  5. HLD: on Pravachol  6. Lipitor myalgias   7. Chronic iron deficiency anemia   8. GERD  9. T2DM insulin neuropathy. 10. Anxiety/Depression  11. Adenosine Stress test 8/2008: No stress induced ischemia, EF 54%  12. PAF: Not on Coumadin d/t Hx of GIB (2004) and chronic anemia. [id number] score at least 5: (DM, TIA, Female, HTN)--> Eliquis initiated in 2019 per patient without further anemia or endoscopic procedures  13. Questionable TIA (5/2015) with questionable PFO:  14. TTE with bubble study: 6/2/2015:  Mild asymmetric septal hypertrophy, Normal left ventricle size and systolic function, Stage II diastolic dysfunction, Patent foramen ovale with right-to-left shunt with valsalva only was visualized, Trace mitral regurgitation, RVSP is 37 mmHg. 13. Per Dr. Za Cruz: (5/22/2015-office visit note)-\"I personally viewed the echo images myself now.  The images were poor & the atrial septum was not well seen. Landen De La Rosa is not a definite ASD.  A bubble study was performed & was poor quality but the few bubbles that were there did not cross the atrial septum.  The septum is probably aneurysmal & & thus may have a PFO but again the visualization was too poor.  She states that Neuro was still not anum that it was a TIA. \"-Dr. Za Cruz recommended a repeat JIMENA or cardiac MRI-patient reported \"I didn't want the testing\". 16. HFpEF  17. Admitted 01/15/18 with progressive dyspnea troponin normal [id number] hest xray showed b/l effusion, BNP= 814 and respiratory acidosis on ABG x2. She was given DubNebs x3, Solumedrol and placed on BiPAP. CBC showed an anemic Hemoglobin = 8.2. IV diuresed ~2.5 L. 18. TTE 1/16/2018, Dr. Uri Lacy concentric left ventricular hypertrophy. EF visually estimated at 55-65%. Stage II diastolic dysfunction. The left atrium is severely dilated. Elevated L atrial pressure. Mild mitral regurgitation. The aortic valve appears mildly sclerotic.  Pulmonary hypertension is mild to moderate. No evidence of pericardial effusion. 19. TTE 2021, Dr. Franklin Hams. Normal left ventricle size and systolic function. Ejection fraction is visually estimated at 60-65%. No regional wall motion abnormalities seen. Mild concentric left ventricular hypertrophy. Indeterminate diastolic function. The left atrium is mildly dilated. Moderately dilated right ventricle. Right ventricle global systolic function is normal . TAPSE 32            mm.  Physiologic and/or trace mitral regurgitation is present. No hemodynamically significant             aortic stenosis is present. Mild tricuspid regurgitation. RVSP is 69 mmHg. Pulmonary      hypertension is moderate. No evidence for hemodynamically significant pericardial effusion. Compared to prior echo of , changes noted. Now, there is moderate  pulmonary hypertension      with moderately dilated RV.  20. Untreated moderate SAM, previous AHI 23 , sleep study at [facility] 2018. She was refused repeat evaluation   21. Chronic respiratory failure chronic home O2 (2-4L NC): follows with Dr. Ishaan Bills. 22. Chronic lymphedema and recurrent left lower extremity cellulitis   23. History of anal fissure s/p surgery at [facility], appendectomy, tonsillectomy, colonoscopy s./p polypectomy, L TKA, RLE fracture. EGD  showing gastritis,   24. History of occipital neuralgia   25. Gait instability: uses a walker.       Medications Prior to admit:  Prior to Admission medications    Medication Sig Start Date End Date Taking?  Authorizing Provider   dilTIAZem (CARDIZEM CD) 240 MG extended release capsule Take 240 mg by mouth daily   Yes Historical Provider, MD   insulin lispro, 1 Unit Dial, (HUMALOG KWIKPEN) 100 UNIT/ML SOPN Inject 35 Units into the skin 3 times daily *Plus Sliding Scale*   Yes Historical Provider, MD   ipratropium (ATROVENT) 0.02 % nebulizer solution Take 0.5 mg by nebulization 4 times daily   Yes Historical Provider, MD   insulin glargine (LANTUS SOLOSTAR) 100 UNIT/ML injection pen Inject 45 Units into the skin 2 times daily   Yes Historical Provider, MD   miconazole (MICOTIN) 2 % powder Apply topically 2 times daily Apply to breasts and abdominal folds   Yes Historical Provider, MD   mineral oil-hydrophilic petrolatum (AQUAPHOR) ointment Apply topically 3 times daily as needed for Dry Skin   Yes Historical Provider, MD   promethazine (PHENERGAN) 25 MG tablet Take 25 mg by mouth every 4 hours as needed for Nausea   Yes Historical Provider, MD   acetaminophen (TYLENOL) 500 MG tablet Take 1,000 mg by mouth every 6 hours as needed for Pain   Yes Historical Provider, MD   albuterol sulfate HFA (VENTOLIN HFA) 108 (90 Base) MCG/ACT inhaler Inhale 2 puffs into the lungs every 6 hours as needed for Wheezing   Yes Historical Provider, MD   calcium carbonate (TUMS) 500 MG chewable tablet Take 1 tablet by mouth 3 times daily as needed for Heartburn 4/16/21 5/16/21 Yes Angleia Varela MD   terbinafine (LAMISIL) 250 MG tablet Take 1 tablet by mouth daily 4/16/21 5/16/21 Yes RAUL Wood - CNP   escitalopram (LEXAPRO) 20 MG tablet Take 1 tablet by mouth daily 11/27/20  Yes Jan N Blair, DO   miconazole nitrate 2 % OINT Apply topically 3 times daily as needed (after toileting) 11/26/20  Yes Elo Bingham, DO   apixaban (ELIQUIS) 5 MG TABS tablet Take 5 mg by mouth 2 times daily   Yes Historical Provider, MD   bumetanide (BUMEX) 2 MG tablet Take 2 mg by mouth 2 times daily   Yes Historical Provider, MD   gabapentin (NEURONTIN) 400 MG capsule Take 400 mg by mouth 3 times daily.     Yes Historical Provider, MD   lansoprazole (PREVACID) 30 MG delayed release capsule Take 30 mg by mouth 2 times daily   Yes Historical Provider, MD   traZODone (DESYREL) 50 MG tablet Take 50 mg by mouth nightly   Yes Historical Provider, MD   vitamin D (ERGOCALCIFEROL) 1.25 MG (76375 UT) CAPS capsule Take 50,000 Units by mouth once a week Given Monday   Yes Historical (MICOTIN) 2 % powder, , Topical, BID  montelukast (SINGULAIR) tablet 10 mg, 10 mg, Oral, Nightly  traZODone (DESYREL) tablet 50 mg, 50 mg, Oral, Nightly  glucose (GLUTOSE) 40 % oral gel 15 g, 15 g, Oral, PRN  dextrose 50 % IV solution, 12.5 g, Intravenous, PRN  glucagon (rDNA) injection 1 mg, 1 mg, Intramuscular, PRN  dextrose 5 % solution, 100 mL/hr, Intravenous, PRN  0.9 % sodium chloride infusion, , Intravenous, Continuous  sodium chloride flush 0.9 % injection 5-40 mL, 5-40 mL, Intravenous, 2 times per day  sodium chloride flush 0.9 % injection 5-40 mL, 5-40 mL, Intravenous, PRN  0.9 % sodium chloride infusion, 25 mL, Intravenous, PRN  acetaminophen (TYLENOL) tablet 650 mg, 650 mg, Oral, Q6H PRN **OR** acetaminophen (TYLENOL) suppository 650 mg, 650 mg, Rectal, Q6H PRN  magnesium hydroxide (MILK OF MAGNESIA) 400 MG/5ML suspension 30 mL, 30 mL, Oral, Daily PRN  promethazine (PHENERGAN) tablet 12.5 mg, 12.5 mg, Oral, Q6H PRN **OR** ondansetron (ZOFRAN) injection 4 mg, 4 mg, Intravenous, Q6H PRN  cefepime (MAXIPIME) 1000 mg IVPB extended (mini-bag), 1,000 mg, Intravenous, Q12H    Allergies:  Statins    Social History:    Quit smoking in 2004. Prior to that smoked 1ppd for 15 years. Denies alcohol and illicit drug use. She has been at [facility] since her recent admission       Family History: Mother with Hx of \"a really bad heart\" that began in her 62s (specifics unclear). Review of Systems (difficult to obtain full review of systems due to her mentation)  · Constitutional: Denies fevers, chills, night sweats, and fatigue  · HEENT: Denies headaches, epistaxis or bleeding gums. · Musculoskeletal: Denies falls, myalagias or arthralgias  · Neurological: Denies dizziness, syncope, numbness and tingling  · Cardiovascular: Denies chest pain or palpitations. She has chronic lower extremity edema/lymphedema   · Respiratory: Dyspnea as above.  She denies cough but is unable to answer whether she has had PND or orthopnea. · Gastrointestinal: Denies heartburn, nausea/vomiting, diarrhea, or black/bloody stools. · Genitourinary: Denies dysuria and hematuria. She is unsure if she has had any changes in output. · Hematologic: Denies excessive bruising or bleeding  · Endocrine: Denies excessive thirst or temperature intolerance. · GYN: Postmenopausal state; denies vaginal bleeding. Physical Exam:   /62   Pulse 124   Temp 98 °F (36.7 °C) (Temporal)   Resp 24   LMP  (LMP Unknown)   SpO2 94%   CONST:  Well developed, morbidly obese, elderly female who appears stated age. Lethargic. No apparent acute distress. HEENT:   Head- Normocephalic, atraumatic   Throat- Oral mucosa pink and moist  Neck-  Short, thick neck without obvious JVD. No carotid bruit. CHEST: Chest symmetrical and non-tender to palpation. Respirations unlabored. RESPIRATORY: Breath sounds clear but diminished: poor inspiratory effort   CARDIOVASCULAR:     Heart Ausculation-  Irregular and tachycardic, no murmur, s3, or rub   PV: Bilateral lower extremity lymphedema. Feet warm to touch. ABDOMEN: Soft, morbidly obese, non-tender to light palpation. Bowel sounds present. No palpable masses   MS: Good muscle strength and tone. No atrophy or abnormal movements. : Hansen catheter draining dark yellow urine  SKIN: Warm and dry with erythema and thick, dry, scaly skin to lower legs   NEURO / PSYCH: Oriented to person and place. Lethargic as above.        Intake/Output Summary (Last 24 hours) at 5/1/2021 0840  Last data filed at 5/1/2021 0534  Gross per 24 hour   Intake 1053.25 ml   Output 1600 ml   Net -546.75 ml   urinary output last two shifts: 1100, 500 mls    Labs:   CBC:   Recent Labs     04/29/21  1614 04/30/21  0652   WBC 9.3 8.4   HGB 10.2* 9.0*   HCT 34.1 29.9*   * 91*     BMP:   Recent Labs     04/29/21  1614      K 4.2   CO2 30*   BUN 47*   CREATININE 1.8*   LABGLOM 28   CALCIUM 8.7   HgA1c:   Lab Results   Component Value Date    LABA1C 10.9 (H) 04/30/2021   CARDIAC ENZYMES:  Recent Labs     04/29/21  1614 04/30/21  0652   TROPONINI 0.02 0.02     FASTING LIPID PANEL:  Lab Results   Component Value Date    CHOL 355 01/16/2021    HDL 52 01/16/2021    LDLCALC 243 01/16/2021    TRIG 298 01/16/2021     LIVER PROFILE:  Recent Labs     04/29/21  1614   AST 19   ALT 10   LABALBU 3.2*     CXR 4/29/2021:  Impression:  Mild CHF    CT abdomen and pelvis without contrast 4/29/2021:  Right adrenal mass measuring approximately 4.3 cm in diameter, increased in  the interval since the prior examination.  Indeterminate lesion, malignancy  must be suspected and cannot be excluded.  Surgical consultation recommended. Consider biochemical evaluation for functional status and pheochromocytoma  prior to resection. Splenomegaly. Atelectatic changes in the right lung base with no airspace consolidation or  pleural effusions. Perinephric stranding bilaterally, left greater than right which may be  chronic.  Renal inflammatory process cannot be excluded.  No urolithiasis or  obstructive uropathy. Assessment:  1. Atrial fibrillation with RVR  · In the setting of bacteremia, YEIMI, anemia, probable SAM (untreated), etc    2. Known PAF  · SR on 4/2021 EKG  · No history of DCCV/AAD therapy  · Mildly dilated left atrium on TTE 4/2021  · Anticoagulated with Eliquis    3. Chronic HFpEF with dilated RV and evidence of pulmonary hypertension  · ACC stage C/NYHA class III (but confounded by her co morbidities)     4. History of PFO     5. History of TIA     6. COPD with chronic respiratory failure    7. Probable SAM: she has declined testing    8. Type 2 diabetes, insulin requiring with neuropathy. HGA1C 10.2     9. Hypertension: controlled    10. Severe hyperlipidemia (LDL of 243 in 1/2021) with past statin intolerance     11. Morbid Obesity    11. YEIMI  · BUN 26, Cr 0.9 on 4/17 ---> 47, 1.8 on presentation 4/29    12. Bacteremia (gram negative rods)    13. Chronic iron deficiency anemia and new onset thrombocytopenia       Recommendations:    1. Rate control approach for now given her acute illness: will continue diltiazem drip and oral metoprolol. If she does not self-convert, can consider DCCV +/- AAD when she is stable from infectious and metabolic standpoint. 2. Continue Eliquis for stroke risk reduction     3. Agree with gentle hydration and monitoring of BMP    4. Resume Aldactone when renal function stable    5. Add Zetia for hyperlipidemia    6. Aggressive risk factor modification     The above assessment and recommendations were made in collaboration with Dr. Fabrice Blanco, [facility]    ____________________________________________________________________  I independently interviewed and examined the patient. I have reviewed the above documentation completed by the DEAN. Please see my additional contributions to the HPI, physical exam, and assessment / medical decision making. HPI, ROS, PMH, PSH, [address], [place], and medications independently reviewed (agree; see above documentation)    History of Present Illness:  Currently with no chest pain, respiratory distress, or palpitations. +somnolent. Atrial fibrillation with RVR on EKG and telemetry.     Review of Systems:   Cardiac: As per HPI  General: No fever, chills  Pulmonary: As per HPI  HEENT: No visual disturbances, difficult swallowing  GI: No nausea, vomiting  : No dysuria, hematuria  Endocrine: No thyroid disease, +DM  Musculoskeletal: MEHTA x 4, no focal motor deficits  Skin: LE stasis dermatitis, no rashes  Neuro: No headache, seizures  Psych: Currently with no depression, anxiety    Physical Exam:  /62   Pulse 124   Temp 98 °F (36.7 °C) (Temporal)   Resp 24   LMP  (LMP Unknown)   SpO2 94%   Wt Readings from Last 3 Encounters:   04/17/21 272 lb 14 oz (123.8 kg)   01/19/21 260 lb 6 oz (118.1 kg)   11/22/20 282 lb (127.9 kg)     Appearance: Somnolent, no acute respiratory distress  Skin: LE stasis dermatitis, no rashes  Head: Normocephalic, atraumatic  Eyes: EOMI, no conjunctival erythema  ENMT: No pharyngeal erythema, MMM, no rhinorrhea  Neck: Supple, no carotid bruits  Lungs: Decreased BS B/L, no wheezing  Cardiac: Regular rate and rhythm, +S1S2, no murmurs apparent  Abdomen: Soft, nontender, +bowel sounds  Extremities: Moves all extremities x 4, +lower extremity edema  Neurologic: No focal motor deficits apparent, somnolent    Laboratory Tests:  Recent Labs     04/29/21  1614 04/30/21  1200     --    K 4.2  --    CL 87*  --    CO2 30*  --    BUN 47*  --    CREATININE 1.8*  --    GLUCOSE 298* 367   CALCIUM 8.7  --      Lab Results   Component Value Date    MG 2.1 01/16/2021     Recent Labs     04/29/21  1614   ALKPHOS 76   ALT 10   AST 19   PROT 6.5   BILITOT 0.4   LABALBU 3.2*     Recent Labs     04/29/21  1614 04/30/21  0652   WBC 9.3 8.4   RBC 4.31 3.79   HGB 10.2* 9.0*   HCT 34.1 29.9*   MCV 79.1* 78.9*   MCH 23.7* 23.7*   MCHC 29.9* 30.1*   RDW 15.7* 15.6*   * 91*   MPV 11.5 11.1     Lab Results   Component Value Date    CKTOTAL 85 01/13/2021    CKMB 2.7 01/13/2021    TROPONINI 0.02 04/30/2021    TROPONINI 0.02 04/29/2021    TROPONINI <0.01 04/09/2021     Lab Results   Component Value Date    TSH 0.575 01/15/2021     Lab Results   Component Value Date    LABA1C 10.9 (H) 04/30/2021     No results found for: EAG  Lab Results   Component Value Date    CHOL 355 (H) 01/16/2021    CHOL 287 (H) 05/26/2020    CHOL 318 (H) 11/07/2019     Lab Results   Component Value Date    TRIG 298 (H) 01/16/2021    TRIG 512 (H) 05/26/2020    TRIG 861 (H) 11/07/2019     Lab Results   Component Value Date    HDL 52 01/16/2021    HDL 33 05/26/2020    HDL 28 11/07/2019     Lab Results   Component Value Date    LDLCALC 243 (H) 01/16/2021    LDLCALC - (AA) 05/26/2020    LDLCALC - (AA) 11/07/2019     Lab Results   Component Value Date    LABVLDL 60 01/16/2021    LABVLDL - (AA) 05/26/2020    LABVLDL - (AA) 11/07/2019     No results found for: CHOLHDLRDEUCEO  No results for input(s): PROBNP in the last 72 hours. Cardiac Tests:  EKG reviewed (EKG date: 4/30/21): atrial fibrillation/flutter, rate 149, NSSTT changes    Telemetry reviewed (date: 5/1/2021): atrial fibrillation, rate 120's-130's    Echocardiogram reviewed: 4/14/21 (Dr. Prema Patino)   Normal left ventricle size and systolic function. Ejection fraction is visually estimated at 60-65%. No regional wall motion abnormalities seen. Mild concentric left ventricular hypertrophy. Indeterminate diastolic function. The left atrium is mildly dilated. Moderately dilated right ventricle. Right ventricle global systolic function is normal . TAPSE 32 mm. Physiologic and/or trace mitral regurgitation is present. No hemodynamically significant aortic stenosis is present. Mild tricuspid regurgitation. RVSP is 69 mmHg. Pulmonary hypertension is moderate . No evidence for hemodynamically significant pericardial effusion. Compared to prior echo of 2015, changes noted. Now, there is moderate pulmonary hypertension with moderately dilated RV. - Prior cardiac studies reviewed today / SR on 4/2021 EKG  - Continue metoprolol and cardizem  - Continue eliquis  - If she does not spontaneously convert to SR, will reassess for rhythm control once noncardiac issues stabilize  - Aggressive risk factor modifications  - Work-up for SAM (she has historically declined testing)  - Follow-up repeat BMP (YEIMI) -- diuretics currently on hold and she is receiving IV fluids  - Follow-up ID recommendations    Greater than 60 minutes was spent counseling the patient, reviewing the rationale for the above recommendations and reviewing the patient's current medication list, problem list and results of all previously ordered testing. Thank you for allowing me to participate in your patient's care.  Please feel free to contact me if you have any questions or concerns.     Tamika Kaiser MD  [facility]

## 2022-03-17 NOTE — PLAN OF CARE
Problem: Non-Violent Restraints  Goal: Removal from restraints as soon as assessed to be safe  Outcome: Met This Shift  Goal: No harm/injury to patient while restraints in use  Outcome: Met This Shift  Goal: Patient's dignity will be maintained  Outcome: Met This Shift     Problem: Discharge Planning:  Goal: Participates in care planning  Description: Participates in care planning  Outcome: Met This Shift     Problem: Discharge Planning:  Goal: Participates in care planning  Description: Participates in care planning  Outcome: Met This Shift     Problem: Aspiration:  Goal: Absence of aspiration  Description: Absence of aspiration  Outcome: Met This Shift     Problem: Anxiety/Stress:  Goal: Level of anxiety will decrease  Description: Level of anxiety will decrease  Outcome: Met This Shift     Problem: Mental Status - Impaired:  Goal: Mental status will be restored to baseline  Description: Mental status will be restored to baseline  Outcome: Met This Shift     Problem: Tissue Perfusion - Cardiopulmonary, Altered:  Goal: Hemodynamic stability will improve  Description: Hemodynamic stability will improve  Outcome: Met This Shift

## 2022-03-17 NOTE — PROGRESS NOTES
Bedside swallow evaluation preformed. Patient tolerated spoonful of water without any signs of choking/aspiration, advanced to larger sip, still without any difficulty. Patient currently on 6L NC spO2 97%.

## 2022-03-17 NOTE — PROGRESS NOTES
Critical Care Team - Daily Progress Note         Date and time: 3/17/2022 8:48 AM  Patient's name:  S[place] AG[place] [address] Record Number: [id number]  Patient's account/billing number: [de-identified]  Patient's YOB: 1955   Age: 74 y.o. Date of Admission: 3/8/2022  4:28 AM  Length of stay during current admission: 9      Primary Care Physician: Rosa Maria Wilson DO  ICU Attending Physician: Dr. Puente Blas Status: Full Code    Reason for ICU admission: Respiratory failure, shock      SUBJECTIVE:     OVERNIGHT EVENTS:    3/16/2022: Vent day 9, patient attempted weaning trial yesterday without success. Patient with agitation reported over night Remains on Versed 5 mg , Fentanyl 200 mcg Precedex 1.5 mcg. On Seroquel. Tube feeds running at 50.   3/15: Vent day #8. Patient was still agitated overnight. Patient was unable to be weaned off vent yesterday. We will likely try again today. She remains on Versed, fentanyl, Precedex. This morning she did experience a drop in her blood sugars, likely due to the decrease in steroids. Tube feeds are still running at this time. Lantus will be adjusted today. 3/14: Vent day #7. Overnight patient was extremely agitated. Patient pulled art line. Patient had to be sedated because she kept trying to pull out her ETT. She is currently on versed, fentanyl and precedex. Patient tolerated Seroquel well. 3/13: Patient did not experience any acute events overnight. She has been very agitated however and trying to pull at tube. She is still on pressors at this time. 3/12: Pressors decreased. Mentation has not been appropriate yet. Tolerating tube feeds. 3/11: Patient had no acute problems overnight. She is currently being weaned off of vaso and weaned off sedation. She has been persistently hyperglycemic. 3/10: Pressors were changed from levo to rachelle. She would awake intermittently and be agitated. Precedex was started.   Patient is tolerating tube feeds as well. 3/9: Patient has converted to sinus rhythm, opening eyes with spells of anxious awakenings. Levophed stopped. Remains on Vent. 3/8: Patient presented to the ICU with hypotension and respiratory failure. Patient was started on pressors and antibiotics at that time. Patient was also started on amiodarone drip for her A. fib. Patient was also started on insulin drip.     CURRENT VENTILATION STATUS:     [x] Ventilator  [] BIPAP  [] Nasal Cannula [] Room Air      IF INTUBATED, ET TUBE MARKING AT LOWER LIP:  cms    SECRETIONS  Amount:  [x] Small [] Moderate  [] Large  [] None  Color:     [] White [] Colored  [] Bloody    SEDATION:  RAAS Score:  [] Propofol gtt  [x] Versed gtt  [x] Fentanyl gtt [] Ativan gtt   [x] Precedex    PARALYZED:  [x] No    [] Yes      VASOPRESSORS:  [x] No    [] Yes    If yes -   [] Levophed       [] Dopamine     [] Vasopressin       [] Dobutamine  [] Phenylephrine         [] Epinephrine    CENTRAL LINES:     [] No   [x] Yes   (Date of Insertion: 3/8)           If yes -     [x] Right IJ     [] Left IJ [] Right Femoral [] Left Femoral                   [] Right Subclavian [] Left Subclavian       BRAUN'S CATHETER:   [] No   [x] Yes  (Date of Insertion: )     URINE OUTPUT:            [x] Good   [] Low              [] Anuric      OBJECTIVE:     VITAL SIGNS:  /64   Pulse 89   Temp 100.2 °F (37.9 °C) (Bladder)   Resp 28   Ht 5' 4\" (1.626 m)   Wt (!) 330 lb 3.2 oz (149.8 kg)   LMP  (LMP Unknown)   SpO2 92%   BMI 56.68 kg/m²   Tmax over 24 hours:  Temp (24hrs), Av.8 °F (38.2 °C), Min:100.2 °F (37.9 °C), Max:101.8 °F (38.8 °C)      Patient Vitals for the past 6 hrs:   BP Temp Temp src Pulse Resp SpO2   22 0824 -- -- -- 89 28 92 %   22 0758 -- -- -- -- 26 91 %   22 075 -- -- -- -- 25 91 %   22 0756 -- -- -- 59 27 92 %   22 0742 -- -- -- 54 24 93 %   22 0600 122/64 -- -- 67 22 93 %   22 0500 -- -- -- 66 28 91 % 03/17/22 0402 -- -- -- 68 23 92 %   03/17/22 0400 124/70 100.2 °F (37.9 °C) Bladder 70 24 92 %   03/17/22 0300 139/67 -- -- 62 18 93 %         Intake/Output Summary (Last 24 hours) at 3/17/2022 0848  Last data filed at 3/17/2022 0600  Gross per 24 hour   Intake 3574.02 ml   Output 4995 ml   Net -1420.98 ml     Wt Readings from Last 2 Encounters:   03/14/22 (!) 330 lb 3.2 oz (149.8 kg)   08/31/21 (!) 304 lb (137.9 kg)     Body mass index is 56.68 kg/m². PHYSICAL EXAMINATION:    Physical Exam  Constitutional:       Appearance: She is morbidly obese. She is ill-appearing. Interventions: She is sedated and intubated. HENT:      Head: Normocephalic and atraumatic. Eyes:      Conjunctiva/sclera: Conjunctivae normal.   Neck:      Trachea: No tracheal deviation. Cardiovascular:      Rate and Rhythm: Normal rate and regular rhythm. Heart sounds: Normal heart sounds. Pulmonary:      Effort: Pulmonary effort is normal. She is intubated. Breath sounds: Decreased breath sounds present. Abdominal:      General: Bowel sounds are normal.      Palpations: Abdomen is soft. Tenderness: There is no abdominal tenderness. Musculoskeletal:      Cervical back: Neck supple. Right lower leg: Edema present. Left lower leg: Edema present. Lymphadenopathy:      Cervical: No cervical adenopathy. Skin:     General: Skin is warm and dry. Findings: No rash. Comments: +bl lower extremity lymphedema chronic skin changes with right leg fissure erythma   Neurological:      General: No focal deficit present.    Psychiatric:         Behavior: Behavior normal.      Any additional physical findings: n/a    MEDICATIONS:    Scheduled Meds:   ipratropium-albuterol  1 ampule Inhalation Q6H WA    Arformoterol Tartrate  15 mcg Nebulization BID    budesonide  0.5 mg Nebulization BID    bumetanide  1 mg IntraVENous BID    diazePAM  5 mg Oral Q6H    oxyCODONE  10 mg Oral Q6H    metoprolol tartrate  25 mg Oral BID    insulin glargine  30 Units SubCUTAneous BID    lidocaine PF  5 mL IntraDERmal Once    sodium chloride flush  5-40 mL IntraVENous 2 times per day    heparin flush  3 mL IntraVENous 2 times per day    QUEtiapine  50 mg Oral BID    hydrocortisone sodium succinate PF  25 mg IntraVENous Q8H    linezolid  600 mg Per NG tube 2 times per day    mupirocin   Nasal BID    insulin lispro  0-18 Units SubCUTAneous Q4H    docusate  100 mg Per NG tube BID    senna  10 mL Per NG tube BID    enoxaparin  1 mg/kg SubCUTAneous BID    chlorhexidine  15 mL Mouth/Throat BID    pantoprazole  40 mg IntraVENous Daily    miconazole   Topical BID     Continuous Infusions:   amiodarone 0.5 mg/min (03/17/22 0600)    phenylephrine (DARLYN-SYNEPHRINE) 50mg/250mL infusion      dexmedetomidine (PRECEDEX) IV infusion 1.2 mcg/kg/hr (03/17/22 0830)    sodium chloride      fentaNYL 5 mcg/ml in 0.9%  ml infusion 175 mcg/hr (03/17/22 0827)    dextrose      midazolam 7 mg/hr (03/17/22 0758)    sodium chloride 10 mL/hr at 03/17/22 0600     PRN Meds:   ipratropium-albuterol, 1 ampule, Q4H PRN  sodium chloride flush, 5-40 mL, PRN  sodium chloride, 25 mL, PRN  heparin flush, 3 mL, PRN  white petrolatum, , BID PRN  fentanNYL, 25 mcg, Q2H PRN  dextrose bolus (hypoglycemia), 125 mL, PRN   Or  dextrose bolus (hypoglycemia), 250 mL, PRN  glucose, 15 g, PRN  glucagon (rDNA), 1 mg, PRN  dextrose, 100 mL/hr, PRN  midazolam, 2 mg, Q3H PRN  acetaminophen, 650 mg, Q4H PRN  acetaminophen, 650 mg, Q4H PRN          VENT SETTINGS (Comprehensive) (if applicable):  Vent Information  $Ventilation: $Subsequent Day  Skin Assessment: Clean, dry, & intact  Equipment ID: [id number]  Vent Type: [phone]  Vent Mode: (S) AC/PC  Vt Ordered: 0 mL  Pressure Ordered: (S) 30  Rate Set: 14 bmp  Peak Flow: 0 L/min  Pressure Support: 0 cmH20  FiO2 : 30 %  SpO2: 92 %  SpO2/FiO2 ratio: 306.67  Sensitivity: 2  PEEP/CPAP: 5  I Time/ I Time %: (S) 0.6 s  Humidification Source: Heated wire  Humidification Temp: 37  Humidification Temp Measured: 37.1  Circuit Condensation: Drained  Additional Respiratory  Assessments  Pulse: 89  Resp: 28  SpO2: 92 %  Position: Semi-Chapin's  Humidification Source: Heated wire  Humidification Temp: 37  Circuit Condensation: Drained  Oral Care: Mouth suctioned,Mouth swabbed,Mouth moisturizer,Lip moisturizer applied  Subglottic Suction Done?: Yes  Cuff Pressure (cm H2O): 29 cm H2O    ABGs:   Recent Labs     03/17/22  0530   PH 7.466*   PCO2 46.6*   PO2 64.6*   HCO3 32.8*   BE 8.2*   O2SAT 92.1       Laboratory findings:    Complete Blood Count:   Recent Labs     03/15/22  0530 03/16/22  0510 03/17/22  0500   WBC 9.9 9.1 7.9   HGB 8.0* 8.0* 7.9*   HCT 27.3* 27.7* 27.3*    248 263        Last 3 Blood Glucose:   Recent Labs     03/16/22  0510 03/16/22  1730 03/17/22  0500   GLUCOSE 168* 220* 194*        PT/INR:    Lab Results   Component Value Date    PROTIME 19.5 04/29/2021    INR 1.8 04/29/2021     PTT:    Lab Results   Component Value Date    APTT 36.3 04/29/2021       Comprehensive Metabolic Profile:   Recent Labs     03/16/22  0510 03/16/22  0510 03/16/22  1730 03/17/22  0500     --  147* 145   K 4.0  --  3.6 4.5     --  104 104   CO2 32*  --  33* 34*   BUN 20  --  20 23   CREATININE 0.8  --  0.9 1.0   GLUCOSE 168*  --  220* 194*   CALCIUM 8.2*  --  7.7* 7.7*   PROT 6.5   < >  --  6.2*   LABALBU 2.8*   < >  --  2.7*   BILITOT 0.3   < >  --  0.3   ALKPHOS 130*   < >  --  112*   AST 22   < >  --  17   ALT 83*   < >  --  55*    < > = values in this interval not displayed.       Magnesium:   Lab Results   Component Value Date    MG 1.9 03/17/2022     Phosphorus:   Lab Results   Component Value Date    PHOS 4.6 03/17/2022     Ionized Calcium:   Lab Results   Component Value Date    CAION 1.37 10/24/2016        Urinalysis: No results found for: UA     Troponin: No results for input(s): TROPONINI in the last 72 hours.    Microbiology:   Culture, Blood 2   Date Value Ref Range Status   03/15/2022 24 Hours no growth  Preliminary       Cultures during this admission:     Blood cultures:  [] None drawn      [x] Negative             [x]  Positive (Details: 3/8 beta strep group G, repeats 3/9 negative)  Urine Culture:   [] None drawn      [x] Negative             []  Positive (Details:  )  Sputum Culture:  [x] None drawn      [] Negative             []  Positive (Details:  )   Wound Culture:  [] None drawn      [] Negative             [x]  Positive (Details: S. Aureus, Proteus sp 3/8  )     Other pertinent Labs:        Radiology/Imaging:     Chest Xray (3/17/2022):  Endotracheal tube terminates near the aissatou and should be retracted   approximately 2-3 cm for more optimal positioning. No other change. The findings were sent to the Radiology Results [address] at 8:00   am on 3/15/2022to be communicated to a licensed caregiver. ASSESSMENT:     Principal Problem:    Sepsis ([address]  Resolved Problems:    * No resolved hospital problems. *      PLAN:     WEAN PER PROTOCOL:  [] No   [x] Yes  [] N/A    DISCONTINUE ANY LABS:   [x] No   [] Yes    ICU PROPHYLAXIS:  Stress ulcer:  [x] PPI Agent  [] [id number] [] Sucralfate  [] Other:  VTE:   [] Enoxaparin  [] Unfract.  Heparin Subcut  [] EPC Cuffs    NUTRITION:  [x] NPO [x] Tube Feeding (Specify: ) [] TPN  [] PO (Diet: Diet NPO  ADULT TUBE FEEDING; Nasogastric; Diabetic; Continuous; 10; Yes; 15; Q 8 hours; 50; 75; Q 4 hours)    HOME MEDICATIONS RECONCILED: [] No  [x] Yes    INSULIN DRIP:   [x] No   [] Yes    CONSULTATION NEEDED:  [x] No   [] Yes    FAMILY UPDATED:    [] No   [x] Yes    TRANSFER OUT OF ICU:   [x] No   [] Yes    SYSTEMS ASSESSMENT    Neuro     Intubated, sedated  Fentanyl, Versed, Precedex  Agitated at times    Agitation   Seroquel 50 mg BID  Precedex continued  Will transition to oxycodone and valium Q 6 hours    Respiratory     Metabolic Alkalosis with Respiratory Acidosis  Intubated Day 8   AC/VC, FiO2 30, vet set 420, rate 16, PEEP 8   Was unable to be extubated yesterday  Wean oxygen as tolerated.  Keep O2 sat 90-92%\  Pulmonology consulted  Bumex ordered for diuresis   Pressure support as tolerated     Cardiovascular     Paroxysmal atrial fibrillation  Off amnio drip  Continue to monitor  Therapeutic Lovenox    Septic shock  Solu-Cortef 25 mg Q8H  Blood cultures positive Beta strep group G 3/8, repeats negative  Wound culture 3/8 Staph aureus and Proteus sp  Patient remains febrile  Off pressors  IV NS 20 mL/hr  See infectious disease below    Gastrointestinal     Possible Cholecystitis  Ultrasound 3/11 gallstones and edematous gallbladder wall  Consider surgery consult    GI PPx  Protonix 40 mg IV daily    Bowel Regimen  Colace 100 mg BID  Senna daily    Renal     CKD stage III, stable  Baseline creatinine 1.1-1.2  Continue to monitor    Electrolyte abnormalities  Replete as needed  Continue to monitor     Infectious Disease     Group G Streptococcus Septicemia  Likely 2/2 to cellulitis of leg (however, cultures grew Proteus)  Blood cultures positive x2 for beta strep group G  Ceftriaxone 2g Q24H (completed 7 days with 1 day of cefepime prior), D/C on 3/16/2022  Linezolid 600 mg Q12H (Day 4)  Remains febrile  Off pressors  ID following, appreciate input    Leukocytosis, resolved  Likely secondary to infection and steroid use  Remains febrile  Continue to monitor    Febrile  Tylenol suppository every 4 hours as needed for fever    Hematology/Oncology     Anticoagulation  Lovenox 135 mg twice daily      Chronic Normocytic Anemia  Baseline Hgb 9  Continue to monitor  Transfuse for Hgb <7    Endocrine     T2DM  On  Lantus to 30 units twice daily d/t hypoglycemia  HDSSI  Hypoglycemia protocol  POC glucose checks    Social/Spiritual/DNR/Other     Full code    Cecilia Ott MD, PGY-1            3/17/2022, 8:48 AM    Attending Physician Attestation: Dr. Yo León Nawaf    Thank you very much for allowing me to see this patient in consultation and follow up. I personally saw, examined and provided care for the patient. Radiographs, labs and medication list were reviewed by me independently. I spoke with bedside nursing, respiratory therapists and consultants. Critical care services and times documented are independent of procedures and multidisciplinary rounds with Residents. Additionally comprehensive, multidisciplinary rounds were conducted with the MICU team. The case was discussed in detail and plans for care were established. Review of Residents documentation was conducted and revisions were made as appropriate. I agree with the the above documented information.      Physical Examination:  Vitals:   Vitals:    03/17/22 0756 03/17/22 0757 03/17/22 0758 03/17/22 0824   BP:       Pulse: 59   89   Resp: 27 25 26 28   Temp:       TempSrc:       SpO2: 92% 91% 91% 92%   Weight:       Height:          General: No Acute Distress, on vent, agitated   HEENT: normocephalic, atruamatic  CVS: RRR, S1, S2, No S3 or S4  Respiratory: decreased breath sounds at lung bases, no focal wheezes noted  Extremities: no clubbing/cyanosis/edema  Neuro: intubated      ASSESSMENT:  · Severe Sepsis with Septic Shock - lower extremity cellulitis, Grp G Streptococcus bacteremia/septicemia   · Probable cellulitis right lower extremity  · Group G Streptococcus septicemia probably associated to cellulitis cultures of the leg, however, grew Proteus  · Possible intra-abdominal infection.  Possible cholecystitis  · Acute Respiratory Failure - on ACVC mechanical ventilation    · Leukocytosis, improving  · Chronic Hypoxic Respiratory Failure - baseline 6L O2 NC  · Moderate/Severe COPD  · Elevation of transaminases.  Possible cholecystitis.  They seem to be trending downward.  Ultrasound shows gallstones and edematous gallbladder wall  · Morbid Obesity - BMI 51  · Atrial Fibrillation with RVR - DCCV x 1 cardioversion 3/16/2022, cardizem and amiodarone given, with initiation of amiodarone drip    · Chronic Diastolic CHF Dysfunction   · Chronic Lymphedema of the Lower Extremities      In addition the following applies:     Check: N/A  Medication Alterations: lantus to 30 units BID, bumex 1 mg IV BID, duoneb, brovana, pulmicort  Procedures: N/A  Imaging: reviewed  New Consultations: N/A  VENT: ACPC (DAY 10) 14/35/40/5 to wean and extubation to AVAPS  Ppeak: 40  Pplateau: 30  [id number]: 12     Access:Left IJ TLC   Consults: ID, Dietary, Cardiology    Drips: Versed, Fentanyl, Precedex, Amiodarone   Nutrition: Tube Feeds  ABX: Linezolid  Completed ABX: Ceftriaxone     - unable to liberate from mechanical ventilation 3/14, 3/15, 3/16  - DCCV x 1 cardioversion 3/16/2022, cardizem and amiodarone given, with initiation of amiodarone drip    - liberation from vent to AVAPS on 3/17/2022, family present and updated at bedside  - 13L + at this time as of 3/16/2022, diuresis, supportive care  - patient already on therapeutic anticoagulation with lovenox subcutaneous   - wean off precedex while off mechanical ventilation  - OXY IR and Valium PO  - Dr. Vitor Hardy updated at bedside about condition and direction of care for their established office patient   - no stridor post-extubation, patient could phonate      Thank you for allowing me to participate in the care of this patient. Care reviewed with nursing staff, medical and surgical specialty care, primary care and the patient's family as available. Restraints are ordered when the patient can do harm to him/herself by pulling out devices. Critical Care Time: > 35 minutes excluding procedures    Sisi Miller M.D.     Sisi Miller MD  3/17/2022  8:48 AM

## 2022-03-17 NOTE — PROGRESS NOTES
Pts daughter Jose Daniel [place] phoned in & given update on pts status, Concerns voiced regarding sedation & restraints. Daughter requesting pt not received any more sedation.  Advised to discuss concerns with Drs

## 2022-03-17 NOTE — CONSULTS
INPATIENT CARDIOLOGY CONSULT    Name: Nick Alvarez    Age: 74 y.o. Date of Admission: 3/8/2022  4:28 AM    Date of Service: 3/17/2022    Reason for Consultation: Acute combined hypoxic and hypercapnic respiratory failure superimposed upon chronic hypoxic respiratory failure, chronic diastolic heart failure, resolving sepsis, chronic obstructive lung disease, hypertension, paroxysmal atrial fibrillation, morbid obesity    Referring Physician: Gabriela Pettit MD    History of Present Illness: The patient is a 58-year-old white female known to [facility] with primary cardiovascular care provided by Dorothy Sanchez. She has a known history of chronic diastolic heart failure in the face of hypertension, diabetes, hyperlipidemia and chronic obstructive lung disease with associated chronic hypoxic respiratory failure in addition to that of a remote transient cerebral ischemic episode and a prior reported patent foramen ovale in addition to that of morbid obesity and suspected but not documented and presently untreated obstructive sleep apnea. She remained compensated at the time of her most recent evaluation approximately 9 months earlier. A most recent echocardiogram of April, 2021 demonstrated evidence of a normal-sized left ventricular chamber with mild concentric left ventricular hypertrophy and normal left ventricular systolic function with moderate right ventricular dilatation in the absence of documented right ventricular dysfunction and biatrial enlargement with moderate pulmonary hypertension right ventricular systolic pressures of approximately 70 mmHg.   She was admitted from an extended care facility in excess of 1 week earlier with apparent evidence of acute combined hypoxic and hypercapnic respiratory failure as well as that of septic shock requiring intubation mechanical ventilation as well as that of vasoactive support and with initial electrocardiographic tracings demonstrating sinus tachycardia with left posterior fascicular block and right bundle branch block conduction patterns. She has subsequently been able to be weaned from vasoactive support with persistent hypoxic respiratory failure and a lack of success weaning from mechanical ventilation largely on the basis of agitation. The prior afternoon she began to develop progressive tachycardia with the development of hypotension and electrocardiographic documentation of atrial fibrillation with rates of approximately 175 bpm upon review of electrocardiographic tracings and continued evidence of left posterior fascicular block and right bundle branch block conduction patterns with diffuse ST depression in the absence of significant metabolic derangements with initially attempted rate control and subsequent unsuccessful attempt at electrical cardioversion and eventually with spontaneous conversion to sinus rhythm following amiodarone administration. Most recent radiographic studies again reviewed demonstrate poor inspiratory effort with potential cardiomegaly and the suggestion of a potential developing right-sided infiltrate. A most recent proBNP level of 5190 pg/mL was noted elevated from that of hospitalization as well as that of elevation of a high-sensitivity troponin level in excess of that previously documented. Presently, she is awake and somewhat agitated in the face of ongoing sedation and hemodynamically stable with a persistent low-grade fever.     .    Review of Systems:   Based on the patient's medical condition, a review of systems could not be obtained    Past Medical History:  Past Medical History:   Diagnosis Date    (HFpEF) heart failure with preserved ejection fraction ([facility] [address]     1/16/2018- echo- LVEF 55-65%    Anal fissure     Anemia 10/6/2017    Anxiety and depression     Arthritis     Asthma     Atrial fibrillation ([address]     Eliquis    Blood transfusion     long time no reaction    CHF (congestive heart failure) ([facility] [address] GASTROINTESTINAL ENDOSCOPY  2004    gastritis, bx (no H pylori), Dr. Betsy Emery, [address] GASTROINTESTINAL ENDOSCOPY N/A 2018    EGD BIOPSY performed by Usman Gonsalves MD at [place] [address] N/A 2018    EGD BIOPSY performed by Usman Gonsalves MD at [facility] ENDOSCOPY       Family History:  Family History   Problem Relation Age of Onset    Heart Disease Mother     Diabetes Father    Berl Vern Father     Other Father         Rocio Prime Sister     Other Sister         shingles- has had over 1 year    Diabetes Paternal Aunt     Diabetes Paternal Uncle     Diabetes Paternal Aunt     Diabetes Paternal Uncle     Diabetes Sister        Social History:  Social History     Socioeconomic History    Marital status: Single     Spouse name: Not on file    Number of children: 3    Years of education: 9    Highest education level: 9th grade   Occupational History    Occupation: SSD   Tobacco Use    Smoking status: Former Smoker     Packs/day: 1.50     Years: 25.00     Pack years: 37.50     Types: Cigarettes     Start date: 1979     Quit date: 2004     Years since quittin.2    Smokeless tobacco: Never Used    Tobacco comment: Stopped smoking 16 years ago   Vaping Use    Vaping Use: Never used   Substance and Sexual Activity    Alcohol use: Not Currently     Comment: 1 can coke daily    Drug use: Not Currently    Sexual activity: Not Currently     Partners: Male   Other Topics Concern    Not on file   Social History Narrative    Denies caffeine. Grandson shops for her and helps around the house    Patient unable to exercise d/t mobility status     Patient lives with her  who has limited speech ability d/t hx of CVA.  He is able to assist patient with IADLS     Social Determinants of Health     Financial Resource Strain:     Difficulty of Paying Living Expenses: Not on file   Food Insecurity:     Worried About Running Out of Food in the Last Year: Not on file    Ran Out of Food in the Last Year: Not on file   Transportation Needs:     Lack of Transportation (Medical): Not on file    Lack of Transportation (Non-Medical): Not on file   Physical Activity:     Days of Exercise per Week: Not on file    Minutes of Exercise per Session: Not on file   Stress:     Feeling of Stress : Not on file   Social Connections:     Frequency of Communication with Friends and Family: Not on file    Frequency of Social Gatherings with Friends and Family: Not on file    Attends Mosque Services: Not on file    Active Member of [employer] or Organizations: Not on file    Attends Club or Organization Meetings: Not on file    Marital Status: Not on file   Intimate Partner Violence:     Fear of Current or Ex-Partner: Not on file    Emotionally Abused: Not on file    Physically Abused: Not on file    Sexually Abused: Not on file   Housing Stability:     Unable to Pay for Housing in the Last Year: Not on file    Number of [place] in the Last Year: Not on file    Unstable Housing in the Last Year: Not on file       Allergies: Allergies   Allergen Reactions    Statins Myalgia       Home Medications:  Prior to Admission medications    Medication Sig Start Date End Date Taking?  Authorizing Provider   docusate sodium (COLACE) 100 MG capsule Take 100 mg by mouth nightly   Yes Historical Provider, MD   DULoxetine (CYMBALTA) 30 MG extended release capsule Take 30 mg by mouth every morning *SEE OTHER ORDER*   Yes Historical Provider, MD   DULoxetine (CYMBALTA) 60 MG extended release capsule Take 60 mg by mouth nightly *SEE OTHER ORDER*   Yes Historical Provider, MD   dilTIAZem (DILACOR XR) 240 MG extended release capsule Take 240 mg by mouth 2 times daily Indications: -HOLD FOR SBP<100 OR HR <60-   Yes Historical Provider, MD   insulin lispro (HUMALOG) 100 UNIT/ML injection vial Inject 5 Units into the skin once **DUE TO BS OF  512**   Yes Historical Provider, MD   insulin lispro (HUMALOG) 100 UNIT/ML injection vial Inject 35 Units into the skin 3 times daily (with meals) *PLUS SLIDING SCALE*   Yes Historical Provider, MD   insulin lispro (HUMALOG) 100 UNIT/ML injection vial Inject 0-15 Units into the skin 3 times daily (with meals) PER SLIDING SCALE: 0-149=0U, 150-200=3U, 201-250=6U, 251-300=9U, 301-50=12U, 351+=15U & CALL DR. Charma Najjar ([phone])  *SEE OTHER ORDERS*   Yes Historical Provider, MD   insulin lispro (HUMALOG) 100 UNIT/ML injection vial Inject 0-5 Units into the skin nightly PER SLIDING SCALE: 0-199=0U, 200-250=2U, 251-300=3U, 301-350=4U, 351+=5U  *SEE OTHER ORDERS*   Yes Historical Provider, MD   hydrOXYzine (ATARAX) 50 MG tablet Take 50 mg by mouth 3 times daily   Yes Historical Provider, MD   insulin glargine (LANTUS) 100 UNIT/ML injection vial Inject 49 Units into the skin every morning *SEE OTHER ORDER*   Yes Historical Provider, MD   Melatonin 10 MG TABS Take 10 mg by mouth nightly   Yes Historical Provider, MD   promethazine (PHENERGAN) 25 MG/ML injection Inject 25 mg into the muscle once   Yes Historical Provider, MD   ipratropium (ATROVENT) 0.02 % nebulizer solution Take 0.5 mg by nebulization 3 times daily *SEE OTHER ORDER*   Yes Historical Provider, MD   ipratropium (ATROVENT) 0.02 % nebulizer solution Take 0.5 mg by nebulization every 3 hours as needed for Wheezing *SEE OTHER ORDER*   Yes Historical Provider, MD   bisacodyl (DULCOLAX) 10 MG suppository Place 10 mg rectally daily as needed for Constipation   Yes Historical Provider, MD   ferrous sulfate (IRON 325) 325 (65 Fe) MG tablet Take 325 mg by mouth 2 times daily   Yes Historical Provider, MD   magnesium hydroxide (MILK OF MAGNESIA) 400 MG/5ML suspension Take 30 mLs by mouth daily as needed for Constipation   Yes Historical Provider, MD   Multiple Vitamins-Minerals (THERAPEUTIC MULTIVITAMIN-MINERALS) tablet Take 1 tablet by mouth daily   Yes Historical Provider, MD omeprazole (PRILOSEC) 40 MG delayed release capsule Take 40 mg by mouth Daily    Yes Historical Provider, MD   ezetimibe (ZETIA) 10 MG tablet Take 1 tablet by mouth nightly 5/6/21  Yes Radha Tanner MD   metoprolol succinate (TOPROL XL) 100 MG extended release tablet Take 1 tablet by mouth 2 times daily 5/5/21  Yes Radha Tanner MD   insulin glargine (LANTUS SOLOSTAR) 100 UNIT/ML injection pen Inject 52 Units into the skin nightly *SEE OTHER ORDER*   Yes Historical Provider, MD   miconazole (MICOTIN) 2 % powder Apply topically 2 times daily -BREAST & ABD FOLDS-   Yes Historical Provider, MD   mineral oil-hydrophilic petrolatum (AQUAPHOR) ointment Apply topically every 8 hours as needed (DRY SKIN TO THE ARMS & LEGS)    Yes Historical Provider, MD   acetaminophen (TYLENOL) 500 MG tablet Take 1,000 mg by mouth every 6 hours as needed for Pain Indications: -NTE: 3G/24HRS-    Yes Historical Provider, MD   apixaban (ELIQUIS) 5 MG TABS tablet Take 5 mg by mouth 2 times daily   Yes Historical Provider, MD   bumetanide (BUMEX) 2 MG tablet Take 2 mg by mouth 2 times daily   Yes Historical Provider, MD   gabapentin (NEURONTIN) 600 MG tablet Take 600 mg by mouth 3 times daily.     Yes Historical Provider, MD   vitamin D (ERGOCALCIFEROL) 1.25 MG (94299 UT) CAPS capsule Take 50,000 Units by mouth once a week *MONDAYS*   Yes Historical Provider, MD   omega-3 acid ethyl esters (LOVAZA) 1 g capsule Take 2 capsules by mouth 2 times daily 9/1/20  Yes Marcella Packer DO   spironolactone (ALDACTONE) 25 MG tablet Take 1 tablet by mouth daily 8/27/20  Yes Jennifer Alvarez,        Current Medications:  Current Facility-Administered Medications   Medication Dose Route Frequency Provider Last Rate Last Admin    magnesium sulfate 2000 mg in 50 mL IVPB premix  2,000 mg IntraVENous Once Mattie Blake MD        ipratropium-albuterol (DUONEB) nebulizer solution 1 ampule  1 ampule Inhalation Q6H Silvano Moreno MD   1 ampule at 03/17/22 0748    Arformoterol Tartrate (BROVANA) nebulizer solution 15 mcg  15 mcg Nebulization BID Dylan Lafleur MD   15 mcg at 03/17/22 0749    budesonide (PULMICORT) nebulizer suspension 500 mcg  0.5 mg Nebulization BID Dylan Lafleur MD   500 mcg at 03/17/22 0749    ipratropium-albuterol (DUONEB) nebulizer solution 1 ampule  1 ampule Inhalation Q4H PRN Dylan Lafleur MD   1 ampule at 03/16/22 1011    bumetanide (BUMEX) injection 1 mg  1 mg IntraVENous BID Dylan Lafleur MD   1 mg at 03/17/22 0835    diazePAM (VALIUM) tablet 5 mg  5 mg Oral Q6H Dylan Lafleur MD   5 mg at 03/16/22 1708    oxyCODONE (ROXICODONE) 5 MG/5ML solution 10 mg  10 mg Oral Q6H Dylan Lafleur MD   10 mg at 03/17/22 0806    metoprolol tartrate (LOPRESSOR) tablet 25 mg  25 mg Oral BID Jann Tucker, DO   25 mg at 03/17/22 5733    amiodarone (CORDARONE) 450 mg in dextrose 5 % 250 mL infusion  0.5 mg/min IntraVENous Continuous Ruma Jhon, DO 16.7 mL/hr at 03/17/22 0903 0.5 mg/min at 03/17/22 0903    phenylephrine (DARLYN-SYNEPHRINE) 50 mg in dextrose 5 % 250 mL infusion   mcg/min IntraVENous Continuous Jann Tucker, DO        dexmedetomidine (PRECEDEX) 400 mcg in sodium chloride 0.9 % 100 mL infusion  0.1-1.5 mcg/kg/hr IntraVENous Continuous Jann Tucker, DO 52.43 mL/hr at 03/17/22 0903 1.4 mcg/kg/hr at 03/17/22 0903    insulin glargine (LANTUS) injection vial 30 Units  30 Units SubCUTAneous BID Paulo Langston, DO   30 Units at 03/17/22 0805    lidocaine PF 1 % injection 5 mL  5 mL IntraDERmal Once Dylan Lafleur MD        sodium chloride flush 0.9 % injection 5-40 mL  5-40 mL IntraVENous 2 times per day Dylan Lafleur MD   10 mL at 03/17/22 0807    sodium chloride flush 0.9 % injection 5-40 mL  5-40 mL IntraVENous PRN yDlan Lafleur MD   10 mL at 03/15/22 2028    0.9 % sodium chloride infusion  25 mL IntraVENous PRREKHA Lafleur MD        heparin flush 100 UNIT/ML injection 300 Units  3 mL IntraVENous 2 times per day Tiago Dan MD        heparin flush 100 UNIT/ML injection 300 Units  3 mL IntraCATHeter PRN Tiago Dan MD        QUEtiapine (SEROQUEL) tablet 50 mg  50 mg Oral BID Wilfrido Langston, DO   50 mg at 03/17/22 8723    white petrolatum ointment   Topical BID PRN Wilfrido King Pettibone, DO        fentaNYL 5 mcg/ml in 0.9%  ml infusion   mcg/hr IntraVENous Continuous Anca Blankenship, DO 30 mL/hr at 03/17/22 0901 150 mcg/hr at 03/17/22 0901    hydrocortisone sodium succinate PF (SOLU-CORTEF) injection 25 mg  25 mg IntraVENous Q8H Gio Blankenship, DO   25 mg at 03/17/22 0553    fentaNYL (SUBLIMAZE) injection 25 mcg  25 mcg IntraVENous Q2H PRN Lulu Spap, DO        linezolid (ZYVOX) 100 MG/5ML suspension 600 mg  600 mg Per NG tube 2 times per day Byron Mcginnis MD   600 mg at 03/17/22 8488    mupirocin (BACTROBAN) 2 % ointment   Nasal BID Lulu Sapp, DO   Given at 03/17/22 0831    insulin lispro (HUMALOG) injection vial 0-18 Units  0-18 Units SubCUTAneous Q4H Lulu Sapp, DO   3 Units at 03/17/22 0806    docusate (COLACE) 50 MG/5ML liquid 100 mg  100 mg Per NG tube BID Anca Blankenship, DO   100 mg at 03/17/22 0806    senna (SENOKOT) 8.8 MG/5ML syrup 17.6 mg  10 mL Per NG tube BID Anca Blankenship, DO   17.6 mg at 03/17/22 9889    enoxaparin (LOVENOX) injection 135 mg  1 mg/kg SubCUTAneous BID Anca Blankenship, DO   135 mg at 03/17/22 0807    chlorhexidine (PERIDEX) 0.12 % solution 15 mL  15 mL Mouth/Throat BID Anca Blankenship, DO   15 mL at 03/17/22 0830    pantoprazole (PROTONIX) injection 40 mg  40 mg IntraVENous Daily Anca Blankenship, DO   40 mg at 03/17/22 3221    dextrose bolus (hypoglycemia) 10% 125 mL  125 mL IntraVENous PRN Pili Lugo,         Or    dextrose bolus (hypoglycemia) 10% 250 mL  250 mL IntraVENous PRN Macey Lau DO        glucose (GLUTOSE) 40 % oral gel 15 g  15 g Oral PRN Juan J Brock DO        glucagon (rDNA) injection 1 mg  1 mg IntraMUSCular PRN Ricke Pace Tofil [place], DO        dextrose 5 % solution  100 mL/hr IntraVENous PRN Ricke Pace Tofil [place], DO        midazolam PF (VERSED) injection 2 mg  2 mg IntraVENous Q3H PRN Lulu Sapp, DO   2 mg at 03/14/22 0350    midazolam (VERSED) 1 mg/mL in D5W infusion  1-10 mg/hr IntraVENous Continuous W[place]ita Kamar Mcdanielon, DO 6 mL/hr at 03/17/22 0907 6 mg/hr at 03/17/22 9382    acetaminophen (TYLENOL) tablet 650 mg  650 mg Oral Q4H PRN Dioni Manzano MD   650 mg at 03/16/22 2105    acetaminophen (TYLENOL) suppository 650 mg  650 mg Rectal Q4H PRN Dioni Manzano MD        miconazole (MICOTIN) 2 % powder   Topical BID Dioni Manzano MD   Given at 03/17/22 0831    0.9 % sodium chloride infusion   IntraVENous Continuous Dioni Manzano MD 10 mL/hr at 03/17/22 0903 Rate Verify at 03/17/22 0903      amiodarone 0.5 mg/min (03/17/22 0903)    phenylephrine (DARYLN-SYNEPHRINE) 50mg/250mL infusion      dexmedetomidine (PRECEDEX) IV infusion 1.4 mcg/kg/hr (03/17/22 0903)    sodium chloride      fentaNYL 5 mcg/ml in 0.9%  ml infusion 150 mcg/hr (03/17/22 0901)    dextrose      midazolam 6 mg/hr (03/17/22 0907)    sodium chloride 10 mL/hr at 03/17/22 0903         Physical Exam:  /66   Pulse 84   Temp 100.3 °F (37.9 °C) (Bladder)   Resp 24   Ht 5' 4\" (1.626 m)   Wt (!) 330 lb 3.2 oz (149.8 kg)   LMP  (LMP Unknown)   SpO2 (!) 89%   BMI 56.68 kg/m²   Weight change: Wt Readings from Last 3 Encounters:   03/14/22 (!) 330 lb 3.2 oz (149.8 kg)   08/31/21 (!) 304 lb (137.9 kg)   08/19/21 (!) 305 lb 5.4 oz (138.5 kg)     The patient is awake, alert and moderately agitated but otherwise in no discomfort or distress. No gross musculoskeletal deformity or lymphadenopathy are present. No significant skin or nail changes are present. Gross examination of head, eyes, nose and throat are negative.  Jugular venous pressure is normal and no carotid bruits are present. No thyromegaly is noted. Normal respiratory effort is noted with no accessory muscle usage present. Lung fields are clear to ascultation. Cardiac examination is notable for a regular rate and rhythm with no palpable thrill. No gallop rhythm or cardiac murmur are identified. A benign abdominal examination is present with the exception of obesity and no masses or organomegaly. Adequate palpation of her abdominal aortic pulsation is not possible. Intact pulses are present throughout upper extremities and anasarca with persistent lymph edema and chronic skin changes is present. No focal neurologic deficits are present. Intake/Output:    Intake/Output Summary (Last 24 hours) at 3/17/2022 0912  Last data filed at 3/17/2022 0903  Gross per 24 hour   Intake 3796.95 ml   Output 4995 ml   Net -1198.05 ml     I/O this shift:  In: 222.9 [I.V.:222.9]  Out: -     Laboratory Tests:  Lab Results   Component Value Date    CREATININE 1.0 03/17/2022    BUN 23 03/17/2022     03/17/2022    K 4.5 03/17/2022     03/17/2022    CO2 34 (H) 03/17/2022     No results for input(s): CKTOTAL, CKMB in the last 72 hours.     Invalid input(s): TROPONONI  No results found for: BNP  Lab Results   Component Value Date    WBC 7.9 03/17/2022    RBC 3.19 03/17/2022    HGB 7.9 03/17/2022    HCT 27.3 03/17/2022    MCV 85.6 03/17/2022    MCH 24.8 03/17/2022    MCHC 28.9 03/17/2022    RDW 16.3 03/17/2022     03/17/2022    MPV 10.8 03/17/2022     Recent Labs     03/15/22  0530 03/16/22  0510 03/17/22  0500   ALKPHOS 123* 130* 112*   * 83* 55*   AST 29 22 17   PROT 6.1* 6.5 6.2*   BILITOT 0.4 0.3 0.3   LABALBU 2.6* 2.8* 2.7*     Lab Results   Component Value Date    MG 1.9 03/17/2022     Lab Results   Component Value Date    PROTIME 19.5 04/29/2021    INR 1.8 04/29/2021     Lab Results   Component Value Date    TSH 1.890 08/18/2021     No components found for: CHLPL  Lab Results   Component Value Date    TRIG 298 (H) 01/16/2021    TRIG 512 (H) 05/26/2020    TRIG 861 (H) 11/07/2019     Lab Results   Component Value Date    HDL 52 01/16/2021    HDL 33 05/26/2020    HDL 28 11/07/2019     Lab Results   Component Value Date    LDLCALC 243 (H) 01/16/2021    LDLCALC - (AA) 05/26/2020    LDLCALC - (AA) 11/07/2019         Cardiac Tests:  ECG: An initial electrocardiogram reviewed at the time of evaluation demonstrated evidence sinus tachycardia with left posterior fascicular block and right bundle branch block conduction patterns with a repeat tracing during the time of her acute event demonstrating atrial fibrillation with similar findings associated with diffuse ST depression and a repeat tracing following conversion pending  Telemetry findings reviewed: sinus rhythm with earlier paroxysmal atrial fibrillation, no new tachy/bradyarrhythmias overnight  Chest X-ray: Most recent chest x-ray reviewed time evaluation demonstrates poor inspiratory effort with possible cardiomegaly and a potential developing right-sided infiltrate  Last Echocardiogram: An echocardiogram of April, 2021 demonstrates evidence of a normal size left ventricular chamber with mild concentric left ventricular hypertrophy and no evidence of regional wall motion apparatus with normal left ventricular systolic function and biatrial enlargement. Right ventricular dilatation in the absence of right ventricular dysfunction was present with no significant valvular pathology and moderate pulmonary hypertension with right ventricular systolic pressures of approximately 70 mmHg      ASSESSMENT / PLAN: On a clinical basis, the patient presents with evidence of an acute illness inclusive of acute respiratory failure and sepsis with the latter presently resolved.   During the course of hospitalization, her rate control therapy had been interrupted and at the time of resumption remains significantly below that of her baseline dosage with all of these factors contributing to her recurrent atrial arrhythmias in addition to that of the increased stresses of attempted weaning. At the time of her arrhythmias significant ST depression with elevation of high-sensitivity troponin levels were noted most likely indicative of a type II non-ST elevation ischemic event on the basis of increased myocardial oxygen demands with a follow-up electrocardiogram and troponin level pending. Based on these results, a potential repeat limited echocardiogram may be beneficial.  Presently modification of her beta-blocker dosage will be attempted as hemodynamically tolerated with plans to maintain amiodarone at least over the next 24 hours and attempt to maintain sinus rhythm. Ongoing gradual fluid mobilization will be necessary with nutritional support essential to fluid mobilization in reducing risk of progressive debilitation. Additional management will largely be deferred to the pulmonary/critical care service. On a long-term basis, ongoing appropriate lifestyle modification to achieve weight reduction will be essential to diastolic cardiac performance in addition to that of the appropriate needs of assessment of obstructive sleep apnea initiation of therapy to reduce risk of adverse cardiovascular effects both related to that of diastolic heart failure and recurrent atrial arrhythmias. Ongoing aggressive risk factor modification of blood pressure, diabetes and serum lipids will be essential to reducing risk of future atherosclerotic development. Thank you for allowing me to participate in your patient's care. Please feel free to contact me if you have any questions or concerns. Note: This report was completed using computerized voice recognition software. Every effort has been made to ensure accuracy, however; inadvertent computerized transcription errors may be present. Alex Lackey.  Rosa Ramirez, [address] [place] [address] Cardiology

## 2022-03-17 NOTE — PROGRESS NOTES
Pulmonary Subsequent Hospital F/U note    Patient is being followed for: acute on chronic hypoxic and hypercapnic respiratory failure     Interval HPI:  Sedation off  Tolerated volume support today  Extubated to AVAPS - cycle on and off today. Appeared well after extubation  Discussed with family in the room  +fever  Cardioversion yesterday - remains on amiodarone    ROS:  Unable to obtain       Exam:  BP (!) 185/73   Pulse 89   Temp 101.1 °F (38.4 °C) (Bladder)   Resp (!) 48   Ht 5' 4\" (1.626 m)   Wt (!) 330 lb 3.2 oz (149.8 kg)   LMP  (LMP Unknown)   SpO2 97%   BMI 56.68 kg/m²    General: Patient is calm and following commands   HEENT: PERRL, EOMI, NIV mask in place   Neck: supple no adenopathy  CV: RRR without murmur  Lungs: decreased BS diffusely  Abd: soft, ND, NT, bowel sounds normal  Ext: warm, extensive chronic lymphedema of the legs     Data:    Oximetry:  SpO2 Readings from Last 1 Encounters:   03/17/22 97%       Imaging personally reviewed by myself:  CXR     Impression   Questionable subtle worsening of right-sided infiltrates.  No other   significant change.  Continued follow-up recommended.                 Pertinent labs reviewed and noted:  Lab Results   Component Value Date    WBC 7.9 03/17/2022    HGB 7.9 03/17/2022    HCT 27.3 03/17/2022    MCV 85.6 03/17/2022    MCH 24.8 03/17/2022    MCHC 28.9 03/17/2022    RDW 16.3 03/17/2022     03/17/2022    MPV 10.8 03/17/2022     Lab Results   Component Value Date     03/17/2022    K 4.5 03/17/2022    K 3.6 03/16/2022     03/17/2022    CO2 34 03/17/2022    BUN 23 03/17/2022    CREATININE 1.0 03/17/2022    LABALBU 2.7 03/17/2022    LABALBU 4.5 11/02/2011    CALCIUM 7.7 03/17/2022    GFRAA >60 03/17/2022    LABGLOM 55 03/17/2022     Lab Results   Component Value Date    PROTIME 19.5 04/29/2021    INR 1.8 04/29/2021       Assessment:  1. Acute on chronic hypoxic and hypercapnic respiratory failure  2. Atelectasis  3.  Group G strep bacteremia  4. Fevers  5. Cellulitis R lower extremity  6. Morbid obesity BMI 56  7. Lymphedema  8. Septic shock, resolved  9. Atrial fibrillation   10. HFpEF  11. Pulmonary hypertension  12. COPD    Plan:  1. Extubated to AVAPS today - cycle on and off. Definitely needs to wear this nocturnally moving forward. 2. IV sedatives stopped   3. Abx per ID  4. Continue diuresis   5. Would stop solucortef  6.  Monitor in ICU overnight    Discussed with Dr. James Mcgraw and family     Electronically signed by Ami Escamilla MD on 3/17/2022 at 2:31 PM

## 2022-03-17 NOTE — PLAN OF CARE
Problem: Non-Violent Restraints  Goal: Removal from restraints as soon as assessed to be safe  Outcome: Not Met This Shift  Goal: No harm/injury to patient while restraints in use  Outcome: Met This Shift  Goal: Patient's dignity will be maintained  Outcome: Met This Shift     Problem: Discharge Planning:  Goal: Participates in care planning  Description: Participates in care planning  Outcome: Not Met This Shift  Goal: Discharged to appropriate level of care  Description: Discharged to appropriate level of care  Outcome: Not Met This Shift     Problem: Airway Clearance - Ineffective:  Goal: Ability to maintain a clear airway will improve  Description: Ability to maintain a clear airway will improve  Outcome: Ongoing     Problem: Anxiety/Stress:  Goal: Level of anxiety will decrease  Description: Level of anxiety will decrease  Outcome: Ongoing     Problem: Cardiac Output - Decreased:  Goal: Hemodynamic stability will improve  Description: Hemodynamic stability will improve  Outcome: Ongoing     Problem: Fluid Volume - Imbalance:  Goal: Absence of imbalanced fluid volume signs and symptoms  Description: Absence of imbalanced fluid volume signs and symptoms  Outcome: Ongoing     Problem: Gas Exchange - Impaired:  Goal: Levels of oxygenation will improve  Description: Levels of oxygenation will improve  Outcome: Ongoing     Problem: Nutrition Deficit:  Goal: Ability to achieve adequate nutritional intake will improve  Description: Ability to achieve adequate nutritional intake will improve  Outcome: Ongoing     Problem: Pain:  Goal: Pain level will decrease  Description: Pain level will decrease  Outcome: Ongoing  Goal: Recognizes and communicates pain  Description: Recognizes and communicates pain  Outcome: Ongoing  Goal: Control of acute pain  Description: Control of acute pain  Outcome: Ongoing  Goal: Control of chronic pain  Description: Control of chronic pain  Outcome: Ongoing     Problem: Serum Glucose Level - Abnormal:  Goal: Ability to maintain appropriate glucose levels will improve to within specified parameters  Description: Ability to maintain appropriate glucose levels will improve to within specified parameters  Outcome: Ongoing     Problem: Skin Integrity - Impaired:  Goal: Will show no infection signs and symptoms  Description: Will show no infection signs and symptoms  Outcome: Ongoing  Goal: Absence of new skin breakdown  Description: Absence of new skin breakdown  Outcome: Ongoing     Problem: Sleep Pattern Disturbance:  Goal: Appears well-rested  Description: Appears well-rested  Outcome: Ongoing     Problem: Tissue Perfusion, Altered:  Goal: Circulatory function within specified parameters  Description: Circulatory function within specified parameters  Outcome: Ongoing     Problem: Skin Integrity:  Goal: Will show no infection signs and symptoms  Description: Will show no infection signs and symptoms  Outcome: Ongoing  Goal: Absence of new skin breakdown  Description: Absence of new skin breakdown  Outcome: Met This Shift     Problem: Falls - Risk of:  Goal: Will remain free from falls  Description: Will remain free from falls  Outcome: Met This Shift  Goal: Absence of physical injury  Description: Absence of physical injury  Outcome: Met This Shift

## 2022-03-17 NOTE — PROGRESS NOTES
Assessed patient for PICC placement. Pt has one vein on right arm that measures 0.47 cm that would only accommodate a single lumen PICC. Pt has vein on left arm that would be large enough for double lumen but due to arm swelling the vein is too deep at the appropriate placement area. In [facility] area it is approximately 2 cm deep and ok for access but the PICC catheters are only 55 cm long and it will not reach to the SVC site for correct placement. And if we moved up the arm, it becomes inaccessible (too deep) with our access needles. Monika Nash RN. Patient's nurse, notified.

## 2022-03-17 NOTE — PROGRESS NOTES
Critical Care Team - Daily Progress Note         Date and time: 3/17/2022 8:55 AM  Patient's name:  [place] AG[place] [address] Record Number: [id number]  Patient's account/billing number: [de-identified]  Patient's YOB: 1955   Age: 74 y.o. Date of Admission: 3/8/2022  4:28 AM  Length of stay during current admission: 9      Primary Care Physician: Emily Avendaño DO  ICU Attending Physician: Dr. Ygnacio Mcburney Status: Full Code    Reason for ICU admission: Respiratory failure, shock      SUBJECTIVE:     OVERNIGHT EVENTS:    3/17: Vent day 10. Patient failed weaning trial again yesterday. Patient experienced episode of tachycardia that was found to be A. fib with RVR. Patient was cardioverted on 3/16. Patient was also started on amiodarone drip. This morning she is in sinus rhythm. Patient was agitated overnight. Patient's agitation resolved with oxycodone administration. Patient remains on Versed, fentanyl, Precedex. She is still on Seroquel. Tube feeds are still running at this time. 3/16: Vent day 9, patient attempted weaning trial yesterday without success. Patient with agitation reported over night Remains on Versed 5 mg , Fentanyl 200 mcg Precedex 1.5 mcg. On Seroquel. Tube feeds running at 50.     3/15: Vent day #8. Patient was still agitated overnight. Patient was unable to be weaned off vent yesterday. We will likely try again today. She remains on Versed, fentanyl, Precedex. This morning she did experience a drop in her blood sugars, likely due to the decrease in steroids. Tube feeds are still running at this time. Lantus will be adjusted today. 3/14: Vent day #7. Overnight patient was extremely agitated. Patient pulled art line. Patient had to be sedated because she kept trying to pull out her ETT. She is currently on versed, fentanyl and precedex. Patient tolerated Seroquel well. 3/13: Patient did not experience any acute events overnight.  She has been very agitated however and trying to pull at tube. She is still on pressors at this time. 3/12: Pressors decreased. Mentation has not been appropriate yet. Tolerating tube feeds. 3/11: Patient had no acute problems overnight. She is currently being weaned off of vaso and weaned off sedation. She has been persistently hyperglycemic. 3/10: Pressors were changed from levo to rachelle. She would awake intermittently and be agitated. Precedex was started. Patient is tolerating tube feeds as well. 3/9: Patient has converted to sinus rhythm, opening eyes with spells of anxious awakenings. Levophed stopped. Remains on Vent. 3/8: Patient presented to the ICU with hypotension and respiratory failure. Patient was started on pressors and antibiotics at that time. Patient was also started on amiodarone drip for her A. fib. Patient was also started on insulin drip.     CURRENT VENTILATION STATUS:     [x] Ventilator  [] BIPAP  [] Nasal Cannula [] Room Air      IF INTUBATED, ET TUBE MARKING AT LOWER LIP:  cms    SECRETIONS  Amount:  [x] Small [] Moderate  [] Large  [] None  Color:     [] White [] Colored  [] Bloody    SEDATION:  RAAS Score:  [] Propofol gtt  [x] Versed gtt  [x] Fentanyl gtt [] Ativan gtt   [x] Precedex    PARALYZED:  [x] No    [] Yes      VASOPRESSORS:  [x] No    [] Yes    If yes -   [] Levophed       [] Dopamine     [] Vasopressin       [] Dobutamine  [] Phenylephrine         [] Epinephrine     CENTRAL LINES:     [] No   [x] Yes   (Date of Insertion: 3/8)           If yes -     [] Right IJ     [x] Left IJ [] Right Femoral [] Left Femoral                   [] Right Subclavian [] Left Subclavian       BRAUN'S CATHETER:   [] No   [x] Yes  (Date of Insertion: )     URINE OUTPUT:            [x] Good   [] Low              [] Anuric      OBJECTIVE:     VITAL SIGNS:  /64   Pulse 89   Temp 100.2 °F (37.9 °C) (Bladder)   Resp 28   Ht 5' 4\" (1.626 m)   Wt (!) 330 lb 3.2 oz (149.8 kg)   LMP  (LMP Unknown)   SpO2 92%   BMI 56.68 kg/m²   Tmax over 24 hours:  Temp (24hrs), Av.8 °F (38.2 °C), Min:100.2 °F (37.9 °C), Max:101.8 °F (38.8 °C)      Patient Vitals for the past 6 hrs:   BP Temp Temp src Pulse Resp SpO2   22 0824 -- -- -- 89 28 92 %   22 0758 -- -- -- -- 26 91 %   22 0757 -- -- -- -- 25 91 %   22 0756 -- -- -- 59 27 92 %   22 0742 -- -- -- 54 24 93 %   22 0600 122/64 -- -- 67 22 93 %   22 0500 -- -- -- 66 28 91 %   22 0402 -- -- -- 68 23 92 %   22 0400 124/70 100.2 °F (37.9 °C) Bladder 70 24 92 %   22 0300 139/67 -- -- 62 18 93 %         Intake/Output Summary (Last 24 hours) at 3/17/2022 0855  Last data filed at 3/17/2022 0600  Gross per 24 hour   Intake 3574.02 ml   Output 4995 ml   Net -1420.98 ml     Wt Readings from Last 2 Encounters:   22 (!) 330 lb 3.2 oz (149.8 kg)   21 (!) 304 lb (137.9 kg)     Body mass index is 56.68 kg/m². PHYSICAL EXAMINATION:    Physical Exam  Constitutional:       Appearance: She is morbidly obese. She is ill-appearing. Interventions: She is sedated and intubated. HENT:      Head: Normocephalic and atraumatic. Eyes:      Conjunctiva/sclera: Conjunctivae normal.   Neck:      Trachea: No tracheal deviation. Cardiovascular:      Rate and Rhythm: Normal rate and regular rhythm. Heart sounds: Normal heart sounds. Pulmonary:      Effort: Pulmonary effort is normal. She is intubated. Breath sounds: Decreased breath sounds present. Abdominal:      General: Bowel sounds are normal.      Palpations: Abdomen is soft. Tenderness: There is no abdominal tenderness. Musculoskeletal:      Cervical back: Neck supple. Right lower leg: Edema present. Left lower leg: Edema present. Lymphadenopathy:      Cervical: No cervical adenopathy. Skin:     General: Skin is warm and dry. Findings: No rash.       Comments: +bl lower extremity lymphedema chronic skin changes with right leg fissure erythma   Neurological:      General: No focal deficit present.    Psychiatric:         Behavior: Behavior normal.      Any additional physical findings: n/a    MEDICATIONS:    Scheduled Meds:   ipratropium-albuterol  1 ampule Inhalation Q6H WA    Arformoterol Tartrate  15 mcg Nebulization BID    budesonide  0.5 mg Nebulization BID    bumetanide  1 mg IntraVENous BID    diazePAM  5 mg Oral Q6H    oxyCODONE  10 mg Oral Q6H    metoprolol tartrate  25 mg Oral BID    insulin glargine  30 Units SubCUTAneous BID    lidocaine PF  5 mL IntraDERmal Once    sodium chloride flush  5-40 mL IntraVENous 2 times per day    heparin flush  3 mL IntraVENous 2 times per day    QUEtiapine  50 mg Oral BID    hydrocortisone sodium succinate PF  25 mg IntraVENous Q8H    linezolid  600 mg Per NG tube 2 times per day    mupirocin   Nasal BID    insulin lispro  0-18 Units SubCUTAneous Q4H    docusate  100 mg Per NG tube BID    senna  10 mL Per NG tube BID    enoxaparin  1 mg/kg SubCUTAneous BID    chlorhexidine  15 mL Mouth/Throat BID    pantoprazole  40 mg IntraVENous Daily    miconazole   Topical BID     Continuous Infusions:   amiodarone 0.5 mg/min (03/17/22 0600)    phenylephrine (DARLYN-SYNEPHRINE) 50mg/250mL infusion      dexmedetomidine (PRECEDEX) IV infusion 1.2 mcg/kg/hr (03/17/22 0830)    sodium chloride      fentaNYL 5 mcg/ml in 0.9%  ml infusion 175 mcg/hr (03/17/22 0827)    dextrose      midazolam 7 mg/hr (03/17/22 0758)    sodium chloride 10 mL/hr at 03/17/22 0600     PRN Meds:   ipratropium-albuterol, 1 ampule, Q4H PRN  sodium chloride flush, 5-40 mL, PRN  sodium chloride, 25 mL, PRN  heparin flush, 3 mL, PRN  white petrolatum, , BID PRN  fentanNYL, 25 mcg, Q2H PRN  dextrose bolus (hypoglycemia), 125 mL, PRN   Or  dextrose bolus (hypoglycemia), 250 mL, PRN  glucose, 15 g, PRN  glucagon (rDNA), 1 mg, PRN  dextrose, 100 mL/hr, PRN  midazolam, 2 mg, Q3H PRN  acetaminophen, 650 mg, Q4H PRN  acetaminophen, 650 mg, Q4H PRN          VENT SETTINGS (Comprehensive) (if applicable):  Vent Information  $Ventilation: $Subsequent Day  Skin Assessment: Clean, dry, & intact  Equipment ID: [id number]  Vent Type: [phone]  Vent Mode: (S) AC/PC  Vt Ordered: 0 mL  Pressure Ordered: (S) 30  Rate Set: 14 bmp  Peak Flow: 0 L/min  Pressure Support: 0 cmH20  FiO2 : 30 %  SpO2: 92 %  SpO2/FiO2 ratio: 306.67  Sensitivity: 2  PEEP/CPAP: 5  I Time/ I Time %: (S) 0.6 s  Humidification Source: Heated wire  Humidification Temp: 37  Humidification Temp Measured: 37.1  Circuit Condensation: Drained  Additional Respiratory  Assessments  Pulse: 89  Resp: 28  SpO2: 92 %  Position: Semi-Chapin's  Humidification Source: Heated wire  Humidification Temp: 37  Circuit Condensation: Drained  Oral Care: Mouth suctioned,Mouth swabbed,Mouth moisturizer,Lip moisturizer applied  Subglottic Suction Done?: Yes  Cuff Pressure (cm H2O): 29 cm H2O    ABGs:   Recent Labs     03/17/22 0530   PH 7.466*   PCO2 46.6*   PO2 64.6*   HCO3 32.8*   BE 8.2*   O2SAT 92.1       Laboratory findings:    Complete Blood Count:   Recent Labs     03/15/22  0530 03/16/22  0510 03/17/22  0500   WBC 9.9 9.1 7.9   HGB 8.0* 8.0* 7.9*   HCT 27.3* 27.7* 27.3*    248 263        Last 3 Blood Glucose:   Recent Labs     03/16/22  0510 03/16/22 1730 03/17/22  0500   GLUCOSE 168* 220* 194*        PT/INR:    Lab Results   Component Value Date    PROTIME 19.5 04/29/2021    INR 1.8 04/29/2021     PTT:    Lab Results   Component Value Date    APTT 36.3 04/29/2021       Comprehensive Metabolic Profile:   Recent Labs     03/16/22  0510 03/16/22  0510 03/16/22  1730 03/17/22  0500     --  147* 145   K 4.0  --  3.6 4.5     --  104 104   CO2 32*  --  33* 34*   BUN 20  --  20 23   CREATININE 0.8  --  0.9 1.0   GLUCOSE 168*  --  220* 194*   CALCIUM 8.2*  --  7.7* 7.7*   PROT 6.5   < >  --  6.2*   LABALBU 2.8*   < >  --  2.7*   BILITOT 0.3 < >  --  0.3   ALKPHOS 130*   < >  --  112*   AST 22   < >  --  17   ALT 83*   < >  --  55*    < > = values in this interval not displayed. Magnesium:   Lab Results   Component Value Date    MG 1.9 03/17/2022     Phosphorus:   Lab Results   Component Value Date    PHOS 4.6 03/17/2022     Ionized Calcium:   Lab Results   Component Value Date    CAION 1.37 10/24/2016        Urinalysis: No results found for: UA     Troponin: No results for input(s): TROPONINI in the last 72 hours. Microbiology:   Culture, Blood 2   Date Value Ref Range Status   03/15/2022 24 Hours no growth  Preliminary       Cultures during this admission:     Blood cultures:  [] None drawn      [x] Negative             [x]  Positive (Details: 3/8 beta strep group G, repeats 3/9 negative)  Urine Culture:   [] None drawn      [x] Negative             []  Positive (Details:  )  Sputum Culture:  [x] None drawn      [] Negative             []  Positive (Details:  )   Wound Culture:  [] None drawn      [] Negative             [x]  Positive (Details: S. Aureus, Proteus sp 3/8  )     Other pertinent Labs:        Radiology/Imaging:     Chest Xray (3/17/2022): Questionable subtle worsening of right-sided infiltrates.  No other   significant change.  Continued follow-up recommended. ASSESSMENT:     Principal Problem:    Sepsis ([address]  Resolved Problems:    * No resolved hospital problems. *      PLAN:     WEAN PER PROTOCOL:  [] No   [x] Yes  [] N/A    DISCONTINUE ANY LABS:   [x] No   [] Yes    ICU PROPHYLAXIS:  Stress ulcer:  [x] PPI Agent  [] [id number] [] Sucralfate  [] Other:  VTE:   [] Enoxaparin  [] Unfract.  Heparin Subcut  [] EPC Cuffs    NUTRITION:  [x] NPO [x] Tube Feeding (Specify: ) [] TPN  [] PO (Diet: Diet NPO  ADULT TUBE FEEDING; Nasogastric; Diabetic; Continuous; 10; Yes; 15; Q 8 hours; 50; 75; Q 4 hours)    HOME MEDICATIONS RECONCILED: [] No  [x] Yes    INSULIN DRIP:   [x] No   [] Yes    CONSULTATION NEEDED:  [x] No   [] Yes    FAMILY UPDATED:    [] No   [x] Yes    TRANSFER OUT OF ICU:   [x] No   [] Yes    SYSTEMS ASSESSMENT    Neuro     Intubated, sedated  Fentanyl, Versed, Precedex  Agitated at times    Agitation   Seroquel 50 mg BID  Precedex continued  Valium 5 mg every 6 hours  Oxycodone 10 mg every 6 hours    Respiratory     Metabolic Alkalosis with Respiratory Acidosis  Intubated Day 10  AC/PC, FiO2 30, rate 14, PEEP 5   Was unable to be extubated yesterday  Wean oxygen as tolerated.  Keep O2 sat 90-92%  Pressure support as tolerated   Pulmonology following, appreciate input    Cardiovascular     Paroxysmal atrial fibrillation  Amnio drip  Continue to monitor  Therapeutic Lovenox  S/p Cardioversion 3/16    Septic shock  Solu-Cortef 25 mg Q8H  Blood cultures positive Beta strep group G 3/8, repeats negative  Wound culture 3/8 Staph aureus and Proteus sp  Patient remains febrile  IV NS 20 mL/hr  See infectious disease below    Fluid overload  Bumex 1 mg twice daily  Strict I's and O's  Continue to monitor    Gastrointestinal     Possible Cholecystitis  Ultrasound 3/11 gallstones and edematous gallbladder wall  Consider surgery consult    GI PPx  Protonix 40 mg IV daily    Bowel Regimen  Colace 100 mg BID  Senna daily    Renal     CKD stage III, stable  Baseline creatinine 1.1-1.2  Continue to monitor    Electrolyte abnormalities  Replete as needed  Continue to monitor     Infectious Disease     Group G Streptococcus Septicemia  Likely 2/2 to cellulitis of leg (however, cultures grew Proteus)  Blood cultures positive x2 for beta strep group G  S/P Ceftriaxone 2g Q24H (completed 7 days with 1 day of cefepime prior)  Linezolid 600 mg Q12H (Day 6)  Remains febrile  Off pressors  ID following, appreciate input    Leukocytosis, resolved  Likely secondary to infection and steroid use  Remains febrile  Continue to monitor    Febrile  Tylenol suppository every 4 hours as needed for fever    Hematology/Oncology     Anticoagulation  Lovenox 135 mg twice daily      Chronic Normocytic Anemia  Baseline Hgb 9  Continue to monitor  Transfuse for Hgb <7    Endocrine     T2DM  On  Lantus to 30 units twice daily d/t hypoglycemia  HDSSI  Hypoglycemia protocol  POC glucose checks    Social/Spiritual/DNR/Other     Full code    Rivka Nunez DO, PGY-1            3/17/2022, 8:55 AM    Attending Physician Attestation: Dr. Carmela Stern    Thank you very much for allowing me to see this patient in consultation and follow up. I personally saw, examined and provided care for the patient. Radiographs, labs and medication list were reviewed by me independently. I spoke with bedside nursing, respiratory therapists and consultants. Critical care services and times documented are independent of procedures and multidisciplinary rounds with Residents. Additionally comprehensive, multidisciplinary rounds were conducted with the MICU team. The case was discussed in detail and plans for care were established. Review of Residents documentation was conducted and revisions were made as appropriate. I agree with the the above documented information.      Physical Examination:  Vitals:   Vitals:    03/17/22 1300 03/17/22 1320 03/17/22 1339 03/17/22 1400   BP: (!) 146/69   (!) 185/73   Pulse: 73   89   Resp: 24 24 (!) 36 (!) 48   Temp:       TempSrc:       SpO2: 93% 90%  97%   Weight:       Height:          General: No Acute Distress, on vent, agitated   HEENT: normocephalic, atruamatic  CVS: RRR, S1, S2, No S3 or S4  Respiratory: decreased breath sounds at lung bases, no focal wheezes noted  Extremities: no clubbing/cyanosis/edema  Neuro: intubated      ASSESSMENT:  · Severe Sepsis with Septic Shock - lower extremity cellulitis, Grp G Streptococcus bacteremia/septicemia   · Probable cellulitis right lower extremity  · Group G Streptococcus septicemia probably associated to cellulitis cultures of the leg, however, grew Proteus  · Possible intra-abdominal infection.  Possible cholecystitis  · Acute Respiratory Failure - on ACVC mechanical ventilation    · Leukocytosis, improving  · Chronic Hypoxic Respiratory Failure - baseline 6L O2 NC  · Moderate/Severe COPD  · Elevation of transaminases.  Possible cholecystitis.  They seem to be trending downward.  Ultrasound shows gallstones and edematous gallbladder wall  · Morbid Obesity - BMI 51  · Atrial Fibrillation with RVR - DCCV x 1 cardioversion 3/16/2022, cardizem and amiodarone given, with initiation of amiodarone drip    · Chronic Diastolic CHF Dysfunction   · Chronic Lymphedema of the Lower Extremities      In addition the following applies:     Check: N/A  Medication Alterations: lantus to 30 units BID, bumex 1 mg IV BID, duoneb, brovana, pulmicort  Procedures: N/A  Imaging: reviewed  New Consultations: N/A  VENT: ACPC (DAY 10) 14/35/40/5 to wean and extubation to AVAPS  Ppeak: 40  Pplateau: 30  [id number]: 71     Access:Left IJ TLC   Consults: ID, Dietary, Cardiology    Drips: Versed, Fentanyl, Precedex, Amiodarone   Nutrition: Tube Feeds  ABX: Linezolid  Completed ABX: Ceftriaxone     - unable to liberate from mechanical ventilation 3/14, 3/15, 3/16  - DCCV x 1 cardioversion 3/16/2022, cardizem and amiodarone given, with initiation of amiodarone drip    - liberation from vent to AVAPS on 3/17/2022, family present and updated at bedside  - 13L + at this time as of 3/16/2022, diuresis, supportive care  - patient already on therapeutic anticoagulation with lovenox subcutaneous   - wean off precedex while off mechanical ventilation  - OXY IR and Valium PO  - Dr. Isabella Bowen updated at bedside about condition and direction of care for their established office patient   - no stridor post-extubation, patient could phonate      Thank you for allowing me to participate in the care of this patient. Care reviewed with nursing staff, medical and surgical specialty care, primary care and the patient's family as available.  Restraints are ordered when the patient can do harm to him/herself by pulling out devices. Critical Care Time: > 35 minutes excluding procedures    Najma Heart M.D.     Najma Heart MD  3/17/2022  3:06 PM

## 2022-03-17 NOTE — PATIENT CARE CONFERENCE
Intensive Care Daily Quality Rounding Checklist        ICU Team Members:  Dr. Deepika Siu, Dr. Shira Cho (resident), charge nurse, bedside nurse, clinical pharmacist, respiratory therapist    ICU Day #: NUMBER: 10     Intubation Date: March 8     Ventilator Day #: NUMBER: 10     Central Line Insertion Date: March 8                                                    Day #: NUMBER: 10      Arterial Line Insertion Date:  n/a                             Day #: n/a     Temporary Hemodialysis Catheter Insertion Date:  n/a                             Day # n/a     DVT Prophylaxis: Lovenox    GI Prophylaxis: Protonix     Hansen Catheter Insertion Date: March 8                                        Day #: 10                             Continued need (if yes, reason documented and discussed with physician): yes, accurate I & O     Skin Issues/ Wounds and ordered treatment discussed on rounds: yes, wound care following     Goals/ Plans for the Day: Daily labs & abgs, soft wrist restraints to prevent pulling tubes, cont to attempt to wean vent & sedation, PICC line placement

## 2022-03-18 ENCOUNTER — APPOINTMENT (OUTPATIENT)
Dept: GENERAL RADIOLOGY | Age: 67
DRG: 870 | End: 2022-03-18
Payer: MEDICARE

## 2022-03-18 LAB
ALBUMIN SERPL-MCNC: 2.8 G/DL (ref 3.5–5.2)
ALP BLD-CCNC: 102 U/L (ref 35–104)
ALT SERPL-CCNC: 45 U/L (ref 0–32)
ANION GAP SERPL CALCULATED.3IONS-SCNC: 7 MMOL/L (ref 7–16)
ANISOCYTOSIS: ABNORMAL
AST SERPL-CCNC: 18 U/L (ref 0–31)
B.E.: 10.2 MMOL/L (ref -3–3)
BASOPHILS ABSOLUTE: 0.03 E9/L (ref 0–0.2)
BASOPHILS RELATIVE PERCENT: 0.3 % (ref 0–2)
BILIRUB SERPL-MCNC: 0.3 MG/DL (ref 0–1.2)
BUN BLDV-MCNC: 21 MG/DL (ref 6–23)
CALCIUM SERPL-MCNC: 7.8 MG/DL (ref 8.6–10.2)
CHLORIDE BLD-SCNC: 100 MMOL/L (ref 98–107)
CO2: 34 MMOL/L (ref 22–29)
COHB: 1.7 % (ref 0–1.5)
CREAT SERPL-MCNC: 0.7 MG/DL (ref 0.5–1)
CRITICAL: ABNORMAL
DATE ANALYZED: ABNORMAL
DATE OF COLLECTION: ABNORMAL
EOSINOPHILS ABSOLUTE: 0.3 E9/L (ref 0.05–0.5)
EOSINOPHILS RELATIVE PERCENT: 3.1 % (ref 0–6)
FIO2: 40 %
GFR AFRICAN AMERICAN: >60
GFR NON-AFRICAN AMERICAN: >60 ML/MIN/1.73
GLUCOSE BLD-MCNC: 134 MG/DL (ref 74–99)
HCO3: 34.8 MMOL/L (ref 22–26)
HCT VFR BLD CALC: 26.7 % (ref 34–48)
HEMOGLOBIN: 7.6 G/DL (ref 11.5–15.5)
HHB: 0.2 % (ref 0–5)
HYPOCHROMIA: ABNORMAL
IMMATURE GRANULOCYTES #: 0.06 E9/L
IMMATURE GRANULOCYTES %: 0.6 % (ref 0–5)
LAB: ABNORMAL
LACTIC ACID: 1 MMOL/L (ref 0.5–2.2)
LV EF: 65 %
LVEF MODALITY: NORMAL
LYMPHOCYTES ABSOLUTE: 1.7 E9/L (ref 1.5–4)
LYMPHOCYTES RELATIVE PERCENT: 17.7 % (ref 20–42)
Lab: ABNORMAL
MAGNESIUM: 2.2 MG/DL (ref 1.6–2.6)
MCH RBC QN AUTO: 24.8 PG (ref 26–35)
MCHC RBC AUTO-ENTMCNC: 28.5 % (ref 32–34.5)
MCV RBC AUTO: 87 FL (ref 80–99.9)
METER GLUCOSE: 136 MG/DL (ref 74–99)
METER GLUCOSE: 139 MG/DL (ref 74–99)
METER GLUCOSE: 147 MG/DL (ref 74–99)
METER GLUCOSE: 148 MG/DL (ref 74–99)
METER GLUCOSE: 199 MG/DL (ref 74–99)
METER GLUCOSE: 213 MG/DL (ref 74–99)
METHB: 0.3 % (ref 0–1.5)
MODE: ABNORMAL
MONOCYTES ABSOLUTE: 0.75 E9/L (ref 0.1–0.95)
MONOCYTES RELATIVE PERCENT: 7.8 % (ref 2–12)
NEUTROPHILS ABSOLUTE: 6.77 E9/L (ref 1.8–7.3)
NEUTROPHILS RELATIVE PERCENT: 70.5 % (ref 43–80)
O2 CONTENT: 11.8 ML/DL
O2 SATURATION: 99.8 % (ref 92–98.5)
O2HB: 97.8 % (ref 94–97)
OPERATOR ID: 7296
OVALOCYTES: ABNORMAL
PATIENT TEMP: 37 C
PCO2: 47.9 MMHG (ref 35–45)
PDW BLD-RTO: 16.4 FL (ref 11.5–15)
PEEP/CPAP: 5 CMH2O
PFO2: 5.19 MMHG/%
PH BLOOD GAS: 7.48 (ref 7.35–7.45)
PHOSPHORUS: 3.4 MG/DL (ref 2.5–4.5)
PLATELET # BLD: 276 E9/L (ref 130–450)
PMV BLD AUTO: 10.4 FL (ref 7–12)
PO2: 207.6 MMHG (ref 75–100)
POIKILOCYTES: ABNORMAL
POLYCHROMASIA: ABNORMAL
POTASSIUM SERPL-SCNC: 3.8 MMOL/L (ref 3.5–5)
RBC # BLD: 3.07 E12/L (ref 3.5–5.5)
RR MECHANICAL: 12 B/MIN
SODIUM BLD-SCNC: 141 MMOL/L (ref 132–146)
SOURCE, BLOOD GAS: ABNORMAL
THB: 8.2 G/DL (ref 11.5–16.5)
TIME ANALYZED: 608
TOTAL PROTEIN: 6.3 G/DL (ref 6.4–8.3)
VT MECHANICAL: 450 ML
WBC # BLD: 9.6 E9/L (ref 4.5–11.5)

## 2022-03-18 PROCEDURE — 36600 WITHDRAWAL OF ARTERIAL BLOOD: CPT

## 2022-03-18 PROCEDURE — C9113 INJ PANTOPRAZOLE SODIUM, VIA: HCPCS | Performed by: INTERNAL MEDICINE

## 2022-03-18 PROCEDURE — 36415 COLL VENOUS BLD VENIPUNCTURE: CPT

## 2022-03-18 PROCEDURE — 83735 ASSAY OF MAGNESIUM: CPT

## 2022-03-18 PROCEDURE — 94640 AIRWAY INHALATION TREATMENT: CPT

## 2022-03-18 PROCEDURE — 6370000000 HC RX 637 (ALT 250 FOR IP): Performed by: INTERNAL MEDICINE

## 2022-03-18 PROCEDURE — 02HV33Z INSERTION OF INFUSION DEVICE INTO SUPERIOR VENA CAVA, PERCUTANEOUS APPROACH: ICD-10-PCS | Performed by: INTERNAL MEDICINE

## 2022-03-18 PROCEDURE — 85025 COMPLETE CBC W/AUTO DIFF WBC: CPT

## 2022-03-18 PROCEDURE — 2580000003 HC RX 258

## 2022-03-18 PROCEDURE — 2000000000 HC ICU R&B

## 2022-03-18 PROCEDURE — 36410 VNPNXR 3YR/> PHY/QHP DX/THER: CPT

## 2022-03-18 PROCEDURE — 2500000003 HC RX 250 WO HCPCS

## 2022-03-18 PROCEDURE — 6360000002 HC RX W HCPCS: Performed by: INTERNAL MEDICINE

## 2022-03-18 PROCEDURE — 36592 COLLECT BLOOD FROM PICC: CPT

## 2022-03-18 PROCEDURE — 93308 TTE F-UP OR LMTD: CPT

## 2022-03-18 PROCEDURE — 76937 US GUIDE VASCULAR ACCESS: CPT

## 2022-03-18 PROCEDURE — 71045 X-RAY EXAM CHEST 1 VIEW: CPT

## 2022-03-18 PROCEDURE — 99233 SBSQ HOSP IP/OBS HIGH 50: CPT | Performed by: INTERNAL MEDICINE

## 2022-03-18 PROCEDURE — 84100 ASSAY OF PHOSPHORUS: CPT

## 2022-03-18 PROCEDURE — 6370000000 HC RX 637 (ALT 250 FOR IP): Performed by: SPECIALIST

## 2022-03-18 PROCEDURE — 82962 GLUCOSE BLOOD TEST: CPT

## 2022-03-18 PROCEDURE — 2500000003 HC RX 250 WO HCPCS: Performed by: INTERNAL MEDICINE

## 2022-03-18 PROCEDURE — 83605 ASSAY OF LACTIC ACID: CPT

## 2022-03-18 PROCEDURE — 2580000003 HC RX 258: Performed by: INTERNAL MEDICINE

## 2022-03-18 PROCEDURE — 82805 BLOOD GASES W/O2 SATURATION: CPT

## 2022-03-18 PROCEDURE — 6370000000 HC RX 637 (ALT 250 FOR IP): Performed by: STUDENT IN AN ORGANIZED HEALTH CARE EDUCATION/TRAINING PROGRAM

## 2022-03-18 PROCEDURE — 80053 COMPREHEN METABOLIC PANEL: CPT

## 2022-03-18 PROCEDURE — 94660 CPAP INITIATION&MGMT: CPT

## 2022-03-18 PROCEDURE — C1751 CATH, INF, PER/CENT/MIDLINE: HCPCS

## 2022-03-18 RX ORDER — BUMETANIDE 0.25 MG/ML
1 INJECTION, SOLUTION INTRAMUSCULAR; INTRAVENOUS 2 TIMES DAILY
Status: COMPLETED | OUTPATIENT
Start: 2022-03-18 | End: 2022-03-19

## 2022-03-18 RX ORDER — CEPHALEXIN 500 MG/1
1000 CAPSULE ORAL EVERY 8 HOURS SCHEDULED
Status: DISCONTINUED | OUTPATIENT
Start: 2022-03-18 | End: 2022-03-22

## 2022-03-18 RX ORDER — DOXYCYCLINE HYCLATE 100 MG/1
100 CAPSULE ORAL EVERY 12 HOURS SCHEDULED
Status: DISCONTINUED | OUTPATIENT
Start: 2022-03-18 | End: 2022-03-22

## 2022-03-18 RX ADMIN — DOCUSATE SODIUM LIQUID 100 MG: 100 LIQUID ORAL at 08:07

## 2022-03-18 RX ADMIN — CEPHALEXIN 1000 MG: 500 CAPSULE ORAL at 13:09

## 2022-03-18 RX ADMIN — HYDROCORTISONE SODIUM SUCCINATE 25 MG: 100 INJECTION, POWDER, FOR SOLUTION INTRAMUSCULAR; INTRAVENOUS at 22:14

## 2022-03-18 RX ADMIN — IPRATROPIUM BROMIDE AND ALBUTEROL SULFATE 1 AMPULE: 2.5; .5 SOLUTION RESPIRATORY (INHALATION) at 13:33

## 2022-03-18 RX ADMIN — ARFORMOTEROL TARTRATE 15 MCG: 15 SOLUTION RESPIRATORY (INHALATION) at 20:18

## 2022-03-18 RX ADMIN — BUMETANIDE 1 MG: 0.25 INJECTION INTRAMUSCULAR; INTRAVENOUS at 23:03

## 2022-03-18 RX ADMIN — LINEZOLID 600 MG: 100 SUSPENSION ORAL at 08:38

## 2022-03-18 RX ADMIN — SODIUM CHLORIDE, PRESERVATIVE FREE 10 ML: 5 INJECTION INTRAVENOUS at 08:14

## 2022-03-18 RX ADMIN — BUDESONIDE 500 MCG: 0.5 SUSPENSION RESPIRATORY (INHALATION) at 08:42

## 2022-03-18 RX ADMIN — INSULIN GLARGINE 30 UNITS: 100 INJECTION, SOLUTION SUBCUTANEOUS at 08:29

## 2022-03-18 RX ADMIN — DIAZEPAM 5 MG: 5 TABLET ORAL at 18:05

## 2022-03-18 RX ADMIN — OXYCODONE HYDROCHLORIDE 10 MG: 5 SOLUTION ORAL at 22:13

## 2022-03-18 RX ADMIN — NALOXEGOL OXALATE 25 MG: 12.5 TABLET, FILM COATED ORAL at 08:06

## 2022-03-18 RX ADMIN — DIAZEPAM 5 MG: 5 TABLET ORAL at 00:20

## 2022-03-18 RX ADMIN — ENOXAPARIN SODIUM 135 MG: 150 INJECTION SUBCUTANEOUS at 08:10

## 2022-03-18 RX ADMIN — DEXMEDETOMIDINE HYDROCHLORIDE 0.8 MCG/KG/HR: 100 INJECTION, SOLUTION INTRAVENOUS at 11:10

## 2022-03-18 RX ADMIN — HYDROCORTISONE SODIUM SUCCINATE 25 MG: 100 INJECTION, POWDER, FOR SOLUTION INTRAMUSCULAR; INTRAVENOUS at 05:44

## 2022-03-18 RX ADMIN — ENOXAPARIN SODIUM 135 MG: 150 INJECTION SUBCUTANEOUS at 22:12

## 2022-03-18 RX ADMIN — SODIUM CHLORIDE, PRESERVATIVE FREE 10 ML: 5 INJECTION INTRAVENOUS at 22:26

## 2022-03-18 RX ADMIN — MICONAZOLE NITRATE: 20 POWDER TOPICAL at 22:26

## 2022-03-18 RX ADMIN — PANTOPRAZOLE SODIUM 40 MG: 40 INJECTION, POWDER, FOR SOLUTION INTRAVENOUS at 08:10

## 2022-03-18 RX ADMIN — QUETIAPINE FUMARATE 50 MG: 25 TABLET ORAL at 22:12

## 2022-03-18 RX ADMIN — INSULIN LISPRO 3 UNITS: 100 INJECTION, SOLUTION INTRAVENOUS; SUBCUTANEOUS at 08:29

## 2022-03-18 RX ADMIN — OXYCODONE HYDROCHLORIDE 10 MG: 5 SOLUTION ORAL at 13:09

## 2022-03-18 RX ADMIN — METOPROLOL TARTRATE 75 MG: 50 TABLET, FILM COATED ORAL at 08:06

## 2022-03-18 RX ADMIN — BUMETANIDE 1 MG: 0.25 INJECTION INTRAMUSCULAR; INTRAVENOUS at 11:33

## 2022-03-18 RX ADMIN — OXYCODONE HYDROCHLORIDE 10 MG: 5 SOLUTION ORAL at 08:06

## 2022-03-18 RX ADMIN — INSULIN GLARGINE 30 UNITS: 100 INJECTION, SOLUTION SUBCUTANEOUS at 23:04

## 2022-03-18 RX ADMIN — MICONAZOLE NITRATE: 20 POWDER TOPICAL at 08:11

## 2022-03-18 RX ADMIN — CHLORHEXIDINE GLUCONATE 0.12% ORAL RINSE 15 ML: 1.2 LIQUID ORAL at 08:10

## 2022-03-18 RX ADMIN — DEXTROSE MONOHYDRATE 5 MG/HR: 50 INJECTION, SOLUTION INTRAVENOUS at 20:20

## 2022-03-18 RX ADMIN — INSULIN LISPRO 3 UNITS: 100 INJECTION, SOLUTION INTRAVENOUS; SUBCUTANEOUS at 11:38

## 2022-03-18 RX ADMIN — SENNOSIDES 17.6 MG: 8.8 SYRUP ORAL at 08:07

## 2022-03-18 RX ADMIN — OXYCODONE HYDROCHLORIDE 10 MG: 5 SOLUTION ORAL at 01:56

## 2022-03-18 RX ADMIN — LIDOCAINE HYDROCHLORIDE 5 ML: 10 INJECTION, SOLUTION EPIDURAL; INFILTRATION; INTRACAUDAL; PERINEURAL at 12:13

## 2022-03-18 RX ADMIN — CEPHALEXIN 1000 MG: 500 CAPSULE ORAL at 22:20

## 2022-03-18 RX ADMIN — DIAZEPAM 5 MG: 5 TABLET ORAL at 11:33

## 2022-03-18 RX ADMIN — HYDROCORTISONE SODIUM SUCCINATE 25 MG: 100 INJECTION, POWDER, FOR SOLUTION INTRAMUSCULAR; INTRAVENOUS at 13:11

## 2022-03-18 RX ADMIN — IPRATROPIUM BROMIDE AND ALBUTEROL SULFATE 1 AMPULE: 2.5; .5 SOLUTION RESPIRATORY (INHALATION) at 08:42

## 2022-03-18 RX ADMIN — DIAZEPAM 5 MG: 5 TABLET ORAL at 05:44

## 2022-03-18 RX ADMIN — BUDESONIDE 500 MCG: 0.5 SUSPENSION RESPIRATORY (INHALATION) at 20:18

## 2022-03-18 RX ADMIN — DOCUSATE SODIUM LIQUID 100 MG: 100 LIQUID ORAL at 22:14

## 2022-03-18 RX ADMIN — INSULIN LISPRO 3 UNITS: 100 INJECTION, SOLUTION INTRAVENOUS; SUBCUTANEOUS at 20:42

## 2022-03-18 RX ADMIN — METOPROLOL TARTRATE 75 MG: 50 TABLET, FILM COATED ORAL at 20:11

## 2022-03-18 RX ADMIN — ARFORMOTEROL TARTRATE 15 MCG: 15 SOLUTION RESPIRATORY (INHALATION) at 08:42

## 2022-03-18 RX ADMIN — QUETIAPINE FUMARATE 50 MG: 25 TABLET ORAL at 08:06

## 2022-03-18 RX ADMIN — INSULIN LISPRO 6 UNITS: 100 INJECTION, SOLUTION INTRAVENOUS; SUBCUTANEOUS at 00:23

## 2022-03-18 RX ADMIN — CHLORHEXIDINE GLUCONATE 0.12% ORAL RINSE 15 ML: 1.2 LIQUID ORAL at 22:14

## 2022-03-18 RX ADMIN — DOXYCYCLINE HYCLATE 100 MG: 100 CAPSULE ORAL at 22:14

## 2022-03-18 ASSESSMENT — PAIN SCALES - WONG BAKER
WONGBAKER_NUMERICALRESPONSE: 6

## 2022-03-18 ASSESSMENT — PAIN SCALES - GENERAL
PAINLEVEL_OUTOF10: 0
PAINLEVEL_OUTOF10: 5
PAINLEVEL_OUTOF10: 0
PAINLEVEL_OUTOF10: 6
PAINLEVEL_OUTOF10: 5
PAINLEVEL_OUTOF10: 5

## 2022-03-18 NOTE — PROGRESS NOTES
Date: 3/18/2022    Time: 8:44 AM    Patient Placed On BIPAP/CPAP/ Non-Invasive Ventilation? No    If no must comment. Facial area red/color change? No           If YES are Blister/Lesion present? No   If yes must notify nursing staff  BIPAP/CPAP skin barrier? Yes    Skin barrier type:mepilexlite     Comments: pt remains on from previous shift    Christy Ortiz

## 2022-03-18 NOTE — PATIENT CARE CONFERENCE
Intensive Care Daily Quality Rounding Checklist      ICU Team Members: bedside nurse, charge nurse, Annie Salcido, residents    ICU Day #: NUMBER: 11    Intubation Date:      Ventilator Day #:     Central Line Insertion Date: March 8        Day #: NUMBER: 11     Arterial Line Insertion Date:        Day #:     Temporary Hemodialysis Catheter Insertion Date:        Day #     DVT Prophylaxis:lovenox 135mg    GI Prophylaxis:protonix    Hansen Catheter Insertion Date: March 8       Day #: 11      Continued need (if yes, reason documented and discussed with physician): yes,     Skin Issues/ Wounds and ordered treatment discussed on rounds: y    Goals/ Plans for the Day: PICC line placement, wean oxygen as tolerated,labs,DC central line

## 2022-03-18 NOTE — PROGRESS NOTES
Pulmonary Subsequent Hospital F/U note    Patient is being followed for: acute on chronic hypoxic and hypercapnic respiratory failure     Interval HPI:  Currently on precedex infusion. tolerating AVAPS - cycle on and off today. Appeared well after extubation. Asking for something to drink. Cardioversion 3/16/22 remains on amiodarone    ROS:  Difficult to obtain     Exam:  BP (!) 179/104   Pulse 73   Temp 98.6 °F (37 °C) (Bladder)   Resp 27   Ht 5' 4\" (1.626 m)   Wt (!) 330 lb 3.2 oz (149.8 kg)   LMP  (LMP Unknown)   SpO2 100%   BMI 56.68 kg/m²    General: Patient is calm and following commands   HEENT: PERRL, EOMI, NIV mask in place   Neck: supple no adenopathy  CV: RRR without murmur  Lungs: decreased BS diffusely  Abd: soft, ND, NT, bowel sounds normal  Ext: warm, extensive chronic lymphedema of the legs     Data:    Oximetry:  SpO2 Readings from Last 1 Encounters:   03/18/22 100%       Imaging personally reviewed by myself:  CXR 3/18/22         FINDINGS:   EKG leads overlie the chest.  Interval extubation.  Left IJ catheter and   nasogastric tube remain in place.  Cardiac silhouette remains enlarged likely   due to cardiomegaly.  There appears to be some improvement in infiltrates.    No pneumothorax or other significant change.           Impression   Improving aeration.  Continued follow-up recommended.               Pertinent labs reviewed and noted:  Lab Results   Component Value Date    WBC 9.6 03/18/2022    HGB 7.6 03/18/2022    HCT 26.7 03/18/2022    MCV 87.0 03/18/2022    MCH 24.8 03/18/2022    MCHC 28.5 03/18/2022    RDW 16.4 03/18/2022     03/18/2022    MPV 10.4 03/18/2022     Lab Results   Component Value Date     03/18/2022    K 3.8 03/18/2022    K 3.6 03/16/2022     03/18/2022    CO2 34 03/18/2022    BUN 21 03/18/2022    CREATININE 0.7 03/18/2022    LABALBU 2.8 03/18/2022    LABALBU 4.5 11/02/2011    CALCIUM 7.8 03/18/2022    GFRAA >60 03/18/2022    LABGLOM >60 03/18/2022 Lab Results   Component Value Date    PROTIME 19.5 04/29/2021    INR 1.8 04/29/2021       Assessment:  1. Acute on chronic hypoxic and hypercapnic respiratory failure  2. Atelectasis  3. Group G strep bacteremia  4. Fevers  5. Cellulitis R lower extremity  6. Morbid obesity BMI 56  7. Lymphedema  8. Septic shock, resolved  9. Atrial fibrillation   10. HFpEF  11. Pulmonary hypertension  12. COPD    Plan:  1. AVAPS - cycle on and off. Definitely needs to wear this nocturnally moving forward. 2. precedex per ICU team   3. Abx per ID  4. Continue diuresis   5. Would stop solucortef     This plan of care reviewed in collaboration with Dr Vinicio Puga  Electronically signed by RAUL Pabon CNP on 3/18/2022 at 12:31 PM    I personally saw, examined, and cared for the patient. Labs, medications, radiographs reviewed. I agree with history exam and plans detailed in NP note.     Carly Bonner MD

## 2022-03-18 NOTE — PROGRESS NOTES
Subjective:  Extubated this morning  Conversant  Responding to questions adequately  Still requiring fair amount of Precedex and Versed secondary to severe agitation      Objective:    BP (!) 148/69   Pulse 86   Temp 100 °F (37.8 °C) (Bladder)   Resp 21   Ht 5' 4\" (1.626 m)   Wt (!) 330 lb 3.2 oz (149.8 kg)   LMP  (LMP Unknown)   SpO2 100%   BMI 56.68 kg/m²     In: 3694.8 [I.V.:2269.2; NG/GT:1376]  Out: 1945   In: 3694.8   Out: 1945 [Urine:1945]    General appearance i extubated awake, poor hygiene malodorous  HEENT: AT/NC, MMM  Neck: FROM, supple  Lungs: Clear to auscultation  CV: RRR, no MRGs  Vasc: Radial pulses 2+  Abdomen: Soft, non-tender; no masses or HSM  Extremities: Ichthyosis bilateral lower extremities-bilateral lower extremities in dressing  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  *      Plan:    25-year-old woman with multiple comorbidities admitted to ICU setting for septic shock on triple pressor support, broad-spectrum IV antibiotic therapy, intubated sedated     Extubated 3/17/2022  IV fluid resuscitation for marked lactic acidosis-resolved  Off pressor support, start midodrine if needed  on Solu-Cortef 3 times daily-wean now the blood pressure is improved-contributing to marked elevation in blood sugars  -blood cultures with alpha hemolytic strep  Broad-spectrum IV antibiotics with infectious disease following-Merrem has been transitioned to Rocephin 3/9/2021-beta strep group G/ linezolid added-white count resolved  ruq us -hepatomegaly with diffuse fatty infiltration/gallstones and sludge  Glargine, insulin sliding scale for blood sugar management,   Initiation of Precedex, fentanyl and Versed for sedation/agitation status post extubation  Amiodarone being resumed now secondary to marked elevation and heart rate status post cardioversion for hemodynamic instability    Daily labs     DVT Prophylaxis   PT/OT  Discharge Angelique Seals MD  9:41 PM  3/17/2022

## 2022-03-18 NOTE — PROGRESS NOTES
INPATIENT CARDIOLOGY FOLLOW-UP    Name: Cha Wellington    Age: 74 y.o. Date of Admission: 3/8/2022  4:28 AM    Date of Service: 3/18/2022    Chief Complaint: Follow-up for resolving acute combined hypoxic and hypercapnic respiratory failure superimposed upon chronic hypoxic respiratory failure, chronic diastolic heart failure, resolving sepsis, chronic obstructive lung disease, hypertension, paroxysmal atrial fibrillation, morbid obesity    Interim History: The patient has gradually improved with the ability to wean and discontinue mechanical ventilation with persistent needs of BiPAP for adequate oxygenation. She is maintained sinus rhythm with resolution of ST depression and a slight further increase of high-sensitivity troponin levels consistent with a type II non-ST elevation ischemic event. A persistent positive fluid balance is noted with present diuretic administration and with stable renal function and electrolytes. A persistent anemia is noted. Review of Systems: The remainder of a complete multisystem review including consitutional, central nervous, respiratory, circulatory, gastrointestinal, genitourinary, endocrinologic, hematologic, musculoskeletal and psychiatric are negative.     Problem List:  Patient Active Problem List   Diagnosis    Uncontrolled diabetes mellitus ([facility]    Depression    Essential hypertension    Hyperlipidemia    Osteoarthritis    PAF ([facility]oxysmal atrial fibrillation) ([facility]) - currently in SR    Pulmonary hypertension    Chronic sinusitis    Chronic pain    Steatohepatitis    Baker's cyst of knee    Diabetic neuropathy ([facility])    Iron deficiency    LVH (left ventricular hypertrophy)    Chronic anal fissure    Positive hepatitis C antibody test    PFO (patent foramen ovale)    YEIMI (acute kidney injury) ([facility] [address] [facility]    Chronic diastolic heart failure (HCC)    Physical deconditioning    Lymphedema of both lower extremities    Chronic anemia    Dyspnea on exertion    Chronic deep vein thrombosis (DVT) of distal vein of right lower extremity (HCC)    GI bleed    Anxiety and depression    Chronic anticoagulation    COPD exacerbation (HCC)    Acute on chronic respiratory failure with hypoxia (HCC)    Blood loss anemia    Cellulitis of left lower extremity    Poorly controlled diabetes mellitus ([facility] [address]    Lymphedema    Septicemia (HCC)    Acute diastolic congestive heart failure (HCC)    Anemia    Morbid obesity with BMI of 45.0-49.9, adult (HCC)    Acute hypoxemic respiratory failure (HCC)    Diastolic CHF, acute on chronic (HCC)    Cellulitis    Stress fracture of right fibula    Atrial fibrillation, currently in sinus rhythm    Gastroesophageal reflux disease without esophagitis    Ulcers of both lower legs (HCC)    Chronic respiratory failure with hypoxia (HCC)    Abscess and cellulitis of gluteal region    CHF (congestive heart failure), NYHA class I, acute on chronic, combined ([facility])    Adrenal mass (HCC)    Hyperglycemia    Atrial fibrillation ([facility] [address]    Infection due to extended-spectrum beta-lactamase-producing Escherichia coli    Bilateral cellulitis of lower leg    Ambulatory dysfunction    Chronic respiratory failure with hypercapnia (HCC)    Moderate pulmonary hypertension (HCC)    QT prolongation    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[facility] [address]    Sepsis ([facility] [address]       Allergies:   Allergies   Allergen Reactions    Statins Myalgia       Current Medications:  Current Facility-Administered Medications   Medication Dose Route Frequency Provider Last Rate Last Admin    metoprolol tartrate (LOPRESSOR) tablet 50 mg  50 mg Oral BID Lolly Boyer MD   50 mg at 03/17/22 2032    naloxegol (MOVANTIK) tablet 25 mg  25 mg Oral QAM Qi Dubon MD   25 mg at 03/17/22 1659    ipratropium-albuterol (DUONEB) nebulizer solution 1 ampule  1 ampule Inhalation Q6H WA Qi Dubon MD   1 ampule at 03/17/22 1950    Arformoterol Tartrate (BROVANA) nebulizer solution 15 mcg  15 mcg Nebulization BID Rosy Bassett MD   15 mcg at 03/17/22 1950    budesonide (PULMICORT) nebulizer suspension 500 mcg  0.5 mg Nebulization BID Rosy Bassett MD   500 mcg at 03/17/22 1950    ipratropium-albuterol (DUONEB) nebulizer solution 1 ampule  1 ampule Inhalation Q4H PRN Rosy Bassett MD   1 ampule at 03/16/22 1011    diazePAM (VALIUM) tablet 5 mg  5 mg Oral Q6H Rosy Bassett MD   5 mg at 03/18/22 0544    oxyCODONE (ROXICODONE) 5 MG/5ML solution 10 mg  10 mg Oral Q6H Rosy Bassett MD   10 mg at 03/18/22 0156    amiodarone (CORDARONE) 450 mg in dextrose 5 % 250 mL infusion  0.5 mg/min IntraVENous Continuous Syliva Seen, DO 16.7 mL/hr at 03/18/22 0600 0.5 mg/min at 03/18/22 0600    dexmedetomidine (PRECEDEX) 400 mcg in sodium chloride 0.9 % 100 mL infusion  0.1-1.5 mcg/kg/hr IntraVENous Continuous Regenia Andrew, DO 29.96 mL/hr at 03/18/22 0600 0.8 mcg/kg/hr at 03/18/22 0600    insulin glargine (LANTUS) injection vial 30 Units  30 Units SubCUTAneous BID Daniela Morales Tofil [place], DO   30 Units at 03/17/22 2033    lidocaine PF 1 % injection 5 mL  5 mL IntraDERmal Once Roys Bassett MD        sodium chloride flush 0.9 % injection 5-40 mL  5-40 mL IntraVENous 2 times per day Rosy Bassett MD   10 mL at 03/17/22 2140    sodium chloride flush 0.9 % injection 5-40 mL  5-40 mL IntraVENous PRN Rosy Bassett MD   10 mL at 03/15/22 2028    0.9 % sodium chloride infusion  25 mL IntraVENous PRN Rosy Bassett MD        heparin flush 100 UNIT/ML injection 300 Units  3 mL IntraVENous 2 times per day Rosy Bassett MD        heparin flush 100 UNIT/ML injection 300 Units  3 mL IntraCATHeter PRN Rosy Bassett MD        QUEtiapine (SEROQUEL) tablet 50 mg  50 mg Oral BID Lenn Carmen Langston, DO   50 mg at 03/17/22 2033    white petrolatum ointment   Topical BID PRN Daniela Langston DO        fentaNYL 5 mcg/ml in 0.9%  ml infusion   mcg/hr IntraVENous Continuous Thelbert Saliva Toolson, DO   Stopped at 03/17/22 1222    hydrocortisone sodium succinate PF (SOLU-CORTEF) injection 25 mg  25 mg IntraVENous Q8H Gio Mcdanielon, DO   25 mg at 03/18/22 0544    fentaNYL (SUBLIMAZE) injection 25 mcg  25 mcg IntraVENous Q2H PRN Rolansimi Saliva Jonason, DO   25 mcg at 03/17/22 1959    linezolid (ZYVOX) 100 MG/5ML suspension 600 mg  600 mg Per NG tube 2 times per day Jake Frye MD   600 mg at 03/17/22 2001    insulin lispro (HUMALOG) injection vial 0-18 Units  0-18 Units SubCUTAneous Q4H Georgiann Rouge, DO   6 Units at 03/18/22 0023    docusate (COLACE) 50 MG/5ML liquid 100 mg  100 mg Per NG tube BID Rolansimi Mcdanielon, DO   100 mg at 03/17/22 2032    senna (SENOKOT) 8.8 MG/5ML syrup 17.6 mg  10 mL Per NG tube BID [facility] Nikolai Blankenship, DO   17.6 mg at 03/17/22 2001    enoxaparin (LOVENOX) injection 135 mg  1 mg/kg SubCUTAneous BID [facility]   135 mg at 03/17/22 2032    chlorhexidine (PERIDEX) 0.12 % solution 15 mL  15 mL Mouth/Throat BID [facility]   15 mL at 03/17/22 2001    pantoprazole (PROTONIX) injection 40 mg  40 mg IntraVENous Daily [facility] Nikolai Blankenship, DO   40 mg at 03/17/22 9731    dextrose bolus (hypoglycemia) 10% 125 mL  125 mL IntraVENous PRN Fabio December, DO        Or    dextrose bolus (hypoglycemia) 10% 250 mL  250 mL IntraVENous PRN [place] December, DO        glucose (GLUTOSE) 40 % oral gel 15 g  15 g Oral PRN Chase Gera Christine Lindstrom, DO        glucagon (rDNA) injection 1 mg  1 mg IntraMUSCular PRN Chase Gera Tofil Lindstrom, DO        dextrose 5 % solution  100 mL/hr IntraVENous PRN Chase Geraloli King Lindstrom, DO        midazolam PF (VERSED) injection 2 mg  2 mg IntraVENous Q3H PRN [facility]   2 mg at 03/14/22 0350    midazolam (VERSED) 1 mg/mL in D5W infusion  1-10 mg/hr IntraVENous Continuous Lili Blankenship DO   Stopped at 03/17/22 1222    acetaminophen (TYLENOL) tablet 650 mg  650 mg Oral Q4H PRN Giulia Burton MD   650 mg at 03/17/22 0924    acetaminophen (TYLENOL) suppository 650 mg  650 mg Rectal Q4H PRN Kimberly Wilson MD        miconazole (MICOTIN) 2 % powder   Topical BID Kimberly Wilson MD   Given at 03/17/22 2140    0.9 % sodium chloride infusion   IntraVENous Continuous Kimberly Wilson MD 10 mL/hr at 03/18/22 0010 Rate Verify at 03/18/22 0010      amiodarone 0.5 mg/min (03/18/22 0600)    dexmedetomidine (PRECEDEX) IV infusion 0.8 mcg/kg/hr (03/18/22 0600)    sodium chloride      fentaNYL 5 mcg/ml in 0.9%  ml infusion Stopped (03/17/22 1222)    dextrose      midazolam Stopped (03/17/22 1222)    sodium chloride 10 mL/hr at 03/18/22 0010       Physical Exam:  BP (!) 148/69   Pulse 62   Temp 98.6 °F (37 °C) (Bladder)   Resp (!) 37   Ht 5' 4\" (1.626 m)   Wt (!) 330 lb 3.2 oz (149.8 kg)   LMP  (LMP Unknown)   SpO2 100%   BMI 56.68 kg/m²   Weight change: Wt Readings from Last 3 Encounters:   03/14/22 (!) 330 lb 3.2 oz (149.8 kg)   08/31/21 (!) 304 lb (137.9 kg)   08/19/21 (!) 305 lb 5.4 oz (138.5 kg)     The patient is awake, alert and in no discomfort or distress. She remains extremely anxious. No gross musculoskeletal deformity is present. No significant skin or nail changes are present. Gross examination of head, eyes, nose and throat are negative. Jugular venous pressure is normal and no carotid bruits are present. Normal respiratory effort is noted with no accessory muscle usage present. Lung fields are clear to ascultation with somewhat distant breath sounds while on BiPAP. Cardiac examination is notable for a regular rate and rhythm with no palpable thrill. No gallop rhythm or cardiac murmur are identified. A benign abdominal examination is present with the exception of obesity and no masses or organomegaly. Intact pulses are present throughout upper extremities and chronic lymphedema and associated chronic skin changes is present.  No focal neurologic deficits are present. Intake/Output:    Intake/Output Summary (Last 24 hours) at 3/18/2022 0708  Last data filed at 3/18/2022 0600  Gross per 24 hour   Intake 2558.2 ml   Output 1825 ml   Net 733.2 ml     No intake/output data recorded. Laboratory Tests:  Lab Results   Component Value Date    CREATININE 0.7 03/18/2022    BUN 21 03/18/2022     03/18/2022    K 3.8 03/18/2022     03/18/2022    CO2 34 (H) 03/18/2022     No results for input(s): CKTOTAL, CKMB in the last 72 hours.     Invalid input(s): TROPONONI  No results found for: BNP  Lab Results   Component Value Date    WBC 9.6 03/18/2022    RBC 3.07 03/18/2022    HGB 7.6 03/18/2022    HCT 26.7 03/18/2022    MCV 87.0 03/18/2022    MCH 24.8 03/18/2022    MCHC 28.5 03/18/2022    RDW 16.4 03/18/2022     03/18/2022    MPV 10.4 03/18/2022     Recent Labs     03/16/22  0510 03/17/22  0500 03/18/22  0430   ALKPHOS 130* 112* 102   ALT 83* 55* 45*   AST 22 17 18   PROT 6.5 6.2* 6.3*   BILITOT 0.3 0.3 0.3   LABALBU 2.8* 2.7* 2.8*     Lab Results   Component Value Date    MG 2.2 03/18/2022     Lab Results   Component Value Date    PROTIME 19.5 04/29/2021    INR 1.8 04/29/2021     Lab Results   Component Value Date    TSH 1.890 08/18/2021     No components found for: CHLPL  Lab Results   Component Value Date    TRIG 298 (H) 01/16/2021    TRIG 512 (H) 05/26/2020    TRIG 861 (H) 11/07/2019     Lab Results   Component Value Date    HDL 52 01/16/2021    HDL 33 05/26/2020    HDL 28 11/07/2019     Lab Results   Component Value Date    LDLCALC 243 (H) 01/16/2021    LDLCALC - (AA) 05/26/2020    LDLCALC - (AA) 11/07/2019       Cardiac Tests:  ECG: A repeat electrocardiogram postconversion reviewed at time of evaluation demonstrates evidence of sinus rhythm with an intraventricular conduction delay and resolution of previously noted ST depression  Telemetry findings reviewed: sinus rhythm, no new tachy/bradyarrhythmias overnight  Chest X-ray: A repeat chest x-ray reviewed at time of evaluation demonstrates persistent poor inspiratory effort with possible cardiomegaly and persistent but improved interstitial infiltrates, particularly involving that of the right lung      ASSESSMENT / PLAN: On a clinical basis, the patient presently appears improved from a cardiovascular standpoint and is presently extubated with persistent evidence of hypoxic respiratory failure and present requirements of BiPAP for adequate oxygen saturations. Her repeat electrocardiogram demonstrates resolution of previously noted ST depression with additional high-sensitivity troponin levels consistent with that of a type II non-ST elevation ischemic event on the basis of increased myocardial oxygen demands of a multifactorial nature. A repeat limited echocardiogram will be obtained to assess any changes of left ventricular systolic function to guide any modification of present medical management. Based on the maintenance of sinus rhythm, amiodarone will be discontinued with further optimization of her beta-blocker and once capable of administration orally as opposed to that of via a nasogastric feeding tube, conversion to metoprolol succinate. Continued fluid mobilization will be essential on a gradual basis with need of continued careful monitoring of her volume status as well as that of renal function and electrolytes with ongoing nutritional support essential to fluid mobilization reducing risk of progressive debilitation. Additional management will largely be deferred to the pulmonary/critical care service. Continued low molecular weight heparin administration for embolic protection will remain advisable with resumption of oral anticoagulation once her respiratory status is further stabilized.   As previously outlined, and long-term basis, appropriate lifestyle modification to achieve weight reduction as well as that of appropriate management of obstructive sleep apnea will be necessary to reduce risk of adverse cardiovascular effects, both related to diastolic cardiac function and risk of recurrent atrial arrhythmias. Ongoing aggressive risk factor modification of blood pressure, diabetes and serum lipids will remain essential to reducing risk of future atherosclerotic development. Note: This report was completed utilizing computer voice recognition software. Every effort has been made to ensure accuracy, however; inadvertent computerized transcription errors may be present. Teddy Grossman.  Laisha Sauer, [id number] [facility]

## 2022-03-18 NOTE — PROGRESS NOTES
3/18/2022  11:08 AM      Comprehensive Nutrition Assessment    Type and Reason for Visit:  Reassess    Nutrition Recommendations/Plan: Continue current TF, until PO diet can be tolerated    Nutrition Assessment:  Pt extubated 3/17. Tolerated sips/drink of water. Continue to monitor possible diet progression. Currently, NGT in place and TF at goal rate    Malnutrition Assessment:  Malnutrition Status: At risk for malnutrition    Context:  Acute Illness     Findings of the 6 clinical characteristics of malnutrition:  Energy Intake:  Mild decrease in energy intake (Comment) (NPO on admit/intubation before TF)  Weight Loss:  Unable to assess     Body Fat Loss:  No significant body fat loss     Muscle Mass Loss:  No significant muscle mass loss    Fluid Accumulation:  Unable to assess Generalized (multiple factors)   Strength:    Not performed    Estimated Daily Nutrient Needs:  Energy (kcal):  ; Weight Used for Energy Requirements:  Admission     Protein (g):  110-120 (2-2.2 g/kg);  Weight Used for Protein Requirements:  Ideal        Fluid (ml/day):  per Critical Care; Method Used for Fluid Requirements:  Other (Comment)      Nutrition Related Findings:  extubated, +1-+3 general edema, +I/O 14L, rotund abd +BS, NGT to TF, A&Ox2      Wounds:  Stage II,Pressure Injury,Multiple       Current Nutrition Therapies:    Current Tube Feeding (TF) Orders:  · Feeding Route: Nasogastric  · Formula: Diabetic  · Schedule: Continuous (goal = 50 ml/hr = 1200 ml/d)  · Additives/Modulars:    · Water Flushes: 75 ml Q 4 hr =450 ml/d  · Current TF & Flush Orders Provides: at goal  · Goal TF & Flush Orders Provides: 1200 ml/d, 1800 juvencio, 99 g pro, 1416 ml total free water      Anthropometric Measures:  · Height: 5' 4\" (162.6 cm)  · Current Body Weight: 330 lb 3.2 oz (149.8 kg) (3/14 bedscale)   · Admission Body Weight: 310 lb 6.5 oz (140.8 kg) (3/12 bedscale - first measured)    · Usual Body Weight: 305 lb 5.4 oz (138.5 kg) (actual wt x 7 mo ago)     · Ideal Body Weight: 120 lbs; % Ideal Body Weight 275.2 %   · BMI: 56.7  · BMI Categories: Obese Class 3 (BMI 40.0 or greater)       Nutrition Diagnosis:   · Inadequate oral intake related to impaired respiratory function as evidenced by NPO or clear liquid status due to medical condition,intubation,wounds      Nutrition Interventions:   Food and/or Nutrient Delivery:  Continue NPO,Continue Current Tube Feeding  Nutrition Education/Counseling:  Education not indicated   Coordination of Nutrition Care:  Continue to monitor while inpatient    Goals:  Pt january EN at goal rate       Nutrition Monitoring and Evaluation:   Behavioral-Environmental Outcomes:  None Identified   Food/Nutrient Intake Outcomes:  Enteral Nutrition Intake/Tolerance  Physical Signs/Symptoms Outcomes:  GI Status,Biochemical Data,Fluid Status or Edema,Nutrition Focused Physical Findings,Chewing or Swallowing,Skin,Weight     Discharge Planning:     Too soon to determine     Electronically signed by Sebastián Nation RD, CNSC, LD on 3/18/22 at 11:08 AM EDT    Contact: [phone]

## 2022-03-18 NOTE — PLAN OF CARE

## 2022-03-18 NOTE — PLAN OF CARE
Problem: Inadequate oral food/beverage intake (NI-2.1)  Goal: Food and/or Nutrient Delivery  Description: Individualized approach for food/nutrient provision.   3/18/2022 1107 by Kacey Higuera RD, JULIO CESAR, LD  Outcome: Met This Shift  3/18/2022 1107 by Kacey Higuera RD, JULIO CESAR, LD  Outcome: Met This Shift

## 2022-03-18 NOTE — PROGRESS NOTES
Date: 3/18/2022    Time: 3:39 PM    Patient Placed On BIPAP/CPAP/ Non-Invasive Ventilation? Yes    If no must comment. Facial area red/color change? No           If YES are Blister/Lesion present? No   If yes must notify nursing staff  BIPAP/CPAP skin barrier?   Yes    Skin barrier type:mepilexlite       Comments:        Mónica Barrett RCP

## 2022-03-18 NOTE — PROGRESS NOTES
Critical Care Team - Daily Progress Note         Date and time: 3/18/2022 7:13 AM  Patient's name:  [place] [address] Record Number: [id number]  Patient's account/billing number: [de-identified]  Patient's YOB: 1955   Age: 74 y.o. Date of Admission: 3/8/2022  4:28 AM  Length of stay during current admission: 10      Primary Care Physician: Joel Jordan DO  ICU Attending Physician: Dr. Alex Butler Status: Full Code    Reason for ICU admission: Respiratory failure, shock      SUBJECTIVE:     OVERNIGHT EVENTS:    3/18: Patient was extubated on 3/17. Patient is tolerating BiPAP well. She did have an episode overnight of agitation trying to pull off her BiPAP and nasal cannula but when she desaturated she understood that she needed to keep those on. She is still asking for water and food. 3/17: Vent day 10. Patient failed weaning trial again yesterday. Patient experienced episode of tachycardia that was found to be A. fib with RVR. Patient was cardioverted on 3/16. Patient was also started on amiodarone drip. This morning she is in sinus rhythm. Patient was agitated overnight. Patient's agitation resolved with oxycodone administration. Patient remains on Versed, fentanyl, Precedex. She is still on Seroquel. Tube feeds are still running at this time. 3/16: Vent day 9, patient attempted weaning trial yesterday without success. Patient with agitation reported over night Remains on Versed 5 mg , Fentanyl 200 mcg Precedex 1.5 mcg. On Seroquel. Tube feeds running at 50.     3/15: Vent day #8. Patient was still agitated overnight. Patient was unable to be weaned off vent yesterday. We will likely try again today. She remains on Versed, fentanyl, Precedex. This morning she did experience a drop in her blood sugars, likely due to the decrease in steroids. Tube feeds are still running at this time. Lantus will be adjusted today. 3/14: Vent day #7.  Overnight patient was extremely agitated. Patient pulled art line. Patient had to be sedated because she kept trying to pull out her ETT. She is currently on versed, fentanyl and precedex. Patient tolerated Seroquel well. 3/13: Patient did not experience any acute events overnight. She has been very agitated however and trying to pull at tube. She is still on pressors at this time. 3/12: Pressors decreased. Mentation has not been appropriate yet. Tolerating tube feeds. 3/11: Patient had no acute problems overnight. She is currently being weaned off of vaso and weaned off sedation. She has been persistently hyperglycemic. 3/10: Pressors were changed from levo to rachelle. She would awake intermittently and be agitated. Precedex was started. Patient is tolerating tube feeds as well. 3/9: Patient has converted to sinus rhythm, opening eyes with spells of anxious awakenings. Levophed stopped. Remains on Vent. 3/8: Patient presented to the ICU with hypotension and respiratory failure. Patient was started on pressors and antibiotics at that time. Patient was also started on amiodarone drip for her A. fib. Patient was also started on insulin drip.     CURRENT VENTILATION STATUS:     [] Ventilator  [x] BIPAP  [x] Nasal Cannula [] Room Air      IF INTUBATED, ET TUBE MARKING AT LOWER LIP:  cms    SECRETIONS  Amount:  [] Small [] Moderate  [] Large  [x] None  Color:     [] White [] Colored  [] Bloody    SEDATION:  RAAS Score:  [] Propofol gtt  [] Versed gtt  [] Fentanyl gtt [] Ativan gtt   [x] Precedex    PARALYZED:  [x] No    [] Yes      VASOPRESSORS:  [x] No    [] Yes    If yes -   [] Levophed       [] Dopamine     [] Vasopressin       [] Dobutamine  [] Phenylephrine         [] Epinephrine     CENTRAL LINES:     [] No   [x] Yes   (Date of Insertion: 3/8--> needs changed)           If yes -     [] Right IJ     [x] Left IJ [] Right Femoral [] Left Femoral                   [] Right Subclavian [] Left Subclavian BRAUN'S CATHETER:   [] No   [x] Yes  (Date of Insertion: )     URINE OUTPUT:            [x] Good   [] Low              [] Anuric      OBJECTIVE:     VITAL SIGNS:  BP (!) 148/69   Pulse 62   Temp 98.6 °F (37 °C) (Bladder)   Resp (!) 37   Ht 5' 4\" (1.626 m)   Wt (!) 330 lb 3.2 oz (149.8 kg)   LMP  (LMP Unknown)   SpO2 100%   BMI 56.68 kg/m²   Tmax over 24 hours:  Temp (24hrs), Av °F (37.8 °C), Min:98.6 °F (37 °C), Max:101.1 °F (38.4 °C)      Patient Vitals for the past 6 hrs:   Pulse Resp SpO2   22 0530 62 (!) 37 100 %   22 0443 -- 24 --   22 0430 72 24 100 %   22 0330 79 27 100 %   22 0230 72 29 100 %   22 0130 64 22 100 %         Intake/Output Summary (Last 24 hours) at 3/18/2022 0713  Last data filed at 3/18/2022 0600  Gross per 24 hour   Intake 2558.2 ml   Output 1825 ml   Net 733.2 ml     Wt Readings from Last 2 Encounters:   22 (!) 330 lb 3.2 oz (149.8 kg)   21 (!) 304 lb (137.9 kg)     Body mass index is 56.68 kg/m². PHYSICAL EXAMINATION:    Physical Exam  Constitutional:       Appearance: She is morbidly obese. She is ill-appearing. Interventions: She is awake and agitated. HENT:      Head: Normocephalic and atraumatic. Eyes:      Conjunctiva/sclera: Conjunctivae normal.   Neck:      Trachea: No tracheal deviation. Cardiovascular:      Rate and Rhythm: Normal rate and regular rhythm. Heart sounds: Normal heart sounds. Pulmonary:      Effort: Pulmonary effort is normal. BiPAP on. Breath sounds: Decreased breath sounds present, improving  Abdominal:      General: Bowel sounds are normal.      Palpations: Abdomen is soft. Tenderness: There is no abdominal tenderness. Musculoskeletal:      Cervical back: Neck supple. Right lower leg: Edema present. Left lower leg: Edema present. Lymphadenopathy:      Cervical: No cervical adenopathy. Skin:     General: Skin is warm and dry. Findings: No rash. Comments: +bl lower extremity lymphedema chronic skin changes with right leg fissure erythma   Neurological:      General: No focal deficit present.    Psychiatric:         Behavior: Behavior normal.      Any additional physical findings: n/a    MEDICATIONS:    Scheduled Meds:   metoprolol tartrate  50 mg Oral BID    naloxegol  25 mg Oral QAM    ipratropium-albuterol  1 ampule Inhalation Q6H WA    Arformoterol Tartrate  15 mcg Nebulization BID    budesonide  0.5 mg Nebulization BID    diazePAM  5 mg Oral Q6H    oxyCODONE  10 mg Oral Q6H    insulin glargine  30 Units SubCUTAneous BID    lidocaine PF  5 mL IntraDERmal Once    sodium chloride flush  5-40 mL IntraVENous 2 times per day    heparin flush  3 mL IntraVENous 2 times per day    QUEtiapine  50 mg Oral BID    hydrocortisone sodium succinate PF  25 mg IntraVENous Q8H    linezolid  600 mg Per NG tube 2 times per day    insulin lispro  0-18 Units SubCUTAneous Q4H    docusate  100 mg Per NG tube BID    senna  10 mL Per NG tube BID    enoxaparin  1 mg/kg SubCUTAneous BID    chlorhexidine  15 mL Mouth/Throat BID    pantoprazole  40 mg IntraVENous Daily    miconazole   Topical BID     Continuous Infusions:   amiodarone 0.5 mg/min (03/18/22 0600)    dexmedetomidine (PRECEDEX) IV infusion 0.8 mcg/kg/hr (03/18/22 0600)    sodium chloride      fentaNYL 5 mcg/ml in 0.9%  ml infusion Stopped (03/17/22 1222)    dextrose      midazolam Stopped (03/17/22 1222)    sodium chloride 10 mL/hr at 03/18/22 0010     PRN Meds:   ipratropium-albuterol, 1 ampule, Q4H PRN  sodium chloride flush, 5-40 mL, PRN  sodium chloride, 25 mL, PRN  heparin flush, 3 mL, PRN  white petrolatum, , BID PRN  fentanNYL, 25 mcg, Q2H PRN  dextrose bolus (hypoglycemia), 125 mL, PRN   Or  dextrose bolus (hypoglycemia), 250 mL, PRN  glucose, 15 g, PRN  glucagon (rDNA), 1 mg, PRN  dextrose, 100 mL/hr, PRN  midazolam, 2 mg, Q3H PRN  acetaminophen, 650 mg, Q4H PRN  acetaminophen, 650 mg, Q4H PRN          VENT SETTINGS (Comprehensive) (if applicable):  Vent Information  $Ventilation: Off Vent  Skin Assessment: Clean, dry, & intact  Equipment ID: [id number]  Vent Type: [id number]  Vent Mode: (S) AC/PC  Vt Ordered: 450 mL  Pressure Ordered: (S) 30  Rate Set: 0 bmp  Peak Flow: 0 L/min  Pressure Support: 0 cmH20  FiO2 : 50 %  SpO2: 100 %  SpO2/FiO2 ratio: 300  Sensitivity: 2  PEEP/CPAP: 5  I Time/ I Time %: 0.9 s  Humidification Source: Heated wire  Humidification Temp: 37  Humidification Temp Measured: 37.1  Circuit Condensation: Drained  Mask Type: Full face mask  Mask Size: Medium  Additional Respiratory  Assessments  Pulse: 62  Resp: (!) 37  SpO2: 100 %  Position: Semi-Chapin's  Humidification Source: Heated wire  Humidification Temp: 37  Circuit Condensation: Drained  Oral Care: Mouth swabbed,Mouth suctioned  Subglottic Suction Done?: Yes  Cuff Pressure (cm H2O): 29 cm H2O    ABGs:   Recent Labs     03/18/22 0608   PH 7.479*   PCO2 47.9*   PO2 207.6*   HCO3 34.8*   BE 10.2*   O2SAT 99.8*       Laboratory findings:    Complete Blood Count:   Recent Labs     03/16/22 0510 03/17/22  0500 03/18/22  0430   WBC 9.1 7.9 9.6   HGB 8.0* 7.9* 7.6*   HCT 27.7* 27.3* 26.7*    263 276        Last 3 Blood Glucose:   Recent Labs     03/16/22 1730 03/17/22  0500 03/18/22  0430   GLUCOSE 220* 194* 134*        PT/INR:    Lab Results   Component Value Date    PROTIME 19.5 04/29/2021    INR 1.8 04/29/2021     PTT:    Lab Results   Component Value Date    APTT 36.3 04/29/2021       Comprehensive Metabolic Profile:   Recent Labs     03/16/22  0510 03/16/22  1730 03/17/22  0500 03/17/22  0500 03/18/22  0430   NA  --  147* 145  --  141   K  --  3.6 4.5  --  3.8   CL  --  104 104  --  100   CO2  --  33* 34*  --  34*   BUN  --  20 23  --  21   CREATININE  --  0.9 1.0  --  0.7   GLUCOSE  --  220* 194*  --  134*   CALCIUM  --  7.7* 7.7*  --  7.8*   PROT   < >  --  6.2*   < > 6.3*   LABALBU   < >  --  2.7*   < > 2.8* BILITOT   < >  --  0.3   < > 0.3   ALKPHOS   < >  --  112*   < > 102   AST   < >  --  17   < > 18   ALT   < >  --  55*   < > 45*    < > = values in this interval not displayed. Magnesium:   Lab Results   Component Value Date    MG 2.2 03/18/2022     Phosphorus:   Lab Results   Component Value Date    PHOS 3.4 03/18/2022     Ionized Calcium:   Lab Results   Component Value Date    CAION 1.37 10/24/2016        Urinalysis: No results found for: UA     Troponin: No results for input(s): TROPONINI in the last 72 hours. Microbiology:   Culture, Blood 2   Date Value Ref Range Status   03/15/2022 24 Hours no growth  Preliminary       Cultures during this admission:     Blood cultures:  [] None drawn      [x] Negative             [x]  Positive (Details: 3/8 beta strep group G, repeats 3/9 negative)  Urine Culture:   [] None drawn      [x] Negative             []  Positive (Details:  )  Sputum Culture:  [x] None drawn      [] Negative             []  Positive (Details:  )   Wound Culture:  [] None drawn      [] Negative             [x]  Positive (Details: S. Aureus, Proteus sp 3/8  )     Other pertinent Labs:        Radiology/Imaging:     Chest Xray (3/18/2022): Improving aeration.  Continued follow-up recommended. ASSESSMENT:     Principal Problem:    Sepsis ([address]  Resolved Problems:    * No resolved hospital problems. *      PLAN:     WEAN PER PROTOCOL:  [] No   [x] Yes  [] N/A    DISCONTINUE ANY LABS:   [x] No   [] Yes    ICU PROPHYLAXIS:  Stress ulcer:  [x] PPI Agent  [] [id number] [] Sucralfate  [] Other:  VTE:   [] Enoxaparin  [] Unfract.  Heparin Subcut  [] EPC Cuffs    NUTRITION:  [x] NPO [x] Tube Feeding (Specify: ) [] TPN  [] PO (Diet: Diet NPO  ADULT TUBE FEEDING; Nasogastric; Diabetic; Continuous; 10; Yes; 15; Q 8 hours; 50; 75; Q 4 hours)    HOME MEDICATIONS RECONCILED: [] No  [x] Yes    INSULIN DRIP:   [x] No   [] Yes    CONSULTATION NEEDED:  [x] No   [] Yes    FAMILY UPDATED:    [] No   [x] Yes    TRANSFER OUT OF ICU:   [] No   [x] Yes    SYSTEMS ASSESSMENT    Neuro     Extubated on 3/17  OFF Fentanyl, Versed  Agitated at times    Agitation   Seroquel 50 mg BID, can increase   Precedex continued, wean as tolerated  Valium 5 mg every 6 hours  Oxycodone 10 mg every 6 hours    Respiratory     Metabolic Alkalosis with Respiratory Acidosis  S/P 10 day intubation, Extubated 3/17  Alternating between BiPAP and 6L NC   Home O2 requirements, 6L O2 NC  Wean oxygen as tolerated.  Keep O2 sat 90-92%  Pulmonology following, appreciate input    Cardiovascular     Paroxysmal atrial fibrillation  OFF Amnio drip  Continue to monitor  Therapeutic Lovenox  S/p Cardioversion 3/16    Septic shock  Solu-Cortef 25 mg Q8H  Blood cultures positive Beta strep group G 3/8, repeats negative  Wound culture 3/8 Staph aureus and Proteus sp  Patient remains febrile  IV NS 20 mL/hr  See infectious disease below    Fluid overload  Bumex 1 mg twice daily  Strict I's and O's  Continue to monitor    Gastrointestinal     Possible Cholecystitis  Ultrasound 3/11 gallstones and edematous gallbladder wall  Consider surgery consult    GI PPx  Protonix 40 mg IV daily    Bowel Regimen  Colace 100 mg BID  Senna daily    Renal     CKD stage III, stable  Baseline creatinine 1.1-1.2  Continue to monitor    Electrolyte abnormalities  Replete as needed  Continue to monitor     Infectious Disease     Group G Streptococcus Septicemia  Likely 2/2 to cellulitis of leg (however, cultures grew Proteus)  Blood cultures positive x2 for beta strep group G  S/P Ceftriaxone 2g Q24H (completed 7 days with 1 day of cefepime prior)  Linezolid 600 mg Q12H (Day 7)  ID following, appreciate input    Leukocytosis, resolved  Likely secondary to infection and steroid use  Continue to monitor    Febrile, resolved  Tylenol suppository every 4 hours as needed for fever    Hematology/Oncology     Anticoagulation  Lovenox 135 mg twice daily      Acute on Chronic Normocytic cholecystitis  · Acute Respiratory Failure - on ACVC mechanical ventilation    · Leukocytosis, improving  · Chronic Hypoxic Respiratory Failure - baseline 6L O2 NC  · Moderate/Severe COPD  · Elevation of transaminases.  Possible cholecystitis.  They seem to be trending downward.  Ultrasound shows gallstones and edematous gallbladder wall  · Morbid Obesity - BMI 51  · Atrial Fibrillation with RVR - DCCV x 1 cardioversion 3/16/2022, cardizem and amiodarone given, with initiation of amiodarone drip    · Chronic Diastolic CHF Dysfunction   · Chronic Lymphedema of the Lower Extremities      In addition the following applies:     Check: N/A  Medication Alterations: lantus to 30 units BID, bumex 1 mg IV BID, duoneb, brovana, pulmicort  Procedures: N/A  Imaging: reviewed  New Consultations: N/A  VENT: N/A  - s/p extubation 3/17/2022 after 10 days on mechanical ventilation ACVC/ACPC     Access: Left IJ TLC   Consults: ID, Dietary, Cardiology    Drips: Precedex  Nutrition: Tube Feeds  ABX: Linezolid  Completed ABX: Ceftriaxone     - DCCV x 1 cardioversion 3/16/2022, cardizem and amiodarone given, with initiation of amiodarone drip    - liberation from vent to AVAPS on 3/17/2022, family present and updated at bedside  - 13L + at this time as of 3/18/2022, diuresis, supportive care  - patient already on therapeutic anticoagulation with lovenox subcutaneous   - wean off precedex while off mechanical ventilation  - OXY IR and Valium PO  - patient to remain in ICU level of care due to need for precedex  - consideration to [facility] placement for oxygen wean/BIPAP/AVAPS    Thank you for allowing me to participate in the care of this patient. Care reviewed with nursing staff, medical and surgical specialty care, primary care and the patient's family as available. Restraints are ordered when the patient can do harm to him/herself by pulling out devices.     Critical Care Time: > 35 minutes excluding procedures    Romana Gonzalez DANIELA Albarado MD  3/18/2022  1:16 PM

## 2022-03-18 NOTE — PROGRESS NOTES
acetaminophen    OBJECTIVE:  BP (!) 110/58   Pulse 56   Temp 98.6 °F (37 °C) (Bladder)   Resp 27   Ht 5' 4\" (1.626 m)   Wt (!) 330 lb 3.2 oz (149.8 kg)   LMP  (LMP Unknown)   SpO2 100%   BMI 56.68 kg/m²   Temp  Av.7 °F (37.6 °C)  Min: 98.6 °F (37 °C)  Max: 101.1 °F (38.4 °C)   Constitutional: The patient is lying in bed in the ICU. She is on a BiPAP. She is restless. Mumbling unintelligibly. Skin: Warm and dry. No rashes were noted. HEENT: Eyes show round, and reactive pupils. No jaundice. Moist mucous membranes, no ulcerations, no thrush. Neck: Supple to movements. No lymphadenopathy. Chest: No respiratory distress. No crackles. Cardiovascular: Heart sounds rhythmic and regular. Abdomen: Positive bowel heart sounds rhythmic and regular. Sounds to auscultation. Benign to palpation. Large and pendulous. Intertrigo improved. Extremities: Bilateral lower extremity moderate to severe lymphedema. No erythema. Lines: Left IJ TLC 3/8/2022.   Hansen catheter    Laboratory and Tests Review:  Lab Results   Component Value Date    WBC 9.6 2022    WBC 7.9 2022    WBC 9.1 2022    HGB 7.6 (L) 2022    HCT 26.7 (L) 2022    MCV 87.0 2022     2022     Lab Results   Component Value Date    NEUTROABS 6.77 2022    NEUTROABS 7.11 2022    NEUTROABS 6.55 2022     Lab Results   Component Value Date    CRP 42.1 (H) 2022    CRP 13.7 (H) 2022    CRP 5.3 (H) 2021     No results found for: CRPHS  Lab Results   Component Value Date    SEDRATE 98 (H) 2022    SEDRATE 30 (H) 2022    SEDRATE 41 (H) 2021     Lab Results   Component Value Date    ALT 45 (H) 2022    AST 18 2022    ALKPHOS 102 2022    BILITOT 0.3 2022     Lab Results   Component Value Date     2022    K 3.8 2022    K 3.6 2022     2022    CO2 34 2022    BUN 21 2022    CREATININE 0.7 03/18/2022    CREATININE 1.0 03/17/2022    CREATININE 0.9 03/16/2022    GFRAA >60 03/18/2022    LABGLOM >60 03/18/2022    GLUCOSE 134 03/18/2022    GLUCOSE 181 12/16/2011    PROT 6.3 03/18/2022    LABALBU 2.8 03/18/2022    LABALBU 4.5 11/02/2011    CALCIUM 7.8 03/18/2022    BILITOT 0.3 03/18/2022    ALKPHOS 102 03/18/2022    AST 18 03/18/2022    ALT 45 03/18/2022     Radiology:  Reviewed    Microbiology:   Rapid SARS-CoV-2: Negative  Respiratory panel: Negative  Blood cultures 3/8/2022: Group G Streptococcus in 4 of 4 bottles  Blood cultures 3/9/2022: Negative x2  Leg wound culture 3/8/2022: Proteus, MRSA, CoNS nonhemolytic Streptococcus  Nares screen MRSA: Positive    ASSESSMENT:  · Cellulitis right lower extremity. Wound cultures grew mixed organisms, including nonhemolytic Streptococcus, Proteus, MRSA and CoNS  · Group G Streptococcus septicemia probably associated to cellulitis. This has resolved. Repeat blood cultures were negative  · Possible intra-abdominal infection. Possible cholecystitis  · Sepsis with septic shock, improved  · Acute respiratory failure   · Leukocytosis, resolved  · Elevation of transaminases. Possible cholecystitis. They seem to be trending downward. Ultrasound shows gallstones and edematous gallbladder wall  · Probable drug-induced fever. She has a pattern of nonremitting fever. She is only had 1 normal temperature in the last few days    PLAN:  · Stop Linezolid to see if temperatures normalize  · Start Cephalexin and Doxycycline, day 10 of 14 appropriate treatment  · We will follow with you    Spoke with nursing. Discussed with Dr. Evelyn Gray.     Rebeka Gomez MD  9:10 AM  3/18/2022

## 2022-03-19 ENCOUNTER — APPOINTMENT (OUTPATIENT)
Dept: GENERAL RADIOLOGY | Age: 67
DRG: 870 | End: 2022-03-19
Payer: MEDICARE

## 2022-03-19 LAB
ALBUMIN SERPL-MCNC: 2.6 G/DL (ref 3.5–5.2)
ALP BLD-CCNC: 95 U/L (ref 35–104)
ALT SERPL-CCNC: 34 U/L (ref 0–32)
ANION GAP SERPL CALCULATED.3IONS-SCNC: 6 MMOL/L (ref 7–16)
AST SERPL-CCNC: 23 U/L (ref 0–31)
B.E.: 9.2 MMOL/L (ref -3–3)
B.E.: 9.5 MMOL/L (ref -3–3)
BASOPHILS ABSOLUTE: 0.04 E9/L (ref 0–0.2)
BASOPHILS RELATIVE PERCENT: 0.4 % (ref 0–2)
BILIRUB SERPL-MCNC: 0.4 MG/DL (ref 0–1.2)
BUN BLDV-MCNC: 20 MG/DL (ref 6–23)
CALCIUM SERPL-MCNC: 7.9 MG/DL (ref 8.6–10.2)
CHLORIDE BLD-SCNC: 103 MMOL/L (ref 98–107)
CO2: 35 MMOL/L (ref 22–29)
COHB: 0.9 % (ref 0–1.5)
COHB: 1.6 % (ref 0–1.5)
CREAT SERPL-MCNC: 0.8 MG/DL (ref 0.5–1)
CRITICAL: ABNORMAL
CRITICAL: ABNORMAL
DATE ANALYZED: ABNORMAL
DATE ANALYZED: ABNORMAL
DATE OF COLLECTION: ABNORMAL
DATE OF COLLECTION: ABNORMAL
EOSINOPHILS ABSOLUTE: 0.13 E9/L (ref 0.05–0.5)
EOSINOPHILS RELATIVE PERCENT: 1.2 % (ref 0–6)
FIO2: 35 %
GFR AFRICAN AMERICAN: >60
GFR NON-AFRICAN AMERICAN: >60 ML/MIN/1.73
GLUCOSE BLD-MCNC: 231 MG/DL (ref 74–99)
HCO3: 33.6 MMOL/L (ref 22–26)
HCO3: 33.9 MMOL/L (ref 22–26)
HCT VFR BLD CALC: 27 % (ref 34–48)
HEMOGLOBIN: 7.9 G/DL (ref 11.5–15.5)
HHB: 2.1 % (ref 0–5)
HHB: 2.4 % (ref 0–5)
IMMATURE GRANULOCYTES #: 0.07 E9/L
IMMATURE GRANULOCYTES %: 0.6 % (ref 0–5)
LAB: ABNORMAL
LAB: ABNORMAL
LACTIC ACID: 1.4 MMOL/L (ref 0.5–2.2)
LYMPHOCYTES ABSOLUTE: 1.92 E9/L (ref 1.5–4)
LYMPHOCYTES RELATIVE PERCENT: 17.1 % (ref 20–42)
Lab: ABNORMAL
Lab: ABNORMAL
MAGNESIUM: 2.2 MG/DL (ref 1.6–2.6)
MCH RBC QN AUTO: 25.1 PG (ref 26–35)
MCHC RBC AUTO-ENTMCNC: 29.3 % (ref 32–34.5)
MCV RBC AUTO: 85.7 FL (ref 80–99.9)
METER GLUCOSE: 202 MG/DL (ref 74–99)
METER GLUCOSE: 214 MG/DL (ref 74–99)
METER GLUCOSE: 233 MG/DL (ref 74–99)
METER GLUCOSE: 233 MG/DL (ref 74–99)
METER GLUCOSE: 236 MG/DL (ref 74–99)
METER GLUCOSE: 247 MG/DL (ref 74–99)
METHB: 0.3 % (ref 0–1.5)
METHB: 0.3 % (ref 0–1.5)
MODE: ABNORMAL
MODE: ABNORMAL
MONOCYTES ABSOLUTE: 0.71 E9/L (ref 0.1–0.95)
MONOCYTES RELATIVE PERCENT: 6.3 % (ref 2–12)
NEUTROPHILS ABSOLUTE: 8.35 E9/L (ref 1.8–7.3)
NEUTROPHILS RELATIVE PERCENT: 74.4 % (ref 43–80)
O2 CONTENT: 11.6 ML/DL
O2 CONTENT: 13.9 ML/DL
O2 SATURATION: 97.6 % (ref 92–98.5)
O2 SATURATION: 97.9 % (ref 92–98.5)
O2HB: 95.7 % (ref 94–97)
O2HB: 96.7 % (ref 94–97)
OPERATOR ID: 516
OPERATOR ID: ABNORMAL
PATIENT TEMP: 37 C
PATIENT TEMP: 37 C
PCO2: 45.5 MMHG (ref 35–45)
PCO2: 45.8 MMHG (ref 35–45)
PDW BLD-RTO: 16.4 FL (ref 11.5–15)
PFO2: 2.75 MMHG/%
PH BLOOD GAS: 7.49 (ref 7.35–7.45)
PH BLOOD GAS: 7.49 (ref 7.35–7.45)
PHOSPHORUS: 2.6 MG/DL (ref 2.5–4.5)
PLATELET # BLD: 369 E9/L (ref 130–450)
PMV BLD AUTO: 10.5 FL (ref 7–12)
PO2: 109.5 MMHG (ref 75–100)
PO2: 96.4 MMHG (ref 75–100)
POTASSIUM SERPL-SCNC: 3.7 MMOL/L (ref 3.5–5)
RBC # BLD: 3.15 E12/L (ref 3.5–5.5)
SODIUM BLD-SCNC: 144 MMOL/L (ref 132–146)
SOURCE, BLOOD GAS: ABNORMAL
SOURCE, BLOOD GAS: ABNORMAL
THB: 10.1 G/DL (ref 11.5–16.5)
THB: 8.5 G/DL (ref 11.5–16.5)
TIME ANALYZED: 1408
TIME ANALYZED: 619
TOTAL PROTEIN: 5.8 G/DL (ref 6.4–8.3)
TSH SERPL DL<=0.05 MIU/L-ACNC: 3.16 UIU/ML (ref 0.27–4.2)
WBC # BLD: 11.2 E9/L (ref 4.5–11.5)

## 2022-03-19 PROCEDURE — 6370000000 HC RX 637 (ALT 250 FOR IP): Performed by: STUDENT IN AN ORGANIZED HEALTH CARE EDUCATION/TRAINING PROGRAM

## 2022-03-19 PROCEDURE — 2580000003 HC RX 258: Performed by: INTERNAL MEDICINE

## 2022-03-19 PROCEDURE — 6370000000 HC RX 637 (ALT 250 FOR IP): Performed by: INTERNAL MEDICINE

## 2022-03-19 PROCEDURE — 94660 CPAP INITIATION&MGMT: CPT

## 2022-03-19 PROCEDURE — 83605 ASSAY OF LACTIC ACID: CPT

## 2022-03-19 PROCEDURE — 85025 COMPLETE CBC W/AUTO DIFF WBC: CPT

## 2022-03-19 PROCEDURE — 2500000003 HC RX 250 WO HCPCS: Performed by: INTERNAL MEDICINE

## 2022-03-19 PROCEDURE — 2700000000 HC OXYGEN THERAPY PER DAY

## 2022-03-19 PROCEDURE — 82805 BLOOD GASES W/O2 SATURATION: CPT

## 2022-03-19 PROCEDURE — 80053 COMPREHEN METABOLIC PANEL: CPT

## 2022-03-19 PROCEDURE — 83735 ASSAY OF MAGNESIUM: CPT

## 2022-03-19 PROCEDURE — 2000000000 HC ICU R&B

## 2022-03-19 PROCEDURE — 6360000002 HC RX W HCPCS: Performed by: INTERNAL MEDICINE

## 2022-03-19 PROCEDURE — 82962 GLUCOSE BLOOD TEST: CPT

## 2022-03-19 PROCEDURE — 94640 AIRWAY INHALATION TREATMENT: CPT

## 2022-03-19 PROCEDURE — 36415 COLL VENOUS BLD VENIPUNCTURE: CPT

## 2022-03-19 PROCEDURE — 36600 WITHDRAWAL OF ARTERIAL BLOOD: CPT

## 2022-03-19 PROCEDURE — 71045 X-RAY EXAM CHEST 1 VIEW: CPT

## 2022-03-19 PROCEDURE — C9113 INJ PANTOPRAZOLE SODIUM, VIA: HCPCS | Performed by: INTERNAL MEDICINE

## 2022-03-19 PROCEDURE — 84443 ASSAY THYROID STIM HORMONE: CPT

## 2022-03-19 PROCEDURE — 84100 ASSAY OF PHOSPHORUS: CPT

## 2022-03-19 PROCEDURE — 6370000000 HC RX 637 (ALT 250 FOR IP): Performed by: SPECIALIST

## 2022-03-19 PROCEDURE — 99233 SBSQ HOSP IP/OBS HIGH 50: CPT | Performed by: INTERNAL MEDICINE

## 2022-03-19 RX ORDER — METOPROLOL TARTRATE 50 MG/1
100 TABLET, FILM COATED ORAL 2 TIMES DAILY
Status: DISCONTINUED | OUTPATIENT
Start: 2022-03-19 | End: 2022-03-19

## 2022-03-19 RX ORDER — METOPROLOL TARTRATE 50 MG/1
100 TABLET, FILM COATED ORAL 2 TIMES DAILY
Status: DISCONTINUED | OUTPATIENT
Start: 2022-03-19 | End: 2022-03-20

## 2022-03-19 RX ORDER — METOPROLOL SUCCINATE 100 MG/1
100 TABLET, EXTENDED RELEASE ORAL 2 TIMES DAILY
Status: DISCONTINUED | OUTPATIENT
Start: 2022-03-19 | End: 2022-03-19

## 2022-03-19 RX ADMIN — OXYCODONE HYDROCHLORIDE 10 MG: 5 SOLUTION ORAL at 13:47

## 2022-03-19 RX ADMIN — BUDESONIDE 500 MCG: 0.5 SUSPENSION RESPIRATORY (INHALATION) at 20:00

## 2022-03-19 RX ADMIN — CHLORHEXIDINE GLUCONATE 0.12% ORAL RINSE 15 ML: 1.2 LIQUID ORAL at 08:26

## 2022-03-19 RX ADMIN — ARFORMOTEROL TARTRATE 15 MCG: 15 SOLUTION RESPIRATORY (INHALATION) at 08:42

## 2022-03-19 RX ADMIN — INSULIN LISPRO 6 UNITS: 100 INJECTION, SOLUTION INTRAVENOUS; SUBCUTANEOUS at 17:12

## 2022-03-19 RX ADMIN — INSULIN LISPRO 6 UNITS: 100 INJECTION, SOLUTION INTRAVENOUS; SUBCUTANEOUS at 06:00

## 2022-03-19 RX ADMIN — CEPHALEXIN 1000 MG: 500 CAPSULE ORAL at 14:53

## 2022-03-19 RX ADMIN — DEXTROSE MONOHYDRATE 15 MG/HR: 50 INJECTION, SOLUTION INTRAVENOUS at 02:34

## 2022-03-19 RX ADMIN — METOPROLOL TARTRATE 100 MG: 50 TABLET, FILM COATED ORAL at 20:49

## 2022-03-19 RX ADMIN — DOCUSATE SODIUM LIQUID 100 MG: 100 LIQUID ORAL at 20:48

## 2022-03-19 RX ADMIN — MICONAZOLE NITRATE: 20 POWDER TOPICAL at 08:25

## 2022-03-19 RX ADMIN — SODIUM CHLORIDE, PRESERVATIVE FREE 10 ML: 5 INJECTION INTRAVENOUS at 08:25

## 2022-03-19 RX ADMIN — IPRATROPIUM BROMIDE AND ALBUTEROL SULFATE 1 AMPULE: 2.5; .5 SOLUTION RESPIRATORY (INHALATION) at 14:06

## 2022-03-19 RX ADMIN — INSULIN LISPRO 6 UNITS: 100 INJECTION, SOLUTION INTRAVENOUS; SUBCUTANEOUS at 12:30

## 2022-03-19 RX ADMIN — METOPROLOL TARTRATE 75 MG: 50 TABLET, FILM COATED ORAL at 08:24

## 2022-03-19 RX ADMIN — INSULIN LISPRO 6 UNITS: 100 INJECTION, SOLUTION INTRAVENOUS; SUBCUTANEOUS at 08:28

## 2022-03-19 RX ADMIN — SODIUM CHLORIDE, PRESERVATIVE FREE 10 ML: 5 INJECTION INTRAVENOUS at 15:33

## 2022-03-19 RX ADMIN — INSULIN GLARGINE 30 UNITS: 100 INJECTION, SOLUTION SUBCUTANEOUS at 08:28

## 2022-03-19 RX ADMIN — HYDROCORTISONE SODIUM SUCCINATE 25 MG: 100 INJECTION, POWDER, FOR SOLUTION INTRAMUSCULAR; INTRAVENOUS at 13:47

## 2022-03-19 RX ADMIN — DOXYCYCLINE HYCLATE 100 MG: 100 CAPSULE ORAL at 08:25

## 2022-03-19 RX ADMIN — DOCUSATE SODIUM LIQUID 100 MG: 100 LIQUID ORAL at 08:25

## 2022-03-19 RX ADMIN — CEPHALEXIN 1000 MG: 500 CAPSULE ORAL at 22:53

## 2022-03-19 RX ADMIN — DOXYCYCLINE HYCLATE 100 MG: 100 CAPSULE ORAL at 20:49

## 2022-03-19 RX ADMIN — ENOXAPARIN SODIUM 135 MG: 150 INJECTION SUBCUTANEOUS at 08:24

## 2022-03-19 RX ADMIN — PANTOPRAZOLE SODIUM 40 MG: 40 INJECTION, POWDER, FOR SOLUTION INTRAVENOUS at 08:23

## 2022-03-19 RX ADMIN — DEXTROSE MONOHYDRATE 12.5 MG/HR: 50 INJECTION, SOLUTION INTRAVENOUS at 09:42

## 2022-03-19 RX ADMIN — DEXTROSE MONOHYDRATE 7.5 MG/HR: 50 INJECTION, SOLUTION INTRAVENOUS at 23:33

## 2022-03-19 RX ADMIN — ARFORMOTEROL TARTRATE 15 MCG: 15 SOLUTION RESPIRATORY (INHALATION) at 20:00

## 2022-03-19 RX ADMIN — IPRATROPIUM BROMIDE AND ALBUTEROL SULFATE 1 AMPULE: 2.5; .5 SOLUTION RESPIRATORY (INHALATION) at 08:42

## 2022-03-19 RX ADMIN — CHLORHEXIDINE GLUCONATE 0.12% ORAL RINSE 15 ML: 1.2 LIQUID ORAL at 20:52

## 2022-03-19 RX ADMIN — BUMETANIDE 1 MG: 0.25 INJECTION INTRAMUSCULAR; INTRAVENOUS at 20:49

## 2022-03-19 RX ADMIN — INSULIN LISPRO 6 UNITS: 100 INJECTION, SOLUTION INTRAVENOUS; SUBCUTANEOUS at 00:33

## 2022-03-19 RX ADMIN — DIAZEPAM 5 MG: 5 TABLET ORAL at 23:51

## 2022-03-19 RX ADMIN — OXYCODONE HYDROCHLORIDE 10 MG: 5 SOLUTION ORAL at 03:01

## 2022-03-19 RX ADMIN — BUDESONIDE 500 MCG: 0.5 SUSPENSION RESPIRATORY (INHALATION) at 08:42

## 2022-03-19 RX ADMIN — ENOXAPARIN SODIUM 135 MG: 150 INJECTION SUBCUTANEOUS at 20:48

## 2022-03-19 RX ADMIN — DIAZEPAM 5 MG: 5 TABLET ORAL at 00:36

## 2022-03-19 RX ADMIN — NALOXEGOL OXALATE 25 MG: 12.5 TABLET, FILM COATED ORAL at 08:24

## 2022-03-19 RX ADMIN — INSULIN GLARGINE 30 UNITS: 100 INJECTION, SOLUTION SUBCUTANEOUS at 21:37

## 2022-03-19 RX ADMIN — OXYCODONE HYDROCHLORIDE 10 MG: 5 SOLUTION ORAL at 08:25

## 2022-03-19 RX ADMIN — QUETIAPINE FUMARATE 50 MG: 25 TABLET ORAL at 20:49

## 2022-03-19 RX ADMIN — SENNOSIDES 17.6 MG: 8.8 SYRUP ORAL at 22:56

## 2022-03-19 RX ADMIN — IPRATROPIUM BROMIDE AND ALBUTEROL SULFATE 1 AMPULE: 2.5; .5 SOLUTION RESPIRATORY (INHALATION) at 20:00

## 2022-03-19 RX ADMIN — OXYCODONE HYDROCHLORIDE 10 MG: 5 SOLUTION ORAL at 19:37

## 2022-03-19 RX ADMIN — SODIUM CHLORIDE, PRESERVATIVE FREE 10 ML: 5 INJECTION INTRAVENOUS at 20:53

## 2022-03-19 RX ADMIN — INSULIN LISPRO 6 UNITS: 100 INJECTION, SOLUTION INTRAVENOUS; SUBCUTANEOUS at 19:42

## 2022-03-19 RX ADMIN — QUETIAPINE FUMARATE 50 MG: 25 TABLET ORAL at 08:25

## 2022-03-19 RX ADMIN — DIAZEPAM 5 MG: 5 TABLET ORAL at 12:30

## 2022-03-19 RX ADMIN — DIAZEPAM 5 MG: 5 TABLET ORAL at 17:12

## 2022-03-19 RX ADMIN — BUMETANIDE 1 MG: 0.25 INJECTION INTRAMUSCULAR; INTRAVENOUS at 08:23

## 2022-03-19 RX ADMIN — MICONAZOLE NITRATE: 20 POWDER TOPICAL at 20:52

## 2022-03-19 ASSESSMENT — PAIN SCALES - GENERAL
PAINLEVEL_OUTOF10: 5
PAINLEVEL_OUTOF10: 5
PAINLEVEL_OUTOF10: 0
PAINLEVEL_OUTOF10: 0
PAINLEVEL_OUTOF10: 5
PAINLEVEL_OUTOF10: 5
PAINLEVEL_OUTOF10: 0

## 2022-03-19 ASSESSMENT — PAIN SCALES - WONG BAKER
WONGBAKER_NUMERICALRESPONSE: 6
WONGBAKER_NUMERICALRESPONSE: 4
WONGBAKER_NUMERICALRESPONSE: 6
WONGBAKER_NUMERICALRESPONSE: 4
WONGBAKER_NUMERICALRESPONSE: 6

## 2022-03-19 NOTE — PROGRESS NOTES
Subjective:  On BiPAP on my evaluation  Conversant, resting comfortably  Responding to questions adequately  Off Precedex and Versed   No acute events overnight  Objective:    BP (!) 144/71   Pulse 138   Temp 99.9 °F (37.7 °C) (Bladder)   Resp 29   Ht 5' 4\" (1.626 m)   Wt (!) 330 lb 3.2 oz (149.8 kg)   LMP  (LMP Unknown)   SpO2 98%   BMI 56.68 kg/m²     In: 853 [I.V.:553; NG/GT:300]  Out: 3210   In: 853   Out: 3210 [Urine:3210]    General appearance BiPAP in place awake, poor hygiene malodorous, morbid obesity  HEENT: AT/NC, MMM  Neck: FROM, supple  Lungs: Clear to auscultation  CV: RRR, no MRGs  Vasc: Radial pulses 2+  Abdomen: Soft, non-tender; no masses or HSM  Extremities: Ichthyosis bilateral lower extremities-bilateral lower extremities in dressing  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  *      Plan:    59-year-old woman with multiple comorbidities admitted to ICU setting for septic shock on triple pressor support, broad-spectrum IV antibiotic therapy, intubated sedated     Extubated 3/17/2022  Remains in ICU setting  IV fluid resuscitation for marked lactic acidosis-resolved  Off pressor support, start midodrine if needed  on Solu-Cortef 3 times daily-wean now the blood pressure is improved-contributing to marked elevation in blood sugars  -blood cultures with alpha hemolytic strep  Broad-spectrum IV antibiotics with infectious disease following-Merrem has been transitioned to Rocephin 3/9/2021-beta strep group G/ linezolid added-white count resolved  ruq us -hepatomegaly with diffuse fatty infiltration/gallstones and sludge  Glargine, insulin sliding scale for blood sugar management,     Amiodarone resumed now secondary to marked elevation and heart rate status post cardioversion for hemodynamic instability  Now on IV Cardizem drip for rate control-transition to p.o. metoprolol 100 p.o. twice daily  Echocardiogram completed-normal LV  Cardiology following    Daily labs     DVT Prophylaxis   PT/OT  Discharge planning     Jennifer Issa MD  11:22 AM  3/19/2022

## 2022-03-19 NOTE — PROGRESS NOTES
[address] [place] [address] [facility]  [facility]  Progress Note      Chief Complaint   Patient presents with    Cough     started a couple days ago    Hyperglycemia      per EMS    Chills    Nausea     Zofran given per EMS       Subjective: The patient off sedation  She is on a BiPAP. 35%  Afebrile. Review of systems:  A 10 point review of systems was done. As stated above, otherwise negative.     Medications:   amiodarone  150 mg IntraVENous Once    metoprolol tartrate  100 mg Oral BID    bumetanide  1 mg IntraVENous BID    cephALEXin  1,000 mg Oral 3 times per day    doxycycline hyclate  100 mg Oral 2 times per day    naloxegol  25 mg Oral QAM    ipratropium-albuterol  1 ampule Inhalation Q6H WA    Arformoterol Tartrate  15 mcg Nebulization BID    budesonide  0.5 mg Nebulization BID    diazePAM  5 mg Oral Q6H    oxyCODONE  10 mg Oral Q6H    insulin glargine  30 Units SubCUTAneous BID    sodium chloride flush  5-40 mL IntraVENous 2 times per day    heparin flush  3 mL IntraVENous 2 times per day    QUEtiapine  50 mg Oral BID    hydrocortisone sodium succinate PF  25 mg IntraVENous Q8H    insulin lispro  0-18 Units SubCUTAneous Q4H    docusate  100 mg Per NG tube BID    senna  10 mL Per NG tube BID    enoxaparin  1 mg/kg SubCUTAneous BID    chlorhexidine  15 mL Mouth/Throat BID    pantoprazole  40 mg IntraVENous Daily    miconazole   Topical BID      amiodarone      Followed by    amiodarone Stopped (03/19/22 1530)    dilTIAZem 7.5 mg/hr (03/19/22 1030)    dexmedetomidine (PRECEDEX) IV infusion Stopped (03/18/22 1424)    sodium chloride      fentaNYL 5 mcg/ml in 0.9%  ml infusion Stopped (03/17/22 1222)    dextrose      midazolam Stopped (03/17/22 1222)    sodium chloride 10 mL/hr at 03/18/22 1642     perflutren lipid microspheres, ipratropium-albuterol, sodium chloride flush, sodium chloride, heparin flush, white petrolatum, fentanNYL, dextrose bolus (hypoglycemia) **OR** dextrose bolus (hypoglycemia), glucose, glucagon (rDNA), dextrose, midazolam, acetaminophen, acetaminophen    OBJECTIVE:  BP (!) 144/71   Pulse 138   Temp 99.9 °F (37.7 °C) (Bladder)   Resp 29   Ht 5' 4\" (1.626 m)   Wt (!) 330 lb 3.2 oz (149.8 kg)   LMP  (LMP Unknown)   SpO2 98%   BMI 56.68 kg/m²   Temp  Av.6 °F (37.6 °C)  Min: 99 °F (37.2 °C)  Max: 99.9 °F (37.7 °C)   Constitutional: The patient is lying in bed in the ICU. She is on a BiPAP. She off sedation  Skin: Warm and dry. No rashes were noted. HEENT: Eyes show round, and reactive pupils. No jaundice. Moist mucous membranes, no ulcerations, no thrush. Neck: Supple to movements. No lymphadenopathy. Chest: No respiratory distress. No crackles. Cardiovascular: Heart sounds rhythmic and regular. Abdomen: Positive bowel heart sounds rhythmic and regular. Sounds to auscultation. Benign to palpation. Large and pendulous. Intertrigo improved. Extremities: Bilateral lower extremity moderate to severe lymphedema. No erythema. Lines: Left IJ TLC 3/8/2022.   Hansen catheter    Laboratory and Tests Review:  Lab Results   Component Value Date    WBC 11.2 2022    WBC 9.6 2022    WBC 7.9 2022    HGB 7.9 (L) 2022    HCT 27.0 (L) 2022    MCV 85.7 2022     2022     Lab Results   Component Value Date    NEUTROABS 8.35 (H) 2022    NEUTROABS 6.77 2022    NEUTROABS 7.11 2022     Lab Results   Component Value Date    CRP 42.1 (H) 2022    CRP 13.7 (H) 2022    CRP 5.3 (H) 2021     No results found for: CRPHS  Lab Results   Component Value Date    SEDRATE 98 (H) 2022    SEDRATE 30 (H) 2022    SEDRATE 41 (H) 2021     Lab Results   Component Value Date    ALT 34 (H) 2022    AST 23 2022    ALKPHOS 95 2022    BILITOT 0.4 2022     Lab Results   Component Value Date     2022    K 3.7 2022    K 3.6 03/16/2022     03/19/2022    CO2 35 03/19/2022    BUN 20 03/19/2022    CREATININE 0.8 03/19/2022    CREATININE 0.7 03/18/2022    CREATININE 1.0 03/17/2022    GFRAA >60 03/19/2022    LABGLOM >60 03/19/2022    GLUCOSE 231 03/19/2022    GLUCOSE 181 12/16/2011    PROT 5.8 03/19/2022    LABALBU 2.6 03/19/2022    LABALBU 4.5 11/02/2011    CALCIUM 7.9 03/19/2022    BILITOT 0.4 03/19/2022    ALKPHOS 95 03/19/2022    AST 23 03/19/2022    ALT 34 03/19/2022     Radiology:  Reviewed    Microbiology:   Rapid SARS-CoV-2: Negative  Respiratory panel: Negative  Blood cultures 3/8/2022: Group G Streptococcus in 4 of 4 bottles  Blood cultures 3/9/2022: Negative x2  Leg wound culture 3/8/2022: Proteus, MRSA, CoNS nonhemolytic Streptococcus  Nares screen MRSA: Positive    ASSESSMENT:  · Cellulitis right lower extremity. Wound cultures grew mixed organisms, including nonhemolytic Streptococcus, Proteus, MRSA and CoNS  · Group G Streptococcus septicemia probably associated to cellulitis. This has resolved. Repeat blood cultures were negative  · Possible intra-abdominal infection. Possible cholecystitis  · Sepsis with septic shock, improved  · Acute respiratory failure   · Leukocytosis, resolved  · Elevation of transaminases. Possible cholecystitis. They seem to be trending downward. Ultrasound shows gallstones and edematous gallbladder wall  · Probable drug-induced fever. She has a pattern of nonremitting fever.   She is only had 1 normal temperature in the last few days    PLAN:    · Cephalexin and Doxycycline, day 11 of 14   · We will follow with you  · Discussed with critical care        Ramila Suero MD  11:29 AM  3/19/2022

## 2022-03-19 NOTE — PROGRESS NOTES
Critical Care Team - Daily Progress Note         Date and time: 3/19/2022 12:59 PM  Patient's name:  Sapp[place] AGEE [place] [address] Record Number: [id number]  Patient's account/billing number: [de-identified]  Patient's YOB: 1955   Age: 74 y.o. Date of Admission: 3/8/2022  4:28 AM  Length of stay during current admission: 11      Primary Care Physician: Mojgan Deluna DO  ICU Attending Physician: Dr. Mcrae Mean Status: Full Code    Reason for ICU admission: Respiratory failure, shock      SUBJECTIVE:     OVERNIGHT EVENTS:    3/19: Patient with Afib RVR overnight, hx of Afib RVR. Denied any chest pain, alert and able to converse with staff per report. Cardiology on board and informed, placed on cardizem drip. Patient was remaining with tachycardia and ordered Amio drip. Prior to beginning amio, patient spontaneously converted to sinus rhythm. Patient denies any pain, on BiPAP. 3/18: Patient was extubated on 3/17. Patient is tolerating BiPAP well. She did have an episode overnight of agitation trying to pull off her BiPAP and nasal cannula but when she desaturated she understood that she needed to keep those on. She is still asking for water and food. 3/17: Vent day 10. Patient failed weaning trial again yesterday. Patient experienced episode of tachycardia that was found to be A. fib with RVR. Patient was cardioverted on 3/16. Patient was also started on amiodarone drip. This morning she is in sinus rhythm. Patient was agitated overnight. Patient's agitation resolved with oxycodone administration. Patient remains on Versed, fentanyl, Precedex. She is still on Seroquel. Tube feeds are still running at this time. 3/16: Vent day 9, patient attempted weaning trial yesterday without success. Patient with agitation reported over night Remains on Versed 5 mg , Fentanyl 200 mcg Precedex 1.5 mcg. On Seroquel. Tube feeds running at 50.     3/15: Vent day #8.   Patient was still agitated overnight. Patient was unable to be weaned off vent yesterday. We will likely try again today. She remains on Versed, fentanyl, Precedex. This morning she did experience a drop in her blood sugars, likely due to the decrease in steroids. Tube feeds are still running at this time. Lantus will be adjusted today. 3/14: Vent day #7. Overnight patient was extremely agitated. Patient pulled art line. Patient had to be sedated because she kept trying to pull out her ETT. She is currently on versed, fentanyl and precedex. Patient tolerated Seroquel well. 3/13: Patient did not experience any acute events overnight. She has been very agitated however and trying to pull at tube. She is still on pressors at this time. 3/12: Pressors decreased. Mentation has not been appropriate yet. Tolerating tube feeds. 3/11: Patient had no acute problems overnight. She is currently being weaned off of vaso and weaned off sedation. She has been persistently hyperglycemic. 3/10: Pressors were changed from levo to rachelle. She would awake intermittently and be agitated. Precedex was started. Patient is tolerating tube feeds as well. 3/9: Patient has converted to sinus rhythm, opening eyes with spells of anxious awakenings. Levophed stopped. Remains on Vent. 3/8: Patient presented to the ICU with hypotension and respiratory failure. Patient was started on pressors and antibiotics at that time. Patient was also started on amiodarone drip for her A. fib. Patient was also started on insulin drip.     CURRENT VENTILATION STATUS:     [] Ventilator  [x] BIPAP  [x] Nasal Cannula [] Room Air      IF INTUBATED, ET TUBE MARKING AT LOWER LIP:  cms    SECRETIONS  Amount:  [] Small [] Moderate  [] Large  [x] None  Color:     [] White [] Colored  [] Bloody    SEDATION:  RAAS Score:  [] Propofol gtt  [] Versed gtt  [] Fentanyl gtt [] Ativan gtt   [] Precedex    PARALYZED:  [x] No    [] Yes      VASOPRESSORS:  [x] No [] Yes    If yes -   [] Levophed       [] Dopamine     [] Vasopressin       [] Dobutamine  [] Phenylephrine         [] Epinephrine     CENTRAL LINES:     [x] No   [] Yes   (Date of Insertion: 3/8--> needs changed)           If yes -     [] Right IJ     [] Left IJ [] Right Femoral [] Left Femoral                   [] Right Subclavian [] Left Subclavian       BRAUN'S CATHETER:   [] No   [x] Yes  (Date of Insertion: )     URINE OUTPUT:            [x] Good   [] Low              [] Anuric      OBJECTIVE:     VITAL SIGNS:  BP (!) 130/59   Pulse 92   Temp 99.3 °F (37.4 °C) (Bladder)   Resp 29   Ht 5' 4\" (1.626 m)   Wt (!) 330 lb 3.2 oz (149.8 kg)   LMP  (LMP Unknown)   SpO2 97%   BMI 56.68 kg/m²   Tmax over 24 hours:  Temp (24hrs), Av.5 °F (37.5 °C), Min:99 °F (37.2 °C), Max:99.9 °F (37.7 °C)      Patient Vitals for the past 6 hrs:   BP Temp Temp src Pulse Resp SpO2   22 1200 (!) 130/59 99.3 °F (37.4 °C) Bladder 92 29 97 %   22 1100 129/63 -- -- 83 29 97 %   22 1000 (!) 124/58 -- -- 87 (!) 35 97 %   22 0900 (!) 144/71 -- -- 138 29 98 %   22 0839 -- -- -- -- 29 --   22 0800 105/63 99.9 °F (37.7 °C) Bladder 164 23 93 %   22 0700 112/72 -- -- 161 (!) 36 (!) 82 %         Intake/Output Summary (Last 24 hours) at 3/19/2022 1259  Last data filed at 3/19/2022 1200  Gross per 24 hour   Intake 703.04 ml   Output 3685 ml   Net -2981.96 ml     Wt Readings from Last 2 Encounters:   22 (!) 330 lb 3.2 oz (149.8 kg)   21 (!) 304 lb (137.9 kg)     Body mass index is 56.68 kg/m². PHYSICAL EXAMINATION:    Physical Exam  Constitutional:       Appearance: She is morbidly obese. She is ill-appearing. On BiPaP mask      Interventions: She is awake. HENT:      Head: Normocephalic and atraumatic. Eyes:      Conjunctiva/sclera: Conjunctivae normal.   Neck:      Trachea: No tracheal deviation.    Cardiovascular:      Rate and Rhythm: Fast rate, irregular rhythm     Heart sounds: Normal heart sounds. Pulmonary:      Effort: Pulmonary effort is normal. BiPAP on. Breath sounds: Decreased breath sounds present, improving  Abdominal:      General: Bowel sounds are normal.      Palpations: Abdomen is soft. Tenderness: There is no abdominal tenderness. Musculoskeletal:      Cervical back: Neck supple. Right lower leg: Edema present. Left lower leg: Edema present. Lymphadenopathy:      Cervical: No cervical adenopathy. Skin:     General: Skin is warm and dry. Findings: No rash. Comments: +bl lower extremity lymphedema chronic skin changes with right leg fissure erythma   Neurological:      General: No focal deficit present.    Psychiatric:         Behavior: Behavior normal.      Any additional physical findings: n/a    MEDICATIONS:    Scheduled Meds:   amiodarone  150 mg IntraVENous Once    metoprolol tartrate  100 mg Oral BID    bumetanide  1 mg IntraVENous BID    cephALEXin  1,000 mg Oral 3 times per day    doxycycline hyclate  100 mg Oral 2 times per day    naloxegol  25 mg Oral QAM    ipratropium-albuterol  1 ampule Inhalation Q6H WA    Arformoterol Tartrate  15 mcg Nebulization BID    budesonide  0.5 mg Nebulization BID    diazePAM  5 mg Oral Q6H    oxyCODONE  10 mg Oral Q6H    insulin glargine  30 Units SubCUTAneous BID    sodium chloride flush  5-40 mL IntraVENous 2 times per day    heparin flush  3 mL IntraVENous 2 times per day    QUEtiapine  50 mg Oral BID    hydrocortisone sodium succinate PF  25 mg IntraVENous Q8H    insulin lispro  0-18 Units SubCUTAneous Q4H    docusate  100 mg Per NG tube BID    senna  10 mL Per NG tube BID    enoxaparin  1 mg/kg SubCUTAneous BID    chlorhexidine  15 mL Mouth/Throat BID    pantoprazole  40 mg IntraVENous Daily    miconazole   Topical BID     Continuous Infusions:   amiodarone      Followed by    amiodarone Stopped (03/19/22 1530)    dilTIAZem 7.5 mg/hr (03/19/22 1030)    dexmedetomidine (PRECEDEX) IV infusion Stopped (03/18/22 1424)    sodium chloride      fentaNYL 5 mcg/ml in 0.9%  ml infusion Stopped (03/17/22 1222)    dextrose      midazolam Stopped (03/17/22 1222)    sodium chloride 10 mL/hr at 03/18/22 1642     PRN Meds:   perflutren lipid microspheres, 1.5 mL, ONCE PRN  ipratropium-albuterol, 1 ampule, Q4H PRN  sodium chloride flush, 5-40 mL, PRN  sodium chloride, 25 mL, PRN  heparin flush, 3 mL, PRN  white petrolatum, , BID PRN  fentanNYL, 25 mcg, Q2H PRN  dextrose bolus (hypoglycemia), 125 mL, PRN   Or  dextrose bolus (hypoglycemia), 250 mL, PRN  glucose, 15 g, PRN  glucagon (rDNA), 1 mg, PRN  dextrose, 100 mL/hr, PRN  midazolam, 2 mg, Q3H PRN  acetaminophen, 650 mg, Q4H PRN  acetaminophen, 650 mg, Q4H PRN          VENT SETTINGS (Comprehensive) (if applicable):  Vent Information  $Ventilation: Off Vent  Skin Assessment: Clean, dry, & intact  Equipment ID: [id number]  Vent Type: [id number]  Vent Mode: (S) AC/PC  Vt Ordered: 450 mL  Pressure Ordered: (S) 30  Rate Set: 0 bmp  Peak Flow: 0 L/min  Pressure Support: 0 cmH20  FiO2 : 35 %  SpO2: 97 %  SpO2/FiO2 ratio: 265.71  Sensitivity: 2  PEEP/CPAP: 5  I Time/ I Time %: 0.9 s  Humidification Source: Heated wire  Humidification Temp: 37  Humidification Temp Measured: 37.1  Circuit Condensation: Drained  Mask Type: Full face mask  Mask Size: Medium  Additional Respiratory  Assessments  Pulse: 92  Resp: 29  SpO2: 97 %  Position: Semi-Chapin's  Humidification Source: Heated wire  Humidification Temp: 37  Circuit Condensation: Drained  Oral Care: Mouth swabbed,Mouth suctioned,Lip moisturizer applied  Subglottic Suction Done?: Yes  Cuff Pressure (cm H2O): 29 cm H2O    ABGs:   Recent Labs     03/19/22  0619   PH 7.486*   PCO2 45.5*   PO2 96.4   HCO3 33.6*   BE 9.2*   O2SAT 97.6       Laboratory findings:    Complete Blood Count:   Recent Labs     03/17/22  0500 03/18/22  0430 03/19/22  0538   WBC 7.9 9.6 11.2   HGB 7.9* 7.6* 7.9* HCT 27.3* 26.7* 27.0*    276 369        Last 3 Blood Glucose:   Recent Labs     03/17/22  0500 03/18/22  0430 03/19/22  0538   GLUCOSE 194* 134* 231*        PT/INR:    Lab Results   Component Value Date    PROTIME 19.5 04/29/2021    INR 1.8 04/29/2021     PTT:    Lab Results   Component Value Date    APTT 36.3 04/29/2021       Comprehensive Metabolic Profile:   Recent Labs     03/17/22  0500 03/17/22  0500 03/18/22  0430 03/18/22  0430 03/19/22  0538     --  141  --  144   K 4.5  --  3.8  --  3.7     --  100  --  103   CO2 34*  --  34*  --  35*   BUN 23  --  21  --  20   CREATININE 1.0  --  0.7  --  0.8   GLUCOSE 194*  --  134*  --  231*   CALCIUM 7.7*  --  7.8*  --  7.9*   PROT 6.2*   < > 6.3*   < > 5.8*   LABALBU 2.7*   < > 2.8*   < > 2.6*   BILITOT 0.3   < > 0.3   < > 0.4   ALKPHOS 112*   < > 102   < > 95   AST 17   < > 18   < > 23   ALT 55*   < > 45*   < > 34*    < > = values in this interval not displayed. Magnesium:   Lab Results   Component Value Date    MG 2.2 03/19/2022     Phosphorus:   Lab Results   Component Value Date    PHOS 2.6 03/19/2022     Ionized Calcium:   Lab Results   Component Value Date    CAION 1.37 10/24/2016        Urinalysis: No results found for: UA     Troponin: No results for input(s): TROPONINI in the last 72 hours. Microbiology:   Culture, Blood 2   Date Value Ref Range Status   03/15/2022 24 Hours no growth  Preliminary       Cultures during this admission:     Blood cultures:  [] None drawn      [x] Negative             [x]  Positive (Details: 3/8 beta strep group G, repeats 3/9 negative)  Urine Culture:   [] None drawn      [x] Negative             []  Positive (Details:  )  Sputum Culture:  [x] None drawn      [] Negative             []  Positive (Details:  )   Wound Culture:  [] None drawn      [] Negative             [x]  Positive (Details: S.  Aureus, Proteus sp 3/8  )     Other pertinent Labs:        Radiology/Imaging:     Chest Xray (3/19/2022): Improving aeration.  Continued follow-up recommended. ASSESSMENT:     Principal Problem:    Sepsis ([address]  Resolved Problems:    * No resolved hospital problems. *      PLAN:     WEAN PER PROTOCOL:  [] No   [x] Yes  [] N/A    DISCONTINUE ANY LABS:   [x] No   [] Yes    ICU PROPHYLAXIS:  Stress ulcer:  [x] PPI Agent  [] [id number] [] Sucralfate  [] Other:  VTE:   [] Enoxaparin  [] Unfract. Heparin Subcut  [] EPC Cuffs    NUTRITION:  [x] NPO [x] Tube Feeding (Specify: ) [] TPN  [] PO (Diet: Diet NPO  ADULT TUBE FEEDING; Nasogastric; Diabetic; Continuous; 10; Yes; 15; Q 8 hours; 50; 75; Q 4 hours)    HOME MEDICATIONS RECONCILED: [] No  [x] Yes    INSULIN DRIP:   [x] No   [] Yes    CONSULTATION NEEDED:  [x] No   [] Yes    FAMILY UPDATED:    [] No   [x] Yes    TRANSFER OUT OF ICU:   [] No   [] Yes    SYSTEMS ASSESSMENT    Neuro     Extubated on 3/17  OFF Fentanyl, Versed  Agitated at times    Agitation   Seroquel 50 mg BID  Precedex discontinued  Valium 5 mg every 6 hours  Oxycodone 10 mg every 6 hours    Respiratory     Metabolic Alkalosis with Respiratory Acidosis  S/P 10 day intubation, Extubated 3/17  Alternating between BiPAP and 6L NC   Home O2 requirements, 6L O2 NC  Wean oxygen as tolerated.  Keep O2 sat 90-92%  Pulmonology following, appreciate input    Cardiovascular     Paroxysmal atrial fibrillation  On Cardizem drip   Amio drip ordered, bu currently held as patient in Sinus   Increase Metoprolol to 100 mg BID per cardiology   Cardiology Consulted  Continue to monitor  Therapeutic Lovenox  S/p Cardioversion 3/16     Septic shock  Solu-Cortef 25 mg Q8H  Blood cultures positive Beta strep group G 3/8, repeats negative  Wound culture 3/8 Staph aureus and Proteus sp  Patient remains febrile  IV NS 20 mL/hr  See infectious disease below    Fluid overload  Bumex 1 mg twice daily  Strict I's and O's  Continue to monitor    Gastrointestinal     Possible Cholecystitis  Ultrasound 3/11 gallstones and edematous gallbladder wall  Consider surgery consult    GI PPx  Protonix 40 mg IV daily    Bowel Regimen  Colace 100 mg BID  Senna daily    Renal     CKD stage III, stable  Baseline creatinine 1.1-1.2  Continue to monitor    Electrolyte abnormalities  Replete as needed  Continue to monitor     Infectious Disease     Group G Streptococcus Septicemia  Likely 2/2 to cellulitis of leg (however, cultures grew Proteus)  Blood cultures positive x2 for beta strep group G  S/P Ceftriaxone 2g Q24H (completed 7 days with 1 day of cefepime prior)  Linezolid 600 mg Q12H (Day 7) discontinue  On Cephalexin and Doxycycline   ID following, appreciate input    Leukocytosis, resolved  Likely secondary to infection and steroid use  Continue to monitor    Febrile, resolved  Tylenol suppository every 4 hours as needed for fever    Hematology/Oncology     Anticoagulation  Lovenox 135 mg twice daily      Acute on Chronic Normocytic Anemia  Baseline Hgb 9  Continue to monitor  Transfuse for Hgb <7    Endocrine     T2DM  Lantus to 30 units twice daily  HDSSI  Hypoglycemia protocol  POC glucose checks    Social/Spiritual/DNR/Other     Full cod e  will try to plan for possible LTAC if patient able to remain stable for > 24 hours    Rebecca Bo DO, PGY-1            3/19/2022, 12:59 PM    Attending Physician Attestation: Dr. Severa Mallet    Thank you very much for allowing me to see this patient in consultation and follow up. I personally saw, examined and provided care for the patient. Radiographs, labs and medication list were reviewed by me independently. I spoke with bedside nursing, respiratory therapists and consultants. Critical care services and times documented are independent of procedures and multidisciplinary rounds with Residents. Additionally comprehensive, multidisciplinary rounds were conducted with the MICU team. The case was discussed in detail and plans for care were established.  Review of Residents documentation was conducted and revisions were made as appropriate. I agree with the the above documented information.      Physical Examination:  Vitals:   Vitals:    03/19/22 0900 03/19/22 1000 03/19/22 1100 03/19/22 1200   BP: (!) 144/71 (!) 124/58 129/63 (!) 130/59   Pulse: 138 87 83 92   Resp: 29 (!) 35 29 29   Temp:    99.3 °F (37.4 °C)   TempSrc:    Bladder   SpO2: 98% 97% 97% 97%   Weight:       Height:          General: No Acute Distress  HEENT: normocephalic, atraumatic  - no audible stridor in neck anteriorly   CVS: RRR, S1, S2, No S3 or S4  Abdomen: soft, NT, ND  Respiratory: decreased breath sounds at lung bases, no focal wheezes noted  Extremities: no clubbing/cyanosis/edema  Neuro: AVAPS, phonates     ASSESSMENT:  · Severe Sepsis with Septic Shock - lower extremity cellulitis, Grp G Streptococcus bacteremia/septicemia   · Probable cellulitis right lower extremity  · Group G Streptococcus septicemia probably associated to cellulitis cultures of the leg, however, grew Proteus  · Possible intra-abdominal infection.  Possible cholecystitis  · Acute Respiratory Failure - on ACVC mechanical ventilation    · Leukocytosis, improving  · Chronic Hypoxic Respiratory Failure - baseline 6L O2 NC  · Moderate/Severe COPD  · Elevation of transaminases.  Possible cholecystitis.  They seem to be trending downward.  Ultrasound shows gallstones and edematous gallbladder wall  · Morbid Obesity - BMI 51  · Atrial Fibrillation with RVR - DCCV x 1 cardioversion 3/16/2022, cardizem and amiodarone given, with initiation of amiodarone drip    · Chronic Diastolic CHF Dysfunction   · Chronic Lymphedema of the Lower Extremities      In addition the following applies:     Check: TSH  Medication Alterations: amiodarone, metoprolol, D/C solu-cortef    Procedures: N/A  Imaging: reviewed  New Consultations: N/A  VENT: N/A  - s/p extubation 3/17/2022 after 10 days on mechanical ventilation ACVC/ACPC     Access: Left IJ TLC   Consults: ID, Dietary, Cardiology    Drips: Precedex  Nutrition: Tube Feeds  ABX: Linezolid  Completed ABX: Ceftriaxone     - DCCV x 1 cardioversion 3/16/2022, cardizem and amiodarone given, with initiation of amiodarone drip    - liberation from vent to AVAPS on 3/17/2022, family present and updated at bedside  - 11L + at this time as of 3/19/2022, diuresis, supportive care  - patient already on therapeutic anticoagulation with lovenox subcutaneous   - OXY IR and Valium PO  - metoprolol to 100 mg PO BID then future transition to toprol XL  - IV amiodarone per cardiology   - TSH 3/160  - untreated and undiagnosed SAM likely, outpatient PSG and PFT will be warranted for patient   - consideration to [facility] placement for oxygen wean/BIPAP/AVAPS  - patient to remain in ICU level of care at this time    Thank you for allowing me to participate in the care of this patient. Care reviewed with nursing staff, medical and surgical specialty care, primary care and the patient's family as available. Restraints are ordered when the patient can do harm to him/herself by pulling out devices. Critical Care Time: > 35 minutes excluding procedures    Najma Heart M.D.     Najma Heart MD  3/19/2022  1:57 PM

## 2022-03-19 NOTE — PROGRESS NOTES
Subjective:  On BiPAP on my evaluation  Conversant, resting comfortably  Responding to questions adequately  Off Precedex and Versed at      Objective:    BP (!) 141/66   Pulse 96   Temp 99.7 °F (37.6 °C) (Bladder)   Resp 26   Ht 5' 4\" (1.626 m)   Wt (!) 330 lb 3.2 oz (149.8 kg)   LMP  (LMP Unknown)   SpO2 99%   BMI 56.68 kg/m²     In: 1207.8 [I.V.:982.8; NG/GT:225]  Out: 2700   In: 1207.8   Out: 2700 [Urine:2700]    General appearance BiPAP in place awake, poor hygiene malodorous, morbid obesity  HEENT: AT/NC, MMM  Neck: FROM, supple  Lungs: Clear to auscultation  CV: RRR, no MRGs  Vasc: Radial pulses 2+  Abdomen: Soft, non-tender; no masses or HSM  Extremities: Ichthyosis bilateral lower extremities-bilateral lower extremities in dressing  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  *      Plan:    73-year-old woman with multiple comorbidities admitted to ICU setting for septic shock on triple pressor support, broad-spectrum IV antibiotic therapy, intubated sedated     Extubated 3/17/2022  Out of ICU 3/18/2022  IV fluid resuscitation for marked lactic acidosis-resolved  Off pressor support, start midodrine if needed  on Solu-Cortef 3 times daily-wean now the blood pressure is improved-contributing to marked elevation in blood sugars  -blood cultures with alpha hemolytic strep  Broad-spectrum IV antibiotics with infectious disease following-Merrem has been transitioned to Rocephin 3/9/2021-beta strep group G/ linezolid added-white count resolved  ruq us -hepatomegaly with diffuse fatty infiltration/gallstones and sludge  Glargine, insulin sliding scale for blood sugar management,   Initiation of Precedex, fentanyl and Versed for sedation/agitation status post extubation  Amiodarone being resumed now secondary to marked elevation and heart rate status post cardioversion for hemodynamic instability  Echocardiogram completed-normal LV    Daily labs     DVT Prophylaxis   PT/OT  Discharge planning     Hernán Moya MD  8:00 PM  3/18/2022

## 2022-03-19 NOTE — PATIENT CARE CONFERENCE
Intensive Care Daily Quality Rounding Checklist        ICU Team Members: bedside nurse, charge nurse, Chris Álvarez, residents     ICU Day #: NUMBER: 12     Intubation Date:       Ventilator Day #:      Central Line Insertion Date: Midline                                                    Day #:       Arterial Line Insertion Date:                               Day #:      Temporary Hemodialysis Catheter Insertion Date:                               Day #      DVT Prophylaxis: Lovenox    GI Prophylaxis: Protonix     Hansen Catheter Insertion Date: March 8                                        Day #: 12                             Continued need (if yes, reason documented and discussed with physician): yes,      Skin Issues/ Wounds and ordered treatment discussed on rounds: y     Goals/ Plans for the Day: Daily labs and replace/transfuse as needed. Wean cardizem as able. Wean oxygen as able. Continue critical care management.

## 2022-03-19 NOTE — PROGRESS NOTES
INPATIENT CARDIOLOGY FOLLOW-UP    Name: Divina Linares    Age: 74 y.o. Date of Admission: 3/8/2022  4:28 AM    Date of Service: 3/19/2022    Chief Complaint: Follow-up for atrial fibrillation with RVR    Interim History:  Currently on BiPAP. No chest pain or palpitations. +recurrent AF with RVR as of 3/18/22 (~ 19:50 PM).  PM.    Review of Systems:   Cardiac: As per HPI  General: No fever, chills  Pulmonary: As per HPI  HEENT: No visual disturbances, difficult swallowing  GI: No nausea, vomiting  : No dysuria, hematuria  Endocrine: No thyroid disease, +DM  Musculoskeletal: MEHTA x 4, no focal motor deficits  Skin: LE stasis dermatitis, no rashes  Neuro: No headache, seizures  Psych: Currently with no depression, anxiety    Problem List:  Patient Active Problem List   Diagnosis    Uncontrolled diabetes mellitus ([address]    Depression    Essential hypertension    Hyperlipidemia    Osteoarthritis    PAF (paroxysmal atrial fibrillation) (HCC) - currently in SR    Pulmonary hypertension    Chronic sinusitis    Chronic pain    Steatohepatitis    Baker's cyst of knee    Diabetic neuropathy (HCC)    Iron deficiency    LVH (left ventricular hypertrophy)    Chronic anal fissure    Positive hepatitis C antibody test    PFO (patent foramen ovale)    YEIMI (acute kidney injury) ([address]    Chronic diastolic heart failure (HCC)    Physical deconditioning    Lymphedema of both lower extremities    Chronic anemia    Dyspnea on exertion    Chronic deep vein thrombosis (DVT) of distal vein of right lower extremity (HCC)    GI bleed    Anxiety and depression    Chronic anticoagulation    COPD exacerbation ([address]    Acute on chronic respiratory failure with hypoxia ([address]    Blood loss anemia    Cellulitis of left lower extremity    Poorly controlled diabetes mellitus ([address]    Lymphedema    Septicemia ([address]    Acute diastolic congestive heart failure (HCC)    Anemia    Morbid obesity with BMI of 45.0-49.9, adult ([facility]    Acute hypoxemic respiratory failure (HCC)    Diastolic CHF, acute on chronic (HCC)    Cellulitis    Stress fracture of right fibula    Atrial fibrillation, currently in sinus rhythm    Gastroesophageal reflux disease without esophagitis    Ulcers of both lower legs (HCC)    Chronic respiratory failure with hypoxia (HCC)    Abscess and cellulitis of gluteal region    CHF (congestive heart failure), NYHA class I, acute on chronic, combined ([facility]    Adrenal mass (HCC)    Hyperglycemia    Atrial fibrillation ([facility]    Infection due to extended-spectrum beta-lactamase-producing Escherichia coli    Bilateral cellulitis of lower leg    Ambulatory dysfunction    Chronic respiratory failure with hypercapnia (HCC)    Moderate pulmonary hypertension (HCC)    QT prolongation    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[facility]    Sepsis ([facility]       Allergies:   Allergies   Allergen Reactions    Statins Myalgia       Current Medications:  Current Facility-Administered Medications   Medication Dose Route Frequency Provider Last Rate Last Admin    metoprolol tartrate (LOPRESSOR) tablet 100 mg  100 mg Oral BID Richei Bedoya MD        amiodarone (CORDARONE) 150 mg in dextrose 5 % 100 mL bolus  150 mg IntraVENous Once Richie Bedoya MD        Followed by   [facility] amiodarone (CORDARONE) 450 mg in dextrose 5 % 250 mL infusion  1 mg/min IntraVENous Continuous Richie Bedoya MD        Followed by   [facility] amiodarone (CORDARONE) 450 mg in dextrose 5 % 250 mL infusion  0.5 mg/min IntraVENous Continuous Richie Bedoya MD        perflutren lipid microspheres (DEFINITY) injection 1.65 mg  1.5 mL IntraVENous ONCE PRN Cheikh Pruitt MD        bumetanide [facility]) injection 1 mg  1 mg IntraVENous BID Cheikh Pruitt MD   1 mg at 03/19/22 0823    cephALEXin (KEFLEX) capsule 1,000 mg  1,000 mg Oral 3 times per day Chintan Bridges MD   1,000 mg at 03/18/22 2220    doxycycline hyclate (VIBRAMYCIN) capsule 100 mg  100 mg Oral 2 times per day Katelin Javier MD   100 mg at 03/19/22 0825    dilTIAZem 100 mg in dextrose 5 % 100 mL infusion ([facility])  2.5-15 mg/hr IntraVENous Continuous Bo Aundrea Zaidi MD 15 mL/hr at 03/19/22 0600 15 mg/hr at 03/19/22 0600    naloxegol (MOVANTIK) tablet 25 mg  25 mg Oral QAM Ruba Abarca MD   25 mg at 03/19/22 0824    ipratropium-albuterol (DUONEB) nebulizer solution 1 ampule  1 ampule Inhalation Q6H Edd Ruiz MD   1 ampule at 03/19/22 0842    Arformoterol Tartrate (BROVANA) nebulizer solution 15 mcg  15 mcg Nebulization BID Ruba Abarca MD   15 mcg at 03/19/22 0842    budesonide (PULMICORT) nebulizer suspension 500 mcg  0.5 mg Nebulization BID Ruba Abarca MD   500 mcg at 03/19/22 0842    ipratropium-albuterol (DUONEB) nebulizer solution 1 ampule  1 ampule Inhalation Q4H PRN Ruba Abarca MD   1 ampule at 03/16/22 1011    diazePAM (VALIUM) tablet 5 mg  5 mg Oral Q6H Ruba Abarca MD   5 mg at 03/19/22 0036    oxyCODONE (ROXICODONE) 5 MG/5ML solution 10 mg  10 mg Oral Q6H Ruba Abarca MD   10 mg at 03/19/22 0825    dexmedetomidine (PRECEDEX) 400 mcg in sodium chloride 0.9 % 100 mL infusion  0.1-1.5 mcg/kg/hr IntraVENous Continuous Verta Buff, DO   Stopped at 03/18/22 1424    insulin glargine (LANTUS) injection vial 30 Units  30 Units SubCUTAneous BID Oleta Maryjo Walkeril Wells, DO   30 Units at 03/19/22 0799    sodium chloride flush 0.9 % injection 5-40 mL  5-40 mL IntraVENous 2 times per day Ruba Abarca MD   10 mL at 03/19/22 0825    sodium chloride flush 0.9 % injection 5-40 mL  5-40 mL IntraVENous PRN Ruba Abarca MD   10 mL at 03/15/22 2028    0.9 % sodium chloride infusion  25 mL IntraVENous PRN Ruba Abarca MD        heparin flush 100 UNIT/ML injection 300 Units  3 mL IntraVENous 2 times per day Ruba Abarca MD        heparin flush 100 UNIT/ML injection 300 Units  3 mL IntraCATHeter PRN MD [address]    acetaminophen (TYLENOL) tablet 650 mg  650 mg Oral Q4H PRN Aisha Gonzalez MD   650 mg at 03/17/22 3625    acetaminophen (TYLENOL) suppository 650 mg  650 mg Rectal Q4H PRN Aisha Gonzalez MD        miconazole (MICOTIN) 2 % powder   Topical BID Aisha Gonzalez MD   Given at 03/19/22 0825    0.9 % sodium chloride infusion   IntraVENous Continuous Aisha Gonzalez MD 10 mL/hr at 03/18/22 1642 Rate Verify at 03/18/22 1642      amiodarone      Followed by   Indy Horn amiodarone      dilTIAZem 15 mg/hr (03/19/22 0600)    dexmedetomidine (PRECEDEX) IV infusion Stopped (03/18/22 1424)    sodium chloride      fentaNYL 5 mcg/ml in 0.9%  ml infusion Stopped (03/17/22 1222)    dextrose      midazolam Stopped (03/17/22 1222)    sodium chloride 10 mL/hr at 03/18/22 1642       Physical Exam:  /63   Pulse 164   Temp 99.9 °F (37.7 °C) (Bladder)   Resp 29   Ht 5' 4\" (1.626 m)   Wt (!) 330 lb 3.2 oz (149.8 kg)   LMP  (LMP Unknown)   SpO2 93%   BMI 56.68 kg/m²   Wt Readings from Last 3 Encounters:   03/14/22 (!) 330 lb 3.2 oz (149.8 kg)   08/31/21 (!) 304 lb (137.9 kg)   08/19/21 (!) 305 lb 5.4 oz (138.5 kg)     Appearance: Awake, on BiPAP  Skin: LE stasis dermatitis, no rashes  Head: Normocephalic, atraumatic  Eyes: EOMI, no conjunctival erythema  ENMT: No pharyngeal erythema, MMM, no rhinorrhea  Neck: Supple, no carotid bruits  Lungs: Decreased BS B/L, no wheezing  Cardiac: IRRR, no murmurs apparent  Abdomen: Soft, nontender, +bowel sounds  Extremities: Moves all extremities x 4, +lower extremity edema  Neurologic: No focal motor deficits apparent, awake    Intake/Output:    Intake/Output Summary (Last 24 hours) at 3/19/2022 0906  Last data filed at 3/19/2022 0800  Gross per 24 hour   Intake 703.04 ml   Output 3110 ml   Net -2406.96 ml     I/O this shift:  In: -   Out: 60 [Urine:60]    Laboratory Tests:  Recent Labs     03/17/22  0500 03/18/22  0430 03/19/22  0538    141 144   K 4.5 3.8 3.7   CL 104 100 103   CO2 34* 34* 35*   BUN 23 21 20   CREATININE 1.0 0.7 0.8   GLUCOSE 194* 134* 231*   CALCIUM 7.7* 7.8* 7.9*     Lab Results   Component Value Date    MG 2.2 03/19/2022     Recent Labs     03/17/22  0500 03/18/22  0430 03/19/22  0538   ALKPHOS 112* 102 95   ALT 55* 45* 34*   AST 17 18 23   PROT 6.2* 6.3* 5.8*   BILITOT 0.3 0.3 0.4   LABALBU 2.7* 2.8* 2.6*     Recent Labs     03/17/22  0500 03/18/22  0430 03/19/22  0538   WBC 7.9 9.6 11.2   RBC 3.19* 3.07* 3.15*   HGB 7.9* 7.6* 7.9*   HCT 27.3* 26.7* 27.0*   MCV 85.6 87.0 85.7   MCH 24.8* 24.8* 25.1*   MCHC 28.9* 28.5* 29.3*   RDW 16.3* 16.4* 16.4*    276 369   MPV 10.8 10.4 10.5     Lab Results   Component Value Date    CKTOTAL 85 01/13/2021    CKMB 2.7 01/13/2021    TROPONINI 0.02 04/30/2021    TROPONINI 0.02 04/29/2021    TROPONINI <0.01 04/09/2021     Recent Labs     03/16/22  1730 03/17/22  1020   TROPHS 115* 135*     Lab Results   Component Value Date    INR 1.8 04/29/2021    INR 1.1 01/12/2021    INR 1.1 05/31/2020    PROTIME 19.5 (H) 04/29/2021    PROTIME 12.4 01/12/2021    PROTIME 12.4 05/31/2020     Lab Results   Component Value Date    TSH 1.890 08/18/2021     Lab Results   Component Value Date    LABA1C 7.6 (H) 03/09/2022     No results found for: EAG  Lab Results   Component Value Date    CHOL 355 (H) 01/16/2021    CHOL 287 (H) 05/26/2020    CHOL 318 (H) 11/07/2019     Lab Results   Component Value Date    TRIG 298 (H) 01/16/2021    TRIG 512 (H) 05/26/2020    TRIG 861 (H) 11/07/2019     Lab Results   Component Value Date    HDL 52 01/16/2021    HDL 33 05/26/2020    HDL 28 11/07/2019     Lab Results   Component Value Date    LDLCALC 243 (H) 01/16/2021    LDLCALC - (AA) 05/26/2020    LDLCALC - (AA) 11/07/2019     Lab Results   Component Value Date    LABVLDL 60 01/16/2021    LABVLDL - (AA) 05/26/2020    LABVLDL - (AA) 11/07/2019     No results found for: CHOLHDLRATIO  Recent Labs     03/16/22  1730   PROBNP 5,191*       Cardiac Tests: Telemetry reviewed (date: 3/19/2022): SR --> recurrent AF with RVR as of ~ 19:50 PM on 3/18/22    Echocardiogram: 4/14/21 (Dr. Matilde Barajas)   Normal left ventricle size and systolic function. Ejection fraction is visually estimated at 60-65%. No regional wall motion abnormalities seen. Mild concentric left ventricular hypertrophy. Indeterminate diastolic function. The left atrium is mildly dilated. Moderately dilated right ventricle. Right ventricle global systolic function is normal . TAPSE 32 mm. Physiologic and/or trace mitral regurgitation is present. No hemodynamically significant aortic stenosis is present. Mild tricuspid regurgitation. RVSP is 69 mmHg. Pulmonary hypertension is moderate . No evidence for hemodynamically significant pericardial effusion. Compared to prior echo of 2015, changes noted. Now, there is moderate   pulmonary hypertension with moderately dilated RV. Echocardiogram: 3/18/22 (Dr. Gumaro Cisneros)   Left ventricle is normal in size . Moderate left ventricular concentric hypertrophy noted. No regional wall motion abnormalities seen. Ejection fraction is visually estimated at 65%. Normal right ventricular size and function. The left atrium is mildly dilated. Interatrial septum appears intact. Structurally normal mitral valve. Moderate mitral annular calcification. The aortic valve appears mildly sclerotic   Mild tricuspid regurgitation. RVSP is 47 mmHg. Pulmonary hypertension is mild . ASSESSMENT / PLAN:  1. Atrial fibrillation with RVR (known PAF prior to admission) -- in and out of AF this admission, recurrent AF with RVR as of ~ 19:50 PM on 3/18/22  2. Elevated hs-troponin (115 --> 135)  3. Acute on chronic hypoxic respiratory failure -- currently on BiPAP  4. Acute on chronic HFpEF  5. Cellulitis right lower extremity / sepsis  6. HTN  7. HLD  8. DM - Hgb A1c 10.2 --> 7.6  9. Chronic anemia -- most recent Hgb 7.9  10.  Probable SAM  11. PHTN  12. BMI 56.7  13. PFO  14. History of TIA    - Will increase metoprolol to 100 mg BID (via NG tube) -- switch back to Toprol XL once able  - Will restart IV amiodarone  - Continue heparin / resume OAC once able  - Monitor BMP and I/O's with ongoing IV diuresis  - Will check TSH  - Aggressive risk factor modifications / treatment of SAM as indicated  - Monitor CBC  - Monitor telemetry  - Care per ID and ICU team    Greater than 35 minutes was spent counseling the patient, reviewing the rationale for the above recommendations and reviewing the patient's current medication list, problem list and results of all previously ordered testing.     Sabrina Tirado MD  [facility]) Cardiology

## 2022-03-19 NOTE — PROGRESS NOTES
Pulmonary Subsequent Hospital F/U note    Patient is being followed for: acute on chronic hypoxic and hypercapnic respiratory failure     Interval HPI:  Currently lying in bed on avaps  She is awake and trying to communicate  On cardizem for afib with RVR  Off precedex    ROS:  Difficult to obtain     Exam:  BP (!) 144/71   Pulse 138   Temp 99.9 °F (37.7 °C) (Bladder)   Resp 29   Ht 5' 4\" (1.626 m)   Wt (!) 330 lb 3.2 oz (149.8 kg)   LMP  (LMP Unknown)   SpO2 98%   BMI 56.68 kg/m²    General: Patient is calm and following commands, on NIV  HEENT: PERRL, EOMI, NIV mask in place   Neck: supple no adenopathy  CV: RRR without murmur  Lungs: decreased BS diffusely  Abd: soft, ND, NT, bowel sounds normal  Ext: warm, extensive chronic lymphedema of the legs     Data:    Oximetry:  SpO2 Readings from Last 1 Encounters:   03/19/22 98%       Imaging personally reviewed by myself:  CXR 3/19/22     Impression   Heart is enlarged with slight prominence of the pulmonary vascularity.  Deep   line catheter on the left has been removed.  Nasogastric tube remains, tip in   the stomach.               Pertinent labs reviewed and noted:  Lab Results   Component Value Date    WBC 11.2 03/19/2022    HGB 7.9 03/19/2022    HCT 27.0 03/19/2022    MCV 85.7 03/19/2022    MCH 25.1 03/19/2022    MCHC 29.3 03/19/2022    RDW 16.4 03/19/2022     03/19/2022    MPV 10.5 03/19/2022     Lab Results   Component Value Date     03/19/2022    K 3.7 03/19/2022    K 3.6 03/16/2022     03/19/2022    CO2 35 03/19/2022    BUN 20 03/19/2022    CREATININE 0.8 03/19/2022    LABALBU 2.6 03/19/2022    LABALBU 4.5 11/02/2011    CALCIUM 7.9 03/19/2022    GFRAA >60 03/19/2022    LABGLOM >60 03/19/2022     Lab Results   Component Value Date    PROTIME 19.5 04/29/2021    INR 1.8 04/29/2021       Assessment:  1. Acute on chronic hypoxic and hypercapnic respiratory failure  2. Atelectasis  3. Group G strep bacteremia  4. Fevers  5.  Cellulitis R lower extremity  6. Morbid obesity BMI 56  7. Lymphedema  8. Septic shock, resolved  9. Atrial fibrillation with RVR  10. HFpEF  11. Pulmonary hypertension  12. COPD    Plan:  1. AVAPS - cycle on and off and wear nocturnally   2. Off precedex  3. On cardizem - normal sinus and rates of 90 when I was in the room   4. Abx per ID  5. Continue diuresis   5.  Would stop solucortef    Electronically signed by Ely Head MD on 3/19/2022 at 11:37 AM

## 2022-03-19 NOTE — PROGRESS NOTES
1410: patient half pulled out midline, infiltrated cardizem gtt, midline removed. Spoke with pharamcist in regards to if med is a vesicant, pharmacist says there is no information available and to use standard infiltration protocol (ice and elevation). Ice applied and patient's arm elevated. Will continue to monitor and observe.     Rosie Wallace RN BSN

## 2022-03-20 ENCOUNTER — APPOINTMENT (OUTPATIENT)
Dept: GENERAL RADIOLOGY | Age: 67
DRG: 870 | End: 2022-03-20
Payer: MEDICARE

## 2022-03-20 ENCOUNTER — APPOINTMENT (OUTPATIENT)
Dept: ULTRASOUND IMAGING | Age: 67
DRG: 870 | End: 2022-03-20
Payer: MEDICARE

## 2022-03-20 LAB
ABO/RH: NORMAL
ADENOVIRUS BY PCR: NOT DETECTED
ALBUMIN SERPL-MCNC: 2.5 G/DL (ref 3.5–5.2)
ALP BLD-CCNC: 84 U/L (ref 35–104)
ALT SERPL-CCNC: 31 U/L (ref 0–32)
ANION GAP SERPL CALCULATED.3IONS-SCNC: 6 MMOL/L (ref 7–16)
ANTIBODY SCREEN: NORMAL
AST SERPL-CCNC: 28 U/L (ref 0–31)
BASOPHILS ABSOLUTE: 0.04 E9/L (ref 0–0.2)
BASOPHILS RELATIVE PERCENT: 0.4 % (ref 0–2)
BILIRUB SERPL-MCNC: 0.4 MG/DL (ref 0–1.2)
BLOOD BANK DISPENSE STATUS: NORMAL
BLOOD BANK PRODUCT CODE: NORMAL
BLOOD CULTURE, ROUTINE: NORMAL
BORDETELLA PARAPERTUSSIS BY PCR: NOT DETECTED
BORDETELLA PERTUSSIS BY PCR: NOT DETECTED
BPU ID: NORMAL
BUN BLDV-MCNC: 16 MG/DL (ref 6–23)
CALCIUM SERPL-MCNC: 8.1 MG/DL (ref 8.6–10.2)
CHLAMYDOPHILIA PNEUMONIAE BY PCR: NOT DETECTED
CHLORIDE BLD-SCNC: 100 MMOL/L (ref 98–107)
CO2: 37 MMOL/L (ref 22–29)
CORONAVIRUS 229E BY PCR: NOT DETECTED
CORONAVIRUS HKU1 BY PCR: NOT DETECTED
CORONAVIRUS NL63 BY PCR: NOT DETECTED
CORONAVIRUS OC43 BY PCR: NOT DETECTED
CREAT SERPL-MCNC: 0.7 MG/DL (ref 0.5–1)
CULTURE, BLOOD 2: NORMAL
DESCRIPTION BLOOD BANK: NORMAL
EOSINOPHILS ABSOLUTE: 0.2 E9/L (ref 0.05–0.5)
EOSINOPHILS RELATIVE PERCENT: 2.2 % (ref 0–6)
GFR AFRICAN AMERICAN: >60
GFR NON-AFRICAN AMERICAN: >60 ML/MIN/1.73
GLUCOSE BLD-MCNC: 187 MG/DL (ref 74–99)
HCT VFR BLD CALC: 25 % (ref 34–48)
HEMOGLOBIN: 7.2 G/DL (ref 11.5–15.5)
HUMAN METAPNEUMOVIRUS BY PCR: NOT DETECTED
HUMAN RHINOVIRUS/ENTEROVIRUS BY PCR: NOT DETECTED
HYPOCHROMIA: ABNORMAL
IMMATURE GRANULOCYTES #: 0.05 E9/L
IMMATURE GRANULOCYTES %: 0.6 % (ref 0–5)
INFLUENZA A BY PCR: NOT DETECTED
INFLUENZA B BY PCR: NOT DETECTED
LACTIC ACID: 1 MMOL/L (ref 0.5–2.2)
LYMPHOCYTES ABSOLUTE: 1.65 E9/L (ref 1.5–4)
LYMPHOCYTES RELATIVE PERCENT: 18.4 % (ref 20–42)
MAGNESIUM: 1.8 MG/DL (ref 1.6–2.6)
MCH RBC QN AUTO: 24.9 PG (ref 26–35)
MCHC RBC AUTO-ENTMCNC: 28.8 % (ref 32–34.5)
MCV RBC AUTO: 86.5 FL (ref 80–99.9)
METER GLUCOSE: 163 MG/DL (ref 74–99)
METER GLUCOSE: 186 MG/DL (ref 74–99)
METER GLUCOSE: 227 MG/DL (ref 74–99)
METER GLUCOSE: 232 MG/DL (ref 74–99)
METER GLUCOSE: 243 MG/DL (ref 74–99)
METER GLUCOSE: 309 MG/DL (ref 74–99)
MONOCYTES ABSOLUTE: 0.71 E9/L (ref 0.1–0.95)
MONOCYTES RELATIVE PERCENT: 7.9 % (ref 2–12)
MYCOPLASMA PNEUMONIAE BY PCR: NOT DETECTED
NEUTROPHILS ABSOLUTE: 6.32 E9/L (ref 1.8–7.3)
NEUTROPHILS RELATIVE PERCENT: 70.5 % (ref 43–80)
PARAINFLUENZA VIRUS 1 BY PCR: NOT DETECTED
PARAINFLUENZA VIRUS 2 BY PCR: NOT DETECTED
PARAINFLUENZA VIRUS 3 BY PCR: NOT DETECTED
PARAINFLUENZA VIRUS 4 BY PCR: NOT DETECTED
PDW BLD-RTO: 16.7 FL (ref 11.5–15)
PHOSPHORUS: 2.5 MG/DL (ref 2.5–4.5)
PLATELET # BLD: 330 E9/L (ref 130–450)
PMV BLD AUTO: 10 FL (ref 7–12)
POLYCHROMASIA: ABNORMAL
POTASSIUM SERPL-SCNC: 3.6 MMOL/L (ref 3.5–5)
RBC # BLD: 2.89 E12/L (ref 3.5–5.5)
RESPIRATORY SYNCYTIAL VIRUS BY PCR: NOT DETECTED
SARS-COV-2, PCR: NOT DETECTED
SODIUM BLD-SCNC: 143 MMOL/L (ref 132–146)
TOTAL PROTEIN: 5.7 G/DL (ref 6.4–8.3)
WBC # BLD: 9 E9/L (ref 4.5–11.5)

## 2022-03-20 PROCEDURE — 85025 COMPLETE CBC W/AUTO DIFF WBC: CPT

## 2022-03-20 PROCEDURE — 6360000002 HC RX W HCPCS: Performed by: INTERNAL MEDICINE

## 2022-03-20 PROCEDURE — 99233 SBSQ HOSP IP/OBS HIGH 50: CPT | Performed by: INTERNAL MEDICINE

## 2022-03-20 PROCEDURE — 2580000003 HC RX 258: Performed by: INTERNAL MEDICINE

## 2022-03-20 PROCEDURE — 02HV33Z INSERTION OF INFUSION DEVICE INTO SUPERIOR VENA CAVA, PERCUTANEOUS APPROACH: ICD-10-PCS | Performed by: INTERNAL MEDICINE

## 2022-03-20 PROCEDURE — 76937 US GUIDE VASCULAR ACCESS: CPT

## 2022-03-20 PROCEDURE — 6370000000 HC RX 637 (ALT 250 FOR IP): Performed by: SPECIALIST

## 2022-03-20 PROCEDURE — 6370000000 HC RX 637 (ALT 250 FOR IP): Performed by: INTERNAL MEDICINE

## 2022-03-20 PROCEDURE — 36415 COLL VENOUS BLD VENIPUNCTURE: CPT

## 2022-03-20 PROCEDURE — 71045 X-RAY EXAM CHEST 1 VIEW: CPT

## 2022-03-20 PROCEDURE — C1751 CATH, INF, PER/CENT/MIDLINE: HCPCS

## 2022-03-20 PROCEDURE — P9016 RBC LEUKOCYTES REDUCED: HCPCS

## 2022-03-20 PROCEDURE — 84100 ASSAY OF PHOSPHORUS: CPT

## 2022-03-20 PROCEDURE — 93971 EXTREMITY STUDY: CPT

## 2022-03-20 PROCEDURE — 0202U NFCT DS 22 TRGT SARS-COV-2: CPT

## 2022-03-20 PROCEDURE — 80053 COMPREHEN METABOLIC PANEL: CPT

## 2022-03-20 PROCEDURE — 3E0G76Z INTRODUCTION OF NUTRITIONAL SUBSTANCE INTO UPPER GI, VIA NATURAL OR ARTIFICIAL OPENING: ICD-10-PCS | Performed by: INTERNAL MEDICINE

## 2022-03-20 PROCEDURE — 86923 COMPATIBILITY TEST ELECTRIC: CPT

## 2022-03-20 PROCEDURE — 94660 CPAP INITIATION&MGMT: CPT

## 2022-03-20 PROCEDURE — 83605 ASSAY OF LACTIC ACID: CPT

## 2022-03-20 PROCEDURE — 2500000003 HC RX 250 WO HCPCS: Performed by: INTERNAL MEDICINE

## 2022-03-20 PROCEDURE — 83735 ASSAY OF MAGNESIUM: CPT

## 2022-03-20 PROCEDURE — 94640 AIRWAY INHALATION TREATMENT: CPT

## 2022-03-20 PROCEDURE — 6370000000 HC RX 637 (ALT 250 FOR IP): Performed by: STUDENT IN AN ORGANIZED HEALTH CARE EDUCATION/TRAINING PROGRAM

## 2022-03-20 PROCEDURE — 36569 INSJ PICC 5 YR+ W/O IMAGING: CPT

## 2022-03-20 PROCEDURE — 86901 BLOOD TYPING SEROLOGIC RH(D): CPT

## 2022-03-20 PROCEDURE — 86900 BLOOD TYPING SEROLOGIC ABO: CPT

## 2022-03-20 PROCEDURE — 36430 TRANSFUSION BLD/BLD COMPNT: CPT

## 2022-03-20 PROCEDURE — C9113 INJ PANTOPRAZOLE SODIUM, VIA: HCPCS | Performed by: INTERNAL MEDICINE

## 2022-03-20 PROCEDURE — 82962 GLUCOSE BLOOD TEST: CPT

## 2022-03-20 PROCEDURE — 2000000000 HC ICU R&B

## 2022-03-20 PROCEDURE — 85018 HEMOGLOBIN: CPT

## 2022-03-20 PROCEDURE — 85014 HEMATOCRIT: CPT

## 2022-03-20 PROCEDURE — 86850 RBC ANTIBODY SCREEN: CPT

## 2022-03-20 RX ORDER — METOPROLOL SUCCINATE 100 MG/1
100 TABLET, EXTENDED RELEASE ORAL 2 TIMES DAILY
Status: DISCONTINUED | OUTPATIENT
Start: 2022-03-20 | End: 2022-03-22 | Stop reason: HOSPADM

## 2022-03-20 RX ORDER — SODIUM CHLORIDE 9 MG/ML
INJECTION, SOLUTION INTRAVENOUS PRN
Status: DISCONTINUED | OUTPATIENT
Start: 2022-03-20 | End: 2022-03-21

## 2022-03-20 RX ORDER — HYDROXYZINE HYDROCHLORIDE 10 MG/1
50 TABLET, FILM COATED ORAL 3 TIMES DAILY
Status: DISCONTINUED | OUTPATIENT
Start: 2022-03-20 | End: 2022-03-22 | Stop reason: HOSPADM

## 2022-03-20 RX ORDER — DULOXETIN HYDROCHLORIDE 60 MG/1
60 CAPSULE, DELAYED RELEASE ORAL NIGHTLY
Status: DISCONTINUED | OUTPATIENT
Start: 2022-03-20 | End: 2022-03-22 | Stop reason: HOSPADM

## 2022-03-20 RX ORDER — METOPROLOL TARTRATE 5 MG/5ML
5 INJECTION INTRAVENOUS EVERY 6 HOURS PRN
Status: DISCONTINUED | OUTPATIENT
Start: 2022-03-20 | End: 2022-03-22 | Stop reason: HOSPADM

## 2022-03-20 RX ORDER — OXYCODONE HCL 5 MG/5 ML
10 SOLUTION, ORAL ORAL EVERY 6 HOURS PRN
Status: DISCONTINUED | OUTPATIENT
Start: 2022-03-20 | End: 2022-03-22 | Stop reason: HOSPADM

## 2022-03-20 RX ORDER — MAGNESIUM SULFATE IN WATER 40 MG/ML
2000 INJECTION, SOLUTION INTRAVENOUS ONCE
Status: COMPLETED | OUTPATIENT
Start: 2022-03-20 | End: 2022-03-20

## 2022-03-20 RX ORDER — DULOXETIN HYDROCHLORIDE 30 MG/1
30 CAPSULE, DELAYED RELEASE ORAL EVERY MORNING
Status: DISCONTINUED | OUTPATIENT
Start: 2022-03-20 | End: 2022-03-22 | Stop reason: HOSPADM

## 2022-03-20 RX ORDER — METOPROLOL TARTRATE 5 MG/5ML
5 INJECTION INTRAVENOUS EVERY 6 HOURS
Status: DISCONTINUED | OUTPATIENT
Start: 2022-03-20 | End: 2022-03-20

## 2022-03-20 RX ORDER — DIAZEPAM 5 MG/1
5 TABLET ORAL EVERY 6 HOURS PRN
Status: DISCONTINUED | OUTPATIENT
Start: 2022-03-20 | End: 2022-03-22 | Stop reason: HOSPADM

## 2022-03-20 RX ORDER — BUMETANIDE 0.25 MG/ML
1 INJECTION, SOLUTION INTRAMUSCULAR; INTRAVENOUS DAILY
Status: DISCONTINUED | OUTPATIENT
Start: 2022-03-20 | End: 2022-03-22 | Stop reason: HOSPADM

## 2022-03-20 RX ADMIN — ARFORMOTEROL TARTRATE 15 MCG: 15 SOLUTION RESPIRATORY (INHALATION) at 20:06

## 2022-03-20 RX ADMIN — BUMETANIDE 1 MG: 0.25 INJECTION INTRAMUSCULAR; INTRAVENOUS at 11:46

## 2022-03-20 RX ADMIN — BUDESONIDE 500 MCG: 0.5 SUSPENSION RESPIRATORY (INHALATION) at 08:30

## 2022-03-20 RX ADMIN — SODIUM CHLORIDE, PRESERVATIVE FREE 10 ML: 5 INJECTION INTRAVENOUS at 20:45

## 2022-03-20 RX ADMIN — CEPHALEXIN 1000 MG: 500 CAPSULE ORAL at 15:06

## 2022-03-20 RX ADMIN — BUDESONIDE 500 MCG: 0.5 SUSPENSION RESPIRATORY (INHALATION) at 20:06

## 2022-03-20 RX ADMIN — INSULIN LISPRO 3 UNITS: 100 INJECTION, SOLUTION INTRAVENOUS; SUBCUTANEOUS at 03:58

## 2022-03-20 RX ADMIN — INSULIN LISPRO 6 UNITS: 100 INJECTION, SOLUTION INTRAVENOUS; SUBCUTANEOUS at 00:40

## 2022-03-20 RX ADMIN — QUETIAPINE FUMARATE 50 MG: 25 TABLET ORAL at 07:56

## 2022-03-20 RX ADMIN — CHLORHEXIDINE GLUCONATE 0.12% ORAL RINSE 15 ML: 1.2 LIQUID ORAL at 07:58

## 2022-03-20 RX ADMIN — SENNOSIDES 17.6 MG: 8.8 SYRUP ORAL at 20:47

## 2022-03-20 RX ADMIN — SENNOSIDES 17.6 MG: 8.8 SYRUP ORAL at 11:57

## 2022-03-20 RX ADMIN — PANTOPRAZOLE SODIUM 40 MG: 40 INJECTION, POWDER, FOR SOLUTION INTRAVENOUS at 07:58

## 2022-03-20 RX ADMIN — POTASSIUM BICARBONATE 40 MEQ: 782 TABLET, EFFERVESCENT ORAL at 15:21

## 2022-03-20 RX ADMIN — METOPROLOL SUCCINATE 100 MG: 100 TABLET, EXTENDED RELEASE ORAL at 07:55

## 2022-03-20 RX ADMIN — HYDROXYZINE HYDROCHLORIDE 50 MG: 10 TABLET ORAL at 20:44

## 2022-03-20 RX ADMIN — DOCUSATE SODIUM LIQUID 100 MG: 100 LIQUID ORAL at 07:57

## 2022-03-20 RX ADMIN — INSULIN LISPRO 3 UNITS: 100 INJECTION, SOLUTION INTRAVENOUS; SUBCUTANEOUS at 08:04

## 2022-03-20 RX ADMIN — IPRATROPIUM BROMIDE AND ALBUTEROL SULFATE 1 AMPULE: 2.5; .5 SOLUTION RESPIRATORY (INHALATION) at 20:06

## 2022-03-20 RX ADMIN — HYDROXYZINE HYDROCHLORIDE 50 MG: 10 TABLET ORAL at 15:22

## 2022-03-20 RX ADMIN — DULOXETINE HYDROCHLORIDE 60 MG: 60 CAPSULE, DELAYED RELEASE ORAL at 20:47

## 2022-03-20 RX ADMIN — OXYCODONE HYDROCHLORIDE 10 MG: 5 SOLUTION ORAL at 18:21

## 2022-03-20 RX ADMIN — INSULIN LISPRO 6 UNITS: 100 INJECTION, SOLUTION INTRAVENOUS; SUBCUTANEOUS at 12:11

## 2022-03-20 RX ADMIN — MAGNESIUM SULFATE HEPTAHYDRATE 2000 MG: 40 INJECTION, SOLUTION INTRAVENOUS at 11:54

## 2022-03-20 RX ADMIN — DEXTROSE MONOHYDRATE 150 MG: 50 INJECTION, SOLUTION INTRAVENOUS at 10:21

## 2022-03-20 RX ADMIN — SODIUM CHLORIDE, PRESERVATIVE FREE 10 ML: 5 INJECTION INTRAVENOUS at 07:58

## 2022-03-20 RX ADMIN — DOXYCYCLINE HYCLATE 100 MG: 100 CAPSULE ORAL at 07:58

## 2022-03-20 RX ADMIN — QUETIAPINE FUMARATE 50 MG: 25 TABLET ORAL at 20:45

## 2022-03-20 RX ADMIN — DIAZEPAM 5 MG: 5 TABLET ORAL at 06:58

## 2022-03-20 RX ADMIN — POTASSIUM BICARBONATE 40 MEQ: 782 TABLET, EFFERVESCENT ORAL at 11:45

## 2022-03-20 RX ADMIN — DEXTROSE 1 MG/MIN: 5 SOLUTION INTRAVENOUS at 19:45

## 2022-03-20 RX ADMIN — CEPHALEXIN 1000 MG: 500 CAPSULE ORAL at 06:58

## 2022-03-20 RX ADMIN — DIAZEPAM 5 MG: 5 TABLET ORAL at 17:12

## 2022-03-20 RX ADMIN — MICONAZOLE NITRATE: 20 POWDER TOPICAL at 20:47

## 2022-03-20 RX ADMIN — OXYCODONE HYDROCHLORIDE 10 MG: 5 SOLUTION ORAL at 07:58

## 2022-03-20 RX ADMIN — DOCUSATE SODIUM LIQUID 100 MG: 100 LIQUID ORAL at 20:45

## 2022-03-20 RX ADMIN — INSULIN LISPRO 12 UNITS: 100 INJECTION, SOLUTION INTRAVENOUS; SUBCUTANEOUS at 23:53

## 2022-03-20 RX ADMIN — IPRATROPIUM BROMIDE AND ALBUTEROL SULFATE 1 AMPULE: 2.5; .5 SOLUTION RESPIRATORY (INHALATION) at 08:30

## 2022-03-20 RX ADMIN — NALOXEGOL OXALATE 25 MG: 12.5 TABLET, FILM COATED ORAL at 07:56

## 2022-03-20 RX ADMIN — DOXYCYCLINE HYCLATE 100 MG: 100 CAPSULE ORAL at 20:45

## 2022-03-20 RX ADMIN — INSULIN LISPRO 12 UNITS: 100 INJECTION, SOLUTION INTRAVENOUS; SUBCUTANEOUS at 19:47

## 2022-03-20 RX ADMIN — INSULIN LISPRO 6 UNITS: 100 INJECTION, SOLUTION INTRAVENOUS; SUBCUTANEOUS at 15:46

## 2022-03-20 RX ADMIN — METOPROLOL SUCCINATE 100 MG: 100 TABLET, EXTENDED RELEASE ORAL at 20:47

## 2022-03-20 RX ADMIN — CEPHALEXIN 1000 MG: 500 CAPSULE ORAL at 22:00

## 2022-03-20 RX ADMIN — ARFORMOTEROL TARTRATE 15 MCG: 15 SOLUTION RESPIRATORY (INHALATION) at 08:30

## 2022-03-20 RX ADMIN — MICONAZOLE NITRATE: 20 POWDER TOPICAL at 08:03

## 2022-03-20 RX ADMIN — INSULIN GLARGINE 30 UNITS: 100 INJECTION, SOLUTION SUBCUTANEOUS at 22:00

## 2022-03-20 RX ADMIN — INSULIN GLARGINE 30 UNITS: 100 INJECTION, SOLUTION SUBCUTANEOUS at 08:04

## 2022-03-20 RX ADMIN — ACETAMINOPHEN 650 MG: 325 TABLET ORAL at 09:00

## 2022-03-20 RX ADMIN — DULOXETINE HYDROCHLORIDE 30 MG: 30 CAPSULE, DELAYED RELEASE ORAL at 12:08

## 2022-03-20 RX ADMIN — ENOXAPARIN SODIUM 135 MG: 150 INJECTION SUBCUTANEOUS at 07:56

## 2022-03-20 RX ADMIN — DEXTROSE 1 MG/MIN: 5 SOLUTION INTRAVENOUS at 10:35

## 2022-03-20 ASSESSMENT — PAIN DESCRIPTION - LOCATION: LOCATION: GENERALIZED

## 2022-03-20 ASSESSMENT — PAIN SCALES - GENERAL
PAINLEVEL_OUTOF10: 7
PAINLEVEL_OUTOF10: 4
PAINLEVEL_OUTOF10: 0
PAINLEVEL_OUTOF10: 4
PAINLEVEL_OUTOF10: 0
PAINLEVEL_OUTOF10: 4
PAINLEVEL_OUTOF10: 0

## 2022-03-20 ASSESSMENT — PAIN DESCRIPTION - PAIN TYPE: TYPE: ACUTE PAIN;CHRONIC PAIN

## 2022-03-20 ASSESSMENT — PAIN DESCRIPTION - DESCRIPTORS: DESCRIPTORS: PATIENT UNABLE TO DESCRIBE

## 2022-03-20 NOTE — PLAN OF CARE
Problem: Non-Violent Restraints  Goal: No harm/injury to patient while restraints in use  Outcome: Met This Shift  Goal: Patient's dignity will be maintained  Outcome: Met This Shift     Problem: Skin Integrity - Impaired:  Goal: Will show no infection signs and symptoms  Description: Will show no infection signs and symptoms  Outcome: Met This Shift  Goal: Absence of new skin breakdown  Description: Absence of new skin breakdown  Outcome: Met This Shift     Problem: Tissue Perfusion - Cardiopulmonary, Altered:  Goal: Absence of angina  Description: Absence of angina  Outcome: Met This Shift  Goal: Hemodynamic stability will improve  Description: Hemodynamic stability will improve  Outcome: Met This Shift     Problem: Skin Integrity:  Goal: Will show no infection signs and symptoms  Description: Will show no infection signs and symptoms  Outcome: Met This Shift  Goal: Absence of new skin breakdown  Description: Absence of new skin breakdown  Outcome: Met This Shift     Problem: Falls - Risk of:  Goal: Will remain free from falls  Description: Will remain free from falls  Outcome: Met This Shift  Goal: Absence of physical injury  Description: Absence of physical injury  Outcome: Met This Shift     Problem: Airway Clearance - Ineffective:  Goal: Ability to maintain a clear airway will improve  Description: Ability to maintain a clear airway will improve  Outcome: Ongoing     Problem: Anxiety/Stress:  Goal: Level of anxiety will decrease  Description: Level of anxiety will decrease  Outcome: Ongoing     Problem: Aspiration:  Goal: Absence of aspiration  Description: Absence of aspiration  Outcome: Ongoing     Problem:  Bowel Function - Altered:  Goal: Bowel elimination is within specified parameters  Description: Bowel elimination is within specified parameters  Outcome: Ongoing     Problem: Cardiac Output - Decreased:  Goal: Hemodynamic stability will improve  Description: Hemodynamic stability will improve  Outcome: Ongoing     Problem: Fluid Volume - Imbalance:  Goal: Absence of imbalanced fluid volume signs and symptoms  Description: Absence of imbalanced fluid volume signs and symptoms  Outcome: Ongoing     Problem: Gas Exchange - Impaired:  Goal: Levels of oxygenation will improve  Description: Levels of oxygenation will improve  Outcome: Ongoing     Problem: Mental Status - Impaired:  Goal: Mental status will be restored to baseline  Description: Mental status will be restored to baseline  Outcome: Ongoing     Problem: Nutrition Deficit:  Goal: Ability to achieve adequate nutritional intake will improve  Description: Ability to achieve adequate nutritional intake will improve  Outcome: Ongoing     Problem: Pain:  Goal: Pain level will decrease  Description: Pain level will decrease  Outcome: Ongoing  Goal: Recognizes and communicates pain  Description: Recognizes and communicates pain  Outcome: Ongoing  Goal: Control of acute pain  Description: Control of acute pain  Outcome: Ongoing  Goal: Control of chronic pain  Description: Control of chronic pain  Outcome: Ongoing     Problem: Serum Glucose Level - Abnormal:  Goal: Ability to maintain appropriate glucose levels will improve to within specified parameters  Description: Ability to maintain appropriate glucose levels will improve to within specified parameters  Outcome: Ongoing     Problem: Sleep Pattern Disturbance:  Goal: Appears well-rested  Description: Appears well-rested  Outcome: Ongoing     Problem: Tissue Perfusion, Altered:  Goal: Circulatory function within specified parameters  Description: Circulatory function within specified parameters  Outcome: Ongoing     Problem: Pain:  Goal: Pain level will decrease  Description: Pain level will decrease  Outcome: Ongoing  Goal: Control of acute pain  Description: Control of acute pain  Outcome: Ongoing  Goal: Control of chronic pain  Description: Control of chronic pain  Outcome: Ongoing     Problem: Non-Violent

## 2022-03-20 NOTE — PROGRESS NOTES
Pulmonary Subsequent Hospital F/U note    Patient is being followed for: acute on chronic hypoxic and hypercapnic respiratory failure     Interval HPI:  Currently lying in bed on 3.5L NC  She is feeling okay, somewhat confused  Remains in afib with RVR  Breathing does not appear labored  Back on amiodarone infusion    ROS:  Difficult to obtain     Exam:  BP (!) 101/52   Pulse 141   Temp 99 °F (37.2 °C) (Bladder)   Resp (!) 39   Ht 5' 4\" (1.626 m)   Wt (!) 330 lb 3.2 oz (149.8 kg)   LMP  (LMP Unknown)   SpO2 99%   BMI 56.68 kg/m²    General: Patient is calm and following commands, on nasal canula  HEENT: PERRL, EOMI, MMM  Neck: supple no adenopathy  CV: irregularly irregular   Lungs: decreased BS diffusely  Abd: soft, ND, NT, bowel sounds normal  Ext: warm, extensive chronic lymphedema of the legs     Data:    Oximetry:  SpO2 Readings from Last 1 Encounters:   03/20/22 99%       Imaging personally reviewed by myself:  CXR 3/20/22     Impression   1. The tip of the PICC line projects into the superior aspect of the right   atrium.  The PICC line can remain in this position or if clinically indicated   can be pulled back by an additional 2 cm. Pertinent labs reviewed and noted:  Lab Results   Component Value Date    WBC 9.0 03/20/2022    HGB 7.2 03/20/2022    HCT 25.0 03/20/2022    MCV 86.5 03/20/2022    MCH 24.9 03/20/2022    MCHC 28.8 03/20/2022    RDW 16.7 03/20/2022     03/20/2022    MPV 10.0 03/20/2022     Lab Results   Component Value Date     03/20/2022    K 3.6 03/20/2022    K 3.6 03/16/2022     03/20/2022    CO2 37 03/20/2022    BUN 16 03/20/2022    CREATININE 0.7 03/20/2022    LABALBU 2.5 03/20/2022    LABALBU 4.5 11/02/2011    CALCIUM 8.1 03/20/2022    GFRAA >60 03/20/2022    LABGLOM >60 03/20/2022     Lab Results   Component Value Date    PROTIME 19.5 04/29/2021    INR 1.8 04/29/2021       Assessment:  1. Acute on chronic hypoxic and hypercapnic respiratory failure  2. Atelectasis  3. Group G strep bacteremia  4. Fevers  5. Cellulitis R lower extremity  6. Morbid obesity BMI 56  7. Lymphedema  8. Septic shock, resolved  9. Atrial fibrillation with RVR  10. HFpEF  11. Pulmonary hypertension  12. COPD    Plan:  1. Okay to use AVAPS nocturnally and prn  2. Remains on an amiodarone infusion  3. Abx per ID  4. Continue diuresis   5.  PT/OT/speech    Electronically signed by Myesha Riley MD on 3/20/2022 at 4:36 PM

## 2022-03-20 NOTE — PROCEDURES
Arterial Line Placement Procedure Note                     Indication: Need for serial blood work, metabolic derangement, arterial blood gases, mechanical ventilation and severe hypotension    Consent: Unable to be obtained due to the emergent nature of this procedure. Ramon's Test: Normal    Procedure: The skin over the right radial artery was prepped with chlorhexidine and draped in a sterile fashion. Local anesthesia was not performed due to the emergent nature of this procedure. An arterial line catheter was then inserted, using a modified Seldinger technique, into the vessel. The transducer set was then attached and securely fastened to the skin with sutures. Waveforms on the monitor were observed and found to be adequate. The patient had good distal perfusion after the procedure. The site was then dressed in a sterile fashion. The patient tolerated the procedure well. Complications: None    Dr. Ana Riley was present for all critical portions of procedure.     Evan Sanchez, DO Tony Fabian, DO
MIDLINE  Catheter insertion date 3/18/2022     Product Number:  [id number]   Lot No: [id number]   Gauge: 17   Lumen: 4.5fr single   L Cephalic    Vein Diameter : 0.41cm   Mid-Upper arm circumference 47cm   Catheter Length : 15CM                    Internal Length: 15cm   Exposed Catheter Length: 0cm   Ultrasound Used:yes  :  RIZWAN Shoemaker RN
PICC  Catheter insertion date: 3/20/2022     Product Number:  [id number]   Lot No: [id number]   Gauge: 18   Lumen: 7.4TD double   R Basilic    Vein Diameter: 0.52cm   Mid upper arm circumference: 37cm   Catheter Length: 49cm   Internal Length: 49cm   External Catheter Length: 0cm   Ultrasound Used: yes  CXR shows R atrium placement, line pulled 4cm and re xrayed. Line pulled additional 2 cm, to 6 cm external, per radiologist recommendation. Floor nurse informed ok to use.   : RIZWAN Shoemaker RN
PROCEDURE  3/16/22       Time: 1536    CARDIOVERSION  Risks, benefits and alternatives (for applicable procedures below) described. Performed By: Elliott Gregory DO. Indication: atrial fibrillation and unstable tachycardia. Informed consent obtained: Unable to be obtained due to the emergent nature of this procedure. .  Procedure:  Vagal maneuvers were not attempted. Prior to the Procedure, anesthsia given: yes; ICU sedation . Cardioversion was performed under my order and direction, and was attempted by synchronous mode, 1 times with 200 Joules. The resultant rhythm was:  no change in rhythm. Complications: None. Patient tolerated the procedure well. Cardiology Consult Placed:  yes. Dr. Cat Lopez was present for all critical portions of procedure    Elliott Gregory DO    Attending Physician Attestation: Dr. Cecilia Ott    Thank you very much for allowing me to see this patient in consultation and follow up. I personally saw, examined and provided care for the patient. Radiographs, labs and medication list were reviewed by me independently. I spoke with bedside nursing, respiratory therapists and consultants. Critical care services and times documented are independent of procedures and multidisciplinary rounds with Residents. Additionally comprehensive, multidisciplinary rounds were conducted with the MICU team. The case was discussed in detail and plans for care were established. Review of Residents documentation was conducted and revisions were made as appropriate. I agree with the the above documented information. I was personally present during the completion of procedure.     Cecilia Ott
WDL

## 2022-03-20 NOTE — PLAN OF CARE
Problem: Airway Clearance - Ineffective:  Goal: Ability to maintain a clear airway will improve  Description: Ability to maintain a clear airway will improve  3/20/2022 0946 by Violeta Orozco  Outcome: Ongoing     Problem: Anxiety/Stress:  Goal: Level of anxiety will decrease  Description: Level of anxiety will decrease  3/20/2022 0946 by Violeta Orozco  Outcome: Not Met This Shift     Problem: Aspiration:  Goal: Absence of aspiration  Description: Absence of aspiration  3/20/2022 0946 by Violeta Orozco  Outcome: Met This Shift     Problem:  Bowel Function - Altered:  Goal: Bowel elimination is within specified parameters  Description: Bowel elimination is within specified parameters  3/20/2022 0946 by Violeta Orozco  Outcome: Ongoing     Problem: Cardiac Output - Decreased:  Goal: Hemodynamic stability will improve  Description: Hemodynamic stability will improve  3/20/2022 0946 by Violeta Orozco  Outcome: Ongoing     Problem: Fluid Volume - Imbalance:  Goal: Absence of imbalanced fluid volume signs and symptoms  Description: Absence of imbalanced fluid volume signs and symptoms  3/20/2022 0946 by Violeta Orozco  Outcome: Ongoing     Problem: Gas Exchange - Impaired:  Goal: Levels of oxygenation will improve  Description: Levels of oxygenation will improve  3/20/2022 0946 by Violeta Orozco  Outcome: Met This Shift     Problem: Mental Status - Impaired:  Goal: Mental status will be restored to baseline  Description: Mental status will be restored to baseline  3/20/2022 0946 by Violeta Orozco  Outcome: Ongoing     Problem: Nutrition Deficit:  Goal: Ability to achieve adequate nutritional intake will improve  Description: Ability to achieve adequate nutritional intake will improve  3/20/2022 0946 by Violeta Orozco  Outcome: Ongoing     Problem: Pain:  Goal: Recognizes and communicates pain  Description: Recognizes and communicates pain  3/20/2022 0946 by Violeta Orozco  Outcome: Met This Shift     Problem: Pain:  Goal: Control of acute pain  Description: Control of acute pain  3/20/2022 0946 by Jordan Clarke  Outcome: Ongoing     Problem: Pain:  Goal: Control of chronic pain  Description: Control of chronic pain  3/20/2022 0946 by Jordan Clarke  Outcome: Ongoing     Problem: Serum Glucose Level - Abnormal:  Goal: Ability to maintain appropriate glucose levels will improve to within specified parameters  Description: Ability to maintain appropriate glucose levels will improve to within specified parameters  3/20/2022 0946 by Jordan Clarke  Outcome: Met This Shift     Problem: Skin Integrity - Impaired:  Goal: Will show no infection signs and symptoms  Description: Will show no infection signs and symptoms  3/20/2022 0946 by Jordan Clarke  Outcome: Met This Shift     Problem: Skin Integrity - Impaired:  Goal: Absence of new skin breakdown  Description: Absence of new skin breakdown  3/20/2022 0946 by Jordan Clarke  Outcome: Met This Shift     Problem: Falls - Risk of:  Goal: Will remain free from falls  Description: Will remain free from falls  3/20/2022 0946 by Jordan Clarke  Outcome: Met This Shift     Problem: Falls - Risk of:  Goal: Absence of physical injury  Description: Absence of physical injury  3/20/2022 0946 by Jordan Clarke  Outcome: Met This Shift

## 2022-03-20 NOTE — PROGRESS NOTES
Critical Care Team - Daily Progress Note         Date and time: 3/20/2022 3:04 PM  Patient's name:  [place] [address] Record Number: [id number]  Patient's account/billing number: [de-identified]  Patient's YOB: 1955   Age: 74 y.o. Date of Admission: 3/8/2022  4:28 AM  Length of stay during current admission: 12      Primary Care Physician: Melina Farr DO  ICU Attending Physician: Dr. Braun Simple    Code Status: Full Code    Reason for ICU admission: Respiratory failure, shock      SUBJECTIVE:     OVERNIGHT EVENTS:    3/20: atrial fibrillation with RVR again, amiodarone drip restarted     3/19: Patient with Afib RVR overnight, hx of Afib RVR. Denied any chest pain, alert and able to converse with staff per report. Cardiology on board and informed, placed on cardizem drip. Patient was remaining with tachycardia and ordered Amio drip. Prior to beginning amio, patient spontaneously converted to sinus rhythm. Patient denies any pain, on BiPAP. 3/18: Patient was extubated on 3/17. Patient is tolerating BiPAP well. She did have an episode overnight of agitation trying to pull off her BiPAP and nasal cannula but when she desaturated she understood that she needed to keep those on. She is still asking for water and food. 3/17: Vent day 10. Patient failed weaning trial again yesterday. Patient experienced episode of tachycardia that was found to be A. fib with RVR. Patient was cardioverted on 3/16. Patient was also started on amiodarone drip. This morning she is in sinus rhythm. Patient was agitated overnight. Patient's agitation resolved with oxycodone administration. Patient remains on Versed, fentanyl, Precedex. She is still on Seroquel. Tube feeds are still running at this time. 3/16: Vent day 9, patient attempted weaning trial yesterday without success. Patient with agitation reported over night Remains on Versed 5 mg , Fentanyl 200 mcg Precedex 1.5 mcg. On Seroquel. Tube feeds running at 50.     3/15: Vent day #8. Patient was still agitated overnight. Patient was unable to be weaned off vent yesterday. We will likely try again today. She remains on Versed, fentanyl, Precedex. This morning she did experience a drop in her blood sugars, likely due to the decrease in steroids. Tube feeds are still running at this time. Lantus will be adjusted today. 3/14: Vent day #7. Overnight patient was extremely agitated. Patient pulled art line. Patient had to be sedated because she kept trying to pull out her ETT. She is currently on versed, fentanyl and precedex. Patient tolerated Seroquel well. 3/13: Patient did not experience any acute events overnight. She has been very agitated however and trying to pull at tube. She is still on pressors at this time. 3/12: Pressors decreased. Mentation has not been appropriate yet. Tolerating tube feeds. 3/11: Patient had no acute problems overnight. She is currently being weaned off of vaso and weaned off sedation. She has been persistently hyperglycemic. 3/10: Pressors were changed from levo to rachelle. She would awake intermittently and be agitated. Precedex was started. Patient is tolerating tube feeds as well. 3/9: Patient has converted to sinus rhythm, opening eyes with spells of anxious awakenings. Levophed stopped. Remains on Vent. 3/8: Patient presented to the ICU with hypotension and respiratory failure. Patient was started on pressors and antibiotics at that time. Patient was also started on amiodarone drip for her A. fib. Patient was also started on insulin drip.     CURRENT VENTILATION STATUS:     [] Ventilator  [x] BIPAP  [x] Nasal Cannula [] Room Air      IF INTUBATED, ET TUBE MARKING AT LOWER LIP:  cms    SECRETIONS  Amount:  [] Small [] Moderate  [] Large  [x] None  Color:     [] White [] Colored  [] Bloody    SEDATION:  RAAS Score:  [] Propofol gtt  [] Versed gtt  [] Fentanyl gtt [] Ativan gtt   [] Precedex    PARALYZED:  [x] No    [] Yes      VASOPRESSORS:  [x] No    [] Yes    If yes -   [] Levophed       [] Dopamine     [] Vasopressin       [] Dobutamine  [] Phenylephrine         [] Epinephrine     CENTRAL LINES:     [x] No   [] Yes   (Date of Insertion: 3/8--> needs changed)           If yes -     [] Right IJ     [] Left IJ [] Right Femoral [] Left Femoral                   [] Right Subclavian [] Left Subclavian       BRAUN'S CATHETER:   [] No   [x] Yes  (Date of Insertion: )     URINE OUTPUT:            [x] Good   [] Low              [] Anuric      OBJECTIVE:     VITAL SIGNS:  BP 93/62   Pulse 152   Temp 99 °F (37.2 °C) (Bladder)   Resp (!) 31   Ht 5' 4\" (1.626 m)   Wt (!) 330 lb 3.2 oz (149.8 kg)   LMP  (LMP Unknown)   SpO2 93%   BMI 56.68 kg/m²   Tmax over 24 hours:  Temp (24hrs), Av.6 °F (37.6 °C), Min:99 °F (37.2 °C), Max:100.2 °F (37.9 °C)      Patient Vitals for the past 6 hrs:   BP Temp Temp src Pulse Resp SpO2   22 1500 93/62 -- -- 152 (!) 31 93 %   22 1400 94/62 99 °F (37.2 °C) Bladder 152 (!) 48 97 %   22 1300 102/60 -- -- 159 (!) 41 90 %   22 1200 (!) 95/57 99.9 °F (37.7 °C) Bladder 156 (!) 35 100 %   22 1100 (!) 124/57 -- -- 170 26 93 %   22 1000 (!) 116/58 100.2 °F (37.9 °C) Bladder 168 (!) 33 100 %   22 0930 (!) 91/52 -- -- 160 19 95 %         Intake/Output Summary (Last 24 hours) at 3/20/2022 1504  Last data filed at 3/20/2022 1500  Gross per 24 hour   Intake 3936.77 ml   Output 2390 ml   Net 1546.77 ml     Wt Readings from Last 2 Encounters:   22 (!) 330 lb 3.2 oz (149.8 kg)   21 (!) 304 lb (137.9 kg)     Body mass index is 56.68 kg/m². PHYSICAL EXAMINATION:    Physical Exam  Constitutional:       Appearance: She is morbidly obese. She is ill-appearing. On BiPaP mask      Interventions: She is awake. HENT:      Head: Normocephalic and atraumatic.    Eyes:      Conjunctiva/sclera: Conjunctivae normal.   Neck: Trachea: No tracheal deviation. Cardiovascular:      Rate and Rhythm: Fast rate, irregular rhythm     Heart sounds: Normal heart sounds. Pulmonary:      Effort: Pulmonary effort is normal. BiPAP on. Breath sounds: Decreased breath sounds present, improving  Abdominal:      General: Bowel sounds are normal.      Palpations: Abdomen is soft. Tenderness: There is no abdominal tenderness. Musculoskeletal:      Cervical back: Neck supple. Right lower leg: Edema present. Left lower leg: Edema present. Lymphadenopathy:      Cervical: No cervical adenopathy. Skin:     General: Skin is warm and dry. Findings: No rash. Comments: +bl lower extremity lymphedema chronic skin changes with right leg fissure erythma   Neurological:      General: No focal deficit present.    Psychiatric:         Behavior: Behavior normal.      Any additional physical findings: n/a    MEDICATIONS:    Scheduled Meds:   metoprolol succinate  100 mg Oral BID    potassium bicarb-citric acid  40 mEq Oral Q4H    DULoxetine  30 mg Oral QAM    DULoxetine  60 mg Oral Nightly    hydrOXYzine  50 mg Oral TID    bumetanide  1 mg IntraVENous Daily    cephALEXin  1,000 mg Oral 3 times per day    doxycycline hyclate  100 mg Oral 2 times per day    naloxegol  25 mg Oral QAM    ipratropium-albuterol  1 ampule Inhalation Q6H WA    Arformoterol Tartrate  15 mcg Nebulization BID    budesonide  0.5 mg Nebulization BID    insulin glargine  30 Units SubCUTAneous BID    sodium chloride flush  5-40 mL IntraVENous 2 times per day    QUEtiapine  50 mg Oral BID    insulin lispro  0-18 Units SubCUTAneous Q4H    docusate  100 mg Per NG tube BID    senna  10 mL Per NG tube BID    enoxaparin  1 mg/kg SubCUTAneous BID    pantoprazole  40 mg IntraVENous Daily    miconazole   Topical BID     Continuous Infusions:   amiodarone 1 mg/min (03/20/22 1035)    Followed by   Gorge Garcia amiodarone      sodium chloride      dextrose      sodium chloride 10 mL/hr at 03/18/22 1642     PRN Meds:   diazePAM, 5 mg, Q6H PRN  oxyCODONE, 10 mg, Q6H PRN  perflutren lipid microspheres, 1.5 mL, ONCE PRN  ipratropium-albuterol, 1 ampule, Q4H PRN  sodium chloride flush, 5-40 mL, PRN  sodium chloride, 25 mL, PRN  white petrolatum, , BID PRN  fentanNYL, 25 mcg, Q2H PRN  dextrose bolus (hypoglycemia), 125 mL, PRN   Or  dextrose bolus (hypoglycemia), 250 mL, PRN  glucose, 15 g, PRN  glucagon (rDNA), 1 mg, PRN  dextrose, 100 mL/hr, PRN  midazolam, 2 mg, Q3H PRN  acetaminophen, 650 mg, Q4H PRN  acetaminophen, 650 mg, Q4H PRN          VENT SETTINGS (Comprehensive) (if applicable):  Vent Information  $Ventilation: Off Vent  Skin Assessment: Clean, dry, & intact  Equipment ID: [id number]  Vent Type: [id number]  Vent Mode: (S) AC/PC  Vt Ordered: 450 mL  Pressure Ordered: (S) 30  Rate Set: 0 bmp  Peak Flow: 0 L/min  Pressure Support: 0 cmH20  FiO2 : 35 %  SpO2: 93 %  SpO2/FiO2 ratio: 285.71  Sensitivity: 2  PEEP/CPAP: 5  I Time/ I Time %: 0.9 s  Humidification Source: Heated wire  Humidification Temp: 37  Humidification Temp Measured: 37.1  Circuit Condensation: Drained  Mask Type: Full face mask  Mask Size: Medium  Additional Respiratory  Assessments  Pulse: 152  Resp: (!) 31  SpO2: 93 %  Position: Semi-Chapin's  Humidification Source: Heated wire  Humidification Temp: 37  Circuit Condensation: Drained  Oral Care: Mouth swabbed,Lip moisturizer applied  Subglottic Suction Done?: Yes  Cuff Pressure (cm H2O): 29 cm H2O    ABGs:   Recent Labs     03/19/22  1408   PH 7.487*   PCO2 45.8*   PO2 109.5*   HCO3 33.9*   BE 9.5*   O2SAT 97.9       Laboratory findings:    Complete Blood Count:   Recent Labs     03/18/22  0430 03/19/22  0538 03/20/22  0602   WBC 9.6 11.2 9.0   HGB 7.6* 7.9* 7.2*   HCT 26.7* 27.0* 25.0*    369 330        Last 3 Blood Glucose:   Recent Labs     03/18/22  0430 03/19/22  0538 03/20/22  0602   GLUCOSE 134* 231* 187*        PT/INR:    Lab Results   Component Value Date    PROTIME 19.5 04/29/2021    INR 1.8 04/29/2021     PTT:    Lab Results   Component Value Date    APTT 36.3 04/29/2021       Comprehensive Metabolic Profile:   Recent Labs     03/18/22  0430 03/18/22  0430 03/19/22  0538 03/19/22  0538 03/20/22  0602     --  144  --  143   K 3.8  --  3.7  --  3.6     --  103  --  100   CO2 34*  --  35*  --  37*   BUN 21  --  20  --  16   CREATININE 0.7  --  0.8  --  0.7   GLUCOSE 134*  --  231*  --  187*   CALCIUM 7.8*  --  7.9*  --  8.1*   PROT 6.3*   < > 5.8*   < > 5.7*   LABALBU 2.8*   < > 2.6*   < > 2.5*   BILITOT 0.3   < > 0.4   < > 0.4   ALKPHOS 102   < > 95   < > 84   AST 18   < > 23   < > 28   ALT 45*   < > 34*   < > 31    < > = values in this interval not displayed. Magnesium:   Lab Results   Component Value Date    MG 1.8 03/20/2022     Phosphorus:   Lab Results   Component Value Date    PHOS 2.5 03/20/2022     Ionized Calcium:   Lab Results   Component Value Date    CAION 1.37 10/24/2016        Urinalysis: No results found for: UA     Troponin: No results for input(s): TROPONINI in the last 72 hours. Microbiology:   Culture, Blood 2   Date Value Ref Range Status   03/15/2022 24 Hours no growth  Preliminary       Cultures during this admission:     Blood cultures:  [] None drawn      [x] Negative             [x]  Positive (Details: 3/8 beta strep group G, repeats 3/9 negative)  Urine Culture:   [] None drawn      [x] Negative             []  Positive (Details:  )  Sputum Culture:  [x] None drawn      [] Negative             []  Positive (Details:  )   Wound Culture:  [] None drawn      [] Negative             [x]  Positive (Details: S. Aureus, Proteus sp 3/8  )     Other pertinent Labs:        Radiology/Imaging:     Chest Xray (3/20/2022): Improving aeration.  Continued follow-up recommended. ASSESSMENT:     Principal Problem:    Sepsis ([address]  Resolved Problems:    * No resolved hospital problems.  *      PLAN:     WEAN PER PROTOCOL:  [] No   [x] Yes  [] N/A    DISCONTINUE ANY LABS:   [x] No   [] Yes    ICU PROPHYLAXIS:  Stress ulcer:  [x] PPI Agent  [] [id number] [] Sucralfate  [] Other:  VTE:   [] Enoxaparin  [] Unfract. Heparin Subcut  [] EPC Cuffs    NUTRITION:  [x] NPO [x] Tube Feeding (Specify: ) [] TPN  [] PO (Diet: ADULT TUBE FEEDING; Nasogastric; Diabetic; Continuous; 10; Yes; 15; Q 8 hours; 50; 75; Q 4 hours  ADULT DIET; Clear Liquid)    HOME MEDICATIONS RECONCILED: [] No  [x] Yes    INSULIN DRIP:   [x] No   [] Yes    CONSULTATION NEEDED:  [x] No   [] Yes    FAMILY UPDATED:    [] No   [x] Yes    TRANSFER OUT OF ICU:   [] No   [] Yes    SYSTEMS ASSESSMENT    Neuro     Extubated on 3/17  OFF Fentanyl, Versed  Agitated at times    Agitation   Seroquel 50 mg BID  Precedex discontinued  Valium 5 mg every 6 hours  Oxycodone 10 mg every 6 hours    Respiratory     Metabolic Alkalosis with Respiratory Acidosis  S/P 10 day intubation, Extubated 3/17  Alternating between BiPAP and 6L NC   Home O2 requirements, 6L O2 NC  Wean oxygen as tolerated.  Keep O2 sat 90-92%  Pulmonology following, appreciate input    Cardiovascular     Paroxysmal atrial fibrillation  On Cardizem drip   Amio drip ordered, bu currently held as patient in Sinus   Increase Metoprolol to 100 mg BID per cardiology   Cardiology Consulted  Continue to monitor  Therapeutic Lovenox  S/p Cardioversion 3/16     Septic shock  Solu-Cortef 25 mg Q8H  Blood cultures positive Beta strep group G 3/8, repeats negative  Wound culture 3/8 Staph aureus and Proteus sp  Patient remains febrile  IV NS 20 mL/hr  See infectious disease below    Fluid overload  Bumex 1 mg twice daily  Strict I's and O's  Continue to monitor    Gastrointestinal     Possible Cholecystitis  Ultrasound 3/11 gallstones and edematous gallbladder wall  Consider surgery consult    GI PPx  Protonix 40 mg IV daily    Bowel Regimen  Colace 100 mg BID  Senna daily    Renal     CKD stage III, stable  Baseline creatinine 1. 1-1.2  Continue to monitor    Electrolyte abnormalities  Replete as needed  Continue to monitor     Infectious Disease     Group G Streptococcus Septicemia  Likely 2/2 to cellulitis of leg (however, cultures grew Proteus)  Blood cultures positive x2 for beta strep group G  S/P Ceftriaxone 2g Q24H (completed 7 days with 1 day of cefepime prior)  Linezolid 600 mg Q12H (Day 7) discontinue  On Cephalexin and Doxycycline   ID following, appreciate input    Leukocytosis, resolved  Likely secondary to infection and steroid use  Continue to monitor    Febrile, resolved  Tylenol suppository every 4 hours as needed for fever    Hematology/Oncology     Anticoagulation  Lovenox 135 mg twice daily      Acute on Chronic Normocytic Anemia  Baseline Hgb 9  Continue to monitor  Transfuse for Hgb <7    Endocrine     T2DM  Lantus to 30 units twice daily  HDSSI  Hypoglycemia protocol  POC glucose checks    Social/Spiritual/DNR/Other     Full cod e  will try to plan for possible LTAC if patient able to remain stable for > 24 hours    Xu Malik MD, PGY-1            3/20/2022, 3:04 PM    Attending Physician Attestation: Dr. Xu Malik    Thank you very much for allowing me to see this patient in consultation and follow up. I personally saw, examined and provided care for the patient. Radiographs, labs and medication list were reviewed by me independently. I spoke with bedside nursing, respiratory therapists and consultants. Critical care services and times documented are independent of procedures and multidisciplinary rounds with Residents. Additionally comprehensive, multidisciplinary rounds were conducted with the MICU team. The case was discussed in detail and plans for care were established. Review of Residents documentation was conducted and revisions were made as appropriate. I agree with the the above documented information.      Physical Examination:  Vitals:   Vitals:    03/20/22 1200 03/20/22 1300 03/20/22 1400 03/20/22 1500   BP: (!) 95/57 102/60 94/62 93/62   Pulse: 156 159 152 152   Resp: (!) 35 (!) 41 (!) 48 (!) 31   Temp: 99.9 °F (37.7 °C)  99 °F (37.2 °C)    TempSrc: Bladder  Bladder    SpO2: 100% 90% 97% 93%   Weight:       Height:          General: No Acute Distress  HEENT: normocephalic, atraumatic  - no audible stridor in neck anteriorly   CVS: RRR, S1, S2, No S3 or S4  Abdomen: soft, NT, ND  Respiratory: decreased breath sounds at lung bases, no focal wheezes noted  Extremities: no clubbing/cyanosis/edema  Neuro: grossly intact, phonates     ASSESSMENT:  · Severe Sepsis with Septic Shock - lower extremity cellulitis, Grp G Streptococcus bacteremia/septicemia   · Probable cellulitis right lower extremity  · Group G Streptococcus septicemia probably associated to cellulitis cultures of the leg, however, grew Proteus  · Possible intra-abdominal infection.  Possible cholecystitis  · Acute Respiratory Failure - on ACVC mechanical ventilation    · Leukocytosis, improving  · Chronic Hypoxic Respiratory Failure - baseline 6L O2 NC  · Moderate/Severe COPD  · Elevation of transaminases.  Possible cholecystitis.  They seem to be trending downward.  Ultrasound shows gallstones and edematous gallbladder wall  · Morbid Obesity - BMI 51  · Atrial Fibrillation with RVR - DCCV x 1 cardioversion 3/16/2022, cardizem and amiodarone given, with initiation of amiodarone drip    · Chronic Diastolic CHF Dysfunction   · Chronic Lymphedema of the Lower Extremities      In addition the following applies:     Check: N/A  Medication Alterations: amiodarone, metoprolol, D/C solu-cortef, bumex daily 1 mg IV, restart Cymbalta and Hydroxyzine  Procedures: N/A  Imaging: reviewed  New Consultations: N/A  VENT: N/A  - s/p extubation 3/17/2022 after 10 days on mechanical ventilation ACVC/ACPC     Access: Right PICC, Left IJ TLC   Consults: ID, Dietary, Cardiology, Pulmonary     Drips: Cardizem to Amiodarone  Nutrition: N/A  ABX: Cephalexin, Doxycycline (Day 11/14)  Completed ABX: Ceftriaxone,  Linezolid     - DCCV x 1 cardioversion 3/16/2022, cardizem and amiodarone given, with initiation of amiodarone drip    - liberation from vent to AVAPS on 3/17/2022, family present and updated at bedside  - 13L + at this time as of 3/19/2022, diuresis, supportive care  - patient already on therapeutic anticoagulation with lovenox subcutaneous   - OXY IR to PRN  - Valium to PRN  - metoprolol to 100 mg PO BID then future transition to toprol XL  - IV amiodarone per cardiology   - TSH 3.160  - untreated and undiagnosed SAM likely, outpatient PSG and PFT will be warranted for patient   - consideration to [facility] placement for oxygen wean/BIPAP/AVAPS  - patient to remain in ICU level of care at this time    Thank you for allowing me to participate in the care of this patient. Care reviewed with nursing staff, medical and surgical specialty care, primary care and the patient's family as available. Restraints are ordered when the patient can do harm to him/herself by pulling out devices. Critical Care Time: > 35 minutes excluding procedures    Carmela Stern M.D.     Carmela Stern MD  3/20/2022  3:04 PM

## 2022-03-20 NOTE — PROGRESS NOTES
Patient's HR increases from 103 to 151 at 0829. Cardizem titrated up per order. Dr. Aparna Martin notified of finding at 200. Instructed to continue titrating cardizem per order and that Dr. Aparna Martin would discuss case with Dr. Lorie Layne. Dr. Aparna Martni and Dr. Lorie Layne discuss case and plan is to switch patient to amiodarone IV. Cardiology NP Vik Jhaveri messaged via perfect serve, as no orders have been received at this time for amiodarone. Awaiting return message/orders.

## 2022-03-20 NOTE — PROGRESS NOTES
INPATIENT CARDIOLOGY FOLLOW-UP    Name: Trinh Mason    Age: 74 y.o. Date of Admission: 3/8/2022  4:28 AM    Date of Service: 3/20/2022    Chief Complaint: Follow-up for atrial fibrillation with RVR    Interim History:  Currently on BiPAP. No chest pain or palpitations. +recurrent AF with RVR this AM (3/20/22).     Review of Systems:   Cardiac: As per HPI  General: No fever, chills  Pulmonary: As per HPI  HEENT: No visual disturbances, difficult swallowing  GI: No nausea, vomiting  : No dysuria, hematuria  Endocrine: No thyroid disease, +DM  Musculoskeletal: MEHTA x 4, no focal motor deficits  Skin: LE stasis dermatitis, no rashes  Neuro: No headache, seizures  Psych: Currently with no depression, anxiety    Problem List:  Patient Active Problem List   Diagnosis    Uncontrolled diabetes mellitus ([address]    Depression    Essential hypertension    Hyperlipidemia    Osteoarthritis    PAF (paroxysmal atrial fibrillation) (HCC) - currently in SR    Pulmonary hypertension    Chronic sinusitis    Chronic pain    Steatohepatitis    Baker's cyst of knee    Diabetic neuropathy (HCC)    Iron deficiency    LVH (left ventricular hypertrophy)    Chronic anal fissure    Positive hepatitis C antibody test    PFO (patent foramen ovale)    YEIMI (acute kidney injury) ([address]    Chronic diastolic heart failure (HCC)    Physical deconditioning    Lymphedema of both lower extremities    Chronic anemia    Dyspnea on exertion    Chronic deep vein thrombosis (DVT) of distal vein of right lower extremity (HCC)    GI bleed    Anxiety and depression    Chronic anticoagulation    COPD exacerbation (HCC)    Acute on chronic respiratory failure with hypoxia (HCC)    Blood loss anemia    Cellulitis of left lower extremity    Poorly controlled diabetes mellitus (HCC)    Lymphedema    Septicemia (HCC)    Acute diastolic congestive heart failure (HCC)    Anemia    Morbid obesity with BMI of 45.0-49.9, adult ([address]    Acute hypoxemic respiratory failure (HCC)    Diastolic CHF, acute on chronic (HCC)    Cellulitis    Stress fracture of right fibula    Atrial fibrillation, currently in sinus rhythm    Gastroesophageal reflux disease without esophagitis    Ulcers of both lower legs (HCC)    Chronic respiratory failure with hypoxia (HCC)    Abscess and cellulitis of gluteal region    CHF (congestive heart failure), NYHA class I, acute on chronic, combined ([address]    Adrenal mass (HCC)    Hyperglycemia    Atrial fibrillation ([address]    Infection due to extended-spectrum beta-lactamase-producing Escherichia coli    Bilateral cellulitis of lower leg    Ambulatory dysfunction    Chronic respiratory failure with hypercapnia (HCC)    Moderate pulmonary hypertension (HCC)    QT prolongation    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[address]    Sepsis ([address]       Allergies:   Allergies   Allergen Reactions    Statins Myalgia       Current Medications:  Current Facility-Administered Medications   Medication Dose Route Frequency Provider Last Rate Last Admin    metoprolol succinate (TOPROL XL) extended release tablet 100 mg  100 mg Oral BID Natasha Strickland MD   100 mg at 03/20/22 0511    amiodarone (CORDARONE) 450 mg in dextrose 5 % 250 mL infusion  1 mg/min IntraVENous Continuous Natasha Strickland MD 33.3 mL/hr at 03/20/22 1035 1 mg/min at 03/20/22 1035    Followed by   Srinivas Burdick amiodarone (CORDARONE) 450 mg in dextrose 5 % 250 mL infusion  0.5 mg/min IntraVENous Continuous Natasha Strickland MD        potassium bicarb-citric acid (EFFER-K) effervescent tablet 40 mEq  40 mEq Oral Q4H Kofi Joe MD   40 mEq at 03/20/22 1145    magnesium sulfate 2000 mg in 50 mL IVPB premix  2,000 mg IntraVENous Once Kofi Joe MD 25 mL/hr at 03/20/22 1154 2,000 mg at 03/20/22 1154    DULoxetine (CYMBALTA) extended release capsule 30 mg  30 mg Oral RACQUEL Langston DO   30 mg at 03/20/22 1208    DULoxetine (CYMBALTA) extended release capsule 60 mg  60 mg Oral Nightly Nitesh VALARIE Langston, DO        hydrOXYzine (ATARAX) tablet 50 mg  50 mg Oral TID Algerberta Langston, DO        diazePAM (VALIUM) tablet 5 mg  5 mg Oral Q6H PRN Ruba Abarca MD        oxyCODONE (ROXICODONE) 5 MG/5ML solution 10 mg  10 mg Oral Q6H PRN Ruba Abarca MD        bumetanide [facility]) injection 1 mg  1 mg IntraVENous Daily Ruba Abarca MD   1 mg at 03/20/22 1146    perflutren lipid microspheres (DEFINITY) injection 1.65 mg  1.5 mL IntraVENous ONCE PRN Jed Lomeli MD        ceph[facility]) capsule 1,000 mg  1,000 mg Oral 3 times per day Katelin Javier MD   1,000 mg at 03/20/22 5885    doxycycline hyclate (VIBRAMYCIN) capsule 100 mg  100 mg Oral 2 times per day Katelin Javier MD   100 mg at 03/20/22 0758    naloxegol (MOVANTIK) tablet 25 mg  25 mg Oral QAM Ruba Abarca MD   25 mg at 03/20/22 0756    ipratropium-albuterol (DUONEB) nebulizer solution 1 ampule  1 ampule Inhalation Q6H WA Ruba Abarca MD   1 ampule at 03/20/22 0830    Arformoterol Tartrate (BROVANA) nebulizer solution 15 mcg  15 mcg Nebulization BID Ruba Abarca MD   15 mcg at 03/20/22 0830    budesonide (PULMICORT) nebulizer suspension 500 mcg  0.5 mg Nebulization BID Ruba Abarca MD   500 mcg at 03/20/22 0830    ipratropium-albuterol (DUONEB) nebulizer solution 1 ampule  1 ampule Inhalation Q4H PRN Ruba Abarca MD   1 ampule at 03/16/22 1011    insulin glargine (LANTUS) injection vial 30 Units  30 Units SubCUTAneous BID Oleta Maryjo Christine Langston, DO   30 Units at 03/20/22 0804    sodium chloride flush 0.9 % injection 5-40 mL  5-40 mL IntraVENous 2 times per day Ruba Abarca MD   10 mL at 03/20/22 0758    sodium chloride flush 0.9 % injection 5-40 mL  5-40 mL IntraVENous PRN Ruba Abarca MD   10 mL at 03/19/22 1533    0.9 % sodium chloride infusion  25 mL IntraVENous PRN Ruba Abarca MD        QUEtiapine (SEROQUEL) tablet 50 mg  50 mg Oral BID Gilma Pore Tofil Hiawatha, DO   50 mg at 03/20/22 6064    white petrolatum ointment   Topical BID PRN Gilma Pore Tofil Hiawatha, DO        fentaNYL (SUBLIMAZE) injection 25 mcg  25 mcg IntraVENous Q2H PRN [facility], [facility]   25 mcg at 03/17/22 1959    insulin lispro (HUMALOG) injection vial 0-18 Units  0-18 Units SubCUTAneous Q4H Evin Arndt, DO   6 Units at 03/20/22 1211    docusate (COLACE) 50 MG/5ML liquid 100 mg  100 mg Per NG tube BID [facility], DO   100 mg at 03/20/22 0757    senna (SENOKOT) 8.8 MG/5ML syrup 17.6 mg  10 mL Per NG tube BID [facility] Suad Mcdanielon, DO   17.6 mg at 03/20/22 1157    enoxaparin (LOVENOX) injection 135 mg  1 mg/kg SubCUTAneous BID [facility] Suad Mcdanielon, DO   135 mg at 03/20/22 0756    pantoprazole (PROTONIX) injection 40 mg  40 mg IntraVENous Daily [facility] Suad Mcdanielon, DO   40 mg at 03/20/22 8407    dextrose bolus (hypoglycemia) 10% 125 mL  125 mL IntraVENous PRN Scheryl Million, DO        Or    dextrose bolus (hypoglycemia) 10% 250 mL  250 mL IntraVENous PRN Scheryl Million, DO        glucose (GLUTOSE) 40 % oral gel 15 g  15 g Oral PRN Gilma Pore Tofil Hiawatha, DO        glucagon (rDNA) injection 1 mg  1 mg IntraMUSCular PRN Gilma Pore Tofil Hiawatha, DO        dextrose 5 % solution  100 mL/hr IntraVENous PRN Gilma Pore Tofil Hiawatha, DO        midazolam PF (VERSED) injection 2 mg  2 mg IntraVENous Q3H PRN [facility] Greener Jonason, DO   2 mg at 03/14/22 0350    acetaminophen (TYLENOL) tablet 650 mg  650 mg Oral Q4H PRN Pratik Higgins MD   650 mg at 03/20/22 0900    acetaminophen (TYLENOL) suppository 650 mg  650 mg Rectal Q4H PRN Pratik Higgins MD        miconazole (MICOTIN) 2 % powder   Topical BID Pratik Higgins MD   Given at 03/20/22 0803    0.9 % sodium chloride infusion   IntraVENous Continuous Pratik Higgins MD 10 mL/hr at 03/18/22 1642 Rate Verify at 03/18/22 1642      amiodarone 1 mg/min (03/20/22 1035)    Followed by   Saad Lin amiodarone      sodium chloride      dextrose      sodium chloride 10 mL/hr at 03/18/22 1642       Physical Exam:  /60   Pulse 159   Temp 99.9 °F (37.7 °C) (Bladder)   Resp (!) 41   Ht 5' 4\" (1.626 m)   Wt (!) 330 lb 3.2 oz (149.8 kg)   LMP  (LMP Unknown)   SpO2 90%   BMI 56.68 kg/m²   Wt Readings from Last 3 Encounters:   03/14/22 (!) 330 lb 3.2 oz (149.8 kg)   08/31/21 (!) 304 lb (137.9 kg)   08/19/21 (!) 305 lb 5.4 oz (138.5 kg)     Appearance: Awake, on BiPAP  Skin: LE stasis dermatitis, no rashes  Head: Normocephalic, atraumatic  Eyes: EOMI, no conjunctival erythema  ENMT: No pharyngeal erythema, MMM, no rhinorrhea  Neck: Supple, no carotid bruits  Lungs: Decreased BS B/L, no wheezing  Cardiac: IRRR, no murmurs apparent  Abdomen: Soft, nontender, +bowel sounds  Extremities: Moves all extremities x 4, +lower extremity edema  Neurologic: No focal motor deficits apparent, awake    Intake/Output:    Intake/Output Summary (Last 24 hours) at 3/20/2022 1322  Last data filed at 3/20/2022 1300  Gross per 24 hour   Intake 3936.77 ml   Output 1740 ml   Net 2196.77 ml     I/O this shift:   In: 480 [P.O.:480]  Out: 190 [Urine:190]    Laboratory Tests:  Recent Labs     03/18/22  0430 03/19/22  0538 03/20/22  0602    144 143   K 3.8 3.7 3.6    103 100   CO2 34* 35* 37*   BUN 21 20 16   CREATININE 0.7 0.8 0.7   GLUCOSE 134* 231* 187*   CALCIUM 7.8* 7.9* 8.1*     Lab Results   Component Value Date    MG 1.8 03/20/2022     Recent Labs     03/18/22  0430 03/19/22  0538 03/20/22  0602   ALKPHOS 102 95 84   ALT 45* 34* 31   AST 18 23 28   PROT 6.3* 5.8* 5.7*   BILITOT 0.3 0.4 0.4   LABALBU 2.8* 2.6* 2.5*     Recent Labs     03/18/22  0430 03/19/22  0538 03/20/22  0602   WBC 9.6 11.2 9.0   RBC 3.07* 3.15* 2.89*   HGB 7.6* 7.9* 7.2*   HCT 26.7* 27.0* 25.0*   MCV 87.0 85.7 86.5   MCH 24.8* 25.1* 24.9*   MCHC 28.5* 29.3* 28.8*   RDW 16.4* 16.4* 16.7*    369 330   MPV 10.4 10.5 10.0     Lab Results   Component Value Date    CKTOTAL 85 01/13/2021    CKMB 2.7 01/13/2021    TROPONINI 0.02 04/30/2021    TROPONINI 0.02 04/29/2021    TROPONINI <0.01 04/09/2021     No results for input(s): CKTOTAL, CKMB, CKMBINDEX, TROPHS in the last 72 hours. Lab Results   Component Value Date    INR 1.8 04/29/2021    INR 1.1 01/12/2021    INR 1.1 05/31/2020    PROTIME 19.5 (H) 04/29/2021    PROTIME 12.4 01/12/2021    PROTIME 12.4 05/31/2020     Lab Results   Component Value Date    TSH 3.160 03/19/2022     Lab Results   Component Value Date    LABA1C 7.6 (H) 03/09/2022     No results found for: EAG  Lab Results   Component Value Date    CHOL 355 (H) 01/16/2021    CHOL 287 (H) 05/26/2020    CHOL 318 (H) 11/07/2019     Lab Results   Component Value Date    TRIG 298 (H) 01/16/2021    TRIG 512 (H) 05/26/2020    TRIG 861 (H) 11/07/2019     Lab Results   Component Value Date    HDL 52 01/16/2021    HDL 33 05/26/2020    HDL 28 11/07/2019     Lab Results   Component Value Date    LDLCALC 243 (H) 01/16/2021    LDLCALC - (AA) 05/26/2020    LDLCALC - (AA) 11/07/2019     Lab Results   Component Value Date    LABVLDL 60 01/16/2021    LABVLDL - (AA) 05/26/2020    LABVLDL - (AA) 11/07/2019     No results found for: CHOLHDLRATIO  No results for input(s): PROBNP in the last 72 hours. Cardiac Tests:  Telemetry reviewed (date: 3/20/2022): SR --> recurrent AF with RVR as of ~ 19:50 PM on 3/18/22    Echocardiogram: 4/14/21 (Dr. Ike Azul)   Normal left ventricle size and systolic function. Ejection fraction is visually estimated at 60-65%. No regional wall motion abnormalities seen. Mild concentric left ventricular hypertrophy. Indeterminate diastolic function. The left atrium is mildly dilated. Moderately dilated right ventricle. Right ventricle global systolic function is normal . TAPSE 32 mm. Physiologic and/or trace mitral regurgitation is present. No hemodynamically significant aortic stenosis is present. Mild tricuspid regurgitation. RVSP is 69 mmHg.  Pulmonary hypertension is moderate . No evidence for hemodynamically significant pericardial effusion. Compared to prior echo of 2015, changes noted. Now, there is moderate   pulmonary hypertension with moderately dilated RV. Echocardiogram: 3/18/22 (Dr. Jose L Hines)   Left ventricle is normal in size . Moderate left ventricular concentric hypertrophy noted. No regional wall motion abnormalities seen. Ejection fraction is visually estimated at 65%. Normal right ventricular size and function. The left atrium is mildly dilated. Interatrial septum appears intact. Structurally normal mitral valve. Moderate mitral annular calcification. The aortic valve appears mildly sclerotic   Mild tricuspid regurgitation. RVSP is 47 mmHg. Pulmonary hypertension is mild . ASSESSMENT / PLAN:  1. Atrial fibrillation with RVR (known PAF prior to admission) -- in and out of AF this admission, recurrent AF with RVR as of this AM (3/20/22), normal TSH  2. Elevated hs-troponin (115 --> 135)  3. Acute on chronic hypoxic respiratory failure -- currently on BiPAP  4. Acute on chronic HFpEF  5. Cellulitis right lower extremity / sepsis  6. HTN  7. HLD  8. DM - Hgb A1c 10.2 --> 7.6  9. Chronic anemia -- most recent Hgb 7.9 --> 7.2  10. Probable SAM  11. PHTN  12. BMI 56.7  13. PFO  14. History of TIA    - Will switch BB back to Toprol XL (100 mg BID) today  - Will restart IV amiodarone today  - Currently on lovenox / resume OAC once able and as H/H stabilizes  - Monitor BMP and I/O's with ongoing IV diuresis  - Aggressive risk factor modifications / treatment of SMA as indicated  - Monitor CBC / PRBC's PRN  - Monitor telemetry  - Care per ID and ICU team  - Case discussed with Dr. Rachel Bright today    Greater than 35 minutes was spent counseling the patient, reviewing the rationale for the above recommendations and reviewing the patient's current medication list, problem list and results of all previously ordered testing.     Vernell Phoenix, MD  [facility] ([facility]) Cardiology

## 2022-03-20 NOTE — PROGRESS NOTES
[address] [place] [address] [facility]  [facility]  Progress Note      Chief Complaint   Patient presents with    Cough     started a couple days ago    Hyperglycemia      per EMS    Chills    Nausea     Zofran given per EMS       Subjective: The patient off sedation  She is on a BiPAP. 35%  Afebrile. Went into A. fib rapid ventricular response today but is awake and alert  Review of systems:  A 10 point review of systems was done. As stated above, otherwise negative.     Medications:   metoprolol succinate  100 mg Oral BID    potassium bicarb-citric acid  40 mEq Oral Q4H    magnesium sulfate  2,000 mg IntraVENous Once    DULoxetine  30 mg Oral QAM    DULoxetine  60 mg Oral Nightly    hydrOXYzine  50 mg Oral TID    bumetanide  1 mg IntraVENous Daily    cephALEXin  1,000 mg Oral 3 times per day    doxycycline hyclate  100 mg Oral 2 times per day    naloxegol  25 mg Oral QAM    ipratropium-albuterol  1 ampule Inhalation Q6H WA    Arformoterol Tartrate  15 mcg Nebulization BID    budesonide  0.5 mg Nebulization BID    insulin glargine  30 Units SubCUTAneous BID    sodium chloride flush  5-40 mL IntraVENous 2 times per day    QUEtiapine  50 mg Oral BID    insulin lispro  0-18 Units SubCUTAneous Q4H    docusate  100 mg Per NG tube BID    senna  10 mL Per NG tube BID    enoxaparin  1 mg/kg SubCUTAneous BID    pantoprazole  40 mg IntraVENous Daily    miconazole   Topical BID      amiodarone 1 mg/min (03/20/22 1035)    Followed by   Breana Almazan amiodarone      sodium chloride      dextrose      sodium chloride 10 mL/hr at 03/18/22 1642     diazePAM, oxyCODONE, perflutren lipid microspheres, ipratropium-albuterol, sodium chloride flush, sodium chloride, white petrolatum, fentanNYL, dextrose bolus (hypoglycemia) **OR** dextrose bolus (hypoglycemia), glucose, glucagon (rDNA), dextrose, midazolam, acetaminophen, acetaminophen    OBJECTIVE:  BP (!) 116/58   Pulse 168   Temp 100.2 °F (37.9 °C) (Bladder)   Resp (!) 33   Ht 5' 4\" (1.626 m)   Wt (!) 330 lb 3.2 oz (149.8 kg)   LMP  (LMP Unknown)   SpO2 100%   BMI 56.68 kg/m²   Temp  Av.6 °F (37.6 °C)  Min: 99 °F (37.2 °C)  Max: 100.2 °F (37.9 °C)   Constitutional: The patient is lying in bed in the ICU. She is on a BiPAP. She off sedation  Skin: Warm and dry. No rashes were noted. HEENT: Eyes show round, and reactive pupils. No jaundice. Moist mucous membranes, no ulcerations, no thrush. Neck: Supple to movements. No lymphadenopathy. Chest: No respiratory distress. No crackles. Cardiovascular: Heart sounds rhythmic and regular. Abdomen: Positive bowel heart sounds rhythmic and regular. Sounds to auscultation. Benign to palpation. Large and pendulous. Intertrigo improved. Extremities: Bilateral lower extremity moderate to severe lymphedema. No erythema. Lines: Left IJ TLC 3/8/2022.   Hansen catheter    Laboratory and Tests Review:  Lab Results   Component Value Date    WBC 9.0 2022    WBC 11.2 2022    WBC 9.6 2022    HGB 7.2 (L) 2022    HCT 25.0 (L) 2022    MCV 86.5 2022     2022     Lab Results   Component Value Date    NEUTROABS 6.32 2022    NEUTROABS 8.35 (H) 2022    NEUTROABS 6.77 2022     Lab Results   Component Value Date    CRP 42.1 (H) 2022    CRP 13.7 (H) 2022    CRP 5.3 (H) 2021     No results found for: CRPHS  Lab Results   Component Value Date    SEDRATE 98 (H) 2022    SEDRATE 30 (H) 2022    SEDRATE 41 (H) 2021     Lab Results   Component Value Date    ALT 31 2022    AST 28 2022    ALKPHOS 84 2022    BILITOT 0.4 2022     Lab Results   Component Value Date     2022    K 3.6 2022    K 3.6 2022     2022    CO2 37 2022    BUN 16 2022    CREATININE 0.7 2022    CREATININE 0.8 2022    CREATININE 0.7 2022    GFRAA >60 2022    LABGLOM >60 03/20/2022    GLUCOSE 187 03/20/2022    GLUCOSE 181 12/16/2011    PROT 5.7 03/20/2022    LABALBU 2.5 03/20/2022    LABALBU 4.5 11/02/2011    CALCIUM 8.1 03/20/2022    BILITOT 0.4 03/20/2022    ALKPHOS 84 03/20/2022    AST 28 03/20/2022    ALT 31 03/20/2022     Radiology:  Reviewed    Microbiology:   Rapid SARS-CoV-2: Negative  Respiratory panel: Negative  Blood cultures 3/8/2022: Group G Streptococcus in 4 of 4 bottles  Blood cultures 3/9/2022: Negative x2  Leg wound culture 3/8/2022: Proteus, MRSA, CoNS nonhemolytic Streptococcus  Nares screen MRSA: Positive    ASSESSMENT:  · Cellulitis right lower extremity. Wound cultures grew mixed organisms, including nonhemolytic Streptococcus, Proteus, MRSA and CoNS  · Group G Streptococcus septicemia probably associated to cellulitis. This has resolved. Repeat blood cultures were negative  · Possible intra-abdominal infection. Possible cholecystitis  · Sepsis with septic shock, improved  · Acute respiratory failure   · Leukocytosis, resolved  · Elevation of transaminases. Possible cholecystitis. They seem to be trending downward. Ultrasound shows gallstones and edematous gallbladder wall  · Probable drug-induced fever. She has a pattern of nonremitting fever.   She is only had 1 normal temperature in the last few days    PLAN:    · Cephalexin and Doxycycline, day 12 of 14   · We will follow with you  · Discussed with critical care        Michael Gentile MD  12:17 PM  3/20/2022

## 2022-03-20 NOTE — PROGRESS NOTES
Call placed to patient's son, El Conner, to discuss plan of care and obtain consent for blood transfusion. No answer at this time. Voice message left for El Conner to return call to ICU at [phone]. Awaiting return call.

## 2022-03-20 NOTE — PATIENT CARE CONFERENCE
Intensive Care Daily Quality Rounding Checklist        ICU Team Members: Dr. Jacqueline Negrete, Dr. Tawana Rowe (resident), charge nurse, bedside nurse     ICU Day #: NUMBER: 13     Intubation Date: N/A     Ventilator Day #: N/A     Central Line Insertion Date: N/A                                                    Day #:  N/A      Arterial Line Insertion Date:  N/A                             Day #: N/A     Temporary Hemodialysis Catheter Insertion Date:  N/A                             Day # N/A     DVT Prophylaxis: Lovenox    GI Prophylaxis: Protonix     Hansen Catheter Insertion Date: March 8                                        Day #: 13                             Continued need (if yes, reason documented and discussed with physician): yes, need for accurate I+O's---getting Bumex     Skin Issues/ Wounds and ordered treatment discussed on rounds: yes, has wound on coccyx, SOS precautions, on bariatric low air loss bed     Goals/ Plans for the Day: Daily labs and replace electrolytes, wean cardizem as able, wean oxygen as able, restart Bumex, continue critical care management, re-attempt PICC line

## 2022-03-20 NOTE — PROGRESS NOTES
Call placed to patient's son, Millie Valdez, to provide update on current condition, plan of care, and for patient to talk to him per her request. No answer at this time. Voice message left for Millie Valdez to return call to ICU at [phone]. Awaiting return call.

## 2022-03-20 NOTE — PROGRESS NOTES
Subjective:  On BiPAP on my evaluation  Conversant, resting comfortably  Responding to questions adequately  Back on IV amiodarone secondary to being back in A. fib RVR       Objective:    BP (!) 116/58   Pulse 168   Temp 100.2 °F (37.9 °C) (Bladder)   Resp (!) 33   Ht 5' 4\" (1.626 m)   Wt (!) 330 lb 3.2 oz (149.8 kg)   LMP  (LMP Unknown)   SpO2 100%   BMI 56.68 kg/m²     In: 3846.8 [P.O.:240; I.V.:359.8; NG/GT:3247]  Out: 2500   In: 3846.8   Out: 2500 [Urine:2500]    General appearance BiPAP in place awake, poor hygiene malodorous, morbid obesity  HEENT: AT/NC, MMM  Neck: FROM, supple  Lungs: Clear to auscultation  CV: Irregular, tachycardia no MRGs  Vasc: Radial pulses 2+  Abdomen: Soft, non-tender; no masses or HSM  Extremities: Ichthyosis bilateral lower extremities-bilateral lower extremities in dressing  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  *      Plan:    56-year-old woman with multiple comorbidities admitted to ICU setting for septic shock on triple pressor support, broad-spectrum IV antibiotic therapy, intubated sedated     Extubated 3/17/2022  Remains in ICU setting  IV fluid resuscitation for marked lactic acidosis-resolved  Off pressor support, start midodrine if needed  on Solu-Cortef 3 times daily-wean now the blood pressure is improved-contributing to marked elevation in blood sugars  -blood cultures with alpha hemolytic strep  Broad-spectrum IV antibiotics with infectious disease following-Merrem has been transitioned to Rocephin 3/9/2021-beta strep group G/ linezolid added-white count resolved  ruq us -hepatomegaly with diffuse fatty infiltration/gallstones and sludge  Glargine, insulin sliding scale for blood sugar management,     Amiodarone resumed now secondary to marked elevation and heart rate status post cardioversion for hemodynamic instability  IV amiodarone per ICU team-Case discussed with Dr. Whatley  Echocardiogram completed-normal LV  Cardiology following    Daily labs   Out of ICU once heart rate better controlled    DVT Prophylaxis   PT/OT  Discharge planning     Marco Antonio Guzman MD  12:25 PM  3/20/2022

## 2022-03-20 NOTE — PROGRESS NOTES
Patient to remain in ICU level of care at this time given recurrent atrial fibrillation with RVR. She will need outpatient PFT and PSG as discussed with her and family.     Christian Christensen MD  3/20/2022  3:04 PM

## 2022-03-20 NOTE — PROGRESS NOTES
Dr. Jose Maria Aguirre notified that LUE ultrasound results are read by radiologist. Dr. Jose Maria Aguirre reviewed results with Dr. Brad Kirkland. At this time, RN is instructed to continue applying ice pack to LUE. LUE remains elevated on pillows with ice pack applied. Pt states that she has full sensation to LUE with pain upon palpation and range of motion of arm. Patchy bruising to L inner upper arm noted, without increase from 0800 assessment. Diameter of LUE at site of previous midline catheter placement 46cm. Diameter of LUE proximal to the previous midline catheter site at largest diameter measures 55cm. Will continue to monitor.

## 2022-03-21 ENCOUNTER — APPOINTMENT (OUTPATIENT)
Dept: GENERAL RADIOLOGY | Age: 67
DRG: 870 | End: 2022-03-21
Payer: MEDICARE

## 2022-03-21 LAB
ALBUMIN SERPL-MCNC: 2.7 G/DL (ref 3.5–5.2)
ALP BLD-CCNC: 90 U/L (ref 35–104)
ALT SERPL-CCNC: 23 U/L (ref 0–32)
ANION GAP SERPL CALCULATED.3IONS-SCNC: 6 MMOL/L (ref 7–16)
ANISOCYTOSIS: ABNORMAL
AST SERPL-CCNC: 20 U/L (ref 0–31)
BASOPHILS ABSOLUTE: 0.04 E9/L (ref 0–0.2)
BASOPHILS RELATIVE PERCENT: 0.4 % (ref 0–2)
BILIRUB SERPL-MCNC: 0.6 MG/DL (ref 0–1.2)
BUN BLDV-MCNC: 15 MG/DL (ref 6–23)
CALCIUM SERPL-MCNC: 8.3 MG/DL (ref 8.6–10.2)
CHLORIDE BLD-SCNC: 96 MMOL/L (ref 98–107)
CO2: 36 MMOL/L (ref 22–29)
CREAT SERPL-MCNC: 0.7 MG/DL (ref 0.5–1)
EOSINOPHILS ABSOLUTE: 0.22 E9/L (ref 0.05–0.5)
EOSINOPHILS RELATIVE PERCENT: 2.3 % (ref 0–6)
GFR AFRICAN AMERICAN: >60
GFR NON-AFRICAN AMERICAN: >60 ML/MIN/1.73
GLUCOSE BLD-MCNC: 267 MG/DL (ref 74–99)
HCT VFR BLD CALC: 26.1 % (ref 34–48)
HCT VFR BLD CALC: 27.2 % (ref 34–48)
HEMOGLOBIN: 7.8 G/DL (ref 11.5–15.5)
HEMOGLOBIN: 7.8 G/DL (ref 11.5–15.5)
HYPOCHROMIA: ABNORMAL
IMMATURE GRANULOCYTES #: 0.04 E9/L
IMMATURE GRANULOCYTES %: 0.4 % (ref 0–5)
LYMPHOCYTES ABSOLUTE: 1.27 E9/L (ref 1.5–4)
LYMPHOCYTES RELATIVE PERCENT: 13.5 % (ref 20–42)
MAGNESIUM: 1.9 MG/DL (ref 1.6–2.6)
MCH RBC QN AUTO: 24.8 PG (ref 26–35)
MCHC RBC AUTO-ENTMCNC: 28.7 % (ref 32–34.5)
MCV RBC AUTO: 86.6 FL (ref 80–99.9)
METER GLUCOSE: 242 MG/DL (ref 74–99)
METER GLUCOSE: 244 MG/DL (ref 74–99)
METER GLUCOSE: 260 MG/DL (ref 74–99)
METER GLUCOSE: 260 MG/DL (ref 74–99)
METER GLUCOSE: 270 MG/DL (ref 74–99)
METER GLUCOSE: 347 MG/DL (ref 74–99)
MONOCYTES ABSOLUTE: 0.7 E9/L (ref 0.1–0.95)
MONOCYTES RELATIVE PERCENT: 7.5 % (ref 2–12)
NEUTROPHILS ABSOLUTE: 7.12 E9/L (ref 1.8–7.3)
NEUTROPHILS RELATIVE PERCENT: 75.9 % (ref 43–80)
PDW BLD-RTO: 16.4 FL (ref 11.5–15)
PHOSPHORUS: 2.6 MG/DL (ref 2.5–4.5)
PLATELET # BLD: 360 E9/L (ref 130–450)
PMV BLD AUTO: 10.4 FL (ref 7–12)
POIKILOCYTES: ABNORMAL
POLYCHROMASIA: ABNORMAL
POTASSIUM SERPL-SCNC: 4.5 MMOL/L (ref 3.5–5)
RBC # BLD: 3.14 E12/L (ref 3.5–5.5)
SODIUM BLD-SCNC: 138 MMOL/L (ref 132–146)
STOMATOCYTES: ABNORMAL
TARGET CELLS: ABNORMAL
TOTAL PROTEIN: 5.9 G/DL (ref 6.4–8.3)
WBC # BLD: 9.4 E9/L (ref 4.5–11.5)

## 2022-03-21 PROCEDURE — 6370000000 HC RX 637 (ALT 250 FOR IP): Performed by: INTERNAL MEDICINE

## 2022-03-21 PROCEDURE — 83735 ASSAY OF MAGNESIUM: CPT

## 2022-03-21 PROCEDURE — 2500000003 HC RX 250 WO HCPCS: Performed by: INTERNAL MEDICINE

## 2022-03-21 PROCEDURE — 71045 X-RAY EXAM CHEST 1 VIEW: CPT

## 2022-03-21 PROCEDURE — 6360000002 HC RX W HCPCS: Performed by: INTERNAL MEDICINE

## 2022-03-21 PROCEDURE — 6360000002 HC RX W HCPCS

## 2022-03-21 PROCEDURE — 94640 AIRWAY INHALATION TREATMENT: CPT

## 2022-03-21 PROCEDURE — 99233 SBSQ HOSP IP/OBS HIGH 50: CPT | Performed by: INTERNAL MEDICINE

## 2022-03-21 PROCEDURE — 6370000000 HC RX 637 (ALT 250 FOR IP): Performed by: STUDENT IN AN ORGANIZED HEALTH CARE EDUCATION/TRAINING PROGRAM

## 2022-03-21 PROCEDURE — 2580000003 HC RX 258: Performed by: INTERNAL MEDICINE

## 2022-03-21 PROCEDURE — 2000000000 HC ICU R&B

## 2022-03-21 PROCEDURE — 80053 COMPREHEN METABOLIC PANEL: CPT

## 2022-03-21 PROCEDURE — 84100 ASSAY OF PHOSPHORUS: CPT

## 2022-03-21 PROCEDURE — 82962 GLUCOSE BLOOD TEST: CPT

## 2022-03-21 PROCEDURE — 6370000000 HC RX 637 (ALT 250 FOR IP): Performed by: SPECIALIST

## 2022-03-21 PROCEDURE — 94660 CPAP INITIATION&MGMT: CPT

## 2022-03-21 PROCEDURE — C9113 INJ PANTOPRAZOLE SODIUM, VIA: HCPCS | Performed by: INTERNAL MEDICINE

## 2022-03-21 PROCEDURE — 36415 COLL VENOUS BLD VENIPUNCTURE: CPT

## 2022-03-21 PROCEDURE — 85025 COMPLETE CBC W/AUTO DIFF WBC: CPT

## 2022-03-21 PROCEDURE — 36592 COLLECT BLOOD FROM PICC: CPT

## 2022-03-21 RX ORDER — AMIODARONE HYDROCHLORIDE 200 MG/1
400 TABLET ORAL 2 TIMES DAILY
Status: DISCONTINUED | OUTPATIENT
Start: 2022-03-21 | End: 2022-03-22 | Stop reason: HOSPADM

## 2022-03-21 RX ORDER — QUETIAPINE FUMARATE 25 MG/1
25 TABLET, FILM COATED ORAL 2 TIMES DAILY
Status: DISCONTINUED | OUTPATIENT
Start: 2022-03-21 | End: 2022-03-22 | Stop reason: HOSPADM

## 2022-03-21 RX ORDER — MAGNESIUM SULFATE IN WATER 40 MG/ML
2000 INJECTION, SOLUTION INTRAVENOUS ONCE
Status: COMPLETED | OUTPATIENT
Start: 2022-03-21 | End: 2022-03-21

## 2022-03-21 RX ORDER — ONDANSETRON 2 MG/ML
4 INJECTION INTRAMUSCULAR; INTRAVENOUS EVERY 6 HOURS PRN
Status: DISCONTINUED | OUTPATIENT
Start: 2022-03-21 | End: 2022-03-21

## 2022-03-21 RX ORDER — ACETAMINOPHEN 160 MG/5ML
650 SOLUTION ORAL EVERY 4 HOURS PRN
Status: DISCONTINUED | OUTPATIENT
Start: 2022-03-21 | End: 2022-03-22 | Stop reason: HOSPADM

## 2022-03-21 RX ORDER — ONDANSETRON 2 MG/ML
INJECTION INTRAMUSCULAR; INTRAVENOUS
Status: COMPLETED
Start: 2022-03-21 | End: 2022-03-21

## 2022-03-21 RX ORDER — PANTOPRAZOLE SODIUM 40 MG/1
40 TABLET, DELAYED RELEASE ORAL
Status: DISCONTINUED | OUTPATIENT
Start: 2022-03-22 | End: 2022-03-22 | Stop reason: HOSPADM

## 2022-03-21 RX ADMIN — SODIUM CHLORIDE, PRESERVATIVE FREE 10 ML: 5 INJECTION INTRAVENOUS at 20:17

## 2022-03-21 RX ADMIN — CEPHALEXIN 1000 MG: 500 CAPSULE ORAL at 13:33

## 2022-03-21 RX ADMIN — DIAZEPAM 5 MG: 5 TABLET ORAL at 09:22

## 2022-03-21 RX ADMIN — IPRATROPIUM BROMIDE AND ALBUTEROL SULFATE 1 AMPULE: 2.5; .5 SOLUTION RESPIRATORY (INHALATION) at 20:03

## 2022-03-21 RX ADMIN — MAGNESIUM SULFATE HEPTAHYDRATE 2000 MG: 40 INJECTION, SOLUTION INTRAVENOUS at 10:51

## 2022-03-21 RX ADMIN — INSULIN GLARGINE 30 UNITS: 100 INJECTION, SOLUTION SUBCUTANEOUS at 20:26

## 2022-03-21 RX ADMIN — DOCUSATE SODIUM LIQUID 100 MG: 100 LIQUID ORAL at 09:22

## 2022-03-21 RX ADMIN — INSULIN LISPRO 9 UNITS: 100 INJECTION, SOLUTION INTRAVENOUS; SUBCUTANEOUS at 05:14

## 2022-03-21 RX ADMIN — HYDROXYZINE HYDROCHLORIDE 50 MG: 10 TABLET ORAL at 09:22

## 2022-03-21 RX ADMIN — HYDROXYZINE HYDROCHLORIDE 50 MG: 10 TABLET ORAL at 20:16

## 2022-03-21 RX ADMIN — MICONAZOLE NITRATE: 20 POWDER TOPICAL at 09:25

## 2022-03-21 RX ADMIN — ARFORMOTEROL TARTRATE 15 MCG: 15 SOLUTION RESPIRATORY (INHALATION) at 20:03

## 2022-03-21 RX ADMIN — HYDROXYZINE HYDROCHLORIDE 50 MG: 10 TABLET ORAL at 13:33

## 2022-03-21 RX ADMIN — QUETIAPINE FUMARATE 25 MG: 25 TABLET ORAL at 20:17

## 2022-03-21 RX ADMIN — CEPHALEXIN 1000 MG: 500 CAPSULE ORAL at 20:15

## 2022-03-21 RX ADMIN — DOXYCYCLINE HYCLATE 100 MG: 100 CAPSULE ORAL at 20:16

## 2022-03-21 RX ADMIN — BUDESONIDE 500 MCG: 0.5 SUSPENSION RESPIRATORY (INHALATION) at 20:03

## 2022-03-21 RX ADMIN — METOPROLOL SUCCINATE 100 MG: 100 TABLET, EXTENDED RELEASE ORAL at 09:22

## 2022-03-21 RX ADMIN — ENOXAPARIN SODIUM 135 MG: 150 INJECTION SUBCUTANEOUS at 10:01

## 2022-03-21 RX ADMIN — ARFORMOTEROL TARTRATE 15 MCG: 15 SOLUTION RESPIRATORY (INHALATION) at 08:17

## 2022-03-21 RX ADMIN — DULOXETINE HYDROCHLORIDE 60 MG: 60 CAPSULE, DELAYED RELEASE ORAL at 20:16

## 2022-03-21 RX ADMIN — DULOXETINE HYDROCHLORIDE 30 MG: 30 CAPSULE, DELAYED RELEASE ORAL at 09:23

## 2022-03-21 RX ADMIN — AMIODARONE HYDROCHLORIDE 400 MG: 200 TABLET ORAL at 20:16

## 2022-03-21 RX ADMIN — INSULIN LISPRO 9 UNITS: 100 INJECTION, SOLUTION INTRAVENOUS; SUBCUTANEOUS at 09:58

## 2022-03-21 RX ADMIN — METOPROLOL SUCCINATE 100 MG: 100 TABLET, EXTENDED RELEASE ORAL at 20:16

## 2022-03-21 RX ADMIN — ONDANSETRON 4 MG: 2 INJECTION INTRAMUSCULAR; INTRAVENOUS at 07:18

## 2022-03-21 RX ADMIN — CEPHALEXIN 1000 MG: 500 CAPSULE ORAL at 06:33

## 2022-03-21 RX ADMIN — BUMETANIDE 1 MG: 0.25 INJECTION INTRAMUSCULAR; INTRAVENOUS at 09:27

## 2022-03-21 RX ADMIN — ENOXAPARIN SODIUM 150 MG: 150 INJECTION SUBCUTANEOUS at 20:17

## 2022-03-21 RX ADMIN — INSULIN GLARGINE 30 UNITS: 100 INJECTION, SOLUTION SUBCUTANEOUS at 09:58

## 2022-03-21 RX ADMIN — PANTOPRAZOLE SODIUM 40 MG: 40 INJECTION, POWDER, FOR SOLUTION INTRAVENOUS at 09:22

## 2022-03-21 RX ADMIN — DEXTROSE 1 MG/MIN: 5 SOLUTION INTRAVENOUS at 03:44

## 2022-03-21 RX ADMIN — INSULIN LISPRO 9 UNITS: 100 INJECTION, SOLUTION INTRAVENOUS; SUBCUTANEOUS at 13:31

## 2022-03-21 RX ADMIN — INSULIN LISPRO 6 UNITS: 100 INJECTION, SOLUTION INTRAVENOUS; SUBCUTANEOUS at 20:26

## 2022-03-21 RX ADMIN — DEXTROSE 0.5 MG/MIN: 5 SOLUTION INTRAVENOUS at 14:56

## 2022-03-21 RX ADMIN — IPRATROPIUM BROMIDE AND ALBUTEROL SULFATE 1 AMPULE: 2.5; .5 SOLUTION RESPIRATORY (INHALATION) at 13:21

## 2022-03-21 RX ADMIN — BUDESONIDE 500 MCG: 0.5 SUSPENSION RESPIRATORY (INHALATION) at 08:17

## 2022-03-21 RX ADMIN — DOXYCYCLINE HYCLATE 100 MG: 100 CAPSULE ORAL at 09:22

## 2022-03-21 RX ADMIN — IPRATROPIUM BROMIDE AND ALBUTEROL SULFATE 1 AMPULE: 2.5; .5 SOLUTION RESPIRATORY (INHALATION) at 08:17

## 2022-03-21 RX ADMIN — MICONAZOLE NITRATE: 20 POWDER TOPICAL at 21:16

## 2022-03-21 RX ADMIN — INSULIN LISPRO 6 UNITS: 100 INJECTION, SOLUTION INTRAVENOUS; SUBCUTANEOUS at 17:30

## 2022-03-21 RX ADMIN — SODIUM CHLORIDE, PRESERVATIVE FREE 10 ML: 5 INJECTION INTRAVENOUS at 09:25

## 2022-03-21 ASSESSMENT — PAIN SCALES - GENERAL
PAINLEVEL_OUTOF10: 0

## 2022-03-21 NOTE — PROGRESS NOTES
[address] [place] [address] [facility]  Progress Note      Chief Complaint   Patient presents with    Cough     started a couple days ago    Hyperglycemia      per EMS    Chills    Nausea     Zofran given per EMS       Subjective:  Patient is still in the ICU. Denies any pain. Denies dyspnea. He still having some low-grade fevers. Review of systems:  A 10 point review of systems was done. As stated above, otherwise negative.     Medications:   magnesium sulfate  2,000 mg IntraVENous Once    metoprolol succinate  100 mg Oral BID    DULoxetine  30 mg Oral QAM    DULoxetine  60 mg Oral Nightly    hydrOXYzine  50 mg Oral TID    bumetanide  1 mg IntraVENous Daily    cephALEXin  1,000 mg Oral 3 times per day    doxycycline hyclate  100 mg Oral 2 times per day    naloxegol  25 mg Oral QAM    ipratropium-albuterol  1 ampule Inhalation Q6H WA    Arformoterol Tartrate  15 mcg Nebulization BID    budesonide  0.5 mg Nebulization BID    insulin glargine  30 Units SubCUTAneous BID    sodium chloride flush  5-40 mL IntraVENous 2 times per day    QUEtiapine  50 mg Oral BID    insulin lispro  0-18 Units SubCUTAneous Q4H    docusate  100 mg Per NG tube BID    senna  10 mL Per NG tube BID    enoxaparin  1 mg/kg SubCUTAneous BID    pantoprazole  40 mg IntraVENous Daily    miconazole   Topical BID      amiodarone 1 mg/min (03/21/22 0600)    sodium chloride      phenylephrine (DARLYN-SYNEPHRINE) 50mg/250mL infusion      sodium chloride      dextrose      sodium chloride Stopped (03/20/22 1503)     diazePAM, oxyCODONE, sodium chloride, metoprolol, perflutren lipid microspheres, ipratropium-albuterol, sodium chloride flush, sodium chloride, white petrolatum, fentanNYL, dextrose bolus (hypoglycemia) **OR** dextrose bolus (hypoglycemia), glucose, glucagon (rDNA), dextrose, midazolam, acetaminophen, acetaminophen    OBJECTIVE:  BP (!) 140/65   Pulse 88   Temp 99.5 °F (37.5 °C) (Bladder) Resp 27   Ht 5' 4\" (1.626 m)   Wt (!) 330 lb 3.2 oz (149.8 kg)   LMP  (LMP Unknown)   SpO2 99%   BMI 56.68 kg/m²   Temp  Av.4 °F (37.4 °C)  Min: 99 °F (37.2 °C)  Max: 100.2 °F (37.9 °C)   Constitutional: The patient is lying in bed in the ICU. she is awake and alert. Skin: Warm and dry. No rashes were noted. HEENT: Eyes show round, and reactive pupils. No jaundice. Moist mucous membranes, no ulcerations, no thrush. NG tube. Neck: Supple to movements. No lymphadenopathy. Chest: No respiratory distress. No crackles. Cardiovascular: Heart sounds rhythmic and regular. Abdomen: Positive bowel heart sounds rhythmic and regular. Sounds to auscultation. Benign to palpation. Large and pendulous. Intertrigo improved. Extremities: Bilateral lower extremity moderate to severe lymphedema. No erythema. Lines: Right PICC 3/20/2022.   Hansen catheter    Laboratory and Tests Review:  Lab Results   Component Value Date    WBC 9.4 2022    WBC 9.0 2022    WBC 11.2 2022    HGB 7.8 (L) 2022    HCT 27.2 (L) 2022    MCV 86.6 2022     2022     Lab Results   Component Value Date    NEUTROABS 7.12 2022    NEUTROABS 6.32 2022    NEUTROABS 8.35 (H) 2022     Lab Results   Component Value Date    CRP 42.1 (H) 2022    CRP 13.7 (H) 2022    CRP 5.3 (H) 2021     No results found for: CRPHS  Lab Results   Component Value Date    SEDRATE 98 (H) 2022    SEDRATE 30 (H) 2022    SEDRATE 41 (H) 2021     Lab Results   Component Value Date    ALT 23 2022    AST 20 2022    ALKPHOS 90 2022    BILITOT 0.6 2022     Lab Results   Component Value Date     2022    K 4.5 2022    K 3.6 2022    CL 96 2022    CO2 36 2022    BUN 15 2022    CREATININE 0.7 2022    CREATININE 0.7 2022    CREATININE 0.8 2022    GFRAA >60 2022    LABGLOM >60 2022 GLUCOSE 267 03/21/2022    GLUCOSE 181 12/16/2011    PROT 5.9 03/21/2022    LABALBU 2.7 03/21/2022    LABALBU 4.5 11/02/2011    CALCIUM 8.3 03/21/2022    BILITOT 0.6 03/21/2022    ALKPHOS 90 03/21/2022    AST 20 03/21/2022    ALT 23 03/21/2022     Radiology:  Reviewed    Microbiology:   Rapid SARS-CoV-2: Negative  Respiratory panel: Negative  Blood cultures 3/8/2022: Group G Streptococcus in 4 of 4 bottles  Blood cultures 3/9/2022: Negative x2  Leg wound culture 3/8/2022: Proteus, MRSA, CoNS nonhemolytic Streptococcus  Nares screen MRSA: Positive    ASSESSMENT:  · Cellulitis right lower extremity. Wound cultures grew mixed organisms, including nonhemolytic Streptococcus, Proteus, MRSA and CoNS  · Group G Streptococcus septicemia probably associated to cellulitis. This has resolved. Repeat blood cultures were negative  · Possible intra-abdominal infection. Possible cholecystitis  · Sepsis with septic shock, improved  · Acute respiratory failure   · Leukocytosis, resolved  · Elevation of transaminases. Possible cholecystitis. They seem to be trending downward. Ultrasound shows gallstones and edematous gallbladder wall  · Probable drug-induced fever. She had a pattern of nonremitting fever. It seemed to have improved after stopping Linezolid    PLAN:  · Cephalexin and Doxycycline, day 13 of 14 of appropriate antibiotic  · We will follow with you    Spoke with nursing.     Gene Anderson MD  9:29 AM  3/21/2022

## 2022-03-21 NOTE — PROGRESS NOTES
Critical Care Team - Daily Progress Note         Date and time: 3/21/2022 7:05 AM  Patient's name:  [place] [address] Record Number: [id number]  Patient's account/billing number: [de-identified]  Patient's YOB: 1955   Age: 74 y.o. Date of Admission: 3/8/2022  4:28 AM  Length of stay during current admission: 13      Primary Care Physician: Melina Farr DO  ICU Attending Physician: Dr. Mayfield Getting    Code Status: Full Code    Reason for ICU admission: Respiratory failure, shock      SUBJECTIVE:     OVERNIGHT EVENTS:    3/21: Patient converted to NSR last night. Patient remains on amiodarone drip with Lopressor for breakthrough heart rate greater than 100. Patient is also on Toprol-XL now since she can take oral medications. This morning patient did complain of significant nausea and feeling like she was about to vomit. Otherwise, patient states she is starting to feel much better and that her breathing has improved significantly. 3/20: atrial fibrillation with RVR again, amiodarone drip restarted     3/19: Patient with Afib RVR overnight, hx of Afib RVR. Denied any chest pain, alert and able to converse with staff per report. Cardiology on board and informed, placed on cardizem drip. Patient was remaining with tachycardia and ordered Amio drip. Prior to beginning amio, patient spontaneously converted to sinus rhythm. Patient denies any pain, on BiPAP. 3/18: Patient was extubated on 3/17. Patient is tolerating BiPAP well. She did have an episode overnight of agitation trying to pull off her BiPAP and nasal cannula but when she desaturated she understood that she needed to keep those on. She is still asking for water and food. 3/17: Vent day 10. Patient failed weaning trial again yesterday. Patient experienced episode of tachycardia that was found to be A. fib with RVR. Patient was cardioverted on 3/16. Patient was also started on amiodarone drip.   This morning she is in sinus rhythm. Patient was agitated overnight. Patient's agitation resolved with oxycodone administration. Patient remains on Versed, fentanyl, Precedex. She is still on Seroquel. Tube feeds are still running at this time. 3/16: Vent day 9, patient attempted weaning trial yesterday without success. Patient with agitation reported over night Remains on Versed 5 mg , Fentanyl 200 mcg Precedex 1.5 mcg. On Seroquel. Tube feeds running at 50.     3/15: Vent day #8. Patient was still agitated overnight. Patient was unable to be weaned off vent yesterday. We will likely try again today. She remains on Versed, fentanyl, Precedex. This morning she did experience a drop in her blood sugars, likely due to the decrease in steroids. Tube feeds are still running at this time. Lantus will be adjusted today. 3/14: Vent day #7. Overnight patient was extremely agitated. Patient pulled art line. Patient had to be sedated because she kept trying to pull out her ETT. She is currently on versed, fentanyl and precedex. Patient tolerated Seroquel well. 3/13: Patient did not experience any acute events overnight. She has been very agitated however and trying to pull at tube. She is still on pressors at this time. 3/12: Pressors decreased. Mentation has not been appropriate yet. Tolerating tube feeds. 3/11: Patient had no acute problems overnight. She is currently being weaned off of vaso and weaned off sedation. She has been persistently hyperglycemic. 3/10: Pressors were changed from levo to rachelle. She would awake intermittently and be agitated. Precedex was started. Patient is tolerating tube feeds as well. 3/9: Patient has converted to sinus rhythm, opening eyes with spells of anxious awakenings. Levophed stopped. Remains on Vent. 3/8: Patient presented to the ICU with hypotension and respiratory failure. Patient was started on pressors and antibiotics at that time.   Patient was also started on amiodarone drip for her A. fib. Patient was also started on insulin drip.     CURRENT VENTILATION STATUS:     [] Ventilator  [x] BIPAP  [x] Nasal Cannula [] Room Air      IF INTUBATED, ET TUBE MARKING AT LOWER LIP:  cms    SECRETIONS  Amount:  [] Small [] Moderate  [] Large  [x] None  Color:     [] White [] Colored  [] Bloody    SEDATION:  RAAS Score:  [] Propofol gtt  [] Versed gtt  [] Fentanyl gtt [] Ativan gtt   [] Precedex    PARALYZED:  [x] No    [] Yes      VASOPRESSORS:  [x] No    [] Yes    If yes -   [] Levophed       [] Dopamine     [] Vasopressin       [] Dobutamine  [] Phenylephrine         [] Epinephrine     CENTRAL LINES:     [x] No   [] Yes   ()   PICC in place in right arm 3/20      If yes -     [] Right IJ     [] Left IJ [] Right Femoral [] Left Femoral                   [] Right Subclavian [] Left Subclavian       BRAUN'S CATHETER:   [] No   [x] Yes  (Date of Insertion: )     URINE OUTPUT:            [x] Good   [] Low              [] Anuric      OBJECTIVE:     VITAL SIGNS:  BP (!) 140/65   Pulse 88   Temp 99.5 °F (37.5 °C) (Bladder)   Resp (!) 32   Ht 5' 4\" (1.626 m)   Wt (!) 330 lb 3.2 oz (149.8 kg)   LMP  (LMP Unknown)   SpO2 98%   BMI 56.68 kg/m²   Tmax over 24 hours:  Temp (24hrs), Av.5 °F (37.5 °C), Min:99 °F (37.2 °C), Max:100.2 °F (37.9 °C)      Patient Vitals for the past 6 hrs:   BP Temp Temp src Pulse Resp SpO2   22 0600 (!) 140/65 -- -- 88 (!) 32 --   22 0500 (!) 137/59 -- -- 83 28 --   22 0419 -- -- -- -- 30 --   22 0400 (!) 124/59 99.5 °F (37.5 °C) Bladder 89 (!) 33 98 %   22 0300 (!) 126/59 -- -- 91 22 --   22 0200 (!) 149/72 -- -- 101 (!) 33 --         Intake/Output Summary (Last 24 hours) at 3/21/2022 0705  Last data filed at 3/21/2022 0600  Gross per 24 hour   Intake 3426.07 ml   Output 1650 ml   Net 1776.07 ml     Wt Readings from Last 2 Encounters:   22 (!) 330 lb 3.2 oz (149.8 kg)   21 (!) 304 lb (137.9 kg)     Body mass index is 56.68 kg/m². PHYSICAL EXAMINATION:    Physical Exam  Constitutional:       Appearance: She is morbidly obese. She is ill-appearing. On BiPaP mask      Interventions: She is awake. HENT:      Head: Normocephalic and atraumatic. Eyes:      Conjunctiva/sclera: Conjunctivae normal.   Neck:      Trachea: No tracheal deviation. Cardiovascular:      Rate and Rhythm: Fast rate, irregular rhythm     Heart sounds: Normal heart sounds. Pulmonary:      Effort: Pulmonary effort is normal. BiPAP on. Breath sounds: Breath sounds present but slightly diminished bilaterally, improving  Abdominal:      General: Bowel sounds are normal.      Palpations: Abdomen is soft. Tenderness: There is no abdominal tenderness. Musculoskeletal:      Cervical back: Neck supple. Right lower leg: Edema present. Left lower leg: Edema present. Lymphadenopathy:      Cervical: No cervical adenopathy. Skin:     General: Skin is warm and dry. Findings: No rash. Comments: +bl lower extremity lymphedema chronic skin changes with right leg fissure erythma   Neurological:      General: No focal deficit present.    Psychiatric:         Behavior: Behavior normal.      Any additional physical findings: n/a    MEDICATIONS:    Scheduled Meds:   metoprolol succinate  100 mg Oral BID    DULoxetine  30 mg Oral QAM    DULoxetine  60 mg Oral Nightly    hydrOXYzine  50 mg Oral TID    bumetanide  1 mg IntraVENous Daily    cephALEXin  1,000 mg Oral 3 times per day    doxycycline hyclate  100 mg Oral 2 times per day    naloxegol  25 mg Oral QAM    ipratropium-albuterol  1 ampule Inhalation Q6H WA    Arformoterol Tartrate  15 mcg Nebulization BID    budesonide  0.5 mg Nebulization BID    insulin glargine  30 Units SubCUTAneous BID    sodium chloride flush  5-40 mL IntraVENous 2 times per day    QUEtiapine  50 mg Oral BID    insulin lispro  0-18 Units SubCUTAneous Q4H    docusate  100 mg Per NG tube BID    senna  10 mL Per NG tube BID    enoxaparin  1 mg/kg SubCUTAneous BID    pantoprazole  40 mg IntraVENous Daily    miconazole   Topical BID     Continuous Infusions:   amiodarone 1 mg/min (03/21/22 0600)    sodium chloride      phenylephrine (DARLYN-SYNEPHRINE) 50mg/250mL infusion      sodium chloride      dextrose      sodium chloride Stopped (03/20/22 1503)     PRN Meds:   diazePAM, 5 mg, Q6H PRN  oxyCODONE, 10 mg, Q6H PRN  sodium chloride, , PRN  metoprolol, 5 mg, Q6H PRN  perflutren lipid microspheres, 1.5 mL, ONCE PRN  ipratropium-albuterol, 1 ampule, Q4H PRN  sodium chloride flush, 5-40 mL, PRN  sodium chloride, 25 mL, PRN  white petrolatum, , BID PRN  fentanNYL, 25 mcg, Q2H PRN  dextrose bolus (hypoglycemia), 125 mL, PRN   Or  dextrose bolus (hypoglycemia), 250 mL, PRN  glucose, 15 g, PRN  glucagon (rDNA), 1 mg, PRN  dextrose, 100 mL/hr, PRN  midazolam, 2 mg, Q3H PRN  acetaminophen, 650 mg, Q4H PRN  acetaminophen, 650 mg, Q4H PRN          VENT SETTINGS (Comprehensive) (if applicable):  Vent Information  $Ventilation: Off Vent  Skin Assessment: Clean, dry, & intact  Equipment ID: [id number]  Vent Type: [id number]  Vent Mode: (S) AC/PC  Vt Ordered: 450 mL  Pressure Ordered: (S) 30  Rate Set: 0 bmp  Peak Flow: 0 L/min  Pressure Support: 0 cmH20  FiO2 : 35 %  SpO2: 98 %  SpO2/FiO2 ratio: 285.71  Sensitivity: 2  PEEP/CPAP: 5  I Time/ I Time %: 0.9 s  Humidification Source: Heated wire  Humidification Temp: 37  Humidification Temp Measured: 37.1  Circuit Condensation: Drained  Mask Type: Full face mask  Mask Size: Medium  Additional Respiratory  Assessments  Pulse: 88  Resp: (!) 32  SpO2: 98 %  Position: Semi-Chapin's  Humidification Source: Heated wire  Humidification Temp: 37  Circuit Condensation: Drained  Oral Care: Mouth swabbed,Lip moisturizer applied  Subglottic Suction Done?: Yes  Cuff Pressure (cm H2O): 29 cm H2O    ABGs:   Recent Labs     03/19/22  1408   PH 7.487*   PCO2 45.8*   PO2 109.5*   HCO3 33.9*   BE 9.5*   O2SAT 97.9       Laboratory findings:    Complete Blood Count:   Recent Labs     03/19/22  0538 03/19/22  0538 03/20/22  0602 03/20/22  2355 03/21/22  0515   WBC 11.2  --  9.0  --  9.4   HGB 7.9*   < > 7.2* 7.8* 7.8*   HCT 27.0*   < > 25.0* 26.1* 27.2*     --  330  --  360    < > = values in this interval not displayed. Last 3 Blood Glucose:   Recent Labs     03/19/22  0538 03/20/22  0602 03/21/22  0515   GLUCOSE 231* 187* 267*        PT/INR:    Lab Results   Component Value Date    PROTIME 19.5 04/29/2021    INR 1.8 04/29/2021     PTT:    Lab Results   Component Value Date    APTT 36.3 04/29/2021       Comprehensive Metabolic Profile:   Recent Labs     03/19/22  0538 03/19/22  0538 03/20/22  0602 03/20/22  0602 03/21/22  0515     --  143  --  138   K 3.7  --  3.6  --  4.5     --  100  --  96*   CO2 35*  --  37*  --  36*   BUN 20  --  16  --  15   CREATININE 0.8  --  0.7  --  0.7   GLUCOSE 231*  --  187*  --  267*   CALCIUM 7.9*  --  8.1*  --  8.3*   PROT 5.8*   < > 5.7*   < > 5.9*   LABALBU 2.6*   < > 2.5*   < > 2.7*   BILITOT 0.4   < > 0.4   < > 0.6   ALKPHOS 95   < > 84   < > 90   AST 23   < > 28   < > 20   ALT 34*   < > 31   < > 23    < > = values in this interval not displayed. Magnesium:   Lab Results   Component Value Date    MG 1.9 03/21/2022     Phosphorus:   Lab Results   Component Value Date    PHOS 2.6 03/21/2022     Ionized Calcium:   Lab Results   Component Value Date    CAION 1.37 10/24/2016        Urinalysis: No results found for: UA     Troponin: No results for input(s): TROPONINI in the last 72 hours.     Microbiology:   Culture, Blood 2   Date Value Ref Range Status   03/15/2022 5 Days no growth  Final       Cultures during this admission:     Blood cultures:  [] None drawn      [x] Negative             [x]  Positive (Details: 3/8 beta strep group G, repeats 3/9 negative)  Urine Culture:   [] None drawn      [x] Negative             []  Positive (Details:  )  Sputum Culture:  [x] None drawn      [] Negative             []  Positive (Details:  )   Wound Culture:  [] None drawn      [] Negative             [x]  Positive (Details: S. Aureus, Proteus sp 3/8  )     Other pertinent Labs:        Radiology/Imaging:     Chest Xray (3/21/2022): No new abnormal findings. ASSESSMENT:     Principal Problem:    Sepsis ([address]  Resolved Problems:    * No resolved hospital problems. *      PLAN:     WEAN PER PROTOCOL:  [] No   [x] Yes  [] N/A    DISCONTINUE ANY LABS:   [x] No   [] Yes    ICU PROPHYLAXIS:  Stress ulcer:  [x] PPI Agent  [] [id number] [] Sucralfate  [] Other:  VTE:   [x] Enoxaparin  [] Unfract. Heparin Subcut  [] EPC Cuffs     NUTRITION:  [x] NPO [x] Tube Feeding (Specify: ) [] TPN  [] PO (Diet: ADULT TUBE FEEDING; Nasogastric; Diabetic; Continuous; 10; Yes; 15; Q 8 hours; 50; 75; Q 4 hours  ADULT DIET; Clear Liquid)    HOME MEDICATIONS RECONCILED: [] No  [x] Yes    INSULIN DRIP:   [x] No   [] Yes    CONSULTATION NEEDED:  [x] No   [] Yes    FAMILY UPDATED:    [] No   [x] Yes    TRANSFER OUT OF ICU:   [] No   [] Yes    SYSTEMS ASSESSMENT    Neuro     Extubated on 3/17  OFF Fentanyl, Versed  Agitated at times    Agitation   Seroquel 25 mg BID  Valium 5 mg every 6 hours as needed  Oxycodone 10 mg every 6 hours as needed    Anxiety  Cymbalta 30 mg every morning  Cymbalta 60 mg nightly  Hydroxyzine 50 mg 3 times daily    Respiratory     Metabolic Alkalosis with Respiratory Acidosis  S/P 10 day intubation, Extubated 3/17  Alternating between BiPAP and 6L NC   Home O2 requirements, 6L O2 NC  Pulmicort twice daily  Brovona twice daily  DuoNeb every 4 hours as needed  Wean oxygen as tolerated.  Keep O2 sat 90-92%  Pulmonology following, appreciate input    Cardiovascular     Paroxysmal atrial fibrillation  S/p Cardioversion 3/16  Amiodarone drip, will be switched to p.o. amiodarone today  Toprol- mg twice daily  Lopressor 5 mg Q6H as needed for heart rate greater than 100  Therapeutic Lovenox  Cardiology following, appreciate input     Septic shock  Blood cultures positive Beta strep group G 3/8, repeats negative  Wound culture 3/8 Staph aureus and Proteus sp  Patient has persistent low-grade fever  IV NS 20 mL/hr  See infectious disease below    Fluid overload  Bumex 1 mg daily  Strict I's and O's  Continue to monitor     Gastrointestinal     Possible Cholecystitis  Ultrasound 3/11 gallstones and edematous gallbladder wall  Consider surgery consult    GI PPx  Protonix 40 mg IV daily    Bowel Regimen  Colace 100 mg BID  Senna daily    Renal     CKD stage III, stable  Baseline creatinine 1.1-1.2  Continue to monitor    Electrolyte abnormalities  Replete as needed  Continue to monitor     Infectious Disease     Group G Streptococcus Septicemia  Likely 2/2 to cellulitis of leg (however, cultures grew Proteus)  Blood cultures positive x2 for beta strep group G  Keflex 1 g Q8H 3 times daily (Day 4/4)  Doxycycline 100 mg twice daily (Day 4, Day 13/14 of antibiotics)  ID following, appreciate input    Fever  Tylenol suppository every 4 hours as needed for fever    Hematology/Oncology     Anticoagulation  Lovenox 135 mg twice daily      Acute on Chronic Normocytic Anemia  Baseline Hgb 9  S/p 1 U pRBC 3/20  Continue to monitor  Transfuse for Hgb <7    Left upper extremity hematoma  Lovenox held for 1 dose last night  Will likely need to be restarted since patient keeps going in atrial fibrillation  Will continue to monitor size of hematoma    Vaginal Bleeding  Suspicion for per nursing while wiping  Will exam if patient allows  Continue to monitor Hgb    Endocrine     T2DM  Lantus 30 units twice daily  HDSSI  Hypoglycemia protocol  POC glucose checks    Social/Spiritual/DNR/Other     Full code  will try to plan for possible LTAC if patient able to remain stable for > 24 hours    Sebastian Grace DO, PGY-1            3/21/2022, 7:05 AM   Critical Care Attending Addendum:    Patient seen and examined with the house staff. X-rays personally reviewed through the PACS. Family is updated at the bedside as available. Additional findings listed below as necessary. Additional comments:  1. Afib with rvr now in NSR on iv amio that will be transitioned to po.  2. LUE hematoma stable so okay to resume Lovenox for afib. 3. Chronic respiratory failure at baseline continue O2 and BIPAP. 4. LE cellulitis on cephalexin and  Doxy per ID.  5. Will cut back Seroquel and bowel regimen will be stopped.

## 2022-03-21 NOTE — PROGRESS NOTES
Pulmonary Subsequent Hospital F/U note    Patient is being followed for: acute on chronic hypoxic and hypercapnic respiratory failure     Interval HPI:  Currently lying in bed on 5L NC, SpO2 100%  She is feeling okay, calm, less confused  Remains on amiodarone infusion - currently in NSR  Breathing does not appear labored    ROS:  No dyspnea or cough  No chest pain  No vomiting  No nausea  No abdominal pain  No new rash    Exam:  BP (!) 140/65   Pulse 88   Temp 99.5 °F (37.5 °C) (Bladder)   Resp 26   Ht 5' 4\" (1.626 m)   Wt (!) 330 lb 3.2 oz (149.8 kg)   LMP  (LMP Unknown)   SpO2 100%   BMI 56.68 kg/m²    General: Patient is calm and following commands, on nasal canula  HEENT: PERRL, EOMI, MMM  Neck: supple no adenopathy  CV: RRR without murmur   Lungs: decreased BS diffusely  Abd: soft, ND, NT, bowel sounds normal  Ext: warm, extensive chronic lymphedema of the legs     Data:    Oximetry:  SpO2 Readings from Last 1 Encounters:   03/21/22 100%       Imaging personally reviewed by myself:  CXR 3/21/22  FINDINGS:   Unchanged support lines.  Stable cardiomediastinal silhouette.  No new   abnormal findings.           Impression   No new abnormal findings. Pertinent labs reviewed and noted:  Lab Results   Component Value Date    WBC 9.4 03/21/2022    HGB 7.8 03/21/2022    HCT 27.2 03/21/2022    MCV 86.6 03/21/2022    MCH 24.8 03/21/2022    MCHC 28.7 03/21/2022    RDW 16.4 03/21/2022     03/21/2022    MPV 10.4 03/21/2022     Lab Results   Component Value Date     03/21/2022    K 4.5 03/21/2022    K 3.6 03/16/2022    CL 96 03/21/2022    CO2 36 03/21/2022    BUN 15 03/21/2022    CREATININE 0.7 03/21/2022    LABALBU 2.7 03/21/2022    LABALBU 4.5 11/02/2011    CALCIUM 8.3 03/21/2022    GFRAA >60 03/21/2022    LABGLOM >60 03/21/2022     Lab Results   Component Value Date    PROTIME 19.5 04/29/2021    INR 1.8 04/29/2021       Assessment:  1.  Acute on chronic hypoxic and hypercapnic respiratory

## 2022-03-21 NOTE — PROGRESS NOTES
INPATIENT CARDIOLOGY FOLLOW-UP    Name: Denisa Mcpherson    Age: 74 y.o. Date of Admission: 3/8/2022  4:28 AM    Date of Service: 3/21/2022    Chief Complaint: Follow-up for atrial fibrillation with RVR    Interim History:  Currently off BiPAP. No chest pain or palpitations. +recurrent AF with RVR on 3/20/22 --> amiodarone drip resumed and BB switched to Toprol XL on 3/20/22 and patient converted to SR later that day --> SR on telemetry today.     Review of Systems:   Cardiac: As per HPI  General: No fever, chills  Pulmonary: As per HPI  HEENT: No visual disturbances, difficult swallowing  GI: No nausea, vomiting  : No dysuria, hematuria  Endocrine: No thyroid disease, +DM  Musculoskeletal: MEHTA x 4, no focal motor deficits  Skin: LE stasis dermatitis, no rashes  Neuro: No headache, seizures  Psych: Currently with no depression, anxiety    Problem List:  Patient Active Problem List   Diagnosis    Uncontrolled diabetes mellitus ([address]    Depression    Essential hypertension    Hyperlipidemia    Osteoarthritis    PAF (paroxysmal atrial fibrillation) (HCC) - currently in SR    Pulmonary hypertension    Chronic sinusitis    Chronic pain    Steatohepatitis    Baker's cyst of knee    Diabetic neuropathy (HCC)    Iron deficiency    LVH (left ventricular hypertrophy)    Chronic anal fissure    Positive hepatitis C antibody test    PFO (patent foramen ovale)    YEIMI (acute kidney injury) ([address]    Chronic diastolic heart failure (HCC)    Physical deconditioning    Lymphedema of both lower extremities    Chronic anemia    Dyspnea on exertion    Chronic deep vein thrombosis (DVT) of distal vein of right lower extremity (HCC)    GI bleed    Anxiety and depression    Chronic anticoagulation    COPD exacerbation (HCC)    Acute on chronic respiratory failure with hypoxia (HCC)    Blood loss anemia    Cellulitis of left lower extremity    Poorly controlled diabetes mellitus ([address]    Lymphedema    [facility]icemia ([facility]    Acute diastolic congestive heart failure (HCC)    Anemia    Morbid obesity with BMI of 45.0-49.9, adult (HCC)    Acute hypoxemic respiratory failure (HCC)    Diastolic CHF, acute on chronic (HCC)    Cellulitis    Stress fracture of right fibula    Atrial fibrillation, currently in sinus rhythm    Gastroesophageal reflux disease without esophagitis    Ulcers of both lower legs (HCC)    Chronic respiratory failure with hypoxia (HCC)    Abscess and cellulitis of gluteal region    CHF (congestive heart failure), NYHA class I, acute on chronic, combined ([facility]    Adrenal mass (HCC)    Hyperglycemia    Atrial fibrillation ([facility]    Infection due to extended-spectrum beta-lactamase-producing Escherichia coli    Bilateral cellulitis of lower leg    Ambulatory dysfunction    Chronic respiratory failure with hypercapnia (HCC)    Moderate pulmonary hypertension (HCC)    QT prolongation    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[facility]    Sepsis ([facility]       Allergies:   Allergies   Allergen Reactions    Statins Myalgia       Current Medications:  Current Facility-Administered Medications   Medication Dose Route Frequency Provider Last Rate Last Admin    ondansetron (ZOFRAN) injection 4 mg  4 mg IntraVENous Q6H PRN Particia Purpura Tofil [place], DO   4 mg at 03/21/22 1786    metoprolol succinate (TOPROL XL) extended release tablet 100 mg  100 mg Oral BID Lelo Taylor MD   100 mg at 03/20/22 2047    amiodarone (CORDARONE) 450 mg in dextrose 5 % 250 mL infusion  1 mg/min IntraVENous Continuous Lelo Taylor MD 33.3 mL/hr at 03/21/22 0600 1 mg/min at 03/21/22 0600    DULoxetine (CYMBALTA) extended release capsule 30 mg  30 mg Oral QAM Reagan M Tofil [place], DO   30 mg at 03/20/22 1208    DULoxetine (CYMBALTA) extended release capsule 60 mg  60 mg Oral Nightly Particia Purpura Tofil [place], DO   60 mg at 03/20/22 2047    hydrOXYzine (ATARAX) tablet 50 mg  50 mg Oral TID Particia Purpura Tofil [place], DO   50 mg at 03/20/22 2044  diazePAM (VALIUM) tablet 5 mg  5 mg Oral Q6H PRN Qi Dubon MD   5 mg at 03/20/22 1712    oxyCODONE (ROXICODONE) 5 MG/5ML solution 10 mg  10 mg Oral Q6H PRN Qi Dubon MD   10 mg at 03/20/22 1821    bumetanide (BUMEX) injection 1 mg  1 mg IntraVENous Daily Qi Dubon MD   1 mg at 03/20/22 1146    0.9 % sodium chloride infusion   IntraVENous PRN Frazier Burkitt Tofil Cope, DO        metoprolol (LOPRESSOR) injection 5 mg  5 mg IntraVENous Q6H PRN Frazier Burkitt Tofil Cope, DO        phenylephrine (DARLYN-SYNEPHRINE) 50 mg in dextrose 5 % 250 mL infusion   mcg/min IntraVENous Continuous Qi Dubon MD        perflutren lipid microspheres (DEFINITY) injection 1.65 mg  1.5 mL IntraVENous ONCE PRN Lolly Boyer MD        [facility] capsule 1,000 mg  1,000 mg Oral 3 times per day Rosangela Kramer MD   1,000 mg at 03/21/22 6603    doxycycline hyclate (VIBRAMYCIN) capsule 100 mg  100 mg Oral 2 times per day Rosangela Kramer MD   100 mg at 03/20/22 2045    naloxegol (MOVANTIK) tablet 25 mg  25 mg Oral QAM Qi Dubon MD   25 mg at 03/20/22 0756    ipratropium-albuterol (DUONEB) nebulizer solution 1 ampule  1 ampule Inhalation Q6H WA Qi Dubon MD   1 ampule at 03/20/22 2006    Arformoterol Tartrate (BROVANA) nebulizer solution 15 mcg  15 mcg Nebulization BID Qi Dubon MD   15 mcg at 03/20/22 2006    budesonide (PULMICORT) nebulizer suspension 500 mcg  0.5 mg Nebulization BID Qi Dubon MD   500 mcg at 03/20/22 2006    ipratropium-albuterol (DUONEB) nebulizer solution 1 ampule  1 ampule Inhalation Q4H PRN Qi Dubon MD   1 ampule at 03/16/22 1011    insulin glargine (LANTUS) injection vial 30 Units  30 Units SubCUTAneous BID Frazier Burkitt Tofil Cope, DO   30 Units at 03/20/22 2200    sodium chloride flush 0.9 % injection 5-40 mL  5-40 mL IntraVENous 2 times per day Qi Dubon MD   10 mL at 03/20/22 2045    sodium chloride flush 0.9 % injection 5-40 mL  5-40 mL IntraVENous PRN Flavia Greenfield MD   10 mL at 03/19/22 1533    0.9 % sodium chloride infusion  25 mL IntraVENous PRN Flavia Greenfield MD        QUEtiapine (SEROQUEL) tablet 50 mg  50 mg Oral BID Lashell Minjose Tofil Rochelle, DO   50 mg at 03/20/22 2045    white petrolatum ointment   Topical BID PRN Lashell King Rochelle, DO        fentaNYL (SUBLIMAZE) injection 25 mcg  25 mcg IntraVENous Q2H PRN Reynaldo Blankenship, DO   25 mcg at 03/17/22 1959    insulin lispro (HUMALOG) injection vial 0-18 Units  0-18 Units SubCUTAneous Q4H Reynaldo Blankenship, DO   9 Units at 03/21/22 0514    docusate (COLACE) 50 MG/5ML liquid 100 mg  100 mg Per NG tube BID Reynaldo Blankenship, DO   100 mg at 03/20/22 2045    senna (SENOKOT) 8.8 MG/5ML syrup 17.6 mg  10 mL Per NG tube BID Reynaldo Blankenship, DO   17.6 mg at 03/20/22 2047    enoxaparin (LOVENOX) injection 135 mg  1 mg/kg SubCUTAneous BID Reynaldo Blankenship, DO   135 mg at 03/20/22 0756    pantoprazole (PROTONIX) injection 40 mg  40 mg IntraVENous Daily Reynaldo Blankenship, DO   40 mg at 03/20/22 0758    dextrose bolus (hypoglycemia) 10% 125 mL  125 mL IntraVENous PRN Don Breech, DO        Or    dextrose bolus (hypoglycemia) 10% 250 mL  250 mL IntraVENous PRN Don Breech, DO        glucose (GLUTOSE) 40 % oral gel 15 g  15 g Oral PRN Lashell Walkeril Rochelle, DO        glucagon (rDNA) injection 1 mg  1 mg IntraMUSCular PRN Lashell Walkeril Rochelle, DO        dextrose 5 % solution  100 mL/hr IntraVENous PRN Lashell Walkeril Rochelle, DO        midazolam PF (VERSED) injection 2 mg  2 mg IntraVENous Q3H PRN Reynaldo Blankenship, DO   2 mg at 03/14/22 0350    acetaminophen (TYLENOL) tablet 650 mg  650 mg Oral Q4H PRN Aisha Gonzalez MD   650 mg at 03/20/22 0900    acetaminophen (TYLENOL) suppository 650 mg  650 mg Rectal Q4H PRN Aisha Gonzalez MD        miconazole (MICOTIN) 2 % powder   Topical BID Aisha Gonzalez MD   Given at 03/20/22 2047    0.9 % sodium chloride infusion IntraVENous Continuous Roxy Mccrary MD   Paused at 03/20/22 1503      amiodarone 1 mg/min (03/21/22 0600)    sodium chloride      phenylephrine (DARLYN-SYNEPHRINE) 50mg/250mL infusion      sodium chloride      dextrose      sodium chloride Stopped (03/20/22 1503)       Physical Exam:  BP (!) 140/65   Pulse 88   Temp 99.5 °F (37.5 °C) (Bladder)   Resp (!) 32   Ht 5' 4\" (1.626 m)   Wt (!) 330 lb 3.2 oz (149.8 kg)   LMP  (LMP Unknown)   SpO2 98%   BMI 56.68 kg/m²   Wt Readings from Last 3 Encounters:   03/14/22 (!) 330 lb 3.2 oz (149.8 kg)   08/31/21 (!) 304 lb (137.9 kg)   08/19/21 (!) 305 lb 5.4 oz (138.5 kg)     Appearance: Awake, on BiPAP  Skin: LE stasis dermatitis, no rashes  Head: Normocephalic, atraumatic  Eyes: EOMI, no conjunctival erythema  ENMT: No pharyngeal erythema, MMM, no rhinorrhea  Neck: Supple, no carotid bruits  Lungs: Decreased BS B/L, no wheezing  Cardiac: IRRR, no murmurs apparent  Abdomen: Soft, nontender, +bowel sounds  Extremities: Moves all extremities x 4, +lower extremity edema  Neurologic: No focal motor deficits apparent, awake    Intake/Output:    Intake/Output Summary (Last 24 hours) at 3/21/2022 0728  Last data filed at 3/21/2022 0600  Gross per 24 hour   Intake 3426.07 ml   Output 1650 ml   Net 1776.07 ml     No intake/output data recorded.     Laboratory Tests:  Recent Labs     03/19/22  0538 03/20/22  0602 03/21/22  0515    143 138   K 3.7 3.6 4.5    100 96*   CO2 35* 37* 36*   BUN 20 16 15   CREATININE 0.8 0.7 0.7   GLUCOSE 231* 187* 267*   CALCIUM 7.9* 8.1* 8.3*     Lab Results   Component Value Date    MG 1.9 03/21/2022     Recent Labs     03/19/22  0538 03/20/22  0602 03/21/22  0515   ALKPHOS 95 84 90   ALT 34* 31 23   AST 23 28 20   PROT 5.8* 5.7* 5.9*   BILITOT 0.4 0.4 0.6   LABALBU 2.6* 2.5* 2.7*     Recent Labs     03/19/22  0538 03/19/22  0538 03/20/22  0602 03/20/22  2355 03/21/22  0515   WBC 11.2  --  9.0  --  9.4   RBC 3.15*  --  2.89*  -- 3.14*   HGB 7.9*   < > 7.2* 7.8* 7.8*   HCT 27.0*   < > 25.0* 26.1* 27.2*   MCV 85.7  --  86.5  --  86.6   MCH 25.1*  --  24.9*  --  24.8*   MCHC 29.3*  --  28.8*  --  28.7*   RDW 16.4*  --  16.7*  --  16.4*     --  330  --  360   MPV 10.5  --  10.0  --  10.4    < > = values in this interval not displayed. Lab Results   Component Value Date    CKTOTAL 85 01/13/2021    CKMB 2.7 01/13/2021    TROPONINI 0.02 04/30/2021    TROPONINI 0.02 04/29/2021    TROPONINI <0.01 04/09/2021     No results for input(s): CKTOTAL, CKMB, CKMBINDEX, TROPHS in the last 72 hours. Lab Results   Component Value Date    INR 1.8 04/29/2021    INR 1.1 01/12/2021    INR 1.1 05/31/2020    PROTIME 19.5 (H) 04/29/2021    PROTIME 12.4 01/12/2021    PROTIME 12.4 05/31/2020     Lab Results   Component Value Date    TSH 3.160 03/19/2022     Lab Results   Component Value Date    LABA1C 7.6 (H) 03/09/2022     No results found for: EAG  Lab Results   Component Value Date    CHOL 355 (H) 01/16/2021    CHOL 287 (H) 05/26/2020    CHOL 318 (H) 11/07/2019     Lab Results   Component Value Date    TRIG 298 (H) 01/16/2021    TRIG 512 (H) 05/26/2020    TRIG 861 (H) 11/07/2019     Lab Results   Component Value Date    HDL 52 01/16/2021    HDL 33 05/26/2020    HDL 28 11/07/2019     Lab Results   Component Value Date    LDLCALC 243 (H) 01/16/2021    LDLCALC - (AA) 05/26/2020    LDLCALC - (AA) 11/07/2019     Lab Results   Component Value Date    LABVLDL 60 01/16/2021    LABVLDL - (AA) 05/26/2020    LABVLDL - (AA) 11/07/2019     No results found for: CHOLHDLRATIO  No results for input(s): PROBNP in the last 72 hours. Cardiac Tests:  Telemetry reviewed (date: 3/21/2022): SR --> recurrent AF with RVR --> SR    Echocardiogram: 4/14/21 (Dr. Ry Lopez)   Normal left ventricle size and systolic function. Ejection fraction is visually estimated at 60-65%. No regional wall motion abnormalities seen. Mild concentric left ventricular hypertrophy. Indeterminate diastolic function. The left atrium is mildly dilated. Moderately dilated right ventricle. Right ventricle global systolic function is normal . TAPSE 32 mm. Physiologic and/or trace mitral regurgitation is present. No hemodynamically significant aortic stenosis is present. Mild tricuspid regurgitation. RVSP is 69 mmHg. Pulmonary hypertension is moderate . No evidence for hemodynamically significant pericardial effusion. Compared to prior echo of 2015, changes noted. Now, there is moderate   pulmonary hypertension with moderately dilated RV. Echocardiogram: 3/18/22 (Dr. Vijay Loya)   Left ventricle is normal in size . Moderate left ventricular concentric hypertrophy noted. No regional wall motion abnormalities seen. Ejection fraction is visually estimated at 65%. Normal right ventricular size and function. The left atrium is mildly dilated. Interatrial septum appears intact. Structurally normal mitral valve. Moderate mitral annular calcification. The aortic valve appears mildly sclerotic   Mild tricuspid regurgitation. RVSP is 47 mmHg. Pulmonary hypertension is mild . ASSESSMENT / PLAN:  1. Atrial fibrillation with RVR (known PAF prior to admission) -- in and out of AF this admission, recurrent AF with RVR as of 3/20/22 AM --> SR as of 3/20/22 PM, normal TSH  2. Elevated hs-troponin (115 --> 135)  3. Acute on chronic hypoxic respiratory failure -- currently on BiPAP  4. Acute on chronic HFpEF  5. Cellulitis right lower extremity / sepsis  6. HTN  7. HLD  8. DM -- Hgb A1c 10.2 --> 7.6  9. Chronic anemia -- most recent Hgb 7.9 --> 7.2 --> PRBC's on 3/20/22 --> 7.8  10. Probable SAM  11. PHTN  12. BMI 56.7  13. PFO  14.  History of TIA    *recurrent AF with RVR on 3/20/22 --> amiodarone drip resumed and BB switched to Toprol XL on 3/20/22 and patient converted to SR later that day --> SR on telemetry today    - Continue Toprol  mg BID  - Will switch to po amiodarone (400 mg BID and will taper dose during hospitalization)  - Currently on lovenox / resume [address] once able and as H/H remains stable  - Monitor BMP and I/O's with ongoing IV diuresis  - Aggressive risk factor modifications / treatment of SAM as indicated  - Monitor CBC / PRBC's PRN  - Monitor telemetry  - Care per ID and ICU team    Greater than 35 minutes was spent counseling the patient, reviewing the rationale for the above recommendations and reviewing the patient's current medication list, problem list and results of all previously ordered testing.     Paulo Melo MD  [facility]) Cardiology

## 2022-03-21 NOTE — PLAN OF CARE
Problem: Skin Integrity - Impaired:  Goal: Will show no infection signs and symptoms  Description: Will show no infection signs and symptoms  Outcome: Met This Shift  Goal: Absence of new skin breakdown  Description: Absence of new skin breakdown  Outcome: Met This Shift     Problem: Discharge Planning:  Goal: Participates in care planning  Description: Participates in care planning  Outcome: Ongoing  Goal: Discharged to appropriate level of care  Description: Discharged to appropriate level of care  Outcome: Ongoing     Problem: Airway Clearance - Ineffective:  Goal: Ability to maintain a clear airway will improve  Description: Ability to maintain a clear airway will improve  Outcome: Ongoing     Problem: Aspiration:  Goal: Absence of aspiration  Description: Absence of aspiration  Outcome: Ongoing     Problem:  Bowel Function - Altered:  Goal: Bowel elimination is within specified parameters  Description: Bowel elimination is within specified parameters  Outcome: Ongoing     Problem: Cardiac Output - Decreased:  Goal: Hemodynamic stability will improve  Description: Hemodynamic stability will improve  Outcome: Ongoing     Problem: Fluid Volume - Imbalance:  Goal: Absence of imbalanced fluid volume signs and symptoms  Description: Absence of imbalanced fluid volume signs and symptoms  Outcome: Ongoing     Problem: Gas Exchange - Impaired:  Goal: Levels of oxygenation will improve  Description: Levels of oxygenation will improve  Outcome: Ongoing     Problem: Mental Status - Impaired:  Goal: Mental status will be restored to baseline  Description: Mental status will be restored to baseline  Outcome: Ongoing     Problem: Nutrition Deficit:  Goal: Ability to achieve adequate nutritional intake will improve  Description: Ability to achieve adequate nutritional intake will improve  Outcome: Ongoing     Problem: Pain:  Goal: Pain level will decrease  Description: Pain level will decrease  Outcome: Ongoing  Goal: Recognizes and communicates pain  Description: Recognizes and communicates pain  Outcome: Ongoing  Goal: Control of acute pain  Description: Control of acute pain  Outcome: Ongoing  Goal: Control of chronic pain  Description: Control of chronic pain  Outcome: Ongoing     Problem: Tissue Perfusion, Altered:  Goal: Circulatory function within specified parameters  Description: Circulatory function within specified parameters  Outcome: Ongoing     Problem: Tissue Perfusion - Cardiopulmonary, Altered:  Goal: Absence of angina  Description: Absence of angina  Outcome: Ongoing  Goal: Hemodynamic stability will improve  Description: Hemodynamic stability will improve  Outcome: Ongoing     Problem: Skin Integrity:  Goal: Will show no infection signs and symptoms  Description: Will show no infection signs and symptoms  Outcome: Ongoing  Goal: Absence of new skin breakdown  Description: Absence of new skin breakdown  Outcome: Ongoing     Problem: Falls - Risk of:  Goal: Will remain free from falls  Description: Will remain free from falls  Outcome: Ongoing  Goal: Absence of physical injury  Description: Absence of physical injury  Outcome: Ongoing     Problem: Pain:  Goal: Pain level will decrease  Description: Pain level will decrease  Outcome: Ongoing  Goal: Control of acute pain  Description: Control of acute pain  Outcome: Ongoing  Goal: Control of chronic pain  Description: Control of chronic pain  Outcome: Ongoing     Problem: Anxiety/Stress:  Goal: Level of anxiety will decrease  Description: Level of anxiety will decrease  Outcome: Not Met This Shift     Problem: Serum Glucose Level - Abnormal:  Goal: Ability to maintain appropriate glucose levels will improve to within specified parameters  Description: Ability to maintain appropriate glucose levels will improve to within specified parameters  Outcome: Not Met This Shift     Problem: Sleep Pattern Disturbance:  Goal: Appears well-rested  Description: Appears well-rested  Outcome: Not Met This Shift

## 2022-03-21 NOTE — PATIENT CARE CONFERENCE
Intensive Care Daily Quality Rounding Checklist        ICU Team Members: Dr. Miladis Ramirez, Franklyn Enciso, Diana (residents), charge nurse, bedside nurse, clinical pharmacist, respiratory therapist     ICU Day #: NUMBER: 14     Intubation Date: N/A     Ventilator Day #: N/A     Central Line Insertion Date: N/A                                                    Day #:  N/A      Arterial Line Insertion Date:  N/A                             Day #: N/A     Temporary Hemodialysis Catheter Insertion Date:  N/A                             Day # N/A     DVT Prophylaxis: Lovenox    GI Prophylaxis: Protonix     Hansen Catheter Insertion Date: March 8                                        Day #: 14                             Continued need (if yes, reason documented and discussed with physician): yes, need for accurate I+O's---getting Bumex     Skin Issues/ Wounds and ordered treatment discussed on rounds: yes, has wound on coccyx, SOS precautions, on bariatric low air loss bed     Goals/ Plans for the Day: Daily labs and replace electrolytes, wean oxygen as able, restart Bumex, continue critical care management, transition to oral Amiodarone per Cardiology, decrease bowel regimen

## 2022-03-21 NOTE — CARE COORDINATION
Updated plan of care. Pt weaned off vent. AVAPS prn and noctural. [facility] can accept. Possible next 24 hrs. Continue iv antibiotics.  Will follow-mjo

## 2022-03-22 VITALS
RESPIRATION RATE: 22 BRPM | HEIGHT: 64 IN | OXYGEN SATURATION: 97 % | WEIGHT: 293 LBS | TEMPERATURE: 99 F | DIASTOLIC BLOOD PRESSURE: 53 MMHG | HEART RATE: 90 BPM | BODY MASS INDEX: 50.02 KG/M2 | SYSTOLIC BLOOD PRESSURE: 116 MMHG

## 2022-03-22 LAB
ALBUMIN SERPL-MCNC: 2.6 G/DL (ref 3.5–5.2)
ALP BLD-CCNC: 81 U/L (ref 35–104)
ALT SERPL-CCNC: 24 U/L (ref 0–32)
ANION GAP SERPL CALCULATED.3IONS-SCNC: 4 MMOL/L (ref 7–16)
ANISOCYTOSIS: ABNORMAL
AST SERPL-CCNC: 18 U/L (ref 0–31)
B.E.: 13.1 MMOL/L (ref -3–3)
BASOPHILS ABSOLUTE: 0.03 E9/L (ref 0–0.2)
BASOPHILS RELATIVE PERCENT: 0.4 % (ref 0–2)
BILIRUB SERPL-MCNC: 0.5 MG/DL (ref 0–1.2)
BUN BLDV-MCNC: 13 MG/DL (ref 6–23)
CALCIUM SERPL-MCNC: 8.6 MG/DL (ref 8.6–10.2)
CHLORIDE BLD-SCNC: 94 MMOL/L (ref 98–107)
CO2: 38 MMOL/L (ref 22–29)
COHB: 2 % (ref 0–1.5)
CREAT SERPL-MCNC: 0.6 MG/DL (ref 0.5–1)
CRITICAL: ABNORMAL
DATE ANALYZED: ABNORMAL
DATE OF COLLECTION: ABNORMAL
EOSINOPHILS ABSOLUTE: 0.26 E9/L (ref 0.05–0.5)
EOSINOPHILS RELATIVE PERCENT: 3.4 % (ref 0–6)
FIO2: 35 %
GFR AFRICAN AMERICAN: >60
GFR NON-AFRICAN AMERICAN: >60 ML/MIN/1.73
GLUCOSE BLD-MCNC: 166 MG/DL (ref 74–99)
HCO3: 38.3 MMOL/L (ref 22–26)
HCT VFR BLD CALC: 26.6 % (ref 34–48)
HEMOGLOBIN: 7.6 G/DL (ref 11.5–15.5)
HHB: 2.7 % (ref 0–5)
HYPOCHROMIA: ABNORMAL
IMMATURE GRANULOCYTES #: 0.04 E9/L
IMMATURE GRANULOCYTES %: 0.5 % (ref 0–5)
LAB: ABNORMAL
LYMPHOCYTES ABSOLUTE: 1.39 E9/L (ref 1.5–4)
LYMPHOCYTES RELATIVE PERCENT: 18.3 % (ref 20–42)
Lab: ABNORMAL
MAGNESIUM: 2 MG/DL (ref 1.6–2.6)
MCH RBC QN AUTO: 25 PG (ref 26–35)
MCHC RBC AUTO-ENTMCNC: 28.6 % (ref 32–34.5)
MCV RBC AUTO: 87.5 FL (ref 80–99.9)
METER GLUCOSE: 183 MG/DL (ref 74–99)
METER GLUCOSE: 185 MG/DL (ref 74–99)
METER GLUCOSE: 197 MG/DL (ref 74–99)
METER GLUCOSE: 213 MG/DL (ref 74–99)
METHB: 0.3 % (ref 0–1.5)
MODE: ABNORMAL
MONOCYTES ABSOLUTE: 0.57 E9/L (ref 0.1–0.95)
MONOCYTES RELATIVE PERCENT: 7.5 % (ref 2–12)
NEUTROPHILS ABSOLUTE: 5.31 E9/L (ref 1.8–7.3)
NEUTROPHILS RELATIVE PERCENT: 69.9 % (ref 43–80)
O2 CONTENT: 11.5 ML/DL
O2 SATURATION: 97.2 % (ref 92–98.5)
O2HB: 95 % (ref 94–97)
OPERATOR ID: 166
PATIENT TEMP: 37 C
PCO2: 53.9 MMHG (ref 35–45)
PDW BLD-RTO: 16.2 FL (ref 11.5–15)
PEEP/CPAP: 5 CMH2O
PFO2: 2.6 MMHG/%
PH BLOOD GAS: 7.47 (ref 7.35–7.45)
PHOSPHORUS: 3 MG/DL (ref 2.5–4.5)
PLATELET # BLD: 340 E9/L (ref 130–450)
PMV BLD AUTO: 10 FL (ref 7–12)
PO2: 90.9 MMHG (ref 75–100)
POIKILOCYTES: ABNORMAL
POTASSIUM SERPL-SCNC: 4.4 MMOL/L (ref 3.5–5)
RBC # BLD: 3.04 E12/L (ref 3.5–5.5)
RR MECHANICAL: 12 B/MIN
SODIUM BLD-SCNC: 136 MMOL/L (ref 132–146)
SOURCE, BLOOD GAS: ABNORMAL
STOMATOCYTES: ABNORMAL
THB: 8.5 G/DL (ref 11.5–16.5)
TIME ANALYZED: 1122
TOTAL PROTEIN: 5.9 G/DL (ref 6.4–8.3)
VT MECHANICAL: 450 ML
WBC # BLD: 7.6 E9/L (ref 4.5–11.5)

## 2022-03-22 PROCEDURE — 2700000000 HC OXYGEN THERAPY PER DAY

## 2022-03-22 PROCEDURE — 94660 CPAP INITIATION&MGMT: CPT

## 2022-03-22 PROCEDURE — 94640 AIRWAY INHALATION TREATMENT: CPT

## 2022-03-22 PROCEDURE — 36600 WITHDRAWAL OF ARTERIAL BLOOD: CPT

## 2022-03-22 PROCEDURE — 6370000000 HC RX 637 (ALT 250 FOR IP): Performed by: INTERNAL MEDICINE

## 2022-03-22 PROCEDURE — 6370000000 HC RX 637 (ALT 250 FOR IP): Performed by: SPECIALIST

## 2022-03-22 PROCEDURE — 6370000000 HC RX 637 (ALT 250 FOR IP): Performed by: STUDENT IN AN ORGANIZED HEALTH CARE EDUCATION/TRAINING PROGRAM

## 2022-03-22 PROCEDURE — 82805 BLOOD GASES W/O2 SATURATION: CPT

## 2022-03-22 PROCEDURE — 2580000003 HC RX 258: Performed by: INTERNAL MEDICINE

## 2022-03-22 PROCEDURE — 2500000003 HC RX 250 WO HCPCS: Performed by: INTERNAL MEDICINE

## 2022-03-22 PROCEDURE — 99233 SBSQ HOSP IP/OBS HIGH 50: CPT | Performed by: INTERNAL MEDICINE

## 2022-03-22 PROCEDURE — 84100 ASSAY OF PHOSPHORUS: CPT

## 2022-03-22 PROCEDURE — 6360000002 HC RX W HCPCS: Performed by: INTERNAL MEDICINE

## 2022-03-22 PROCEDURE — 83735 ASSAY OF MAGNESIUM: CPT

## 2022-03-22 PROCEDURE — 85025 COMPLETE CBC W/AUTO DIFF WBC: CPT

## 2022-03-22 PROCEDURE — 36592 COLLECT BLOOD FROM PICC: CPT

## 2022-03-22 PROCEDURE — 80053 COMPREHEN METABOLIC PANEL: CPT

## 2022-03-22 PROCEDURE — 36415 COLL VENOUS BLD VENIPUNCTURE: CPT

## 2022-03-22 PROCEDURE — 82962 GLUCOSE BLOOD TEST: CPT

## 2022-03-22 RX ORDER — AMIODARONE HYDROCHLORIDE 200 MG/1
TABLET ORAL
Status: DISCONTINUED
Start: 2022-03-22 | End: 2022-03-22 | Stop reason: HOSPADM

## 2022-03-22 RX ORDER — QUETIAPINE FUMARATE 25 MG/1
25 TABLET, FILM COATED ORAL 2 TIMES DAILY
Qty: 60 TABLET | Refills: 3 | Status: ON HOLD | OUTPATIENT
Start: 2022-03-22 | End: 2022-10-04

## 2022-03-22 RX ORDER — QUETIAPINE FUMARATE 25 MG/1
25 TABLET, FILM COATED ORAL NIGHTLY
Status: DISCONTINUED | OUTPATIENT
Start: 2022-03-24 | End: 2022-03-22 | Stop reason: HOSPADM

## 2022-03-22 RX ORDER — INSULIN GLARGINE 100 [IU]/ML
30 INJECTION, SOLUTION SUBCUTANEOUS 2 TIMES DAILY
Qty: 10 ML | Refills: 3 | Status: SHIPPED | OUTPATIENT
Start: 2022-03-22

## 2022-03-22 RX ORDER — AMIODARONE HYDROCHLORIDE 200 MG/1
200 TABLET ORAL DAILY
Status: DISCONTINUED | OUTPATIENT
Start: 2022-03-29 | End: 2022-03-22 | Stop reason: HOSPADM

## 2022-03-22 RX ADMIN — INSULIN LISPRO 3 UNITS: 100 INJECTION, SOLUTION INTRAVENOUS; SUBCUTANEOUS at 12:26

## 2022-03-22 RX ADMIN — DIAZEPAM 5 MG: 5 TABLET ORAL at 00:22

## 2022-03-22 RX ADMIN — INSULIN GLARGINE 30 UNITS: 100 INJECTION, SOLUTION SUBCUTANEOUS at 09:53

## 2022-03-22 RX ADMIN — INSULIN LISPRO 3 UNITS: 100 INJECTION, SOLUTION INTRAVENOUS; SUBCUTANEOUS at 09:53

## 2022-03-22 RX ADMIN — MICONAZOLE NITRATE: 20 POWDER TOPICAL at 09:49

## 2022-03-22 RX ADMIN — METOPROLOL SUCCINATE 100 MG: 100 TABLET, EXTENDED RELEASE ORAL at 09:46

## 2022-03-22 RX ADMIN — HYDROXYZINE HYDROCHLORIDE 50 MG: 10 TABLET ORAL at 13:31

## 2022-03-22 RX ADMIN — ARFORMOTEROL TARTRATE 15 MCG: 15 SOLUTION RESPIRATORY (INHALATION) at 08:31

## 2022-03-22 RX ADMIN — ENOXAPARIN SODIUM 150 MG: 150 INJECTION SUBCUTANEOUS at 09:47

## 2022-03-22 RX ADMIN — IPRATROPIUM BROMIDE AND ALBUTEROL SULFATE 1 AMPULE: 2.5; .5 SOLUTION RESPIRATORY (INHALATION) at 12:42

## 2022-03-22 RX ADMIN — INSULIN LISPRO 6 UNITS: 100 INJECTION, SOLUTION INTRAVENOUS; SUBCUTANEOUS at 00:24

## 2022-03-22 RX ADMIN — AMIODARONE HYDROCHLORIDE 400 MG: 200 TABLET ORAL at 09:46

## 2022-03-22 RX ADMIN — SODIUM CHLORIDE, PRESERVATIVE FREE 10 ML: 5 INJECTION INTRAVENOUS at 09:48

## 2022-03-22 RX ADMIN — BUDESONIDE 500 MCG: 0.5 SUSPENSION RESPIRATORY (INHALATION) at 08:31

## 2022-03-22 RX ADMIN — BUMETANIDE 1 MG: 0.25 INJECTION INTRAMUSCULAR; INTRAVENOUS at 09:47

## 2022-03-22 RX ADMIN — ACETAMINOPHEN 650 MG: 325 TABLET ORAL at 02:28

## 2022-03-22 RX ADMIN — CEPHALEXIN 1000 MG: 500 CAPSULE ORAL at 06:27

## 2022-03-22 RX ADMIN — PANTOPRAZOLE SODIUM 40 MG: 40 TABLET, DELAYED RELEASE ORAL at 06:27

## 2022-03-22 RX ADMIN — INSULIN LISPRO 3 UNITS: 100 INJECTION, SOLUTION INTRAVENOUS; SUBCUTANEOUS at 04:25

## 2022-03-22 RX ADMIN — IPRATROPIUM BROMIDE AND ALBUTEROL SULFATE 1 AMPULE: 2.5; .5 SOLUTION RESPIRATORY (INHALATION) at 08:31

## 2022-03-22 RX ADMIN — QUETIAPINE FUMARATE 25 MG: 25 TABLET ORAL at 09:46

## 2022-03-22 RX ADMIN — DULOXETINE HYDROCHLORIDE 30 MG: 30 CAPSULE, DELAYED RELEASE ORAL at 11:04

## 2022-03-22 RX ADMIN — DOXYCYCLINE HYCLATE 100 MG: 100 CAPSULE ORAL at 09:46

## 2022-03-22 RX ADMIN — HYDROXYZINE HYDROCHLORIDE 50 MG: 10 TABLET ORAL at 11:05

## 2022-03-22 ASSESSMENT — PAIN SCALES - GENERAL
PAINLEVEL_OUTOF10: 2
PAINLEVEL_OUTOF10: 0

## 2022-03-22 ASSESSMENT — PAIN DESCRIPTION - DESCRIPTORS: DESCRIPTORS: DISCOMFORT

## 2022-03-22 ASSESSMENT — PAIN DESCRIPTION - PAIN TYPE: TYPE: ACUTE PAIN

## 2022-03-22 ASSESSMENT — PAIN DESCRIPTION - ORIENTATION: ORIENTATION: RIGHT

## 2022-03-22 ASSESSMENT — PAIN DESCRIPTION - LOCATION: LOCATION: LEG

## 2022-03-22 NOTE — PROGRESS NOTES
INPATIENT CARDIOLOGY FOLLOW-UP    Name: Johny Duverney    Age: 74 y.o. Date of Admission: 3/8/2022  4:28 AM    Date of Service: 3/22/2022    Chief Complaint: Follow-up for atrial fibrillation with RVR    Interim History:  Currently on BiPAP. No chest pain or palpitations. +recurrent AF with RVR on 3/20/22 --> amiodarone drip resumed and BB switched to Toprol XL on 3/20/22 and patient converted to SR later that day --> she has remained in SR.     Review of Systems:   Cardiac: As per HPI  General: No fever, chills  Pulmonary: As per HPI  HEENT: No visual disturbances, difficult swallowing  GI: No nausea, vomiting  : No dysuria, hematuria  Endocrine: No thyroid disease, +DM  Musculoskeletal: MEHTA x 4, no focal motor deficits  Skin: LE stasis dermatitis, no rashes  Neuro: No headache, seizures  Psych: Currently with no depression, anxiety    Problem List:  Patient Active Problem List   Diagnosis    Uncontrolled diabetes mellitus ([address]    Depression    Essential hypertension    Hyperlipidemia    Osteoarthritis    PAF (paroxysmal atrial fibrillation) (HCC) - currently in SR    Pulmonary hypertension    Chronic sinusitis    Chronic pain    Steatohepatitis    Baker's cyst of knee    Diabetic neuropathy (HCC)    Iron deficiency    LVH (left ventricular hypertrophy)    Chronic anal fissure    Positive hepatitis C antibody test    PFO (patent foramen ovale)    YEIMI (acute kidney injury) ([address]    Chronic diastolic heart failure (HCC)    Physical deconditioning    Lymphedema of both lower extremities    Chronic anemia    Dyspnea on exertion    Chronic deep vein thrombosis (DVT) of distal vein of right lower extremity (HCC)    GI bleed    Anxiety and depression    Chronic anticoagulation    COPD exacerbation (HCC)    Acute on chronic respiratory failure with hypoxia (HCC)    Blood loss anemia    Cellulitis of left lower extremity    Poorly controlled diabetes mellitus ([address]    Lymphedema    Septicemia ([facility]    Acute diastolic congestive heart failure (HCC)    Anemia    Morbid obesity with BMI of 45.0-49.9, adult (HCC)    Acute hypoxemic respiratory failure (HCC)    Diastolic CHF, acute on chronic (HCC)    Cellulitis    Stress fracture of right fibula    Atrial fibrillation, currently in sinus rhythm    Gastroesophageal reflux disease without esophagitis    Ulcers of both lower legs (HCC)    Chronic respiratory failure with hypoxia (HCC)    Abscess and cellulitis of gluteal region    CHF (congestive heart failure), NYHA class I, acute on chronic, combined ([facility]    Adrenal mass (HCC)    Hyperglycemia    Atrial fibrillation ([facility]    Infection due to extended-spectrum beta-lactamase-producing Escherichia coli    Bilateral cellulitis of lower leg    Ambulatory dysfunction    Chronic respiratory failure with hypercapnia (HCC)    Moderate pulmonary hypertension (HCC)    QT prolongation    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[facility]    Sepsis ([facility]       Allergies:   Allergies   Allergen Reactions    Statins Myalgia       Current Medications:  Current Facility-Administered Medications   Medication Dose Route Frequency Provider Last Rate Last Admin    amiodarone (CORDARONE) 200 MG tablet             [START ON 3/24/2022] QUEtiapine (SEROQUEL) tablet 25 mg  25 mg Oral Nightly Vandana Lauren MD        pantoprazole (PROTONIX) tablet 40 mg  40 mg Oral QAM AC Vandana Lauren MD   40 mg at 03/22/22 6444    enoxaparin (LOVENOX) injection 150 mg  1 mg/kg SubCUTAneous BID Vandana Lauren MD   150 mg at 03/22/22 0947    acetaminophen (TYLENOL) 160 MG/5ML solution 650 mg  650 mg Oral Q4H PRN Vandana Lauren MD        QUEtiapine (SEROQUEL) tablet 25 mg  25 mg Oral BID Vandana Lauren MD   25 mg at 03/22/22 0946    amiodarone (CORDARONE) tablet 400 mg  400 mg Oral BID Lilli Culp MD   400 mg at 03/22/22 0946    metoprolol succinate (TOPROL XL) extended release tablet 100 mg  100 mg Oral BID Melissa Stone MD   100 mg at 03/22/22 0946    DULoxetine (CYMBALTA) extended release capsule 30 mg  30 mg Oral QAM [place] M Tofil Rocklin, DO   30 mg at 03/22/22 1104    DULoxetine (CYMBALTA) extended release capsule 60 mg  60 mg Oral Nightly [place] VALARIE Tofil [place]lin, DO   60 mg at 03/21/22 2016    hydrOXYzine (ATARAX) tablet 50 mg  50 mg Oral TID Frazier Burkitt Tofil Rocklin, DO   50 mg at 03/22/22 1105    diazePAM (VALIUM) tablet 5 mg  5 mg Oral Q6H PRN Qi Dubon MD   5 mg at 03/22/22 0022    oxyCODONE (ROXICODONE) 5 MG/5ML solution 10 mg  10 mg Oral Q6H PRN Qi Dubon MD   10 mg at 03/20/22 1821    bumetanide (BUMEX) injection 1 mg  1 mg IntraVENous Daily Qi Dubon MD   1 mg at 03/22/22 0947    metoprolol (LOPRESSOR) injection 5 mg  5 mg IntraVENous Q6H PRN Frazier Burkitt Tofil Rocklin, DO        perflutren lipid microspheres (DEFINITY) injection 1.65 mg  1.5 mL IntraVENous ONCE PRN Lolly Boyer MD        ipratropium-albuterol (DUONEB) nebulizer solution 1 ampule  1 ampule Inhalation Q6H WA Qi Dubon MD   1 ampule at 03/22/22 0831    Arformoterol Tartrate (BROVANA) nebulizer solution 15 mcg  15 mcg Nebulization BID Qi Dubon MD   15 mcg at 03/22/22 0831    budesonide (PULMICORT) nebulizer suspension 500 mcg  0.5 mg Nebulization BID Qi Dubon MD   500 mcg at 03/22/22 0831    ipratropium-albuterol (DUONEB) nebulizer solution 1 ampule  1 ampule Inhalation Q4H PRN Qi Dubon MD   1 ampule at 03/16/22 1011    insulin glargine (LANTUS) injection vial 30 Units  30 Units SubCUTAneous BID Frazier Burkitt Tofil Rocklin, DO   30 Units at 03/22/22 1627    sodium chloride flush 0.9 % injection 5-40 mL  5-40 mL IntraVENous 2 times per day Qi Dubon MD   10 mL at 03/22/22 0948    sodium chloride flush 0.9 % injection 5-40 mL  5-40 mL IntraVENous PRN Qi Dubon MD   10 mL at 03/19/22 1533    0.9 % sodium chloride infusion  25 mL IntraVENous PRN Holly Martin MD [place]        white petrolatum ointment   Topical BID PRN Daniela Rummer Tofil [place], DO        fentaNYL (SUBLIMAZE) injection 25 mcg  25 mcg IntraVENous Q2H PRN Krystina Blankenship, DO   25 mcg at 03/17/22 1959    insulin lispro (HUMALOG) injection vial 0-18 Units  0-18 Units SubCUTAneous Q4H Elsie Aas, DO   3 Units at 03/22/22 7199    dextrose bolus (hypoglycemia) 10% 125 mL  125 mL IntraVENous PRN Regenia Andrew, DO        Or    dextrose bolus (hypoglycemia) 10% 250 mL  250 mL IntraVENous PRN Regenia Andrew, DO        glucose (GLUTOSE) 40 % oral gel 15 g  15 g Oral PRN Daniela Amaromer Tofil [place], DO        glucagon (rDNA) injection 1 mg  1 mg IntraMUSCular PRN [place] M Tofil [place], DO        dextrose 5 % solution  100 mL/hr IntraVENous PRN Daniela Amaromer Tofil [place], DO        midazolam PF (VERSED) injection 2 mg  2 mg IntraVENous Q3H PRN Elsie Aas, DO   2 mg at 03/14/22 0350    acetaminophen (TYLENOL) tablet 650 mg  650 mg Oral Q4H PRN Magy Eason MD   650 mg at 03/22/22 2367    acetaminophen (TYLENOL) suppository 650 mg  650 mg Rectal Q4H PRN Magy Eason MD        miconazole (MICOTIN) 2 % powder   Topical BID Magy Eason MD   Given at 03/22/22 0949    0.9 % sodium chloride infusion   IntraVENous Continuous Magy Eason MD   Paused at 03/20/22 1503      sodium chloride      dextrose      sodium chloride Stopped (03/20/22 1503)       Physical Exam:  BP (!) 116/53   Pulse 87   Temp 99 °F (37.2 °C) (Bladder)   Resp 22   Ht 5' 4\" (1.626 m)   Wt (!) 330 lb 3.2 oz (149.8 kg)   LMP  (LMP Unknown)   SpO2 94%   BMI 56.68 kg/m²   Wt Readings from Last 3 Encounters:   03/14/22 (!) 330 lb 3.2 oz (149.8 kg)   08/31/21 (!) 304 lb (137.9 kg)   08/19/21 (!) 305 lb 5.4 oz (138.5 kg)     Appearance: Awake, on BiPAP  Skin: LE stasis dermatitis, no rashes  Head: Normocephalic, atraumatic  Eyes: EOMI, no conjunctival erythema  ENMT: No pharyngeal erythema, MMM, no rhinorrhea  Neck: Supple, no carotid bruits  Lungs: Decreased BS B/L, no wheezing  Cardiac: IRRR, no murmurs apparent  Abdomen: Soft, nontender, +bowel sounds  Extremities: Moves all extremities x 4, +lower extremity edema  Neurologic: No focal motor deficits apparent, awake    Intake/Output:    Intake/Output Summary (Last 24 hours) at 3/22/2022 1207  Last data filed at 3/22/2022 1104  Gross per 24 hour   Intake 1624.21 ml   Output 2825 ml   Net -1200.79 ml     I/O this shift:  In: -   Out: 425 [Urine:425]    Laboratory Tests:  Recent Labs     03/20/22  0602 03/21/22  0515 03/22/22  0500    138 136   K 3.6 4.5 4.4    96* 94*   CO2 37* 36* 38*   BUN 16 15 13   CREATININE 0.7 0.7 0.6   GLUCOSE 187* 267* 166*   CALCIUM 8.1* 8.3* 8.6     Lab Results   Component Value Date    MG 2.0 03/22/2022     Recent Labs     03/20/22  0602 03/21/22  0515 03/22/22  0500   ALKPHOS 84 90 81   ALT 31 23 24   AST 28 20 18   PROT 5.7* 5.9* 5.9*   BILITOT 0.4 0.6 0.5   LABALBU 2.5* 2.7* 2.6*     Recent Labs     03/20/22  0602 03/20/22  0602 03/20/22  2355 03/21/22  0515 03/22/22  0500   WBC 9.0  --   --  9.4 7.6   RBC 2.89*  --   --  3.14* 3.04*   HGB 7.2*   < > 7.8* 7.8* 7.6*   HCT 25.0*   < > 26.1* 27.2* 26.6*   MCV 86.5  --   --  86.6 87.5   MCH 24.9*  --   --  24.8* 25.0*   MCHC 28.8*  --   --  28.7* 28.6*   RDW 16.7*  --   --  16.4* 16.2*     --   --  360 340   MPV 10.0  --   --  10.4 10.0    < > = values in this interval not displayed. Lab Results   Component Value Date    CKTOTAL 85 01/13/2021    CKMB 2.7 01/13/2021    TROPONINI 0.02 04/30/2021    TROPONINI 0.02 04/29/2021    TROPONINI <0.01 04/09/2021     No results for input(s): CKTOTAL, CKMB, CKMBINDEX, TROPHS in the last 72 hours.   Lab Results   Component Value Date    INR 1.8 04/29/2021    INR 1.1 01/12/2021    INR 1.1 05/31/2020    PROTIME 19.5 (H) 04/29/2021    PROTIME 12.4 01/12/2021    PROTIME 12.4 05/31/2020     Lab Results   Component Value Date    TSH 3.160 03/19/2022     Lab Results Component Value Date    LABA1C 7.6 (H) 03/09/2022     No results found for: EAG  Lab Results   Component Value Date    CHOL 355 (H) 01/16/2021    CHOL 287 (H) 05/26/2020    CHOL 318 (H) 11/07/2019     Lab Results   Component Value Date    TRIG 298 (H) 01/16/2021    TRIG 512 (H) 05/26/2020    TRIG 861 (H) 11/07/2019     Lab Results   Component Value Date    HDL 52 01/16/2021    HDL 33 05/26/2020    HDL 28 11/07/2019     Lab Results   Component Value Date    LDLCALC 243 (H) 01/16/2021    LDLCALC - (AA) 05/26/2020    LDLCALC - (AA) 11/07/2019     Lab Results   Component Value Date    LABVLDL 60 01/16/2021    LABVLDL - (AA) 05/26/2020    LABVLDL - (AA) 11/07/2019     No results found for: CHOLHDLRATIO  No results for input(s): PROBNP in the last 72 hours. Cardiac Tests:  Telemetry reviewed (date: 3/22/2022): SR, rate 90's    Echocardiogram: 4/14/21 (Dr. Lina Nolan)   Normal left ventricle size and systolic function. Ejection fraction is visually estimated at 60-65%. No regional wall motion abnormalities seen. Mild concentric left ventricular hypertrophy. Indeterminate diastolic function. The left atrium is mildly dilated. Moderately dilated right ventricle. Right ventricle global systolic function is normal . TAPSE 32 mm. Physiologic and/or trace mitral regurgitation is present. No hemodynamically significant aortic stenosis is present. Mild tricuspid regurgitation. RVSP is 69 mmHg. Pulmonary hypertension is moderate . No evidence for hemodynamically significant pericardial effusion. Compared to prior echo of 2015, changes noted. Now, there is moderate   pulmonary hypertension with moderately dilated RV. Echocardiogram: 3/18/22 (Dr. Radha Holguin)   Left ventricle is normal in size . Moderate left ventricular concentric hypertrophy noted. No regional wall motion abnormalities seen. Ejection fraction is visually estimated at 65%. Normal right ventricular size and function.    The left atrium is mildly dilated. Interatrial septum appears intact. Structurally normal mitral valve. Moderate mitral annular calcification. The aortic valve appears mildly sclerotic   Mild tricuspid regurgitation. RVSP is 47 mmHg. Pulmonary hypertension is mild . ASSESSMENT / PLAN:  1. Atrial fibrillation with RVR (known PAF prior to admission) -- in and out of AF this admission, recurrent AF with RVR as of 3/20/22 AM --> SR as of 3/20/22 PM, normal TSH  2. Elevated hs-troponin (115 --> 135)  3. Acute on chronic hypoxic respiratory failure -- currently on BiPAP  4. Acute on chronic HFpEF  5. Cellulitis right lower extremity / sepsis  6. HTN  7. HLD  8. DM -- Hgb A1c 10.2 --> 7.6  9. Chronic anemia -- most recent Hgb 7.9 --> 7.2 --> PRBC's on 3/20/22 --> 7.8 --> 7.6  10. Probable SAM  11. PHTN  12. BMI 56.7  13. PFO  14. History of TIA    *recurrent AF with RVR on 3/20/22 --> amiodarone drip resumed and BB switched to Toprol XL on 3/20/22 and patient converted to SR later that day --> SR on telemetry today    - Continue Toprol  mg BID  - Switched to po amiodarone on 3/21/22 (400 mg BID) -- will write for taper dose in Epic (switch to 200 mg daily on 3/29/22)  - Currently on lovenox / resume 4  once able and as H/H remains stable  - Monitor BMP and I/O's with ongoing IV diuresis  - Aggressive risk factor modifications / treatment of SAM as indicated  - Monitor CBC / PRBC's PRN  - Monitor telemetry  - Care per ID and ICU team    Greater than 35 minutes was spent counseling the patient, reviewing the rationale for the above recommendations and reviewing the patient's current medication list, problem list and results of all previously ordered testing.     Farrah Magdaleno MD  [facility]) Cardiology

## 2022-03-22 NOTE — PLAN OF CARE
Problem: Pain:  Goal: Pain level will decrease  Description: Pain level will decrease  3/22/2022 0008 by Stefanie Gonsalves RN  Outcome: Met This Shift

## 2022-03-22 NOTE — CARE COORDINATION
Pt to discharge transfer to [facility] at 2 pm today. Staff and family aware.  [facility] [place]

## 2022-03-22 NOTE — PROGRESS NOTES
Date: 3/22/2022    Time: 3:52 AM    Patient Placed On BIPAP/CPAP/ Non-Invasive Ventilation? No    If no must comment. Facial area red/color change? No           If YES are Blister/Lesion present? No   If yes must notify nursing staff  BIPAP/CPAP skin barrier? Yes    Skin barrier type:mepilexlite       Comments: Pt remains on AVAPS at this time. Machine plugged into red out and outside alarm plugged in.         Gonzalez Roblero RCP      03/22/22 0351   NIV Type   Mode AVAPS   Mask Type Full face mask   Mask Size Medium   Settings/Measurements   CPAP/EPAP 5 cmH2O   IPAP Min 14 cmH2O   IPAP Max 30 cmH2O   Vt Ordered 450 mL   Rate Ordered 12   Resp 23   FiO2  35 %   Vt Exhaled 454 mL   Minute Volume 10.3 Liters   Mask Leak (lpm) 43 lpm   Comfort Level Good   Using Accessory Muscles No   SpO2 97

## 2022-03-22 NOTE — DISCHARGE INSTR - COC
Continuity of Care Form    Patient Name: Magdaleno Robles   :  1955  MRN:  [id number]    Admit date:  3/8/2022  Discharge date:  3/22/22    Code Status Order: Full Code   Advance Directives:      Admitting Physician:  Jaren Galeano MD  PCP: Isaiah Chew DO    Discharging Nurse: [facility] Unit/Room#: 0217/0217-A  Discharging Unit Phone Number: [phone]    Emergency Contact:   Extended Emergency Contact Information  Primary Emergency Contact: Jeramie Mccollum [address] Phone: [phone]  Mobile Phone: [phone]  Relation: Child   needed? No  Secondary Emergency Contact: Gem Betancur  [place] Phone: [phone]  Mobile Phone: [phone]  Relation: Other  Preferred language: English   needed? No    Past Surgical History:  Past Surgical History:   Procedure Laterality Date    ANOSCOPY  10/25/2006    injection of anal sphincter with Botox, Franklyn Ortiz/Timmy, [facility]    ANOSCOPY  2007    injection of anal sphincter with Botox, Dr. Silvia Perez, [facility]    ANOSCOPY  2007    injection of anal sphincter with Botox, Franklyn Banks [facility]    ANUS SURGERY  2006    Lateral sphincterotomy for chronic anal fissure, Dr. Evelyn Billy, [facility]  2012    anal exam and injection of Botox 100 units into anal sphincter, Laila Banks, [facility]    COLONOSCOPY  2004    cecal polyp, bx (no pathologic changes), Dr. Luz Meza, [facility]    COLONOSCOPY  2006    snare polypectomy terminal ileum polyp (granulation tissue polyp) and anal polyp (inflammatory/papilla), Dr. Silvia Perez, [facility]    COLONOSCOPY  3/23/2012    bx/cauterization proximal ascending colon polyp, injection of anal sphincter with Botox, Dr. Sivlia Perez, [facility]    ENDOSCOPY, COLON, DIAGNOSTIC      FRACTURE SURGERY      R leg fracture with surgery for repair    INSERT MIDLINE CATHETER  3/18/2022         JOINT REPLACEMENT  2003    L knee    PICC LINE INSERTION NURSE  3/20/2022         DE DEBRIDEMENT, SKIN, SUB-Q TISSUE,MUSCLE,=<20 SQ CM Left 11/30/2018    LEFT LEG EXCISIONAL  DEBRIDEMENT WITH TISSUE BIOPSY performed by Yomaira Capps DPM at [address]  1/20/2004    [facility], [facility] (no H pylori[facility]/A 6/1/2018    EGD BIOPSY performed by Yas Kern MD at [address] N/A 11/9/2018    EGD BIOPSY performed by Yas Kern MD at [address]       Immunization History:   Immunization History   Administered Date(s) Administered    Influenza 10/26/2012    Influenza Virus Vaccine 10/08/2013, 09/04/2015    Influenza, High Dose (Fluzone 65 yrs and older) 11/07/2019    Influenza, Quadv, IM, PF (6 mo and older Fluzone, Flulaval, Fluarix, and 3 yrs and older Afluria) 10/24/2016, 12/14/2017    Pneumococcal Polysaccharide (Kxzrilexa47) 09/04/2015    Zoster Live (Zostavax) 01/02/2015       Active Problems:  Patient Active Problem List   Diagnosis Code    Uncontrolled diabetes mellitus ([facility] E11.65    Depression F32. 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 A    Chronic anticoagulation Z79.01    COPD exacerbation ([facility]) J44.1    Acute on chronic respiratory failure with hypoxia ([facility]) J96.21    Blood loss anemia D50.0    Cellulitis of left lower extremity L03.116    Poorly controlled diabetes mellitus ([facility]) E11.65    Lymphedema I89.0    Septicemia ([facility]) Z33.3    Acute diastolic congestive heart failure ([facility]) I50.31    Anemia D64.9    Morbid obesity with BMI of 45.0-49.9, adult ([facility]) E66.01, Z68.42    Acute hypoxemic respiratory failure ([facility]) E32.43    Diastolic CHF, acute on chronic ([facility]) I50.33    Cellulitis L03.90    Stress fracture of right fibula M84.363A    Atrial fibrillation, currently in sinus rhythm Z86.79    Gastroesophageal reflux disease without esophagitis K21.9    Ulcers of both lower legs ([facility]) L97.919, L97.929    Chronic respiratory failure with hypoxia ([facility]) J96.11    Abscess and cellulitis of gluteal region L02.31, L03.317    CHF (congestive heart failure), NYHA class I, acute on chronic, combined ([facility]) I50.43    Adrenal mass ([facility]) E27.8    Hyperglycemia R73.9    Atrial fibrillation ([facility]) I48.91    Infection due to extended-spectrum beta-lactamase-producing Escherichia coli A49.8, Z16.12    Bilateral cellulitis of lower leg L03.116, L03.115    Ambulatory dysfunction R26.2    Chronic respiratory failure with hypercapnia ([facility]) J96.12    Moderate pulmonary hypertension ([facility]) I27.20    QT prolongation R94.31    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    Sepsis ([facility]) A41.9       Isolation/Infection:   Isolation            No Isolation          Patient Infection Status       Infection Onset Added Last Indicated Last Indicated By Review Planned Expiration Resolved Resolved By    MRSA 03/08/22 03/10/22 03/08/22 Culture, MRSA, Screening        MRSA nares 3/8/2022  MRSA wound right leg 3/8/2022    Resolved    COVID-19 (Rule Out) 03/20/22 03/20/22 03/20/22 Respiratory Panel, Molecular, with COVID-19 (Restricted: peds pts or suitable admitted adults) (Ordered)   03/20/22 Rule-Out Test Resulted    COVID-19 (Rule Out) 03/08/22 03/08/22 03/08/22 Respiratory Panel, Molecular, with COVID-19 (Restricted: peds pts or suitable admitted adults) (Ordered)   03/08/22 Rule-Out Test Resulted    COVID-19 (Rule Out) 03/08/22 03/08/22 03/08/22 COVID-19, Rapid (Ordered)   03/08/22 Rule-Out Test Resulted    COVID-19 (Rule Out) 08/18/21 08/18/21 08/18/21 Respiratory Panel, Molecular, with COVID-19 (Restricted: peds pts or suitable admitted adults) (Ordered)   08/18/21 Rule-Out Test Resulted    MRSA  07/12/21 07/12/21 Sondra Steward RN   03/08/22 Latoya Helm RN    Wound-Right Leg skin 7/5/21. Contact isolation per Dr. Stormy Osgood. MRSA 07/05/21 07/10/21 07/05/21 Culture, Wound   07/12/21 Sondra Steward RN    Wound-Right Leg skin 7/5/21. Contained within C/D/I dressing. Standard precautions indicated. COVID-19 (Rule Out) 05/03/21 05/03/21 05/03/21 COVID-19, Rapid (Ordered)   05/03/21 Rule-Out Test Resulted    ESBL (Extended Spectrum Beta Lactamase) 04/29/21 05/02/21 04/29/21 Culture, Blood 2   07/07/21 Marciana Dakin, RN    Ecoli blood 4/29/21    COVID-19 (Rule Out) 04/29/21 04/29/21 04/29/21 COVID-19, Rapid (Ordered)   04/29/21 Rule-Out Test Resulted    COVID-19 (Rule Out) 01/13/21 01/13/21 01/13/21 Respiratory Panel, Molecular, with COVID-19 (Restricted: peds pts or suitable admitted adults) (Ordered)   01/14/21 Sondra Steward RN    COVID-19 (Rule Out) 01/12/21 01/12/21 01/12/21 COVID-19 (Ordered)   01/12/21 Rule-Out Test Resulted    COVID-19 (Rule Out) 06/02/20 06/02/20 06/02/20 COVID-19 (Ordered)   06/02/20 Rule-Out Test Resulted    COVID-19 (Rule Out) 05/29/20 05/29/20 05/29/20 COVID-19 (Ordered)   05/29/20 Rule-Out Test Resulted    ESBL (Extended Spectrum Beta Lactamase)  12/04/18 12/04/18 Latoya Helm RN   09/24/19 Latoya Helm RN    E.  Coli, wound-leg,11/30/18            Nurse Assessment:  Last Vital Signs: BP (!) 116/53   Pulse 90   Temp 99 °F (37.2 °C) (Bladder)   Resp 22   Ht 5' 4\" (1.626 m)   Wt (!) 330 lb 3.2 oz (149.8 kg)   LMP  (LMP Unknown)   SpO2 97%   BMI 56.68 kg/m²     Last documented pain score (0-10 scale): Pain Level: 0  Last Weight:   Wt Readings from Last 1 Encounters:   03/14/22 (!) 330 lb 3.2 oz (149.8 kg)     Mental Status:  disoriented, alert, and oriented to person/person, place    IV Access:  - PICC - site  R Basilic, insertion date: 3/20/22    Nursing Mobility/ADLs:  Walking   Dependent  Transfer  Dependent  Bathing  Dependent  Dressing  Dependent  Toileting  Dependent  Feeding  Dependent  Med Admin  Dependent  Med Delivery   crushed and via NGT    Wound Care Documentation and Therapy:  Wound 04/14/21 Leg Lower;Right (Active)   Number of days: 341       Wound 04/14/21 Leg Left; Lower (Active)   Number of days: 341       Wound 07/07/21 Leg Lower;Right (Active)   Number of days: 258       Wound 07/07/21 Leg Left; Lower (Active)   Number of days: 258       Wound 08/19/21 Sacrum redness (Active)   Number of days: 215       Wound 08/23/21 Leg Medial;Lower;Right (Active)   Number of days: 211       Wound 03/12/22 Coccyx Mid open area on coccyx (Active)   Wound Etiology Pressure Stage  3 03/18/22 2000   Dressing Status Other (Comment) 03/20/22 2000   Wound Cleansed Cleansed with saline 03/19/22 2000   Dressing/Treatment Open to air 03/21/22 2000   Dressing Change Due 03/15/22 03/14/22 0800   Wound Length (cm) 4 cm 03/12/22 1048   Wound Width (cm) 0.5 cm 03/12/22 1048   Wound Surface Area (cm^2) 2 cm^2 03/12/22 1048   Wound Assessment Other (Comment);Pink/red 03/21/22 2000   Drainage Amount None 03/21/22 2000   Ely-wound Assessment Blanchable erythema 03/21/22 2000   Margins Defined edges 03/15/22 1600   Number of days: 10       Wound 03/11/22 Nose Right (Active)   Wound Etiology Pressure Stage  2 03/17/22 0400   Dressing Status Other (Comment) 03/20/22 2000   Wound Cleansed Soap and water 03/14/22 0800   Dressing/Treatment Open to air 03/21/22 2000   Dressing Change Due 03/14/22 03/14/22 0800   Wound Assessment Pink/red 03/21/22 2000   Drainage Amount None 03/21/22 2000   Odor None 03/14/22 0800   Ely-wound Assessment Blanchable erythema 03/20/22 2000   Margins Defined edges 03/15/22 1600   Number of days: 10        Elimination:  Continence: Bowel: No  Bladder: gerber  Urinary Catheter: Insertion Date: 3/8/22    Colostomy/Ileostomy/Ileal Conduit: No       Date of Last BM: 3/21/22    Intake/Output Summary (Last 24 hours) at 3/22/2022 1403  Last data filed at 3/22/2022 1339  Gross per 24 hour   Intake 1930.21 ml   Output 5225 ml   Net -3294.79 ml     I/O last 3 completed shifts: In: 3325.8 [P.O.:250; I.V.:732.5; Blood:325; NG/GT:1979; IV Piggyback:39.2]  Out: 5525 [Urine:3050]    Safety Concerns: At Risk for Falls and Aspiration Risk    Impairments/Disabilities:      None    Nutrition Therapy:  Current Nutrition Therapy:   - Oral Diet:  Clear Liquid  - Tube Feedings:  Diabetic    Routes of Feeding: Oral and Nasogastric  Liquids: Thin Liquids  Daily Fluid Restriction: no  Last Modified Barium Swallow with Video (Video Swallowing Test): not done    Treatments at the Time of Hospital Discharge:   Respiratory Treatments: Brovana 15 mcg BID. Pulmicort 500 BID, Duoneb unit dose Q6H while awake and Q4H PRN for wheezing  Oxygen Therapy:  is on oxygen at 3 L/min per nasal cannula.   Ventilator:    AVAPS 12/450/35/5 HS and PRN    Rehab Therapies: none  Weight Bearing Status/Restrictions: No weight bearing restrictions  Other Medical Equipment (for information only, NOT a DME order):  hospital bed and bariatric bed, kangaroo pump  Other Treatments: ***    Patient's personal belongings (please select all that are sent with patient):  MELVA Lee RN SIGNATURE:  Electronically signed by Dee Harris RN on 3/22/22 at 2:10 PM EDT    CASE MANAGEMENT/SOCIAL WORK SECTION    Inpatient Status Date: ***    Readmission Risk Assessment Score:  Readmission Risk              Risk of Unplanned Readmission:  40           Discharging to Facility/ Agency   Name:   Address:  Phone:  Fax:    Dialysis Facility (if applicable)   Name:  Address:  Dialysis Schedule:  Phone:  Fax:    / signature: {Esignature:[id number]}    PHYSICIAN SECTION    Prognosis: {Prognosis:0839648385}    Condition at Discharge: [address] [place] [address] Patient Condition:[id number]}    Rehab Potential (if transferring to Rehab): {Prognosis:1982103107}    Recommended Labs or Other Treatments After Discharge: ***    Physician Certification: I certify the above information and transfer of Marylou Parker  is necessary for the continuing treatment of the diagnosis listed and that she requires {Admit to Appropriate Level of Care:02197} for {GREATER/LESS:300578490} 30 days.      Update Admission H&P: {CHP DME Changes in WIW:893701124}    PHYSICIAN SIGNATURE:  {Esignature:[id number]} [see HPI] : see HPI [Negative] : Heme/Lymph

## 2022-03-22 NOTE — PROGRESS NOTES
Date: 3/21/2022    Time: 8:28 PM    Patient Placed On BIPAP/CPAP/ Non-Invasive Ventilation? Yes    If no must comment. Facial area red/color change? No           If YES are Blister/Lesion present? No   If yes must notify nursing staff  BIPAP/CPAP skin barrier?   Yes    Skin barrier type:mepilexlite       Comments:        Deepak Boykin RCP

## 2022-03-22 NOTE — PLAN OF CARE
Problem: Airway Clearance - Ineffective:  Goal: Ability to maintain a clear airway will improve  Description: Ability to maintain a clear airway will improve  Outcome: Met This Shift     Problem: Anxiety/Stress:  Goal: Level of anxiety will decrease  Description: Level of anxiety will decrease  Outcome: Met This Shift     Problem: Aspiration:  Goal: Absence of aspiration  Description: Absence of aspiration  Outcome: Met This Shift     Problem:  Bowel Function - Altered:  Goal: Bowel elimination is within specified parameters  Description: Bowel elimination is within specified parameters  Outcome: Met This Shift     Problem: Cardiac Output - Decreased:  Goal: Hemodynamic stability will improve  Description: Hemodynamic stability will improve  Outcome: Met This Shift     Problem: Fluid Volume - Imbalance:  Goal: Absence of imbalanced fluid volume signs and symptoms  Description: Absence of imbalanced fluid volume signs and symptoms  Outcome: Met This Shift     Problem: Gas Exchange - Impaired:  Goal: Levels of oxygenation will improve  Description: Levels of oxygenation will improve  Outcome: Met This Shift     Problem: Nutrition Deficit:  Goal: Ability to achieve adequate nutritional intake will improve  Description: Ability to achieve adequate nutritional intake will improve  Outcome: Met This Shift     Problem: Pain:  Goal: Pain level will decrease  Description: Pain level will decrease  3/22/2022 1347 by Abner Mendes RN  Outcome: Met This Shift  3/22/2022 0008 by Analisa Brown RN  Outcome: Met This Shift  Goal: Recognizes and communicates pain  Description: Recognizes and communicates pain  Outcome: Met This Shift  Goal: Control of acute pain  Description: Control of acute pain  Outcome: Met This Shift  Goal: Control of chronic pain  Description: Control of chronic pain  Outcome: Met This Shift     Problem: Serum Glucose Level - Abnormal:  Goal: Ability to maintain appropriate glucose levels will improve to within specified parameters  Description: Ability to maintain appropriate glucose levels will improve to within specified parameters  Outcome: Met This Shift     Problem: Skin Integrity - Impaired:  Goal: Will show no infection signs and symptoms  Description: Will show no infection signs and symptoms  Outcome: Met This Shift  Goal: Absence of new skin breakdown  Description: Absence of new skin breakdown  Outcome: Met This Shift     Problem: Sleep Pattern Disturbance:  Goal: Appears well-rested  Description: Appears well-rested  Outcome: Met This Shift     Problem: Tissue Perfusion, Altered:  Goal: Circulatory function within specified parameters  Description: Circulatory function within specified parameters  Outcome: Met This Shift     Problem: Tissue Perfusion - Cardiopulmonary, Altered:  Goal: Absence of angina  Description: Absence of angina  Outcome: Met This Shift  Goal: Hemodynamic stability will improve  Description: Hemodynamic stability will improve  Outcome: Met This Shift     Problem: Skin Integrity:  Goal: Will show no infection signs and symptoms  Description: Will show no infection signs and symptoms  Outcome: Met This Shift  Goal: Absence of new skin breakdown  Description: Absence of new skin breakdown  Outcome: Met This Shift     Problem: Falls - Risk of:  Goal: Will remain free from falls  Description: Will remain free from falls  Outcome: Met This Shift  Goal: Absence of physical injury  Description: Absence of physical injury  Outcome: Met This Shift     Problem: Pain:  Goal: Pain level will decrease  Description: Pain level will decrease  3/22/2022 1347 by Christiana Price RN  Outcome: Met This Shift  3/22/2022 0008 by Jhon Garcia RN  Outcome: Met This Shift  Goal: Control of acute pain  Description: Control of acute pain  Outcome: Met This Shift  Goal: Control of chronic pain  Description: Control of chronic pain  Outcome: Met This Shift

## 2022-03-22 NOTE — DISCHARGE SUMMARY
[address] [address]   Discharge summary   Patient ID:  Marylou Parker  [id number]  74 y.o.  1955    Admit date: 3/8/2022    Discharge date and time: 3/22/2022    Admission Diagnoses:   Patient Active Problem List   Diagnosis    Uncontrolled diabetes mellitus ([facility]    Depression    Essential hypertension    Hyperlipidemia    Osteoarthritis    PAF (paroxysmal atrial fibrillation) (HCC) - currently in SR    Pulmonary hypertension    Chronic sinusitis    Chronic pain    Steatohepatitis    Baker's cyst of knee    Diabetic neuropathy (HCC)    Iron deficiency    LVH (left ventricular hypertrophy)    Chronic anal fissure    Positive hepatitis C antibody test    PFO (patent foramen ovale)    YEIMI (acute kidney injury) ([facility]    Chronic diastolic heart failure (HCC)    Physical deconditioning    Lymphedema of both lower extremities    Chronic anemia    Dyspnea on exertion    Chronic deep vein thrombosis (DVT) of distal vein of right lower extremity (HCC)    GI bleed    Anxiety and depression    Chronic anticoagulation    COPD exacerbation (HCC)    Acute on chronic respiratory failure with hypoxia (HCC)    Blood loss anemia    Cellulitis of left lower extremity    Poorly controlled diabetes mellitus (HCC)    Lymphedema    Septicemia (HCC)    Acute diastolic congestive heart failure (HCC)    Anemia    Morbid obesity with BMI of 45.0-49.9, adult (HCC)    Acute hypoxemic respiratory failure (HCC)    Diastolic CHF, acute on chronic (HCC)    Cellulitis    Stress fracture of right fibula    Atrial fibrillation, currently in sinus rhythm    Gastroesophageal reflux disease without esophagitis    Ulcers of both lower legs (HCC)    Chronic respiratory failure with hypoxia (HCC)    Abscess and cellulitis of gluteal region    CHF (congestive heart failure), NYHA class I, acute on chronic, combined (HCC)    Adrenal mass (HCC)    Hyperglycemia    Atrial fibrillation ([facility]    Infection due to extended-spectrum beta-lactamase-producing Escherichia coli    Bilateral cellulitis of lower leg    Ambulatory dysfunction    Chronic respiratory failure with hypercapnia (HCC)    Moderate pulmonary hypertension (HCC)    QT prolongation    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[facility]    Sepsis ([facility]       Discharge Diagnoses: Sepsis    Consults: cardiology, pulmonary/intensive care and ID    Procedures: None    Hospital Course: The patient is a 74 y.o. female of Joel Asa, DO     58-year-old woman with multiple comorbidities admitted to ICU setting for septic shock on triple pressor support, broad-spectrum IV antibiotic therapy, intubated sedated     Extubated 3/17/2022  Remains in ICU setting  Off pressor support, start midodrine if needed  Bumex for volume removal  blood cultures with alpha hemolytic strep  Cephalexin per ID count resolved  Glargine, insulin sliding scale for blood sugar management,      Amiodarone resumed   Echocardiogram completed-normal LV  Cardiology following     Patient was discharged to [facility] in stable condition and with medications below. Recent Labs     03/20/22  0602 03/20/22  0602 03/20/22  2355 03/21/22  0515 03/22/22  0500   WBC 9.0  --   --  9.4 7.6   HGB 7.2*   < > 7.8* 7.8* 7.6*   HCT 25.0*   < > 26.1* 27.2* 26.6*     --   --  360 340    < > = values in this interval not displayed. Recent Labs     03/20/22  0602 03/21/22  0515 03/22/22  0500    138 136   K 3.6 4.5 4.4    96* 94*   CO2 37* 36* 38*   BUN 16 15 13   CREATININE 0.7 0.7 0.6   CALCIUM 8.1* 8.3* 8.6       XR TIBIA FIBULA LEFT (2 VIEWS)    Result Date: 3/8/2022  EXAMINATION: ONE XRAY VIEW OF THE CHEST; 4 XRAY VIEWS OF THE RIGHT TIBIA AND FIBULA; 5 XRAY VIEWS OF THE LEFT TIBIA AND FIBULA 3/8/2022 7:57 am COMPARISON: 08/18/2021 HISTORY: ORDERING SYSTEM PROVIDED HISTORY: cough TECHNOLOGIST PROVIDED HISTORY: Reason for exam:->cough FINDINGS: Depth of inspiration is limited. Heart appears mildly enlarged. There is suspicion of bibasilar atelectasis and right effusion. .  Study of the chest is limited. There is a left knee prosthesis. There is generalized significant soft tissue swelling about the left calf. There is severe joint space narrowing and subchondral sclerosis at the tibiotalar articulation. There is significant plantar calcaneal spur. Partial fusion at the left tibiotalar articulation is not excluded. Consider ankle study. On the right there is significant generalized soft tissue swelling about the knee region and calf. There is moderately severe joint space narrowing in the medial and lateral compartment of the knee. There is moderate degenerative change at the patellofemoral compartment. There are old healed fractures of the distal diaphysis of the tibia and fibula. There is a significant sized plantar calcaneal spur and a spur at the insertion of the Achilles tendon. Heart is enlarged. Depth of inspiration is limited. Findings could reflect CHF. Significant amount of soft tissue swelling in each lower extremity. There is severe degenerative changes at the left tibiotalar articulation with possible fusion. Recommend ankle images. Moderately severe degenerative changes about the right knee joint. XR TIBIA FIBULA RIGHT (2 VIEWS)    Result Date: 3/8/2022  EXAMINATION: ONE XRAY VIEW OF THE CHEST; 4 XRAY VIEWS OF THE RIGHT TIBIA AND FIBULA; 5 XRAY VIEWS OF THE LEFT TIBIA AND FIBULA 3/8/2022 7:57 am COMPARISON: 08/18/2021 HISTORY: ORDERING SYSTEM PROVIDED HISTORY: cough TECHNOLOGIST PROVIDED HISTORY: Reason for exam:->cough FINDINGS: Depth of inspiration is limited. Heart appears mildly enlarged. There is suspicion of bibasilar atelectasis and right effusion. .  Study of the chest is limited. There is a left knee prosthesis. There is generalized significant soft tissue swelling about the left calf.   There is severe joint space narrowing and subchondral sclerosis at the tibiotalar articulation. There is significant plantar calcaneal spur. Partial fusion at the left tibiotalar articulation is not excluded. Consider ankle study. On the right there is significant generalized soft tissue swelling about the knee region and calf. There is moderately severe joint space narrowing in the medial and lateral compartment of the knee. There is moderate degenerative change at the patellofemoral compartment. There are old healed fractures of the distal diaphysis of the tibia and fibula. There is a significant sized plantar calcaneal spur and a spur at the insertion of the Achilles tendon. Heart is enlarged. Depth of inspiration is limited. Findings could reflect CHF. Significant amount of soft tissue swelling in each lower extremity. There is severe degenerative changes at the left tibiotalar articulation with possible fusion. Recommend ankle images. Moderately severe degenerative changes about the right knee joint. CT ABDOMEN PELVIS W IV CONTRAST Additional Contrast? None    Result Date: 3/8/2022  EXAMINATION: CT OF THE ABDOMEN AND PELVIS WITH CONTRAST 3/8/2022 7:30 am TECHNIQUE: CT of the abdomen and pelvis was performed with the administration of intravenous contrast. Multiplanar reformatted images are provided for review. Dose modulation, iterative reconstruction, and/or weight based adjustment of the mA/kV was utilized to reduce the radiation dose to as low as reasonably achievable. COMPARISON: CT abdomen and pelvis August 18, 2021 and priors.  HISTORY: ORDERING SYSTEM PROVIDED HISTORY: ABD PAIN, DIARRHEA, FEVER TECHNOLOGIST PROVIDED HISTORY: Additional Contrast?->None Reason for exam:->ABD PAIN, DIARRHEA, FEVER Decision Support Exception - unselect if not a suspected or confirmed emergency medical condition->Emergency Medical Condition (MA) FINDINGS: Lower Chest: There is ground-glass and patchy airspace consolidation seen within both visualized lower lobes, right greater than left. See dedicated chest CT same date for lung findings. Organs: Evaluation is limited due to motion artifact as well as streak artifact from the patient's arms lying by their side. A subcentimeter cyst is again seen at the junction of the right and left hepatic lobes. The liver, spleen, pancreas, and left adrenal gland are unremarkable. The gallbladder is mildly distended, otherwise unremarkable. Right adrenal lesion is again seen. This continues to increase in size, now measuring 5 cm on axial image 64, previously measuring 4.4 cm on a similar slice image. Subcentimeter left renal cyst is again seen. Both kidneys are symmetric in size and enhancement. No evidence of hydronephrosis or hydroureter. GI/Bowel: There is a small hiatal hernia. Stomach is otherwise unremarkable. The small and large bowel are normal in caliber. There are few scattered colonic diverticula. No focal wall thickening, obstruction, or acute process identified. The appendix is not definitely visualized. Pelvis: The bladder is unremarkable. Fibroid is seen within the left uterine body. The adnexa are without acute process. Peritoneum/Retroperitoneum: The visualized vasculature is without acute process. There is again noted to be retroperitoneal lymphadenopathy. Previously measured periaortic lymph node measures 1 point 5 cm on image 124, increased in size compared to prior where it measured 1.3 cm. There is bilateral iliac chain lymphadenopathy with stranding along the right iliac chain, increased/new compared to prior. Bilateral inguinal lymphadenopathy is also again seen. Bones/Soft Tissues: Soft tissues and osseous structures are without acute process. Retroperitoneal, iliac chain and inguinal lymphadenopathy is again seen. Retroperitoneal lymphadenopathy and right iliac chain lymphadenopathy has increased compared to priors. Findings concerning for lymphoproliferative process.   Clinical correlation recommended. Right adrenal mass continues to increase in size, now measuring 5 cm. Findings concerning for metastatic disease or primary malignancy until proven otherwise. Gallbladder is distended, otherwise unremarkable. While findings may be related to NPO status, if there is clinical concern for acute cholecystitis right upper quadrant ultrasound may be helpful for further evaluation. CT TIBIA FIBULA LEFT W CONTRAST    Result Date: 3/8/2022  EXAMINATION: CT OF THE LEFT TIBIA AND FIBULA WITH CONTRAST 3/8/2022 8:39 am TECHNIQUE: CT of the left tibia and fibula was performed with the administration of intravenous contrast.  Multiplanar reformatted images are provided for review. Dose modulation, iterative reconstruction, and/or weight based adjustment of the mA/kV was utilized to reduce the radiation dose to as low as reasonably achievable. COMPARISON: Correlation is made with left tibia and fibular radiographs performed earlier in the day. HISTORY ORDERING SYSTEM PROVIDED HISTORY: r/o abscess, r/o gas TECHNOLOGIST PROVIDED HISTORY: Reason for exam:->r/o abscess, r/o gas Decision Support Exception - unselect if not a suspected or confirmed emergency medical condition->Emergency Medical Condition (MA) FINDINGS: Bones: The patient is again status post total left knee arthroplasty. Dense streak artifact created by the orthopedic hardware slightly limits full evaluation of the immediate surrounding osseous and soft tissue structures. The bones and orthopedic hardware are in good alignment and position. No abnormal radiolucencies are identified at the bone/orthopedic hardware interfaces. There is no evidence of fracture or dislocation. Significant osteo-degenerative changes are again identified at the left ankle, with productive osteophyte formation seen at the distal tibia and fibula, as well as the talus. There is severe tibiotalar joint space narrowing, with near bone-on-bone contact.   There is also narrowing of the talofibular joint. Small plantar and posterior calcaneal bone spurs are identified. No other significant osseous abnormality is appreciated. Soft Tissue: There is significant, diffuse subcutaneous soft tissue edema throughout the visualized left lower extremity. It is somewhat more prominent involving the calf than the visualized thigh. No soft tissue mass or abnormal fluid collections are appreciated. There is no evidence of soft tissue gas. The presence of cellulitis cannot be excluded. The visualized musculature is unremarkable for a CT scan. No other significant surrounding soft tissue abnormality is noted. Joint: As above. 1.  Patient status post total left knee arthroplasty. 2.  Significant osteo-degenerative changes involving the left ankle, as described above. 3.  Small calcaneal bone spurs. 4.  Significant, diffuse subcutaneous soft tissue edema throughout the visualized left lower extremity, somewhat more prominent involving the calf in the visualized thigh. Rule out cellulitis. XR CHEST PORTABLE    Result Date: 3/9/2022  EXAMINATION: ONE XRAY VIEW OF THE CHEST 3/9/2022 7:51 am COMPARISON: None HISTORY: ORDERING SYSTEM PROVIDED HISTORY: hypoxia/sepsis TECHNOLOGIST PROVIDED HISTORY: While on vent Reason for exam:->hypoxia/sepsis FINDINGS: The heart is enlarged. The support lines and tubes are unchanged in position. Suboptimal inflation of the lungs is noted. Grossly the right lung is clear. There is vague left perihilar and left lower lobe airspace disease. 1. Vague left perihilar and left lower lobe airspace disease. XR CHEST PORTABLE    Result Date: 3/8/2022  EXAMINATION: ONE XRAY VIEW OF THE CHEST 3/8/2022 10:33 am COMPARISON: 05/08/2022 0902 hours HISTORY: ORDERING SYSTEM PROVIDED HISTORY: line placement TECHNOLOGIST PROVIDED HISTORY: Reason for exam:->line placement FINDINGS: Depth of inspiration not ideal.  Bibasilar atelectasis is suggested. Heart is enlarged. Lines/tubes are appropriate. There has been interval placement a left-sided central line with the tip in the SVC right atrial junction. There is no pneumothorax. Satisfactory placement of left-sided IJ catheter     XR CHEST PORTABLE    Result Date: 3/8/2022  EXAMINATION: ONE XRAY VIEW OF THE CHEST 3/8/2022 8:07 am COMPARISON: 8 March 2022, 0807 hours. HISTORY: ORDERING SYSTEM PROVIDED HISTORY: post ETT adjustment TECHNOLOGIST PROVIDED HISTORY: Reason for exam:->post ETT adjustment FINDINGS: Endotracheal tube has been reposition to the thoracic inlet. NG tube is well within the gastric lumen. Bilateral atelectasis and ground-glass infiltrate are stable. No new abnormal findings. Nasogastric intubation. Repositioning of the endotracheal tube as noted. Stable pulmonary findings. XR CHEST PORTABLE    Result Date: 3/8/2022  EXAMINATION: ONE XRAY VIEW OF THE CHEST 3/8/2022 7:17 am COMPARISON: 8 March 2022, 0736 hours HISTORY: ORDERING SYSTEM PROVIDED HISTORY: intrubation TECHNOLOGIST PROVIDED HISTORY: Reason for exam:->intrubation FINDINGS: Right mainstem bronchus intubation. Recommend the endotracheal tube be pulled back approximately 4.5 cm. There are low lung volumes with linear opacities bilaterally which likely represent atelectasis. Ground-glass airspace disease is suggested as well and was present previously. Right main bronchus intubation. See recommendations above. Atelectasis and ground glass airspace disease. The findings were sent to the Radiology Results [address] at 7:25 am on 3/8/2022to be communicated to a licensed caregiver.      XR CHEST PORTABLE    Result Date: 3/8/2022  EXAMINATION: ONE XRAY VIEW OF THE CHEST; 4 XRAY VIEWS OF THE RIGHT TIBIA AND FIBULA; 5 XRAY VIEWS OF THE LEFT TIBIA AND FIBULA 3/8/2022 7:57 am COMPARISON: 08/18/2021 HISTORY: ORDERING SYSTEM PROVIDED HISTORY: cough TECHNOLOGIST PROVIDED HISTORY: Reason for exam:->cough FINDINGS: Depth of inspiration is limited. Heart appears mildly enlarged. There is suspicion of bibasilar atelectasis and right effusion. .  Study of the chest is limited. There is a left knee prosthesis. There is generalized significant soft tissue swelling about the left calf. There is severe joint space narrowing and subchondral sclerosis at the tibiotalar articulation. There is significant plantar calcaneal spur. Partial fusion at the left tibiotalar articulation is not excluded. Consider ankle study. On the right there is significant generalized soft tissue swelling about the knee region and calf. There is moderately severe joint space narrowing in the medial and lateral compartment of the knee. There is moderate degenerative change at the patellofemoral compartment. There are old healed fractures of the distal diaphysis of the tibia and fibula. There is a significant sized plantar calcaneal spur and a spur at the insertion of the Achilles tendon. Heart is enlarged. Depth of inspiration is limited. Findings could reflect CHF. Significant amount of soft tissue swelling in each lower extremity. There is severe degenerative changes at the left tibiotalar articulation with possible fusion. Recommend ankle images. Moderately severe degenerative changes about the right knee joint. CT TIBIA FIBULA RIGHT W CONTRAST    Result Date: 3/8/2022  EXAMINATION: CT OF THE RIGHT TIBIA AND FIBULA WITH CONTRAST 3/8/2022 8:39 am TECHNIQUE: CT of the right tibia and fibula was performed with the administration of intravenous contrast.  Multiplanar reformatted images are provided for review. Dose modulation, iterative reconstruction, and/or weight based adjustment of the mA/kV was utilized to reduce the radiation dose to as low as reasonably achievable. COMPARISON: Correlation is made with right tibia and fibular radiographs performed earlier in the day.  HISTORY ORDERING SYSTEM PROVIDED HISTORY: r/o abscess, r/o gas TECHNOLOGIST PROVIDED HISTORY: Reason for exam:->r/o abscess, r/o gas Decision Support Exception - unselect if not a suspected or confirmed emergency medical condition->Emergency Medical Condition (MA) FINDINGS: Bones: There is no evidence of acute fracture or dislocation. Again seen are old distal tibia and fibular fractures. There is again mild medial subluxation of the distal femur over the proximal tibia. Significant osteo-degenerative changes are again noted involving the knee, with osteophyte formation present at the distal femur, proximal tibia and fibula, as well as patella. There is again narrowing of the medial and lateral femoral compartments. There is practically bone-on-bone contact involving the medial femoral compartment. There is significant narrowing of the patellofemoral space, with near bone-on-bone contact. A moderate-sized plantar calcaneal bone spur is again noted. There is a large posterior calcaneal bone spur again seen, which again appears fragmented. No other significant osseous abnormality is appreciated. Soft Tissue: There is a suggestion of thickening of the distal aspects of the Achilles tendon. Rule out calcific tendinitis. The presence of a partial tear cannot be excluded. MRI of the ankle can be considered for further evaluation. The remainder of the visualized musculature is unremarkable for CT study. There is significant, diffuse subcutaneous soft tissue edema throughout the visualized right lower extremity. It is somewhat more prominent involving the calf than the visualized thigh. No soft tissue mass or abnormal fluid collections are appreciated. There is no evidence of soft tissue gas. The presence of cellulitis cannot be excluded. There appears to be irregularity involving the very superficial subcutaneous soft tissues of the distal right lower extremity, just above the level of the ankle and medial to the femur. This is suspicious for a large wound.   Clinical correlation is recommended. No other significant surrounding soft tissue abnormality is noted. Joint: As above. 1.  Old distal right tibia and fibular fractures. 2.  Mild medial subluxation of the distal femur over the proximal tibia. 3.  Significant osteo-degenerative changes, as described above. 4.  Calcaneal bone spurs, as described above. Significant, diffuse subcutaneous soft tissue edema throughout the visualized right lower extremity, somewhat more prominent involving the calf than the visualized thigh. Rule out cellulitis. 5.  Suggestion of thickening of the distal aspect of the Achilles tendon. Rule out calcific tendinitis. A partial tear cannot be excluded. MRI of the ankle can be considered for further evaluation. 6.  Irregularity suggested involving the very superficial subcutaneous soft tissues of the distal right lower extremity, just above the ankle and medial to the femur. This is suspicious for a large wound. Clinical correlation is recommended. CTA PULMONARY W CONTRAST    Result Date: 3/8/2022  EXAMINATION: CTA OF THE CHEST 3/8/2022 7:30 am TECHNIQUE: CTA of the chest was performed after the administration of intravenous contrast.  Multiplanar reformatted images are provided for review. MIP images are provided for review. Dose modulation, iterative reconstruction, and/or weight based adjustment of the mA/kV was utilized to reduce the radiation dose to as low as reasonably achievable. COMPARISON: None. HISTORY: ORDERING SYSTEM PROVIDED HISTORY: cough, persistent tachycardia, fever, r/o PE TECHNOLOGIST PROVIDED HISTORY: Reason for exam:->cough, persistent tachycardia, fever, r/o PE Decision Support Exception - unselect if not a suspected or confirmed emergency medical condition->Emergency Medical Condition (MA) FINDINGS: Pulmonary Arteries: Pulmonary arteries are adequately opacified for evaluation. No evidence of intraluminal filling defect to suggest pulmonary embolism.   The main pulmonary trunk measures 4.2 cm in transverse diameter. Mediastinum: No evidence of mediastinal lymphadenopathy. The heart is enlarged. There is no pericardial effusion. Lungs/pleura: There is chronic elevation of the right hemidiaphragm. There is adjacent compression atelectasis. There is ground-glass infiltration seen throughout the right and left lungs more prominent within the perihilar regions. There are patchy infiltrate seen within the left lung base and to a lesser extent within the right lung base. Upper Abdomen:  Limited images of the upper abdomen demonstrate no acute abnormality. Soft Tissues/Bones:  Age related degenerative changes of the visualized osseous structures without focal destructive lesion. 1. There is no pulmonary embolus. 2. Aneurysm dilatation of the pulmonary trunk, right and left main pulmonary arteries consistent with pulmonary hypertension 3. Patchy bibasilar infiltrates favored to represent patchy pneumonia 4. Bilateral perihilar airspace disease favored to represent CHF 5. Chronic elevation right hemidiaphragm with adjacent compression atelectasis. XR ABDOMEN FOR NG/OG/NE TUBE PLACEMENT    Result Date: 3/8/2022  EXAMINATION: ONE SUPINE XRAY VIEW(S) OF THE ABDOMEN 3/8/2022 9:07 am COMPARISON: None. HISTORY: ORDERING SYSTEM PROVIDED HISTORY: Confirmation of course of NG/OG/NE tube and location of tip of tube TECHNOLOGIST PROVIDED HISTORY: Reason for exam:->Confirmation of course of NG/OG/NE tube and location of tip of tube Portable? ->Yes FINDINGS: An endotracheal tube is partially visualized, its tip overlying the mid trachea. The enteric catheter extends below the diaphragm, its tip overlying the gastric pylorus. Imaged portions the upper abdomen do not show an obvious obstructive bowel gas pattern. The enteric catheter appears to be well position, its tip overlying the gastric pylorus.        Discharge Exam:    HEENT: NCAT,  PERRLA, No JVD  Heart:  RRR, no murmurs, gallops, or rubs.  Lungs:  CTA bilaterally, no wheeze, rales or rhonchi  Abd: bowel sounds present, nontender, nondistended, no masses  Extrem:  No clubbing, cyanosis, or edema    Disposition: long term care facility     Patient Condition at Discharge: Stable    Patient Instructions:      Medication List      START taking these medications    QUEtiapine 25 MG tablet  Commonly known as: SEROQUEL  Take 1 tablet by mouth 2 times daily for 240 doses        CHANGE how you take these medications    insulin glargine 100 UNIT/ML injection vial  Commonly known as: LANTUS  Inject 30 Units into the skin 2 times daily *SEE OTHER ORDER*  What changed:   · how much to take  · when to take this  · Another medication with the same name was removed. Continue taking this medication, and follow the directions you see here.         CONTINUE taking these medications    acetaminophen 500 MG tablet  Commonly known as: TYLENOL     apixaban 5 MG Tabs tablet  Commonly known as: ELIQUIS     Bumex 2 MG tablet  Generic drug: bumetanide     * DULoxetine 30 MG extended release capsule  Commonly known as: CYMBALTA     * DULoxetine 60 MG extended release capsule  Commonly known as: CYMBALTA     ezetimibe 10 MG tablet  Commonly known as: ZETIA  Take 1 tablet by mouth nightly     gabapentin 600 MG tablet  Commonly known as: NEURONTIN     hydrOXYzine 50 MG tablet  Commonly known as: ATARAX     * ipratropium 0.02 % nebulizer solution  Commonly known as: ATROVENT     * ipratropium 0.02 % nebulizer solution  Commonly known as: ATROVENT     metoprolol succinate 100 MG extended release tablet  Commonly known as: TOPROL XL  Take 1 tablet by mouth 2 times daily     omega-3 acid ethyl esters 1 g capsule  Commonly known as: LOVAZA  Take 2 capsules by mouth 2 times daily     omeprazole 40 MG delayed release capsule  Commonly known as: PRILOSEC     promethazine 25 MG/ML injection  Commonly known as: PHENERGAN         * This list has 4 medication(s) that are the same as other medications prescribed for you. Read the directions carefully, and ask your doctor or other care provider to review them with you. STOP taking these medications    bisacodyl 10 MG suppository  Commonly known as: DULCOLAX     dilTIAZem 240 MG extended release capsule  Commonly known as: DILACOR XR     dilTIAZem 240 MG extended release capsule  Commonly known as: CARDIZEM CD     docusate sodium 100 MG capsule  Commonly known as: COLACE     ferrous sulfate 325 (65 Fe) MG tablet  Commonly known as: IRON 325     HumaLOG KwikPen 100 UNIT/ML Sopn  Generic drug: insulin lispro (1 Unit Dial)     insulin lispro 100 UNIT/ML injection vial  Commonly known as: HUMALOG     magnesium hydroxide 400 MG/5ML suspension  Commonly known as: MILK OF MAGNESIA     Melatonin 10 MG Tabs     miconazole 2 % powder  Commonly known as: MICOTIN     mineral oil-hydrophilic petrolatum ointment     spironolactone 25 MG tablet  Commonly known as: ALDACTONE     therapeutic multivitamin-minerals tablet     vitamin D 1.25 MG (36874 UT) Caps capsule  Commonly known as: ERGOCALCIFEROL           Where to Get Your Medications      You can get these medications from any pharmacy    Bring a paper prescription for each of these medications  · insulin glargine 100 UNIT/ML injection vial  · QUEtiapine 25 MG tablet       Activity: activity as tolerated  Diet: cardiac diet and diabetic diet    Pt has been advised to: Follow-up with Crista Woodard DO in 1 week.   Follow-up with consultants as recommended by them    Note that over 30 minutes was spent in preparing discharge papers, discussing discharge with patient, medication review, etc.    Signed:  Baby Seip, MD  3/22/2022  1:27 PM

## 2022-03-22 NOTE — PROGRESS NOTES
[address] [place] [address] [facility]  SUZANNE  Progress Note      Chief Complaint   Patient presents with    Cough     started a couple days ago    Hyperglycemia      per EMS    Chills    Nausea     Zofran given per EMS       Subjective: The patient still in the ICU. She is still off the ventilator. Now on a BiPAP. No fevers. Review of systems:  A 10 point review of systems was done. As stated above, otherwise negative.     Medications:   amiodarone        [START ON 3/24/2022] QUEtiapine  25 mg Oral Nightly    pantoprazole  40 mg Oral QAM AC    enoxaparin  1 mg/kg SubCUTAneous BID    QUEtiapine  25 mg Oral BID    amiodarone  400 mg Oral BID    metoprolol succinate  100 mg Oral BID    DULoxetine  30 mg Oral QAM    DULoxetine  60 mg Oral Nightly    hydrOXYzine  50 mg Oral TID    bumetanide  1 mg IntraVENous Daily    cephALEXin  1,000 mg Oral 3 times per day    doxycycline hyclate  100 mg Oral 2 times per day    ipratropium-albuterol  1 ampule Inhalation Q6H WA    Arformoterol Tartrate  15 mcg Nebulization BID    budesonide  0.5 mg Nebulization BID    insulin glargine  30 Units SubCUTAneous BID    sodium chloride flush  5-40 mL IntraVENous 2 times per day    insulin lispro  0-18 Units SubCUTAneous Q4H    miconazole   Topical BID      sodium chloride      dextrose      sodium chloride Stopped (22 1503)     acetaminophen, diazePAM, oxyCODONE, metoprolol, perflutren lipid microspheres, ipratropium-albuterol, sodium chloride flush, sodium chloride, white petrolatum, fentanNYL, dextrose bolus (hypoglycemia) **OR** dextrose bolus (hypoglycemia), glucose, glucagon (rDNA), dextrose, midazolam, acetaminophen, acetaminophen    OBJECTIVE:  BP (!) 116/53   Pulse 87   Temp 99 °F (37.2 °C) (Bladder)   Resp 22   Ht 5' 4\" (1.626 m)   Wt (!) 330 lb 3.2 oz (149.8 kg)   LMP  (LMP Unknown)   SpO2 94%   BMI 56.68 kg/m²   Temp  Av.8 °F (37.1 °C)  Min: 98.2 °F (36.8 °C)  Max: 99.1 °F (37.3 °C)   Constitutional: The patient is lying in bed in the ICU. she is awake and alert. She is on a BiPAP. Skin: Warm and dry. No rashes were noted. HEENT: Eyes show round, and reactive pupils. No jaundice. Moist mucous membranes, no ulcerations, no thrush. NG tube. Neck: Supple to movements. No lymphadenopathy. Chest: No respiratory distress. No crackles. Cardiovascular: Heart sounds rhythmic and regular. Abdomen: Positive bowel heart sounds rhythmic and regular. Sounds to auscultation. Benign to palpation. Large and pendulous. Intertrigo improved. Extremities: Bilateral lower extremity moderate to severe lymphedema. No erythema. Lines: Right PICC 3/20/2022.   Hansen catheter    Laboratory and Tests Review:  Lab Results   Component Value Date    WBC 7.6 03/22/2022    WBC 9.4 03/21/2022    WBC 9.0 03/20/2022    HGB 7.6 (L) 03/22/2022    HCT 26.6 (L) 03/22/2022    MCV 87.5 03/22/2022     03/22/2022     Lab Results   Component Value Date    NEUTROABS 5.31 03/22/2022    NEUTROABS 7.12 03/21/2022    NEUTROABS 6.32 03/20/2022     Lab Results   Component Value Date    CRP 42.1 (H) 03/11/2022    CRP 13.7 (H) 03/08/2022    CRP 5.3 (H) 07/05/2021     No results found for: CRPHS  Lab Results   Component Value Date    SEDRATE 98 (H) 03/11/2022    SEDRATE 30 (H) 03/08/2022    SEDRATE 41 (H) 07/05/2021     Lab Results   Component Value Date    ALT 24 03/22/2022    AST 18 03/22/2022    ALKPHOS 81 03/22/2022    BILITOT 0.5 03/22/2022     Lab Results   Component Value Date     03/22/2022    K 4.4 03/22/2022    K 3.6 03/16/2022    CL 94 03/22/2022    CO2 38 03/22/2022    BUN 13 03/22/2022    CREATININE 0.6 03/22/2022    CREATININE 0.7 03/21/2022    CREATININE 0.7 03/20/2022    GFRAA >60 03/22/2022    LABGLOM >60 03/22/2022    GLUCOSE 166 03/22/2022    GLUCOSE 181 12/16/2011    PROT 5.9 03/22/2022    LABALBU 2.6 03/22/2022    LABALBU 4.5 11/02/2011    CALCIUM 8.6 03/22/2022    BILITOT 0.5 03/22/2022    ALKPHOS 81 03/22/2022    AST 18 03/22/2022    ALT 24 03/22/2022     Radiology:  Reviewed    Microbiology:   Rapid SARS-CoV-2: Negative  Respiratory panel: Negative  Blood cultures 3/8/2022: Group G Streptococcus in 4 of 4 bottles  Blood cultures 3/9/2022: Negative x2  Leg wound culture 3/8/2022: Proteus, MRSA, CoNS nonhemolytic Streptococcus  Nares screen MRSA: Positive    ASSESSMENT:  · Cellulitis right lower extremity. Wound cultures grew mixed organisms, including nonhemolytic Streptococcus, Proteus, MRSA and CoNS  · Group G Streptococcus septicemia probably associated to cellulitis, resolved. Repeat blood cultures were negative  · Possible intra-abdominal infection. Possible cholecystitis  · Sepsis with septic shock, resolved  · Acute respiratory failure, improved  · Leukocytosis, resolved  · Elevation of transaminases. Possible cholecystitis. They seem to be trending downward. Ultrasound shows gallstones and edematous gallbladder wall  · Probable drug-induced fever. She had a pattern of nonremitting fever. It seemed to have improved after stopping Linezolid    PLAN:  · Stop Cephalexin and Doxycycline  · Discontinue contact isolation  · We will follow with you    Spoke with nursing.     Matt Pickett MD  11:35 AM  3/22/2022

## 2022-03-22 NOTE — PROGRESS NOTES
Critical Care Team - Daily Progress Note         Date and time: 3/22/2022 8:11 AM  Patient's name:  [place] [address] Record Number: [id number]  Patient's account/billing number: [de-identified]  Patient's YOB: 1955   Age: 74 y.o. Date of Admission: 3/8/2022  4:28 AM  Length of stay during current admission: 14      Primary Care Physician: Carrol Guerin DO  ICU Attending Physician: Dr. Rise Duverney    Code Status: Full Code    Reason for ICU admission: Respiratory failure, shock      SUBJECTIVE:     OVERNIGHT EVENTS:    3/22: Patient was transitioned to oral amiodarone last evening. She remained in normal sinus rhythm. Patient states she is feeling much better today. She had no acute problems overnight. Plan will be to transfer patient to LTAC from ICU.    3/21: Patient converted to NSR last night. Patient remains on amiodarone drip with Lopressor for breakthrough heart rate greater than 100. Patient is also on Toprol-XL now since she can take oral medications. This morning patient did complain of significant nausea and feeling like she was about to vomit. Otherwise, patient states she is starting to feel much better and that her breathing has improved significantly. 3/20: atrial fibrillation with RVR again, amiodarone drip restarted     3/19: Patient with Afib RVR overnight, hx of Afib RVR. Denied any chest pain, alert and able to converse with staff per report. Cardiology on board and informed, placed on cardizem drip. Patient was remaining with tachycardia and ordered Amio drip. Prior to beginning amio, patient spontaneously converted to sinus rhythm. Patient denies any pain, on BiPAP. 3/18: Patient was extubated on 3/17. Patient is tolerating BiPAP well. She did have an episode overnight of agitation trying to pull off her BiPAP and nasal cannula but when she desaturated she understood that she needed to keep those on. She is still asking for water and food.     3/17: Vent day 10. Patient failed weaning trial again yesterday. Patient experienced episode of tachycardia that was found to be A. fib with RVR. Patient was cardioverted on 3/16. Patient was also started on amiodarone drip. This morning she is in sinus rhythm. Patient was agitated overnight. Patient's agitation resolved with oxycodone administration. Patient remains on Versed, fentanyl, Precedex. She is still on Seroquel. Tube feeds are still running at this time. 3/16: Vent day 9, patient attempted weaning trial yesterday without success. Patient with agitation reported over night Remains on Versed 5 mg , Fentanyl 200 mcg Precedex 1.5 mcg. On Seroquel. Tube feeds running at 50.     3/15: Vent day #8. Patient was still agitated overnight. Patient was unable to be weaned off vent yesterday. We will likely try again today. She remains on Versed, fentanyl, Precedex. This morning she did experience a drop in her blood sugars, likely due to the decrease in steroids. Tube feeds are still running at this time. Lantus will be adjusted today. 3/14: Vent day #7. Overnight patient was extremely agitated. Patient pulled art line. Patient had to be sedated because she kept trying to pull out her ETT. She is currently on versed, fentanyl and precedex. Patient tolerated Seroquel well. 3/13: Patient did not experience any acute events overnight. She has been very agitated however and trying to pull at tube. She is still on pressors at this time. 3/12: Pressors decreased. Mentation has not been appropriate yet. Tolerating tube feeds. 3/11: Patient had no acute problems overnight. She is currently being weaned off of vaso and weaned off sedation. She has been persistently hyperglycemic. 3/10: Pressors were changed from levo to rachelle. She would awake intermittently and be agitated. Precedex was started. Patient is tolerating tube feeds as well.     3/9: Patient has converted to sinus rhythm, opening eyes with spells of anxious awakenings. Levophed stopped. Remains on Vent. 3/8: Patient presented to the ICU with hypotension and respiratory failure. Patient was started on pressors and antibiotics at that time. Patient was also started on amiodarone drip for her A. fib. Patient was also started on insulin drip.     CURRENT VENTILATION STATUS:     [] Ventilator  [x] BIPAP  [x] Nasal Cannula [] Room Air      IF INTUBATED, ET TUBE MARKING AT LOWER LIP:  cms    SECRETIONS  Amount:  [] Small [] Moderate  [] Large  [x] None  Color:     [] White [] Colored  [] Bloody    SEDATION:  RAAS Score:  [] Propofol gtt  [] Versed gtt  [] Fentanyl gtt [] Ativan gtt   [] Precedex    PARALYZED:  [x] No    [] Yes      VASOPRESSORS:  [x] No    [] Yes    If yes -   [] Levophed       [] Dopamine     [] Vasopressin       [] Dobutamine  [] Phenylephrine         [] Epinephrine     CENTRAL LINES:     [x] No   [] Yes   ()   PICC in place in right arm 3/20      If yes -     [] Right IJ     [] Left IJ [] Right Femoral [] Left Femoral                   [] Right Subclavian [] Left Subclavian       BRAUN'S CATHETER:   [] No   [x] Yes  (Date of Insertion: )     URINE OUTPUT:            [x] Good   [] Low              [] Anuric      OBJECTIVE:     VITAL SIGNS:  BP (!) 159/74   Pulse 88   Temp 98.8 °F (37.1 °C) (Bladder)   Resp 19   Ht 5' 4\" (1.626 m)   Wt (!) 330 lb 3.2 oz (149.8 kg)   LMP  (LMP Unknown)   SpO2 95%   BMI 56.68 kg/m²   Tmax over 24 hours:  Temp (24hrs), Av.7 °F (37.1 °C), Min:98.2 °F (36.8 °C), Max:99.1 °F (37.3 °C)      Patient Vitals for the past 6 hrs:   BP Temp Temp src Pulse Resp SpO2   22 0756 -- -- -- -- -- 95 %   22 0730 (!) 159/74 98.8 °F (37.1 °C) Bladder 88 19 97 %   22 0700 (!) 158/73 -- -- 92 (!) 31 96 %   22 0600 (!) 150/66 -- -- 88 26 97 %   22 0500 (!) 175/73 -- -- 88 19 97 %   22 0400 135/72 98.6 °F (37 °C) Bladder 82 28 97 %   22 0351 -- -- -- -- 23 -- 03/22/22 0300 (!) 142/83 -- -- 88 (!) 38 96 %         Intake/Output Summary (Last 24 hours) at 3/22/2022 0811  Last data filed at 3/22/2022 0600  Gross per 24 hour   Intake 1699.21 ml   Output 2550 ml   Net -850.79 ml     Wt Readings from Last 2 Encounters:   03/14/22 (!) 330 lb 3.2 oz (149.8 kg)   08/31/21 (!) 304 lb (137.9 kg)     Body mass index is 56.68 kg/m². PHYSICAL EXAMINATION:    Physical Exam  Constitutional:       Appearance: She is morbidly obese. She is ill-appearing. On BiPaP mask      Interventions: She is awake. HENT:      Head: Normocephalic and atraumatic. Eyes:      Conjunctiva/sclera: Conjunctivae normal.   Neck:      Trachea: No tracheal deviation. Cardiovascular:      Rate and Rhythm: Fast rate, irregular rhythm     Heart sounds: Normal heart sounds. Pulmonary:      Effort: Pulmonary effort is normal. BiPAP on. Breath sounds: Breath sounds present but slightly diminished bilaterally, improving  Abdominal:      General: Bowel sounds are normal.      Palpations: Abdomen is soft. Tenderness: There is no abdominal tenderness. Musculoskeletal:      Cervical back: Neck supple. Right lower leg: Edema present. Left lower leg: Edema present. Lymphadenopathy:      Cervical: No cervical adenopathy. Skin:     General: Skin is warm and dry. Findings: No rash. Comments: +bl lower extremity lymphedema chronic skin changes with right leg fissure erythma   Neurological:      General: No focal deficit present.    Psychiatric:         Behavior: Behavior normal.      Any additional physical findings: n/a    MEDICATIONS:    Scheduled Meds:   pantoprazole  40 mg Oral QAM AC    enoxaparin  1 mg/kg SubCUTAneous BID    QUEtiapine  25 mg Oral BID    amiodarone  400 mg Oral BID    metoprolol succinate  100 mg Oral BID    DULoxetine  30 mg Oral QAM    DULoxetine  60 mg Oral Nightly    hydrOXYzine  50 mg Oral TID    bumetanide  1 mg IntraVENous Daily    cephALEXin 1,000 mg Oral 3 times per day    doxycycline hyclate  100 mg Oral 2 times per day    ipratropium-albuterol  1 ampule Inhalation Q6H WA    Arformoterol Tartrate  15 mcg Nebulization BID    budesonide  0.5 mg Nebulization BID    insulin glargine  30 Units SubCUTAneous BID    sodium chloride flush  5-40 mL IntraVENous 2 times per day    insulin lispro  0-18 Units SubCUTAneous Q4H    miconazole   Topical BID     Continuous Infusions:   sodium chloride      dextrose      sodium chloride Stopped (03/20/22 2643)     PRN Meds:   acetaminophen, 650 mg, Q4H PRN  diazePAM, 5 mg, Q6H PRN  oxyCODONE, 10 mg, Q6H PRN  metoprolol, 5 mg, Q6H PRN  perflutren lipid microspheres, 1.5 mL, ONCE PRN  ipratropium-albuterol, 1 ampule, Q4H PRN  sodium chloride flush, 5-40 mL, PRN  sodium chloride, 25 mL, PRN  white petrolatum, , BID PRN  fentanNYL, 25 mcg, Q2H PRN  dextrose bolus (hypoglycemia), 125 mL, PRN   Or  dextrose bolus (hypoglycemia), 250 mL, PRN  glucose, 15 g, PRN  glucagon (rDNA), 1 mg, PRN  dextrose, 100 mL/hr, PRN  midazolam, 2 mg, Q3H PRN  acetaminophen, 650 mg, Q4H PRN  acetaminophen, 650 mg, Q4H PRN          VENT SETTINGS (Comprehensive) (if applicable):  Vent Information  $Ventilation: Off Vent  Skin Assessment: Clean, dry, & intact  Equipment ID: [id number]  Vent Type: [id number]  Vent Mode: (S) AC/PC  Vt Ordered: 450 mL  Pressure Ordered: (S) 30  Rate Set: 0 bmp  Peak Flow: 0 L/min  Pressure Support: 0 cmH20  FiO2 : 35 %  SpO2: 95 %  SpO2/FiO2 ratio: 277.14  Sensitivity: 2  PEEP/CPAP: 5  I Time/ I Time %: 0.9 s  Humidification Source: Heated wire  Humidification Temp: 37  Humidification Temp Measured: 37.1  Circuit Condensation: Drained  Mask Type: Full face mask  Mask Size: Medium  Additional Respiratory  Assessments  Pulse: 88  Resp: 19  SpO2: 95 %  Position: Semi-Chapin's  Humidification Source: Heated wire  Humidification Temp: 37  Circuit Condensation: Drained  Oral Care: Mouth swabbed,Lip moisturizer applied  Subglottic Suction Done?: Yes  Cuff Pressure (cm H2O): 29 cm H2O    ABGs:   Recent Labs     03/19/22  1408   PH 7.487*   PCO2 45.8*   PO2 109.5*   HCO3 33.9*   BE 9.5*   O2SAT 97.9       Laboratory findings:    Complete Blood Count:   Recent Labs     03/20/22  0602 03/20/22  0602 03/20/22  2355 03/21/22  0515 03/22/22  0500   WBC 9.0  --   --  9.4 7.6   HGB 7.2*   < > 7.8* 7.8* 7.6*   HCT 25.0*   < > 26.1* 27.2* 26.6*     --   --  360 340    < > = values in this interval not displayed. Last 3 Blood Glucose:   Recent Labs     03/20/22  0602 03/21/22  0515 03/22/22  0500   GLUCOSE 187* 267* 166*        PT/INR:    Lab Results   Component Value Date    PROTIME 19.5 04/29/2021    INR 1.8 04/29/2021     PTT:    Lab Results   Component Value Date    APTT 36.3 04/29/2021       Comprehensive Metabolic Profile:   Recent Labs     03/20/22  0602 03/20/22  0602 03/21/22  0515 03/21/22  0515 03/22/22  0500     --  138  --  136   K 3.6  --  4.5  --  4.4     --  96*  --  94*   CO2 37*  --  36*  --  38*   BUN 16  --  15  --  13   CREATININE 0.7  --  0.7  --  0.6   GLUCOSE 187*  --  267*  --  166*   CALCIUM 8.1*  --  8.3*  --  8.6   PROT 5.7*   < > 5.9*   < > 5.9*   LABALBU 2.5*   < > 2.7*   < > 2.6*   BILITOT 0.4   < > 0.6   < > 0.5   ALKPHOS 84   < > 90   < > 81   AST 28   < > 20   < > 18   ALT 31   < > 23   < > 24    < > = values in this interval not displayed. Magnesium:   Lab Results   Component Value Date    MG 2.0 03/22/2022     Phosphorus:   Lab Results   Component Value Date    PHOS 3.0 03/22/2022     Ionized Calcium:   Lab Results   Component Value Date    CAION 1.37 10/24/2016        Urinalysis: No results found for: UA     Troponin: No results for input(s): TROPONINI in the last 72 hours.     Microbiology:   Culture, Blood 2   Date Value Ref Range Status   03/15/2022 5 Days no growth  Final       Cultures during this admission:     Blood cultures:  [] None drawn      [x] Negative [x]  Positive (Details: 3/8 beta strep group G, repeats 3/9 negative)  Urine Culture:   [] None drawn      [x] Negative             []  Positive (Details:  )  Sputum Culture:  [x] None drawn      [] Negative             []  Positive (Details:  )   Wound Culture:  [] None drawn      [] Negative             [x]  Positive (Details: S. Aureus, Proteus sp 3/8  )     Other pertinent Labs:        Radiology/Imaging:     Chest Xray (3/22/2022):       ASSESSMENT:     Principal Problem:    Sepsis ([address]  Resolved Problems:    * No resolved hospital problems. *      PLAN:     WEAN PER PROTOCOL:  [] No   [x] Yes  [] N/A    DISCONTINUE ANY LABS:   [x] No   [] Yes    ICU PROPHYLAXIS:  Stress ulcer:  [x] PPI Agent  [] [id number] [] Sucralfate  [] Other:  VTE:   [x] Enoxaparin  [] Unfract. Heparin Subcut  [] EPC Cuffs     NUTRITION:  [x] NPO [x] Tube Feeding (Specify: ) [] TPN  [] PO (Diet: ADULT TUBE FEEDING; Nasogastric; Diabetic; Continuous; 10; Yes; 15; Q 8 hours; 50; 75; Q 4 hours  ADULT DIET; Clear Liquid; 4 carb choices (60 gm/meal))    HOME MEDICATIONS RECONCILED: [] No  [x] Yes    INSULIN DRIP:   [x] No   [] Yes    CONSULTATION NEEDED:  [x] No   [] Yes    FAMILY UPDATED:    [] No   [x] Yes    TRANSFER OUT OF ICU:   [] No   [] Yes    SYSTEMS ASSESSMENT    Neuro     Extubated on 3/17  OFF Fentanyl, Versed  Agitated at times    Agitation   Seroquel 25 mg BID  Valium 5 mg every 6 hours as needed  Oxycodone 10 mg every 6 hours as needed    Anxiety  Cymbalta 30 mg every morning  Cymbalta 60 mg nightly  Hydroxyzine 50 mg 3 times daily    Respiratory     Metabolic Alkalosis with Respiratory Acidosis  S/P 10 day intubation, Extubated 3/17  Alternating between BiPAP and 6L NC   Home O2 requirements, 6L O2 NC  Pulmicort twice daily  Brovona twice daily  DuoNeb every 4 hours as needed  ABG pending  Wean oxygen as tolerated.  Keep O2 sat 90-92%  Pulmonology following, appreciate input    Cardiovascular     Paroxysmal atrial fibrillation  S/p Cardioversion 3/16  Amiodarone 400 mg BID  Toprol- mg twice daily  Lopressor 5 mg Q6H as needed for heart rate greater than 100  Therapeutic Lovenox  Cardiology following, appreciate input     Septic shock  Blood cultures positive Beta strep group G 3/8, repeats negative  Wound culture 3/8 Staph aureus and Proteus sp  IV NS 20 mL/hr  See infectious disease below    Fluid overload  Bumex 1 mg daily  Strict I's and O's  Continue to monitor     Gastrointestinal     Possible Cholecystitis  Ultrasound 3/11 gallstones and edematous gallbladder wall    GI PPx  Protonix 40 mg IV daily    Renal     CKD stage III, stable  Baseline creatinine 1.1-1.2  Continue to monitor    Electrolyte abnormalities  Replete as needed  Continue to monitor     Infectious Disease     Group G Streptococcus Septicemia  Likely 2/2 to cellulitis of leg (however, cultures grew Proteus)  Blood cultures positive x2 for beta strep group G  S/P Keflex 1 g Q8H 3 times daily (Day 4/4)  Doxycycline 100 mg twice daily (Day 5, Day 14/14 of antibiotics)  ID following, appreciate input    Fever, resolved  Tylenol suppository every 4 hours as needed for fever    Hematology/Oncology     Anticoagulation  Lovenox 150 mg twice daily      Acute on Chronic Normocytic Anemia  Baseline Hgb 9  S/p 1 U pRBC 3/20  Continue to monitor  Transfuse for Hgb <7    Left upper extremity hematoma  Lovenox held for 1 dose last night  Will likely need to be restarted since patient keeps going in atrial fibrillation  Will continue to monitor size of hematoma    Vaginal Bleeding  Suspicion for per nursing while wiping  Will exam if patient allows  Continue to monitor Hgb    Endocrine     T2DM  Lantus 30 units twice daily  HDSSI  Hypoglycemia protocol  POC glucose checks    Social/Spiritual/DNR/Other     Full code  will try to plan for possible LTAC if patient able to remain stable for > 24 hours    Lina Ramachandran DO, PGY-1            3/22/2022, 8:11 AM Critical Care Attending Addendum:    Patient seen and examined with the house staff. X-rays personally reviewed through the PACS. Family is updated at the bedside as available. Additional findings listed below as necessary. Additional comments:  1. Tired today but otherwise without symptoms. ABGs fine on AVAPS. 2. Abx for cellulitis have been completed. 3. Anemic but acceptable. 4. Afib rate okay and on enoxaparin. 5. Okay to [facility]. with Dr. Marcy Gaitan to follow

## 2022-03-22 NOTE — PATIENT CARE CONFERENCE
Intensive Care Daily Quality Rounding Checklist        ICU Team Members: Dr. Rachel Braga, Franklyn Scanlon, Diana (residents), charge nurse, bedside nurse, clinical pharmacist     ICU Day #: NUMBER: 15     Intubation Date: N/A     Ventilator Day #: N/A     Central Line Insertion Date: N/A                                                    Day #:  N/A      Arterial Line Insertion Date:  N/A                             Day #: N/A     Temporary Hemodialysis Catheter Insertion Date:  N/A                             Day # N/A     DVT Prophylaxis: Lovenox    GI Prophylaxis: Protonix     Hansen Catheter Insertion Date: March 8                                        Day #: 15                             Continued need (if yes, reason documented and discussed with physician): yes, need for accurate I+O's---getting Bumex     Skin Issues/ Wounds and ordered treatment discussed on rounds: yes, has wound on coccyx, SOS precautions, on bariatric low air loss bed     Goals/ Plans for the Day:labs, Bipap as needed,transfer to [facility]

## 2022-10-03 ENCOUNTER — HOSPITAL ENCOUNTER (INPATIENT)
Age: 67
LOS: 2 days | Discharge: NURSING, REHAB OR LONG-TERM CARE | DRG: 189 | End: 2022-10-06
Attending: EMERGENCY MEDICINE | Admitting: INTERNAL MEDICINE
Payer: MEDICARE

## 2022-10-03 ENCOUNTER — APPOINTMENT (OUTPATIENT)
Dept: GENERAL RADIOLOGY | Age: 67
DRG: 189 | End: 2022-10-03
Payer: MEDICARE

## 2022-10-03 DIAGNOSIS — J44.1 COPD EXACERBATION (HCC): Primary | ICD-10-CM

## 2022-10-03 DIAGNOSIS — J96.02 ACUTE RESPIRATORY FAILURE WITH HYPERCAPNIA (HCC): ICD-10-CM

## 2022-10-03 PROCEDURE — 87502 INFLUENZA DNA AMP PROBE: CPT

## 2022-10-03 PROCEDURE — 71045 X-RAY EXAM CHEST 1 VIEW: CPT

## 2022-10-03 PROCEDURE — 94640 AIRWAY INHALATION TREATMENT: CPT

## 2022-10-03 PROCEDURE — 87635 SARS-COV-2 COVID-19 AMP PRB: CPT

## 2022-10-03 PROCEDURE — 94664 DEMO&/EVAL PT USE INHALER: CPT

## 2022-10-03 PROCEDURE — 6370000000 HC RX 637 (ALT 250 FOR IP): Performed by: EMERGENCY MEDICINE

## 2022-10-03 RX ORDER — IPRATROPIUM BROMIDE AND ALBUTEROL SULFATE 2.5; .5 MG/3ML; MG/3ML
3 SOLUTION RESPIRATORY (INHALATION) ONCE
Status: COMPLETED | OUTPATIENT
Start: 2022-10-03 | End: 2022-10-03

## 2022-10-03 RX ORDER — METHYLPREDNISOLONE SODIUM SUCCINATE 125 MG/2ML
125 INJECTION, POWDER, LYOPHILIZED, FOR SOLUTION INTRAMUSCULAR; INTRAVENOUS ONCE
Status: COMPLETED | OUTPATIENT
Start: 2022-10-03 | End: 2022-10-04

## 2022-10-03 RX ADMIN — IPRATROPIUM BROMIDE AND ALBUTEROL SULFATE 3 AMPULE: .5; 2.5 SOLUTION RESPIRATORY (INHALATION) at 22:28

## 2022-10-03 ASSESSMENT — PAIN - FUNCTIONAL ASSESSMENT: PAIN_FUNCTIONAL_ASSESSMENT: NONE - DENIES PAIN

## 2022-10-04 PROBLEM — J96.22 ACUTE AND CHRONIC RESPIRATORY FAILURE WITH HYPERCAPNIA (HCC): Status: ACTIVE | Noted: 2022-10-04

## 2022-10-04 PROBLEM — E11.9 DM (DIABETES MELLITUS) (HCC): Status: ACTIVE | Noted: 2022-10-04

## 2022-10-04 LAB
ALBUMIN SERPL-MCNC: 3.5 G/DL (ref 3.5–5.2)
ALP BLD-CCNC: 93 U/L (ref 35–104)
ALT SERPL-CCNC: 7 U/L (ref 0–32)
ANION GAP SERPL CALCULATED.3IONS-SCNC: 8 MMOL/L (ref 7–16)
AST SERPL-CCNC: 10 U/L (ref 0–31)
B.E.: 14.1 MMOL/L (ref -3–3)
B.E.: 17.6 MMOL/L (ref -3–3)
BASOPHILS ABSOLUTE: 0.02 E9/L (ref 0–0.2)
BASOPHILS RELATIVE PERCENT: 0.3 % (ref 0–2)
BILIRUB SERPL-MCNC: <0.2 MG/DL (ref 0–1.2)
BUN BLDV-MCNC: 22 MG/DL (ref 6–23)
CALCIUM SERPL-MCNC: 9.4 MG/DL (ref 8.6–10.2)
CHLORIDE BLD-SCNC: 91 MMOL/L (ref 98–107)
CO2: 39 MMOL/L (ref 22–29)
COHB: 1.2 % (ref 0–1.5)
COHB: 1.8 % (ref 0–1.5)
COMMENT: ABNORMAL
CREAT SERPL-MCNC: 0.7 MG/DL (ref 0.5–1)
CRITICAL: ABNORMAL
CRITICAL: ABNORMAL
DATE ANALYZED: ABNORMAL
DATE ANALYZED: ABNORMAL
DATE OF COLLECTION: ABNORMAL
DATE OF COLLECTION: ABNORMAL
EKG ATRIAL RATE: 104 BPM
EKG P AXIS: 50 DEGREES
EKG P-R INTERVAL: 150 MS
EKG Q-T INTERVAL: 376 MS
EKG QRS DURATION: 104 MS
EKG QTC CALCULATION (BAZETT): 494 MS
EKG R AXIS: -136 DEGREES
EKG T AXIS: 47 DEGREES
EKG VENTRICULAR RATE: 104 BPM
EOSINOPHILS ABSOLUTE: 0.35 E9/L (ref 0.05–0.5)
EOSINOPHILS RELATIVE PERCENT: 5.6 % (ref 0–6)
GFR AFRICAN AMERICAN: >60
GFR NON-AFRICAN AMERICAN: >60 ML/MIN/1.73
GLUCOSE BLD-MCNC: 287 MG/DL (ref 74–99)
HCO3: 41.6 MMOL/L (ref 22–26)
HCO3: 45.9 MMOL/L (ref 22–26)
HCT VFR BLD CALC: 31 % (ref 34–48)
HEMOGLOBIN: 9.3 G/DL (ref 11.5–15.5)
HHB: 3.3 % (ref 0–5)
HHB: 3.7 % (ref 0–5)
IMMATURE GRANULOCYTES #: 0.01 E9/L
IMMATURE GRANULOCYTES %: 0.2 % (ref 0–5)
INFLUENZA A BY PCR: NOT DETECTED
INFLUENZA B BY PCR: NOT DETECTED
LAB: ABNORMAL
LAB: ABNORMAL
LYMPHOCYTES ABSOLUTE: 1.59 E9/L (ref 1.5–4)
LYMPHOCYTES RELATIVE PERCENT: 25.4 % (ref 20–42)
Lab: ABNORMAL
Lab: ABNORMAL
MCH RBC QN AUTO: 26 PG (ref 26–35)
MCHC RBC AUTO-ENTMCNC: 30 % (ref 32–34.5)
MCV RBC AUTO: 86.6 FL (ref 80–99.9)
METER GLUCOSE: 262 MG/DL (ref 74–99)
METER GLUCOSE: 358 MG/DL (ref 74–99)
METER GLUCOSE: 388 MG/DL (ref 74–99)
METER GLUCOSE: 463 MG/DL (ref 74–99)
METHB: 0.3 % (ref 0–1.5)
METHB: 0.3 % (ref 0–1.5)
MODE: ABNORMAL
MODE: ABNORMAL
MONOCYTES ABSOLUTE: 0.38 E9/L (ref 0.1–0.95)
MONOCYTES RELATIVE PERCENT: 6.1 % (ref 2–12)
NEUTROPHILS ABSOLUTE: 3.9 E9/L (ref 1.8–7.3)
NEUTROPHILS RELATIVE PERCENT: 62.4 % (ref 43–80)
O2 CONTENT: 12.4 ML/DL
O2 CONTENT: 13.1 ML/DL
O2 SATURATION: 96.2 % (ref 92–98.5)
O2 SATURATION: 96.6 % (ref 92–98.5)
O2HB: 94.6 % (ref 94–97)
O2HB: 94.8 % (ref 94–97)
OPERATOR ID: ABNORMAL
OPERATOR ID: ABNORMAL
PATIENT TEMP: 37 C
PATIENT TEMP: 37 C
PCO2: 73.1 MMHG (ref 35–45)
PCO2: 81.6 MMHG (ref 35–45)
PDW BLD-RTO: 13.9 FL (ref 11.5–15)
PH BLOOD GAS: 7.37 (ref 7.35–7.45)
PH BLOOD GAS: 7.37 (ref 7.35–7.45)
PLATELET # BLD: 177 E9/L (ref 130–450)
PMV BLD AUTO: 10 FL (ref 7–12)
PO2: 88.2 MMHG (ref 75–100)
PO2: 90.5 MMHG (ref 75–100)
POTASSIUM SERPL-SCNC: 3.8 MMOL/L (ref 3.5–5)
PRO-BNP: 179 PG/ML (ref 0–125)
RBC # BLD: 3.58 E12/L (ref 3.5–5.5)
SARS-COV-2, NAAT: NOT DETECTED
SODIUM BLD-SCNC: 138 MMOL/L (ref 132–146)
SOURCE, BLOOD GAS: ABNORMAL
SOURCE, BLOOD GAS: ABNORMAL
THB: 9.2 G/DL (ref 11.5–16.5)
THB: 9.7 G/DL (ref 11.5–16.5)
TIME ANALYZED: 116
TIME ANALYZED: 118
TOTAL PROTEIN: 6.1 G/DL (ref 6.4–8.3)
TROPONIN, HIGH SENSITIVITY: 16 NG/L (ref 0–9)
TROPONIN, HIGH SENSITIVITY: 17 NG/L (ref 0–9)
WBC # BLD: 6.3 E9/L (ref 4.5–11.5)

## 2022-10-04 PROCEDURE — 6360000002 HC RX W HCPCS

## 2022-10-04 PROCEDURE — 51702 INSERT TEMP BLADDER CATH: CPT

## 2022-10-04 PROCEDURE — 99285 EMERGENCY DEPT VISIT HI MDM: CPT

## 2022-10-04 PROCEDURE — 6370000000 HC RX 637 (ALT 250 FOR IP): Performed by: EMERGENCY MEDICINE

## 2022-10-04 PROCEDURE — 2700000000 HC OXYGEN THERAPY PER DAY

## 2022-10-04 PROCEDURE — 6360000002 HC RX W HCPCS: Performed by: EMERGENCY MEDICINE

## 2022-10-04 PROCEDURE — 2500000003 HC RX 250 WO HCPCS: Performed by: INTERNAL MEDICINE

## 2022-10-04 PROCEDURE — 93005 ELECTROCARDIOGRAM TRACING: CPT | Performed by: EMERGENCY MEDICINE

## 2022-10-04 PROCEDURE — 82962 GLUCOSE BLOOD TEST: CPT

## 2022-10-04 PROCEDURE — 2580000003 HC RX 258

## 2022-10-04 PROCEDURE — 6370000000 HC RX 637 (ALT 250 FOR IP)

## 2022-10-04 PROCEDURE — 80053 COMPREHEN METABOLIC PANEL: CPT

## 2022-10-04 PROCEDURE — 2060000000 HC ICU INTERMEDIATE R&B

## 2022-10-04 PROCEDURE — 97161 PT EVAL LOW COMPLEX 20 MIN: CPT

## 2022-10-04 PROCEDURE — 97165 OT EVAL LOW COMPLEX 30 MIN: CPT

## 2022-10-04 PROCEDURE — 83880 ASSAY OF NATRIURETIC PEPTIDE: CPT

## 2022-10-04 PROCEDURE — 94660 CPAP INITIATION&MGMT: CPT

## 2022-10-04 PROCEDURE — 96374 THER/PROPH/DIAG INJ IV PUSH: CPT

## 2022-10-04 PROCEDURE — 82805 BLOOD GASES W/O2 SATURATION: CPT

## 2022-10-04 PROCEDURE — 85025 COMPLETE CBC W/AUTO DIFF WBC: CPT

## 2022-10-04 PROCEDURE — 84484 ASSAY OF TROPONIN QUANT: CPT

## 2022-10-04 PROCEDURE — 94640 AIRWAY INHALATION TREATMENT: CPT

## 2022-10-04 PROCEDURE — 6370000000 HC RX 637 (ALT 250 FOR IP): Performed by: INTERNAL MEDICINE

## 2022-10-04 RX ORDER — SODIUM CHLORIDE 0.9 % (FLUSH) 0.9 %
5-40 SYRINGE (ML) INJECTION PRN
Status: DISCONTINUED | OUTPATIENT
Start: 2022-10-04 | End: 2022-10-06 | Stop reason: HOSPADM

## 2022-10-04 RX ORDER — TRAZODONE HYDROCHLORIDE 50 MG/1
50 TABLET ORAL NIGHTLY
COMMUNITY

## 2022-10-04 RX ORDER — ACETAMINOPHEN 500 MG
1000 TABLET ORAL ONCE
Status: COMPLETED | OUTPATIENT
Start: 2022-10-04 | End: 2022-10-04

## 2022-10-04 RX ORDER — POTASSIUM CHLORIDE 1.5 G/1.77G
20 POWDER, FOR SOLUTION ORAL DAILY
Status: ON HOLD | COMMUNITY
End: 2022-10-06 | Stop reason: HOSPADM

## 2022-10-04 RX ORDER — BUMETANIDE 0.25 MG/ML
1 INJECTION, SOLUTION INTRAMUSCULAR; INTRAVENOUS 2 TIMES DAILY
Status: DISCONTINUED | OUTPATIENT
Start: 2022-10-04 | End: 2022-10-06 | Stop reason: HOSPADM

## 2022-10-04 RX ORDER — ACETAMINOPHEN 650 MG/1
650 SUPPOSITORY RECTAL EVERY 6 HOURS PRN
Status: DISCONTINUED | OUTPATIENT
Start: 2022-10-04 | End: 2022-10-06 | Stop reason: HOSPADM

## 2022-10-04 RX ORDER — HYDROXYZINE PAMOATE 25 MG/1
50 CAPSULE ORAL 3 TIMES DAILY
Status: DISCONTINUED | OUTPATIENT
Start: 2022-10-04 | End: 2022-10-06 | Stop reason: HOSPADM

## 2022-10-04 RX ORDER — BUMETANIDE 1 MG/1
2 TABLET ORAL 2 TIMES DAILY
Status: DISCONTINUED | OUTPATIENT
Start: 2022-10-04 | End: 2022-10-04

## 2022-10-04 RX ORDER — INSULIN GLARGINE 100 [IU]/ML
30 INJECTION, SOLUTION SUBCUTANEOUS 2 TIMES DAILY
Status: DISCONTINUED | OUTPATIENT
Start: 2022-10-04 | End: 2022-10-06 | Stop reason: HOSPADM

## 2022-10-04 RX ORDER — SODIUM CHLORIDE 0.9 % (FLUSH) 0.9 %
5-40 SYRINGE (ML) INJECTION EVERY 12 HOURS SCHEDULED
Status: DISCONTINUED | OUTPATIENT
Start: 2022-10-04 | End: 2022-10-06 | Stop reason: HOSPADM

## 2022-10-04 RX ORDER — POLYETHYLENE GLYCOL 3350 17 G/17G
17 POWDER, FOR SOLUTION ORAL DAILY PRN
Status: ON HOLD | COMMUNITY
End: 2022-10-06 | Stop reason: HOSPADM

## 2022-10-04 RX ORDER — DULOXETIN HYDROCHLORIDE 60 MG/1
60 CAPSULE, DELAYED RELEASE ORAL NIGHTLY
Status: DISCONTINUED | OUTPATIENT
Start: 2022-10-04 | End: 2022-10-06 | Stop reason: HOSPADM

## 2022-10-04 RX ORDER — GABAPENTIN 300 MG/1
300 CAPSULE ORAL ONCE
Status: COMPLETED | OUTPATIENT
Start: 2022-10-04 | End: 2022-10-04

## 2022-10-04 RX ORDER — OMEGA-3-ACID ETHYL ESTERS 1 G/1
2 CAPSULE, LIQUID FILLED ORAL 2 TIMES DAILY
Status: DISCONTINUED | OUTPATIENT
Start: 2022-10-04 | End: 2022-10-06 | Stop reason: HOSPADM

## 2022-10-04 RX ORDER — DEXTROSE MONOHYDRATE 100 MG/ML
INJECTION, SOLUTION INTRAVENOUS CONTINUOUS PRN
Status: DISCONTINUED | OUTPATIENT
Start: 2022-10-04 | End: 2022-10-06 | Stop reason: HOSPADM

## 2022-10-04 RX ORDER — DOCUSATE SODIUM 100 MG/1
100 CAPSULE, LIQUID FILLED ORAL 2 TIMES DAILY
Status: ON HOLD | COMMUNITY
End: 2022-10-06 | Stop reason: HOSPADM

## 2022-10-04 RX ORDER — INSULIN LISPRO 100 [IU]/ML
0-4 INJECTION, SOLUTION INTRAVENOUS; SUBCUTANEOUS
Status: DISCONTINUED | OUTPATIENT
Start: 2022-10-04 | End: 2022-10-04

## 2022-10-04 RX ORDER — INSULIN LISPRO 100 [IU]/ML
0-4 INJECTION, SOLUTION INTRAVENOUS; SUBCUTANEOUS NIGHTLY
Status: DISCONTINUED | OUTPATIENT
Start: 2022-10-04 | End: 2022-10-06 | Stop reason: HOSPADM

## 2022-10-04 RX ORDER — NADOLOL 20 MG/1
10 TABLET ORAL DAILY
Status: ON HOLD | COMMUNITY
End: 2022-10-06 | Stop reason: HOSPADM

## 2022-10-04 RX ORDER — AMIODARONE HYDROCHLORIDE 100 MG/1
100 TABLET ORAL DAILY
COMMUNITY

## 2022-10-04 RX ORDER — PREDNISONE 20 MG/1
20 TABLET ORAL 2 TIMES DAILY
Status: DISCONTINUED | OUTPATIENT
Start: 2022-10-06 | End: 2022-10-06 | Stop reason: HOSPADM

## 2022-10-04 RX ORDER — SENNA PLUS 8.6 MG/1
2 TABLET ORAL DAILY
COMMUNITY

## 2022-10-04 RX ORDER — EZETIMIBE 10 MG/1
10 TABLET ORAL NIGHTLY
Status: DISCONTINUED | OUTPATIENT
Start: 2022-10-04 | End: 2022-10-06 | Stop reason: HOSPADM

## 2022-10-04 RX ORDER — INSULIN LISPRO 100 [IU]/ML
0-4 INJECTION, SOLUTION INTRAVENOUS; SUBCUTANEOUS NIGHTLY
Status: DISCONTINUED | OUTPATIENT
Start: 2022-10-04 | End: 2022-10-04

## 2022-10-04 RX ORDER — ACETAMINOPHEN 500 MG
1000 TABLET ORAL EVERY 6 HOURS PRN
Status: DISCONTINUED | OUTPATIENT
Start: 2022-10-04 | End: 2022-10-06 | Stop reason: HOSPADM

## 2022-10-04 RX ORDER — OXYCODONE HCL 10 MG/1
10 TABLET, FILM COATED, EXTENDED RELEASE ORAL EVERY 6 HOURS PRN
COMMUNITY

## 2022-10-04 RX ORDER — GABAPENTIN 300 MG/1
600 CAPSULE ORAL 3 TIMES DAILY
Status: DISCONTINUED | OUTPATIENT
Start: 2022-10-04 | End: 2022-10-06 | Stop reason: HOSPADM

## 2022-10-04 RX ORDER — ACETAMINOPHEN 325 MG/1
650 TABLET ORAL EVERY 6 HOURS PRN
Status: DISCONTINUED | OUTPATIENT
Start: 2022-10-04 | End: 2022-10-06 | Stop reason: HOSPADM

## 2022-10-04 RX ORDER — SODIUM CHLORIDE 9 MG/ML
INJECTION, SOLUTION INTRAVENOUS PRN
Status: DISCONTINUED | OUTPATIENT
Start: 2022-10-04 | End: 2022-10-06 | Stop reason: HOSPADM

## 2022-10-04 RX ORDER — OYSTER SHELL CALCIUM WITH VITAMIN D 500; 200 MG/1; [IU]/1
1 TABLET, FILM COATED ORAL 2 TIMES DAILY
Status: ON HOLD | COMMUNITY
End: 2022-10-06 | Stop reason: HOSPADM

## 2022-10-04 RX ORDER — PANTOPRAZOLE SODIUM 40 MG/1
40 TABLET, DELAYED RELEASE ORAL
Status: DISCONTINUED | OUTPATIENT
Start: 2022-10-04 | End: 2022-10-06 | Stop reason: HOSPADM

## 2022-10-04 RX ORDER — INSULIN LISPRO 100 [IU]/ML
0-16 INJECTION, SOLUTION INTRAVENOUS; SUBCUTANEOUS
Status: DISCONTINUED | OUTPATIENT
Start: 2022-10-04 | End: 2022-10-06 | Stop reason: HOSPADM

## 2022-10-04 RX ORDER — PANTOPRAZOLE SODIUM 40 MG/1
40 TABLET, DELAYED RELEASE ORAL DAILY
COMMUNITY

## 2022-10-04 RX ORDER — METOPROLOL SUCCINATE 100 MG/1
100 TABLET, EXTENDED RELEASE ORAL 2 TIMES DAILY
Status: DISCONTINUED | OUTPATIENT
Start: 2022-10-04 | End: 2022-10-06 | Stop reason: HOSPADM

## 2022-10-04 RX ORDER — METHYLPREDNISOLONE SODIUM SUCCINATE 40 MG/ML
40 INJECTION, POWDER, LYOPHILIZED, FOR SOLUTION INTRAMUSCULAR; INTRAVENOUS EVERY 12 HOURS
Status: COMPLETED | OUTPATIENT
Start: 2022-10-04 | End: 2022-10-05

## 2022-10-04 RX ORDER — AMLODIPINE BESYLATE 10 MG/1
10 TABLET ORAL DAILY
Status: ON HOLD | COMMUNITY
End: 2022-10-06 | Stop reason: HOSPADM

## 2022-10-04 RX ADMIN — OMEGA-3-ACID ETHYL ESTERS 2 G: 900 CAPSULE ORAL at 11:04

## 2022-10-04 RX ADMIN — APIXABAN 5 MG: 5 TABLET, FILM COATED ORAL at 20:46

## 2022-10-04 RX ADMIN — INSULIN GLARGINE 30 UNITS: 100 INJECTION, SOLUTION SUBCUTANEOUS at 20:52

## 2022-10-04 RX ADMIN — METHYLPREDNISOLONE SODIUM SUCCINATE 40 MG: 40 INJECTION, POWDER, LYOPHILIZED, FOR SOLUTION INTRAMUSCULAR; INTRAVENOUS at 17:58

## 2022-10-04 RX ADMIN — GABAPENTIN 600 MG: 300 CAPSULE ORAL at 14:09

## 2022-10-04 RX ADMIN — METHYLPREDNISOLONE SODIUM SUCCINATE 40 MG: 40 INJECTION, POWDER, LYOPHILIZED, FOR SOLUTION INTRAMUSCULAR; INTRAVENOUS at 08:47

## 2022-10-04 RX ADMIN — METOPROLOL SUCCINATE 100 MG: 100 TABLET, EXTENDED RELEASE ORAL at 08:47

## 2022-10-04 RX ADMIN — SODIUM CHLORIDE, PRESERVATIVE FREE 10 ML: 5 INJECTION INTRAVENOUS at 20:47

## 2022-10-04 RX ADMIN — METHYLPREDNISOLONE SODIUM SUCCINATE 125 MG: 125 INJECTION, POWDER, FOR SOLUTION INTRAMUSCULAR; INTRAVENOUS at 01:00

## 2022-10-04 RX ADMIN — PANTOPRAZOLE SODIUM 40 MG: 40 TABLET, DELAYED RELEASE ORAL at 08:47

## 2022-10-04 RX ADMIN — SODIUM CHLORIDE, PRESERVATIVE FREE 10 ML: 5 INJECTION INTRAVENOUS at 08:47

## 2022-10-04 RX ADMIN — IPRATROPIUM BROMIDE 0.5 MG: 0.5 SOLUTION RESPIRATORY (INHALATION) at 12:59

## 2022-10-04 RX ADMIN — BUMETANIDE 1 MG: 0.25 INJECTION INTRAMUSCULAR; INTRAVENOUS at 20:46

## 2022-10-04 RX ADMIN — INSULIN LISPRO 4 UNITS: 100 INJECTION, SOLUTION INTRAVENOUS; SUBCUTANEOUS at 20:52

## 2022-10-04 RX ADMIN — INSULIN LISPRO 4 UNITS: 100 INJECTION, SOLUTION INTRAVENOUS; SUBCUTANEOUS at 11:49

## 2022-10-04 RX ADMIN — MICONAZOLE NITRATE: 2 POWDER TOPICAL at 20:55

## 2022-10-04 RX ADMIN — GABAPENTIN 600 MG: 300 CAPSULE ORAL at 08:47

## 2022-10-04 RX ADMIN — IPRATROPIUM BROMIDE 0.5 MG: 0.5 SOLUTION RESPIRATORY (INHALATION) at 19:51

## 2022-10-04 RX ADMIN — HYDROXYZINE PAMOATE 50 MG: 25 CAPSULE ORAL at 20:46

## 2022-10-04 RX ADMIN — HYDROXYZINE PAMOATE 50 MG: 25 CAPSULE ORAL at 14:09

## 2022-10-04 RX ADMIN — APIXABAN 5 MG: 5 TABLET, FILM COATED ORAL at 08:47

## 2022-10-04 RX ADMIN — OMEGA-3-ACID ETHYL ESTERS 2 G: 900 CAPSULE ORAL at 20:46

## 2022-10-04 RX ADMIN — ACETAMINOPHEN 1000 MG: 500 TABLET ORAL at 12:12

## 2022-10-04 RX ADMIN — HYDROXYZINE PAMOATE 50 MG: 25 CAPSULE ORAL at 11:04

## 2022-10-04 RX ADMIN — BUMETANIDE 2 MG: 1 TABLET ORAL at 08:47

## 2022-10-04 RX ADMIN — GABAPENTIN 600 MG: 300 CAPSULE ORAL at 20:46

## 2022-10-04 RX ADMIN — INSULIN LISPRO 2 UNITS: 100 INJECTION, SOLUTION INTRAVENOUS; SUBCUTANEOUS at 08:54

## 2022-10-04 RX ADMIN — METOPROLOL SUCCINATE 100 MG: 100 TABLET, EXTENDED RELEASE ORAL at 20:46

## 2022-10-04 RX ADMIN — MICONAZOLE NITRATE: 2 POWDER TOPICAL at 14:11

## 2022-10-04 RX ADMIN — IPRATROPIUM BROMIDE 0.5 MG: 0.5 SOLUTION RESPIRATORY (INHALATION) at 09:28

## 2022-10-04 RX ADMIN — DULOXETINE HYDROCHLORIDE 60 MG: 60 CAPSULE, DELAYED RELEASE ORAL at 20:46

## 2022-10-04 RX ADMIN — INSULIN LISPRO 16 UNITS: 100 INJECTION, SOLUTION INTRAVENOUS; SUBCUTANEOUS at 15:50

## 2022-10-04 RX ADMIN — ACETAMINOPHEN 1000 MG: 500 TABLET ORAL at 02:29

## 2022-10-04 RX ADMIN — GABAPENTIN 300 MG: 300 CAPSULE ORAL at 02:29

## 2022-10-04 RX ADMIN — INSULIN GLARGINE 30 UNITS: 100 INJECTION, SOLUTION SUBCUTANEOUS at 08:47

## 2022-10-04 RX ADMIN — EZETIMIBE 10 MG: 10 TABLET ORAL at 20:46

## 2022-10-04 ASSESSMENT — PAIN SCALES - GENERAL: PAINLEVEL_OUTOF10: 5

## 2022-10-04 ASSESSMENT — PAIN DESCRIPTION - LOCATION: LOCATION: FOOT

## 2022-10-04 ASSESSMENT — PAIN DESCRIPTION - ORIENTATION: ORIENTATION: RIGHT;LEFT

## 2022-10-04 ASSESSMENT — PAIN - FUNCTIONAL ASSESSMENT: PAIN_FUNCTIONAL_ASSESSMENT: NONE - DENIES PAIN

## 2022-10-04 ASSESSMENT — PAIN DESCRIPTION - DESCRIPTORS: DESCRIPTORS: ACHING;CRAMPING

## 2022-10-04 NOTE — CARE COORDINATION
Social work / Discharge Planning:        Patient is a Medicaid bed hold from [facility]. No precert needed to return. Will need negative covid result on day of discharge. LENO and transport form completed.     Electronically signed by JADEN Luna on 10/4/2022 at 1:48 PM

## 2022-10-04 NOTE — PROGRESS NOTES
10/04/22 0326   NIV Type   $NIV $Daily Charge   Skin Protection for O2 Device Yes   Mode Biphasic   Mask Type Full face mask   Mask Size Small   Settings/Measurements   IPAP 15 cmH20   CPAP/EPAP 6 cmH2O   Vt (Measured) 395 mL   Rate Ordered 14   Resp 27   FiO2  40 %   Mask Leak (lpm) 28 lpm   Comfort Level Good   Using Accessory Muscles No   SpO2 97   Patient Observation   Observations red outlet ox in room   Alarm Settings   Alarms On Y

## 2022-10-04 NOTE — PROGRESS NOTES
Physical Therapy  Facility/Department: [facility]  Physical Therapy Initial Assessment    Name: Omer Fontaine  : 1955  MRN: [id number]  Date of Service: 10/4/2022          Patient Diagnosis(es): The primary encounter diagnosis was COPD exacerbation ([address] A diagnosis of Acute respiratory failure with hypercapnia (HCC) was also pertinent to this visit. Past Medical History:  has a past medical history of (HFpEF) heart failure with preserved ejection fraction ([address] Anal fissure, Anemia, Anxiety and depression, Arthritis, Asthma, Atrial fibrillation ([address] Blood transfusion, CHF (congestive heart failure) ([address] COPD (chronic obstructive pulmonary disease) ([address] Disc disorder, DM2 (diabetes mellitus, type 2) ([address] Fall due to stumbling, GERD (gastroesophageal reflux disease), History of blood transfusion, Hx of blood clots, Hyperlipidemia, Hypertension, Inappropriate sinus tachycardia, LVH (left ventricular hypertrophy), Lymphadenitis, chronic, Occipital neuralgia, Respiratory acidosis, and Sinus tachycardia. Past Surgical History:  has a past surgical history that includes Tonsillectomy (); Appendectomy ();  section ();  section ();  section (); Anus surgery (2006); Upper gastrointestinal endoscopy (2004); Colonoscopy (2004); Colonoscopy (2006); anoscopy (10/25/2006); anoscopy (2007); anoscopy (2007); Colonoscopy (3/23/2012); Anus surgery (2012); Upper gastrointestinal endoscopy (N/A, 2018); Upper gastrointestinal endoscopy (N/A, 2018); pr debridement, skin, sub-q tissue,muscle,=<20 sq cm (Left, 2018); Endoscopy, colon, diagnostic; fracture surgery; joint replacement (); Insert Midline Catheter (3/18/2022); and 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          Requires PT Follow-Up: Yes     Evaluating Therapist: Joni Horsfall, PT     Referring Provider:  RAUL Hilliard - CNP    PT order : PT eval and treat     Room #: 942  DIAGNOSIS: The primary encounter diagnosis was COPD exacerbation ([address] A diagnosis of Acute respiratory failure with hypercapnia (HCC) was also pertinent to this visit. PRECAUTIONS: falls , O2     Social:  Pt admitted from SNF  Prior to admission : pt provided conflicting reports of PLOF. Questionable historian      Initial Evaluation  Date:  10/4/2022  Treatment      Short Term/ Long Term   Goals   Was pt agreeable to Eval/treatment? Yes      Does pt have pain? Bed Mobility  Rolling: min assist   Supine to sit: min assist   Sit to supine:  NT   Scooting:  min assist   SBA    Transfers Sit to stand:  min assist   Stand to sit: min assist   Stand pivot: min assist    SBA    Ambulation     7 feet forward and backward, 4 feet side stepping with ww  with  min assist    50  feet with  ww  with  SBA        Stair negotiation: ascended and descended NT   N/A    LE ROM  WFL     LE strength  4-/ 5   4+/ 5    AM- PAC RAW score  16/ 24            Pt is alert and Oriented x  3 . Some confusion noted     Balance:  min assist . Fall risk due to  decreased strength, balance, and safety awareness   Endurance: decreased   Bed/Chair alarm:  yes      ASSESSMENT  Pt displays functional ability as noted in the objective portion of this evaluation. Conditions Requiring Skilled Therapeutic Intervention:    [x]Decreased strength     []Decreased ROM  [x]Decreased functional mobility  [x]Decreased balance   [x]Decreased endurance   []Decreased posture  []Decreased sensation  []Decreased coordination   []Decreased vision  [x]Decreased safety awareness   []Increased pain         Treatment/Education:    Pt in bed  upon arrival ; agreeable to PT. Pt sleeping and difficult to awake initially, but improved as session progressed. Mobility as above. Cues needed for technique with bed mobility, hand placement with transfers.             Pt educated on fall risk, safety with mobility        Patient response to education:   Pt verbalized understanding Pt demonstrated skill Pt requires further education in this area   x  With cues   x       Comments:  Pt left  in chair after session, with call light in reach. Waffle cushion and alarm placed in chair       Rehab potential is Good for reaching above PT goals. Pts/ family goals   1. None stated    Patient and or family understand(s) diagnosis, prognosis, and plan of care. -  yes ? PLAN  PT care will be provided in accordance with the objectives noted above. Whenever appropriate, clear delegation orders will be provided for nursing staff. Exercises and functional mobility practice will be used as well as appropriate assistive devices or modalities to obtain goals. Patient and family education will also be administered as needed. PLAN OF CARE:    Current Treatment Recommendations     [x] Strengthening to improve independence with functional mobility   [] ROM to improve independence with functional mobility   [x] Balance Training to improve static/dynamic balance and to reduce fall risk  [x] Endurance Training to improve activity tolerance during functional mobility   [x] Transfer Training to improve safety and independence with all functional transfers   [x] Gait Training to improve gait mechanics, endurance and assess need for appropriate assistive device  [] Stair Training in preparation for safe discharge home and/or into the community   [x] Positioning to prevent skin breakdown and contractures  [x] Safety and Education Training   [x] Patient/Caregiver Education   [] HEP  [] Other     Frequency of treatments will be 2-5x/week x  7-10  days.     Time in: 1029   Time out:  1042       Evaluation Time includes thorough review of current medical information, gathering information on past medical history/social history and prior level of function, completion of standardized testing/informal observation of tasks, assessment of data and education on plan of care and goals.    CPT codes:  [x] Low Complexity PT evaluation 74442  [] Moderate Complexity PT evaluation 75558  [] High Complexity PT evaluation 66662  [] PT Re-evaluation 41960  [] Gait training 56096  minutes  [] Therapeutic activities 33559  minutes  [] Therapeutic exercises 87725  minutes  [] Neuromuscular reeducation 65717  minutes       [address] number:  PT 9134

## 2022-10-04 NOTE — PROGRESS NOTES
Occupational Therapy    OCCUPATIONAL THERAPY INITIAL EVALUATION    BON Jitendra Plasencia [place], [place]         [id number]                                                  Patient Name: Trinh Mason    MRN: [id number]    : 1955    Room: [address] [place] [address]      Evaluating OT: Benoit Raya OTR/L   [id number]      Referring RAUL Schrader CNP    Specific Provider Orders/Date:OT eval and treat 10/4/2022      Diagnosis:  COPD exacerbation ([address] [J44.1]  Acute and chronic respiratory failure with hypercapnia ([address] [J96.22]  Acute respiratory failure with hypercapnia ([address] [J96.02]     Pertinent Medical History: asthma, A fib, CHF, COPD, DM,    Precautions:  Fall Risk, O2     Assessment of current deficits    [x] Functional mobility  [x]ADLs  [x] Strength               []Cognition    [x] Functional transfers   [x] IADLs         [x] Safety Awareness   [x]Endurance    [] Fine Coordination              [x] Balance      [] Vision/perception   []Sensation     []Gross Motor Coordination  [] ROM  [] Delirium                   [] Motor Control     OT PLAN OF CARE   OT POC based on physician orders, patient diagnosis and results of clinical assessment    Frequency/Duration  1-3 days/wk for 2 weeks PRN   Specific OT Treatment Interventions to include:   ADL retraining/adapted techniques and AE recommendations to increase functional independence within precautions                    Energy conservation techniques to improve tolerance for selfcare routine   Functional transfer/mobility training/DME recommendations for increased independence, safety and fall prevention         Patient/family education to increase safety and functional independence             Environmental modifications for safe mobility and completion of ADLs                             Therapeutic activity to improve functional performance during ADLs. Therapeutic exercise to improve tolerance and functional strength for ADLs    Balance retraining/tolerance tasks for facilitation of postural control with dynamic challenges during ADLs . Positioning to improve functional independence      Recommended Adaptive Equipment: TBD     SOCIAL:  patient  from SNF, walks short distances with walker     Pain Level: no pain ;   Cognition: A&O: pleasant, following commands, conversing    Memory:  fair+   Sequencing:  fair+   Problem solving:  fair    Judgement/safety:  fair      Functional Assessment:  AM-PAC Daily Activity Raw Score: 15/24   Initial Eval Status  Date: 10/4/22 Treatment Status  Date: STGs = LTGs  Time frame: 10-14 days   Feeding Independent      Grooming Min A, seated   Supervision    UB Dressing Min A   Supervision    LB Dressing Mod A   Min A    Bathing Mod A   Min A    Toileting Min A   SBA    Bed Mobility  Min A  Supine to sit   SBA    Functional Transfers Min A  Sit - stand from bed   CGA/SBA    Functional Mobility Min A,w/walker   Steps from bed to chair   CGA/SBA  with good tolerance    Balance Sitting:     Static:  supervision     Dynamic:Min A   Standing: Min A  Supervision/SBA - standing   CG/SBA - standing    Activity Tolerance No SOB   Good  with ADL activity    Visual/  Perceptual Glasses: none by bedside                 Hand Dominance right   AROM (PROM) Strength Additional Info:    RUE  WFL WFL good  and wfl FMC/dexterity noted during ADL tasks       LUE WF WFL good  and wfl FMC/dexterity noted during ADL tasks       Hearing: [facility]  Sensation:  No c/o numbness or tingling   Tone: WFL  Edema: none observed     Comments: Upon arrival patient lying in bed . At end of session, patient sitting in chair with call light and phone within reach, all lines and tubes intact. *ALARM ON     Overall patient demonstrated  decreased independence and safety during completion of ADL/functional transfer/mobility tasks.   Pt would benefit from continued skilled OT to increase safety and independence with completion of ADL/IADL tasks for functional independence and quality of life. Rehab Potential: good for established goals     Patient / Family Goal: none stated       Patient and/or family were instructed on functional diagnosis, prognosis/goals and OT plan of care. Demonstrated fair + understanding. Eval Complexity: Low    Time In: 1032  Time Out: 1045      Min Units   OT Eval Low 97165 x  1   OT Eval Medium 65205      OT Eval High 75719      OT Re-Eval [id number]       Therapeutic Ex 88943      Therapeutic Activities 46219      ADL/Self Care 96748       Orthotic Management 22478       Manual 96618     Neuro Re-Ed 04676       Non-Billable Time          Evaluation Time additionally includes thorough review of current medical information, gathering information on past medical history/social history and prior level of function, interpretation of standardized testing/informal observation of tasks, assessment of data and development of plan of care and goals.             Lucy Martinez  OTR/L  OT [id number]

## 2022-10-04 NOTE — PLAN OF CARE
Patient's chart updated to reflect:      . - HF care plan, HF education points and HF discharge instructions.  -Orders: 2 gram sodium diet, daily weights, I/O.  -PCP and cardiology follow up appointments to be scheduled within 7 days of hospital discharge. -CHF education session will be provided to the patient prior to hospital discharge.     Russ Harper RN BSN  Heart Failure Navigator

## 2022-10-04 NOTE — PLAN OF CARE
Problem: Discharge Planning  Goal: Discharge to home or other facility with appropriate resources  Outcome: Progressing     Problem: Chronic Conditions and Co-morbidities  Goal: Patient's chronic conditions and co-morbidity symptoms are monitored and maintained or improved  Outcome: Progressing     Problem: ABCDS Injury Assessment  Goal: Absence of physical injury  Outcome: Progressing     Problem: Safety - Adult  Goal: Free from fall injury  Outcome: Progressing

## 2022-10-04 NOTE — ED PROVIDER NOTES
HPI:  10/3/22,   Time: 10:22 PM EDT       Shayy Wynn is a 74 y.o. female presenting to the ED for shortness of breath, beginning 2 hours ago. The complaint has been intermittent, moderate in severity, and worsened by nothing. Patient is a 55-year-old female comes in from nursing home. She is ambulatory with assistance and a walker at baseline. She is a history of lymphedema CHF COPD and A. fib. She is anticoagulated on Eliquis she is on Bumex at baseline. She receives daily nebulizer treatments at facility. Patient states she has not had any recent cough or fever no chest pain. She has a couple hours ago she started to feel more short of breath so she was sent in for further evaluation. EMS reported she was hypoxic and placed on a nonrebreather. She was in no distress on arrival.  We checked her with a good waveform and she was 100% on her baseline 4 L nasal cannula. She is chronically 24 hours a day on 4 L nasal cannula due to COPD and CHF. She is a former smoker. Patient denies any complaints at this time she speak in full sentences. No chest pain or shortness of breath no abdominal pain no back pain. Patient denies any leg pain or increased leg swelling. Chronic lymphedema.       Review of Systems:   Pertinent positives and negatives are stated within HPI, all other systems reviewed and are negative.    --------------------------------------------- PAST HISTORY ---------------------------------------------  Past Medical History:  has a past medical history of (HFpEF) heart failure with preserved ejection fraction ([address] Anal fissure, Anemia, Anxiety and depression, Arthritis, Asthma, Atrial fibrillation ([address] Blood transfusion, CHF (congestive heart failure) ([address] COPD (chronic obstructive pulmonary disease) ([facility] [address] Disc disorder, DM2 (diabetes mellitus, type 2) ([facility] [address] Fall due to stumbling, GERD (gastroesophageal reflux disease), History of blood transfusion, Hx of blood clots, Hyperlipidemia, Hypertension, Inappropriate sinus tachycardia, LVH (left ventricular hypertrophy), Lymphadenitis, chronic, Occipital neuralgia, Respiratory acidosis, and Sinus tachycardia. Past Surgical History:  has a past surgical history that includes Tonsillectomy (); Appendectomy ();  section ();  section ();  section (); Anus surgery (2006); Upper gastrointestinal endoscopy (2004); Colonoscopy (2004); Colonoscopy (2006); anoscopy (10/25/2006); anoscopy (2007); anoscopy (2007); Colonoscopy (3/23/2012); Anus surgery (2012); Upper gastrointestinal endoscopy (N/A, 2018); Upper gastrointestinal endoscopy (N/A, 2018); pr debridement, skin, sub-q tissue,muscle,=<20 sq cm (Left, 2018); Endoscopy, colon, diagnostic; fracture surgery; joint replacement (); Insert Midline Catheter (3/18/2022); and 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 Social History:  reports that she quit smoking about 17 years ago. Her smoking use included cigarettes. She started smoking about 42 years ago. She has a 37.50 pack-year smoking history. She has never used smokeless tobacco. She reports that she does not currently use alcohol. She reports that she does not currently use drugs. Family History: family history includes Colon Cancer in her father and sister; Diabetes in her father, paternal aunt, paternal aunt, paternal uncle, paternal uncle, and sister; Heart Disease in her mother; Other in her father and sister. The patients home medications have been reviewed. Allergies: Statins    ---------------------------------------------------PHYSICAL EXAM--------------------------------------    Constitutional/General: Alert and oriented x3, well appearing, non toxic in NAD; obese; patient is able speak in full sentences.   Head: Normocephalic and atraumatic  Eyes: PERRL, EOMI, conjunctive normal, sclera non icteric  Mouth: Oropharynx clear, handling secretions, no trismus, no asymmetry of the posterior oropharynx or uvular edema  Neck: Supple, full ROM, non tender to palpation in the midline, no stridor, no crepitus, no meningeal signs  Respiratory: Lungs with good air movement bilaterally scattered expiratory wheezes no rales or rhonchi slightly decreased breath sounds at the bases. Able to speak in full sentences. No accessory muscle use or retractions. Not in respiratory distress  Cardiovascular:  Regular rate. Regular rhythm. No murmurs, gallops, or rubs. 2+ distal pulses  Chest: No chest wall tenderness  GI:  Abdomen Soft, Non tender, Non distended. +BS. No organomegaly, no palpable masses,  No rebound, guarding, or rigidity. Musculoskeletal: Moves all extremities x 4. Warm and well perfused; chronic bilateral lymphedema of the lower extremities. Capillary refill <3 seconds  Integument: skin warm and dry. No rashes. Lymphatic: no lymphadenopathy noted  Neurologic: GCS 15, no focal deficits, symmetric strength 5/5 in the upper and lower extremities bilaterally  Psychiatric: Normal Affect    -------------------------------------------------- RESULTS -------------------------------------------------  I have personally reviewed all laboratory and imaging results for this patient. Results are listed below.      LABS:  Results for orders placed or performed during the hospital encounter of 10/03/22   COVID-19, Rapid    Specimen: Nasopharyngeal Swab   Result Value Ref Range    SARS-CoV-2, NAAT Not Detected Not Detected   Rapid influenza A/B antigens    Specimen: Nasopharyngeal   Result Value Ref Range    Influenza A by PCR Not Detected Not Detected    Influenza B by PCR Not Detected Not Detected   CBC with Auto Differential   Result Value Ref Range    WBC 6.3 4.5 - 11.5 E9/L    RBC 3.58 3.50 - 5.50 E12/L    Hemoglobin 9.3 (L) 11.5 - 15.5 g/dL    Hematocrit 31.0 (L) 34.0 - 48.0 %    MCV 86.6 80.0 - 99.9 fL    MCH 26.0 26.0 - 35.0 pg    MCHC 30.0 (L) 32.0 - 34.5 %    RDW 13.9 11.5 - 15.0 fL    Platelets 004 240 - 117 E9/L    MPV 10.0 7.0 - 12.0 fL    Neutrophils % 62.4 43.0 - 80.0 %    Immature Granulocytes % 0.2 0.0 - 5.0 %    Lymphocytes % 25.4 20.0 - 42.0 %    Monocytes % 6.1 2.0 - 12.0 %    Eosinophils % 5.6 0.0 - 6.0 %    Basophils % 0.3 0.0 - 2.0 %    Neutrophils Absolute 3.90 1.80 - 7.30 E9/L    Immature Granulocytes # 0.01 E9/L    Lymphocytes Absolute 1.59 1.50 - 4.00 E9/L    Monocytes Absolute 0.38 0.10 - 0.95 E9/L    Eosinophils Absolute 0.35 0.05 - 0.50 E9/L    Basophils Absolute 0.02 0.00 - 0.20 E9/L   Comprehensive Metabolic Panel   Result Value Ref Range    Sodium 138 132 - 146 mmol/L    Potassium 3.8 3.5 - 5.0 mmol/L    Chloride 91 (L) 98 - 107 mmol/L    CO2 39 (H) 22 - 29 mmol/L    Anion Gap 8 7 - 16 mmol/L    Glucose 287 (H) 74 - 99 mg/dL    BUN 22 6 - 23 mg/dL    Creatinine 0.7 0.5 - 1.0 mg/dL    GFR Non-African American >60 >=60 mL/min/1.73    GFR African American >60     Calcium 9.4 8.6 - 10.2 mg/dL    Total Protein 6.1 (L) 6.4 - 8.3 g/dL    Albumin 3.5 3.5 - 5.2 g/dL    Total Bilirubin <0.2 0.0 - 1.2 mg/dL    Alkaline Phosphatase 93 35 - 104 U/L    ALT 7 0 - 32 U/L    AST 10 0 - 31 U/L   Troponin   Result Value Ref Range    Troponin, High Sensitivity 17 (H) 0 - 9 ng/L   Brain Natriuretic Peptide   Result Value Ref Range    Pro- (H) 0 - 125 pg/mL   Blood Gas, Arterial   Result Value Ref Range    Date Analyzed 20221004     Time Analyzed 0116     Source: Blood Arterial     pH, Blood Gas 7.368 7.350 - 7.450    PCO2 81.6 (HH) 35.0 - 45.0 mmHg    PO2 88.2 75.0 - 100.0 mmHg    HCO3 45.9 (H) 22.0 - 26.0 mmol/L    B.E. 17.6 (H) -3.0 - 3.0 mmol/L    O2 Sat 96.2 92.0 - 98.5 %    O2Hb 94.8 94.0 - 97.0 %    COHb 1.2 0.0 - 1.5 %    MetHb 0.3 0.0 - 1.5 %    O2 Content 13.1 mL/dL    HHb 3.7 0.0 - 5.0 %    tHb (est) 9.7 (L) 11.5 - 16.5 g/dL    Mode NC-4 L     Date Of Collection      Time Collected      Pt Temp 37.0 C     ID [id number] Lab [id number]     Critical(s) Notified Handed report to /RN    Blood Gas, Arterial   Result Value Ref Range    Date Analyzed 20221004     Time Analyzed 0118     Source: Blood Arterial     pH, Blood Gas 7.373 7.350 - 7.450    PCO2 73.1 (HH) 35.0 - 45.0 mmHg    PO2 90.5 75.0 - 100.0 mmHg    HCO3 41.6 (H) 22.0 - 26.0 mmol/L    B.E. 14.1 (H) -3.0 - 3.0 mmol/L    O2 Sat 96.6 92.0 - 98.5 %    O2Hb 94.6 94.0 - 97.0 %    COHb 1.8 (H) 0.0 - 1.5 %    MetHb 0.3 0.0 - 1.5 %    O2 Content 12.4 mL/dL    HHb 3.3 0.0 - 5.0 %    tHb (est) 9.2 (L) 11.5 - 16.5 g/dL    Mode nc4l     Comment reran abg     Date Of Collection      Time Collected      Pt Temp 37.0 C     ID [id number]     Lab [id number]     Critical(s) Notified Handed report to Dr/RN    Troponin   Result Value Ref Range    Troponin, High Sensitivity 16 (H) 0 - 9 ng/L       RADIOLOGY:  Interpreted by Radiologist.  XR CHEST PORTABLE   Final Result   No acute process. EKG:  EKG shows sinus tachycardia at 104 beats minute no signs of any ST changes no ST elevation . No change other than increased rate from prior EKG. EKG interpreted myself.      ------------------------- NURSING NOTES AND VITALS REVIEWED ---------------------------   The nursing notes within the ED encounter and vital signs as below have been reviewed by myself. BP (!) 174/81   Pulse 99   Temp 98.6 °F (37 °C) (Oral)   Resp 22   Ht 5' 4\" (1.626 m)   Wt 282 lb 3.2 oz (128 kg)   LMP  (LMP Unknown)   SpO2 95%   BMI 48.44 kg/m²   Oxygen Saturation Interpretation: Abnormal - but at baseline; percent on baseline 4 L nasal cannula. The patients available past medical records and past encounters were reviewed.         ------------------------------ ED COURSE/MEDICAL DECISION MAKING----------------------  Medications   ipratropium-albuterol (DUONEB) nebulizer solution 3 ampule (3 ampules Inhalation Given 10/3/22 6604)   methylPREDNISolone sodium (SOLU-MEDROL) injection 125 mg (125 mg IntraVENous Given 10/4/22 0100)   gabapentin (NEURONTIN) capsule 300 mg (300 mg Oral Given 10/4/22 0229)   acetaminophen (TYLENOL) tablet 1,000 mg (1,000 mg Oral Given 10/4/22 0229)         ED COURSE:  ED Course as of 10/04/22 1819   Tue Oct 04, 2022   0305 Pressure has been called to start the patient on BiPAP due to CO2 retention. [address]   [id number] Patient has been started on BiPAP. Respiratory at bedside we are adjusting settings at this time. [KK]   [id number] Patient in no distress. Patient is a poor historian. She states she has been on BiPAP before but does not wear it every day. We confirm with facility that she does not wear it at the facility. Patient is slightly more confused than her baseline not able to give us a good history but is AAO x3. [address] Spoke to [id number] practitioner with the [facility]. She accepted the patient to Dr. Jann Gosselin. Patient will be admitted to their service pulmonary consult in house as needed. [KK]      ED Course User Index  [KK] Lillian Starks MD       Medical Decision Making:    Patient 69-year-old female history of CHF COPD CHF A. fib. Chronically on Bumex and Eliquis. She states began to feel more short of breath 2 hours ago at nursing home. She denies any chest pain denies any recent cough or fever. Patient states symptoms have improved. Thought to be hypoxic by EMS. Patient is not hypoxic to 100% on her baseline 4 L nasal cannula here in the emergency department. Patient given DuoNeb treatment steroids do chest x-ray ABG lab work    Differential diagnosis includes hypoxia COPD exacerbation CHF ACS dysrhythmia MI pneumonia        This patient has remained hemodynamically stable and been closely monitored during their ED course. EKG shows sinus tachycardia at 104 beats minute no signs of any ST changes no ST elevation. Patient was not initially hypoxic stable on her baseline 4 L. Patient was given 3 DuoNeb treatments as well as IV Solu-Medrol.   She is chronically anticoagulated on Eliquis. First troponin was 17-second was 16. Delta of only 1. ABG showed pH of 7.37. O2 sat was 96. PO2 was 90. The PCO2 was elevated at 73. Patient's baseline is between 40 and 50. She likely chronically retaining. We were able to get a hold of someone at the nursing home that does confirm the patient is not normally on CPAP or BiPAP. I called respiratory and started the patient on BiPAP to decrease CO2 level. CBC was normal.Chemistry was normal other than a CO2 was increased at 39. BNP was 179 COVID and flu were negative chest x-ray showed no acute process. Patient will be admitted to Dr. Serenity Stroud monitored bed pulmonary consult in house patient stable on BiPAP mentating and in no distress. Tolerating BiPAP well stable for admission to monitored bed for acute on chronic respiratory failure with hypercapnia secondary to chronic COPD. Reevaluations:             Re-evaluation. Patients symptoms are improving      Counseling: The emergency provider has spoken with the patient and discussed todays results, in addition to providing specific details for the plan of care and counseling regarding the diagnosis and prognosis. Questions are answered at this time and they are agreeable with the plan. CRITICAL CARE:  40 MINUTES. Please note that the withdrawal or failure to initiate urgent interventions for this patient would likely result in a life threatening deterioration or permanent disability. Accordingly this patient received the above mentioned time, excluding separately billable procedures. --------------------------------- IMPRESSION AND DISPOSITION ---------------------------------    IMPRESSION  1. COPD exacerbation ([address]    2. Acute respiratory failure with hypercapnia (HCC)        DISPOSITION  Disposition: Admit to telemetry  Patient condition is fair    NOTE: This report was transcribed using voice recognition software.  Every effort was made to ensure accuracy; however, inadvertent computerized transcription errors may be present        Kimberly Hwang MD  10/04/22 Tori Sultana

## 2022-10-04 NOTE — DISCHARGE INSTRUCTIONS
***HEART FAILURE - CONGESTIVE HEART FAILURE***  DISCHARGE INSTRUCTIONS:  GUIDELINES TO FOLLOW AT [facility]/LTAC/SNF/ Assisted Living    No future appointments. MEDICATIONS:  Please notify the doctor if patient is not able to take their medications or if medications are being held for any reasons (such as low blood pressure ect.)  Do not give the patient ibuprofen (Advil or Motrin), naproxen (Aleve) without talking to the doctor first. This could make their heart failure worse. WEIGHT MONITORING:   Weigh patient every day in the morning after they void (If patient is able to stand, please get a standing weight.)   Notify the doctor of a weight gain of 3 pounds or more in 1 day   OR  a total of 5 pounds or more in 1 week             DIET   Cardiac heart healthy diet:  Low sodium diet: no  more than 2,000mg (2 grams) of salt / sodium per day (which equals to a little less than  a teaspoon of salt)/ Cardiac Diet: Low saturated / low trans fat, no added salt, caffeine restricted    If patient is there for rehab and will be returning home in the near future; reinforce with the patient and the family to follow a low sodium diet (2,000 mg)- avoid using salt at the table, avoid / limit use of canned soups, processed / packaged foods, salted snacks, olives and pickles. Do not use a salt substitute without checking with the doctor. (Mrs. Ba Moreno is safe to use).        NOTIFY THE DOCTOR THE FIRST DAY OF ONSET OF ANY OF THESE   SYMPTOMS:   Weight gain of 3 pounds or more in 1 day         OR 5 pounds or more in one week  More shortness of breath  More swelling in stomach, legs, ankles or feet  Feeling more tired, No energy  Dry hacky cough  Dizziness  More chest pain / discomfort  Hard time breathing laying down

## 2022-10-04 NOTE — PROGRESS NOTES
P Quality Flow/Interdisciplinary Rounds Progress Note        Quality Flow Rounds held on October 4, 2022    Disciplines Attending:  Bedside Nurse, , , and Nursing Unit [address] Harish was admitted on 10/3/2022  9:51 PM    Anticipated Discharge Date:       Disposition:    Kavon Score:       Readmission Risk              Risk of Unplanned Readmission:  13           Discussed patient goal for the day, patient clinical progression, and barriers to discharge. The following Goal(s) of the Day/Commitment(s) have been identified:  Wean steroids, PT/OT.       Brain Putnam RN  October 4, 2022

## 2022-10-04 NOTE — DISCHARGE INSTR - COC
Continuity of Care Form    Patient Name: Omer Fontaine   :  1955  MRN:  [id number]    Admit date:  10/3/2022  Discharge date:  10/6/22    Code Status Order: Full Code   Advance Directives:     Admitting Physician:  No admitting provider for patient encounter. PCP: Salo Garrido DO    Discharging Nurse: [place] Unit/Room#: 7637/9202-S  Discharging Unit Phone Number: [phone]    Emergency Contact:   Extended Emergency Contact Information  Primary Emergency Contact: [place] [address], [address] Phone: [phone]  Mobile Phone: [phone]  Relation: Child   needed? No  Secondary Emergency Contact: Gem Betancur  [place] Phone: [phone]  Mobile Phone: [phone]  Relation: Other  Preferred language: English   needed? No    Past Surgical History:  Past Surgical History:   Procedure Laterality Date    ANOSCOPY  10/25/2006    injection of anal sphincter with Botox, Franklyn Ortiz/Timmy, [facility]    ANOSCOPY  2007    injection of anal sphincter with Botox, Dr. Michael Coleman, [facility]    ANOSCOPY  2007    injection of anal sphincter with Botox, Franklyn Shanks [facility]    ANUS SURGERY  2006    Lateral sphincterotomy for chronic anal fissure, Dr. Ivan Byrd, [address] [place] [address]  2012    anal exam and injection of Botox 100 units into anal sphincter, Laila Shanks, [facility]    COLONOSCOPY  2004    cecal polyp, bx (no pathologic changes), Dr. Alesia Bosworth, [facility]    COLONOSCOPY  2006    snare polypectomy terminal ileum polyp (granulation tissue polyp) and anal polyp (inflammatory/papilla), Dr. Michael Coleman, [facility]    COLONOSCOPY  3/23/2012    bx/cauterization proximal ascending colon polyp, injection of anal sphincter with Botox, Dr. Michael Coleman, [facility]    ENDOSCOPY, COLON, DIAGNOSTIC      FRACTURE SURGERY      R leg fracture with surgery for repair    INSERT MIDLINE CATHETER  3/18/2022         JOINT REPLACEMENT  2003    L knee    PICC LINE INSERTION NURSE  3/20/2022         ID DEBRIDEMENT, SKIN, SUB-Q TISSUE,MUSCLE,=<20 SQ CM Left 11/30/2018    LEFT LEG EXCISIONAL  DEBRIDEMENT WITH TISSUE BIOPSY performed by Vanessa Edouard DPM at [facility]  1/20/2004    gastritis, bx (no H pylori), Dr. Tyron Partida, [place] [address] ENDOSCOPY N/A 6/1/2018    EGD BIOPSY performed by Ever Velasquez MD at [address] [place] [address] N/A 11/9/2018    EGD BIOPSY performed by Ever Velasquez MD at [facility] ENDOSCOPY       Immunization History:   Immunization History   Administered Date(s) Administered    COVID-19, MODERNA BLUE border, Primary or Immunocompromised, (age 12y+), IM, 100 mcg/0.5mL 11/23/2021, 12/20/2021    Influenza 10/26/2012    Influenza Virus Vaccine 10/08/2013, 09/04/2015    Influenza, FLUARIX, FLULAVAL, [address] (age 10 mo+) AND AFLURIA, (age 1 y+), PF, 0.5mL 10/24/2016, 12/14/2017    Influenza, High Dose (Fluzone 65 yrs and older) 11/07/2019    Pneumococcal Polysaccharide ([id number]) 09/04/2015    Zoster Live (Zostavax) 01/02/2015       Active Problems:  Patient Active Problem List   Diagnosis Code    Uncontrolled diabetes mellitus [id number]    Depression F32. 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 A    Chronic anticoagulation Z79.01    COPD exacerbation ([facility]) J44.1    Acute on chronic respiratory failure with hypoxia ([facility]) J96.21    Blood loss anemia D50.0    Cellulitis of left lower extremity L03.116    Poorly controlled diabetes mellitus ([facility]) E11.65    Lymphedema I89.0    Septicemia ([facility]) B25.9    Acute diastolic congestive heart failure ([facility]) I50.31    Anemia D64.9    Morbid obesity with BMI of 45.0-49.9, adult ([facility]) E66.01, Z68.42    Acute hypoxemic respiratory failure ([facility]) G03.55    Diastolic CHF, acute on chronic ([facility]) I50.33    Cellulitis L03.90    Stress fracture of right fibula M84.363A    Atrial fibrillation, currently in sinus rhythm Z86.79    Gastroesophageal reflux disease without esophagitis K21.9    Ulcers of both lower legs ([facility]) L97.919, L97.929    Chronic respiratory failure with hypoxia ([facility]) J96.11    Abscess and cellulitis of gluteal region L02.31, L03.317    CHF (congestive heart failure), NYHA class I, acute on chronic, combined ([facility]) I50.43    Adrenal mass ([facility]) E27.8    Hyperglycemia R73.9    Atrial fibrillation ([facility]) I48.91    Infection due to extended-spectrum beta-lactamase-producing Escherichia coli A49.8, Z16.12    Bilateral cellulitis of lower leg L03.116, L03.115    Ambulatory dysfunction R26.2    Chronic respiratory failure with hypercapnia ([facility]) J96.12    Moderate pulmonary hypertension ([facility]) I27.20    QT prolongation R94.31    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    Sepsis ([facility]) A41.9    Acute and chronic respiratory failure with hypercapnia ([facility]) J96.22    DM (diabetes mellitus) ([facility] E11.9       Isolation/Infection:   Isolation            No Isolation          Patient Infection Status       Infection Onset Added Last Indicated Last Indicated By Review Planned Expiration Resolved Resolved By    None active    Resolved    COVID-19 (Rule Out) 10/03/22 10/03/22 10/04/22 COVID-19, Rapid (Ordered)   10/04/22 Rule-Out Test Resulted    COVID-19 (Rule Out) 04/10/22 04/10/22 04/10/22 COVID-19, Rapid (Ordered)   04/10/22 Rule-Out Test Resulted    COVID-19 (Rule Out) 04/08/22 04/08/22 04/08/22 COVID-19, Rapid (Ordered)   04/08/22 Rule-Out Test Resulted    COVID-19 (Rule Out) 03/20/22 03/20/22 03/20/22 Respiratory Panel, Molecular, with COVID-19 (Restricted: peds pts or suitable admitted adults) (Ordered)   03/20/22 Rule-Out Test Resulted    MRSA 03/08/22 03/10/22 03/08/22 Culture, MRSA, Screening   10/04/22 Froylan Bowen    MRSA nares 3/8/2022  MRSA wound right leg 3/8/2022    COVID-19 (Rule Out) 03/08/22 03/08/22 03/08/22 Respiratory Panel, Molecular, with COVID-19 (Restricted: peds pts or suitable admitted adults) (Ordered)   03/08/22 Rule-Out Test Resulted    COVID-19 (Rule Out) 03/08/22 03/08/22 03/08/22 COVID-19, Rapid (Ordered)   03/08/22 Rule-Out Test Resulted    COVID-19 (Rule Out) 08/18/21 08/18/21 08/18/21 Respiratory Panel, Molecular, with COVID-19 (Restricted: peds pts or suitable admitted adults) (Ordered)   08/18/21 Rule-Out Test Resulted    MRSA  07/12/21 07/12/21 Jose Dennis RN   03/08/22 Bruna Serrano RN    Wound-Right Leg skin 7/5/21. Contact isolation per Dr. To Navarro. MRSA 07/05/21 07/10/21 07/05/21 Culture, Wound   07/12/21 Jose Dennis RN    Wound-Right Leg skin 7/5/21. Contained within C/D/I dressing. Standard precautions indicated.     COVID-19 (Rule Out) 05/03/21 05/03/21 05/03/21 COVID-19, Rapid (Ordered)   05/03/21 Rule-Out Test Resulted    ESBL (Extended Spectrum Beta Lactamase) 04/29/21 05/02/21 04/29/21 Culture, Blood 2   07/07/21 BERNABE Lau blood 4/29/21    COVID-19 (Rule Out) 04/29/21 04/29/21 04/29/21 COVID-19, Rapid (Ordered)   04/29/21 Rule-Out Test Resulted COVID-19 (Rule Out) 01/13/21 01/13/21 01/13/21 Respiratory Panel, Molecular, with COVID-19 (Restricted: peds pts or suitable admitted adults) (Ordered)   01/14/21 Aracely Randall RN    COVID-19 (Rule Out) 01/12/21 01/12/21 01/12/21 COVID-19 (Ordered)   01/12/21 Rule-Out Test Resulted    COVID-19 (Rule Out) 06/02/20 06/02/20 06/02/20 COVID-19 (Ordered)   06/02/20 Rule-Out Test Resulted    COVID-19 (Rule Out) 05/29/20 05/29/20 05/29/20 COVID-19 (Ordered)   05/29/20 Rule-Out Test Resulted    ESBL (Extended Spectrum Beta Lactamase)  12/04/18 12/04/18 Antwan Botello RN   09/24/19 Antwan Botello RN    E. Coli, wound-leg,11/30/18            Nurse Assessment:  Last Vital Signs: /72   Pulse 92   Temp 98.3 °F (36.8 °C) (Axillary)   Resp 18   Ht 5' 4\" (1.626 m)   Wt 282 lb 3.2 oz (128 kg)   LMP  (LMP Unknown)   SpO2 97%   BMI 48.44 kg/m²     Last documented pain score (0-10 scale): Pain Level: 5  Last Weight:   Wt Readings from Last 1 Encounters:   10/03/22 282 lb 3.2 oz (128 kg)     Mental Status:  disoriented, oriented, and alert    IV Access:  - None    Nursing Mobility/ADLs:  Walking   Assisted  Transfer  Assisted  Bathing  Assisted  Dressing  Assisted  Toileting  Assisted  Feeding  Independent  Med Admin  Assisted  Med Delivery   whole    Wound Care Documentation and Therapy:  Wound 04/14/21 Leg Lower;Right (Active)   Number of days: 935       Wound 04/14/21 Leg Left; Lower (Active)   Number of days: 537       Wound 07/07/21 Leg Lower;Right (Active)   Number of days: 496       Wound 07/07/21 Leg Left; Lower (Active)   Number of days: 454       Wound 08/19/21 Sacrum redness (Active)   Number of days: 411       Wound 08/23/21 Leg Medial;Lower;Right (Active)   Number of days: 407       Wound 03/12/22 Coccyx Mid open area on coccyx (Active)   Number of days: 206       Wound 03/11/22 Nose Right (Active)   Number of days: 206        Elimination:  Continence:    Bowel: No  Bladder: No  Urinary Catheter: Removal Date 10/6/22    Colostomy/Ileostomy/Ileal Conduit: No       Date of Last BM: ***    Intake/Output Summary (Last 24 hours) at 10/4/2022 1348  Last data filed at 10/4/2022 0909  Gross per 24 hour   Intake --   Output 1175 ml   Net -1175 ml     No intake/output data recorded. Safety Concerns:     Sundowners Sundrome and History of Falls (last 30 days)    Impairments/Disabilities:      None    Nutrition Therapy:  Current Nutrition Therapy:   - Oral Diet:  Carb Control 4 carbs/meal (1800kcals/day), Cardiac, and Low Fat    Routes of Feeding: Oral  Liquids: Thin Liquids  Daily Fluid Restriction: no  Last Modified Barium Swallow with Video (Video Swallowing Test): not done    Treatments at the Time of Hospital Discharge:   Respiratory Treatments: [facility]  Oxygen Therapy:  is on oxygen at 3 L/min per nasal cannula. Ventilator:    - No ventilator support    Rehab Therapies: Physical Therapy and Occupational Therapy  Weight Bearing Status/Restrictions: No weight bearing restrictions  Other Medical Equipment (for information only, NOT a DME order):  walker  Other Treatments: None    Patient's personal belongings (please select all that are sent with patient):  None    RN SIGNATURE:  Electronically signed by Pratik Hernandez RN on 10/6/22 at 2:24 PM EDT    CASE MANAGEMENT/SOCIAL WORK SECTION    Inpatient Status Date:     Readmission Risk Assessment Score:  Readmission Risk              Risk of Unplanned Readmission:  19           Discharging to Facility/ Agency   Name: [facility] [address]   [facility], [address]  [facility]    Dialysis Facility (if applicable)   Name:  Address:  Dialysis Schedule:  Phone:  Fax:    / signature: Electronically signed by JADEN Dai on 10/4/22 at 1:49 PM EDT    PHYSICIAN SECTION    Prognosis: Fair    Condition at Discharge: Stable    Rehab Potential (if transferring to Rehab):  Fair    Recommended Labs or Other Treatments After Discharge: See Orders    Physician Certification: I certify the above information and transfer of Dennie Matas  is necessary for the continuing treatment of the diagnosis listed and that she requires Intermediate Nursing Care for greater 30 days.      Update Admission H&P: No change in H&P    PHYSICIAN SIGNATURE:  Dr Amy Kapoor

## 2022-10-04 NOTE — PROGRESS NOTES
Pt arrived on Bipap from ER with RT present. Pt transferred over to bed, Hover mat and clean pads under neath patient. Redness noted on Buttocks blanchable, no open wounds noted. Patient also has redness under breast folds/pannus. Patient was attached to telemetry and vitals were taken. Pt oriented to room, call light with in reach, blood sugar taken. Orders were released and report to day shift was given.

## 2022-10-04 NOTE — CONSULTS
CHF NURSE NAVIGATOR CONSULT NOTE:    Patient currently admitted with diagnosis of Diastolic heart failure. Patient was awake and alert, laying in bed during the consultation. She was engaged and asked appropriate questions throughout the education session. She is agreeable to heart failure education while here, self monitoring and low sodium diet once discharged. Scheduling with the CHF clinic No: she is not interested in the CHF clinic at this time. Maybe after cardio follow up . Future Appointments   Date Time Provider Jason Gallegoisti   10/27/2022  1:00 PM Ryan Bowen [facility]N - C[facility]       Barriers to Care:  Contributing risk factors for Heart Failure are identified as lack of education. The patient is ordered:  Diet: ADULT DIET; Regular; 4 carb choices (60 gm/meal); Low Fat/Low Chol/High Fiber/MAURI   Sodium controlled diet Yes  Fluid restriction daily ordered (fluid restriction recommended if patient is hyponatremic and/or diuretic is initiated or increased) No  FR:   Daily Weights: Patient Vitals for the past 96 hrs (Last 3 readings):   Weight   10/03/22 2209 282 lb 3.2 oz (128 kg)     I/O:   Intake/Output Summary (Last 24 hours) at 10/4/2022 1017  Last data filed at 10/4/2022 0909  Gross per 24 hour   Intake --   Output 1175 ml   Net -1175 ml              We reviewed the introduction to Heart Failure, the HF zones, signs and symptoms to report on day 1 of onset, medications, medication compliance, the importance of obtaining daily weights, following a low sodium diet, reading food labels for the sodium content, keeping physician appointments, and smoking cessation. We discussed writing / tracking daily weights on a calendar / log because a 5 pound gain in 1 week can sneak up if you are not tracking it. I advised patient they can reduce the risk for Heart Failure exacerbations by modifying / controlling the risk factors.  We discussed self-managed care which includes the following:  to take medications as prescribed, report any intolerable side effects of medications to the cardiologist / doctor, do not just stop taking the medication; follow a cardiac heart healthy / low sodium diet; weigh yourself daily, exercise regularly- per doctor recommendation and not to smoke or use an excess amount of alcohol. We discussed calling the cardiologist / doctor with a weight gain of 3 pounds in one day or a total of 5 pounds or more in one week. Also, if you should have a significant weight loss of 3# or more in one day to call the doctor, they may need to decrease or hold the diuretic dose. On days you feels nauseated and not eating / drinking, having emesis or diarrhea,  informed to call the cardiologist  / doctor, they may need to decrease or hold diuretic to avoid dehydration. I stressed the importance of informing their cardiologist the first day of onset of any of the signs and symptoms in the \"Yellow Zone\" of the HF Zones. Patient verbalizes understanding. Greater than 30 minutes was spent educating patient. The Heart Failure Booklet given to the patient with additional patient education addressing:  What is Heart Failure? Things You Can Do to Live Well with HF  How to Take Your Medications  How to Eat Less Salt  [place] its Salt?   Exercising Well with Heart Failure  Signs and symptoms of HF to report  Weight Yourself Each Day  Home Self Management- activity, weight tracking, taking medications as prescribed, meals /diet planning (sodium and fluid restriction), how to read food labels, keeping physician follow ups, smoking cessation, follow the Heart Failure Zones  The Heart Failure zones  Every Dose Every Day      Instructed  to call 911 if you have any of the following symptoms:       Struggling to breathe unrelieved with rest,     Having chest pain     Have confusion or cant think clearly          Gibson Lucero, RN BSN, RN  Heart Failure Navigator        CONGESTIVE HEART FAILURE (CHF) AHA GUIDELINES  (Must be completed for Primary Diagnosis CHF or History of CHF)    Discharge Plan:  I placed the Heart Failure Home Instructions in patient's discharge instructions. Per Heart Failure GWTG, the patient should have a follow-up appointment made within 7 days of discharge.     New Diagnosis No    ECHO Results most recent:  Lab Results   Component Value Date    LVEF 63 2021    LVEFMODE Echo 2018                                        Social History     Tobacco Use   Smoking Status Former    Packs/day: 1.50    Years: 25.00    Pack years: 37.50    Types: Cigarettes    Start date: 1979    Quit date: 2004    Years since quittin.8   Smokeless Tobacco Never   Tobacco Comments    Stopped smoking 16 years ago        Immunization History   Administered Date(s) Administered    COVID-19, MODERNA BLUE border, Primary or Immunocompromised, (age 12y+), IM, 100 mcg/0.5mL 2021, 2021    Influenza 10/26/2012    Influenza Virus Vaccine 10/08/2013, 2015    Influenza, FLUARIX, FLULAVAL, FLUZONE (age 10 mo+) AND AFLURIA, (age 1 y+), PF, 0.5mL 10/24/2016, 2017    Influenza, High Dose (Fluzone 65 yrs and older) 2019    Pneumococcal Polysaccharide ([id number]) 2015    Zoster Live (Zostavax) 2015      Angiotensin-Converting-Enzyme (ACE) inhibitor ordered:  [] Yes  [x] No (specify contraindication):    [] Contraindicated  [] Hypotensive patient who was at immediate risk of cardiogenic shock  [] Hospitalized patient who experienced marked azotemia  [] Other Contraindications  [] Not Eligible  [] Not Tolerant  [] Patient Reason  [] System Reason  [x] Other Reason    Angiotensin II receptor blockers (ARB) ordered:  [] Yes  [x] No (specify contraindication):    [] Contraindicated  [] Hypotensive patient who was at immediate risk of cardiogenic shock  [] Hospitalized patient who experienced marked azotemia  [x] Other Contraindications    ARNI - Angiotensin Receptor Neprilysin Inhibitor ordered:  [] Yes  [x] No (specify contraindication):    [] ACE inhibitor use within the prior 36 hours  [] Allergy  [] Hyperkalemia  [] Hypotension  [] Renal dysfunction defined as creatinine > 2.5 mg/dL in men or > 2.0 mg/dL in women  [] Other Contraindications  [] Not Eligible  [] Not Tolerant  [] Patient Reason  []System Reason  [x]Other Reason      Beta Blocker ordered:    [x] Yes  [] No (specify contraindication):    [] Contraindicated  [] Asthma  [] Fluid Overload  [] Low Blood Pressure  [] Patient recently treated with an intravenous positive inotropic agent  [] Other Contraindications  [] Not Eligible  [] Not Tolerant  [] Patient Reason  [] System Reason    SGLT2 Inhibitor ordered:  [] Yes  [x] No (specify contraindication):    [] Contraindicated  [] Patient currently on dialysis  [] Ketoacidosis  [] Known hypersensitivity to the medication  [] Type I diabetes (not approved for use in patients with Type I diabetes due to increased risk of ketoacidosis)  [] Other Contraindications  [] Not Eligible  [] Not Tolerant  [] Patient Reason  [] System Reason  [x] Other Reason    Aldosterone Antagonist ordered:  [] Yes  [x] No (specify contraindication):    [] Contraindicated  [] Allergy due to aldosterone receptor antagonist  [] Hyperkalemia  [] Renal dysfunction defined as creatinine >2.5 mg/dL in men or >2.0 mg/dL in women.   [] Other contraindications  [] Not Eligible  [] Not Tolerant  [] Patient Reason  [] System Reason  [x] Other Reason

## 2022-10-04 NOTE — H&P
Hospital Medicine History & Physical      PCP: Gely Ramirez DO    Date of Admission: 10/3/2022    Date of Service: . OCT 4, 2022    Chief Complaint:  SOB      History Of Present Illness:     74 y.o. female presented with SOB. SHE IS FROM A [facility] , AND A VERY POOR HISTORIAN. SHE HAS A KNOWN HISTORY OF COPD AND BECAME SOB SEVERAL HOURS BEFORE SHE CAME TO THE ER ACCORDING TO THE EMR. SHE WAS FOUND TO BE HYPOXIC. SHE  IS CHRONICALLY ON O2 4 LITERS   Past Medical History:          Diagnosis Date    (HFpEF) heart failure with preserved ejection fraction ([facility]     1/16/2018- echo- LVEF 55-65%    Anal fissure     Anemia 10/6/2017    Anxiety and depression     Arthritis     Asthma     Atrial fibrillation (HCC)     Eliquis    Blood transfusion     long time no reaction    CHF (congestive heart failure) (HCC)     Diastolic    COPD (chronic obstructive pulmonary disease) ([facility]     Disc disorder     DM2 (diabetes mellitus, type 2) ([facility]     on insulin    Fall due to stumbling 10/6/2017    GERD (gastroesophageal reflux disease)     History of blood transfusion     Hx of blood clots     Hyperlipidemia     Hypertension     Inappropriate sinus tachycardia     LVH (left ventricular hypertrophy)     moderate    Lymphadenitis, chronic 4/13/2018    Occipital neuralgia 11/2/2011    Respiratory acidosis 10/7/2017    Sinus tachycardia 2/16/2015       Past Surgical History:          Procedure Laterality Date    ANOSCOPY  10/25/2006    injection of anal sphincter with Botox, Franklyn Ortiz/Timmy, [facility]    ANOSCOPY  4/9/2007    injection of anal sphincter with Botox, Dr. Alba Moritz, [facility]    ANOSCOPY  6/13/2007    injection of anal sphincter with Botox, Franklyn Vega [facility]    ANUS SURGERY  4/4/2006    Lateral sphincterotomy for chronic anal fissure, Dr. Kyle Piedra, [address]  4/18/2012    anal exam and injection of Botox 100 units into anal sphincter, Laila Rios, [facility]    COLONOSCOPY  1/20/2004    cecal polyp, bx (no pathologic changes), Dr. Dominique Nash, [facility]    COLONOSCOPY  8/11/2006    snare polypectomy terminal ileum polyp (granulation tissue polyp) and anal polyp (inflammatory/papilla), Dr. Soledad Barreto, [facility]    COLONOSCOPY  3/23/2012    bx/cauterization proximal ascending colon polyp, injection of anal sphincter with Botox, Dr. Soledad Barreto, [facility]    ENDOSCOPY, COLON, DIAGNOSTIC      FRACTURE SURGERY      R leg fracture with surgery for repair    INSERT MIDLINE CATHETER  3/18/2022         JOINT REPLACEMENT  2003    L knee    PICC LINE INSERTION NURSE  3/20/2022         DE DEBRIDEMENT, SKIN, SUB-Q TISSUE,MUSCLE,=<20 SQ CM Left 11/30/2018    LEFT LEG EXCISIONAL  DEBRIDEMENT WITH TISSUE BIOPSY performed by Orville Vieira DPM at [place] [address]  1/20/2004    gastritis, bx (no H pylori), Dr. Dominique Nash, [facility] ENDOSCOPY N/A 6/1/2018    EGD BIOPSY performed by Dewayne Horn MD at [address] [place] [address] N/A 11/9/2018    EGD BIOPSY performed by Dewayne Horn MD at [facility] ENDOSCOPY       Medications Prior to Admission:      Prior to Admission medications    Medication Sig Start Date End Date Taking?  Authorizing Provider   amiodarone (PACERONE) 100 MG tablet Take 100 mg by mouth daily   Yes Historical Provider, MD   amLODIPine (NORVASC) 10 MG tablet Take 10 mg by mouth daily   Yes Historical Provider, MD   calcium-vitamin D (OSCAL-500) 500-200 MG-UNIT per tablet Take 1 tablet by mouth 2 times daily   Yes Historical Provider, MD   pantoprazole (PROTONIX) 40 MG tablet Take 40 mg by mouth daily   Yes Historical Provider, MD   potassium chloride (KLOR-CON) 20 MEQ packet Take 20 mEq by mouth daily   Yes Historical Provider, MD traZODone (DESYREL) 50 MG tablet Take 50 mg by mouth nightly   Yes Historical Provider, MD   nadolol (CORGARD) 20 MG tablet Take 10 mg by mouth daily   Yes Historical Provider, MD   oxyCODONE (OXYCONTIN) 10 MG extended release tablet Take 10 mg by mouth every 6 hours as needed for Pain.    Yes Historical Provider, MD   docusate sodium (COLACE) 100 MG capsule Take 100 mg by mouth 2 times daily   Yes Historical Provider, MD   senna (SENOKOT) 8.6 MG tablet Take 2 tablets by mouth daily   Yes Historical Provider, MD   polyethylene glycol (GLYCOLAX) 17 g packet Take 17 g by mouth daily as needed for Constipation   Yes Historical Provider, MD   insulin glargine (LANTUS) 100 UNIT/ML injection vial Inject 30 Units into the skin 2 times daily *SEE OTHER ORDER*  Patient taking differently: Inject 44 Units into the skin 2 times daily *SEE OTHER ORDER* 3/22/22   Baby Seip, MD   DULoxetine (CYMBALTA) 30 MG extended release capsule Take 60 mg by mouth every morning *SEE OTHER ORDER*    Historical Provider, MD   DULoxetine (CYMBALTA) 60 MG extended release capsule Take 60 mg by mouth nightly *SEE OTHER ORDER*    Historical Provider, MD   hydrOXYzine (ATARAX) 50 MG tablet Take 50 mg by mouth 3 times daily    Historical Provider, MD   ipratropium (ATROVENT) 0.02 % nebulizer solution Take 0.5 mg by nebulization 3 times daily *SEE OTHER ORDER*    Historical Provider, MD   ipratropium (ATROVENT) 0.02 % nebulizer solution Take 0.5 mg by nebulization every 3 hours as needed for Wheezing *SEE OTHER ORDER*    Historical Provider, MD   ezetimibe (ZETIA) 10 MG tablet Take 1 tablet by mouth nightly 5/6/21   Baby Seip, MD   acetaminophen (TYLENOL) 500 MG tablet Take 1,000 mg by mouth every 6 hours as needed for Pain Indications: -NTE: 3G/24HRS-     Historical Provider, MD   apixaban (ELIQUIS) 5 MG TABS tablet Take 5 mg by mouth 2 times daily    Historical Provider, MD   bumetanide (BUMEX) 2 MG tablet Take 1 mg by mouth daily    Historical Provider, MD   gabapentin (NEURONTIN) 600 MG tablet Take 200 mg by mouth 3 times daily. Historical Provider, MD       Allergies:  Statins    Social History:      The [place] lives [place]    TOBACCO:   reports that she quit smoking about 17 years ago. Her smoking use included cigarettes. She started smoking about 42 years ago. She has a 37.50 pack-year smoking history. She has never used smokeless tobacco.  ETOH:   reports that she does not currently use alcohol. Family History:       Reviewed in detail and negative for DM, CAD, Cancer, CVA. Positive as follows:        Problem Relation Age of Onset    Heart Disease Mother     Diabetes Father     Colon Cancer Father     Other Father         BLINDNESS    Colon Cancer Sister     Other Sister         shingles- has had over 1 year    Diabetes Paternal Aunt     Diabetes Paternal Uncle     Diabetes Paternal Aunt     Diabetes Paternal Uncle     Diabetes Sister        REVIEW OF SYSTEMS:   Pertinent positives as noted in the HPI. All other systems reviewed and negative. PHYSICAL EXAM:    /72   Pulse 92   Temp 98.3 °F (36.8 °C) (Axillary)   Resp 18   Ht 5' 4\" (1.626 m)   Wt 282 lb 3.2 oz (128 kg)   LMP  (LMP Unknown)   SpO2 97%   BMI 48.44 kg/m²     General appearance:  No apparent distress, appears  OLDER  THAN stated age   HEENT:  Normal cephalic, . Neck: Supple, with full range of motion. No jugular venous distention. Trachea midline. Respiratory:  Normal respiratory effort. DIMINISHED BILATERALLY i.  Cardiovascular:  Regular rate and rhythm   Abdomen: Soft, non-tender, non-distended with normal bowel sounds. Musculoskeletal:  No clubbing, cyanosis POS edema bilaterally. Skin: Skin color, texture, turgor normal.  No rashes or lesions.   Neurologic:  SHE IS SOUND ASLEEP  Labs:     Recent Labs     10/04/22  0007   WBC 6.3   HGB 9.3*   HCT 31.0*        Recent Labs     10/04/22  0007      K 3.8   CL 91*   CO2 39*   BUN 22 CREATININE 0.7   CALCIUM 9.4     Recent Labs     10/04/22  0007   AST 10   ALT 7   BILITOT <0.2   ALKPHOS 93     No results for input(s): INR in the last 72 hours. No results for input(s): Opal [place] in the last 72 hours. Urinalysis:      Lab Results   Component Value Date/Time    NITRU Negative 09/01/2021 04:15 AM    WBCUA NONE 08/18/2021 01:23 PM    BACTERIA MANY 08/18/2021 01:23 PM    RBCUA 1-3 08/18/2021 01:23 PM    BLOODU Negative 09/01/2021 04:15 AM    SPECGRAV 1.015 09/01/2021 04:15 AM    GLUCOSEU Negative 09/01/2021 04:15 AM       Radiology:   XR CHEST PORTABLE   Final Result   No acute process. ASSESSMENT:    Active Hospital Problems    Diagnosis Date Noted    Acute and chronic respiratory failure with hypercapnia ([facility] [J96.22] 10/04/2022     Priority: Medium    DM (diabetes mellitus) ([facility] [E11.9] 10/04/2022     Priority: Medium   LYMPH EDEMA   PAF         PLAN:  TOPROL   STEROIDS  BUMEX  SSI    DVT Prophylaxis: ELIQUIS  Diet: ADULT DIET; Regular; 4 carb choices (60 gm/meal); Low Fat/Low Chol/High Fiber/MAURI  Code Status: Full Code    PT/OT Eval Status: ORDERED    Dispo - SNF    Electronically signed by Vandana Amanda DO on 10/4/2022 at 2:52 PM FACOI       Thank you Nelda Flowers DO for the opportunity to be involved in this patient's care.  If you have any questions or concerns please feel free to contact me at [phone]

## 2022-10-05 LAB
ANION GAP SERPL CALCULATED.3IONS-SCNC: 8 MMOL/L (ref 7–16)
BASOPHILS ABSOLUTE: 0.01 E9/L (ref 0–0.2)
BASOPHILS RELATIVE PERCENT: 0.1 % (ref 0–2)
BUN BLDV-MCNC: 25 MG/DL (ref 6–23)
CALCIUM SERPL-MCNC: 9.4 MG/DL (ref 8.6–10.2)
CHLORIDE BLD-SCNC: 90 MMOL/L (ref 98–107)
CO2: 41 MMOL/L (ref 22–29)
CREAT SERPL-MCNC: 1 MG/DL (ref 0.5–1)
EOSINOPHILS ABSOLUTE: 0 E9/L (ref 0.05–0.5)
EOSINOPHILS RELATIVE PERCENT: 0 % (ref 0–6)
GFR AFRICAN AMERICAN: >60
GFR NON-AFRICAN AMERICAN: 55 ML/MIN/1.73
GLUCOSE BLD-MCNC: 360 MG/DL (ref 74–99)
HCT VFR BLD CALC: 33.2 % (ref 34–48)
HEMOGLOBIN: 9.9 G/DL (ref 11.5–15.5)
IMMATURE GRANULOCYTES #: 0.05 E9/L
IMMATURE GRANULOCYTES %: 0.7 % (ref 0–5)
LYMPHOCYTES ABSOLUTE: 1.09 E9/L (ref 1.5–4)
LYMPHOCYTES RELATIVE PERCENT: 14.8 % (ref 20–42)
MCH RBC QN AUTO: 25.5 PG (ref 26–35)
MCHC RBC AUTO-ENTMCNC: 29.8 % (ref 32–34.5)
MCV RBC AUTO: 85.6 FL (ref 80–99.9)
METER GLUCOSE: 291 MG/DL (ref 74–99)
METER GLUCOSE: 346 MG/DL (ref 74–99)
METER GLUCOSE: 359 MG/DL (ref 74–99)
METER GLUCOSE: 375 MG/DL (ref 74–99)
MONOCYTES ABSOLUTE: 0.45 E9/L (ref 0.1–0.95)
MONOCYTES RELATIVE PERCENT: 6.1 % (ref 2–12)
NEUTROPHILS ABSOLUTE: 5.76 E9/L (ref 1.8–7.3)
NEUTROPHILS RELATIVE PERCENT: 78.3 % (ref 43–80)
PDW BLD-RTO: 14.3 FL (ref 11.5–15)
PLATELET # BLD: 228 E9/L (ref 130–450)
PMV BLD AUTO: 10.7 FL (ref 7–12)
POTASSIUM REFLEX MAGNESIUM: 4.1 MMOL/L (ref 3.5–5)
POTASSIUM SERPL-SCNC: 4.1 MMOL/L (ref 3.5–5)
PROCALCITONIN: 0.06 NG/ML (ref 0–0.08)
RBC # BLD: 3.88 E12/L (ref 3.5–5.5)
SODIUM BLD-SCNC: 139 MMOL/L (ref 132–146)
WBC # BLD: 7.4 E9/L (ref 4.5–11.5)

## 2022-10-05 PROCEDURE — 94660 CPAP INITIATION&MGMT: CPT

## 2022-10-05 PROCEDURE — 2700000000 HC OXYGEN THERAPY PER DAY

## 2022-10-05 PROCEDURE — 2060000000 HC ICU INTERMEDIATE R&B

## 2022-10-05 PROCEDURE — 82962 GLUCOSE BLOOD TEST: CPT

## 2022-10-05 PROCEDURE — 2500000003 HC RX 250 WO HCPCS: Performed by: INTERNAL MEDICINE

## 2022-10-05 PROCEDURE — 6360000002 HC RX W HCPCS

## 2022-10-05 PROCEDURE — 84145 PROCALCITONIN (PCT): CPT

## 2022-10-05 PROCEDURE — 94640 AIRWAY INHALATION TREATMENT: CPT

## 2022-10-05 PROCEDURE — 36415 COLL VENOUS BLD VENIPUNCTURE: CPT

## 2022-10-05 PROCEDURE — 85025 COMPLETE CBC W/AUTO DIFF WBC: CPT

## 2022-10-05 PROCEDURE — 80048 BASIC METABOLIC PNL TOTAL CA: CPT

## 2022-10-05 PROCEDURE — 2580000003 HC RX 258

## 2022-10-05 PROCEDURE — 6370000000 HC RX 637 (ALT 250 FOR IP): Performed by: INTERNAL MEDICINE

## 2022-10-05 PROCEDURE — 6370000000 HC RX 637 (ALT 250 FOR IP)

## 2022-10-05 RX ADMIN — INSULIN LISPRO 8 UNITS: 100 INJECTION, SOLUTION INTRAVENOUS; SUBCUTANEOUS at 11:06

## 2022-10-05 RX ADMIN — INSULIN LISPRO 4 UNITS: 100 INJECTION, SOLUTION INTRAVENOUS; SUBCUTANEOUS at 20:05

## 2022-10-05 RX ADMIN — BUMETANIDE 1 MG: 0.25 INJECTION INTRAMUSCULAR; INTRAVENOUS at 08:56

## 2022-10-05 RX ADMIN — ACETAMINOPHEN 1000 MG: 500 TABLET ORAL at 08:57

## 2022-10-05 RX ADMIN — SODIUM CHLORIDE, PRESERVATIVE FREE 10 ML: 5 INJECTION INTRAVENOUS at 08:58

## 2022-10-05 RX ADMIN — APIXABAN 5 MG: 5 TABLET, FILM COATED ORAL at 20:07

## 2022-10-05 RX ADMIN — OMEGA-3-ACID ETHYL ESTERS 2 G: 900 CAPSULE ORAL at 08:57

## 2022-10-05 RX ADMIN — APIXABAN 5 MG: 5 TABLET, FILM COATED ORAL at 08:51

## 2022-10-05 RX ADMIN — INSULIN GLARGINE 30 UNITS: 100 INJECTION, SOLUTION SUBCUTANEOUS at 08:52

## 2022-10-05 RX ADMIN — GABAPENTIN 600 MG: 300 CAPSULE ORAL at 13:32

## 2022-10-05 RX ADMIN — HYDROXYZINE PAMOATE 50 MG: 25 CAPSULE ORAL at 20:08

## 2022-10-05 RX ADMIN — GABAPENTIN 600 MG: 300 CAPSULE ORAL at 20:07

## 2022-10-05 RX ADMIN — MICONAZOLE NITRATE: 2 POWDER TOPICAL at 08:56

## 2022-10-05 RX ADMIN — METHYLPREDNISOLONE SODIUM SUCCINATE 40 MG: 40 INJECTION, POWDER, LYOPHILIZED, FOR SOLUTION INTRAMUSCULAR; INTRAVENOUS at 06:28

## 2022-10-05 RX ADMIN — ACETAMINOPHEN 1000 MG: 500 TABLET ORAL at 15:36

## 2022-10-05 RX ADMIN — INSULIN LISPRO 12 UNITS: 100 INJECTION, SOLUTION INTRAVENOUS; SUBCUTANEOUS at 15:36

## 2022-10-05 RX ADMIN — OMEGA-3-ACID ETHYL ESTERS 2 G: 900 CAPSULE ORAL at 20:08

## 2022-10-05 RX ADMIN — HYDROXYZINE PAMOATE 50 MG: 25 CAPSULE ORAL at 08:51

## 2022-10-05 RX ADMIN — SODIUM CHLORIDE, PRESERVATIVE FREE 10 ML: 5 INJECTION INTRAVENOUS at 20:06

## 2022-10-05 RX ADMIN — IPRATROPIUM BROMIDE 0.5 MG: 0.5 SOLUTION RESPIRATORY (INHALATION) at 09:13

## 2022-10-05 RX ADMIN — GABAPENTIN 600 MG: 300 CAPSULE ORAL at 08:51

## 2022-10-05 RX ADMIN — METOPROLOL SUCCINATE 100 MG: 100 TABLET, EXTENDED RELEASE ORAL at 08:51

## 2022-10-05 RX ADMIN — HYDROXYZINE PAMOATE 50 MG: 25 CAPSULE ORAL at 13:32

## 2022-10-05 RX ADMIN — DULOXETINE HYDROCHLORIDE 60 MG: 60 CAPSULE, DELAYED RELEASE ORAL at 20:07

## 2022-10-05 RX ADMIN — INSULIN LISPRO 16 UNITS: 100 INJECTION, SOLUTION INTRAVENOUS; SUBCUTANEOUS at 06:35

## 2022-10-05 RX ADMIN — METOPROLOL SUCCINATE 100 MG: 100 TABLET, EXTENDED RELEASE ORAL at 20:07

## 2022-10-05 RX ADMIN — EZETIMIBE 10 MG: 10 TABLET ORAL at 20:08

## 2022-10-05 RX ADMIN — MICONAZOLE NITRATE: 2 POWDER TOPICAL at 20:08

## 2022-10-05 RX ADMIN — IPRATROPIUM BROMIDE 0.5 MG: 0.5 SOLUTION RESPIRATORY (INHALATION) at 13:38

## 2022-10-05 RX ADMIN — PANTOPRAZOLE SODIUM 40 MG: 40 TABLET, DELAYED RELEASE ORAL at 06:28

## 2022-10-05 RX ADMIN — BUMETANIDE 1 MG: 0.25 INJECTION INTRAMUSCULAR; INTRAVENOUS at 20:07

## 2022-10-05 RX ADMIN — INSULIN GLARGINE 30 UNITS: 100 INJECTION, SOLUTION SUBCUTANEOUS at 20:05

## 2022-10-05 RX ADMIN — IPRATROPIUM BROMIDE 0.5 MG: 0.5 SOLUTION RESPIRATORY (INHALATION) at 20:51

## 2022-10-05 RX ADMIN — METHYLPREDNISOLONE SODIUM SUCCINATE 40 MG: 40 INJECTION, POWDER, LYOPHILIZED, FOR SOLUTION INTRAMUSCULAR; INTRAVENOUS at 18:31

## 2022-10-05 ASSESSMENT — PAIN DESCRIPTION - LOCATION: LOCATION: FOOT

## 2022-10-05 ASSESSMENT — PAIN DESCRIPTION - DESCRIPTORS: DESCRIPTORS: ACHING;CRAMPING;THROBBING

## 2022-10-05 ASSESSMENT — PAIN DESCRIPTION - ORIENTATION: ORIENTATION: RIGHT;LEFT

## 2022-10-05 ASSESSMENT — PAIN SCALES - GENERAL
PAINLEVEL_OUTOF10: 0
PAINLEVEL_OUTOF10: 0
PAINLEVEL_OUTOF10: 6

## 2022-10-05 NOTE — PROGRESS NOTES
Hospitalist Progress Note      PCP: Mojgan Deluna DO    Date of Admission: 10/3/2022        Hospital Course:  74 y.o. female presented with SOB. SHE IS FROM A [place] , AND A VERY POOR HISTORIAN. SHE HAS A KNOWN HISTORY OF COPD AND BECAME SOB SEVERAL HOURS BEFORE SHE CAME TO THE ER ACCORDING TO THE EMR. SHE WAS FOUND TO BE MORE HYPOXIC . HYPOXIC. MORE ALERT HIS AM PROCAL NEG.          Subjective: WATCHING TV, NO COMPLAINTS, FEELS BETTER           Medications:  Reviewed    Infusion Medications    dextrose      sodium chloride       Scheduled Medications    apixaban  5 mg Oral BID    DULoxetine  60 mg Oral Nightly    ezetimibe  10 mg Oral Nightly    gabapentin  600 mg Oral TID    hydrOXYzine pamoate  50 mg Oral TID    insulin glargine  30 Units SubCUTAneous BID    ipratropium  0.5 mg Nebulization TID    metoprolol succinate  100 mg Oral BID    omega-3 acid ethyl esters  2 g Oral BID    pantoprazole  40 mg Oral QAM AC    sodium chloride flush  5-40 mL IntraVENous 2 times per day    methylPREDNISolone  40 mg IntraVENous Q12H    Followed by    Kellie Mcmillan ON 10/6/2022] predniSONE  20 mg Oral BID    bumetanide  1 mg IntraVENous BID    miconazole   Topical BID    insulin lispro  0-16 Units SubCUTAneous TID WC    insulin lispro  0-4 Units SubCUTAneous Nightly     PRN Meds: acetaminophen, ipratropium, glucose, dextrose bolus **OR** dextrose bolus, glucagon (rDNA), dextrose, sodium chloride flush, sodium chloride, magnesium hydroxide, acetaminophen **OR** acetaminophen      Intake/Output Summary (Last 24 hours) at 10/5/2022 1526  Last data filed at 10/5/2022 1156  Gross per 24 hour   Intake --   Output 3725 ml   Net -3725 ml       Exam:    BP (!) 132/55   Pulse 81   Temp 98.4 °F (36.9 °C) (Oral)   Resp 18   Ht 5' 4\" (1.626 m)   Wt 271 lb 8 oz (123.2 kg)   LMP  (LMP Unknown)   SpO2 98%   BMI 46.60 kg/m²       General appearance:  No apparent distress, appears  OLDER  THAN stated age   HEENT:  Normal cephalic, .  Neck: Supple, with full range of motion. No jugular venous distention. Trachea midline. Respiratory:  Normal respiratory effort. DIMINISHED BILATERALLY i.  Cardiovascular:  Regular rate and rhythm   Abdomen: Soft, non-tender, non-distended with normal bowel sounds. Musculoskeletal:  No clubbing, cyanosis POS edema bilaterally. Skin: Skin color, texture, turgor normal.  No rashes or lesions. Neurologic:   AOAX3            Labs:   Recent Labs     10/04/22  0007 10/05/22  0445   WBC 6.3 7.4   HGB 9.3* 9.9*   HCT 31.0* 33.2*    228     Recent Labs     10/04/22  0007 10/05/22  0445    139   K 3.8 4.1  4.1   CL 91* 90*   CO2 39* 41*   BUN 22 25*   CREATININE 0.7 1.0   CALCIUM 9.4 9.4     Recent Labs     10/04/22  0007   AST 10   ALT 7   BILITOT <0.2   ALKPHOS 93     No results for input(s): INR in the last 72 hours. No results for input(s): [place] Bounds in the last 72 hours. Recent Labs     10/04/22  0007   AST 10   ALT 7   BILITOT <0.2   ALKPHOS 93     No results for input(s): LACTA in the last 72 hours. Lab Results   Component Value Date    LABURIC 4.9 10/24/2016     No results found for: AMMONIA    Assessment:    Active Hospital Problems    Diagnosis Date Noted    Acute and chronic respiratory failure with [facility]capnia [facility] [J96.22] 10/04/2022     Priority: Medium    DM (diabetes mellitus) ([facility] [E11.9] 10/04/2022     Priority: Medium   LYMPH EDEMA   PAF           PLAN:  TOPROL   STEROIDS  BUMEX  SSI     DVT Prophylaxis: ELIQUIS  Diet: ADULT DIET; Regular; 4 carb choices (60 gm/meal);  Low Fat/Low Chol/High Fiber/MAURI  Code Status: Full Code     PT/OT Eval Status: ORDERED     Dispo - SNF  MORBID OBESITY        Electronically signed by Ridge Ulrich DO on 10/5/2022 at 3:26 PM [facility]

## 2022-10-05 NOTE — PROGRESS NOTES
10/05/22 0030   NIV Type   $NIV $Daily Charge   Skin Protection for O2 Device Yes   Mode Biphasic   Mask Type Full face mask   Mask Size Small   Settings/Measurements   IPAP 15 cmH20   CPAP/EPAP 6 cmH2O   Vt (Measured) 398 mL   Rate Ordered 14   Resp 17   FiO2  40 %   Mask Leak (lpm) 34 lpm   Comfort Level Fair   Using Accessory Muscles No   Patient Observation   Observations red outlet   Alarm Settings   Alarms On Y   Low Pressure (cmH2O)   (in wall)

## 2022-10-05 NOTE — PROGRESS NOTES
Physician Progress Note      PATIENT:               Carissa Saucedo  CSN #:                  [id number]  :                       1955  ADMIT DATE:       10/3/2022 9:51 PM  [address] [place] DATE:  Saurav Bairon  PROVIDER #:        Funmilayo Santos DO          QUERY TEXT:    Pt admitted with acute respiratory failure and has CHF documented. If   possible, please document in progress notes and discharge summary further   specificity regarding the type and acuity of CHF:    The medical record reflects the following:  Risk Factors: chronic diastolic heart failure, hypertension, a fib  Clinical Indicators: , CXR without acute process  Treatment: IV Bumex    Thank you,  Benito MEDRANO, RN, CDIS  Clinical Documentation Improvement  [email or web address]  Options provided:  -- Acute on Chronic Diastolic CHF/HFpEF  -- Chronic Diastolic CHF/HFpEF  -- Other - I will add my own diagnosis  -- Disagree - Not applicable / Not valid  -- Disagree - Clinically unable to determine / Unknown  -- Refer to Clinical Documentation Reviewer    PROVIDER RESPONSE TEXT:    This patient has chronic diastolic CHF/HFpEF.     Query created by: Tomy Archuleta on 10/5/2022 9:21 AM      Electronically signed by:  Funmilayo Santos DO 10/5/2022 9:24 AM

## 2022-10-05 NOTE — CARE COORDINATION
Social work / Discharge Planning:          Patient is a Medicaid bed hold from [facility]. No precert needed to return. Will need negative covid result on day of discharge. LENO and transport form completed.     Electronically signed by JADEN Hess on 10/5/2022 at 1:10 PM

## 2022-10-06 VITALS
DIASTOLIC BLOOD PRESSURE: 58 MMHG | SYSTOLIC BLOOD PRESSURE: 136 MMHG | OXYGEN SATURATION: 100 % | TEMPERATURE: 98.2 F | WEIGHT: 268.6 LBS | HEIGHT: 64 IN | BODY MASS INDEX: 45.85 KG/M2 | RESPIRATION RATE: 18 BRPM | HEART RATE: 69 BPM

## 2022-10-06 LAB
ANION GAP SERPL CALCULATED.3IONS-SCNC: 8 MMOL/L (ref 7–16)
BUN BLDV-MCNC: 29 MG/DL (ref 6–23)
CALCIUM SERPL-MCNC: 9.1 MG/DL (ref 8.6–10.2)
CHLORIDE BLD-SCNC: 88 MMOL/L (ref 98–107)
CO2: 41 MMOL/L (ref 22–29)
CREAT SERPL-MCNC: 0.9 MG/DL (ref 0.5–1)
GFR AFRICAN AMERICAN: >60
GFR NON-AFRICAN AMERICAN: >60 ML/MIN/1.73
GLUCOSE BLD-MCNC: 405 MG/DL (ref 74–99)
METER GLUCOSE: 304 MG/DL (ref 74–99)
METER GLUCOSE: 345 MG/DL (ref 74–99)
METER GLUCOSE: 348 MG/DL (ref 74–99)
POTASSIUM SERPL-SCNC: 4.3 MMOL/L (ref 3.5–5)
SARS-COV-2, NAAT: NOT DETECTED
SODIUM BLD-SCNC: 137 MMOL/L (ref 132–146)

## 2022-10-06 PROCEDURE — 2580000003 HC RX 258

## 2022-10-06 PROCEDURE — 6370000000 HC RX 637 (ALT 250 FOR IP)

## 2022-10-06 PROCEDURE — 97110 THERAPEUTIC EXERCISES: CPT

## 2022-10-06 PROCEDURE — 82962 GLUCOSE BLOOD TEST: CPT

## 2022-10-06 PROCEDURE — 36415 COLL VENOUS BLD VENIPUNCTURE: CPT

## 2022-10-06 PROCEDURE — 6370000000 HC RX 637 (ALT 250 FOR IP): Performed by: INTERNAL MEDICINE

## 2022-10-06 PROCEDURE — 94640 AIRWAY INHALATION TREATMENT: CPT

## 2022-10-06 PROCEDURE — 2700000000 HC OXYGEN THERAPY PER DAY

## 2022-10-06 PROCEDURE — 87635 SARS-COV-2 COVID-19 AMP PRB: CPT

## 2022-10-06 PROCEDURE — 94660 CPAP INITIATION&MGMT: CPT

## 2022-10-06 PROCEDURE — 2500000003 HC RX 250 WO HCPCS: Performed by: INTERNAL MEDICINE

## 2022-10-06 PROCEDURE — 6360000002 HC RX W HCPCS

## 2022-10-06 PROCEDURE — 80048 BASIC METABOLIC PNL TOTAL CA: CPT

## 2022-10-06 RX ORDER — INSULIN LISPRO 100 [IU]/ML
0-16 INJECTION, SOLUTION INTRAVENOUS; SUBCUTANEOUS
DISCHARGE
Start: 2022-10-06

## 2022-10-06 RX ORDER — PREDNISONE 10 MG/1
TABLET ORAL
Qty: 30 TABLET | Refills: 0 | DISCHARGE
Start: 2022-10-06

## 2022-10-06 RX ORDER — METOPROLOL SUCCINATE 100 MG/1
100 TABLET, EXTENDED RELEASE ORAL 2 TIMES DAILY
Qty: 30 TABLET | Refills: 3 | DISCHARGE
Start: 2022-10-06

## 2022-10-06 RX ORDER — OMEGA-3-ACID ETHYL ESTERS 1 G/1
2 CAPSULE, LIQUID FILLED ORAL 2 TIMES DAILY
Qty: 60 CAPSULE | Refills: 3 | DISCHARGE
Start: 2022-10-06

## 2022-10-06 RX ORDER — INSULIN LISPRO 100 [IU]/ML
0-4 INJECTION, SOLUTION INTRAVENOUS; SUBCUTANEOUS NIGHTLY
DISCHARGE
Start: 2022-10-06

## 2022-10-06 RX ADMIN — PANTOPRAZOLE SODIUM 40 MG: 40 TABLET, DELAYED RELEASE ORAL at 06:06

## 2022-10-06 RX ADMIN — ACETAMINOPHEN 1000 MG: 500 TABLET ORAL at 03:30

## 2022-10-06 RX ADMIN — BUMETANIDE 1 MG: 0.25 INJECTION INTRAMUSCULAR; INTRAVENOUS at 08:42

## 2022-10-06 RX ADMIN — GABAPENTIN 600 MG: 300 CAPSULE ORAL at 13:58

## 2022-10-06 RX ADMIN — INSULIN GLARGINE 30 UNITS: 100 INJECTION, SOLUTION SUBCUTANEOUS at 08:46

## 2022-10-06 RX ADMIN — PREDNISONE 20 MG: 20 TABLET ORAL at 06:06

## 2022-10-06 RX ADMIN — IPRATROPIUM BROMIDE 0.5 MG: 0.5 SOLUTION RESPIRATORY (INHALATION) at 08:11

## 2022-10-06 RX ADMIN — HYDROXYZINE PAMOATE 50 MG: 25 CAPSULE ORAL at 08:42

## 2022-10-06 RX ADMIN — METOPROLOL SUCCINATE 100 MG: 100 TABLET, EXTENDED RELEASE ORAL at 08:42

## 2022-10-06 RX ADMIN — INSULIN LISPRO 12 UNITS: 100 INJECTION, SOLUTION INTRAVENOUS; SUBCUTANEOUS at 16:05

## 2022-10-06 RX ADMIN — INSULIN LISPRO 12 UNITS: 100 INJECTION, SOLUTION INTRAVENOUS; SUBCUTANEOUS at 11:54

## 2022-10-06 RX ADMIN — APIXABAN 5 MG: 5 TABLET, FILM COATED ORAL at 08:42

## 2022-10-06 RX ADMIN — ACETAMINOPHEN 1000 MG: 500 TABLET ORAL at 13:58

## 2022-10-06 RX ADMIN — INSULIN LISPRO 12 UNITS: 100 INJECTION, SOLUTION INTRAVENOUS; SUBCUTANEOUS at 06:06

## 2022-10-06 RX ADMIN — GABAPENTIN 600 MG: 300 CAPSULE ORAL at 08:42

## 2022-10-06 RX ADMIN — MICONAZOLE NITRATE: 2 POWDER TOPICAL at 08:42

## 2022-10-06 RX ADMIN — IPRATROPIUM BROMIDE 0.5 MG: 0.5 SOLUTION RESPIRATORY (INHALATION) at 13:09

## 2022-10-06 RX ADMIN — OMEGA-3-ACID ETHYL ESTERS 2 G: 900 CAPSULE ORAL at 08:42

## 2022-10-06 RX ADMIN — HYDROXYZINE PAMOATE 50 MG: 25 CAPSULE ORAL at 13:58

## 2022-10-06 RX ADMIN — SODIUM CHLORIDE, PRESERVATIVE FREE 10 ML: 5 INJECTION INTRAVENOUS at 08:44

## 2022-10-06 ASSESSMENT — PAIN DESCRIPTION - LOCATION: LOCATION: LEG

## 2022-10-06 ASSESSMENT — PAIN SCALES - GENERAL
PAINLEVEL_OUTOF10: 0
PAINLEVEL_OUTOF10: 5

## 2022-10-06 ASSESSMENT — PAIN DESCRIPTION - ORIENTATION: ORIENTATION: LEFT;RIGHT;LOWER

## 2022-10-06 ASSESSMENT — PAIN SCALES - WONG BAKER: WONGBAKER_NUMERICALRESPONSE: 0

## 2022-10-06 ASSESSMENT — PAIN - FUNCTIONAL ASSESSMENT: PAIN_FUNCTIONAL_ASSESSMENT: ACTIVITIES ARE NOT PREVENTED

## 2022-10-06 ASSESSMENT — PAIN DESCRIPTION - DESCRIPTORS: DESCRIPTORS: ACHING;DISCOMFORT;SORE;TENDER

## 2022-10-06 NOTE — CARE COORDINATION
Social work / Discharge Planning:         Discharge order noted. Patient needs negative covid result for return to [facility]. Facility liaison is arranging transport. Awaiting  time.    Electronically signed by JADEN Spears on 10/6/2022 at 12:38 PM

## 2022-10-06 NOTE — PROGRESS NOTES
P Quality Flow/Interdisciplinary Rounds Progress Note        Quality Flow Rounds held on October 6, 2022    Disciplines Attending:  Bedside Nurse, , , and Nursing Unit [address]70 Harish was admitted on 10/3/2022  9:51 PM    Anticipated Discharge Date:       Disposition:    Kavon Score:  Kavon Scale Score: 21    Readmission Risk              Risk of Unplanned Readmission:  23           Discussed patient goal for the day, patient clinical progression, and barriers to discharge. The following Goal(s) of the Day/Commitment(s) have been identified:  Continues on IV Bumex, wean today, now on Prednisone, check if ready to discharge-DOES NOT PRE CERT.       Yaquelin Crump RN  October 6, 2022

## 2022-10-06 NOTE — PROGRESS NOTES
Occupational Therapy  OT BEDSIDE TREATMENT NOTE      Date:10/6/2022  Patient Name: Barbara Ohara  MRN: [id number]  : 1955  Room: [address] [place], [place] [address]       Evaluating OT: Sara Brothers OTR/L   [id number]       Referring RAUL Earl CNP    Specific Provider Orders/Date:OT eval and treat 10/4/2022       Diagnosis:  COPD exacerbation ([address] [J44.1]  Acute and chronic respiratory failure with hypercapnia ([address] [J96.22]  Acute respiratory failure with hypercapnia ([address] [J96.02]     Pertinent Medical History: asthma, A fib, CHF, COPD, DM,    Precautions:  Fall Risk, O2      Assessment of current deficits    [x] Functional mobility            [x]ADLs           [x] Strength                  []Cognition    [x] Functional transfers          [x] IADLs         [x] Safety Awareness   [x]Endurance    [] Fine Coordination                         [x] Balance      [] Vision/perception   []Sensation      []Gross Motor Coordination             [] ROM           [] Delirium                   [] Motor Control      OT PLAN OF CARE   OT POC based on physician orders, patient diagnosis and results of clinical assessment     Frequency/Duration  1-3 days/wk for 2 weeks PRN   Specific OT Treatment Interventions to include:   ADL retraining/adapted techniques and AE recommendations to increase functional independence within precautions                    Energy conservation techniques to improve tolerance for selfcare routine   Functional transfer/mobility training/DME recommendations for increased independence, safety and fall prevention         Patient/family education to increase safety and functional independence             Environmental modifications for safe mobility and completion of ADLs                             Therapeutic activity to improve functional performance during ADLs.                                          Therapeutic exercise to improve tolerance and functional strength for ADLs    Balance retraining/tolerance tasks for facilitation of postural control with dynamic challenges during ADLs . Positioning to improve functional independence        Recommended Adaptive Equipment: continue to assess      SOCIAL:  patient  from SNF, walks short distances with walker      Pain Level: pt did not report level of pain. Cognition: Awake and alert. Functional Assessment:  AM-PAC Daily Activity Raw Score: 15/24    Initial Eval Status  Date: 10/4/22 Treatment Status  Date:10/6/22  STGs = LTGs  Time frame: 10-14 days   Feeding Independent        Grooming Min A, seated   setup seated. Supervision    UB Dressing Min A    Supervision    LB Dressing Mod A   max A to don slipper socks. Min A    Bathing Mod A  Max A LB bathe  Min A    Toileting Min A   declined to go to the bathroom. SBA    Bed Mobility  Min A  Supine to sit    SBA    Functional Transfers Min A  Sit - stand from bed  CGA to stand from recliner chair. CGA/SBA    Functional Mobility Min A,w/walker   Steps from bed to chair   CGA using w/w in room. Limited distance in room. Pt requesting to return to the chair. CGA/SBA  with good tolerance    Balance Sitting:     Static:  supervision     Dynamic:Min A   Standing: Min A   Supervision/SBA - standing   CG/SBA - standing    Activity Tolerance No SOB  Limited tolerance for activity. Pt reporting fatigue with minimal standing. O2 93%. Good  with ADL activity      Comments:  pt sitting in the chair. Agreeable to therapy. Limited stand tolerance. Requesting seated activity. Pt instructed with AROM exercises while seated. She demonstrated ability to complete ROM and also instructed with breathing techniques during exertion. She remained seated in the chair at the end of the session. Education/treatment:  ADL retraining with facilitation of movement to increase self care skills. Therapeutic activity to address balance and endurance for ADL and transfers.   Pt education of AROM HEP and energy conservation. Pt has made limited  progress towards set goals.        Time In: 10:25   Time Out: 10:35     Min Units   Therapeutic Ex [id number]    Therapeutic Activities [id number]     ADL/Self Care [id number]    Orthotic Management 88661     Neuro Re-Ed 19430     Non-Billable Time     TOTAL TIMED TREATMENT 10 [address] [facility] ROBERTO/L [address]

## 2022-10-06 NOTE — PROGRESS NOTES
10/06/22 0010   NIV Type   $NIV $Daily Charge   Mode (S)  Bilevel  (off prior refuses use s, standby)

## 2022-10-06 NOTE — PLAN OF CARE
Problem: Discharge Planning  Goal: Discharge to home or other facility with appropriate resources  Outcome: Progressing  Flowsheets (Taken 10/6/2022 0840)  Discharge to home or other facility with appropriate resources: Identify barriers to discharge with patient and caregiver     Problem: Chronic Conditions and Co-morbidities  Goal: Patient's chronic conditions and co-morbidity symptoms are monitored and maintained or improved  Outcome: Progressing  Flowsheets (Taken 10/6/2022 0840)  Care Plan - Patient's Chronic Conditions and Co-Morbidity Symptoms are Monitored and Maintained or Improved: Monitor and assess patient's chronic conditions and comorbid symptoms for stability, deterioration, or improvement     Problem: ABCDS Injury Assessment  Goal: Absence of physical injury  Outcome: Progressing  Flowsheets (Taken 10/6/2022 0840)  Absence of Physical Injury: Implement safety measures based on patient assessment     Problem: Safety - Adult  Goal: Free from fall injury  Outcome: Progressing  Flowsheets (Taken 10/6/2022 0840)  Free From Fall Injury: Instruct family/caregiver on patient safety

## 2022-10-06 NOTE — DISCHARGE INSTR - DIET

## 2022-10-06 NOTE — PROGRESS NOTES
Hospitalist Progress Note      PCP: Jodie Manriquez DO    Date of Admission: 10/3/2022        Hospital Course:  74 y.o. female presented with SOB. SHE IS [place] , AND A VERY POOR HISTORIAN. SHE HAS A KNOWN HISTORY OF COPD AND BECAME SOB SEVERAL HOURS BEFORE SHE CAME TO THE ER ACCORDING TO THE EMR. SHE WAS FOUND TO BE MORE HYPOXIC . TO DC TODAY           Subjective: CRYING, CONCERNED THAT HER KIDS DO NOT VISIT HER IN THE NURSING HOME           Medications:  Reviewed    Infusion Medications    dextrose      sodium chloride       Scheduled Medications    apixaban  5 mg Oral BID    DULoxetine  60 mg Oral Nightly    ezetimibe  10 mg Oral Nightly    gabapentin  600 mg Oral TID    hydrOXYzine pamoate  50 mg Oral TID    insulin glargine  30 Units SubCUTAneous BID    ipratropium  0.5 mg Nebulization TID    metoprolol succinate  100 mg Oral BID    omega-3 acid ethyl esters  2 g Oral BID    pantoprazole  40 mg Oral QAM AC    sodium chloride flush  5-40 mL IntraVENous 2 times per day    predniSONE  20 mg Oral BID    bumetanide  1 mg IntraVENous BID    miconazole   Topical BID    insulin lispro  0-16 Units SubCUTAneous TID WC    insulin lispro  0-4 Units SubCUTAneous Nightly     PRN Meds: acetaminophen, ipratropium, glucose, dextrose bolus **OR** dextrose bolus, glucagon (rDNA), dextrose, sodium chloride flush, sodium chloride, magnesium hydroxide, acetaminophen **OR** acetaminophen      Intake/Output Summary (Last 24 hours) at 10/6/2022 1637  Last data filed at 10/6/2022 1330  Gross per 24 hour   Intake --   Output 4550 ml   Net -4550 ml       Exam:    BP (!) 136/58   Pulse 69   Temp 98.2 °F (36.8 °C) (Oral)   Resp 18   Ht 5' 4\" (1.626 m)   Wt 268 lb 9.6 oz (121.8 kg)   LMP  (LMP Unknown)   SpO2 100%   BMI 46.11 kg/m²       General appearance:  No apparent distress, appears  OLDER  THAN stated age   HEENT:  Normal cephalic, . Neck: Supple, with full range of motion. No jugular venous distention. Trachea midline. Respiratory:  Normal respiratory effort. DIMINISHED BILATERALLY i.  Cardiovascular:  Regular rate and rhythm   Abdomen: Soft, non-tender, non-distended with normal bowel sounds. Musculoskeletal:  No clubbing, cyanosis POS edema bilaterally. Skin: Skin color, texture, turgor normal.  No rashes or lesions. Neurologic:   AOAX3             Labs:   Recent Labs     10/04/22  0007 10/05/22  0445   WBC 6.3 7.4   HGB 9.3* 9.9*   HCT 31.0* 33.2*    228     Recent Labs     10/04/22  0007 10/05/22  0445 10/06/22  0220    139 137   K 3.8 4.1  4.1 4.3   CL 91* 90* 88*   CO2 39* 41* 41*   BUN 22 25* 29*   CREATININE 0.7 1.0 0.9   CALCIUM 9.4 9.4 9.1     Recent Labs     10/04/22  0007   AST 10   ALT 7   BILITOT <0.2   ALKPHOS 93     No results for input(s): INR in the last 72 hours. No results for input(s): Jordan Ebbs in the last 72 hours. Recent Labs     10/04/22  0007   AST 10   ALT 7   BILITOT <0.2   ALKPHOS 93     No results for input(s): LACTA in the last 72 hours.   Lab Results   Component Value Date    LABURIC 4.9 10/24/2016     No results found for: AMMONIA    Assessment:    Active Hospital Problems    Diagnosis Date Noted    Acute and chronic respiratory failure with hypercapnia ([facility] [J96.22] 10/04/2022     Priority: Medium    DM (diabetes mellitus) ([facility] [E11.9] 10/04/2022     Priority: Medium         Priority: Medium   LYMPH EDEMA   PAF   SOCIAL CRISIS   HLD   HTN   DEPRESSION            Plan:DC HOME   Electronically signed by Osmin Templeton DO on 10/6/2022 at 4:37 PM [facility]

## 2022-10-06 NOTE — CARE COORDINATION
Social work / Discharge Planning:        Awaiting negative covid result. Danita Miner will transport to [address] at 4:30pm.     Social work updated RN. Facility liaison made transport arrangements. Attempted to reach mahesh Dawn but voicemail box is full. [employer] compliant message left for mahesh [place] [employer] requesting return call for update.     Electronically signed by JADEN Cervantes on 10/6/2022 at 1:25 PM

## 2022-10-06 NOTE — CARE COORDINATION
Social work / Discharge Planning:          Plan is to return to [facility]. No precert needed. LENO and transport form completed. Will need negative covid result on day of discharge.     Electronically signed by JADEN Gaffney on 10/6/2022 at 12:25 PM

## 2022-10-13 NOTE — DISCHARGE SUMMARY
Hospitalist Discharge Summary    Patient ID: Lenin Sanabria   Patient : 1955  Patient's PCP: Kosta Munoz DO    Admit Date: 10/3/2022   Admitting Physician: Abbey Tafoya DO    Discharge Date:  10/13/2022   Discharge Physician: Abbey Tafoya DO   Discharge Condition: Stable  Discharge Disposition: Skilled Facility      Discharge Diagnoses: Active Hospital Problems    Diagnosis Date Noted    Acute and chronic respiratory failure with hypercapnia ([facility] [J96.22] 10/04/2022     Priority: Medium    DM (diabetes mellitus) ([facility] [E11.9] 10/04/2022     Priority: Medium   LYMPH EDEMA   PAF           Hospital course in brief:  74 y.o. female presented with SOB. SHE IS FROM [place] , AND A VERY POOR HISTORIAN. SHE HAS A KNOWN HISTORY OF COPD AND BECAME SOB SEVERAL HOURS BEFORE SHE CAME TO THE ER ACCORDING TO THE EMR. SHE WAS FOUND TO BE MORE HYPOXIC . HYPOXIC. MORE ALERT HIS AM PROCAL NEG. PHYSICAL EXAM:    BP (!) 136/58   Pulse 69   Temp 98.2 °F (36.8 °C) (Oral)   Resp 18   Ht 5' 4\" (1.626 m)   Wt 268 lb 9.6 oz (121.8 kg)   LMP  (LMP Unknown)   SpO2 100%   BMI 46.11 kg/m²     General appearance:  No apparent distress, appears  OLDER  THAN stated age   HEENT:  Normal cephalic, . Neck: Supple, with full range of motion. No jugular venous distention. Trachea midline. Respiratory:  Normal respiratory effort. DIMINISHED BILATERALLY i.  Cardiovascular:  Regular rate and rhythm   Abdomen: Soft, non-tender, non-distended with normal bowel sounds. Musculoskeletal:  No clubbing, cyanosis POS edema bilaterally. Skin: Skin color, texture, turgor normal.  No rashes or lesions. Neurologic:   AOAX3       Prior to Admission medications    Medication Sig Start Date End Date Taking?  Authorizing Provider   metoprolol succinate (TOPROL XL) 100 MG extended release tablet Take 1 tablet by mouth 2 times daily 10/6/22  Yes Heather Watkins DO   insulin lispro (HUMALOG) 100 UNIT/ML SOLN injection vial Inject 0-16 Units into the skin 3 times daily (with meals) 10/6/22  Yes Toya Frank DO   insulin lispro (HUMALOG) 100 UNIT/ML SOLN injection vial Inject 0-4 Units into the skin nightly 10/6/22  Yes Toya Frank DO   omega-3 acid ethyl esters (LOVAZA) 1 g capsule Take 2 capsules by mouth 2 times daily 10/6/22  Yes April Watkins DO   predniSONE (DELTASONE) 10 MG tablet 4 a day x 3 day, 3 a day x 3 days , 2 a day x 3 days , 1 a day x 3 days, the dc 10/6/22  Yes April Watkins DO   amiodarone (PACERONE) 100 MG tablet Take 100 mg by mouth daily   Yes Historical Provider, MD   pantoprazole (PROTONIX) 40 MG tablet Take 40 mg by mouth daily   Yes Historical Provider, MD   traZODone (DESYREL) 50 MG tablet Take 50 mg by mouth nightly   Yes Historical Provider, MD   oxyCODONE (OXYCONTIN) 10 MG extended release tablet Take 10 mg by mouth every 6 hours as needed for Pain.    Yes Historical Provider, MD   senna (SENOKOT) 8.6 MG tablet Take 2 tablets by mouth daily   Yes Historical Provider, MD   insulin glargine (LANTUS) 100 UNIT/ML injection vial Inject 30 Units into the skin 2 times daily *SEE OTHER ORDER*  Patient taking differently: Inject 44 Units into the skin 2 times daily *SEE OTHER ORDER* 3/22/22   Colton Baig MD   DULoxetine (CYMBALTA) 60 MG extended release capsule Take 60 mg by mouth nightly *SEE OTHER ORDER*    Historical Provider, MD   hydrOXYzine (ATARAX) 50 MG tablet Take 50 mg by mouth 3 times daily    Historical Provider, MD   ipratropium (ATROVENT) 0.02 % nebulizer solution Take 0.5 mg by nebulization 3 times daily *SEE OTHER ORDER*    Historical Provider, MD   ezetimibe (ZETIA) 10 MG tablet Take 1 tablet by mouth nightly 5/6/21   Colton Baig MD   acetaminophen (TYLENOL) 500 MG tablet Take 1,000 mg by mouth every 6 hours as needed for Pain Indications: -NTE: 3G/24HRS-     Historical Provider, MD   apixaban (ELIQUIS) 5 MG TABS tablet Take 5 mg by mouth 2 times daily Historical Provider, MD   bumetanide (BUMEX) 2 MG tablet Take 1 mg by mouth daily    Historical Provider, MD   gabapentin (NEURONTIN) 600 MG tablet Take 200 mg by mouth 3 times daily. Historical Provider, MD       Consults:   IP CONSULT TO INTERNAL MEDICINE  IP CONSULT TO HEART FAILURE NURSE/COORDINATOR            Discharge Instructions / Follow up:    Future Appointments   Date Time Provider Jason Muir   10/27/2022  9:00 AM Leandro Willis APRN - CNP [facility]       Continued appropriate risk factor modification of blood pressure, diabetes and serum lipids will remain essential to reducing risk of future atherosclerotic development    Activity: activity as tolerated    Significant labs:  CBC:   No results for input(s): WBC, RBC, HGB, HCT, MCV, RDW, PLT in the last 72 hours. BMP: No results for input(s): NA, K, CL, CO2, BUN, CREATININE, CA, MG, PHOS in the last 72 hours. LFT:  No results for input(s): PROT, ALB, ALKPHOS, ALT, AST, BILITOT, AMYLASE, LIPASE in the last 72 hours. PT/INR: No results for input(s): INR, APTT in the last 72 hours. BNP: No results for input(s): BNP in the last 72 hours.   Hgb A1C:   Lab Results   Component Value Date    LABA1C 7.6 (H) 03/09/2022     Folate and B12:   Lab Results   Component Value Date    GWJUOEIB88 1024 (H) 03/24/2022   ,   Lab Results   Component Value Date    FOLATE >20.0 03/24/2022     Thyroid Studies:   Lab Results   Component Value Date    TSH 5.650 (H) 03/24/2022       Urinalysis:    Lab Results   Component Value Date/Time    NITRU Negative 09/01/2021 04:15 AM    WBCUA NONE 08/18/2021 01:23 PM    BACTERIA MANY 08/18/2021 01:23 PM    RBCUA 1-3 08/18/2021 01:23 PM    BLOODU Negative 09/01/2021 04:15 AM    SPECGRAV 1.015 09/01/2021 04:15 AM    GLUCOSEU Negative 09/01/2021 04:15 AM       Imaging:  XR CHEST PORTABLE    Result Date: 10/3/2022  EXAMINATION: ONE XRAY VIEW OF THE CHEST 10/3/2022 11:18 pm COMPARISON: 03/28/2022 HISTORY: ORDERING SYSTEM PROVIDED HISTORY: Shortness of breath TECHNOLOGIST PROVIDED HISTORY: Reason for exam:->Shortness of breath FINDINGS: The lungs are without acute focal process. Low lung volumes. There is no effusion or pneumothorax. The cardiomediastinal silhouette is without acute process. The osseous structures are without acute process. No acute process. Discharge Medications:      Medication List        START taking these medications      * insulin lispro 100 UNIT/ML Soln injection vial  Commonly known as: HUMALOG  Inject 0-16 Units into the skin 3 times daily (with meals)     * insulin lispro 100 UNIT/ML Soln injection vial  Commonly known as: HUMALOG  Inject 0-4 Units into the skin nightly     predniSONE 10 MG tablet  Commonly known as: DELTASONE  4 a day x 3 day, 3 a day x 3 days , 2 a day x 3 days , 1 a day x 3 days, the dc           * This list has 2 medication(s) that are the same as other medications prescribed for you. Read the directions carefully, and ask your doctor or other care provider to review them with you. CHANGE how you take these medications      DULoxetine 60 MG extended release capsule  Commonly known as: CYMBALTA  What changed: Another medication with the same name was removed. Continue taking this medication, and follow the directions you see here. ipratropium 0.02 % nebulizer solution  Commonly known as: ATROVENT  What changed: Another medication with the same name was removed. Continue taking this medication, and follow the directions you see here.             CONTINUE taking these medications      acetaminophen 500 MG tablet  Commonly known as: TYLENOL     amiodarone 100 MG tablet  Commonly known as: PACERONE     apixaban 5 MG Tabs tablet  Commonly known as: ELIQUIS     bumetanide 2 MG tablet  Commonly known as: BUMEX     ezetimibe 10 MG tablet  Commonly known as: ZETIA  Take 1 tablet by mouth nightly     gabapentin 600 MG tablet  Commonly known as: NEURONTIN hydrOXYzine HCl 50 MG tablet  Commonly known as: ATARAX     metoprolol succinate 100 MG extended release tablet  Commonly known as: TOPROL XL  Take 1 tablet by mouth 2 times daily     omega-3 acid ethyl esters 1 g capsule  Commonly known as: LOVAZA  Take 2 capsules by mouth 2 times daily     oxyCODONE 10 MG extended release tablet  Commonly known as: OXYCONTIN     pantoprazole 40 MG tablet  Commonly known as: PROTONIX     senna 8.6 MG tablet  Commonly known as: SENOKOT     traZODone 50 MG tablet  Commonly known as: DESYREL            STOP taking these medications      amLODIPine 10 MG tablet  Commonly known as: NORVASC     calcium-vitamin D 500-200 MG-UNIT per tablet  Commonly known as: OSCAL-500     docusate sodium 100 MG capsule  Commonly known as: COLACE     nadolol 20 MG tablet  Commonly known as: CORGARD     polyethylene glycol 17 g packet  Commonly known as: GLYCOLAX     potassium chloride 20 MEQ packet  Commonly known as: KLOR-CON            ASK your doctor about these medications      insulin glargine 100 UNIT/ML injection vial  Commonly known as: LANTUS  Inject 30 Units into the skin 2 times daily *SEE OTHER ORDER*               Where to Get Your Medications        Information about where to get these medications is not yet available    Ask your nurse or doctor about these medications  insulin lispro 100 UNIT/ML Soln injection vial  insulin lispro 100 UNIT/ML Soln injection vial  metoprolol succinate 100 MG extended release tablet  omega-3 acid ethyl esters 1 g capsule  predniSONE 10 MG tablet         Time Spent on discharge is 45 minutes in the review of medications and discharge plan  and exam.    +++++++++++++++++++++++++++++++++++++++++++++++++  Suman Carreon, DO  [address], [place]  +++++++++++++++++++++++++++++++++++++++++++++++++  NOTE: This report was transcribed using voice recognition software.  Every effort was made to ensure accuracy; however, inadvertent computerized transcription errors may be present.

## 2022-10-27 ENCOUNTER — OFFICE VISIT (OUTPATIENT)
Dept: CARDIOLOGY CLINIC | Age: 67
End: 2022-10-27
Payer: MEDICARE

## 2022-10-27 VITALS
DIASTOLIC BLOOD PRESSURE: 60 MMHG | SYSTOLIC BLOOD PRESSURE: 110 MMHG | HEIGHT: 64 IN | RESPIRATION RATE: 20 BRPM | OXYGEN SATURATION: 93 % | HEART RATE: 130 BPM | WEIGHT: 256 LBS | BODY MASS INDEX: 43.71 KG/M2

## 2022-10-27 DIAGNOSIS — I50.33 ACUTE ON CHRONIC HEART FAILURE WITH PRESERVED EJECTION FRACTION (HCC): Primary | ICD-10-CM

## 2022-10-27 DIAGNOSIS — J96.22 ACUTE AND CHRONIC RESPIRATORY FAILURE WITH HYPERCAPNIA (HCC): ICD-10-CM

## 2022-10-27 PROCEDURE — G8427 DOCREV CUR MEDS BY ELIG CLIN: HCPCS | Performed by: NURSE PRACTITIONER

## 2022-10-27 PROCEDURE — 1111F DSCHRG MED/CURRENT MED MERGE: CPT | Performed by: NURSE PRACTITIONER

## 2022-10-27 PROCEDURE — 1090F PRES/ABSN URINE INCON ASSESS: CPT | Performed by: NURSE PRACTITIONER

## 2022-10-27 PROCEDURE — 3078F DIAST BP <80 MM HG: CPT | Performed by: NURSE PRACTITIONER

## 2022-10-27 PROCEDURE — 3017F COLORECTAL CA SCREEN DOC REV: CPT | Performed by: NURSE PRACTITIONER

## 2022-10-27 PROCEDURE — 93000 ELECTROCARDIOGRAM COMPLETE: CPT | Performed by: INTERNAL MEDICINE

## 2022-10-27 PROCEDURE — 99214 OFFICE O/P EST MOD 30 MIN: CPT | Performed by: NURSE PRACTITIONER

## 2022-10-27 PROCEDURE — 1123F ACP DISCUSS/DSCN MKR DOCD: CPT | Performed by: NURSE PRACTITIONER

## 2022-10-27 PROCEDURE — 1036F TOBACCO NON-USER: CPT | Performed by: NURSE PRACTITIONER

## 2022-10-27 PROCEDURE — 3074F SYST BP LT 130 MM HG: CPT | Performed by: NURSE PRACTITIONER

## 2022-10-27 PROCEDURE — G8400 PT W/DXA NO RESULTS DOC: HCPCS | Performed by: NURSE PRACTITIONER

## 2022-10-27 PROCEDURE — G8417 CALC BMI ABV UP PARAM F/U: HCPCS | Performed by: NURSE PRACTITIONER

## 2022-10-27 PROCEDURE — G8484 FLU IMMUNIZE NO ADMIN: HCPCS | Performed by: NURSE PRACTITIONER

## 2022-10-27 RX ORDER — BUMETANIDE 2 MG/1
2 TABLET ORAL DAILY
Qty: 30 TABLET | Refills: 3 | Status: SHIPPED | OUTPATIENT
Start: 2022-10-27

## 2022-10-27 NOTE — PROGRESS NOTES
[facility] Cardiology  Office Visit         Reason for Visit: Heart Failure     Primary Cardiologist: Dr. Scott Holcomb        History of Present Illness:     Ms. Magnolia Bustillo is a 77year old female with a PMHx of HFpEF, LVH, PAF, HTN, PFO, hx of TIA, DM, COPD with chronic o2 use, probable untreated sleep apnea (has failed to get sleep study). She recently presented to the emergency room with complaints of increased shortness of breath and hypoxia from SNF facility. She was admitted to the hospital for acute hypoxic failure in the setting of COPD exacerbation and acute heart failure. She was treated with IV Bumex as well as steroids and DuoNeb treatments. She was tachycardic during hospitalization and Toprol was titrated. She subjectively improved and was discharged to SNF facility. She presents today in post hospital follow-up, since discharge from the hospital she remains in SNF facility. She reportedly is taking her medications as prescribed, however today in office today she is in atrial fibrillation with RVR. She then admits to not taking her morning medications as she was afraid she would be peeing a lot with the diuretic. She is unsure if she took her Toprol/amiodarone. She denies any worsening shortness of breath, MADRID, dizziness, lightheadedness, near-syncope, syncope or strokelike symptoms. She reports good urinary response to oral diuretic with clear yellow urine production. She has chronic lower extremity lymphedema and although her lower extremities remain edematous, she denies any worsening. She denies any chest pain, pressure, heaviness, palpitations.       Patient Active Problem List    Diagnosis Date Noted    Anemia 12/16/2018     Priority: High    Acute and chronic respiratory failure with hypercapnia ([address] 10/04/2022     Priority: Medium    DM (diabetes mellitus) ([address] 10/04/2022     Priority: Medium    Anxiety and depression 11/06/2018     Priority: Low    Chronic anticoagulation 11/06/2018 Priority: Low    Sepsis ([address] 03/08/2022    Bilateral cellulitis of lower leg 07/05/2021    Ambulatory dysfunction 07/05/2021    Chronic respiratory failure with hypercapnia ([address] 07/05/2021    Moderate pulmonary hypertension ([address] 07/05/2021    QT prolongation 07/05/2021    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[address] 07/05/2021    Infection due to extended-spectrum beta-lactamase-producing Escherichia coli 05/03/2021    Atrial fibrillation ([address] 01/13/2021    Hyperglycemia 11/17/2020    Adrenal mass (HCC)     CHF (congestive heart failure), NYHA class I, acute on chronic, combined ([address] 08/15/2020    Abscess and cellulitis of gluteal region 05/29/2020    Chronic respiratory failure with hypoxia ([address] 05/26/2020    Ulcers of both lower legs ([address] 01/23/2020    Stress fracture of right fibula 12/24/2019    Atrial fibrillation, currently in sinus rhythm 12/24/2019    Gastroesophageal reflux disease without esophagitis 12/24/2019    Cellulitis 07/41/6366    Diastolic CHF, acute on chronic (HCC)     Acute hypoxemic respiratory failure ([address]     Morbid obesity with BMI of 45.0-49.9, adult ([address]     Acute diastolic congestive heart failure ([address] 12/15/2018    Septicemia ([address]     Cellulitis of left lower extremity     Poorly controlled diabetes mellitus ([address]     Lymphedema     Blood loss anemia     PAF (paroxysmal atrial fibrillation) ([address] - currently in SR 11/06/2018     May 2004, spontaneous conversion to sinus rhythm and sinus tachycardia on Cardizem. Not on coumadin d/t prior, bleeding, GIB, & anemia.       GI bleed 11/06/2018    COPD exacerbation ([address] 11/06/2018    Acute on chronic respiratory failure with hypoxia ([address] 11/06/2018    Chronic deep vein thrombosis (DVT) of distal vein of right lower extremity ([address] 08/01/2018    Dyspnea on exertion     Lymphedema of both lower extremities 04/13/2018    Chronic anemia 04/13/2018    Physical deconditioning 01/19/2018    Chronic diastolic heart failure ([facility] 01/15/2018    YEIMI (acute kidney injury) ([facility] 10/06/2017    PFO (patent foramen ovale) 08/12/2015    Positive hepatitis C antibody test 01/21/2015    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 No cardiac etiology seen. Hyperlipidemia 06/09/2011    Osteoarthritis 06/09/2011    Essential hypertension 04/15/2011    Depression 02/18/2011    Uncontrolled diabetes mellitus 11/12/2010     Past Medical/Surgical History:    BMI 50.3 on 1/15/2021  37 pack years quit in 2004  LLE DVT in remote past and was placed on coumadin for a period of time  HTN  HLD: on Pravachol  Lipitor myalgias   Chronic iron deficiency anemia on iron  GERD  T2DM insulin neuropathy. HgbA1c 10.2 on 1/15/2021  Anxiety/Depression  Adenosine Stress test 8/2008: No stress induced ischemia, EF 54%  PAF: Not on coumadin d/t Hx of GIB (2004) and chronic anemia. [id number] score at least 5: (DM, TIA, Female, HTN)  Questionable TIA (5/2015) with questionable PFO:  TTE with bubble study: 6/2/2015:  Mild asymmetric septal hypertrophy, Normal left ventricle size and systolic function, Stage II diastolic dysfunction, Patent foramen ovale with right-to-left shunt with valsalva only was visualized, Trace mitral regurgitation, RVSP is 37 mmHg. Per Dr. Ximena Stevenson: (5/22/2015-office visit note)-\"I personally viewed the echo images myself now. The images were poor & the atrial septum was not well seen. There is not a definite ASD. A bubble study was performed & was poor quality but the few bubbles that were there did not cross the atrial septum.   The septum is probably aneurysmal & & thus may have a PFO but again the visualization was too poor. She states that Neuro was still not anum that it was a TIA. \"-Dr. Arreguin Loud recommended a repeat JIMENA or cardiac MRI-patient reported \"I didn't want the testing\". HFpEF  Admitted 01/15/18 with progressive edyspnea troponin normal kHUE=466 hest xray showed b/l effusion, BNP= 814 and respiratory acidosis on ABG x2. She was given DubNebs x3, Solumedrol and placed on BiPAP. CBC showed an anemic Hemoglobin = 8.2. IV diuresed ~2.5 L. TTE 2018, Dr. Mor Barraza:  Mild concentric left ventricular hypertrophy. EF visually estimated at 55-65%. Stage II diastolic dysfunction. The left atrium is severely dilated. Elevated L atrial pressure. Mild mitral regurgitation. The aortic valve appears mildly sclerotic. Pulmonary hypertension is mild to moderate. No evidence of pericardial effusion. Untreated moderate SAM, previous AHI 23 , sleep study at [facility] 2018  COPD with chronic home O2 (2-4L NC): follows with Dr. Raymond Schuler. Chronic Lymphedema has home health wrap her legs 3 x a week  History of anal fissure s/p surgery at [facility], appendectomy, tonsillectomy, colonoscopy s./p polypectomy, L TKA, RLE fracture. GED  showing gastritis,   History of occipital neuralgia   Gait instability: uses a walker.    Chronically sleeps in recliner/couch        Past Medical History:   Diagnosis Date    (HFpEF) heart failure with preserved ejection fraction ([facility]     2018- echo- LVEF 55-65%    Anal fissure     Anemia 10/6/2017    Anxiety and depression     Arthritis     Asthma     Atrial fibrillation (HCC)     Eliquis    Blood transfusion     long time no reaction    CHF (congestive heart failure) (HCC)     Diastolic    COPD (chronic obstructive pulmonary disease) (HCC)     Disc disorder     DM2 (diabetes mellitus, type 2) ([facility]     on insulin    Fall due to stumbling 10/6/2017    GERD (gastroesophageal reflux disease)     History of blood transfusion     Hx of blood clots     Hyperlipidemia Hypertension     Inappropriate sinus tachycardia     LVH (left ventricular hypertrophy)     moderate    Lymphadenitis, chronic 4/13/2018    Occipital neuralgia 11/2/2011    Respiratory acidosis 10/7/2017    Sinus tachycardia 2/16/2015         Past Surgical History:   Procedure Laterality Date    ANOSCOPY  10/25/2006    injection of anal sphincter with Botox, Franklyn Ortiz/Timmy, [facility]    ANOSCOPY  4/9/2007    injection of anal sphincter with Botox, Dr. Brian Morris, [facility]    ANOSCOPY  6/13/2007    injection of anal sphincter with Botox, Franklyn Milligan [facility]    ANUS SURGERY  4/4/2006    Lateral sphincterotomy for chronic anal fissure, Dr. Aric Marte, [facility]  4/18/2012    anal exam and injection of Botox 100 units into anal sphincter, Laila Milligan, [address] [place] [address]    COLONOSCOPY  1/20/2004    cecal polyp, bx (no pathologic changes), Dr. Jennifer Trinidad, [facility]    COLONOSCOPY  8/11/2006    snare polypectomy terminal ileum polyp (granulation tissue polyp) and anal polyp (inflammatory/papilla), Dr. Brian Morris, [facility]    COLONOSCOPY  3/23/2012    bx/cauterization proximal ascending colon polyp, injection of anal sphincter with Botox, Dr. Brian Morris, [facility]    ENDOSCOPY, COLON, DIAGNOSTIC      FRACTURE SURGERY      R leg fracture with surgery for repair    INSERT MIDLINE CATHETER  3/18/2022         JOINT REPLACEMENT  2003    L knee    PICC LINE INSERTION NURSE  3/20/2022         MD DEBRIDEMENT, SKIN, SUB-Q TISSUE,MUSCLE,=<20 SQ CM Left 11/30/2018    LEFT LEG EXCISIONAL  DEBRIDEMENT WITH TISSUE BIOPSY performed by Qamar Garcia DPM at [facility]  1/20/2004    gastritis, bx (no H pylori), Dr. Jennifer rTinidad, [place] [address] ENDOSCOPY N/A 6/1/2018    EGD BIOPSY performed by Stella Ramos MD at [address] [place] [address] N/A 11/9/2018    EGD BIOPSY performed by Chyna Helms MD at [facility] ENDOSCOPY         Allergies   Allergen Reactions    Statins Myalgia and Rash        Social History: Former Smoker: quit in 2004: 1.5 PPD x 25 years. Denies alcohol and illicit drug use. Lives with her grandson and . Unable to ambulate up/down steps. Uses a walker for ambulation. Only able to ambulate 30 ft due to chronic MADRID. Unable to do household chores or walk around grocery story due to chronic MADRID. Drinks 1-3 cups of coffee per day. Outpatient Medications Marked as Taking for the 10/27/22 encounter (Office Visit) with RAUL Mora CNP   Medication Sig Dispense Refill    metoprolol succinate (TOPROL XL) 100 MG extended release tablet Take 1 tablet by mouth 2 times daily 30 tablet 3    insulin lispro (HUMALOG) 100 UNIT/ML SOLN injection vial Inject 0-16 Units into the skin 3 times daily (with meals)      insulin lispro (HUMALOG) 100 UNIT/ML SOLN injection vial Inject 0-4 Units into the skin nightly      omega-3 acid ethyl esters (LOVAZA) 1 g capsule Take 2 capsules by mouth 2 times daily 60 capsule 3    amiodarone (PACERONE) 100 MG tablet Take 100 mg by mouth daily      pantoprazole (PROTONIX) 40 MG tablet Take 40 mg by mouth daily      traZODone (DESYREL) 50 MG tablet Take 50 mg by mouth nightly      oxyCODONE (OXYCONTIN) 10 MG extended release tablet Take 10 mg by mouth every 6 hours as needed for Pain.       senna (SENOKOT) 8.6 MG tablet Take 2 tablets by mouth daily      insulin glargine (LANTUS) 100 UNIT/ML injection vial Inject 30 Units into the skin 2 times daily *SEE OTHER ORDER* (Patient taking differently: Inject 32 Units into the skin 2 times daily *SEE OTHER ORDER*) 10 mL 3    DULoxetine (CYMBALTA) 60 MG extended release capsule Take 60 mg by mouth nightly *SEE OTHER ORDER*      hydrOXYzine (ATARAX) 50 MG tablet Take 50 mg by mouth 3 times daily      ipratropium (ATROVENT) 0.02 % nebulizer solution Take 0.5 mg by nebulization 3 times daily *SEE OTHER ORDER*      ezetimibe (ZETIA) 10 MG tablet Take 1 tablet by mouth nightly 30 tablet 3    acetaminophen (TYLENOL) 500 MG tablet Take 1,000 mg by mouth every 6 hours as needed for Pain Indications: -NTE: 3G/24HRS-       apixaban (ELIQUIS) 5 MG TABS tablet Take 5 mg by mouth 2 times daily      bumetanide (BUMEX) 2 MG tablet Take 1 mg by mouth daily      gabapentin (NEURONTIN) 600 MG tablet Take 200 mg by mouth 3 times daily. Review of Systems:   Cardiac: As per HPI  General: No fever, chills, rigors  Pulmonary: As per HPI  HEENT: No visual disturbances, difficult swallowing  GI: No nausea, vomiting, abdominal pain  : No dysuria or hematuria  Endocrine: No thyroid disease or diabetes  Musculoskeletal: MEHTA x 4, no focal motor deficits  Skin: Intact, no rashes  Neuro/Psych: No headache or seizures        Weights: Wt Readings from Last 3 Encounters:   10/27/22 256 lb (116.1 kg)   10/06/22 268 lb 9.6 oz (121.8 kg)   03/14/22 (!) 330 lb 3.2 oz (149.8 kg)         Physical Examination:     Vitals:    10/27/22 0842   BP: 110/60   Pulse: (!) 130   Resp: 20   SpO2: 93%     CONSTITUTIONAL: Alert and oriented times 3, no acute distress and cooperative to examination with proper mood and affect. SKIN: Skin color, texture, turgor normal. No rashes or lesions. LYMPH: no cervical nodes, no inguinal nodes  HEENT: Head is normocephalic, atraumatic. EOMI, PERRLA. NECK: Supple, symmetrical, trachea midline, no adenopathy, thyroid symmetric, not enlarged and no tenderness, skin normal. Unable to assess   CHEST/LUNGS: chest symmetric with normal A/P diameter, normal respiratory rate and rhythm, lungs clear to auscultation without wheezes, rales or rhonchi. No accessory muscle use. Scars None   CARDIOVASCULAR: Heart sounds are normal.  Irregular rate and rhythm without murmur, gallop or rub. Normal S1 and S2. Carotid and femoral pulses 2+/4 and equal bilaterally. ABDOMEN: Obese.  No and Laparoscopic scar(s) present. Normal bowel sounds. No bruits. soft, nondistended, no masses or organomegaly. no evidence of hernia. Percussion: Normal without hepatosplenomegally. Tenderness: absent. RECTAL: deferred, not clinically indicated  NEUROLOGIC: There are no focalizing motor or sensory deficits. CN II-XII are grossly intact. Christine Balling EXTREMITIES: no cyanosis, no clubbing. 2+ bilateral lower extremity edema. Chronic lymphedema. All the following diagnostics were personally reviewed and interpreted by me. LAB DATA:     10/5/22 04:45 10/6/22 02:20   Sodium 139 137   Potassium 4.1  4.1 4.3   Chloride 90 (L) 88 (L)   CO2 41 (HH) 41 (HH)   BUN,BUNPL 25 (H) 29 (H)   Creatinine 1.0 0.9   Anion Gap 8 8   GFR Non- 55 >60   GFR African American >60 >60   Glucose, Random 360 (H) 405 (H)   CALCIUM, SERUM, 623418 9.4 9.1   Procalcitonin 0.06    Meter Glucose     WBC 7.4    RBC 3.88    Hemoglobin Quant 9.9 (L)    Hematocrit 33.2 (L)    MCV 85.6    MCH 25.5 (L)    MCHC 29.8 (L)    MPV 10.7    RDW 14.3    Platelet Count 675          DIAGNOSTICS:    CXR (10/3/2022)  FINDINGS:  The lungs are without acute focal process. Low lung volumes. There is no  effusion or pneumothorax. The cardiomediastinal silhouette is without acute  process. The osseous structures are without acute process. Impression:  No acute process. CARDIAC TESTING:      Stress (8/14/2008)    TTE (1/15/2018, Dr. Krystyna Dove)   Mild concentric left ventricular hypertrophy. Ejection fraction is visually estimated at 55-65%. Stage II diastolic dysfunction. The left atrium is severely dilated. Elevated L atrial pressure   Mild mitral regurgitation   The aortic valve appears mildly sclerotic. Pulmonary hypertension is mild to moderate . No evidence of pericardial effusion. Limited TTE (3/18/2022)  Summary   Left ventricle is normal in size . Moderate left ventricular concentric hypertrophy noted.    No regional wall motion abnormalities seen. The left atrium is mildly dilated. Interatrial septum appears intact. Structurally normal mitral valve. Moderate mitral annular calcification. The aortic valve appears mildly sclerotic   Mild tricuspid regurgitation. Pulmonary hypertension is mild . EKG  Atrial flutter-fibrillation   Nonspecific QRS widening. ASSESSMENT:  1. Acute on chronic HFpEF  2. ACC stage C. NYHA FC III  3. Hypervolemic with reported improving lower extremity edema. 4. PAF - currently atrial fibrillation with RVR, rate 120-130  5. Chronic anticoagulation with Eliquis  6. Hypertension   7. Hyperlipidemia  8. Diabetes Mellitus - uncontrolled   9. Morbid obesity  10. Hx of TIA  11. COPD with ltot  12. Former tobacco use  13. Probable SAM - refused evaluation  14. Iron deficiency         PLAN:  Recommended emergency room evaluation due to Afib RVR. Patient reportedly did not take all of her morning medications - she would like to return to facility and take medication (including toprol and amiodarone) prior to going to the ED. Called and spoke with SNF facility if after taking her morning medication her HR does not improve she is to report to the ED. 2. Increase Bumex to 2 mg daily     3. Referral to CHF clinic in 1 week for close follow up     4. Discussed sleep study however patient not interested    5. Follow up with Dr. Trinidad Persaud in 1-2 months    6. Weigh yourself daily    -Stay Hydrated, 64 oz     -Diet should sodium restricted to 2 grams    -Again watch your daily weight trends and if you gain water weight please follow below instructions.    -If you gain 3-5 pounds in 2-3 days OR notice that you are retaining fluid in anyway just like you did before then take an extra dose of your water pill (Bumetanide/Bumex 2mg daily PRN) every day until you lose the weight or feel better.     -If you notice that you have taken more than 2 extra doses in 1 week then please call and let us know.     -If at any time you feel that you are retaining fluid, your medications are not working, or you feel ill in anyway, then please call us for either same day appointment or the next day, and for instructions. Our goal is to keep you out of the emergency room and the hospital and we have ways to do it. You just need to call us in a timely manner.     -If you become sick for other reasons, and notice that you are not urinating as much, the urine is very dark, you have significant diarrhea or vomiting, then please DO NOT take your water pill and CALL US immediately. Erlinda CARTER-CNP  [facility] Cardiology.

## 2022-10-27 NOTE — PATIENT INSTRUCTIONS
Patient reportedly did not take all of her morning medications - she would like to return to facility and take medication (including toprol and amiodarone) prior to going to the ED. Called and spoke with SNF facility if after taking her morning medication her HR does not improve she is to report to the ED. 2. Increase Bumex to 2 mg daily     3. Referral to CHF clinic in 1 week for close follow up     4. Discussed sleep study however patient not interested    5. Follow up with Dr. Lillie Sibley in 1-2 months    6. Weigh yourself daily    -Stay Hydrated, 64 oz     -Diet should sodium restricted to 2 grams    -Again watch your daily weight trends and if you gain water weight please follow below instructions.    -If you gain 3-5 pounds in 2-3 days OR notice that you are retaining fluid in anyway just like you did before then take an extra dose of your water pill (Bumetanide/Bumex 2mg daily PRN) every day until you lose the weight or feel better.     -If you notice that you have taken more than 2 extra doses in 1 week then please call and let us know. -If at any time you feel that you are retaining fluid, your medications are not working, or you feel ill in anyway, then please call us for either same day appointment or the next day, and for instructions. Our goal is to keep you out of the emergency room and the hospital and we have ways to do it. You just need to call us in a timely manner.     -If you become sick for other reasons, and notice that you are not urinating as much, the urine is very dark, you have significant diarrhea or vomiting, then please DO NOT take your water pill and CALL US immediately.

## 2022-11-08 ENCOUNTER — HOSPITAL ENCOUNTER (OUTPATIENT)
Dept: OTHER | Age: 67
Setting detail: THERAPIES SERIES
Discharge: HOME | End: 2022-11-08
Payer: MEDICARE

## 2022-11-08 VITALS
SYSTOLIC BLOOD PRESSURE: 121 MMHG | BODY MASS INDEX: 43.94 KG/M2 | RESPIRATION RATE: 18 BRPM | WEIGHT: 256 LBS | HEART RATE: 70 BPM | DIASTOLIC BLOOD PRESSURE: 56 MMHG | OXYGEN SATURATION: 100 %

## 2022-11-08 LAB
ANION GAP SERPL CALCULATED.3IONS-SCNC: 9 MMOL/L (ref 7–16)
BUN BLDV-MCNC: 23 MG/DL (ref 6–23)
CALCIUM SERPL-MCNC: 9.2 MG/DL (ref 8.6–10.2)
CHLORIDE BLD-SCNC: 87 MMOL/L (ref 98–107)
CO2: 37 MMOL/L (ref 22–29)
CREAT SERPL-MCNC: 0.9 MG/DL (ref 0.5–1)
GFR SERPL CREATININE-BSD FRML MDRD: >60 ML/MIN/1.73
GLUCOSE BLD-MCNC: 500 MG/DL (ref 74–99)
POTASSIUM SERPL-SCNC: 3.9 MMOL/L (ref 3.5–5)
PRO-BNP: 213 PG/ML (ref 0–125)
SODIUM BLD-SCNC: 133 MMOL/L (ref 132–146)

## 2022-11-08 PROCEDURE — 99214 OFFICE O/P EST MOD 30 MIN: CPT

## 2022-11-08 PROCEDURE — 80048 BASIC METABOLIC PNL TOTAL CA: CPT

## 2022-11-08 PROCEDURE — 83880 ASSAY OF NATRIURETIC PEPTIDE: CPT

## 2022-11-08 PROCEDURE — 36415 COLL VENOUS BLD VENIPUNCTURE: CPT

## 2022-11-08 NOTE — PROGRESS NOTES
Congestive Heart Failure [address] [facility]   1955          Referring Provider: [facility]  Primary Care Physician: Dr Attila Tsang  Cardiologist: Dr Jennifer Jaramillo  Nephrologist: N/A        History of Present Illness:     Medina Heredia is a 74 y.o. female with a history of HFpEF, most recent EF 65% on 3-18-22. Patient Story:    She does  have dyspnea with exertion, shortness of breath, or decline in overall functional capacity. She does not have orthopnea, PND, nocturnal cough or hemoptysis. She does not have abdominal distention or bloating, early satiety, anorexia/change in appetite. She does has a good urinary response to  oral diuretic. She does have  lower extremity edema. She denies lightheadedness, dizziness. She denies palpitations, syncope or near syncope. She does not complain of chest pain, pressure, discomfort. Allergies   Allergen Reactions    Statins Myalgia and Rash         No outpatient medications have been marked as taking for the 11/8/22 encounter [facility] Encounter) with [facility] ROOM 1.      Current Outpatient Medications on File Prior to Encounter   Medication Sig Dispense Refill    bumetanide (BUMEX) 2 MG tablet Take 1 tablet by mouth daily 30 tablet 3    metoprolol succinate (TOPROL XL) 100 MG extended release tablet Take 1 tablet by mouth 2 times daily (Patient taking differently: Take 100 mg by mouth 2 times daily Facility giving 200mg daily) 30 tablet 3    insulin lispro (HUMALOG) 100 UNIT/ML SOLN injection vial Inject 0-16 Units into the skin 3 times daily (with meals)      insulin lispro (HUMALOG) 100 UNIT/ML SOLN injection vial Inject 0-4 Units into the skin nightly      omega-3 acid ethyl esters (LOVAZA) 1 g capsule Take 2 capsules by mouth 2 times daily 60 capsule 3    amiodarone (PACERONE) 100 MG tablet Take 100 mg by mouth daily      pantoprazole (PROTONIX) 40 MG tablet Take 40 mg by mouth daily      traZODone (DESYREL) 50 MG tablet Take 50 mg by mouth nightly      oxyCODONE (OXYCONTIN) 10 MG extended release tablet Take 10 mg by mouth every 6 hours as needed for Pain. senna (SENOKOT) 8.6 MG tablet Take 2 tablets by mouth daily      insulin glargine (LANTUS) 100 UNIT/ML injection vial Inject 30 Units into the skin 2 times daily *SEE OTHER ORDER* (Patient taking differently: Inject 32 Units into the skin 2 times daily *SEE OTHER ORDER*) 10 mL 3    DULoxetine (CYMBALTA) 60 MG extended release capsule Take 60 mg by mouth nightly *SEE OTHER ORDER*      hydrOXYzine (ATARAX) 50 MG tablet Take 50 mg by mouth 3 times daily      ipratropium (ATROVENT) 0.02 % nebulizer solution Take 0.5 mg by nebulization 3 times daily *SEE OTHER ORDER*      ezetimibe (ZETIA) 10 MG tablet Take 1 tablet by mouth nightly 30 tablet 3    acetaminophen (TYLENOL) 500 MG tablet Take 1,000 mg by mouth every 6 hours as needed for Pain Indications: -NTE: 3G/24HRS-       apixaban (ELIQUIS) 5 MG TABS tablet Take 5 mg by mouth 2 times daily      gabapentin (NEURONTIN) 600 MG tablet Take 200 mg by mouth 3 times daily. No current facility-administered medications on file prior to encounter. Guideline directed medical:  ARNI/ACE I/ARB: No  Beta blocker:   Yes  Aldosterone antagonist:  No  SGLT2i:  No        Physical Examination:     BP (!) 121/56   Pulse 70   Resp 18   Wt 256 lb (116.1 kg)   LMP  (LMP Unknown)   SpO2 100%   BMI 43.94 kg/m²     Assessment  Charting Type: Shift assessment              HEENT (Head, Ears, Eyes, Nose, & Throat)  HEENT (WDL): Exceptions to WDL  Right Eye: Impaired vision  Left Eye: Impaired vision    Respiratory  Respiratory Pattern: Regular  Respiratory Depth: Normal  L Breath Sounds: Clear, Diminished  R Breath Sounds: Clear, Diminished              Cardiac  Cardiac Regularity: Regular  Cardiac Rhythm: Sinus rhythm    Rhythm Interpretation  Heart Rate: 70         Gastrointestinal  Abdominal (WDL): Exceptions to WDL  Abdomen Inspection: Rounded               Peripheral Vascular  Peripheral Vascular (WDL): Exceptions to WDL  Edema: Right lower extremity, Left lower extremity  RLE Edema: +2, Non-pitting (legs are wrapped)  LLE Edema: +2, Non-pitting (legs are wrapped)              Musculoskeletal  RL Extremity: Weakness  LL Extremity: Weakness                                  Heart Rate: 70                     LAB DATA:    Last 3 BMP      Sodium (mmol/L)   Date Value   10/06/2022 137   10/05/2022 139   10/04/2022 138     Potassium (mmol/L)   Date Value   10/06/2022 4.3   10/05/2022 4.1   10/04/2022 3.8     Potassium reflex Magnesium (mmol/L)   Date Value   10/05/2022 4.1   03/16/2022 3.6   03/08/2022 5.7 (H)     Chloride (mmol/L)   Date Value   10/06/2022 88 (L)   10/05/2022 90 (L)   10/04/2022 91 (L)     CO2 (mmol/L)   Date Value   10/06/2022 41 (HH)   10/05/2022 41 (HH)   10/04/2022 39 (H)     BUN (mg/dL)   Date Value   10/06/2022 29 (H)   10/05/2022 25 (H)   10/04/2022 22     Glucose (mg/dL)   Date Value   10/06/2022 405 (H)   10/05/2022 360 (H)   10/04/2022 287 (H)   12/16/2011 181 (H)   10/14/2011 277 (H)   07/08/2011 241 (H)     Calcium (mg/dL)   Date Value   10/06/2022 9.1   10/05/2022 9.4   10/04/2022 9.4       Last 3 BNP       Pro-BNP (pg/mL)   Date Value   10/04/2022 179 (H)   03/23/2022 2,773 (H)   03/16/2022 5,191 (H)          CBC: No results for input(s): WBC, HGB, PLT in the last 72 hours. BMP:  No results for input(s): NA, K, CL, CO2, BUN, CREATININE, GLUCOSE in the last 72 hours. Hepatic: No results for input(s): AST, ALT, ALB, BILITOT, ALKPHOS in the last 72 hours. Troponin: No results for input(s): TROPONINI in the last 72 hours. BNP: No results for input(s): BNP in the last 72 hours. Lipids: No results for input(s): CHOL, HDL in the last 72 hours. Invalid input(s): LDLCALCU  INR: No results for input(s): INR in the last 72 hours.         WEIGHTS:    Wt Readings from Last 3 Encounters: 11/08/22 256 lb (116.1 kg)   10/27/22 256 lb (116.1 kg)   10/06/22 268 lb 9.6 oz (121.8 kg)         TELEMETRY:  Cardiac Regularity: Regular  Cardiac Rhythm/Interpretation: NSR        ASSESSMENT:  Johny Duverney is having stable weights. Weights have been decreasing according to Flowsheet. Interventions completed this visit:  IV diuretics given no  Lab work obtained yes, BMP/BNP   Reviewed currently prescribed medications with patient, educated on importance of compliance and answered any questions regarding their medication  Educated on signs and symptoms of HF  Educated on low sodium diet    PLAN:  Scheduled to follow up in CHF clinic on   Future Appointments   Date Time Provider Jason Muir   11/17/2022 12:15 PM [facility] CHF ROOM 1 [facility]   12/8/2022 11:20 AM Niyah Min DO [address]    Patient states she's been urinating a lot with her diuretic. States she's feeling ok, and denies an increase in SOB. Reviewed low sodium diet. Patient states she's aware of need for low sodium. Given clinic phone number and aware of signs and symptoms to call with any HF change in symptoms.

## 2022-11-08 NOTE — PROGRESS NOTES
Deloris at Dr Susan Darby office notified of glucose of 500. Also, lab faxed with successful confirmation received.

## 2022-11-09 NOTE — RESULT ENCOUNTER NOTE
Labs and CHF clinic note reviewed  Elevated BGL    Please have patient start Jardiance vs Farxiga 10 mg daily     Return to CHF clinic as scheduled    Thank you

## 2022-11-10 ENCOUNTER — TELEPHONE (OUTPATIENT)
Dept: CARDIOLOGY CLINIC | Age: 67
End: 2022-11-10

## 2022-11-10 DIAGNOSIS — Z79.899 NEW MEDICATION ADDED: ICD-10-CM

## 2022-11-10 DIAGNOSIS — I50.33 ACUTE ON CHRONIC HEART FAILURE WITH PRESERVED EJECTION FRACTION (HCC): Primary | ICD-10-CM

## 2022-11-10 NOTE — TELEPHONE ENCOUNTER
Belkys Bunch is on pre list for her insurance  : [phone] [facility] was called . Nurse was not available station 2  Fax: obtained from / [address] CNP instructions and copy of script.      Future Appointments   Date Time Provider Jason Muir   11/17/2022 12:15 PM [facility] ROOM 1 [facility]   12/8/2022 11:20 AM DO Vignesh Sevilla [facility]

## 2022-11-10 NOTE — TELEPHONE ENCOUNTER
----- Message from RAUL David CNP sent at 11/9/2022  4:37 PM EST -----  Labs and CHF clinic note reviewed  Elevated BGL    Please have patient start Jardiance vs Farxiga 10 mg daily     Return to CHF clinic as scheduled    Thank you

## 2022-11-17 ENCOUNTER — HOSPITAL ENCOUNTER (OUTPATIENT)
Dept: OTHER | Age: 67
Setting detail: THERAPIES SERIES
Discharge: HOME | End: 2022-11-17
Payer: MEDICARE

## 2022-11-17 VITALS
SYSTOLIC BLOOD PRESSURE: 122 MMHG | OXYGEN SATURATION: 97 % | RESPIRATION RATE: 18 BRPM | DIASTOLIC BLOOD PRESSURE: 58 MMHG | HEART RATE: 69 BPM | BODY MASS INDEX: 44.11 KG/M2 | WEIGHT: 257 LBS

## 2022-11-17 LAB
ANION GAP SERPL CALCULATED.3IONS-SCNC: 9 MMOL/L (ref 7–16)
BUN BLDV-MCNC: 26 MG/DL (ref 6–23)
CALCIUM SERPL-MCNC: 10 MG/DL (ref 8.6–10.2)
CHLORIDE BLD-SCNC: 89 MMOL/L (ref 98–107)
CO2: 39 MMOL/L (ref 22–29)
CREAT SERPL-MCNC: 1 MG/DL (ref 0.5–1)
GFR SERPL CREATININE-BSD FRML MDRD: >60 ML/MIN/1.73
GLUCOSE BLD-MCNC: 207 MG/DL (ref 74–99)
POTASSIUM SERPL-SCNC: 3.7 MMOL/L (ref 3.5–5)
PRO-BNP: 525 PG/ML (ref 0–125)
SODIUM BLD-SCNC: 137 MMOL/L (ref 132–146)

## 2022-11-17 PROCEDURE — 83880 ASSAY OF NATRIURETIC PEPTIDE: CPT

## 2022-11-17 PROCEDURE — 80048 BASIC METABOLIC PNL TOTAL CA: CPT

## 2022-11-17 PROCEDURE — 99214 OFFICE O/P EST MOD 30 MIN: CPT

## 2022-11-17 RX ORDER — INSULIN ASPART 100 [IU]/ML
6 INJECTION, SOLUTION INTRAVENOUS; SUBCUTANEOUS
COMMUNITY

## 2022-11-17 NOTE — PROGRESS NOTES
Congestive Heart Failure [address] [facility]   1955          Referring Provider: [facility]  Primary Care Physician: Dr Cortez Morrell  Cardiologist: Dr Elisha Hunter  Nephrologist: N/A        History of Present Illness:     Nael Maya is a 74 y.o. female with a history of HFpEF, most recent EF 65% on 3-18-22. Patient Story:    She does  have dyspnea with exertion, shortness of breath, or decline in overall functional capacity. She does not have orthopnea, PND, nocturnal cough or hemoptysis. She does not have abdominal distention or bloating, early satiety, anorexia/change in appetite. She does has a good urinary response to  oral diuretic. She does have  lower extremity edema. She denies lightheadedness, dizziness. She denies palpitations, syncope or near syncope. She does not complain of chest pain, pressure, discomfort.          Allergies   Allergen Reactions    Statins Myalgia and Rash         Outpatient Medications Marked as Taking for the 11/17/22 encounter [facility] Encounter) with [facility] ROOM 1   Medication Sig Dispense Refill    insulin aspart (NOVOLOG FLEXPEN) 100 UNIT/ML injection pen Inject 6 Units into the skin 3 times daily (before meals)       Current Outpatient Medications on File Prior to Encounter   Medication Sig Dispense Refill    insulin aspart (NOVOLOG FLEXPEN) 100 UNIT/ML injection pen Inject 6 Units into the skin 3 times daily (before meals)      empagliflozin (JARDIANCE) 10 MG tablet Take 1 tablet by mouth daily 30 tablet 5    bumetanide (BUMEX) 2 MG tablet Take 1 tablet by mouth daily 30 tablet 3    metoprolol succinate (TOPROL XL) 100 MG extended release tablet Take 1 tablet by mouth 2 times daily (Patient taking differently: Take 100 mg by mouth 2 times daily Facility giving 200mg daily) 30 tablet 3    insulin lispro (HUMALOG) 100 UNIT/ML SOLN injection vial Inject 0-16 Units into the skin 3 times daily (with meals)      insulin lispro (HUMALOG) 100 UNIT/ML SOLN injection vial Inject 0-4 Units into the skin nightly (Patient not taking: Reported on 11/17/2022)      omega-3 acid ethyl esters (LOVAZA) 1 g capsule Take 2 capsules by mouth 2 times daily (Patient not taking: Reported on 11/17/2022) 60 capsule 3    amiodarone (PACERONE) 100 MG tablet Take 100 mg by mouth daily      pantoprazole (PROTONIX) 40 MG tablet Take 40 mg by mouth daily      traZODone (DESYREL) 50 MG tablet Take 50 mg by mouth nightly      oxyCODONE (OXYCONTIN) 10 MG extended release tablet Take 10 mg by mouth every 6 hours as needed for Pain. senna (SENOKOT) 8.6 MG tablet Take 2 tablets by mouth daily      insulin glargine (LANTUS) 100 UNIT/ML injection vial Inject 30 Units into the skin 2 times daily *SEE OTHER ORDER* (Patient taking differently: Inject 32 Units into the skin 2 times daily *SEE OTHER ORDER*) 10 mL 3    DULoxetine (CYMBALTA) 60 MG extended release capsule Take 60 mg by mouth nightly *SEE OTHER ORDER*      hydrOXYzine (ATARAX) 50 MG tablet Take 50 mg by mouth 3 times daily      ipratropium (ATROVENT) 0.02 % nebulizer solution Take 0.5 mg by nebulization 3 times daily *SEE OTHER ORDER*      ezetimibe (ZETIA) 10 MG tablet Take 1 tablet by mouth nightly 30 tablet 3    acetaminophen (TYLENOL) 500 MG tablet Take 1,000 mg by mouth every 6 hours as needed for Pain Indications: -NTE: 3G/24HRS-       apixaban (ELIQUIS) 5 MG TABS tablet Take 5 mg by mouth 2 times daily      gabapentin (NEURONTIN) 600 MG tablet Take 200 mg by mouth 3 times daily. No current facility-administered medications on file prior to encounter. Guideline directed medical:  ARNI/ACE I/ARB: No  Beta blocker:   Yes  Aldosterone antagonist:  No  SGLT2i:  yes Jardiance 10 mg        Physical Examination:     BP (!) 122/58   Pulse 69   Resp 18   Wt 257 lb (116.6 kg)   LMP  (LMP Unknown)   SpO2 97%   BMI 44.11 kg/m²     Assessment  Charting Type: Shift assessment HEENT (Head, Ears, Eyes, Nose, & Throat)  HEENT (WDL): Exceptions to WDL  Right Eye: Impaired vision  Left Eye: Impaired vision    Respiratory  Respiratory Pattern: Regular  Respiratory Depth: Normal  L Breath Sounds: Clear, Diminished  R Breath Sounds: Clear, Diminished              Cardiac  Cardiac Regularity: Regular  Cardiac Rhythm: Sinus rhythm    Rhythm Interpretation  Heart Rate: 69         Gastrointestinal  Abdominal (WDL): Exceptions to WDL  Abdomen Inspection: Rounded               Peripheral Vascular  Peripheral Vascular (WDL): Exceptions to WDL  Edema: Right lower extremity, Left lower extremity  RLE Edema: +2, Non-pitting (legs are wrapped)  LLE Edema: +2, Non-pitting (legs are wrapped)              Musculoskeletal  RL Extremity: Weakness  LL Extremity: Weakness                                  Heart Rate: 69                     LAB DATA:    Last 3 BMP      Sodium (mmol/L)   Date Value   11/08/2022 133   10/06/2022 137   10/05/2022 139     Potassium (mmol/L)   Date Value   11/08/2022 3.9   10/06/2022 4.3   10/05/2022 4.1     Potassium reflex Magnesium (mmol/L)   Date Value   10/05/2022 4.1   03/16/2022 3.6   03/08/2022 5.7 (H)     Chloride (mmol/L)   Date Value   11/08/2022 87 (L)   10/06/2022 88 (L)   10/05/2022 90 (L)     CO2 (mmol/L)   Date Value   11/08/2022 37 (H)   10/06/2022 41 (HH)   10/05/2022 41 (HH)     BUN (mg/dL)   Date Value   11/08/2022 23   10/06/2022 29 (H)   10/05/2022 25 (H)     Glucose (mg/dL)   Date Value   11/08/2022 500 (H)   10/06/2022 405 (H)   10/05/2022 360 (H)   12/16/2011 181 (H)   10/14/2011 277 (H)   07/08/2011 241 (H)     Calcium (mg/dL)   Date Value   11/08/2022 9.2   10/06/2022 9.1   10/05/2022 9.4       Last 3 BNP       Pro-BNP (pg/mL)   Date Value   11/08/2022 213 (H)   10/04/2022 179 (H)   03/23/2022 2,773 (H)          CBC: No results for input(s): WBC, HGB, PLT in the last 72 hours.   BMP:  No results for input(s): NA, K, CL, CO2, BUN, CREATININE, GLUCOSE in the last 72 hours. Hepatic: No results for input(s): AST, ALT, ALB, BILITOT, ALKPHOS in the last 72 hours. Troponin: No results for input(s): TROPONINI in the last 72 hours. BNP: No results for input(s): BNP in the last 72 hours. Lipids: No results for input(s): CHOL, HDL in the last 72 hours. Invalid input(s): LDLCALCU  INR: No results for input(s): INR in the last 72 hours. WEIGHTS:    Wt Readings from Last 3 Encounters:   11/17/22 257 lb (116.6 kg)   11/08/22 256 lb (116.1 kg)   10/27/22 256 lb (116.1 kg)         TELEMETRY:  Cardiac Regularity: Regular  Cardiac Rhythm/Interpretation: NSR        ASSESSMENT:  Corrina Fallon is having stable weights. Pt. Admits to voiding all day at least 6-8 times a day. Denies any discomfort. Re instructed on low sodium diet, pt verbalized understanding. Interventions completed this visit:  IV diuretics given no  Lab work obtained yes, BMP/BNP   Reviewed currently prescribed medications with patient, educated on importance of compliance and answered any questions regarding their medication  Educated on signs and symptoms of HF  Educated on low sodium diet    PLAN:  Scheduled to follow up in CHF clinic on   Future Appointments   Date Time Provider Jason Muir   12/6/2022  1:00 PM [facility] CHF ROOM 1 [facility]   12/8/2022 11:20 AM Mendez Morrow DO [address] [facility]      Given clinic phone number and aware of signs and symptoms to call with any HF change in symptoms.

## 2022-11-29 ENCOUNTER — APPOINTMENT (OUTPATIENT)
Dept: OTHER | Age: 67
End: 2022-11-29
Payer: MEDICARE

## 2022-12-06 ENCOUNTER — HOSPITAL ENCOUNTER (OUTPATIENT)
Dept: OTHER | Age: 67
Setting detail: THERAPIES SERIES
Discharge: HOME | End: 2022-12-06
Payer: MEDICARE

## 2022-12-06 VITALS
RESPIRATION RATE: 18 BRPM | DIASTOLIC BLOOD PRESSURE: 60 MMHG | OXYGEN SATURATION: 97 % | HEART RATE: 80 BPM | BODY MASS INDEX: 42.74 KG/M2 | SYSTOLIC BLOOD PRESSURE: 130 MMHG | WEIGHT: 249 LBS

## 2022-12-06 LAB
ANION GAP SERPL CALCULATED.3IONS-SCNC: 14 MMOL/L (ref 7–16)
BUN BLDV-MCNC: 24 MG/DL (ref 6–23)
CALCIUM SERPL-MCNC: 9.8 MG/DL (ref 8.6–10.2)
CHLORIDE BLD-SCNC: 89 MMOL/L (ref 98–107)
CO2: 34 MMOL/L (ref 22–29)
CREAT SERPL-MCNC: 1 MG/DL (ref 0.5–1)
GFR SERPL CREATININE-BSD FRML MDRD: >60 ML/MIN/1.73
GLUCOSE BLD-MCNC: 431 MG/DL (ref 74–99)
POTASSIUM SERPL-SCNC: 3.2 MMOL/L (ref 3.5–5)
PRO-BNP: 201 PG/ML (ref 0–125)
SODIUM BLD-SCNC: 137 MMOL/L (ref 132–146)

## 2022-12-06 PROCEDURE — 36415 COLL VENOUS BLD VENIPUNCTURE: CPT

## 2022-12-06 PROCEDURE — 99214 OFFICE O/P EST MOD 30 MIN: CPT

## 2022-12-06 PROCEDURE — 80048 BASIC METABOLIC PNL TOTAL CA: CPT

## 2022-12-06 PROCEDURE — 83880 ASSAY OF NATRIURETIC PEPTIDE: CPT

## 2022-12-06 RX ORDER — SPIRONOLACTONE 25 MG/1
12.5 TABLET ORAL DAILY
Status: DISCONTINUED | OUTPATIENT
Start: 2022-12-06 | End: 2022-12-06 | Stop reason: HOSPADM

## 2022-12-06 RX ORDER — SPIRONOLACTONE 25 MG/1
12.5 TABLET ORAL DAILY
COMMUNITY

## 2022-12-06 NOTE — PLAN OF CARE
Problem: Chronic Conditions and Co-morbidities  Goal: Patient's chronic conditions and co-morbidity symptoms are monitored and maintained or improved  Flowsheets (Taken 12/6/2022 7639)  Care Plan - Patient's Chronic Conditions and Co-Morbidity Symptoms are Monitored and Maintained or Improved: Monitor and assess patient's chronic conditions and comorbid symptoms for stability, deterioration, or improvement

## 2022-12-06 NOTE — RESULT ENCOUNTER NOTE
Labs and CHF clinic note reviewed  Spoke with Aviva Ruiz in CHF clinic  Instructed to start spironolactone 12.5 mg daily  Follow up labs at [facility] on Friday and fax to office    Thank you

## 2022-12-06 NOTE — PROGRESS NOTES
Congestive Heart Failure [address] [facility]   1955          Referring Provider: [facility]  Primary Care Physician: Dr Loyd Byrd  Cardiologist: Dr Eloina Martinez  Nephrologist: N/A        History of Present Illness:     Nick Alvarez is a 69 y.o. female with a history of HFpEF, most recent EF 65% on 3-18-22. Patient Story:    She does  have dyspnea with exertion, shortness of breath, or decline in overall functional capacity. She does not have orthopnea, PND, nocturnal cough or hemoptysis. She does not have abdominal distention or bloating, early satiety, anorexia/change in appetite. She does has a good urinary response to  oral diuretic. She does have  lower extremity edema. She denies lightheadedness, dizziness. She denies palpitations, syncope or near syncope. She does not complain of chest pain, pressure, discomfort. Allergies   Allergen Reactions    Statins Myalgia and Rash         No outpatient medications have been marked as taking for the 12/6/22 encounter [facility] Encounter) with [facility] ROOM 1.      Current Outpatient Medications on File Prior to Encounter   Medication Sig Dispense Refill    insulin aspart (NOVOLOG FLEXPEN) 100 UNIT/ML injection pen Inject 6 Units into the skin 3 times daily (before meals)      empagliflozin (JARDIANCE) 10 MG tablet Take 1 tablet by mouth daily 30 tablet 5    bumetanide (BUMEX) 2 MG tablet Take 1 tablet by mouth daily 30 tablet 3    metoprolol succinate (TOPROL XL) 100 MG extended release tablet Take 1 tablet by mouth 2 times daily (Patient taking differently: Take 100 mg by mouth 2 times daily Facility giving 200mg daily) 30 tablet 3    insulin lispro (HUMALOG) 100 UNIT/ML SOLN injection vial Inject 0-16 Units into the skin 3 times daily (with meals)      insulin lispro (HUMALOG) 100 UNIT/ML SOLN injection vial Inject 0-4 Units into the skin nightly (Patient not taking: No sig reported)      omega-3 acid ethyl esters (LOVAZA) 1 g capsule Take 2 capsules by mouth 2 times daily (Patient not taking: No sig reported) 60 capsule 3    amiodarone (PACERONE) 100 MG tablet Take 100 mg by mouth daily      pantoprazole (PROTONIX) 40 MG tablet Take 40 mg by mouth daily      traZODone (DESYREL) 50 MG tablet Take 50 mg by mouth nightly      oxyCODONE (OXYCONTIN) 10 MG extended release tablet Take 10 mg by mouth every 6 hours as needed for Pain. senna (SENOKOT) 8.6 MG tablet Take 2 tablets by mouth daily      insulin glargine (LANTUS) 100 UNIT/ML injection vial Inject 30 Units into the skin 2 times daily *SEE OTHER ORDER* (Patient taking differently: Inject 36 Units into the skin 2 times daily *SEE OTHER ORDER*) 10 mL 3    DULoxetine (CYMBALTA) 60 MG extended release capsule Take 60 mg by mouth nightly *SEE OTHER ORDER*      hydrOXYzine (ATARAX) 50 MG tablet Take 50 mg by mouth 3 times daily      ipratropium (ATROVENT) 0.02 % nebulizer solution Take 0.5 mg by nebulization 3 times daily *SEE OTHER ORDER*      ezetimibe (ZETIA) 10 MG tablet Take 1 tablet by mouth nightly 30 tablet 3    acetaminophen (TYLENOL) 500 MG tablet Take 1,000 mg by mouth every 6 hours as needed for Pain Indications: -NTE: 3G/24HRS-       apixaban (ELIQUIS) 5 MG TABS tablet Take 5 mg by mouth 2 times daily      gabapentin (NEURONTIN) 600 MG tablet Take 200 mg by mouth 3 times daily. No current facility-administered medications on file prior to encounter. Guideline directed medical:  ARNI/ACE I/ARB: No  Beta blocker:   Yes  Aldosterone antagonist:  No  SGLT2i:  yes Jardiance 10 mg        Physical Examination:     /60   Pulse 80   Resp 18   Wt 249 lb (112.9 kg)   LMP  (LMP Unknown)   SpO2 97%   BMI 42.74 kg/m²     Assessment  Charting Type: Shift assessment              HEENT (Head, Ears, Eyes, Nose, & Throat)  HEENT (WDL): Exceptions to WDL  Right Eye: Impaired vision  Left Eye: Impaired vision    Respiratory  Respiratory Pattern: Regular  Respiratory Depth: Normal  L Breath Sounds: Clear, Diminished  R Breath Sounds: Clear, Diminished              Cardiac  Cardiac Regularity: Regular  Cardiac Rhythm: Sinus rhythm    Rhythm Interpretation  Heart Rate: 80         Gastrointestinal  Abdominal (WDL): Exceptions to WDL  Abdomen Inspection: Rounded               Peripheral Vascular  Peripheral Vascular (WDL): Exceptions to WDL  Edema: Right lower extremity, Left lower extremity  RLE Edema: +2, Non-pitting (legs are wrapped)  LLE Edema: +2, Non-pitting (legs are wrapped)    Retired, Read Only Skin Color/Condition  Skin Color: Todd  Skin Condition/Temp: Warm, Dry, Flaky    Skin Integumentary   Skin Color: Todd  Skin Condition/Temp: Warm, Dry, Flaky  Skin Integrity: Redness  Location: BLE    Musculoskeletal  RL Extremity: Weakness  LL Extremity: Weakness                                  Heart Rate: 80                     LAB DATA:    Last 3 BMP      Sodium (mmol/L)   Date Value   11/17/2022 137   11/08/2022 133   10/06/2022 137     Potassium (mmol/L)   Date Value   11/17/2022 3.7   11/08/2022 3.9   10/06/2022 4.3     Potassium reflex Magnesium (mmol/L)   Date Value   10/05/2022 4.1   03/16/2022 3.6   03/08/2022 5.7 (H)     Chloride (mmol/L)   Date Value   11/17/2022 89 (L)   11/08/2022 87 (L)   10/06/2022 88 (L)     CO2 (mmol/L)   Date Value   11/17/2022 39 (H)   11/08/2022 37 (H)   10/06/2022 41 (HH)     BUN (mg/dL)   Date Value   11/17/2022 26 (H)   11/08/2022 23   10/06/2022 29 (H)     Glucose (mg/dL)   Date Value   11/17/2022 207 (H)   11/08/2022 500 (H)   10/06/2022 405 (H)   12/16/2011 181 (H)   10/14/2011 277 (H)   07/08/2011 241 (H)     Calcium (mg/dL)   Date Value   11/17/2022 10.0   11/08/2022 9.2   10/06/2022 9.1       Last 3 BNP       Pro-BNP (pg/mL)   Date Value   11/17/2022 525 (H)   11/08/2022 213 (H)   10/04/2022 179 (H)          CBC: No results for input(s): WBC, HGB, PLT in the last 72 hours.   BMP:  No results for input(s): NA, K, CL, CO2, BUN, CREATININE, GLUCOSE in the last 72 hours. Hepatic: No results for input(s): AST, ALT, ALB, BILITOT, ALKPHOS in the last 72 hours. Troponin: No results for input(s): TROPONINI in the last 72 hours. BNP: No results for input(s): BNP in the last 72 hours. Lipids: No results for input(s): CHOL, HDL in the last 72 hours. Invalid input(s): LDLCALCU  INR: No results for input(s): INR in the last 72 hours. WEIGHTS:    Wt Readings from Last 3 Encounters:   12/06/22 249 lb (112.9 kg)   11/17/22 257 lb (116.6 kg)   11/08/22 256 lb (116.1 kg)         TELEMETRY:  Cardiac Regularity: Regular  Cardiac Rhythm/Interpretation: NSR        ASSESSMENT:  Ewelina Haile HAS LOST 8 LBS FROM LAST VISIT. PT ADMITS TO VOIDING ALL DAY PALE YELLOW URINE. DENIES ANY DISCOMFORT. Interventions completed this visit:  IV diuretics given no  Lab work obtained yes, BMP/BNP   Reviewed currently prescribed medications with patient, educated on importance of compliance and answered any questions regarding their medication  Educated on signs and symptoms of HF  Educated on low sodium diet    PLAN:  Scheduled to follow up in CHF clinic on   Future Appointments   Date Time Provider Jason Muir   12/8/2022 11:20 Toño Ponce 17, DO [address] [facility]   2/1/2023 12:15 PM [facility] CHF ROOM 1 [facility] CHF Felecia Gentile      Given clinic phone number and aware of signs and symptoms to call with any HF change in symptoms.

## 2022-12-06 NOTE — PROGRESS NOTES
Ryan Bowen, APRN - CNP   Nurse Practitioner   Specialty:  Nurse Practitioner   Result Encounter Note       Signed   Date of Service:  12/6/2022  1:00 PM                 Signed                Labs and CHF clinic note reviewed  Spoke with Mauricio Guzmán in CHF clinic  Instructed to start spironolactone 12.5 mg daily  Follow up labs at [facility] on Friday and fax to office                 Spoke to Liyah Parks at [facility] and informed of above orders, also informed of blood sugar of 431. Dayan repeated orders and confirmed will cover blood sugar with sliding scale pt. Has. Orders faxed to [facility], confirmation received.

## 2022-12-08 ENCOUNTER — OFFICE VISIT (OUTPATIENT)
Dept: CARDIOLOGY CLINIC | Age: 67
End: 2022-12-08
Payer: MEDICARE

## 2022-12-08 VITALS
RESPIRATION RATE: 16 BRPM | HEART RATE: 65 BPM | SYSTOLIC BLOOD PRESSURE: 124 MMHG | HEIGHT: 63 IN | DIASTOLIC BLOOD PRESSURE: 60 MMHG | BODY MASS INDEX: 44.47 KG/M2 | WEIGHT: 251 LBS

## 2022-12-08 DIAGNOSIS — I48.0 PAF (PAROXYSMAL ATRIAL FIBRILLATION) (HCC): Primary | ICD-10-CM

## 2022-12-08 PROCEDURE — G8484 FLU IMMUNIZE NO ADMIN: HCPCS | Performed by: INTERNAL MEDICINE

## 2022-12-08 PROCEDURE — G8417 CALC BMI ABV UP PARAM F/U: HCPCS | Performed by: INTERNAL MEDICINE

## 2022-12-08 PROCEDURE — G8427 DOCREV CUR MEDS BY ELIG CLIN: HCPCS | Performed by: INTERNAL MEDICINE

## 2022-12-08 PROCEDURE — 3078F DIAST BP <80 MM HG: CPT | Performed by: INTERNAL MEDICINE

## 2022-12-08 PROCEDURE — 93000 ELECTROCARDIOGRAM COMPLETE: CPT | Performed by: INTERNAL MEDICINE

## 2022-12-08 PROCEDURE — G8400 PT W/DXA NO RESULTS DOC: HCPCS | Performed by: INTERNAL MEDICINE

## 2022-12-08 PROCEDURE — 99213 OFFICE O/P EST LOW 20 MIN: CPT | Performed by: INTERNAL MEDICINE

## 2022-12-08 PROCEDURE — 1123F ACP DISCUSS/DSCN MKR DOCD: CPT | Performed by: INTERNAL MEDICINE

## 2022-12-08 PROCEDURE — 3017F COLORECTAL CA SCREEN DOC REV: CPT | Performed by: INTERNAL MEDICINE

## 2022-12-08 PROCEDURE — 3074F SYST BP LT 130 MM HG: CPT | Performed by: INTERNAL MEDICINE

## 2022-12-08 PROCEDURE — 1036F TOBACCO NON-USER: CPT | Performed by: INTERNAL MEDICINE

## 2022-12-08 PROCEDURE — 1090F PRES/ABSN URINE INCON ASSESS: CPT | Performed by: INTERNAL MEDICINE

## 2022-12-08 NOTE — PROGRESS NOTES
[place]  1955  Date of Service: 12/8/2022    Patient Active Problem List    Diagnosis Date Noted    Anemia 12/16/2018     Priority: High    Acute and chronic respiratory failure with hypercapnia ([address] 10/04/2022     Priority: Medium    DM (diabetes mellitus) ([address] 10/04/2022     Priority: Medium    Anxiety and depression 11/06/2018     Priority: Low    Chronic anticoagulation 11/06/2018     Priority: Low    Sepsis ([address] 03/08/2022    Bilateral cellulitis of lower leg 07/05/2021    Ambulatory dysfunction 07/05/2021    Chronic respiratory failure with hypercapnia ([address] 07/05/2021    Moderate pulmonary hypertension ([address] 07/05/2021    QT prolongation 07/05/2021    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[address] 07/05/2021    Infection due to extended-spectrum beta-lactamase-producing Escherichia coli 05/03/2021    Atrial fibrillation ([address] 01/13/2021    Hyperglycemia 11/17/2020    Adrenal mass (HCC)     CHF (congestive heart failure), NYHA class I, acute on chronic, combined ([address] 08/15/2020    Abscess and cellulitis of gluteal region 05/29/2020    Chronic respiratory failure with hypoxia ([address] 05/26/2020    Ulcers of both lower legs ([address] 01/23/2020    Stress fracture of right fibula 12/24/2019    Atrial fibrillation, currently in sinus rhythm 12/24/2019    Gastroesophageal reflux disease without esophagitis 12/24/2019    Cellulitis 79/01/8050    Diastolic CHF, acute on chronic (HCC)     Acute hypoxemic respiratory failure ([address]     Morbid obesity with BMI of 45.0-49.9, adult ([address]     Acute diastolic congestive heart failure ([address] 12/15/2018    Septicemia ([address]     Cellulitis of left lower extremity     Poorly controlled diabetes mellitus ([address]     Lymphedema     Blood loss anemia     PAF (paroxysmal atrial fibrillation) ([address] - currently in SR 11/06/2018     Overview Note:     May 2004, spontaneous conversion to sinus rhythm and sinus tachycardia on Cardizem. Not on coumadin d/t prior, bleeding, GIB, & anemia.       GI bleed 2018    COPD exacerbation ([facility] 2018    Acute on chronic respiratory failure with hypoxia ([facility] 2018    Chronic deep vein thrombosis (DVT) of distal vein of right lower extremity ([facility] 2018    Dyspnea on exertion     Lymphedema of both lower extremities 2018    Chronic anemia 2018    Physical deconditioning 2018    Chronic diastolic heart failure ([facility] 01/15/2018    YEIMI (acute kidney injury) ([facility] 10/06/2017    PFO (patent foramen ovale) 2015    Positive hepatitis C antibody test 2015    Chronic anal fissure 2013    LVH (left ventricular hypertrophy)      Overview Note:     moderate      Iron deficiency 03/15/2013    Diabetic neuropathy ([facility] 2013    Franklin's cyst of knee 2013    Steatohepatitis 2012    Chronic pain 2012    Chronic sinusitis 2011    Pulmonary hypertension 10/13/2011     Overview Note:     Mild pulmonary hypertension with a pulmonary artery systolic pressure of 26-95 mmHg on echo 04. No cardiac etiology seen. Hyperlipidemia 2011    Osteoarthritis 2011    Essential hypertension 04/15/2011    Depression 2011    Uncontrolled diabetes mellitus 2010       Social History     Socioeconomic History    Marital status:      Number of children: 3    Years of education: 9    Highest education level: 9th grade   Occupational History    Occupation: SSD   Tobacco Use    Smoking status: Former     Packs/day: 1.50     Years: 25.00     Pack years: 37.50     Types: Cigarettes     Start date: 1979     Quit date: 2004     Years since quittin.9    Smokeless tobacco: Never    Tobacco comments:     Stopped smoking 16 years ago   Vaping Use    Vaping Use: Never used   Substance and Sexual Activity    Alcohol use: Not Currently     Comment: 1 can coke daily    Drug use: Not Currently    Sexual activity: Not Currently     Partners: Male   Social History Narrative    Denies caffeine. [employer] shops for her and helps around the house    Patient unable to exercise d/t mobility status     Patient lives with her  who has limited speech ability d/t hx of CVA. He is able to assist patient with IADLS       Current Outpatient Medications   Medication Sig Dispense Refill    spironolactone (ALDACTONE) 25 MG tablet Take 12.5 mg by mouth daily      insulin aspart (NOVOLOG FLEXPEN) 100 UNIT/ML injection pen Inject 6 Units into the skin 3 times daily (before meals)      empagliflozin (JARDIANCE) 10 MG tablet Take 1 tablet by mouth daily 30 tablet 5    bumetanide (BUMEX) 2 MG tablet Take 1 tablet by mouth daily 30 tablet 3    metoprolol succinate (TOPROL XL) 100 MG extended release tablet Take 1 tablet by mouth 2 times daily (Patient taking differently: Take 100 mg by mouth 2 times daily Facility giving 200mg daily) 30 tablet 3    insulin lispro (HUMALOG) 100 UNIT/ML SOLN injection vial Inject 0-16 Units into the skin 3 times daily (with meals)      amiodarone (PACERONE) 100 MG tablet Take 100 mg by mouth daily      pantoprazole (PROTONIX) 40 MG tablet Take 40 mg by mouth daily      traZODone (DESYREL) 50 MG tablet Take 50 mg by mouth nightly      oxyCODONE (OXYCONTIN) 10 MG extended release tablet Take 10 mg by mouth every 6 hours as needed for Pain.       senna (SENOKOT) 8.6 MG tablet Take 2 tablets by mouth daily      insulin glargine (LANTUS) 100 UNIT/ML injection vial Inject 30 Units into the skin 2 times daily *SEE OTHER ORDER* (Patient taking differently: Inject 36 Units into the skin 2 times daily *SEE OTHER ORDER*) 10 mL 3    DULoxetine (CYMBALTA) 60 MG extended release capsule Take 60 mg by mouth nightly *SEE OTHER ORDER*      hydrOXYzine (ATARAX) 50 MG tablet Take 50 mg by mouth 3 times daily      ipratropium (ATROVENT) 0.02 % nebulizer solution Take 0.5 mg by nebulization 3 times daily *SEE OTHER ORDER*      ezetimibe (ZETIA) 10 MG tablet Take 1 tablet by mouth nightly 30 tablet 3    acetaminophen (TYLENOL) 500 MG tablet Take 1,000 mg by mouth every 6 hours as needed for Pain Indications: -NTE: 3G/24HRS-       apixaban (ELIQUIS) 5 MG TABS tablet Take 5 mg by mouth 2 times daily      gabapentin (NEURONTIN) 600 MG tablet Take 200 mg by mouth 3 times daily. insulin lispro (HUMALOG) 100 UNIT/ML SOLN injection vial Inject 0-4 Units into the skin nightly (Patient not taking: No sig reported)      omega-3 acid ethyl esters (LOVAZA) 1 g capsule Take 2 capsules by mouth 2 times daily (Patient not taking: No sig reported) 60 capsule 3     No current facility-administered medications for this visit. Allergies   Allergen Reactions    Statins Myalgia and Rash       Chief Complaint:  Omer Fontaine is here today for follow up and management/recomendations for LVH, PAF, inappropriate sinus tach, HTN. History of Present Illness: Omer Fontaine resides at a nursing home. She uses a wheelchair and a walker. She has severe oxygen requiring COPD. She feels that this is at her baseline. She denies any chest discomfort, orthopnea/PND. She has chronic severe lower extremity lymphedema that she states is not changed. She denies any presyncopal symptoms. REVIEW OF SYSTEMS:  As above. Patient does not complain of any fever, chills, nausea, vomiting or diarrhea. No focal, motor or neurological deficits. No changes in his/her vision, hearing, bowel or bladder habits. She is not known to have a history of thyroid problems. No recent nose bleeds. PHYSICAL EXAM:  Vitals:    12/08/22 1124   BP: 124/60   Pulse: 65   Resp: 16   Weight: 251 lb (113.9 kg)   Height: 5' 3\" (1.6 m)       GENERAL:  She is alert and oriented x 3, communicates well, in no distress. NECK:  Thick, short, difficult to examine. No masses, trachea is mid position. Supple, full ROM, no JVD or bruits. No palpable thyromegaly or lymphadenopathy. HEART: Distant. Regular rate and rhythm. Normal S1 and S2.  There is an S4

## 2023-01-04 NOTE — ED NOTES
Patient place on right side due to discomfort in the rectal area     Kellie Sinha RN  11/17/20 0576 T wave inversion in EKG Long QT interval Alcohol abuse

## 2023-02-01 ENCOUNTER — HOSPITAL ENCOUNTER (OUTPATIENT)
Dept: OTHER | Age: 68
Setting detail: THERAPIES SERIES
Discharge: HOME | End: 2023-02-01
Payer: MEDICAID

## 2023-02-01 VITALS
OXYGEN SATURATION: 100 % | WEIGHT: 251 LBS | DIASTOLIC BLOOD PRESSURE: 60 MMHG | RESPIRATION RATE: 18 BRPM | SYSTOLIC BLOOD PRESSURE: 126 MMHG | BODY MASS INDEX: 44.46 KG/M2 | HEART RATE: 86 BPM

## 2023-02-01 LAB
ANION GAP SERPL CALCULATED.3IONS-SCNC: 7 MMOL/L (ref 7–16)
BUN BLDV-MCNC: 23 MG/DL (ref 6–23)
CALCIUM SERPL-MCNC: 9.3 MG/DL (ref 8.6–10.2)
CHLORIDE BLD-SCNC: 91 MMOL/L (ref 98–107)
CO2: 39 MMOL/L (ref 22–29)
CREAT SERPL-MCNC: 0.8 MG/DL (ref 0.5–1)
GFR SERPL CREATININE-BSD FRML MDRD: >60 ML/MIN/1.73
GLUCOSE BLD-MCNC: 249 MG/DL (ref 74–99)
POTASSIUM SERPL-SCNC: 3.5 MMOL/L (ref 3.5–5)
PRO-BNP: 199 PG/ML (ref 0–125)
SODIUM BLD-SCNC: 137 MMOL/L (ref 132–146)

## 2023-02-01 PROCEDURE — 83880 ASSAY OF NATRIURETIC PEPTIDE: CPT

## 2023-02-01 PROCEDURE — 99214 OFFICE O/P EST MOD 30 MIN: CPT

## 2023-02-01 PROCEDURE — 36415 COLL VENOUS BLD VENIPUNCTURE: CPT

## 2023-02-01 PROCEDURE — 80048 BASIC METABOLIC PNL TOTAL CA: CPT

## 2023-02-01 NOTE — PLAN OF CARE
Problem: Chronic Conditions and Co-morbidities  Goal: Patient's chronic conditions and co-morbidity symptoms are monitored and maintained or improved  Flowsheets (Taken 2/1/2023 4049)  Care Plan - Patient's Chronic Conditions and Co-Morbidity Symptoms are Monitored and Maintained or Improved: Monitor and assess patient's chronic conditions and comorbid symptoms for stability, deterioration, or improvement

## 2023-02-01 NOTE — PROGRESS NOTES
Congestive Heart Failure [address] [facility]   1955          Referring Provider: [facility]  Primary Care Physician: Dr Sarah Beth Michele  Cardiologist: Dr Parmjit Yeh  Nephrologist: N/A        History of Present Illness:     Sharona Reyna is a 69 y.o. female with a history of HFpEF, most recent EF 65% on 3-18-22. Patient Story:    She does  have SLIGHT  dyspnea with exertion, shortness of breath, or decline in overall functional capacity. She does not have orthopnea, PND, nocturnal cough or hemoptysis. She does not have abdominal distention or bloating, early satiety, anorexia/change in appetite. She does has a good urinary response to  oral diuretic. She does have  lower extremity edema. She denies lightheadedness, dizziness. She denies palpitations, syncope or near syncope. She does not complain of chest pain, pressure, discomfort.          Allergies   Allergen Reactions    Statins Myalgia and Rash           Current Outpatient Medications on File Prior to Encounter   Medication Sig Dispense Refill    spironolactone (ALDACTONE) 25 MG tablet Take 12.5 mg by mouth daily (Patient not taking: No sig reported)      insulin aspart (NOVOLOG FLEXPEN) 100 UNIT/ML injection pen Inject 6 Units into the skin 3 times daily (before meals)      empagliflozin (JARDIANCE) 10 MG tablet Take 1 tablet by mouth daily 30 tablet 5    bumetanide (BUMEX) 2 MG tablet Take 1 tablet by mouth daily 30 tablet 3    metoprolol succinate (TOPROL XL) 100 MG extended release tablet Take 1 tablet by mouth 2 times daily (Patient taking differently: Take 100 mg by mouth 2 times daily Facility giving 200mg daily) 30 tablet 3    insulin lispro (HUMALOG) 100 UNIT/ML SOLN injection vial Inject 0-16 Units into the skin 3 times daily (with meals)      amiodarone (PACERONE) 100 MG tablet Take 100 mg by mouth daily      pantoprazole (PROTONIX) 40 MG tablet Take 40 mg by mouth daily      traZODone (DESYREL) 50 MG tablet Take 50 mg by mouth nightly      oxyCODONE (OXYCONTIN) 10 MG extended release tablet Take 10 mg by mouth every 6 hours as needed for Pain. senna (SENOKOT) 8.6 MG tablet Take 2 tablets by mouth daily      insulin glargine (LANTUS) 100 UNIT/ML injection vial Inject 30 Units into the skin 2 times daily *SEE OTHER ORDER* (Patient taking differently: Inject 42 Units into the skin 2 times daily *SEE OTHER ORDER*) 10 mL 3    DULoxetine (CYMBALTA) 60 MG extended release capsule Take 60 mg by mouth nightly *SEE OTHER ORDER*      hydrOXYzine (ATARAX) 50 MG tablet Take 50 mg by mouth 3 times daily      ipratropium (ATROVENT) 0.02 % nebulizer solution Take 0.5 mg by nebulization 3 times daily *SEE OTHER ORDER*      ezetimibe (ZETIA) 10 MG tablet Take 1 tablet by mouth nightly 30 tablet 3    acetaminophen (TYLENOL) 500 MG tablet Take 1,000 mg by mouth every 6 hours as needed for Pain Indications: -NTE: 3G/24HRS-       apixaban (ELIQUIS) 5 MG TABS tablet Take 5 mg by mouth 2 times daily      gabapentin (NEURONTIN) 600 MG tablet Take 200 mg by mouth 3 times daily. No current facility-administered medications on file prior to encounter. Guideline directed medical:  ARNI/ACE I/ARB: No  Beta blocker:   Yes  Aldosterone antagonist:  No  SGLT2i:  yes Jardiance 10 mg        Physical Examination:     /60   Pulse 86   Resp 18   Wt 251 lb (113.9 kg)   LMP  (LMP Unknown)   SpO2 100%   BMI 44.46 kg/m²     Assessment  Charting Type: Shift assessment              HEENT (Head, Ears, Eyes, Nose, & Throat)  HEENT (WDL): Exceptions to WDL  Right Eye: Impaired vision  Left Eye: Impaired vision    Respiratory  Respiratory Pattern: Regular  Respiratory Depth: Normal  L Breath Sounds: Clear, Diminished  R Breath Sounds: Clear, Diminished              Cardiac  Cardiac Regularity: Regular  Cardiac Rhythm: Sinus rhythm    Rhythm Interpretation  Heart Rate: 86         Gastrointestinal  Abdominal (WDL): Exceptions to WDL  Abdomen Inspection: Rounded               Peripheral Vascular  Peripheral Vascular (WDL): Exceptions to WDL  Edema: Right lower extremity, Left lower extremity  RLE Edema: +2, Non-pitting  LLE Edema: +2, Non-pitting    Retired, Read Only Skin Color/Condition  Skin Color: Todd  Skin Condition/Temp: Warm, Dry, Flaky    Skin Integumentary   Skin Color: Todd  Skin Condition/Temp: Warm, Dry, Flaky  Skin Integrity: Redness  Location: BLE    Musculoskeletal  RL Extremity: Weakness  LL Extremity: Weakness                                  Heart Rate: 86                     LAB DATA:    Last 3 BMP      Sodium (mmol/L)   Date Value   12/06/2022 137   11/17/2022 137   11/08/2022 133     Potassium (mmol/L)   Date Value   12/06/2022 3.2 (L)   11/17/2022 3.7   11/08/2022 3.9     Potassium reflex Magnesium (mmol/L)   Date Value   10/05/2022 4.1   03/16/2022 3.6   03/08/2022 5.7 (H)     Chloride (mmol/L)   Date Value   12/06/2022 89 (L)   11/17/2022 89 (L)   11/08/2022 87 (L)     CO2 (mmol/L)   Date Value   12/06/2022 34 (H)   11/17/2022 39 (H)   11/08/2022 37 (H)     BUN (mg/dL)   Date Value   12/06/2022 24 (H)   11/17/2022 26 (H)   11/08/2022 23     Glucose (mg/dL)   Date Value   12/06/2022 431 (H)   11/17/2022 207 (H)   11/08/2022 500 (H)   12/16/2011 181 (H)   10/14/2011 277 (H)   07/08/2011 241 (H)     Calcium (mg/dL)   Date Value   12/06/2022 9.8   11/17/2022 10.0   11/08/2022 9.2       Last 3 BNP       Pro-BNP (pg/mL)   Date Value   12/06/2022 201 (H)   11/17/2022 525 (H)   11/08/2022 213 (H)          CBC: No results for input(s): WBC, HGB, PLT in the last 72 hours. BMP:  No results for input(s): NA, K, CL, CO2, BUN, CREATININE, GLUCOSE in the last 72 hours. Hepatic: No results for input(s): AST, ALT, ALB, BILITOT, ALKPHOS in the last 72 hours. Troponin: No results for input(s): TROPONINI in the last 72 hours. BNP: No results for input(s): BNP in the last 72 hours.   Lipids: No results for input(s): CHOL, HDL in the last 72 hours. Invalid input(s): LDLCALCU  INR: No results for input(s): INR in the last 72 hours. WEIGHTS:    Wt Readings from Last 3 Encounters:   02/01/23 251 lb (113.9 kg)   12/08/22 251 lb (113.9 kg)   12/06/22 249 lb (112.9 kg)         TELEMETRY:  Cardiac Regularity: Regular  Cardiac Rhythm/Interpretation: NSR        ASSESSMENT:  Imagene Matthew WEIGHT IS STABLE, DENIES ANY DISCOMFORT. ADMITS TO VOIDING ALL DAY PALE YELLOW URINE WHEN TAKING ORAL DIURETIC. FACILITY WILL CALL FOR EARLIER APPT. IF NEEDED. DR. Alphonso Devine FROM NURSING FACILITY DISCONTINUED  SPIRONOLACTONE. Interventions completed this visit:  IV diuretics given no  Lab work obtained yes, BMP/BNP   Reviewed currently prescribed medications with patient, educated on importance of compliance and answered any questions regarding their medication  Educated on signs and symptoms of HF  Educated on low sodium diet    PLAN:  Scheduled to follow up in CHF clinic on   Future Appointments   Date Time Provider Jason Muir   5/2/2023 12:15 PM [facility] CHF ROOM 1 [address]      Given clinic phone number and aware of signs and symptoms to call with any HF change in symptoms.

## 2023-03-08 ENCOUNTER — OFFICE VISIT (OUTPATIENT)
Dept: ENDOCRINOLOGY | Age: 68
End: 2023-03-08

## 2023-03-08 VITALS — HEIGHT: 64 IN | BODY MASS INDEX: 43.08 KG/M2 | RESPIRATION RATE: 18 BRPM | OXYGEN SATURATION: 95 %

## 2023-03-08 DIAGNOSIS — E11.65 TYPE 2 DIABETES MELLITUS WITH HYPERGLYCEMIA, WITH LONG-TERM CURRENT USE OF INSULIN (HCC): Primary | ICD-10-CM

## 2023-03-08 DIAGNOSIS — Z79.4 TYPE 2 DIABETES MELLITUS WITH HYPERGLYCEMIA, WITH LONG-TERM CURRENT USE OF INSULIN (HCC): Primary | ICD-10-CM

## 2023-03-08 DIAGNOSIS — E55.9 VITAMIN D DEFICIENCY: ICD-10-CM

## 2023-03-08 DIAGNOSIS — E27.8 ADRENAL INCIDENTALOMA (HCC): ICD-10-CM

## 2023-03-08 LAB — HBA1C MFR BLD: 10.9 %

## 2023-03-08 RX ORDER — DEXAMETHASONE 1 MG
1 TABLET ORAL ONCE
Qty: 1 TABLET | Refills: 0 | Status: SHIPPED | OUTPATIENT
Start: 2023-03-08 | End: 2023-03-08

## 2023-03-08 NOTE — PROGRESS NOTES
[address] [place] [address] Department of Endocrinology Diabetes and Metabolism   [address] [facility] [address]   Phone: [phone]  Fax: [phone]    Date of service: 3/8/2023  Primary Care Physician: Lindsey Gallegos DO  Consultant physician: Marley Carver MD     Reason for the consultation:  Uncontrolled DM type 2     History of Present Illness:  Services were provided through a video synchronous discussion     Bruna Blackburn is a 69 y.o. old female with PMH of DM type 2, lymphedema with chronic wounds, COPD,on Ox, HTN, Afib seen today for follow up visit     The patient was diagnosed with type 2 DM at the age 38. She is currently on Jardiance 10 mg daily, Lantus 42 U BID, Humalog 35U with meals + ss 3:50>150. Patient still not consistent carbohydrate meals, self-blood glucose monitoring has been variable. In addition, patient reported h/o neuropathy.    Lab Results   Component Value Date/Time    LABA1C 10.9 03/08/2023 09:44 AM     Past medical history:   Past Medical History:   Diagnosis Date    (HFpEF) heart failure with preserved ejection fraction ([facility]     1/16/2018- echo- LVEF 55-65%    Anal fissure     Anemia 10/6/2017    Anxiety and depression     Arthritis     Asthma     Atrial fibrillation (HCC)     Eliquis    Blood transfusion     long time no reaction    CHF (congestive heart failure) (HCC)     Diastolic    COPD (chronic obstructive pulmonary disease) ([facility]     Disc disorder     DM2 (diabetes mellitus, type 2) ([facility]     on insulin    Fall due to stumbling 10/6/2017    GERD (gastroesophageal reflux disease)     History of blood transfusion     Hx of blood clots     Hyperlipidemia     Hypertension     Inappropriate sinus tachycardia     LVH (left ventricular hypertrophy)     moderate    Lymphadenitis, chronic 4/13/2018    Occipital neuralgia 11/2/2011    Respiratory acidosis 10/7/2017    Sinus tachycardia 2/16/2015       Past surgical history:  Past Surgical History: Procedure Laterality Date    ANOSCOPY  10/25/2006    injection of anal sphincter with Botox, Franklyn Ortiz/Timmy, [facility]    ANOSCOPY  4/9/2007    injection of anal sphincter with Botox, Dr. Gerhard Mcdonough, [facility]    ANOSCOPY  6/13/2007    injection of anal sphincter with Botox, Franklyn Styles [facility]    ANUS SURGERY  4/4/2006    Lateral sphincterotomy for chronic anal fissure, Dr. Yani Angel, [address]  4/18/2012    anal exam and injection of Botox 100 units into anal sphincter, Laila Styles, [facility]    COLONOSCOPY  1/20/2004    cecal polyp, bx (no pathologic changes), Dr. Jonathan Galvan, [facility]    COLONOSCOPY  8/11/2006    snare polypectomy terminal ileum polyp (granulation tissue polyp) and anal polyp (inflammatory/papilla), Dr. Gerhard Mcdonough, [facility]    COLONOSCOPY  3/23/2012    bx/cauterization proximal ascending colon polyp, injection of anal sphincter with Botox, Dr. Gerhard Mcdonough, [facility]    ENDOSCOPY, COLON, DIAGNOSTIC      FRACTURE SURGERY      R leg fracture with surgery for repair    INSERT MIDLINE CATHETER  3/18/2022         JOINT REPLACEMENT  2003    L knee    PICC LINE INSERTION NURSE  3/20/2022         [place] DEBRIDEMENT MUSCLE & FASCIA 20 SQ CM/< Left 11/30/2018    LEFT LEG EXCISIONAL  DEBRIDEMENT WITH TISSUE BIOPSY performed by Ismael Jones DPM at [address] [place] [address]  1/20/2004    gastritis, bx (no H pylori), Dr. Jonathan Galvan, [facility] ENDOSCOPY N/A 6/1/2018    EGD BIOPSY performed by Tram Calle MD at [facility] 13 N/A 11/9/2018    EGD BIOPSY performed by Tram Calle MD at [facility] history:   Tobacco:   reports that she quit smoking about 18 years ago. Her smoking use included cigarettes. She started smoking about 43 years ago. She has a 37.50 pack-year smoking history.  She has never used smokeless tobacco.  Alcohol:   reports that she does not currently use alcohol. Drugs:   reports that she does not currently use drugs. Family history:    Family History   Problem Relation Age of Onset    Heart Disease Mother     Diabetes Father     Colon Cancer Father     Other Father         BLINDNESS    Colon Cancer Sister     Other Sister         shingles- has had over 1 year    Diabetes Paternal Aunt     Diabetes Paternal Uncle     Diabetes Paternal Aunt     Diabetes Paternal Uncle     Diabetes Sister        Allergy and drug reactions: Allergies   Allergen Reactions    Statins Myalgia and Rash       Scheduled Meds  Current Outpatient Medications on File Prior to Visit   Medication Sig Dispense Refill    insulin aspart (NOVOLOG FLEXPEN) 100 UNIT/ML injection pen Inject 6 Units into the skin 3 times daily (before meals)      spironolactone (ALDACTONE) 25 MG tablet Take 12.5 mg by mouth daily (Patient not taking: No sig reported)      empagliflozin (JARDIANCE) 10 MG tablet Take 1 tablet by mouth daily 30 tablet 5    bumetanide (BUMEX) 2 MG tablet Take 1 tablet by mouth daily 30 tablet 3    metoprolol succinate (TOPROL XL) 100 MG extended release tablet Take 1 tablet by mouth 2 times daily (Patient taking differently: Take 100 mg by mouth 2 times daily Facility giving 200mg daily) 30 tablet 3    insulin lispro (HUMALOG) 100 UNIT/ML SOLN injection vial Inject 0-16 Units into the skin 3 times daily (with meals) (Patient not taking: Reported on 3/8/2023)      amiodarone (PACERONE) 100 MG tablet Take 100 mg by mouth daily      pantoprazole (PROTONIX) 40 MG tablet Take 40 mg by mouth daily      traZODone (DESYREL) 50 MG tablet Take 50 mg by mouth nightly      oxyCODONE (OXYCONTIN) 10 MG extended release tablet Take 10 mg by mouth every 6 hours as needed for Pain.       senna (SENOKOT) 8.6 MG tablet Take 2 tablets by mouth daily      insulin glargine (LANTUS) 100 UNIT/ML injection vial Inject 30 Units into the skin 2 times daily *SEE OTHER ORDER* (Patient taking differently: Inject 42 Units into the skin 2 times daily *SEE OTHER ORDER*) 10 mL 3    DULoxetine (CYMBALTA) 60 MG extended release capsule Take 60 mg by mouth nightly *SEE OTHER ORDER*      hydrOXYzine (ATARAX) 50 MG tablet Take 50 mg by mouth 3 times daily      ipratropium (ATROVENT) 0.02 % nebulizer solution Take 0.5 mg by nebulization 3 times daily *SEE OTHER ORDER*      ezetimibe (ZETIA) 10 MG tablet Take 1 tablet by mouth nightly 30 tablet 3    acetaminophen (TYLENOL) 500 MG tablet Take 1,000 mg by mouth every 6 hours as needed for Pain Indications: -NTE: 3G/24HRS-       apixaban (ELIQUIS) 5 MG TABS tablet Take 5 mg by mouth 2 times daily      gabapentin (NEURONTIN) 600 MG tablet Take 200 mg by mouth 3 times daily. No current facility-administered medications on file prior to visit. Review of Systems  All systems reviewed. All negative except for symptoms mentioned in HPI     OBJECTIVE    Resp 18   Ht 5' 4\" (1.626 m)   LMP  (LMP Unknown)   SpO2 95%   BMI 43.08 kg/m²   No intake or output data in the 24 hours ending 03/08/23 1005    Physical examination:  General: awake alert, oriented x3  HEENT: normocephalic non traumatic, no exophthalmos   Neck: supple, thyroid tenderness,  Pulm: Clear equal air entry no added sounds  CVS: S1 + S2  Abd: soft lax, no tenderness  Skin: warm, no lesions, no rash.  Wound in both legs   Neuro: CN intact, sensation decreased bilateral , muscle power normal  Psych: normal mood, and affect    Review of Laboratory Data:  I personally reviewed the following labs:   Lab Results   Component Value Date/Time    WBC 7.4 10/05/2022 04:45 AM    RBC 3.88 10/05/2022 04:45 AM    HGB 9.9 (L) 10/05/2022 04:45 AM    HCT 33.2 (L) 10/05/2022 04:45 AM    MCV 85.6 10/05/2022 04:45 AM    MCH 25.5 (L) 10/05/2022 04:45 AM    MCHC 29.8 (L) 10/05/2022 04:45 AM    RDW 14.3 10/05/2022 04:45 AM     10/05/2022 04:45 AM    MPV 10.7 10/05/2022 04:45 AM      Lab Results   Component Value Date/Time     02/01/2023 01:20 PM    K 3.5 02/01/2023 01:20 PM    K 4.1 10/05/2022 04:45 AM    CO2 39 (H) 02/01/2023 01:20 PM    BUN 23 02/01/2023 01:20 PM    CREATININE 0.8 02/01/2023 01:20 PM    CALCIUM 9.3 02/01/2023 01:20 PM    LABGLOM >60 02/01/2023 01:20 PM    GFRAA >60 10/06/2022 02:20 AM      Lab Results   Component Value Date/Time    TSH 5.650 (H) 03/24/2022 03:42 AM    T4FREE 1.79 (H) 03/25/2022 04:00 AM     Lab Results   Component Value Date/Time    LABA1C 10.9 03/08/2023 09:44 AM    GLUCOSE 249 02/01/2023 01:20 PM    GLUCOSE 181 12/16/2011 11:06 AM    MALBCR <30 05/16/2018 02:42 PM    LABMICR <12.0 05/12/2017 02:52 PM    LABCREA 136 08/21/2021 12:15 PM     Lab Results   Component Value Date/Time    LABA1C 10.9 03/08/2023 09:44 AM    LABA1C 7.6 03/09/2022 04:30 AM    LABA1C 8.6 12/02/2021 01:14 PM     Lab Results   Component Value Date/Time    TRIG 298 01/16/2021 06:14 AM    HDL 52 01/16/2021 06:14 AM    LDLCALC 243 01/16/2021 06:14 AM    CHOL 355 01/16/2021 06:14 AM     Lab Results   Component Value Date/Time    VITD25 17 05/26/2020 12:00 PM    VITD25 9 11/07/2019 12:35 PM     ASSESSMENT & PLAN   Christianne Rhodes, a 69 y.o.-old female seen today for inpatient diabetes management     Diabetes Mellitus type 2  Poorly controlled   Will change insulin regimen to Lantus to 420units BID, Humalog 22 units with meals + sliding scale 3:50>150   check blood sugars 4 times a day before meals and at bedtime and fax the results to our office in a week. Discussed with patient A1c and blood sugar goals   Patient will need routine diabetes maintenance and prevention  Diabetes labs before next visit     Obesity  Discussed lifestyle changes including diet and exercise with patient in depth.  Also discussed with patient cardiovascular risk associated with obesity    Rt adrenal mass  CT adrenal  5/30/2020 --> high pre-contrast Hounsfield units (34) with late phase washout 49 Hounsfield units makings lesion undetermined. This lesion has increased size when comparison is made with previous study of April 2019. Increased size of the right adrenal gland lesion up to 3.4 x 3 cm,  Abdominal CT scan 8/2021 --> 4.4 cm Rt adrenal lesion with 33 Hounsfield units  Previous endocrine work up 5/2020, 8/2021  showed normal plasma metanephrines  Renin was 31.1 in 8/2021 but she was on spironolactone. , will contact PCP to hold Spironolactone for 5 weeks and recheck renin/Patel   1 mg DST suppressed to 3.1 in 5/2020 and ACTH wasn't suppressed at that time   Will obtain labs in few weeks     The above issues were reviewed with the patient who understood and agreed with the plan. Thank you for allowing us to participate in the care of this patient. Please do not hesitate to contact us with any additional questions. Diagnosis Orders   1. Type 2 diabetes mellitus with hyperglycemia, with long-term current use of insulin ([facility])  POCT glycosylated hemoglobin (Hb A1C)      2. Vitamin D deficiency        3. Adrenal incidentaloma ([address]  dexamethasone (DECADRON) 1 MG tablet    Cortisol Total    Aldosterone & Renin, Direct with Ratio    Metanephrines Plasma Free        Charly Rodrigues MD  Endocrinologist, [facility] Diabetes Care and Endocrinology   [address] [facility], [address] [place] [address],[address] [id number]   Phone: [phone]  Fax: [phone]  -------------------------  An electronic signature was used to authenticate this note.  Juaquin Plunkett MD on 3/8/2023 at 10:05 AM

## 2023-03-08 NOTE — PATIENT INSTRUCTIONS
Recommendations for today's visit  Change Lantus to 40 units twice a day   Take Humalog 22 units with meals + sliding scale   Blood sugar 150-200: add 3 Units of Humalog  Blood sugar 201-250 : Add 6 Units of Humalog  Blood sugar 251 - 300: Add 9 Units of Humalog  Blood sugar 301 - 350 : Add 12 Units of Humalog  Blood sugar >350 : Add 15 Units of Humalog    Ar bedtime, please use the following sliding scale   If blood sugars are 200-250 - take 2 units of Humalog   If blood sugars are 251-300 - take 3 units of Humalog   If blood sugars are 301-350 -take 4 units of Humalog   If blood sugars are above 350 - take 5 units of Humalog    Check Blood sugar 4 times/day before meals and at bedtime and send us sugar log in a week    I you have any questions please call Dr. Mariluz Mehta office     Nicole Mehta MD  Endocrinologist, [facility])    [address] [facility], [address] [place] [address],[facility] [id number]   Phone: [phone]  Fax: [phone]  Email: [email or web address]

## 2023-03-09 LAB — CORTISOL: 1.8

## 2023-03-15 ENCOUNTER — TELEPHONE (OUTPATIENT)
Dept: ENDOCRINOLOGY | Age: 68
End: 2023-03-15

## 2023-03-15 NOTE — TELEPHONE ENCOUNTER
Jariadne 10 mg daily  Lantus 44 unit bid   Humalog per SS     Daughter bring in snack , reg pepsi and the nurse stated she is just doing the sliding scale     Fax order to [phone]  station [address]

## 2023-03-15 NOTE — TELEPHONE ENCOUNTER
BS log reviewed. Please ask facility why pt is not taking Humalog base dose of 22 untis in addition to her SS. It is imperative for her to take the 22 units to avoid complications of her DM.  Daughter needs educated to not bring her mother sugary drinks

## 2023-03-27 ENCOUNTER — TELEPHONE (OUTPATIENT)
Dept: ENDOCRINOLOGY | Age: 68
End: 2023-03-27

## 2023-03-27 DIAGNOSIS — E27.8 ADRENAL INCIDENTALOMA (HCC): ICD-10-CM

## 2023-04-02 ENCOUNTER — HOSPITAL ENCOUNTER (EMERGENCY)
Age: 68
Discharge: HOME | End: 2023-04-03
Attending: EMERGENCY MEDICINE | Admitting: INTERNAL MEDICINE
Payer: MEDICARE

## 2023-04-02 ENCOUNTER — APPOINTMENT (OUTPATIENT)
Dept: GENERAL RADIOLOGY | Age: 68
End: 2023-04-02
Payer: MEDICARE

## 2023-04-02 DIAGNOSIS — I50.9 ACUTE ON CHRONIC CONGESTIVE HEART FAILURE, UNSPECIFIED HEART FAILURE TYPE (HCC): Primary | ICD-10-CM

## 2023-04-02 LAB
ALBUMIN SERPL-MCNC: 3.3 G/DL (ref 3.5–5.2)
ALP SERPL-CCNC: 99 U/L (ref 35–104)
ALT SERPL-CCNC: 10 U/L (ref 0–32)
ANION GAP SERPL CALCULATED.3IONS-SCNC: 6 MMOL/L (ref 7–16)
AST SERPL-CCNC: 14 U/L (ref 0–31)
BASOPHILS # BLD: 0.02 E9/L (ref 0–0.2)
BASOPHILS NFR BLD: 0.3 % (ref 0–2)
BILIRUB SERPL-MCNC: 0.2 MG/DL (ref 0–1.2)
BNP BLD-MCNC: 2622 PG/ML (ref 0–125)
BUN SERPL-MCNC: 35 MG/DL (ref 6–23)
CALCIUM SERPL-MCNC: 9.1 MG/DL (ref 8.6–10.2)
CHLORIDE SERPL-SCNC: 100 MMOL/L (ref 98–107)
CO2 SERPL-SCNC: 36 MMOL/L (ref 22–29)
CREAT SERPL-MCNC: 1 MG/DL (ref 0.5–1)
EOSINOPHIL # BLD: 0.14 E9/L (ref 0.05–0.5)
EOSINOPHIL NFR BLD: 2.3 % (ref 0–6)
ERYTHROCYTE [DISTWIDTH] IN BLOOD BY AUTOMATED COUNT: 15.4 FL (ref 11.5–15)
GLUCOSE SERPL-MCNC: 154 MG/DL (ref 74–99)
HCT VFR BLD AUTO: 34.6 % (ref 34–48)
HGB BLD-MCNC: 10.1 G/DL (ref 11.5–15.5)
IMM GRANULOCYTES # BLD: 0.03 E9/L
IMM GRANULOCYTES NFR BLD: 0.5 % (ref 0–5)
INFLUENZA A: NOT DETECTED
INFLUENZA B: NOT DETECTED
LYMPHOCYTES # BLD: 1.45 E9/L (ref 1.5–4)
LYMPHOCYTES NFR BLD: 23.5 % (ref 20–42)
MCH RBC QN AUTO: 23.9 PG (ref 26–35)
MCHC RBC AUTO-ENTMCNC: 29.2 % (ref 32–34.5)
MCV RBC AUTO: 81.8 FL (ref 80–99.9)
MONOCYTES # BLD: 0.42 E9/L (ref 0.1–0.95)
MONOCYTES NFR BLD: 6.8 % (ref 2–12)
NEUTROPHILS # BLD: 4.11 E9/L (ref 1.8–7.3)
NEUTS SEG NFR BLD: 66.6 % (ref 43–80)
PLATELET # BLD AUTO: 165 E9/L (ref 130–450)
PMV BLD AUTO: 9.6 FL (ref 7–12)
POTASSIUM SERPL-SCNC: 3.8 MMOL/L (ref 3.5–5)
PROT SERPL-MCNC: 5.8 G/DL (ref 6.4–8.3)
RBC # BLD AUTO: 4.23 E12/L (ref 3.5–5.5)
SARS-COV-2 RNA, RT PCR: NOT DETECTED
SODIUM SERPL-SCNC: 142 MMOL/L (ref 132–146)
TROPONIN, HIGH SENSITIVITY: 12 NG/L (ref 0–9)
TROPONIN, HIGH SENSITIVITY: 12 NG/L (ref 0–9)
WBC # BLD: 6.2 E9/L (ref 4.5–11.5)

## 2023-04-02 PROCEDURE — 99285 EMERGENCY DEPT VISIT HI MDM: CPT

## 2023-04-02 PROCEDURE — 71045 X-RAY EXAM CHEST 1 VIEW: CPT

## 2023-04-02 PROCEDURE — 6360000002 HC RX W HCPCS: Performed by: EMERGENCY MEDICINE

## 2023-04-02 PROCEDURE — 80053 COMPREHEN METABOLIC PANEL: CPT

## 2023-04-02 PROCEDURE — 36415 COLL VENOUS BLD VENIPUNCTURE: CPT

## 2023-04-02 PROCEDURE — 93005 ELECTROCARDIOGRAM TRACING: CPT | Performed by: EMERGENCY MEDICINE

## 2023-04-02 PROCEDURE — 85025 COMPLETE CBC W/AUTO DIFF WBC: CPT

## 2023-04-02 PROCEDURE — 96372 THER/PROPH/DIAG INJ SC/IM: CPT

## 2023-04-02 PROCEDURE — 83880 ASSAY OF NATRIURETIC PEPTIDE: CPT

## 2023-04-02 PROCEDURE — 87636 SARSCOV2 & INF A&B AMP PRB: CPT

## 2023-04-02 PROCEDURE — 84484 ASSAY OF TROPONIN QUANT: CPT

## 2023-04-02 RX ORDER — FUROSEMIDE 10 MG/ML
40 INJECTION INTRAMUSCULAR; INTRAVENOUS ONCE
Status: COMPLETED | OUTPATIENT
Start: 2023-04-02 | End: 2023-04-02

## 2023-04-02 RX ORDER — FERROUS SULFATE 325(65) MG
325 TABLET ORAL
COMMUNITY

## 2023-04-02 RX ADMIN — FUROSEMIDE 40 MG: 10 INJECTION, SOLUTION INTRAMUSCULAR; INTRAVENOUS at 23:09

## 2023-04-02 ASSESSMENT — PAIN SCALES - GENERAL: PAINLEVEL_OUTOF10: 7

## 2023-04-02 ASSESSMENT — PAIN DESCRIPTION - FREQUENCY: FREQUENCY: CONTINUOUS

## 2023-04-02 ASSESSMENT — PAIN - FUNCTIONAL ASSESSMENT
PAIN_FUNCTIONAL_ASSESSMENT: PREVENTS OR INTERFERES SOME ACTIVE ACTIVITIES AND ADLS
PAIN_FUNCTIONAL_ASSESSMENT: 0-10

## 2023-04-02 ASSESSMENT — PAIN DESCRIPTION - DESCRIPTORS: DESCRIPTORS: DISCOMFORT

## 2023-04-02 ASSESSMENT — PAIN DESCRIPTION - LOCATION: LOCATION: FOOT

## 2023-04-02 ASSESSMENT — PAIN DESCRIPTION - PAIN TYPE: TYPE: CHRONIC PAIN

## 2023-04-02 ASSESSMENT — PAIN DESCRIPTION - ORIENTATION: ORIENTATION: LEFT

## 2023-04-03 ENCOUNTER — HOSPITAL ENCOUNTER (INPATIENT)
Age: 68
LOS: 3 days | Discharge: HOSPICE | End: 2023-04-06
Attending: INTERNAL MEDICINE
Payer: MEDICARE

## 2023-04-03 ENCOUNTER — APPOINTMENT (OUTPATIENT)
Dept: ULTRASOUND IMAGING | Age: 68
End: 2023-04-03
Attending: INTERNAL MEDICINE
Payer: MEDICARE

## 2023-04-03 VITALS
TEMPERATURE: 98.2 F | RESPIRATION RATE: 16 BRPM | BODY MASS INDEX: 46.57 KG/M2 | DIASTOLIC BLOOD PRESSURE: 78 MMHG | SYSTOLIC BLOOD PRESSURE: 150 MMHG | OXYGEN SATURATION: 97 % | HEART RATE: 65 BPM | WEIGHT: 271.3 LBS

## 2023-04-03 PROBLEM — I50.9 ACUTE ON CHRONIC CONGESTIVE HEART FAILURE, UNSPECIFIED HEART FAILURE TYPE (HCC): Status: ACTIVE | Noted: 2023-04-03

## 2023-04-03 PROBLEM — I50.9 ACUTE ON CHRONIC CONGESTIVE HEART FAILURE (HCC): Status: ACTIVE | Noted: 2023-04-03

## 2023-04-03 LAB
ANISOCYTOSIS: ABNORMAL
BASOPHILIC STIPPLING: ABNORMAL
BASOPHILS # BLD: 0.02 E9/L (ref 0–0.2)
BASOPHILS NFR BLD: 0.3 % (ref 0–2)
EKG ATRIAL RATE: 86 BPM
EKG P AXIS: 67 DEGREES
EKG P-R INTERVAL: 170 MS
EKG Q-T INTERVAL: 398 MS
EKG QRS DURATION: 102 MS
EKG QTC CALCULATION (BAZETT): 476 MS
EKG R AXIS: 85 DEGREES
EKG T AXIS: 53 DEGREES
EKG VENTRICULAR RATE: 86 BPM
EOSINOPHIL # BLD: 0.14 E9/L (ref 0.05–0.5)
EOSINOPHIL NFR BLD: 2.2 % (ref 0–6)
ERYTHROCYTE [DISTWIDTH] IN BLOOD BY AUTOMATED COUNT: 15.5 FL (ref 11.5–15)
HCT VFR BLD AUTO: 36 % (ref 34–48)
HGB BLD-MCNC: 10.4 G/DL (ref 11.5–15.5)
HYPOCHROMIA: ABNORMAL
IMM GRANULOCYTES # BLD: 0.01 E9/L
IMM GRANULOCYTES NFR BLD: 0.2 % (ref 0–5)
LYMPHOCYTES # BLD: 1.47 E9/L (ref 1.5–4)
LYMPHOCYTES NFR BLD: 23.5 % (ref 20–42)
MCH RBC QN AUTO: 24.3 PG (ref 26–35)
MCHC RBC AUTO-ENTMCNC: 28.9 % (ref 32–34.5)
MCV RBC AUTO: 84.1 FL (ref 80–99.9)
METER GLUCOSE: 102 MG/DL (ref 74–99)
METER GLUCOSE: 114 MG/DL (ref 74–99)
METER GLUCOSE: 143 MG/DL (ref 74–99)
METER GLUCOSE: 149 MG/DL (ref 74–99)
MONOCYTES # BLD: 0.41 E9/L (ref 0.1–0.95)
MONOCYTES NFR BLD: 6.5 % (ref 2–12)
NEUTROPHILS # BLD: 4.21 E9/L (ref 1.8–7.3)
NEUTS SEG NFR BLD: 67.3 % (ref 43–80)
PLATELET # BLD AUTO: 184 E9/L (ref 130–450)
PMV BLD AUTO: 10 FL (ref 7–12)
POIKILOCYTES: ABNORMAL
POLYCHROMASIA: ABNORMAL
RBC # BLD AUTO: 4.28 E12/L (ref 3.5–5.5)
STOMATOCYTES: ABNORMAL
TROPONIN, HIGH SENSITIVITY: 15 NG/L (ref 0–9)
WBC # BLD: 6.3 E9/L (ref 4.5–11.5)

## 2023-04-03 PROCEDURE — 93010 ELECTROCARDIOGRAM REPORT: CPT | Performed by: INTERNAL MEDICINE

## 2023-04-03 PROCEDURE — 2060000000 HC ICU INTERMEDIATE R&B

## 2023-04-03 PROCEDURE — 84484 ASSAY OF TROPONIN QUANT: CPT

## 2023-04-03 PROCEDURE — 94664 DEMO&/EVAL PT USE INHALER: CPT

## 2023-04-03 PROCEDURE — 94640 AIRWAY INHALATION TREATMENT: CPT

## 2023-04-03 PROCEDURE — 6370000000 HC RX 637 (ALT 250 FOR IP): Performed by: NURSE PRACTITIONER

## 2023-04-03 PROCEDURE — 36415 COLL VENOUS BLD VENIPUNCTURE: CPT

## 2023-04-03 PROCEDURE — 2500000003 HC RX 250 WO HCPCS: Performed by: INTERNAL MEDICINE

## 2023-04-03 PROCEDURE — 93970 EXTREMITY STUDY: CPT

## 2023-04-03 PROCEDURE — 6360000002 HC RX W HCPCS: Performed by: NURSE PRACTITIONER

## 2023-04-03 PROCEDURE — 2580000003 HC RX 258: Performed by: NURSE PRACTITIONER

## 2023-04-03 PROCEDURE — 85025 COMPLETE CBC W/AUTO DIFF WBC: CPT

## 2023-04-03 PROCEDURE — 6370000000 HC RX 637 (ALT 250 FOR IP): Performed by: INTERNAL MEDICINE

## 2023-04-03 PROCEDURE — 82962 GLUCOSE BLOOD TEST: CPT

## 2023-04-03 PROCEDURE — 2500000003 HC RX 250 WO HCPCS: Performed by: NURSE PRACTITIONER

## 2023-04-03 RX ORDER — SENNA PLUS 8.6 MG/1
2 TABLET ORAL DAILY
Status: CANCELLED | OUTPATIENT
Start: 2023-04-03

## 2023-04-03 RX ORDER — SODIUM CHLORIDE 0.9 % (FLUSH) 0.9 %
5-40 SYRINGE (ML) INJECTION PRN
Status: CANCELLED | OUTPATIENT
Start: 2023-04-03

## 2023-04-03 RX ORDER — INSULIN LISPRO 100 [IU]/ML
6 INJECTION, SOLUTION INTRAVENOUS; SUBCUTANEOUS
Status: DISCONTINUED | OUTPATIENT
Start: 2023-04-03 | End: 2023-04-05

## 2023-04-03 RX ORDER — TRAZODONE HYDROCHLORIDE 50 MG/1
50 TABLET ORAL NIGHTLY
Status: CANCELLED | OUTPATIENT
Start: 2023-04-03

## 2023-04-03 RX ORDER — SODIUM CHLORIDE 9 MG/ML
INJECTION, SOLUTION INTRAVENOUS PRN
Status: CANCELLED | OUTPATIENT
Start: 2023-04-03

## 2023-04-03 RX ORDER — GABAPENTIN 100 MG/1
200 CAPSULE ORAL 3 TIMES DAILY
Status: CANCELLED | OUTPATIENT
Start: 2023-04-03

## 2023-04-03 RX ORDER — METOPROLOL TARTRATE 5 MG/5ML
5 INJECTION INTRAVENOUS ONCE
Status: COMPLETED | OUTPATIENT
Start: 2023-04-04 | End: 2023-04-04

## 2023-04-03 RX ORDER — BUMETANIDE 0.25 MG/ML
1 INJECTION INTRAMUSCULAR; INTRAVENOUS EVERY 12 HOURS
Status: CANCELLED | OUTPATIENT
Start: 2023-04-03

## 2023-04-03 RX ORDER — BISACODYL 5 MG/1
5 TABLET, DELAYED RELEASE ORAL DAILY PRN
Status: DISCONTINUED | OUTPATIENT
Start: 2023-04-03 | End: 2023-04-06 | Stop reason: HOSPADM

## 2023-04-03 RX ORDER — INSULIN GLARGINE 100 [IU]/ML
42 INJECTION, SOLUTION SUBCUTANEOUS 2 TIMES DAILY
Status: DISCONTINUED | OUTPATIENT
Start: 2023-04-03 | End: 2023-04-06 | Stop reason: HOSPADM

## 2023-04-03 RX ORDER — DEXTROSE MONOHYDRATE 100 MG/ML
INJECTION, SOLUTION INTRAVENOUS CONTINUOUS PRN
Status: CANCELLED | OUTPATIENT
Start: 2023-04-03

## 2023-04-03 RX ORDER — METOPROLOL SUCCINATE 100 MG/1
100 TABLET, EXTENDED RELEASE ORAL 2 TIMES DAILY
Status: DISCONTINUED | OUTPATIENT
Start: 2023-04-03 | End: 2023-04-06 | Stop reason: HOSPADM

## 2023-04-03 RX ORDER — BUMETANIDE 0.25 MG/ML
1 INJECTION INTRAMUSCULAR; INTRAVENOUS EVERY 12 HOURS SCHEDULED
Status: DISCONTINUED | OUTPATIENT
Start: 2023-04-03 | End: 2023-04-05

## 2023-04-03 RX ORDER — PANTOPRAZOLE SODIUM 40 MG/1
40 TABLET, DELAYED RELEASE ORAL DAILY
Status: DISCONTINUED | OUTPATIENT
Start: 2023-04-03 | End: 2023-04-06 | Stop reason: HOSPADM

## 2023-04-03 RX ORDER — EZETIMIBE 10 MG/1
10 TABLET ORAL NIGHTLY
Status: CANCELLED | OUTPATIENT
Start: 2023-04-03

## 2023-04-03 RX ORDER — EZETIMIBE 10 MG/1
10 TABLET ORAL NIGHTLY
Status: DISCONTINUED | OUTPATIENT
Start: 2023-04-03 | End: 2023-04-06 | Stop reason: HOSPADM

## 2023-04-03 RX ORDER — METOPROLOL SUCCINATE 25 MG/1
100 TABLET, EXTENDED RELEASE ORAL 2 TIMES DAILY
Status: CANCELLED | OUTPATIENT
Start: 2023-04-03

## 2023-04-03 RX ORDER — GABAPENTIN 100 MG/1
200 CAPSULE ORAL 3 TIMES DAILY
Status: DISCONTINUED | OUTPATIENT
Start: 2023-04-03 | End: 2023-04-06 | Stop reason: HOSPADM

## 2023-04-03 RX ORDER — BISACODYL 5 MG/1
5 TABLET, DELAYED RELEASE ORAL DAILY PRN
Status: CANCELLED | OUTPATIENT
Start: 2023-04-03

## 2023-04-03 RX ORDER — AMIODARONE HYDROCHLORIDE 200 MG/1
100 TABLET ORAL DAILY
Status: DISCONTINUED | OUTPATIENT
Start: 2023-04-03 | End: 2023-04-06 | Stop reason: HOSPADM

## 2023-04-03 RX ORDER — FERROUS SULFATE 325(65) MG
325 TABLET ORAL
Status: DISCONTINUED | OUTPATIENT
Start: 2023-04-03 | End: 2023-04-06 | Stop reason: HOSPADM

## 2023-04-03 RX ORDER — DULOXETIN HYDROCHLORIDE 60 MG/1
60 CAPSULE, DELAYED RELEASE ORAL NIGHTLY
Status: CANCELLED | OUTPATIENT
Start: 2023-04-03

## 2023-04-03 RX ORDER — DULOXETIN HYDROCHLORIDE 60 MG/1
60 CAPSULE, DELAYED RELEASE ORAL NIGHTLY
Status: DISCONTINUED | OUTPATIENT
Start: 2023-04-03 | End: 2023-04-06 | Stop reason: HOSPADM

## 2023-04-03 RX ORDER — ACETAMINOPHEN 650 MG/1
650 SUPPOSITORY RECTAL EVERY 6 HOURS PRN
Status: CANCELLED | OUTPATIENT
Start: 2023-04-03

## 2023-04-03 RX ORDER — ACETAMINOPHEN 650 MG/1
650 SUPPOSITORY RECTAL EVERY 6 HOURS PRN
Status: DISCONTINUED | OUTPATIENT
Start: 2023-04-03 | End: 2023-04-06 | Stop reason: HOSPADM

## 2023-04-03 RX ORDER — PANTOPRAZOLE SODIUM 40 MG/1
40 TABLET, DELAYED RELEASE ORAL DAILY
Status: CANCELLED | OUTPATIENT
Start: 2023-04-03

## 2023-04-03 RX ORDER — SODIUM CHLORIDE 0.9 % (FLUSH) 0.9 %
5-40 SYRINGE (ML) INJECTION PRN
Status: DISCONTINUED | OUTPATIENT
Start: 2023-04-03 | End: 2023-04-06 | Stop reason: HOSPADM

## 2023-04-03 RX ORDER — TRAZODONE HYDROCHLORIDE 50 MG/1
50 TABLET ORAL NIGHTLY
Status: DISCONTINUED | OUTPATIENT
Start: 2023-04-03 | End: 2023-04-06 | Stop reason: HOSPADM

## 2023-04-03 RX ORDER — SODIUM CHLORIDE 0.9 % (FLUSH) 0.9 %
5-40 SYRINGE (ML) INJECTION EVERY 12 HOURS SCHEDULED
Status: DISCONTINUED | OUTPATIENT
Start: 2023-04-03 | End: 2023-04-06 | Stop reason: HOSPADM

## 2023-04-03 RX ORDER — SENNA PLUS 8.6 MG/1
2 TABLET ORAL DAILY
Status: DISCONTINUED | OUTPATIENT
Start: 2023-04-03 | End: 2023-04-06 | Stop reason: HOSPADM

## 2023-04-03 RX ORDER — FERROUS SULFATE 325(65) MG
325 TABLET ORAL
Status: CANCELLED | OUTPATIENT
Start: 2023-04-03

## 2023-04-03 RX ORDER — ACETAMINOPHEN 325 MG/1
650 TABLET ORAL EVERY 6 HOURS PRN
Status: DISCONTINUED | OUTPATIENT
Start: 2023-04-03 | End: 2023-04-06 | Stop reason: HOSPADM

## 2023-04-03 RX ORDER — 0.9 % SODIUM CHLORIDE 0.9 %
250 INTRAVENOUS SOLUTION INTRAVENOUS ONCE
Status: COMPLETED | OUTPATIENT
Start: 2023-04-04 | End: 2023-04-04

## 2023-04-03 RX ORDER — AMIODARONE HYDROCHLORIDE 200 MG/1
100 TABLET ORAL DAILY
Status: CANCELLED | OUTPATIENT
Start: 2023-04-03

## 2023-04-03 RX ORDER — DEXTROSE MONOHYDRATE 100 MG/ML
INJECTION, SOLUTION INTRAVENOUS CONTINUOUS PRN
Status: DISCONTINUED | OUTPATIENT
Start: 2023-04-03 | End: 2023-04-06 | Stop reason: HOSPADM

## 2023-04-03 RX ORDER — ACETAMINOPHEN 325 MG/1
650 TABLET ORAL EVERY 6 HOURS PRN
Status: CANCELLED | OUTPATIENT
Start: 2023-04-03

## 2023-04-03 RX ORDER — SODIUM CHLORIDE 0.9 % (FLUSH) 0.9 %
5-40 SYRINGE (ML) INJECTION EVERY 12 HOURS SCHEDULED
Status: CANCELLED | OUTPATIENT
Start: 2023-04-03

## 2023-04-03 RX ORDER — SODIUM CHLORIDE 9 MG/ML
INJECTION, SOLUTION INTRAVENOUS PRN
Status: DISCONTINUED | OUTPATIENT
Start: 2023-04-03 | End: 2023-04-06 | Stop reason: HOSPADM

## 2023-04-03 RX ORDER — INSULIN GLARGINE 100 [IU]/ML
42 INJECTION, SOLUTION SUBCUTANEOUS 2 TIMES DAILY
Status: CANCELLED | OUTPATIENT
Start: 2023-04-03

## 2023-04-03 RX ADMIN — GABAPENTIN 200 MG: 100 CAPSULE ORAL at 21:58

## 2023-04-03 RX ADMIN — SENNOSIDES 17.2 MG: 8.6 TABLET, FILM COATED ORAL at 10:12

## 2023-04-03 RX ADMIN — IPRATROPIUM BROMIDE 0.5 MG: 0.5 SOLUTION RESPIRATORY (INHALATION) at 09:50

## 2023-04-03 RX ADMIN — METOPROLOL SUCCINATE 100 MG: 100 TABLET, EXTENDED RELEASE ORAL at 10:12

## 2023-04-03 RX ADMIN — GABAPENTIN 200 MG: 100 CAPSULE ORAL at 15:29

## 2023-04-03 RX ADMIN — PANTOPRAZOLE SODIUM 40 MG: 40 TABLET, DELAYED RELEASE ORAL at 10:12

## 2023-04-03 RX ADMIN — BUMETANIDE 1 MG: 0.25 INJECTION INTRAMUSCULAR; INTRAVENOUS at 21:59

## 2023-04-03 RX ADMIN — IPRATROPIUM BROMIDE 0.5 MG: 0.5 SOLUTION RESPIRATORY (INHALATION) at 23:14

## 2023-04-03 RX ADMIN — AMIODARONE HYDROCHLORIDE 100 MG: 200 TABLET ORAL at 10:12

## 2023-04-03 RX ADMIN — EZETIMIBE 10 MG: 10 TABLET ORAL at 21:57

## 2023-04-03 RX ADMIN — BUMETANIDE 1 MG: 0.25 INJECTION INTRAMUSCULAR; INTRAVENOUS at 10:12

## 2023-04-03 RX ADMIN — IPRATROPIUM BROMIDE 0.5 MG: 0.5 SOLUTION RESPIRATORY (INHALATION) at 16:30

## 2023-04-03 RX ADMIN — MICONAZOLE NITRATE: 20 CREAM TOPICAL at 22:06

## 2023-04-03 RX ADMIN — MICONAZOLE NITRATE: 20 POWDER TOPICAL at 19:15

## 2023-04-03 RX ADMIN — INSULIN GLARGINE 42 UNITS: 100 INJECTION, SOLUTION SUBCUTANEOUS at 22:08

## 2023-04-03 RX ADMIN — PETROLATUM: 420 OINTMENT TOPICAL at 22:01

## 2023-04-03 RX ADMIN — PETROLATUM: 420 OINTMENT TOPICAL at 19:15

## 2023-04-03 RX ADMIN — METOPROLOL SUCCINATE 100 MG: 100 TABLET, EXTENDED RELEASE ORAL at 21:58

## 2023-04-03 RX ADMIN — TRAZODONE HYDROCHLORIDE 50 MG: 50 TABLET ORAL at 21:58

## 2023-04-03 RX ADMIN — INSULIN GLARGINE 42 UNITS: 100 INJECTION, SOLUTION SUBCUTANEOUS at 10:14

## 2023-04-03 RX ADMIN — Medication 10 ML: at 10:13

## 2023-04-03 RX ADMIN — EMPAGLIFLOZIN 10 MG: 10 TABLET, FILM COATED ORAL at 10:14

## 2023-04-03 RX ADMIN — INSULIN LISPRO 6 UNITS: 100 INJECTION, SOLUTION INTRAVENOUS; SUBCUTANEOUS at 10:53

## 2023-04-03 RX ADMIN — Medication 10 ML: at 21:59

## 2023-04-03 RX ADMIN — FERROUS SULFATE TAB 325 MG (65 MG ELEMENTAL FE) 325 MG: 325 (65 FE) TAB at 10:12

## 2023-04-03 RX ADMIN — APIXABAN 5 MG: 5 TABLET, FILM COATED ORAL at 21:58

## 2023-04-03 RX ADMIN — DULOXETINE HYDROCHLORIDE 60 MG: 60 CAPSULE, DELAYED RELEASE ORAL at 21:57

## 2023-04-03 RX ADMIN — MICONAZOLE NITRATE: 20 POWDER TOPICAL at 22:00

## 2023-04-03 RX ADMIN — GABAPENTIN 200 MG: 100 CAPSULE ORAL at 10:12

## 2023-04-03 RX ADMIN — APIXABAN 5 MG: 5 TABLET, FILM COATED ORAL at 10:27

## 2023-04-03 ASSESSMENT — PAIN SCALES - GENERAL
PAINLEVEL_OUTOF10: 0

## 2023-04-03 NOTE — H&P
[facility]  History and Physical      CHIEF COMPLAINT:    No chief complaint on file. Patient of Court DO Mikie presents with:  Acute on chronic congestive heart failure, unspecified heart failure type ([facility]    History of Present Illness:   Patient is a 55-year-old female with a past medical history of CHF, anemia, arthritis, asthma, A-fib on [address] [place] [address] Road, CHF, COPD, diabetes, GERD, hyperlipidemia, hypertension, respiratory acidosis, and sinus tachycardia. Patient presented to [facility] ED with complaints of shortness of breath. Patient says that she has a history of COPD and CHF she has been staying at a hotel as her electricity has been put. Daughter was available for information and she states that her mom has not been sleeping well while in the hotel. Daughter states that she woke up from a nap and started shaking and complained of shortness of breath. Patient is on 4 L nasal cannula continuously. Patient is currently on 3 L O2. Patient admits to being compliant with all medications. ER work-up revealed elevated BNP 2622, troponin 12, CXR Suspect early pulmonary vascular congestion. Patient is admitted to telemetry unit for further testing and treatment. Resting comfortably in no apparent acute distress-breathing easy, requiring minimal oxygen at her baseline. No DVT on bilateral lower extremity ultrasound    REVIEW OF SYSTEMS:  Pertinent negatives are above in HPI. 10 point ROS otherwise negative.       Past Medical History:   Diagnosis Date    (HFpEF) heart failure with preserved ejection fraction ([facility]     1/16/2018- echo- LVEF 55-65%    Anal fissure     Anemia 10/6/2017    Anxiety and depression     Arthritis     Asthma     Atrial fibrillation (HCC)     Eliquis    Blood transfusion     long time no reaction    CHF (congestive heart failure) (HCC)     Diastolic    COPD (chronic obstructive pulmonary disease) (HCC)     Disc disorder     DM2 (diabetes mellitus, type 2) ([facility]

## 2023-04-03 NOTE — ACP (ADVANCE CARE PLANNING)
Advance Care Planning   Healthcare Decision Maker:    Primary Decision Maker: Kelli Jean - Child - [phone]    Secondary Decision Maker (Active): Thee Betancur - Child - [phone]    Click here to complete Healthcare Decision Makers including selection of the Healthcare Decision Maker Relationship (ie \"Primary\"). Today we documented Decision Maker(s) consistent with Legal Next of Kin hierarchy.

## 2023-04-03 NOTE — CONSULTS
Angiotensin Receptor Neprilysin Inhibitor ordered:  [] Yes  [x] No (specify contraindication):    [] ACE inhibitor use within the prior 36 hours  [] Allergy  [] Hyperkalemia  [] Hypotension  [] Renal dysfunction defined as creatinine > 2.5 mg/dL in men or > 2.0 mg/dL in women  [] Other Contraindications  [] Not Eligible  [] Not Tolerant  [] Patient Reason  []System Reason  [x]Other Reason      Beta Blocker ordered:    [x] Yes  [] No (specify contraindication):    [] Contraindicated  [] Asthma  [] Fluid Overload  [] Low Blood Pressure  [] Patient recently treated with an intravenous positive inotropic agent  [] Other Contraindications  [] Not Eligible  [] Not Tolerant  [] Patient Reason  [] System Reason    SGLT2 Inhibitor ordered:  [x] Yes  [] No (specify contraindication):    [] Contraindicated  [] Patient currently on dialysis  [] Ketoacidosis  [] Known hypersensitivity to the medication  [] Type I diabetes (not approved for use in patients with Type I diabetes due to increased risk of ketoacidosis)  [] Other Contraindications  [] Not Eligible  [] Not Tolerant  [] Patient Reason  [] System Reason  [] Other Reason    Aldosterone Antagonist ordered:  [] Yes  [x] No (specify contraindication):    [] Contraindicated  [] Allergy due to aldosterone receptor antagonist  [] Hyperkalemia  [] Renal dysfunction defined as creatinine >2.5 mg/dL in men or >2.0 mg/dL in women.   [] Other contraindications  [] Not Eligible  [] Not Tolerant  [] Patient Reason  [] System Reason  [x] Other Reason

## 2023-04-03 NOTE — ED PROVIDER NOTES
Department of Emergency Medicine   ED  Provider Note  Admit Date/RoomTime: 4/2/2023  9:15 PM  ED Room: 15/15          History of Present Illness:  4/2/23, Time: 9:41 PM EDT  Chief Complaint   Patient presents with    Shortness of Breath     Pt with copd and daughter states she was sleeping and had awaken from a nap and stated she had sob and was shaking . Pt was d/c from nursing home 3/23                 Cha Wellington is a 69 y.o. female presenting to the ED for shortness of breath, beginning prior to arrival.  The complaint has been intermittent, moderate in severity, and worsened by nothing. Daughter accompanies the patient. She says that she has a history of COPD and CHF. They have been staying at a hotel as their electricity has been out. She states that her mom has not been sleeping well while in the hotel. Daughter states that she woke up from a nap and started shaking and complained of shortness of breath. She is on 4 L nasal cannula continuously. Patient says that she feels better now. Just slightly short of breath. Patient denies any fever, chills, rhinorrhea, sore throat, cough, chest pain, abdominal pain. Patient was just discharged from the nursing facility on 3/23/2023.      Review of Systems:   A complete review of systems was performed and pertinent positives and negatives are stated within HPI, all other systems reviewed and are negative.        --------------------------------------------- PAST HISTORY ---------------------------------------------  Past Medical History:  has a past medical history of (HFpEF) heart failure with preserved ejection fraction ([address] Anal fissure, Anemia, Anxiety and depression, Arthritis, Asthma, Atrial fibrillation ([address] Blood transfusion, CHF (congestive heart failure) ([address] COPD (chronic obstructive pulmonary disease) ([address] Disc disorder, DM2 (diabetes mellitus, type 2) ([facility] Fall due to stumbling, GERD (gastroesophageal reflux disease), History of blood

## 2023-04-03 NOTE — ED NOTES
Report called to BERNABE Leone   [facility] here at this time to transport patient to [facility], room [facility]  04/03/23 0807

## 2023-04-03 NOTE — DISCHARGE INSTRUCTIONS
doctor whether you need another dose. My Goal for Self-management of Heart Failure Includes 5 steps :    1. Notice a change in symptoms ( weight gain, short of breath, leg swelling, decreased activity level, bloating. ...)    2. Evaluate the change: (use the Heart Failure Zones )     3. Decide to take action: decide what your options are, such as: (call your doctor for an extra visit, take a prescribed medication, such as your water pill if your doctor has given you directions to do so, [address])    4. Come up with a strategy:  (now you call the doctor for advice / appointment. This is where you take action!!! Do not wait, catch the symptom early and treat it before it worsens. 5. Evaluate the response: The next day, check your Heart Failure Zones: are you in the GREEN ZONE (safe zone)? Worsening symptoms of YELLOW ZONE? Or have you moved to the RED ZONE and need to call 911 or go to the Emergency Room for evaluation? Call your doctor's office to update them on your symptoms of heart failure.

## 2023-04-03 NOTE — ED NOTES
Patients son, Dallas Georges called with no answer. Will  attempt to call daughter Jenifer Simpson and Papo Bowden, at a later time.      Cooper Dill RN  04/03/23 [id number]

## 2023-04-04 LAB
ALBUMIN SERPL-MCNC: 2.7 G/DL (ref 3.5–5.2)
ALP SERPL-CCNC: 73 U/L (ref 35–104)
ALT SERPL-CCNC: 10 U/L (ref 0–32)
ANION GAP SERPL CALCULATED.3IONS-SCNC: 4 MMOL/L (ref 7–16)
AST SERPL-CCNC: 15 U/L (ref 0–31)
BASOPHILS # BLD: 0.02 E9/L (ref 0–0.2)
BASOPHILS NFR BLD: 0.3 % (ref 0–2)
BILIRUB SERPL-MCNC: 0.3 MG/DL (ref 0–1.2)
BNP BLD-MCNC: 4708 PG/ML (ref 0–125)
BUN SERPL-MCNC: 19 MG/DL (ref 6–23)
CALCIUM SERPL-MCNC: 7.4 MG/DL (ref 8.6–10.2)
CHLORIDE SERPL-SCNC: 102 MMOL/L (ref 98–107)
CO2 SERPL-SCNC: 39 MMOL/L (ref 22–29)
CREAT SERPL-MCNC: 0.7 MG/DL (ref 0.5–1)
EOSINOPHIL # BLD: 0.11 E9/L (ref 0.05–0.5)
EOSINOPHIL NFR BLD: 1.8 % (ref 0–6)
ERYTHROCYTE [DISTWIDTH] IN BLOOD BY AUTOMATED COUNT: 15.3 FL (ref 11.5–15)
GLUCOSE SERPL-MCNC: 80 MG/DL (ref 74–99)
HCT VFR BLD AUTO: 34 % (ref 34–48)
HGB BLD-MCNC: 10.1 G/DL (ref 11.5–15.5)
IMM GRANULOCYTES # BLD: 0.03 E9/L
IMM GRANULOCYTES NFR BLD: 0.5 % (ref 0–5)
LV EF: 68 %
LVEF MODALITY: NORMAL
LYMPHOCYTES # BLD: 1.67 E9/L (ref 1.5–4)
LYMPHOCYTES NFR BLD: 26.6 % (ref 20–42)
MAGNESIUM SERPL-MCNC: 1.4 MG/DL (ref 1.6–2.6)
MCH RBC QN AUTO: 24.4 PG (ref 26–35)
MCHC RBC AUTO-ENTMCNC: 29.7 % (ref 32–34.5)
MCV RBC AUTO: 82.1 FL (ref 80–99.9)
METER GLUCOSE: 120 MG/DL (ref 74–99)
METER GLUCOSE: 122 MG/DL (ref 74–99)
METER GLUCOSE: 157 MG/DL (ref 74–99)
METER GLUCOSE: 55 MG/DL (ref 74–99)
MONOCYTES # BLD: 0.59 E9/L (ref 0.1–0.95)
MONOCYTES NFR BLD: 9.4 % (ref 2–12)
NEUTROPHILS # BLD: 3.86 E9/L (ref 1.8–7.3)
NEUTS SEG NFR BLD: 61.4 % (ref 43–80)
PLATELET # BLD AUTO: 170 E9/L (ref 130–450)
PMV BLD AUTO: 9.6 FL (ref 7–12)
POTASSIUM SERPL-SCNC: 3.2 MMOL/L (ref 3.5–5)
PROT SERPL-MCNC: 4.8 G/DL (ref 6.4–8.3)
RBC # BLD AUTO: 4.14 E12/L (ref 3.5–5.5)
SODIUM SERPL-SCNC: 145 MMOL/L (ref 132–146)
WBC # BLD: 6.3 E9/L (ref 4.5–11.5)

## 2023-04-04 PROCEDURE — 93306 TTE W/DOPPLER COMPLETE: CPT

## 2023-04-04 PROCEDURE — 2580000003 HC RX 258

## 2023-04-04 PROCEDURE — 97165 OT EVAL LOW COMPLEX 30 MIN: CPT

## 2023-04-04 PROCEDURE — 83880 ASSAY OF NATRIURETIC PEPTIDE: CPT

## 2023-04-04 PROCEDURE — 83735 ASSAY OF MAGNESIUM: CPT

## 2023-04-04 PROCEDURE — 2500000003 HC RX 250 WO HCPCS

## 2023-04-04 PROCEDURE — 6360000004 HC RX CONTRAST MEDICATION: Performed by: NURSE PRACTITIONER

## 2023-04-04 PROCEDURE — 2060000000 HC ICU INTERMEDIATE R&B

## 2023-04-04 PROCEDURE — 2700000000 HC OXYGEN THERAPY PER DAY

## 2023-04-04 PROCEDURE — 80053 COMPREHEN METABOLIC PANEL: CPT

## 2023-04-04 PROCEDURE — 36415 COLL VENOUS BLD VENIPUNCTURE: CPT

## 2023-04-04 PROCEDURE — 6360000002 HC RX W HCPCS: Performed by: NURSE PRACTITIONER

## 2023-04-04 PROCEDURE — 94640 AIRWAY INHALATION TREATMENT: CPT

## 2023-04-04 PROCEDURE — 97161 PT EVAL LOW COMPLEX 20 MIN: CPT

## 2023-04-04 PROCEDURE — 2580000003 HC RX 258: Performed by: NURSE PRACTITIONER

## 2023-04-04 PROCEDURE — 6370000000 HC RX 637 (ALT 250 FOR IP): Performed by: NURSE PRACTITIONER

## 2023-04-04 PROCEDURE — 2500000003 HC RX 250 WO HCPCS: Performed by: NURSE PRACTITIONER

## 2023-04-04 PROCEDURE — 82962 GLUCOSE BLOOD TEST: CPT

## 2023-04-04 PROCEDURE — 85025 COMPLETE CBC W/AUTO DIFF WBC: CPT

## 2023-04-04 RX ORDER — LORAZEPAM 0.5 MG/1
0.5 TABLET ORAL 2 TIMES DAILY PRN
Status: DISCONTINUED | OUTPATIENT
Start: 2023-04-04 | End: 2023-04-06 | Stop reason: HOSPADM

## 2023-04-04 RX ADMIN — GABAPENTIN 200 MG: 100 CAPSULE ORAL at 08:51

## 2023-04-04 RX ADMIN — METOPROLOL SUCCINATE 100 MG: 100 TABLET, EXTENDED RELEASE ORAL at 21:37

## 2023-04-04 RX ADMIN — ACETAMINOPHEN 650 MG: 325 TABLET ORAL at 21:37

## 2023-04-04 RX ADMIN — AMIODARONE HYDROCHLORIDE 100 MG: 200 TABLET ORAL at 08:52

## 2023-04-04 RX ADMIN — PANTOPRAZOLE SODIUM 40 MG: 40 TABLET, DELAYED RELEASE ORAL at 08:52

## 2023-04-04 RX ADMIN — INSULIN GLARGINE 42 UNITS: 100 INJECTION, SOLUTION SUBCUTANEOUS at 10:04

## 2023-04-04 RX ADMIN — PERFLUTREN 1.5 ML: 6.52 INJECTION, SUSPENSION INTRAVENOUS at 10:08

## 2023-04-04 RX ADMIN — BUMETANIDE 1 MG: 0.25 INJECTION INTRAMUSCULAR; INTRAVENOUS at 08:52

## 2023-04-04 RX ADMIN — MICONAZOLE NITRATE: 20 POWDER TOPICAL at 21:41

## 2023-04-04 RX ADMIN — IPRATROPIUM BROMIDE 0.5 MG: 0.5 SOLUTION RESPIRATORY (INHALATION) at 09:25

## 2023-04-04 RX ADMIN — Medication 15 G: at 06:24

## 2023-04-04 RX ADMIN — TRAZODONE HYDROCHLORIDE 50 MG: 50 TABLET ORAL at 21:36

## 2023-04-04 RX ADMIN — GABAPENTIN 200 MG: 100 CAPSULE ORAL at 14:41

## 2023-04-04 RX ADMIN — MICONAZOLE NITRATE: 20 POWDER TOPICAL at 08:52

## 2023-04-04 RX ADMIN — METOPROLOL TARTRATE 5 MG: 5 INJECTION, SOLUTION INTRAVENOUS at 00:02

## 2023-04-04 RX ADMIN — Medication 10 ML: at 21:38

## 2023-04-04 RX ADMIN — Medication 10 ML: at 08:53

## 2023-04-04 RX ADMIN — METOPROLOL SUCCINATE 100 MG: 100 TABLET, EXTENDED RELEASE ORAL at 08:52

## 2023-04-04 RX ADMIN — PETROLATUM: 420 OINTMENT TOPICAL at 21:41

## 2023-04-04 RX ADMIN — FERROUS SULFATE TAB 325 MG (65 MG ELEMENTAL FE) 325 MG: 325 (65 FE) TAB at 08:52

## 2023-04-04 RX ADMIN — DULOXETINE HYDROCHLORIDE 60 MG: 60 CAPSULE, DELAYED RELEASE ORAL at 22:05

## 2023-04-04 RX ADMIN — GABAPENTIN 200 MG: 100 CAPSULE ORAL at 21:36

## 2023-04-04 RX ADMIN — APIXABAN 5 MG: 5 TABLET, FILM COATED ORAL at 08:52

## 2023-04-04 RX ADMIN — MICONAZOLE NITRATE: 20 CREAM TOPICAL at 21:42

## 2023-04-04 RX ADMIN — IPRATROPIUM BROMIDE 0.5 MG: 0.5 SOLUTION RESPIRATORY (INHALATION) at 16:30

## 2023-04-04 RX ADMIN — EZETIMIBE 10 MG: 10 TABLET ORAL at 21:36

## 2023-04-04 RX ADMIN — ACETAMINOPHEN 650 MG: 325 TABLET ORAL at 13:31

## 2023-04-04 RX ADMIN — SODIUM CHLORIDE 250 ML: 9 INJECTION, SOLUTION INTRAVENOUS at 00:02

## 2023-04-04 RX ADMIN — MICONAZOLE NITRATE: 20 CREAM TOPICAL at 08:53

## 2023-04-04 RX ADMIN — APIXABAN 5 MG: 5 TABLET, FILM COATED ORAL at 21:36

## 2023-04-04 RX ADMIN — EMPAGLIFLOZIN 10 MG: 10 TABLET, FILM COATED ORAL at 08:52

## 2023-04-04 RX ADMIN — BUMETANIDE 1 MG: 0.25 INJECTION INTRAMUSCULAR; INTRAVENOUS at 21:38

## 2023-04-04 RX ADMIN — PETROLATUM: 420 OINTMENT TOPICAL at 08:52

## 2023-04-04 ASSESSMENT — PAIN DESCRIPTION - FREQUENCY: FREQUENCY: INTERMITTENT

## 2023-04-04 ASSESSMENT — PAIN SCALES - GENERAL
PAINLEVEL_OUTOF10: 0
PAINLEVEL_OUTOF10: 6
PAINLEVEL_OUTOF10: 3
PAINLEVEL_OUTOF10: 3
PAINLEVEL_OUTOF10: 0
PAINLEVEL_OUTOF10: 0

## 2023-04-04 ASSESSMENT — PAIN DESCRIPTION - LOCATION
LOCATION: GENERALIZED
LOCATION: FOOT

## 2023-04-04 ASSESSMENT — PAIN DESCRIPTION - ORIENTATION: ORIENTATION: RIGHT;LEFT

## 2023-04-04 ASSESSMENT — PAIN DESCRIPTION - ONSET: ONSET: ON-GOING

## 2023-04-04 ASSESSMENT — PAIN DESCRIPTION - DESCRIPTORS
DESCRIPTORS: ACHING;DISCOMFORT
DESCRIPTORS: ACHING

## 2023-04-04 ASSESSMENT — PAIN DESCRIPTION - PAIN TYPE: TYPE: CHRONIC PAIN

## 2023-04-04 ASSESSMENT — PAIN - FUNCTIONAL ASSESSMENT: PAIN_FUNCTIONAL_ASSESSMENT: PREVENTS OR INTERFERES SOME ACTIVE ACTIVITIES AND ADLS

## 2023-04-05 LAB
METER GLUCOSE: 103 MG/DL (ref 74–99)
METER GLUCOSE: 130 MG/DL (ref 74–99)
METER GLUCOSE: 132 MG/DL (ref 74–99)
METER GLUCOSE: 145 MG/DL (ref 74–99)
METER GLUCOSE: 198 MG/DL (ref 74–99)

## 2023-04-05 PROCEDURE — 6370000000 HC RX 637 (ALT 250 FOR IP): Performed by: NURSE PRACTITIONER

## 2023-04-05 PROCEDURE — 2500000003 HC RX 250 WO HCPCS: Performed by: NURSE PRACTITIONER

## 2023-04-05 PROCEDURE — 2580000003 HC RX 258: Performed by: NURSE PRACTITIONER

## 2023-04-05 PROCEDURE — 97530 THERAPEUTIC ACTIVITIES: CPT

## 2023-04-05 PROCEDURE — 6370000000 HC RX 637 (ALT 250 FOR IP): Performed by: FAMILY MEDICINE

## 2023-04-05 PROCEDURE — 6360000002 HC RX W HCPCS: Performed by: NURSE PRACTITIONER

## 2023-04-05 PROCEDURE — 2060000000 HC ICU INTERMEDIATE R&B

## 2023-04-05 PROCEDURE — 94640 AIRWAY INHALATION TREATMENT: CPT

## 2023-04-05 PROCEDURE — 82962 GLUCOSE BLOOD TEST: CPT

## 2023-04-05 PROCEDURE — 6370000000 HC RX 637 (ALT 250 FOR IP): Performed by: INTERNAL MEDICINE

## 2023-04-05 PROCEDURE — 2700000000 HC OXYGEN THERAPY PER DAY

## 2023-04-05 RX ORDER — BUMETANIDE 1 MG/1
1 TABLET ORAL 2 TIMES DAILY
Status: DISCONTINUED | OUTPATIENT
Start: 2023-04-05 | End: 2023-04-06 | Stop reason: HOSPADM

## 2023-04-05 RX ORDER — LABETALOL HYDROCHLORIDE 5 MG/ML
10 INJECTION, SOLUTION INTRAVENOUS EVERY 4 HOURS PRN
Status: DISCONTINUED | OUTPATIENT
Start: 2023-04-05 | End: 2023-04-06 | Stop reason: HOSPADM

## 2023-04-05 RX ORDER — INSULIN LISPRO 100 [IU]/ML
0-4 INJECTION, SOLUTION INTRAVENOUS; SUBCUTANEOUS NIGHTLY
Status: DISCONTINUED | OUTPATIENT
Start: 2023-04-05 | End: 2023-04-06 | Stop reason: HOSPADM

## 2023-04-05 RX ORDER — INSULIN LISPRO 100 [IU]/ML
0-8 INJECTION, SOLUTION INTRAVENOUS; SUBCUTANEOUS
Status: DISCONTINUED | OUTPATIENT
Start: 2023-04-05 | End: 2023-04-06 | Stop reason: HOSPADM

## 2023-04-05 RX ADMIN — APIXABAN 5 MG: 5 TABLET, FILM COATED ORAL at 21:04

## 2023-04-05 RX ADMIN — GABAPENTIN 200 MG: 100 CAPSULE ORAL at 21:04

## 2023-04-05 RX ADMIN — TRAZODONE HYDROCHLORIDE 50 MG: 50 TABLET ORAL at 21:04

## 2023-04-05 RX ADMIN — PETROLATUM: 420 OINTMENT TOPICAL at 21:05

## 2023-04-05 RX ADMIN — IPRATROPIUM BROMIDE 0.5 MG: 0.5 SOLUTION RESPIRATORY (INHALATION) at 08:19

## 2023-04-05 RX ADMIN — DULOXETINE HYDROCHLORIDE 60 MG: 60 CAPSULE, DELAYED RELEASE ORAL at 21:04

## 2023-04-05 RX ADMIN — MICONAZOLE NITRATE: 20 CREAM TOPICAL at 21:05

## 2023-04-05 RX ADMIN — LORAZEPAM 0.5 MG: 0.5 TABLET ORAL at 00:19

## 2023-04-05 RX ADMIN — APIXABAN 5 MG: 5 TABLET, FILM COATED ORAL at 09:00

## 2023-04-05 RX ADMIN — MICONAZOLE NITRATE: 20 POWDER TOPICAL at 21:05

## 2023-04-05 RX ADMIN — PETROLATUM: 420 OINTMENT TOPICAL at 09:01

## 2023-04-05 RX ADMIN — Medication 10 ML: at 09:02

## 2023-04-05 RX ADMIN — LORAZEPAM 0.5 MG: 0.5 TABLET ORAL at 21:04

## 2023-04-05 RX ADMIN — PANTOPRAZOLE SODIUM 40 MG: 40 TABLET, DELAYED RELEASE ORAL at 08:59

## 2023-04-05 RX ADMIN — AMIODARONE HYDROCHLORIDE 100 MG: 200 TABLET ORAL at 08:59

## 2023-04-05 RX ADMIN — BUMETANIDE 1 MG: 1 TABLET ORAL at 17:35

## 2023-04-05 RX ADMIN — IPRATROPIUM BROMIDE 0.5 MG: 0.5 SOLUTION RESPIRATORY (INHALATION) at 00:50

## 2023-04-05 RX ADMIN — EZETIMIBE 10 MG: 10 TABLET ORAL at 21:04

## 2023-04-05 RX ADMIN — SENNOSIDES 17.2 MG: 8.6 TABLET, FILM COATED ORAL at 09:00

## 2023-04-05 RX ADMIN — MICONAZOLE NITRATE: 20 CREAM TOPICAL at 09:03

## 2023-04-05 RX ADMIN — GABAPENTIN 200 MG: 100 CAPSULE ORAL at 12:38

## 2023-04-05 RX ADMIN — MICONAZOLE NITRATE: 20 POWDER TOPICAL at 09:01

## 2023-04-05 RX ADMIN — FERROUS SULFATE TAB 325 MG (65 MG ELEMENTAL FE) 325 MG: 325 (65 FE) TAB at 09:00

## 2023-04-05 RX ADMIN — METOPROLOL SUCCINATE 100 MG: 100 TABLET, EXTENDED RELEASE ORAL at 09:00

## 2023-04-05 RX ADMIN — EMPAGLIFLOZIN 10 MG: 10 TABLET, FILM COATED ORAL at 09:13

## 2023-04-05 RX ADMIN — ACETAMINOPHEN 650 MG: 325 TABLET ORAL at 03:30

## 2023-04-05 RX ADMIN — INSULIN GLARGINE 42 UNITS: 100 INJECTION, SOLUTION SUBCUTANEOUS at 21:07

## 2023-04-05 RX ADMIN — BUMETANIDE 1 MG: 0.25 INJECTION INTRAMUSCULAR; INTRAVENOUS at 09:00

## 2023-04-05 RX ADMIN — IPRATROPIUM BROMIDE 0.5 MG: 0.5 SOLUTION RESPIRATORY (INHALATION) at 15:38

## 2023-04-05 RX ADMIN — Medication 10 ML: at 21:05

## 2023-04-05 RX ADMIN — METOPROLOL SUCCINATE 100 MG: 100 TABLET, EXTENDED RELEASE ORAL at 21:04

## 2023-04-05 ASSESSMENT — PAIN DESCRIPTION - LOCATION: LOCATION: LEG

## 2023-04-05 ASSESSMENT — PAIN SCALES - GENERAL
PAINLEVEL_OUTOF10: 3
PAINLEVEL_OUTOF10: 0

## 2023-04-05 ASSESSMENT — PAIN DESCRIPTION - ORIENTATION: ORIENTATION: RIGHT;LEFT

## 2023-04-05 ASSESSMENT — PAIN DESCRIPTION - DESCRIPTORS: DESCRIPTORS: ACHING

## 2023-04-05 NOTE — DISCHARGE INSTR - COC
with surgery for repair    INSERT MIDLINE CATHETER  3/18/2022         JOINT REPLACEMENT  2003    L knee    PICC LINE INSERTION NURSE  3/20/2022         [place] DEBRIDEMENT MUSCLE & FASCIA 20 SQ CM/< Left 11/30/2018    LEFT LEG EXCISIONAL  DEBRIDEMENT WITH TISSUE BIOPSY performed by Lavell Coy DPM at [address] ENDOSCOPY  1/20/2004    gastritis, bx (no H pylori), Dr. Mariama Sweet, [address] GASTROINTESTINAL ENDOSCOPY N/A 6/1/2018    EGD BIOPSY performed by Demetrice Paulson MD at [place] [address] N/A 11/9/2018    EGD BIOPSY performed by Demetrice Paulson MD at [facility] ENDOSCOPY       Immunization History:   Immunization History   Administered Date(s) Administered    COVID-19, MODERNA BLUE border, Primary or Immunocompromised, (age 12y+), IM, 100 mcg/0.5mL 11/23/2021, 12/20/2021    Influenza 10/26/2012    Influenza Virus Vaccine 10/08/2013, 09/04/2015    Influenza, FLUARIX, FLULAVAL, Artie Lang (age 10 mo+) AND AFLURIA, (age 1 y+), PF, 0.5mL 10/24/2016, 12/14/2017    Influenza, High Dose (Fluzone 65 yrs and older) 11/07/2019    Pneumococcal, PPSV23, PNEUMOVAX 21, (age 2y+), SC/IM, 0.5mL 09/04/2015    Zoster Live (Zostavax) 01/02/2015       Active Problems:  Patient Active Problem List   Diagnosis Code    Uncontrolled diabetes mellitus [id number]    Depression F32. A    Essential hypertension I10    Hyperlipidemia E78.5    Osteoarthritis M19.90    PAF (paroxysmal atrial fibrillation) (HCC) - currently in SR I48.0    Pulmonary hypertension I27.20    Chronic sinusitis J32.9    Chronic pain G89.29    Steatohepatitis K75.81    Franklin's cyst of knee M71.20    Diabetic neuropathy (HCC) E11.40    Iron deficiency E61.1    LVH (left ventricular hypertrophy) I51.7    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    Positive hepatitis C antibody test R76.8    PFO (patent foramen ovale) Q21.12    YEIMI (acute kidney injury) (HCC) N17.9    Chronic diastolic heart failure (HCC) I50.32

## 2023-04-06 VITALS
TEMPERATURE: 98.1 F | DIASTOLIC BLOOD PRESSURE: 55 MMHG | WEIGHT: 254.5 LBS | BODY MASS INDEX: 43.45 KG/M2 | HEIGHT: 64 IN | RESPIRATION RATE: 16 BRPM | HEART RATE: 66 BPM | SYSTOLIC BLOOD PRESSURE: 128 MMHG | OXYGEN SATURATION: 100 %

## 2023-04-06 LAB
ALBUMIN SERPL-MCNC: 3.1 G/DL (ref 3.5–5.2)
ALP SERPL-CCNC: 87 U/L (ref 35–104)
ALT SERPL-CCNC: 9 U/L (ref 0–32)
ANION GAP SERPL CALCULATED.3IONS-SCNC: 7 MMOL/L (ref 7–16)
ANISOCYTOSIS: ABNORMAL
AST SERPL-CCNC: 12 U/L (ref 0–31)
BASOPHILS # BLD: 0.03 E9/L (ref 0–0.2)
BASOPHILS NFR BLD: 0.6 % (ref 0–2)
BILIRUB SERPL-MCNC: 0.5 MG/DL (ref 0–1.2)
BNP BLD-MCNC: 1623 PG/ML (ref 0–125)
BUN SERPL-MCNC: 14 MG/DL (ref 6–23)
CALCIUM SERPL-MCNC: 8.6 MG/DL (ref 8.6–10.2)
CHLORIDE SERPL-SCNC: 89 MMOL/L (ref 98–107)
CO2 SERPL-SCNC: 47 MMOL/L (ref 22–29)
CREAT SERPL-MCNC: 0.8 MG/DL (ref 0.5–1)
EOSINOPHIL # BLD: 0.17 E9/L (ref 0.05–0.5)
EOSINOPHIL NFR BLD: 3.1 % (ref 0–6)
ERYTHROCYTE [DISTWIDTH] IN BLOOD BY AUTOMATED COUNT: 14.8 FL (ref 11.5–15)
GLUCOSE SERPL-MCNC: 126 MG/DL (ref 74–99)
HCT VFR BLD AUTO: 36.1 % (ref 34–48)
HGB BLD-MCNC: 10.4 G/DL (ref 11.5–15.5)
HYPOCHROMIA: ABNORMAL
IMM GRANULOCYTES # BLD: 0.02 E9/L
IMM GRANULOCYTES NFR BLD: 0.4 % (ref 0–5)
LYMPHOCYTES # BLD: 1.63 E9/L (ref 1.5–4)
LYMPHOCYTES NFR BLD: 30.1 % (ref 20–42)
MAGNESIUM SERPL-MCNC: 1.7 MG/DL (ref 1.6–2.6)
MCH RBC QN AUTO: 23.5 PG (ref 26–35)
MCHC RBC AUTO-ENTMCNC: 28.8 % (ref 32–34.5)
MCV RBC AUTO: 81.5 FL (ref 80–99.9)
METER GLUCOSE: 137 MG/DL (ref 74–99)
METER GLUCOSE: 93 MG/DL (ref 74–99)
MONOCYTES # BLD: 0.48 E9/L (ref 0.1–0.95)
MONOCYTES NFR BLD: 8.9 % (ref 2–12)
NEUTROPHILS # BLD: 3.08 E9/L (ref 1.8–7.3)
NEUTS SEG NFR BLD: 56.9 % (ref 43–80)
PHOSPHATE SERPL-MCNC: 2.6 MG/DL (ref 2.5–4.5)
PLATELET # BLD AUTO: 152 E9/L (ref 130–450)
PMV BLD AUTO: 9 FL (ref 7–12)
POLYCHROMASIA: ABNORMAL
POTASSIUM SERPL-SCNC: 3.3 MMOL/L (ref 3.5–5)
PROT SERPL-MCNC: 5.9 G/DL (ref 6.4–8.3)
RBC # BLD AUTO: 4.43 E12/L (ref 3.5–5.5)
SODIUM SERPL-SCNC: 143 MMOL/L (ref 132–146)
WBC # BLD: 5.4 E9/L (ref 4.5–11.5)

## 2023-04-06 PROCEDURE — 94640 AIRWAY INHALATION TREATMENT: CPT

## 2023-04-06 PROCEDURE — 84100 ASSAY OF PHOSPHORUS: CPT

## 2023-04-06 PROCEDURE — 82962 GLUCOSE BLOOD TEST: CPT

## 2023-04-06 PROCEDURE — P9047 ALBUMIN (HUMAN), 25%, 50ML: HCPCS | Performed by: FAMILY MEDICINE

## 2023-04-06 PROCEDURE — 36415 COLL VENOUS BLD VENIPUNCTURE: CPT

## 2023-04-06 PROCEDURE — 80053 COMPREHEN METABOLIC PANEL: CPT

## 2023-04-06 PROCEDURE — 6370000000 HC RX 637 (ALT 250 FOR IP): Performed by: FAMILY MEDICINE

## 2023-04-06 PROCEDURE — 6370000000 HC RX 637 (ALT 250 FOR IP): Performed by: NURSE PRACTITIONER

## 2023-04-06 PROCEDURE — 83880 ASSAY OF NATRIURETIC PEPTIDE: CPT

## 2023-04-06 PROCEDURE — 85025 COMPLETE CBC W/AUTO DIFF WBC: CPT

## 2023-04-06 PROCEDURE — 83735 ASSAY OF MAGNESIUM: CPT

## 2023-04-06 PROCEDURE — 6360000002 HC RX W HCPCS: Performed by: FAMILY MEDICINE

## 2023-04-06 PROCEDURE — 2580000003 HC RX 258: Performed by: NURSE PRACTITIONER

## 2023-04-06 PROCEDURE — 2700000000 HC OXYGEN THERAPY PER DAY

## 2023-04-06 PROCEDURE — 6360000002 HC RX W HCPCS: Performed by: NURSE PRACTITIONER

## 2023-04-06 RX ORDER — POTASSIUM CHLORIDE 20 MEQ/1
20 TABLET, EXTENDED RELEASE ORAL 2 TIMES DAILY WITH MEALS
Qty: 60 TABLET | Refills: 3 | DISCHARGE
Start: 2023-04-06

## 2023-04-06 RX ORDER — POTASSIUM CHLORIDE 20 MEQ/1
20 TABLET, EXTENDED RELEASE ORAL 2 TIMES DAILY WITH MEALS
Status: DISCONTINUED | OUTPATIENT
Start: 2023-04-06 | End: 2023-04-06 | Stop reason: HOSPADM

## 2023-04-06 RX ORDER — ALBUMIN (HUMAN) 12.5 G/50ML
50 SOLUTION INTRAVENOUS ONCE
Status: COMPLETED | OUTPATIENT
Start: 2023-04-06 | End: 2023-04-06

## 2023-04-06 RX ADMIN — GABAPENTIN 200 MG: 100 CAPSULE ORAL at 08:27

## 2023-04-06 RX ADMIN — METOPROLOL SUCCINATE 100 MG: 100 TABLET, EXTENDED RELEASE ORAL at 08:27

## 2023-04-06 RX ADMIN — IPRATROPIUM BROMIDE 0.5 MG: 0.5 SOLUTION RESPIRATORY (INHALATION) at 08:50

## 2023-04-06 RX ADMIN — ALBUMIN (HUMAN) 50 G: 0.25 INJECTION, SOLUTION INTRAVENOUS at 08:21

## 2023-04-06 RX ADMIN — FERROUS SULFATE TAB 325 MG (65 MG ELEMENTAL FE) 325 MG: 325 (65 FE) TAB at 08:27

## 2023-04-06 RX ADMIN — MICONAZOLE NITRATE: 20 POWDER TOPICAL at 08:06

## 2023-04-06 RX ADMIN — BUMETANIDE 1 MG: 1 TABLET ORAL at 08:27

## 2023-04-06 RX ADMIN — PANTOPRAZOLE SODIUM 40 MG: 40 TABLET, DELAYED RELEASE ORAL at 08:28

## 2023-04-06 RX ADMIN — SENNOSIDES 17.2 MG: 8.6 TABLET, FILM COATED ORAL at 08:28

## 2023-04-06 RX ADMIN — Medication 10 ML: at 08:15

## 2023-04-06 RX ADMIN — AMIODARONE HYDROCHLORIDE 100 MG: 200 TABLET ORAL at 08:26

## 2023-04-06 RX ADMIN — EMPAGLIFLOZIN 10 MG: 10 TABLET, FILM COATED ORAL at 08:27

## 2023-04-06 RX ADMIN — MICONAZOLE NITRATE: 20 CREAM TOPICAL at 08:06

## 2023-04-06 RX ADMIN — POTASSIUM CHLORIDE 20 MEQ: 20 TABLET, EXTENDED RELEASE ORAL at 08:28

## 2023-04-06 RX ADMIN — INSULIN GLARGINE 42 UNITS: 100 INJECTION, SOLUTION SUBCUTANEOUS at 08:16

## 2023-04-06 RX ADMIN — IPRATROPIUM BROMIDE 0.5 MG: 0.5 SOLUTION RESPIRATORY (INHALATION) at 00:20

## 2023-04-06 RX ADMIN — APIXABAN 5 MG: 5 TABLET, FILM COATED ORAL at 08:26

## 2023-04-06 RX ADMIN — PETROLATUM: 420 OINTMENT TOPICAL at 08:06

## 2023-04-06 NOTE — DISCHARGE SUMMARY
of DVT in either lower extremity as visualized. Small popliteal cyst on the left. Discharge Exam:    HEENT: NCAT,  PERRLA, No JVD  Heart:  RRR, no murmurs, gallops, or rubs.   Lungs:  CTA bilaterally, no wheeze, rales or rhonchi  Abd: bowel sounds present, nontender, nondistended, no masses  Extrem:  No clubbing, cyanosis, or edema    Disposition: {disposition:18248}     Patient Condition at Discharge: ***    Patient Instructions:      Medication List        ASK your doctor about these medications      acetaminophen 500 MG tablet  Commonly known as: TYLENOL     amiodarone 100 MG tablet  Commonly known as: PACERONE     apixaban 5 MG Tabs tablet  Commonly known as: ELIQUIS     bumetanide 2 MG tablet  Commonly known as: BUMEX  Take 1 tablet by mouth daily     DULoxetine 60 MG extended release capsule  Commonly known as: CYMBALTA     empagliflozin 10 MG tablet  Commonly known as: Jardiance  Take 1 tablet by mouth daily     ezetimibe 10 MG tablet  Commonly known as: ZETIA  Take 1 tablet by mouth nightly     ferrous sulfate 325 (65 Fe) MG tablet  Commonly known as: IRON 325     gabapentin 600 MG tablet  Commonly known as: NEURONTIN     hydrOXYzine HCl 50 MG tablet  Commonly known as: ATARAX     insulin glargine 100 UNIT/ML injection vial  Commonly known as: LANTUS  Inject 30 Units into the skin 2 times daily *SEE OTHER ORDER*     ipratropium 0.02 % nebulizer solution  Commonly known as: ATROVENT     metoprolol succinate 100 MG extended release tablet  Commonly known as: TOPROL XL  Take 1 tablet by mouth 2 times daily     NovoLOG FlexPen 100 UNIT/ML injection pen  Generic drug: insulin aspart     oxyCODONE 10 MG extended release tablet  Commonly known as: OXYCONTIN     pantoprazole 40 MG tablet  Commonly known as: PROTONIX     senna 8.6 MG tablet  Commonly known as: SENOKOT     traZODone 50 MG tablet  Commonly known as: DESYREL            Activity: {discharge activity:74540}  Diet: {diet:23673}    Pt has been advised

## 2023-04-06 NOTE — CARE COORDINATION
Patient confused, yelling out. Call placed to son's Sheryl Baca and Maximojuany Driscoll for background information and to commence discharge planning. Left Vm, await return call. Ariana Mendez.  Maureen, MSN RN  [facility] Case Management  [phone]
Per Shun Knox at [facility], facility has accepted patient and will initiate auth request today. Will await outcome and facilitate transfer on receipt of approval. Daughter updated via phone. Shelby Bonner.  Maureen, MSN RN  [facility] Case Management  [phone]
Received notification from Barbara Palafox at [facility] that facility has received authorization from [facility] and can accept patient for SNF stay today. Non emergency transportation has been arranged with facility [facility],  time 2 PM.  Patient's daughter, facility liaison and bedside nurse notified of scheduled  time. LENO initiated, envelope completed with demos, transport forms and [id number]. Jeffrey Reddy MSN RN  [facility] Case Management  [phone]
functional level:    Current functional level:      PT AM-PAC:   /24  OT AM-PAC:   /24    Family can provide assistance at DC: Would you like Case Management to discuss the discharge plan with any other family members/significant others, and if so, who? Plans to Return to Present Housing:    Other Identified Issues/Barriers to RETURNING to current housing: too impaired  Potential Assistance needed at discharge:              Potential DME:    Patient expects to discharge to:    Plan for transportation at discharge:      Financial    Payor: Rigo Bojorquez / Plan: Agusto Han / Product Type: *No Product type* /     Does insurance require precert for SNF: Yes    Potential assistance Purchasing Medications:    Meds-to-Beds request: Yes      [address] [facility] [address] [address] [address] [place] [address]  Phone: [phone] Fax: [address] [address] [phone] [facility] [phone] [address] [address] [address]  Phone: [phone] Fax: [phone]      Notes:    Factors facilitating achievement of predicted outcomes: Family support    Barriers to discharge: Cognitive deficit and Long standing deficits    Additional Case Management Notes: see above    The Plan for Transition of Care is related to the following treatment goals of Acute on chronic congestive heart failure, unspecified heart failure type ([facility] [address] [S28.4]    IF APPLICABLE: The Patient and/or patient representative Hardy Chaves and her family were provided with a choice of provider and agrees with the discharge plan.  Freedom of choice list with basic dialogue that supports the patient's individualized plan of care/goals and shares the quality data associated with the providers was provided to:     Patient Representative Name:       The Patient and/or Patient Representative Agree

## 2023-04-06 NOTE — PROGRESS NOTES
Attempted to call nurse to nurse report again, there was no answer. This RN left phone number with  and requested a callback from the facility.
Attempted to call nurse to nurse to [facility], there was no answer. Will try again later.
[facility]   Progress note      Subjective: The patient is awake and alert. Laying in bed in no acute distress. Indicates that breathing is somewhat improved today. She has no acute complaints. Objective:    BP (!) 134/100   Pulse 70   Temp 97.6 °F (36.4 °C) (Temporal)   Resp 18   Wt 261 lb 1 oz (118.4 kg)   LMP  (LMP Unknown)   SpO2 100%   BMI 44.81 kg/m²     In: 510 [P.O.:500; I.V.:10]  Out: 2800   In: 510   Out: 2800 [Urine:2800]    General appearance: NAD, conversant  HEENT: AT/NC, MMM  Neck: FROM, supple  Lungs: On O2 via NC. Poor effort. Clear to auscultation  CV: RRR, systolic murmur present  Vasc: Radial pulses 2+  Abdomen: Soft, non-tender; no masses or HSM  Extremities: No peripheral edema or digital cyanosis  Skin: no rash, lesions or ulcers  Psych: Alert and oriented to person, place and time  Neuro: no focal deficit     Recent Labs     04/02/23  2210 04/03/23  0900 04/04/23  0356   WBC 6.2 6.3 6.3   HGB 10.1* 10.4* 10.1*   HCT 34.6 36.0 34.0    184 170       Recent Labs     04/02/23  2210 04/04/23  0356    145   K 3.8 3.2*    102   CO2 36* 39*   BUN 35* 19   CREATININE 1.0 0.7   CALCIUM 9.1 7.4*       Assessment:    Principal Problem:    Acute on chronic congestive heart failure, unspecified heart failure type (HCC)  Resolved Problems:    * No resolved hospital problems.  *      Plan:    64-year-old female with a history of CHF, COPD presents to the ED with complaints of shortness of breath and is admitted to telemetry unit with     Acute on chronic congestive heart failure  -Diurese cautiously so as to avoid renal's insufficiency  Bumex 1 mg IV twice daily  -BUN/Creat: 35/1.0  -[id number]  -Daily weights strict I's and O's  -Low-sodium diet  -Supplement O2 to maintain pulse ox duration greater than 93% wean as tolerated  -Echo-most recent 3/2022-EF 65% with pulmonary hypertension  -Continue to monitor through today, will likely discharge tomorrow on
Dr. Wallace updated patient drowsy, blood sugar 132, okay to hold AM lantus  
IV site and telemetry monitor removed. Patient is to be picked up at 2pm today.
Mallory Cuadra, daughter spoke with Eugenio Huerta @ [facility] [phone] Cell # regarding placement after discharge.
Notified Dr. Isabelle Fernandez about patients CO2 of 52. No new orders.
Occupational Therapy  OCCUPATIONAL THERAPY INITIAL EVALUATION  [address]    Date: 2023     Patient Name: Magdaleno Robles  MRN: [id number]  : 1955  Room: [address] [place] [address]    Evaluating OT: Kiera Colrey - [id number]    Referring Provider: RAUL Cabrera CNP  Specific Provider Orders/Date: \"OT eval and treat\" - 4/3/2023    Diagnosis: Acute on chronic congestive heart failure, unspecified heart failure type ([facility] [address] [I50.9]      Pertinent Medical History: arthritis, a-fib, CHF, COPD, DM, anxiety and depression     Precautions: fall risk, skin integrity, O2 via nasal cannula, bed/chair alarms    Assessment of Current Deficits:    [x] Functional mobility   [x] ADLs  [x] Strength               [x] Cognition   [x] Functional transfers   [x] IADLs         [x] Safety Awareness   [x] Endurance   [] Fine Motor Coordination  [x] Balance      [] Vision/Perception   [x] Sensation    [] Gross Motor Coordination [] ROM          [] Delirium                  [] Motor Control     OT PLAN OF CARE   OT POC is based on physician orders, patient diagnosis, and results of clinical assessment.   Frequency/Duration 2-5 days/week for 2 weeks PRN   Specific OT Treatment Interventions to Include:   * Instruction/training on adapted ADL techniques and AE recommendations to increase functional independence within precautions       * Training on energy conservation strategies, correct breathing pattern and techniques to improve independence/tolerance for self-care routine  * Functional transfer/mobility training/DME recommendations for increased independence, safety, and fall prevention  * Patient/Family education to increase follow through with safety techniques and functional independence  * Recommendation of environmental modifications for increased safety with functional transfers/mobility and ADLs  * Therapeutic exercise to
Occupational Therapy  Patient treatment attempted this AM.  Patient laying in the bed. Pt just pushed call light before therapist entered the room however states she cannot remember what she wanted. She refused therapy at this time due to being so tired. Encouraged movement however she continued to decline participation. Will attempt at a later time.   [place] [id number]
Physical Therapy    Facility/Department: [address] INTERNAL MEDICINE 2    Physical Therapy Treatment Note    Name: Shayy Wynn  : 1955  MRN: [id number]  Date of Service: 2023        Patient Diagnosis(es): There were no encounter diagnoses. Past Medical History:  has a past medical history of (HFpEF) heart failure with preserved ejection fraction ([address] Anal fissure, Anemia, Anxiety and depression, Arthritis, Asthma, Atrial fibrillation ([address] Blood transfusion, CHF (congestive heart failure) ([address] COPD (chronic obstructive pulmonary disease) ([address] Disc disorder, DM2 (diabetes mellitus, type 2) ([facility] [address] Fall due to stumbling, GERD (gastroesophageal reflux disease), History of blood transfusion, Hx of blood clots, Hyperlipidemia, Hypertension, Inappropriate sinus tachycardia, LVH (left ventricular hypertrophy), Lymphadenitis, chronic, Occipital neuralgia, Respiratory acidosis, and Sinus tachycardia. Past Surgical History:  has a past surgical history that includes Tonsillectomy (); Appendectomy ();  section ();  section ();  section (); Anus surgery (2006); Upper gastrointestinal endoscopy (2004); Colonoscopy (2004); Colonoscopy (2006); anoscopy (10/25/2006); anoscopy (2007); anoscopy (2007); Colonoscopy (3/23/2012); Anus surgery (2012); Upper gastrointestinal endoscopy (N/A, 2018); Upper gastrointestinal endoscopy (N/A, 2018); pr debridement muscle & fascia 20 sq cm/< (Left, 2018); Endoscopy, colon, diagnostic; fracture surgery; joint replacement (); Insert Midline Catheter (3/18/2022); and 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     Evaluating Therapist: Marni GLOVER    Room #:  6136/6829-J  Diagnosis:  Acute on chronic congestive heart failure, unspecified heart failure type ([address] [I50.9]  PMHx/PSHx:  COPD, CHF  Precautions:  falls, O2      Social:  Pt lives with daughter in a 1 floor plan (daughter works
[facility]   Progress note      Subjective: The patient is awake and alert. Sitting up in chair no apparent acute distress  Indicates that breathing is somewhat improved today    Objective:    BP (!) 151/80   Pulse 77   Temp 98.1 °F (36.7 °C) (Axillary)   Resp 18   Wt 258 lb (117 kg)   LMP  (LMP Unknown)   SpO2 93%   BMI 44.29 kg/m²     In: 1050 [P.O.:1040; I.V.:10]  Out: 3650   In: 1050   Out: 3650 [Urine:3650]    General appearance: NAD, conversant  HEENT: AT/NC, MMM  Neck: FROM, supple  Lungs: Clear to auscultation  CV: RRR, no MRGs  Vasc: Radial pulses 2+  Abdomen: Soft, non-tender; no masses or HSM  Extremities: No peripheral edema or digital cyanosis  Skin: no rash, lesions or ulcers  Psych: Alert and oriented to person, place and time  Neuro: Alert and interactive     Recent Labs     04/02/23  2210 04/03/23  0900 04/04/23  0356   WBC 6.2 6.3 6.3   HGB 10.1* 10.4* 10.1*   HCT 34.6 36.0 34.0    184 170       Recent Labs     04/02/23  2210 04/04/23  0356    145   K 3.8 3.2*    102   CO2 36* 39*   BUN 35* 19   CREATININE 1.0 0.7   CALCIUM 9.1 7.4*       Assessment:    Principal Problem:    Acute on chronic congestive heart failure, unspecified heart failure type (HCC)  Resolved Problems:    * No resolved hospital problems.  *      Plan:    68-year-old female with a history of CHF, COPD presents to the ED with complaints of shortness of breath and is admitted to telemetry unit with     Acute on chronic congestive heart failure  -Diurese cautiously so as to avoid renal's insufficiency  Bumex 1 mg IV twice daily  -BUN/Creat: 35/1.0  -[id number]  -Daily weights strict I's and O's  -Low-sodium diet  -Supplement O2 to maintain pulse ox duration greater than 93% wean as tolerated  -Echo-most recent 3/2022-EF 65% with pulmonary hypertension  -Continue to monitor through today, will likely discharge tomorrow on p.o. diuretics, no acute needs  -Ambulate to check ambulating
Spoke with Dr. Mark Busby. New orders given.   Criss Avalos RN
Wound / ostomy dept consulted for this Pt admitted with CHF. History includes: COPD, CHF, A-fib, anxiety. Pt has denuded buttocks and fungal dermatitis in her groin and gurmeet area. Nystatin powder order for the fungal rash. Aquaphor is ordered for her buttocks. She is on a low air loss bed with SOS precautions. Buttocks  **Informed Consent**    The patient has given verbal consent to have photos taken of Buttocks and inserted into their chart as part of their permanent medical record for purposes of documentation, treatment management and/or medical review. All Images taken on 4/5/23 of patient name: Heber Apt were transmitted and stored on secured ELERTS [facility] by a registered [employer] Mobile Application Device.
Patient and or family understand(s) diagnosis, prognosis, and plan of care. Prognosis is good for reaching above PT goals    PHYSICAL THERAPY PLAN OF CARE:    PT POC is established based on physician order and patient diagnosis     Referring provider/PT Order: RAUL Cabrera - CNP/ PT eval and treat      Current Treatment Recommendations:     [x] Strengthening to improve independence with functional mobility   [] ROM to improve independence with functional mobility   [x] Balance Training to improve static/dynamic balance and to reduce fall risk  [x] Endurance Training to improve activity tolerance during functional mobility   [x] Transfer Training to improve safety and independence with all functional transfers   [x] Gait Training to improve gait mechanics, endurance and assess need for appropriate assistive device  [] Stair Training in preparation for safe discharge home and/or into the community   [] Positioning to prevent skin breakdown and contractures  [x] Safety and Education Training   [x] Patient/Caregiver Education   [] HEP  [] Other     PT long term treatment goals are located in above grid    Frequency of treatments: 2-5x/week x 5 days. Time in  1115  Time out  1130      Evaluation Time includes thorough review of current medical information, gathering information on past medical history/social history and prior level of function, completion of standardized testing/informal observation of tasks, assessment of data and education on plan of care and goals.       CPT codes:  [x] Low Complexity PT evaluation 02738  [] Moderate Complexity PT evaluation 65453  [] High Complexity PT evaluation 62460  [] PT Re-evaluation 69663  [] Gait training 45688 minutes  [] Manual therapy 63704 minutes  [] Therapeutic activities 50544 minutes  [] Therapeutic exercises 63705 minutes  [] Neuromuscular reeducation 58894 minutes     [facility] PT [id number]

## 2023-04-06 NOTE — PLAN OF CARE
Patient's chart updated to reflect:      . - HF care plan, HF education points and HF discharge instructions.  -Orders: 2 gram sodium diet, daily weights, I/O.  -PCP and cardiology follow up appointments to be scheduled within 7 days of hospital discharge. -CHF education session will be provided to the patient prior to hospital discharge.     Seema Fortune RN BSN  Heart Failure Navigator
Problem: Chronic Conditions and Co-morbidities  Goal: Patient's chronic conditions and co-morbidity symptoms are monitored and maintained or improved  Outcome: Progressing     Problem: Discharge Planning  Goal: Discharge to home or other facility with appropriate resources  Outcome: Progressing     Problem: Safety - Adult  Goal: Free from fall injury  Outcome: Progressing     Problem: Pain  Goal: Verbalizes/displays adequate comfort level or baseline comfort level  Outcome: Progressing
Problem: Chronic Conditions and Co-morbidities  Goal: Patient's chronic conditions and co-morbidity symptoms are monitored and maintained or improved  Outcome: Progressing     Problem: Discharge Planning  Goal: Discharge to home or other facility with appropriate resources  Outcome: Progressing     Problem: Safety - Adult  Goal: Free from fall injury  Outcome: Progressing     Problem: Pain  Goal: Verbalizes/displays adequate comfort level or baseline comfort level  Outcome: Progressing     Problem: Skin/Tissue Integrity  Goal: Absence of new skin breakdown  Description: 1. Monitor for areas of redness and/or skin breakdown  2. Assess vascular access sites hourly  3. Every 4-6 hours minimum:  Change oxygen saturation probe site  4. Every 4-6 hours:  If on nasal continuous positive airway pressure, respiratory therapy assess nares and determine need for appliance change or resting period. Outcome: Progressing     Problem: ABCDS Injury Assessment  Goal: Absence of physical injury  Outcome: Progressing     Problem: Confusion  Goal: Confusion, delirium, dementia, or psychosis is improved or at baseline  Description: INTERVENTIONS:  1. Assess for possible contributors to thought disturbance, including medications, impaired vision or hearing, underlying metabolic abnormalities, dehydration, psychiatric diagnoses, and notify attending LIP  2. [place] high risk fall precautions, as indicated  3. Provide frequent short contacts to provide reality reorientation, refocusing and direction  4. Decrease environmental stimuli, including noise as appropriate  5. Monitor and intervene to maintain adequate nutrition, hydration, elimination, sleep and activity  6. If unable to ensure safety without constant attention obtain sitter and review sitter guidelines with assigned personnel  7.  Initiate Psychosocial CNS and Spiritual Care consult, as indicated  Outcome: Progressing
attention obtain sitter and review sitter guidelines with assigned personnel  7.  Initiate Psychosocial CNS and Spiritual Care consult, as indicated  Outcome: Not Progressing

## 2023-04-17 ASSESSMENT — EJECTION FRACTION
EF_VALUE: 65-70
EF_SOURCE: 2D ECHO

## 2023-04-17 NOTE — PROGRESS NOTES
Hand dominance: R  UE ROM: RUE: Shoulder: 90 degrees flexion, distally grossly WFL     LUE: Shoulder 90 degrees flexion, distally grossly WFL    Strength: RUE: 4/5    LUE: 4/5   Strength: B WFL  Fine Motor Coordination: WFL    Hearing: WFL  Sensation: No c/o numbness or tingling  Tone: WFL  Edema: Moderate B LE edema noted                       Treatment:    ADL completion: Self-care retraining for the above-mentioned ADLs; training on proper hand placement, safety technique, sequencing, and energy conservation techniques.  Postural Balance: Sitting and standing balance retraining to improve righting reactions with postural changes during ADLS. Comments: Upon arrival, patient seated in bedside chair; O2 99%; HR 85. At end of session, patient seated in bedside chair with call light and phone within reach, all lines and tubes intact - nursing notified about pts request for pain medication. Pt demonstrating F understanding of education/techniques. Patient would benefit from continued skilled OT  to improve safety, functional independence and quality of life.     Assessment of current deficits:    Functional mobility [x]  ADLs [x] Strength [x]  Cognition []  Functional transfers  [x] IADLs [x] Safety Awareness [x]  Endurance [x]  Fine Motor Coordination [] Balance [x] Vision/perception [] Sensation []   Gross Motor Coordination [] ROM [x] Delirium []                  Motor Control []    Plan of Care:   ADL retraining [x]   Equipment needs [x]   Neuromuscular re-education [] Energy Conservation Techniques []  Functional Transfer training [x] Patient and/or Family Education [x]  Functional Mobility training [x] Environmental Modifications []  Cognitive re-training []   Compensatory techniques for ADLs [x]  Splinting Needs []   Positioning to improve overall function []   Therapeutic Activity [x]  Therapeutic Exercise  [x]  Visual/Perceptual: []    Delirium prevention/treatment  []   Other: []    Rehab Potential: Good for established goals    Patient / Family Goal: Return to PLOF     Evaluation time includes thorough review of current medical information, gathering information on past medical & social history & PLOF, completion of standardized testing, informal observation of tasks, consultation with other medical professions/disciplines, assessment of data & development of POC/goals.      Evaluation Complexity: Low    Treatment Time In: 1320            Treatment Time Out:  6563             Treatment Charges: Mins Units   Ther Ex  28447     Manual Therapy 37209     Thera Activities 38088     ADL/Home Mgt 00401 15 1   Neuro Re-ed 69744     Group Therapy      Orthotic manage/training  09984     Non-Billable Time     Total Timed Treatment [address], OTR/L  [id number]  Alvin Reyez, S/OT Qbrexza Pregnancy And Lactation Text: There is no available data on Qbrexza use in pregnant women.  There is no available data on Qbrexza use in lactation.

## 2023-04-17 NOTE — DISCHARGE INSTRUCTIONS
ask your healthcare professional. [place] [address] any warranty or liability for your use of this information.

## 2023-04-18 ENCOUNTER — TELEPHONE (OUTPATIENT)
Dept: CARDIOLOGY CLINIC | Age: 68
End: 2023-04-18

## 2023-04-18 ENCOUNTER — OUTSIDE SERVICES (OUTPATIENT)
Dept: FAMILY MEDICINE CLINIC | Age: 68
End: 2023-04-18

## 2023-04-18 DIAGNOSIS — L97.919 ULCERS OF BOTH LOWER LEGS (HCC): ICD-10-CM

## 2023-04-18 DIAGNOSIS — J96.01 ACUTE HYPOXEMIC RESPIRATORY FAILURE (HCC): ICD-10-CM

## 2023-04-18 DIAGNOSIS — I50.31 ACUTE DIASTOLIC CONGESTIVE HEART FAILURE (HCC): ICD-10-CM

## 2023-04-18 DIAGNOSIS — I50.43 CHF (CONGESTIVE HEART FAILURE), NYHA CLASS I, ACUTE ON CHRONIC, COMBINED (HCC): ICD-10-CM

## 2023-04-18 DIAGNOSIS — I48.0 PAROXYSMAL ATRIAL FIBRILLATION (HCC): ICD-10-CM

## 2023-04-18 DIAGNOSIS — E11.42 DIABETIC POLYNEUROPATHY ASSOCIATED WITH TYPE 2 DIABETES MELLITUS (HCC): Chronic | ICD-10-CM

## 2023-04-18 DIAGNOSIS — I10 ESSENTIAL HYPERTENSION: ICD-10-CM

## 2023-04-18 DIAGNOSIS — J96.22 ACUTE AND CHRONIC RESPIRATORY FAILURE WITH HYPERCAPNIA (HCC): Primary | ICD-10-CM

## 2023-04-18 DIAGNOSIS — J96.21 ACUTE ON CHRONIC RESPIRATORY FAILURE WITH HYPOXIA (HCC): ICD-10-CM

## 2023-04-18 DIAGNOSIS — I82.5Z1 CHRONIC DEEP VEIN THROMBOSIS (DVT) OF DISTAL VEIN OF RIGHT LOWER EXTREMITY (HCC): ICD-10-CM

## 2023-04-18 DIAGNOSIS — J44.1 COPD EXACERBATION (HCC): ICD-10-CM

## 2023-04-18 DIAGNOSIS — Q21.12 PFO (PATENT FORAMEN OVALE): ICD-10-CM

## 2023-04-18 DIAGNOSIS — I50.33 DIASTOLIC CHF, ACUTE ON CHRONIC (HCC): ICD-10-CM

## 2023-04-18 DIAGNOSIS — E27.8 ADRENAL MASS (HCC): ICD-10-CM

## 2023-04-18 DIAGNOSIS — R76.8 POSITIVE HEPATITIS C ANTIBODY TEST: ICD-10-CM

## 2023-04-18 DIAGNOSIS — E11.65 POORLY CONTROLLED DIABETES MELLITUS (HCC): ICD-10-CM

## 2023-04-18 DIAGNOSIS — L97.929 ULCERS OF BOTH LOWER LEGS (HCC): ICD-10-CM

## 2023-04-18 DIAGNOSIS — E66.01 MORBID OBESITY WITH BMI OF 45.0-49.9, ADULT (HCC): ICD-10-CM

## 2023-04-18 ASSESSMENT — ENCOUNTER SYMPTOMS
GASTROINTESTINAL NEGATIVE: 1
APNEA: 0
RESPIRATORY NEGATIVE: 1
EYE PAIN: 0
EYE ITCHING: 0
SINUS PAIN: 0
CHOKING: 0
ABDOMINAL DISTENTION: 0
EYE REDNESS: 0
SHORTNESS OF BREATH: 0
VOICE CHANGE: 0
DIARRHEA: 0
COLOR CHANGE: 0
PHOTOPHOBIA: 0
RHINORRHEA: 0
EYE DISCHARGE: 0
SORE THROAT: 0
STRIDOR: 0
SINUS PRESSURE: 0
ABDOMINAL PAIN: 0
NAUSEA: 0
ANAL BLEEDING: 0
BLOOD IN STOOL: 0
CHEST TIGHTNESS: 0
RECTAL PAIN: 0
VOMITING: 0
FACIAL SWELLING: 0
COUGH: 0
TROUBLE SWALLOWING: 0
CONSTIPATION: 0
ALLERGIC/IMMUNOLOGIC NEGATIVE: 1
BACK PAIN: 1
WHEEZING: 0

## 2023-04-18 NOTE — PROGRESS NOTES
Gisela Valenzuela is a 69 y.o. female. HPI/Chief C/O:  Admitted with acute on chronic CHF and respiratory failure   Allergies   Allergen Reactions    Statins Myalgia and Rash   This patient is here to establish care as a new patient in our facility   We will review all past medical history and go over past and current medications   We will review all medical records that are available to us  We will reconcile our care planning and treatment goals to past and present for this patient             ROS:  Review of Systems   Constitutional:  Positive for fatigue. Negative for activity change, appetite change, chills, diaphoresis, fever and unexpected weight change. HENT: Negative. Negative for congestion, dental problem, drooling, ear discharge, ear pain, facial swelling, hearing loss, mouth sores, nosebleeds, postnasal drip, rhinorrhea, sinus pressure, sinus pain, sneezing, sore throat, tinnitus, trouble swallowing and voice change. Eyes:  Negative for photophobia, pain, discharge, redness, itching and visual disturbance. Respiratory: Negative. Negative for apnea, cough, choking, chest tightness, shortness of breath, wheezing and stridor. Cardiovascular: Negative. Negative for chest pain, palpitations and leg swelling. Gastrointestinal: Negative. Negative for abdominal distention, abdominal pain, anal bleeding, blood in stool, constipation, diarrhea, nausea, rectal pain and vomiting. Endocrine: Negative. Negative for cold intolerance, heat intolerance, polydipsia, polyphagia and polyuria. Genitourinary: Negative. Negative for decreased urine volume, difficulty urinating, dysuria, enuresis, flank pain, frequency, genital sores, hematuria and urgency. Musculoskeletal:  Positive for arthralgias, back pain, gait problem and myalgias. Negative for joint swelling, neck pain and neck stiffness. Skin:  Positive for wound (both lower legs). Negative for color change, pallor and rash.

## 2023-04-18 NOTE — TELEPHONE ENCOUNTER
\" Spoke with Radha Coyne at [facility] and confirmed that Ms. Parvez Hubbard has transportation set up to  at   12 PM for appointment with Maryanne Macario NP tomorrow at 1 PM at the [facility]\" CF

## 2023-04-19 ENCOUNTER — HOSPITAL ENCOUNTER (OUTPATIENT)
Dept: OTHER | Age: 68
Setting detail: THERAPIES SERIES
Discharge: HOME | End: 2023-04-19
Payer: MEDICARE

## 2023-04-19 VITALS
HEART RATE: 108 BPM | HEIGHT: 64 IN | OXYGEN SATURATION: 91 % | BODY MASS INDEX: 38.41 KG/M2 | DIASTOLIC BLOOD PRESSURE: 62 MMHG | SYSTOLIC BLOOD PRESSURE: 127 MMHG | RESPIRATION RATE: 18 BRPM | WEIGHT: 225 LBS

## 2023-04-19 DIAGNOSIS — G47.33 OSA (OBSTRUCTIVE SLEEP APNEA): Primary | ICD-10-CM

## 2023-04-19 LAB
ANION GAP SERPL CALCULATED.3IONS-SCNC: 12 MMOL/L (ref 7–16)
BNP BLD-MCNC: 78 PG/ML (ref 0–125)
BUN SERPL-MCNC: 20 MG/DL (ref 6–23)
CALCIUM SERPL-MCNC: 9.5 MG/DL (ref 8.6–10.2)
CHLORIDE SERPL-SCNC: 88 MMOL/L (ref 98–107)
CO2 SERPL-SCNC: 32 MMOL/L (ref 22–29)
CREAT SERPL-MCNC: 1.2 MG/DL (ref 0.5–1)
GLUCOSE SERPL-MCNC: 595 MG/DL (ref 74–99)
POTASSIUM SERPL-SCNC: 4.1 MMOL/L (ref 3.5–5)
SODIUM SERPL-SCNC: 132 MMOL/L (ref 132–146)

## 2023-04-19 PROCEDURE — 83880 ASSAY OF NATRIURETIC PEPTIDE: CPT

## 2023-04-19 PROCEDURE — 36415 COLL VENOUS BLD VENIPUNCTURE: CPT

## 2023-04-19 PROCEDURE — 80048 BASIC METABOLIC PNL TOTAL CA: CPT

## 2023-04-19 RX ORDER — METOPROLOL SUCCINATE 100 MG/1
50 TABLET, EXTENDED RELEASE ORAL 2 TIMES DAILY
Qty: 60 TABLET | Refills: 3 | Status: SHIPPED | OUTPATIENT
Start: 2023-04-19 | End: 2023-04-19 | Stop reason: SDUPTHER

## 2023-04-19 RX ORDER — BISACODYL 10 MG
10 SUPPOSITORY, RECTAL RECTAL DAILY PRN
COMMUNITY

## 2023-04-19 RX ORDER — METOPROLOL SUCCINATE 100 MG/1
50 TABLET, EXTENDED RELEASE ORAL 2 TIMES DAILY
Qty: 60 TABLET | Refills: 3 | Status: SHIPPED | OUTPATIENT
Start: 2023-04-19

## 2023-04-19 RX ORDER — SODIUM PHOSPHATE, DIBASIC AND SODIUM PHOSPHATE, MONOBASIC 7; 19 G/133ML; G/133ML
1 ENEMA RECTAL
COMMUNITY

## 2023-04-19 ASSESSMENT — PATIENT HEALTH QUESTIONNAIRE - PHQ9
1. LITTLE INTEREST OR PLEASURE IN DOING THINGS: NOT AT ALL
SUM OF ALL RESPONSES TO PHQ9 QUESTIONS 1 & 2: 0
2. FEELING DOWN, DEPRESSED OR HOPELESS: NOT AT ALL

## 2023-04-19 ASSESSMENT — PAIN SCALES - GENERAL: PAINLEVEL_OUTOF10: 0

## 2023-04-19 NOTE — PROGRESS NOTES
[facility]   [facility] Clinic Visit         Reason for Visit: Heart Failure     Primary Cardiologist: Dr. Kaden Chaudhary        History of Present Illness:     Ms. Shelby Walton is a 69year old female with a PMHx of HFpEF, LVH, PAF, HTN, PFO, hx of TIA, DM, COPD with chronic o2 use, probable untreated sleep apnea (has failed to get sleep study). She presents today in hospital follow-up, since discharge from the hospital she has been at [facility] and compliant with her current cardiac medications. Upon discharge from the hospital she was on metoprolol succinate 100 mg twice daily, however per [facility] facility medication list they are not giving her any metoprolol. She is tachycardic in clinic today. From a breathing standpoint she is at baseline on 4 L nasal cannula, she sleeps on an incline but denies any orthopnea, PND. She denies any dizziness, lightheadedness, near-syncope, syncope, strokelike symptoms, chest pain, pressure, heaviness, palpitations, abdominal bloating, early satiety. Her lower extremity edema is improved with clear wrinkling of the lower extremities.       Patient Active Problem List    Diagnosis Date Noted    Anemia 12/16/2018     Priority: High    Acute and chronic respiratory failure with hypercapnia ([address] 10/04/2022     Priority: Medium    DM (diabetes mellitus) ([address] 10/04/2022     Priority: Medium    Anxiety and depression 11/06/2018     Priority: Low    Chronic anticoagulation 11/06/2018     Priority: Low    Acute on chronic congestive heart failure ([address] 04/03/2023    Acute on chronic congestive heart failure, unspecified heart failure type ([address] 04/03/2023    Sepsis ([address] 03/08/2022    Bilateral cellulitis of lower leg 07/05/2021    Ambulatory dysfunction 07/05/2021    Chronic respiratory failure with hypercapnia ([address] 07/05/2021    Moderate pulmonary hypertension ([address] 07/05/2021    QT prolongation 07/05/2021    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[address] 07/05/2021    Infection due to extended-spectrum

## 2023-04-20 ENCOUNTER — TELEPHONE (OUTPATIENT)
Dept: CARDIOLOGY CLINIC | Age: 68
End: 2023-04-20

## 2023-04-20 ENCOUNTER — CLINICAL DOCUMENTATION (OUTPATIENT)
Dept: FAMILY MEDICINE CLINIC | Age: 68
End: 2023-04-20

## 2023-04-20 ASSESSMENT — ENCOUNTER SYMPTOMS
BLOOD IN STOOL: 0
APNEA: 0
TROUBLE SWALLOWING: 0
SHORTNESS OF BREATH: 0
CHEST TIGHTNESS: 0
ABDOMINAL DISTENTION: 0
FACIAL SWELLING: 0
GASTROINTESTINAL NEGATIVE: 1
PHOTOPHOBIA: 0
EYE ITCHING: 0
ANAL BLEEDING: 0
EYE PAIN: 0
COLOR CHANGE: 0
RECTAL PAIN: 0
NAUSEA: 0
CONSTIPATION: 0
SINUS PAIN: 0
SORE THROAT: 0
VOICE CHANGE: 0
STRIDOR: 0
WHEEZING: 0
BACK PAIN: 1
COUGH: 0
VOMITING: 0
SINUS PRESSURE: 0
DIARRHEA: 0
ABDOMINAL PAIN: 0
CHOKING: 0
EYE DISCHARGE: 0
RESPIRATORY NEGATIVE: 1
ALLERGIC/IMMUNOLOGIC NEGATIVE: 1
RHINORRHEA: 0
EYE REDNESS: 0

## 2023-04-20 NOTE — TELEPHONE ENCOUNTER
----- Message from RAUL Simms CNP sent at 4/19/2023  2:49 PM EDT -----  Please notify SNF facility of critical BGL level and fax them labs from today    Thank you

## 2023-04-20 NOTE — PROGRESS NOTES
problem and myalgias. Negative for joint swelling, neck pain and neck stiffness. Skin:  Positive for wound (both lower legs). Negative for color change, pallor and rash. Allergic/Immunologic: Negative. Negative for environmental allergies, food allergies and immunocompromised state. Neurological:  Positive for weakness. Negative for dizziness, tremors, seizures, syncope, facial asymmetry, speech difficulty, light-headedness, numbness and headaches. Hematological:  Negative for adenopathy. Bruises/bleeds easily. Psychiatric/Behavioral:  Positive for decreased concentration and dysphoric mood. Negative for agitation, behavioral problems, confusion, hallucinations, self-injury, sleep disturbance and suicidal ideas. The patient is nervous/anxious. The patient is not hyperactive. Past Medical/Surgical Hx;  Reviewed with patient      Diagnosis Date    (HFpEF) heart failure with preserved ejection fraction ([facility]     1/16/2018- echo- LVEF 55-65%    Anal fissure     Anemia 10/6/2017    Anxiety and depression     Arthritis     Asthma     Atrial fibrillation (HCC)     Eliquis    Blood transfusion     long time no reaction    CHF (congestive heart failure) (HCC)     Diastolic    COPD (chronic obstructive pulmonary disease) (HCC)     Disc disorder     DM2 (diabetes mellitus, type 2) ([facility]     on insulin    Fall due to stumbling 10/6/2017    GERD (gastroesophageal reflux disease)     History of blood transfusion     Hx of blood clots     Hyperlipidemia     Hypertension     Inappropriate sinus tachycardia     LVH (left ventricular hypertrophy)     moderate    Lymphadenitis, chronic 4/13/2018    Occipital neuralgia 11/2/2011    Respiratory acidosis 10/7/2017    Sinus tachycardia 2/16/2015     Past Surgical History:   Procedure Laterality Date    ANOSCOPY  10/25/2006    injection of anal sphincter with Botox, Franklyn Ortiz/Timmy, [facility]    ANOSCOPY  4/9/2007    injection of anal sphincter with Botox, Dr. Jason Jurado, [facility]

## 2023-04-25 ENCOUNTER — TELEPHONE (OUTPATIENT)
Dept: CARDIOLOGY CLINIC | Age: 68
End: 2023-04-25

## 2023-04-25 NOTE — TELEPHONE ENCOUNTER
Patient is currently at [facility] [phone]. Andrew Suarez CNP  is on MOHAMUD for 2 weeks she ordered SAM study. Gamaliel Davis called to schedule SAM and nurse advised pt was in w/c and if the study could be done at [facility] instead of [place] [address] sleep center. New order will need sent. Please advise.    Thank you, Dulce Mcgowan

## 2023-05-02 ENCOUNTER — TELEPHONE (OUTPATIENT)
Dept: CARDIOLOGY CLINIC | Age: 68
End: 2023-05-02

## 2023-05-02 ENCOUNTER — HOSPITAL ENCOUNTER (OUTPATIENT)
Dept: OTHER | Age: 68
Setting detail: THERAPIES SERIES
Discharge: HOME | End: 2023-05-02
Payer: MEDICARE

## 2023-05-02 VITALS
SYSTOLIC BLOOD PRESSURE: 119 MMHG | RESPIRATION RATE: 18 BRPM | WEIGHT: 229 LBS | DIASTOLIC BLOOD PRESSURE: 56 MMHG | HEART RATE: 93 BPM | OXYGEN SATURATION: 100 % | BODY MASS INDEX: 39.31 KG/M2

## 2023-05-02 LAB
ANION GAP SERPL CALCULATED.3IONS-SCNC: 11 MMOL/L (ref 7–16)
BNP BLD-MCNC: 59 PG/ML (ref 0–125)
BUN SERPL-MCNC: 24 MG/DL (ref 6–23)
CALCIUM SERPL-MCNC: 9.6 MG/DL (ref 8.6–10.2)
CHLORIDE SERPL-SCNC: 94 MMOL/L (ref 98–107)
CO2 SERPL-SCNC: 33 MMOL/L (ref 22–29)
CREAT SERPL-MCNC: 0.9 MG/DL (ref 0.5–1)
GLUCOSE SERPL-MCNC: 147 MG/DL (ref 74–99)
POTASSIUM SERPL-SCNC: 3.8 MMOL/L (ref 3.5–5)
SODIUM SERPL-SCNC: 138 MMOL/L (ref 132–146)

## 2023-05-02 PROCEDURE — 80048 BASIC METABOLIC PNL TOTAL CA: CPT

## 2023-05-02 PROCEDURE — 83880 ASSAY OF NATRIURETIC PEPTIDE: CPT

## 2023-05-02 PROCEDURE — 99214 OFFICE O/P EST MOD 30 MIN: CPT

## 2023-05-02 NOTE — PLAN OF CARE
Problem: Chronic Conditions and Co-morbidities  Goal: Patient's chronic conditions and co-morbidity symptoms are monitored and maintained or improved  Flowsheets (Taken 5/2/2023 4350)  Care Plan - Patient's Chronic Conditions and Co-Morbidity Symptoms are Monitored and Maintained or Improved: Monitor and assess patient's chronic conditions and comorbid symptoms for stability, deterioration, or improvement

## 2023-05-02 NOTE — PROGRESS NOTES
Congestive Heart Failure [address] [facility]   1955          Referring Provider: [facility]  Primary Care Physician: Dr Dianna Love  Cardiologist: Dr Lillian Duval  Nephrologist: N/A        History of Present Illness:     Nayana Johnson is a 69 y.o. female with a history of HFpEF, most recent EF 65% on 3-18-22. Patient Story:    She does  have SLIGHT  dyspnea with exertion, shortness of breath, or decline in overall functional capacity. She does not have orthopnea, PND, nocturnal cough or hemoptysis. She does not have abdominal distention or bloating, early satiety, anorexia/change in appetite. She does has a good urinary response to  oral diuretic. She does have  lower extremity edema. She denies lightheadedness, dizziness. She denies palpitations, syncope or near syncope. She does not complain of chest pain, pressure, discomfort.          Allergies   Allergen Reactions    Statins Myalgia and Rash           Current Outpatient Medications on File Prior to Encounter   Medication Sig Dispense Refill    insulin lispro (HUMALOG) 100 UNIT/ML injection vial Inject into the skin as needed for High Blood Sugar Sliding scale      bisacodyl (DULCOLAX) 10 MG suppository Place 1 suppository rectally daily as needed for Constipation      diclofenac sodium (VOLTAREN) 1 % GEL Apply topically 2 times daily      sodium phosphate (FLEET) 7-19 GM/118ML Place 1 enema rectally once as needed      magnesium hydroxide (MILK OF MAGNESIA) 400 MG/5ML suspension Take by mouth daily as needed for Constipation      metoprolol succinate (TOPROL XL) 100 MG extended release tablet Take 0.5 tablets by mouth 2 times daily (Patient not taking: Reported on 5/2/2023) 60 tablet 3    potassium chloride (KLOR-CON M) 20 MEQ extended release tablet Take 1 tablet by mouth 2 times daily (with meals) 60 tablet 3    ferrous sulfate (IRON 325) 325 (65 Fe) MG tablet Take 1 tablet by mouth daily (with

## 2023-05-02 NOTE — PROGRESS NOTES
Spoke to Fisher RN at [facility] regarding Metoprolol not being on medication sheet. Fisher admits medication was never restarted when pt came to facility in April. Fisher confirmed he spoke with Dr. Irvin Quinones and Metoprolol 100 mg daily was restarted today.

## 2023-05-08 ENCOUNTER — TELEPHONE (OUTPATIENT)
Dept: CARDIOLOGY CLINIC | Age: 68
End: 2023-05-08

## 2023-05-08 NOTE — TELEPHONE ENCOUNTER
----- Message from RAUL Pathak CNP sent at 5/8/2023  2:54 PM EDT -----  Regarding: RE: Home sleep study  I highly encourage patient to go in for sleep study however if she is declining then proceed with home sleep study    ----- Message -----  From: Severino Moreno RN  Sent: 5/2/2023   1:18 PM EDT  To: RAUL Howe CNP, #  Subject: RE: Home sleep study                             Again, this was noted / addressed 4/25/23. ANIA Gallo CNP is MOHAMUD for 2 weeks. · Dr Jillian Washington does not write for sleep studies therefore will not advise. Amelia Singletary will address when she returns from MOHAMUD. The order is for a sleep study which is more inclusive of all sleep disorders not just sleep apnea. Home sleep study is limited. The provider will need to determine when she returns next week if ok for home sleep study vs sleep study at facility. Deetta Koyanagi already has this request for Amelia Singletary from 4/25/23 and will be addressed next week. Thank you    ----- Message -----  From: Enrico Velazquez RN  Sent: 5/2/2023  10:48 AM EDT  To: RAUL Howe CNP, #  Subject: Home sleep study                                 Hello ladies,     Sleep lab called and patient is requesting a home sleep study. Sleep lab needs an order. Thank you!     Altagracia Lucio

## 2023-05-08 NOTE — TELEPHONE ENCOUNTER
Left message with  after 2 times of being transferred to  nurses station, no one answered then supervisor vm full to let them know provider's recommendation waiting for call back.  CF

## 2023-05-09 ENCOUNTER — TELEPHONE (OUTPATIENT)
Dept: CARDIOLOGY CLINIC | Age: 68
End: 2023-05-09

## 2023-05-09 DIAGNOSIS — I50.32 CHRONIC DIASTOLIC HEART FAILURE (HCC): Primary | Chronic | ICD-10-CM

## 2023-05-09 DIAGNOSIS — I48.0 PAF (PAROXYSMAL ATRIAL FIBRILLATION) (HCC): Chronic | ICD-10-CM

## 2023-05-09 DIAGNOSIS — G47.30 SLEEP APNEA, UNSPECIFIED TYPE: ICD-10-CM

## 2023-05-09 DIAGNOSIS — J96.11 CHRONIC RESPIRATORY FAILURE WITH HYPOXIA (HCC): ICD-10-CM

## 2023-05-09 NOTE — TELEPHONE ENCOUNTER
Spoke with Dalton Yates RN regarding provider recommendation. Dalton Yates confirms she will give pt nurse the message. Waiting for call back on which sleep study pt prefers.  CF

## 2023-05-09 NOTE — TELEPHONE ENCOUNTER
Per Fausto Humphries CNP changed to home sleep study due to patient refusing sleep study. Esperanza Agustin has been faxed new order today.  CF

## 2023-05-16 ENCOUNTER — OUTSIDE SERVICES (OUTPATIENT)
Dept: FAMILY MEDICINE CLINIC | Age: 68
End: 2023-05-16

## 2023-05-16 DIAGNOSIS — J44.1 COPD EXACERBATION (HCC): ICD-10-CM

## 2023-05-16 DIAGNOSIS — I27.20 PULMONARY HYPERTENSION (HCC): Chronic | ICD-10-CM

## 2023-05-16 DIAGNOSIS — J96.21 ACUTE ON CHRONIC RESPIRATORY FAILURE WITH HYPOXIA (HCC): ICD-10-CM

## 2023-05-16 DIAGNOSIS — I50.31 ACUTE DIASTOLIC CONGESTIVE HEART FAILURE (HCC): ICD-10-CM

## 2023-05-16 DIAGNOSIS — I50.33 DIASTOLIC CHF, ACUTE ON CHRONIC (HCC): ICD-10-CM

## 2023-05-16 DIAGNOSIS — I82.5Z1 CHRONIC DEEP VEIN THROMBOSIS (DVT) OF DISTAL VEIN OF RIGHT LOWER EXTREMITY (HCC): ICD-10-CM

## 2023-05-16 DIAGNOSIS — I48.0 PAF (PAROXYSMAL ATRIAL FIBRILLATION) (HCC): Chronic | ICD-10-CM

## 2023-05-16 DIAGNOSIS — E11.65 POORLY CONTROLLED DIABETES MELLITUS (HCC): ICD-10-CM

## 2023-05-16 DIAGNOSIS — J96.22 ACUTE AND CHRONIC RESPIRATORY FAILURE WITH HYPERCAPNIA (HCC): Primary | ICD-10-CM

## 2023-05-16 DIAGNOSIS — E66.01 MORBID OBESITY WITH BMI OF 45.0-49.9, ADULT (HCC): ICD-10-CM

## 2023-05-16 ASSESSMENT — ENCOUNTER SYMPTOMS
RESPIRATORY NEGATIVE: 1
DIARRHEA: 0
SORE THROAT: 0
FACIAL SWELLING: 0
SHORTNESS OF BREATH: 0
STRIDOR: 0
EYE REDNESS: 0
RECTAL PAIN: 0
ABDOMINAL DISTENTION: 0
WHEEZING: 0
SINUS PAIN: 0
BACK PAIN: 1
SINUS PRESSURE: 0
VOMITING: 0
TROUBLE SWALLOWING: 0
VOICE CHANGE: 0
PHOTOPHOBIA: 0
EYE ITCHING: 0
APNEA: 0
EYE PAIN: 0
COLOR CHANGE: 0
CHEST TIGHTNESS: 0
ANAL BLEEDING: 0
EYE DISCHARGE: 0
CONSTIPATION: 0
NAUSEA: 0
RHINORRHEA: 0
BLOOD IN STOOL: 0
GASTROINTESTINAL NEGATIVE: 1
ALLERGIC/IMMUNOLOGIC NEGATIVE: 1
CHOKING: 0
COUGH: 0
ABDOMINAL PAIN: 0

## 2023-05-16 NOTE — PROGRESS NOTES
Domonique Garcia is a 69 y.o. female. Seen: 5/11/2023   HPI/Chief C/O:  Admitted with acute on chronic CHF and respiratory failure   Allergies   Allergen Reactions    Statins Myalgia and Rash       Patient complaining of bilateral lower extremity burning pain. Per nurse, patient had POCT blood glucose > 500. Patient denies blurry vision, abdominal pain, nausea, vomiting. ROS:  Review of Systems   Constitutional:  Positive for fatigue. Negative for activity change, appetite change, chills, diaphoresis, fever and unexpected weight change. HENT: Negative. Negative for congestion, dental problem, drooling, ear discharge, ear pain, facial swelling, hearing loss, mouth sores, nosebleeds, postnasal drip, rhinorrhea, sinus pressure, sinus pain, sneezing, sore throat, tinnitus, trouble swallowing and voice change. Eyes:  Negative for photophobia, pain, discharge, redness, itching and visual disturbance. Respiratory: Negative. Negative for apnea, cough, choking, chest tightness, shortness of breath, wheezing and stridor. Cardiovascular: Negative. Negative for chest pain, palpitations and leg swelling. Gastrointestinal: Negative. Negative for abdominal distention, abdominal pain, anal bleeding, blood in stool, constipation, diarrhea, nausea, rectal pain and vomiting. Endocrine: Negative. Negative for cold intolerance, heat intolerance, polydipsia, polyphagia and polyuria. Genitourinary: Negative. Negative for decreased urine volume, difficulty urinating, dysuria, enuresis, flank pain, frequency, genital sores, hematuria and urgency. Musculoskeletal:  Positive for arthralgias, back pain, gait problem and myalgias. Negative for joint swelling, neck pain and neck stiffness. Skin:  Positive for wound (both lower legs). Negative for color change, pallor and rash. Allergic/Immunologic: Negative. Negative for environmental allergies, food allergies and immunocompromised state.

## 2023-05-22 ENCOUNTER — CLINICAL DOCUMENTATION (OUTPATIENT)
Dept: FAMILY MEDICINE CLINIC | Age: 68
End: 2023-05-22

## 2023-05-22 ASSESSMENT — ENCOUNTER SYMPTOMS
CHEST TIGHTNESS: 0
RHINORRHEA: 0
NAUSEA: 0
APNEA: 0
EYE PAIN: 0
TROUBLE SWALLOWING: 0
PHOTOPHOBIA: 0
FACIAL SWELLING: 0
RECTAL PAIN: 0
ABDOMINAL DISTENTION: 0
DIARRHEA: 0
VOICE CHANGE: 0
SHORTNESS OF BREATH: 0
WHEEZING: 0
GASTROINTESTINAL NEGATIVE: 1
SORE THROAT: 0
EYE ITCHING: 0
ANAL BLEEDING: 0
BACK PAIN: 1
SINUS PRESSURE: 0
COUGH: 0
VOMITING: 0
BLOOD IN STOOL: 0
SINUS PAIN: 0
ABDOMINAL PAIN: 0
EYE REDNESS: 0
EYE DISCHARGE: 0
STRIDOR: 0
CHOKING: 0
RESPIRATORY NEGATIVE: 1
COLOR CHANGE: 0
CONSTIPATION: 0
ALLERGIC/IMMUNOLOGIC NEGATIVE: 1

## 2023-05-22 NOTE — PROGRESS NOTES
MG tablet Take 1 tablet by mouth nightly 30 tablet 3    acetaminophen (TYLENOL) 500 MG tablet Take 2 tablets by mouth every 6 hours as needed for Pain Indications: -NTE: 3G/24HRS-      apixaban (ELIQUIS) 5 MG TABS tablet Take 1 tablet by mouth 2 times daily      gabapentin (NEURONTIN) 100 MG capsule Take 2 capsules by mouth 3 times daily. No facility-administered encounter medications on file as of 5/22/2023.        Reviewed recent labs related to Dara's current problems      Discussed importance of regular Health Maintenance follow up  Health Maintenance   Topic    Depression Monitoring     DTaP/Tdap/Td vaccine (1 - Tdap)    DEXA (modify frequency per FRAX score)     Shingles vaccine (2 of 3)    Colorectal Cancer Screen     Diabetic retinal exam     Breast cancer screen     Diabetic foot exam     Diabetic Alb to Cr ratio (uACR) test     COVID-19 Vaccine (3 - Booster for Moderna series)    Flu vaccine (Season Ended)    A1C test (Diabetic or Prediabetic)     Lipids     GFR test (Diabetes, CKD 3-4, OR last GFR 15-59)     Pneumococcal 65+ years Vaccine     Hepatitis C screen     Hepatitis A vaccine     Hib vaccine     Meningococcal (ACWY) vaccine      Narciso Leblanc MD   FM resident PGY-2

## 2023-06-09 ENCOUNTER — OUTSIDE SERVICES (OUTPATIENT)
Dept: FAMILY MEDICINE CLINIC | Age: 68
End: 2023-06-09

## 2023-06-09 DIAGNOSIS — I89.0 LYMPHEDEMA OF BOTH LOWER EXTREMITIES: Chronic | ICD-10-CM

## 2023-06-09 DIAGNOSIS — E11.42 DIABETIC POLYNEUROPATHY ASSOCIATED WITH TYPE 2 DIABETES MELLITUS (HCC): Primary | Chronic | ICD-10-CM

## 2023-06-09 DIAGNOSIS — E27.8 ADRENAL MASS (HCC): ICD-10-CM

## 2023-06-09 DIAGNOSIS — I50.32 CHRONIC DIASTOLIC HEART FAILURE (HCC): Chronic | ICD-10-CM

## 2023-06-09 DIAGNOSIS — J96.12 CHRONIC RESPIRATORY FAILURE WITH HYPERCAPNIA (HCC): ICD-10-CM

## 2023-06-09 DIAGNOSIS — I48.0 PAROXYSMAL ATRIAL FIBRILLATION (HCC): ICD-10-CM

## 2023-06-09 DIAGNOSIS — I27.20 MODERATE PULMONARY HYPERTENSION (HCC): ICD-10-CM

## 2023-06-09 ASSESSMENT — ENCOUNTER SYMPTOMS
TROUBLE SWALLOWING: 0
EYE DISCHARGE: 0
CHEST TIGHTNESS: 0
RECTAL PAIN: 0
ABDOMINAL DISTENTION: 0
STRIDOR: 0
CONSTIPATION: 0
VOMITING: 0
RHINORRHEA: 0
BLOOD IN STOOL: 0
BACK PAIN: 1
WHEEZING: 0
DIARRHEA: 0
RESPIRATORY NEGATIVE: 1
SINUS PAIN: 0
EYE ITCHING: 0
ABDOMINAL PAIN: 0
ALLERGIC/IMMUNOLOGIC NEGATIVE: 1
VOICE CHANGE: 0
SORE THROAT: 0
COLOR CHANGE: 0
CHOKING: 0
FACIAL SWELLING: 0
SINUS PRESSURE: 0
NAUSEA: 0
EYE REDNESS: 0
GASTROINTESTINAL NEGATIVE: 1
COUGH: 0
PHOTOPHOBIA: 0
APNEA: 0
EYE PAIN: 0
ANAL BLEEDING: 0
SHORTNESS OF BREATH: 0

## 2023-06-09 NOTE — PROGRESS NOTES
Alcohol use: Not Currently     Comment: 1 can coke daily stopped using. no caffeine now       OBJECTIVE  LMP  (LMP Unknown)     Problem List:  Debbie Zee does not have any pertinent problems on file. PHYS EX:  Physical Exam  Vitals and nursing note reviewed. Constitutional:       General: She is not in acute distress. Appearance: She is well-developed. She is obese. She is not ill-appearing, toxic-appearing or diaphoretic. HENT:      Head: Normocephalic and atraumatic. Right Ear: External ear normal.      Left Ear: External ear normal.      Nose: Nose normal. No congestion or rhinorrhea. Mouth/Throat:      Mouth: Mucous membranes are moist.      Pharynx: No oropharyngeal exudate or posterior oropharyngeal erythema. Eyes:      General: No scleral icterus. Right eye: No discharge. Left eye: No discharge. Extraocular Movements: Extraocular movements intact. Conjunctiva/sclera: Conjunctivae normal.      Pupils: Pupils are equal, round, and reactive to light. Neck:      Thyroid: No thyromegaly. Vascular: No carotid bruit or JVD. Trachea: No tracheal deviation. Cardiovascular:      Rate and Rhythm: Normal rate. Rhythm irregularly irregular. Pulses: Normal pulses. Heart sounds: Heart sounds are distant. No murmur heard. No systolic murmur is present. No diastolic murmur is present. No friction rub. No gallop. Pulmonary:      Effort: Pulmonary effort is normal. No respiratory distress. Breath sounds: Normal breath sounds. No stridor. No wheezing, rhonchi or rales. Chest:      Chest wall: No tenderness. Abdominal:      General: Bowel sounds are normal. There is no distension. Palpations: Abdomen is soft. There is no mass. Tenderness: There is no abdominal tenderness. There is no right CVA tenderness, left CVA tenderness, guarding or rebound. Hernia: No hernia is present.    Musculoskeletal:         General: Tenderness

## 2023-07-11 ENCOUNTER — CLINICAL DOCUMENTATION (OUTPATIENT)
Dept: FAMILY MEDICINE CLINIC | Age: 68
End: 2023-07-11

## 2023-07-11 ASSESSMENT — ENCOUNTER SYMPTOMS
RECTAL PAIN: 0
RESPIRATORY NEGATIVE: 1
GASTROINTESTINAL NEGATIVE: 1
TROUBLE SWALLOWING: 0
RHINORRHEA: 0
VOICE CHANGE: 0
COUGH: 0
ABDOMINAL DISTENTION: 0
CHOKING: 0
NAUSEA: 0
CHEST TIGHTNESS: 0
EYE DISCHARGE: 0
DIARRHEA: 0
EYE REDNESS: 0
VOMITING: 0
EYE ITCHING: 0
EYE PAIN: 0
SHORTNESS OF BREATH: 0
BLOOD IN STOOL: 0
FACIAL SWELLING: 0
STRIDOR: 0
WHEEZING: 0
BACK PAIN: 1
CONSTIPATION: 0
SORE THROAT: 0
COLOR CHANGE: 0
APNEA: 0
PHOTOPHOBIA: 0
ALLERGIC/IMMUNOLOGIC NEGATIVE: 1
ANAL BLEEDING: 0
ABDOMINAL PAIN: 0
SINUS PAIN: 0
SINUS PRESSURE: 0

## 2023-07-11 NOTE — PROGRESS NOTES
acetaminophen (TYLENOL) 500 MG tablet Take 2 tablets by mouth every 6 hours as needed for Pain Indications: -NTE: 3G/24HRS-      apixaban (ELIQUIS) 5 MG TABS tablet Take 1 tablet by mouth 2 times daily      gabapentin (NEURONTIN) 100 MG capsule Take 2 capsules by mouth 3 times daily. No facility-administered encounter medications on file as of 7/11/2023.        Reviewed recent labs related to Dara's current problems      Discussed importance of regular Health Maintenance follow up  Health Maintenance   Topic    Depression Monitoring     DTaP/Tdap/Td vaccine (1 - Tdap)    DEXA (modify frequency per FRAX score)     Shingles vaccine (2 of 3)    Colorectal Cancer Screen     Diabetic retinal exam     Breast cancer screen     Diabetic foot exam     Diabetic Alb to Cr ratio (uACR) test     COVID-19 Vaccine (3 - Booster for [employer] series)    Annual Wellness Visit (AWV)     Flu vaccine (1)    A1C test (Diabetic or Prediabetic)     Lipids     GFR test (Diabetes, CKD 3-4, OR last GFR 15-59)     Pneumococcal 65+ years Vaccine     Hepatitis C screen     Hepatitis A vaccine     Hib vaccine     Meningococcal (ACWY) vaccine      Lu Davila MD   PGY2, FM

## 2023-07-18 ENCOUNTER — OUTSIDE SERVICES (OUTPATIENT)
Dept: FAMILY MEDICINE CLINIC | Age: 68
End: 2023-07-18
Payer: COMMERCIAL

## 2023-07-18 DIAGNOSIS — R76.8 POSITIVE HEPATITIS C ANTIBODY TEST: ICD-10-CM

## 2023-07-18 DIAGNOSIS — J96.22 ACUTE AND CHRONIC RESPIRATORY FAILURE WITH HYPERCAPNIA (HCC): ICD-10-CM

## 2023-07-18 DIAGNOSIS — E11.42 DIABETIC POLYNEUROPATHY ASSOCIATED WITH TYPE 2 DIABETES MELLITUS (HCC): Primary | Chronic | ICD-10-CM

## 2023-07-18 DIAGNOSIS — I27.20 PULMONARY HYPERTENSION (HCC): Chronic | ICD-10-CM

## 2023-07-18 DIAGNOSIS — N17.9 AKI (ACUTE KIDNEY INJURY) (HCC): ICD-10-CM

## 2023-07-18 DIAGNOSIS — I48.0 PAF (PAROXYSMAL ATRIAL FIBRILLATION) (HCC): Chronic | ICD-10-CM

## 2023-07-18 DIAGNOSIS — I50.32 CHRONIC DIASTOLIC CONGESTIVE HEART FAILURE (HCC): ICD-10-CM

## 2023-07-18 PROBLEM — E11.9 DM (DIABETES MELLITUS) (HCC): Status: RESOLVED | Noted: 2022-10-04 | Resolved: 2023-07-18

## 2023-07-18 PROCEDURE — 99309 SBSQ NF CARE MODERATE MDM 30: CPT | Performed by: FAMILY MEDICINE

## 2023-07-18 ASSESSMENT — ENCOUNTER SYMPTOMS
RESPIRATORY NEGATIVE: 1
ABDOMINAL PAIN: 0
SORE THROAT: 0
ANAL BLEEDING: 0
APNEA: 0
GASTROINTESTINAL NEGATIVE: 1
EYE REDNESS: 0
ABDOMINAL DISTENTION: 0
COLOR CHANGE: 0
CHEST TIGHTNESS: 0
PHOTOPHOBIA: 0
STRIDOR: 0
EYE DISCHARGE: 0
FACIAL SWELLING: 0
VOMITING: 0
COUGH: 0
WHEEZING: 0
BLOOD IN STOOL: 0
SINUS PRESSURE: 0
EYE PAIN: 0
DIARRHEA: 0
BACK PAIN: 1
NAUSEA: 0
SHORTNESS OF BREATH: 0
RHINORRHEA: 0
RECTAL PAIN: 0
VOICE CHANGE: 0
EYE ITCHING: 0
CHOKING: 0
TROUBLE SWALLOWING: 0
CONSTIPATION: 0
SINUS PAIN: 0
ALLERGIC/IMMUNOLOGIC NEGATIVE: 1

## 2023-08-09 ENCOUNTER — OUTSIDE SERVICES (OUTPATIENT)
Dept: FAMILY MEDICINE CLINIC | Age: 68
End: 2023-08-09
Payer: COMMERCIAL

## 2023-08-09 DIAGNOSIS — E11.65 POORLY CONTROLLED DIABETES MELLITUS (HCC): ICD-10-CM

## 2023-08-09 DIAGNOSIS — I50.43 CHF (CONGESTIVE HEART FAILURE), NYHA CLASS I, ACUTE ON CHRONIC, COMBINED (HCC): ICD-10-CM

## 2023-08-09 DIAGNOSIS — J96.12 CHRONIC RESPIRATORY FAILURE WITH HYPERCAPNIA (HCC): ICD-10-CM

## 2023-08-09 DIAGNOSIS — Z79.01 CHRONIC ANTICOAGULATION: Chronic | ICD-10-CM

## 2023-08-09 DIAGNOSIS — I48.0 PAF (PAROXYSMAL ATRIAL FIBRILLATION) (HCC): Chronic | ICD-10-CM

## 2023-08-09 DIAGNOSIS — J44.9 COPD WITH ASTHMA (HCC): ICD-10-CM

## 2023-08-09 DIAGNOSIS — Q21.12 PFO (PATENT FORAMEN OVALE): Primary | ICD-10-CM

## 2023-08-09 DIAGNOSIS — I50.33 DIASTOLIC CHF, ACUTE ON CHRONIC (HCC): ICD-10-CM

## 2023-08-09 DIAGNOSIS — I50.32 CHRONIC DIASTOLIC CONGESTIVE HEART FAILURE (HCC): ICD-10-CM

## 2023-08-09 DIAGNOSIS — I27.20 MODERATE PULMONARY HYPERTENSION (HCC): ICD-10-CM

## 2023-08-09 DIAGNOSIS — E66.01 MORBID OBESITY WITH BMI OF 45.0-49.9, ADULT (HCC): ICD-10-CM

## 2023-08-09 DIAGNOSIS — I48.0 PAROXYSMAL ATRIAL FIBRILLATION (HCC): ICD-10-CM

## 2023-08-09 DIAGNOSIS — I82.5Z1 CHRONIC DEEP VEIN THROMBOSIS (DVT) OF DISTAL VEIN OF RIGHT LOWER EXTREMITY (HCC): ICD-10-CM

## 2023-08-09 PROBLEM — I50.31 ACUTE DIASTOLIC CONGESTIVE HEART FAILURE (HCC): Status: RESOLVED | Noted: 2018-12-15 | Resolved: 2023-08-09

## 2023-08-09 PROBLEM — I50.9 ACUTE ON CHRONIC CONGESTIVE HEART FAILURE (HCC): Status: RESOLVED | Noted: 2023-04-03 | Resolved: 2023-08-09

## 2023-08-09 PROCEDURE — 99309 SBSQ NF CARE MODERATE MDM 30: CPT | Performed by: FAMILY MEDICINE

## 2023-08-09 ASSESSMENT — ENCOUNTER SYMPTOMS
RESPIRATORY NEGATIVE: 1
SINUS PRESSURE: 0
STRIDOR: 0
SINUS PAIN: 0
COLOR CHANGE: 0
BACK PAIN: 1
BLOOD IN STOOL: 0
EYE DISCHARGE: 0
PHOTOPHOBIA: 0
DIARRHEA: 0
VOICE CHANGE: 0
EYE PAIN: 0
CHOKING: 0
SHORTNESS OF BREATH: 0
VOMITING: 0
RECTAL PAIN: 0
COUGH: 0
CHEST TIGHTNESS: 0
APNEA: 0
WHEEZING: 0
FACIAL SWELLING: 0
TROUBLE SWALLOWING: 0
SORE THROAT: 0
EYE ITCHING: 0
NAUSEA: 0
ABDOMINAL PAIN: 0
CONSTIPATION: 0
EYE REDNESS: 0
ANAL BLEEDING: 0
ABDOMINAL DISTENTION: 0
GASTROINTESTINAL NEGATIVE: 1
ALLERGIC/IMMUNOLOGIC NEGATIVE: 1
RHINORRHEA: 0

## 2023-08-09 NOTE — PROGRESS NOTES
Allergic/Immunologic: Negative. Negative for environmental allergies, food allergies and immunocompromised state. Neurological:  Positive for weakness. Negative for dizziness, tremors, seizures, syncope, facial asymmetry, speech difficulty, light-headedness, numbness and headaches. Hematological:  Negative for adenopathy. Bruises/bleeds easily. Psychiatric/Behavioral:  Positive for decreased concentration and dysphoric mood. Negative for agitation, behavioral problems, confusion, hallucinations, self-injury, sleep disturbance and suicidal ideas. The patient is nervous/anxious. The patient is not hyperactive. Past Medical/Surgical Hx;        Diagnosis Date    (HFpEF) heart failure with preserved ejection fraction ([address])     1/16/2018- echo- LVEF 55-65%    Anal fissure     Anemia 10/6/2017    Anxiety and depression     Arthritis     Asthma     Atrial fibrillation (HCC)     Eliquis    Blood transfusion     long time no reaction    CHF (congestive heart failure) ([facility])     Diastolic    COPD (chronic obstructive pulmonary disease) ([facility])     Disc disorder     DM2 (diabetes mellitus, type 2) ([address])     on insulin    Fall due to stumbling 10/6/2017    GERD (gastroesophageal reflux disease)     History of blood transfusion     Hx of blood clots     Hyperlipidemia     Hypertension     Inappropriate sinus tachycardia     LVH (left ventricular hypertrophy)     moderate    Lymphadenitis, chronic 4/13/2018    Occipital neuralgia 11/2/2011    Respiratory acidosis 10/7/2017    Sinus tachycardia 2/16/2015     Past Surgical History:   Procedure Laterality Date    ANOSCOPY  10/25/2006    injection of anal sphincter with Botox, Franklyn Ortiz/Timmy, [facility]    ANOSCOPY  4/9/2007    injection of anal sphincter with Botox, Dr. Alec Peters, [facility]    ANOSCOPY  6/13/2007    injection of anal sphincter with Botox, Franklyn Samuel [facility]    ANUS SURGERY  4/4/2006    Lateral sphincterotomy for chronic anal fissure, Dr. Soledad Sen, [facility]

## 2023-08-16 ENCOUNTER — CLINICAL DOCUMENTATION (OUTPATIENT)
Dept: FAMILY MEDICINE CLINIC | Age: 68
End: 2023-08-16

## 2023-08-16 ASSESSMENT — ENCOUNTER SYMPTOMS
ABDOMINAL DISTENTION: 0
VOICE CHANGE: 0
EYE PAIN: 0
WHEEZING: 0
RECTAL PAIN: 0
NAUSEA: 0
FACIAL SWELLING: 0
SORE THROAT: 0
BLOOD IN STOOL: 0
CONSTIPATION: 0
ANAL BLEEDING: 0
COLOR CHANGE: 0
ALLERGIC/IMMUNOLOGIC NEGATIVE: 1
ABDOMINAL PAIN: 0
EYE DISCHARGE: 0
RESPIRATORY NEGATIVE: 1
DIARRHEA: 0
CHEST TIGHTNESS: 0
SINUS PRESSURE: 0
APNEA: 0
EYE REDNESS: 0
GASTROINTESTINAL NEGATIVE: 1
RHINORRHEA: 0
PHOTOPHOBIA: 0
SINUS PAIN: 0
SHORTNESS OF BREATH: 0
BACK PAIN: 1
STRIDOR: 0
CHOKING: 0
COUGH: 0
VOMITING: 0
EYE ITCHING: 0
TROUBLE SWALLOWING: 0

## 2023-08-16 NOTE — PROGRESS NOTES
Juancarlos Patino is a 69 y.o. female. Seen: 8/3/2023     HPI/Chief C/O:  Admitted with acute on chronic CHF and respiratory failure   Pt denies any active complaint. Denies Nv, Bm changes. Allergies   Allergen Reactions    Statins Myalgia and Rash   HPI  Patient states that she is doing well. On O2 via [place]. Denies CP, SOB, heart palpitations. States that she continues to have pain in B/L LE, but this is no better or worse than usual.  Medically stable and no issues as per nursing     ROS:  Review of Systems   Constitutional:  Positive for fatigue. Negative for activity change, appetite change, chills, diaphoresis, fever and unexpected weight change. HENT: Negative. Negative for congestion, dental problem, drooling, ear discharge, ear pain, facial swelling, hearing loss, mouth sores, nosebleeds, postnasal drip, rhinorrhea, sinus pressure, sinus pain, sneezing, sore throat, tinnitus, trouble swallowing and voice change. Eyes:  Negative for photophobia, pain, discharge, redness, itching and visual disturbance. Respiratory: Negative. Negative for apnea, cough, choking, chest tightness, shortness of breath, wheezing and stridor. Cardiovascular: Negative. Negative for chest pain, palpitations and leg swelling. Gastrointestinal: Negative. Negative for abdominal distention, abdominal pain, anal bleeding, blood in stool, constipation, diarrhea, nausea, rectal pain and vomiting. Endocrine: Negative. Negative for cold intolerance, heat intolerance, polydipsia, polyphagia and polyuria. Genitourinary: Negative. Negative for decreased urine volume, difficulty urinating, dysuria, enuresis, flank pain, frequency, genital sores, hematuria and urgency. Musculoskeletal:  Positive for arthralgias, back pain, gait problem and myalgias. Negative for joint swelling, neck pain and neck stiffness. Skin:  Positive for wound (both lower legs). Negative for color change, pallor and rash.

## 2023-09-12 ENCOUNTER — HOSPITAL ENCOUNTER (OUTPATIENT)
Dept: OTHER | Age: 68
Setting detail: THERAPIES SERIES
Discharge: HOME | End: 2023-09-12
Payer: COMMERCIAL

## 2023-09-12 VITALS
DIASTOLIC BLOOD PRESSURE: 55 MMHG | HEART RATE: 82 BPM | RESPIRATION RATE: 18 BRPM | BODY MASS INDEX: 41.02 KG/M2 | OXYGEN SATURATION: 93 % | WEIGHT: 239 LBS | SYSTOLIC BLOOD PRESSURE: 119 MMHG

## 2023-09-12 LAB
ANION GAP SERPL CALCULATED.3IONS-SCNC: 9 MMOL/L (ref 7–16)
BNP SERPL-MCNC: 147 PG/ML (ref 0–125)
BUN SERPL-MCNC: 26 MG/DL (ref 6–23)
CALCIUM SERPL-MCNC: 9.2 MG/DL (ref 8.6–10.2)
CHLORIDE SERPL-SCNC: 94 MMOL/L (ref 98–107)
CO2 SERPL-SCNC: 32 MMOL/L (ref 22–29)
CREAT SERPL-MCNC: 1 MG/DL (ref 0.5–1)
GFR SERPL CREATININE-BSD FRML MDRD: >60 ML/MIN/1.73M2
GLUCOSE SERPL-MCNC: 225 MG/DL (ref 74–99)
POTASSIUM SERPL-SCNC: 3.9 MMOL/L (ref 3.5–5)
SODIUM SERPL-SCNC: 135 MMOL/L (ref 132–146)

## 2023-09-12 PROCEDURE — 80048 BASIC METABOLIC PNL TOTAL CA: CPT

## 2023-09-12 PROCEDURE — 83880 ASSAY OF NATRIURETIC PEPTIDE: CPT

## 2023-09-12 PROCEDURE — 99214 OFFICE O/P EST MOD 30 MIN: CPT

## 2023-09-12 RX ORDER — LANOLIN ALCOHOL/MO/W.PET/CERES
3 CREAM (GRAM) TOPICAL DAILY
COMMUNITY

## 2023-09-12 ASSESSMENT — PATIENT HEALTH QUESTIONNAIRE - PHQ9
SUM OF ALL RESPONSES TO PHQ9 QUESTIONS 1 & 2: 0
1. LITTLE INTEREST OR PLEASURE IN DOING THINGS: NOT AT ALL
2. FEELING DOWN, DEPRESSED OR HOPELESS: NOT AT ALL

## 2023-09-12 NOTE — PLAN OF CARE
Problem: Chronic Conditions and Co-morbidities  Goal: Patient's chronic conditions and co-morbidity symptoms are monitored and maintained or improved  Flowsheets (Taken 9/12/2023 8047)  Care Plan - Patient's Chronic Conditions and Co-Morbidity Symptoms are Monitored and Maintained or Improved: Monitor and assess patient's chronic conditions and comorbid symptoms for stability, deterioration, or improvement

## 2023-09-12 NOTE — PROGRESS NOTES
Congestive Heart Failure [id number] [facility]       Referring Provider: Dr Yannick Oro  Primary Care Physician: Ron Rawls DO   Cardiologist: Dr aRjwinder Obrien  Nephrologist: N/A      HISTORY OF PRESENT ILLNESS:     Romi Carrillo is a 69 y.o. (1955) female with a history of HFpEF, most recent EF:  Lab Results   Component Value Date    LVEF 68 04/04/2023    Car Dk Echo 01/16/2018         She presents to the CHF clinic for ongoing evaluation and monitoring of heart failure.     In the CHF clinic today she denies any adverse symptoms except:  [] Dizziness or lightheadedness   [] Syncope or near syncope  [] Recent Fall  [] Shortness of breath at rest   [x] Dyspnea with exertion ( some )  [] Decline in functional capacity (unable to perform activities they had previously been able to do)  [] Fatigue   [] Orthopnea  [] PND  [] Nocturnal cough  [] Hemoptysis  [] Chest pain, pressure, or discomfort  [] Palpitations  [x] Abdominal distention  [] Abdominal bloating  [] Early satiety  [] Blood in stool   [] Diarrhea  [] Constipation  [] Nausea/Vomiting  [] Decreased urinary response to oral diuretic   [] Scrotal swelling   [] Lower extremity edema  [] Used PRN doses of oral diuretic   [] Weight gain    Wt Readings from Last 3 Encounters:   09/12/23 239 lb (108.4 kg)   06/13/23 230 lb (104.3 kg)   05/02/23 229 lb (103.9 kg)           SOCIAL HISTORY:  [x] Denies tobacco, alcohol or illicit drug abuse  [] Tobacco use:  [] ETOH use:  [] Illicit drug use:        MEDICATIONS:    Allergies   Allergen Reactions    Statins Myalgia and Rash       Current Outpatient Medications:     melatonin 3 MG TABS tablet, Take 1 tablet by mouth daily, Disp: , Rfl:     insulin lispro (HUMALOG) 100 UNIT/ML injection vial, Inject into the skin as needed for High Blood Sugar Sliding scale, Disp: , Rfl:     bisacodyl (DULCOLAX) 10 MG suppository, Place 1 suppository rectally daily as needed for Constipation, Disp: ,

## 2023-09-18 ENCOUNTER — CLINICAL DOCUMENTATION (OUTPATIENT)
Dept: FAMILY MEDICINE CLINIC | Age: 68
End: 2023-09-18

## 2023-09-18 ASSESSMENT — ENCOUNTER SYMPTOMS
FACIAL SWELLING: 0
SHORTNESS OF BREATH: 0
SINUS PAIN: 0
ANAL BLEEDING: 0
EYE DISCHARGE: 0
RHINORRHEA: 0
COUGH: 0
CHEST TIGHTNESS: 0
BLOOD IN STOOL: 0
VOICE CHANGE: 0
EYE REDNESS: 0
RESPIRATORY NEGATIVE: 1
SINUS PRESSURE: 0
EYE ITCHING: 0
COLOR CHANGE: 0
ABDOMINAL PAIN: 0
APNEA: 0
CHOKING: 0
ABDOMINAL DISTENTION: 0
TROUBLE SWALLOWING: 0
PHOTOPHOBIA: 0
DIARRHEA: 0
SORE THROAT: 0
RECTAL PAIN: 0
BACK PAIN: 1
CONSTIPATION: 0
NAUSEA: 0
STRIDOR: 0
ALLERGIC/IMMUNOLOGIC NEGATIVE: 1
EYE PAIN: 0
GASTROINTESTINAL NEGATIVE: 1
VOMITING: 0
WHEEZING: 0

## 2023-09-18 NOTE — PROGRESS NOTES
DULoxetine (CYMBALTA) 60 MG extended release capsule Take 1 capsule by mouth nightly *SEE OTHER ORDER*      hydrOXYzine (ATARAX) 50 MG tablet Take 1 tablet by mouth 3 times daily      ipratropium (ATROVENT) 0.02 % nebulizer solution Take 2.5 mLs by nebulization 3 times daily *SEE OTHER ORDER*      ezetimibe (ZETIA) 10 MG tablet Take 1 tablet by mouth nightly 30 tablet 3    acetaminophen (TYLENOL) 500 MG tablet Take 2 tablets by mouth every 6 hours as needed for Pain Indications: -NTE: 3G/24HRS-      apixaban (ELIQUIS) 5 MG TABS tablet Take 1 tablet by mouth 2 times daily      gabapentin (NEURONTIN) 100 MG capsule Take 2 capsules by mouth 3 times daily. No facility-administered encounter medications on file as of 9/18/2023.        Reviewed recent labs related to Dara's current problems      Discussed importance of regular Health Maintenance follow up  Health Maintenance   Topic    Depression Monitoring     DTaP/Tdap/Td vaccine (1 - Tdap)    DEXA (modify frequency per FRAX score)     Hepatitis B vaccine (1 of 3 - Risk 3-dose series)    Shingles vaccine (2 of 3)    Colorectal Cancer Screen     Pneumococcal 65+ years Vaccine (2 - PCV)    Diabetic retinal exam     Breast cancer screen     Diabetic foot exam     Diabetic Alb to Cr ratio (uACR) test     COVID-19 Vaccine (3 - Moderna series)    Annual Wellness Visit (AWV)     Flu vaccine (1)    A1C test (Diabetic or Prediabetic)     Lipids     GFR test (Diabetes, CKD 3-4, OR last GFR 15-59)     Hepatitis C screen     Hepatitis A vaccine     Hib vaccine     Meningococcal (ACWY) vaccine            Lorin Cisneros MD

## 2023-09-20 ENCOUNTER — OUTSIDE SERVICES (OUTPATIENT)
Dept: FAMILY MEDICINE CLINIC | Age: 68
End: 2023-09-20
Payer: COMMERCIAL

## 2023-09-20 DIAGNOSIS — L97.929 ULCERS OF BOTH LOWER LEGS (HCC): ICD-10-CM

## 2023-09-20 DIAGNOSIS — I82.5Z1 CHRONIC DEEP VEIN THROMBOSIS (DVT) OF DISTAL VEIN OF RIGHT LOWER EXTREMITY (HCC): ICD-10-CM

## 2023-09-20 DIAGNOSIS — I50.43 CHF (CONGESTIVE HEART FAILURE), NYHA CLASS I, ACUTE ON CHRONIC, COMBINED (HCC): Primary | ICD-10-CM

## 2023-09-20 DIAGNOSIS — L97.919 ULCERS OF BOTH LOWER LEGS (HCC): ICD-10-CM

## 2023-09-20 DIAGNOSIS — I48.0 PAROXYSMAL ATRIAL FIBRILLATION (HCC): ICD-10-CM

## 2023-09-20 DIAGNOSIS — J96.11 CHRONIC RESPIRATORY FAILURE WITH HYPOXIA (HCC): ICD-10-CM

## 2023-09-20 DIAGNOSIS — E11.65 POORLY CONTROLLED DIABETES MELLITUS (HCC): ICD-10-CM

## 2023-09-20 DIAGNOSIS — E66.01 MORBID OBESITY WITH BMI OF 45.0-49.9, ADULT (HCC): ICD-10-CM

## 2023-09-20 DIAGNOSIS — E27.8 ADRENAL MASS (HCC): ICD-10-CM

## 2023-09-20 DIAGNOSIS — J96.12 CHRONIC RESPIRATORY FAILURE WITH HYPERCAPNIA (HCC): ICD-10-CM

## 2023-09-20 PROBLEM — I50.9 ACUTE ON CHRONIC CONGESTIVE HEART FAILURE, UNSPECIFIED HEART FAILURE TYPE (HCC): Status: RESOLVED | Noted: 2023-04-03 | Resolved: 2023-09-20

## 2023-09-20 PROBLEM — I50.32 CHRONIC DIASTOLIC CONGESTIVE HEART FAILURE (HCC): Status: RESOLVED | Noted: 2018-01-15 | Resolved: 2023-09-20

## 2023-09-20 PROBLEM — J96.22 ACUTE AND CHRONIC RESPIRATORY FAILURE WITH HYPERCAPNIA (HCC): Status: RESOLVED | Noted: 2022-10-04 | Resolved: 2023-09-20

## 2023-09-20 PROBLEM — J96.21 ACUTE ON CHRONIC RESPIRATORY FAILURE WITH HYPOXIA (HCC): Status: RESOLVED | Noted: 2018-11-06 | Resolved: 2023-09-20

## 2023-09-20 PROCEDURE — 99309 SBSQ NF CARE MODERATE MDM 30: CPT | Performed by: FAMILY MEDICINE

## 2023-09-20 ASSESSMENT — ENCOUNTER SYMPTOMS
SORE THROAT: 0
APNEA: 0
ABDOMINAL PAIN: 0
SINUS PAIN: 0
RHINORRHEA: 0
GASTROINTESTINAL NEGATIVE: 1
TROUBLE SWALLOWING: 0
CHEST TIGHTNESS: 0
NAUSEA: 0
SINUS PRESSURE: 0
RECTAL PAIN: 0
CONSTIPATION: 0
COUGH: 0
RESPIRATORY NEGATIVE: 1
EYE REDNESS: 0
COLOR CHANGE: 0
ABDOMINAL DISTENTION: 0
WHEEZING: 0
PHOTOPHOBIA: 0
ALLERGIC/IMMUNOLOGIC NEGATIVE: 1
STRIDOR: 0
CHOKING: 0
BACK PAIN: 1
EYE ITCHING: 0
DIARRHEA: 0
BLOOD IN STOOL: 0
VOICE CHANGE: 0
FACIAL SWELLING: 0
EYE DISCHARGE: 0
SHORTNESS OF BREATH: 0
EYE PAIN: 0
ANAL BLEEDING: 0
VOMITING: 0

## 2023-09-20 NOTE — PROGRESS NOTES
Lupe Curran is a 69 y.o. female. Seen: 9/11/23    HPI/Chief C/O:  Admitted with acute on chronic CHF and respiratory failure   Pt denies any active complaint. Denies Nv, Bm changes. Allergies   Allergen Reactions    Statins Myalgia and Rash   HPI    Patient was seen sitting up in her chair. She complains of a headache that has been going on for a few hours, and request Tylenol. She otherwise has no concerns. She is on her baseline level of oxygen. States that legs are swollen but this is also at her baseline. Denies chest pain, shortness of breath, fevers, chills      ROS:  Review of Systems   Constitutional:  Positive for fatigue. Negative for activity change, appetite change, chills, diaphoresis, fever and unexpected weight change. HENT: Negative. Negative for congestion, dental problem, drooling, ear discharge, ear pain, facial swelling, hearing loss, mouth sores, nosebleeds, postnasal drip, rhinorrhea, sinus pressure, sinus pain, sneezing, sore throat, tinnitus, trouble swallowing and voice change. Eyes:  Negative for photophobia, pain, discharge, redness, itching and visual disturbance. Respiratory: Negative. Negative for apnea, cough, choking, chest tightness, shortness of breath, wheezing and stridor. Cardiovascular: Negative. Negative for chest pain, palpitations and leg swelling. Gastrointestinal: Negative. Negative for abdominal distention, abdominal pain, anal bleeding, blood in stool, constipation, diarrhea, nausea, rectal pain and vomiting. Endocrine: Negative. Negative for cold intolerance, heat intolerance, polydipsia, polyphagia and polyuria. Genitourinary: Negative. Negative for decreased urine volume, difficulty urinating, dysuria, enuresis, flank pain, frequency, genital sores, hematuria and urgency. Musculoskeletal:  Positive for arthralgias, back pain, gait problem and myalgias. Negative for joint swelling, neck pain and neck stiffness.    Skin:

## 2023-09-28 ENCOUNTER — CLINICAL DOCUMENTATION (OUTPATIENT)
Dept: FAMILY MEDICINE CLINIC | Age: 68
End: 2023-09-28

## 2023-09-28 ASSESSMENT — ENCOUNTER SYMPTOMS
CHEST TIGHTNESS: 0
SHORTNESS OF BREATH: 0
NAUSEA: 0
DIARRHEA: 0
VOICE CHANGE: 0
FACIAL SWELLING: 0
BACK PAIN: 1
COUGH: 0
EYE REDNESS: 0
RESPIRATORY NEGATIVE: 1
SORE THROAT: 0
EYE DISCHARGE: 0
PHOTOPHOBIA: 0
VOMITING: 0
ANAL BLEEDING: 0
GASTROINTESTINAL NEGATIVE: 1
SINUS PRESSURE: 0
EYE ITCHING: 0
CHOKING: 0
COLOR CHANGE: 0
ABDOMINAL PAIN: 0
STRIDOR: 0
SINUS PAIN: 0
TROUBLE SWALLOWING: 0
ALLERGIC/IMMUNOLOGIC NEGATIVE: 1
APNEA: 0
EYE PAIN: 0
RECTAL PAIN: 0
BLOOD IN STOOL: 0
CONSTIPATION: 0
RHINORRHEA: 0
ABDOMINAL DISTENTION: 0
WHEEZING: 0

## 2023-09-28 NOTE — PROGRESS NOTES
Medication Sig Dispense Refill    melatonin 3 MG TABS tablet Take 1 tablet by mouth daily      insulin lispro (HUMALOG) 100 UNIT/ML injection vial Inject into the skin as needed for High Blood Sugar Sliding scale      bisacodyl (DULCOLAX) 10 MG suppository Place 1 suppository rectally daily as needed for Constipation      diclofenac sodium (VOLTAREN) 1 % GEL Apply topically 2 times daily      sodium phosphate (FLEET) 7-19 GM/118ML Place 1 enema rectally once as needed      magnesium hydroxide (MILK OF MAGNESIA) 400 MG/5ML suspension Take by mouth daily as needed for Constipation      metoprolol succinate (TOPROL XL) 100 MG extended release tablet Take 0.5 tablets by mouth 2 times daily (Patient taking differently: Take 0.5 tablets by mouth daily Pt restarted on 100 mg daily by Dr. Roslyn Dowling at Nursing facility.) 60 tablet 3    potassium chloride (KLOR-CON M) 20 MEQ extended release tablet Take 1 tablet by mouth 2 times daily (with meals) 60 tablet 3    ferrous sulfate (IRON 325) 325 (65 Fe) MG tablet Take 1 tablet by mouth daily (with breakfast)      insulin aspart (NOVOLOG FLEXPEN) 100 UNIT/ML injection pen Inject 6 Units into the skin 3 times daily (before meals)      empagliflozin (JARDIANCE) 10 MG tablet Take 1 tablet by mouth daily 30 tablet 5    bumetanide (BUMEX) 2 MG tablet Take 1 tablet by mouth daily 30 tablet 3    amiodarone (PACERONE) 100 MG tablet Take 1 tablet by mouth daily      pantoprazole (PROTONIX) 40 MG tablet Take 1 tablet by mouth daily      traZODone (DESYREL) 50 MG tablet Take 1 tablet by mouth nightly      senna (SENOKOT) 8.6 MG tablet Take 2 tablets by mouth daily      insulin glargine (LANTUS) 100 UNIT/ML injection vial Inject 30 Units into the skin 2 times daily *SEE OTHER ORDER* (Patient taking differently: Inject 42 Units into the skin 2 times daily *SEE OTHER ORDER*) 10 mL 3    DULoxetine (CYMBALTA) 60 MG extended release capsule Take 1 capsule by mouth nightly *SEE OTHER ORDER*

## 2023-10-17 ENCOUNTER — OUTSIDE SERVICES (OUTPATIENT)
Dept: FAMILY MEDICINE CLINIC | Age: 68
End: 2023-10-17
Payer: COMMERCIAL

## 2023-10-17 DIAGNOSIS — J96.11 CHRONIC RESPIRATORY FAILURE WITH HYPOXIA (HCC): ICD-10-CM

## 2023-10-17 DIAGNOSIS — I10 ESSENTIAL HYPERTENSION: Primary | ICD-10-CM

## 2023-10-17 DIAGNOSIS — E66.01 MORBID OBESITY WITH BMI OF 45.0-49.9, ADULT (HCC): ICD-10-CM

## 2023-10-17 DIAGNOSIS — E11.42 DIABETIC POLYNEUROPATHY ASSOCIATED WITH TYPE 2 DIABETES MELLITUS (HCC): Chronic | ICD-10-CM

## 2023-10-17 DIAGNOSIS — J44.89 COPD WITH ASTHMA: ICD-10-CM

## 2023-10-17 DIAGNOSIS — I48.0 PAROXYSMAL ATRIAL FIBRILLATION (HCC): ICD-10-CM

## 2023-10-17 DIAGNOSIS — I27.20 MODERATE PULMONARY HYPERTENSION (HCC): ICD-10-CM

## 2023-10-17 DIAGNOSIS — R76.8 POSITIVE HEPATITIS C ANTIBODY TEST: ICD-10-CM

## 2023-10-17 DIAGNOSIS — I50.43 CHF (CONGESTIVE HEART FAILURE), NYHA CLASS I, ACUTE ON CHRONIC, COMBINED (HCC): ICD-10-CM

## 2023-10-17 DIAGNOSIS — J96.12 CHRONIC RESPIRATORY FAILURE WITH HYPERCAPNIA (HCC): ICD-10-CM

## 2023-10-17 DIAGNOSIS — E27.8 ADRENAL MASS (HCC): ICD-10-CM

## 2023-10-17 DIAGNOSIS — I51.7 LVH (LEFT VENTRICULAR HYPERTROPHY): ICD-10-CM

## 2023-10-17 PROBLEM — N17.9 AKI (ACUTE KIDNEY INJURY) (HCC): Status: RESOLVED | Noted: 2017-10-06 | Resolved: 2023-10-17

## 2023-10-17 PROBLEM — J44.1 COPD EXACERBATION (HCC): Status: RESOLVED | Noted: 2018-11-06 | Resolved: 2023-10-17

## 2023-10-17 PROBLEM — A41.9 SEPSIS (HCC): Status: RESOLVED | Noted: 2022-03-08 | Resolved: 2023-10-17

## 2023-10-17 PROCEDURE — 99309 SBSQ NF CARE MODERATE MDM 30: CPT | Performed by: FAMILY MEDICINE

## 2023-10-17 ASSESSMENT — ENCOUNTER SYMPTOMS
ABDOMINAL DISTENTION: 0
WHEEZING: 0
EYE REDNESS: 0
VOICE CHANGE: 0
EYE DISCHARGE: 0
ANAL BLEEDING: 0
RESPIRATORY NEGATIVE: 1
FACIAL SWELLING: 0
CONSTIPATION: 0
EYE ITCHING: 0
CHEST TIGHTNESS: 0
TROUBLE SWALLOWING: 0
APNEA: 0
COLOR CHANGE: 0
RECTAL PAIN: 0
DIARRHEA: 0
SINUS PRESSURE: 0
NAUSEA: 0
ALLERGIC/IMMUNOLOGIC NEGATIVE: 1
EYE PAIN: 0
SORE THROAT: 0
GASTROINTESTINAL NEGATIVE: 1
BLOOD IN STOOL: 0
PHOTOPHOBIA: 0
CHOKING: 0
VOMITING: 0
BACK PAIN: 1
SHORTNESS OF BREATH: 0
SINUS PAIN: 0
ABDOMINAL PAIN: 0
COUGH: 0
STRIDOR: 0
RHINORRHEA: 0

## 2023-10-17 NOTE — PROGRESS NOTES
of 10/17/2023   Medication Sig Dispense Refill    melatonin 3 MG TABS tablet Take 1 tablet by mouth daily      insulin lispro (HUMALOG) 100 UNIT/ML injection vial Inject into the skin as needed for High Blood Sugar Sliding scale      bisacodyl (DULCOLAX) 10 MG suppository Place 1 suppository rectally daily as needed for Constipation      diclofenac sodium (VOLTAREN) 1 % GEL Apply topically 2 times daily      sodium phosphate (FLEET) 7-19 GM/118ML Place 1 enema rectally once as needed      magnesium hydroxide (MILK OF MAGNESIA) 400 MG/5ML suspension Take by mouth daily as needed for Constipation      metoprolol succinate (TOPROL XL) 100 MG extended release tablet Take 0.5 tablets by mouth 2 times daily (Patient taking differently: Take 0.5 tablets by mouth daily Pt restarted on 100 mg daily by Dr. Marck Joseph at Nursing facility.) 60 tablet 3    potassium chloride (KLOR-CON M) 20 MEQ extended release tablet Take 1 tablet by mouth 2 times daily (with meals) 60 tablet 3    ferrous sulfate (IRON 325) 325 (65 Fe) MG tablet Take 1 tablet by mouth daily (with breakfast)      insulin aspart (NOVOLOG FLEXPEN) 100 UNIT/ML injection pen Inject 6 Units into the skin 3 times daily (before meals)      empagliflozin (JARDIANCE) 10 MG tablet Take 1 tablet by mouth daily 30 tablet 5    bumetanide (BUMEX) 2 MG tablet Take 1 tablet by mouth daily 30 tablet 3    amiodarone (PACERONE) 100 MG tablet Take 1 tablet by mouth daily      pantoprazole (PROTONIX) 40 MG tablet Take 1 tablet by mouth daily      traZODone (DESYREL) 50 MG tablet Take 1 tablet by mouth nightly      senna (SENOKOT) 8.6 MG tablet Take 2 tablets by mouth daily      insulin glargine (LANTUS) 100 UNIT/ML injection vial Inject 30 Units into the skin 2 times daily *SEE OTHER ORDER* (Patient taking differently: Inject 42 Units into the skin 2 times daily *SEE OTHER ORDER*) 10 mL 3    DULoxetine (CYMBALTA) 60 MG extended release capsule Take 1 capsule by mouth nightly *SEE

## 2023-10-25 LAB
BACTERIA URNS QL MICRO: ABNORMAL
BILIRUB UR QL STRIP: NEGATIVE
CLARITY UR: CLEAR
COLOR UR: YELLOW
EPI CELLS #/AREA URNS HPF: ABNORMAL /HPF
GLUCOSE UR STRIP-MCNC: >=1000 MG/DL
HGB UR QL STRIP.AUTO: NEGATIVE
KETONES UR STRIP-MCNC: NEGATIVE MG/DL
LEUKOCYTE ESTERASE UR QL STRIP: ABNORMAL
NITRITE UR QL STRIP: NEGATIVE
PH UR STRIP: 6 [PH] (ref 5–9)
PROT UR STRIP-MCNC: NEGATIVE MG/DL
RBC #/AREA URNS HPF: ABNORMAL /HPF
SP GR UR STRIP: 1.01 (ref 1–1.03)
UROBILINOGEN UR STRIP-ACNC: 0.2 EU/DL (ref 0–1)
WBC #/AREA URNS HPF: ABNORMAL /HPF

## 2023-10-26 LAB
MICROORGANISM SPEC CULT: ABNORMAL
SPECIMEN DESCRIPTION: ABNORMAL

## 2023-11-17 LAB
BILIRUB UR QL STRIP: NEGATIVE
CLARITY UR: CLEAR
COLOR UR: YELLOW
GLUCOSE UR STRIP-MCNC: >=1000 MG/DL
HGB UR QL STRIP.AUTO: NEGATIVE
KETONES UR STRIP-MCNC: NEGATIVE MG/DL
LEUKOCYTE ESTERASE UR QL STRIP: NEGATIVE
NITRITE UR QL STRIP: NEGATIVE
PH UR STRIP: 6 [PH] (ref 5–9)
PROT UR STRIP-MCNC: NEGATIVE MG/DL
RBC #/AREA URNS HPF: ABNORMAL /HPF
SP GR UR STRIP: <1.005 (ref 1–1.03)
UROBILINOGEN UR STRIP-ACNC: 0.2 EU/DL (ref 0–1)
WBC #/AREA URNS HPF: ABNORMAL /HPF

## 2023-11-18 LAB
MICROORGANISM SPEC CULT: NORMAL
SERVICE CMNT-IMP: NORMAL
SPECIMEN DESCRIPTION: NORMAL

## 2023-11-20 LAB
ANION GAP SERPL CALCULATED.3IONS-SCNC: 15 MMOL/L (ref 7–16)
BASOPHILS # BLD: 0.04 K/UL (ref 0–0.2)
BASOPHILS NFR BLD: 1 % (ref 0–2)
BUN SERPL-MCNC: 24 MG/DL (ref 6–23)
CALCIUM SERPL-MCNC: 9.1 MG/DL (ref 8.6–10.2)
CHLORIDE SERPL-SCNC: 95 MMOL/L (ref 98–107)
CO2 SERPL-SCNC: 31 MMOL/L (ref 22–29)
CREAT SERPL-MCNC: 1 MG/DL (ref 0.5–1)
EOSINOPHIL # BLD: 0.12 K/UL (ref 0.05–0.5)
EOSINOPHILS RELATIVE PERCENT: 2 % (ref 0–6)
ERYTHROCYTE [DISTWIDTH] IN BLOOD BY AUTOMATED COUNT: 13.7 % (ref 11.5–15)
GFR SERPL CREATININE-BSD FRML MDRD: >60 ML/MIN/1.73M2
GLUCOSE SERPL-MCNC: 223 MG/DL (ref 74–99)
HBA1C MFR BLD: 10.1 % (ref 4–5.6)
HCT VFR BLD AUTO: 43.1 % (ref 34–48)
HGB BLD-MCNC: 12.9 G/DL (ref 11.5–15.5)
IMM GRANULOCYTES # BLD AUTO: <0.03 K/UL (ref 0–0.58)
IMM GRANULOCYTES NFR BLD: 0 % (ref 0–5)
LYMPHOCYTES NFR BLD: 2.36 K/UL (ref 1.5–4)
LYMPHOCYTES RELATIVE PERCENT: 40 % (ref 20–42)
MCH RBC QN AUTO: 24.7 PG (ref 26–35)
MCHC RBC AUTO-ENTMCNC: 29.9 G/DL (ref 32–34.5)
MCV RBC AUTO: 82.6 FL (ref 80–99.9)
MICROORGANISM SPEC CULT: ABNORMAL
MONOCYTES NFR BLD: 0.48 K/UL (ref 0.1–0.95)
MONOCYTES NFR BLD: 8 % (ref 2–12)
NEUTROPHILS NFR BLD: 49 % (ref 43–80)
NEUTS SEG NFR BLD: 2.89 K/UL (ref 1.8–7.3)
PLATELET # BLD AUTO: 175 K/UL (ref 130–450)
PMV BLD AUTO: 10.7 FL (ref 7–12)
POTASSIUM SERPL-SCNC: 3.9 MMOL/L (ref 3.5–5)
RBC # BLD AUTO: 5.22 M/UL (ref 3.5–5.5)
SERVICE CMNT-IMP: ABNORMAL
SODIUM SERPL-SCNC: 141 MMOL/L (ref 132–146)
SPECIMEN DESCRIPTION: ABNORMAL
WBC OTHER # BLD: 5.9 K/UL (ref 4.5–11.5)

## 2023-11-21 ENCOUNTER — OUTSIDE SERVICES (OUTPATIENT)
Dept: FAMILY MEDICINE CLINIC | Age: 68
End: 2023-11-21
Payer: COMMERCIAL

## 2023-11-21 DIAGNOSIS — I48.0 PAROXYSMAL ATRIAL FIBRILLATION (HCC): ICD-10-CM

## 2023-11-21 DIAGNOSIS — I50.43 CHF (CONGESTIVE HEART FAILURE), NYHA CLASS I, ACUTE ON CHRONIC, COMBINED (HCC): ICD-10-CM

## 2023-11-21 DIAGNOSIS — E66.01 MORBID OBESITY WITH BMI OF 45.0-49.9, ADULT (HCC): Primary | ICD-10-CM

## 2023-11-21 DIAGNOSIS — J96.11 CHRONIC RESPIRATORY FAILURE WITH HYPOXIA (HCC): ICD-10-CM

## 2023-11-21 DIAGNOSIS — I27.20 MODERATE PULMONARY HYPERTENSION (HCC): ICD-10-CM

## 2023-11-21 PROCEDURE — 99309 SBSQ NF CARE MODERATE MDM 30: CPT | Performed by: FAMILY MEDICINE

## 2023-11-21 ASSESSMENT — ENCOUNTER SYMPTOMS
SORE THROAT: 0
ANAL BLEEDING: 0
STRIDOR: 0
RESPIRATORY NEGATIVE: 1
BLOOD IN STOOL: 0
DIARRHEA: 0
SINUS PAIN: 0
EYE ITCHING: 0
CHEST TIGHTNESS: 0
COLOR CHANGE: 0
NAUSEA: 0
SHORTNESS OF BREATH: 0
RECTAL PAIN: 0
ABDOMINAL DISTENTION: 0
BACK PAIN: 1
FACIAL SWELLING: 0
ABDOMINAL PAIN: 0
ALLERGIC/IMMUNOLOGIC NEGATIVE: 1
EYE PAIN: 0
EYE DISCHARGE: 0
COUGH: 0
CONSTIPATION: 0
RHINORRHEA: 0
TROUBLE SWALLOWING: 0
VOICE CHANGE: 0
GASTROINTESTINAL NEGATIVE: 1
EYE REDNESS: 0
PHOTOPHOBIA: 0
SINUS PRESSURE: 0
CHOKING: 0
WHEEZING: 0
APNEA: 0
VOMITING: 0

## 2023-11-21 NOTE — PROGRESS NOTES
Sliding scale      bisacodyl (DULCOLAX) 10 MG suppository Place 1 suppository rectally daily as needed for Constipation      diclofenac sodium (VOLTAREN) 1 % GEL Apply topically 2 times daily      sodium phosphate (FLEET) 7-19 GM/118ML Place 1 enema rectally once as needed      magnesium hydroxide (MILK OF MAGNESIA) 400 MG/5ML suspension Take by mouth daily as needed for Constipation      metoprolol succinate (TOPROL XL) 100 MG extended release tablet Take 0.5 tablets by mouth 2 times daily (Patient taking differently: Take 0.5 tablets by mouth daily Pt restarted on 100 mg daily by Dr. Ximena Barrios at Nursing facility.) 60 tablet 3    potassium chloride (KLOR-CON M) 20 MEQ extended release tablet Take 1 tablet by mouth 2 times daily (with meals) 60 tablet 3    ferrous sulfate (IRON 325) 325 (65 Fe) MG tablet Take 1 tablet by mouth daily (with breakfast)      insulin aspart (NOVOLOG FLEXPEN) 100 UNIT/ML injection pen Inject 6 Units into the skin 3 times daily (before meals)      empagliflozin (JARDIANCE) 10 MG tablet Take 1 tablet by mouth daily 30 tablet 5    bumetanide (BUMEX) 2 MG tablet Take 1 tablet by mouth daily 30 tablet 3    amiodarone (PACERONE) 100 MG tablet Take 1 tablet by mouth daily      pantoprazole (PROTONIX) 40 MG tablet Take 1 tablet by mouth daily      traZODone (DESYREL) 50 MG tablet Take 1 tablet by mouth nightly      senna (SENOKOT) 8.6 MG tablet Take 2 tablets by mouth daily      insulin glargine (LANTUS) 100 UNIT/ML injection vial Inject 30 Units into the skin 2 times daily *SEE OTHER ORDER* (Patient taking differently: Inject 42 Units into the skin 2 times daily *SEE OTHER ORDER*) 10 mL 3    DULoxetine (CYMBALTA) 60 MG extended release capsule Take 1 capsule by mouth nightly *SEE OTHER ORDER*      hydrOXYzine (ATARAX) 50 MG tablet Take 1 tablet by mouth 3 times daily      ipratropium (ATROVENT) 0.02 % nebulizer solution Take 2.5 mLs by nebulization 3 times daily *SEE OTHER ORDER*      ezetimibe

## 2023-12-12 ENCOUNTER — HOSPITAL ENCOUNTER (OUTPATIENT)
Dept: OTHER | Age: 68
Setting detail: THERAPIES SERIES
Discharge: HOME | End: 2023-12-12

## 2023-12-12 NOTE — PROGRESS NOTES
Patient a no show for today's appointment despite reminder call. Number on file does not go through, called first contact in chart which is her son Keke Inman. I left a voicemail requesting he give a return call to the clinic.

## 2024-01-17 ENCOUNTER — OUTSIDE SERVICES (OUTPATIENT)
Dept: FAMILY MEDICINE CLINIC | Age: 69
End: 2024-01-17
Payer: COMMERCIAL

## 2024-01-17 DIAGNOSIS — E66.01 MORBID OBESITY WITH BMI OF 45.0-49.9, ADULT (HCC): ICD-10-CM

## 2024-01-17 DIAGNOSIS — E11.42 DIABETIC POLYNEUROPATHY ASSOCIATED WITH TYPE 2 DIABETES MELLITUS (HCC): Chronic | ICD-10-CM

## 2024-01-17 DIAGNOSIS — I27.20 MODERATE PULMONARY HYPERTENSION (HCC): ICD-10-CM

## 2024-01-17 DIAGNOSIS — I27.20 PULMONARY HYPERTENSION (HCC): Chronic | ICD-10-CM

## 2024-01-17 DIAGNOSIS — E27.8 ADRENAL MASS (HCC): ICD-10-CM

## 2024-01-17 DIAGNOSIS — L97.919 ULCERS OF BOTH LOWER LEGS (HCC): ICD-10-CM

## 2024-01-17 DIAGNOSIS — I50.43 CHF (CONGESTIVE HEART FAILURE), NYHA CLASS I, ACUTE ON CHRONIC, COMBINED (HCC): ICD-10-CM

## 2024-01-17 DIAGNOSIS — I48.0 PAF (PAROXYSMAL ATRIAL FIBRILLATION) (HCC): Primary | Chronic | ICD-10-CM

## 2024-01-17 DIAGNOSIS — I82.5Z1 CHRONIC DEEP VEIN THROMBOSIS (DVT) OF DISTAL VEIN OF RIGHT LOWER EXTREMITY (HCC): ICD-10-CM

## 2024-01-17 DIAGNOSIS — J96.11 CHRONIC RESPIRATORY FAILURE WITH HYPOXIA (HCC): ICD-10-CM

## 2024-01-17 DIAGNOSIS — L97.929 ULCERS OF BOTH LOWER LEGS (HCC): ICD-10-CM

## 2024-01-17 DIAGNOSIS — J96.12 CHRONIC RESPIRATORY FAILURE WITH HYPERCAPNIA (HCC): ICD-10-CM

## 2024-01-17 PROBLEM — I48.91 ATRIAL FIBRILLATION (HCC): Status: RESOLVED | Noted: 2021-01-13 | Resolved: 2024-01-17

## 2024-01-17 PROCEDURE — 99309 SBSQ NF CARE MODERATE MDM 30: CPT | Performed by: FAMILY MEDICINE

## 2024-01-17 ASSESSMENT — ENCOUNTER SYMPTOMS
SINUS PRESSURE: 0
TROUBLE SWALLOWING: 0
SINUS PAIN: 0
EYE REDNESS: 0
SORE THROAT: 0
WHEEZING: 0
BLOOD IN STOOL: 0
SHORTNESS OF BREATH: 0
CONSTIPATION: 0
NAUSEA: 0
EYE ITCHING: 0
ABDOMINAL DISTENTION: 0
PHOTOPHOBIA: 0
ANAL BLEEDING: 0
VOICE CHANGE: 0
COLOR CHANGE: 0
STRIDOR: 0
RECTAL PAIN: 0
RHINORRHEA: 0
GASTROINTESTINAL NEGATIVE: 1
ALLERGIC/IMMUNOLOGIC NEGATIVE: 1
BACK PAIN: 1
DIARRHEA: 0
ABDOMINAL PAIN: 0
APNEA: 0
RESPIRATORY NEGATIVE: 1
CHEST TIGHTNESS: 0
CHOKING: 0
EYE PAIN: 0
FACIAL SWELLING: 0
VOMITING: 0
EYE DISCHARGE: 0
COUGH: 0

## 2024-01-17 NOTE — PROGRESS NOTES
SUBJECTIVE  Dara Betancur is a 68 y.o. female.  Seen: 1/11/2024    HPI/Chief C/O:  Admitted with acute on chronic CHF and respiratory failure   Pt denies any active complaint. Denies Nv, Bm changes.     Allergies   Allergen Reactions    Statins Myalgia and Rash   HPI    Dara Betancur    She is medically stable  There are no new issues per nursing .  Wound care assessment       ROS:  Review of Systems   Constitutional:  Positive for fatigue. Negative for activity change, appetite change, chills, diaphoresis, fever and unexpected weight change.   HENT: Negative.  Negative for congestion, dental problem, drooling, ear discharge, ear pain, facial swelling, hearing loss, mouth sores, nosebleeds, postnasal drip, rhinorrhea, sinus pressure, sinus pain, sneezing, sore throat, tinnitus, trouble swallowing and voice change.    Eyes:  Negative for photophobia, pain, discharge, redness, itching and visual disturbance.   Respiratory: Negative.  Negative for apnea, cough, choking, chest tightness, shortness of breath, wheezing and stridor.    Cardiovascular: Negative.  Negative for chest pain, palpitations and leg swelling.   Gastrointestinal: Negative.  Negative for abdominal distention, abdominal pain, anal bleeding, blood in stool, constipation, diarrhea, nausea, rectal pain and vomiting.   Endocrine: Negative.  Negative for cold intolerance, heat intolerance, polydipsia, polyphagia and polyuria.   Genitourinary: Negative.  Negative for decreased urine volume, difficulty urinating, dysuria, enuresis, flank pain, frequency, genital sores, hematuria and urgency.   Musculoskeletal:  Positive for arthralgias, back pain, gait problem and myalgias. Negative for joint swelling, neck pain and neck stiffness.   Skin:  Positive for wound (both lower legs). Negative for color change, pallor and rash.   Allergic/Immunologic: Negative.  Negative for environmental allergies, food allergies and immunocompromised state.   Neurological:

## 2024-01-22 ENCOUNTER — CLINICAL DOCUMENTATION (OUTPATIENT)
Dept: FAMILY MEDICINE CLINIC | Age: 69
End: 2024-01-22

## 2024-01-22 NOTE — PROGRESS NOTES
SUBJECTIVE  Dara Betancur is a 68 y.o. female.    Seen: 2024    HPI/Chief C/O:    Allergies   Allergen Reactions    Statins Myalgia and Rash   HPI    Dara Betancur    Patient reports burning with urination which started yesterday. Per nursing, patient has issue with hygiene and often won't shower daily, likely increasing risk of infection.       ROS:  As above.     Past Medical/Surgical Hx;        Diagnosis Date    (HFpEF) heart failure with preserved ejection fraction (HCC)     2018- echo- LVEF 55-65%    Anal fissure     Anemia 10/6/2017    Anxiety and depression     Arthritis     Asthma     Atrial fibrillation (HCC)     Eliquis    Blood transfusion     long time no reaction    CHF (congestive heart failure) (HCC)     Diastolic    COPD (chronic obstructive pulmonary disease) (HCC)     Disc disorder     DM2 (diabetes mellitus, type 2) (HCC)     on insulin    Fall due to stumbling 10/6/2017    GERD (gastroesophageal reflux disease)     History of blood transfusion     Hx of blood clots     Hyperlipidemia     Hypertension     Inappropriate sinus tachycardia     LVH (left ventricular hypertrophy)     moderate    Lymphadenitis, chronic 2018    Occipital neuralgia 2011    Respiratory acidosis 10/7/2017    Sinus tachycardia 2015     Past Surgical History:   Procedure Laterality Date    ANOSCOPY  10/25/2006    injection of anal sphincter with Botox, Franklyn Ortiz/Timmy, [facility]    ANOSCOPY  2007    injection of anal sphincter with Botox, Dr. Ortiz, [facility]    ANOSCOPY  2007    injection of anal sphincter with Botox, Franklyn Ortiz/Timmy, [facility]    ANUS SURGERY  2006    Lateral sphincterotomy for chronic anal fissure, Dr. Mello, [facility]    ANUS SURGERY  2012    anal exam and injection of Botox 100 units into anal sphincter, Laila Ortiz/Timmy, [facility]    APPENDECTOMY  1965     SECTION       SECTION       SECTION      COLONOSCOPY  2004    cecal polyp,

## 2024-01-23 LAB
BILIRUB UR QL STRIP: NEGATIVE
CLARITY UR: CLEAR
COLOR UR: YELLOW
GLUCOSE UR STRIP-MCNC: >=1000 MG/DL
HGB UR QL STRIP.AUTO: ABNORMAL
KETONES UR STRIP-MCNC: NEGATIVE MG/DL
LEUKOCYTE ESTERASE UR QL STRIP: NEGATIVE
NITRITE UR QL STRIP: NEGATIVE
PH UR STRIP: 6 [PH] (ref 5–9)
PROT UR STRIP-MCNC: NEGATIVE MG/DL
RBC #/AREA URNS HPF: ABNORMAL /HPF
SP GR UR STRIP: 1.01 (ref 1–1.03)
UROBILINOGEN UR STRIP-ACNC: 0.2 EU/DL (ref 0–1)
WBC #/AREA URNS HPF: ABNORMAL /HPF
YEAST URNS QL MICRO: PRESENT

## 2024-01-25 LAB
MICROORGANISM SPEC CULT: ABNORMAL
MICROORGANISM SPEC CULT: ABNORMAL
SERVICE CMNT-IMP: ABNORMAL
SPECIMEN DESCRIPTION: ABNORMAL

## 2024-02-14 ENCOUNTER — OUTSIDE SERVICES (OUTPATIENT)
Dept: FAMILY MEDICINE CLINIC | Age: 69
End: 2024-02-14
Payer: COMMERCIAL

## 2024-02-14 DIAGNOSIS — L03.115 BILATERAL CELLULITIS OF LOWER LEG: ICD-10-CM

## 2024-02-14 DIAGNOSIS — E27.8 ADRENAL MASS (HCC): ICD-10-CM

## 2024-02-14 DIAGNOSIS — F32.A ANXIETY AND DEPRESSION: Chronic | ICD-10-CM

## 2024-02-14 DIAGNOSIS — E66.01 MORBID OBESITY WITH BMI OF 45.0-49.9, ADULT (HCC): ICD-10-CM

## 2024-02-14 DIAGNOSIS — I27.20 MODERATE PULMONARY HYPERTENSION (HCC): ICD-10-CM

## 2024-02-14 DIAGNOSIS — L03.116 BILATERAL CELLULITIS OF LOWER LEG: ICD-10-CM

## 2024-02-14 DIAGNOSIS — L97.919 ULCERS OF BOTH LOWER LEGS (HCC): ICD-10-CM

## 2024-02-14 DIAGNOSIS — J96.12 CHRONIC RESPIRATORY FAILURE WITH HYPERCAPNIA (HCC): ICD-10-CM

## 2024-02-14 DIAGNOSIS — F41.9 ANXIETY AND DEPRESSION: Chronic | ICD-10-CM

## 2024-02-14 DIAGNOSIS — I50.43 CHF (CONGESTIVE HEART FAILURE), NYHA CLASS I, ACUTE ON CHRONIC, COMBINED (HCC): ICD-10-CM

## 2024-02-14 DIAGNOSIS — I10 ESSENTIAL HYPERTENSION: ICD-10-CM

## 2024-02-14 DIAGNOSIS — I48.0 PAF (PAROXYSMAL ATRIAL FIBRILLATION) (HCC): Primary | Chronic | ICD-10-CM

## 2024-02-14 DIAGNOSIS — I82.5Z1 CHRONIC DEEP VEIN THROMBOSIS (DVT) OF DISTAL VEIN OF RIGHT LOWER EXTREMITY (HCC): ICD-10-CM

## 2024-02-14 DIAGNOSIS — L97.929 ULCERS OF BOTH LOWER LEGS (HCC): ICD-10-CM

## 2024-02-14 DIAGNOSIS — E11.42 DIABETIC POLYNEUROPATHY ASSOCIATED WITH TYPE 2 DIABETES MELLITUS (HCC): Chronic | ICD-10-CM

## 2024-02-14 DIAGNOSIS — J96.11 CHRONIC RESPIRATORY FAILURE WITH HYPOXIA (HCC): ICD-10-CM

## 2024-02-14 PROCEDURE — 99309 SBSQ NF CARE MODERATE MDM 30: CPT | Performed by: FAMILY MEDICINE

## 2024-02-14 NOTE — PROGRESS NOTES
QD        Outpatient Encounter Medications as of 2/14/2024   Medication Sig Dispense Refill    melatonin 3 MG TABS tablet Take 1 tablet by mouth daily      insulin lispro (HUMALOG) 100 UNIT/ML injection vial Inject into the skin as needed for High Blood Sugar Sliding scale      bisacodyl (DULCOLAX) 10 MG suppository Place 1 suppository rectally daily as needed for Constipation      diclofenac sodium (VOLTAREN) 1 % GEL Apply topically 2 times daily      sodium phosphate (FLEET) 7-19 GM/118ML Place 1 enema rectally once as needed      magnesium hydroxide (MILK OF MAGNESIA) 400 MG/5ML suspension Take by mouth daily as needed for Constipation      metoprolol succinate (TOPROL XL) 100 MG extended release tablet Take 0.5 tablets by mouth 2 times daily (Patient taking differently: Take 0.5 tablets by mouth daily Pt restarted on 100 mg daily by Dr. Fuentes at Nursing facility.) 60 tablet 3    potassium chloride (KLOR-CON M) 20 MEQ extended release tablet Take 1 tablet by mouth 2 times daily (with meals) 60 tablet 3    ferrous sulfate (IRON 325) 325 (65 Fe) MG tablet Take 1 tablet by mouth daily (with breakfast)      insulin aspart (NOVOLOG FLEXPEN) 100 UNIT/ML injection pen Inject 6 Units into the skin 3 times daily (before meals)      empagliflozin (JARDIANCE) 10 MG tablet Take 1 tablet by mouth daily 30 tablet 5    bumetanide (BUMEX) 2 MG tablet Take 1 tablet by mouth daily 30 tablet 3    amiodarone (PACERONE) 100 MG tablet Take 1 tablet by mouth daily      pantoprazole (PROTONIX) 40 MG tablet Take 1 tablet by mouth daily      traZODone (DESYREL) 50 MG tablet Take 1 tablet by mouth nightly      senna (SENOKOT) 8.6 MG tablet Take 2 tablets by mouth daily      insulin glargine (LANTUS) 100 UNIT/ML injection vial Inject 30 Units into the skin 2 times daily *SEE OTHER ORDER* (Patient taking differently: Inject 42 Units into the skin 2 times daily *SEE OTHER ORDER*) 10 mL 3    DULoxetine (CYMBALTA) 60 MG extended release

## 2024-02-19 ENCOUNTER — HOSPITAL ENCOUNTER (INPATIENT)
Age: 69
LOS: 3 days | Discharge: HOME | DRG: 378 | End: 2024-02-22
Attending: EMERGENCY MEDICINE | Admitting: INTERNAL MEDICINE
Payer: MEDICARE

## 2024-02-19 ENCOUNTER — APPOINTMENT (OUTPATIENT)
Dept: CT IMAGING | Age: 69
DRG: 378 | End: 2024-02-19
Payer: MEDICARE

## 2024-02-19 DIAGNOSIS — K92.2 GASTROINTESTINAL HEMORRHAGE, UNSPECIFIED GASTROINTESTINAL HEMORRHAGE TYPE: Primary | ICD-10-CM

## 2024-02-19 DIAGNOSIS — Z79.01 CHRONIC ANTICOAGULATION: ICD-10-CM

## 2024-02-19 DIAGNOSIS — E27.8 ADRENAL MASS (HCC): ICD-10-CM

## 2024-02-19 LAB
ABO + RH BLD: NORMAL
ALBUMIN SERPL-MCNC: 4.1 G/DL (ref 3.5–5.2)
ALP SERPL-CCNC: 110 U/L (ref 35–104)
ALT SERPL-CCNC: 9 U/L (ref 0–32)
ANION GAP SERPL CALCULATED.3IONS-SCNC: 10 MMOL/L (ref 7–16)
ARM BAND NUMBER: NORMAL
AST SERPL-CCNC: 11 U/L (ref 0–31)
BASOPHILS # BLD: 0.03 K/UL (ref 0–0.2)
BASOPHILS NFR BLD: 1 % (ref 0–2)
BILIRUB SERPL-MCNC: 0.3 MG/DL (ref 0–1.2)
BLOOD BANK SAMPLE EXPIRATION: NORMAL
BLOOD GROUP ANTIBODIES SERPL: NEGATIVE
BUN SERPL-MCNC: 22 MG/DL (ref 6–23)
CALCIUM SERPL-MCNC: 9.1 MG/DL (ref 8.6–10.2)
CHLORIDE SERPL-SCNC: 94 MMOL/L (ref 98–107)
CHP ED QC CHECK: YES
CO2 SERPL-SCNC: 36 MMOL/L (ref 22–29)
CREAT SERPL-MCNC: 1.1 MG/DL (ref 0.5–1)
EOSINOPHIL # BLD: 0.2 K/UL (ref 0.05–0.5)
EOSINOPHILS RELATIVE PERCENT: 3 % (ref 0–6)
ERYTHROCYTE [DISTWIDTH] IN BLOOD BY AUTOMATED COUNT: 14.2 % (ref 11.5–15)
GFR SERPL CREATININE-BSD FRML MDRD: 57 ML/MIN/1.73M2
GLUCOSE BLD-MCNC: 154 MG/DL (ref 74–99)
GLUCOSE BLD-MCNC: 173 MG/DL (ref 74–99)
GLUCOSE SERPL-MCNC: 234 MG/DL (ref 74–99)
HCT VFR BLD AUTO: 41.7 % (ref 34–48)
HEMOCCULT STL QL: POSITIVE
HGB BLD-MCNC: 12.9 G/DL (ref 11.5–15.5)
IMM GRANULOCYTES # BLD AUTO: <0.03 K/UL (ref 0–0.58)
IMM GRANULOCYTES NFR BLD: 0 % (ref 0–5)
INR PPP: 1.6
LACTATE BLDV-SCNC: 1.3 MMOL/L (ref 0.5–2.2)
LYMPHOCYTES NFR BLD: 1.95 K/UL (ref 1.5–4)
LYMPHOCYTES RELATIVE PERCENT: 32 % (ref 20–42)
MCH RBC QN AUTO: 24.5 PG (ref 26–35)
MCHC RBC AUTO-ENTMCNC: 30.9 G/DL (ref 32–34.5)
MCV RBC AUTO: 79.1 FL (ref 80–99.9)
MONOCYTES NFR BLD: 0.38 K/UL (ref 0.1–0.95)
MONOCYTES NFR BLD: 6 % (ref 2–12)
NEUTROPHILS NFR BLD: 58 % (ref 43–80)
NEUTS SEG NFR BLD: 3.56 K/UL (ref 1.8–7.3)
PLATELET # BLD AUTO: 192 K/UL (ref 130–450)
PMV BLD AUTO: 9.7 FL (ref 7–12)
POTASSIUM SERPL-SCNC: 3.6 MMOL/L (ref 3.5–5)
PROT SERPL-MCNC: 6.8 G/DL (ref 6.4–8.3)
PROTHROMBIN TIME: 17.8 SEC (ref 9.3–12.4)
RBC # BLD AUTO: 5.27 M/UL (ref 3.5–5.5)
SODIUM SERPL-SCNC: 140 MMOL/L (ref 132–146)
WBC OTHER # BLD: 6.1 K/UL (ref 4.5–11.5)

## 2024-02-19 PROCEDURE — 94664 DEMO&/EVAL PT USE INHALER: CPT

## 2024-02-19 PROCEDURE — 2060000000 HC ICU INTERMEDIATE R&B

## 2024-02-19 PROCEDURE — 6360000002 HC RX W HCPCS: Performed by: FAMILY MEDICINE

## 2024-02-19 PROCEDURE — 86850 RBC ANTIBODY SCREEN: CPT

## 2024-02-19 PROCEDURE — 85025 COMPLETE CBC W/AUTO DIFF WBC: CPT

## 2024-02-19 PROCEDURE — 99285 EMERGENCY DEPT VISIT HI MDM: CPT

## 2024-02-19 PROCEDURE — C9113 INJ PANTOPRAZOLE SODIUM, VIA: HCPCS | Performed by: EMERGENCY MEDICINE

## 2024-02-19 PROCEDURE — 83605 ASSAY OF LACTIC ACID: CPT

## 2024-02-19 PROCEDURE — 2580000003 HC RX 258: Performed by: FAMILY MEDICINE

## 2024-02-19 PROCEDURE — 86900 BLOOD TYPING SEROLOGIC ABO: CPT

## 2024-02-19 PROCEDURE — 6360000002 HC RX W HCPCS: Performed by: EMERGENCY MEDICINE

## 2024-02-19 PROCEDURE — 6360000004 HC RX CONTRAST MEDICATION: Performed by: RADIOLOGY

## 2024-02-19 PROCEDURE — 6370000000 HC RX 637 (ALT 250 FOR IP): Performed by: FAMILY MEDICINE

## 2024-02-19 PROCEDURE — 82962 GLUCOSE BLOOD TEST: CPT

## 2024-02-19 PROCEDURE — 86901 BLOOD TYPING SEROLOGIC RH(D): CPT

## 2024-02-19 PROCEDURE — 74177 CT ABD & PELVIS W/CONTRAST: CPT

## 2024-02-19 PROCEDURE — 85610 PROTHROMBIN TIME: CPT

## 2024-02-19 PROCEDURE — 80053 COMPREHEN METABOLIC PANEL: CPT

## 2024-02-19 RX ORDER — ONDANSETRON 4 MG/1
4 TABLET, ORALLY DISINTEGRATING ORAL EVERY 8 HOURS PRN
Status: DISCONTINUED | OUTPATIENT
Start: 2024-02-19 | End: 2024-02-22 | Stop reason: HOSPADM

## 2024-02-19 RX ORDER — METOPROLOL SUCCINATE 50 MG/1
50 TABLET, EXTENDED RELEASE ORAL DAILY
Status: DISCONTINUED | OUTPATIENT
Start: 2024-02-19 | End: 2024-02-22 | Stop reason: HOSPADM

## 2024-02-19 RX ORDER — TRAZODONE HYDROCHLORIDE 50 MG/1
50 TABLET ORAL NIGHTLY
Status: DISCONTINUED | OUTPATIENT
Start: 2024-02-19 | End: 2024-02-22 | Stop reason: HOSPADM

## 2024-02-19 RX ORDER — INSULIN LISPRO 100 [IU]/ML
0-4 INJECTION, SOLUTION INTRAVENOUS; SUBCUTANEOUS
Status: DISCONTINUED | OUTPATIENT
Start: 2024-02-19 | End: 2024-02-20

## 2024-02-19 RX ORDER — AMIODARONE HYDROCHLORIDE 200 MG/1
100 TABLET ORAL DAILY
Status: DISCONTINUED | OUTPATIENT
Start: 2024-02-19 | End: 2024-02-22 | Stop reason: HOSPADM

## 2024-02-19 RX ORDER — HYDROXYZINE PAMOATE 25 MG/1
50 CAPSULE ORAL 3 TIMES DAILY
Status: DISCONTINUED | OUTPATIENT
Start: 2024-02-19 | End: 2024-02-22 | Stop reason: HOSPADM

## 2024-02-19 RX ORDER — SODIUM CHLORIDE 0.9 % (FLUSH) 0.9 %
5-40 SYRINGE (ML) INJECTION EVERY 12 HOURS SCHEDULED
Status: DISCONTINUED | OUTPATIENT
Start: 2024-02-19 | End: 2024-02-22 | Stop reason: HOSPADM

## 2024-02-19 RX ORDER — GABAPENTIN 100 MG/1
200 CAPSULE ORAL 3 TIMES DAILY
Status: DISCONTINUED | OUTPATIENT
Start: 2024-02-19 | End: 2024-02-22 | Stop reason: HOSPADM

## 2024-02-19 RX ORDER — ONDANSETRON 2 MG/ML
4 INJECTION INTRAMUSCULAR; INTRAVENOUS EVERY 6 HOURS PRN
Status: DISCONTINUED | OUTPATIENT
Start: 2024-02-19 | End: 2024-02-22 | Stop reason: HOSPADM

## 2024-02-19 RX ORDER — SODIUM CHLORIDE 9 MG/ML
INJECTION, SOLUTION INTRAVENOUS CONTINUOUS
Status: DISCONTINUED | OUTPATIENT
Start: 2024-02-19 | End: 2024-02-20

## 2024-02-19 RX ORDER — LANOLIN ALCOHOL/MO/W.PET/CERES
3 CREAM (GRAM) TOPICAL DAILY
Status: DISCONTINUED | OUTPATIENT
Start: 2024-02-19 | End: 2024-02-22 | Stop reason: HOSPADM

## 2024-02-19 RX ORDER — DULOXETIN HYDROCHLORIDE 60 MG/1
60 CAPSULE, DELAYED RELEASE ORAL NIGHTLY
Status: DISCONTINUED | OUTPATIENT
Start: 2024-02-19 | End: 2024-02-22 | Stop reason: HOSPADM

## 2024-02-19 RX ORDER — ACETAMINOPHEN 650 MG/1
650 SUPPOSITORY RECTAL EVERY 6 HOURS PRN
Status: DISCONTINUED | OUTPATIENT
Start: 2024-02-19 | End: 2024-02-22 | Stop reason: HOSPADM

## 2024-02-19 RX ORDER — ACETAMINOPHEN 325 MG/1
650 TABLET ORAL EVERY 6 HOURS PRN
Status: DISCONTINUED | OUTPATIENT
Start: 2024-02-19 | End: 2024-02-22 | Stop reason: HOSPADM

## 2024-02-19 RX ORDER — BUMETANIDE 1 MG/1
2 TABLET ORAL DAILY
Status: DISCONTINUED | OUTPATIENT
Start: 2024-02-19 | End: 2024-02-22 | Stop reason: HOSPADM

## 2024-02-19 RX ORDER — SODIUM CHLORIDE 0.9 % (FLUSH) 0.9 %
5-40 SYRINGE (ML) INJECTION PRN
Status: DISCONTINUED | OUTPATIENT
Start: 2024-02-19 | End: 2024-02-22 | Stop reason: HOSPADM

## 2024-02-19 RX ORDER — ACETAMINOPHEN 500 MG
1000 TABLET ORAL EVERY 6 HOURS PRN
Status: DISCONTINUED | OUTPATIENT
Start: 2024-02-19 | End: 2024-02-22 | Stop reason: HOSPADM

## 2024-02-19 RX ORDER — SODIUM CHLORIDE 9 MG/ML
INJECTION, SOLUTION INTRAVENOUS PRN
Status: DISCONTINUED | OUTPATIENT
Start: 2024-02-19 | End: 2024-02-22 | Stop reason: HOSPADM

## 2024-02-19 RX ORDER — INSULIN GLARGINE 100 [IU]/ML
42 INJECTION, SOLUTION SUBCUTANEOUS 2 TIMES DAILY
Status: DISCONTINUED | OUTPATIENT
Start: 2024-02-19 | End: 2024-02-20

## 2024-02-19 RX ORDER — PANTOPRAZOLE SODIUM 40 MG/10ML
40 INJECTION, POWDER, LYOPHILIZED, FOR SOLUTION INTRAVENOUS ONCE
Status: COMPLETED | OUTPATIENT
Start: 2024-02-19 | End: 2024-02-19

## 2024-02-19 RX ORDER — FERROUS SULFATE 325(65) MG
325 TABLET ORAL
Status: DISCONTINUED | OUTPATIENT
Start: 2024-02-20 | End: 2024-02-22 | Stop reason: HOSPADM

## 2024-02-19 RX ORDER — HYDROXYZINE HYDROCHLORIDE 10 MG/1
50 TABLET, FILM COATED ORAL 3 TIMES DAILY
Status: DISCONTINUED | OUTPATIENT
Start: 2024-02-19 | End: 2024-02-19 | Stop reason: CLARIF

## 2024-02-19 RX ORDER — PANTOPRAZOLE SODIUM 40 MG/1
40 TABLET, DELAYED RELEASE ORAL DAILY
Status: DISCONTINUED | OUTPATIENT
Start: 2024-02-19 | End: 2024-02-19 | Stop reason: SDUPTHER

## 2024-02-19 RX ORDER — EZETIMIBE 10 MG/1
10 TABLET ORAL NIGHTLY
Status: DISCONTINUED | OUTPATIENT
Start: 2024-02-19 | End: 2024-02-22 | Stop reason: HOSPADM

## 2024-02-19 RX ORDER — INSULIN LISPRO 100 [IU]/ML
0-4 INJECTION, SOLUTION INTRAVENOUS; SUBCUTANEOUS NIGHTLY
Status: DISCONTINUED | OUTPATIENT
Start: 2024-02-19 | End: 2024-02-20

## 2024-02-19 RX ADMIN — BUMETANIDE 2 MG: 1 TABLET ORAL at 18:54

## 2024-02-19 RX ADMIN — DULOXETINE HYDROCHLORIDE 60 MG: 60 CAPSULE, DELAYED RELEASE ORAL at 23:24

## 2024-02-19 RX ADMIN — IOPAMIDOL 75 ML: 755 INJECTION, SOLUTION INTRAVENOUS at 14:26

## 2024-02-19 RX ADMIN — Medication 3 MG: at 23:24

## 2024-02-19 RX ADMIN — INSULIN GLARGINE 42 UNITS: 100 INJECTION, SOLUTION SUBCUTANEOUS at 23:24

## 2024-02-19 RX ADMIN — METOPROLOL SUCCINATE 50 MG: 50 TABLET, EXTENDED RELEASE ORAL at 18:55

## 2024-02-19 RX ADMIN — AMIODARONE HYDROCHLORIDE 100 MG: 200 TABLET ORAL at 18:53

## 2024-02-19 RX ADMIN — TRAZODONE HYDROCHLORIDE 50 MG: 50 TABLET ORAL at 23:23

## 2024-02-19 RX ADMIN — SODIUM CHLORIDE: 9 INJECTION, SOLUTION INTRAVENOUS at 18:35

## 2024-02-19 RX ADMIN — PANTOPRAZOLE SODIUM 40 MG: 40 INJECTION, POWDER, FOR SOLUTION INTRAVENOUS at 17:37

## 2024-02-19 RX ADMIN — ACETAMINOPHEN 1000 MG: 500 TABLET ORAL at 20:38

## 2024-02-19 RX ADMIN — IPRATROPIUM BROMIDE 0.5 MG: 0.5 SOLUTION RESPIRATORY (INHALATION) at 20:33

## 2024-02-19 RX ADMIN — HYDROXYZINE PAMOATE 50 MG: 25 CAPSULE ORAL at 23:24

## 2024-02-19 RX ADMIN — GABAPENTIN 200 MG: 100 CAPSULE ORAL at 23:23

## 2024-02-19 RX ADMIN — GABAPENTIN 200 MG: 100 CAPSULE ORAL at 18:53

## 2024-02-19 RX ADMIN — HYDROXYZINE PAMOATE 50 MG: 25 CAPSULE ORAL at 18:55

## 2024-02-19 ASSESSMENT — ENCOUNTER SYMPTOMS: ANAL BLEEDING: 1

## 2024-02-19 ASSESSMENT — PAIN - FUNCTIONAL ASSESSMENT: PAIN_FUNCTIONAL_ASSESSMENT: NONE - DENIES PAIN

## 2024-02-19 ASSESSMENT — PAIN SCALES - GENERAL
PAINLEVEL_OUTOF10: 7
PAINLEVEL_OUTOF10: 7

## 2024-02-19 ASSESSMENT — LIFESTYLE VARIABLES
HOW MANY STANDARD DRINKS CONTAINING ALCOHOL DO YOU HAVE ON A TYPICAL DAY: PATIENT DOES NOT DRINK
HOW OFTEN DO YOU HAVE A DRINK CONTAINING ALCOHOL: NEVER

## 2024-02-19 NOTE — ED PROVIDER NOTES
congestive heart failure (HCC) 01/15/2018    Asthma exacerbation with COPD (chronic obstructive pulmonary disease) ([facility]) 04/12/2018    Chest pain     Acute congestive heart failure ([facility])     Hyperkalemia 05/26/2018    Palpitations 11/06/2018    Occult blood in stools 11/06/2018    COPD exacerbation ([facility]) 11/06/2018    Acute on chronic respiratory failure with hypoxia ([facility]) 11/06/2018    Sepsis ([facility]) 11/27/2018    Poorly controlled diabetes mellitus ([facility])     Septicemia ([facility])     Acute diastolic congestive heart failure ([facility]) 12/15/2018    Acute hypoxemic respiratory failure ([facility])     Diastolic CHF, acute on chronic ([facility])     Gastroesophageal reflux disease 03/25/2020    Moderate asthma with exacerbation 01/23/2020    Atrial fibrillation ([facility]) 01/13/2021    UTI (urinary tract infection) 04/29/2021    Sepsis ([facility]) 03/08/2022    Acute and chronic respiratory failure with hypercapnia ([facility]) 10/04/2022    DM (diabetes mellitus) ([facility]) 10/04/2022    Acute on chronic congestive heart failure ([facility]) 04/03/2023    Acute on chronic congestive heart failure, unspecified heart failure type ([facility]) 04/03/2023     Past Medical History:   Diagnosis Date    (HFpEF) heart failure with preserved ejection fraction ([facility])     Arthritis     Asthma     Blood transfusion     CHF (congestive heart failure) ([facility])     COPD (chronic obstructive pulmonary disease) ([facility])     Disc disorder     DM2 (diabetes mellitus, type 2) ([facility])     GERD (gastroesophageal reflux disease)     History of blood transfusion     Hx of blood clots     Hypertension     Inappropriate sinus tachycardia     Lymphadenitis, chronic 4/13/2018         CONSULTS: (Who and What was discussed)  IP CONSULT TO GI  IP CONSULT TO INTERNAL MEDICINE  IP CONSULT TO GENERAL SURGERY    Discussion with Other Profesionals : Admitting Team dr liliana paris for dr stone    Social Determinants : None    Records Reviewed : Outpatient Notes      CC/HPI Summary, DDx, ED Course, and Reassessment:

## 2024-02-20 ENCOUNTER — APPOINTMENT (OUTPATIENT)
Dept: CT IMAGING | Age: 69
DRG: 378 | End: 2024-02-20
Payer: MEDICARE

## 2024-02-20 ENCOUNTER — CLINICAL DOCUMENTATION (OUTPATIENT)
Dept: FAMILY MEDICINE CLINIC | Age: 69
End: 2024-02-20

## 2024-02-20 LAB
ALBUMIN SERPL-MCNC: 3.4 G/DL (ref 3.5–5.2)
ALP SERPL-CCNC: 89 U/L (ref 35–104)
ALT SERPL-CCNC: 8 U/L (ref 0–32)
ANION GAP SERPL CALCULATED.3IONS-SCNC: 7 MMOL/L (ref 7–16)
AST SERPL-CCNC: 10 U/L (ref 0–31)
BILIRUB SERPL-MCNC: 0.2 MG/DL (ref 0–1.2)
BUN SERPL-MCNC: 23 MG/DL (ref 6–23)
CALCIUM SERPL-MCNC: 9 MG/DL (ref 8.6–10.2)
CHLORIDE SERPL-SCNC: 99 MMOL/L (ref 98–107)
CO2 SERPL-SCNC: 36 MMOL/L (ref 22–29)
CREAT SERPL-MCNC: 1.2 MG/DL (ref 0.5–1)
GFR SERPL CREATININE-BSD FRML MDRD: 50 ML/MIN/1.73M2
GLUCOSE BLD-MCNC: 119 MG/DL (ref 74–99)
GLUCOSE BLD-MCNC: 128 MG/DL (ref 74–99)
GLUCOSE BLD-MCNC: 176 MG/DL (ref 74–99)
GLUCOSE BLD-MCNC: 275 MG/DL (ref 74–99)
GLUCOSE SERPL-MCNC: 107 MG/DL (ref 74–99)
HCT VFR BLD AUTO: 38.5 % (ref 34–48)
HCT VFR BLD AUTO: 43.3 % (ref 34–48)
HGB BLD-MCNC: 11.9 G/DL (ref 11.5–15.5)
HGB BLD-MCNC: 13 G/DL (ref 11.5–15.5)
INR PPP: 1.3
IRON SATN MFR SERPL: 22 % (ref 15–50)
IRON SERPL-MCNC: 38 UG/DL (ref 37–145)
LDH SERPL-CCNC: 278 U/L (ref 135–214)
MAGNESIUM SERPL-MCNC: 2 MG/DL (ref 1.6–2.6)
PARTIAL THROMBOPLASTIN TIME: 34.6 SEC (ref 24.5–35.1)
POTASSIUM SERPL-SCNC: 3.2 MMOL/L (ref 3.5–5)
PROT SERPL-MCNC: 5.7 G/DL (ref 6.4–8.3)
PROTHROMBIN TIME: 14.5 SEC (ref 9.3–12.4)
SODIUM SERPL-SCNC: 142 MMOL/L (ref 132–146)
TIBC SERPL-MCNC: 175 UG/DL (ref 250–450)

## 2024-02-20 PROCEDURE — 6360000004 HC RX CONTRAST MEDICATION: Performed by: RADIOLOGY

## 2024-02-20 PROCEDURE — 83550 IRON BINDING TEST: CPT

## 2024-02-20 PROCEDURE — 6370000000 HC RX 637 (ALT 250 FOR IP): Performed by: FAMILY MEDICINE

## 2024-02-20 PROCEDURE — 2700000000 HC OXYGEN THERAPY PER DAY

## 2024-02-20 PROCEDURE — 6360000002 HC RX W HCPCS

## 2024-02-20 PROCEDURE — 71260 CT THORAX DX C+: CPT

## 2024-02-20 PROCEDURE — 2500000003 HC RX 250 WO HCPCS: Performed by: FAMILY MEDICINE

## 2024-02-20 PROCEDURE — 83615 LACTATE (LD) (LDH) ENZYME: CPT

## 2024-02-20 PROCEDURE — 82962 GLUCOSE BLOOD TEST: CPT

## 2024-02-20 PROCEDURE — 2060000000 HC ICU INTERMEDIATE R&B

## 2024-02-20 PROCEDURE — 5A09457 ASSISTANCE WITH RESPIRATORY VENTILATION, 24-96 CONSECUTIVE HOURS, CONTINUOUS POSITIVE AIRWAY PRESSURE: ICD-10-PCS | Performed by: INTERNAL MEDICINE

## 2024-02-20 PROCEDURE — 6360000002 HC RX W HCPCS: Performed by: FAMILY MEDICINE

## 2024-02-20 PROCEDURE — 85014 HEMATOCRIT: CPT

## 2024-02-20 PROCEDURE — 85610 PROTHROMBIN TIME: CPT

## 2024-02-20 PROCEDURE — 94640 AIRWAY INHALATION TREATMENT: CPT

## 2024-02-20 PROCEDURE — 85018 HEMOGLOBIN: CPT

## 2024-02-20 PROCEDURE — 97165 OT EVAL LOW COMPLEX 30 MIN: CPT

## 2024-02-20 PROCEDURE — 83540 ASSAY OF IRON: CPT

## 2024-02-20 PROCEDURE — 80053 COMPREHEN METABOLIC PANEL: CPT

## 2024-02-20 PROCEDURE — 99222 1ST HOSP IP/OBS MODERATE 55: CPT | Performed by: INTERNAL MEDICINE

## 2024-02-20 PROCEDURE — 6360000002 HC RX W HCPCS: Performed by: INTERNAL MEDICINE

## 2024-02-20 PROCEDURE — 83735 ASSAY OF MAGNESIUM: CPT

## 2024-02-20 PROCEDURE — A4216 STERILE WATER/SALINE, 10 ML: HCPCS | Performed by: FAMILY MEDICINE

## 2024-02-20 PROCEDURE — 36415 COLL VENOUS BLD VENIPUNCTURE: CPT

## 2024-02-20 PROCEDURE — C9113 INJ PANTOPRAZOLE SODIUM, VIA: HCPCS | Performed by: FAMILY MEDICINE

## 2024-02-20 PROCEDURE — 6370000000 HC RX 637 (ALT 250 FOR IP): Performed by: INTERNAL MEDICINE

## 2024-02-20 PROCEDURE — 85730 THROMBOPLASTIN TIME PARTIAL: CPT

## 2024-02-20 PROCEDURE — 97161 PT EVAL LOW COMPLEX 20 MIN: CPT

## 2024-02-20 PROCEDURE — 2580000003 HC RX 258: Performed by: FAMILY MEDICINE

## 2024-02-20 PROCEDURE — 94660 CPAP INITIATION&MGMT: CPT

## 2024-02-20 RX ORDER — INSULIN LISPRO 100 [IU]/ML
0-4 INJECTION, SOLUTION INTRAVENOUS; SUBCUTANEOUS NIGHTLY
Status: DISCONTINUED | OUTPATIENT
Start: 2024-02-20 | End: 2024-02-22 | Stop reason: HOSPADM

## 2024-02-20 RX ORDER — FEXOFENADINE HCL 180 MG/1
180 TABLET ORAL DAILY
COMMUNITY

## 2024-02-20 RX ORDER — CALCIUM CARBONATE 500 MG/1
1 TABLET, CHEWABLE ORAL EVERY 4 HOURS PRN
COMMUNITY

## 2024-02-20 RX ORDER — INSULIN GLARGINE 100 [IU]/ML
20 INJECTION, SOLUTION SUBCUTANEOUS NIGHTLY
Status: DISCONTINUED | OUTPATIENT
Start: 2024-02-20 | End: 2024-02-22 | Stop reason: HOSPADM

## 2024-02-20 RX ORDER — DEXAMETHASONE 1 MG
1 TABLET ORAL ONCE
Status: COMPLETED | OUTPATIENT
Start: 2024-02-20 | End: 2024-02-20

## 2024-02-20 RX ORDER — POTASSIUM CHLORIDE 20 MEQ/1
40 TABLET, EXTENDED RELEASE ORAL 2 TIMES DAILY WITH MEALS
Status: DISCONTINUED | OUTPATIENT
Start: 2024-02-20 | End: 2024-02-22 | Stop reason: HOSPADM

## 2024-02-20 RX ORDER — INSULIN LISPRO 100 [IU]/ML
0-16 INJECTION, SOLUTION INTRAVENOUS; SUBCUTANEOUS
Status: DISCONTINUED | OUTPATIENT
Start: 2024-02-21 | End: 2024-02-22

## 2024-02-20 RX ORDER — INSULIN GLARGINE 100 [IU]/ML
40 INJECTION, SOLUTION SUBCUTANEOUS EVERY MORNING
Status: DISCONTINUED | OUTPATIENT
Start: 2024-02-21 | End: 2024-02-22 | Stop reason: HOSPADM

## 2024-02-20 RX ORDER — KETOROLAC TROMETHAMINE 30 MG/ML
15 INJECTION, SOLUTION INTRAMUSCULAR; INTRAVENOUS ONCE
Status: COMPLETED | OUTPATIENT
Start: 2024-02-20 | End: 2024-02-20

## 2024-02-20 RX ADMIN — IOPAMIDOL 75 ML: 755 INJECTION, SOLUTION INTRAVENOUS at 16:57

## 2024-02-20 RX ADMIN — POTASSIUM CHLORIDE 40 MEQ: 1500 TABLET, EXTENDED RELEASE ORAL at 15:37

## 2024-02-20 RX ADMIN — HYDROXYZINE PAMOATE 50 MG: 25 CAPSULE ORAL at 13:26

## 2024-02-20 RX ADMIN — TRAZODONE HYDROCHLORIDE 50 MG: 50 TABLET ORAL at 21:11

## 2024-02-20 RX ADMIN — GABAPENTIN 200 MG: 100 CAPSULE ORAL at 13:25

## 2024-02-20 RX ADMIN — DULOXETINE HYDROCHLORIDE 60 MG: 60 CAPSULE, DELAYED RELEASE ORAL at 21:11

## 2024-02-20 RX ADMIN — SODIUM CHLORIDE, PRESERVATIVE FREE 10 ML: 5 INJECTION INTRAVENOUS at 10:37

## 2024-02-20 RX ADMIN — IPRATROPIUM BROMIDE 0.5 MG: 0.5 SOLUTION RESPIRATORY (INHALATION) at 08:30

## 2024-02-20 RX ADMIN — INSULIN GLARGINE 20 UNITS: 100 INJECTION, SOLUTION SUBCUTANEOUS at 21:10

## 2024-02-20 RX ADMIN — AMIODARONE HYDROCHLORIDE 100 MG: 200 TABLET ORAL at 08:07

## 2024-02-20 RX ADMIN — IPRATROPIUM BROMIDE 0.5 MG: 0.5 SOLUTION RESPIRATORY (INHALATION) at 12:52

## 2024-02-20 RX ADMIN — IPRATROPIUM BROMIDE 0.5 MG: 0.5 SOLUTION RESPIRATORY (INHALATION) at 20:07

## 2024-02-20 RX ADMIN — FERROUS SULFATE TAB 325 MG (65 MG ELEMENTAL FE) 325 MG: 325 (65 FE) TAB at 08:07

## 2024-02-20 RX ADMIN — SODIUM CHLORIDE, PRESERVATIVE FREE 10 ML: 5 INJECTION INTRAVENOUS at 21:12

## 2024-02-20 RX ADMIN — Medication 3 MG: at 21:11

## 2024-02-20 RX ADMIN — EZETIMIBE 10 MG: 10 TABLET ORAL at 23:08

## 2024-02-20 RX ADMIN — DEXAMETHASONE 1 MG: 1 TABLET ORAL at 23:08

## 2024-02-20 RX ADMIN — GABAPENTIN 200 MG: 100 CAPSULE ORAL at 08:07

## 2024-02-20 RX ADMIN — INSULIN GLARGINE 42 UNITS: 100 INJECTION, SOLUTION SUBCUTANEOUS at 08:28

## 2024-02-20 RX ADMIN — SODIUM CHLORIDE, PRESERVATIVE FREE 40 MG: 5 INJECTION INTRAVENOUS at 15:37

## 2024-02-20 RX ADMIN — GABAPENTIN 200 MG: 100 CAPSULE ORAL at 21:11

## 2024-02-20 RX ADMIN — KETOROLAC TROMETHAMINE 15 MG: 30 INJECTION, SOLUTION INTRAMUSCULAR; INTRAVENOUS at 00:56

## 2024-02-20 RX ADMIN — SODIUM CHLORIDE, PRESERVATIVE FREE 10 ML: 5 INJECTION INTRAVENOUS at 00:57

## 2024-02-20 RX ADMIN — HYDROXYZINE PAMOATE 50 MG: 25 CAPSULE ORAL at 21:11

## 2024-02-20 RX ADMIN — HYDROXYZINE PAMOATE 50 MG: 25 CAPSULE ORAL at 08:07

## 2024-02-20 RX ADMIN — METOPROLOL SUCCINATE 50 MG: 50 TABLET, EXTENDED RELEASE ORAL at 08:07

## 2024-02-20 RX ADMIN — MICONAZOLE NITRATE: 20 POWDER TOPICAL at 21:12

## 2024-02-20 ASSESSMENT — PAIN SCALES - GENERAL
PAINLEVEL_OUTOF10: 7
PAINLEVEL_OUTOF10: 7
PAINLEVEL_OUTOF10: 0
PAINLEVEL_OUTOF10: 0

## 2024-02-20 ASSESSMENT — PAIN DESCRIPTION - LOCATION: LOCATION: LEG;FOOT

## 2024-02-20 ASSESSMENT — ENCOUNTER SYMPTOMS
ABDOMINAL PAIN: 0
CHEST TIGHTNESS: 0
DIARRHEA: 0
COUGH: 0
VOMITING: 0
ANAL BLEEDING: 1
NAUSEA: 0
SHORTNESS OF BREATH: 0

## 2024-02-20 NOTE — ED NOTES
ED to Inpatient Handoff Report    Notified [facility] that electronic handoff available and patient ready for transport to room 431.    Safety Risks: Risk of falls    Patient in Restraints: no    Constant Observer or Patient : no    Telemetry Monitoring Ordered: No          Order to transfer to unit without monitor: NA    Last MEWS: 1 Time completed: 2321    Deterioration Index: 23.16    Vitals:    02/19/24 1614 02/19/24 1854 02/19/24 1859 02/19/24 2321   BP:  (!) 116/58  (!) 117/57   Pulse:  70  65   Resp:  18  16   Temp:    97.6 °F (36.4 °C)   TempSrc:    Oral   SpO2:  92% 95% 91%   Height: 1.626 m (5' 4\")          Opportunity for questions and clarification was provided.

## 2024-02-20 NOTE — ACP (ADVANCE CARE PLANNING)
Advance Care Planning   Healthcare Decision Maker:    Primary Decision Maker: Nicolas Betancur [place] - [phone]    Primary Decision Maker: Myrna Betancur - Child - [phone]    Secondary Decision Maker (Active): Thee Betancur - Trista - [phone]    Click here to complete Healthcare Decision Makers including selection of the Healthcare Decision Maker Relationship (ie \"Primary\").

## 2024-02-20 NOTE — H&P
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    PT/OT  DVT/PE prophylaxis  Social work for d/c planning  Code Full        Please note that over 50 minutes was spent in evaluating the patient, review of records and results, discussion with staff/family, etc.    Tami Wallace,    10:33 AM  2/20/2024

## 2024-02-20 NOTE — DISCHARGE INSTR - COC
Continuity of Care Form    Patient Name: Dara Betancur   :  1955  MRN:  [id number]    Admit date:  2024  Discharge date:      Code Status Order: Full Code   Advance Directives:     Admitting Physician:  Tami Wallace DO  PCP: Carrillo Correa DO    Discharging Nurse: jeanne barriga RN  Discharging Hospital Unit/Room#: 0431/0431-A  Discharging Unit Phone Number: [phone]    Emergency Contact:   Extended Emergency Contact Information  Primary Emergency Contact: PipeNicolas           [place], [facility]  Home Phone: [phone]  Mobile Phone: [phone]  Relation: Child   needed? No  Secondary Emergency Contact: Gem Betancur  Home Phone: [phone]  Mobile Phone: [phone]  Relation: Other  Preferred language: English   needed? No    Past Surgical History:  Past Surgical History:   Procedure Laterality Date    ANOSCOPY  10/25/2006    injection of anal sphincter with Botox, Franklyn Farley, [facility]    ANOSCOPY  2007    injection of anal sphincter with Botox, Dr. Ortiz, [facility]    ANOSCOPY  2007    injection of anal sphincter with Botox, Franklyn Farley, [facility]    ANUS SURGERY  2006    Lateral sphincterotomy for chronic anal fissure, Dr. Mello, [facility]    ANUS SURGERY  2012    anal exam and injection of Botox 100 units into anal sphincter, Laila Farley, [facility]    APPENDECTOMY  1965     SECTION  1976     SECTION       SECTION      COLONOSCOPY  2004    cecal polyp, bx (no pathologic changes), Dr. Roman, [facility]    COLONOSCOPY  2006    snare polypectomy terminal ileum polyp (granulation tissue polyp) and anal polyp (inflammatory/papilla), Dr. Ortiz, [facility]    COLONOSCOPY  3/23/2012    bx/cauterization proximal ascending colon polyp, injection of anal sphincter with Botox, Dr. Ortiz, [facility]    ENDOSCOPY, COLON, DIAGNOSTIC      FRACTURE SURGERY      R leg fracture with surgery for

## 2024-02-20 NOTE — PROGRESS NOTES
Take 2 tablets by mouth every 6 hours as needed for Pain Indications: -NTE: 3G/24HRS-      apixaban (ELIQUIS) 5 MG TABS tablet Take 1 tablet by mouth 2 times daily      gabapentin (NEURONTIN) 100 MG capsule Take 2 capsules by mouth 3 times daily.         Reviewed recent labs related to Dara's current problems      Discussed importance of regular Health Maintenance follow up  Health Maintenance   Topic    Depression Monitoring     DTaP/Tdap/Td vaccine (1 - Tdap)    DEXA (modify frequency per FRAX score)     Respiratory Syncytial Virus (RSV) Pregnant or age 60 yrs+ (1 - 1-dose 60+ series)    Shingles vaccine (2 of 3)    Colorectal Cancer Screen     Pneumococcal 65+ years Vaccine (2 - PCV)    Diabetic retinal exam     Breast cancer screen     Diabetic foot exam     Diabetic Alb to Cr ratio (uACR) test     Flu vaccine (1)    COVID-19 Vaccine (3 - 2023-24 season)    Annual Wellness Visit (Medicare Advantage)     A1C test (Diabetic or Prediabetic)     Lipids     GFR test (Diabetes, CKD 3-4, OR last GFR 15-59)     Hepatitis C screen     Hepatitis A vaccine     Hepatitis B vaccine     Hib vaccine     Polio vaccine     Meningococcal (ACWY) vaccine          Bowen Powell MD    2/20/2024 1:21 PM

## 2024-02-20 NOTE — PLAN OF CARE
Problem: ABCDS Injury Assessment  Goal: Absence of physical injury  Outcome: Progressing     Problem: Skin/Tissue Integrity  Goal: Absence of new skin breakdown  Description: 1.  Monitor for areas of redness and/or skin breakdown  2.  Assess vascular access sites hourly  3.  Every 4-6 hours minimum:  Change oxygen saturation probe site  4.  Every 4-6 hours:  If on nasal continuous positive airway pressure, respiratory therapy assess nares and determine need for appliance change or resting period.  Outcome: Progressing     Problem: Discharge Planning  Goal: Discharge to home or other facility with appropriate resources  Outcome: Progressing     Problem: Safety - Adult  Goal: Free from fall injury  Outcome: Progressing     Problem: Pain  Goal: Verbalizes/displays adequate comfort level or baseline comfort level  Outcome: Progressing

## 2024-02-21 LAB
ALBUMIN SERPL-MCNC: 3.5 G/DL (ref 3.5–5.2)
ALP SERPL-CCNC: 96 U/L (ref 35–104)
ALT SERPL-CCNC: 8 U/L (ref 0–32)
ANION GAP SERPL CALCULATED.3IONS-SCNC: 7 MMOL/L (ref 7–16)
AST SERPL-CCNC: 12 U/L (ref 0–31)
BILIRUB SERPL-MCNC: 0.2 MG/DL (ref 0–1.2)
BUN SERPL-MCNC: 22 MG/DL (ref 6–23)
CALCIUM SERPL-MCNC: 9.3 MG/DL (ref 8.6–10.2)
CHLORIDE SERPL-SCNC: 97 MMOL/L (ref 98–107)
CO2 SERPL-SCNC: 34 MMOL/L (ref 22–29)
CORTIS SERPL-MCNC: 2.3 UG/DL (ref 2.7–18.4)
CREAT SERPL-MCNC: 1 MG/DL (ref 0.5–1)
GFR SERPL CREATININE-BSD FRML MDRD: >60 ML/MIN/1.73M2
GLUCOSE BLD-MCNC: 208 MG/DL (ref 74–99)
GLUCOSE BLD-MCNC: 294 MG/DL (ref 74–99)
GLUCOSE BLD-MCNC: 377 MG/DL (ref 74–99)
GLUCOSE BLD-MCNC: 417 MG/DL (ref 74–99)
GLUCOSE SERPL-MCNC: 201 MG/DL (ref 74–99)
HCT VFR BLD AUTO: 37.9 % (ref 34–48)
HCT VFR BLD AUTO: 40.9 % (ref 34–48)
HGB BLD-MCNC: 11.6 G/DL (ref 11.5–15.5)
HGB BLD-MCNC: 12.5 G/DL (ref 11.5–15.5)
POTASSIUM SERPL-SCNC: 3.7 MMOL/L (ref 3.5–5)
PROT SERPL-MCNC: 6.1 G/DL (ref 6.4–8.3)
SODIUM SERPL-SCNC: 138 MMOL/L (ref 132–146)

## 2024-02-21 PROCEDURE — 6360000002 HC RX W HCPCS: Performed by: FAMILY MEDICINE

## 2024-02-21 PROCEDURE — 94660 CPAP INITIATION&MGMT: CPT

## 2024-02-21 PROCEDURE — 85014 HEMATOCRIT: CPT

## 2024-02-21 PROCEDURE — 99232 SBSQ HOSP IP/OBS MODERATE 35: CPT | Performed by: INTERNAL MEDICINE

## 2024-02-21 PROCEDURE — 83835 ASSAY OF METANEPHRINES: CPT

## 2024-02-21 PROCEDURE — 97535 SELF CARE MNGMENT TRAINING: CPT

## 2024-02-21 PROCEDURE — 36415 COLL VENOUS BLD VENIPUNCTURE: CPT

## 2024-02-21 PROCEDURE — 82088 ASSAY OF ALDOSTERONE: CPT

## 2024-02-21 PROCEDURE — 97530 THERAPEUTIC ACTIVITIES: CPT

## 2024-02-21 PROCEDURE — 2060000000 HC ICU INTERMEDIATE R&B

## 2024-02-21 PROCEDURE — 6370000000 HC RX 637 (ALT 250 FOR IP): Performed by: INTERNAL MEDICINE

## 2024-02-21 PROCEDURE — 94640 AIRWAY INHALATION TREATMENT: CPT

## 2024-02-21 PROCEDURE — 82962 GLUCOSE BLOOD TEST: CPT

## 2024-02-21 PROCEDURE — 6370000000 HC RX 637 (ALT 250 FOR IP): Performed by: FAMILY MEDICINE

## 2024-02-21 PROCEDURE — 84244 ASSAY OF RENIN: CPT

## 2024-02-21 PROCEDURE — 80053 COMPREHEN METABOLIC PANEL: CPT

## 2024-02-21 PROCEDURE — C9113 INJ PANTOPRAZOLE SODIUM, VIA: HCPCS | Performed by: FAMILY MEDICINE

## 2024-02-21 PROCEDURE — 85018 HEMOGLOBIN: CPT

## 2024-02-21 PROCEDURE — 2580000003 HC RX 258: Performed by: FAMILY MEDICINE

## 2024-02-21 PROCEDURE — 82533 TOTAL CORTISOL: CPT

## 2024-02-21 RX ORDER — INSULIN LISPRO 100 [IU]/ML
6 INJECTION, SOLUTION INTRAVENOUS; SUBCUTANEOUS ONCE
Status: COMPLETED | OUTPATIENT
Start: 2024-02-21 | End: 2024-02-21

## 2024-02-21 RX ORDER — INSULIN LISPRO 100 [IU]/ML
12 INJECTION, SOLUTION INTRAVENOUS; SUBCUTANEOUS
Status: DISCONTINUED | OUTPATIENT
Start: 2024-02-22 | End: 2024-02-22 | Stop reason: HOSPADM

## 2024-02-21 RX ADMIN — SODIUM CHLORIDE, PRESERVATIVE FREE 10 ML: 5 INJECTION INTRAVENOUS at 08:17

## 2024-02-21 RX ADMIN — INSULIN GLARGINE 40 UNITS: 100 INJECTION, SOLUTION SUBCUTANEOUS at 08:16

## 2024-02-21 RX ADMIN — POTASSIUM CHLORIDE 40 MEQ: 1500 TABLET, EXTENDED RELEASE ORAL at 08:13

## 2024-02-21 RX ADMIN — GABAPENTIN 200 MG: 100 CAPSULE ORAL at 20:19

## 2024-02-21 RX ADMIN — MICONAZOLE NITRATE: 20 POWDER TOPICAL at 08:15

## 2024-02-21 RX ADMIN — MICONAZOLE NITRATE: 20 POWDER TOPICAL at 20:18

## 2024-02-21 RX ADMIN — DULOXETINE HYDROCHLORIDE 60 MG: 60 CAPSULE, DELAYED RELEASE ORAL at 20:19

## 2024-02-21 RX ADMIN — TRAZODONE HYDROCHLORIDE 50 MG: 50 TABLET ORAL at 20:19

## 2024-02-21 RX ADMIN — HYDROXYZINE PAMOATE 50 MG: 25 CAPSULE ORAL at 20:19

## 2024-02-21 RX ADMIN — GABAPENTIN 200 MG: 100 CAPSULE ORAL at 13:34

## 2024-02-21 RX ADMIN — IPRATROPIUM BROMIDE 0.5 MG: 0.5 SOLUTION RESPIRATORY (INHALATION) at 08:24

## 2024-02-21 RX ADMIN — HYDROXYZINE PAMOATE 50 MG: 25 CAPSULE ORAL at 13:34

## 2024-02-21 RX ADMIN — IPRATROPIUM BROMIDE 0.5 MG: 0.5 SOLUTION RESPIRATORY (INHALATION) at 11:57

## 2024-02-21 RX ADMIN — HYDROXYZINE PAMOATE 50 MG: 25 CAPSULE ORAL at 08:13

## 2024-02-21 RX ADMIN — EZETIMIBE 10 MG: 10 TABLET ORAL at 20:19

## 2024-02-21 RX ADMIN — AMIODARONE HYDROCHLORIDE 100 MG: 200 TABLET ORAL at 08:13

## 2024-02-21 RX ADMIN — INSULIN GLARGINE 20 UNITS: 100 INJECTION, SOLUTION SUBCUTANEOUS at 20:23

## 2024-02-21 RX ADMIN — Medication 3 MG: at 20:19

## 2024-02-21 RX ADMIN — SODIUM CHLORIDE, PRESERVATIVE FREE 10 ML: 5 INJECTION INTRAVENOUS at 20:18

## 2024-02-21 RX ADMIN — FERROUS SULFATE TAB 325 MG (65 MG ELEMENTAL FE) 325 MG: 325 (65 FE) TAB at 08:13

## 2024-02-21 RX ADMIN — INSULIN LISPRO 8 UNITS: 100 INJECTION, SOLUTION INTRAVENOUS; SUBCUTANEOUS at 11:15

## 2024-02-21 RX ADMIN — INSULIN LISPRO 6 UNITS: 100 INJECTION, SOLUTION INTRAVENOUS; SUBCUTANEOUS at 20:45

## 2024-02-21 RX ADMIN — IPRATROPIUM BROMIDE 0.5 MG: 0.5 SOLUTION RESPIRATORY (INHALATION) at 19:51

## 2024-02-21 RX ADMIN — POTASSIUM CHLORIDE 40 MEQ: 1500 TABLET, EXTENDED RELEASE ORAL at 16:54

## 2024-02-21 RX ADMIN — BUMETANIDE 2 MG: 1 TABLET ORAL at 06:33

## 2024-02-21 RX ADMIN — INSULIN LISPRO 16 UNITS: 100 INJECTION, SOLUTION INTRAVENOUS; SUBCUTANEOUS at 16:53

## 2024-02-21 RX ADMIN — SODIUM CHLORIDE, PRESERVATIVE FREE 40 MG: 5 INJECTION INTRAVENOUS at 16:54

## 2024-02-21 RX ADMIN — ACETAMINOPHEN 1000 MG: 500 TABLET ORAL at 22:19

## 2024-02-21 RX ADMIN — INSULIN LISPRO 4 UNITS: 100 INJECTION, SOLUTION INTRAVENOUS; SUBCUTANEOUS at 06:34

## 2024-02-21 RX ADMIN — GABAPENTIN 200 MG: 100 CAPSULE ORAL at 08:13

## 2024-02-21 RX ADMIN — INSULIN LISPRO 4 UNITS: 100 INJECTION, SOLUTION INTRAVENOUS; SUBCUTANEOUS at 20:23

## 2024-02-21 ASSESSMENT — PAIN - FUNCTIONAL ASSESSMENT: PAIN_FUNCTIONAL_ASSESSMENT: ACTIVITIES ARE NOT PREVENTED

## 2024-02-21 ASSESSMENT — PAIN DESCRIPTION - ORIENTATION: ORIENTATION: RIGHT;LEFT

## 2024-02-21 ASSESSMENT — PAIN SCALES - GENERAL
PAINLEVEL_OUTOF10: 0
PAINLEVEL_OUTOF10: 3

## 2024-02-21 ASSESSMENT — PAIN DESCRIPTION - LOCATION: LOCATION: ANKLE;FOOT

## 2024-02-21 ASSESSMENT — PAIN DESCRIPTION - DESCRIPTORS: DESCRIPTORS: ACHING;DISCOMFORT

## 2024-02-21 NOTE — CONSULTS
[facility]  Hematology/Oncology  Consult  Dr. Jerome Feliz M.D.    Patient Name: Dara Betancur  YOB: 1955  PCP: Carrillo Correa DO   Referring Provider:      Reason for Consultation:   Chief Complaint   Patient presents with    Vaginal Bleeding     Started today, denies any pain or clots; on Eliquis        History of Present Illness:  68 years old female with extensive past medical history as mentioned below came to the ER complaining of bright red blood per rectum for past few days.  Labs on admission were fairly unremarkable.  Hemoglobin 12.9 MCV 79 white cell count 6000, platelet count 190.  FOBT was positive.  Eliquis held. Surgery GI were consulted.  No plans for inpatient endoscopy.  CT abdomen pelvis was done which showed a 7 cm right adrenal mass.  Retroperitoneal iliac chain and inguinal lymphadenopathy was noted as well.  Urology consulted.    Review of systems: Over 10 systems were reviewed and all were negative except as mention above.        Diagnostic Data:     Past Medical History:   Diagnosis Date    (HFpEF) heart failure with preserved ejection fraction (HCC)     1/16/2018- echo- LVEF 55-65%    Anal fissure     Anemia 10/6/2017    Anxiety and depression     Arthritis     Asthma     Atrial fibrillation (HCC)     Eliquis    Blood transfusion     long time no reaction    CHF (congestive heart failure) (HCC)     Diastolic    COPD (chronic obstructive pulmonary disease) (HCC)     Disc disorder     DM2 (diabetes mellitus, type 2) (HCC)     on insulin    Fall due to stumbling 10/6/2017    GERD (gastroesophageal reflux disease)     History of blood transfusion     Hx of blood clots     Hyperlipidemia     Hypertension     Inappropriate sinus tachycardia     LVH (left ventricular hypertrophy)     moderate    Lymphadenitis, chronic 4/13/2018    Occipital neuralgia 11/2/2011    Respiratory acidosis 10/7/2017    Sinus tachycardia 2/16/2015       Patient Active Problem List 
ENDOCRINOLOGY INITIAL CONSULTATION NOTE      Date of admission: 2/19/2024  Date of service: 2/20/2024  Admitting physician: Tami Wallace DO   Primary Care Physician: Carrillo Correa DO  Consultant physician: Alfonso Nascimento MD     Reason for the consultation:  Large right side adrenal tumor, type 2 diabetes    History of Present Illness:  Most of the history was obtained from the medical record.  The patient was very sleepy this evening  Dara Betancur is a very pleasant 68 y.o. old female with PMH including DM type 2 on insulin, chronic right buttock wound , COPD, HTN, morbid obesity, and others listed below admitted to [facility] on 2/19/2024 because of GI bleed, endocrine team was consulted for diabetes management.    The patient underwent a CT scan abdomen this admission which showed large 7 cm right-sided adrenal mass.  This lesion increase in size comparing with the previous studies.  It was measuring 4.4 cm in August 2021, and 5 cm in March 2022, and now measuring 7 cm.    The patient has diabetes and high blood pressure    Previous endocrine workup in August 2021 was negative    Prior to admission  The patient was diagnosed with type 2 DM at the age 42. Prior to admission patient was on Lantus 44u BID, Humalog 22u with meals plus ss, Jardiance 10 mg daily. Patient has had no hypoglycemic episodes. Patient has not been eating consistent carbohydrate meals, self-blood glucose monitoring has been above goal prior to admission.     Lab Results   Component Value Date/Time    LABA1C 10.1 11/20/2023 05:00 AM     Point of care glucose monitoring (Independently reviewed)   Recent Labs     02/19/24  1842 02/19/24  2313 02/20/24  0601 02/20/24  1035 02/20/24  1546   POCGLU 154* 173* 119* 176* 128*      Past medical history:   Past Medical History:   Diagnosis Date    (HFpEF) heart failure with preserved ejection fraction (HCC)     1/16/2018- echo- LVEF 55-65%    Anal fissure     Anemia 10/6/2017    Anxiety and 
2 tablets by mouth daily    Chikis Khan MD   insulin glargine (LANTUS) 100 UNIT/ML injection vial Inject 30 Units into the skin 2 times daily *SEE OTHER ORDER*  Patient taking differently: Inject 42 Units into the skin 2 times daily *SEE OTHER ORDER* 3/22/22   Iraida Kunz MD   DULoxetine (CYMBALTA) 60 MG extended release capsule Take 1 capsule by mouth nightly *SEE OTHER ORDER*    Chikis Khan MD   hydrOXYzine (ATARAX) 50 MG tablet Take 1 tablet by mouth 3 times daily    Chikis Khan MD   ipratropium (ATROVENT) 0.02 % nebulizer solution Take 2.5 mLs by nebulization 3 times daily *SEE OTHER ORDER*    Chikis Khan MD   ezetimibe (ZETIA) 10 MG tablet Take 1 tablet by mouth nightly 21   Iraida Kunz MD   acetaminophen (TYLENOL) 500 MG tablet Take 2 tablets by mouth every 6 hours as needed for Pain Indications: -NTE: 3G/24HRS-    Chikis Khan MD   apixaban (ELIQUIS) 5 MG TABS tablet Take 1 tablet by mouth 2 times daily    Chikis Khan MD   gabapentin (NEURONTIN) 100 MG capsule Take 2 capsules by mouth 3 times daily.    Chikis Khan MD       Allergies   Allergen Reactions    Statins Myalgia and Rash       Family History   Problem Relation Age of Onset    Heart Disease Mother     Diabetes Father     Colon Cancer Father     Other Father         BLINDNESS    Colon Cancer Sister     Other Sister         shingles- has had over 1 year    Diabetes Paternal Aunt     Diabetes Paternal Uncle     Diabetes Paternal Aunt     Diabetes Paternal Uncle     Diabetes Sister        Social History     Tobacco Use    Smoking status: Former     Current packs/day: 0.00     Average packs/day: 1.5 packs/day for 25.1 years (37.6 ttl pk-yrs)     Types: Cigarettes     Start date: 1979     Quit date: 2004     Years since quittin.1     Passive exposure: Past    Smokeless tobacco: Never    Tobacco comments:     Stopped smoking 16 years ago   Vaping Use    Vaping Use: 
unremarkable. The  uterus appears somewhat prominent given the patient's postmenopausal status.  The colon is distended and contains large amounts of gas and stool. The  appendix is surgically absent.  Retroperitoneal, iliac chain, and inguinal  lymphadenopathy appears somewhat improved since the prior exam.  Mesenteric  lymph nodes appear relatively stable.  No free intra-abdominal air or ascites  is identified. The lower thoracic and lumbar spine demonstrate degenerative  changes.     IMPRESSION:  Possible tiny hepatic and small left renal cysts.     Large right adrenal mass which has increased in size since March 2022.  This  finding demonstrates heterogeneous attenuation/enhancement and could be  hemorrhagic.     Distended gallbladder and cholelithiasis.     Somewhat prominent uterus given the patient's postmenopausal status.     Distended colon containing large amounts of gas and stool.     Somewhat improved retroperitoneal, iliac chain, and inguinal lymphadenopathy.     Irregular densities in both lung bases.  Follow-up CT chest is suggested.           Assessment/plan:  7cm right adrenal mass       CT imaging shows known right adrenal mass. This has grown about 2 cm since 2022. She was seen in our office last year and was to follow with [facility], Oncology, and Endocrinology.   Hemoglobin stable  GI following  Has followed with endocrinology in the past, will ask to see again   Will consult oncology as well  Will again need referral to [facility] for possible adrenalectomy   Will follow           Electronically signed by RAUL Sinclair CNP on 2/20/2024 at 1:50 PM  [facility]     I agree with the assessment and plan of JUWAN Sinclair. I personally evaluated the patient and made any changes to reflect my impression and plan.  I stressed to Dara the extreme importance that she follow-up with [facility].  She is high risk for surgery locally due to overall poor health.  Furthermore she should see 
demonstrates heterogeneous attenuation/enhancement and could be hemorrhagic. Distended gallbladder and cholelithiasis. Somewhat prominent uterus given the patient's postmenopausal status. Distended colon containing large amounts of gas and stool. Somewhat improved retroperitoneal, iliac chain, and inguinal lymphadenopathy. Irregular densities in both lung bases.  Follow-up CT chest is suggested.       IMPRESSION:  Vaginal bleeding per pt, ?rectal bleeding; possible diverticular vs hemorrhoidal vs others   Occult stool +  A-fib on Eliquis  History of GERD and gastritis  History of colon polyps    RECOMMENDATIONS:    Monitor stools  Vaginal vs rectal bleeding, close monitoring advised  Trend labs  No endoscopic work up planned at this time, will likely be arranged as OP  Diet as tolerated  Medical management per primary  Supportive care  Continue to monitor     Note: This report was completed utilizing computer voice recognition software. Every effort has been made to ensure accuracy, however; inadvertent computerized transcription errors may be present.     Thank you very much for your consultation. We will follow closely with you.    Discussed with Dr. Cloud  Treatment plan developed by Dr. Pedro Luis Palacios, APRN-NP-C 2/20/2024 10:54 AM for Dr. Cloud

## 2024-02-21 NOTE — PLAN OF CARE
Problem: ABCDS Injury Assessment  Goal: Absence of physical injury  Outcome: Progressing     Problem: Skin/Tissue Integrity  Goal: Absence of new skin breakdown  Description: 1.  Monitor for areas of redness and/or skin breakdown  2.  Assess vascular access sites hourly  3.  Every 4-6 hours minimum:  Change oxygen saturation probe site  4.  Every 4-6 hours:  If on nasal continuous positive airway pressure, respiratory therapy assess nares and determine need for appliance change or resting period.  Outcome: Progressing     Problem: Discharge Planning  Goal: Discharge to home or other facility with appropriate resources  Outcome: Progressing     Problem: Safety - Adult  Goal: Free from fall injury  Outcome: Progressing     Problem: Pain  Goal: Verbalizes/displays adequate comfort level or baseline comfort level  Outcome: Progressing     Problem: Chronic Conditions and Co-morbidities  Goal: Patient's chronic conditions and co-morbidity symptoms are monitored and maintained or improved  Outcome: Progressing

## 2024-02-21 NOTE — CARE COORDINATION
Social Work/Discharge Planning:   Requested for Myrna Wilson to start pre-cert.  Will continue to follow and wait for pre-cert.  Electronically signed by JADEN Kirby on 2/21/2024 at 10:12 AM    Addendum:  Myrna Wilson states patient pre-cert is still pending.  Will continue to follow and wait for pre-cert.  Electronically signed by JADEN Kirby on 2/21/2024 at 3:52 PM

## 2024-02-22 VITALS
WEIGHT: 247.7 LBS | DIASTOLIC BLOOD PRESSURE: 80 MMHG | OXYGEN SATURATION: 97 % | RESPIRATION RATE: 20 BRPM | TEMPERATURE: 97.9 F | SYSTOLIC BLOOD PRESSURE: 133 MMHG | HEART RATE: 70 BPM | BODY MASS INDEX: 42.29 KG/M2 | HEIGHT: 64 IN

## 2024-02-22 LAB
ALBUMIN SERPL-MCNC: 3.6 G/DL (ref 3.5–5.2)
ALP SERPL-CCNC: 107 U/L (ref 35–104)
ALT SERPL-CCNC: 8 U/L (ref 0–32)
ANION GAP SERPL CALCULATED.3IONS-SCNC: 9 MMOL/L (ref 7–16)
AST SERPL-CCNC: 12 U/L (ref 0–31)
BILIRUB SERPL-MCNC: <0.2 MG/DL (ref 0–1.2)
BUN SERPL-MCNC: 19 MG/DL (ref 6–23)
CALCIUM SERPL-MCNC: 9 MG/DL (ref 8.6–10.2)
CHLORIDE SERPL-SCNC: 96 MMOL/L (ref 98–107)
CO2 SERPL-SCNC: 30 MMOL/L (ref 22–29)
CREAT SERPL-MCNC: 1.1 MG/DL (ref 0.5–1)
GFR SERPL CREATININE-BSD FRML MDRD: 54 ML/MIN/1.73M2
GLUCOSE BLD-MCNC: 160 MG/DL (ref 74–99)
GLUCOSE BLD-MCNC: 189 MG/DL (ref 74–99)
GLUCOSE BLD-MCNC: 248 MG/DL (ref 74–99)
GLUCOSE SERPL-MCNC: 216 MG/DL (ref 74–99)
HCT VFR BLD AUTO: 42.5 % (ref 34–48)
HGB BLD-MCNC: 13 G/DL (ref 11.5–15.5)
POTASSIUM SERPL-SCNC: 4.1 MMOL/L (ref 3.5–5)
PROT SERPL-MCNC: 6.4 G/DL (ref 6.4–8.3)
SODIUM SERPL-SCNC: 135 MMOL/L (ref 132–146)

## 2024-02-22 PROCEDURE — 6360000002 HC RX W HCPCS: Performed by: FAMILY MEDICINE

## 2024-02-22 PROCEDURE — 6370000000 HC RX 637 (ALT 250 FOR IP): Performed by: INTERNAL MEDICINE

## 2024-02-22 PROCEDURE — 94640 AIRWAY INHALATION TREATMENT: CPT

## 2024-02-22 PROCEDURE — 2580000003 HC RX 258: Performed by: FAMILY MEDICINE

## 2024-02-22 PROCEDURE — 80053 COMPREHEN METABOLIC PANEL: CPT

## 2024-02-22 PROCEDURE — 85018 HEMOGLOBIN: CPT

## 2024-02-22 PROCEDURE — A4216 STERILE WATER/SALINE, 10 ML: HCPCS | Performed by: FAMILY MEDICINE

## 2024-02-22 PROCEDURE — 6370000000 HC RX 637 (ALT 250 FOR IP): Performed by: FAMILY MEDICINE

## 2024-02-22 PROCEDURE — 82962 GLUCOSE BLOOD TEST: CPT

## 2024-02-22 PROCEDURE — 85014 HEMATOCRIT: CPT

## 2024-02-22 PROCEDURE — C9113 INJ PANTOPRAZOLE SODIUM, VIA: HCPCS | Performed by: FAMILY MEDICINE

## 2024-02-22 PROCEDURE — 94660 CPAP INITIATION&MGMT: CPT

## 2024-02-22 RX ORDER — INSULIN LISPRO 100 [IU]/ML
0-18 INJECTION, SOLUTION INTRAVENOUS; SUBCUTANEOUS
Status: DISCONTINUED | OUTPATIENT
Start: 2024-02-22 | End: 2024-02-22 | Stop reason: HOSPADM

## 2024-02-22 RX ORDER — INSULIN GLARGINE 100 [IU]/ML
INJECTION, SOLUTION SUBCUTANEOUS
Qty: 10 ML | Refills: 3 | DISCHARGE
Start: 2024-02-22

## 2024-02-22 RX ADMIN — POTASSIUM CHLORIDE 40 MEQ: 1500 TABLET, EXTENDED RELEASE ORAL at 06:04

## 2024-02-22 RX ADMIN — IPRATROPIUM BROMIDE 0.5 MG: 0.5 SOLUTION RESPIRATORY (INHALATION) at 12:24

## 2024-02-22 RX ADMIN — INSULIN GLARGINE 40 UNITS: 100 INJECTION, SOLUTION SUBCUTANEOUS at 09:20

## 2024-02-22 RX ADMIN — INSULIN LISPRO 3 UNITS: 100 INJECTION, SOLUTION INTRAVENOUS; SUBCUTANEOUS at 16:32

## 2024-02-22 RX ADMIN — SODIUM CHLORIDE, PRESERVATIVE FREE 10 ML: 5 INJECTION INTRAVENOUS at 09:20

## 2024-02-22 RX ADMIN — AMIODARONE HYDROCHLORIDE 100 MG: 200 TABLET ORAL at 09:20

## 2024-02-22 RX ADMIN — IPRATROPIUM BROMIDE 0.5 MG: 0.5 SOLUTION RESPIRATORY (INHALATION) at 08:39

## 2024-02-22 RX ADMIN — GABAPENTIN 200 MG: 100 CAPSULE ORAL at 14:38

## 2024-02-22 RX ADMIN — INSULIN LISPRO 12 UNITS: 100 INJECTION, SOLUTION INTRAVENOUS; SUBCUTANEOUS at 11:46

## 2024-02-22 RX ADMIN — BUMETANIDE 2 MG: 1 TABLET ORAL at 06:04

## 2024-02-22 RX ADMIN — METOPROLOL SUCCINATE 50 MG: 50 TABLET, EXTENDED RELEASE ORAL at 09:20

## 2024-02-22 RX ADMIN — FERROUS SULFATE TAB 325 MG (65 MG ELEMENTAL FE) 325 MG: 325 (65 FE) TAB at 06:04

## 2024-02-22 RX ADMIN — GABAPENTIN 200 MG: 100 CAPSULE ORAL at 09:20

## 2024-02-22 RX ADMIN — HYDROXYZINE PAMOATE 50 MG: 25 CAPSULE ORAL at 09:20

## 2024-02-22 RX ADMIN — POTASSIUM CHLORIDE 40 MEQ: 1500 TABLET, EXTENDED RELEASE ORAL at 16:31

## 2024-02-22 RX ADMIN — HYDROXYZINE PAMOATE 50 MG: 25 CAPSULE ORAL at 14:38

## 2024-02-22 RX ADMIN — SODIUM CHLORIDE, PRESERVATIVE FREE 40 MG: 5 INJECTION INTRAVENOUS at 16:31

## 2024-02-22 RX ADMIN — INSULIN LISPRO 4 UNITS: 100 INJECTION, SOLUTION INTRAVENOUS; SUBCUTANEOUS at 06:08

## 2024-02-22 RX ADMIN — MICONAZOLE NITRATE: 20 POWDER TOPICAL at 09:21

## 2024-02-22 RX ADMIN — INSULIN LISPRO 12 UNITS: 100 INJECTION, SOLUTION INTRAVENOUS; SUBCUTANEOUS at 06:07

## 2024-02-22 RX ADMIN — INSULIN LISPRO 12 UNITS: 100 INJECTION, SOLUTION INTRAVENOUS; SUBCUTANEOUS at 16:32

## 2024-02-22 NOTE — DISCHARGE SUMMARY
achievable. COMPARISON: 03/08/2022. HISTORY: ORDERING SYSTEM PROVIDED HISTORY: abdominal pain TECHNOLOGIST PROVIDED HISTORY: Additional Contrast?->None Reason for exam:->abdominal pain Decision Support Exception - unselect if not a suspected or confirmed emergency medical condition->Emergency Medical Condition (MA) FINDINGS: Images of the lower chest demonstrate irregular densities in both lung bases. There are a few tiny hypodense foci in the liver.  These findings could represent cysts but are too small to accurately characterize. There may also be a small cyst in the upper pole of the left kidney. The spleen, pancreas, right kidney, and left adrenal gland appear unremarkable. There is a 7 cm right adrenal mass.  This finding has increased in size since the prior exam. It also demonstrates heterogeneous attenuation/enhancement and could be hemorrhagic. The gallbladder is distended and there is cholelithiasis. There is no biliary dilatation. The urinary bladder appears unremarkable. The uterus appears somewhat prominent given the patient's postmenopausal status. The colon is distended and contains large amounts of gas and stool. The appendix is surgically absent.  Retroperitoneal, iliac chain, and inguinal lymphadenopathy appears somewhat improved since the prior exam.  Mesenteric lymph nodes appear relatively stable.  No free intra-abdominal air or ascites is identified. The lower thoracic and lumbar spine demonstrate degenerative changes.     Possible tiny hepatic and small left renal cysts. Large right adrenal mass which has increased in size since March 2022.  This finding demonstrates heterogeneous attenuation/enhancement and could be hemorrhagic. Distended gallbladder and cholelithiasis. Somewhat prominent uterus given the patient's postmenopausal status. Distended colon containing large amounts of gas and stool. Somewhat improved retroperitoneal, iliac chain, and inguinal lymphadenopathy. Irregular densities

## 2024-02-22 NOTE — DISCHARGE INSTRUCTIONS
Diverticulosis: Care Instructions  Overview  In diverticulosis, pouches called diverticula form in the wall of the large intestine (colon). The pouches do not cause any pain or other symptoms. Most people who have diverticulosis do not know they have it.  If diverticulosis causes problems, the pouches may:  Bleed (diverticular bleeding).  Become infected (diverticulitis).  Diverticula form when pressure pushes the wall of the colon outward at certain weak points. A diet that is too low in fiber can cause diverticula.  Follow-up care is a key part of your treatment and safety. Be sure to make and go to all appointments, and call your doctor if you are having problems. It's also a good idea to know your test results and keep a list of the medicines you take.  How can you care for yourself at home?  Include fruits, leafy green vegetables, beans, and whole grains in your diet each day. These foods are high in fiber.  Take a fiber supplement (such as Citrucel or Metamucil) every day if needed. Read and follow all instructions on the label.  Drink plenty of fluids. If you have kidney, heart, or liver disease and have to limit fluids, talk with your doctor before you increase the amount of fluids you drink.  Get at least 30 minutes of exercise on most days of the week. Walking is a good choice. You also may want to do other activities, such as running, swimming, cycling, or playing tennis or team sports.  Cut out foods that cause gas, pain, or other symptoms.  When should you call for help?   Call your doctor now or seek immediate medical care if:    You pass maroon or very bloody stools.     You have new or worse belly pain.     You have a fever.     You have nausea or vomiting.     You have diarrhea or constipation.     You have unusual changes in your bowel movements.     You have bloating.     You cannot pass stools or gas.   Watch closely for changes in your health, and be sure to contact your doctor if you have

## 2024-02-22 NOTE — CARE COORDINATION
Social Work/Discharge Planning:  Discharge order noted.  Patient will return to [facility].  Called [facility] and arranged ambulette transport for 7:30pm.  Notified patient and she informed her family.  Notified RN and liaison Myrna with [facility] of discharge time.  Electronically signed by JADEN Kirby on 2/22/2024 at 3:55 PM

## 2024-02-22 NOTE — PROGRESS NOTES
02/20/24 2329   NIV Type   NIV Started/Stopped On   Equipment Type v60   Mode Bilevel   Mask Type Full face mask   Mask Size Medium   Settings/Measurements   PIP Observed 12 cm H20   IPAP 12 cmH20   CPAP/EPAP 6 cmH2O   Vt (Measured) 496 mL   Rate Ordered 18   FiO2  30 %   Minute Volume (L/min) 9.4 Liters   Mask Leak (lpm) 60 lpm   Patient's Home Machine No   Alarm Settings   Alarms On Y     Patient had CPAP order, tried up to CPAP of 10 and patient still not able to pull volumes. Switched to 12/6 with 30% and patient pulling Tidal Volumes in 400-500 range and tolerating well  
   02/22/24 0046   NIV Type   $NIV $Daily Charge   Mode (S)  Bilevel  (refuses use , standby)   Assessment   Pulse 86       
24 hr chart check complete  
24 hr chart check complete.  
4 Eyes Skin Assessment     NAME:  Dara Betancur  YOB: 1955  MEDICAL RECORD NUMBER:  [id number]    The patient is being assessed for  Admission    I agree that at least one RN has performed a thorough Head to Toe Skin Assessment on the patient. ALL assessment sites listed below have been assessed.      Areas assessed by both nurses:    Head, Face, Ears, Shoulders, Back, Chest, Arms, Elbows, Hands, Sacrum. Buttock, Coccyx, Ischium, and Legs. Feet and Heels        Does the Patient have a Wound? No noted wound(s)       Kavon Prevention initiated by RN: No  Wound Care Orders initiated by RN: No    Pressure Injury (Stage 3,4, Unstageable, DTI, NWPT, and Complex wounds) if present, place Wound referral order by RN under : No    New Ostomies, if present place, Ostomy referral order under : No     Nurse 1 eSignature: Electronically signed by Kika Clark RN on 2/20/24 at 12:52 AM EST    **SHARE this note so that the co-signing nurse can place an eSignature**    Nurse 2 eSignature: Electronically signed by Heather Costa RN on 2/20/24 at 2:04 AM EST   
[facility]  Hematology/Oncology  Consult  Dr. Jerome Feliz M.D.    Patient Name: Dara Betancur  YOB: 1955  PCP: Carrillo Correa DO   Referring Provider:      Reason for Consultation:   Chief Complaint   Patient presents with    Vaginal Bleeding     Started today, denies any pain or clots; on Eliquis        History of Present Illness:  68 years old female with extensive past medical history as mentioned below came to the ER complaining of bright red blood per rectum for past few days.  Labs on admission were fairly unremarkable.  Hemoglobin 12.9 MCV 79 white cell count 6000, platelet count 190.  FOBT was positive.  Eliquis held. Surgery GI were consulted.  No plans for inpatient endoscopy.  CT abdomen pelvis was done which showed a 7 cm right adrenal mass.  Retroperitoneal iliac chain and inguinal lymphadenopathy was noted as well.  Urology consulted.    Review of systems: Over 10 systems were reviewed and all were negative except as mention above    Diagnostic Data:     Past Medical History:   Diagnosis Date    (HFpEF) heart failure with preserved ejection fraction (HCC)     1/16/2018- echo- LVEF 55-65%    Anal fissure     Anemia 10/6/2017    Anxiety and depression     Arthritis     Asthma     Atrial fibrillation (HCC)     Eliquis    Blood transfusion     long time no reaction    CHF (congestive heart failure) (HCC)     Diastolic    COPD (chronic obstructive pulmonary disease) (HCC)     Disc disorder     DM2 (diabetes mellitus, type 2) (HCC)     on insulin    Fall due to stumbling 10/6/2017    GERD (gastroesophageal reflux disease)     History of blood transfusion     Hx of blood clots     Hyperlipidemia     Hypertension     Inappropriate sinus tachycardia     LVH (left ventricular hypertrophy)     moderate    Lymphadenitis, chronic 4/13/2018    Occipital neuralgia 11/2/2011    Respiratory acidosis 10/7/2017    Sinus tachycardia 2/16/2015       Patient Active Problem List    
Complaining of 7/10 ary foot and leg pain. No relief from 1000mg tylenol given at 2038. Perfect serve call to covering NP marisela mack whom will review chart and place orders as she see's appropriate.  
Consult called to Dr Feliz's office, and message sent regarding the consult to Dr Nascimento  
Consult called to urology through the answering service  
Database initiated. Patient is A&O comes in from [place]. She uses a wheelchair she can stand with a walker. She is RA during the day and wears 4 liters oxygen wtih c-pap at night.   
Date: 2/21/2024    Time: 4:01 AM    Patient Placed On BIPAP/CPAP/ Non-Invasive Ventilation?  Pt remains on    If no must comment.  Facial area red/color change? No           If YES are Blister/Lesion present?No   If yes must notify nursing staff  BIPAP/CPAP skin barrier?  No    Skin barrier type: not seen on pt        Comments: Machine plugged into red outlet and outside alarm plugged in.        
Dr. Nascimento notified of HS BS of 377. New order obtained to administer an additional 6units for a total of 10U of humalog.  
ENDOCRINOLOGY PROGRESS NOTE      Date of admission: 2/19/2024  Date of service: 2/21/2024  Admitting physician: Tami Wallace DO   Primary Care Physician: Carrillo Correa DO  Consultant physician: Alfonso Nascimento MD     Reason for the consultation:  Large right side adrenal tumor, type 2 diabetes    History of Present Illness:  Most of the history was obtained from the medical record.  The patient was very sleepy this evening  Dara Betancur is a very pleasant 68 y.o. old female with PMH including DM type 2 on insulin, chronic right buttock wound , COPD, HTN, morbid obesity, and others listed below admitted [facility] on [facility]/19/2024 because of GI bleed, endocrine team was consulted for diabetes management.    Subjective  I saw and examined the patient at bedside this afternoon, no acute events overnight.  Family at bedside.  Glucose level running high.    Point of care glucose monitoring (Independently reviewed)   Recent Labs     02/19/24  2313 02/20/24  0601 02/20/24  1035 02/20/24  1546 02/20/24  2109 02/21/24  0630 02/21/24  1102 02/21/24  1555   POCGLU 173* 119* 176* 128* 275* 208* 294* 417*        Scheduled Meds:   potassium chloride  40 mEq Oral BID WC    miconazole   Topical BID    insulin glargine  40 Units SubCUTAneous QAM    insulin glargine  20 Units SubCUTAneous Nightly    insulin lispro  0-16 Units SubCUTAneous TID WC    insulin lispro  0-4 Units SubCUTAneous Nightly    amiodarone  100 mg Oral Daily    bumetanide  2 mg Oral Daily    DULoxetine  60 mg Oral Nightly    ezetimibe  10 mg Oral Nightly    ferrous sulfate  325 mg Oral Daily with breakfast    gabapentin  200 mg Oral TID    ipratropium  0.5 mg Nebulization TID    melatonin  3 mg Oral Daily    metoprolol succinate  50 mg Oral Daily    traZODone  50 mg Oral Nightly    sodium chloride flush  5-40 mL IntraVENous 2 times per day    pantoprazole (PROTONIX) 40 mg in sodium chloride (PF) 0.9 % 10 mL injection  40 mg IntraVENous Daily    hydrOXYzine 
GENERAL SURGERY  DAILY PROGRESS NOTE    Patient's Name/Date of Birth: Dara Betancur / 1955    Date: 2024     Chief Complaint   Patient presents with    Vaginal Bleeding     Started today, denies any pain or clots; on Eliquis        Subjective:  Pt resting in bed. Denies bloody bowel movements, unsure if she has had bowel movement since being in hospital. Tolerating PO intake.      Objective:  Last 24Hrs  Temp  Av.9 °F (36.6 °C)  Min: 97.4 °F (36.3 °C)  Max: 98.2 °F (36.8 °C)  Resp  Av.4  Min: 16  Max: 21  Pulse  Av.9  Min: 58  Max: 76  Systolic (24hrs), Av , Min:103 , Max:124     Diastolic (24hrs), Av, Min:54, Max:74    SpO2  Av.7 %  Min: 93 %  Max: 100 %    I/O last 3 completed shifts:  In: -   Out: 950 [Urine:950]      General: In no acute distress  Cardiovascular: Warm throughout  Respiratory: no respiratory distress, equal chest rise  Abdomen: soft,  nontender, nondistended  Skin: no obvious rashes or lesions appreciated, no jaundice  Extremities: atraumatic      CBC  Recent Labs     24  1221 24  0617 24  1754 24  0028 24  0402   WBC 6.1  --   --   --   --    RBC 5.27  --   --   --   --    HGB 12.9   < > 13.0 11.6 12.5   HCT 41.7   < > 43.3 37.9 40.9   MCV 79.1*  --   --   --   --    MCH 24.5*  --   --   --   --    MCHC 30.9*  --   --   --   --    RDW 14.2  --   --   --   --      --   --   --   --    MPV 9.7  --   --   --   --     < > = values in this interval not displayed.       CMP  Recent Labs     24  1221 24  0617 24  0402    142 138   K 3.6 3.2* 3.7   CL 94* 99 97*   CO2 36* 36* 34*   BUN 22 23 22   CREATININE 1.1* 1.2* 1.0   GLUCOSE 234* 107* 201*   CALCIUM 9.1 9.0 9.3   PROT 6.8 5.7* 6.1*   LABALBU 4.1 3.4* 3.5   BILITOT 0.3 0.2 0.2   ALKPHOS 110* 89 96   AST 11 10 12   ALT 9 8 8         Assessment/Plan:    Patient Active Problem List   Diagnosis    Uncontrolled diabetes mellitus    Depression    
GENERAL SURGERY  DAILY PROGRESS NOTE    Patient's Name/Date of Birth: Dara Betancur / 1955    Date: 2024     Chief Complaint   Patient presents with    Vaginal Bleeding     Started today, denies any pain or clots; on Eliquis        Subjective:  Pt resting in bed. No further bloody bowel movements or other signs of clinical bleeding. Tolerating PO intake      Objective:  Last 24Hrs  Temp  Av.4 °F (36.3 °C)  Min: 96.7 °F (35.9 °C)  Max: 98.1 °F (36.7 °C)  Resp  Av  Min: 20  Max: 20  Pulse  Av.2  Min: 65  Max: 89  Systolic (24hrs), Av , Min:115 , Max:144     Diastolic (24hrs), Av, Min:60, Max:81    SpO2  Av.8 %  Min: 92 %  Max: 96 %    I/O last 3 completed shifts:  In: 900 [P.O.:900]  Out: 2950 [Urine:2950]      General: In no acute distress  Cardiovascular: Warm throughout  Respiratory: no respiratory distress, equal chest rise  Abdomen: soft,  nontender, nondistended  Skin: no obvious rashes or lesions appreciated, no jaundice  Extremities: atraumatic      CBC  Recent Labs     24  1221 24  0617 24  1754 24  0028 24  0402   WBC 6.1  --   --   --   --    RBC 5.27  --   --   --   --    HGB 12.9   < > 13.0 11.6 12.5   HCT 41.7   < > 43.3 37.9 40.9   MCV 79.1*  --   --   --   --    MCH 24.5*  --   --   --   --    MCHC 30.9*  --   --   --   --    RDW 14.2  --   --   --   --      --   --   --   --    MPV 9.7  --   --   --   --     < > = values in this interval not displayed.         CMP  Recent Labs     24  1221 24  0617 24  0402    142 138   K 3.6 3.2* 3.7   CL 94* 99 97*   CO2 36* 36* 34*   BUN 22 23 22   CREATININE 1.1* 1.2* 1.0   GLUCOSE 234* 107* 201*   CALCIUM 9.1 9.0 9.3   PROT 6.8 5.7* 6.1*   LABALBU 4.1 3.4* 3.5   BILITOT 0.3 0.2 0.2   ALKPHOS 110* 89 96   AST 11 10 12   ALT 9 8 8           Assessment/Plan:    Patient Active Problem List   Diagnosis    Uncontrolled diabetes mellitus    Depression    Essential 
Left voice mail via perfect serve for dr stone in regards to pt using a cpap at night with 4 liters at facility and needing an order for in hospital use if he recommends its use here. She tolerated 2L NC @HS all night; 98%.   
Notified Dr. Nascimento of , no additional insulin.   
Nurse to nurse called to Ro at [facility].   
Occupational Therapy  OT BEDSIDE TREATMENT NOTE      Date:2024  Patient Name: Dara Betancur  MRN: [id number]  : 1955  Room: [address] [place], [place] [address]     Evaluating OT: Christy Hammonds OTR/L - [id number]     Referring Provider: Tami Wallace DO  Specific Provider Orders/Date: \"OT eval and treat\" - 2024     Diagnosis: GI bleed [K92.2]  Chronic anticoagulation [Z79.01]  Adrenal mass (HCC) [E27.8]  Gastrointestinal hemorrhage, unspecified gastrointestinal hemorrhage type [K92.2]      Pertinent Medical History: CHF, COPD, HTN, DM, a-fib, anxiety and depression, arthritis      Precautions: fall risk,      Assessment of Current Deficits:    [x] Functional mobility             [x] ADLs          [x] Strength                  [x] Cognition   [x] Functional transfers           [x] IADLs         [x] Safety Awareness   [x] Endurance   [] Fine Motor Coordination    [x] Balance      [] Vision/Perception   [x] Sensation    [] Gross Motor Coordination [] ROM          [] Delirium                  [] Motor Control      OT PLAN OF CARE   OT POC is based on physician orders, patient diagnosis, and results of clinical assessment.  Frequency/Duration 2-5 days/week for 2 weeks PRN   Specific OT Treatment Interventions to Include:   * Instruction/training on adapted ADL techniques and AE recommendations to increase functional independence within precautions       * Training on energy conservation strategies, correct breathing pattern and techniques to improve independence/tolerance for self-care routine  * Functional transfer/mobility training/DME recommendations for increased independence, safety, and fall prevention  * Patient/Family education to increase follow through with safety techniques and functional independence  * Recommendation of environmental modifications for increased safety with functional transfers/mobility and ADLs  * Therapeutic exercise to improve motor endurance, ROM, and functional strength for 
PROGRESS NOTE    Patient Presents with/Seen in Consultation For      Reason for Consult: GI bleed     CHIEF COMPLAINT: Vaginal bleeding    Subjective:     Patient seen sitting up in bed, eating ice cream. Denies any N/V, or abdiominal pain. No overt bleeding noted. All questions answered.     Review of Systems  Aside from what was mentioned in the PMH and HPI, essentially unremarkable, all others negative.    Objective:     Patient Vitals for the past 8 hrs:   BP Temp Temp src Pulse Resp SpO2   02/21/24 1158 -- -- -- 74 -- 94 %   02/21/24 1035 115/68 98.1 °F (36.7 °C) Oral 65 20 94 %   02/21/24 0825 -- -- -- 71 -- 93 %   02/21/24 0651 124/67 97.6 °F (36.4 °C) Oral 58 20 95 %       General appearance: alert, awake, obese, sitting up in chair, and cooperative  Eyes: conjunctivae/corneas clear. PERRL.  Lungs: clear to auscultation bilaterally  Heart: regular rate and rhythm, no murmur, 2+ pulses;  without edema  Abdomen: soft, large, pendulous, non-tender; bowel sounds normal; no masses  Extremities: extremities without edema  Pulses: 2+ and symmetric  Skin: Skin color, texture, turgor normal.   Neurologic: Grossly normal    potassium chloride (KLOR-CON M) extended release tablet 40 mEq, BID WC  miconazole (MICOTIN) 2 % powder, BID  insulin glargine (LANTUS) injection vial 40 Units, QAM  insulin glargine (LANTUS) injection vial 20 Units, Nightly  insulin lispro (HUMALOG) injection vial 0-16 Units, TID WC  insulin lispro (HUMALOG) injection vial 0-4 Units, Nightly  white petrolatum ointment, BID PRN  acetaminophen (TYLENOL) tablet 1,000 mg, Q6H PRN  amiodarone (CORDARONE) tablet 100 mg, Daily  bumetanide (BUMEX) tablet 2 mg, Daily  DULoxetine (CYMBALTA) extended release capsule 60 mg, Nightly  ezetimibe (ZETIA) tablet 10 mg, Nightly  ferrous sulfate (IRON 325) tablet 325 mg, Daily with breakfast  gabapentin (NEURONTIN) capsule 200 mg, TID  ipratropium (ATROVENT) 0.02 % nebulizer solution 0.5 mg, TID  melatonin tablet 3 
Perfect serve out via phone to dr sullivan for routine new consult; gi bleed.   
Pharmacy Note    This patient was ordered empagliflozin (Jardiance) for Type 2 diabetes. Per the Pharmacy & Therapeutics Committee, this medication is non-formulary and not stocked by pharmacy for the reason indicated below. The medication can be reordered at discharge.     Medications in which risks outweigh benefits during hospitalization:           -  oral bisphosphonates         -  raloxifene (Evista)        -  SGLT2 inhibitors (ordered in the hospital for an indication other than heart failure or chronic kidney disease)    Medications that lack necessity during an acute hospital stay:        -  nasal antihistamines        -  nasal ipratropium 0.03% and 0.06%        -  nasal miacalcin        -  acyclovir topical cream/ointment orders for herpes labialis (cold sores)    
Stat gi consult out to dr greenwood answering service via phone.  
[Z79.01]  Adrenal mass (HCC) [E27.8]  Gastrointestinal hemorrhage, unspecified gastrointestinal hemorrhage type [K92.2]  PMHx/PSHx:  Asthma, Afib, CHF, COPD  Precautions:  falls, alarm    Social:  Pt admitted from SNF. States she mostly uses a wheelchair. Does walk some with staff and ww.    Initial Evaluation  Date: 2/20/24 Treatment  2/21/2024    Short Term/ Long Term   Goals   Was pt agreeable to Eval/treatment? yes yes    Does pt have pain? No c/o pain No complaints    Bed Mobility  Rolling: NT  Supine to sit: NT  Sit to supine: NT  Scooting: NT Rolling: SBA  Supine to sit: SBA  Scooting: SBA seated to EOB SBA   Transfers Sit to stand: SBA  Stand to sit: SBA  Stand pivot: SBA Sit <> stand: SBA supervision   Ambulation    50 feet with ww with CGA 15 + 60 feet with WW CG/ feet with ww with supervision   Stair Negotiation  Ascended and descended  NT NA  N/A   LE strength     4-/5    4/5   balance      Fair+     AM-PAC Raw score               16/24 17/24      Pt is alert, following instruction  Balance: fair/fair minus dynamic with WW    Pt performed therapeutic exercise of the following: NT    Patient education/treatment  Pt was educated on UE usage for transfer safety, gait mechanics promoting posture    Patient response to education:   Pt verbalized understanding Pt demonstrated skill Pt requires further education in this area   yes With instruction yes     ASSESSMENT:   Comments: Nurse ok with Rx. Pt found in bed, sat EOB SBA. Gait slow and consistent. Pt with initial mild unsteadiness, required light hands on assist for balance. Pt fatigued after activity, states has walked far enough.   Pt was left in a bedside chair with call light in reach    Chair/bed alarm: chair active    Time in 1412   Time out 1428   Total Treatment Time 16 minutes   CPT codes:     Therapeutic activities 79738 16 minutes   Therapeutic exercises 70852 0 minutes       Pt is making fair progress toward established Physical Therapy 
fluids  --clear liquid diet at this time in case of need for procedure, but has ct for this afternoon, so keep npo until after that  --ok to advance diet after ct chest        Lung densities/nodules  --ct chest w/ con ordered to f/u     Adrenal mass with abdominal adenopathy and lung nodules is concerning  --consult oncology  --will consult urology for their input  --check ct chest  --will have to review chart for previous w/u, pt states she was never told about any of this and has never had w/u, but she is an unreliable historian  --check LDH  --endo consulted by urology     2/21/2024  --appreciate input from multiple specialists  --no further bouts of bleeding reported  --hgb stable  --endo assisting w/ glycemic control  Ct chest clear of metastatic dx  --adrenal w/u pending per nephrology  --urology and onco recommend referral to [facility] for removal of adrenal tumor  --diabetic diet  --would imaging no further plans for endoscopy but will discuss w/ GI and gen surg services  --PPI therapy  --niv use prn and qhs is chronic  --vitals stable  --discharge plan? Awaiting pre-cert    Continue to manage stable chronic conditions with at home medications as prescribed, where appropriate.     PT/OT  DVT/PE prophylaxis  Social work for d/c planning  Code Full    I have spent a total time of 25 minutes of this patient encounter reviewing chart, labs, radiological reports coordinating care with interdisciplinary teams, face to face encounter with patient, providing counseling/education to patient/family, and formulating plan.       Tami Wallace, DO  9:59 AM  2/21/2024   
treat      Current Treatment Recommendations:     [x] Strengthening to improve independence with functional mobility   [] ROM to improve independence with functional mobility   [x] Balance Training to improve static/dynamic balance and to reduce fall risk  [x] Endurance Training to improve activity tolerance during functional mobility   [x] Transfer Training to improve safety and independence with all functional transfers   [x] Gait Training to improve gait mechanics, endurance and assess need for appropriate assistive device  [] Stair Training in preparation for safe discharge home and/or into the community   [] Positioning to prevent skin breakdown and contractures  [x] Safety and Education Training   [x] Patient/Caregiver Education   [] HEP  [] Other     PT long term treatment goals are located in above grid    Frequency of treatments: 2-5x/week x 5 days.    Time in  130  Time out  145  s    Evaluation Time includes thorough review of current medical information, gathering information on past medical history/social history and prior level of function, completion of standardized testing/informal observation of tasks, assessment of data and education on plan of care and goals.      CPT codes:  [x] Low Complexity PT evaluation 04950  [] Moderate Complexity PT evaluation 54987  [] High Complexity PT evaluation 88997  [] PT Re-evaluation 04388  [] Gait training 72616 minutes  [] Manual therapy 08691 minutes  [] Therapeutic activities 66978 minutes  [] Therapeutic exercises 21516 minutes  [] Neuromuscular reeducation 13843 minutes     Analia Pereira PT [id number]  
increase patient's safety and independence with completion of ADL/IADL tasks in order to maximize patient's functional independence and quality of life.    Rehab Potential: Good for established goals.  Patient / Family Goal: Patient did not state a goal.  Patient and/or family were instructed on functional diagnosis, prognosis/goals, and OT plan of care. Demonstrated Good understanding.    Eval Complexity: Low    Time In: 1335  Time Out: 1350  Total Treatment Time: 0 minutes      Minutes Units   OT Eval Low 41265 15 1   OT Eval Medium 54795     OT Eval High 36678     OT Re-Eval 65316     Therapeutic Ex 64604     Therapeutic Activities 64641     ADL/Self Care 67790     Orthotic Management 55544     Neuro Re-Ed 84503     Non-Billable Time N/A ---     Evaluation time includes thorough review of current medical information, gathering information on past medical history/social history and prior level of function, completion of standardized testing/informal observation of tasks, assessment of data, and education on plan of care and goals.    Christy Hammonds, OTR/L  License Number: [id number]

## 2024-02-22 NOTE — CARE COORDINATION
Social Work/Discharge Planning:  Myrna Wilson states patient pre-cert is still pending.  Will continue to follow and wait for pre-cert.  Electronically signed by JADEN Kirby on 2/22/2024 at 9:09 AM    Addendum:  Myrna Wilson called with insurance request for peer to peer.  Peer to peer (ph: [phone], option 5) will need to be completed by 2:30pm.  Physician will need to provide patient insurance number and date of birth.   left voicemail with Physician Nurse Practitioner Erin Roman.  Plan remains for patient to return to [facility].  Will continue to follow.  Electronically signed by JADEN Kirby on 2/22/2024 at 10:31 AM

## 2024-02-23 LAB
ALDOST SERPL-MCNC: 12.5 NG/DL
RENIN PLAS-CCNC: 3.2 NG/ML/HR

## 2024-02-24 LAB
METANEPH/PLASMA INTERP: NORMAL
METANEPHRINE: <0.1 NMOL/L (ref 0–0.49)
NORMETANEPHRINE PLASMA: 0.34 NMOL/L (ref 0–0.89)

## 2024-02-25 ENCOUNTER — TELEPHONE (OUTPATIENT)
Dept: ENDOCRINOLOGY | Age: 69
End: 2024-02-25

## 2024-02-26 NOTE — TELEPHONE ENCOUNTER
Please change this patient appointment to be with Shane or me.  She has a significant adrenal problems and because of this she will need to see me or shane Garcia or Cindy

## 2024-03-19 ENCOUNTER — OUTSIDE SERVICES (OUTPATIENT)
Dept: FAMILY MEDICINE CLINIC | Age: 69
End: 2024-03-19
Payer: COMMERCIAL

## 2024-03-19 DIAGNOSIS — I48.0 PAF (PAROXYSMAL ATRIAL FIBRILLATION) (HCC): Chronic | ICD-10-CM

## 2024-03-19 DIAGNOSIS — I82.5Z1 CHRONIC DEEP VEIN THROMBOSIS (DVT) OF DISTAL VEIN OF RIGHT LOWER EXTREMITY (HCC): ICD-10-CM

## 2024-03-19 DIAGNOSIS — J96.12 CHRONIC RESPIRATORY FAILURE WITH HYPERCAPNIA (HCC): ICD-10-CM

## 2024-03-19 DIAGNOSIS — I10 ESSENTIAL HYPERTENSION: Primary | ICD-10-CM

## 2024-03-19 DIAGNOSIS — I50.43 CHF (CONGESTIVE HEART FAILURE), NYHA CLASS I, ACUTE ON CHRONIC, COMBINED (HCC): ICD-10-CM

## 2024-03-19 DIAGNOSIS — E27.8 ADRENAL MASS (HCC): ICD-10-CM

## 2024-03-19 DIAGNOSIS — I27.20 MODERATE PULMONARY HYPERTENSION (HCC): ICD-10-CM

## 2024-03-19 DIAGNOSIS — E11.42 DIABETIC POLYNEUROPATHY ASSOCIATED WITH TYPE 2 DIABETES MELLITUS (HCC): Chronic | ICD-10-CM

## 2024-03-19 PROCEDURE — 99309 SBSQ NF CARE MODERATE MDM 30: CPT | Performed by: FAMILY MEDICINE

## 2024-03-19 NOTE — PROGRESS NOTES
SUBJECTIVE  Dara Betancur is a 68 y.o. female.    Seen: 3/14/2024     HPI/Chief C/O:    Allergies   Allergen Reactions    Statins Myalgia and Rash   Evaluated today after nurse reported episode of vaginal bleeding.  First noticed today.  Patient denies ever having episodes like this before.  She is on Eliquis for A-fib.  Patient is uncertain if she has had a hysterectomy.  Overall denies any chest pain shortness of breath or fatigue.  Denies any other complaints            Dara Betancru          ROS:  As above.     Past Medical/Surgical Hx;        Diagnosis Date    (HFpEF) heart failure with preserved ejection fraction (HCC)     1/16/2018- echo- LVEF 55-65%    Anal fissure     Anemia 10/6/2017    Anxiety and depression     Arthritis     Asthma     Atrial fibrillation (HCC)     Eliquis    Blood transfusion     long time no reaction    CHF (congestive heart failure) (HCC)     Diastolic    COPD (chronic obstructive pulmonary disease) (HCC)     Disc disorder     DM2 (diabetes mellitus, type 2) (HCC)     on insulin    Fall due to stumbling 10/6/2017    GERD (gastroesophageal reflux disease)     History of blood transfusion     Hx of blood clots     Hyperlipidemia     Hypertension     Inappropriate sinus tachycardia     LVH (left ventricular hypertrophy)     moderate    Lymphadenitis, chronic 4/13/2018    Occipital neuralgia 11/2/2011    Respiratory acidosis 10/7/2017    Sinus tachycardia 2/16/2015     Past Surgical History:   Procedure Laterality Date    ANOSCOPY  10/25/2006    injection of anal sphincter with Botox, Franklyn Ortiz/Timmy, [facility]    ANOSCOPY  4/9/2007    injection of anal sphincter with Botox, Dr. Ortiz, [facility]    ANOSCOPY  6/13/2007    injection of anal sphincter with Botox, Franklyn Ortiz/Timmy, [facility]    ANUS SURGERY  4/4/2006    Lateral sphincterotomy for chronic anal fissure, Dr. Mello, [facility]    ANUS SURGERY  4/18/2012    anal exam and injection of Botox 100 units into anal sphincter, Drs

## 2024-03-20 LAB
ANION GAP SERPL CALCULATED.3IONS-SCNC: 14 MMOL/L (ref 7–16)
CHLORIDE SERPL-SCNC: 93 MMOL/L (ref 98–107)
CO2 SERPL-SCNC: 31 MMOL/L (ref 22–29)
HBA1C MFR BLD: 10.7 % (ref 4–5.6)
POTASSIUM SERPL-SCNC: 3.7 MMOL/L (ref 3.5–5)
SODIUM SERPL-SCNC: 138 MMOL/L (ref 132–146)
TSH SERPL DL<=0.05 MIU/L-ACNC: 8.84 UIU/ML (ref 0.27–4.2)

## 2024-03-21 ENCOUNTER — CLINICAL DOCUMENTATION (OUTPATIENT)
Dept: FAMILY MEDICINE CLINIC | Age: 69
End: 2024-03-21

## 2024-04-02 NOTE — PROGRESS NOTES
amiodarone (PACERONE) 100 MG tablet Take 1 tablet by mouth daily      pantoprazole (PROTONIX) 40 MG tablet Take 1 tablet by mouth daily      traZODone (DESYREL) 50 MG tablet Take 1 tablet by mouth nightly      senna (SENOKOT) 8.6 MG tablet Take 2 tablets by mouth daily      DULoxetine (CYMBALTA) 60 MG extended release capsule Take 1 capsule by mouth nightly *SEE OTHER ORDER*      hydrOXYzine (ATARAX) 50 MG tablet Take 1 tablet by mouth 3 times daily      ipratropium (ATROVENT) 0.02 % nebulizer solution Take 2.5 mLs by nebulization 3 times daily *SEE OTHER ORDER*      ezetimibe (ZETIA) 10 MG tablet Take 1 tablet by mouth nightly 30 tablet 3    acetaminophen (TYLENOL) 500 MG tablet Take 2 tablets by mouth every 6 hours as needed for Pain Indications: -NTE: 3G/24HRS-      gabapentin (NEURONTIN) 100 MG capsule Take 2 capsules by mouth 3 times daily.       No facility-administered encounter medications on file as of 3/21/2024.       Reviewed recent labs related to Dara's current problems      Discussed importance of regular Health Maintenance follow up  Health Maintenance   Topic    Depression Monitoring     DTaP/Tdap/Td vaccine (1 - Tdap)    DEXA (modify frequency per FRAX score)     Respiratory Syncytial Virus (RSV) Pregnant or age 60 yrs+ (1 - 1-dose 60+ series)    Shingles vaccine (2 of 3)    Colorectal Cancer Screen     Pneumococcal 65+ years Vaccine (2 of 2 - PCV)    Diabetic retinal exam     Breast cancer screen     Diabetic foot exam     Diabetic Alb to Cr ratio (uACR) test     COVID-19 Vaccine (3 - 2023-24 season)    Annual Wellness Visit (Medicare Advantage)     Lipids     A1C test (Diabetic or Prediabetic)     Flu vaccine (Season Ended)    GFR test (Diabetes, CKD 3-4, OR last GFR 15-59)     Hepatitis C screen     Hepatitis A vaccine     Hepatitis B vaccine     Hib vaccine     Polio vaccine     Meningococcal (ACWY) vaccine      Seen with the resident  I have reviewed and agree with current care planning

## 2024-04-24 ENCOUNTER — OUTSIDE SERVICES (OUTPATIENT)
Dept: FAMILY MEDICINE CLINIC | Age: 69
End: 2024-04-24
Payer: COMMERCIAL

## 2024-04-24 DIAGNOSIS — I27.20 MODERATE PULMONARY HYPERTENSION (HCC): ICD-10-CM

## 2024-04-24 DIAGNOSIS — J96.12 CHRONIC RESPIRATORY FAILURE WITH HYPERCAPNIA (HCC): ICD-10-CM

## 2024-04-24 DIAGNOSIS — E11.42 DIABETIC POLYNEUROPATHY ASSOCIATED WITH TYPE 2 DIABETES MELLITUS (HCC): Chronic | ICD-10-CM

## 2024-04-24 DIAGNOSIS — E27.8 ADRENAL MASS (HCC): ICD-10-CM

## 2024-04-24 DIAGNOSIS — I50.43 CHF (CONGESTIVE HEART FAILURE), NYHA CLASS I, ACUTE ON CHRONIC, COMBINED (HCC): ICD-10-CM

## 2024-04-24 DIAGNOSIS — I27.20 PULMONARY HYPERTENSION (HCC): Chronic | ICD-10-CM

## 2024-04-24 DIAGNOSIS — E66.01 MORBID OBESITY WITH BMI OF 45.0-49.9, ADULT (HCC): ICD-10-CM

## 2024-04-24 DIAGNOSIS — J44.89 COPD WITH ASTHMA (HCC): ICD-10-CM

## 2024-04-24 DIAGNOSIS — I82.5Z1 CHRONIC DEEP VEIN THROMBOSIS (DVT) OF DISTAL VEIN OF RIGHT LOWER EXTREMITY (HCC): ICD-10-CM

## 2024-04-24 DIAGNOSIS — I48.0 PAF (PAROXYSMAL ATRIAL FIBRILLATION) (HCC): Primary | Chronic | ICD-10-CM

## 2024-04-24 DIAGNOSIS — J96.11 CHRONIC RESPIRATORY FAILURE WITH HYPOXIA (HCC): ICD-10-CM

## 2024-04-24 PROBLEM — L97.919 ULCERS OF BOTH LOWER LEGS (HCC): Status: RESOLVED | Noted: 2020-01-23 | Resolved: 2024-04-24

## 2024-04-24 PROBLEM — L97.929 ULCERS OF BOTH LOWER LEGS (HCC): Status: RESOLVED | Noted: 2020-01-23 | Resolved: 2024-04-24

## 2024-04-24 PROCEDURE — 99309 SBSQ NF CARE MODERATE MDM 30: CPT | Performed by: FAMILY MEDICINE

## 2024-04-24 NOTE — PROGRESS NOTES
stopped using. no caffeine now       OBJECTIVE  LMP  (LMP Unknown)     Problem List:  Dara has Anemia on their pertinent problem list.    PHYS EX:  Physical Exam  Constitutional:       General: They are not in acute distress.  HENT:      Head: Normocephalic and atraumatic.   Eyes:      Extraocular Movements: Extraocular movements intact.   Cardiovascular:      Rate and Rhythm: Normal rate and regular rhythm.      Heart sounds: No murmur heard.     No friction rub. No gallop.   Pulmonary:      Breath sounds: Normal breath sounds. No wheezing, rhonchi or rales. Patient with O2 via NC.  Abdominal:      General: There is no distension.      Tenderness: There is no abdominal tenderness.  Bright red blood at vaginal introitus.  No speculum available for pelvic exam moderate fungal infection in groin with red raw skin.    Musculoskeletal:      Right lower leg: No edema.      Left lower leg: No edema.   Neurological:      Mental Status: She is alert.    Skin:     Right hand with superficial skin breakdown and erythema.        ASSESSMENT/PLAN  Diagnoses and all orders for this visit:      Recent GI hemorrhage  No active bleeding at this time  Recent admission to the hospital 2/19 to 2/22  Eliquis was stopped at this time    Eczema  Aquaphor prn to bilateral forearms for 14 days    Right wrist pain 2/2 FOOSH  Wrist wrapped.     Headache  Tylenol prn    Poorly controlled diabetes mellitus  Last A1c 10.6  Continue Jardiance 10 mg po qd, Lantus 42 units twice daily, Humalog 6 units TID with meals with sliding scale  Glucose checks 4 times daily  Follows with endocrine-Dr. Nascimento    COPD  Ipratropium bromide inhaler TID    Essential hypertension  Amiodarone    Acute diastolic congestive heart failure  Bumex 2 mg QD  Empagliflozin    Ulcers of both lower legs  Wound care following    Anxiety  Duloxetine 60 mg QD  Hydroxyzine 50 mg TID    Paroxysmal atrial fibrillation (HCC), Chronic deep vein thrombosis (DVT) of distal vein of

## 2024-05-24 ENCOUNTER — OUTSIDE SERVICES (OUTPATIENT)
Dept: FAMILY MEDICINE CLINIC | Age: 69
End: 2024-05-24

## 2024-05-24 DIAGNOSIS — J96.11 CHRONIC RESPIRATORY FAILURE WITH HYPOXIA (HCC): ICD-10-CM

## 2024-05-24 DIAGNOSIS — E66.01 MORBID OBESITY WITH BMI OF 45.0-49.9, ADULT (HCC): ICD-10-CM

## 2024-05-24 DIAGNOSIS — E11.42 DIABETIC POLYNEUROPATHY ASSOCIATED WITH TYPE 2 DIABETES MELLITUS (HCC): Chronic | ICD-10-CM

## 2024-05-24 DIAGNOSIS — I50.43 CHF (CONGESTIVE HEART FAILURE), NYHA CLASS I, ACUTE ON CHRONIC, COMBINED (HCC): ICD-10-CM

## 2024-05-24 DIAGNOSIS — J44.89 COPD WITH ASTHMA (HCC): ICD-10-CM

## 2024-05-24 DIAGNOSIS — J96.12 CHRONIC RESPIRATORY FAILURE WITH HYPERCAPNIA (HCC): ICD-10-CM

## 2024-05-24 DIAGNOSIS — I82.5Z1 CHRONIC DEEP VEIN THROMBOSIS (DVT) OF DISTAL VEIN OF RIGHT LOWER EXTREMITY (HCC): ICD-10-CM

## 2024-05-24 DIAGNOSIS — I27.20 MODERATE PULMONARY HYPERTENSION (HCC): ICD-10-CM

## 2024-05-24 DIAGNOSIS — I10 ESSENTIAL HYPERTENSION: Primary | ICD-10-CM

## 2024-05-24 DIAGNOSIS — I48.0 PAF (PAROXYSMAL ATRIAL FIBRILLATION) (HCC): Chronic | ICD-10-CM

## 2024-06-21 ENCOUNTER — OUTSIDE SERVICES (OUTPATIENT)
Dept: FAMILY MEDICINE CLINIC | Age: 69
End: 2024-06-21

## 2024-06-21 DIAGNOSIS — J96.12 CHRONIC RESPIRATORY FAILURE WITH HYPERCAPNIA (HCC): ICD-10-CM

## 2024-06-21 DIAGNOSIS — J44.89 COPD WITH ASTHMA (HCC): ICD-10-CM

## 2024-06-21 DIAGNOSIS — I27.20 PULMONARY HYPERTENSION (HCC): Chronic | ICD-10-CM

## 2024-06-21 DIAGNOSIS — I27.20 MODERATE PULMONARY HYPERTENSION (HCC): ICD-10-CM

## 2024-06-21 DIAGNOSIS — E11.42 DIABETIC POLYNEUROPATHY ASSOCIATED WITH TYPE 2 DIABETES MELLITUS (HCC): Chronic | ICD-10-CM

## 2024-06-21 DIAGNOSIS — I48.0 PAF (PAROXYSMAL ATRIAL FIBRILLATION) (HCC): Primary | Chronic | ICD-10-CM

## 2024-06-21 DIAGNOSIS — I50.43 CHF (CONGESTIVE HEART FAILURE), NYHA CLASS I, ACUTE ON CHRONIC, COMBINED (HCC): ICD-10-CM

## 2024-06-21 DIAGNOSIS — I82.5Z1 CHRONIC DEEP VEIN THROMBOSIS (DVT) OF DISTAL VEIN OF RIGHT LOWER EXTREMITY (HCC): ICD-10-CM

## 2024-06-21 DIAGNOSIS — E27.8 ADRENAL MASS (HCC): ICD-10-CM

## 2024-07-09 ENCOUNTER — CLINICAL DOCUMENTATION (OUTPATIENT)
Dept: FAMILY MEDICINE CLINIC | Age: 69
End: 2024-07-09

## 2024-07-09 NOTE — PROGRESS NOTES
SUBJECTIVE  Dara Betancur is a 68 y.o. female.    Seen: 7/9/2024    HPI/Chief C/O:    Allergies   Allergen Reactions    Statins Myalgia and Rash       Dara Betancur was seen at the table today, She denies any acute concerns at this time.      On PE some RUQ TTP, denies pain after eating. Still has gallbladder. No systemic signs of infection. No fever, No nausea or vomiting, no diarrhea or constipation. Cont monitor.    She is medically stable  There are no new issues as per nursing          ROS:  As above.     Past Medical/Surgical Hx;        Diagnosis Date    (HFpEF) heart failure with preserved ejection fraction (HCC)     1/16/2018- echo- LVEF 55-65%    Anal fissure     Anemia 10/6/2017    Anxiety and depression     Arthritis     Asthma     Atrial fibrillation (HCC)     Eliquis    Blood transfusion     long time no reaction    CHF (congestive heart failure) (HCC)     Diastolic    COPD (chronic obstructive pulmonary disease) (HCC)     Disc disorder     DM2 (diabetes mellitus, type 2) ([facility])     on insulin    Fall due to stumbling 10/6/2017    GERD (gastroesophageal reflux disease)     History of blood transfusion     Hx of blood clots     Hyperlipidemia     Hypertension     Inappropriate sinus tachycardia     LVH (left ventricular hypertrophy)     moderate    Lymphadenitis, chronic 4/13/2018    Occipital neuralgia 11/2/2011    Respiratory acidosis 10/7/2017    Sinus tachycardia 2/16/2015     Past Surgical History:   Procedure Laterality Date    ANOSCOPY  10/25/2006    injection of anal sphincter with Botox, Franklyn Ortiz/Timmy, [facility]    ANOSCOPY  4/9/2007    injection of anal sphincter with Botox, Dr. Ortiz, [facility]    ANOSCOPY  6/13/2007    injection of anal sphincter with Botox, Franklyn Farley, [facility]    ANUS SURGERY  4/4/2006    Lateral sphincterotomy for chronic anal fissure, Dr. Mello, [facility]    ANUS SURGERY  4/18/2012    anal exam and injection of Botox 100 units into anal sphincter, Laila Ortiz/Timmy,

## 2024-07-10 ENCOUNTER — OUTSIDE SERVICES (OUTPATIENT)
Dept: FAMILY MEDICINE CLINIC | Age: 69
End: 2024-07-10

## 2024-07-10 DIAGNOSIS — I50.43 CHF (CONGESTIVE HEART FAILURE), NYHA CLASS I, ACUTE ON CHRONIC, COMBINED (HCC): ICD-10-CM

## 2024-07-10 DIAGNOSIS — E11.42 DIABETIC POLYNEUROPATHY ASSOCIATED WITH TYPE 2 DIABETES MELLITUS (HCC): Chronic | ICD-10-CM

## 2024-07-10 DIAGNOSIS — I82.5Z1 CHRONIC DEEP VEIN THROMBOSIS (DVT) OF DISTAL VEIN OF RIGHT LOWER EXTREMITY (HCC): ICD-10-CM

## 2024-07-10 DIAGNOSIS — I27.20 MODERATE PULMONARY HYPERTENSION (HCC): ICD-10-CM

## 2024-07-10 DIAGNOSIS — I48.0 PAF (PAROXYSMAL ATRIAL FIBRILLATION) (HCC): Chronic | ICD-10-CM

## 2024-07-10 DIAGNOSIS — I27.20 PULMONARY HYPERTENSION (HCC): Chronic | ICD-10-CM

## 2024-07-10 DIAGNOSIS — I10 ESSENTIAL HYPERTENSION: Primary | ICD-10-CM

## 2024-07-10 DIAGNOSIS — J96.12 CHRONIC RESPIRATORY FAILURE WITH HYPERCAPNIA (HCC): ICD-10-CM

## 2024-07-10 DIAGNOSIS — J96.11 CHRONIC RESPIRATORY FAILURE WITH HYPOXIA (HCC): ICD-10-CM

## 2024-07-10 DIAGNOSIS — J44.89 COPD WITH ASTHMA (HCC): ICD-10-CM

## 2024-07-10 DIAGNOSIS — E27.8 ADRENAL MASS (HCC): ICD-10-CM

## 2024-07-10 NOTE — PROGRESS NOTES
smoking 16 years ago   Substance Use Topics    Alcohol use: Not Currently     Comment: 1 can coke daily stopped using. no caffeine now       OBJECTIVE  LMP  (LMP Unknown)     Problem List:  Dara has Anemia on their pertinent problem list.    PHYS EX:  Physical Exam  Constitutional:       General: They are not in acute distress.  HENT:      Head: Normocephalic and atraumatic.   Eyes:      Extraocular Movements: Extraocular movements intact.   Cardiovascular:      Rate and Rhythm: Normal rate and regular rhythm.      Heart sounds: No murmur heard.     No friction rub. No gallop.   Pulmonary:      Breath sounds: Normal breath sounds. No wheezing, rhonchi or rales. Patient with O2 via NC.  Abdominal:      General: There is no distension.      Tenderness: There is mild TTP in RUQ  Musculoskeletal:      Right lower leg: lymphedema.      Left lower leg:lymphedema.   Neurological:      Mental Status: She is alert.    Skin:     Right hand with superficial skin breakdown and erythema.        ASSESSMENT/PLAN  Diagnoses and all orders for this visit:      Recent GI hemorrhage  No active bleeding at this time  Recent admission to the hospital 2/19 to 2/22  Eliquis was stopped at this time    Eczema  Aquaphor prn to bilateral forearms for 14 days    Right wrist pain 2/2 FOOSH  Wrist wrapped.     Headache  Tylenol prn    Poorly controlled diabetes mellitus  Last A1c 10.6  Continue Jardiance 10 mg po qd, Lantus 42 units twice daily, Humalog 6 units TID with meals with sliding scale  Glucose checks 4 times daily  Follows with endocrine-Dr. Nascimento    COPD  Ipratropium bromide inhaler TID    Essential hypertension  Amiodarone    Acute diastolic congestive heart failure  Bumex 2 mg QD  Empagliflozin    Ulcers of both lower legs  Wound care following    Anxiety  Duloxetine 60 mg QD  Hydroxyzine 50 mg TID    Paroxysmal atrial fibrillation (HCC), Chronic deep vein thrombosis (DVT) of distal vein of right lower extremity (HCC)  Amiodarone 
N/A

## 2024-08-24 ENCOUNTER — OUTSIDE SERVICES (OUTPATIENT)
Dept: FAMILY MEDICINE CLINIC | Age: 69
End: 2024-08-24

## 2024-08-24 DIAGNOSIS — I27.20 MODERATE PULMONARY HYPERTENSION (HCC): ICD-10-CM

## 2024-08-24 DIAGNOSIS — J44.89 COPD WITH ASTHMA (HCC): ICD-10-CM

## 2024-08-24 DIAGNOSIS — E11.42 DIABETIC POLYNEUROPATHY ASSOCIATED WITH TYPE 2 DIABETES MELLITUS (HCC): Chronic | ICD-10-CM

## 2024-08-24 DIAGNOSIS — I82.5Z1 CHRONIC DEEP VEIN THROMBOSIS (DVT) OF DISTAL VEIN OF RIGHT LOWER EXTREMITY (HCC): Primary | ICD-10-CM

## 2024-08-24 DIAGNOSIS — E27.8 ADRENAL MASS (HCC): ICD-10-CM

## 2024-08-24 DIAGNOSIS — J96.12 CHRONIC RESPIRATORY FAILURE WITH HYPERCAPNIA (HCC): ICD-10-CM

## 2024-08-24 DIAGNOSIS — E66.01 MORBID OBESITY WITH BMI OF 45.0-49.9, ADULT (HCC): ICD-10-CM

## 2024-08-24 DIAGNOSIS — I48.0 PAF (PAROXYSMAL ATRIAL FIBRILLATION) (HCC): Chronic | ICD-10-CM

## 2024-08-24 DIAGNOSIS — R76.8 POSITIVE HEPATITIS C ANTIBODY TEST: ICD-10-CM

## 2024-08-24 DIAGNOSIS — J96.11 CHRONIC RESPIRATORY FAILURE WITH HYPOXIA (HCC): ICD-10-CM

## 2024-08-24 DIAGNOSIS — I50.43 CHF (CONGESTIVE HEART FAILURE), NYHA CLASS I, ACUTE ON CHRONIC, COMBINED (HCC): ICD-10-CM

## 2024-08-24 NOTE — PROGRESS NOTES
SUBJECTIVE  Dara Betancur is a 68 y.o. female.    Seen: 8/15/2024    HPI/Chief C/O:    Allergies   Allergen Reactions    Statins Myalgia and Rash       Dara Betancur was seen at the table today, She denies any acute concerns at this time.      On PE some RUQ TTP, denies pain after eating. Still has gallbladder. No systemic signs of infection. No fever, No nausea or vomiting, no diarrhea or constipation. Cont monitor.    She is medically stable  There are no new issues as per nursing          ROS:  As above.     Past Medical/Surgical Hx;        Diagnosis Date    (HFpEF) heart failure with preserved ejection fraction (HCC)     1/16/2018- echo- LVEF 55-65%    Anal fissure     Anemia 10/6/2017    Anxiety and depression     Arthritis     Asthma     Atrial fibrillation (HCC)     Eliquis    Blood transfusion     long time no reaction    CHF (congestive heart failure) (HCC)     Diastolic    COPD (chronic obstructive pulmonary disease) (HCC)     Disc disorder     DM2 (diabetes mellitus, type 2) ([facility])     on insulin    Fall due to stumbling 10/6/2017    GERD (gastroesophageal reflux disease)     History of blood transfusion     Hx of blood clots     Hyperlipidemia     Hypertension     Inappropriate sinus tachycardia     LVH (left ventricular hypertrophy)     moderate    Lymphadenitis, chronic 4/13/2018    Occipital neuralgia 11/2/2011    Respiratory acidosis 10/7/2017    Sinus tachycardia 2/16/2015     Past Surgical History:   Procedure Laterality Date    ANOSCOPY  10/25/2006    injection of anal sphincter with Botox, Franklyn Ortiz/Timmy, [facility]    ANOSCOPY  4/9/2007    injection of anal sphincter with Botox, Dr. Ortiz, [facility]    ANOSCOPY  6/13/2007    injection of anal sphincter with Botox, Franklyn Farley, [facility]    ANUS SURGERY  4/4/2006    Lateral sphincterotomy for chronic anal fissure, Dr. Mello, [facility]    ANUS SURGERY  4/18/2012    anal exam and injection of Botox 100 units into anal sphincter, Laila Ortiz/Timmy,

## 2024-09-10 ENCOUNTER — OUTSIDE SERVICES (OUTPATIENT)
Dept: FAMILY MEDICINE CLINIC | Age: 69
End: 2024-09-10

## 2024-09-10 DIAGNOSIS — I48.0 PAF (PAROXYSMAL ATRIAL FIBRILLATION) (HCC): Chronic | ICD-10-CM

## 2024-09-10 DIAGNOSIS — J44.89 COPD WITH ASTHMA (HCC): ICD-10-CM

## 2024-09-10 DIAGNOSIS — I27.20 MODERATE PULMONARY HYPERTENSION (HCC): ICD-10-CM

## 2024-09-10 DIAGNOSIS — I50.43 CHF (CONGESTIVE HEART FAILURE), NYHA CLASS I, ACUTE ON CHRONIC, COMBINED (HCC): ICD-10-CM

## 2024-09-10 DIAGNOSIS — J96.11 CHRONIC RESPIRATORY FAILURE WITH HYPOXIA (HCC): Primary | ICD-10-CM

## 2024-09-10 DIAGNOSIS — E27.8 ADRENAL MASS (HCC): ICD-10-CM

## 2024-09-10 DIAGNOSIS — E11.42 DIABETIC POLYNEUROPATHY ASSOCIATED WITH TYPE 2 DIABETES MELLITUS (HCC): Chronic | ICD-10-CM

## 2024-09-10 DIAGNOSIS — J96.12 CHRONIC RESPIRATORY FAILURE WITH HYPERCAPNIA (HCC): ICD-10-CM

## 2024-10-25 ENCOUNTER — OUTSIDE SERVICES (OUTPATIENT)
Dept: FAMILY MEDICINE CLINIC | Age: 69
End: 2024-10-25

## 2024-10-25 DIAGNOSIS — I48.0 PAF (PAROXYSMAL ATRIAL FIBRILLATION) (HCC): Chronic | ICD-10-CM

## 2024-10-25 DIAGNOSIS — J44.89 COPD WITH ASTHMA (HCC): ICD-10-CM

## 2024-10-25 DIAGNOSIS — E27.8 ADRENAL MASS (HCC): ICD-10-CM

## 2024-10-25 DIAGNOSIS — I27.20 MODERATE PULMONARY HYPERTENSION (HCC): ICD-10-CM

## 2024-10-25 DIAGNOSIS — I82.5Z1 CHRONIC DEEP VEIN THROMBOSIS (DVT) OF DISTAL VEIN OF RIGHT LOWER EXTREMITY (HCC): ICD-10-CM

## 2024-10-25 DIAGNOSIS — E11.42 DIABETIC POLYNEUROPATHY ASSOCIATED WITH TYPE 2 DIABETES MELLITUS (HCC): Chronic | ICD-10-CM

## 2024-10-25 DIAGNOSIS — I10 ESSENTIAL HYPERTENSION: Primary | ICD-10-CM

## 2024-10-25 DIAGNOSIS — I50.43 CHF (CONGESTIVE HEART FAILURE), NYHA CLASS I, ACUTE ON CHRONIC, COMBINED (HCC): ICD-10-CM

## 2024-10-25 NOTE — PROGRESS NOTES
Take 1 tablet by mouth daily      pantoprazole (PROTONIX) 40 MG tablet Take 1 tablet by mouth daily      traZODone (DESYREL) 50 MG tablet Take 1 tablet by mouth nightly      senna (SENOKOT) 8.6 MG tablet Take 2 tablets by mouth daily      DULoxetine (CYMBALTA) 60 MG extended release capsule Take 1 capsule by mouth nightly *SEE OTHER ORDER*      hydrOXYzine (ATARAX) 50 MG tablet Take 1 tablet by mouth 3 times daily      ipratropium (ATROVENT) 0.02 % nebulizer solution Take 2.5 mLs by nebulization 3 times daily *SEE OTHER ORDER*      ezetimibe (ZETIA) 10 MG tablet Take 1 tablet by mouth nightly 30 tablet 3    acetaminophen (TYLENOL) 500 MG tablet Take 2 tablets by mouth every 6 hours as needed for Pain Indications: -NTE: 3G/24HRS-      gabapentin (NEURONTIN) 100 MG capsule Take 2 capsules by mouth 3 times daily.       No facility-administered encounter medications on file as of 10/25/2024.       Reviewed recent labs related to Dara's current problems      Discussed importance of regular Health Maintenance follow up  Health Maintenance   Topic    Depression Monitoring     DTaP/Tdap/Td vaccine (1 - Tdap)    DEXA (modify frequency per FRAX score)     Respiratory Syncytial Virus (RSV) Pregnant or age 60 yrs+ (1 - 1-dose 60+ series)    Shingles vaccine (2 of 3)    Colorectal Cancer Screen     Pneumococcal 65+ years Vaccine (2 of 2 - PCV)    Diabetic retinal exam     Breast cancer screen     Diabetic foot exam     Diabetic Alb to Cr ratio (uACR) test     Annual Wellness Visit (Medicare Advantage)     Lipids     A1C test (Diabetic or Prediabetic)     Flu vaccine (1)    COVID-19 Vaccine (3 - 2023-24 season)    GFR test (Diabetes, CKD 3-4, OR last GFR 15-59)     Hepatitis C screen     Hepatitis A vaccine     Hepatitis B vaccine     Hib vaccine     Polio vaccine     Meningococcal (ACWY) vaccine              Mustapha Fuentes,     10/25/2024 12:51 PM

## 2024-11-20 ENCOUNTER — OUTSIDE SERVICES (OUTPATIENT)
Dept: FAMILY MEDICINE CLINIC | Age: 69
End: 2024-11-20
Payer: COMMERCIAL

## 2024-11-20 DIAGNOSIS — I48.0 PAF (PAROXYSMAL ATRIAL FIBRILLATION) (HCC): Chronic | ICD-10-CM

## 2024-11-20 DIAGNOSIS — I82.5Z1 CHRONIC DEEP VEIN THROMBOSIS (DVT) OF DISTAL VEIN OF RIGHT LOWER EXTREMITY (HCC): ICD-10-CM

## 2024-11-20 DIAGNOSIS — E11.42 DIABETIC POLYNEUROPATHY ASSOCIATED WITH TYPE 2 DIABETES MELLITUS (HCC): Chronic | ICD-10-CM

## 2024-11-20 DIAGNOSIS — I89.0 LYMPHEDEMA: ICD-10-CM

## 2024-11-20 DIAGNOSIS — I27.20 MODERATE PULMONARY HYPERTENSION (HCC): ICD-10-CM

## 2024-11-20 DIAGNOSIS — J44.89 COPD WITH ASTHMA (HCC): ICD-10-CM

## 2024-11-20 DIAGNOSIS — E27.8 ADRENAL MASS (HCC): ICD-10-CM

## 2024-11-20 DIAGNOSIS — I10 ESSENTIAL HYPERTENSION: Primary | ICD-10-CM

## 2024-11-20 DIAGNOSIS — I50.43 CHF (CONGESTIVE HEART FAILURE), NYHA CLASS I, ACUTE ON CHRONIC, COMBINED (HCC): ICD-10-CM

## 2024-11-20 PROCEDURE — 99309 SBSQ NF CARE MODERATE MDM 30: CPT | Performed by: FAMILY MEDICINE

## 2024-11-20 NOTE — PROGRESS NOTES
SUBJECTIVE  Dara Betancur is a 68 y.o. female.    Seen: 2024    HPI/Chief C/O:    Allergies   Allergen Reactions    Statins Myalgia and Rash       Dara Betancur was seen at the table today, She denies any acute concerns at this time.        She is medically stable  There are no new issues as per nursing          ROS:  As above.     Past Medical/Surgical Hx;        Diagnosis Date    (HFpEF) heart failure with preserved ejection fraction (HCC)     2018- echo- LVEF 55-65%    Anal fissure     Anemia 10/6/2017    Anxiety and depression     Arthritis     Asthma     Atrial fibrillation (HCC)     Eliquis    Blood transfusion     long time no reaction    CHF (congestive heart failure) (HCC)     Diastolic    COPD (chronic obstructive pulmonary disease) (HCC)     Disc disorder     DM2 (diabetes mellitus, type 2) (HCC)     on insulin    Fall due to stumbling 10/6/2017    GERD (gastroesophageal reflux disease)     History of blood transfusion     Hx of blood clots     Hyperlipidemia     Hypertension     Inappropriate sinus tachycardia     LVH (left ventricular hypertrophy)     moderate    Lymphadenitis, chronic 2018    Occipital neuralgia 2011    Respiratory acidosis 10/7/2017    Sinus tachycardia 2015     Past Surgical History:   Procedure Laterality Date    ANOSCOPY  10/25/2006    injection of anal sphincter with Botox, Franklyn Ortiz/Timmy, [facility]    ANOSCOPY  2007    injection of anal sphincter with Botox, Dr. Ortiz, [facility]    ANOSCOPY  2007    injection of anal sphincter with Botox, Franklyn Ortiz/Timmy,     ANUS SURGERY  2006    Lateral sphincterotomy for chronic anal fissure, Dr. Mello, [facility] [place]    ANUS SURGERY  2012    anal exam and injection of Botox 100 units into anal sphincter, Laila Ortiz/Timmy, [facility]    APPENDECTOMY  1965     SECTION       SECTION       SECTION      COLONOSCOPY  2004    cecal polyp, bx (no pathologic changes),

## 2024-12-03 ENCOUNTER — OFFICE VISIT (OUTPATIENT)
Dept: ENDOCRINOLOGY | Age: 69
End: 2024-12-03
Payer: COMMERCIAL

## 2024-12-03 VITALS
RESPIRATION RATE: 18 BRPM | HEIGHT: 64 IN | DIASTOLIC BLOOD PRESSURE: 73 MMHG | BODY MASS INDEX: 42.52 KG/M2 | SYSTOLIC BLOOD PRESSURE: 110 MMHG | HEART RATE: 83 BPM | OXYGEN SATURATION: 97 %

## 2024-12-03 DIAGNOSIS — E27.8 ADRENAL INCIDENTALOMA (HCC): ICD-10-CM

## 2024-12-03 DIAGNOSIS — Z79.4 TYPE 2 DIABETES MELLITUS WITH HYPERGLYCEMIA, WITH LONG-TERM CURRENT USE OF INSULIN (HCC): Primary | ICD-10-CM

## 2024-12-03 DIAGNOSIS — E11.65 TYPE 2 DIABETES MELLITUS WITH HYPERGLYCEMIA, WITH LONG-TERM CURRENT USE OF INSULIN (HCC): Primary | ICD-10-CM

## 2024-12-03 LAB — HBA1C MFR BLD: 8 %

## 2024-12-03 PROCEDURE — 99214 OFFICE O/P EST MOD 30 MIN: CPT | Performed by: INTERNAL MEDICINE

## 2024-12-03 PROCEDURE — G8417 CALC BMI ABV UP PARAM F/U: HCPCS | Performed by: INTERNAL MEDICINE

## 2024-12-03 PROCEDURE — G8427 DOCREV CUR MEDS BY ELIG CLIN: HCPCS | Performed by: INTERNAL MEDICINE

## 2024-12-03 PROCEDURE — 3017F COLORECTAL CA SCREEN DOC REV: CPT | Performed by: INTERNAL MEDICINE

## 2024-12-03 PROCEDURE — 1159F MED LIST DOCD IN RCRD: CPT | Performed by: INTERNAL MEDICINE

## 2024-12-03 PROCEDURE — 3052F HG A1C>EQUAL 8.0%<EQUAL 9.0%: CPT | Performed by: INTERNAL MEDICINE

## 2024-12-03 PROCEDURE — 3078F DIAST BP <80 MM HG: CPT | Performed by: INTERNAL MEDICINE

## 2024-12-03 PROCEDURE — 3074F SYST BP LT 130 MM HG: CPT | Performed by: INTERNAL MEDICINE

## 2024-12-03 PROCEDURE — 1123F ACP DISCUSS/DSCN MKR DOCD: CPT | Performed by: INTERNAL MEDICINE

## 2024-12-03 PROCEDURE — G8400 PT W/DXA NO RESULTS DOC: HCPCS | Performed by: INTERNAL MEDICINE

## 2024-12-03 PROCEDURE — 1090F PRES/ABSN URINE INCON ASSESS: CPT | Performed by: INTERNAL MEDICINE

## 2024-12-03 PROCEDURE — 2022F DILAT RTA XM EVC RTNOPTHY: CPT | Performed by: INTERNAL MEDICINE

## 2024-12-03 PROCEDURE — G2211 COMPLEX E/M VISIT ADD ON: HCPCS | Performed by: INTERNAL MEDICINE

## 2024-12-03 PROCEDURE — G8484 FLU IMMUNIZE NO ADMIN: HCPCS | Performed by: INTERNAL MEDICINE

## 2024-12-03 PROCEDURE — 1036F TOBACCO NON-USER: CPT | Performed by: INTERNAL MEDICINE

## 2024-12-03 PROCEDURE — 83036 HEMOGLOBIN GLYCOSYLATED A1C: CPT | Performed by: INTERNAL MEDICINE

## 2024-12-03 RX ORDER — DEXAMETHASONE 1 MG
1 TABLET ORAL ONCE
Qty: 1 TABLET | Refills: 0 | Status: SHIPPED | OUTPATIENT
Start: 2024-12-03 | End: 2024-12-03

## 2024-12-03 NOTE — PROGRESS NOTES
[facility] Department of Endocrinology Diabetes and Metabolism   [address] [place] [address]   Phone: [phone]  Fax: [phone]    Date of service: 12/3/2024  Primary Care Physician: Carrillo Correa DO  Consultant physician: Alfonso Nascimento MD     Reason for the consultation:  Uncontrolled DM type 2     History of Present Illness:  Services were provided through a video synchronous discussion     Dara Betancur is a 69 y.o. old female with PMH of DM type 2, lymphedema with chronic wounds, COPD,on Ox, HTN, Afib seen today for follow up visit     The patient was diagnosed with type 2 DM at the age 42. She is currently on Jardiance 10 mg daily, Lantus 42 U BID, Humalog 35U with meals + ss 3:50>150.   Patient still not consistent carbohydrate meals, self-blood glucose monitoring has been variable. In addition, patient reported h/o neuropathy.   Lab Results   Component Value Date/Time    LABA1C 8.0 12/03/2024 09:45 AM       Regarding the adrenal incidentaloma  This was diagnosed long time ago.  All the previous of blood work were negative for any abnormal hormones.  Over the years had adrenal lesion has been increasing in size.  Last CT scan done at [facility] earlier this year in February 2024 showed a lesion actually approaching 7 cm with features suggesting hemangiomas but still looking atypical lesion.  I ordered a referral to urology for possible surgery last time but she did not see anybody yet.  The patient is due for scan now.  And also a blood work    CT adrenal  5/30/2020 --> high pre-contrast Hounsfield units (34) with late phase washout 49 Hounsfield units makings lesion undetermined. This lesion has increased size when comparison is made with previous study of April 2019. Increased size of the right adrenal gland lesion up to 3.4 x 3 cm,  Abdominal CT scan 8/2021 --> 4.4 cm Rt adrenal lesion with 33 Hounsfield units  Previous endocrine work up 5/2020, 8/2021  showed

## 2024-12-10 ENCOUNTER — CLINICAL DOCUMENTATION (OUTPATIENT)
Dept: FAMILY MEDICINE CLINIC | Age: 69
End: 2024-12-10

## 2024-12-10 NOTE — PROGRESS NOTES
daily      traZODone (DESYREL) 50 MG tablet Take 1 tablet by mouth nightly      senna (SENOKOT) 8.6 MG tablet Take 2 tablets by mouth daily      DULoxetine (CYMBALTA) 60 MG extended release capsule Take 1 capsule by mouth nightly *SEE OTHER ORDER*      hydrOXYzine (ATARAX) 50 MG tablet Take 1 tablet by mouth 3 times daily      ipratropium (ATROVENT) 0.02 % nebulizer solution Take 2.5 mLs by nebulization 3 times daily *SEE OTHER ORDER*      ezetimibe (ZETIA) 10 MG tablet Take 1 tablet by mouth nightly 30 tablet 3    acetaminophen (TYLENOL) 500 MG tablet Take 2 tablets by mouth every 6 hours as needed for Pain Indications: -NTE: 3G/24HRS-      gabapentin (NEURONTIN) 100 MG capsule Take 2 capsules by mouth 3 times daily.       No facility-administered encounter medications on file as of 12/10/2024.       Reviewed recent labs related to Dara's current problems      Discussed importance of regular Health Maintenance follow up  Health Maintenance   Topic    Depression Monitoring     DTaP/Tdap/Td vaccine (1 - Tdap)    DEXA (modify frequency per FRAX score)     Respiratory Syncytial Virus (RSV) Pregnant or age 60 yrs+ (1 - Risk 60-74 years 1-dose series)    Shingles vaccine (2 of 3)    Colorectal Cancer Screen     Pneumococcal 65+ years Vaccine (2 of 2 - PCV)    Diabetic retinal exam     Breast cancer screen     Diabetic foot exam     Diabetic Alb to Cr ratio (uACR) test     Annual Wellness Visit (Medicare Advantage)     Lipids     Flu vaccine (1)    COVID-19 Vaccine (3 - 2023-24 season)    GFR test (Diabetes, CKD 3-4, OR last GFR 15-59)     A1C test (Diabetic or Prediabetic)     Hepatitis C screen     Hepatitis A vaccine     Hepatitis B vaccine     Hib vaccine     Polio vaccine     Meningococcal (ACWY) vaccine              Kailsah Galicia MD    12/10/2024 11:13 AM

## 2024-12-11 ENCOUNTER — OUTSIDE SERVICES (OUTPATIENT)
Dept: FAMILY MEDICINE CLINIC | Age: 69
End: 2024-12-11

## 2024-12-11 DIAGNOSIS — I50.43 CHF (CONGESTIVE HEART FAILURE), NYHA CLASS I, ACUTE ON CHRONIC, COMBINED (HCC): ICD-10-CM

## 2024-12-11 DIAGNOSIS — I10 ESSENTIAL HYPERTENSION: Primary | ICD-10-CM

## 2024-12-11 DIAGNOSIS — E27.8 ADRENAL MASS (HCC): ICD-10-CM

## 2024-12-11 DIAGNOSIS — I27.20 MODERATE PULMONARY HYPERTENSION (HCC): ICD-10-CM

## 2024-12-11 DIAGNOSIS — I82.5Z1 CHRONIC DEEP VEIN THROMBOSIS (DVT) OF DISTAL VEIN OF RIGHT LOWER EXTREMITY (HCC): ICD-10-CM

## 2024-12-11 DIAGNOSIS — J44.89 COPD WITH ASTHMA (HCC): ICD-10-CM

## 2024-12-11 NOTE — PROGRESS NOTES
Substance Use Topics    Alcohol use: Not Currently     Comment: 1 can coke daily stopped using. no caffeine now       OBJECTIVE  LMP  (LMP Unknown)     Problem List:  Dara has Anemia on their pertinent problem list.    PHYS EX:  Physical Exam  Constitutional:       General: They are not in acute distress.  HENT:      Head: Normocephalic and atraumatic.   Eyes:      Extraocular Movements: Extraocular movements intact.   Cardiovascular:      Rate and Rhythm: Normal rate and regular rhythm.      Heart sounds: No murmur heard.     No friction rub. No gallop.   Pulmonary:      Breath sounds: Normal breath sounds. No wheezing, rhonchi or rales.   Abdominal:      General: There is no distension.      Tenderness: There is mild TTP in RUQ  Musculoskeletal:      Right lower leg: lymphedema.      Left lower leg:lymphedema.   Neurological:      Mental Status: She is alert.    Skin:             ASSESSMENT/PLAN  Diagnoses and all orders for this visit:      Recent GI hemorrhage  No active bleeding at this time  Recent admission to the hospital 2/19 to 2/22  Eliquis was stopped at this time    Eczema  Aquaphor prn to bilateral forearms for 14 days    Right wrist pain 2/2 FOOSH  Wrist wrapped.     Headache  Tylenol prn    Poorly controlled diabetes mellitus    Continue Jardiance 10 mg po qd, Lantus 42 units twice daily, Humalog 6 units TID with meals with sliding scale  Glucose checks 4 times daily  Follows with endocrine-Dr. Nascimento    COPD  Ipratropium bromide inhaler TID    Essential hypertension  Amiodarone    Acute diastolic congestive heart failure  Bumex 2 mg QD  Empagliflozin    Ulcers of both lower legs  Wound care following    Anxiety  Duloxetine 60 mg QD  Hydroxyzine 50 mg TID    Paroxysmal atrial fibrillation (HCC), Chronic deep vein thrombosis (DVT) of distal vein of right lower extremity (HCC)  Amiodarone 100 mg QD  OFF Eliquis 5 mg BID  Toprol  mg    Diabetic polyneuropathy associated with type 2 diabetes

## 2025-01-10 ENCOUNTER — CLINICAL DOCUMENTATION (OUTPATIENT)
Dept: FAMILY MEDICINE CLINIC | Age: 70
End: 2025-01-10

## 2025-01-10 NOTE — PROGRESS NOTES
SUBJECTIVE  Dara Betancur is a 69 y.o. female.    Seen: 01/10/2025    HPI/Chief C/O:    Allergies   Allergen Reactions    Statins Myalgia and Rash       Dara was seen and examined lying in bed inside her room this day. She reports feeling fine at this time. She was on her baseline oxygen via nasal cannula. She denies having any chest pain, sob, nausea, vomiting, abdominal pain, constipation or diarrhea.      She is medically stable  There are no new issues as per nursing    ROS:  As above.     Past Medical/Surgical Hx;        Diagnosis Date    (HFpEF) heart failure with preserved ejection fraction (HCC)     1/16/2018- echo- LVEF 55-65%    Anal fissure     Anemia 10/6/2017    Anxiety and depression     Arthritis     Asthma     Atrial fibrillation (HCC)     Eliquis    Blood transfusion     long time no reaction    CHF (congestive heart failure) ([facility])     Diastolic    COPD (chronic obstructive pulmonary disease) ([facility])     Disc disorder     DM2 (diabetes mellitus, type 2) ([facility])     on insulin    Fall due to stumbling 10/6/2017    GERD (gastroesophageal reflux disease)     History of blood transfusion     Hx of blood clots     Hyperlipidemia     Hypertension     Inappropriate sinus tachycardia     LVH (left ventricular hypertrophy)     moderate    Lymphadenitis, chronic 4/13/2018    Occipital neuralgia 11/2/2011    Respiratory acidosis 10/7/2017    Sinus tachycardia 2/16/2015     Past Surgical History:   Procedure Laterality Date    ANOSCOPY  10/25/2006    injection of anal sphincter with Botox, Franklyn Ortiz/Timmy, [facility]    ANOSCOPY  4/9/2007    injection of anal sphincter with Botox, Dr. Ortiz, [facility]    ANOSCOPY  6/13/2007    injection of anal sphincter with Botox, Franklyn Ortiz/Timmy, [facility]    ANUS SURGERY  4/4/2006    Lateral sphincterotomy for chronic anal fissure, Dr. Mello, [facility]    ANUS SURGERY  4/18/2012    anal exam and injection of Botox 100 units into anal sphincter, Laila Farley, [facility]

## 2025-01-13 ENCOUNTER — OUTSIDE SERVICES (OUTPATIENT)
Dept: FAMILY MEDICINE CLINIC | Age: 70
End: 2025-01-13
Payer: COMMERCIAL

## 2025-01-13 DIAGNOSIS — E11.42 DIABETIC POLYNEUROPATHY ASSOCIATED WITH TYPE 2 DIABETES MELLITUS (HCC): Chronic | ICD-10-CM

## 2025-01-13 DIAGNOSIS — I48.0 PAF (PAROXYSMAL ATRIAL FIBRILLATION) (HCC): Chronic | ICD-10-CM

## 2025-01-13 DIAGNOSIS — F32.A DEPRESSION, UNSPECIFIED DEPRESSION TYPE: Primary | ICD-10-CM

## 2025-01-13 DIAGNOSIS — E27.8 ADRENAL MASS (HCC): ICD-10-CM

## 2025-01-13 DIAGNOSIS — I27.20 MODERATE PULMONARY HYPERTENSION (HCC): ICD-10-CM

## 2025-01-13 DIAGNOSIS — I82.5Z1 CHRONIC DEEP VEIN THROMBOSIS (DVT) OF DISTAL VEIN OF RIGHT LOWER EXTREMITY (HCC): ICD-10-CM

## 2025-01-13 DIAGNOSIS — I50.43 CHF (CONGESTIVE HEART FAILURE), NYHA CLASS I, ACUTE ON CHRONIC, COMBINED (HCC): ICD-10-CM

## 2025-01-13 DIAGNOSIS — I10 ESSENTIAL HYPERTENSION: ICD-10-CM

## 2025-01-13 DIAGNOSIS — J44.89 COPD WITH ASTHMA (HCC): ICD-10-CM

## 2025-01-13 PROCEDURE — 99309 SBSQ NF CARE MODERATE MDM 30: CPT | Performed by: FAMILY MEDICINE

## 2025-01-13 NOTE — PROGRESS NOTES
SUBJECTIVE  Dara Betancur is a 69 y.o. female.    Seen: 01/10/2025    HPI/Chief C/O:    Allergies   Allergen Reactions    Statins Myalgia and Rash       Dara was seen and examined lying in bed inside her room this day. She reports feeling fine at this time. She was on her baseline oxygen via nasal cannula. She denies having any chest pain, sob, nausea, vomiting, abdominal pain, constipation or diarrhea.      She is medically stable  There are no new issues as per nursing    ROS:  As above.     Past Medical/Surgical Hx;        Diagnosis Date    (HFpEF) heart failure with preserved ejection fraction (HCC)     1/16/2018- echo- LVEF 55-65%    Anal fissure     Anemia 10/6/2017    Anxiety and depression     Arthritis     Asthma     Atrial fibrillation (HCC)     Eliquis    Blood transfusion     long time no reaction    CHF (congestive heart failure) ([facility])     Diastolic    COPD (chronic obstructive pulmonary disease) ([facility])     Disc disorder     DM2 (diabetes mellitus, type 2) ([facility])     on insulin    Fall due to stumbling 10/6/2017    GERD (gastroesophageal reflux disease)     History of blood transfusion     Hx of blood clots     Hyperlipidemia     Hypertension     Inappropriate sinus tachycardia     LVH (left ventricular hypertrophy)     moderate    Lymphadenitis, chronic 4/13/2018    Occipital neuralgia 11/2/2011    Respiratory acidosis 10/7/2017    Sinus tachycardia 2/16/2015     Past Surgical History:   Procedure Laterality Date    ANOSCOPY  10/25/2006    injection of anal sphincter with Botox, Franklyn Ortiz/Timmy, [facility]    ANOSCOPY  4/9/2007    injection of anal sphincter with Botox, Dr. Ortiz, [facility]    ANOSCOPY  6/13/2007    injection of anal sphincter with Botox, Franklyn Ortiz/Timmy, [facility]    ANUS SURGERY  4/4/2006    Lateral sphincterotomy for chronic anal fissure, Dr. Mello, [facility]    ANUS SURGERY  4/18/2012    anal exam and injection of Botox 100 units into anal sphincter, Laila Farley, [facility]

## 2025-01-20 ENCOUNTER — CLINICAL DOCUMENTATION (OUTPATIENT)
Dept: FAMILY MEDICINE CLINIC | Age: 70
End: 2025-01-20

## 2025-01-20 NOTE — PROGRESS NOTES
SUBJECTIVE  Dara Betancur is a 69 y.o. female.    Seen: 01/20/2025    HPI/Chief C/O:    Allergies   Allergen Reactions    Statins Myalgia and Rash       Dara was seen and examined at bedside inside her room this day. She reports feeling fine at this time. She was on her baseline oxygen via nasal cannula. She denies having any chest pain, sob, nausea, vomiting, abdominal pain, constipation or diarrhea. Discussed chronic lower extremity edema.       She is medically stable  There are no new issues as per nursing, though nursing does state patient can be noncompliant with meds and diet at times     ROS:  As above.     Past Medical/Surgical Hx;        Diagnosis Date    (HFpEF) heart failure with preserved ejection fraction (HCC)     1/16/2018- echo- LVEF 55-65%    Anal fissure     Anemia 10/6/2017    Anxiety and depression     Arthritis     Asthma     Atrial fibrillation ([facility])     Eliquis    Blood transfusion     long time no reaction    CHF (congestive heart failure) ([facility])     Diastolic    COPD (chronic obstructive pulmonary disease) ([facility])     Disc disorder     DM2 (diabetes mellitus, type 2) ([facility])     on insulin    Fall due to stumbling 10/6/2017    GERD (gastroesophageal reflux disease)     History of blood transfusion     Hx of blood clots     Hyperlipidemia     Hypertension     Inappropriate sinus tachycardia     LVH (left ventricular hypertrophy)     moderate    Lymphadenitis, chronic 4/13/2018    Occipital neuralgia 11/2/2011    Respiratory acidosis 10/7/2017    Sinus tachycardia 2/16/2015     Past Surgical History:   Procedure Laterality Date    ANOSCOPY  10/25/2006    injection of anal sphincter with Botox, Franklyn Ortiz/Timmy, [facility]    ANOSCOPY  4/9/2007    injection of anal sphincter with Botox, Dr. Ortiz, [facility]    ANOSCOPY  6/13/2007    injection of anal sphincter with Botox, Franklyn Ortiz/Timmy, [facility]    ANUS SURGERY  4/4/2006    Lateral sphincterotomy for chronic anal fissure, Dr. Mello, [facility]

## 2025-02-17 ENCOUNTER — OUTSIDE SERVICES (OUTPATIENT)
Dept: FAMILY MEDICINE CLINIC | Age: 70
End: 2025-02-17
Payer: COMMERCIAL

## 2025-02-17 DIAGNOSIS — E27.8 ADRENAL MASS: ICD-10-CM

## 2025-02-17 DIAGNOSIS — I27.20 MODERATE PULMONARY HYPERTENSION (HCC): ICD-10-CM

## 2025-02-17 DIAGNOSIS — I48.0 PAF (PAROXYSMAL ATRIAL FIBRILLATION) (HCC): Chronic | ICD-10-CM

## 2025-02-17 DIAGNOSIS — Z87.19 H/O: GI BLEED: ICD-10-CM

## 2025-02-17 DIAGNOSIS — I82.5Z1 CHRONIC DEEP VEIN THROMBOSIS (DVT) OF DISTAL VEIN OF RIGHT LOWER EXTREMITY (HCC): ICD-10-CM

## 2025-02-17 DIAGNOSIS — J44.89 COPD WITH ASTHMA (HCC): ICD-10-CM

## 2025-02-17 DIAGNOSIS — I50.43 CHF (CONGESTIVE HEART FAILURE), NYHA CLASS I, ACUTE ON CHRONIC, COMBINED (HCC): ICD-10-CM

## 2025-02-17 DIAGNOSIS — E11.42 DIABETIC POLYNEUROPATHY ASSOCIATED WITH TYPE 2 DIABETES MELLITUS (HCC): Chronic | ICD-10-CM

## 2025-02-17 DIAGNOSIS — I10 ESSENTIAL HYPERTENSION: Primary | ICD-10-CM

## 2025-02-17 PROBLEM — K92.2 GI BLEED: Status: RESOLVED | Noted: 2018-11-06 | Resolved: 2025-02-17

## 2025-02-17 PROCEDURE — 99309 SBSQ NF CARE MODERATE MDM 30: CPT | Performed by: FAMILY MEDICINE

## 2025-02-17 NOTE — PROGRESS NOTES
SUBJECTIVE  Dara Betancur is a 69 y.o. female.    Seen: 2/13/2025    HPI/Chief C/O:    Allergies   Allergen Reactions    Statins Myalgia and Rash       Dara was seen and examined at bedside inside her room this day. She reports feeling fine at this time. She was on her baseline oxygen via nasal cannula. She denies having any chest pain, sob, nausea, vomiting, abdominal pain, constipation or diarrhea. Discussed chronic lower extremity edema.       She is medically stable  There are no new issues as per nursing, though nursing does state patient can be noncompliant with meds and diet at times     ROS:  As above.     Past Medical/Surgical Hx;        Diagnosis Date    (HFpEF) heart failure with preserved ejection fraction (HCC)     1/16/2018- echo- LVEF 55-65%    Anal fissure     Anemia 10/6/2017    Anxiety and depression     Arthritis     Asthma     Atrial fibrillation ([facility])     Eliquis    Blood transfusion     long time no reaction    CHF (congestive heart failure) ([facility])     Diastolic    COPD (chronic obstructive pulmonary disease) ([facility])     Disc disorder     DM2 (diabetes mellitus, type 2) ([facility])     on insulin    Fall due to stumbling 10/6/2017    GERD (gastroesophageal reflux disease)     History of blood transfusion     Hx of blood clots     Hyperlipidemia     Hypertension     Inappropriate sinus tachycardia     LVH (left ventricular hypertrophy)     moderate    Lymphadenitis, chronic 4/13/2018    Occipital neuralgia 11/2/2011    Respiratory acidosis 10/7/2017    Sinus tachycardia 2/16/2015     Past Surgical History:   Procedure Laterality Date    ANOSCOPY  10/25/2006    injection of anal sphincter with Botox, Franklyn Ortiz/Timmy, [facility]    ANOSCOPY  4/9/2007    injection of anal sphincter with Botox, Dr. Ortiz, [facility]    ANOSCOPY  6/13/2007    injection of anal sphincter with Botox, Franklyn Ortiz/Timmy, [facility]    ANUS SURGERY  4/4/2006    Lateral sphincterotomy for chronic anal fissure, Dr. Mello, [facility]    ANUS

## 2025-02-27 ENCOUNTER — CLINICAL DOCUMENTATION (OUTPATIENT)
Dept: FAMILY MEDICINE CLINIC | Age: 70
End: 2025-02-27

## 2025-02-27 NOTE — PROGRESS NOTES
5 Mg daily    Ulcers of both lower legs  Wound care following    Anxiety/Depression  Duloxetine 60 mg QD  Trazodone 75 Mg nightly    Paroxysmal atrial fibrillation (HCC), Chronic deep vein thrombosis (DVT) of distal vein of right lower extremity (HCC)  Amiodarone 100 mg QD   Eliquis 5 mg BID  Toprol  mg daily    Diabetic polyneuropathy associated with type 2 diabetes mellitus (HCC)  Continue gabapentin 200 mg 3 times daily  Voltaren as needed    Insomnia  Trazadone 75 mg QD at bedtime       Iron supplementation, Kcl 20 meq QD      Nayla Kramer MD    2/27/2025 3:21 PM

## 2025-03-11 ENCOUNTER — OUTSIDE SERVICES (OUTPATIENT)
Dept: FAMILY MEDICINE CLINIC | Age: 70
End: 2025-03-11

## 2025-03-11 DIAGNOSIS — J96.12 CHRONIC RESPIRATORY FAILURE WITH HYPERCAPNIA (HCC): ICD-10-CM

## 2025-03-11 DIAGNOSIS — I82.5Z1 CHRONIC DEEP VEIN THROMBOSIS (DVT) OF DISTAL VEIN OF RIGHT LOWER EXTREMITY (HCC): ICD-10-CM

## 2025-03-11 DIAGNOSIS — I10 ESSENTIAL HYPERTENSION: Primary | ICD-10-CM

## 2025-03-11 DIAGNOSIS — I50.43 CHF (CONGESTIVE HEART FAILURE), NYHA CLASS I, ACUTE ON CHRONIC, COMBINED (HCC): ICD-10-CM

## 2025-03-11 DIAGNOSIS — I48.0 PAF (PAROXYSMAL ATRIAL FIBRILLATION) (HCC): Chronic | ICD-10-CM

## 2025-03-11 DIAGNOSIS — I27.20 MODERATE PULMONARY HYPERTENSION (HCC): ICD-10-CM

## 2025-03-11 DIAGNOSIS — J44.89 COPD WITH ASTHMA (HCC): ICD-10-CM

## 2025-03-11 DIAGNOSIS — E11.42 DIABETIC POLYNEUROPATHY ASSOCIATED WITH TYPE 2 DIABETES MELLITUS (HCC): Chronic | ICD-10-CM

## 2025-03-11 DIAGNOSIS — J96.11 CHRONIC RESPIRATORY FAILURE WITH HYPOXIA (HCC): ICD-10-CM

## 2025-03-11 NOTE — PROGRESS NOTES
SUBJECTIVE  Dara Betancur is a 69 y.o. female.    Seen: 3/6/2025    HPI/Chief C/O:    She reports feeling fine at this time. She was on her baseline oxygen via nasal cannula. She denies having any chest pain, sob, nausea, vomiting, abdominal pain, constipation or diarrhea.    She is medically stable  There are no new issues as per nursing    Physical Exam  Constitutional:       General: They are not in acute distress.  HENT:      Head: Normocephalic and atraumatic.   Eyes:      Extraocular Movements: Extraocular movements intact.   Cardiovascular:      Rate and Rhythm: Normal rate and regular rhythm.      Heart sounds: No murmur heard.     No friction rub. No gallop.   Pulmonary:      Breath sounds: Normal breath sounds. No wheezing, rhonchi or rales.   Abdominal:      General: There is no distension.      Tenderness: There is mild TTP in RUQ  Musculoskeletal:      Right lower leg: lymphedema. Dark skin/venous stasis dermatitis      Left lower leg:lymphedema. Dark skin/venous stasis dermatitis   Neurological:      Mental Status: She is alert.        ASSESSMENT/PLAN  Diagnoses and all orders for this visit:      Hx GI hemorrhage  No active bleeding at this time  Recent admission to the hospital 2/19 to 2/22 for GI bleed and Eliquis was stopped intermittently  Eliquis restarted at 5 Mg twice daily    Eczema  Aquaphor prn to bilateral forearms for 14 days  Can also apply to legs     Right wrist pain 2/2 FOOSH  Wrist wrapped.     Headache  Tylenol prn    Poorly controlled diabetes mellitus  Continue Jardiance 10 mg po qd, Lantus 40 units and 20 units day and night each, Humalog 12 units TID with meals with sliding scale, Trulicity 0.75 Mg weekly every Saturday  Glucose checks 4 times daily  Follows with endocrine-Dr. Nascimento  Gabapentin 200 mg 3 times daily    COPD  Ipratropium bromide inhaler TID    Essential hypertension  Amiodarone 200 Mg daily    Acute diastolic congestive heart failure  Bumex 2 mg QD  Dapagliflozin

## 2025-04-15 ENCOUNTER — OUTSIDE SERVICES (OUTPATIENT)
Dept: FAMILY MEDICINE CLINIC | Age: 70
End: 2025-04-15

## 2025-04-15 ENCOUNTER — OUTSIDE SERVICES (OUTPATIENT)
Dept: FAMILY MEDICINE CLINIC | Age: 70
End: 2025-04-15
Payer: COMMERCIAL

## 2025-04-15 DIAGNOSIS — I10 ESSENTIAL HYPERTENSION: Primary | ICD-10-CM

## 2025-04-15 DIAGNOSIS — E11.42 DIABETIC POLYNEUROPATHY ASSOCIATED WITH TYPE 2 DIABETES MELLITUS: Chronic | ICD-10-CM

## 2025-04-15 DIAGNOSIS — E66.01 MORBID OBESITY WITH BMI OF 45.0-49.9, ADULT: ICD-10-CM

## 2025-04-15 DIAGNOSIS — I48.0 PAF (PAROXYSMAL ATRIAL FIBRILLATION) (HCC): Chronic | ICD-10-CM

## 2025-04-15 DIAGNOSIS — I27.20 MODERATE PULMONARY HYPERTENSION (HCC): ICD-10-CM

## 2025-04-15 DIAGNOSIS — I82.5Z1 CHRONIC DEEP VEIN THROMBOSIS (DVT) OF DISTAL VEIN OF RIGHT LOWER EXTREMITY: ICD-10-CM

## 2025-04-15 DIAGNOSIS — J44.89 COPD WITH ASTHMA (HCC): ICD-10-CM

## 2025-04-15 DIAGNOSIS — I50.43 CHF (CONGESTIVE HEART FAILURE), NYHA CLASS I, ACUTE ON CHRONIC, COMBINED (HCC): ICD-10-CM

## 2025-04-15 PROCEDURE — 99309 SBSQ NF CARE MODERATE MDM 30: CPT | Performed by: FAMILY MEDICINE

## 2025-04-15 NOTE — PROGRESS NOTES
checks 4 times daily  Follows with endocrine-Dr. Nascimento  Gabapentin 200 mg 3 times daily    COPD  Ipratropium bromide inhaler TID    Essential hypertension  Amiodarone 200 Mg daily    Acute diastolic congestive heart failure  Bumex 2 mg QD  Dapagliflozin 5 Mg daily    Ulcers of both lower legs  Wound care following    Anxiety/Depression  Duloxetine 60 mg QD  Trazodone 75 Mg nightly    Paroxysmal atrial fibrillation (HCC), Chronic deep vein thrombosis (DVT) of distal vein of right lower extremity (HCC)  Amiodarone 100 mg QD   Eliquis 5 mg BID  Toprol  mg daily    Diabetic polyneuropathy associated with type 2 diabetes mellitus (HCC)  Continue gabapentin 200 mg 3 times daily  Voltaren as needed    Insomnia  Trazadone 75 mg QD at bedtime       Iron supplementation, Kcl 20 meq Q      Mustapha Fuentes DO    4/15/2025 12:48 PM

## 2025-05-12 ENCOUNTER — OUTSIDE SERVICES (OUTPATIENT)
Dept: FAMILY MEDICINE CLINIC | Age: 70
End: 2025-05-12
Payer: COMMERCIAL

## 2025-05-12 DIAGNOSIS — I10 ESSENTIAL HYPERTENSION: ICD-10-CM

## 2025-05-12 DIAGNOSIS — I27.20 MODERATE PULMONARY HYPERTENSION (HCC): ICD-10-CM

## 2025-05-12 DIAGNOSIS — E11.42 DIABETIC POLYNEUROPATHY ASSOCIATED WITH TYPE 2 DIABETES MELLITUS (HCC): Chronic | ICD-10-CM

## 2025-05-12 DIAGNOSIS — E66.01 MORBID OBESITY WITH BMI OF 45.0-49.9, ADULT (HCC): ICD-10-CM

## 2025-05-12 DIAGNOSIS — I48.0 PAF (PAROXYSMAL ATRIAL FIBRILLATION) (HCC): Chronic | ICD-10-CM

## 2025-05-12 DIAGNOSIS — I82.5Z1 CHRONIC DEEP VEIN THROMBOSIS (DVT) OF DISTAL VEIN OF RIGHT LOWER EXTREMITY (HCC): Primary | ICD-10-CM

## 2025-05-12 DIAGNOSIS — J96.12 CHRONIC RESPIRATORY FAILURE WITH HYPERCAPNIA (HCC): ICD-10-CM

## 2025-05-12 PROCEDURE — 99309 SBSQ NF CARE MODERATE MDM 30: CPT | Performed by: FAMILY MEDICINE

## 2025-05-12 NOTE — PROGRESS NOTES
SUBJECTIVE  Dara Betancur is a 69 y.o. female.    Seen: 5/8/2025    HPI/Chief C/O:    She reports feeling fine at this time. She was on her baseline oxygen via nasal cannula. She denies having any chest pain, sob, nausea, vomiting, abdominal pain, constipation or diarrhea.    She is medically stable  There are no new issues as per nursing    HPI      Physical Exam  Constitutional:       General: They are not in acute distress.  HENT:      Head: Normocephalic and atraumatic.   Eyes:      Extraocular Movements: Extraocular movements intact.   Cardiovascular:      Rate and Rhythm: Normal rate and regular rhythm.      Heart sounds: No murmur heard.     No friction rub. No gallop.   Pulmonary:      Breath sounds: Normal breath sounds. No wheezing, rhonchi or rales.   Abdominal:      General: There is no distension.      Tenderness: There is mild TTP in RUQ  Musculoskeletal:      Right lower leg: lymphedema. Dark skin/venous stasis dermatitis      Left lower leg:lymphedema. Dark skin/venous stasis dermatitis   Neurological:      Mental Status: She is alert.        ASSESSMENT/PLAN  Diagnoses and all orders for this visit:      Hx GI hemorrhage  No active bleeding at this time  Recent admission to the hospital 2/19 to 2/22 for GI bleed and Eliquis was stopped intermittently  Eliquis restarted at 5 Mg twice daily    Eczema  Aquaphor prn to bilateral forearms for 14 days  Can also apply to legs     Right wrist pain 2/2 FOOSH  Wrist wrapped.     Headache  Tylenol prn    Poorly controlled diabetes mellitus  Continue Jardiance 10 mg po qd, Lantus 40 units and 20 units day and night each, Humalog 12 units TID with meals with sliding scale, Trulicity 0.75 Mg weekly every Saturday  Glucose checks 4 times daily  Follows with endocrine-Dr. Nascimento  Gabapentin 200 mg 3 times daily    COPD  Ipratropium bromide inhaler TID    Essential hypertension  Amiodarone 200 Mg daily    Acute diastolic congestive heart failure  Bumex 2 mg

## 2025-06-16 ENCOUNTER — OUTSIDE SERVICES (OUTPATIENT)
Dept: FAMILY MEDICINE CLINIC | Age: 70
End: 2025-06-16
Payer: COMMERCIAL

## 2025-06-16 DIAGNOSIS — I50.43 CHF (CONGESTIVE HEART FAILURE), NYHA CLASS I, ACUTE ON CHRONIC, COMBINED (HCC): ICD-10-CM

## 2025-06-16 DIAGNOSIS — E66.01 MORBID OBESITY WITH BMI OF 45.0-49.9, ADULT (HCC): ICD-10-CM

## 2025-06-16 DIAGNOSIS — J96.11 CHRONIC RESPIRATORY FAILURE WITH HYPOXIA (HCC): ICD-10-CM

## 2025-06-16 DIAGNOSIS — I82.5Z1 CHRONIC DEEP VEIN THROMBOSIS (DVT) OF DISTAL VEIN OF RIGHT LOWER EXTREMITY (HCC): ICD-10-CM

## 2025-06-16 DIAGNOSIS — J96.12 CHRONIC RESPIRATORY FAILURE WITH HYPERCAPNIA (HCC): ICD-10-CM

## 2025-06-16 DIAGNOSIS — E11.42 DIABETIC POLYNEUROPATHY ASSOCIATED WITH TYPE 2 DIABETES MELLITUS (HCC): Chronic | ICD-10-CM

## 2025-06-16 DIAGNOSIS — I27.20 MODERATE PULMONARY HYPERTENSION (HCC): ICD-10-CM

## 2025-06-16 DIAGNOSIS — J44.89 COPD WITH ASTHMA (HCC): ICD-10-CM

## 2025-06-16 DIAGNOSIS — I10 ESSENTIAL HYPERTENSION: Primary | ICD-10-CM

## 2025-06-16 DIAGNOSIS — I48.0 PAF (PAROXYSMAL ATRIAL FIBRILLATION) (HCC): Chronic | ICD-10-CM

## 2025-06-16 PROCEDURE — 99309 SBSQ NF CARE MODERATE MDM 30: CPT | Performed by: FAMILY MEDICINE

## 2025-06-16 NOTE — PROGRESS NOTES
SUBJECTIVE  Dara Betancur is a 69 y.o. female.    Seen: 6/12/2025    HPI/Chief C/O:    She reports feeling fine at this time. She was on her baseline oxygen via nasal cannula. She denies having any chest pain, sob, nausea, vomiting, abdominal pain, constipation or diarrhea.    She is medically stable  There are no new issues as per nursing      We will review all past medical history and go over past and current medications  We will review all medical records that are available to us  We will reconcile our care planning and treatment goals to past and present for this patient       HPI      Physical Exam  Constitutional:       General: They are not in acute distress.  HENT:      Head: Normocephalic and atraumatic.   Eyes:      Extraocular Movements: Extraocular movements intact.   Cardiovascular:      Rate and Rhythm: Normal rate and regular rhythm.      Heart sounds: No murmur heard.     No friction rub. No gallop.   Pulmonary:      Breath sounds: Normal breath sounds. No wheezing, rhonchi or rales.   Abdominal:      General: There is no distension.      Tenderness: There is mild TTP in RUQ  Musculoskeletal:      Right lower leg: lymphedema. Dark skin/venous stasis dermatitis      Left lower leg:lymphedema. Dark skin/venous stasis dermatitis   Neurological:      Mental Status: She is alert.        ASSESSMENT/PLAN  Diagnoses and all orders for this visit:      Hx GI hemorrhage  No active bleeding at this time  Recent admission to the hospital 2/19 to 2/22 for GI bleed and Eliquis was stopped intermittently  Eliquis restarted at 5 Mg twice daily    Eczema  Aquaphor prn to bilateral forearms for 14 days  Can also apply to legs     Right wrist pain 2/2 FOOSH  Wrist wrapped.     Headache  Tylenol prn    Poorly controlled diabetes mellitus  Continue Jardiance 10 mg po qd, Lantus 40 units and 20 units day and night each, Humalog 12 units TID with meals with sliding scale, Trulicity 0.75 Mg weekly every Saturday  Glucose

## 2025-07-16 ENCOUNTER — OUTSIDE SERVICES (OUTPATIENT)
Dept: FAMILY MEDICINE CLINIC | Age: 70
End: 2025-07-16

## 2025-07-16 DIAGNOSIS — J96.11 CHRONIC RESPIRATORY FAILURE WITH HYPOXIA (HCC): ICD-10-CM

## 2025-07-16 DIAGNOSIS — I27.20 MODERATE PULMONARY HYPERTENSION (HCC): ICD-10-CM

## 2025-07-16 DIAGNOSIS — J96.12 CHRONIC RESPIRATORY FAILURE WITH HYPERCAPNIA (HCC): ICD-10-CM

## 2025-07-16 DIAGNOSIS — E66.01 MORBID OBESITY WITH BMI OF 45.0-49.9, ADULT (HCC): ICD-10-CM

## 2025-07-16 DIAGNOSIS — I10 ESSENTIAL HYPERTENSION: Primary | ICD-10-CM

## 2025-07-16 DIAGNOSIS — I82.5Z1 CHRONIC DEEP VEIN THROMBOSIS (DVT) OF DISTAL VEIN OF RIGHT LOWER EXTREMITY (HCC): ICD-10-CM

## 2025-07-16 DIAGNOSIS — I48.0 PAF (PAROXYSMAL ATRIAL FIBRILLATION) (HCC): Chronic | ICD-10-CM

## 2025-07-16 DIAGNOSIS — E11.42 DIABETIC POLYNEUROPATHY ASSOCIATED WITH TYPE 2 DIABETES MELLITUS (HCC): Chronic | ICD-10-CM

## 2025-07-16 DIAGNOSIS — J44.89 COPD WITH ASTHMA (HCC): ICD-10-CM

## 2025-07-16 DIAGNOSIS — I50.43 CHF (CONGESTIVE HEART FAILURE), NYHA CLASS I, ACUTE ON CHRONIC, COMBINED (HCC): ICD-10-CM

## 2025-07-16 NOTE — PROGRESS NOTES
SUBJECTIVE  Dara Betancur is a 69 y.o. female.    Seen: 7/10/2025    HPI/Chief C/O:    She reports feeling fine at this time. She was on her baseline oxygen via nasal cannula. She denies having any chest pain, sob, nausea, vomiting, abdominal pain, constipation or diarrhea.    She is medically stable  There are no new issues as per nursing            HPI      Physical Exam  Constitutional:       General: They are not in acute distress.  HENT:      Head: Normocephalic and atraumatic.   Eyes:      Extraocular Movements: Extraocular movements intact.   Cardiovascular:      Rate and Rhythm: Normal rate and regular rhythm.      Heart sounds: No murmur heard.     No friction rub. No gallop.   Pulmonary:      Breath sounds: Normal breath sounds. No wheezing, rhonchi or rales.   Abdominal:      General: There is no distension.      Tenderness: There is mild TTP in RUQ  Musculoskeletal:      Right lower leg: lymphedema. Dark skin/venous stasis dermatitis      Left lower leg:lymphedema. Dark skin/venous stasis dermatitis   Neurological:      Mental Status: She is alert.    Edema to both lower extremities       ASSESSMENT/PLAN  Diagnoses and all orders for this visit:      Hx GI hemorrhage  No active bleeding at this time  Recent admission to the hospital 2/19 to 2/22 for GI bleed and Eliquis was stopped intermittently  Eliquis restarted at 5 Mg twice daily    Eczema  Aquaphor prn to bilateral forearms for 14 days  Can also apply to legs     Right wrist pain 2/2 FOOSH  Wrist wrapped.     Headache  Tylenol prn    Poorly controlled diabetes mellitus  Continue Jardiance 10 mg po qd, Lantus 40 units and 20 units day and night each, Humalog 12 units TID with meals with sliding scale, Trulicity 0.75 Mg weekly every Saturday  Glucose checks 4 times daily  Follows with endocrine-Dr. Nascimento  Gabapentin 200 mg 3 times daily    COPD  Ipratropium bromide inhaler TID    Essential hypertension  Amiodarone 200 Mg daily    Acute diastolic

## 2025-07-17 ENCOUNTER — CLINICAL DOCUMENTATION (OUTPATIENT)
Dept: FAMILY MEDICINE CLINIC | Age: 70
End: 2025-07-17

## 2025-07-30 NOTE — PROGRESS NOTES
SUBJECTIVE  Dara Betancur is a 69 y.o. female.    Seen: 7/10/2025    HPI/Chief C/O:    Patient seen and examined in the hallway, resting comfortably in her wheelchair. Was seen earlier partaking in group activities. She reports feeling fine at this time, other than chronic lower extremity pain secondary to chronic lymphedema.  She denies having any chest pain, sob, nausea, vomiting, abdominal pain, constipation or diarrhea.    She is medically stable  There are no new issues as per nursing      Physical Exam  Constitutional:       General: They are not in acute distress.  HENT:      Head: Normocephalic and atraumatic.   Eyes:      Extraocular Movements: Extraocular movements intact.   Cardiovascular:      Rate and Rhythm: Normal rate and regular rhythm.      Heart sounds: No murmur heard.     No friction rub. No gallop.   Pulmonary:      Breath sounds: Normal breath sounds. No wheezing, rhonchi or rales.   Abdominal:      General: Soft, non-tender, non distended  Musculoskeletal:      Right lower leg: lymphedema. Dark skin/venous stasis dermatitis      Left lower leg:lymphedema. Dark skin/venous stasis dermatitis   Neurological:      Mental Status: She is alert.    Edema to both lower extremities       ASSESSMENT/PLAN  Diagnoses and all orders for this visit:      Hx GI hemorrhage  No active bleeding at this time  Recent admission to the hospital 2/19 to 2/22 for GI bleed and Eliquis was stopped intermittently  Eliquis restarted at 5 Mg twice daily    Eczema  Aquaphor prn to bilateral forearms for 14 days  Can also apply to legs     Headache  Tylenol prn    Poorly controlled diabetes mellitus  Continue Jardiance 10 mg po qd, Lantus 40 units and 20 units day and night each, Humalog 12 units TID with meals with sliding scale, Trulicity 0.75 Mg weekly every Saturday  Glucose checks 4 times daily  Follows with endocrine-Dr. Nascimento  Gabapentin 200 mg 3 times daily    COPD  Ipratropium bromide inhaler TID    Essential

## 2025-08-18 ENCOUNTER — OUTSIDE SERVICES (OUTPATIENT)
Dept: FAMILY MEDICINE CLINIC | Age: 70
End: 2025-08-18

## 2025-08-18 DIAGNOSIS — I27.20 MODERATE PULMONARY HYPERTENSION (HCC): ICD-10-CM

## 2025-08-18 DIAGNOSIS — E66.01 MORBID OBESITY WITH BMI OF 45.0-49.9, ADULT (HCC): ICD-10-CM

## 2025-08-18 DIAGNOSIS — I50.43 CHF (CONGESTIVE HEART FAILURE), NYHA CLASS I, ACUTE ON CHRONIC, COMBINED (HCC): ICD-10-CM

## 2025-08-18 DIAGNOSIS — J96.11 CHRONIC RESPIRATORY FAILURE WITH HYPOXIA (HCC): ICD-10-CM

## 2025-08-18 DIAGNOSIS — I82.5Z1 CHRONIC DEEP VEIN THROMBOSIS (DVT) OF DISTAL VEIN OF RIGHT LOWER EXTREMITY (HCC): Primary | ICD-10-CM

## 2025-08-18 DIAGNOSIS — E11.42 DIABETIC POLYNEUROPATHY ASSOCIATED WITH TYPE 2 DIABETES MELLITUS (HCC): Chronic | ICD-10-CM

## 2025-08-18 DIAGNOSIS — J96.12 CHRONIC RESPIRATORY FAILURE WITH HYPERCAPNIA (HCC): ICD-10-CM

## 2025-08-18 DIAGNOSIS — I48.0 PAF (PAROXYSMAL ATRIAL FIBRILLATION) (HCC): Chronic | ICD-10-CM

## 2025-08-21 ENCOUNTER — CLINICAL DOCUMENTATION (OUTPATIENT)
Dept: FAMILY MEDICINE CLINIC | Age: 70
End: 2025-08-21

## 2025-08-21 PROBLEM — F03.90 DEMENTIA WITHOUT BEHAVIORAL DISTURBANCE (HCC): Status: ACTIVE | Noted: 2023-03-30

## 2025-08-21 PROBLEM — F01.50 VASCULAR DEMENTIA (HCC): Status: ACTIVE | Noted: 2023-03-30

## 2025-08-21 PROBLEM — G62.9 NEUROPATHY: Status: ACTIVE | Noted: 2021-04-05

## 2025-08-21 PROBLEM — L98.429: Status: ACTIVE | Noted: 2020-12-02

## 2025-08-21 PROBLEM — S91.301A OPEN WOUND OF RIGHT FOOT: Status: ACTIVE | Noted: 2020-11-03

## 2025-08-21 PROBLEM — Z79.4 TYPE 2 DIABETES MELLITUS WITH HYPERGLYCEMIA, WITH LONG-TERM CURRENT USE OF INSULIN (HCC): Status: ACTIVE | Noted: 2020-10-13

## 2025-08-21 PROBLEM — S81.809A OPEN WOUND, LOWER LEG: Status: ACTIVE | Noted: 2021-03-14

## 2025-08-21 PROBLEM — I48.91 HYPERCOAGULABILITY DUE TO ATRIAL FIBRILLATION (HCC): Status: ACTIVE | Noted: 2023-03-30

## 2025-08-21 PROBLEM — S91.302A WOUND OF LEFT FOOT: Status: ACTIVE | Noted: 2020-10-13

## 2025-08-21 PROBLEM — N18.30 HYPERTENSIVE HEART AND CHRONIC KIDNEY DISEASE STAGE 3 (HCC): Status: ACTIVE | Noted: 2020-11-03

## 2025-08-21 PROBLEM — I13.10 HYPERTENSIVE HEART AND CHRONIC KIDNEY DISEASE STAGE 3 (HCC): Status: ACTIVE | Noted: 2020-11-03

## 2025-08-21 PROBLEM — N18.30 CHRONIC KIDNEY DISEASE, STAGE 3 UNSPECIFIED (HCC): Status: ACTIVE | Noted: 2020-11-03

## 2025-08-21 PROBLEM — F11.20 OPIOID DEPENDENCE (HCC): Status: ACTIVE | Noted: 2023-03-30

## 2025-08-21 PROBLEM — D68.69 HYPERCOAGULABILITY DUE TO ATRIAL FIBRILLATION (HCC): Status: ACTIVE | Noted: 2023-03-30

## 2025-08-21 PROBLEM — E11.65 TYPE 2 DIABETES MELLITUS WITH HYPERGLYCEMIA, WITH LONG-TERM CURRENT USE OF INSULIN (HCC): Status: ACTIVE | Noted: 2020-10-13

## 2025-08-21 PROBLEM — I51.7 CARDIOMEGALY: Status: ACTIVE | Noted: 2020-10-16

## 2025-08-21 RX ORDER — DAPAGLIFLOZIN 5 MG/1
5 TABLET, FILM COATED ORAL EVERY MORNING
COMMUNITY
Start: 2025-08-09

## 2025-08-21 RX ORDER — APIXABAN 5 MG/1
5 TABLET, FILM COATED ORAL 2 TIMES DAILY
COMMUNITY
Start: 2025-08-10

## 2025-08-21 RX ORDER — DULAGLUTIDE 0.75 MG/.5ML
0.75 INJECTION, SOLUTION SUBCUTANEOUS WEEKLY
COMMUNITY
Start: 2025-08-16

## (undated) DEVICE — GAUZE,SPONGE,4"X4",8PLY,STRL,LF,10/TRAY: Brand: MEDLINE

## (undated) DEVICE — DRESSING ANTIMIC ALG OPTICELL GELLING FBR 6X6IN

## (undated) DEVICE — BANDAGE,GAUZE,CONFORMING,4"X75",STRL,LF: Brand: MEDLINE

## (undated) DEVICE — FORCEPS BX OVL CUP FEN DISPOSABLE CAP L 160CM RAD JAW 4

## (undated) DEVICE — BANDAGE,GAUZE,BULKEE II,4.5"X4.1YD,STRL: Brand: MEDLINE

## (undated) DEVICE — TOWEL,OR,DSP,ST,BLUE,STD,6/PK,12PK/CS: Brand: MEDLINE

## (undated) DEVICE — GRADUATE TRIANG MEASURE 1000ML BLK PRNT

## (undated) DEVICE — GOWN,SIRUS,FABRNF,XL,20/CS: Brand: MEDLINE

## (undated) DEVICE — PAD,ABDOMINAL,5"X9",ST,LF,25/BX: Brand: MEDLINE INDUSTRIES, INC.

## (undated) DEVICE — TRAP SPEC MUCUS FOR SUCT

## (undated) DEVICE — 3M™ COBAN™ NL STERILE NON-LATEX SELF-ADHERENT WRAP, 2084S, 4 IN X 5 YD (10 CM X 4,5 M), 18 ROLLS/CASE: Brand: 3M™ COBAN™

## (undated) DEVICE — DRAPE,EXTREMITY,89X128,STERILE: Brand: MEDLINE

## (undated) DEVICE — SPONGE LAP W18XL18IN WHT COT 4 PLY FLD STRUNG RADPQ DISP ST

## (undated) DEVICE — REAGENT TEST UREASE RAPD CLOTEST F/

## (undated) DEVICE — COVER HNDL LT DISP

## (undated) DEVICE — READY WET SKIN SCRUB TRAY-LF: Brand: MEDLINE INDUSTRIES, INC.

## (undated) DEVICE — INTENDED FOR TISSUE SEPARATION, AND OTHER PROCEDURES THAT REQUIRE A SHARP SURGICAL BLADE TO PUNCTURE OR CUT.: Brand: BARD-PARKER ® STAINLESS STEEL BLADES

## (undated) DEVICE — CHLORAPREP 26ML ORANGE

## (undated) DEVICE — SOLUTION IV IRRIG POUR BRL 0.9% SODIUM CHL 2F7124

## (undated) DEVICE — BAG SPECIMEN BIOHAZARD 6IN X 9IN

## (undated) DEVICE — PACK PROCEDURE SURG GEN CUST

## (undated) DEVICE — 4-PORT MANIFOLD: Brand: NEPTUNE 2

## (undated) DEVICE — DRESSING PETRO W3XL8IN OIL EMUL N ADH GZ KNIT IMPREG CELOS

## (undated) DEVICE — CONTROL SYRINGE LUER-LOCK TIP: Brand: MONOJECT

## (undated) DEVICE — STERILE HOOK LOCK LATEX FREE ELASTIC BANDAGE 4INX5YD: Brand: HOOK LOCK™

## (undated) DEVICE — STANDARD HYPODERMIC NEEDLE,ALUMINUM HUB: Brand: MONOJECT

## (undated) DEVICE — BLOCK BITE 60FR RUBBER ADLT DENTAL